# Patient Record
Sex: MALE | Race: BLACK OR AFRICAN AMERICAN | NOT HISPANIC OR LATINO | Employment: OTHER | URBAN - METROPOLITAN AREA
[De-identification: names, ages, dates, MRNs, and addresses within clinical notes are randomized per-mention and may not be internally consistent; named-entity substitution may affect disease eponyms.]

---

## 2017-01-10 ENCOUNTER — ALLSCRIPTS OFFICE VISIT (OUTPATIENT)
Dept: OTHER | Facility: OTHER | Age: 52
End: 2017-01-10

## 2017-01-24 ENCOUNTER — GENERIC CONVERSION - ENCOUNTER (OUTPATIENT)
Dept: OTHER | Facility: OTHER | Age: 52
End: 2017-01-24

## 2017-03-03 ENCOUNTER — GENERIC CONVERSION - ENCOUNTER (OUTPATIENT)
Dept: OTHER | Facility: OTHER | Age: 52
End: 2017-03-03

## 2017-06-15 ENCOUNTER — GENERIC CONVERSION - ENCOUNTER (OUTPATIENT)
Dept: OTHER | Facility: OTHER | Age: 52
End: 2017-06-15

## 2017-08-18 ENCOUNTER — APPOINTMENT (EMERGENCY)
Dept: CT IMAGING | Facility: HOSPITAL | Age: 52
End: 2017-08-18
Payer: MEDICARE

## 2017-08-18 ENCOUNTER — HOSPITAL ENCOUNTER (EMERGENCY)
Facility: HOSPITAL | Age: 52
Discharge: HOME | End: 2017-08-18
Attending: EMERGENCY MEDICINE | Admitting: EMERGENCY MEDICINE
Payer: MEDICARE

## 2017-08-18 VITALS
SYSTOLIC BLOOD PRESSURE: 174 MMHG | TEMPERATURE: 97.7 F | DIASTOLIC BLOOD PRESSURE: 83 MMHG | RESPIRATION RATE: 20 BRPM | HEART RATE: 78 BPM | OXYGEN SATURATION: 95 % | WEIGHT: 315 LBS

## 2017-08-18 DIAGNOSIS — R51.9 OCCIPITAL HEADACHE: Primary | ICD-10-CM

## 2017-08-18 PROCEDURE — 70450 CT HEAD/BRAIN W/O DYE: CPT

## 2017-08-18 PROCEDURE — 96361 HYDRATE IV INFUSION ADD-ON: CPT

## 2017-08-18 PROCEDURE — 99284 EMERGENCY DEPT VISIT MOD MDM: CPT

## 2017-08-18 PROCEDURE — 96375 TX/PRO/DX INJ NEW DRUG ADDON: CPT

## 2017-08-18 PROCEDURE — 96374 THER/PROPH/DIAG INJ IV PUSH: CPT

## 2017-08-18 RX ORDER — INSULIN GLARGINE 100 [IU]/ML
50 INJECTION, SOLUTION SUBCUTANEOUS
COMMUNITY
End: 2018-05-14 | Stop reason: ALTCHOICE

## 2017-08-18 RX ORDER — METOCLOPRAMIDE HYDROCHLORIDE 5 MG/ML
10 INJECTION INTRAMUSCULAR; INTRAVENOUS ONCE
Status: COMPLETED | OUTPATIENT
Start: 2017-08-18 | End: 2017-08-18

## 2017-08-18 RX ORDER — KETOROLAC TROMETHAMINE 30 MG/ML
15 INJECTION, SOLUTION INTRAMUSCULAR; INTRAVENOUS ONCE
Status: COMPLETED | OUTPATIENT
Start: 2017-08-18 | End: 2017-08-18

## 2017-08-18 RX ORDER — OMEPRAZOLE 20 MG/1
20 CAPSULE, DELAYED RELEASE ORAL DAILY
COMMUNITY
End: 2018-06-18 | Stop reason: SDUPTHER

## 2017-08-18 RX ORDER — LISINOPRIL 20 MG/1
20 TABLET ORAL DAILY
COMMUNITY
End: 2018-02-19 | Stop reason: SDUPTHER

## 2017-08-18 RX ORDER — DIPHENHYDRAMINE HYDROCHLORIDE 50 MG/ML
25 INJECTION INTRAMUSCULAR; INTRAVENOUS ONCE
Status: COMPLETED | OUTPATIENT
Start: 2017-08-18 | End: 2017-08-18

## 2017-08-18 RX ADMIN — SODIUM CHLORIDE 1000 ML: 0.9 INJECTION, SOLUTION INTRAVENOUS at 12:01

## 2017-08-18 RX ADMIN — DIPHENHYDRAMINE HYDROCHLORIDE 25 MG: 50 INJECTION, SOLUTION INTRAMUSCULAR; INTRAVENOUS at 12:00

## 2017-08-18 RX ADMIN — KETOROLAC TROMETHAMINE 15 MG: 30 INJECTION, SOLUTION INTRAMUSCULAR at 11:57

## 2017-08-18 RX ADMIN — METOCLOPRAMIDE 10 MG: 5 INJECTION, SOLUTION INTRAMUSCULAR; INTRAVENOUS at 11:58

## 2017-08-22 ENCOUNTER — ALLSCRIPTS OFFICE VISIT (OUTPATIENT)
Dept: OTHER | Facility: OTHER | Age: 52
End: 2017-08-22

## 2017-08-22 LAB — HBA1C MFR BLD HPLC: 8.1 %

## 2017-08-30 ENCOUNTER — ALLSCRIPTS OFFICE VISIT (OUTPATIENT)
Dept: OTHER | Facility: OTHER | Age: 52
End: 2017-08-30

## 2017-09-05 ENCOUNTER — ALLSCRIPTS OFFICE VISIT (OUTPATIENT)
Dept: OTHER | Facility: OTHER | Age: 52
End: 2017-09-05

## 2017-09-05 DIAGNOSIS — E11.65 TYPE 2 DIABETES MELLITUS WITH HYPERGLYCEMIA (HCC): ICD-10-CM

## 2017-09-07 ENCOUNTER — APPOINTMENT (OUTPATIENT)
Dept: LAB | Facility: CLINIC | Age: 52
End: 2017-09-07
Payer: MEDICARE

## 2017-09-07 ENCOUNTER — ALLSCRIPTS OFFICE VISIT (OUTPATIENT)
Dept: OTHER | Facility: OTHER | Age: 52
End: 2017-09-07

## 2017-09-07 DIAGNOSIS — E11.65 TYPE 2 DIABETES MELLITUS WITH HYPERGLYCEMIA (HCC): ICD-10-CM

## 2017-09-07 DIAGNOSIS — R80.9 PROTEINURIA: ICD-10-CM

## 2017-09-07 LAB
ALBUMIN SERPL BCP-MCNC: 3 G/DL (ref 3.5–5)
ALP SERPL-CCNC: 96 U/L (ref 46–116)
ALT SERPL W P-5'-P-CCNC: 11 U/L (ref 12–78)
ANION GAP SERPL CALCULATED.3IONS-SCNC: 4 MMOL/L (ref 4–13)
AST SERPL W P-5'-P-CCNC: 12 U/L (ref 5–45)
BACTERIA UR QL AUTO: ABNORMAL /HPF
BILIRUB SERPL-MCNC: 0.34 MG/DL (ref 0.2–1)
BILIRUB UR QL STRIP: NEGATIVE
BUN SERPL-MCNC: 16 MG/DL (ref 5–25)
CALCIUM SERPL-MCNC: 9.5 MG/DL (ref 8.3–10.1)
CHLORIDE SERPL-SCNC: 98 MMOL/L (ref 100–108)
CHOLEST SERPL-MCNC: 137 MG/DL (ref 50–200)
CLARITY UR: CLEAR
CO2 SERPL-SCNC: 32 MMOL/L (ref 21–32)
COLOR UR: YELLOW
CREAT SERPL-MCNC: 1.3 MG/DL (ref 0.6–1.3)
CREAT UR-MCNC: 118 MG/DL
GFR SERPL CREATININE-BSD FRML MDRD: 73 ML/MIN/1.73SQ M
GLUCOSE P FAST SERPL-MCNC: 205 MG/DL (ref 65–99)
GLUCOSE UR STRIP-MCNC: ABNORMAL MG/DL
HDLC SERPL-MCNC: 33 MG/DL (ref 40–60)
HGB UR QL STRIP.AUTO: ABNORMAL
HYALINE CASTS #/AREA URNS LPF: ABNORMAL /LPF
KETONES UR STRIP-MCNC: NEGATIVE MG/DL
LDLC SERPL CALC-MCNC: 73 MG/DL (ref 0–100)
LEUKOCYTE ESTERASE UR QL STRIP: NEGATIVE
MICROALBUMIN UR-MCNC: 1270 MG/L (ref 0–20)
MICROALBUMIN/CREAT 24H UR: 1076 MG/G CREATININE (ref 0–30)
NITRITE UR QL STRIP: NEGATIVE
NON-SQ EPI CELLS URNS QL MICRO: ABNORMAL /HPF
PH UR STRIP.AUTO: 6 [PH] (ref 4.5–8)
POTASSIUM SERPL-SCNC: 4 MMOL/L (ref 3.5–5.3)
PROT SERPL-MCNC: 7.6 G/DL (ref 6.4–8.2)
PROT UR STRIP-MCNC: ABNORMAL MG/DL
RBC #/AREA URNS AUTO: ABNORMAL /HPF
SODIUM SERPL-SCNC: 134 MMOL/L (ref 136–145)
SP GR UR STRIP.AUTO: 1.02 (ref 1–1.03)
TRIGL SERPL-MCNC: 154 MG/DL
UROBILINOGEN UR QL STRIP.AUTO: 0.2 E.U./DL
WBC #/AREA URNS AUTO: ABNORMAL /HPF

## 2017-09-07 PROCEDURE — 82043 UR ALBUMIN QUANTITATIVE: CPT

## 2017-09-07 PROCEDURE — 80053 COMPREHEN METABOLIC PANEL: CPT

## 2017-09-07 PROCEDURE — 81001 URINALYSIS AUTO W/SCOPE: CPT

## 2017-09-07 PROCEDURE — 80061 LIPID PANEL: CPT

## 2017-09-07 PROCEDURE — 82570 ASSAY OF URINE CREATININE: CPT

## 2017-09-07 PROCEDURE — 36415 COLL VENOUS BLD VENIPUNCTURE: CPT

## 2017-09-26 ENCOUNTER — GENERIC CONVERSION - ENCOUNTER (OUTPATIENT)
Dept: OTHER | Facility: OTHER | Age: 52
End: 2017-09-26

## 2017-09-28 ENCOUNTER — GENERIC CONVERSION - ENCOUNTER (OUTPATIENT)
Dept: OTHER | Facility: OTHER | Age: 52
End: 2017-09-28

## 2017-09-28 ENCOUNTER — ALLSCRIPTS OFFICE VISIT (OUTPATIENT)
Dept: OTHER | Facility: OTHER | Age: 52
End: 2017-09-28

## 2017-10-19 ENCOUNTER — GENERIC CONVERSION - ENCOUNTER (OUTPATIENT)
Dept: OTHER | Facility: OTHER | Age: 52
End: 2017-10-19

## 2017-10-31 ENCOUNTER — ALLSCRIPTS OFFICE VISIT (OUTPATIENT)
Dept: OTHER | Facility: OTHER | Age: 52
End: 2017-10-31

## 2017-11-06 ENCOUNTER — ALLSCRIPTS OFFICE VISIT (OUTPATIENT)
Dept: OTHER | Facility: OTHER | Age: 52
End: 2017-11-06

## 2017-11-06 DIAGNOSIS — F31.60 BIPOLAR DISORDER, CURRENT EPISODE MIXED (HCC): ICD-10-CM

## 2017-11-07 ENCOUNTER — ALLSCRIPTS OFFICE VISIT (OUTPATIENT)
Dept: OTHER | Facility: OTHER | Age: 52
End: 2017-11-07

## 2017-11-07 ENCOUNTER — APPOINTMENT (OUTPATIENT)
Dept: LAB | Facility: CLINIC | Age: 52
End: 2017-11-07
Payer: MEDICARE

## 2017-11-07 DIAGNOSIS — F31.60 MIXED BIPOLAR I DISORDER (HCC): Primary | ICD-10-CM

## 2017-11-07 DIAGNOSIS — F31.60 BIPOLAR DISORDER, CURRENT EPISODE MIXED (HCC): ICD-10-CM

## 2017-11-07 LAB
ANION GAP SERPL CALCULATED.3IONS-SCNC: 3 MMOL/L (ref 4–13)
BASOPHILS # BLD AUTO: 0.04 THOUSANDS/ΜL (ref 0–0.1)
BASOPHILS NFR BLD AUTO: 1 % (ref 0–1)
BUN SERPL-MCNC: 15 MG/DL (ref 5–25)
CALCIUM SERPL-MCNC: 9 MG/DL (ref 8.3–10.1)
CHLORIDE SERPL-SCNC: 103 MMOL/L (ref 100–108)
CO2 SERPL-SCNC: 30 MMOL/L (ref 21–32)
CREAT SERPL-MCNC: 1.25 MG/DL (ref 0.6–1.3)
EOSINOPHIL # BLD AUTO: 0.18 THOUSAND/ΜL (ref 0–0.61)
EOSINOPHIL NFR BLD AUTO: 2 % (ref 0–6)
ERYTHROCYTE [DISTWIDTH] IN BLOOD BY AUTOMATED COUNT: 14.4 % (ref 11.6–15.1)
GFR SERPL CREATININE-BSD FRML MDRD: 77 ML/MIN/1.73SQ M
GLUCOSE P FAST SERPL-MCNC: 168 MG/DL (ref 65–99)
HCT VFR BLD AUTO: 39.1 % (ref 36.5–49.3)
HGB BLD-MCNC: 13.3 G/DL (ref 12–17)
LYMPHOCYTES # BLD AUTO: 1.94 THOUSANDS/ΜL (ref 0.6–4.47)
LYMPHOCYTES NFR BLD AUTO: 26 % (ref 14–44)
MCH RBC QN AUTO: 25.5 PG (ref 26.8–34.3)
MCHC RBC AUTO-ENTMCNC: 34 G/DL (ref 31.4–37.4)
MCV RBC AUTO: 75 FL (ref 82–98)
MONOCYTES # BLD AUTO: 0.64 THOUSAND/ΜL (ref 0.17–1.22)
MONOCYTES NFR BLD AUTO: 9 % (ref 4–12)
NEUTROPHILS # BLD AUTO: 4.69 THOUSANDS/ΜL (ref 1.85–7.62)
NEUTS SEG NFR BLD AUTO: 62 % (ref 43–75)
NRBC BLD AUTO-RTO: 0 /100 WBCS
PLATELET # BLD AUTO: 199 THOUSANDS/UL (ref 149–390)
PMV BLD AUTO: 10.3 FL (ref 8.9–12.7)
POTASSIUM SERPL-SCNC: 4.1 MMOL/L (ref 3.5–5.3)
RBC # BLD AUTO: 5.21 MILLION/UL (ref 3.88–5.62)
SODIUM SERPL-SCNC: 136 MMOL/L (ref 136–145)
TSH SERPL DL<=0.05 MIU/L-ACNC: 5.39 UIU/ML (ref 0.36–3.74)
WBC # BLD AUTO: 7.53 THOUSAND/UL (ref 4.31–10.16)

## 2017-11-07 PROCEDURE — 84443 ASSAY THYROID STIM HORMONE: CPT

## 2017-11-07 PROCEDURE — 80048 BASIC METABOLIC PNL TOTAL CA: CPT

## 2017-11-07 PROCEDURE — 36415 COLL VENOUS BLD VENIPUNCTURE: CPT

## 2017-11-07 PROCEDURE — 85025 COMPLETE CBC W/AUTO DIFF WBC: CPT

## 2017-11-07 PROCEDURE — 80307 DRUG TEST PRSMV CHEM ANLYZR: CPT

## 2017-11-07 PROCEDURE — 86592 SYPHILIS TEST NON-TREP QUAL: CPT

## 2017-11-08 LAB
AMPHETAMINES UR QL SCN: NEGATIVE NG/ML
BARBITURATES UR QL SCN: NEGATIVE NG/ML
BENZODIAZ UR QL SCN: NEGATIVE NG/ML
BZE UR QL SCN: NEGATIVE NG/ML
CANNABINOIDS UR QL SCN: NEGATIVE NG/ML
METHADONE UR QL SCN: NEGATIVE NG/ML
OPIATES UR QL: NEGATIVE NG/ML
PCP UR QL: NEGATIVE NG/ML
PROPOXYPH UR QL: NEGATIVE NG/ML
RPR SER QL: NORMAL

## 2017-11-09 ENCOUNTER — GENERIC CONVERSION - ENCOUNTER (OUTPATIENT)
Dept: OTHER | Facility: OTHER | Age: 52
End: 2017-11-09

## 2017-11-09 LAB — MISCELLANEOUS LAB TEST RESULT: NORMAL

## 2017-11-10 ENCOUNTER — GENERIC CONVERSION - ENCOUNTER (OUTPATIENT)
Dept: OTHER | Facility: OTHER | Age: 52
End: 2017-11-10

## 2017-11-22 ENCOUNTER — ALLSCRIPTS OFFICE VISIT (OUTPATIENT)
Dept: OTHER | Facility: OTHER | Age: 52
End: 2017-11-22

## 2017-11-24 ENCOUNTER — GENERIC CONVERSION - ENCOUNTER (OUTPATIENT)
Dept: OTHER | Facility: OTHER | Age: 52
End: 2017-11-24

## 2017-12-18 ENCOUNTER — APPOINTMENT (OUTPATIENT)
Dept: LAB | Facility: CLINIC | Age: 52
End: 2017-12-18
Payer: MEDICARE

## 2017-12-18 ENCOUNTER — ALLSCRIPTS OFFICE VISIT (OUTPATIENT)
Dept: OTHER | Facility: OTHER | Age: 52
End: 2017-12-18

## 2017-12-18 DIAGNOSIS — E11.65 TYPE 2 DIABETES MELLITUS WITH HYPERGLYCEMIA (HCC): ICD-10-CM

## 2017-12-18 DIAGNOSIS — R79.89 OTHER SPECIFIED ABNORMAL FINDINGS OF BLOOD CHEMISTRY: ICD-10-CM

## 2017-12-18 LAB
ANION GAP SERPL CALCULATED.3IONS-SCNC: 6 MMOL/L (ref 4–13)
BUN SERPL-MCNC: 14 MG/DL (ref 5–25)
CALCIUM SERPL-MCNC: 9.4 MG/DL (ref 8.3–10.1)
CHLORIDE SERPL-SCNC: 98 MMOL/L (ref 100–108)
CO2 SERPL-SCNC: 30 MMOL/L (ref 21–32)
CREAT SERPL-MCNC: 1.36 MG/DL (ref 0.6–1.3)
GFR SERPL CREATININE-BSD FRML MDRD: 69 ML/MIN/1.73SQ M
GLUCOSE P FAST SERPL-MCNC: 204 MG/DL (ref 65–99)
HBA1C MFR BLD HPLC: 9.4 %
POTASSIUM SERPL-SCNC: 3.9 MMOL/L (ref 3.5–5.3)
SODIUM SERPL-SCNC: 134 MMOL/L (ref 136–145)

## 2017-12-18 PROCEDURE — 36415 COLL VENOUS BLD VENIPUNCTURE: CPT

## 2017-12-18 PROCEDURE — 80048 BASIC METABOLIC PNL TOTAL CA: CPT

## 2017-12-19 ENCOUNTER — ALLSCRIPTS OFFICE VISIT (OUTPATIENT)
Dept: OTHER | Facility: OTHER | Age: 52
End: 2017-12-19

## 2017-12-21 ENCOUNTER — GENERIC CONVERSION - ENCOUNTER (OUTPATIENT)
Dept: INTERNAL MEDICINE CLINIC | Facility: CLINIC | Age: 52
End: 2017-12-21

## 2017-12-26 ENCOUNTER — APPOINTMENT (OUTPATIENT)
Dept: LAB | Facility: CLINIC | Age: 52
End: 2017-12-26
Payer: MEDICARE

## 2017-12-26 DIAGNOSIS — R79.89 OTHER SPECIFIED ABNORMAL FINDINGS OF BLOOD CHEMISTRY: ICD-10-CM

## 2017-12-26 LAB
BUN SERPL-MCNC: 13 MG/DL (ref 5–25)
CREAT SERPL-MCNC: 1.31 MG/DL (ref 0.6–1.3)
GFR SERPL CREATININE-BSD FRML MDRD: 72 ML/MIN/1.73SQ M

## 2017-12-26 PROCEDURE — 82565 ASSAY OF CREATININE: CPT

## 2017-12-26 PROCEDURE — 36415 COLL VENOUS BLD VENIPUNCTURE: CPT

## 2017-12-26 PROCEDURE — 84520 ASSAY OF UREA NITROGEN: CPT

## 2018-01-10 NOTE — RESULT NOTES
Verified Results  (1) DRUG ABUSE SCREEN, URINE ROUTINE [id number] 05:01PM [employer] [employer]     Test Name Result Flag Reference   AMPHETAMINE SCREEN URINE Negative ng/mL  Matauw=7818   Amphetamine test includes Amphetamine and Methamphetamine  BARBITURATE SCREEN URINE Negative ng/mL  Dolthx=169   BENZODIAZEPINE SCREEN, URINE Negative ng/mL  Qlhdai=511   CANNABINOID SCREEN URINE Negative ng/mL  Cutoff=50   COCAINE(METAB  )SCREEN, URINE Negative ng/mL  Khxmyu=703   METHADONE SCREEN, URINE Negative ng/mL  Tjhfjg=374   OPIATE SCREEN URINE Negative ng/mL  Jbvfxg=429   Opiate test includes Codeine and Morphine only  PHENCYCLIDINE (PCP), QUAL, UR Negative ng/mL  Cutoff=25   PROPOXYPHENE, SCREEN Negative ng/mL  Gxynpa=294   Performed at:  [facility] [address]  [id number]  : Onel Mittal MD, Phone:  [phone]     (1) [place] [id number] 09:37AM Alissa Skinner Order Number: [id number]     Test Name Result Flag Reference   RPR Non-Reactive  Non-Reactive     (1) TSH [id number] 09:37AM [facility] [place]     Test Name Result Flag Reference   TSH 5 390 uIU/mL H 0 358-3 740   This is a patient instruction: This test is non-fasting  Please drink two glasses of water morning of bloodwork  Patients undergoing fluorescein dye angiography may retain small amounts of fluorescein in the body for 48-72 hours post procedure  Samples containing fluorescein can produce falsely depressed TSH values  If the patient had this procedure,a specimen should be resubmitted post fluorescein clearance  Plan  I left msg on Pt's cell/home # of record for him to call me to discuss a labwork abnormality  I would like him to discuss with his PMD as well       Signatures   Electronically signed by : MARILYN Lucero; Nov 9 2017  6:51PM EST                       (Author)

## 2018-01-11 NOTE — PSYCH
History of Present Illness    Pre-morbid level of function and History of Present Illness: [place] My medication is too strong  I need to ease my anger thru therapy  Reason for evaluation and partial hospitalization as an alternative to inpatient hospitalization: N/A   will see patient as an outpatient  Previous Psychiatric/psychological treatment/year: Dr Jori Lincoln, saw a therapist at [facility]  Current Psychiatrist/Therapist: Anel and the undersigned  Outpatient and/or Partial and Other [employer] Used (CTT, ICM, VNA): Inpatient: [facility] 2 or 3 years ago thinking of hurting himself  , Outpatient: prior attempts and Partial: n/a  Problem Assessment:   SOCIAL/VOCATION:   Family Constellation (include parents, relationship with each and pertinent Psych/Medical History):   Spouse: still  but she is living in [address]: n/a   The patient relates best to significant other he lives with  He lives with with Edison Clark  He does not live alone  Domestic Violence: No past history of domestic violence  The patient is not currently experiencing domestic violence  There is not suspected domestic violence  There is no history of child abuse  There is no history of sexual abuse  Additional Comments related to family/relationships/peer support: feels family is supportive  School or Work History (strengths/limitations/needs: strengths- caring, loving and generous  His highest grade level achieved was  [employer]   history includes n/a  Financial status includes struggling  LEISURE ASSESSMENT (Include past and present hobbies/interests and level of involvement (Ex: Group/Club Affiliations): aniyah RIZO  His primary language is  [place]  Preferred language is english  Religions affiliations and level of involvement - [employer]ology   Spirituality and noris have helped him cope with difficult situations in his life        FUNCTIONAL STATUS: There has been a recent change in the patient's ability to do the following:  He does not need [employer]  Level of Assistance Needed/By Whom?: n/a  The patient learns best by  hands on  SUBSTANCE ABUSE ASSESSMENT: no current substance abuse and no past substance abuse  No previous detox/rehab treatment  HEALTH ASSESSMENT: He has not lost 10 lbs or more in the last 6 months without trying  He has not had decreased appetite for 5 days or more  He has not gained 10 lbs or more in the last 6 months without trying  no nausea  no vomiting  diarrhea  no referral to PCP needed  no referral to nutritionist needed  not referred to PCP  Current PCP: Dr Dick Spivey  PCP not notified  LEGAL: No Mental Health Advance Directive or Power of  on file  He does not want an information packet about Mental Health Advance Directives  The following ratings are based on my interview(s) with with the patient  Risk of Harm to Self:   Demographic risk factors include Nondenominational and male  Historical Risk Factors include: a relative or close friend who  by suicide and chronic psychiatric problems  Recent Specific Risk Factors include: experienced fleeting ideation, presence of hallucinations, sense of hopelessness/helplessness, feelings of guilt or self blame, recent losses: lost cousin last week  , chronic pain or health problems and diagnosis of depression  sometimes will see images of people from his past HTN, diabetes, depression, anxiety   These risk factors presented within the last fleeting suicidal ideation  Risk of Harm to Others:   Demographic Risk Factors include: male and does not want to hurt anyone  girlfriends father is not a nice person  Recent Specific Risk Factors include: concomitant mood or thought disorder and multiple stressors  Based on the above information, the client presents the following risk of harm to self or others: low  The following interventions are recommended: consultation with Marino Campos as needed  Notes regarding this Risk Assessment: fleeting suicidal ideation but no plan or intent  Review of Systems  anxiety, depression, emotional lability, disturbing or unusual thoughts, feelings, or sensations, emotional problems/concerns, sleep disturbances and decreased functioning ability, but no euphoria, no hostility, not suidical, no compulsive behavior, no impulsive behavior, no unusual behavior, no violent behavior, no unreasonable or irrational fears, no magical thinking, not having fantasies, no interpersonal relationship problems, no personality change and no character deficiency  Constitutional: No fever or chills, feels well, no tiredness, no recent weight gain or weight loss  Eyes: glaucoma  ENT: no complaints of earache, no hearing loss, no nosebleeds, no nasal discharge, no sore throat, no hoarseness  Cardiovascular: No complaints of slow heart rate, no fast heart rate, no chest pain, no palpitations, no leg claudication, no lower extremity  Respiratory: No complaints of shortness of breath, no wheezing, no cough, no SOB on exertion, no orthopnea or PND  Gastrointestinal: No complaints of abdominal pain, no constipation, no nausea or vomiting, no diarrhea or bloody stools  Genitourinary: No complaints of dysuria, no incontinence, no hesitancy, no nocturia, no genital lesion, no testicular pain  Musculoskeletal: No complaints of arthralgia, no myalgias, no joint swelling or stiffness, no limb pain or swelling  Integumentary: No complaints of skin rash or skin lesions, no itching, no skin wound, no dry skin  Neurological: No compliants of headache, no confusion, no convulsions, no numbness or tingling, no dizziness or fainting, no limb weakness, no difficulty walking  Psychiatric: anxiety, sleep disturbances, depression and emotional problems, but not suicidal and no personality change     Endocrine: diabetic, but No complaints of proptosis, no hot flashes, no muscle weakness, no erectile dysfunction, no deepening of the voice, no feelings of weakness  Hematologic/Lymphatic: No complaints of swollen glands, no swollen glands in the neck, does not bleed easily, no easy bruising  Active Problems    1  Acid reflux (530 81) (K21 9)   2  Acute bronchitis (466 0) (J20 9)   3  Allergic rhinitis (477 9) (J30 9)   4  Benign essential hypertension (401 1) (I10)   5  Bipolar affective disorder, current episode mixed, current episode severity unspecified   (296 60) (F31 60)   6  Borderline hyperlipidemia (272 4) (E78 5)   7  Diabetic polyneuropathy (250 60,357 2) (E11 42)   8  Equinus deformity of foot (736 79) (M21 6X9)   9  Erectile dysfunction (607 84) (N52 9)   10  Gasping for breath (786 09) (R06 89)   11  Microalbuminuria (791 0) (R80 9)   12  Morbid obesity with BMI of 50 0-59 9, adult (278 01,V85 43) (E66 01,Z68 43)   13  Need for pneumococcal vaccination (V03 82) (Z23)   14  Need for prophylactic vaccination and inoculation against influenza (V04 81) (Z23)   15  Obesity (278 00) (E66 9)   16  Other anxiety states (300 09) (F41 1)   17  Panic attacks (300 01) (F41 0)   18  Pes planus of both feet (734) (M21 41,M21 42)   19  Screening for colon cancer (V76 51) (Z12 11)   20  Snoring (786 09) (R06 83)   21  Type 2 diabetes mellitus with hyperglycemia (250 00) (E11 65)   22  Xerosis of skin (706 8) (L85 3)    Past Medical History    1  History of Brittle nails (703 8) (L60 3)   2  History of Chest congestion (786 9) (R09 89)   3  History of Chewing difficulty (783 3) (R63 3)   4  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) (F06 31)   5  History of Dyspepsia (536 8) (K30)   6  History of athlete's foot (V12 09) (Z86 19)   7  History of peripheral neuropathy (V12 49) (Z86 69)   8  History of type 2 diabetes mellitus (V12 29) (Z86 39)   9  History of upper respiratory infection (V12 09) (Z87 09)   10   History of Upper respiratory infection with cough and congestion (465 9) (J06 9)    The active problems and past medical history were reviewed and updated today  Past Psychiatric History    Past Psychiatric History: i inpatient stay 2 to 3 years ago  Surgical History    The surgical history was reviewed and updated today  Family Psych History  Mother    1  Family history of diabetes mellitus (V18 0) (Z83 3)   2  Family history of hypertension (V17 49) (Z82 49)  Father    3  Family history of alcoholism (V17 0) (Z81 1)   4  Family history of diabetes mellitus (V18 0) (Z83 3)   5  Family history of hypertension (V17 49) (Z82 49)  Sister    10  Family history of alcoholism (V17 0) (Z81 1)   7  Family history of depression (V17 0) (Z81 8)  Family History    8  Family history of alcoholism (V17 0) (Z81 1)    The family history was reviewed and updated today  Substance Abuse Hx    Substance Abuse History: no addiction with patient  Social History    · Chews tobacco (305 1) (Z72 0)   · Current Smokeless Tobacco User   · Has no children   · Never a smoker   · History of Never A Smoker   · On disability   ·  from spouse (V61 03) (Z63 5)   · Tobacco use  The social history was reviewed and updated today  The social history was reviewed and is unchanged  Current Meds   1  AmLODIPine Besylate 5 MG Oral Tablet; TAKE 1 TABLET DAILY; Therapy: 17NBN9454 to (Evaluate:98Nwk9528)  Requested for: 95Bfk5789; Last   Rx:45Nkw3156 Ordered   2  Aspirin 81 MG Oral Tablet Delayed Release; TAKE 1 TABLET DAILY; Therapy: 80Qvh7644 to (Sandra Pineda)  Requested for: 70Llp9511; Last   EU:36PHB0171 Ordered   3  Atorvastatin Calcium 10 MG Oral Tablet; take 1 tablet daily at bedtime; Therapy: 39HAX2004 to (Sandra Pineda)  Requested for: 19Iib0231; Last   OF:01PQX5671 Ordered   4  BD Pen Needle Short U/F 31G X 8 MM Miscellaneous; use to inject Lantus twice daily;    Therapy: 14VZG7790 to (Evaluate:71Mek7769) Requested for: 22Cnl5709; Last   Rx:71Ada5641 Ordered   5  Benzonatate 100 MG Oral Capsule (Tessalon Perles); TAKE 1 CAPSULE 3 TIMES DAILY   AS NEEDED; Therapy: 79Xvl3790 to (Evaluate:13Sjr9553)  Requested for: 46Ojk3271; Last   Rx:86Xcs3730 Ordered   6  ClonazePAM 1 MG Oral Tablet; Take 1 tablet by mouth daily as needed for anxiety or panic   attack; Therapy: 55JTR8511 to (Evaluate:67Fqo3502); Last Rx:06Nov2017 Ordered   7  Dulcolax 5 MG Oral Tablet Delayed Release; take 4 tablets together  as per GI pt   handout/instructions; Therapy: 62Peu1016 to (Last Rx:18Sep2017)  Requested for: 76Qbv1155 Ordered   8  FreeStyle Lancets Miscellaneous; may check sugar up to 3 times daily; Therapy: 35MPG5254 to (Evaluate:66Nlj2629)  Requested for: 45FWB4041; Last   Rx:25Jan2017 Ordered   9  FreeStyle Lite Device; make check blood sugars uo to 3 times daily; Therapy: 59Wxe5944 to (Evaluate:25Jan2018)  Requested for: 25Jan2017; Last   Rx:25Jan2017 Ordered   10  FreeStyle Lite Test In Vitro Strip; may test blood sugar up to 3 times daily; Therapy: 77BMD9916 to (Evaluate:37Bgs7558)  Requested for: 02XFM9439; Last    Rx:25Jan2017 Ordered   11  Ginseng 250 MG Oral Capsule; Take 1 tab (200mg ginseng) three times daily; Therapy: 09KZW2421 to Recorded   12  HumaLOG KwikPen 100 UNIT/ML Subcutaneous Solution Pen-injector; 15 units three    time daily with meals; Therapy: 63LCA2443 to (Liz Hutton)  Requested for: 07Sep2017; Last    Rx:07Sep2017 Ordered   13  Insulin Syringe 29G X 1/2" 0 5 ML Miscellaneous; for use with humalog 40 units twice a    day with meals; Therapy: 81JLA4110 to (Evaluate:19Jun2017)  Requested for: 56Twc2945; Last    Rx:17Kui0370 Ordered   14  Lantus SoloStar 100 UNIT/ML Subcutaneous Solution Pen-injector; INJECT 60 UNIT    Bedtime; Therapy: 21SJO3681 to (Evaluate:78Nod2952)  Requested for: 67PHV2625; Last    Rx:76Hzw9619 Ordered   15   Lisinopril-Hydrochlorothiazide 20-25 MG Oral Tablet; TAKE ONE TABLET BY MOUTH    ONCE DAILY; Therapy: 32DPQ5651 to (Evaluate:27Dty0110)  Requested for: 19Sge2454; Last    Rx:16Mtu3028 Ordered   16  Omeprazole 20 MG Oral Tablet Delayed Release; Take one tablet daily; Therapy: 00DVO2528 to (Evaluate:84Eye7938)  Requested for: 79TDE7904; Last    Rx:07Nov2017 Ordered   17  Polyethylene Glycol 3350 Oral Powder; mix Miralax 238gm bottle into 64oz Gatorade -    drink the night before the colonoscopy and 1 hour after Dulcolax taken; Therapy: 65Nvv2531 to (Last Rx:40Odj0102)  Requested for: 56One3962 Ordered   18  RaNITidine HCl - 150 MG Oral Tablet; TAKE 1 TABLET EVERY 12 HOURS AS NEEDED; Therapy: 78IUJ3249 to (Evaluate:78Rke0084)  Requested for: 96Vqw4602; Last    Rx:01Otg1866 Ordered   19  Sharps Container Miscellaneous; for use with insulin needles; Therapy: 55NQQ5810 to (Last Rx:78Yew1925) Ordered   20  Sildenafil Citrate 20 MG Oral Tablet; Take 2 to 3 tablets by mouth 30 minutes prior to    sexual activity; Therapy: 03LYC0922 to ()  Requested for: 57JCE7623; Last    Rx:34Wzg0858 Ordered   21  Ventolin  (90 Base) MCG/ACT Inhalation Aerosol Solution; inhale 1 to 2 puffs    every 4 to 6 hours if needed; Therapy: 14Jdi1982 to (Last Tommy Pipe)  Requested for: 57Ebg1923 Ordered   22  Ziprasidone HCl - 40 MG Oral Capsule; TAKE 1 CAPSULE BY MOUTH DAILY WITH    DINNER; Therapy: 01DIU7051 to (Evaluate:25Izq6157)  Requested for: 75PDL6116; Last    Rx:06Nov2017 Ordered    Allergies    1  TETANUS    Physical Exam    Appearance: was calm and cooperative, adequate hygiene and grooming and good eye contact  Observed mood: mood appropriate  Observed mood: affect appropriate  Speech: a normal rate  Thought processes: coherent/organized  Hallucinations: visual hallucinations   Claims he sees at times people from his past    Thought Content: no delusions  Abnormal Thoughts: The patient has passive/fleeting thoughts of suicide  Orientation: The patient is oriented to person, place and time, oriented to person, oriented to place and oriented to time  Recent and Remote Memory: short term memory intact and long term memory intact  Attention Span And Concentration: concentration intact  Insight: Insight intact  Judgment: His judgment was intact  Muscle Strength And Tone  Muscle strength and tone were normal  gait affected by weight  Language: no difficulty naming common objects, no difficulty repeating a phrase and no difficulty writing a sentence  Fund of knowledge: Patient displays adequate knowledge of current events, adequate fund of knowledge regarding past history and adequate fund of knowledge regarding vocabulary  On a scale of 0 - 10 the pain severity is a 5  Back and right knee  DSM    Provisional Diagnosis: bipolar  panic  Assessment    1  Bipolar affective disorder, current episode mixed, current episode severity unspecified   (296 60) (F31 60)   2  Panic attacks (300 01) (F41 0)   3   Family history of alcoholism (V17 0) (Z81 1) : Sister, Family History, Father    Future Appointments    Date/Time Provider Specialty Site   12/19/2017 04:00 PM MARILYN Oneill Psychiatry [facility]   12/08/2017 08:00 AM Latrell Richter MD Gastroenterology Adult [address]   Electronically signed by : Kelsy Tellez ACSWLCSW; Nov 22 2017  2:57PM EST                       (Author)    Electronically signed by : DARA Galindo ; Nov 22 2017  3:11PM EST                       (Author)

## 2018-01-12 VITALS
DIASTOLIC BLOOD PRESSURE: 90 MMHG | WEIGHT: 315 LBS | HEIGHT: 66 IN | TEMPERATURE: 98 F | SYSTOLIC BLOOD PRESSURE: 110 MMHG | HEART RATE: 108 BPM | BODY MASS INDEX: 50.62 KG/M2

## 2018-01-12 VITALS — HEART RATE: 84 BPM | WEIGHT: 315 LBS | BODY MASS INDEX: 50.62 KG/M2 | TEMPERATURE: 98.7 F | HEIGHT: 66 IN

## 2018-01-12 NOTE — PSYCH
Behavioral Health Outpatient Intake    Referred By: Kavya Davenport, [address]   Intake Questions: there are no developmental disabilities  the patient does not have a hearing impairment  the patient does not have an ICM or CTT  patient is not taking injectable psychiatric medications  Employment: The patient is not employed  at disabled  Emergency Contact Information:   Emergency Contact: Randee Bañuelos   Relationship to Patient: [employer]LE[employer]   Phone Number: [phone]   Previous Psychiatric Treatment: He has previously been seen by a psychiatrist  Loretta Hansen, 6 MONTHS AGO  He has previously been seen by a therapist  Hernán Quintanilla   History: no  service  He has not had combat service  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 as a member of the [employer] or [employer]  Insurance Subscriber: [facility]   Primary Insurance: MEDICARE   ID number: [id number]   Secondary Insurance: [id number] [facility]   Phone number: [phone]   ID number: [id number]         Presenting Problem (in patient's words): DEPRESSION AND ANXIETY, ADHD, MED MANAGEMENT  Substance Abuse: NONE  Previous Treatment: The patient has not been seen here in the past      Accepted as Patient   Heather Celaya 11/6/17 @ 10:00 & ANTHONY DIAS 11/22/17 @ 2:00 [facility]#[id number]     Primary Care Physician: DR Diana Henriquez AT [place] [id number]   Electronically signed by : Yulia Harley, ; Sep 28 2017  1:20PM EST                       (Author)

## 2018-01-13 NOTE — MISCELLANEOUS
Message  Spoke with patient over the phone re: diabetes and Medicate D  He has not been getting his own medicine because he was not approved for his Medicare D plan  He needs to identify a new one  Has been using his wife's insulin to this point, but has not been checking glucoses  Reports that his polyuria is much better with insulin use  Talked with him that: a ) he can meet with me tomorrow and we can identify a new Medicare Part D plan for him  b ) We can get him a new glucometer through his Medicare B plan  Once he has glucometer and Medicare D, will follow his glucose control and adjust insulin  Plan to follow-up in office in 4 weeks after tomorrow's meeting        Signatures   Electronically signed by : Jolynn Cuevas; Jan 24 2017  2:31PM EST                       (Author)

## 2018-01-13 NOTE — PSYCH
Treatment Plan Tracking    #1 Treatment Plan not completed within required time limits due to: Other: There was no time during the initial assessment to complete the recovery plan            Signatures   Electronically signed by : AICHA DouglassMadelia Community HospitalJAMAAL; Nov 22 2017  3:07PM EST                       (Author)

## 2018-01-15 NOTE — PROGRESS NOTES
Assessment    1  Diabetic polyneuropathy (250 60,357 2) (E11 42)   2  Xerosis of skin (706 8) (L85 3)    Plan    · Ammonium Lactate 12 % External Cream; APPLY  AND RUB  IN A THIN FILM TO  AFFECTED AREAS TWICE DAILY  (AM AND PM)   · Follow-up visit in 4 Months Evaluation and Treatment  Follow-up  Status: Hold For -  Scheduling  Requested for: 26YAV6868    Discussion/Summary    54 yo male presents with DM for Foot risk eval  - Discussed with patient that we will not be adjusting his gabapentin dosage at this time as the worsening symptoms are likely due to his uncontrolled blood sugars  We will wait for the patient to get his blood sugars under control and if he is still experiencing increased pain in his feet we will then consider adjusting his gabapentin  - Discussed the importance of glycemic control and that his elevated blood sugars are likely the cause of his increased pain  Also educated patient on importance of daily foot checks, proper diet, and proper shoe gear  - Brittle Nails reduced x10 without incidence  - diminished pulses and sensation  - RTC 4 months  Chief Complaint  54 yo male for Diabetic Foot Risk Eval      History of Present Illness  HPI: Patient is a 53year old male, with has been a type II diabetic for more than 20 years  He is presenting for a diabetic foot evaluation  His chief complaint is increasing intensity of his night cramps  He admits that he has not been watching his diet over the past year and that his blood sugars have been increasing  His last a1c was around 10  He is insulin dependent  He states that he does check his feet daily, and that he has not been applying lotion to his feet  He had to have his girlfriend trim his own nails a few weeks ago as they had become too painfully elongated  He wears sneakers today  Hospital Based Practices Required Assessment:   Pain Assessment   the patient states they do not have pain   (on a scale of 0 to 10, the patient rates the pain at 0 )   Abuse And Domestic Violence Screen    Yes, the patient is safe at home  The patient states no one is hurting them  Depression And Suicide Screen  No, the patient has not had thoughts of hurting themself  Yes, the patient has felt depressed in the past 7 days  Prefered Language is  [place]  Primary Language is  English  Review of Systems    Constitutional: no fever and no chills  Cardiovascular: no chest pain and no palpitations  Respiratory: no shortness of breath and no cough  Gastrointestinal: no abdominal pain  Musculoskeletal: limb swelling, but no limb pain  Integumentary: dry skin  Neurological: numbness and tingling  ROS reviewed  Active Problems    1  Allergic rhinitis (477 9) (J30 9)   2  Benign essential hypertension (401 1) (I10)   3  Bipolar disease, chronic (296 80) (F31 9)   4  Brittle nails (703 8) (L60 3)   5  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)   (F06 31)   6  Diabetic polyneuropathy (250 60,357 2) (E11 42)   7  Need for prophylactic vaccination and inoculation against influenza (V04 81) (Z23)   8  Obesity (278 00) (E66 9)   9  Tinea pedis (110 4) (B35 3)   10  Type 2 diabetes mellitus (250 00) (E11 9)   11  Type 2 diabetes mellitus with hyperglycemia (250 00) (E11 65)   12  Xerosis of skin (706 8) (L85 3)    Past Medical History    · History of Chest congestion (786 9) (R09 89)   · History of Chewing Difficulty (783 3) (R63 3)   · History of Dyspepsia (536 8) (K30)   · History of Erectile dysfunction (607 84) (N52 9)   · History of peripheral neuropathy (V12 49) (Z86 69)   · History of upper respiratory infection (V12 09) (Z87 09)    The active problems and past medical history were reviewed and updated today  Surgical History    The surgical history was reviewed and updated today  Family History    The family history was reviewed and updated today         Social History    · Chews tobacco (305  1) (Z72 0)   · Current Smokeless Tobacco User   · Never a smoker   · History of Never A Smoker   · Tobacco use  The social history was reviewed and updated today  The social history was reviewed and is unchanged  Current Meds   1  Aspirin 81 MG Oral Tablet; TAKE 1 TABLET DAILY; Therapy: 02ZOJ2343 to (Evaluate:01Feb2016); Last Rx:46Jom3935 Ordered   2  Atorvastatin Calcium 10 MG Oral Tablet; take 1 tablet daily at bedtime; Therapy: 50YUQ4710 to (Evaluate:05Jan2017)  Requested for: 22EEK7137; Last   Rx:11Jan2016 Ordered   3  BD Pen Needle Short U/F 31G X 8 MM Miscellaneous; use to inject Lantus twice daily; Therapy: 82EEY9991 to (Evaluate:11Sep2016)  Requested for: 48JEQ4667; Last   Rx:64Teg2660 Ordered   4  ClonazePAM 1 MG Oral Tablet; TAKE 1 TABLET AT NIGHT AS NEEDED; Therapy: 55ACP9645 to (Evaluate:04Sep2015) Recorded   5  DULoxetine HCl - 30 MG Oral Capsule Delayed Release Particles; TAKE 1 CAPSULE   DAILY at night; Therapy: 29Jqy7122 to Recorded   6  DULoxetine HCl - 60 MG Oral Capsule Delayed Release Particles; take 1 capsule daily   in the morning; Therapy: 91Cnd0475 to Recorded   7  FreeStyle Lancets Miscellaneous; may check sugar up to 3 times daily; Therapy: 76UOC2522 to (Alex Griffith)  Requested for: 03OMM5224; Last   DQ:43UZB2755 Ordered   8  FreeStyle Lite Device; USE AS DIRECTED; Therapy: 86NEI1397 to (Ronna Flores)  Requested for: 13CPF7979; Last   Rx:08Mar2013 Ordered   9  FreeStyle Lite Test In Vitro Strip; may test blood sugar up to 3 times daily; Therapy: 28VYZ6908 to (Alex Griffith)  Requested for: 36RZQ9301; Last   IW:26VRO4644 Ordered   10  Gabapentin 300 MG Oral Capsule; take 1 capsule by mouth twice a day; Therapy: 80FZD4816 to (Evaluate:19Jan2016)  Requested for: 65Sbc7609; Last    Rx:83Tnb1076 Ordered   11   HumaLOG Mix 75/25 (75-25) 100 UNIT/ML Subcutaneous Suspension; 40 units every    morning with breakfast;    Therapy: 07Ior9276 to (Evaluate:19Mar2016)  Requested for: 40RLB2726; Last    Rx:45Qld6228 Ordered   12  Insulin Syringe 29G X 1/2" 0 5 ML Miscellaneous; for use with humalog 20 units twice a    day with meals; Therapy: 57WRG2775 to (Evaluate:18Oct2015)  Requested for: 53Nnu4121; Last    Rx:16Var8593 Ordered   13  Lantus SoloStar 100 UNIT/ML Subcutaneous Solution Pen-injector; INJECT 50 UNIT in    Morning and 50 UNIT at Bedtime; Therapy: 68JUM1869 to (Evaluate:26Jkl6847)  Requested for: 63PZU4750; Last    Rx:03Asu0910 Ordered   14  Latuda 80 MG Oral Tablet; TAKE 1 TAB HS;    Therapy: 99RGX9478 to (Evaluate:58Ktb7035) Recorded   15  Lisinopril-Hydrochlorothiazide 20-25 MG Oral Tablet; TAKE ONE TABLET BY MOUTH    ONCE DAILY; Therapy: 92DSY3780 to (Evaluate:19Mar2016)  Requested for: 59Dln6088; Last    Rx:39Sal5095 Ordered   16  MetFORMIN HCl - 1000 MG Oral Tablet; TAKE 1 TABLET DAILY AS DIRECTED; Therapy: 19VII2623 to (Evaluate:36Jmf9149)  Requested for: 64Lkt7596; Last    Rx:95Bnd0821 Ordered   17  Omeprazole 20 MG Oral Capsule Delayed Release; take 1 capsule by mouth once daily; Therapy: 56Bnm5292 to (Evaluate:15Mar2016)  Requested for: 82TID6452; Last    Rx:77Qyo1952 Ordered   18  Sharps Container Miscellaneous; for use with insulin needles; Therapy: 05SAN1454 to (Last Rx:11Nao0861) Ordered    The medication list was reviewed and updated today  Allergies    1  TETANUS    Vitals   Recorded: 40HUI4744 10:52AM   Height 5 ft 5 75 in   Weight 321 lb 13 90 oz   BMI Calculated 52 35   BSA Calculated 2 44     Physical Exam    Cardiovascular: abnormal posterior tibialis pulse and edema, but normal dorsalis pedis pulse  Orthopedic/Biomechanical: pes planovalgus foot type bl  Toenails: All of the toenails were elongated, hypertrophied, discolored and tender  Skin: no open lesions, xerosis noted diffusely bilaterally  Procedure      Procedure: toenail debridement performed     Indications for the procedure include professional performance of nail care required due to physical limitations  The procedure's risks and benefits were discussed with the patient  Procedure Note: The toenails were debrided using heavy-duty nail nippers  Post-Procedure: the patient tolerated the procedure well  Complications: there were no complications  Attending Note  Attending Note [facility]: Attending Note: I agree with the Resident management plan as it was presented to me  Level of Participation: I was present in clinic, but did not examine the patient  I agree with the Resident's note  Future Appointments    Date/Time Provider Specialty Site   02/10/2016 10:00 AM Henrique Yates  Pharmacist [address] PCP   05/03/2016 10:10 AM Specialty Clinic, Podiatry  [address]     Signatures   Electronically signed by :  Beti Young DPM; Jan 26 2016 11:42AM EST                       (Author)    Electronically signed by : Kam Jo DPM; Jan 26 2016 11:43AM EST                       (Co-author)

## 2018-01-16 NOTE — PROGRESS NOTES
History of Present Illness  HPI: Pt seen buy [facility]/JAMAAL this date to assist with info on increasing his medial coverage  Pt has received notification that he has been approved for EXTRA HELP and his co-pay for Mediation are $3 30 for generic and $ 8 25 for all other drugs  Pt is very pleased re same and reports he will now be able to get all of his medications filled  Pt is considering switching his coverage to a [employer]  Pt has confirmed that the [facility] and [facility] in sight would be in network so long as it is NOT the Community ( [employer]  Pt is also exploring this with his [address] provider  S has also discussed with pt his diabetes compliance  Pt is taking all prescribed medication but he does not follow a set meal plan  DSMT classes were again offered but pt relates that he can not attend at this time due to his many appointments  JAMAAL has also invited pt to our monthly Support Group meeting an or ongoing exercise classes and info provided on same  Pt relates he is exploring possible gym memberships with his new insurance as well  Diabetic Team to remain available to assist as indicted  Active Problems    1  Acid reflux (530 81) (K21 9)   2  Acute bronchitis (466 0) (J20 9)   3  Allergic rhinitis (477 9) (J30 9)   4  Benign essential hypertension (401 1) (I10)   5  Bipolar affective disorder, current episode mixed, current episode severity unspecified   (296 60) (F31 60)   6  Borderline hyperlipidemia (272 4) (E78 5)   7  Diabetic polyneuropathy (250 60,357 2) (E11 42)   8  Equinus deformity of foot (736 79) (M21 6X9)   9  Erectile dysfunction (607 84) (N52 9)   10  Gasping for breath (786 09) (R06 89)   11  Microalbuminuria (791 0) (R80 9)   12  Morbid obesity with BMI of 50 0-59 9, adult (278 01,V85 43) (E66 01,Z68 43)   13  Need for pneumococcal vaccination (V03 82) (Z23)   14   Need for prophylactic vaccination and inoculation against influenza (V04 81) (Z23)   15  Obesity (278 00) (E66 9)   16  Other anxiety states (300 09) (F41 1)   17  Panic attacks (300 01) (F41 0)   18  Pes planus of both feet (734) (M21 41,M21 42)   19  Screening for colon cancer (V76 51) (Z12 11)   20  Snoring (786 09) (R06 83)   21  Type 2 diabetes mellitus with hyperglycemia (250 00) (E11 65)   22  Xerosis of skin (706 8) (L85 3)    Current Meds   1  AmLODIPine Besylate 5 MG Oral Tablet; TAKE 1 TABLET DAILY; Therapy: 20NQY3283 to (Evaluate:23Mnb0049)  Requested for: 95Rgj6854; Last   Rx:43Zhe7307 Ordered   2  Aspirin 81 MG Oral Tablet Delayed Release; TAKE 1 TABLET DAILY; Therapy: 47Jit7521 to (Cristina Julia)  Requested for: 60Dfq3075; Last   RU:34WKP8063 Ordered   3  Atorvastatin Calcium 10 MG Oral Tablet; take 1 tablet daily at bedtime; Therapy: 75QDG6177 to (Cristina Julia)  Requested for: 79Oyq2865; Last   SH:95TSA9877 Ordered   4  BD Pen Needle Short U/F 31G X 8 MM Miscellaneous; use to inject Lantus twice daily; Therapy: 47WOB6300 to (Evaluate:35Ncb0586)  Requested for: 41Czb6915; Last   Rx:86Fak5717 Ordered   5  Benzonatate 100 MG Oral Capsule; TAKE 1 CAPSULE 3 TIMES DAILY AS NEEDED; Therapy: 92Fdv0712 to (Evaluate:04Pqd9894)  Requested for: 80Akq9040; Last   Rx:44Bqu2310 Ordered   6  ClonazePAM 1 MG Oral Tablet; Take 1 tablet by mouth daily as needed for anxiety or   panic attack; Therapy: 60PNB0829 to (Evaluate:32Cmh3605); Last Rx:07Tqd2917 Ordered   7  Dulcolax 5 MG Oral Tablet Delayed Release; take 4 tablets together  as per GI pt   handout/instructions; Therapy: 79Rox1099 to (Last Rx:99Ami6857)  Requested for: 92Zde2212 Ordered   8  FreeStyle Lancets Miscellaneous; may check sugar up to 3 times daily; Therapy: 71HJU4630 to (Evaluate:60Lja5217)  Requested for: 57QSH8036; Last   Rx:25Jan2017 Ordered   9  FreeStyle Lite Device; make check blood sugars uo to 3 times daily;    Therapy: 61Tmn7782 to (Evaluate:25Jan2018)  Requested for: 16GBZ5377; Last   Rx:25Jan2017 Ordered   10  FreeStyle Lite Test In Vitro Strip; may test blood sugar up to 3 times daily; Therapy: 93GKH6225 to (Evaluate:79Kmf5342)  Requested for: 02CED0750; Last    Rx:25Jan2017 Ordered   11  Ginseng 250 MG Oral Capsule; Take 1 tab (200mg ginseng) three times daily; Therapy: 96FFA7456 to Recorded   12  HumaLOG KwikPen 100 UNIT/ML Subcutaneous Solution Pen-injector; 15 units three    time daily with meals; Therapy: 55IFM3684 to (Yudyosmani Lanes)  Requested for: 74Uyu5137; Last    Rx:80Jzv4851 Ordered   13  Insulin Syringe 29G X 1/2" 0 5 ML Miscellaneous; for use with humalog 40 units twice a    day with meals; Therapy: 81ZAH2250 to (Evaluate:19Jun2017)  Requested for: 72Xfa7498; Last    Rx:39Yek8659 Ordered   14  Lantus SoloStar 100 UNIT/ML Subcutaneous Solution Pen-injector; INJECT 60 UNIT    Bedtime; Therapy: 43WTA8805 to (Evaluate:45Huj7818)  Requested for: 67DBB2285; Last    Rx:38Hrq1948 Ordered   15  Lisinopril-Hydrochlorothiazide 20-25 MG Oral Tablet; TAKE ONE TABLET BY MOUTH    ONCE DAILY; Therapy: 20ROR8235 to (Evaluate:27Mkc4398)  Requested for: 15Sgs6734; Last    Rx:01Mgp0920 Ordered   16  Polyethylene Glycol 3350 Oral Powder; mix Miralax 238gm bottle into 64oz Gatorade -    drink the night before the colonoscopy and 1 hour after Dulcolax taken; Therapy: 73Tev6805 to (Last Rx:98Jcw2810)  Requested for: 68Wlg3571 Ordered   17  RaNITidine HCl - 150 MG Oral Tablet; TAKE 1 TABLET EVERY 12 HOURS AS NEEDED; Therapy: 45IGQ7804 to (Evaluate:03Gnv8147)  Requested for: 32Lzg0527; Last    Rx:40Fft8273 Ordered   18  Sharps Container Miscellaneous; for use with insulin needles; Therapy: 12TXT3231 to (Last Rx:87Wtk1872) Ordered   19  Sildenafil Citrate 20 MG Oral Tablet; Take 2 to 3 tablets by mouth 30 minutes prior to    sexual activity; Therapy: 43VUS4399 to (73 186660)  Requested for: 03QBO0239; Last    Rx:24Knk4181 Ordered   20   Ventolin  (90 Base) MCG/ACT Inhalation Aerosol Solution; inhale 1 to 2 puffs    every 4 to 6 hours if needed; Therapy: 63Dqo0596 to (Last [facility])  Requested for: 35Cfp8648 Ordered   21  Ziprasidone HCl - 40 MG Oral Capsule; TAKE 1 CAPSULE BY MOUTH DAILY WITH    DINNER;     Therapy: 09YJH8364 to (Evaluate:72Stw7934)  Requested for: 40CDS3586; Last    Rx:86Neb3458 Ordered    Allergies    1  TETANUS    Future Appointments    Date/Time Provider Specialty Site   12/19/2017 04:00 PM Anastasiia Walker RPA-ZIGGY Psychiatry [facility]   12/08/2017 08:00 AM Aman Menchaca MD Gastroenterology Adult [address]   11/22/2017 02:00 PM MAREN Li  [facility]     Signatures   Electronically signed by : Florinda Mobley LCSW; Nov 7 2017 12:10PM EST                       (Author)    Electronically signed by : Pool Lennon, [facility]; Nov 7 2017 12:19PM EST                       (Author)    Electronically signed by : Alta Polo DO; Nov 7 2017 12:24PM EST                       (Review)

## 2018-01-16 NOTE — PROGRESS NOTES
History of Present Illness  HPI: Pt seen by [facility]/JAMAAL this date to assist with insurance questions  Pt is exploring what Medicare plans including Medicare Part D plans might be better for him  JAMAAL did assist pt with using the Medicare gov web site for info as well as call to SS to check on status of his Extra Help application  SS to call pt back directly  Pt will f/u with Pharmacy clinic as he makes his decision ad JAMAAL has also provided pt with info for the [employer] who can provided additional info on Medicare options  Pt relates he is feeling well at this time  Support provided  Diabetic Team to remain available to support and assist as indicted  Active Problems    1  Acid reflux (530 81) (K21 9)   2  Acute bronchitis (466 0) (J20 9)   3  Allergic rhinitis (477 9) (J30 9)   4  Benign essential hypertension (401 1) (I10)   5  Bipolar disease, chronic (296 80) (F31 9)   6  Borderline hyperlipidemia (272 4) (E78 5)   7  Diabetic polyneuropathy (250 60,357 2) (E11 42)   8  Equinus deformity of foot (736 79) (M21 6X9)   9  Erectile dysfunction (607 84) (N52 9)   10  Gasping for breath (786 09) (R06 89)   11  Microalbuminuria (791 0) (R80 9)   12  Morbid obesity with BMI of 50 0-59 9, adult (278 01,V85 43) (E66 01,Z68 43)   13  Need for pneumococcal vaccination (V03 82) (Z23)   14  Need for prophylactic vaccination and inoculation against influenza (V04 81) (Z23)   15  Obesity (278 00) (E66 9)   16  Pes planus of both feet (734) (M21 41,M21 42)   17  Screening for colon cancer (V76 51) (Z12 11)   18  Snoring (786 09) (R06 83)   19  Type 2 diabetes mellitus with hyperglycemia (250 00) (E11 65)   20  Xerosis of skin (706 8) (L85 3)    Current Meds   1  AmLODIPine Besylate 5 MG Oral Tablet; TAKE 1 TABLET DAILY; Therapy: 66UCE0902 to (Evaluate:78Jpv8344)  Requested for: 63Xec4333; Last   Rx:51Wck1509 Ordered   2   Aspirin 81 MG Oral Tablet Delayed Release; TAKE 1 TABLET DAILY; Therapy: 13Yud0475 to (Roberta Bueno)  Requested for: 86Uwl3050; Last   CY:92KNE7183 Ordered   3  Atorvastatin Calcium 10 MG Oral Tablet; take 1 tablet daily at bedtime; Therapy: 60UVZ0876 to (Roberta Bueno)  Requested for: 83Kjx8050; Last   CF:38FHE6209 Ordered   4  BD Pen Needle Short U/F 31G X 8 MM Miscellaneous; use to inject Lantus twice daily; Therapy: 93NKN5649 to (Evaluate:58Owl4599)  Requested for: 88Ool2420; Last   Rx:79Aes0471 Ordered   5  Benzonatate 100 MG Oral Capsule; TAKE 1 CAPSULE 3 TIMES DAILY AS NEEDED; Therapy: 54Cjg1593 to (Evaluate:01Sep2017)  Requested for: 00Dfv4602; Last   Rx:13Czd1014 Ordered   6  Dulcolax 5 MG Oral Tablet Delayed Release; take 4 tablets together  as per GI pt   handout/instructions; Therapy: 33Uez7160 to (Last Rx:92Lur1042)  Requested for: 42Poe4887 Ordered   7  FreeStyle Lancets Miscellaneous; may check sugar up to 3 times daily; Therapy: 18KMA6203 to (Evaluate:65Zuo3124)  Requested for: 20RST6355; Last   Rx:25Jan2017 Ordered   8  FreeStyle Lite Device; make check blood sugars uo to 3 times daily; Therapy: 27Jvu3073 to (Evaluate:25Jan2018)  Requested for: 25Jan2017; Last   Rx:25Jan2017 Ordered   9  FreeStyle Lite Test In Vitro Strip; may test blood sugar up to 3 times daily; Therapy: 97EAE0444 to (Evaluate:83Ota2407)  Requested for: 81ZIN5418; Last   Rx:25Jan2017 Ordered   10  Ginseng 250 MG Oral Capsule; Take 1 tab (200mg ginseng) three times daily; Therapy: 96SIR9643 to Recorded   11  HumaLOG KwikPen 100 UNIT/ML Subcutaneous Solution Pen-injector; 15 units three    time daily with meals; Therapy: 70XQT8376 to (Arjunella Galas)  Requested for: 41Qqm2992; Last    Rx:07Sep2017 Ordered   12  Insulin Syringe 29G X 1/2" 0 5 ML Miscellaneous; for use with humalog 40 units twice a    day with meals; Therapy: 55ZSN5763 to (Evaluate:19Jun2017)  Requested for: 58Obm0540; Last    Rx:85Zap2949 Ordered   13   Lantus SoloStar 100 UNIT/ML Subcutaneous Solution Pen-injector; INJECT 60 UNIT    Bedtime; Therapy: 45ADB9511 to (Evaluate:95Hcw6125)  Requested for: 16KRB8052; Last    Rx:95Jhg5121 Ordered   14  Lisinopril-Hydrochlorothiazide 20-25 MG Oral Tablet; TAKE ONE TABLET BY MOUTH    ONCE DAILY; Therapy: 35EKY2408 to (Evaluate:50Ztb0801)  Requested for: 16Bda1387; Last    Rx:63Bkr6187 Ordered   15  Polyethylene Glycol 3350 Oral Powder; mix Miralax 238gm bottle into 64oz Gatorade -    drink the night before the colonoscopy and 1 hour after Dulcolax taken; Therapy: 13Nee3636 to (Last Rx:06Wop4403)  Requested for: 58Tvj2783 Ordered   16  RaNITidine HCl - 150 MG Oral Tablet; TAKE 1 TABLET EVERY 12 HOURS AS NEEDED; Therapy: 45BFK8246 to (Evaluate:62Cmy8022)  Requested for: 82Axi3503; Last    Rx:59Aqm1258 Ordered   17  Sharps Container Miscellaneous; for use with insulin needles; Therapy: 05LXU4162 to (Last Rx:18Hup6019) Ordered   18  Sildenafil Citrate 20 MG Oral Tablet; Take 2 to 3 tablets by mouth 30 minutes prior to    sexual activity; Therapy: 98UGP4960 to (77 873 135)  Requested for: 59NJS4621; Last    Rx:09Dww4450 Ordered   19  Ventolin  (90 Base) MCG/ACT Inhalation Aerosol Solution; inhale 1 to 2 puffs    every 4 to 6 hours if needed;     Therapy: 17Mkb6532 to (Last Rx:62Ont7948)  Requested for: 09Ncp6527 Ordered    Allergies    1  TETANUS    Future Appointments    Date/Time Provider Specialty Site   11/06/2017 10:00 AM MARILYN Flood Psychiatry [facility]   10/31/2017 01:00 PM Lopez Devine, [address] PCP   12/08/2017 08:00 AM Sosa Pedraza MD Gastroenterology Adult [address]   11/22/2017 02:00 PM MAREN Reeder  [facility]     Signatures   Electronically signed by : Dinh Saunders LCSW; Oct 19 2017  1:32PM EST                       (Author)    Electronically signed by : Meme Marc, [facility]; Oct 19 2017  5:13PM EST                       (Author)    Electronically signed by : DARA Rai ; Oct 19 2017  5:21PM EST                       (Author)

## 2018-01-16 NOTE — PSYCH
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 (Evaluate:65Sxx9208)  Requested for: 35Bnc6301; Last   Rx:05Sep2017 Ordered   5  Lisinopril-Hydrochlorothiazide 20-25 MG Oral Tablet; TAKE ONE TABLET BY MOUTH   ONCE DAILY; Therapy: 34LPT0815 to (Evaluate:48Wac3973)  Requested for: 15Lae9409; Last   Rx:48Eww9501 Ordered    6  ClonazePAM 1 MG Oral Tablet; Take 1 tablet by mouth daily as needed for anxiety or panic   attack; Therapy: 29IEK8364 to (Evaluate:42Ilr8602); Last Rx:06Nov2017 Ordered   7  Ziprasidone HCl - 40 MG Oral Capsule; Take 1 cap by mouth daily with dinner meal;   Therapy: 66BAM5260 to (Evaluate:43Vwp7488)  Requested for: 34QJT9032; Last   Rx:06Nov2017 Ordered    8  Ginseng 250 MG Oral Capsule; Take 1 tab (200mg ginseng) three times daily; Therapy: 70Sur7776 to Recorded   9  Sildenafil Citrate 20 MG Oral Tablet; Take 2 to 3 tablets by mouth 30 minutes prior to   sexual activity; Therapy: 33ZDM9853 to ([phone])  Requested for: 04GXK2355; Last   Rx:42Qgw0760 Ordered    10   FreeStyle Lancets Miscellaneous; may check sugar up to 3 times daily; Therapy: 35JVH3283 to (Evaluate:29Asx8704)  Requested for: 32FNQ7370; Last    Rx:25Jan2017 Ordered   11  FreeStyle Lite Test In Vitro Strip; may test blood sugar up to 3 times daily; Therapy: 35SHV4293 to (Evaluate:40Kvc7022)  Requested for: 49UWB4433; Last    Rx:25Jan2017 Ordered   12  Insulin Syringe 29G X 1/2" 0 5 ML Miscellaneous; for use with humalog 40 units twice a    day with meals; Therapy: 49KMX2659 to (Evaluate:19Jun2017)  Requested for: 52Mxn6577; Last    Rx:09Qry0877 Ordered   13  Sharps Container Miscellaneous; for use with insulin needles; Therapy: 51YOW6855 to (Last Rx:67Zaa1727) Ordered    14  Dulcolax 5 MG Oral Tablet Delayed Release; take 4 tablets together  as per GI pt    handout/instructions; Therapy: 79Bph1317 to (Last Rx:44Cgq6764)  Requested for: 36Hlp9651 Ordered   15  Polyethylene Glycol 3350 Oral Powder; mix Miralax 238gm bottle into 64oz Gatorade -    drink the night before the colonoscopy and 1 hour after Dulcolax taken; Therapy: 94Eor7468 to (Last Rx:33Uog7557)  Requested for: 20Cyc0314 Ordered    16  Aspirin 81 MG Oral Tablet Delayed Release; TAKE 1 TABLET DAILY; Therapy: 63Beb5370 to (Leatha Nathanael)  Requested for: 55Low6755; Last    Rx:26Lqa4031 Ordered   17  Atorvastatin Calcium 10 MG Oral Tablet; take 1 tablet daily at bedtime; Therapy: 72RZJ3526 to (Leatha Nathanael)  Requested for: 73Kwz5974; Last    Rx:27Fhu9684 Ordered   18  BD Pen Needle Short U/F 31G X 8 MM Miscellaneous; use to inject Lantus twice daily; Therapy: 26CSC6823 to (Evaluate:74Uhb3798)  Requested for: 76Buz0304; Last    Rx:77Pcn0988 Ordered   19  FreeStyle Lite Device; make check blood sugars uo to 3 times daily; Therapy: 08Iyd0431 to (Evaluate:25Jan2018)  Requested for: 25Jan2017; Last    Rx:25Jan2017 Ordered   20   HumaLOG KwikPen 100 UNIT/ML Subcutaneous Solution Pen-injector; 15 units three    time daily with meals; Therapy: 74JQR6120 to (Filipe Tapia)  Requested for: 12Gmc0287; Last    Rx:82Btv6560 Ordered   21  Lantus SoloStar 100 UNIT/ML Subcutaneous Solution Pen-injector; INJECT 60 UNIT    Bedtime;     Therapy: 57KRQ8039 to ([phone])  Requested for: 63RPN5109; Last    Rx:52Acw4081 Ordered    Future Appointments    Date/Time Provider Specialty Site   12/19/2017 04:00 PM MARILYN Burns Psychiatry [facility]   12/08/2017 08:00 AM Rebeca Wright MD Gastroenterology Adult [address]   11/22/2017 02:00 PM STEPHANIE SalazarW, ACSW  [facility] THERAPISTS   11/07/2017 11:15 AM Specialty Clinic,   [address] [facility] PCP     Signatures   Electronically signed by : MARILYN Ayala; Nov 6 2017 12:13PM EST                       (Author)    Electronically signed by : DARA Bain ; Nov 6 2017  1:15PM EST                       (Author)

## 2018-01-16 NOTE — MISCELLANEOUS
Message   Recorded as Task   Date: 11/22/2017 03:21 PM, Created By: Avis Davila   Task Name: Medical Complaint Callback   Assigned To: Jessica Littlejohn   Regarding Patient: Shavon Maharaj, Status: Active   Comment:    Malika Jules - 22 Nov 2017 3:21 PM     TASK CREATED  Caller: Self; [phone] (Home)  MARILYN HAS BEEN TAKING ZIPRASIDONE HCL 40MG CAPS 1 CAP WITH DINNER  HE CAN'T GET OUT OF BED AND IS GETTING DIARREA CONSTANTLY  HE DID STOP TAKING IT   BUY HE DOES STILL NEED SOMETHING FOR SLEEP  PLEASE CALL MARILYN WHEN YOU HAVE TIME  MARILYN# [phone]   I called Connor Alcala on home phone # of record and he states he stopped the Ziprasidone a little over a week ago and has not had any further diarrhea since that time  He presently reports being "On edge" and has many kids in the house now  He presently denies SI or HI  No further Ziprasidone  I discussed options for mood control including the risks, benefits and SE of Wilhelmena Leeann, and Saphris  He could not afford [place] in the past but now has insurance  He reports having felt good on it and would like it again  I will prescribe it, but also discussed the back up plan (in order) if [place] is not covered/too expensive, will then go to [address]  Pt verbalized understanding and acceptance of all we spoke of above  He will research his insurance options as well  Return appt is 12/19/2017  I e-scribed the following to his pharmacy:  Latuda 60mg (1/2) tab po qd with dinner meal x 1 week, then (1) tab po qd with dinner # 30      Plan  Bipolar affective disorder, current episode mixed, current episode severity unspecified    · Latuda 60 MG Oral Tablet;  Take 1/2 tab by mouth daily with dinner meal x 1 week,  then take 1 tab daily with dinner meal    Signatures   Electronically signed by : [facility]; Nov 24 2017 12:59PM EST                       (Author)

## 2018-01-17 NOTE — PROGRESS NOTES
Assessment    1  Benign essential hypertension (401 1) (I10)   2  Allergic rhinitis (477 9) (J30 9)   3  Diabetic polyneuropathy (250 60,357 2) (E11 42)   4  Type 2 diabetes mellitus with hyperglycemia (250 00) (E11 65)   5  Morbid obesity with BMI of 50 0-59 9, adult (V85 43) (Z68 43)    Plan  Allergic rhinitis    · Loratadine 10 MG Oral Tablet; TAKE 1 TABLET BY MOUTH EVERY DAY  Benign essential hypertension    · Lisinopril-Hydrochlorothiazide 20-25 MG Oral Tablet; TAKE ONE TABLET BY MOUTH ONCE  DAILY   · (1) COMPREHENSIVE METABOLIC PANEL; Status:Active; Requested for:12Apr2016;   Need for pneumococcal vaccination    · Pneumovax 23 25 MCG/0 5ML Injection Injectable; INJECT 0 5  ML Intramuscular; To Be  Done: 12Apr2016  Type 2 diabetes mellitus    · HumaLOG Mix 75/25 (75-25) 100 UNIT/ML Subcutaneous Suspension; INJECT 40 UNIT  Every twelve hours   · Insulin Syringe 29G X 1/2" 0 5 ML Miscellaneous; for use with humalog 40 units twice a day  with meals   · Hemoglobin A1c- POC; Status:Resulted - Requires Verification,Retrospective By Protocol  Authorization;   Done: 52UHF5881 11:35AM  Type 2 diabetes mellitus with hyperglycemia    · (1) HEMOGLOBIN A1C; Status:Active; Requested for:46Tis7024;    · (1) LIPID PANEL FASTING W DIRECT LDL REFLEX; Status:Active; Requested for:12Apr2016;    · (1) MICROALBUMIN CREATININE RATIO, RANDOM URINE; Status:Active; Requested for:12Apr2016;     Discussion/Summary  Discussion Summary:   Continue same oral medications  continue Lantus  Go back to twice a day Humalog as this is only time your sugars were a bit better  get other labs now  get A1C in 3 months then return here  You need to be checking your sugars daily  I wish we could consider bariatric surgery for you but you agree would not be compliant with the diet requirements so just have to keep increasing your insulin to compensate  I also sent your allergy meds     Counseling Documentation With Imm: The patient was counseled regarding diagnostic results, instructions for management, risk factor reductions, prognosis, impressions, risks and benefits of treatment options  Medication SE Review and Pt Understands Tx: Possible side effects of new medications were reviewed with the patient/guardian today  The treatment plan was reviewed with the patient/guardian  The patient/guardian understands and agrees with the treatment plan      History of Present Illness  Hospital Based Practices Required Assessment:   Pain Assessment   the patient states they do not have pain  The pain is located in the PT STATE HE HAS NO PAIN TODAY  Prefered Language is  ENGLISH  Primary Language is  ENGLISH        HPI: Here for review but never went for labs in Dec  Saw pharmacy in Dec and A1C was elevated and even higher today  not taking the ASA or atorvastatin   taking all other meds  Lantus 50 twice a day and Humalog 40 units in am  continues the omeprazole daily but has not changed his diet as usual and does not exercise  States not checked his blood sugar in months  has started with some allergy symptoms  eye every 6 months  pod every year       Review of Systems  Complete-Male:   Constitutional: feeling tired  ENT: as noted in HPI  Cardiovascular: No complaints of slow heart rate, no fast heart rate, no chest pain, no palpitations, no leg claudication, no lower extremity  Respiratory: No complaints of shortness of breath, no wheezing, no cough, no SOB on exertion, no orthopnea or PND  Gastrointestinal: No complaints of abdominal pain, no constipation, no nausea or vomiting, no diarrhea or bloody stools  Genitourinary: No complaints of dysuria, no incontinence, no hesitancy, no nocturia, no genital lesion, no testicular pain  Musculoskeletal: No complaints of arthralgia, no myalgias, no joint swelling or stiffness, no limb pain or swelling     Integumentary: No complaints of skin rash or skin lesions, no itching, no skin wound, no dry skin    Neurological: tingling and bilat feet  Psychiatric: Is not suicidal, no sleep disturbances, no anxiety or depression, no change in personality, no emotional problems  Endocrine: as noted in HPI  Active Problems    1  Allergic rhinitis (477 9) (J30 9)   2  Benign essential hypertension (401 1) (I10)   3  Bipolar disease, chronic (296 80) (F31 9)   4  Brittle nails (703 8) (L60 3)   5  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)   6  Diabetic polyneuropathy (250 60,357 2) (E11 42)   7  Need for prophylactic vaccination and inoculation against influenza (V04 81) (Z23)   8  Obesity (278 00) (E66 9)   9  Tinea pedis (110 4) (B35 3)   10  Type 2 diabetes mellitus (250 00) (E11 9)   11  Type 2 diabetes mellitus with hyperglycemia (250 00) (E11 65)   12  Xerosis of skin (706 8) (L85 3)    Past Medical History    1  History of Chest congestion (786 9) (R09 89)   2  History of Chewing Difficulty (783 3) (R63 3)   3  History of Dyspepsia (536 8) (K30)   4  History of Erectile dysfunction (607 84) (N52 9)   5  History of peripheral neuropathy (V12 49) (Z86 69)   6  History of upper respiratory infection (V12 09) (Z87 09)    Social History    · Chews tobacco (305 1) (Z72 0)   · Current Smokeless Tobacco User   · Never a smoker   · History of Never A Smoker   · Tobacco use  Social History Reviewed: The social history was reviewed and updated today  The social history was reviewed and is unchanged  Current Meds   1  Ammonium Lactate 12 % External Cream; APPLY  AND RUB  IN A THIN FILM TO AFFECTED AREAS   TWICE DAILY  (AM AND PM); Therapy: 94IOI8571 to (Last Rx:26Jan2016)  Requested for: 26Jan2016 Ordered   2  Aspirin 81 MG Oral Tablet; TAKE 1 TABLET DAILY; Therapy: 80CAQ3379 to (Evaluate:75Ehc0211); Last Rx:58Wbd3582 Ordered   3  Atorvastatin Calcium 10 MG Oral Tablet; take 1 tablet daily at bedtime;    Therapy: 52JMZ9806 to (Evaluate:05Jan2017)  Requested for: 04FQE3704; Last Rx:11Jan2016   Ordered   4  BD Pen Needle Short U/F 31G X 8 MM Miscellaneous; use to inject Lantus twice daily; Therapy: 22FTS7829 to (Evaluate:11Sep2016)  Requested for: 75ZDC2112; Last Rx:32Irw3968   Ordered   5  ClonazePAM 1 MG Oral Tablet; TAKE 1 TABLET AT NIGHT AS NEEDED; Therapy: 67OGQ7653 to (Evaluate:04Sep2015) Recorded   6  DULoxetine HCl - 30 MG Oral Capsule Delayed Release Particles; TAKE 1 CAPSULE DAILY at night; Therapy: 55Xfi6979 to Recorded   7  DULoxetine HCl - 60 MG Oral Capsule Delayed Release Particles; take 1 capsule daily in the morning; Therapy: 87Cwq9727 to Recorded   8  FreeStyle Lancets Miscellaneous; may check sugar up to 3 times daily; Therapy: 28AXD4400 to (Evaluate:04Sep2016)  Requested for: 76KRP0190; Last Rx:08Mar2016   Ordered   9  FreeStyle Lite Test In Vitro Strip; may test blood sugar up to 3 times daily; Therapy: 96PCQ3893 to (Evaluate:29Aug2016)  Requested for: 98OXU7283; Last Rx:02Mar2016   Ordered   10  Gabapentin 300 MG Oral Capsule; take 1 capsule by mouth twice a day; Therapy: 32TEY5732 to (Evaluate:19Jan2016)  Requested for: 21Sep2015; Last Rx:21Sep2015    Ordered   11  HumaLOG Mix 75/25 (75-25) 100 UNIT/ML Subcutaneous Suspension; 40 units every morning with    breakfast;    Therapy: 28Hwv6987 to (Evaluate:19Mar2016)  Requested for: 01FDT9700; Last Rx:21Sep2015    Ordered   12  Insulin Syringe 29G X 1/2" 0 5 ML Miscellaneous; for use with humalog 20 units twice a day with    meals; Therapy: 35TUU8177 to (Evaluate:18Oct2015)  Requested for: 21Apr2015; Last Rx:21Apr2015    Ordered   13  Lantus SoloStar 100 UNIT/ML Subcutaneous Solution Pen-injector; INJECT 50 UNITS EVERY    MORNING AND 50 UNITS AT BEDTIME; Therapy: 10HIX3736 to (Evaluate:05Jun2016)  Requested for: 44AKO3127; Last Rx:07Mar2016    Ordered   14  Latuda 80 MG Oral Tablet; TAKE 1 TAB HS;    Therapy: 11STH6258 to (Evaluate:04Sep2015) Recorded   15  Lisinopril-Hydrochlorothiazide 20-25 MG Oral Tablet; TAKE ONE TABLET BY MOUTH ONCE DAILY; Therapy: 19JPY3133 to (Evaluate:19Mar2016)  Requested for: 26Niv4012; Last Rx:77Zkz8855    Ordered   16  MetFORMIN HCl - 1000 MG Oral Tablet; TAKE 1 TABLET DAILY AS DIRECTED; Therapy: 19ISM3220 to (Rupali Alberts)  Requested for: 49Rgk9742; Last Rx:67Nfq4738    Ordered   17  Omeprazole 20 MG Oral Capsule Delayed Release; take 1 capsule by mouth once daily; Therapy: 10Tvg7084 to (Evaluate:15Mar2016)  Requested for: 34XBP7835; Last Rx:54Ecf4402    Ordered   18  Sharps Container Miscellaneous; for use with insulin needles; Therapy: 48DIS5998 to (Last Rx:75Nqs1067) Ordered    Allergies    1  TETANUS    Vitals  Vital Signs [Data Includes: Current Encounter]    Recorded: 12Apr2016 11:15AM   Temperature 98 1 F   Heart Rate 80   Systolic 805   Diastolic 96   Height 5 ft 5 75 in   Weight 332 lb 14 27 oz   BMI Calculated 54 14   BSA Calculated 2 48     Physical Exam    Constitutional   General appearance: No acute distress, well appearing and well nourished  morbidly obese  Eyes   Conjunctiva and lids: Abnormal   mild injection, watery  Ears, Nose, Mouth, and Throat   Otoscopic examination: Abnormal   cloudy TM but pos light reflex  Pulmonary   Respiratory effort: No increased work of breathing or signs of respiratory distress  Auscultation of lungs: Clear to auscultation, equal breath sounds bilaterally, no wheezes, no rales, no rhonci  Cardiovascular   Auscultation of heart: Normal rate and rhythm, normal S1 and S2, without murmurs  Examination of extremities for edema and/or varicosities: Abnormal   bilateral ankle 2+ pitting edema, but pitting edema present  Carotid pulses: Normal     Abdomen   Abdomen: Non-tender, no masses  The abdomen was obese  Bowel sounds were normal  The abdomen was soft and nontender     Musculoskeletal   Gait and station: Normal     Inspection/palpation of joints, bones, and muscles: Normal     Skin   Skin and subcutaneous tissue: Normal without rashes or lesions  Psychiatric   Orientation to person, place and time: Normal     Mood and affect: Normal          Results/Data  Encounter Results   Hemoglobin A1c- POC 12Apr2016 11:35AM Ny Chan     Test Name Result Flag Reference   HEMOGLOBIN A1C 11 1                     Future Appointments    Date/Time Provider Specialty Site   05/03/2016 10:10 AM Specialty Clinic, [address] [facility]     Signatures   Electronically signed by : Melissa Amaro PAC;  Apr 12 2016 12:32PM EST                       (Author)

## 2018-01-17 NOTE — MISCELLANEOUS
Reason For Visit  Reason For Visit Free Text Note Form: Assistance with obtaining Medication     Case Management Documentation [facility]:   Information obtained from the patient and medical record  He is also dealing with additional issues such as lack of support, chronic/terminal disease and DM  Patient is participating in Medicare, importance of compliance with treatment and [employer] programs  Action Plan: supportive counseling/advocacy, information provided and Medicine Program/ Financial Counselor  plan reviewed  Progress Note  NOELLE has met with pt as per Provider request to assist as possible with obtaining Medications  Pt does have a Medicare D plan but can not afford the co-pays  NOELLE has referred pt to [facility] of the Medicine Program as we maybe able to assist with a few mail order options  Pt's has not paid any significant co-pay as yet this year and will not qualify for the co-pay insulin Program  Noelle has assisted pt with an application for Extra to see id we can get medication cost lowered  Pt to return with required info  Pt also referred to [facility] for [employer] assistance if possible  SW to remain available to assist as indicated,  Active Problems    1  Acute bronchitis (466 0) (J20 9)   2  Allergic rhinitis (477 9) (J30 9)   3  Benign essential hypertension (401 1) (I10)   4  Bipolar disease, chronic (296 80) (F31 9)   5  Diabetic polyneuropathy (250 60,357 2) (E11 42)   6  Equinus deformity of foot (736 79) (M21 6X9)   7  Erectile dysfunction (607 84) (N52 9)   8  Microalbuminuria (791 0) (R80 9)   9  Morbid obesity with BMI of 50 0-59 9, adult (278 01,V85 43) (E66 01,Z68 43)   10  Need for pneumococcal vaccination (V03 82) (Z23)   11  Need for prophylactic vaccination and inoculation against influenza (V04 81) (Z23)   12  Obesity (278 00) (E66 9)   13  Pes planus of both feet (734) (M21 41,M21 42)   14  Screening for colon cancer (V76 51) (Z12 11)   15   Type 2 diabetes mellitus with hyperglycemia (250 00) (E11 65)   16  Xerosis of skin (706 8) (L85 3)    Current Meds   1  Aspirin 81 MG Oral Tablet Delayed Release; TAKE 1 TABLET DAILY; Therapy: 12Oyx1358 to (Evaluate:98Qfo9396)  Requested for: 46Guj9004; Last   Rx:21Dec2016 Ordered   2  Atorvastatin Calcium 10 MG Oral Tablet; take 1 tablet daily at bedtime; Therapy: 24RVZ6894 to (Evaluate:21Yoj7844)  Requested for: 21Dec2016; Last   Rx:21Dec2016 Ordered   3  BD Pen Needle Short U/F 31G X 8 MM Miscellaneous; use to inject Lantus twice daily; Therapy: 77GUY1746 to (Evaluate:29Cvq5361)  Requested for: 21Dec2016; Last   Rx:21Dec2016 Ordered   4  Benzonatate 100 MG Oral Capsule (Tessalon Perles); TAKE 1 CAPSULE 3 TIMES DAILY   AS NEEDED; Therapy: 22Nzk0342 to (Evaluate:36Wly7840)  Requested for: 02Ajq6134; Last   Rx:22Aug2017 Ordered   5  FreeStyle Lancets Miscellaneous; may check sugar up to 3 times daily; Therapy: 66GXW5335 to (Evaluate:44Jyp7690)  Requested for: 00DMD5993; Last   Rx:25Jan2017 Ordered   6  FreeStyle Lite Device; make check blood sugars uo to 3 times daily; Therapy: 19Urp5201 to (Evaluate:25Jan2018)  Requested for: 25Jan2017; Last   Rx:25Jan2017 Ordered   7  FreeStyle Lite Test In Vitro Strip; may test blood sugar up to 3 times daily; Therapy: 67CHP4483 to (Evaluate:70Cvn2237)  Requested for: 85ENR3765; Last   Rx:25Jan2017 Ordered   8  Gabapentin 300 MG Oral Capsule; TAKE 1 CAPSULE TWICE DAILY; Therapy: 06FYK9530 to (Evaluate:21Mar2017)  Requested for: 99Ewq3919; Last   Rx:21Dec2016 Ordered   9  HumaLOG KwikPen 100 UNIT/ML Subcutaneous Solution Pen-injector; 10 units three   time daily with meals; Therapy: 82KWE1673 to (Renew:09Xdd7784)  Requested for: 22HAB9409; Last   Rx:10Jan2017 Ordered   10  Insulin Syringe 29G X 1/2" 0 5 ML Miscellaneous; for use with humalog 40 units twice a    day with meals; Therapy: 43KVJ5074 to (Evaluate:19Jun2017)  Requested for: 21Mbn4517; Last    Rx:27Plw9421 Ordered   11   Lantus SoloStar 100 UNIT/ML Subcutaneous Solution Pen-injector; inject 40 units every    morning and 40 units at bedtime; Therapy: 39XGT6669 to (Evaluate:10Apr2017)  Requested for: 26ZYY5471; Last    Rx:10Jan2017 Ordered   12  Lisinopril-Hydrochlorothiazide 20-25 MG Oral Tablet; TAKE ONE TABLET BY MOUTH    ONCE DAILY; Therapy: 77OBA8222 to (Evaluate:18Feb2018)  Requested for: 19Oxc9633; Last    Rx:09Ssh9408 Ordered   13  Sharps Container Miscellaneous; for use with insulin needles; Therapy: 29SYM0557 to (Last Rx:47Vxt7073) Ordered   14  Ventolin  (90 Base) MCG/ACT Inhalation Aerosol Solution; inhale 1 to 2 puffs    every 4 to 6 hours if needed; Therapy: 50Kge9983 to (Last Nova Flo)  Requested for: 45Aws7822 Ordered   15  Viagra 100 MG Oral Tablet; TAKE 1 TABLET DAILY 1 HOUR BEFORE NEEDED;     Therapy: 51CQQ1738 to (Evaluate:30Jan2017)  Requested for: 10KKQ9005; Last    Rx:10Jan2017 Ordered    Allergies    1  TETANUS    Signatures   Electronically signed by : Diaz Mathews LCSW; Aug 30 2017  3:38PM EST                       (Author)

## 2018-01-17 NOTE — PROGRESS NOTES
History of Present Illness  HPI: Pt and S/o seen by Chu Leblanc [facility] this date  Team has assisted pt with completing an extra Help application which has been mailed in this date  Team also assisted pt with exploring possible new Medicare D medication  Pt wanted to see if he could get on [facility] but because [place] not have [place] this [place] an option in [place]  Pt to review info from Medicare   gov and to call [phone] MEDICARE for further assistance  Pt to also f/u with Connie with required info for [employer] Assistance  In addition, at pt's request referral made to [facility] to see if her can get f/u [address] care  Supportive counseling provided BHS/SW and the Diabetic Team to remain available to support and assist as indicated  Active Problems    1  Acid reflux (530 81) (K21 9)   2  Acute bronchitis (466 0) (J20 9)   3  Allergic rhinitis (477 9) (J30 9)   4  Benign essential hypertension (401 1) (I10)   5  Bipolar disease, chronic (296 80) (F31 9)   6  Borderline hyperlipidemia (272 4) (E78 5)   7  Diabetic polyneuropathy (250 60,357 2) (E11 42)   8  Equinus deformity of foot (736 79) (M21 6X9)   9  Erectile dysfunction (607 84) (N52 9)   10  Gasping for breath (786 09) (R06 89)   11  Microalbuminuria (791 0) (R80 9)   12  Morbid obesity with BMI of 50 0-59 9, adult (278 01,V85 43) (E66 01,Z68 43)   13  Need for pneumococcal vaccination (V03 82) (Z23)   14  Need for prophylactic vaccination and inoculation against influenza (V04 81) (Z23)   15  Obesity (278 00) (E66 9)   16  Pes planus of both feet (734) (M21 41,M21 42)   17  Screening for colon cancer (V76 51) (Z12 11)   18  Snoring (786 09) (R06 83)   19  Type 2 diabetes mellitus with hyperglycemia (250 00) (E11 65)   20  Xerosis of skin (706 8) (L85 3)    Current Meds   1  AmLODIPine Besylate 5 MG Oral Tablet; TAKE 1 TABLET DAILY;    Therapy: 57CKZ4755 to (Evaluate:41Kqh4073)  Requested for: 67Tfd5558; Last Rx: 15QMZ1224 Ordered   2  Aspirin 81 MG Oral Tablet Delayed Release; TAKE 1 TABLET DAILY; Therapy: 68Bmg6789 to (Luisa Seaman)  Requested for: 88Rab9637; Last   UY:11TTO2596 Ordered   3  Atorvastatin Calcium 10 MG Oral Tablet; take 1 tablet daily at bedtime; Therapy: 73UHZ2514 to (Luisa Seaman)  Requested for: 27Jfb2635; Last   AY:80XUF3838 Ordered   4  BD Pen Needle Short U/F 31G X 8 MM Miscellaneous; use to inject Lantus twice daily; Therapy: 63WRX0563 to (Evaluate:15Xat2324)  Requested for: 62Jin8985; Last   Rx:39Yrd5422 Ordered   5  Benzonatate 100 MG Oral Capsule; TAKE 1 CAPSULE 3 TIMES DAILY AS NEEDED; Therapy: 26Ktp3442 to (Evaluate:32Bjx4080)  Requested for: 52Qlb3802; Last   Rx:62Jsk3385 Ordered   6  Dulcolax 5 MG Oral Tablet Delayed Release; take 4 tablets together  as per GI pt   handout/instructions; Therapy: 40Qqh8325 to (Last Rx:39Bpj9592)  Requested for: 99Spn0878 Ordered   7  FreeStyle Lancets Miscellaneous; may check sugar up to 3 times daily; Therapy: 32YIP0207 to (Evaluate:33Uxe4758)  Requested for: 57MLF0286; Last   Rx:25Jan2017 Ordered   8  FreeStyle Lite Device; make check blood sugars uo to 3 times daily; Therapy: 66Vfu7036 to (Evaluate:25Jan2018)  Requested for: 25Jan2017; Last   Rx:25Jan2017 Ordered   9  FreeStyle Lite Test In Vitro Strip; may test blood sugar up to 3 times daily; Therapy: 50KGN1295 to (Evaluate:08Xfu4982)  Requested for: 94ZEH7477; Last   Rx:25Jan2017 Ordered   10  Ginseng 250 MG Oral Capsule; Take 1 tab (200mg ginseng) three times daily; Therapy: 57SSZ4636 to Recorded   11  HumaLOG KwikPen 100 UNIT/ML Subcutaneous Solution Pen-injector; 15 units three    time daily with meals; Therapy: 10TBY0224 to (Radha Diggs)  Requested for: 77Pyx3747; Last    Rx:82Atk8845 Ordered   12  Insulin Syringe 29G X 1/2" 0 5 ML Miscellaneous; for use with humalog 40 units twice a    day with meals;     Therapy: 17HXW2804 to (Evaluate:19Jun2017)  Requested for: 84Xvb3389; Last    Rx:70Fpq9964 Ordered   13  Lantus SoloStar 100 UNIT/ML Subcutaneous Solution Pen-injector; Inject 50 units at    bedtime; Therapy: 09KVH1978 to (Evaluate:13Edx6760)  Requested for: 83Xaw8742; Last    Rx:39Mlx0292 Ordered   14  Lisinopril-Hydrochlorothiazide 20-25 MG Oral Tablet; TAKE ONE TABLET BY MOUTH    ONCE DAILY; Therapy: 06LES3921 to (Evaluate:93Ehz6458)  Requested for: 83Jbd0423; Last    Rx:80Woh5980 Ordered   15  Polyethylene Glycol 3350 Oral Powder; mix Miralax 238gm bottle into 64oz Gatorade -    drink the night before the colonoscopy and 1 hour after Dulcolax taken; Therapy: 23Hsa3962 to (Last Rx:06Vcj7141)  Requested for: 72Jfc2016 Ordered   16  RaNITidine HCl - 150 MG Oral Tablet; TAKE 1 TABLET EVERY 12 HOURS AS NEEDED; Therapy: 75IOT1601 to (Evaluate:84Afj5654)  Requested for: 62Xrh3906; Last    Rx:85Nvt5253 Ordered   17  Sharps Container Miscellaneous; for use with insulin needles; Therapy: 18WNG9985 to (Last Rx:15Qdj9652) Ordered   18  Ventolin  (90 Base) MCG/ACT Inhalation Aerosol Solution; inhale 1 to 2 puffs    every 4 to 6 hours if needed;     Therapy: 22Olp5748 to (Last Rx:89Jbw9619)  Requested for: 24Aus9150 Ordered    Allergies    1  TETANUS    Future Appointments    Date/Time Provider Specialty Site   09/28/2017 09:00 AM Conner Hemphill [facility] [facility] PCP   12/08/2017 08:00 AM Dilip Piper MD Gastroenterology Adult [facility]   Electronically signed by : Marcela Alberts LCSW; Sep 26 2017  5:45PM EST                       (Author)    Electronically signed by : Yunior Sanford, [facility]; Sep 26 2017  5:55PM EST                       (Author)

## 2018-01-18 NOTE — MISCELLANEOUS
Reason For Visit  Reason For Visit Free Text Note Form: BHS/JAMAAL spoke with pt via the phone this date to introduce the Diabetic Education and Support Program  Pt is a 50 y/o  male pt  Pt reports he has not been taking all of his mediations or going to his [facility] appointments because he lost some medical coverage  Pt does relate he has Medicare only  SW did suggest he try a counseling agency that accepts Medicare, [facility]  Pt to f/u with same as he relates he suffers from depression and panic attacks  No SI/HI noted at this time  SW has also encouraged him to apply for [employer] with [facility] our Financial Counselor  In addition SW offered to refer pt to our Medicine Program ( Pt has Medicare D but has difficulty with co-pays ) Pt declined  Pt is interested in attending our up coming classes  The diabetic Team to f/u with pt re same  Pt has completed the [facility] Screening Assessment this date  Active Problems    1  Allergic rhinitis (477 9) (J30 9)   2  Benign essential hypertension (401 1) (I10)   3  Bipolar disease, chronic (296 80) (F31 9)   4  Diabetic polyneuropathy (250 60,357 2) (E11 42)   5  Equinus deformity of foot (736 79) (M21 6X9)   6  Erectile dysfunction (607 84) (N52 9)   7  Microalbuminuria (791 0) (R80 9)   8  Morbid obesity with BMI of 50 0-59 9, adult (278 01,V85 43) (E66 01,Z68 43)   9  Need for pneumococcal vaccination (V03 82) (Z23)   10  Need for prophylactic vaccination and inoculation against influenza (V04 81) (Z23)   11  Obesity (278 00) (E66 9)   12  Pes planus of both feet (734) (M21 41,M21 42)   13  Type 2 diabetes mellitus with hyperglycemia (250 00) (E11 65)   14  Xerosis of skin (706 8) (L85 3)    Current Meds   1  Aspirin 81 MG Oral Tablet Delayed Release; TAKE 1 TABLET DAILY; Therapy: 80Vnt8708 to (Evaluate:35Ffq9243)  Requested for: 09Pbc0049; Last   Rx:46Hyl0704 Ordered   2  Atorvastatin Calcium 10 MG Oral Tablet; take 1 tablet daily at bedtime;    Therapy: 16LCP0828 to (Evaluate:48Tqa7784)  Requested for: 70Zsh7497; Last   Rx:21Dec2016 Ordered   3  BD Pen Needle Short U/F 31G X 8 MM Miscellaneous; use to inject Lantus twice daily; Therapy: 13WJU9950 to (Evaluate:91Jtw2803)  Requested for: 65Lgl0384; Last   Rx:21Dec2016 Ordered   4  FreeStyle Lancets Miscellaneous; may check sugar up to 3 times daily; Therapy: 28HLX5412 to (Evaluate:13Gqp1595)  Requested for: 01TCO0509; Last   Rx:25Jan2017 Ordered   5  FreeStyle Lite Device; make check blood sugars uo to 3 times daily; Therapy: 86Jti6696 to (Evaluate:25Jan2018)  Requested for: 25Jan2017; Last   Rx:25Jan2017 Ordered   6  FreeStyle Lite Test In Vitro Strip; may test blood sugar up to 3 times daily; Therapy: 73XFD6972 to (Evaluate:66Qgt9640)  Requested for: 15VAI5213; Last   Rx:25Jan2017 Ordered   7  Gabapentin 300 MG Oral Capsule; TAKE 1 CAPSULE TWICE DAILY; Therapy: 38LGM1268 to (Evaluate:21Mar2017)  Requested for: 90Kwo3585; Last   Rx:21Dec2016 Ordered   8  HumaLOG KwikPen 100 UNIT/ML Subcutaneous Solution Pen-injector; 10 units three   time daily with meals; Therapy: 54AGZ6780 to (Renew:35Mbe6725)  Requested for: 01OHP9959; Last   Rx:10Jan2017 Ordered   9  Insulin Syringe 29G X 1/2" 0 5 ML Miscellaneous; for use with humalog 40 units twice a   day with meals; Therapy: 88XNW6277 to (Evaluate:19Jun2017)  Requested for: 46Juc9573; Last   Rx:21Dec2016 Ordered   10  Lantus SoloStar 100 UNIT/ML Subcutaneous Solution Pen-injector; inject 40 units every    morning and 40 units at bedtime; Therapy: 45HFE1550 to (Evaluate:25Ozt0204)  Requested for: 62IEC0411; Last    Rx:10Jan2017 Ordered   11  Lisinopril-Hydrochlorothiazide 20-25 MG Oral Tablet; TAKE ONE TABLET BY MOUTH    ONCE DAILY; Therapy: 55YZA5579 to (Evaluate:19Jun2017)  Requested for: 21Dec2016; Last    Rx:21Dec2016 Ordered   12  MetFORMIN HCl - 1000 MG Oral Tablet; TAKE 1 TABLET DAILY AS DIRECTED;     Therapy: 91RCJ4295 to (Evaluate:19Jun2017) Requested for: 92Icz7135; Last    Rx:01Iuv1032 Ordered   13  Sharps Container Miscellaneous; for use with insulin needles; Therapy: 64RNL7359 to (Last Rx:73Unh5464) Ordered   14  Viagra 100 MG Oral Tablet; TAKE 1 TABLET DAILY 1 HOUR BEFORE NEEDED;     Therapy: 86ZQY4413 to (Evaluate:30Jan2017)  Requested for: 02TLT2880; Last    Rx:10Jan2017 Ordered    Allergies    1  TETANUS    Signatures   Electronically signed by : Volodymyr Morton LCSW; [facility] 15 2017  6:11PM EST                       (Author)

## 2018-01-18 NOTE — PROGRESS NOTES
History of Present Illness  HPI: Patient seen by [facility] - Diabetes BHS/JAMAAL this date as follow up to help with obtaining Medications  Pt still needs to bring in info for his extra help application  SW will assist pt explore his current coverage and help as possible  Pt not able to stay today but will come back next week  Active Problems    1  Acid reflux (530 81) (K21 9)   2  Acute bronchitis (466 0) (J20 9)   3  Allergic rhinitis (477 9) (J30 9)   4  Benign essential hypertension (401 1) (I10)   5  Bipolar disease, chronic (296 80) (F31 9)   6  Borderline hyperlipidemia (272 4) (E78 5)   7  Diabetic polyneuropathy (250 60,357 2) (E11 42)   8  Equinus deformity of foot (736 79) (M21 6X9)   9  Erectile dysfunction (607 84) (N52 9)   10  Gasping for breath (786 09) (R06 89)   11  Microalbuminuria (791 0) (R80 9)   12  Morbid obesity with BMI of 50 0-59 9, adult (278 01,V85 43) (E66 01,Z68 43)   13  Need for pneumococcal vaccination (V03 82) (Z23)   14  Need for prophylactic vaccination and inoculation against influenza (V04 81) (Z23)   15  Obesity (278 00) (E66 9)   16  Pes planus of both feet (734) (M21 41,M21 42)   17  Screening for colon cancer (V76 51) (Z12 11)   18  Snoring (786 09) (R06 83)   19  Type 2 diabetes mellitus with hyperglycemia (250 00) (E11 65)   20  Xerosis of skin (706 8) (L85 3)    Current Meds   1  AmLODIPine Besylate 5 MG Oral Tablet; TAKE 1 TABLET DAILY; Therapy: 09NWB3822 to (Evaluate:97Ysg6113)  Requested for: 03Tga7544; Last   Rx:68Fyy2030 Ordered   2  Aspirin 81 MG Oral Tablet Delayed Release; TAKE 1 TABLET DAILY; Therapy: 78Omq7416 to (Evaluate:58Hkd6232)  Requested for: 63Dui2133; Last   Rx:03Ikd8102 Ordered   3  Atorvastatin Calcium 10 MG Oral Tablet; take 1 tablet daily at bedtime; Therapy: 49IIN0548 to (Evaluate:29Ryq5401)  Requested for: 05Wxq3919; Last   Rx:83Ulb2860 Ordered   4   BD Pen Needle Short U/F 31G X 8 MM Miscellaneous; use to inject Lantus twice daily; Therapy: 49PDJ7815 to (Evaluate:77Pxc4563)  Requested for: 41Njp5883; Last   Rx:21Dec2016 Ordered   5  Benzonatate 100 MG Oral Capsule; TAKE 1 CAPSULE 3 TIMES DAILY AS NEEDED; Therapy: 94Gpt1724 to (Evaluate:01Sep2017)  Requested for: 91Pei9535; Last   Rx:22Aug2017 Ordered   6  FreeStyle Lancets Miscellaneous; may check sugar up to 3 times daily; Therapy: 22PMA4491 to (Evaluate:79Fhz6324)  Requested for: 85CZP6694; Last   Rx:25Jan2017 Ordered   7  FreeStyle Lite Device; make check blood sugars uo to 3 times daily; Therapy: 91Ldw8184 to (Evaluate:25Jan2018)  Requested for: 25Jan2017; Last   Rx:25Jan2017 Ordered   8  FreeStyle Lite Test In Vitro Strip; may test blood sugar up to 3 times daily; Therapy: 51QPF9190 to (Evaluate:91Gov4445)  Requested for: 94EGV8551; Last   Rx:25Jan2017 Ordered   9  Ginseng 250 MG Oral Capsule; Take 1 tab (200mg ginseng) three times daily; Therapy: 61VJX5997 to Recorded   10  HumaLOG KwikPen 100 UNIT/ML Subcutaneous Solution Pen-injector; 10 units three    time daily with meals; Therapy: 02VED4256 to (Renew:03Skb8460)  Requested for: 77OFB9106; Last    Rx:10Jan2017 Ordered   11  Insulin Syringe 29G X 1/2" 0 5 ML Miscellaneous; for use with humalog 40 units twice a    day with meals; Therapy: 60TAL4745 to (Evaluate:19Jun2017)  Requested for: 91Yrg0475; Last    Rx:21Dec2016 Ordered   12  Lantus SoloStar 100 UNIT/ML Subcutaneous Solution Pen-injector; inject 40 units every    morning and 40 units at bedtime; Therapy: 59QJY0235 to (Evaluate:38Yzj8347)  Requested for: 74WSD0105; Last    Rx:10Jan2017 Ordered   13  Lisinopril-Hydrochlorothiazide 20-25 MG Oral Tablet; TAKE ONE TABLET BY MOUTH    ONCE DAILY; Therapy: 71XAA0754 to (Evaluate:18Feb2018)  Requested for: 04Gcr8967; Last    Rx:11Qww2441 Ordered   14  RaNITidine HCl - 150 MG Oral Tablet; TAKE 1 TABLET EVERY 12 HOURS AS NEEDED;     Therapy: 14RWS9774 to (Evaluate:68Pfh4925)  Requested for: 84Qha7943; Last Rx: 27RZO5637 Ordered   15  Sharps Container Miscellaneous; for use with insulin needles; Therapy: 40PNR2661 to (Last Rx:51Cmp3471) Ordered   16  Ventolin  (90 Base) MCG/ACT Inhalation Aerosol Solution; inhale 1 to 2 puffs    every 4 to 6 hours if needed; Therapy: 45Nde6536 to (Last Rx:61Trz1469)  Requested for: 40Ukr6749 Ordered    Allergies    1  TETANUS    Vitals  Signs    Systolic: 481, LUE, Sitting  Diastolic: 78, LUE, Sitting    Assessment    1  Type 2 diabetes mellitus with hyperglycemia (250 00) (E11 65)   2  Erectile dysfunction (607 84) (N52 9)    Plan  Type 2 diabetes mellitus with hyperglycemia    · Aspirin 81 MG Oral Tablet Delayed Release; TAKE 1 TABLET DAILY   · Atorvastatin Calcium 10 MG Oral Tablet; take 1 tablet daily at bedtime   · HumaLOG KwikPen 100 UNIT/ML Subcutaneous Solution Pen-injector; 15 units  three time daily with meals   · Lantus SoloStar 100 UNIT/ML Subcutaneous Solution Pen-injector;  Inject 50  units at bedtime    Future Appointments    Date/Time Provider Specialty Site   09/28/2017 09:00 AM Greg Griffith, [address] PCP     Signatures   Electronically signed by : Leander Au LCSW; Sep  7 2017  5:01PM EST                       (Author)    Electronically signed by : Jennifer Alfredo, [facility]; Sep 11 2017  7:28AM EST                       (Author)

## 2018-01-22 VITALS
DIASTOLIC BLOOD PRESSURE: 80 MMHG | SYSTOLIC BLOOD PRESSURE: 113 MMHG | HEIGHT: 66 IN | WEIGHT: 315 LBS | HEART RATE: 83 BPM | BODY MASS INDEX: 50.62 KG/M2

## 2018-01-22 VITALS — HEIGHT: 66 IN | WEIGHT: 315 LBS | BODY MASS INDEX: 50.62 KG/M2

## 2018-01-22 VITALS — DIASTOLIC BLOOD PRESSURE: 78 MMHG | SYSTOLIC BLOOD PRESSURE: 120 MMHG

## 2018-01-23 VITALS
HEART RATE: 80 BPM | HEIGHT: 66 IN | SYSTOLIC BLOOD PRESSURE: 120 MMHG | BODY MASS INDEX: 50.62 KG/M2 | WEIGHT: 315 LBS | DIASTOLIC BLOOD PRESSURE: 84 MMHG | TEMPERATURE: 97.9 F

## 2018-01-23 NOTE — PROGRESS NOTES
History of Present Illness  HPI: Pt seen by [facility] Diabetic BHS/SW this date as f/u  Pt relates he is feeling alright  He also relates he has decided to keep the same insurance her had last year  He feels it will work better for him  Pt does relates he is getting all of his medications  The Diabetic Team will remain available to support and assist as indicated  Active Problems     1  Acid reflux (530 81) (K21 9)   2  Allergic rhinitis (477 9) (J30 9)   3  Benign essential hypertension (401 1) (I10)   4  Borderline hyperlipidemia (272 4) (E78 5)   5  Cataract, left eye (366 9) (H26 9)   6  Creatinine elevation (790 99) (R79 89)   7  Diabetic polyneuropathy (250 60,357 2) (E11 42)   8  Equinus deformity of foot (736 79) (M21 6X9)   9  Erectile dysfunction (607 84) (N52 9)   10  Microalbuminuria (791 0) (R80 9)   11  Morbid obesity with BMI of 50 0-59 9, adult (278 01,V85 43) (E66 01,Z68 43)   12  Need for pneumococcal vaccination (V03 82) (Z23)   13  Need for prophylactic vaccination and inoculation against influenza (V04 81) (Z23)   14  Obesity (278 00) (E66 9)   15  Pes planus of both feet (734) (M21 41,M21 42)   16  Screening for colon cancer (V76 51) (Z12 11)   17  Snoring (786 09) (R06 83)   18  Type 2 diabetes mellitus with hyperglycemia (250 00) (E11 65)   19  Xerosis of skin (706 8) (L85 3)    Bipolar affective disorder, current episode mixed, current episode severity unspecified (296 60) (F31 60)       Other anxiety states (300 09) (F41 1)       Panic attacks (300 01) (F41 0)          Current Meds   1  AmLODIPine Besylate 5 MG Oral Tablet; take 1 tablet by mouth daily; Therapy: 60ADE3381 to (0474 77 19 07)  Requested for: 70GFT4379; Last   Rx:54Tsi1211 Ordered   2  Aspirin 81 MG Oral Tablet Delayed Release; TAKE 1 TABLET DAILY; Therapy: 88Pwz1586 to (Sipke Melchor)  Requested for: 87Len6919; Last   SE:90NJN9808 Ordered   3   Atorvastatin Calcium 10 MG Oral Tablet; take 1 tablet daily at bedtime; Therapy: 24ZJB7854 to (El Munoz)  Requested for: 07Sep2017; Last   TI:11IGO8978 Ordered   4  BD Pen Needle Short U/F 31G X 8 MM Miscellaneous; use to inject Lantus twice daily; Therapy: 97BSH3003 to (Evaluate:42Nin8603)  Requested for: 20Jmt6247; Last   Rx:21Dec2016 Ordered   5  ClonazePAM 1 MG Oral Tablet; Take 1 tablet by mouth daily as needed for anxiety or   panic attack; Therapy: 19RYJ6084 to (Evaluate:05Jgh2906); Last Rx:06Nov2017 Ordered   6  Dulcolax 5 MG Oral Tablet Delayed Release; take 4 tablets together  as per GI pt   handout/instructions; Therapy: 75Xql9532 to (Last Rx:18Sep2017)  Requested for: 18Sep2017 Ordered   7  FreeStyle Lancets Miscellaneous; may check sugar up to 3 times daily; Therapy: 47GYU9026 to (Evaluate:52Fuz7828)  Requested for: 13LBS1013; Last   Rx:25Jan2017 Ordered   8  FreeStyle Lite Device; make check blood sugars uo to 3 times daily; Therapy: 46Aah9572 to (Evaluate:25Jan2018)  Requested for: 25Jan2017; Last   Rx:25Jan2017 Ordered   9  FreeStyle Lite Test In Vitro Strip; may test blood sugar up to 3 times daily; Therapy: 67LGP3002 to (Evaluate:59Yjg9698)  Requested for: 77KGJ0087; Last   Rx:25Jan2017 Ordered   10  Ginseng 250 MG Oral Capsule; Take 1 tab (200mg ginseng) three times daily; Therapy: 19ITO5008 to Recorded   11  HumaLOG KwikPen 100 UNIT/ML Subcutaneous Solution Pen-injector; 15 units three    time daily with meals; Therapy: 78RCH0609 to (Sandra Dopp)  Requested for: 07Sep2017; Last    Rx:07Sep2017 Ordered   12  Insulin Syringe 29G X 1/2" 0 5 ML Miscellaneous; for use with humalog 40 units twice a    day with meals; Therapy: 03DXY1687 to (Evaluate:19Jun2017)  Requested for: 58Npo9095; Last    Rx:21Dec2016 Ordered   13  Lantus SoloStar 100 UNIT/ML Subcutaneous Solution Pen-injector; INJECT 60 UNIT    Bedtime; Therapy: 22YPX3875 to (Evaluate:63Apz8422)  Requested for: 98WDW3776; Last    Rx:28Sep2017 Ordered   14  Latuda 60 MG Oral Tablet; Take 1/2 tab by mouth daily with dinner meal x 1 week, then    take 1 tab daily with dinner meal;    Therapy: 30RWE7055 to (Evaluate:14Hma8054)  Requested for: 81MKC3696; Last    Rx:24Nov2017 Ordered   15  Lisinopril-Hydrochlorothiazide 20-25 MG Oral Tablet; TAKE ONE TABLET BY MOUTH    ONCE DAILY; Therapy: 60WIN8811 to (Evaluate:72Cyt0908)  Requested for: 97Qrh4955; Last    Rx:94Avm1160 Ordered   16  Omeprazole 20 MG Oral Tablet Delayed Release; Take one tablet daily; Therapy: 36XGM8725 to (Evaluate:50Gvj4132)  Requested for: 77NTS7291; Last    Rx:07Nov2017 Ordered   17  Polyethylene Glycol 3350 Oral Powder; mix Miralax 238gm bottle into 64oz Gatorade -    drink the night before the colonoscopy and 1 hour after Dulcolax taken; Therapy: 76Nzr1923 to (Last Rx:94Eks5043)  Requested for: 75Mvz6047 Ordered   18  RaNITidine HCl - 150 MG Oral Tablet; TAKE 1 TABLET EVERY 12 HOURS AS NEEDED; Therapy: 03GUO6867 to (Evaluate:95Ahs6811)  Requested for: 98Kih0211; Last    Rx:56Gst5763 Ordered   19  Sharps Container Miscellaneous; for use with insulin needles; Therapy: 32BGJ4058 to (Last Rx:92Inm4028) Ordered   20  Sildenafil Citrate 20 MG Oral Tablet; Take 2 to 3 tablets by mouth 30 minutes prior to    sexual activity; Therapy: 21WFW4746 to ()  Requested for: 86TMC2318; Last    Rx:74Dzq9552 Ordered   21  Ventolin  (90 Base) MCG/ACT Inhalation Aerosol Solution; inhale 1 to 2 puffs    every 4 to 6 hours if needed; Therapy: 47Qpq8174 to (Last Missy Kim)  Requested for: 44Ouj9129 Ordered   22  Ziprasidone HCl - 40 MG Oral Capsule; TAKE 1 CAPSULE BY MOUTH DAILY WITH    DINNER;     Therapy: 58POO2855 to (Evaluate:63Kug3909)  Requested for: 05ATR9480; Last    Rx:06Nov2017 Ordered    Allergies    1  TETANUS    Vitals  Signs    Temperature: 97 9 F  Heart Rate: 80  Systolic: 319  Diastolic: 84  Height: 5 ft 6 in  Weight: 320 lb 1 70 oz  BMI Calculated: 51 67  BSA Calculated: 2 44    Assessment    1  Cataract, left eye (366 9) (H26 9)   2  Morbid obesity with BMI of 50 0-59 9, adult (278 01,V85 43) (E66 01,Z68 43)   3  Type 2 diabetes mellitus with hyperglycemia (250 00) (E11 65)   4  Benign essential hypertension (401 1) (I10)   5   Bipolar affective disorder, current episode mixed, current episode severity unspecified   (296 60) (F31 60)    Future Appointments    Date/Time Provider Specialty Site   02/02/2018 04:00 PM MARILYN Joyner Psychiatry [address] PS[facility]CHIATRIC ASSOC   01/18/2018 12:30 PM MAREN Li  [facility] ASSOC THERAPISTS   02/06/2018 12:00 PM Jose Valle LCSW, [facility] ASSOC THERAPISTS   03/06/2018 12:00 PM Jose Valle LCSW, [facility] ASSOC THERAPISTS   12/20/2018 12:30 PM Miquel Nova [address] ASSOC THERAPISTS     Signatures   Electronically signed by : Florinda Mobley LCSW; Dec 18 2017  7:07PM EST                       (Author)    Electronically signed by : Pool Lennon, [facility]; Dec 20 2017  4:36PM EST                       (Author)

## 2018-01-23 NOTE — CONSULTS
Chief Complaint  Pre-op for cataract surgery       History of Present Illness  Pre-Op Visit (Brief): The patient is being seen for a preoperative visit and cataracts  The procedure is a(n) Cataracts bilat scheduled for 12/27 and 1/10  The indication for surgery is cataracts  Surgical Risk Assessment:   Prior Anesthesia: He had prior anesthesia and Local for sutures only  Pertinent Past Medical History: diabetes and insulin use  Exercise Capacity: patient gets SOB due to obesity  Hospital Based Practices Required Assessment:   Pain Assessment   the patient states they do not have pain  Prefered Language is  english  Primary Language is  english  HPI: Did not get the labs completed yet, and getting A1C in office today as not completed since 8/2017  Also states did not get the EGD / colonoscopy as states never received call regarding food to eat/ not eat and has not been rescheduled  local anesthesia only no general  No adverse reaction to local anesthesia  still 1 can a day chewing tobacco  Humalog 20 units once a day evening meal as only eats once a day  Lantus 60 units daily  A1C in office today went up to 9 4% from 8 1%  Last time checked sugar was 2-3 weeks ago not know number  Does not check before humalog         Review of Systems    Constitutional: No fever or chills, feels well, no tiredness, no recent weight gain or weight loss  Eyes: eyesight problems, but as noted in HPI  Cardiovascular: No complaints of slow heart rate, no fast heart rate, no chest pain, no palpitations, no leg claudication, no lower extremity  Respiratory: No complaints of shortness of breath, no wheezing, no cough, no SOB on exertion, no orthopnea or PND  Gastrointestinal: abdominal pain, but as noted in HPI  Musculoskeletal: No complaints of arthralgia, no myalgias, no joint swelling or stiffness, no limb pain or swelling     Integumentary: No complaints of skin rash or skin lesions, no itching, no skin wound, no dry skin  Neurological: no headache  Psychiatric: anxiety, depression and followed by mental health, but as noted in HPI  Endocrine: as noted in HPI  ROS reviewed  Active Problems    1  Acid reflux (530 81) (K21 9)   2  Allergic rhinitis (477 9) (J30 9)   3  Benign essential hypertension (401 1) (I10)   4  Bipolar affective disorder, current episode mixed, current episode severity unspecified   (296 60) (F31 60)   5  Borderline hyperlipidemia (272 4) (E78 5)   6  Diabetic polyneuropathy (250 60,357 2) (E11 42)   7  Equinus deformity of foot (736 79) (M21 6X9)   8  Erectile dysfunction (607 84) (N52 9)   9  Microalbuminuria (791 0) (R80 9)   10  Morbid obesity with BMI of 50 0-59 9, adult (278 01,V85 43) (E66 01,Z68 43)   11  Need for pneumococcal vaccination (V03 82) (Z23)   12  Need for prophylactic vaccination and inoculation against influenza (V04 81) (Z23)   13  Obesity (278 00) (E66 9)   14  Other anxiety states (300 09) (F41 1)   15  Panic attacks (300 01) (F41 0)   16  Pes planus of both feet (734) (M21 41,M21 42)   17  Screening for colon cancer (V76 51) (Z12 11)   18  Snoring (786 09) (R06 83)   19  Type 2 diabetes mellitus with hyperglycemia (250 00) (E11 65)   20  Xerosis of skin (706 8) (L85 3)    Past Medical History    The active problems and past medical history were reviewed and updated today  Surgical History    · History of Repair Of Superficial Wound Face    The surgical history was reviewed and updated today  Social History    · Chews tobacco (305 1) (Z72 0)   · Current Smokeless Tobacco User   · Has no children   · Never a smoker   · History of Never A Smoker   · On disability   · On SSD since approx 2015 for psychiatric illness  ·  from spouse (V61 03) (Z63 5)   · Tobacco use   · experimented over the course of a week in preteen years then did not like it so he started      chewing tobacco  The social history was reviewed and updated today   The social history was reviewed and is unchanged  Current Meds   1  AmLODIPine Besylate 5 MG Oral Tablet; take 1 tablet by mouth daily; Therapy: 21PFH6259 to ([phone])  Requested for: 43YZO1179; Last   Rx:29Nov2017 Ordered   2  Aspirin 81 MG Oral Tablet Delayed Release; TAKE 1 TABLET DAILY; Therapy: 98Yen7935 to (Marianna Clifford)  Requested for: 74Rnq5547; Last   DU:13HDF3554 Ordered   3  Atorvastatin Calcium 10 MG Oral Tablet; take 1 tablet daily at bedtime; Therapy: 06DQI1396 to (Marianna Clifford)  Requested for: 37Til2281; Last   DV:39NES1893 Ordered   4  BD Pen Needle Short U/F 31G X 8 MM Miscellaneous; use to inject Lantus twice daily; Therapy: 61PQC9294 to (Evaluate:84Zze6093)  Requested for: 52Kuw1809; Last   Rx:21Dec2016 Ordered   5  ClonazePAM 1 MG Oral Tablet; Take 1 tablet by mouth daily as needed for anxiety or panic   attack; Therapy: 47BZO1017 to (Evaluate:54Wji1622); Last Rx:06Nov2017 Ordered   6  Dulcolax 5 MG Oral Tablet Delayed Release; take 4 tablets together  as per GI pt   handout/instructions; Therapy: 61Vjn1254 to (Last Rx:24Zsr1811)  Requested for: 04Ung7482 Ordered   7  FreeStyle Lancets Miscellaneous; may check sugar up to 3 times daily; Therapy: 87UOY0047 to (Evaluate:75Rhp1546)  Requested for: 55FOO6479; Last   Rx:25Jan2017 Ordered   8  FreeStyle Lite Device; make check blood sugars uo to 3 times daily; Therapy: 93Abu7417 to (Evaluate:25Jan2018)  Requested for: 25Jan2017; Last   Rx:25Jan2017 Ordered   9  FreeStyle Lite Test In Vitro Strip; may test blood sugar up to 3 times daily; Therapy: 83NMJ4487 to (Evaluate:72Yte9284)  Requested for: 27MCW2649; Last   Rx:25Jan2017 Ordered   10  Ginseng 250 MG Oral Capsule; Take 1 tab (200mg ginseng) three times daily; Therapy: 41BIZ0895 to Recorded   11  HumaLOG KwikPen 100 UNIT/ML Subcutaneous Solution Pen-injector; 15 units three    time daily with meals;     Therapy: 03VQF0919 to (Juan José Lambert)  Requested for: 34Abh8050; Last    EA:96JZZ7887 Ordered   12  Insulin Syringe 29G X 1/2" 0 5 ML Miscellaneous; for use with humalog 40 units twice a    day with meals; Therapy: 51RZD5165 to (Evaluate:19Jun2017)  Requested for: 39Zki3165; Last    Rx:65Jdp7061 Ordered   13  Lantus SoloStar 100 UNIT/ML Subcutaneous Solution Pen-injector; INJECT 60 UNIT    Bedtime; Therapy: 27OOA7095 to (Evaluate:85Yjy3548)  Requested for: 54FMY9341; Last    Rx:32Iiq7869 Ordered   14  Latuda 60 MG Oral Tablet; Take 1/2 tab by mouth daily with dinner meal x 1 week, then    take 1 tab daily with dinner meal;    Therapy: 59PXG5390 to (Evaluate:70Foy2322)  Requested for: 32FTF0484; Last    Rx:24Nov2017 Ordered   15  Lisinopril-Hydrochlorothiazide 20-25 MG Oral Tablet; TAKE ONE TABLET BY MOUTH    ONCE DAILY; Therapy: 29FXW8301 to (Evaluate:18Feb2018)  Requested for: 34Gsb8308; Last    Rx:95Ckz6555 Ordered   16  Omeprazole 20 MG Oral Tablet Delayed Release; Take one tablet daily; Therapy: 08HAL3774 to (Evaluate:76Krt4283)  Requested for: 59OUU6326; Last    Rx:07Nov2017 Ordered   17  Polyethylene Glycol 3350 Oral Powder; mix Miralax 238gm bottle into 64oz Gatorade -    drink the night before the colonoscopy and 1 hour after Dulcolax taken; Therapy: 52Ige1346 to (Last Rx:48Pfe5597)  Requested for: 03Ejh2766 Ordered   18  RaNITidine HCl - 150 MG Oral Tablet; TAKE 1 TABLET EVERY 12 HOURS AS NEEDED; Therapy: 80ISX0181 to (Evaluate:29Qbj3641)  Requested for: 91Ddl9161; Last    Rx:44Wrm3581 Ordered   19  Sharps Container Miscellaneous; for use with insulin needles; Therapy: 25DFR9355 to (Last Rx:80Car2338) Ordered   20  Sildenafil Citrate 20 MG Oral Tablet; Take 2 to 3 tablets by mouth 30 minutes prior to    sexual activity; Therapy: 34OBU0889 to (01 72 64 30 83)  Requested for: 43QUA6689; Last    Rx:12Osk0084 Ordered   21   Ventolin  (90 Base) MCG/ACT Inhalation Aerosol Solution; inhale 1 to 2 puffs    every 4 to 6 hours if needed; Therapy: 43Qve3489 to (Last Clarnce Porteous)  Requested for: 91Kxq3655 Ordered   22  Ziprasidone HCl - 40 MG Oral Capsule; TAKE 1 CAPSULE BY MOUTH DAILY WITH    DINNER; Therapy: 22APP7314 to (Evaluate:13Hws8723)  Requested for: 63DHR6615; Last    QX:64ZFX3393 Ordered    The medication list was reviewed and updated today  Allergies    1  TETANUS    Vitals  Signs    Temperature: 97 9 F  Heart Rate: 80  Systolic: 560  Diastolic: 84  Height: 5 ft 6 in  Weight: 320 lb 1 70 oz  BMI Calculated: 51 67  BSA Calculated: 2 44    Physical Exam    Constitutional   General appearance: No acute distress, well appearing and well nourished  appears healthy, morbidly obese and appears tired  Eyes   Pupils and irises: Abnormal   bilateral mild conjunctival injection  Ears, Nose, Mouth, and Throat   Oropharynx: Abnormal   patent but fair/ poor dentition  Neck   Neck: Abnormal   wide, short  Pulmonary   Respiratory effort: No increased work of breathing or signs of respiratory distress  Auscultation of lungs: Clear to auscultation  Cardiovascular   Auscultation of heart: Normal rate and rhythm, normal S1 and S2, no murmurs  Carotid pulses: 2+ bilaterally  Abdomen   Abdomen: Abnormal   The abdomen was obese  Bowel sounds were normal  The abdomen was soft and nontender  Lymphatic   Palpation of lymph nodes in neck: No lymphadenopathy  Musculoskeletal   Gait and station: Normal     Skin   Skin and subcutaneous tissue: Normal without rashes or lesions  Psychiatric   Judgment and insight: Normal     Orientation to person, place and time: Normal     Recent and remote memory: Intact  Mood and affect: Normal        Assessment    1  Cataract, left eye (366 9) (H26 9)   2  Morbid obesity with BMI of 50 0-59 9, adult (278 01,V85 43) (E66 01,Z68 43)   3  Type 2 diabetes mellitus with hyperglycemia (250 00) (E11 65)   4  Benign essential hypertension (401 1) (I10)   5   Bipolar affective disorder, current episode mixed, current episode severity unspecified   (296 60) (F31 60)    Plan  Type 2 diabetes mellitus with hyperglycemia    · (1) BASIC METABOLIC PROFILE; Status:Active; Requested for:24Jrv4605;    · Hemoglobin A1c- POC; Status:Active - Perform Order; Requested for:87Uvr0352;     Discussion/Summary  Surgical Clearance: Chase Tillman is not cleared from a cardiac standpoint to proceed with surgery  Comments:  Patient going for surgeon requested labs today, IF OK then will amend note to give clearance  As discussed once I get your labs from today will fax approval for cataract surgery as long as OK   Also your DM is NOT controlled and A1C went up  As has been discussed on numerous occasions eating once a day, food choices and weight are not helping and putting you at risk for heart, eye and kidney damage  I need you to check your sugar every morning and before eating dinner and taking Humalog,  sched appt with me January to review and start adjusting your insulin  Call GI back to reschedule your EGD and colonoscopy  Continue with mental health as scheduled  The patient was counseled regarding diagnostic results, instructions for management, risk factor reductions, prognosis, impressions, risks and benefits of treatment options, importance of compliance with treatment  Possible side effects of new medications were reviewed with the patient/guardian today  The treatment plan was reviewed with the patient/guardian  The patient/guardian understands and agrees with the treatment plan     Self Referrals: No      End of Encounter Meds    1  Omeprazole 20 MG Oral Tablet Delayed Release; Take one tablet daily; Therapy: 85BKX3677 to (Evaluate:28Qyk7967)  Requested for: 09NEV5791; Last   Rx:07Nov2017 Ordered   2  RaNITidine HCl - 150 MG Oral Tablet; TAKE 1 TABLET EVERY 12 HOURS AS NEEDED; Therapy: 08NCG5838 to (Evaluate:35Ixk3146)  Requested for: 33Sol2727; Last   Rx:84Jgj1828 Ordered    3  AmLODIPine Besylate 5 MG Oral Tablet; take 1 tablet by mouth daily; Therapy: 00RHH0629 to (Evaluate:77Iza3603)  Requested for: 86JPA5120; Last   Rx:29Nov2017 Ordered   4  Lisinopril-Hydrochlorothiazide 20-25 MG Oral Tablet; TAKE ONE TABLET BY MOUTH   ONCE DAILY; Therapy: 01ZNF1223 to (Evaluate:21Noi1643)  Requested for: 36Csv4191; Last   Rx:22Aug2017 Ordered    5  ClonazePAM 1 MG Oral Tablet; Take 1 tablet by mouth daily as needed for anxiety or panic   attack; Therapy: 19KXS9744 to (Evaluate:27Vsp7201); Last Rx:06Nov2017 Ordered   6  Latuda 60 MG Oral Tablet; Take 1/2 tab by mouth daily with dinner meal x 1 week, then   take 1 tab daily with dinner meal;   Therapy: 58ZNR8573 to (Evaluate:62Teb7117)  Requested for: 67JRD6471; Last   Rx:24Nov2017 Ordered   7  Ziprasidone HCl - 40 MG Oral Capsule; TAKE 1 CAPSULE BY MOUTH DAILY WITH   DINNER; Therapy: 10UCS2305 to (Evaluate:96Ijx8380)  Requested for: 65BZF9803; Last   Rx:06Nov2017 Ordered    8  Ginseng 250 MG Oral Capsule; Take 1 tab (200mg ginseng) three times daily; Therapy: 45Jcf9153 to Recorded   9  Sildenafil Citrate 20 MG Oral Tablet; Take 2 to 3 tablets by mouth 30 minutes prior to   sexual activity; Therapy: 07USZ9735 to (Mayme Peer)  Requested for: 81BYF0054; Last   Rx:28Sep2017 Ordered    10  Ventolin  (90 Base) MCG/ACT Inhalation Aerosol Solution; inhale 1 to 2 puffs    every 4 to 6 hours if needed; Therapy: 76Bvu6361 to (Last Damian Mortimer)  Requested for: 60Vfx8153 Ordered    11  FreeStyle Lancets Miscellaneous; may check sugar up to 3 times daily; Therapy: 25FOK7141 to (Evaluate:79Lji8283)  Requested for: 60XDK8507; Last    Rx:25Jan2017 Ordered   12  FreeStyle Lite Test In Vitro Strip; may test blood sugar up to 3 times daily; Therapy: 27RYQ9712 to (Evaluate:05Cfl7893)  Requested for: 74IDA7056; Last    Rx:25Jan2017 Ordered   13   Insulin Syringe 29G X 1/2" 0 5 ML Miscellaneous; for use with humalog 40 units twice a day with meals; Therapy: 40LWX4023 to (Evaluate:19Jun2017)  Requested for: 07Djh0041; Last    Rx:99Bbz9645 Ordered   14  Sharps Container Miscellaneous; for use with insulin needles; Therapy: 46JVA2920 to (Last Rx:88Nea3371) Ordered    15  Dulcolax 5 MG Oral Tablet Delayed Release; take 4 tablets together  as per GI pt    handout/instructions; Therapy: 72Out4580 to (Last Rx:18Sep2017)  Requested for: 12Zny3669 Ordered   16  Polyethylene Glycol 3350 Oral Powder; mix Miralax 238gm bottle into 64oz Gatorade -    drink the night before the colonoscopy and 1 hour after Dulcolax taken; Therapy: 86Isj6878 to (Last Rx:18Sep2017)  Requested for: 35Jbn6552 Ordered    17  Aspirin 81 MG Oral Tablet Delayed Release; TAKE 1 TABLET DAILY; Therapy: 24Zqq7551 to (Bo Escobar)  Requested for: 48Dcf9199; Last    Rx:07Sep2017 Ordered   18  Atorvastatin Calcium 10 MG Oral Tablet; take 1 tablet daily at bedtime; Therapy: 66VYQ9514 to (Bo Escobar)  Requested for: 50Pvd2522; Last    Rx:94Kiw5955 Ordered   19  BD Pen Needle Short U/F 31G X 8 MM Miscellaneous; use to inject Lantus twice daily; Therapy: 38FZF1923 to (Evaluate:17Sep2017)  Requested for: 84Qbr9922; Last    Rx:28Bpo4131 Ordered   20  FreeStyle Lite Device; make check blood sugars uo to 3 times daily; Therapy: 76Chr9518 to (Evaluate:25Jan2018)  Requested for: 61VVY0588; Last    Rx:25Jan2017 Ordered   21  HumaLOG KwikPen 100 UNIT/ML Subcutaneous Solution Pen-injector; 15 units three    time daily with meals; Therapy: 64VXA4819 to (Sandra Dopp)  Requested for: 73Gdz6814; Last    Rx:07Sep2017 Ordered   22  Lantus SoloStar 100 UNIT/ML Subcutaneous Solution Pen-injector; INJECT 60 UNIT    Bedtime;     Therapy: 99GDT6061 to (Francy Mill)  Requested for: 73WBK8666; Last    Rx:28Sep2017 Ordered    Signatures   Electronically signed by : Meeta Chatterjee [facility]; Dec 18 2017 12:42PM EST                       (Author)    Electronically signed by : John Salgado DO; Dec 18 2017 12:55PM EST                       (Review)
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
## 2018-02-06 LAB
LEFT EYE DIABETIC RETINOPATHY: NORMAL
RIGHT EYE DIABETIC RETINOPATHY: NORMAL

## 2018-02-13 NOTE — PSYCH
Message   Recorded as Task   Date: 11/10/2017 01:56 PM, Created By: Melisa Bray   Task Name: Call Back   Assigned To: Marcelina Market   Regarding Patient: [address], Status: Active   Comment:    Kezia Argueta - 10 Nov 2017 1:56 PM     TASK CREATED  Caller: Self; Results Inquiry; [phone] (Home)  Patient is returning your phone call about his blood work, says he uis worried sick and would like a call back Nimo Anne - 10 Nov 2017 4:13 PM     TASK EDITED  I spoke with Ranulfo Petty  Reviewed elevated glucose and TSH  Instructed pt to f/u with PCP  He should call our office if PCP does not have access to lab results  Pt verbalized understanding of same  Thanks   Message Free Text Note Form: I had attempted to contact Pt yesterday evening but had to leave a msg on Ranulfo Petty' voice mail  I spoke with Natasha Lyman today regarding this and she made a return call to him today to convey results  Active Problems    1  Acid reflux (530 81) (K21 9)   2  Acute bronchitis (466 0) (J20 9)   3  Allergic rhinitis (477 9) (J30 9)   4  Benign essential hypertension (401 1) (I10)   5  Bipolar affective disorder, current episode mixed, current episode severity unspecified   (296 60) (F31 60)   6  Borderline hyperlipidemia (272 4) (E78 5)   7  Diabetic polyneuropathy (250 60,357 2) (E11 42)   8  Equinus deformity of foot (736 79) (M21 6X9)   9  Erectile dysfunction (607 84) (N52 9)   10  Gasping for breath (786 09) (R06 89)   11  Microalbuminuria (791 0) (R80 9)   12  Morbid obesity with BMI of 50 0-59 9, adult (278 01,V85 43) (E66 01,Z68 43)   13  Need for pneumococcal vaccination (V03 82) (Z23)   14  Need for prophylactic vaccination and inoculation against influenza (V04 81) (Z23)   15  Obesity (278 00) (E66 9)   16  Other anxiety states (300 09) (F41 1)   17  Panic attacks (300 01) (F41 0)   18  Pes planus of both feet (734) (M21 41,M21 42)   19  Screening for colon cancer (V76 51) (Z12 11)   20   Snoring (786 09) (R06 83)   21  Type 2 diabetes mellitus with hyperglycemia (250 00) (E11 65)   22  Xerosis of skin (706 8) (L85 3)    Current Meds   1  AmLODIPine Besylate 5 MG Oral Tablet; TAKE 1 TABLET DAILY; Therapy: 90OVR9043 to (Evaluate:66Gvm0933)  Requested for: 44Bpx8517; Last   Rx:55Anb8467 Ordered   2  Aspirin 81 MG Oral Tablet Delayed Release; TAKE 1 TABLET DAILY; Therapy: 98Dxp2565 to (Cristina Julia)  Requested for: 76Vmz0700; Last   LB:60QQN1362 Ordered   3  Atorvastatin Calcium 10 MG Oral Tablet; take 1 tablet daily at bedtime; Therapy: 63WHH2197 to (Cristina Julia)  Requested for: 04Wto5394; Last   AL:51OTP6697 Ordered   4  BD Pen Needle Short U/F 31G X 8 MM Miscellaneous; use to inject Lantus twice daily; Therapy: 44TYL0531 to (Evaluate:54Qtw7310)  Requested for: 04Nmu0250; Last   Rx:89Qki2113 Ordered   5  Benzonatate 100 MG Oral Capsule (Tessalon Perles); TAKE 1 CAPSULE 3 TIMES DAILY   AS NEEDED; Therapy: 92Xsy8247 to (Evaluate:08Xlh2818)  Requested for: 04Jml5301; Last   Rx:93Fxp9316 Ordered   6  ClonazePAM 1 MG Oral Tablet; Take 1 tablet by mouth daily as needed for anxiety or panic   attack; Therapy: 54IEU8224 to (Evaluate:80Qfx1044); Last Rx:06Nov2017 Ordered   7  Dulcolax 5 MG Oral Tablet Delayed Release; take 4 tablets together  as per GI pt   handout/instructions; Therapy: 51Sry7373 to (Last Rx:70Lpr4669)  Requested for: 81Yth6380 Ordered   8  FreeStyle Lancets Miscellaneous; may check sugar up to 3 times daily; Therapy: 01ZON7029 to (Evaluate:28Awv7161)  Requested for: 19CZW6599; Last   Rx:25Jan2017 Ordered   9  FreeStyle Lite Device; make check blood sugars uo to 3 times daily; Therapy: 11Pxz7999 to (Evaluate:25Jan2018)  Requested for: 25Jan2017; Last   Rx:25Jan2017 Ordered   10  FreeStyle Lite Test In Vitro Strip; may test blood sugar up to 3 times daily; Therapy: 29NGI5337 to (Evaluate:13Lih9833)  Requested for: 27ZXX3947; Last    Rx:25Jan2017 Ordered   11   Ginseng 250 MG Oral Capsule; Take 1 tab (200mg ginseng) three times daily; Therapy: 68RXT8936 to Recorded   12  HumaLOG KwikPen 100 UNIT/ML Subcutaneous Solution Pen-injector; 15 units three    time daily with meals; Therapy: 79IZT7214 to (Liz Hutton)  Requested for: 70Wpu2110; Last    Rx:40Rhu4715 Ordered   13  Insulin Syringe 29G X 1/2" 0 5 ML Miscellaneous; for use with humalog 40 units twice a    day with meals; Therapy: 81OWM4771 to (Evaluate:19Jun2017)  Requested for: 38Txs7457; Last    Rx:11Etu1057 Ordered   14  Lantus SoloStar 100 UNIT/ML Subcutaneous Solution Pen-injector; INJECT 60 UNIT    Bedtime; Therapy: 12CXB3286 to (Evaluate:45Cop4066)  Requested for: 42PRR7051; Last    Rx:65Iqc1931 Ordered   15  Lisinopril-Hydrochlorothiazide 20-25 MG Oral Tablet; TAKE ONE TABLET BY MOUTH    ONCE DAILY; Therapy: 96EKW2393 to (Evaluate:33Ahh6952)  Requested for: 66Xtq1200; Last    Rx:90Qsp6300 Ordered   16  Omeprazole 20 MG Oral Tablet Delayed Release; Take one tablet daily; Therapy: 44JSS6317 to (Evaluate:94Zsc6230)  Requested for: 36DJP7274; Last    Rx:07Nov2017 Ordered   17  Polyethylene Glycol 3350 Oral Powder; mix Miralax 238gm bottle into 64oz Gatorade -    drink the night before the colonoscopy and 1 hour after Dulcolax taken; Therapy: 66Acm8293 to (Last Rx:11Bgs6781)  Requested for: 98Hgw0232 Ordered   18  RaNITidine HCl - 150 MG Oral Tablet; TAKE 1 TABLET EVERY 12 HOURS AS NEEDED; Therapy: 81SBR4100 to (Evaluate:70Uba6276)  Requested for: 09Iob2371; Last    Rx:18Qfs1336 Ordered   19  Sharps Container Miscellaneous; for use with insulin needles; Therapy: 80WTU8335 to (Last Rx:53Srw3705) Ordered   20  Sildenafil Citrate 20 MG Oral Tablet; Take 2 to 3 tablets by mouth 30 minutes prior to    sexual activity; Therapy: 28IBF7345 to (Davy Gaming)  Requested for: 12GLK9270; Last    Rx:58Svw3501 Ordered   21   Ventolin  (90 Base) MCG/ACT Inhalation Aerosol Solution; inhale 1 to 2 puffs    every 4 to 6 hours if needed; Therapy: 38Byc8107 to (Last Ck Bell)  Requested for: 48Jei8262 Ordered   22  Ziprasidone HCl - 40 MG Oral Capsule; TAKE 1 CAPSULE BY MOUTH DAILY WITH    DINNER;     Therapy: 26YPI4818 to (Evaluate:40Vzd5333)  Requested for: 78TIJ5164; Last    KA:71YGX1150 Ordered    Allergies    1  TETANUS    Signatures   Electronically signed by : MARILYN Mcclain; Nov 10 2017  4:37PM EST                       (Author)

## 2018-02-16 ENCOUNTER — OFFICE VISIT (OUTPATIENT)
Dept: PSYCHIATRY | Facility: CLINIC | Age: 53
End: 2018-02-16
Payer: MEDICARE

## 2018-02-16 VITALS — WEIGHT: 309 LBS | HEIGHT: 66 IN | BODY MASS INDEX: 49.66 KG/M2

## 2018-02-16 DIAGNOSIS — F41.1 ANXIETY STATE: Primary | ICD-10-CM

## 2018-02-16 DIAGNOSIS — F41.0 PANIC ATTACKS: ICD-10-CM

## 2018-02-16 DIAGNOSIS — F31.60 BIPOLAR AFFECTIVE DISORDER, CURRENT EPISODE MIXED, CURRENT EPISODE SEVERITY UNSPECIFIED (HCC): ICD-10-CM

## 2018-02-16 PROCEDURE — 99213 OFFICE O/P EST LOW 20 MIN: CPT | Performed by: PHYSICIAN ASSISTANT

## 2018-02-16 RX ORDER — CLONAZEPAM 1 MG/1
1 TABLET ORAL DAILY PRN
Qty: 30 TABLET | Refills: 1 | Status: SHIPPED | OUTPATIENT
Start: 2018-02-16 | End: 2018-04-16 | Stop reason: SDUPTHER

## 2018-02-16 RX ORDER — CLONAZEPAM 1 MG/1
TABLET ORAL
COMMUNITY
Start: 2017-11-06 | End: 2018-02-16 | Stop reason: SDUPTHER
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
## 2018-02-19 DIAGNOSIS — I10 ESSENTIAL HYPERTENSION: Primary | ICD-10-CM

## 2018-02-19 RX ORDER — LISINOPRIL 20 MG/1
20 TABLET ORAL DAILY
Qty: 90 TABLET | Refills: 1 | Status: SHIPPED | OUTPATIENT
Start: 2018-02-19 | End: 2018-05-14 | Stop reason: ALTCHOICE

## 2018-03-06 ENCOUNTER — OFFICE VISIT (OUTPATIENT)
Dept: BEHAVIORAL/MENTAL HEALTH CLINIC | Facility: CLINIC | Age: 53
End: 2018-03-06
Payer: MEDICARE

## 2018-03-06 DIAGNOSIS — F31.60 BIPOLAR AFFECTIVE DISORDER, CURRENT EPISODE MIXED, CURRENT EPISODE SEVERITY UNSPECIFIED (HCC): Primary | ICD-10-CM

## 2018-03-06 DIAGNOSIS — F41.0 PANIC ATTACKS: ICD-10-CM

## 2018-03-06 PROCEDURE — 90834 PSYTX W PT 45 MINUTES: CPT | Performed by: SOCIAL WORKER
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
## 2018-03-06 NOTE — PSYCH
Treatment Plan Tracking    # The initialTreatment Plan not completed within required time limits due to: the fact there was such a space between the assessment and today the first session he wanted to talk  he agreed to do it next session  Baylee Marino

## 2018-03-07 NOTE — PROGRESS NOTES
Chief Complaint  Chief Complaint Free Text Note Form: Patient presents today for 1 month Type 2 Diabetes Mellitus follow-up  History of Present Illness  HPI: DP is a 47 y o  male who presents today for a follow-up for his diabetes  He is currently taking his diabetes medication differently than prescribed, as he is feels he takes it in a way that is the best for him  He also does not test his blood sugars regularly and reports taking them twice in the past month  Currently DP is on Lantus 60 units HS, but he has been taking this twice daily  He is also on a regimented Humalog dose of 15 units TID, but he takes this mediation once daily with dinner  His most recent A1c was taken on 8/22/17 and was 8 1%, coming down from 11 3% taken 12/21/16  When asked how the patient was feeling he expressed that he has been having a lot of episodes of GERD, where he feels burning in his esophagus a couple of hours after he eats  He is prescribed ranitidine 150mg capsules, but states that he does not feel any relief from this medication  He has been managing his GERD by taking omeprazole daily and also reports using about 4 dayanna seltzer tabes a day  DP was informed that he should cut back on his usage of the dayanna seltzer, due to the increased risk of getting a gastric ulcer  In the last visit he expressed concern about his erectile dysfunction, so he was prescribed sildenafil 20mg  He reports that this does not work, but he has been using ArginMax says this is increasing his libido  He has stopped taking the sildenafil and is using 2 tablets of ArginMax, which is successfully working for his ED  The patients last concern was about his insurance  He would like coverage to decrease his insulin cost  He is seeing Thomas Kerns in the next two months to find a plan that works best for him  This new plan should include coverage of Humulin U500, as this may be a therapy option for Mr Newton Sever in the future  Active Problems    1   Acid reflux (530 81) (K21 9)   2  Acute bronchitis (466 0) (J20 9)   3  Allergic rhinitis (477 9) (J30 9)   4  Benign essential hypertension (401 1) (I10)   5  Bipolar disease, chronic (296 80) (F31 9)   6  Borderline hyperlipidemia (272 4) (E78 5)   7  Diabetic polyneuropathy (250 60,357 2) (E11 42)   8  Equinus deformity of foot (736 79) (M21 6X9)   9  Erectile dysfunction (607 84) (N52 9)   10  Gasping for breath (786 09) (R06 89)   11  Microalbuminuria (791 0) (R80 9)   12  Morbid obesity with BMI of 50 0-59 9, adult (278 01,V85 43) (E66 01,Z68 43)   13  Need for pneumococcal vaccination (V03 82) (Z23)   14  Need for prophylactic vaccination and inoculation against influenza (V04 81) (Z23)   15  Obesity (278 00) (E66 9)   16  Pes planus of both feet (734) (M21 41,M21 42)   17  Screening for colon cancer (V76 51) (Z12 11)   18  Snoring (786 09) (R06 83)   19  Type 2 diabetes mellitus with hyperglycemia (250 00) (E11 65)   20  Xerosis of skin (706 8) (L85 3)    Social History    · Chews tobacco (305 1) (Z72 0)   · Current Smokeless Tobacco User   · Never a smoker   · History of Never A Smoker   · Tobacco use    Past Medical History    1  History of Brittle nails (703 8) (L60 3)   2  History of Chest congestion (786 9) (R09 89)   3  History of Chewing difficulty (783 3) (R63 3)   4  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) (F06 31)   5  History of Dyspepsia (536 8) (K30)   6  History of athlete's foot (V12 09) (Z86 19)   7  History of peripheral neuropathy (V12 49) (Z86 69)   8  History of type 2 diabetes mellitus (V12 29) (Z86 39)   9  History of upper respiratory infection (V12 09) (Z87 09)   10  History of Upper respiratory infection with cough and congestion (465 9) (J06 9)    Current Meds   1  AmLODIPine Besylate 5 MG Oral Tablet; TAKE 1 TABLET DAILY; Therapy: 96QDG9947 to (Evaluate:85Fqh7619)  Requested for: 61Zix1510; Last   Rx:47Bjv8332 Ordered   2  Aspirin 81 MG Oral Tablet Delayed Release; TAKE 1 TABLET DAILY; Therapy: 51Mrd4282 to (Rashaad Sierra)  Requested for: 24Bpf6687; Last   EA:56MGP8087 Ordered   3  Atorvastatin Calcium 10 MG Oral Tablet; take 1 tablet daily at bedtime; Therapy: 88VCJ2245 to (Rashaad Sierra)  Requested for: 09Ikl5796; Last   JL:80VQH0521 Ordered   4  BD Pen Needle Short U/F 31G X 8 MM Miscellaneous; use to inject Lantus twice daily; Therapy: 64DLA0994 to (Evaluate:97Nuq9253)  Requested for: 95Fwz1615; Last   Rx:61Coe7581 Ordered   5  Benzonatate 100 MG Oral Capsule; TAKE 1 CAPSULE 3 TIMES DAILY AS NEEDED; Therapy: 44Zof0009 to (Evaluate:01Sep2017)  Requested for: 24Mya3821; Last   Rx:20Fcy9889 Ordered   6  Dulcolax 5 MG Oral Tablet Delayed Release; take 4 tablets together  as per GI pt   handout/instructions; Therapy: 41Cjg8037 to (Last Rx:88Pbw9685)  Requested for: 20Cht5497 Ordered   7  FreeStyle Lancets Miscellaneous; may check sugar up to 3 times daily; Therapy: 87SZP1782 to (Evaluate:79Udf0869)  Requested for: 60QSA5525; Last   Rx:25Jan2017 Ordered   8  FreeStyle Lite Device; make check blood sugars uo to 3 times daily; Therapy: 05Ofh8511 to (Evaluate:25Jan2018)  Requested for: 25Jan2017; Last   Rx:25Jan2017 Ordered   9  FreeStyle Lite Test In Vitro Strip; may test blood sugar up to 3 times daily; Therapy: 63VUL7248 to (Evaluate:19Jeh8873)  Requested for: 63PHR3935; Last   Rx:25Jan2017 Ordered   10  Ginseng 250 MG Oral Capsule; Take 1 tab (200mg ginseng) three times daily; Therapy: 79ISK7885 to Recorded   11  HumaLOG KwikPen 100 UNIT/ML Subcutaneous Solution Pen-injector; 15 units three    time daily with meals; Therapy: 11EMV9533 to (Marilu Fitzgerald)  Requested for: 74Bdn6250; Last    Rx:07Sep2017 Ordered   12  Insulin Syringe 29G X 1/2" 0 5 ML Miscellaneous; for use with humalog 40 units twice a    day with meals;     Therapy: 33UZY3034 to (Evaluate:19Jun2017)  Requested for: 78Okq5448; Last Rx:01Flc0711 Ordered   13  Lantus SoloStar 100 UNIT/ML Subcutaneous Solution Pen-injector; INJECT 60 UNIT    Bedtime; Therapy: 44RMG9123 to (Evaluate:17Yhe4543)  Requested for: 73MKO7148; Last    Rx:39Psm2255 Ordered   14  Lisinopril-Hydrochlorothiazide 20-25 MG Oral Tablet; TAKE ONE TABLET BY MOUTH    ONCE DAILY; Therapy: 93TXH8629 to (Evaluate:67Pop6826)  Requested for: 14Zjh7120; Last    Rx:79Feg5156 Ordered   15  Polyethylene Glycol 3350 Oral Powder; mix Miralax 238gm bottle into 64oz Gatorade -    drink the night before the colonoscopy and 1 hour after Dulcolax taken; Therapy: 17Chj1748 to (Last Rx:58Wxx6050)  Requested for: 16Tnw7297 Ordered   16  RaNITidine HCl - 150 MG Oral Tablet; TAKE 1 TABLET EVERY 12 HOURS AS NEEDED; Therapy: 70AYD1439 to (Evaluate:94Fbt2360)  Requested for: 83Hxk5583; Last    Rx:87Zxe9791 Ordered   17  Sharps Container Miscellaneous; for use with insulin needles; Therapy: 58POF3801 to (Last Rx:30Clm0352) Ordered   18  Sildenafil Citrate 20 MG Oral Tablet; Take 2 to 3 tablets by mouth 30 minutes prior to    sexual activity; Therapy: 66YQS1325 to (05 12 73 93 30)  Requested for: 03DJM7592; Last    Rx:90Cvb9496 Ordered   19  Ventolin  (90 Base) MCG/ACT Inhalation Aerosol Solution; inhale 1 to 2 puffs    every 4 to 6 hours if needed; Therapy: 86Mhn8938 to (Last Rx:54Jkj6199)  Requested for: 87Mnp4768 Ordered    Allergies    1  TETANUS    Assessment    1  Type 2 diabetes mellitus with hyperglycemia (250 00) (E11 65)   2  Acid reflux (530 81) (K21 9)   3  Erectile dysfunction (607 84) (N52 9)    1  T2DM  The patient is currently taking his insulin differently than prescribed  Since he has been doing this he has had a significant decrease in his A1c and he has also been compliant with his self created regimen  Due to his history of non-compliance and his lack of testing blood sugar readings at home the only way to gauge his therapy would be by checking his A1c  Also, it is important that he continues his regimen exactly, so we can evaluate his therapy  2 Acid reflux  Ranitidine is not working  He is taking excessive amounts of dayanna seltzer, which may cause harm  Also, he is self managing with omeprazole  On December 8th he has an EGD scheduled  3  ED  Sildenafil is not working but ArginMax is  Plan  1  T2DM  Recommend the patient continues taking his Lantus 60 units BID and his 15 units of Humalog once daily with dinner  2  Acid reflux  Recommend the patient discontinue ranitidine  Recommend the patient starts omeprazole, for the six weeks up until his EGD on 12/8/17, then re-evaluate after impression  3 ED  Recommend the patient discontinues sildenafil  Recommend the patient continues taking the 2 tablets of ArginMax for libido  Future Appointments    Date/Time Provider Specialty Site   11/06/2017 10:00 AM Jessica Littlejohn RPA-ZIGGY Psychiatry [facility]   11/07/2017 11:15 AM Specialty Clinic,   [address] PCP   11/22/2017 02:00 PM Jose Robles LCSW, ACSW  [facility]   12/08/2017 08:00 AM Ana Sullivan MD Gastroenterology Adult [facility] OUTPATIENT     Attending Note  I have met with the patient and reviewed his medical chart  He is nonadherent in that he uses his medicines as he sees fit as opposed to as prescribed  This is not new for him and he is not willing to change  He is getting closer to his A1C target without evidence of harm  Discussed that if he is not going to test his BG at home, then he must remain on a stable regimen of insulin between A1Cs so we can assess its efficacy  He agreed  Unsuccessful attempt with sildenafil, but the ginseng extract from Arginmax is showing benefit at a much lower than expected dose  However, it is having the desired effect without adverse event, for now  Will continue to follow as scheduled       Signatures   Electronically signed by : Mary Bartlett, Pharm  D ; Oct 31 2017  3:13PM EST                       (Author)    Electronically signed by : ROCHELLE Tucker; Nov 2 2017 10:34AM EST                       (Author)    Electronically signed by : Alis Roca DO; Nov 2 2017  7:14PM EST                       (Review)

## 2018-03-07 NOTE — PROGRESS NOTES
Chief Complaint  Chief Complaint Free Text Note Form: Follow up diabetes visit, last visit with pharmacy was 1/10/17      History of Present Illness  HPI: Mr Nj Tierney presents to clinic today, without his SMBG log  When asked how his sugars generally run, he states that they're fine, he never experiences symptoms of hyperglycemia, and he experienced one low in the past year, which he appropriately corrected  To reinforce how to properly treat hypoglycemia, Mr Nj Tierney was educated on the 15:15 rule  His A1c as of 12/21/19 was 11 3, but on 8/22/17 his A1c dropped to 8 1  This A1c drop is a great improvement, and the patient is within goal range  He notes that he no longer suffers from neuropathy and had D/C'd his gabapentin  His current diabetes regimen is Lantus 50 U at bedtime, and Humalog 15 U with meals  He seems to have reached his previous goal of getting his diabetes under control and his happy with where he is at  Mr Nj Tierney reports that he is still experiencing issues getting an erection, and achieving an orgasm  A prescription for Viagra was sent to the pharmacy at the last visit, although he cannot fill this prescription due to the price  He was curious if there were other alternatives, possibly over the counter options, that he could try  Active Problems    1  Acid reflux (530 81) (K21 9)   2  Acute bronchitis (466 0) (J20 9)   3  Allergic rhinitis (477 9) (J30 9)   4  Benign essential hypertension (401 1) (I10)   5  Bipolar disease, chronic (296 80) (F31 9)   6  Borderline hyperlipidemia (272 4) (E78 5)   7  Diabetic polyneuropathy (250 60,357 2) (E11 42)   8  Equinus deformity of foot (736 79) (M21 6X9)   9  Erectile dysfunction (607 84) (N52 9)   10  Gasping for breath (786 09) (R06 89)   11  Microalbuminuria (791 0) (R80 9)   12  Morbid obesity with BMI of 50 0-59 9, adult (278 01,V85 43) (E66 01,Z68 43)   13  Need for pneumococcal vaccination (V03 82) (Z23)   14   Need for prophylactic vaccination and inoculation against influenza (V04 81) (Z23)   15  Obesity (278 00) (E66 9)   16  Pes planus of both feet (734) (M21 41,M21 42)   17  Screening for colon cancer (V76 51) (Z12 11)   18  Snoring (786 09) (R06 83)   19  Type 2 diabetes mellitus with hyperglycemia (250 00) (E11 65)   20  Xerosis of skin (706 8) (L85 3)    Social History    · Chews tobacco (305 1) (Z72 0)   · Current Smokeless Tobacco User   · Never a smoker   · History of Never A Smoker   · Tobacco use    Past Medical History    1  History of Brittle nails (703 8) (L60 3)   2  History of Chest congestion (786 9) (R09 89)   3  History of Chewing difficulty (783 3) (R63 3)   4  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) (F06 31)   5  History of Dyspepsia (536 8) (K30)   6  History of athlete's foot (V12 09) (Z86 19)   7  History of peripheral neuropathy (V12 49) (Z86 69)   8  History of type 2 diabetes mellitus (V12 29) (Z86 39)   9  History of upper respiratory infection (V12 09) (Z87 09)   10  History of Upper respiratory infection with cough and congestion (465 9) (J06 9)    Current Meds   1  AmLODIPine Besylate 5 MG Oral Tablet; TAKE 1 TABLET DAILY; Therapy: 57JAM6589 to (Evaluate:28Ays7802)  Requested for: 45Hvp0621; Last   Rx:31Ile2352 Ordered   2  Aspirin 81 MG Oral Tablet Delayed Release; TAKE 1 TABLET DAILY; Therapy: 78Gsx2020 to (Evaluate:95Xjl2624)  Requested for: 54Gwt8174; Last   Rx:55Wfl5674 Ordered   3  Atorvastatin Calcium 10 MG Oral Tablet; take 1 tablet daily at bedtime; Therapy: 75TSO7234 to (Evaluate:32Yet1725)  Requested for: 41Xaq3974; Last   Rx:48Omn2584 Ordered   4  BD Pen Needle Short U/F 31G X 8 MM Miscellaneous; use to inject Lantus twice daily; Therapy: 19ZLN0170 to (Evaluate:49Llg9962)  Requested for: 89Ebk4699; Last   Rx:00Htn0021 Ordered   5  Benzonatate 100 MG Oral Capsule; TAKE 1 CAPSULE 3 TIMES DAILY AS NEEDED;    Therapy: 55Wmd6303 to Naida Maddox)  Requested for: 60Aci7278; Last   Rx:22Aug2017 Ordered   6  FreeStyle Lancets Miscellaneous; may check sugar up to 3 times daily; Therapy: 90OTS2921 to (Evaluate:24Jul2017)  Requested for: 60TSB2612; Last   Rx:25Jan2017 Ordered   7  FreeStyle Lite Device; make check blood sugars uo to 3 times daily; Therapy: 95Ggd0840 to (Evaluate:25Jan2018)  Requested for: 25Jan2017; Last   Rx:25Jan2017 Ordered   8  FreeStyle Lite Test In Vitro Strip; may test blood sugar up to 3 times daily; Therapy: 67MIW2162 to (Evaluate:24Jul2017)  Requested for: 93MHN9514; Last   Rx:25Jan2017 Ordered   9  Ginseng 250 MG Oral Capsule; Take 1 tab (200mg ginseng) three times daily; Therapy: 09PDB0392 to Recorded   10  HumaLOG KwikPen 100 UNIT/ML Subcutaneous Solution Pen-injector; 10 units three    time daily with meals; Therapy: 84CKF7342 to (Renew:09Jul2017)  Requested for: 77KSF6185; Last    Rx:10Jan2017 Ordered   11  Insulin Syringe 29G X 1/2" 0 5 ML Miscellaneous; for use with humalog 40 units twice a    day with meals; Therapy: 93AEX4560 to (Evaluate:19Jun2017)  Requested for: 93Hwu3040; Last    Rx:21Dec2016 Ordered   12  Lantus SoloStar 100 UNIT/ML Subcutaneous Solution Pen-injector; inject 40 units every    morning and 40 units at bedtime; Therapy: 95IRL8968 to (Evaluate:53Vfd5175)  Requested for: 16FAD2517; Last    Rx:10Jan2017 Ordered   13  Lisinopril-Hydrochlorothiazide 20-25 MG Oral Tablet; TAKE ONE TABLET BY MOUTH    ONCE DAILY; Therapy: 97MFJ0877 to (Evaluate:18Feb2018)  Requested for: 23Nxg9584; Last    Rx:22Aug2017 Ordered   14  RaNITidine HCl - 150 MG Oral Tablet; TAKE 1 TABLET EVERY 12 HOURS AS NEEDED; Therapy: 14CDV7898 to (Evaluate:79Nxh6411)  Requested for: 45Chp4699; Last    Rx:85Jza7168 Ordered   15  Sharps Container Miscellaneous; for use with insulin needles; Therapy: 16COI5776 to (Last Rx:73Bqx5627) Ordered   16   Ventolin  (90 Base) MCG/ACT Inhalation Aerosol Solution; inhale 1 to 2 puffs    every 4 to 6 hours if needed; Therapy: 35Lxw0019 to (Last Rx:17Bfw4737)  Requested for: 71Cwz6325 Ordered    Allergies    1  TETANUS    Vitals  Signs   Recorded: 26XKJ7139 30:94HB   Systolic: 554, LUE, Sitting  Diastolic: 78, LUE, Sitting    Assessment    1  Type 2 diabetes mellitus with hyperglycemia (250 00) (E11 65)   2  Erectile dysfunction (607 84) (N52 9)    1  Type 2 Diabetes: Controlled on current regimen  Encouraged patient to keep doing what he has been doing and taking insulin at current doses  Explained importance of preventative medications such as atorvastatin, and aspirin 81mg for cardiovascular health with diabetes and he was willing to restart these medications  2  Erectile Dysfunction: Most likely all prescriptions options will be costly, over the counter panax ginseng may be helpful  Plan    1  Aspirin 81 MG Oral Tablet Delayed Release; TAKE 1 TABLET DAILY   2  Atorvastatin Calcium 10 MG Oral Tablet; take 1 tablet daily at bedtime   3  HumaLOG KwikPen 100 UNIT/ML Subcutaneous Solution Pen-injector; 15 units   three time daily with meals   4  Lantus SoloStar 100 UNIT/ML Subcutaneous Solution Pen-injector; Inject 50   units at bedtime    1  Restart Atorvastatin 10mg 1 tab HS  2  Restart ASA 81mg 1 tab daily  3  Continue Lantus 50 U HS  4  Continue Humalog 15 U with meals  5  Start Panax Ginseng 200mg cap TID  6  F/U visit in 1 month to assess how ginseng has been working as well as diabetes control     Future Appointments    Date/Time Provider Specialty Site   09/28/2017 09:00 AM ROCHELLE Cheema Pharmacist [address] [place] PCP     Attending Note  I have met with the patient and reviewed his medical chart  His diabetes is much improved  His goal should be closer to 7 (vs  8 1%), but will wait until next A1C to make additional changes to see if 8 1% is his steady state ois he was still decreasing   As for his ED, he was not willing to pay for traditional PDE5  Discussed ginseng with him, the risks and potential benefits  Noted that ADRs tend to present themself when taking greater than 1 7 grams per day, so started him with ~50% of that dose and to monitor for an effect  If ineffective, may need to use other means such as PDE5 (sildenafil 20 mg  Anaid Bailey Revatio   take 2 to 3 tablets as needed for intercourse)       Signatures   Electronically signed by : Jordi Rojas; Sep 12 2017 10:03AM EST                       (Author)    Electronically signed by : DARA Barrow ; Sep 12 2017 10:06AM EST                       (Author)

## 2018-03-07 NOTE — PROGRESS NOTES
Chief Complaint  Chief Complaint Free Text Note Form: Follow-up re: type 2 diabetes      History of Present Illness  HPI: The patient has been without medicines for the last 3-4 months because he lost insurance coverage  His glucoses have been high and his last A1C was 11%  However, this is unchanged since his last A1C when he was using insulin  Says that his glucoses did not change because when he was without insulin, he changed his eating to eliminate pasta, rices, and sweets  He is willing to continue these diet changes  States he is committed to getting his diabetes under better control  Reports having difficulty with achieving an erection and a low sexual desire  Notes less muscle tone lack of body hair, and fatigue  He requested to have his testosterone checked  I recommended he have a conversation with his PCP before pursuing this diagnosis as his weight may be a major contributor to his symptoms and feelings  In the interim, he requested a prescription for sildenafil  Active Problems    1  Allergic rhinitis (477 9) (J30 9)   2  Benign essential hypertension (401 1) (I10)   3  Bipolar disease, chronic (296 80) (F31 9)   4  Diabetic polyneuropathy (250 60,357 2) (E11 42)   5  Equinus deformity of foot (736 79) (M21 6X9)   6  Erectile dysfunction (607 84) (N52 9)   7  Microalbuminuria (791 0) (R80 9)   8  Morbid obesity with BMI of 50 0-59 9, adult (278 01,V85 43) (E66 01,Z68 43)   9  Need for pneumococcal vaccination (V03 82) (Z23)   10  Need for prophylactic vaccination and inoculation against influenza (V04 81) (Z23)   11  Obesity (278 00) (E66 9)   12  Pes planus of both feet (734) (M21 41,M21 42)   13  Type 2 diabetes mellitus with hyperglycemia (250 00) (E11 65)   14  Xerosis of skin (706 8) (L85 3)    Social History    · Chews tobacco (305 1) (Z78 9)   · Current Smokeless Tobacco User   · Never a smoker   · History of Never A Smoker   · Tobacco use    Past Medical History    1   History of Brittle nails (703 8) (L60 3)   2  History of Chest congestion (786 9) (R09 89)   3  History of Chewing difficulty (783 3) (R63 3)   4  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) (F06 31)   5  History of Dyspepsia (536 8) (K30)   6  History of athlete's foot (V12 09) (Z86 19)   7  History of peripheral neuropathy (V12 49) (Z86 69)   8  History of type 2 diabetes mellitus (V12 29) (Z86 39)   9  History of upper respiratory infection (V12 09) (Z87 09)   10  History of Upper respiratory infection with cough and congestion (465 9) (J06 9)    Current Meds   1  Aspirin 81 MG Oral Tablet Delayed Release; TAKE 1 TABLET DAILY; Therapy: 72Vpv9769 to (Evaluate:11Yyq9600)  Requested for: 93Pgn5939; Last   Rx:05Lzs0471 Ordered   2  Atorvastatin Calcium 10 MG Oral Tablet; take 1 tablet daily at bedtime; Therapy: 06NXX6684 to (Evaluate:32Gzr7854)  Requested for: 23Cle8342; Last   Rx:54Msd7637 Ordered   3  BD Pen Needle Short U/F 31G X 8 MM Miscellaneous; use to inject Lantus twice daily; Therapy: 27MHW0054 to (Evaluate:67Pea8104)  Requested for: 62Wpr0022; Last   Rx:59Der6313 Ordered   4  FreeStyle Lancets Miscellaneous; may check sugar up to 3 times daily; Therapy: 17AUG0834 to (Evaluate:19Jun2017)  Requested for: 83Xgh9049; Last   Rx:92Yov0050 Ordered   5  FreeStyle Lite Device; make check blood sugars uo to 3 times daily; Therapy: 88Axx0894 to (Evaluate:82Uos7930)  Requested for: 66Ouz6037; Last   Rx:91Mjd4749 Ordered   6  FreeStyle Lite Test In Vitro Strip; may test blood sugar up to 3 times daily; Therapy: 18YJP7552 to (Evaluate:19Jun2017)  Requested for: 65Czn2712; Last   Rx:54Oyq4604 Ordered   7  Gabapentin 300 MG Oral Capsule; TAKE 1 CAPSULE TWICE DAILY; Therapy: 11NEX1897 to (Evaluate:21Mar2017)  Requested for: 44Bqg2309; Last   Rx:74Xgs5346 Ordered   8   HumaLOG Mix 75/25 (75-25) 100 UNIT/ML Subcutaneous Suspension; INJECT 40 UNIT   Every twelve hours; Therapy: 67Gmo0109 to (Evaluate:20Apr2017)  Requested for: 68Viv8982; Last   Rx:28Snk8644 Ordered   9  Insulin Syringe 29G X 1/2" 0 5 ML Miscellaneous; for use with humalog 40 units twice a   day with meals; Therapy: 37JZE3969 to (Evaluate:19Jun2017)  Requested for: 52Aeb4472; Last   Rx:62Sah2274 Ordered   10  Lantus SoloStar 100 UNIT/ML Subcutaneous Solution Pen-injector; INJECT 50 UNITS    EVERY MORNING AND 50 UNITS AT BEDTIME; Therapy: 26NUN7130 to (Evaluate:21Mar2017)  Requested for: 01Qmk6583; Last    Rx:65Cpp5367 Ordered   11  Lisinopril-Hydrochlorothiazide 20-25 MG Oral Tablet; TAKE ONE TABLET BY MOUTH    ONCE DAILY; Therapy: 24NYB1453 to (Evaluate:19Jun2017)  Requested for: 75Xfo2353; Last    Rx:46Twc1824 Ordered   12  MetFORMIN HCl - 1000 MG Oral Tablet; TAKE 1 TABLET DAILY AS DIRECTED; Therapy: 01MBN4125 to (Evaluate:19Jun2017)  Requested for: 61Mef2675; Last    Rx:28Zph2449 Ordered   13  Sharps Container Miscellaneous; for use with insulin needles; Therapy: 32BHD5804 to (Last Rx:90Ukx1732) Ordered    Allergies    1  TETANUS    Assessment    1  Erectile dysfunction (607 84) (N52 9)   2  Type 2 diabetes mellitus with hyperglycemia (250 00) (E11 65)    1  Type 2 diabetes: Poor control, but able to maintain glucose without insulin based on diet changes  If he maintains his diet and adds insulin back, he may be able to reach target glucose  2  Erectile dysfunction: Likely an androgen/estrogen imbalance due to obesity in combination with diabetes-related vascular insufficiency  May benefit from PDE5 inhibitor  Plan    1  Viagra 100 MG Oral Tablet; TAKE 1 TABLET DAILY 1 HOUR BEFORE NEEDED    2  HumaLOG Mix 75/25 (75-25) 100 UNIT/ML Subcutaneous Suspension   3  FreeStyle Lite Test In Vitro Strip; may test blood sugar up to 3 times daily    4  HumaLOG KwikPen 100 UNIT/ML Subcutaneous Solution Pen-injector; 10 units   three time daily with meals   5   FreeStyle Lite Device; make check blood sugars uo to 3 times daily   6  Lantus SoloStar 100 UNIT/ML Subcutaneous Solution Pen-injector; inject 40   units every morning and 40 units at bedtime    1  Restart Lantus as 40 units twice daily  2  Start Humalog 10 units three times daily with food  3  Continue metformin 1000 mg daily  4  May try sildenafil 100 mg as needed 60 minutes prior to desired intercourse  5  Educated on purpose, proper use, and potential ADRs of all above  6  Check glucose at least twice daily and bring records to next visit (new glucometer provided)  7  Will follow up with phone call in one week       Future Appointments    Date/Time Provider Specialty Site   02/10/2017 01:50 PM [facility], [address] [place] [address]     Signatures   Electronically signed by : Oxana Phillips; Christopher 10 2017  9:10PM EST                       (Author)    Electronically signed by : Gigi Carrillo DO; Jan 11 2017  3:02PM EST                       (Review)

## 2018-03-07 NOTE — PROGRESS NOTES
Chief Complaint  Chief Complaint Free Text Note Form: 1 month diabetes follow-up  History of Present Illness  HPI: 55yo male presents to clinic today without SMBG log  When asked how his sugars have been, he stated his morning was 177 before eating  He admitted this is the only time he has taken his sugar "in a year " He also claims not to have experienced hyper or hypoglycemic events in over a year  When asked about his insulin, he claimed to only use his Novolog once because he does not eat regular meals  Additionally, he uses Lantus 50 units nightly, but occasionally (1-2x/week) injects an additional 50 units in the morning if he feels his blood sugar is high  Previously, the use of Panax Ginseng 200mg TID was discussed for erectile dysfunction relief  The patient claimed he was adherent to this, but received no relief and was wanting to try a new agent  He is interested in receiving the flu shot today  Active Problems    1  Acid reflux (530 81) (K21 9)   2  Acute bronchitis (466 0) (J20 9)   3  Allergic rhinitis (477 9) (J30 9)   4  Benign essential hypertension (401 1) (I10)   5  Bipolar disease, chronic (296 80) (F31 9)   6  Borderline hyperlipidemia (272 4) (E78 5)   7  Diabetic polyneuropathy (250 60,357 2) (E11 42)   8  Equinus deformity of foot (736 79) (M21 6X9)   9  Erectile dysfunction (607 84) (N52 9)   10  Gasping for breath (786 09) (R06 89)   11  Microalbuminuria (791 0) (R80 9)   12  Morbid obesity with BMI of 50 0-59 9, adult (278 01,V85 43) (E66 01,Z68 43)   13  Need for pneumococcal vaccination (V03 82) (Z23)   14  Need for prophylactic vaccination and inoculation against influenza (V04 81) (Z23)   15  Obesity (278 00) (E66 9)   16  Pes planus of both feet (734) (M21 41,M21 42)   17  Screening for colon cancer (V76 51) (Z12 11)   18  Snoring (786 09) (R06 83)   19  Type 2 diabetes mellitus with hyperglycemia (250 00) (E11 65)   20   Xerosis of skin (706 8) (L85 3)    Social History    · Chews tobacco (305 1) (Z72 0)   · Current Smokeless Tobacco User   · Never a smoker   · History of Never A Smoker   · Tobacco use    Past Medical History    1  History of Brittle nails (703 8) (L60 3)   2  History of Chest congestion (786 9) (R09 89)   3  History of Chewing difficulty (783 3) (R63 3)   4  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) (F06 31)   5  History of Dyspepsia (536 8) (K30)   6  History of athlete's foot (V12 09) (Z86 19)   7  History of peripheral neuropathy (V12 49) (Z86 69)   8  History of type 2 diabetes mellitus (V12 29) (Z86 39)   9  History of upper respiratory infection (V12 09) (Z87 09)   10  History of Upper respiratory infection with cough and congestion (465 9) (J06 9)    Current Meds   1  AmLODIPine Besylate 5 MG Oral Tablet; TAKE 1 TABLET DAILY; Therapy: 91NCV0496 to (Evaluate:17Ywn5506)  Requested for: 30Epi8304; Last   Rx:66Qul3520 Ordered   2  Aspirin 81 MG Oral Tablet Delayed Release; TAKE 1 TABLET DAILY; Therapy: 93God4812 to (Murali Passe)  Requested for: 50Ylv1676; Last   MM:97OYC8971 Ordered   3  Atorvastatin Calcium 10 MG Oral Tablet; take 1 tablet daily at bedtime; Therapy: 46TAA5105 to (Murali Passe)  Requested for: 72Iaa6442; Last   QJ:35KXP2384 Ordered   4  BD Pen Needle Short U/F 31G X 8 MM Miscellaneous; use to inject Lantus twice daily; Therapy: 52IIN9372 to (Evaluate:62Nlo6751)  Requested for: 55Ydm1734; Last   Rx:10Phm0718 Ordered   5  Benzonatate 100 MG Oral Capsule; TAKE 1 CAPSULE 3 TIMES DAILY AS NEEDED; Therapy: 61Iyu6777 to (Evaluate:18Ebk7305)  Requested for: 51Qjs5537; Last   Rx:31Gkq0469 Ordered   6  Dulcolax 5 MG Oral Tablet Delayed Release; take 4 tablets together  as per GI pt   handout/instructions; Therapy: 77Cyf8596 to (Last Rx:08Zix5315)  Requested for: 78Nif2370 Ordered   7  FreeStyle Lancets Miscellaneous; may check sugar up to 3 times daily;    Therapy: 10EOP6152 to (Evaluate:27Snp0665)  Requested for: 25Jan2017; Last   Rx:25Jan2017 Ordered   8  FreeStyle Lite Device; make check blood sugars uo to 3 times daily; Therapy: 09Wnq3124 to (Evaluate:25Jan2018)  Requested for: 25Jan2017; Last   Rx:25Jan2017 Ordered   9  FreeStyle Lite Test In Vitro Strip; may test blood sugar up to 3 times daily; Therapy: 17MQY0309 to (Evaluate:71Zej6934)  Requested for: 64GDF4665; Last   Rx:25Jan2017 Ordered   10  Ginseng 250 MG Oral Capsule; Take 1 tab (200mg ginseng) three times daily; Therapy: 15NMO1351 to Recorded   11  HumaLOG KwikPen 100 UNIT/ML Subcutaneous Solution Pen-injector; 15 units three    time daily with meals; Therapy: 56YSC2007 to (Zulema Sheth)  Requested for: 29Ehi7284; Last    Rx:61Xka1763 Ordered   12  Insulin Syringe 29G X 1/2" 0 5 ML Miscellaneous; for use with humalog 40 units twice a    day with meals; Therapy: 83XJI0035 to (Evaluate:19Jun2017)  Requested for: 55Bye3925; Last    Rx:05Ofm9411 Ordered   13  Lantus SoloStar 100 UNIT/ML Subcutaneous Solution Pen-injector; Inject 50 units at    bedtime; Therapy: 28ZZK2349 to (Evaluate:55Kly0771)  Requested for: 72Ekr2737; Last    Rx:13Pdd6133 Ordered   14  Lisinopril-Hydrochlorothiazide 20-25 MG Oral Tablet; TAKE ONE TABLET BY MOUTH    ONCE DAILY; Therapy: 55FZS5680 to (Evaluate:55Dqd6152)  Requested for: 70Lgd8870; Last    Rx:37Nat5847 Ordered   15  Polyethylene Glycol 3350 Oral Powder; mix Miralax 238gm bottle into 64oz Gatorade -    drink the night before the colonoscopy and 1 hour after Dulcolax taken; Therapy: 21Vsc8964 to (Last Rx:38Hor5524)  Requested for: 58Uiz9647 Ordered   16  RaNITidine HCl - 150 MG Oral Tablet; TAKE 1 TABLET EVERY 12 HOURS AS NEEDED; Therapy: 05PDO9762 to (Evaluate:64Kej6082)  Requested for: 64Iro5040; Last    Rx:20Xqy3591 Ordered   17  Sharps Container Miscellaneous; for use with insulin needles; Therapy: 78DMO1329 to (Last Rx:01Rlq4834) Ordered   18  Ventolin  (90 Base) MCG/ACT Inhalation Aerosol Solution; inhale 1 to 2 puffs    every 4 to 6 hours if needed; Therapy: 52Mns2086 to (Last Rx:84Rbr7430)  Requested for: 53Oli2046 Ordered    Allergies    1  TETANUS    Assessment    1  Erectile dysfunction (767 77) (N52 9)    Type 2 Diabetes: Control is unknown at this point without SMBG log  Encouraged patient to use Lantus once daily, and discussed greater adherence for Novolog  In regular absence of Novolog use, an increase in basal insulin can be beneficial  Encouraged patient to test and record sugar once daily  Erectile Dysfunction: Patient has not received relief  Discussed potential benefit of sildenafil  Plan    1  Sildenafil Citrate 20 MG Oral Tablet; Take 2 to 3 tablets by mouth 30 minutes prior   to sexual activity    2  Influenza    1  Increase Humalog 60 units HS  2  STOP Panax ginseng 200mg TID  3  Start Sildenifil 20mg two to three tablets 30 minutes prior to intercourse  Instructed patient not to use more than 3 tablets without consulting prior  4  Continue Novolog 15 units TID with meals  5  Restart testing blood sugar daily  6  Administered flu shot  7  F/U visit in 1 month to assess sildenafil efficacy     Future Appointments    Date/Time Provider Specialty Site   10/31/2017 01:00 PM Wilfrido García, [address] PCP   11/06/2017 10:00 AM Susie Stahl RPA-C Psychiatry [address] [facility]   11/22/2017 02:00 PM STEPHANIE CalhounW, ACSW  [facility]   12/08/2017 08:00 AM Clementine Styles MD Gastroenterology [facility] OUTPATIENT     Attending Note  I have met with the patient and reviewed the medical chart  His focus for the visit was his ED  He is not checking blood glucoses and we are only able to safely increase his basal insulin based on reports of home readings   The ginseng was not effective for his ED, but he did not get to doses high enough to show effect  Willing to try sildenafil via 20 mg tablets  Provided him coupons to try and decrease price/cost      Discussed with him that his ED is secondary to poorly controlled diabetes  If he does not start better controlling DM, then his ED will never be fixed, no matter what meds he uses  He seemed to respond to this and is going to try and change his ways  Signatures   Electronically signed by : Jori Ormond, Pharm  D ; Sep 28 2017 11:54AM EST                       (Co-author)    Electronically signed by : Heri Han; Sep 28 2017  1:54PM EST                       (Author)    Electronically signed by : Nathaniel Artis DO; Sep 28 2017  4:48PM EST                       (Review)

## 2018-04-16 ENCOUNTER — OFFICE VISIT (OUTPATIENT)
Dept: PSYCHIATRY | Facility: CLINIC | Age: 53
End: 2018-04-16
Payer: MEDICARE

## 2018-04-16 VITALS
SYSTOLIC BLOOD PRESSURE: 133 MMHG | HEIGHT: 66 IN | WEIGHT: 315 LBS | HEART RATE: 84 BPM | BODY MASS INDEX: 50.62 KG/M2 | DIASTOLIC BLOOD PRESSURE: 88 MMHG

## 2018-04-16 DIAGNOSIS — F41.0 PANIC ATTACKS: ICD-10-CM

## 2018-04-16 DIAGNOSIS — F31.60 BIPOLAR AFFECTIVE DISORDER, CURRENT EPISODE MIXED, CURRENT EPISODE SEVERITY UNSPECIFIED (HCC): Primary | ICD-10-CM

## 2018-04-16 DIAGNOSIS — F41.1 ANXIETY STATE: ICD-10-CM

## 2018-04-16 PROCEDURE — 99214 OFFICE O/P EST MOD 30 MIN: CPT | Performed by: PHYSICIAN ASSISTANT

## 2018-04-16 RX ORDER — CLONAZEPAM 1 MG/1
1 TABLET ORAL DAILY PRN
Qty: 30 TABLET | Refills: 1 | Status: SHIPPED | OUTPATIENT
Start: 2018-04-16 | End: 2018-06-15 | Stop reason: SDUPTHER

## 2018-04-16 RX ORDER — BUSPIRONE HYDROCHLORIDE 5 MG/1
5 TABLET ORAL 2 TIMES DAILY
Qty: 180 TABLET | Refills: 0 | Status: SHIPPED | OUTPATIENT
Start: 2018-04-16 | End: 2018-06-15 | Stop reason: SDUPTHER
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
## 2018-05-07 ENCOUNTER — OFFICE VISIT (OUTPATIENT)
Dept: BEHAVIORAL/MENTAL HEALTH CLINIC | Facility: CLINIC | Age: 53
End: 2018-05-07
Payer: MEDICARE

## 2018-05-07 DIAGNOSIS — F41.0 PANIC ATTACKS: ICD-10-CM

## 2018-05-07 DIAGNOSIS — F31.60 BIPOLAR AFFECTIVE DISORDER, CURRENT EPISODE MIXED, CURRENT EPISODE SEVERITY UNSPECIFIED (HCC): Primary | ICD-10-CM

## 2018-05-07 DIAGNOSIS — F41.1 ANXIETY STATE: ICD-10-CM

## 2018-05-07 PROCEDURE — 90834 PSYTX W PT 45 MINUTES: CPT | Performed by: SOCIAL WORKER

## 2018-05-07 NOTE — PSYCH
Rosaura Licona  1965       Date of Initial Treatment Plan: 05/07/2018   Date of Current Treatment Plan: 05/07/18    Treatment Plan Number initial     Strengths/Personal Resources for Self Care: Kindness, passion for life, willingness to help    Diagnosis: Bipolar Affective mixed, other anxiety states, panic attacks    Area of Needs: 1- I need to have a more stable mood  2- I need to decrease the intensity and the frequency of my panic attacks,   3- I need to deal more effectively with stressful situations and with difficult people  Long Term Goal 1: I need to have and maintain a more stable mood  Target Date: 09/07/2018  Completion Date: TBD         Short Term Objectives for Goal 1: I will practice mindfulnessmeditation, deep breathing and I will be compliant with my medications  Long Term Goal 2: I need to decrease the frequency and the duration of my panic attacks    Target Date: 09/07/2018  Completion Date: TBD    Short Term Objectives for Goal 2: I will practice mindfulness         Long Term Goal # 3: I need to deal more effectively with stressful situations and difficult people  Target Date: 09/072017  Completion Date: TBD    Short Term Objectives for Goal 3: In therapy will work on stress and anger management  GOAL 1: Modality: Individual 2x per month   Completion Date TBD    GOAL 2: Modality:Individual 2 times per [id number] TBD     GOAL 3: Modality: Individual 2x per month   Completion Date TBD      Behavioral Health Treatment Plan  Luke: Diagnosis and Treatment Plan explained to Seb Hanks relates understanding diagnosis and is agreeable to Treatment Plan         Client Comments : Please share your thoughts, feelings, need and/or experiences regarding your treatment plan: __________________________________________________________________    __________________________________________________________________    __________________________________________________________________    __________________________________________________________________    _______________________________________                Patient signature, Date Time: __________________________________________             Physician cosigner signature, Date, Time: ________________________________

## 2018-05-11 PROBLEM — K21.9 ACID REFLUX: Status: ACTIVE | Noted: 2017-09-05

## 2018-05-11 RX ORDER — PREDNISOLONE ACETATE 10 MG/ML
SUSPENSION/ DROPS OPHTHALMIC
Refills: 2 | Status: ON HOLD | COMMUNITY
Start: 2018-02-13 | End: 2018-06-14

## 2018-05-11 RX ORDER — OMEPRAZOLE 20 MG/1
1 CAPSULE, DELAYED RELEASE ORAL DAILY
COMMUNITY
Start: 2017-11-07 | End: 2018-05-14 | Stop reason: SDUPTHER

## 2018-05-11 RX ORDER — AMLODIPINE BESYLATE 5 MG/1
1 TABLET ORAL DAILY
COMMUNITY
Start: 2017-09-05 | End: 2018-05-14 | Stop reason: SDUPTHER

## 2018-05-11 RX ORDER — LANCETS 28 GAUGE
EACH MISCELLANEOUS 3 TIMES DAILY
COMMUNITY
Start: 2013-03-08 | End: 2018-09-04 | Stop reason: SDUPTHER

## 2018-05-11 RX ORDER — ZIPRASIDONE HYDROCHLORIDE 40 MG/1
CAPSULE ORAL
COMMUNITY
Start: 2017-11-06 | End: 2018-06-15 | Stop reason: ALTCHOICE

## 2018-05-11 RX ORDER — LISINOPRIL AND HYDROCHLOROTHIAZIDE 25; 20 MG/1; MG/1
1 TABLET ORAL DAILY
COMMUNITY
Start: 2013-03-08 | End: 2018-05-14 | Stop reason: SDUPTHER

## 2018-05-11 RX ORDER — ATORVASTATIN CALCIUM 10 MG/1
TABLET, FILM COATED ORAL
Refills: 3 | COMMUNITY
Start: 2018-02-23 | End: 2018-05-14 | Stop reason: SDUPTHER

## 2018-05-11 RX ORDER — IBUPROFEN 200 MG
TABLET ORAL
COMMUNITY
Start: 2013-11-26

## 2018-05-11 RX ORDER — SILDENAFIL CITRATE 20 MG/1
TABLET ORAL
Status: ON HOLD | COMMUNITY
Start: 2017-09-28 | End: 2018-09-12

## 2018-05-14 ENCOUNTER — OFFICE VISIT (OUTPATIENT)
Dept: INTERNAL MEDICINE CLINIC | Facility: CLINIC | Age: 53
End: 2018-05-14
Payer: MEDICARE

## 2018-05-14 VITALS
DIASTOLIC BLOOD PRESSURE: 88 MMHG | HEIGHT: 66 IN | WEIGHT: 315 LBS | BODY MASS INDEX: 50.62 KG/M2 | SYSTOLIC BLOOD PRESSURE: 138 MMHG | HEART RATE: 100 BPM | TEMPERATURE: 98 F

## 2018-05-14 DIAGNOSIS — E66.01 MORBID OBESITY WITH BMI OF 50.0-59.9, ADULT (HCC): ICD-10-CM

## 2018-05-14 DIAGNOSIS — Z12.11 SCREENING FOR COLON CANCER: Primary | ICD-10-CM

## 2018-05-14 DIAGNOSIS — E11.65 TYPE 2 DIABETES MELLITUS WITH HYPERGLYCEMIA, UNSPECIFIED WHETHER LONG TERM INSULIN USE (HCC): ICD-10-CM

## 2018-05-14 DIAGNOSIS — Z23 NEED FOR TDAP VACCINATION: ICD-10-CM

## 2018-05-14 DIAGNOSIS — K21.9 GASTROESOPHAGEAL REFLUX DISEASE, ESOPHAGITIS PRESENCE NOT SPECIFIED: ICD-10-CM

## 2018-05-14 DIAGNOSIS — G47.9 SLEEP DISTURBANCE: ICD-10-CM

## 2018-05-14 DIAGNOSIS — I10 BENIGN ESSENTIAL HYPERTENSION: ICD-10-CM

## 2018-05-14 DIAGNOSIS — F31.60 BIPOLAR AFFECTIVE DISORDER, CURRENT EPISODE MIXED, CURRENT EPISODE SEVERITY UNSPECIFIED (HCC): ICD-10-CM

## 2018-05-14 LAB — SL AMB POCT HEMOGLOBIN AIC: 10.4

## 2018-05-14 PROCEDURE — 83036 HEMOGLOBIN GLYCOSYLATED A1C: CPT | Performed by: PHYSICIAN ASSISTANT

## 2018-05-14 PROCEDURE — 99214 OFFICE O/P EST MOD 30 MIN: CPT | Performed by: PHYSICIAN ASSISTANT

## 2018-05-14 RX ORDER — LISINOPRIL AND HYDROCHLOROTHIAZIDE 25; 20 MG/1; MG/1
1 TABLET ORAL DAILY
Qty: 90 TABLET | Refills: 1 | Status: SHIPPED | OUTPATIENT
Start: 2018-05-14 | End: 2018-11-04 | Stop reason: SDUPTHER

## 2018-05-14 RX ORDER — AMLODIPINE BESYLATE 5 MG/1
5 TABLET ORAL DAILY
Qty: 90 TABLET | Refills: 1 | Status: SHIPPED | OUTPATIENT
Start: 2018-05-14 | End: 2018-11-04 | Stop reason: SDUPTHER

## 2018-05-14 RX ORDER — INSULIN LISPRO 100 [IU]/ML
INJECTION, SOLUTION INTRAVENOUS; SUBCUTANEOUS
Qty: 54 ML | Refills: 1 | OUTPATIENT
Start: 2018-05-14

## 2018-05-14 RX ORDER — ATORVASTATIN CALCIUM 10 MG/1
10 TABLET, FILM COATED ORAL DAILY
Qty: 90 TABLET | Refills: 1 | Status: SHIPPED | OUTPATIENT
Start: 2018-05-14 | End: 2018-08-27 | Stop reason: SDUPTHER

## 2018-05-14 NOTE — PROGRESS NOTES
Assessment/Plan:    I have refilled all of your medications today  Please increase her Lantus to 40 units twice a day  You can continue your Humalog 30 units twice daily  Please do your readings with a fasting sugar in the morning and a 2nd reading 2 hours after dinner  I have provided you with the papers to fill this in  As we discussed today I need to see where your numbers are to safely adjust your Humalog  Your A1c test in the office today of 10 4% confirm significant increased from last check in December when it was 9 4%   we will get you a new appointment for the EGD for ongoing reflux symptoms  You will complete the fit test for colon cancer screening  I have also provided you with a new referral for sleep consultation for possible sleep obstruction  Please schedule an appointment here in 4-6 weeks so that we may review your glucose logs        No problem-specific Assessment & Plan notes found for this encounter  Diagnoses and all orders for this visit:    Screening for colon cancer  -     Occult Bloood,Fecal Immunochemical; Future    Type 2 diabetes mellitus with hyperglycemia, unspecified whether long term insulin use (HCC)  -     POCT hemoglobin A1c  -     Comprehensive metabolic panel  -     Lipid Panel with Direct LDL reflex  -     Microalbumin / creatinine urine ratio  -     atorvastatin (LIPITOR) 10 mg tablet; Take 1 tablet (10 mg total) by mouth daily for 180 days  -     insulin glargine (LANTUS SOLOSTAR) injection pen 100 units/mL; 40 units twice daily  -     insulin lispro (HUMALOG KWIKPEN) 100 Units/mL SOPN; Inject 30 Units under the skin 2 (two) times a day with meals for 90 days    Need for Tdap vaccination  -     Cancel: Tdap vaccine greater than or equal to 6yo IM    Benign essential hypertension  -     amLODIPine (NORVASC) 5 mg tablet; Take 1 tablet (5 mg total) by mouth daily for 180 days  -     aspirin 81 MG tablet;  Take 1 tablet (81 mg total) by mouth daily for 180 days  -     lisinopril-hydrochlorothiazide (PRINZIDE,ZESTORETIC) 20-25 MG per tablet; Take 1 tablet by mouth daily for 180 days    Bipolar affective disorder, current episode mixed, current episode severity unspecified ([facility] )    Morbid obesity with BMI of 50 0-59 9, adult ([facility])    Gastroesophageal reflux disease, esophagitis presence not specified  -     Ambulatory Referral to GI Endoscopy; Future    Sleep disturbance  -     Sleep Consult - N/P Only; Future    Other orders  -     Cancel: POCT diabetic eye exam  -     Multiple Vitamins-Minerals (MENS MULTI VITAMIN & MINERAL PO); Take by mouth          Subjective:      Patient ID: Josh Saldana is a 47 y o  male  Pt here for check up   States EGD was cancelled as he ate a tomato day before scope, did not reschedule  Reports taking the Lantus 30 units twice daily    Humalog 30 units twice daily    Continues to not check any readings    Still never sched sleep study, continues with snoring and daytime somnolence although that is multifactorial with diet/ weight and mental health    Patient reports no ER or hospital visits since last office visit in December  Patient and I have discussed his weight and how it is contributing to his poor health including diabetes cholesterol feeling tired shortness of breath  Patient in the past as stated he does not want to change his food and aware with weight loss surgery he would have to significantly change is eating and did not feel ready to make these changes  Will have patient complete the sleep evaluation and when he follows up will rediscuss this  The following portions of the patient's history were reviewed and updated as appropriate: allergies, current medications, past family history, past medical history, past social history, past surgical history and problem list     Review of Systems   Constitutional: Negative for chills and fever  HENT: Positive for congestion and rhinorrhea      Eyes: Cataract surgery R eye well L eye developed an infection but given antibiotic drops and resolved   Respiratory: Positive for shortness of breath  Negative for cough  SOB with walking but unchanged from usual   Cardiovascular: Negative  Negative for chest pain and palpitations  Gastrointestinal: Positive for abdominal pain  Patient continues to experience frequent reflux symptoms despite medications  It is also noted patient has not always been compliant with dietary changes  Endocrine: Negative for polydipsia, polyphagia and polyuria  Genitourinary: Negative for frequency, hematuria and urgency  Musculoskeletal: Positive for arthralgias, back pain and myalgias  Negative for joint swelling  Skin: Negative  Allergic/Immunologic: Positive for environmental allergies  Negative for food allergies  Neurological: Positive for numbness  Continues with tingling in feet but never in hands   Psychiatric/Behavioral:        Feels with MH and meds and therapy mood is improving but still some anger issues         Objective:      /88 (BP Location: Left arm, Patient Position: Sitting, Cuff Size: Large)   Pulse 100   Temp 98 °F (36 7 °C) (Oral)   Ht 5' 6" (1 676 m)   Wt (!) 148 kg (326 lb 11 6 oz)   BMI 52 73 kg/m²          Physical Exam   Constitutional: He is oriented to person, place, and time  He appears well-developed and well-nourished  Pleasant, Morbidly obese   HENT:   Head: Normocephalic and atraumatic  Right Ear: External ear normal    Left Ear: External ear normal    Eyes: Conjunctivae are normal  Pupils are equal, round, and reactive to light  Neck: Normal range of motion  Neck supple  Cardiovascular: Normal rate, regular rhythm and normal heart sounds  No murmur heard  Pulmonary/Chest: Effort normal and breath sounds normal  He has no wheezes  Abdominal: Soft  Bowel sounds are normal  There is no tenderness     Musculoskeletal:   Very limited ROM due to body habitus   Neurological: He is alert and oriented to person, place, and time  Skin: Skin is warm and dry  Psychiatric: He has a normal mood and affect   His behavior is normal  Judgment and thought content normal

## 2018-05-23 PROBLEM — K21.9 GASTROESOPHAGEAL REFLUX DISEASE WITHOUT ESOPHAGITIS: Status: ACTIVE | Noted: 2018-05-23

## 2018-05-25 ENCOUNTER — TELEPHONE (OUTPATIENT)
Dept: INTERNAL MEDICINE CLINIC | Facility: CLINIC | Age: 53
End: 2018-05-25

## 2018-05-25 ENCOUNTER — APPOINTMENT (OUTPATIENT)
Dept: LAB | Facility: CLINIC | Age: 53
End: 2018-05-25
Payer: MEDICARE

## 2018-05-25 LAB
ALBUMIN SERPL BCP-MCNC: 2.9 G/DL (ref 3.5–5)
ALP SERPL-CCNC: 98 U/L (ref 46–116)
ALT SERPL W P-5'-P-CCNC: 18 U/L (ref 12–78)
ANION GAP SERPL CALCULATED.3IONS-SCNC: 6 MMOL/L (ref 4–13)
AST SERPL W P-5'-P-CCNC: 11 U/L (ref 5–45)
BILIRUB SERPL-MCNC: 0.2 MG/DL (ref 0.2–1)
BUN SERPL-MCNC: 10 MG/DL (ref 5–25)
CALCIUM SERPL-MCNC: 9 MG/DL (ref 8.3–10.1)
CHLORIDE SERPL-SCNC: 103 MMOL/L (ref 100–108)
CHOLEST SERPL-MCNC: 87 MG/DL (ref 50–200)
CO2 SERPL-SCNC: 29 MMOL/L (ref 21–32)
CREAT SERPL-MCNC: 1.35 MG/DL (ref 0.6–1.3)
CREAT UR-MCNC: 128 MG/DL
GFR SERPL CREATININE-BSD FRML MDRD: 70 ML/MIN/1.73SQ M
GLUCOSE P FAST SERPL-MCNC: 148 MG/DL (ref 65–99)
HDLC SERPL-MCNC: 33 MG/DL (ref 40–60)
LDLC SERPL CALC-MCNC: 24 MG/DL (ref 0–100)
MICROALBUMIN UR-MCNC: 966 MG/L (ref 0–20)
MICROALBUMIN/CREAT 24H UR: 755 MG/G CREATININE (ref 0–30)
POTASSIUM SERPL-SCNC: 4.2 MMOL/L (ref 3.5–5.3)
PROT SERPL-MCNC: 7.2 G/DL (ref 6.4–8.2)
SODIUM SERPL-SCNC: 138 MMOL/L (ref 136–145)
TRIGL SERPL-MCNC: 152 MG/DL

## 2018-05-25 PROCEDURE — 80061 LIPID PANEL: CPT | Performed by: PHYSICIAN ASSISTANT

## 2018-05-25 PROCEDURE — 82570 ASSAY OF URINE CREATININE: CPT | Performed by: PHYSICIAN ASSISTANT

## 2018-05-25 PROCEDURE — 36415 COLL VENOUS BLD VENIPUNCTURE: CPT | Performed by: PHYSICIAN ASSISTANT

## 2018-05-25 PROCEDURE — 80053 COMPREHEN METABOLIC PANEL: CPT | Performed by: PHYSICIAN ASSISTANT

## 2018-05-25 PROCEDURE — 82043 UR ALBUMIN QUANTITATIVE: CPT | Performed by: PHYSICIAN ASSISTANT

## 2018-05-25 NOTE — TELEPHONE ENCOUNTER
This patient is scheduled on 6/14/18 for their EGD procedure  I spoke with the patient and made him aware of his appointment date, I mailed out the instructions      According to the referral order form, this patient needs to hold the following meds:     INSULIN- HOLD EVENING DOSE NIGHT BEFORE AND MORNING OF THE PROCEDURE

## 2018-06-14 ENCOUNTER — HOSPITAL ENCOUNTER (OUTPATIENT)
Facility: HOSPITAL | Age: 53
Setting detail: OUTPATIENT SURGERY
Discharge: HOME | End: 2018-06-14
Attending: INTERNAL MEDICINE | Admitting: INTERNAL MEDICINE
Payer: MEDICARE

## 2018-06-14 ENCOUNTER — ANESTHESIA EVENT (OUTPATIENT)
Dept: GASTROENTEROLOGY | Facility: HOSPITAL | Age: 53
End: 2018-06-14
Payer: MEDICARE

## 2018-06-14 ENCOUNTER — ANESTHESIA (OUTPATIENT)
Dept: GASTROENTEROLOGY | Facility: HOSPITAL | Age: 53
End: 2018-06-14
Payer: MEDICARE

## 2018-06-14 VITALS
OXYGEN SATURATION: 98 % | SYSTOLIC BLOOD PRESSURE: 135 MMHG | HEART RATE: 95 BPM | HEIGHT: 67 IN | DIASTOLIC BLOOD PRESSURE: 89 MMHG | RESPIRATION RATE: 16 BRPM | TEMPERATURE: 97.8 F | BODY MASS INDEX: 49.44 KG/M2 | WEIGHT: 315 LBS

## 2018-06-14 LAB — GLUCOSE SERPL-MCNC: 167 MG/DL (ref 65–140)

## 2018-06-14 PROCEDURE — 82948 REAGENT STRIP/BLOOD GLUCOSE: CPT

## 2018-06-14 PROCEDURE — C1726 CATH, BAL DIL, NON-VASCULAR: HCPCS | Performed by: INTERNAL MEDICINE

## 2018-06-14 PROCEDURE — 43249 ESOPH EGD DILATION <30 MM: CPT | Performed by: INTERNAL MEDICINE

## 2018-06-14 RX ORDER — LIDOCAINE HYDROCHLORIDE 10 MG/ML
INJECTION, SOLUTION EPIDURAL; INFILTRATION; INTRACAUDAL; PERINEURAL AS NEEDED
Status: DISCONTINUED | OUTPATIENT
Start: 2018-06-14 | End: 2018-06-14 | Stop reason: SURG

## 2018-06-14 RX ORDER — SODIUM CHLORIDE 9 MG/ML
125 INJECTION, SOLUTION INTRAVENOUS CONTINUOUS
Status: DISCONTINUED | OUTPATIENT
Start: 2018-06-14 | End: 2018-06-14

## 2018-06-14 RX ORDER — PROPOFOL 10 MG/ML
INJECTION, EMULSION INTRAVENOUS AS NEEDED
Status: DISCONTINUED | OUTPATIENT
Start: 2018-06-14 | End: 2018-06-14 | Stop reason: SURG

## 2018-06-14 RX ADMIN — SODIUM CHLORIDE 125 ML/HR: 900 INJECTION, SOLUTION INTRAVENOUS at 11:02

## 2018-06-14 RX ADMIN — PROPOFOL 50 MG: 10 INJECTION, EMULSION INTRAVENOUS at 11:34

## 2018-06-14 RX ADMIN — PROPOFOL 50 MG: 10 INJECTION, EMULSION INTRAVENOUS at 11:37

## 2018-06-14 RX ADMIN — PROPOFOL 50 MG: 10 INJECTION, EMULSION INTRAVENOUS at 11:36

## 2018-06-14 RX ADMIN — LIDOCAINE HYDROCHLORIDE 20 MG: 10 INJECTION, SOLUTION EPIDURAL; INFILTRATION; INTRACAUDAL; PERINEURAL at 11:34

## 2018-06-14 RX ADMIN — PROPOFOL 50 MG: 10 INJECTION, EMULSION INTRAVENOUS at 11:35

## 2018-06-14 RX ADMIN — PROPOFOL 50 MG: 10 INJECTION, EMULSION INTRAVENOUS at 11:39
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
## 2018-06-14 NOTE — OP NOTE
[facility]  [address] [facility] [address]  [facility] [address]  Phone: [phone]  Fax: [phone]                  Tremaine Elam   2017 9:10 AM   Office Visit    Description:  Male : 1981   Provider:  Lindy Acuña MD   Department:  [facility]           Reason for Visit     Cough     Sinus Problem           Diagnoses this Visit        Comments    Bronchitis    -  Primary     Ulcerative colitis with complication, unspecified location                To Do List           Goals (5 Years of Data)     None       These Medications        Disp Refills Start End    benzonatate (TESSALON) 200 MG capsule 30 capsule 1 2017    Take 1 capsule (200 mg total) by mouth 3 (three) times daily as needed for Cough. - Oral    Pharmacy: [facility] [address] [facility] [address] [facility] [address] [facility] [address] Ph #: [phone]         [facility] On Call     [facility] On Call Nurse Care Line -  Assistance  Registered nurses in the [facility] On Call Center provide clinical advisement, health education, appointment booking, and other advisory services.  Call for this free service at [phone].             Medications           Message regarding Medications     Verify the changes and/or additions to your medication regime listed below are the same as discussed with your clinician today.  If any of these changes or additions are incorrect, please notify your healthcare provider.        START taking these NEW medications        Refills    benzonatate (TESSALON) 200 MG capsule 1    Sig: Take 1 capsule (200 mg total) by mouth 3 (three) times daily as needed for Cough.    Class: Normal    Route: Oral           Verify that the below list of medications is an accurate representation of the medications you are currently taking.  If none reported, the list may be blank. If incorrect, please contact your healthcare provider. Carry this list  **** GI/ENDOSCOPY REPORT ****     PATIENT NAME: MARILYN ROSSI - VISIT ID:  Patient ID: [id number]   YOB: 1965     INTRODUCTION: Esophagogastroduodenoscopy - A 47 male patient presents for   an outpatient Esophagogastroduodenoscopy at [address] [place] [address]  INDICATIONS: GERD  CONSENT: The benefits, risks, and alternatives to the procedure were   discussed and informed consent was obtained from the patient  The   benefits, risks, and alternatives to the procedure were discussed and   informed consent was obtained from the patient  PREPARATION:  EKG, pulse, pulse oximetry and blood pressure were monitored   throughout the procedure  EKG, pulse, pulse oximetry, and blood pressure   were monitored throughout the procedure  MEDICATIONS: Anesthesia administered by anesthesiologist      PROCEDURE:  The endoscope was passed without difficulty through the mouth   under direct visualization and advanced to the 2nd portion of the   duodenum  The scope was withdrawn and the mucosa was carefully examined  The endoscope was passed without difficulty through the mouth under direct   visualization and advanced to the 2nd portion of the duodenum  The scope   was withdrawn and the mucosa was carefully examined  FINDINGS:   Esophagus: There was a Schatzki's ring in the distal third of   the esophagus  Dilatation was performed, using a balloon  The largest   dilator measured 20 mm  LA Class D esophagitis was found in the   esophagus  There was a 4 cm hiatus hernia visible in the esophagus  Stomach: The stomach appeared to be normal   Pylorus: The pylorus appeared   to be normal, symmetrical, and patent  Duodenum: The duodenum appeared to   be normal      COMPLICATIONS: There were no complications  IMPRESSIONS: Ring present in the distal third of the esophagus  Dilatation   was performed  Esophagitis seen  A hiatus hernia found  Normal stomach     Normal, "with you in case of emergency.           Current Medications     DM/PSEUDOEPHED/ACETAMINOPHEN (VICKS DAYQUIL ORAL) Take by mouth as needed.    benzonatate (TESSALON) 200 MG capsule Take 1 capsule (200 mg total) by mouth 3 (three) times daily as needed for Cough.           Clinical Reference Information           Vital Signs - Last Recorded  Most recent update: 2/2/2017  9:26 AM by Celine Chung LPN    BP Pulse Temp Resp Ht Wt    116/84 85 97.6 °F (36.4 °C) (Oral) 16 5' 11" (1.803 m) 96.8 kg (213 lb 6.5 oz)    BMI                29.76 kg/m2          Blood Pressure          Most Recent Value    BP  116/84      Allergies as of 2/2/2017     Ceclor [Cefaclor]    Codeine    Paragoric      Immunizations Administered on Date of Encounter - 2/2/2017     None      Orders Placed During Today's Visit      Normal Orders This Visit    Case request GI: COLONOSCOPY       " symmetrical, and patent pylorus  Normal duodenum  RECOMMENDATIONS: EGD recommended in 3 months  ESTIMATED BLOOD LOSS:     PATHOLOGY SPECIMENS:     PROCEDURE CODES: 78613 - EGD flexible; with balloon dilation (< 30mm   diameter)     ICD-9 Codes: 530 81 Esophageal reflux 530 3 Stricture and stenosis of   esophagus 530 10 Esophagitis, unspecified 553 3 Diaphragmatic hernia   without mention of obstruction or gangrene     ICD-10 Codes: K21 Gastro-esophageal reflux disease K22 2 Esophageal   obstruction K20 9 Esophagitis, unspecified K44 Diaphragmatic hernia     PERFORMED BY: DARA Barger  on 06/14/2018  Version 1, electronically signed by DARA Atwood  on 06/14/2018 at   12:02

## 2018-06-14 NOTE — ANESTHESIA POSTPROCEDURE EVALUATION
Post-Op Assessment Note      CV Status:  Stable    Mental Status:  Lethargic    Hydration Status:  Stable and euvolemic    PONV Controlled:  None    Airway Patency:  Patent and adequate    Post Op Vitals Reviewed: Yes          Staff: CRNA           /83 (06/14/18 1146)    Temp      Pulse 94 (06/14/18 1146)   Resp 18 (06/14/18 1146)    SpO2 98 % (06/14/18 1146)

## 2018-06-14 NOTE — ANESTHESIA PREPROCEDURE EVALUATION
Review of Systems/Medical History  Patient summary reviewed  Chart reviewed  No history of anesthetic complications     Cardiovascular  Exercise tolerance (METS): >4,  Hypertension controlled,    Pulmonary  Smoker ex-smoker  ,        GI/Hepatic    GERD ,        Negative  ROS        Endo/Other  Diabetes poorly controlled type 2 Insulin,   Obesity    GYN       Hematology  Negative hematology ROS      Musculoskeletal  Negative musculoskeletal ROS        Neurology    Diabetic neuropathy,    Psychology   Anxiety, Depression ,              Physical Exam    Airway    Mallampati score: II  TM Distance: >3 FB  Neck ROM: full     Dental   No notable dental hx     Cardiovascular  Rhythm: regular, Rate: normal,     Pulmonary  Breath sounds clear to auscultation,     Other Findings        Anesthesia Plan  ASA Score- 3     Anesthesia Type- IV sedation with anesthesia with ASA Monitors  Additional Monitors:   Airway Plan:         Plan Factors-  Patient did not smoke on day of surgery  Induction- intravenous  Postoperative Plan-     Informed Consent- Anesthetic plan and risks discussed with patient  I personally reviewed this patient with the CRNA  Discussed and agreed on the Anesthesia Plan with the CRNA  Khadra Villarreal

## 2018-06-15 ENCOUNTER — OFFICE VISIT (OUTPATIENT)
Dept: PSYCHIATRY | Facility: CLINIC | Age: 53
End: 2018-06-15
Payer: MEDICARE

## 2018-06-15 VITALS — HEIGHT: 66 IN | BODY MASS INDEX: 50.62 KG/M2 | WEIGHT: 315 LBS | HEART RATE: 89 BPM

## 2018-06-15 DIAGNOSIS — F41.0 PANIC ATTACKS: ICD-10-CM

## 2018-06-15 DIAGNOSIS — E11.65 TYPE 2 DIABETES MELLITUS WITH HYPERGLYCEMIA, WITHOUT LONG-TERM CURRENT USE OF INSULIN (HCC): ICD-10-CM

## 2018-06-15 DIAGNOSIS — G47.9 SLEEP DISTURBANCE: ICD-10-CM

## 2018-06-15 DIAGNOSIS — F31.60 BIPOLAR AFFECTIVE DISORDER, CURRENT EPISODE MIXED, CURRENT EPISODE SEVERITY UNSPECIFIED (HCC): Primary | ICD-10-CM

## 2018-06-15 DIAGNOSIS — F41.1 ANXIETY STATE: ICD-10-CM

## 2018-06-15 PROCEDURE — 99214 OFFICE O/P EST MOD 30 MIN: CPT | Performed by: PSYCHIATRY & NEUROLOGY

## 2018-06-15 RX ORDER — TRAZODONE HYDROCHLORIDE 50 MG/1
TABLET ORAL
Qty: 90 TABLET | Refills: 0 | Status: SHIPPED | OUTPATIENT
Start: 2018-06-15 | End: 2018-10-13 | Stop reason: SDUPTHER

## 2018-06-15 RX ORDER — CLONAZEPAM 1 MG/1
1 TABLET ORAL DAILY PRN
Qty: 30 TABLET | Refills: 1 | Status: SHIPPED | OUTPATIENT
Start: 2018-06-15 | End: 2018-09-20 | Stop reason: SDUPTHER

## 2018-06-15 RX ORDER — BUSPIRONE HYDROCHLORIDE 5 MG/1
5 TABLET ORAL 2 TIMES DAILY
Qty: 180 TABLET | Refills: 0 | Status: SHIPPED | OUTPATIENT
Start: 2018-06-15 | End: 2018-09-20 | Stop reason: SDUPTHER
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
## 2018-06-17 PROBLEM — K21.00 GASTROESOPHAGEAL REFLUX DISEASE WITH ESOPHAGITIS: Status: ACTIVE | Noted: 2018-05-23

## 2018-06-17 PROBLEM — K44.9 HIATAL HERNIA: Status: ACTIVE | Noted: 2018-06-17

## 2018-06-17 PROBLEM — K21.9 ACID REFLUX: Status: RESOLVED | Noted: 2017-09-05 | Resolved: 2018-06-17

## 2018-06-17 PROBLEM — K22.2 LOWER ESOPHAGEAL RING (SCHATZKI): Status: ACTIVE | Noted: 2018-06-17

## 2018-06-17 NOTE — PROGRESS NOTES
Assessment/Plan:    No problem-specific Assessment & Plan notes found for this encounter  Diagnoses and all orders for this visit:    Type 2 diabetes mellitus with hyperglycemia, without long-term current use of insulin (HCC)    Hiatal hernia  -     omeprazole (PriLOSEC) 20 mg delayed release capsule; Take 1 capsule (20 mg total) by mouth 2 (two) times a day for 90 days    Lower esophageal ring (Schatzki)  -     omeprazole (PriLOSEC) 20 mg delayed release capsule; Take 1 capsule (20 mg total) by mouth 2 (two) times a day for 90 days    Gastroesophageal reflux disease with esophagitis  -     omeprazole (PriLOSEC) 20 mg delayed release capsule; Take 1 capsule (20 mg total) by mouth 2 (two) times a day for 90 days          Subjective:      Patient ID: Bradley Brandt is a 47 y o  male  Patient coming in for lab and glucose log review  Patient seen in May and point of care A1c was 10 4%  Patient currently on Humalog 30 units twice daily however was not checking his sugars and we did not have any readings to adjust this  Labs show lipids are well controlled  Labs also show that creatinine has increased again as well as significant micro albuminuria  Patient's blood pressure was also elevated and therefore his lisinopril dose was increased  DM eye last 3/3/2017  States had his eye exam Jan or Feb this year    Patient and myself as well as with pharmacy have had long discussions with the patient about the need to get better control of his diabetes  Patient is well educated on the condition and can give you all the information however also admits he is not motivated to make dietary changes  Patient was also provided with a fit kit at last visit and this has yet to be completed  Will have colonoscopy    Patient also did have his EGD on June 14th  Patient was found to have a Schatzki ring in the lower 3rd of the esophagus as well as  hiatal hernia    Dilation was performed and patient was advised he would need a new EGD in 3 months  States the plan is to do the colonoscopy and follow up EGD at the same time  Patient states forgot to bring his readings  States this am was 152 and last night was 210 1 5 hours after eating   Has appt with sleep center on 6/22/18  Patient reports will drop off his glucose log        The following portions of the patient's history were reviewed and updated as appropriate: allergies, current medications, past family history, past medical history, past social history, past surgical history and problem list     Review of Systems   Constitutional: Positive for fatigue  Negative for unexpected weight change  Respiratory: Negative  Negative for cough and shortness of breath  Cardiovascular: Negative  Negative for chest pain, palpitations and leg swelling  Gastrointestinal: Positive for abdominal pain  Negative for blood in stool, constipation and diarrhea  Has just been started on PPI twice a day by GI 3 days ago   Endocrine: Positive for polydipsia  Negative for polyphagia and polyuria  Genitourinary: Positive for frequency  Negative for decreased urine volume and difficulty urinating  Musculoskeletal: Negative  Psychiatric/Behavioral: Negative  Objective:      /82 (BP Location: Left arm, Patient Position: Sitting, Cuff Size: Adult)   Pulse 86   Temp 98 3 °F (36 8 °C) (Oral)   Ht 5' 7" (1 702 m)   Wt (!) 147 kg (324 lb 15 3 oz)   BMI 50 90 kg/m²          Physical Exam   Constitutional: He appears well-developed and well-nourished  Cardiovascular: Normal rate, regular rhythm and normal heart sounds  Exam reveals no gallop and no friction rub  No murmur heard  Pulmonary/Chest: Effort normal and breath sounds normal  No respiratory distress  He has no wheezes  Abdominal: Soft  There is tenderness in the epigastric area  Skin: Skin is warm and dry  Psychiatric: He has a normal mood and affect   His behavior is normal

## 2018-06-18 ENCOUNTER — OFFICE VISIT (OUTPATIENT)
Dept: INTERNAL MEDICINE CLINIC | Facility: CLINIC | Age: 53
End: 2018-06-18
Payer: MEDICARE

## 2018-06-18 VITALS
BODY MASS INDEX: 49.44 KG/M2 | HEIGHT: 67 IN | HEART RATE: 86 BPM | SYSTOLIC BLOOD PRESSURE: 128 MMHG | DIASTOLIC BLOOD PRESSURE: 82 MMHG | WEIGHT: 315 LBS | TEMPERATURE: 98.3 F

## 2018-06-18 DIAGNOSIS — K22.2 LOWER ESOPHAGEAL RING (SCHATZKI): ICD-10-CM

## 2018-06-18 DIAGNOSIS — K44.9 HIATAL HERNIA: ICD-10-CM

## 2018-06-18 DIAGNOSIS — E11.65 TYPE 2 DIABETES MELLITUS WITH HYPERGLYCEMIA, WITHOUT LONG-TERM CURRENT USE OF INSULIN (HCC): Primary | ICD-10-CM

## 2018-06-18 DIAGNOSIS — K21.00 GASTROESOPHAGEAL REFLUX DISEASE WITH ESOPHAGITIS: ICD-10-CM

## 2018-06-18 PROCEDURE — 99213 OFFICE O/P EST LOW 20 MIN: CPT | Performed by: PHYSICIAN ASSISTANT

## 2018-06-18 RX ORDER — OMEPRAZOLE 20 MG/1
20 CAPSULE, DELAYED RELEASE ORAL 2 TIMES DAILY
Qty: 180 CAPSULE | Refills: 0 | Status: SHIPPED | OUTPATIENT
Start: 2018-06-18 | End: 2018-09-10 | Stop reason: SDUPTHER

## 2018-06-19 ENCOUNTER — OFFICE VISIT (OUTPATIENT)
Dept: BEHAVIORAL/MENTAL HEALTH CLINIC | Facility: CLINIC | Age: 53
End: 2018-06-19
Payer: MEDICARE

## 2018-06-19 DIAGNOSIS — G47.9 SLEEP DISTURBANCE: ICD-10-CM

## 2018-06-19 DIAGNOSIS — F41.1 GENERALIZED ANXIETY DISORDER: ICD-10-CM

## 2018-06-19 DIAGNOSIS — F31.60 BIPOLAR AFFECTIVE DISORDER, CURRENT EPISODE MIXED, CURRENT EPISODE SEVERITY UNSPECIFIED (HCC): Primary | ICD-10-CM

## 2018-06-19 PROBLEM — F41.0 PANIC ATTACKS: Status: RESOLVED | Noted: 2017-11-06 | Resolved: 2018-06-19

## 2018-06-19 PROCEDURE — 90834 PSYTX W PT 45 MINUTES: CPT | Performed by: SOCIAL WORKER

## 2018-06-19 NOTE — PSYCH
Psychotherapy Provided: Individual Psychotherapy 45 minutes     Length of time in session: 45  minutes, follow up in 2 week    Goals addressed in session: Goal 1, Goal 2 and Goal 3      Pain:      moderate to severe    1    Current suicide risk : Low     Data: The client Mathieu Calix arrived for his session  He said his depression is up and down  His girlfriend and he are getting in to more and more disagreements  We discussed how he should use I messages versus you messages  We discussed his recovery goals of how he can more effectively manage his stress and deal with difficult people  His panic attacks have stopped  He continues to have mood instability  Assessment: We discussed mindfulness, meditation and deep breathing  He is going for walks and listens to music  Plan: Continue to skill build in distress tolerance  Behavioral Health Treatment Plan [facility]: Diagnosis and Treatment Plan explained to Cynthia Feeling relates understanding diagnosis and is agreeable to Treatment Plan   Yes

## 2018-06-22 ENCOUNTER — OFFICE VISIT (OUTPATIENT)
Dept: SLEEP CENTER | Facility: CLINIC | Age: 53
End: 2018-06-22
Payer: MEDICARE

## 2018-06-22 VITALS
WEIGHT: 315 LBS | HEIGHT: 67 IN | SYSTOLIC BLOOD PRESSURE: 132 MMHG | DIASTOLIC BLOOD PRESSURE: 90 MMHG | HEART RATE: 80 BPM | BODY MASS INDEX: 49.44 KG/M2

## 2018-06-22 DIAGNOSIS — G47.9 SLEEP DISTURBANCE: ICD-10-CM

## 2018-06-22 DIAGNOSIS — G47.33 OSA (OBSTRUCTIVE SLEEP APNEA): Primary | ICD-10-CM

## 2018-06-22 PROCEDURE — 99204 OFFICE O/P NEW MOD 45 MIN: CPT | Performed by: INTERNAL MEDICINE

## 2018-06-22 NOTE — PROGRESS NOTES
Consultation - [id number] : 1965  MRN: [id number]      Assessment:  The patient's symptoms are consistent with obstructive sleep apnea  Plan:  Split study    Follow up:  Compliance check after testing    History of Present Illness:   47 y o male with a longstanding history of loud snoring, awakening with a choking sensation and excessive daytime sleepiness  He has been told that he has nighttime apneas  He also has occasional nocturia, approximately twice a night  He has a history of insomnia which has improved with trazodone 50 mg at bedtime  He also has nocturnal gastroesophageal reflux  He is currently treated for depression and anxiety      Review of Systems:        Genitourinary difficulty with erection   Cardiology none   Gastrointestinal frequent heartburn/acid reflux   Neurology awaken with headache   Constitutional none   Integumentary rash or dry skin   Psychiatry none   Musculoskeletal none   Pulmonary snoring   ENT none   Endocrine none   Hematological none             Historical Information    Past Medical History:  Diabetes mellitus, anxiety/depression, hypertension      Family History: non-contributory      Sleep Schedule: unremarkable    Snoring:    Yes      Witnessed Apnea:    Yes    Medications/Allergies:    Current Outpatient Prescriptions:     amLODIPine (NORVASC) 5 mg tablet, Take 1 tablet (5 mg total) by mouth daily for 180 days, Disp: 90 tablet, Rfl: 1    atorvastatin (LIPITOR) 10 mg tablet, Take 1 tablet (10 mg total) by mouth daily for 180 days, Disp: 90 tablet, Rfl: 1    busPIRone (BUSPAR) 5 mg tablet, Take 1 tablet (5 mg total) by mouth 2 (two) times a day for 90 days, Disp: 180 tablet, Rfl: 0    clonazePAM (KlonoPIN) 1 mg tablet, Take 1 tablet (1 mg total) by mouth daily as needed for anxiety (or panic attacks) for up to 30 days, Disp: 30 tablet, Rfl: 1    glucose blood (FREESTYLE LITE) test strip, 1 strip by In Vitro route 3 (three) times a day, Disp: , Rfl:     insulin glargine (LANTUS SOLOSTAR) injection pen 100 units/mL, 40 units twice daily, Disp: 5 pen, Rfl: 1    insulin lispro (HUMALOG KWIKPEN) 100 Units/mL SOPN, Inject 30 Units under the skin 2 (two) times a day with meals for 90 days, Disp: 5 pen, Rfl: 1    Insulin Pen Needle (B-D ULTRAFINE III SHORT PEN) 31G X 8 MM MISC, by Does not apply route, Disp: , Rfl:     INSULIN SYRINGE  5CC/29G 29G X 1/2" 0 5 ML MISC, by Does not apply route, Disp: , Rfl:     Lancets (FREESTYLE) lancets, by Does not apply route 3 (three) times a day, Disp: , Rfl:     lisinopril-hydrochlorothiazide (PRINZIDE,ZESTORETIC) 20-25 MG per tablet, Take 1 tablet by mouth daily for 180 days, Disp: 90 tablet, Rfl: 1    lurasidone (LATUDA) 20 mg tablet, Take 1 tablet (20 mg total) by mouth daily with dinner for 90 days Take with the 80mg tab, Disp: 90 tablet, Rfl: 0    lurasidone (LATUDA) 80 mg tablet, Take 1 tablet (80 mg total) by mouth daily with dinner for 90 days Take with the 20mg tab, Disp: 90 tablet, Rfl: 0    Multiple Vitamins-Minerals (MENS MULTI VITAMIN & MINERAL PO), Take by mouth, Disp: , Rfl:     omeprazole (PriLOSEC) 20 mg delayed release capsule, Take 1 capsule (20 mg total) by mouth 2 (two) times a day for 90 days, Disp: 180 capsule, Rfl: 0    sildenafil (REVATIO) 20 mg tablet, Take by mouth, Disp: , Rfl:     traZODone (DESYREL) 50 mg tablet, Take 1/2 - 1 tab by mouth nightly as needed for insomnia, Disp: 90 tablet, Rfl: 0        No notes on file                  Objective:    Vital Signs:   Vitals:    06/22/18 1000   BP: 132/90   Pulse: 80   Weight: (!) 149 kg (329 lb 3 2 oz)   Height: 5' 7" (1 702 m)     Neck Circumference: 45      Hughes Sleepiness Scale:  Total score: 4    Physical Exam:    General: Alert, appropriate, cooperative, severely overweight    Head: NC/AT, no retrognathia    Nose: No septal deviation, nares not significantly obstructed, mucosa erythematous    Throat: Airway lumen narrowed, tongue base thickened, no tonsils visualized    Neck: Normal, no thyromegaly or lymphadenopathy, no JVD    Heart: RR, normal S1 and S2, no murmurs    Chest: Clear bilaterally    Extremity: No clubbing, cyanosis, no edema    Skin: Warm, dry    Neuro: No motor abnormalities, cranial nerves appear intact      Counseling / Coordination of Care  Total clinic time spent today 25 minutes  Greater than 50% of total time was spent with the patient and / or family counseling and / or coordination of care  A description of the counseling / coordination of care: We discussed the pathophysiology of obstructive sleep apnea as well as the possible treatment options  We also discussed the rationale for positive airway pressure therapy  We also discussed the relationship between overweight and DAISY  DARA Gomez    Board Certified Sleep Specialist

## 2018-06-23 ENCOUNTER — PREP FOR PROCEDURE (OUTPATIENT)
Dept: GASTROENTEROLOGY | Facility: CLINIC | Age: 53
End: 2018-06-23

## 2018-06-23 DIAGNOSIS — Z12.11 SCREEN FOR COLON CANCER: Primary | ICD-10-CM

## 2018-06-23 DIAGNOSIS — E11.65 TYPE 2 DIABETES MELLITUS WITH HYPERGLYCEMIA, UNSPECIFIED WHETHER LONG TERM INSULIN USE (HCC): ICD-10-CM

## 2018-06-23 DIAGNOSIS — Z12.11 SCREEN FOR COLON CANCER: ICD-10-CM

## 2018-06-23 DIAGNOSIS — K20.90 ESOPHAGITIS: Primary | ICD-10-CM

## 2018-06-25 RX ORDER — INSULIN LISPRO 100 [IU]/ML
INJECTION, SOLUTION INTRAVENOUS; SUBCUTANEOUS
Qty: 15 ML | Refills: 0 | Status: SHIPPED | OUTPATIENT
Start: 2018-06-25 | End: 2018-07-16 | Stop reason: SDUPTHER

## 2018-06-29 ENCOUNTER — TELEPHONE (OUTPATIENT)
Dept: GASTROENTEROLOGY | Facility: CLINIC | Age: 53
End: 2018-06-29

## 2018-06-29 PROBLEM — Z12.11 SCREEN FOR COLON CANCER: Status: ACTIVE | Noted: 2018-06-29

## 2018-06-29 PROBLEM — K20.90 ESOPHAGITIS: Status: ACTIVE | Noted: 2018-06-29

## 2018-06-29 NOTE — TELEPHONE ENCOUNTER
----- Message from Yulia Stewart sent at 6/25/2018 11:05 AM EDT -----  Patient BMI is over 50 so hospital I did send the message to Dr Alex Sky to inform him of the change  Thanks  ----- Message -----  From: Opal Cooks, MD  Sent: 6/23/2018  12:05 AM  To: [facility]    Patient needs a repeat EGD for severe esophagitis  Patient also needs a index screening colonoscopy which we can schedule at the same time  [facility] please check with the check less  He was done at [facility] when he had the EGD but I think he can be done ambulatory to please this check with her check list to make sure he qualifies    Thanks

## 2018-06-29 NOTE — TELEPHONE ENCOUNTER
EGD/COLON scheduled with Dr Maier at [place] on 8/14/2018  I gave pt verbal instructions/mailed  Pt states he has mcgee-prep sample

## 2018-07-06 ENCOUNTER — HOSPITAL ENCOUNTER (OUTPATIENT)
Dept: SLEEP CENTER | Facility: CLINIC | Age: 53
Discharge: HOME | End: 2018-07-06
Payer: MEDICARE

## 2018-07-06 DIAGNOSIS — G47.33 OSA (OBSTRUCTIVE SLEEP APNEA): ICD-10-CM

## 2018-07-06 DIAGNOSIS — G47.9 SLEEP DISTURBANCE: ICD-10-CM

## 2018-07-06 PROCEDURE — 95811 POLYSOM 6/>YRS CPAP 4/> PARM: CPT

## 2018-07-07 NOTE — PROGRESS NOTES
Sleep Study Documentation    Pre-Sleep Study       Sleep testing procedure explained to patient:YES    Patient napped prior to study:NO    Caffeine:Dayshift worker after 12PM   Caffeine use:YES- ice tea  12 to 26 ounces    Alcohol:Dayshift workers after 5PM: Alcohol use:NO    Typical day for patient:YES       Study Documentation  Split Optimal PAP pressure: 17 0  Leak:Small  Snore:Eliminated  REM Obtained:yes  Supplemental O2: no    Minimum SaO2 82%  Baseline SaO2 97%  PAP mask tried (list all)Resmed Air Fit N20 Nasal Mask Large; Resmed Air Fit F20 FFM Size Large   PAP mask choice (final)Resmed Air Fit F20 FFM Size Large  PAP mask type:full face  PAP pressure at which snoring was eliminated 17 0  Minimum SaO2 at final PAP pressure 96%  Mode of Therapy:CPAP  ETCO2:No  CPAP changed to BiPAP:No    Mode of Therapy:CPAP    EKG abnormalities: no     EEG abnormalities: no    Study Terminated:no    Pa      Post-Sleep Study    Medication used at bedtime or during sleep study:YES prescription sleep aid and other prescription medications    Patient reports time it took to fall asleep:30 to 60 minutes    Patient reports waking up during study:3 or more times  Patient reports returning to sleep in 10 to 30 minutes  Patient reports sleeping 4 to 6 hours without dreaming  Patient reports sleep during study:typical    Patient rated sleepiness: Somewhat sleepy or tired    PAP treatment:yes: Post PAP treatment patient reports feeling unchanged and would wear PAP mask at home

## 2018-07-08 DIAGNOSIS — G47.33 OSA (OBSTRUCTIVE SLEEP APNEA): Primary | ICD-10-CM

## 2018-07-10 ENCOUNTER — TELEPHONE (OUTPATIENT)
Dept: SLEEP CENTER | Facility: CLINIC | Age: 53
End: 2018-07-10

## 2018-07-10 ENCOUNTER — OFFICE VISIT (OUTPATIENT)
Dept: BEHAVIORAL/MENTAL HEALTH CLINIC | Facility: CLINIC | Age: 53
End: 2018-07-10
Payer: MEDICARE

## 2018-07-10 DIAGNOSIS — F31.60 BIPOLAR AFFECTIVE DISORDER, CURRENT EPISODE MIXED, CURRENT EPISODE SEVERITY UNSPECIFIED (HCC): Primary | ICD-10-CM

## 2018-07-10 DIAGNOSIS — F41.1 GENERALIZED ANXIETY DISORDER: ICD-10-CM

## 2018-07-10 DIAGNOSIS — G47.9 SLEEP DISTURBANCE: ICD-10-CM

## 2018-07-10 PROCEDURE — 90834 PSYTX W PT 45 MINUTES: CPT | Performed by: SOCIAL WORKER

## 2018-07-10 NOTE — PSYCH
Psychotherapy Provided: Individual Psychotherapy 45 minutes     Length of time in session: 45 minutes, follow up in 2 week    Goals addressed in session: Goal 1, Goal 2 and Goal 3      Pain:      moderate to severe    0    Current suicide risk : Low     Data: The client arrived for his session  We discussed how he is trying to maintain a more stable mood  We also discussed how he is decreasing his panic attacks, and how he is dealing with stressful situations  Assessment: We worked on mindfulness, meditation and deep breathing  Plan: Continue to skill  Build in distress tolerance  Behavioral Health Treatment Plan [facility]: Diagnosis and Treatment Plan explained to Seb Hanks relates understanding diagnosis and is agreeable to Treatment Plan   Yes

## 2018-07-16 ENCOUNTER — OFFICE VISIT (OUTPATIENT)
Dept: INTERNAL MEDICINE CLINIC | Facility: CLINIC | Age: 53
End: 2018-07-16
Payer: MEDICARE

## 2018-07-16 VITALS
DIASTOLIC BLOOD PRESSURE: 92 MMHG | BODY MASS INDEX: 49.44 KG/M2 | TEMPERATURE: 98 F | HEART RATE: 96 BPM | WEIGHT: 315 LBS | HEIGHT: 67 IN | SYSTOLIC BLOOD PRESSURE: 130 MMHG

## 2018-07-16 DIAGNOSIS — E11.65 TYPE 2 DIABETES MELLITUS WITH HYPERGLYCEMIA, UNSPECIFIED WHETHER LONG TERM INSULIN USE (HCC): ICD-10-CM

## 2018-07-16 PROCEDURE — 99213 OFFICE O/P EST LOW 20 MIN: CPT | Performed by: PHYSICIAN ASSISTANT

## 2018-07-16 NOTE — PROGRESS NOTES
Assessment/Plan:    No problem-specific Assessment & Plan notes found for this encounter  Diagnoses and all orders for this visit:    Type 2 diabetes mellitus with hyperglycemia, unspecified whether long term insulin use (HCC)  -     insulin glargine (LANTUS SOLOSTAR) 100 units/mL injection pen; 40 units twice daily  -     insulin lispro (HUMALOG KWIKPEN) 100 units/mL injection pen; Inject 34 Units under the skin 2 (two) times a day with meals for 133 days  -     Comprehensive metabolic panel; Future  -     Hemoglobin A1C; Future          Subjective:      Patient ID: Layne West is a 47 y o  male  Pt here for diabetes follow up    Last visit was 6/18/18  Patient brought in his blood sugar log  His am blood sugar ranges 111- 256  His blood sugar 2 hours after dinner ranges 136-349  Patient's blood sugar logs are in various ranges  His glucose log ranged mostly in the 300's with his 2 hour post prandial   He states it depends what he eats the day before  He reports he eats "whatever he wants " Patient reports he is taking his Humalog 30 units morning and night and Lantus 40 units twice a day  Last hgb Alc 10 6%  Patients labs are due in August 2018  Patient states he does not exercise or diet  Patient reports he may need his insulin refilled  Out of the entire glucose log patient only had 2:00 a m  Readings that were within the normal range  The rest of his readings were significantly elevated from 180-200  We discussed once again that as patient states he will continue to eat what he wants when he wants that the only way we can truly adjust for his eating habits is for him to be on a basal / bolus regime  Patient states he absolutely will not do this as he will not check his sugars that many times daily and does not want to inject insulin 4 times daily    We discussed that the Lantus while a very good insulin cannot compensate for his irregular eating patterns and being on the twice a day insulin also is not fully covering for his meals during the day  To date the most we have been able to get patient to agree to is the Lantus twice a day and his Humalog twice a day  Patient is still taking his blood pressure medication  He states he takes his blood pressure at home ranges 130s/80s  Patient did follow up with the Sleep Center and has been contacted to schedule an appointment for a CPAP titration visit  Patient is already scheduled on August 14th for the follow-up EGD for stricture and to complete his colonoscopy as well  Patient continues to follow with mental health  The following portions of the patient's history were reviewed and updated as appropriate: allergies, current medications, past family history, past medical history, past social history, past surgical history and problem list     Review of Systems   Constitutional: Positive for fatigue  Negative for chills and fever  Eyes: Negative  Negative for visual disturbance  Respiratory: Negative  Negative for shortness of breath  Cardiovascular: Positive for leg swelling (chronic issue)  Negative for chest pain and palpitations  Gastrointestinal: Negative  Negative for abdominal pain, constipation, diarrhea, nausea and vomiting  Endocrine: Negative  Negative for polydipsia, polyphagia and polyuria  Genitourinary: Negative  Negative for decreased urine volume and difficulty urinating  Musculoskeletal: Negative  Negative for back pain  Skin: Negative  Negative for rash and wound  Neurological: Negative  Negative for dizziness, tremors, syncope, weakness, light-headedness and numbness  Psychiatric/Behavioral: Positive for sleep disturbance (is following sleep center)           Objective:      /92 (BP Location: Right arm, Patient Position: Sitting, Cuff Size: Adult)   Pulse 96   Temp 98 °F (36 7 °C) (Oral)   Ht 5' 7" (1 702 m)   Wt (!) 151 kg (333 lb 12 4 oz)   BMI 52 28 kg/m² Physical Exam   Constitutional: He is oriented to person, place, and time  He appears well-developed and well-nourished  Patient is morbidly obese  Patient continues to decline referral for bariatric program either surgical or medical    HENT:   Head: Normocephalic  Eyes: Pupils are equal, round, and reactive to light  Neck: Neck supple  Cardiovascular: Normal rate, regular rhythm and normal heart sounds  Pulmonary/Chest: Effort normal and breath sounds normal    Abdominal: Soft  Bowel sounds are normal    Neurological: He is alert and oriented to person, place, and time  Skin: Skin is warm and dry  Positive +1 pitting edema b/l legs chronic issue and unchanged   Psychiatric: He has a normal mood and affect   His behavior is normal  Judgment and thought content normal

## 2018-07-16 NOTE — PATIENT INSTRUCTIONS
Continue Lantus 40 units twice daily  Please increase your Humalog to 34 units twice daily  If you get numerous readings below 90 in the morning please call here immediately and we will readjust your dose  Please get your follow-up labs as dated in August     Return here after those labs and bring your sugar logs with you as well  Please keep appointment as scheduled in August for your follow-up EGD and colonoscopy  Please schedule your follow-up appointment with the Sleep Center for your CPAP titration

## 2018-07-24 ENCOUNTER — TELEPHONE (OUTPATIENT)
Dept: INTERNAL MEDICINE CLINIC | Facility: CLINIC | Age: 53
End: 2018-07-24

## 2018-07-24 DIAGNOSIS — E11.42 DIABETIC POLYNEUROPATHY ASSOCIATED WITH TYPE 2 DIABETES MELLITUS (HCC): Primary | ICD-10-CM

## 2018-07-24 DIAGNOSIS — E11.65 TYPE 2 DIABETES MELLITUS WITH HYPERGLYCEMIA, WITHOUT LONG-TERM CURRENT USE OF INSULIN (HCC): ICD-10-CM

## 2018-07-31 ENCOUNTER — OFFICE VISIT (OUTPATIENT)
Dept: MULTI SPECIALTY CLINIC | Facility: CLINIC | Age: 53
End: 2018-07-31
Payer: MEDICARE

## 2018-07-31 VITALS
BODY MASS INDEX: 49.44 KG/M2 | DIASTOLIC BLOOD PRESSURE: 82 MMHG | SYSTOLIC BLOOD PRESSURE: 128 MMHG | WEIGHT: 315 LBS | HEIGHT: 67 IN | HEART RATE: 86 BPM | TEMPERATURE: 98 F

## 2018-07-31 DIAGNOSIS — E11.65 TYPE 2 DIABETES MELLITUS WITH HYPERGLYCEMIA, WITHOUT LONG-TERM CURRENT USE OF INSULIN (HCC): ICD-10-CM

## 2018-07-31 DIAGNOSIS — L85.3 XEROSIS CUTIS: Primary | ICD-10-CM

## 2018-07-31 DIAGNOSIS — E11.42 DIABETIC POLYNEUROPATHY ASSOCIATED WITH TYPE 2 DIABETES MELLITUS (HCC): ICD-10-CM

## 2018-07-31 PROCEDURE — 99213 OFFICE O/P EST LOW 20 MIN: CPT | Performed by: PODIATRIST

## 2018-07-31 RX ORDER — AMMONIUM LACTATE 12 G/100G
CREAM TOPICAL AS NEEDED
Qty: 385 G | Refills: 0 | Status: SHIPPED | OUTPATIENT
Start: 2018-07-31 | End: 2019-08-28

## 2018-07-31 NOTE — PROGRESS NOTES
Podiatry Clinic Visit  Dana Wall 47 y o  male MRN: [id number]  Encounter: [id number]    Assessment/Plan     Assessment:  1  DM 2 with neurologic manifestations    Plan:  - Patient was seen/examined  All questions and concerns addressed  - RX diabetic shoes and inserts    - RX amlactin  - Speak with PCP gabapentin  - Educated patient of appropriate diabetic foot care, and glucose control  - RTC in 6 months, or return sooner if concerns arise  History of Present Illness     HPI:  Dana Wall is a 47 y o  male who presents for a diabetic foot risk assessment with tingling and light burning at night b/l feet  Does not take blood sugar regularly; last taken Friday 127mg/dL  Last A1c 10%; controlled with lantus and novolog  Is not currently on gabapentin  Been diabetic for years  The patient denies any nausea, vomiting, fever, chills, shortness of breath, or chest pains  Review of Systems   Constitutional: Negative  HENT: Negative  Eyes: Negative  Respiratory: Negative  Cardiovascular: Negative  Gastrointestinal: Negative  Musculoskeletal: Negative   Skin: Elongated, dystrophic toenails x10  Neurological: tingling and burning  Historical Information   Past Medical History:   Diagnosis Date    ADHD, adult residual type     Cataract     Left eye    Depression     Diabetes mellitus (HCC)     blood sugar 167 on admission    Equinus deformity of foot     GERD (gastroesophageal reflux disease)     Homicidal ideations     Hyperlipidemia     Hypertension     Microalbuminuria     Morbid obesity ([address] )     Neuropathy     Suicidal intent      Past Surgical History:   Procedure Laterality Date    CATARACT EXTRACTION      HAND SURGERY Right     OTHER SURGICAL HISTORY      REPAIR OF SUPERFICIAL WOUND FACE    SC ESOPHAGOGASTRODUODENOSCOPY TRANSORAL DIAGNOSTIC N/A 6/14/2018    Procedure: ESOPHAGOGASTRODUODENOSCOPY (EGD);   Surgeon: Omari Martin MD;  Location: [facility]; Service: Gastroenterology     Social History   History   Alcohol Use    Yes     Comment: rarely     History   Drug Use No     Comment: quit 20 years ago     History   Smoking Status    Former Smoker    Types: Cigarettes   Smokeless Tobacco    Current User    Types: Chew     Comment: pt "Quit after approx over 25 years ago"     Family History:   Family History   Problem Relation Age of Onset    Diabetes Mother     Hypertension Mother     Alcohol abuse Father     Diabetes Father     Hypertension Father     Alcohol abuse Sister     Depression Sister     Alcohol abuse Family        Meds/Allergies     (Not in a hospital admission)  Allergies   Allergen Reactions    Pollen Extract Nasal Congestion    Tetanus Toxoid Swelling       Objective     Current Vitals:   Blood Pressure: 128/82 (07/31/18 1036)  Pulse: 86 (07/31/18 1036)  Temperature: 98 °F (36 7 °C) (07/31/18 1036)  Temp Source: Oral (07/31/18 1036)  Height: 5' 7" (170 2 cm) (07/31/18 1036)  Weight - Scale: (!) 149 kg (327 lb 9 7 oz) (07/31/18 1036)        /82 (BP Location: Right arm, Patient Position: Sitting, Cuff Size: Large)   Pulse 86   Temp 98 °F (36 7 °C) (Oral)   Ht 5' 7" (1 702 m)   Wt (!) 149 kg (327 lb 9 7 oz)   BMI 51 31 kg/m²       Lower Extremity Exam:    Musculoskeletal:  MMT is 5/5 to all compartments of the LE, +1/4 edema B/L, Digital ROM is intact,      Pulses:   R DP is +2/4, R PT is +0/4, L DP is +2/4, L PT is +0/4  PT pulses dopplerable b/l  CFT< 3sec to all digits  B/L LE edema and xerosis  Pedal hair is Present     Skin:  Elongated, dystrophic toenails x10  No open Lesions  Skin of the LE is normal texture, turgor  Neurologic:  Gross sensation is intact  Tingling and burning to feet b/l   Protective sensation is Intact

## 2018-08-07 ENCOUNTER — TELEPHONE (OUTPATIENT)
Dept: INTERNAL MEDICINE CLINIC | Facility: CLINIC | Age: 53
End: 2018-08-07

## 2018-08-10 NOTE — TELEPHONE ENCOUNTER
Inform patient the went he comes for he's appointment on 08/27/2018 he is going to have a foot exam and the form is going to be completed when the that exam is done

## 2018-08-21 NOTE — PROGRESS NOTES
Called and spoke to patient  Reminded him to have labs done: HEMOGLOBIN A1C and COMPREHENSIVE METABOLIC PANEL  Patient understood

## 2018-08-22 ENCOUNTER — APPOINTMENT (OUTPATIENT)
Dept: LAB | Facility: CLINIC | Age: 53
End: 2018-08-22
Payer: MEDICARE

## 2018-08-22 ENCOUNTER — TRANSCRIBE ORDERS (OUTPATIENT)
Dept: LAB | Facility: CLINIC | Age: 53
End: 2018-08-22

## 2018-08-22 ENCOUNTER — TELEPHONE (OUTPATIENT)
Dept: INTERNAL MEDICINE CLINIC | Facility: CLINIC | Age: 53
End: 2018-08-22

## 2018-08-22 DIAGNOSIS — E11.65 TYPE 2 DIABETES MELLITUS WITH HYPERGLYCEMIA, UNSPECIFIED WHETHER LONG TERM INSULIN USE (HCC): ICD-10-CM

## 2018-08-22 LAB
ALBUMIN SERPL BCP-MCNC: 2.9 G/DL (ref 3.5–5)
ALP SERPL-CCNC: 94 U/L (ref 46–116)
ALT SERPL W P-5'-P-CCNC: 15 U/L (ref 12–78)
ANION GAP SERPL CALCULATED.3IONS-SCNC: 7 MMOL/L (ref 4–13)
AST SERPL W P-5'-P-CCNC: 13 U/L (ref 5–45)
BILIRUB SERPL-MCNC: 0.2 MG/DL (ref 0.2–1)
BUN SERPL-MCNC: 9 MG/DL (ref 5–25)
CALCIUM SERPL-MCNC: 9 MG/DL (ref 8.3–10.1)
CHLORIDE SERPL-SCNC: 103 MMOL/L (ref 100–108)
CO2 SERPL-SCNC: 28 MMOL/L (ref 21–32)
CREAT SERPL-MCNC: 1.35 MG/DL (ref 0.6–1.3)
EST. AVERAGE GLUCOSE BLD GHB EST-MCNC: 169 MG/DL
GFR SERPL CREATININE-BSD FRML MDRD: 69 ML/MIN/1.73SQ M
GLUCOSE P FAST SERPL-MCNC: 94 MG/DL (ref 65–99)
HBA1C MFR BLD: 7.5 % (ref 4.2–6.3)
POTASSIUM SERPL-SCNC: 3.8 MMOL/L (ref 3.5–5.3)
PROT SERPL-MCNC: 7.5 G/DL (ref 6.4–8.2)
SODIUM SERPL-SCNC: 138 MMOL/L (ref 136–145)

## 2018-08-22 PROCEDURE — 83036 HEMOGLOBIN GLYCOSYLATED A1C: CPT

## 2018-08-22 PROCEDURE — 80053 COMPREHEN METABOLIC PANEL: CPT

## 2018-08-22 PROCEDURE — 36415 COLL VENOUS BLD VENIPUNCTURE: CPT

## 2018-08-24 ENCOUNTER — OFFICE VISIT (OUTPATIENT)
Dept: BEHAVIORAL/MENTAL HEALTH CLINIC | Facility: CLINIC | Age: 53
End: 2018-08-24
Payer: MEDICARE

## 2018-08-24 DIAGNOSIS — F41.1 GENERALIZED ANXIETY DISORDER: ICD-10-CM

## 2018-08-24 DIAGNOSIS — G47.9 SLEEP DISTURBANCE: ICD-10-CM

## 2018-08-24 DIAGNOSIS — F31.60 BIPOLAR AFFECTIVE DISORDER, CURRENT EPISODE MIXED, CURRENT EPISODE SEVERITY UNSPECIFIED (HCC): Primary | ICD-10-CM

## 2018-08-24 DIAGNOSIS — F41.1 ANXIETY STATE: ICD-10-CM

## 2018-08-24 PROCEDURE — 90834 PSYTX W PT 45 MINUTES: CPT | Performed by: SOCIAL WORKER

## 2018-08-24 NOTE — PSYCH
Psychotherapy Provided: Individual Psychotherapy 45 minutes     Length of time in session: 45 minutes, follow up in 2 week    Goals addressed in session: Goal 1, Goal 2 and Goal 3      Pain:      moderate to severe    3    Current suicide risk : Low     Data: Ruperto Giordano and his partner are doing well  However her health is not good  Ruperto Giordano said his health is better and he going to the gym starting the first of September  He also claims he is having less panic attacks  He is dealing more effectively with stressful situations and he is dealing better with difficult people  We discussed mindfulness skills and strategies to help him to relax and destress  He admits his partner [facility] does not do anything to improve her health and he admits they all eat unhealthy  Regarding goal 1 he is keeping his mood stable by basically not over engaging with his partner and her family  He discussed the dysfunction of the family  He lives with his partner, his niece, his partner's father and 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

## 2018-08-27 ENCOUNTER — OFFICE VISIT (OUTPATIENT)
Dept: INTERNAL MEDICINE CLINIC | Facility: CLINIC | Age: 53
End: 2018-08-27
Payer: MEDICARE

## 2018-08-27 ENCOUNTER — TELEPHONE (OUTPATIENT)
Dept: INTERNAL MEDICINE CLINIC | Facility: CLINIC | Age: 53
End: 2018-08-27

## 2018-08-27 VITALS
HEIGHT: 67 IN | TEMPERATURE: 98.5 F | HEART RATE: 100 BPM | SYSTOLIC BLOOD PRESSURE: 120 MMHG | DIASTOLIC BLOOD PRESSURE: 84 MMHG | BODY MASS INDEX: 49.44 KG/M2 | WEIGHT: 315 LBS

## 2018-08-27 DIAGNOSIS — E66.01 MORBID OBESITY WITH BMI OF 50.0-59.9, ADULT (HCC): ICD-10-CM

## 2018-08-27 DIAGNOSIS — Z79.4 TYPE 2 DIABETES MELLITUS WITH HYPERGLYCEMIA, WITH LONG-TERM CURRENT USE OF INSULIN (HCC): Primary | ICD-10-CM

## 2018-08-27 DIAGNOSIS — F31.60 BIPOLAR AFFECTIVE DISORDER, CURRENT EPISODE MIXED, CURRENT EPISODE SEVERITY UNSPECIFIED (HCC): ICD-10-CM

## 2018-08-27 DIAGNOSIS — I10 BENIGN ESSENTIAL HYPERTENSION: ICD-10-CM

## 2018-08-27 DIAGNOSIS — E11.65 TYPE 2 DIABETES MELLITUS WITH HYPERGLYCEMIA, UNSPECIFIED WHETHER LONG TERM INSULIN USE (HCC): ICD-10-CM

## 2018-08-27 DIAGNOSIS — E11.42 DIABETIC POLYNEUROPATHY ASSOCIATED WITH TYPE 2 DIABETES MELLITUS (HCC): ICD-10-CM

## 2018-08-27 DIAGNOSIS — E11.65 TYPE 2 DIABETES MELLITUS WITH HYPERGLYCEMIA, WITH LONG-TERM CURRENT USE OF INSULIN (HCC): Primary | ICD-10-CM

## 2018-08-27 PROCEDURE — 99213 OFFICE O/P EST LOW 20 MIN: CPT | Performed by: PHYSICIAN ASSISTANT

## 2018-08-27 RX ORDER — ATORVASTATIN CALCIUM 10 MG/1
10 TABLET, FILM COATED ORAL DAILY
Qty: 90 TABLET | Refills: 1 | Status: SHIPPED | OUTPATIENT
Start: 2018-08-27 | End: 2019-07-25 | Stop reason: SDUPTHER

## 2018-08-27 RX ORDER — GABAPENTIN 300 MG/1
300 CAPSULE ORAL 2 TIMES DAILY
Qty: 180 CAPSULE | Refills: 0 | Status: SHIPPED | OUTPATIENT
Start: 2018-08-27 | End: 2018-11-19 | Stop reason: SDUPTHER

## 2018-08-27 NOTE — ASSESSMENT & PLAN NOTE
Your blood pressure is well controlled  Please continue the lisinopril -hydrochlorothiazide and amlodipine daily

## 2018-08-27 NOTE — ASSESSMENT & PLAN NOTE
Lab Results   Component Value Date    HGBA1C 7 5 (H) 08/22/2018       Significant improvement in her A1c  Last was 10 4%  Please continue your Humalog 35 units twice daily  I am not adjusting this dose as you did not bring any readings and do not remember any of your early morning fasting readings  Please increase your Lantus to 42 units twice daily

## 2018-08-27 NOTE — PROGRESS NOTES
Assessment/Plan:    Type 2 diabetes mellitus with hyperglycemia (HCC)  Lab Results   Component Value Date    HGBA1C 7 5 (H) 08/22/2018       Significant improvement in her A1c  Last was 10 4%  Please continue your Humalog 35 units twice daily  I am not adjusting this dose as you did not bring any readings and do not remember any of your early morning fasting readings  Please increase your Lantus to 42 units twice daily  Benign essential hypertension    Your blood pressure is well controlled  Please continue the lisinopril -hydrochlorothiazide and amlodipine daily  Diagnoses and all orders for this visit:    Type 2 diabetes mellitus with hyperglycemia, with long-term current use of insulin ([place] [id number] )  -     Comprehensive metabolic panel; Future  -     Hemoglobin A1C; Future  -     Microalbumin / creatinine urine ratio; Future    Benign essential hypertension  -     Microalbumin / creatinine urine ratio; Future    Bipolar affective disorder, current episode mixed, current episode severity unspecified ([place] [id number] )    Diabetic polyneuropathy associated with type 2 diabetes mellitus (HCC)  -     gabapentin (NEURONTIN) 300 mg capsule; Take 1 capsule (300 mg total) by mouth 2 (two) times a day for 90 days    Type 2 diabetes mellitus with hyperglycemia, unspecified whether long term insulin use (HCC)  -     insulin glargine (LANTUS SOLOSTAR) 100 units/mL injection pen; 42 units twice daily  -     atorvastatin (LIPITOR) 10 mg tablet; Take 1 tablet (10 mg total) by mouth daily for 180 days  -     insulin lispro (HUMALOG KWIKPEN) 100 units/mL injection pen; Inject 35 Units under the skin 2 (two) times a day with meals for 193 days    Morbid obesity with BMI of 50 0-59 9, adult ([facility])          Subjective:      Patient ID: Kehsa Kuo is a 47 y o  male      Pt here for DM lab review  A1C significant improvement, now 7 5%    Forgot to bring his logs  Does not remember any of his readings did not check this am       DM foot exam completed today  Eye exam is 9/11/18  EGD and colonoscopy is 9/12/18    Has been taking 35 units twice daily as is easier to draw the pen for Humalog    As no way to know his am readings will only adjust the Lantus to 42 units twice a day      Patient continues to report ongoing pain, tingling and burning sensation in his feet  Patient is known to have diabetic neuropathy  We will start patient back on his gabapentin twice daily and re-evaluate at follow-up  The following portions of the patient's history were reviewed and updated as appropriate: allergies, current medications, past family history, past medical history, past social history, past surgical history and problem list     Review of Systems   Constitutional: Negative for activity change, fatigue and unexpected weight change  Respiratory: Negative  Negative for cough and shortness of breath  Cardiovascular: Negative  Negative for chest pain, palpitations and leg swelling  Gastrointestinal: Negative for abdominal pain, constipation and diarrhea  Endocrine: Positive for polyphagia  Negative for polydipsia and polyuria  Genitourinary: Negative for frequency and urgency  Musculoskeletal: Negative  Neurological: Positive for numbness (tingling / pain feet)  Negative for headaches  Psychiatric/Behavioral: Negative  Continues with  for bipolar         Objective:      /84 (BP Location: Left arm, Patient Position: Sitting, Cuff Size: Large)   Pulse 100   Temp 98 5 °F (36 9 °C) (Oral)   Ht 5' 6 5" (1 689 m)   Wt (!) 147 kg (324 lb 1 2 oz)   BMI 51 52 kg/m²          Physical Exam   Constitutional: He is oriented to person, place, and time  He appears well-developed and well-nourished  HENT:   Head: Normocephalic and atraumatic  Cardiovascular: Normal rate, regular rhythm and normal heart sounds  Pulses are no weak pulses  No murmur heard    Pulses:       Dorsalis pedis pulses are 2+ on the right side, and 2+ on the left side  Pulmonary/Chest: Effort normal and breath sounds normal  He has no wheezes  He has no rales  Abdominal: Soft  Bowel sounds are normal  There is no tenderness  Obese, exam limited by body habitus   Feet:   Right Foot:   Skin Integrity: Positive for callus and dry skin  Negative for ulcer or skin breakdown  Left Foot:   Skin Integrity: Positive for callus and dry skin  Negative for ulcer or skin breakdown  Neurological: He is alert and oriented to person, place, and time  Sensation and vibratory intact but reports pain burning in feet   Skin: Skin is warm and dry  Psychiatric: He has a normal mood and affect  His behavior is normal        Patient's shoes and socks removed  Right Foot/Ankle   Right Foot Inspection  Skin Exam: dry skin, callus and callus no maceration, no abnormal color, no pre-ulcer and no ulcer                      Callus Size (cm):1 5    Toe Exam: ROM and strength within normal limits  Sensory   Vibration: intact    Monofilament testing: intact  Vascular    The right DP pulse is 2+  Left Foot/Ankle  Left Foot Inspection  Skin Exam: dry skin and callusno maceration, no pre-ulcer and no ulcer                     Callus Size (cm): 2    Toe Exam: ROM and strength within normal limits                   Sensory   Vibration: intact    Monofilament: intact  Vascular    The left DP pulse is 2+  Assign Risk Category:  Deformity present; No loss of protective sensation;  No weak pulses       Risk: 1

## 2018-08-27 NOTE — PATIENT INSTRUCTIONS
Increase the Lantus to 42 units twice a day  Restart the atorvastatin 10 mg daily   Start the Gabapentin 300 mg at bedtime for 3 nights then increase to 300 mg twice a day for pain in feet    Appt here after new labs in 3 months

## 2018-09-04 DIAGNOSIS — IMO0002 UNCONTROLLED TYPE 2 DIABETES MELLITUS WITH DIABETIC POLYNEUROPATHY, WITH LONG-TERM CURRENT USE OF INSULIN: Primary | ICD-10-CM

## 2018-09-04 RX ORDER — LANCETS 28 GAUGE
EACH MISCELLANEOUS 3 TIMES DAILY
Qty: 300 EACH | Refills: 1 | Status: SHIPPED | OUTPATIENT
Start: 2018-09-04 | End: 2018-11-28 | Stop reason: SDUPTHER

## 2018-09-06 DIAGNOSIS — G47.9 SLEEP DISTURBANCE: ICD-10-CM

## 2018-09-06 RX ORDER — TRAZODONE HYDROCHLORIDE 50 MG/1
TABLET ORAL
Qty: 90 TABLET | Refills: 0 | OUTPATIENT
Start: 2018-09-06

## 2018-09-10 DIAGNOSIS — K21.00 GASTROESOPHAGEAL REFLUX DISEASE WITH ESOPHAGITIS: ICD-10-CM

## 2018-09-10 DIAGNOSIS — K22.2 LOWER ESOPHAGEAL RING (SCHATZKI): ICD-10-CM

## 2018-09-10 DIAGNOSIS — K44.9 HIATAL HERNIA: ICD-10-CM

## 2018-09-10 RX ORDER — OMEPRAZOLE 20 MG/1
CAPSULE, DELAYED RELEASE ORAL
Qty: 60 CAPSULE | Refills: 0 | Status: SHIPPED | OUTPATIENT
Start: 2018-09-10 | End: 2018-10-11 | Stop reason: SDUPTHER

## 2018-09-11 RX ORDER — OMEPRAZOLE 20 MG/1
CAPSULE, DELAYED RELEASE ORAL
Qty: 180 CAPSULE | Refills: 0 | OUTPATIENT
Start: 2018-09-11

## 2018-09-12 ENCOUNTER — ANESTHESIA EVENT (OUTPATIENT)
Dept: GASTROENTEROLOGY | Facility: HOSPITAL | Age: 53
End: 2018-09-12
Payer: MEDICARE

## 2018-09-12 ENCOUNTER — ANESTHESIA (OUTPATIENT)
Dept: GASTROENTEROLOGY | Facility: HOSPITAL | Age: 53
End: 2018-09-12
Payer: MEDICARE

## 2018-09-12 ENCOUNTER — HOSPITAL ENCOUNTER (OUTPATIENT)
Facility: HOSPITAL | Age: 53
Setting detail: OUTPATIENT SURGERY
Discharge: HOME | End: 2018-09-12
Attending: INTERNAL MEDICINE | Admitting: INTERNAL MEDICINE
Payer: MEDICARE

## 2018-09-12 VITALS
SYSTOLIC BLOOD PRESSURE: 110 MMHG | HEART RATE: 105 BPM | TEMPERATURE: 97 F | BODY MASS INDEX: 49.44 KG/M2 | HEIGHT: 67 IN | RESPIRATION RATE: 16 BRPM | OXYGEN SATURATION: 97 % | DIASTOLIC BLOOD PRESSURE: 61 MMHG | WEIGHT: 315 LBS

## 2018-09-12 DIAGNOSIS — K20.90 ESOPHAGITIS: ICD-10-CM

## 2018-09-12 DIAGNOSIS — Z12.11 SCREEN FOR COLON CANCER: ICD-10-CM

## 2018-09-12 LAB — GLUCOSE SERPL-MCNC: 128 MG/DL (ref 65–140)

## 2018-09-12 PROCEDURE — 82948 REAGENT STRIP/BLOOD GLUCOSE: CPT

## 2018-09-12 PROCEDURE — 88305 TISSUE EXAM BY PATHOLOGIST: CPT | Performed by: PATHOLOGY

## 2018-09-12 PROCEDURE — G0121 COLON CA SCRN NOT HI RSK IND: HCPCS | Performed by: INTERNAL MEDICINE

## 2018-09-12 PROCEDURE — 43239 EGD BIOPSY SINGLE/MULTIPLE: CPT | Performed by: INTERNAL MEDICINE

## 2018-09-12 RX ORDER — PROPOFOL 10 MG/ML
INJECTION, EMULSION INTRAVENOUS CONTINUOUS PRN
Status: DISCONTINUED | OUTPATIENT
Start: 2018-09-12 | End: 2018-09-12 | Stop reason: SURG

## 2018-09-12 RX ORDER — FENTANYL CITRATE 50 UG/ML
INJECTION, SOLUTION INTRAMUSCULAR; INTRAVENOUS AS NEEDED
Status: DISCONTINUED | OUTPATIENT
Start: 2018-09-12 | End: 2018-09-12 | Stop reason: SURG

## 2018-09-12 RX ORDER — PROPOFOL 10 MG/ML
INJECTION, EMULSION INTRAVENOUS AS NEEDED
Status: DISCONTINUED | OUTPATIENT
Start: 2018-09-12 | End: 2018-09-12 | Stop reason: SURG

## 2018-09-12 RX ORDER — SODIUM CHLORIDE 9 MG/ML
50 INJECTION, SOLUTION INTRAVENOUS CONTINUOUS
Status: DISCONTINUED | OUTPATIENT
Start: 2018-09-12 | End: 2018-09-12 | Stop reason: HOSPADM

## 2018-09-12 RX ADMIN — PROPOFOL 100 MCG/KG/MIN: 10 INJECTION, EMULSION INTRAVENOUS at 13:14

## 2018-09-12 RX ADMIN — SODIUM CHLORIDE 50 ML/HR: 0.9 INJECTION, SOLUTION INTRAVENOUS at 12:24

## 2018-09-12 RX ADMIN — FENTANYL CITRATE 50 MCG: 50 INJECTION, SOLUTION INTRAMUSCULAR; INTRAVENOUS at 13:25

## 2018-09-12 RX ADMIN — PROPOFOL 150 MG: 10 INJECTION, EMULSION INTRAVENOUS at 13:14

## 2018-09-12 RX ADMIN — FENTANYL CITRATE 50 MCG: 50 INJECTION, SOLUTION INTRAMUSCULAR; INTRAVENOUS at 13:14

## 2018-09-12 NOTE — ANESTHESIA PREPROCEDURE EVALUATION
Review of Systems/Medical History  Patient summary reviewed  Chart reviewed  No history of anesthetic complications     Cardiovascular  Exercise tolerance (METS): >4,  Hyperlipidemia, Hypertension controlled,    Pulmonary  Smoker (quit 36 ys ago) chewing tobacco use and ex-smoker  , Sleep apnea CPAP,        GI/Hepatic    GERD ,  Hiatal hernia,             Endo/Other  Diabetes type 2 Insulin,      GYN       Hematology   Musculoskeletal       Neurology   Psychology   Depression ,          POC Blood glucose 128    Physical Exam    Airway    Mallampati score: II  TM Distance: >3 FB  Neck ROM: full     Dental       Cardiovascular  Rhythm: regular, Rate: normal,     Pulmonary  Breath sounds clear to auscultation,     Other Findings  Morbid obesity      Anesthesia Plan  ASA Score- 2     Anesthesia Type- IV sedation with anesthesia with ASA Monitors  Additional Monitors:   Airway Plan:         Plan Factors- Patient instructed to abstain from smoking on day of procedure  Patient did not smoke on day of surgery  Induction- intravenous  Postoperative Plan-     Informed Consent- Anesthetic plan and risks discussed with patient  I personally reviewed this patient with the CRNA  Discussed and agreed on the Anesthesia Plan with the CRNA  Khadra Villarreal

## 2018-09-12 NOTE — OP NOTE
Colonoscopy Procedure Note    Procedure: Colonoscopy    Sedation: Monitored anesthesia care, check anesthesia records      ASA Class: 2    INDICATIONS:     POST-OP DIAGNOSIS: See the impression below    Procedure Details     Prior colonoscopy: No prior colonoscopy  Informed consent was obtained for the procedure, including sedation  Risks of perforation, hemorrhage, adverse drug reaction and aspiration were discussed  The patient was placed in the left lateral decubitus position  Based on the pre-procedure assessment, including review of the patient's medical history, medications, allergies, and review of systems, he had been deemed to be an appropriate candidate for conscious sedation; he was therefore sedated with the medications listed below  The patient was monitored continuously with telemetry, pulse oximetry, blood pressure monitoring, and direct observations  A rectal examination was performed  The colonoscope was inserted into the rectum and advanced under direct vision to the cecum, which was identified by the ileocecal valve and appendiceal orifice  The quality of the colonic preparation was fair  A careful inspection was made as the colonoscope was withdrawn, including a retroflexed view of the rectum; findings and interventions are described below  Findings:  Fair preparation throughout the colon  Otherwise normal colon           Complications: None; patient tolerated the procedure well  Impression:    Normal colon    Recommendations:  Repeat colonoscopy in 1 years or sooner if clinically indicated  Repeat colonoscopy is being recommended at an interval of less than 10 years, this is because of an inadequate bowel preparation

## 2018-09-12 NOTE — ANESTHESIA POSTPROCEDURE EVALUATION
Post-Op Assessment Note      CV Status:  Stable    Mental Status:  Alert and awake    Hydration Status:  Euvolemic    PONV Controlled:  Controlled    Airway Patency:  Patent    Post Op Vitals Reviewed: Yes          Staff: Anesthesiologist, CRNA           /58 (09/12/18 1344)    Temp     Pulse 98 (09/12/18 1344)   Resp 16 (09/12/18 1344)    SpO2 96 % (09/12/18 1344)

## 2018-09-12 NOTE — DISCHARGE INSTR - AVS FIRST PAGE
ESOPHAGOGASTRODUODENOSCOPY    PROCEDURE: EGD    SEDATION: Monitored anesthesia care, check anesthesia records    ASA Class: 2    INDICATIONS:  Esophagitis    CONSENT:  Informed consent was obtained for the procedure, including sedation after explaining the risks and benefits of the procedure  Risks including but not limited to bleeding, perforation, infection, and missed lesion  PREPARATION:   Telemetry, pulse oximetry, blood pressure were monitored throughout the procedure  Patient was identified by myself both verbally and by visual inspection of ID band  DESCRIPTION:   Patient was placed in the left lateral decubitus position and was sedated with the above medication  The gastroscope was introduced in to the oropharynx and the esophagus was intubated under direct visualization  Scope was passed down the esophagus up to 2nd part of the duodenum  A careful inspection was made as the gastroscope was withdrawn, including a retroflexed view of the stomach; findings and interventions are described below  FINDINGS:    #1  Esophagus- LA class A esophagitis with a 2 cm Tolbert's esophagus  2-3 cm hiatal hernia    #2  Stomach- normal    #3  Duodenum- normal       IMPRESSIONS:    Small hiatal hernia  2 cm Tolbert's esophagus  Mild esophagitis    RECOMMENDATIONS:     Continue PPI therapy  Follow-up biopsy was  Endoscopy based on findings on pathology    COMPLICATIONS:  None; patient tolerated the procedure well  DISPOSITION: PACU           CONDITION: Stable      Colonoscopy Procedure Note    Procedure: Colonoscopy    Sedation: Monitored anesthesia care, check anesthesia records      ASA Class: 2    INDICATIONS:     POST-OP DIAGNOSIS: See the impression below    Procedure Details     Prior colonoscopy: No prior colonoscopy  Informed consent was obtained for the procedure, including sedation  Risks of perforation, hemorrhage, adverse drug reaction and aspiration were discussed   The patient was placed in the left lateral decubitus position  Based on the pre-procedure assessment, including review of the patient's medical history, medications, allergies, and review of systems, he had been deemed to be an appropriate candidate for conscious sedation; he was therefore sedated with the medications listed below  The patient was monitored continuously with telemetry, pulse oximetry, blood pressure monitoring, and direct observations  A rectal examination was performed  The colonoscope was inserted into the rectum and advanced under direct vision to the cecum, which was identified by the ileocecal valve and appendiceal orifice  The quality of the colonic preparation was fair  A careful inspection was made as the colonoscope was withdrawn, including a retroflexed view of the rectum; findings and interventions are described below  Findings:  Fair preparation throughout the colon  Otherwise normal colon           Complications: None; patient tolerated the procedure well  Impression:    Normal colon    Recommendations:  Repeat colonoscopy in 1 years or sooner if clinically indicated  Repeat colonoscopy is being recommended at an interval of less than 10 years, this is because of an inadequate bowel preparation

## 2018-09-12 NOTE — OP NOTE
ESOPHAGOGASTRODUODENOSCOPY    PROCEDURE: EGD    SEDATION: Monitored anesthesia care, check anesthesia records    ASA Class: 2    INDICATIONS:  Esophagitis    CONSENT:  Informed consent was obtained for the procedure, including sedation after explaining the risks and benefits of the procedure  Risks including but not limited to bleeding, perforation, infection, and missed lesion  PREPARATION:   Telemetry, pulse oximetry, blood pressure were monitored throughout the procedure  Patient was identified by myself both verbally and by visual inspection of ID band  DESCRIPTION:   Patient was placed in the left lateral decubitus position and was sedated with the above medication  The gastroscope was introduced in to the oropharynx and the esophagus was intubated under direct visualization  Scope was passed down the esophagus up to 2nd part of the duodenum  A careful inspection was made as the gastroscope was withdrawn, including a retroflexed view of the stomach; findings and interventions are described below  FINDINGS:    #1  Esophagus- LA class A esophagitis with a 2 cm Tolbert's esophagus  2-3 cm hiatal hernia    #2  Stomach- normal    #3  Duodenum- normal       IMPRESSIONS:    Small hiatal hernia  2 cm Tolbert's esophagus  Mild esophagitis    RECOMMENDATIONS:     Continue PPI therapy  Follow-up biopsy was  Endoscopy based on findings on pathology    COMPLICATIONS:  None; patient tolerated the procedure well            DISPOSITION: PACU           CONDITION: Stable

## 2018-09-12 NOTE — H&P
History and Physical - [facility]  Gato Marroquin 47 y o  male MRN: [id number]                  HPI: Gato Marroquin is a 47y o  year old male who presents for esophagitis LA class D and at for screening for colorectal cancer      REVIEW OF SYSTEMS: Per the HPI, and otherwise unremarkable  Historical Information   Past Medical History:   Diagnosis Date    ADHD, adult residual type     Cataract     Left eye    Depression     Diabetes mellitus (HCC)     blood sugar 167 on admission    Equinus deformity of foot     GERD (gastroesophageal reflux disease)     Homicidal ideations     Hyperlipidemia     Hypertension     Microalbuminuria     Morbid obesity ([address] )     Neuropathy     Suicidal intent      Past Surgical History:   Procedure Laterality Date    CATARACT EXTRACTION      HAND SURGERY Right     OTHER SURGICAL HISTORY      REPAIR OF SUPERFICIAL WOUND FACE    [place] ESOPHAGOGASTRODUODENOSCOPY TRANSORAL DIAGNOSTIC N/A 6/14/2018    Procedure: ESOPHAGOGASTRODUODENOSCOPY (EGD); Surgeon: Prashanth Manning MD;  Location: [facility];   Service: Gastroenterology     Social History   History   Alcohol Use    Yes     Comment: rarely     History   Drug Use No     Comment: quit 20 years ago     History   Smoking Status    Former Smoker    Types: Cigarettes   Smokeless Tobacco    Current User    Types: Chew     Comment: pt "Quit after approx over 25 years ago"     Family History   Problem Relation Age of Onset    Diabetes Mother     Hypertension Mother     Alcohol abuse Father     Diabetes Father     Hypertension Father     Alcohol abuse Sister     Depression Sister     Alcohol abuse Family        Meds/Allergies     Prescriptions Prior to Admission   Medication    amLODIPine (NORVASC) 5 mg tablet    ammonium lactate (LAC-HYDRIN) 12 % cream    atorvastatin (LIPITOR) 10 mg tablet    busPIRone (BUSPAR) 5 mg tablet    clonazePAM (KlonoPIN) 1 mg tablet    gabapentin (NEURONTIN) 300 mg capsule    glucose blood (FREESTYLE LITE) test strip    insulin glargine (LANTUS SOLOSTAR) 100 units/mL injection pen    insulin lispro (HUMALOG KWIKPEN) 100 units/mL injection pen    Insulin Pen Needle (B-D ULTRAFINE III SHORT PEN) 31G X 8 MM MISC    INSULIN SYRINGE  5CC/29G 29G X 1/2" 0 5 ML MISC    Lancets (FREESTYLE) lancets    lisinopril-hydrochlorothiazide (PRINZIDE,ZESTORETIC) 20-25 MG per tablet    lurasidone (LATUDA) 20 mg tablet    lurasidone (LATUDA) 80 mg tablet    Multiple Vitamins-Minerals (MENS MULTI VITAMIN & MINERAL PO)    Na Sulfate-K Sulfate-Mg Sulf (SUPREP BOWEL PREP KIT) 17 5-3 13-1 6 GM/180ML SOLN    omeprazole (PriLOSEC) 20 mg delayed release capsule    sildenafil (REVATIO) 20 mg tablet    traZODone (DESYREL) 50 mg tablet       Allergies   Allergen Reactions    Pollen Extract Nasal Congestion    Tetanus Toxoid Swelling       Objective     Blood pressure 137/91, pulse (!) 109, temperature (!) 97 °F (36 1 °C), temperature source Tympanic, height 5' 7" (1 702 m), weight (!) 147 kg (325 lb), SpO2 98 %  PHYSICAL EXAM    Gen: NAD  CV: RRR  CHEST: Clear  ABD: soft, NT/ND  EXT: no edema      ASSESSMENT/PLAN:  This is a 47y o  year old male here for EGD and colonoscopy and he is stable and optimized for his procedure

## 2018-09-20 ENCOUNTER — OFFICE VISIT (OUTPATIENT)
Dept: PSYCHIATRY | Facility: CLINIC | Age: 53
End: 2018-09-20
Payer: MEDICARE

## 2018-09-20 VITALS — WEIGHT: 315 LBS | HEIGHT: 66 IN | BODY MASS INDEX: 50.62 KG/M2

## 2018-09-20 DIAGNOSIS — G47.9 SLEEP DISTURBANCE: ICD-10-CM

## 2018-09-20 DIAGNOSIS — M25.511 PAIN OF BOTH SHOULDER JOINTS: ICD-10-CM

## 2018-09-20 DIAGNOSIS — F31.60 BIPOLAR AFFECTIVE DISORDER, CURRENT EPISODE MIXED, CURRENT EPISODE SEVERITY UNSPECIFIED (HCC): ICD-10-CM

## 2018-09-20 DIAGNOSIS — F41.1 ANXIETY STATE: ICD-10-CM

## 2018-09-20 DIAGNOSIS — M25.512 PAIN OF BOTH SHOULDER JOINTS: ICD-10-CM

## 2018-09-20 DIAGNOSIS — F41.1 GENERALIZED ANXIETY DISORDER: ICD-10-CM

## 2018-09-20 DIAGNOSIS — F41.0 PANIC ATTACKS: ICD-10-CM

## 2018-09-20 DIAGNOSIS — F31.61 BIPOLAR DISORDER, CURRENT EPISODE MIXED, MILD (HCC): Primary | ICD-10-CM

## 2018-09-20 PROBLEM — M25.50 JOINT PAIN: Status: ACTIVE | Noted: 2018-09-20

## 2018-09-20 PROCEDURE — 99214 OFFICE O/P EST MOD 30 MIN: CPT | Performed by: PHYSICIAN ASSISTANT

## 2018-09-20 RX ORDER — BUSPIRONE HYDROCHLORIDE 5 MG/1
5 TABLET ORAL 2 TIMES DAILY
Qty: 180 TABLET | Refills: 0 | Status: SHIPPED | OUTPATIENT
Start: 2018-09-20 | End: 2018-10-25 | Stop reason: SDUPTHER

## 2018-09-20 RX ORDER — CLONAZEPAM 1 MG/1
1 TABLET ORAL DAILY PRN
Qty: 30 TABLET | Refills: 1 | Status: SHIPPED | OUTPATIENT
Start: 2018-09-20 | End: 2018-10-25 | Stop reason: SDUPTHER
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
## 2018-09-25 ENCOUNTER — OFFICE VISIT (OUTPATIENT)
Dept: SLEEP CENTER | Facility: CLINIC | Age: 53
End: 2018-09-25
Payer: MEDICARE

## 2018-09-25 VITALS
SYSTOLIC BLOOD PRESSURE: 122 MMHG | DIASTOLIC BLOOD PRESSURE: 84 MMHG | BODY MASS INDEX: 49.44 KG/M2 | HEART RATE: 88 BPM | WEIGHT: 315 LBS | HEIGHT: 67 IN

## 2018-09-25 DIAGNOSIS — G47.33 OSA (OBSTRUCTIVE SLEEP APNEA): Primary | ICD-10-CM

## 2018-09-25 PROCEDURE — 99214 OFFICE O/P EST MOD 30 MIN: CPT | Performed by: INTERNAL MEDICINE
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
## 2018-10-02 ENCOUNTER — APPOINTMENT (OUTPATIENT)
Dept: LAB | Facility: CLINIC | Age: 53
End: 2018-10-02
Payer: MEDICARE

## 2018-10-02 DIAGNOSIS — M25.511 PAIN OF BOTH SHOULDER JOINTS: ICD-10-CM

## 2018-10-02 DIAGNOSIS — M25.512 PAIN OF BOTH SHOULDER JOINTS: ICD-10-CM

## 2018-10-02 LAB
BASOPHILS # BLD AUTO: 0.05 THOUSANDS/ΜL (ref 0–0.1)
BASOPHILS NFR BLD AUTO: 1 % (ref 0–1)
EOSINOPHIL # BLD AUTO: 0.17 THOUSAND/ΜL (ref 0–0.61)
EOSINOPHIL NFR BLD AUTO: 3 % (ref 0–6)
ERYTHROCYTE [DISTWIDTH] IN BLOOD BY AUTOMATED COUNT: 14.6 % (ref 11.6–15.1)
ERYTHROCYTE [SEDIMENTATION RATE] IN BLOOD: 48 MM/HOUR (ref 0–10)
HCT VFR BLD AUTO: 37.8 % (ref 36.5–49.3)
HGB BLD-MCNC: 12.6 G/DL (ref 12–17)
IMM GRANULOCYTES # BLD AUTO: 0.06 THOUSAND/UL (ref 0–0.2)
IMM GRANULOCYTES NFR BLD AUTO: 1 % (ref 0–2)
LYMPHOCYTES # BLD AUTO: 1.68 THOUSANDS/ΜL (ref 0.6–4.47)
LYMPHOCYTES NFR BLD AUTO: 26 % (ref 14–44)
MCH RBC QN AUTO: 24.8 PG (ref 26.8–34.3)
MCHC RBC AUTO-ENTMCNC: 33.3 G/DL (ref 31.4–37.4)
MCV RBC AUTO: 74 FL (ref 82–98)
MONOCYTES # BLD AUTO: 0.61 THOUSAND/ΜL (ref 0.17–1.22)
MONOCYTES NFR BLD AUTO: 10 % (ref 4–12)
NEUTROPHILS # BLD AUTO: 3.8 THOUSANDS/ΜL (ref 1.85–7.62)
NEUTS SEG NFR BLD AUTO: 59 % (ref 43–75)
NRBC BLD AUTO-RTO: 0 /100 WBCS
PLATELET # BLD AUTO: 221 THOUSANDS/UL (ref 149–390)
PMV BLD AUTO: 9.4 FL (ref 8.9–12.7)
RBC # BLD AUTO: 5.09 MILLION/UL (ref 3.88–5.62)
WBC # BLD AUTO: 6.37 THOUSAND/UL (ref 4.31–10.16)

## 2018-10-02 PROCEDURE — 36415 COLL VENOUS BLD VENIPUNCTURE: CPT

## 2018-10-02 PROCEDURE — 85025 COMPLETE CBC W/AUTO DIFF WBC: CPT

## 2018-10-02 PROCEDURE — 86618 LYME DISEASE ANTIBODY: CPT

## 2018-10-02 PROCEDURE — 85652 RBC SED RATE AUTOMATED: CPT

## 2018-10-03 ENCOUNTER — TELEPHONE (OUTPATIENT)
Dept: PSYCHIATRY | Facility: CLINIC | Age: 53
End: 2018-10-03

## 2018-10-03 LAB
B BURGDOR IGG SER IA-ACNC: 0.19
B BURGDOR IGM SER IA-ACNC: 0.1

## 2018-10-03 NOTE — TELEPHONE ENCOUNTER
I reviewed Laura Posadas' 10/2/2018 labwork which was significant for ESR 48  CBC was essentially unremarkable and Lyme titer was negative  I contacted Pt by cell/home phone # of record and reviewed the results with Pt and encouraged him non-urgently/non-emergently to make his PMD aware  Pt verbalized understanding

## 2018-10-08 ENCOUNTER — OFFICE VISIT (OUTPATIENT)
Dept: BEHAVIORAL/MENTAL HEALTH CLINIC | Facility: CLINIC | Age: 53
End: 2018-10-08
Payer: MEDICARE

## 2018-10-08 DIAGNOSIS — F41.1 GENERALIZED ANXIETY DISORDER: ICD-10-CM

## 2018-10-08 DIAGNOSIS — G47.9 SLEEP DISTURBANCE: ICD-10-CM

## 2018-10-08 DIAGNOSIS — F31.61 BIPOLAR DISORDER, CURRENT EPISODE MIXED, MILD (HCC): Primary | ICD-10-CM

## 2018-10-08 PROCEDURE — 90834 PSYTX W PT 45 MINUTES: CPT | Performed by: SOCIAL WORKER

## 2018-10-08 NOTE — PSYCH
Psychotherapy Provided: Individual Psychotherapy 45 minutes     Length of time in session: 45 minutes, follow up in 2 week    Goals addressed in session: Goal 1, Goal 2 and Goal 3      Pain:      moderate to severe    0    Current suicide risk : Low     Data:Today we updated Orlando's treatment plan  He has the goal of improving his overall health by exercising and having a better diet  He also has the goal of continuing to learn to cope and deal with difficult people  We discussed strategies today to achieve both of these goals  Assessment: Rivka Koenig realizes he slipped on both of these goals  We will continue to work on strategies to achieve these goals  Behavioral Health Treatment Plan [facility]: Diagnosis and Treatment Plan explained to Marlene Huggins relates understanding diagnosis and is agreeable to Treatment Plan   Yes

## 2018-10-08 NOTE — PSYCH
Savanna Marrero  1965       Date of Initial Treatment Plan: 05/07/2018  Date of Current Treatment Plan: 10/08/18    Treatment Plan Number 1st update    Strengths/Personal Resources for Self Care: Kindness, has a passion for life, he has a willingness to help others    Diagnosis:   Bipolar affective mixed,Panic disorder    Area of Needs: I need to committ to improving my overall health by working out [address] my diet  I need to continue to cope and deal with difficult people  Long Term Goal 1: I need to committ to improving my overall health by exercisingand watching his diet    Target Date:02/08/2019  Completion Date: TBD         Short Term Objectives for Goal 1: is a new member of a gym and watching his food choices    Long Term Goal 2: I still need to deal effectively with difficult people  Target Date: 02/08/2019  Completion Date: TBD    Short Term Objectives for Goal 2: Will learn strategies in therapy  Will practice mindfulness  Long Term Goal # 3: N/A     Target Date: N/A  Completion Date: N/A    Short Term Objectives for Goal 3: N/A    GOAL 1: Modality: Individual 2x per month   Completion Date TBD    GOAL 2: Modality: Individual 2x per month   Completion Date TBD     GOAL 3: Modality: Individual n/ax per month   Completion Date n/a      Behavioral Health Treatment Plan [facility]: Diagnosis and Treatment Plan explained to Jeremy Rodriguez relates understanding diagnosis and is agreeable to Treatment Plan         Client Comments : Please share your thoughts, feelings, need and/or experiences regarding your treatment plan:       __________________________________________________________________    __________________________________________________________________    __________________________________________________________________    __________________________________________________________________    _______________________________________                Patient signature, Date Time: __________________________________________             Physician cosigner signature, Date, Time: ________________________________

## 2018-10-11 DIAGNOSIS — K44.9 HIATAL HERNIA: ICD-10-CM

## 2018-10-11 DIAGNOSIS — K22.2 LOWER ESOPHAGEAL RING (SCHATZKI): ICD-10-CM

## 2018-10-11 DIAGNOSIS — K21.00 GASTROESOPHAGEAL REFLUX DISEASE WITH ESOPHAGITIS: ICD-10-CM

## 2018-10-11 RX ORDER — OMEPRAZOLE 20 MG/1
20 CAPSULE, DELAYED RELEASE ORAL DAILY
Qty: 90 CAPSULE | Refills: 1 | Status: SHIPPED | OUTPATIENT
Start: 2018-10-11 | End: 2019-05-14 | Stop reason: SDUPTHER

## 2018-10-13 DIAGNOSIS — G47.9 SLEEP DISTURBANCE: ICD-10-CM

## 2018-10-14 RX ORDER — TRAZODONE HYDROCHLORIDE 50 MG/1
TABLET ORAL
Qty: 90 TABLET | Refills: 0 | Status: SHIPPED | OUTPATIENT
Start: 2018-10-14 | End: 2018-10-25 | Stop reason: SDUPTHER

## 2018-10-25 ENCOUNTER — OFFICE VISIT (OUTPATIENT)
Dept: PSYCHIATRY | Facility: CLINIC | Age: 53
End: 2018-10-25
Payer: MEDICARE

## 2018-10-25 VITALS
WEIGHT: 315 LBS | BODY MASS INDEX: 50.62 KG/M2 | SYSTOLIC BLOOD PRESSURE: 127 MMHG | HEART RATE: 90 BPM | HEIGHT: 66 IN | DIASTOLIC BLOOD PRESSURE: 87 MMHG

## 2018-10-25 DIAGNOSIS — F31.60 BIPOLAR AFFECTIVE DISORDER, CURRENT EPISODE MIXED, CURRENT EPISODE SEVERITY UNSPECIFIED (HCC): ICD-10-CM

## 2018-10-25 DIAGNOSIS — F41.0 PANIC ATTACKS: ICD-10-CM

## 2018-10-25 DIAGNOSIS — G47.00 INSOMNIA, UNSPECIFIED TYPE: ICD-10-CM

## 2018-10-25 DIAGNOSIS — F31.62 BIPOLAR DISORDER, CURRENT EPISODE MIXED, MODERATE (HCC): Primary | ICD-10-CM

## 2018-10-25 DIAGNOSIS — G47.9 SLEEP DISTURBANCE: ICD-10-CM

## 2018-10-25 DIAGNOSIS — F41.1 GENERALIZED ANXIETY DISORDER: ICD-10-CM

## 2018-10-25 DIAGNOSIS — F41.1 ANXIETY STATE: ICD-10-CM

## 2018-10-25 PROCEDURE — 99214 OFFICE O/P EST MOD 30 MIN: CPT | Performed by: PHYSICIAN ASSISTANT

## 2018-10-25 RX ORDER — TRAZODONE HYDROCHLORIDE 50 MG/1
50 TABLET ORAL
Qty: 90 TABLET | Refills: 0 | Status: SHIPPED | OUTPATIENT
Start: 2018-10-25 | End: 2018-12-11 | Stop reason: SDUPTHER

## 2018-10-25 RX ORDER — CLONAZEPAM 1 MG/1
1 TABLET ORAL DAILY PRN
Qty: 30 TABLET | Refills: 1 | Status: SHIPPED | OUTPATIENT
Start: 2018-10-25 | End: 2018-12-11 | Stop reason: SDUPTHER

## 2018-10-25 RX ORDER — BUSPIRONE HYDROCHLORIDE 10 MG/1
10 TABLET ORAL 2 TIMES DAILY
Qty: 180 TABLET | Refills: 0 | Status: SHIPPED | OUTPATIENT
Start: 2018-10-25 | End: 2018-12-11 | Stop reason: SDUPTHER

## 2018-10-25 NOTE — PSYCH
MEDICATION MANAGEMENT NOTE        [facility]      Name and Date of Birth:  Lida Alberts 47 y o  1965    Date of Visit: October 25, 2018    HPI:    Adriana Gleason is here for medication review with primary "So-so, my father had 2 strokes "   Since he heard the news of his father 6 days ago, he has been depressed with sadness, worry, self-isolating, current girlfriend is supportive  Over the past 2 weeks he has had racing thoughts and has been irritable for 5-10 minute spells--gets agitated by people at times, and having urges to spend money--repetitive thoughts to "Buy a new car "  He presently denies aggressive outbursts, SI, HI, panic attacks, or psychotic Sxs  He denies any ETOH or illicit drug use  He continues psychotherapy with Fernando mendosa 10/8/2018 note I reviewed  Pt reports compliance to his psychiatric medications without SE  He feels his mood medicines were working before his father's stroke and that the [place] controlled his depressive and manic type Sxs  He does not always take the Trazodone but when he does, he sleeps well on it  He will be traveling to [place] to visit his father this week and is unsure how long he will stay there  Appetite Changes and Sleep: fluctuating sleep pattern, fluctuating appetite, low energy    Review Of Systems:      Constitutional feeling tired   ENT negative   Cardiovascular negative   Respiratory negative   Gastrointestinal negative   Genitourinary negative   Musculoskeletal negative   Integumentary negative   Neurological negative   Endocrine negative   Other Symptoms none       Past Psychiatric History:   As copied from my 9/20/2018 note with updates as needed:  " [ Pt grew up with biological parents, 2 older sisters and 1 younger sister  He describes his upbringing as "We had a very loving family  "      He first experienced Sxs of a psychiatric nature in 2010 triggered by being layed off from his job and lost his house and truck  He was already  from his wife at that time and that was not contributing to his mood  (He had a steady girlfriend Marie at that time and it was a yoana relationship) His Sxs were many months of daily sadness, SI (no attempts or plan at that time), worry, hopelessness, worthlessness, lack of energy and motivation, (in later years also insomnia), and anxiety  He rarely had anxiety without simultaneous, depressive Sxs but always felt edgy  His girlfriend got him to go to the gym to work out  He also reports Sxs of anger and sometimes irritability, some irresponsible spending (it gave him pleasure to eat out just about every night of the week--to be around people or buying clothes and had put himself in debt at times), racing thoughts at night (moreso due to anxiety) He would spontaneously go away for the day (though someone always knew where he was going)       He is unsure of when panic attacks started but they occurred 1-2x per week for a period of about 2-3 months then then they reduced in frequency to approx once per month or less  Sxs were severe anxiety, SOB, chest tightness, tachycardia, feeling of fear, and body shaking  He would run outside to get air      He first saw a psychotherapist in approx 2014---Radha at "[facility]" who actually was his girlfriend Marie's therapist  José Manuel was also depressed at that time  He was given his own therapist there (but cannot recall the name)  He saw that therapist (who was female) for 1 year before she left the practice)  He was formally diagnosed with depression  He was then assigned a new therapist Alexia Hirsch) at the same facility and f/u with her for 6 months       He first developed developed psychotic Sxs in 2015 (would think he saw saw people out of the corner of his eye)   He denies any h/o auditory or tactile hallucinations       Pt was first seen by a psychiatrist during his one and only psychiatric hospitalization in approx 2014 --for depression with SI with plan (but no attempt) to drive his truck into a brick wall, as well as visual hallucination (described above) and HI toward a father in law and brother in law  He was started on Lithium       The Lithium dose was too high per Pt and when he f/u with psychiatrist Dr Daniel Treviño in the outpatient setting, she stopped the Lithium and gave Cymbalta and Clonazepam, and also another medicine (the name he cannot recall)  Dr Daniel Treviño formally diagnosed bipolar disorder Per Pt--based on the mood Sxs Hx he described above      He followed Dr Daniel Treviño for approx 1 year until insurance changed, then was without medicines or any psychiatric f/u for about 1 year  He was then referred to Shanelle Rudd by a  who was helping him get financial assistance for DM medications/care  Pt admitted to the  that his mood and anxiety Sxs were worsening       Arrested once at approx 19y/o for possession of stolen property   He was in snf for 45 days      Pt denies any h/o self harm behaviors, violent behaviors toward others, ECT, or  Hx     Pt tried: Cymbalta, Lithium (SE), Clonazepam          Traumatic History:      Abuse: none  Other Traumatic Events: none                                                          ] "        Past Medical History:    Past Medical History:   Diagnosis Date    ADHD, adult residual type     Cataract     Left eye    Depression     Diabetes mellitus ([address] )     blood sugar 167 on admission    Equinus deformity of foot     GERD (gastroesophageal reflux disease)     Homicidal ideations     Hyperlipidemia     Hypertension     Microalbuminuria     Morbid obesity ([address] )     Neuropathy     Sleep apnea     CPAP at bedtime    Suicidal intent        Substance Abuse History:    History   Alcohol Use    Yes     Comment: rarely     History   Drug Use No     Comment: quit 20 years ago       Social History:    Social History     Social History    Marital status: /Civil Union     Spouse name: N/A    Number of children: 1    Years of education: N/A     Occupational History    On disability      Social History Main Topics    Smoking status: Former Smoker     Types: Cigarettes    Smokeless tobacco: Current User     Types: Chew      Comment: pt "Quit after approx over 25 years ago"    Alcohol use Yes      Comment: rarely    Drug use: No      Comment: quit 20 years ago    Sexual activity: Yes     Partners: Female     Birth control/ protection: None      Comment: steady girlfriend     Other Topics Concern    Not on file     Social History Narrative     from spouse, technically still  but living with other partner  Children: 1 stepdaughter         Education:    Pt denies any hx of learning disabilities and reached childhood milestones on time as far as he knows  He wore braces for equinovarus but states he did not suffer delay in walking    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 because "I was just lazy, didn't do the work "    No college    Took some core classes in [employer] and worked as a volunteer  from approx [id number]             Substance Abuse History:     Pt drinks ETOH approx q 3-4 months but denies any h/o ETOH abuse  Pt tried smoking Crack once in the past and has been smoking THC once q 2-3 months since 14y/o  He denies other drug use, IVDA, addictions or drug abuse  Family Psychiatric History:     Family History   Problem Relation Age of Onset    Diabetes Mother     Hypertension Mother     Alcohol abuse Father     Diabetes Father     Hypertension Father     Alcohol abuse Sister     Depression Sister     Alcohol abuse Family        History Review:  The following portions of the patient's history were reviewed and updated as appropriate: allergies, current medications, past family history, past medical history, past social history, past surgical history and problem list          OBJECTIVE:     Mental Status Evaluation:    Appearance Casually dresssed, adequate hygiene, partial eye contact    Behavior Calm, cooperative, pleasant, shaking his legs in his seat   Speech Clear, normal rate and volume, not very spontaneous but willing to engage in interview   Mood Depressed, anxious   Affect Mildly constricted   Thought Processes Organized, goal directed   Associations intact associations   Thought Content No delusions   Perceptual Disturbances: Pt denies any form of hallucinations and does not appear to be responding to internal stimuli   Abnormal Thoughts  Risk Potential Suicidal ideation - None  Homicidal ideation - None  Potential for aggression - No   Orientation oriented to person, place, time/date, situation, day of week, month of year and year   Memory short term memory grossly intact   Cosciousness alert and awake   Attention Span attention span and concentration are age appropriate   Intellect not formally assessed   Insight good   Judgement good   Muscle Strength and  Gait normal gait , normal balance   Language no difficulty naming common objects, no difficulty repeating a phrase, no difficulty writing a sentence   Fund of Knowledge adequate knowledge of current events  adequate fund of knowledge regarding past history  adequate fund of knowledge regarding vocabulary    Pain none   Pain Scale 0       Laboratory Results: I have personally reviewed all pertinent laboratory/tests results  Assessment/plan:       Diagnoses and all orders for this visit:    Bipolar disorder, current episode mixed, moderate (HCC)    Generalized anxiety disorder    Insomnia, unspecified type          PLAN:  Pt is having moderate mood and anxiety Sxs for which I will increase Latuda and Buspirone respectively  Pt accepts the plan     Increase:  Latuda to 60mg (2) tabs po qd with dinner # 90 each  Buspirone to 10mg (1) tab po bid # 180  Continue:  Clonazepam 1mg (1) tab po qd prn anxiety or panic attacks # 30 R1  Trazodone 50mg (1) tab po qhs prn insomnia # 90  Pt has f/u appt with PMD 12/4/2018 for routine care  Return 8 weeks     Risks/Benefits      Risks, Benefits And Possible Side Effects Of Medications:    Risks, benefits, and possible side effects of medications explained to Yasmeen Mancini and he verbalizes understanding and agreement for treatment  Controlled Medication Discussion:     Yasmeen Mancini has been filling controlled prescriptions on time as prescribed according to [facility]    Discussed with Yasmeen Mancini the risks of sedation, respiratory depression, impairment of ability to drive and potential for abuse and addiction related to treatment with benzodiazepine medications  He understands risk of treatment with benzodiazepine medications, agrees to not drive if feels impaired and agrees to take medications as prescribed

## 2018-10-29 ENCOUNTER — TELEPHONE (OUTPATIENT)
Dept: PSYCHIATRY | Facility: CLINIC | Age: 53
End: 2018-10-29

## 2018-10-29 DIAGNOSIS — F31.62 BIPOLAR DISORDER, CURRENT EPISODE MIXED, MODERATE (HCC): Primary | ICD-10-CM

## 2018-10-29 NOTE — TELEPHONE ENCOUNTER
[facility] pharmacy called to inform [facility] insurance will only cover 120mg  Tablet of latuda  Not the 60mg  Tablets

## 2018-10-29 NOTE — TELEPHONE ENCOUNTER
I contacted Connor Alcala on his cell/home phone # of record to inform him that his insurance will not cover the 60mg tab of Latuda but will cover the 120mg tab so I am changing the dose tab  I explained that he will be taking ONE tab of the new dose each day and he verbalized understanding    I e-scribed the following to his [facility] pharmacy:  Latuda 120mg (1) tab po qd with dinner # 90

## 2018-11-04 DIAGNOSIS — I10 BENIGN ESSENTIAL HYPERTENSION: ICD-10-CM

## 2018-11-05 ENCOUNTER — OFFICE VISIT (OUTPATIENT)
Dept: BEHAVIORAL/MENTAL HEALTH CLINIC | Facility: CLINIC | Age: 53
End: 2018-11-05
Payer: MEDICARE

## 2018-11-05 DIAGNOSIS — G47.00 INSOMNIA, UNSPECIFIED TYPE: ICD-10-CM

## 2018-11-05 DIAGNOSIS — F41.1 GENERALIZED ANXIETY DISORDER: ICD-10-CM

## 2018-11-05 DIAGNOSIS — F31.62 BIPOLAR DISORDER, CURRENT EPISODE MIXED, MODERATE (HCC): Primary | ICD-10-CM

## 2018-11-05 PROCEDURE — 90834 PSYTX W PT 45 MINUTES: CPT | Performed by: SOCIAL WORKER

## 2018-11-05 RX ORDER — LISINOPRIL AND HYDROCHLOROTHIAZIDE 25; 20 MG/1; MG/1
1 TABLET ORAL DAILY
Qty: 90 TABLET | Refills: 1 | Status: SHIPPED | OUTPATIENT
Start: 2018-11-05 | End: 2019-04-27 | Stop reason: SDUPTHER

## 2018-11-05 RX ORDER — AMLODIPINE BESYLATE 5 MG/1
5 TABLET ORAL DAILY
Qty: 90 TABLET | Refills: 1 | Status: SHIPPED | OUTPATIENT
Start: 2018-11-05 | End: 2019-04-27 | Stop reason: SDUPTHER

## 2018-11-05 NOTE — PSYCH
Psychotherapy Provided: Individual Psychotherapy 45 minutes     Length of time in session: 45 minutes, follow up in 2 week    Goals addressed in session: Goal 1, Goal 2 and Goal 3      Pain:      moderate to severe    0    Current suicide risk : Low     Data: Bernie Chávez discussed how his father had a recent stroke and how some of the family wants him to be on all kinds of life support but the patient his father wants no extreme measures  His wife realizes the kids will get everything and she wants this dragged out as much as long as possible  He is fufilling his goals of exercising and trying to eat healthy  He goes to the gym 3 times a week  Regarding goal 2 he is avoiding difficult people  Assessment: The client was advised if these were his dad's wishes they need to go to a  since his dad is alert and he is not incompetent  He is upset his 48year old girl friend is not taking care of herself  But it has motivated him to take care of himself now  He is less depressed and is happy about the progress he has made  Behavioral Health Treatment Plan [facility]: Diagnosis and Treatment Plan explained to Maira Wright relates understanding diagnosis and is agreeable to Treatment Plan   Yes

## 2018-11-08 DIAGNOSIS — E11.65 TYPE 2 DIABETES MELLITUS WITH HYPERGLYCEMIA, UNSPECIFIED WHETHER LONG TERM INSULIN USE (HCC): ICD-10-CM

## 2018-11-08 RX ORDER — INSULIN LISPRO 100 [IU]/ML
INJECTION, SOLUTION INTRAVENOUS; SUBCUTANEOUS
Qty: 15 ML | Refills: 0 | Status: SHIPPED | OUTPATIENT
Start: 2018-11-08 | End: 2018-11-24 | Stop reason: SDUPTHER

## 2018-11-19 DIAGNOSIS — E11.42 DIABETIC POLYNEUROPATHY ASSOCIATED WITH TYPE 2 DIABETES MELLITUS (HCC): ICD-10-CM

## 2018-11-19 RX ORDER — GABAPENTIN 300 MG/1
CAPSULE ORAL
Qty: 180 CAPSULE | Refills: 0 | Status: SHIPPED | OUTPATIENT
Start: 2018-11-19 | End: 2019-02-11 | Stop reason: SDUPTHER

## 2018-11-24 ENCOUNTER — APPOINTMENT (OUTPATIENT)
Dept: LAB | Facility: CLINIC | Age: 53
End: 2018-11-24
Payer: MEDICARE

## 2018-11-24 DIAGNOSIS — I10 BENIGN ESSENTIAL HYPERTENSION: ICD-10-CM

## 2018-11-24 DIAGNOSIS — E11.65 TYPE 2 DIABETES MELLITUS WITH HYPERGLYCEMIA, WITH LONG-TERM CURRENT USE OF INSULIN (HCC): ICD-10-CM

## 2018-11-24 DIAGNOSIS — E11.65 TYPE 2 DIABETES MELLITUS WITH HYPERGLYCEMIA, UNSPECIFIED WHETHER LONG TERM INSULIN USE (HCC): ICD-10-CM

## 2018-11-24 DIAGNOSIS — Z79.4 TYPE 2 DIABETES MELLITUS WITH HYPERGLYCEMIA, WITH LONG-TERM CURRENT USE OF INSULIN (HCC): ICD-10-CM

## 2018-11-24 LAB
ALBUMIN SERPL BCP-MCNC: 2.8 G/DL (ref 3.5–5)
ALP SERPL-CCNC: 96 U/L (ref 46–116)
ALT SERPL W P-5'-P-CCNC: 19 U/L (ref 12–78)
ANION GAP SERPL CALCULATED.3IONS-SCNC: 3 MMOL/L (ref 4–13)
AST SERPL W P-5'-P-CCNC: 12 U/L (ref 5–45)
BILIRUB SERPL-MCNC: 0.3 MG/DL (ref 0.2–1)
BUN SERPL-MCNC: 10 MG/DL (ref 5–25)
CALCIUM SERPL-MCNC: 8.9 MG/DL (ref 8.3–10.1)
CHLORIDE SERPL-SCNC: 105 MMOL/L (ref 100–108)
CO2 SERPL-SCNC: 30 MMOL/L (ref 21–32)
CREAT SERPL-MCNC: 1.42 MG/DL (ref 0.6–1.3)
CREAT UR-MCNC: 176 MG/DL
EST. AVERAGE GLUCOSE BLD GHB EST-MCNC: 226 MG/DL
GFR SERPL CREATININE-BSD FRML MDRD: 65 ML/MIN/1.73SQ M
GLUCOSE P FAST SERPL-MCNC: 88 MG/DL (ref 65–99)
HBA1C MFR BLD: 9.5 % (ref 4.2–6.3)
MICROALBUMIN UR-MCNC: 2000 MG/L (ref 0–20)
MICROALBUMIN/CREAT 24H UR: 1136 MG/G CREATININE (ref 0–30)
POTASSIUM SERPL-SCNC: 4 MMOL/L (ref 3.5–5.3)
PROT SERPL-MCNC: 7.2 G/DL (ref 6.4–8.2)
SODIUM SERPL-SCNC: 138 MMOL/L (ref 136–145)

## 2018-11-24 PROCEDURE — 80053 COMPREHEN METABOLIC PANEL: CPT

## 2018-11-24 PROCEDURE — 83036 HEMOGLOBIN GLYCOSYLATED A1C: CPT

## 2018-11-24 PROCEDURE — 82570 ASSAY OF URINE CREATININE: CPT

## 2018-11-24 PROCEDURE — 82043 UR ALBUMIN QUANTITATIVE: CPT

## 2018-11-24 PROCEDURE — 36415 COLL VENOUS BLD VENIPUNCTURE: CPT

## 2018-11-25 RX ORDER — INSULIN LISPRO 100 [IU]/ML
INJECTION, SOLUTION INTRAVENOUS; SUBCUTANEOUS
Qty: 15 ML | Refills: 0 | Status: SHIPPED | OUTPATIENT
Start: 2018-11-25 | End: 2018-12-10 | Stop reason: SDUPTHER

## 2018-11-28 DIAGNOSIS — IMO0002 UNCONTROLLED TYPE 2 DIABETES MELLITUS WITH DIABETIC POLYNEUROPATHY, WITH LONG-TERM CURRENT USE OF INSULIN: ICD-10-CM

## 2018-11-28 RX ORDER — LANCETS 28 GAUGE
EACH MISCELLANEOUS
Qty: 300 EACH | Refills: 0 | Status: SHIPPED | OUTPATIENT
Start: 2018-11-28 | End: 2019-11-21 | Stop reason: SDUPTHER

## 2018-12-04 ENCOUNTER — OFFICE VISIT (OUTPATIENT)
Dept: INTERNAL MEDICINE CLINIC | Facility: CLINIC | Age: 53
End: 2018-12-04
Payer: MEDICARE

## 2018-12-04 VITALS
DIASTOLIC BLOOD PRESSURE: 84 MMHG | BODY MASS INDEX: 49.44 KG/M2 | TEMPERATURE: 98.6 F | WEIGHT: 315 LBS | HEART RATE: 100 BPM | HEIGHT: 67 IN | SYSTOLIC BLOOD PRESSURE: 130 MMHG

## 2018-12-04 DIAGNOSIS — Z23 NEED FOR PNEUMOCOCCAL VACCINATION: ICD-10-CM

## 2018-12-04 DIAGNOSIS — E11.65 TYPE 2 DIABETES MELLITUS WITH HYPERGLYCEMIA, WITH LONG-TERM CURRENT USE OF INSULIN (HCC): Primary | ICD-10-CM

## 2018-12-04 DIAGNOSIS — Z79.4 TYPE 2 DIABETES MELLITUS WITH HYPERGLYCEMIA, WITH LONG-TERM CURRENT USE OF INSULIN (HCC): Primary | ICD-10-CM

## 2018-12-04 DIAGNOSIS — Z23 NEED FOR INFLUENZA VACCINATION: ICD-10-CM

## 2018-12-04 DIAGNOSIS — I10 BENIGN ESSENTIAL HYPERTENSION: ICD-10-CM

## 2018-12-04 DIAGNOSIS — E08.42 DIABETIC POLYNEUROPATHY ASSOCIATED WITH DIABETES MELLITUS DUE TO UNDERLYING CONDITION (HCC): ICD-10-CM

## 2018-12-04 DIAGNOSIS — E66.01 MORBID OBESITY WITH BMI OF 50.0-59.9, ADULT (HCC): ICD-10-CM

## 2018-12-04 DIAGNOSIS — F31.60 BIPOLAR AFFECTIVE DISORDER, CURRENT EPISODE MIXED, CURRENT EPISODE SEVERITY UNSPECIFIED (HCC): ICD-10-CM

## 2018-12-04 PROBLEM — M25.50 JOINT PAIN: Status: RESOLVED | Noted: 2018-09-20 | Resolved: 2018-12-04

## 2018-12-04 PROBLEM — K20.90 ESOPHAGITIS: Status: RESOLVED | Noted: 2018-06-29 | Resolved: 2018-12-04

## 2018-12-04 PROBLEM — G47.00 INSOMNIA: Status: RESOLVED | Noted: 2018-05-14 | Resolved: 2018-12-04

## 2018-12-04 PROBLEM — Z12.11 SCREEN FOR COLON CANCER: Status: RESOLVED | Noted: 2018-06-29 | Resolved: 2018-12-04

## 2018-12-04 PROCEDURE — G0009 ADMIN PNEUMOCOCCAL VACCINE: HCPCS | Performed by: PHYSICIAN ASSISTANT

## 2018-12-04 PROCEDURE — 90682 RIV4 VACC RECOMBINANT DNA IM: CPT | Performed by: PHYSICIAN ASSISTANT

## 2018-12-04 PROCEDURE — 99213 OFFICE O/P EST LOW 20 MIN: CPT | Performed by: PHYSICIAN ASSISTANT

## 2018-12-04 PROCEDURE — 90732 PPSV23 VACC 2 YRS+ SUBQ/IM: CPT | Performed by: PHYSICIAN ASSISTANT

## 2018-12-04 PROCEDURE — G0008 ADMIN INFLUENZA VIRUS VAC: HCPCS | Performed by: PHYSICIAN ASSISTANT

## 2018-12-04 NOTE — PROGRESS NOTES
Assessment/Plan:    Type 2 diabetes mellitus with hyperglycemia (HCC)  Lab Results   Component Value Date    HGBA1C 9 5 (H) 11/24/2018     As we discussed today please continue your Lantus 42 units daily  Please resume your Humalog and be sure to take 35 units twice a day every day with meals  Also as discussed during the holidays for a larger meal you may increase your Humalog to 36-38 units to help cover for this larger meal   You are also aware of the importance of getting her sugars back under better control to help reduce the risk of furthering kidney damage leading to dialysis  Diagnoses and all orders for this visit:    Type 2 diabetes mellitus with hyperglycemia, with long-term current use of insulin ([facility] )  -     Comprehensive metabolic panel; Future  -     Hemoglobin A1C; Future  -     Lipid Panel with Direct LDL reflex; Future  -     Microalbumin / creatinine urine ratio; Future    Diabetic polyneuropathy associated with diabetes mellitus due to underlying condition ([facility] )    Benign essential hypertension    Bipolar affective disorder, current episode mixed, current episode severity unspecified ([facility] )    Need for pneumococcal vaccination  -     PNEUMOCOCCAL POLYSACCHARIDE VACCINE 23-VALENT =>3YO SQ IM    Need for influenza vaccination  -     influenza vaccine, [id number], quadrivalent, recombinant, PF, 0 5 mL, for patients 18 yr+ ([facility])    Morbid obesity with BMI of 50 0-59 9, adult ([facility])          Subjective:      Patient ID: Tan Jones is a 47 y o  male  Pt here for lab review   A1C inc to 9 5% from 7 5% in August  3 month follow up  Confirms Lantus 42 units daily  Taking 35 units humalog sometimes twice once or not at all depending on what he eats or not if skips meals   States will skip meal if just does not feel like eating  Patient reports most of the time at most he might take the Humalog once a day  With discussion patient does admit he feels like his sugars were higher  He does not check them on a regular basis and just states he was not feeling as well  As with all previous visits we had a long discussion again about the importance of getting better control of his diabetes  At last visit in August he was actually at a 7 5% and that was the 1st time he has been below 8  Patient has remained resistant to adjusting his medication and will frequently adjust his own insulin doses on his own without advising our office  Patient has also made it very clear on all previous evaluations that he does not plan to make any dietary changes  We discussed again the importance of better control and that if he chooses to continue poor diet than that just means we have to continuously increase his insulin to compensate for this  This time however I also reiterated that his microalbumin level is extremely elevated and while his EGFR is at 65% this does not mean that his kidneys are not in any danger of permanent irreversible damage  We discussed that the course if no intervention is made is progression to dialysis as well as all the other complications including decreased, loss of vision and infections and possible amputations secondary to these infections if these are not improved  Patient continues to follow with his mental health providers  Patient denies any recent medication change and feels that his symptoms are well controlled  Patient is also morbidly obese and has declined nutrition classes as well as referral for weight management  At previous visit when patient's A1c was 7 5 he was to remain on the Humalog 35 units twice daily however his Lantus was increased to 42 units daily  I did not change these doses today as by patient's own admission he is frequently not even taking the Humalog and sometimes only once daily                The following portions of the patient's history were reviewed and updated as appropriate: allergies, current medications, past family history, past medical history, past social history, past surgical history and problem list     Review of Systems   Constitutional: Negative for chills, fatigue, fever and unexpected weight change  Respiratory: Negative  Negative for cough and shortness of breath  Cardiovascular: Positive for leg swelling  Negative for chest pain and palpitations  Gastrointestinal: Negative  Negative for abdominal pain, constipation and diarrhea  Endocrine: Positive for polydipsia and polyuria  Negative for cold intolerance, heat intolerance and polyphagia  Genitourinary: Positive for frequency  Skin: Negative  Negative for rash  Neurological: Positive for headaches  Negative for dizziness and light-headedness  Psychiatric/Behavioral: Negative  Objective:      /84 (BP Location: Right arm, Patient Position: Sitting, Cuff Size: Large)   Pulse 100   Temp 98 6 °F (37 °C) (Oral)   Ht 5' 6 5" (1 689 m)   Wt (!) 153 kg (336 lb 13 8 oz)   BMI 53 56 kg/m²          Physical Exam   Constitutional: He is oriented to person, place, and time  He appears well-developed and well-nourished  HENT:   Head: Normocephalic  Neck: No thyromegaly present  Cardiovascular: Normal rate, regular rhythm and normal heart sounds  No murmur heard  Pulmonary/Chest: Effort normal and breath sounds normal  No respiratory distress  He has no wheezes  Abdominal: Soft  Bowel sounds are normal  There is no tenderness  Exam limited by body habitus   Musculoskeletal: He exhibits edema (minimal 1+ non pitting oedema)  Neurological: He is alert and oriented to person, place, and time  Skin: Skin is warm and dry  No rash noted  Psychiatric: He has a normal mood and affect  His behavior is normal    After fact is slightly flat however appropriate  Patient was able to verbalize information and instructions

## 2018-12-04 NOTE — PATIENT INSTRUCTIONS
As discussed please resume you full insulin doses  Please take Lantus 42 units daily  Humalog 35 units twice a day every day  No skipping meals  Get labs as dated in 3 months  Appt here after to review    Vaccines updated today  As we discussed today if your sugars do not improve you are on a direct pathway for worsening of kidney function which can ultimately lead to dialysis in addition to loss of vision as well as possible infections or amputations of extremities  The your able to verbalize the importance and the goal that you wish to avoid any of these outcomes  We also discussed that through the holiday season and eating heavier meals and different foods that you may certainly increase the insulin Humalog to 36 or 38 units to help cover for the larger meals during the holidays

## 2018-12-04 NOTE — ASSESSMENT & PLAN NOTE
Lab Results   Component Value Date    HGBA1C 9 5 (H) 11/24/2018     As we discussed today please continue your Lantus 42 units daily  Please resume your Humalog and be sure to take 35 units twice a day every day with meals  Also as discussed during the holidays for a larger meal you may increase your Humalog to 36-38 units to help cover for this larger meal   You are also aware of the importance of getting her sugars back under better control to help reduce the risk of furthering kidney damage leading to dialysis

## 2018-12-10 DIAGNOSIS — E11.65 TYPE 2 DIABETES MELLITUS WITH HYPERGLYCEMIA, UNSPECIFIED WHETHER LONG TERM INSULIN USE (HCC): ICD-10-CM

## 2018-12-10 RX ORDER — INSULIN LISPRO 100 [IU]/ML
INJECTION, SOLUTION INTRAVENOUS; SUBCUTANEOUS
Qty: 15 ML | Refills: 2 | Status: SHIPPED | OUTPATIENT
Start: 2018-12-10 | End: 2019-02-05 | Stop reason: SDUPTHER

## 2018-12-10 NOTE — TELEPHONE ENCOUNTER
I called the pharmacy and was informed that it was filled for a 21 day supply and with the holidays coming they want to be a head of the game  He should need it in like 15 days

## 2018-12-11 ENCOUNTER — OFFICE VISIT (OUTPATIENT)
Dept: PSYCHIATRY | Facility: CLINIC | Age: 53
End: 2018-12-11
Payer: MEDICARE

## 2018-12-11 VITALS — BODY MASS INDEX: 50.62 KG/M2 | WEIGHT: 315 LBS | HEIGHT: 66 IN

## 2018-12-11 DIAGNOSIS — G47.00 INSOMNIA, UNSPECIFIED TYPE: ICD-10-CM

## 2018-12-11 DIAGNOSIS — F41.0 PANIC ATTACKS: ICD-10-CM

## 2018-12-11 DIAGNOSIS — F41.1 GENERALIZED ANXIETY DISORDER: ICD-10-CM

## 2018-12-11 DIAGNOSIS — F31.62 BIPOLAR DISORDER, CURRENT EPISODE MIXED, MODERATE (HCC): Primary | ICD-10-CM

## 2018-12-11 PROCEDURE — 99214 OFFICE O/P EST MOD 30 MIN: CPT | Performed by: PHYSICIAN ASSISTANT

## 2018-12-11 RX ORDER — CLONAZEPAM 1 MG/1
1 TABLET ORAL DAILY PRN
Qty: 30 TABLET | Refills: 1 | Status: SHIPPED | OUTPATIENT
Start: 2018-12-11 | End: 2019-05-10 | Stop reason: SDUPTHER

## 2018-12-11 RX ORDER — BUSPIRONE HYDROCHLORIDE 10 MG/1
10 TABLET ORAL 2 TIMES DAILY
Qty: 180 TABLET | Refills: 0 | Status: SHIPPED | OUTPATIENT
Start: 2018-12-11 | End: 2019-04-12 | Stop reason: SDUPTHER

## 2018-12-11 RX ORDER — BUPROPION HYDROCHLORIDE 100 MG/1
100 TABLET ORAL DAILY
Qty: 90 TABLET | Refills: 0 | Status: SHIPPED | OUTPATIENT
Start: 2018-12-11 | End: 2019-04-12 | Stop reason: SDUPTHER

## 2018-12-11 RX ORDER — TRAZODONE HYDROCHLORIDE 50 MG/1
50 TABLET ORAL
Qty: 90 TABLET | Refills: 0 | Status: SHIPPED | OUTPATIENT
Start: 2018-12-11 | End: 2019-04-12 | Stop reason: SDUPTHER
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
## 2018-12-14 ENCOUNTER — OFFICE VISIT (OUTPATIENT)
Dept: BEHAVIORAL/MENTAL HEALTH CLINIC | Facility: CLINIC | Age: 53
End: 2018-12-14
Payer: MEDICARE

## 2018-12-14 DIAGNOSIS — F41.1 GENERALIZED ANXIETY DISORDER: ICD-10-CM

## 2018-12-14 DIAGNOSIS — G47.00 INSOMNIA, UNSPECIFIED TYPE: ICD-10-CM

## 2018-12-14 DIAGNOSIS — F31.60 BIPOLAR AFFECTIVE DISORDER, CURRENT EPISODE MIXED, CURRENT EPISODE SEVERITY UNSPECIFIED (HCC): Primary | ICD-10-CM

## 2018-12-14 PROCEDURE — 90834 PSYTX W PT 45 MINUTES: CPT | Performed by: SOCIAL WORKER

## 2018-12-14 NOTE — PSYCH
Psychotherapy Provided: Individual Psychotherapy 45 minutes     Length of time in session: 45 minutes, follow up in 2 week    Goals addressed in session: Goal 1 and Goal 2     Pain:      moderate to severe    0    Current suicide risk : Low     Data: Denisa Tolentino arrived for his session  He discussed how his dad summoned the family and his  to tell them his final wishes  However he left the hospital for home and he is doing ok  Regarding his goals he admits regarding his first goal he is not currently doing much to improve his overall health  However he feels he is making progress on dealing with difficult people  Assessment: He is more stress free and relaxed  We continue to work on mindfulness  Plan: Continue to skill build in distress tolerance  Behavioral Health Treatment Plan [facility]: Diagnosis and Treatment Plan explained to Sharee Hinds relates understanding diagnosis and is agreeable to Treatment Plan   Yes

## 2018-12-17 DIAGNOSIS — F31.60 BIPOLAR AFFECTIVE DISORDER, CURRENT EPISODE MIXED, CURRENT EPISODE SEVERITY UNSPECIFIED (HCC): ICD-10-CM

## 2018-12-17 RX ORDER — LURASIDONE HYDROCHLORIDE 80 MG/1
TABLET, FILM COATED ORAL
Qty: 90 TABLET | Refills: 0 | OUTPATIENT
Start: 2018-12-17

## 2019-01-04 ENCOUNTER — OFFICE VISIT (OUTPATIENT)
Dept: BEHAVIORAL/MENTAL HEALTH CLINIC | Facility: CLINIC | Age: 54
End: 2019-01-04
Payer: MEDICARE

## 2019-01-04 DIAGNOSIS — F41.1 GENERALIZED ANXIETY DISORDER: ICD-10-CM

## 2019-01-04 DIAGNOSIS — G47.00 INSOMNIA, UNSPECIFIED TYPE: ICD-10-CM

## 2019-01-04 DIAGNOSIS — F31.60 BIPOLAR AFFECTIVE DISORDER, CURRENT EPISODE MIXED, CURRENT EPISODE SEVERITY UNSPECIFIED (HCC): Primary | ICD-10-CM

## 2019-01-04 PROCEDURE — 90834 PSYTX W PT 45 MINUTES: CPT | Performed by: SOCIAL WORKER

## 2019-01-04 NOTE — PSYCH
Psychotherapy Provided: Individual Psychotherapy 45 minutes     Length of time in session: 45 minutes, follow up in 2 week    Goals addressed in session: Goal 1 and Goal 2     Pain:      moderate to severe    0    Current suicide risk : Low     Data: Csah Candelaria arrived for his session  He discussed that his holidays were quite nice and he discussed the household where he resides with his girlfriend, her dad and his niece are peaceful  His current stressor is the fact he and his girlfriend both need work done on their vehicles  Regarding his goals he admits he is not making headway on his first goal of exercising and eating better  We discussed strategies to make progress in this area  Regarding goal 2 he feels things are smoother, he is less depressed and he is not letting difficult people bother him  Assessment: We discussed mindfulness and meditation strategies  We also discussed exercise and healthy eating strategies  Client is looking forward to a trip back home to [place]  Plan: Continue to skill build in distress tolerance  Behavioral Health Treatment Plan [facility]: Diagnosis and Treatment Plan explained to Jeremy Rodriguez relates understanding diagnosis and is agreeable to Treatment Plan   Yes

## 2019-01-22 ENCOUNTER — OFFICE VISIT (OUTPATIENT)
Dept: MULTI SPECIALTY CLINIC | Facility: CLINIC | Age: 54
End: 2019-01-22

## 2019-01-22 VITALS
DIASTOLIC BLOOD PRESSURE: 88 MMHG | BODY MASS INDEX: 49.44 KG/M2 | TEMPERATURE: 97.9 F | HEART RATE: 88 BPM | WEIGHT: 315 LBS | HEIGHT: 67 IN | SYSTOLIC BLOOD PRESSURE: 148 MMHG

## 2019-01-22 DIAGNOSIS — B35.1 ONYCHOMYCOSIS: ICD-10-CM

## 2019-01-22 DIAGNOSIS — E08.42 DIABETIC POLYNEUROPATHY ASSOCIATED WITH DIABETES MELLITUS DUE TO UNDERLYING CONDITION (HCC): Primary | ICD-10-CM

## 2019-01-22 PROCEDURE — 99213 OFFICE O/P EST LOW 20 MIN: CPT | Performed by: PODIATRIST

## 2019-01-22 PROCEDURE — 11719 TRIM NAIL(S) ANY NUMBER: CPT | Performed by: PODIATRIST

## 2019-01-22 NOTE — PROGRESS NOTES
Routine Foot Care- Podiatry   Federica Marvin 47 y o  male MRN: [id number]  Encounter: [id number]    Assessment/Plan     Assessment:  1  Onychomycosis  2  DM    Plan:  - Patient was seen/examined  All questions and concerns addressed  - Nails x10 were sharply trimmed to normal length and thickness with a large nail nipper without incident   -Stressed the importance of glycemic control, shoe gear, and proper diabetic foot care  - RTC in 6 months      History of Present Illness     HPI:  Federica Marvin is a 47 y o  male who presents with elongated toenails  States that their nails are painful, elongated  They have difficulty applying their socks and shoes  The pressure within their shoe gear is painful and they have been unable to cut their nails adequately  Patient admits to numbness/tingling  They state their last blood glucose level was 150mg/dL but he does not check it everyday  The patient denies any nausea, vomiting, fever, chills, shortness of breath, or chest pains  Review of Systems   Constitutional: Negative  HENT: Negative  Eyes: Negative  Respiratory: Negative  Cardiovascular: Negative  Gastrointestinal: Negative  Musculoskeletal: Negative   Skin: elongated thickened toenails  Neurological: Negative          Historical Information   Past Medical History:   Diagnosis Date    ADHD, adult residual type     Cataract     Left eye    Depression     Diabetes mellitus (HCC)     blood sugar 167 on admission    Equinus deformity of foot     GERD (gastroesophageal reflux disease)     Homicidal ideations     Hyperlipidemia     Hypertension     Microalbuminuria     Morbid obesity (HCC)     Neuropathy     Sleep apnea     CPAP at bedtime    Suicidal intent      Past Surgical History:   Procedure Laterality Date    CATARACT EXTRACTION      HAND SURGERY Right     OTHER SURGICAL HISTORY      REPAIR OF SUPERFICIAL WOUND FACE    AR ESOPHAGOGASTRODUODENOSCOPY TRANSORAL DIAGNOSTIC N/A 6/14/2018    Procedure: ESOPHAGOGASTRODUODENOSCOPY (EGD); Surgeon: Rosalie Orona MD;  Location: [facility]; Service: Gastroenterology    OR ESOPHAGOGASTRODUODENOSCOPY TRANSORAL DIAGNOSTIC N/A 9/12/2018    Procedure: EGD AND COLONOSCOPY;  Surgeon: Rosalie Orona MD;  Location:  GI LAB; Service: Gastroenterology     Social History   History   Alcohol Use    Yes     Comment: rarely     History   Drug Use No     Comment: quit 20 years ago     History   Smoking Status    Former Smoker    Types: Cigarettes   Smokeless Tobacco    Current User    Types: Chew     Comment: pt "Quit after approx over 25 years ago"     Family History:   Family History   Problem Relation Age of Onset    Diabetes Mother     Hypertension Mother     Alcohol abuse Father     Diabetes Father     Hypertension Father     Alcohol abuse Sister     Depression Sister     Alcohol abuse Family        Meds/Allergies     (Not in a hospital admission)  Allergies   Allergen Reactions    Pollen Extract Nasal Congestion    Tetanus Toxoid Swelling       Objective     Current Vitals:   Blood Pressure: 148/88 (01/22/19 0951)  Pulse: 88 (01/22/19 0951)  Temperature: 97 9 °F (36 6 °C) (01/22/19 0951)  Temp Source: Oral (01/22/19 0951)  Height: 5' 7" (170 2 cm) (01/22/19 0951)  Weight - Scale: (!) 155 kg (341 lb 11 4 oz) (01/22/19 0951)        /88 (BP Location: Right arm, Patient Position: Sitting, Cuff Size: Large)   Pulse 88   Temp 97 9 °F (36 6 °C) (Oral)   Ht 5' 7" (1 702 m)   Wt (!) 155 kg (341 lb 11 4 oz)   BMI 53 52 kg/m²       Lower Extremity Exam:    Musculoskeletal:  MMT is 5/5 to all compartments of the LE, +1/4 edema B/L, Digital ROM is intact,      Pulses:   R DP is +2/4, R PT is +1/4, L DP is +2/4, L PT is +1/4, CFT< 3sec to all digits  Pedal hair is Absent     Skin:   Nails are thickened, elongated, TTP with notable subungual debris  No open Lesions  Skin of the LE is normal texture, turgor          Neurologic:  Gross sensation is intact   Protective sensation is Diminished

## 2019-01-25 ENCOUNTER — OFFICE VISIT (OUTPATIENT)
Dept: BEHAVIORAL/MENTAL HEALTH CLINIC | Facility: CLINIC | Age: 54
End: 2019-01-25
Payer: MEDICARE

## 2019-01-25 DIAGNOSIS — F41.1 GENERALIZED ANXIETY DISORDER: ICD-10-CM

## 2019-01-25 DIAGNOSIS — G47.00 INSOMNIA, UNSPECIFIED TYPE: ICD-10-CM

## 2019-01-25 DIAGNOSIS — F31.60 BIPOLAR AFFECTIVE DISORDER, CURRENT EPISODE MIXED, CURRENT EPISODE SEVERITY UNSPECIFIED (HCC): Primary | ICD-10-CM

## 2019-01-25 PROCEDURE — 90834 PSYTX W PT 45 MINUTES: CPT | Performed by: SOCIAL WORKER

## 2019-01-25 NOTE — PSYCH
Psychotherapy Provided: Individual Psychotherapy 45 minutes     Length of time in session: 45 minutes, follow up in 2 week    Goals addressed in session: Goal 1, Goal 2 and Goal 3      Pain:      moderate to severe    0    Current suicide risk : Low     Data: Nikhil Velasco arrived for his session  He discussed his girlfriends 50th birthday party  His mood is very good he reports  However his first goal is to lose weight and to eat more nutritionally  He admits he is eating healthy  But admits he has not yet gone back to the gym  Regarding goal 2 he is dealing and he is coping with difficult people better  Assessment: We discussed mindfulness skills , meditation  Healthy nutrition and exercise to help decrease symptoms of depression and anxiety  Plan: Continue to help him progress on his goals  Behavioral Health Treatment Plan [facility]: Diagnosis and Treatment Plan explained to Malinda Byrne relates understanding diagnosis and is agreeable to Treatment Plan   Yes

## 2019-02-03 DIAGNOSIS — E11.65 TYPE 2 DIABETES MELLITUS WITH HYPERGLYCEMIA, UNSPECIFIED WHETHER LONG TERM INSULIN USE (HCC): ICD-10-CM

## 2019-02-05 DIAGNOSIS — E11.65 TYPE 2 DIABETES MELLITUS WITH HYPERGLYCEMIA, UNSPECIFIED WHETHER LONG TERM INSULIN USE (HCC): ICD-10-CM

## 2019-02-05 RX ORDER — INSULIN LISPRO 100 [IU]/ML
INJECTION, SOLUTION INTRAVENOUS; SUBCUTANEOUS
Qty: 15 ML | Refills: 0 | OUTPATIENT
Start: 2019-02-05

## 2019-02-05 RX ORDER — INSULIN LISPRO 100 [IU]/ML
INJECTION, SOLUTION INTRAVENOUS; SUBCUTANEOUS
Qty: 63 ML | Refills: 1 | Status: SHIPPED | OUTPATIENT
Start: 2019-02-05 | End: 2019-06-25

## 2019-02-11 DIAGNOSIS — E11.42 DIABETIC POLYNEUROPATHY ASSOCIATED WITH TYPE 2 DIABETES MELLITUS (HCC): ICD-10-CM

## 2019-02-11 RX ORDER — GABAPENTIN 300 MG/1
CAPSULE ORAL
Qty: 180 CAPSULE | Refills: 0 | Status: SHIPPED | OUTPATIENT
Start: 2019-02-11 | End: 2019-05-10 | Stop reason: SDUPTHER
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
## 2019-02-14 ENCOUNTER — OFFICE VISIT (OUTPATIENT)
Dept: PSYCHIATRY | Facility: CLINIC | Age: 54
End: 2019-02-14
Payer: MEDICARE

## 2019-02-14 VITALS
HEART RATE: 90 BPM | SYSTOLIC BLOOD PRESSURE: 167 MMHG | BODY MASS INDEX: 50.62 KG/M2 | HEIGHT: 66 IN | WEIGHT: 315 LBS | DIASTOLIC BLOOD PRESSURE: 112 MMHG

## 2019-02-14 DIAGNOSIS — F31.60 BIPOLAR AFFECTIVE DISORDER, CURRENT EPISODE MIXED, CURRENT EPISODE SEVERITY UNSPECIFIED (HCC): Primary | ICD-10-CM

## 2019-02-14 DIAGNOSIS — F41.1 GENERALIZED ANXIETY DISORDER: ICD-10-CM

## 2019-02-14 DIAGNOSIS — G47.00 INSOMNIA, UNSPECIFIED TYPE: ICD-10-CM

## 2019-02-14 PROCEDURE — 99213 OFFICE O/P EST LOW 20 MIN: CPT | Performed by: PHYSICIAN ASSISTANT

## 2019-03-01 ENCOUNTER — TELEPHONE (OUTPATIENT)
Dept: INTERNAL MEDICINE CLINIC | Facility: CLINIC | Age: 54
End: 2019-03-01

## 2019-03-01 NOTE — TELEPHONE ENCOUNTER
CALLED PATIENT AND HAS BEEN REMINDED THAT HE HAVE AN APPT WITH [facility] AND THERE'S BLOOD WORK TO BE COMPLETED PRIOR HIS APPT  PATIENT SAID HE'S GOING TOMORROW MORNING TO HAVE IT COMPLETED

## 2019-03-04 ENCOUNTER — OFFICE VISIT (OUTPATIENT)
Dept: INTERNAL MEDICINE CLINIC | Facility: CLINIC | Age: 54
End: 2019-03-04

## 2019-03-04 ENCOUNTER — TRANSCRIBE ORDERS (OUTPATIENT)
Dept: LAB | Facility: CLINIC | Age: 54
End: 2019-03-04

## 2019-03-04 ENCOUNTER — APPOINTMENT (OUTPATIENT)
Dept: LAB | Facility: CLINIC | Age: 54
End: 2019-03-04
Payer: MEDICARE

## 2019-03-04 VITALS
DIASTOLIC BLOOD PRESSURE: 80 MMHG | HEIGHT: 67 IN | WEIGHT: 315 LBS | HEART RATE: 100 BPM | TEMPERATURE: 98 F | SYSTOLIC BLOOD PRESSURE: 134 MMHG | BODY MASS INDEX: 49.44 KG/M2

## 2019-03-04 DIAGNOSIS — E11.65 TYPE 2 DIABETES MELLITUS WITH HYPERGLYCEMIA, WITH LONG-TERM CURRENT USE OF INSULIN (HCC): ICD-10-CM

## 2019-03-04 DIAGNOSIS — N18.30 STAGE 3 CHRONIC KIDNEY DISEASE (HCC): ICD-10-CM

## 2019-03-04 DIAGNOSIS — E11.42 DIABETIC POLYNEUROPATHY ASSOCIATED WITH TYPE 2 DIABETES MELLITUS (HCC): ICD-10-CM

## 2019-03-04 DIAGNOSIS — Z79.4 TYPE 2 DIABETES MELLITUS WITH HYPERGLYCEMIA, WITH LONG-TERM CURRENT USE OF INSULIN (HCC): Primary | ICD-10-CM

## 2019-03-04 DIAGNOSIS — E11.65 TYPE 2 DIABETES MELLITUS WITH HYPERGLYCEMIA, WITH LONG-TERM CURRENT USE OF INSULIN (HCC): Primary | ICD-10-CM

## 2019-03-04 DIAGNOSIS — Z79.4 TYPE 2 DIABETES MELLITUS WITH HYPERGLYCEMIA, WITH LONG-TERM CURRENT USE OF INSULIN (HCC): ICD-10-CM

## 2019-03-04 DIAGNOSIS — I10 BENIGN ESSENTIAL HYPERTENSION: ICD-10-CM

## 2019-03-04 DIAGNOSIS — E66.01 MORBID OBESITY WITH BMI OF 50.0-59.9, ADULT (HCC): ICD-10-CM

## 2019-03-04 PROBLEM — K22.70 BARRETT'S ESOPHAGUS WITHOUT DYSPLASIA: Status: ACTIVE | Noted: 2019-03-04

## 2019-03-04 PROBLEM — K21.00 GASTROESOPHAGEAL REFLUX DISEASE WITH ESOPHAGITIS: Status: RESOLVED | Noted: 2018-05-23 | Resolved: 2019-03-04

## 2019-03-04 LAB
ALBUMIN SERPL BCP-MCNC: 2.7 G/DL (ref 3.5–5)
ALP SERPL-CCNC: 119 U/L (ref 46–116)
ALT SERPL W P-5'-P-CCNC: 19 U/L (ref 12–78)
ANION GAP SERPL CALCULATED.3IONS-SCNC: 9 MMOL/L (ref 4–13)
AST SERPL W P-5'-P-CCNC: 14 U/L (ref 5–45)
BILIRUB SERPL-MCNC: 0.2 MG/DL (ref 0.2–1)
BUN SERPL-MCNC: 19 MG/DL (ref 5–25)
CALCIUM SERPL-MCNC: 8.9 MG/DL (ref 8.3–10.1)
CHLORIDE SERPL-SCNC: 102 MMOL/L (ref 100–108)
CHOLEST SERPL-MCNC: 100 MG/DL (ref 50–200)
CO2 SERPL-SCNC: 26 MMOL/L (ref 21–32)
CREAT SERPL-MCNC: 1.62 MG/DL (ref 0.6–1.3)
EST. AVERAGE GLUCOSE BLD GHB EST-MCNC: 214 MG/DL
GFR SERPL CREATININE-BSD FRML MDRD: 56 ML/MIN/1.73SQ M
GLUCOSE P FAST SERPL-MCNC: 239 MG/DL (ref 65–99)
HBA1C MFR BLD: 9.1 % (ref 4.2–6.3)
HDLC SERPL-MCNC: 34 MG/DL (ref 40–60)
LDLC SERPL CALC-MCNC: 28 MG/DL (ref 0–100)
POTASSIUM SERPL-SCNC: 4.3 MMOL/L (ref 3.5–5.3)
PROT SERPL-MCNC: 7.6 G/DL (ref 6.4–8.2)
SODIUM SERPL-SCNC: 137 MMOL/L (ref 136–145)
TRIGL SERPL-MCNC: 191 MG/DL

## 2019-03-04 PROCEDURE — 83036 HEMOGLOBIN GLYCOSYLATED A1C: CPT

## 2019-03-04 PROCEDURE — 80053 COMPREHEN METABOLIC PANEL: CPT

## 2019-03-04 PROCEDURE — 36415 COLL VENOUS BLD VENIPUNCTURE: CPT

## 2019-03-04 PROCEDURE — 99214 OFFICE O/P EST MOD 30 MIN: CPT | Performed by: HOSPITALIST

## 2019-03-04 PROCEDURE — 80061 LIPID PANEL: CPT

## 2019-03-04 NOTE — ASSESSMENT & PLAN NOTE
Patient is aware that his weight continues to increase on a steady basis  Patient admits he does not do any exercise  As noted in all previous visits patient declines to make any changes to his diet  Patient has declined all referrals to weight management

## 2019-03-04 NOTE — ASSESSMENT & PLAN NOTE
As reviewed again today blood pressure is not well controlled  By patient's own admission he is not taking his blood pressure medicines on a daily basis  We discussed the importance of taking every day not only for his heart but also for his progressively worsening kidney function

## 2019-03-04 NOTE — ASSESSMENT & PLAN NOTE
We discussed again that his kidney function has continued to decline on each of his follow-up blood test   We reviewed that he is now at stage III stage IV would be preparation and stage 5 his dialysis  We reviewed that patient nor I want him to progress to the stage but the only way this can improve is if he actually makes the dietary changes and takes his medication as prescribed

## 2019-03-04 NOTE — ASSESSMENT & PLAN NOTE
Lab Results   Component Value Date    HGBA1C 9 1 (H) 03/04/2019       As reviewed with patient he had gone for his labs this morning and results were pending at the time of his visit  Based on his numbers is A1c is likely to be about the same as it was previously in the 9-10 range  We discussed the importance of getting better control as patient admits he does not take his insulin every day and is not checking his sugars on a regular basis not even daily  Discussed with him that the only way we can safely give him the rapid acting insulin on a per meal basis is for him to check his sugars before every meal and he must keep a log to track these  Patient was given a a glucose log and advised to turn this in in 1 month and then I will be able to advise him on the number of units he will need per meal   Suspect dosing is going to be very different per meal as patient meets very erratically without consistency  At this time patient was advised that if his fasting sugar before eating is less than 150 he is not to take any rapid acting insulin at all  Patient will continue with his basal units

## 2019-03-04 NOTE — PATIENT INSTRUCTIONS
Obesity   AMBULATORY CARE:   Obesity  is when your body mass index (BMI) is greater than 30  Your healthcare provider will use your height and weight to measure your BMI  The risks of obesity include  many health problems, such as injuries or physical disability  You may need tests to check for the following:  · Diabetes     · High blood pressure or high cholesterol     · Heart disease     · Gallbladder or liver disease     · Cancer of the colon, breast, prostate, liver, or kidney     · Sleep apnea     · Arthritis or gout  Seek care immediately if:   · You have a severe headache, confusion, or difficulty speaking  · You have weakness on one side of your body  · You have chest pain, sweating, or shortness of breath  Contact your healthcare provider if:   · You have symptoms of gallbladder or liver disease, such as pain in your upper abdomen  · You have knee or hip pain and discomfort while walking  · You have symptoms of diabetes, such as intense hunger and thirst, and frequent urination  · You have symptoms of sleep apnea, such as snoring or daytime sleepiness  · You have questions or concerns about your condition or care  Treatment for obesity  focuses on helping you lose weight to improve your health  Even a small decrease in BMI can reduce the risk for many health problems  Your healthcare provider will help you set a weight-loss goal   · Lifestyle changes  are the first step in treating obesity  These include making healthy food choices and getting regular physical activity  Your healthcare provider may suggest a weight-loss program that involves coaching, education, and therapy  · Medicine  may help you lose weight when it is used with a healthy diet and physical activity  · Surgery  can help you lose weight if you are very obese and have other health problems  There are several types of weight-loss surgery  Ask your healthcare provider for more information    Be successful losing weight:   · Set small, realistic goals  An example of a small goal is to walk for 20 minutes 5 days a week  Anther goal is to lose 5% of your body weight  · Tell friends, family members, and coworkers about your goals  and ask for their support  Ask a friend to lose weight with you, or join a weight-loss support group  · Identify foods or triggers that may cause you to overeat , and find ways to avoid them  Remove tempting high-calorie foods from your home and workplace  Place a bowl of fresh fruit on your kitchen counter  If stress causes you to eat, then find other ways to cope with stress  · Keep a diary to track what you eat and drink  Also write down how many minutes of physical activity you do each day  Weigh yourself once a week and record it in your diary  Eating changes: You will need to eat 500 to 1,000 fewer calories each day than you currently eat to lose 1 to 2 pounds a week  The following changes will help you cut calories:  · Eat smaller portions  Use small plates, no larger than 9 inches in diameter  Fill your plate half full of fruits and vegetables  Measure your food using measuring cups until you know what a serving size looks like  · Eat 3 meals and 1 or 2 snacks each day  Plan your meals in advance  Beto Linn and eat at home most of the time  Eat slowly  · Eat fruits and vegetables at every meal   They are low in calories and high in fiber, which makes you feel full  Do not add butter, margarine, or cream sauce to vegetables  Use herbs to season steamed vegetables  · Eat less fat and fewer fried foods  Eat more baked or grilled chicken and fish  These protein sources are lower in calories and fat than red meat  Limit fast food  Dress your salads with olive oil and vinegar instead of bottled dressing  · Limit the amount of sugar you eat  Do not drink sugary beverages  Limit alcohol  Activity changes:  Physical activity is good for your body in many ways   It helps you burn calories and build strong muscles  It decreases stress and depression, and improves your mood  It can also help you sleep better  Talk to your healthcare provider before you begin an exercise program   · Exercise for at least 30 minutes 5 days a week  Start slowly  Set aside time each day for physical activity that you enjoy and that is convenient for you  It is best to do both weight training and an activity that increases your heart rate, such as walking, bicycling, or swimming  · Find ways to be more active  Do yard work and housecleaning  Walk up the stairs instead of using elevators  Spend your leisure time going to events that require walking, such as outdoor festivals or fairs  This extra physical activity can help you lose weight and keep it off  Follow up with your healthcare provider as directed: You may need to meet with a dietitian  Write down your questions so you remember to ask them during your visits  © 2017 [address] [employer] Information is for End User's use only and may not be sold, redistributed or otherwise used for commercial purposes  All illustrations and images included in CareNotes® are the copyrighted property of [employer]  or Chris Grady  The above information is an  only  It is not intended as medical advice for individual conditions or treatments  Talk to your doctor, nurse or pharmacist before following any medical regimen to see if it is safe and effective for you  Low Fat Diet   AMBULATORY CARE:   A low-fat diet  is an eating plan that is low in total fat, unhealthy fat, and cholesterol  You may need to follow a low-fat diet if you have trouble digesting or absorbing fat  You may also need to follow this diet if you have high cholesterol  You can also lower your cholesterol by increasing the amount of fiber in your diet  Soluble fiber is a type of fiber that helps to decrease cholesterol levels     Different types of fat in food:   · Limit unhealthy fats  A diet that is high in cholesterol, saturated fat, and trans fat may cause unhealthy cholesterol levels  Unhealthy cholesterol levels increase your risk of heart disease  ¨ Cholesterol:  Limit intake of cholesterol to less than 200 mg per day  Cholesterol is found in meat, eggs, and dairy  ¨ Saturated fat:  Limit saturated fat to less than 7% of your total daily calories  Ask your dietitian how many calories you need each day  Saturated fat is found in butter, cheese, ice cream, whole milk, and palm oil  Saturated fat is also found in meat, such as beef, pork, chicken skin, and processed meats  Processed meats include sausage, hot dogs, and bologna  ¨ Trans fat:  Avoid trans fat as much as possible  Trans fat is used in fried and baked foods  Foods that say trans fat free on the label may still have up to 0 5 grams of trans fat per serving  · Include healthy fats  Replace foods that are high in saturated and trans fat with foods high in healthy fats  This may help to decrease high cholesterol levels  ¨ Monounsaturated fats: These are found in avocados, nuts, and vegetable oils, such as olive, canola, and sunflower oil  ¨ Polyunsaturated fats: These can be found in vegetable oils, such as soybean or corn oil  Omega-3 fats can help to decrease the risk of heart disease  Omega-3 fats are found in fish, such as salmon, herring, trout, and tuna  Omega-3 fats can also be found in plant foods, such as walnuts, flaxseed, soybeans, and canola oil    Foods to limit or avoid:   · Grains:      ¨ Snacks that are made with partially hydrogenated oils, such as chips, regular crackers, and butter-flavored popcorn    ¨ High-fat baked goods, such as biscuits, croissants, doughnuts, pies, cookies, and pastries    · Dairy:      ¨ Whole milk, 2% milk, and yogurt and ice cream made with whole milk    ¨ Half and half creamer, heavy cream, and whipping cream    ¨ Cheese, cream cheese, and sour cream    · Meats and proteins:      ¨ High-fat cuts of meat (T-bone steak, regular hamburger, and ribs)    ¨ Fried meat, poultry (turkey and chicken), and fish    ¨ Poultry (chicken and turkey) with skin    ¨ Cold cuts (salami or bologna), hot dogs, allen, and sausage    ¨ Whole eggs and egg yolks    · Vegetables and fruits with added fat:      ¨ Fried vegetables or vegetables in butter or high-fat sauces, such as cream or cheese sauces    ¨ Fried fruit or fruit served with butter or cream    · Fats:      ¨ Butter, stick margarine, and shortening    ¨ Coconut, palm oil, and palm kernel oil  Foods to include:   · Grains:      ¨ Whole-grain breads, cereals, pasta, and brown rice    ¨ Low-fat crackers and pretzels    · Vegetables and fruits:      ¨ Fresh, frozen, or canned vegetables (no salt or low-sodium)    ¨ Fresh, frozen, dried, or canned fruit (canned in light syrup or fruit juice)    ¨ Avocado    · Low-fat dairy products:      ¨ Nonfat (skim) or 1% milk    ¨ Nonfat or low-fat cheese, yogurt, and cottage cheese    · Meats and proteins:      ¨ Chicken or turkey with no skin    ¨ Baked or broiled fish    ¨ Lean beef and pork (loin, round, extra lean hamburger)    ¨ Beans and peas, unsalted nuts, soy products    ¨ Egg whites and substitutes    ¨ Seeds and nuts    · Fats:      ¨ Unsaturated oil, such as canola, olive, peanut, soybean, or sunflower oil    ¨ Soft or liquid margarine and vegetable oil spread    ¨ Low-fat salad dressing  Other ways to decrease fat:   · Read food labels before you buy foods  Choose foods that have less than 30% of calories from fat  Choose low-fat or fat-free dairy products  Remember that fat free does not mean calorie free  These foods still contain calories, and too many calories can lead to weight gain  · Trim fat from meat and avoid fried food  Trim all visible fat from meat before you cook it  Remove the skin from poultry  Do not phelps meat, fish, or poultry  Bake, roast, boil, or broil these foods instead  Avoid fried foods  Eat a baked potato instead of [place] fries  Steam vegetables instead of sautéing them in butter  · Add less fat to foods  Use imitation allen bits on salads and baked potatoes instead of regular allen bits  Use fat-free or low-fat salad dressings instead of regular dressings  Use low-fat or nonfat butter-flavored topping instead of regular butter or margarine on popcorn and other foods  Ways to decrease fat in recipes:  Replace high-fat ingredients with low-fat or nonfat ones  This may cause baked goods to be drier than usual  You may need to use nonfat cooking spray on pans to prevent food from sticking  You also may need to change the amount of other ingredients, such as water, in the recipe  Try the following:  · Use low-fat or light margarine instead of regular margarine or shortening  · Use lean ground turkey breast or chicken, or lean ground beef (less than 5% fat) instead of hamburger  · Add 1 teaspoon of canola oil to 8 ounces of skim milk instead of using cream or half and half  · Use grated zucchini, carrots, or apples in breads instead of coconut  · Use blenderized, low-fat cottage cheese, plain tofu, or low-fat ricotta cheese instead of cream cheese  · Use 1 egg white and 1 teaspoon of canola oil, or use ¼ cup (2 ounces) of fat-free egg substitute instead of a whole egg  · Replace half of the oil that is called for in a recipe with applesauce when you bake  Use 3 tablespoons of cocoa powder and 1 tablespoon of canola oil instead of a square of baking chocolate  How to increase fiber:  Eat enough high-fiber foods to get 20 to 30 grams of fiber every day  Slowly increase your fiber intake to avoid stomach cramps, gas, and other problems  · Eat 3 ounces of whole-grain foods each day  An ounce is about 1 slice of bread  Eat whole-grain breads, such as whole-wheat bread   Whole wheat, whole-wheat flour, or other whole grains should be listed as the first ingredient on the food label  Replace white flour with whole-grain flour or use half of each in recipes  Whole-grain flour is heavier than white flour, so you may have to add more yeast or baking powder  · Eat a high-fiber cereal for breakfast   Oatmeal is a good source of soluble fiber  Look for cereals that have bran or fiber in the name  Choose whole-grain products, such as brown rice, barley, and whole-wheat pasta  · Eat more beans, peas, and lentils  For example, add beans to soups or salads  Eat at least 5 cups of fruits and vegetables each day  Eat fruits and vegetables with the peel because the peel is high in fiber  © 2017 [address] [employer] Information is for End User's use only and may not be sold, redistributed or otherwise used for commercial purposes  All illustrations and images included in CareNotes® are the copyrighted property of A D A M [employer]  or Chris Grady  The above information is an  only  It is not intended as medical advice for individual conditions or treatments  Talk to your doctor, nurse or pharmacist before following any medical regimen to see if it is safe and effective for you  Heart Healthy Diet   AMBULATORY CARE:   A heart healthy diet  is an eating plan low in total fat, unhealthy fats, and sodium (salt)  A heart healthy diet helps decrease your risk for heart disease and stroke  Limit the amount of fat you eat to 25% to 35% of your total daily calories  Limit sodium to less than 2,300 mg each day  Healthy fats:  Healthy fats can help improve cholesterol levels  The risk for heart disease is decreased when cholesterol levels are normal  Choose healthy fats, such as the following:  · Unsaturated fat  is found in foods such as soybean, canola, olive, corn, and safflower oils  It is also found in soft tub margarine that is made with liquid vegetable oil       · Omega-3 fat  is found in certain fish, such as salmon, tuna, and trout, and in walnuts and flaxseed  Unhealthy fats:  Unhealthy fats can cause unhealthy cholesterol levels in your blood and increase your risk of heart disease  Limit unhealthy fats, such as the following:  · Cholesterol  is found in animal foods, such as eggs and lobster, and in dairy products made from whole milk  Limit cholesterol to less than 300 milligrams (mg) each day  You may need to limit cholesterol to 200 mg each day if you have heart disease  · Saturated fat  is found in meats, such as allen and hamburger  It is also found in chicken or turkey skin, whole milk, and butter  Limit saturated fat to less than 7% of your total daily calories  Limit saturated fat to less than 6% if you have heart disease or are at increased risk for it  · Trans fat  is found in packaged foods, such as potato chips and cookies  It is also in hard margarine, some fried foods, and shortening  Avoid trans fats as much as possible    Heart healthy foods and drinks to include:  Ask your dietitian or healthcare provider how many servings to have from each of the following food groups:  · Grains:      ¨ Whole-wheat breads, cereals, and pastas, and brown rice    ¨ Low-fat, low-sodium crackers and chips    · Vegetables:      ¨ Broccoli, green beans, green peas, and spinach    ¨ Collards, kale, and lima beans    ¨ Carrots, sweet potatoes, tomatoes, and peppers    ¨ Canned vegetables with no salt added    · Fruits:      ¨ Bananas, peaches, pears, and pineapple    ¨ Grapes, raisins, and dates    ¨ Oranges, tangerines, grapefruit, orange juice, and grapefruit juice    ¨ Apricots, mangoes, melons, and papaya    ¨ Raspberries and strawberries    ¨ Canned fruit with no added sugar    · Low-fat dairy products:      ¨ Nonfat (skim) milk, 1% milk, and low-fat almond, cashew, or soy milks fortified with calcium    ¨ Low-fat cheese, regular or frozen yogurt, and cottage cheese    · Meats and proteins , such as lean cuts of beef and pork (loin, leg, round), skinless chicken and turkey, legumes, soy products, egg whites, and nuts  Foods and drinks to limit or avoid:  Ask your dietitian or healthcare provider about these and other foods that are high in unhealthy fat, sodium, and sugar:  · Snack or packaged foods , such as frozen dinners, cookies, macaroni and cheese, and cereals with more than 300 mg of sodium per serving    · Canned or dry mixes  for cakes, soups, sauces, or gravies    · Vegetables with added sodium , such as instant potatoes, vegetables with added sauces, or regular canned vegetables    · Other foods high in sodium , such as ketchup, barbecue sauce, salad dressing, pickles, olives, soy sauce, and miso    · High-fat dairy foods  such as whole or 2% milk, cream cheese, or sour cream, and cheeses     · High-fat protein foods  such as high-fat cuts of beef (T-bone steaks, ribs), chicken or turkey with skin, and organ meats, such as liver    · Cured or smoked meats , such as hot dogs, allen, and sausage    · Unhealthy fats and oils , such as butter, stick margarine, shortening, and cooking oils such as coconut or palm oil    · Food and drinks high in sugar , such as soft drinks (soda), sports drinks, sweetened tea, candy, cake, cookies, pies, and doughnuts  Other diet guidelines to follow:   · Eat more foods containing omega-3 fats  Eat fish high in omega-3 fats at least 2 times a week  · Limit alcohol  Too much alcohol can damage your heart and raise your blood pressure  Women should limit alcohol to 1 drink a day  Men should limit alcohol to 2 drinks a day  A drink of alcohol is 12 ounces of beer, 5 ounces of wine, or 1½ ounces of liquor  · Choose low-sodium foods  High-sodium foods can lead to high blood pressure  Add little or no salt to food you prepare  Use herbs and spices in place of salt  · Eat more fiber  to help lower cholesterol levels   Eat at least 5 servings of fruits and vegetables each day  Eat 3 ounces of whole-grain foods each day  Legumes (beans) are also a good source of fiber  Lifestyle guidelines:   · Do not smoke  Nicotine and other chemicals in cigarettes and cigars can cause lung and heart damage  Ask your healthcare provider for information if you currently smoke and need help to quit  E-cigarettes or smokeless tobacco still contain nicotine  Talk to your healthcare provider before you use these products  · Exercise regularly  to help you maintain a healthy weight and improve your blood pressure and cholesterol levels  Ask your healthcare provider about the best exercise plan for you  Do not start an exercise program without asking your healthcare provider  Follow up with your healthcare provider as directed:  Write down your questions so you remember to ask them during your visits  © 2017 [address] [facility] Information is for End User's use only and may not be sold, redistributed or otherwise used for commercial purposes  All illustrations and images included in CareNotes® are the copyrighted property of A D A M , [employer]  or Chris Miguel A  The above information is an  only  It is not intended as medical advice for individual conditions or treatments  Talk to your doctor, nurse or pharmacist before following any medical regimen to see if it is safe and effective for you  Calorie Counting Diet   WHAT YOU NEED TO KNOW:   What is a calorie counting diet? It is a meal plan based on counting calories each day to reach a healthy body weight  You will need to eat fewer calories if you are trying to lose weight  Weight loss may decrease your risk for certain health problems or improve your health if you have health problems  Some of these health problems include heart disease, high blood pressure, and diabetes  What foods should I avoid?   Your dietitian will tell you if you need to avoid certain foods based on your body weight and health condition  You may need to avoid high-fat foods if you are at risk for or have heart disease  You may need to eat fewer foods from the breads and starches food group if you have diabetes  How many calories are in foods? The following is a list of foods and drinks with the approximate number of calories in each  Check the food label to find the exact number of calories  A dietitian can tell you how many calories you should have from each food group each day    · Carbohydrate:      ¨ ½ of a 3-inch bagel, 1 slice of bread, or ½ of a hamburger bun or hot dog bun (80)    ¨ 1 (8-inch) flour tortilla or ½ cup of cooked rice (100)    ¨ 1 (6-inch) corn tortilla (80)    ¨ 1 (6-inch) pancake or 1 cup of bran flakes cereal (110)    ¨ ½ cup of cooked cereal (80)    ¨ ½ cup of cooked pasta (85)    ¨ 1 ounce of pretzels (100)    ¨ 3 cups of air-popped popcorn without butter or oil (80)    · Dairy:      ¨ 1 cup of skim or 1% milk (90)    ¨ 1 cup of 2% milk (120)    ¨ 1 cup of whole milk (160)    ¨ 1 cup of 2% chocolate milk (220)    ¨ 1 ounce of low-fat cheese with 3 grams of fat per ounce (70)    ¨ 1 ounce of cheddar cheese (114)    ¨ ½ cup of 1% fat cottage cheese (80)    ¨ 1 cup of plain or sugar-free, fat-free yogurt (90)    · Protein foods:      ¨ 3 ounces of fish (not breaded or fried) (95)    ¨ 3 ounces of breaded, fried fish (195)    ¨ ¾ cup of tuna canned in water (105)    ¨ 3 ounces of chicken breast without skin (105)    ¨ 1 fried chicken breast with skin (350)    ¨ ¼ cup of fat free egg substitute (40)    ¨ 1 large egg (75)    ¨ 3 ounces of lean beef or pork (165)    ¨ 3 ounces of fried pork chop or ham (185)    ¨ ½ cup of cooked dried beans, such as kidney, diallo, lentils, or navy (115)    ¨ 3 ounces of bologna or lunch meat (225)    ¨ 2 links of breakfast sausage (140)    · Vegetables:      ¨ ½ cup of sliced mushrooms (10)    ¨ 1 cup of salad greens, such as lettuce, spinach, or bart (15)    ¨ ½ cup of steamed asparagus (20)    ¨ ½ cup of cooked summer squash, zucchini squash, or green or wax beans (25)    ¨ 1 cup of broccoli or cauliflower florets, or 1 medium tomato (25)    ¨ 1 large raw carrot or ½ cup of cooked carrots (40)    ¨ ? of a medium cucumber or 1 stalk of celery (5)    ¨ 1 small baked potato (160)    ¨ 1 cup of breaded, fried vegetables (230)    · Fruit:      ¨ 1 (6-inch) banana (55)     ¨ ½ of a 4-inch grapefruit (55)    ¨ 15 grapes (60)    ¨ 1 medium orange or apple (70)    ¨ 1 large peach (65)    ¨ 1 cup of fresh pineapple chunks (75)    ¨ 1 cup of melon cubes (50)    ¨ 1¼ cups of whole strawberries (45)    ¨ ½ cup of fruit canned in juice (55)    ¨ ½ cup of fruit canned in heavy syrup (110)    ¨ ?  cup of raisins (130)    ¨ ½ cup of unsweetened fruit juice (60)    ¨ ½ cup of grape, cranberry, or prune juice (90)    · Fat:      ¨ 10 peanuts or 2 teaspoons of peanut butter (55)    ¨ 2 tablespoons of avocado or 1 tablespoon of regular salad dressing (45)    ¨ 2 slices of allen (90)    ¨ 1 teaspoon of oil, such as safflower, canola, corn, or olive oil (45)    ¨ 2 teaspoons of low-fat margarine, or 1 tablespoon of low-fat mayonnaise (50)    ¨ 1 teaspoon of regular margarine (40)    ¨ 1 tablespoon of regular mayonnaise (135)    ¨ 1 tablespoon of cream cheese or 2 tablespoons of low-fat cream cheese (45)    ¨ 2 tablespoons of vegetable shortening (215)    · Dessert and sweets:      ¨ 8 animal crackers or 5 vanilla wafers (80)    ¨ 1 frozen fruit juice bar (80)    ¨ ½ cup of ice milk or low-fat frozen yogurt (90)    ¨ ½ cup of sherbet or sorbet (125)    ¨ ½ cup of sugar-free pudding or custard (60)    ¨ ½ cup of ice cream (140)    ¨ ½ cup of pudding or custard (175)    ¨ 1 (2-inch) square chocolate brownie (185)    · Combination foods:      ¨ Bean burrito made with an 8-inch tortilla, without cheese (275)    ¨ Chicken breast sandwich with lettuce and tomato (325)    ¨ 1 cup of chicken noodle soup (60)    ¨ 1 beef taco (175)    ¨ Regular hamburger with lettuce and tomato (310)    ¨ Regular cheeseburger with lettuce and tomato (410)     ¨ ¼ of a 12-inch cheese pizza (280)    ¨ Fried fish sandwich with lettuce and tomato (425)    ¨ Hot dog and bun (275)    ¨ 1½ cups of macaroni and cheese (310)    ¨ Taco salad with a fried tortilla shell (870)    · Low-calorie foods:      ¨ 1 tablespoon of ketchup or 1 tablespoon of fat free sour cream (15)    ¨ 1 teaspoon of mustard (5)    ¨ ¼ cup of salsa (20)    ¨ 1 large dill pickle (15)    ¨ 1 tablespoon of fat free salad dressing (10)    ¨ 2 teaspoons of low-sugar, light jam or jelly, or 1 tablespoon of sugar-free syrup (15)    ¨ 1 sugar-free popsicle (15)    ¨ 1 cup of club soda, seltzer water, or diet soda (0)  CARE AGREEMENT:   You have the right to help plan your care  Discuss treatment options with your caregivers to decide what care you want to receive  You always have the right to refuse treatment  The above information is an  only  It is not intended as medical advice for individual conditions or treatments  Talk to your doctor, nurse or pharmacist before following any medical regimen to see if it is safe and effective for you  © 2017 [employer] Information is for End User's use only and may not be sold, redistributed or otherwise used for commercial purposes  All illustrations and images included in CareNotes® are the copyrighted property of [employer]  or Chris Grady

## 2019-03-04 NOTE — PROGRESS NOTES
Assessment/Plan:    Type 2 diabetes mellitus with hyperglycemia (HCC)  Lab Results   Component Value Date    HGBA1C 9 1 (H) 03/04/2019       As reviewed with patient he had gone for his labs this morning and results were pending at the time of his visit  Based on his numbers is A1c is likely to be about the same as it was previously in the 9-10 range  We discussed the importance of getting better control as patient admits he does not take his insulin every day and is not checking his sugars on a regular basis not even daily  Discussed with him that the only way we can safely give him the rapid acting insulin on a per meal basis is for him to check his sugars before every meal and he must keep a log to track these  Patient was given a a glucose log and advised to turn this in in 1 month and then I will be able to advise him on the number of units he will need per meal   Suspect dosing is going to be very different per meal as patient meets very erratically without consistency  At this time patient was advised that if his fasting sugar before eating is less than 150 he is not to take any rapid acting insulin at all  Patient will continue with his basal units  Benign essential hypertension  As reviewed again today blood pressure is not well controlled  By patient's own admission he is not taking his blood pressure medicines on a daily basis  We discussed the importance of taking every day not only for his heart but also for his progressively worsening kidney function  Stage 3 chronic kidney disease (HCC)  We discussed again that his kidney function has continued to decline on each of his follow-up blood test   We reviewed that he is now at stage III stage IV would be preparation and stage 5 his dialysis  We reviewed that patient nor I want him to progress to the stage but the only way this can improve is if he actually makes the dietary changes and takes his medication as prescribed      Morbid obesity with BMI of 50 0-59 9, adult [facility])  Patient is aware that his weight continues to increase on a steady basis  Patient admits he does not do any exercise  As noted in all previous visits patient declines to make any changes to his diet  Patient has declined all referrals to weight management  Diagnoses and all orders for this visit:    Type 2 diabetes mellitus with hyperglycemia, with long-term current use of insulin ([facility])  -     Hemoglobin A1C; Future  -     Comprehensive metabolic panel; Future    Benign essential hypertension    Stage 3 chronic kidney disease ([facility] [address] )  -     Ambulatory referral to Nephrology; Future    Morbid obesity with BMI of 50 0-59 9, adult (HCC)    Diabetic polyneuropathy associated with type 2 diabetes mellitus (HCC)          Subjective:      Patient ID: Sofi Albert is a 47 y o  male  Patient is here to review labs  HbgA1C and microalbuminuria are pending  Patient will complete urine after visit  Cholesterol and LDL remain within normal limits  Triglycerides continue to increase  152 in May 2018, today 191  Patient's renal function continues to decline  Cr went from 1 42 in November, to 1 62 today  Reviewed with patient importance of getting DM under control  Discussed the importance of patient with his kidney function declining with every test that has been done  We discussed that he is now at stage III and if he does not work to remember to take his medications and insulin on a daily basis this can progress and ultimately require dialysis  Discussed with patient that it is able to stabilize and even possibly improved kidney function but only if he gets his diabetes under better control and takes his medications on a daily basis  Liver function is within normal limits  Patient is taking insulin about every other day  If patient eats out he takes it when he gets home but often forgets  He will eat at [facility] every other day for clyde saavedra in morning with breakfast, 35 units with dinner of humalog  42 units of lantus at dinner  Patient will take sugar once or twice a week  His sugars "have not bee the greatest " patient reports the highest was 435, admits to carb heavy meal consisting of pancakes, syrup and chip beef grave on top  Patient states he eats fried food only when goes out  Chicken grilled or baked  [place] fries and lots of carbs  Reviewed healthy diet with patient to cut down on simple carbohydrates and less greasy foods  Patient does not wish to change his diet  Patient was then asking to increase the frequency of his rapid acting insulin  We reviewed that unless he is checking his sugars before every meal it could potentially be very unsafe creating a hypoglycemic event  We reviewed currently with his sugars being so elevated less likely to happen but if he does actually become compliant with medications and insulin and make any diet changes at all if his sugars improves then taking the rapid acting without actually checking prior to the meal could create a health risk  Patient is taking gabapentin  Helping  Patient takes amlodipine and lisiniprol HCTZ every other day  When he does take his pills he takes them all  Patient takes care of his 5year old daughter and will forgot to take them some mornings  He is taking omeprazole daily for 4-5 months  Helping a lot  Takes every night  He still does have some upper abdominal pain during the day  He is following with GI  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  Patient uses CPAP every night  Podiatrist 4 weeks ago  Cataracts  Patient follows with [facility] for sight and last diabetic eye exam showing retinopathy was February of 2018  Patient aware he needs to call and schedule an appointment  Once again patient declines to have eye exam through the iris scanner in the office        Patient does follow Mental Health for bipolar disorder  Patient states he has been stable on his current medications  Patient adamantly denies any suicidal ideation or thoughts that he would be better off dead  Patient's motivation is his wife and small daughter  Discussed that patient's behaviors are not matching his statements as he admits he does not want to make any changes to his medications and takes his blood pressure and insulin when he thinks he wants to are needs to  Diabetes   He presents for his follow-up diabetic visit  He has type 2 diabetes mellitus  His disease course has been worsening  Pertinent negatives for hypoglycemia include no confusion, dizziness, headaches, sweats or tremors  Pertinent negatives for diabetes include no blurred vision, no chest pain, no fatigue, no foot paresthesias, no polydipsia, no polyphagia, no polyuria and no visual change  There are no hypoglycemic complications  Symptoms are worsening  Diabetic complications include nephropathy and peripheral neuropathy  Risk factors for coronary artery disease include obesity, male sex, hypertension, sedentary lifestyle and diabetes mellitus  Current diabetic treatment includes insulin injections  He is compliant with treatment some of the time  His weight is stable  He is following a generally unhealthy diet  When asked about meal planning, he reported none  He never participates in exercise  An ACE inhibitor/angiotensin II receptor blocker is being taken  He sees a podiatrist       The following portions of the patient's history were reviewed and updated as appropriate: allergies, current medications, past family history, past medical history, past social history, past surgical history and problem list     Review of Systems   Constitutional: Negative  Negative for activity change, appetite change, fatigue and unexpected weight change  HENT: Negative  Eyes: Negative  Negative for blurred vision and visual disturbance  Respiratory: Negative  Negative for chest tightness and shortness of breath  Cardiovascular: Negative  Negative for chest pain, palpitations and leg swelling  Gastrointestinal: Negative  Negative for abdominal pain, diarrhea and nausea  No pain with taking omeprazole   Endocrine: Negative  Negative for polydipsia, polyphagia and polyuria  Genitourinary: Negative  Negative for frequency and urgency  Musculoskeletal: Positive for back pain  Skin: Negative for color change and rash  Neurological: Positive for numbness  Negative for dizziness, tremors, light-headedness and headaches  Psychiatric/Behavioral: Negative  Negative for confusion  Continues to follow with  for Bipolar         Objective:      /80 (BP Location: Left arm, Patient Position: Sitting, Cuff Size: Large)   Pulse 100   Temp 98 °F (36 7 °C) (Oral)   Ht 5' 6 5" (1 689 m)   Wt (!) 152 kg (335 lb 1 6 oz)   BMI 53 28 kg/m²          Physical Exam   Constitutional: He is oriented to person, place, and time  He appears well-developed and well-nourished  No distress  HENT:   Head: Normocephalic and atraumatic  Eyes: EOM are normal  Right eye exhibits no discharge  Left eye exhibits no discharge  Neck: Neck supple  Carotid bruit is not present  No thyromegaly present  Cardiovascular: Regular rhythm, normal heart sounds and normal pulses  Tachycardia present  Pulses:       Radial pulses are 2+ on the right side, and 2+ on the left side  Dorsalis pedis pulses are 2+ on the right side, and 2+ on the left side  1+ pitting edema to bilateral lower extremities  Pulmonary/Chest: Effort normal and breath sounds normal  No stridor  No respiratory distress  He has no wheezes  He has no rales  Abdominal: Soft  Bowel sounds are normal  There is no tenderness  Exam limited by body habitus  Musculoskeletal: He exhibits edema  Neurological: He is alert and oriented to person, place, and time  He displays normal reflexes  Skin: Skin is warm, dry and intact  He is not diaphoretic  Psychiatric: He has a normal mood and affect  His behavior is normal  Judgment and thought content normal    Nursing note and vitals reviewed  BMI Counseling: Body mass index is 53 28 kg/m²  Discussed the patient's BMI with him  The BMI is above average  BMI counseling and education was provided to the patient  Nutrition recommendations include reducing portion sizes, decreasing overall calorie intake, 3-5 servings of fruits/vegetables daily, reducing fast food intake, consuming healthier snacks, decreasing soda and/or juice intake, moderation in carbohydrate intake, reducing intake of saturated fat and trans fat and reducing intake of cholesterol  Exercise recommendations include exercising 3-5 times per week

## 2019-03-09 DIAGNOSIS — F31.62 BIPOLAR DISORDER, CURRENT EPISODE MIXED, MODERATE (HCC): ICD-10-CM

## 2019-03-11 RX ORDER — BUPROPION HYDROCHLORIDE 100 MG/1
TABLET ORAL
Qty: 90 TABLET | Refills: 0 | OUTPATIENT
Start: 2019-03-11

## 2019-03-15 ENCOUNTER — OFFICE VISIT (OUTPATIENT)
Dept: BEHAVIORAL/MENTAL HEALTH CLINIC | Facility: CLINIC | Age: 54
End: 2019-03-15
Payer: MEDICARE

## 2019-03-15 DIAGNOSIS — G47.00 INSOMNIA, UNSPECIFIED TYPE: ICD-10-CM

## 2019-03-15 DIAGNOSIS — F31.60 BIPOLAR AFFECTIVE DISORDER, CURRENT EPISODE MIXED, CURRENT EPISODE SEVERITY UNSPECIFIED (HCC): Primary | ICD-10-CM

## 2019-03-15 DIAGNOSIS — F41.1 GENERALIZED ANXIETY DISORDER: ICD-10-CM

## 2019-03-15 PROCEDURE — 90834 PSYTX W PT 45 MINUTES: CPT | Performed by: SOCIAL WORKER
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
## 2019-03-21 ENCOUNTER — TELEPHONE (OUTPATIENT)
Dept: INTERNAL MEDICINE CLINIC | Facility: CLINIC | Age: 54
End: 2019-03-21

## 2019-03-22 DIAGNOSIS — E11.65 TYPE 2 DIABETES MELLITUS WITH HYPERGLYCEMIA, WITH LONG-TERM CURRENT USE OF INSULIN (HCC): Primary | ICD-10-CM

## 2019-03-22 DIAGNOSIS — Z79.4 TYPE 2 DIABETES MELLITUS WITH HYPERGLYCEMIA, WITH LONG-TERM CURRENT USE OF INSULIN (HCC): Primary | ICD-10-CM

## 2019-03-22 RX ORDER — BLOOD-GLUCOSE METER
KIT MISCELLANEOUS 3 TIMES DAILY
Qty: 1 EACH | Refills: 0 | Status: SHIPPED | OUTPATIENT
Start: 2019-03-22

## 2019-03-26 ENCOUNTER — OFFICE VISIT (OUTPATIENT)
Dept: BEHAVIORAL/MENTAL HEALTH CLINIC | Facility: CLINIC | Age: 54
End: 2019-03-26
Payer: MEDICARE

## 2019-03-26 DIAGNOSIS — G47.00 INSOMNIA, UNSPECIFIED TYPE: ICD-10-CM

## 2019-03-26 DIAGNOSIS — F31.60 BIPOLAR AFFECTIVE DISORDER, CURRENT EPISODE MIXED, CURRENT EPISODE SEVERITY UNSPECIFIED (HCC): Primary | ICD-10-CM

## 2019-03-26 DIAGNOSIS — F41.1 GENERALIZED ANXIETY DISORDER: ICD-10-CM

## 2019-03-26 PROCEDURE — 90834 PSYTX W PT 45 MINUTES: CPT | Performed by: SOCIAL WORKER

## 2019-03-26 NOTE — PSYCH
Treatment Plan Tracking    # His updatedTreatment Plan not completed within required time limits due toit was due 2/8  3/15 was his first session since that date but he was too upset about the deaths of his friend and his cousin so we agreed to do it today  He wanted his goals to remain the same  Client presented with emotional/behavorial issues that required clinical intervention  on 3/15 so we updated it today  Baylee Marino

## 2019-03-26 NOTE — PSYCH
Sheryl Simms  1965       Date of Initial Treatment Plan: 05/07/2018  Date of Current Treatment Plan: 03/26/19    Treatment Plan Number 2nd update     Strengths/Personal Resources for Self Care: kind, helpful,Passion for life    Diagnosis:   Bipolar mixed, Panic disorder    Area of Needs: I still need to improve my overall health and I still need to perfect my skills in dealing with difficult people  Long Term Goal 1: I still need to improve my overall health    Target Date:07/26/2019  Completion Date:TBD          Short Term Objectives for Goal 1: healthy nutrition and exercising    Long Term Goal 2: I still need to perfect my skills in dealing with difficult people    Target Date: 07/26/2019  Completion Date: TBD    Short Term Objectives for Goal 2: mindfulness, distress tolerance, radical acceptance and anger management         Long Term Goal # 3: N/A     Target Date: N/A  Completion Date: N/A    Short Term Objectives for Goal 3: N/A    GOAL 1: Modality: Individual 2x per month   Completion Date tbd    GOAL 2: Modality: Individual 2x per month   Completion Date tbd     GOAL 3: Modality: Individual n/ax per month   Completion Date n/a      Behavioral Health Treatment Plan [facility]: Diagnosis and Treatment Plan explained to Rockdionicio Oliveirady relates understanding diagnosis and is agreeable to Treatment Plan         Client Comments : Please share your thoughts, feelings, need and/or experiences regarding your treatment plan:       __________________________________________________________________    __________________________________________________________________    __________________________________________________________________    __________________________________________________________________    _______________________________________                Patient signature, Date Time: __________________________________________             Physician cosigner signature, Date, Time: ________________________________

## 2019-03-26 NOTE — PSYCH
Psychotherapy Provided: Individual Psychotherapy 45 minutes     Length of time in session: 45 minutes, follow up in 2 week    Goals addressed in session: Goal 1 and Goal 2     Pain:      moderate to severe    0    Current suicide risk : Low     Data: Grayson Hart arrived for his session  He shared he really does not have anyone to talk to  He shared he is still dealing with the deaths of one of his best friends and of one of his cousins  He did discuss his recovery goals  Last time he was here we did not do his update because he was upset about the deaths of his friend and his cousin  He claims he is eating better and he tries to get out to do some exercise  Regarding goal 2 he is coping and dealing more effectively with difficult people  He gave examples of this  He wants his goals to remain the same  Assessment: Overall Grayson Hart is doing well with his goals but he currently is dealing with the 2 recent deaths of a friend and a cousin  He does try to use mindfulness skills we discuss in session  Plan: Continue to help him skill build in distress tolerance  Behavioral Health Treatment Plan [facility]: Diagnosis and Treatment Plan explained to Dom Galeano relates understanding diagnosis and is agreeable to Treatment Plan   Yes

## 2019-04-01 ENCOUNTER — TELEPHONE (OUTPATIENT)
Dept: INTERNAL MEDICINE CLINIC | Facility: CLINIC | Age: 54
End: 2019-04-01

## 2019-04-02 ENCOUNTER — TELEPHONE (OUTPATIENT)
Dept: INTERNAL MEDICINE CLINIC | Facility: CLINIC | Age: 54
End: 2019-04-02

## 2019-04-04 ENCOUNTER — TELEPHONE (OUTPATIENT)
Dept: INTERNAL MEDICINE CLINIC | Facility: CLINIC | Age: 54
End: 2019-04-04

## 2019-04-07 DIAGNOSIS — G47.00 INSOMNIA, UNSPECIFIED TYPE: ICD-10-CM

## 2019-04-08 RX ORDER — TRAZODONE HYDROCHLORIDE 50 MG/1
TABLET ORAL
Qty: 90 TABLET | Refills: 0 | OUTPATIENT
Start: 2019-04-08

## 2019-04-09 DIAGNOSIS — E11.65 TYPE 2 DIABETES MELLITUS WITH HYPERGLYCEMIA, UNSPECIFIED WHETHER LONG TERM INSULIN USE (HCC): ICD-10-CM

## 2019-04-12 ENCOUNTER — OFFICE VISIT (OUTPATIENT)
Dept: PSYCHIATRY | Facility: CLINIC | Age: 54
End: 2019-04-12
Payer: MEDICARE

## 2019-04-12 VITALS — BODY MASS INDEX: 50.62 KG/M2 | WEIGHT: 315 LBS | HEIGHT: 66 IN

## 2019-04-12 DIAGNOSIS — E11.42 DIABETIC POLYNEUROPATHY ASSOCIATED WITH TYPE 2 DIABETES MELLITUS (HCC): ICD-10-CM

## 2019-04-12 DIAGNOSIS — G47.00 INSOMNIA, UNSPECIFIED TYPE: ICD-10-CM

## 2019-04-12 DIAGNOSIS — F41.1 GENERALIZED ANXIETY DISORDER: Primary | ICD-10-CM

## 2019-04-12 DIAGNOSIS — E66.01 MORBID OBESITY WITH BMI OF 50.0-59.9, ADULT (HCC): ICD-10-CM

## 2019-04-12 DIAGNOSIS — F31.62 BIPOLAR DISORDER, CURRENT EPISODE MIXED, MODERATE (HCC): ICD-10-CM

## 2019-04-12 DIAGNOSIS — F31.60 BIPOLAR AFFECTIVE DISORDER, CURRENT EPISODE MIXED, CURRENT EPISODE SEVERITY UNSPECIFIED (HCC): ICD-10-CM

## 2019-04-12 PROCEDURE — 99213 OFFICE O/P EST LOW 20 MIN: CPT | Performed by: PHYSICIAN ASSISTANT

## 2019-04-12 RX ORDER — TRAZODONE HYDROCHLORIDE 50 MG/1
50 TABLET ORAL
Qty: 90 TABLET | Refills: 0 | Status: SHIPPED | OUTPATIENT
Start: 2019-04-12 | End: 2019-06-07 | Stop reason: SDUPTHER

## 2019-04-12 RX ORDER — BUPROPION HYDROCHLORIDE 100 MG/1
100 TABLET ORAL DAILY
Qty: 90 TABLET | Refills: 0 | Status: SHIPPED | OUTPATIENT
Start: 2019-04-12 | End: 2019-06-07 | Stop reason: SDUPTHER

## 2019-04-12 RX ORDER — BUSPIRONE HYDROCHLORIDE 10 MG/1
10 TABLET ORAL 2 TIMES DAILY
Qty: 180 TABLET | Refills: 0 | Status: SHIPPED | OUTPATIENT
Start: 2019-04-12 | End: 2019-06-07 | Stop reason: SDUPTHER

## 2019-04-17 ENCOUNTER — TELEPHONE (OUTPATIENT)
Dept: MULTI SPECIALTY CLINIC | Facility: CLINIC | Age: 54
End: 2019-04-17

## 2019-04-17 ENCOUNTER — CONSULT (OUTPATIENT)
Dept: NEPHROLOGY | Facility: CLINIC | Age: 54
End: 2019-04-17
Payer: MEDICARE

## 2019-04-17 VITALS
SYSTOLIC BLOOD PRESSURE: 132 MMHG | BODY MASS INDEX: 49.44 KG/M2 | DIASTOLIC BLOOD PRESSURE: 70 MMHG | WEIGHT: 315 LBS | HEIGHT: 67 IN

## 2019-04-17 DIAGNOSIS — R79.89 ELEVATED SERUM CREATININE: ICD-10-CM

## 2019-04-17 DIAGNOSIS — E11.65 TYPE 2 DIABETES MELLITUS WITH HYPERGLYCEMIA, UNSPECIFIED WHETHER LONG TERM INSULIN USE (HCC): ICD-10-CM

## 2019-04-17 DIAGNOSIS — E11.65 TYPE 2 DIABETES MELLITUS WITH HYPERGLYCEMIA, WITH LONG-TERM CURRENT USE OF INSULIN (HCC): Primary | ICD-10-CM

## 2019-04-17 DIAGNOSIS — Z79.4 TYPE 2 DIABETES MELLITUS WITH HYPERGLYCEMIA, WITH LONG-TERM CURRENT USE OF INSULIN (HCC): Primary | ICD-10-CM

## 2019-04-17 DIAGNOSIS — N18.2 CKD (CHRONIC KIDNEY DISEASE) STAGE 2, GFR 60-89 ML/MIN: Primary | ICD-10-CM

## 2019-04-17 DIAGNOSIS — I10 BENIGN ESSENTIAL HYPERTENSION: ICD-10-CM

## 2019-04-17 DIAGNOSIS — N18.30 STAGE 3 CHRONIC KIDNEY DISEASE (HCC): ICD-10-CM

## 2019-04-17 PROCEDURE — 99204 OFFICE O/P NEW MOD 45 MIN: CPT | Performed by: INTERNAL MEDICINE

## 2019-04-18 ENCOUNTER — TRANSCRIBE ORDERS (OUTPATIENT)
Dept: LAB | Facility: CLINIC | Age: 54
End: 2019-04-18

## 2019-04-18 ENCOUNTER — APPOINTMENT (OUTPATIENT)
Dept: LAB | Facility: CLINIC | Age: 54
End: 2019-04-18
Payer: MEDICARE

## 2019-04-18 DIAGNOSIS — N18.2 CKD (CHRONIC KIDNEY DISEASE) STAGE 2, GFR 60-89 ML/MIN: ICD-10-CM

## 2019-04-18 LAB
ANION GAP SERPL CALCULATED.3IONS-SCNC: 7 MMOL/L (ref 4–13)
BUN SERPL-MCNC: 15 MG/DL (ref 5–25)
CALCIUM SERPL-MCNC: 9.3 MG/DL (ref 8.3–10.1)
CHLORIDE SERPL-SCNC: 101 MMOL/L (ref 100–108)
CO2 SERPL-SCNC: 28 MMOL/L (ref 21–32)
CREAT SERPL-MCNC: 1.48 MG/DL (ref 0.6–1.3)
GFR SERPL CREATININE-BSD FRML MDRD: 62 ML/MIN/1.73SQ M
GLUCOSE P FAST SERPL-MCNC: 187 MG/DL (ref 65–99)
POTASSIUM SERPL-SCNC: 4.2 MMOL/L (ref 3.5–5.3)
SODIUM SERPL-SCNC: 136 MMOL/L (ref 136–145)

## 2019-04-18 PROCEDURE — 36415 COLL VENOUS BLD VENIPUNCTURE: CPT

## 2019-04-18 PROCEDURE — 80048 BASIC METABOLIC PNL TOTAL CA: CPT

## 2019-04-26 ENCOUNTER — HOSPITAL ENCOUNTER (OUTPATIENT)
Dept: ULTRASOUND IMAGING | Facility: HOSPITAL | Age: 54
Discharge: HOME | End: 2019-04-26
Attending: INTERNAL MEDICINE
Payer: MEDICARE

## 2019-04-26 DIAGNOSIS — N18.2 CKD (CHRONIC KIDNEY DISEASE) STAGE 2, GFR 60-89 ML/MIN: ICD-10-CM

## 2019-04-26 PROCEDURE — 76770 US EXAM ABDO BACK WALL COMP: CPT

## 2019-04-27 DIAGNOSIS — I10 BENIGN ESSENTIAL HYPERTENSION: ICD-10-CM

## 2019-04-29 RX ORDER — AMLODIPINE BESYLATE 5 MG/1
TABLET ORAL
Qty: 90 TABLET | Refills: 0 | Status: SHIPPED | OUTPATIENT
Start: 2019-04-29 | End: 2019-07-24 | Stop reason: SDUPTHER

## 2019-04-29 RX ORDER — LISINOPRIL AND HYDROCHLOROTHIAZIDE 25; 20 MG/1; MG/1
TABLET ORAL
Qty: 90 TABLET | Refills: 0 | Status: SHIPPED | OUTPATIENT
Start: 2019-04-29 | End: 2019-07-24 | Stop reason: SDUPTHER

## 2019-05-02 ENCOUNTER — OFFICE VISIT (OUTPATIENT)
Dept: BEHAVIORAL/MENTAL HEALTH CLINIC | Facility: CLINIC | Age: 54
End: 2019-05-02
Payer: MEDICARE

## 2019-05-02 DIAGNOSIS — G47.00 INSOMNIA, UNSPECIFIED TYPE: ICD-10-CM

## 2019-05-02 DIAGNOSIS — F41.1 GENERALIZED ANXIETY DISORDER: ICD-10-CM

## 2019-05-02 DIAGNOSIS — F31.60 BIPOLAR AFFECTIVE DISORDER, CURRENT EPISODE MIXED, CURRENT EPISODE SEVERITY UNSPECIFIED (HCC): Primary | ICD-10-CM

## 2019-05-02 PROCEDURE — 90834 PSYTX W PT 45 MINUTES: CPT | Performed by: SOCIAL WORKER

## 2019-05-09 LAB
LEFT EYE DIABETIC RETINOPATHY: NORMAL
RIGHT EYE DIABETIC RETINOPATHY: NORMAL

## 2019-05-10 DIAGNOSIS — F41.0 PANIC ATTACKS: ICD-10-CM

## 2019-05-10 DIAGNOSIS — E11.42 DIABETIC POLYNEUROPATHY ASSOCIATED WITH TYPE 2 DIABETES MELLITUS (HCC): ICD-10-CM

## 2019-05-10 RX ORDER — GABAPENTIN 300 MG/1
CAPSULE ORAL
Qty: 180 CAPSULE | Refills: 0 | Status: SHIPPED | OUTPATIENT
Start: 2019-05-10 | End: 2019-08-05 | Stop reason: SDUPTHER

## 2019-05-10 RX ORDER — CLONAZEPAM 1 MG/1
1 TABLET ORAL DAILY PRN
Qty: 30 TABLET | Refills: 0 | Status: SHIPPED | OUTPATIENT
Start: 2019-05-10 | End: 2019-06-07 | Stop reason: SDUPTHER

## 2019-05-14 DIAGNOSIS — K44.9 HIATAL HERNIA: ICD-10-CM

## 2019-05-14 DIAGNOSIS — K21.00 GASTROESOPHAGEAL REFLUX DISEASE WITH ESOPHAGITIS: ICD-10-CM

## 2019-05-14 DIAGNOSIS — K22.2 LOWER ESOPHAGEAL RING (SCHATZKI): ICD-10-CM

## 2019-05-14 RX ORDER — OMEPRAZOLE 20 MG/1
CAPSULE, DELAYED RELEASE ORAL
Qty: 90 CAPSULE | Refills: 0 | Status: SHIPPED | OUTPATIENT
Start: 2019-05-14 | End: 2019-08-09 | Stop reason: SDUPTHER

## 2019-05-16 NOTE — PROGRESS NOTES
OVERDUE RESULTS REMINDER  Patient is overdue for fit testing ordered by Elizabeth Lr in May 2018  Script expires in two days  Patient has upcoming appt on 6/14/19 with Elizabeth Lr, new script can be given during appt

## 2019-06-04 ENCOUNTER — OFFICE VISIT (OUTPATIENT)
Dept: BEHAVIORAL/MENTAL HEALTH CLINIC | Facility: CLINIC | Age: 54
End: 2019-06-04
Payer: MEDICARE

## 2019-06-04 DIAGNOSIS — F31.60 BIPOLAR AFFECTIVE DISORDER, CURRENT EPISODE MIXED, CURRENT EPISODE SEVERITY UNSPECIFIED (HCC): Primary | ICD-10-CM

## 2019-06-04 DIAGNOSIS — G47.00 INSOMNIA, UNSPECIFIED TYPE: ICD-10-CM

## 2019-06-04 DIAGNOSIS — F41.1 GENERALIZED ANXIETY DISORDER: ICD-10-CM

## 2019-06-04 PROCEDURE — 90834 PSYTX W PT 45 MINUTES: CPT | Performed by: SOCIAL WORKER

## 2019-06-07 ENCOUNTER — OFFICE VISIT (OUTPATIENT)
Dept: PSYCHIATRY | Facility: CLINIC | Age: 54
End: 2019-06-07
Payer: MEDICARE

## 2019-06-07 VITALS — WEIGHT: 315 LBS | HEIGHT: 66 IN | BODY MASS INDEX: 50.62 KG/M2

## 2019-06-07 DIAGNOSIS — E66.01 MORBID OBESITY WITH BMI OF 50.0-59.9, ADULT (HCC): ICD-10-CM

## 2019-06-07 DIAGNOSIS — E11.65 TYPE 2 DIABETES MELLITUS WITH HYPERGLYCEMIA, WITH LONG-TERM CURRENT USE OF INSULIN (HCC): ICD-10-CM

## 2019-06-07 DIAGNOSIS — F41.1 GENERALIZED ANXIETY DISORDER: ICD-10-CM

## 2019-06-07 DIAGNOSIS — G47.00 INSOMNIA, UNSPECIFIED TYPE: ICD-10-CM

## 2019-06-07 DIAGNOSIS — F31.62 BIPOLAR DISORDER, CURRENT EPISODE MIXED, MODERATE (HCC): Primary | ICD-10-CM

## 2019-06-07 DIAGNOSIS — Z79.4 TYPE 2 DIABETES MELLITUS WITH HYPERGLYCEMIA, WITH LONG-TERM CURRENT USE OF INSULIN (HCC): ICD-10-CM

## 2019-06-07 DIAGNOSIS — F41.0 PANIC ATTACKS: ICD-10-CM

## 2019-06-07 PROCEDURE — 99214 OFFICE O/P EST MOD 30 MIN: CPT | Performed by: PHYSICIAN ASSISTANT

## 2019-06-07 RX ORDER — CLONAZEPAM 1 MG/1
1 TABLET ORAL DAILY PRN
Qty: 30 TABLET | Refills: 1 | Status: SHIPPED | OUTPATIENT
Start: 2019-06-07 | End: 2019-09-13 | Stop reason: SDUPTHER

## 2019-06-07 RX ORDER — TRAZODONE HYDROCHLORIDE 50 MG/1
50 TABLET ORAL
Qty: 90 TABLET | Refills: 0 | Status: SHIPPED | OUTPATIENT
Start: 2019-06-07 | End: 2019-09-13 | Stop reason: SDUPTHER

## 2019-06-07 RX ORDER — BUPROPION HYDROCHLORIDE 100 MG/1
100 TABLET ORAL DAILY
Qty: 90 TABLET | Refills: 0 | Status: SHIPPED | OUTPATIENT
Start: 2019-06-07 | End: 2019-09-13 | Stop reason: SDUPTHER

## 2019-06-07 RX ORDER — BUSPIRONE HYDROCHLORIDE 10 MG/1
10 TABLET ORAL 2 TIMES DAILY
Qty: 180 TABLET | Refills: 0 | Status: SHIPPED | OUTPATIENT
Start: 2019-06-07 | End: 2019-09-13 | Stop reason: SDUPTHER

## 2019-06-12 ENCOUNTER — TELEPHONE (OUTPATIENT)
Dept: INTERNAL MEDICINE CLINIC | Facility: CLINIC | Age: 54
End: 2019-06-12

## 2019-06-22 ENCOUNTER — APPOINTMENT (OUTPATIENT)
Dept: LAB | Facility: CLINIC | Age: 54
End: 2019-06-22
Payer: MEDICARE

## 2019-06-22 DIAGNOSIS — Z79.4 TYPE 2 DIABETES MELLITUS WITH HYPERGLYCEMIA, WITH LONG-TERM CURRENT USE OF INSULIN (HCC): ICD-10-CM

## 2019-06-22 DIAGNOSIS — E11.65 TYPE 2 DIABETES MELLITUS WITH HYPERGLYCEMIA, WITH LONG-TERM CURRENT USE OF INSULIN (HCC): ICD-10-CM

## 2019-06-22 LAB
ALBUMIN SERPL BCP-MCNC: 2.8 G/DL (ref 3.5–5)
ALP SERPL-CCNC: 109 U/L (ref 46–116)
ALT SERPL W P-5'-P-CCNC: 18 U/L (ref 12–78)
ANION GAP SERPL CALCULATED.3IONS-SCNC: 5 MMOL/L (ref 4–13)
AST SERPL W P-5'-P-CCNC: 14 U/L (ref 5–45)
BILIRUB SERPL-MCNC: 0.2 MG/DL (ref 0.2–1)
BUN SERPL-MCNC: 16 MG/DL (ref 5–25)
CALCIUM SERPL-MCNC: 8.4 MG/DL (ref 8.3–10.1)
CHLORIDE SERPL-SCNC: 103 MMOL/L (ref 100–108)
CO2 SERPL-SCNC: 28 MMOL/L (ref 21–32)
CREAT SERPL-MCNC: 1.62 MG/DL (ref 0.6–1.3)
EST. AVERAGE GLUCOSE BLD GHB EST-MCNC: 237 MG/DL
GFR SERPL CREATININE-BSD FRML MDRD: 55 ML/MIN/1.73SQ M
GLUCOSE P FAST SERPL-MCNC: 262 MG/DL (ref 65–99)
HBA1C MFR BLD: 9.9 % (ref 4.2–6.3)
POTASSIUM SERPL-SCNC: 4.3 MMOL/L (ref 3.5–5.3)
PROT SERPL-MCNC: 7.2 G/DL (ref 6.4–8.2)
SODIUM SERPL-SCNC: 136 MMOL/L (ref 136–145)

## 2019-06-22 PROCEDURE — 36415 COLL VENOUS BLD VENIPUNCTURE: CPT

## 2019-06-22 PROCEDURE — 83036 HEMOGLOBIN GLYCOSYLATED A1C: CPT

## 2019-06-22 PROCEDURE — 80053 COMPREHEN METABOLIC PANEL: CPT

## 2019-06-25 ENCOUNTER — OFFICE VISIT (OUTPATIENT)
Dept: INTERNAL MEDICINE CLINIC | Facility: CLINIC | Age: 54
End: 2019-06-25

## 2019-06-25 VITALS
HEART RATE: 88 BPM | DIASTOLIC BLOOD PRESSURE: 80 MMHG | TEMPERATURE: 98 F | BODY MASS INDEX: 49.44 KG/M2 | HEIGHT: 67 IN | WEIGHT: 315 LBS | SYSTOLIC BLOOD PRESSURE: 114 MMHG

## 2019-06-25 DIAGNOSIS — Z79.4 TYPE 2 DIABETES MELLITUS WITH HYPERGLYCEMIA, WITH LONG-TERM CURRENT USE OF INSULIN (HCC): Primary | ICD-10-CM

## 2019-06-25 DIAGNOSIS — N18.2 CKD (CHRONIC KIDNEY DISEASE) STAGE 2, GFR 60-89 ML/MIN: ICD-10-CM

## 2019-06-25 DIAGNOSIS — E11.65 TYPE 2 DIABETES MELLITUS WITH HYPERGLYCEMIA, WITH LONG-TERM CURRENT USE OF INSULIN (HCC): Primary | ICD-10-CM

## 2019-06-25 DIAGNOSIS — F31.60 BIPOLAR AFFECTIVE DISORDER, CURRENT EPISODE MIXED, CURRENT EPISODE SEVERITY UNSPECIFIED (HCC): ICD-10-CM

## 2019-06-25 DIAGNOSIS — I10 BENIGN ESSENTIAL HYPERTENSION: ICD-10-CM

## 2019-06-25 DIAGNOSIS — E11.65 TYPE 2 DIABETES MELLITUS WITH HYPERGLYCEMIA, UNSPECIFIED WHETHER LONG TERM INSULIN USE (HCC): ICD-10-CM

## 2019-06-25 PROCEDURE — 99213 OFFICE O/P EST LOW 20 MIN: CPT | Performed by: PHYSICIAN ASSISTANT

## 2019-07-11 DIAGNOSIS — F31.62 BIPOLAR DISORDER, CURRENT EPISODE MIXED, MODERATE (HCC): ICD-10-CM

## 2019-07-11 RX ORDER — LURASIDONE HYDROCHLORIDE 120 MG/1
TABLET, FILM COATED ORAL
Qty: 90 TABLET | Refills: 0 | OUTPATIENT
Start: 2019-07-11

## 2019-07-18 ENCOUNTER — SOCIAL WORK (OUTPATIENT)
Dept: BEHAVIORAL/MENTAL HEALTH CLINIC | Facility: CLINIC | Age: 54
End: 2019-07-18
Payer: MEDICARE

## 2019-07-18 DIAGNOSIS — F41.1 GENERALIZED ANXIETY DISORDER: ICD-10-CM

## 2019-07-18 DIAGNOSIS — G47.00 INSOMNIA, UNSPECIFIED TYPE: ICD-10-CM

## 2019-07-18 DIAGNOSIS — F31.60 BIPOLAR AFFECTIVE DISORDER, CURRENT EPISODE MIXED, CURRENT EPISODE SEVERITY UNSPECIFIED (HCC): Primary | ICD-10-CM

## 2019-07-18 PROCEDURE — 90834 PSYTX W PT 45 MINUTES: CPT | Performed by: SOCIAL WORKER
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
## 2019-07-23 ENCOUNTER — OFFICE VISIT (OUTPATIENT)
Dept: MULTI SPECIALTY CLINIC | Facility: CLINIC | Age: 54
End: 2019-07-23

## 2019-07-23 VITALS
HEIGHT: 67 IN | BODY MASS INDEX: 49.44 KG/M2 | SYSTOLIC BLOOD PRESSURE: 120 MMHG | DIASTOLIC BLOOD PRESSURE: 84 MMHG | WEIGHT: 315 LBS | HEART RATE: 88 BPM | TEMPERATURE: 98.7 F

## 2019-07-23 DIAGNOSIS — B35.1 ONYCHOMYCOSIS: ICD-10-CM

## 2019-07-23 DIAGNOSIS — E11.65 TYPE 2 DIABETES MELLITUS WITH HYPERGLYCEMIA, WITH LONG-TERM CURRENT USE OF INSULIN (HCC): ICD-10-CM

## 2019-07-23 DIAGNOSIS — Z79.4 TYPE 2 DIABETES MELLITUS WITH HYPERGLYCEMIA, WITH LONG-TERM CURRENT USE OF INSULIN (HCC): ICD-10-CM

## 2019-07-23 PROCEDURE — 11721 DEBRIDE NAIL 6 OR MORE: CPT | Performed by: PODIATRIST

## 2019-07-23 PROCEDURE — 99212 OFFICE O/P EST SF 10 MIN: CPT | Performed by: PODIATRIST

## 2019-07-23 NOTE — PATIENT INSTRUCTIONS
Tendinitis   WHAT YOU NEED TO KNOW:   Tendinitis is painful inflammation or breakdown of your tendons  It may also be called tendinopathy  Tendinitis often occurs in the knee, shoulder, ankle, hip, or elbow  DISCHARGE INSTRUCTIONS:   Medicines:   · Pain medicines  such as acetaminophen or NSAIDs may decrease swelling and pain or fever  These medicines are available without a doctor's order  Ask which medicine to take  Ask how much to take and when to take it  Follow directions  Acetaminophen and NSAIDs can cause liver or kidney damage if not taken correctly  If you take blood thinner medicine, always ask your healthcare provider if NSAIDs are safe for you  Always read the medicine label and follow the directions on it before using these medicine  · Take your medicine as directed  Contact your healthcare provider if you think your medicine is not helping or if you have side effects  Tell him if you are allergic to any medicine  Keep a list of the medicines, vitamins, and herbs you take  Include the amounts, and when and why you take them  Bring the list or the pill bottles to follow-up visits  Carry your medicine list with you in case of an emergency  Management:   · Rest  your tendon as directed to help it heal  Ask your healthcare provider if you need to stop putting weight on your affected area  · Ice  helps decrease swelling and pain  Ice may also help prevent tissue damage  Use an ice pack, or put crushed ice in a plastic bag  Cover it with a towel and place it on the affected area for 10 to 15 minutes every hour or as directed  · Support devices  such as a cane, splint, shoe insert, or brace may help reduce your pain  · Physical therapy  may be ordered by your healthcare provider  This may be used to teach you exercises to help improve movement and strength, and to decrease pain  You may also learn how to improve your posture, and how to lift or exercise correctly    Prevention:   · Stretch and warm up  before you exercise  · Exercise regularly  to strengthen the muscles around your joint  Ease into an exercise routine for the first 3 weeks to prevent another injury  Ask your healthcare provider about the best exercise plan for you  Rest fully between activities  · Use the right equipment  for sports and exercise  Wear braces or tape around weak joints as directed  Follow up with your healthcare provider as directed:  Write down your questions so you remember to ask them during your visits  Contact your healthcare provider if:   · You have increased pain even after you take medicine  · You have questions or concerns about your condition or care  Return to the emergency department if:   · You have increased redness over the joint, or swelling in the joint  · You suddenly cannot move your joint  © 2017 [employer]  Information is for End User's use only and may not be sold, redistributed or otherwise used for commercial purposes  All illustrations and images included in CareNotes® are the copyrighted property of [employer]  or Chris Grady  The above information is an  only  It is not intended as medical advice for individual conditions or treatments  Talk to your doctor, nurse or pharmacist before following any medical regimen to see if it is safe and effective for you

## 2019-07-23 NOTE — PROGRESS NOTES
Assessment/Plan:    No problem-specific Assessment & Plan notes found for this encounter  Diagnoses and all orders for this visit:    Type 2 diabetes mellitus with hyperglycemia, with long-term current use of insulin ([facility])    - LA class 0 with low risk for amputation  - He was educated on diabetic foot care and may trim nails on his own, have a family member trim them, or see a   - He was educated on the importance of tight glucose control, and treatment by his primary care physician  - He will RTC in 6 months or sooner if necessary      Onychomycosis  - Nails debrided x10 debrided all non-viable tissue without incident  - He may continue palliation of the nails  - medications are not indicated at this time         Subjective:      Patient ID: Miguel Ovalles is a 49 y o  male  He is a diabetic who states he checks his blood sugar and it fluctuates in the high 200's  His last A1c was 9 9%  He acknowledges tingling in his feet but states his primary care physician is currently titrating his gabapentin prescription  He would like his nails to be trimmed more frequently  He denies N/V/F/C/SOB      The following portions of the patient's history were reviewed and updated as appropriate:   He  has a past medical history of ADHD, adult residual type, Cataract, Depression, Diabetes mellitus ([address] ), Equinus deformity of foot, GERD (gastroesophageal reflux disease), Homicidal ideations, Hyperlipidemia, Hypertension, Microalbuminuria, Morbid obesity ([address] ), Neuropathy, Sleep apnea, and Suicidal intent    He   Patient Active Problem List    Diagnosis Date Noted    Elevated serum creatinine 04/17/2019    CKD (chronic kidney disease) stage 2, GFR 60-89 ml/min 04/17/2019    Tolbert's esophagus without dysplasia 03/04/2019    DAISY (obstructive sleep apnea)     Generalized anxiety disorder 06/19/2018    Hiatal hernia 06/17/2018    Lower esophageal ring ([place]) 06/17/2018    Insomnia 05/14/2018    Bipolar affective disorder, current episode mixed ([place] [address] ) 11/06/2017    Flat foot 05/03/2016    Morbid obesity with BMI of 50 0-59 9, adult ([place] [address] ) 04/12/2016    Type 2 diabetes mellitus with hyperglycemia ([place] [address] ) 12/11/2014    Diabetic polyneuropathy ([place] [address] ) 12/11/2014    Benign essential hypertension 12/10/2012    Allergic rhinitis 08/31/2012     He  has a past surgical history that includes Hand surgery (Right); Other surgical history; Cataract extraction; pr esophagogastroduodenoscopy transoral diagnostic (N/A, 6/14/2018); and pr esophagogastroduodenoscopy transoral diagnostic (N/A, 9/12/2018)  His family history includes Alcohol abuse in his family, father, and sister; Depression in his sister; Diabetes in his father and mother; Hypertension in his father and mother  He  reports that he has quit smoking  His smoking use included cigarettes  His smokeless tobacco use includes chew  He reports that he drinks alcohol  He reports that he does not use drugs       Review of Systems   Constitutional: Negative for fever  Respiratory: Negative for shortness of breath  Cardiovascular: Negative for chest pain  Musculoskeletal: Negative for arthralgias  Skin: Negative for wound  Elongated and thickened nails bilaterally  Neurological: Negative for numbness  Tingling of feet bilaterally         Objective:      /84 (BP Location: Left arm, Patient Position: Sitting, Cuff Size: Large)   Pulse 88   Temp 98 7 °F (37 1 °C) (Oral)   Ht 5' 6 6" (1 692 m)   Wt (!) 162 kg (357 lb 2 3 oz)   BMI 56 61 kg/m²          Physical Exam   Cardiovascular: Intact distal pulses  Musculoskeletal:   No gross deformity noted  Equinus noted bilaterally  Neurological:   Monofilament 10/10 bilaterally  Gross sensation is intact  Protective sensation is intact   Skin:   Feet are warm to touch  No open lesions  Lower leg shows lipidema secondary to increase body habitus

## 2019-07-24 DIAGNOSIS — I10 BENIGN ESSENTIAL HYPERTENSION: ICD-10-CM

## 2019-07-24 RX ORDER — AMLODIPINE BESYLATE 5 MG/1
TABLET ORAL
Qty: 90 TABLET | Refills: 0 | Status: SHIPPED | OUTPATIENT
Start: 2019-07-24 | End: 2019-10-18 | Stop reason: SDUPTHER

## 2019-07-24 RX ORDER — LISINOPRIL AND HYDROCHLOROTHIAZIDE 25; 20 MG/1; MG/1
TABLET ORAL
Qty: 90 TABLET | Refills: 0 | Status: SHIPPED | OUTPATIENT
Start: 2019-07-24 | End: 2019-10-18 | Stop reason: SDUPTHER

## 2019-07-25 ENCOUNTER — DOCUMENTATION (OUTPATIENT)
Dept: PSYCHIATRY | Facility: CLINIC | Age: 54
End: 2019-07-25

## 2019-07-25 ENCOUNTER — OFFICE VISIT (OUTPATIENT)
Dept: INTERNAL MEDICINE CLINIC | Facility: CLINIC | Age: 54
End: 2019-07-25

## 2019-07-25 VITALS
HEIGHT: 67 IN | HEART RATE: 100 BPM | SYSTOLIC BLOOD PRESSURE: 126 MMHG | TEMPERATURE: 98 F | WEIGHT: 315 LBS | DIASTOLIC BLOOD PRESSURE: 80 MMHG | BODY MASS INDEX: 49.44 KG/M2

## 2019-07-25 DIAGNOSIS — I10 BENIGN ESSENTIAL HYPERTENSION: ICD-10-CM

## 2019-07-25 DIAGNOSIS — E11.65 TYPE 2 DIABETES MELLITUS WITH HYPERGLYCEMIA, WITH LONG-TERM CURRENT USE OF INSULIN (HCC): Primary | ICD-10-CM

## 2019-07-25 DIAGNOSIS — E11.65 TYPE 2 DIABETES MELLITUS WITH HYPERGLYCEMIA, UNSPECIFIED WHETHER LONG TERM INSULIN USE (HCC): ICD-10-CM

## 2019-07-25 DIAGNOSIS — Z79.4 TYPE 2 DIABETES MELLITUS WITH HYPERGLYCEMIA, WITH LONG-TERM CURRENT USE OF INSULIN (HCC): Primary | ICD-10-CM

## 2019-07-25 PROCEDURE — 99213 OFFICE O/P EST LOW 20 MIN: CPT | Performed by: PHYSICIAN ASSISTANT

## 2019-07-25 RX ORDER — INSULIN LISPRO 100 [IU]/ML
INJECTION, SOLUTION INTRAVENOUS; SUBCUTANEOUS
Qty: 93 ML | Refills: 5 | OUTPATIENT
Start: 2019-07-25

## 2019-07-25 RX ORDER — ATORVASTATIN CALCIUM 10 MG/1
10 TABLET, FILM COATED ORAL DAILY
Qty: 90 TABLET | Refills: 1 | Status: SHIPPED | OUTPATIENT
Start: 2019-07-25 | End: 2020-01-27

## 2019-07-25 NOTE — PATIENT INSTRUCTIONS
As discussed you will increase your Lantus to 50 units in the morning and 50 units in the evening  We are increasing your Humalog to 32 units before each meal   We also reviewed that if you get readings in the morning that are consistently below 90 to contact our office and not wait the full month to bring the logs in  We also discussed how your numbers greatly very from time of day and between each day  We reviewed that your insulin is the same amount each time therefore what changes is what you ate and your physical activity that day  Reviewed importance of working to space her meals fairly evenly and attempt to eat a similar calorie count and carbohydrate count at each meal   This will help get your sugars under better control  Continue your sugar logs and bring to the office in 1 month  Script provided for your follow-up labs as dated  I also refilled your cholesterol medication today    Please also schedule follow-up appointment with the endocrinologist

## 2019-07-25 NOTE — PROGRESS NOTES
Assessment/Plan:  As discussed you will increase your Lantus to 50 units in the morning and 50 units in the evening  We are increasing your Humalog to 32 units before each meal   We also reviewed that if you get readings in the morning that are consistently below 90 to contact our office and not wait the full month to bring the logs in  We also discussed how your numbers greatly very from time of day and between each day  We reviewed that your insulin is the same amount each time therefore what changes is what you ate and your physical activity that day  Reviewed importance of working to space her meals fairly evenly and attempt to eat a similar calorie count and carbohydrate count at each meal   This will help get your sugars under better control  Continue your sugar logs and bring to the office in 1 month  Script provided for your follow-up labs as dated  I also refilled your cholesterol medication today  Please also schedule follow-up appointment with the endocrinologist       No problem-specific Assessment & Plan notes found for this encounter  Diagnoses and all orders for this visit:    Type 2 diabetes mellitus with hyperglycemia, with long-term current use of insulin ([facility] )  -     Comprehensive metabolic panel; Future  -     Hemoglobin A1C; Future  -     Ambulatory referral to Endocrinology; Future    Benign essential hypertension    Type 2 diabetes mellitus with hyperglycemia, unspecified whether long term insulin use (HCC)  -     insulin glargine (LANTUS SOLOSTAR) 100 units/mL injection pen; Inject 50 Units under the skin every 12 (twelve) hours for 180 days  -     insulin lispro (HUMALOG KWIKPEN) 100 units/mL injection pen; Inject 32 Units under the skin 3 (three) times a day with meals for 180 days  -     atorvastatin (LIPITOR) 10 mg tablet; Take 1 tablet (10 mg total) by mouth daily for 180 days          Subjective:      Patient ID: Freda Turk is a 49 y o  male      Patient here for one-month follow-up to review his sugar logs after he was started on basal bolus  Currently patient is taking 80 units of Lantus at bedtime and Humalog 28 units before each meal   Patient is log is not totally complete but it does show initially all of his readings were quite elevated in the 150-300 range before each meal   In the past week patient has had lower readings at bedtime with 2 readings below 90  Patient states that he did feel a bit more tired with the lower readings but he did not have any nausea vomiting tremors or confusion  Patient states he is not skipping any meals but we reviewed how his number show that he is not eating basically the same amount of calories at each meal because of the wide variation in his readings  With discussion patient is agreeable to changing to 5 injections a day  This will be increasing his Lantus 200 units but splitting it to 50 units twice daily and we will increase his Humalog to 32 units before each meal breakfast lunch and dinner because he did have a few readings at bedtime that were below 90  Patient has additional logs and will continue to monitor this and to bring this paper and drop it off in the office in 1 month  The following portions of the patient's history were reviewed and updated as appropriate: allergies, current medications, past family history, past medical history, past social history, past surgical history and problem list     Review of Systems   Constitutional: Negative for chills, fever and unexpected weight change  Respiratory: Negative  Negative for cough and shortness of breath  Cardiovascular: Negative  Negative for chest pain and palpitations  Musculoskeletal: Negative  Skin: Negative for rash  Psychiatric/Behavioral: Negative            Objective:      /80 (BP Location: Right arm, Patient Position: Sitting, Cuff Size: Standard)   Pulse 100   Temp 98 °F (36 7 °C) (Oral)   Ht 5' 6 5" (1 689 m)   Wt (!) 161 kg (355 lb 13 2 oz)   BMI 56 57 kg/m²          Physical Exam   Constitutional: He is oriented to person, place, and time  He appears well-developed and well-nourished  HENT:   Head: Normocephalic and atraumatic  Cardiovascular: Normal rate, regular rhythm and normal heart sounds  No murmur heard  Pulmonary/Chest: Effort normal and breath sounds normal  No respiratory distress  Musculoskeletal: Normal range of motion  He exhibits no edema  Neurological: He is alert and oriented to person, place, and time  Skin: Skin is warm and dry  No rash noted  Psychiatric: He has a normal mood and affect   His behavior is normal

## 2019-08-01 ENCOUNTER — SOCIAL WORK (OUTPATIENT)
Dept: BEHAVIORAL/MENTAL HEALTH CLINIC | Facility: CLINIC | Age: 54
End: 2019-08-01
Payer: MEDICARE

## 2019-08-01 DIAGNOSIS — F41.1 GENERALIZED ANXIETY DISORDER: ICD-10-CM

## 2019-08-01 DIAGNOSIS — F31.60 BIPOLAR AFFECTIVE DISORDER, CURRENT EPISODE MIXED, CURRENT EPISODE SEVERITY UNSPECIFIED (HCC): Primary | ICD-10-CM

## 2019-08-01 PROCEDURE — 90834 PSYTX W PT 45 MINUTES: CPT | Performed by: SOCIAL WORKER

## 2019-08-01 NOTE — PSYCH
Treatment Plan Tracking    # jignesh's updatedTreatment Plan not completed within required time limits due to it was only due on the 26th  He wanted to talk about his progress on his current goals and he wasn't here for awhile  He also wanted to think about new goals so we will update it next session   :

## 2019-08-01 NOTE — PSYCH
Psychotherapy Provided: Individual Psychotherapy 45 minutes     Length of time in session: 45 minutes, follow up in 2 week    Goals addressed in session: Goal 1, Goal 2 and Goal 3      Pain:      moderate to severe    0    Current suicide risk : Low     Data: Wei Oliver arrived for his session  He continues to do well  Regarding the panic, the depression and the anxiety he feels the combination of therapy and the medication are helping  His relationship is going well and they are planning a trip to [place] in fall  His relationship with his girlfriends father is going well  Regarding his goals he is better at watching his diet and is doing better with his diabetes and he continues to work on perfecting his skills in dealing with difficult people  Assessment: We continue to work on mindfulness skills to help him maintain his symptoms at the lowest level possible  Plan: Continue to help him skill build in distress tolerance  Behavioral Health Treatment Plan [facility]: Diagnosis and Treatment Plan explained to Juana Evangelista relates understanding diagnosis and is agreeable to Treatment Plan   Yes

## 2019-08-02 ENCOUNTER — APPOINTMENT (OUTPATIENT)
Dept: LAB | Facility: CLINIC | Age: 54
End: 2019-08-02
Payer: MEDICARE

## 2019-08-02 DIAGNOSIS — N18.2 CKD (CHRONIC KIDNEY DISEASE) STAGE 2, GFR 60-89 ML/MIN: ICD-10-CM

## 2019-08-02 LAB
ALBUMIN SERPL BCP-MCNC: 2.8 G/DL (ref 3.5–5)
ALP SERPL-CCNC: 94 U/L (ref 46–116)
ALT SERPL W P-5'-P-CCNC: 14 U/L (ref 12–78)
ANION GAP SERPL CALCULATED.3IONS-SCNC: 11 MMOL/L (ref 4–13)
AST SERPL W P-5'-P-CCNC: 17 U/L (ref 5–45)
BACTERIA UR QL AUTO: ABNORMAL /HPF
BILIRUB SERPL-MCNC: 0.1 MG/DL (ref 0.2–1)
BILIRUB UR QL STRIP: NEGATIVE
BUN SERPL-MCNC: 14 MG/DL (ref 5–25)
CALCIUM SERPL-MCNC: 8.7 MG/DL (ref 8.3–10.1)
CHLORIDE SERPL-SCNC: 105 MMOL/L (ref 100–108)
CLARITY UR: CLEAR
CO2 SERPL-SCNC: 24 MMOL/L (ref 21–32)
COLOR UR: ABNORMAL
CREAT SERPL-MCNC: 1.66 MG/DL (ref 0.6–1.3)
CREAT UR-MCNC: 121 MG/DL
ERYTHROCYTE [DISTWIDTH] IN BLOOD BY AUTOMATED COUNT: 14.1 % (ref 11.6–15.1)
GFR SERPL CREATININE-BSD FRML MDRD: 54 ML/MIN/1.73SQ M
GLUCOSE SERPL-MCNC: 147 MG/DL (ref 65–140)
GLUCOSE UR STRIP-MCNC: NEGATIVE MG/DL
HCT VFR BLD AUTO: 34.5 % (ref 36.5–49.3)
HGB BLD-MCNC: 11.1 G/DL (ref 12–17)
HGB UR QL STRIP.AUTO: ABNORMAL
KETONES UR STRIP-MCNC: NEGATIVE MG/DL
LEUKOCYTE ESTERASE UR QL STRIP: NEGATIVE
MCH RBC QN AUTO: 25.2 PG (ref 26.8–34.3)
MCHC RBC AUTO-ENTMCNC: 32.2 G/DL (ref 31.4–37.4)
MCV RBC AUTO: 78 FL (ref 82–98)
NITRITE UR QL STRIP: NEGATIVE
NON-SQ EPI CELLS URNS QL MICRO: ABNORMAL /HPF
PH UR STRIP.AUTO: 6 [PH]
PLATELET # BLD AUTO: 211 THOUSANDS/UL (ref 149–390)
PMV BLD AUTO: 9.9 FL (ref 8.9–12.7)
POTASSIUM SERPL-SCNC: 4.3 MMOL/L (ref 3.5–5.3)
PROT SERPL-MCNC: 7.3 G/DL (ref 6.4–8.2)
PROT UR STRIP-MCNC: ABNORMAL MG/DL
PROT UR-MCNC: 77 MG/DL
PROT/CREAT UR: 0.64 MG/G{CREAT} (ref 0–0.1)
RBC # BLD AUTO: 4.41 MILLION/UL (ref 3.88–5.62)
RBC #/AREA URNS AUTO: ABNORMAL /HPF
SODIUM SERPL-SCNC: 140 MMOL/L (ref 136–145)
SP GR UR STRIP.AUTO: 1.02 (ref 1–1.03)
UROBILINOGEN UR QL STRIP.AUTO: 0.2 E.U./DL
WBC # BLD AUTO: 6.4 THOUSAND/UL (ref 4.31–10.16)
WBC #/AREA URNS AUTO: ABNORMAL /HPF

## 2019-08-02 PROCEDURE — 80053 COMPREHEN METABOLIC PANEL: CPT

## 2019-08-02 PROCEDURE — 81001 URINALYSIS AUTO W/SCOPE: CPT

## 2019-08-02 PROCEDURE — 82570 ASSAY OF URINE CREATININE: CPT

## 2019-08-02 PROCEDURE — 84156 ASSAY OF PROTEIN URINE: CPT

## 2019-08-02 PROCEDURE — 85027 COMPLETE CBC AUTOMATED: CPT

## 2019-08-02 PROCEDURE — 36415 COLL VENOUS BLD VENIPUNCTURE: CPT

## 2019-08-05 DIAGNOSIS — E11.42 DIABETIC POLYNEUROPATHY ASSOCIATED WITH TYPE 2 DIABETES MELLITUS (HCC): ICD-10-CM

## 2019-08-05 RX ORDER — GABAPENTIN 300 MG/1
300 CAPSULE ORAL 2 TIMES DAILY
Qty: 180 CAPSULE | Refills: 1 | Status: SHIPPED | OUTPATIENT
Start: 2019-08-05 | End: 2020-01-27

## 2019-08-09 DIAGNOSIS — K22.2 LOWER ESOPHAGEAL RING (SCHATZKI): ICD-10-CM

## 2019-08-09 DIAGNOSIS — K21.00 GASTROESOPHAGEAL REFLUX DISEASE WITH ESOPHAGITIS: ICD-10-CM

## 2019-08-09 DIAGNOSIS — K44.9 HIATAL HERNIA: ICD-10-CM

## 2019-08-09 RX ORDER — OMEPRAZOLE 20 MG/1
CAPSULE, DELAYED RELEASE ORAL
Qty: 90 CAPSULE | Refills: 1 | Status: SHIPPED | OUTPATIENT
Start: 2019-08-09 | End: 2020-01-31

## 2019-08-28 ENCOUNTER — OFFICE VISIT (OUTPATIENT)
Dept: NEPHROLOGY | Facility: CLINIC | Age: 54
End: 2019-08-28
Payer: MEDICARE

## 2019-08-28 VITALS
DIASTOLIC BLOOD PRESSURE: 78 MMHG | BODY MASS INDEX: 50.62 KG/M2 | SYSTOLIC BLOOD PRESSURE: 136 MMHG | HEIGHT: 66 IN | WEIGHT: 315 LBS | HEART RATE: 80 BPM

## 2019-08-28 DIAGNOSIS — N18.30 BENIGN HYPERTENSION WITH CKD (CHRONIC KIDNEY DISEASE) STAGE III (HCC): ICD-10-CM

## 2019-08-28 DIAGNOSIS — D63.1 ANEMIA DUE TO STAGE 3 CHRONIC KIDNEY DISEASE (HCC): ICD-10-CM

## 2019-08-28 DIAGNOSIS — N18.30 ANEMIA DUE TO STAGE 3 CHRONIC KIDNEY DISEASE (HCC): ICD-10-CM

## 2019-08-28 DIAGNOSIS — N18.30 STAGE 3 CHRONIC KIDNEY DISEASE (HCC): Primary | ICD-10-CM

## 2019-08-28 DIAGNOSIS — I12.9 BENIGN HYPERTENSION WITH CKD (CHRONIC KIDNEY DISEASE) STAGE III (HCC): ICD-10-CM

## 2019-08-28 PROBLEM — R80.1 PERSISTENT PROTEINURIA: Status: ACTIVE | Noted: 2019-08-28

## 2019-08-28 PROCEDURE — 99214 OFFICE O/P EST MOD 30 MIN: CPT | Performed by: PHYSICIAN ASSISTANT

## 2019-08-28 NOTE — PROGRESS NOTES
Assessment and Plan:    Zuleyma Taylor was seen today for follow-up and chronic kidney disease  Diagnoses and all orders for this visit:    Stage 3 chronic kidney disease ([address] )  -     Basic metabolic panel; Future  -     Magnesium; Future  -     Phosphorus; Future  -     Protein / creatinine ratio, urine; Future    Benign hypertension with CKD (chronic kidney disease) stage III (HCC)    Anemia due to stage 3 chronic kidney disease (HCC)  -     CBC; Future  -     Iron Saturation %; Future  -     Ferritin; Future      Chronic Kidney Disease stage III- Baseline creatinine was 1 3-1 5 but appears to have slightly progressed to 1 6  Renal ultrasound was benign  UA showed small blood (0-1 rbc) & 2+ protein  Presumed etiology of CKD is due to diabetic nephropathy and hypertensive nephrosclerosis  Kidney Smart: declined    Proteinuria- UPC ratio is 0 64 which is at goal of <1g  Continue ACEI  Hypertension- Avoid salt in your diet  Exercise as able  Avoid nonsteroidal medications including advil, aleve, ibuprofen, motrin, naproxen, mobic, indomethacin, toradol, and celebrex  Goal blood pressure is <130/80  Antihypertensive regimen includes amlodipine 5mg daily and lisinopril/hctz 20/25mg daily  Please take your blood pressure at home  Please obtain a blood pressure cuff with a large cuff  Bring list of BPs into the office next visit  Anemia of chronic disease- hgb slightly decreased from 6 months ago  Continue to monitor  Will check iron studies  Type 2 Diabetes mellitus with neuropathy- managed with insulin    Follow up with Dr Dianne Thorpe in 3 months  Please call the office with any questions or concerns  Colonoscopy: 9/12/18    Reason for Visit: Follow-up and Chronic Kidney Disease    HPI: Viet Carrillo is a 49 y o  male who is here for follow up of chronic kidney disease and hypertension  He first saw Dr Dianne Thorpe in consult in April  He denies all complaints and is feeling well    He does not follow a low sodium diet  He does not check his blood pressure at home  He does not want to attend a CKD class at this time  ROS: A complete review of systems was performed and was negative unless otherwise noted in the history of present illness      Allergies:   Pollen extract and Tetanus toxoid    Medications:     Current Outpatient Medications:     amLODIPine (NORVASC) 5 mg tablet, TAKE 1 TABLET(5 MG) BY MOUTH DAILY, Disp: 90 tablet, Rfl: 0    atorvastatin (LIPITOR) 10 mg tablet, Take 1 tablet (10 mg total) by mouth daily for 180 days, Disp: 90 tablet, Rfl: 1    buPROPion (WELLBUTRIN) 100 mg tablet, Take 1 tablet (100 mg total) by mouth daily for 90 days, Disp: 90 tablet, Rfl: 0    busPIRone (BUSPAR) 10 mg tablet, Take 1 tablet (10 mg total) by mouth 2 (two) times a day for 90 days, Disp: 180 tablet, Rfl: 0    clonazePAM (KlonoPIN) 1 mg tablet, Take 1 tablet (1 mg total) by mouth daily as needed for anxiety (or panic attacks) for up to 30 days, Disp: 30 tablet, Rfl: 1    gabapentin (NEURONTIN) 300 mg capsule, Take 1 capsule (300 mg total) by mouth 2 (two) times a day for 180 days, Disp: 180 capsule, Rfl: 1    insulin glargine (LANTUS SOLOSTAR) 100 units/mL injection pen, Inject 50 Units under the skin every 12 (twelve) hours for 180 days, Disp: 10 pen, Rfl: 5    insulin lispro (HUMALOG KWIKPEN) 100 units/mL injection pen, Inject 32 Units under the skin 3 (three) times a day with meals for 180 days, Disp: 10 pen, Rfl: 5    lisinopril-hydrochlorothiazide (PRINZIDE,ZESTORETIC) 20-25 MG per tablet, TAKE 1 TABLET BY MOUTH DAILY, Disp: 90 tablet, Rfl: 0    lurasidone (LATUDA) 120 mg tablet, Take 1 tablet (120 mg total) by mouth daily with breakfast for 90 days, Disp: 90 tablet, Rfl: 0    omeprazole (PriLOSEC) 20 mg delayed release capsule, TAKE 1 CAPSULE(20 MG) BY MOUTH DAILY  DAYS, Disp: 90 capsule, Rfl: 1    traZODone (DESYREL) 50 mg tablet, Take 1 tablet (50 mg total) by mouth daily at bedtime as needed for sleep for up to 90 days, Disp: 90 tablet, Rfl: 0    Blood Glucose Monitoring Suppl (FREESTYLE LITE) ANTONIA, by Does not apply route 3 (three) times a day, Disp: 1 each, Rfl: 0    glucose blood (FREESTYLE LITE) test strip, 1 each by Other route 3 (three) times a day, Disp: 300 each, Rfl: 5    Insulin Pen Needle (B-D ULTRAFINE III SHORT PEN) 31G X 8 MM MISC, by Does not apply route, Disp: , Rfl:     INSULIN SYRINGE  5CC/29G 29G X 1/2" 0 5 ML MISC, by Does not apply route, Disp: , Rfl:     Lancets (FREESTYLE) lancets, USE THREE TIMES DAILY AS DIRECTED, Disp: 300 each, Rfl: 0    Past Medical History:   Diagnosis Date    ADHD, adult residual type     Cataract     Left eye    Depression     Diabetes mellitus (HCC)     blood sugar 167 on admission    Equinus deformity of foot     GERD (gastroesophageal reflux disease)     Homicidal ideations     Hyperlipidemia     Hypertension     Microalbuminuria     Morbid obesity (HCC)     Neuropathy     Sleep apnea     CPAP at bedtime    Suicidal intent      Past Surgical History:   Procedure Laterality Date    CATARACT EXTRACTION      HAND SURGERY Right     OTHER SURGICAL HISTORY      REPAIR OF SUPERFICIAL WOUND FACE    MO ESOPHAGOGASTRODUODENOSCOPY TRANSORAL DIAGNOSTIC N/A 6/14/2018    Procedure: ESOPHAGOGASTRODUODENOSCOPY (EGD); Surgeon: Julieta Fischer MD;  Location: [facility]; Service: Gastroenterology    MO ESOPHAGOGASTRODUODENOSCOPY TRANSORAL DIAGNOSTIC N/A 9/12/2018    Procedure: EGD AND COLONOSCOPY;  Surgeon: Julieta Fischer MD;  [facility]: [facility]; Service: Gastroenterology     Family History   Problem Relation Age of Onset    Diabetes Mother     Hypertension Mother     Alcohol abuse Father     Diabetes Father     Hypertension Father     Alcohol abuse Sister     Depression Sister     Alcohol abuse Family       reports that he has quit smoking  His smoking use included cigarettes  His smokeless tobacco use includes chew   He reports that he drinks alcohol  He reports that he does not use drugs  Physical Exam:   Vitals:    08/28/19 1152 08/28/19 1206   BP:  136/78   BP Location:  Right arm   Patient Position:  Sitting   Cuff Size:  Large   Pulse:  80   Weight: (!) 159 kg (350 lb)    Height: 5' 6" (1 676 m)      Body mass index is 56 49 kg/m²  General: NAD  Neuro: AAO  Neck: supple  Skin: no rash  Heart: RRR  Lungs: CTAB  Abdomen: soft nt nd  Extremities: no edema    Procedure:  No results found for this or any previous visit  Labs reviewed      Lab Results   Component Value Date    GLUCOSE 315 (H) 12/15/2015    CALCIUM 8 7 08/02/2019     12/15/2015    K 4 3 08/02/2019    CO2 24 08/02/2019     08/02/2019    BUN 14 08/02/2019    CREATININE 1 66 (H) 08/02/2019

## 2019-08-28 NOTE — PATIENT INSTRUCTIONS
Chronic Kidney Disease stage III- Baseline creatinine was 1 3-1 5 but appears to have slightly progressed to 1 6  Renal ultrasound was benign  UA showed small blood (0-1 rbc) & 2+ protein  Presumed etiology of CKD is due to diabetic nephropathy and hypertensive nephrosclerosis  Kidney Smart: declined    Proteinuria- UPC ratio is 0 64 which is at goal of <1g  Continue ACEI  Hypertension- Avoid salt in your diet  Exercise as able  Avoid nonsteroidal medications including advil, aleve, ibuprofen, motrin, naproxen, mobic, indomethacin, toradol, and celebrex  Goal blood pressure is <130/80  Antihypertensive regimen includes amlodipine 5mg daily and lisinopril/hctz 20/25mg daily  Please take your blood pressure at home  Please obtain a blood pressure cuff with a large cuff  Bring list of BPs into the office next visit  Anemia of chronic disease- hgb slightly decreased from 6 months ago  Continue to monitor  Will check iron studies  Type 2 Diabetes mellitus with neuropathy- managed with insulin    Follow up with Dr John Fuchs in 3 months  Please call the office with any questions or concerns

## 2019-08-29 ENCOUNTER — TELEPHONE (OUTPATIENT)
Dept: INTERNAL MEDICINE CLINIC | Facility: CLINIC | Age: 54
End: 2019-08-29

## 2019-08-29 NOTE — TELEPHONE ENCOUNTER
Did he just drop this off? im asking because the insulin regimen at the bottom of the log is what he was on before prior to seeing Vin Junior in july, according to [address] note from July he should be using lantus 50units in AM and 50units PM and 32 units of humalog before each meal (he wrote 80 units lantus at bedtime and 28 units humalog at meals)  If it is recent sugars from past month, Can we also please confirm how he is using insulin?

## 2019-08-29 NOTE — TELEPHONE ENCOUNTER
I called and confirmed with patient  He states the logs are from his last appt with Eugenia Hernandez  Patient states he has recorded them daily  He confirms taking Lantus 50 units BID and Humalog 32 units with each meal ONLY if his BG is greater than 100

## 2019-08-29 NOTE — TELEPHONE ENCOUNTER
Just to make sure I understand: log is from last visit with old insulin doses, or log is since visit with denise and with new insulin doses?

## 2019-08-30 NOTE — TELEPHONE ENCOUNTER
The log has blood sugars since his last appt, but the insulin doses on the bottom are OLD insulin doses

## 2019-08-30 NOTE — TELEPHONE ENCOUNTER
Cheli, I am unsure what to do with this patient's insulin  He already is on 50units BID lantus and higher dose of rapid acting and he still has sugars 200's and some in 300's, but doesn't seem as much  He is getting some sugars in high 100's  Pt was not yet contacted and I didn't get a chance to talk to dr Sevilla People about this  His sugars are scanned under media  I was thinking of having him increase his meal time insulin?

## 2019-09-04 ENCOUNTER — SOCIAL WORK (OUTPATIENT)
Dept: BEHAVIORAL/MENTAL HEALTH CLINIC | Facility: CLINIC | Age: 54
End: 2019-09-04
Payer: MEDICARE

## 2019-09-04 DIAGNOSIS — F41.1 GENERALIZED ANXIETY DISORDER: ICD-10-CM

## 2019-09-04 DIAGNOSIS — F31.60 BIPOLAR AFFECTIVE DISORDER, CURRENT EPISODE MIXED, CURRENT EPISODE SEVERITY UNSPECIFIED (HCC): Primary | ICD-10-CM

## 2019-09-04 DIAGNOSIS — G47.00 INSOMNIA, UNSPECIFIED TYPE: ICD-10-CM

## 2019-09-04 PROCEDURE — 90834 PSYTX W PT 45 MINUTES: CPT | Performed by: SOCIAL WORKER

## 2019-09-04 NOTE — PSYCH
Psychotherapy Provided: Individual Psychotherapy 45 minutes     Length of time in session: 45 minutes, follow up in 2 week    Goals addressed in session: Goal 1, Goal 2 and Goal 3      Pain:      moderate to severe    0    Current suicide risk : Low     Data: Carloz Lane arrived for his session  In his house were Henok Sanz, his partner [employer], his niece Nabil Le, and Marie's father Dev Joel  Now Marie's daughter Chloe Samuels, her partner Capri Chen and their 2 children Elena Lugo their son, and their daughter Carlos Schmid  There are now 8 people in the house  Carloz Lane admits he is not happy they moved in but shared they are all getting along  Carloz Lane shared everyone is happy about this arrangement except him  This is affecting his mood in that he claims he is more agitated  He is more agitated due to the 2 small children in the house and the parents do their own thing  He is upset the grandkids sleep with them because the parents are up watching [employer]  They also offer nothing towards rent, food, utilities, food, or personal products  Carloz Lane knows they will be there longer than they say  Assessment: We updated his recovery plan and he still wants to work on a total wellness plan, he wants to learn to cope better with difficult people and he wants to continue to work on strategies to keep his depression low  Plan: Continue to help him progress on his goals  Behavioral Health Treatment Plan [facility]: Diagnosis and Treatment Plan explained to Berle Jay relates understanding diagnosis and is agreeable to Treatment Plan   Yes

## 2019-09-04 NOTE — BH TREATMENT PLAN
Sly Jaffe  1965       Date of Initial Treatment Plan: 05/07/2018   Date of Current Treatment Plan: 09/04/19    Treatment Plan Number 3rd update    Strengths/Personal Resources for Self Care: kind, helpful, passion for life    Diagnosis: Bipolar Mixed, Panic disorder    Area of Needs: I still need to focus on improving my overall health, I still need to work on skills to cope and deal with difficult people, I need to keep my depression to a minimum  Long Term Goal 1: I still need to focus on improving my overall health  Target Date:01/04/2020  Completion Date:TBD          Short Term Objectives for Goal 1: I am eating better and I am thinking about joining a gym    Long Term Goal 2: I still need to work on developing coping skills to deal with difficult people  Target Date: 01/04/2020  Completion Date: TBD    Short Term Objectives for Goal 2: I will develop and refine coping strategies discussed and reviewed in therapy  Long Term Goal # 3: I need to maintain my depression to a minimum  Target Date: 01/04/2020  Completion Date: TBD    Short Term Objectives for Goal 3: Mindfulness    GOAL 1: Modality: Individual 2x per month   Completion Date tbd    GOAL 2: Modality: Individual 2x per month   Completion Date tbd     GOAL 3: Modality: Individual n/ax per month   Completion Date n/a      Behavioral Health Treatment Plan [facility]: Diagnosis and Treatment Plan explained to Juanjose Diane relates understanding diagnosis and is agreeable to Treatment Plan         Client Comments : Please share your thoughts, feelings, need and/or experiences regarding your treatment plan: Anastacio Ramon participated in the development of the plan
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
## 2019-09-13 ENCOUNTER — OFFICE VISIT (OUTPATIENT)
Dept: PSYCHIATRY | Facility: CLINIC | Age: 54
End: 2019-09-13
Payer: MEDICARE

## 2019-09-13 VITALS
SYSTOLIC BLOOD PRESSURE: 137 MMHG | HEART RATE: 80 BPM | DIASTOLIC BLOOD PRESSURE: 85 MMHG | HEIGHT: 66 IN | BODY MASS INDEX: 56.49 KG/M2

## 2019-09-13 DIAGNOSIS — E66.01 MORBID OBESITY WITH BMI OF 50.0-59.9, ADULT (HCC): ICD-10-CM

## 2019-09-13 DIAGNOSIS — F41.0 PANIC ATTACKS: ICD-10-CM

## 2019-09-13 DIAGNOSIS — G47.00 INSOMNIA, UNSPECIFIED TYPE: ICD-10-CM

## 2019-09-13 DIAGNOSIS — F31.61 BIPOLAR DISORDER, CURRENT EPISODE MIXED, MILD (HCC): ICD-10-CM

## 2019-09-13 DIAGNOSIS — F41.1 GENERALIZED ANXIETY DISORDER: Primary | ICD-10-CM

## 2019-09-13 PROCEDURE — 99214 OFFICE O/P EST MOD 30 MIN: CPT | Performed by: PHYSICIAN ASSISTANT

## 2019-09-13 RX ORDER — BUPROPION HYDROCHLORIDE 100 MG/1
100 TABLET ORAL DAILY
Qty: 90 TABLET | Refills: 0 | Status: SHIPPED | OUTPATIENT
Start: 2019-09-13 | End: 2019-11-07 | Stop reason: SDUPTHER

## 2019-09-13 RX ORDER — TRAZODONE HYDROCHLORIDE 50 MG/1
50 TABLET ORAL
Qty: 90 TABLET | Refills: 0 | Status: SHIPPED | OUTPATIENT
Start: 2019-09-13 | End: 2019-11-07 | Stop reason: SDUPTHER

## 2019-09-13 RX ORDER — CLONAZEPAM 1 MG/1
1 TABLET ORAL DAILY PRN
Qty: 30 TABLET | Refills: 1 | Status: SHIPPED | OUTPATIENT
Start: 2019-09-13 | End: 2019-11-07 | Stop reason: SDUPTHER

## 2019-09-13 RX ORDER — BUSPIRONE HYDROCHLORIDE 10 MG/1
10 TABLET ORAL 2 TIMES DAILY
Qty: 180 TABLET | Refills: 0 | Status: SHIPPED | OUTPATIENT
Start: 2019-09-13 | End: 2019-11-07 | Stop reason: SDUPTHER

## 2019-09-18 ENCOUNTER — SOCIAL WORK (OUTPATIENT)
Dept: BEHAVIORAL/MENTAL HEALTH CLINIC | Facility: CLINIC | Age: 54
End: 2019-09-18
Payer: MEDICARE

## 2019-09-18 DIAGNOSIS — G47.00 INSOMNIA, UNSPECIFIED TYPE: ICD-10-CM

## 2019-09-18 DIAGNOSIS — F31.61 BIPOLAR DISORDER, CURRENT EPISODE MIXED, MILD (HCC): Primary | ICD-10-CM

## 2019-09-18 DIAGNOSIS — F41.1 GENERALIZED ANXIETY DISORDER: ICD-10-CM

## 2019-09-18 PROCEDURE — 90834 PSYTX W PT 45 MINUTES: CPT | Performed by: SOCIAL WORKER

## 2019-09-18 NOTE — PSYCH
Psychotherapy Provided: Individual Psychotherapy 45 minutes     Length of time in session: 45 minutes, follow up in 2 week    Goals addressed in session: Goal 1, Goal 2 and Goal 3      Pain:      moderate to severe    0    Current suicide risk : Low     Data: Phovikki Sutton arrived for his session  His partner Cristopher Kessler now has her daughter Ronak Garrison and her boyfriend  Samra Gracia live there with their two children  Samra Garcia refuses to work and per Phoebe  never lasts long and if he doesn't get [employer] he quits a job  Amos Gibson works  This is all very frustrating to Phoebe Risk  They dont pay a dime to Phoebe  or his partner  So jignesh is paying for them  Her father recently was scammed out of a lot of money  Regarding his goals he admits he is not doing anything to improve his overall health  He kendall and deals with difficult people by simply not dealing with them   He is per goal 3 doing better with depression because he says he does not let people he shared gets under his skin  Assessment: Carolyn Sutton is doing better with his depression and he is coping better with  Difficult people  He admits he needs to do more for himself especially in regards to his health  Plan: Continue to encourage him to do more for himself  Behavioral Health Treatment Plan [facility]: Diagnosis and Treatment Plan explained to Karlie Billings relates understanding diagnosis and is agreeable to Treatment Plan   Yes

## 2019-09-25 ENCOUNTER — OFFICE VISIT (OUTPATIENT)
Dept: SLEEP CENTER | Facility: CLINIC | Age: 54
End: 2019-09-25
Payer: MEDICARE

## 2019-09-25 VITALS
SYSTOLIC BLOOD PRESSURE: 116 MMHG | WEIGHT: 315 LBS | BODY MASS INDEX: 49.44 KG/M2 | DIASTOLIC BLOOD PRESSURE: 70 MMHG | HEIGHT: 67 IN

## 2019-09-25 DIAGNOSIS — G47.33 OSA (OBSTRUCTIVE SLEEP APNEA): Primary | ICD-10-CM

## 2019-09-25 PROCEDURE — 99213 OFFICE O/P EST LOW 20 MIN: CPT | Performed by: INTERNAL MEDICINE

## 2019-09-25 NOTE — PROGRESS NOTES
Progress Note - [facility] :1965 MRN: [id number]      Reason for Visit:  49 y o male here for annual follow-up    Assessment:  Doing well on current therapy of APAP 11 to 7 cm for very severe DAISY (AHI = 60 0)  Plan:  Continue same    Follow up: One year    History of Present Illness:  History of DAISY on PAP therapy  Fully compliant and deriving benefit  Review of Systems      Genitourinary none   Cardiology none   Gastrointestinal none   Neurology none   Constitutional none   Integumentary none   Psychiatry anxiety and depression   Musculoskeletal none   Pulmonary none   ENT none   Endocrine none   Hematological none         I have reviewed and updated the review of systems as necessary      Historical Information    Past Medical History:   Past Medical History:   Diagnosis Date    ADHD, adult residual type     Cataract     Left eye    Depression     Diabetes mellitus (HCC)     blood sugar 167 on admission    Equinus deformity of foot     GERD (gastroesophageal reflux disease)     Homicidal ideations     Hyperlipidemia     Hypertension     Microalbuminuria     Morbid obesity (HCC)     Neuropathy     Sleep apnea     CPAP at bedtime    Suicidal intent          Past Surgical History:   Past Surgical History:   Procedure Laterality Date    CATARACT EXTRACTION      HAND SURGERY Right     OTHER SURGICAL HISTORY      REPAIR OF SUPERFICIAL WOUND FACE    HI ESOPHAGOGASTRODUODENOSCOPY TRANSORAL DIAGNOSTIC N/A 2018    Procedure: ESOPHAGOGASTRODUODENOSCOPY (EGD); Surgeon: Eduardo Ferguson MD;  Location: [facility]; Service: Gastroenterology    HI ESOPHAGOGASTRODUODENOSCOPY TRANSORAL DIAGNOSTIC N/A 2018    Procedure: EGD AND COLONOSCOPY;  Surgeon: Eduardo Ferguson MD;  Location: [facility];   Service: Gastroenterology       Social History:   Social History     Socioeconomic History    Marital status: /Civil Union     Spouse name: None    Number of children: 1    Years of education: None    Highest education level: None   Occupational History    Occupation: On disability   Social Needs    Financial resource strain: Not hard at all   [place] insecurity:     Worry: Never true     Inability: Never true   Lionical needs:     Medical: No     Non-medical: No   Tobacco Use    Smoking status: Former Smoker     Types: Cigarettes     Last attempt to quit:      Years since quittin 7    Smokeless tobacco: Former User     Types: Chew    Tobacco comment: pt "Quit after approx over 25 years ago"   Substance and Sexual Activity    Alcohol use: Yes     Comment: rarely    Drug use: No     Comment: quit 20 years ago    Sexual activity: Yes     Partners: Female     Birth control/protection: None     Comment: steady girlfriend   Lifestyle    Physical activity:     Days per week: 0 days     Minutes per session: 0 min    Stress: None   Relationships    Social connections:     Talks on phone: None     Gets together: None     Attends Baptist service: None     Active member of club or organization: None     Attends meetings of clubs or organizations: None     Relationship status: None    Intimate partner violence:     Fear of current or ex partner: Patient refused     Emotionally abused: Patient refused     Physically abused: Patient refused     Forced sexual activity: Patient refused   Other Topics Concern    None   Social History Narrative     from spouse, technically still  but living with other partner, partner's father, and early 2019, his partner's daughter and boyfriend moved in with their two children  Children: 1 stepdaughter         Education:    Pt denies any hx of learning disabilities and reached childhood milestones on time as far as he knows    He wore braces for equinovarus but states he did not suffer delay in walking    Graduated High School --he was held back 3 times because "I was just lazy, didn't do the work "    No college    Took some core classes in [employer] and worked as a volunteer  from approx 8075-1105             Substance Abuse History:     Pt drinks ETOH approx q 3-4 months but denies any h/o ETOH abuse  Pt tried smoking Crack once in the past and has been smoking THC once q 2-3 months since 12y/o  He denies other drug use, IVDA, addictions or drug abuse             Family History:   Family History   Problem Relation Age of Onset    Diabetes Mother     Hypertension Mother     Alcohol abuse Father     Diabetes Father     Hypertension Father     Alcohol abuse Sister     Depression Sister     Alcohol abuse Family        Medications/Allergies:      Current Outpatient Medications:     amLODIPine (NORVASC) 5 mg tablet, TAKE 1 TABLET(5 MG) BY MOUTH DAILY, Disp: 90 tablet, Rfl: 0    atorvastatin (LIPITOR) 10 mg tablet, Take 1 tablet (10 mg total) by mouth daily for 180 days, Disp: 90 tablet, Rfl: 1    Blood Glucose Monitoring Suppl (FREESTYLE LITE) ANTONIA, by Does not apply route 3 (three) times a day, Disp: 1 each, Rfl: 0    buPROPion (WELLBUTRIN) 100 mg tablet, Take 1 tablet (100 mg total) by mouth daily, Disp: 90 tablet, Rfl: 0    busPIRone (BUSPAR) 10 mg tablet, Take 1 tablet (10 mg total) by mouth 2 (two) times a day, Disp: 180 tablet, Rfl: 0    clonazePAM (KlonoPIN) 1 mg tablet, Take 1 tablet (1 mg total) by mouth daily as needed for anxiety (or panic attacks), Disp: 30 tablet, Rfl: 1    gabapentin (NEURONTIN) 300 mg capsule, Take 1 capsule (300 mg total) by mouth 2 (two) times a day for 180 days, Disp: 180 capsule, Rfl: 1    glucose blood (FREESTYLE LITE) test strip, 1 each by Other route 3 (three) times a day, Disp: 300 each, Rfl: 5    insulin glargine (LANTUS SOLOSTAR) 100 units/mL injection pen, Inject 50 Units under the skin every 12 (twelve) hours for 180 days, Disp: 10 pen, Rfl: 5    insulin lispro (HUMALOG KWIKPEN) 100 units/mL injection pen, Inject 32 Units under the skin 3 (three) times a day with meals for 180 days, Disp: 10 pen, Rfl: 5    Insulin Pen Needle (B-D ULTRAFINE III SHORT PEN) 31G X 8 MM MISC, by Does not apply route, Disp: , Rfl:     INSULIN SYRINGE  5CC/29G 29G X 1/2" 0 5 ML MISC, by Does not apply route, Disp: , Rfl:     Lancets (FREESTYLE) lancets, USE THREE TIMES DAILY AS DIRECTED, Disp: 300 each, Rfl: 0    lisinopril-hydrochlorothiazide (PRINZIDE,ZESTORETIC) 20-25 MG per tablet, TAKE 1 TABLET BY MOUTH DAILY, Disp: 90 tablet, Rfl: 0    lurasidone (LATUDA) 120 mg tablet, Take 1 tablet (120 mg total) by mouth daily with breakfast, Disp: 90 tablet, Rfl: 0    omeprazole (PriLOSEC) 20 mg delayed release capsule, TAKE 1 CAPSULE(20 MG) BY MOUTH DAILY  DAYS, Disp: 90 capsule, Rfl: 1    traZODone (DESYREL) 50 mg tablet, Take 1 tablet (50 mg total) by mouth daily at bedtime as needed for sleep, Disp: 90 tablet, Rfl: 0          Objective      Vital Signs:   Vitals:    09/25/19 0958   BP: 116/70     [place] Sleepiness Scale: Total score: 4        Physical Exam:    General: Alert, appropriate, cooperative, overweight    Head: NC/AT    Skin: Warm, dry    Neuro: No motor abnormalities, cranial nerves appear intact    Extremity: No clubbing, cyanosis      DME Provider: [employer]        Counseling / Coordination of Care   I have spent 15 minutes with the patient today in which greater than 50% of this time was spent in counseling/coordination of care regarding: equipment and compliance  Board Certified Sleep Specialist    Portions of the record may have been created with voice recognition software  Occasional wrong word or "sound a like" substitutions may have occurred due to the inherent limitations of voice recognition software  Read the chart carefully and recognize, using context, where substitutions have occurred

## 2019-10-02 ENCOUNTER — SOCIAL WORK (OUTPATIENT)
Dept: BEHAVIORAL/MENTAL HEALTH CLINIC | Facility: CLINIC | Age: 54
End: 2019-10-02
Payer: MEDICARE

## 2019-10-02 DIAGNOSIS — G47.00 INSOMNIA, UNSPECIFIED TYPE: ICD-10-CM

## 2019-10-02 DIAGNOSIS — F41.1 GENERALIZED ANXIETY DISORDER: ICD-10-CM

## 2019-10-02 DIAGNOSIS — F31.61 BIPOLAR DISORDER, CURRENT EPISODE MIXED, MILD (HCC): Primary | ICD-10-CM

## 2019-10-02 PROCEDURE — 90834 PSYTX W PT 45 MINUTES: CPT | Performed by: SOCIAL WORKER

## 2019-10-02 NOTE — PSYCH
Psychotherapy Provided: Individual Psychotherapy 45 minutes     Length of time in session: 45 minutes, follow up in 2 week    Goals addressed in session: Goal 1, Goal 2 and Goal 3      Pain:      moderate to severe    0    Current suicide risk : Low     Data: Donita Martinez arrived for his session  Regarding his goals he admits he has not made much progress on goal 1 that of improving his overall health via diet and exercise  He does feel he has made progress in dealing with difficult people such as his partner's father and now her daughter and the daughters boyfriend and their children who have moved in, pay nothing and help with nothing  He is using mindfulness and he removes himself from bad situations  Regarding goal 3 he feels he is better managing his depression and reports less depression  Assessment: Donita Martinez has improved his coping abilities and his mood  He however needs and wants to take better self care thru diet and exercise  Plan: Will continue to help him progress on his goals  Behavioral Health Treatment Plan [facility]: Diagnosis and Treatment Plan explained to Dana Patricia relates understanding diagnosis and is agreeable to Treatment Plan   Yes

## 2019-10-16 ENCOUNTER — TELEPHONE (OUTPATIENT)
Dept: SLEEP CENTER | Facility: CLINIC | Age: 54
End: 2019-10-16

## 2019-10-16 DIAGNOSIS — G47.33 OSA (OBSTRUCTIVE SLEEP APNEA): Primary | ICD-10-CM

## 2019-10-18 DIAGNOSIS — I10 BENIGN ESSENTIAL HYPERTENSION: ICD-10-CM

## 2019-10-21 RX ORDER — AMLODIPINE BESYLATE 5 MG/1
TABLET ORAL
Qty: 90 TABLET | Refills: 0 | Status: SHIPPED | OUTPATIENT
Start: 2019-10-21 | End: 2020-01-27

## 2019-10-21 RX ORDER — LISINOPRIL AND HYDROCHLOROTHIAZIDE 25; 20 MG/1; MG/1
TABLET ORAL
Qty: 90 TABLET | Refills: 0 | Status: SHIPPED | OUTPATIENT
Start: 2019-10-21 | End: 2020-01-27

## 2019-10-29 ENCOUNTER — OFFICE VISIT (OUTPATIENT)
Dept: INTERNAL MEDICINE CLINIC | Facility: CLINIC | Age: 54
End: 2019-10-29

## 2019-10-29 VITALS
TEMPERATURE: 98.4 F | DIASTOLIC BLOOD PRESSURE: 66 MMHG | BODY MASS INDEX: 50.62 KG/M2 | SYSTOLIC BLOOD PRESSURE: 128 MMHG | HEIGHT: 66 IN | HEART RATE: 92 BPM | WEIGHT: 315 LBS

## 2019-10-29 DIAGNOSIS — J06.9 URTI (ACUTE UPPER RESPIRATORY INFECTION): Primary | ICD-10-CM

## 2019-10-29 PROCEDURE — 99213 OFFICE O/P EST LOW 20 MIN: CPT | Performed by: HOSPITALIST

## 2019-10-30 ENCOUNTER — APPOINTMENT (OUTPATIENT)
Dept: LAB | Facility: CLINIC | Age: 54
End: 2019-10-30
Payer: MEDICARE

## 2019-10-30 DIAGNOSIS — Z79.4 TYPE 2 DIABETES MELLITUS WITH HYPERGLYCEMIA, WITH LONG-TERM CURRENT USE OF INSULIN (HCC): ICD-10-CM

## 2019-10-30 DIAGNOSIS — N18.30 ANEMIA DUE TO STAGE 3 CHRONIC KIDNEY DISEASE (HCC): ICD-10-CM

## 2019-10-30 DIAGNOSIS — D63.1 ANEMIA DUE TO STAGE 3 CHRONIC KIDNEY DISEASE (HCC): ICD-10-CM

## 2019-10-30 DIAGNOSIS — E11.65 TYPE 2 DIABETES MELLITUS WITH HYPERGLYCEMIA, WITH LONG-TERM CURRENT USE OF INSULIN (HCC): ICD-10-CM

## 2019-10-30 LAB
ALBUMIN SERPL BCP-MCNC: 2.8 G/DL (ref 3.5–5)
ALP SERPL-CCNC: 106 U/L (ref 46–116)
ALT SERPL W P-5'-P-CCNC: 12 U/L (ref 12–78)
ANION GAP SERPL CALCULATED.3IONS-SCNC: 6 MMOL/L (ref 4–13)
AST SERPL W P-5'-P-CCNC: 8 U/L (ref 5–45)
BILIRUB SERPL-MCNC: 0.3 MG/DL (ref 0.2–1)
BUN SERPL-MCNC: 18 MG/DL (ref 5–25)
CALCIUM SERPL-MCNC: 8.8 MG/DL (ref 8.3–10.1)
CHLORIDE SERPL-SCNC: 97 MMOL/L (ref 100–108)
CO2 SERPL-SCNC: 29 MMOL/L (ref 21–32)
CREAT SERPL-MCNC: 1.68 MG/DL (ref 0.6–1.3)
CREAT UR-MCNC: 54.2 MG/DL
EST. AVERAGE GLUCOSE BLD GHB EST-MCNC: 197 MG/DL
GFR SERPL CREATININE-BSD FRML MDRD: 53 ML/MIN/1.73SQ M
GLUCOSE P FAST SERPL-MCNC: 288 MG/DL (ref 65–99)
HBA1C MFR BLD: 8.5 % (ref 4.2–6.3)
MICROALBUMIN UR-MCNC: 308 MG/L (ref 0–20)
MICROALBUMIN/CREAT 24H UR: 568 MG/G CREATININE (ref 0–30)
POTASSIUM SERPL-SCNC: 4.2 MMOL/L (ref 3.5–5.3)
PROT SERPL-MCNC: 7.5 G/DL (ref 6.4–8.2)
SODIUM SERPL-SCNC: 132 MMOL/L (ref 136–145)

## 2019-10-30 PROCEDURE — 82043 UR ALBUMIN QUANTITATIVE: CPT

## 2019-10-30 PROCEDURE — 80053 COMPREHEN METABOLIC PANEL: CPT

## 2019-10-30 PROCEDURE — 83036 HEMOGLOBIN GLYCOSYLATED A1C: CPT

## 2019-10-30 PROCEDURE — 82570 ASSAY OF URINE CREATININE: CPT

## 2019-10-30 PROCEDURE — 36415 COLL VENOUS BLD VENIPUNCTURE: CPT

## 2019-10-30 NOTE — PROGRESS NOTES
[facility]  INTERNAL MEDICINE OFFICE VISIT     PATIENT INFORMATION     Werner Barrientos   49 y o  male   MRN: [id number]    ASSESSMENT/PLAN     Diagnoses and all orders for this visit:    URTI (acute upper respiratory infection)  -Patient reports two days of non-productive cough, itchy throat, rhinorrhea  He denies fever, chills, myalgias/body aches, chest pain, sob, nausea, vomiting, abdominal pain  He reports his father in law who lives in the household, had similar symptoms last week  The patient has not received his flu shot for this year as of yet  On exam no erythema or tonsillar exudates, no sinus tenderness/pressure, no cervical lymphadenopathy, lungs are CTA bilaterally   -Patient likely has viral URTI, will recommend symptomatic management with coricidin HBP, as patient has hx of hypertension  Instructed patient to avoid anything with phenylephrine/pseudoephedrine  Instructed to call back if not improving by next week  Also instructed to call if worsening cough becoming productive with fevers, chest pain  HEALTH MAINTENANCE     Immunization History   Administered Date(s) Administered    INFLUENZA 12/11/2014, 10/09/2015, 12/21/2016, 09/28/2017    Influenza Quadrivalent, 6-35 Months IM 12/21/2016    Influenza TIV (IM) 12/11/2014, 10/09/2015, 09/28/2017    Influenza, recombinant, quadrivalent,injectable, preservative free 12/04/2018    Pneumococcal Polysaccharide PPV23 12/04/2018     ·     CHIEF COMPLAINT     Chief Complaint   Patient presents with    Cough    Cold Like Symptoms      HISTORY OF PRESENT ILLNESS     50 y/o male PMHx of hypertension, type 2 DM, bipolar disorder type 2, CKD stage 2, presents for evaluation of upper respiratory symptoms  He reports that his father in law, who lives in his household, developed non-productive cough, rhinorrhea, and general malaise last week   The patient developed similar symptoms 2 days ago, with rhinorrhea, itchy throat, non-productive cough  He denies fever, chills, nightsweats, myalgia's, chest pain, sob, nausea, vomiting  His father in law is now feeling better with only symptomatic management  No one else in his household, including his wife and kids are sick  He did not receive a flu shot yet this year  REVIEW OF SYSTEMS     Review of Systems   Constitutional: Negative for chills, diaphoresis and fatigue  HENT: Positive for congestion, postnasal drip, rhinorrhea and sore throat  Negative for ear pain, sinus pressure, sinus pain, sneezing and trouble swallowing  Eyes: Negative for visual disturbance  Respiratory: Positive for cough  Negative for chest tightness, shortness of breath and wheezing  Cardiovascular: Negative for chest pain, palpitations and leg swelling  Gastrointestinal: Negative for abdominal distention, abdominal pain, constipation, diarrhea, nausea and vomiting  Endocrine: Negative for polyphagia and polyuria  Genitourinary: Negative for dysuria, flank pain, frequency, hematuria and urgency  Musculoskeletal: Negative for arthralgias, back pain, gait problem, myalgias, neck pain and neck stiffness  Skin: Negative for color change, pallor, rash and wound  Neurological: Negative for dizziness, syncope, weakness, light-headedness, numbness and headaches  OBJECTIVE     Vitals:    10/29/19 1629   BP: 128/66   BP Location: Left arm   Patient Position: Sitting   Cuff Size: Adult   Pulse: 92   Temp: 98 4 °F (36 9 °C)   TempSrc: Oral   Weight: (!) 158 kg (347 lb 14 2 oz)   Height: 5' 6" (1 676 m)     Physical Exam   Constitutional: He is oriented to person, place, and time  He appears well-developed and well-nourished  No distress  HENT:   Head: Normocephalic and atraumatic  Mouth/Throat: Oropharynx is clear and moist  No oropharyngeal exudate  Eyes: Conjunctivae are normal  Right eye exhibits no discharge  Left eye exhibits no discharge  No scleral icterus     Neck: No JVD present  No tracheal deviation present  No thyromegaly present  Cardiovascular: Normal rate, regular rhythm, normal heart sounds and intact distal pulses  Exam reveals no gallop and no friction rub  No murmur heard  Pulmonary/Chest: Effort normal and breath sounds normal  No respiratory distress  He has no wheezes  He has no rales  He exhibits no tenderness  Abdominal: Soft  Bowel sounds are normal  He exhibits no distension and no mass  There is no tenderness  There is no rebound and no guarding  No hernia  Musculoskeletal: Normal range of motion  He exhibits no edema or deformity  Lymphadenopathy:     He has no cervical adenopathy  Neurological: He is alert and oriented to person, place, and time  Skin: Skin is warm and dry  He is not diaphoretic  No erythema  No pallor  Nursing note and vitals reviewed      CURRENT MEDICATIONS     Current Outpatient Medications:     amLODIPine (NORVASC) 5 mg tablet, TAKE 1 TABLET(5 MG) BY MOUTH DAILY, Disp: 90 tablet, Rfl: 0    atorvastatin (LIPITOR) 10 mg tablet, Take 1 tablet (10 mg total) by mouth daily for 180 days, Disp: 90 tablet, Rfl: 1    Blood Glucose Monitoring Suppl (FREESTYLE LITE) ANTONIA, by Does not apply route 3 (three) times a day, Disp: 1 each, Rfl: 0    buPROPion (WELLBUTRIN) 100 mg tablet, Take 1 tablet (100 mg total) by mouth daily, Disp: 90 tablet, Rfl: 0    busPIRone (BUSPAR) 10 mg tablet, Take 1 tablet (10 mg total) by mouth 2 (two) times a day, Disp: 180 tablet, Rfl: 0    clonazePAM (KlonoPIN) 1 mg tablet, Take 1 tablet (1 mg total) by mouth daily as needed for anxiety (or panic attacks), Disp: 30 tablet, Rfl: 1    gabapentin (NEURONTIN) 300 mg capsule, Take 1 capsule (300 mg total) by mouth 2 (two) times a day for 180 days, Disp: 180 capsule, Rfl: 1    glucose blood (FREESTYLE LITE) test strip, 1 each by Other route 3 (three) times a day, Disp: 300 each, Rfl: 5    insulin glargine (LANTUS SOLOSTAR) 100 units/mL injection pen, Inject 50 Units under the skin every 12 (twelve) hours for 180 days, Disp: 10 pen, Rfl: 5    insulin lispro (HUMALOG KWIKPEN) 100 units/mL injection pen, Inject 32 Units under the skin 3 (three) times a day with meals for 180 days, Disp: 10 pen, Rfl: 5    Insulin Pen Needle (B-D ULTRAFINE III SHORT PEN) 31G X 8 MM MISC, by Does not apply route, Disp: , Rfl:     INSULIN SYRINGE  5CC/29G 29G X 1/2" 0 5 ML MISC, by Does not apply route, Disp: , Rfl:     Lancets (FREESTYLE) lancets, USE THREE TIMES DAILY AS DIRECTED, Disp: 300 each, Rfl: 0    lisinopril-hydrochlorothiazide (PRINZIDE,ZESTORETIC) 20-25 MG per tablet, TAKE 1 TABLET BY MOUTH DAILY, Disp: 90 tablet, Rfl: 0    lurasidone (LATUDA) 120 mg tablet, Take 1 tablet (120 mg total) by mouth daily with breakfast, Disp: 90 tablet, Rfl: 0    omeprazole (PriLOSEC) 20 mg delayed release capsule, TAKE 1 CAPSULE(20 MG) BY MOUTH DAILY  DAYS, Disp: 90 capsule, Rfl: 1    traZODone (DESYREL) 50 mg tablet, Take 1 tablet (50 mg total) by mouth daily at bedtime as needed for sleep, Disp: 90 tablet, Rfl: 0    PAST MEDICAL & SURGICAL HISTORY     Past Medical History:   Diagnosis Date    ADHD, adult residual type     Cataract     Left eye    Depression     Diabetes mellitus (HCC)     blood sugar 167 on admission    Equinus deformity of foot     GERD (gastroesophageal reflux disease)     Homicidal ideations     Hyperlipidemia     Hypertension     Microalbuminuria     Morbid obesity (HCC)     Neuropathy     Sleep apnea     CPAP at bedtime    Suicidal intent      Past Surgical History:   Procedure Laterality Date    CATARACT EXTRACTION      HAND SURGERY Right     OTHER SURGICAL HISTORY      REPAIR OF SUPERFICIAL WOUND FACE    TX ESOPHAGOGASTRODUODENOSCOPY TRANSORAL DIAGNOSTIC N/A 6/14/2018    Procedure: ESOPHAGOGASTRODUODENOSCOPY (EGD); Surgeon: Anant Jones MD;  [facility]: [facility];   Service: Gastroenterology    TX ESOPHAGOGASTRODUODENOSCOPY TRANSORAL DIAGNOSTIC N/A 2018    Procedure: EGD AND COLONOSCOPY;  Surgeon: Naseem Armenta MD;  Location: [facility];   Service: Gastroenterology     SOCIAL & FAMILY HISTORY     Social History     Socioeconomic History    Marital status: /Civil Union     Spouse name: Not on file    Number of children: 1    Years of education: Not on file    Highest education level: Not on file   Occupational History    Occupation: On disability   Social Needs    Financial resource strain: Not hard at all   [place] insecurity:     Worry: Never true     Inability: Never true   [employer] needs:     Medical: No     Non-medical: No   Tobacco Use    Smoking status: Former Smoker     Types: Cigarettes     Last attempt to quit: 1985     Years since quittin 8    Smokeless tobacco: Former User     Types: Chew    Tobacco comment: pt "Quit after approx over 25 years ago"   Substance and Sexual Activity    Alcohol use: Yes     Comment: rarely    Drug use: No     Comment: quit 20 years ago    Sexual activity: Yes     Partners: Female     Birth control/protection: None     Comment: steady girlfriend   Lifestyle    Physical activity:     Days per week: 0 days     Minutes per session: 0 min    Stress: Not on file   Relationships    Social connections:     Talks on phone: Not on file     Gets together: Not on file     Attends [employer] service: Not on file     Active member of club or organization: Not on file     Attends meetings of clubs or organizations: Not on file     Relationship status: Not on file    Intimate partner violence:     Fear of current or ex partner: Patient refused     Emotionally abused: Patient refused     Physically abused: Patient refused     Forced sexual activity: Patient refused   Other Topics Concern    Not on file   Social History Narrative     from spouse, technically still  but living with other partner, partner's father, and early 2019, his partner's daughter and boyfriend moved in with their two children  Children: 1 stepdaughter         Education:    Pt denies any hx of learning disabilities and reached childhood milestones on time as far as he knows  He wore braces for equinovarus but states he did not suffer delay in walking    Graduated High School --he was held back 3 times because "I was just lazy, didn't do the work "    No college    Took some core classes in [employer] and worked as a volunteer  from approx [phone]             Substance Abuse History:     Pt drinks ETOH approx q 3-4 months but denies any h/o ETOH abuse  Pt tried smoking Crack once in the past and has been smoking THC once q 2-3 months since 14y/o  He denies other drug use, IVDA, addictions or drug abuse  Social History     Substance and Sexual Activity   Alcohol Use Yes    Comment: rarely     Social History     Substance and Sexual Activity   Drug Use No    Comment: quit 20 years ago     Social History     Tobacco Use   Smoking Status Former Smoker    Types: Cigarettes    Last attempt to quit: Noa Corrigan Years since quittin 8   Smokeless Tobacco Former User    Types: [employer] [employer]    pt "Quit after approx over 25 years ago"     Family History   Problem Relation Age of Onset    Diabetes Mother     Hypertension Mother     Alcohol abuse Father     Diabetes Father     Hypertension Father     Alcohol abuse Sister     Depression Sister     Alcohol abuse Family      ==  Natacha Milian DO  Resident, PGY-2  [facility]  Internal Medicine [facility]  [address]   [facility] , [address] [address] [facility], [address] [facility]  Office: [phone]  Fax: [phone]

## 2019-11-01 ENCOUNTER — OFFICE VISIT (OUTPATIENT)
Dept: INTERNAL MEDICINE CLINIC | Facility: CLINIC | Age: 54
End: 2019-11-01

## 2019-11-01 VITALS
TEMPERATURE: 98.3 F | SYSTOLIC BLOOD PRESSURE: 120 MMHG | WEIGHT: 315 LBS | DIASTOLIC BLOOD PRESSURE: 74 MMHG | HEART RATE: 100 BPM | BODY MASS INDEX: 50.62 KG/M2 | HEIGHT: 66 IN

## 2019-11-01 DIAGNOSIS — N18.30 BENIGN HYPERTENSION WITH CKD (CHRONIC KIDNEY DISEASE) STAGE III (HCC): ICD-10-CM

## 2019-11-01 DIAGNOSIS — Z23 NEED FOR INFLUENZA VACCINATION: ICD-10-CM

## 2019-11-01 DIAGNOSIS — Z79.4 TYPE 2 DIABETES MELLITUS WITH HYPERGLYCEMIA, WITH LONG-TERM CURRENT USE OF INSULIN (HCC): Primary | ICD-10-CM

## 2019-11-01 DIAGNOSIS — G47.33 OSA (OBSTRUCTIVE SLEEP APNEA): ICD-10-CM

## 2019-11-01 DIAGNOSIS — I12.9 BENIGN HYPERTENSION WITH CKD (CHRONIC KIDNEY DISEASE) STAGE III (HCC): ICD-10-CM

## 2019-11-01 DIAGNOSIS — L60.8 TOENAIL DEFORMITY: Chronic | ICD-10-CM

## 2019-11-01 DIAGNOSIS — J06.9 VIRAL UPPER RESPIRATORY TRACT INFECTION: ICD-10-CM

## 2019-11-01 DIAGNOSIS — E11.65 TYPE 2 DIABETES MELLITUS WITH HYPERGLYCEMIA, WITH LONG-TERM CURRENT USE OF INSULIN (HCC): Primary | ICD-10-CM

## 2019-11-01 DIAGNOSIS — E11.42 DIABETIC POLYNEUROPATHY ASSOCIATED WITH TYPE 2 DIABETES MELLITUS (HCC): ICD-10-CM

## 2019-11-01 DIAGNOSIS — F31.61 BIPOLAR DISORDER, CURRENT EPISODE MIXED, MILD (HCC): ICD-10-CM

## 2019-11-01 DIAGNOSIS — E66.01 MORBID OBESITY WITH BMI OF 50.0-59.9, ADULT (HCC): ICD-10-CM

## 2019-11-01 PROBLEM — K22.70 BARRETT'S ESOPHAGUS WITHOUT DYSPLASIA: Status: RESOLVED | Noted: 2019-03-04 | Resolved: 2019-11-01

## 2019-11-01 PROCEDURE — G0008 ADMIN INFLUENZA VIRUS VAC: HCPCS | Performed by: PHYSICIAN ASSISTANT

## 2019-11-01 PROCEDURE — 90682 RIV4 VACC RECOMBINANT DNA IM: CPT | Performed by: PHYSICIAN ASSISTANT

## 2019-11-01 PROCEDURE — 99214 OFFICE O/P EST MOD 30 MIN: CPT | Performed by: PHYSICIAN ASSISTANT

## 2019-11-01 RX ORDER — BENZONATATE 100 MG/1
100 CAPSULE ORAL 3 TIMES DAILY PRN
Qty: 20 CAPSULE | Refills: 0 | Status: SHIPPED | OUTPATIENT
Start: 2019-11-01 | End: 2020-09-03 | Stop reason: ALTCHOICE

## 2019-11-01 RX ORDER — FLASH GLUCOSE SENSOR
1 KIT MISCELLANEOUS
Qty: 8 EACH | Refills: 1 | Status: SHIPPED | OUTPATIENT
Start: 2019-11-01 | End: 2020-02-07 | Stop reason: CLARIF

## 2019-11-01 RX ORDER — FLASH GLUCOSE SCANNING READER
1 EACH MISCELLANEOUS 4 TIMES DAILY
Qty: 1 DEVICE | Refills: 0 | Status: SHIPPED | OUTPATIENT
Start: 2019-11-01 | End: 2020-02-07 | Stop reason: CLARIF

## 2019-11-01 NOTE — PROGRESS NOTES
Assessment/Plan:  I have sent a different medication to your pharmacy for your cough  This will not affect your blood pressure or your sugar levels  As reviewed we are going to move up your Podiatry appointment as you need your nails clipped  We also reviewed the importance of using lotion on your feet at least once or twice every day  The more dry that they get this increases your risk that the skin will break open leading to infections  As per your request we will try to get you approved for the freestyle Clarence to make it easier to monitor your sugar levels  At this time will continue you on your long-acting insulin 50 units twice a day  Your rapid acting Humalog if your before meal number is 150 or less take 20 units, if 150-250 take the 32 units and if greater than 250 take 34 units  As reviewed you also have an appointment with your nephrologist in December with labs due at the end of this month before that  You are scheduled to start care with Endocrinology in March  Diabetic foot exam completed today and as noted you do have decreased sensation and need to protect your feet and check them every day  Diabetic eye exam is due May of 2020  Continue follow-up and medications with your mental health providers as scheduled  Flu vaccine provided today  Script provided for labs as dated in 3 months  New blood sugar logs provided today  No problem-specific Assessment & Plan notes found for this encounter  Diagnoses and all orders for this visit:    Type 2 diabetes mellitus with hyperglycemia, with long-term current use of insulin (HCC)  -     Continuous Blood Gluc Sensor (FREESTYLE CLARENCE 14 DAY SENSOR) MISC; 1 application by Does not apply route every 14 (fourteen) days  -     Continuous Blood Gluc  (FREESTYLE CLARENCE 14 DAY READER) ANTONIA; 1 Device by Does not apply route 4 (four) times a day  -     Comprehensive metabolic panel;  Future  -     Hemoglobin A1C; Future  - Ambulatory referral to Podiatry; Future    Benign hypertension with CKD (chronic kidney disease) stage III ([facility])    Viral upper respiratory tract infection  -     benzonatate (TESSALON PERLES) 100 mg capsule; Take 1 capsule (100 mg total) by mouth 3 (three) times a day as needed for cough    DAISY (obstructive sleep apnea)    Toenail deformity  -     Ambulatory referral to Podiatry; Future    Diabetic polyneuropathy associated with type 2 diabetes mellitus ([facility] )    Morbid obesity with BMI of 50 0-59 9, adult ([facility])    Bipolar disorder, current episode mixed, mild ([facility] )    Need for influenza vaccination  -     influenza vaccine, 2018-4717, quadrivalent, recombinant, PF, 0 5 mL, for patients 18 yr+ (FLUBLOK)          Subjective:      Patient ID: London Moore is a 49 y o  male  Pt here to follow up on chronic medical conditions and lab review  Reviewed with patient that his A1c has improved this time  It was 9 9% it is now 8 5%  Patient is happy with the improvement although he is aware that he is not at goal for treatment  This has been a struggle with this patient since coming to our office for treatment  Patient initially was resistant to some of the treatment options however subsequently has been agreeable to insulin  However on many occasions patient was also seen by our former pharmacy clinic for assistance in his medication instructions and patient would frequently change the dosing as well as frequency and which insulin he was using based on what he thought he needed  For recently however patient has been more receptive to instructions  At last visit when patient sugar actually increased once again he was actually agreeable to basal bolus  Patient was reporting a few times he had numbers that were lower in the morning therefore we split his long-acting dose in half and taking it twice a day    Patient is aware that as per discussion at last visit this would mean 5 injections daily but he was fine with that  Patient did not bring his machine or his sugar logs to the visit with him today  Last logs he dropped off back in August shows that his numbers remained erratic  He had readings anywhere from 89 all the way up to 300  Patient however has confirmed today as  in the past that he is not always good about following dietary advice or meal planning  And fact patient reported this morning his a m  Fasting was 300 however he tells me that he expected it to be this high because he when out to dinner last night a different foods and also had 2 alcoholic drinks  Patient may benefit from a insulin pump or other non insulin injectables for better balance because his meal planning is not consistent  Patient is able to confirm the dosing of his insulin with Lantus 50 units twice a day and his Humalog 32 units but states only taking it 2 to 3 times a day before meals reporting if his sugar is 150 or less he does not take the insulin  Advised patient we will continue him on his Lantus 50 units twice a day  Reviewed with patient that for his pre meal checking if his sugar is less than 150 he may cut his Humalog dose to 16 but not to skip it completely  If his sugar before meal is 150-250 he may continue the 32 units however if it is greater than 250 to increase this to 34 units  Patient also instructed that he needs to contact our office if he has any low readings  Patient has been maintained on his gabapentin 300 mg twice daily for diabetic polyneuropathy  This had been decreased secondary to kidney functions  Patient is scheduled to follow with endocrinology in March of 2020    Diabetic eye exam due May of 2020      Patient's diabetic foot exam completed today and as noted his skin not just on his feet but bilateral lower extremities is significantly dry  Patient states he never uses lotion on his feet it is also noted that patient's toenails are significantly elongated    He is scheduled with Podiatry but not until January and advise we will get an earlier appointment because he does need his nails cut  Patient also had decreased sensation to vibration left greater than right  [facility]  No adjustments to his CPAP was needed  Patient reports he has been compliant with use  Patient continues to follow with Nephrology for his CKD secondary to his hypertension and diabetes  Patient is aware that he has labs due at the end of November prior to his appointment with the nephrologist in December  Patient is levels remained somewhat stable  Patient is aware that the only way to prevent worsening of his chronic kidney disease is to maintain good blood pressure as well as diabetic control  While patient's blood pressure has typically been well controlled as noted his diabetes has not ever been well controlled  Was here 10/29 for viral URI admits congestion not as bad just the cough especially as night and as noted has C-pap  Will change med as coricidin not helping the cough  Patient continues to follow with his mental health therapist and psychiatrist for his bipolar disorder  Patient reports he is on same dose medications  As noted on previous conversations patient would benefit from weight management consultation and possible surgery secondary to his chronic conditions  Patient however continues to declined this service  Patient has been much more agreeable most recently regarding his care and hopeful at follow-up visit we will rediscuss this topic  Patient was following with GI for Schatzki's ring and possible Tolbert's esophagitis, last EGD September of 2018 with biopsies confirmed patient did not have Tolbert's esophagitis               The following portions of the patient's history were reviewed and updated as appropriate: allergies, current medications, past family history, past medical history, past social history, past surgical history and problem list     Review of Systems   Constitutional: Negative  Negative for activity change, chills, fever and unexpected weight change  HENT: Positive for congestion  Negative for trouble swallowing and voice change  Respiratory: Positive for cough  Negative for chest tightness and wheezing  Cardiovascular: Positive for leg swelling  Negative for chest pain and palpitations  Gastrointestinal: Negative  Negative for abdominal pain, constipation, diarrhea, nausea and vomiting  Endocrine: Positive for polyphagia and polyuria  Negative for cold intolerance, heat intolerance and polydipsia  Genitourinary: Positive for frequency  Negative for urgency  Skin: Negative  Negative for rash and wound  Neurological: Positive for numbness (feet sometimes hand)  Negative for dizziness, tremors, syncope, weakness and headaches  Psychiatric/Behavioral: Positive for dysphoric mood and sleep disturbance  Objective:      /74 (BP Location: Right arm, Patient Position: Sitting, Cuff Size: Large)   Pulse 100   Temp 98 3 °F (36 8 °C) (Oral)   Ht 5' 6" (1 676 m)   Wt (!) 157 kg (346 lb 9 oz)   BMI 55 94 kg/m²          Physical Exam   Constitutional: He is oriented to person, place, and time  He appears well-developed and well-nourished  No distress  Pleasant no distress morbidly obese  male   HENT:   Head: Normocephalic and atraumatic  Mouth/Throat: Oropharynx is clear and moist    Eyes: Pupils are equal, round, and reactive to light  Conjunctivae and EOM are normal    Neck: Normal range of motion  Neck supple  No JVD present  Cardiovascular: Normal rate, regular rhythm and normal heart sounds  Exam reveals no friction rub  Pulses are weak pulses  No murmur heard  Pulses:       Dorsalis pedis pulses are 1+ on the right side, and 1+ on the left side  Pulmonary/Chest: Effort normal and breath sounds normal  He has no wheezes  Abdominal: Soft   Bowel sounds are normal  He exhibits no distension  There is no tenderness  There is no guarding  Musculoskeletal: He exhibits edema (1+ nonpitting edema  )  As noted on patient's diabetic foot exam his toenails are significantly elongated bilaterally  Patient will need Podiatry for cutting  Feet:   Right Foot:   Skin Integrity: Positive for callus and dry skin  Negative for skin breakdown  Left Foot:   Skin Integrity: Positive for callus and dry skin  Negative for skin breakdown  Lymphadenopathy:     He has no cervical adenopathy  Neurological: He is alert and oriented to person, place, and time  He displays normal reflexes  A sensory deficit is present  He exhibits normal muscle tone  Skin: Skin is warm and dry  No rash noted  As noted with patient's diabetic foot exam his feet and lower extremities are extremely dry  Skin however is currently intact with no fissures or wounds  Psychiatric: He has a normal mood and affect  His behavior is normal  Thought content normal        Patient's shoes and socks removed  Right Foot/Ankle   Right Foot Inspection  Skin Exam: dry skin, callus and callus skin not intact and no maceration                            Sensory   Vibration: intact    Monofilament testing: intact  Vascular    The right DP pulse is 1+  Left Foot/Ankle  Left Foot Inspection  Skin Exam: dry skin and callusskin not intact and no maceration                                         Sensory   Vibration: diminished    Monofilament: intact  Vascular    The left DP pulse is 1+  Assign Risk Category:  No deformity present;  Loss of protective sensation; Weak pulses       Risk: 2

## 2019-11-01 NOTE — PATIENT INSTRUCTIONS
I have sent a different medication to your pharmacy for your cough  This will not affect your blood pressure or your sugar levels  As reviewed we are going to move up your Podiatry appointment as you need your nails clipped  We also reviewed the importance of using lotion on your feet at least once or twice every day  The more dry that they get this increases your risk that the skin will break open leading to infections  As per your request we will try to get you approved for the freestyle Clarence to make it easier to monitor your sugar levels  At this time will continue you on your long-acting insulin 50 units twice a day  Your rapid acting Humalog if your before meal number is 150 or less take 20 units, if 150-250 take the 32 units and if greater than 250 take 34 units  As reviewed you also have an appointment with your nephrologist in December with labs due at the end of this month before that  You are scheduled to start care with Endocrinology in March  Diabetic foot exam completed today and as noted you do have decreased sensation and need to protect your feet and check them every day  Diabetic eye exam is due May of 2020  Continue follow-up and medications with your mental health providers as scheduled  Flu vaccine provided today  Script provided for labs as dated in 3 months    New blood sugar logs provided today

## 2019-11-04 ENCOUNTER — OFFICE VISIT (OUTPATIENT)
Dept: MULTI SPECIALTY CLINIC | Facility: CLINIC | Age: 54
End: 2019-11-04

## 2019-11-04 VITALS
DIASTOLIC BLOOD PRESSURE: 70 MMHG | BODY MASS INDEX: 50.62 KG/M2 | HEART RATE: 104 BPM | TEMPERATURE: 97.9 F | WEIGHT: 315 LBS | HEIGHT: 66 IN | SYSTOLIC BLOOD PRESSURE: 120 MMHG

## 2019-11-04 DIAGNOSIS — Z79.4 TYPE 2 DIABETES MELLITUS WITH HYPERGLYCEMIA, WITH LONG-TERM CURRENT USE OF INSULIN (HCC): Primary | ICD-10-CM

## 2019-11-04 DIAGNOSIS — B35.1 ONYCHOMYCOSIS: ICD-10-CM

## 2019-11-04 DIAGNOSIS — E11.65 TYPE 2 DIABETES MELLITUS WITH HYPERGLYCEMIA, WITH LONG-TERM CURRENT USE OF INSULIN (HCC): Primary | ICD-10-CM

## 2019-11-04 PROCEDURE — 99213 OFFICE O/P EST LOW 20 MIN: CPT | Performed by: PODIATRIST

## 2019-11-04 PROCEDURE — 11721 DEBRIDE NAIL 6 OR MORE: CPT | Performed by: PODIATRIST

## 2019-11-04 NOTE — PROGRESS NOTES
Podiatry Clinic Visit  Marleni Sellers 49 y o  male MRN: [id number]  Encounter: [id number]    Assessment/Plan        Diagnoses and all orders for this visit:    Type 2 diabetes mellitus with hyperglycemia, with long-term current use of insulin ([address] )    Onychomycosis           Plan:   Patient was seen/examined  All questions and concerns addressed   LA DM risk level 0   Mycotic nails were debrided in thickness and length x 10, using a nail nipper to patient's satisfaction without incident  Dr Manish Wilson was present during the procedure   Patient was re-educated on appropriate diabetic foot care   RTC in 6 months      History of Present Illness     Mr Natacha Brito is a diabetic who states he checks his blood sugar regularly  He reports his last A1c was 8 5%  He acknowledges tingling in his feet but states his primary care physician is currently titrating his gabapentin prescription  He would like his nails to be trimmed more frequently and complains they are elongated  He has no further pedal complaints at this time  Review of Systems   Constitutional: Negative  HENT: Negative  Eyes: Negative  Respiratory: Negative  Cardiovascular: Negative  Gastrointestinal: Negative  Musculoskeletal: negative  Skin:  Elongated nails times 10  Neurological: Negative          Historical Information   Past Medical History:   Diagnosis Date    ADHD, adult residual type     Cataract     Left eye    Depression     Diabetes mellitus (HCC)     blood sugar 167 on admission    Equinus deformity of foot     GERD (gastroesophageal reflux disease)     Homicidal ideations     Hyperlipidemia     Hypertension     Microalbuminuria     Morbid obesity (HCC)     Neuropathy     Sleep apnea     CPAP at bedtime    Suicidal intent      Past Surgical History:   Procedure Laterality Date    CATARACT EXTRACTION      HAND SURGERY Right     OTHER SURGICAL HISTORY      REPAIR OF SUPERFICIAL WOUND FACE    [place] ESOPHAGOGASTRODUODENOSCOPY TRANSORAL DIAGNOSTIC N/A 2018    Procedure: ESOPHAGOGASTRODUODENOSCOPY (EGD); Surgeon: Lord Domingo MD;  Location: [facility]; Service: Gastroenterology    [place] ESOPHAGOGASTRODUODENOSCOPY TRANSORAL DIAGNOSTIC N/A 2018    Procedure: EGD AND COLONOSCOPY;  Surgeon: Lord Domingo MD;  Location: [facility];   Service: Gastroenterology     Social History   Social History     Substance and Sexual Activity   Alcohol Use Yes    Frequency: Monthly or less    Drinks per session: 1 or 2    Binge frequency: Never    Comment: rarely     Social History     Substance and Sexual Activity   Drug Use Never    Comment: quit 20 years ago     Social History     Tobacco Use   Smoking Status Former Smoker    Types: Cigarettes    Last attempt to quit: Robbie Bowels Years since quittin 8   Smokeless Tobacco Former User    Types: Chew   Tobacco Comment    pt "Quit after approx over 25 years ago"     Family History:   Family History   Problem Relation Age of Onset    Diabetes Mother     Hypertension Mother     Alcohol abuse Father     Diabetes Father     Hypertension Father     Alcohol abuse Sister     Depression Sister     Alcohol abuse Family        Meds/Allergies     (Not in a hospital admission)  Allergies   Allergen Reactions    Pollen Extract Nasal Congestion    Tetanus Toxoid Swelling       Objective     Current Vitals:   Blood Pressure: 120/70 (19 1422)  Pulse: 104 (19 1422)  Temperature: 97 9 °F (36 6 °C) (19 142)  Temp Source: Oral (19 142)  Height: 5' 6" (167 6 cm) (19 142)  Weight - Scale: (!) 158 kg (348 lb 12 3 oz) (19 1422)        /70 (BP Location: Right arm, Patient Position: Sitting, Cuff Size: Large)   Pulse 104   Temp 97 9 °F (36 6 °C) (Oral)   Ht 5' 6" (1 676 m)   Wt (!) 158 kg (348 lb 12 3 oz)   BMI 56 29 kg/m²       Lower Extremity Exam:    Foot Exam    Musculoskeletal:  MMT is 5/5 to all compartments of the LE, +1/4 edema B/L, Hallux limitus is not present  Equinus is present  Vascular:   R DP is +2/4, R PT is +2/4, L DP is +2/4, L PT is +2/4, CFT< 3sec to all digits  Pedal hair is Absent  regular rate and rhythm  Skin:  No open Lesions  Skin of the LE is normal texture, temperature, turgor  Interdigital maceration is not present  Neurologic:  Gross sensation is intact  Protective sensation is Intact  Vibratory sensation is Intact  Sharp sensation is present
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
## 2019-11-07 ENCOUNTER — OFFICE VISIT (OUTPATIENT)
Dept: PSYCHIATRY | Facility: CLINIC | Age: 54
End: 2019-11-07
Payer: MEDICARE

## 2019-11-07 VITALS — DIASTOLIC BLOOD PRESSURE: 89 MMHG | SYSTOLIC BLOOD PRESSURE: 164 MMHG | HEART RATE: 87 BPM

## 2019-11-07 DIAGNOSIS — F31.61 BIPOLAR DISORDER, CURRENT EPISODE MIXED, MILD (HCC): ICD-10-CM

## 2019-11-07 DIAGNOSIS — F41.0 PANIC ATTACKS: ICD-10-CM

## 2019-11-07 DIAGNOSIS — F41.1 GENERALIZED ANXIETY DISORDER: Primary | ICD-10-CM

## 2019-11-07 DIAGNOSIS — E66.01 MORBID OBESITY WITH BMI OF 50.0-59.9, ADULT (HCC): ICD-10-CM

## 2019-11-07 DIAGNOSIS — E11.42 DIABETIC POLYNEUROPATHY ASSOCIATED WITH TYPE 2 DIABETES MELLITUS (HCC): ICD-10-CM

## 2019-11-07 DIAGNOSIS — G47.00 INSOMNIA, UNSPECIFIED TYPE: ICD-10-CM

## 2019-11-07 PROCEDURE — 96127 BRIEF EMOTIONAL/BEHAV ASSMT: CPT | Performed by: PHYSICIAN ASSISTANT

## 2019-11-07 PROCEDURE — 99214 OFFICE O/P EST MOD 30 MIN: CPT | Performed by: PHYSICIAN ASSISTANT

## 2019-11-07 RX ORDER — BUPROPION HYDROCHLORIDE 100 MG/1
100 TABLET ORAL DAILY
Qty: 90 TABLET | Refills: 0 | Status: SHIPPED | OUTPATIENT
Start: 2019-11-07 | End: 2020-01-17 | Stop reason: SDUPTHER

## 2019-11-07 RX ORDER — TRAZODONE HYDROCHLORIDE 50 MG/1
50 TABLET ORAL
Qty: 90 TABLET | Refills: 0 | Status: SHIPPED | OUTPATIENT
Start: 2019-11-07 | End: 2020-01-17 | Stop reason: SDUPTHER

## 2019-11-07 RX ORDER — CLONAZEPAM 1 MG/1
1 TABLET ORAL DAILY PRN
Qty: 30 TABLET | Refills: 1 | Status: SHIPPED | OUTPATIENT
Start: 2019-11-07 | End: 2020-01-17 | Stop reason: SDUPTHER

## 2019-11-07 RX ORDER — BUSPIRONE HYDROCHLORIDE 10 MG/1
10 TABLET ORAL 2 TIMES DAILY
Qty: 180 TABLET | Refills: 0 | Status: SHIPPED | OUTPATIENT
Start: 2019-11-07 | End: 2020-01-17 | Stop reason: SDUPTHER

## 2019-11-15 LAB
LEFT EYE DIABETIC RETINOPATHY: NORMAL
RIGHT EYE DIABETIC RETINOPATHY: NORMAL

## 2019-11-21 DIAGNOSIS — IMO0002 UNCONTROLLED TYPE 2 DIABETES MELLITUS WITH DIABETIC POLYNEUROPATHY, WITH LONG-TERM CURRENT USE OF INSULIN: ICD-10-CM

## 2019-11-21 RX ORDER — LANCETS 28 GAUGE
EACH MISCELLANEOUS
Qty: 300 EACH | Refills: 0 | Status: SHIPPED | OUTPATIENT
Start: 2019-11-21 | End: 2020-02-24

## 2019-11-21 RX ORDER — BLOOD-GLUCOSE METER
KIT MISCELLANEOUS
Qty: 300 EACH | Refills: 0 | Status: SHIPPED | OUTPATIENT
Start: 2019-11-21 | End: 2020-06-09 | Stop reason: SDUPTHER

## 2019-12-02 ENCOUNTER — SOCIAL WORK (OUTPATIENT)
Dept: BEHAVIORAL/MENTAL HEALTH CLINIC | Facility: CLINIC | Age: 54
End: 2019-12-02
Payer: MEDICARE

## 2019-12-02 DIAGNOSIS — F31.61 BIPOLAR DISORDER, CURRENT EPISODE MIXED, MILD (HCC): Primary | ICD-10-CM

## 2019-12-02 DIAGNOSIS — G47.00 INSOMNIA, UNSPECIFIED TYPE: ICD-10-CM

## 2019-12-02 DIAGNOSIS — F41.1 GENERALIZED ANXIETY DISORDER: ICD-10-CM

## 2019-12-02 DIAGNOSIS — F41.0 PANIC ATTACKS: ICD-10-CM

## 2019-12-02 PROCEDURE — 90834 PSYTX W PT 45 MINUTES: CPT | Performed by: SOCIAL WORKER

## 2019-12-02 NOTE — PSYCH
Psychotherapy Provided: Individual Psychotherapy 45 minutes     Length of time in session: 45 minutes, follow up in 2 week    Goals addressed in session: Goal 1, Goal 2 and Goal 3      Pain:      moderate to severe    0    Current suicide risk : Low     Data: The client arrived for his session  My girlfriend Marie's daughter Romy Toney and her boyfriend broke up  He moved out to his mom's house  My girlfriend's daughter will not go after him for child support  Regarding goal 1 he is eating healthier but he still is not exercising  Regarding goal 2 he is coping better with difficult people  Regarding his 3rd goal he claims he is doing better at managing his depression  He feels between his medications and therapy he is doing better  Assessment: Cyndy Diaz is glad his daughter in law has had her partner leave  However she makes no income and is not going after him for support  Cyndy iDaz however feels he is doing better in regards to his goals  Plan: Continue to skill build in distress tolerance  Behavioral Health Treatment Plan [facility]: Diagnosis and Treatment Plan explained to Natasha Noel relates understanding diagnosis and is agreeable to Treatment Plan   Yes

## 2019-12-06 ENCOUNTER — APPOINTMENT (OUTPATIENT)
Dept: LAB | Facility: CLINIC | Age: 54
End: 2019-12-06
Payer: MEDICARE

## 2019-12-06 DIAGNOSIS — D63.1 ANEMIA DUE TO STAGE 3 CHRONIC KIDNEY DISEASE (HCC): ICD-10-CM

## 2019-12-06 DIAGNOSIS — N18.30 ANEMIA DUE TO STAGE 3 CHRONIC KIDNEY DISEASE (HCC): ICD-10-CM

## 2019-12-06 DIAGNOSIS — N18.30 STAGE 3 CHRONIC KIDNEY DISEASE (HCC): ICD-10-CM

## 2019-12-06 LAB
ANION GAP SERPL CALCULATED.3IONS-SCNC: 5 MMOL/L (ref 4–13)
BUN SERPL-MCNC: 12 MG/DL (ref 5–25)
CALCIUM SERPL-MCNC: 9.1 MG/DL (ref 8.3–10.1)
CHLORIDE SERPL-SCNC: 101 MMOL/L (ref 100–108)
CO2 SERPL-SCNC: 30 MMOL/L (ref 21–32)
CREAT SERPL-MCNC: 1.83 MG/DL (ref 0.6–1.3)
CREAT UR-MCNC: 126 MG/DL
ERYTHROCYTE [DISTWIDTH] IN BLOOD BY AUTOMATED COUNT: 14.6 % (ref 11.6–15.1)
FERRITIN SERPL-MCNC: 159 NG/ML (ref 8–388)
GFR SERPL CREATININE-BSD FRML MDRD: 48 ML/MIN/1.73SQ M
GLUCOSE P FAST SERPL-MCNC: 173 MG/DL (ref 65–99)
HCT VFR BLD AUTO: 33.6 % (ref 36.5–49.3)
HGB BLD-MCNC: 11.1 G/DL (ref 12–17)
IRON SATN MFR SERPL: 19 %
IRON SERPL-MCNC: 41 UG/DL (ref 65–175)
MAGNESIUM SERPL-MCNC: 1.5 MG/DL (ref 1.6–2.6)
MCH RBC QN AUTO: 25.3 PG (ref 26.8–34.3)
MCHC RBC AUTO-ENTMCNC: 33 G/DL (ref 31.4–37.4)
MCV RBC AUTO: 77 FL (ref 82–98)
PHOSPHATE SERPL-MCNC: 3.9 MG/DL (ref 2.7–4.5)
PLATELET # BLD AUTO: 189 THOUSANDS/UL (ref 149–390)
PMV BLD AUTO: 10.3 FL (ref 8.9–12.7)
POTASSIUM SERPL-SCNC: 4.5 MMOL/L (ref 3.5–5.3)
PROT UR-MCNC: 77 MG/DL
PROT/CREAT UR: 0.61 MG/G{CREAT} (ref 0–0.1)
RBC # BLD AUTO: 4.38 MILLION/UL (ref 3.88–5.62)
SODIUM SERPL-SCNC: 136 MMOL/L (ref 136–145)
TIBC SERPL-MCNC: 211 UG/DL (ref 250–450)
WBC # BLD AUTO: 9.07 THOUSAND/UL (ref 4.31–10.16)

## 2019-12-06 PROCEDURE — 85027 COMPLETE CBC AUTOMATED: CPT

## 2019-12-06 PROCEDURE — 84156 ASSAY OF PROTEIN URINE: CPT

## 2019-12-06 PROCEDURE — 83735 ASSAY OF MAGNESIUM: CPT

## 2019-12-06 PROCEDURE — 36415 COLL VENOUS BLD VENIPUNCTURE: CPT

## 2019-12-06 PROCEDURE — 80048 BASIC METABOLIC PNL TOTAL CA: CPT

## 2019-12-06 PROCEDURE — 82728 ASSAY OF FERRITIN: CPT

## 2019-12-06 PROCEDURE — 83540 ASSAY OF IRON: CPT

## 2019-12-06 PROCEDURE — 83550 IRON BINDING TEST: CPT

## 2019-12-06 PROCEDURE — 84100 ASSAY OF PHOSPHORUS: CPT

## 2019-12-06 PROCEDURE — 82570 ASSAY OF URINE CREATININE: CPT

## 2019-12-10 ENCOUNTER — OFFICE VISIT (OUTPATIENT)
Dept: NEPHROLOGY | Facility: CLINIC | Age: 54
End: 2019-12-10
Payer: MEDICARE

## 2019-12-10 VITALS — WEIGHT: 315 LBS | HEIGHT: 66 IN | BODY MASS INDEX: 50.62 KG/M2

## 2019-12-10 DIAGNOSIS — R79.89 ELEVATED SERUM CREATININE: ICD-10-CM

## 2019-12-10 DIAGNOSIS — N18.30 BENIGN HYPERTENSION WITH CKD (CHRONIC KIDNEY DISEASE) STAGE III (HCC): Primary | ICD-10-CM

## 2019-12-10 DIAGNOSIS — R80.1 PERSISTENT PROTEINURIA: ICD-10-CM

## 2019-12-10 DIAGNOSIS — Z79.4 TYPE 2 DIABETES MELLITUS WITH HYPERGLYCEMIA, WITH LONG-TERM CURRENT USE OF INSULIN (HCC): ICD-10-CM

## 2019-12-10 DIAGNOSIS — N18.30 STAGE 3 CHRONIC KIDNEY DISEASE (HCC): ICD-10-CM

## 2019-12-10 DIAGNOSIS — N18.30 ANEMIA DUE TO STAGE 3 CHRONIC KIDNEY DISEASE (HCC): ICD-10-CM

## 2019-12-10 DIAGNOSIS — I12.9 BENIGN HYPERTENSION WITH CKD (CHRONIC KIDNEY DISEASE) STAGE III (HCC): Primary | ICD-10-CM

## 2019-12-10 DIAGNOSIS — E66.01 MORBID OBESITY WITH BMI OF 50.0-59.9, ADULT (HCC): ICD-10-CM

## 2019-12-10 DIAGNOSIS — D63.1 ANEMIA DUE TO STAGE 3 CHRONIC KIDNEY DISEASE (HCC): ICD-10-CM

## 2019-12-10 DIAGNOSIS — E11.65 TYPE 2 DIABETES MELLITUS WITH HYPERGLYCEMIA, WITH LONG-TERM CURRENT USE OF INSULIN (HCC): ICD-10-CM

## 2019-12-10 PROCEDURE — 99214 OFFICE O/P EST MOD 30 MIN: CPT | Performed by: INTERNAL MEDICINE

## 2019-12-10 RX ORDER — IRON,CARBONYL/ASCORBIC ACID 100-250 MG
1 TABLET ORAL DAILY
Qty: 90 EACH | Refills: 3 | Status: SHIPPED | OUTPATIENT
Start: 2019-12-10 | End: 2020-06-03 | Stop reason: SDUPTHER

## 2019-12-10 NOTE — PATIENT INSTRUCTIONS
1  )  Low 2 g sodium diet    2 )  Monitor weights at home    3 )  Avoid NSAIDs    4 )  Monitor blood pressure at home, call if blood pressure greater than 150/90 persistently    5 ) Start Iron/Vitamin V

## 2019-12-10 NOTE — PROGRESS NOTES
NEPHROLOGY OFFICE VISIT   Fran Joel 49 y o  male MRN: [id number]  12/10/2019    Reason for Visit:  Chronic kidney disease    ASSESSMENT and PLAN:    I had the pleasure of seeing THE [facility] today in the renal clinic for the continued management of chronic kidney disease  He was last seen by the PA back in August was creatinine was found to be 1 6  On review of his blood work over the last couple months he has had some slow progression of his most recent creatinine being 1 8  Which is not too far off from the last couple months where has been closer to 1 7  He does not salt restrict his diet, he reports he is not on any special diet he he says that he is on a "See [employer]" diet where he will eat anything  Does not diet or exercise  Does not check his blood pressure at home on a persistent basis  Reports no chest pain shortness of breath or swelling the legs  His diabetes seems to be getting better in terms of its management with the last A1c improved to 8 5 back in October  1 ) Chronic kidney disease stage III with microalbuminuria  -baseline creatinine appears to be between 1 6-1 8 mg/dL  -most recent creatinine 1 8 which is on an upward trend repeated and few weeks  -renal ultrasound shows the right kidney measuring 10 8 cm in the left kidney measuring 10 2 cm  Right kidney has an upper pole cyst measuring about 1 2 cm    -urine protein at ratio 0 6  -presumed to be secondary to diabetic nephropathy due to evidence of microalbuminuria as well as it being poorly controlled for many years, with a component of hypertension and possible obesity  No family history of kidney disease    Possible APO-L1 associated nephropathy   -blood pressure and diabetic control  -educated on diet and weight loss   -avoid NSAIDs  -continue ACE-inhibitor    2 ) Proteinuria  -- presumed to be secondary to diabetic nephropathy possible obesity related  -- 24 hour urine protein or urine protein creatinine ratio:  0 6  -- Current Blood Pressure:  132/70  -- current anti proteinuric medications:  Lisinopril/hydrochlorothiazide  -- Interventions:  Weight loss, low 2 g sodium diet, diabetic and blood pressure control aiming for blood pressure less than 130/80, continue ACE-inhibitor, avoid NSAIDs  -- General Recommendations:   RAAS Blockade with high dose ARB or ACEI for target blood pressure < 130/80 as tolerated, with spironolactone as a good adjunct for anti proteinuric effect  Management of hypervolemia for blood pressure control as needed   Avoid combination ACEI + ARB, due to high adverse effects (ONTARGET study)   Direct Renin Inhibitor + ACEI or ARB has FDA black box warning for patients with diabetes and GFR < 60 cc/min due to risk for non fatal stroke, renal complications, hyperkalemia and hypotension (ALTITUDE study)   Moderate dietary protein restriction 0 8 gm/kg   Recommend statin therapy if no contraindications   Recommend 1, 25 vitamin-D such as Paricalcitol if appropropriate (VITAL study)    3 ) Diabetes mellitus type 2  -hemoglobin A1c: 8 5 (A1C values may be falsely elevated or decreased in those with CKD, assay method should be certified by [employer])  Target A1C < 7  -current medications:  Insulin lispro, insulin glargine  -proteinuria:  0 6  -retinopathy:  Yes  -neuropathy:  Yes  -please follow with an ophthalmologist and podiatrist every year  -continued self monitoring of blood glucose at home  -Treatment Management in CKD Recommendations:    Metformin:  Avoid if creatinine clearance is less than 30 cc/min (concern for lactic acidosis)   Sodium-Glucose Cotransporter-2 (SGLT2) Inhibitors: May see an acute drop in the eGFR initially when starting the medication but then a stabilization of the renal function, with slower loss of renal function as compared to placebo    Relative risk of end-stage renal disease, doubling of serum creatinine or death from renal causes were also found to be lower as compared to placebo  ([facility])  Would recommend starting at 100 mg daily, I would avoid use in patients with eGFR < 30 cc/min    If no contraindications exist I would recommend this medication added to the diabetic regiment   Sulfonylureas:  Preferred short-acting (glipizide, glimepiride, repaglinide), relatively safe in patients with non dialysis CKD   Thiazolidinedones/Alpha-Gluosidase inhibitors/Dipeptidyl peptidase-4 inhibitors:  Generally not considered first-line agents in CKD (limited data in long-term safety and efficacy)   Insulin:  Starting dose may need to be lower than what would ordinarily be used, as there is a decrease metabolism of insulin (no dose adjustment if the GFR > 50 mL/min, dose should be reduced to 75% of baseline if GFR 10-50 mL/min, and 50% baseline if GFR < 10 mL/min)    4 ) Hypertension  -- with underlying chronic kidney disease and proteinuria  -- blood pressure at target  -- target blood pressure less than 130/80  -- continue lisinopril/hydrochlorothiazide 20-25 mg daily  -- low 2 g sodium diet  -- weight loss and exercise program    5 ) Anemia of chronic kidney disease  -- iron deficiency  -- start iron with vitamin-C daily    6 ) Morbid obesity  -- recommend a diet exercise and weight loss program    PATIENT INSTRUCTIONS:    Patient Instructions   1 )  Low 2 g sodium diet    2 )  Monitor weights at home    3 )  Avoid NSAIDs    4 )  Monitor blood pressure at home, call if blood pressure greater than 150/90 persistently    5 ) Start Iron/Vitamin V          Orders Placed This Encounter   Procedures    Basic metabolic panel     Standing Status:   Future     Standing Expiration Date:   12/10/2020    Comprehensive metabolic panel     Standing Status:   Future     Standing Expiration Date:   12/10/2020    CBC     Standing Status:   Future     Standing Expiration Date:   12/10/2020    Iron Saturation %     Standing Status:   Future     Standing Expiration Date:   12/10/2020    Ferritin     Standing Status:   Future     Standing Expiration Date:   12/10/2020    PTH, intact     Standing Status:   Future     Standing Expiration Date:   12/10/2020    Vitamin D 25 hydroxy     Standing Status:   Future     Standing Expiration Date:   12/10/2020    Protein / creatinine ratio, urine     Standing Status:   Future     Standing Expiration Date:   12/10/2020       OBJECTIVE:  Current Weight: Weight - Scale: (!) 157 kg (346 lb)  Vitals:    12/10/19 1035   Weight: (!) 157 kg (346 lb)   Height: 5' 6" (1 676 m)    Body mass index is 55 85 kg/m²  REVIEW OF SYSTEMS:    Review of Systems   Constitutional: Negative for activity change and fever  Respiratory: Negative for cough, chest tightness, shortness of breath and wheezing  Cardiovascular: Negative for chest pain and leg swelling  Gastrointestinal: Negative for abdominal pain, diarrhea, nausea and vomiting  Endocrine: Negative for polyuria  Genitourinary: Negative for difficulty urinating, dysuria, flank pain, frequency and urgency  Skin: Negative for rash  Neurological: Negative for dizziness, syncope, light-headedness and headaches  PHYSICAL EXAM:      Physical Exam   Constitutional: He is oriented to person, place, and time  He appears well-developed and well-nourished  No distress  HENT:   Head: Normocephalic and atraumatic  Eyes: Pupils are equal, round, and reactive to light  No scleral icterus  Neck: Normal range of motion  Neck supple  Cardiovascular: Normal rate, regular rhythm and normal heart sounds  Exam reveals no gallop and no friction rub  No murmur heard  Pulmonary/Chest: Effort normal and breath sounds normal  No respiratory distress  He has no wheezes  He has no rales  He exhibits no tenderness  Abdominal: Soft  Bowel sounds are normal  He exhibits no distension  There is no tenderness  There is no rebound  Musculoskeletal: Normal range of motion  He exhibits no edema  Neurological: He is alert and oriented to person, place, and time  Skin: No rash noted  He is not diaphoretic  Psychiatric: He has a normal mood and affect  Nursing note and vitals reviewed        Medications:    Current Outpatient Medications:     amLODIPine (NORVASC) 5 mg tablet, TAKE 1 TABLET(5 MG) BY MOUTH DAILY, Disp: 90 tablet, Rfl: 0    atorvastatin (LIPITOR) 10 mg tablet, Take 1 tablet (10 mg total) by mouth daily for 180 days, Disp: 90 tablet, Rfl: 1    buPROPion (WELLBUTRIN) 100 mg tablet, Take 1 tablet (100 mg total) by mouth daily, Disp: 90 tablet, Rfl: 0    busPIRone (BUSPAR) 10 mg tablet, Take 1 tablet (10 mg total) by mouth 2 (two) times a day, Disp: 180 tablet, Rfl: 0    clonazePAM (KlonoPIN) 1 mg tablet, Take 1 tablet (1 mg total) by mouth daily as needed for anxiety (or panic attacks), Disp: 30 tablet, Rfl: 1    gabapentin (NEURONTIN) 300 mg capsule, Take 1 capsule (300 mg total) by mouth 2 (two) times a day for 180 days, Disp: 180 capsule, Rfl: 1    insulin glargine (LANTUS SOLOSTAR) 100 units/mL injection pen, Inject 50 Units under the skin every 12 (twelve) hours for 180 days, Disp: 10 pen, Rfl: 5    insulin lispro (HUMALOG KWIKPEN) 100 units/mL injection pen, Inject 32 Units under the skin 3 (three) times a day with meals for 180 days, Disp: 10 pen, Rfl: 5    lisinopril-hydrochlorothiazide (PRINZIDE,ZESTORETIC) 20-25 MG per tablet, TAKE 1 TABLET BY MOUTH DAILY, Disp: 90 tablet, Rfl: 0    lurasidone (LATUDA) 120 mg tablet, Take 1 tablet (120 mg total) by mouth daily with breakfast, Disp: 90 tablet, Rfl: 0    omeprazole (PriLOSEC) 20 mg delayed release capsule, TAKE 1 CAPSULE(20 MG) BY MOUTH DAILY  DAYS, Disp: 90 capsule, Rfl: 1    traZODone (DESYREL) 50 mg tablet, Take 1 tablet (50 mg total) by mouth daily at bedtime as needed for sleep, Disp: 90 tablet, Rfl: 0    benzonatate (TESSALON PERLES) 100 mg capsule, Take 1 capsule (100 mg total) by mouth 3 (three) times a day as needed for cough (Patient not taking: Reported on 12/10/2019), Disp: 20 capsule, Rfl: 0    Blood Glucose Monitoring Suppl (FREESTYLE LITE) ANTONIA, by Does not apply route 3 (three) times a day, Disp: 1 each, Rfl: 0    Continuous Blood Gluc  (FREESTYLE CHAPARRITA 14 DAY READER) ANTONIA, 1 Device by Does not apply route 4 (four) times a day, Disp: 1 Device, Rfl: 0    Continuous Blood Gluc Sensor (FREESTYLE CHAPARIRTA 14 DAY SENSOR) MISC, 1 application by Does not apply route every 14 (fourteen) days, Disp: 8 each, Rfl: 1    FREESTYLE LITE test strip, TEST THREE TIMES DAILY, Disp: 300 each, Rfl: 0    Insulin Pen Needle (B-D ULTRAFINE III SHORT PEN) 31G X 8 MM MISC, by Does not apply route, Disp: , Rfl:     INSULIN SYRINGE  5CC/29G 29G X 1/2" 0 5 ML MISC, by Does not apply route, Disp: , Rfl:     Iron-Vitamin C 100-250 MG TABS, Take 1 tablet by mouth daily, Disp: 90 each, Rfl: 3    Lancets (FREESTYLE) lancets, USE THREE TIMES DAILY AS DIRECTED, Disp: 300 each, Rfl: 0    Laboratory Results:  Results from last 7 days   Lab Units 12/06/19  1015   WBC Thousand/uL 9 07   HEMOGLOBIN g/dL 11 1*   HEMATOCRIT % 33 6*   PLATELETS Thousands/uL 189   POTASSIUM mmol/L 4 5   CHLORIDE mmol/L 101   CO2 mmol/L 30   BUN mg/dL 12   CREATININE mg/dL 1 83*   CALCIUM mg/dL 9 1   MAGNESIUM mg/dL 1 5*   PHOSPHORUS mg/dL 3 9       Results for orders placed or performed in visit on 04/84/66   Basic metabolic panel   Result Value Ref Range    Sodium 136 136 - 145 mmol/L    Potassium 4 5 3 5 - 5 3 mmol/L    Chloride 101 100 - 108 mmol/L    CO2 30 21 - 32 mmol/L    ANION GAP 5 4 - 13 mmol/L    BUN 12 5 - 25 mg/dL    Creatinine 1 83 (H) 0 60 - 1 30 mg/dL    Glucose, Fasting 173 (H) 65 - 99 mg/dL    Calcium 9 1 8 3 - 10 1 mg/dL    eGFR 48 ml/min/1 73sq m   CBC   Result Value Ref Range    WBC 9 07 4 31 - 10 16 Thousand/uL    RBC 4 38 3 88 - 5 62 Million/uL    Hemoglobin 11 1 (L) 12 0 - 17 0 g/dL    Hematocrit 33 6 (L) 36 5 - 49 3 %    MCV 77 (L) 82 - 98 fL    MCH 25 3 (L) 26 8 - 34 3 pg    MCHC 33 0 31 4 - 37 4 g/dL    RDW 14 6 11 6 - 15 1 %    Platelets 055 240 - 480 Thousands/uL    MPV 10 3 8 9 - 12 7 fL   Magnesium   Result Value Ref Range    Magnesium 1 5 (L) 1 6 - 2 6 mg/dL   Phosphorus   Result Value Ref Range    Phosphorus 3 9 2 7 - 4 5 mg/dL   Protein / creatinine ratio, urine   Result Value Ref Range    Creatinine, Ur 126 0 mg/dL    Protein Urine Random 77 mg/dL    Prot/Creat Ratio, Ur 0 61 (H) 0 00 - 0 10   Iron Saturation %   Result Value Ref Range    Iron Saturation 19 %    TIBC 211 (L) 250 - 450 ug/dL    Iron 41 (L) 65 - 175 ug/dL

## 2019-12-11 DIAGNOSIS — G47.00 INSOMNIA, UNSPECIFIED TYPE: ICD-10-CM

## 2019-12-11 DIAGNOSIS — F31.61 BIPOLAR DISORDER, CURRENT EPISODE MIXED, MILD (HCC): ICD-10-CM

## 2019-12-11 RX ORDER — TRAZODONE HYDROCHLORIDE 50 MG/1
TABLET ORAL
Qty: 90 TABLET | Refills: 0 | OUTPATIENT
Start: 2019-12-11

## 2019-12-11 RX ORDER — BUPROPION HYDROCHLORIDE 100 MG/1
TABLET ORAL
Qty: 90 TABLET | Refills: 0 | OUTPATIENT
Start: 2019-12-11

## 2019-12-11 RX ORDER — LURASIDONE HYDROCHLORIDE 120 MG/1
TABLET, FILM COATED ORAL
Qty: 90 TABLET | Refills: 0 | OUTPATIENT
Start: 2019-12-11

## 2020-01-03 DIAGNOSIS — E11.65 TYPE 2 DIABETES MELLITUS WITH HYPERGLYCEMIA, UNSPECIFIED WHETHER LONG TERM INSULIN USE (HCC): ICD-10-CM

## 2020-01-06 RX ORDER — INSULIN GLARGINE 100 [IU]/ML
INJECTION, SOLUTION SUBCUTANEOUS
Qty: 5 PEN | Refills: 1 | Status: SHIPPED | OUTPATIENT
Start: 2020-01-06 | End: 2021-09-22

## 2020-01-16 NOTE — PSYCH
MEDICATION MANAGEMENT NOTE        [facility]      Name and Date of Birth:  Homero Wakefield 49 y o  1965    Date of Visit: January 17, 2020    HPI:    Homero Wakefield is here for medication review with primary c/o "Not too bad at all  I had a few deaths, a friend of mine just lost his baby daughter "  Also a friend of Pt passed away 4 days ago and Pt will attend the wake tonight  He has "A little depression because of the deaths" --mainly sadness related to grief, otherwise, he was able to enjoy his holidays "Quite well "   He presently denies SI, HI, anxiety, panic attacks, or manic or psychotic Sxs  He still has not gone to the gym but intends to try to make this a new year's resolution to get there  Pt reports compliance to psychiatric medications without SE  Pt continues psychotherapy with Osvaldo Magallanes whose 12/2/2019 note I reviewed  Last Tx plan done 9/4/2019  He states the finances are OK--the bills have reduced since there is one less person in the house and the utilities have now dropped more  Appetite Changes and Sleep: normal sleep, normal appetite, normal energy level    Review Of Systems:      Constitutional negative   ENT negative   Cardiovascular negative   Respiratory negative   Gastrointestinal negative   Genitourinary negative   Musculoskeletal negative   Integumentary negative   Neurological negative   Endocrine negative   Other Symptoms all other systems are negative       Past Psychiatric History:   As copied from my 11/7/2019 note with updates as needed:  " [ Pt grew up with biological parents, 2 older sisters and 1 younger sister  He describes his upbringing as "We had a very loving family  "      He first experienced Sxs of a psychiatric nature in 2010 triggered by being layed off from his job and lost his house and truck  He was already  from his wife at that time and that was not contributing to his mood   (He had a steady girlfriend Marie at that time and it was a yoana relationship) His Sxs were many months of daily sadness, SI (no attempts or plan at that time), worry, hopelessness, worthlessness, lack of energy and motivation, (in later years also insomnia), and anxiety  He rarely had anxiety without simultaneous, depressive Sxs but always felt edgy  His girlfriend got him to go to the gym to work out  He also reports Sxs of anger and sometimes irritability, some irresponsible spending (it gave him pleasure to eat out just about every night of the week--to be around people or buying clothes and had put himself in debt at times), racing thoughts at night (moreso due to anxiety) He would spontaneously go away for the day (though someone always knew where he was going)       He is unsure of when panic attacks started but they occurred 1-2x per week for a period of about 2-3 months then then they reduced in frequency to approx once per month or less  Sxs were severe anxiety, SOB, chest tightness, tachycardia, feeling of fear, and body shaking  He would run outside to get air      He first saw a psychotherapist in approx 2014---Radha at "[facility]" who actually was his girlfriend Marie's therapist  Radha Roberts was also depressed at that time  He was given his own therapist there (but cannot recall the name)  He saw that therapist (who was female) for 1 year before she left the practice)  He was formally diagnosed with depression  He was then assigned a new therapist Mel Sellers at the same facility and f/u with her for 6 months       He first developed developed psychotic Sxs in 2015 (would think he saw saw people out of the corner of his eye)   He denies any h/o auditory or tactile hallucinations       Pt was first seen by a psychiatrist during his one and only psychiatric hospitalization in approx 2014 --for depression with SI with plan (but no attempt) to drive his truck into a brick wall, as well as visual hallucination (described above) and HI toward a father in law and brother in law  He was started on Lithium       The Lithium dose was too high per Pt and when he f/u with psychiatrist Dr Osmin Muñoz in the outpatient setting, she stopped the Lithium and gave Cymbalta and Clonazepam, and also another medicine (the name he cannot recall)  Dr Osmin Muñoz formally diagnosed bipolar disorder Per Pt--based on the mood Sxs Hx he described above      He followed Dr Osmin Muñoz for approx 1 year until insurance changed, then was without medicines or any psychiatric f/u for about 1 year  He was then referred to Shanelle Rudd by a  who was helping him get financial assistance for DM medications/care  Pt admitted to the  that his mood and anxiety Sxs were worsening       Arrested once at approx 19y/o for possession of stolen property   He was in [facility] for 45 days      Pt denies any h/o self harm behaviors, violent behaviors toward others, ECT, or  Hx     Pt tried: Cymbalta, Lithium (SE), Clonazepam          Traumatic History:      Abuse: none  Other Traumatic Events: none                                                          ] "    Past Medical History:    Past Medical History:   Diagnosis Date    ADHD, adult residual type     Cataract     Left eye    Depression     Diabetes mellitus ([facility] [address] )     blood sugar 167 on admission    Equinus deformity of foot     GERD (gastroesophageal reflux disease)     Homicidal ideations     Hyperlipidemia     Hypertension     Microalbuminuria     Morbid obesity ([address] )     Neuropathy     Sleep apnea     CPAP at bedtime    Suicidal intent        Substance Abuse History:    Social History     Substance and Sexual Activity   Alcohol Use Yes    Frequency: Monthly or less    Drinks per session: 1 or 2    Binge frequency: Never    Comment: rarely     Social History     Substance and Sexual Activity   Drug Use Never    Comment: quit 20 years ago       Social History:    Social History Socioeconomic History    Marital status: /Civil Union     Spouse name: Not on file    Number of children: 1    Years of education: Not on file    Highest education level: Not on file   Occupational History    Occupation: On disability   Social Needs    Financial resource strain: Not hard at all   Audra-Edna insecurity:     Worry: Never true     Inability: Never true   Celiro needs:     Medical: No     Non-medical: No   Tobacco Use    Smoking status: Former Smoker     Types: Cigarettes     Last attempt to quit: 1985     Years since quittin 0    Smokeless tobacco: Former User     Types: Chew    Tobacco comment: pt "Quit after approx over 25 years ago"   Substance and Sexual Activity    Alcohol use: Yes     Frequency: Monthly or less     Drinks per session: 1 or 2     Binge frequency: Never     Comment: rarely    Drug use: Never     Comment: quit 20 years ago    Sexual activity: Yes     Partners: Female     Birth control/protection: None     Comment: steady girlfriend   Lifestyle    Physical activity:     Days per week: 0 days     Minutes per session: 0 min    Stress: Not at all   Relationships    Social connections:     Talks on phone: Twice a week     Gets together: Never     Attends Restorationist service: Never     Active member of club or organization: No     Attends meetings of clubs or organizations: Never     Relationship status: Living with partner    Intimate partner violence:     Fear of current or ex partner: Patient refused     Emotionally abused: Patient refused     Physically abused: Patient refused     Forced sexual activity: Patient refused   Other Topics Concern    Not on file   Social History Narrative     from spouse, technically still  but living with other partner, partner's father, and early 2019, his partner's daughter and boyfriend moved in with their two children  Then the boyfriend moved out in 2019            Children: 1 stepdaughter Education:    Pt denies any hx of learning disabilities and reached childhood milestones on time as far as he knows  He wore braces for equinovarus but states he did not suffer delay in walking    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 because "I was just lazy, didn't do the work "    No college    Took some core classes in [employer] and worked as a volunteer  from approx [id number]             Substance Abuse History:     Pt drinks ETOH approx q 3-4 months but denies any h/o ETOH abuse  Pt tried smoking Crack once in the past and has been smoking THC once q 2-3 months since 12y/o  He denies other drug use, IVDA, addictions or drug abuse  Family Psychiatric History:     Family History   Problem Relation Age of Onset    Diabetes Mother     Hypertension Mother     Alcohol abuse Father     Diabetes Father     Hypertension Father     Alcohol abuse Sister     Depression Sister     Alcohol abuse Family        History Review: The following portions of the patient's history were reviewed and updated as appropriate: allergies, current medications, past family history, past medical history, past social history, past surgical history and problem list          OBJECTIVE:     Mental Status Evaluation:    Appearance Casually dressed, good eye contact and hygiene, obese habitus   Behavior Calm, cooperative, pleasant   Speech Clear, normal rate and volume   Mood Depression--moreso grief   Affect Normal range and intensity   Thought Processes Organized, goal directed, but lax on acting on certain things       Associations intact associations   Thought Content No delusions   Perceptual Disturbances: Pt denies any form of hallucinations and does not appear to be responding to internal stimuli   Abnormal Thoughts  Risk Potential Suicidal ideation - None  Homicidal ideation - None  Potential for aggression - No   Orientation oriented to person, place, situation, day of week, date, month of year and year   Memory recent memory grossly intact   Cosciousness alert and awake   Attention Span attention span and concentration are age appropriate   Intellect appears to be of average intelligence   Insight fair   Judgement good   Muscle Strength and  Gait normal gait and normal balance   Language no difficulty naming common objects, no difficulty repeating a phrase   Fund of Knowledge adequate knowledge of current events  adequate fund of knowledge regarding past history  adequate fund of knowledge regarding vocabulary    Pain none   Pain Scale 0       Laboratory Results:   I have personally reviewed all pertinent laboratory/tests results  Most Recent Labs:   Lab Results   Component Value Date    WBC 9 07 12/06/2019    RBC 4 38 12/06/2019    HGB 11 1 (L) 12/06/2019    HCT 33 6 (L) 12/06/2019     12/06/2019    RDW 14 6 12/06/2019    NEUTROABS 3 80 10/02/2018    SODIUM 136 12/06/2019    K 4 5 12/06/2019     12/06/2019    CO2 30 12/06/2019    BUN 12 12/06/2019    CREATININE 1 83 (H) 12/06/2019    GLUC 147 (H) 08/02/2019    GLUF 173 (H) 12/06/2019    CALCIUM 9 1 12/06/2019    AST 8 10/30/2019    ALT 12 10/30/2019    ALKPHOS 106 10/30/2019    TP 7 5 10/30/2019    ALB 2 8 (L) 10/30/2019    TBILI 0 30 10/30/2019    CHOLESTEROL 100 03/04/2019    HDL 34 (L) 03/04/2019    TRIG 191 (H) 03/04/2019    LDLCALC 28 03/04/2019    VALPROICTOT <10 (L) 09/09/2014    LITHIUM <0 2 (L) 12/15/2015    TNY9LBJGKBUQ 5 390 (H) 11/07/2017    RPR Non-Reactive 11/07/2017    HGBA1C 8 5 (H) 10/30/2019     10/30/2019       Assessment/plan:       Diagnoses and all orders for this visit:    Generalized anxiety disorder  -     busPIRone (BUSPAR) 10 mg tablet; Take 1 tablet (10 mg total) by mouth 2 (two) times a day    Bipolar disorder, current episode mixed, mild (HCC)  -     lurasidone (LATUDA) 120 mg tablet; Take 1 tablet (120 mg total) by mouth daily with breakfast  -     buPROPion (WELLBUTRIN) 100 mg tablet;  Take 1 tablet (100 mg total) by mouth daily    Bereavement    Panic attacks  -     clonazePAM (KlonoPIN) 1 mg tablet; Take 1 tablet (1 mg total) by mouth daily as needed for anxiety (or panic attacks)    Insomnia, unspecified type  -     traZODone (DESYREL) 50 mg tablet; Take 1 tablet (50 mg total) by mouth daily at bedtime as needed for sleep        PLAN:  Pt is having some mild to moderate grief related sadness and depression but denies present anxiety  I encouraged healthy eating, good hydration, exercise to the extent allowable by PMD   Pt declines referral to dietitian  I will continue the present medication regimen and Pt accepts the plan  Continue:   Latuda 120mg (1) tab po qd with dinner # 90  Bupropion 100mg (1) tab po qAM # 90  Buspirone 10mg (1) tab po bid # 180  Trazodone 50mg (1) tab po qhs prn insomnia # 90  Clonazepam 1mg (1) tab po qd prn anxiety or panic attacks # 30 R1  Gabapentin 300mg bid for DM neuropathy--Per PMD  Continue psychotherapy  Return 8 weeks, call sooner prn           Risks/Benefits      Risks, Benefits And Possible Side Effects Of Medications:    Risks, benefits, and possible side effects of medications explained to Arnold and he verbalizes understanding and agreement for treatment  Controlled Medication Discussion:     Sangeeta has been filling controlled prescriptions on time as prescribed according to [facility]  Discussed with Sangeeta the risks of sedation, respiratory depression, impairment of ability to drive and potential for abuse and addiction related to treatment with benzodiazepine medications   He understands risk of treatment with benzodiazepine medications, agrees to not drive if feels impaired and agrees to take medications as prescribed

## 2020-01-17 ENCOUNTER — OFFICE VISIT (OUTPATIENT)
Dept: PSYCHIATRY | Facility: CLINIC | Age: 55
End: 2020-01-17
Payer: MEDICARE

## 2020-01-17 VITALS — WEIGHT: 315 LBS | BODY MASS INDEX: 50.62 KG/M2 | HEIGHT: 66 IN

## 2020-01-17 DIAGNOSIS — F41.0 PANIC ATTACKS: ICD-10-CM

## 2020-01-17 DIAGNOSIS — F31.61 BIPOLAR DISORDER, CURRENT EPISODE MIXED, MILD (HCC): ICD-10-CM

## 2020-01-17 DIAGNOSIS — F41.1 GENERALIZED ANXIETY DISORDER: Primary | Chronic | ICD-10-CM

## 2020-01-17 DIAGNOSIS — G47.00 INSOMNIA, UNSPECIFIED TYPE: ICD-10-CM

## 2020-01-17 DIAGNOSIS — Z63.4 BEREAVEMENT: ICD-10-CM

## 2020-01-17 PROCEDURE — 99214 OFFICE O/P EST MOD 30 MIN: CPT | Performed by: PHYSICIAN ASSISTANT

## 2020-01-17 RX ORDER — TRAZODONE HYDROCHLORIDE 50 MG/1
50 TABLET ORAL
Qty: 90 TABLET | Refills: 0 | Status: SHIPPED | OUTPATIENT
Start: 2020-01-17 | End: 2020-04-02 | Stop reason: SDUPTHER

## 2020-01-17 RX ORDER — BUSPIRONE HYDROCHLORIDE 10 MG/1
10 TABLET ORAL 2 TIMES DAILY
Qty: 180 TABLET | Refills: 0 | Status: SHIPPED | OUTPATIENT
Start: 2020-01-17 | End: 2020-04-02 | Stop reason: SDUPTHER

## 2020-01-17 RX ORDER — CLONAZEPAM 1 MG/1
1 TABLET ORAL DAILY PRN
Qty: 30 TABLET | Refills: 1 | Status: SHIPPED | OUTPATIENT
Start: 2020-01-17 | End: 2020-04-02 | Stop reason: SDUPTHER

## 2020-01-17 RX ORDER — BUPROPION HYDROCHLORIDE 100 MG/1
100 TABLET ORAL DAILY
Qty: 90 TABLET | Refills: 0 | Status: SHIPPED | OUTPATIENT
Start: 2020-01-17 | End: 2020-04-02 | Stop reason: SDUPTHER

## 2020-01-17 SDOH — SOCIAL STABILITY - SOCIAL INSECURITY: DISSAPEARANCE AND DEATH OF FAMILY MEMBER: Z63.4

## 2020-01-25 DIAGNOSIS — E11.42 DIABETIC POLYNEUROPATHY ASSOCIATED WITH TYPE 2 DIABETES MELLITUS (HCC): ICD-10-CM

## 2020-01-25 DIAGNOSIS — E11.65 TYPE 2 DIABETES MELLITUS WITH HYPERGLYCEMIA, UNSPECIFIED WHETHER LONG TERM INSULIN USE (HCC): ICD-10-CM

## 2020-01-25 DIAGNOSIS — I10 BENIGN ESSENTIAL HYPERTENSION: ICD-10-CM

## 2020-01-27 RX ORDER — AMLODIPINE BESYLATE 5 MG/1
TABLET ORAL
Qty: 90 TABLET | Refills: 1 | Status: SHIPPED | OUTPATIENT
Start: 2020-01-27 | End: 2020-07-06

## 2020-01-27 RX ORDER — LISINOPRIL AND HYDROCHLOROTHIAZIDE 25; 20 MG/1; MG/1
TABLET ORAL
Qty: 90 TABLET | Refills: 1 | Status: SHIPPED | OUTPATIENT
Start: 2020-01-27 | End: 2020-07-06

## 2020-01-27 RX ORDER — GABAPENTIN 300 MG/1
CAPSULE ORAL
Qty: 180 CAPSULE | Refills: 1 | Status: SHIPPED | OUTPATIENT
Start: 2020-01-27 | End: 2020-07-06

## 2020-01-27 RX ORDER — ATORVASTATIN CALCIUM 10 MG/1
TABLET, FILM COATED ORAL
Qty: 90 TABLET | Refills: 1 | Status: SHIPPED | OUTPATIENT
Start: 2020-01-27 | End: 2020-07-06

## 2020-01-31 DIAGNOSIS — K21.00 GASTROESOPHAGEAL REFLUX DISEASE WITH ESOPHAGITIS: ICD-10-CM

## 2020-01-31 DIAGNOSIS — K44.9 HIATAL HERNIA: ICD-10-CM

## 2020-01-31 DIAGNOSIS — K22.2 LOWER ESOPHAGEAL RING (SCHATZKI): ICD-10-CM

## 2020-01-31 RX ORDER — OMEPRAZOLE 20 MG/1
CAPSULE, DELAYED RELEASE ORAL
Qty: 90 CAPSULE | Refills: 1 | Status: SHIPPED | OUTPATIENT
Start: 2020-01-31 | End: 2020-07-06

## 2020-02-04 ENCOUNTER — OFFICE VISIT (OUTPATIENT)
Dept: MULTI SPECIALTY CLINIC | Facility: CLINIC | Age: 55
End: 2020-02-04

## 2020-02-04 VITALS
OXYGEN SATURATION: 97 % | HEART RATE: 90 BPM | HEIGHT: 66 IN | DIASTOLIC BLOOD PRESSURE: 78 MMHG | WEIGHT: 315 LBS | SYSTOLIC BLOOD PRESSURE: 114 MMHG | BODY MASS INDEX: 50.62 KG/M2 | TEMPERATURE: 97.9 F

## 2020-02-04 DIAGNOSIS — B35.1 ONYCHOMYCOSIS: ICD-10-CM

## 2020-02-04 DIAGNOSIS — E11.65 TYPE 2 DIABETES MELLITUS WITH HYPERGLYCEMIA, WITH LONG-TERM CURRENT USE OF INSULIN (HCC): ICD-10-CM

## 2020-02-04 DIAGNOSIS — Z79.4 TYPE 2 DIABETES MELLITUS WITH HYPERGLYCEMIA, WITH LONG-TERM CURRENT USE OF INSULIN (HCC): ICD-10-CM

## 2020-02-04 PROCEDURE — 1036F TOBACCO NON-USER: CPT | Performed by: PODIATRIST

## 2020-02-04 PROCEDURE — 3074F SYST BP LT 130 MM HG: CPT | Performed by: PODIATRIST

## 2020-02-04 PROCEDURE — 3008F BODY MASS INDEX DOCD: CPT | Performed by: PODIATRIST

## 2020-02-04 PROCEDURE — 11721 DEBRIDE NAIL 6 OR MORE: CPT | Performed by: PODIATRIST

## 2020-02-04 PROCEDURE — 99212 OFFICE O/P EST SF 10 MIN: CPT | Performed by: PODIATRIST

## 2020-02-04 PROCEDURE — 3078F DIAST BP <80 MM HG: CPT | Performed by: PODIATRIST

## 2020-02-04 RX ORDER — INSULIN LISPRO 100 [IU]/ML
INJECTION, SOLUTION INTRAVENOUS; SUBCUTANEOUS
COMMUNITY
Start: 2019-12-31 | End: 2020-02-11

## 2020-02-04 NOTE — PROGRESS NOTES
Assessment/Plan:       Diagnoses and all orders for this visit:    Type 2 diabetes mellitus with hyperglycemia, with long-term current use of insulin ([facility] [address] )  -     Ambulatory referral to Podiatry    Onychomycosis    - Debrided x10 toenails with large nail nipper without any incidents  - Educated patient of proper diabetic foot care, shoe gear and importance of glycemic control   - recommended to apply OTC lotion to b/l lower extremity for dry skin   - RTC in 6 months       Nail removal  Date/Time: 2/4/2020 11:17 AM  Performed by: Marybeth Jo DPM  Authorized by: Marybeth Jo DPM     Patient location:  Clinic  Nails trimmed:     Number of nails trimmed:  10        Subjective:      Patient ID: Charito Ruano is a 49 y o  male  Presents for diabetic foot rist assessment evaluation  Patient states he wants to have his nail trimmed today  He states he does not know his blood sugar level as he does not check daily  Denies any other complaints  HPI    The following portions of the patient's history were reviewed and updated as appropriate: allergies, current medications, past family history, past medical history, past social history, past surgical history and problem list     Review of Systems   Constitutional: Negative  HENT: Negative  Respiratory: Negative  Cardiovascular: Negative  Musculoskeletal: Negative  Neurological: Negative  Psychiatric/Behavioral: Negative  Objective:      /78 (BP Location: Right arm, Patient Position: Sitting, Cuff Size: Adult)   Pulse 90   Temp 97 9 °F (36 6 °C) (Oral)   Ht 5' 6" (1 676 m)   Wt (!) 155 kg (341 lb 4 4 oz)   SpO2 97%   BMI 55 08 kg/m²          Physical Exam   Cardiovascular:   Pulses:       Dorsalis pedis pulses are 1+ on the right side, and 1+ on the left side  Posterior tibial pulses are 1+ on the right side, and 1+ on the left side  Musculoskeletal:   B/l equinus deformity noted  Pes planus deformity b/l      Feet:   Right Foot:   Protective Sensation: 10 sites tested  3 sites sensed  Skin Integrity: Positive for dry skin  Left Foot:   Protective Sensation: 10 sites tested  3 sites sensed  Skin Integrity: Positive for dry skin  Skin:   Elongated, discolored and thickended toenails with subungual debris noted  Moderately xerotic feet b/l  No open lesions noted

## 2020-02-05 ENCOUNTER — APPOINTMENT (OUTPATIENT)
Dept: LAB | Facility: CLINIC | Age: 55
End: 2020-02-05
Payer: MEDICARE

## 2020-02-05 DIAGNOSIS — Z79.4 TYPE 2 DIABETES MELLITUS WITH HYPERGLYCEMIA, WITH LONG-TERM CURRENT USE OF INSULIN (HCC): ICD-10-CM

## 2020-02-05 DIAGNOSIS — E11.65 TYPE 2 DIABETES MELLITUS WITH HYPERGLYCEMIA, WITH LONG-TERM CURRENT USE OF INSULIN (HCC): ICD-10-CM

## 2020-02-05 LAB
ALBUMIN SERPL BCP-MCNC: 2.9 G/DL (ref 3.5–5)
ALP SERPL-CCNC: 101 U/L (ref 46–116)
ALT SERPL W P-5'-P-CCNC: 14 U/L (ref 12–78)
ANION GAP SERPL CALCULATED.3IONS-SCNC: 10 MMOL/L (ref 4–13)
AST SERPL W P-5'-P-CCNC: 13 U/L (ref 5–45)
BILIRUB SERPL-MCNC: 0.39 MG/DL (ref 0.2–1)
BUN SERPL-MCNC: 20 MG/DL (ref 5–25)
CALCIUM SERPL-MCNC: 9.2 MG/DL (ref 8.3–10.1)
CHLORIDE SERPL-SCNC: 104 MMOL/L (ref 100–108)
CO2 SERPL-SCNC: 27 MMOL/L (ref 21–32)
CREAT SERPL-MCNC: 1.79 MG/DL (ref 0.6–1.3)
EST. AVERAGE GLUCOSE BLD GHB EST-MCNC: 243 MG/DL
GFR SERPL CREATININE-BSD FRML MDRD: 49 ML/MIN/1.73SQ M
GLUCOSE P FAST SERPL-MCNC: 115 MG/DL (ref 65–99)
HBA1C MFR BLD: 10.1 % (ref 4.2–6.3)
POTASSIUM SERPL-SCNC: 4.3 MMOL/L (ref 3.5–5.3)
PROT SERPL-MCNC: 7.3 G/DL (ref 6.4–8.2)
SODIUM SERPL-SCNC: 141 MMOL/L (ref 136–145)

## 2020-02-05 PROCEDURE — 36415 COLL VENOUS BLD VENIPUNCTURE: CPT

## 2020-02-05 PROCEDURE — 80053 COMPREHEN METABOLIC PANEL: CPT

## 2020-02-05 PROCEDURE — 83036 HEMOGLOBIN GLYCOSYLATED A1C: CPT

## 2020-02-06 ENCOUNTER — SOCIAL WORK (OUTPATIENT)
Dept: BEHAVIORAL/MENTAL HEALTH CLINIC | Facility: CLINIC | Age: 55
End: 2020-02-06
Payer: MEDICARE

## 2020-02-06 DIAGNOSIS — G47.00 INSOMNIA, UNSPECIFIED TYPE: ICD-10-CM

## 2020-02-06 DIAGNOSIS — F41.0 PANIC ATTACKS: ICD-10-CM

## 2020-02-06 DIAGNOSIS — F41.1 GENERALIZED ANXIETY DISORDER: ICD-10-CM

## 2020-02-06 DIAGNOSIS — F31.61 BIPOLAR DISORDER, CURRENT EPISODE MIXED, MILD (HCC): Primary | ICD-10-CM

## 2020-02-06 DIAGNOSIS — Z63.4 BEREAVEMENT: ICD-10-CM

## 2020-02-06 PROCEDURE — 90834 PSYTX W PT 45 MINUTES: CPT | Performed by: SOCIAL WORKER

## 2020-02-06 SDOH — SOCIAL STABILITY - SOCIAL INSECURITY: DISSAPEARANCE AND DEATH OF FAMILY MEMBER: Z63.4

## 2020-02-06 NOTE — PSYCH
Psychotherapy Provided: Individual Psychotherapy 45 minutes     Length of time in session: 45 minutes, follow up in 2 week    Goals addressed in session: Goal 1, Goal 2 and Goal 3      Pain:      moderate to severe    0    Current suicide risk : Low     Data: Naila Hudson discussed how his great niece who lives with him is dealing with depression, anger, and aggression  She is 10 but lost her mom at age 3 and is still not dealing well  She sees Dr Nabil Harding and a therapist at [facility] [place]  Naila Hudson claims he is doing well emotionally and is doing more family things  He talked a great deal of the issues they are having with his great niece  He and his partner are doing much better  He discussed his goals of how he is trying to take better care of his health, how he believes he is coping and dealing more effectively with difficult people and how he continues to use the strategies we discuss to help him with his depression  Assessment: Naila Hudson has not been here for quite awhile and he wanted/needed to talk about the progress on his current goals  We discussed further coping strategies  Plan: We will update his goals next session  Behavioral Health Treatment Plan [facility]: Diagnosis and Treatment Plan explained to Amarilis Banks relates understanding diagnosis and is agreeable to Treatment Plan   Yes

## 2020-02-06 NOTE — PSYCH
Treatment Plan Tracking    # updatedTreatment Plan not completed within required time limits due to it was due 1/04  However Orlando's last session was 12/02 and today he wanted to talk about his great niece and her issues  He is raising her  He also wanted to talk about the progress on her current goals  We will update it next session   :

## 2020-02-07 ENCOUNTER — OFFICE VISIT (OUTPATIENT)
Dept: INTERNAL MEDICINE CLINIC | Facility: CLINIC | Age: 55
End: 2020-02-07

## 2020-02-07 VITALS
HEART RATE: 89 BPM | WEIGHT: 315 LBS | OXYGEN SATURATION: 98 % | SYSTOLIC BLOOD PRESSURE: 118 MMHG | BODY MASS INDEX: 50.62 KG/M2 | TEMPERATURE: 98 F | DIASTOLIC BLOOD PRESSURE: 80 MMHG | HEIGHT: 66 IN

## 2020-02-07 DIAGNOSIS — Z11.3 ROUTINE SCREENING FOR STI (SEXUALLY TRANSMITTED INFECTION): ICD-10-CM

## 2020-02-07 DIAGNOSIS — E11.65 TYPE 2 DIABETES MELLITUS WITH HYPERGLYCEMIA, WITH LONG-TERM CURRENT USE OF INSULIN (HCC): Primary | ICD-10-CM

## 2020-02-07 DIAGNOSIS — E66.01 MORBID OBESITY WITH BMI OF 50.0-59.9, ADULT (HCC): ICD-10-CM

## 2020-02-07 DIAGNOSIS — Z79.4 TYPE 2 DIABETES MELLITUS WITH HYPERGLYCEMIA, WITH LONG-TERM CURRENT USE OF INSULIN (HCC): Primary | ICD-10-CM

## 2020-02-07 DIAGNOSIS — I12.9 BENIGN HYPERTENSION WITH CKD (CHRONIC KIDNEY DISEASE) STAGE III (HCC): ICD-10-CM

## 2020-02-07 DIAGNOSIS — F31.61 BIPOLAR DISORDER, CURRENT EPISODE MIXED, MILD (HCC): ICD-10-CM

## 2020-02-07 DIAGNOSIS — N18.30 BENIGN HYPERTENSION WITH CKD (CHRONIC KIDNEY DISEASE) STAGE III (HCC): ICD-10-CM

## 2020-02-07 PROBLEM — J06.9 VIRAL UPPER RESPIRATORY TRACT INFECTION: Status: RESOLVED | Noted: 2019-11-01 | Resolved: 2020-02-07

## 2020-02-07 PROBLEM — R79.89 ELEVATED SERUM CREATININE: Status: RESOLVED | Noted: 2019-04-17 | Resolved: 2020-02-07

## 2020-02-07 PROCEDURE — 3008F BODY MASS INDEX DOCD: CPT | Performed by: PHYSICIAN ASSISTANT

## 2020-02-07 PROCEDURE — 3079F DIAST BP 80-89 MM HG: CPT | Performed by: PHYSICIAN ASSISTANT

## 2020-02-07 PROCEDURE — 1036F TOBACCO NON-USER: CPT | Performed by: PHYSICIAN ASSISTANT

## 2020-02-07 PROCEDURE — 3074F SYST BP LT 130 MM HG: CPT | Performed by: PHYSICIAN ASSISTANT

## 2020-02-07 PROCEDURE — 99213 OFFICE O/P EST LOW 20 MIN: CPT | Performed by: PHYSICIAN ASSISTANT

## 2020-02-07 NOTE — PATIENT INSTRUCTIONS
As per our discussion today you are aware that your sugar significantly increased once again  You admit that you will eat healthier for a little while then just get tired of it and go back to your usual eating pattern  As reviewed you have an appointment scheduled with the endocrinologist next month  As we discussed please make sure to tell them all of your eating habits including eating out frequently so they can better advise you on adjustments in medications  You have labs that are due for the nephrologist/kidney doctor 1st week of May they will then contact you to schedule the follow-up  Appointment for follow-up with the podiatrist also scheduled in May  I provided you with a script to get your labs completed for your cholesterol levels  Please get this completed prior to your appointment with the endocrinologist next month  Schedule follow-up with me in 6 months  Continue follow-up with your mental health providers and medications as scheduled

## 2020-02-07 NOTE — PROGRESS NOTES
Assessment/Plan:  As per our discussion today you are aware that your sugar significantly increased once again  You admit that you will eat healthier for a little while then just get tired of it and go back to your usual eating pattern  As reviewed you have an appointment scheduled with the endocrinologist next month  As we discussed please make sure to tell them all of your eating habits including eating out frequently so they can better advise you on adjustments in medications  You have labs that are due for the nephrologist/kidney doctor 1st week of May they will then contact you to schedule the follow-up  Appointment for follow-up with the podiatrist also scheduled in May  I provided you with a script to get your labs completed for your cholesterol levels  Please get this completed prior to your appointment with the endocrinologist next month  Schedule follow-up with me in 6 months  Continue follow-up with your mental health providers and medications as scheduled  No problem-specific Assessment & Plan notes found for this encounter  Diagnoses and all orders for this visit:    Type 2 diabetes mellitus with hyperglycemia, with long-term current use of insulin ([facility])  -     Comprehensive metabolic panel  -     Lipid Panel with Direct LDL reflex    Benign hypertension with CKD (chronic kidney disease) stage III ([facility])    Morbid obesity with BMI of 50 0-59 9, adult ([facility])    Routine screening for STI (sexually transmitted infection)  -     HIV 1/2 AG-AB combo; Future    Bipolar disorder, current episode mixed, mild ([facility])          Subjective:      Patient ID: Marleni Sellers is a 49 y o  male  Patient presents today for follow-up lab review of chronic medical conditions  Patient does have a significant number of comorbidities  Patient has been an uncontrolled diabetic for many years    Last labs showed some improvement with A1c of 8 5% however at this time has significantly increased to 10 1%    Patient also has CKD stage 3 patient saw the nephrologist in December has labs and follow-up scheduled in 6 months  Patient also has underlying bipolar disorder anxiety and depression and continues to follow with mental health providers    Have struggled with patient for significant number of years on compliance and treatment for his diabetes  Patient as always on visits reports he will continue to eat the way he wants to and aware the affects this is having on his body his health  Patient does have an upcoming appointment next month with endocrinology and I am hopeful that they will be able to convince him to get better control  Patient admits that for a little while he will be better but then just gets tired of it and will go back to his regular routine  Patient also has obstructive sleep apnea and scheduled for follow-up with Sleep Medicine in September  Lantus 50 units twice a day    Humalog is supposed to take 32 units 3X a day unless sugar below 150  But admits eats out a lot and then not have his insulin and not take with him  Patient states that his wife does not cook on a daily basis so they frequently eat out and not in healthy locations  Patient has declined referral for weight management and does not wish to pursue today  The following portions of the patient's history were reviewed and updated as appropriate: allergies, current medications, past family history, past medical history, past social history, past surgical history and problem list     Review of Systems   Constitutional: Negative  Negative for activity change, appetite change, chills and fever  Eyes: Negative  Reading glasses   Respiratory: Negative  Negative for cough and shortness of breath  Cardiovascular: Negative  Negative for chest pain and palpitations  Gastrointestinal: Negative  Negative for abdominal pain, constipation and diarrhea     Endocrine: Positive for polydipsia and polyphagia  Negative for cold intolerance, heat intolerance and polyuria  Musculoskeletal: Positive for arthralgias (knees unchanged declines eval)  Skin: Negative  Negative for rash  Neurological: Negative for dizziness, light-headedness and headaches  Psychiatric/Behavioral: Positive for dysphoric mood  Negative for decreased concentration, self-injury and suicidal ideas  The patient is not nervous/anxious  Objective:      /80 (BP Location: Left arm, Patient Position: Sitting, Cuff Size: Large)   Pulse 89   Temp 98 °F (36 7 °C) (Oral)   Ht 5' 6" (1 676 m)   Wt (!) 154 kg (339 lb 8 1 oz)   SpO2 98%   BMI 54 80 kg/m²          Physical Exam   Constitutional: He is oriented to person, place, and time  He appears well-developed and well-nourished  No distress  HENT:   Head: Atraumatic  Mouth/Throat: Oropharynx is clear and moist    Eyes: Pupils are equal, round, and reactive to light  Conjunctivae are normal    Neck: No JVD present  No thyromegaly present  Cardiovascular: Normal rate, regular rhythm and normal heart sounds  No murmur heard  Pulmonary/Chest: Effort normal and breath sounds normal  He has no wheezes  Abdominal: Soft  Bowel sounds are normal  There is no tenderness  There is no guarding  Exam limited by body habitus   Musculoskeletal: He exhibits edema (Mild 1+ nonpitting dependent edema)  Neurological: He is alert and oriented to person, place, and time  Skin: No rash noted  Psychiatric: His behavior is normal  Thought content normal    Patient does continue to follow with mental health providers for bipolar disorder      Appropriate affect but slightly flat

## 2020-02-11 ENCOUNTER — CONSULT (OUTPATIENT)
Dept: MULTI SPECIALTY CLINIC | Facility: CLINIC | Age: 55
End: 2020-02-11

## 2020-02-11 VITALS
OXYGEN SATURATION: 98 % | SYSTOLIC BLOOD PRESSURE: 138 MMHG | HEIGHT: 66 IN | DIASTOLIC BLOOD PRESSURE: 72 MMHG | BODY MASS INDEX: 50.62 KG/M2 | HEART RATE: 89 BPM | WEIGHT: 315 LBS | TEMPERATURE: 97.8 F

## 2020-02-11 DIAGNOSIS — Z79.4 TYPE 2 DIABETES MELLITUS WITH HYPERGLYCEMIA, WITH LONG-TERM CURRENT USE OF INSULIN (HCC): Primary | ICD-10-CM

## 2020-02-11 DIAGNOSIS — E78.2 MIXED HYPERLIPIDEMIA: ICD-10-CM

## 2020-02-11 DIAGNOSIS — E11.65 TYPE 2 DIABETES MELLITUS WITH HYPERGLYCEMIA, UNSPECIFIED WHETHER LONG TERM INSULIN USE (HCC): ICD-10-CM

## 2020-02-11 DIAGNOSIS — I12.9 BENIGN HYPERTENSION WITH CKD (CHRONIC KIDNEY DISEASE) STAGE III (HCC): ICD-10-CM

## 2020-02-11 DIAGNOSIS — E11.65 TYPE 2 DIABETES MELLITUS WITH HYPERGLYCEMIA, WITH LONG-TERM CURRENT USE OF INSULIN (HCC): Primary | ICD-10-CM

## 2020-02-11 DIAGNOSIS — N18.30 BENIGN HYPERTENSION WITH CKD (CHRONIC KIDNEY DISEASE) STAGE III (HCC): ICD-10-CM

## 2020-02-11 PROBLEM — E78.5 HYPERLIPIDEMIA: Status: ACTIVE | Noted: 2020-02-11

## 2020-02-11 PROCEDURE — 99214 OFFICE O/P EST MOD 30 MIN: CPT | Performed by: PHYSICIAN ASSISTANT

## 2020-02-11 RX ORDER — INSULIN LISPRO 100 [IU]/ML
28 INJECTION, SOLUTION INTRAVENOUS; SUBCUTANEOUS
Qty: 5 PEN
Start: 2020-02-11 | End: 2020-08-10

## 2020-02-11 NOTE — PROGRESS NOTES
Patient Progress Note      Chief Complaint   Patient presents with    Consult     dm2      Referring Provider  Charissa Green Pa-c  [address], [address] [facility]     History of Present Illness:   Quynh Karimi is a 49 y o  male with a history of type 2 diabetes with long term use of insulin  Has had DM for 15+ years  Diabetes course has been stable  Complications of DM: CKD, neuropathy  Denies recent illness or hospitalizations  Denies recent severe hypoglycemic or severe hyperglycemic episodes  Denies any issues with his current regimen  Home glucose monitoring: are performed sporadically, once every 2-3 days  Home blood glucose readings: 100s-HI, most reading over 200s mg/dl  BG dropped to 60s in January 2020    Current regimen:  Lantus 50 units twice a day, Humalog 32 units 3 times a day with meals  noncompliant some of the time, denies any side effects from medications  Is missing dose of Lantus 2-3 times a weeks and Humalog most days he missing  He taking Humalog 32 units once or twice a week if he feels high  He does report when he has taken his insulin as instructed without missing doses, his BG levels have dropped to 70s mg/dl  He skips doses because "I"m too lazy to take it "  Injects in: abdomen  Rotates sites: Yes  Hypoglycemic episodes: No, rare  H/o of hypoglycemia causing hospitalization or Intervention such as glucagon injection  or ambulance call :  No  Hypoglycemia symptoms: jitteriness  Treatment of hypoglycemia: sugary food/beverage     Medic alert tag: recommended: Yes    Diabetes education: No  Diet: 2 meals per day, 0-1 snacks per day  Timing of meals is predictable  Diabetic diet compliance:  noncompliant much of the time  Activity: Daily activity is predictable: Yes  No routine exercise       Further diabetic ROS: no polyuria or polydipsia, no chest pain, dyspnea    Ophthamology: eye exam UTD, November 2019  Podiatry: foot exam UTD, November 2019    Has hypertension: on ACE inhibitor/ARB, compliant most of the time  Has hyperlipidemia: on statin - tolerating well, no myalgias  compliant most of the time, denies any side effects from medications  Thyroid disorders: No  History of pancreatitis: No    Patient Active Problem List   Diagnosis    Bipolar affective disorder, current episode mixed ([facility] )    Panic attacks    Morbid obesity with BMI of 50 0-59 9, adult ([facility] )    Flat foot    Diabetic polyneuropathy ([facility] )    Benign hypertension with CKD (chronic kidney disease) stage III ([facility])    Allergic rhinitis    Insomnia    Hiatal hernia    Lower esophageal ring ([place])    Generalized anxiety disorder    DAISY (obstructive sleep apnea)    Stage 3 chronic kidney disease ([facility])    Type 2 diabetes mellitus with hyperglycemia, with long-term current use of insulin ([facility])    Onychomycosis    Persistent proteinuria    Anemia due to stage 3 chronic kidney disease ([facility])    Toenail deformity    Bereavement    Hyperlipidemia      Past Medical History:   Diagnosis Date    ADHD, adult residual type     Cataract     Left eye    Depression     Diabetes mellitus ([facility])     blood sugar 167 on admission    Equinus deformity of foot     GERD (gastroesophageal reflux disease)     Homicidal ideations     Hyperlipidemia     Hypertension     Microalbuminuria     Morbid obesity ([facility])     Neuropathy     Sleep apnea     CPAP at bedtime    Suicidal intent       Past Surgical History:   Procedure Laterality Date    CATARACT EXTRACTION      HAND SURGERY Right     OTHER SURGICAL HISTORY      REPAIR OF SUPERFICIAL WOUND FACE    SC ESOPHAGOGASTRODUODENOSCOPY TRANSORAL DIAGNOSTIC N/A 6/14/2018    Procedure: ESOPHAGOGASTRODUODENOSCOPY (EGD); Surgeon: Lord Domingo MD;  [facility]: [facility];   Service: Gastroenterology    SC ESOPHAGOGASTRODUODENOSCOPY TRANSORAL DIAGNOSTIC N/A 9/12/2018    Procedure: EGD AND COLONOSCOPY;  Surgeon: Lord Domingo MD;  [facility]: [facility]; Service: Gastroenterology      Family History   Problem Relation Age of Onset    Diabetes Mother     Hypertension Mother     Alcohol abuse Father     Diabetes Father     Hypertension Father     Alcohol abuse Sister     Depression Sister     Alcohol abuse Family      Social History     Tobacco Use    Smoking status: Former Smoker     Types: Cigarettes     Last attempt to quit: 1985     Years since quittin 1    Smokeless tobacco: Former User     Types: Chew    Tobacco comment: pt "Quit after approx over 25 years ago"   Substance Use Topics    Alcohol use: Yes     Frequency: Monthly or less     Drinks per session: 1 or 2     Binge frequency: Never     Comment: rarely     Allergies   Allergen Reactions    Pollen Extract Nasal Congestion    Tetanus Toxoid Swelling         Current Outpatient Medications:     amLODIPine (NORVASC) 5 mg tablet, TAKE 1 TABLET(5 MG) BY MOUTH DAILY, Disp: 90 tablet, Rfl: 1    atorvastatin (LIPITOR) 10 mg tablet, TAKE 1 TABLET(10 MG) BY MOUTH DAILY, Disp: 90 tablet, Rfl: 1    busPIRone (BUSPAR) 10 mg tablet, Take 1 tablet (10 mg total) by mouth 2 (two) times a day, Disp: 180 tablet, Rfl: 0    clonazePAM (KlonoPIN) 1 mg tablet, Take 1 tablet (1 mg total) by mouth daily as needed for anxiety (or panic attacks), Disp: 30 tablet, Rfl: 1    FREESTYLE LITE test strip, TEST THREE TIMES DAILY, Disp: 300 each, Rfl: 0    gabapentin (NEURONTIN) 300 mg capsule, TAKE 1 CAPSULE(300 MG) BY MOUTH TWICE DAILY, Disp: 180 capsule, Rfl: 1    Insulin Pen Needle (B-D ULTRAFINE III SHORT PEN) 31G X 8 MM MISC, by Does not apply route, Disp: , Rfl:     INSULIN SYRINGE  5CC/29G 29G X 1/2" 0 5 ML MISC, by Does not apply route, Disp: , Rfl:     Lancets (FREESTYLE) lancets, USE THREE TIMES DAILY AS DIRECTED, Disp: 300 each, Rfl: 0    LANTUS SOLOSTAR 100 units/mL injection pen, INJECT 50 UNITS UNDER THE SKIN EVERY 12 HOURS, Disp: 5 pen, Rfl: 1    lisinopril-hydrochlorothiazide (PRINZIDE,ZESTORETIC) 20-25 MG per tablet, TAKE 1 TABLET BY MOUTH DAILY, Disp: 90 tablet, Rfl: 1    lurasidone (LATUDA) 120 mg tablet, Take 1 tablet (120 mg total) by mouth daily with breakfast, Disp: 90 tablet, Rfl: 0    omeprazole (PriLOSEC) 20 mg delayed release capsule, TAKE 1 CAPSULE(20 MG) BY MOUTH DAILY  DAYS, Disp: 90 capsule, Rfl: 1    traZODone (DESYREL) 50 mg tablet, Take 1 tablet (50 mg total) by mouth daily at bedtime as needed for sleep, Disp: 90 tablet, Rfl: 0    benzonatate (TESSALON PERLES) 100 mg capsule, Take 1 capsule (100 mg total) by mouth 3 (three) times a day as needed for cough (Patient not taking: Reported on 12/10/2019), Disp: 20 capsule, Rfl: 0    Blood Glucose Monitoring Suppl (FREESTYLE LITE) ANTONIA, by Does not apply route 3 (three) times a day (Patient not taking: Reported on 2/4/2020), Disp: 1 each, Rfl: 0    buPROPion (WELLBUTRIN) 100 mg tablet, Take 1 tablet (100 mg total) by mouth daily, Disp: 90 tablet, Rfl: 0    Dulaglutide (TRULICITY) 3 41 PE/5 1CJ SOPN, Inject 0 5 mL (0 75 mg total) under the skin once a week, Disp: 4 pen, Rfl: 1    insulin lispro (HumaLOG) 100 units/mL injection pen, Inject 28 Units under the skin 3 (three) times a day with meals, Disp: 5 pen, Rfl:     Iron-Vitamin C 100-250 MG TABS, Take 1 tablet by mouth daily (Patient not taking: Reported on 2/11/2020), Disp: 90 each, Rfl: 3  Review of Systems   Constitutional: Negative for activity change, appetite change, fatigue and unexpected weight change  HENT: Negative for trouble swallowing  Eyes: Positive for visual disturbance  Respiratory: Negative for shortness of breath  Cardiovascular: Negative for chest pain and palpitations  Gastrointestinal: Positive for diarrhea  Negative for constipation  Endocrine: Negative for polydipsia and polyuria  Musculoskeletal: Negative  Skin: Negative for wound  Neurological: Positive for numbness  Psychiatric/Behavioral: Negative  Physical Exam:  Body mass index is 55 65 kg/m²  /72 (BP Location: Right arm, Patient Position: Sitting, Cuff Size: Large)   Pulse 89   Temp 97 8 °F (36 6 °C) (Temporal)   Ht 5' 6" (1 676 m)   Wt (!) 156 kg (344 lb 12 8 oz)   SpO2 98%   BMI 55 65 kg/m²    Wt Readings from Last 3 Encounters:   02/11/20 (!) 156 kg (344 lb 12 8 oz)   02/07/20 (!) 154 kg (339 lb 8 1 oz)   02/04/20 (!) 155 kg (341 lb 4 4 oz)       Physical Exam   Constitutional: He appears well-developed and well-nourished  HENT:   Head: Normocephalic  Eyes: Pupils are equal, round, and reactive to light  EOM are normal  No scleral icterus  Neck: Neck supple  No thyromegaly present  Cardiovascular: Normal rate and regular rhythm  No murmur heard  Pulses:       Radial pulses are 2+ on the right side, and 2+ on the left side  Pulmonary/Chest: Effort normal and breath sounds normal  No respiratory distress  He has no wheezes  Neurological: He is alert  Skin: Skin is warm and dry  Psychiatric: He has a normal mood and affect  Nursing note and vitals reviewed  Patient's shoes and socks were not removed        Labs:   Component      Latest Ref Rng & Units 3/4/2019 6/22/2019 10/30/2019   Sodium      136 - 145 mmol/L 137 136 132 (L)   Potassium      3 5 - 5 3 mmol/L 4 3 4 3 4 2   Chloride      100 - 108 mmol/L 102 103 97 (L)   CO2      21 - 32 mmol/L 26 28 29   Anion Gap      4 - 13 mmol/L 9 5 6   BUN      5 - 25 mg/dL 19 16 18   Creatinine      0 60 - 1 30 mg/dL 1 62 (H) 1 62 (H) 1 68 (H)   GLUCOSE FASTING      65 - 99 mg/dL 239 (H) 262 (H) 288 (H)   Calcium      8 3 - 10 1 mg/dL 8 9 8 4 8 8   AST      5 - 45 U/L 14 14 8   ALT      12 - 78 U/L 19 18 12   Alkaline Phosphatase      46 - 116 U/L 119 (H) 109 106   Total Protein      6 4 - 8 2 g/dL 7 6 7 2 7 5   Albumin      3 5 - 5 0 g/dL 2 7 (L) 2 8 (L) 2 8 (L)   TOTAL BILIRUBIN      0 20 - 1 00 mg/dL 0 20 0 20 0 30   eGFR      ml/min/1 73sq m 56 55 53   Cholesterol      50 - 200 mg/dL 100     Triglycerides      <=150 mg/dL 191 (H)     HDL      40 - 60 mg/dL 34 (L)     LDL Direct      0 - 100 mg/dL 28     EXT Creatinine Urine      mg/dL   54 2   MICROALBUM ,U,RANDOM      0 0 - 20 0 mg/L   308 0 (H)   MICROALBUMIN/CREATININE RATIO      0 - 30 mg/g creatinine   568 (H)   Hemoglobin A1C      4 2 - 6 3 % 9 1 (H) 9 9 (H) 8 5 (H)   EAG      mg/dl 214 237 197     Component      Latest Ref Rng & Units 2/5/2020   Sodium      136 - 145 mmol/L 141   Potassium      3 5 - 5 3 mmol/L 4 3   Chloride      100 - 108 mmol/L 104   CO2      21 - 32 mmol/L 27   Anion Gap      4 - 13 mmol/L 10   BUN      5 - 25 mg/dL 20   Creatinine      0 60 - 1 30 mg/dL 1 79 (H)   GLUCOSE FASTING      65 - 99 mg/dL 115 (H)   Calcium      8 3 - 10 1 mg/dL 9 2   AST      5 - 45 U/L 13   ALT      12 - 78 U/L 14   Alkaline Phosphatase      46 - 116 U/L 101   Total Protein      6 4 - 8 2 g/dL 7 3   Albumin      3 5 - 5 0 g/dL 2 9 (L)   TOTAL BILIRUBIN      0 20 - 1 00 mg/dL 0 39   eGFR      ml/min/1 73sq m 49   Cholesterol      50 - 200 mg/dL    Triglycerides      <=150 mg/dL    HDL      40 - 60 mg/dL    LDL Direct      0 - 100 mg/dL    EXT Creatinine Urine      mg/dL    MICROALBUM ,U,RANDOM      0 0 - 20 0 mg/L    MICROALBUMIN/CREATININE RATIO      0 - 30 mg/g creatinine    Hemoglobin A1C      4 2 - 6 3 % 10 1 (H)   EAG      mg/dl 243     Plan:    Catracho Brennan was seen today for consult  Diagnoses and all orders for this visit:    Type 2 diabetes mellitus with hyperglycemia, with long-term current use of insulin ([address] )  HGA1C 10 1%  Treatment regimen: patient has been non-compliant with diet and treatment; he reports he is simply "lazy " Discussed possible use of Omnipod pump with settings for large, medium, small meals, however he is not interested at this time  If he reconsiders, will set up appointment with Lakesha Melo to further discuss   Discussed the importance of adhering to treatment; discussed risks/complications associated with uncontrolled diabetes, retinopathy, neuropathy, nephropathy, MI, stroke, coma, death  Start Trulicity 8 28 mg weekly  Discussed side effects, safety/efficacy  Denies history of pancreatitis or family history of medullary thyroid cancer  Discussed it may help with weight loss  Take Lantus 50 units twice a day and Humalog 28 units 3 times a day with meals  Send log in 1 week  Discussed intensive insulin regimen does increase risk for hypoglycemia  Episodes of hypoglycemia can lead to permanent disability and death  Advised to adhere to diabetic diet, and recommended staying active/exercising routinely as tolerated  Keep carbohydrates consistent to limit blood glucose fluctuations  Advised to call if blood sugars less than 70 mg/dl or over 300 mg/dl  Check blood glucose 4 times a day  Discussed symptoms and treatment of hypoglycemia  Recommended routine follow-up with podiatry and ophthalmology  Send log in 2 weeks  Ordered blood work to complete prior to next visit  -     Hemoglobin A1C; Future  -     Basic metabolic panel; Future  -     Dulaglutide (TRULICITY) 8 30 VN/7 9KP SOPN; Inject 0 5 mL (0 75 mg total) under the skin once a week  -     insulin lispro (HumaLOG) 100 units/mL injection pen; Inject 28 Units under the skin 3 (three) times a day with meals  -     Ambulatory referral to Nutrition Services; Future    Benign hypertension with CKD (chronic kidney disease) stage III (HCC)  Blood pressure adequately controlled, continue current treatment  Creatinine 1 79, GFR 49  Managed by nephrology   -     Basic metabolic panel; Future    Mixed hyperlipidemia  LDL previously 28, at goal   Continue statin therapy  Managed by PCP       Discussed with the patient diagnosis and treatment and all questions fully answered  He will call me if any problems arise      Counseled patient on diagnostic results, prognosis, risk and benefit of treatment options, instruction for management, importance of treatment compliance, risk factor reduction and impressions      Katherine Bo PA-C      [facility] ENDOCR[facility]OLOG[facility]

## 2020-02-11 NOTE — PATIENT INSTRUCTIONS
Hypoglycemia instructions   Dejan Wei  2/11/2020  [id number]    Low Blood Sugar    Steps to treat low blood sugar  1  Test blood sugar if you have symptoms of low blood sugar:   Low Blood Sugar Symptoms:  o Sweaty  o Dizzy  o Rapid heartbeat  o Shaky  o Bad mood  o Hungry      2  Treat blood sugar less than 70 with 15 grams of fast-acting carbohydrate:   Examples of 15 grams Fast-Acting Carbohydrate:  o 4 oz juice  o 4 oz regular soda  o 3-4 glucose tablets (chew)  o 3-4 hard candies (chew)          3  Wait 15 minutes and test your blood sugar again     4   If blood sugar is less than 100, repeat steps 2-3     5  When your blood sugar is 100 or more, eat a snack if it will be longer than one hour until your next meal  The snack should be 15 grams of carbohydrate and a protein:   Examples of snacks:  o ½ sandwich  o 6 crackers with cheese  o Piece of fruit with cheese or peanut butter  o 6 crackers with peanut butter

## 2020-02-24 DIAGNOSIS — IMO0002 UNCONTROLLED TYPE 2 DIABETES MELLITUS WITH DIABETIC POLYNEUROPATHY, WITH LONG-TERM CURRENT USE OF INSULIN: ICD-10-CM

## 2020-02-24 RX ORDER — LANCETS 28 GAUGE
EACH MISCELLANEOUS
Qty: 300 EACH | Refills: 0 | Status: SHIPPED | OUTPATIENT
Start: 2020-02-24

## 2020-03-06 ENCOUNTER — SOCIAL WORK (OUTPATIENT)
Dept: BEHAVIORAL/MENTAL HEALTH CLINIC | Facility: CLINIC | Age: 55
End: 2020-03-06
Payer: MEDICARE

## 2020-03-06 DIAGNOSIS — F31.61 BIPOLAR DISORDER, CURRENT EPISODE MIXED, MILD (HCC): Primary | ICD-10-CM

## 2020-03-06 DIAGNOSIS — G47.00 INSOMNIA, UNSPECIFIED TYPE: ICD-10-CM

## 2020-03-06 DIAGNOSIS — F41.1 GENERALIZED ANXIETY DISORDER: ICD-10-CM

## 2020-03-06 DIAGNOSIS — Z63.4 BEREAVEMENT: ICD-10-CM

## 2020-03-06 DIAGNOSIS — F41.0 PANIC ATTACKS: ICD-10-CM

## 2020-03-06 PROCEDURE — 90834 PSYTX W PT 45 MINUTES: CPT | Performed by: SOCIAL WORKER

## 2020-03-06 PROCEDURE — 1036F TOBACCO NON-USER: CPT | Performed by: SOCIAL WORKER

## 2020-03-06 SDOH — SOCIAL STABILITY - SOCIAL INSECURITY: DISSAPEARANCE AND DEATH OF FAMILY MEMBER: Z63.4

## 2020-03-06 NOTE — BH TREATMENT PLAN
Charito Ruano  1965       Date of Initial Treatment Plan: 05/7/2018   Date of Current Treatment Plan: 03/06/20    Treatment Plan Number 4th update     Strengths/Personal Resources for Self Care: kind, helpful,passion for life    Diagnosis: Major depression, THERESE    Area of Needs: I still need to attend to my overall health via diet and exercise,   I still need to keep my depression and anxiety to a minimum level      Long Term Goal 1: I still need to attend to my overall level of health via diet and exercise    Target Date:07/06/2020  Completion Date: TBD         Short Term Objectives for Goal 1: I am watching my sugars and carbs and start to work out    Long Term Goal 2: I still need to manage my depression and my anxiety to a minimum level    Target Date: 07/06/2020  Completion Date: TBD    Short Term Objectives for Goal 2: mindfulness strategies         Long Term Goal # 3: N/A     Target Date: N/A  Completion Date: N/A    Short Term Objectives for Goal 3: N/A    GOAL 1: Modality: Individual 2x per month   Completion Date tbd    GOAL 2: Modality: Individual 2x per month   Completion Date tbd     GOAL 3: Modality:individual 2x per month   Completion Date TBD       Behavioral Health Treatment Plan  Luke: Diagnosis and Treatment Plan explained to [place] relates understanding diagnosis and is agreeable to Treatment Plan  Client Comments : Please share your thoughts, feelings, need and/or experiences regarding your treatment plan: These are jignesh's goals and the undersigned will help him accomplish these

## 2020-03-06 NOTE — PSYCH
Psychotherapy Provided: Individual Psychotherapy 45 minutes     Length of time in session: 45 minutes, follow up in 2 week    Goals addressed in session: Goal 1, Goal 2 and Goal 3      Pain:      moderate to severe    0    Current suicide risk : Low     Data: Ellis Saini arrived for his session  He is starting to look for his own house for him, his partner, and his niece  They will be leaving his partners father and her daughter and her grandchildren  Ellis Saini shared this will help them emotionally  He feels his depression is under control as is his anxiety  He shared he no longer has panic attacks  Assessment: We continue to work on mindfulness strategies to help him manage his depression and his anxiety  We also discussed healthy diet and exercise  Ellis Saini is excited about looking for a new home  Plan: Continue to help him skill build in distress tolerance and to help him forge ahead on his goals  Behavioral Health Treatment Plan [facility]: Diagnosis and Treatment Plan explained to Terry Eloisa relates understanding diagnosis and is agreeable to Treatment Plan   Yes

## 2020-03-27 ENCOUNTER — TELEPHONE (OUTPATIENT)
Dept: NEPHROLOGY | Facility: CLINIC | Age: 55
End: 2020-03-27

## 2020-03-27 NOTE — TELEPHONE ENCOUNTER
I called patient on 3/27/2020 to schedule May follow up with Dr Naomi Issa  Patient did not answer  I left a message for patient to call us back

## 2020-04-02 ENCOUNTER — TELEMEDICINE (OUTPATIENT)
Dept: PSYCHIATRY | Facility: CLINIC | Age: 55
End: 2020-04-02
Payer: MEDICARE

## 2020-04-02 DIAGNOSIS — F31.61 BIPOLAR DISORDER, CURRENT EPISODE MIXED, MILD (HCC): Primary | ICD-10-CM

## 2020-04-02 DIAGNOSIS — Z79.4 TYPE 2 DIABETES MELLITUS WITH HYPERGLYCEMIA, WITH LONG-TERM CURRENT USE OF INSULIN (HCC): Primary | ICD-10-CM

## 2020-04-02 DIAGNOSIS — G47.00 INSOMNIA, UNSPECIFIED TYPE: ICD-10-CM

## 2020-04-02 DIAGNOSIS — Z79.4 TYPE 2 DIABETES MELLITUS WITH HYPERGLYCEMIA, WITH LONG-TERM CURRENT USE OF INSULIN (HCC): ICD-10-CM

## 2020-04-02 DIAGNOSIS — F41.0 PANIC ATTACKS: ICD-10-CM

## 2020-04-02 DIAGNOSIS — E11.65 TYPE 2 DIABETES MELLITUS WITH HYPERGLYCEMIA, WITH LONG-TERM CURRENT USE OF INSULIN (HCC): Primary | ICD-10-CM

## 2020-04-02 DIAGNOSIS — F41.1 GENERALIZED ANXIETY DISORDER: Chronic | ICD-10-CM

## 2020-04-02 DIAGNOSIS — E11.65 TYPE 2 DIABETES MELLITUS WITH HYPERGLYCEMIA, WITH LONG-TERM CURRENT USE OF INSULIN (HCC): ICD-10-CM

## 2020-04-02 PROCEDURE — 99214 OFFICE O/P EST MOD 30 MIN: CPT | Performed by: PHYSICIAN ASSISTANT

## 2020-04-02 RX ORDER — TRAZODONE HYDROCHLORIDE 50 MG/1
50 TABLET ORAL
Qty: 90 TABLET | Refills: 0 | Status: SHIPPED | OUTPATIENT
Start: 2020-04-02 | End: 2020-06-22

## 2020-04-02 RX ORDER — CLONAZEPAM 1 MG/1
1 TABLET ORAL DAILY PRN
Qty: 30 TABLET | Refills: 2 | Status: SHIPPED | OUTPATIENT
Start: 2020-04-02 | End: 2020-06-22

## 2020-04-02 RX ORDER — BUSPIRONE HYDROCHLORIDE 10 MG/1
10 TABLET ORAL 2 TIMES DAILY
Qty: 180 TABLET | Refills: 0 | Status: SHIPPED | OUTPATIENT
Start: 2020-04-02 | End: 2020-06-22

## 2020-04-02 RX ORDER — BUPROPION HYDROCHLORIDE 100 MG/1
100 TABLET ORAL DAILY
Qty: 90 TABLET | Refills: 0 | Status: SHIPPED | OUTPATIENT
Start: 2020-04-02 | End: 2020-06-22

## 2020-04-06 ENCOUNTER — TELEMEDICINE (OUTPATIENT)
Dept: BEHAVIORAL/MENTAL HEALTH CLINIC | Facility: CLINIC | Age: 55
End: 2020-04-06
Payer: MEDICARE

## 2020-04-06 DIAGNOSIS — Z63.4 BEREAVEMENT: ICD-10-CM

## 2020-04-06 DIAGNOSIS — F31.61 BIPOLAR DISORDER, CURRENT EPISODE MIXED, MILD (HCC): Primary | ICD-10-CM

## 2020-04-06 DIAGNOSIS — G47.00 INSOMNIA, UNSPECIFIED TYPE: ICD-10-CM

## 2020-04-06 DIAGNOSIS — F41.0 PANIC ATTACKS: ICD-10-CM

## 2020-04-06 DIAGNOSIS — F41.1 GENERALIZED ANXIETY DISORDER: ICD-10-CM

## 2020-04-06 PROCEDURE — 90834 PSYTX W PT 45 MINUTES: CPT | Performed by: SOCIAL WORKER

## 2020-04-06 SDOH — SOCIAL STABILITY - SOCIAL INSECURITY: DISSAPEARANCE AND DEATH OF FAMILY MEMBER: Z63.4

## 2020-05-06 ENCOUNTER — TELEMEDICINE (OUTPATIENT)
Dept: BEHAVIORAL/MENTAL HEALTH CLINIC | Facility: CLINIC | Age: 55
End: 2020-05-06
Payer: MEDICARE

## 2020-05-06 DIAGNOSIS — Z63.4 BEREAVEMENT: ICD-10-CM

## 2020-05-06 DIAGNOSIS — F41.0 PANIC ATTACKS: ICD-10-CM

## 2020-05-06 DIAGNOSIS — G47.00 INSOMNIA, UNSPECIFIED TYPE: ICD-10-CM

## 2020-05-06 DIAGNOSIS — F31.61 BIPOLAR DISORDER, CURRENT EPISODE MIXED, MILD (HCC): Primary | ICD-10-CM

## 2020-05-06 DIAGNOSIS — F41.1 GENERALIZED ANXIETY DISORDER: ICD-10-CM

## 2020-05-06 PROCEDURE — 90834 PSYTX W PT 45 MINUTES: CPT | Performed by: SOCIAL WORKER

## 2020-05-06 SDOH — SOCIAL STABILITY - SOCIAL INSECURITY: DISSAPEARANCE AND DEATH OF FAMILY MEMBER: Z63.4

## 2020-06-03 ENCOUNTER — TELEMEDICINE (OUTPATIENT)
Dept: NEPHROLOGY | Facility: CLINIC | Age: 55
End: 2020-06-03
Payer: MEDICARE

## 2020-06-03 ENCOUNTER — LAB (OUTPATIENT)
Dept: LAB | Facility: CLINIC | Age: 55
End: 2020-06-03
Payer: MEDICARE

## 2020-06-03 VITALS — BODY MASS INDEX: 55.65 KG/M2 | HEIGHT: 66 IN

## 2020-06-03 DIAGNOSIS — G47.33 OSA (OBSTRUCTIVE SLEEP APNEA): ICD-10-CM

## 2020-06-03 DIAGNOSIS — D63.1 ANEMIA DUE TO STAGE 3 CHRONIC KIDNEY DISEASE (HCC): Primary | ICD-10-CM

## 2020-06-03 DIAGNOSIS — Z79.4 TYPE 2 DIABETES MELLITUS WITH HYPERGLYCEMIA, WITH LONG-TERM CURRENT USE OF INSULIN (HCC): ICD-10-CM

## 2020-06-03 DIAGNOSIS — N18.30 BENIGN HYPERTENSION WITH CKD (CHRONIC KIDNEY DISEASE) STAGE III (HCC): ICD-10-CM

## 2020-06-03 DIAGNOSIS — N18.30 STAGE 3 CHRONIC KIDNEY DISEASE (HCC): ICD-10-CM

## 2020-06-03 DIAGNOSIS — N18.30 ANEMIA DUE TO STAGE 3 CHRONIC KIDNEY DISEASE (HCC): Primary | ICD-10-CM

## 2020-06-03 DIAGNOSIS — E11.65 TYPE 2 DIABETES MELLITUS WITH HYPERGLYCEMIA, WITH LONG-TERM CURRENT USE OF INSULIN (HCC): ICD-10-CM

## 2020-06-03 DIAGNOSIS — R79.89 ELEVATED SERUM CREATININE: ICD-10-CM

## 2020-06-03 DIAGNOSIS — N18.30 ANEMIA DUE TO STAGE 3 CHRONIC KIDNEY DISEASE (HCC): ICD-10-CM

## 2020-06-03 DIAGNOSIS — I12.9 BENIGN HYPERTENSION WITH CKD (CHRONIC KIDNEY DISEASE) STAGE III (HCC): ICD-10-CM

## 2020-06-03 DIAGNOSIS — D63.1 ANEMIA DUE TO STAGE 3 CHRONIC KIDNEY DISEASE (HCC): ICD-10-CM

## 2020-06-03 DIAGNOSIS — R80.1 PERSISTENT PROTEINURIA: ICD-10-CM

## 2020-06-03 DIAGNOSIS — E78.2 MIXED HYPERLIPIDEMIA: ICD-10-CM

## 2020-06-03 LAB
25(OH)D3 SERPL-MCNC: 15 NG/ML (ref 30–100)
ALBUMIN SERPL BCP-MCNC: 3 G/DL (ref 3.5–5)
ALP SERPL-CCNC: 96 U/L (ref 46–116)
ALT SERPL W P-5'-P-CCNC: 15 U/L (ref 12–78)
ANION GAP SERPL CALCULATED.3IONS-SCNC: 4 MMOL/L (ref 4–13)
AST SERPL W P-5'-P-CCNC: 13 U/L (ref 5–45)
BILIRUB SERPL-MCNC: 0.31 MG/DL (ref 0.2–1)
BUN SERPL-MCNC: 17 MG/DL (ref 5–25)
CALCIUM SERPL-MCNC: 8.9 MG/DL (ref 8.3–10.1)
CHLORIDE SERPL-SCNC: 102 MMOL/L (ref 100–108)
CHOLEST SERPL-MCNC: 94 MG/DL (ref 50–200)
CO2 SERPL-SCNC: 30 MMOL/L (ref 21–32)
CREAT SERPL-MCNC: 1.98 MG/DL (ref 0.6–1.3)
CREAT UR-MCNC: 168 MG/DL
ERYTHROCYTE [DISTWIDTH] IN BLOOD BY AUTOMATED COUNT: 14.7 % (ref 11.6–15.1)
EST. AVERAGE GLUCOSE BLD GHB EST-MCNC: 252 MG/DL
FERRITIN SERPL-MCNC: 119 NG/ML (ref 8–388)
GFR SERPL CREATININE-BSD FRML MDRD: 43 ML/MIN/1.73SQ M
GLUCOSE P FAST SERPL-MCNC: 233 MG/DL (ref 65–99)
HBA1C MFR BLD: 10.4 %
HCT VFR BLD AUTO: 37.4 % (ref 36.5–49.3)
HDLC SERPL-MCNC: 33 MG/DL
HGB BLD-MCNC: 12.2 G/DL (ref 12–17)
IRON SATN MFR SERPL: 28 %
IRON SERPL-MCNC: 61 UG/DL (ref 65–175)
LDLC SERPL CALC-MCNC: 39 MG/DL (ref 0–100)
MCH RBC QN AUTO: 25.2 PG (ref 26.8–34.3)
MCHC RBC AUTO-ENTMCNC: 32.6 G/DL (ref 31.4–37.4)
MCV RBC AUTO: 77 FL (ref 82–98)
PLATELET # BLD AUTO: 203 THOUSANDS/UL (ref 149–390)
PMV BLD AUTO: 10.3 FL (ref 8.9–12.7)
POTASSIUM SERPL-SCNC: 4.7 MMOL/L (ref 3.5–5.3)
PROT SERPL-MCNC: 7.5 G/DL (ref 6.4–8.2)
PROT UR-MCNC: 71 MG/DL
PROT/CREAT UR: 0.42 MG/G{CREAT} (ref 0–0.1)
PTH-INTACT SERPL-MCNC: 66.3 PG/ML (ref 18.4–80.1)
RBC # BLD AUTO: 4.84 MILLION/UL (ref 3.88–5.62)
SODIUM SERPL-SCNC: 136 MMOL/L (ref 136–145)
TIBC SERPL-MCNC: 219 UG/DL (ref 250–450)
TRIGL SERPL-MCNC: 110 MG/DL
WBC # BLD AUTO: 7.16 THOUSAND/UL (ref 4.31–10.16)

## 2020-06-03 PROCEDURE — 80053 COMPREHEN METABOLIC PANEL: CPT | Performed by: PHYSICIAN ASSISTANT

## 2020-06-03 PROCEDURE — 84156 ASSAY OF PROTEIN URINE: CPT

## 2020-06-03 PROCEDURE — 82570 ASSAY OF URINE CREATININE: CPT

## 2020-06-03 PROCEDURE — 83540 ASSAY OF IRON: CPT

## 2020-06-03 PROCEDURE — 83550 IRON BINDING TEST: CPT

## 2020-06-03 PROCEDURE — 85027 COMPLETE CBC AUTOMATED: CPT

## 2020-06-03 PROCEDURE — 83036 HEMOGLOBIN GLYCOSYLATED A1C: CPT

## 2020-06-03 PROCEDURE — 99443 PR PHYS/QHP TELEPHONE EVALUATION 21-30 MIN: CPT | Performed by: NURSE PRACTITIONER

## 2020-06-03 PROCEDURE — 80061 LIPID PANEL: CPT | Performed by: PHYSICIAN ASSISTANT

## 2020-06-03 PROCEDURE — 83970 ASSAY OF PARATHORMONE: CPT

## 2020-06-03 PROCEDURE — 82306 VITAMIN D 25 HYDROXY: CPT

## 2020-06-03 PROCEDURE — 82728 ASSAY OF FERRITIN: CPT

## 2020-06-03 PROCEDURE — 36415 COLL VENOUS BLD VENIPUNCTURE: CPT | Performed by: PHYSICIAN ASSISTANT

## 2020-06-03 RX ORDER — IRON,CARBONYL/ASCORBIC ACID 100-250 MG
1 TABLET ORAL DAILY
Qty: 90 EACH | Refills: 3 | Status: SHIPPED | OUTPATIENT
Start: 2020-06-03 | End: 2020-09-16 | Stop reason: ALTCHOICE

## 2020-06-08 ENCOUNTER — TELEPHONE (OUTPATIENT)
Dept: INTERNAL MEDICINE CLINIC | Facility: CLINIC | Age: 55
End: 2020-06-08

## 2020-06-08 ENCOUNTER — TELEMEDICINE (OUTPATIENT)
Dept: BEHAVIORAL/MENTAL HEALTH CLINIC | Facility: CLINIC | Age: 55
End: 2020-06-08
Payer: MEDICARE

## 2020-06-08 DIAGNOSIS — F41.1 GENERALIZED ANXIETY DISORDER: ICD-10-CM

## 2020-06-08 DIAGNOSIS — F41.0 PANIC ATTACKS: ICD-10-CM

## 2020-06-08 DIAGNOSIS — G47.00 INSOMNIA, UNSPECIFIED TYPE: ICD-10-CM

## 2020-06-08 DIAGNOSIS — Z63.4 BEREAVEMENT: ICD-10-CM

## 2020-06-08 DIAGNOSIS — F31.61 BIPOLAR DISORDER, CURRENT EPISODE MIXED, MILD (HCC): Primary | ICD-10-CM

## 2020-06-08 PROCEDURE — 90834 PSYTX W PT 45 MINUTES: CPT | Performed by: SOCIAL WORKER

## 2020-06-08 SDOH — SOCIAL STABILITY - SOCIAL INSECURITY: DISSAPEARANCE AND DEATH OF FAMILY MEMBER: Z63.4

## 2020-06-09 ENCOUNTER — OFFICE VISIT (OUTPATIENT)
Dept: MULTI SPECIALTY CLINIC | Facility: CLINIC | Age: 55
End: 2020-06-09

## 2020-06-09 VITALS
HEIGHT: 66 IN | DIASTOLIC BLOOD PRESSURE: 78 MMHG | BODY MASS INDEX: 50.62 KG/M2 | SYSTOLIC BLOOD PRESSURE: 130 MMHG | WEIGHT: 315 LBS | TEMPERATURE: 98.1 F

## 2020-06-09 DIAGNOSIS — Z79.4 TYPE 2 DIABETES MELLITUS WITH HYPERGLYCEMIA, WITH LONG-TERM CURRENT USE OF INSULIN (HCC): Primary | ICD-10-CM

## 2020-06-09 DIAGNOSIS — N18.30 BENIGN HYPERTENSION WITH CKD (CHRONIC KIDNEY DISEASE) STAGE III (HCC): ICD-10-CM

## 2020-06-09 DIAGNOSIS — I12.9 BENIGN HYPERTENSION WITH CKD (CHRONIC KIDNEY DISEASE) STAGE III (HCC): ICD-10-CM

## 2020-06-09 DIAGNOSIS — E78.2 MIXED HYPERLIPIDEMIA: ICD-10-CM

## 2020-06-09 DIAGNOSIS — E11.65 TYPE 2 DIABETES MELLITUS WITH HYPERGLYCEMIA, WITH LONG-TERM CURRENT USE OF INSULIN (HCC): Primary | ICD-10-CM

## 2020-06-09 PROCEDURE — 3078F DIAST BP <80 MM HG: CPT | Performed by: PHYSICIAN ASSISTANT

## 2020-06-09 PROCEDURE — 3075F SYST BP GE 130 - 139MM HG: CPT | Performed by: PHYSICIAN ASSISTANT

## 2020-06-09 PROCEDURE — 99214 OFFICE O/P EST MOD 30 MIN: CPT | Performed by: PHYSICIAN ASSISTANT

## 2020-06-09 PROCEDURE — 1036F TOBACCO NON-USER: CPT | Performed by: PHYSICIAN ASSISTANT

## 2020-06-09 PROCEDURE — 3008F BODY MASS INDEX DOCD: CPT | Performed by: PHYSICIAN ASSISTANT

## 2020-06-22 ENCOUNTER — TELEMEDICINE (OUTPATIENT)
Dept: PSYCHIATRY | Facility: CLINIC | Age: 55
End: 2020-06-22
Payer: MEDICARE

## 2020-06-22 DIAGNOSIS — F41.1 GENERALIZED ANXIETY DISORDER: Chronic | ICD-10-CM

## 2020-06-22 DIAGNOSIS — F31.61 BIPOLAR DISORDER, CURRENT EPISODE MIXED, MILD (HCC): ICD-10-CM

## 2020-06-22 DIAGNOSIS — F41.0 PANIC ATTACKS: ICD-10-CM

## 2020-06-22 DIAGNOSIS — G47.00 INSOMNIA, UNSPECIFIED TYPE: ICD-10-CM

## 2020-06-22 PROCEDURE — 99443 PR PHYS/QHP TELEPHONE EVALUATION 21-30 MIN: CPT | Performed by: PHYSICIAN ASSISTANT

## 2020-06-22 RX ORDER — TRAZODONE HYDROCHLORIDE 50 MG/1
50 TABLET ORAL
Qty: 90 TABLET | Refills: 0 | Status: SHIPPED | OUTPATIENT
Start: 2020-06-22 | End: 2020-09-09

## 2020-06-22 RX ORDER — BUSPIRONE HYDROCHLORIDE 15 MG/1
15 TABLET ORAL 2 TIMES DAILY
Qty: 180 TABLET | Refills: 0 | Status: SHIPPED | OUTPATIENT
Start: 2020-06-22 | End: 2020-09-09

## 2020-06-22 RX ORDER — BUPROPION HYDROCHLORIDE 100 MG/1
100 TABLET ORAL DAILY
Qty: 90 TABLET | Refills: 0 | Status: SHIPPED | OUTPATIENT
Start: 2020-06-22 | End: 2020-09-09

## 2020-06-22 RX ORDER — CLONAZEPAM 1 MG/1
1 TABLET ORAL DAILY PRN
Qty: 30 TABLET | Refills: 2 | Status: SHIPPED | OUTPATIENT
Start: 2020-06-22 | End: 2020-10-01 | Stop reason: SDUPTHER

## 2020-06-23 ENCOUNTER — TELEPHONE (OUTPATIENT)
Dept: INTERNAL MEDICINE CLINIC | Facility: CLINIC | Age: 55
End: 2020-06-23

## 2020-06-23 NOTE — TELEPHONE ENCOUNTER
Form for patients GLUCOSE TEST STRIPS to be filled out and faxed to [place]  Due to patient having Medicare, A DWO form must be filled out for patient to receive his test strips  Placed in the [facility] folder

## 2020-06-29 ENCOUNTER — OFFICE VISIT (OUTPATIENT)
Dept: MULTI SPECIALTY CLINIC | Facility: CLINIC | Age: 55
End: 2020-06-29

## 2020-06-29 VITALS
HEIGHT: 66 IN | WEIGHT: 315 LBS | SYSTOLIC BLOOD PRESSURE: 118 MMHG | DIASTOLIC BLOOD PRESSURE: 72 MMHG | TEMPERATURE: 97 F | BODY MASS INDEX: 50.62 KG/M2

## 2020-06-29 DIAGNOSIS — B35.1 ONYCHOMYCOSIS: ICD-10-CM

## 2020-06-29 DIAGNOSIS — E11.65 TYPE 2 DIABETES MELLITUS WITH HYPERGLYCEMIA, WITH LONG-TERM CURRENT USE OF INSULIN (HCC): Primary | ICD-10-CM

## 2020-06-29 DIAGNOSIS — Z79.4 TYPE 2 DIABETES MELLITUS WITH HYPERGLYCEMIA, WITH LONG-TERM CURRENT USE OF INSULIN (HCC): Primary | ICD-10-CM

## 2020-06-29 PROCEDURE — 3074F SYST BP LT 130 MM HG: CPT | Performed by: PODIATRIST

## 2020-06-29 PROCEDURE — 99213 OFFICE O/P EST LOW 20 MIN: CPT | Performed by: PODIATRIST

## 2020-06-29 PROCEDURE — 11721 DEBRIDE NAIL 6 OR MORE: CPT | Performed by: PODIATRIST

## 2020-06-29 PROCEDURE — 3078F DIAST BP <80 MM HG: CPT | Performed by: PODIATRIST

## 2020-06-29 PROCEDURE — 1036F TOBACCO NON-USER: CPT | Performed by: PODIATRIST

## 2020-06-29 PROCEDURE — 3008F BODY MASS INDEX DOCD: CPT | Performed by: PODIATRIST

## 2020-07-06 ENCOUNTER — TELEMEDICINE (OUTPATIENT)
Dept: BEHAVIORAL/MENTAL HEALTH CLINIC | Facility: CLINIC | Age: 55
End: 2020-07-06
Payer: MEDICARE

## 2020-07-06 DIAGNOSIS — E11.42 DIABETIC POLYNEUROPATHY ASSOCIATED WITH TYPE 2 DIABETES MELLITUS (HCC): ICD-10-CM

## 2020-07-06 DIAGNOSIS — Z63.4 BEREAVEMENT: ICD-10-CM

## 2020-07-06 DIAGNOSIS — F31.61 BIPOLAR DISORDER, CURRENT EPISODE MIXED, MILD (HCC): Primary | ICD-10-CM

## 2020-07-06 DIAGNOSIS — K44.9 HIATAL HERNIA: ICD-10-CM

## 2020-07-06 DIAGNOSIS — K22.2 LOWER ESOPHAGEAL RING (SCHATZKI): ICD-10-CM

## 2020-07-06 DIAGNOSIS — F41.0 PANIC ATTACKS: ICD-10-CM

## 2020-07-06 DIAGNOSIS — F41.1 GENERALIZED ANXIETY DISORDER: ICD-10-CM

## 2020-07-06 DIAGNOSIS — G47.00 INSOMNIA, UNSPECIFIED TYPE: ICD-10-CM

## 2020-07-06 DIAGNOSIS — K21.00 GASTROESOPHAGEAL REFLUX DISEASE WITH ESOPHAGITIS: ICD-10-CM

## 2020-07-06 DIAGNOSIS — I10 BENIGN ESSENTIAL HYPERTENSION: ICD-10-CM

## 2020-07-06 DIAGNOSIS — E11.65 TYPE 2 DIABETES MELLITUS WITH HYPERGLYCEMIA, UNSPECIFIED WHETHER LONG TERM INSULIN USE (HCC): ICD-10-CM

## 2020-07-06 PROCEDURE — 1036F TOBACCO NON-USER: CPT | Performed by: SOCIAL WORKER

## 2020-07-06 PROCEDURE — 90834 PSYTX W PT 45 MINUTES: CPT | Performed by: SOCIAL WORKER

## 2020-07-06 RX ORDER — LISINOPRIL AND HYDROCHLOROTHIAZIDE 25; 20 MG/1; MG/1
TABLET ORAL
Qty: 90 TABLET | Refills: 1 | Status: SHIPPED | OUTPATIENT
Start: 2020-07-06 | End: 2020-12-28

## 2020-07-06 RX ORDER — AMLODIPINE BESYLATE 5 MG/1
TABLET ORAL
Qty: 90 TABLET | Refills: 1 | Status: SHIPPED | OUTPATIENT
Start: 2020-07-06 | End: 2020-12-28

## 2020-07-06 RX ORDER — OMEPRAZOLE 20 MG/1
CAPSULE, DELAYED RELEASE ORAL
Qty: 90 CAPSULE | Refills: 1 | Status: SHIPPED | OUTPATIENT
Start: 2020-07-06 | End: 2020-12-28

## 2020-07-06 RX ORDER — GABAPENTIN 300 MG/1
CAPSULE ORAL
Qty: 180 CAPSULE | Refills: 1 | Status: SHIPPED | OUTPATIENT
Start: 2020-07-06 | End: 2020-12-28

## 2020-07-06 RX ORDER — ATORVASTATIN CALCIUM 10 MG/1
TABLET, FILM COATED ORAL
Qty: 90 TABLET | Refills: 1 | Status: SHIPPED | OUTPATIENT
Start: 2020-07-06 | End: 2020-12-28

## 2020-07-06 SDOH — SOCIAL STABILITY - SOCIAL INSECURITY: DISSAPEARANCE AND DEATH OF FAMILY MEMBER: Z63.4

## 2020-07-06 NOTE — PSYCH
Psychotherapy Provided: Individual Psychotherapy 40 minutes     Length of time in session: 40 minutes, follow up in 2 week    Goals addressed in session: Goal 1, Goal 2 and Goal 3      Pain:      moderate to severe    0    Current suicide risk : Low     Data: I spoke to the client for a virtual phone call he wanted to do a video but does not have the camera to work  I tried to put a virtual note in and it would not let me  We discussed how he is managing his depression and his anxiety  He discussed how he is coping during the pandemic  Assessment: He denies suicidal ideation or homicidal ideation plan or intent  He is dealing but admits he is not eating a healthy diet or exercising  We discussed mindfulness strategies and cognitive behavioral strategies  Plan: Continue to help him progress on his goals  Behavioral Health Treatment Plan [facility]: Diagnosis and Treatment Plan explained to Kae Craft relates understanding diagnosis and is agreeable to Treatment Plan   Yes

## 2020-08-06 DIAGNOSIS — Z79.4 TYPE 2 DIABETES MELLITUS WITH HYPERGLYCEMIA, WITH LONG-TERM CURRENT USE OF INSULIN (HCC): ICD-10-CM

## 2020-08-06 DIAGNOSIS — E11.65 TYPE 2 DIABETES MELLITUS WITH HYPERGLYCEMIA, WITH LONG-TERM CURRENT USE OF INSULIN (HCC): ICD-10-CM

## 2020-08-07 ENCOUNTER — TELEPHONE (OUTPATIENT)
Dept: INTERNAL MEDICINE CLINIC | Facility: CLINIC | Age: 55
End: 2020-08-07

## 2020-08-10 RX ORDER — INSULIN LISPRO 100 [IU]/ML
INJECTION, SOLUTION INTRAVENOUS; SUBCUTANEOUS
Qty: 30 PEN | Refills: 1 | Status: SHIPPED | OUTPATIENT
Start: 2020-08-10 | End: 2021-01-07

## 2020-09-02 ENCOUNTER — TELEPHONE (OUTPATIENT)
Dept: NEPHROLOGY | Facility: CLINIC | Age: 55
End: 2020-09-02

## 2020-09-02 NOTE — TELEPHONE ENCOUNTER
A message was left requesting a call back to the office to schedule an October follow up with an AP

## 2020-09-03 ENCOUNTER — OFFICE VISIT (OUTPATIENT)
Dept: INTERNAL MEDICINE CLINIC | Facility: CLINIC | Age: 55
End: 2020-09-03

## 2020-09-03 VITALS
HEART RATE: 73 BPM | OXYGEN SATURATION: 96 % | BODY MASS INDEX: 50.62 KG/M2 | WEIGHT: 315 LBS | DIASTOLIC BLOOD PRESSURE: 80 MMHG | SYSTOLIC BLOOD PRESSURE: 108 MMHG | TEMPERATURE: 98 F | HEIGHT: 66 IN

## 2020-09-03 DIAGNOSIS — G47.33 OSA (OBSTRUCTIVE SLEEP APNEA): ICD-10-CM

## 2020-09-03 DIAGNOSIS — E78.2 MIXED HYPERLIPIDEMIA: Primary | ICD-10-CM

## 2020-09-03 DIAGNOSIS — N18.30 BENIGN HYPERTENSION WITH CKD (CHRONIC KIDNEY DISEASE) STAGE III (HCC): ICD-10-CM

## 2020-09-03 DIAGNOSIS — E66.01 MORBID OBESITY WITH BMI OF 50.0-59.9, ADULT (HCC): ICD-10-CM

## 2020-09-03 DIAGNOSIS — Z79.4 TYPE 2 DIABETES MELLITUS WITH HYPERGLYCEMIA, WITH LONG-TERM CURRENT USE OF INSULIN (HCC): ICD-10-CM

## 2020-09-03 DIAGNOSIS — E11.65 TYPE 2 DIABETES MELLITUS WITH HYPERGLYCEMIA, WITH LONG-TERM CURRENT USE OF INSULIN (HCC): ICD-10-CM

## 2020-09-03 DIAGNOSIS — E11.42 DIABETIC POLYNEUROPATHY ASSOCIATED WITH TYPE 2 DIABETES MELLITUS (HCC): ICD-10-CM

## 2020-09-03 DIAGNOSIS — N18.30 STAGE 3 CHRONIC KIDNEY DISEASE (HCC): ICD-10-CM

## 2020-09-03 DIAGNOSIS — I12.9 BENIGN HYPERTENSION WITH CKD (CHRONIC KIDNEY DISEASE) STAGE III (HCC): ICD-10-CM

## 2020-09-03 PROCEDURE — 99214 OFFICE O/P EST MOD 30 MIN: CPT | Performed by: PHYSICIAN ASSISTANT

## 2020-09-03 RX ORDER — LURASIDONE HYDROCHLORIDE 120 MG/1
TABLET, FILM COATED ORAL
COMMUNITY
Start: 2020-06-22 | End: 2020-09-16 | Stop reason: SDUPTHER

## 2020-09-03 NOTE — PROGRESS NOTES
Assessment/Plan:  As we discussed today your blood pressure and cholesterol are excellent no adjustments needed  We did review however that your kidney function continues to decline and this is a direct result of your diabetes not being well controlled  The only way to prevent progression to dialysis is to get your sugars under better control and to have them maintained there  As noted you are scheduled for labs next month and follow-up with the endocrinologist for diabetes  I encourage you to improve your eating patterns so that you will be able to use your mealtime insulin appropriately  We discussed once again today that we are all trying to help you to get your diabetes under better control  As we reviewed once again today you are aware the risk of ongoing uncontrolled diabetes will continue to worsen your kidney function which will eventually lead to dialysis as well as increased risk for early death  We did review and confirm that you received a phone call yesterday to get scheduled for your follow-up visit with the nephrologist   Please make sure to get all your labs completed prior to that visit  Diabetic foot exam completed today and you continue to experience numbness and burning  You are aware that your dose of gabapentin cannot be increased due to your kidney function levels but will speak with her psychiatrist later this month about possibly getting you trialed on Cymbalta or Lyrica for better pain relief  Confirmed you have an appointment scheduled with podiatrist in November and you will need your nails cut at that time as well  We also discussed that you agree to receive your flu vaccine this year but because you have Medicare you will need to receive this through your pharmacy in order to avoid of Bill  Confirmed you have an appointment later this month with sleep medicine to follow-up on your sleep apnea      After further discussion today you are agreeable to referral to weight management to help you with healthy meaningful weight loss which will also help improve your diabetes  No problem-specific Assessment & Plan notes found for this encounter  Diagnoses and all orders for this visit:    Mixed hyperlipidemia  -     Comprehensive metabolic panel; Future  -     Lipid Panel with Direct LDL reflex; Future    Benign hypertension with CKD (chronic kidney disease) stage III (HCC)    Stage 3 chronic kidney disease (HCC)    Type 2 diabetes mellitus with hyperglycemia, with long-term current use of insulin ([facility])  -     Comprehensive metabolic panel; Future  -     Lipid Panel with Direct LDL reflex; Future    Diabetic polyneuropathy associated with type 2 diabetes mellitus ([facility] [address] )    Morbid obesity with BMI of 50 0-59 9, adult [facility] )  -     Ambulatory referral to Weight Management; Future    DAISY (obstructive sleep apnea)    Other orders  -     Latuda 120 MG tablet          Subjective:      Patient ID: Zaheer Frank is a 48 y o  male  Patient presents today for follow-up of chronic medical conditions  As noted patient is poorly controlled diabetic  Patient now follows with [facility] endocrinology  Last visit July 2020  Patient is scheduled for labs in October an appointment after that for review  Please also see most recent endocrinology note patient continues to admit to noncompliance with medications and dietary advice  Patient is also following with nephrology for CKD stage 3 secondary to his diabetes  Last visit December of 2019  Patient was to follow-up in approximately 5 months which would have been May  Records shows no appointment has been scheduled  Likely held due to [place] pandemic but patient does need to be seen  Labs from June do show ongoing worsening of his kidney functions  Creatinine up to 1 98 and EGFR down to 43  Patient is aware on heart healthy diet avoidance of salt and NSAIDs    States they called him yesterday has to call back to schedule his appt  Patient is also aware that the only way to prevent progression of kidney disease is to get and keep his diabetes under better control  Lipids from June all normal       States taking lantus 50 twice a day    Humalog only using once a day because only eating once a day    Trulicity every Friday    States no changes to his sugars    Still doing phone visits with [facility] once a month but missed this month  Buspar was increased to 15 twice a day    Discussed with patient that he really needs to work to want to get his diabetes under better control  Patient verbalizes understanding and aware that should his kidney function continue to get worse he is going to progress to dialysis however does not seem to be all that bothered by that  Patient however after discussion was actually agreeable to referral to weight management and will remain hopeful that he will follow through on that appointment  Patient does continue to have diabetic neuropathic pain  As noted patient's gabapentin dose is limited secondary to his CKD and will discuss with his psychiatrist later this month if we can add Cymbalta or Lyrica all to try to get him better pain relief  Patient has an appointment later this month with sleep medicine for follow-up on his CPAP and sleep apnea                  The following portions of the patient's history were reviewed and updated as appropriate: allergies, current medications, past family history, past medical history, past social history, past surgical history and problem list     Review of Systems   Constitutional: Negative for chills and fever  Respiratory: Negative for cough, chest tightness and shortness of breath  Cardiovascular: Positive for leg swelling  Negative for chest pain and palpitations  Gastrointestinal: Negative for abdominal pain, diarrhea, nausea and vomiting  Musculoskeletal: Positive for arthralgias, back pain and myalgias  Skin: Negative for rash     Neurological: Positive for numbness  Negative for dizziness, light-headedness and headaches  Psychiatric/Behavioral:        Patient reports improvement in his mood stability with adjustments in his mental health medications  Objective:      /80 (BP Location: Right arm, Patient Position: Sitting, Cuff Size: Large)   Pulse 73   Temp 98 °F (36 7 °C) (Temporal)   Ht 5' 6" (1 676 m)   Wt (!) 154 kg (340 lb 6 2 oz)   SpO2 96%   BMI 54 94 kg/m²          Physical Exam  Vitals signs and nursing note reviewed  Constitutional:       General: He is not in acute distress  Appearance: He is obese  HENT:      Head: Normocephalic  Neck:      Vascular: No carotid bruit  Comments: Short thick neck  Cardiovascular:      Rate and Rhythm: Normal rate and regular rhythm  Pulses: no weak pulses     Heart sounds: Normal heart sounds  Pulmonary:      Effort: Pulmonary effort is normal  No respiratory distress  Abdominal:      Tenderness: There is no abdominal tenderness  Musculoskeletal:      Right lower leg: Edema present  Left lower leg: Edema present  Feet:    Feet:      Right foot:      Skin integrity: Callus and dry skin present  Left foot:      Skin integrity: Callus and dry skin present  Neurological:      Mental Status: He is alert  Sensory: Sensory deficit present  Psychiatric:         Mood and Affect: Mood normal          Thought Content: Thought content normal            Patient's shoes and socks removed  Right Foot/Ankle   Right Foot Inspection  Skin Exam: dry skin, callus and callus                            Sensory   Vibration: intact    Monofilament testing: diminished      Left Foot/Ankle  Left Foot Inspection  Skin Exam: dry skin and callus                                         Sensory   Vibration: intact    Monofilament: diminished    Assign Risk Category:  No deformity present; Loss of protective sensation; No weak pulses       Risk: 1      BMI Counseling:  Body mass index is 54 94 kg/m²  The BMI is above normal  Nutrition recommendations include reducing portion sizes, decreasing overall calorie intake, 3-5 servings of fruits/vegetables daily, consuming healthier snacks, decreasing soda and/or juice intake, moderation in carbohydrate intake, reducing intake of saturated fat and trans fat and reducing intake of cholesterol  Exercise recommendations include exercising 3-5 times per week

## 2020-09-03 NOTE — PATIENT INSTRUCTIONS
As we discussed today your blood pressure and cholesterol are excellent no adjustments needed  We did review however that your kidney function continues to decline and this is a direct result of your diabetes not being well controlled  The only way to prevent progression to dialysis is to get your sugars under better control and to have them maintained there  As noted you are scheduled for labs next month and follow-up with the endocrinologist for diabetes  I encourage you to improve your eating patterns so that you will be able to use your mealtime insulin appropriately  We discussed once again today that we are all trying to help you to get your diabetes under better control  As we reviewed once again today you are aware the risk of ongoing uncontrolled diabetes will continue to worsen your kidney function which will eventually lead to dialysis as well as increased risk for early death  We did review and confirm that you received a phone call yesterday to get scheduled for your follow-up visit with the nephrologist   Please make sure to get all your labs completed prior to that visit  Diabetic foot exam completed today and you continue to experience numbness and burning  You are aware that your dose of gabapentin cannot be increased due to your kidney function levels but will speak with her psychiatrist later this month about possibly getting you trialed on Cymbalta or Lyrica for better pain relief  Confirmed you have an appointment scheduled with podiatrist in November and you will need your nails cut at that time as well  We also discussed that you agree to receive your flu vaccine this year but because you have Medicare you will need to receive this through your pharmacy in order to avoid of Bill  Confirmed you have an appointment later this month with sleep medicine to follow-up on your sleep apnea      After further discussion today you are agreeable to referral to weight management to help you with healthy meaningful weight loss which will also help improve your diabetes  Weight Management   AMBULATORY CARE:   Why it is important to manage your weight:  Being overweight increases your risk of health conditions such as heart disease, high blood pressure, type 2 diabetes, and certain types of cancer  It can also increase your risk for osteoarthritis, sleep apnea, and other respiratory problems  Aim for a slow, steady weight loss  Even a small amount of weight loss can lower your risk of health problems  How to lose weight safely:  A safe and healthy way to lose weight is to eat fewer calories and get regular exercise  You can lose up about 1 pound a week by decreasing the number of calories you eat by 500 calories each day  You can decrease calories by eating smaller portion sizes or by cutting out high-calorie foods  Read labels to find out how many calories are in the foods you eat  You can also burn calories with exercise such as walking, swimming, or biking  You will be more likely to keep weight off if you make these changes part of your lifestyle  Healthy meal plan for weight management:  A healthy meal plan includes a variety of foods, contains fewer calories, and helps you stay healthy  A healthy meal plan includes the following:  · Eat whole-grain foods more often  A healthy meal plan should contain fiber  Fiber is the part of grains, fruits, and vegetables that is not broken down by your body  Whole-grain foods are healthy and provide extra fiber in your diet  Some examples of whole-grain foods are whole-wheat breads and pastas, oatmeal, brown rice, and bulgur  · Eat a variety of vegetables every day  Include dark, leafy greens such as spinach, kale, jammie greens, and mustard greens  Eat yellow and orange vegetables such as carrots, sweet potatoes, and winter squash  · Eat a variety of fruits every day    Choose fresh or canned fruit (canned in its own juice or light syrup) instead of juice  Fruit juice has very little or no fiber  · Eat low-fat dairy foods  Drink fat-free (skim) milk or 1% milk  Eat fat-free yogurt and low-fat cottage cheese  Try low-fat cheeses such as mozzarella and other reduced-fat cheeses  · Choose meat and other protein foods that are low in fat  Choose beans or other legumes such as split peas or lentils  Choose fish, skinless poultry (chicken or turkey), or lean cuts of red meat (beef or pork)  Before you cook meat or poultry, cut off any visible fat  · Use less fat and oil  Try baking foods instead of frying them  Add less fat, such as margarine, sour cream, regular salad dressing and mayonnaise to foods  Eat fewer high-fat foods  Some examples of high-fat foods include french fries, doughnuts, ice cream, and cakes  · Eat fewer sweets  Limit foods and drinks that are high in sugar  This includes candy, cookies, regular soda, and sweetened drinks  Ways to decrease calories:   · Eat smaller portions  ¨ Use a small plate with smaller servings  ¨ Do not eat second helpings  ¨ When you eat at a restaurant, ask for a box and place half of your meal in the box before you eat  ¨ Share an entrée with someone else  · Replace high-calorie snacks with healthy, low-calorie snacks  ¨ Choose fresh fruit, vegetables, fat-free rice cakes, or air-popped popcorn instead of potato chips, nuts, or chocolate  ¨ Choose water or calorie-free drinks instead of soda or sweetened drinks  · Eat regular meals  Skipping meals can lead to overeating later in the day  Eat a healthy snack in place of a meal if you do not have time to eat a regular meal      · Do not shop for groceries when you are hungry  You may be more likely to make unhealthy food choices  Take a grocery list of healthy foods and shop after you have eaten  Exercise:  Exercise at least 30 minutes per day on most days of the week   Some examples of exercise include walking, biking, dancing, and swimming  You can also fit in more physical activity by taking the stairs instead of the elevator or parking farther away from stores  Ask your healthcare provider about the best exercise plan for you  Other things to consider as you try to lose weight:   · Be aware of situations that may give you the urge to overeat, such as eating while watching television  Find ways to avoid these situations  For example, read a book, go for a walk, or do crafts  · Meet with a weight loss support group or friends who are also trying to lose weight  This may help you stay motivated to continue working on your weight loss goals  © 2017 [address]  Information is for End User's use only and may not be sold, redistributed or otherwise used for commercial purposes  All illustrations and images included in CareNotes® are the copyrighted property of [employer]  or Chris Grady  The above information is an  only  It is not intended as medical advice for individual conditions or treatments  Talk to your doctor, nurse or pharmacist before following any medical regimen to see if it is safe and effective for you  Low Fat Diet   AMBULATORY CARE:   A low-fat diet  is an eating plan that is low in total fat, unhealthy fat, and cholesterol  You may need to follow a low-fat diet if you have trouble digesting or absorbing fat  You may also need to follow this diet if you have high cholesterol  You can also lower your cholesterol by increasing the amount of fiber in your diet  Soluble fiber is a type of fiber that helps to decrease cholesterol levels  Different types of fat in food:   · Limit unhealthy fats  A diet that is high in cholesterol, saturated fat, and trans fat may cause unhealthy cholesterol levels  Unhealthy cholesterol levels increase your risk of heart disease  ¨ Cholesterol:  Limit intake of cholesterol to less than 200 mg per day   Cholesterol is found in meat, eggs, and dairy  ¨ Saturated fat:  Limit saturated fat to less than 7% of your total daily calories  Ask your dietitian how many calories you need each day  Saturated fat is found in butter, cheese, ice cream, whole milk, and palm oil  Saturated fat is also found in meat, such as beef, pork, chicken skin, and processed meats  Processed meats include sausage, hot dogs, and bologna  ¨ Trans fat:  Avoid trans fat as much as possible  Trans fat is used in fried and baked foods  Foods that say trans fat free on the label may still have up to 0 5 grams of trans fat per serving  · Include healthy fats  Replace foods that are high in saturated and trans fat with foods high in healthy fats  This may help to decrease high cholesterol levels  ¨ Monounsaturated fats: These are found in avocados, nuts, and vegetable oils, such as olive, canola, and sunflower oil  ¨ Polyunsaturated fats: These can be found in vegetable oils, such as soybean or corn oil  Omega-3 fats can help to decrease the risk of heart disease  Omega-3 fats are found in fish, such as salmon, herring, trout, and tuna  Omega-3 fats can also be found in plant foods, such as walnuts, flaxseed, soybeans, and canola oil    Foods to limit or avoid:   · Grains:      ¨ Snacks that are made with partially hydrogenated oils, such as chips, regular crackers, and butter-flavored popcorn    ¨ High-fat baked goods, such as biscuits, croissants, doughnuts, pies, cookies, and pastries    · Dairy:      ¨ Whole milk, 2% milk, and yogurt and ice cream made with whole milk    ¨ Half and half creamer, heavy cream, and whipping cream    ¨ Cheese, cream cheese, and sour cream    · Meats and proteins:      ¨ High-fat cuts of meat (T-bone steak, regular hamburger, and ribs)    ¨ Fried meat, poultry (turkey and chicken), and fish    ¨ Poultry (chicken and turkey) with skin    ¨ Cold cuts (salami or bologna), hot dogs, allen, and sausage    ¨ Whole eggs and egg yolks    · Vegetables and fruits with added fat:      ¨ Fried vegetables or vegetables in butter or high-fat sauces, such as cream or cheese sauces    ¨ Fried fruit or fruit served with butter or cream    · Fats:      ¨ Butter, stick margarine, and shortening    ¨ Coconut, palm oil, and palm kernel oil  Foods to include:   · Grains:      ¨ Whole-grain breads, cereals, pasta, and brown rice    ¨ Low-fat crackers and pretzels    · Vegetables and fruits:      ¨ Fresh, frozen, or canned vegetables (no salt or low-sodium)    ¨ Fresh, frozen, dried, or canned fruit (canned in light syrup or fruit juice)    ¨ Avocado    · Low-fat dairy products:      ¨ Nonfat (skim) or 1% milk    ¨ Nonfat or low-fat cheese, yogurt, and cottage cheese    · Meats and proteins:      ¨ Chicken or turkey with no skin    ¨ Baked or broiled fish    ¨ Lean beef and pork (loin, round, extra lean hamburger)    ¨ Beans and peas, unsalted nuts, soy products    ¨ Egg whites and substitutes    ¨ Seeds and nuts    · Fats:      ¨ Unsaturated oil, such as canola, olive, peanut, soybean, or sunflower oil    ¨ Soft or liquid margarine and vegetable oil spread    ¨ Low-fat salad dressing  Other ways to decrease fat:   · Read food labels before you buy foods  Choose foods that have less than 30% of calories from fat  Choose low-fat or fat-free dairy products  Remember that fat free does not mean calorie free  These foods still contain calories, and too many calories can lead to weight gain  · Trim fat from meat and avoid fried food  Trim all visible fat from meat before you cook it  Remove the skin from poultry  Do not phelps meat, fish, or poultry  Bake, roast, boil, or broil these foods instead  Avoid fried foods  Eat a baked potato instead of [place] fries  Steam vegetables instead of sautéing them in butter  · Add less fat to foods  Use imitation allen bits on salads and baked potatoes instead of regular allen bits   Use fat-free or low-fat salad dressings instead of regular dressings  Use low-fat or nonfat butter-flavored topping instead of regular butter or margarine on popcorn and other foods  Ways to decrease fat in recipes:  Replace high-fat ingredients with low-fat or nonfat ones  This may cause baked goods to be drier than usual  You may need to use nonfat cooking spray on pans to prevent food from sticking  You also may need to change the amount of other ingredients, such as water, in the recipe  Try the following:  · Use low-fat or light margarine instead of regular margarine or shortening  · Use lean ground turkey breast or chicken, or lean ground beef (less than 5% fat) instead of hamburger  · Add 1 teaspoon of canola oil to 8 ounces of skim milk instead of using cream or half and half  · Use grated zucchini, carrots, or apples in breads instead of coconut  · Use blenderized, low-fat cottage cheese, plain tofu, or low-fat ricotta cheese instead of cream cheese  · Use 1 egg white and 1 teaspoon of canola oil, or use ¼ cup (2 ounces) of fat-free egg substitute instead of a whole egg  · Replace half of the oil that is called for in a recipe with applesauce when you bake  Use 3 tablespoons of cocoa powder and 1 tablespoon of canola oil instead of a square of baking chocolate  How to increase fiber:  Eat enough high-fiber foods to get 20 to 30 grams of fiber every day  Slowly increase your fiber intake to avoid stomach cramps, gas, and other problems  · Eat 3 ounces of whole-grain foods each day  An ounce is about 1 slice of bread  Eat whole-grain breads, such as whole-wheat bread  Whole wheat, whole-wheat flour, or other whole grains should be listed as the first ingredient on the food label  Replace white flour with whole-grain flour or use half of each in recipes  Whole-grain flour is heavier than white flour, so you may have to add more yeast or baking powder       · Eat a high-fiber cereal for breakfast  Oatmeal is a good source of soluble fiber  Look for cereals that have bran or fiber in the name  Choose whole-grain products, such as brown rice, barley, and whole-wheat pasta  · Eat more beans, peas, and lentils  For example, add beans to soups or salads  Eat at least 5 cups of fruits and vegetables each day  Eat fruits and vegetables with the peel because the peel is high in fiber  © 2017 [employer] Information is for End User's use only and may not be sold, redistributed or otherwise used for commercial purposes  All illustrations and images included in CareNotes® are the copyrighted property of [employer]  or Chris Grady  The above information is an  only  It is not intended as medical advice for individual conditions or treatments  Talk to your doctor, nurse or pharmacist before following any medical regimen to see if it is safe and effective for you  Heart Healthy Diet   AMBULATORY CARE:   A heart healthy diet  is an eating plan low in total fat, unhealthy fats, and sodium (salt)  A heart healthy diet helps decrease your risk for heart disease and stroke  Limit the amount of fat you eat to 25% to 35% of your total daily calories  Limit sodium to less than 2,300 mg each day  Healthy fats:  Healthy fats can help improve cholesterol levels  The risk for heart disease is decreased when cholesterol levels are normal  Choose healthy fats, such as the following:  · Unsaturated fat  is found in foods such as soybean, canola, olive, corn, and safflower oils  It is also found in soft tub margarine that is made with liquid vegetable oil  · Omega-3 fat  is found in certain fish, such as salmon, tuna, and trout, and in walnuts and flaxseed  Unhealthy fats:  Unhealthy fats can cause unhealthy cholesterol levels in your blood and increase your risk of heart disease   Limit unhealthy fats, such as the following:  · Cholesterol  is found in animal foods, such as eggs and lobster, and in dairy products made from whole milk  Limit cholesterol to less than 300 milligrams (mg) each day  You may need to limit cholesterol to 200 mg each day if you have heart disease  · Saturated fat  is found in meats, such as allen and hamburger  It is also found in chicken or turkey skin, whole milk, and butter  Limit saturated fat to less than 7% of your total daily calories  Limit saturated fat to less than 6% if you have heart disease or are at increased risk for it  · Trans fat  is found in packaged foods, such as potato chips and cookies  It is also in hard margarine, some fried foods, and shortening  Avoid trans fats as much as possible    Heart healthy foods and drinks to include:  Ask your dietitian or healthcare provider how many servings to have from each of the following food groups:  · Grains:      ¨ Whole-wheat breads, cereals, and pastas, and brown rice    ¨ Low-fat, low-sodium crackers and chips    · Vegetables:      ¨ Broccoli, green beans, green peas, and spinach    ¨ Collards, kale, and lima beans    ¨ Carrots, sweet potatoes, tomatoes, and peppers    ¨ Canned vegetables with no salt added    · Fruits:      ¨ Bananas, peaches, pears, and pineapple    ¨ Grapes, raisins, and dates    ¨ Oranges, tangerines, grapefruit, orange juice, and grapefruit juice    ¨ Apricots, mangoes, melons, and papaya    ¨ Raspberries and strawberries    ¨ Canned fruit with no added sugar    · Low-fat dairy products:      ¨ Nonfat (skim) milk, 1% milk, and low-fat almond, cashew, or soy milks fortified with calcium    ¨ Low-fat cheese, regular or frozen yogurt, and cottage cheese    · Meats and proteins , such as lean cuts of beef and pork (loin, leg, round), skinless chicken and turkey, legumes, soy products, egg whites, and nuts  Foods and drinks to limit or avoid:  Ask your dietitian or healthcare provider about these and other foods that are high in unhealthy fat, sodium, and sugar:  · Snack or packaged foods , such as frozen dinners, cookies, macaroni and cheese, and cereals with more than 300 mg of sodium per serving    · Canned or dry mixes  for cakes, soups, sauces, or gravies    · Vegetables with added sodium , such as instant potatoes, vegetables with added sauces, or regular canned vegetables    · Other foods high in sodium , such as ketchup, barbecue sauce, salad dressing, pickles, olives, soy sauce, and miso    · High-fat dairy foods  such as whole or 2% milk, cream cheese, or sour cream, and cheeses     · High-fat protein foods  such as high-fat cuts of beef (T-bone steaks, ribs), chicken or turkey with skin, and organ meats, such as liver    · Cured or smoked meats , such as hot dogs, allen, and sausage    · Unhealthy fats and oils , such as butter, stick margarine, shortening, and cooking oils such as coconut or palm oil    · Food and drinks high in sugar , such as soft drinks (soda), sports drinks, sweetened tea, candy, cake, cookies, pies, and doughnuts  Other diet guidelines to follow:   · Eat more foods containing omega-3 fats  Eat fish high in omega-3 fats at least 2 times a week  · Limit alcohol  Too much alcohol can damage your heart and raise your blood pressure  Women should limit alcohol to 1 drink a day  Men should limit alcohol to 2 drinks a day  A drink of alcohol is 12 ounces of beer, 5 ounces of wine, or 1½ ounces of liquor  · Choose low-sodium foods  High-sodium foods can lead to high blood pressure  Add little or no salt to food you prepare  Use herbs and spices in place of salt  · Eat more fiber  to help lower cholesterol levels  Eat at least 5 servings of fruits and vegetables each day  Eat 3 ounces of whole-grain foods each day  Legumes (beans) are also a good source of fiber  Lifestyle guidelines:   · Do not smoke  Nicotine and other chemicals in cigarettes and cigars can cause lung and heart damage   Ask your healthcare provider for information if you currently smoke and need help to quit  E-cigarettes or smokeless tobacco still contain nicotine  Talk to your healthcare provider before you use these products  · Exercise regularly  to help you maintain a healthy weight and improve your blood pressure and cholesterol levels  Ask your healthcare provider about the best exercise plan for you  Do not start an exercise program without asking your healthcare provider  Follow up with your healthcare provider as directed:  Write down your questions so you remember to ask them during your visits  © 2017 [facility] Information is for End User's use only and may not be sold, redistributed or otherwise used for commercial purposes  All illustrations and images included in CareNotes® are the copyrighted property of [employer]  or [employer] Grady  The above information is an  only  It is not intended as medical advice for individual conditions or treatments  Talk to your doctor, nurse or pharmacist before following any medical regimen to see if it is safe and effective for you

## 2020-09-08 ENCOUNTER — SOCIAL WORK (OUTPATIENT)
Dept: BEHAVIORAL/MENTAL HEALTH CLINIC | Facility: CLINIC | Age: 55
End: 2020-09-08
Payer: MEDICARE

## 2020-09-08 DIAGNOSIS — Z63.4 BEREAVEMENT: ICD-10-CM

## 2020-09-08 DIAGNOSIS — F41.1 GENERALIZED ANXIETY DISORDER: ICD-10-CM

## 2020-09-08 DIAGNOSIS — G47.00 INSOMNIA, UNSPECIFIED TYPE: ICD-10-CM

## 2020-09-08 DIAGNOSIS — F41.0 PANIC ATTACKS: ICD-10-CM

## 2020-09-08 DIAGNOSIS — F31.61 BIPOLAR DISORDER, CURRENT EPISODE MIXED, MILD (HCC): Primary | ICD-10-CM

## 2020-09-08 PROCEDURE — 90834 PSYTX W PT 45 MINUTES: CPT | Performed by: SOCIAL WORKER

## 2020-09-08 SDOH — SOCIAL STABILITY - SOCIAL INSECURITY: DISSAPEARANCE AND DEATH OF FAMILY MEMBER: Z63.4

## 2020-09-08 NOTE — PSYCH
Treatment Plan Tracking    # [place]'s updatedTreatment Plan  not completed within required time limits due to he was so anxious about what is going on in the world that he needed to talk  : Client presented with emotional/behavorial issues that required clinical intervention  Will update next session  Sudhir Jacobson

## 2020-09-08 NOTE — PSYCH
Psychotherapy Provided: Individual Psychotherapy 45 minutes     Length of time in session: 45 minutes, follow up in 2 week    Goals addressed in session: Goal 1, Goal 2 and Goal 3      Pain:      moderate to severe    0    Current suicide risk : Low     Data: Rivka Koenig arrived for his face to face session  He discussed related to his first goal of improving his overall health he has an appointment on the 16th of this month at [facility] [employer] program  He shared he is quite excited about losing the weight  He discussed how due to world events and how due to the covid 19 and the fact his girlfriend's health has been very bad with an infected foot his overall anxiety has been quite high  We updated his plan and we are keeping the goals similar at his request  Assessment: Overall Rivka Koenig shared he is feeling better and a bump in his medications has helped  However he still wants to work on more effectively managing his anxiety  He denies suicidal and or homicidal ideation, plan or intent  We worked on mindfulness, distress tolerance and cognitive behavioral strategies  Plan: Will continue to help him progress on his goals  Behavioral Health Treatment Plan [facility]: Diagnosis and Treatment Plan explained to Marlene Huggins relates understanding diagnosis and is agreeable to Treatment Plan   Yes

## 2020-09-09 ENCOUNTER — TELEPHONE (OUTPATIENT)
Dept: PSYCHIATRY | Facility: CLINIC | Age: 55
End: 2020-09-09

## 2020-09-09 DIAGNOSIS — G47.00 INSOMNIA, UNSPECIFIED TYPE: ICD-10-CM

## 2020-09-09 DIAGNOSIS — F31.61 BIPOLAR DISORDER, CURRENT EPISODE MIXED, MILD (HCC): ICD-10-CM

## 2020-09-09 DIAGNOSIS — F41.1 GENERALIZED ANXIETY DISORDER: Chronic | ICD-10-CM

## 2020-09-09 RX ORDER — BUPROPION HYDROCHLORIDE 100 MG/1
100 TABLET ORAL DAILY
Qty: 90 TABLET | Refills: 0 | Status: SHIPPED | OUTPATIENT
Start: 2020-09-09 | End: 2020-10-01 | Stop reason: SDUPTHER

## 2020-09-09 RX ORDER — BUSPIRONE HYDROCHLORIDE 15 MG/1
15 TABLET ORAL 2 TIMES DAILY
Qty: 180 TABLET | Refills: 0 | Status: SHIPPED | OUTPATIENT
Start: 2020-09-09 | End: 2020-10-01 | Stop reason: SDUPTHER

## 2020-09-09 RX ORDER — TRAZODONE HYDROCHLORIDE 50 MG/1
50 TABLET ORAL
Qty: 90 TABLET | Refills: 0 | Status: SHIPPED | OUTPATIENT
Start: 2020-09-09 | End: 2020-10-01 | Stop reason: SDUPTHER

## 2020-09-09 NOTE — TELEPHONE ENCOUNTER
Ana Dumont returns for f/u 9/21/2020 and I e-scribed the following to his designated [facility]-- as requested by the pharmacy:  Buspirone to 15mg (1) tab po bid # 180   Bupropion 100mg (1) tab po qAM # 90  Trazodone 50mg (1) tab po qhs prn insomnia # 90

## 2020-09-09 NOTE — TELEPHONE ENCOUNTER
Peder Shallow from [address] called and LVM requesting refills of Bupropion 100 mg, Buspirone 15 mg and Trazadone 50 mg

## 2020-09-11 DIAGNOSIS — Z79.4 TYPE 2 DIABETES MELLITUS WITH HYPERGLYCEMIA, WITH LONG-TERM CURRENT USE OF INSULIN (HCC): ICD-10-CM

## 2020-09-11 DIAGNOSIS — E11.65 TYPE 2 DIABETES MELLITUS WITH HYPERGLYCEMIA, WITH LONG-TERM CURRENT USE OF INSULIN (HCC): ICD-10-CM

## 2020-09-11 RX ORDER — DULAGLUTIDE 1.5 MG/.5ML
INJECTION, SOLUTION SUBCUTANEOUS
Qty: 2 ML | Refills: 3 | Status: SHIPPED | OUTPATIENT
Start: 2020-09-11 | End: 2020-12-29 | Stop reason: SDUPTHER

## 2020-09-16 ENCOUNTER — CONSULT (OUTPATIENT)
Dept: BARIATRICS | Facility: CLINIC | Age: 55
End: 2020-09-16
Payer: MEDICARE

## 2020-09-16 VITALS
BODY MASS INDEX: 50.62 KG/M2 | TEMPERATURE: 97.1 F | WEIGHT: 315 LBS | RESPIRATION RATE: 18 BRPM | DIASTOLIC BLOOD PRESSURE: 76 MMHG | HEIGHT: 66 IN | SYSTOLIC BLOOD PRESSURE: 129 MMHG | HEART RATE: 97 BPM

## 2020-09-16 DIAGNOSIS — Z79.4 TYPE 2 DIABETES MELLITUS WITH HYPERGLYCEMIA, WITH LONG-TERM CURRENT USE OF INSULIN (HCC): ICD-10-CM

## 2020-09-16 DIAGNOSIS — K21.9 GERD (GASTROESOPHAGEAL REFLUX DISEASE): ICD-10-CM

## 2020-09-16 DIAGNOSIS — F31.60 BIPOLAR AFFECTIVE DISORDER, CURRENT EPISODE MIXED (HCC): ICD-10-CM

## 2020-09-16 DIAGNOSIS — G47.33 OSA (OBSTRUCTIVE SLEEP APNEA): ICD-10-CM

## 2020-09-16 DIAGNOSIS — E66.01 MORBID OBESITY WITH BMI OF 50.0-59.9, ADULT (HCC): ICD-10-CM

## 2020-09-16 DIAGNOSIS — E11.65 TYPE 2 DIABETES MELLITUS WITH HYPERGLYCEMIA, WITH LONG-TERM CURRENT USE OF INSULIN (HCC): ICD-10-CM

## 2020-09-16 DIAGNOSIS — I12.9 BENIGN HYPERTENSION WITH CKD (CHRONIC KIDNEY DISEASE) STAGE III (HCC): ICD-10-CM

## 2020-09-16 DIAGNOSIS — E78.5 HYPERLIPIDEMIA: Primary | ICD-10-CM

## 2020-09-16 DIAGNOSIS — N18.30 BENIGN HYPERTENSION WITH CKD (CHRONIC KIDNEY DISEASE) STAGE III (HCC): ICD-10-CM

## 2020-09-16 PROCEDURE — 99204 OFFICE O/P NEW MOD 45 MIN: CPT | Performed by: PHYSICIAN ASSISTANT

## 2020-09-16 NOTE — ASSESSMENT & PLAN NOTE
-Discussed options of [facility]-Intensive Lifestyle Intervention Program, Very Low Calorie Diet-VLCD, Conservative Program, Alex-En-Y Gastric Bypass and Vertical Sleeve Gastrectomy and the role of weight loss medications   -Initial weight loss goal of 5-10% weight loss for improved health  -Screening labs   Recommend checking lab coverage before having labs drawn   -lipids, a1c, cmp reviewed from DATE all within acceptable limits except for elevated a1c and decreased kidney function   -Patient is interested in pursuing surgery  -not a vlcd candidate due to eGFR

## 2020-09-16 NOTE — ASSESSMENT & PLAN NOTE
Taking norvasc, prinzide  -should improve with weight loss, dietary, and lifestyle changes  -continue management with prescribing provider

## 2020-09-16 NOTE — PROGRESS NOTES
Assessment/Plan: Morbid obesity with BMI of 50 0-59 9, adult ([facility] )  -Discussed options of HealthyCORE-Intensive Lifestyle Intervention Program, Very Low Calorie Diet-VLCD, Conservative Program, Alex-En-Y Gastric Bypass and Vertical Sleeve Gastrectomy and the role of weight loss medications   -Initial weight loss goal of 5-10% weight loss for improved health  -Screening labs   Recommend checking lab coverage before having labs drawn   -lipids, a1c, cmp reviewed from DATE all within acceptable limits except for elevated a1c and decreased kidney function   -Patient is interested in pursuing surgery  -not a vlcd candidate due to eGFR    Hyperlipidemia  Taking lipitor  -should improve with weight loss, dietary, and lifestyle changes  -continue management with prescribing provider      Bipolar affective disorder, current episode mixed ([place] [address] )  Taking wellbutrin, buspar, klonopin, latuda, trazodone  -continue management with prescribing provider      Benign hypertension with CKD (chronic kidney disease) stage III (HCC)  Taking norvasc, prinzide  -should improve with weight loss, dietary, and lifestyle changes  -continue management with prescribing provider      DAISY (obstructive sleep apnea)  -encouraged continued use of CPAP machine  -may improve with 20-30% weight loss      Type 2 diabetes mellitus with hyperglycemia, with long-term current use of insulin (HCC)  Taking humalog, lantus, trulicity  -should improve with weight loss, dietary, and lifestyle changes  -continue management with prescribing provider    Lab Results   Component Value Date    HGBA1C 10 4 (H) 06/03/2020       GERD (gastroesophageal reflux disease)  Taking Prilosec  -should improve with weight loss, dietary, and lifestyle changes  -continue management with prescribing provider      Diagnoses and all orders for this visit:    Hyperlipidemia    Morbid obesity with BMI of 50 0-59 9, adult ([place] [address] )  -     Ambulatory referral to Weight Management    Bipolar affective disorder, current episode mixed ([facility] )    Benign hypertension with CKD (chronic kidney disease) stage III (HCC)    DAISY (obstructive sleep apnea)    Type 2 diabetes mellitus with hyperglycemia, with long-term current use of insulin ([facility])    GERD (gastroesophageal reflux disease)          Subjective:   Chief Complaint   Patient presents with    Consult     MWM consult  has DAISY, uses a CPAP       Patient ID: Jhon Kussmaul  is a 48 y o  male with excess weight/obesity here to pursue weight management  Past Medical History:   Diagnosis Date    ADHD, adult residual type     Cataract     Left eye    Depression     Diabetes mellitus (HCC)     blood sugar 167 on admission    Equinus deformity of foot     GERD (gastroesophageal reflux disease)     Homicidal ideations     Hyperlipidemia     Hypertension     Microalbuminuria     Morbid obesity (HCC)     Neuropathy     Sleep apnea     CPAP at bedtime    Suicidal intent        HPI:  Obesity/Excess Weight:  Severity: Severe  Onset: since childhood     Modifiers: Diet and Exercise  Contributing factors: portion size   Associated symptoms: comorbid conditions, fatigue and increased shortness of breath    Goals:300 lbs   Hydration: 36 oz of water, 2 cups of iced tea, 1 cup of soda, 1 bottle power aide    Alcohol: none     Colonoscopy-Completed per patient done last year     The following portions of the patient's history were reviewed and updated as appropriate: allergies, current medications, past family history, past medical history, past social history, past surgical history and problem list     Review of Systems   HENT: Negative for sore throat  Respiratory: Negative for cough and shortness of breath  Cardiovascular: Negative for chest pain and palpitations  Gastrointestinal: Negative for abdominal pain, constipation, diarrhea, nausea and vomiting         + GERD--controlled    Endocrine: Negative for cold intolerance and heat intolerance  Genitourinary: Negative for dysuria  Musculoskeletal: Negative for arthralgias and back pain  Skin: Negative for rash  Neurological: Negative for headaches  Psychiatric/Behavioral: Negative for suicidal ideas (denies HI)  +  Depression and anxiety--controlled with meds        Objective:    /76 (BP Location: Left arm, Patient Position: Sitting, Cuff Size: Adult)   Pulse 97   Temp (!) 97 1 °F (36 2 °C)   Resp 18   Ht 5' 5 9" (1 674 m)   Wt (!) 154 kg (338 lb 9 6 oz)   BMI 54 82 kg/m²     Physical Exam  Vitals signs and nursing note reviewed  Constitutional   General appearance: Abnormal   well developed and morbidly obese  Eyes No conjunctival pallor  Ears, Nose, Mouth, and Throat Oral mucosa moist    Pulmonary   Respiratory effort: No increased work of breathing or signs of respiratory distress  Auscultation of lungs: Clear to auscultation, equal breath sounds bilaterally, no wheezes, no rales, no rhonci  Cardiovascular   Auscultation of heart: Normal rate and rhythm, normal S1 and S2, without murmurs  Examination of extremities for edema and/or varicosities: Normal   no edema  Abdomen   Abdomen: Abnormal   The abdomen was obese  Bowel sounds were normal  The abdomen was soft and nontender     Musculoskeletal   Gait and station: Normal     Psychiatric   Orientation to person, place and time: Normal     Affect: appropriate

## 2020-09-16 NOTE — ASSESSMENT & PLAN NOTE
Taking humalog, lantus, trulicity  -should improve with weight loss, dietary, and lifestyle changes  -continue management with prescribing provider    Lab Results   Component Value Date    HGBA1C 10 4 (H) 06/03/2020

## 2020-09-16 NOTE — ASSESSMENT & PLAN NOTE
Taking wellbutrin, buspar, klonopin, latuda, trazodone  -continue management with prescribing provider

## 2020-09-16 NOTE — ASSESSMENT & PLAN NOTE
Taking Prilosec  -should improve with weight loss, dietary, and lifestyle changes  -continue management with prescribing provider

## 2020-09-25 ENCOUNTER — OFFICE VISIT (OUTPATIENT)
Dept: SLEEP CENTER | Facility: CLINIC | Age: 55
End: 2020-09-25
Payer: MEDICARE

## 2020-09-25 VITALS
SYSTOLIC BLOOD PRESSURE: 114 MMHG | HEART RATE: 88 BPM | DIASTOLIC BLOOD PRESSURE: 68 MMHG | BODY MASS INDEX: 50.62 KG/M2 | HEIGHT: 66 IN | WEIGHT: 315 LBS

## 2020-09-25 DIAGNOSIS — G47.33 OSA (OBSTRUCTIVE SLEEP APNEA): Primary | ICD-10-CM

## 2020-09-25 PROCEDURE — 99213 OFFICE O/P EST LOW 20 MIN: CPT | Performed by: INTERNAL MEDICINE

## 2020-09-25 NOTE — PROGRESS NOTES
Progress Note - Sleep Center   Tatiana Littlejohn :1965 MRN: [id number]      Reason for Visit:  48 y o male here for annual follow-up    Assessment:  Doing well on current therapy of APAP 11-17 cm for very severe DAISY (AHI=60 0)  Plan:  Continue same    Follow up: One year    History of Present Illness:  History of DAISY on PAP therapy  Fully compliant and deriving benefit  Review of Systems      Genitourinary difficulty with erection and none   Cardiology none   Gastrointestinal none   Neurology none   Constitutional none   Integumentary rash or dry skin   Psychiatry anxiety and depression   Musculoskeletal none   Pulmonary snoring   ENT none   Endocrine none   Hematological none         I have reviewed and updated the review of systems as necessary      Historical Information    Past Medical History:   Past Medical History:   Diagnosis Date    ADHD, adult residual type     Anxiety     Bipolar 1 disorder (HCC)     Cataract     Left eye    Depression     Diabetes mellitus ([address] )     blood sugar 167 on admission    Equinus deformity of foot     GERD (gastroesophageal reflux disease)     Homicidal ideations     Hyperlipidemia     Hypertension     Microalbuminuria     Morbid obesity (HCC)     Neuropathy     Shortness of breath     Sleep apnea     CPAP at bedtime    Stroke ([facility] [address] )     Suicidal intent          Past Surgical History:   Past Surgical History:   Procedure Laterality Date    CATARACT EXTRACTION      HAND SURGERY Right     OTHER SURGICAL HISTORY      REPAIR OF SUPERFICIAL WOUND FACE    OK ESOPHAGOGASTRODUODENOSCOPY TRANSORAL DIAGNOSTIC N/A 2018    Procedure: ESOPHAGOGASTRODUODENOSCOPY (EGD); Surgeon: Tommie Holstein, MD;  Location: [facility]; Service: Gastroenterology    OK ESOPHAGOGASTRODUODENOSCOPY TRANSORAL DIAGNOSTIC N/A 2018    Procedure: EGD AND COLONOSCOPY;  Surgeon: Tommie Holstein, MD;  Location: [facility];   Service: Gastroenterology       Social History:   Social History     Socioeconomic History    Marital status: /Civil Union     Spouse name: None    Number of children: 1    Years of education: None    Highest education level: None   Occupational History    Occupation: On disability   Social Needs    Financial resource strain: Not hard at all   [place] insecurity     Worry: Never true     Inability: Never true    Transportation needs     Medical: No     Non-medical: No   Tobacco Use    Smoking status: Former Smoker     Types: Cigarettes     Last attempt to quit: 1985     Years since quittin 7    Smokeless tobacco: Former User     Types: Chew    Tobacco comment: pt "Quit after approx over 25 years ago"   Substance and Sexual Activity    Alcohol use: Yes     Frequency: Monthly or less     Drinks per session: 1 or 2     Binge frequency: Never     Comment: rarely    Drug use: Never     Comment: quit 20 years ago    Sexual activity: Yes     Partners: Female     Birth control/protection: None     Comment: steady girlfriend   Lifestyle    Physical activity     Days per week: 0 days     Minutes per session: 0 min    Stress: Not at all   Relationships    Social connections     Talks on phone: Twice a week     Gets together: Never     Attends Confucianist service: Never     Active member of club or organization: No     Attends meetings of clubs or organizations: Never     Relationship status: Living with partner    Intimate partner violence     Fear of current or ex partner: Patient refused     Emotionally abused: Patient refused     Physically abused: Patient refused     Forced sexual activity: Patient refused   Other Topics Concern    None   Social History Narrative     from spouse, technically still  but living with other partner, partner's father, and early 2019, his partner's daughter and boyfriend moved in with their two children  Then the boyfriend moved out in 2019            Children: 1 stepdaughter         Education:    Pt denies any hx of learning disabilities and reached childhood milestones on time as far as he knows  He wore braces for equinovarus but states he did not suffer delay in walking    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 because "I was just lazy, didn't do the work "    No college    Took some core classes in [employer] and worked as a volunteer  from approx [phone]             Substance Abuse History:     Pt drinks ETOH approx q 3-4 months but denies any h/o ETOH abuse  Pt tried smoking Crack once in the past and has been smoking THC once q 2-3 months since 14y/o  He denies other drug use, IVDA, addictions or drug abuse             Family History:   Family History   Problem Relation Age of Onset    Diabetes Mother     Alcohol abuse Father     Diabetes Father     Hypertension Father     Lung cancer Father     Stroke Father     Cancer Father     Alcohol abuse Sister     Depression Sister     Lymphoma Sister     Alcohol abuse Family     Alcohol abuse Sister     Alcohol abuse Sister     Cancer Sister     Heart disease Neg Hx     Thyroid disease Neg Hx        Medications/Allergies:      Current Outpatient Medications:     amLODIPine (NORVASC) 5 mg tablet, TAKE 1 TABLET(5 MG) BY MOUTH DAILY, Disp: 90 tablet, Rfl: 1    atorvastatin (LIPITOR) 10 mg tablet, TAKE 1 TABLET(10 MG) BY MOUTH DAILY, Disp: 90 tablet, Rfl: 1    Blood Glucose Monitoring Suppl (FREESTYLE LITE) ANTONIA, by Does not apply route 3 (three) times a day, Disp: 1 each, Rfl: 0    buPROPion (WELLBUTRIN) 100 mg tablet, Take 1 tablet (100 mg total) by mouth daily, Disp: 90 tablet, Rfl: 0    busPIRone (BUSPAR) 15 mg tablet, Take 1 tablet (15 mg total) by mouth 2 (two) times a day, Disp: 180 tablet, Rfl: 0    clonazePAM (KlonoPIN) 1 mg tablet, Take 1 tablet (1 mg total) by mouth daily as needed for anxiety (or panic attacks), Disp: 30 tablet, Rfl: 2    gabapentin (NEURONTIN) 300 mg capsule, TAKE 1 CAPSULE(300 MG) BY MOUTH TWICE DAILY, Disp: 180 capsule, Rfl: 1    glucose blood (FREESTYLE LITE) test strip, Check BG 4 times a day, Disp: 400 each, Rfl: 1    insulin lispro (HumaLOG) 100 units/mL injection pen, Take 28 units TID with meals plus scale (up to 110 units daily), Disp: 30 pen, Rfl: 1    Insulin Pen Needle (B-D ULTRAFINE III SHORT PEN) 31G X 8 MM MISC, by Does not apply route, Disp: , Rfl:     INSULIN SYRINGE  5CC/29G 29G X 1/2" 0 5 ML MISC, by Does not apply route, Disp: , Rfl:     Lancets (FREESTYLE) lancets, USE THREE TIMES DAILY AS DIRECTED, Disp: 300 each, Rfl: 0    LANTUS SOLOSTAR 100 units/mL injection pen, INJECT 50 UNITS UNDER THE SKIN EVERY 12 HOURS, Disp: 5 pen, Rfl: 1    lisinopril-hydrochlorothiazide (PRINZIDE,ZESTORETIC) 20-25 MG per tablet, TAKE 1 TABLET BY MOUTH DAILY, Disp: 90 tablet, Rfl: 1    lurasidone (Latuda) 120 mg tablet, Take 1 tablet (120 mg total) by mouth daily with breakfast, Disp: 90 tablet, Rfl: 0    omeprazole (PriLOSEC) 20 mg delayed release capsule, TAKE 1 CAPSULE(20 MG) BY MOUTH DAILY  DAYS, Disp: 90 capsule, Rfl: 1    traZODone (DESYREL) 50 mg tablet, Take 1 tablet (50 mg total) by mouth daily at bedtime as needed for sleep, Disp: 90 tablet, Rfl: 0    Trulicity 1 5 CELESTINE/0 7MG SOPN, ADMINISTER 0 5 ML(1 5 MG) UNDER THE SKIN 1 TIME A WEEK, Disp: 2 mL, Rfl: 3          Objective      Vital Signs:   Vitals:    09/25/20 0900   BP: 114/68   Pulse: 88     [place] Sleepiness Scale: Total score: 0        Physical Exam:    General: Alert, appropriate, cooperative, overweight    Head: NC/AT    Skin: Warm, dry    Neuro: No motor abnormalities, cranial nerves appear intact    Extremity: No clubbing, cyanosis      DME Provider: [employer]        Counseling / Coordination of Care   I have spent 10 minutes with the patient today in which greater than 50% of this time was spent in counseling/coordination of care regarding: equipment and compliance                Board Certified Sleep Specialist    Portions of the record may have been created with voice recognition software  Occasional wrong word or "sound a like" substitutions may have occurred due to the inherent limitations of voice recognition software  Read the chart carefully and recognize, using context, where substitutions have occurred

## 2020-09-29 ENCOUNTER — TELEPHONE (OUTPATIENT)
Dept: BARIATRICS | Facility: CLINIC | Age: 55
End: 2020-09-29

## 2020-09-29 ENCOUNTER — CLINICAL SUPPORT (OUTPATIENT)
Dept: BARIATRICS | Facility: CLINIC | Age: 55
End: 2020-09-29

## 2020-09-29 VITALS
TEMPERATURE: 97.9 F | BODY MASS INDEX: 45.1 KG/M2 | HEIGHT: 70 IN | DIASTOLIC BLOOD PRESSURE: 82 MMHG | SYSTOLIC BLOOD PRESSURE: 124 MMHG | RESPIRATION RATE: 22 BRPM | WEIGHT: 315 LBS | HEART RATE: 108 BPM

## 2020-09-29 DIAGNOSIS — E11.65 TYPE 2 DIABETES MELLITUS WITH HYPERGLYCEMIA, WITH LONG-TERM CURRENT USE OF INSULIN (HCC): ICD-10-CM

## 2020-09-29 DIAGNOSIS — E66.01 MORBID OBESITY (HCC): Primary | ICD-10-CM

## 2020-09-29 DIAGNOSIS — Z79.4 TYPE 2 DIABETES MELLITUS WITH HYPERGLYCEMIA, WITH LONG-TERM CURRENT USE OF INSULIN (HCC): ICD-10-CM

## 2020-09-29 DIAGNOSIS — E66.01 MORBID OBESITY WITH BMI OF 50.0-59.9, ADULT (HCC): Primary | ICD-10-CM

## 2020-09-29 DIAGNOSIS — G47.33 OSA (OBSTRUCTIVE SLEEP APNEA): ICD-10-CM

## 2020-09-29 PROCEDURE — RECHECK: Performed by: DIETITIAN, REGISTERED

## 2020-09-29 NOTE — TELEPHONE ENCOUNTER
I called patient left a message for patient his SW is requesting a clearance so at this time we will be cancelling his consultation with Dr Kelsi Barber and reschedule it once he has his clearance

## 2020-09-29 NOTE — PROGRESS NOTES
Bariatric Nutrition Assessment Note    Type of surgery    Preop  Surgery Date: TBD  Surgeon: Dr Subha Miranda  48 y o   male     Wt with BMI of 25: 153 7#  Pre-Op Excess Wt: 183 3#  /82 (BP Location: Right arm, Patient Position: Sitting, Cuff Size: Standard)   Pulse (!) 108   Temp 97 9 °F (36 6 °C) (Tympanic)   Resp 22   Ht 5' 9 5" (1 765 m)   Wt (!) 153 kg (337 lb)   BMI 49 05 kg/m²     [place]  [place] Equation: At sedentary activity level: For weight maintenance: 2886 kcal/day  For 1-2 lb/wk wt loss: 5934-7738 kcal/day  Protein: 0 8-1 0 g/kg IBW= 55 9-69 9g/day    Weight History   Onset of Obesity: Childhood  Family history of obesity: Yes: father's side only  Wt Loss Attempts: Exercise  Patient has tried the above for 6 months or more with insufficient weight loss or weight regain, which is why patient has requested to be evaluated for weight loss surgery today  Maximum Wt Lost: 100+ with exercise    Review of History and Medications   Past Medical History:   Diagnosis Date    ADHD, adult residual type     Anxiety     Anxiety     Bipolar 1 disorder ([address] )     Cataract     Left eye    Depression     Depression     Diabetes mellitus ([address] )     blood sugar 167 on admission    Equinus deformity of foot     GERD (gastroesophageal reflux disease)     Homicidal ideations     Hyperlipidemia     Hypertension     Microalbuminuria     Morbid obesity (HCC)     Neuropathy     Shortness of breath     Sleep apnea     CPAP at bedtime    Sleep apnea     Stroke ([address] )     Suicidal intent      Past Surgical History:   Procedure Laterality Date    CATARACT EXTRACTION      CATARACT EXTRACTION      HAND SURGERY Right     OTHER SURGICAL HISTORY      REPAIR OF SUPERFICIAL WOUND FACE    AR ESOPHAGOGASTRODUODENOSCOPY TRANSORAL DIAGNOSTIC N/A 6/14/2018    Procedure: ESOPHAGOGASTRODUODENOSCOPY (EGD);   Surgeon: Kahlil Balderas MD;  Location: [facility]; Service: Gastroenterology    AR ESOPHAGOGASTRODUODENOSCOPY TRANSORAL DIAGNOSTIC N/A 2018    Procedure: EGD AND COLONOSCOPY;  Surgeon: Annie Bautista MD;  Location: [facility];   Service: Gastroenterology     Social History     Socioeconomic History    Marital status: /Civil Union     Spouse name: None    Number of children: 1    Years of education: None    Highest education level: None   Occupational History    Occupation: On disability   Social Needs    Financial resource strain: Not hard at all   [place] insecurity     Worry: Never true     Inability: Never true    Transportation needs     Medical: No     Non-medical: No   Tobacco Use    Smoking status: Former Smoker     Types: Cigarettes     Last attempt to quit:      Years since quittin 7    Smokeless tobacco: Former User     Types: Chew    Tobacco comment: pt "Quit after approx over 25 years ago"   Substance and Sexual Activity    Alcohol use: Yes     Frequency: Monthly or less     Drinks per session: 1 or 2     Binge frequency: Never     Comment: rarely    Drug use: Not Currently     Comment: quit 20 years ago    Sexual activity: Yes     Partners: Female     Birth control/protection: None     Comment: steady girlfriend   Lifestyle    Physical activity     Days per week: 0 days     Minutes per session: 0 min    Stress: Not at all   Relationships    Social connections     Talks on phone: Twice a week     Gets together: Never     Attends [employer] service: Never     Active member of club or organization: No     Attends meetings of clubs or organizations: Never     Relationship status: Living with partner    Intimate partner violence     Fear of current or ex partner: Patient refused     Emotionally abused: Patient refused     Physically abused: Patient refused     Forced sexual activity: Patient refused   Other Topics Concern    None   Social History Narrative     from spouse, technically still  but living with other partner, partner's father, and early 9/2019, his partner's daughter and boyfriend moved in with their two children  Then the boyfriend moved out in 9/2019  Children: 1 stepdaughter         Education:    Pt denies any hx of learning disabilities and reached childhood milestones on time as far as he knows  He wore braces for equinovarus but states he did not suffer delay in walking    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 because "I was just lazy, didn't do the work "    No college    Took some core classes in [place] and worked as a volunteer  from approx [id number]             Substance Abuse History:     Pt drinks ETOH approx q 3-4 months but denies any h/o ETOH abuse  Pt tried smoking Crack once in the past and has been smoking THC once q 2-3 months since 12y/o  He denies other drug use, IVDA, addictions or drug abuse             Current Outpatient Medications:     amLODIPine (NORVASC) 5 mg tablet, TAKE 1 TABLET(5 MG) BY MOUTH DAILY, Disp: 90 tablet, Rfl: 1    atorvastatin (LIPITOR) 10 mg tablet, TAKE 1 TABLET(10 MG) BY MOUTH DAILY, Disp: 90 tablet, Rfl: 1    Blood Glucose Monitoring Suppl (FREESTYLE LITE) ANTONIA, by Does not apply route 3 (three) times a day, Disp: 1 each, Rfl: 0    buPROPion (WELLBUTRIN) 100 mg tablet, Take 1 tablet (100 mg total) by mouth daily, Disp: 90 tablet, Rfl: 0    busPIRone (BUSPAR) 15 mg tablet, Take 1 tablet (15 mg total) by mouth 2 (two) times a day, Disp: 180 tablet, Rfl: 0    clonazePAM (KlonoPIN) 1 mg tablet, Take 1 tablet (1 mg total) by mouth daily as needed for anxiety (or panic attacks), Disp: 30 tablet, Rfl: 2    gabapentin (NEURONTIN) 300 mg capsule, TAKE 1 CAPSULE(300 MG) BY MOUTH TWICE DAILY, Disp: 180 capsule, Rfl: 1    glucose blood (FREESTYLE LITE) test strip, Check BG 4 times a day, Disp: 400 each, Rfl: 1    insulin lispro (HumaLOG) 100 units/mL injection pen, Take 28 units TID with meals plus scale (up to 110 units daily), Disp: 30 pen, Rfl: 1    Insulin Pen Needle (B-D ULTRAFINE III SHORT PEN) 31G X 8 MM MISC, by Does not apply route, Disp: , Rfl:     INSULIN SYRINGE  5CC/29G 29G X 1/2" 0 5 ML MISC, by Does not apply route, Disp: , Rfl:     Lancets (FREESTYLE) lancets, USE THREE TIMES DAILY AS DIRECTED, Disp: 300 each, Rfl: 0    LANTUS SOLOSTAR 100 units/mL injection pen, INJECT 50 UNITS UNDER THE SKIN EVERY 12 HOURS, Disp: 5 pen, Rfl: 1    lisinopril-hydrochlorothiazide (PRINZIDE,ZESTORETIC) 20-25 MG per tablet, TAKE 1 TABLET BY MOUTH DAILY, Disp: 90 tablet, Rfl: 1    omeprazole (PriLOSEC) 20 mg delayed release capsule, TAKE 1 CAPSULE(20 MG) BY MOUTH DAILY  DAYS, Disp: 90 capsule, Rfl: 1    traZODone (DESYREL) 50 mg tablet, Take 1 tablet (50 mg total) by mouth daily at bedtime as needed for sleep, Disp: 90 tablet, Rfl: 0    Trulicity 1 5 RY/3 7IF SOPN, ADMINISTER 0 5 ML(1 5 MG) UNDER THE SKIN 1 TIME A WEEK, Disp: 2 mL, Rfl: 3    lurasidone (Latuda) 120 mg tablet, Take 1 tablet (120 mg total) by mouth daily with breakfast, Disp: 90 tablet, Rfl: 0  Food Intake and Lifestyle Assessment   Food Intake Assessment completed via usual diet recall  Breakfast: none  Drinks water with meds  Snack: none   Lunch: burger salvador or [employer] or [employer] or [employer]: whopper with cheese  Snack: iced tea  Dinner: cube steak with buttered noodles and apple sauce  Snack: ice cream or brownie or pack of crackers  Beverage intake: sweetened iced tea, water  Protein supplement: none  Estimated protein intake per day: 30-60g  Estimated fluid intake per day: 100+ oz total water and tea  Meals eaten away from home: every lunch meal  Typical meal pattern: 2 meals per day and 1 snacks per day  Eating Behaviors: Consumption of high calorie/ high fat foods, Consumption of high calorie beverages and Large portion sizes  Food allergies or intolerances:    Allergies   Allergen Reactions    Pollen Extract Nasal Congestion    Tetanus Toxoid Swelling     Cultural or Nondenominational considerations: none    Physical Assessment  Physical Activity  Types of exercise: None  Current physical limitations: none    Psychosocial Assessment   Support systems: significant other  Socioeconomic factors: great niece and two grandchildren at home  Significant other recnetly had bone in foot removed    $152/month food stamps for girlfriend and niece    Nutrition Diagnosis  Diagnosis: Overweight / Obesity (NC-3 3), Excessive energy intake (NI-1 5), Excessive fat intake (NI-5 6 2) and Undesirable food choices (NB-1 7)  Related to: Physical inactivity and Excessive energy intake  As Evidenced by: BMI >25, Excessive energy intake, Excessive fat / cholesterol intake and Unintentional weight gain     Nutrition Prescription: Recommend the following diet  Regular    Interventions and Teaching   Discussed pre-op and post-op nutrition guidelines  Patient educated and handouts provided    Surgical changes to stomach / GI  Capacity of post-surgery stomach  Diet progression  Adequate hydration  Sugar and fat restriction to decrease "dumping syndrome"  Fat restriction to decrease steatorrhea  Expected weight loss  Weight loss plateaus/ possibility of weight regain  Exercise  Suggestions for pre-op diet  Nutrition considerations after surgery  Protein supplements  Meal planning and preparation  Appropriate carbohydrate, protein, and fat intake, and food/fluid choices to maximize safe weight loss, nutrient intake, and tolerance   Dietary and lifestyle changes  Possible problems with poor eating habits  Techniques for self monitoring and keeping daily food journal  Potential for food intolerance after surgery, and ways to deal with them including: lactose intolerance, nausea, reflux, vomiting, diarrhea, food intolerance, appetite changes, gas  Vitamin / Mineral supplementation of Multivitamin with minerals and Vitamin D    Education provided to: patient  Barriers to learning: No barriers identified  Readiness to change: preparation  Prior research on procedure: discussed with provider  Comprehension: needs reinforcement and verbalizes understanding   Expected Compliance: good    Recommendations  Pt is an appropriate candidate for surgery   Yes    Evaluation / Monitoring  Dietitian to Monitor: Eating pattern as discussed Tolerance of nutrition prescription Body weight Lab values Physical activity Bowel pattern    Goals  Eliminate sugar sweetened beverages, Food journal, Exercise 30 minutes 5 times per week, Complete lession plans 1-6, Eat 3 meals per day and Eliminate mindless snacking    Time Spent:   1 Hour

## 2020-09-29 NOTE — PROGRESS NOTES
Bariatric Behavioral Health Evaluation    Presenting Problem:  Tatiana Littlejohn  is a 48 y o    male    :  1965   Patient presented with overall concerns of obesity  Stated that weight has impacted quality of life and concerned with lack of mobility, chronic pain, and overall health  Has attempted various weight loss plans in the past including exercise with success of 100# weight loss  Patient is Interested in exploring bariatric surgery  as an option for  weight loss goals  Is the patient seeking Bariatric Surgery Eval? Yes    Has thought about bariatric surgery for quiet some time and PCP just brought the topic up again  Patient through he would explore more  Realizes Post- Op Requirements? Yes     Stated the RD reviewed everything and it made sense  Pre-morbid level of function and history of present illness: patient has struggled with weight since childhood  Many attempts at weight loss with limited success long term  Current kidney disease,  DAISY  and limited movement, GERD, 2DM     Psychiatric/Psychological Treatment Diagnosis: Anxiety:  BiPolar  With Rx :  Medication compliant     Outpatient Counselor Yes  Counselor  Cait Theodore LCSW   for that last three years  Psychiatrist Yes  Ethan Hurley PA-C:    [facility];  [address]:   Psychiatrist Phone [phone]   Fax; [phone]    Have you had Inpatient Treatment? Yes    Approximately five years ago:    Can not recall the reason for admission:  Was self admitted  Completed treatment and was discharged  Drug and Alcohol treatment:  Denied:     Tobacco Use:  Current:   Chew       Family Constellation (include relationship with each and Psych/Med HX)    Father  obesity and history of addiction and Siblings  history of addiction, tobacco use and mental health illness     Domestic Violence No    Abuse History:  none      Additional comments/stressors related to family/relationships/peer support:   Two living sister, no relationship  One  sister  Father is living , no relationship due to step mother  jayy Salazar  (resides with patient  )  Seema Klein, great niece (resides with patient )  Cooking is shared  On disability:  Does not really like to cook  Will eat out for lunch daily  Typical day:  Doctor's appointments, sitting around the house, chores  TV eating  Stressors: Marie's father  Physical/Psychological Assessment:     Appearance: appropriate  Sociability: average  Affect: appropriate  Mood: calm  Thought Process: coherent  Speech: normal  Content: no impairment  Orientation: person  Yes , place  Yes , time  Yes , normal attention span  Yes , normal memory  Yes   and normal judgement  Yes   Insight: emotional  good    Risk Assessment:     none    Recommendations: Decision for surgery deferred:  Psychiatric evaluation requested    Risk of Harm to Self or Others:     Observation:     Interviews : this interview only     Access to weapons yes     Weapons secured by : safe     Based on the previous information, the client presents the following risk of harm to self or others: low     Note :  Patient presented for [address] [place] [address] for Bariatric surgery  Mental Health diagnosis of Depression, Anxiety, BiPolar  [facility] psychiatry and counselor  One inpatient [place] [address] admission about five years ago     Medication prescribed by psychiatry and reports being medication compliant and feels the mediation is helpful  Denied drug and alcohol history  Support system is his household:  Significant other and great niece  On disability  Tobacco use is current: chewing tobacco   Patient educated regarding the impact of nicotine and alcohol on the post surgery bariatric patient  Patient has a positive family history of tobacco and alcohol addiction   Decision for surgery deferred:  Patient will be required to have psychiatric assessment   BARIATRIC SURGERY EDUCATION CHECKLIST     I have received education related to my bariatric surgery process and understand:     Patients may be required to complete a psychiatric evaluation and receive clearance for surgery from their psychiatrist      Patients who undergo weight loss surgery are at higher risk of increased mental health concerns and suicide attempts  Patients may be required to complete a full substance abuse evaluation and then complete all treatment recommendations prior to surgery  If diagnosis of abuse/dependence results, patient may be required to remain sober for one (1) year before having bariatric surgery  Patients on psychiatric medications should check with their provider to discuss psychiatric medications and the changes in absorption  Patient should discuss all time release medications with provider and take all medications as prescribed  The recommendation is that there is no use of  any tobacco products, Hookah or  vapes for the bariatric post-operation patient  Bariatric surgery patients should not consume alcohol as a post-operative patient as it may increase risk of numerous health conditions including but not limited to alcohol abuse and ulcers  There is a possibility of weight regain if patient does not follow all program guidelines and recommendations  Bariatric surgery patients should exercise thirty (30) to sixty (60) minutes per day to maintain post-surgical weight loss  Research indicates that bariatric patients are more successful when they see a therapist for up to two (2) years post-op  Patients will follow all medical and dietary recommendations provided  Patient will keep all scheduled appointments and follow up with their physician for a minimum of five (5) years  Patient will take all vitamins as recommended  Post-operative vitamins are life-long       Patient reviewed Bariatric Surgery Education Checklist and agrees they have received education on these issues

## 2020-10-01 ENCOUNTER — OFFICE VISIT (OUTPATIENT)
Dept: PSYCHIATRY | Facility: CLINIC | Age: 55
End: 2020-10-01
Payer: MEDICARE

## 2020-10-01 DIAGNOSIS — F41.1 GENERALIZED ANXIETY DISORDER: Chronic | ICD-10-CM

## 2020-10-01 DIAGNOSIS — F41.0 PANIC ATTACKS: ICD-10-CM

## 2020-10-01 DIAGNOSIS — G47.00 INSOMNIA, UNSPECIFIED TYPE: ICD-10-CM

## 2020-10-01 DIAGNOSIS — F31.61 BIPOLAR DISORDER, CURRENT EPISODE MIXED, MILD (HCC): ICD-10-CM

## 2020-10-01 PROBLEM — Z63.4 BEREAVEMENT: Status: RESOLVED | Noted: 2020-01-17 | Resolved: 2020-10-01

## 2020-10-01 PROCEDURE — 99214 OFFICE O/P EST MOD 30 MIN: CPT | Performed by: PHYSICIAN ASSISTANT

## 2020-10-01 RX ORDER — BUSPIRONE HYDROCHLORIDE 15 MG/1
15 TABLET ORAL 2 TIMES DAILY
Qty: 180 TABLET | Refills: 0 | Status: SHIPPED | OUTPATIENT
Start: 2020-10-01 | End: 2021-01-18 | Stop reason: SDUPTHER

## 2020-10-01 RX ORDER — BUPROPION HYDROCHLORIDE 100 MG/1
100 TABLET ORAL DAILY
Qty: 90 TABLET | Refills: 0 | Status: SHIPPED | OUTPATIENT
Start: 2020-10-01 | End: 2021-01-18 | Stop reason: SDUPTHER

## 2020-10-01 RX ORDER — TRAZODONE HYDROCHLORIDE 50 MG/1
50 TABLET ORAL
Qty: 90 TABLET | Refills: 0 | Status: SHIPPED | OUTPATIENT
Start: 2020-10-01 | End: 2021-01-18 | Stop reason: SDUPTHER

## 2020-10-01 RX ORDER — CLONAZEPAM 1 MG/1
1 TABLET ORAL DAILY PRN
Qty: 30 TABLET | Refills: 2 | Status: SHIPPED | OUTPATIENT
Start: 2020-10-01 | End: 2021-01-15

## 2020-10-02 ENCOUNTER — TELEPHONE (OUTPATIENT)
Dept: BARIATRICS | Facility: CLINIC | Age: 55
End: 2020-10-02

## 2020-10-07 ENCOUNTER — TRANSCRIBE ORDERS (OUTPATIENT)
Dept: LAB | Facility: CLINIC | Age: 55
End: 2020-10-07

## 2020-10-07 ENCOUNTER — LAB (OUTPATIENT)
Dept: LAB | Facility: CLINIC | Age: 55
End: 2020-10-07
Payer: MEDICARE

## 2020-10-07 DIAGNOSIS — N18.30 BENIGN HYPERTENSION WITH CKD (CHRONIC KIDNEY DISEASE) STAGE III (HCC): ICD-10-CM

## 2020-10-07 DIAGNOSIS — D63.1 ANEMIA DUE TO STAGE 3 CHRONIC KIDNEY DISEASE (HCC): ICD-10-CM

## 2020-10-07 DIAGNOSIS — I12.9 BENIGN HYPERTENSION WITH CKD (CHRONIC KIDNEY DISEASE) STAGE III (HCC): ICD-10-CM

## 2020-10-07 DIAGNOSIS — R80.1 PERSISTENT PROTEINURIA: ICD-10-CM

## 2020-10-07 DIAGNOSIS — G47.33 OSA (OBSTRUCTIVE SLEEP APNEA): ICD-10-CM

## 2020-10-07 DIAGNOSIS — Z79.4 TYPE 2 DIABETES MELLITUS WITH HYPERGLYCEMIA, WITH LONG-TERM CURRENT USE OF INSULIN (HCC): ICD-10-CM

## 2020-10-07 DIAGNOSIS — N18.30 ANEMIA DUE TO STAGE 3 CHRONIC KIDNEY DISEASE (HCC): ICD-10-CM

## 2020-10-07 DIAGNOSIS — N18.30 STAGE 3 CHRONIC KIDNEY DISEASE (HCC): ICD-10-CM

## 2020-10-07 DIAGNOSIS — E11.65 TYPE 2 DIABETES MELLITUS WITH HYPERGLYCEMIA, WITH LONG-TERM CURRENT USE OF INSULIN (HCC): ICD-10-CM

## 2020-10-07 DIAGNOSIS — E78.2 MIXED HYPERLIPIDEMIA: ICD-10-CM

## 2020-10-07 LAB
25(OH)D3 SERPL-MCNC: 11.6 NG/ML (ref 30–100)
ALBUMIN SERPL BCP-MCNC: 2.8 G/DL (ref 3.5–5)
ANION GAP SERPL CALCULATED.3IONS-SCNC: 6 MMOL/L (ref 4–13)
BACTERIA UR QL AUTO: ABNORMAL /HPF
BILIRUB UR QL STRIP: NEGATIVE
BUN SERPL-MCNC: 15 MG/DL (ref 5–25)
CALCIUM ALBUM COR SERPL-MCNC: 9.6 MG/DL (ref 8.3–10.1)
CALCIUM SERPL-MCNC: 8.6 MG/DL (ref 8.3–10.1)
CHLORIDE SERPL-SCNC: 105 MMOL/L (ref 100–108)
CLARITY UR: CLEAR
CO2 SERPL-SCNC: 25 MMOL/L (ref 21–32)
COLOR UR: ABNORMAL
CREAT SERPL-MCNC: 1.58 MG/DL (ref 0.6–1.3)
CREAT UR-MCNC: 95 MG/DL
ERYTHROCYTE [DISTWIDTH] IN BLOOD BY AUTOMATED COUNT: 14.6 % (ref 11.6–15.1)
EST. AVERAGE GLUCOSE BLD GHB EST-MCNC: 192 MG/DL
GFR SERPL CREATININE-BSD FRML MDRD: 56 ML/MIN/1.73SQ M
GLUCOSE P FAST SERPL-MCNC: 153 MG/DL (ref 65–99)
GLUCOSE UR STRIP-MCNC: NEGATIVE MG/DL
HBA1C MFR BLD: 8.3 %
HCT VFR BLD AUTO: 31.8 % (ref 36.5–49.3)
HGB BLD-MCNC: 10.3 G/DL (ref 12–17)
HGB UR QL STRIP.AUTO: ABNORMAL
KETONES UR STRIP-MCNC: NEGATIVE MG/DL
LEUKOCYTE ESTERASE UR QL STRIP: NEGATIVE
MCH RBC QN AUTO: 24.9 PG (ref 26.8–34.3)
MCHC RBC AUTO-ENTMCNC: 32.4 G/DL (ref 31.4–37.4)
MCV RBC AUTO: 77 FL (ref 82–98)
MUCOUS THREADS UR QL AUTO: ABNORMAL
NITRITE UR QL STRIP: NEGATIVE
NON-SQ EPI CELLS URNS QL MICRO: ABNORMAL /HPF
PH UR STRIP.AUTO: 6 [PH]
PHOSPHATE SERPL-MCNC: 2.9 MG/DL (ref 2.7–4.5)
PLATELET # BLD AUTO: 219 THOUSANDS/UL (ref 149–390)
PMV BLD AUTO: 9.3 FL (ref 8.9–12.7)
POTASSIUM SERPL-SCNC: 4.1 MMOL/L (ref 3.5–5.3)
PROT UR STRIP-MCNC: ABNORMAL MG/DL
PROT UR-MCNC: 70 MG/DL
PROT/CREAT UR: 0.74 MG/G{CREAT} (ref 0–0.1)
PTH-INTACT SERPL-MCNC: 78.7 PG/ML (ref 18.4–80.1)
RBC # BLD AUTO: 4.14 MILLION/UL (ref 3.88–5.62)
RBC #/AREA URNS AUTO: ABNORMAL /HPF
SODIUM SERPL-SCNC: 136 MMOL/L (ref 136–145)
SP GR UR STRIP.AUTO: 1.01 (ref 1–1.03)
UROBILINOGEN UR QL STRIP.AUTO: 0.2 E.U./DL
WBC # BLD AUTO: 7.44 THOUSAND/UL (ref 4.31–10.16)
WBC #/AREA URNS AUTO: ABNORMAL /HPF

## 2020-10-07 PROCEDURE — 84156 ASSAY OF PROTEIN URINE: CPT

## 2020-10-07 PROCEDURE — 81001 URINALYSIS AUTO W/SCOPE: CPT

## 2020-10-07 PROCEDURE — 80069 RENAL FUNCTION PANEL: CPT

## 2020-10-07 PROCEDURE — 85027 COMPLETE CBC AUTOMATED: CPT

## 2020-10-07 PROCEDURE — 83970 ASSAY OF PARATHORMONE: CPT

## 2020-10-07 PROCEDURE — 83036 HEMOGLOBIN GLYCOSYLATED A1C: CPT

## 2020-10-07 PROCEDURE — 82570 ASSAY OF URINE CREATININE: CPT

## 2020-10-07 PROCEDURE — 82306 VITAMIN D 25 HYDROXY: CPT

## 2020-10-07 PROCEDURE — 36415 COLL VENOUS BLD VENIPUNCTURE: CPT

## 2020-10-09 ENCOUNTER — OFFICE VISIT (OUTPATIENT)
Dept: NEPHROLOGY | Facility: CLINIC | Age: 55
End: 2020-10-09
Payer: MEDICARE

## 2020-10-09 ENCOUNTER — TELEPHONE (OUTPATIENT)
Dept: NEPHROLOGY | Facility: CLINIC | Age: 55
End: 2020-10-09

## 2020-10-09 VITALS
SYSTOLIC BLOOD PRESSURE: 134 MMHG | HEIGHT: 70 IN | TEMPERATURE: 98.7 F | BODY MASS INDEX: 49.05 KG/M2 | DIASTOLIC BLOOD PRESSURE: 76 MMHG | HEART RATE: 78 BPM

## 2020-10-09 DIAGNOSIS — E66.01 MORBID OBESITY WITH BMI OF 50.0-59.9, ADULT (HCC): ICD-10-CM

## 2020-10-09 DIAGNOSIS — R80.1 PERSISTENT PROTEINURIA: Primary | ICD-10-CM

## 2020-10-09 DIAGNOSIS — D63.1 ANEMIA DUE TO STAGE 3A CHRONIC KIDNEY DISEASE (HCC): ICD-10-CM

## 2020-10-09 DIAGNOSIS — N18.30 BENIGN HYPERTENSION WITH CKD (CHRONIC KIDNEY DISEASE) STAGE III (HCC): ICD-10-CM

## 2020-10-09 DIAGNOSIS — E78.2 MIXED HYPERLIPIDEMIA: ICD-10-CM

## 2020-10-09 DIAGNOSIS — G47.33 OSA (OBSTRUCTIVE SLEEP APNEA): ICD-10-CM

## 2020-10-09 DIAGNOSIS — N18.31 ANEMIA DUE TO STAGE 3A CHRONIC KIDNEY DISEASE (HCC): ICD-10-CM

## 2020-10-09 DIAGNOSIS — K21.9 GASTROESOPHAGEAL REFLUX DISEASE, UNSPECIFIED WHETHER ESOPHAGITIS PRESENT: ICD-10-CM

## 2020-10-09 DIAGNOSIS — I12.9 BENIGN HYPERTENSION WITH CKD (CHRONIC KIDNEY DISEASE) STAGE III (HCC): ICD-10-CM

## 2020-10-09 DIAGNOSIS — N18.31 STAGE 3A CHRONIC KIDNEY DISEASE (HCC): ICD-10-CM

## 2020-10-09 PROBLEM — D64.9 ANEMIA, UNSPECIFIED: Status: ACTIVE | Noted: 2019-08-28

## 2020-10-09 PROCEDURE — 99213 OFFICE O/P EST LOW 20 MIN: CPT | Performed by: NURSE PRACTITIONER

## 2020-10-23 ENCOUNTER — CLINICAL SUPPORT (OUTPATIENT)
Dept: INTERNAL MEDICINE CLINIC | Facility: CLINIC | Age: 55
End: 2020-10-23

## 2020-10-23 DIAGNOSIS — Z23 NEED FOR INFLUENZA VACCINATION: Primary | ICD-10-CM

## 2020-10-23 PROCEDURE — 90682 RIV4 VACC RECOMBINANT DNA IM: CPT | Performed by: INTERNAL MEDICINE

## 2020-10-23 PROCEDURE — G0008 ADMIN INFLUENZA VIRUS VAC: HCPCS | Performed by: INTERNAL MEDICINE

## 2020-10-28 ENCOUNTER — TELEPHONE (OUTPATIENT)
Dept: INTERNAL MEDICINE CLINIC | Facility: CLINIC | Age: 55
End: 2020-10-28

## 2020-10-28 DIAGNOSIS — Z79.4 TYPE 2 DIABETES MELLITUS WITH HYPERGLYCEMIA, WITH LONG-TERM CURRENT USE OF INSULIN (HCC): Primary | ICD-10-CM

## 2020-10-28 DIAGNOSIS — E11.65 TYPE 2 DIABETES MELLITUS WITH HYPERGLYCEMIA, WITH LONG-TERM CURRENT USE OF INSULIN (HCC): Primary | ICD-10-CM

## 2020-11-02 ENCOUNTER — OFFICE VISIT (OUTPATIENT)
Dept: MULTI SPECIALTY CLINIC | Facility: CLINIC | Age: 55
End: 2020-11-02

## 2020-11-02 VITALS
DIASTOLIC BLOOD PRESSURE: 72 MMHG | SYSTOLIC BLOOD PRESSURE: 122 MMHG | OXYGEN SATURATION: 99 % | WEIGHT: 315 LBS | HEART RATE: 87 BPM | HEIGHT: 70 IN | TEMPERATURE: 96 F | BODY MASS INDEX: 45.1 KG/M2

## 2020-11-02 DIAGNOSIS — G63 POLYNEUROPATHY ASSOCIATED WITH UNDERLYING DISEASE (HCC): Primary | ICD-10-CM

## 2020-11-02 DIAGNOSIS — E11.65 TYPE 2 DIABETES MELLITUS WITH HYPERGLYCEMIA, WITH LONG-TERM CURRENT USE OF INSULIN (HCC): ICD-10-CM

## 2020-11-02 DIAGNOSIS — L85.3 XEROSIS CUTIS: ICD-10-CM

## 2020-11-02 DIAGNOSIS — Z79.4 TYPE 2 DIABETES MELLITUS WITH HYPERGLYCEMIA, WITH LONG-TERM CURRENT USE OF INSULIN (HCC): ICD-10-CM

## 2020-11-02 DIAGNOSIS — B35.1 ONYCHOMYCOSIS: ICD-10-CM

## 2020-11-02 PROCEDURE — 11721 DEBRIDE NAIL 6 OR MORE: CPT | Performed by: PODIATRIST

## 2020-11-02 PROCEDURE — 99213 OFFICE O/P EST LOW 20 MIN: CPT | Performed by: PODIATRIST

## 2020-11-02 RX ORDER — AMMONIUM LACTATE 12 G/100G
CREAM TOPICAL AS NEEDED
Qty: 385 G | Refills: 0 | Status: SHIPPED | OUTPATIENT
Start: 2020-11-02 | End: 2021-05-18 | Stop reason: SDUPTHER

## 2020-11-09 ENCOUNTER — TELEPHONE (OUTPATIENT)
Dept: INTERNAL MEDICINE CLINIC | Facility: CLINIC | Age: 55
End: 2020-11-09

## 2020-11-09 DIAGNOSIS — Z79.4 TYPE 2 DIABETES MELLITUS WITH HYPERGLYCEMIA, WITH LONG-TERM CURRENT USE OF INSULIN (HCC): Primary | ICD-10-CM

## 2020-11-09 DIAGNOSIS — E11.65 TYPE 2 DIABETES MELLITUS WITH HYPERGLYCEMIA, WITH LONG-TERM CURRENT USE OF INSULIN (HCC): Primary | ICD-10-CM

## 2020-11-10 ENCOUNTER — OFFICE VISIT (OUTPATIENT)
Dept: MULTI SPECIALTY CLINIC | Facility: CLINIC | Age: 55
End: 2020-11-10

## 2020-11-10 VITALS
WEIGHT: 315 LBS | BODY MASS INDEX: 45.1 KG/M2 | OXYGEN SATURATION: 99 % | SYSTOLIC BLOOD PRESSURE: 116 MMHG | DIASTOLIC BLOOD PRESSURE: 86 MMHG | HEART RATE: 94 BPM | HEIGHT: 70 IN | TEMPERATURE: 96 F

## 2020-11-10 DIAGNOSIS — E78.2 MIXED HYPERLIPIDEMIA: ICD-10-CM

## 2020-11-10 DIAGNOSIS — E11.65 TYPE 2 DIABETES MELLITUS WITH HYPERGLYCEMIA, WITH LONG-TERM CURRENT USE OF INSULIN (HCC): Primary | ICD-10-CM

## 2020-11-10 DIAGNOSIS — Z79.4 TYPE 2 DIABETES MELLITUS WITH HYPERGLYCEMIA, WITH LONG-TERM CURRENT USE OF INSULIN (HCC): Primary | ICD-10-CM

## 2020-11-10 DIAGNOSIS — I12.9 BENIGN HYPERTENSION WITH CKD (CHRONIC KIDNEY DISEASE) STAGE III (HCC): ICD-10-CM

## 2020-11-10 DIAGNOSIS — N18.30 BENIGN HYPERTENSION WITH CKD (CHRONIC KIDNEY DISEASE) STAGE III (HCC): ICD-10-CM

## 2020-11-10 PROCEDURE — 99214 OFFICE O/P EST MOD 30 MIN: CPT | Performed by: PHYSICIAN ASSISTANT

## 2020-11-11 ENCOUNTER — SOCIAL WORK (OUTPATIENT)
Dept: BEHAVIORAL/MENTAL HEALTH CLINIC | Facility: CLINIC | Age: 55
End: 2020-11-11
Payer: MEDICARE

## 2020-11-11 DIAGNOSIS — F41.0 PANIC ATTACKS: ICD-10-CM

## 2020-11-11 DIAGNOSIS — G47.00 INSOMNIA, UNSPECIFIED TYPE: ICD-10-CM

## 2020-11-11 DIAGNOSIS — F41.1 GENERALIZED ANXIETY DISORDER: ICD-10-CM

## 2020-11-11 DIAGNOSIS — F31.61 BIPOLAR DISORDER, CURRENT EPISODE MIXED, MILD (HCC): ICD-10-CM

## 2020-11-11 PROCEDURE — 90834 PSYTX W PT 45 MINUTES: CPT | Performed by: SOCIAL WORKER

## 2020-11-20 ENCOUNTER — TELEPHONE (OUTPATIENT)
Dept: BARIATRICS | Facility: CLINIC | Age: 55
End: 2020-11-20

## 2020-12-03 ENCOUNTER — SOCIAL WORK (OUTPATIENT)
Dept: BEHAVIORAL/MENTAL HEALTH CLINIC | Facility: CLINIC | Age: 55
End: 2020-12-03
Payer: MEDICARE

## 2020-12-03 DIAGNOSIS — F41.0 PANIC ATTACKS: ICD-10-CM

## 2020-12-03 DIAGNOSIS — Z79.4 TYPE 2 DIABETES MELLITUS WITH HYPERGLYCEMIA, WITH LONG-TERM CURRENT USE OF INSULIN (HCC): ICD-10-CM

## 2020-12-03 DIAGNOSIS — F41.1 GENERALIZED ANXIETY DISORDER: ICD-10-CM

## 2020-12-03 DIAGNOSIS — E11.65 TYPE 2 DIABETES MELLITUS WITH HYPERGLYCEMIA, WITH LONG-TERM CURRENT USE OF INSULIN (HCC): ICD-10-CM

## 2020-12-03 DIAGNOSIS — F31.61 BIPOLAR DISORDER, CURRENT EPISODE MIXED, MILD (HCC): ICD-10-CM

## 2020-12-03 DIAGNOSIS — G47.00 INSOMNIA, UNSPECIFIED TYPE: ICD-10-CM

## 2020-12-03 PROCEDURE — 90834 PSYTX W PT 45 MINUTES: CPT | Performed by: SOCIAL WORKER

## 2020-12-03 RX ORDER — BLOOD-GLUCOSE METER
KIT MISCELLANEOUS
Qty: 400 EACH | Refills: 1 | Status: SHIPPED | OUTPATIENT
Start: 2020-12-03 | End: 2021-06-08

## 2020-12-07 DIAGNOSIS — F41.1 GENERALIZED ANXIETY DISORDER: Chronic | ICD-10-CM

## 2020-12-07 RX ORDER — BUSPIRONE HYDROCHLORIDE 15 MG/1
TABLET ORAL
Qty: 180 TABLET | Refills: 0 | OUTPATIENT
Start: 2020-12-07

## 2020-12-28 DIAGNOSIS — F31.61 BIPOLAR DISORDER, CURRENT EPISODE MIXED, MILD (HCC): ICD-10-CM

## 2020-12-28 DIAGNOSIS — K22.2 LOWER ESOPHAGEAL RING (SCHATZKI): ICD-10-CM

## 2020-12-28 DIAGNOSIS — E11.65 TYPE 2 DIABETES MELLITUS WITH HYPERGLYCEMIA, UNSPECIFIED WHETHER LONG TERM INSULIN USE (HCC): ICD-10-CM

## 2020-12-28 DIAGNOSIS — I10 BENIGN ESSENTIAL HYPERTENSION: ICD-10-CM

## 2020-12-28 DIAGNOSIS — E11.42 DIABETIC POLYNEUROPATHY ASSOCIATED WITH TYPE 2 DIABETES MELLITUS (HCC): ICD-10-CM

## 2020-12-28 DIAGNOSIS — K44.9 HIATAL HERNIA: ICD-10-CM

## 2020-12-28 DIAGNOSIS — K21.00 GASTROESOPHAGEAL REFLUX DISEASE WITH ESOPHAGITIS: ICD-10-CM

## 2020-12-28 RX ORDER — AMLODIPINE BESYLATE 5 MG/1
TABLET ORAL
Qty: 90 TABLET | Refills: 1 | Status: SHIPPED | OUTPATIENT
Start: 2020-12-28 | End: 2021-06-14

## 2020-12-28 RX ORDER — LISINOPRIL AND HYDROCHLOROTHIAZIDE 25; 20 MG/1; MG/1
TABLET ORAL
Qty: 90 TABLET | Refills: 1 | Status: SHIPPED | OUTPATIENT
Start: 2020-12-28 | End: 2021-06-14

## 2020-12-28 RX ORDER — ATORVASTATIN CALCIUM 10 MG/1
TABLET, FILM COATED ORAL
Qty: 90 TABLET | Refills: 1 | Status: SHIPPED | OUTPATIENT
Start: 2020-12-28 | End: 2021-06-14

## 2020-12-28 RX ORDER — OMEPRAZOLE 20 MG/1
CAPSULE, DELAYED RELEASE ORAL
Qty: 90 CAPSULE | Refills: 1 | Status: SHIPPED | OUTPATIENT
Start: 2020-12-28 | End: 2021-06-14

## 2020-12-28 RX ORDER — GABAPENTIN 300 MG/1
CAPSULE ORAL
Qty: 180 CAPSULE | Refills: 1 | Status: SHIPPED | OUTPATIENT
Start: 2020-12-28 | End: 2021-06-14

## 2020-12-28 RX ORDER — LURASIDONE HYDROCHLORIDE 120 MG/1
TABLET, FILM COATED ORAL
Qty: 90 TABLET | Refills: 0 | Status: SHIPPED | OUTPATIENT
Start: 2020-12-28 | End: 2021-01-18 | Stop reason: SDUPTHER

## 2020-12-29 DIAGNOSIS — E11.65 TYPE 2 DIABETES MELLITUS WITH HYPERGLYCEMIA, WITH LONG-TERM CURRENT USE OF INSULIN (HCC): ICD-10-CM

## 2020-12-29 DIAGNOSIS — Z79.4 TYPE 2 DIABETES MELLITUS WITH HYPERGLYCEMIA, WITH LONG-TERM CURRENT USE OF INSULIN (HCC): ICD-10-CM

## 2020-12-29 RX ORDER — DULAGLUTIDE 1.5 MG/.5ML
INJECTION, SOLUTION SUBCUTANEOUS
Qty: 12 PEN | Refills: 1 | Status: SHIPPED | OUTPATIENT
Start: 2020-12-29 | End: 2021-06-14

## 2021-01-07 DIAGNOSIS — Z79.4 TYPE 2 DIABETES MELLITUS WITH HYPERGLYCEMIA, WITH LONG-TERM CURRENT USE OF INSULIN (HCC): ICD-10-CM

## 2021-01-07 DIAGNOSIS — E11.65 TYPE 2 DIABETES MELLITUS WITH HYPERGLYCEMIA, WITH LONG-TERM CURRENT USE OF INSULIN (HCC): ICD-10-CM

## 2021-01-07 RX ORDER — INSULIN LISPRO 100 [IU]/ML
INJECTION, SOLUTION INTRAVENOUS; SUBCUTANEOUS
Qty: 90 ML | Refills: 0 | Status: SHIPPED | OUTPATIENT
Start: 2021-01-07 | End: 2021-01-20 | Stop reason: SDUPTHER
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
## 2021-01-15 DIAGNOSIS — F41.0 PANIC ATTACKS: ICD-10-CM

## 2021-01-15 RX ORDER — CLONAZEPAM 1 MG/1
TABLET ORAL
Qty: 30 TABLET | Refills: 0 | Status: SHIPPED | OUTPATIENT
Start: 2021-01-15 | End: 2021-01-18 | Stop reason: SDUPTHER

## 2021-01-18 ENCOUNTER — OFFICE VISIT (OUTPATIENT)
Dept: PSYCHIATRY | Facility: CLINIC | Age: 56
End: 2021-01-18
Payer: MEDICARE

## 2021-01-18 DIAGNOSIS — G47.00 INSOMNIA, UNSPECIFIED TYPE: ICD-10-CM

## 2021-01-18 DIAGNOSIS — F41.0 PANIC ATTACKS: ICD-10-CM

## 2021-01-18 DIAGNOSIS — F31.61 BIPOLAR DISORDER, CURRENT EPISODE MIXED, MILD (HCC): ICD-10-CM

## 2021-01-18 DIAGNOSIS — F41.1 GENERALIZED ANXIETY DISORDER: Primary | Chronic | ICD-10-CM

## 2021-01-18 DIAGNOSIS — R29.818 EXTRAPYRAMIDAL SYMPTOM: ICD-10-CM

## 2021-01-18 PROCEDURE — 99214 OFFICE O/P EST MOD 30 MIN: CPT | Performed by: PHYSICIAN ASSISTANT

## 2021-01-18 RX ORDER — BUPROPION HYDROCHLORIDE 100 MG/1
100 TABLET ORAL DAILY
Qty: 90 TABLET | Refills: 0 | Status: SHIPPED | OUTPATIENT
Start: 2021-01-18 | End: 2021-04-15 | Stop reason: SDUPTHER

## 2021-01-18 RX ORDER — TRAZODONE HYDROCHLORIDE 50 MG/1
50 TABLET ORAL
Qty: 90 TABLET | Refills: 0 | Status: SHIPPED | OUTPATIENT
Start: 2021-01-18 | End: 2021-04-15 | Stop reason: SDUPTHER

## 2021-01-18 RX ORDER — BENZTROPINE MESYLATE 1 MG/1
1 TABLET ORAL DAILY PRN
Qty: 90 TABLET | Refills: 0 | Status: SHIPPED | OUTPATIENT
Start: 2021-01-18 | End: 2021-04-19

## 2021-01-18 RX ORDER — BUSPIRONE HYDROCHLORIDE 15 MG/1
15 TABLET ORAL 2 TIMES DAILY
Qty: 180 TABLET | Refills: 0 | Status: SHIPPED | OUTPATIENT
Start: 2021-01-18 | End: 2021-04-15 | Stop reason: SDUPTHER

## 2021-01-18 RX ORDER — CLONAZEPAM 1 MG/1
TABLET ORAL
Qty: 30 TABLET | Refills: 2 | Status: SHIPPED | OUTPATIENT
Start: 2021-01-18 | End: 2021-04-15 | Stop reason: SDUPTHER

## 2021-01-20 DIAGNOSIS — E11.65 TYPE 2 DIABETES MELLITUS WITH HYPERGLYCEMIA, WITH LONG-TERM CURRENT USE OF INSULIN (HCC): ICD-10-CM

## 2021-01-20 DIAGNOSIS — Z79.4 TYPE 2 DIABETES MELLITUS WITH HYPERGLYCEMIA, WITH LONG-TERM CURRENT USE OF INSULIN (HCC): ICD-10-CM

## 2021-01-20 RX ORDER — INSULIN LISPRO 100 [IU]/ML
INJECTION, SOLUTION INTRAVENOUS; SUBCUTANEOUS
Qty: 30 PEN | Refills: 0 | Status: SHIPPED | OUTPATIENT
Start: 2021-01-20 | End: 2021-12-16

## 2021-02-09 ENCOUNTER — OFFICE VISIT (OUTPATIENT)
Dept: MULTI SPECIALTY CLINIC | Facility: CLINIC | Age: 56
End: 2021-02-09

## 2021-02-09 VITALS
TEMPERATURE: 98.1 F | WEIGHT: 315 LBS | DIASTOLIC BLOOD PRESSURE: 92 MMHG | HEART RATE: 89 BPM | BODY MASS INDEX: 50.36 KG/M2 | SYSTOLIC BLOOD PRESSURE: 161 MMHG

## 2021-02-09 DIAGNOSIS — B35.1 ONYCHOMYCOSIS: Primary | ICD-10-CM

## 2021-02-09 DIAGNOSIS — E11.65 TYPE 2 DIABETES MELLITUS WITH HYPERGLYCEMIA, WITH LONG-TERM CURRENT USE OF INSULIN (HCC): ICD-10-CM

## 2021-02-09 DIAGNOSIS — L85.3 XEROSIS CUTIS: ICD-10-CM

## 2021-02-09 DIAGNOSIS — Z79.4 TYPE 2 DIABETES MELLITUS WITH HYPERGLYCEMIA, WITH LONG-TERM CURRENT USE OF INSULIN (HCC): ICD-10-CM

## 2021-02-09 PROCEDURE — 11721 DEBRIDE NAIL 6 OR MORE: CPT | Performed by: PODIATRIST

## 2021-02-09 PROCEDURE — 99212 OFFICE O/P EST SF 10 MIN: CPT | Performed by: PODIATRIST

## 2021-02-09 NOTE — PATIENT INSTRUCTIONS
Diabetes and Your Skin   WHAT YOU NEED TO KNOW:   Diabetes can affect every part of your body, including your skin  Diabetes that is not well controlled can damage blood vessels and nerves  Damage to blood vessels can make it hard for blood to flow to tissues and organs  A lack of blood flow to your skin can cause ulcers that are difficult to heal  Skin ulcers are also called sores  People with diabetes can also have more bacterial skin infections than other people  Most skin conditions can be prevented with good blood sugar control  Skin sores can be hard to heal, or become life or limb-threatening, if not treated early  DISCHARGE INSTRUCTIONS:   Call your doctor or care team provider if:   · You get a severe burn or cut  · You have a sore that is painful, warm to the touch, or has redness around it  · Your sore does not get better or seems to get worse  · You have a fever  · Your blood sugar levels continue to be higher than they should be  · You have questions or concerns about your condition or care  Prevent skin sores:   · Keep your blood sugars within target range  Your care team provider will tell you what your blood sugar levels should be  High blood sugar levels increase your risk for skin infections and poor wound healing  · Keep your skin clean  Do not take hot baths or showers  They can cause your skin to get dry  Do not take a bubble bath if you have dry skin  Use moisturizing soaps  · Keep your skin from becoming too dry  Apply moisturizing lotion after baths or showers, especially in cold, dry weather  When you scratch dry, itchy skin, you can cause your skin to be open to infection  Bathe less during cold weather and use lotion to moisturize  Do not put lotion between your toes  Moisture between your toes could lead to skin breakdown  Use a humidifier to keep air in your home from being dry  · Keep areas where skin touches skin dry    Use talcum powder in areas such as armpits and groin  You may also need it under your breasts, and between your toes  Moisture in these areas can cause a fungal infection  · Treat cuts immediately  Clean minor cuts with soap and water  Cover them with sterile gauze  Follow up with your doctor or diabetes care team providers as directed:  Write down your questions so you remember to ask them during your visits  © Copyright [address] Information is for End User's use only and may not be sold, redistributed or otherwise used for commercial purposes  All illustrations and images included in CareNotes® are the copyrighted property of [employer]  or [address] [place] [address]  The above information is an  only  It is not intended as medical advice for individual conditions or treatments  Talk to your doctor, nurse or pharmacist before following any medical regimen to see if it is safe and effective for you
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
## 2021-03-09 ENCOUNTER — OFFICE VISIT (OUTPATIENT)
Dept: INTERNAL MEDICINE CLINIC | Facility: CLINIC | Age: 56
End: 2021-03-09

## 2021-03-09 ENCOUNTER — OFFICE VISIT (OUTPATIENT)
Dept: MULTI SPECIALTY CLINIC | Facility: CLINIC | Age: 56
End: 2021-03-09

## 2021-03-09 VITALS
OXYGEN SATURATION: 98 % | HEIGHT: 66 IN | SYSTOLIC BLOOD PRESSURE: 111 MMHG | BODY MASS INDEX: 50.62 KG/M2 | TEMPERATURE: 97.8 F | HEART RATE: 98 BPM | DIASTOLIC BLOOD PRESSURE: 74 MMHG | WEIGHT: 315 LBS

## 2021-03-09 VITALS
TEMPERATURE: 97.9 F | DIASTOLIC BLOOD PRESSURE: 75 MMHG | SYSTOLIC BLOOD PRESSURE: 108 MMHG | WEIGHT: 315 LBS | HEART RATE: 101 BPM | BODY MASS INDEX: 50.36 KG/M2

## 2021-03-09 DIAGNOSIS — M79.672 FOOT PAIN, LEFT: Primary | ICD-10-CM

## 2021-03-09 DIAGNOSIS — Z79.4 TYPE 2 DIABETES MELLITUS WITH HYPERGLYCEMIA, WITH LONG-TERM CURRENT USE OF INSULIN (HCC): Primary | ICD-10-CM

## 2021-03-09 DIAGNOSIS — N18.30 BENIGN HYPERTENSION WITH CKD (CHRONIC KIDNEY DISEASE) STAGE III (HCC): ICD-10-CM

## 2021-03-09 DIAGNOSIS — N18.31 STAGE 3A CHRONIC KIDNEY DISEASE (HCC): ICD-10-CM

## 2021-03-09 DIAGNOSIS — M76.72 PERONEAL TENDINITIS OF LEFT LOWER EXTREMITY: ICD-10-CM

## 2021-03-09 DIAGNOSIS — I12.9 BENIGN HYPERTENSION WITH CKD (CHRONIC KIDNEY DISEASE) STAGE III (HCC): ICD-10-CM

## 2021-03-09 DIAGNOSIS — E66.01 MORBID OBESITY WITH BMI OF 50.0-59.9, ADULT (HCC): ICD-10-CM

## 2021-03-09 DIAGNOSIS — F31.61 BIPOLAR DISORDER, CURRENT EPISODE MIXED, MILD (HCC): ICD-10-CM

## 2021-03-09 DIAGNOSIS — E11.42 DIABETIC POLYNEUROPATHY ASSOCIATED WITH TYPE 2 DIABETES MELLITUS (HCC): ICD-10-CM

## 2021-03-09 DIAGNOSIS — E11.65 TYPE 2 DIABETES MELLITUS WITH HYPERGLYCEMIA, WITH LONG-TERM CURRENT USE OF INSULIN (HCC): Primary | ICD-10-CM

## 2021-03-09 PROBLEM — B35.1 ONYCHOMYCOSIS: Status: RESOLVED | Noted: 2019-07-23 | Resolved: 2021-03-09

## 2021-03-09 PROCEDURE — 99213 OFFICE O/P EST LOW 20 MIN: CPT | Performed by: PHYSICIAN ASSISTANT

## 2021-03-09 PROCEDURE — 99212 OFFICE O/P EST SF 10 MIN: CPT | Performed by: PODIATRIST

## 2021-03-09 NOTE — PROGRESS NOTES
Podiatry Clinic Visit  Roro Velez 48 y o  male MRN: [id number]  Encounter: [id number]    Assessment/Plan        Diagnoses and all orders for this visit:    Foot pain, left  -     XR foot 3+ vw left; Future    Peroneal tendinitis of left lower extremity  -     XR foot 3+ vw left; Future       Plan:   Patient was seen and examined with all their questions and concerns addressed   Patient was educated about proper footwear   X-ray of left foot ordered to rule out any osseous abnormalities   He was instructed to purchase Voltaren gel OTC for left lateral foot pain   He was instructed to purchase OTC compression socks for lower extremity edema   Biomechanical examination performed as described below   Patient was re-appointed for 6-weeks    - Dr Dowling Factor was available/present for entirety of patient encounter and present for all procedures  History of Present Illness     HPI: Roro Velez is a 48 y o  male who presents complaining of pain to his left lateral ankle  He states that the pain began about one month ago, however he does not recall any specific injury to his left foot  He states that the pain is worse with ambulation and better with rest  He states that he has not attempted to take any medication for pain at home  The patient has no further podiatric complaints at this time  Review of Systems   Constitutional: Negative  HENT: Negative  Eyes: Negative  Respiratory: Negative  Cardiovascular: Negative  Gastrointestinal: Negative  Musculoskeletal: foot pain  Skin: onychomycosis, xerosis  Neurological: Negative          Historical Information   Past Medical History:   Diagnosis Date    ADHD, adult residual type     Anxiety     Anxiety     Bipolar 1 disorder (HCC)     Cataract     Left eye    Depression     Depression     Diabetes mellitus (HCC)     blood sugar 167 on admission    Equinus deformity of foot     GERD (gastroesophageal reflux disease)     Homicidal ideations     Hyperlipidemia     Hypertension     Microalbuminuria     Morbid obesity (HCC)     Neuropathy     Shortness of breath     Sleep apnea     CPAP at bedtime    Sleep apnea     Stroke ([address] )     Suicidal intent      Past Surgical History:   Procedure Laterality Date    CATARACT EXTRACTION      CATARACT EXTRACTION      HAND SURGERY Right     OTHER SURGICAL HISTORY      REPAIR OF SUPERFICIAL WOUND FACE    RI ESOPHAGOGASTRODUODENOSCOPY TRANSORAL DIAGNOSTIC N/A 2018    Procedure: ESOPHAGOGASTRODUODENOSCOPY (EGD); Surgeon: Jaylen Marion MD;  Location: [facility]; Service: Gastroenterology    RI ESOPHAGOGASTRODUODENOSCOPY TRANSORAL DIAGNOSTIC N/A 2018    Procedure: EGD AND COLONOSCOPY;  Surgeon: Jaylen Marion MD;  Location: [facility];   Service: Gastroenterology     Social History   Social History     Substance and Sexual Activity   Alcohol Use Yes    Frequency: Monthly or less    Drinks per session: 1 or 2    Binge frequency: Never    Comment: rarely     Social History     Substance and Sexual Activity   Drug Use Not Currently    Comment: quit 20 years ago     Social History     Tobacco Use   Smoking Status Former Smoker    Types: Cigarettes    Quit date: 5    Years since quittin 2   Smokeless Tobacco Former User    Types: Chew   Tobacco Comment    pt "Quit after approx over 25 years ago"     Family History:   Family History   Problem Relation Age of Onset    Diabetes Mother     Alcohol abuse Father    Leanne Credit Diabetes Father     Hypertension Father     Lung cancer Father     Stroke Father     Cancer Father         lung    Alcohol abuse Sister     Depression Sister     Lymphoma Sister     Alcohol abuse Family     Alcohol abuse Sister     Alcohol abuse Sister     Cancer Sister     Heart disease Neg Hx     Thyroid disease Neg Hx        Meds/Allergies   (Not in a hospital admission)    Allergies   Allergen Reactions    Pollen Extract Nasal Congestion    Tetanus Toxoid Swelling       Objective     Current Vitals:   Blood Pressure: 108/75 (03/09/21 0831)  Pulse: 101 (03/09/21 0831)  Temperature: 97 9 °F (36 6 °C) (03/09/21 0831)  Weight - Scale: (!) 157 kg (346 lb) (03/09/21 0831)  /75 (BP Location: Right arm, Patient Position: Sitting, Cuff Size: Large)   Pulse 101   Temp 97 9 °F (36 6 °C)   Wt (!) 157 kg (346 lb)   BMI 50 36 kg/m²     Lower Extremity Exam:    Foot Exam    Musculoskeletal:  MMT is 5/5 to all compartments of the LE, +3/4 edema B/L, Hallux limitus is present  Equinus is present  Pain with plantarflexion and eversion against resistance of left foot  Pain on palpation of left 5th metatarsal base  Pain on palpation of left dorsal mid-foot at the dorsum of the navicular  Biomechanical Exam of LE:  Ankle dorsiflexion with knee extended is limited and unable to get pass vertical on right and limited and unable to get pass vertical on left  Ankle dorsiflexion with knee flexed is limited and unable to get pass vertical on right and limited and unable to get pass vertical on left  Malleolar positioning is WNL b/l  STJ ROM WNL b/l, heel inversion is approximately 16° on right and 16° on left; heel eversion is approximately 8° on right and 8° on left; neutral calcaneal stance position is 3-4° inverted  1st ray ROM WNL b/l, able to dorsiflex/plantarflex b/l  Tibial varum is 0° b/l, Resting calcaneal stance position is varus and approximately 1° on right and varus and approximately 1° on left  Gait analysis: restricted ambulation  Gross deformity noted: pes cavus with forefoot adduction present     Vascular:   DP pulses and PT pulses are present and weak bilaterally  , CFT< 3sec to all digits  Pedal hair is Absent  Pulse has regular rate and rhythm  Skin temperature warm to warm B/L  Dermatological:  No open Lesions  Skin of the LE is normal texture, temperature, turgor  Interdigital maceration is not present          Neurologic:  Gross sensation is intact  Protective sensation is absent  Vibratory sensation is Diminished  Sharp sensation is present

## 2021-03-09 NOTE — PATIENT INSTRUCTIONS
On your visit today we did review your blood pressure which remains well controlled no change to medications  We did discuss however that you did not get any of your labs completed prior to your visit today  I have reprinted the order for you to get your cholesterol levels checked  Your labs also include all the labs that were due for your nephrologist in February  Please get these completed at your earliest convenience  We will contact you with your results  Nephrology will then contact you with your results regarding your kidney functions  Continue follow-up with the endocrinologist for your diabetes as scheduled  Continue follow-up with your mental health counselor and psychiatrist as scheduled  You confirm you went to [facility] for sight last week to have your diabetic eye exam completed and will await your report  Get your imaging and follow-up with the podiatrist for your foot pain as scheduled as well  We did discuss that you canceled your follow-up appointments with weight management reporting you admit you will never be able to follow the recommended diet and do not wish to return for their services  We discussed once again as on all visits that healthy dietary changes are just as if not more important than just continuously adjusting  Medications  We reviewed that obesity is not just about your blood pressure and diabetes but it also increases risks for all causes of other chronic conditions and puts you at increased risk for complications  You confirm awareness but also confirm you will not make changes to your diet  We also discussed the importance of getting pre registered to receive your COVID vaccine  Information provided today on how to set this up to be scheduled

## 2021-03-09 NOTE — PATIENT INSTRUCTIONS
Diabetes and Your Skin   WHAT YOU NEED TO KNOW:   Diabetes can affect every part of your body, including your skin  Diabetes that is not well controlled can damage blood vessels and nerves  Damage to blood vessels can make it hard for blood to flow to tissues and organs  A lack of blood flow to your skin can cause ulcers that are difficult to heal  Skin ulcers are also called sores  People with diabetes can also have more bacterial skin infections than other people  Most skin conditions can be prevented with good blood sugar control  Skin sores can be hard to heal, or become life or limb-threatening, if not treated early  DISCHARGE INSTRUCTIONS:   Call your doctor or care team provider if:   · You get a severe burn or cut  · You have a sore that is painful, warm to the touch, or has redness around it  · Your sore does not get better or seems to get worse  · You have a fever  · Your blood sugar levels continue to be higher than they should be  · You have questions or concerns about your condition or care  Prevent skin sores:   · Keep your blood sugars within target range  Your care team provider will tell you what your blood sugar levels should be  High blood sugar levels increase your risk for skin infections and poor wound healing  · Keep your skin clean  Do not take hot baths or showers  They can cause your skin to get dry  Do not take a bubble bath if you have dry skin  Use moisturizing soaps  · Keep your skin from becoming too dry  Apply moisturizing lotion after baths or showers, especially in cold, dry weather  When you scratch dry, itchy skin, you can cause your skin to be open to infection  Bathe less during cold weather and use lotion to moisturize  Do not put lotion between your toes  Moisture between your toes could lead to skin breakdown  Use a humidifier to keep air in your home from being dry  · Keep areas where skin touches skin dry    Use talcum powder in areas such as armpits and groin  You may also need it under your breasts, and between your toes  Moisture in these areas can cause a fungal infection  · Treat cuts immediately  Clean minor cuts with soap and water  Cover them with sterile gauze  Follow up with your doctor or diabetes care team providers as directed:  Write down your questions so you remember to ask them during your visits  © Copyright [address] Information is for End User's use only and may not be sold, redistributed or otherwise used for commercial purposes  All illustrations and images included in CareNotes® are the copyrighted property of [employer]  or [address] [place] [address]  The above information is an  only  It is not intended as medical advice for individual conditions or treatments  Talk to your doctor, nurse or pharmacist before following any medical regimen to see if it is safe and effective for you

## 2021-03-09 NOTE — PROGRESS NOTES
Assessment/Plan: On your visit today we did review your blood pressure which remains well controlled no change to medications  We did discuss however that you did not get any of your labs completed prior to your visit today  I have reprinted the order for you to get your cholesterol levels checked  Your labs also include all the labs that were due for your nephrologist in February  Please get these completed at your earliest convenience  We will contact you with your results  Nephrology will then contact you with your results regarding your kidney functions  Continue follow-up with the endocrinologist for your diabetes as scheduled  Continue follow-up with your mental health counselor and psychiatrist as scheduled  You confirm you went to [facility] for sight last week to have your diabetic eye exam completed and will await your report  Get your imaging and follow-up with the podiatrist for your foot pain as scheduled as well  We did discuss that you canceled your follow-up appointments with weight management reporting you admit you will never be able to follow the recommended diet and do not wish to return for their services  We discussed once again as on all visits that healthy dietary changes are just as if not more important than just continuously adjusting  Medications  We reviewed that obesity is not just about your blood pressure and diabetes but it also increases risks for all causes of other chronic conditions and puts you at increased risk for complications  You confirm awareness but also confirm you will not make changes to your diet  We also discussed the importance of getting pre registered to receive your COVID vaccine  Information provided today on how to set this up to be scheduled  No problem-specific Assessment & Plan notes found for this encounter         Diagnoses and all orders for this visit:    Type 2 diabetes mellitus with hyperglycemia, with long-term current use of insulin (HCC)    Benign hypertension with CKD (chronic kidney disease) stage III (HCC)    Morbid obesity with BMI of 50 0-59 9, adult (HCC)    Diabetic polyneuropathy associated with type 2 diabetes mellitus (HCC)    Stage 3a chronic kidney disease    Bipolar disorder, current episode mixed, mild (HCC)          Subjective:      Patient ID: Cori Babinski is a 48 y o  male  Patient presents today for routine follow-up of chronic medical conditions  Patient follows with endocrinology for his diabetes  Has follow-up appointment scheduled May 2021  Patient also follows with nephrology for CKD stage 3  Patient is overdue for his labs for the nephrologist as these were due in February  Will need to get these completed and he will then be scheduled for his follow-up appointment  Patient saw podiatrist today prior to my visit with him  Is being sent for xray and told achilles tendon is tight    Patient also continues to follow with psychiatrist and counselor for bipolar anxiety and depression  Blood pressure remains well controlled on his lisinopril/hydrochlorothiazide  Patient is maintain on atorvastatin for cardiovascular protection  Did go to weight management and cancelled last appointments states not going back  Pt and wife report not willing to adjust diet  This is not new for patient, he has always admitted not agreeable to dietary modifications  Discussed while meds can help the changes he makes are just as if not more so beneficial     Went to [address] for sight last week for eye exam for DM    Offers no new medical concerns today            The following portions of the patient's history were reviewed and updated as appropriate: allergies, current medications, past family history, past medical history, past social history, past surgical history and problem list     Review of Systems   Constitutional: Negative  Negative for chills and fever  Respiratory: Negative  Negative for cough and shortness of breath  Cardiovascular: Negative  Negative for chest pain and palpitations  Gastrointestinal: Positive for abdominal pain (improved with PPI)  Endocrine: Negative  Negative for cold intolerance, polydipsia, polyphagia and polyuria  Genitourinary: Negative for difficulty urinating and urgency  Musculoskeletal: Positive for arthralgias and myalgias  Skin: Negative for rash  Neurological: Positive for numbness (sometimes hands / feet)  Negative for headaches  Psychiatric/Behavioral: Negative  Objective:      /74 (BP Location: Left arm, Patient Position: Sitting, Cuff Size: Large)   Pulse 98   Temp 97 8 °F (36 6 °C) (Temporal)   Ht 5' 6" (1 676 m)   Wt (!) 157 kg (346 lb)   SpO2 98%   BMI 55 85 kg/m²          Physical Exam  Vitals signs and nursing note reviewed  Constitutional:       General: He is not in acute distress  Appearance: He is obese  HENT:      Head: Normocephalic  Neck:      Musculoskeletal: Neck supple  Cardiovascular:      Rate and Rhythm: Normal rate and regular rhythm  Heart sounds: Normal heart sounds  Comments: Trace bilateral non pitting oedema  Pulmonary:      Effort: Pulmonary effort is normal       Breath sounds: Normal breath sounds  Abdominal:      General: Bowel sounds are normal       Tenderness: There is no abdominal tenderness  Musculoskeletal:      Right lower le+ Edema present  Left lower le+ Edema present  Neurological:      Mental Status: He is alert  Psychiatric:         Mood and Affect: Mood normal          Thought Content:  Thought content normal          Judgment: Judgment normal

## 2021-03-11 ENCOUNTER — APPOINTMENT (OUTPATIENT)
Dept: LAB | Facility: CLINIC | Age: 56
End: 2021-03-11
Payer: MEDICARE

## 2021-03-11 ENCOUNTER — TELEMEDICINE (OUTPATIENT)
Dept: BEHAVIORAL/MENTAL HEALTH CLINIC | Facility: CLINIC | Age: 56
End: 2021-03-11
Payer: MEDICARE

## 2021-03-11 ENCOUNTER — TRANSCRIBE ORDERS (OUTPATIENT)
Dept: LAB | Facility: CLINIC | Age: 56
End: 2021-03-11

## 2021-03-11 DIAGNOSIS — G47.00 INSOMNIA, UNSPECIFIED TYPE: ICD-10-CM

## 2021-03-11 DIAGNOSIS — Z79.4 TYPE 2 DIABETES MELLITUS WITH HYPERGLYCEMIA, WITH LONG-TERM CURRENT USE OF INSULIN (HCC): ICD-10-CM

## 2021-03-11 DIAGNOSIS — F31.61 BIPOLAR DISORDER, CURRENT EPISODE MIXED, MILD (HCC): ICD-10-CM

## 2021-03-11 DIAGNOSIS — I12.9 BENIGN HYPERTENSION WITH CKD (CHRONIC KIDNEY DISEASE) STAGE III (HCC): ICD-10-CM

## 2021-03-11 DIAGNOSIS — E11.65 TYPE 2 DIABETES MELLITUS WITH HYPERGLYCEMIA, WITH LONG-TERM CURRENT USE OF INSULIN (HCC): ICD-10-CM

## 2021-03-11 DIAGNOSIS — N18.30 BENIGN HYPERTENSION WITH CKD (CHRONIC KIDNEY DISEASE) STAGE III (HCC): ICD-10-CM

## 2021-03-11 DIAGNOSIS — F41.1 GENERALIZED ANXIETY DISORDER: ICD-10-CM

## 2021-03-11 DIAGNOSIS — F41.0 PANIC ATTACKS: ICD-10-CM

## 2021-03-11 LAB
ALBUMIN SERPL BCP-MCNC: 2.6 G/DL (ref 3.5–5)
ALP SERPL-CCNC: 89 U/L (ref 46–116)
ALT SERPL W P-5'-P-CCNC: 18 U/L (ref 12–78)
ANION GAP SERPL CALCULATED.3IONS-SCNC: 6 MMOL/L (ref 4–13)
AST SERPL W P-5'-P-CCNC: 11 U/L (ref 5–45)
BILIRUB SERPL-MCNC: 0.25 MG/DL (ref 0.2–1)
BUN SERPL-MCNC: 15 MG/DL (ref 5–25)
CALCIUM ALBUM COR SERPL-MCNC: 10.2 MG/DL (ref 8.3–10.1)
CALCIUM SERPL-MCNC: 9.1 MG/DL (ref 8.3–10.1)
CHLORIDE SERPL-SCNC: 103 MMOL/L (ref 100–108)
CHOLEST SERPL-MCNC: 87 MG/DL (ref 50–200)
CO2 SERPL-SCNC: 27 MMOL/L (ref 21–32)
CREAT SERPL-MCNC: 1.78 MG/DL (ref 0.6–1.3)
EST. AVERAGE GLUCOSE BLD GHB EST-MCNC: 200 MG/DL
GFR SERPL CREATININE-BSD FRML MDRD: 49 ML/MIN/1.73SQ M
GLUCOSE P FAST SERPL-MCNC: 85 MG/DL (ref 65–99)
HBA1C MFR BLD: 8.6 %
HDLC SERPL-MCNC: 39 MG/DL
LDLC SERPL CALC-MCNC: 37 MG/DL (ref 0–100)
POTASSIUM SERPL-SCNC: 4.1 MMOL/L (ref 3.5–5.3)
PROT SERPL-MCNC: 7 G/DL (ref 6.4–8.2)
SODIUM SERPL-SCNC: 136 MMOL/L (ref 136–145)
TRIGL SERPL-MCNC: 53 MG/DL

## 2021-03-11 PROCEDURE — 36415 COLL VENOUS BLD VENIPUNCTURE: CPT

## 2021-03-11 PROCEDURE — 80061 LIPID PANEL: CPT

## 2021-03-11 PROCEDURE — 90834 PSYTX W PT 45 MINUTES: CPT | Performed by: SOCIAL WORKER

## 2021-03-11 PROCEDURE — 80053 COMPREHEN METABOLIC PANEL: CPT

## 2021-03-11 PROCEDURE — 83036 HEMOGLOBIN GLYCOSYLATED A1C: CPT

## 2021-03-11 NOTE — BH TREATMENT PLAN
[facility]        IT is aware the sign pad is again not functioning and the client and the undersigned verbally signed off due to this issue and also due to covid 19 restrictions  1965       Date of Initial Treatment Plan: 5/07/2018  Date of Current Treatment Plan: 03/11/21    Treatment Plan Number update    Strengths/Personal Resources for Self Care: Kind, Caring, has a passion for life    Diagnosis:   1  Bipolar disorder, current episode mixed, mild ([address] )     2  Generalized anxiety disorder     3  Insomnia, unspecified type     4  Panic attacks         Area of Needs: Please see below      Long Term Goal 1: I still need to focus on improving my overall physical health in addition to my mental health    Target Date: 09/11/2021  Completion Date: TBD         Short Term Objectives for Goal 1: I need to start to get some type of physical exercise and I need to start proper nutrition by using the motivational strategies learned in my therapy sessions  Long Term Goal 2: I still need to decrease my overall level of anxiety    Target Date: 09/11/2021  Completion Date: TBD    Short Term Objectives for Goal 2: I will use mindfulness, deep breathing, meditation and CBT strategies used in therapy         Long Term Goal # 3: N/A     Target Date: N/A  Completion Date: N/A    Short Term Objectives for Goal 3: N/A    GOAL 1: Modality: Individual 2x per month   Completion Date tbd    GOAL 2: Modality: Individual 2x per month   Completion Date tbd     GOAL 3: Modality: Individual n/ax per month   Completion Date n/a      Behavioral Health Treatment Plan [facility]: Diagnosis and Treatment Plan explained to Zi Doan relates understanding diagnosis and is agreeable to Treatment Plan         Client Comments : Please share your thoughts, feelings, need and/or experiences regarding your treatment plan: Edwinna Harada and the undersigned will work as a team to help him to progress on his goals

## 2021-03-11 NOTE — PSYCH
This note was not shared with the patient due to this is a psychotherapy note  It was my intent to perform this visit via video technology but the patient was not able to do a video connection so the visit was completed via audio telephone only  Virtual Regular Visit      Assessment/Plan:    Problem List Items Addressed This Visit        Other    Bipolar affective disorder, current episode mixed ([address] )    Panic attacks    Insomnia    Generalized anxiety disorder               Reason for visit is due to ongoing symptoms and covid  Encounter provider [facility]    Provider located at [address] [place] [id number] [phone]      Recent Visits  Date Type Provider Dept   03/09/21 Office Visit Rock Daniels PA-C  [address] [facility] [address] recent visits within past 7 days and meeting all other requirements     Today's Visits  Date Type Provider Dept   03/11/21 [address] Toussaint [facility] Therapist [facility]   Showing today's visits and meeting all other requirements     Future Appointments  No visits were found meeting these conditions  Showing future appointments within next 150 days and meeting all other requirements        The patient was identified by name and date of birth  Alexey Spring was informed that this is a telemedicine visit and that the visit is being conducted through [employer] and patient was informed that this is a secure, HIPAA-compliant platform  He agrees to proceed     My office door was closed  No one else was in the room  He acknowledged consent and understanding of privacy and security of the video platform  The patient has agreed to participate and understands they can discontinue the visit at any time  Patient is aware this is a billable service  Subjective  Alexey Spring is a 48 y o  male          HPI     Past Medical History: Diagnosis Date    ADHD, adult residual type     Anxiety     Anxiety     Bipolar 1 disorder (HCC)     Cataract     Left eye    Depression     Depression     Diabetes mellitus (HCC)     blood sugar 167 on admission    Equinus deformity of foot     GERD (gastroesophageal reflux disease)     Homicidal ideations     Hyperlipidemia     Hypertension     Microalbuminuria     Morbid obesity (HCC)     Neuropathy     Shortness of breath     Sleep apnea     CPAP at bedtime    Sleep apnea     Stroke ([facility] [address]     Suicidal intent        Past Surgical History:   Procedure Laterality Date    CATARACT EXTRACTION      CATARACT EXTRACTION      HAND SURGERY Right     OTHER SURGICAL HISTORY      REPAIR OF SUPERFICIAL WOUND FACE    MD ESOPHAGOGASTRODUODENOSCOPY TRANSORAL DIAGNOSTIC N/A 6/14/2018    Procedure: ESOPHAGOGASTRODUODENOSCOPY (EGD); Surgeon: Cody Evangelista MD;  Location: [facility]; Service: Gastroenterology    MD ESOPHAGOGASTRODUODENOSCOPY TRANSORAL DIAGNOSTIC N/A 9/12/2018    Procedure: EGD AND COLONOSCOPY;  Surgeon: Cody Evangelista MD;  Location: [facility];   Service: Gastroenterology       Current Outpatient Medications   Medication Sig Dispense Refill    amLODIPine (NORVASC) 5 mg tablet TAKE 1 TABLET(5 MG) BY MOUTH DAILY 90 tablet 1    ammonium lactate (LAC-HYDRIN) 12 % cream Apply topically as needed for dry skin 385 g 0    atorvastatin (LIPITOR) 10 mg tablet TAKE 1 TABLET(10 MG) BY MOUTH DAILY 90 tablet 1    benztropine (COGENTIN) 1 mg tablet Take 1 tablet (1 mg total) by mouth daily as needed for tremors (Restlessness) 90 tablet 0    Blood Glucose Monitoring Suppl (FREESTYLE LITE) ANTONIA by Does not apply route 3 (three) times a day 1 each 0    buPROPion (WELLBUTRIN) 100 mg tablet Take 1 tablet (100 mg total) by mouth daily 90 tablet 0    busPIRone (BUSPAR) 15 mg tablet Take 1 tablet (15 mg total) by mouth 2 (two) times a day 180 tablet 0    clonazePAM (KlonoPIN) 1 mg tablet Take 1 tab po qd prn anxiety or panic attacks 30 tablet 2    Dulaglutide (Trulicity) 1 5 RR/5 7OZ SOPN Inject 1 5 mg under the skin 1 time a week 12 pen 1    gabapentin (NEURONTIN) 300 mg capsule TAKE 1 CAPSULE(300 MG) BY MOUTH TWICE DAILY 180 capsule 1    glucose blood (FREESTYLE LITE) test strip CHECK BLOOD SUGARS FOUR TIMES DAILY 400 each 1    insulin lispro (HumaLOG) 100 units/mL injection pen INJECT 28 UNITS THREE TIMES DAILY WITH MEALS PLUS SCALE(UP  UNITS DAILY) 30 pen 0    Insulin Pen Needle (B-D ULTRAFINE III SHORT PEN) 31G X 8 MM MISC by Does not apply route      INSULIN SYRINGE  5CC/29G 29G X 1/2" 0 5 ML MISC by Does not apply route      Lancets (FREESTYLE) lancets USE THREE TIMES DAILY AS DIRECTED 300 each 0    LANTUS SOLOSTAR 100 units/mL injection pen INJECT 50 UNITS UNDER THE SKIN EVERY 12 HOURS 5 pen 1    lisinopril-hydrochlorothiazide (PRINZIDE,ZESTORETIC) 20-25 MG per tablet TAKE 1 TABLET BY MOUTH DAILY 90 tablet 1    lurasidone (Latuda) 120 mg tablet Take 1 tablet (120 mg total) by mouth daily with dinner 90 tablet 0    omeprazole (PriLOSEC) 20 mg delayed release capsule TAKE 1 CAPSULE(20 MG) BY MOUTH DAILY 90 capsule 1    traZODone (DESYREL) 50 mg tablet Take 1 tablet (50 mg total) by mouth daily at bedtime as needed for sleep 90 tablet 0     No current facility-administered medications for this visit  Allergies   Allergen Reactions    Pollen Extract Nasal Congestion    Tetanus Toxoid Swelling       Review of Systems Data: Vanessa Stephens talked about his goals of iMproving his overall health and decreaSING HIS ANXIETY  He shared due to covid he cant breath in a mask so he no longer is going to the gym  He tried it and he shared he does not like it  He also admitted due to covid he is not eating the best  He shared he is getting multiple health tests and he feels terrible   Regarding goal 2 he feels overall he is controlling his anxiety and just anxious currently about his niece not doing her school work  Assessment: Brittany Yusuf denies self harm thoughts or thoughts to harm others  He is less anxious but admits he is not taking care of his physical health  He did share being able to talk helps  Plan: cONTINUE TO HELP Kassandra SKILL BUILD IN DISTRESS TOLERANCE AND  To progress on his goals  Video Exam    There were no vitals filed for this visit  Physical Exam N/A    I spent 45 minutes directly with the patient during this visit      VIRTUAL VISIT Belinda 47 acknowledges that he has consented to an online visit or consultation  He understands that the online visit is based solely on information provided by him, and that, in the absence of a face-to-face physical evaluation by the physician, the diagnosis he receives is both limited and provisional in terms of accuracy and completeness  This is not intended to replace a full medical face-to-face evaluation by the physician  Juan Savage understands and accepts these terms

## 2021-03-18 ENCOUNTER — TELEPHONE (OUTPATIENT)
Dept: ENDOCRINOLOGY | Facility: CLINIC | Age: 56
End: 2021-03-18

## 2021-03-30 ENCOUNTER — IMMUNIZATIONS (OUTPATIENT)
Dept: FAMILY MEDICINE CLINIC | Facility: HOSPITAL | Age: 56
End: 2021-03-30

## 2021-03-30 DIAGNOSIS — Z23 ENCOUNTER FOR IMMUNIZATION: Primary | ICD-10-CM

## 2021-03-30 PROCEDURE — 0001A SARS-COV-2 / COVID-19 MRNA VACCINE (PFIZER-BIONTECH) 30 MCG: CPT

## 2021-03-30 PROCEDURE — 91300 SARS-COV-2 / COVID-19 MRNA VACCINE (PFIZER-BIONTECH) 30 MCG: CPT

## 2021-04-08 NOTE — PROGRESS NOTES
NEPHROLOGY OFFICE VISIT   Sheryl Simms 48 y o  male MRN: [id number]  4/9/2021    Reason for Visit:  Follow-up    INTERVAL HISTORY and SUBJECTIVE:    I had the pleasure of seeing Laura Posadas today in the renal clinic for the continued management of CKD  He is a 72-year-old male who was followed by Dr Liana Givens and this practitioner for management of CKD and hypertension  He was last seen in the nephrology clinic October 2020  He was pursuing gastric bypass surgery but needed to lose 17 lb prior to surgery  He has since aborted the idea of gastric bypass surgery  Overall he tells me that he is feeling fine  He has no changes in his general health  No unusual edema  No unusual shortness of breath or chest pain  He wears his CPAP nightly  He does not use NSAIDs  Difficult to assess volume status due to body habitus  Last labs obtained on 03/11/2021 reviewed with patient  ASSESSMENT AND PLAN:     1  Chronic kidney disease, stage III with proteinuria:  · Current creatinine 1 78 which is within baseline, e GFR 49   Baseline creatinine 1 6-1 8 mg/dL  ? Urinalysis:  2+ protein, 1-2 RBCs  · Nephrologist:  Dr Liana Givens  · Etiology:  Presumed to be secondary to diabetic nephropathy and possibly component of hypertensive nephrosclerosis: Possible APO-L1 associated nephropathy    · Imaging:  Last renal ultrasound April 2019:  normal size kidneys, right kidney simple cyst   · Plan/recommendations  ? Renal function stable  Creatinine within baseline  No changes to medications  Continue to monitor  ? Follow-up in approximately 4-6 months with Dr Liana Givens  2  Proteinuria:    · Urine protein creatinine ratio 0 74 October 2020  · Etiology:  related likely to diabetic nephropathy but may also be obesity related  · Management- Control of blood pressure and use of ACE-inhibitor  · Recheck urine protein creatinine ratio and urinalysis prior to next appointment  3   Diabetes mellitus type 2 with retinopathy:  Poorly controlled, A1c 8 6%  On Trulicity, insulin  4  Hypertension:    · Previously On lisinopril/HCTZ 20/25 and amlodipine 5 milligrams daily  5  Anemia of chronic disease:    · No CBC obtained but on review of prior records hemoglobin low  · MCV low- start Fe supplement with vitamin-C supplement  · Will recheck iron levels before next appointment along with CBC  6  Electrolytes/acid-base:  Potassium stable, 4 1  Bicarbonate level 27  7  Hyperlipidemia:  Well controlled on Lipitor  Current cholesterol 87, LDL 37, triglycerides 53  8  MBD:   last Phosphorus at goal, current calcium slightly above normal   Corrected calcium 10 2  Vitamin-D level 11 6 October 2020  Patient has not been taking a vitamin-D supplement  Start ergo calciferol weekly times 12 weeks  9  Acid-base:  Bicarbonate acceptable, 25  10  DAISY:  On nightly CPAP      PATIENT INSTRUCTIONS:    Patient Instructions   1  Vitamin-D supplement weekly times 12 weeks this is called ergo calciferol it is vitamin D2  He can also take vitamin D3 which is an over-the-counter vitamin D 1000 units a day  2  Start iron supplement, take it with vitamin-C supplement  You may need to add Colace as a stool softener  Many people get constipated  3  No change in medications at this time  4  Monitor salt intake try to keep sodium intake less than 2000 mg a day  5  Exercise as tolerated  6  Follow-up appointment in approximately 6 months  7  Blood work and urine studies before next appointment in October 8  Please call with any questions or concerns in the meantime  9   Please call us if your blood pressure is consistently greater than 140/80           Orders Placed This Encounter   Procedures    CBC     Standing Status:   Future     Standing Expiration Date:   12/9/2021    Renal function panel     Standing Status:   Future     Standing Expiration Date:   12/9/2021    PTH, intact     Standing Status:   Future     Standing Expiration Date:   12/9/2021    Vitamin D 25 hydroxy Standing Status:   Future     Standing Expiration Date:   12/9/2021    Urinalysis with microscopic     Standing Status:   Future     Standing Expiration Date:   12/9/2021    Protein / creatinine ratio, urine     Standing Status:   Future     Standing Expiration Date:   12/9/2021    Magnesium     Standing Status:   Future     Standing Expiration Date:   12/9/2021       OBJECTIVE:  Current Weight: Weight - Scale: (!) 156 kg (345 lb)  Vitals:    04/09/21 1150 04/09/21 1218 04/09/21 1219   BP:  132/82 132/80   BP Location:  Right arm Left arm   Patient Position:  Sitting Sitting   Cuff Size:  Large Large   Pulse:   82   Weight: (!) 156 kg (345 lb)     Height: 5' 6" (1 676 m)      Body mass index is 55 68 kg/m²  REVIEW OF SYSTEMS:    Review of Systems   Constitutional: Negative for appetite change, fatigue, fever and unexpected weight change  HENT: Negative for congestion  Eyes: Negative for visual disturbance  Respiratory: Negative for cough and shortness of breath  Cardiovascular: Negative for chest pain, palpitations and leg swelling  Gastrointestinal: Negative for abdominal distention, constipation, diarrhea, nausea and vomiting  Genitourinary: Negative for difficulty urinating  Musculoskeletal: Positive for arthralgias  Skin: Negative for color change, rash and wound  Neurological: Negative for dizziness, weakness, light-headedness and headaches  PHYSICAL EXAM:      Physical Exam  Constitutional:       General: He is not in acute distress  Appearance: He is obese  He is not ill-appearing, toxic-appearing or diaphoretic  HENT:      Head: Atraumatic  Nose: No congestion  Mouth/Throat:      Comments: Examination deferred wearing a mask  Eyes:      General: No scleral icterus  Extraocular Movements: Extraocular movements intact  Conjunctiva/sclera: Conjunctivae normal    Neck:      Musculoskeletal: Neck supple  Vascular: No carotid bruit  Cardiovascular:      Rate and Rhythm: Normal rate and regular rhythm  Heart sounds: No murmur  Friction rub present  No gallop  Pulmonary:      Effort: Pulmonary effort is normal  No respiratory distress  Breath sounds: Normal breath sounds  No stridor  No wheezing, rhonchi or rales  Abdominal:      General: There is no distension  Palpations: Abdomen is soft  Tenderness: There is no abdominal tenderness  Musculoskeletal:      Right lower leg: No edema  Left lower leg: No edema  Skin:     General: Skin is warm and dry  Coloration: Skin is not jaundiced or pale  Findings: No erythema or rash  Neurological:      Mental Status: He is alert and oriented to person, place, and time  Psychiatric:         Mood and Affect: Mood normal          Behavior: Behavior normal          Thought Content:  Thought content normal          Judgment: Judgment normal          Medications:    Current Outpatient Medications:     amLODIPine (NORVASC) 5 mg tablet, TAKE 1 TABLET(5 MG) BY MOUTH DAILY, Disp: 90 tablet, Rfl: 1    ammonium lactate (LAC-HYDRIN) 12 % cream, Apply topically as needed for dry skin, Disp: 385 g, Rfl: 0    atorvastatin (LIPITOR) 10 mg tablet, TAKE 1 TABLET(10 MG) BY MOUTH DAILY, Disp: 90 tablet, Rfl: 1    benztropine (COGENTIN) 1 mg tablet, Take 1 tablet (1 mg total) by mouth daily as needed for tremors (Restlessness), Disp: 90 tablet, Rfl: 0    Blood Glucose Monitoring Suppl (FREESTYLE LITE) ANTONIA, by Does not apply route 3 (three) times a day, Disp: 1 each, Rfl: 0    buPROPion (WELLBUTRIN) 100 mg tablet, Take 1 tablet (100 mg total) by mouth daily, Disp: 90 tablet, Rfl: 0    busPIRone (BUSPAR) 15 mg tablet, Take 1 tablet (15 mg total) by mouth 2 (two) times a day, Disp: 180 tablet, Rfl: 0    clonazePAM (KlonoPIN) 1 mg tablet, Take 1 tab po qd prn anxiety or panic attacks, Disp: 30 tablet, Rfl: 2    Dulaglutide (Trulicity) 1 5 GI/1 7BK SOPN, Inject 1 5 mg under the skin 1 time a week, Disp: 12 pen, Rfl: 1    gabapentin (NEURONTIN) 300 mg capsule, TAKE 1 CAPSULE(300 MG) BY MOUTH TWICE DAILY, Disp: 180 capsule, Rfl: 1    glucose blood (FREESTYLE LITE) test strip, CHECK BLOOD SUGARS FOUR TIMES DAILY, Disp: 400 each, Rfl: 1    insulin lispro (HumaLOG) 100 units/mL injection pen, INJECT 28 UNITS THREE TIMES DAILY WITH MEALS PLUS SCALE(UP  UNITS DAILY), Disp: 30 pen, Rfl: 0    Insulin Pen Needle (B-D ULTRAFINE III SHORT PEN) 31G X 8 MM MISC, by Does not apply route, Disp: , Rfl:     INSULIN SYRINGE  5CC/29G 29G X 1/2" 0 5 ML MISC, by Does not apply route, Disp: , Rfl:     Lancets (FREESTYLE) lancets, USE THREE TIMES DAILY AS DIRECTED, Disp: 300 each, Rfl: 0    LANTUS SOLOSTAR 100 units/mL injection pen, INJECT 50 UNITS UNDER THE SKIN EVERY 12 HOURS, Disp: 5 pen, Rfl: 1    lisinopril-hydrochlorothiazide (PRINZIDE,ZESTORETIC) 20-25 MG per tablet, TAKE 1 TABLET BY MOUTH DAILY, Disp: 90 tablet, Rfl: 1    lurasidone (Latuda) 120 mg tablet, Take 1 tablet (120 mg total) by mouth daily with dinner, Disp: 90 tablet, Rfl: 0    omeprazole (PriLOSEC) 20 mg delayed release capsule, TAKE 1 CAPSULE(20 MG) BY MOUTH DAILY, Disp: 90 capsule, Rfl: 1    traZODone (DESYREL) 50 mg tablet, Take 1 tablet (50 mg total) by mouth daily at bedtime as needed for sleep, Disp: 90 tablet, Rfl: 0    ascorbic acid (VITAMIN C) 250 mg tablet, Take 1 tablet (250 mg total) by mouth daily, Disp: 90 tablet, Rfl: 3    docusate sodium (COLACE) 100 mg capsule, Take 1 capsule (100 mg total) by mouth daily, Disp: 30 capsule, Rfl: 3    ergocalciferol (VITAMIN D2) 50,000 units, Take 1 capsule (50,000 Units total) by mouth once a week, Disp: 12 capsule, Rfl: 0    ferrous sulfate (EQL Iron Supplement Therapy) 325 (65 Fe) mg tablet, Take 1 tablet (325 mg total) by mouth daily with breakfast, Disp: 90 tablet, Rfl: 2    Laboratory Results:        Invalid input(s): ALBUMIN    Results for orders placed or performed in visit on 03/11/21   Hemoglobin A1C   Result Value Ref Range    Hemoglobin A1C 8 6 (H) Normal 3 8-5 6%; PreDiabetic 5 7-6 4%;  Diabetic >=6 5%; Glycemic control for adults with diabetes <7 0% %     mg/dl

## 2021-04-09 ENCOUNTER — OFFICE VISIT (OUTPATIENT)
Dept: NEPHROLOGY | Facility: CLINIC | Age: 56
End: 2021-04-09
Payer: MEDICARE

## 2021-04-09 VITALS
DIASTOLIC BLOOD PRESSURE: 80 MMHG | SYSTOLIC BLOOD PRESSURE: 132 MMHG | HEART RATE: 82 BPM | HEIGHT: 66 IN | WEIGHT: 315 LBS | BODY MASS INDEX: 50.62 KG/M2

## 2021-04-09 DIAGNOSIS — N18.30 BENIGN HYPERTENSION WITH CKD (CHRONIC KIDNEY DISEASE) STAGE III (HCC): ICD-10-CM

## 2021-04-09 DIAGNOSIS — I12.9 BENIGN HYPERTENSION WITH CKD (CHRONIC KIDNEY DISEASE) STAGE III (HCC): ICD-10-CM

## 2021-04-09 DIAGNOSIS — E78.2 MIXED HYPERLIPIDEMIA: ICD-10-CM

## 2021-04-09 DIAGNOSIS — N18.31 ANEMIA DUE TO STAGE 3A CHRONIC KIDNEY DISEASE (HCC): ICD-10-CM

## 2021-04-09 DIAGNOSIS — D63.1 ANEMIA DUE TO STAGE 3A CHRONIC KIDNEY DISEASE (HCC): ICD-10-CM

## 2021-04-09 DIAGNOSIS — R80.1 PERSISTENT PROTEINURIA: ICD-10-CM

## 2021-04-09 DIAGNOSIS — G47.33 OSA (OBSTRUCTIVE SLEEP APNEA): ICD-10-CM

## 2021-04-09 DIAGNOSIS — K21.9 GASTROESOPHAGEAL REFLUX DISEASE, UNSPECIFIED WHETHER ESOPHAGITIS PRESENT: ICD-10-CM

## 2021-04-09 DIAGNOSIS — N18.31 STAGE 3A CHRONIC KIDNEY DISEASE (HCC): Primary | ICD-10-CM

## 2021-04-09 DIAGNOSIS — E55.9 VITAMIN D DEFICIENCY: ICD-10-CM

## 2021-04-09 PROCEDURE — 99214 OFFICE O/P EST MOD 30 MIN: CPT | Performed by: NURSE PRACTITIONER

## 2021-04-09 RX ORDER — FERROUS SULFATE 325(65) MG
325 TABLET ORAL
Qty: 90 TABLET | Refills: 2 | Status: SHIPPED | OUTPATIENT
Start: 2021-04-09 | End: 2021-09-14 | Stop reason: ALTCHOICE

## 2021-04-09 RX ORDER — MULTIVIT WITH MINERALS/LUTEIN
250 TABLET ORAL DAILY
Qty: 90 TABLET | Refills: 3 | Status: SHIPPED | OUTPATIENT
Start: 2021-04-09 | End: 2021-09-14 | Stop reason: ALTCHOICE

## 2021-04-09 RX ORDER — ERGOCALCIFEROL 1.25 MG/1
50000 CAPSULE ORAL WEEKLY
Qty: 12 CAPSULE | Refills: 0 | Status: SHIPPED | OUTPATIENT
Start: 2021-04-09 | End: 2021-09-14 | Stop reason: ALTCHOICE

## 2021-04-09 RX ORDER — DOCUSATE SODIUM 100 MG/1
100 CAPSULE, LIQUID FILLED ORAL DAILY
Qty: 30 CAPSULE | Refills: 3 | Status: SHIPPED | OUTPATIENT
Start: 2021-04-09 | End: 2021-09-14 | Stop reason: ALTCHOICE

## 2021-04-09 NOTE — PATIENT INSTRUCTIONS
1  Vitamin-D supplement weekly times 12 weeks this is called ergo calciferol it is vitamin D2  He can also take vitamin D3 which is an over-the-counter vitamin D 1000 units a day  2  Start iron supplement, take it with vitamin-C supplement  You may need to add Colace as a stool softener  Many people get constipated  3  No change in medications at this time  4  Monitor salt intake try to keep sodium intake less than 2000 mg a day  5  Exercise as tolerated  6  Follow-up appointment in approximately 6 months  7  Blood work and urine studies before next appointment in October 8  Please call with any questions or concerns in the meantime  9   Please call us if your blood pressure is consistently greater than 140/80
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
## 2021-04-15 ENCOUNTER — OFFICE VISIT (OUTPATIENT)
Dept: PSYCHIATRY | Facility: CLINIC | Age: 56
End: 2021-04-15
Payer: MEDICARE

## 2021-04-15 DIAGNOSIS — R29.818 EXTRAPYRAMIDAL SYMPTOM: ICD-10-CM

## 2021-04-15 DIAGNOSIS — G47.00 INSOMNIA, UNSPECIFIED TYPE: ICD-10-CM

## 2021-04-15 DIAGNOSIS — F41.0 PANIC ATTACKS: ICD-10-CM

## 2021-04-15 DIAGNOSIS — F41.1 GENERALIZED ANXIETY DISORDER: Primary | Chronic | ICD-10-CM

## 2021-04-15 DIAGNOSIS — F31.61 BIPOLAR DISORDER, CURRENT EPISODE MIXED, MILD (HCC): ICD-10-CM

## 2021-04-15 PROCEDURE — 99213 OFFICE O/P EST LOW 20 MIN: CPT | Performed by: PHYSICIAN ASSISTANT

## 2021-04-15 RX ORDER — BUSPIRONE HYDROCHLORIDE 15 MG/1
15 TABLET ORAL 2 TIMES DAILY
Qty: 180 TABLET | Refills: 0 | Status: SHIPPED | OUTPATIENT
Start: 2021-04-15 | End: 2021-07-01 | Stop reason: SDUPTHER

## 2021-04-15 RX ORDER — TRAZODONE HYDROCHLORIDE 50 MG/1
50 TABLET ORAL
Qty: 90 TABLET | Refills: 0 | Status: SHIPPED | OUTPATIENT
Start: 2021-04-15 | End: 2021-07-01 | Stop reason: SDUPTHER

## 2021-04-15 RX ORDER — BUPROPION HYDROCHLORIDE 100 MG/1
100 TABLET ORAL DAILY
Qty: 90 TABLET | Refills: 0 | Status: SHIPPED | OUTPATIENT
Start: 2021-04-15 | End: 2021-07-01 | Stop reason: SDUPTHER

## 2021-04-15 RX ORDER — CLONAZEPAM 1 MG/1
TABLET ORAL
Qty: 30 TABLET | Refills: 2 | Status: SHIPPED | OUTPATIENT
Start: 2021-04-15 | End: 2021-07-01 | Stop reason: SDUPTHER

## 2021-04-18 DIAGNOSIS — R29.818 EXTRAPYRAMIDAL SYMPTOM: ICD-10-CM

## 2021-04-19 RX ORDER — BENZTROPINE MESYLATE 1 MG/1
TABLET ORAL
Qty: 90 TABLET | Refills: 0 | Status: SHIPPED | OUTPATIENT
Start: 2021-04-19 | End: 2021-07-01 | Stop reason: SDUPTHER

## 2021-04-24 ENCOUNTER — IMMUNIZATIONS (OUTPATIENT)
Dept: FAMILY MEDICINE CLINIC | Facility: HOSPITAL | Age: 56
End: 2021-04-24

## 2021-04-24 DIAGNOSIS — Z23 ENCOUNTER FOR IMMUNIZATION: Primary | ICD-10-CM

## 2021-04-24 PROCEDURE — 0002A SARS-COV-2 / COVID-19 MRNA VACCINE (PFIZER-BIONTECH) 30 MCG: CPT

## 2021-04-24 PROCEDURE — 91300 SARS-COV-2 / COVID-19 MRNA VACCINE (PFIZER-BIONTECH) 30 MCG: CPT

## 2021-04-26 ENCOUNTER — TELEMEDICINE (OUTPATIENT)
Dept: INTERNAL MEDICINE CLINIC | Facility: CLINIC | Age: 56
End: 2021-04-26

## 2021-04-26 DIAGNOSIS — R05.9 COUGH: Primary | ICD-10-CM

## 2021-04-26 PROCEDURE — 99213 OFFICE O/P EST LOW 20 MIN: CPT | Performed by: INTERNAL MEDICINE

## 2021-04-26 NOTE — PROGRESS NOTES
COVID-19 Outpatient Progress Note    Assessment/Plan:    Problem List Items Addressed This Visit     None      Visit Diagnoses     Cough    -  Primary    Relevant Orders    Novel Coronavirus (Covid-19),PCR [facility] - Collected at [facility] or [facility]         Disposition:     I referred patient to one of our centralized sites for a COVID-19 swab  Continue isolation until testing results come back  Maintain social distancing  Practice proper hand hygiene  Continue to wear mask when around other individuals  Follow-up in 2 days via virtual visit  I have spent 10 minutes directly with the patient  Greater than 50% of this time was spent in counseling/coordination of care regarding: instructions for management, patient and family education and risk factor reductions  Encounter provider Donya Valero DO    Provider located at [address] [place] [address] Road  [id number] [place] [id number] [phone]    Recent Visits  No visits were found meeting these conditions  Showing recent visits within past 7 days and meeting all other requirements     Today's Visits  Date Type Provider Dept   04/26/21 [facility] today's visits and meeting all other requirements     Future Appointments  No visits were found meeting these conditions  Showing future appointments within next 150 days and meeting all other requirements      This virtual check-in was done via [facility] and patient was informed that this is a secure, HIPAA-compliant platform  He agrees to proceed  Patient agrees to participate in a virtual check in via telephone or video visit instead of presenting to the office to address urgent/immediate medical needs  Patient is aware this is a billable service  After connecting through [facility], the patient was identified by name and date of birth   Jarett Gabriela was informed that this was a telemedicine visit and that the exam was being conducted confidentially over secure lines  My office door was closed  No one else was in the room  Maxim Salas acknowledged consent and understanding of privacy and security of the telemedicine visit  I informed the patient that I have reviewed his record in Epic and presented the opportunity for him to ask any questions regarding the visit today  The patient agreed to participate  Subjective:   Maxim Salas is a 48 y o  male who is concerned about COVID-19  Patient's symptoms include nasal congestion, rhinorrhea and cough  Patient denies fever, chills, sore throat, anosmia, loss of taste, shortness of breath, abdominal pain, nausea, vomiting, diarrhea and headaches  Date of symptom onset: 4/24/2021  Date of exposure: 4/19/2021    Exposure:   Contact with a person who is under investigation (PUI) for or who is positive for COVID-19 within the last 14 days?: Yes    No results found for: Huber Ceballos  Past Medical History:   Diagnosis Date    ADHD, adult residual type     Anxiety     Anxiety     Bipolar 1 disorder ([facility] [address] )     Cataract     Left eye    Depression     Depression     Diabetes mellitus ([facility] [address] )     blood sugar 167 on admission    Equinus deformity of foot     GERD (gastroesophageal reflux disease)     Homicidal ideations     Hyperlipidemia     Hypertension     Microalbuminuria     Morbid obesity ([address] )     Neuropathy     Shortness of breath     Sleep apnea     CPAP at bedtime    Sleep apnea     Stroke ([facility] [address] )     Suicidal intent      Past Surgical History:   Procedure Laterality Date    CATARACT EXTRACTION      CATARACT EXTRACTION      HAND SURGERY Right     OTHER SURGICAL HISTORY      REPAIR OF SUPERFICIAL WOUND FACE    [place] ESOPHAGOGASTRODUODENOSCOPY TRANSORAL DIAGNOSTIC N/A 6/14/2018    Procedure: ESOPHAGOGASTRODUODENOSCOPY (EGD);   Surgeon: Huey Matamoros MD;  Location: [facility]; Service: Gastroenterology    [place] ESOPHAGOGASTRODUODENOSCOPY TRANSORAL DIAGNOSTIC N/A 9/12/2018    Procedure: EGD AND COLONOSCOPY;  Surgeon: Miguel Angel Capellan MD;  Location: [facility];   Service: Gastroenterology     Current Outpatient Medications   Medication Sig Dispense Refill    amLODIPine (NORVASC) 5 mg tablet TAKE 1 TABLET(5 MG) BY MOUTH DAILY 90 tablet 1    ammonium lactate (LAC-HYDRIN) 12 % cream Apply topically as needed for dry skin 385 g 0    ascorbic acid (VITAMIN C) 250 mg tablet Take 1 tablet (250 mg total) by mouth daily 90 tablet 3    atorvastatin (LIPITOR) 10 mg tablet TAKE 1 TABLET(10 MG) BY MOUTH DAILY 90 tablet 1    benztropine (COGENTIN) 1 mg tablet TAKE 1 TABLET(1 MG TOTAL) BY MOUTH DAILY AS NEEDED FOR TREMORS 90 tablet 0    Blood Glucose Monitoring Suppl (FREESTYLE LITE) ANTONIA by Does not apply route 3 (three) times a day 1 each 0    buPROPion (WELLBUTRIN) 100 mg tablet Take 1 tablet (100 mg total) by mouth daily 90 tablet 0    busPIRone (BUSPAR) 15 mg tablet Take 1 tablet (15 mg total) by mouth 2 (two) times a day 180 tablet 0    clonazePAM (KlonoPIN) 1 mg tablet Take 1 tab po qd prn anxiety or panic attacks 30 tablet 2    Dulaglutide (Trulicity) 1 5 QL/9 8JP SOPN Inject 1 5 mg under the skin 1 time a week 12 pen 1    ergocalciferol (VITAMIN D2) 50,000 units Take 1 capsule (50,000 Units total) by mouth once a week 12 capsule 0    gabapentin (NEURONTIN) 300 mg capsule TAKE 1 CAPSULE(300 MG) BY MOUTH TWICE DAILY 180 capsule 1    glucose blood (FREESTYLE LITE) test strip CHECK BLOOD SUGARS FOUR TIMES DAILY 400 each 1    insulin lispro (HumaLOG) 100 units/mL injection pen INJECT 28 UNITS THREE TIMES DAILY WITH MEALS PLUS SCALE(UP  UNITS DAILY) 30 pen 0    Insulin Pen Needle (B-D ULTRAFINE III SHORT PEN) 31G X 8 MM MISC by Does not apply route      INSULIN SYRINGE  5CC/29G 29G X 1/2" 0 5 ML MISC by Does not apply route      Lancets (FREESTYLE) lancets USE THREE TIMES DAILY AS DIRECTED 300 each 0    LANTUS SOLOSTAR 100 units/mL injection pen INJECT 50 UNITS UNDER THE SKIN EVERY 12 HOURS 5 pen 1    lisinopril-hydrochlorothiazide (PRINZIDE,ZESTORETIC) 20-25 MG per tablet TAKE 1 TABLET BY MOUTH DAILY 90 tablet 1    lurasidone (Latuda) 120 mg tablet Take 1 tablet (120 mg total) by mouth daily with dinner 90 tablet 0    omeprazole (PriLOSEC) 20 mg delayed release capsule TAKE 1 CAPSULE(20 MG) BY MOUTH DAILY 90 capsule 1    traZODone (DESYREL) 50 mg tablet Take 1 tablet (50 mg total) by mouth daily at bedtime as needed for sleep 90 tablet 0    docusate sodium (COLACE) 100 mg capsule Take 1 capsule (100 mg total) by mouth daily (Patient not taking: Reported on 4/26/2021) 30 capsule 3    ferrous sulfate (EQL Iron Supplement Therapy) 325 (65 Fe) mg tablet Take 1 tablet (325 mg total) by mouth daily with breakfast (Patient not taking: Reported on 4/26/2021) 90 tablet 2     No current facility-administered medications for this visit  Allergies   Allergen Reactions    Pollen Extract Nasal Congestion    Tetanus Toxoid Swelling       Review of Systems   Constitutional: Negative for chills and fever  HENT: Positive for congestion and rhinorrhea  Negative for sore throat  Eyes: Negative  Respiratory: Positive for cough  Negative for shortness of breath  Cardiovascular: Negative  Gastrointestinal: Negative for abdominal pain, diarrhea, nausea and vomiting  Genitourinary: Negative  Musculoskeletal: Negative  Neurological: Negative for light-headedness and headaches  Psychiatric/Behavioral: Negative  Objective:    Vitals:       Physical Exam  Constitutional:       General: He is not in acute distress  Appearance: Normal appearance  He is obese  He is not ill-appearing, toxic-appearing or diaphoretic  Comments: This is a virtual visit   HENT:      Head: Normocephalic and atraumatic     Pulmonary:      Effort: Pulmonary effort is normal  No respiratory distress  Neurological:      Mental Status: He is alert and oriented to person, place, and time  VIRTUAL VISIT DISCLAIMER    Jr Mccracken acknowledges that he has consented to an online visit or consultation  He understands that the online visit is based solely on information provided by him, and that, in the absence of a face-to-face physical evaluation by the physician, the diagnosis he receives is both limited and provisional in terms of accuracy and completeness  This is not intended to replace a full medical face-to-face evaluation by the physician  Jr Mccracken understands and accepts these terms      DO Angela Kellogg Internal Medicine PGY-1

## 2021-04-28 DIAGNOSIS — R05.9 COUGH: ICD-10-CM

## 2021-04-28 PROCEDURE — U0003 INFECTIOUS AGENT DETECTION BY NUCLEIC ACID (DNA OR RNA); SEVERE ACUTE RESPIRATORY SYNDROME CORONAVIRUS 2 (SARS-COV-2) (CORONAVIRUS DISEASE [COVID-19]), AMPLIFIED PROBE TECHNIQUE, MAKING USE OF HIGH THROUGHPUT TECHNOLOGIES AS DESCRIBED BY CMS-2020-01-R: HCPCS | Performed by: STUDENT IN AN ORGANIZED HEALTH CARE EDUCATION/TRAINING PROGRAM

## 2021-04-28 PROCEDURE — U0005 INFEC AGEN DETEC AMPLI PROBE: HCPCS | Performed by: STUDENT IN AN ORGANIZED HEALTH CARE EDUCATION/TRAINING PROGRAM

## 2021-04-29 LAB — SARS-COV-2 RNA RESP QL NAA+PROBE: POSITIVE

## 2021-04-30 ENCOUNTER — TELEMEDICINE (OUTPATIENT)
Dept: INTERNAL MEDICINE CLINIC | Facility: CLINIC | Age: 56
End: 2021-04-30

## 2021-04-30 DIAGNOSIS — R05.9 COUGH IN ADULT: ICD-10-CM

## 2021-04-30 DIAGNOSIS — U07.1 COVID-19 VIRUS INFECTION: Primary | ICD-10-CM

## 2021-04-30 DIAGNOSIS — R53.83 TIRED: ICD-10-CM

## 2021-04-30 PROCEDURE — 99214 OFFICE O/P EST MOD 30 MIN: CPT | Performed by: PHYSICIAN ASSISTANT

## 2021-04-30 RX ORDER — BENZONATATE 100 MG/1
100 CAPSULE ORAL 3 TIMES DAILY PRN
Qty: 20 CAPSULE | Refills: 0 | Status: SHIPPED | OUTPATIENT
Start: 2021-04-30 | End: 2021-09-14 | Stop reason: ALTCHOICE

## 2021-04-30 NOTE — PROGRESS NOTES
COVID-19 Outpatient Progress Note    Assessment/Plan:    Problem List Items Addressed This Visit     None      Visit Diagnoses     COVID-19 virus infection    -  Primary    Relevant Medications    benzonatate (TESSALON PERLES) 100 mg capsule    Cough in adult        Relevant Medications    benzonatate (TESSALON PERLES) 100 mg capsule    Tired             Disposition:     I recommended continued isolation until at least 24 hours have passed since recovery defined as resolution of fever without the use of fever-reducing medications AND improvement in COVID symptoms AND 10 days have passed since onset of symptoms (or 10 days have passed since date of first positive viral diagnostic test for asymptomatic patients)  On your virtual visit today we discussed that your COVID test has returned positive  You also confirm direct contact with your grandchildren who tested positive first   You do note improvement in your symptoms over the past few days  You do confirm you still have a persistent dry cough but it is not affecting your sleep  You deny fever, chills no nausea vomiting diarrhea  You confirm no loss of taste or smell  You report that you are drinking a lot of liquids and that you have had no change to your appetite  You also confirm that you are still taking her insulin although we did review that your sugar was significantly elevated last night but you checked it shortly after eating  As you are eating her regular meals at times no change to your insulin or diabetic medications needed at this time  For your cough I have sent a prescription for Tessalon  You do confirm you have someone who can pick it up and drop it off  You are aware that person cannot and to your home  We discussed per guidelines you will need to remain under quarantine for the full 10 days but we are going to get you scheduled for a follow-up virtual visit to monitor your symptoms      We did discuss however over the weekend should you develop any shortness of breath or difficulty breathing you are to call 911 and go to the emergency room right away  Otherwise contact our office if you have any additional questions or concerns  I have spent 16 minutes directly with the patient  Greater than 50% of this time was spent in counseling/coordination of care regarding: diagnostic results, prognosis, risks and benefits of treatment options, instructions for management, patient and family education, importance of treatment compliance and risk factor reductions  Encounter provider Deronda Boas, PA-C    Provider located at [address] [place] [id number]  [phone]    Recent Visits  Date Type Provider Dept   04/26/21 Telemedicine [facility], [address] [place] [address] recent visits within past 7 days and meeting all other requirements     Today's Visits  Date Type Provider Dept   04/30/21 Telemedicine Deronda Boas, [facility] today's visits and meeting all other requirements     Future Appointments  No visits were found meeting these conditions  Showing future appointments within next 150 days and meeting all other requirements      This virtual check-in was done via [employer] and patient was informed that this is a secure, HIPAA-compliant platform  He agrees to proceed  Patient agrees to participate in a virtual check in via telephone or video visit instead of presenting to the office to address urgent/immediate medical needs  Patient is aware this is a billable service  After connecting through [facility], the patient was identified by name and date of birth  Cosme Bai was informed that this was a telemedicine visit and that the exam was being conducted confidentially over secure lines  My office door was closed  No one else was in the room   Cosme Bai acknowledged consent and understanding of privacy and security of the telemedicine visit  I informed the patient that I have reviewed his record in Epic and presented the opportunity for him to ask any questions regarding the visit today  The patient agreed to participate  Subjective:   Erasto Ramos is a 48 y o  male who has been screened for COVID-19  Patient's symptoms include cough  Patient denies fever, chills, anosmia, loss of taste, shortness of breath, chest tightness, nausea, vomiting, diarrhea, myalgias and headaches  Griffin Carnes has been staying home and has isolated themselves in his home  He is taking care to not share personal items and is cleaning all surfaces that are touched often, like counters, tabletops, and doorknobs using household cleaning sprays or wipes  He is wearing a mask when he leaves his room  Date of symptom onset: 4/24/2021  Date of exposure: 4/19/2021  Date of positive COVID-19 PCR: 4/28/2021      This is 1st virtual follow-up visit for patient who tested positive for COVID on April 28th  Overall feeling a bit better but still has dry cough, not keeping him awake  States is drinking gingerale, tea, water  Eating well no change to appetite  Is using his insulin   Sugar last night 314 after dinner, did not check this am    Patient did not have his diabetic testing supplies near him and was outside with the grandchildren  Patient advised to make sure that he is checking his sugars at least 2-3 times daily      Grandkids were positive first and feeling well now  Wife still has cough but also improving  Patient does meet criteria for monoclonal antibody but does not wish to receive at this time as he feels his symptoms are improving well        Lab Results   Component Value Date    SARSCOV2 Positive (A) 04/28/2021     Past Medical History:   Diagnosis Date    ADHD, adult residual type     Anxiety     Anxiety     Bipolar 1 disorder (HCC)     Cataract     Left eye    Depression     Depression     Diabetes mellitus ([facility] )     blood sugar 167 on admission    Equinus deformity of foot     GERD (gastroesophageal reflux disease)     Homicidal ideations     Hyperlipidemia     Hypertension     Microalbuminuria     Morbid obesity (HCC)     Neuropathy     Shortness of breath     Sleep apnea     CPAP at bedtime    Sleep apnea     Stroke ([facility] )     Suicidal intent      Past Surgical History:   Procedure Laterality Date    CATARACT EXTRACTION      CATARACT EXTRACTION      HAND SURGERY Right     OTHER SURGICAL HISTORY      REPAIR OF SUPERFICIAL WOUND FACE    IN ESOPHAGOGASTRODUODENOSCOPY TRANSORAL DIAGNOSTIC N/A 6/14/2018    Procedure: ESOPHAGOGASTRODUODENOSCOPY (EGD); Surgeon: Joya Arguello MD;  [facility]; Service: Gastroenterology    IN ESOPHAGOGASTRODUODENOSCOPY TRANSORAL DIAGNOSTIC N/A 9/12/2018    Procedure: EGD AND COLONOSCOPY;  Surgeon: Joya Arguello MD;  [facility];   Service: Gastroenterology     Current Outpatient Medications   Medication Sig Dispense Refill    amLODIPine (NORVASC) 5 mg tablet TAKE 1 TABLET(5 MG) BY MOUTH DAILY 90 tablet 1    ammonium lactate (LAC-HYDRIN) 12 % cream Apply topically as needed for dry skin 385 g 0    ascorbic acid (VITAMIN C) 250 mg tablet Take 1 tablet (250 mg total) by mouth daily 90 tablet 3    atorvastatin (LIPITOR) 10 mg tablet TAKE 1 TABLET(10 MG) BY MOUTH DAILY 90 tablet 1    benztropine (COGENTIN) 1 mg tablet TAKE 1 TABLET(1 MG TOTAL) BY MOUTH DAILY AS NEEDED FOR TREMORS 90 tablet 0    Blood Glucose Monitoring Suppl (FREESTYLE LITE) ANTONIA by Does not apply route 3 (three) times a day 1 each 0    buPROPion (WELLBUTRIN) 100 mg tablet Take 1 tablet (100 mg total) by mouth daily 90 tablet 0    busPIRone (BUSPAR) 15 mg tablet Take 1 tablet (15 mg total) by mouth 2 (two) times a day 180 tablet 0    clonazePAM (KlonoPIN) 1 mg tablet Take 1 tab po qd prn anxiety or panic attacks 30 tablet 2    Dulaglutide (Trulicity) 1 5 MG/0 5ML SOPN Inject 1 5 mg under the skin 1 time a week 12 pen 1    ergocalciferol (VITAMIN D2) 50,000 units Take 1 capsule (50,000 Units total) by mouth once a week 12 capsule 0    ferrous sulfate (EQL Iron Supplement Therapy) 325 (65 Fe) mg tablet Take 1 tablet (325 mg total) by mouth daily with breakfast 90 tablet 2    gabapentin (NEURONTIN) 300 mg capsule TAKE 1 CAPSULE(300 MG) BY MOUTH TWICE DAILY 180 capsule 1    glucose blood (FREESTYLE LITE) test strip CHECK BLOOD SUGARS FOUR TIMES DAILY 400 each 1    insulin lispro (HumaLOG) 100 units/mL injection pen INJECT 28 UNITS THREE TIMES DAILY WITH MEALS PLUS SCALE(UP  UNITS DAILY) 30 pen 0    Insulin Pen Needle (B-D ULTRAFINE III SHORT PEN) 31G X 8 MM MISC by Does not apply route      INSULIN SYRINGE  5CC/29G 29G X 1/2" 0 5 ML MISC by Does not apply route      Lancets (FREESTYLE) lancets USE THREE TIMES DAILY AS DIRECTED 300 each 0    LANTUS SOLOSTAR 100 units/mL injection pen INJECT 50 UNITS UNDER THE SKIN EVERY 12 HOURS 5 pen 1    lisinopril-hydrochlorothiazide (PRINZIDE,ZESTORETIC) 20-25 MG per tablet TAKE 1 TABLET BY MOUTH DAILY 90 tablet 1    lurasidone (Latuda) 120 mg tablet Take 1 tablet (120 mg total) by mouth daily with dinner 90 tablet 0    omeprazole (PriLOSEC) 20 mg delayed release capsule TAKE 1 CAPSULE(20 MG) BY MOUTH DAILY 90 capsule 1    traZODone (DESYREL) 50 mg tablet Take 1 tablet (50 mg total) by mouth daily at bedtime as needed for sleep 90 tablet 0    benzonatate (TESSALON PERLES) 100 mg capsule Take 1 capsule (100 mg total) by mouth 3 (three) times a day as needed for cough 20 capsule 0    docusate sodium (COLACE) 100 mg capsule Take 1 capsule (100 mg total) by mouth daily (Patient not taking: Reported on 4/26/2021) 30 capsule 3     No current facility-administered medications for this visit        Allergies   Allergen Reactions    Pollen Extract Nasal Congestion    Tetanus Toxoid Swelling       Review of Systems Constitutional: Negative for chills and fever  Respiratory: Positive for cough  Negative for chest tightness and shortness of breath  Gastrointestinal: Negative for diarrhea, nausea and vomiting  Musculoskeletal: Negative for myalgias  Neurological: Negative for headaches  Objective:    Vitals:       Physical Exam  Nursing note reviewed  Constitutional:       General: He is not in acute distress  Appearance: He is obese  He is not ill-appearing or diaphoretic  Comments: Patient appears slightly tired but in no acute distress  HENT:      Head: Normocephalic  Eyes:      Conjunctiva/sclera: Conjunctivae normal    Neck:      Musculoskeletal: Neck supple  Pulmonary:      Effort: Pulmonary effort is normal  No respiratory distress  Comments: Patient was able to complete virtual visit without difficulty  No gasping for air  Patient was walking around in moving during his visit did not have to stop to catch his breath  Patient did have 1 episode of cough that lasted 1 or 2 seconds without distress  Neurological:      Mental Status: He is alert  Mental status is at baseline  Psychiatric:         Mood and Affect: Mood normal        VIRTUAL VISIT DISCLAIMER    Sheryl Simms acknowledges that he has consented to an online visit or consultation  He understands that the online visit is based solely on information provided by him, and that, in the absence of a face-to-face physical evaluation by the physician, the diagnosis he receives is both limited and provisional in terms of accuracy and completeness  This is not intended to replace a full medical face-to-face evaluation by the physician  Sheryl Simms understands and accepts these terms

## 2021-05-04 ENCOUNTER — TELEMEDICINE (OUTPATIENT)
Dept: INTERNAL MEDICINE CLINIC | Facility: CLINIC | Age: 56
End: 2021-05-04

## 2021-05-04 DIAGNOSIS — Z79.4 TYPE 2 DIABETES MELLITUS WITH HYPERGLYCEMIA, WITH LONG-TERM CURRENT USE OF INSULIN (HCC): ICD-10-CM

## 2021-05-04 DIAGNOSIS — E11.65 TYPE 2 DIABETES MELLITUS WITH HYPERGLYCEMIA, WITH LONG-TERM CURRENT USE OF INSULIN (HCC): ICD-10-CM

## 2021-05-04 DIAGNOSIS — U07.1 COVID-19 VIRUS INFECTION: Primary | ICD-10-CM

## 2021-05-04 PROCEDURE — 99212 OFFICE O/P EST SF 10 MIN: CPT | Performed by: PHYSICIAN ASSISTANT

## 2021-05-04 NOTE — PROGRESS NOTES
COVID-19 Outpatient Progress Note    Assessment/Plan:    Problem List Items Addressed This Visit        Endocrine    Type 2 diabetes mellitus with hyperglycemia, with long-term current use of insulin ([facility] )      Other Visit Diagnoses     COVID-19 virus infection    -  Primary         Disposition:     I recommended continued isolation until at least 24 hours have passed since recovery defined as resolution of fever without the use of fever-reducing medications AND improvement in COVID symptoms AND 10 days have passed since onset of symptoms (or 10 days have passed since date of first positive viral diagnostic test for asymptomatic patients)  On your virtual follow-up visit today for COVID-19 we discussed that your symptoms have continued to improve  You know minimal cough and not frequently  You report no other symptoms including no fever no nausea vomiting or diarrhea  You confirm that you are eating, drinking and sleeping without difficulty  We discussed today is day 10 and per CDC guidelines you will be able to end your isolation starting tomorrow  We did review that you may schedule any appointments that you need to but you will still be required to wear a mask and good hand washing  We did discuss however that if you develop any new or worsening symptoms to contact our office directly  We also reviewed that you are overdue for your appointment with the endocrinologist for your diabetes we will get that scheduled and contact you with that appointment  I have spent 13 minutes directly with the patient  Greater than 50% of this time was spent in counseling/coordination of care regarding: prognosis, risks and benefits of treatment options, instructions for management, importance of treatment compliance and risk factor reductions          Encounter provider Mary Ann Villatoro PA-C    Provider located at [address] [place] [address] Road  [address] [place] [id number] [facility] [id number] [phone]    Recent Visits  Date Type Provider Dept   04/30/21 Telemedicine Lele Alvares PA-C  [facility] recent visits within past 7 days and meeting all other requirements     Today's Visits  Date Type Provider Dept   05/04/21 Telemedicine Lele Alvares, [facility] today's visits and meeting all other requirements     Future Appointments  No visits were found meeting these conditions  Showing future appointments within next 150 days and meeting all other requirements      This virtual check-in was done via [employer] and patient was informed that this is a secure, HIPAA-compliant platform  He agrees to proceed  Patient agrees to participate in a virtual check in via telephone or video visit instead of presenting to the office to address urgent/immediate medical needs  Patient is aware this is a billable service  After connecting through [facility], the patient was identified by name and date of birth  Tori Mendoza was informed that this was a telemedicine visit and that the exam was being conducted confidentially over secure lines  My office door was closed  No one else was in the room  Tori Mendoza acknowledged consent and understanding of privacy and security of the telemedicine visit  I informed the patient that I have reviewed his record in Epic and presented the opportunity for him to ask any questions regarding the visit today  The patient agreed to participate  Subjective:   Tori Mendoza is a 48 y o  male who has been screened for COVID-19  Symptom change since last report: resolving  Patient's symptoms include cough  Patient denies fever, chills, fatigue, anosmia, loss of taste, shortness of breath, chest tightness, nausea, vomiting, diarrhea and myalgias  Xmiena Rivera has been staying home and has isolated themselves in his home   He is taking care to not share personal items and is cleaning all surfaces that are touched often, like counters, tabletops, and doorknobs using household cleaning sprays or wipes  He is wearing a mask when he leaves his room  Date of symptom onset: 4/24/2021  Date of exposure: 4/19/2021  Date of positive COVID-19 PCR: 4/28/2021      Patient contacted today for 2nd virtual follow-up for recent COVID infection  As per discussion at last visit grandchildren tested positive 1st   Patient then develop symptoms on April 24th and had a positive COVID test as of April 28    on last virtual follow-up patient was reporting improvement in symptoms other than cough, Tesderek Powers were sent to his pharmacy  States is taking and is helping a little bit  Eating and drinking well    Sleeping well    States other than a little bit of cough No other symptoms      Lab Results   Component Value Date    SARSCOV2 Positive (A) 04/28/2021     Past Medical History:   Diagnosis Date    ADHD, adult residual type     Anxiety     Anxiety     Bipolar 1 disorder (HCC)     Cataract     Left eye    Depression     Depression     Diabetes mellitus (HCC)     blood sugar 167 on admission    Equinus deformity of foot     GERD (gastroesophageal reflux disease)     Homicidal ideations     Hyperlipidemia     Hypertension     Microalbuminuria     Morbid obesity (HCC)     Neuropathy     Shortness of breath     Sleep apnea     CPAP at bedtime    Sleep apnea     Stroke ([address] )     Suicidal intent      Past Surgical History:   Procedure Laterality Date    CATARACT EXTRACTION      CATARACT EXTRACTION      HAND SURGERY Right     OTHER SURGICAL HISTORY      REPAIR OF SUPERFICIAL WOUND FACE    [place] ESOPHAGOGASTRODUODENOSCOPY TRANSORAL DIAGNOSTIC N/A 6/14/2018    Procedure: ESOPHAGOGASTRODUODENOSCOPY (EGD); Surgeon: Massiel Ralph MD;  [facility];   Service: Gastroenterology    [place] ESOPHAGOGASTRODUODENOSCOPY TRANSORAL DIAGNOSTIC N/A 9/12/2018    Procedure: EGD AND COLONOSCOPY;  Surgeon: Tommie Holstein, MD;  [facility];   Service: Gastroenterology     Current Outpatient Medications   Medication Sig Dispense Refill    amLODIPine (NORVASC) 5 mg tablet TAKE 1 TABLET(5 MG) BY MOUTH DAILY 90 tablet 1    ammonium lactate (LAC-HYDRIN) 12 % cream Apply topically as needed for dry skin 385 g 0    ascorbic acid (VITAMIN C) 250 mg tablet Take 1 tablet (250 mg total) by mouth daily 90 tablet 3    atorvastatin (LIPITOR) 10 mg tablet TAKE 1 TABLET(10 MG) BY MOUTH DAILY 90 tablet 1    benzonatate (TESSALON PERLES) 100 mg capsule Take 1 capsule (100 mg total) by mouth 3 (three) times a day as needed for cough 20 capsule 0    benztropine (COGENTIN) 1 mg tablet TAKE 1 TABLET(1 MG TOTAL) BY MOUTH DAILY AS NEEDED FOR TREMORS 90 tablet 0    Blood Glucose Monitoring Suppl (FREESTYLE LITE) ANTONIA by Does not apply route 3 (three) times a day 1 each 0    buPROPion (WELLBUTRIN) 100 mg tablet Take 1 tablet (100 mg total) by mouth daily 90 tablet 0    busPIRone (BUSPAR) 15 mg tablet Take 1 tablet (15 mg total) by mouth 2 (two) times a day 180 tablet 0    clonazePAM (KlonoPIN) 1 mg tablet Take 1 tab po qd prn anxiety or panic attacks 30 tablet 2    docusate sodium (COLACE) 100 mg capsule Take 1 capsule (100 mg total) by mouth daily 30 capsule 3    Dulaglutide (Trulicity) 1 5 UZ/0 5HL SOPN Inject 1 5 mg under the skin 1 time a week 12 pen 1    ergocalciferol (VITAMIN D2) 50,000 units Take 1 capsule (50,000 Units total) by mouth once a week 12 capsule 0    ferrous sulfate (EQL Iron Supplement Therapy) 325 (65 Fe) mg tablet Take 1 tablet (325 mg total) by mouth daily with breakfast 90 tablet 2    gabapentin (NEURONTIN) 300 mg capsule TAKE 1 CAPSULE(300 MG) BY MOUTH TWICE DAILY 180 capsule 1    glucose blood (FREESTYLE LITE) test strip CHECK BLOOD SUGARS FOUR TIMES DAILY 400 each 1    insulin lispro (HumaLOG) 100 units/mL injection pen INJECT 28 UNITS THREE TIMES DAILY WITH MEALS PLUS SCALE(UP  UNITS DAILY) 30 pen 0    Insulin Pen Needle (B-D ULTRAFINE III SHORT PEN) 31G X 8 MM MISC by Does not apply route      INSULIN SYRINGE  5CC/29G 29G X 1/2" 0 5 ML MISC by Does not apply route      Lancets (FREESTYLE) lancets USE THREE TIMES DAILY AS DIRECTED 300 each 0    LANTUS SOLOSTAR 100 units/mL injection pen INJECT 50 UNITS UNDER THE SKIN EVERY 12 HOURS 5 pen 1    lisinopril-hydrochlorothiazide (PRINZIDE,ZESTORETIC) 20-25 MG per tablet TAKE 1 TABLET BY MOUTH DAILY 90 tablet 1    lurasidone (Latuda) 120 mg tablet Take 1 tablet (120 mg total) by mouth daily with dinner 90 tablet 0    omeprazole (PriLOSEC) 20 mg delayed release capsule TAKE 1 CAPSULE(20 MG) BY MOUTH DAILY 90 capsule 1    traZODone (DESYREL) 50 mg tablet Take 1 tablet (50 mg total) by mouth daily at bedtime as needed for sleep 90 tablet 0     No current facility-administered medications for this visit  Allergies   Allergen Reactions    Pollen Extract Nasal Congestion    Tetanus Toxoid Swelling       Review of Systems   Constitutional: Negative for chills, fatigue and fever  Respiratory: Positive for cough  Negative for chest tightness and shortness of breath  Gastrointestinal: Negative for diarrhea, nausea and vomiting  Musculoskeletal: Negative for myalgias  Objective:    Vitals:       Physical Exam  Nursing note reviewed  Constitutional:       General: He is not in acute distress  Appearance: He is not ill-appearing  Eyes:      Conjunctiva/sclera: Conjunctivae normal    Pulmonary:      Effort: Pulmonary effort is normal  No respiratory distress  Comments: On today's virtual visit it is noted patient was not coughing at all during our conversation  Neurological:      Mental Status: He is alert  Mental status is at baseline  Psychiatric:         Mood and Affect: Mood normal        VIRTUAL VISIT DISCLAIMER    Ivette Arteagas acknowledges that he has consented to an online visit or consultation   He understands that the online visit is based solely on information provided by him, and that, in the absence of a face-to-face physical evaluation by the physician, the diagnosis he receives is both limited and provisional in terms of accuracy and completeness  This is not intended to replace a full medical face-to-face evaluation by the physician  Mónica Meyers understands and accepts these terms

## 2021-05-18 ENCOUNTER — OFFICE VISIT (OUTPATIENT)
Dept: MULTI SPECIALTY CLINIC | Facility: CLINIC | Age: 56
End: 2021-05-18

## 2021-05-18 VITALS
HEART RATE: 94 BPM | DIASTOLIC BLOOD PRESSURE: 86 MMHG | WEIGHT: 315 LBS | BODY MASS INDEX: 54.23 KG/M2 | SYSTOLIC BLOOD PRESSURE: 143 MMHG

## 2021-05-18 DIAGNOSIS — Z79.4 TYPE 2 DIABETES MELLITUS WITH HYPERGLYCEMIA, WITH LONG-TERM CURRENT USE OF INSULIN (HCC): Primary | ICD-10-CM

## 2021-05-18 DIAGNOSIS — B35.1 ONYCHOMYCOSIS: ICD-10-CM

## 2021-05-18 DIAGNOSIS — R60.0 BILATERAL LEG EDEMA: ICD-10-CM

## 2021-05-18 DIAGNOSIS — E11.65 TYPE 2 DIABETES MELLITUS WITH HYPERGLYCEMIA, WITH LONG-TERM CURRENT USE OF INSULIN (HCC): Primary | ICD-10-CM

## 2021-05-18 DIAGNOSIS — L85.3 XEROSIS CUTIS: ICD-10-CM

## 2021-05-18 PROCEDURE — 99212 OFFICE O/P EST SF 10 MIN: CPT | Performed by: PODIATRIST

## 2021-05-18 PROCEDURE — 11721 DEBRIDE NAIL 6 OR MORE: CPT | Performed by: PODIATRIST

## 2021-05-18 RX ORDER — AMMONIUM LACTATE 12 G/100G
CREAM TOPICAL AS NEEDED
Qty: 385 G | Refills: 0 | Status: SHIPPED | OUTPATIENT
Start: 2021-05-18 | End: 2021-09-14 | Stop reason: ALTCHOICE

## 2021-05-18 NOTE — PATIENT INSTRUCTIONS
Diabetes and Your Skin   WHAT YOU NEED TO KNOW:   Diabetes can affect every part of your body, including your skin  Diabetes that is not well controlled can damage blood vessels and nerves  Damage to blood vessels can make it hard for blood to flow to tissues and organs  A lack of blood flow to your skin can cause ulcers that are difficult to heal  Skin ulcers are also called sores  People with diabetes can also have more bacterial skin infections than other people  Most skin conditions can be prevented with good blood sugar control  Skin sores can be hard to heal, or become life or limb-threatening, if not treated early  DISCHARGE INSTRUCTIONS:   Call your doctor or care team provider if:   · You get a severe burn or cut  · You have a sore that is painful, warm to the touch, or has redness around it  · Your sore does not get better or seems to get worse  · You have a fever  · Your blood sugar levels continue to be higher than they should be  · You have questions or concerns about your condition or care  Prevent skin sores:   · Keep your blood sugars within target range  Your care team provider will tell you what your blood sugar levels should be  High blood sugar levels increase your risk for skin infections and poor wound healing  · Keep your skin clean  Do not take hot baths or showers  They can cause your skin to get dry  Do not take a bubble bath if you have dry skin  Use moisturizing soaps  · Keep your skin from becoming too dry  Apply moisturizing lotion after baths or showers, especially in cold, dry weather  When you scratch dry, itchy skin, you can cause your skin to be open to infection  Bathe less during cold weather and use lotion to moisturize  Do not put lotion between your toes  Moisture between your toes could lead to skin breakdown  Use a humidifier to keep air in your home from being dry  · Keep areas where skin touches skin dry    Use talcum powder in areas such as armpits and groin  You may also need it under your breasts, and between your toes  Moisture in these areas can cause a fungal infection  · Treat cuts immediately  Clean minor cuts with soap and water  Cover them with sterile gauze  Follow up with your doctor or diabetes care team providers as directed:  Write down your questions so you remember to ask them during your visits  © Copyright [address] Information is for End User's use only and may not be sold, redistributed or otherwise used for commercial purposes  All illustrations and images included in CareNotes® are the copyrighted property of [employer]  or [address] [place] [address]  The above information is an  only  It is not intended as medical advice for individual conditions or treatments  Talk to your doctor, nurse or pharmacist before following any medical regimen to see if it is safe and effective for you

## 2021-05-18 NOTE — PROGRESS NOTES
At [facility] [address] 48 y o  male MRN: [id number]  Encounter: [id number]      Assessment/Plan        Diagnoses and all orders for this visit:    Type 2 diabetes mellitus with hyperglycemia, with long-term current use of insulin (HCC)    Onychomycosis    Xerosis cutis  -     ammonium lactate (LAC-HYDRIN) 12 % cream; Apply topically as needed for dry skin    Bilateral leg edema       Plan:   Patient was seen/examined  All questions and concerns addressed   Nails x10 were sharply trimmed to normal length and thickness with a large nail nipper without incident   Patient's prescription for ammonium lactate was renewed for treatment of xerosis cutis   Patient was educated on venous insufficiency, stressed the importance elevation and compression to reduce edema in bilateral legs   Stressed the importance of glycemic control, shoe gear, and proper diabetic foot care   Giannaangelo risk group 0   RTC in 3 month(s)    Dr Lucas Workman was present during this entire procedure  History of Present Illness     HPI:  Savanna Marrero is a 48 y o  male who presents with elongated toenails, dry pedal skin, bilateral leg swelling and type 2 diabetes  States that their nails are painful, elongated  They have difficulty applying their socks and shoes  The pressure within their shoe gear is painful and they have been unable to cut their nails adequately  The patient states that his foot pain that was present at his last appointment has now completely resolved  He denies attempting any of the conservative treatment options discussed at his last appointment and states that he did not have time to get his x-ray  Patient states that he uses his ammonium lactate cream as prescribed and states that he is nearly out, the patient requests his prescription to be renewed  Patient admits to numbness/tingling, which is specially bad at night  They state their last blood glucose level was 150mg/dL   The patient denies any nausea, vomiting, fever, chills, shortness of breath, or chest pains  Review of Systems   Constitutional: Negative  HENT: Negative  Eyes: Negative  Respiratory: Negative  Cardiovascular: Negative  Gastrointestinal: Negative  Musculoskeletal: Negative   Skin: elongated thickened toenails, xerosis cutis  Neurological:  Peripheral neuropathy       Historical Information   Past Medical History:   Diagnosis Date    ADHD, adult residual type     Anxiety     Anxiety     Bipolar 1 disorder (HCC)     Cataract     Left eye    Depression     Depression     Diabetes mellitus (HCC)     blood sugar 167 on admission    Equinus deformity of foot     GERD (gastroesophageal reflux disease)     Homicidal ideations     Hyperlipidemia     Hypertension     Microalbuminuria     Morbid obesity (HCC)     Neuropathy     Shortness of breath     Sleep apnea     CPAP at bedtime    Sleep apnea     Stroke ([facility] [address] )     Suicidal intent      Past Surgical History:   Procedure Laterality Date    CATARACT EXTRACTION      CATARACT EXTRACTION      HAND SURGERY Right     OTHER SURGICAL HISTORY      REPAIR OF SUPERFICIAL WOUND FACE    [place] ESOPHAGOGASTRODUODENOSCOPY TRANSORAL DIAGNOSTIC N/A 6/14/2018    Procedure: ESOPHAGOGASTRODUODENOSCOPY (EGD); Surgeon: Salomón Bartholomew MD;  Location: [facility]; Service: Gastroenterology    ND ESOPHAGOGASTRODUODENOSCOPY TRANSORAL DIAGNOSTIC N/A 9/12/2018    Procedure: EGD AND COLONOSCOPY;  Surgeon: Salomón Bartholomew MD;  Location: [facility];   Service: Gastroenterology     Social History   Social History     Substance and Sexual Activity   Alcohol Use Yes    Frequency: Monthly or less    Drinks per session: 1 or 2    Binge frequency: Never    Comment: rarely     Social History     Substance and Sexual Activity   Drug Use Not Currently    Comment: quit 20 years ago     Social History     Tobacco Use   Smoking Status Former Smoker    Types: Cigarettes    Quit date: 5    Years since quittin 4   Smokeless Tobacco Former User    Types: Chew   Tobacco Comment    pt "Quit after approx over 25 years ago"     Family History:   Family History   Problem Relation Age of Onset    Diabetes Mother     Alcohol abuse Father     Diabetes Father     Hypertension Father     Lung cancer Father     Stroke Father     Cancer Father         lung    Alcohol abuse Sister     Depression Sister     Lymphoma Sister     Alcohol abuse Family     Alcohol abuse Sister     Alcohol abuse Sister     Cancer Sister     Heart disease Neg Hx     Thyroid disease Neg Hx        Meds/Allergies   (Not in a hospital admission)    Allergies   Allergen Reactions    Pollen Extract Nasal Congestion    Tetanus Toxoid Swelling       Objective     Current Vitals:   Blood Pressure: 143/86 (21 0934)  Pulse: 94 (21 0934)  Weight - Scale: (!) 152 kg (336 lb) (21)        /86 (BP Location: Right arm, Patient Position: Sitting, Cuff Size: Large)   Pulse 94   Wt (!) 152 kg (336 lb)   BMI 54 23 kg/m²       Lower Extremity Exam:    Diabetic Foot Exam    Musculoskeletal:  MMT is 5/5 to all compartments of the LE, +2/4 edema B/L, Hallux limitus is present  Equinus is present  No pain on palpation to any area of the bilateral lower extremities  No pain on palpation noted to the left ankle or along the course of the left peroneal tendons  Vascular:   R DP is +2/4, R PT is +1/4, L DP is +2/4, L PT is +1/4, CFT< 3sec to all digits  Pedal hair is Absent  regular rate and rhythm  Bilateral lower extremity pitting edema noted  Skin:  No open Lesions  Skin of the LE is normal texture, temperature, turgor  Interdigital maceration is not present  Nails elongated and thickened with subungual debris x10  Xerotic skin is noted of the plantar surface of the feet bilaterally  Neurologic:  Gross sensation is intact  Protective sensation is Intact    Proprioceptive sensation is intact  Sharp sensation is present

## 2021-06-07 DIAGNOSIS — E11.65 TYPE 2 DIABETES MELLITUS WITH HYPERGLYCEMIA, WITH LONG-TERM CURRENT USE OF INSULIN (HCC): ICD-10-CM

## 2021-06-07 DIAGNOSIS — Z79.4 TYPE 2 DIABETES MELLITUS WITH HYPERGLYCEMIA, WITH LONG-TERM CURRENT USE OF INSULIN (HCC): ICD-10-CM

## 2021-06-08 RX ORDER — BLOOD-GLUCOSE METER
KIT MISCELLANEOUS
Qty: 400 STRIP | Refills: 3 | Status: SHIPPED | OUTPATIENT
Start: 2021-06-08

## 2021-06-12 DIAGNOSIS — I10 BENIGN ESSENTIAL HYPERTENSION: ICD-10-CM

## 2021-06-12 DIAGNOSIS — K22.2 LOWER ESOPHAGEAL RING (SCHATZKI): ICD-10-CM

## 2021-06-12 DIAGNOSIS — K21.00 GASTROESOPHAGEAL REFLUX DISEASE WITH ESOPHAGITIS: ICD-10-CM

## 2021-06-12 DIAGNOSIS — E11.42 DIABETIC POLYNEUROPATHY ASSOCIATED WITH TYPE 2 DIABETES MELLITUS (HCC): ICD-10-CM

## 2021-06-12 DIAGNOSIS — E11.65 TYPE 2 DIABETES MELLITUS WITH HYPERGLYCEMIA, UNSPECIFIED WHETHER LONG TERM INSULIN USE (HCC): ICD-10-CM

## 2021-06-12 DIAGNOSIS — E11.65 TYPE 2 DIABETES MELLITUS WITH HYPERGLYCEMIA, WITH LONG-TERM CURRENT USE OF INSULIN (HCC): ICD-10-CM

## 2021-06-12 DIAGNOSIS — Z79.4 TYPE 2 DIABETES MELLITUS WITH HYPERGLYCEMIA, WITH LONG-TERM CURRENT USE OF INSULIN (HCC): ICD-10-CM

## 2021-06-12 DIAGNOSIS — K44.9 HIATAL HERNIA: ICD-10-CM

## 2021-06-13 DIAGNOSIS — F31.61 BIPOLAR DISORDER, CURRENT EPISODE MIXED, MILD (HCC): ICD-10-CM

## 2021-06-14 RX ORDER — BUPROPION HYDROCHLORIDE 100 MG/1
TABLET ORAL
Qty: 90 TABLET | Refills: 0 | OUTPATIENT
Start: 2021-06-14

## 2021-06-14 RX ORDER — AMLODIPINE BESYLATE 5 MG/1
TABLET ORAL
Qty: 90 TABLET | Refills: 1 | Status: SHIPPED | OUTPATIENT
Start: 2021-06-14 | End: 2021-11-24 | Stop reason: SDUPTHER

## 2021-06-14 RX ORDER — LISINOPRIL AND HYDROCHLOROTHIAZIDE 25; 20 MG/1; MG/1
TABLET ORAL
Qty: 90 TABLET | Refills: 1 | Status: SHIPPED | OUTPATIENT
Start: 2021-06-14 | End: 2021-11-24 | Stop reason: SDUPTHER

## 2021-06-14 RX ORDER — GABAPENTIN 300 MG/1
CAPSULE ORAL
Qty: 180 CAPSULE | Refills: 1 | Status: SHIPPED | OUTPATIENT
Start: 2021-06-14 | End: 2021-11-24 | Stop reason: SDUPTHER

## 2021-06-14 RX ORDER — OMEPRAZOLE 20 MG/1
CAPSULE, DELAYED RELEASE ORAL
Qty: 90 CAPSULE | Refills: 1 | Status: SHIPPED | OUTPATIENT
Start: 2021-06-14 | End: 2021-11-24 | Stop reason: SDUPTHER

## 2021-06-14 RX ORDER — ATORVASTATIN CALCIUM 10 MG/1
TABLET, FILM COATED ORAL
Qty: 90 TABLET | Refills: 1 | Status: SHIPPED | OUTPATIENT
Start: 2021-06-14 | End: 2021-11-24 | Stop reason: SDUPTHER

## 2021-06-14 RX ORDER — DULAGLUTIDE 1.5 MG/.5ML
INJECTION, SOLUTION SUBCUTANEOUS
Qty: 6 ML | Refills: 0 | Status: SHIPPED | OUTPATIENT
Start: 2021-06-14 | End: 2021-09-02
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
## 2021-07-01 ENCOUNTER — OFFICE VISIT (OUTPATIENT)
Dept: PSYCHIATRY | Facility: CLINIC | Age: 56
End: 2021-07-01
Payer: MEDICARE

## 2021-07-01 DIAGNOSIS — F41.1 GENERALIZED ANXIETY DISORDER: Primary | Chronic | ICD-10-CM

## 2021-07-01 DIAGNOSIS — R29.818 EXTRAPYRAMIDAL SYMPTOM: ICD-10-CM

## 2021-07-01 DIAGNOSIS — F41.0 PANIC ATTACKS: ICD-10-CM

## 2021-07-01 DIAGNOSIS — G47.00 INSOMNIA, UNSPECIFIED TYPE: ICD-10-CM

## 2021-07-01 DIAGNOSIS — F31.61 BIPOLAR DISORDER, CURRENT EPISODE MIXED, MILD (HCC): ICD-10-CM

## 2021-07-01 PROCEDURE — 99213 OFFICE O/P EST LOW 20 MIN: CPT | Performed by: PHYSICIAN ASSISTANT

## 2021-07-01 RX ORDER — TRAZODONE HYDROCHLORIDE 50 MG/1
50 TABLET ORAL
Qty: 90 TABLET | Refills: 0 | Status: SHIPPED | OUTPATIENT
Start: 2021-07-01 | End: 2021-09-23 | Stop reason: SDUPTHER

## 2021-07-01 RX ORDER — BUSPIRONE HYDROCHLORIDE 15 MG/1
15 TABLET ORAL 2 TIMES DAILY
Qty: 180 TABLET | Refills: 0 | Status: SHIPPED | OUTPATIENT
Start: 2021-07-01 | End: 2021-09-23 | Stop reason: SDUPTHER

## 2021-07-01 RX ORDER — CLONAZEPAM 1 MG/1
TABLET ORAL
Qty: 30 TABLET | Refills: 2 | Status: SHIPPED | OUTPATIENT
Start: 2021-07-01 | End: 2021-09-23 | Stop reason: SDUPTHER

## 2021-07-01 RX ORDER — BENZTROPINE MESYLATE 1 MG/1
1 TABLET ORAL DAILY PRN
Qty: 90 TABLET | Refills: 0 | Status: SHIPPED | OUTPATIENT
Start: 2021-07-01 | End: 2021-09-23 | Stop reason: SDUPTHER

## 2021-07-01 RX ORDER — BUPROPION HYDROCHLORIDE 100 MG/1
100 TABLET ORAL DAILY
Qty: 90 TABLET | Refills: 0 | Status: SHIPPED | OUTPATIENT
Start: 2021-07-01 | End: 2021-09-23 | Stop reason: SDUPTHER

## 2021-07-08 ENCOUNTER — TELEPHONE (OUTPATIENT)
Dept: PSYCHIATRY | Facility: CLINIC | Age: 56
End: 2021-07-08

## 2021-07-08 NOTE — TELEPHONE ENCOUNTER
----- Message from Janessa Sheffield sent at 7/8/2021  3:36 PM EDT -----  Regarding: Patient No Show  [facility] [[id number]] No Showed 07/08/21  for Marlo Olivera, NICK, ELKIN, FAY and did not call with proper notice to Cx appt   They are marked as a No Show for today's visit

## 2021-08-23 ENCOUNTER — OFFICE VISIT (OUTPATIENT)
Dept: MULTI SPECIALTY CLINIC | Facility: CLINIC | Age: 56
End: 2021-08-23

## 2021-08-23 VITALS
BODY MASS INDEX: 50.62 KG/M2 | TEMPERATURE: 97.5 F | HEART RATE: 91 BPM | HEIGHT: 66 IN | DIASTOLIC BLOOD PRESSURE: 76 MMHG | WEIGHT: 315 LBS | SYSTOLIC BLOOD PRESSURE: 112 MMHG

## 2021-08-23 DIAGNOSIS — E11.65 TYPE 2 DIABETES MELLITUS WITH HYPERGLYCEMIA, WITH LONG-TERM CURRENT USE OF INSULIN (HCC): Primary | ICD-10-CM

## 2021-08-23 DIAGNOSIS — Z79.4 TYPE 2 DIABETES MELLITUS WITH HYPERGLYCEMIA, WITH LONG-TERM CURRENT USE OF INSULIN (HCC): Primary | ICD-10-CM

## 2021-08-23 DIAGNOSIS — B35.1 ONYCHOMYCOSIS: ICD-10-CM

## 2021-08-23 DIAGNOSIS — R60.0 BILATERAL LEG EDEMA: ICD-10-CM

## 2021-08-23 PROCEDURE — 99213 OFFICE O/P EST LOW 20 MIN: CPT | Performed by: PODIATRIST

## 2021-08-23 PROCEDURE — 11721 DEBRIDE NAIL 6 OR MORE: CPT | Performed by: PODIATRIST

## 2021-08-23 NOTE — PROGRESS NOTES
[address] [facility] Claudia 55 y o  male MRN: [id number]  Encounter: [id number]      Assessment/Plan        Diagnoses and all orders for this visit:    Type 2 diabetes mellitus with hyperglycemia, with long-term current use of insulin ([address] )    Onychomycosis    Bilateral leg edema           Plan:   Patient was seen/examined  All questions and concerns addressed   Sharp debridement of nails x10 in thickness and length   Compression stockings  Encouraged purchase from a [employer], [employer], or [address] [facility] [address]   Left Ankle pain spontaneous recovery  Never went for xray's   Encouraged daily foot checks and educated on the importance of edema control   RTC in 3 month(s)    Dr Bala Virk was present during this entire procedure  Procedure:  Nails debrided with nippers in thickness and length with removal of subungual debris  With patient's consent without incident  History of Present Illness     HPI:  Patient states that he is doing well and that he doesn't have any prodiatric complaints other than long nails  States that he didn't go get x-rays because he feels better now  And Didn't get compression stockings because he didn't know where to buy them  Denies fever chills nausea or vomiting  Review of Systems   Constitutional: Negative  HENT: Negative  Eyes: Negative  Respiratory: Negative  Cardiovascular: Negative  Gastrointestinal: Negative  Musculoskeletal: negative  Skin: Edema bilateral, onychomycosis  Neurological: Negative         Historical Information   Past Medical History:   Diagnosis Date    ADHD, adult residual type     Anxiety     Anxiety     Bipolar 1 disorder (HCC)     Cataract     Left eye    Depression     Depression     Diabetes mellitus (HCC)     blood sugar 167 on admission    Equinus deformity of foot     GERD (gastroesophageal reflux disease)     Homicidal ideations     Hyperlipidemia     Hypertension     Microalbuminuria     Morbid obesity (HCC)     Neuropathy  Shortness of breath     Sleep apnea     CPAP at bedtime    Sleep apnea     Stroke ([address] )     Suicidal intent      Past Surgical History:   Procedure Laterality Date    CATARACT EXTRACTION      CATARACT EXTRACTION      HAND SURGERY Right     OTHER SURGICAL HISTORY      REPAIR OF SUPERFICIAL WOUND FACE    NE ESOPHAGOGASTRODUODENOSCOPY TRANSORAL DIAGNOSTIC N/A 2018    Procedure: ESOPHAGOGASTRODUODENOSCOPY (EGD); Surgeon: Adolfo Senior MD;  Location: [facility]; Service: Gastroenterology    NE ESOPHAGOGASTRODUODENOSCOPY TRANSORAL DIAGNOSTIC N/A 2018    Procedure: EGD AND COLONOSCOPY;  Surgeon: Adolfo Senior MD;  Location: [facility];   Service: Gastroenterology     Social History   Social History     Substance and Sexual Activity   Alcohol Use Yes    Comment: rarely     Social History     Substance and Sexual Activity   Drug Use Not Currently    Comment: quit 20 years ago     Social History     Tobacco Use   Smoking Status Former Smoker    Types: Cigarettes    Quit date: 5    Years since quittin 6   Smokeless Tobacco Former User    Types: Chew   Tobacco Comment    pt "Quit after approx over 25 years ago"     Family History:   Family History   Problem Relation Age of Onset    Diabetes Mother     Alcohol abuse Father    [facility] Diabetes Father     Hypertension Father     Lung cancer Father     Stroke Father     Cancer Father         lung    Alcohol abuse Sister     Depression Sister     Lymphoma Sister     Alcohol abuse Family     Alcohol abuse Sister     Alcohol abuse Sister     Cancer Sister     Heart disease Neg Hx     Thyroid disease Neg Hx        Meds/Allergies   (Not in a hospital admission)    Allergies   Allergen Reactions    Pollen Extract Nasal Congestion    Tetanus Toxoid Swelling       Objective     Current Vitals:   Blood Pressure: 112/76 (21 1327)  Pulse: 91 (21 1327)  Temperature: 97 5 °F (36 4 °C) (21 1327)  Temp Source: Temporal (21 1327)  Height: 5' 6" (167 6 cm) (08/23/21 1327)  Weight - Scale: (!) 153 kg (338 lb) (08/23/21 1327)        /76 (BP Location: Left arm, Patient Position: Sitting, Cuff Size: Large)   Pulse 91   Temp 97 5 °F (36 4 °C) (Temporal)   Ht 5' 6" (1 676 m)   Wt (!) 153 kg (338 lb)   BMI 54 55 kg/m²       Lower Extremity Exam:    Vascular: Right foot DP/PT +2                   Left foot DP/PT +2                   There is +3 lower extremity edema bilateral  From the ankle proximal     Musculoskeletal: There is 5/5 strength throughout the bilateral lower extremity  limited ankle range of motion with well maintained subtalar range of motion  There is no tenderness over the foot and ankle  There is foot deformities: pes planus    Neurological: Sensation to 5 07 [employer]-Crist[employer] nylon filament: negative bilaterally          Dermatology: Skin Condition:  decreased hair growth and mobility and turgor normal     There is not evidence of macerated tissue between toe spaces  Nail Exam: onychomycosis of the toenails       Open ulcerations: No     Calluses: No

## 2021-08-30 ENCOUNTER — TELEPHONE (OUTPATIENT)
Dept: PSYCHIATRY | Facility: CLINIC | Age: 56
End: 2021-08-30

## 2021-08-30 ENCOUNTER — SOCIAL WORK (OUTPATIENT)
Dept: BEHAVIORAL/MENTAL HEALTH CLINIC | Facility: CLINIC | Age: 56
End: 2021-08-30
Payer: MEDICARE

## 2021-08-30 DIAGNOSIS — F31.61 BIPOLAR DISORDER, CURRENT EPISODE MIXED, MILD (HCC): Primary | ICD-10-CM

## 2021-08-30 DIAGNOSIS — F41.1 GENERALIZED ANXIETY DISORDER: ICD-10-CM

## 2021-08-30 DIAGNOSIS — G47.00 INSOMNIA, UNSPECIFIED TYPE: ICD-10-CM

## 2021-08-30 DIAGNOSIS — F41.0 PANIC ATTACKS: ICD-10-CM

## 2021-08-30 PROCEDURE — 90834 PSYTX W PT 45 MINUTES: CPT | Performed by: SOCIAL WORKER

## 2021-08-30 NOTE — PSYCH
Psychotherapy Provided: Individual Psychotherapy 45 minutes     Length of time in session: 45 minutes, follow up in 2 week    Depression, anxiety    Goals addressed in session: Goal 1, Goal 2 and Goal 3      Pain:      moderate to severe    0    Current suicide risk : Low     Data: Nicolas Clark arrived for her session  He shared how his 32year old stepdaughter basically just dumped her 10year old and her 3 year on Nicolas Clark and her mother Orlando's partner  Nicolas Clark said  he is ok with it  His stepdaughter is 32 and she is with a 21year old  Nicolas Clark reports less anxiety, less depression, less panic  He feels his overall level of anxiety is better  He reports his mood is stable  Assessment: Nicolas Clark denies feelings of self harm or harm to others  He admits physically he is not taking care of himself  We continue to work on mindfulness, CBT and on solution focused strategies  Plan: Continue to skill build in distress tolerance  Behavioral Health Treatment Plan [facility]: Diagnosis and Treatment Plan explained to Gato Pac relates understanding diagnosis and is agreeable to Treatment Plan   Yes

## 2021-09-02 DIAGNOSIS — Z79.4 TYPE 2 DIABETES MELLITUS WITH HYPERGLYCEMIA, WITH LONG-TERM CURRENT USE OF INSULIN (HCC): ICD-10-CM

## 2021-09-02 DIAGNOSIS — E11.65 TYPE 2 DIABETES MELLITUS WITH HYPERGLYCEMIA, WITH LONG-TERM CURRENT USE OF INSULIN (HCC): ICD-10-CM

## 2021-09-02 RX ORDER — DULAGLUTIDE 1.5 MG/.5ML
INJECTION, SOLUTION SUBCUTANEOUS
Qty: 6 ML | Refills: 0 | Status: SHIPPED | OUTPATIENT
Start: 2021-09-02 | End: 2021-11-24 | Stop reason: SDUPTHER

## 2021-09-02 NOTE — BH TREATMENT PLAN
Mima Parvin  1965       Date of Initial Treatment Plan: 05/07/18   Date of Current Treatment Plan: 08/30/2022    Treatment Plan Number update     Kind, caring,passion for life    Diagnosis:   1  Bipolar disorder, current episode mixed, mild ([address] )     2  Panic attacks     3  Insomnia, unspecified type     4  Generalized anxiety disorder         Area of Needs: please see below      Long Term Goal 1: I still need to better manage my depression    Target Date: 02/20/2022  Completion Date: TBD         Short Term Objectives for Goal 1: Mindfulness, CBT    Long Term Goal 2: I eed to better manage y anxiety    Target Date: 02/20/2022  Completion Date: TBD    Short Term Objectives for Goal 2: Mindfulness, meditation         Long Term Goal # 3: N/A     Target Date: N/A  Completion Date: N/A    Short Term Objectives for Goal 3: N/A    GOAL 1: Modality: Individual 2x per month   Completion Date tbd    GOAL 2: Modality: Individual 2x per month   Completion Date tbd     GOAL 3: Modality: Individual n/ax per month   Completion Date n/a      Behavioral Health Treatment Plan  Luke: Diagnosis and Treatment Plan explained to Guy Murphy relates understanding diagnosis and is agreeable to Treatment Plan         Client Comments : Please share your thoughts, feelings, need and/or experiences regarding your treatment plan: Judi Devi and the undersigned will work as a team to help him progress on his goals

## 2021-09-09 ENCOUNTER — SOCIAL WORK (OUTPATIENT)
Dept: BEHAVIORAL/MENTAL HEALTH CLINIC | Facility: CLINIC | Age: 56
End: 2021-09-09
Payer: MEDICARE

## 2021-09-09 DIAGNOSIS — F41.1 GENERALIZED ANXIETY DISORDER: ICD-10-CM

## 2021-09-09 DIAGNOSIS — G47.00 INSOMNIA, UNSPECIFIED TYPE: ICD-10-CM

## 2021-09-09 DIAGNOSIS — F31.61 BIPOLAR DISORDER, CURRENT EPISODE MIXED, MILD (HCC): Primary | ICD-10-CM

## 2021-09-09 DIAGNOSIS — F41.0 PANIC ATTACKS: ICD-10-CM

## 2021-09-09 DIAGNOSIS — Z63.4 BEREAVEMENT: ICD-10-CM

## 2021-09-09 PROCEDURE — 90834 PSYTX W PT 45 MINUTES: CPT | Performed by: SOCIAL WORKER

## 2021-09-09 SDOH — SOCIAL STABILITY - SOCIAL INSECURITY: DISSAPEARANCE AND DEATH OF FAMILY MEMBER: Z63.4

## 2021-09-09 NOTE — PSYCH
Psychotherapy Provided: Individual Psychotherapy 45 minutes     Length of time in session: 45 minutes, follow up in 2 week    Depression, anxiety    Goals addressed in session: Goal 1 and Goal 2     Pain:      moderate to severe    0    Current suicide risk : Low     Data: Judi Devi arrived for his session  He is less depressed and anxious  He is raising his great niece and now his partner's two grandkids  He purchased a new vehicle  Assessment: We discussed the stress of raising children  He admits he is not eating healthy or exercising  He discussed the stress of living with his partner's father  He is not suicidal or homicidal and he feels his depression and anxiety are under control  We worked on mindfulness, CBT and solution focused therapy  Plan: Continue to skill build in distress tolerance  Behavioral Health Treatment Plan [facility]: Diagnosis and Treatment Plan explained to Guy Murphy relates understanding diagnosis and is agreeable to Treatment Plan   Yes

## 2021-09-14 ENCOUNTER — OFFICE VISIT (OUTPATIENT)
Dept: INTERNAL MEDICINE CLINIC | Facility: CLINIC | Age: 56
End: 2021-09-14

## 2021-09-14 VITALS
SYSTOLIC BLOOD PRESSURE: 105 MMHG | OXYGEN SATURATION: 97 % | DIASTOLIC BLOOD PRESSURE: 71 MMHG | HEART RATE: 90 BPM | WEIGHT: 315 LBS | HEIGHT: 66 IN | BODY MASS INDEX: 50.62 KG/M2 | TEMPERATURE: 97.3 F

## 2021-09-14 DIAGNOSIS — E78.2 MIXED HYPERLIPIDEMIA: ICD-10-CM

## 2021-09-14 DIAGNOSIS — E11.42 DIABETIC POLYNEUROPATHY ASSOCIATED WITH TYPE 2 DIABETES MELLITUS (HCC): ICD-10-CM

## 2021-09-14 DIAGNOSIS — E55.9 VITAMIN D DEFICIENCY: ICD-10-CM

## 2021-09-14 DIAGNOSIS — F31.62 BIPOLAR DISORDER, CURRENT EPISODE MIXED, MODERATE (HCC): ICD-10-CM

## 2021-09-14 DIAGNOSIS — N18.30 BENIGN HYPERTENSION WITH CKD (CHRONIC KIDNEY DISEASE) STAGE III (HCC): Primary | ICD-10-CM

## 2021-09-14 DIAGNOSIS — G63 POLYNEUROPATHY ASSOCIATED WITH UNDERLYING DISEASE (HCC): ICD-10-CM

## 2021-09-14 DIAGNOSIS — E11.65 TYPE 2 DIABETES MELLITUS WITH HYPERGLYCEMIA, WITH LONG-TERM CURRENT USE OF INSULIN (HCC): ICD-10-CM

## 2021-09-14 DIAGNOSIS — N18.31 STAGE 3A CHRONIC KIDNEY DISEASE (HCC): ICD-10-CM

## 2021-09-14 DIAGNOSIS — Z11.4 SCREENING FOR HIV (HUMAN IMMUNODEFICIENCY VIRUS): ICD-10-CM

## 2021-09-14 DIAGNOSIS — Z11.59 ENCOUNTER FOR HEPATITIS C SCREENING TEST FOR LOW RISK PATIENT: ICD-10-CM

## 2021-09-14 DIAGNOSIS — I12.9 BENIGN HYPERTENSION WITH CKD (CHRONIC KIDNEY DISEASE) STAGE III (HCC): Primary | ICD-10-CM

## 2021-09-14 DIAGNOSIS — E66.01 MORBID OBESITY WITH BMI OF 50.0-59.9, ADULT (HCC): ICD-10-CM

## 2021-09-14 DIAGNOSIS — N18.31 ANEMIA DUE TO STAGE 3A CHRONIC KIDNEY DISEASE (HCC): ICD-10-CM

## 2021-09-14 DIAGNOSIS — Z79.4 TYPE 2 DIABETES MELLITUS WITH HYPERGLYCEMIA, WITH LONG-TERM CURRENT USE OF INSULIN (HCC): ICD-10-CM

## 2021-09-14 DIAGNOSIS — D63.1 ANEMIA DUE TO STAGE 3A CHRONIC KIDNEY DISEASE (HCC): ICD-10-CM

## 2021-09-14 PROCEDURE — 99213 OFFICE O/P EST LOW 20 MIN: CPT | Performed by: PHYSICIAN ASSISTANT

## 2021-09-14 RX ORDER — FERROUS SULFATE 325(65) MG
325 TABLET ORAL
Qty: 90 TABLET | Refills: 2 | Status: SHIPPED | OUTPATIENT
Start: 2021-09-14

## 2021-09-14 RX ORDER — MULTIVIT WITH MINERALS/LUTEIN
250 TABLET ORAL DAILY
Qty: 90 TABLET | Refills: 3 | Status: SHIPPED | OUTPATIENT
Start: 2021-09-14

## 2021-09-14 NOTE — PROGRESS NOTES
Assessment/Plan: On your visit today we reviewed that you did not require any labs prior to today's visit  Blood pressure remains well controlled no adjustment to your medications  As noted previously you are on atorvastatin for cardiovascular risk protection but that you have not had elevated cholesterol levels in the past       You are currently following with endocrinology for your type 2 diabetes  You are scheduled for follow-up in November, script provided today to get your A1c completed when you go for the labs for the nephrologist 1st week in October  You are aware and do confirm that you are avoiding all nephrotoxic medications such as ibuprofen, Motrin, Advil and Aleve  You may take Tylenol if necessary for pain and you will continue your gabapentin for your diabetic neuropathy  We did discuss however that with decreased kidney function, blood pressure and cholesterol levels are good but you really need to continue to work on your weight, diet and taking diabetic medications as prescribed  You are aware that the longer your diabetes remains un controlled the more potential damage and wrist to your organs including her kidneys  Once you get your labs completed for the nephrologist their office will call you to schedule your follow-up appointment  Continue follow-up and medications with your mental health providers as scheduled  You have completed your COVID vaccine series  Appointment here after your labs for me as dated in 6 months  Please remember to get your flu vaccine this fall colonoscopy 2028    No problem-specific Assessment & Plan notes found for this encounter  Diagnoses and all orders for this visit:    Benign hypertension with CKD (chronic kidney disease) stage III (HCC)  -     ascorbic acid (VITAMIN C) 250 mg tablet; Take 1 tablet (250 mg total) by mouth daily  -     ferrous sulfate (EQL Iron Supplement Therapy) 325 (65 Fe) mg tablet;  Take 1 tablet (325 mg total) by mouth daily with breakfast    Mixed hyperlipidemia  -     ascorbic acid (VITAMIN C) 250 mg tablet; Take 1 tablet (250 mg total) by mouth daily  -     ferrous sulfate (EQL Iron Supplement Therapy) 325 (65 Fe) mg tablet; Take 1 tablet (325 mg total) by mouth daily with breakfast    Type 2 diabetes mellitus with hyperglycemia, with long-term current use of insulin ([facility])  -     Comprehensive metabolic panel; Future  -     Lipid Panel with Direct LDL reflex; Future  -     Hemoglobin A1C    Morbid obesity with BMI of 50 0-59 9, adult ([facility])    Bipolar disorder, current episode mixed, moderate ([facility])    Polyneuropathy associated with underlying disease ([facility])    Stage 3a chronic kidney disease ([facility])  -     ascorbic acid (VITAMIN C) 250 mg tablet; Take 1 tablet (250 mg total) by mouth daily  -     ferrous sulfate (EQL Iron Supplement Therapy) 325 (65 Fe) mg tablet; Take 1 tablet (325 mg total) by mouth daily with breakfast    Anemia due to stage 3a chronic kidney disease ([facility])  -     ascorbic acid (VITAMIN C) 250 mg tablet; Take 1 tablet (250 mg total) by mouth daily  -     ferrous sulfate (EQL Iron Supplement Therapy) 325 (65 Fe) mg tablet; Take 1 tablet (325 mg total) by mouth daily with breakfast    Vitamin D deficiency  -     ascorbic acid (VITAMIN C) 250 mg tablet; Take 1 tablet (250 mg total) by mouth daily  -     ferrous sulfate (EQL Iron Supplement Therapy) 325 (65 Fe) mg tablet; Take 1 tablet (325 mg total) by mouth daily with breakfast    Diabetic polyneuropathy associated with type 2 diabetes mellitus ([facility] )    Encounter for hepatitis C screening test for low risk patient  -     Hepatitis C antibody; Future    Screening for HIV (human immunodeficiency virus)  -     HIV 1/2 Antigen/Antibody (4th Generation) w Reflex [facility]; Future          Subjective:      Patient ID: Sly Jaffe is a 55 y o  male  Patient presents today for routine follow-up of chronic medical conditions      Patient followed for type 2 diabetes and patient does follow with endocrinology  As noted patient is scheduled in November  Diabetes has not been well controlled and is working with endocrinology to adjust his insulin  Blood pressure remains well controlled  no need to adjust dose / medications  Patient is on atorvastatin for cholesterol and cardiovascular protection and has been well controlled will need labs next year  Patient is also following with nephrology for CKD stage 3  Has labs ordered that are due in the beginning of October prior to his follow-up appointment  Confirms following recommendations, NO NSAID      Patient continues to follow with mental health services for counseling and psychiatric for anxiety and bipolar disorder  Patient has completed his COVID vaccine series  Trulioity Friday  Lantus 50 twice a day    Humalog 28 twice a day states does not eat lunch  As not get up until 9-10 am eats clyde      Patient's last A1c in March was 8 6%  As noted he does have follow-up with the endocrinologist in November  Did review with patient once again that his blood pressure is very good he is not taking any nephrotoxic medications such as NSAIDs and now really needs to work on his diabetes to help prevent progression of chronic kidney disease  Patient initially was going to have an A1c completed in the office but changed his mind in states he wanted to get it done when he goes for his labs for the nephrologist in 2 weeks  A1c order provided today  Script provided for remainder of his labs that are due in 6 months which is his cholesterol levels  Patient states he went to [facility] for sight for his eye exam a few weeks ago will wait on his report  Patient is agreeable to hep C and HIV screening will be ordered with his follow-up labs                    The following portions of the patient's history were reviewed and updated as appropriate: allergies, current medications, past family history, past medical history, past social history, past surgical history and problem list     Review of Systems   Constitutional: Negative  Negative for diaphoresis, fever and unexpected weight change  Eyes: Negative for visual disturbance  Respiratory: Negative  Negative for cough, chest tightness and shortness of breath  Cardiovascular: Negative  Negative for chest pain and palpitations  Gastrointestinal: Negative  Negative for abdominal pain  Endocrine: Positive for polyphagia  Negative for polydipsia and polyuria  Musculoskeletal: Positive for arthralgias (known OA knees unchanged)  Skin: Negative for rash  Neurological: Positive for numbness (DM neuropathy)  Negative for dizziness, light-headedness and headaches  Psychiatric/Behavioral: The patient is nervous/anxious  Objective:      /71 (BP Location: Left arm, Patient Position: Sitting, Cuff Size: Large)   Pulse 90   Temp (!) 97 3 °F (36 3 °C) (Temporal)   Ht 5' 6" (1 676 m)   Wt (!) 156 kg (345 lb)   SpO2 97%   BMI 55 68 kg/m²          Physical Exam  Vitals and nursing note reviewed  Constitutional:       General: He is not in acute distress  Appearance: He is obese  HENT:      Head: Normocephalic  Eyes:      Conjunctiva/sclera: Conjunctivae normal    Neck:      Vascular: No carotid bruit  Cardiovascular:      Rate and Rhythm: Normal rate and regular rhythm  Heart sounds: Normal heart sounds  Pulmonary:      Effort: Pulmonary effort is normal       Breath sounds: Normal breath sounds  Abdominal:      General: Bowel sounds are normal    Musculoskeletal:      Cervical back: Neck supple  Right lower leg: No edema  Left lower leg: No edema  Neurological:      Mental Status: He is alert  Mental status is at baseline  Psychiatric:         Mood and Affect: Mood normal          Thought Content: Thought content normal          BMI Counseling:  Body mass index is 55 68 kg/m²  The BMI is above normal  Nutrition recommendations include reducing portion sizes, decreasing overall calorie intake, 3-5 servings of fruits/vegetables daily, reducing fast food intake, consuming healthier snacks, decreasing soda and/or juice intake, moderation in carbohydrate intake, reducing intake of saturated fat and trans fat and reducing intake of cholesterol  Exercise recommendations include exercising 3-5 times per week

## 2021-09-14 NOTE — PATIENT INSTRUCTIONS
On your visit today we reviewed that you did not require any labs prior to today's visit  Blood pressure remains well controlled no adjustment to your medications  As noted previously you are on atorvastatin for cardiovascular risk protection but that you have not had elevated cholesterol levels in the past       You are currently following with endocrinology for your type 2 diabetes  You are scheduled for follow-up in November, script provided today to get your A1c completed when you go for the labs for the nephrologist 1st week in October  You are aware and do confirm that you are avoiding all nephrotoxic medications such as ibuprofen, Motrin, Advil and Aleve  You may take Tylenol if necessary for pain and you will continue your gabapentin for your diabetic neuropathy  We did discuss however that with decreased kidney function, blood pressure and cholesterol levels are good but you really need to continue to work on your weight, diet and taking diabetic medications as prescribed  You are aware that the longer your diabetes remains un controlled the more potential damage and wrist to your organs including her kidneys  Once you get your labs completed for the nephrologist their office will call you to schedule your follow-up appointment  Continue follow-up and medications with your mental health providers as scheduled  You have completed your COVID vaccine series  Appointment here after your labs for me as dated in 6 months  Please remember to get your flu vaccine this fall  colonoscopy 2028        Weight Management   AMBULATORY CARE:   Why it is important to manage your weight:  Being overweight increases your risk of health conditions such as heart disease, high blood pressure, type 2 diabetes, and certain types of cancer  It can also increase your risk for osteoarthritis, sleep apnea, and other respiratory problems  Aim for a slow, steady weight loss   Even a small amount of weight loss can lower your risk of health problems  How to lose weight safely:  A safe and healthy way to lose weight is to eat fewer calories and get regular exercise  · You can lose up about 1 pound a week by decreasing the number of calories you eat by 500 calories each day  You can decrease calories by eating smaller portion sizes or by cutting out high-calorie foods  Read labels to find out how many calories are in the foods you eat  · You can also burn calories with exercise such as walking, swimming, or biking  You will be more likely to keep weight off if you make these changes part of your lifestyle  Exercise at least 30 minutes per day on most days of the week  You can also fit in more physical activity by taking the stairs instead of the elevator or parking farther away from stores  Ask your healthcare provider about the best exercise plan for you  Healthy meal plan for weight management:  A healthy meal plan includes a variety of foods, contains fewer calories, and helps you stay healthy  A healthy meal plan includes the following:     · Eat whole-grain foods more often  A healthy meal plan should contain fiber  Fiber is the part of grains, fruits, and vegetables that is not broken down by your body  Whole-grain foods are healthy and provide extra fiber in your diet  Some examples of whole-grain foods are whole-wheat breads and pastas, oatmeal, brown rice, and bulgur  · Eat a variety of vegetables every day  Include dark, leafy greens such as spinach, kale, jammie greens, and mustard greens  Eat yellow and orange vegetables such as carrots, sweet potatoes, and winter squash  · Eat a variety of fruits every day  Choose fresh or canned fruit (canned in its own juice or light syrup) instead of juice  Fruit juice has very little or no fiber  · Eat low-fat dairy foods  Drink fat-free (skim) milk or 1% milk  Eat fat-free yogurt and low-fat cottage cheese   Try low-fat cheeses such as mozzarella and other reduced-fat cheeses  · Choose meat and other protein foods that are low in fat  Choose beans or other legumes such as split peas or lentils  Choose fish, skinless poultry (chicken or turkey), or lean cuts of red meat (beef or pork)  Before you cook meat or poultry, cut off any visible fat  · Use less fat and oil  Try baking foods instead of frying them  Add less fat, such as margarine, sour cream, regular salad dressing and mayonnaise to foods  Eat fewer high-fat foods  Some examples of high-fat foods include french fries, doughnuts, ice cream, and cakes  · Eat fewer sweets  Limit foods and drinks that are high in sugar  This includes candy, cookies, regular soda, and sweetened drinks  Ways to decrease calories:   · Eat smaller portions  ? Use a small plate with smaller servings  ? Do not eat second helpings  ? When you eat at a restaurant, ask for a box and place half of your meal in the box before you eat  ? Share an entrée with someone else  · Replace high-calorie snacks with healthy, low-calorie snacks  ? Choose fresh fruit, vegetables, fat-free rice cakes, or air-popped popcorn instead of potato chips, nuts, or chocolate  ? Choose water or calorie-free drinks instead of soda or sweetened drinks  · Do not shop for groceries when you are hungry  You may be more likely to make unhealthy food choices  Take a grocery list of healthy foods and shop after you have eaten  · Eat regular meals  Do not skip meals  Skipping meals can lead to overeating later in the day  This can make it harder for you to lose weight  Eat a healthy snack in place of a meal if you do not have time to eat a regular meal  Talk with a dietitian to help you create a meal plan and schedule that is right for you  Other things to consider as you try to lose weight:   · Be aware of situations that may give you the urge to overeat, such as eating while watching television  Find ways to avoid these situations  For example, read a book, go for a walk, or do crafts  · Meet with a weight loss support group or friends who are also trying to lose weight  This may help you stay motivated to continue working on your weight loss goals  © Copyright [address] 2021 Information is for End User's use only and may not be sold, redistributed or otherwise used for commercial purposes  All illustrations and images included in CareNotes® are the copyrighted property of [employer]  or Jacobo Willis   The above information is an  only  It is not intended as medical advice for individual conditions or treatments  Talk to your doctor, nurse or pharmacist before following any medical regimen to see if it is safe and effective for you  Low Fat Diet   AMBULATORY CARE:   A low-fat diet  is an eating plan that is low in total fat, unhealthy fat, and cholesterol  You may need to follow a low-fat diet if you have trouble digesting or absorbing fat  You may also need to follow this diet if you have high cholesterol  You can also lower your cholesterol by increasing the amount of fiber in your diet  Soluble fiber is a type of fiber that helps to decrease cholesterol levels  Different types of fat in food:   · Limit unhealthy fats  A diet that is high in cholesterol, saturated fat, and trans fat may cause unhealthy cholesterol levels  Unhealthy cholesterol levels increase your risk of heart disease  ? Cholesterol:  Limit intake of cholesterol to less than 200 mg per day  Cholesterol is found in meat, eggs, and dairy  ? Saturated fat:  Limit saturated fat to less than 7% of your total daily calories  Ask your dietitian how many calories you need each day  Saturated fat is found in butter, cheese, ice cream, whole milk, and palm oil  Saturated fat is also found in meat, such as beef, pork, chicken skin, and processed meats   Processed meats include sausage, hot dogs, and bologna  ? Trans fat:  Avoid trans fat as much as possible  Trans fat is used in fried and baked foods  Foods that say trans fat free on the label may still have up to 0 5 grams of trans fat per serving  · Include healthy fats  Replace foods that are high in saturated and trans fat with foods high in healthy fats  This may help to decrease high cholesterol levels  ? Monounsaturated fats: These are found in avocados, nuts, and vegetable oils, such as olive, canola, and sunflower oil  ? Polyunsaturated fats: These can be found in vegetable oils, such as soybean or corn oil  Omega-3 fats can help to decrease the risk of heart disease  Omega-3 fats are found in fish, such as salmon, herring, trout, and tuna  Omega-3 fats can also be found in plant foods, such as walnuts, flaxseed, soybeans, and canola oil  Foods to limit or avoid:   · Grains:      ? Snacks that are made with partially hydrogenated oils, such as chips, regular crackers, and butter-flavored popcorn    ? High-fat baked goods, such as biscuits, croissants, doughnuts, pies, cookies, and pastries    · Dairy:      ? Whole milk, 2% milk, and yogurt and ice cream made with whole milk    ? Half and half creamer, heavy cream, and whipping cream    ? Cheese, cream cheese, and sour cream    · Meats and proteins:      ? High-fat cuts of meat (T-bone steak, regular hamburger, and ribs)    ? Fried meat, poultry (turkey and chicken), and fish    ? Poultry (chicken and turkey) with skin    ? Cold cuts (salami or bologna), hot dogs, allen, and sausage    ? Whole eggs and egg yolks    · Vegetables and fruits with added fat:      ? Fried vegetables or vegetables in butter or high-fat sauces, such as cream or cheese sauces    ? Fried fruit or fruit served with butter or cream    · Fats:      ? Butter, stick margarine, and shortening    ? Coconut, palm oil, and palm kernel oil    Foods to include:   · Grains:      ?  Whole-grain breads, cereals, pasta, and brown rice    ? Low-fat crackers and pretzels    · Vegetables and fruits:      ? Fresh, frozen, or canned vegetables (no salt or low-sodium)    ? Fresh, frozen, dried, or canned fruit (canned in light syrup or fruit juice)    ? Avocado    · Low-fat dairy products:      ? Nonfat (skim) or 1% milk    ? Nonfat or low-fat cheese, yogurt, and cottage cheese    · Meats and proteins:      ? Chicken or turkey with no skin    ? Baked or broiled fish    ? Lean beef and pork (loin, round, extra lean hamburger)    ? Beans and peas, unsalted nuts, soy products    ? Egg whites and substitutes    ? Seeds and nuts    · Fats:      ? Unsaturated oil, such as canola, olive, peanut, soybean, or sunflower oil    ? Soft or liquid margarine and vegetable oil spread    ? Low-fat salad dressing    Other ways to decrease fat:   · Read food labels before you buy foods  Choose foods that have less than 30% of calories from fat  Choose low-fat or fat-free dairy products  Remember that fat free does not mean calorie free  These foods still contain calories, and too many calories can lead to weight gain  · Trim fat from meat and avoid fried food  Trim all visible fat from meat before you cook it  Remove the skin from poultry  Do not phelps meat, fish, or poultry  Bake, roast, boil, or broil these foods instead  Avoid fried foods  Eat a baked potato instead of [place] fries  Steam vegetables instead of sautéing them in butter  · Add less fat to foods  Use imitation aleln bits on salads and baked potatoes instead of regular allen bits  Use fat-free or low-fat salad dressings instead of regular dressings  Use low-fat or nonfat butter-flavored topping instead of regular butter or margarine on popcorn and other foods  Ways to decrease fat in recipes:  Replace high-fat ingredients with low-fat or nonfat ones  This may cause baked goods to be drier than usual  You may need to use nonfat cooking spray on pans to prevent food from sticking  You also may need to change the amount of other ingredients, such as water, in the recipe  Try the following:  · Use low-fat or light margarine instead of regular margarine or shortening  · Use lean ground turkey breast or chicken, or lean ground beef (less than 5% fat) instead of hamburger  · Add 1 teaspoon of canola oil to 8 ounces of skim milk instead of using cream or half and half  · Use grated zucchini, carrots, or apples in breads instead of coconut  · Use blenderized, low-fat cottage cheese, plain tofu, or low-fat ricotta cheese instead of cream cheese  · Use 1 egg white and 1 teaspoon of canola oil, or use ¼ cup (2 ounces) of fat-free egg substitute instead of a whole egg  · Replace half of the oil that is called for in a recipe with applesauce when you bake  Use 3 tablespoons of cocoa powder and 1 tablespoon of canola oil instead of a square of baking chocolate  How to increase fiber:  Eat enough high-fiber foods to get 20 to 30 grams of fiber every day  Slowly increase your fiber intake to avoid stomach cramps, gas, and other problems  · Eat 3 ounces of whole-grain foods each day  An ounce is about 1 slice of bread  Eat whole-grain breads, such as whole-wheat bread  Whole wheat, whole-wheat flour, or other whole grains should be listed as the first ingredient on the food label  Replace white flour with whole-grain flour or use half of each in recipes  Whole-grain flour is heavier than white flour, so you may have to add more yeast or baking powder  · Eat a high-fiber cereal for breakfast   Oatmeal is a good source of soluble fiber  Look for cereals that have bran or fiber in the name  Choose whole-grain products, such as brown rice, barley, and whole-wheat pasta  · Eat more beans, peas, and lentils  For example, add beans to soups or salads  Eat at least 5 cups of fruits and vegetables each day   Eat fruits and vegetables with the peel because the peel is high in fiber     © Copyright [employer] 2021 Information is for End User's use only and may not be sold, redistributed or otherwise used for commercial purposes  All illustrations and images included in CareNotes® are the copyrighted property of [employer]  or Jacobo Peterson  The above information is an  only  It is not intended as medical advice for individual conditions or treatments  Talk to your doctor, nurse or pharmacist before following any medical regimen to see if it is safe and effective for you  Heart Healthy Diet   AMBULATORY CARE:   A heart healthy diet  is an eating plan low in unhealthy fats and sodium (salt)  The plan is high in healthy fats and fiber  A heart healthy diet helps improve your cholesterol levels and lowers your risk for heart disease and stroke  A dietitian will teach you how to read and understand food labels  Heart healthy diet guidelines to follow:   · Choose foods that contain healthy fats  ? Unsaturated fats  include monounsaturated and polyunsaturated fats  Unsaturated fat is found in foods such as soybean, canola, olive, corn, and safflower oils  It is also found in soft tub margarine that is made with liquid vegetable oil  ? Omega-3 fat  is found in certain fish, such as salmon, tuna, and trout, and in walnuts and flaxseed  Eat fish high in omega-3 fats at least 2 times a week  · Get 20 to 30 grams of fiber each day  Fruits, vegetables, whole-grain foods, and legumes (cooked beans) are good sources of fiber  · Limit or do not have unhealthy fats  ? Cholesterol  is found in animal foods, such as eggs and lobster, and in dairy products made from whole milk  Limit cholesterol to less than 200 mg each day  ? Saturated fat  is found in meats, such as allen and hamburger  It is also found in chicken or turkey skin, whole milk, and butter  Limit saturated fat to less than 7% of your total daily calories      ? Trans fat  is found in packaged foods, such as potato chips and cookies  It is also in hard margarine, some fried foods, and shortening  Do not eat foods that contain trans fats  · Limit sodium as directed  You may be told to limit sodium to 2,000 to 2,300 mg each day  Choose low-sodium or no-salt-added foods  Add little or no salt to food you prepare  Use herbs and spices in place of salt  Include the following in your heart healthy plan:  Ask your dietitian or healthcare provider how many servings to have from each of the following food groups:  · Grains:      ? Whole-wheat breads, cereals, and pastas, and brown rice    ? Low-fat, low-sodium crackers and chips    · Vegetables:      ? Broccoli, green beans, green peas, and spinach    ? Collards, kale, and lima beans    ? Carrots, sweet potatoes, tomatoes, and peppers    ? Canned vegetables with no salt added    · Fruits:      ? Bananas, peaches, pears, and pineapple    ? Grapes, raisins, and dates    ? Oranges, tangerines, grapefruit, orange juice, and grapefruit juice    ? Apricots, mangoes, melons, and papaya    ? Raspberries and strawberries    ? Canned fruit with no added sugar    · Low-fat dairy:      ? Nonfat (skim) milk, 1% milk, and low-fat almond, cashew, or soy milks fortified with calcium    ? Low-fat cheese, regular or frozen yogurt, and cottage cheese    · Meats and proteins:      ? Lean cuts of beef and pork (loin, leg, round), skinless chicken and turkey    ? Legumes, soy products, egg whites, or nuts    Limit or do not include the following in your heart healthy plan:   · Unhealthy fats and oils:      ? Whole or 2% milk, cream cheese, sour cream, or cheese    ? High-fat cuts of beef (T-bone steaks, ribs), chicken or turkey with skin, and organ meats such as liver    ?  Butter, stick margarine, shortening, and cooking oils such as coconut or palm oil    · Foods and liquids high in sodium:      ? Packaged foods, such as frozen dinners, cookies, macaroni and cheese, and cereals with more than 300 mg of sodium per serving    ? Vegetables with added sodium, such as instant potatoes, vegetables with added sauces, or regular canned vegetables    ? Cured or smoked meats, such as hot dogs, allen, and sausage    ? High-sodium ketchup, barbecue sauce, salad dressing, pickles, olives, soy sauce, or miso    · Foods and liquids high in sugar:      ? Candy, cake, cookies, pies, or doughnuts    ? Soft drinks (soda), sports drinks, or sweetened tea    ? Canned or dry mixes for cakes, soups, sauces, or gravies    Other healthy heart guidelines:   · Do not smoke  Nicotine and other chemicals in cigarettes and cigars can cause lung and heart damage  Ask your healthcare provider for information if you currently smoke and need help to quit  E-cigarettes or smokeless tobacco still contain nicotine  Talk to your healthcare provider before you use these products  · Limit or do not drink alcohol as directed  Alcohol can damage your heart and raise your blood pressure  Your healthcare provider may give you specific daily and weekly limits  The general recommended limit is 1 drink a day for women 21 or older and for men 72 or older  Do not have more than 3 drinks in a day or 7 in a week  The recommended limit is 2 drinks a day for men 24to 59years of age  Do not have more than 4 drinks in a day or 14 in a week  A drink of alcohol is 12 ounces of beer, 5 ounces of wine, or 1½ ounces of liquor  · Exercise regularly  Exercise can help you maintain a healthy weight and improve your blood pressure and cholesterol levels  Regular exercise can also decrease your risk for heart problems  Ask your healthcare provider about the best exercise plan for you  Do not start an exercise program without asking your healthcare provider  Follow up with your doctor or cardiologist as directed:  Write down your questions so you remember to ask them during your visits    © [address] Information is for End User's use only and may not be sold, redistributed or otherwise used for commercial purposes  All illustrations and images included in CareNotes® are the copyrighted property of [employer]  or Jacobo Peterson  The above information is an  only  It is not intended as medical advice for individual conditions or treatments  Talk to your doctor, nurse or pharmacist before following any medical regimen to see if it is safe and effective for you

## 2021-09-17 ENCOUNTER — TELEPHONE (OUTPATIENT)
Dept: NEPHROLOGY | Facility: CLINIC | Age: 56
End: 2021-09-17
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
## 2021-09-22 DIAGNOSIS — E11.65 TYPE 2 DIABETES MELLITUS WITH HYPERGLYCEMIA, UNSPECIFIED WHETHER LONG TERM INSULIN USE (HCC): ICD-10-CM

## 2021-09-22 RX ORDER — INSULIN GLARGINE 100 [IU]/ML
INJECTION, SOLUTION SUBCUTANEOUS
Qty: 15 ML | Refills: 0 | Status: SHIPPED | OUTPATIENT
Start: 2021-09-22 | End: 2022-02-14

## 2021-09-23 ENCOUNTER — OFFICE VISIT (OUTPATIENT)
Dept: PSYCHIATRY | Facility: CLINIC | Age: 56
End: 2021-09-23
Payer: MEDICARE

## 2021-09-23 VITALS
BODY MASS INDEX: 55.68 KG/M2 | HEIGHT: 66 IN | SYSTOLIC BLOOD PRESSURE: 119 MMHG | HEART RATE: 88 BPM | DIASTOLIC BLOOD PRESSURE: 82 MMHG

## 2021-09-23 DIAGNOSIS — R29.818 EXTRAPYRAMIDAL SYMPTOM: ICD-10-CM

## 2021-09-23 DIAGNOSIS — F31.61 BIPOLAR DISORDER, CURRENT EPISODE MIXED, MILD (HCC): ICD-10-CM

## 2021-09-23 DIAGNOSIS — F41.0 PANIC ATTACKS: ICD-10-CM

## 2021-09-23 DIAGNOSIS — G47.00 INSOMNIA, UNSPECIFIED TYPE: ICD-10-CM

## 2021-09-23 DIAGNOSIS — F41.1 GENERALIZED ANXIETY DISORDER: Primary | Chronic | ICD-10-CM

## 2021-09-23 PROCEDURE — 99213 OFFICE O/P EST LOW 20 MIN: CPT | Performed by: PHYSICIAN ASSISTANT

## 2021-09-23 RX ORDER — CLONAZEPAM 1 MG/1
TABLET ORAL
Qty: 30 TABLET | Refills: 2 | Status: SHIPPED | OUTPATIENT
Start: 2021-09-23 | End: 2021-12-09 | Stop reason: SDUPTHER

## 2021-09-23 RX ORDER — BUPROPION HYDROCHLORIDE 100 MG/1
100 TABLET ORAL DAILY
Qty: 90 TABLET | Refills: 0 | Status: SHIPPED | OUTPATIENT
Start: 2021-09-23 | End: 2021-12-09 | Stop reason: SDUPTHER

## 2021-09-23 RX ORDER — BENZTROPINE MESYLATE 1 MG/1
1 TABLET ORAL DAILY PRN
Qty: 90 TABLET | Refills: 0 | Status: SHIPPED | OUTPATIENT
Start: 2021-09-23 | End: 2021-12-09 | Stop reason: SDUPTHER

## 2021-09-23 RX ORDER — BUSPIRONE HYDROCHLORIDE 15 MG/1
15 TABLET ORAL 2 TIMES DAILY
Qty: 180 TABLET | Refills: 0 | Status: SHIPPED | OUTPATIENT
Start: 2021-09-23 | End: 2021-12-09 | Stop reason: SDUPTHER

## 2021-09-23 RX ORDER — TRAZODONE HYDROCHLORIDE 50 MG/1
TABLET ORAL
Qty: 90 TABLET | Refills: 0 | Status: SHIPPED | OUTPATIENT
Start: 2021-09-23 | End: 2021-12-09 | Stop reason: SDUPTHER

## 2021-09-28 ENCOUNTER — OFFICE VISIT (OUTPATIENT)
Dept: SLEEP CENTER | Facility: CLINIC | Age: 56
End: 2021-09-28
Payer: MEDICARE

## 2021-09-28 VITALS
BODY MASS INDEX: 50.62 KG/M2 | HEIGHT: 66 IN | WEIGHT: 315 LBS | DIASTOLIC BLOOD PRESSURE: 78 MMHG | SYSTOLIC BLOOD PRESSURE: 124 MMHG | HEART RATE: 100 BPM

## 2021-09-28 DIAGNOSIS — G47.33 OSA (OBSTRUCTIVE SLEEP APNEA): Primary | ICD-10-CM

## 2021-09-28 PROCEDURE — 99213 OFFICE O/P EST LOW 20 MIN: CPT | Performed by: INTERNAL MEDICINE

## 2021-09-28 NOTE — PROGRESS NOTES
Review of Systems      Genitourinary none   Cardiology none   Gastrointestinal none   Neurology none   Constitutional none   Integumentary itching   Psychiatry none   Musculoskeletal none   Pulmonary none   ENT none   Endocrine none   Hematological none

## 2021-09-28 NOTE — PROGRESS NOTES
Progress Note - Sleep Center   Marcie Adames :1965 MRN: [id number]      Reason for Visit:  55 y  o male here for annual follow-up    Assessment:  Doing well on current therapy of APAP 11-17 cm for severe DAISY (AHI = 60 0)  Plan:  Continue same    Follow up: One year    History of Present Illness:  History of DAISY on PAP therapy  Fully compliant and deriving benefit  ROS:  No genitourinary, cardiac, gastrointestinal, neurologic, psychiatric, musculoskeletal, ENT or hematologic symptoms  I have reviewed and updated the review of systems as necessary      Historical Information    Past Medical History:   Past Medical History:   Diagnosis Date    ADHD, adult residual type     Anxiety     Anxiety     B[facility] 1 disorder ([facility]     Cataract     Left eye    Depression     Depression     Diabetes mellitus ([facility] )     blood sugar 167 on admission    Equinus deformity of foot     GERD (gastroesophageal reflux disease)     Homicidal ideations     Hyperlipidemia     Hypertension     Microalbuminuria     Morbid obesity (HCC)     Neuropathy     Shortness of breath     Sleep apnea     CPAP at bedtime    Sleep apnea     Stroke ([facility] )     Suicidal intent          Past Surgical History:   Past Surgical History:   Procedure Laterality Date    CATARACT EXTRACTION      CATARACT EXTRACTION      HAND SURGERY Right     OTHER SURGICAL HISTORY      REPAIR OF SUPERFICIAL WOUND FACE    CA ESOPHAGOGASTRODUODENOSCOPY TRANSORAL DIAGNOSTIC N/A 2018    Procedure: ESOPHAGOGASTRODUODENOSCOPY (EGD); Surgeon: Makenna Littlejohn MD;  Location: [facility]; Service: Gastroenterology    CA ESOPHAGOGASTRODUODENOSCOPY TRANSORAL DIAGNOSTIC N/A 2018    Procedure: EGD AND COLONOSCOPY;  Surgeon: Makenna Littlejohn MD;  Location: [facility];   Service: Gastroenterology       Social History:   Social History     Socioeconomic History    Marital status: /Civil Union     Spouse name: None    Number of children: 1    Years of education: None    Highest education level: None   Occupational History    Occupation: On disability   Tobacco Use    Smoking status: Former Smoker     Types: Cigarettes     Quit date:      Years since quittin 7    Smokeless tobacco: Former User     Types: Chew    Tobacco comment: pt "Quit after approx over 25 years ago"   Vaping Use    Vaping Use: Never used   Substance and Sexual Activity    Alcohol use: Yes     Comment: rarely    Drug use: Not Currently     Comment: quit 20 years ago    Sexual activity: Yes     Partners: Female     Birth control/protection: None     Comment: steady girlfriend   Other Topics Concern    None   Social History Narrative     from spouse, technically still  but living with other partner, partner's father, and early 2019, his partner's daughter and boyfriend moved in with their two children  Then the boyfriend moved out in 2019  Children: 1 stepdaughter         Education:    Pt denies any hx of learning disabilities and reached childhood milestones on time as far as he knows  He wore braces for equinovarus but states he did not suffer delay in walking    Graduated High School --he was held back 3 times because "I was just lazy, didn't do the work "    No college    Took some core classes in [employer] and worked as a volunteer  from approx [id number]             Substance Abuse History:     Pt drinks ETOH approx q 3-4 months but denies any h/o ETOH abuse  Pt tried smoking Crack once in the past and has been smoking THC once q 2-3 months since 14y/o  He denies other drug use, IVDA, addictions or drug abuse           Social Determinants of Health     Financial Resource Strain: Low Risk     Difficulty of Paying Living Expenses: Not hard at all   Food Insecurity: No Food Insecurity    Worried About Running Out of Food in the Last Year: Never true    Krissy of Food in the Last Year: Never true   Transportation Needs: No Transportation Needs    Lack of Transportation (Medical): No    Lack of Transportation (Non-Medical): No   Physical Activity: Inactive    Days of Exercise per Week: 0 days    Minutes of Exercise per Session: 0 min   Stress: No Stress Concern Present    Feeling of Stress : Not at all   Social Connections:  Moderately Isolated    Frequency of Communication with Friends and Family: More than three times a week    Frequency of Social Gatherings with Friends and Family: Once a week    Attends Hinduism Services: Never    Active Member of Clubs or Organizations: No    Attends Club or Organization Meetings: Never    Marital Status: Living with partner   Intimate Partner Violence: Not At Risk    Fear of Current or Ex-Partner: No    Emotionally Abused: No    Physically Abused: No    Sexually Abused: No       Family History:   Family History   Problem Relation Age of Onset    Diabetes Mother     Alcohol abuse Father     Diabetes Father     Hypertension Father     Lung cancer Father     Stroke Father     Cancer Father         lung    Alcohol abuse Sister     Depression Sister     Lymphoma Sister     Alcohol abuse Family     Alcohol abuse Sister     Alcohol abuse Sister     Cancer Sister     Heart disease Neg Hx     Thyroid disease Neg Hx        Medications/Allergies:      Current Outpatient Medications:     amLODIPine (NORVASC) 5 mg tablet, TAKE 1 TABLET(5 MG) BY MOUTH DAILY, Disp: 90 tablet, Rfl: 1    ascorbic acid (VITAMIN C) 250 mg tablet, Take 1 tablet (250 mg total) by mouth daily, Disp: 90 tablet, Rfl: 3    atorvastatin (LIPITOR) 10 mg tablet, TAKE 1 TABLET(10 MG) BY MOUTH DAILY, Disp: 90 tablet, Rfl: 1    benztropine (COGENTIN) 1 mg tablet, Take 1 tablet (1 mg total) by mouth daily as needed for tremors, Disp: 90 tablet, Rfl: 0    Blood Glucose Monitoring Suppl (FREESTYLE LITE) ANTONIA, by Does not apply route 3 (three) times a day, Disp: 1 each, Rfl: 0    buPROPion [facility]) 100 mg tablet, Take 1 tablet (100 mg total) by mouth daily, Disp: 90 tablet, Rfl: 0    busPIRone (BUSPAR) 15 mg tablet, Take 1 tablet (15 mg total) by mouth 2 (two) times a day, Disp: 180 tablet, Rfl: 0    clonazePAM (KlonoPIN) 1 mg tablet, Take 1 tab po qd prn anxiety or panic attacks, Disp: 30 tablet, Rfl: 2    ferrous sulfate (EQL Iron Supplement Therapy) 325 (65 Fe) mg tablet, Take 1 tablet (325 mg total) by mouth daily with breakfast, Disp: 90 tablet, Rfl: 2    FREESTYLE LITE test strip, CHECK BLOOD SUGARS FOUR TIMES DAILY, Disp: 400 strip, Rfl: 3    gabapentin (NEURONTIN) 300 mg capsule, TAKE 1 CAPSULE(300 MG) BY MOUTH TWICE DAILY, Disp: 180 capsule, Rfl: 1    insulin lispro (HumaLOG) 100 units/mL injection pen, INJECT 28 UNITS THREE TIMES DAILY WITH MEALS PLUS SCALE(UP  UNITS DAILY), Disp: 30 pen, Rfl: 0    Insulin Pen Needle (B-D ULTRAFINE III SHORT PEN) 31G X 8 MM MISC, by Does not apply route, Disp: , Rfl:     INSULIN SYRINGE  5CC/29G 29G X 1/2" 0 5 ML MISC, by Does not apply route, Disp: , Rfl:     Lancets (FREESTYLE) lancets, USE THREE TIMES DAILY AS DIRECTED, Disp: 300 each, Rfl: 0    Lantus SoloStar 100 units/mL injection pen, ADMINISTER 50 UNITS UNDER THE SKIN EVERY 12 HOURS, Disp: 15 mL, Rfl: 0    lisinopril-hydrochlorothiazide (PRINZIDE,ZESTORETIC) 20-25 MG per tablet, TAKE 1 TABLET BY MOUTH DAILY, Disp: 90 tablet, Rfl: 1    lurasidone (Latuda) 120 mg tablet, Take 1 tablet (120 mg total) by mouth daily with dinner, Disp: 90 tablet, Rfl: 0    omeprazole (PriLOSEC) 20 mg delayed release capsule, TAKE 1 CAPSULE(20 MG) BY MOUTH DAILY, Disp: 90 capsule, Rfl: 1    traZODone (DESYREL) 50 mg tablet, Take 1/2 to 1 tab po qhs prn insomnia, Disp: 90 tablet, Rfl: 0    Trulicity 1 5 OX/7 5QV SOPN, ADMINISTER 1 5 MG UNDER THE SKIN 1 TIME A WEEK, Disp: 6 mL, Rfl: 0          Objective      Vital Signs:   Vitals:    09/28/21 1258   BP: 124/78   Pulse: 100     [place] Sleepiness Scale:          Physical Exam:    General: Alert, appropriate, cooperative, overweight    Head: NC/AT    Skin: Warm, dry    Neuro: No motor abnormalities, cranial nerves appear intact    Extremity: No clubbing, cyanosis      DME Provider: [employer]        Counseling / Coordination of Care   I have spent 10 minutes with the patient today in which greater than 50% of this time was spent in counseling/coordination of care regarding: equipment and compliance  Board Certified Sleep Specialist    Portions of the record may have been created with voice recognition software  Occasional wrong word or "sound a like" substitutions may have occurred due to the inherent limitations of voice recognition software  Read the chart carefully and recognize, using context, where substitutions have occurred

## 2021-09-29 ENCOUNTER — TELEPHONE (OUTPATIENT)
Dept: SLEEP CENTER | Facility: CLINIC | Age: 56
End: 2021-09-29

## 2021-11-02 ENCOUNTER — SOCIAL WORK (OUTPATIENT)
Dept: BEHAVIORAL/MENTAL HEALTH CLINIC | Facility: CLINIC | Age: 56
End: 2021-11-02
Payer: MEDICARE

## 2021-11-02 DIAGNOSIS — F31.76 BIPOLAR I DISORDER, MOST RECENT EPISODE DEPRESSED, IN FULL REMISSION (HCC): Primary | ICD-10-CM

## 2021-11-02 PROCEDURE — 90834 PSYTX W PT 45 MINUTES: CPT | Performed by: SOCIAL WORKER

## 2021-11-09 ENCOUNTER — APPOINTMENT (OUTPATIENT)
Dept: LAB | Facility: CLINIC | Age: 56
End: 2021-11-09
Payer: MEDICARE

## 2021-11-09 DIAGNOSIS — G47.33 OSA (OBSTRUCTIVE SLEEP APNEA): ICD-10-CM

## 2021-11-09 DIAGNOSIS — D63.1 ANEMIA DUE TO STAGE 3A CHRONIC KIDNEY DISEASE (HCC): ICD-10-CM

## 2021-11-09 DIAGNOSIS — I12.9 BENIGN HYPERTENSION WITH CKD (CHRONIC KIDNEY DISEASE) STAGE III (HCC): ICD-10-CM

## 2021-11-09 DIAGNOSIS — R80.1 PERSISTENT PROTEINURIA: ICD-10-CM

## 2021-11-09 DIAGNOSIS — N18.31 ANEMIA DUE TO STAGE 3A CHRONIC KIDNEY DISEASE (HCC): ICD-10-CM

## 2021-11-09 DIAGNOSIS — E55.9 VITAMIN D DEFICIENCY: ICD-10-CM

## 2021-11-09 DIAGNOSIS — N18.30 BENIGN HYPERTENSION WITH CKD (CHRONIC KIDNEY DISEASE) STAGE III (HCC): ICD-10-CM

## 2021-11-09 DIAGNOSIS — E78.2 MIXED HYPERLIPIDEMIA: ICD-10-CM

## 2021-11-09 DIAGNOSIS — K21.9 GASTROESOPHAGEAL REFLUX DISEASE, UNSPECIFIED WHETHER ESOPHAGITIS PRESENT: ICD-10-CM

## 2021-11-09 DIAGNOSIS — N18.31 STAGE 3A CHRONIC KIDNEY DISEASE (HCC): ICD-10-CM

## 2021-11-09 LAB
25(OH)D3 SERPL-MCNC: 30.8 NG/ML (ref 30–100)
ALBUMIN SERPL BCP-MCNC: 2.9 G/DL (ref 3.5–5)
ANION GAP SERPL CALCULATED.3IONS-SCNC: 6 MMOL/L (ref 4–13)
BACTERIA UR QL AUTO: ABNORMAL /HPF
BILIRUB UR QL STRIP: NEGATIVE
BUN SERPL-MCNC: 18 MG/DL (ref 5–25)
CALCIUM ALBUM COR SERPL-MCNC: 9.7 MG/DL (ref 8.3–10.1)
CALCIUM SERPL-MCNC: 8.8 MG/DL (ref 8.3–10.1)
CHLORIDE SERPL-SCNC: 103 MMOL/L (ref 100–108)
CLARITY UR: ABNORMAL
CO2 SERPL-SCNC: 25 MMOL/L (ref 21–32)
COLOR UR: ABNORMAL
CREAT SERPL-MCNC: 1.95 MG/DL (ref 0.6–1.3)
CREAT UR-MCNC: 85 MG/DL
ERYTHROCYTE [DISTWIDTH] IN BLOOD BY AUTOMATED COUNT: 14.9 % (ref 11.6–15.1)
EST. AVERAGE GLUCOSE BLD GHB EST-MCNC: 160 MG/DL
GFR SERPL CREATININE-BSD FRML MDRD: 43 ML/MIN/1.73SQ M
GLUCOSE P FAST SERPL-MCNC: 121 MG/DL (ref 65–99)
GLUCOSE UR STRIP-MCNC: NEGATIVE MG/DL
HBA1C MFR BLD: 7.2 %
HCT VFR BLD AUTO: 35.9 % (ref 36.5–49.3)
HGB BLD-MCNC: 11.7 G/DL (ref 12–17)
HGB UR QL STRIP.AUTO: ABNORMAL
KETONES UR STRIP-MCNC: NEGATIVE MG/DL
LEUKOCYTE ESTERASE UR QL STRIP: ABNORMAL
MAGNESIUM SERPL-MCNC: 1.8 MG/DL (ref 1.6–2.6)
MCH RBC QN AUTO: 24.6 PG (ref 26.8–34.3)
MCHC RBC AUTO-ENTMCNC: 32.6 G/DL (ref 31.4–37.4)
MCV RBC AUTO: 75 FL (ref 82–98)
NITRITE UR QL STRIP: NEGATIVE
NON-SQ EPI CELLS URNS QL MICRO: ABNORMAL /HPF
PH UR STRIP.AUTO: 6 [PH]
PHOSPHATE SERPL-MCNC: 3.7 MG/DL (ref 2.7–4.5)
PLATELET # BLD AUTO: 211 THOUSANDS/UL (ref 149–390)
PMV BLD AUTO: 9.7 FL (ref 8.9–12.7)
POTASSIUM SERPL-SCNC: 4.5 MMOL/L (ref 3.5–5.3)
PROT UR STRIP-MCNC: NEGATIVE MG/DL
PROT UR-MCNC: 30 MG/DL
PROT/CREAT UR: 0.35 MG/G{CREAT} (ref 0–0.1)
PTH-INTACT SERPL-MCNC: 64.7 PG/ML (ref 18.4–80.1)
RBC # BLD AUTO: 4.76 MILLION/UL (ref 3.88–5.62)
RBC #/AREA URNS AUTO: ABNORMAL /HPF
SODIUM SERPL-SCNC: 134 MMOL/L (ref 136–145)
SP GR UR STRIP.AUTO: 1.01 (ref 1–1.03)
UROBILINOGEN UR QL STRIP.AUTO: 0.2 E.U./DL
WBC # BLD AUTO: 7.33 THOUSAND/UL (ref 4.31–10.16)
WBC #/AREA URNS AUTO: ABNORMAL /HPF

## 2021-11-09 PROCEDURE — 81001 URINALYSIS AUTO W/SCOPE: CPT

## 2021-11-09 PROCEDURE — 83970 ASSAY OF PARATHORMONE: CPT

## 2021-11-09 PROCEDURE — 82306 VITAMIN D 25 HYDROXY: CPT

## 2021-11-09 PROCEDURE — 36415 COLL VENOUS BLD VENIPUNCTURE: CPT | Performed by: PHYSICIAN ASSISTANT

## 2021-11-09 PROCEDURE — 85027 COMPLETE CBC AUTOMATED: CPT

## 2021-11-09 PROCEDURE — 82570 ASSAY OF URINE CREATININE: CPT

## 2021-11-09 PROCEDURE — 84156 ASSAY OF PROTEIN URINE: CPT

## 2021-11-09 PROCEDURE — 80069 RENAL FUNCTION PANEL: CPT

## 2021-11-09 PROCEDURE — 83735 ASSAY OF MAGNESIUM: CPT

## 2021-11-09 PROCEDURE — 83036 HEMOGLOBIN GLYCOSYLATED A1C: CPT | Performed by: PHYSICIAN ASSISTANT

## 2021-11-16 ENCOUNTER — TELEPHONE (OUTPATIENT)
Dept: INTERNAL MEDICINE CLINIC | Facility: CLINIC | Age: 56
End: 2021-11-16

## 2021-11-16 ENCOUNTER — OFFICE VISIT (OUTPATIENT)
Dept: NEPHROLOGY | Facility: CLINIC | Age: 56
End: 2021-11-16
Payer: MEDICARE

## 2021-11-16 VITALS
HEIGHT: 66 IN | SYSTOLIC BLOOD PRESSURE: 122 MMHG | WEIGHT: 315 LBS | BODY MASS INDEX: 50.62 KG/M2 | DIASTOLIC BLOOD PRESSURE: 76 MMHG

## 2021-11-16 DIAGNOSIS — E11.22 TYPE 2 DIABETES MELLITUS WITH STAGE 3 CHRONIC KIDNEY DISEASE AND HYPERTENSION (HCC): Primary | ICD-10-CM

## 2021-11-16 DIAGNOSIS — N18.30 TYPE 2 DIABETES MELLITUS WITH STAGE 3 CHRONIC KIDNEY DISEASE AND HYPERTENSION (HCC): Primary | ICD-10-CM

## 2021-11-16 DIAGNOSIS — I12.9 TYPE 2 DIABETES MELLITUS WITH STAGE 3 CHRONIC KIDNEY DISEASE AND HYPERTENSION (HCC): Primary | ICD-10-CM

## 2021-11-16 DIAGNOSIS — R80.1 PERSISTENT PROTEINURIA: ICD-10-CM

## 2021-11-16 PROCEDURE — 99213 OFFICE O/P EST LOW 20 MIN: CPT | Performed by: INTERNAL MEDICINE

## 2021-11-22 ENCOUNTER — OFFICE VISIT (OUTPATIENT)
Dept: MULTI SPECIALTY CLINIC | Facility: CLINIC | Age: 56
End: 2021-11-22

## 2021-11-22 VITALS
BODY MASS INDEX: 54.39 KG/M2 | SYSTOLIC BLOOD PRESSURE: 130 MMHG | WEIGHT: 315 LBS | DIASTOLIC BLOOD PRESSURE: 83 MMHG | TEMPERATURE: 98.2 F | HEART RATE: 99 BPM

## 2021-11-22 DIAGNOSIS — B35.1 ONYCHOMYCOSIS: ICD-10-CM

## 2021-11-22 DIAGNOSIS — Z79.4 TYPE 2 DIABETES MELLITUS WITH HYPERGLYCEMIA, WITH LONG-TERM CURRENT USE OF INSULIN (HCC): ICD-10-CM

## 2021-11-22 DIAGNOSIS — L85.3 XEROSIS OF SKIN: Primary | ICD-10-CM

## 2021-11-22 DIAGNOSIS — E11.65 TYPE 2 DIABETES MELLITUS WITH HYPERGLYCEMIA, WITH LONG-TERM CURRENT USE OF INSULIN (HCC): ICD-10-CM

## 2021-11-22 PROCEDURE — 99213 OFFICE O/P EST LOW 20 MIN: CPT | Performed by: PODIATRIST

## 2021-11-22 PROCEDURE — 11721 DEBRIDE NAIL 6 OR MORE: CPT | Performed by: PODIATRIST

## 2021-11-22 RX ORDER — AMMONIUM LACTATE 12 G/100G
CREAM TOPICAL AS NEEDED
Qty: 385 G | Refills: 0 | Status: SHIPPED | OUTPATIENT
Start: 2021-11-22

## 2021-11-24 DIAGNOSIS — K21.00 GASTROESOPHAGEAL REFLUX DISEASE WITH ESOPHAGITIS: ICD-10-CM

## 2021-11-24 DIAGNOSIS — E11.65 TYPE 2 DIABETES MELLITUS WITH HYPERGLYCEMIA, UNSPECIFIED WHETHER LONG TERM INSULIN USE (HCC): ICD-10-CM

## 2021-11-24 DIAGNOSIS — K22.2 LOWER ESOPHAGEAL RING (SCHATZKI): ICD-10-CM

## 2021-11-24 DIAGNOSIS — K44.9 HIATAL HERNIA: ICD-10-CM

## 2021-11-24 DIAGNOSIS — E11.42 DIABETIC POLYNEUROPATHY ASSOCIATED WITH TYPE 2 DIABETES MELLITUS (HCC): ICD-10-CM

## 2021-11-24 DIAGNOSIS — I10 BENIGN ESSENTIAL HYPERTENSION: ICD-10-CM

## 2021-11-24 RX ORDER — AMLODIPINE BESYLATE 5 MG/1
5 TABLET ORAL DAILY
Qty: 90 TABLET | Refills: 1 | Status: SHIPPED | OUTPATIENT
Start: 2021-11-24 | End: 2022-05-27

## 2021-11-24 RX ORDER — ATORVASTATIN CALCIUM 10 MG/1
10 TABLET, FILM COATED ORAL DAILY
Qty: 90 TABLET | Refills: 1 | Status: SHIPPED | OUTPATIENT
Start: 2021-11-24 | End: 2022-05-11

## 2021-11-24 RX ORDER — LISINOPRIL AND HYDROCHLOROTHIAZIDE 25; 20 MG/1; MG/1
1 TABLET ORAL DAILY
Qty: 90 TABLET | Refills: 1 | Status: SHIPPED | OUTPATIENT
Start: 2021-11-24 | End: 2022-05-27

## 2021-11-24 RX ORDER — OMEPRAZOLE 20 MG/1
20 CAPSULE, DELAYED RELEASE ORAL DAILY
Qty: 90 CAPSULE | Refills: 1 | Status: SHIPPED | OUTPATIENT
Start: 2021-11-24 | End: 2022-05-27

## 2021-11-24 RX ORDER — GABAPENTIN 300 MG/1
CAPSULE ORAL
Qty: 180 CAPSULE | Refills: 1 | Status: SHIPPED | OUTPATIENT
Start: 2021-11-24 | End: 2022-05-27

## 2021-12-02 ENCOUNTER — SOCIAL WORK (OUTPATIENT)
Dept: BEHAVIORAL/MENTAL HEALTH CLINIC | Facility: CLINIC | Age: 56
End: 2021-12-02
Payer: MEDICARE

## 2021-12-02 DIAGNOSIS — F41.1 GENERALIZED ANXIETY DISORDER: Primary | ICD-10-CM

## 2021-12-02 DIAGNOSIS — F31.62 BIPOLAR DISORDER, CURRENT EPISODE MIXED, MODERATE (HCC): ICD-10-CM

## 2021-12-02 PROCEDURE — 90834 PSYTX W PT 45 MINUTES: CPT | Performed by: SOCIAL WORKER

## 2021-12-09 ENCOUNTER — OFFICE VISIT (OUTPATIENT)
Dept: PSYCHIATRY | Facility: CLINIC | Age: 56
End: 2021-12-09
Payer: MEDICARE

## 2021-12-09 DIAGNOSIS — F41.0 PANIC ATTACKS: ICD-10-CM

## 2021-12-09 DIAGNOSIS — R29.818 EXTRAPYRAMIDAL SYMPTOM: ICD-10-CM

## 2021-12-09 DIAGNOSIS — G47.00 INSOMNIA, UNSPECIFIED TYPE: ICD-10-CM

## 2021-12-09 DIAGNOSIS — F41.1 GENERALIZED ANXIETY DISORDER: Primary | Chronic | ICD-10-CM

## 2021-12-09 DIAGNOSIS — F31.61 BIPOLAR DISORDER, CURRENT EPISODE MIXED, MILD (HCC): ICD-10-CM

## 2021-12-09 PROCEDURE — 99213 OFFICE O/P EST LOW 20 MIN: CPT | Performed by: PHYSICIAN ASSISTANT

## 2021-12-09 RX ORDER — BENZTROPINE MESYLATE 2 MG/1
2 TABLET ORAL
Qty: 90 TABLET | Refills: 0 | Status: SHIPPED | OUTPATIENT
Start: 2021-12-09 | End: 2022-03-18 | Stop reason: SDUPTHER

## 2021-12-09 RX ORDER — BUPROPION HYDROCHLORIDE 100 MG/1
100 TABLET ORAL DAILY
Qty: 90 TABLET | Refills: 0 | Status: SHIPPED | OUTPATIENT
Start: 2021-12-09 | End: 2022-03-18 | Stop reason: SDUPTHER

## 2021-12-09 RX ORDER — BUSPIRONE HYDROCHLORIDE 15 MG/1
15 TABLET ORAL 2 TIMES DAILY
Qty: 180 TABLET | Refills: 0 | Status: SHIPPED | OUTPATIENT
Start: 2021-12-09 | End: 2022-03-18 | Stop reason: SDUPTHER

## 2021-12-09 RX ORDER — CLONAZEPAM 1 MG/1
TABLET ORAL
Qty: 30 TABLET | Refills: 2 | Status: SHIPPED | OUTPATIENT
Start: 2021-12-09 | End: 2022-03-18 | Stop reason: SDUPTHER

## 2021-12-09 RX ORDER — TRAZODONE HYDROCHLORIDE 50 MG/1
TABLET ORAL
Qty: 90 TABLET | Refills: 0 | Status: SHIPPED | OUTPATIENT
Start: 2021-12-09 | End: 2022-03-18 | Stop reason: SDUPTHER

## 2022-01-03 ENCOUNTER — SOCIAL WORK (OUTPATIENT)
Dept: BEHAVIORAL/MENTAL HEALTH CLINIC | Facility: CLINIC | Age: 57
End: 2022-01-03
Payer: MEDICARE

## 2022-01-03 DIAGNOSIS — F31.62 BIPOLAR DISORDER, CURRENT EPISODE MIXED, MODERATE (HCC): ICD-10-CM

## 2022-01-03 DIAGNOSIS — F41.1 GENERALIZED ANXIETY DISORDER: Primary | ICD-10-CM

## 2022-01-03 PROCEDURE — 90834 PSYTX W PT 45 MINUTES: CPT | Performed by: SOCIAL WORKER

## 2022-01-03 NOTE — PSYCH
Psychotherapy Provided: Individual Psychotherapy 45 minutes     Length of time in session: 45 minutes, follow up in 2 week    Bipolar, anxiety    Goals addressed in session: Goal 1 and Goal 2     Pain:      moderate to severe    0    Current suicide risk : Low     Data:[place] arrived for his session  He discussed how things are going  He discussed how he is managing his depression and his anger  Assessment: Mitch Melgar denies suicidal/homicidal ideation, plan or intent  He claims he is very annoyed and irritated with his girlfriends father that they live with  We discussed mindfulness, and CBT  Plan: Continue to skill build in distress tolerance  Behavioral Health Treatment Plan [facility]: Diagnosis and Treatment Plan explained to Kae Craft relates understanding diagnosis and is agreeable to Treatment Plan   Yes

## 2022-01-10 ENCOUNTER — TELEMEDICINE (OUTPATIENT)
Dept: INTERNAL MEDICINE CLINIC | Facility: CLINIC | Age: 57
End: 2022-01-10

## 2022-01-10 DIAGNOSIS — J06.9 VIRAL URI WITH COUGH: Primary | ICD-10-CM

## 2022-01-10 PROCEDURE — G2025 DIS SITE TELE SVCS RHC/FQHC: HCPCS | Performed by: INTERNAL MEDICINE

## 2022-01-10 RX ORDER — FLUTICASONE PROPIONATE 50 MCG
1 SPRAY, SUSPENSION (ML) NASAL DAILY
Qty: 18.2 ML | Refills: 1 | Status: SHIPPED | OUTPATIENT
Start: 2022-01-10

## 2022-01-10 NOTE — PROGRESS NOTES
COVID-19 Outpatient Progress Note    Assessment/Plan:    Problem List Items Addressed This Visit     None      Visit Diagnoses     Viral URI with cough    -  Primary    Relevant Medications    fluticasone (FLONASE) 50 mcg/act nasal spray    sodium chloride (OCEAN) 0 65 % nasal spray         Disposition:     Patient is fully vaccinated and I recommended self quarantine for 5 days followed by strict mask use for an additional 5 days  If patient were to develop symptoms, they should immediately self isolate and call our office for further guidance  After clarifying the patient's history, my suspicion for COVID-19 infection is very low  Patient is fully vaccinated but overdue for booster against COVID-19 infection  His signs and symptoms are not concerning for COVID-19 infection at this time  He has no direct contact with individuals known positive for COVID-19 infection  I am recommending symptomatic control this time  Patient is to continue with his over-the-counter Mucinex DM as this is positively reducing his cough  I will send Flonase and nasal saline for his nasal congestion  He was asked to monitor his symptoms closely at home while on these therapies  If he develops fever or worsening shortness of breath, he is to call our office to discuss this or report to his nearest urgent care or emergency department for prompt in-person evaluation, whichever is more convenient and expedient  Patient expressed understanding and agrees to this plan  I have spent 30 minutes directly with the patient  Greater than 50% of this time was spent in counseling/coordination of care regarding: diagnostic results, prognosis, risks and benefits of treatment options, instructions for management, patient and family education, importance of treatment compliance, risk factor reductions and impressions        Encounter provider Daisy Vanessa DO    Provider located at [place] [address] [address] [id number]  [phone]    Recent Visits  No visits were found meeting these conditions  Showing recent visits within past 7 days and meeting all other requirements  Today's Visits  Date Type Provider Dept   01/10/22 Telemedicine [facility] today's visits and meeting all other requirements  Future Appointments  No visits were found meeting these conditions  Showing future appointments within next 150 days and meeting all other requirements     This virtual check-in was done via telephone and he agrees to proceed  Patient agrees to participate in a virtual check in via telephone or video visit instead of presenting to the office to address urgent/immediate medical needs  Patient is aware this is a billable service  After connecting through Telephone, the patient was identified by name and date of birth  Zaheer Frank was informed that this was a telemedicine visit and that the exam was being conducted confidentially over secure lines  My office door was closed  No one else was in the room  Zaheer Frank acknowledged consent and understanding of privacy and security of the telemedicine visit  I informed the patient that I have reviewed his record in Epic and presented the opportunity for him to ask any questions regarding the visit today  The patient agreed to participate  Verification of patient location:  Patient is located in the following state in which I hold an active license: [place]    Subjective:   Zaheer Frank is a 55 y o  male who is concerned about COVID-19  Patient's symptoms include fatigue, nasal congestion, loss of taste, cough and myalgias  Patient denies fever, chills, malaise, rhinorrhea, sore throat, anosmia, shortness of breath, chest tightness, abdominal pain, nausea, vomiting, diarrhea and headaches       - Date of symptom onset: 1/6/2022      COVID-19 vaccination status: Fully vaccinated (primary series)    Exposure:   Contact with a person who is under investigation (PUI) for or who is positive for COVID-19 within the last 14 days?: No    Hospitalized recently for fever and/or lower respiratory symptoms?: No      Currently a healthcare worker that is involved in direct patient care?: No      Works in a special setting where the risk of COVID-19 transmission may be high? (this may include long-term care, correctional and penitentiary facilities; homeless shelters; assisted-living facilities and group homes ): No      Resident in a special setting where the risk of COVID-19 transmission may be high? (this may include long-term care, correctional and penitentiary facilities; homeless shelters; assisted-living facilities and group homes ): No      Patient presents via telephone encounter for upper respiratory symptoms  He is fully vaccinated against COVID-19 but overdue for booster series  He reports nasal congestion, dry cough, muscle aches (pain in his arms), dysgeusia, and fatigue for 4 days  He states his fiancee has similar symptoms  He denies any direct sick contacts or contact with individuals known positive for the COVID-19 infection but does state his grandchildren's stepmother recently tested positive for COVID-19  He states he is taking over-the-counter zinc and Mucinex DM extended release from last few days with some improvement in his cough  He has not trialed any antipyretics are NSAIDs  He does not have a thermometer at home  His review of systems otherwise negative at this time      Lab Results   Component Value Date    SARSCOV2 Positive (A) 04/28/2021     Past Medical History:   Diagnosis Date    ADHD, adult residual type     Anxiety     Anxiety     Bipolar 1 disorder (HCC)     Cataract     Left eye    Depression     Depression     Diabetes mellitus (HCC)     blood sugar 167 on admission    Equinus deformity of foot     GERD (gastroesophageal reflux disease)     Homicidal ideations     Hyperlipidemia     Hypertension     Microalbuminuria     Morbid obesity (HCC)     Neuropathy     Shortness of breath     Sleep apnea     CPAP at bedtime    Sleep apnea     Stroke ([facility] [address] )     Suicidal intent      Past Surgical History:   Procedure Laterality Date    CATARACT EXTRACTION      CATARACT EXTRACTION      HAND SURGERY Right     OTHER SURGICAL HISTORY      REPAIR OF SUPERFICIAL WOUND FACE    [place] ESOPHAGOGASTRODUODENOSCOPY TRANSORAL DIAGNOSTIC N/A 6/14/2018    Procedure: ESOPHAGOGASTRODUODENOSCOPY (EGD); Surgeon: Joya Arguello MD;  Location: [facility]; Service: Gastroenterology    [place] ESOPHAGOGASTRODUODENOSCOPY TRANSORAL DIAGNOSTIC N/A 9/12/2018    Procedure: EGD AND COLONOSCOPY;  Surgeon: Joya Arguello MD;  Location: [facility];   Service: Gastroenterology     Current Outpatient Medications   Medication Sig Dispense Refill    amLODIPine (NORVASC) 5 mg tablet Take 1 tablet (5 mg total) by mouth daily 90 tablet 1    ammonium lactate (LAC-HYDRIN) 12 % cream Apply topically as needed for dry skin 385 g 0    ascorbic acid (VITAMIN C) 250 mg tablet Take 1 tablet (250 mg total) by mouth daily (Patient not taking: Reported on 11/16/2021 ) 90 tablet 3    atorvastatin (LIPITOR) 10 mg tablet Take 1 tablet (10 mg total) by mouth daily 90 tablet 1    benztropine (COGENTIN) 2 mg tablet Take 1 tablet (2 mg total) by mouth daily at bedtime 90 tablet 0    Blood Glucose Monitoring Suppl (FREESTYLE LITE) ANTONIA by Does not apply route 3 (three) times a day 1 each 0    buPROPion (WELLBUTRIN) 100 mg tablet Take 1 tablet (100 mg total) by mouth daily 90 tablet 0    busPIRone (BUSPAR) 15 mg tablet Take 1 tablet (15 mg total) by mouth 2 (two) times a day 180 tablet 0    clonazePAM (KlonoPIN) 1 mg tablet Take 1 tab po qd prn anxiety or panic attacks 30 tablet 2    Dulaglutide (Trulicity) 1 5 BY/5 0AX SOPN Inject once weekly 6 mL 0    ferrous sulfate (EQL Iron Supplement Therapy) 325 (65 Fe) mg tablet Take 1 tablet (325 mg total) by mouth daily with breakfast (Patient not taking: Reported on 11/16/2021 ) 90 tablet 2    fluticasone (FLONASE) 50 mcg/act nasal spray 1 spray into each nostril daily 18 2 mL 1    FREESTYLE LITE test strip CHECK BLOOD SUGARS FOUR TIMES DAILY 400 strip 3    gabapentin (NEURONTIN) 300 mg capsule Take 1 capsule (300mg) by mouth twice daily  180 capsule 1    HumaLOG KwikPen 100 units/mL injection pen INJECT 28 UNITS THREE TIMES DAILY WITH MEALS PLUS SCALE UP  UNITS DAILY 105 mL 0    Insulin Pen Needle (B-D ULTRAFINE III SHORT PEN) 31G X 8 MM MISC by Does not apply route      INSULIN SYRINGE  5CC/29G 29G X 1/2" 0 5 ML MISC by Does not apply route      Lancets (FREESTYLE) lancets USE THREE TIMES DAILY AS DIRECTED 300 each 0    Lantus SoloStar 100 units/mL injection pen ADMINISTER 50 UNITS UNDER THE SKIN EVERY 12 HOURS 15 mL 0    lisinopril-hydrochlorothiazide (PRINZIDE,ZESTORETIC) 20-25 MG per tablet Take 1 tablet by mouth daily 90 tablet 1    lurasidone (Latuda) 120 mg tablet Take 1 tablet (120 mg total) by mouth daily with dinner 90 tablet 0    omeprazole (PriLOSEC) 20 mg delayed release capsule Take 1 capsule (20 mg total) by mouth daily 90 capsule 1    sodium chloride (OCEAN) 0 65 % nasal spray 1 spray into each nostril daily at bedtime 104 mL 2    traZODone (DESYREL) 50 mg tablet Take 1/2 to 1 tab po qhs prn insomnia 90 tablet 0     No current facility-administered medications for this visit  Allergies   Allergen Reactions    Pollen Extract Nasal Congestion    Tetanus Toxoid Swelling       Review of Systems   Constitutional: Positive for fatigue  Negative for chills and fever  HENT: Positive for congestion  Negative for rhinorrhea and sore throat  Respiratory: Positive for cough  Negative for chest tightness and shortness of breath  Gastrointestinal: Negative for abdominal pain, diarrhea, nausea and vomiting     Musculoskeletal: Positive for myalgias  Neurological: Negative for headaches  Objective: There were no vitals filed for this visit  No physical exam was able to be obtained as this was a telephone only encounter  VIRTUAL VISIT DISCLAIMER    Chelo Bustamante verbally agrees to participate in [employer]  Pt is aware that [employer] could be limited without vital signs or the ability to perform a full hands-on physical exam  Tommie Taylor understands he or the provider may request at any time to terminate the video visit and request the patient to seek care or treatment in person

## 2022-01-13 ENCOUNTER — TELEPHONE (OUTPATIENT)
Dept: INTERNAL MEDICINE CLINIC | Facility: CLINIC | Age: 57
End: 2022-01-13

## 2022-01-13 DIAGNOSIS — R53.83 FATIGUE, UNSPECIFIED TYPE: ICD-10-CM

## 2022-01-13 DIAGNOSIS — R43.2 LOSS OF TASTE: Primary | ICD-10-CM

## 2022-01-13 PROCEDURE — U0005 INFEC AGEN DETEC AMPLI PROBE: HCPCS | Performed by: INTERNAL MEDICINE

## 2022-01-13 PROCEDURE — U0003 INFECTIOUS AGENT DETECTION BY NUCLEIC ACID (DNA OR RNA); SEVERE ACUTE RESPIRATORY SYNDROME CORONAVIRUS 2 (SARS-COV-2) (CORONAVIRUS DISEASE [COVID-19]), AMPLIFIED PROBE TECHNIQUE, MAKING USE OF HIGH THROUGHPUT TECHNOLOGIES AS DESCRIBED BY CMS-2020-01-R: HCPCS | Performed by: INTERNAL MEDICINE

## 2022-01-13 NOTE — TELEPHONE ENCOUNTER
Patient's symptoms include fatigue, nasal congestion, loss of taste, cough and myalgias  Symptoms started 1/6/22  Patient was seen on 1/10/22  Patient's girlfriend Jenaro Spring stated their grandson tested covid positive recently and would like patient to get tested for COVID  Clemente Machuca has an order to get tested and would like to be able to take him with her  Marie also stated his medications never went through  Upon review scripts read as PROHIBITED   Marie indicated aside from nasal spray they were suppose to also be sent a script for cough medicine

## 2022-01-13 NOTE — PROGRESS NOTES
Enoch Lang 3 minutes ago (12:18 PM)     [place]       Patient's symptoms include fatigue, nasal congestion, loss of taste, cough and myalgias  Symptoms started 1/6/22  Patient was seen on 1/10/22  Patient's girlfriend Pricila Vizcaino stated their grandson tested covid positive recently and would like patient to get tested for COVID  Magalie Moore has an order to get tested and would like to be able to take him with her  Marie also stated his medications never went through  Upon review scripts read as PROHIBITED   Marie indicated aside from nasal spray they were suppose to also be sent a script for cough medicine        Lab order placed

## 2022-01-13 NOTE — TELEPHONE ENCOUNTER
Clinical -Patient made aware of order placed for covid test  Patient would like to know if the medication will be resent?&  Will the medication for cough included?

## 2022-01-14 DIAGNOSIS — J06.9 VIRAL URI WITH COUGH: Primary | ICD-10-CM

## 2022-01-14 RX ORDER — GUAIFENESIN/DEXTROMETHORPHAN 100-10MG/5
5 SYRUP ORAL 4 TIMES DAILY PRN
Qty: 473 ML | Refills: 1 | Status: SHIPPED | OUTPATIENT
Start: 2022-01-14

## 2022-01-14 NOTE — TELEPHONE ENCOUNTER
Dr Yo Adams- I see that medication was sent to the pharmacy for the patient on 1/10  Are you able to send something for his cough as well  Please advise

## 2022-01-18 ENCOUNTER — TELEPHONE (OUTPATIENT)
Dept: INTERNAL MEDICINE CLINIC | Facility: CLINIC | Age: 57
End: 2022-01-18

## 2022-01-18 DIAGNOSIS — E11.42 DIABETIC POLYNEUROPATHY ASSOCIATED WITH TYPE 2 DIABETES MELLITUS (HCC): Primary | ICD-10-CM

## 2022-01-18 RX ORDER — BLOOD-GLUCOSE METER
1 KIT MISCELLANEOUS 4 TIMES DAILY
Qty: 1 EACH | Refills: 0 | Status: SHIPPED | OUTPATIENT
Start: 2022-01-18

## 2022-01-18 NOTE — TELEPHONE ENCOUNTER
Patient called in stating his glucose meter just broke  Patient is requesting a new meter to be sent to [facility] pharmacy  Patient would like a Freestyle glucose meter to match his testing supplies

## 2022-01-24 ENCOUNTER — CLINICAL SUPPORT (OUTPATIENT)
Dept: INTERNAL MEDICINE CLINIC | Facility: CLINIC | Age: 57
End: 2022-01-24

## 2022-01-24 DIAGNOSIS — Z23 NEED FOR INFLUENZA VACCINATION: Primary | ICD-10-CM

## 2022-01-24 PROCEDURE — 90682 RIV4 VACC RECOMBINANT DNA IM: CPT | Performed by: HOSPITALIST

## 2022-01-24 PROCEDURE — G0008 ADMIN INFLUENZA VIRUS VAC: HCPCS | Performed by: HOSPITALIST

## 2022-02-02 ENCOUNTER — SOCIAL WORK (OUTPATIENT)
Dept: BEHAVIORAL/MENTAL HEALTH CLINIC | Facility: CLINIC | Age: 57
End: 2022-02-02
Payer: MEDICARE

## 2022-02-02 DIAGNOSIS — F41.1 GENERALIZED ANXIETY DISORDER: Primary | ICD-10-CM

## 2022-02-02 DIAGNOSIS — F31.62 BIPOLAR DISORDER, CURRENT EPISODE MIXED, MODERATE (HCC): ICD-10-CM

## 2022-02-02 PROCEDURE — 90834 PSYTX W PT 45 MINUTES: CPT | Performed by: SOCIAL WORKER

## 2022-02-02 NOTE — PSYCH
Psychotherapy Provided: Individual Psychotherapy 45 minutes     Length of time in session: 45 minutes, follow up in 2 week    Depression, anxiety  Goals addressed in session: Goal 1 and Goal 2     Pain:      moderate to severe    0    Current suicide risk : Low     Data: Ion Kapoor shared he is not depressed  However he admits to anxiety  About a month ago his 15year old grand niece is starting to steal money, sodas and other things  She then lies about it  They have her in therapy for years at [facility] and they are aware of this  Ion Kapoor feels she never dealt with the death of her mom and now she is starting to show attitude  Ion Kapoor discussed the natural logical consequences they put on her  Assessment: Ion Kapoor overall is doing good  However he has anxiety over what his niece is doing concerning stealing  She has ADHD  She also has some depression  I provided mindfulness, CBT and solution focused therapy strategies  Ion Kapoor also discussed how his father in law lost several thousand dollars thru an elderly scam  Plan: Continue to help jignesh manage his anxiety and skill build in distress tolerance  Behavioral Health Treatment Plan [facility]: Diagnosis and Treatment Plan explained to Braden Gregorio relates understanding diagnosis and is agreeable to Treatment Plan   Yes

## 2022-02-02 NOTE — BH TREATMENT PLAN
Alicia Diaz  1965       Date of Initial Treatment Plan: 05/07/2018   Date of Current Treatment Plan: 02/02/22    Treatment Plan Number update    Strengths/Personal Resources for Self Care: kind, caring, passionate for life    Diagnosis:   1  Generalized anxiety disorder     2  Bipolar disorder, current episode mixed, moderate ([facility] [address] )         Area of Needs: please see below      Long Term Goal 1: I need to better manage my depression    Target Date: 07/28/2022  Completion Date: TBD         Short Term Objectives for Goal 1: Mindfulness, CBT and Solution focused therapy    Long Term Goal 2: I need to manage my anxiety    Target Date: 07/28/2022  Completion Date: TBD    Short Term Objectives for Goal 2: stress management and meditation         Long Term Goal # 3: I need to learn to cope with my niece and a difficult father in law     Target Date: 07/28/2022  Completion Date: TBD    Short Term Objectives for Goal 3: talk about coping strategies in therapy  GOAL 1: Modality: Individual 2x per month   Completion Date tbd    GOAL 2: Modality: Individual 2x per month   Completion Date tbd     GOAL 3: Modality: Individual 2x per month   Completion Date tbd      Behavioral Health Treatment Plan [facility]: Diagnosis and Treatment Plan explained to Héctor Carpenter relates understanding diagnosis and is agreeable to Treatment Plan  Client Comments : Please share your thoughts, feelings, need and/or experiences regarding your treatment plan: Howard Milian and the undersigned will work as a team to help him progress on his goals

## 2022-02-04 ENCOUNTER — TELEPHONE (OUTPATIENT)
Dept: INTERNAL MEDICINE CLINIC | Facility: CLINIC | Age: 57
End: 2022-02-04

## 2022-02-04 NOTE — TELEPHONE ENCOUNTER
Pt is scheduled to see Scotty Madera on 28/22 for a  Follow Up) appt  Can you please put in an order?     Dx- diabetes

## 2022-02-08 ENCOUNTER — OFFICE VISIT (OUTPATIENT)
Dept: MULTI SPECIALTY CLINIC | Facility: CLINIC | Age: 57
End: 2022-02-08

## 2022-02-08 VITALS
DIASTOLIC BLOOD PRESSURE: 81 MMHG | SYSTOLIC BLOOD PRESSURE: 140 MMHG | BODY MASS INDEX: 52.62 KG/M2 | HEART RATE: 93 BPM | WEIGHT: 315 LBS

## 2022-02-08 DIAGNOSIS — I12.9 BENIGN HYPERTENSION WITH CKD (CHRONIC KIDNEY DISEASE) STAGE III (HCC): ICD-10-CM

## 2022-02-08 DIAGNOSIS — E11.65 TYPE 2 DIABETES MELLITUS WITH HYPERGLYCEMIA, WITH LONG-TERM CURRENT USE OF INSULIN (HCC): Primary | ICD-10-CM

## 2022-02-08 DIAGNOSIS — N18.30 BENIGN HYPERTENSION WITH CKD (CHRONIC KIDNEY DISEASE) STAGE III (HCC): ICD-10-CM

## 2022-02-08 DIAGNOSIS — E78.2 MIXED HYPERLIPIDEMIA: ICD-10-CM

## 2022-02-08 DIAGNOSIS — Z79.4 TYPE 2 DIABETES MELLITUS WITH HYPERGLYCEMIA, WITH LONG-TERM CURRENT USE OF INSULIN (HCC): Primary | ICD-10-CM

## 2022-02-08 PROCEDURE — 99214 OFFICE O/P EST MOD 30 MIN: CPT | Performed by: PHYSICIAN ASSISTANT

## 2022-02-08 RX ORDER — BLOOD-GLUCOSE METER
KIT MISCELLANEOUS
Qty: 1 KIT | Refills: 0 | Status: SHIPPED | OUTPATIENT
Start: 2022-02-08

## 2022-02-08 RX ORDER — FLASH GLUCOSE SCANNING READER
EACH MISCELLANEOUS
Qty: 1 EACH | Refills: 0 | Status: SHIPPED | OUTPATIENT
Start: 2022-02-08

## 2022-02-08 RX ORDER — FLASH GLUCOSE SENSOR
KIT MISCELLANEOUS
Qty: 2 EACH | Refills: 5 | Status: SHIPPED | OUTPATIENT
Start: 2022-02-08

## 2022-02-08 NOTE — PROGRESS NOTES
Patient Progress Note      CC: DM      Referring Provider  Reza Muhammad Md  [facility],  [address] [place] [address]     History of Present Illness:   Jere Landa is a 55 y o  male with a history of type 2 diabetes with long term use of insulin  Diabetes course has been stable  Complications of DM: CKD, neuropathy  Denies recent illness or hospitalizations  Denies any issues with his current regimen  Home glucose monitoring: are performed once a day     No log or meter today   He reports BG levels range from 150-200 mg/dl  Highest reading recently was 275 mg/dl  Current regimen: Lantus 50 units BID, Humalog 28 units TID with meals, Trulicity 1 5 mg weekly  compliant most of the time, denies any side effects from medications  Injects in: abdomen  Rotates sites: Yes  Hypoglycemic episodes: No, rare  H/o of hypoglycemia causing hospitalization or Intervention such as glucagon injection or ambulance call : Yes  Hypoglycemia symptoms: jitteriness  Treatment of hypoglycemia: orange juice     Medic alert tag: recommended: Yes     Diabetes education: None   Diet: 2 meals per day, 0-1 snacks per day  Timing of meals is predictable  Diabetic diet compliance: noncompliant much of the time  He does still continue to drink sweet tea  Activity: Daily activity is predictable: Yes  No routine exercise  Ophthamology: eye exam UTD, August 2021  Podiatry: foot exam UTD, August 2021     Has hypertension: on ACE inhibitor/ARB, compliant most of the time  Has hyperlipidemia: on statin - tolerating well, no myalgias  compliant most of the time, denies any side effects from medications    Thyroid disorders: No  History of pancreatitis: No    Patient Active Problem List   Diagnosis    Bipolar affective disorder, current episode mixed ([facility]    Panic attacks    Morbid obesity with BMI of 50 0-59 9, adult ([facility] )    Flat foot    [facility]    Benign hypertension with CKD (chronic kidney disease) stage III ([facility] [address] )    Allergic rhinitis    GERD (gastroesophageal reflux disease)    Insomnia    Hiatal hernia    Lower esophageal ring ([place])    Generalized anxiety disorder    DAISY (obstructive sleep apnea)    Stage 3 chronic kidney disease ([facility]    Type 2 diabetes mellitus with hyperglycemia, with long-term current use of insulin ([facility])    Persistent proteinuria    Anemia, unspecified    Toenail deformity    Hyperlipidemia    Extrapyramidal symptom    Vitamin D deficiency      Past Medical History:   Diagnosis Date    ADHD, adult residual type     Anxiety     Anxiety     Bipolar 1 disorder ([facility])     Cataract     Left eye    Depression     Depression     Diabetes mellitus ([facility])     blood sugar 167 on admission    Equinus deformity of foot     GERD (gastroesophageal reflux disease)     Homicidal ideations     Hyperlipidemia     Hypertension     Microalbuminuria     Morbid obesity ([facility])     Neuropathy     Shortness of breath     Sleep apnea     CPAP at bedtime    Sleep apnea     [facility]     Suicidal intent       Past Surgical History:   Procedure Laterality Date    CATARACT EXTRACTION      CATARACT EXTRACTION      HAND SURGERY Right     OTHER SURGICAL HISTORY      REPAIR OF SUPERFICIAL WOUND FACE    MT ESOPHAGOGASTRODUODENOSCOPY TRANSORAL DIAGNOSTIC N/A 6/14/2018    Procedure: ESOPHAGOGASTRODUODENOSCOPY (EGD); Surgeon: Britt Benedict MD;  [facility]; Service: Gastroenterology    MT ESOPHAGOGASTRODUODENOSCOPY TRANSORAL DIAGNOSTIC N/A 9/12/2018    Procedure: EGD AND COLONOSCOPY;  Surgeon: Britt Benedict MD;  [facility];   Service: Gastroenterology      Family History   Problem Relation Age of Onset    Diabetes Mother     Alcohol abuse Father     Diabetes Father     Hypertension Father     Lung cancer Father     Stroke Father     Cancer Father         lung    Alcohol abuse Sister     Depression Sister     Lymphoma Sister     Alcohol abuse Family     Alcohol abuse Sister     Alcohol abuse Sister     Cancer Sister     Heart disease Neg Hx     Thyroid disease Neg Hx      Social History     Tobacco Use    Smoking status: Former Smoker     Types: Cigarettes     Quit date:      Years since quittin 1    Smokeless tobacco: Former User     Types: Chew    Tobacco comment: pt "Quit after approx over 25 years ago"   Substance Use Topics    Alcohol use: Yes     Comment: rarely     Allergies   Allergen Reactions    Pollen Extract Nasal Congestion    Tetanus Toxoid Swelling         Current Outpatient Medications:     amLODIPine (NORVASC) 5 mg tablet, Take 1 tablet (5 mg total) by mouth daily, Disp: 90 tablet, Rfl: 1    ammonium lactate (LAC-HYDRIN) 12 % cream, Apply topically as needed for dry skin, Disp: 385 g, Rfl: 0    ascorbic acid (VITAMIN C) 250 mg tablet, Take 1 tablet (250 mg total) by mouth daily (Patient not taking: Reported on 2021 ), Disp: 90 tablet, Rfl: 3    atorvastatin (LIPITOR) 10 mg tablet, Take 1 tablet (10 mg total) by mouth daily, Disp: 90 tablet, Rfl: 1    benztropine (COGENTIN) 2 mg tablet, Take 1 tablet (2 mg total) by mouth daily at bedtime, Disp: 90 tablet, Rfl: 0    Blood Glucose Monitoring Suppl (FREESTYLE LITE) ANTONIA, by Does not apply route 3 (three) times a day, Disp: 1 each, Rfl: 0    buPROPion (WELLBUTRIN) 100 mg tablet, Take 1 tablet (100 mg total) by mouth daily, Disp: 90 tablet, Rfl: 0    busPIRone (BUSPAR) 15 mg tablet, Take 1 tablet (15 mg total) by mouth 2 (two) times a day, Disp: 180 tablet, Rfl: 0    clonazePAM (KlonoPIN) 1 mg tablet, Take 1 tab po qd prn anxiety or panic attacks, Disp: 30 tablet, Rfl: 2    dextromethorphan-guaiFENesin (ROBITUSSIN DM)  mg/5 mL syrup, Take 5 mL by mouth 4 (four) times a day as needed for cough or congestion, Disp: 473 mL, Rfl: 1    Dulaglutide (Trulicity) 1 5 NQ/5 3MZ SOPN, Inject once weekly, Disp: 6 mL, Rfl: 0    ferrous sulfate (EQL Iron Supplement Therapy) 325 (65 Fe) mg tablet, Take 1 tablet (325 mg total) by mouth daily with breakfast (Patient not taking: Reported on 11/16/2021 ), Disp: 90 tablet, Rfl: 2    fluticasone (FLONASE) 50 mcg/act nasal spray, 1 spray into each nostril daily, Disp: 18 2 mL, Rfl: 1    FREESTYLE LITE test strip, CHECK BLOOD SUGARS FOUR TIMES DAILY, Disp: 400 strip, Rfl: 3    gabapentin (NEURONTIN) 300 mg capsule, Take 1 capsule (300mg) by mouth twice daily  , Disp: 180 capsule, Rfl: 1    glucose monitoring kit (FREESTYLE) monitoring kit, Use 1 each 4 (four) times a day Fine to substitute other brand if not covered by insurance, Disp: 1 each, Rfl: 0    HumaLOG KwikPen 100 units/mL injection pen, INJECT 28 UNITS THREE TIMES DAILY WITH MEALS PLUS SCALE UP  UNITS DAILY, Disp: 105 mL, Rfl: 0    Insulin Pen Needle (B-D ULTRAFINE III SHORT PEN) 31G X 8 MM MISC, by Does not apply route, Disp: , Rfl:     INSULIN SYRINGE  5CC/29G 29G X 1/2" 0 5 ML MISC, by Does not apply route, Disp: , Rfl:     Lancets (FREESTYLE) lancets, USE THREE TIMES DAILY AS DIRECTED, Disp: 300 each, Rfl: 0    Lantus SoloStar 100 units/mL injection pen, ADMINISTER 50 UNITS UNDER THE SKIN EVERY 12 HOURS, Disp: 15 mL, Rfl: 0    lisinopril-hydrochlorothiazide (PRINZIDE,ZESTORETIC) 20-25 MG per tablet, Take 1 tablet by mouth daily, Disp: 90 tablet, Rfl: 1    lurasidone (Latuda) 120 mg tablet, Take 1 tablet (120 mg total) by mouth daily with dinner, Disp: 90 tablet, Rfl: 0    omeprazole (PriLOSEC) 20 mg delayed release capsule, Take 1 capsule (20 mg total) by mouth daily, Disp: 90 capsule, Rfl: 1    sodium chloride (OCEAN) 0 65 % nasal spray, 1 spray into each nostril daily at bedtime, Disp: 104 mL, Rfl: 2    traZODone (DESYREL) 50 mg tablet, Take 1/2 to 1 tab po qhs prn insomnia, Disp: 90 tablet, Rfl: 0  Review of Systems   Constitutional: Negative for activity change, appetite change, fatigue and unexpected weight change  HENT: Negative for trouble swallowing  Eyes: Positive for visual disturbance  Respiratory: Negative for shortness of breath  Cardiovascular: Negative for chest pain and palpitations  Gastrointestinal: Positive for constipation  Negative for diarrhea  Endocrine: Negative for polydipsia and polyuria  Musculoskeletal: Negative  Skin: Negative for wound  Neurological: Positive for numbness  Psychiatric/Behavioral: Negative  Physical Exam:  Body mass index is 52 62 kg/m²  /81 (BP Location: Left arm, Patient Position: Sitting, Cuff Size: Large)   Pulse 93   Wt (!) 148 kg (326 lb)   BMI 52 62 kg/m²    Wt Readings from Last 3 Encounters:   02/08/22 (!) 148 kg (326 lb)   11/22/21 (!) 153 kg (337 lb)   11/16/21 (!) 152 kg (335 lb)       Physical Exam  Vitals and nursing note reviewed  Constitutional:       Appearance: He is well-developed  HENT:      Head: Normocephalic  Eyes:      General: No scleral icterus  Pupils: Pupils are equal, round, and reactive to light  Neck:      Thyroid: No thyromegaly  Cardiovascular:      Rate and Rhythm: Normal rate and regular rhythm  Pulses:           Radial pulses are 2+ on the right side and 2+ on the left side  Heart sounds: No murmur heard  Pulmonary:      Effort: Pulmonary effort is normal  No respiratory distress  Breath sounds: Normal breath sounds  No wheezing  Musculoskeletal:      Cervical back: Neck supple  Skin:     General: Skin is warm and dry  Neurological:      Mental Status: He is alert  Patient's shoes and socks were not removed            Labs:   Component      Latest Ref Rng & Units 3/11/2021 11/9/2021   Sodium      136 - 145 mmol/L 136 134 (L)   Potassium      3 5 - 5 3 mmol/L 4 1 4 5   Chloride      100 - 108 mmol/L 103 103   CO2      21 - 32 mmol/L 27 25   Anion Gap      4 - 13 mmol/L 6 6   BUN      5 - 25 mg/dL 15 18   Creatinine      0 60 - 1 30 mg/dL 1 78 (H) 1 95 (H)   GLUCOSE FASTING      65 - 99 mg/dL 85 121 (H) Calcium      8 3 - 10 1 mg/dL 9 1 8 8   CORRECTED CALCIUM      8 3 - 10 1 mg/dL 10 2 (H) 9 7   AST      5 - 45 U/L 11    ALT      12 - 78 U/L 18    Alkaline Phosphatase      46 - 116 U/L 89    Total Protein      6 4 - 8 2 g/dL 7 0    Albumin      3 5 - 5 0 g/dL 2 6 (L) 2 9 (L)   TOTAL BILIRUBIN      0 20 - 1 00 mg/dL 0 25    eGFR      ml/min/1 73sq m 49 43   Phosphorus      2 7 - 4 5 mg/dL  3 7   Cholesterol      50 - 200 mg/dL 87    Triglycerides      <=150 mg/dL 53    HDL      >=40 mg/dL 39 (L)    LDL Calculated      0 - 100 mg/dL 37    Hemoglobin A1C      Normal 3 8-5 6%; PreDiabetic 5 7-6 4%; Diabetic >=6 5%; Glycemic control for adults with diabetes <7 0% % 8 6 (H) 7 2 (H)   eAG, EST AVG Glucose      mg/dl 200 160   Vit D, 25-Hydroxy      30 0 - 100 0 ng/mL  30 8     Plan:    Yanely Frazier was seen today for follow-up  Diagnoses and all orders for this visit:    Type 2 diabetes mellitus with hyperglycemia, with long-term current use of insulin ([address] )  HGA1C 7 2%  Improved  Due now  Treatment regimen: unable to make changes as no recent A1C and patient did not bring log  Advised to complete labs and send log  For now continue current treatment  Discussed intensive insulin regimen does increase risk for hypoglycemia  Episodes of hypoglycemia can lead to permanent disability and death  Discussed risks/complications associated with uncontrolled diabetes  Advised to adhere to diabetic diet, and recommended staying active/exercising routinely as tolerated  Keep carbohydrates consistent to limit blood glucose fluctuations  Advised to call if blood sugars less than 70 mg/dl or over 300 mg/dl  Check blood glucose 3+ times a day  Discussed symptoms and treatment of hypoglycemia  Discussed use of CGM to collect additional blood glucose data to reveal trends and patterns that can be used to optimize treatment plan  Referred to diabetes/nutrition education     Recommended routine follow-up with podiatry and ophthalmology  Send log in 1-2 weeks  Ordered blood work to complete prior to next visit  -     Ambulatory Referral to Endocrinology  -     Hemoglobin A1C; Future  -     Basic metabolic panel; Future  -     Hemoglobin A1C; Future  -     Basic metabolic panel; Future    Benign hypertension with CKD (chronic kidney disease) stage III (HCC)  Blood pressure adequately controlled, continue current treatment  -     Basic metabolic panel; Future  -     Basic metabolic panel; Future    Mixed hyperlipidemia  LDL previously 37  Continue statin therapy         Discussed with the patient diagnosis and treatment and all questions fully answered  He will call me if any problems arise  Counseled patient on diagnostic results, prognosis, risk and benefit of treatment options, instruction for management, importance of treatment compliance, risk factor reduction and impressions      Katherine Bo PA-C      [facility] ENDOCRINOLOGY

## 2022-02-08 NOTE — PATIENT INSTRUCTIONS
Hypoglycemia instructions   Sheryl Demi  2/8/2022  [id number]    Low Blood Sugar    Steps to treat low blood sugar  1  Test blood sugar if you have symptoms of low blood sugar:   Low Blood Sugar Symptoms:  o Sweaty  o Dizzy  o Rapid heartbeat  o Shaky  o Bad mood  o Hungry      2  Treat blood sugar less than 70 with 15 grams of fast-acting carbohydrate:   Examples of 15 grams Fast-Acting Carbohydrate:  o 4 oz juice  o 4 oz regular soda  o 3-4 glucose tablets (chew)  o 3-4 hard candies (chew)          3  Wait 15 minutes and test your blood sugar again     4   If blood sugar is less than 100, repeat steps 2-3     5  When your blood sugar is 100 or more, eat a snack if it will be longer than one hour until your next meal  The snack should be 15 grams of carbohydrate and a protein:   Examples of snacks:  o ½ sandwich  o 6 crackers with cheese  o Piece of fruit with cheese or peanut butter  o 6 crackers with peanut butter

## 2022-02-12 DIAGNOSIS — E11.65 TYPE 2 DIABETES MELLITUS WITH HYPERGLYCEMIA, UNSPECIFIED WHETHER LONG TERM INSULIN USE (HCC): ICD-10-CM

## 2022-02-14 RX ORDER — INSULIN GLARGINE 100 [IU]/ML
INJECTION, SOLUTION SUBCUTANEOUS
Qty: 15 ML | Refills: 3 | Status: SHIPPED | OUTPATIENT
Start: 2022-02-14 | End: 2022-05-11 | Stop reason: SDUPTHER

## 2022-02-21 ENCOUNTER — OFFICE VISIT (OUTPATIENT)
Dept: MULTI SPECIALTY CLINIC | Facility: CLINIC | Age: 57
End: 2022-02-21

## 2022-02-21 VITALS
WEIGHT: 315 LBS | HEART RATE: 102 BPM | HEIGHT: 66 IN | DIASTOLIC BLOOD PRESSURE: 79 MMHG | TEMPERATURE: 97 F | SYSTOLIC BLOOD PRESSURE: 114 MMHG | BODY MASS INDEX: 50.62 KG/M2

## 2022-02-21 DIAGNOSIS — I12.9 TYPE 2 DIABETES MELLITUS WITH STAGE 3 CHRONIC KIDNEY DISEASE AND HYPERTENSION (HCC): ICD-10-CM

## 2022-02-21 DIAGNOSIS — B35.1 ONYCHOMYCOSIS: Primary | ICD-10-CM

## 2022-02-21 DIAGNOSIS — N18.30 TYPE 2 DIABETES MELLITUS WITH STAGE 3 CHRONIC KIDNEY DISEASE AND HYPERTENSION (HCC): ICD-10-CM

## 2022-02-21 DIAGNOSIS — E11.22 TYPE 2 DIABETES MELLITUS WITH STAGE 3 CHRONIC KIDNEY DISEASE AND HYPERTENSION (HCC): ICD-10-CM

## 2022-02-21 PROCEDURE — 11721 DEBRIDE NAIL 6 OR MORE: CPT | Performed by: PODIATRIST

## 2022-02-21 PROCEDURE — 99213 OFFICE O/P EST LOW 20 MIN: CPT | Performed by: PODIATRIST

## 2022-02-21 NOTE — PROGRESS NOTES
At [facility] [address] 55 y o  male MRN: [id number]  Encounter: [id number]      Assessment/Plan        Diagnoses and all orders for this visit:    Onychomycosis    Type 2 diabetes mellitus with stage 3 chronic kidney disease and hypertension ([facility] [address] )  -     Ambulatory referral to Podiatry           Plan:   Patient was seen/examined  All questions and concerns addressed   Nails x10 were sharply trimmed to normal length and thickness with a large nail nipper without incident (CPT 81878)   Educated patient on proper diabetic foot care; checking feet every day, wearing white socks, always wearing diabetic shoes even in house, tight glycemic control, proper low-sugar diet and exercise   RTC in 3 months     Dr Chasidy Anton was present during this entire procedure  History of Present Illness     HPI:  Aleida Garcia is a 55 y o  male who presents with elongated toenails and routine diabetic foot exam  States that their nails are painful, elongated  They have difficulty applying their socks and shoes  The pressure within their shoe gear is painful and they have been unable to cut their nails adequately  Patient admits to numbness/tingling  They state their last HbA1c was 7 2%  The patient denies any nausea, vomiting, fever, chills, shortness of breath, or chest pains  Review of Systems   Constitutional: Negative  HENT: Negative  Eyes: Negative  Respiratory: Negative  Cardiovascular: Negative  Gastrointestinal: Negative  Musculoskeletal: Negative   Skin: elongated thickened toenails  Neurological: Negative          Historical Information   Past Medical History:   Diagnosis Date    ADHD, adult residual type     Anxiety     Anxiety     Bipolar 1 disorder (HCC)     Cataract     Left eye    Depression     Depression     Diabetes mellitus (HCC)     blood sugar 167 on admission    Equinus deformity of foot     GERD (gastroesophageal reflux disease)     Homicidal ideations     Hyperlipidemia     Hypertension     Microalbuminuria     Morbid obesity (HCC)     Neuropathy     Shortness of breath     Sleep apnea     CPAP at bedtime    Sleep apnea     Stroke ([facility] [address] [facility]     Suicidal intent      Past Surgical History:   Procedure Laterality Date    CATARACT EXTRACTION      CATARACT EXTRACTION      HAND SURGERY Right     OTHER SURGICAL HISTORY      REPAIR OF SUPERFICIAL WOUND FACE    OR ESOPHAGOGASTRODUODENOSCOPY TRANSORAL DIAGNOSTIC N/A 2018    Procedure: ESOPHAGOGASTRODUODENOSCOPY (EGD); Surgeon: Patricia Ying MD;  [facility]; Service: Gastroenterology    OR ESOPHAGOGASTRODUODENOSCOPY TRANSORAL DIAGNOSTIC N/A 2018    Procedure: EGD AND COLONOSCOPY;  Surgeon: Patricia Ying MD;  [facility];   Service: Gastroenterology     Social History   Social History     Substance and Sexual Activity   Alcohol Use Yes    Comment: rarely     Social History     Substance and Sexual Activity   Drug Use Not Currently    Comment: quit 20 years ago     Social History     Tobacco Use   Smoking Status Former Smoker    Types: Cigarettes    Quit date: 5    Years since quittin 1   Smokeless Tobacco Former User    Types: Chew   Tobacco Comment    pt "Quit after approx over 25 years ago"     Family History:   Family History   Problem Relation Age of Onset    Diabetes Mother     Alcohol abuse Father    Tomie Coop Diabetes Father     Hypertension Father     Lung cancer Father     Stroke Father     Cancer Father         lung    Alcohol abuse Sister     Depression Sister     Lymphoma Sister     Alcohol abuse Family     Alcohol abuse Sister     Alcohol abuse Sister     Cancer Sister     Heart disease Neg Hx     Thyroid disease Neg Hx        Meds/Allergies   (Not in a hospital admission)    Allergies   Allergen Reactions    Pollen Extract Nasal Congestion    Tetanus Toxoid Swelling       Objective     Current Vitals:   Blood Pressure: 114/79 (22 1252)  Pulse: 102 (02/21/22 1252)  Temperature: (!) 97 °F (36 1 °C) (02/21/22 1252)  Temp Source: Temporal (02/21/22 1252)  Height: 5' 6" (167 6 cm) (02/21/22 1252)  Weight - Scale: (!) 146 kg (322 lb) (02/21/22 1252)        /79 (BP Location: Right arm, Patient Position: Sitting, Cuff Size: Large)   Pulse 102   Temp (!) 97 °F (36 1 °C) (Temporal)   Ht 5' 6" (1 676 m)   Wt (!) 146 kg (322 lb)   BMI 51 97 kg/m²       Lower Extremity Exam:    Vascular: Right foot DP/PT +2                   Left foot DP/PT +2                   There is +2 lower extremity edema bilateral ankles and lower legs  Musculoskeletal: There is 5/5 strength throughout the bilateral lower extremity  limited ankle range of motion with well maintained subtalar range of motion  There is no tenderness over the foot and ankle  There is foot deformities: bilateral pes planus    Neurological: Sensation to 5 [address] [place] nylon filament: diminished to sensation  bilaterally      Vibratory sense to distal Foot  intact bilaterally      Sharp/Dull sense is intact bilaterally      Dermatology: Skin Condition:  normal     There is not evidence of macerated tissue between toe spaces  Nail Exam: onychomycosis of the toenails       Open ulcerations: No     Calluses: No    Risk Category: 1 = Loss of protective sensation    Biomechanical Exam of LE:   Ankle dorsiflexion with knee extended is limited and unable to get pass vertical on right and limited and unable to get pass vertical on left  Ankle dorsiflexion with knee flexed is limited and unable to get pass vertical on right and limited and unable to get pass vertical on left  Malleolar positioning is WNL b/l  STJ ROM WNL b/l, heel inversion is approximately 20° on right and 20° on left; heel eversion is approximately 10° on right and 10° on left; neutral calcaneal stance position is 0°   1st ray ROM WNL b/l, able to dorsiflex/plantarflex b/l  Tibial varum is 0° b/l, Resting calcaneal stance position is valgus and approximately -1° on right and valgus and approximately -1° on left  Gait analysis: pronated  Gross deformity noted: bilateral pes planus

## 2022-02-22 ENCOUNTER — TELEPHONE (OUTPATIENT)
Dept: ENDOCRINOLOGY | Facility: CLINIC | Age: 57
End: 2022-02-22

## 2022-02-28 ENCOUNTER — TELEPHONE (OUTPATIENT)
Dept: ENDOCRINOLOGY | Facility: CLINIC | Age: 57
End: 2022-02-28

## 2022-03-02 ENCOUNTER — SOCIAL WORK (OUTPATIENT)
Dept: BEHAVIORAL/MENTAL HEALTH CLINIC | Facility: CLINIC | Age: 57
End: 2022-03-02
Payer: MEDICARE

## 2022-03-02 ENCOUNTER — TELEPHONE (OUTPATIENT)
Dept: PSYCHIATRY | Facility: CLINIC | Age: 57
End: 2022-03-02

## 2022-03-02 DIAGNOSIS — F31.62 BIPOLAR DISORDER, CURRENT EPISODE MIXED, MODERATE (HCC): ICD-10-CM

## 2022-03-02 DIAGNOSIS — F41.1 GENERALIZED ANXIETY DISORDER: Primary | ICD-10-CM

## 2022-03-02 PROCEDURE — 90834 PSYTX W PT 45 MINUTES: CPT | Performed by: SOCIAL WORKER

## 2022-03-02 NOTE — TELEPHONE ENCOUNTER
Pt was coming in for an apt with another provider and stated that he will need to cancel his appt on Friday with corrente, no reason given

## 2022-03-02 NOTE — PSYCH
Psychotherapy Provided: Individual Psychotherapy 45 minutes     Length of time in session: 45 minutes, follow up in 2 week    Bipolar mixed moderate,THERESE    Goals addressed in session: Goal 1, Goal 2 and Goal 3      Pain:      moderate to severe    0    Current suicide risk : Low     Data: Sangeeta arrived for his appointment  He depression and anxiety are elevated over how his father in law acts  He continues to deal with this person and a great niece who can be difficult  Assessment: Bassam Elam denies suicidal/homicidal ideation, plan or intent  However currently his depression and anxiety are elevated due to the conflict in the house  We worked on mindfulness and distress tolerance  Plan: Continue to skill build in distress tolerance  Behavioral Health Treatment Plan [facility]: Diagnosis and Treatment Plan explained to Cecily Brown relates understanding diagnosis and is agreeable to Treatment Plan   Yes

## 2022-03-14 ENCOUNTER — TELEPHONE (OUTPATIENT)
Dept: PSYCHIATRY | Facility: CLINIC | Age: 57
End: 2022-03-14

## 2022-03-14 NOTE — TELEPHONE ENCOUNTER
I contacted Connor Alcala on cell phone # of record and he reports he has enough of all of his medicines to last at least until his next visit on 3/18/2022
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
## 2022-03-18 ENCOUNTER — OFFICE VISIT (OUTPATIENT)
Dept: PSYCHIATRY | Facility: CLINIC | Age: 57
End: 2022-03-18
Payer: MEDICARE

## 2022-03-18 VITALS — SYSTOLIC BLOOD PRESSURE: 124 MMHG | HEART RATE: 98 BPM | DIASTOLIC BLOOD PRESSURE: 77 MMHG

## 2022-03-18 DIAGNOSIS — R29.818 EXTRAPYRAMIDAL SYMPTOM: ICD-10-CM

## 2022-03-18 DIAGNOSIS — F41.1 GENERALIZED ANXIETY DISORDER: Primary | Chronic | ICD-10-CM

## 2022-03-18 DIAGNOSIS — F41.0 PANIC ATTACKS: ICD-10-CM

## 2022-03-18 DIAGNOSIS — G47.00 INSOMNIA, UNSPECIFIED TYPE: ICD-10-CM

## 2022-03-18 DIAGNOSIS — F31.61 BIPOLAR DISORDER, CURRENT EPISODE MIXED, MILD (HCC): ICD-10-CM

## 2022-03-18 PROCEDURE — 99213 OFFICE O/P EST LOW 20 MIN: CPT | Performed by: PHYSICIAN ASSISTANT

## 2022-03-18 RX ORDER — BUSPIRONE HYDROCHLORIDE 15 MG/1
15 TABLET ORAL 2 TIMES DAILY
Qty: 180 TABLET | Refills: 0 | Status: SHIPPED | OUTPATIENT
Start: 2022-03-18 | End: 2022-06-10 | Stop reason: SDUPTHER

## 2022-03-18 RX ORDER — TRAZODONE HYDROCHLORIDE 50 MG/1
TABLET ORAL
Qty: 90 TABLET | Refills: 0 | Status: SHIPPED | OUTPATIENT
Start: 2022-03-18 | End: 2022-06-10 | Stop reason: SDUPTHER

## 2022-03-18 RX ORDER — BUPROPION HYDROCHLORIDE 100 MG/1
100 TABLET ORAL DAILY
Qty: 90 TABLET | Refills: 0 | Status: SHIPPED | OUTPATIENT
Start: 2022-03-18 | End: 2022-06-10 | Stop reason: SDUPTHER

## 2022-03-18 RX ORDER — BENZTROPINE MESYLATE 2 MG/1
2 TABLET ORAL
Qty: 180 TABLET | Refills: 0 | Status: SHIPPED | OUTPATIENT
Start: 2022-03-18 | End: 2022-03-25 | Stop reason: SDUPTHER

## 2022-03-18 RX ORDER — CLONAZEPAM 1 MG/1
TABLET ORAL
Qty: 30 TABLET | Refills: 2 | Status: SHIPPED | OUTPATIENT
Start: 2022-03-18 | End: 2022-06-10 | Stop reason: SDUPTHER

## 2022-03-21 ENCOUNTER — TELEPHONE (OUTPATIENT)
Dept: PSYCHIATRY | Facility: CLINIC | Age: 57
End: 2022-03-21

## 2022-03-21 DIAGNOSIS — R29.818 EXTRAPYRAMIDAL SYMPTOM: ICD-10-CM

## 2022-03-21 NOTE — TELEPHONE ENCOUNTER
[facility] called regarding medication instruction for medication Gabapentin   Requesting for roderick Santillan to give a called back regarding medication dosage

## 2022-03-25 RX ORDER — BENZTROPINE MESYLATE 2 MG/1
TABLET ORAL
Qty: 180 TABLET | Refills: 0 | Status: SHIPPED | OUTPATIENT
Start: 2022-03-25 | End: 2022-03-25 | Stop reason: SDUPTHER

## 2022-03-25 RX ORDER — BENZTROPINE MESYLATE 2 MG/1
TABLET ORAL
Qty: 180 TABLET | Refills: 0 | Status: SHIPPED | OUTPATIENT
Start: 2022-03-25 | End: 2022-06-10 | Stop reason: SDUPTHER

## 2022-03-25 NOTE — TELEPHONE ENCOUNTER
[address] called office again looking for clarification on medication (Congentin) as the instructions list 2 different sets  Please call pharmacy to clarify   Pharmacy has stated they need to finalize order as soon as possible

## 2022-04-05 ENCOUNTER — TELEPHONE (OUTPATIENT)
Dept: PSYCHIATRY | Facility: CLINIC | Age: 57
End: 2022-04-05

## 2022-04-06 ENCOUNTER — APPOINTMENT (OUTPATIENT)
Dept: LAB | Facility: CLINIC | Age: 57
End: 2022-04-06
Payer: MEDICARE

## 2022-04-06 DIAGNOSIS — N18.30 TYPE 2 DIABETES MELLITUS WITH STAGE 3 CHRONIC KIDNEY DISEASE AND HYPERTENSION (HCC): ICD-10-CM

## 2022-04-06 DIAGNOSIS — R80.1 PERSISTENT PROTEINURIA: ICD-10-CM

## 2022-04-06 DIAGNOSIS — E11.22 TYPE 2 DIABETES MELLITUS WITH STAGE 3 CHRONIC KIDNEY DISEASE AND HYPERTENSION (HCC): ICD-10-CM

## 2022-04-06 DIAGNOSIS — Z79.4 TYPE 2 DIABETES MELLITUS WITH HYPERGLYCEMIA, WITH LONG-TERM CURRENT USE OF INSULIN (HCC): ICD-10-CM

## 2022-04-06 DIAGNOSIS — I12.9 BENIGN HYPERTENSION WITH CKD (CHRONIC KIDNEY DISEASE) STAGE III (HCC): ICD-10-CM

## 2022-04-06 DIAGNOSIS — Z11.59 ENCOUNTER FOR HEPATITIS C SCREENING TEST FOR LOW RISK PATIENT: ICD-10-CM

## 2022-04-06 DIAGNOSIS — E11.65 TYPE 2 DIABETES MELLITUS WITH HYPERGLYCEMIA, WITH LONG-TERM CURRENT USE OF INSULIN (HCC): ICD-10-CM

## 2022-04-06 DIAGNOSIS — N18.30 BENIGN HYPERTENSION WITH CKD (CHRONIC KIDNEY DISEASE) STAGE III (HCC): ICD-10-CM

## 2022-04-06 DIAGNOSIS — I12.9 TYPE 2 DIABETES MELLITUS WITH STAGE 3 CHRONIC KIDNEY DISEASE AND HYPERTENSION (HCC): ICD-10-CM

## 2022-04-06 DIAGNOSIS — Z11.4 SCREENING FOR HIV (HUMAN IMMUNODEFICIENCY VIRUS): ICD-10-CM

## 2022-04-06 LAB
25(OH)D3 SERPL-MCNC: 27.1 NG/ML (ref 30–100)
ALBUMIN SERPL BCP-MCNC: 2.9 G/DL (ref 3.5–5)
ALP SERPL-CCNC: 95 U/L (ref 46–116)
ALT SERPL W P-5'-P-CCNC: 15 U/L (ref 12–78)
ANION GAP SERPL CALCULATED.3IONS-SCNC: 7 MMOL/L (ref 4–13)
AST SERPL W P-5'-P-CCNC: 14 U/L (ref 5–45)
BILIRUB SERPL-MCNC: 0.2 MG/DL (ref 0.2–1)
BUN SERPL-MCNC: 19 MG/DL (ref 5–25)
CALCIUM ALBUM COR SERPL-MCNC: 9.6 MG/DL (ref 8.3–10.1)
CALCIUM SERPL-MCNC: 8.7 MG/DL (ref 8.3–10.1)
CHLORIDE SERPL-SCNC: 101 MMOL/L (ref 100–108)
CHOLEST SERPL-MCNC: 79 MG/DL
CO2 SERPL-SCNC: 28 MMOL/L (ref 21–32)
CREAT SERPL-MCNC: 2.1 MG/DL (ref 0.6–1.3)
CREAT UR-MCNC: 46.8 MG/DL
ERYTHROCYTE [DISTWIDTH] IN BLOOD BY AUTOMATED COUNT: 15.1 % (ref 11.6–15.1)
GFR SERPL CREATININE-BSD FRML MDRD: 34 ML/MIN/1.73SQ M
GLUCOSE P FAST SERPL-MCNC: 139 MG/DL (ref 65–99)
HCT VFR BLD AUTO: 36.5 % (ref 36.5–49.3)
HCV AB SER QL: NORMAL
HDLC SERPL-MCNC: 37 MG/DL
HGB BLD-MCNC: 12 G/DL (ref 12–17)
LDLC SERPL CALC-MCNC: 31 MG/DL (ref 0–100)
MCH RBC QN AUTO: 25.3 PG (ref 26.8–34.3)
MCHC RBC AUTO-ENTMCNC: 32.9 G/DL (ref 31.4–37.4)
MCV RBC AUTO: 77 FL (ref 82–98)
MICROALBUMIN UR-MCNC: 115 MG/L (ref 0–20)
MICROALBUMIN/CREAT 24H UR: 246 MG/G CREATININE (ref 0–30)
PHOSPHATE SERPL-MCNC: 2.7 MG/DL (ref 2.7–4.5)
PLATELET # BLD AUTO: 242 THOUSANDS/UL (ref 149–390)
PMV BLD AUTO: 10.2 FL (ref 8.9–12.7)
POTASSIUM SERPL-SCNC: 4.3 MMOL/L (ref 3.5–5.3)
PROT SERPL-MCNC: 7.3 G/DL (ref 6.4–8.2)
PTH-INTACT SERPL-MCNC: 86.1 PG/ML (ref 18.4–80.1)
RBC # BLD AUTO: 4.75 MILLION/UL (ref 3.88–5.62)
SODIUM SERPL-SCNC: 136 MMOL/L (ref 136–145)
TRIGL SERPL-MCNC: 57 MG/DL
WBC # BLD AUTO: 6.69 THOUSAND/UL (ref 4.31–10.16)

## 2022-04-06 PROCEDURE — 85027 COMPLETE CBC AUTOMATED: CPT

## 2022-04-06 PROCEDURE — 87389 HIV-1 AG W/HIV-1&-2 AB AG IA: CPT

## 2022-04-06 PROCEDURE — 82306 VITAMIN D 25 HYDROXY: CPT

## 2022-04-06 PROCEDURE — 36415 COLL VENOUS BLD VENIPUNCTURE: CPT

## 2022-04-06 PROCEDURE — 86803 HEPATITIS C AB TEST: CPT

## 2022-04-06 PROCEDURE — 82043 UR ALBUMIN QUANTITATIVE: CPT

## 2022-04-06 PROCEDURE — 83036 HEMOGLOBIN GLYCOSYLATED A1C: CPT

## 2022-04-06 PROCEDURE — 84100 ASSAY OF PHOSPHORUS: CPT

## 2022-04-06 PROCEDURE — 80053 COMPREHEN METABOLIC PANEL: CPT

## 2022-04-06 PROCEDURE — 83970 ASSAY OF PARATHORMONE: CPT

## 2022-04-06 PROCEDURE — 82570 ASSAY OF URINE CREATININE: CPT

## 2022-04-06 PROCEDURE — 80061 LIPID PANEL: CPT

## 2022-04-07 LAB
EST. AVERAGE GLUCOSE BLD GHB EST-MCNC: 157 MG/DL
HBA1C MFR BLD: 7.1 %
HIV 1+2 AB+HIV1 P24 AG SERPL QL IA: NORMAL

## 2022-04-14 ENCOUNTER — TELEPHONE (OUTPATIENT)
Dept: NEPHROLOGY | Facility: CLINIC | Age: 57
End: 2022-04-14

## 2022-05-04 ENCOUNTER — SOCIAL WORK (OUTPATIENT)
Dept: BEHAVIORAL/MENTAL HEALTH CLINIC | Facility: CLINIC | Age: 57
End: 2022-05-04
Payer: MEDICARE

## 2022-05-04 DIAGNOSIS — F41.1 GENERALIZED ANXIETY DISORDER: Primary | ICD-10-CM

## 2022-05-04 DIAGNOSIS — F31.62 BIPOLAR DISORDER, CURRENT EPISODE MIXED, MODERATE (HCC): ICD-10-CM

## 2022-05-04 PROCEDURE — 90834 PSYTX W PT 45 MINUTES: CPT | Performed by: SOCIAL WORKER
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
## 2022-05-11 ENCOUNTER — OFFICE VISIT (OUTPATIENT)
Dept: INTERNAL MEDICINE CLINIC | Facility: CLINIC | Age: 57
End: 2022-05-11

## 2022-05-11 VITALS
BODY MASS INDEX: 51 KG/M2 | WEIGHT: 315 LBS | DIASTOLIC BLOOD PRESSURE: 69 MMHG | HEART RATE: 97 BPM | SYSTOLIC BLOOD PRESSURE: 104 MMHG | TEMPERATURE: 97.2 F | OXYGEN SATURATION: 98 %

## 2022-05-11 DIAGNOSIS — D64.9 ANEMIA, UNSPECIFIED TYPE: Primary | ICD-10-CM

## 2022-05-11 DIAGNOSIS — E11.65 TYPE 2 DIABETES MELLITUS WITH HYPERGLYCEMIA, UNSPECIFIED WHETHER LONG TERM INSULIN USE (HCC): ICD-10-CM

## 2022-05-11 DIAGNOSIS — E55.9 VITAMIN D DEFICIENCY: ICD-10-CM

## 2022-05-11 PROCEDURE — 99214 OFFICE O/P EST MOD 30 MIN: CPT | Performed by: INTERNAL MEDICINE

## 2022-05-11 RX ORDER — INSULIN GLARGINE 100 [IU]/ML
INJECTION, SOLUTION SUBCUTANEOUS
Qty: 15 ML | Refills: 3 | Status: SHIPPED | OUTPATIENT
Start: 2022-05-11

## 2022-05-11 RX ORDER — MELATONIN
1000 DAILY
Qty: 30 TABLET | Refills: 0 | Status: SHIPPED | OUTPATIENT
Start: 2022-05-11

## 2022-05-11 RX ORDER — ATORVASTATIN CALCIUM 20 MG/1
20 TABLET, FILM COATED ORAL DAILY
Qty: 90 TABLET | Refills: 1 | Status: SHIPPED | OUTPATIENT
Start: 2022-05-11 | End: 2022-11-07

## 2022-05-11 NOTE — PROGRESS NOTES
Karan Franco 45  [address] [place] [address]    NAME: Cj Luevano  AGE: 55 y o  SEX: male    DATE OF ENCOUNTER: 5/11/2022    Assessment and Plan     1  Type 2 diabetes mellitus with hyperglycemia, unspecified whether long term insulin use (HCC)    -Last A1C 7 1  -Compliant with lantus 50units BID and lispro 28 TID w/ meals and Trulicity  -Diet and exercise  -No hypoglycemic episodes  Sugars most low 100s    -Continue current regimen  - Insulin Pen Needle 31G X 8 MM MISC; Check sugars three times a day  Dispense: 100 each; Refill: 1  - insulin glargine (Lantus SoloStar) 100 units/mL injection pen; Inject 50u twice a day  Dispense: 15 mL; Refill: 3  - atorvastatin (LIPITOR) 20 mg tablet; Take 1 tablet (20 mg total) by mouth in the morning  Dispense: 90 tablet; Refill: 1    2  Anemia, unspecified type    -Hb 12, MCV 77   -No melena, hemoptysis, hemetemesis  -Will repeat iron panel  Had c-scope in 2018  Unsure when to repeat  If iron deficient will repeat c-scope    - Iron Panel (Includes Ferritin, Iron Sat%, Iron, and TIBC); Future    3  Vitamin D deficiency    - cholecalciferol (VITAMIN D3) 1,000 units tablet; Take 1 tablet (1,000 Units total) by mouth in the morning  Dispense: 30 tablet; Refill: 0    No orders of the defined types were placed in this encounter  Chief Complaint     Chief Complaint   Patient presents with    Follow-up       History of Present Illness     HPI     Pt is a 56 y/o M w/ PMHx of HTN, insulin dependent controlled DM, CKD III who is here for a follow up  Pt is doing well overall  Compliant with his medications  Follow diet and exercise  Denies any hypoglycemic events  Sugars mostly runs low 100s  Denies any headaches, visual disturbances,chest pain, SOB,abdominal pain, nausea, vomiting, fever or chills  Constipations, hemoptysis, hematemesis, melena  Denies any smoking hx  Occasional alcohol use  Pharmacy requesting 90 day supply of medication of Gabapentin 300 mg   Pt's LOV 07/02/2020  Pt's NOV 08/12/2020  Medication pending   The following portions of the patient's history were reviewed and updated as appropriate: allergies, current medications, past family history, past medical history, past social history, past surgical history and problem list     Review of Systems     Review of Systems   Constitutional: Negative for chills, diaphoresis and unexpected weight change  HENT: Negative for hearing loss  Eyes: Negative for visual disturbance  Respiratory: Negative for cough, shortness of breath and wheezing  Gastrointestinal: Negative for nausea and vomiting  Endocrine: Negative for polydipsia and polyuria  Genitourinary: Negative for decreased urine volume and dysuria  Neurological: Negative for weakness, numbness and headaches  Active Problem List     Patient Active Problem List   Diagnosis    Bipolar affective disorder, current episode mixed ([facility] )    Panic attacks    Morbid obesity with BMI of 50 0-59 9, adult ([place] [address] )    Flat foot    Diabetic polyneuropathy ([facility] )    Benign hypertension with CKD (chronic kidney disease) stage III ([facility]    Allergic rhinitis    GERD (gastroesophageal reflux disease)    Insomnia    Hiatal hernia    Lower esophageal ring ([place])    Generalized anxiety disorder    DAISY (obstructive sleep apnea)    Stage 3 chronic kidney disease ([facility])    Type 2 diabetes mellitus with hyperglycemia, with long-term current use of insulin ([facility])    Persistent proteinuria    Anemia, unspecified    Toenail deformity    Hyperlipidemia    Extrapyramidal symptom    Vitamin D deficiency       Objective     /69 (BP Location: Right arm, Patient Position: Sitting, Cuff Size: Large)   Pulse 97   Temp (!) 97 2 °F (36 2 °C) (Temporal)   Wt (!) 143 kg (316 lb)   SpO2 98%   BMI 51 00 kg/m²     Physical Exam  Constitutional:       General: He is not in acute distress  Appearance: He is not ill-appearing, toxic-appearing or diaphoretic  HENT:      Head: Normocephalic and atraumatic  Cardiovascular:      Rate and Rhythm: Normal rate and regular rhythm  Pulses: Normal pulses  Heart sounds: Normal heart sounds  No murmur heard  No friction rub  No gallop  Pulmonary:      Effort: Pulmonary effort is normal       Breath sounds: Normal breath sounds  No stridor  No wheezing, rhonchi or rales  Chest:      Chest wall: No tenderness  Abdominal:      General: Bowel sounds are normal       Palpations: Abdomen is soft  Tenderness: There is no right CVA tenderness or left CVA tenderness  Musculoskeletal:      Right lower leg: No edema  Left lower leg: No edema  Neurological:      General: No focal deficit present     Psychiatric:         Mood and Affect: Mood normal          Behavior: Behavior normal          Pertinent Laboratory/Diagnostic Studies:  CBC:   Lab Results   Component Value Date/Time    WBC 6 69 04/06/2022 12:37 PM    WBC 6 44 12/15/2015 09:12 PM    RBC 4 75 04/06/2022 12:37 PM    RBC 4 93 12/15/2015 09:12 PM    HGB 12 0 04/06/2022 12:37 PM    HGB 12 1 12/15/2015 09:12 PM    HCT 36 5 04/06/2022 12:37 PM    HCT 35 6 (L) 12/15/2015 09:12 PM    MCV 77 (L) 04/06/2022 12:37 PM    MCV 72 (L) 12/15/2015 09:12 PM    MCH 25 3 (L) 04/06/2022 12:37 PM    MCH 24 5 (L) 12/15/2015 09:12 PM    MCHC 32 9 04/06/2022 12:37 PM    MCHC 34 0 12/15/2015 09:12 PM    RDW 15 1 04/06/2022 12:37 PM    RDW 14 4 12/15/2015 09:12 PM    MPV 10 2 04/06/2022 12:37 PM    MPV 9 9 12/15/2015 09:12 PM     04/06/2022 12:37 PM     12/15/2015 09:12 PM    NRBC 0 10/02/2018 11:16 AM    NEUTOPHILPCT 59 10/02/2018 11:16 AM    NEUTOPHILPCT 64 12/15/2015 09:12 PM    LYMPHOPCT 26 10/02/2018 11:16 AM    LYMPHOPCT 23 12/15/2015 09:12 PM    MONOPCT 10 10/02/2018 11:16 AM    MONOPCT 9 12/15/2015 09:12 PM    EOSPCT 3 10/02/2018 11:16 AM    EOSPCT 3 12/15/2015 09:12 PM    BASOPCT 1 10/02/2018 11:16 AM    BASOPCT 1 12/15/2015 09:12 PM    NEUTROABS 3 80 10/02/2018 11:16 AM    NEUTROABS 4 12 12/15/2015 09:12 PM    LYMPHSABS 1 68 10/02/2018 11:16 AM    LYMPHSABS 1 48 12/15/2015 09:12 PM    MONOSABS 0 61 10/02/2018 11:16 AM    MONOSABS 0 58 12/15/2015 09:12 PM    EOSABS 0 17 10/02/2018 11:16 AM    EOSABS 0 19 12/15/2015 09:12 PM     Chemistry Profile:   Lab Results   Component Value Date/Time     12/15/2015 09:12 PM    K 4 3 04/06/2022 12:37 PM    K Unable to analyze due to hemolysis 12/15/2015 09:12 PM     04/06/2022 12:37 PM     12/15/2015 09:12 PM    CO2 28 04/06/2022 12:37 PM    CO2 26 5 12/15/2015 09:12 PM    ANIONGAP 7 12/15/2015 09:12 PM    BUN 19 04/06/2022 12:37 PM    BUN 6 12/15/2015 09:12 PM    CREATININE 2 10 (H) 04/06/2022 12:37 PM    CREATININE 1 16 12/15/2015 09:12 PM    GLUC 147 (H) 08/02/2019 12:55 PM    GLUF 139 (H) 04/06/2022 12:37 PM    GLUCOSE 315 (H) 12/15/2015 09:12 PM    CALCIUM 8 7 04/06/2022 12:37 PM    CALCIUM 7 8 (L) 12/15/2015 09:12 PM    CORRECTEDCA 9 6 04/06/2022 12:37 PM    MG 1 8 11/09/2021 10:32 AM    PHOS 2 7 04/06/2022 12:37 PM    AST 14 04/06/2022 12:37 PM    AST Unable to analyze due to hemolysis 12/15/2015 09:12 PM    ALT 15 04/06/2022 12:37 PM    ALT 14 12/15/2015 09:12 PM    ALKPHOS 95 04/06/2022 12:37 PM    ALKPHOS 103 12/15/2015 09:12 PM    PROT 6 5 12/15/2015 09:12 PM    BILITOT 0 28 12/15/2015 09:12 PM    EGFR 34 04/06/2022 12:37 PM     CBC:   Results from Last 12 Months   Lab Units 04/06/22  1237   WBC Thousand/uL 6 69   RBC Million/uL 4 75   HEMOGLOBIN g/dL 12 0   HEMATOCRIT % 36 5   MCV fL 77*   MCH pg 25 3*   MCHC g/dL 32 9   RDW % 15 1   MPV fL 10 2   PLATELETS Thousands/uL 242     Chemistry Profile:   Results from Last 12 Months   Lab Units 04/06/22  1237 11/09/21  1032   POTASSIUM mmol/L 4 3 4 5   CHLORIDE mmol/L 101 103   CO2 mmol/L 28 25   BUN mg/dL 19 18   CREATININE mg/dL 2 10* 1 95*   GLUCOSE FASTING mg/dL 139* 121*   CALCIUM mg/dL 8 7 8 8   CORRECTED CALCIUM mg/dL 9 6 9 7   MAGNESIUM mg/dL  --  1 8   PHOSPHORUS mg/dL 2 7 3 7   AST U/L 14  -- ALT U/L 15  --    ALK PHOS U/L 95  --    EGFR ml/min/1 73sq m 34 43       Current Medications     Current Outpatient Medications:     amLODIPine (NORVASC) 5 mg tablet, Take 1 tablet (5 mg total) by mouth daily, Disp: 90 tablet, Rfl: 1    ammonium lactate (LAC-HYDRIN) 12 % cream, Apply topically as needed for dry skin, Disp: 385 g, Rfl: 0    atorvastatin (LIPITOR) 10 mg tablet, Take 1 tablet (10 mg total) by mouth daily, Disp: 90 tablet, Rfl: 1    benztropine (COGENTIN) 2 mg tablet, Take 1 tab po bid, Disp: 180 tablet, Rfl: 0    Blood Glucose Monitoring Suppl (FreeStyle Lite) w/Device KIT, Use as instructed 4 times a day, Disp: 1 kit, Rfl: 0    buPROPion (WELLBUTRIN) 100 mg tablet, Take 1 tablet (100 mg total) by mouth daily, Disp: 90 tablet, Rfl: 0    busPIRone (BUSPAR) 15 mg tablet, Take 1 tablet (15 mg total) by mouth 2 (two) times a day, Disp: 180 tablet, Rfl: 0    clonazePAM (KlonoPIN) 1 mg tablet, Take 1 tab po qd prn anxiety or panic attacks, Disp: 30 tablet, Rfl: 2    dextromethorphan-guaiFENesin (ROBITUSSIN DM)  mg/5 mL syrup, Take 5 mL by mouth 4 (four) times a day as needed for cough or congestion, Disp: 473 mL, Rfl: 1    FREESTYLE LITE test strip, CHECK BLOOD SUGARS FOUR TIMES DAILY, Disp: 400 strip, Rfl: 3    gabapentin (NEURONTIN) 300 mg capsule, Take 1 capsule (300mg) by mouth twice daily  , Disp: 180 capsule, Rfl: 1    glucose monitoring kit (FREESTYLE) monitoring kit, Use 1 each 4 (four) times a day Fine to substitute other brand if not covered by insurance, Disp: 1 each, Rfl: 0    HumaLOG KwikPen 100 units/mL injection pen, INJECT 28 UNITS THREE TIMES DAILY WITH MEALS PLUS SCALE UP  UNITS DAILY, Disp: 105 mL, Rfl: 0    Insulin Pen Needle 31G X 8 MM MISC, by Does not apply route, Disp: , Rfl:     INSULIN SYRINGE  5CC/29G 29G X 1/2" 0 5 ML MISC, by Does not apply route, Disp: , Rfl:     Lancets (FREESTYLE) lancets, USE THREE TIMES DAILY AS DIRECTED, Disp: 300 each, Rfl: 0    Lantus SoloStar 100 units/mL injection pen, ADMINISTER 50 UNITS UNDER THE SKIN EVERY 12 HOURS, Disp: 15 mL, Rfl: 3    lisinopril-hydrochlorothiazide (PRINZIDE,ZESTORETIC) 20-25 MG per tablet, Take 1 tablet by mouth daily, Disp: 90 tablet, Rfl: 1    lurasidone (Latuda) 120 mg tablet, Take 1 tablet (120 mg total) by mouth daily with dinner, Disp: 90 tablet, Rfl: 0    omeprazole (PriLOSEC) 20 mg delayed release capsule, Take 1 capsule (20 mg total) by mouth daily, Disp: 90 capsule, Rfl: 1    traZODone (DESYREL) 50 mg tablet, Take 1/2 to 1 tab po qhs prn insomnia, Disp: 90 tablet, Rfl: 0    Trulicity 1 5 TZ/4 2NA SOPN, INJECT ONCE WEEKLY, Disp: 6 mL, Rfl: 1    ascorbic acid (VITAMIN C) 250 mg tablet, Take 1 tablet (250 mg total) by mouth daily (Patient not taking: No sig reported), Disp: 90 tablet, Rfl: 3    Blood Glucose Monitoring Suppl (FREESTYLE LITE) ANTONIA, by Does not apply route 3 (three) times a day (Patient not taking: Reported on 5/11/2022), Disp: 1 each, Rfl: 0    Continuous Blood Gluc  (FreeStyle [place] 14 Day [place]) ANTONIA, Use  to check BG at least 4 times a day (Patient not taking: No sig reported), Disp: 1 each, Rfl: 0    Continuous Blood Gluc Sensor (FreeStyle [place] 14 Day Sensor) [facility], Apply sensor every 14 days to check BG at least 4 times a day (Patient not taking: No sig reported), Disp: 2 each, Rfl: 5    ferrous sulfate (EQL Iron Supplement Therapy) 325 (65 Fe) mg tablet, Take 1 tablet (325 mg total) by mouth daily with breakfast (Patient not taking: No sig reported), Disp: 90 tablet, Rfl: 2    fluticasone (FLONASE) 50 mcg/act nasal spray, 1 spray into each nostril daily (Patient not taking: No sig reported), Disp: 18 2 mL, Rfl: 1    sodium chloride (OCEAN) 0 65 % nasal spray, 1 spray into each nostril daily at bedtime (Patient not taking: No sig reported), Disp: 104 mL, Rfl: 2    Health Maintenance     Health Maintenance   Topic Date Due   Coleen Albrecht Medicare Annual Wellness Visit (AWV)  Never done    Pneumococcal Vaccine: Pediatrics (0 to 5 Years) and At-Risk Patients (6 to 59 Years) (2 - PCV) 12/04/2019    Diabetic Foot Exam  09/03/2021    COVID-19 Vaccine (3 - Booster for Pfizer series) 09/24/2021    HEMOGLOBIN A1C  07/06/2022    BMI: Followup Plan  09/14/2022    DTaP,Tdap,and Td Vaccines (1 - Tdap) 03/04/2024 (Originally 6/20/1986)    DM Eye Exam  08/26/2022    BMI: Adult  02/21/2023    Colorectal Cancer Screening  09/12/2028    HIV Screening  Completed    Hepatitis C Screening  Completed    Influenza Vaccine  Completed    HIB Vaccine  Aged Out    Hepatitis B Vaccine  Aged Out    IPV Vaccine  Aged Out    Hepatitis A Vaccine  Aged Out    Meningococcal ACWY Vaccine  Aged Out    HPV Vaccine  Aged Out     Immunization History   Administered Date(s) Administered    COVID-19 PFIZER VACCINE 0 3 ML IM 03/30/2021, 04/24/2021    INFLUENZA 12/11/2014, 10/09/2015, 12/21/2016, 09/28/2017    Influenza Quadrivalent, 6-35 Months IM 12/21/2016    Influenza, recombinant, quadrivalent,injectable, preservative free 12/04/2018, 11/01/2019, 10/23/2020, 01/24/2022    Influenza, seasonal, injectable 12/11/2014, 10/09/2015, 09/28/2017    Pneumococcal Polysaccharide PPV23 12/04/2018       Royal Primrose, MD  PGY 2

## 2022-05-23 ENCOUNTER — OFFICE VISIT (OUTPATIENT)
Dept: MULTI SPECIALTY CLINIC | Facility: CLINIC | Age: 57
End: 2022-05-23

## 2022-05-23 VITALS
OXYGEN SATURATION: 98 % | WEIGHT: 315 LBS | BODY MASS INDEX: 51.07 KG/M2 | SYSTOLIC BLOOD PRESSURE: 101 MMHG | DIASTOLIC BLOOD PRESSURE: 71 MMHG | HEART RATE: 91 BPM | TEMPERATURE: 97.6 F

## 2022-05-23 DIAGNOSIS — N18.30 TYPE 2 DIABETES MELLITUS WITH STAGE 3 CHRONIC KIDNEY DISEASE AND HYPERTENSION (HCC): ICD-10-CM

## 2022-05-23 DIAGNOSIS — E11.22 TYPE 2 DIABETES MELLITUS WITH STAGE 3 CHRONIC KIDNEY DISEASE AND HYPERTENSION (HCC): ICD-10-CM

## 2022-05-23 DIAGNOSIS — B35.1 ONYCHOMYCOSIS: Primary | ICD-10-CM

## 2022-05-23 DIAGNOSIS — R60.0 BILATERAL LEG EDEMA: ICD-10-CM

## 2022-05-23 DIAGNOSIS — I12.9 TYPE 2 DIABETES MELLITUS WITH STAGE 3 CHRONIC KIDNEY DISEASE AND HYPERTENSION (HCC): ICD-10-CM

## 2022-05-23 PROCEDURE — 99213 OFFICE O/P EST LOW 20 MIN: CPT | Performed by: PODIATRIST

## 2022-05-23 PROCEDURE — 11721 DEBRIDE NAIL 6 OR MORE: CPT | Performed by: PODIATRIST

## 2022-05-23 NOTE — PROGRESS NOTES
[address] 55 y o  male MRN: [id number]  Encounter: [id number]    ASSESSMENT:       Diagnoses and all orders for this visit:    Onychomycosis    Type 2 diabetes mellitus with stage 3 chronic kidney disease and hypertension (HCC)    Bilateral leg edema  -     Compression Stocking       PLAN:     Nails x10 debrided in thickeness and length with removal of subungual debris  [address]   Encouraged compressive stalking for bilateral edema  And follow up with PCP   Encouraged tight glycemic control, proper diet, and adequate exercise   Continue use of supportive shoe gear with wide toebox   Encouraged daily at home foot checks   Last HbA1c was 7 1% on 4/6/22   RTC in 3months      - Dr Samara Almendarez was present for entirety of patient encounter  Nail debridement     Date/Time 5/23/2022 1:49 PM     Performed by  Melissa Marley DPM     Authorized by Melissa Marley DPM      Universal Protocol   Consent: Verbal consent obtained  Consent given by: patient  Patient understanding: patient states understanding of the procedure being performed  Patient identity confirmed: verbally with patient        Local anesthesia used: no     Anesthesia   Local anesthesia used: no     Sedation   Patient sedated: no        Specimen: no    Culture: no   Procedure Details   Procedure Notes: Debrided nails in thickness and in length with large nail nippers x10  Patient tolerance: patient tolerated the procedure well with no immediate complications                 SUBJECTIVE:    History of Present Illness     Kyle Castanon is a 55 y o  male who presents with patient coming in for nail debridement  Doesn't have any other foot or ankle issues            Review of Systems   Constitutional: Negative  HENT: Negative  Eyes: Negative  Respiratory: Negative  Cardiovascular: Negative  Gastrointestinal: Negative      Musculoskeletal: negative  Skin: onychomycosis  Neurological: negative      Historical Information   Past Medical History:   Diagnosis Date    ADHD, adult residual type     Anxiety     Anxiety     Bipolar 1 disorder (HCC)     Cataract     Left eye    Depression     Depression     Diabetes mellitus (HCC)     blood sugar 167 on admission    Equinus deformity of foot     GERD (gastroesophageal reflux disease)     Homicidal ideations     Hyperlipidemia     Hypertension     Microalbuminuria     Morbid obesity (HCC)     Neuropathy     Shortness of breath     Sleep apnea     CPAP at bedtime    Sleep apnea     Stroke ([address] )     Suicidal intent      Past Surgical History:   Procedure Laterality Date    CATARACT EXTRACTION      CATARACT EXTRACTION      HAND SURGERY Right     OTHER SURGICAL HISTORY      REPAIR OF SUPERFICIAL WOUND FACE    NM ESOPHAGOGASTRODUODENOSCOPY TRANSORAL DIAGNOSTIC N/A 2018    Procedure: ESOPHAGOGASTRODUODENOSCOPY (EGD); Surgeon: Mary Park MD;  Location: [facility]; Service: Gastroenterology    NM ESOPHAGOGASTRODUODENOSCOPY TRANSORAL DIAGNOSTIC N/A 2018    Procedure: EGD AND COLONOSCOPY;  Surgeon: Mary Park MD;  Location: [facility];   Service: Gastroenterology     Social History   Social History     Substance and Sexual Activity   Alcohol Use Yes    Comment: rarely     Social History     Substance and Sexual Activity   Drug Use Not Currently    Comment: quit 20 years ago     Social History     Tobacco Use   Smoking Status Former Smoker    Types: Cigarettes    Quit date: 5    Years since quittin 4   Smokeless Tobacco Former User    Types: Chew   Tobacco Comment    pt "Quit after approx over 25 years ago"     Family History:   Family History   Problem Relation Age of Onset    Diabetes Mother     Alcohol abuse Father     Diabetes Father     Hypertension Father     Lung cancer Father     Stroke Father     Cancer Father         lung    Alcohol abuse Sister     Depression Sister     Lymphoma Sister     Alcohol abuse Family     Alcohol abuse Sister     Alcohol abuse Sister     Cancer Sister     Heart disease Neg Hx     Thyroid disease Neg Hx        Meds/Allergies   (Not in a hospital admission)    Allergies   Allergen Reactions    Pollen Extract Nasal Congestion    Tetanus Toxoid Swelling         OBJECTIVE:    Current Vitals:   Blood Pressure: 101/71 (05/23/22 1335)  Pulse: 91 (05/23/22 1335)  Temperature: 97 6 °F (36 4 °C) (05/23/22 1335)  Temp Source: Temporal (05/23/22 1335)  Weight - Scale: (!) 144 kg (316 lb 6 4 oz) (05/23/22 1335)  SpO2: 98 % (05/23/22 1335)        /71 (BP Location: Right arm, Patient Position: Sitting, Cuff Size: Standard)   Pulse 91   Temp 97 6 °F (36 4 °C) (Temporal)   Wt (!) 144 kg (316 lb 6 4 oz)   SpO2 98%   BMI 51 07 kg/m²       Lower Extremity Exam:    Physical Exam:    Vascular:   DP/PT palpable bilateral  Cap refill <3 seconds  Feet are warm with 2+ pitting edema    Musculoskeletal:  5/5 muscular strength  Limited ankle range of motion with maintained STJ  Pes planus    Neurological:  Epicritic intact  Protective sensation diminished with SWMF  Sharp dull discrimination is intact bilateral      Dermatological:  Skin is normal with good turgor and texture  Nails are thick, discolored flaky on exam  No calluses, ulcers or lesions are present                                Imaging: I have personally reviewed pertinent films in PACS    US retroperitoneal complete    Result Date: 4/30/2019  Impression: Right renal cyst Workstation performed: [id number]     EKG, Pathology, and Other Studies: I have personally reviewed pertinent reports  ** Please Note: Portions of the record may have been created with voice recognition software  Occasional wrong word or "sound a like" substitutions may have occurred due to the inherent limitations of voice recognition software   Read the chart carefully and recognize, using context, where substitutions have occurred   **

## 2022-05-25 ENCOUNTER — OFFICE VISIT (OUTPATIENT)
Dept: NEPHROLOGY | Facility: CLINIC | Age: 57
End: 2022-05-25
Payer: MEDICARE

## 2022-05-25 VITALS
BODY MASS INDEX: 49.98 KG/M2 | SYSTOLIC BLOOD PRESSURE: 122 MMHG | WEIGHT: 311 LBS | HEIGHT: 66 IN | DIASTOLIC BLOOD PRESSURE: 78 MMHG

## 2022-05-25 DIAGNOSIS — E55.9 VITAMIN D DEFICIENCY: ICD-10-CM

## 2022-05-25 DIAGNOSIS — N18.32 STAGE 3B CHRONIC KIDNEY DISEASE (HCC): ICD-10-CM

## 2022-05-25 DIAGNOSIS — R79.89 ELEVATED SERUM CREATININE: Primary | ICD-10-CM

## 2022-05-25 DIAGNOSIS — N18.30 TYPE 2 DIABETES MELLITUS WITH STAGE 3 CHRONIC KIDNEY DISEASE AND HYPERTENSION (HCC): ICD-10-CM

## 2022-05-25 DIAGNOSIS — E11.22 TYPE 2 DIABETES MELLITUS WITH STAGE 3 CHRONIC KIDNEY DISEASE AND HYPERTENSION (HCC): ICD-10-CM

## 2022-05-25 DIAGNOSIS — I12.9 TYPE 2 DIABETES MELLITUS WITH STAGE 3 CHRONIC KIDNEY DISEASE AND HYPERTENSION (HCC): ICD-10-CM

## 2022-05-25 DIAGNOSIS — E66.01 MORBID OBESITY WITH BMI OF 50.0-59.9, ADULT (HCC): ICD-10-CM

## 2022-05-25 PROCEDURE — 99214 OFFICE O/P EST MOD 30 MIN: CPT | Performed by: INTERNAL MEDICINE

## 2022-05-25 NOTE — PROGRESS NOTES
NEPHROLOGY OFFICE VISIT   London Moore 55 y o  male MRN: [id number]  5/25/2022    Reason for Visit:  Chronic kidney disease    History of Present Illness (HPI):    I had the pleasure of seeing Hue Saleh today in the renal clinic for the continued management of chronic kidney disease  Since our last visit, there has been no ER visits or hospitilizations  He currently has no complaints at this time and is feeling well  Patient denies any chest pain, shortness of breath and swelling  The last blood work was done on April, which we have reviewed together  His most recent blood work was done fasting  Does not check his blood pressure at home  Takes his blood pressure medications usually at lunchtime  Has not been exercising    ASSESSMENT and PLAN:    1 ) Chronic kidney disease stage IIIB with microalbuminuria  -baseline creatinine appears to be between 1 5-2 mg/dL (high 1's usually)  -most recent creatinine stable at 2 1 mg/dL, slight elevation could be reflection fasting, will check a repeat BMP nonfasting  -renal ultrasound shows the right kidney measuring 10 8 cm in the left kidney measuring 10 2 cm   Right kidney has an upper pole cyst measuring about 1 2 cm    -microalbumin/creatinine ratio 0 2  -presumed to be secondary to diabetic nephropathy due to evidence of microalbuminuria as well as it being poorly controlled for many years, with a component of hypertension and possible obesity   No family history of kidney disease   Possible APO-L1 associated nephropathy   -blood pressure and diabetic control  -educated on diet and weight loss   -avoid NSAIDs  -continue ACE-inhibitor     2  ) Proteinuria  -- presumed to be secondary to diabetic nephropathy possible obesity related  -- 24 hour urine protein or urine protein creatinine ratio:  Microalbumin/creatinine ratio 0 2  -- Current Blood Pressure:  122/78  -- current anti proteinuric medications:  Lisinopril/hydrochlorothiazide  -- Interventions:  Weight loss, low 2 g sodium diet, diabetic and blood pressure control aiming for blood pressure less than 130/80, continue ACE-inhibitor, avoid NSAIDs  -- General Recommendations:  · RAAS Blockade with high dose ARB or ACEI for target blood pressure < 130/80 as tolerated, with spironolactone as a good adjunct for anti proteinuric effect   Management of hypervolemia for blood pressure control as needed  · Avoid combination ACEI + ARB, due to high adverse effects (ONTARGET study)  · Direct Renin Inhibitor + ACEI or ARB has FDA black box warning for patients with diabetes and GFR < 60 cc/min due to risk for non fatal stroke, renal complications, hyperkalemia and hypotension (ALTITUDE study)  · Moderate dietary protein restriction 0 8 gm/kg  · Recommend statin therapy if no contraindications  · Recommend 1, 25 vitamin-D such as Paricalcitol if appropropriate (VITAL study)     3 ) Diabetes mellitus type 2  -hemoglobin A1c: 7 1 (A1C values may be falsely elevated or decreased in those with CKD, assay method should be certified by [employer])  Target A1C < 7  -current medications:  Trulicity  -proteinuria:  Mild  -retinopathy:  Yes  -neuropathy:  Yes  -please follow with an ophthalmologist and podiatrist every year  -continued self monitoring of blood glucose at home  -Treatment Management in CKD Recommendations:   · Metformin:  Avoid if creatinine clearance is less than 30 cc/min (concern for lactic acidosis)  · Sodium-Glucose Cotransporter-2 (SGLT2) Inhibitors:  May see an acute drop in the eGFR initially when starting the medication but then a stabilization of the renal function, with slower loss of renal function as compared to placebo   Relative risk of end-stage renal disease, doubling of serum creatinine or death from renal causes were also found to be lower as compared to placebo  ([facility])    Would recommend starting at 100 mg daily, I would avoid use in patients with eGFR < 30 cc/min   If no contraindications exist I would recommend this medication added to the diabetic regiment  · Sulfonylureas:  Preferred short-acting (glipizide, glimepiride, repaglinide), relatively safe in patients with non dialysis CKD  · Thiazolidinedones/Alpha-Gluosidase inhibitors/Dipeptidyl peptidase-4 inhibitors:  Generally not considered first-line agents in CKD (limited data in long-term safety and efficacy)  · Insulin:  Starting dose may need to be lower than what would ordinarily be used, as there is a decrease metabolism of insulin (no dose adjustment if the GFR > 50 mL/min, dose should be reduced to 75% of baseline if GFR 10-50 mL/min, and 50% baseline if GFR < 10 mL/min)     4  ) Hypertension  -- with underlying chronic kidney disease and proteinuria  -- blood pressure at target  -- target blood pressure less than 130/80  -- continue lisinopril/hydrochlorothiazide 20-25 mg daily  -- low 2 g sodium diet  -- weight loss and exercise program     5  ) Anemia of chronic kidney disease  --stable     6 ) Morbid obesity  -- recommend a diet exercise and weight loss program     7 ) Obstructive sleep apnea  --continued use of CPAP      PATIENT INSTRUCTIONS:    Patient Instructions   1 )  Low 2 g sodium diet    2 )  Monitor weights at home    3 )  Avoid NSAIDs (ibuprofen, Motrin, Advil, Aleve, naproxen)    4 )  Monitor blood pressure at home, call if blood pressure greater than 150/90 persistently    5 ) I will plan to discuss all results including blood work, and/or imaging at our next visit, unless there is an urgent indication, in which case I will call you earlier  If you have any questions or concerns about your results, please feel free to call our office      6 ) Start OTC Vitamin D, try for 1000 units daily    7 ) Check repeat blood work in next few weeks, DON'T FAST          Orders Placed This Encounter   Procedures    Basic metabolic panel     This is a patient instruction: Patient fasting for 8 hours or longer recommended  Standing Status:   Future     Standing Expiration Date:   5/25/2023    Comprehensive metabolic panel     This is a patient instruction: Patient fasting for 8 hours or longer recommended  Standing Status:   Future     Standing Expiration Date:   5/25/2023    PTH, intact     Standing Status:   Future     Standing Expiration Date:   5/25/2023    Microalbumin / creatinine urine ratio     Standing Status:   Future     Standing Expiration Date:   5/25/2023    CBC     Standing Status:   Future     Standing Expiration Date:   5/25/2023    Cystatin C With eGFR     Standing Status:   Future     Standing Expiration Date:   5/25/2023       OBJECTIVE:  Current Weight: Weight - Scale: (!) 141 kg (311 lb)  Vitals:    05/25/22 1257 05/25/22 1315   BP:  122/78   Weight: (!) 141 kg (311 lb)    Height: 5' 6" (1 676 m)     Body mass index is 50 2 kg/m²  REVIEW OF SYSTEMS:    Review of Systems   Constitutional: Negative for activity change and fever  Respiratory: Negative for cough, chest tightness, shortness of breath and wheezing  Cardiovascular: Negative for chest pain and leg swelling  Gastrointestinal: Negative for abdominal pain, diarrhea, nausea and vomiting  Endocrine: Negative for polyuria  Genitourinary: Negative for difficulty urinating, dysuria, flank pain, frequency and urgency  Skin: Negative for rash  Neurological: Negative for dizziness, syncope, light-headedness and headaches  PHYSICAL EXAM:      Physical Exam  Vitals and nursing note reviewed  Constitutional:       General: He is not in acute distress  Appearance: He is well-developed  HENT:      Head: Normocephalic and atraumatic  Eyes:      General: No scleral icterus  Conjunctiva/sclera: Conjunctivae normal       Pupils: Pupils are equal, round, and reactive to light  Cardiovascular:      Rate and Rhythm: Normal rate and regular rhythm        Heart sounds: S1 normal and S2 normal  No murmur heard     No friction rub  No gallop  Pulmonary:      Effort: Pulmonary effort is normal  No respiratory distress  Breath sounds: Normal breath sounds  No wheezing or rales  Abdominal:      General: Bowel sounds are normal       Palpations: Abdomen is soft  Tenderness: There is no abdominal tenderness  There is no rebound  Musculoskeletal:         General: Normal range of motion  Cervical back: Normal range of motion and neck supple  Skin:     Findings: No rash  Neurological:      Mental Status: He is alert and oriented to person, place, and time  Psychiatric:         Behavior: Behavior normal          Medications:    Current Outpatient Medications:     amLODIPine (NORVASC) 5 mg tablet, Take 1 tablet (5 mg total) by mouth daily, Disp: 90 tablet, Rfl: 1    ammonium lactate (LAC-HYDRIN) 12 % cream, Apply topically as needed for dry skin, Disp: 385 g, Rfl: 0    atorvastatin (LIPITOR) 20 mg tablet, Take 1 tablet (20 mg total) by mouth in the morning , Disp: 90 tablet, Rfl: 1    benztropine (COGENTIN) 2 mg tablet, Take 1 tab po bid, Disp: 180 tablet, Rfl: 0    Blood Glucose Monitoring Suppl (FREESTYLE LITE) ANTONIA, by Does not apply route 3 (three) times a day, Disp: 1 each, Rfl: 0    Blood Glucose Monitoring Suppl (FreeStyle Lite) w/Device KIT, Use as instructed 4 times a day, Disp: 1 kit, Rfl: 0    buPROPion (WELLBUTRIN) 100 mg tablet, Take 1 tablet (100 mg total) by mouth daily, Disp: 90 tablet, Rfl: 0    busPIRone (BUSPAR) 15 mg tablet, Take 1 tablet (15 mg total) by mouth 2 (two) times a day, Disp: 180 tablet, Rfl: 0    clonazePAM (KlonoPIN) 1 mg tablet, Take 1 tab po qd prn anxiety or panic attacks, Disp: 30 tablet, Rfl: 2    FREESTYLE LITE test strip, CHECK BLOOD SUGARS FOUR TIMES DAILY, Disp: 400 strip, Rfl: 3    gabapentin (NEURONTIN) 300 mg capsule, Take 1 capsule (300mg) by mouth twice daily  , Disp: 180 capsule, Rfl: 1    glucose monitoring kit (FREESTYLE) monitoring kit, Use 1 each 4 (four) times a day Fine to substitute other brand if not covered by insurance, Disp: 1 each, Rfl: 0    HumaLOG KwikPen 100 units/mL injection pen, INJECT 28 UNITS THREE TIMES DAILY WITH MEALS PLUS SCALE UP  UNITS DAILY, Disp: 105 mL, Rfl: 0    insulin glargine (Lantus SoloStar) 100 units/mL injection pen, Inject 50u twice a day , Disp: 15 mL, Rfl: 3    Insulin Pen Needle 31G X 8 MM MISC, Check sugars three times a day, Disp: 100 each, Rfl: 1    INSULIN SYRINGE  5CC/29G 29G X 1/2" 0 5 ML MISC, by Does not apply route, Disp: , Rfl:     Lancets (FREESTYLE) lancets, USE THREE TIMES DAILY AS DIRECTED, Disp: 300 each, Rfl: 0    lisinopril-hydrochlorothiazide (PRINZIDE,ZESTORETIC) 20-25 MG per tablet, Take 1 tablet by mouth daily, Disp: 90 tablet, Rfl: 1    lurasidone (Latuda) 120 mg tablet, Take 1 tablet (120 mg total) by mouth daily with dinner, Disp: 90 tablet, Rfl: 0    omeprazole (PriLOSEC) 20 mg delayed release capsule, Take 1 capsule (20 mg total) by mouth daily, Disp: 90 capsule, Rfl: 1    traZODone (DESYREL) 50 mg tablet, Take 1/2 to 1 tab po qhs prn insomnia, Disp: 90 tablet, Rfl: 0    Trulicity 1 5 GN/1 9LQ SOPN, INJECT ONCE WEEKLY, Disp: 6 mL, Rfl: 1    ascorbic acid (VITAMIN C) 250 mg tablet, Take 1 tablet (250 mg total) by mouth daily (Patient not taking: No sig reported), Disp: 90 tablet, Rfl: 3    cholecalciferol (VITAMIN D3) 1,000 units tablet, Take 1 tablet (1,000 Units total) by mouth in the morning   (Patient not taking: No sig reported), Disp: 30 tablet, Rfl: 0    Continuous Blood Gluc  (FreeStyle [place] 14 Day [place]) ANTONIA, Use  to check BG at least 4 times a day (Patient not taking: No sig reported), Disp: 1 each, Rfl: 0    Continuous Blood Gluc Sensor (FreeStyle [place] 14 Day Sensor) MISC, Apply sensor every 14 days to check BG at least 4 times a day (Patient not taking: No sig reported), Disp: 2 each, Rfl: 5    dextromethorphan-guaiFENesin (ROBITUSSIN DM)  mg/5 mL syrup, Take 5 mL by mouth 4 (four) times a day as needed for cough or congestion (Patient not taking: No sig reported), Disp: 473 mL, Rfl: 1    ferrous sulfate (EQL Iron Supplement Therapy) 325 (65 Fe) mg tablet, Take 1 tablet (325 mg total) by mouth daily with breakfast (Patient not taking: No sig reported), Disp: 90 tablet, Rfl: 2    fluticasone (FLONASE) 50 mcg/act nasal spray, 1 spray into each nostril daily (Patient not taking: No sig reported), Disp: 18 2 mL, Rfl: 1    sodium chloride (OCEAN) 0 65 % nasal spray, 1 spray into each nostril daily at bedtime (Patient not taking: No sig reported), Disp: 104 mL, Rfl: 2    Laboratory Results:        Invalid input(s): ALBUMIN    Results for orders placed or performed in visit on 04/06/22   Comprehensive metabolic panel   Result Value Ref Range    Sodium 136 136 - 145 mmol/L    Potassium 4 3 3 5 - 5 3 mmol/L    Chloride 101 100 - 108 mmol/L    CO2 28 21 - 32 mmol/L    ANION GAP 7 4 - 13 mmol/L    BUN 19 5 - 25 mg/dL    Creatinine 2 10 (H) 0 60 - 1 30 mg/dL    Glucose, Fasting 139 (H) 65 - 99 mg/dL    Calcium 8 7 8 3 - 10 1 mg/dL    Corrected Calcium 9 6 8 3 - 10 1 mg/dL    AST 14 5 - 45 U/L    ALT 15 12 - 78 U/L    Alkaline Phosphatase 95 46 - 116 U/L    Total Protein 7 3 6 4 - 8 2 g/dL    Albumin 2 9 (L) 3 5 - 5 0 g/dL    Total Bilirubin 0 20 0 20 - 1 00 mg/dL    eGFR 34 ml/min/1 73sq m   Lipid Panel with Direct LDL reflex   Result Value Ref Range    Cholesterol 79 See Comment mg/dL    Triglycerides 57 See Comment mg/dL    HDL, Direct 37 (L) >=40 mg/dL    LDL Calculated 31 0 - 100 mg/dL   Hepatitis C antibody   Result Value Ref Range    Hepatitis C Ab Non-reactive Non-reactive   HIV 1/2 Antigen/Antibody (4th Generation) w Reflex [facility]   Result Value Ref Range    HIV-1/HIV-2 Ab Non-Reactive Non-Reactive   PTH, intact   Result Value Ref Range    PTH 86 1 (H) 18 4 - 80 1 pg/mL   Phosphorus   Result Value Ref Range    Phosphorus 2 7 2 7 - 4 5 mg/dL Microalbumin / creatinine urine ratio   Result Value Ref Range    Creatinine, Ur 46 8 mg/dL    Microalbum  ,U,Random 115 0 (H) 0 0 - 20 0 mg/L    Microalb Creat Ratio 246 (H) 0 - 30 mg/g creatinine   CBC   Result Value Ref Range    WBC 6 69 4 31 - 10 16 Thousand/uL    RBC 4 75 3 88 - 5 62 Million/uL    Hemoglobin 12 0 12 0 - 17 0 g/dL    Hematocrit 36 5 36 5 - 49 3 %    MCV 77 (L) 82 - 98 fL    MCH 25 3 (L) 26 8 - 34 3 pg    MCHC 32 9 31 4 - 37 4 g/dL    RDW 15 1 11 6 - 15 1 %    Platelets 081 326 - 632 Thousands/uL    MPV 10 2 8 9 - 12 7 fL   Vitamin D 25 hydroxy   Result Value Ref Range    Vit D, 25-Hydroxy 27 1 (L) 30 0 - 100 0 ng/mL   Hemoglobin A1C   Result Value Ref Range    Hemoglobin A1C 7 1 (H) Normal 3 8-5 6%; PreDiabetic 5 7-6 4%;  Diabetic >=6 5%; Glycemic control for adults with diabetes <7 0% %     mg/dl

## 2022-05-25 NOTE — PATIENT INSTRUCTIONS
1  )  Low 2 g sodium diet    2 )  Monitor weights at home    3 )  Avoid NSAIDs (ibuprofen, Motrin, Advil, Aleve, naproxen)    4 )  Monitor blood pressure at home, call if blood pressure greater than 150/90 persistently    5 ) I will plan to discuss all results including blood work, and/or imaging at our next visit, unless there is an urgent indication, in which case I will call you earlier  If you have any questions or concerns about your results, please feel free to call our office      6 ) Start OTC Vitamin D, try for 1000 units daily    7 ) Check repeat blood work in next few weeks, DON'T FAST
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
## 2022-06-08 ENCOUNTER — SOCIAL WORK (OUTPATIENT)
Dept: BEHAVIORAL/MENTAL HEALTH CLINIC | Facility: CLINIC | Age: 57
End: 2022-06-08
Payer: MEDICARE

## 2022-06-08 DIAGNOSIS — G47.00 INSOMNIA, UNSPECIFIED TYPE: ICD-10-CM

## 2022-06-08 DIAGNOSIS — F31.61 BIPOLAR DISORDER, CURRENT EPISODE MIXED, MILD (HCC): ICD-10-CM

## 2022-06-08 DIAGNOSIS — F41.1 GENERALIZED ANXIETY DISORDER: Primary | ICD-10-CM

## 2022-06-08 PROCEDURE — 90834 PSYTX W PT 45 MINUTES: CPT | Performed by: SOCIAL WORKER
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
## 2022-06-10 ENCOUNTER — OFFICE VISIT (OUTPATIENT)
Dept: PSYCHIATRY | Facility: CLINIC | Age: 57
End: 2022-06-10
Payer: MEDICARE

## 2022-06-10 ENCOUNTER — LAB (OUTPATIENT)
Dept: LAB | Facility: CLINIC | Age: 57
End: 2022-06-10
Payer: MEDICARE

## 2022-06-10 DIAGNOSIS — D64.9 ANEMIA, UNSPECIFIED TYPE: ICD-10-CM

## 2022-06-10 DIAGNOSIS — E11.65 TYPE 2 DIABETES MELLITUS WITH HYPERGLYCEMIA, WITH LONG-TERM CURRENT USE OF INSULIN (HCC): ICD-10-CM

## 2022-06-10 DIAGNOSIS — F31.61 BIPOLAR DISORDER, CURRENT EPISODE MIXED, MILD (HCC): ICD-10-CM

## 2022-06-10 DIAGNOSIS — I12.9 BENIGN HYPERTENSION WITH CKD (CHRONIC KIDNEY DISEASE) STAGE III (HCC): ICD-10-CM

## 2022-06-10 DIAGNOSIS — G47.00 INSOMNIA, UNSPECIFIED TYPE: ICD-10-CM

## 2022-06-10 DIAGNOSIS — F41.1 GENERALIZED ANXIETY DISORDER: Primary | Chronic | ICD-10-CM

## 2022-06-10 DIAGNOSIS — N18.30 BENIGN HYPERTENSION WITH CKD (CHRONIC KIDNEY DISEASE) STAGE III (HCC): ICD-10-CM

## 2022-06-10 DIAGNOSIS — F41.0 PANIC ATTACKS: ICD-10-CM

## 2022-06-10 DIAGNOSIS — R79.89 ELEVATED SERUM CREATININE: ICD-10-CM

## 2022-06-10 DIAGNOSIS — Z79.4 TYPE 2 DIABETES MELLITUS WITH HYPERGLYCEMIA, WITH LONG-TERM CURRENT USE OF INSULIN (HCC): ICD-10-CM

## 2022-06-10 DIAGNOSIS — R29.818 EXTRAPYRAMIDAL SYMPTOM: ICD-10-CM

## 2022-06-10 LAB
ANION GAP SERPL CALCULATED.3IONS-SCNC: 6 MMOL/L (ref 4–13)
BUN SERPL-MCNC: 15 MG/DL (ref 5–25)
CALCIUM SERPL-MCNC: 8.5 MG/DL (ref 8.4–10.2)
CHLORIDE SERPL-SCNC: 105 MMOL/L (ref 96–108)
CO2 SERPL-SCNC: 25 MMOL/L (ref 21–32)
CREAT SERPL-MCNC: 1.88 MG/DL (ref 0.6–1.3)
EST. AVERAGE GLUCOSE BLD GHB EST-MCNC: 148 MG/DL
FERRITIN SERPL-MCNC: 87 NG/ML (ref 8–388)
GFR SERPL CREATININE-BSD FRML MDRD: 39 ML/MIN/1.73SQ M
GLUCOSE P FAST SERPL-MCNC: 109 MG/DL (ref 65–99)
HBA1C MFR BLD: 6.8 %
IRON SATN MFR SERPL: 21 % (ref 20–50)
IRON SERPL-MCNC: 38 UG/DL (ref 65–175)
POTASSIUM SERPL-SCNC: 4.4 MMOL/L (ref 3.5–5.3)
SODIUM SERPL-SCNC: 136 MMOL/L (ref 135–147)
TIBC SERPL-MCNC: 183 UG/DL (ref 250–450)

## 2022-06-10 PROCEDURE — 83550 IRON BINDING TEST: CPT

## 2022-06-10 PROCEDURE — 99213 OFFICE O/P EST LOW 20 MIN: CPT | Performed by: PHYSICIAN ASSISTANT

## 2022-06-10 PROCEDURE — 83036 HEMOGLOBIN GLYCOSYLATED A1C: CPT

## 2022-06-10 PROCEDURE — 82728 ASSAY OF FERRITIN: CPT

## 2022-06-10 PROCEDURE — 36415 COLL VENOUS BLD VENIPUNCTURE: CPT

## 2022-06-10 PROCEDURE — 80048 BASIC METABOLIC PNL TOTAL CA: CPT

## 2022-06-10 PROCEDURE — 83540 ASSAY OF IRON: CPT

## 2022-06-10 RX ORDER — BENZTROPINE MESYLATE 2 MG/1
TABLET ORAL
Qty: 180 TABLET | Refills: 0 | Status: SHIPPED | OUTPATIENT
Start: 2022-06-10

## 2022-06-10 RX ORDER — BUSPIRONE HYDROCHLORIDE 15 MG/1
15 TABLET ORAL 2 TIMES DAILY
Qty: 180 TABLET | Refills: 0 | Status: SHIPPED | OUTPATIENT
Start: 2022-06-10 | End: 2022-09-08

## 2022-06-10 RX ORDER — CLONAZEPAM 1 MG/1
TABLET ORAL
Qty: 30 TABLET | Refills: 2 | Status: SHIPPED | OUTPATIENT
Start: 2022-06-10

## 2022-06-10 RX ORDER — TRAZODONE HYDROCHLORIDE 50 MG/1
TABLET ORAL
Qty: 90 TABLET | Refills: 0 | Status: SHIPPED | OUTPATIENT
Start: 2022-06-10

## 2022-06-10 RX ORDER — BUPROPION HYDROCHLORIDE 100 MG/1
100 TABLET ORAL DAILY
Qty: 90 TABLET | Refills: 0 | Status: SHIPPED | OUTPATIENT
Start: 2022-06-10 | End: 2022-09-08

## 2022-06-14 ENCOUNTER — OFFICE VISIT (OUTPATIENT)
Dept: MULTI SPECIALTY CLINIC | Facility: CLINIC | Age: 57
End: 2022-06-14

## 2022-06-14 VITALS
HEIGHT: 66 IN | BODY MASS INDEX: 50.62 KG/M2 | TEMPERATURE: 98.1 F | HEART RATE: 99 BPM | SYSTOLIC BLOOD PRESSURE: 132 MMHG | DIASTOLIC BLOOD PRESSURE: 85 MMHG | WEIGHT: 315 LBS

## 2022-06-14 DIAGNOSIS — I12.9 BENIGN HYPERTENSION WITH CKD (CHRONIC KIDNEY DISEASE) STAGE III (HCC): ICD-10-CM

## 2022-06-14 DIAGNOSIS — N18.30 TYPE 2 DIABETES MELLITUS WITH STAGE 3 CHRONIC KIDNEY DISEASE AND HYPERTENSION (HCC): Primary | ICD-10-CM

## 2022-06-14 DIAGNOSIS — I12.9 TYPE 2 DIABETES MELLITUS WITH STAGE 3 CHRONIC KIDNEY DISEASE AND HYPERTENSION (HCC): Primary | ICD-10-CM

## 2022-06-14 DIAGNOSIS — E11.22 TYPE 2 DIABETES MELLITUS WITH STAGE 3 CHRONIC KIDNEY DISEASE AND HYPERTENSION (HCC): Primary | ICD-10-CM

## 2022-06-14 DIAGNOSIS — N18.30 BENIGN HYPERTENSION WITH CKD (CHRONIC KIDNEY DISEASE) STAGE III (HCC): ICD-10-CM

## 2022-06-14 DIAGNOSIS — E78.2 MIXED HYPERLIPIDEMIA: ICD-10-CM

## 2022-06-14 PROCEDURE — 99214 OFFICE O/P EST MOD 30 MIN: CPT | Performed by: PHYSICIAN ASSISTANT

## 2022-06-14 NOTE — PROGRESS NOTES
Patient Progress Note      Chief Complaint   Patient presents with    Follow-up     Dm2        Referring Provider  Edwin Daniel Md  [facility],  [address] [place] [address]     History of Present Illness:   Alexey Spring is a 55 y o  male with a history of type 2 diabetes with long term use of insulin  Diabetes course has been stable  Complications of DM: CKD, neuropathy  Denies recent illness or hospitalizations  Denies any issues with his current regimen  Home glucose monitoring: are performed once a day, usually in the evenings a couple a hours after dinner     No log or meter today   Readings a few hours after dinner are in the high 100s, > 170 mg/dl  Current regimen: Lantus 50 units BID, Humalog 28 units TID with meals (usually only taking it with dinner because he eats 1 meal), Trulicity 1 5 mg weekly  compliant most of the time, denies any side effects from medications  Injects in: abdomen  Rotates sites: Yes  Hypoglycemic episodes: No, rare  H/o of hypoglycemia causing hospitalization or Intervention such as glucagon injection or ambulance call : Yes  Hypoglycemia symptoms: jitteriness, lightheadedness  Treatment of hypoglycemia: orange juice     Medic alert tag: recommended: Yes     Diabetes education: None   Diet: 1 meal per day, 1-2 snacks per day  Timing of meals is predictable  Diabetic diet compliance: noncompliant much of the time  He drinks regular iced tea, Koolaid, soda  Activity: Daily activity is predictable: Yes  No routine exercise  Ophthamology: eye exam UTD, August 2021  Podiatry: foot exam UTD, August 2021     Has hypertension: on ACE inhibitor/ARB, compliant most of the time  Has hyperlipidemia: on statin - tolerating well, no myalgias  compliant most of the time, denies any side effects from medications    Thyroid disorders: No  History of pancreatitis: No    Patient Active Problem List   Diagnosis    Bipolar affective disorder, current episode mixed ([facility] [address] )    Panic attacks    Morbid obesity with BMI of 50 0-59 9, adult (HCC)    Flat foot    Diabetic polyneuropathy (HCC)    Benign hypertension with CKD (chronic kidney disease) stage III (HCC)    Allergic rhinitis    GERD (gastroesophageal reflux disease)    Insomnia    Hiatal hernia    Lower esophageal ring ([place])    Generalized anxiety disorder    DAISY (obstructive sleep apnea)    Stage 3 chronic kidney disease (HCC)    Type 2 diabetes mellitus with stage 3 chronic kidney disease and hypertension (HCC)    Persistent proteinuria    Anemia, unspecified    Toenail deformity    Hyperlipidemia    Extrapyramidal symptom    Vitamin D deficiency      Past Medical History:   Diagnosis Date    ADHD, adult residual type     Anxiety     Anxiety     Bipolar 1 disorder (HCC)     Cataract     Left eye    Depression     Depression     Diabetes mellitus ([facility])     blood sugar 167 on admission    Equinus deformity of foot     GERD (gastroesophageal reflux disease)     Homicidal ideations     Hyperlipidemia     Hypertension     Microalbuminuria     Morbid obesity (HCC)     Neuropathy     Shortness of breath     Sleep apnea     CPAP at bedtime    Sleep apnea     Stroke ([facility] [address] )     Suicidal intent       Past Surgical History:   Procedure Laterality Date    CATARACT EXTRACTION      CATARACT EXTRACTION      HAND SURGERY Right     OTHER SURGICAL HISTORY      REPAIR OF SUPERFICIAL WOUND FACE    NH ESOPHAGOGASTRODUODENOSCOPY TRANSORAL DIAGNOSTIC N/A 6/14/2018    Procedure: ESOPHAGOGASTRODUODENOSCOPY (EGD); Surgeon: Rafal Duke MD;  Location: [facility]; Service: Gastroenterology    NH ESOPHAGOGASTRODUODENOSCOPY TRANSORAL DIAGNOSTIC N/A 9/12/2018    Procedure: EGD AND COLONOSCOPY;  Surgeon: Rafal Duke MD;  Location: [facility];   Service: Gastroenterology      Family History   Problem Relation Age of Onset    Diabetes Mother     Alcohol abuse Father     Diabetes Father     Hypertension Father    [address] Lung cancer Father     Stroke Father     Cancer Father         lung    Alcohol abuse Sister     Depression Sister     Lymphoma Sister     Alcohol abuse Family     Alcohol abuse Sister     Alcohol abuse Sister     Cancer Sister     Heart disease Neg Hx     Thyroid disease Neg Hx      Social History     Tobacco Use    Smoking status: Former Smoker     Types: Cigarettes     Quit date:      Years since quittin 4    Smokeless tobacco: Former User     Types: Chew    Tobacco comment: pt "Quit after approx over 25 years ago"   Substance Use Topics    Alcohol use: Yes     Comment: rarely     Allergies   Allergen Reactions    Pollen Extract Nasal Congestion    Tetanus Toxoid Swelling         Current Outpatient Medications:     amLODIPine (NORVASC) 5 mg tablet, TAKE 1 TABLET(5 MG) BY MOUTH DAILY, Disp: 90 tablet, Rfl: 3    ammonium lactate (LAC-HYDRIN) 12 % cream, Apply topically as needed for dry skin, Disp: 385 g, Rfl: 0    atorvastatin (LIPITOR) 20 mg tablet, Take 1 tablet (20 mg total) by mouth in the morning , Disp: 90 tablet, Rfl: 1    benztropine (COGENTIN) 2 mg tablet, Take 1 tab po bid, Disp: 180 tablet, Rfl: 0    Blood Glucose Monitoring Suppl (FREESTYLE LITE) ANTONIA, by Does not apply route 3 (three) times a day, Disp: 1 each, Rfl: 0    Blood Glucose Monitoring Suppl (FreeStyle Lite) w/Device KIT, Use as instructed 4 times a day, Disp: 1 kit, Rfl: 0    buPROPion (WELLBUTRIN) 100 mg tablet, Take 1 tablet (100 mg total) by mouth daily, Disp: 90 tablet, Rfl: 0    busPIRone (BUSPAR) 15 mg tablet, Take 1 tablet (15 mg total) by mouth 2 (two) times a day, Disp: 180 tablet, Rfl: 0    clonazePAM (KlonoPIN) 1 mg tablet, Take 1 tab po qd prn anxiety or panic attacks, Disp: 30 tablet, Rfl: 2    FREESTYLE LITE test strip, CHECK BLOOD SUGARS FOUR TIMES DAILY, Disp: 400 strip, Rfl: 3    gabapentin (NEURONTIN) 300 mg capsule, TAKE 1 CAPSULE(300 MG) BY MOUTH TWICE DAILY, Disp: 180 capsule, Rfl: 3    glucose monitoring kit (FREESTYLE) monitoring kit, Use 1 each 4 (four) times a day Fine to substitute other brand if not covered by insurance, Disp: 1 each, Rfl: 0    HumaLOG KwikPen 100 units/mL injection pen, INJECT 28 UNITS THREE TIMES DAILY WITH MEALS PLUS SCALE UP  UNITS DAILY, Disp: 105 mL, Rfl: 0    insulin glargine (Lantus SoloStar) 100 units/mL injection pen, Inject 50u twice a day , Disp: 15 mL, Rfl: 3    Insulin Pen Needle 31G X 8 MM MISC, Check sugars three times a day, Disp: 100 each, Rfl: 1    INSULIN SYRINGE  5CC/29G 29G X 1/2" 0 5 ML MISC, by Does not apply route, Disp: , Rfl:     Lancets (FREESTYLE) lancets, USE THREE TIMES DAILY AS DIRECTED, Disp: 300 each, Rfl: 0    lisinopril-hydrochlorothiazide (PRINZIDE,ZESTORETIC) 20-25 MG per tablet, TAKE 1 TABLET BY MOUTH DAILY, Disp: 90 tablet, Rfl: 1    lurasidone (Latuda) 120 mg tablet, Take 1 tablet (120 mg total) by mouth daily with dinner, Disp: 90 tablet, Rfl: 0    omeprazole (PriLOSEC) 20 mg delayed release capsule, TAKE 1 CAPSULE(20 MG) BY MOUTH DAILY, Disp: 90 capsule, Rfl: 3    traZODone (DESYREL) 50 mg tablet, Take 1/2 to 1 tab po qhs prn insomnia, Disp: 90 tablet, Rfl: 0    Trulicity 1 5 GL/4 4UT SOPN, INJECT ONCE WEEKLY, Disp: 6 mL, Rfl: 1    ascorbic acid (VITAMIN C) 250 mg tablet, Take 1 tablet (250 mg total) by mouth daily (Patient not taking: No sig reported), Disp: 90 tablet, Rfl: 3    atorvastatin (LIPITOR) 10 mg tablet, TAKE 1 TABLET(10 MG) BY MOUTH DAILY (Patient not taking: Reported on 6/14/2022), Disp: 90 tablet, Rfl: 3    cholecalciferol (VITAMIN D3) 1,000 units tablet, Take 1 tablet (1,000 Units total) by mouth in the morning   (Patient not taking: No sig reported), Disp: 30 tablet, Rfl: 0    Continuous Blood Gluc  (FreeStyle [place]rence 14 Day [place]) ANTONIA, Use  to check BG at least 4 times a day (Patient not taking: No sig reported), Disp: 1 each, Rfl: 0    Continuous Blood Gluc Sensor (FreeStyle [place]rence 14 Day Sensor) [facility], Apply sensor every 14 days to check BG at least 4 times a day (Patient not taking: No sig reported), Disp: 2 each, Rfl: 5    dextromethorphan-guaiFENesin (ROBITUSSIN DM)  mg/5 mL syrup, Take 5 mL by mouth 4 (four) times a day as needed for cough or congestion (Patient not taking: No sig reported), Disp: 473 mL, Rfl: 1    ferrous sulfate (EQL Iron Supplement Therapy) 325 (65 Fe) mg tablet, Take 1 tablet (325 mg total) by mouth daily with breakfast (Patient not taking: No sig reported), Disp: 90 tablet, Rfl: 2    fluticasone (FLONASE) 50 mcg/act nasal spray, 1 spray into each nostril daily (Patient not taking: No sig reported), Disp: 18 2 mL, Rfl: 1    sodium chloride (OCEAN) 0 65 % nasal spray, 1 spray into each nostril daily at bedtime (Patient not taking: Reported on 6/14/2022), Disp: 104 mL, Rfl: 2  Review of Systems   Constitutional: Negative for activity change, appetite change, fatigue and unexpected weight change  HENT: Positive for trouble swallowing  Eyes: Positive for visual disturbance  Respiratory: Negative for shortness of breath  Cardiovascular: Negative for chest pain and palpitations  Gastrointestinal: Negative for constipation and diarrhea  Endocrine: Negative for polydipsia and polyuria  Musculoskeletal: Negative  Skin: Negative for wound  Neurological: Negative for numbness  Psychiatric/Behavioral: Negative  Physical Exam:  Body mass index is 51 kg/m²  /85 (BP Location: Right arm, Patient Position: Sitting, Cuff Size: Large)   Pulse 99   Temp 98 1 °F (36 7 °C) (Temporal)   Ht 5' 6" (1 676 m)   Wt (!) 143 kg (316 lb)   BMI 51 00 kg/m²    Wt Readings from Last 3 Encounters:   06/14/22 (!) 143 kg (316 lb)   05/25/22 (!) 141 kg (311 lb)   05/23/22 (!) 144 kg (316 lb 6 4 oz)       Physical Exam  Vitals and nursing note reviewed  Constitutional:       Appearance: He is well-developed     HENT:      Head: Normocephalic  Eyes:      General: No scleral icterus  Pupils: Pupils are equal, round, and reactive to light  Neck:      Thyroid: No thyromegaly  Cardiovascular:      Rate and Rhythm: Normal rate and regular rhythm  Pulses:           Radial pulses are 2+ on the right side and 2+ on the left side  Heart sounds: No murmur heard  Pulmonary:      Effort: Pulmonary effort is normal  No respiratory distress  Breath sounds: Normal breath sounds  No wheezing  Musculoskeletal:      Cervical back: Neck supple  Skin:     General: Skin is warm and dry  Neurological:      Mental Status: He is alert  Patient's shoes and socks were not removed  Labs:   Component      Latest Ref Rng & Units 4/6/2022 6/10/2022   Sodium      135 - 147 mmol/L 136 136   Potassium      3 5 - 5 3 mmol/L 4 3 4 4   Chloride      96 - 108 mmol/L 101 105   CO2      21 - 32 mmol/L 28 25   Anion Gap      4 - 13 mmol/L 7 6   BUN      5 - 25 mg/dL 19 15   Creatinine      0 60 - 1 30 mg/dL 2 10 (H) 1 88 (H)   GLUCOSE FASTING      65 - 99 mg/dL 139 (H) 109 (H)   Calcium      8 4 - 10 2 mg/dL 8 7 8 5   CORRECTED CALCIUM      8 3 - 10 1 mg/dL 9 6    AST      5 - 45 U/L 14    ALT      12 - 78 U/L 15    Alkaline Phosphatase      46 - 116 U/L 95    Total Protein      6 4 - 8 2 g/dL 7 3    Albumin      3 5 - 5 0 g/dL 2 9 (L)    TOTAL BILIRUBIN      0 20 - 1 00 mg/dL 0 20    eGFR      ml/min/1 73sq m 34 39   Cholesterol      See Comment mg/dL 79    Triglycerides      See Comment mg/dL 57    HDL      >=40 mg/dL 37 (L)    LDL Calculated      0 - 100 mg/dL 31    EXT Creatinine Urine      mg/dL 46 8    MICROALBUM ,U,RANDOM      0 0 - 20 0 mg/L 115 0 (H)    MICROALBUMIN/CREATININE RATIO      0 - 30 mg/g creatinine 246 (H)    Hemoglobin A1C      Normal 3 8-5 6%; PreDiabetic 5 7-6 4%;  Diabetic >=6 5%; Glycemic control for adults with diabetes <7 0% % 7 1 (H) 6 8 (H)   eAG, EST AVG Glucose      mg/dl 157 148     Plan:    Georgena Monday was seen today for follow-up  Diagnoses and all orders for this visit:    Type 2 diabetes mellitus with stage 3 chronic kidney disease and hypertension ([facility] [address] )  HGA1C 6 8%  Improved  Treatment regimen: its possible A1c is unreliable due to CKD  Patients diet consists of sugary beverages  He has been advised to drink more water and use diet drinks  Discussed if he eliminates sugary beverages his insulin requirement may decrease  Fasting glucose on labs was controlled at 109 mg/dl  Advised to complete log and send in 1-2 weeks  For now continue current treatment  Will check fructosamine level as A1C may be unreliable  Discussed intensive insulin regimen does increase risk for hypoglycemia  Episodes of hypoglycemia can lead to permanent disability and death  Discussed risks/complications associated with uncontrolled diabetes  Advised to adhere to diabetic diet, and recommended staying active/exercising routinely as tolerated  Keep carbohydrates consistent to limit blood glucose fluctuations  Advised to call if blood sugars less than 70 mg/dl or over 300 mg/dl  Check blood glucose 3+ times a day  Discussed symptoms and treatment of hypoglycemia  Recommended routine follow-up with podiatry and ophthalmology  Send log in 1-2 weeks  Ordered blood work to complete prior to next visit  -     Hemoglobin A1C; Future  -     Basic metabolic panel; Future  -     Fructosamine; Future    Mixed hyperlipidemia  LDL 31  Continue statin therapy    Benign hypertension with CKD (chronic kidney disease) stage III (HCC)  GFR 39  Managed by nephrology  Blood pressure adequately controlled, continue current treatment  -     Basic metabolic panel; Future               Discussed with the patient diagnosis and treatment and all questions fully answered  He will call me if any problems arise      Counseled patient on diagnostic results, prognosis, risk and benefit of treatment options, instruction for management, importance of treatment compliance, risk factor reduction and impressions      Katherine Bo PA-C      [facility], [place] ENDOCRINOLOGY

## 2022-06-14 NOTE — PATIENT INSTRUCTIONS
Hypoglycemia instructions   Toi Rise  6/14/2022  [id number]    Low Blood Sugar    Steps to treat low blood sugar  1  Test blood sugar if you have symptoms of low blood sugar:   Low Blood Sugar Symptoms:  o Sweaty  o Dizzy  o Rapid heartbeat  o Shaky  o Bad mood  o Hungry      2  Treat blood sugar less than 70 with 15 grams of fast-acting carbohydrate:   Examples of 15 grams Fast-Acting Carbohydrate:  o 4 oz juice  o 4 oz regular soda  o 3-4 glucose tablets (chew)  o 3-4 hard candies (chew)          3  Wait 15 minutes and test your blood sugar again     4   If blood sugar is less than 100, repeat steps 2-3     5  When your blood sugar is 100 or more, eat a snack if it will be longer than one hour until your next meal  The snack should be 15 grams of carbohydrate and a protein:   Examples of snacks:  o ½ sandwich  o 6 crackers with cheese  o Piece of fruit with cheese or peanut butter  o 6 crackers with peanut butter

## 2022-06-17 DIAGNOSIS — Z12.11 ENCOUNTER FOR SCREENING COLONOSCOPY: Primary | ICD-10-CM

## 2022-07-11 ENCOUNTER — TELEPHONE (OUTPATIENT)
Dept: PSYCHIATRY | Facility: CLINIC | Age: 57
End: 2022-07-11

## 2022-07-11 NOTE — TELEPHONE ENCOUNTER
Writer left a message to cx todays appt on 7/11/22 with Letty Flower due to provider not in office today follow -up 8/1/22   Thank you

## 2022-08-01 ENCOUNTER — SOCIAL WORK (OUTPATIENT)
Dept: BEHAVIORAL/MENTAL HEALTH CLINIC | Facility: CLINIC | Age: 57
End: 2022-08-01
Payer: MEDICARE

## 2022-08-01 DIAGNOSIS — F31.62 BIPOLAR DISORDER, CURRENT EPISODE MIXED, MODERATE (HCC): Primary | ICD-10-CM

## 2022-08-01 DIAGNOSIS — F41.0 PANIC ATTACKS: ICD-10-CM

## 2022-08-01 DIAGNOSIS — F41.1 GENERALIZED ANXIETY DISORDER: ICD-10-CM

## 2022-08-01 DIAGNOSIS — G47.00 INSOMNIA, UNSPECIFIED TYPE: ICD-10-CM

## 2022-08-01 PROCEDURE — 90834 PSYTX W PT 45 MINUTES: CPT | Performed by: SOCIAL WORKER

## 2022-08-01 NOTE — PSYCH
Psychotherapy Provided: Individual Psychotherapy 45 minutes     Length of time in session: 45 minutes, follow up in 2 week    Bipolar mixed moderate,THERESE    Goals addressed in session: Goal 1, Goal 2 and Goal 3      Pain:      moderate to severe    0    Current suicide risk : Low     Data: Cash Candelaria shared they bought a pool  His depression is low but he is anxious  Cash Candelaria is upset with the fact that they now have to eat a certain way due to his girlfriends father's special diet  Assessment: He denies suicidal /homicidal ideation, plan or intent  He is not depressed but is anxious in dealing with whom he refers to as his father in law  I provide support and CBT strategies  Plan: Continue to skill build in distress tolerance  Behavioral Health Treatment Plan [facility]: Diagnosis and Treatment Plan explained to Jeremy Rodriguez relates understanding diagnosis and is agreeable to Treatment Plan   Yes

## 2022-08-02 NOTE — BH TREATMENT PLAN
Jhon Kussmaul                 Treatment Plan Tracking: Please note  The plan update was due 7/28  However Orlando's last appointment was due 06/08/2022  Today 8/1 was the first time he was back so we did it today  1965       Date of Initial Treatment Plan: 05/07/2018   Date of Current Treatment Plan: 08/01/2022    Treatment Plan Number : Update    Strengths/Personal Resources for Self Care: Kind, Caring, passionate for life    Diagnosis:   1  Bipolar disorder, current episode mixed, moderate ([address] )     2  Generalized anxiety disorder     3  Insomnia, unspecified type     4  Panic attacks         Area of Needs: Please see below      Long Term Goal 1: I still need to continue to manage my depression and my anxiety    Target Date: 01/25/2023  Completion Date: TBD         Short Term Objectives for Goal 1: Mindfulness, CBT and solution focused therapy strategies  Long Term Goal 2: I need to cope with my problematic great niece I am raising and a difficult father in law that stresses me out  Target Date: 01/25/2023  Completion Date: TBD    Short Term Objectives for Goal 2: parenting skills, dealing with difficult elders, boundary setting and keeping my expectations in check  Long Term Goal # 3: N/A     Target Date: N/A  Completion Date: N/A    Short Term Objectives for Goal 3: N/A    GOAL 1: Modality: Individual 2x per month   Completion Date tbd    GOAL 2: Modality: Individual 2x per month   Completion Date tbd     GOAL 3: Modality: Individual n/ax per month   Completion Date n/a      Behavioral Health Treatment Plan [facility]: Diagnosis and Treatment Plan explained to Logan Navjot relates understanding diagnosis and is agreeable to Treatment Plan  Client Comments : Please share your thoughts, feelings, need and/or experiences regarding your treatment plan: My therapist and I will work as a team to help me progress on my goals

## 2022-08-13 DIAGNOSIS — Z79.4 TYPE 2 DIABETES MELLITUS WITH HYPERGLYCEMIA, WITH LONG-TERM CURRENT USE OF INSULIN (HCC): ICD-10-CM

## 2022-08-13 DIAGNOSIS — E11.65 TYPE 2 DIABETES MELLITUS WITH HYPERGLYCEMIA, WITH LONG-TERM CURRENT USE OF INSULIN (HCC): ICD-10-CM

## 2022-08-15 RX ORDER — DULAGLUTIDE 1.5 MG/.5ML
INJECTION, SOLUTION SUBCUTANEOUS
Qty: 6 ML | Refills: 1 | Status: SHIPPED | OUTPATIENT
Start: 2022-08-15

## 2022-08-22 ENCOUNTER — OFFICE VISIT (OUTPATIENT)
Dept: MULTI SPECIALTY CLINIC | Facility: CLINIC | Age: 57
End: 2022-08-22

## 2022-08-22 VITALS
TEMPERATURE: 98.1 F | DIASTOLIC BLOOD PRESSURE: 70 MMHG | WEIGHT: 313 LBS | HEART RATE: 108 BPM | BODY MASS INDEX: 50.3 KG/M2 | SYSTOLIC BLOOD PRESSURE: 103 MMHG | HEIGHT: 66 IN

## 2022-08-22 DIAGNOSIS — G63 POLYNEUROPATHY ASSOCIATED WITH UNDERLYING DISEASE (HCC): ICD-10-CM

## 2022-08-22 DIAGNOSIS — N18.30 TYPE 2 DIABETES MELLITUS WITH STAGE 3 CHRONIC KIDNEY DISEASE AND HYPERTENSION (HCC): Primary | ICD-10-CM

## 2022-08-22 DIAGNOSIS — I12.9 TYPE 2 DIABETES MELLITUS WITH STAGE 3 CHRONIC KIDNEY DISEASE AND HYPERTENSION (HCC): Primary | ICD-10-CM

## 2022-08-22 DIAGNOSIS — R60.0 BILATERAL LEG EDEMA: ICD-10-CM

## 2022-08-22 DIAGNOSIS — E11.22 TYPE 2 DIABETES MELLITUS WITH STAGE 3 CHRONIC KIDNEY DISEASE AND HYPERTENSION (HCC): Primary | ICD-10-CM

## 2022-08-22 DIAGNOSIS — B35.1 ONYCHOMYCOSIS: ICD-10-CM

## 2022-08-22 PROCEDURE — 99213 OFFICE O/P EST LOW 20 MIN: CPT | Performed by: PODIATRIST

## 2022-08-22 NOTE — PROGRESS NOTES
[facility]  Sedrick Mortensen 65 y o  male MRN: [id number]  Encounter: [id number]      Assessment/Plan        Diagnoses and all orders for this visit:    Type 2 diabetes mellitus with stage 3 chronic kidney disease and hypertension (HCC)    Bilateral leg edema    Polyneuropathy associated with underlying disease ([address] )    Onychomycosis       Plan:   Patient was seen/examined  All questions and concerns addressed   Mycotic nails x10 debrided to normal length and thickness using large nail nipper, without incident and tolerated well by patient   Encouraged the patient to use compression dressings consisting of either ACE bandage or compression stockings in an effort to reduce lower extremity edema   Educated the patient on importance of strict blood glucose control, daily foot exams, and wearing proper shoe gear at all times in an effort to prevent diabetic foot complications   Last A1c Reviewed: 6 8 (6/10/2022)   Yearly diabetic foot exam completed today, patient remains low risk   RTC in 3 month(s)          Nail debridement     Date/Time 8/22/2022 2:04 PM     Performed by  Stanly Dubin, DPM     Authorized by Stanly Dubin, DPM      Universal Protocol   Consent: Verbal consent obtained  Procedure Details   Procedure Notes: Mycotic nails x10 debrided to normal length and thickness utilizing large nail nipper  No immediate complications observed  Tolerated well by the patient           Dr Jordan Murcia was present during this entire procedure  History of Present Illness     HPI:  Julien Moser is a 64 y/o diabetic male presenting for diabetic foot care and nail debridement  He states that his nails are elongated and thick, and make shoegear painful  He states that his blood sugar typically runs less than 200 when he checks it daily  He admits to occasional numbness and tingling in bilateral lower extremities  He denies any open wounds or lesions   No other pedal complaints at this time       Review of Systems   Constitutional: Negative  HENT: Negative  Eyes: Negative  Respiratory: Negative  Cardiovascular: Negative  Gastrointestinal: Negative  Musculoskeletal: negative  Skin: long and thick toe nails  Neurological: Negative  Historical Information   Past Medical History:   Diagnosis Date    ADHD, adult residual type     Anxiety     Anxiety     Bipolar 1 disorder (HCC)     Cataract     Left eye    Depression     Depression     Diabetes mellitus (HCC)     blood sugar 167 on admission    Equinus deformity of foot     GERD (gastroesophageal reflux disease)     Homicidal ideations     Hyperlipidemia     Hypertension     Microalbuminuria     [facility]bid [facility])     Neuropathy     Shortness of breath     Sleep apnea     CPAP at bedtime    Sleep apnea     Stroke ([facility] )     Suicidal intent      Past Surgical History:   Procedure Laterality Date    CATARACT EXTRACTION      CATARACT EXTRACTION      HAND SURGERY Right     OTHER SURGICAL HISTORY      REPAIR OF SUPERFICIAL WOUND FACE    [place] ESOPHAGOGASTRODUODENOSCOPY TRANSORAL DIAGNOSTIC N/A 2018    Procedure: ESOPHAGOGASTRODUODENOSCOPY (EGD); Surgeon: Ana Baez MD;  Location: [facility]; Service: Gastroenterology    [place] ESOPHAGOGASTRODUODENOSCOPY TRANSORAL DIAGNOSTIC N/A 2018    Procedure: EGD AND COLONOSCOPY;  Surgeon: Ana Baez MD;  Location: [facility];   Service: Gastroenterology     Social History   Social History     Substance and Sexual Activity   Alcohol Use Yes    Comment: rarely     Social History     Substance and Sexual Activity   Drug Use Not Currently    Comment: quit 20 years ago     Social History     Tobacco Use   Smoking Status Former Smoker    Types: Cigarettes    Quit date: 5    Years since quittin 6   Smokeless Tobacco Former User    Types: Chew   Tobacco Comment    pt "Quit after approx over 25 years ago"     Family History:   Family History   Problem Relation Age of Onset    Diabetes Mother     Alcohol abuse Father     Diabetes Father     Hypertension Father     Lung cancer Father     Stroke Father     Cancer Father         lung    Alcohol abuse Sister     Depression Sister     Lymphoma Sister     Alcohol abuse Family     Alcohol abuse Sister     Alcohol abuse Sister     Cancer Sister     Heart disease Neg Hx     Thyroid disease Neg Hx        Meds/Allergies   (Not in a hospital admission)    Allergies   Allergen Reactions    Pollen Extract Nasal Congestion    Tetanus Toxoid Swelling       Objective     Current Vitals:   Blood Pressure: 103/70 (08/22/22 1321)  Pulse: (!) 108 (08/22/22 1321)  Temperature: 98 1 °F (36 7 °C) (08/22/22 1321)  Temp Source: Temporal (08/22/22 1321)  Height: 5' 6" (167 6 cm) (08/22/22 1321)  Weight - Scale: (!) 142 kg (313 lb) (08/22/22 1321)        /70 (BP Location: Right arm, Patient Position: Sitting, Cuff Size: Large)   Pulse (!) 108   Temp 98 1 °F (36 7 °C) (Temporal)   Ht 5' 6" (1 676 m)   Wt (!) 142 kg (313 lb)   BMI 50 52 kg/m²       Lower Extremity Exam:    Vascular: Right foot DP/PT +1                   Left foot DP/PT +1                   There is +2 lower extremity edema in bilateral lower extremities  Capillary refill < 3 seconds  Musculoskeletal: There is 5/5 strength throughout the bilateral lower extremity  limited ankle range of motion with well maintained subtalar range of motion  There is no tenderness over the bilateral foot and ankle  Adductovarus deformity of bilateral 5th digit           B/L, Hallux limitus is present  Equinus is present      Neurological: Sensation to 5 [address] [place] nylon filament: intact bilaterally      Vibratory sense to distal Foot  intact bilaterally      Sharp/Dull sense is intact bilaterally    Dermatology: Skin Condition:  mobility and turgor normal, no abnormal pigmentation, no eczematous changes, no evidence of bleeding or bruising and no lesions noted     There is not evidence of macerated tissue between toe spaces  Nail Exam: onychomycosis of the toenails  Nails are elongated and thickened with yellow discoloration and subungual debris  Open ulcerations: No     Calluses: No      Patient's shoes and socks removed  Right Foot/Ankle   Right Foot Inspection  Skin Exam: skin normal and skin intact  No dry skin, no warmth, no callus, no erythema, no maceration, no abnormal color, no pre-ulcer, no ulcer and no callus  Toe Exam: ROM and strength within normal limits  Sensory   Vibration: intact  Proprioception: intact  Monofilament testing: diminished    Vascular  Capillary refills: < 3 seconds  The right DP pulse is 1+  The right PT pulse is 1+  Left Foot/Ankle  Left Foot Inspection  Skin Exam: skin normal and skin intact  No dry skin, no warmth, no erythema, no maceration, normal color, no pre-ulcer, no ulcer and no callus  Toe Exam: ROM and strength within normal limits  Sensory   Vibration: intact  Proprioception: intact  Monofilament testing: diminished    Vascular  Capillary refills: < 3 seconds  The left DP pulse is 1+  The left PT pulse is 1+       Assign Risk Category  No loss of protective sensation  No weak pulses  Risk: 0
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
## 2022-08-29 ENCOUNTER — OFFICE VISIT (OUTPATIENT)
Dept: PSYCHIATRY | Facility: CLINIC | Age: 57
End: 2022-08-29
Payer: MEDICARE

## 2022-08-29 VITALS — WEIGHT: 303 LBS | HEIGHT: 66 IN | BODY MASS INDEX: 48.7 KG/M2

## 2022-08-29 DIAGNOSIS — R29.818 EXTRAPYRAMIDAL SYMPTOM: ICD-10-CM

## 2022-08-29 DIAGNOSIS — F31.61 BIPOLAR DISORDER, CURRENT EPISODE MIXED, MILD (HCC): ICD-10-CM

## 2022-08-29 DIAGNOSIS — F41.0 PANIC ATTACKS: ICD-10-CM

## 2022-08-29 DIAGNOSIS — G47.00 INSOMNIA, UNSPECIFIED TYPE: ICD-10-CM

## 2022-08-29 DIAGNOSIS — F41.1 GENERALIZED ANXIETY DISORDER: Primary | Chronic | ICD-10-CM

## 2022-08-29 PROCEDURE — 99213 OFFICE O/P EST LOW 20 MIN: CPT | Performed by: PHYSICIAN ASSISTANT

## 2022-08-29 RX ORDER — TRAZODONE HYDROCHLORIDE 50 MG/1
TABLET ORAL
Qty: 90 TABLET | Refills: 0 | Status: SHIPPED | OUTPATIENT
Start: 2022-08-29

## 2022-08-29 RX ORDER — BENZTROPINE MESYLATE 2 MG/1
TABLET ORAL
Qty: 180 TABLET | Refills: 0 | Status: SHIPPED | OUTPATIENT
Start: 2022-08-29

## 2022-08-29 RX ORDER — BUPROPION HYDROCHLORIDE 100 MG/1
100 TABLET ORAL DAILY
Qty: 90 TABLET | Refills: 0 | Status: SHIPPED | OUTPATIENT
Start: 2022-08-29 | End: 2022-11-27

## 2022-08-29 RX ORDER — CLONAZEPAM 1 MG/1
TABLET ORAL
Qty: 30 TABLET | Refills: 2 | Status: SHIPPED | OUTPATIENT
Start: 2022-08-29

## 2022-08-29 RX ORDER — BUSPIRONE HYDROCHLORIDE 15 MG/1
15 TABLET ORAL 2 TIMES DAILY
Qty: 180 TABLET | Refills: 0 | Status: SHIPPED | OUTPATIENT
Start: 2022-08-29 | End: 2022-11-27

## 2022-09-06 ENCOUNTER — SOCIAL WORK (OUTPATIENT)
Dept: BEHAVIORAL/MENTAL HEALTH CLINIC | Facility: CLINIC | Age: 57
End: 2022-09-06
Payer: MEDICARE

## 2022-09-06 DIAGNOSIS — F31.62 BIPOLAR DISORDER, CURRENT EPISODE MIXED, MODERATE (HCC): Primary | ICD-10-CM

## 2022-09-06 DIAGNOSIS — G47.00 INSOMNIA, UNSPECIFIED TYPE: ICD-10-CM

## 2022-09-06 DIAGNOSIS — F41.1 GENERALIZED ANXIETY DISORDER: ICD-10-CM

## 2022-09-06 DIAGNOSIS — F41.0 PANIC ATTACKS: ICD-10-CM

## 2022-09-06 PROCEDURE — 90834 PSYTX W PT 45 MINUTES: CPT | Performed by: SOCIAL WORKER

## 2022-09-06 NOTE — PSYCH
Psychotherapy Provided: Individual Psychotherapy 45 minutes     Length of time in session: 45 minutes, follow up in 2 week    Bipolar mixed moderate, THERESE, Panic  Goals addressed in session: Goal 1, Goal 2 and Goal 3      Pain:      moderate to severe    0    Current suicide risk : Low     Data: Patrick Davalos arrived for his session  He brought his grandson  He discussed how his father in law is starting to get dementia  His neighbor alton called the police on his 3and 9year old grandchildren and 15year old niece for so called making noise  They are children and the neighbors told him that there is nothing we can do  Assessment: Patrick Davalos denies suicidal/homicidal ideation, plan or intent  He denies depression but claims his anxiety is elevated  His father in law is starting to upset Patrick Davalos and his wife  He claims his 15year old niece who is now in 8th grade is doing better  We continue to work on mindfulness, CBT and solution focused therapy strategies  Plan: Continue to skill build in distress tolerance  Behavioral Health Treatment Plan [facility]: Diagnosis and Treatment Plan explained to Monisha Hale relates understanding diagnosis and is agreeable to Treatment Plan   Yes

## 2022-09-13 ENCOUNTER — APPOINTMENT (OUTPATIENT)
Dept: LAB | Facility: CLINIC | Age: 57
End: 2022-09-13
Payer: MEDICARE

## 2022-09-13 DIAGNOSIS — N18.30 TYPE 2 DIABETES MELLITUS WITH STAGE 3 CHRONIC KIDNEY DISEASE AND HYPERTENSION (HCC): ICD-10-CM

## 2022-09-13 DIAGNOSIS — E11.22 TYPE 2 DIABETES MELLITUS WITH STAGE 3 CHRONIC KIDNEY DISEASE AND HYPERTENSION (HCC): ICD-10-CM

## 2022-09-13 DIAGNOSIS — I12.9 TYPE 2 DIABETES MELLITUS WITH STAGE 3 CHRONIC KIDNEY DISEASE AND HYPERTENSION (HCC): ICD-10-CM

## 2022-09-13 LAB
ALBUMIN SERPL BCP-MCNC: 3.6 G/DL (ref 3.5–5)
ALP SERPL-CCNC: 83 U/L (ref 34–104)
ALT SERPL W P-5'-P-CCNC: 9 U/L (ref 7–52)
ANION GAP SERPL CALCULATED.3IONS-SCNC: 4 MMOL/L (ref 4–13)
AST SERPL W P-5'-P-CCNC: 11 U/L (ref 13–39)
BILIRUB SERPL-MCNC: 0.4 MG/DL (ref 0.2–1)
BUN SERPL-MCNC: 15 MG/DL (ref 5–25)
CALCIUM SERPL-MCNC: 8.9 MG/DL (ref 8.4–10.2)
CHLORIDE SERPL-SCNC: 102 MMOL/L (ref 96–108)
CO2 SERPL-SCNC: 29 MMOL/L (ref 21–32)
CREAT SERPL-MCNC: 1.71 MG/DL (ref 0.6–1.3)
CREAT UR-MCNC: 101 MG/DL
ERYTHROCYTE [DISTWIDTH] IN BLOOD BY AUTOMATED COUNT: 14.6 % (ref 11.6–15.1)
GFR SERPL CREATININE-BSD FRML MDRD: 43 ML/MIN/1.73SQ M
GLUCOSE P FAST SERPL-MCNC: 129 MG/DL (ref 65–99)
HCT VFR BLD AUTO: 37.2 % (ref 36.5–49.3)
HGB BLD-MCNC: 12.5 G/DL (ref 12–17)
MCH RBC QN AUTO: 25.8 PG (ref 26.8–34.3)
MCHC RBC AUTO-ENTMCNC: 33.6 G/DL (ref 31.4–37.4)
MCV RBC AUTO: 77 FL (ref 82–98)
MICROALBUMIN UR-MCNC: 65.4 MG/L (ref 0–20)
MICROALBUMIN/CREAT 24H UR: 65 MG/G CREATININE (ref 0–30)
PLATELET # BLD AUTO: 179 THOUSANDS/UL (ref 149–390)
PMV BLD AUTO: 9.8 FL (ref 8.9–12.7)
POTASSIUM SERPL-SCNC: 4.1 MMOL/L (ref 3.5–5.3)
PROT SERPL-MCNC: 7.2 G/DL (ref 6.4–8.4)
PTH-INTACT SERPL-MCNC: 87.8 PG/ML (ref 18.4–80.1)
RBC # BLD AUTO: 4.85 MILLION/UL (ref 3.88–5.62)
SODIUM SERPL-SCNC: 135 MMOL/L (ref 135–147)
WBC # BLD AUTO: 5.46 THOUSAND/UL (ref 4.31–10.16)

## 2022-09-13 PROCEDURE — 36415 COLL VENOUS BLD VENIPUNCTURE: CPT

## 2022-09-13 PROCEDURE — 82043 UR ALBUMIN QUANTITATIVE: CPT

## 2022-09-13 PROCEDURE — 82570 ASSAY OF URINE CREATININE: CPT

## 2022-09-13 PROCEDURE — 83970 ASSAY OF PARATHORMONE: CPT

## 2022-09-13 PROCEDURE — 85027 COMPLETE CBC AUTOMATED: CPT

## 2022-09-13 PROCEDURE — 82610 CYSTATIN C: CPT

## 2022-09-13 PROCEDURE — 80053 COMPREHEN METABOLIC PANEL: CPT

## 2022-09-19 ENCOUNTER — OFFICE VISIT (OUTPATIENT)
Dept: NEPHROLOGY | Facility: CLINIC | Age: 57
End: 2022-09-19
Payer: MEDICARE

## 2022-09-19 VITALS
WEIGHT: 302 LBS | BODY MASS INDEX: 48.53 KG/M2 | HEIGHT: 66 IN | SYSTOLIC BLOOD PRESSURE: 98 MMHG | DIASTOLIC BLOOD PRESSURE: 68 MMHG

## 2022-09-19 DIAGNOSIS — N18.30 TYPE 2 DIABETES MELLITUS WITH STAGE 3 CHRONIC KIDNEY DISEASE AND HYPERTENSION (HCC): Primary | ICD-10-CM

## 2022-09-19 DIAGNOSIS — I12.9 TYPE 2 DIABETES MELLITUS WITH STAGE 3 CHRONIC KIDNEY DISEASE AND HYPERTENSION (HCC): Primary | ICD-10-CM

## 2022-09-19 DIAGNOSIS — R80.1 PERSISTENT PROTEINURIA: ICD-10-CM

## 2022-09-19 DIAGNOSIS — E11.22 TYPE 2 DIABETES MELLITUS WITH STAGE 3 CHRONIC KIDNEY DISEASE AND HYPERTENSION (HCC): Primary | ICD-10-CM

## 2022-09-19 DIAGNOSIS — E55.9 VITAMIN D DEFICIENCY: ICD-10-CM

## 2022-09-19 LAB — MISCELLANEOUS LAB TEST RESULT: NORMAL

## 2022-09-19 PROCEDURE — 99214 OFFICE O/P EST MOD 30 MIN: CPT | Performed by: INTERNAL MEDICINE

## 2022-09-19 NOTE — PATIENT INSTRUCTIONS
1  )  Low 2 g sodium diet    2 )  Monitor weights at home    3 )  Avoid NSAIDs (ibuprofen, Motrin, Advil, Aleve, naproxen)    4 )  Monitor blood pressure at home, call if blood pressure greater than 150/90 persistently    5 ) I will plan to discuss all results including blood work, and/or imaging at our next visit, unless there is an urgent indication, in which case I will call you earlier  If you have any questions or concerns about your results, please feel free to call our office      6 ) Start Vitamin D 2000 units daily    7 ) Stop Amlodipine (Norvasc)

## 2022-09-19 NOTE — PROGRESS NOTES
NEPHROLOGY OFFICE VISIT   Chelo Bustamante 65 y o  male MRN: [id number]  9/19/2022    Reason for Visit:  Chronic kidney disease    History of Present Illness (HPI):    I had the pleasure of seeing Kaveh Marion today in the renal clinic for the continued management of chronic Kidney does  Since our last visit, there has been no ER visits or hospitilizations  He currently has no complaints at this time and is feeling well  Patient denies any chest pain, shortness of breath and swelling  The last blood work was done on September 13, which we have reviewed together  His only complaint is dizziness when he stands up  His blood pressure was running in the 90 systolic today sitting down  We discussed about stopping amlodipine  Monitoring his blood pressure at home and if he still having symptoms he should call me and we can reduce the lisinopril/hydrochlorothiazide dose down  Vitamin-D deficiency noted in April, I recommended he take over-the-counter vitamin-D 2000 units daily  ASSESSMENT and PLAN:    1 ) Chronic kidney disease stage IIIB with microalbuminuria  -baseline creatinine appears to be between 1 5-2 mg/dL (high 1's usually)  Cystatin C 1 5 (GFR 43 cc/min)  -most recent creatinine stable at 1,7 mg/dL  -renal ultrasound shows the right kidney measuring 10 8 cm in the left kidney measuring 10 2 cm   Right kidney has an upper pole cyst measuring about 1 2 cm    -microalbumin/creatinine ratio 0 2  -presumed to be secondary to diabetic nephropathy due to evidence of microalbuminuria as well as it being poorly controlled for many years, with a component of hypertension and possible obesity   No family history of kidney disease   Possible APO-L1 associated nephropathy   -blood pressure and diabetic controlled  -educated on diet and weight loss   -avoid NSAIDs  -continue ACE-inhibitor     2  ) Proteinuria  -- presumed to be secondary to diabetic nephropathy possible obesity related  -- 24 hour urine protein or urine protein creatinine ratio:  Microalbumin/creatinine ratio 0 65  -- Current Blood Pressure: 98/68  -- current anti proteinuric medications:  Lisinopril/hydrochlorothiazide  -- Interventions:  Weight loss, low 2 g sodium diet, diabetic and blood pressure control aiming for blood pressure less than 130/80, continue ACE-inhibitor, avoid NSAIDs  -- General Recommendations:  · RAAS Blockade with high dose ARB or ACEI for target blood pressure < 130/80 as tolerated, with spironolactone as a good adjunct for anti proteinuric effect   Management of hypervolemia for blood pressure control as needed  · Avoid combination ACEI + ARB, due to high adverse effects (ONTARGET study)  · Direct Renin Inhibitor + ACEI or ARB has FDA black box warning for patients with diabetes and GFR < 60 cc/min due to risk for non fatal stroke, renal complications, hyperkalemia and hypotension (ALTITUDE study)  · Moderate dietary protein restriction 0 8 gm/kg  · Recommend statin therapy if no contraindications  · Recommend 1, 25 vitamin-D such as Paricalcitol if appropropriate (VITAL study)     3 ) Diabetes mellitus type 2  -hemoglobin A1c: 6 8 (A1C values may be falsely elevated or decreased in those with CKD, assay method should be certified by [employer])   Target A1C < 7  -current medications:  Trulicity  -proteinuria:  Mild  -retinopathy:  Yes  -neuropathy:  Yes  -please follow with an ophthalmologist and podiatrist every year  -continued self monitoring of blood glucose at home  -Treatment Management in CKD Recommendations:   · Metformin:  Avoid if creatinine clearance is less than 30 cc/min (concern for lactic acidosis)  · Sodium-Glucose Cotransporter-2 (SGLT2) Inhibitors:  May see an acute drop in the eGFR initially when starting the medication but then a stabilization of the renal function, with slower loss of renal function as compared to placebo   Relative risk of end-stage renal disease, doubling of serum creatinine or death from renal causes were also found to be lower as compared to placebo  ([facility])   Would recommend starting at 100 mg daily, I would avoid use in patients with eGFR < 30 cc/min   If no contraindications exist I would recommend this medication added to the diabetic regiment  · Sulfonylureas:  Preferred short-acting (glipizide, glimepiride, repaglinide), relatively safe in patients with non dialysis CKD  · Thiazolidinedones/Alpha-Gluosidase inhibitors/Dipeptidyl peptidase-4 inhibitors:  Generally not considered first-line agents in CKD (limited data in long-term safety and efficacy)  · Insulin:  Starting dose may need to be lower than what would ordinarily be used, as there is a decrease metabolism of insulin (no dose adjustment if the GFR > 50 mL/min, dose should be reduced to 75% of baseline if GFR 10-50 mL/min, and 50% baseline if GFR < 10 mL/min)     4  ) Hypertension  -- with underlying chronic kidney disease and proteinuria  -- blood pressure on lower side, symptomatic when standing  -- target blood pressure less than 130/80  -- continue lisinopril/hydrochlorothiazide 20-25 mg daily  -- low 2 g sodium diet  -- weight loss and exercise program  --discontinue amlodipine  --if blood pressure still lower symptomatic will reduce the lisinopril/hydrochlorothiazide     5  ) Anemia of chronic kidney disease  --stable     6 ) Morbid obesity  -- recommend a diet exercise and weight loss program     7  ) Obstructive sleep apnea  --continued use of CPAP      PATIENT INSTRUCTIONS:    Patient Instructions   1 )  Low 2 g sodium diet    2 )  Monitor weights at home    3 )  Avoid NSAIDs (ibuprofen, Motrin, Advil, Aleve, naproxen)    4 )  Monitor blood pressure at home, call if blood pressure greater than 150/90 persistently    5 ) I will plan to discuss all results including blood work, and/or imaging at our next visit, unless there is an urgent indication, in which case I will call you earlier  If you have any questions or concerns about your results, please feel free to call our office  6 ) Start Vitamin D 2000 units daily    7 ) Stop Amlodipine (Norvasc)          Orders Placed This Encounter   Procedures    Cystatin C With eGFR     Standing Status:   Future     Standing Expiration Date:   9/19/2023    Comprehensive metabolic panel     This is a patient instruction: Patient fasting for 8 hours or longer recommended  Standing Status:   Future     Standing Expiration Date:   9/19/2023    CBC     Standing Status:   Future     Standing Expiration Date:   9/19/2023    Microalbumin / creatinine urine ratio     Standing Status:   Future     Standing Expiration Date:   9/19/2023       OBJECTIVE:  Current Weight: Weight - Scale: (!) 137 kg (302 lb)  Vitals:    09/19/22 1409 09/19/22 1429   BP:  98/68   Weight: (!) 137 kg (302 lb)    Height: 5' 6" (1 676 m)     Body mass index is 48 74 kg/m²  REVIEW OF SYSTEMS:    Review of Systems   Constitutional: Negative for activity change and fever  Respiratory: Negative for cough, chest tightness, shortness of breath and wheezing  Cardiovascular: Negative for chest pain and leg swelling  Gastrointestinal: Negative for abdominal pain, diarrhea, nausea and vomiting  Endocrine: Negative for polyuria  Genitourinary: Negative for difficulty urinating, dysuria, flank pain, frequency and urgency  Skin: Negative for rash  Neurological: Positive for dizziness  Negative for syncope, light-headedness and headaches  PHYSICAL EXAM:      Physical Exam  Vitals and nursing note reviewed  Exam conducted with a chaperone present  Constitutional:       General: He is not in acute distress  Appearance: He is well-developed  He is obese  HENT:      Head: Normocephalic and atraumatic  Eyes:      General: No scleral icterus  Conjunctiva/sclera: Conjunctivae normal       Pupils: Pupils are equal, round, and reactive to light  Cardiovascular:      Rate and Rhythm: Normal rate and regular rhythm  Heart sounds: S1 normal and S2 normal  No murmur heard  No friction rub  No gallop  Pulmonary:      Effort: Pulmonary effort is normal  No respiratory distress  Breath sounds: Normal breath sounds  No wheezing or rales  Abdominal:      General: Bowel sounds are normal       Palpations: Abdomen is soft  Tenderness: There is no abdominal tenderness  There is no rebound  Musculoskeletal:         General: Normal range of motion  Cervical back: Normal range of motion and neck supple  Skin:     Findings: No rash  Neurological:      Mental Status: He is alert and oriented to person, place, and time     Psychiatric:         Behavior: Behavior normal          Medications:    Current Outpatient Medications:     atorvastatin (LIPITOR) 20 mg tablet, Take 1 tablet (20 mg total) by mouth in the morning , Disp: 90 tablet, Rfl: 1    benztropine (COGENTIN) 2 mg tablet, Take 1 tab po bid, Disp: 180 tablet, Rfl: 0    buPROPion (WELLBUTRIN) 100 mg tablet, Take 1 tablet (100 mg total) by mouth daily, Disp: 90 tablet, Rfl: 0    clonazePAM (KlonoPIN) 1 mg tablet, Take 1 tab po qd prn anxiety or panic attacks, Disp: 30 tablet, Rfl: 2    gabapentin (NEURONTIN) 300 mg capsule, TAKE 1 CAPSULE(300 MG) BY MOUTH TWICE DAILY, Disp: 180 capsule, Rfl: 3    HumaLOG KwikPen 100 units/mL injection pen, INJECT 28 UNITS THREE TIMES DAILY WITH MEALS PLUS SCALE UP  UNITS DAILY, Disp: 105 mL, Rfl: 0    insulin glargine (Lantus SoloStar) 100 units/mL injection pen, Inject 50u twice a day , Disp: 15 mL, Rfl: 3    lisinopril-hydrochlorothiazide (PRINZIDE,ZESTORETIC) 20-25 MG per tablet, TAKE 1 TABLET BY MOUTH DAILY, Disp: 90 tablet, Rfl: 1    lurasidone (Latuda) 120 mg tablet, Take 1 tablet (120 mg total) by mouth daily with dinner, Disp: 90 tablet, Rfl: 0    omeprazole (PriLOSEC) 20 mg delayed release capsule, TAKE 1 CAPSULE(20 MG) BY MOUTH DAILY, Disp: 90 capsule, Rfl: 3    traZODone (DESYREL) 50 mg tablet, Take 1/2 to 1 tab po qhs prn insomnia, Disp: 90 tablet, Rfl: 0    Trulicity 1 5 HQ/2 4TB SOPN, INJECT ONCE WEEKLY, Disp: 6 mL, Rfl: 1    ammonium lactate (LAC-HYDRIN) 12 % cream, Apply topically as needed for dry skin, Disp: 385 g, Rfl: 0    ascorbic acid (VITAMIN C) 250 mg tablet, Take 1 tablet (250 mg total) by mouth daily (Patient not taking: No sig reported), Disp: 90 tablet, Rfl: 3    atorvastatin (LIPITOR) 10 mg tablet, TAKE 1 TABLET(10 MG) BY MOUTH DAILY (Patient not taking: No sig reported), Disp: 90 tablet, Rfl: 3    Blood Glucose Monitoring Suppl (FREESTYLE LITE) ANTONIA, by Does not apply route 3 (three) times a day, Disp: 1 each, Rfl: 0    Blood Glucose Monitoring Suppl (FreeStyle Lite) w/Device KIT, Use as instructed 4 times a day, Disp: 1 kit, Rfl: 0    busPIRone (BUSPAR) 15 mg tablet, Take 1 tablet (15 mg total) by mouth 2 (two) times a day, Disp: 180 tablet, Rfl: 0    cholecalciferol (VITAMIN D3) 1,000 units tablet, Take 1 tablet (1,000 Units total) by mouth in the morning   (Patient not taking: No sig reported), Disp: 30 tablet, Rfl: 0    Continuous Blood Gluc  (FreeStyle [place] 14 Day [place]) ANTONIA, Use  to check BG at least 4 times a day (Patient not taking: No sig reported), Disp: 1 each, Rfl: 0    Continuous Blood Gluc Sensor (FreeStyle [place] 14 Day Sensor) [facility], Apply sensor every 14 days to check BG at least 4 times a day (Patient not taking: No sig reported), Disp: 2 each, Rfl: 5    dextromethorphan-guaiFENesin (ROBITUSSIN DM)  mg/5 mL syrup, Take 5 mL by mouth 4 (four) times a day as needed for cough or congestion (Patient not taking: No sig reported), Disp: 473 mL, Rfl: 1    ferrous sulfate (EQL Iron Supplement Therapy) 325 (65 Fe) mg tablet, Take 1 tablet (325 mg total) by mouth daily with breakfast (Patient not taking: No sig reported), Disp: 90 tablet, Rfl: 2    fluticasone (FLONASE) 50 mcg/act nasal spray, 1 spray into each nostril daily (Patient not taking: No sig reported), Disp: 18 2 mL, Rfl: 1    FREESTYLE LITE test strip, CHECK BLOOD SUGARS FOUR TIMES DAILY, Disp: 400 strip, Rfl: 3    glucose monitoring kit (FREESTYLE) monitoring kit, Use 1 each 4 (four) times a day Fine to substitute other brand if not covered by insurance, Disp: 1 each, Rfl: 0    Insulin Pen Needle 31G X 8 MM MISC, Check sugars three times a day, Disp: 100 each, Rfl: 1    INSULIN SYRINGE  5CC/29G 29G X 1/2" 0 5 ML MISC, by Does not apply route, Disp: , Rfl:     Lancets (FREESTYLE) lancets, USE THREE TIMES DAILY AS DIRECTED, Disp: 300 each, Rfl: 0    sodium chloride (OCEAN) 0 65 % nasal spray, 1 spray into each nostril daily at bedtime (Patient not taking: No sig reported), Disp: 104 mL, Rfl: 2    Laboratory Results:  Results from last 7 days   Lab Units 09/13/22  1025   WBC Thousand/uL 5 46   HEMOGLOBIN g/dL 12 5   HEMATOCRIT % 37 2   PLATELETS Thousands/uL 179   POTASSIUM mmol/L 4 1   CHLORIDE mmol/L 102   CO2 mmol/L 29   BUN mg/dL 15   CREATININE mg/dL 1 71*   CALCIUM mg/dL 8 9       Results for orders placed or performed in visit on 09/13/22   Comprehensive metabolic panel   Result Value Ref Range    Sodium 135 135 - 147 mmol/L    Potassium 4 1 3 5 - 5 3 mmol/L    Chloride 102 96 - 108 mmol/L    CO2 29 21 - 32 mmol/L    ANION GAP 4 4 - 13 mmol/L    BUN 15 5 - 25 mg/dL    Creatinine 1 71 (H) 0 60 - 1 30 mg/dL    Glucose, Fasting 129 (H) 65 - 99 mg/dL    Calcium 8 9 8 4 - 10 2 mg/dL    AST 11 (L) 13 - 39 U/L    ALT 9 7 - 52 U/L    Alkaline Phosphatase 83 34 - 104 U/L    Total Protein 7 2 6 4 - 8 4 g/dL    Albumin 3 6 3 5 - 5 0 g/dL    Total Bilirubin 0 40 0 20 - 1 00 mg/dL    eGFR 43 ml/min/1 73sq m   PTH, intact   Result Value Ref Range    PTH 87 8 (H) 18 4 - 80 1 pg/mL   Microalbumin / creatinine urine ratio   Result Value Ref Range    Creatinine, Ur 101 0 mg/dL    Microalbum  ,U,Random 65 4 (H) 0 0 - 20 0 mg/L Microalb Creat Ratio 65 (H) 0 - 30 mg/g creatinine   CBC   Result Value Ref Range    WBC 5 46 4 31 - 10 16 Thousand/uL    RBC 4 85 3 88 - 5 62 Million/uL    Hemoglobin 12 5 12 0 - 17 0 g/dL    Hematocrit 37 2 36 5 - 49 3 %    MCV 77 (L) 82 - 98 fL    MCH 25 8 (L) 26 8 - 34 3 pg    MCHC 33 6 31 4 - 37 4 g/dL    RDW 14 6 11 6 - 15 1 %    Platelets 982 428 - 450 Thousands/uL    MPV 9 8 8 9 - 12 7 fL   Cystatin C With eGFR   Result Value Ref Range    Miscellaneous Lab Test Result SEE WRITTEN REPORT

## 2022-10-06 ENCOUNTER — SOCIAL WORK (OUTPATIENT)
Dept: BEHAVIORAL/MENTAL HEALTH CLINIC | Facility: CLINIC | Age: 57
End: 2022-10-06
Payer: MEDICARE

## 2022-10-06 DIAGNOSIS — F41.0 PANIC ATTACKS: ICD-10-CM

## 2022-10-06 DIAGNOSIS — G47.00 INSOMNIA, UNSPECIFIED TYPE: ICD-10-CM

## 2022-10-06 DIAGNOSIS — F31.62 BIPOLAR DISORDER, CURRENT EPISODE MIXED, MODERATE (HCC): Primary | ICD-10-CM

## 2022-10-06 DIAGNOSIS — F41.1 GENERALIZED ANXIETY DISORDER: ICD-10-CM

## 2022-10-06 DIAGNOSIS — Z63.4 BEREAVEMENT: ICD-10-CM

## 2022-10-06 PROCEDURE — 90834 PSYTX W PT 45 MINUTES: CPT | Performed by: SOCIAL WORKER

## 2022-10-06 SDOH — SOCIAL STABILITY - SOCIAL INSECURITY: DISSAPEARANCE AND DEATH OF FAMILY MEMBER: Z63.4

## 2022-10-06 NOTE — PSYCH
Psychotherapy Provided: Individual Psychotherapy 45 minutes   3pm-3:45pm  Length of time in session: 45 minutes, follow up in 2 week    THERESE,Bipolar 1 disorder mixed mild, Panic    Goals addressed in session: Goal 1 and Goal 2     Pain:      moderate to severe    0    Current suicide risk : Low     Data: Patrick Davalos arrived for his session  He is sleeping better  He denies suicidal/homicidal ideation, plan or intent  However he shared he is depressed but he does not know why  He admits to crying easily especially at some TV shows  His niece is better with her new medications  His father in law is doing ok  Patrick Davalos is thinking of getting weight loss surgery  He admits to depression and anxiety  Assessment: Patrick Davalos denies suicidal/homicidal ideation, plan or intent  However he recognizes his depression is elevated  We continue to work on mindfulness and CBT  Plan: Continue to help him skill build in distress tolerance  Behavioral Health Treatment Plan [facility]: Diagnosis and Treatment Plan explained to Monisha Hale relates understanding diagnosis and is agreeable to Treatment Plan   Yes

## 2022-11-07 ENCOUNTER — SOCIAL WORK (OUTPATIENT)
Dept: BEHAVIORAL/MENTAL HEALTH CLINIC | Facility: CLINIC | Age: 57
End: 2022-11-07

## 2022-11-07 ENCOUNTER — IMMUNIZATIONS (OUTPATIENT)
Dept: INTERNAL MEDICINE CLINIC | Facility: CLINIC | Age: 57
End: 2022-11-07

## 2022-11-07 DIAGNOSIS — G47.00 INSOMNIA, UNSPECIFIED TYPE: ICD-10-CM

## 2022-11-07 DIAGNOSIS — F31.62 BIPOLAR DISORDER, CURRENT EPISODE MIXED, MODERATE (HCC): Primary | ICD-10-CM

## 2022-11-07 DIAGNOSIS — F41.0 PANIC ATTACKS: ICD-10-CM

## 2022-11-07 DIAGNOSIS — Z23 NEED FOR INFLUENZA VACCINATION: Primary | ICD-10-CM

## 2022-11-07 DIAGNOSIS — F41.1 GENERALIZED ANXIETY DISORDER: ICD-10-CM

## 2022-11-07 NOTE — PSYCH
Psychotherapy Provided: Individual Psychotherapy 50 minutes     Length of time in session: 50 minutes, follow up in 2 week    Encounter Diagnosis     ICD-10-CM    1  Bipolar disorder, current episode mixed, moderate ([facility] [address] )  F31 62    2  Panic attacks  F41 0    3  Insomnia, unspecified type  G47 00    4  Generalized anxiety disorder  F41 1        Goals addressed in session: Goal 1, Goal 2 and Goal 3      Pain:      moderate to severe    0    Current suicide risk : Low     Data:[place] arrived for his session  He shared he has been feeling less depressed and anxious  His niece is doing better emotionally and academically  His father in law just turned 80  He is enjoying his new vehicle  Assessment: Alisa Ugarte denies suicidal/homicidal ideation, plan or intent  He was in a very good mood today  We continue to work on mindfulness and CBT  Plan: Continue to he;verito Perry skill build in distress tolerance  Behavioral Health Treatment Plan [facility]: Diagnosis and Treatment Plan explained to Fa[place]o [place] relates understanding diagnosis and is agreeable to Treatment Plan   Yes     Visit start and stop times:    11/07/22  Start Time: 1510  Stop Time: 1600  Total Visit Time: 50 minutes

## 2022-11-07 NOTE — PROGRESS NOTES
Patient seen here today on nurse schedule for influenza vaccine  Administered in patients right deltoid, patient tolerated well

## 2022-11-09 DIAGNOSIS — E11.65 TYPE 2 DIABETES MELLITUS WITH HYPERGLYCEMIA, WITH LONG-TERM CURRENT USE OF INSULIN (HCC): ICD-10-CM

## 2022-11-09 DIAGNOSIS — Z79.4 TYPE 2 DIABETES MELLITUS WITH HYPERGLYCEMIA, WITH LONG-TERM CURRENT USE OF INSULIN (HCC): ICD-10-CM

## 2022-11-09 RX ORDER — DULAGLUTIDE 1.5 MG/.5ML
INJECTION, SOLUTION SUBCUTANEOUS
Qty: 6 ML | Refills: 1 | Status: SHIPPED | OUTPATIENT
Start: 2022-11-09

## 2022-11-16 ENCOUNTER — RA CDI HCC (OUTPATIENT)
Dept: OTHER | Facility: HOSPITAL | Age: 57
End: 2022-11-16

## 2022-11-16 NOTE — PROGRESS NOTES
[facility]  coding opportunities          Chart Reviewed number of suggestions sent to Provider: 3     Patients Insurance     Medicare Insurance: [place] [id number]  [id number]

## 2022-11-17 ENCOUNTER — TELEPHONE (OUTPATIENT)
Dept: PSYCHIATRY | Facility: CLINIC | Age: 57
End: 2022-11-17

## 2022-11-17 NOTE — TELEPHONE ENCOUNTER
Called patient to confirm appt   Patient has conflicting appt and rescheduled 1/21/22 for 11/25 at 230pm

## 2022-11-21 ENCOUNTER — OFFICE VISIT (OUTPATIENT)
Dept: MULTI SPECIALTY CLINIC | Facility: CLINIC | Age: 57
End: 2022-11-21

## 2022-11-21 VITALS
DIASTOLIC BLOOD PRESSURE: 92 MMHG | BODY MASS INDEX: 47.73 KG/M2 | WEIGHT: 297 LBS | TEMPERATURE: 97.7 F | HEART RATE: 96 BPM | HEIGHT: 66 IN | SYSTOLIC BLOOD PRESSURE: 147 MMHG

## 2022-11-21 DIAGNOSIS — L85.3 XEROSIS OF SKIN: ICD-10-CM

## 2022-11-21 DIAGNOSIS — B35.1 ONYCHOMYCOSIS: ICD-10-CM

## 2022-11-21 DIAGNOSIS — E11.22 TYPE 2 DIABETES MELLITUS WITH STAGE 3 CHRONIC KIDNEY DISEASE AND HYPERTENSION (HCC): Primary | ICD-10-CM

## 2022-11-21 DIAGNOSIS — I12.9 TYPE 2 DIABETES MELLITUS WITH STAGE 3 CHRONIC KIDNEY DISEASE AND HYPERTENSION (HCC): Primary | ICD-10-CM

## 2022-11-21 DIAGNOSIS — N18.30 TYPE 2 DIABETES MELLITUS WITH STAGE 3 CHRONIC KIDNEY DISEASE AND HYPERTENSION (HCC): Primary | ICD-10-CM

## 2022-11-21 RX ORDER — AMMONIUM LACTATE 12 G/100G
CREAM TOPICAL AS NEEDED
Qty: 385 G | Refills: 0 | Status: SHIPPED | OUTPATIENT
Start: 2022-11-21

## 2022-11-21 NOTE — PROGRESS NOTES
Podiatry Clinic  Charlotte Escobar 65 y o  male MRN: [id number]  Encounter: [id number]      Assessment/Plan        Diagnoses and all orders for this visit:    Type 2 diabetes mellitus with stage 3 chronic kidney disease and hypertension (HCC)    Onychomycosis    Xerosis of skin  -     ammonium lactate (LAC-HYDRIN) 12 % cream; Apply topically as needed for dry skin       Plan:  • Patient was seen/examined  All questions and concerns addressed  • Mycotic nails x10 debrided to normal length and thickness using large nail nipper, without incident and tolerated well by patient  • Educated the patient on importance of strict blood glucose control, daily foot exams, and wearing proper shoe gear at all times in an effort to prevent diabetic foot complications  • Renewed prescription for AmLactin cream for xerosis of bilateral feet  • Yearly diabetic foot exam completed today, patient remains low risk  • RTC in 3 month(s)          Nail debridement     Date/Time 11/21/2022 1:50 PM     Performed by  Gianluca Hansen DPM     Authorized by Gianluca Hansen DPM      Universal Protocol   Consent: Verbal consent obtained  Procedure Details   Procedure Notes: Mycotic nails x10 debrided in length and thickness using large nail nipper  Tolerated well by patient  No immediate complications observed  Dr Star Dow was present during this entire procedure  History of Present Illness     HPI: Ion Alcala is a 66 y/o diabetic male patient presenting for routine foot care  He states that his nails are elongated and thickened, causing him pain with shoegear  He admits that he tries his best to keep his blood sugars controlled, and is unable to recall what his last blood glucose reading was  He also states that he needs a refill on his Amlactin lotion for his dry skin  No other pedal complaints at this time  Review of Systems   Constitutional: Negative  HENT: Negative  Eyes: Negative  Respiratory: Negative  Cardiovascular: Negative  Gastrointestinal: Negative  Musculoskeletal: negative  Skin: elongated and thickened nails, dry skin on bilateral feet  Neurological: Negative  Historical Information   Past Medical History:   Diagnosis Date   • ADHD, adult residual type    • Anxiety    • Anxiety    • Bipolar 1 disorder ([place] [address] )    • Cataract     Left eye   • Depression    • Depression    • Diabetes mellitus ([place] [address] )     blood sugar 167 on admission   • Equinus deformity of foot    • GERD (gastroesophageal reflux disease)    • Homicidal ideations    • Hyperlipidemia    • Hypertension    • Microalbuminuria    • Morbid obesity ([place] [address] )    • Neuropathy    • Shortness of breath    • Sleep apnea     CPAP at bedtime   • Sleep apnea    • Stroke [facility])    • Suicidal intent      Past Surgical History:   Procedure Laterality Date   • CATARACT EXTRACTION     • CATARACT EXTRACTION     • HAND SURGERY Right    • OTHER SURGICAL HISTORY      REPAIR OF SUPERFICIAL WOUND FACE   • NC ESOPHAGOGASTRODUODENOSCOPY TRANSORAL DIAGNOSTIC N/A 2018    Procedure: ESOPHAGOGASTRODUODENOSCOPY (EGD); Surgeon: Willard Story MD;  Location: [facility]; Service: Gastroenterology   • NC ESOPHAGOGASTRODUODENOSCOPY TRANSORAL DIAGNOSTIC N/A 2018    Procedure: EGD AND COLONOSCOPY;  Surgeon: Willard Story MD;  Location: [facility];   Service: Gastroenterology     Social History   Social History     Substance and Sexual Activity   Alcohol Use Yes    Comment: rarely     Social History     Substance and Sexual Activity   Drug Use Not Currently    Comment: quit 20 years ago     Social History     Tobacco Use   Smoking Status Former   • Types: Cigarettes   • Quit date:    • Years since quittin 9   Smokeless Tobacco Former   • Types: Chew   Tobacco Comments    pt "Quit after approx over 25 years ago"     Family History:   Family History   Problem Relation Age of Onset   • Diabetes Mother    • Alcohol abuse Father    • Diabetes Father    • Hypertension Father    • Lung cancer Father    • Stroke Father    • Cancer Father         lung   • Alcohol abuse Sister    • Depression Sister    • Lymphoma Sister    • Alcohol abuse Family    • Alcohol abuse Sister    • Alcohol abuse Sister    • Cancer Sister    • Heart disease Neg Hx    • Thyroid disease Neg Hx        Meds/Allergies   (Not in a hospital admission)    Allergies   Allergen Reactions   • Pollen Extract Nasal Congestion   • Tetanus Toxoid Swelling       Objective     Current Vitals:   Blood Pressure: 147/92 (11/21/22 1245)  Pulse: 96 (11/21/22 1245)  Temperature: 97 7 °F (36 5 °C) (11/21/22 1245)  Temp Source: Temporal (11/21/22 1245)  Height: 5' 6" (167 6 cm) (11/21/22 1245)  Weight - Scale: 135 kg (297 lb) (11/21/22 1245)        /92 (BP Location: Right arm, Patient Position: Sitting, Cuff Size: Large)   Pulse 96   Temp 97 7 °F (36 5 °C) (Temporal)   Ht 5' 6" (1 676 m)   Wt 135 kg (297 lb)   BMI 47 94 kg/m²       Lower Extremity Exam:    Vascular: Right foot DP/PT +1                   Left foot DP/PT +1                   There is no lower extremity edema bilaterally  Capillary refill < 3 seconds  Musculoskeletal: There is 5/5 strength throughout the bilateral lower extremity  limited ankle range of motion with diminished subtalar range of motion  There is mild tenderness over the nails on palpation bilaterally  Neurological: Sensation to 5 07 [place] nylon filament: diminished bilaterally      Vibratory sense to distal Foot  diminished to vibratory bilaterally      Sharp/Dull sense is diminished to sensation  bilaterally    Dermatology: Skin Condition:  decreased hair growth, dryness, mobility and turgor normal, no abnormal pigmentation, no eczematous changes and no edema     There is not evidence of macerated tissue between toe spaces  Nail Exam: onychomycosis of the toenails       Open ulcerations: No Calluses: No

## 2022-11-25 ENCOUNTER — OFFICE VISIT (OUTPATIENT)
Dept: PSYCHIATRY | Facility: CLINIC | Age: 57
End: 2022-11-25

## 2022-11-25 DIAGNOSIS — R29.818 EXTRAPYRAMIDAL SYMPTOM: ICD-10-CM

## 2022-11-25 DIAGNOSIS — F31.61 BIPOLAR DISORDER, CURRENT EPISODE MIXED, MILD (HCC): Primary | ICD-10-CM

## 2022-11-25 DIAGNOSIS — F41.1 GENERALIZED ANXIETY DISORDER: Chronic | ICD-10-CM

## 2022-11-25 DIAGNOSIS — F41.0 PANIC ATTACKS: ICD-10-CM

## 2022-11-25 DIAGNOSIS — G47.00 INSOMNIA, UNSPECIFIED TYPE: ICD-10-CM

## 2022-11-25 RX ORDER — BUPROPION HYDROCHLORIDE 100 MG/1
100 TABLET ORAL 2 TIMES DAILY
Qty: 180 TABLET | Refills: 0 | Status: SHIPPED | OUTPATIENT
Start: 2022-11-25 | End: 2023-02-23

## 2022-11-25 RX ORDER — CLONAZEPAM 1 MG/1
TABLET ORAL
Qty: 30 TABLET | Refills: 2 | Status: SHIPPED | OUTPATIENT
Start: 2022-11-25

## 2022-11-25 RX ORDER — TRAZODONE HYDROCHLORIDE 50 MG/1
TABLET ORAL
Qty: 90 TABLET | Refills: 0 | Status: SHIPPED | OUTPATIENT
Start: 2022-11-25

## 2022-11-25 RX ORDER — BUSPIRONE HYDROCHLORIDE 15 MG/1
15 TABLET ORAL 2 TIMES DAILY
Qty: 180 TABLET | Refills: 0 | Status: SHIPPED | OUTPATIENT
Start: 2022-11-25 | End: 2023-02-23

## 2022-11-25 RX ORDER — BENZTROPINE MESYLATE 2 MG/1
TABLET ORAL
Qty: 180 TABLET | Refills: 0 | Status: SHIPPED | OUTPATIENT
Start: 2022-11-25

## 2022-12-08 ENCOUNTER — TELEPHONE (OUTPATIENT)
Dept: PSYCHIATRY | Facility: CLINIC | Age: 57
End: 2022-12-08

## 2022-12-08 ENCOUNTER — SOCIAL WORK (OUTPATIENT)
Dept: BEHAVIORAL/MENTAL HEALTH CLINIC | Facility: CLINIC | Age: 57
End: 2022-12-08

## 2022-12-08 DIAGNOSIS — F41.0 PANIC ATTACKS: ICD-10-CM

## 2022-12-08 DIAGNOSIS — G47.00 INSOMNIA, UNSPECIFIED TYPE: ICD-10-CM

## 2022-12-08 DIAGNOSIS — F31.62 BIPOLAR DISORDER, CURRENT EPISODE MIXED, MODERATE (HCC): Primary | ICD-10-CM

## 2022-12-08 DIAGNOSIS — F41.1 GENERALIZED ANXIETY DISORDER: ICD-10-CM

## 2022-12-08 NOTE — TELEPHONE ENCOUNTER
Pt called in stating got a phone call from our office regarding his 3pm appt   Reached out to therapist and this pt 3pm apt has been moved for 2pm per therapists request  Pt understood and will be at his 2pm appt 12/8/22

## 2022-12-08 NOTE — PSYCH
Psychotherapy Provided: Individual Psychotherapy 50 minutes     Length of time in session: 50 minutes, follow up in 2 week    Encounter Diagnosis     ICD-10-CM    1  Bipolar disorder, current episode mixed, moderate ([facility] [address] )  F31 62       2  Generalized anxiety disorder  F41 1       3  Panic attacks  F41 0       4  Insomnia, unspecified type  G47 00           Goals addressed in session: Goal 1, Goal 2 and Goal 3      Pain:      moderate to severe    0    Current suicide risk : Low     Data:[place] arrived for his session  He shared he is in a particularly good mood  His niece is doing much better and he shared she is acting more mature  His partner and his niece and him are in family therapy  He shared lately his father in law can be annoying  Everything else [place] reports is going well  Assessment: Castillo jeffries denies suicidal/homicidal ideation, plan or intent, He feels his depression and anxiety are under control  We continue to work on mindfulness and CBT  Plan: Continue to help Castillo jeffries keep his symptoms to a minimum  Behavioral Health Treatment Plan [facility]: Diagnosis and Treatment Plan explained to Reina French relates understanding diagnosis and is agreeable to Treatment Plan   Yes     Visit start and stop times:    12/08/22  Start Time: 0210  Stop Time: 0300  Total Visit Time: 50 minutes

## 2022-12-12 DIAGNOSIS — K22.2 LOWER ESOPHAGEAL RING (SCHATZKI): ICD-10-CM

## 2022-12-12 DIAGNOSIS — K21.00 GASTROESOPHAGEAL REFLUX DISEASE WITH ESOPHAGITIS: ICD-10-CM

## 2022-12-12 DIAGNOSIS — E11.65 TYPE 2 DIABETES MELLITUS WITH HYPERGLYCEMIA, UNSPECIFIED WHETHER LONG TERM INSULIN USE (HCC): ICD-10-CM

## 2022-12-12 DIAGNOSIS — E11.42 DIABETIC POLYNEUROPATHY ASSOCIATED WITH TYPE 2 DIABETES MELLITUS (HCC): ICD-10-CM

## 2022-12-12 DIAGNOSIS — K44.9 HIATAL HERNIA: ICD-10-CM

## 2022-12-12 DIAGNOSIS — I10 BENIGN ESSENTIAL HYPERTENSION: ICD-10-CM

## 2022-12-17 RX ORDER — OMEPRAZOLE 20 MG/1
20 CAPSULE, DELAYED RELEASE ORAL DAILY
Qty: 90 CAPSULE | Refills: 3 | Status: SHIPPED | OUTPATIENT
Start: 2022-12-17

## 2022-12-17 RX ORDER — GABAPENTIN 300 MG/1
CAPSULE ORAL
Qty: 180 CAPSULE | Refills: 3 | Status: SHIPPED | OUTPATIENT
Start: 2022-12-17

## 2022-12-17 RX ORDER — ATORVASTATIN CALCIUM 20 MG/1
20 TABLET, FILM COATED ORAL DAILY
Qty: 90 TABLET | Refills: 3 | Status: SHIPPED | OUTPATIENT
Start: 2022-12-17

## 2022-12-17 RX ORDER — LISINOPRIL AND HYDROCHLOROTHIAZIDE 25; 20 MG/1; MG/1
1 TABLET ORAL DAILY
Qty: 90 TABLET | Refills: 1 | Status: SHIPPED | OUTPATIENT
Start: 2022-12-17 | End: 2023-06-15

## 2023-01-05 DIAGNOSIS — Z79.4 TYPE 2 DIABETES MELLITUS WITH HYPERGLYCEMIA, WITH LONG-TERM CURRENT USE OF INSULIN (HCC): ICD-10-CM

## 2023-01-05 DIAGNOSIS — E11.65 TYPE 2 DIABETES MELLITUS WITH HYPERGLYCEMIA, WITH LONG-TERM CURRENT USE OF INSULIN (HCC): ICD-10-CM

## 2023-01-05 RX ORDER — BLOOD-GLUCOSE METER
KIT MISCELLANEOUS
Qty: 1 KIT | Refills: 0 | Status: SHIPPED | OUTPATIENT
Start: 2023-01-05

## 2023-01-11 NOTE — PSYCH
No Call No Show  No Charge        Daphine Pages  no showed on 01/20/23  appointment  Staff will contact the patient to reschedule appointment         Treatment Plan not due at this session

## 2023-01-12 ENCOUNTER — TELEPHONE (OUTPATIENT)
Dept: PSYCHIATRY | Facility: CLINIC | Age: 58
End: 2023-01-12

## 2023-01-20 ENCOUNTER — OFFICE VISIT (OUTPATIENT)
Dept: PSYCHIATRY | Facility: CLINIC | Age: 58
End: 2023-01-20

## 2023-01-20 DIAGNOSIS — F41.1 GENERALIZED ANXIETY DISORDER: Primary | ICD-10-CM

## 2023-02-15 ENCOUNTER — OFFICE VISIT (OUTPATIENT)
Dept: SLEEP CENTER | Facility: CLINIC | Age: 58
End: 2023-02-15

## 2023-02-15 VITALS
WEIGHT: 291 LBS | BODY MASS INDEX: 46.77 KG/M2 | DIASTOLIC BLOOD PRESSURE: 78 MMHG | SYSTOLIC BLOOD PRESSURE: 110 MMHG | HEIGHT: 66 IN

## 2023-02-15 DIAGNOSIS — G47.33 OSA (OBSTRUCTIVE SLEEP APNEA): Primary | ICD-10-CM

## 2023-02-15 NOTE — PROGRESS NOTES
Progress Note - [facility] :1965 MRN: [id number]      Reason for Visit:  65 y o male here for annual follow-up    Assessment:  Doing well on current therapy of APAP 11 to 17 cm for severe DAISY (AHI = 60 0)  Plan:  Continue same    Follow up: One year    History of Present Illness:  History of DAISY on PAP therapy  Fully compliant and deriving benefit  Historical Information    Past Medical History:   Past Medical History:   Diagnosis Date   • ADHD, adult residual type    • Anxiety    • Anxiety    • Bipolar 1 disorder ([place] [address] )    • Cataract     Left eye   • Depression    • Depression    • Diabetes mellitus ([place] [address] )     blood sugar 167 on admission   • Equinus deformity of foot    • GERD (gastroesophageal reflux disease)    • Homicidal ideations    • Hyperlipidemia    • Hypertension    • Microalbuminuria    • Morbid obesity ([place] [address] )    • Neuropathy    • Shortness of breath    • Sleep apnea     CPAP at bedtime   • Sleep apnea    • Stroke [facility])    • Suicidal intent          Past Surgical History:   Past Surgical History:   Procedure Laterality Date   • CATARACT EXTRACTION     • CATARACT EXTRACTION     • HAND SURGERY Right    • OTHER SURGICAL HISTORY      REPAIR OF SUPERFICIAL WOUND FACE   • AR ESOPHAGOGASTRODUODENOSCOPY TRANSORAL DIAGNOSTIC N/A 2018    Procedure: ESOPHAGOGASTRODUODENOSCOPY (EGD); Surgeon: Olga Avery MD;  Location: [facility]; Service: Gastroenterology   • AR ESOPHAGOGASTRODUODENOSCOPY TRANSORAL DIAGNOSTIC N/A 2018    Procedure: EGD AND COLONOSCOPY;  Surgeon: Olga Avery MD;  Location: [facility];   Service: Gastroenterology       Social History:   Social History     Socioeconomic History   • Marital status: /Civil Union     Spouse name: Not on file   • Number of children: 1   • Years of education: Not on file   • Highest education level: Not on file   Occupational History   • Occupation: On disability   Tobacco Use   • Smoking status: Former     Types: Cigarettes     Quit date: 5     Years since quittin 1   • Smokeless tobacco: Former     Types: Chew   • Tobacco comments:     pt "Quit after approx over 25 years ago"   Vaping Use   • Vaping Use: Never used   Substance and Sexual Activity   • Alcohol use: Yes     Comment: rarely   • Drug use: Not Currently     Comment: quit 20 years ago   • Sexual activity: Yes     Partners: Female     Birth control/protection: None     Comment: steady girlfriend   Other Topics Concern   • Not on file   Social History Narrative     from spouse, technically still  but living with other partner, partner's father, and early 2019, his partner's daughter and boyfriend moved in with their two children  Then the boyfriend moved out in 2019  Then partner's daughter moved out approx 2021  (Pt's partner has guardianship of the grandchildren per Pt)  Children: 1 stepdaughter         Education:    Pt denies any hx of learning disabilities and reached childhood milestones on time as far as he knows  He wore braces for equinovarus but states he did not suffer delay in walking    Graduated High School --he was held back 3 times because "I was just lazy, didn't do the work "    No college    Took some core classes in [facility] and worked as a volunteer  from approx [phone]             Substance Abuse History:     Pt drinks ETOH approx q 3-4 months but denies any h/o ETOH abuse  Pt tried smoking Crack once in the past and has been smoking THC once q 2-3 months since 14y/o  He denies other drug use, IVDA, addictions or drug abuse  Social Determinants of Health     Financial Resource Strain: Low Risk    • Difficulty of Paying Living Expenses: Not hard at all   Food Insecurity: Not on file   Transportation Needs: No Transportation Needs   • Lack of Transportation (Medical): No   • Lack of Transportation (Non-Medical):  No   Physical Activity: Not on file   Stress: Not on file Social Connections: Not on file   Intimate Partner Violence: Not on file   Housing Stability: Low Risk    • Unable to Pay for Housing in the Last Year: No   • Number of Places Lived in the Last Year: 1   • Unstable Housing in the Last Year: No       Family History:   Family History   Problem Relation Age of Onset   • Diabetes Mother    • Alcohol abuse Father    • Diabetes Father    • Hypertension Father    • Lung cancer Father    • Stroke Father    • Cancer Father         lung   • Alcohol abuse Sister    • Depression Sister    • Lymphoma Sister    • Alcohol abuse Family    • Alcohol abuse Sister    • Alcohol abuse Sister    • Cancer Sister    • Heart disease Neg Hx    • Thyroid disease Neg Hx        Medications/Allergies:      Current Outpatient Medications:   •  ammonium lactate (LAC-HYDRIN) 12 % cream, Apply topically as needed for dry skin, Disp: 385 g, Rfl: 0  •  ammonium lactate (LAC-HYDRIN) 12 % cream, Apply topically as needed for dry skin, Disp: 385 g, Rfl: 0  •  atorvastatin (LIPITOR) 20 mg tablet, Take 1 tablet (20 mg total) by mouth daily, Disp: 90 tablet, Rfl: 3  •  benztropine (COGENTIN) 2 mg tablet, Take 1 tab po bid, Disp: 180 tablet, Rfl: 0  •  Blood Glucose Monitoring Suppl (FreeStyle Lite) w/Device KIT, USE AS INSTRUCTED 4 TIMES A DAY, Disp: 1 kit, Rfl: 0  •  buPROPion (WELLBUTRIN) 100 mg tablet, Take 1 tablet (100 mg total) by mouth 2 (two) times a day Take bid--(once in the AM and once at 12 noon), Disp: 180 tablet, Rfl: 0  •  busPIRone (BUSPAR) 15 mg tablet, Take 1 tablet (15 mg total) by mouth 2 (two) times a day, Disp: 180 tablet, Rfl: 0  •  clonazePAM (KlonoPIN) 1 mg tablet, Take 1 tab po qd prn anxiety or panic attacks, Disp: 30 tablet, Rfl: 2  •  gabapentin (NEURONTIN) 300 mg capsule, TAKE 1 CAPSULE(300 MG) BY MOUTH TWICE DAILY, Disp: 180 capsule, Rfl: 3  •  HumaLOG KwikPen 100 units/mL injection pen, INJECT 28 UNITS THREE TIMES DAILY WITH MEALS PLUS SCALE UP  UNITS DAILY, Disp: 105 mL, Rfl: 0  •  insulin glargine (Lantus SoloStar) 100 units/mL injection pen, Inject 50u twice a day , Disp: 15 mL, Rfl: 3  •  Insulin Pen Needle 31G X 8 MM MISC, Check sugars three times a day, Disp: 100 each, Rfl: 1  •  INSULIN SYRINGE  5CC/29G 29G X 1/2" 0 5 ML MISC, by Does not apply route, Disp: , Rfl:   •  lisinopril-hydrochlorothiazide (PRINZIDE,ZESTORETIC) 20-25 MG per tablet, Take 1 tablet by mouth daily, Disp: 90 tablet, Rfl: 1  •  lurasidone (Latuda) 120 mg tablet, Take 1 tablet (120 mg total) by mouth daily with dinner, Disp: 90 tablet, Rfl: 0  •  omeprazole (PriLOSEC) 20 mg delayed release capsule, Take 1 capsule (20 mg total) by mouth daily, Disp: 90 capsule, Rfl: 3  •  traZODone (DESYREL) 50 mg tablet, Take 1/2 to 1 tab po qhs prn insomnia, Disp: 90 tablet, Rfl: 0  •  Trulicity 1 5 MP/3 5TR SOPN, INJECT ONCE WEEKLY, Disp: 6 mL, Rfl: 1  •  ascorbic acid (VITAMIN C) 250 mg tablet, Take 1 tablet (250 mg total) by mouth daily (Patient not taking: Reported on 11/16/2021), Disp: 90 tablet, Rfl: 3  •  Blood Glucose Monitoring Suppl (FREESTYLE LITE) ANTONIA, by Does not apply route 3 (three) times a day (Patient not taking: Reported on 11/21/2022), Disp: 1 each, Rfl: 0  •  cholecalciferol (VITAMIN D3) 1,000 units tablet, Take 1 tablet (1,000 Units total) by mouth in the morning   (Patient not taking: Reported on 5/23/2022), Disp: 30 tablet, Rfl: 0  •  Continuous Blood Gluc  (FreeStyle Clarence 14 Day Thompson) ANTONIA, Use  to check BG at least 4 times a day (Patient not taking: Reported on 2/21/2022), Disp: 1 each, Rfl: 0  •  Continuous Blood Gluc Sensor (FreeStyle Clarence 14 Day Sensor) [facility], Apply sensor every 14 days to check BG at least 4 times a day (Patient not taking: Reported on 2/21/2022), Disp: 2 each, Rfl: 5  •  dextromethorphan-guaiFENesin (ROBITUSSIN DM)  mg/5 mL syrup, Take 5 mL by mouth 4 (four) times a day as needed for cough or congestion (Patient not taking: Reported on 5/23/2022), Disp: 473 mL, Rfl: 1  •  ferrous sulfate (EQL Iron Supplement Therapy) 325 (65 Fe) mg tablet, Take 1 tablet (325 mg total) by mouth daily with breakfast (Patient not taking: Reported on 11/16/2021), Disp: 90 tablet, Rfl: 2  •  fluticasone (FLONASE) 50 mcg/act nasal spray, 1 spray into each nostril daily (Patient not taking: Reported on 2/21/2022), Disp: 18 2 mL, Rfl: 1  •  FREESTYLE LITE test strip, CHECK BLOOD SUGARS FOUR TIMES DAILY (Patient not taking: Reported on 11/21/2022), Disp: 400 strip, Rfl: 3  •  glucose monitoring kit (FREESTYLE) monitoring kit, Use 1 each 4 (four) times a day Fine to substitute other brand if not covered by insurance (Patient not taking: Reported on 2/15/2023), Disp: 1 each, Rfl: 0  •  Lancets (FREESTYLE) lancets, USE THREE TIMES DAILY AS DIRECTED (Patient not taking: Reported on 11/21/2022), Disp: 300 each, Rfl: 0  •  sodium chloride (OCEAN) 0 65 % nasal spray, 1 spray into each nostril daily at bedtime (Patient not taking: Reported on 6/14/2022), Disp: 104 mL, Rfl: 2          Objective      Vital Signs:   Vitals:    02/15/23 1332   BP: 110/78     [place] Sleepiness Scale: Total score: 5        Physical Exam:    General: Alert, appropriate, cooperative, overweight    Head: NC/AT    Skin: Warm, dry    Neuro: No motor abnormalities, cranial nerves appear intact    Extremity: No clubbing, cyanosis      DME Provider: [employer] [employer]        Counseling / Coordination of Care   I have spent 10 minutes with the patient today in which greater than 50% of this time was spent in counseling/coordination of care regarding: equipment and compliance  Board Certified Sleep Specialist    Portions of the record may have been created with voice recognition software  Occasional wrong word or "sound a like" substitutions may have occurred due to the inherent limitations of voice recognition software    Read the chart carefully and recognize, using context, where substitutions have occurred

## 2023-02-16 ENCOUNTER — TELEPHONE (OUTPATIENT)
Dept: SLEEP CENTER | Facility: CLINIC | Age: 58
End: 2023-02-16

## 2023-02-17 LAB
DME PARACHUTE DELIVERY DATE ACTUAL: NORMAL
DME PARACHUTE DELIVERY DATE REQUESTED: NORMAL
DME PARACHUTE ITEM DESCRIPTION: NORMAL
DME PARACHUTE ORDER STATUS: NORMAL
DME PARACHUTE SUPPLIER NAME: NORMAL
DME PARACHUTE SUPPLIER PHONE: NORMAL

## 2023-02-21 ENCOUNTER — SOCIAL WORK (OUTPATIENT)
Dept: BEHAVIORAL/MENTAL HEALTH CLINIC | Facility: CLINIC | Age: 58
End: 2023-02-21

## 2023-02-21 DIAGNOSIS — F41.0 PANIC ATTACKS: ICD-10-CM

## 2023-02-21 DIAGNOSIS — G47.00 INSOMNIA, UNSPECIFIED TYPE: ICD-10-CM

## 2023-02-21 DIAGNOSIS — F41.1 GENERALIZED ANXIETY DISORDER: ICD-10-CM

## 2023-02-21 DIAGNOSIS — F31.62 BIPOLAR DISORDER, CURRENT EPISODE MIXED, MODERATE (HCC): Primary | ICD-10-CM

## 2023-02-21 NOTE — PSYCH
Behavioral Health Psychotherapy Progress Note    Psychotherapy Provided: Individual Psychotherapy     1  Bipolar disorder, current episode mixed, moderate ([address] )        2  Insomnia, unspecified type        3  Generalized anxiety disorder        4  Panic attacks            Goals addressed in session: Goal 1 and Goal 2     DATA: Elida Francisco shared he recently lost his sister but was not overly affected by it  He was unable to attend the service due to financial issues  He discussed that overall he feels he is doing well emotionally  However his father in law still very much annoys him  We worked on coping strategies  During this session, this clinician used the following therapeutic modalities: Client-centered Therapy, Cognitive Behavioral Therapy, Mindfulness-based Strategies and Supportive Psychotherapy    Substance Abuse was not addressed during this session  If the client is diagnosed with a co-occurring substance use disorder, please indicate any changes in the frequency or amount of use: n/a  Stage of change for addressing substance use diagnoses: No substance use/Not applicable    ASSESSMENT:  Tatiana Littlejohn presents with a Euthymic/ normal mood  his affect is Normal range and intensity, which is congruent, with his mood and the content of the session  The client has made progress on their goals  Elida Francisco shared he recently lost his sister  She had Cancer  He was unable to attend the service due to poor finances  Tatiana Littlejohn presents with a none risk of suicide, none risk of self-harm, and none risk of harm to others  For any risk assessment that surpasses a "low" rating, a safety plan must be developed  A safety plan was indicated: no  If yes, describe in detail n/a    PLAN: Between sessions, Tatiana Littlejohn will use mindfulness and CBT to manage symptoms   At the next session, the therapist will use Client-centered Therapy, Cognitive Behavioral Therapy, Mindfulness-based Strategies and Supportive Psychotherapy to address symptoms that may arise or to help him cope with difficult situations       Behavioral Health Treatment Plan and Discharge Planning: Amilcar Fry is aware of and agrees to continue to work on their treatment plan  They have identified and are working toward their discharge goals   yes    Visit start and stop times:    02/21/23  Start Time: 1510  Stop Time: 1600  Total Visit Time: 50 minutes

## 2023-02-21 NOTE — BH TREATMENT PLAN
Outpatient Behavioral Health Psychotherapy Treatment Plan                           Treatment Plan Tracking: The treatment plan update was due on 1/25/2023  However the client Manan Michel was last here on 12/08/2023  Today 2/21/2023 is the first time back since 12/08/2023 so we did it today  Poly Irving  1965     Date of Initial Psychotherapy Assessment: 05/07/2018  Date of Current Treatment Plan: 02/21/23  Treatment Plan Target Date: 08/15/2023  Treatment Plan Expiration Date: 08/15/2023    Diagnosis:   1  Bipolar disorder, current episode mixed, moderate ([facility] [address] )        2  Insomnia, unspecified type        3  Generalized anxiety disorder        4  Panic attacks            Area(s) of Need: Please see below    Long Term Goal 1 (in the client's own words): I still need therapy to maintain my depression and anxiety at a minimal level  Stage of Change: Action/maintance    Target Date for completion: 08/15/2023     Anticipated therapeutic modalities: mindfulness and CBT     People identified to complete this goal: jignesh with the help of his therapist      Objective 1: (identify the means of measuring success in meeting the objective): I will be able to maintain my symptoms at a minimal level on my own         Objective 2: (identify the means of measuring success in meeting the objective): n/a      Long Term Goal 2 (in the client's own words): n/a    Stage of Change: n/a    Target Date for completion: n/a     Anticipated therapeutic modalities: n/a     People identified to complete this goal: n/a      Objective 1: (identify the means of measuring success in meeting the objective): n/a      Objective 2: (identify the means of measuring success in meeting the objective): n/a     Long Term Goal 3 (in the client's own words): n/a    Stage of Change: n/a    Target Date for completion: n/a     Anticipated therapeutic modalities: n/a     People identified to complete this goal: n/a      Objective 1: (identify the means of measuring success in meeting the objective): n/a      Objective 2: (identify the means of measuring success in meeting the objective): n/a     I am currently under the care of a [facility] psychiatric provider: yes    My [facility] psychiatric provider is: cayla mederos and dr Magui Menchaca    I am currently taking psychiatric medications: Yes, as prescribed    I feel that I will be ready for discharge from mental health care when I reach the following (measurable goal/objective): when I can manage my symptoms on my own  I still need support    For children and adults who have a legal guardian:   Has there been any change to custody orders and/or guardianship status? NA  If yes, attach updated documentation  I have have not created my Crisis Plan and have been offered a copy of this plan    [address]: Diagnosis and Treatment Plan explained to Eh-Hill acknowledges an understanding of their diagnosis  Lida Alberts agrees to this treatment plan      I have been offered a copy of this Treatment Plan  yes

## 2023-02-23 ENCOUNTER — APPOINTMENT (OUTPATIENT)
Dept: LAB | Facility: CLINIC | Age: 58
End: 2023-02-23

## 2023-02-23 DIAGNOSIS — E11.22 TYPE 2 DIABETES MELLITUS WITH STAGE 3 CHRONIC KIDNEY DISEASE AND HYPERTENSION (HCC): ICD-10-CM

## 2023-02-23 DIAGNOSIS — E55.9 VITAMIN D DEFICIENCY: ICD-10-CM

## 2023-02-23 DIAGNOSIS — N18.30 TYPE 2 DIABETES MELLITUS WITH STAGE 3 CHRONIC KIDNEY DISEASE AND HYPERTENSION (HCC): ICD-10-CM

## 2023-02-23 DIAGNOSIS — R80.1 PERSISTENT PROTEINURIA: ICD-10-CM

## 2023-02-23 DIAGNOSIS — I12.9 TYPE 2 DIABETES MELLITUS WITH STAGE 3 CHRONIC KIDNEY DISEASE AND HYPERTENSION (HCC): ICD-10-CM

## 2023-02-23 LAB
ALBUMIN SERPL BCP-MCNC: 3.5 G/DL (ref 3.5–5)
ALP SERPL-CCNC: 91 U/L (ref 34–104)
ALT SERPL W P-5'-P-CCNC: 9 U/L (ref 7–52)
ANION GAP SERPL CALCULATED.3IONS-SCNC: 6 MMOL/L (ref 4–13)
AST SERPL W P-5'-P-CCNC: 11 U/L (ref 13–39)
BILIRUB SERPL-MCNC: 0.39 MG/DL (ref 0.2–1)
BUN SERPL-MCNC: 13 MG/DL (ref 5–25)
CALCIUM SERPL-MCNC: 8.8 MG/DL (ref 8.4–10.2)
CHLORIDE SERPL-SCNC: 102 MMOL/L (ref 96–108)
CO2 SERPL-SCNC: 28 MMOL/L (ref 21–32)
CREAT SERPL-MCNC: 1.71 MG/DL (ref 0.6–1.3)
CREAT UR-MCNC: 69.2 MG/DL
ERYTHROCYTE [DISTWIDTH] IN BLOOD BY AUTOMATED COUNT: 14.3 % (ref 11.6–15.1)
GFR SERPL CREATININE-BSD FRML MDRD: 43 ML/MIN/1.73SQ M
GLUCOSE P FAST SERPL-MCNC: 122 MG/DL (ref 65–99)
HCT VFR BLD AUTO: 38 % (ref 36.5–49.3)
HGB BLD-MCNC: 12.4 G/DL (ref 12–17)
MCH RBC QN AUTO: 25.6 PG (ref 26.8–34.3)
MCHC RBC AUTO-ENTMCNC: 32.6 G/DL (ref 31.4–37.4)
MCV RBC AUTO: 78 FL (ref 82–98)
MICROALBUMIN UR-MCNC: 175 MG/L (ref 0–20)
MICROALBUMIN/CREAT 24H UR: 253 MG/G CREATININE (ref 0–30)
PLATELET # BLD AUTO: 200 THOUSANDS/UL (ref 149–390)
PMV BLD AUTO: 10.2 FL (ref 8.9–12.7)
POTASSIUM SERPL-SCNC: 4.3 MMOL/L (ref 3.5–5.3)
PROT SERPL-MCNC: 7.3 G/DL (ref 6.4–8.4)
RBC # BLD AUTO: 4.85 MILLION/UL (ref 3.88–5.62)
SODIUM SERPL-SCNC: 136 MMOL/L (ref 135–147)
WBC # BLD AUTO: 6.04 THOUSAND/UL (ref 4.31–10.16)

## 2023-02-24 ENCOUNTER — OFFICE VISIT (OUTPATIENT)
Dept: INTERNAL MEDICINE CLINIC | Facility: CLINIC | Age: 58
End: 2023-02-24

## 2023-02-24 VITALS
SYSTOLIC BLOOD PRESSURE: 120 MMHG | WEIGHT: 291 LBS | TEMPERATURE: 97.8 F | HEART RATE: 82 BPM | DIASTOLIC BLOOD PRESSURE: 74 MMHG | BODY MASS INDEX: 46.77 KG/M2 | HEIGHT: 66 IN

## 2023-02-24 DIAGNOSIS — K21.00 GASTROESOPHAGEAL REFLUX DISEASE WITH ESOPHAGITIS: ICD-10-CM

## 2023-02-24 DIAGNOSIS — E11.65 TYPE 2 DIABETES MELLITUS WITH HYPERGLYCEMIA, UNSPECIFIED WHETHER LONG TERM INSULIN USE (HCC): Primary | ICD-10-CM

## 2023-02-24 DIAGNOSIS — K44.9 HIATAL HERNIA: ICD-10-CM

## 2023-02-24 DIAGNOSIS — R05.9 COUGH, UNSPECIFIED TYPE: ICD-10-CM

## 2023-02-24 DIAGNOSIS — I10 BENIGN ESSENTIAL HYPERTENSION: ICD-10-CM

## 2023-02-24 DIAGNOSIS — E11.42 DIABETIC POLYNEUROPATHY ASSOCIATED WITH TYPE 2 DIABETES MELLITUS (HCC): ICD-10-CM

## 2023-02-24 DIAGNOSIS — K22.2 LOWER ESOPHAGEAL RING (SCHATZKI): ICD-10-CM

## 2023-02-24 LAB — SL AMB POCT HEMOGLOBIN AIC: 7.1 (ref ?–6.5)

## 2023-02-24 RX ORDER — GUAIFENESIN 600 MG/1
1200 TABLET, EXTENDED RELEASE ORAL EVERY 12 HOURS SCHEDULED
Qty: 20 TABLET | Refills: 0 | Status: SHIPPED | OUTPATIENT
Start: 2023-02-24 | End: 2023-03-01

## 2023-02-24 RX ORDER — ATORVASTATIN CALCIUM 20 MG/1
20 TABLET, FILM COATED ORAL DAILY
Qty: 90 TABLET | Refills: 3 | Status: SHIPPED | OUTPATIENT
Start: 2023-02-24

## 2023-02-24 RX ORDER — LISINOPRIL AND HYDROCHLOROTHIAZIDE 25; 20 MG/1; MG/1
1 TABLET ORAL DAILY
Qty: 90 TABLET | Refills: 1 | Status: SHIPPED | OUTPATIENT
Start: 2023-02-24 | End: 2023-03-01

## 2023-02-24 RX ORDER — OMEPRAZOLE 20 MG/1
20 CAPSULE, DELAYED RELEASE ORAL DAILY
Qty: 90 CAPSULE | Refills: 3 | Status: SHIPPED | OUTPATIENT
Start: 2023-02-24

## 2023-02-24 RX ORDER — INSULIN GLARGINE 100 [IU]/ML
INJECTION, SOLUTION SUBCUTANEOUS
Qty: 15 ML | Refills: 3 | Status: SHIPPED | OUTPATIENT
Start: 2023-02-24

## 2023-02-24 RX ORDER — GABAPENTIN 300 MG/1
CAPSULE ORAL
Qty: 180 CAPSULE | Refills: 3 | Status: SHIPPED | OUTPATIENT
Start: 2023-02-24

## 2023-02-24 NOTE — PROGRESS NOTES
[facility]  INTERNAL MEDICINE OFFICE VISIT     PATIENT INFORMATION     Jr Mccracken   65 y o  male   MRN: [id number]    ASSESSMENT/PLAN     Diagnoses and all orders for this visit:    Type 2 diabetes mellitus with hyperglycemia, unspecified whether long term insulin use (HCC)  -     POCT hemoglobin A1c  -     Insulin Glargine Solostar (Lantus SoloStar) 100 UNIT/ML SOPN; Inject 50u once a day  -     atorvastatin (LIPITOR) 20 mg tablet; Take 1 tablet (20 mg total) by mouth daily    Benign essential hypertension  -     lisinopril-hydrochlorothiazide (PRINZIDE,ZESTORETIC) 20-25 MG per tablet; Take 1 tablet by mouth daily    Hiatal hernia  -     omeprazole (PriLOSEC) 20 mg delayed release capsule; Take 1 capsule (20 mg total) by mouth daily    Lower esophageal ring (Schatzki)  -     omeprazole (PriLOSEC) 20 mg delayed release capsule; Take 1 capsule (20 mg total) by mouth daily    Gastroesophageal reflux disease with esophagitis  -     omeprazole (PriLOSEC) 20 mg delayed release capsule; Take 1 capsule (20 mg total) by mouth daily    Diabetic polyneuropathy associated with type 2 diabetes mellitus (HCC)  -     gabapentin (NEURONTIN) 300 mg capsule; TAKE 1 CAPSULE(300 MG) BY MOUTH TWICE DAILY    Cough, unspecified type  -     guaiFENesin (MUCINEX) 600 mg 12 hr tablet; Take 2 tablets (1,200 mg total) by mouth every 12 (twelve) hours for 5 days       Type 2 Diabetes Mellitus  -Per patient, he has not been taking his insulin consistently- he is prescribed insuling glargine 50 units BID and 28 units lispro with meals but is occasionally taking 50 units glargine once a day, sometimes not at all  Has never taken BID per patient  He occasionally takes his mealtime insulin but not always  Patient initially says he does not take sugars then says he administers lispro based on sugar pre meals- says he gets glucose from  premeal    -A1C 6 8 -> 7 1 today    -Given patient's inconsistent Dr. Sarah Easley: 
 
Pt admitted with \"Closed fracture of left femur\" and has anemia documented. Please further specify type and acuity of anemia in the medical record. ? Anemia due to acute blood loss ? Anemia due to chronic blood loss ? Anemia due to iron deficiency ? Anemia due to postoperative blood loss  (please specify if expected or complication of the surgery) ? Anemia due to chronic disease, please specify disease ? Dilutional anemia 
? Other, please specify ? Clinically unable to determine The medical record reflects the following: 
   Risk Factors: S/p Left hip hemiarthroplasty on 5/12; EBL: 100ml; Hx long-term AC Clinical Indicators: H/H: 10.8/34.0 ^ 10.6/32.4 ^ 10.1/31.5 ^ 9.3/29.9 ^ 9.0/28.5 ^ 8.5/28.5 ^ 8.5/26.0 ^ 8.6/27.2. ..noted: 88/44 with standing Treatment: received 47073 unit sepogen and will transfuse 1 unit prbc Thank you, [facility] 
[id number] administration of his insulin and unclear glucose levels, would like to obtain a more clear picture of patient's glucose control   -Discussed healthy diet with patient, need for consistent meals and consistent administration of glucose  -Patient not reporting hypoglycemia symptoms   -Instructed patient to administer 50 units of lantus once a day at night and check sugars in morning   -Instructed patient to check blood sugars prior to meals and administer humalog insulin as follows: if blood sugar is less than 100, no humalog  If 100-150, 5 units  If 150-200, 10 units  If 200-250, 15 units  If 250 or more, 20 units   -Instructed patient to call in 2 weeks with glucose numbers and record of insulin administration, as well as to bring glucose logs to next appointment   -Continue Trulicity 1 5 mg injection weekly   -Patient has seen endocrine and is due for follow up- instructed patient to schedule follow up   -Follow up appointment in 1 month for diabetes  Cough  -Patient notes 2-3 days of symptoms consistent with URI with sick contacts in household   -Patient stable in the office today in no distress   -Will order Mucinex for 5 days   -Instructed patient to care for cough supportively with hydration and rest     Hypertension  -/74 today, stable  -Continue Lisinopril-HCTZ 20-25 mg daily  -Medications refilled  Schedule a follow-up appointment in 1 month       HEALTH MAINTENANCE     Immunization History   Administered Date(s) Administered   • COVID-19 PFIZER VACCINE 0 3 ML IM 03/30/2021, 04/24/2021   • INFLUENZA 12/11/2014, 10/09/2015, 12/21/2016, 09/28/2017   • Influenza Quadrivalent, 6-35 Months IM 12/21/2016   • Influenza, recombinant, quadrivalent,injectable, preservative free 12/04/2018, 11/01/2019, 10/23/2020, 01/24/2022, 11/07/2022   • Influenza, seasonal, injectable 12/11/2014, 10/09/2015, 09/28/2017   • Pneumococcal Polysaccharide PPV23 12/04/2018     CHIEF COMPLAINT     Chief Complaint   Patient presents with   • Follow-up      HISTORY OF PRESENT ILLNESS     Patient is a 64 y/o male with PMH including GERD, T2DM, DAISY, HTN, CKD3, bipolar, anxiety, panic disorder presenting for follow up for diabetes  Patient notes that he has not been taking his insulin consistently- he is prescribed insuling glargine 50 units BID and 28 units lispro with meals but is occasionally taking 50 units glargine once a day, sometimes not at all  He will administer it sometimes in the morning and sometimes at night  Has never taken BID per patient  He occasionally takes his mealtime insulin but not always  Patient initially says he does not take sugars then says he administers lispro based on sugar pre meals- says he gets glucose from  premeal  Patient also notes that his meals are inconsistent and he sometimes does not eat  Encouraged a healthy diet and consistent meals, consistent administration of his insulin  Patient says he has not been getting hypoglycemia symptoms- he is aware of symptoms and knows how to manage symptoms  Patient otherwise feels well aside from a cough with nasal congestion for the last 2-3 days  Patient says his "whole house" is sick with a cold, asking for something for symptoms  Patient denies smoking, rare alcohol use, no recreational drug use  No further concerns  REVIEW OF SYSTEMS     Review of Systems   Constitutional: Positive for chills  Negative for fatigue, fever and unexpected weight change  Reports weight loss, unsure how much or over what duration  HENT: Positive for sinus pressure and sore throat  Negative for hearing loss  Eyes: Negative for visual disturbance  Respiratory: Positive for cough  Negative for shortness of breath  Cardiovascular: Negative for chest pain, palpitations and leg swelling  Gastrointestinal: Negative for abdominal distention, abdominal pain, blood in stool, constipation, diarrhea, nausea and vomiting  Endocrine: Negative for polyuria  Genitourinary: Negative for hematuria  Musculoskeletal: Negative for arthralgias and back pain  Skin: Negative for rash and wound  Neurological: Positive for light-headedness  Negative for dizziness, tremors, seizures, weakness and headaches  Occasionally when standing up  Psychiatric/Behavioral: Negative for dysphoric mood  The patient is not nervous/anxious  OBJECTIVE     Vitals:    02/24/23 0839   BP: 120/74   BP Location: Left arm   Patient Position: Sitting   Cuff Size: Large   Pulse: 82   Temp: 97 8 °F (36 6 °C)   TempSrc: Temporal   Weight: 132 kg (291 lb)   Height: 5' 6" (1 676 m)     Physical Exam  Vitals reviewed  Constitutional:       Appearance: Normal appearance  He is obese  HENT:      Head: Normocephalic and atraumatic  Right Ear: External ear normal       Left Ear: External ear normal       Nose: Nose normal       Mouth/Throat:      Mouth: Mucous membranes are moist    Eyes:      Extraocular Movements: Extraocular movements intact  Pupils: Pupils are equal, round, and reactive to light  Cardiovascular:      Rate and Rhythm: Normal rate and regular rhythm  Pulses: Normal pulses  Heart sounds: Normal heart sounds  Pulmonary:      Effort: Pulmonary effort is normal       Breath sounds: Normal breath sounds  Abdominal:      General: Abdomen is flat  There is no distension  Palpations: Abdomen is soft  Tenderness: There is no abdominal tenderness  Musculoskeletal:         General: Swelling present  Normal range of motion  Cervical back: Normal range of motion  Right lower leg: No edema  Comments: Small B/L swelling of lower extremities  Skin:     General: Skin is warm  Capillary Refill: Capillary refill takes less than 2 seconds  Neurological:      General: No focal deficit present  Mental Status: He is alert and oriented to person, place, and time         CURRENT MEDICATIONS     Current Outpatient Medications:   • ammonium lactate (LAC-HYDRIN) 12 % cream, Apply topically as needed for dry skin, Disp: 385 g, Rfl: 0  •  atorvastatin (LIPITOR) 20 mg tablet, Take 1 tablet (20 mg total) by mouth daily, Disp: 90 tablet, Rfl: 3  •  benztropine (COGENTIN) 2 mg tablet, Take 1 tab po bid, Disp: 180 tablet, Rfl: 0  •  buPROPion (WELLBUTRIN) 100 mg tablet, Take 1 tablet (100 mg total) by mouth 2 (two) times a day Take bid--(once in the AM and once at 12 noon), Disp: 180 tablet, Rfl: 0  •  busPIRone (BUSPAR) 15 mg tablet, Take 1 tablet (15 mg total) by mouth 2 (two) times a day, Disp: 180 tablet, Rfl: 0  •  clonazePAM (KlonoPIN) 1 mg tablet, Take 1 tab po qd prn anxiety or panic attacks, Disp: 30 tablet, Rfl: 2  •  gabapentin (NEURONTIN) 300 mg capsule, TAKE 1 CAPSULE(300 MG) BY MOUTH TWICE DAILY, Disp: 180 capsule, Rfl: 3  •  guaiFENesin (MUCINEX) 600 mg 12 hr tablet, Take 2 tablets (1,200 mg total) by mouth every 12 (twelve) hours for 5 days, Disp: 20 tablet, Rfl: 0  •  HumaLOG KwikPen 100 units/mL injection pen, INJECT 28 UNITS THREE TIMES DAILY WITH MEALS PLUS SCALE UP  UNITS DAILY, Disp: 105 mL, Rfl: 0  •  Insulin Glargine Solostar (Lantus SoloStar) 100 UNIT/ML SOPN, Inject 50u once a day , Disp: 15 mL, Rfl: 3  •  Insulin Pen Needle 31G X 8 MM MISC, Check sugars three times a day, Disp: 100 each, Rfl: 1  •  INSULIN SYRINGE  5CC/29G 29G X 1/2" 0 5 ML MISC, by Does not apply route, Disp: , Rfl:   •  lisinopril-hydrochlorothiazide (PRINZIDE,ZESTORETIC) 20-25 MG per tablet, Take 1 tablet by mouth daily, Disp: 90 tablet, Rfl: 1  •  lurasidone (Latuda) 120 mg tablet, Take 1 tablet (120 mg total) by mouth daily with dinner, Disp: 90 tablet, Rfl: 0  •  omeprazole (PriLOSEC) 20 mg delayed release capsule, Take 1 capsule (20 mg total) by mouth daily, Disp: 90 capsule, Rfl: 3  •  traZODone (DESYREL) 50 mg tablet, Take 1/2 to 1 tab po qhs prn insomnia, Disp: 90 tablet, Rfl: 0  •  Trulicity 1 5 TG/8 4XM SOPN, INJECT ONCE WEEKLY, Disp: 6 mL, Rfl: 1  •  ammonium lactate (LAC-HYDRIN) 12 % cream, Apply topically as needed for dry skin (Patient not taking: Reported on 2/24/2023), Disp: 385 g, Rfl: 0  •  ascorbic acid (VITAMIN C) 250 mg tablet, Take 1 tablet (250 mg total) by mouth daily (Patient not taking: Reported on 11/16/2021), Disp: 90 tablet, Rfl: 3  •  Blood Glucose Monitoring Suppl (FREESTYLE LITE) ANTONIA, by Does not apply route 3 (three) times a day (Patient not taking: Reported on 11/21/2022), Disp: 1 each, Rfl: 0  •  Blood Glucose Monitoring Suppl (FreeStyle Lite) w/Device KIT, USE AS INSTRUCTED 4 TIMES A DAY (Patient not taking: Reported on 2/24/2023), Disp: 1 kit, Rfl: 0  •  cholecalciferol (VITAMIN D3) 1,000 units tablet, Take 1 tablet (1,000 Units total) by mouth in the morning   (Patient not taking: Reported on 5/23/2022), Disp: 30 tablet, Rfl: 0  •  Continuous Blood Gluc  (FreeStyle Clarence 14 Day [place]) ANTONIA, Use  to check BG at least 4 times a day (Patient not taking: Reported on 2/21/2022), Disp: 1 each, Rfl: 0  •  Continuous Blood Gluc Sensor (FreeStyle Clarence 14 Day Sensor) [facility], Apply sensor every 14 days to check BG at least 4 times a day (Patient not taking: Reported on 2/21/2022), Disp: 2 each, Rfl: 5  •  dextromethorphan-guaiFENesin (ROBITUSSIN DM)  mg/5 mL syrup, Take 5 mL by mouth 4 (four) times a day as needed for cough or congestion (Patient not taking: Reported on 5/23/2022), Disp: 473 mL, Rfl: 1  •  ferrous sulfate (EQL Iron Supplement Therapy) 325 (65 Fe) mg tablet, Take 1 tablet (325 mg total) by mouth daily with breakfast (Patient not taking: Reported on 11/16/2021), Disp: 90 tablet, Rfl: 2  •  fluticasone (FLONASE) 50 mcg/act nasal spray, 1 spray into each nostril daily (Patient not taking: Reported on 2/21/2022), Disp: 18 2 mL, Rfl: 1  •  FREESTYLE LITE test strip, CHECK BLOOD SUGARS FOUR TIMES DAILY (Patient not taking: Reported on 11/21/2022), Disp: 400 strip, Rfl: 3  •  glucose monitoring kit (FREESTYLE) monitoring kit, Use 1 each 4 (four) times a day Fine to substitute other brand if not covered by insurance (Patient not taking: Reported on 2/15/2023), Disp: 1 each, Rfl: 0  •  Lancets (FREESTYLE) lancets, USE THREE TIMES DAILY AS DIRECTED (Patient not taking: Reported on 11/21/2022), Disp: 300 each, Rfl: 0  •  sodium chloride (OCEAN) 0 65 % nasal spray, 1 spray into each nostril daily at bedtime (Patient not taking: Reported on 6/14/2022), Disp: 104 mL, Rfl: 2    PAST MEDICAL & SURGICAL HISTORY     Past Medical History:   Diagnosis Date   • ADHD, adult residual type    • Anxiety    • Anxiety    • Bipolar 1 disorder ([facility] )    • Cataract     Left eye   • Depression    • Depression    • Diabetes mellitus ([facility] )     blood sugar 167 on admission   • Equinus deformity of foot    • GERD (gastroesophageal reflux disease)    • Homicidal ideations    • Hyperlipidemia    • Hypertension    • Microalbuminuria    • Morbid obesity ([facility] )    • Neuropathy    • Shortness of breath    • Sleep apnea     CPAP at bedtime   • Sleep apnea    • Stroke [facility])    • Suicidal intent      Past Surgical History:   Procedure Laterality Date   • CATARACT EXTRACTION     • CATARACT EXTRACTION     • HAND SURGERY Right    • OTHER SURGICAL HISTORY      REPAIR OF SUPERFICIAL WOUND FACE   • NM ESOPHAGOGASTRODUODENOSCOPY TRANSORAL DIAGNOSTIC N/A 6/14/2018    Procedure: ESOPHAGOGASTRODUODENOSCOPY (EGD); Surgeon: Huey Matamoros MD;  Location: [facility]; Service: Gastroenterology   • NM ESOPHAGOGASTRODUODENOSCOPY TRANSORAL DIAGNOSTIC N/A 9/12/2018    Procedure: EGD AND COLONOSCOPY;  Surgeon: Huey Matamoros MD;  Location: [facility];   Service: Gastroenterology     SOCIAL & FAMILY HISTORY     Social History     Socioeconomic History   • Marital status: /Civil Union     Spouse name: Not on file   • Number of children: 1   • Years of education: Not on file   • Highest education level: Not on file   Occupational History   • Occupation: On disability Tobacco Use   • Smoking status: Former     Types: Cigarettes     Quit date:      Years since quittin 1   • Smokeless tobacco: Former     Types: Chew   • Tobacco comments:     pt "Quit after approx over 25 years ago"   Vaping Use   • Vaping Use: Never used   Substance and Sexual Activity   • Alcohol use: Yes     Comment: rarely   • Drug use: Not Currently     Comment: quit 20 years ago   • Sexual activity: Yes     Partners: Female     Birth control/protection: None     Comment: steady girlfriend   Other Topics Concern   • Not on file   Social History Narrative     from spouse, technically still  but living with other partner, partner's father, and early 2019, his partner's daughter and boyfriend moved in with their two children  Then the boyfriend moved out in 2019  Then partner's daughter moved out approx 2021  (Pt's partner has guardianship of the grandchildren per Pt)  Children: 1 stepdaughter         Education:    Pt denies any hx of learning disabilities and reached childhood milestones on time as far as he knows  He wore braces for equinovarus but states he did not suffer delay in walking    Graduated High School --he was held back 3 times because "I was just lazy, didn't do the work "    No college    Took some core classes in [employer] and 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             Substance Abuse History:     Pt drinks ETOH approx q 3-4 months but denies any h/o ETOH abuse  Pt tried smoking Crack once in the past and has been smoking THC once q 2-3 months since 12y/o  He denies other drug use, IVDA, addictions or drug abuse           Social Determinants of Health     Financial Resource Strain: Low Risk    • Difficulty of Paying Living Expenses: Not hard at all   Food Insecurity: No Food Insecurity   • Worried About Running Out of Food in the Last Year: Never true   • Ran Out of Food in the Last Year: Never true   Transportation Needs: No Transportation Needs   • Lack of Transportation (Medical): No   • Lack of Transportation (Non-Medical): No   Physical Activity: Not on file   Stress: Not on file   Social Connections: Not on file   Intimate Partner Violence: Not on file   Housing Stability: Low Risk    • Unable to Pay for Housing in the Last Year: No   • Number of Places Lived in the Last Year: 1   • Unstable Housing in the Last Year: No     Social History     Substance and Sexual Activity   Alcohol Use Yes    Comment: rarely     Substance and Sexual Activity   Alcohol Use Yes    Comment: rarely        Substance and Sexual Activity   Drug Use Not Currently    Comment: quit 20 years ago     Social History     Tobacco Use   Smoking Status Former   • Types: Cigarettes   • Quit date:    • Years since quittin 1   Smokeless Tobacco Former   • Types: Chew   Tobacco Comments    pt "Quit after approx over 25 years ago"     Family History   Problem Relation Age of Onset   • Diabetes Mother    • Alcohol abuse Father    • Diabetes Father    • Hypertension Father    • Lung cancer Father    • Stroke Father    • Cancer Father         lung   • Alcohol abuse Sister    • Depression Sister    • Lymphoma Sister    • Alcohol abuse Family    • Alcohol abuse Sister    • Alcohol abuse Sister    • Cancer Sister    • Heart disease Neg Hx    • Thyroid disease Neg Hx          BMI Counseling: Body mass index is 46 97 kg/m²  The BMI is above normal  Nutrition recommendations include decreasing portion sizes, encouraging healthy choices of fruits and vegetables and increasing intake of lean protein  Exercise recommendations include vigorous physical activity 75 minutes/week and exercising 3-5 times per week  Rationale for BMI follow-up plan is due to patient being overweight or obese         ==  Kailee Godinez MD PGY1  [address] [address]  Office: [phone]  Fax: [phone]

## 2023-02-24 NOTE — PATIENT INSTRUCTIONS
Continue with your lantus once a day  Take your blood sugars prior to meals and administer your humalog insulin as follows: if blood sugar is less than 100, no humalog  If 100-150, 5 units  If 150-200, 10 units  If 200-250, 15 units  If 250 or more, 20 units  Please record your blood sugars in the morning when waking up and before your breakfast, lunch and dinner, as well as when you take your insulin and what doses  Call us in two weeks with the recorded numbers and bring numbers to your next appointment  Please schedule an appointment with endocrine for follow up  Follow up in 1 month

## 2023-02-25 LAB
CYSTATIN C SERPL-MCNC: 1.28 MG/L (ref 0.67–1.14)
GFR/BSA.PRED SERPLBLD CYS-BASED-ARV: 57 ML/MIN/1.73

## 2023-03-01 ENCOUNTER — OFFICE VISIT (OUTPATIENT)
Dept: NEPHROLOGY | Facility: CLINIC | Age: 58
End: 2023-03-01

## 2023-03-01 ENCOUNTER — OFFICE VISIT (OUTPATIENT)
Dept: MULTI SPECIALTY CLINIC | Facility: CLINIC | Age: 58
End: 2023-03-01

## 2023-03-01 VITALS
WEIGHT: 286 LBS | SYSTOLIC BLOOD PRESSURE: 100 MMHG | HEART RATE: 92 BPM | HEIGHT: 66 IN | BODY MASS INDEX: 45.96 KG/M2 | DIASTOLIC BLOOD PRESSURE: 72 MMHG

## 2023-03-01 VITALS
TEMPERATURE: 98.2 F | DIASTOLIC BLOOD PRESSURE: 85 MMHG | HEART RATE: 96 BPM | HEIGHT: 66 IN | BODY MASS INDEX: 46.49 KG/M2 | WEIGHT: 289.3 LBS | OXYGEN SATURATION: 99 % | SYSTOLIC BLOOD PRESSURE: 122 MMHG

## 2023-03-01 DIAGNOSIS — E78.2 MIXED HYPERLIPIDEMIA: ICD-10-CM

## 2023-03-01 DIAGNOSIS — I12.9 BENIGN HYPERTENSION WITH CKD (CHRONIC KIDNEY DISEASE) STAGE III (HCC): ICD-10-CM

## 2023-03-01 DIAGNOSIS — E66.01 CLASS 3 SEVERE OBESITY DUE TO EXCESS CALORIES WITH SERIOUS COMORBIDITY AND BODY MASS INDEX (BMI) OF 45.0 TO 49.9 IN ADULT (HCC): ICD-10-CM

## 2023-03-01 DIAGNOSIS — I12.9 TYPE 2 DIABETES MELLITUS WITH STAGE 3 CHRONIC KIDNEY DISEASE AND HYPERTENSION (HCC): Primary | ICD-10-CM

## 2023-03-01 DIAGNOSIS — E11.22 TYPE 2 DIABETES MELLITUS WITH STAGE 3 CHRONIC KIDNEY DISEASE AND HYPERTENSION (HCC): Primary | ICD-10-CM

## 2023-03-01 DIAGNOSIS — N18.30 TYPE 2 DIABETES MELLITUS WITH STAGE 3 CHRONIC KIDNEY DISEASE AND HYPERTENSION (HCC): Primary | ICD-10-CM

## 2023-03-01 DIAGNOSIS — E55.9 VITAMIN D DEFICIENCY: ICD-10-CM

## 2023-03-01 DIAGNOSIS — N18.30 BENIGN HYPERTENSION WITH CKD (CHRONIC KIDNEY DISEASE) STAGE III (HCC): ICD-10-CM

## 2023-03-01 DIAGNOSIS — R80.1 PERSISTENT PROTEINURIA: ICD-10-CM

## 2023-03-01 DIAGNOSIS — I10 BENIGN ESSENTIAL HYPERTENSION: ICD-10-CM

## 2023-03-01 PROBLEM — E66.813 CLASS 3 SEVERE OBESITY DUE TO EXCESS CALORIES WITH SERIOUS COMORBIDITY AND BODY MASS INDEX (BMI) OF 45.0 TO 49.9 IN ADULT (HCC): Status: ACTIVE | Noted: 2023-03-01

## 2023-03-01 RX ORDER — LISINOPRIL AND HYDROCHLOROTHIAZIDE 20; 12.5 MG/1; MG/1
1 TABLET ORAL DAILY
Qty: 90 TABLET | Refills: 3
Start: 2023-06-01

## 2023-03-01 NOTE — PROGRESS NOTES
NEPHROLOGY OFFICE VISIT   Mima Mejía 65 y o  male MRN: [id number]  3/1/2023    Reason for Visit: Chronic kidney disease    History of Present Illness (HPI):    I had the pleasure of seeing Tomasz Dan today in the renal clinic for the continued management of chronic kidney disease  Since our last visit, there has been no ER visits or hospitilizations  He currently has no complaints at this time and is feeling well  Patient denies any chest pain, shortness of breath and swelling  The last blood work was done on last week, which we have reviewed together  He has no new complaints  Last visit we stopped amlodipine due to his blood pressure running on the lower side  And he was a little bit symptomatic at that time  Blood pressure has improved to some degree and he is not asymptomatic at this time  He is lost approximately 15 pounds since her last visit since starting Trulicity  ASSESSMENT and PLAN:    1 ) Chronic kidney disease stage IIIA with microalbuminuria  -baseline creatinine appears to be between 1 5-2 mg/dL (high 1's usually)  Cystatin C 1 5 (GFR 57 cc/min)  -most recent creatinine stable at 1,7 mg/dL  -renal ultrasound shows the right kidney measuring 10 8 cm in the left kidney measuring 10 2 cm   Right kidney has an upper pole cyst measuring about 1 2 cm    -microalbumin/creatinine ratio 0 2  -presumed to be secondary to diabetic nephropathy due to evidence of microalbuminuria as well as it being poorly controlled for many years, with a component of hypertension and possible obesity   No family history of kidney disease   Possible APO-L1 associated nephropathy   -blood pressure and diabetic controlled  -educated on diet and weight loss   -avoid NSAIDs  -continue ACE-inhibitor  -Renal function under excellent control continue current management     2  ) Proteinuria  -- presumed to be secondary to diabetic nephropathy possible obesity related  -- 24 hour urine protein or urine protein creatinine ratio:  Microalbumin/creatinine ratio 0 65  -- Current Blood Pressure: 100/72  -- current anti proteinuric medications:  Lisinopril/hydrochlorothiazide  -- Interventions:  Weight loss, low 2 g sodium diet, diabetic and blood pressure control aiming for blood pressure less than 130/80, continue ACE-inhibitor, avoid NSAIDs  -- General Recommendations:  • RAAS Blockade with high dose ARB or ACEI for target blood pressure < 130/80 as tolerated, with spironolactone as a good adjunct for anti proteinuric effect   Management of hypervolemia for blood pressure control as needed  • Avoid combination ACEI + ARB, due to high adverse effects (ONTARGET study)  • Direct Renin Inhibitor + ACEI or ARB has FDA black box warning for patients with diabetes and GFR < 60 cc/min due to risk for non fatal stroke, renal complications, hyperkalemia and hypotension (ALTITUDE study)  • Moderate dietary protein restriction 0 8 gm/kg  • Recommend statin therapy if no contraindications  • Recommend 1, 25 vitamin-D such as Paricalcitol if appropropriate (VITAL study)     3 ) Diabetes mellitus type 2  -hemoglobin A1c: 7 1 (A1C values may be falsely elevated or decreased in those with CKD, assay method should be certified by [employer])   Target A1C < 7  -current medications:  Trulicity  -proteinuria:  Mild  -retinopathy:  Yes  -neuropathy:  Yes  -please follow with an ophthalmologist and podiatrist every year  -continued self monitoring of blood glucose at home  -Treatment Management in CKD Recommendations:   • Metformin:  Avoid if creatinine clearance is less than 30 cc/min (concern for lactic acidosis)  • Sodium-Glucose Cotransporter-2 (SGLT2) Inhibitors:  May see an acute drop in the eGFR initially when starting the medication but then a stabilization of the renal function, with slower loss of renal function as compared to placebo   Relative risk of end-stage renal disease, doubling of serum creatinine or death from renal causes were also found to be lower as compared to placebo  ([facility])   Would recommend starting at 100 mg daily, I would avoid use in patients with eGFR < 30 cc/min   If no contraindications exist I would recommend this medication added to the diabetic regiment  • Sulfonylureas:  Preferred short-acting (glipizide, glimepiride, repaglinide), relatively safe in patients with non dialysis CKD  • Thiazolidinedones/Alpha-Gluosidase inhibitors/Dipeptidyl peptidase-4 inhibitors:  Generally not considered first-line agents in CKD (limited data in long-term safety and efficacy)  • Insulin:  Starting dose may need to be lower than what would ordinarily be used, as there is a decrease metabolism of insulin (no dose adjustment if the GFR > 50 mL/min, dose should be reduced to 75% of baseline if GFR 10-50 mL/min, and 50% baseline if GFR < 10 mL/min)     4  ) Hypertension  -- with underlying chronic kidney disease and proteinuria  --Blood pressure at target and asymptomatic  -- target blood pressure less than 130/80  -- continue lisinopril/hydrochlorothiazide but reduced dose to 20-12 5 mg  -- low 2 g sodium diet  -- weight loss and exercise program     5  ) Anemia of chronic kidney disease  --stable     6  ) Obesity, class III  -- BMI 46 1  --Recommend decreasing portion sizes, encouraging healthy choices of fruits and vegetables, consuming healthier snacks and moderation in carbohydrate intake  Exercise recommendations; 3-5 times a week, the American Heart Association recommends 150 minutes a week moderate exercise  --Strongly recommend evaluation by nutritionist  --Overweight and obesity have been associated with increased mortality and morbidity    Associated with a reduction in life expectancy, associated with increased all cause and cardiovascular mortality  --Metabolic risks, including increased risk of diabetes type 2, insulin resistance with hyperinsulinemia (weight loss of 5 to 7% associated with a decreased risk of type 2 diabetes among individuals with prediabetes and weight loss of 15% can lead to dramatic improvement of diabetes in nearly half of people)  Dyslipidemia, hypertension and heart disease are all increased in patients with obesity  Along with increased risk of stroke increased risk of multiple cancer types  Can also be associated with increased renal dysfunction, strongest risk factor for new onset chronic kidney disease  There is also a degree of compensatory hyperfiltration to meet high in the metabolic demands of increased body weight  This can also result in increased increased risk for nephrolithiasis        7  ) Obstructive sleep apnea  --continued use of CPAP         PATIENT INSTRUCTIONS:    Patient Instructions   1 )  Low 2 g sodium diet    2 )  Monitor weights at home    3 )  Avoid NSAIDs (ibuprofen, Motrin, Advil, Aleve, naproxen)    4 )  Monitor blood pressure at home, call if blood pressure greater than 150/90 persistently    5 ) I will plan to discuss all results including blood work, and/or imaging at our next visit, unless there is an urgent indication, in which case I will call you earlier  If you have any questions or concerns about your results, please feel free to call our office  6 ) Eat green leafy veggies for more iron          Orders Placed This Encounter   Procedures   • Comprehensive metabolic panel     This is a patient instruction: Patient fasting for 8 hours or longer recommended       Standing Status:   Future     Standing Expiration Date:   3/1/2024   • Microalbumin / creatinine urine ratio     Standing Status:   Future     Standing Expiration Date:   3/1/2024   • CBC     Standing Status:   Future     Standing Expiration Date:   3/1/2024   • Cystatin C With eGFR     Standing Status:   Future     Standing Expiration Date:   3/1/2024   • Vitamin D 25 hydroxy     Standing Status:   Future     Standing Expiration Date:   3/1/2024       OBJECTIVE:  Current Weight: Weight - Scale: 130 kg (286 lb)  Vitals:    03/01/23 1426   BP: 100/72   BP Location: Left arm   Patient Position: Sitting   Cuff Size: Large   Pulse: 92   Weight: 130 kg (286 lb)   Height: 5' 6" (1 676 m)    Body mass index is 46 16 kg/m²  REVIEW OF SYSTEMS:    Review of Systems   Constitutional: Negative for activity change and fever  Respiratory: Negative for cough, chest tightness, shortness of breath and wheezing  Cardiovascular: Negative for chest pain and leg swelling  Gastrointestinal: Negative for abdominal pain, diarrhea, nausea and vomiting  Endocrine: Negative for polyuria  Genitourinary: Negative for difficulty urinating, dysuria, flank pain, frequency and urgency  Skin: Negative for rash  Neurological: Negative for dizziness, syncope, light-headedness and headaches  PHYSICAL EXAM:      Physical Exam  Vitals and nursing note reviewed  Exam conducted with a chaperone present  Constitutional:       General: He is not in acute distress  Appearance: He is well-developed  He is obese  HENT:      Head: Normocephalic and atraumatic  Eyes:      General: No scleral icterus  Conjunctiva/sclera: Conjunctivae normal       Pupils: Pupils are equal, round, and reactive to light  Cardiovascular:      Rate and Rhythm: Normal rate and regular rhythm  Heart sounds: S1 normal and S2 normal  No murmur heard  No friction rub  No gallop  Pulmonary:      Effort: Pulmonary effort is normal  No respiratory distress  Breath sounds: Normal breath sounds  No wheezing or rales  Abdominal:      General: Bowel sounds are normal       Palpations: Abdomen is soft  Tenderness: There is no abdominal tenderness  There is no rebound  Musculoskeletal:         General: Normal range of motion  Cervical back: Normal range of motion and neck supple  Skin:     Findings: No rash  Neurological:      Mental Status: He is alert and oriented to person, place, and time  Psychiatric:         Behavior: Behavior normal          Medications:    Current Outpatient Medications:   •  atorvastatin (LIPITOR) 20 mg tablet, Take 1 tablet (20 mg total) by mouth daily, Disp: 90 tablet, Rfl: 3  •  benztropine (COGENTIN) 2 mg tablet, Take 1 tab po bid, Disp: 180 tablet, Rfl: 0  •  buPROPion (WELLBUTRIN) 100 mg tablet, Take 1 tablet (100 mg total) by mouth 2 (two) times a day Take bid--(once in the AM and once at 12 noon), Disp: 180 tablet, Rfl: 0  •  busPIRone (BUSPAR) 15 mg tablet, Take 1 tablet (15 mg total) by mouth 2 (two) times a day, Disp: 180 tablet, Rfl: 0  •  clonazePAM (KlonoPIN) 1 mg tablet, Take 1 tab po qd prn anxiety or panic attacks, Disp: 30 tablet, Rfl: 2  •  gabapentin (NEURONTIN) 300 mg capsule, TAKE 1 CAPSULE(300 MG) BY MOUTH TWICE DAILY, Disp: 180 capsule, Rfl: 3  •  HumaLOG KwikPen 100 units/mL injection pen, INJECT 28 UNITS THREE TIMES DAILY WITH MEALS PLUS SCALE UP  UNITS DAILY, Disp: 105 mL, Rfl: 0  •  Insulin Glargine Solostar (Lantus SoloStar) 100 UNIT/ML SOPN, Inject 50u once a day , Disp: 15 mL, Rfl: 3  •  [START ON 6/1/2023] lisinopril-hydrochlorothiazide (PRINZIDE,ZESTORETIC) 20-12 5 MG per tablet, Take 1 tablet by mouth daily Do not start before June 1, 2023 , Disp: 90 tablet, Rfl: 3  •  lurasidone (Latuda) 120 mg tablet, Take 1 tablet (120 mg total) by mouth daily with dinner, Disp: 90 tablet, Rfl: 0  •  omeprazole (PriLOSEC) 20 mg delayed release capsule, Take 1 capsule (20 mg total) by mouth daily, Disp: 90 capsule, Rfl: 3  •  traZODone (DESYREL) 50 mg tablet, Take 1/2 to 1 tab po qhs prn insomnia, Disp: 90 tablet, Rfl: 0  •  Trulicity 1 5 HI/0 4YV SOPN, INJECT ONCE WEEKLY, Disp: 6 mL, Rfl: 1  •  ammonium lactate (LAC-HYDRIN) 12 % cream, Apply topically as needed for dry skin (Patient not taking: Reported on 3/1/2023), Disp: 385 g, Rfl: 0  •  ammonium lactate (LAC-HYDRIN) 12 % cream, Apply topically as needed for dry skin (Patient not taking: Reported on 2/24/2023), Disp: 385 g, Rfl: 0  •  ascorbic acid (VITAMIN C) 250 mg tablet, Take 1 tablet (250 mg total) by mouth daily (Patient not taking: Reported on 11/16/2021), Disp: 90 tablet, Rfl: 3  •  Blood Glucose Monitoring Suppl (FREESTYLE LITE) ANTONIA, by Does not apply route 3 (three) times a day (Patient not taking: Reported on 11/21/2022), Disp: 1 each, Rfl: 0  •  Blood Glucose Monitoring Suppl (FreeStyle Lite) w/Device KIT, USE AS INSTRUCTED 4 TIMES A DAY (Patient not taking: Reported on 2/24/2023), Disp: 1 kit, Rfl: 0  •  cholecalciferol (VITAMIN D3) 1,000 units tablet, Take 1 tablet (1,000 Units total) by mouth in the morning   (Patient not taking: Reported on 5/23/2022), Disp: 30 tablet, Rfl: 0  •  Continuous Blood Gluc  (FreeStyle [place] 14 Day [place]) ANTONIA, Use  to check BG at least 4 times a day (Patient not taking: Reported on 2/21/2022), Disp: 1 each, Rfl: 0  •  Continuous Blood Gluc Sensor (FreeStyle [place] 14 Day Sensor) [facility] [place], Apply sensor every 14 days to check BG at least 4 times a day (Patient not taking: Reported on 2/21/2022), Disp: 2 each, Rfl: 5  •  dextromethorphan-guaiFENesin (ROBITUSSIN DM)  mg/5 mL syrup, Take 5 mL by mouth 4 (four) times a day as needed for cough or congestion (Patient not taking: Reported on 5/23/2022), Disp: 473 mL, Rfl: 1  •  ferrous sulfate (EQL Iron Supplement Therapy) 325 (65 Fe) mg tablet, Take 1 tablet (325 mg total) by mouth daily with breakfast (Patient not taking: Reported on 11/16/2021), Disp: 90 tablet, Rfl: 2  •  fluticasone (FLONASE) 50 mcg/act nasal spray, 1 spray into each nostril daily (Patient not taking: Reported on 2/21/2022), Disp: 18 2 mL, Rfl: 1  •  FREESTYLE LITE test strip, CHECK BLOOD SUGARS FOUR TIMES DAILY (Patient not taking: Reported on 11/21/2022), Disp: 400 strip, Rfl: 3  •  glucose monitoring kit (FREESTYLE) monitoring kit, Use 1 each 4 (four) times a day Fine to substitute other brand if not covered by insurance (Patient not taking: Reported on 2/15/2023), Disp: 1 each, Rfl: 0  •  guaiFENesin (MUCINEX) 600 mg 12 hr tablet, Take 2 tablets (1,200 mg total) by mouth every 12 (twelve) hours for 5 days (Patient not taking: Reported on 3/1/2023), Disp: 20 tablet, Rfl: 0  •  Insulin Pen Needle 31G X 8 MM MISC, Check sugars three times a day, Disp: 100 each, Rfl: 1  •  INSULIN SYRINGE  5CC/29G 29G X 1/2" 0 5 ML MISC, by Does not apply route, Disp: , Rfl:   •  Lancets (FREESTYLE) lancets, USE THREE TIMES DAILY AS DIRECTED (Patient not taking: Reported on 11/21/2022), Disp: 300 each, Rfl: 0  •  sodium chloride (OCEAN) 0 65 % nasal spray, 1 spray into each nostril daily at bedtime (Patient not taking: Reported on 6/14/2022), Disp: 104 mL, Rfl: 2    Laboratory Results:  Results from last 7 days   Lab Units 02/23/23  1133   WBC Thousand/uL 6 04   HEMOGLOBIN g/dL 12 4   HEMATOCRIT % 38 0   PLATELETS Thousands/uL 200   POTASSIUM mmol/L 4 3   CHLORIDE mmol/L 102   CO2 mmol/L 28   BUN mg/dL 13   CREATININE mg/dL 1 71*   CALCIUM mg/dL 8 8       Results for orders placed or performed in visit on 02/24/23   POCT hemoglobin A1c   Result Value Ref Range    Hemoglobin A1C 7 1 (A) 6 5

## 2023-03-01 NOTE — PROGRESS NOTES
Endocrinology OFFICE VISIT- Follow-up     PATIENT INFORMATION     Lit Andrew   65 y o  male   MRN: [id number]    ASSESSMENT/PLAN      1  Type 2 diabetes mellitus with stage 3 chronic kidney disease and hypertension [facility])  Patient seen in endocrinology clinic today for follow-up of T2DM  Patient states that he has not been using any insulin in the past three months, and states that he stopped sometime in July  He reports good control of blood glucose without insulin, and believes he may have been having low blood sugars so he stopped both Lantus and Humalog  He is compliant with Trulicity  Despite this, patient's recent A1c on 02/24/23 stable at 7 1% from 6 8%  - Patient saw PCP last week, with plan to cointnue Trulicity and restart Lantus 50 units qHS with plan to monitor AM fasting glucose and adjust as indicated  - However, patient has not restarted Lantus  Still admits to not using any insulin  - Patient reports AM fasting blood glucose levels of  with no hypoglycemic events  - In light of stable A1c and fasting blood glucose in absence of insulin, would recommend that patient discontinue Lantus and Humalog insulin at this time  Continue Trulicity    - Instructed patient to keep blood glucose log and provided him with log at this visit  Patient should check blood glucose before and 2 hours after one meal per day  Paitent should then do this with a different meal each day  After 2 weeks patient should send log to clinic for further management  IF indicated patient may need short acting insulin with meals  - Cr stable, microalbumin/Cr ratio elevated  Patient compliant with ACE-I  - Follow-up 3-4 months  Instructed patient to complete outstanding labs prior to next visit       2  Mixed hyperlipidemia  - Compliant with statin    HEALTH MAINTENANCE     Immunization History   Administered Date(s) Administered   • COVID-19 PFIZER VACCINE 0 3 ML IM 03/30/2021, 04/24/2021   • INFLUENZA 12/11/2014, 10/09/2015, 12/21/2016, 09/28/2017   • Influenza Quadrivalent, 6-35 Months IM 12/21/2016   • Influenza, recombinant, quadrivalent,injectable, preservative free 12/04/2018, 11/01/2019, 10/23/2020, 01/24/2022, 11/07/2022   • Influenza, seasonal, injectable 12/11/2014, 10/09/2015, 09/28/2017   • Pneumococcal Polysaccharide PPV23 12/04/2018         CHIEF COMPLAINT     Chief Complaint   Patient presents with   • Follow-up     ENDO      HISTORY OF PRESENT ILLNESS     Mr Pham Ladd is a 42-year-old male with past medical history of type 2 diabetes mellitus with long-term use of insulin  Patient presents in office for follow-up of this condition  Home glucose monitoring is typically performed once daily in the morning  Patient was last seen by endocrinologist on 06/14/2022  At that time A1c was 6 8%, though in context of CKD  Insulin regimen at that time was recorded as Lantus 50 units twice daily, Humalog 28 units 3 times daily although patient was usually only taking Humalog once daily with dinner as it was his only meal   Patient was also taking Trulicity 1 5 mg weekly  Patient was injecting into abdomen and rotating sites  Patient saw PCP last week and A1c on 02/24/2023 was 7 1% from that 6 8% previously  He also had microalbumin to creatinine ratio performed which was also at 253  He is compliant with his ACE inhibitor and atorvastatin  Fructosamine was ordered at last visit but not performed  At PCP visit, patient reported that he has not been taking his Lantus since July  Patient has also not been taking lispro and is only been compliant with his Trulicity every week  This was confirmed at today's appointment as patient states he has not had any insulin use in the last 3 months  Patient states his fasting blood sugars in the a m  are typically   He did not bring any insulin logs or his glucometer to visit today  He denies any hypoglycemic episodes or symptoms    He also states that the highest he has seen in the past few months has been 175  Plan with PCP was to restart Lantus 50 units nightly and monitor a m  blood sugar and adjust as indicated  However, patient's A1c is stable and fasting blood sugars has been stable without any insulin use  Discussed with patient today and recommended that patient stop insulin use and continue Trulicity  Also reviewed with patient that we would request that he check his insulin before and 2 hours after 1 meal each day  He should switch which we will he does this with a daily  For 1 to 2 weeks patient was instructed to send this log back to clinic  Patient was provided with blood sugar log at this appointment  Based on patient's blood glucose readings, can further manage insulin as patient may only need short acting insulin with meals  Patient's continue Trulicity and to follow-up in 3 to 4 months  Patient understood and agreed with this plan  REVIEW OF SYSTEMS     Review of Systems   Constitutional: Negative for activity change, chills, fatigue and fever  HENT: Negative for congestion and rhinorrhea  Eyes: Negative for pain and redness  Respiratory: Negative for cough, shortness of breath and wheezing  Gastrointestinal: Negative for abdominal distention, abdominal pain, constipation, diarrhea, nausea and vomiting  Endocrine: Negative for polydipsia and polyuria  Genitourinary: Negative for difficulty urinating and dysuria  Musculoskeletal: Negative for arthralgias and back pain  Skin: Negative for rash and wound  Neurological: Negative for dizziness, syncope, weakness, light-headedness and headaches  Psychiatric/Behavioral: Negative for agitation, behavioral problems and confusion       OBJECTIVE     Vitals:    03/01/23 0947   BP: 122/85   BP Location: Right arm   Patient Position: Sitting   Cuff Size: Standard   Pulse: 96   Temp: 98 2 °F (36 8 °C)   TempSrc: Temporal   SpO2: 99%   Weight: 131 kg (289 lb 4 8 oz)   Height: 5' 6" (1 676 m)     Physical Exam  Constitutional:       General: He is not in acute distress  Appearance: Normal appearance  He is obese  He is not ill-appearing  Cardiovascular:      Rate and Rhythm: Normal rate and regular rhythm  Pulses: Normal pulses  Heart sounds: Normal heart sounds  No murmur heard  Pulmonary:      Effort: Pulmonary effort is normal  No respiratory distress  Breath sounds: Normal breath sounds  No wheezing, rhonchi or rales  Abdominal:      General: Abdomen is flat  Bowel sounds are normal  There is no distension  Palpations: Abdomen is soft  Tenderness: There is no abdominal tenderness  Musculoskeletal:      Right lower leg: No edema  Left lower leg: No edema  Neurological:      Mental Status: He is alert and oriented to person, place, and time  Psychiatric:         Mood and Affect: Mood normal          Behavior: Behavior normal          Thought Content:  Thought content normal        CURRENT MEDICATIONS     Current Outpatient Medications:   •  ammonium lactate (LAC-HYDRIN) 12 % cream, Apply topically as needed for dry skin, Disp: 385 g, Rfl: 0  •  atorvastatin (LIPITOR) 20 mg tablet, Take 1 tablet (20 mg total) by mouth daily, Disp: 90 tablet, Rfl: 3  •  benztropine (COGENTIN) 2 mg tablet, Take 1 tab po bid, Disp: 180 tablet, Rfl: 0  •  buPROPion (WELLBUTRIN) 100 mg tablet, Take 1 tablet (100 mg total) by mouth 2 (two) times a day Take bid--(once in the AM and once at 12 noon), Disp: 180 tablet, Rfl: 0  •  busPIRone (BUSPAR) 15 mg tablet, Take 1 tablet (15 mg total) by mouth 2 (two) times a day, Disp: 180 tablet, Rfl: 0  •  clonazePAM (KlonoPIN) 1 mg tablet, Take 1 tab po qd prn anxiety or panic attacks, Disp: 30 tablet, Rfl: 2  •  gabapentin (NEURONTIN) 300 mg capsule, TAKE 1 CAPSULE(300 MG) BY MOUTH TWICE DAILY, Disp: 180 capsule, Rfl: 3  •  guaiFENesin (MUCINEX) 600 mg 12 hr tablet, Take 2 tablets (1,200 mg total) by mouth every 12 (twelve) hours for 5 days, Disp: 20 tablet, Rfl: 0  •  HumaLOG KwikPen 100 units/mL injection pen, INJECT 28 UNITS THREE TIMES DAILY WITH MEALS PLUS SCALE UP  UNITS DAILY, Disp: 105 mL, Rfl: 0  •  Insulin Glargine Solostar (Lantus SoloStar) 100 UNIT/ML SOPN, Inject 50u once a day , Disp: 15 mL, Rfl: 3  •  Insulin Pen Needle 31G X 8 MM MISC, Check sugars three times a day, Disp: 100 each, Rfl: 1  •  INSULIN SYRINGE  5CC/29G 29G X 1/2" 0 5 ML MISC, by Does not apply route, Disp: , Rfl:   •  lisinopril-hydrochlorothiazide (PRINZIDE,ZESTORETIC) 20-25 MG per tablet, Take 1 tablet by mouth daily, Disp: 90 tablet, Rfl: 1  •  lurasidone (Latuda) 120 mg tablet, Take 1 tablet (120 mg total) by mouth daily with dinner, Disp: 90 tablet, Rfl: 0  •  omeprazole (PriLOSEC) 20 mg delayed release capsule, Take 1 capsule (20 mg total) by mouth daily, Disp: 90 capsule, Rfl: 3  •  traZODone (DESYREL) 50 mg tablet, Take 1/2 to 1 tab po qhs prn insomnia, Disp: 90 tablet, Rfl: 0  •  Trulicity 1 5 DL/9 1ZH SOPN, INJECT ONCE WEEKLY, Disp: 6 mL, Rfl: 1  •  ammonium lactate (LAC-HYDRIN) 12 % cream, Apply topically as needed for dry skin (Patient not taking: Reported on 2/24/2023), Disp: 385 g, Rfl: 0  •  ascorbic acid (VITAMIN C) 250 mg tablet, Take 1 tablet (250 mg total) by mouth daily (Patient not taking: Reported on 11/16/2021), Disp: 90 tablet, Rfl: 3  •  Blood Glucose Monitoring Suppl (FREESTYLE LITE) ANTONIA, by Does not apply route 3 (three) times a day (Patient not taking: Reported on 11/21/2022), Disp: 1 each, Rfl: 0  •  Blood Glucose Monitoring Suppl (FreeStyle Lite) w/Device KIT, USE AS INSTRUCTED 4 TIMES A DAY (Patient not taking: Reported on 2/24/2023), Disp: 1 kit, Rfl: 0  •  cholecalciferol (VITAMIN D3) 1,000 units tablet, Take 1 tablet (1,000 Units total) by mouth in the morning   (Patient not taking: Reported on 5/23/2022), Disp: 30 tablet, Rfl: 0  •  Continuous Blood Gluc  (Clean Platese 14 Day [place]) ANTONIA, Use  to check BG at least 4 times a day (Patient not taking: Reported on 2/21/2022), Disp: 1 each, Rfl: 0  •  Continuous Blood Gluc Sensor (FreeStyle [place] 14 Day Sensor) [facility], Apply sensor every 14 days to check BG at least 4 times a day (Patient not taking: Reported on 2/21/2022), Disp: 2 each, Rfl: 5  •  dextromethorphan-guaiFENesin (ROBITUSSIN DM)  mg/5 mL syrup, Take 5 mL by mouth 4 (four) times a day as needed for cough or congestion (Patient not taking: Reported on 5/23/2022), Disp: 473 mL, Rfl: 1  •  ferrous sulfate (EQL Iron Supplement Therapy) 325 (65 Fe) mg tablet, Take 1 tablet (325 mg total) by mouth daily with breakfast (Patient not taking: Reported on 11/16/2021), Disp: 90 tablet, Rfl: 2  •  fluticasone (FLONASE) 50 mcg/act nasal spray, 1 spray into each nostril daily (Patient not taking: Reported on 2/21/2022), Disp: 18 2 mL, Rfl: 1  •  FREESTYLE LITE test strip, CHECK BLOOD SUGARS FOUR TIMES DAILY (Patient not taking: Reported on 11/21/2022), Disp: 400 strip, Rfl: 3  •  glucose monitoring kit (FREESTYLE) monitoring kit, Use 1 each 4 (four) times a day Fine to substitute other brand if not covered by insurance (Patient not taking: Reported on 2/15/2023), Disp: 1 each, Rfl: 0  •  Lancets (FREESTYLE) lancets, USE THREE TIMES DAILY AS DIRECTED (Patient not taking: Reported on 11/21/2022), Disp: 300 each, Rfl: 0  •  sodium chloride (OCEAN) 0 65 % nasal spray, 1 spray into each nostril daily at bedtime (Patient not taking: Reported on 6/14/2022), Disp: 104 mL, Rfl: 2    PAST MEDICAL & SURGICAL HISTORY     Past Medical History:   Diagnosis Date   • ADHD, adult residual type    • Anxiety    • Anxiety    • Bipolar 1 disorder (HCC)    • Cataract     Left eye   • Depression    • Depression    • Diabetes mellitus (HCC)     blood sugar 167 on admission   • Equinus deformity of foot    • GERD (gastroesophageal reflux disease)    • Homicidal ideations    • Hyperlipidemia    • Hypertension    • Microalbuminuria    • Morbid obesity ([facility] [address] )    • Neuropathy    • Shortness of breath    • Sleep apnea     CPAP at bedtime   • Sleep apnea    • Stroke [facility])    • Suicidal intent      Past Surgical History:   Procedure Laterality Date   • CATARACT EXTRACTION     • CATARACT EXTRACTION     • HAND SURGERY Right    • OTHER SURGICAL HISTORY      REPAIR OF SUPERFICIAL WOUND FACE   • NE ESOPHAGOGASTRODUODENOSCOPY TRANSORAL DIAGNOSTIC N/A 2018    Procedure: ESOPHAGOGASTRODUODENOSCOPY (EGD); Surgeon: Sosa Sotelo MD;  Location: [facility]; Service: Gastroenterology   • NE ESOPHAGOGASTRODUODENOSCOPY TRANSORAL DIAGNOSTIC N/A 2018    Procedure: EGD AND COLONOSCOPY;  Surgeon: Sosa Sotelo MD;  Location[facility]; Service: Gastroenterology     SOCIAL & FAMILY HISTORY     Social History     Socioeconomic History   • Marital status: /Civil Union     Spouse name: Not on file   • Number of children: 1   • Years of education: Not on file   • Highest education level: Not on file   Occupational History   • Occupation: On disability   Tobacco Use   • Smoking status: Former     Types: Cigarettes     Quit date:      Years since quittin 1   • Smokeless tobacco: Former     Types: Chew   • Tobacco comments:     pt "Quit after approx over 25 years ago"   Vaping Use   • Vaping Use: Never used   Substance and Sexual Activity   • Alcohol use: Yes     Comment: rarely   • Drug use: Not Currently     Comment: quit 20 years ago   • Sexual activity: Yes     Partners: Female     Birth control/protection: None     Comment: steady girlfriend   Other Topics Concern   • Not on file   Social History Narrative     from spouse, technically still  but living with other partner, partner's father, and early 2019, his partner's daughter and boyfriend moved in with their two children  Then the boyfriend moved out in 2019  Then partner's daughter moved out approx 2021  (Pt's partner has guardianship of the grandchildren per Pt)  Children: 1 stepdaughter         Education:    Pt denies any hx of learning disabilities and reached childhood milestones on time as far as he knows  He wore braces for equinovarus but states he did not suffer delay in walking    Graduated High School --he was held back 3 times because "I was just lazy, didn't do the work "    No college    Took some core classes in [employer] and worked as a volunteer  from approx [phone]             Substance Abuse History:     Pt drinks ETOH approx q 3-4 months but denies any h/o ETOH abuse  Pt tried smoking Crack once in the past and has been smoking THC once q 2-3 months since 12y/o  He denies other drug use, IVDA, addictions or drug abuse  Social Determinants of Health     Financial Resource Strain: Low Risk    • Difficulty of Paying Living Expenses: Not hard at all   Food Insecurity: No Food Insecurity   • Worried About Running Out of Food in the Last Year: Never true   • Ran Out of Food in the Last Year: Never true   Transportation Needs: No Transportation Needs   • Lack of Transportation (Medical): No   • Lack of Transportation (Non-Medical):  No   Physical Activity: Not on file   Stress: Not on file   Social Connections: Not on file   Intimate Partner Violence: Not on file   Housing Stability: Low Risk    • Unable to Pay for Housing in the Last Year: No   • Number of Places Lived in the Last Year: 1   • Unstable Housing in the Last Year: No     Social History     Substance and Sexual Activity   Alcohol Use Yes    Comment: rarely       Social History     Substance and Sexual Activity   Drug Use Not Currently    Comment: quit 20 years ago     Social History     Tobacco Use   Smoking Status Former   • Types: Cigarettes   • Quit date:    • Years since quittin 1   Smokeless Tobacco Former   • Types: Chew   Tobacco Comments    pt "Quit after approx over 25 years ago"     Family History   Problem Relation Age of Onset   • Diabetes Mother • Alcohol abuse Father    • Diabetes Father    • Hypertension Father    • Lung cancer Father    • Stroke Father    • Cancer Father         lung   • Alcohol abuse Sister    • Depression Sister    • Lymphoma Sister    • Alcohol abuse Family    • Alcohol abuse Sister    • Alcohol abuse Sister    • Cancer Sister    • Heart disease Neg Hx    • Thyroid disease Neg Hx      ==  Claudette Pepper, DO  Internal Medicine Residency, PGY-2  [address]   [facility] , [address] [address] [facility] [address], [address] [facility]  Office: [phone]  Fax: [phone]

## 2023-03-01 NOTE — PATIENT INSTRUCTIONS
1  )  Low 2 g sodium diet    2 )  Monitor weights at home    3 )  Avoid NSAIDs (ibuprofen, Motrin, Advil, Aleve, naproxen)    4 )  Monitor blood pressure at home, call if blood pressure greater than 150/90 persistently    5 ) I will plan to discuss all results including blood work, and/or imaging at our next visit, unless there is an urgent indication, in which case I will call you earlier  If you have any questions or concerns about your results, please feel free to call our office      6 ) Eat green leafy veggies for more iron

## 2023-03-02 ENCOUNTER — TELEPHONE (OUTPATIENT)
Dept: INTERNAL MEDICINE CLINIC | Facility: CLINIC | Age: 58
End: 2023-03-02

## 2023-03-02 NOTE — TELEPHONE ENCOUNTER
Patient would like to know why his Lisinopril has been discontinued  Please contact patient with plan of care  Thank you

## 2023-03-02 NOTE — TELEPHONE ENCOUNTER
LMOM to c/b, lisinopril-hydrochlorothiazide (PRINZIDE,ZESTORETIC) 20-12 5 MG per tablet was sent to the pharmacy from his Nephrologist Dr Tasha Small  Unclear if he is referring to another medication

## 2023-03-03 DIAGNOSIS — R29.818 EXTRAPYRAMIDAL SYMPTOM: ICD-10-CM

## 2023-03-05 RX ORDER — BENZTROPINE MESYLATE 2 MG/1
TABLET ORAL
Qty: 180 TABLET | Refills: 0 | Status: SHIPPED | OUTPATIENT
Start: 2023-03-05

## 2023-03-06 NOTE — TELEPHONE ENCOUNTER
Spoke to patient and advised him that his nephrologists sent medication but doesn't want him to start until June 1st  Patient was unsure why, advised him to call their office for clarification  Patient understood and had no further questions or concerns

## 2023-03-20 ENCOUNTER — SOCIAL WORK (OUTPATIENT)
Dept: BEHAVIORAL/MENTAL HEALTH CLINIC | Facility: CLINIC | Age: 58
End: 2023-03-20

## 2023-03-20 DIAGNOSIS — F41.0 PANIC ATTACKS: ICD-10-CM

## 2023-03-20 DIAGNOSIS — G47.00 INSOMNIA, UNSPECIFIED TYPE: ICD-10-CM

## 2023-03-20 DIAGNOSIS — F31.62 BIPOLAR DISORDER, CURRENT EPISODE MIXED, MODERATE (HCC): Primary | ICD-10-CM

## 2023-03-20 DIAGNOSIS — F41.1 GENERALIZED ANXIETY DISORDER: ICD-10-CM

## 2023-03-20 NOTE — BH CRISIS PLAN
Client Name: Eveline Mccarty       Client YOB: 1965  : 1965    Treatment Team (include name and contact information):     Psychotherapist: Melissa Abdi LCSW    Psychiatrist: Kevin Álvarez   Release of information completed: n/a  " n/a   Release of information completed: no    Other (Specify Role): n/a    Release of information completed: no    Other (Specify Role): n/a   Release of information completed: no    Healthcare Provider  Raza Yi MD  [place] [address]  [phone]    Type of Plan   * Child plans (children 15 yo and younger) must be completed and signed by the child's legal guardian   * Plans for all individuals 15 yo and above must be signed by the client  Plan Type: adolescent/adult (15 and over) Initial      My Personal Strengths are (in the client's own words):  Kind, gentle, very loving    The stressors and triggers that may put me at risk are:  people (describe - names, etc) my father in law Bethany Maria can be very difficult  Coping skills I can use to keep myself calm and safe: Other (describe) talking to you my therapist     Coping skills/supports I can use to maintain abstinence from substance use:   n/a    The people that provide me with help and support: (Include name, contact, and how they can help)   Support person #1: Jessica Harding my partner    * Phone number: [phone]    * How can they help me? Advice, guidance , support   Support person #2:Vahe Chaparro    * Phone number: [phone]    * How can they help me?  Support, strategies     Support person #3: n/a    * Phone number: n/a    * How can they help me? n/a    In the past, the following has helped me in times of crisis:    Being in a quiet space, Talking to a professional on the telephone, Listening to music and Watching television or a movie      If it is an emergency and you need immediate help, call     If there is a possibility of danger to yourself or others, call the following crisis hotline resources:     Adult Crisis Numbers  Suicide Prevention Hotline - Dial 9-8-8  [facility]: [address]: [address]: [phone]  [address] ([facility]): [phone]  [facility]: [address] [place] [address] Avenue: [address] [place] [address] St: [phone] (daytime)  [phone] (after hours, weekends, holidays)     Child/Adolescent Crisis Numbers   formerly [facility]: [address]: [phone]   Vincenzo Thapa: [phone]   [address] [facility] ([facility]): [phone]    Please note: Some [place] do not have a separate number for Child/Adolescent specific crisis  If your county is not listed under Child/Adolescent, please call the adult number for your county     National Talk to Text Line   All Ages - [phone]    In the event your feelings become unmanageable, and you cannot reach your support system, you will call 911 immediately or go to the nearest hospital emergency room

## 2023-03-20 NOTE — PSYCH
Behavioral Health Psychotherapy Progress Note    Psychotherapy Provided: Individual Psychotherapy     1  Bipolar disorder, current episode mixed, moderate ([address] )        2  Panic attacks        3  Insomnia, unspecified type        4  Generalized anxiety disorder            Goals addressed in session: Goal 1 and Goal 2     DATA: Manisha Sellers discussed how difficult his father in law can be  Manisha Sellers admits he has depression but denies self harm   He shared life can depress him and he feels it is at a 7  His anxiety is a 5  We worked on strategies to deal with this  During this session, this clinician used the following therapeutic modalities: Client-centered Therapy, Cognitive Behavioral Therapy, Mindfulness-based Strategies and Supportive Psychotherapy    Substance Abuse was not addressed during this session  If the client is diagnosed with a co-occurring substance use disorder, please indicate any changes in the frequency or amount of use: n/a  Stage of change for addressing substance use diagnoses: No substance use/Not applicable    ASSESSMENT:  Aleida Garcia presents with a Euthymic/ normal mood  his affect is Normal range and intensity, which is congruent, with his mood and the content of the session  The client has made progress on their goals  Manisha Sellers continues to have issues with his father in law Aleida Garcia presents with a none risk of suicide, none risk of self-harm, and none risk of harm to others  For any risk assessment that surpasses a "low" rating, a safety plan must be developed  A safety plan was indicated: no  If yes, describe in detail n/a    PLAN: Between sessions, Aleida Garcia will use mindfulness and CBT  At the next session, the therapist will use Client-centered Therapy, Cognitive Behavioral Therapy, Mindfulness-based Strategies and Supportive Psychotherapy to address issues and symptoms as they may arise       [address] and Discharge Planning: Love Jose is aware of and agrees to continue to work on their treatment plan  They have identified and are working toward their discharge goals   yes    Visit start and stop times:    03/20/23  Start Time: 1510  Stop Time: 1600  Total Visit Time: 50 minutes

## 2023-03-29 DIAGNOSIS — F41.0 PANIC ATTACKS: ICD-10-CM

## 2023-03-29 RX ORDER — CLONAZEPAM 1 MG/1
TABLET ORAL
Qty: 30 TABLET | Refills: 0 | Status: SHIPPED | OUTPATIENT
Start: 2023-03-29

## 2023-04-04 ENCOUNTER — TELEPHONE (OUTPATIENT)
Dept: INTERNAL MEDICINE CLINIC | Facility: CLINIC | Age: 58
End: 2023-04-04

## 2023-04-05 DIAGNOSIS — E11.65 TYPE 2 DIABETES MELLITUS WITH HYPERGLYCEMIA, UNSPECIFIED WHETHER LONG TERM INSULIN USE (HCC): Primary | ICD-10-CM

## 2023-04-05 NOTE — TELEPHONE ENCOUNTER
Done
Please place an updated referral to Podiatry  Patient has a follow up scheduled on 04/07/23  Thank you 
Please place referral
denies pain/discomfort

## 2023-04-06 ENCOUNTER — OFFICE VISIT (OUTPATIENT)
Dept: PODIATRY | Facility: CLINIC | Age: 58
End: 2023-04-06

## 2023-04-06 VITALS
BODY MASS INDEX: 45.64 KG/M2 | WEIGHT: 284 LBS | HEIGHT: 66 IN | DIASTOLIC BLOOD PRESSURE: 75 MMHG | HEART RATE: 94 BPM | SYSTOLIC BLOOD PRESSURE: 106 MMHG

## 2023-04-06 DIAGNOSIS — E11.65 TYPE 2 DIABETES MELLITUS WITH HYPERGLYCEMIA, UNSPECIFIED WHETHER LONG TERM INSULIN USE (HCC): ICD-10-CM

## 2023-04-06 DIAGNOSIS — E11.42 DIABETIC POLYNEUROPATHY ASSOCIATED WITH TYPE 2 DIABETES MELLITUS (HCC): Primary | ICD-10-CM

## 2023-04-06 NOTE — PROGRESS NOTES
"Assessment/Plan:    Discussed principles of diabetic foot care  Vascular status is within normal limits and sensorium is intact  All toenails are elongated  No evidence of nail fungus  Treatment consisted of nail trimming  Patient will be reassessed in 3 months  No problem-specific Assessment & Plan notes found for this encounter  Diagnoses and all orders for this visit:    Diabetic polyneuropathy associated with type 2 diabetes mellitus ([facility] )    Type 2 diabetes mellitus with hyperglycemia, unspecified whether long term insulin use ([facility] )  -     Ambulatory Referral to Podiatry          Subjective:      Patient ID: Oniel Jean-Baptiste is a 65 y o  male  HPI     Patient, a 55-year-old type II diabetic with known history of neuropathy presents for evaluation and toenail care  Patient relates burning and tingling in his feet  He is taking gabapentin 300 mg twice daily but does not find it helpful  He denies numbness in his feet  All toenails are extremely long  I personally reviewed an A1c dated 2/24/2023  It was 7 1  I personally reviewed an A1c dated 6/10/2022  It was 6 8  The following portions of the patient's history were reviewed and updated as appropriate: allergies, current medications, past family history, past medical history, past social history, past surgical history and problem list     Review of Systems   Genitourinary:        Stage III renal disease   Neurological:        Paresthesia   Psychiatric/Behavioral:        Bipolar              Objective:      /75   Pulse 94   Ht 5' 6\" (1 676 m)   Wt 129 kg (284 lb)   BMI 45 84 kg/m²          Physical Exam  Cardiovascular:      Pulses: no weak pulses          Dorsalis pedis pulses are 2+ on the right side and 2+ on the left side  Posterior tibial pulses are 2+ on the right side and 2+ on the left side  Feet:      Right foot:      Skin integrity: No ulcer, skin breakdown, erythema, warmth, callus or dry skin        " Left foot:      Skin integrity: No ulcer, skin breakdown, erythema, warmth, callus or dry skin  Diabetic Foot Exam    Patient's shoes and socks removed  Right Foot/Ankle   Right Foot Inspection  Skin Exam: skin normal and skin intact  No dry skin, no warmth, no callus, no erythema, no maceration, no abnormal color, no pre-ulcer, no ulcer and no callus  Toe Exam: ROM and strength within normal limits  Sensory   Vibration: intact  Proprioception: intact  Monofilament testing: intact    Vascular  Capillary refills: < 3 seconds  The right DP pulse is 2+  The right PT pulse is 2+  Right Toe  - Comprehensive Exam  Ecchymosis: none  Arch: normal  Hammertoes: absent  Claw Toes: absent  Swelling: none   Tenderness: none         Left Foot/Ankle  Left Foot Inspection  Skin Exam: skin normal and skin intact  No dry skin, no warmth, no erythema, no maceration, normal color, no pre-ulcer, no ulcer and no callus  Toe Exam: ROM and strength within normal limits  Sensory   Vibration: intact  Proprioception: intact  Monofilament testing: intact    Vascular  Capillary refills: < 3 seconds  The left DP pulse is 2+  The left PT pulse is 2+       Left Toe  - Comprehensive Exam  Ecchymosis: none  Arch: normal  Hammertoes: absent  Claw toes: absent  Swelling: none   Tenderness: none           Assign Risk Category  No deformity present  No loss of protective sensation  No weak pulses  Risk: 0

## 2023-05-13 DIAGNOSIS — Z79.4 TYPE 2 DIABETES MELLITUS WITH HYPERGLYCEMIA, WITH LONG-TERM CURRENT USE OF INSULIN (HCC): ICD-10-CM

## 2023-05-13 DIAGNOSIS — E11.65 TYPE 2 DIABETES MELLITUS WITH HYPERGLYCEMIA, WITH LONG-TERM CURRENT USE OF INSULIN (HCC): ICD-10-CM

## 2023-05-15 RX ORDER — DULAGLUTIDE 1.5 MG/.5ML
INJECTION, SOLUTION SUBCUTANEOUS
Qty: 6 ML | Refills: 1 | Status: SHIPPED | OUTPATIENT
Start: 2023-05-15

## 2023-05-23 ENCOUNTER — TELEPHONE (OUTPATIENT)
Dept: PSYCHIATRY | Facility: CLINIC | Age: 58
End: 2023-05-23

## 2023-05-23 NOTE — TELEPHONE ENCOUNTER
Patient contacted the office seeking to verify his follow up appointment   Writer verified patient follow up appointment is schedule on 5/24/23 at 3:00p

## 2023-05-24 ENCOUNTER — SOCIAL WORK (OUTPATIENT)
Dept: BEHAVIORAL/MENTAL HEALTH CLINIC | Facility: CLINIC | Age: 58
End: 2023-05-24

## 2023-05-24 DIAGNOSIS — G47.00 INSOMNIA, UNSPECIFIED TYPE: ICD-10-CM

## 2023-05-24 DIAGNOSIS — F31.76 BIPOLAR I DISORDER, MOST RECENT EPISODE DEPRESSED, IN FULL REMISSION (HCC): Primary | ICD-10-CM

## 2023-05-24 DIAGNOSIS — F41.1 GENERALIZED ANXIETY DISORDER: ICD-10-CM

## 2023-05-24 DIAGNOSIS — F41.0 PANIC ATTACKS: ICD-10-CM
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
## 2023-06-07 ENCOUNTER — OFFICE VISIT (OUTPATIENT)
Dept: PSYCHIATRY | Facility: CLINIC | Age: 58
End: 2023-06-07

## 2023-06-07 DIAGNOSIS — F41.1 GENERALIZED ANXIETY DISORDER: Primary | Chronic | ICD-10-CM

## 2023-06-07 DIAGNOSIS — G47.00 INSOMNIA, UNSPECIFIED TYPE: ICD-10-CM

## 2023-06-07 DIAGNOSIS — F31.61 BIPOLAR DISORDER, CURRENT EPISODE MIXED, MILD (HCC): ICD-10-CM

## 2023-06-07 DIAGNOSIS — F41.0 PANIC ATTACKS: ICD-10-CM

## 2023-06-07 DIAGNOSIS — R29.818 EXTRAPYRAMIDAL SYMPTOM: ICD-10-CM

## 2023-06-07 RX ORDER — LURASIDONE HYDROCHLORIDE 120 MG/1
120 TABLET, FILM COATED ORAL
Qty: 90 TABLET | Refills: 1 | Status: SHIPPED | OUTPATIENT
Start: 2023-06-07

## 2023-06-07 RX ORDER — TRAZODONE HYDROCHLORIDE 50 MG/1
TABLET ORAL
Qty: 90 TABLET | Refills: 1 | Status: SHIPPED | OUTPATIENT
Start: 2023-06-07

## 2023-06-07 RX ORDER — CLONAZEPAM 1 MG/1
TABLET ORAL
Qty: 30 TABLET | Refills: 3 | Status: SHIPPED | OUTPATIENT
Start: 2023-06-07

## 2023-06-07 RX ORDER — BUSPIRONE HYDROCHLORIDE 30 MG/1
30 TABLET ORAL 2 TIMES DAILY
Qty: 180 TABLET | Refills: 1 | Status: SHIPPED | OUTPATIENT
Start: 2023-06-07 | End: 2023-09-05

## 2023-06-07 RX ORDER — BUPROPION HYDROCHLORIDE 100 MG/1
100 TABLET ORAL 2 TIMES DAILY
Qty: 180 TABLET | Refills: 1 | Status: SHIPPED | OUTPATIENT
Start: 2023-06-07 | End: 2023-09-05

## 2023-06-07 RX ORDER — BENZTROPINE MESYLATE 2 MG/1
TABLET ORAL
Qty: 180 TABLET | Refills: 1 | Status: SHIPPED | OUTPATIENT
Start: 2023-06-07

## 2023-06-21 ENCOUNTER — SOCIAL WORK (OUTPATIENT)
Dept: BEHAVIORAL/MENTAL HEALTH CLINIC | Facility: CLINIC | Age: 58
End: 2023-06-21
Payer: MEDICARE

## 2023-06-21 DIAGNOSIS — F31.76 BIPOLAR I DISORDER, MOST RECENT EPISODE DEPRESSED, IN FULL REMISSION (HCC): Primary | ICD-10-CM

## 2023-06-21 DIAGNOSIS — G47.00 INSOMNIA, UNSPECIFIED TYPE: ICD-10-CM

## 2023-06-21 DIAGNOSIS — F41.0 PANIC ATTACKS: ICD-10-CM

## 2023-06-21 DIAGNOSIS — F41.1 GENERALIZED ANXIETY DISORDER: ICD-10-CM

## 2023-06-21 PROCEDURE — 90834 PSYTX W PT 45 MINUTES: CPT | Performed by: SOCIAL WORKER

## 2023-06-21 NOTE — PSYCH
"Behavioral Health Psychotherapy Progress Note    Psychotherapy Provided: Individual Psychotherapy     1  Bipolar I disorder, most recent episode depressed, in full remission ([facility] [address] )        2  Panic attacks        3  Insomnia, unspecified type        4  Generalized anxiety disorder            Goals addressed in session: Goal 1     DATA: Raghavendra Arthur said even though things are going well he still has some depression and anxiety  Raghavendra Arthur shared his female partner finally told her dad to back off of Raghavendra Arthur  Things are more peaceful in the house  He had a birthday yesterday and discussed how it was spent  Palencia Jesse shared his great niece has improved with her mental health issues  I provided support and strategies to cope  During this session, this clinician used the following therapeutic modalities: Client-centered Therapy, Cognitive Behavioral Therapy, Mindfulness-based Strategies and Supportive Psychotherapy    Substance Abuse was not addressed during this session  If the client is diagnosed with a co-occurring substance use disorder, please indicate any changes in the frequency or amount of use: n/a  Stage of change for addressing substance use diagnoses: No substance use/Not applicable    ASSESSMENT:  Leatha Perez presents with a Anxious mood  his affect is anxious, which is congruent, with his mood and the content of the session  The client has made progress on their goals  Leatha Perez presents with a none risk of suicide, none risk of self-harm, and none risk of harm to others  For any risk assessment that surpasses a \"low\" rating, a safety plan must be developed  A safety plan was indicated: no  If yes, describe in detail n/a    PLAN: Between sessions, Leatha Perez will use mindfulness and CBT  At the next session, the therapist will use Client-centered Therapy, Cognitive Behavioral Therapy, Mindfulness-based Strategies and Supportive Psychotherapy to address issues as they may arise   "      Behavioral Health Treatment Plan and Discharge Planning: Angela Turner is aware of and agrees to continue to work on their treatment plan  They have identified and are working toward their discharge goals   yes    Visit start and stop times:    06/21/23  Start Time: 1410  Stop Time: 1500  Total Visit Time: 50 minutes

## 2023-06-27 ENCOUNTER — TELEPHONE (OUTPATIENT)
Dept: INTERNAL MEDICINE CLINIC | Facility: CLINIC | Age: 58
End: 2023-06-27

## 2023-06-30 DIAGNOSIS — E11.9 TYPE 2 DIABETES MELLITUS (HCC): Primary | ICD-10-CM

## 2023-07-05 ENCOUNTER — OFFICE VISIT (OUTPATIENT)
Dept: MULTI SPECIALTY CLINIC | Facility: CLINIC | Age: 58
End: 2023-07-05

## 2023-07-05 VITALS
DIASTOLIC BLOOD PRESSURE: 84 MMHG | OXYGEN SATURATION: 98 % | WEIGHT: 271 LBS | BODY MASS INDEX: 43.74 KG/M2 | HEART RATE: 78 BPM | SYSTOLIC BLOOD PRESSURE: 137 MMHG | TEMPERATURE: 98.6 F

## 2023-07-05 DIAGNOSIS — I12.9 TYPE 2 DIABETES MELLITUS WITH STAGE 3 CHRONIC KIDNEY DISEASE AND HYPERTENSION (HCC): Primary | ICD-10-CM

## 2023-07-05 DIAGNOSIS — E11.9 TYPE 2 DIABETES MELLITUS (HCC): ICD-10-CM

## 2023-07-05 DIAGNOSIS — E11.42 DIABETIC POLYNEUROPATHY ASSOCIATED WITH TYPE 2 DIABETES MELLITUS (HCC): ICD-10-CM

## 2023-07-05 DIAGNOSIS — N18.30 TYPE 2 DIABETES MELLITUS WITH STAGE 3 CHRONIC KIDNEY DISEASE AND HYPERTENSION (HCC): Primary | ICD-10-CM

## 2023-07-05 DIAGNOSIS — E78.2 MIXED HYPERLIPIDEMIA: ICD-10-CM

## 2023-07-05 DIAGNOSIS — E66.01 CLASS 3 SEVERE OBESITY DUE TO EXCESS CALORIES WITH SERIOUS COMORBIDITY AND BODY MASS INDEX (BMI) OF 45.0 TO 49.9 IN ADULT (HCC): ICD-10-CM

## 2023-07-05 DIAGNOSIS — E11.22 TYPE 2 DIABETES MELLITUS WITH STAGE 3 CHRONIC KIDNEY DISEASE AND HYPERTENSION (HCC): Primary | ICD-10-CM

## 2023-07-05 DIAGNOSIS — E55.9 VITAMIN D DEFICIENCY: ICD-10-CM

## 2023-07-05 DIAGNOSIS — N18.32 STAGE 3B CHRONIC KIDNEY DISEASE (HCC): ICD-10-CM

## 2023-07-05 LAB — SL AMB POCT HEMOGLOBIN AIC: 5.7 (ref ?–6.5)

## 2023-07-05 PROCEDURE — 99214 OFFICE O/P EST MOD 30 MIN: CPT | Performed by: STUDENT IN AN ORGANIZED HEALTH CARE EDUCATION/TRAINING PROGRAM

## 2023-07-05 PROCEDURE — 83036 HEMOGLOBIN GLYCOSYLATED A1C: CPT | Performed by: STUDENT IN AN ORGANIZED HEALTH CARE EDUCATION/TRAINING PROGRAM

## 2023-07-05 NOTE — PROGRESS NOTES
Patient is a 63yM with G2PH on Trulicity only with microalbuminuria  without any macrovascular complications with other PMhx of hypertension, hyperlipidemia and obesity, bipolar Dx who presents for follow up today. Subjective- patient reports has been having issues with food not tasting good and hence does not eat much. Reports limited food intake and with this has been losing more weight- 15lbs since last visit. Does not check BG anymore. Denies any polyuria, polydipsia, neuropathy, retinopathy issues. Does have cataract and thus limited vision. Currently on Trulicity 5.1CE weekly only, self d/c insulin in past.     ROS: see above and Resident note  Medications and allergies reviewed   Vital signs reviewed     Physical Exam:  General: No acute distress. Alert and awake. HEENT: Normocephalic, atraumatic. Cardiac: No lower extremity edema. Pulmonary: No respiratory distress. Neuro: Moves all extremities spontaneously. Data: reviewed pertinent data  POC Glucose (mg/dl)   Date Value   09/12/2018 128   06/14/2018 167 (H)      Latest Reference Range & Units 02/24/23 11:00 07/05/23 12:16   Hemoglobin A1C 6.5  7.1 ! 5.7   !: Data is abnormal     Plan     Patient is a 63yM with T2DM previously on insulin, self d/c since being on Trulicity and having a lot of weight loss since. Hba1C in office today at 5.7% which is close to prediabetic range. Most likely controlled diabetes d/t weight loss. Discussed that would still check BG once a week and PRN to make sure if diabetes getting worse can recognize early. No change in regime for now. Repeat labs in 3 months. Please follow up with retinopathy screening- overdue. Microalbuminuria- Diabetes under well control, patient in ACE inhibitor. BP goal <130/80. Cw follow up with his nephrologist. Can also consider SGLT-2 antagonist in future for CKD prevention    Hyperlipidemia- LDL <100 04/22, repeat will order.  c/w Lipitor 20mg daily     RTC in 3 months

## 2023-07-05 NOTE — PROGRESS NOTES
[address] [place] [address]  ENDOCRINOLOGY OFFICE VISIT     PATIENT INFORMATION     Bhupinder Garcia   63 y.o. male   MRN: [id number]    ASSESSMENT/PLAN     Diagnoses and all orders for this visit:    Type 2 diabetes mellitus with stage 3 chronic kidney disease and hypertension (HCC)  POCT A1c down to 5.7% while not on taking insulin. Will continue to hold off insulin therapy at this time and continue with Trulicity. Continues to have weight loss but also has been eating less. Recommend checking sugars once a week and calling clinic if symptoms of hypoglycemia or if sugars start to climb. Repeat A1c in 3 months.  -     POCT hemoglobin A1c  -     HEMOGLOBIN A1C W/ EAG ESTIMATION; Future    Stage 3b chronic kidney disease (HCC)    Mixed hyperlipidemia    Class 3 severe obesity due to excess calories with serious comorbidity and body mass index (BMI) of 45.0 to 49.9 in [facility])  Continue with Trulicity. Continues to lose weight, doing well overall. Vitamin D deficiency    Schedule a follow-up appointment in 3 months. HEALTH MAINTENANCE     Immunization History   Administered Date(s) Administered   • COVID-19 PFIZER VACCINE 0.3 ML IM 03/30/2021, 04/24/2021   • INFLUENZA 12/11/2014, 10/09/2015, 12/21/2016, 09/28/2017   • Influenza Quadrivalent, 6-35 Months IM 12/21/2016   • Influenza, recombinant, quadrivalent,injectable, preservative free 12/04/2018, 11/01/2019, 10/23/2020, 01/24/2022, 11/07/2022   • Influenza, seasonal, injectable 12/11/2014, 10/09/2015, 09/28/2017   • Pneumococcal Polysaccharide PPV23 12/04/2018     CHIEF COMPLAINT     Chief Complaint   Patient presents with   • Follow-up     ENDO      HISTORY OF PRESENT ILLNESS     Bhupinder Garcia is a 63 y.o. patient with PMH of T2DM previously on insulin, HTN, HLD who presents to the clinic for diabetes follow up. Patient has stopped taking insulin but has continued with Trulicity. Patient continues to lose weight.  Patient does note that food does not taste as good which has lead to a decrease in overall food intake. Denies feeling of early fullness. Has not checked his blood sugars anymore. Denies any lightheadedness, dizziness, chest pain, palpitations, polyuria, polydipsia, neuropathy, blurry vision. REVIEW OF SYSTEMS     Review of Systems   Constitutional: Negative for chills, fatigue and fever. Respiratory: Negative for cough, chest tightness, shortness of breath and wheezing. Cardiovascular: Negative for chest pain, palpitations and leg swelling. Gastrointestinal: Negative for abdominal pain, constipation, diarrhea, nausea and vomiting. Neurological: Negative for dizziness, weakness, light-headedness, numbness and headaches. Psychiatric/Behavioral: Negative. All other systems reviewed and are negative. OBJECTIVE     Vitals:    07/05/23 1202   BP: 137/84   BP Location: Right arm   Patient Position: Sitting   Cuff Size: Standard   Pulse: 78   Temp: 98.6 °F (37 °C)   TempSrc: Temporal   SpO2: 98%   Weight: 123 kg (271 lb)     Physical Exam  Constitutional:       Appearance: Normal appearance. He is obese. HENT:      Head: Normocephalic and atraumatic. Cardiovascular:      Rate and Rhythm: Normal rate and regular rhythm. Pulses: Normal pulses. Pulmonary:      Effort: Pulmonary effort is normal. No respiratory distress. Musculoskeletal:      Right lower leg: No edema. Left lower leg: No edema. Skin:     General: Skin is warm. Neurological:      Mental Status: He is alert.        CURRENT MEDICATIONS     Current Outpatient Medications:   •  ammonium lactate (LAC-HYDRIN) 12 % cream, Apply topically as needed for dry skin (Patient not taking: Reported on 3/1/2023), Disp: 385 g, Rfl: 0  •  ammonium lactate (LAC-HYDRIN) 12 % cream, Apply topically as needed for dry skin (Patient not taking: Reported on 2/24/2023), Disp: 385 g, Rfl: 0  •  ascorbic acid (VITAMIN C) 250 mg tablet, Take 1 tablet (250 mg total) by mouth daily (Patient not taking: Reported on 11/16/2021), Disp: 90 tablet, Rfl: 3  •  atorvastatin (LIPITOR) 20 mg tablet, Take 1 tablet (20 mg total) by mouth daily, Disp: 90 tablet, Rfl: 3  •  benztropine (COGENTIN) 2 mg tablet, TAKE 1 TABLET BY MOUTH TWICE DAILY, Disp: 180 tablet, Rfl: 1  •  Blood Glucose Monitoring Suppl (FREESTYLE LITE) ANTONIA, by Does not apply route 3 (three) times a day (Patient not taking: Reported on 11/21/2022), Disp: 1 each, Rfl: 0  •  Blood Glucose Monitoring Suppl (FreeStyle Lite) w/Device KIT, USE AS INSTRUCTED 4 TIMES A DAY (Patient not taking: Reported on 2/24/2023), Disp: 1 kit, Rfl: 0  •  buPROPion (WELLBUTRIN) 100 mg tablet, Take 1 tablet (100 mg total) by mouth 2 (two) times a day Take bid--(once in the AM and once at 12 noon), Disp: 180 tablet, Rfl: 1  •  busPIRone (BUSPAR) 30 MG tablet, Take 1 tablet (30 mg total) by mouth 2 (two) times a day, Disp: 180 tablet, Rfl: 1  •  cholecalciferol (VITAMIN D3) 1,000 units tablet, Take 1 tablet (1,000 Units total) by mouth in the morning.  (Patient not taking: Reported on 5/23/2022), Disp: 30 tablet, Rfl: 0  •  clonazePAM (KlonoPIN) 1 mg tablet, TAKE 1 TABLET BY MOUTH EVERY DAY AS NEEDED FOR ANXIETY OR PANIC ATTACKS, Disp: 30 tablet, Rfl: 3  •  Continuous Blood Gluc  (FreeStyle Clarence 14 Day [place]ifton) ANTONIA, Use  to check BG at least 4 times a day (Patient not taking: Reported on 2/21/2022), Disp: 1 each, Rfl: 0  •  Continuous Blood Gluc Sensor (FreeStyle Clarence 14 Day Sensor) [facility], Apply sensor every 14 days to check BG at least 4 times a day (Patient not taking: Reported on 2/21/2022), Disp: 2 each, Rfl: 5  •  dextromethorphan-guaiFENesin (ROBITUSSIN DM)  mg/5 mL syrup, Take 5 mL by mouth 4 (four) times a day as needed for cough or congestion (Patient not taking: Reported on 5/23/2022), Disp: 473 mL, Rfl: 1  •  ferrous sulfate (EQL Iron Supplement Therapy) 325 (65 Fe) mg tablet, Take 1 tablet (325 mg total) by mouth daily with breakfast (Patient not taking: Reported on 11/16/2021), Disp: 90 tablet, Rfl: 2  •  fluticasone (FLONASE) 50 mcg/act nasal spray, 1 spray into each nostril daily (Patient not taking: Reported on 2/21/2022), Disp: 18.2 mL, Rfl: 1  •  FREESTYLE LITE test strip, CHECK BLOOD SUGARS FOUR TIMES DAILY (Patient not taking: Reported on 11/21/2022), Disp: 400 strip, Rfl: 3  •  gabapentin (NEURONTIN) 300 mg capsule, TAKE 1 CAPSULE(300 MG) BY MOUTH TWICE DAILY, Disp: 180 capsule, Rfl: 3  •  glucose monitoring kit (FREESTYLE) monitoring kit, Use 1 each 4 (four) times a day Fine to substitute other brand if not covered by insurance (Patient not taking: Reported on 2/15/2023), Disp: 1 each, Rfl: 0  •  Insulin Pen Needle 31G X 8 MM MISC, Check sugars three times a day, Disp: 100 each, Rfl: 1  •  INSULIN SYRINGE .5CC/29G 29G X 1/2" 0.5 ML MISC, by Does not apply route, Disp: , Rfl:   •  Lancets (FREESTYLE) lancets, USE THREE TIMES DAILY AS DIRECTED (Patient not taking: Reported on 11/21/2022), Disp: 300 each, Rfl: 0  •  lisinopril-hydrochlorothiazide (PRINZIDE,ZESTORETIC) 20-12.5 MG per tablet, Take 1 tablet by mouth daily Do not start before June 1, 2023.  (Patient not taking: Reported on 7/5/2023), Disp: 90 tablet, Rfl: 3  •  lurasidone (Latuda) 120 mg tablet, Take 1 tablet (120 mg total) by mouth daily with dinner, Disp: 90 tablet, Rfl: 1  •  omeprazole (PriLOSEC) 20 mg delayed release capsule, Take 1 capsule (20 mg total) by mouth daily, Disp: 90 capsule, Rfl: 3  •  sodium chloride (OCEAN) 0.65 % nasal spray, 1 spray into each nostril daily at bedtime (Patient not taking: Reported on 6/14/2022), Disp: 104 mL, Rfl: 2  •  traZODone (DESYREL) 50 mg tablet, Take 1/2 to 1 tab po qhs prn insomnia, Disp: 90 tablet, Rfl: 1  •  Trulicity 1.5 XA/4.4ZP injection, INJECT ONCE WEEKLY, Disp: 6 mL, Rfl: 1    PAST MEDICAL & SURGICAL HISTORY     Past Medical History:   Diagnosis Date   • ADHD, adult residual type    • Anxiety    • Anxiety    • Bipolar 1 disorder ([address])    • Cataract     Left eye   • Depression    • Depression    • Diabetes mellitus ([address])     blood sugar 167 on admission   • Equinus deformity of foot    • GERD (gastroesophageal reflux disease)    • Homicidal ideations    • Hyperlipidemia    • Hypertension    • Microalbuminuria    • Morbid obesity ([address])    • Neuropathy    • Shortness of breath    • Sleep apnea     CPAP at bedtime   • Sleep apnea    • Stroke [facility])    • Suicidal intent      Past Surgical History:   Procedure Laterality Date   • CATARACT EXTRACTION     • CATARACT EXTRACTION     • HAND SURGERY Right    • OTHER SURGICAL HISTORY      REPAIR OF SUPERFICIAL WOUND FACE   • MS ESOPHAGOGASTRODUODENOSCOPY TRANSORAL DIAGNOSTIC N/A 2018    Procedure: ESOPHAGOGASTRODUODENOSCOPY (EGD); Surgeon: Pedro Palacios MD;  Location: [facility]; Service: Gastroenterology   • MS ESOPHAGOGASTRODUODENOSCOPY TRANSORAL DIAGNOSTIC N/A 2018    Procedure: EGD AND COLONOSCOPY;  Surgeon: Pedro Palacios MD;  Location: [facility];   Service: Gastroenterology     SOCIAL & FAMILY HISTORY     Social History     Socioeconomic History   • Marital status: /Civil Union     Spouse name: Not on file   • Number of children: 1   • Years of education: Not on file   • Highest education level: Not on file   Occupational History   • Occupation: On disability   Tobacco Use   • Smoking status: Former     Types: Cigarettes     Quit date: 1985     Years since quittin.5   • Smokeless tobacco: Former     Types: Chew   • Tobacco comments:     pt "Quit after approx over 25 years ago"   Vaping Use   • Vaping Use: Never used   Substance and Sexual Activity   • Alcohol use: Yes     Comment: rarely   • Drug use: Not Currently     Comment: quit 20 years ago   • Sexual activity: Yes     Partners: Female     Birth control/protection: None     Comment: steady girlfriend   Other Topics Concern   • Not on file   Social History Narrative     from spouse, technically still  but living with other partner, partner's father, and early 9/2019, his partner's daughter and boyfriend moved in with their two children. Then the boyfriend moved out in 9/2019. Then partner's daughter moved out approx 7/2021. (Pt's partner has guardianship of the grandchildren per Pt). Children: 1 stepdaughter         Education:    Pt denies any hx of learning disabilities and reached childhood milestones on time as far as he knows. He wore braces for equinovarus but states he did not suffer delay in walking    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 because "I was just lazy, didn't do the work."    No college    Took some core classes in [employer] and worked as a volunteer  from approx [phone]             Substance Abuse History:     Pt drinks ETOH approx q 3-4 months but denies any h/o ETOH abuse. Pt tried smoking Crack once in the past and has been smoking THC once q 2-3 months since 12y/o. He denies other drug use, IVDA, addictions or drug abuse. Social Determinants of Health     Financial Resource Strain: Low Risk  (2/24/2023)    Overall Financial Resource Strain (CARDIA)    • Difficulty of Paying Living Expenses: Not hard at all   Food Insecurity: No Food Insecurity (2/24/2023)    Hunger Vital Sign    • Worried About Running Out of Food in the Last Year: Never true    • Ran Out of Food in the Last Year: Never true   Transportation Needs: No Transportation Needs (2/24/2023)    PRAPARE - Transportation    • Lack of Transportation (Medical): No    • Lack of Transportation (Non-Medical): No   Physical Activity: Inactive (9/14/2021)    Exercise Vital Sign    • Days of Exercise per Week: 0 days    • Minutes of Exercise per Session: 0 min   Stress: No Stress Concern Present (9/14/2021)    [facility]    • Feeling of Stress : Not at all   Social Connections:  Moderately Isolated (2021)    Social Connection and Isolation Panel [NHANES]    • Frequency of Communication with Friends and Family: More than three times a week    • Frequency of Social Gatherings with Friends and Family: Once a week    • Attends Scientologist Services: Never    • Active Member of Clubs or Organizations: No    • Attends Club or Organization Meetings: Never    • Marital Status: Living with partner   Intimate Partner Violence: Not At Risk (2021)    Humiliation, Afraid, Rape, and Kick questionnaire    • Fear of Current or Ex-Partner: No    • Emotionally Abused: No    • Physically Abused: No    • Sexually Abused: No   Housing Stability: Low Risk  (2022)    Housing Stability Vital Sign    • Unable to Pay for Housing in the Last Year: No    • Number of [address] in the Last Year: 1    • Unstable Housing in the Last Year: No     Social History     Substance and Sexual Activity   Alcohol Use Yes    Comment: rarely       Social History     Substance and Sexual Activity   Drug Use Not Currently    Comment: quit 20 years ago     Social History     Tobacco Use   Smoking Status Former   • Types: Cigarettes   • Quit date:    • Years since quittin.5   Smokeless Tobacco Former   • Types: Chew   Tobacco Comments    pt "Quit after approx over 25 years ago"     Family History   Problem Relation Age of Onset   • Diabetes Mother    • Alcohol abuse Father    • Diabetes Father    • Hypertension Father    • Lung cancer Father    • Stroke Father    • Cancer Father         lung   • Alcohol abuse Sister    • Depression Sister    • Lymphoma Sister    • Alcohol abuse Family    • Alcohol abuse Sister    • Alcohol abuse Sister    • Cancer Sister    • Heart disease Neg Hx    • Thyroid disease Neg Hx          ==  DO Augusto Vaca [facility] Internal Medicine Residency, [address] [place] [address] Dr JULIAN [address], [address] [place] [address] Road  Office: [phone]  Fax: [phone] ====================================  PLEASE NOTE:  This encounter was completed utilizing the [employer] One Voice Recognition Software. Grammatical errors, random word insertions, pronoun errors and incomplete sentences are occasional consequences of the system due to software limitations, ambient noise and hardware issues. These may be missed by proof reading prior to affixing electronic signature. Any questions or concerns about the content, text or information contained within the body of this dictation should be directly addressed to the physician for clarification. Please do not hesitate to call me directly if you have any any questions or concerns.

## 2023-07-17 ENCOUNTER — SOCIAL WORK (OUTPATIENT)
Dept: BEHAVIORAL/MENTAL HEALTH CLINIC | Facility: CLINIC | Age: 58
End: 2023-07-17
Payer: MEDICARE

## 2023-07-17 ENCOUNTER — TELEPHONE (OUTPATIENT)
Dept: BEHAVIORAL/MENTAL HEALTH CLINIC | Facility: CLINIC | Age: 58
End: 2023-07-17

## 2023-07-17 DIAGNOSIS — G47.00 INSOMNIA, UNSPECIFIED TYPE: ICD-10-CM

## 2023-07-17 DIAGNOSIS — F41.0 PANIC ATTACKS: ICD-10-CM

## 2023-07-17 DIAGNOSIS — F31.76 BIPOLAR I DISORDER, MOST RECENT EPISODE DEPRESSED, IN FULL REMISSION (HCC): Primary | ICD-10-CM

## 2023-07-17 DIAGNOSIS — F41.1 GENERALIZED ANXIETY DISORDER: ICD-10-CM

## 2023-07-17 PROCEDURE — 90834 PSYTX W PT 45 MINUTES: CPT | Performed by: SOCIAL WORKER

## 2023-07-17 NOTE — PSYCH
Behavioral Health Psychotherapy Progress Note    Psychotherapy Provided: Individual Psychotherapy     1. Bipolar I disorder, most recent episode depressed, in full remission ([address])        2. Generalized anxiety disorder        3. Panic attacks        4. Insomnia, unspecified type            Goals addressed in session: Goal 1     DATA: Jefferson Shankar arrived for his session. He admits that his depression and anxiety are currently under control. However he has serious body movements. I suggested he may want to consult with a neurologist. We discussed how things are going at his house and he feels most things are well. He continues to be bothered by a problematic, alcoholic father in law. We worked on coping strategies. During this session, this clinician used the following therapeutic modalities: Client-centered Therapy, Cognitive Behavioral Therapy, Mindfulness-based Strategies and Supportive Psychotherapy    Substance Abuse was not addressed during this session. If the client is diagnosed with a co-occurring substance use disorder, please indicate any changes in the frequency or amount of use: n/a. Stage of change for addressing substance use diagnoses: No substance use/Not applicable    ASSESSMENT:  Monalisa Castillo presents with a Euthymic/ normal mood. his affect is Normal range and intensity, which is congruent, with his mood and the content of the session. The client has made progress on their goals. Monalisa Castillo presents with a none risk of suicide, none risk of self-harm, and none risk of harm to others. For any risk assessment that surpasses a "low" rating, a safety plan must be developed. A safety plan was indicated: no  If yes, describe in detail n/a    PLAN: Between sessions, Monalisa Castillo will use mindfulness and CBT.  At the next session, the therapist will use Client-centered Therapy, Cognitive Behavioral Therapy, Mindfulness-based Strategies and Supportive Psychotherapy to address issues and symptoms as they may arise. .    Behavioral Health Treatment Plan and Discharge Planning: Alberto Farazlaurie is aware of and agrees to continue to work on their treatment plan. They have identified and are working toward their discharge goals.  yes    Visit start and stop times:    07/17/23  Start Time: 1310  Stop Time: 1400  Total Visit Time: 50 minutes

## 2023-07-25 ENCOUNTER — OFFICE VISIT (OUTPATIENT)
Dept: PODIATRY | Facility: CLINIC | Age: 58
End: 2023-07-25
Payer: MEDICARE

## 2023-07-25 VITALS
DIASTOLIC BLOOD PRESSURE: 84 MMHG | HEIGHT: 66 IN | RESPIRATION RATE: 18 BRPM | WEIGHT: 268 LBS | BODY MASS INDEX: 43.07 KG/M2 | HEART RATE: 84 BPM | SYSTOLIC BLOOD PRESSURE: 131 MMHG

## 2023-07-25 DIAGNOSIS — E11.42 DIABETIC POLYNEUROPATHY ASSOCIATED WITH TYPE 2 DIABETES MELLITUS (HCC): Primary | ICD-10-CM

## 2023-07-25 PROCEDURE — G0127 TRIM NAIL(S): HCPCS | Performed by: PODIATRIST

## 2023-07-25 NOTE — PROGRESS NOTES
Established patient with class findings presents for nail care. Vascular exam:  DP  2/4 bilateral; PT  0 4 bilateral   Dermatological exam:  Each toenail is thick and  Dystrophic. Neurologic: Burning and tingling both feet  Diagnosis:  Diabetes mellitus with neuropathy  Treatment: Trimmed multiple dystrophic toenails.     Nail removal    Date/Time: 7/25/2023 5:15 PM    Performed by: Dylan Villafana DPM  Authorized by: Dylan Villafana DPM    Nails trimmed:     Number of nails trimmed:  10 N/A

## 2023-07-31 ENCOUNTER — HOSPITAL ENCOUNTER (EMERGENCY)
Facility: HOSPITAL | Age: 58
Discharge: HOME | End: 2023-07-31
Attending: EMERGENCY MEDICINE
Payer: MEDICARE

## 2023-07-31 VITALS
BODY MASS INDEX: 43.13 KG/M2 | OXYGEN SATURATION: 97 % | TEMPERATURE: 97.8 F | DIASTOLIC BLOOD PRESSURE: 102 MMHG | SYSTOLIC BLOOD PRESSURE: 201 MMHG | WEIGHT: 267.2 LBS | HEART RATE: 74 BPM | RESPIRATION RATE: 18 BRPM

## 2023-07-31 DIAGNOSIS — R44.3 HALLUCINATIONS: Primary | ICD-10-CM

## 2023-07-31 DIAGNOSIS — R03.0 ELEVATED BLOOD PRESSURE READING: ICD-10-CM

## 2023-07-31 LAB
ALBUMIN SERPL BCP-MCNC: 3.5 G/DL (ref 3.5–5)
ALP SERPL-CCNC: 100 U/L (ref 34–104)
ALT SERPL W P-5'-P-CCNC: 6 U/L (ref 7–52)
ANION GAP SERPL CALCULATED.3IONS-SCNC: 5 MMOL/L
AST SERPL W P-5'-P-CCNC: 12 U/L (ref 13–39)
ATRIAL RATE: 87 BPM
BASOPHILS # BLD AUTO: 0.04 THOUSANDS/ÂΜL (ref 0–0.1)
BASOPHILS NFR BLD AUTO: 1 % (ref 0–1)
BILIRUB SERPL-MCNC: 0.4 MG/DL (ref 0.2–1)
BUN SERPL-MCNC: 7 MG/DL (ref 5–25)
CALCIUM SERPL-MCNC: 8.8 MG/DL (ref 8.4–10.2)
CHLORIDE SERPL-SCNC: 103 MMOL/L (ref 96–108)
CO2 SERPL-SCNC: 29 MMOL/L (ref 21–32)
CREAT SERPL-MCNC: 1.43 MG/DL (ref 0.6–1.3)
EOSINOPHIL # BLD AUTO: 0.27 THOUSAND/ÂΜL (ref 0–0.61)
EOSINOPHIL NFR BLD AUTO: 4 % (ref 0–6)
ERYTHROCYTE [DISTWIDTH] IN BLOOD BY AUTOMATED COUNT: 14.6 % (ref 11.6–15.1)
GFR SERPL CREATININE-BSD FRML MDRD: 53 ML/MIN/1.73SQ M
GLUCOSE SERPL-MCNC: 80 MG/DL (ref 65–140)
HCT VFR BLD AUTO: 40.8 % (ref 36.5–49.3)
HGB BLD-MCNC: 13.3 G/DL (ref 12–17)
IMM GRANULOCYTES # BLD AUTO: 0 THOUSAND/UL (ref 0–0.2)
IMM GRANULOCYTES NFR BLD AUTO: 0 % (ref 0–2)
LYMPHOCYTES # BLD AUTO: 1.98 THOUSANDS/ÂΜL (ref 0.6–4.47)
LYMPHOCYTES NFR BLD AUTO: 33 % (ref 14–44)
MCH RBC QN AUTO: 25.5 PG (ref 26.8–34.3)
MCHC RBC AUTO-ENTMCNC: 32.6 G/DL (ref 31.4–37.4)
MCV RBC AUTO: 78 FL (ref 82–98)
MONOCYTES # BLD AUTO: 0.62 THOUSAND/ÂΜL (ref 0.17–1.22)
MONOCYTES NFR BLD AUTO: 10 % (ref 4–12)
NEUTROPHILS # BLD AUTO: 3.17 THOUSANDS/ÂΜL (ref 1.85–7.62)
NEUTS SEG NFR BLD AUTO: 52 % (ref 43–75)
NRBC BLD AUTO-RTO: 0 /100 WBCS
P AXIS: 47 DEGREES
PLATELET # BLD AUTO: 192 THOUSANDS/UL (ref 149–390)
PMV BLD AUTO: 9.5 FL (ref 8.9–12.7)
POTASSIUM SERPL-SCNC: 3.7 MMOL/L (ref 3.5–5.3)
PR INTERVAL: 186 MS
PROT SERPL-MCNC: 6.7 G/DL (ref 6.4–8.4)
QRS AXIS: 18 DEGREES
QRSD INTERVAL: 82 MS
QT INTERVAL: 362 MS
QTC INTERVAL: 435 MS
RBC # BLD AUTO: 5.21 MILLION/UL (ref 3.88–5.62)
SODIUM SERPL-SCNC: 137 MMOL/L (ref 135–147)
T WAVE AXIS: 41 DEGREES
TSH SERPL DL<=0.05 MIU/L-ACNC: 1.92 UIU/ML (ref 0.45–4.5)
VENTRICULAR RATE: 87 BPM
WBC # BLD AUTO: 6.08 THOUSAND/UL (ref 4.31–10.16)

## 2023-07-31 PROCEDURE — 80053 COMPREHEN METABOLIC PANEL: CPT | Performed by: EMERGENCY MEDICINE

## 2023-07-31 PROCEDURE — 36415 COLL VENOUS BLD VENIPUNCTURE: CPT | Performed by: EMERGENCY MEDICINE

## 2023-07-31 PROCEDURE — 93005 ELECTROCARDIOGRAM TRACING: CPT

## 2023-07-31 PROCEDURE — 99283 EMERGENCY DEPT VISIT LOW MDM: CPT

## 2023-07-31 PROCEDURE — 99284 EMERGENCY DEPT VISIT MOD MDM: CPT | Performed by: EMERGENCY MEDICINE

## 2023-07-31 PROCEDURE — 85025 COMPLETE CBC W/AUTO DIFF WBC: CPT | Performed by: EMERGENCY MEDICINE

## 2023-07-31 PROCEDURE — 84443 ASSAY THYROID STIM HORMONE: CPT | Performed by: EMERGENCY MEDICINE

## 2023-08-01 NOTE — ED PROCEDURE NOTE
PROCEDURE  ECG 12 Lead Documentation Only    Date/Time: 7/31/2023 9:31 PM    Performed by: Juliet Devi MD  Authorized by: Juliet Devi MD    Indications / Diagnosis:  Forgetfulness   ECG reviewed by me, the ED Provider: yes    Patient location:  ED and bedside  Previous ECG:     Previous ECG:  Unavailable    Comparison to cardiac monitor: Yes    Interpretation:     Interpretation: non-specific    Rate:     ECG rate:  87    ECG rate assessment: normal    Rhythm:     Rhythm: sinus rhythm    Ectopy:     Ectopy: none    QRS:     QRS axis:  Normal    QRS intervals:  Normal  Conduction:     Conduction: normal    ST segments:     ST segments:  Normal  T waves:     T waves: flattening      Flattening:  AVL, III and V1  Q waves:     Q waves:  V1  Comments:      No ecg signs of ischemia/ injury / r heart strain/ jerilyn/pericarditis          Juliet Devi MD  07/31/23 5061

## 2023-08-01 NOTE — DISCHARGE INSTRUCTIONS
Diagnosis: hallucinations/  elevated blood pressure in the er 200/100    - please take all of your medications as before    - please call your mental health provider tomorrow about  the hallucinations     - your blood pressure was elevated in the er- the er is not the best place to check your blood pressure- - if you check it at home when you are calm and relaxed- it should be persistently  under 140/90    - please call your primary doctor tomorrow to schedule an appointment for a recheck within 1 week - for a blood pressure recheck    - please return to  the er for any worsening hallucinations or any new/ worsening/concerning symptoms to you

## 2023-08-02 ENCOUNTER — TELEPHONE (OUTPATIENT)
Dept: PSYCHIATRY | Facility: CLINIC | Age: 58
End: 2023-08-02

## 2023-08-02 ENCOUNTER — OFFICE VISIT (OUTPATIENT)
Dept: INTERNAL MEDICINE CLINIC | Facility: CLINIC | Age: 58
End: 2023-08-02

## 2023-08-02 VITALS
DIASTOLIC BLOOD PRESSURE: 87 MMHG | WEIGHT: 264 LBS | SYSTOLIC BLOOD PRESSURE: 126 MMHG | BODY MASS INDEX: 42.61 KG/M2 | HEART RATE: 101 BPM | TEMPERATURE: 98.2 F

## 2023-08-02 DIAGNOSIS — Z79.899 OTHER LONG TERM (CURRENT) DRUG THERAPY: ICD-10-CM

## 2023-08-02 DIAGNOSIS — R79.89 OTHER SPECIFIED ABNORMAL FINDINGS OF BLOOD CHEMISTRY: ICD-10-CM

## 2023-08-02 DIAGNOSIS — R29.818 EXTRAPYRAMIDAL SYMPTOM: ICD-10-CM

## 2023-08-02 DIAGNOSIS — R42 LIGHTHEADED: Primary | ICD-10-CM

## 2023-08-02 DIAGNOSIS — R93.89 ABNORMAL FINDINGS ON DIAGNOSTIC IMAGING OF OTHER SPECIFIED BODY STRUCTURES: ICD-10-CM

## 2023-08-02 DIAGNOSIS — R63.4 UNINTENDED WEIGHT LOSS: ICD-10-CM

## 2023-08-02 DIAGNOSIS — R63.0 LOSS OF APPETITE: ICD-10-CM

## 2023-08-02 DIAGNOSIS — F31.62 BIPOLAR DISORDER, CURRENT EPISODE MIXED, MODERATE (HCC): ICD-10-CM

## 2023-08-02 PROCEDURE — 99214 OFFICE O/P EST MOD 30 MIN: CPT | Performed by: PHYSICIAN ASSISTANT

## 2023-08-02 NOTE — PROGRESS NOTES
Name: Lizbeth Wells      : 1965      MRN: [id number]  Encounter Provider: Allyson Lane PA-C  Encounter Date: 2023   Encounter department: [address]     1. Lightheaded  Assessment & Plan:  Discussed possible secondary Parkinson's due to current psychiatric regimen. He will call his psychiatrist to discuss potential side effects of medications. Will order labs to evaluate further. Will also consider brain MRI and carotid duplex. Encouraged adequate hydration. Avoid irritants such as alcohol and caffeine. ER precautions given. Stable at discharge. Orders:  -     CBC and differential; Future  -     Iron Panel (Includes Ferritin, Iron Sat%, Iron, and TIBC); Future  -     Vitamin B12; Future  -     TSH, 3rd generation; Future  -     T4, free; Future  -     RPR-Syphilis Screening (Total Syphilis IGG/IGM); Future    2. Loss of appetite  Assessment & Plan:  Loss of appetite and lack of oral intake likely cause of unintentional weight loss. He did have a colonoscopy 5 years ago that was unremarkable. I did recommend a follow up with GI to ensure no contributing factors. He reports his mother had to have her stomach removed, but does not recall why. Orders:  -     Ambulatory Referral to Gastroenterology; Future    3. Unintended weight loss  Assessment & Plan:  Lost approximately 25 lbs in 6 months. See plan for loss of appetite. Orders:  -     Ambulatory Referral to Gastroenterology; Future    4. Bipolar disorder, current episode mixed, moderate ([address])  Assessment & Plan:  Patient could not verify medication list. Current regimen appears to be: BuSpar 30 mg BID, Latuda 120 mg QD, Wellbutrin 100 mg BID, Klonopin 1 mg prn, gabapentin 300 mg BID, trazodone 50 mg qHS, and Cogentin 2 mg BID. He will schedule a follow up with his psychiatrist.      5. Extrapyramidal symptom    6.  Other specified abnormal findings of blood chemistry  -     Iron Panel (Includes Ferritin, Iron Sat%, Iron, and TIBC); Future    7. Other long term (current) drug therapy  -     Vitamin B12; Future    8. Abnormal findings on diagnostic imaging of other specified body structures  -     TSH, 3rd generation; Future  -     T4, free; Future         Subjective      Patient is a 63year old male presenting for an ER follow up. He went to the ER on 7/31 as he was seeing 3 women from the 1800s. Patient had period of confusion as well where he forgot where he was. Patient denies any auditory hallucinations. He has not had any visual hallucinations since then. Patient denies SI. I do not think he has been adherent to his medications. When reviewing his medications, he could not verify what he is taking. In the ER he told them he was taken off of various medications, here today he states he is taking everything, but can not verify the list.   He does note his short term memory is not as good as it used to be. He can not give any examples. He also states he has the constant urge to rock and fidget with his hands. He not sure why, or when it started. His girlfriend mentions that she thinks his gait has changed as well. She states it looks like he shuffles. He occasionally feels lightheaded, like he is off balance. Denies room spinning sensation. Denies headaches and visual changes. Only when he walks. No injury or trauma. No other associated symptoms. He also notes loss of appetite for awhile now. He eats once or twice a day. He is not sure why. States he is not hungry or the food doesn't taste right. Denies abdominal pain, nausea, and vomiting. Denies diarrhea or constipation. Denies hematochezia and melena. Denies tobacco or alcohol use. Denies illicit drug use. He sleeps well at night, at least 8 hours. He does drink a pitcher or more of ice tea daily. No other caffeine intake.      Review of Systems   Constitutional: Positive for appetite change (decreased for about 6 months) and unexpected weight change (not eating, losing weight). Negative for chills, diaphoresis, fatigue and fever. HENT: Negative for congestion, hearing loss, sore throat, tinnitus and trouble swallowing. Eyes: Negative for visual disturbance. Respiratory: Negative for cough, chest tightness, shortness of breath and wheezing. Cardiovascular: Negative for chest pain, palpitations and leg swelling. Gastrointestinal: Negative for abdominal pain, blood in stool, constipation, diarrhea, nausea and vomiting. Endocrine: Negative for cold intolerance, heat intolerance, polydipsia, polyphagia and polyuria. Musculoskeletal: Negative for arthralgias and myalgias. Skin: Negative for rash. Neurological: Positive for light-headedness (feels lightheaded and off balance sometimes). Negative for dizziness, tremors, weakness, numbness and headaches. Hematological: Negative for adenopathy. Psychiatric/Behavioral: Positive for confusion and hallucinations. Negative for agitation, behavioral problems, decreased concentration, dysphoric mood, self-injury, sleep disturbance and suicidal ideas. The patient is not nervous/anxious and is not hyperactive.         Current Outpatient Medications on File Prior to Visit   Medication Sig   • ammonium lactate (LAC-HYDRIN) 12 % cream Apply topically as needed for dry skin (Patient not taking: Reported on 3/1/2023)   • ammonium lactate (LAC-HYDRIN) 12 % cream Apply topically as needed for dry skin (Patient not taking: Reported on 2/24/2023)   • ascorbic acid (VITAMIN C) 250 mg tablet Take 1 tablet (250 mg total) by mouth daily (Patient not taking: Reported on 11/16/2021)   • atorvastatin (LIPITOR) 20 mg tablet Take 1 tablet (20 mg total) by mouth daily   • benztropine (COGENTIN) 2 mg tablet TAKE 1 TABLET BY MOUTH TWICE DAILY   • Blood Glucose Monitoring Suppl (FREESTYLE LITE) ANTONIA by Does not apply route 3 (three) times a day (Patient not taking: Reported on 11/21/2022)   • Blood Glucose Monitoring Suppl (FreeStyle Lite) w/Device KIT USE AS INSTRUCTED 4 TIMES A DAY (Patient not taking: Reported on 2/24/2023)   • buPROPion (WELLBUTRIN) 100 mg tablet Take 1 tablet (100 mg total) by mouth 2 (two) times a day Take bid--(once in the AM and once at 12 noon)   • busPIRone (BUSPAR) 30 MG tablet Take 1 tablet (30 mg total) by mouth 2 (two) times a day (Patient taking differently: Take 15 mg by mouth 2 (two) times a day)   • cholecalciferol (VITAMIN D3) 1,000 units tablet Take 1 tablet (1,000 Units total) by mouth in the morning.  (Patient not taking: Reported on 5/23/2022)   • clonazePAM (KlonoPIN) 1 mg tablet TAKE 1 TABLET BY MOUTH EVERY DAY AS NEEDED FOR ANXIETY OR PANIC ATTACKS   • Continuous Blood Gluc  (FreeStyle [place] 14 Day [place]) ANTONIA Use  to check BG at least 4 times a day (Patient not taking: Reported on 2/21/2022)   • Continuous Blood Gluc Sensor (FreeStyle [place] 14 Day Sensor) MISC Apply sensor every 14 days to check BG at least 4 times a day (Patient not taking: Reported on 2/21/2022)   • ferrous sulfate (EQL Iron Supplement Therapy) 325 (65 Fe) mg tablet Take 1 tablet (325 mg total) by mouth daily with breakfast (Patient not taking: Reported on 11/16/2021)   • fluticasone (FLONASE) 50 mcg/act nasal spray 1 spray into each nostril daily (Patient not taking: Reported on 2/21/2022)   • FREESTYLE LITE test strip CHECK BLOOD SUGARS FOUR TIMES DAILY (Patient not taking: Reported on 11/21/2022)   • gabapentin (NEURONTIN) 300 mg capsule TAKE 1 CAPSULE(300 MG) BY MOUTH TWICE DAILY (Patient not taking: Reported on 7/31/2023)   • glucose monitoring kit (FREESTYLE) monitoring kit Use 1 each 4 (four) times a day Fine to substitute other brand if not covered by insurance (Patient not taking: Reported on 2/15/2023)   • Insulin Pen Needle 31G X 8 MM MISC Check sugars three times a day   • INSULIN SYRINGE .5CC/29G 29G X 1/2" 0.5 ML MISC by Does not apply route   • Lancets (FREESTYLE) lancets USE THREE TIMES DAILY AS DIRECTED (Patient not taking: Reported on 11/21/2022)   • lisinopril-hydrochlorothiazide (PRINZIDE,ZESTORETIC) 20-12.5 MG per tablet Take 1 tablet by mouth daily Do not start before June 1, 2023. (Patient not taking: Reported on 7/5/2023)   • lurasidone (Latuda) 120 mg tablet Take 1 tablet (120 mg total) by mouth daily with dinner   • omeprazole (PriLOSEC) 20 mg delayed release capsule Take 1 capsule (20 mg total) by mouth daily   • sodium chloride (OCEAN) 0.65 % nasal spray 1 spray into each nostril daily at bedtime (Patient not taking: Reported on 6/14/2022)   • traZODone (DESYREL) 50 mg tablet Take 1/2 to 1 tab po qhs prn insomnia   • Trulicity 1.5 [place]/9.8WO injection INJECT ONCE WEEKLY   • [DISCONTINUED] dextromethorphan-guaiFENesin (ROBITUSSIN DM)  mg/5 mL syrup Take 5 mL by mouth 4 (four) times a day as needed for cough or congestion (Patient not taking: Reported on 5/23/2022)       Objective     /87 (BP Location: Right arm, Patient Position: Standing, Cuff Size: Large)   Pulse 101   Temp 98.2 °F (36.8 °C) (Temporal)   Wt 120 kg (264 lb)   BMI 42.61 kg/m²     Physical Exam  Vitals and nursing note reviewed. Constitutional:       General: He is awake. He is not in acute distress. Appearance: Normal appearance. He is well-developed and well-groomed. He is obese. He is not ill-appearing. HENT:      Head: Normocephalic and atraumatic. Right Ear: Hearing, tympanic membrane, ear canal and external ear normal.      Left Ear: Hearing, tympanic membrane, ear canal and external ear normal.      Nose: Nose normal.      Mouth/Throat:      Lips: Pink. Mouth: Mucous membranes are moist.      Pharynx: Oropharynx is clear. Uvula midline. No oropharyngeal exudate or posterior oropharyngeal erythema. Eyes:      General: No scleral icterus. Conjunctiva/sclera: Conjunctivae normal.   Neck:      Vascular: No carotid bruit.    Cardiovascular:      Rate and Rhythm: Normal rate and regular rhythm. Pulses: Normal pulses. Heart sounds: Normal heart sounds. No murmur heard. Pulmonary:      Effort: Pulmonary effort is normal. No respiratory distress. Breath sounds: Normal breath sounds and air entry. No decreased air movement. No decreased breath sounds, wheezing, rhonchi or rales. Abdominal:      General: Abdomen is flat. Bowel sounds are normal. There is no distension. Palpations: Abdomen is soft. There is no mass. Tenderness: There is no abdominal tenderness. There is no right CVA tenderness, left CVA tenderness, guarding or rebound. Hernia: No hernia is present. Musculoskeletal:         General: Normal range of motion. Cervical back: Neck supple. Right lower leg: No edema. Left lower leg: No edema. Lymphadenopathy:      Cervical: No cervical adenopathy. Skin:     General: Skin is warm. Coloration: Skin is not jaundiced. Findings: No rash. Neurological:      General: No focal deficit present. Mental Status: He is alert and oriented to person, place, and time. Mental status is at baseline. Motor: Motor function is intact. Coordination: Coordination is intact. Gait: Gait abnormal (slow shuffling gait). Psychiatric:         Attention and Perception: Attention normal.         Mood and Affect: Mood and affect normal.         Speech: Speech normal.         Behavior: Behavior normal. Behavior is cooperative.          Cognition and Memory: Cognition normal.       Anthony Calderón PA-C

## 2023-08-02 NOTE — TELEPHONE ENCOUNTER
Patient contacted the office to schedule a follow up visit with provider. Patient is now scheduled for 8/3/2023  at 1pm in office.

## 2023-08-02 NOTE — PATIENT INSTRUCTIONS
Please schedule a follow up with your psychiatrist to discuss your symptoms as these could be side effects of your medications.

## 2023-08-02 NOTE — ED PROVIDER NOTES
History  Chief Complaint   Patient presents with   • Psychiatric Evaluation     Patient reports he is hallucinating, seeing 3 women from the 56s. Patient had period of confusion yesterday where he forgot where he was. Patient denies any auditory hallucinations. Patient denies SI. Has passive HI of killing father in law. 63 yr male with hx of bipolar  d/o - no meds/ complaint - as per girlfriend recently with episodes of confusion and visual hallucinations -- pt currently deneis any si/plant/ intent- feels that he does not need to talk to cis - hx of htn but recently talking off meds-- no fevers/ head injury -- no other comps      History provided by:  Patient and friend   used: No    Psychiatric Evaluation  Presenting symptoms: hallucinations    Presenting symptoms: no agitation, no self-mutilation and no suicidal thoughts    Associated symptoms: no anxiety        Prior to Admission Medications   Prescriptions Last Dose Informant Patient Reported? Taking?    Blood Glucose Monitoring Suppl (FREESTYLE LITE) ANTONIA Not Taking Self No No   Sig: by Does not apply route 3 (three) times a day   Patient not taking: Reported on 11/21/2022   Blood Glucose Monitoring Suppl (FreeStyle Lite) w/Device KIT Not Taking Self No No   Sig: USE AS INSTRUCTED 4 TIMES A DAY   Patient not taking: Reported on 2/24/2023   Continuous Blood Gluc  (FreeStyle [place] 14 Day [place]) ANTONIA Not Taking Self No No   Sig: Use  to check BG at least 4 times a day   Patient not taking: Reported on 2/21/2022   Continuous Blood Gluc Sensor (FreeStyle [place] 14 Day Sensor) MISC Not Taking Self No No   Sig: Apply sensor every 14 days to check BG at least 4 times a day   Patient not taking: Reported on 2/21/2022   FREESTYLE LITE test strip Not Taking Self No No   Sig: CHECK BLOOD SUGARS FOUR TIMES DAILY   Patient not taking: Reported on 11/21/2022   INSULIN SYRINGE .5CC/29G 29G X 1/2" 0.5 ML MISC 7/31/2023 Self Yes Yes Sig: by Does not apply route   Insulin Pen Needle 31G X 8 MM MISC 7/31/2023 Self No Yes   Sig: Check sugars three times a day   Lancets (FREESTYLE) lancets Not Taking Self No No   Sig: USE THREE TIMES DAILY AS DIRECTED   Patient not taking: Reported on 28/43/4639   Trulicity 1.5 MV/8.0GH injection 7/31/2023 Self No Yes   Sig: INJECT ONCE WEEKLY   ammonium lactate (LAC-HYDRIN) 12 % cream Not Taking Self No No   Sig: Apply topically as needed for dry skin   Patient not taking: Reported on 3/1/2023   ammonium lactate (LAC-HYDRIN) 12 % cream Not Taking Self No No   Sig: Apply topically as needed for dry skin   Patient not taking: Reported on 2/24/2023   ascorbic acid (VITAMIN C) 250 mg tablet Not Taking Self No No   Sig: Take 1 tablet (250 mg total) by mouth daily   Patient not taking: Reported on 11/16/2021   atorvastatin (LIPITOR) 20 mg tablet 7/31/2023 Self No Yes   Sig: Take 1 tablet (20 mg total) by mouth daily   benztropine (COGENTIN) 2 mg tablet 7/31/2023 Self No Yes   Sig: TAKE 1 TABLET BY MOUTH TWICE DAILY   buPROPion (WELLBUTRIN) 100 mg tablet 7/31/2023 Self No Yes   Sig: Take 1 tablet (100 mg total) by mouth 2 (two) times a day Take bid--(once in the AM and once at 12 noon)   busPIRone (BUSPAR) 30 MG tablet 7/31/2023 Self No Yes   Sig: Take 1 tablet (30 mg total) by mouth 2 (two) times a day   Patient taking differently: Take 15 mg by mouth 2 (two) times a day   cholecalciferol (VITAMIN D3) 1,000 units tablet Not Taking Self No No   Sig: Take 1 tablet (1,000 Units total) by mouth in the morning.    Patient not taking: Reported on 5/23/2022   clonazePAM (KlonoPIN) 1 mg tablet 7/31/2023 Self No Yes   Sig: TAKE 1 TABLET BY MOUTH EVERY DAY AS NEEDED FOR ANXIETY OR PANIC ATTACKS   dextromethorphan-guaiFENesin (ROBITUSSIN DM)  mg/5 mL syrup Not Taking Self No No   Sig: Take 5 mL by mouth 4 (four) times a day as needed for cough or congestion   Patient not taking: Reported on 5/23/2022   ferrous sulfate (EQL Iron Supplement Therapy) 325 (65 Fe) mg tablet Not Taking Self No No   Sig: Take 1 tablet (325 mg total) by mouth daily with breakfast   Patient not taking: Reported on 2021   fluticasone (FLONASE) 50 mcg/act nasal spray Not Taking Self No No   Si spray into each nostril daily   Patient not taking: Reported on 2022   gabapentin (NEURONTIN) 300 mg capsule Not Taking Self No No   Sig: TAKE 1 CAPSULE(300 MG) BY MOUTH TWICE DAILY   Patient not taking: Reported on 2023   glucose monitoring kit (FREESTYLE) monitoring kit  Self No No   Sig: Use 1 each 4 (four) times a day Fine to substitute other brand if not covered by insurance   Patient not taking: Reported on 2/15/2023   lisinopril-hydrochlorothiazide (PRINZIDE,ZESTORETIC) 20-12.5 MG per tablet Not Taking Self No No   Sig: Take 1 tablet by mouth daily Do not start before 2023.    Patient not taking: Reported on 2023   lurasidone (Latuda) 120 mg tablet 2023 Self No Yes   Sig: Take 1 tablet (120 mg total) by mouth daily with dinner   omeprazole (PriLOSEC) 20 mg delayed release capsule 2023 Self No Yes   Sig: Take 1 capsule (20 mg total) by mouth daily   sodium chloride (OCEAN) 0.65 % nasal spray  Self No No   Si spray into each nostril daily at bedtime   Patient not taking: Reported on 2022   traZODone (DESYREL) 50 mg tablet 2023 Self No Yes   Sig: Take 1/2 to 1 tab po qhs prn insomnia      Facility-Administered Medications: None       Past Medical History:   Diagnosis Date   • ADHD, adult residual type    • Anxiety    • Anxiety    • Bipolar 1 disorder (HCC)    • Cataract     Left eye   • Depression    • Depression    • Diabetes mellitus (HCC)     blood sugar 167 on admission   • Equinus deformity of foot    • GERD (gastroesophageal reflux disease)    • Homicidal ideations    • Hyperlipidemia    • Hypertension    • Microalbuminuria    • Morbid obesity ([address])    • Neuropathy    • Shortness of breath    • Sleep apnea CPAP at bedtime   • Sleep apnea    • Stroke [facility])    • Suicidal intent        Past Surgical History:   Procedure Laterality Date   • CATARACT EXTRACTION     • CATARACT EXTRACTION     • HAND SURGERY Right    • OTHER SURGICAL HISTORY      REPAIR OF SUPERFICIAL WOUND FACE   • AR ESOPHAGOGASTRODUODENOSCOPY TRANSORAL DIAGNOSTIC N/A 2018    Procedure: ESOPHAGOGASTRODUODENOSCOPY (EGD); Surgeon: Daniel Etienne MD;  Location: [facility]; Service: Gastroenterology   • AR ESOPHAGOGASTRODUODENOSCOPY TRANSORAL DIAGNOSTIC N/A 2018    Procedure: EGD AND COLONOSCOPY;  Surgeon: Daniel Etienne MD;  Location: [facility]; Service: Gastroenterology       Family History   Problem Relation Age of Onset   • Diabetes Mother    • Alcohol abuse Father    • Diabetes Father    • Hypertension Father    • Lung cancer Father    • Stroke Father    • Cancer Father         lung   • Alcohol abuse Sister    • Depression Sister    • Lymphoma Sister    • Alcohol abuse Family    • Alcohol abuse Sister    • Alcohol abuse Sister    • Cancer Sister    • Heart disease Neg Hx    • Thyroid disease Neg Hx      I have reviewed and agree with the history as documented. E-Cigarette/Vaping   • E-Cigarette Use Never User      E-Cigarette/Vaping Substances   • Nicotine No    • THC No    • CBD No    • Flavoring No    • Other No    • Unknown No      Social History     Tobacco Use   • Smoking status: Former     Types: Cigarettes     Quit date:      Years since quittin.6   • Smokeless tobacco: Former     Types: Chew   • Tobacco comments:     pt "Quit after approx over 25 years ago"   Vaping Use   • Vaping Use: Never used   Substance Use Topics   • Alcohol use: Yes     Comment: rarely   • Drug use: Not Currently     Comment: quit 20 years ago       Review of Systems   Constitutional: Negative. HENT: Negative. Eyes: Negative. Respiratory: Negative. Cardiovascular: Negative. Gastrointestinal: Negative. Endocrine: Negative. Genitourinary: Negative. Musculoskeletal: Negative. Skin: Negative. Allergic/Immunologic: Negative. Neurological: Negative. Hematological: Negative. Psychiatric/Behavioral: Positive for confusion and hallucinations. Negative for agitation, behavioral problems, decreased concentration, dysphoric mood, self-injury, sleep disturbance and suicidal ideas. The patient is not nervous/anxious and is not hyperactive. Physical Exam  Physical Exam  Vitals and nursing note reviewed. Constitutional:       General: He is not in acute distress. Appearance: Normal appearance. He is not ill-appearing, toxic-appearing or diaphoretic. Comments: avss-  htnsive- pulse ox 97 % on ra- interpretation is normal- no intervention    HENT:      Head: Normocephalic and atraumatic. Right Ear: Tympanic membrane, ear canal and external ear normal. There is no impacted cerumen. Left Ear: Tympanic membrane, ear canal and external ear normal. There is no impacted cerumen. Nose: Nose normal. No congestion or rhinorrhea. Mouth/Throat:      Mouth: Mucous membranes are moist.      Pharynx: Oropharynx is clear. No oropharyngeal exudate or posterior oropharyngeal erythema. Eyes:      General: No scleral icterus. Right eye: No discharge. Left eye: No discharge. Extraocular Movements: Extraocular movements intact. Conjunctiva/sclera: Conjunctivae normal.      Pupils: Pupils are equal, round, and reactive to light. Comments: Mm pink   Neck:      Vascular: No carotid bruit. Comments: No pmt c/t/l/s spine- no meningeal signs   Cardiovascular:      Rate and Rhythm: Normal rate and regular rhythm. Pulses: Normal pulses. Heart sounds: Normal heart sounds. No murmur heard. No friction rub. No gallop. Pulmonary:      Effort: Pulmonary effort is normal. No respiratory distress. Breath sounds: Normal breath sounds. No stridor.  No wheezing, rhonchi or rales.   Chest:      Chest wall: No tenderness. Abdominal:      General: Bowel sounds are normal. There is no distension. Palpations: Abdomen is soft. There is no mass. Tenderness: There is no abdominal tenderness. There is no right CVA tenderness, left CVA tenderness, guarding or rebound. Hernia: No hernia is present. Comments: Soft nt/nd- no hsm - no cva tenderness- no ascites- no peritoneal signs    Musculoskeletal:         General: No swelling, tenderness, deformity or signs of injury. Normal range of motion. Cervical back: Normal range of motion and neck supple. No rigidity or tenderness. Right lower leg: No edema. Left lower leg: No edema. Comments: Equal bilateral radial/dp pulses- no ble edema/calf tenderness/asym/ erythema    Lymphadenopathy:      Cervical: No cervical adenopathy. Skin:     General: Skin is warm. Capillary Refill: Capillary refill takes less than 2 seconds. Coloration: Skin is not jaundiced or pale. Findings: No bruising, erythema, lesion or rash. Neurological:      General: No focal deficit present. Mental Status: He is alert and oriented to person, place, and time. Mental status is at baseline. Cranial Nerves: No cranial nerve deficit. Sensory: No sensory deficit. Motor: No weakness.       Coordination: Coordination normal.      Gait: Gait normal.      Comments: Normal non focal neuro exam - no nystagmus- neg test of sckew- normal finger to nose- heel to shin all bilaterally- no truncal ataxia normal gait in er with er md    Psychiatric:         Behavior: Behavior normal.      Comments: mildy anxious appearing          Vital Signs  ED Triage Vitals   Temperature Pulse Respirations Blood Pressure SpO2   07/31/23 1958 07/31/23 1958 07/31/23 1958 07/31/23 1958 07/31/23 1958   97.8 °F (36.6 °C) 93 16 155/84 98 %      Temp Source Heart Rate Source Patient Position - Orthostatic VS BP Location FiO2 (%)   07/31/23 1958 07/31/23 1958 07/31/23 1958 07/31/23 1958 --   Oral Monitor Sitting Right arm       Pain Score       07/31/23 2111       No Pain           Vitals:    07/31/23 1958 07/31/23 2111 07/31/23 2156   BP: 155/84 (!) 194/102 (!) 201/102   Pulse: 93 81 74   Patient Position - Orthostatic VS: Sitting Lying Sitting         Visual Acuity      ED Medications  Medications - No data to display    Diagnostic Studies  Results Reviewed     Procedure Component Value Units Date/Time    TSH [[id number]]  (Normal) Collected: 07/31/23 2104    Lab Status: Final result Specimen: Blood from Arm, Left Updated: 07/31/23 2146     TSH 3RD GENERATON 1.922 uIU/mL     Comprehensive metabolic panel [[id number]]  (Abnormal) Collected: 07/31/23 2104    Lab Status: Final result Specimen: Blood from Arm, Left Updated: 07/31/23 2128     Sodium 137 mmol/L      Potassium 3.7 mmol/L      Chloride 103 mmol/L      CO2 29 mmol/L      ANION GAP 5 mmol/L      BUN 7 mg/dL      Creatinine 1.43 mg/dL      Glucose 80 mg/dL      Calcium 8.8 mg/dL      AST 12 U/L      ALT 6 U/L      Alkaline Phosphatase 100 U/L      Total Protein 6.7 g/dL      Albumin 3.5 g/dL      Total Bilirubin 0.40 mg/dL      eGFR 53 ml/min/1.73sq m     Narrative:      [facility] guidelines for Chronic Kidney Disease (CKD):   •  Stage 1 with normal or high GFR (GFR > 90 mL/min/1.73 square meters)  •  Stage 2 Mild CKD (GFR = 60-89 mL/min/1.73 square meters)  •  Stage 3A Moderate CKD (GFR = 45-59 mL/min/1.73 square meters)  •  Stage 3B Moderate CKD (GFR = 30-44 mL/min/1.73 square meters)  •  Stage 4 Severe CKD (GFR = 15-29 mL/min/1.73 square meters)  •  Stage 5 End Stage CKD (GFR <15 mL/min/1.73 square meters)  Note: GFR calculation is accurate only with a steady state creatinine    CBC and differential [[id number]]  (Abnormal) Collected: 07/31/23 2104    Lab Status: Final result Specimen: Blood from Arm, Left Updated: 07/31/23 2112     WBC 6.08 Thousand/uL      RBC 5.21 Million/uL      Hemoglobin 13.3 g/dL      Hematocrit 40.8 %      MCV 78 fL      MCH 25.5 pg      MCHC 32.6 g/dL      RDW 14.6 %      MPV 9.5 fL      Platelets 884 Thousands/uL      nRBC 0 /100 WBCs      Neutrophils Relative 52 %      Immat GRANS % 0 %      Lymphocytes Relative 33 %      Monocytes Relative 10 %      Eosinophils Relative 4 %      Basophils Relative 1 %      Neutrophils Absolute 3.17 Thousands/µL      Immature Grans Absolute 0.00 Thousand/uL      Lymphocytes Absolute 1.98 Thousands/µL      Monocytes Absolute 0.62 Thousand/µL      Eosinophils Absolute 0.27 Thousand/µL      Basophils Absolute 0.04 Thousands/µL                  No orders to display              Procedures  Procedures         ED Course  ED Course as of 08/02/23 1101   Mon Jul 31, 2023   2217 Er md note- current labs reviewed and compared to previous by er md                               SBIRT 22yo+    Flowsheet Row Most Recent Value   Initial Alcohol Screen: US AUDIT-C     1. How often do you have a drink containing alcohol? 1 Filed at: 07/31/2023 2036   2. How many drinks containing alcohol do you have on a typical day you are drinking? 0 Filed at: 07/31/2023 2036   3a. Male UNDER 65: How often do you have five or more drinks on one occasion? 0 Filed at: 07/31/2023 2036   3b. FEMALE Any Age, or MALE 65+: How often do you have 4 or more drinks on one occassion? 0 Filed at: 07/31/2023 2036   Audit-C Score 1 Filed at: 07/31/2023 2036   [employer]: How many times in the past year have you. .. Used an illegal drug or used a prescription medication for non-medical reasons?  Never Filed at: 07/31/2023 2036                    Medical Decision Making  Pt with  recently intermitent confusion- and visual halluications -- but currently feels stable psych and has psych followup -compliant with meds- - currently in no psych emergency     Elevated blood pressure reading: acute illness or injury     Details: pt with normal exam / asymptomagi- no signs of any end organ damage- pt recenlty taken off htnsive meds- instucted pt and girlfriend to have rece)hecked within 1 week- they verbalized understanding that er is not best palce to check bp  Hallucinations: acute illness or injury     Details: see above   Amount and/or Complexity of Data Reviewed  Independent Historian: friend  External Data Reviewed: labs and notes. Details: previous testing reviewed   Labs: ordered. Decision-making details documented in ED Course. Details: all reviewed and compared   ECG/medicine tests: ordered and independent interpretation performed. Decision-making details documented in ED Course. Details: all reviewed and comapred   Discussion of management or test interpretation with external provider(s): Moderate amount of er md thought complexity and workup     Risk  Decision regarding hospitalization. Disposition  Final diagnoses:   Hallucinations   Elevated blood pressure reading     Time reflects when diagnosis was documented in both MDM as applicable and the Disposition within this note     Time User Action Codes Description Comment    7/31/2023 10:12 PM Cheikh Abdi [R44.3] Hallucinations     7/31/2023 10:13 PM Cheikh Abdi [R03.0] Elevated blood pressure reading       ED Disposition     ED Disposition   Discharge    Condition   Stable    Date/Time   Mon Jul 31, 2023 2212    [address] [facility], [place] [address] discharge to home/self care.                Follow-up Information    None         Discharge Medication List as of 7/31/2023 10:17 PM      CONTINUE these medications which have NOT CHANGED    Details   atorvastatin (LIPITOR) 20 mg tablet Take 1 tablet (20 mg total) by mouth daily, Starting Fri 2/24/2023, Normal      benztropine (COGENTIN) 2 mg tablet TAKE 1 TABLET BY MOUTH TWICE DAILY, Normal      buPROPion (WELLBUTRIN) 100 mg tablet Take 1 tablet (100 mg total) by mouth 2 (two) times a day Take bid--(once in the AM and once at 12 noon), Starting Wed 6/7/2023, Until Tue 9/5/2023, Normal      busPIRone (BUSPAR) 30 MG tablet Take 1 tablet (30 mg total) by mouth 2 (two) times a day, Starting Wed 6/7/2023, Until Tue 9/5/2023, Normal      clonazePAM (KlonoPIN) 1 mg tablet TAKE 1 TABLET BY MOUTH EVERY DAY AS NEEDED FOR ANXIETY OR PANIC ATTACKS, Normal      Insulin Pen Needle 31G X 8 MM MISC Check sugars three times a day, Normal      INSULIN SYRINGE .5CC/29G 29G X 1/2" 0.5 ML MISC by Does not apply route, Starting Tue 11/26/2013, Historical Med      lurasidone (Latuda) 120 mg tablet Take 1 tablet (120 mg total) by mouth daily with dinner, Starting Wed 6/7/2023, Normal      omeprazole (PriLOSEC) 20 mg delayed release capsule Take 1 capsule (20 mg total) by mouth daily, Starting Fri 2/24/2023, Normal      traZODone (DESYREL) 50 mg tablet Take 1/2 to 1 tab po qhs prn insomnia, Normal      Trulicity 1.5 BM/6.2FE injection INJECT ONCE WEEKLY, Normal      !! ammonium lactate (LAC-HYDRIN) 12 % cream Apply topically as needed for dry skin, Starting Mon 11/22/2021, Normal      !! ammonium lactate (LAC-HYDRIN) 12 % cream Apply topically as needed for dry skin, Starting Mon 11/21/2022, Normal      ascorbic acid (VITAMIN C) 250 mg tablet Take 1 tablet (250 mg total) by mouth daily, Starting Tue 9/14/2021, Normal      Blood Glucose Monitoring Suppl (FREESTYLE LITE) ANTONIA by Does not apply route 3 (three) times a day, Starting Fri 3/22/2019, Normal      Blood Glucose Monitoring Suppl (FreeStyle Lite) w/Device KIT USE AS INSTRUCTED 4 TIMES A DAY, Normal      cholecalciferol (VITAMIN D3) 1,000 units tablet Take 1 tablet (1,000 Units total) by mouth in the morning., Starting Wed 5/11/2022, Normal      Continuous Blood Gluc  (FreeStyle Clarence 14 Day [place]) ANTONIA Use  to check BG at least 4 times a day, Normal      Continuous Blood Gluc Sensor (FreeStyle [place] 14 Day Sensor) MISC Apply sensor every 14 days to check BG at least 4 times a day, Normal dextromethorphan-guaiFENesin (ROBITUSSIN DM)  mg/5 mL syrup Take 5 mL by mouth 4 (four) times a day as needed for cough or congestion, Starting Fri 1/14/2022, Normal      ferrous sulfate (EQL Iron Supplement Therapy) 325 (65 Fe) mg tablet Take 1 tablet (325 mg total) by mouth daily with breakfast, Starting Tue 9/14/2021, Normal      fluticasone (FLONASE) 50 mcg/act nasal spray 1 spray into each nostril daily, Starting Mon 1/10/2022, Normal      FREESTYLE LITE test strip CHECK BLOOD SUGARS FOUR TIMES DAILY, Normal      gabapentin (NEURONTIN) 300 mg capsule TAKE 1 CAPSULE(300 MG) BY MOUTH TWICE DAILY, Normal      glucose monitoring kit (FREESTYLE) monitoring kit Use 1 each 4 (four) times a day Fine to substitute other brand if not covered by insurance, Starting Tue 1/18/2022, Normal      Lancets (FREESTYLE) lancets USE THREE TIMES DAILY AS DIRECTED, Normal      lisinopril-hydrochlorothiazide (PRINZIDE,ZESTORETIC) 20-12.5 MG per tablet Take 1 tablet by mouth daily Do not start before June 1, 2023., Starting u 6/1/2023, No Print      sodium chloride (OCEAN) 0.65 % nasal spray 1 spray into each nostril daily at bedtime, Starting Mon 1/10/2022, Normal       !! - Potential duplicate medications found. Please discuss with provider. No discharge procedures on file.     PDMP Review       Value Time User    PDMP Reviewed  Yes 6/7/2023  3:19 PM Luis Damian PA-C          ED Provider  Electronically Signed by           Jose Maria Davies MD  08/02/23 2840

## 2023-08-02 NOTE — ASSESSMENT & PLAN NOTE
Discussed possible secondary Parkinson's due to current psychiatric regimen. He will call his psychiatrist to discuss potential side effects of medications. Will order labs to evaluate further. Will also consider brain MRI and carotid duplex. Encouraged adequate hydration. Avoid irritants such as alcohol and caffeine. ER precautions given. Stable at discharge.

## 2023-08-02 NOTE — ASSESSMENT & PLAN NOTE
Loss of appetite and lack of oral intake likely cause of unintentional weight loss. He did have a colonoscopy 5 years ago that was unremarkable. I did recommend a follow up with GI to ensure no contributing factors. He reports his mother had to have her stomach removed, but does not recall why.

## 2023-08-02 NOTE — ASSESSMENT & PLAN NOTE
Patient could not verify medication list. Current regimen appears to be: BuSpar 30 mg BID, Latuda 120 mg QD, Wellbutrin 100 mg BID, Klonopin 1 mg prn, gabapentin 300 mg BID, trazodone 50 mg qHS, and Cogentin 2 mg BID.  He will schedule a follow up with his psychiatrist.

## 2023-08-03 ENCOUNTER — OFFICE VISIT (OUTPATIENT)
Dept: PSYCHIATRY | Facility: CLINIC | Age: 58
End: 2023-08-03

## 2023-08-03 VITALS — WEIGHT: 263.8 LBS | HEIGHT: 66 IN | BODY MASS INDEX: 42.4 KG/M2

## 2023-08-03 DIAGNOSIS — F31.5 BIPOLAR DISORDER, CURRENT EPISODE DEPRESSED, SEVERE, WITH PSYCHOTIC FEATURES (HCC): Primary | ICD-10-CM

## 2023-08-03 DIAGNOSIS — G47.00 INSOMNIA, UNSPECIFIED TYPE: ICD-10-CM

## 2023-08-03 DIAGNOSIS — F41.0 PANIC ATTACKS: ICD-10-CM

## 2023-08-03 DIAGNOSIS — R29.818 EXTRAPYRAMIDAL SYMPTOM: ICD-10-CM

## 2023-08-03 DIAGNOSIS — F41.1 GENERALIZED ANXIETY DISORDER: Chronic | ICD-10-CM

## 2023-08-03 DIAGNOSIS — F31.61 BIPOLAR DISORDER, CURRENT EPISODE MIXED, MILD (HCC): ICD-10-CM

## 2023-08-03 DIAGNOSIS — G24.01 TARDIVE DYSKINESIA: ICD-10-CM

## 2023-08-03 RX ORDER — BENZTROPINE MESYLATE 2 MG/1
TABLET ORAL
Qty: 1 TABLET | Refills: 0
Start: 2023-08-03

## 2023-08-03 RX ORDER — LURASIDONE HYDROCHLORIDE 40 MG/1
TABLET, FILM COATED ORAL
Qty: 32 TABLET | Refills: 0 | Status: SHIPPED | OUTPATIENT
Start: 2023-08-03

## 2023-08-03 RX ORDER — OLANZAPINE 2.5 MG/1
TABLET ORAL
Qty: 30 TABLET | Refills: 0 | Status: SHIPPED | OUTPATIENT
Start: 2023-08-03
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
## 2023-08-04 ENCOUNTER — TELEPHONE (OUTPATIENT)
Dept: PSYCHIATRY | Facility: CLINIC | Age: 58
End: 2023-08-04

## 2023-08-04 NOTE — TELEPHONE ENCOUNTER
Writer left message to schedule follow up. Office number was left and patient was asked to call back.

## 2023-08-07 ENCOUNTER — APPOINTMENT (OUTPATIENT)
Dept: LAB | Facility: CLINIC | Age: 58
End: 2023-08-07
Payer: MEDICARE

## 2023-08-07 DIAGNOSIS — Z79.899 OTHER LONG TERM (CURRENT) DRUG THERAPY: ICD-10-CM

## 2023-08-07 DIAGNOSIS — R93.89 ABNORMAL FINDINGS ON DIAGNOSTIC IMAGING OF OTHER SPECIFIED BODY STRUCTURES: ICD-10-CM

## 2023-08-07 DIAGNOSIS — R79.89 OTHER SPECIFIED ABNORMAL FINDINGS OF BLOOD CHEMISTRY: ICD-10-CM

## 2023-08-07 DIAGNOSIS — R42 LIGHTHEADED: ICD-10-CM

## 2023-08-07 LAB
ATRIAL RATE: 87 BPM
BASOPHILS # BLD AUTO: 0.04 THOUSANDS/ÂΜL (ref 0–0.1)
BASOPHILS NFR BLD AUTO: 1 % (ref 0–1)
EOSINOPHIL # BLD AUTO: 0.21 THOUSAND/ÂΜL (ref 0–0.61)
EOSINOPHIL NFR BLD AUTO: 5 % (ref 0–6)
ERYTHROCYTE [DISTWIDTH] IN BLOOD BY AUTOMATED COUNT: 14.6 % (ref 11.6–15.1)
FERRITIN SERPL-MCNC: 28 NG/ML (ref 24–336)
HCT VFR BLD AUTO: 39.5 % (ref 36.5–49.3)
HGB BLD-MCNC: 13.1 G/DL (ref 12–17)
IMM GRANULOCYTES # BLD AUTO: 0.01 THOUSAND/UL (ref 0–0.2)
IMM GRANULOCYTES NFR BLD AUTO: 0 % (ref 0–2)
IRON SATN MFR SERPL: 16 % (ref 20–50)
IRON SERPL-MCNC: 39 UG/DL (ref 65–175)
LYMPHOCYTES # BLD AUTO: 1.31 THOUSANDS/ÂΜL (ref 0.6–4.47)
LYMPHOCYTES NFR BLD AUTO: 30 % (ref 14–44)
MCH RBC QN AUTO: 25.6 PG (ref 26.8–34.3)
MCHC RBC AUTO-ENTMCNC: 33.2 G/DL (ref 31.4–37.4)
MCV RBC AUTO: 77 FL (ref 82–98)
MONOCYTES # BLD AUTO: 0.5 THOUSAND/ÂΜL (ref 0.17–1.22)
MONOCYTES NFR BLD AUTO: 11 % (ref 4–12)
NEUTROPHILS # BLD AUTO: 2.36 THOUSANDS/ÂΜL (ref 1.85–7.62)
NEUTS SEG NFR BLD AUTO: 53 % (ref 43–75)
NRBC BLD AUTO-RTO: 0 /100 WBCS
P AXIS: 47 DEGREES
PLATELET # BLD AUTO: 185 THOUSANDS/UL (ref 149–390)
PMV BLD AUTO: 9.5 FL (ref 8.9–12.7)
PR INTERVAL: 186 MS
QRS AXIS: 18 DEGREES
QRSD INTERVAL: 82 MS
QT INTERVAL: 362 MS
QTC INTERVAL: 435 MS
RBC # BLD AUTO: 5.11 MILLION/UL (ref 3.88–5.62)
T WAVE AXIS: 41 DEGREES
T4 FREE SERPL-MCNC: 0.59 NG/DL (ref 0.61–1.12)
TIBC SERPL-MCNC: 239 UG/DL (ref 250–450)
TREPONEMA PALLIDUM IGG+IGM AB [PRESENCE] IN SERUM OR PLASMA BY IMMUNOASSAY: NORMAL
TSH SERPL DL<=0.05 MIU/L-ACNC: 2.14 UIU/ML (ref 0.45–4.5)
VENTRICULAR RATE: 87 BPM
VIT B12 SERPL-MCNC: 212 PG/ML (ref 180–914)
WBC # BLD AUTO: 4.43 THOUSAND/UL (ref 4.31–10.16)

## 2023-08-07 PROCEDURE — 83550 IRON BINDING TEST: CPT

## 2023-08-07 PROCEDURE — 93010 ELECTROCARDIOGRAM REPORT: CPT | Performed by: INTERNAL MEDICINE

## 2023-08-07 PROCEDURE — 84439 ASSAY OF FREE THYROXINE: CPT

## 2023-08-07 PROCEDURE — 85025 COMPLETE CBC W/AUTO DIFF WBC: CPT

## 2023-08-07 PROCEDURE — 82607 VITAMIN B-12: CPT

## 2023-08-07 PROCEDURE — 86780 TREPONEMA PALLIDUM: CPT

## 2023-08-07 PROCEDURE — 83540 ASSAY OF IRON: CPT

## 2023-08-07 PROCEDURE — 84443 ASSAY THYROID STIM HORMONE: CPT

## 2023-08-07 PROCEDURE — 82728 ASSAY OF FERRITIN: CPT

## 2023-08-07 PROCEDURE — 36415 COLL VENOUS BLD VENIPUNCTURE: CPT

## 2023-08-08 DIAGNOSIS — E53.8 LOW SERUM VITAMIN B12: ICD-10-CM

## 2023-08-08 DIAGNOSIS — R94.6 THYROID FUNCTION TEST ABNORMAL: ICD-10-CM

## 2023-08-08 DIAGNOSIS — E61.1 LOW SERUM IRON: Primary | ICD-10-CM

## 2023-08-08 RX ORDER — CYANOCOBALAMIN (VITAMIN B-12) 500 MCG
500 TABLET ORAL DAILY
Qty: 90 TABLET | Refills: 2 | Status: SHIPPED | OUTPATIENT
Start: 2023-08-08 | End: 2023-08-24 | Stop reason: ALTCHOICE

## 2023-08-08 RX ORDER — FERROUS SULFATE TAB EC 324 MG (65 MG FE EQUIVALENT) 324 (65 FE) MG
324 TABLET DELAYED RESPONSE ORAL EVERY OTHER DAY
Qty: 45 TABLET | Refills: 2 | Status: SHIPPED | OUTPATIENT
Start: 2023-08-08 | End: 2023-08-24 | Stop reason: ALTCHOICE
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
## 2023-08-14 ENCOUNTER — PATIENT OUTREACH (OUTPATIENT)
Dept: INTERNAL MEDICINE CLINIC | Facility: CLINIC | Age: 58
End: 2023-08-14

## 2023-08-14 ENCOUNTER — CONSULT (OUTPATIENT)
Dept: GASTROENTEROLOGY | Facility: AMBULARY SURGERY CENTER | Age: 58
End: 2023-08-14
Payer: MEDICARE

## 2023-08-14 VITALS
BODY MASS INDEX: 42.52 KG/M2 | HEIGHT: 66 IN | OXYGEN SATURATION: 98 % | WEIGHT: 264.6 LBS | DIASTOLIC BLOOD PRESSURE: 90 MMHG | SYSTOLIC BLOOD PRESSURE: 158 MMHG | HEART RATE: 83 BPM

## 2023-08-14 DIAGNOSIS — K21.9 GASTROESOPHAGEAL REFLUX DISEASE, UNSPECIFIED WHETHER ESOPHAGITIS PRESENT: ICD-10-CM

## 2023-08-14 DIAGNOSIS — N18.31 ANEMIA DUE TO STAGE 3A CHRONIC KIDNEY DISEASE (HCC): ICD-10-CM

## 2023-08-14 DIAGNOSIS — R63.4 UNINTENDED WEIGHT LOSS: Primary | ICD-10-CM

## 2023-08-14 DIAGNOSIS — D63.1 ANEMIA DUE TO STAGE 3A CHRONIC KIDNEY DISEASE (HCC): ICD-10-CM

## 2023-08-14 DIAGNOSIS — R63.0 LOSS OF APPETITE: ICD-10-CM

## 2023-08-14 PROCEDURE — 99203 OFFICE O/P NEW LOW 30 MIN: CPT | Performed by: PHYSICIAN ASSISTANT

## 2023-08-14 NOTE — PROGRESS NOTES
SW received a call from pt who is asking for help to find a new eye doctor. He shares his eyes are blurry and he needs an evalution and new glasses. He had been active but missed 2 appointments at the [facility] and was dismissed. [facility].  She request that JAMAAL place an [place] request and they can f/u with pt. His # is [phone]. JAMAAL has sent the [place] [facility] referral this date.

## 2023-08-14 NOTE — PATIENT INSTRUCTIONS
I&D tray at bedside. Scheduled date of EGD/colonoscopy (as of today): 9/14/23  Physician performing EGD/colonoscopy: Dr. Osman Arnett  Location of EGD/colonoscopy:  An GI lab  Desired bowel prep reviewed with patient: golytely  Instructions reviewed with patient by: Junior Fabry  Clearances: diabetic

## 2023-08-14 NOTE — LETTER
August 14, 2023     Shaye Cruz, [address] [facility] [address] [facility]    Patient: Council Rubinstein   YOB: 1965   Date of Visit: 8/14/2023       Dear Dr. Shaq Jones: Thank you for referring Mikayla Green to me for evaluation. Below are my notes for this consultation. If you have questions, please do not hesitate to call me. I look forward to following your patient along with you. Sincerely,        Brenden Ferrer PA-C        CC: No Recipients    Brenden Ferrer PA-C  8/14/2023 11:06 AM  Sign when Signing Visit  Assessment and Plan    #1. Decreased Appetite  - TSH 8/7/23 normal    #2. Unintentional Weight Loss  - get O&P x 3  - get CXR and CT Ab/Pel with IV and PO contrast    #3 GERD  - Continue omeprazole 20mg daily  - plan EGD    #4 Iron Deficiency Anemia  - continue oral iron supplementation  - PCP to follow labs and provide iron supplementation.  --------------------------------------------------------------------------------------------------------------------    Chief Complaint:  poor appetite, unintentional weight loss and GERD      HPI: Council Rubinstein is a 63 y.o. male with extensive PMH including DONALD on oral iron (recent CBC 8/7/23 with normal Hg, MCV 77, MCH 25.6, Iron 37, % sat 16, TIBC 239, Ferritin 37)), GERD (with history of hiatal hernia and schatzkis ring), DM(2), CKD (III)who presents today with poor appetite, unintentional weight loss and GERD, Bipolar, THERESE, panic attacks    Seen recently in the ER 7/31/23 for hallucinations. PCP follow up 8/2/23 with lightheadedness (MRI brain ordered) and Weight loss (25 pounds in past 6 months). Decreased appetite over the past month. Bowels constipated at times. No reports of vomiting blood, urinating blood, black or bloody stools.     Years of GERD - 20  Current medications - Omeprazole 20mg daily (some break through at times)  Past medications - TUMS, alkaseltzer  Pain or difficulty swallowing - No  Weight loss - Yes 25 lbs in 6 months  Last esophageal imaging - none recent  Last EGD - does not remember having one      Patient denies any nausea, vomiting, abdominal pain, dysphagia, diarrhea, blood in stool, or black tarry stools. Patient's last colonoscopy was around 2018 and unrevealing per patient. Does not remember if previous EGD.     Review of Systems:   General: negative for fatigue, fever, night sweats or unexpected weight loss  Psychological: negative for anxiety or depression  Ophthalmic: negative for blurry vision or scleral icterus  ENT: negative for headaches, oral lesions, sore throat, vocal changes or dysphagia  Hematological and Lymphatic: negative for pallor or swollen lymph nodes  Respiratory: negative for cough, shortness of breath or wheezing  Cardiovascular: negative for chest pain, edema or murmur  Gastrointestinal: as mentioned in HPI  Genito-Urinary: negative for dysuria or incontinence  Musculoskeletal: negative for joint pain, joint stiffness or joint swelling  Dermatological: negative for pruritus, rash, or jaundice    Current Medications  Current Outpatient Medications   Medication Sig Dispense Refill   • atorvastatin (LIPITOR) 20 mg tablet Take 1 tablet (20 mg total) by mouth daily 90 tablet 3   • clonazePAM (KlonoPIN) 1 mg tablet TAKE 1 TABLET BY MOUTH EVERY DAY AS NEEDED FOR ANXIETY OR PANIC ATTACKS 30 tablet 3   • lurasidone (Latuda) 40 mg tablet Take 2 tabs po qd with dinner x 1 week, then 1 1/2 tabs po qd x 1 week, then 1 tab po qd x 1 week, then stop 32 tablet 0   • omeprazole (PriLOSEC) 20 mg delayed release capsule Take 1 capsule (20 mg total) by mouth daily 90 capsule 3   • polyethylene glycol (GOLYTELY) 4000 mL solution Take 4,000 mL by mouth once for 1 dose 0100 mL 0   • Trulicity 1.5 VA/5.6KV injection INJECT ONCE WEEKLY 6 mL 1   • ammonium lactate (LAC-HYDRIN) 12 % cream Apply topically as needed for dry skin (Patient not taking: Reported on 3/1/2023) 385 g 0   • ammonium lactate (LAC-HYDRIN) 12 % cream Apply topically as needed for dry skin (Patient not taking: Reported on 2/24/2023) 385 g 0   • ascorbic acid (VITAMIN C) 250 mg tablet Take 1 tablet (250 mg total) by mouth daily (Patient not taking: Reported on 11/16/2021) 90 tablet 3   • benztropine (COGENTIN) 2 mg tablet TAKE 1/2 TABLET BY MOUTH TWICE DAILY X 1 WEEK, THEN REDUCE TO 1 TAB DAILY 1 tablet 0   • Blood Glucose Monitoring Suppl (FREESTYLE LITE) ANTONIA by Does not apply route 3 (three) times a day (Patient not taking: Reported on 11/21/2022) 1 each 0   • Blood Glucose Monitoring Suppl (FreeStyle Lite) w/Device KIT USE AS INSTRUCTED 4 TIMES A DAY (Patient not taking: Reported on 2/24/2023) 1 kit 0   • buPROPion (WELLBUTRIN) 100 mg tablet Take 1 tablet (100 mg total) by mouth 2 (two) times a day Take bid--(once in the AM and once at 12 noon) 180 tablet 1   • busPIRone (BUSPAR) 30 MG tablet Take 1 tablet (30 mg total) by mouth 2 (two) times a day (Patient not taking: Reported on 8/14/2023) 180 tablet 1   • cholecalciferol (VITAMIN D3) 1,000 units tablet Take 1 tablet (1,000 Units total) by mouth in the morning.  (Patient not taking: Reported on 5/23/2022) 30 tablet 0   • Continuous Blood Gluc  (FreeStyle [place] 14 Day [place]) ANTONIA Use  to check BG at least 4 times a day (Patient not taking: Reported on 2/21/2022) 1 each 0   • Continuous Blood Gluc Sensor (FreeStyle [place] 14 Day Sensor) MISC Apply sensor every 14 days to check BG at least 4 times a day (Patient not taking: Reported on 2/21/2022) 2 each 5   • ferrous sulfate 324 (65 Fe) mg Take 1 tablet (324 mg total) by mouth every other day (Patient not taking: Reported on 8/14/2023) 45 tablet 2   • fluticasone (FLONASE) 50 mcg/act nasal spray 1 spray into each nostril daily (Patient not taking: Reported on 2/21/2022) 18.2 mL 1   • FREESTYLE LITE test strip CHECK BLOOD SUGARS FOUR TIMES DAILY (Patient not taking: Reported on 11/21/2022) 400 strip 3   • gabapentin (NEURONTIN) 300 mg capsule TAKE 1 CAPSULE(300 MG) BY MOUTH TWICE DAILY (Patient not taking: Reported on 7/31/2023) 180 capsule 3   • glucose monitoring kit (FREESTYLE) monitoring kit Use 1 each 4 (four) times a day Fine to substitute other brand if not covered by insurance (Patient not taking: Reported on 2/15/2023) 1 each 0   • Insulin Pen Needle 31G X 8 MM MISC Check sugars three times a day (Patient not taking: Reported on 8/14/2023) 100 each 1   • INSULIN SYRINGE .5CC/29G 29G X 1/2" 0.5 ML MISC by Does not apply route (Patient not taking: Reported on 8/14/2023)     • Lancets (FREESTYLE) lancets USE THREE TIMES DAILY AS DIRECTED (Patient not taking: Reported on 11/21/2022) 300 each 0   • lisinopril-hydrochlorothiazide (PRINZIDE,ZESTORETIC) 20-12.5 MG per tablet Take 1 tablet by mouth daily Do not start before June 1, 2023. (Patient not taking: Reported on 7/5/2023) 90 tablet 3   • OLANZapine (ZyPREXA) 2.5 mg tablet To start in 1 week: take 1 tab po qhs 30 tablet 0   • sodium chloride (OCEAN) 0.65 % nasal spray 1 spray into each nostril daily at bedtime (Patient not taking: Reported on 6/14/2022) 104 mL 2   • traZODone (DESYREL) 50 mg tablet Take 1/2 to 1 tab po qhs prn insomnia (Patient not taking: Reported on 8/14/2023) 90 tablet 1   • vitamin B-12 (CYANOCOBALAMIN) 500 MCG TABS Take 1 tablet (500 mcg total) by mouth daily (Patient not taking: Reported on 8/14/2023) 90 tablet 2     No current facility-administered medications for this visit.        Past Medical History  Past Medical History:   Diagnosis Date   • ADHD, adult residual type    • Anxiety    • Anxiety    • Bipolar 1 disorder ([address])    • Cataract     Left eye   • Depression    • Depression    • Diabetes mellitus (HCC)     blood sugar 167 on admission   • Equinus deformity of foot    • GERD (gastroesophageal reflux disease)    • Homicidal ideations    • Hyperlipidemia    • Hypertension    • Microalbuminuria    • Morbid obesity ([address])    • Neuropathy    • Shortness of breath    • Sleep apnea     CPAP at bedtime   • Sleep apnea    • Stroke [facility])    • Suicidal intent        Past Surgical History  Past Surgical History:   Procedure Laterality Date   • CATARACT EXTRACTION     • CATARACT EXTRACTION     • HAND SURGERY Right    • OTHER SURGICAL HISTORY      REPAIR OF SUPERFICIAL WOUND FACE   • [place] ESOPHAGOGASTRODUODENOSCOPY TRANSORAL DIAGNOSTIC N/A 2018    Procedure: ESOPHAGOGASTRODUODENOSCOPY (EGD); Surgeon: Thomas Strange MD;  Location: [facility]; Service: Gastroenterology   • [place] ESOPHAGOGASTRODUODENOSCOPY TRANSORAL DIAGNOSTIC N/A 2018    Procedure: EGD AND COLONOSCOPY;  Surgeon: Thomas Strange MD;  Location: [facility]; Service: Gastroenterology       Past Social History   Social History     Socioeconomic History   • Marital status: /Civil Union     Spouse name: None   • Number of children: 1   • Years of education: None   • Highest education level: None   Occupational History   • Occupation: On disability   Tobacco Use   • Smoking status: Former     Types: Cigarettes     Quit date:      Years since quittin.6   • Smokeless tobacco: Former     Types: Chew   • Tobacco comments:     pt "Quit after approx over 25 years ago"   Vaping Use   • Vaping Use: Never used   Substance and Sexual Activity   • Alcohol use: Yes     Comment: rarely   • Drug use: Not Currently     Comment: quit 20 years ago   • Sexual activity: Yes     Partners: Female     Birth control/protection: None     Comment: steady girlfriend   Other Topics Concern   • None   Social History Narrative     from spouse, technically still  but living with other partner, partner's father, and early 2019, his partner's daughter and boyfriend moved in with their two children. Then the boyfriend moved out in 2019. Then partner's daughter moved out approx 2021. (Pt's partner has guardianship of the grandchildren per Pt).         Children: 1 stepdaughter         Education: Pt denies any hx of learning disabilities and reached childhood milestones on time as far as he knows. He wore braces for equinovarus but states he did not suffer delay in walking    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 because "I was just lazy, didn't do the work."    No college    Took some core classes in [employer] and worked as a volunteer  from approx [phone]             Substance Abuse History:     Pt drinks ETOH approx q 3-4 months but denies any h/o ETOH abuse. Pt tried smoking Crack once in the past and has been smoking THC once q 2-3 months since 12y/o. He denies other drug use, IVDA, addictions or drug abuse. Social Determinants of Health     Financial Resource Strain: Low Risk  (2/24/2023)    Overall Financial Resource Strain (CARDIA)    • Difficulty of Paying Living Expenses: Not hard at all   Food Insecurity: No Food Insecurity (2/24/2023)    Hunger Vital Sign    • Worried About Running Out of Food in the Last Year: Never true    • Ran Out of Food in the Last Year: Never true   Transportation Needs: No Transportation Needs (2/24/2023)    PRAPARE - Transportation    • Lack of Transportation (Medical): No    • Lack of Transportation (Non-Medical): No   Physical Activity: Inactive (9/14/2021)    Exercise Vital Sign    • Days of Exercise per Week: 0 days    • Minutes of Exercise per Session: 0 min   Stress: No Stress Concern Present (9/14/2021)    [facility]    • Feeling of Stress : Not at all   Social Connections:  Moderately Isolated (9/14/2021)    Social Connection and Isolation Panel [NHANES]    • Frequency of Communication with Friends and Family: More than three times a week    • Frequency of Social Gatherings with Friends and Family: Once a week    • Attends Shinto Services: Never    • Active Member of Clubs or Organizations: No    • Attends Club or Organization Meetings: Never    • Marital Status: Living with partner   Intimate Partner Violence: Not At Risk (9/14/2021)    Humiliation, Afraid, Rape, and Kick questionnaire    • Fear of Current or Ex-Partner: No    • Emotionally Abused: No    • Physically Abused: No    • Sexually Abused: No   Housing Stability: Low Risk  (5/11/2022)    Housing Stability Vital Sign    • Unable to Pay for Housing in the Last Year: No    • Number of [address] in the Last Year: 1    • Unstable Housing in the Last Year: No         Vital Signs  Vitals:    08/14/23 1012   BP: 158/90   BP Location: Left arm   Patient Position: Sitting   Cuff Size: Standard   Pulse: 83   SpO2: 98%   Weight: 120 kg (264 lb 9.6 oz)   Height: 5' 6" (1.676 m)       Physical Exam:  General appearance: alert, cooperative, no distress  HEENT: normocephalic, anicteric, no eye erythema or discharge, no oropharyngeal thrush  Neck: supple, trachea midline, no adenopathy  Lungs: CTA b/l, no rales, rhonchi, or wheezing, unlabored respirations  Heart: RRR, no murmur, rubs, or gallops  Abdomen: soft, non-tender, non-distended, normal bowel sounds, no masses or organomegaly  Rectal: deferred  Extremities: 1+ edema B/L LE, no cyanosis, clubbing  Musculoskeletal: normal gait  Skin: color and texture normal, no jaundice, no rashes or lesions  Psychiatric: alert and oriented, difficult time sitting still. Keith Powers PA-C

## 2023-08-14 NOTE — LETTER
August 14, 2023     Elise Alas, [address] [facility] [address] [facility]    Patient: Justin Maki   YOB: 1965   Date of Visit: 8/14/2023       Dear Dr. Jacquelyn Carlos: Thank you for referring Sathya Early to me for evaluation. Below are my notes for this consultation. If you have questions, please do not hesitate to call me. I look forward to following your patient along with you. Sincerely,        Parveen Anaya PA-C        CC: No Recipients    Tia Hathaway  8/14/2023 10:44 AM  Incomplete  Assessment and Plan    #1. Decreased Appetite  - TSH 8/7/23 normal    #2. Unintentional Weight Loss  - get O&P x 3  - get CXR and CT Ab/Pel with IV and PO contrast    #3 GERD  - Continue omeprazole 20mg daily  - plan EGD    #4 Iron Deficiency Anemia  - continue oral iron supplementation  - PCP to follow labs and provide iron supplementation.  --------------------------------------------------------------------------------------------------------------------    Chief Complaint:  poor appetite, unintentional weight loss and GERD      HPI: Justin Maki is a 63 y.o. male with extensive PMH including DONALD on oral iron (recent CBC 8/7/23 with normal Hg, MCV 77, MCH 25.6, Iron 37, % sat 16, TIBC 239, Ferritin 37)), GERD (with history of hiatal hernia and schatzkis ring), DM(2), CKD (III)who presents today with poor appetite, unintentional weight loss and GERD, Bipolar, THERESE, panic attacks    Seen recently in the ER 7/31/23 for hallucinations. PCP follow up 8/2/23 with lightheadedness (MRI brain ordered) and Weight loss (25 pounds in past 6 months). Decreased appetite over the past month. Bowels constipated at times. No reports of vomiting blood, urinating blood, black or bloody stools.     Years of GERD - 20  Current medications - Omeprazole 20mg daily (some break through at times)  Past medications - TUMS, alkaseltzer  Pain or difficulty swallowing - No  Weight loss - Yes 25 lbs in 6 months  Last esophageal imaging - none recent  Last EGD - does not remember having one      Patient denies any nausea, vomiting, abdominal pain, dysphagia, diarrhea, blood in stool, or black tarry stools. Patient's last colonoscopy was around 2018 and unrevealing per patient. Does not remember if previous EGD.     Review of Systems:   General: negative for fatigue, fever, night sweats or unexpected weight loss  Psychological: negative for anxiety or depression  Ophthalmic: negative for blurry vision or scleral icterus  ENT: negative for headaches, oral lesions, sore throat, vocal changes or dysphagia  Hematological and Lymphatic: negative for pallor or swollen lymph nodes  Respiratory: negative for cough, shortness of breath or wheezing  Cardiovascular: negative for chest pain, edema or murmur  Gastrointestinal: as mentioned in HPI  Genito-Urinary: negative for dysuria or incontinence  Musculoskeletal: negative for joint pain, joint stiffness or joint swelling  Dermatological: negative for pruritus, rash, or jaundice    Current Medications  Current Outpatient Medications   Medication Sig Dispense Refill   • ammonium lactate (LAC-HYDRIN) 12 % cream Apply topically as needed for dry skin (Patient not taking: Reported on 3/1/2023) 385 g 0   • ammonium lactate (LAC-HYDRIN) 12 % cream Apply topically as needed for dry skin (Patient not taking: Reported on 2/24/2023) 385 g 0   • ascorbic acid (VITAMIN C) 250 mg tablet Take 1 tablet (250 mg total) by mouth daily (Patient not taking: Reported on 11/16/2021) 90 tablet 3   • atorvastatin (LIPITOR) 20 mg tablet Take 1 tablet (20 mg total) by mouth daily 90 tablet 3   • benztropine (COGENTIN) 2 mg tablet TAKE 1/2 TABLET BY MOUTH TWICE DAILY X 1 WEEK, THEN REDUCE TO 1 TAB DAILY 1 tablet 0   • Blood Glucose Monitoring Suppl (FREESTYLE LITE) ANTONIA by Does not apply route 3 (three) times a day (Patient not taking: Reported on 11/21/2022) 1 each 0   • Blood Glucose Monitoring Suppl (FreeStyle Lite) w/Device KIT USE AS INSTRUCTED 4 TIMES A DAY (Patient not taking: Reported on 2/24/2023) 1 kit 0   • buPROPion (WELLBUTRIN) 100 mg tablet Take 1 tablet (100 mg total) by mouth 2 (two) times a day Take bid--(once in the AM and once at 12 noon) 180 tablet 1   • busPIRone (BUSPAR) 30 MG tablet Take 1 tablet (30 mg total) by mouth 2 (two) times a day (Patient taking differently: Take 15 mg by mouth 2 (two) times a day) 180 tablet 1   • cholecalciferol (VITAMIN D3) 1,000 units tablet Take 1 tablet (1,000 Units total) by mouth in the morning.  (Patient not taking: Reported on 5/23/2022) 30 tablet 0   • clonazePAM (KlonoPIN) 1 mg tablet TAKE 1 TABLET BY MOUTH EVERY DAY AS NEEDED FOR ANXIETY OR PANIC ATTACKS 30 tablet 3   • Continuous Blood Gluc  (FreeStyle Clarence 14 Day [place]) ANTONIA Use  to check BG at least 4 times a day (Patient not taking: Reported on 2/21/2022) 1 each 0   • Continuous Blood Gluc Sensor ([place] mobileStyle Clarence 14 Day Sensor) MISC Apply sensor every 14 days to check BG at least 4 times a day (Patient not taking: Reported on 2/21/2022) 2 each 5   • ferrous sulfate 324 (65 Fe) mg Take 1 tablet (324 mg total) by mouth every other day 45 tablet 2   • fluticasone (FLONASE) 50 mcg/act nasal spray 1 spray into each nostril daily (Patient not taking: Reported on 2/21/2022) 18.2 mL 1   • FREESTYLE LITE test strip CHECK BLOOD SUGARS FOUR TIMES DAILY (Patient not taking: Reported on 11/21/2022) 400 strip 3   • gabapentin (NEURONTIN) 300 mg capsule TAKE 1 CAPSULE(300 MG) BY MOUTH TWICE DAILY (Patient not taking: Reported on 7/31/2023) 180 capsule 3   • glucose monitoring kit (FREESTYLE) monitoring kit Use 1 each 4 (four) times a day Fine to substitute other brand if not covered by insurance (Patient not taking: Reported on 2/15/2023) 1 each 0   • Insulin Pen Needle 31G X 8 MM MISC Check sugars three times a day 100 each 1   • INSULIN SYRINGE .5CC/29G 29G X 1/2" 0.5 ML MISC by Does not apply route     • Lancets (FREESTYLE) lancets USE THREE TIMES DAILY AS DIRECTED (Patient not taking: Reported on 11/21/2022) 300 each 0   • lisinopril-hydrochlorothiazide (PRINZIDE,ZESTORETIC) 20-12.5 MG per tablet Take 1 tablet by mouth daily Do not start before June 1, 2023. (Patient not taking: Reported on 7/5/2023) 90 tablet 3   • lurasidone (Latuda) 40 mg tablet Take 2 tabs po qd with dinner x 1 week, then 1 1/2 tabs po qd x 1 week, then 1 tab po qd x 1 week, then stop 32 tablet 0   • OLANZapine (ZyPREXA) 2.5 mg tablet To start in 1 week: take 1 tab po qhs 30 tablet 0   • omeprazole (PriLOSEC) 20 mg delayed release capsule Take 1 capsule (20 mg total) by mouth daily 90 capsule 3   • sodium chloride (OCEAN) 0.65 % nasal spray 1 spray into each nostril daily at bedtime (Patient not taking: Reported on 6/14/2022) 104 mL 2   • traZODone (DESYREL) 50 mg tablet Take 1/2 to 1 tab po qhs prn insomnia 90 tablet 1   • Trulicity 1.5 NW/4.3MK injection INJECT ONCE WEEKLY 6 mL 1   • vitamin B-12 (CYANOCOBALAMIN) 500 MCG TABS Take 1 tablet (500 mcg total) by mouth daily 90 tablet 2     No current facility-administered medications for this visit.        Past Medical History  Past Medical History:   Diagnosis Date   • ADHD, adult residual type    • Anxiety    • Anxiety    • Bipolar 1 disorder ([address])    • Cataract     Left eye   • Depression    • Depression    • Diabetes mellitus (HCC)     blood sugar 167 on admission   • Equinus deformity of foot    • GERD (gastroesophageal reflux disease)    • Homicidal ideations    • Hyperlipidemia    • Hypertension    • Microalbuminuria    • Morbid obesity ([address])    • Neuropathy    • Shortness of breath    • Sleep apnea     CPAP at bedtime   • Sleep apnea    • Stroke [facility])    • Suicidal intent        Past Surgical History  Past Surgical History:   Procedure Laterality Date   • CATARACT EXTRACTION     • CATARACT EXTRACTION     • HAND SURGERY Right    • OTHER SURGICAL HISTORY      REPAIR OF SUPERFICIAL WOUND FACE   • [place] ESOPHAGOGASTRODUODENOSCOPY TRANSORAL DIAGNOSTIC N/A 2018    Procedure: ESOPHAGOGASTRODUODENOSCOPY (EGD); Surgeon: Aaliyah Shah MD;  [facility]; Service: Gastroenterology   • [place] ESOPHAGOGASTRODUODENOSCOPY TRANSORAL DIAGNOSTIC N/A 2018    Procedure: EGD AND COLONOSCOPY;  Surgeon: Aaliyah Shah MD;  [facility]; Service: Gastroenterology       Past Social History   Social History     Socioeconomic History   • Marital status: /Civil Union     Spouse name: Not on file   • Number of children: 1   • Years of education: Not on file   • Highest education level: Not on file   Occupational History   • Occupation: On disability   Tobacco Use   • Smoking status: Former     Types: Cigarettes     Quit date:      Years since quittin.6   • Smokeless tobacco: Former     Types: Chew   • Tobacco comments:     pt "Quit after approx over 25 years ago"   Vaping Use   • Vaping Use: Never used   Substance and Sexual Activity   • Alcohol use: Yes     Comment: rarely   • Drug use: Not Currently     Comment: quit 20 years ago   • Sexual activity: Yes     Partners: Female     Birth control/protection: None     Comment: steady girlfriend   Other Topics Concern   • Not on file   Social History Narrative     from spouse, technically still  but living with other partner, partner's father, and early 2019, his partner's daughter and boyfriend moved in with their two children. Then the boyfriend moved out in 2019. Then partner's daughter moved out approx 2021. (Pt's partner has guardianship of the grandchildren per Pt). Children: 1 stepdaughter         Education:    Pt denies any hx of learning disabilities and reached childhood milestones on time as far as he knows.   He wore braces for equinovarus but states he did not suffer delay in walking    Graduated High School --he was held back 3 times because "I was just lazy, didn't do the work."    No college    Took some core classes in [employer] and worked as a volunteer  from approx [phone]             Substance Abuse History:     Pt drinks ETOH approx q 3-4 months but denies any h/o ETOH abuse. Pt tried smoking Crack once in the past and has been smoking THC once q 2-3 months since 12y/o. He denies other drug use, IVDA, addictions or drug abuse. Social Determinants of Health     Financial Resource Strain: Low Risk  (2/24/2023)    Overall Financial Resource Strain (CARDIA)    • Difficulty of Paying Living Expenses: Not hard at all   Food Insecurity: No Food Insecurity (2/24/2023)    Hunger Vital Sign    • Worried About Running Out of Food in the Last Year: Never true    • Ran Out of Food in the Last Year: Never true   Transportation Needs: No Transportation Needs (2/24/2023)    PRAPARE - Transportation    • Lack of Transportation (Medical): No    • Lack of Transportation (Non-Medical): No   Physical Activity: Inactive (9/14/2021)    Exercise Vital Sign    • Days of Exercise per Week: 0 days    • Minutes of Exercise per Session: 0 min   Stress: No Stress Concern Present (9/14/2021)    [address] [facility]    • Feeling of Stress : Not at all   Social Connections:  Moderately Isolated (9/14/2021)    Social Connection and Isolation Panel [NHANES]    • Frequency of Communication with Friends and Family: More than three times a week    • Frequency of Social Gatherings with Friends and Family: Once a week    • Attends Congregational Services: Never    • Active Member of Clubs or Organizations: No    • Attends Club or Organization Meetings: Never    • Marital Status: Living with partner   Intimate Partner Violence: Not At Risk (9/14/2021)    Humiliation, Afraid, Rape, and Kick questionnaire    • Fear of Current or Ex-Partner: No    • Emotionally Abused: No    • Physically Abused: No    • Sexually Abused: No   Housing Stability: Low Risk  (5/11/2022)    Housing Stability Vital Sign    • Unable to Pay for Housing in the Last Year: No    • Number of Places Lived in the Last Year: 1    • Unstable Housing in the Last Year: No       {Common ambulatory SmartLinks:37064}    Vital Signs  There were no vitals filed for this visit. Physical Exam:  General appearance: alert, cooperative, no distress  HEENT: normocephalic, anicteric, no eye erythema or discharge, no oropharyngeal thrush  Neck: supple, trachea midline, no adenopathy  Lungs: CTA b/l, no rales, rhonchi, or wheezing, unlabored respirations  Heart: RRR, no murmur, rubs, or gallops  Abdomen: soft, non-tender, non-distended, normal bowel sounds, no masses or organomegaly  Rectal: deferred  Extremities: no cyanosis, clubbing, or edema  Musculoskeletal: normal gait  Skin: color and texture normal, no jaundice, no rashes or lesions  Psychiatric: alert and oriented, normal affect and behavior                                                                    Alyson BARRIENTOS [place], [place]  8/14/2023 10:35 AM  Sign when Signing Visit  Assessment and Plan    #1. Decreased Appetite  - TSH 8/7/23 normal    #2.  Unintentional Weight Loss  - get O&P x 3  - get CXR and CT Ab/Pel with IV and PO contrast    #3 GERD  - Continue omeprazole 20mg daily  - plan EGD    #4 Iron Deficiency Anemia  - continue oral iron supplementation  --------------------------------------------------------------------------------------------------------------------    Chief Complaint:  poor appetite, unintentional weight loss and GERD      HPI: Daniel Odom is a 63 y.o. male with extensive PMH including DONALD on oral iron (recent CBC 8/7/23 with normal Hg, MCV 77, MCH 25.6, Iron 37, % sat 16, TIBC 239, Ferritin 37)), GERD (with history of hiatal hernia and schatzkis ring), DM(2), CKD (III)who presents today with poor appetite, unintentional weight loss and GERD, Bipolar, THERESE, panic attacks    Seen recently in the ER 7/31/23 for hallucinations. PCP follow up 8/2/23 with lightheadedness (MRI brain ordered) and Weight loss (25 pounds in past 6 months). Decreased appetite over the past month. Bowels constipated at times. Years of GERD - 20  Current medications - Omeprazole 20mg daily  Past medications - TUMS, alkaseltzer  Pain or difficulty swallowing - No  Weight loss - Yes 25 lbs in 6 months  Last esophageal imaging - none recent  Last EGD - does not remember having one      Patient denies any nausea, vomiting, abdominal pain, dysphagia, diarrhea, constipation, blood in stool, or black tarry stools. Patient's last colonoscopy was around 2018 and unrevealing per patient. Does not remember if previous EGD.     Review of Systems:   General: negative for fatigue, fever, night sweats or unexpected weight loss  Psychological: negative for anxiety or depression  Ophthalmic: negative for blurry vision or scleral icterus  ENT: negative for headaches, oral lesions, sore throat, vocal changes or dysphagia  Hematological and Lymphatic: negative for pallor or swollen lymph nodes  Respiratory: negative for cough, shortness of breath or wheezing  Cardiovascular: negative for chest pain, edema or murmur  Gastrointestinal: as mentioned in HPI  Genito-Urinary: negative for dysuria or incontinence  Musculoskeletal: negative for joint pain, joint stiffness or joint swelling  Dermatological: negative for pruritus, rash, or jaundice    Current Medications  Current Outpatient Medications   Medication Sig Dispense Refill   • ammonium lactate (LAC-HYDRIN) 12 % cream Apply topically as needed for dry skin (Patient not taking: Reported on 3/1/2023) 385 g 0   • ammonium lactate (LAC-HYDRIN) 12 % cream Apply topically as needed for dry skin (Patient not taking: Reported on 2/24/2023) 385 g 0   • ascorbic acid (VITAMIN C) 250 mg tablet Take 1 tablet (250 mg total) by mouth daily (Patient not taking: Reported on 11/16/2021) 90 tablet 3   • atorvastatin (LIPITOR) 20 mg tablet Take 1 tablet (20 mg total) by mouth daily 90 tablet 3   • benztropine (COGENTIN) 2 mg tablet TAKE 1/2 TABLET BY MOUTH TWICE DAILY X 1 WEEK, THEN REDUCE TO 1 TAB DAILY 1 tablet 0   • Blood Glucose Monitoring Suppl (FREESTYLE LITE) ANTONIA by Does not apply route 3 (three) times a day (Patient not taking: Reported on 11/21/2022) 1 each 0   • Blood Glucose Monitoring Suppl (FreeStyle Lite) w/Device KIT USE AS INSTRUCTED 4 TIMES A DAY (Patient not taking: Reported on 2/24/2023) 1 kit 0   • buPROPion (WELLBUTRIN) 100 mg tablet Take 1 tablet (100 mg total) by mouth 2 (two) times a day Take bid--(once in the AM and once at 12 noon) 180 tablet 1   • busPIRone (BUSPAR) 30 MG tablet Take 1 tablet (30 mg total) by mouth 2 (two) times a day (Patient taking differently: Take 15 mg by mouth 2 (two) times a day) 180 tablet 1   • cholecalciferol (VITAMIN D3) 1,000 units tablet Take 1 tablet (1,000 Units total) by mouth in the morning.  (Patient not taking: Reported on 5/23/2022) 30 tablet 0   • clonazePAM (KlonoPIN) 1 mg tablet TAKE 1 TABLET BY MOUTH EVERY DAY AS NEEDED FOR ANXIETY OR PANIC ATTACKS 30 tablet 3   • Continuous Blood Gluc  (FreeStyle Clarence 14 Day [place]) ANTONIA Use  to check BG at least 4 times a day (Patient not taking: Reported on 2/21/2022) 1 each 0   • Continuous Blood Gluc Sensor (FreeStyle Clarence 14 Day Sensor) MISC Apply sensor every 14 days to check BG at least 4 times a day (Patient not taking: Reported on 2/21/2022) 2 each 5   • ferrous sulfate 324 (65 Fe) mg Take 1 tablet (324 mg total) by mouth every other day 45 tablet 2   • fluticasone (FLONASE) 50 mcg/act nasal spray 1 spray into each nostril daily (Patient not taking: Reported on 2/21/2022) 18.2 mL 1   • FREESTYLE LITE test strip CHECK BLOOD SUGARS FOUR TIMES DAILY (Patient not taking: Reported on 11/21/2022) 400 strip 3   • gabapentin (NEURONTIN) 300 mg capsule TAKE 1 CAPSULE(300 MG) BY MOUTH TWICE DAILY (Patient not taking: Reported on 7/31/2023) 180 capsule 3   • glucose monitoring kit (FREESTYLE) monitoring kit Use 1 each 4 (four) times a day Fine to substitute other brand if not covered by insurance (Patient not taking: Reported on 2/15/2023) 1 each 0   • Insulin Pen Needle 31G X 8 MM MISC Check sugars three times a day 100 each 1   • INSULIN SYRINGE .5CC/29G 29G X 1/2" 0.5 ML MISC by Does not apply route     • Lancets (FREESTYLE) lancets USE THREE TIMES DAILY AS DIRECTED (Patient not taking: Reported on 11/21/2022) 300 each 0   • lisinopril-hydrochlorothiazide (PRINZIDE,ZESTORETIC) 20-12.5 MG per tablet Take 1 tablet by mouth daily Do not start before June 1, 2023. (Patient not taking: Reported on 7/5/2023) 90 tablet 3   • lurasidone (Latuda) 40 mg tablet Take 2 tabs po qd with dinner x 1 week, then 1 1/2 tabs po qd x 1 week, then 1 tab po qd x 1 week, then stop 32 tablet 0   • OLANZapine (ZyPREXA) 2.5 mg tablet To start in 1 week: take 1 tab po qhs 30 tablet 0   • omeprazole (PriLOSEC) 20 mg delayed release capsule Take 1 capsule (20 mg total) by mouth daily 90 capsule 3   • sodium chloride (OCEAN) 0.65 % nasal spray 1 spray into each nostril daily at bedtime (Patient not taking: Reported on 6/14/2022) 104 mL 2   • traZODone (DESYREL) 50 mg tablet Take 1/2 to 1 tab po qhs prn insomnia 90 tablet 1   • Trulicity 1.5 BT/3.4TR injection INJECT ONCE WEEKLY 6 mL 1   • vitamin B-12 (CYANOCOBALAMIN) 500 MCG TABS Take 1 tablet (500 mcg total) by mouth daily 90 tablet 2     No current facility-administered medications for this visit.        Past Medical History  Past Medical History:   Diagnosis Date   • ADHD, adult residual type    • Anxiety    • Anxiety    • Bipolar 1 disorder ([address])    • Cataract     Left eye   • Depression    • Depression    • Diabetes mellitus (HCC)     blood sugar 167 on admission   • Equinus deformity of foot    • GERD (gastroesophageal reflux disease)    • Homicidal ideations    • Hyperlipidemia • Hypertension    • Microalbuminuria    • Morbid obesity ([address])    • Neuropathy    • Shortness of breath    • Sleep apnea     CPAP at bedtime   • Sleep apnea    • Stroke [facility])    • Suicidal intent        Past Surgical History  Past Surgical History:   Procedure Laterality Date   • CATARACT EXTRACTION     • CATARACT EXTRACTION     • HAND SURGERY Right    • OTHER SURGICAL HISTORY      REPAIR OF SUPERFICIAL WOUND FACE   • [place] ESOPHAGOGASTRODUOD[place]OSCOPY TRANSORAL DIAGNOSTIC N/A 2018    Procedure: ESOPHAGOGASTRODUODENOSCOPY (EGD); Surgeon: Nathaniel Cm MD;  [facility]; Service: Gastroenterology   • [place] ESOPHAGOGASTRODUODENOSCOPY TRANSORAL DIAGNOSTIC N/A 2018    Procedure: EGD AND COLONOSCOPY;  Surgeon: Nathaniel Cm MD;  [facility]; Service: Gastroenterology       Past Social History   Social History     Socioeconomic History   • Marital status: /Civil Union     Spouse name: Not on file   • Number of children: 1   • Years of education: Not on file   • Highest education level: Not on file   Occupational History   • Occupation: On disability   Tobacco Use   • Smoking status: Former     Types: Cigarettes     Quit date:      Years since quittin.6   • Smokeless tobacco: Former     Types: Chew   • Tobacco comments:     pt "Quit after approx over 25 years ago"   Vaping Use   • Vaping Use: Never used   Substance and Sexual Activity   • Alcohol use: Yes     Comment: rarely   • Drug use: Not Currently     Comment: quit 20 years ago   • Sexual activity: Yes     Partners: Female     Birth control/protection: None     Comment: steady girlfriend   Other Topics Concern   • Not on file   Social History Narrative     from spouse, technically still  but living with other partner, partner's father, and early 2019, his partner's daughter and boyfriend moved in with their two children. Then the boyfriend moved out in 2019. Then partner's daughter moved out approx 2021. (Pt's partner has guardianship of the grandchildren per Pt). Children: 1 stepdaughter         Education:    Pt denies any hx of learning disabilities and reached childhood milestones on time as far as he knows. He wore braces for equinovarus but states he did not suffer delay in walking    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 because "I was just lazy, didn't do the work."    No college    Took some core classes in [employer] and worked as a volunteer  from approx [id number]             Substance Abuse History:     Pt drinks ETOH approx q 3-4 months but denies any h/o ETOH abuse. Pt tried smoking Crack once in the past and has been smoking THC once q 2-3 months since 14y/o. He denies other drug use, IVDA, addictions or drug abuse. Social Determinants of Health     Financial Resource Strain: Low Risk  (2/24/2023)    Overall Financial Resource Strain (CARDIA)    • Difficulty of Paying Living Expenses: Not hard at all   Food Insecurity: No Food Insecurity (2/24/2023)    Hunger Vital Sign    • Worried About Running Out of Food in the Last Year: Never true    • Ran Out of Food in the Last Year: Never true   Transportation Needs: No Transportation Needs (2/24/2023)    PRAPARE - Transportation    • Lack of Transportation (Medical): No    • Lack of Transportation (Non-Medical): No   Physical Activity: Inactive (9/14/2021)    Exercise Vital Sign    • Days of Exercise per Week: 0 days    • Minutes of Exercise per Session: 0 min   Stress: No Stress Concern Present (9/14/2021)    [facility]    • Feeling of Stress : Not at all   Social Connections:  Moderately Isolated (9/14/2021)    Social Connection and Isolation Panel [NHANES]    • Frequency of Communication with Friends and Family: More than three times a week    • Frequency of Social Gatherings with Friends and Family: Once a week    • Attends [employer] Services: Never    • Active Member of Clubs or Organizations: No    • Attends Club or Organization Meetings: Never    • Marital Status: Living with partner   Intimate Partner Violence: Not At Risk (9/14/2021)    Humiliation, Afraid, Rape, and Kick questionnaire    • Fear of Current or Ex-Partner: No    • Emotionally Abused: No    • Physically Abused: No    • Sexually Abused: No   Housing Stability: Low Risk  (5/11/2022)    Housing Stability Vital Sign    • Unable to Pay for Housing in the Last Year: No    • Number of [address] in the Last Year: 1    • Unstable Housing in the Last Year: No       {Common ambulatory SmartLinks:82180}    Vital Signs  There were no vitals filed for this visit. Physical Exam:  General appearance: alert, cooperative, no distress  HEENT: normocephalic, anicteric, no eye erythema or discharge, no oropharyngeal thrush  Neck: supple, trachea midline, no adenopathy  Lungs: CTA b/l, no rales, rhonchi, or wheezing, unlabored respirations  Heart: RRR, no murmur, rubs, or gallops  Abdomen: soft, non-tender, non-distended, normal bowel sounds, no masses or organomegaly  Rectal: deferred  Extremities: no cyanosis, clubbing, or edema  Musculoskeletal: normal gait  Skin: color and texture normal, no jaundice, no rashes or lesions  Psychiatric: alert and oriented, normal affect and behavior                                                                    Robert Colvin PA-C

## 2023-08-14 NOTE — PROGRESS NOTES
Assessment and Plan    #1. Decreased Appetite  - TSH 8/7/23 normal    #2. Unintentional Weight Loss  - get O&P x 3  - get CXR and CT Ab/Pel with IV and PO contrast    #3 GERD  - Continue omeprazole 20mg daily  - plan EGD    #4 Iron Deficiency Anemia  - continue oral iron supplementation  - PCP to follow labs and provide iron supplementation.  --------------------------------------------------------------------------------------------------------------------    Chief Complaint:  poor appetite, unintentional weight loss and GERD      HPI: Amaya Devlin is a 63 y.o. male with extensive PMH including DONALD on oral iron (recent CBC 8/7/23 with normal Hg, MCV 77, MCH 25.6, Iron 37, % sat 16, TIBC 239, Ferritin 37)), GERD (with history of hiatal hernia and schatzkis ring), DM(2), CKD (III)who presents today with poor appetite, unintentional weight loss and GERD, Bipolar, THERESE, panic attacks    Seen recently in the ER 7/31/23 for hallucinations. PCP follow up 8/2/23 with lightheadedness (MRI brain ordered) and Weight loss (25 pounds in past 6 months). Decreased appetite over the past month. Bowels constipated at times. No reports of vomiting blood, urinating blood, black or bloody stools. Years of GERD - 20  Current medications - Omeprazole 20mg daily (some break through at times)  Past medications - TUMS, alkaseltzer  Pain or difficulty swallowing - No  Weight loss - Yes 25 lbs in 6 months  Last esophageal imaging - none recent  Last EGD - does not remember having one      Patient denies any nausea, vomiting, abdominal pain, dysphagia, diarrhea, blood in stool, or black tarry stools. Patient's last colonoscopy was around 2018 and unrevealing per patient. Does not remember if previous EGD.     Review of Systems:   General: negative for fatigue, fever, night sweats or unexpected weight loss  Psychological: negative for anxiety or depression  Ophthalmic: negative for blurry vision or scleral icterus  ENT: negative for headaches, oral lesions, sore throat, vocal changes or dysphagia  Hematological and Lymphatic: negative for pallor or swollen lymph nodes  Respiratory: negative for cough, shortness of breath or wheezing  Cardiovascular: negative for chest pain, edema or murmur  Gastrointestinal: as mentioned in HPI  Genito-Urinary: negative for dysuria or incontinence  Musculoskeletal: negative for joint pain, joint stiffness or joint swelling  Dermatological: negative for pruritus, rash, or jaundice    Current Medications  Current Outpatient Medications   Medication Sig Dispense Refill   • atorvastatin (LIPITOR) 20 mg tablet Take 1 tablet (20 mg total) by mouth daily 90 tablet 3   • clonazePAM (KlonoPIN) 1 mg tablet TAKE 1 TABLET BY MOUTH EVERY DAY AS NEEDED FOR ANXIETY OR PANIC ATTACKS 30 tablet 3   • lurasidone (Latuda) 40 mg tablet Take 2 tabs po qd with dinner x 1 week, then 1 1/2 tabs po qd x 1 week, then 1 tab po qd x 1 week, then stop 32 tablet 0   • omeprazole (PriLOSEC) 20 mg delayed release capsule Take 1 capsule (20 mg total) by mouth daily 90 capsule 3   • polyethylene glycol (GOLYTELY) 4000 mL solution Take 4,000 mL by mouth once for 1 dose 1083 mL 0   • Trulicity 1.5 WM/6.6UO injection INJECT ONCE WEEKLY 6 mL 1   • ammonium lactate (LAC-HYDRIN) 12 % cream Apply topically as needed for dry skin (Patient not taking: Reported on 3/1/2023) 385 g 0   • ammonium lactate (LAC-HYDRIN) 12 % cream Apply topically as needed for dry skin (Patient not taking: Reported on 2/24/2023) 385 g 0   • ascorbic acid (VITAMIN C) 250 mg tablet Take 1 tablet (250 mg total) by mouth daily (Patient not taking: Reported on 11/16/2021) 90 tablet 3   • benztropine (COGENTIN) 2 mg tablet TAKE 1/2 TABLET BY MOUTH TWICE DAILY X 1 WEEK, THEN REDUCE TO 1 TAB DAILY 1 tablet 0   • Blood Glucose Monitoring Suppl (FREESTYLE LITE) ANTONIA by Does not apply route 3 (three) times a day (Patient not taking: Reported on 11/21/2022) 1 each 0   • Blood Glucose Monitoring Suppl (FreeStyle Lite) w/Device KIT USE AS INSTRUCTED 4 TIMES A DAY (Patient not taking: Reported on 2/24/2023) 1 kit 0   • buPROPion (WELLBUTRIN) 100 mg tablet Take 1 tablet (100 mg total) by mouth 2 (two) times a day Take bid--(once in the AM and once at 12 noon) 180 tablet 1   • busPIRone (BUSPAR) 30 MG tablet Take 1 tablet (30 mg total) by mouth 2 (two) times a day (Patient not taking: Reported on 8/14/2023) 180 tablet 1   • cholecalciferol (VITAMIN D3) 1,000 units tablet Take 1 tablet (1,000 Units total) by mouth in the morning.  (Patient not taking: Reported on 5/23/2022) 30 tablet 0   • Continuous Blood Gluc  (FreeStyle Clarence 14 Day [place]) ANTONIA Use  to check BG at least 4 times a day (Patient not taking: Reported on 2/21/2022) 1 each 0   • Continuous Blood Gluc Sensor (AlyotechStyle Clarence 14 Day Sensor) MISC Apply sensor every 14 days to check BG at least 4 times a day (Patient not taking: Reported on 2/21/2022) 2 each 5   • ferrous sulfate 324 (65 Fe) mg Take 1 tablet (324 mg total) by mouth every other day (Patient not taking: Reported on 8/14/2023) 45 tablet 2   • fluticasone (FLONASE) 50 mcg/act nasal spray 1 spray into each nostril daily (Patient not taking: Reported on 2/21/2022) 18.2 mL 1   • FREESTYLE LITE test strip CHECK BLOOD SUGARS FOUR TIMES DAILY (Patient not taking: Reported on 11/21/2022) 400 strip 3   • gabapentin (NEURONTIN) 300 mg capsule TAKE 1 CAPSULE(300 MG) BY MOUTH TWICE DAILY (Patient not taking: Reported on 7/31/2023) 180 capsule 3   • glucose monitoring kit (FREESTYLE) monitoring kit Use 1 each 4 (four) times a day Fine to substitute other brand if not covered by insurance (Patient not taking: Reported on 2/15/2023) 1 each 0   • Insulin Pen Needle 31G X 8 MM MISC Check sugars three times a day (Patient not taking: Reported on 8/14/2023) 100 each 1   • INSULIN SYRINGE .5CC/29G 29G X 1/2" 0.5 ML MISC by Does not apply route (Patient not taking: Reported on 8/14/2023)     • Lancets (FREESTYLE) lancets USE THREE TIMES DAILY AS DIRECTED (Patient not taking: Reported on 11/21/2022) 300 each 0   • lisinopril-hydrochlorothiazide (PRINZIDE,ZESTORETIC) 20-12.5 MG per tablet Take 1 tablet by mouth daily Do not start before June 1, 2023. (Patient not taking: Reported on 7/5/2023) 90 tablet 3   • OLANZapine (ZyPREXA) 2.5 mg tablet To start in 1 week: take 1 tab po qhs 30 tablet 0   • sodium chloride (OCEAN) 0.65 % nasal spray 1 spray into each nostril daily at bedtime (Patient not taking: Reported on 6/14/2022) 104 mL 2   • traZODone (DESYREL) 50 mg tablet Take 1/2 to 1 tab po qhs prn insomnia (Patient not taking: Reported on 8/14/2023) 90 tablet 1   • vitamin B-12 (CYANOCOBALAMIN) 500 MCG TABS Take 1 tablet (500 mcg total) by mouth daily (Patient not taking: Reported on 8/14/2023) 90 tablet 2     No current facility-administered medications for this visit. Past Medical History  Past Medical History:   Diagnosis Date   • ADHD, adult residual type    • Anxiety    • Anxiety    • Bipolar 1 disorder ([address])    • Cataract     Left eye   • Depression    • Depression    • Diabetes mellitus (HCC)     blood sugar 167 on admission   • Equinus deformity of foot    • GERD (gastroesophageal reflux disease)    • Homicidal ideations    • Hyperlipidemia    • Hypertension    • Microalbuminuria    • Morbid obesity ([address])    • Neuropathy    • Shortness of breath    • Sleep apnea     CPAP at bedtime   • Sleep apnea    • Stroke [facility])    • Suicidal intent        Past Surgical History  Past Surgical History:   Procedure Laterality Date   • CATARACT EXTRACTION     • CATARACT EXTRACTION     • HAND SURGERY Right    • OTHER SURGICAL HISTORY      REPAIR OF SUPERFICIAL WOUND FACE   • ME ESOPHAGOGASTRODUODENOSCOPY TRANSORAL DIAGNOSTIC N/A 6/14/2018    Procedure: ESOPHAGOGASTRODUODENOSCOPY (EGD); Surgeon: Salvador Jonas MD;  Location: [facility];   Service: Gastroenterology   • ME ESOPHAGOGASTRODUODENOSCOPY TRANSORAL DIAGNOSTIC N/A 2018    Procedure: EGD AND COLONOSCOPY;  Surgeon: Shari Flores MD;  Location: [facility]; Service: Gastroenterology       Past Social History   Social History     Socioeconomic History   • Marital status: /Civil Union     Spouse name: None   • Number of children: 1   • Years of education: None   • Highest education level: None   Occupational History   • Occupation: On disability   Tobacco Use   • Smoking status: Former     Types: Cigarettes     Quit date:      Years since quittin.6   • Smokeless tobacco: Former     Types: Chew   • Tobacco comments:     pt "Quit after approx over 25 years ago"   Vaping Use   • Vaping Use: Never used   Substance and Sexual Activity   • Alcohol use: Yes     Comment: rarely   • Drug use: Not Currently     Comment: quit 20 years ago   • Sexual activity: Yes     Partners: Female     Birth control/protection: None     Comment: steady girlfriend   Other Topics Concern   • None   Social History Narrative     from spouse, technically still  but living with other partner, partner's father, and early 2019, his partner's daughter and boyfriend moved in with their two children. Then the boyfriend moved out in 2019. Then partner's daughter moved out approx 2021. (Pt's partner has guardianship of the grandchildren per Pt). Children: 1 stepdaughter         Education:    Pt denies any hx of learning disabilities and reached childhood milestones on time as far as he knows. He wore braces for equinovarus but states he did not suffer delay in walking    Graduated High School --he was held back 3 times because "I was just lazy, didn't do the work."    No college    Took some core classes in [employer] and worked as a volunteer  from approx [phone]             Substance Abuse History:     Pt drinks ETOH approx q 3-4 months but denies any h/o ETOH abuse.     Pt tried smoking Crack once in the past and has been smoking THC once q 2-3 months since 14y/o. He denies other drug use, IVDA, addictions or drug abuse. Social Determinants of Health     Financial Resource Strain: Low Risk  (2/24/2023)    Overall Financial Resource Strain (CARDIA)    • Difficulty of Paying Living Expenses: Not hard at all   Food Insecurity: No Food Insecurity (2/24/2023)    Hunger Vital Sign    • Worried About Running Out of Food in the Last Year: Never true    • Ran Out of Food in the Last Year: Never true   Transportation Needs: No Transportation Needs (2/24/2023)    PRAPARE - Transportation    • Lack of Transportation (Medical): No    • Lack of Transportation (Non-Medical): No   Physical Activity: Inactive (9/14/2021)    Exercise Vital Sign    • Days of Exercise per Week: 0 days    • Minutes of Exercise per Session: 0 min   Stress: No Stress Concern Present (9/14/2021)    [facility]    • Feeling of Stress : Not at all   Social Connections:  Moderately Isolated (9/14/2021)    Social Connection and Isolation Panel [NHANES]    • Frequency of Communication with Friends and Family: More than three times a week    • Frequency of Social Gatherings with Friends and Family: Once a week    • Attends Baptism Services: Never    • Active Member of Clubs or Organizations: No    • Attends Club or Organization Meetings: Never    • Marital Status: Living with partner   Intimate Partner Violence: Not At Risk (9/14/2021)    Humiliation, Afraid, Rape, and Kick questionnaire    • Fear of Current or Ex-Partner: No    • Emotionally Abused: No    • Physically Abused: No    • Sexually Abused: No   Housing Stability: Low Risk  (5/11/2022)    Housing Stability Vital Sign    • Unable to Pay for Housing in the Last Year: No    • Number of [address] in the Last Year: 1    • Unstable Housing in the Last Year: No         Vital Signs  Vitals:    08/14/23 1012   BP: 158/90 BP Location: Left arm   Patient Position: Sitting   Cuff Size: Standard   Pulse: 83   SpO2: 98%   Weight: 120 kg (264 lb 9.6 oz)   Height: 5' 6" (1.676 m)       Physical Exam:  General appearance: alert, cooperative, no distress  HEENT: normocephalic, anicteric, no eye erythema or discharge, no oropharyngeal thrush  Neck: supple, trachea midline, no adenopathy  Lungs: CTA b/l, no rales, rhonchi, or wheezing, unlabored respirations  Heart: RRR, no murmur, rubs, or gallops  Abdomen: soft, non-tender, non-distended, normal bowel sounds, no masses or organomegaly  Rectal: deferred  Extremities: 1+ edema B/L LE, no cyanosis, clubbing  Musculoskeletal: normal gait  Skin: color and texture normal, no jaundice, no rashes or lesions  Psychiatric: alert and oriented, difficult time sitting still. Miller Matute PA-C

## 2023-08-16 ENCOUNTER — OFFICE VISIT (OUTPATIENT)
Dept: PSYCHIATRY | Facility: CLINIC | Age: 58
End: 2023-08-16
Payer: MEDICARE

## 2023-08-16 DIAGNOSIS — F31.5 BIPOLAR DISORDER, CURRENT EPISODE DEPRESSED, SEVERE, WITH PSYCHOTIC FEATURES (HCC): Primary | ICD-10-CM

## 2023-08-16 DIAGNOSIS — G47.00 INSOMNIA, UNSPECIFIED TYPE: ICD-10-CM

## 2023-08-16 DIAGNOSIS — G24.01 TARDIVE DYSKINESIA: ICD-10-CM

## 2023-08-16 DIAGNOSIS — F41.0 PANIC ATTACKS: ICD-10-CM

## 2023-08-16 DIAGNOSIS — F41.1 GENERALIZED ANXIETY DISORDER: Chronic | ICD-10-CM

## 2023-08-16 PROCEDURE — 99214 OFFICE O/P EST MOD 30 MIN: CPT | Performed by: PHYSICIAN ASSISTANT

## 2023-08-16 RX ORDER — OLANZAPINE 5 MG/1
5 TABLET ORAL
Qty: 30 TABLET | Refills: 0 | Status: SHIPPED | OUTPATIENT
Start: 2023-08-16

## 2023-08-16 RX ORDER — VALBENAZINE 40 MG/1
40 CAPSULE ORAL
Qty: 30 CAPSULE | Refills: 0 | Status: SHIPPED | OUTPATIENT
Start: 2023-08-16

## 2023-08-16 RX ORDER — LURASIDONE HYDROCHLORIDE 80 MG/1
TABLET, FILM COATED ORAL
Qty: 11 TABLET | Refills: 0 | Status: SHIPPED | OUTPATIENT
Start: 2023-08-16 | End: 2023-08-24 | Stop reason: ALTCHOICE

## 2023-08-16 NOTE — BH TREATMENT PLAN
TREATMENT PLAN (Medication Management Only)        [facility]    Name/Date of Birth/MRN:  Council Rubinstein 63 y.o. 1965 MRN: [id number]  Date of Treatment Plan: August 16, 2023  Diagnosis/Diagnoses:   1. Bipolar disorder, current episode depressed, severe, with psychotic features ([address])    2. Generalized anxiety disorder    3. Tardive dyskinesia    4. Panic attacks    5. Insomnia, unspecified type      Strengths/Personal Resources for Self-Care: "Friendly; Generous". Area/Areas of need (in own words): "Father in law". 1. Long Term Goal:   Maintain mood stability and control of anxiety and psychotic Sxs  Target Date: 3-6 months  Person/Persons responsible for completion of goal: Irais Parker and Ward BROWNW (counselor)  2. Short Term Objective (s) - How will we reach this goal?:   A. Provider new recommended medication/dosage changes and/or continue medication(s): Pt is voicing no mood, psychotic, or anxiety complaints but tardive dyskinetic movements continue. Tx options discussed and he accepts to start Angelina Ramos for the SE. I will continue cross titration of his antipsychotics -- weaning off of Latuda and increasing Olanzapine. No change in other medicines    Treatment plan done and Pt accepts the plan.    Start Ingrezza 40mg (1) cap po qhs # 30  Increase Olanzapine to 5mg (1) tab po qhs # 30   Wean off of the Latuda 80mg (1) tab po qd with a meal x 1 week, then 1/2 tab po qd x 1 week then stop    Continue:   Clonazepam 1mg (1) tab po qd prn anxiety -- Pt give Rx with 3 refills on 6/7/2023  Continue the following of which Pt has enough from last Rxs:  Bupropion 100mg (1) tab po qAM and (1) tab q 12 noon   Buspirone 15mg (1/2-1) tab po bid   Trazodone 50mg (1/2-1) tab po qhs prn insomnia    Melatonin 1mg prn insomnia-- Pt gets OTC  Gabapentin 300mg (1) tab po bid for DM neuropathy--per PCP    Vit D and Fe with Vit C--per Nephrology    Get CBC with diff  Pt to have labs (CMP, CBC, Iron panel, Cystatin C with eGFR, Vit D, Urine Microalbumin/Creatinin ratio --per Nephrologist   Pt continues counseling with Keith Lester LCSW  Pt to f/u with Endocrinology 10/10/2023 as sched  Return 8/30/2023, then again 9/13/2023 as sched, AND make an appt for 9/27/2023, call sooner prn       Target Date: 3-6 months  Person/Persons Responsible for Completion of Goal: Anjum Tolentino and Nannette Mcguire   Progress Towards Goals: stable, continuing treatment  Treatment Modality: Medication mgt and counseling  Review due 180 days from date of this plan: 6 months - 2/16/2024  Expected length of service: ongoing treatment unless revised  My Physician/PA/NP and I have developed this plan together and I agree to work on the goals and objectives. I understand the treatment goals that were developed for my treatment.   Electronic Signatures: on file (unless signed below)    Antoine Fuentes PA-C 08/16/23

## 2023-08-18 ENCOUNTER — SOCIAL WORK (OUTPATIENT)
Dept: BEHAVIORAL/MENTAL HEALTH CLINIC | Facility: CLINIC | Age: 58
End: 2023-08-18
Payer: MEDICARE

## 2023-08-18 DIAGNOSIS — F31.76 BIPOLAR I DISORDER, MOST RECENT EPISODE DEPRESSED, IN FULL REMISSION (HCC): Primary | ICD-10-CM

## 2023-08-18 DIAGNOSIS — F41.0 PANIC ATTACKS: ICD-10-CM

## 2023-08-18 DIAGNOSIS — F41.1 GENERALIZED ANXIETY DISORDER: ICD-10-CM

## 2023-08-18 DIAGNOSIS — G47.00 INSOMNIA, UNSPECIFIED TYPE: ICD-10-CM

## 2023-08-18 PROCEDURE — 90834 PSYTX W PT 45 MINUTES: CPT | Performed by: SOCIAL WORKER

## 2023-08-18 NOTE — BH TREATMENT PLAN
Outpatient Behavioral Health Psychotherapy Treatment Plan                  Treatment Plan Tracking: The plan update was due on the 08/15. However he was last here 7/17/23. Today the 18th of august was the first time back since 7/17 so we did it today. Salome Mac  1965     Date of Initial Psychotherapy Assessment: 05/07/2018   Date of Current Treatment Plan: 08/18/23  Treatment Plan Target Date: 02/12/2023  Treatment Plan Expiration Date: 02/12/2023    Diagnosis:   1. Bipolar I disorder, most recent episode depressed, in full remission ([address])        2. Insomnia, unspecified type        3. Generalized anxiety disorder        4. Panic attacks            Area(s) of Need: Please see below    Long Term Goal 1 (in the client's own words): I still need to manage my depression and my anxiety. Stage of Change: Action    Target Date for completion: 02/12/2024     Anticipated therapeutic modalities: mindfulness and CBT     People identified to complete this goal: Myself with the help of my therapist.       Objective 1: (identify the means of measuring success in meeting the objective): My depression and anxiety will be at a minimum level.       Objective 2: (identify the means of measuring success in meeting the objective): n/a      Long Term Goal 2 (in the client's own words): n/a    Stage of Change: n/a  Target Date for completion: n/a     Anticipated therapeutic modalities: n/a     People identified to complete this goal: n/a      Objective 1: (identify the means of measuring success in meeting the objective): n/a      Objective 2: (identify the means of measuring success in meeting the objective): n/a     Long Term Goal 3 (in the client's own words): n/a    Stage of Change: n/a  Target Date for completion: n/a     Anticipated therapeutic modalities: n/a     People identified to complete this goal: n/a      Objective 1: (identify the means of measuring success in meeting the objective): n/a      Objective 2: (identify the means of measuring success in meeting the objective): n/a     I am currently under the care of a [facility] psychiatric provider: yes    My [facility] psychiatric provider is: Oj No    I am currently taking psychiatric medications: Yes, as prescribed    I feel that I will be ready for discharge from mental health care when I reach the following (measurable goal/objective): when my symptoms are at a minimum. For children and adults who have a legal guardian:   Has there been any change to custody orders and/or guardianship status? NA. If yes, attach updated documentation. I have created my Crisis Plan and have been offered a copy of this plan    [address]: Diagnosis and Treatment Plan explained to Eh\[facility] [place] [facility]\"" acknowledges an understanding of their diagnosis. Lizbeth Wells agrees to this treatment plan.     I have been offered a copy of this Treatment Plan. yes

## 2023-08-18 NOTE — PSYCH
Behavioral Health Psychotherapy Progress Note    Psychotherapy Provided: Individual Psychotherapy     1. Bipolar I disorder, most recent episode depressed, in full remission ([address])        2. Insomnia, unspecified type        3. Generalized anxiety disorder        4. Panic attacks            Goals addressed in session: Goal 1     DATA: He spoke about his partners health issues. He shared his niece finally met her 1/2 sisters and she is doing well living with Rhonda Rodriguez and his partner. Then 2 to 3 weeks ago he started hallucinating and could not ascertain where they were when he clearly was in an area in [place] he grew up in. When he returned they called here and he got in with [facility] and he is having his medications changed. He has not hallucinated since and he denies using any illicit drugs. He shared he is more emotional lately. However he claims he is getting along better with his elderly and difficult father in law. He shared he sold one of his rifles so it was not in the house. He denies however suicidal/homicidal ideation, plan or intent. I provided support and strategies to cope. During this session, this clinician used the following therapeutic modalities: Client-centered Therapy, Cognitive Behavioral Therapy, Mindfulness-based Strategies and Solution-Focused Therapy    Substance Abuse was not addressed during this session. If the client is diagnosed with a co-occurring substance use disorder, please indicate any changes in the frequency or amount of use: n/a. Stage of change for addressing substance use diagnoses: No substance use/Not applicable    ASSESSMENT:  Ghassan Philippe presents with a Anxious mood. his affect is anxious, which is congruent, with his mood and the content of the session. The client has made progress on their goals. Ghassan Philippe presents with a none risk of suicide, none risk of self-harm, and none risk of harm to others.     For any risk assessment that surpasses a "low" rating, a safety plan must be developed. A safety plan was indicated: no  If yes, describe in detail n/a    PLAN: Between sessions, Masha Cerda will use mindfulness and CBT. At the next session, the therapist will use Client-centered Therapy, Cognitive Behavioral Therapy, Mindfulness-based Strategies and Supportive Psychotherapy to address issues and symptoms as they may arise. .    Behavioral Health Treatment Plan and Discharge Planning: Masha Cerda is aware of and agrees to continue to work on their treatment plan. They have identified and are working toward their discharge goals.  yes    Visit start and stop times:    08/18/23  Start Time: 1310  Stop Time: 1400  Total Visit Time: 50 minutes

## 2023-08-22 ENCOUNTER — TELEPHONE (OUTPATIENT)
Dept: GASTROENTEROLOGY | Facility: CLINIC | Age: 58
End: 2023-08-22

## 2023-08-22 NOTE — TELEPHONE ENCOUNTER
Scheduled date of EGD/colonoscopy (as of today): 8/24/23  Physician performing EGD/colonoscopy: Dr. Jak Mariee  [facility] of EGD/colonoscopy[facility]   Desired bowel prep reviewed with patient: Golytely   Instructions reviewed with patient by: ls reviewed with pt  Clearances:  N/a  Diabetic    The Schedule opened sooner for Dr. Jak Mariee on 8/24/23. I called and spoke to pt and moved procedure date up. Reviewed instructions and informed pt he would be called tomorrow with his arrival time.

## 2023-08-23 ENCOUNTER — HOSPITAL ENCOUNTER (OUTPATIENT)
Dept: CT IMAGING | Facility: HOSPITAL | Age: 58
Discharge: HOME | End: 2023-08-23
Payer: MEDICARE

## 2023-08-23 DIAGNOSIS — R63.4 UNINTENDED WEIGHT LOSS: ICD-10-CM

## 2023-08-23 PROCEDURE — 74177 CT ABD & PELVIS W/CONTRAST: CPT

## 2023-08-23 PROCEDURE — G1004 CDSM NDSC: HCPCS

## 2023-08-23 RX ADMIN — IOHEXOL 100 ML: 350 INJECTION, SOLUTION INTRAVENOUS at 16:49

## 2023-08-24 ENCOUNTER — HOSPITAL ENCOUNTER (OUTPATIENT)
Dept: GASTROENTEROLOGY | Facility: HOSPITAL | Age: 58
Setting detail: OUTPATIENT SURGERY
Discharge: HOME | End: 2023-08-24
Payer: MEDICARE

## 2023-08-24 ENCOUNTER — ANESTHESIA EVENT (OUTPATIENT)
Dept: GASTROENTEROLOGY | Facility: HOSPITAL | Age: 58
End: 2023-08-24

## 2023-08-24 ENCOUNTER — ANESTHESIA (OUTPATIENT)
Dept: GASTROENTEROLOGY | Facility: HOSPITAL | Age: 58
End: 2023-08-24

## 2023-08-24 VITALS
HEIGHT: 67 IN | TEMPERATURE: 96.9 F | HEART RATE: 90 BPM | BODY MASS INDEX: 40.97 KG/M2 | DIASTOLIC BLOOD PRESSURE: 91 MMHG | RESPIRATION RATE: 16 BRPM | SYSTOLIC BLOOD PRESSURE: 190 MMHG | WEIGHT: 261 LBS | OXYGEN SATURATION: 100 %

## 2023-08-24 DIAGNOSIS — R63.4 UNINTENDED WEIGHT LOSS: ICD-10-CM

## 2023-08-24 LAB — GLUCOSE SERPL-MCNC: 124 MG/DL (ref 65–140)

## 2023-08-24 PROCEDURE — 82948 REAGENT STRIP/BLOOD GLUCOSE: CPT

## 2023-08-24 RX ORDER — PROPOFOL 10 MG/ML
INJECTION, EMULSION INTRAVENOUS CONTINUOUS PRN
Status: DISCONTINUED | OUTPATIENT
Start: 2023-08-24 | End: 2023-08-24

## 2023-08-24 RX ORDER — LIDOCAINE HCL/PF 100 MG/5ML
SYRINGE (ML) INJECTION AS NEEDED
Status: DISCONTINUED | OUTPATIENT
Start: 2023-08-24 | End: 2023-08-24

## 2023-08-24 RX ORDER — SODIUM CHLORIDE, SODIUM LACTATE, POTASSIUM CHLORIDE, CALCIUM CHLORIDE 600; 310; 30; 20 MG/100ML; MG/100ML; MG/100ML; MG/100ML
INJECTION, SOLUTION INTRAVENOUS CONTINUOUS PRN
Status: DISCONTINUED | OUTPATIENT
Start: 2023-08-24 | End: 2023-08-24

## 2023-08-24 RX ORDER — PROPOFOL 10 MG/ML
INJECTION, EMULSION INTRAVENOUS AS NEEDED
Status: DISCONTINUED | OUTPATIENT
Start: 2023-08-24 | End: 2023-08-24

## 2023-08-24 RX ADMIN — LIDOCAINE HYDROCHLORIDE 50 MG: 20 INJECTION, SOLUTION INTRAVENOUS at 07:32

## 2023-08-24 RX ADMIN — SODIUM CHLORIDE, SODIUM LACTATE, POTASSIUM CHLORIDE, AND CALCIUM CHLORIDE: .6; .31; .03; .02 INJECTION, SOLUTION INTRAVENOUS at 07:31

## 2023-08-24 RX ADMIN — PROPOFOL 110 MCG/KG/MIN: 10 INJECTION, EMULSION INTRAVENOUS at 07:32

## 2023-08-24 RX ADMIN — PROPOFOL 100 MG: 10 INJECTION, EMULSION INTRAVENOUS at 07:32

## 2023-08-24 NOTE — ANESTHESIA PREPROCEDURE EVALUATION
Procedure:  COLONOSCOPY  EGD    Relevant Problems   CARDIO   (+) Hyperlipidemia      ENDO   (+) Type 2 diabetes mellitus with stage 3 chronic kidney disease and hypertension (HCC)      GI/HEPATIC   (+) GERD (gastroesophageal reflux disease)   (+) Hiatal hernia      /RENAL   (+) Benign hypertension with CKD (chronic kidney disease) stage III (HCC)   (+) Stage 3 chronic kidney disease (HCC)      HEMATOLOGY   (+) Anemia, unspecified      MUSCULOSKELETAL   (+) Hiatal hernia      NEURO/PSYCH   (+) Diabetic polyneuropathy (HCC)   (+) Generalized anxiety disorder   (+) Panic attacks      PULMONARY   (+) DAISY (obstructive sleep apnea)        Physical Exam    Airway    Mallampati score: III  TM Distance: >3 FB  Neck ROM: full     Dental       Cardiovascular  Cardiovascular exam normal    Pulmonary  Pulmonary exam normal     Other Findings        #1. Decreased Appetite  - TSH 8/7/23 normal     #2. Unintentional Weight Loss  - get O&P x 3  - get CXR and CT Ab/Pel with IV and PO contrast     #3 GERD  - Continue omeprazole 20mg daily  - plan EGD     #4 Iron Deficiency Anemia  - continue oral iron supplementation  - PCP to follow labs and provide iron supplementation.  --------------------------------------------------------------------------------------------------------------------     Chief Complaint:  poor appetite, unintentional weight loss and GERD        HPI: Leander Jiang is a 63 y.o. male with extensive PMH including DONALD on oral iron (recent CBC 8/7/23 with normal Hg, MCV 77, MCH 25.6, Iron 37, % sat 16, TIBC 239, Ferritin 37)), GERD (with history of hiatal hernia and schatzkis ring), DM(2), CKD (III)who presents today with poor appetite, unintentional weight loss and GERD, Bipolar, THERESE, panic attacks     Seen recently in the ER 7/31/23 for hallucinations. PCP follow up 8/2/23 with lightheadedness (MRI brain ordered) and Weight loss (25 pounds in past 6 months). Decreased appetite over the past month.   Bowels constipated at times.     No reports of vomiting blood, urinating blood, black or bloody stools.     Years of GERD - 20  Current medications - Omeprazole 20mg daily (some break through at times)  Past medications - TUMS, alkaseltzer  Pain or difficulty swallowing - No  Weight loss - Yes 25 lbs in 6 months  Last esophageal imaging - none recent  Last EGD - does not remember having one    Anesthesia Plan  ASA Score- 3     Anesthesia Type- IV sedation with anesthesia with ASA Monitors. Additional Monitors:   Airway Plan:           Plan Factors-    Chart reviewed. EKG reviewed. Imaging results reviewed. Existing labs reviewed. Patient summary reviewed. Induction- intravenous. Postoperative Plan-     Informed Consent- Anesthetic plan and risks discussed with patient. I personally reviewed this patient with the CRNA. Discussed and agreed on the Anesthesia Plan with the CRNA. Glory Maradiaga

## 2023-08-24 NOTE — H&P
History and Physical - [facility]  Carley Garcia 63 y.o. male MRN: [id number]    HPI: Carley Garcia is a 63y.o. year old male who presents for evaluation of iron deficiency anemia, weight loss. Review of Systems    Historical Information   Past Medical History:   Diagnosis Date   • ADHD, adult residual type    • Anxiety    • Anxiety    • Bipolar 1 disorder ([address])    • Cataract     Left eye   • CPAP (continuous positive airway pressure) dependence    • Depression    • Depression    • Diabetes mellitus (HCC)     blood sugar 167 on admission   • Equinus deformity of foot    • GERD (gastroesophageal reflux disease)    • Homicidal ideations    • Hyperlipidemia    • Hypertension    • Microalbuminuria    • Morbid obesity ([address])    • Neuropathy    • Shortness of breath    • Sleep apnea     CPAP at bedtime   • Sleep apnea    • Stroke [facility])    • Suicidal intent      Past Surgical History:   Procedure Laterality Date   • CATARACT EXTRACTION     • CATARACT EXTRACTION     • COLONOSCOPY     • HAND SURGERY Right    • OTHER SURGICAL HISTORY      REPAIR OF SUPERFICIAL WOUND FACE   • [place] ESOPHAGOGASTRODUODENOSCOPY TRANSORAL DIAGNOSTIC N/A 2018    Procedure: ESOPHAGOGASTRODUODENOSCOPY (EGD); Surgeon: Ryan Barlow MD;  Location: [facility]; Service: Gastroenterology   • [place] ESOPHAGOGASTRODUODENOSCOPY TRANSORAL DIAGNOSTIC N/A 2018    Procedure: EGD AND COLONOSCOPY;  Surgeon: Ryan Barlow MD;  Location: [facility];   Service: Gastroenterology     Social History   Social History     Substance and Sexual Activity   Alcohol Use Yes    Comment: rarely     Social History     Substance and Sexual Activity   Drug Use Not Currently    Comment: quit 20 years ago     Social History     Tobacco Use   Smoking Status Former   • Types: Cigarettes   • Quit date:    • Years since quittin.6   Smokeless Tobacco Former   • Types: Chew   Tobacco Comments    pt "Quit after approx over 25 years ago" Family History   Problem Relation Age of Onset   • Diabetes Mother    • Alcohol abuse Father    • Diabetes Father    • Hypertension Father    • Lung cancer Father    • Stroke Father    • Cancer Father         lung   • Alcohol abuse Sister    • Depression Sister    • Lymphoma Sister    • Alcohol abuse Family    • Alcohol abuse Sister    • Alcohol abuse Sister    • Cancer Sister    • Heart disease Neg Hx    • Thyroid disease Neg Hx        Meds/Allergies     (Not in a hospital admission)      Allergies   Allergen Reactions   • Pollen Extract Nasal Congestion   • Tetanus Toxoid Swelling       Objective     /83   Pulse 94   Temp 97.9 °F (36.6 °C) (Temporal)   Resp 18   Ht 5' 7" (1.702 m)   Wt 118 kg (261 lb)   SpO2 99%   BMI 40.88 kg/m²       PHYSICAL EXAM    Gen: NAD  CV: RRR  CHEST: Clear  ABD: soft, NT/ND  EXT: no edema  Neuro: AAO      ASSESSMENT/PLAN:  This is a 63y.o. year old male here for evaluation of iron deficiency anemia. PLAN:   Procedure: EGD and colonoscopy.

## 2023-08-24 NOTE — ANESTHESIA POSTPROCEDURE EVALUATION
Post-Op Assessment Note    CV Status:  Stable  Pain Score: 0    Pain management: adequate     Mental Status:  Alert and awake   Hydration Status:  Euvolemic   PONV Controlled:  Controlled   Airway Patency:  Patent      Post Op Vitals Reviewed: Yes      Staff: CRNA         No notable events documented.     BP     Temp     Pulse     Resp      SpO2
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
## 2023-08-30 ENCOUNTER — OFFICE VISIT (OUTPATIENT)
Dept: PSYCHIATRY | Facility: CLINIC | Age: 58
End: 2023-08-30
Payer: MEDICARE

## 2023-08-30 DIAGNOSIS — G24.01 TARDIVE DYSKINESIA: ICD-10-CM

## 2023-08-30 DIAGNOSIS — F41.1 GENERALIZED ANXIETY DISORDER: ICD-10-CM

## 2023-08-30 DIAGNOSIS — F31.5 BIPOLAR DISORDER, CURRENT EPISODE DEPRESSED, SEVERE, WITH PSYCHOTIC FEATURES (HCC): Primary | ICD-10-CM

## 2023-08-30 DIAGNOSIS — G47.00 INSOMNIA, UNSPECIFIED TYPE: ICD-10-CM

## 2023-08-30 DIAGNOSIS — F41.0 PANIC ATTACKS: ICD-10-CM

## 2023-08-30 PROCEDURE — 99214 OFFICE O/P EST MOD 30 MIN: CPT | Performed by: PHYSICIAN ASSISTANT

## 2023-08-30 RX ORDER — OLANZAPINE 10 MG/1
10 TABLET ORAL
Qty: 30 TABLET | Refills: 0 | Status: SHIPPED | OUTPATIENT
Start: 2023-08-30

## 2023-09-01 ENCOUNTER — TELEPHONE (OUTPATIENT)
Dept: PSYCHIATRY | Facility: CLINIC | Age: 58
End: 2023-09-01

## 2023-09-01 DIAGNOSIS — G24.01 TARDIVE DYSKINESIA: ICD-10-CM

## 2023-09-01 RX ORDER — VALBENAZINE 40 MG/1
CAPSULE ORAL
Qty: 30 CAPSULE | Refills: 0 | Status: SHIPPED | OUTPATIENT
Start: 2023-09-01

## 2023-09-01 NOTE — TELEPHONE ENCOUNTER
Renewal of Valbendazole request for 40mg (1) cap po qhs # 30 -- to [place] at [address] [place] [address], [phone] instead

## 2023-09-05 NOTE — TELEPHONE ENCOUNTER
[employer] and [employer]. Completed PA for Ingrezza 40 MG CAPS via [employer] [facility] and uploaded office notes. Info sent to [employer] for review.

## 2023-09-08 NOTE — TELEPHONE ENCOUNTER
Received fax from [employer] denying Ingrezza 40 mg. Denial reasons: Ingrezza capsule is denied because it is not on your plan's Drug List (formulary). Medication authorization requires the following: (1) You need to try this covered drug: Austedo*; OR (2) your doctor needs to give us specific medical reasons why the covered drug is not appropriate for you. Please see denial letter scanned into media.
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
## 2023-09-13 ENCOUNTER — OFFICE VISIT (OUTPATIENT)
Dept: PSYCHIATRY | Facility: CLINIC | Age: 58
End: 2023-09-13
Payer: MEDICARE

## 2023-09-13 VITALS — WEIGHT: 276.3 LBS | HEIGHT: 67 IN | BODY MASS INDEX: 43.37 KG/M2

## 2023-09-13 DIAGNOSIS — G24.01 TARDIVE DYSKINESIA: ICD-10-CM

## 2023-09-13 DIAGNOSIS — F41.1 GENERALIZED ANXIETY DISORDER: ICD-10-CM

## 2023-09-13 DIAGNOSIS — F31.5 BIPOLAR I DISORDER, SEVERE, CURRENT OR MOST RECENT EPISODE DEPRESSED, WITH PSYCHOTIC FEATURES (HCC): Primary | ICD-10-CM

## 2023-09-13 DIAGNOSIS — G47.00 INSOMNIA, UNSPECIFIED TYPE: ICD-10-CM

## 2023-09-13 DIAGNOSIS — F41.0 PANIC ATTACKS: ICD-10-CM

## 2023-09-13 PROCEDURE — 99214 OFFICE O/P EST MOD 30 MIN: CPT | Performed by: PHYSICIAN ASSISTANT

## 2023-09-13 RX ORDER — VALBENAZINE 40 MG/1
CAPSULE ORAL
Qty: 90 CAPSULE | Refills: 0 | Status: SHIPPED | OUTPATIENT
Start: 2023-09-13

## 2023-09-18 ENCOUNTER — SOCIAL WORK (OUTPATIENT)
Dept: BEHAVIORAL/MENTAL HEALTH CLINIC | Facility: CLINIC | Age: 58
End: 2023-09-18
Payer: MEDICARE

## 2023-09-18 DIAGNOSIS — F31.76 BIPOLAR I DISORDER, MOST RECENT EPISODE DEPRESSED, IN FULL REMISSION (HCC): Primary | ICD-10-CM

## 2023-09-18 DIAGNOSIS — F41.1 GENERALIZED ANXIETY DISORDER: ICD-10-CM

## 2023-09-18 DIAGNOSIS — F41.0 PANIC ATTACKS: ICD-10-CM

## 2023-09-18 DIAGNOSIS — G47.00 INSOMNIA, UNSPECIFIED TYPE: ICD-10-CM

## 2023-09-18 PROCEDURE — 90834 PSYTX W PT 45 MINUTES: CPT | Performed by: SOCIAL WORKER

## 2023-09-18 NOTE — PSYCH
Behavioral Health Psychotherapy Progress Note    Psychotherapy Provided: Individual Psychotherapy     1. Bipolar I disorder, most recent episode depressed, in full remission ([address])        2. Panic attacks        3. Insomnia, unspecified type        4. Generalized anxiety disorder            Goals addressed in session: Goal 1     DATA: Rehana Evans shared he is doing better with the recent changes in his medications. He discussed how they are trying to save to move out of his partner's father's house. Rehana Evans feels his depression and his anxiety are better controlled. He discussed some of the issues they are encountering with his as he calls him his father in law. They have received complaints from the social club the elderly man is a member of and they are realizing he has the beginning of dementia. I provided support and strategies to cope. During this session, this clinician used the following therapeutic modalities: Client-centered Therapy, Cognitive Behavioral Therapy, Mindfulness-based Strategies and Supportive Psychotherapy    Substance Abuse was not addressed during this session. If the client is diagnosed with a co-occurring substance use disorder, please indicate any changes in the frequency or amount of use: n/a. Stage of change for addressing substance use diagnoses: No substance use/Not applicable    ASSESSMENT:  Kelly Block presents with a Euthymic/ normal mood. his affect is Normal range and intensity, which is congruent, with his mood and the content of the session. The client has made progress on their goals. Kelly Block presents with a none risk of suicide, none risk of self-harm, and none risk of harm to others. For any risk assessment that surpasses a "low" rating, a safety plan must be developed. A safety plan was indicated: no  If yes, describe in detail n/a    PLAN: Between sessions, Kelly Block will use mindfulness and CBT.  At the next session, the therapist will use Client-centered Therapy, Cognitive Behavioral Therapy, Mindfulness-based Strategies and Supportive Psychotherapy to address issues . Behavioral Health Treatment Plan and Discharge Planning: Crystal Contreras is aware of and agrees to continue to work on their treatment plan. They have identified and are working toward their discharge goals.  yes    Visit start and stop times:    09/18/23  Start Time: 1510  Stop Time: 1600  Total Visit Time: 50 minutes
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
## 2023-09-27 ENCOUNTER — OFFICE VISIT (OUTPATIENT)
Dept: PSYCHIATRY | Facility: CLINIC | Age: 58
End: 2023-09-27
Payer: MEDICARE

## 2023-09-27 DIAGNOSIS — G24.01 TARDIVE DYSKINESIA: ICD-10-CM

## 2023-09-27 DIAGNOSIS — F41.1 GENERALIZED ANXIETY DISORDER: ICD-10-CM

## 2023-09-27 DIAGNOSIS — G47.00 INSOMNIA, UNSPECIFIED TYPE: ICD-10-CM

## 2023-09-27 DIAGNOSIS — F31.5 BIPOLAR DISORDER, CURRENT EPISODE DEPRESSED, SEVERE, WITH PSYCHOTIC FEATURES (HCC): Primary | ICD-10-CM

## 2023-09-27 DIAGNOSIS — F41.0 PANIC ATTACKS: ICD-10-CM

## 2023-09-27 PROCEDURE — 99214 OFFICE O/P EST MOD 30 MIN: CPT | Performed by: PHYSICIAN ASSISTANT

## 2023-09-27 RX ORDER — VALBENAZINE 40 MG/1
40 CAPSULE ORAL 2 TIMES DAILY
Qty: 180 CAPSULE | Refills: 0 | Status: SHIPPED | OUTPATIENT
Start: 2023-09-27

## 2023-09-27 RX ORDER — OLANZAPINE 10 MG/1
10 TABLET ORAL
Qty: 90 TABLET | Refills: 0 | Status: SHIPPED | OUTPATIENT
Start: 2023-09-27

## 2023-09-29 ENCOUNTER — TELEPHONE (OUTPATIENT)
Dept: PSYCHIATRY | Facility: CLINIC | Age: 58
End: 2023-09-29

## 2023-09-29 NOTE — TELEPHONE ENCOUNTER
Writer rec a call from [employer] @ [phone] regarding needing a prior auth for [email or web address] (Ingrezza) 40 MG CAPS [

## 2023-10-04 ENCOUNTER — TELEPHONE (OUTPATIENT)
Dept: PSYCHIATRY | Facility: CLINIC | Age: 58
End: 2023-10-04

## 2023-10-04 NOTE — TELEPHONE ENCOUNTER
Spoke with the pharmacist at [facility] [place]. A prior authorization is needed for BID dosing of Ingezza 40 mg (prescribed 9/27/23). They will start the PA if clinical notes are sent to them.      Insurance information per pharmacist:    ID [id number]  [address] [place] [id number]  [address] [id number]  [id number]  Phone [phone]

## 2023-10-04 NOTE — TELEPHONE ENCOUNTER
[employer] called and LM stating they will need Bc Mast' office note faxed to them for the [address].  Their fax number is [phone]        [place] [address]

## 2023-10-04 NOTE — TELEPHONE ENCOUNTER
I contacted Siobhan Cruz via his cell phone # of record and informed him that he should come in to sign an BECKA so we can forward notes to the [employer] for his Juliana Muster for the 40mg bid dosing Rx. Pt stated he will come in tomorrow to do so.

## 2023-10-05 NOTE — TELEPHONE ENCOUNTER
Patient was in office today and signed the BECKA for provider notes that required for the prior auth. BECKA is scan in the chart.

## 2023-10-05 NOTE — TELEPHONE ENCOUNTER
Called [facility], requested fax number so office note can be faxed to them.     Tech advised to fax to: [phone] Attention: Brandon De Paz     FAXED

## 2023-10-06 NOTE — TELEPHONE ENCOUNTER
Received fax from [address] [place] [address], [id number],  In order for them to submit PA for Ingrezza they require a signature form provider.   Form placed in provider mail box

## 2023-10-09 ENCOUNTER — APPOINTMENT (OUTPATIENT)
Dept: LAB | Facility: CLINIC | Age: 58
End: 2023-10-09
Payer: MEDICARE

## 2023-10-09 DIAGNOSIS — N18.30 TYPE 2 DIABETES MELLITUS WITH STAGE 3 CHRONIC KIDNEY DISEASE AND HYPERTENSION (HCC): ICD-10-CM

## 2023-10-09 DIAGNOSIS — E11.22 TYPE 2 DIABETES MELLITUS WITH STAGE 3 CHRONIC KIDNEY DISEASE AND HYPERTENSION (HCC): ICD-10-CM

## 2023-10-09 DIAGNOSIS — R94.6 THYROID FUNCTION TEST ABNORMAL: ICD-10-CM

## 2023-10-09 DIAGNOSIS — I12.9 TYPE 2 DIABETES MELLITUS WITH STAGE 3 CHRONIC KIDNEY DISEASE AND HYPERTENSION (HCC): ICD-10-CM

## 2023-10-09 LAB
T4 FREE SERPL-MCNC: 0.77 NG/DL (ref 0.61–1.12)
TSH SERPL DL<=0.05 MIU/L-ACNC: 3.9 UIU/ML (ref 0.45–4.5)

## 2023-10-09 PROCEDURE — 84439 ASSAY OF FREE THYROXINE: CPT

## 2023-10-09 PROCEDURE — 83036 HEMOGLOBIN GLYCOSYLATED A1C: CPT

## 2023-10-09 PROCEDURE — 84443 ASSAY THYROID STIM HORMONE: CPT

## 2023-10-09 NOTE — TELEPHONE ENCOUNTER
Received fax from [address] [place] [address] Drive, [address], Ingrezza 40 mg capsule has been approved until 12/31/24. Approval letter scanned  Into media.

## 2023-10-10 ENCOUNTER — OFFICE VISIT (OUTPATIENT)
Dept: MULTI SPECIALTY CLINIC | Facility: CLINIC | Age: 58
End: 2023-10-10

## 2023-10-10 VITALS
DIASTOLIC BLOOD PRESSURE: 84 MMHG | HEART RATE: 108 BPM | TEMPERATURE: 98.1 F | SYSTOLIC BLOOD PRESSURE: 137 MMHG | HEIGHT: 66 IN | BODY MASS INDEX: 42.59 KG/M2 | WEIGHT: 265 LBS

## 2023-10-10 DIAGNOSIS — N18.30 TYPE 2 DIABETES MELLITUS WITH STAGE 3 CHRONIC KIDNEY DISEASE AND HYPERTENSION (HCC): Primary | ICD-10-CM

## 2023-10-10 DIAGNOSIS — K59.00 CONSTIPATION, UNSPECIFIED CONSTIPATION TYPE: ICD-10-CM

## 2023-10-10 DIAGNOSIS — I12.9 TYPE 2 DIABETES MELLITUS WITH STAGE 3 CHRONIC KIDNEY DISEASE AND HYPERTENSION (HCC): Primary | ICD-10-CM

## 2023-10-10 DIAGNOSIS — E11.22 TYPE 2 DIABETES MELLITUS WITH STAGE 3 CHRONIC KIDNEY DISEASE AND HYPERTENSION (HCC): Primary | ICD-10-CM

## 2023-10-10 LAB
EST. AVERAGE GLUCOSE BLD GHB EST-MCNC: 140 MG/DL
HBA1C MFR BLD: 6.5 %

## 2023-10-10 PROCEDURE — 99214 OFFICE O/P EST MOD 30 MIN: CPT | Performed by: PHYSICIAN ASSISTANT

## 2023-10-10 RX ORDER — DOCUSATE SODIUM 250 MG
250 CAPSULE ORAL DAILY
Qty: 30 CAPSULE | Refills: 5 | Status: SHIPPED | OUTPATIENT
Start: 2023-10-10

## 2023-10-10 NOTE — PATIENT INSTRUCTIONS
Continue with Trulicity 1.5 mg weekly. Monitor diet and maintain physical activity. Check blood sugar daily. Contact the office with any concerns or questions. Follow up in 6 months with lab work prior to visit.

## 2023-10-10 NOTE — PROGRESS NOTES
10/10/2023    Assessment/Plan      Diagnoses and all orders for this visit:    Type 2 diabetes mellitus with stage 3 chronic kidney disease and hypertension ([address])  -     Hemoglobin A1C; Future  -     Comprehensive metabolic panel; Future  -     Lipid panel; Future    Constipation, unspecified constipation type  -     docusate sodium (COLACE) 250 MG capsule; Take 1 capsule (250 mg total) by mouth daily        Assessment/Plan:  1. Type 2 diabetes: Had lab work completed yesterday, but A1c has yet to be resulted. Previous A1c was 5.7. Glucose logs present at today's office visit. At this time he will continue with Trulicity 1.5 mg weekly. If A1c does increase, we could increase the Trulicity. Has been discussed at previous office visit due to his microalbuminuria, SGLT2 inhibitor may also be beneficial in the future if needed. At this time no adjustments will be made. Strongly encouraged him to monitor glucose levels on a daily basis. Continue with lifestyle modifications to help improve glucose. Contact the office with any concerns or questions. Follow-up in 6 months with lab work completed prior to visit. 2.  Microalbuminuria: Currently following up with nephrology. As noted above SGLT2 inhibitor may be beneficial in the future. 3.  Hyperlipidemia: Previous lipid panel was excellent. However this was completed over a year ago. Continue with atorvastatin at this time. Repeat lipid panel prior to next office visit. CC: Diabetes Consult    History of Present Illness     HPI: Florinda Garcia is a 63y.o. year old male with type 2 diabetes for many years. He is on oral agents at home and takes Trulicity 1.5 mg weekly. Does have some issues with constipation, but is unclear if this is due to the Trulicity. Was previously on insulin, but made significant lifestyle modifications and has been doing well on Trulicity. Does have an upcoming appointment with gastroenterology.   He denies any polyuria, polydipsia, nocturia. Does have blurry vision, and is currently following up with ophthalmology. States that his eyesight has improved recently. He denies neuropathy, heart attack, stroke, and claudication but does admit to nephropathy and retinopathy. Overall doing well today without any concerns or questions. Continues to make lifestyle modifications to help improve glucose levels. Hypoglycemic episodes: No.     The patient's last eye exam was completed within the last year. The patient's last foot exam was in April 2023. Blood Sugar/Glucometer/Pump/CGM review: Since A1c has been doing extremely well, has not been checking glucose levels at all. Review of Systems   Constitutional:  Negative for activity change, appetite change, fatigue and unexpected weight change. HENT:  Negative for trouble swallowing. Eyes:  Positive for visual disturbance. Respiratory:  Negative for chest tightness and shortness of breath. Cardiovascular:  Negative for chest pain, palpitations and leg swelling. Gastrointestinal:  Positive for constipation. Negative for abdominal pain, diarrhea, nausea and vomiting. Endocrine: Negative for cold intolerance, heat intolerance, polydipsia, polyphagia and polyuria. Genitourinary:  Negative for frequency. Skin:  Negative for rash and wound. Neurological:  Negative for dizziness, weakness, light-headedness, numbness and headaches. Psychiatric/Behavioral:  Negative for dysphoric mood and sleep disturbance. The patient is not nervous/anxious.         Historical Information   Past Medical History:   Diagnosis Date   • ADHD, adult residual type    • Anxiety    • Anxiety    • Bipolar 1 disorder ([address])    • Cataract     Left eye   • CPAP (continuous positive airway pressure) dependence    • Depression    • Depression    • Diabetes mellitus (HCC)     blood sugar 167 on admission   • Equinus deformity of foot    • GERD (gastroesophageal reflux disease)    • Homicidal ideations    • Hyperlipidemia    • Hypertension    • Microalbuminuria    • Morbid obesity ([address])    • Neuropathy    • Shortness of breath    • Sleep apnea     CPAP at bedtime   • Sleep apnea    • Stroke [facility])    • Suicidal intent      Past Surgical History:   Procedure Laterality Date   • CATARACT EXTRACTION     • CATARACT EXTRACTION     • COLONOSCOPY     • HAND SURGERY Right    • OTHER SURGICAL HISTORY      REPAIR OF SUPERFICIAL WOUND FACE   • MA ESOPHAGOGASTRODUODENOSCOPY TRANSORAL DIAGNOSTIC N/A 2018    Procedure: ESOPHAGOGASTRODUODENOSCOPY (EGD); Surgeon: Opal Harris MD;  Location: [facility]; Service: Gastroenterology   • MA ESOPHAGOGASTRODUODENOSCOPY TRANSORAL DIAGNOSTIC N/A 2018    Procedure: EGD AND COLONOSCOPY;  Surgeon: Opal Harris MD;  Location: [facility];   Service: Gastroenterology     Social History   Social History     Substance and Sexual Activity   Alcohol Use Yes    Comment: rarely     Social History     Substance and Sexual Activity   Drug Use Not Currently    Comment: quit 20 years ago     Social History     Tobacco Use   Smoking Status Former   • Types: Cigarettes   • Quit date:    • Years since quittin.7   Smokeless Tobacco Former   • Types: Chew   Tobacco Comments    pt "Quit after approx over 25 years ago"     Family History:   Family History   Problem Relation Age of Onset   • Diabetes Mother    • Alcohol abuse Father    • Diabetes Father    • Hypertension Father    • Lung cancer Father    • Stroke Father    • Cancer Father         lung   • Alcohol abuse Sister    • Depression Sister    • Lymphoma Sister    • Alcohol abuse Family    • Alcohol abuse Sister    • Alcohol abuse Sister    • Cancer Sister    • Heart disease Neg Hx    • Thyroid disease Neg Hx        Meds/Allergies   Current Outpatient Medications   Medication Sig Dispense Refill   • ammonium lactate (LAC-HYDRIN) 12 % cream Apply topically as needed for dry skin 385 g 0   • buPROPion (WELLBUTRIN) 100 mg tablet Take 1 tablet (100 mg total) by mouth 2 (two) times a day Take bid--(once in the AM and once at 12 noon) 180 tablet 1   • docusate sodium (COLACE) 250 MG capsule Take 1 capsule (250 mg total) by mouth daily 30 capsule 5   • omeprazole (PriLOSEC) 20 mg delayed release capsule Take 1 capsule (20 mg total) by mouth daily 90 capsule 3   • traZODone (DESYREL) 50 mg tablet Take 1/2 to 1 tab po qhs prn insomnia 90 tablet 1   • Trulicity 1.5 QA/9.1NV injection INJECT ONCE WEEKLY 6 mL 1   • Valbenazine Tosylate (Ingrezza) 40 MG CAPS Take 40 mg by mouth 2 (two) times a day Take 1 cap po qhs 180 capsule 0   • ammonium lactate (LAC-HYDRIN) 12 % cream Apply topically as needed for dry skin (Patient not taking: Reported on 2/24/2023) 385 g 0   • atorvastatin (LIPITOR) 20 mg tablet Take 1 tablet (20 mg total) by mouth daily 90 tablet 3   • Blood Glucose Monitoring Suppl (FREESTYLE LITE) ANTONIA by Does not apply route 3 (three) times a day (Patient not taking: Reported on 11/21/2022) 1 each 0   • Blood Glucose Monitoring Suppl (FreeStyle Lite) w/Device KIT USE AS INSTRUCTED 4 TIMES A DAY (Patient not taking: Reported on 2/24/2023) 1 kit 0   • clonazePAM (KlonoPIN) 1 mg tablet TAKE 1 TABLET BY MOUTH EVERY DAY AS NEEDED FOR ANXIETY OR PANIC ATTACKS (Patient not taking: Reported on 10/10/2023) 30 tablet 3   • Continuous Blood Gluc  (FreeStyle [place]rence 14 Day [place]) ANTONIA Use  to check BG at least 4 times a day (Patient not taking: Reported on 2/21/2022) 1 each 0   • Continuous Blood Gluc Sensor (FreeStyle Clarence 14 Day Sensor) MISC Apply sensor every 14 days to check BG at least 4 times a day (Patient not taking: Reported on 2/21/2022) 2 each 5   • FREESTYLE LITE test strip CHECK BLOOD SUGARS FOUR TIMES DAILY (Patient not taking: Reported on 11/21/2022) 400 strip 3   • glucose monitoring kit (FREESTYLE) monitoring kit Use 1 each 4 (four) times a day Fine to substitute other brand if not covered by insurance (Patient not taking: Reported on 2/15/2023) 1 each 0   • Insulin Pen Needle 31G X 8 MM MISC Check sugars three times a day (Patient not taking: Reported on 8/14/2023) 100 each 1   • INSULIN SYRINGE .5CC/29G 29G X 1/2" 0.5 ML MISC by Does not apply route (Patient not taking: Reported on 8/14/2023)     • Lancets (FREESTYLE) lancets USE THREE TIMES DAILY AS DIRECTED (Patient not taking: Reported on 11/21/2022) 300 each 0   • OLANZapine (ZyPREXA) 10 mg tablet Take 1 tablet (10 mg total) by mouth daily at bedtime 90 tablet 0   • polyethylene glycol (GOLYTELY) 4000 mL solution Take 4,000 mL by mouth once for 1 dose 4000 mL 0     No current facility-administered medications for this visit. Allergies   Allergen Reactions   • Pollen Extract Nasal Congestion   • Tetanus Toxoid Swelling       Objective   Vitals: Blood pressure 137/84, pulse (!) 108, temperature 98.1 °F (36.7 °C), temperature source Temporal, height 5' 6" (1.676 m), weight 120 kg (265 lb). Invasive Devices     None                 Physical Exam  Vitals and nursing note reviewed. Constitutional:       General: He is not in acute distress. Appearance: Normal appearance. He is not diaphoretic. HENT:      Head: Normocephalic and atraumatic. Eyes:      General: No scleral icterus. Extraocular Movements: Extraocular movements intact. Conjunctiva/sclera: Conjunctivae normal.      Pupils: Pupils are equal, round, and reactive to light. Cardiovascular:      Rate and Rhythm: Normal rate and regular rhythm. Heart sounds: No murmur heard. Pulmonary:      Effort: Pulmonary effort is normal. No respiratory distress. Breath sounds: Normal breath sounds. No wheezing. Musculoskeletal:      Cervical back: Normal range of motion. Right lower leg: No edema. Left lower leg: No edema. Lymphadenopathy:      Cervical: No cervical adenopathy. Skin:     General: Skin is warm and dry.    Neurological:      Mental Status: He is alert and oriented to person, place, and time. Mental status is at baseline. Sensory: No sensory deficit. Gait: Gait normal.   Psychiatric:         Mood and Affect: Mood normal.         Behavior: Behavior normal.         Thought Content: Thought content normal.         The history was obtained from the review of the chart and from the patient. Lab Results:    Most recent Alc is  Lab Results   Component Value Date    HGBA1C 5.7 07/05/2023           No components found for: "HA1C"  No components found for: "GLU"    Lab Results   Component Value Date    CREATININE 1.43 (H) 07/31/2023    CREATININE 1.71 (H) 02/23/2023    CREATININE 1.71 (H) 09/13/2022    BUN 7 07/31/2023     12/15/2015    K 3.7 07/31/2023     07/31/2023    CO2 29 07/31/2023     eGFR   Date Value Ref Range Status   07/31/2023 53 ml/min/1.73sq m Final     No components found for: "MALBCRER"    Lab Results   Component Value Date    CHOL 104 09/09/2014    HDL 37 (L) 04/06/2022    TRIG 57 04/06/2022       Lab Results   Component Value Date    ALT 6 (L) 07/31/2023    AST 12 (L) 07/31/2023    ALKPHOS 100 07/31/2023    BILITOT 0.28 12/15/2015       Lab Results   Component Value Date    FREET4 0.77 10/09/2023             Portions of the record may have been created with voice recognition software. Occasional wrong word or "sound a like" substitutions may have occurred due to the inherent limitations of voice recognition software. Read the chart carefully and recognize, using context, where substitutions have occurred.

## 2023-10-11 ENCOUNTER — OFFICE VISIT (OUTPATIENT)
Dept: PSYCHIATRY | Facility: CLINIC | Age: 58
End: 2023-10-11
Payer: MEDICARE

## 2023-10-11 VITALS — BODY MASS INDEX: 42.62 KG/M2 | HEIGHT: 66 IN | WEIGHT: 265.2 LBS

## 2023-10-11 DIAGNOSIS — F41.0 PANIC ATTACKS: ICD-10-CM

## 2023-10-11 PROCEDURE — 99214 OFFICE O/P EST MOD 30 MIN: CPT | Performed by: PHYSICIAN ASSISTANT

## 2023-10-11 RX ORDER — CLONAZEPAM 1 MG/1
TABLET ORAL
Qty: 30 TABLET | Refills: 5 | Status: SHIPPED | OUTPATIENT
Start: 2023-10-11
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
## 2023-10-12 ENCOUNTER — TELEPHONE (OUTPATIENT)
Dept: GASTROENTEROLOGY | Facility: AMBULARY SURGERY CENTER | Age: 58
End: 2023-10-12

## 2023-10-12 ENCOUNTER — OFFICE VISIT (OUTPATIENT)
Dept: GASTROENTEROLOGY | Facility: AMBULARY SURGERY CENTER | Age: 58
End: 2023-10-12
Payer: MEDICARE

## 2023-10-12 VITALS
DIASTOLIC BLOOD PRESSURE: 70 MMHG | OXYGEN SATURATION: 98 % | WEIGHT: 264.4 LBS | SYSTOLIC BLOOD PRESSURE: 134 MMHG | BODY MASS INDEX: 41.5 KG/M2 | HEART RATE: 98 BPM | HEIGHT: 67 IN

## 2023-10-12 DIAGNOSIS — K21.9 GASTROESOPHAGEAL REFLUX DISEASE, UNSPECIFIED WHETHER ESOPHAGITIS PRESENT: Primary | ICD-10-CM

## 2023-10-12 DIAGNOSIS — R63.4 UNINTENDED WEIGHT LOSS: ICD-10-CM

## 2023-10-12 PROCEDURE — 99213 OFFICE O/P EST LOW 20 MIN: CPT | Performed by: PHYSICIAN ASSISTANT

## 2023-10-12 RX ORDER — BENZTROPINE MESYLATE 2 MG/1
2 TABLET ORAL 2 TIMES DAILY
COMMUNITY
Start: 2023-09-07

## 2023-10-12 NOTE — PATIENT INSTRUCTIONS
Scheduled date of EGD/colonoscopy (as of today): 12/22/23  Physician performing EGD/colonoscopy: Dr. Worth Klinefelter  Location of EGD/colonoscopy:  [address] [place] [address]  Desired bowel prep reviewed with patient: Miralax/Dulcolax  Instructions reviewed with patient by: Adalid Neri  Clearances:  N/A

## 2023-10-12 NOTE — PROGRESS NOTES
Assessment and Plan    #1. Decreased Appetite  - TSH 8/7/23 normal    #2. Unintentional Weight Loss  - get O&P x 3  - get CXR  -CT August 23, 2023 unrevealing    #3 GERD  - Continue omeprazole 20mg daily  -EGD on August 24 aborted due to food bolus in stomach likely second to patient's Trulicity. Will reattempt EGD with patient off Trulicity for 7 days. #4 Iron Deficiency Anemia  - continue oral iron supplementation  - PCP to follow labs and provide iron supplementation.  -Plan repeat colonoscopy once patient is off Trulicity for 7 days  --------------------------------------------------------------------------------------------------------------------    Chief Complaint:  poor appetite, unintentional weight loss and GERD      HPI: Betina Ames is a 63 y.o. male with extensive PMH including DONALD on oral iron (recent CBC 8/7/23 with normal Hg, MCV 77, MCH 25.6, Iron 37, % sat 16, TIBC 239, Ferritin 37)), GERD (with history of hiatal hernia and schatzkis ring), DM(2), CKD (III)who presents today with poor appetite, unintentional weight loss and GERD, Bipolar, THERESE, panic attacks    Seen recently in the ER 7/31/23 for hallucinations. PCP follow up 8/2/23 with lightheadedness (MRI brain ordered) and Weight loss (25 pounds in past 6 months). Decreased appetite over the past month. Bowels constipated at times. No reports of vomiting blood, urinating blood, black or bloody stools. Years of GERD - 20  Current medications - Omeprazole 20mg daily (some break through at times)  Past medications - TUMS, alkaseltzer  Pain or difficulty swallowing - No  Weight loss - Yes 25 lbs in 6 months  Last esophageal imaging - none recent  Last EGD - does not remember having one      Patient denies any nausea, vomiting, abdominal pain, dysphagia, diarrhea, blood in stool, or black tarry stools.      Interval history: Patient had CAT scan done on August 23, 2023 which was unremarkable other than noting some fatty liver and a very small adrenal nodule. Nothing to suggest patient's weight loss. Chest x-ray was not done. Stool O&P x3 not done. EGD was attempted on August 24, 2023 however was abandoned due to large food bolus in the stomach. This is likely due to the patient taking Trulicity. Colonoscopy was not attempted due to patient having large amount of food in his stomach and concern for aspiration. Weight is currently stable 264 to 265 pounds. Patient denies any abdominal pain, nausea/vomiting, pain or difficulty swallowing, change in bowels, black or bloody stools      Endoscopic History  EGD attempted 8/24/23 but large food bolus noted in stomach. Likely secondary to patient being on Trulicity leading to gastroparesis. Patient's last colonoscopy was around 2018 and unrevealing per patient. Does not remember if previous EGD.     Review of Systems:   General: negative for fatigue, fever, night sweats or unexpected weight loss  Psychological: negative for anxiety or depression  Ophthalmic: negative for blurry vision or scleral icterus  ENT: negative for headaches, oral lesions, sore throat, vocal changes or dysphagia  Hematological and Lymphatic: negative for pallor or swollen lymph nodes  Respiratory: negative for cough, shortness of breath or wheezing  Cardiovascular: negative for chest pain, edema or murmur  Gastrointestinal: as mentioned in HPI  Genito-Urinary: negative for dysuria or incontinence  Musculoskeletal: negative for joint pain, joint stiffness or joint swelling  Dermatological: negative for pruritus, rash, or jaundice    Current Medications  Current Outpatient Medications   Medication Sig Dispense Refill   • ammonium lactate (LAC-HYDRIN) 12 % cream Apply topically as needed for dry skin 385 g 0   • atorvastatin (LIPITOR) 20 mg tablet Take 1 tablet (20 mg total) by mouth daily 90 tablet 3   • benztropine (COGENTIN) 2 mg tablet Take 2 mg by mouth 2 (two) times a day     • Blood Glucose Monitoring Suppl (FREESTYLE LITE) ANTONIA by Does not apply route 3 (three) times a day 1 each 0   • Blood Glucose Monitoring Suppl (FreeStyle Lite) w/Device KIT USE AS INSTRUCTED 4 TIMES A DAY 1 kit 0   • buPROPion (WELLBUTRIN) 100 mg tablet Take 1 tablet (100 mg total) by mouth 2 (two) times a day Take bid--(once in the AM and once at 12 noon) 180 tablet 1   • clonazePAM (KlonoPIN) 1 mg tablet TAKE 1 TABLET BY MOUTH EVERY DAY AS NEEDED FOR ANXIETY OR PANIC ATTACKS 30 tablet 5   • Continuous Blood Gluc  (FreeStyle [place]rence 14 Day [place]) ANTONIA Use  to check BG at least 4 times a day 1 each 0   • Continuous Blood Gluc Sensor (FreeStyle [place]rence 14 Day Sensor) MISC Apply sensor every 14 days to check BG at least 4 times a day 2 each 5   • docusate sodium (COLACE) 250 MG capsule Take 1 capsule (250 mg total) by mouth daily 30 capsule 5   • FREESTYLE LITE test strip CHECK BLOOD SUGARS FOUR TIMES DAILY 400 strip 3   • glucose monitoring kit (FREESTYLE) monitoring kit Use 1 each 4 (four) times a day Fine to substitute other brand if not covered by insurance 1 each 0   • Insulin Pen Needle 31G X 8 MM MISC Check sugars three times a day 100 each 1   • INSULIN SYRINGE .5CC/29G 29G X 1/2" 0.5 ML MISC Use     • Lancets (FREESTYLE) lancets USE THREE TIMES DAILY AS DIRECTED 300 each 0   • OLANZapine (ZyPREXA) 10 mg tablet Take 1 tablet (10 mg total) by mouth daily at bedtime 90 tablet 0   • omeprazole (PriLOSEC) 20 mg delayed release capsule Take 1 capsule (20 mg total) by mouth daily 90 capsule 3   • traZODone (DESYREL) 50 mg tablet Take 1/2 to 1 tab po qhs prn insomnia 90 tablet 1   • Trulicity 1.5 ZR/1.6PD injection INJECT ONCE WEEKLY 6 mL 1   • Valbenazine Tosylate (Ingrezza) 40 MG CAPS Take 40 mg by mouth 2 (two) times a day Take 1 cap po qhs 180 capsule 0   • ammonium lactate (LAC-HYDRIN) 12 % cream Apply topically as needed for dry skin (Patient not taking: Reported on 2/24/2023) 385 g 0   • polyethylene glycol (GOLYTELY) 4000 mL solution Take 4,000 mL by mouth once for 1 dose 4000 mL 0     No current facility-administered medications for this visit. Past Medical History  Past Medical History:   Diagnosis Date   • ADHD, adult residual type    • Anxiety    • Anxiety    • Bipolar 1 disorder ([address])    • Cataract     Left eye   • CPAP (continuous positive airway pressure) dependence    • Depression    • Depression    • Diabetes mellitus (HCC)     blood sugar 167 on admission   • Equinus deformity of foot    • GERD (gastroesophageal reflux disease)    • Homicidal ideations    • Hyperlipidemia    • Hypertension    • Microalbuminuria    • Morbid obesity ([address])    • Neuropathy    • Shortness of breath    • Sleep apnea     CPAP at bedtime   • Sleep apnea    • Stroke [facility])    • Suicidal intent        Past Surgical History  Past Surgical History:   Procedure Laterality Date   • CATARACT EXTRACTION     • CATARACT EXTRACTION     • COLONOSCOPY     • HAND SURGERY Right    • OTHER SURGICAL HISTORY      REPAIR OF SUPERFICIAL WOUND FACE   • [place] ESOPHAGOGASTRODUODENOSCOPY TRANSORAL DIAGNOSTIC N/A 2018    Procedure: ESOPHAGOGASTRODUODENOSCOPY (EGD); Surgeon: Suyapa Cavanaugh MD;  Location: [facility]; Service: Gastroenterology   • [place] ESOPHAGOGASTRODUODENOSCOPY TRANSORAL DIAGNOSTIC N/A 2018    Procedure: EGD AND COLONOSCOPY;  Surgeon: Suyapa Cavanaugh MD;  Location: [facility];   Service: Gastroenterology       Past Social History   Social History     Socioeconomic History   • Marital status: /Civil Union     Spouse name: None   • Number of children: 1   • Years of education: None   • Highest education level: None   Occupational History   • Occupation: On disability   Tobacco Use   • Smoking status: Former     Types: Cigarettes     Quit date:      Years since quittin.8   • Smokeless tobacco: Former     Types: Chew   • Tobacco comments:     pt "Quit after approx over 25 years ago"   Vaping Use   • Vaping Use: Never used   Substance and Sexual Activity   • Alcohol use: Yes     Comment: rarely   • Drug use: Not Currently     Comment: quit 20 years ago   • Sexual activity: Yes     Partners: Female     Birth control/protection: None     Comment: steady girlfriend   Other Topics Concern   • None   Social History Narrative     from spouse, technically still  but living with other partner, partner's father, and early 9/2019, his partner's daughter and boyfriend moved in with their two children. Then the boyfriend moved out in 9/2019. Then partner's daughter moved out approx 7/2021. (Pt's partner has guardianship of the grandchildren per Pt). Children: 1 stepdaughter         Education:    Pt denies any hx of learning disabilities and reached childhood milestones on time as far as he knows. He wore braces for equinovarus but states he did not suffer delay in walking    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 because "I was just lazy, didn't do the work."    No college    Took some core classes in [employer] and worked as a volunteer  from approx [id number]             Substance Abuse History:     Pt drinks ETOH approx q 3-4 months but denies any h/o ETOH abuse. Pt tried smoking Crack once in the past and has been smoking THC once q 2-3 months since 14y/o. He denies other drug use, IVDA, addictions or drug abuse. Social Determinants of Health     Financial Resource Strain: Low Risk  (2/24/2023)    Overall Financial Resource Strain (CARDIA)    • Difficulty of Paying Living Expenses: Not hard at all   Food Insecurity: No Food Insecurity (2/24/2023)    Hunger Vital Sign    • Worried About Running Out of Food in the Last Year: Never true    • Ran Out of Food in the Last Year: Never true   Transportation Needs: No Transportation Needs (2/24/2023)    PRAPARE - Transportation    • Lack of Transportation (Medical): No    • Lack of Transportation (Non-Medical):  No   Physical Activity: Inactive (9/14/2021)    Exercise Vital Sign    • Days of Exercise per Week: 0 days    • Minutes of Exercise per Session: 0 min   Stress: No Stress Concern Present (9/14/2021)    [address] [facility]    • Feeling of Stress : Not at all   Social Connections:  Moderately Isolated (9/14/2021)    Social Connection and Isolation Panel [NHANES]    • Frequency of Communication with Friends and Family: More than three times a week    • Frequency of Social Gatherings with Friends and Family: Once a week    • Attends [employer] Services: Never    • Active Member of Clubs or Organizations: No    • Attends Club or Organization Meetings: Never    • Marital Status: Living with partner   Intimate Partner Violence: Not At Risk (9/14/2021)    Humiliation, Afraid, Rape, and Kick questionnaire    • Fear of Current or Ex-Partner: No    • Emotionally Abused: No    • Physically Abused: No    • Sexually Abused: No   Housing Stability: Low Risk  (5/11/2022)    Housing Stability Vital Sign    • Unable to Pay for Housing in the Last Year: No    • Number of [address] in the Last Year: 1    • Unstable Housing in the Last Year: No         Vital Signs  Vitals:    10/12/23 1336   BP: 134/70   BP Location: Right arm   Patient Position: Sitting   Cuff Size: Standard   Pulse: 98   SpO2: 98%   Weight: 120 kg (264 lb 6.4 oz)   Height: 5' 7" (1.702 m)         Physical Exam:  General appearance: alert, cooperative, no distress  HEENT: normocephalic, anicteric, no eye erythema or discharge, no oropharyngeal thrush  Neck: supple, trachea midline, no adenopathy  Lungs: CTA b/l, no rales, rhonchi, or wheezing, unlabored respirations  Heart: RRR, no murmur, rubs, or gallops  Abdomen: soft, non-tender, non-distended, normal bowel sounds, no masses or organomegaly  Rectal: deferred  Extremities: 1+ edema B/L LE, no cyanosis, clubbing  Musculoskeletal: normal gait  Skin: color and texture normal, no jaundice, no rashes or lesions  Psychiatric: alert and oriented, difficult time sitting still. Jose Alberto Lee PA-C

## 2023-10-12 NOTE — LETTER
October 12, 2023     Blake Beckham, [address] [place] [address] [facility]    Patient: Carley Garcia   YOB: 1965   Date of Visit: 10/12/2023       Dear Dr. Sara Rider: Thank you for referring Carlos Moore to me for evaluation. Below are my notes for this consultation. If you have questions, please do not hesitate to call me. I look forward to following your patient along with you. Sincerely,        Agnes Black PA-C        CC: No Recipients    Agnes Black PA-C  10/12/2023  1:47 PM  Incomplete  Assessment and Plan    #1. Decreased Appetite  - TSH 8/7/23 normal    #2. Unintentional Weight Loss  - get O&P x 3  - get CXR  -CT August 23, 2023 unrevealing    #3 GERD  - Continue omeprazole 20mg daily  -EGD on August 24 aborted due to food bolus in stomach likely second to patient's Trulicity. Will reattempt EGD with patient off Trulicity for 7 days. #4 Iron Deficiency Anemia  - continue oral iron supplementation  - PCP to follow labs and provide iron supplementation.  -Plan repeat colonoscopy once patient is off Trulicity for 7 days  --------------------------------------------------------------------------------------------------------------------    Chief Complaint:  poor appetite, unintentional weight loss and GERD      HPI: Carley Garcia is a 63 y.o. male with extensive PMH including DONALD on oral iron (recent CBC 8/7/23 with normal Hg, MCV 77, MCH 25.6, Iron 37, % sat 16, TIBC 239, Ferritin 37)), GERD (with history of hiatal hernia and schatzkis ring), DM(2), CKD (III)who presents today with poor appetite, unintentional weight loss and GERD, Bipolar, THERESE, panic attacks    Seen recently in the ER 7/31/23 for hallucinations. PCP follow up 8/2/23 with lightheadedness (MRI brain ordered) and Weight loss (25 pounds in past 6 months). Decreased appetite over the past month. Bowels constipated at times.     No reports of vomiting blood, urinating blood, black or bloody stools. Years of GERD - 20  Current medications - Omeprazole 20mg daily (some break through at times)  Past medications - TUMS, alkaseltzer  Pain or difficulty swallowing - No  Weight loss - Yes 25 lbs in 6 months  Last esophageal imaging - none recent  Last EGD - does not remember having one      Patient denies any nausea, vomiting, abdominal pain, dysphagia, diarrhea, blood in stool, or black tarry stools. Interval history: Patient had CAT scan done on August 23, 2023 which was unremarkable other than noting some fatty liver and a very small adrenal nodule. Nothing to suggest patient's weight loss. Chest x-ray was not done. Stool O&P x3 not done. EGD was attempted on August 24, 2023 however was abandoned due to large food bolus in the stomach. This is likely due to the patient taking Trulicity. Colonoscopy was not attempted due to patient having large amount of food in his stomach and concern for aspiration. Weight is currently stable 264 to 265 pounds. Patient denies any abdominal pain, nausea/vomiting, pain or difficulty swallowing, change in bowels, black or bloody stools      Endoscopic History  EGD attempted 8/24/23 but large food bolus noted in stomach. Likely secondary to patient being on Trulicity leading to gastroparesis. Patient's last colonoscopy was around 2018 and unrevealing per patient. Does not remember if previous EGD.     Review of Systems:   General: negative for fatigue, fever, night sweats or unexpected weight loss  Psychological: negative for anxiety or depression  Ophthalmic: negative for blurry vision or scleral icterus  ENT: negative for headaches, oral lesions, sore throat, vocal changes or dysphagia  Hematological and Lymphatic: negative for pallor or swollen lymph nodes  Respiratory: negative for cough, shortness of breath or wheezing  Cardiovascular: negative for chest pain, edema or murmur  Gastrointestinal: as mentioned in HPI  Genito-Urinary: negative for dysuria or incontinence  Musculoskeletal: negative for joint pain, joint stiffness or joint swelling  Dermatological: negative for pruritus, rash, or jaundice    Current Medications  Current Outpatient Medications   Medication Sig Dispense Refill   • ammonium lactate (LAC-HYDRIN) 12 % cream Apply topically as needed for dry skin 385 g 0   • atorvastatin (LIPITOR) 20 mg tablet Take 1 tablet (20 mg total) by mouth daily 90 tablet 3   • benztropine (COGENTIN) 2 mg tablet Take 2 mg by mouth 2 (two) times a day     • Blood Glucose Monitoring Suppl (FREESTYLE LITE) ANTONIA by Does not apply route 3 (three) times a day 1 each 0   • Blood Glucose Monitoring Suppl (FreeStyle Lite) w/Device KIT USE AS INSTRUCTED 4 TIMES A DAY 1 kit 0   • buPROPion (WELLBUTRIN) 100 mg tablet Take 1 tablet (100 mg total) by mouth 2 (two) times a day Take bid--(once in the AM and once at 12 noon) 180 tablet 1   • clonazePAM (KlonoPIN) 1 mg tablet TAKE 1 TABLET BY MOUTH EVERY DAY AS NEEDED FOR ANXIETY OR PANIC ATTACKS 30 tablet 5   • Continuous Blood Gluc  (FreeStyle Clarence 14 Day Cannon) ANTONIA Use  to check BG at least 4 times a day 1 each 0   • Continuous Blood Gluc Sensor (FreeStyle Clarence 14 Day Sensor) MISC Apply sensor every 14 days to check BG at least 4 times a day 2 each 5   • docusate sodium (COLACE) 250 MG capsule Take 1 capsule (250 mg total) by mouth daily 30 capsule 5   • FREESTYLE LITE test strip CHECK BLOOD SUGARS FOUR TIMES DAILY 400 strip 3   • glucose monitoring kit (FREESTYLE) monitoring kit Use 1 each 4 (four) times a day Fine to substitute other brand if not covered by insurance 1 each 0   • Insulin Pen Needle 31G X 8 MM MISC Check sugars three times a day 100 each 1   • INSULIN SYRINGE .5CC/29G 29G X 1/2" 0.5 ML MISC Use     • Lancets (FREESTYLE) lancets USE THREE TIMES DAILY AS DIRECTED 300 each 0   • OLANZapine (ZyPREXA) 10 mg tablet Take 1 tablet (10 mg total) by mouth daily at bedtime 90 tablet 0   • omeprazole (PriLOSEC) 20 mg delayed release capsule Take 1 capsule (20 mg total) by mouth daily 90 capsule 3   • traZODone (DESYREL) 50 mg tablet Take 1/2 to 1 tab po qhs prn insomnia 90 tablet 1   • Trulicity 1.5 WI/7.5HK injection INJECT ONCE WEEKLY 6 mL 1   • Valbenazine Tosylate (Ingrezza) 40 MG CAPS Take 40 mg by mouth 2 (two) times a day Take 1 cap po qhs 180 capsule 0   • ammonium lactate (LAC-HYDRIN) 12 % cream Apply topically as needed for dry skin (Patient not taking: Reported on 2/24/2023) 385 g 0   • polyethylene glycol (GOLYTELY) 4000 mL solution Take 4,000 mL by mouth once for 1 dose 4000 mL 0     No current facility-administered medications for this visit. Past Medical History  Past Medical History:   Diagnosis Date   • ADHD, adult residual type    • Anxiety    • Anxiety    • Bipolar 1 disorder ([address])    • Cataract     Left eye   • CPAP (continuous positive airway pressure) dependence    • Depression    • Depression    • Diabetes mellitus (HCC)     blood sugar 167 on admission   • Equinus deformity of foot    • GERD (gastroesophageal reflux disease)    • Homicidal ideations    • Hyperlipidemia    • Hypertension    • Microalbuminuria    • Morbid obesity ([address])    • Neuropathy    • Shortness of breath    • Sleep apnea     CPAP at bedtime   • Sleep apnea    • Stroke [facility])    • Suicidal intent        Past Surgical History  Past Surgical History:   Procedure Laterality Date   • CATARACT EXTRACTION     • CATARACT EXTRACTION     • COLONOSCOPY     • HAND SURGERY Right    • OTHER SURGICAL HISTORY      REPAIR OF SUPERFICIAL WOUND FACE   • MI ESOPHAGOGASTRODUODENOSCOPY TRANSORAL DIAGNOSTIC N/A 06/14/2018    Procedure: ESOPHAGOGASTRODUODENOSCOPY (EGD); Surgeon: Suyapa Cavanaugh MD;  [facility]; Service: Gastroenterology   • MI ESOPHAGOGASTRODUODENOSCOPY TRANSORAL DIAGNOSTIC N/A 09/12/2018    Procedure: EGD AND COLONOSCOPY;  Surgeon: Suyapa Cavanaugh MD;  [facility];   Service: Gastroenterology       Past Social History   Social History     Socioeconomic History   • Marital status: /Civil Union     Spouse name: None   • Number of children: 1   • Years of education: None   • Highest education level: None   Occupational History   • Occupation: On disability   Tobacco Use   • Smoking status: Former     Types: Cigarettes     Quit date:      Years since quittin.8   • Smokeless tobacco: Former     Types: Chew   • Tobacco comments:     pt "Quit after approx over 25 years ago"   Vaping Use   • Vaping Use: Never used   Substance and Sexual Activity   • Alcohol use: Yes     Comment: rarely   • Drug use: Not Currently     Comment: quit 20 years ago   • Sexual activity: Yes     Partners: Female     Birth control/protection: None     Comment: steady girlfriend   Other Topics Concern   • None   Social History Narrative     from spouse, technically still  but living with other partner, partner's father, and early 2019, his partner's daughter and boyfriend moved in with their two children. Then the boyfriend moved out in 2019. Then partner's daughter moved out approx 2021. (Pt's partner has guardianship of the grandchildren per Pt). Children: 1 stepdaughter         Education:    Pt denies any hx of learning disabilities and reached childhood milestones on time as far as he knows. He wore braces for equinovarus but states he did not suffer delay in walking    Graduated High School --he was held back 3 times because "I was just lazy, didn't do the work."    No college    Took some core classes in [employer] and worked as a volunteer  from approx [phone]             Substance Abuse History:     Pt drinks ETOH approx q 3-4 months but denies any h/o ETOH abuse. Pt tried smoking Crack once in the past and has been smoking THC once q 2-3 months since 14y/o. He denies other drug use, IVDA, addictions or drug abuse.          Social Determinants of Health     Financial Resource Strain: Low Risk  (2/24/2023)    Overall Financial Resource Strain (CARDIA)    • Difficulty of Paying Living Expenses: Not hard at all   Food Insecurity: No Food Insecurity (2/24/2023)    Hunger Vital Sign    • Worried About Running Out of Food in the Last Year: Never true    • Ran Out of Food in the Last Year: Never true   Transportation Needs: No Transportation Needs (2/24/2023)    PRAPARE - Transportation    • Lack of Transportation (Medical): No    • Lack of Transportation (Non-Medical): No   Physical Activity: Inactive (9/14/2021)    Exercise Vital Sign    • Days of Exercise per Week: 0 days    • Minutes of Exercise per Session: 0 min   Stress: No Stress Concern Present (9/14/2021)    [facility]    • Feeling of Stress : Not at all   Social Connections:  Moderately Isolated (9/14/2021)    Social Connection and Isolation Panel [NHANES]    • Frequency of Communication with Friends and Family: More than three times a week    • Frequency of Social Gatherings with Friends and Family: Once a week    • Attends Mormon Services: Never    • Active Member of Clubs or Organizations: No    • Attends Club or Organization Meetings: Never    • Marital Status: Living with partner   Intimate Partner Violence: Not At Risk (9/14/2021)    Humiliation, Afraid, Rape, and Kick questionnaire    • Fear of Current or Ex-Partner: No    • Emotionally Abused: No    • Physically Abused: No    • Sexually Abused: No   Housing Stability: [address]  (5/11/2022)    Housing Stability Vital Sign    • Unable to Pay for Housing in the Last Year: No    • Number of [address] in the Last Year: 1    • Unstable Housing in the Last Year: No         Vital Signs  Vitals:    10/12/23 1336   BP: 134/70   BP Location: Right arm   Patient Position: Sitting   Cuff Size: Standard   Pulse: 98   SpO2: 98%   Weight: 120 kg (264 lb 6.4 oz)   Height: 5' 7" (1.702 m)         Physical Exam:  General appearance: alert, cooperative, no distress  HEENT: normocephalic, anicteric, no eye erythema or discharge, no oropharyngeal thrush  Neck: supple, trachea midline, no adenopathy  Lungs: CTA b/l, no rales, rhonchi, or wheezing, unlabored respirations  Heart: RRR, no murmur, rubs, or gallops  Abdomen: soft, non-tender, non-distended, normal bowel sounds, no masses or organomegaly  Rectal: deferred  Extremities: 1+ edema B/L LE, no cyanosis, clubbing  Musculoskeletal: normal gait  Skin: color and texture normal, no jaundice, no rashes or lesions  Psychiatric: alert and oriented, difficult time sitting still. Marly Guzman PA-C  10/12/2023  1:42 PM  Sign when Signing Visit  Assessment and Plan    #1. Decreased Appetite  - TSH 8/7/23 normal    #2. Unintentional Weight Loss  - get O&P x 3  - get CXR  -CT August 23, 2023 unrevealing    #3 GERD  - Continue omeprazole 20mg daily  -EGD on August 24 aborted due to food bolus in stomach likely second to patient's Trulicity. Will reattempt EGD with patient off Trulicity for 7 days. #4 Iron Deficiency Anemia  - continue oral iron supplementation  - PCP to follow labs and provide iron supplementation.  -Plan repeat colonoscopy once patient is off Trulicity for 7 days  --------------------------------------------------------------------------------------------------------------------    Chief Complaint:  poor appetite, unintentional weight loss and GERD      HPI: Kelly Block is a 63 y.o. male with extensive PMH including DONALD on oral iron (recent CBC 8/7/23 with normal Hg, MCV 77, MCH 25.6, Iron 37, % sat 16, TIBC 239, Ferritin 37)), GERD (with history of hiatal hernia and schatzkis ring), DM(2), CKD (III)who presents today with poor appetite, unintentional weight loss and GERD, Bipolar, THERESE, panic attacks    Seen recently in the ER 7/31/23 for hallucinations.    PCP follow up 8/2/23 with lightheadedness (MRI brain ordered) and Weight loss (25 pounds in past 6 months). Decreased appetite over the past month. Bowels constipated at times. No reports of vomiting blood, urinating blood, black or bloody stools. Years of GERD - 20  Current medications - Omeprazole 20mg daily (some break through at times)  Past medications - TUMS, alkaseltzer  Pain or difficulty swallowing - No  Weight loss - Yes 25 lbs in 6 months  Last esophageal imaging - none recent  Last EGD - does not remember having one      Patient denies any nausea, vomiting, abdominal pain, dysphagia, diarrhea, blood in stool, or black tarry stools. Interval history: Patient had CAT scan done on August 23, 2023 which was unremarkable other than noting some fatty liver and a very small adrenal nodule. Nothing to suggest patient's weight loss. Chest x-ray was not done. Stool O&P x3 not done. EGD was attempted on August 24, 2023 however was abandoned due to large food bolus in the stomach. This is likely due to the patient taking Trulicity. Colonoscopy was not attempted due to patient having large amount of food in his stomach and concern for aspiration. Endoscopic History  EGD attempted 8/24/23 but large food bolus noted in stomach. Likely secondary to patient being on Trulicity leading to gastroparesis. Patient's last colonoscopy was around 2018 and unrevealing per patient. Does not remember if previous EGD.     Review of Systems:   General: negative for fatigue, fever, night sweats or unexpected weight loss  Psychological: negative for anxiety or depression  Ophthalmic: negative for blurry vision or scleral icterus  ENT: negative for headaches, oral lesions, sore throat, vocal changes or dysphagia  Hematological and Lymphatic: negative for pallor or swollen lymph nodes  Respiratory: negative for cough, shortness of breath or wheezing  Cardiovascular: negative for chest pain, edema or murmur  Gastrointestinal: as mentioned in HPI  Genito-Urinary: negative for dysuria or incontinence  Musculoskeletal: negative for joint pain, joint stiffness or joint swelling  Dermatological: negative for pruritus, rash, or jaundice    Current Medications  Current Outpatient Medications   Medication Sig Dispense Refill   • ammonium lactate (LAC-HYDRIN) 12 % cream Apply topically as needed for dry skin 385 g 0   • atorvastatin (LIPITOR) 20 mg tablet Take 1 tablet (20 mg total) by mouth daily 90 tablet 3   • benztropine (COGENTIN) 2 mg tablet Take 2 mg by mouth 2 (two) times a day     • Blood Glucose Monitoring Suppl (FREESTYLE LITE) ANTONIA by Does not apply route 3 (three) times a day 1 each 0   • Blood Glucose Monitoring Suppl (FreeStyle Lite) w/Device KIT USE AS INSTRUCTED 4 TIMES A DAY 1 kit 0   • buPROPion (WELLBUTRIN) 100 mg tablet Take 1 tablet (100 mg total) by mouth 2 (two) times a day Take bid--(once in the AM and once at 12 noon) 180 tablet 1   • clonazePAM (KlonoPIN) 1 mg tablet TAKE 1 TABLET BY MOUTH EVERY DAY AS NEEDED FOR ANXIETY OR PANIC ATTACKS 30 tablet 5   • Continuous Blood Gluc  (FreeStyle Clarence 14 Day [place]illo) ANTONIA Use  to check BG at least 4 times a day 1 each 0   • Continuous Blood Gluc Sensor (FreeStyle Clarence 14 Day Sensor) MISC Apply sensor every 14 days to check BG at least 4 times a day 2 each 5   • docusate sodium (COLACE) 250 MG capsule Take 1 capsule (250 mg total) by mouth daily 30 capsule 5   • FREESTYLE LITE test strip CHECK BLOOD SUGARS FOUR TIMES DAILY 400 strip 3   • glucose monitoring kit (FREESTYLE) monitoring kit Use 1 each 4 (four) times a day Fine to substitute other brand if not covered by insurance 1 each 0   • Insulin Pen Needle 31G X 8 MM MISC Check sugars three times a day 100 each 1   • INSULIN SYRINGE .5CC/29G 29G X 1/2" 0.5 ML MISC Use     • Lancets (FREESTYLE) lancets USE THREE TIMES DAILY AS DIRECTED 300 each 0   • OLANZapine (ZyPREXA) 10 mg tablet Take 1 tablet (10 mg total) by mouth daily at bedtime 90 tablet 0   • omeprazole (PriLOSEC) 20 mg delayed release capsule Take 1 capsule (20 mg total) by mouth daily 90 capsule 3   • traZODone (DESYREL) 50 mg tablet Take 1/2 to 1 tab po qhs prn insomnia 90 tablet 1   • Trulicity 1.5 AK/7.9GM injection INJECT ONCE WEEKLY 6 mL 1   • Valbenazine Tosylate (Ingrezza) 40 MG CAPS Take 40 mg by mouth 2 (two) times a day Take 1 cap po qhs 180 capsule 0   • ammonium lactate (LAC-HYDRIN) 12 % cream Apply topically as needed for dry skin (Patient not taking: Reported on 2/24/2023) 385 g 0   • polyethylene glycol (GOLYTELY) 4000 mL solution Take 4,000 mL by mouth once for 1 dose 4000 mL 0     No current facility-administered medications for this visit. Past Medical History  Past Medical History:   Diagnosis Date   • ADHD, adult residual type    • Anxiety    • Anxiety    • Bipolar 1 disorder ([address])    • Cataract     Left eye   • CPAP (continuous positive airway pressure) dependence    • Depression    • Depression    • Diabetes mellitus (HCC)     blood sugar 167 on admission   • Equinus deformity of foot    • GERD (gastroesophageal reflux disease)    • Homicidal ideations    • Hyperlipidemia    • Hypertension    • Microalbuminuria    • Morbid obesity ([address])    • Neuropathy    • Shortness of breath    • Sleep apnea     CPAP at bedtime   • Sleep apnea    • Stroke [facility])    • Suicidal intent        Past Surgical History  Past Surgical History:   Procedure Laterality Date   • CATARACT EXTRACTION     • CATARACT EXTRACTION     • COLONOSCOPY     • HAND SURGERY Right    • OTHER SURGICAL HISTORY      REPAIR OF SUPERFICIAL WOUND FACE   • IL ESOPHAGOGASTRODUODENOSCOPY TRANSORAL DIAGNOSTIC N/A 06/14/2018    Procedure: ESOPHAGOGASTRODUODENOSCOPY (EGD); Surgeon: Kg Patrick MD;  [facility];   Service: Gastroenterology   • IL ESOPHAGOGASTRODUODENOSCOPY TRANSORAL DIAGNOSTIC N/A 09/12/2018    Procedure: EGD AND COLONOSCOPY;  Surgeon: Lee Ann Johns MD;  [facility]; Service: Gastroenterology       Past Social History   Social History     Socioeconomic History   • Marital status: /Civil Union     Spouse name: None   • Number of children: 1   • Years of education: None   • Highest education level: None   Occupational History   • Occupation: On disability   Tobacco Use   • Smoking status: Former     Types: Cigarettes     Quit date:      Years since quittin.8   • Smokeless tobacco: Former     Types: Chew   • Tobacco comments:     pt "Quit after approx over 25 years ago"   Vaping Use   • Vaping Use: Never used   Substance and Sexual Activity   • Alcohol use: Yes     Comment: rarely   • Drug use: Not Currently     Comment: quit 20 years ago   • Sexual activity: Yes     Partners: Female     Birth control/protection: None     Comment: steady girlfriend   Other Topics Concern   • None   Social History Narrative     from spouse, technically still  but living with other partner, partner's father, and early 2019, his partner's daughter and boyfriend moved in with their two children. Then the boyfriend moved out in 2019. Then partner's daughter moved out approx 2021. (Pt's partner has guardianship of the grandchildren per Pt). Children: 1 stepdaughter         Education:    Pt denies any hx of learning disabilities and reached childhood milestones on time as far as he knows. He wore braces for equinovarus but states he did not suffer delay in walking    Graduated High School --he was held back 3 times because "I was just lazy, didn't do the work."    No college    Took some core classes in [employer] [facility] and worked as a volunteer  from approx [phone]             Substance Abuse History:     Pt drinks ETOH approx q 3-4 months but denies any h/o ETOH abuse. Pt tried smoking Crack once in the past and has been smoking THC once q 2-3 months since 14y/o.      He denies other drug use, IVDA, addictions or drug abuse. Social Determinants of Health     Financial Resource Strain: Low Risk  (2/24/2023)    Overall Financial Resource Strain (CARDIA)    • Difficulty of Paying Living Expenses: Not hard at all   Food Insecurity: No Food Insecurity (2/24/2023)    Hunger Vital Sign    • Worried About Running Out of Food in the Last Year: Never true    • Ran Out of Food in the Last Year: Never true   Transportation Needs: No Transportation Needs (2/24/2023)    PRAPARE - Transportation    • Lack of Transportation (Medical): No    • Lack of Transportation (Non-Medical): No   Physical Activity: Inactive (9/14/2021)    Exercise Vital Sign    • Days of Exercise per Week: 0 days    • Minutes of Exercise per Session: 0 min   Stress: No Stress Concern Present (9/14/2021)    [facility]    • Feeling of Stress : Not at all   Social Connections:  Moderately Isolated (9/14/2021)    Social Connection and Isolation Panel [NHANES]    • Frequency of Communication with Friends and Family: More than three times a week    • Frequency of Social Gatherings with Friends and Family: Once a week    • Attends Quaker Services: Never    • Active Member of Clubs or Organizations: No    • Attends Club or Organization Meetings: Never    • Marital Status: Living with partner   Intimate Partner Violence: Not At Risk (9/14/2021)    Humiliation, Afraid, Rape, and Kick questionnaire    • Fear of Current or Ex-Partner: No    • Emotionally Abused: No    • Physically Abused: No    • Sexually Abused: No   Housing Stability: [address]  (5/11/2022)    Housing Stability Vital Sign    • Unable to Pay for Housing in the Last Year: No    • Number of [address] in the Last Year: 1    • Unstable Housing in the Last Year: No         Vital Signs  Vitals:    10/12/23 1336   BP: 134/70   BP Location: Right arm   Patient Position: Sitting   Cuff Size: Standard   Pulse: 98   SpO2: 98% Weight: 120 kg (264 lb 6.4 oz)   Height: 5' 7" (1.702 m)         Physical Exam:  General appearance: alert, cooperative, no distress  HEENT: normocephalic, anicteric, no eye erythema or discharge, no oropharyngeal thrush  Neck: supple, trachea midline, no adenopathy  Lungs: CTA b/l, no rales, rhonchi, or wheezing, unlabored respirations  Heart: RRR, no murmur, rubs, or gallops  Abdomen: soft, non-tender, non-distended, normal bowel sounds, no masses or organomegaly  Rectal: deferred  Extremities: 1+ edema B/L LE, no cyanosis, clubbing  Musculoskeletal: normal gait  Skin: color and texture normal, no jaundice, no rashes or lesions  Psychiatric: alert and oriented, difficult time sitting still. Arjun Rivera PA-C

## 2023-10-12 NOTE — TELEPHONE ENCOUNTER
Our mutual patient is scheduled for procedure: colonoscopy and EGD    On: December 22 , 2023     With: Dr. Bhumi Hammond.  Natali Ferrari MD    He/She is taking the following blood thinner:  Trulicity    Can this be stopped  7 days prior to the procedure    Physician Approving clearance: Dr. Jack Martinez

## 2023-10-17 ENCOUNTER — SOCIAL WORK (OUTPATIENT)
Dept: BEHAVIORAL/MENTAL HEALTH CLINIC | Facility: CLINIC | Age: 58
End: 2023-10-17
Payer: MEDICARE

## 2023-10-17 ENCOUNTER — TELEPHONE (OUTPATIENT)
Dept: PSYCHIATRY | Facility: CLINIC | Age: 58
End: 2023-10-17

## 2023-10-17 DIAGNOSIS — F31.76 BIPOLAR I DISORDER, MOST RECENT EPISODE DEPRESSED, IN FULL REMISSION (HCC): Primary | ICD-10-CM

## 2023-10-17 DIAGNOSIS — F41.0 PANIC ATTACKS: ICD-10-CM

## 2023-10-17 DIAGNOSIS — G47.00 INSOMNIA, UNSPECIFIED TYPE: ICD-10-CM

## 2023-10-17 DIAGNOSIS — F41.1 GENERALIZED ANXIETY DISORDER: ICD-10-CM

## 2023-10-17 PROCEDURE — 90834 PSYTX W PT 45 MINUTES: CPT | Performed by: SOCIAL WORKER

## 2023-10-17 NOTE — PSYCH
Behavioral Health Psychotherapy Progress Note    Psychotherapy Provided: Individual Psychotherapy     1. Bipolar I disorder, most recent episode depressed, in full remission ([address])        2. Generalized anxiety disorder        3. Panic attacks        4. Insomnia, unspecified type            Goals addressed in session: Goal 1     DATA: Braxton Taylor shared that the new psych medication he started on Thursday has caused dizziness, stumbling, tripping and he has shortness of breath. He also later shared it, in his words ,zapped his energy. He stopped it today. He could not remember the name of the medication. I notified Gali Rojas and Lemuel Moses. He shared his father in law recently has become nice. He shared his depression is at a 5 and his anxiety is minimal. Nursing called him while the undersigned was seeing him. She wants him to stay on it but to be on 40mg versus 80 mg. He has the number for nursing. I provided support, encouragement and strategies to cope. During this session, this clinician used the following therapeutic modalities: Client-centered Therapy, Cognitive Behavioral Therapy, Mindfulness-based Strategies, and Supportive Psychotherapy    Substance Abuse was not addressed during this session. If the client is diagnosed with a co-occurring substance use disorder, please indicate any changes in the frequency or amount of use: n/a. Stage of change for addressing substance use diagnoses: No substance use/Not applicable    ASSESSMENT:  Arturo Nova presents with a  upset   mood concerning his medication. his affect is upset which is congruent, with his mood and the content of the session. The client has made progress on their goals. However he was upset about having shortness of breath. Arturo Nova presents with a none risk of suicide, none risk of self-harm, and none risk of harm to others. For any risk assessment that surpasses a "low" rating, a safety plan must be developed.     A safety plan was indicated: no  If yes, describe in detail n/a    PLAN: Between sessions, Kelly Block will use mindfulness and CBT. At the next session, the therapist will use Client-centered Therapy, Cognitive Behavioral Therapy, Mindfulness-based Strategies, and Supportive Psychotherapy to address issues and symptoms as they may arise. .    Behavioral Health Treatment Plan and Discharge Planning: Kelly Block is aware of and agrees to continue to work on their treatment plan. They have identified and are working toward their discharge goals.  yes    Visit start and stop times:    10/17/23  Start Time: 1310  Stop Time: 1400  Total Visit Time: 50 minutes

## 2023-10-17 NOTE — TELEPHONE ENCOUNTER
Patient is coming to an appointment and wanted to send provider routed in this message. He said that the medicine that Provider prescribed is making him feel dizzy. He doesn't like it and needs it to be addressed. Thank you. He is seeing Speedy Marie at 6 today. Thank you.

## 2023-10-17 NOTE — TELEPHONE ENCOUNTER
Received a message from Juana Breaux regarding side effects of a new medication. Spoke with Tammi Rangel and he confirmed the message. He started taking the medication on Thursday. He had some trouble with the name, but said it was the one that had to get approved - Juaquin Stapleton. He started the increased dose on Thursday and the dizziness started on Saturday. Instructed him to only take 1 dose of 40 mg until he hears from [facility] or nursing. He was also given the direct number for nursing to call with questions/concerns.

## 2023-10-17 NOTE — TELEPHONE ENCOUNTER
Pt saw his therapist Padmini Loya for an appt earlier today and Marco Tong sent me a msg regarding SE that Trav Lal was having since the increase in [place]. He also notified the nurse Anderson Pedroza who instructed him to at least reduce the dose back to qd until speaking with me. I called Verónica Wilner via his cell phone # of record and he reports he had no SE on the 40mg qd dose of [place] but a few days after increasing the dose to 40mg bid he developed shakiness, SOB, dizziness, speech was slurred earlier today and he has been stumbling over his feet. Pt's wife Seun Johnson was talking in the background and I asked Pt if he would be open to putting the call on speaker phone and he did. Marie and Pt reported that he took his last Ingrezza capsule last night. Now the SOB is only when he walks and Per Marie the slurring is still there, but it is a little bit better. His balance is now "Half-way decent." He was a bit confused on time per Seun Johnson. Pt states his balance is now "Much better" than it was. Since he is improving but still has concerning Sxs, I instructed him to discontinue the Ingrezza at this point to allow a washout. I advised that if any Sxs begin to worsen, he must go to the ER immediately. If nothing worsens but he continues to have the SE he is experiencing now, but without improvement tomorrow, I advised him to go to the ER for evaluation. In any case, I asked that he contact the office to update me on how he is doing tomorrow.

## 2023-10-26 ENCOUNTER — RA CDI HCC (OUTPATIENT)
Dept: OTHER | Facility: HOSPITAL | Age: 58
End: 2023-10-26

## 2023-10-26 NOTE — PROGRESS NOTES
[address] [facility] coding opportunities          Chart Reviewed number of suggestions sent to Provider: 2  E11.22  E11.29     Patients Insurance     Medicare Insurance: [employer]

## 2023-10-31 ENCOUNTER — OFFICE VISIT (OUTPATIENT)
Dept: PODIATRY | Facility: CLINIC | Age: 58
End: 2023-10-31
Payer: MEDICARE

## 2023-10-31 VITALS
DIASTOLIC BLOOD PRESSURE: 93 MMHG | RESPIRATION RATE: 18 BRPM | BODY MASS INDEX: 41.41 KG/M2 | HEART RATE: 87 BPM | HEIGHT: 67 IN | SYSTOLIC BLOOD PRESSURE: 151 MMHG

## 2023-10-31 DIAGNOSIS — E11.42 DIABETIC POLYNEUROPATHY ASSOCIATED WITH TYPE 2 DIABETES MELLITUS (HCC): Primary | ICD-10-CM

## 2023-10-31 PROCEDURE — G0127 TRIM NAIL(S): HCPCS | Performed by: PODIATRIST

## 2023-10-31 NOTE — PROGRESS NOTES
Established patient with class findings presents for nail care. Vascular exam:  DP  0/4 bilateral; PT  0 4 bilateral   Dermatological exam:  Each toenail is thick and  dystrophic. Diagnosis:  Diabetes mellitus  Treatment: Trimmed multiple dystrophic toenails.      Nail removal    Date/Time: 10/31/2023 2:30 PM    Performed by: Alysha Craig DPM  Authorized by: Alysha Craig DPM    Nails trimmed:     Number of nails trimmed:  10

## 2023-11-02 ENCOUNTER — TELEPHONE (OUTPATIENT)
Dept: INTERNAL MEDICINE CLINIC | Facility: CLINIC | Age: 58
End: 2023-11-02

## 2023-11-06 ENCOUNTER — IMMUNIZATIONS (OUTPATIENT)
Dept: INTERNAL MEDICINE CLINIC | Facility: CLINIC | Age: 58
End: 2023-11-06

## 2023-11-06 DIAGNOSIS — Z23 ENCOUNTER FOR IMMUNIZATION: Primary | ICD-10-CM

## 2023-11-06 PROCEDURE — 90686 IIV4 VACC NO PRSV 0.5 ML IM: CPT

## 2023-11-06 PROCEDURE — G0008 ADMIN INFLUENZA VIRUS VAC: HCPCS

## 2023-11-06 NOTE — PROGRESS NOTES
Patient seen today on nurse schedule for influenza vaccine. Administered in patients right deltoid, patient tolerated well.

## 2023-11-14 ENCOUNTER — SOCIAL WORK (OUTPATIENT)
Dept: BEHAVIORAL/MENTAL HEALTH CLINIC | Facility: CLINIC | Age: 58
End: 2023-11-14
Payer: MEDICARE

## 2023-11-14 DIAGNOSIS — K44.9 HIATAL HERNIA: ICD-10-CM

## 2023-11-14 DIAGNOSIS — K22.2 LOWER ESOPHAGEAL RING (SCHATZKI): ICD-10-CM

## 2023-11-14 DIAGNOSIS — F41.0 PANIC ATTACKS: ICD-10-CM

## 2023-11-14 DIAGNOSIS — F41.1 GENERALIZED ANXIETY DISORDER: ICD-10-CM

## 2023-11-14 DIAGNOSIS — F31.76 BIPOLAR I DISORDER, MOST RECENT EPISODE DEPRESSED, IN FULL REMISSION (HCC): Primary | ICD-10-CM

## 2023-11-14 DIAGNOSIS — K21.00 GASTROESOPHAGEAL REFLUX DISEASE WITH ESOPHAGITIS: ICD-10-CM

## 2023-11-14 PROCEDURE — 90834 PSYTX W PT 45 MINUTES: CPT | Performed by: SOCIAL WORKER

## 2023-11-14 RX ORDER — OMEPRAZOLE 20 MG/1
20 CAPSULE, DELAYED RELEASE ORAL DAILY
Qty: 90 CAPSULE | Refills: 3 | Status: SHIPPED | OUTPATIENT
Start: 2023-11-14

## 2023-11-14 NOTE — PSYCH
Behavioral Health Psychotherapy Progress Note    Psychotherapy Provided: Individual Psychotherapy     1. Bipolar I disorder, most recent episode depressed, in full remission ([address])        2. Generalized anxiety disorder        3. Panic attacks            Goals addressed in session: Goal 1     DATA: His stepdaughter just lost her 47year old father from cancer cirroshis and kidney disease. This was also Orlando's friend and the  is Thursday. He discussed how he and his partner Kasie Briceno is still raising the grandchildren of the man who . Daron Lee is getting along with his father in law. The father in law is drinking less. I provided support and strategies to cope. He is doing better regarding his medication regimen. During this session, this clinician used the following therapeutic modalities: Client-centered Therapy, Cognitive Behavioral Therapy, Mindfulness-based Strategies, and Supportive Psychotherapy    Substance Abuse was not addressed during this session. If the client is diagnosed with a co-occurring substance use disorder, please indicate any changes in the frequency or amount of use: n/a. Stage of change for addressing substance use diagnoses: No substance use/Not applicable    ASSESSMENT:  Evelia Lang presents with a Depressed mood. his affect is depressed which is congruent, with his mood and the content of the session. The client has made progress on their goals. Evelia Lang presents with a none risk of suicide, none risk of self-harm, and none risk of harm to others. For any risk assessment that surpasses a "low" rating, a safety plan must be developed. A safety plan was indicated: no  If yes, describe in detail n/a    PLAN: Between sessions, Evelia Lang will use mindfulness and CBT.  At the next session, the therapist will use Client-centered Therapy, Cognitive Behavioral Therapy, Mindfulness-based Strategies, and Supportive Psychotherapy to address issues and symptoms as they may arise. .    Behavioral Health Treatment Plan and Discharge Planning: Melissa Mccoy is aware of and agrees to continue to work on their treatment plan. They have identified and are working toward their discharge goals.  yes    Visit start and stop times:    11/14/23  Start Time: 1510  Stop Time: 1600  Total Visit Time: 50 minutes

## 2023-11-20 ENCOUNTER — TELEPHONE (OUTPATIENT)
Dept: PSYCHIATRY | Facility: CLINIC | Age: 58
End: 2023-11-20

## 2023-11-20 NOTE — TELEPHONE ENCOUNTER
Patient is calling regarding cancelling an appointment.     Date/Time: 11/22/2023  at 1:30 pm    Reason: can't make it    Patient was rescheduled: YES [x] NO []  If yes, when was Patient reschedule for: 11/29/2023  at 10 am    Patient requesting call back to reschedule: YES [] NO [x]
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
## 2023-11-27 ENCOUNTER — OFFICE VISIT (OUTPATIENT)
Dept: INTERNAL MEDICINE CLINIC | Facility: CLINIC | Age: 58
End: 2023-11-27

## 2023-11-27 VITALS
HEIGHT: 67 IN | BODY MASS INDEX: 44.26 KG/M2 | TEMPERATURE: 98.1 F | SYSTOLIC BLOOD PRESSURE: 171 MMHG | DIASTOLIC BLOOD PRESSURE: 100 MMHG | WEIGHT: 282 LBS | HEART RATE: 99 BPM

## 2023-11-27 DIAGNOSIS — K21.9 GASTROESOPHAGEAL REFLUX DISEASE, UNSPECIFIED WHETHER ESOPHAGITIS PRESENT: ICD-10-CM

## 2023-11-27 DIAGNOSIS — I10 HYPERTENSION, UNSPECIFIED TYPE: Primary | ICD-10-CM

## 2023-11-27 DIAGNOSIS — E11.65 TYPE 2 DIABETES MELLITUS WITH HYPERGLYCEMIA, UNSPECIFIED WHETHER LONG TERM INSULIN USE (HCC): ICD-10-CM

## 2023-11-27 DIAGNOSIS — E78.2 MIXED HYPERLIPIDEMIA: ICD-10-CM

## 2023-11-27 DIAGNOSIS — F31.5 BIPOLAR I DISORDER, SEVERE, CURRENT OR MOST RECENT EPISODE DEPRESSED, WITH PSYCHOTIC FEATURES (HCC): ICD-10-CM

## 2023-11-27 PROCEDURE — G0438 PPPS, INITIAL VISIT: HCPCS | Performed by: INTERNAL MEDICINE

## 2023-11-27 RX ORDER — ATORVASTATIN CALCIUM 20 MG/1
20 TABLET, FILM COATED ORAL DAILY
Qty: 90 TABLET | Refills: 3 | Status: SHIPPED | OUTPATIENT
Start: 2023-11-27

## 2023-11-27 RX ORDER — AMLODIPINE BESYLATE AND BENAZEPRIL HYDROCHLORIDE 5; 10 MG/1; MG/1
1 CAPSULE ORAL DAILY
Qty: 90 CAPSULE | Refills: 1 | Status: SHIPPED | OUTPATIENT
Start: 2023-11-27 | End: 2023-12-01

## 2023-11-27 NOTE — ASSESSMENT & PLAN NOTE
Lab Results   Component Value Date    HGBA1C 6.5 (H) 10/09/2023     Well controlled, most recent D0A 6.5  Meds: Trulicity 4.7IA weekly    Plan:  -cont above medication  -will routinely monitor A1C

## 2023-11-27 NOTE — ASSESSMENT & PLAN NOTE
Hx of HTN  Was previously on lisinopril-HCTZ 20-25. Unclear why medication was stopped.    Given elevated office pressure to 171/100 and episodes of HA and dizziness, will restart blood pressure medication    Plan:  -start amlodipine-benazepril 5-10 once daily  -pt instructed to do a blood pressure log   -will come in for BP check with nurse in a few weeks  -follow up in 3 months

## 2023-11-27 NOTE — ASSESSMENT & PLAN NOTE
Pt endorses acid reflux well controlled on omeprazole. Pt has appointment for endoscopy with GI for 12/22/23 regarding appetite changes.      Plan:  -cont above medication  -cont following with GI

## 2023-11-27 NOTE — PATIENT INSTRUCTIONS
Medicare Preventive Visit Patient Instructions  Thank you for completing your Welcome to Medicare Visit or Medicare Annual Wellness Visit today. Your next wellness visit will be due in one year (11/27/2024). The screening/preventive services that you may require over the next 5-10 years are detailed below. Some tests may not apply to you based off risk factors and/or age. Screening tests ordered at today's visit but not completed yet may show as past due. Also, please note that scanned in results may not display below. Preventive Screenings:  Service Recommendations Previous Testing/Comments   Colorectal Cancer Screening  Colonoscopy    Fecal Occult Blood Test (FOBT)/Fecal Immunochemical Test (FIT)  Fecal DNA/Cologuard Test  Flexible Sigmoidoscopy Age: 43-73 years old   Colonoscopy: every 10 years (May be performed more frequently if at higher risk)  OR  FOBT/FIT: every 1 year  OR  Cologuard: every 3 years  OR  Sigmoidoscopy: every 5 years  Screening may be recommended earlier than age 39 if at higher risk for colorectal cancer. Also, an individualized decision between you and your healthcare provider will decide whether screening between the ages of 77-80 would be appropriate. Colonoscopy: 08/24/2023  FOBT/FIT: Not on file  Cologuard: Not on file  Sigmoidoscopy: Not on file          Prostate Cancer Screening Individualized decision between patient and health care provider in men between ages of 53-66   Medicare will cover every 12 months beginning on the day after your 50th birthday PSA: No results in last 5 years           Hepatitis C Screening Once for adults born between 1945 and 1965  More frequently in patients at high risk for Hepatitis C Hep C Antibody: 04/06/2022        Diabetes Screening 1-2 times per year if you're at risk for diabetes or have pre-diabetes Fasting glucose: 122 mg/dL (2/23/2023)  A1C: 6.5 % (10/9/2023)      Cholesterol Screening Once every 5 years if you don't have a lipid disorder.  May order more often based on risk factors. Lipid panel: 04/06/2022         Other Preventive Screenings Covered by Medicare:  Abdominal Aortic Aneurysm (AAA) Screening: covered once if your at risk. You're considered to be at risk if you have a family history of AAA or a male between the age of 70-76 who smoking at least 100 cigarettes in your lifetime. Lung Cancer Screening: covers low dose CT scan once per year if you meet all of the following conditions: (1) Age 48-67; (2) No signs or symptoms of lung cancer; (3) Current smoker or have quit smoking within the last 15 years; (4) You have a tobacco smoking history of at least 20 pack years (packs per day x number of years you smoked); (5) You get a written order from a healthcare provider. Glaucoma Screening: covered annually if you're considered high risk: (1) You have diabetes OR (2) Family history of glaucoma OR (3)  aged 48 and older OR (3)  American aged 72 and older  Osteoporosis Screening: covered every 2 years if you meet one of the following conditions: (1) Have a vertebral abnormality; (2) On glucocorticoid therapy for more than 3 months; (3) Have primary hyperparathyroidism; (4) On osteoporosis medications and need to assess response to drug therapy. HIV Screening: covered annually if you're between the age of 14-79. Also covered annually if you are younger than 13 and older than 72 with risk factors for HIV infection. For pregnant patients, it is covered up to 3 times per pregnancy.     Immunizations:  Immunization Recommendations   Influenza Vaccine Annual influenza vaccination during flu season is recommended for all persons aged >= 6 months who do not have contraindications   Pneumococcal Vaccine   * Pneumococcal conjugate vaccine = PCV13 (Prevnar 13), PCV15 (Vaxneuvance), PCV20 (Prevnar 20)  * Pneumococcal polysaccharide vaccine = PPSV23 (Pneumovax) Adults 39-02 yo with certain risk factors or if 69+ yo  If never received any pneumonia vaccine: recommend Prevnar 21 (PCV20)  Give PCV20 if previously received 1 dose of PCV13 or PPSV23   Hepatitis B Vaccine 3 dose series if at intermediate or high risk (ex: diabetes, end stage renal disease, liver disease)   Respiratory syncytial virus (RSV) Vaccine - COVERED BY MEDICARE PART D  * RSVPreF3 (Arexvy) CDC recommends that adults 61years of age and older may receive a single dose of RSV vaccine using shared clinical decision-making (SCDM)   Tetanus (Td) Vaccine - COST NOT COVERED BY MEDICARE PART B Following completion of primary series, a booster dose should be given every 10 years to maintain immunity against tetanus. Td may also be given as tetanus wound prophylaxis. Tdap Vaccine - COST NOT COVERED BY MEDICARE PART B Recommended at least once for all adults. For pregnant patients, recommended with each pregnancy. Shingles Vaccine (Shingrix) - COST NOT COVERED BY MEDICARE PART B  2 shot series recommended in those 19 years and older who have or will have weakened immune systems or those 50 years and older     Health Maintenance Due:      Topic Date Due    Colorectal Cancer Screening  09/12/2028    HIV Screening  Completed    Hepatitis C Screening  Completed     Immunizations Due:      Topic Date Due    Pneumococcal Vaccine: Pediatrics (0 to 5 Years) and At-Risk Patients (6 to 59 Years) (2 - PCV) 12/04/2019    COVID-19 Vaccine (3 - Pfizer series) 06/19/2021     Advance Directives   What are advance directives? Advance directives are legal documents that state your wishes and plans for medical care. These plans are made ahead of time in case you lose your ability to make decisions for yourself. Advance directives can apply to any medical decision, such as the treatments you want, and if you want to donate organs. What are the types of advance directives? There are many types of advance directives, and each state has rules about how to use them.  You may choose a combination of any of the following:  Living will: This is a written record of the treatment you want. You can also choose which treatments you do not want, which to limit, and which to stop at a certain time. This includes surgery, medicine, IV fluid, and tube feedings. Durable power of  for healthcare [facility]): This is a written record that states who you want to make healthcare choices for you when you are unable to make them for yourself. This person, called a proxy, is usually a family member or a friend. You may choose more than 1 proxy. Do not resuscitate (DNR) order:  A DNR order is used in case your heart stops beating or you stop breathing. It is a request not to have certain forms of treatment, such as CPR. A DNR order may be included in other types of advance directives. Medical directive: This covers the care that you want if you are in a coma, near death, or unable to make decisions for yourself. You can list the treatments you want for each condition. Treatment may include pain medicine, surgery, blood transfusions, dialysis, IV or tube feedings, and a ventilator (breathing machine). Values history: This document has questions about your views, beliefs, and how you feel and think about life. This information can help others choose the care that you would choose. Why are advance directives important? An advance directive helps you control your care. Although spoken wishes may be used, it is better to have your wishes written down. Spoken wishes can be misunderstood, or not followed. Treatments may be given even if you do not want them. An advance directive may make it easier for your family to make difficult choices about your care. Weight Management   Why it is important to manage your weight:  Being overweight increases your risk of health conditions such as heart disease, high blood pressure, type 2 diabetes, and certain types of cancer.  It can also increase your risk for osteoarthritis, sleep apnea, and other respiratory problems. Aim for a slow, steady weight loss. Even a small amount of weight loss can lower your risk of health problems. How to lose weight safely:  A safe and healthy way to lose weight is to eat fewer calories and get regular exercise. You can lose up about 1 pound a week by decreasing the number of calories you eat by 500 calories each day. Healthy meal plan for weight management:  A healthy meal plan includes a variety of foods, contains fewer calories, and helps you stay healthy. A healthy meal plan includes the following:  Eat whole-grain foods more often. A healthy meal plan should contain fiber. Fiber is the part of grains, fruits, and vegetables that is not broken down by your body. Whole-grain foods are healthy and provide extra fiber in your diet. Some examples of whole-grain foods are whole-wheat breads and pastas, oatmeal, brown rice, and bulgur. Eat a variety of vegetables every day. Include dark, leafy greens such as spinach, kale, jammie greens, and mustard greens. Eat yellow and orange vegetables such as carrots, sweet potatoes, and winter squash. Eat a variety of fruits every day. Choose fresh or canned fruit (canned in its own juice or light syrup) instead of juice. Fruit juice has very little or no fiber. Eat low-fat dairy foods. Drink fat-free (skim) milk or 1% milk. Eat fat-free yogurt and low-fat cottage cheese. Try low-fat cheeses such as mozzarella and other reduced-fat cheeses. Choose meat and other protein foods that are low in fat. Choose beans or other legumes such as split peas or lentils. Choose fish, skinless poultry (chicken or turkey), or lean cuts of red meat (beef or pork). Before you cook meat or poultry, cut off any visible fat. Use less fat and oil. Try baking foods instead of frying them. Add less fat, such as margarine, sour cream, regular salad dressing and mayonnaise to foods. Eat fewer high-fat foods.  Some examples of high-fat foods include french fries, doughnuts, ice cream, and cakes. Eat fewer sweets. Limit foods and drinks that are high in sugar. This includes candy, cookies, regular soda, and sweetened drinks. Exercise:  Exercise at least 30 minutes per day on most days of the week. Some examples of exercise include walking, biking, dancing, and swimming. You can also fit in more physical activity by taking the stairs instead of the elevator or parking farther away from stores. Ask your healthcare provider about the best exercise plan for you. © Copyright [employer] 2018 Information is for End User's use only and may not be sold, redistributed or otherwise used for commercial purposes. All illustrations and images included in CareNotes® are the copyrighted property of [employer] or [address] [place] [address]      Take Miralax once daily as needed for constipation. Start new blood pressure medication. Take it once daily.      Take your blood pressure once a day at any time and write down the numbers    Come in for a blood pressure check in a few weeks

## 2023-11-27 NOTE — ASSESSMENT & PLAN NOTE
Hx of bipolar disorder. Follows with psychiatrist who manages his medications. Feels well today. Does not endorse any mood changes.      Plan:  -cont mediations per psychiatry

## 2023-11-27 NOTE — ASSESSMENT & PLAN NOTE
Hx of HLD   Last lipid panel 4/22: total cholesterol 79, LDL 31  Medication: atorvastatin 20mg    Plan:  -cont above medication  -can repeat lipid panel 3 years from last

## 2023-11-27 NOTE — PROGRESS NOTES
Assessment and Plan:     Problem List Items Addressed This Visit       Bipolar I disorder, severe, current or most recent episode depressed, with psychotic features ([address])     Hx of bipolar disorder. Follows with psychiatrist who manages his medications. Feels well today. Does not endorse any mood changes. Plan:  -cont mediations per psychiatry          Type 2 diabetes mellitus with hyperglycemia ([address])       Lab Results   Component Value Date    HGBA1C 6.5 (H) 10/09/2023   Well controlled, most recent A1E 6.5  Meds: Trulicity 2.5RA weekly    Plan:  -cont above medication  -will routinely monitor A1C          Relevant Medications    atorvastatin (LIPITOR) 20 mg tablet    GERD (gastroesophageal reflux disease)     Pt endorses acid reflux well controlled on omeprazole. Pt has appointment for endoscopy with GI for 12/22/23 regarding appetite changes. Plan:  -cont above medication  -cont following with GI          Hyperlipidemia     Hx of HLD   Last lipid panel 4/22: total cholesterol 79, LDL 31  Medication: atorvastatin 20mg    Plan:  -cont above medication  -can repeat lipid panel 3 years from last         Relevant Medications    atorvastatin (LIPITOR) 20 mg tablet    Hypertension - Primary     Hx of HTN  Was previously on lisinopril-HCTZ 20-25. Unclear why medication was stopped. Given elevated office pressure to 171/100 and episodes of HA and dizziness, will restart blood pressure medication    Plan:  -start amlodipine-benazepril 5-10 once daily  -pt instructed to do a blood pressure log   -will come in for BP check with nurse in a few weeks  -follow up in 3 months          Relevant Medications    amLODIPine-benazepril (LOTREL 5-10) 5-10 MG per capsule        Preventive health issues were discussed with patient, and age appropriate screening tests were ordered as noted in patient's After Visit Summary.   Personalized health advice and appropriate referrals for health education or preventive services given if needed, as noted in patient's After Visit Summary. History of Present Illness:     Patient presents for a Medicare Wellness Visit. Pt does not endorse any major complaints. Has tension headaches daily in the morning when he wakes up. Sleeps well, about 8-10 hrs/ day. Drinks a lot of sweet tea, but not much water. Patient Care Team:  Brunilda Yung MD as PCP - General (Internal Medicine)  Rose Laureano, Chandan Ortiz MD (Nephrology)  Herminia Sullivan MD as Endoscopist     Review of Systems:     Review of Systems   Constitutional:  Negative for chills and fever. Respiratory:  Negative for cough and shortness of breath. Cardiovascular:  Negative for chest pain and palpitations. Gastrointestinal:  Negative for nausea and vomiting. Genitourinary:  Negative for dysuria and hematuria. Musculoskeletal:  Negative for back pain and neck pain. Skin:  Negative for rash. Neurological:  Positive for dizziness and headaches. Negative for light-headedness. Psychiatric/Behavioral:  Negative for dysphoric mood. The patient is not nervous/anxious. Diabetic Foot Exam    Patient's shoes and socks removed. Right Foot/Ankle   Right Foot Inspection  Skin Exam: skin intact and dry skin. Toe Exam: ROM and strength within normal limits. No swelling, no tenderness and erythema    Sensory   Vibration: intact  Proprioception: intact  Monofilament testing: intact    Vascular  The right DP pulse is 2+. The right PT pulse is 2+. Left Foot/Ankle  Left Foot Inspection  Skin Exam: skin intact and dry skin. Toe Exam: No swelling, no tenderness and no erythema. Sensory   Vibration: intact  Proprioception: intact  Monofilament testing: intact    Vascular  The left DP pulse is 2+. The left PT pulse is 2+.      Assign Risk Category  No deformity present  Risk: 0       Problem List:     Patient Active Problem List   Diagnosis    Bipolar I disorder, severe, current or most recent episode depressed, with psychotic features ([address])    Panic attacks    Flat foot    Diabetic polyneuropathy (HCC)    Benign hypertension with CKD (chronic kidney disease) stage III ([facility])    Allergic rhinitis    GERD (gastroesophageal reflux disease)    Insomnia    Hiatal hernia    Lower esophageal ring ([place])    Generalized anxiety disorder    DAISY (obstructive sleep apnea)    Stage 3 chronic kidney disease (HCC)    Type 2 diabetes mellitus with stage 3 chronic kidney disease and hypertension (HCC)    Persistent proteinuria    Anemia, unspecified    Toenail deformity    Hyperlipidemia    Vitamin D deficiency    Class 3 severe obesity due to excess calories with serious comorbidity and body mass index (BMI) of 45.0 to 49.9 in adult (HCC)    Lightheaded    Loss of appetite    Unintended weight loss    Tardive dyskinesia      Past Medical and Surgical History:     Past Medical History:   Diagnosis Date    ADHD, adult residual type     Anxiety     Anxiety     Bipolar 1 disorder (HCC)     Cataract     Left eye    CPAP (continuous positive airway pressure) dependence     Depression     Depression     Diabetes mellitus (HCC)     blood sugar 167 on admission    Equinus deformity of foot     GERD (gastroesophageal reflux disease)     Homicidal ideations     Hyperlipidemia     Hypertension     Microalbuminuria     Morbid obesity ([facility])     Neuropathy     Shortness of breath     Sleep apnea     CPAP at bedtime    Sleep apnea     Stroke ([address])     Suicidal intent      Past Surgical History:   Procedure Laterality Date    CATARACT EXTRACTION      CATARACT EXTRACTION      COLONOSCOPY      HAND SURGERY Right     OTHER SURGICAL HISTORY      REPAIR OF SUPERFICIAL WOUND FACE    CO ESOPHAGOGASTRODUODENOSCOPY TRANSORAL DIAGNOSTIC N/A 06/14/2018    Procedure: ESOPHAGOGASTRODUODENOSCOPY (EGD); Surgeon: Angélica Iverson MD;  Location: [facility];   Service: Gastroenterology    CO ESOPHAGOGASTRODUODENOSCOPY TRANSORAL DIAGNOSTIC N/A 09/12/2018 Procedure: EGD AND COLONOSCOPY;  Surgeon: Reji Cassidy MD;  Location: [facility]; Service: Gastroenterology      Family History:     Family History   Problem Relation Age of Onset    Diabetes Mother     Alcohol abuse Father     Diabetes Father     Hypertension Father     Lung cancer Father     Stroke Father     Cancer Father         lung    Alcohol abuse Sister     Depression Sister     Lymphoma Sister     Alcohol abuse Family     Alcohol abuse Sister     Alcohol abuse Sister     Cancer Sister     Heart disease Neg Hx     Thyroid disease Neg Hx       Social History:     Social History     Socioeconomic History    Marital status: /Civil Union     Spouse name: None    Number of children: 1    Years of education: None    Highest education level: None   Occupational History    Occupation: On disability   Tobacco Use    Smoking status: Former     Types: Cigarettes     Quit date:      Years since quittin.9    Smokeless tobacco: Former     Types: Chew    Tobacco comments:     pt "Quit after approx over 25 years ago"   Vaping Use    Vaping Use: Never used   Substance and Sexual Activity    Alcohol use: Yes     Comment: rarely    Drug use: Not Currently     Comment: quit 20 years ago    Sexual activity: Yes     Partners: Female     Birth control/protection: None     Comment: steady girlfriend   Other Topics Concern    None   Social History Narrative     from spouse, technically still  but living with other partner, partner's father, and early 2019, his partner's daughter and boyfriend moved in with their two children. Then the boyfriend moved out in 2019. Then partner's daughter moved out approx 2021. (Pt's partner has guardianship of the grandchildren per Pt). Children: 1 stepdaughter         Education:    Pt denies any hx of learning disabilities and reached childhood milestones on time as far as he knows.   He wore braces for equinovarus but states he did not suffer delay in walking    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 because "I was just lazy, didn't do the work."    No college    Took some core classes in [employer] and worked as a volunteer  from approx [id number]             Substance Abuse History:     Pt drinks ETOH approx q 3-4 months but denies any h/o ETOH abuse. Pt tried smoking Crack once in the past and has been smoking THC once q 2-3 months since 14y/o. He denies other drug use, IVDA, addictions or drug abuse. Social Determinants of Health     Financial Resource Strain: Low Risk  (11/27/2023)    Overall Financial Resource Strain (CARDIA)     Difficulty of Paying Living Expenses: Not hard at all   Food Insecurity: No Food Insecurity (11/27/2023)    Hunger Vital Sign     Worried About Running Out of Food in the Last Year: Never true     Ran Out of Food in the Last Year: Never true   Transportation Needs: No Transportation Needs (11/27/2023)    PRAPARE - Transportation     Lack of Transportation (Medical): No     Lack of Transportation (Non-Medical): No   Physical Activity: Inactive (9/14/2021)    Exercise Vital Sign     Days of Exercise per Week: 0 days     Minutes of Exercise per Session: 0 min   Stress: No Stress Concern Present (9/14/2021)    [address]     Feeling of Stress : Not at all   Social Connections:  Moderately Isolated (9/14/2021)    Social Connection and Isolation Panel [NHANES]     Frequency of Communication with Friends and Family: More than three times a week     Frequency of Social Gatherings with Friends and Family: Once a week     Attends Tenriism Services: Never     Active Member of Clubs or Organizations: No     Attends Club or Organization Meetings: Never     Marital Status: Living with partner   Intimate Partner Violence: Not At Risk (9/14/2021)    Humiliation, Afraid, Rape, and Kick questionnaire     Fear of Current or Ex-Partner: No Emotionally Abused: No     Physically Abused: No     Sexually Abused: No   Housing Stability: Low Risk  (5/11/2022)    Housing Stability Vital Sign     Unable to Pay for Housing in the Last Year: No     Number of Places Lived in the Last Year: 1     Unstable Housing in the Last Year: No      Medications and Allergies:     Current Outpatient Medications   Medication Sig Dispense Refill    ammonium lactate (LAC-HYDRIN) 12 % cream Apply topically as needed for dry skin 385 g 0    ammonium lactate (LAC-HYDRIN) 12 % cream Apply topically as needed for dry skin (Patient not taking: Reported on 2/24/2023) 385 g 0    atorvastatin (LIPITOR) 20 mg tablet Take 1 tablet (20 mg total) by mouth daily 90 tablet 3    benztropine (COGENTIN) 2 mg tablet Take 2 mg by mouth 2 (two) times a day      Blood Glucose Monitoring Suppl (FREESTYLE LITE) ANTONIA by Does not apply route 3 (three) times a day 1 each 0    Blood Glucose Monitoring Suppl (FreeStyle Lite) w/Device KIT USE AS INSTRUCTED 4 TIMES A DAY 1 kit 0    buPROPion (WELLBUTRIN) 100 mg tablet Take 1 tablet (100 mg total) by mouth 2 (two) times a day Take bid--(once in the AM and once at 12 noon) 180 tablet 1    clonazePAM (KlonoPIN) 1 mg tablet TAKE 1 TABLET BY MOUTH EVERY DAY AS NEEDED FOR ANXIETY OR PANIC ATTACKS 30 tablet 5    Continuous Blood Gluc  (FreeStyle [place] 14 Day [place]) ANTONIA Use  to check BG at least 4 times a day 1 each 0    Continuous Blood Gluc Sensor (FreeStyle [place] 14 Day Sensor) MISC Apply sensor every 14 days to check BG at least 4 times a day 2 each 5    docusate sodium (COLACE) 250 MG capsule Take 1 capsule (250 mg total) by mouth daily 30 capsule 5    FREESTYLE LITE test strip CHECK BLOOD SUGARS FOUR TIMES DAILY 400 strip 3    glucose monitoring kit (FREESTYLE) monitoring kit Use 1 each 4 (four) times a day Fine to substitute other brand if not covered by insurance 1 each 0    Insulin Pen Needle 31G X 8 MM MISC Check sugars three times a day 100 each 1    INSULIN SYRINGE .5CC/29G 29G X 1/2" 0.5 ML MISC Use      Lancets (FREESTYLE) lancets USE THREE TIMES DAILY AS DIRECTED 300 each 0    OLANZapine (ZyPREXA) 10 mg tablet Take 1 tablet (10 mg total) by mouth daily at bedtime 90 tablet 0    omeprazole (PriLOSEC) 20 mg delayed release capsule TAKE 1 CAPSULE(20 MG) BY MOUTH DAILY 90 capsule 3    polyethylene glycol (GOLYTELY) 4000 mL solution Take 4,000 mL by mouth once for 1 dose 4000 mL 0    traZODone (DESYREL) 50 mg tablet Take 1/2 to 1 tab po qhs prn insomnia 90 tablet 1    Trulicity 1.5 KK/5.2OG injection INJECT ONCE WEEKLY 6 mL 1    Valbenazine Tosylate (Ingrezza) 40 MG CAPS Take 40 mg by mouth 2 (two) times a day Take 1 cap po qhs 180 capsule 0     No current facility-administered medications for this visit. Allergies   Allergen Reactions    Pollen Extract Nasal Congestion    Tetanus Toxoid Swelling      Immunizations:     Immunization History   Administered Date(s) Administered    COVID-19 PFIZER VACCINE 0.3 ML IM 03/30/2021, 04/24/2021    INFLUENZA 12/11/2014, 10/09/2015, 12/21/2016, 09/28/2017    Influenza Quadrivalent, 6-35 Months IM 12/21/2016    Influenza, injectable, quadrivalent, preservative free 0.5 mL 11/06/2023    Influenza, recombinant, quadrivalent,injectable, preservative free 12/04/2018, 11/01/2019, 10/23/2020, 01/24/2022, 11/07/2022    Influenza, seasonal, injectable 12/11/2014, 10/09/2015, 09/28/2017    Pneumococcal Polysaccharide PPV23 12/04/2018      Health Maintenance:         Topic Date Due    Colorectal Cancer Screening  09/12/2028    HIV Screening  Completed    Hepatitis C Screening  Completed         Topic Date Due    Pneumococcal Vaccine: Pediatrics (0 to 5 Years) and At-Risk Patients (6 to 59 Years) (2 - PCV) 12/04/2019    COVID-19 Vaccine (3 - Pfizer series) 06/19/2021      Medicare Screening Tests and Risk Assessments:         Health Risk Assessment:   Patient rates overall health as fair.  Patient feels that their physical health rating is slightly better. Patient is very satisfied with their life. Eyesight was rated as slightly worse. Hearing was rated as same. Patient feels that their emotional and mental health rating is slightly better. Patients states they are never, rarely angry. Patient states they are sometimes unusually tired/fatigued. Pain experienced in the last 7 days has been some. Patient's pain rating has been 5/10. Patient states that he has experienced weight loss or gain in last 6 months. Depression Screening:   PHQ-2 Score: 0      Fall Risk Screening: In the past year, patient has experienced: history of falling in past year    Number of falls: 2 or more  Injured during fall?: No    Feels unsteady when standing or walking?: Yes    Worried about falling?: No      Home Safety:  Patient does not have trouble with stairs inside or outside of their home. Patient has working smoke alarms and has working carbon monoxide detector. Home safety hazards include: none. Nutrition:   Current diet is Regular. Medications:   Patient is not currently taking any over-the-counter supplements. Patient is able to manage medications. Activities of Daily Living (ADLs)/Instrumental Activities of Daily Living (IADLs):   Walk and transfer into and out of bed and chair?: Yes  Dress and groom yourself?: Yes    Bathe or shower yourself?: Yes    Feed yourself?  Yes  Do your laundry/housekeeping?: Yes  Manage your money, pay your bills and track your expenses?: No  Make your own meals?: Yes    Do your own shopping?: Yes    Previous Hospitalizations:   Any hospitalizations or ED visits within the last 12 months?: No      PREVENTIVE SCREENINGS      Cardiovascular Screening:    General: Screening Not Indicated and History Lipid Disorder      Diabetes Screening:     General: Screening Not Indicated and History Diabetes      Colorectal Cancer Screening:     General: Screening Current      Abdominal Aortic Aneurysm (AAA) Screening:    Risk factors include: tobacco use        Lung Cancer Screening:     General: Screening Not Indicated      Hepatitis C Screening:    General: Screening Current    Screening, Brief Intervention, and Referral to Treatment (SBIRT)    Screening  Typical number of drinks in a day: 0  Typical number of drinks in a week: 0  Interpretation: Low risk drinking behavior. Single Item Drug Screening:  How often have you used an illegal drug (including marijuana) or a prescription medication for non-medical reasons in the past year? never    Single Item Drug Screen Score: 0  Interpretation: Negative screen for possible drug use disorder    No results found. Physical Exam:     BP (!) 171/100 (BP Location: Right arm, Patient Position: Sitting, Cuff Size: Large)   Pulse 99   Temp 98.1 °F (36.7 °C) (Temporal)   Ht 5' 7" (1.702 m)   Wt 128 kg (282 lb)   BMI 44.17 kg/m²     Physical Exam  Constitutional:       Appearance: Normal appearance. HENT:      Head: Normocephalic and atraumatic. Eyes:      Extraocular Movements: Extraocular movements intact. Cardiovascular:      Rate and Rhythm: Normal rate and regular rhythm. Pulses: Normal pulses. Dorsalis pedis pulses are 2+ on the right side and 2+ on the left side. Posterior tibial pulses are 2+ on the right side and 2+ on the left side. Heart sounds: No murmur heard. Pulmonary:      Effort: Pulmonary effort is normal. No respiratory distress. Breath sounds: Normal breath sounds. No wheezing. Abdominal:      General: Abdomen is flat. There is no distension. Palpations: Abdomen is soft. Tenderness: There is no abdominal tenderness. Musculoskeletal:         General: No swelling or tenderness. Cervical back: Normal range of motion and neck supple. Feet:      Right foot:      Skin integrity: Dry skin present. Left foot:      Skin integrity: Dry skin present. Skin:     General: Skin is warm and dry. Neurological:      General: No focal deficit present. Mental Status: He is alert and oriented to person, place, and time.    Psychiatric:         Mood and Affect: Mood normal.         Behavior: Behavior normal.          Chris Brewer DO  PGY-1  Internal [address] [facility]

## 2023-11-29 ENCOUNTER — OFFICE VISIT (OUTPATIENT)
Dept: PSYCHIATRY | Facility: CLINIC | Age: 58
End: 2023-11-29
Payer: MEDICARE

## 2023-11-29 DIAGNOSIS — F41.1 GENERALIZED ANXIETY DISORDER: Primary | ICD-10-CM

## 2023-11-29 DIAGNOSIS — F41.0 PANIC ATTACKS: ICD-10-CM

## 2023-11-29 DIAGNOSIS — G47.00 INSOMNIA, UNSPECIFIED TYPE: ICD-10-CM

## 2023-11-29 DIAGNOSIS — G24.01 TARDIVE DYSKINESIA: ICD-10-CM

## 2023-11-29 DIAGNOSIS — Z63.4 BEREAVEMENT: ICD-10-CM

## 2023-11-29 DIAGNOSIS — F31.5 BIPOLAR DISORDER, CURRENT EPISODE DEPRESSED, SEVERE, WITH PSYCHOTIC FEATURES (HCC): ICD-10-CM

## 2023-11-29 PROCEDURE — 99213 OFFICE O/P EST LOW 20 MIN: CPT | Performed by: PHYSICIAN ASSISTANT

## 2023-11-29 RX ORDER — OLANZAPINE 10 MG/1
10 TABLET ORAL
Qty: 90 TABLET | Refills: 0 | Status: SHIPPED | OUTPATIENT
Start: 2023-11-29

## 2023-11-29 RX ORDER — TRAZODONE HYDROCHLORIDE 50 MG/1
TABLET ORAL
Qty: 90 TABLET | Refills: 0 | Status: SHIPPED | OUTPATIENT
Start: 2023-11-29

## 2023-11-29 RX ORDER — BUPROPION HYDROCHLORIDE 100 MG/1
100 TABLET ORAL 2 TIMES DAILY
Qty: 180 TABLET | Refills: 0 | Status: SHIPPED | OUTPATIENT
Start: 2023-11-29

## 2023-11-29 RX ORDER — BUSPIRONE HYDROCHLORIDE 15 MG/1
TABLET ORAL
Qty: 180 TABLET | Refills: 0 | Status: SHIPPED | OUTPATIENT
Start: 2023-11-29

## 2023-11-29 SDOH — SOCIAL STABILITY - SOCIAL INSECURITY: DISSAPEARANCE AND DEATH OF FAMILY MEMBER: Z63.4

## 2023-12-01 ENCOUNTER — TELEPHONE (OUTPATIENT)
Dept: INTERNAL MEDICINE CLINIC | Facility: CLINIC | Age: 58
End: 2023-12-01

## 2023-12-01 ENCOUNTER — CLINICAL SUPPORT (OUTPATIENT)
Dept: INTERNAL MEDICINE CLINIC | Facility: CLINIC | Age: 58
End: 2023-12-01

## 2023-12-01 VITALS — HEART RATE: 98 BPM | DIASTOLIC BLOOD PRESSURE: 102 MMHG | SYSTOLIC BLOOD PRESSURE: 178 MMHG

## 2023-12-01 DIAGNOSIS — I10 HYPERTENSION, UNSPECIFIED TYPE: ICD-10-CM

## 2023-12-01 DIAGNOSIS — I10 HYPERTENSION, UNSPECIFIED TYPE: Primary | ICD-10-CM

## 2023-12-01 RX ORDER — AMLODIPINE BESYLATE AND BENAZEPRIL HYDROCHLORIDE 10; 20 MG/1; MG/1
1 CAPSULE ORAL DAILY
Qty: 30 CAPSULE | Refills: 0 | Status: SHIPPED | OUTPATIENT
Start: 2023-12-01 | End: 2023-12-04

## 2023-12-01 NOTE — TELEPHONE ENCOUNTER
Called pt twice, did not . Left voicemail stating I increased his BP medication and sent the new one to the pharmacy. Can someone please call him to remind him to  this medication and stop the one we gave him on Monday and to schedule a nursing visit for BP check for next week? Thank you so much.

## 2023-12-01 NOTE — PROGRESS NOTES
Patient seen on nurse schedule today for b/p check after starting on amlodipine-benazepril 5-10. Per patient he takes his b/p medication in the evening and has been compliant on medication. Patient denies any symptoms at this time. No chest, pain, no SOB, no blurred vision. Informed patient I will be informing provider and we will call back with any further recommendations or changes. B/p today after sitting for 5 minutes read as 178/102 with a pulse of 98. Please review and advise.

## 2023-12-04 ENCOUNTER — APPOINTMENT (OUTPATIENT)
Dept: LAB | Facility: CLINIC | Age: 58
End: 2023-12-04
Payer: MEDICARE

## 2023-12-04 DIAGNOSIS — D64.9 ANEMIA, UNSPECIFIED TYPE: ICD-10-CM

## 2023-12-04 DIAGNOSIS — N18.32 CHRONIC KIDNEY DISEASE (CKD) STAGE G3B/A3, MODERATELY DECREASED GLOMERULAR FILTRATION RATE (GFR) BETWEEN 30-44 ML/MIN/1.73 SQUARE METER AND ALBUMINURIA CREATININE RATIO GREATER THAN 300 MG/G (HCC): Primary | ICD-10-CM

## 2023-12-04 DIAGNOSIS — N18.32 CHRONIC KIDNEY DISEASE (CKD) STAGE G3B/A3, MODERATELY DECREASED GLOMERULAR FILTRATION RATE (GFR) BETWEEN 30-44 ML/MIN/1.73 SQUARE METER AND ALBUMINURIA CREATININE RATIO GREATER THAN 300 MG/G (HCC): ICD-10-CM

## 2023-12-04 LAB
PHOSPHATE SERPL-MCNC: 2.9 MG/DL (ref 2.7–4.5)
PTH-INTACT SERPL-MCNC: 77.3 PG/ML (ref 12–88)

## 2023-12-04 PROCEDURE — 84100 ASSAY OF PHOSPHORUS: CPT

## 2023-12-04 PROCEDURE — 83970 ASSAY OF PARATHORMONE: CPT

## 2023-12-04 RX ORDER — AMLODIPINE BESYLATE AND BENAZEPRIL HYDROCHLORIDE 10; 20 MG/1; MG/1
1 CAPSULE ORAL DAILY
Qty: 90 CAPSULE | Refills: 3 | Status: SHIPPED | OUTPATIENT
Start: 2023-12-04

## 2023-12-04 NOTE — TELEPHONE ENCOUNTER
Called patient to see if he received the message from Dr. Vj Mar, patient did not answer left a VM to call back.

## 2023-12-05 ENCOUNTER — OFFICE VISIT (OUTPATIENT)
Dept: NEPHROLOGY | Facility: CLINIC | Age: 58
End: 2023-12-05
Payer: MEDICARE

## 2023-12-05 DIAGNOSIS — N18.30 TYPE 2 DIABETES MELLITUS WITH STAGE 3 CHRONIC KIDNEY DISEASE AND HYPERTENSION (HCC): Primary | ICD-10-CM

## 2023-12-05 DIAGNOSIS — E66.01 CLASS 3 SEVERE OBESITY DUE TO EXCESS CALORIES WITH SERIOUS COMORBIDITY AND BODY MASS INDEX (BMI) OF 45.0 TO 49.9 IN ADULT (HCC): ICD-10-CM

## 2023-12-05 DIAGNOSIS — R80.1 PERSISTENT PROTEINURIA: ICD-10-CM

## 2023-12-05 DIAGNOSIS — E55.9 VITAMIN D DEFICIENCY: ICD-10-CM

## 2023-12-05 DIAGNOSIS — E11.22 TYPE 2 DIABETES MELLITUS WITH STAGE 3 CHRONIC KIDNEY DISEASE AND HYPERTENSION (HCC): Primary | ICD-10-CM

## 2023-12-05 DIAGNOSIS — I12.9 TYPE 2 DIABETES MELLITUS WITH STAGE 3 CHRONIC KIDNEY DISEASE AND HYPERTENSION (HCC): Primary | ICD-10-CM

## 2023-12-05 PROCEDURE — 99214 OFFICE O/P EST MOD 30 MIN: CPT | Performed by: INTERNAL MEDICINE

## 2023-12-05 RX ORDER — ERGOCALCIFEROL 1.25 MG/1
50000 CAPSULE ORAL WEEKLY
Qty: 12 CAPSULE | Refills: 0 | Status: CANCELLED | OUTPATIENT
Start: 2023-12-05

## 2023-12-05 RX ORDER — BENZTROPINE MESYLATE 2 MG/1
2 TABLET ORAL 2 TIMES DAILY
COMMUNITY

## 2023-12-05 NOTE — PATIENT INSTRUCTIONS
1. )  Low 2 g sodium diet    2.)  Monitor weights at home    3.)  Avoid NSAIDs (ibuprofen, Motrin, Advil, Aleve, naproxen)    4.)  Monitor blood pressure at home, call if blood pressure greater than 150/90 persistently    5.) I will plan to discuss all results including blood work, and/or imaging at our next visit, unless there is an urgent indication, in which case I will call you earlier. If you have any questions or concerns about your results, please feel free to call our office.     6.) Start Vitamin D 5,000 units daily    7.) let me know what new BP medication your on

## 2023-12-05 NOTE — PROGRESS NOTES
NEPHROLOGY OFFICE VISIT   Betina Ames 63 y.o. male MRN: [id number]  12/5/2023    Reason for Visit: Chronic kidney disease    History of Present Illness (HPI):    I had the pleasure of seeing Violeta Castellanos today in the renal clinic for the continued management of chronic kidney disease. Since our last visit, there has been no ER visits or hospitilizations. He currently has no complaints at this time and is feeling well. Patient denies any chest pain, shortness of breath and swelling. The last blood work was done on yesterday, which we have reviewed together. There are some confusion in regards to his blood pressure medication. In the past he was on lisinopril/hydrochlorothiazide at her last visit back in March his blood pressure was well-controlled and his proteinuria was controlled. At some point he was off lisinopril hydrochlorothiazide for some reason and his proteinuria has now worsened based on the blood work from yesterday and his blood pressure has been high. He was started on amlodipine-benazepril by the medicine team 5-10 mg and supposedly picking up a new blood pressure medication today but does not know the name. I asked him to call me with the name of it. Ideally I would like him to go back on lisinopril-hydrochlorothiazide. But I will see what medication he started on and then have a better idea of what he is taking overall. He restarted the ACE inhibitor component the past 1 week    ASSESSMENT and PLAN:    1.) Chronic kidney disease stage IIIA with microalbuminuria  -baseline creatinine appears to be between 1.5-2 mg/dL (high 1's usually). Cystatin C 1.5 (GFR 57 cc/min)  -Renal function remained stable creatinine at 1.40 mg/dL  -renal ultrasound shows the right kidney measuring 10.8 cm in the left kidney measuring 10.2 cm.   Right kidney has an upper pole cyst measuring about 1.2 cm.   -microalbumin/creatinine ratio has worsened to 3.9 g/g  -presumed to be secondary to diabetic nephropathy due to evidence of microalbuminuria as well as it being poorly controlled for many years, with a component of hypertension and possible obesity. No family history of kidney disease. Possible APO-L1 associated nephropathy.  -blood pressure and diabetic controlled  -educated on diet and weight loss.  -Avoid NSAIDs  -Agree with restarting ACE inhibitor     2.) Proteinuria  -- presumed to be secondary to diabetic nephropathy possible obesity related  -- 24 hour urine protein or urine protein creatinine ratio:  Microalbumin/creatinine ratio has worsened to 3.9 g/g, after being less than 500 mg consistently while on an ACE inhibitor. This subsequently increased likely related to poor blood pressure control along with being off an ACE inhibitor  -- Current Blood Pressure: 140/78  -- current anti proteinuric medications:  Lisinopril/hydrochlorothiazide  -- Interventions:  Weight loss, low 2 g sodium diet, diabetic and blood pressure control aiming for blood pressure less than 130/80, continue ACE-inhibitor, avoid NSAIDs  -- General Recommendations:  RAAS Blockade with high dose ARB or ACEI for target blood pressure < 130/80 as tolerated, with spironolactone as a good adjunct for anti proteinuric effect. Management of hypervolemia for blood pressure control as needed. Avoid combination ACEI + ARB, due to high adverse effects (ONTARGET study)  Direct Renin Inhibitor + ACEI or ARB has FDA black box warning for patients with diabetes and GFR < 60 cc/min due to risk for non fatal stroke, renal complications, hyperkalemia and hypotension (ALTITUDE study)  Moderate dietary protein restriction 0.8 gm/kg  Recommend statin therapy if no contraindications.    Recommend 1, 25 vitamin-D such as Paricalcitol if appropropriate (VITAL study)     3.) Diabetes mellitus type 2  -hemoglobin A1c: 6.5 (A1C values may be falsely elevated or decreased in those with CKD, assay method should be certified by National glycohemoglobin standardization Program). Target A1C < 7  -current medications:  Trulicity  -proteinuria:  Mild  -retinopathy:  Yes  -neuropathy:  Yes  -please follow with an ophthalmologist and podiatrist every year  -continued self monitoring of blood glucose at home  -Treatment Management in CKD Recommendations:   Metformin:  Avoid if creatinine clearance is less than 30 cc/min (concern for lactic acidosis)  Sodium-Glucose Cotransporter-2 (SGLT2) Inhibitors: May see an acute drop in the eGFR initially when starting the medication but then a stabilization of the renal function, with slower loss of renal function as compared to placebo. Relative risk of end-stage renal disease, doubling of serum creatinine or death from renal causes were also found to be lower as compared to placebo. (CREDENCE). Would recommend starting at 100 mg daily, I would avoid use in patients with eGFR < 30 cc/min. If no contraindications exist I would recommend this medication added to the diabetic regiment  Sulfonylureas:  Preferred short-acting (glipizide, glimepiride, repaglinide), relatively safe in patients with non dialysis CKD  Thiazolidinedones/Alpha-Gluosidase inhibitors/Dipeptidyl peptidase-4 inhibitors:  Generally not considered first-line agents in CKD (limited data in long-term safety and efficacy)  Insulin:  Starting dose may need to be lower than what would ordinarily be used, as there is a decrease metabolism of insulin (no dose adjustment if the GFR > 50 mL/min, dose should be reduced to 75% of baseline if GFR 10-50 mL/min, and 50% baseline if GFR < 10 mL/min)     4.) Hypertension  -- with underlying chronic kidney disease and proteinuria  --Blood pressure at target and asymptomatic  -- target blood pressure less than 130/80  --Pressure above target but improving  -- low 2 g sodium diet  -- weight loss and exercise program  --Has not been on lisinopril hydrochlorothiazide for some time not sure why it got stopped.   It was started on amlodipine-benazepril instead  --Ideally I would have liked him to go back to lisinopril/hydrochlorothiazide  --Planning to  a new blood pressure medication we will see what this is and have a better sense of what his regiment now looks like     5.) Anemia of chronic kidney disease--resolved  --Hemoglobin is currently normal     6.) Obesity, class III  -- BMI 44.17, this is trending down  --Recommend decreasing portion sizes, encouraging healthy choices of fruits and vegetables, consuming healthier snacks and moderation in carbohydrate intake. Exercise recommendations; 3-5 times a week, the American Heart Association recommends 150 minutes a week moderate exercise  --Strongly recommend evaluation by nutritionist  --Overweight and obesity have been associated with increased mortality and morbidity. Associated with a reduction in life expectancy, associated with increased all cause and cardiovascular mortality  --Metabolic risks, including increased risk of diabetes type 2, insulin resistance with hyperinsulinemia (weight loss of 5 to 7% associated with a decreased risk of type 2 diabetes among individuals with prediabetes and weight loss of 15% can lead to dramatic improvement of diabetes in nearly half of people). Dyslipidemia, hypertension and heart disease are all increased in patients with obesity. Along with increased risk of stroke increased risk of multiple cancer types. Can also be associated with increased renal dysfunction, strongest risk factor for new onset chronic kidney disease. There is also a degree of compensatory hyperfiltration to meet high in the metabolic demands of increased body weight. This can also result in increased increased risk for nephrolithiasis. 7.) Obstructive sleep apnea  --continued use of CPAP  -- Weight loss exercise and diet    8.)  Vitamin D deficiency  -- Vitamin D 25-hydroxy level less than 10.   Ideally I would like him to be on ergocalciferol 50,000 units weekly but he needs to take over-the-counter vitamin D based on his insurance. Gave him recommendations to take vitamin D 5000 units daily can titrate up further      PATIENT INSTRUCTIONS:    Patient Instructions   1.)  Low 2 g sodium diet    2.)  Monitor weights at home    3.)  Avoid NSAIDs (ibuprofen, Motrin, Advil, Aleve, naproxen)    4.)  Monitor blood pressure at home, call if blood pressure greater than 150/90 persistently    5.) I will plan to discuss all results including blood work, and/or imaging at our next visit, unless there is an urgent indication, in which case I will call you earlier. If you have any questions or concerns about your results, please feel free to call our office. 6.) Start Vitamin D 5,000 units daily    7.) let me know what new BP medication your on        Orders Placed This Encounter   Procedures    Albumin / creatinine urine ratio     Standing Status:   Future     Standing Expiration Date:   12/5/2024    Albumin / creatinine urine ratio     Standing Status:   Future     Standing Expiration Date:   12/5/2024    Comprehensive metabolic panel     This is a patient instruction: Patient fasting for 8 hours or longer recommended. Standing Status:   Future     Standing Expiration Date:   12/5/2024    Hemoglobin A1C     Standing Status:   Future     Standing Expiration Date:   12/5/2024    CBC     Standing Status:   Future     Standing Expiration Date:   12/5/2024       OBJECTIVE:  Current Weight:    There were no vitals filed for this visit. There is no height or weight on file to calculate BMI. REVIEW OF SYSTEMS:    Review of Systems   Constitutional:  Negative for activity change and fever. Respiratory:  Negative for cough, chest tightness, shortness of breath and wheezing. Cardiovascular:  Negative for chest pain and leg swelling. Gastrointestinal:  Negative for abdominal pain, diarrhea, nausea and vomiting. Endocrine: Negative for polyuria.    Genitourinary: Negative for difficulty urinating, dysuria, flank pain, frequency and urgency. Skin:  Negative for rash. Neurological:  Negative for dizziness, syncope, light-headedness and headaches. All other systems reviewed and are negative. PHYSICAL EXAM:      Physical Exam  Vitals and nursing note reviewed. Constitutional:       General: He is not in acute distress. Appearance: He is well-developed. HENT:      Head: Normocephalic and atraumatic. Eyes:      General: No scleral icterus. Conjunctiva/sclera: Conjunctivae normal.      Pupils: Pupils are equal, round, and reactive to light. Cardiovascular:      Rate and Rhythm: Normal rate and regular rhythm. Heart sounds: S1 normal and S2 normal. No murmur heard. No friction rub. No gallop. Pulmonary:      Effort: Pulmonary effort is normal. No respiratory distress. Breath sounds: Normal breath sounds. No wheezing or rales. Abdominal:      General: Bowel sounds are normal.      Palpations: Abdomen is soft. Tenderness: There is no abdominal tenderness. There is no rebound. Musculoskeletal:         General: Normal range of motion. Cervical back: Normal range of motion and neck supple. Skin:     Findings: No rash. Neurological:      Mental Status: He is alert and oriented to person, place, and time.    Psychiatric:         Behavior: Behavior normal.         Medications:    Current Outpatient Medications:     amLODIPine-benazepril (LOTREL) 10-20 MG per capsule, TAKE 1 CAPSULE BY MOUTH DAILY (Patient taking differently: Take 1 capsule by mouth daily 5/10 daily), Disp: 90 capsule, Rfl: 3    ammonium lactate (LAC-HYDRIN) 12 % cream, Apply topically as needed for dry skin, Disp: 385 g, Rfl: 0    atorvastatin (LIPITOR) 20 mg tablet, Take 1 tablet (20 mg total) by mouth daily, Disp: 90 tablet, Rfl: 3    benztropine (COGENTIN) 2 mg tablet, Take 2 mg by mouth 2 (two) times a day, Disp: , Rfl:     Blood Glucose Monitoring Suppl (FREESTYLE LITE) ANTONIA, by Does not apply route 3 (three) times a day, Disp: 1 each, Rfl: 0    Blood Glucose Monitoring Suppl (FreeStyle Lite) w/Device KIT, USE AS INSTRUCTED 4 TIMES A DAY, Disp: 1 kit, Rfl: 0    clonazePAM (KlonoPIN) 1 mg tablet, TAKE 1 TABLET BY MOUTH EVERY DAY AS NEEDED FOR ANXIETY OR PANIC ATTACKS, Disp: 30 tablet, Rfl: 5    Continuous Blood Gluc  (FreeStyle Clarence 14 Day Lena) ANTONIA, Use  to check BG at least 4 times a day, Disp: 1 each, Rfl: 0    Continuous Blood Gluc Sensor (FreeStyle Clarence 14 Day Sensor) MISC, Apply sensor every 14 days to check BG at least 4 times a day, Disp: 2 each, Rfl: 5    FREESTYLE LITE test strip, CHECK BLOOD SUGARS FOUR TIMES DAILY, Disp: 400 strip, Rfl: 3    glucose monitoring kit (FREESTYLE) monitoring kit, Use 1 each 4 (four) times a day Fine to substitute other brand if not covered by insurance, Disp: 1 each, Rfl: 0    Insulin Pen Needle 31G X 8 MM MISC, Check sugars three times a day, Disp: 100 each, Rfl: 1    INSULIN SYRINGE .5CC/29G 29G X 1/2" 0.5 ML MISC, Use, Disp: , Rfl:     Lancets (FREESTYLE) lancets, USE THREE TIMES DAILY AS DIRECTED, Disp: 300 each, Rfl: 0    omeprazole (PriLOSEC) 20 mg delayed release capsule, TAKE 1 CAPSULE(20 MG) BY MOUTH DAILY, Disp: 90 capsule, Rfl: 3    traZODone (DESYREL) 50 mg tablet, Take 1/2 to 1 tab po qhs prn insomnia, Disp: 90 tablet, Rfl: 0    Trulicity 1.5 LF/7.9HA injection, INJECT ONCE WEEKLY, Disp: 6 mL, Rfl: 1    ammonium lactate (LAC-HYDRIN) 12 % cream, Apply topically as needed for dry skin (Patient not taking: Reported on 2/24/2023), Disp: 385 g, Rfl: 0    buPROPion (WELLBUTRIN) 100 mg tablet, Take 1 tablet (100 mg total) by mouth 2 (two) times a day Take bid--(once in the AM and once at 12 noon) (Patient not taking: Reported on 12/5/2023), Disp: 180 tablet, Rfl: 0    busPIRone (BUSPAR) 15 mg tablet, Take 1/2 - 1 tab po qd-bid (Patient not taking: Reported on 12/5/2023), Disp: 180 tablet, Rfl: 0 OLANZapine (ZyPREXA) 10 mg tablet, Take 1 tablet (10 mg total) by mouth daily at bedtime (Patient not taking: Reported on 12/5/2023), Disp: 90 tablet, Rfl: 0    polyethylene glycol (GOLYTELY) 4000 mL solution, Take 4,000 mL by mouth once for 1 dose (Patient not taking: Reported on 12/5/2023), Disp: 4000 mL, Rfl: 0    Laboratory Results:  Results from last 7 days   Lab Units 12/04/23  1000   WBC Thousand/uL 4.64   HEMOGLOBIN g/dL 14.1   HEMATOCRIT % 42.7   PLATELETS Thousands/uL 185   POTASSIUM mmol/L 3.7   CHLORIDE mmol/L 104   CO2 mmol/L 32   BUN mg/dL 8   CREATININE mg/dL 1.48*   CALCIUM mg/dL 8.4   PHOSPHORUS mg/dL 2.9       Results for orders placed or performed in visit on 12/04/23   PTH, intact   Result Value Ref Range    PTH 77.3 12.0 - 88.0 pg/mL   Phosphorus   Result Value Ref Range    Phosphorus 2.9 2.7 - 4.5 mg/dL

## 2023-12-07 NOTE — TELEPHONE ENCOUNTER
Called and spoke to patient, patient made aware as per note. Patient understood and had no questions at this time. Patient states his wife is on dialysis at the moment and is not sure what their schedule looks like at the moment due to dialysis. I advise patient as soon as he verifies the dates and time he can do to give our office a call back and we can help accommodate his needs.

## 2023-12-08 ENCOUNTER — ANESTHESIA EVENT (OUTPATIENT)
Dept: ANESTHESIOLOGY | Facility: HOSPITAL | Age: 58
End: 2023-12-08

## 2023-12-08 ENCOUNTER — ANESTHESIA (OUTPATIENT)
Dept: ANESTHESIOLOGY | Facility: HOSPITAL | Age: 58
End: 2023-12-08

## 2023-12-14 ENCOUNTER — TELEPHONE (OUTPATIENT)
Dept: PSYCHIATRY | Facility: CLINIC | Age: 58
End: 2023-12-14

## 2023-12-14 NOTE — TELEPHONE ENCOUNTER
Patient is calling regarding cancelling an appointment.     Date/Time: 12/14 @ 2pm    Reason: patient conflict    Patient was rescheduled: YES [] NO [x]  If yes, when was Patient reschedule for:     Patient requesting call back to reschedule: YES [x] NO []    Added to cx wait list for another appt this month after 10 am

## 2023-12-20 ENCOUNTER — TELEPHONE (OUTPATIENT)
Dept: PSYCHIATRY | Facility: CLINIC | Age: 58
End: 2023-12-20

## 2023-12-20 ENCOUNTER — CLINICAL SUPPORT (OUTPATIENT)
Dept: INTERNAL MEDICINE CLINIC | Facility: CLINIC | Age: 58
End: 2023-12-20

## 2023-12-20 VITALS — SYSTOLIC BLOOD PRESSURE: 153 MMHG | HEART RATE: 88 BPM | DIASTOLIC BLOOD PRESSURE: 76 MMHG

## 2023-12-20 DIAGNOSIS — I10 HYPERTENSION, UNSPECIFIED TYPE: Primary | ICD-10-CM

## 2023-12-20 NOTE — TELEPHONE ENCOUNTER
Writer contacted patient to offer 12/21 appointment however patient was unavailable to schedule.Patient will remain on wait list.

## 2023-12-20 NOTE — PROGRESS NOTES
Patient came in today 12/20/23 as NV for blood pressure check.  Patient's blood pressure 153/76 pulse 88. Patient state he does have a headache that is new and denies chest pain and palpitations. Patient states he take his blood pressure medication Amlodipine - benazepril at night time but is not sure of the dosage he is taking.  I verbalize to Dr. Suero, per Dr. Suero patient can take a Tylenol for his headache and won't make any changes to his medication until patient can confirm his dosage he currently is taking.

## 2023-12-29 ENCOUNTER — TELEPHONE (OUTPATIENT)
Dept: PSYCHIATRY | Facility: CLINIC | Age: 58
End: 2023-12-29

## 2023-12-29 NOTE — TELEPHONE ENCOUNTER
Writer contacted patient offering 1/02 appointment however patient is unavailable to schedule. Next apt is 1/15.

## 2024-01-03 ENCOUNTER — TELEPHONE (OUTPATIENT)
Dept: PSYCHIATRY | Facility: CLINIC | Age: 59
End: 2024-01-03

## 2024-01-03 NOTE — TELEPHONE ENCOUNTER
Writer SHIRA offering available appointment times for today, 1/03 at either 10 am or 2 pm and 1/04@ 9 am. Please assist with scheduling upon return call.TY

## 2024-01-04 ENCOUNTER — TELEPHONE (OUTPATIENT)
Dept: PSYCHIATRY | Facility: CLINIC | Age: 59
End: 2024-01-04

## 2024-01-04 NOTE — TELEPHONE ENCOUNTER
Writer contacted patient to offer him same day appointment at 4 pm however patient has a prior commitment. Patient will remain on cancel list.

## 2024-01-09 NOTE — PSYCH
No Call No Show  No Charge        Tommie Taylor  no showed on 01/11/24  appointment. Staff will contact the patient to reschedule appointment.       Treatment Plan not due at this session

## 2024-01-11 ENCOUNTER — OFFICE VISIT (OUTPATIENT)
Dept: PSYCHIATRY | Facility: CLINIC | Age: 59
End: 2024-01-11

## 2024-01-11 DIAGNOSIS — Z91.199 NO-SHOW FOR APPOINTMENT: Primary | ICD-10-CM

## 2024-01-16 ENCOUNTER — TELEMEDICINE (OUTPATIENT)
Dept: BEHAVIORAL/MENTAL HEALTH CLINIC | Facility: CLINIC | Age: 59
End: 2024-01-16
Payer: MEDICARE

## 2024-01-16 ENCOUNTER — TELEPHONE (OUTPATIENT)
Dept: PSYCHIATRY | Facility: CLINIC | Age: 59
End: 2024-01-16

## 2024-01-16 DIAGNOSIS — F41.0 PANIC ATTACKS: ICD-10-CM

## 2024-01-16 DIAGNOSIS — G47.00 INSOMNIA, UNSPECIFIED TYPE: ICD-10-CM

## 2024-01-16 DIAGNOSIS — F31.76 BIPOLAR I DISORDER, MOST RECENT EPISODE DEPRESSED, IN FULL REMISSION (HCC): Primary | ICD-10-CM

## 2024-01-16 DIAGNOSIS — F41.1 GENERALIZED ANXIETY DISORDER: ICD-10-CM

## 2024-01-16 PROCEDURE — 90834 PSYTX W PT 45 MINUTES: CPT | Performed by: SOCIAL WORKER

## 2024-01-16 NOTE — PSYCH
"Behavioral Health Psychotherapy Progress Note    Psychotherapy Provided: Individual Psychotherapy     1. Bipolar I disorder, most recent episode depressed, in full remission (HCC)        2. Generalized anxiety disorder        3. Panic attacks        4. Insomnia, unspecified type            Goals addressed in session: Goal 1     DATA: Orlando barba he still has depression and anxiety at times but he felt it was under control. However recently they found out his jayy needs dialysis due to kidney failure and his chanell's father is causing problems again. We discussed him talking about his feelings and his fears and we discussed the boundaries he can draw with his chanell's father. We worked on conflict resolution and on communication strategies along with drawing boundaries.   During this session, this clinician used the following therapeutic modalities: Client-centered Therapy, Cognitive Behavioral Therapy, Mindfulness-based Strategies, and Supportive Psychotherapy    Substance Abuse was not addressed during this session. If the client is diagnosed with a co-occurring substance use disorder, please indicate any changes in the frequency or amount of use: n/a. Stage of change for addressing substance use diagnoses: No substance use/Not applicable    ASSESSMENT:  Tommie Taylor presents with a Anxious and Depressed mood.     his affect is anxious and depressed which is congruent, with his mood and the content of the session. The client has made progress on their goals.     Tommie Taylor presents with a none risk of suicide, none risk of self-harm, and none risk of harm to others.    For any risk assessment that surpasses a \"low\" rating, a safety plan must be developed.    A safety plan was indicated: no  If yes, describe in detail n/a    PLAN: Between sessions, Tommie Taylor will use mindfulness and CBT. At the next session, the therapist will use Client-centered Therapy, Cognitive Behavioral Therapy, " Mindfulness-based Strategies, and Supportive Psychotherapy to address issues and symptoms as they may arise. .    Behavioral Health Treatment Plan and Discharge Planning: Tommie Taylor is aware of and agrees to continue to work on their treatment plan. They have identified and are working toward their discharge goals. yes    Visit start and stop times:    01/16/24  Start Time: 1510  Stop Time: 1600  Total Visit Time: 50 minutes    Virtual Regular Visit    Verification of patient location:    Patient is located at Home in the following state in which I hold an active license [place]      Assessment/Plan:    Problem List Items Addressed This Visit          Other    Panic attacks    Insomnia    Generalized anxiety disorder     Other Visit Diagnoses       Bipolar I disorder, most recent episode depressed, in full remission (HCC)    -  Primary            Goals addressed in session: Goal 1          Reason for visit is   Chief Complaint   Patient presents with    Virtual Regular Visit          Encounter provider Too Lockett    Provider located at [facility] [place]  [facility] [place]  [address]  [place] [id number] [phone]      Recent Visits  No visits were found meeting these conditions.  Showing recent visits within past 7 days and meeting all other requirements  Today's Visits  Date Type Provider Dept   01/16/24 Telemedicine [facility] [place]   Showing today's visits and meeting all other requirements  Future Appointments  No visits were found meeting these conditions.  Showing future appointments within next 150 days and meeting all other requirements       The patient was identified by name and date of birth. Tommie Taylor was informed that this is a telemedicine visit and that the visit is being conducted throughthe Epic Embedded platform. He agrees to proceed..  My office door was closed. No one else was  in the room.  He acknowledged consent and understanding of privacy and security of the video platform. The patient has agreed to participate and understands they can discontinue the visit at any time.    Patient is aware this is a billable service.     Subjective  Tommie Taylor is a 58 y.o. male  .      HPI     Past Medical History:   Diagnosis Date    ADHD, adult residual type     Anxiety     Anxiety     Bipolar 1 disorder (HCC)     Cataract     Left eye    CPAP (continuous positive airway pressure) dependence     Depression     Depression     Diabetes mellitus (HCC)     blood sugar 167 on admission    Equinus deformity of foot     GERD (gastroesophageal reflux disease)     Homicidal ideations     Hyperlipidemia     Hypertension     Microalbuminuria     Morbid obesity (HCC)     Neuropathy     Shortness of breath     Sleep apnea     CPAP at bedtime    Sleep apnea     Stroke (HCC)     Suicidal intent        Past Surgical History:   Procedure Laterality Date    CATARACT EXTRACTION      CATARACT EXTRACTION      COLONOSCOPY      HAND SURGERY Right     OTHER SURGICAL HISTORY      REPAIR OF SUPERFICIAL WOUND FACE    [place] ESOPHAGOGASTRODUODENOSCOPY TRANSORAL DIAGNOSTIC N/A 06/14/2018    Procedure: ESOPHAGOGASTRODUODENOSCOPY (EGD);  Surgeon: Yinka Maier MD;  Location: [facility];  Service: Gastroenterology    [place] ESOPHAGOGASTRODUODENOSCOPY TRANSORAL DIAGNOSTIC N/A 09/12/2018    Procedure: EGD AND COLONOSCOPY;  Surgeon: Yinka Maier MD;  Location: [facility];  Service: Gastroenterology       Current Outpatient Medications   Medication Sig Dispense Refill    amLODIPine-benazepril (LOTREL) 10-20 MG per capsule TAKE 1 CAPSULE BY MOUTH DAILY (Patient taking differently: Take 1 capsule by mouth daily 5/10 daily) 90 capsule 3    ammonium lactate (LAC-HYDRIN) 12 % cream Apply topically as needed for dry skin 385 g 0    ammonium lactate (LAC-HYDRIN) 12 % cream Apply topically as needed for dry skin (Patient not taking: Reported on  "2/24/2023) 385 g 0    atorvastatin (LIPITOR) 20 mg tablet Take 1 tablet (20 mg total) by mouth daily 90 tablet 3    benztropine (COGENTIN) 2 mg tablet Take 2 mg by mouth 2 (two) times a day      Blood Glucose Monitoring Suppl (FREESTYLE LITE) ANTONIA by Does not apply route 3 (three) times a day 1 each 0    Blood Glucose Monitoring Suppl (FreeStyle Lite) w/Device KIT USE AS INSTRUCTED 4 TIMES A DAY 1 kit 0    buPROPion (WELLBUTRIN) 100 mg tablet Take 1 tablet (100 mg total) by mouth 2 (two) times a day Take bid--(once in the AM and once at 12 noon) (Patient not taking: Reported on 12/5/2023) 180 tablet 0    busPIRone (BUSPAR) 15 mg tablet Take 1/2 - 1 tab po qd-bid (Patient not taking: Reported on 12/5/2023) 180 tablet 0    clonazePAM (KlonoPIN) 1 mg tablet TAKE 1 TABLET BY MOUTH EVERY DAY AS NEEDED FOR ANXIETY OR PANIC ATTACKS 30 tablet 5    Continuous Blood Gluc  (FreeStyle [place] 14 Day [place]) ANTONIA Use  to check BG at least 4 times a day 1 each 0    Continuous Blood Gluc Sensor (FreeStyle [place] 14 Day Sensor) MISC Apply sensor every 14 days to check BG at least 4 times a day 2 each 5    FREESTYLE LITE test strip CHECK BLOOD SUGARS FOUR TIMES DAILY 400 strip 3    glucose monitoring kit (FREESTYLE) monitoring kit Use 1 each 4 (four) times a day Fine to substitute other brand if not covered by insurance 1 each 0    Insulin Pen Needle 31G X 8 MM MISC Check sugars three times a day 100 each 1    INSULIN SYRINGE .5CC/29G 29G X 1/2\" 0.5 ML MISC Use      Lancets (FREESTYLE) lancets USE THREE TIMES DAILY AS DIRECTED 300 each 0    OLANZapine (ZyPREXA) 10 mg tablet Take 1 tablet (10 mg total) by mouth daily at bedtime (Patient not taking: Reported on 12/5/2023) 90 tablet 0    omeprazole (PriLOSEC) 20 mg delayed release capsule TAKE 1 CAPSULE(20 MG) BY MOUTH DAILY 90 capsule 3    polyethylene glycol (GOLYTELY) 4000 mL solution Take 4,000 mL by mouth once for 1 dose (Patient not taking: Reported on 12/5/2023) " 4000 mL 0    traZODone (DESYREL) 50 mg tablet Take 1/2 to 1 tab po qhs prn insomnia 90 tablet 0    Trulicity 1.5 MG/0.5ML injection INJECT ONCE WEEKLY 6 mL 1     No current facility-administered medications for this visit.        Allergies   Allergen Reactions    Pollen Extract Nasal Congestion    Tetanus Toxoid Swelling       Review of Systems    Video Exam    There were no vitals filed for this visit.    Physical Exam n/a

## 2024-02-01 DIAGNOSIS — Z79.4 TYPE 2 DIABETES MELLITUS WITH HYPERGLYCEMIA, WITH LONG-TERM CURRENT USE OF INSULIN (HCC): ICD-10-CM

## 2024-02-01 DIAGNOSIS — E11.65 TYPE 2 DIABETES MELLITUS WITH HYPERGLYCEMIA, WITH LONG-TERM CURRENT USE OF INSULIN (HCC): ICD-10-CM

## 2024-02-02 RX ORDER — DULAGLUTIDE 1.5 MG/.5ML
INJECTION, SOLUTION SUBCUTANEOUS
Qty: 6 ML | Refills: 1 | Status: SHIPPED | OUTPATIENT
Start: 2024-02-02

## 2024-02-15 ENCOUNTER — TELEPHONE (OUTPATIENT)
Dept: PSYCHIATRY | Facility: CLINIC | Age: 59
End: 2024-02-15

## 2024-02-15 NOTE — TELEPHONE ENCOUNTER
Patient contacted the office to schedule a follow up visit with provider. Patient is now scheduled for 2/21/24  at 2 pm in office.

## 2024-02-15 NOTE — TELEPHONE ENCOUNTER
Patient is calling regarding cancelling an appointment.    Date/Time: 3/14/24 at 1 pm    Reason: Conflicting Schedule     Patient was rescheduled: YES [x] NO []  If yes, when was Patient reschedule for:   3/27/24 at 2 pm, 4/22/24 at 3 pm, 5/22/24 at 1 pm, 6/19/24 at 1 pm    Patient requesting call back to reschedule: YES [] NO [x]
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
## 2024-02-21 ENCOUNTER — OFFICE VISIT (OUTPATIENT)
Dept: PSYCHIATRY | Facility: CLINIC | Age: 59
End: 2024-02-21
Payer: MEDICARE

## 2024-02-21 DIAGNOSIS — G47.00 INSOMNIA, UNSPECIFIED TYPE: ICD-10-CM

## 2024-02-21 DIAGNOSIS — G24.01 TARDIVE DYSKINESIA: ICD-10-CM

## 2024-02-21 DIAGNOSIS — F41.1 GENERALIZED ANXIETY DISORDER: ICD-10-CM

## 2024-02-21 DIAGNOSIS — F41.0 PANIC ATTACKS: ICD-10-CM

## 2024-02-21 DIAGNOSIS — F31.5 BIPOLAR DISORDER, CURRENT EPISODE DEPRESSED, SEVERE, WITH PSYCHOTIC FEATURES (HCC): Primary | ICD-10-CM

## 2024-02-21 PROCEDURE — 99214 OFFICE O/P EST MOD 30 MIN: CPT | Performed by: PHYSICIAN ASSISTANT

## 2024-02-21 RX ORDER — VALBENAZINE 40 MG/1
40 CAPSULE ORAL
Qty: 90 CAPSULE | Refills: 0 | Status: SHIPPED | OUTPATIENT
Start: 2024-02-21

## 2024-02-21 RX ORDER — BUPROPION HYDROCHLORIDE 100 MG/1
100 TABLET ORAL 2 TIMES DAILY
Qty: 180 TABLET | Refills: 0 | Status: SHIPPED | OUTPATIENT
Start: 2024-02-21

## 2024-02-21 RX ORDER — TRAZODONE HYDROCHLORIDE 50 MG/1
TABLET ORAL
Qty: 90 TABLET | Refills: 0 | Status: SHIPPED | OUTPATIENT
Start: 2024-02-21

## 2024-02-21 RX ORDER — CLONAZEPAM 1 MG/1
TABLET ORAL
Qty: 30 TABLET | Refills: 5 | Status: SHIPPED | OUTPATIENT
Start: 2024-02-21

## 2024-02-21 RX ORDER — OLANZAPINE 10 MG/1
10 TABLET ORAL
Qty: 90 TABLET | Refills: 0 | Status: SHIPPED | OUTPATIENT
Start: 2024-02-21

## 2024-02-21 NOTE — BH TREATMENT PLAN
"TREATMENT PLAN (Medication Management Only)        [facility] - [facility]CHIATR[facility]    Name/Date of Birth/MRN:  Tommie Taylor 58 y.o. 1965 MRN: [id number]  Date of Treatment Plan: February 21, 2024  Diagnosis/Diagnoses:   1. Bipolar disorder, current episode depressed, severe, with psychotic features (HCC)    2. Generalized anxiety disorder    3. Tardive dyskinesia    4. Panic attacks    5. Insomnia, unspecified type      Strengths/Personal Resources for Self-Care: \"Family\".  Area/Areas of need (in own words): \"Paper Safety Plan given for Pt to fill out and return.  \".  1. Long Term Goal:   Maintain mood stability and control of anxiety   Target Date:  3-6 months  Person/Persons responsible for completion of goal: Too Hanna LCSW (therapist)  2.  Short Term Objective (s) - How will we reach this goal?:   A.  Provider new recommended medication/dosage changes and/or continue medication(s):  Pt is having increased depression but no manic Sxs, anxiety is reduced and he has had no recent panic attacks.  He is having a resurgence of TD type Sxs and requests to restart Ingrezza. Tx options discussed and I will restart the Ingrezza at 40mg (which was at least partially helpful and had not caused SE for him in the past.)   His current mood is triggered due to wife's health circumstances and he feels stable on the present medications.  He appears so and since he is on many medicines and about to start Ingrezza again, the risks of increasing a mood medicine may outweigh the benefits at this time, hence I will not change his medicines.  Labwork reviewed and I advised he take a Vit D supplement and inform his Nephrologist and PCP.  Treatment plan done and Pt accepts the plan.   Restart Ingrezza 40mg (1) tab po qhs # 90 -- to [facility] in [place]  Continue:   Olanzapine 10mg (1) tab po qhs # 90  Clonazepam 1mg (1) tab po qd prn anxiety # 30 R5 "   Bupropion 100mg (1) tab po qAM and (1) tab q 12 noon # 180  Buspirone 15mg (1/2-1) tab po bid # 180  Trazodone 50mg (1/2-1) tab po qhs prn insomnia # 90  Melatonin 1mg prn insomnia-- Pt gets OTC    Gabapentin 300mg (1) tab po bid for DM neuropathy--per PCP     Vit D and Fe with Vit C--per Nephrology.  Pt states his insurance will not pay for Rx Vit D and he must get it OTC  Pt to get CMP, CBC, HgbA1C, and urine Alb/Cr ratio-- per Nephrology.   Pt to get CMP, Lipids, HgbA1C -- per PCP  Pt to also get O&P -- per GI    Pt continues counseling with Too BROWNW    Pt to f/u with Endocrinology    Return Wed 4/3/2024 at 4:30PM  Pt can only come on Mon, Wed, Fri henceforth due to accompanying his wife to dialysis on Tues and Thurs.  Pt to call sooner prn        Target Date:  3-6 months  Person/Persons Responsible for Completion of Goal: Tommie and Too Tam   Progress Towards Goals: stable, continuing treatment  Treatment Modality:  Medication mgt and psychotherapy  Review due 180 days from date of this plan: 6 months - 8/21/2024  Expected length of service: ongoing treatment unless revised  My Physician/PA/NP and I have developed this plan together and I agree to work on the goals and objectives. I understand the treatment goals that were developed for my treatment.  Electronic Signatures: on file (unless signed below)    Anel Dowd PA-C 02/21/24

## 2024-02-22 ENCOUNTER — DOCUMENTATION (OUTPATIENT)
Dept: NEPHROLOGY | Facility: CLINIC | Age: 59
End: 2024-02-22

## 2024-02-22 ENCOUNTER — TELEPHONE (OUTPATIENT)
Dept: NEPHROLOGY | Facility: CLINIC | Age: 59
End: 2024-02-22

## 2024-02-22 NOTE — TELEPHONE ENCOUNTER
Patient came into office because he was told by his psychiatrist that his vitamin D was low and she wanted nephrology to recommend how much he should take.  Patient had level done on 12/4.  Please advise.

## 2024-02-22 NOTE — PROGRESS NOTES
Pt stopped by the office asking to be prescribed Vitamin D (per his psychiatrist).  Explained to patient that since his insurance would not cover Ergo, Dr. No had advised back in December for him to take OTC  5,000 Units daily.

## 2024-02-27 ENCOUNTER — TELEPHONE (OUTPATIENT)
Dept: PSYCHIATRY | Facility: CLINIC | Age: 59
End: 2024-02-27

## 2024-02-27 NOTE — TELEPHONE ENCOUNTER
Writer contacted patient to offer 2/28 appointment but patient has conflict with another appointment.

## 2024-02-29 ENCOUNTER — TELEPHONE (OUTPATIENT)
Dept: OTHER | Facility: OTHER | Age: 59
End: 2024-02-29

## 2024-02-29 NOTE — TELEPHONE ENCOUNTER
Ena would like to speak to a clinician regarding adverse effects the pharmacy advised pt experienced while taking Ingrezza.

## 2024-03-04 ENCOUNTER — TELEPHONE (OUTPATIENT)
Dept: INTERNAL MEDICINE CLINIC | Facility: CLINIC | Age: 59
End: 2024-03-04

## 2024-03-04 NOTE — TELEPHONE ENCOUNTER
Patient called office that Trulicity is on hold per pharmacy.     Contacted [facility] pharmacy and Trulicity 1.5 mg is on hold/back order. Will not be available until end of March/Early April.     Pharmacist stated they have been recommending 0.75 mg and doubling dose to equal the 1.5 mg dose.     Please review and advice.     Contacted patient at . Introduced self and role. Patient updated nurse contacted pharmacy directly and confirmed they do not have Trulicity 1.5 mg in stock. Date TBD. Patient does not have any medication at home. Patient updated reaching out to physician for alternative option.

## 2024-03-05 ENCOUNTER — TELEPHONE (OUTPATIENT)
Dept: PSYCHIATRY | Facility: CLINIC | Age: 59
End: 2024-03-05

## 2024-03-05 DIAGNOSIS — Z79.4 TYPE 2 DIABETES MELLITUS WITH HYPERGLYCEMIA, WITH LONG-TERM CURRENT USE OF INSULIN (HCC): ICD-10-CM

## 2024-03-05 DIAGNOSIS — E11.65 TYPE 2 DIABETES MELLITUS WITH HYPERGLYCEMIA, WITH LONG-TERM CURRENT USE OF INSULIN (HCC): ICD-10-CM

## 2024-03-05 RX ORDER — DULAGLUTIDE 1.5 MG/.5ML
0.75 INJECTION, SOLUTION SUBCUTANEOUS
Qty: 6 ML | Refills: 0 | Status: SHIPPED | OUTPATIENT
Start: 2024-03-05 | End: 2024-06-03

## 2024-03-05 NOTE — TELEPHONE ENCOUNTER
Left voice message with patient that his Trulicity was sent to pharmacy, 0.75 mg and to double the dose to make 1.5 mg. Encourage to call office with any other questions/concerns.

## 2024-03-05 NOTE — TELEPHONE ENCOUNTER
I have reordered Trulicity to pharmacy as 0.75 mg. Please instruct the patient to double the dose as recommended by pharmacy until the medication is back in stock.

## 2024-03-05 NOTE — TELEPHONE ENCOUNTER
Received message from [employer]   [phone], she asked for return call.  Please use reference #: [id number].    Did not say what the call was in reference to.

## 2024-03-08 NOTE — TELEPHONE ENCOUNTER
LM for Radha. Included reference number. Returning her call. Provided direct number for call back.

## 2024-03-11 ENCOUNTER — TELEPHONE (OUTPATIENT)
Dept: PSYCHIATRY | Facility: CLINIC | Age: 59
End: 2024-03-11

## 2024-03-11 NOTE — TELEPHONE ENCOUNTER
Received message from [employer]   [phone], she asked for return call.  Please use reference #: [id number].         She apologized for the phone tag. She is looking to give a report they had received from the specialty pharmacy. Reported that Tommie reported shortness of breath and heart palpitations.     [employer] [phone]  Case# [id number]

## 2024-03-11 NOTE — TELEPHONE ENCOUNTER
Called Tommie and left a VM:  got a message regarding the medication her receives from the specialty pharmacy that he may be having side effects; gave the direct number for nursing and requested he return the call.
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
## 2024-03-13 NOTE — TELEPHONE ENCOUNTER
Received a call from Theo at [facility] regarding possible side effects from Ingrezza. She was looking to have 2 questions answered.  1) Looking for causality, were heart palpitations and SOB related to Ingrezza   2)Would like treatment details for palpitations/SOB.  Information can be called to [phone].  Ref number [id number].    Follow up call made and spoke with Tommie. He reports feeling good and not having problems since Ingrezza was restarted and is at the dose of 40 mg.    He related the palpitations and SOB were 2 1/2 - 3 months ago and started about 2 weeks after Ingrezza had been increased to 80 mg. He said he had been coming to the office for an appointment with Vahe Lockett. He said Vahe had notified the nurse and then had gotten direction form Anel Dowd to stop the medication completely. Tommie said he did not go to an ED or PCP and after 2-3 days, he felt better and was back to himself.     Please review. Nursing can follow up with [facility] with Tommie' account and any additional information

## 2024-03-18 NOTE — TELEPHONE ENCOUNTER
Called to review feedback. Was not able to speak with staff, did not leave information after the recording.

## 2024-03-22 NOTE — TELEPHONE ENCOUNTER
Left message [facility] at # [phone], call REF # [id number].   Awaiting return call.

## 2024-03-27 ENCOUNTER — SOCIAL WORK (OUTPATIENT)
Dept: BEHAVIORAL/MENTAL HEALTH CLINIC | Facility: CLINIC | Age: 59
End: 2024-03-27

## 2024-03-27 DIAGNOSIS — F41.1 GENERALIZED ANXIETY DISORDER: ICD-10-CM

## 2024-03-27 DIAGNOSIS — F31.76 BIPOLAR I DISORDER, MOST RECENT EPISODE DEPRESSED, IN FULL REMISSION (HCC): Primary | ICD-10-CM

## 2024-03-27 DIAGNOSIS — G47.00 INSOMNIA, UNSPECIFIED TYPE: ICD-10-CM

## 2024-03-27 DIAGNOSIS — F41.0 PANIC ATTACKS: ICD-10-CM

## 2024-03-27 NOTE — PSYCH
Behavioral Health Psychotherapy Progress Note    Psychotherapy Provided: Individual Psychotherapy     1. Bipolar I disorder, most recent episode depressed, in full remission (HCC)        2. Generalized anxiety disorder        3. Panic attacks        4. Insomnia, unspecified type            Goals addressed in session: Goal 1     DATA: Orlando shared that his partner's dad who is a difficult man went to [place] to see one of his sons. Orlando shared he and Marie have enjoyed the time he has not been present and they have 1 more week where he will still be there. He discussed Marie his partner really doesn't talk to her two bothers and that she does talk with her two sisters but they really dont do anything together. Orlando who has been doing well admitted as of late he has been depressed and shared he actually became friends with his partners ex male partner and the man  about two months ago and Orlando shared although it may seem unusual he felt bad about the man's death. He also shared he feels guilt that he went to this man's  but not his own sister's . When asked why he didn't he shared he felt it would be a waste of time. He would not elaborate more than that. I provided support and strategies to cope.   During this session, this clinician used the following therapeutic modalities: Client-centered Therapy, Cognitive Behavioral Therapy, Mindfulness-based Strategies, and Supportive Psychotherapy    Substance Abuse was not addressed during this session. If the client is diagnosed with a co-occurring substance use disorder, please indicate any changes in the frequency or amount of use: n/a. Stage of change for addressing substance use diagnoses: No substance use/Not applicable    ASSESSMENT:  Tommie Taylor presents with a Anxious and Depressed mood.     his affect is Normal range and intensity and Tearful, which is congruent, with his mood and the content of the session. The client has made progress on their  "goals.     Tommie Taylor presents with a none risk of suicide, none risk of self-harm, and none risk of harm to others.    For any risk assessment that surpasses a \"low\" rating, a safety plan must be developed.    A safety plan was indicated: no  If yes, describe in detail n/a    PLAN: Between sessions, Tommie Taylor will use mindfulness and CBT. At the next session, the therapist will use Client-centered Therapy, Cognitive Behavioral Therapy, Mindfulness-based Strategies, and Supportive Psychotherapy to address issues and symptoms as they may arise. .    Behavioral Health Treatment Plan and Discharge Planning: Tommie Taylor is aware of and agrees to continue to work on their treatment plan. They have identified and are working toward their discharge goals. yes    Visit start and stop times:    03/27/24  Start Time: 1400  Stop Time: 1500  Total Visit Time: 60 minutes  "

## 2024-03-27 NOTE — BH TREATMENT PLAN
Outpatient Behavioral Health Psychotherapy Treatment Plan        Treatment Plan Tracking: The update was due 2/12/2024. However he was last here 1/16 and today was the first time back since 1/16 so we did it today.    Tommie Taylor  1965     Date of Initial Psychotherapy Assessment: 05/07/2018  Date of Current Treatment Plan: 03/27/24  Treatment Plan Target Date: 09/20/2024  Treatment Plan Expiration Date: 09/20/2024    Diagnosis:   1. Bipolar I disorder, most recent episode depressed, in full remission (HCC)        2. Generalized anxiety disorder        3. Panic attacks        4. Insomnia, unspecified type            Area(s) of Need: Please see below    Long Term Goal 1 (in the client's own words): I still need help managing my depression and my anxiety.     Stage of Change: Action    Target Date for completion: 09/20/2024     Anticipated therapeutic modalities: Mindfulness and CBT     People identified to complete this goal: myself with the help of my therapist      Objective 1: (identify the means of measuring success in meeting the objective): When and if my symptoms arise I will be able to manage them at a minimum level.       Objective 2: (identify the means of measuring success in meeting the objective):       Long Term Goal 2 (in the client's own words):     Stage of Change:     Target Date for completion:      Anticipated therapeutic modalities:      People identified to complete this goal:       Objective 1: (identify the means of measuring success in meeting the objective):       Objective 2: (identify the means of measuring success in meeting the objective):      Long Term Goal 3 (in the client's own words):     Stage of Change:     Target Date for completion:      Anticipated therapeutic modalities:      People identified to complete this goal:       Objective 1: (identify the means of measuring success in meeting the objective):       Objective 2: (identify the means of measuring success in  meeting the objective):      I am currently under the care of a [facility] psychiatric provider: yes    My [facility] psychiatric provider is: ANUEL Dowd    I am currently taking psychiatric medications: Yes, as prescribed    I feel that I will be ready for discharge from mental health care when I reach the following (measurable goal/objective): When I make the progress on my goals that I am happy with.     For children and adults who have a legal guardian:   Has there been any change to custody orders and/or guardianship status? NA. If yes, attach updated documentation.    I have created my Crisis Plan and have been offered a copy of this plan    Behavioral Health Treatment Plan [facility]: Diagnosis and Treatment Plan explained to Tommie Taylor acknowledges an understanding of their diagnosis. Tommie Taylor agrees to this treatment plan.    I have been offered a copy of this Treatment Plan. yes

## 2024-03-28 ENCOUNTER — TELEPHONE (OUTPATIENT)
Dept: NEPHROLOGY | Facility: CLINIC | Age: 59
End: 2024-03-28

## 2024-03-28 NOTE — TELEPHONE ENCOUNTER
Left message to schedule June follow up appointment with Dr. No in [place]. This is the 1st attempt.    If no appt available with Dr. No in June, patient is okay to see advanced practitioner, with appointment placed on wait list as well if outside of recall time frame.

## 2024-04-04 ENCOUNTER — TELEPHONE (OUTPATIENT)
Dept: PSYCHIATRY | Facility: CLINIC | Age: 59
End: 2024-04-04

## 2024-04-05 ENCOUNTER — TELEPHONE (OUTPATIENT)
Dept: PSYCHIATRY | Facility: CLINIC | Age: 59
End: 2024-04-05

## 2024-04-05 NOTE — TELEPHONE ENCOUNTER
Patient contacted the office to schedule a follow up visit with provider. Patient is now scheduled for 4/16/2024  at 6pm in office.

## 2024-04-08 ENCOUNTER — RA CDI HCC (OUTPATIENT)
Dept: OTHER | Facility: HOSPITAL | Age: 59
End: 2024-04-08

## 2024-04-08 ENCOUNTER — OFFICE VISIT (OUTPATIENT)
Dept: PODIATRY | Facility: CLINIC | Age: 59
End: 2024-04-08
Payer: MEDICARE

## 2024-04-08 VITALS
HEIGHT: 67 IN | BODY MASS INDEX: 43.95 KG/M2 | WEIGHT: 280 LBS | SYSTOLIC BLOOD PRESSURE: 150 MMHG | HEART RATE: 80 BPM | RESPIRATION RATE: 18 BRPM | DIASTOLIC BLOOD PRESSURE: 75 MMHG

## 2024-04-08 DIAGNOSIS — E11.65 TYPE 2 DIABETES MELLITUS WITH HYPERGLYCEMIA, UNSPECIFIED WHETHER LONG TERM INSULIN USE (HCC): Primary | ICD-10-CM

## 2024-04-08 PROCEDURE — 99213 OFFICE O/P EST LOW 20 MIN: CPT | Performed by: PODIATRIST

## 2024-04-08 NOTE — PROGRESS NOTES
HCC coding opportunities          Chart Reviewed number of suggestions sent to Provider: 2     Patients Insurance     Medicare Insurance: Medicare        E11.3213  E11.36

## 2024-04-08 NOTE — PROGRESS NOTES
"Assessment/Plan:    Discussed principles of diabetic footcare.  Vascular status is within normal limits and sensorium is intact.  Patient urged to refrain from walking barefoot.  All elongated toenails were trimmed.    No problem-specific Assessment & Plan notes found for this encounter.       Diagnoses and all orders for this visit:    Type 2 diabetes mellitus with hyperglycemia, unspecified whether long term insulin use (HCC)          Subjective:      Patient ID: Tommie Taylor is a 58 y.o. male.    HPI    Patient, a 58-year-old type II diabetic male presents for yearly diabetic foot exam.  He denies discomfort in his feet.  He denies numbness or tingling.  Chief complaint involves elongated toenails that he cannot trim.    I personally reviewed an A1c dated 10/9/2023.  It was 6.5.    I personally reviewed an A1c dated 7/5/2023.  It was 5.7.    The following portions of the patient's history were reviewed and updated as appropriate: allergies, current medications, past family history, past medical history, past social history, past surgical history, and problem list.    Review of Systems   Gastrointestinal:         GERD   Genitourinary:         Stage III renal disease   Neurological:  Negative for numbness.   Psychiatric/Behavioral:          Bipolar           Objective:      /75   Pulse 80   Resp 18   Ht 5' 7\" (1.702 m)   Wt 127 kg (280 lb)   BMI 43.85 kg/m²          Physical Exam  Cardiovascular:      Pulses: no weak pulses.           Dorsalis pedis pulses are 2+ on the right side and 2+ on the left side.        Posterior tibial pulses are 2+ on the right side and 2+ on the left side.   Feet:      Right foot:      Skin integrity: No ulcer, skin breakdown, erythema, warmth, callus or dry skin.      Left foot:      Skin integrity: No ulcer, skin breakdown, erythema, warmth, callus or dry skin.           Diabetic Foot Exam    Patient's shoes and socks removed.    Right Foot/Ankle   Right Foot " Inspection  Skin Exam: skin normal and skin intact. No dry skin, no warmth, no callus, no erythema, no maceration, no abnormal color, no pre-ulcer, no ulcer and no callus.     Toe Exam: ROM and strength within normal limits.     Sensory   Vibration: intact  Proprioception: intact  Monofilament testing: intact    Vascular  Capillary refills: < 3 seconds  The right DP pulse is 2+. The right PT pulse is 2+.     Right Toe  - Comprehensive Exam  Ecchymosis: none  Arch: normal  Hammertoes: absent  Claw Toes: absent  Swelling: none   Tenderness: none       Left Foot/Ankle  Left Foot Inspection  Skin Exam: skin normal and skin intact. No dry skin, no warmth, no erythema, no maceration, normal color, no pre-ulcer, no ulcer and no callus.     Toe Exam: ROM and strength within normal limits.     Sensory   Vibration: intact  Proprioception: intact  Monofilament testing: intact    Vascular  Capillary refills: < 3 seconds  The left DP pulse is 2+. The left PT pulse is 2+.     Left Toe  - Comprehensive Exam  Ecchymosis: none  Arch: normal  Hammertoes: absent  Claw toes: absent  Swelling: none   Tenderness: none       Assign Risk Category  No deformity present  No loss of protective sensation  No weak pulses  Risk: 0

## 2024-04-09 ENCOUNTER — TELEPHONE (OUTPATIENT)
Dept: PSYCHIATRY | Facility: CLINIC | Age: 59
End: 2024-04-09

## 2024-04-09 ENCOUNTER — TELEPHONE (OUTPATIENT)
Dept: MULTI SPECIALTY CLINIC | Facility: CLINIC | Age: 59
End: 2024-04-09

## 2024-04-09 NOTE — TELEPHONE ENCOUNTER
Patient called to verify appt. Writer confirmed appt for 4/16/24 at 6pm with provider. Patient understood.
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
## 2024-04-15 ENCOUNTER — OFFICE VISIT (OUTPATIENT)
Dept: INTERNAL MEDICINE CLINIC | Facility: CLINIC | Age: 59
End: 2024-04-15

## 2024-04-15 VITALS
WEIGHT: 315 LBS | TEMPERATURE: 97.2 F | DIASTOLIC BLOOD PRESSURE: 101 MMHG | HEART RATE: 93 BPM | BODY MASS INDEX: 49.81 KG/M2 | SYSTOLIC BLOOD PRESSURE: 180 MMHG

## 2024-04-15 DIAGNOSIS — I10 HYPERTENSION, UNSPECIFIED TYPE: ICD-10-CM

## 2024-04-15 DIAGNOSIS — E11.22 TYPE 2 DIABETES MELLITUS WITH STAGE 3 CHRONIC KIDNEY DISEASE AND HYPERTENSION (HCC): Primary | ICD-10-CM

## 2024-04-15 DIAGNOSIS — R41.3 MEMORY DEFICIT: ICD-10-CM

## 2024-04-15 DIAGNOSIS — E53.8 B12 DEFICIENCY: ICD-10-CM

## 2024-04-15 DIAGNOSIS — N18.30 TYPE 2 DIABETES MELLITUS WITH STAGE 3 CHRONIC KIDNEY DISEASE AND HYPERTENSION (HCC): Primary | ICD-10-CM

## 2024-04-15 DIAGNOSIS — R25.1 TREMOR: ICD-10-CM

## 2024-04-15 DIAGNOSIS — I12.9 TYPE 2 DIABETES MELLITUS WITH STAGE 3 CHRONIC KIDNEY DISEASE AND HYPERTENSION (HCC): Primary | ICD-10-CM

## 2024-04-15 DIAGNOSIS — G47.33 OSA (OBSTRUCTIVE SLEEP APNEA): ICD-10-CM

## 2024-04-15 DIAGNOSIS — E66.01 CLASS 3 SEVERE OBESITY DUE TO EXCESS CALORIES WITH SERIOUS COMORBIDITY AND BODY MASS INDEX (BMI) OF 45.0 TO 49.9 IN ADULT (HCC): ICD-10-CM

## 2024-04-15 DIAGNOSIS — N18.32 STAGE 3B CHRONIC KIDNEY DISEASE (HCC): ICD-10-CM

## 2024-04-15 DIAGNOSIS — E55.9 VITAMIN D DEFICIENCY: ICD-10-CM

## 2024-04-15 LAB — SL AMB POCT HEMOGLOBIN AIC: 6.8 (ref ?–6.5)

## 2024-04-15 PROCEDURE — 83036 HEMOGLOBIN GLYCOSYLATED A1C: CPT | Performed by: INTERNAL MEDICINE

## 2024-04-15 PROCEDURE — 99213 OFFICE O/P EST LOW 20 MIN: CPT | Performed by: INTERNAL MEDICINE

## 2024-04-15 RX ORDER — ERGOCALCIFEROL 1.25 MG/1
50000 CAPSULE ORAL WEEKLY
Qty: 6 CAPSULE | Refills: 0 | Status: SHIPPED | OUTPATIENT
Start: 2024-04-15 | End: 2024-05-21

## 2024-04-15 RX ORDER — CYANOCOBALAMIN (VITAMIN B-12) 500 MCG
500 TABLET ORAL DAILY
Qty: 30 TABLET | Refills: 2 | Status: SHIPPED | OUTPATIENT
Start: 2024-04-15 | End: 2024-07-14

## 2024-04-15 RX ORDER — AMLODIPINE AND BENAZEPRIL HYDROCHLORIDE 10; 40 MG/1; MG/1
1 CAPSULE ORAL DAILY
Qty: 90 CAPSULE | Refills: 0 | Status: SHIPPED | OUTPATIENT
Start: 2024-04-15 | End: 2024-07-14

## 2024-04-15 NOTE — PATIENT INSTRUCTIONS
Stop trulicity. Instead you will start a medication called Mounjaro. You will inject 2.5mg once per week for 4 weeks. Then inject 5mg once per week until we see you again.   We have increased your Lotrel (blood pressure medication) dosage. Keep a log of your blood pressures at home.   Stop taking your daily vitamin D for now. Instead take the one I have prescribed once per week for 6 weeks. Once you finish this resume taking your usual daily vitamin D.   Also start taking vitamin B12 1000mcg per day.   Please get the blood work done for the thyroid when you are able to.

## 2024-04-15 NOTE — ASSESSMENT & PLAN NOTE
Pt previously on trulicity but unable to get it for last 2 months due to inavailablity at pharmacy. Was previously on metformin but unable to tolerate due to side effects. Will switch to mounjaro today at 2.5 weekly for 4 weeks then increase to 5 weekly until next follow up. Placed referral to weight management although pt expresses that he does not want to go. He is not interested in changing his diet at this time although he understands that this greatly increases his risk of worsening of chronic diseases.  Will follow up in 6 weeks to see how he is tolerating Mounjaro.

## 2024-04-15 NOTE — ASSESSMENT & PLAN NOTE
Tremor at rest in bilateral hands and feet. First noticed about 1 year ago, getting worse (more often and more dramatic). No change with caffeine. No changes in sleep. Per chart review does have h/o tardive dyskinesia. Has cogentin listed in meds list but not sure if he is taking it currently. Encouraged him to discuss with psychiatry who he has an appt with tomorrow and to check if he is on cogentin currently. Based on this could consider further workup for tremor if worsening despite cogentin. Also started vitamin D and B12 supplementation as described in other problems.

## 2024-04-15 NOTE — ASSESSMENT & PLAN NOTE
Uncontrolled. BP today 180/101.   Current meds: amlodipine-benazepril 10-20mg daily.   Will increase combo Lotrel to 10-40mg daily. Encouraged to keep home BP log as well.

## 2024-04-15 NOTE — ASSESSMENT & PLAN NOTE
Occasionally disoriented and forgets what he is doing or where he is going. Misplaces objects, forgets if he has eaten or has taken his meds. MOCA performed in office today with pt scoring 18/30, indicating significant cognitive deficits. Specifically pt had difficulty with visuospatial/executive, memory, delayed recall, and abstraction. Memory issues are likely iso psychiatric disease on multiple psychiatric medications. Will continue to monitor. Started B12 and vitamin D supplementation today. Will also check TSH today as pt reports cold intolerance, has mildly dry skin on exam, and has h/o borderline hypothyroidism per chart review.

## 2024-04-15 NOTE — ASSESSMENT & PLAN NOTE
Level checked in December was <7. Pt states that he takes 2000U daily. Will start on 50,000U weekly for 6 weeks and then resume prior dosing.

## 2024-04-15 NOTE — PROGRESS NOTES
INTERNAL MEDICINE FOLLOW-UP OFFICE VISIT  [facility]  [address]  [place] [address]    NAME: Tommie Taylor  AGE: 58 y.o. SEX: male    DATE OF ENCOUNTER: 4/15/2024    Assessment and Plan     1. Type 2 diabetes mellitus with stage 3 chronic kidney disease and hypertension (HCC)  Assessment & Plan:    Lab Results   Component Value Date    HGBA1C 6.8 (A) 04/15/2024   Previously on trulicity, but has not been able to get it from the pharmacy in the last 2 months or so.   Will switch to mounjaro today in hopes that this will be more available. This is to aid mainly in weight loss as A1c is controlled.       Orders:  -     POCT hemoglobin A1c  -     tirzepatide (Mounjaro) 2.5 MG/0.5ML; Inject 0.5 mL (2.5 mg total) under the skin every 7 days for 28 days, THEN 1 mL (5 mg total) every 7 days.  -     Ambulatory Referral to Weight Management; Future    2. Hypertension, unspecified type  Assessment & Plan:  Uncontrolled. BP today 180/101.   Current meds: amlodipine-benazepril 10-20mg daily.   Will increase combo Lotrel to 10-40mg daily. Encouraged to keep home BP log as well.     Orders:  -     amLODIPine-benazepril (LOTREL) 10-40 MG per capsule; Take 1 capsule by mouth daily    3. Stage 3b chronic kidney disease (HCC)  Assessment & Plan:  Lab Results   Component Value Date    EGFR 51 12/04/2023    EGFR 52 (L) 12/04/2023    EGFR 53 07/31/2023    CREATININE 1.48 (H) 12/04/2023    CREATININE 1.43 (H) 07/31/2023    CREATININE 1.71 (H) 02/23/2023   Follows with nephro. See plans in HTN and DM for management.       4. Memory deficit  Assessment & Plan:  Occasionally disoriented and forgets what he is doing or where he is going. Misplaces objects, forgets if he has eaten or has taken his meds. MOCA performed in office today with pt scoring 18/30, indicating significant cognitive deficits. Specifically pt had difficulty with visuospatial/executive, memory, delayed recall,  and abstraction. Memory issues are likely iso psychiatric disease on multiple psychiatric medications. Will continue to monitor. Started B12 and vitamin D supplementation today. Will also check TSH today as pt reports cold intolerance, has mildly dry skin on exam, and has h/o borderline hypothyroidism per chart review.     Orders:  -     TSH, 3rd generation with Free T4 reflex; Future  -     ergocalciferol (VITAMIN D2) 50,000 units; Take 1 capsule (50,000 Units total) by mouth once a week for 6 doses  -     vitamin B-12 (CYANOCOBALAMIN) 500 MCG TABS; Take 1 tablet (500 mcg total) by mouth daily    5. Tremor  Assessment & Plan:  Tremor at rest in bilateral hands and feet. First noticed about 1 year ago, getting worse (more often and more dramatic). No change with caffeine. No changes in sleep. Per chart review does have h/o tardive dyskinesia. Has cogentin listed in meds list but not sure if he is taking it currently. Encouraged him to discuss with psychiatry who he has an appt with tomorrow and to check if he is on cogentin currently. Based on this could consider further workup for tremor if worsening despite cogentin. Also started vitamin D and B12 supplementation as described in other problems.     Orders:  -     TSH, 3rd generation with Free T4 reflex; Future  -     vitamin B-12 (CYANOCOBALAMIN) 500 MCG TABS; Take 1 tablet (500 mcg total) by mouth daily    6. DAISY (obstructive sleep apnea)  Assessment & Plan:  CPAP broken for last 3 days but is otherwise compliant. States that he will reach out to the appropriate office to get a new one.       7. Class 3 severe obesity due to excess calories with serious comorbidity and body mass index (BMI) of 45.0 to 49.9 in adult (HCC)  Assessment & Plan:  Pt previously on trulicity but unable to get it for last 2 months due to inavailablity at pharmacy. Was previously on metformin but unable to tolerate due to side effects. Will switch to mounjaro today at 2.5 weekly for 4  weeks then increase to 5 weekly until next follow up. Placed referral to weight management although pt expresses that he does not want to go. He is not interested in changing his diet at this time although he understands that this greatly increases his risk of worsening of chronic diseases.  Will follow up in 6 weeks to see how he is tolerating Mounjaro.     Orders:  -     tirzepatide (Mounjaro) 2.5 MG/0.5ML; Inject 0.5 mL (2.5 mg total) under the skin every 7 days for 28 days, THEN 1 mL (5 mg total) every 7 days.  -     Ambulatory Referral to Weight Management; Future    8. Vitamin D deficiency  Assessment & Plan:  Level checked in December was <7. Pt states that he takes 2000U daily. Will start on 50,000U weekly for 6 weeks and then resume prior dosing.     Orders:  -     ergocalciferol (VITAMIN D2) 50,000 units; Take 1 capsule (50,000 Units total) by mouth once a week for 6 doses    9. B12 deficiency  Assessment & Plan:  B12 level last year was 212. Will start supplementation today with 1000mcg daily.            Orders Placed This Encounter   Procedures    TSH, 3rd generation with Free T4 reflex    Ambulatory Referral to Weight Management    POCT hemoglobin A1c       - Counseling Documentation: patient was counseled regarding: instructions for management, risk factor reductions, prognosis, patient and family education, impressions, risks and benefits of treatment options, and importance of compliance with treatment  - Counseling Time: counseling time more than 50% of visit: 1 hour         BMI Counseling: Body mass index is 49.81 kg/m². The BMI is above normal. Nutrition recommendations include reducing portion sizes, decreasing overall calorie intake, 3-5 servings of fruits/vegetables daily, reducing fast food intake, consuming healthier snacks, and decreasing soda and/or juice intake. Exercise recommendations include exercising 3-5 times per week. Pharmacotherapy was ordered for patient to aid in weight loss.  Patient referred to weight management due to patient being morbidly obese.     Chief Complaint     Chief Complaint   Patient presents with    Diabetes     Diabetes follow up       History of Present Illness     HPI  59 yo male with PMH T2DM, HTN, DAISY, CKD with proteinuria, bipolar d/o, HLD. He presents to clinic today for follow up of chronic conditions. He overall feels well. Today c/o tremors and memory issues. Has noticed tremor in bilateral hands and feet over the last year progressively worsening. He also has noticed intermittent disorientation such as forgetting where he is at times and forgetting recent events such as if he ate or if he took his medication already.     The following portions of the patient's history were reviewed and updated as appropriate: allergies, current medications, past family history, past medical history, past social history, past surgical history and problem list.    Review of Systems     Review of Systems   Constitutional:  Negative for chills and fever.   Respiratory:  Negative for shortness of breath.    Cardiovascular:  Negative for chest pain.   Gastrointestinal:  Negative for abdominal pain.   Psychiatric/Behavioral:  Negative for behavioral problems and sleep disturbance.    All other systems reviewed and are negative.      All ROS negative unless otherwise stated in the HPI    Active Problem List     Patient Active Problem List   Diagnosis    Bipolar I disorder, severe, current or most recent episode depressed, with psychotic features (HCC)    Panic attacks    Type 2 diabetes mellitus with hyperglycemia (HCC)    Flat foot    Diabetic polyneuropathy (HCC)    Allergic rhinitis    GERD (gastroesophageal reflux disease)    Insomnia    Hiatal hernia    Lower esophageal ring ([place])    Generalized anxiety disorder    DAISY (obstructive sleep apnea)    Stage 3 chronic kidney disease (HCC)    Type 2 diabetes mellitus with stage 3 chronic kidney disease and hypertension (HCC)     Persistent proteinuria    Anemia, unspecified    Toenail deformity    Bereavement    Hyperlipidemia    Vitamin D deficiency    Class 3 severe obesity due to excess calories with serious comorbidity and body mass index (BMI) of 45.0 to 49.9 in adult (HCC)    Lightheaded    Loss of appetite    Unintended weight loss    Tardive dyskinesia    Hypertension    Memory deficit    Tremor    B12 deficiency       Objective     BP (!) 180/101 (BP Location: Left arm, Patient Position: Sitting, Cuff Size: Large)   Pulse 93   Temp (!) 97.2 °F (36.2 °C) (Temporal)   Wt (!) 144 kg (318 lb)   BMI 49.81 kg/m²     Physical Exam  Constitutional:       General: He is not in acute distress.     Appearance: He is obese. He is not ill-appearing.   HENT:      Head: Normocephalic and atraumatic.      Right Ear: External ear normal.      Left Ear: External ear normal.      Nose: Nose normal.      Mouth/Throat:      Pharynx: Oropharynx is clear.   Eyes:      Extraocular Movements: Extraocular movements intact.      Conjunctiva/sclera: Conjunctivae normal.   Cardiovascular:      Rate and Rhythm: Normal rate and regular rhythm.      Heart sounds: Normal heart sounds.   Pulmonary:      Effort: Pulmonary effort is normal. No respiratory distress.      Breath sounds: Normal breath sounds.   Abdominal:      Palpations: Abdomen is soft.   Musculoskeletal:         General: No swelling or deformity.      Right lower leg: No edema.      Left lower leg: No edema.   Skin:     General: Skin is warm and dry.      Findings: No rash.   Neurological:      Mental Status: He is alert and oriented to person, place, and time.   Psychiatric:         Mood and Affect: Mood normal.         Behavior: Behavior normal.           Current Medications     Current Outpatient Medications:     amLODIPine-benazepril (LOTREL) 10-40 MG per capsule, Take 1 capsule by mouth daily, Disp: 90 capsule, Rfl: 0    ergocalciferol (VITAMIN D2) 50,000 units, Take 1 capsule (50,000 Units  total) by mouth once a week for 6 doses, Disp: 6 capsule, Rfl: 0    tirzepatide (Mounjaro) 2.5 MG/0.5ML, Inject 0.5 mL (2.5 mg total) under the skin every 7 days for 28 days, THEN 1 mL (5 mg total) every 7 days., Disp: 2 mL, Rfl: 0    vitamin B-12 (CYANOCOBALAMIN) 500 MCG TABS, Take 1 tablet (500 mcg total) by mouth daily, Disp: 30 tablet, Rfl: 2    ammonium lactate (LAC-HYDRIN) 12 % cream, Apply topically as needed for dry skin, Disp: 385 g, Rfl: 0    ammonium lactate (LAC-HYDRIN) 12 % cream, Apply topically as needed for dry skin (Patient not taking: Reported on 2/24/2023), Disp: 385 g, Rfl: 0    atorvastatin (LIPITOR) 20 mg tablet, Take 1 tablet (20 mg total) by mouth daily, Disp: 90 tablet, Rfl: 3    benztropine (COGENTIN) 2 mg tablet, Take 2 mg by mouth 2 (two) times a day, Disp: , Rfl:     Blood Glucose Monitoring Suppl (FREESTYLE LITE) ANTONIA, by Does not apply route 3 (three) times a day, Disp: 1 each, Rfl: 0    Blood Glucose Monitoring Suppl (FreeStyle Lite) w/Device KIT, USE AS INSTRUCTED 4 TIMES A DAY, Disp: 1 kit, Rfl: 0    buPROPion (WELLBUTRIN) 100 mg tablet, Take 1 tablet (100 mg total) by mouth 2 (two) times a day Take bid--(once in the AM and once at 12 noon), Disp: 180 tablet, Rfl: 0    busPIRone (BUSPAR) 15 mg tablet, Take 1/2 - 1 tab po qd-bid (Patient not taking: Reported on 12/5/2023), Disp: 180 tablet, Rfl: 0    clonazePAM (KlonoPIN) 1 mg tablet, TAKE 1 TABLET BY MOUTH EVERY DAY AS NEEDED FOR ANXIETY OR PANIC ATTACKS, Disp: 30 tablet, Rfl: 5    Continuous Blood Gluc  (FreeStyle [place] 14 Day [place]) ANTONIA, Use  to check BG at least 4 times a day, Disp: 1 each, Rfl: 0    Continuous Blood Gluc Sensor (FreeStyle [place] 14 Day Sensor) MISC, Apply sensor every 14 days to check BG at least 4 times a day, Disp: 2 each, Rfl: 5    FREESTYLE LITE test strip, CHECK BLOOD SUGARS FOUR TIMES DAILY, Disp: 400 strip, Rfl: 3    glucose monitoring kit (FREESTYLE) monitoring kit, Use 1 each 4 (four)  "times a day Fine to substitute other brand if not covered by insurance, Disp: 1 each, Rfl: 0    Insulin Pen Needle 31G X 8 MM MISC, Check sugars three times a day, Disp: 100 each, Rfl: 1    INSULIN SYRINGE .5CC/29G 29G X 1/2\" 0.5 ML MISC, Use, Disp: , Rfl:     Lancets (FREESTYLE) lancets, USE THREE TIMES DAILY AS DIRECTED, Disp: 300 each, Rfl: 0    OLANZapine (ZyPREXA) 10 mg tablet, Take 1 tablet (10 mg total) by mouth daily at bedtime, Disp: 90 tablet, Rfl: 0    omeprazole (PriLOSEC) 20 mg delayed release capsule, TAKE 1 CAPSULE(20 MG) BY MOUTH DAILY, Disp: 90 capsule, Rfl: 3    polyethylene glycol (GOLYTELY) 4000 mL solution, Take 4,000 mL by mouth once for 1 dose (Patient not taking: Reported on 12/5/2023), Disp: 4000 mL, Rfl: 0    traZODone (DESYREL) 50 mg tablet, Take 1/2 to 1 tab po qhs prn insomnia, Disp: 90 tablet, Rfl: 0    Valbenazine Tosylate (Ingrezza) 40 MG CAPS, Take 40 mg by mouth daily at bedtime, Disp: 90 capsule, Rfl: 0    Health Maintenance     Health Maintenance   Topic Date Due    Zoster Vaccine (1 of 2) Never done    Pneumococcal Vaccine: Pediatrics (0 to 5 Years) and At-Risk Patients (6 to 64 Years) (2 of 2 - PCV) 12/04/2019    DM Eye Exam  08/26/2022    COVID-19 Vaccine (3 - 2023-24 season) 09/01/2023    HEMOGLOBIN A1C  07/15/2024    Medicare Annual Wellness Visit (AWV)  11/27/2024    Kidney Health Evaluation: GFR  12/04/2024    Kidney Health Evaluation: Albumin/Creatinine Ratio  12/04/2024    BMI: Adult  04/08/2025    Diabetic Foot Exam  04/08/2025    BMI: Followup Plan  04/15/2025    Colorectal Cancer Screening  09/12/2028    HIV Screening  Completed    Hepatitis C Screening  Completed    Influenza Vaccine  Completed    HIB Vaccine  Aged Out    IPV Vaccine  Aged Out    Hepatitis A Vaccine  Aged Out    Meningococcal ACWY Vaccine  Aged Out    HPV Vaccine  Aged Out     Immunization History   Administered Date(s) Administered    COVID-19 PFIZER VACCINE 0.3 ML IM 03/30/2021, 04/24/2021    " INFLUENZA 12/11/2014, 10/09/2015, 12/21/2016, 09/28/2017    Influenza Quadrivalent, 6-35 Months IM 12/21/2016    Influenza, injectable, quadrivalent, preservative free 0.5 mL 11/06/2023    Influenza, recombinant, quadrivalent,injectable, preservative free 12/04/2018, 11/01/2019, 10/23/2020, 01/24/2022, 11/07/2022    Influenza, seasonal, injectable 12/11/2014, 10/09/2015, 09/28/2017    Pneumococcal Polysaccharide PPV23 12/04/2018         ----------------------------------------------------  Jessenia Alba MD, PGY-1  Internal Medicine Residency   [facility] [place], [place]

## 2024-04-15 NOTE — ASSESSMENT & PLAN NOTE
Lab Results   Component Value Date    HGBA1C 6.8 (A) 04/15/2024   Previously on trulicity, but has not been able to get it from the pharmacy in the last 2 months or so.   Will switch to mounjaro today in hopes that this will be more available. This is to aid mainly in weight loss as A1c is controlled.

## 2024-04-15 NOTE — ASSESSMENT & PLAN NOTE
CPAP broken for last 3 days but is otherwise compliant. States that he will reach out to the appropriate office to get a new one.

## 2024-04-15 NOTE — ASSESSMENT & PLAN NOTE
Lab Results   Component Value Date    EGFR 51 12/04/2023    EGFR 52 (L) 12/04/2023    EGFR 53 07/31/2023    CREATININE 1.48 (H) 12/04/2023    CREATININE 1.43 (H) 07/31/2023    CREATININE 1.71 (H) 02/23/2023   Follows with nephro. See plans in HTN and DM for management.

## 2024-04-16 ENCOUNTER — OFFICE VISIT (OUTPATIENT)
Dept: PSYCHIATRY | Facility: CLINIC | Age: 59
End: 2024-04-16

## 2024-04-16 DIAGNOSIS — G47.00 INSOMNIA, UNSPECIFIED TYPE: ICD-10-CM

## 2024-04-16 DIAGNOSIS — G24.01 TARDIVE DYSKINESIA: ICD-10-CM

## 2024-04-16 DIAGNOSIS — F41.0 PANIC ATTACKS: ICD-10-CM

## 2024-04-16 DIAGNOSIS — F31.5 BIPOLAR DISORDER, CURRENT EPISODE DEPRESSED, SEVERE, WITH PSYCHOTIC FEATURES (HCC): Primary | ICD-10-CM

## 2024-04-16 DIAGNOSIS — F41.1 GENERALIZED ANXIETY DISORDER: ICD-10-CM

## 2024-04-16 RX ORDER — VALBENAZINE 40 MG/1
40 CAPSULE ORAL
Qty: 90 CAPSULE | Refills: 0 | Status: SHIPPED | OUTPATIENT
Start: 2024-04-16

## 2024-04-16 RX ORDER — BUSPIRONE HYDROCHLORIDE 15 MG/1
TABLET ORAL
Qty: 180 TABLET | Refills: 0 | Status: SHIPPED | OUTPATIENT
Start: 2024-04-16

## 2024-04-16 RX ORDER — OLANZAPINE 10 MG/1
10 TABLET ORAL
Qty: 90 TABLET | Refills: 0 | Status: SHIPPED | OUTPATIENT
Start: 2024-04-16

## 2024-04-16 RX ORDER — BUPROPION HYDROCHLORIDE 100 MG/1
100 TABLET ORAL 2 TIMES DAILY
Qty: 180 TABLET | Refills: 0 | Status: SHIPPED | OUTPATIENT
Start: 2024-04-16

## 2024-04-16 RX ORDER — TRAZODONE HYDROCHLORIDE 50 MG/1
TABLET ORAL
Qty: 90 TABLET | Refills: 0 | Status: SHIPPED | OUTPATIENT
Start: 2024-04-16

## 2024-04-19 ENCOUNTER — APPOINTMENT (OUTPATIENT)
Dept: LAB | Facility: CLINIC | Age: 59
End: 2024-04-19
Payer: MEDICARE

## 2024-04-19 DIAGNOSIS — E55.9 VITAMIN D DEFICIENCY: ICD-10-CM

## 2024-04-19 DIAGNOSIS — E66.01 CLASS 3 SEVERE OBESITY DUE TO EXCESS CALORIES WITH SERIOUS COMORBIDITY AND BODY MASS INDEX (BMI) OF 45.0 TO 49.9 IN ADULT (HCC): ICD-10-CM

## 2024-04-19 DIAGNOSIS — R25.1 TREMOR: ICD-10-CM

## 2024-04-19 DIAGNOSIS — N18.30 TYPE 2 DIABETES MELLITUS WITH STAGE 3 CHRONIC KIDNEY DISEASE AND HYPERTENSION (HCC): ICD-10-CM

## 2024-04-19 DIAGNOSIS — I12.9 TYPE 2 DIABETES MELLITUS WITH STAGE 3 CHRONIC KIDNEY DISEASE AND HYPERTENSION (HCC): ICD-10-CM

## 2024-04-19 DIAGNOSIS — R80.1 PERSISTENT PROTEINURIA: ICD-10-CM

## 2024-04-19 DIAGNOSIS — E11.22 TYPE 2 DIABETES MELLITUS WITH STAGE 3 CHRONIC KIDNEY DISEASE AND HYPERTENSION (HCC): ICD-10-CM

## 2024-04-19 DIAGNOSIS — R41.3 MEMORY DEFICIT: ICD-10-CM

## 2024-04-19 LAB
ALBUMIN SERPL BCP-MCNC: 2.5 G/DL (ref 3.5–5)
ALP SERPL-CCNC: 90 U/L (ref 34–104)
ALT SERPL W P-5'-P-CCNC: 8 U/L (ref 7–52)
ANION GAP SERPL CALCULATED.3IONS-SCNC: 2 MMOL/L (ref 4–13)
AST SERPL W P-5'-P-CCNC: 14 U/L (ref 13–39)
BILIRUB SERPL-MCNC: 0.28 MG/DL (ref 0.2–1)
BUN SERPL-MCNC: 13 MG/DL (ref 5–25)
CALCIUM ALBUM COR SERPL-MCNC: 9.4 MG/DL (ref 8.3–10.1)
CALCIUM SERPL-MCNC: 8.2 MG/DL (ref 8.4–10.2)
CHLORIDE SERPL-SCNC: 108 MMOL/L (ref 96–108)
CHOLEST SERPL-MCNC: 168 MG/DL
CO2 SERPL-SCNC: 32 MMOL/L (ref 21–32)
CREAT SERPL-MCNC: 2.5 MG/DL (ref 0.6–1.3)
CREAT UR-MCNC: 34.8 MG/DL
GFR SERPL CREATININE-BSD FRML MDRD: 27 ML/MIN/1.73SQ M
GLUCOSE P FAST SERPL-MCNC: 89 MG/DL (ref 65–99)
HDLC SERPL-MCNC: 69 MG/DL
LDLC SERPL CALC-MCNC: 84 MG/DL (ref 0–100)
MICROALBUMIN UR-MCNC: 2257.9 MG/L
MICROALBUMIN/CREAT 24H UR: 6488 MG/G CREATININE (ref 0–30)
NONHDLC SERPL-MCNC: 99 MG/DL
POTASSIUM SERPL-SCNC: 3.9 MMOL/L (ref 3.5–5.3)
PROT SERPL-MCNC: 5.5 G/DL (ref 6.4–8.4)
SODIUM SERPL-SCNC: 142 MMOL/L (ref 135–147)
T4 FREE SERPL-MCNC: 0.67 NG/DL (ref 0.61–1.12)
TRIGL SERPL-MCNC: 75 MG/DL
TSH SERPL DL<=0.05 MIU/L-ACNC: 6.99 UIU/ML (ref 0.45–4.5)

## 2024-04-19 PROCEDURE — 82570 ASSAY OF URINE CREATININE: CPT

## 2024-04-19 PROCEDURE — 80053 COMPREHEN METABOLIC PANEL: CPT

## 2024-04-19 PROCEDURE — 83036 HEMOGLOBIN GLYCOSYLATED A1C: CPT

## 2024-04-19 PROCEDURE — 84439 ASSAY OF FREE THYROXINE: CPT

## 2024-04-19 PROCEDURE — 82043 UR ALBUMIN QUANTITATIVE: CPT

## 2024-04-19 PROCEDURE — 36415 COLL VENOUS BLD VENIPUNCTURE: CPT

## 2024-04-19 PROCEDURE — 84443 ASSAY THYROID STIM HORMONE: CPT

## 2024-04-19 PROCEDURE — 80061 LIPID PANEL: CPT

## 2024-04-20 LAB
EST. AVERAGE GLUCOSE BLD GHB EST-MCNC: 157 MG/DL
HBA1C MFR BLD: 7.1 %

## 2024-04-22 ENCOUNTER — SOCIAL WORK (OUTPATIENT)
Dept: BEHAVIORAL/MENTAL HEALTH CLINIC | Facility: CLINIC | Age: 59
End: 2024-04-22
Payer: MEDICARE

## 2024-04-22 ENCOUNTER — TELEPHONE (OUTPATIENT)
Dept: INTERNAL MEDICINE CLINIC | Facility: CLINIC | Age: 59
End: 2024-04-22

## 2024-04-22 ENCOUNTER — TELEPHONE (OUTPATIENT)
Dept: PSYCHIATRY | Facility: CLINIC | Age: 59
End: 2024-04-22

## 2024-04-22 DIAGNOSIS — F41.1 GENERALIZED ANXIETY DISORDER: ICD-10-CM

## 2024-04-22 DIAGNOSIS — E66.01 CLASS 3 SEVERE OBESITY DUE TO EXCESS CALORIES WITH SERIOUS COMORBIDITY AND BODY MASS INDEX (BMI) OF 45.0 TO 49.9 IN ADULT (HCC): ICD-10-CM

## 2024-04-22 DIAGNOSIS — E11.22 TYPE 2 DIABETES MELLITUS WITH STAGE 3 CHRONIC KIDNEY DISEASE AND HYPERTENSION (HCC): Primary | ICD-10-CM

## 2024-04-22 DIAGNOSIS — I12.9 TYPE 2 DIABETES MELLITUS WITH STAGE 3 CHRONIC KIDNEY DISEASE AND HYPERTENSION (HCC): Primary | ICD-10-CM

## 2024-04-22 DIAGNOSIS — G47.00 INSOMNIA, UNSPECIFIED TYPE: ICD-10-CM

## 2024-04-22 DIAGNOSIS — N18.30 TYPE 2 DIABETES MELLITUS WITH STAGE 3 CHRONIC KIDNEY DISEASE AND HYPERTENSION (HCC): Primary | ICD-10-CM

## 2024-04-22 DIAGNOSIS — F31.76 BIPOLAR I DISORDER, MOST RECENT EPISODE DEPRESSED, IN FULL REMISSION (HCC): Primary | ICD-10-CM

## 2024-04-22 DIAGNOSIS — F41.0 PANIC ATTACKS: ICD-10-CM

## 2024-04-22 PROCEDURE — 90837 PSYTX W PT 60 MINUTES: CPT | Performed by: SOCIAL WORKER

## 2024-04-22 NOTE — PSYCH
"Behavioral Health Psychotherapy Progress Note    Psychotherapy Provided: Individual Psychotherapy     1. Bipolar I disorder, most recent episode depressed, in full remission (HCC)        2. Generalized anxiety disorder        3. Panic attacks        4. Insomnia, unspecified type            Goals addressed in session: Goal 1     DATA: My depression is bad. I can't figure out why. However he denies suicidal ideation, plan or intent. He and his partner were given the house they have been living in by his partner's family. He is getting along better with his father in law. I provided support, encouragement and strategies to cope.   During this session, this clinician used the following therapeutic modalities: Client-centered Therapy, Cognitive Behavioral Therapy, Mindfulness-based Strategies, and Supportive Psychotherapy    Substance Abuse was not addressed during this session. If the client is diagnosed with a co-occurring substance use disorder, please indicate any changes in the frequency or amount of use: n/a. Stage of change for addressing substance use diagnoses: No substance use/Not applicable    ASSESSMENT:  Tommie Taylor presents with a Depressed mood but he shared he can't pinpoint why.      his affect is depressed which is congruent, with his mood and the content of the session. The client has made progress on their goals however he can't explain why he has depression.      Tommie Taylor presents with a none risk of suicide, none risk of self-harm, and none risk of harm to others.    For any risk assessment that surpasses a \"low\" rating, a safety plan must be developed.    A safety plan was indicated: no  If yes, describe in detail n/a    PLAN: Between sessions, Tommie Taylor will use mindfulness and CBT. At the next session, the therapist will use Client-centered Therapy, Cognitive Behavioral Therapy, Mindfulness-based Strategies, and Supportive Psychotherapy to address issues and symptoms as they " may arise. .    Behavioral Health Treatment Plan and Discharge Planning: Tommie Cortezton is aware of and agrees to continue to work on their treatment plan. They have identified and are working toward their discharge goals. yes    Visit start and stop times:    04/22/24  Start Time: 1500  Stop Time: 1600  Total Visit Time: 60 minutes

## 2024-04-22 NOTE — TELEPHONE ENCOUNTER
The patient contacted the office, seeking the time of his appointment scheduled for today. The writer informed the patient that he has an appointment at 3 pm. The patient verbalized his understanding.

## 2024-04-23 ENCOUNTER — TELEPHONE (OUTPATIENT)
Dept: INTERNAL MEDICINE CLINIC | Facility: CLINIC | Age: 59
End: 2024-04-23

## 2024-04-23 NOTE — TELEPHONE ENCOUNTER
Contacted patient's pharmacy at . Introduced self and role. Patient's script for Ozempic requires a prior authorization.     Contacted [facility] at . Spoke with Margarita. Patient's prior authorization pending for Ozempic. Review can take 72 hrs. Reference #[id number].

## 2024-04-24 ENCOUNTER — CONSULT (OUTPATIENT)
Dept: BARIATRICS | Facility: CLINIC | Age: 59
End: 2024-04-24
Payer: MEDICARE

## 2024-04-24 VITALS
DIASTOLIC BLOOD PRESSURE: 102 MMHG | OXYGEN SATURATION: 98 % | TEMPERATURE: 97.2 F | SYSTOLIC BLOOD PRESSURE: 199 MMHG | BODY MASS INDEX: 50.46 KG/M2 | WEIGHT: 314 LBS | HEART RATE: 79 BPM | HEIGHT: 66 IN

## 2024-04-24 DIAGNOSIS — G47.33 OSA (OBSTRUCTIVE SLEEP APNEA): Primary | ICD-10-CM

## 2024-04-24 DIAGNOSIS — E11.22 TYPE 2 DIABETES MELLITUS WITH STAGE 3 CHRONIC KIDNEY DISEASE AND HYPERTENSION (HCC): ICD-10-CM

## 2024-04-24 DIAGNOSIS — N18.30 TYPE 2 DIABETES MELLITUS WITH STAGE 3 CHRONIC KIDNEY DISEASE AND HYPERTENSION (HCC): ICD-10-CM

## 2024-04-24 DIAGNOSIS — E66.01 CLASS 3 SEVERE OBESITY DUE TO EXCESS CALORIES WITH SERIOUS COMORBIDITY AND BODY MASS INDEX (BMI) OF 45.0 TO 49.9 IN ADULT (HCC): ICD-10-CM

## 2024-04-24 DIAGNOSIS — N18.32 STAGE 3B CHRONIC KIDNEY DISEASE (HCC): ICD-10-CM

## 2024-04-24 DIAGNOSIS — F31.5 BIPOLAR I DISORDER, SEVERE, CURRENT OR MOST RECENT EPISODE DEPRESSED, WITH PSYCHOTIC FEATURES (HCC): ICD-10-CM

## 2024-04-24 DIAGNOSIS — K21.9 GASTROESOPHAGEAL REFLUX DISEASE, UNSPECIFIED WHETHER ESOPHAGITIS PRESENT: ICD-10-CM

## 2024-04-24 DIAGNOSIS — I12.9 TYPE 2 DIABETES MELLITUS WITH STAGE 3 CHRONIC KIDNEY DISEASE AND HYPERTENSION (HCC): ICD-10-CM

## 2024-04-24 DIAGNOSIS — E78.2 MIXED HYPERLIPIDEMIA: ICD-10-CM

## 2024-04-24 DIAGNOSIS — I10 HYPERTENSION, UNSPECIFIED TYPE: ICD-10-CM

## 2024-04-24 PROCEDURE — 99203 OFFICE O/P NEW LOW 30 MIN: CPT | Performed by: SURGERY

## 2024-04-24 NOTE — PROGRESS NOTES
BARIATRIC INITIAL CONSULT - BARIATRIC SURGERY    Tommie Taylor 58 y.o. male MRN: [id number]  Unit/Bed#:  Encounter: [id number]      HPI:  Tommie Taylor is a very pleasant 58 y.o. male who presents with a longstanding history of morbid obesity and inability to sustain a meaningful weight loss.    Here today to discuss bariatric options. He is a former   Body mass index is 50.83 kg/m².    ++Suffers from HTN, HLD, DM2 (25yrs, insulin), BPD1, GERD (diet induced), DAISY  S/p L hand sx    Visit type: consultation     Symptoms: excess weight, weight increase, inability to loss weight, dysnea, and fatigue    Associated Symptoms: depressed mood and anxiety    Associated Conditions: glucose intolerance, hyperlipidemia, sleep apnea, abdominal obesity, and hypergycemia  Disease Complications: diabetes, hypertension, and sleep apnea  Weight Loss Interest: high  Previous Diet Trials: low carb    Exercise Frequency:infrequency  Types of Exercise: walking    Review of Systems   Constitutional: Negative.    Respiratory: Negative.     Cardiovascular: Negative.    Gastrointestinal: Negative.    Musculoskeletal: Negative.    Neurological: Negative.    All other systems reviewed and are negative.      Historical Information   Past Medical History:   Diagnosis Date    ADHD, adult residual type     Anxiety     Anxiety     Bipolar 1 disorder (HCC)     Cataract     Left eye    CPAP (continuous positive airway pressure) dependence     Depression     Depression     Diabetes mellitus (HCC)     blood sugar 167 on admission    Equinus deformity of foot     GERD (gastroesophageal reflux disease)     Homicidal ideations     Hyperlipidemia     Hypertension     Microalbuminuria     Morbid obesity (HCC)     Neuropathy     Shortness of breath     Sleep apnea     CPAP at bedtime    Sleep apnea     Stroke (HCC)     Suicidal intent      Past Surgical History:   Procedure Laterality Date    CATARACT EXTRACTION      CATARACT EXTRACTION    "   COLONOSCOPY      HAND SURGERY Right     OTHER SURGICAL HISTORY      REPAIR OF SUPERFICIAL WOUND FACE    TX ESOPHAGOGASTRODUODENOSCOPY TRANSORAL DIAGNOSTIC N/A 2018    Procedure: ESOPHAGOGASTRODUODENOSCOPY (EGD);  Surgeon: Yinka Maier MD;  Location: [facility];  Service: Gastroenterology    TX ESOPHAGOGASTRODUODENOSCOPY TRANSORAL DIAGNOSTIC N/A 2018    Procedure: EGD AND COLONOSCOPY;  Surgeon: Yinka Maier MD;  Location: [facility];  Service: Gastroenterology     Social History   Social History     Substance and Sexual Activity   Alcohol Use Yes    Comment: rarely     Social History     Substance and Sexual Activity   Drug Use Not Currently    Comment: quit 20 years ago     Social History     Tobacco Use   Smoking Status Former    Current packs/day: 0.00    Types: Cigarettes    Quit date:     Years since quittin.3   Smokeless Tobacco Former    Types: Chew   Tobacco Comments    pt \"Quit after approx over 25 years ago\"     Family History: non-contributory    Meds/Allergies   all medications and allergies reviewed  Allergies   Allergen Reactions    Pollen Extract Nasal Congestion    Tetanus Toxoid Swelling       Objective     Current Vitals:   BP (!) 199/102 (BP Location: Left arm, Patient Position: Sitting, Cuff Size: Large)   Pulse 79   Temp (!) 97.2 °F (36.2 °C) (Tympanic)   Ht 5' 5.9\" (1.674 m)   Wt (!) 142 kg (314 lb)   SpO2 98%   BMI 50.83 kg/m²       Physical Exam  Vitals reviewed.   Constitutional:       Appearance: He is well-developed.   HENT:      Head: Normocephalic.   Eyes:      Extraocular Movements: Extraocular movements intact.   Cardiovascular:      Rate and Rhythm: Normal rate.   Pulmonary:      Effort: Pulmonary effort is normal.   Abdominal:      General: There is no distension.   Musculoskeletal:         General: Normal range of motion.      Cervical back: Normal range of motion.   Neurological:      Mental Status: He is alert and oriented to person, place, and time. "   Psychiatric:         Mood and Affect: Mood normal.         Behavior: Behavior normal.         Thought Content: Thought content normal.         Judgment: Judgment normal.       Lab Results: I have personally reviewed pertinent lab results.    Imaging: I have personally reviewed pertinent reports.    EKG, Pathology, and Other Studies: I have personally reviewed pertinent reports.        Assessment/PLAN:    58 y.o. yo male with a long standing h/o of obesity and inability to sustain any meaningful weight loss on his own despite several attempts.    He is interested in the Laparoscopic sleeve gastrectomy.    I have discussed with the patient that a percentage of patient's may experience new or worsened GERD and 18% risk of Tolbert's esophagitis requiring surveillance EGDs after a sleeve gastrectomy. The patient understands this fully and accepts the risks to undergo a sleeve gastrectomy.     ++Suffers from HTN, HLD, DM2 (25yrs, insulin), BPD1, GERD (diet induced), DAISY  S/p L hand sx    I have explained our Enhanced Recovery After Bariatric Surgery (ERABS) protocol and benefits including preoperative, intraoperative and postoperative elements.     As a part of his pre op process, he will undergo evaluation appointments with out dietician, licensed care , and . After the evaluation, he may be referred to a cardiologist    He needs to obtain an EGD and colonoscopy with GI    I have spent over 45 minutes with him face to face in the office today discussing his options and details of the surgery. We have seen an animation of the surgery on the computer that illustrates how the operation is done and how the anatomy will be altered with the procedure. Over 50% of this was coordinating care.    I have discussed and educated the patient with regards to the components of our multidisciplinary program and the importance of compliance and follow up in the post operative period.    He was given  the opportunity to ask questions and I have answered all of them.    The patient was also instructed with regards to the importance of behavior modification, nutritional counseling, support meeting attendance and lifestyle changes that are important to ensure success.    Although there is a great statistical chance of improvement or even resolution of most of his associated comorbidities, the results vary from patient to patient and they largely depend on his commitment and compliance.       DMITRIY Kaur MD, FACS, [facility]  4/24/2024  11:30 AM

## 2024-04-24 NOTE — TELEPHONE ENCOUNTER
Prior authorization approved for [employer]. Reference # [id number].     Spoke with patient's pharmacy at . Pharmacy updated with prior authorization approval. Covered with zero copay and pharmacy has in stock. Pharmacy will contact patient directly for .

## 2024-05-06 ENCOUNTER — TELEPHONE (OUTPATIENT)
Dept: BARIATRICS | Facility: CLINIC | Age: 59
End: 2024-05-06

## 2024-05-06 ENCOUNTER — CLINICAL SUPPORT (OUTPATIENT)
Dept: BARIATRICS | Facility: CLINIC | Age: 59
End: 2024-05-06

## 2024-05-06 DIAGNOSIS — E66.01 MORBID OBESITY (HCC): Primary | ICD-10-CM

## 2024-05-06 PROCEDURE — RECHECK

## 2024-05-06 NOTE — TELEPHONE ENCOUNTER
Spoke to patient on the phone:    Patient is interested in the bariatric surgical process. Patient qualifies for surgery with current comorbidities and BMI. Discussed the entire surgical workup process and all requirements of [facility] program and patient's insurance. Answered all questions at the time of the phone call. All SW/RD questions redirected to the next appointment, the 2-hour evaluation.      Patient verbalized understanding of the surgical process at [facility] and had no further questions at this time.

## 2024-05-13 DIAGNOSIS — K22.2 LOWER ESOPHAGEAL RING (SCHATZKI): ICD-10-CM

## 2024-05-13 DIAGNOSIS — K44.9 HIATAL HERNIA: ICD-10-CM

## 2024-05-13 DIAGNOSIS — K21.00 GASTROESOPHAGEAL REFLUX DISEASE WITH ESOPHAGITIS: ICD-10-CM

## 2024-05-13 RX ORDER — OMEPRAZOLE 20 MG/1
20 CAPSULE, DELAYED RELEASE ORAL DAILY
Qty: 90 CAPSULE | Refills: 3 | Status: SHIPPED | OUTPATIENT
Start: 2024-05-13

## 2024-05-16 ENCOUNTER — TELEPHONE (OUTPATIENT)
Dept: INTERNAL MEDICINE CLINIC | Facility: CLINIC | Age: 59
End: 2024-05-16

## 2024-05-16 NOTE — TELEPHONE ENCOUNTER
This patient has an appointment on May 29- please call him and tell him to bring all of his meds in a bag that day to the visit- thank you- Dr. Donaldson

## 2024-05-18 ENCOUNTER — HOSPITAL ENCOUNTER (EMERGENCY)
Facility: HOSPITAL | Age: 59
Discharge: HOME | End: 2024-05-18
Attending: EMERGENCY MEDICINE
Payer: MEDICARE

## 2024-05-18 VITALS
HEART RATE: 99 BPM | DIASTOLIC BLOOD PRESSURE: 117 MMHG | RESPIRATION RATE: 20 BRPM | SYSTOLIC BLOOD PRESSURE: 184 MMHG | TEMPERATURE: 97.6 F | OXYGEN SATURATION: 99 %

## 2024-05-18 DIAGNOSIS — J06.9 VIRAL URI WITH COUGH: Primary | ICD-10-CM

## 2024-05-18 LAB
FLUAV RNA RESP QL NAA+PROBE: NEGATIVE
FLUBV RNA RESP QL NAA+PROBE: NEGATIVE
RSV RNA RESP QL NAA+PROBE: NEGATIVE
SARS-COV-2 RNA RESP QL NAA+PROBE: NEGATIVE

## 2024-05-18 PROCEDURE — 99284 EMERGENCY DEPT VISIT MOD MDM: CPT | Performed by: EMERGENCY MEDICINE

## 2024-05-18 PROCEDURE — 99284 EMERGENCY DEPT VISIT MOD MDM: CPT

## 2024-05-18 PROCEDURE — 0241U HB NFCT DS VIR RESP RNA 4 TRGT: CPT

## 2024-05-18 NOTE — ED PROVIDER NOTES
"History  Chief Complaint   Patient presents with    Cough     Cough, congestion onset today, fatigue     HPI    59 yo male presents for evaluation of cough. He has a hx of bipolar disorder, HTN, T2DM, and reports 1 day of cough and fatigue. Cough is productive of white sputum, associated with fatigue and intermittent SOB. He reports multiple members of his household also developed a cough at the same time. He denies fever, chills, bloody sputum, chest pain, sore throat, myalgias.     Prior to Admission Medications   Prescriptions Last Dose Informant Patient Reported? Taking?   Blood Glucose Monitoring Suppl (FREESTYLE LITE) ANTONIA  Self No No   Sig: by Does not apply route 3 (three) times a day   Patient not taking: Reported on 4/24/2024   Blood Glucose Monitoring Suppl (FreeStyle Lite) w/Device KIT  Self No No   Sig: USE AS INSTRUCTED 4 TIMES A DAY   Continuous Blood Gluc  (FreeStyle [place] 14 Day [place]) ANTONIA  Self No No   Sig: Use  to check BG at least 4 times a day   Continuous Blood Gluc Sensor (FreeStyle [place] 14 Day Sensor) MISC  Self No No   Sig: Apply sensor every 14 days to check BG at least 4 times a day   Patient not taking: Reported on 4/24/2024   FREESTYLE LITE test strip  Self No No   Sig: CHECK BLOOD SUGARS FOUR TIMES DAILY   INSULIN SYRINGE .5CC/29G 29G X 1/2\" 0.5 ML MISC  Self Yes No   Sig: Use   Insulin Pen Needle 31G X 8 MM MISC  Self No No   Sig: Check sugars three times a day   Lancets (FREESTYLE) lancets  Self No No   Sig: USE THREE TIMES DAILY AS DIRECTED   OLANZapine (ZyPREXA) 10 mg tablet   No No   Sig: Take 1 tablet (10 mg total) by mouth daily at bedtime   Valbenazine Tosylate (Ingrezza) 40 MG CAPS   No No   Sig: Take 40 mg by mouth daily at bedtime   amLODIPine-benazepril (LOTREL) 10-40 MG per capsule   No No   Sig: Take 1 capsule by mouth daily   ammonium lactate (LAC-HYDRIN) 12 % cream  Self No No   Sig: Apply topically as needed for dry skin   Patient not taking: Reported " on 2024   ammonium lactate (LAC-HYDRIN) 12 % cream  Self No No   Sig: Apply topically as needed for dry skin   atorvastatin (LIPITOR) 20 mg tablet  Self No No   Sig: Take 1 tablet (20 mg total) by mouth daily   buPROPion (WELLBUTRIN) 100 mg tablet   No No   Sig: Take 1 tablet (100 mg total) by mouth 2 (two) times a day Take bid--(once in the AM and once at 12 noon)   busPIRone (BUSPAR) 15 mg tablet   No No   Sig: Take 1/2 - 1 tab po qd-bid   clonazePAM (KlonoPIN) 1 mg tablet  Self No No   Sig: TAKE 1 TABLET BY MOUTH EVERY DAY AS NEEDED FOR ANXIETY OR PANIC ATTACKS   ergocalciferol (VITAMIN D2) 50,000 units   No No   Sig: Take 1 capsule (50,000 Units total) by mouth once a week for 6 doses   glucose monitoring kit (FREESTYLE) monitoring kit  Self No No   Sig: Use 1 each 4 (four) times a day Fine to substitute other brand if not covered by insurance   omeprazole (PriLOSEC) 20 mg delayed release capsule   No No   Sig: Take 1 capsule (20 mg total) by mouth daily   polyethylene glycol (GOLYTELY) 4000 mL solution   No No   Sig: Take 4,000 mL by mouth once for 1 dose   Patient not taking: Reported on 2023   semaglutide, 0.25 or 0.5 mg/dose, (Ozempic, 0.25 or 0.5 MG/DOSE,) 2 mg/3 mL injection pen   No No   Si.25 mg under the skin every 7 days for 4 doses (28 days), THEN 0.5 mg under the skin every 7 days   Patient not taking: Reported on 2024   traZODone (DESYREL) 50 mg tablet   No No   Sig: Take 1/2 to 1 tab po qhs prn insomnia   vitamin B-12 (CYANOCOBALAMIN) 500 MCG TABS   No No   Sig: Take 1 tablet (500 mcg total) by mouth daily   Patient not taking: Reported on 2024      Facility-Administered Medications: None       Past Medical History:   Diagnosis Date    ADHD, adult residual type     Anxiety     Anxiety     Bipolar 1 disorder (HCC)     Cataract     Left eye    CPAP (continuous positive airway pressure) dependence     Depression     Depression     Diabetes mellitus (HCC)     blood sugar 167 on  "admission    Equinus deformity of foot     GERD (gastroesophageal reflux disease)     Homicidal ideations     Hyperlipidemia     Hypertension     Microalbuminuria     Morbid obesity (HCC)     Neuropathy     Shortness of breath     Sleep apnea     CPAP at bedtime    Sleep apnea     Stroke (HCC)     Suicidal intent        Past Surgical History:   Procedure Laterality Date    CATARACT EXTRACTION      CATARACT EXTRACTION      COLONOSCOPY      HAND SURGERY Right     OTHER SURGICAL HISTORY      REPAIR OF SUPERFICIAL WOUND FACE    IL ESOPHAGOGASTRODUODENOSCOPY TRANSORAL DIAGNOSTIC N/A 2018    Procedure: ESOPHAGOGASTRODUODENOSCOPY (EGD);  Surgeon: Yinka Maier MD;  Location: [facility];  Service: Gastroenterology    IL ESOPHAGOGASTRODUODENOSCOPY TRANSORAL DIAGNOSTIC N/A 2018    Procedure: EGD AND COLONOSCOPY;  Surgeon: Yinka Maier MD;  [facility];  Service: Gastroenterology       Family History   Problem Relation Age of Onset    Diabetes Mother     Alcohol abuse Father     Diabetes Father     Hypertension Father     Lung cancer Father     Stroke Father     Cancer Father         lung    Alcohol abuse Sister     Depression Sister     Lymphoma Sister     Alcohol abuse Family     Alcohol abuse Sister     Alcohol abuse Sister     Cancer Sister     Heart disease Neg Hx     Thyroid disease Neg Hx      I have reviewed and agree with the history as documented.    E-Cigarette/Vaping    E-Cigarette Use Never User      E-Cigarette/Vaping Substances    Nicotine No     THC No     CBD No     Flavoring No     Other No     Unknown No      Social History     Tobacco Use    Smoking status: Former     Current packs/day: 0.00     Types: Cigarettes     Quit date: 1985     Years since quittin.4    Smokeless tobacco: Current     Types: Chew    Tobacco comments:     pt \"Quit after approx over 25 years ago\"   Vaping Use    Vaping status: Never Used   Substance Use Topics    Alcohol use: Not Currently     Comment: rarely "    Drug use: Not Currently     Comment: quit 20 years ago        Review of Systems   Constitutional:  Positive for fatigue. Negative for chills and fever.   HENT:  Negative for congestion, rhinorrhea and sore throat.    Respiratory:  Positive for cough and shortness of breath (intermittent).    Cardiovascular:  Negative for chest pain.   Gastrointestinal:  Negative for abdominal pain, nausea and vomiting.   Skin:  Negative for color change.   Neurological:  Negative for dizziness and headaches.       Physical Exam  ED Triage Vitals   Temperature Pulse Respirations Blood Pressure SpO2   05/18/24 1513 05/18/24 1513 05/18/24 1513 05/18/24 1516 05/18/24 1513   97.6 °F (36.4 °C) 99 20 (!) 184/117 99 %      Temp Source Heart Rate Source Patient Position - Orthostatic VS BP Location FiO2 (%)   05/18/24 1513 05/18/24 1513 -- -- --   Oral Monitor         Pain Score       --                    Orthostatic Vital Signs  Vitals:    05/18/24 1513 05/18/24 1516   BP:  (!) 184/117   Pulse: 99        Physical Exam  Vitals and nursing note reviewed.   Constitutional:       General: He is not in acute distress.     Appearance: He is well-developed.   HENT:      Head: Normocephalic and atraumatic.   Eyes:      Conjunctiva/sclera: Conjunctivae normal.   Cardiovascular:      Rate and Rhythm: Normal rate and regular rhythm.      Heart sounds: No murmur heard.  Pulmonary:      Effort: Pulmonary effort is normal. No respiratory distress.      Breath sounds: Normal breath sounds.   Abdominal:      Palpations: Abdomen is soft.      Tenderness: There is no abdominal tenderness.   Musculoskeletal:         General: No swelling.      Cervical back: Neck supple.   Skin:     General: Skin is warm and dry.      Capillary Refill: Capillary refill takes less than 2 seconds.   Neurological:      Mental Status: He is alert.   Psychiatric:         Mood and Affect: Mood normal.         ED Medications  Medications - No data to display    Diagnostic  Studies  Results Reviewed       Procedure Component Value Units Date/Time    FLU/RSV/COVID - if FLU/RSV clinically relevant [[id number]]  (Normal) Collected: 05/18/24 1537    Lab Status: Final result Specimen: Nares from Nose Updated: 05/18/24 1643     SARS-CoV-2 Negative     INFLUENZA A PCR Negative     INFLUENZA B PCR Negative     RSV PCR Negative    Narrative:      FOR PEDIATRIC PATIENTS - copy/paste COVID Guidelines URL to browser: [email or web address]    SARS-CoV-2 assay is a Nucleic Acid Amplification assay intended for the  qualitative detection of nucleic acid from SARS-CoV-2 in nasopharyngeal  swabs. Results are for the presumptive identification of SARS-CoV-2 RNA.    Positive results are indicative of infection with SARS-CoV-2, the virus  causing COVID-19, but do not rule out bacterial infection or co-infection  with other viruses. Laboratories within the [place] and its  territories are required to report all positive results to the appropriate  public health authorities. Negative results do not preclude SARS-CoV-2  infection and should not be used as the sole basis for treatment or other  patient management decisions. Negative results must be combined with  clinical observations, patient history, and epidemiological information.  This test has not been FDA cleared or approved.    This test has been authorized by FDA under an Emergency Use Authorization  (EUA). This test is only authorized for the duration of time the  declaration that circumstances exist justifying the authorization of the  emergency use of an in vitro diagnostic tests for detection of SARS-CoV-2  virus and/or diagnosis of COVID-19 infection under section 564(b)(1) of  the Act, 21 U.S.C. 360bbb-3(b)(1), unless the authorization is terminated  or revoked sooner. The test has been validated but independent review by FDA  and CLIA is pending.    Test performed using [employer]  GeneXpert: This RT-PCR assay targets N2,  a region unique to SARS-CoV-2. A conserved region in the E-gene was chosen  for pan-Sarbecovirus detection which includes SARS-CoV-2.    According to CMS-2020-01-R, this platform meets the definition of high-throughput technology.                   No orders to display         Procedures  Procedures      ED Course                                       Medical Decision Making  59 yo male presents for evaluation of cough.     On initial evaluation, pt in no acute distress. BP elevated, but vital signs otherwise stable. Lungs clear on examination, oropharynx clear and physical exam otherwise unremarkable. Differentials include COVID/flu/RSV, pneumonia, bronchitis. Given multiple people in his home with similar symptoms, and lack of other symptoms, viral syndrome most likely diagnosis. Viral swab negative, and pt discharged with return precautions and instructions to follow up with PCP if symptoms persist.           Disposition  Final diagnoses:   Viral URI with cough     Time reflects when diagnosis was documented in both MDM as applicable and the Disposition within this note       Time User Action Codes Description Comment    5/18/2024  4:40 PM Sujatha Scales Add [J06.9] Viral URI with cough           ED Disposition       ED Disposition   Discharge    Condition   Stable    Date/Time   Sat May 18, 2024  4:40 PM    Comment   Tommie Taylor discharge to home/self care.                   Follow-up Information       Follow up With Specialties Details Why Contact Info Additional Information    [facility] Internal Medicine  As needed. [address] [place] [id number]  [phone] [facility], [address] [place] [address], [id number]   [phone]    [facility] Emergency Department Emergency Medicine  As we discussed, return to the Emergency Department for  difficulty breathing or chest pain. [address] [facility] [address]  [phone] [facility] Emergency Department, [address] [place], [place], [address]            Discharge Medication List as of 5/18/2024  4:57 PM        CONTINUE these medications which have NOT CHANGED    Details   omeprazole (PriLOSEC) 20 mg delayed release capsule Take 1 capsule (20 mg total) by mouth daily, Starting Mon 5/13/2024, Normal      amLODIPine-benazepril (LOTREL) 10-40 MG per capsule Take 1 capsule by mouth daily, Starting Mon 4/15/2024, Until Sun 7/14/2024, Normal      !! ammonium lactate (LAC-HYDRIN) 12 % cream Apply topically as needed for dry skin, Starting Mon 11/22/2021, Normal      !! ammonium lactate (LAC-HYDRIN) 12 % cream Apply topically as needed for dry skin, Starting Mon 11/21/2022, Normal      atorvastatin (LIPITOR) 20 mg tablet Take 1 tablet (20 mg total) by mouth daily, Starting Mon 11/27/2023, Normal      Blood Glucose Monitoring Suppl (FREESTYLE LITE) ANTONIA by Does not apply route 3 (three) times a day, Starting Fri 3/22/2019, Normal      Blood Glucose Monitoring Suppl (FreeStyle Lite) w/Device KIT USE AS INSTRUCTED 4 TIMES A DAY, Normal      buPROPion (WELLBUTRIN) 100 mg tablet Take 1 tablet (100 mg total) by mouth 2 (two) times a day Take bid--(once in the AM and once at 12 noon), Starting Tue 4/16/2024, Normal      busPIRone (BUSPAR) 15 mg tablet Take 1/2 - 1 tab po qd-bid, Normal      clonazePAM (KlonoPIN) 1 mg tablet TAKE 1 TABLET BY MOUTH EVERY DAY AS NEEDED FOR ANXIETY OR PANIC ATTACKS, Normal      Continuous Blood Gluc  (FreeStyle Clarence 14 Day [place]) ANTONIA Use  to check BG at least 4 times a day, Normal      Continuous Blood Gluc Sensor (FreeStyle [place] 14 Day Sensor) MISC Apply sensor every 14 days to check BG at least 4 times a day, Normal      ergocalciferol (VITAMIN D2) 50,000 units Take 1 capsule (50,000 Units total) by mouth once a  "week for 6 doses, Starting Mon 4/15/2024, Until Tue 5/21/2024, Normal      FREESTYLE LITE test strip CHECK BLOOD SUGARS FOUR TIMES DAILY, Normal      glucose monitoring kit (FREESTYLE) monitoring kit Use 1 each 4 (four) times a day Fine to substitute other brand if not covered by insurance, Starting Tue 1/18/2022, Normal      Insulin Pen Needle 31G X 8 MM MISC Check sugars three times a day, Normal      INSULIN SYRINGE .5CC/29G 29G X 1/2\" 0.5 ML MISC Use, Starting Tue 11/26/2013, Historical Med      Lancets (FREESTYLE) lancets USE THREE TIMES DAILY AS DIRECTED, Normal      OLANZapine (ZyPREXA) 10 mg tablet Take 1 tablet (10 mg total) by mouth daily at bedtime, Starting Tue 4/16/2024, Normal      polyethylene glycol (GOLYTELY) 4000 mL solution Take 4,000 mL by mouth once for 1 dose, Starting Mon 8/14/2023, Normal      semaglutide, 0.25 or 0.5 mg/dose, (Ozempic, 0.25 or 0.5 MG/DOSE,) 2 mg/3 mL injection pen 0.25 mg under the skin every 7 days for 4 doses (28 days), THEN 0.5 mg under the skin every 7 days, Normal      traZODone (DESYREL) 50 mg tablet Take 1/2 to 1 tab po qhs prn insomnia, Normal      Valbenazine Tosylate (Ingrezza) 40 MG CAPS Take 40 mg by mouth daily at bedtime, Starting Tue 4/16/2024, Normal      vitamin B-12 (CYANOCOBALAMIN) 500 MCG TABS Take 1 tablet (500 mcg total) by mouth daily, Starting Mon 4/15/2024, Until Sun 7/14/2024, Normal       !! - Potential duplicate medications found. Please discuss with provider.        No discharge procedures on file.    PDMP Review         Value Time User    PDMP Reviewed  Yes 10/11/2023  4:57 PM Anel Dowd PA-C             ED Provider  Attending physically available and evaluated Tommie Taylor. I managed the patient along with the ED Attending.    Electronically Signed by           Sujatha Scales MD  05/19/24 0124    "

## 2024-05-18 NOTE — ED ATTENDING ATTESTATION
5/18/2024  I, Bettina Samano MD, saw and evaluated the patient. I have discussed the patient with the resident/non-physician practitioner and agree with the resident's/non-physician practitioner's findings, Plan of Care, and MDM as documented in the resident's/non-physician practitioner's note, except where noted. All available labs and Radiology studies were reviewed.  I was present for key portions of any procedure(s) performed by the resident/non-physician practitioner and I was immediately available to provide assistance.       At this point I agree with the current assessment done in the Emergency Department.  I have conducted an independent evaluation of this patient a history and physical is as follows:    58-year-old male with history of DM2, CKD stage III, hypertension.  Patient presents for evaluation of a cough productive of white sputum that started yesterday.  Denies fevers or chills.  No shortness of breath.  Also feels fatigued.  Denies sore throat, body aches, nausea, vomiting, chest pain, abdominal pain, or headache.  Of note, he has multiple family members who currently have similar symptoms.    Physical Exam  Vitals and nursing note reviewed.   Constitutional:       General: He is not in acute distress.     Appearance: Normal appearance. He is well-developed. He is not ill-appearing, toxic-appearing or diaphoretic.   HENT:      Head: Normocephalic and atraumatic.      Right Ear: External ear normal.      Left Ear: External ear normal.      Nose: Nose normal.   Eyes:      Conjunctiva/sclera: Conjunctivae normal.   Cardiovascular:      Rate and Rhythm: Normal rate and regular rhythm.      Pulses: Normal pulses.      Heart sounds: Normal heart sounds. No murmur heard.     No friction rub. No gallop.   Pulmonary:      Effort: Pulmonary effort is normal. No respiratory distress.      Breath sounds: Normal breath sounds. No wheezing or rales.   Abdominal:      General: Bowel sounds are normal. There  is no distension.      Palpations: Abdomen is soft.      Tenderness: There is no abdominal tenderness. There is no guarding.   Musculoskeletal:         General: No deformity. Normal range of motion.      Cervical back: Normal range of motion and neck supple.      Comments: Trace edema to the bilateral lower extremities below the knees without calf tenderness, patient states is chronic and at baseline   Skin:     General: Skin is warm and dry.   Neurological:      Mental Status: He is alert and oriented to person, place, and time.      Motor: No abnormal muscle tone.         ED Course     Patient is well-appearing.  Nontoxic.  Afebrile and hemodynamically stable.  Satting 99% on room air without increased work of breathing.  Lungs are clear to auscultation bilaterally, doubt pneumonia.  Patient has no other symptoms on exam.  COVID, flu, and RSV negative.  Suspect upper respiratory infection.  Discussed over-the-counter medications that can be trialed for relief.  Reasons to return to the emergency department discussed.    Critical Care Time  Procedures

## 2024-05-23 ENCOUNTER — TELEPHONE (OUTPATIENT)
Dept: PSYCHIATRY | Facility: CLINIC | Age: 59
End: 2024-05-23

## 2024-05-29 ENCOUNTER — OFFICE VISIT (OUTPATIENT)
Dept: INTERNAL MEDICINE CLINIC | Facility: CLINIC | Age: 59
End: 2024-05-29

## 2024-05-29 ENCOUNTER — TELEPHONE (OUTPATIENT)
Dept: PSYCHIATRY | Facility: CLINIC | Age: 59
End: 2024-05-29

## 2024-05-29 ENCOUNTER — APPOINTMENT (OUTPATIENT)
Dept: LAB | Facility: CLINIC | Age: 59
End: 2024-05-29
Payer: MEDICARE

## 2024-05-29 VITALS
BODY MASS INDEX: 47.15 KG/M2 | DIASTOLIC BLOOD PRESSURE: 97 MMHG | HEIGHT: 65 IN | SYSTOLIC BLOOD PRESSURE: 165 MMHG | HEART RATE: 87 BPM | TEMPERATURE: 98.1 F | WEIGHT: 283 LBS

## 2024-05-29 DIAGNOSIS — E11.22 TYPE 2 DIABETES MELLITUS WITH STAGE 3 CHRONIC KIDNEY DISEASE AND HYPERTENSION (HCC): ICD-10-CM

## 2024-05-29 DIAGNOSIS — E55.9 VITAMIN D DEFICIENCY: ICD-10-CM

## 2024-05-29 DIAGNOSIS — N18.32 STAGE 3B CHRONIC KIDNEY DISEASE (HCC): ICD-10-CM

## 2024-05-29 DIAGNOSIS — I10 HYPERTENSION, UNSPECIFIED TYPE: Primary | ICD-10-CM

## 2024-05-29 DIAGNOSIS — N18.30 TYPE 2 DIABETES MELLITUS WITH STAGE 3 CHRONIC KIDNEY DISEASE AND HYPERTENSION (HCC): ICD-10-CM

## 2024-05-29 DIAGNOSIS — I12.9 TYPE 2 DIABETES MELLITUS WITH STAGE 3 CHRONIC KIDNEY DISEASE AND HYPERTENSION (HCC): ICD-10-CM

## 2024-05-29 DIAGNOSIS — E66.01 CLASS 3 SEVERE OBESITY DUE TO EXCESS CALORIES WITH SERIOUS COMORBIDITY AND BODY MASS INDEX (BMI) OF 45.0 TO 49.9 IN ADULT (HCC): ICD-10-CM

## 2024-05-29 DIAGNOSIS — R80.1 PERSISTENT PROTEINURIA: ICD-10-CM

## 2024-05-29 DIAGNOSIS — R41.3 MEMORY DEFICIT: ICD-10-CM

## 2024-05-29 LAB
ALBUMIN SERPL BCP-MCNC: 2.8 G/DL (ref 3.5–5)
ALP SERPL-CCNC: 95 U/L (ref 34–104)
ALT SERPL W P-5'-P-CCNC: 10 U/L (ref 7–52)
ANION GAP SERPL CALCULATED.3IONS-SCNC: 3 MMOL/L (ref 4–13)
AST SERPL W P-5'-P-CCNC: 13 U/L (ref 13–39)
BACTERIA UR QL AUTO: ABNORMAL /HPF
BILIRUB SERPL-MCNC: 0.25 MG/DL (ref 0.2–1)
BILIRUB UR QL STRIP: NEGATIVE
BUN SERPL-MCNC: 27 MG/DL (ref 5–25)
CALCIUM ALBUM COR SERPL-MCNC: 9.7 MG/DL (ref 8.3–10.1)
CALCIUM SERPL-MCNC: 8.7 MG/DL (ref 8.4–10.2)
CHLORIDE SERPL-SCNC: 107 MMOL/L (ref 96–108)
CLARITY UR: CLEAR
CO2 SERPL-SCNC: 28 MMOL/L (ref 21–32)
COLOR UR: COLORLESS
CREAT SERPL-MCNC: 2.8 MG/DL (ref 0.6–1.3)
CREAT UR-MCNC: 55.4 MG/DL
ERYTHROCYTE [DISTWIDTH] IN BLOOD BY AUTOMATED COUNT: 14.3 % (ref 11.6–15.1)
GFR SERPL CREATININE-BSD FRML MDRD: 23 ML/MIN/1.73SQ M
GLUCOSE P FAST SERPL-MCNC: 152 MG/DL (ref 65–99)
GLUCOSE UR STRIP-MCNC: ABNORMAL MG/DL
HCT VFR BLD AUTO: 39.1 % (ref 36.5–49.3)
HGB BLD-MCNC: 13.2 G/DL (ref 12–17)
HGB UR QL STRIP.AUTO: ABNORMAL
KETONES UR STRIP-MCNC: NEGATIVE MG/DL
LEUKOCYTE ESTERASE UR QL STRIP: NEGATIVE
MCH RBC QN AUTO: 26.2 PG (ref 26.8–34.3)
MCHC RBC AUTO-ENTMCNC: 33.8 G/DL (ref 31.4–37.4)
MCV RBC AUTO: 78 FL (ref 82–98)
MICROALBUMIN UR-MCNC: 2594.2 MG/L
MICROALBUMIN/CREAT 24H UR: 4683 MG/G CREATININE (ref 0–30)
NITRITE UR QL STRIP: NEGATIVE
NON-SQ EPI CELLS URNS QL MICRO: ABNORMAL /HPF
PH UR STRIP.AUTO: 6.5 [PH]
PLATELET # BLD AUTO: 207 THOUSANDS/UL (ref 149–390)
PMV BLD AUTO: 10.2 FL (ref 8.9–12.7)
POTASSIUM SERPL-SCNC: 5.4 MMOL/L (ref 3.5–5.3)
PROT SERPL-MCNC: 5.9 G/DL (ref 6.4–8.4)
PROT UR STRIP-MCNC: ABNORMAL MG/DL
RBC # BLD AUTO: 5.04 MILLION/UL (ref 3.88–5.62)
RBC #/AREA URNS AUTO: ABNORMAL /HPF
SODIUM SERPL-SCNC: 138 MMOL/L (ref 135–147)
SP GR UR STRIP.AUTO: 1.01 (ref 1–1.03)
UROBILINOGEN UR STRIP-ACNC: <2 MG/DL
WBC # BLD AUTO: 7.28 THOUSAND/UL (ref 4.31–10.16)
WBC #/AREA URNS AUTO: ABNORMAL /HPF

## 2024-05-29 PROCEDURE — 83036 HEMOGLOBIN GLYCOSYLATED A1C: CPT

## 2024-05-29 PROCEDURE — 36415 COLL VENOUS BLD VENIPUNCTURE: CPT

## 2024-05-29 PROCEDURE — 82043 UR ALBUMIN QUANTITATIVE: CPT

## 2024-05-29 PROCEDURE — G2211 COMPLEX E/M VISIT ADD ON: HCPCS | Performed by: INTERNAL MEDICINE

## 2024-05-29 PROCEDURE — 80053 COMPREHEN METABOLIC PANEL: CPT

## 2024-05-29 PROCEDURE — 85027 COMPLETE CBC AUTOMATED: CPT

## 2024-05-29 PROCEDURE — 82570 ASSAY OF URINE CREATININE: CPT

## 2024-05-29 PROCEDURE — 81001 URINALYSIS AUTO W/SCOPE: CPT

## 2024-05-29 PROCEDURE — 99215 OFFICE O/P EST HI 40 MIN: CPT | Performed by: INTERNAL MEDICINE

## 2024-05-29 RX ORDER — AMLODIPINE BESYLATE 5 MG/1
10 TABLET ORAL DAILY
Qty: 30 TABLET | Refills: 2 | Status: SHIPPED | OUTPATIENT
Start: 2024-05-29

## 2024-05-29 RX ORDER — ERGOCALCIFEROL 1.25 MG/1
50000 CAPSULE ORAL WEEKLY
Qty: 6 CAPSULE | Refills: 0 | Status: SHIPPED | OUTPATIENT
Start: 2024-05-29 | End: 2024-07-04

## 2024-05-29 NOTE — PROGRESS NOTES
[facility]  INTERNAL MEDICINE OFFICE VISIT     PATIENT INFORMATION     Tommie Taylor   58 y.o. male   MRN: [id number]    ASSESSMENT/PLAN     Diagnoses and all orders for this visit:    Hypertension, unspecified type  BP remains poorly controlled. Previously well controlled on lisinopril/HCTZ in the past but at some point was discontinued. Has been undergoing uptitration of amlodipine-benazepril. Cr elevated from baseline in April which was 2 days after uptitration of the ACEi component which raises concern for KATHERINE. Will hold ACE component for now and have patient follow up in one week.   -     amLODIPine (NORVASC) 5 mg tablet; Take 2 tablets (10 mg total) by mouth daily    Vitamin D deficiency  -     ergocalciferol (VITAMIN D2) 50,000 units; Take 1 capsule (50,000 Units total) by mouth once a week for 6 doses    Stage 3b chronic kidney disease (HCC)  CKD3b likely 2/2 HTN and DM. Has proteinuria which has significantly worsened in the past 12 months. Cr increased as above in setting of ACEi increase concerning for KATHERINE. Will re-check BMP and UA. Renal U/S with doppler to rule out renal lesion and KATHERINE.   -     Basic metabolic panel; Future  -     Urinalysis with microscopic; Future  -     US kidney and bladder; Future  -     VAS renal artery complete; Future    Type 2 Diabetes Mellitus   Patient seems to be not-compliant with GLP-1 therapy. He is unsure of his medication regimen as a whole. Previously not tolerant to metformin. Previously on insulin as well but no longer. Last A1c was 6.8 in April. Given Cr elevation will hold off on additional medications at this time. Pt strongly advised to bring all of his medications with him at his next visit.       Schedule a follow-up appointment in 1 week.    HEALTH MAINTENANCE     Immunization History   Administered Date(s) Administered    COVID-19 PFIZER VACCINE 0.3 ML IM 03/30/2021, 04/24/2021    INFLUENZA 12/11/2014, 10/09/2015,  12/21/2016, 09/28/2017    Influenza Quadrivalent, 6-35 Months IM 12/21/2016    Influenza, injectable, quadrivalent, preservative free 0.5 mL 11/06/2023    Influenza, recombinant, quadrivalent,injectable, preservative free 12/04/2018, 11/01/2019, 10/23/2020, 01/24/2022, 11/07/2022    Influenza, seasonal, injectable 12/11/2014, 10/09/2015, 09/28/2017    Pneumococcal Polysaccharide PPV23 12/04/2018     CHIEF COMPLAINT     Chief Complaint   Patient presents with    Follow-up      HISTORY OF PRESENT ILLNESS     58-year-old male with history of type 2 diabetes, DAISY, bipolar disorder type I, anxiety, panic disorder, hypertension, memory impairment, CKD 3B who presents for follow-up of chronic conditions.     For diabetes, patient's most recent A1c was 6.8 on April 15.  Appears to have been intolerant to metformin in the past.  Was on Trulicity and switched to Mounjaro at the last visit due to supply issues of Trulicity.  Patient is unsure about his diabetic medications.  States that he believes he has 1 of these medicines in the fridge but is not sure which.  He is unsure if he is using these weekly injections or not.     For his hypertension, he has been started on amlodipine Benazepril combination which was increased at the last visit.  Appears to have been on lisinopril hydrochlorothiazide in the past and was well-controlled but at some point was discontinued.  Blood pressure today is 165/97 and repeat was 150/85.  Patient reports that he wants to go back on lisinopril hydrochlorothiazide combination.    For his DAISY, patient was previously compliant with CPAP but his machine had a recall and he has not obtained a new one.     For his history of CKD 3B, patient is following with nephrology.  He has significant proteinuria with elevated urine albumin creatinine ratio.  Urine albumin was 2.6 grams and UACR was 6.5 g/g which are significantly increased from 2022. Baseline Cr appears to be 1.5-2 with most recent lab work in  "April demonstrating elevated creatinine to 2.5 which was 2 days after the increase in ACEi. Has not noted any new symptoms during this time including changes in urination frequency/color, flank pain, hematuria, LE swelling, SOB, chest pain. He has LE edema but states this is chronic and stable.     REVIEW OF SYSTEMS     Review of Systems   Constitutional:  Negative for chills and fever.   HENT:  Negative for ear pain and sore throat.    Eyes:  Negative for pain and visual disturbance.   Respiratory:  Negative for cough and shortness of breath.    Cardiovascular:  Negative for chest pain and palpitations.   Gastrointestinal:  Negative for abdominal pain and vomiting.   Genitourinary:  Negative for dysuria and hematuria.   Musculoskeletal:  Negative for arthralgias and back pain.   Skin:  Negative for color change and rash.   Neurological:  Negative for seizures and syncope.   All other systems reviewed and are negative.    OBJECTIVE     Vitals:    05/29/24 1108   BP: 165/97   BP Location: Left arm   Patient Position: Sitting   Cuff Size: Large   Pulse: 87   Temp: 98.1 °F (36.7 °C)   TempSrc: Temporal   Weight: 128 kg (283 lb)   Height: 5' 5\" (1.651 m)     Physical Exam  Vitals and nursing note reviewed.   Constitutional:       General: He is not in acute distress.     Appearance: He is well-developed. He is obese.   HENT:      Head: Normocephalic and atraumatic.   Eyes:      General: No scleral icterus.     Conjunctiva/sclera: Conjunctivae normal.   Cardiovascular:      Rate and Rhythm: Normal rate and regular rhythm.      Heart sounds: No murmur heard.  Pulmonary:      Effort: Pulmonary effort is normal. No respiratory distress.      Breath sounds: Normal breath sounds.   Abdominal:      Palpations: Abdomen is soft.      Tenderness: There is no abdominal tenderness.      Comments: No bruits heard   Musculoskeletal:         General: No swelling.      Cervical back: Neck supple.      Right lower leg: Edema present.    "   Left lower leg: Edema present.   Skin:     General: Skin is warm and dry.      Capillary Refill: Capillary refill takes less than 2 seconds.   Neurological:      Mental Status: He is alert and oriented to person, place, and time.   Psychiatric:         Mood and Affect: Mood normal.       CURRENT MEDICATIONS     Current Outpatient Medications:     amLODIPine (NORVASC) 5 mg tablet, Take 2 tablets (10 mg total) by mouth daily, Disp: 30 tablet, Rfl: 2    ergocalciferol (VITAMIN D2) 50,000 units, Take 1 capsule (50,000 Units total) by mouth once a week for 6 doses, Disp: 6 capsule, Rfl: 0    omeprazole (PriLOSEC) 20 mg delayed release capsule, Take 1 capsule (20 mg total) by mouth daily, Disp: 90 capsule, Rfl: 3    ammonium lactate (LAC-HYDRIN) 12 % cream, Apply topically as needed for dry skin, Disp: 385 g, Rfl: 0    atorvastatin (LIPITOR) 20 mg tablet, Take 1 tablet (20 mg total) by mouth daily, Disp: 90 tablet, Rfl: 3    Blood Glucose Monitoring Suppl (FREESTYLE LITE) ANTONIA, by Does not apply route 3 (three) times a day (Patient not taking: Reported on 4/24/2024), Disp: 1 each, Rfl: 0    Blood Glucose Monitoring Suppl (FreeStyle Lite) w/Device KIT, USE AS INSTRUCTED 4 TIMES A DAY, Disp: 1 kit, Rfl: 0    buPROPion (WELLBUTRIN) 100 mg tablet, Take 1 tablet (100 mg total) by mouth 2 (two) times a day Take bid--(once in the AM and once at 12 noon), Disp: 180 tablet, Rfl: 0    busPIRone (BUSPAR) 15 mg tablet, Take 1/2 - 1 tab po qd-bid, Disp: 180 tablet, Rfl: 0    clonazePAM (KlonoPIN) 1 mg tablet, TAKE 1 TABLET BY MOUTH EVERY DAY AS NEEDED FOR ANXIETY OR PANIC ATTACKS, Disp: 30 tablet, Rfl: 5    Continuous Blood Gluc  (FreeStyle [place] 14 Day [place]) ANTONIA, Use  to check BG at least 4 times a day, Disp: 1 each, Rfl: 0    Continuous Blood Gluc Sensor (FreeStyle [place] 14 Day Sensor) MISC, Apply sensor every 14 days to check BG at least 4 times a day (Patient not taking: Reported on 4/24/2024), Disp: 2 each,  "Rfl: 5    FREESTYLE LITE test strip, CHECK BLOOD SUGARS FOUR TIMES DAILY, Disp: 400 strip, Rfl: 3    glucose monitoring kit (FREESTYLE) monitoring kit, Use 1 each 4 (four) times a day Fine to substitute other brand if not covered by insurance, Disp: 1 each, Rfl: 0    Insulin Pen Needle 31G X 8 MM MISC, Check sugars three times a day, Disp: 100 each, Rfl: 1    INSULIN SYRINGE .5CC/29G 29G X 1/2\" 0.5 ML MISC, Use, Disp: , Rfl:     Lancets (FREESTYLE) lancets, USE THREE TIMES DAILY AS DIRECTED, Disp: 300 each, Rfl: 0    OLANZapine (ZyPREXA) 10 mg tablet, Take 1 tablet (10 mg total) by mouth daily at bedtime, Disp: 90 tablet, Rfl: 0    polyethylene glycol (GOLYTELY) 4000 mL solution, Take 4,000 mL by mouth once for 1 dose (Patient not taking: Reported on 12/5/2023), Disp: 4000 mL, Rfl: 0    traZODone (DESYREL) 50 mg tablet, Take 1/2 to 1 tab po qhs prn insomnia, Disp: 90 tablet, Rfl: 0    Valbenazine Tosylate (Ingrezza) 40 MG CAPS, Take 40 mg by mouth daily at bedtime, Disp: 90 capsule, Rfl: 0    vitamin B-12 (CYANOCOBALAMIN) 500 MCG TABS, Take 1 tablet (500 mcg total) by mouth daily (Patient not taking: Reported on 4/24/2024), Disp: 30 tablet, Rfl: 2    PAST MEDICAL & SURGICAL HISTORY     Past Medical History:   Diagnosis Date    ADHD, adult residual type     Anxiety     Anxiety     Bipolar 1 disorder (HCC)     Cataract     Left eye    CPAP (continuous positive airway pressure) dependence     Depression     Depression     Diabetes mellitus (HCC)     blood sugar 167 on admission    Equinus deformity of foot     GERD (gastroesophageal reflux disease)     Homicidal ideations     Hyperlipidemia     Hypertension     Microalbuminuria     Morbid obesity (HCC)     Neuropathy     Shortness of breath     Sleep apnea     CPAP at bedtime    Sleep apnea     Stroke (HCC)     Suicidal intent      Past Surgical History:   Procedure Laterality Date    CATARACT EXTRACTION      CATARACT EXTRACTION      COLONOSCOPY      HAND SURGERY Right " "    OTHER SURGICAL HISTORY      REPAIR OF SUPERFICIAL WOUND FACE    MO ESOPHAGOGASTRODUODENOSCOPY TRANSORAL DIAGNOSTIC N/A 2018    Procedure: ESOPHAGOGASTRODUODENOSCOPY (EGD);  Surgeon: Yinka Maier MD;  Location: [facility];  Service: Gastroenterology    MO ESOPHAGOGASTRODUODENOSCOPY TRANSORAL DIAGNOSTIC N/A 2018    Procedure: EGD AND COLONOSCOPY;  Surgeon: Yinka Maier MD;  Location: [facility];  Service: Gastroenterology     SOCIAL & FAMILY HISTORY     Social History     Socioeconomic History    Marital status: /Civil Union     Spouse name: Not on file    Number of children: 1    Years of education: Not on file    Highest education level: Not on file   Occupational History    Occupation: On disability   Tobacco Use    Smoking status: Former     Current packs/day: 0.00     Types: Cigarettes     Quit date:      Years since quittin.4    Smokeless tobacco: Current     Types: Chew    Tobacco comments:     pt \"Quit after approx over 25 years ago\"   Vaping Use    Vaping status: Never Used   Substance and Sexual Activity    Alcohol use: Not Currently     Comment: rarely    Drug use: Not Currently     Comment: quit 20 years ago    Sexual activity: Yes     Partners: Female     Birth control/protection: None     Comment: steady girlfriend   Other Topics Concern    Not on file   Social History Narrative     from spouse, technically still  but living with other partner, partner's father, and early 2019, his partner's daughter and boyfriend moved in with their two children.  Then the boyfriend moved out in 2019.   Then partner's daughter moved out approx 2021.  (Pt's partner has guardianship of the grandchildren per Pt).        Children: 1 stepdaughter         Education:    Pt denies any hx of learning disabilities and reached childhood milestones on time as far as he knows.  He wore braces for equinovarus but states he did not suffer delay in walking    Graduated High School " "1987--he was held back 3 times because \"I was just lazy, didn't do the work.\"    No college    Took some core classes in [employer] and worked as a volunteer  from approx [phone]             Substance Abuse History:     Pt drinks ETOH approx q 3-4 months but denies any h/o ETOH abuse.    Pt tried smoking Crack once in the past and has been smoking THC once q 2-3 months since 14y/o.     He denies other drug use, IVDA, addictions or drug abuse.         Social Determinants of Health     Financial Resource Strain: Low Risk  (4/15/2024)    Overall Financial Resource Strain (CARDIA)     Difficulty of Paying Living Expenses: Not hard at all   Food Insecurity: No Food Insecurity (5/29/2024)    Hunger Vital Sign     Worried About Running Out of Food in the Last Year: Never true     Ran Out of Food in the Last Year: Never true   Transportation Needs: No Transportation Needs (4/15/2024)    PRAPARE - Transportation     Lack of Transportation (Medical): No     Lack of Transportation (Non-Medical): No   Physical Activity: Inactive (9/14/2021)    Exercise Vital Sign     Days of Exercise per Week: 0 days     Minutes of Exercise per Session: 0 min   Stress: No Stress Concern Present (9/14/2021)    [place] [employer] - Occupational Stress Questionnaire     Feeling of Stress : Not at all   Social Connections: Moderately Isolated (9/14/2021)    Social Connection and Isolation Panel [NHANES]     Frequency of Communication with Friends and Family: More than three times a week     Frequency of Social Gatherings with Friends and Family: Once a week     Attends [employer] Services: Never     Active Member of Clubs or Organizations: No     Attends Club or Organization Meetings: Never     Marital Status: Living with partner   Intimate Partner Violence: Not At Risk (9/14/2021)    Humiliation, Afraid, Rape, and Kick questionnaire     Fear of Current or Ex-Partner: No     Emotionally Abused: No     Physically " "Abused: No     Sexually Abused: No   Housing Stability: Low Risk  (4/15/2024)    Housing Stability Vital Sign     Unable to Pay for Housing in the Last Year: No     Number of Places Lived in the Last Year: 1     Unstable Housing in the Last Year: No     Social History     Substance and Sexual Activity   Alcohol Use Not Currently    Comment: rarely       Social History     Substance and Sexual Activity   Drug Use Not Currently    Comment: quit 20 years ago     Social History     Tobacco Use   Smoking Status Former    Current packs/day: 0.00    Types: Cigarettes    Quit date:     Years since quittin.4   Smokeless Tobacco Current    Types: Chew   Tobacco Comments    pt \"Quit after approx over 25 years ago\"     Family History   Problem Relation Age of Onset    Diabetes Mother     Alcohol abuse Father     Diabetes Father     Hypertension Father     Lung cancer Father     Stroke Father     Cancer Father         lung    Alcohol abuse Sister     Depression Sister     Lymphoma Sister     Alcohol abuse Family     Alcohol abuse Sister     Alcohol abuse Sister     Cancer Sister     Heart disease Neg Hx     Thyroid disease Neg Hx              ==  Dave Au MD  [facility]    [address], [address]  [place] [address]  Office: [phone]  Fax: [phone]    " Rhombic Flap Text: The defect edges were debeveled with a #15 scalpel blade.  Given the location of the defect and the proximity to free margins a rhombic flap was deemed most appropriate.  Using a sterile surgical marker, an appropriate rhombic flap was drawn incorporating the defect.    The area thus outlined was incised deep to adipose tissue with a #15 scalpel blade.  The skin margins were undermined to an appropriate distance in all directions utilizing iris scissors.

## 2024-05-29 NOTE — TELEPHONE ENCOUNTER
Writer SHIRA offering 6/6 appointment slot at 3 pm. Please assist with scheduling upon return call.TY

## 2024-05-30 ENCOUNTER — TELEPHONE (OUTPATIENT)
Age: 59
End: 2024-05-30

## 2024-05-30 ENCOUNTER — TELEPHONE (OUTPATIENT)
Dept: NEPHROLOGY | Facility: CLINIC | Age: 59
End: 2024-05-30

## 2024-05-30 ENCOUNTER — HOSPITAL ENCOUNTER (OUTPATIENT)
Dept: ULTRASOUND IMAGING | Facility: HOSPITAL | Age: 59
Discharge: HOME | End: 2024-05-30
Payer: MEDICARE

## 2024-05-30 DIAGNOSIS — N18.32 STAGE 3B CHRONIC KIDNEY DISEASE (HCC): ICD-10-CM

## 2024-05-30 DIAGNOSIS — N18.32 STAGE 3B CHRONIC KIDNEY DISEASE (HCC): Primary | ICD-10-CM

## 2024-05-30 LAB
EST. AVERAGE GLUCOSE BLD GHB EST-MCNC: 151 MG/DL
HBA1C MFR BLD: 6.9 %

## 2024-05-30 PROCEDURE — 76775 US EXAM ABDO BACK WALL LIM: CPT

## 2024-05-30 NOTE — TELEPHONE ENCOUNTER
Talked with Dr. Au.  Concerned over worsening kidney function.  K 5.4.  Pt asymptomatic.  Will have Dr. No review and advise Dr. Au accordingly.

## 2024-05-30 NOTE — TELEPHONE ENCOUNTER
Dr. Au called bc he would like patient to either be seen by the office asap or he is going to refer the patient to the ER bc of his CKD. I called the [facility]isha office and Anahi answered but call when disconnected when she tried to transfer me.     Please call him back at [phone]. He couldn't wait for me to try to reach the office again. He said it is his personal cell and it's ok to leave a message bc he is rounding on patients.

## 2024-05-30 NOTE — TELEPHONE ENCOUNTER
Message left on patient's VM that K is on t he high side.  Per Dr. No, cut back on potassium in diet and repeat BMP in one week.  Order placed.      ----- Message from Steven No MD sent at 5/30/2024  8:14 AM EDT -----  His potassium is slightly elevated.  Can you have him be on a low potassium diet and have him repeat another BMP in 1 week please nonfasting  ----- Message -----  From: Lab, Background User  Sent: 5/29/2024   4:18 PM EDT  To: Steven No MD

## 2024-05-31 ENCOUNTER — HOSPITAL ENCOUNTER (OUTPATIENT)
Dept: NON INVASIVE DIAGNOSTICS | Facility: CLINIC | Age: 59
Discharge: HOME | End: 2024-05-31
Payer: MEDICARE

## 2024-05-31 DIAGNOSIS — N18.32 STAGE 3B CHRONIC KIDNEY DISEASE (HCC): ICD-10-CM

## 2024-05-31 PROCEDURE — 93975 VASCULAR STUDY: CPT | Performed by: INTERNAL MEDICINE

## 2024-05-31 PROCEDURE — 93975 VASCULAR STUDY: CPT

## 2024-06-03 ENCOUNTER — APPOINTMENT (OUTPATIENT)
Dept: LAB | Facility: CLINIC | Age: 59
End: 2024-06-03
Payer: MEDICARE

## 2024-06-03 DIAGNOSIS — N18.32 STAGE 3B CHRONIC KIDNEY DISEASE (HCC): ICD-10-CM

## 2024-06-03 LAB
ANION GAP SERPL CALCULATED.3IONS-SCNC: 5 MMOL/L (ref 4–13)
BUN SERPL-MCNC: 16 MG/DL (ref 5–25)
CALCIUM SERPL-MCNC: 8.2 MG/DL (ref 8.4–10.2)
CHLORIDE SERPL-SCNC: 107 MMOL/L (ref 96–108)
CO2 SERPL-SCNC: 25 MMOL/L (ref 21–32)
CREAT SERPL-MCNC: 2.78 MG/DL (ref 0.6–1.3)
GFR SERPL CREATININE-BSD FRML MDRD: 24 ML/MIN/1.73SQ M
GLUCOSE P FAST SERPL-MCNC: 210 MG/DL (ref 65–99)
POTASSIUM SERPL-SCNC: 4.6 MMOL/L (ref 3.5–5.3)
SODIUM SERPL-SCNC: 137 MMOL/L (ref 135–147)

## 2024-06-03 PROCEDURE — 36415 COLL VENOUS BLD VENIPUNCTURE: CPT

## 2024-06-03 PROCEDURE — 80048 BASIC METABOLIC PNL TOTAL CA: CPT

## 2024-06-04 ENCOUNTER — HOSPITAL ENCOUNTER (EMERGENCY)
Facility: HOSPITAL | Age: 59
Discharge: HOME | End: 2024-06-04
Attending: EMERGENCY MEDICINE | Admitting: EMERGENCY MEDICINE
Payer: MEDICARE

## 2024-06-04 ENCOUNTER — APPOINTMENT (EMERGENCY)
Dept: CT IMAGING | Facility: HOSPITAL | Age: 59
End: 2024-06-04
Payer: MEDICARE

## 2024-06-04 VITALS
DIASTOLIC BLOOD PRESSURE: 76 MMHG | SYSTOLIC BLOOD PRESSURE: 157 MMHG | HEART RATE: 90 BPM | OXYGEN SATURATION: 99 % | TEMPERATURE: 98.3 F | RESPIRATION RATE: 18 BRPM

## 2024-06-04 DIAGNOSIS — R51.9 ACUTE NONINTRACTABLE HEADACHE, UNSPECIFIED HEADACHE TYPE: Primary | ICD-10-CM

## 2024-06-04 DIAGNOSIS — R03.0 ELEVATED BLOOD PRESSURE READING: ICD-10-CM

## 2024-06-04 PROBLEM — N18.4 STAGE 4 CHRONIC KIDNEY DISEASE (HCC): Status: ACTIVE | Noted: 2019-04-17

## 2024-06-04 LAB
ALBUMIN SERPL BCP-MCNC: 2.6 G/DL (ref 3.5–5)
ALP SERPL-CCNC: 88 U/L (ref 34–104)
ALT SERPL W P-5'-P-CCNC: 7 U/L (ref 7–52)
ANION GAP SERPL CALCULATED.3IONS-SCNC: 7 MMOL/L (ref 4–13)
AST SERPL W P-5'-P-CCNC: 13 U/L (ref 13–39)
BASOPHILS # BLD AUTO: 0.04 THOUSANDS/ÂΜL (ref 0–0.1)
BASOPHILS NFR BLD AUTO: 1 % (ref 0–1)
BILIRUB SERPL-MCNC: 0.22 MG/DL (ref 0.2–1)
BUN SERPL-MCNC: 15 MG/DL (ref 5–25)
CALCIUM ALBUM COR SERPL-MCNC: 9.2 MG/DL (ref 8.3–10.1)
CALCIUM SERPL-MCNC: 8.1 MG/DL (ref 8.4–10.2)
CHLORIDE SERPL-SCNC: 105 MMOL/L (ref 96–108)
CO2 SERPL-SCNC: 23 MMOL/L (ref 21–32)
CREAT SERPL-MCNC: 2.8 MG/DL (ref 0.6–1.3)
EOSINOPHIL # BLD AUTO: 0.06 THOUSAND/ÂΜL (ref 0–0.61)
EOSINOPHIL NFR BLD AUTO: 1 % (ref 0–6)
ERYTHROCYTE [DISTWIDTH] IN BLOOD BY AUTOMATED COUNT: 14.6 % (ref 11.6–15.1)
GFR SERPL CREATININE-BSD FRML MDRD: 23 ML/MIN/1.73SQ M
GLUCOSE SERPL-MCNC: 243 MG/DL (ref 65–140)
HCT VFR BLD AUTO: 37.4 % (ref 36.5–49.3)
HGB BLD-MCNC: 12.4 G/DL (ref 12–17)
IMM GRANULOCYTES # BLD AUTO: 0.02 THOUSAND/UL (ref 0–0.2)
IMM GRANULOCYTES NFR BLD AUTO: 0 % (ref 0–2)
LYMPHOCYTES # BLD AUTO: 1.48 THOUSANDS/ÂΜL (ref 0.6–4.47)
LYMPHOCYTES NFR BLD AUTO: 22 % (ref 14–44)
MCH RBC QN AUTO: 25.7 PG (ref 26.8–34.3)
MCHC RBC AUTO-ENTMCNC: 33.2 G/DL (ref 31.4–37.4)
MCV RBC AUTO: 78 FL (ref 82–98)
MONOCYTES # BLD AUTO: 0.47 THOUSAND/ÂΜL (ref 0.17–1.22)
MONOCYTES NFR BLD AUTO: 7 % (ref 4–12)
NEUTROPHILS # BLD AUTO: 4.63 THOUSANDS/ÂΜL (ref 1.85–7.62)
NEUTS SEG NFR BLD AUTO: 69 % (ref 43–75)
NRBC BLD AUTO-RTO: 0 /100 WBCS
PLATELET # BLD AUTO: 180 THOUSANDS/UL (ref 149–390)
PMV BLD AUTO: 9.9 FL (ref 8.9–12.7)
POTASSIUM SERPL-SCNC: 4.1 MMOL/L (ref 3.5–5.3)
PROT SERPL-MCNC: 5.6 G/DL (ref 6.4–8.4)
RBC # BLD AUTO: 4.82 MILLION/UL (ref 3.88–5.62)
SODIUM SERPL-SCNC: 135 MMOL/L (ref 135–147)
WBC # BLD AUTO: 6.7 THOUSAND/UL (ref 4.31–10.16)

## 2024-06-04 PROCEDURE — 96374 THER/PROPH/DIAG INJ IV PUSH: CPT

## 2024-06-04 PROCEDURE — 80053 COMPREHEN METABOLIC PANEL: CPT

## 2024-06-04 PROCEDURE — 36415 COLL VENOUS BLD VENIPUNCTURE: CPT

## 2024-06-04 PROCEDURE — 99284 EMERGENCY DEPT VISIT MOD MDM: CPT | Performed by: EMERGENCY MEDICINE

## 2024-06-04 PROCEDURE — 85025 COMPLETE CBC W/AUTO DIFF WBC: CPT

## 2024-06-04 PROCEDURE — 99284 EMERGENCY DEPT VISIT MOD MDM: CPT

## 2024-06-04 PROCEDURE — 70450 CT HEAD/BRAIN W/O DYE: CPT

## 2024-06-04 RX ORDER — ONDANSETRON 2 MG/ML
4 INJECTION INTRAMUSCULAR; INTRAVENOUS ONCE
Status: COMPLETED | OUTPATIENT
Start: 2024-06-04 | End: 2024-06-04

## 2024-06-04 RX ORDER — ACETAMINOPHEN 325 MG/1
975 TABLET ORAL ONCE
Status: COMPLETED | OUTPATIENT
Start: 2024-06-04 | End: 2024-06-04

## 2024-06-04 RX ORDER — AMLODIPINE BESYLATE 5 MG/1
10 TABLET ORAL ONCE
Status: COMPLETED | OUTPATIENT
Start: 2024-06-04 | End: 2024-06-04

## 2024-06-04 RX ADMIN — AMLODIPINE BESYLATE 10 MG: 5 TABLET ORAL at 20:09

## 2024-06-04 RX ADMIN — ACETAMINOPHEN 975 MG: 325 TABLET, FILM COATED ORAL at 20:09

## 2024-06-04 RX ADMIN — ONDANSETRON 4 MG: 2 INJECTION INTRAMUSCULAR; INTRAVENOUS at 20:34

## 2024-06-04 NOTE — ASSESSMENT & PLAN NOTE
Pt unable to obtain GLP1 as it has been on back order at pharmacies. Had visit with weight management recently. Interested in bariatric surgery. Continue to follow with bariatrics.
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
## 2024-06-04 NOTE — ASSESSMENT & PLAN NOTE
Lab Results   Component Value Date    EGFR 24 06/03/2024    EGFR 23 05/29/2024    EGFR 27 04/19/2024    CREATININE 2.78 (H) 06/03/2024    CREATININE 2.80 (H) 05/29/2024    CREATININE 2.50 (H) 04/19/2024   Pt with worsening kidney function, now CKD stage 4. With proteinuria as evidenced by recent albumin/Cr 4600 and UA with 3+ protein. Recent mild hyperkalemia on labs with K 5.4, now improved with cutting back potassium supplement and low K diet.   Follows with nephrology, next appt scheduled on 7/9.   CKD is likely largely driven by uncontrolled HTN, however given quick sharp rise in Cr with significant proteinuria, would be concerned for other etiology as well, such as GN (though less likely as no RBCs on UA) or MM. Pt was also taking dayanna-seltzer for GI upset up to 6 times per day until last office visit when he was advised to stop, so large amounts of NSAIDs could also have contributed.   Will order SPEP, UPEP, and SHELL today. Remainder of workup per nephro.
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
## 2024-06-05 ENCOUNTER — OFFICE VISIT (OUTPATIENT)
Dept: INTERNAL MEDICINE CLINIC | Facility: CLINIC | Age: 59
End: 2024-06-05

## 2024-06-05 VITALS
BODY MASS INDEX: 46.82 KG/M2 | HEART RATE: 111 BPM | SYSTOLIC BLOOD PRESSURE: 168 MMHG | HEIGHT: 65 IN | WEIGHT: 281 LBS | TEMPERATURE: 98.1 F | DIASTOLIC BLOOD PRESSURE: 102 MMHG

## 2024-06-05 DIAGNOSIS — R00.0 TACHYCARDIA: ICD-10-CM

## 2024-06-05 DIAGNOSIS — N18.4 STAGE 4 CHRONIC KIDNEY DISEASE (HCC): Primary | ICD-10-CM

## 2024-06-05 DIAGNOSIS — E66.01 CLASS 3 SEVERE OBESITY DUE TO EXCESS CALORIES WITH SERIOUS COMORBIDITY AND BODY MASS INDEX (BMI) OF 45.0 TO 49.9 IN ADULT (HCC): ICD-10-CM

## 2024-06-05 DIAGNOSIS — G24.01 TARDIVE DYSKINESIA: ICD-10-CM

## 2024-06-05 DIAGNOSIS — E11.65 TYPE 2 DIABETES MELLITUS WITH HYPERGLYCEMIA, WITH LONG-TERM CURRENT USE OF INSULIN (HCC): ICD-10-CM

## 2024-06-05 DIAGNOSIS — H54.7 VISION DECREASED: ICD-10-CM

## 2024-06-05 DIAGNOSIS — Z79.4 TYPE 2 DIABETES MELLITUS WITH HYPERGLYCEMIA, WITH LONG-TERM CURRENT USE OF INSULIN (HCC): ICD-10-CM

## 2024-06-05 DIAGNOSIS — I10 HYPERTENSION, UNSPECIFIED TYPE: ICD-10-CM

## 2024-06-05 PROCEDURE — 99213 OFFICE O/P EST LOW 20 MIN: CPT | Performed by: INTERNAL MEDICINE

## 2024-06-05 RX ORDER — BENZTROPINE MESYLATE 2 MG/1
2 TABLET ORAL 2 TIMES DAILY
COMMUNITY

## 2024-06-05 RX ORDER — LABETALOL 100 MG/1
100 TABLET, FILM COATED ORAL 2 TIMES DAILY
Qty: 60 TABLET | Refills: 2 | Status: SHIPPED | OUTPATIENT
Start: 2024-06-05 | End: 2024-09-03

## 2024-06-05 RX ORDER — VALBENAZINE 40 MG/1
40 CAPSULE ORAL 2 TIMES DAILY
Qty: 90 CAPSULE | Refills: 0 | Status: SHIPPED | OUTPATIENT
Start: 2024-06-05
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
## 2024-06-05 NOTE — ASSESSMENT & PLAN NOTE
Lab Results   Component Value Date    HGBA1C 6.9 (H) 05/29/2024   Controlled. Previously on insulin but currently not on any medications for quite some time.

## 2024-06-05 NOTE — DISCHARGE INSTRUCTIONS
Return to the Emergency Department sooner if increased pain, fever, vomiting, lethargy, blurry vision, dizziness, weakness, difficulty walking or breathing, rash.     Based on your evaluation today, please continue to utilize your home prescription of antihypertensive medications to help with control of your symptoms.  Please follow-up with your primary care provider soon as possible.  If you do not have a primary care provider, you have been provided with contact information today in the emergency department to establish care with our family medicine practice.

## 2024-06-05 NOTE — ED NOTES
PT BP noted to be elevated again pt reports Pain from his HA starting to increase again at 5/10. Dr. Trent notified. Awaiting further orders.      Tamika Young RN  06/04/24 [id number]

## 2024-06-05 NOTE — ED NOTES
Pt C/O nausea and vomited X 1. Pt reports HA to be relieved after vomiting and reports feeling much better. BP noted to be lower. Md notified. Pt medicated with antiemetic as ordered. Pt resting comfortably on stretcher. Awaiting CT results.      Tamika Young RN  06/04/24 [id number]
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
## 2024-06-05 NOTE — ED NOTES
Pt moved arm during IV placement causing IV to blow. Unable to obtain IV at this time. Will continue to monitor.      Tamika Young RN  06/04/24 2015

## 2024-06-06 ENCOUNTER — TELEPHONE (OUTPATIENT)
Dept: BARIATRICS | Facility: CLINIC | Age: 59
End: 2024-06-06

## 2024-06-06 NOTE — TELEPHONE ENCOUNTER
ROSSANAM regarding Dr. Lilly departure. Let pt know he is to choose a new surgeon by next appt. A 15 min consultation will be given. Email with links of surgeons sent.
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
## 2024-06-12 ENCOUNTER — OFFICE VISIT (OUTPATIENT)
Dept: PSYCHIATRY | Facility: CLINIC | Age: 59
End: 2024-06-12
Payer: MEDICARE

## 2024-06-12 DIAGNOSIS — G47.00 INSOMNIA, UNSPECIFIED TYPE: ICD-10-CM

## 2024-06-12 DIAGNOSIS — F41.1 GENERALIZED ANXIETY DISORDER: Primary | ICD-10-CM

## 2024-06-12 DIAGNOSIS — F41.0 PANIC ATTACKS: ICD-10-CM

## 2024-06-12 DIAGNOSIS — G24.01 TARDIVE DYSKINESIA: ICD-10-CM

## 2024-06-12 DIAGNOSIS — F31.5 BIPOLAR DISORDER, CURRENT EPISODE DEPRESSED, SEVERE, WITH PSYCHOTIC FEATURES (HCC): ICD-10-CM

## 2024-06-12 DIAGNOSIS — R41.3 MEMORY DEFICIT: ICD-10-CM

## 2024-06-12 DIAGNOSIS — E55.9 VITAMIN D DEFICIENCY: ICD-10-CM

## 2024-06-12 PROBLEM — Z63.4 BEREAVEMENT: Status: RESOLVED | Noted: 2020-01-17 | Resolved: 2024-06-12

## 2024-06-12 PROCEDURE — 99214 OFFICE O/P EST MOD 30 MIN: CPT | Performed by: PHYSICIAN ASSISTANT

## 2024-06-12 RX ORDER — BUSPIRONE HYDROCHLORIDE 15 MG/1
TABLET ORAL
Qty: 180 TABLET | Refills: 0 | Status: SHIPPED | OUTPATIENT
Start: 2024-06-12

## 2024-06-12 RX ORDER — CLONAZEPAM 1 MG/1
TABLET ORAL
Qty: 30 TABLET | Refills: 5 | Status: SHIPPED | OUTPATIENT
Start: 2024-06-12

## 2024-06-12 RX ORDER — TRAZODONE HYDROCHLORIDE 50 MG/1
TABLET ORAL
Qty: 90 TABLET | Refills: 0 | Status: SHIPPED | OUTPATIENT
Start: 2024-06-12

## 2024-06-12 RX ORDER — BUPROPION HYDROCHLORIDE 100 MG/1
100 TABLET ORAL 2 TIMES DAILY
Qty: 180 TABLET | Refills: 0 | Status: SHIPPED | OUTPATIENT
Start: 2024-06-12

## 2024-06-12 RX ORDER — OLANZAPINE 10 MG/1
10 TABLET ORAL
Qty: 90 TABLET | Refills: 0 | Status: SHIPPED | OUTPATIENT
Start: 2024-06-12

## 2024-06-19 ENCOUNTER — SOCIAL WORK (OUTPATIENT)
Dept: BEHAVIORAL/MENTAL HEALTH CLINIC | Facility: CLINIC | Age: 59
End: 2024-06-19
Payer: MEDICARE

## 2024-06-19 ENCOUNTER — TELEPHONE (OUTPATIENT)
Dept: INTERNAL MEDICINE CLINIC | Facility: CLINIC | Age: 59
End: 2024-06-19

## 2024-06-19 DIAGNOSIS — F31.76 BIPOLAR I DISORDER, MOST RECENT EPISODE DEPRESSED, IN FULL REMISSION (HCC): Primary | ICD-10-CM

## 2024-06-19 DIAGNOSIS — F41.0 PANIC ATTACKS: ICD-10-CM

## 2024-06-19 DIAGNOSIS — G47.00 INSOMNIA, UNSPECIFIED TYPE: ICD-10-CM

## 2024-06-19 DIAGNOSIS — F41.1 GENERALIZED ANXIETY DISORDER: ICD-10-CM

## 2024-06-19 PROCEDURE — 90837 PSYTX W PT 60 MINUTES: CPT | Performed by: SOCIAL WORKER
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
## 2024-06-26 ENCOUNTER — TELEPHONE (OUTPATIENT)
Dept: NEPHROLOGY | Facility: CLINIC | Age: 59
End: 2024-06-26

## 2024-06-26 ENCOUNTER — TELEPHONE (OUTPATIENT)
Age: 59
End: 2024-06-26

## 2024-06-26 ENCOUNTER — OFFICE VISIT (OUTPATIENT)
Dept: INTERNAL MEDICINE CLINIC | Facility: CLINIC | Age: 59
End: 2024-06-26

## 2024-06-26 VITALS
WEIGHT: 289 LBS | SYSTOLIC BLOOD PRESSURE: 137 MMHG | BODY MASS INDEX: 48.09 KG/M2 | HEART RATE: 80 BPM | OXYGEN SATURATION: 98 % | TEMPERATURE: 97.9 F | DIASTOLIC BLOOD PRESSURE: 80 MMHG

## 2024-06-26 DIAGNOSIS — I10 HYPERTENSION, UNSPECIFIED TYPE: ICD-10-CM

## 2024-06-26 DIAGNOSIS — K22.2 LOWER ESOPHAGEAL RING (SCHATZKI): ICD-10-CM

## 2024-06-26 DIAGNOSIS — K21.9 GASTROESOPHAGEAL REFLUX DISEASE, UNSPECIFIED WHETHER ESOPHAGITIS PRESENT: ICD-10-CM

## 2024-06-26 DIAGNOSIS — N18.4 STAGE 4 CHRONIC KIDNEY DISEASE (HCC): Primary | ICD-10-CM

## 2024-06-26 DIAGNOSIS — Z12.11 SCREENING FOR COLON CANCER: Primary | ICD-10-CM

## 2024-06-26 DIAGNOSIS — G47.33 OSA (OBSTRUCTIVE SLEEP APNEA): ICD-10-CM

## 2024-06-26 DIAGNOSIS — G24.01 TARDIVE DYSKINESIA: ICD-10-CM

## 2024-06-26 DIAGNOSIS — F31.5 BIPOLAR I DISORDER, SEVERE, CURRENT OR MOST RECENT EPISODE DEPRESSED, WITH PSYCHOTIC FEATURES (HCC): ICD-10-CM

## 2024-06-26 DIAGNOSIS — E66.01 CLASS 3 SEVERE OBESITY DUE TO EXCESS CALORIES WITH SERIOUS COMORBIDITY AND BODY MASS INDEX (BMI) OF 45.0 TO 49.9 IN ADULT (HCC): ICD-10-CM

## 2024-06-26 DIAGNOSIS — E11.65 TYPE 2 DIABETES MELLITUS WITH HYPERGLYCEMIA, WITHOUT LONG-TERM CURRENT USE OF INSULIN (HCC): ICD-10-CM

## 2024-06-26 PROCEDURE — 99215 OFFICE O/P EST HI 40 MIN: CPT | Performed by: INTERNAL MEDICINE

## 2024-06-26 PROCEDURE — G2211 COMPLEX E/M VISIT ADD ON: HCPCS | Performed by: INTERNAL MEDICINE

## 2024-06-26 NOTE — TELEPHONE ENCOUNTER
Scheduled date of colonoscopy (as of today): 10/14/2024  Physician performing colonoscopy: DR JACKSON  Location of colonoscopy:[facility]  Bowel prep reviewed with patient: TBA  Instructions reviewed with patient by:TBA  Clearances: N/A    Patient no longer on trulicity

## 2024-06-26 NOTE — TELEPHONE ENCOUNTER
06/26/24  Screened by: Marisol Hanks MA    Referring Provider 6 MONTH REPEAT DUE TO POOR PREP, PAST DUE    Pre- Screening:     There is no height or weight on file to calculate BMI.  Has patient been referred for a routine screening Colonoscopy? yes  Is the patient between 45-75 years old? yes      Previous Colonoscopy yes   If yes:    Date: 8/24/2024    Facility:     Reason: REFLUX, WEIGHTLOSS        Does the patient want to see a Gastroenterologist prior to their procedure OR are they having any GI symptoms? no    Has the patient been hospitalized or had abdominal surgery in the past 6 months? no    Does the patient use supplemental oxygen? no    Does the patient take Coumadin, Lovenox, Plavix, Elliquis, Xarelto, or other blood thinning medication? no    Has the patient had a stroke, cardiac event, or stent placed in the past year? no        If patient is between 45yrs - 49yrs, please advise patient that we will have to confirm benefits & coverage with their insurance company for a routine screening colonoscopy.

## 2024-06-26 NOTE — TELEPHONE ENCOUNTER
Patients GI provider:  BRADY MARTÍNEZ    Number to return call: [phone]    Reason for call: Pts fiance calling to schedule repeat colon/egd. Please contact patient with prep as previous procedure was terminated due to poor prep.  Patient will need procedure directions.    Scheduled procedure/appointment date if applicable: Apt/procedure

## 2024-06-27 NOTE — PROGRESS NOTES
Ambulatory Visit  Name: Tommie Taylor      : 1965      MRN: [id number]  Encounter Provider: Jay Mays MD  Encounter Date: 2024   Encounter department: [facility]    Assessment & Plan   1. Stage 4 chronic kidney disease (HCC)  Patient with history of CKD with baseline creatinine of 1.5-2 with recent worsening to 2.8  Patient was taking a lot of Jolie- Tererro at that time which contains a lot of aspirin    Ordered SHELL, Spep, Upep previously but has not done it yet    Plan  Follow up with nephrology on   Encouraged to obtain labs before nephrology appointment  Advised to avoid any NSAIDs     2. DAISY (obstructive sleep apnea)  DAISY with CPAP compliance  Currently the CPAP is broken    Plan  Follow up with sleep medicine next month and arrange a new CPAP machine    3. Tardive dyskinesia  4. Bipolar I disorder, severe, current or most recent episode depressed, with psychotic features ([facility])  Patient with long term history of bipolar with pyschotic history   Follows with psychiatry  Stable on current regimen of trazodone, olazapine, clonazepam, bupropion and buspirone    Started on valbenazine for tremors and tardive dyskinesia with significant improvement    Plan  Continue above regimen and psychiatry follow up     5. Class 3 severe obesity due to excess calories with serious comorbidity and body mass index (BMI) of 45.0 to 49.9 in adult ([facility])  Follow up with bariatrics     6. Type 2 diabetes mellitus with hyperglycemia, without long-term current use of insulin ([facility])  Prior history of DM on multiple medications  Most recent A1c 6.9 off medications    Plan  Continue to monitor A1c and endocrinology follow up on 07/3    7. Gastroesophageal reflux disease, unspecified whether esophagitis present  8. Lower esophageal ring ([place])  Patient went for EGD and colon last year but had food in stomach and hence both were aborted  Plan was to repeat both once off GLP-1  for 2 weeks  Now off GLP-1 for quite a while    Plan  Advised to follow up with GI for EGD, colonoscopy    9. Hypertension, unspecified type  Current regimen  : amlodipine 10 mg OD; Labetalol 100 mg BID  BP today : 137/80    Plan  Continue current regimen and continue to monitor    Health Maintenance  DM eye exam pending - to address once other issues stabilized  Due for pneumococcal vaccine, shingrix, hep a vaccine - discuss next visit  Colonoscopy ordered  Uptodate on other age appropriate screening and immunization recommendations    Follow up in 3 months for chronic conditions    History of Present Illness     Tommie Taylor is a 59/M with PMH of hypertension, DM2, GERD, CKD, morbid obesity, bipolar disorder who presents for follow up of chronic conditions as outlined above      Review of Systems   Constitutional:  Negative for chills and fever.   HENT:  Negative for ear pain and sore throat.    Eyes:  Negative for pain and visual disturbance.   Respiratory:  Negative for cough and shortness of breath.    Cardiovascular:  Negative for chest pain and palpitations.   Gastrointestinal:  Negative for abdominal pain and vomiting.   Genitourinary:  Negative for dysuria and hematuria.   Musculoskeletal:  Negative for arthralgias and back pain.   Skin:  Negative for color change and rash.   Neurological:  Negative for seizures and syncope.   All other systems reviewed and are negative.    Past Medical History:   Diagnosis Date    ADHD, adult residual type     Anxiety     Anxiety     Bipolar 1 disorder (HCC)     Cataract     Left eye    CPAP (continuous positive airway pressure) dependence     Depression     Depression     Diabetes mellitus (HCC)     blood sugar 167 on admission    Equinus deformity of foot     GERD (gastroesophageal reflux disease)     Homicidal ideations     Hyperlipidemia     Hypertension     Microalbuminuria     Morbid obesity (HCC)     Neuropathy     Shortness of breath     Sleep apnea     CPAP at  "bedtime    Sleep apnea     Stroke (HCC)     Suicidal intent      Past Surgical History:   Procedure Laterality Date    CATARACT EXTRACTION      CATARACT EXTRACTION      COLONOSCOPY      HAND SURGERY Right     OTHER SURGICAL HISTORY      REPAIR OF SUPERFICIAL WOUND FACE    AK ESOPHAGOGASTRODUODENOSCOPY TRANSORAL DIAGNOSTIC N/A 2018    Procedure: ESOPHAGOGASTRODUODENOSCOPY (EGD);  Surgeon: Yinka Maier MD;  Location: [facility];  Service: Gastroenterology    AK ESOPHAGOGASTRODUODENOSCOPY TRANSORAL DIAGNOSTIC N/A 2018    Procedure: EGD AND COLONOSCOPY;  Surgeon: Yinka Maier MD;  Location: [facility];  Service: Gastroenterology     Family History   Problem Relation Age of Onset    Diabetes Mother     Alcohol abuse Father     Diabetes Father     Hypertension Father     Lung cancer Father     Stroke Father     Cancer Father         lung    Alcohol abuse Sister     Depression Sister     Lymphoma Sister     Alcohol abuse Family     Alcohol abuse Sister     Alcohol abuse Sister     Cancer Sister     Heart disease Neg Hx     Thyroid disease Neg Hx      Social History     Tobacco Use    Smoking status: Former     Current packs/day: 0.00     Types: Cigarettes     Quit date:      Years since quittin.5    Smokeless tobacco: Current     Types: Chew    Tobacco comments:     pt \"Quit after approx over 25 years ago\"   Vaping Use    Vaping status: Never Used   Substance and Sexual Activity    Alcohol use: Not Currently     Comment: rarely    Drug use: Not Currently     Comment: quit 20 years ago    Sexual activity: Yes     Partners: Female     Birth control/protection: None     Comment: steady girlfriend     Current Outpatient Medications on File Prior to Visit   Medication Sig    omeprazole (PriLOSEC) 20 mg delayed release capsule Take 1 capsule (20 mg total) by mouth daily    amLODIPine (NORVASC) 5 mg tablet Take 2 tablets (10 mg total) by mouth daily    ammonium lactate (LAC-HYDRIN) 12 % cream Apply topically " "as needed for dry skin    atorvastatin (LIPITOR) 20 mg tablet Take 1 tablet (20 mg total) by mouth daily    Blood Glucose Monitoring Suppl (FREESTYLE LITE) ANTONIA by Does not apply route 3 (three) times a day (Patient not taking: Reported on 4/24/2024)    Blood Glucose Monitoring Suppl (FreeStyle Lite) w/Device KIT USE AS INSTRUCTED 4 TIMES A DAY    buPROPion (WELLBUTRIN) 100 mg tablet Take 1 tablet (100 mg total) by mouth 2 (two) times a day Take bid--(once in the AM and once at 12 noon)    busPIRone (BUSPAR) 15 mg tablet Take 1/2 - 1 tab po qd-bid    clonazePAM (KlonoPIN) 1 mg tablet TAKE 1 TABLET BY MOUTH EVERY DAY AS NEEDED FOR ANXIETY OR PANIC ATTACKS    Continuous Blood Gluc  (FreeStyle [place] 14 Day [place]) ANTONIA Use  to check BG at least 4 times a day    Continuous Blood Gluc Sensor (FreeStyle [place] 14 Day Sensor) MISC Apply sensor every 14 days to check BG at least 4 times a day (Patient not taking: Reported on 4/24/2024)    ergocalciferol (VITAMIN D2) 50,000 units Take 1 capsule (50,000 Units total) by mouth once a week for 6 doses    FREESTYLE LITE test strip CHECK BLOOD SUGARS FOUR TIMES DAILY    glucose monitoring kit (FREESTYLE) monitoring kit Use 1 each 4 (four) times a day Fine to substitute other brand if not covered by insurance    Insulin Pen Needle 31G X 8 MM MISC Check sugars three times a day    INSULIN SYRINGE .5CC/29G 29G X 1/2\" 0.5 ML MISC Use    labetalol (NORMODYNE) 100 mg tablet Take 1 tablet (100 mg total) by mouth 2 (two) times a day    Lancets (FREESTYLE) lancets USE THREE TIMES DAILY AS DIRECTED    OLANZapine (ZyPREXA) 10 mg tablet Take 1 tablet (10 mg total) by mouth daily at bedtime    traZODone (DESYREL) 50 mg tablet Take 1/2 to 1 tab po qhs prn insomnia    Valbenazine Tosylate (Ingrezza) 40 MG CAPS Take 40 mg by mouth 2 (two) times a day    vitamin B-12 (CYANOCOBALAMIN) 500 MCG TABS Take 1 tablet (500 mcg total) by mouth daily (Patient not taking: Reported on " 4/24/2024)     Allergies   Allergen Reactions    Pollen Extract Nasal Congestion    Tetanus Toxoid Swelling     Immunization History   Administered Date(s) Administered    COVID-19 PFIZER VACCINE 0.3 ML IM 03/30/2021, 04/24/2021    INFLUENZA 12/11/2014, 10/09/2015, 12/21/2016, 09/28/2017    Influenza Quadrivalent, 6-35 Months IM 12/21/2016    Influenza, injectable, quadrivalent, preservative free 0.5 mL 11/06/2023    Influenza, recombinant, quadrivalent,injectable, preservative free 12/04/2018, 11/01/2019, 10/23/2020, 01/24/2022, 11/07/2022    Influenza, seasonal, injectable 12/11/2014, 10/09/2015, 09/28/2017    Pneumococcal Polysaccharide PPV23 12/04/2018     Objective     /80 (BP Location: Left arm, Patient Position: Sitting, Cuff Size: Large)   Pulse 80   Temp 97.9 °F (36.6 °C) (Temporal)   Wt 131 kg (289 lb)   SpO2 98%   BMI 48.09 kg/m²     Physical Exam  Constitutional:       General: He is not in acute distress.     Appearance: Normal appearance. He is obese. He is not diaphoretic.   HENT:      Head: Normocephalic and atraumatic.      Mouth/Throat:      Mouth: Mucous membranes are moist.   Eyes:      Pupils: Pupils are equal, round, and reactive to light.   Cardiovascular:      Rate and Rhythm: Normal rate and regular rhythm.      Pulses: Normal pulses.      Heart sounds: Normal heart sounds.   Pulmonary:      Effort: Pulmonary effort is normal.      Breath sounds: Normal breath sounds.   Abdominal:      General: Bowel sounds are normal.      Palpations: Abdomen is soft.   Musculoskeletal:      Right lower leg: No edema.      Left lower leg: No edema.   Skin:     General: Skin is warm.      Capillary Refill: Capillary refill takes less than 2 seconds.      Coloration: Skin is not jaundiced or pale.      Findings: No rash.   Neurological:      General: No focal deficit present.      Mental Status: He is alert and oriented to person, place, and time.   Psychiatric:         Mood and Affect: Mood  normal.         Behavior: Behavior normal.       Administrative Statements   I have spent a total time of 55 minutes on 06/27/24 In caring for this patient including Diagnostic results, Prognosis, Risks and benefits of tx options, Instructions for management, Patient and family education, Importance of tx compliance, Risk factor reductions, Impressions, Counseling / Coordination of care, Documenting in the medical record, Reviewing / ordering tests, medicine, procedures  , Obtaining or reviewing history  , and Communicating with other healthcare professionals .        Jay Mays MD  PGY-2, Internal Medicine  [facility]

## 2024-06-28 ENCOUNTER — TELEPHONE (OUTPATIENT)
Dept: INTERNAL MEDICINE CLINIC | Facility: CLINIC | Age: 59
End: 2024-06-28

## 2024-06-28 ENCOUNTER — APPOINTMENT (OUTPATIENT)
Dept: LAB | Facility: CLINIC | Age: 59
End: 2024-06-28
Payer: MEDICARE

## 2024-06-28 DIAGNOSIS — N18.4 STAGE 4 CHRONIC KIDNEY DISEASE (HCC): ICD-10-CM

## 2024-06-28 LAB — ANA SER QL IA: NEGATIVE

## 2024-06-28 PROCEDURE — 86038 ANTINUCLEAR ANTIBODIES: CPT

## 2024-06-28 PROCEDURE — 36415 COLL VENOUS BLD VENIPUNCTURE: CPT

## 2024-06-28 PROCEDURE — 86335 IMMUNFIX E-PHORSIS/URINE/CSF: CPT

## 2024-06-28 PROCEDURE — 84165 PROTEIN E-PHORESIS SERUM: CPT

## 2024-06-28 PROCEDURE — 84166 PROTEIN E-PHORESIS/URINE/CSF: CPT

## 2024-06-28 PROCEDURE — 86334 IMMUNOFIX E-PHORESIS SERUM: CPT

## 2024-06-28 NOTE — TELEPHONE ENCOUNTER
Patients endo referral expires 6/30/24. Please order new referral. Patient have an appointment 7/3.

## 2024-07-01 ENCOUNTER — CLINICAL SUPPORT (OUTPATIENT)
Dept: BARIATRICS | Facility: CLINIC | Age: 59
End: 2024-07-01

## 2024-07-01 VITALS — HEIGHT: 66 IN | BODY MASS INDEX: 46.27 KG/M2 | WEIGHT: 287.9 LBS

## 2024-07-01 DIAGNOSIS — Z01.818 PRE-OPERATIVE CLEARANCE: ICD-10-CM

## 2024-07-01 DIAGNOSIS — I12.9 TYPE 2 DIABETES MELLITUS WITH STAGE 3 CHRONIC KIDNEY DISEASE AND HYPERTENSION (HCC): ICD-10-CM

## 2024-07-01 DIAGNOSIS — R79.89 HIGH SERUM THYROID STIMULATING HORMONE (TSH): ICD-10-CM

## 2024-07-01 DIAGNOSIS — I10 HYPERTENSION, UNSPECIFIED TYPE: ICD-10-CM

## 2024-07-01 DIAGNOSIS — G47.33 OSA (OBSTRUCTIVE SLEEP APNEA): ICD-10-CM

## 2024-07-01 DIAGNOSIS — E78.2 MIXED HYPERLIPIDEMIA: ICD-10-CM

## 2024-07-01 DIAGNOSIS — N18.30 TYPE 2 DIABETES MELLITUS WITH STAGE 3 CHRONIC KIDNEY DISEASE AND HYPERTENSION (HCC): ICD-10-CM

## 2024-07-01 DIAGNOSIS — E66.01 CLASS 3 SEVERE OBESITY DUE TO EXCESS CALORIES WITH SERIOUS COMORBIDITY AND BODY MASS INDEX (BMI) OF 45.0 TO 49.9 IN ADULT (HCC): Primary | ICD-10-CM

## 2024-07-01 DIAGNOSIS — E11.65 TYPE 2 DIABETES MELLITUS WITH HYPERGLYCEMIA, WITHOUT LONG-TERM CURRENT USE OF INSULIN (HCC): Primary | ICD-10-CM

## 2024-07-01 DIAGNOSIS — K21.9 GASTROESOPHAGEAL REFLUX DISEASE, UNSPECIFIED WHETHER ESOPHAGITIS PRESENT: ICD-10-CM

## 2024-07-01 DIAGNOSIS — N18.32 STAGE 3B CHRONIC KIDNEY DISEASE (HCC): ICD-10-CM

## 2024-07-01 DIAGNOSIS — F31.5 BIPOLAR I DISORDER, SEVERE, CURRENT OR MOST RECENT EPISODE DEPRESSED, WITH PSYCHOTIC FEATURES (HCC): ICD-10-CM

## 2024-07-01 DIAGNOSIS — E11.22 TYPE 2 DIABETES MELLITUS WITH STAGE 3 CHRONIC KIDNEY DISEASE AND HYPERTENSION (HCC): ICD-10-CM

## 2024-07-01 DIAGNOSIS — F17.221 CHEWING TOBACCO NICOTINE DEPENDENCE IN REMISSION: ICD-10-CM

## 2024-07-01 PROCEDURE — RECHECK

## 2024-07-01 PROCEDURE — RECHECK: Performed by: DIETITIAN, REGISTERED

## 2024-07-01 NOTE — PROGRESS NOTES
Bariatric Behavioral Health Evaluation         Presenting Problem:  Tommie Taylor  is a 55 y.o.   male    :  1965   Patient presented with overall concerns of obesity.  Stated that weight has impacted quality of life and concerned with lack of mobility, chronic pain, and overall health.  Has attempted various weight loss plans in the past including exercise with success of 100# weight loss.   Patient is Interested in exploring bariatric surgery.as an option for  weight loss goals.         Is the patient seeking Bariatric Surgery Eval? Yes    Has thought about bariatric surgery for quiet some time and PCP just brought the topic up again.  Patient through he would explore more.   Would like to obtain benefits along with weight loss      Realizes Post- Op Requirements?   Yes-    Stated the RD reviewed everything and it made sense.      Pre-morbid level of function and history of present illness: patient has struggled with weight since childhood.  Many attempts at weight loss with limited success long term.  Current kidney disease,  DAISY  (untreated - CPAP is broke and pending another one.) and limited movement, GERD, 2DM      Psychiatric/Psychological Treatment Diagnosis: Anxiety:  BiPolar  With Rx :  Medication compliant.     Outpatient Counselor : Yes - Counselor  Too Lockett LCSW   In person, once a month     Psychiatrist:  Yes -  BRAD[place] Higgins-C:    [facility];  [address], [place] [address]:   Psychiatrist Phone [phone]   Fax; [phone]  Every three months for medication management      Have you had Inpatient Treatment? Yes    Approximately five years ago:    Can not recall the reason for admission:  Was self admitted.  Completed treatment and was discharged.     ED 2023 with hallucinations -      Drug and Alcohol treatment:  Denied:      Tobacco Use:   History of chewing tobacco.   Will have a nicotine test.     Family Constellation (include  relationship with each and Psych/Med HX)     Father  obesity and history of addiction and Siblings  history of addiction, tobacco use and mental health illness      Domestic Violence No     Abuse History:  none     Additional comments/stressors related to family/relationships/peer support:   Two living sister, no relationship.  One  sister.   Father is living , no relationship due to step mother.     Marie, S/O  (resides with patient.).   Otilia, great niece (resides with patient.)  Two grandchildren ( 6 & 9)   Marie's father in the home.     Cooking is shared.    On disability:  Does not really like to cook- but he cooks for the entire house.  Will eat out for lunch daily.       Typical day:  Doctor's appointments, sitting around the house, chores.  TV eating.    Stressors: Marie's father.       Physical/Psychological Assessment:      Appearance: appropriate  Sociability: average  Affect: appropriate  Mood: calm  Thought Process: coherent  Speech: normal  Content: no impairment  Orientation: person  Yes , place  Yes , time  Yes , normal attention span  Yes , normal memory  Yes   and normal judgement  Yes   Insight: emotional  good     Risk Assessment:      none          Risk of Harm to Self or Others:      Observation:      Interviews : this interview only      Access to weapons yes      Weapons secured by : safe      Based on the previous information, the client presents the following risk of harm to self or others: low             BARIATRIC SURGERY EDUCATION CHECKLIST     I have received education related to my bariatric surgery process and understand:     Patients may be required to complete a psychiatric evaluation and receive clearance for surgery from their psychiatrist.     Patients who undergo weight loss surgery are at higher risk of increased mental health concerns and suicide attempts.     Patients may be required to complete a full substance abuse evaluation and then complete all treatment  recommendations prior to surgery.     If diagnosis of abuse/dependence results, patient may be required to remain sober for one (1) year before having bariatric surgery.     Patient’s on psychiatric medications should check with their provider to discuss psychiatric medications and the changes in absorption.  Patient should discuss all time release medications with provider and take all medications as prescribed.     The recommendation is that there is no use of  any tobacco products, Hookah or  vapes for the bariatric post-operation patient.     Bariatric surgery patients should not consume alcohol as a post-operative patient as it may increase risk of numerous health conditions including but not limited to alcohol abuse and ulcers.     There is a possibility of weight regain if patient does not follow all program guidelines and recommendations.     Bariatric surgery patients should exercise thirty (30) to sixty (60) minutes per day to maintain post-surgical weight loss.     Research indicates that bariatric patients are more successful when they see a therapist for up to two (2) years post-op.     Patients will follow all medical and dietary recommendations provided.     Patient will keep all scheduled appointments and follow up with their physician for a minimum of five (5) years.     Patient will take all vitamins as recommended.  Post-operative vitamins are life-long.     Patient reviewed Bariatric Surgery Education Checklist and agrees they have received education on these issues.      Recommendations:   Decision for surgery deferred:  Psychiatric assessment requested     Note :    Patient presented for Behavioral Health Assessment for Bariatric surgery.  Mental Health diagnosis of Depression, Anxiety, BiPolar.   Follows psychiatry and counselor. One inpatient  admission about five years ago.  Medication prescribed by psychiatry and reports being medication compliant and feels the mediation is  helpful.   Denied drug and alcohol history.   Support system is his household:  Significant other and great niece.  On disability.  Tobacco use is current: chewing tobacco.  Patient educated regarding the impact of nicotine and alcohol on the post surgery bariatric patient. Patient has a positive family history of tobacco and alcohol addiction. Decision for surgery deferred:      Patient will be required to have psychiatric assessment  and a negative nicotine test.    However, patient can not schedule initial consult with the bariatric surgeon until there is a negative nicotine test in patient's chart.   Negative Nicotine test is due 90 days from today ( on 7/24/2024 ).  After the negative nicotine test is recorded, patient can proceed to schedule with the bariatric surgeon.      Ron Freitas LCSW

## 2024-07-01 NOTE — PROGRESS NOTES
"Bariatric Nutrition Assessment Note    Type of surgery    Preop  Surgery Date: TBD- pt does not have program requirement   Surgeon: Dr. Roge Alston    Per nephrology note:  Moderate dietary protein restriction 0.8 gm/kg    low 2 g sodium diet     Nutrition Assessment   Tommie Taylor  59 y.o.  male     Wt with BMI of 25: 155.6  Pre-Op Excess Wt: 158.4 lbs - starting weight 314 lbs   Ht 5' 6\" (1.676 m)   Wt 131 kg (287 lb 14.4 oz)   BMI 46.47 kg/m²      Wt Readings from Last 3 Encounters:   07/01/24 131 kg (287 lb 14.4 oz)   06/26/24 131 kg (289 lb)   06/05/24 127 kg (281 lb)        Keven Nichols Equation:    [id number]=[id number]  Weight Maintenance 2482  Estimated calories for weight loss 3647-1257 ( 1-2# per wk wt loss - sedentary )  Estimated protein needs 70-85 grams  grams (1.0-1.2 gms/kg IBW ) CKD 3b  OR estimated protein needs = 78.5 grams -105 grams/ day ( 0.6-0.8 grams/ kg actual body weight ) CKD3b  Estimated fluid needs 70 oz (30  ml/kg IBW )      NAFLD Fibrosis Score is: 3.752    NAFLD Score Correlated Fibrosis Severity   <-1.455 F0-F2   -1.455-0.676 Indeterminate Score   >0.676 F3-F4   **Fibrosis Severity Scale: F0 = no fibrosis; F1= mild fibrosis; F2 = moderate fibrosis; F3 = severe fibrosis; F4 = cirrhosis    NAFLD Score Component Values:  Component Value Date   Age: 59 y.o.     BMI: 46.47 kg/m²    IFG or DM: Yes    AST: 13 U/L 6/4/2024   ALT: 7 U/L 6/4/2024   Platelet: 180 Thousands/uL 6/4/2024   Albumin: 2.6 g/dL 6/4/2024        Weight History   Onset of Obesity: Childhood  Family history of obesity: No  Wt Loss Attempts: Exercise  Self Created Diets (Portion Control, Healthy Food Choices, etc.)  Patient has tried the above for 6 months or more with insufficient weight loss or weight regain, which is why patient has requested to be evaluated for weight loss surgery today  Maximum Wt Lost: 25 pounds with pool walking and decreasing portions   Walks 2-3 hours per day in community pool       Review of " "History and Medications   Past Medical History:   Diagnosis Date    ADHD, adult residual type     Anxiety     Anxiety     Bipolar 1 disorder (HCC)     Cataract     Left eye    CPAP (continuous positive airway pressure) dependence     Depression     Depression     Diabetes mellitus (HCC)     blood sugar 167 on admission    Equinus deformity of foot     GERD (gastroesophageal reflux disease)     Homicidal ideations     Hyperlipidemia     Hypertension     Microalbuminuria     Morbid obesity (HCC)     Neuropathy     Shortness of breath     Sleep apnea     CPAP at bedtime    Sleep apnea     Stroke (HCC)     Suicidal intent      Past Surgical History:   Procedure Laterality Date    CATARACT EXTRACTION      CATARACT EXTRACTION      COLONOSCOPY      HAND SURGERY Right     OTHER SURGICAL HISTORY      REPAIR OF SUPERFICIAL WOUND FACE    VT ESOPHAGOGASTRODUODENOSCOPY TRANSORAL DIAGNOSTIC N/A 2018    Procedure: ESOPHAGOGASTRODUODENOSCOPY (EGD);  Surgeon: Yinka Maier MD;  Location: [facility];  Service: Gastroenterology    VT ESOPHAGOGASTRODUODENOSCOPY TRANSORAL DIAGNOSTIC N/A 2018    Procedure: EGD AND COLONOSCOPY;  Surgeon: Yinka Maier MD;  Location: [facility];  Service: Gastroenterology     Social History     Socioeconomic History    Marital status: /Civil Union     Spouse name: Not on file    Number of children: 1    Years of education: Not on file    Highest education level: Not on file   Occupational History    Occupation: On disability   Tobacco Use    Smoking status: Former     Current packs/day: 0.00     Types: Cigarettes     Quit date:      Years since quittin.5    Smokeless tobacco: Current     Types: Chew    Tobacco comments:     pt \"Quit after approx over 25 years ago\"   Vaping Use    Vaping status: Never Used   Substance and Sexual Activity    Alcohol use: Not Currently     Comment: rarely    Drug use: Not Currently     Comment: quit 20 years ago    Sexual activity: Yes     Partners: " "Female     Birth control/protection: None     Comment: steady girlfriend   Other Topics Concern    Not on file   Social History Narrative     from spouse, technically still  but living with other partner, partner's father, and early 9/2019, his partner's daughter and boyfriend moved in with their two children.  Then the boyfriend moved out in 9/2019.   Then partner's daughter moved out approx 7/2021.  (Pt's partner has guardianship of the grandchildren per Pt).        Children: 1 stepdaughter         Education:    Pt denies any hx of learning disabilities and reached childhood milestones on time as far as he knows.  He wore braces for equinovarus but states he did not suffer delay in walking    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 because \"I was just lazy, didn't do the work.\"    No college    Took some core classes in [employer] and worked as a volunteer  from approx [id number]             Substance Abuse History:     Pt drinks ETOH approx q 3-4 months but denies any h/o ETOH abuse.    Pt tried smoking Crack once in the past and has been smoking THC once q 2-3 months since 12y/o.     He denies other drug use, IVDA, addictions or drug abuse.         Social Determinants of Health     Financial Resource Strain: Low Risk  (4/15/2024)    Overall Financial Resource Strain (CARDIA)     Difficulty of Paying Living Expenses: Not hard at all   Food Insecurity: No Food Insecurity (5/29/2024)    Hunger Vital Sign     Worried About Running Out of Food in the Last Year: Never true     Ran Out of Food in the Last Year: Never true   Transportation Needs: No Transportation Needs (4/15/2024)    PRAPARE - Transportation     Lack of Transportation (Medical): No     Lack of Transportation (Non-Medical): No   Physical Activity: Inactive (9/14/2021)    Exercise Vital Sign     Days of Exercise per Week: 0 days     Minutes of Exercise per Session: 0 min   Stress: No Stress Concern Present (9/14/2021) "    [place] [employer] - Occupational Stress Questionnaire     Feeling of Stress : Not at all   Social Connections: Moderately Isolated (9/14/2021)    Social Connection and Isolation Panel [NHANES]     Frequency of Communication with Friends and Family: More than three times a week     Frequency of Social Gatherings with Friends and Family: Once a week     Attends Tenriism Services: Never     Active Member of Clubs or Organizations: No     Attends Club or Organization Meetings: Never     Marital Status: Living with partner   Intimate Partner Violence: Not At Risk (9/14/2021)    Humiliation, Afraid, Rape, and Kick questionnaire     Fear of Current or Ex-Partner: No     Emotionally Abused: No     Physically Abused: No     Sexually Abused: No   Housing Stability: Low Risk  (4/15/2024)    Housing Stability Vital Sign     Unable to Pay for Housing in the Last Year: No     Number of Times Moved in the Last Year: 1     Homeless in the Last Year: No       Current Outpatient Medications:     omeprazole (PriLOSEC) 20 mg delayed release capsule, Take 1 capsule (20 mg total) by mouth daily, Disp: 90 capsule, Rfl: 3    amLODIPine (NORVASC) 5 mg tablet, Take 2 tablets (10 mg total) by mouth daily, Disp: 30 tablet, Rfl: 2    ammonium lactate (LAC-HYDRIN) 12 % cream, Apply topically as needed for dry skin, Disp: 385 g, Rfl: 0    atorvastatin (LIPITOR) 20 mg tablet, Take 1 tablet (20 mg total) by mouth daily, Disp: 90 tablet, Rfl: 3    Blood Glucose Monitoring Suppl (FREESTYLE LITE) ANTONIA, by Does not apply route 3 (three) times a day (Patient not taking: Reported on 4/24/2024), Disp: 1 each, Rfl: 0    Blood Glucose Monitoring Suppl (FreeStyle Lite) w/Device KIT, USE AS INSTRUCTED 4 TIMES A DAY, Disp: 1 kit, Rfl: 0    buPROPion (WELLBUTRIN) 100 mg tablet, Take 1 tablet (100 mg total) by mouth 2 (two) times a day Take bid--(once in the AM and once at 12 noon), Disp: 180 tablet, Rfl: 0    busPIRone (BUSPAR) 15 mg  "tablet, Take 1/2 - 1 tab po qd-bid, Disp: 180 tablet, Rfl: 0    clonazePAM (KlonoPIN) 1 mg tablet, TAKE 1 TABLET BY MOUTH EVERY DAY AS NEEDED FOR ANXIETY OR PANIC ATTACKS, Disp: 30 tablet, Rfl: 5    Continuous Blood Gluc  (FreeStyle Clarence 14 Day [place]) ANTONIA, Use  to check BG at least 4 times a day, Disp: 1 each, Rfl: 0    Continuous Blood Gluc Sensor (FreeStyle [place] 14 Day Sensor) MISC, Apply sensor every 14 days to check BG at least 4 times a day (Patient not taking: Reported on 4/24/2024), Disp: 2 each, Rfl: 5    ergocalciferol (VITAMIN D2) 50,000 units, Take 1 capsule (50,000 Units total) by mouth once a week for 6 doses, Disp: 6 capsule, Rfl: 0    FREESTYLE LITE test strip, CHECK BLOOD SUGARS FOUR TIMES DAILY, Disp: 400 strip, Rfl: 3    glucose monitoring kit (FREESTYLE) monitoring kit, Use 1 each 4 (four) times a day Fine to substitute other brand if not covered by insurance, Disp: 1 each, Rfl: 0    Insulin Pen Needle 31G X 8 MM MISC, Check sugars three times a day, Disp: 100 each, Rfl: 1    INSULIN SYRINGE .5CC/29G 29G X 1/2\" 0.5 ML MISC, Use, Disp: , Rfl:     labetalol (NORMODYNE) 100 mg tablet, Take 1 tablet (100 mg total) by mouth 2 (two) times a day, Disp: 60 tablet, Rfl: 2    Lancets (FREESTYLE) lancets, USE THREE TIMES DAILY AS DIRECTED, Disp: 300 each, Rfl: 0    OLANZapine (ZyPREXA) 10 mg tablet, Take 1 tablet (10 mg total) by mouth daily at bedtime, Disp: 90 tablet, Rfl: 0    polyethylene glycol (GOLYTELY) 4000 mL solution, Take 4,000 mL by mouth once for 1 dose, Disp: 4000 mL, Rfl: 0    traZODone (DESYREL) 50 mg tablet, Take 1/2 to 1 tab po qhs prn insomnia, Disp: 90 tablet, Rfl: 0    Valbenazine Tosylate (Ingrezza) 40 MG CAPS, Take 40 mg by mouth 2 (two) times a day, Disp: 90 capsule, Rfl: 0    vitamin B-12 (CYANOCOBALAMIN) 500 MCG TABS, Take 1 tablet (500 mcg total) by mouth daily (Patient not taking: Reported on 4/24/2024), Disp: 30 tablet, Rfl: 2    Food Intake and Lifestyle " Assessment   Food Intake Assessment completed via usual diet recall  Tu Th & Sat   Breakfast: 9:15 am - dialysis for his wife  2 donuts   M W F Zavala  - skips breakfast   Snack: 0   Lunch: 1 pm - hot dog  or hamburger or chicken or McDonalds or chips and soda pop   Fast Food several days per week for grandchildren and great niece ( regular soda )   Goes to pool and pool walking ( 3 to 4 hours )   Snack: sometimes snack - hot pretzel - shared with grandchildren and wife   Or ice pop shared antonio Roa   Dinner: cooks and preps together or her father will cook   Roast with carrots and mini potatoes   Snack: bag of pop corn   Beverage intake: water, sweetened beverages, and sweet tea sometime gatorade   Protein supplement: no  Estimated protein intake per day: 60-70 grams per day   Estimated fluid intake per day: 4-5 bottles per day or more   Meals eaten away from home: several days per week - donuts on wife dialysis day, 3 times per week FF for grandkids   Typical meal pattern: 3 meals per day and 1-2 snacks per day  Eating Behaviors: Consumption of high calorie/ high fat foods, Consumption of high calorie beverages, and Large portion sizes  Food allergies or intolerances:     Allergies   Allergen Reactions    Pollen Extract Nasal Congestion    Tetanus Toxoid Swelling     Cultural or Denominational considerations: N/A    Physical Assessment  Physical Activity  Types of exercise: Swimming  Water walks daily 3-4  hours   Current physical limitations: hip pain , joint pain     Psychosocial Assessment   Support systems: wife   Socioeconomic factors: disability   Lives with grandchildren, grand niece, wife and father in law     Nutrition Diagnosis  Diagnosis: Overweight / Obesity (NC-3.3), Excessive energy intake (NI-1.5), Excessive fat intake (NI-5.6.2), Inappropriate intake of carbohydrates (NI-5.8.3), and Undesirable food choices (NB-1.7)  Related to: Food and nutrition-related knowledge deficit, Physical  "inactivity, and Excessive energy intake  As Evidenced by: BMI >25 and Excessive energy intake     Nutrition Prescription: Recommend the following diet  2 gram sodium /moderate protein restriction per  nephrology     Interventions and Teaching   Discussed pre-op and post-op nutrition guidelines.       Patient educated and handouts provided.  Surgical changes to stomach / GI  Capacity of post-surgery stomach  Diet progression  Adequate hydration  Sugar and fat restriction to decrease \"dumping syndrome\"  Fat restriction to decrease steatorrhea  Expected weight loss  Weight loss plateaus/ possibility of weight regain  Exercise  Suggestions for pre-op diet  Nutrition considerations after surgery  Protein supplements  Meal planning and preparation  Appropriate carbohydrate, protein, and fat intake, and food/fluid choices to maximize safe weight loss, nutrient intake, and tolerance   Dietary and lifestyle changes  Possible problems with poor eating habits  Intuitive eating  Techniques for self monitoring and keeping daily food journal  Potential for food intolerance after surgery, and ways to deal with them including: lactose intolerance, nausea, reflux, vomiting, diarrhea, food intolerance, appetite changes, gas  Vitamin / Mineral supplementation of Multivitamin with minerals and Vitamin D( 5000 IU per day by nephrology )     Patient is not currently pregnant and doesn't desire to become pregnant a minimum of one year post-op    Education provided to: patient and and spouse ( wife )     Barriers to learning: Desire/Motivation and some memory issues     Readiness to change: contemplation    Prior research on procedure: discussed with provider    Comprehension: needs reinforcement and verbalizes understanding     Expected Compliance: fair  Recommendations  Pt is an appropriate candidate for surgery. Yes  Pt should make the following changes and then be reassessed for weight loss surgery:   Pt needs to be nicotine free prior " to  proceeding to surgery     Evaluation / Monitoring  Dietitian to Monitor: Eating pattern as discussed Tolerance of nutrition prescription Body weight Lab values Physical activity Bowel pattern    Goals  Eliminate sugar sweetened beverages, Exercise 30 minutes 5 times per week, Complete lession plans 1-6, Eat 3 meals per day, and Eliminate mindless snacking  Continue water walking on daily basis at community pool   Recommend an one per day daily multivitamin and mineral   Continue with prescription vitamin D, once finished 5000 IU per day per nephrology   Switch to water or calories free beverages from sweetened beverages  Discontinue 2 donuts on wife's dialysis days and choose a breakfast sandwich on english muffin  2 grams sodium diet       Time Spent:   1 Hour

## 2024-07-02 LAB
ALBUMIN SERPL ELPH-MCNC: 2.36 G/DL (ref 3.2–5.1)
ALBUMIN SERPL ELPH-MCNC: 47.1 % (ref 48–70)
ALBUMIN UR ELPH-MCNC: 71 %
ALPHA1 GLOB MFR UR ELPH: 5.1 %
ALPHA1 GLOB SERPL ELPH-MCNC: 0.28 G/DL (ref 0.15–0.47)
ALPHA1 GLOB SERPL ELPH-MCNC: 5.6 % (ref 1.8–7)
ALPHA2 GLOB MFR UR ELPH: 6.1 %
ALPHA2 GLOB SERPL ELPH-MCNC: 0.72 G/DL (ref 0.42–1.04)
ALPHA2 GLOB SERPL ELPH-MCNC: 14.3 % (ref 5.9–14.9)
B-GLOBULIN MFR UR ELPH: 7.4 %
BETA GLOB ABNORMAL SERPL ELPH-MCNC: 0.34 G/DL (ref 0.31–0.57)
BETA1 GLOB SERPL ELPH-MCNC: 6.8 % (ref 4.7–7.7)
BETA2 GLOB SERPL ELPH-MCNC: 6.6 % (ref 3.1–7.9)
BETA2+GAMMA GLOB SERPL ELPH-MCNC: 0.33 G/DL (ref 0.2–0.58)
GAMMA GLOB ABNORMAL SERPL ELPH-MCNC: 0.98 G/DL (ref 0.4–1.66)
GAMMA GLOB MFR UR ELPH: 10.4 %
GAMMA GLOB SERPL ELPH-MCNC: 19.6 % (ref 6.9–22.3)
IGG/ALB SER: 0.89 {RATIO} (ref 1.1–1.8)
INTERPRETATION UR IFE-IMP: NORMAL
INTERPRETATION UR IFE-IMP: NORMAL
PROT PATTERN SERPL ELPH-IMP: ABNORMAL
PROT PATTERN UR ELPH-IMP: NORMAL
PROT SERPL-MCNC: 5 G/DL (ref 6.4–8.2)
PROT UR-MCNC: 308.5 MG/DL

## 2024-07-02 PROCEDURE — 84165 PROTEIN E-PHORESIS SERUM: CPT | Performed by: STUDENT IN AN ORGANIZED HEALTH CARE EDUCATION/TRAINING PROGRAM

## 2024-07-02 PROCEDURE — 84166 PROTEIN E-PHORESIS/URINE/CSF: CPT | Performed by: STUDENT IN AN ORGANIZED HEALTH CARE EDUCATION/TRAINING PROGRAM

## 2024-07-02 PROCEDURE — 86335 IMMUNFIX E-PHORSIS/URINE/CSF: CPT | Performed by: STUDENT IN AN ORGANIZED HEALTH CARE EDUCATION/TRAINING PROGRAM

## 2024-07-02 PROCEDURE — 86334 IMMUNOFIX E-PHORESIS SERUM: CPT | Performed by: STUDENT IN AN ORGANIZED HEALTH CARE EDUCATION/TRAINING PROGRAM

## 2024-07-03 ENCOUNTER — OFFICE VISIT (OUTPATIENT)
Dept: MULTI SPECIALTY CLINIC | Facility: CLINIC | Age: 59
End: 2024-07-03

## 2024-07-03 VITALS
DIASTOLIC BLOOD PRESSURE: 79 MMHG | TEMPERATURE: 98 F | HEIGHT: 66 IN | BODY MASS INDEX: 46.67 KG/M2 | OXYGEN SATURATION: 98 % | HEART RATE: 80 BPM | SYSTOLIC BLOOD PRESSURE: 133 MMHG | WEIGHT: 290.38 LBS

## 2024-07-03 DIAGNOSIS — E11.65 TYPE 2 DIABETES MELLITUS WITH HYPERGLYCEMIA, WITHOUT LONG-TERM CURRENT USE OF INSULIN (HCC): ICD-10-CM

## 2024-07-03 DIAGNOSIS — I10 PRIMARY HYPERTENSION: Primary | ICD-10-CM

## 2024-07-03 DIAGNOSIS — E78.2 MIXED HYPERLIPIDEMIA: ICD-10-CM

## 2024-07-03 PROCEDURE — 99214 OFFICE O/P EST MOD 30 MIN: CPT | Performed by: STUDENT IN AN ORGANIZED HEALTH CARE EDUCATION/TRAINING PROGRAM

## 2024-07-03 NOTE — PROGRESS NOTES
Ambulatory Visit  Name: Tommie Taylor      : 1965      MRN: [id number]  Encounter Provider: [facility] ENDOCRINOLOG[facility]  Encounter Date: 7/3/2024   Encounter department: [facility] [place]    Assessment & Plan   1. Primary hypertension  Assessment & Plan:  Blood pressure goal is less than 130/80, blood pressure at today visit is 130/79, not on ACE or ARB's, he is following with nephrology team for CKD as well as proteinuria.    2. Type 2 diabetes mellitus with hyperglycemia, without long-term current use of insulin ([facility])  Assessment & Plan:    Lab Results   Component Value Date    HGBA1C 6.9 (H) 2024     Diabetes is controlled with A1c of 6.9%, currently not on antihyperglycemic medications, Trulicity was discontinued due to national shortage, we reviewed different options, he is agreeable to start taking Ozempic, 0.25 for 4 weeks and then 0.5 mg weekly.  SGLT2 inhibitors are an option as well given CKD and persistent proteinuria if nephrology team are agreeable.  He was not instructed to continue checking his blood sugar once a day in different times and bring his sugar log to next visit.  Podiatry and ophthalmology follow-up.  Return back in 6 months.  Labs prior to next week.  Orders:  -     Ambulatory Referral to Endocrinology  -     semaglutide, 0.25 or 0.5 mg/dose, (Ozempic, 0.25 or 0.5 MG/DOSE,) 2 mg/3 mL injection pen; Inject 0.375 mL (0.25 mg total) under the skin every 7 days for 30 days, THEN 0.75 mL (0.5 mg total) every 7 days.  3. Mixed hyperlipidemia  Assessment & Plan:  LDL goal less than 70.  Currently on Lipitor 20 mg daily which will be continued.      History of Present Illness     Tommie Taylor is a 59 y.o. male who presents for follow up for type 2 diabetes,  He is currently on no antihyperglycemic medications, could not get Trulucity due to back order,  Does perform home glucose monitoring daily and most recent A1c of 6.9%.  For hypertension he is  on amlodipine 10 mg daily.  For hyperlipidemia he is on Lipitor 20 mg daily.      Review of Systems   Constitutional:  Negative for appetite change, fatigue and unexpected weight change.   HENT:  Negative for trouble swallowing.    Eyes:  Negative for visual disturbance.   Cardiovascular:  Negative for chest pain and palpitations.   Gastrointestinal:  Negative for abdominal pain, constipation, diarrhea, nausea and vomiting.   Endocrine: Negative for polydipsia, polyphagia and polyuria.   Musculoskeletal:  Positive for arthralgias.     Medical History Reviewed by provider this encounter:       Current Outpatient Medications on File Prior to Visit   Medication Sig Dispense Refill    omeprazole (PriLOSEC) 20 mg delayed release capsule Take 1 capsule (20 mg total) by mouth daily 90 capsule 3    amLODIPine (NORVASC) 5 mg tablet Take 2 tablets (10 mg total) by mouth daily 30 tablet 2    ammonium lactate (LAC-HYDRIN) 12 % cream Apply topically as needed for dry skin 385 g 0    atorvastatin (LIPITOR) 20 mg tablet Take 1 tablet (20 mg total) by mouth daily 90 tablet 3    Blood Glucose Monitoring Suppl (FREESTYLE LITE) ANTONIA by Does not apply route 3 (three) times a day (Patient not taking: Reported on 4/24/2024) 1 each 0    Blood Glucose Monitoring Suppl (FreeStyle Lite) w/Device KIT USE AS INSTRUCTED 4 TIMES A DAY 1 kit 0    buPROPion (WELLBUTRIN) 100 mg tablet Take 1 tablet (100 mg total) by mouth 2 (two) times a day Take bid--(once in the AM and once at 12 noon) 180 tablet 0    busPIRone (BUSPAR) 15 mg tablet Take 1/2 - 1 tab po qd-bid 180 tablet 0    clonazePAM (KlonoPIN) 1 mg tablet TAKE 1 TABLET BY MOUTH EVERY DAY AS NEEDED FOR ANXIETY OR PANIC ATTACKS 30 tablet 5    Continuous Blood Gluc  (FreeStyle Clarence 14 Day [place]) ANTONIA Use  to check BG at least 4 times a day 1 each 0    Continuous Blood Gluc Sensor (FreeStyle [place] 14 Day Sensor) MISC Apply sensor every 14 days to check BG at least 4 times a day  "(Patient not taking: Reported on 4/24/2024) 2 each 5    ergocalciferol (VITAMIN D2) 50,000 units Take 1 capsule (50,000 Units total) by mouth once a week for 6 doses 6 capsule 0    FREESTYLE LITE test strip CHECK BLOOD SUGARS FOUR TIMES DAILY 400 strip 3    glucose monitoring kit (FREESTYLE) monitoring kit Use 1 each 4 (four) times a day Fine to substitute other brand if not covered by insurance 1 each 0    Insulin Pen Needle 31G X 8 MM MISC Check sugars three times a day 100 each 1    INSULIN SYRINGE .5CC/29G 29G X 1/2\" 0.5 ML MISC Use      labetalol (NORMODYNE) 100 mg tablet Take 1 tablet (100 mg total) by mouth 2 (two) times a day 60 tablet 2    Lancets (FREESTYLE) lancets USE THREE TIMES DAILY AS DIRECTED 300 each 0    OLANZapine (ZyPREXA) 10 mg tablet Take 1 tablet (10 mg total) by mouth daily at bedtime 90 tablet 0    polyethylene glycol (GOLYTELY) 4000 mL solution Take 4,000 mL by mouth once for 1 dose 4000 mL 0    traZODone (DESYREL) 50 mg tablet Take 1/2 to 1 tab po qhs prn insomnia 90 tablet 0    Valbenazine Tosylate (Ingrezza) 40 MG CAPS Take 40 mg by mouth 2 (two) times a day 90 capsule 0    vitamin B-12 (CYANOCOBALAMIN) 500 MCG TABS Take 1 tablet (500 mcg total) by mouth daily (Patient not taking: Reported on 4/24/2024) 30 tablet 2     No current facility-administered medications on file prior to visit.      Objective     /79 (BP Location: Left arm, Patient Position: Sitting, Cuff Size: Large)   Pulse 80   Temp 98 °F (36.7 °C) (Temporal)   Ht 5' 6\" (1.676 m)   Wt 132 kg (290 lb 6 oz)   BMI 46.87 kg/m²     Physical Exam  Vitals and nursing note reviewed.   Constitutional:       General: He is not in acute distress.     Appearance: He is well-developed.   HENT:      Head: Normocephalic and atraumatic.   Eyes:      Conjunctiva/sclera: Conjunctivae normal.   Cardiovascular:      Rate and Rhythm: Normal rate and regular rhythm.      Heart sounds: No murmur heard.  Pulmonary:      Effort: Pulmonary " effort is normal. No respiratory distress.      Breath sounds: Normal breath sounds.   Abdominal:      Palpations: Abdomen is soft.      Tenderness: There is no abdominal tenderness.   Musculoskeletal:         General: No swelling.      Cervical back: Neck supple.   Skin:     General: Skin is warm and dry.      Capillary Refill: Capillary refill takes less than 2 seconds.   Neurological:      Mental Status: He is alert.   Psychiatric:         Mood and Affect: Mood normal.       Component      Latest Ref Rng 5/29/2024   Sodium      135 - 147 mmol/L 138    Potassium      3.5 - 5.3 mmol/L 5.4 (H)    Chloride      96 - 108 mmol/L 107    Carbon Dioxide      21 - 32 mmol/L 28    ANION GAP      4 - 13 mmol/L 3 (L)    BUN      5 - 25 mg/dL 27 (H)    Creatinine      0.60 - 1.30 mg/dL 2.80 (H)    GLUCOSE, FASTING      65 - 99 mg/dL 152 (H)    Calcium      8.4 - 10.2 mg/dL 8.7    CORRECTED CALCIUM      8.3 - 10.1 mg/dL 9.7    AST      13 - 39 U/L 13    ALT      7 - 52 U/L 10    ALK PHOS      34 - 104 U/L 95    Total Protein      6.4 - 8.4 g/dL 5.9 (L)    Albumin      3.5 - 5.0 g/dL 2.8 (L)    Total Bilirubin      0.20 - 1.00 mg/dL 0.25    GFR, Calculated      ml/min/1.73sq m 23    EXT Creatinine Urine      Reference range not established. mg/dL 55.4    Albumin,U,Random      <20.0 mg/L 2,594.2 (H)    Albumin Creat Ratio      0 - 30 mg/g creatinine 4,683 (H)    Hemoglobin A1C      Normal 4.0-5.6%; PreDiabetic 5.7-6.4%; Diabetic >=6.5%; Glycemic control for adults with diabetes <7.0% % 6.9 (H)    eAG, EST AVG Glucose      mg/dl 151

## 2024-07-03 NOTE — ASSESSMENT & PLAN NOTE
Lab Results   Component Value Date    HGBA1C 6.9 (H) 05/29/2024     Diabetes is controlled with A1c of 6.9%, currently not on antihyperglycemic medications, Trulicity was discontinued due to national shortage, we reviewed different options, he is agreeable to start taking Ozempic, 0.25 for 4 weeks and then 0.5 mg weekly.  SGLT2 inhibitors are an option as well given CKD and persistent proteinuria if nephrology team are agreeable.  He was not instructed to continue checking his blood sugar once a day in different times and bring his sugar log to next visit.  Podiatry and ophthalmology follow-up.  Return back in 6 months.  Labs prior to next week.

## 2024-07-03 NOTE — ASSESSMENT & PLAN NOTE
Blood pressure goal is less than 130/80, blood pressure at today visit is 130/79, not on ACE or ARB's, he is following with nephrology team for CKD as well as proteinuria.

## 2024-07-09 ENCOUNTER — OFFICE VISIT (OUTPATIENT)
Dept: NEPHROLOGY | Facility: CLINIC | Age: 59
End: 2024-07-09
Payer: MEDICARE

## 2024-07-09 VITALS
HEART RATE: 80 BPM | HEIGHT: 66 IN | WEIGHT: 290 LBS | DIASTOLIC BLOOD PRESSURE: 70 MMHG | BODY MASS INDEX: 46.61 KG/M2 | SYSTOLIC BLOOD PRESSURE: 130 MMHG

## 2024-07-09 DIAGNOSIS — N18.30 TYPE 2 DIABETES MELLITUS WITH STAGE 3 CHRONIC KIDNEY DISEASE AND HYPERTENSION (HCC): ICD-10-CM

## 2024-07-09 DIAGNOSIS — I12.9 TYPE 2 DIABETES MELLITUS WITH STAGE 3 CHRONIC KIDNEY DISEASE AND HYPERTENSION (HCC): ICD-10-CM

## 2024-07-09 DIAGNOSIS — E55.9 VITAMIN D DEFICIENCY: ICD-10-CM

## 2024-07-09 DIAGNOSIS — K21.9 GASTROESOPHAGEAL REFLUX DISEASE, UNSPECIFIED WHETHER ESOPHAGITIS PRESENT: ICD-10-CM

## 2024-07-09 DIAGNOSIS — N18.32 STAGE 3B CHRONIC KIDNEY DISEASE (HCC): ICD-10-CM

## 2024-07-09 DIAGNOSIS — E66.01 CLASS 3 SEVERE OBESITY DUE TO EXCESS CALORIES WITH SERIOUS COMORBIDITY AND BODY MASS INDEX (BMI) OF 45.0 TO 49.9 IN ADULT (HCC): ICD-10-CM

## 2024-07-09 DIAGNOSIS — I10 HYPERTENSION, UNSPECIFIED TYPE: ICD-10-CM

## 2024-07-09 DIAGNOSIS — R79.89 ELEVATED SERUM CREATININE: ICD-10-CM

## 2024-07-09 DIAGNOSIS — F31.5 BIPOLAR I DISORDER, SEVERE, CURRENT OR MOST RECENT EPISODE DEPRESSED, WITH PSYCHOTIC FEATURES (HCC): ICD-10-CM

## 2024-07-09 DIAGNOSIS — E11.22 TYPE 2 DIABETES MELLITUS WITH STAGE 3 CHRONIC KIDNEY DISEASE AND HYPERTENSION (HCC): ICD-10-CM

## 2024-07-09 DIAGNOSIS — Z01.818 PRE-OPERATIVE CLEARANCE: ICD-10-CM

## 2024-07-09 DIAGNOSIS — F17.221 CHEWING TOBACCO NICOTINE DEPENDENCE IN REMISSION: ICD-10-CM

## 2024-07-09 DIAGNOSIS — N18.4 STAGE 4 CHRONIC KIDNEY DISEASE (HCC): Primary | ICD-10-CM

## 2024-07-09 DIAGNOSIS — G47.33 OSA (OBSTRUCTIVE SLEEP APNEA): ICD-10-CM

## 2024-07-09 DIAGNOSIS — E78.2 MIXED HYPERLIPIDEMIA: ICD-10-CM

## 2024-07-09 DIAGNOSIS — R80.1 PERSISTENT PROTEINURIA: ICD-10-CM

## 2024-07-09 PROCEDURE — 99214 OFFICE O/P EST MOD 30 MIN: CPT | Performed by: PHYSICIAN ASSISTANT

## 2024-07-09 NOTE — PATIENT INSTRUCTIONS
Chronic Kidney Disease stage 3/4- Baseline creatinine is 1.5-2.  Recheck Cystatin C.  Suspected etiology is due to diabetic nephropathy due to evidence of microalbuminuria as well as it being poorly controlled for many years, with a component of hypertension and possible obesity.  Unfortunately, creatinine appears to have progressed and is up to 2.8 most recently ranging from 2.5-2.8 since April 2024.    [facility]: referred    Elevated Creatinine- Repeat BMP and cystatin C.  Likely progression of disease.     Proteinuria- Remains above goal.  Continue to monitor.   Serologies: serum immunofixation negative for monoclonal gammopathy, UPEP negative.     Hypertension- Antihypertensive regimen includes amlodipine 10mg daily, and labetalol 100mg twice a day.  Avoid salt.  Avoid NSAIDs.  Stay active and exercise.  BP acceptable.     Vitamin D Deficiency- Recheck vitamin D level.  Significantly low in December 2023 and he was supposed to be taking cholecalciferol 5000 units daily.      Leg Swelling- please call the office if swelling worsens, if you have shortness of breath, or if your urine output decreases.   Consider diuretic once in steady state.     Diabetes mellitus type 2- follow up with PCP/endocrinology for management    Follow up with Dr. No in 3 months.  Please call the office with any questions or concerns.

## 2024-07-09 NOTE — PROGRESS NOTES
Assessment and Plan:    Tommie was seen today for follow-up.    Diagnoses and all orders for this visit:    Stage 4 chronic kidney disease (HCC)  -     Basic metabolic panel; Future  -     CBC; Future  -     Magnesium; Future  -     Phosphorus; Future  -     PTH, intact; Future  -     Protein / creatinine ratio, urine; Future  -     Vitamin D 25 hydroxy; Future  -     Cystatin C With eGFR; Future  -     Ambulatory Referral to CKD Education Program; Future  -     Basic metabolic panel; Future  -     Cystatin C With eGFR; Future    Vitamin D deficiency    Persistent proteinuria    Elevated serum creatinine      Chronic Kidney Disease stage 3/4- Baseline creatinine is 1.5-2.  Recheck Cystatin C.  Suspected etiology is due to diabetic nephropathy due to evidence of microalbuminuria as well as it being poorly controlled for many years, with a component of hypertension and possible obesity.  Unfortunately, creatinine appears to have progressed and is up to 2.8 most recently ranging from 2.5-2.8 since April 2024.    [facility]: referred  Comprehensive Kidney [facility]: consider at next visit    Elevated Creatinine- Repeat BMP and cystatin C.  Likely progression of disease.     Proteinuria- Remains above goal.  Continue to monitor.   Serologies: serum immunofixation negative for monoclonal gammopathy, UPEP negative.     Hypertension- Antihypertensive regimen includes amlodipine 10mg daily, and labetalol 100mg twice a day.  Avoid salt.  Avoid NSAIDs.  Stay active and exercise.  BP acceptable.     Vitamin D Deficiency- Recheck vitamin D level.  Significantly low in December 2023 and he was supposed to be taking cholecalciferol 5000 units daily.      Leg Swelling- please call the office if swelling worsens, if you have shortness of breath, or if your urine output decreases.   Consider diuretic once in steady state.     Diabetes mellitus type 2- follow up with PCP/endocrinology for management    Follow up with Dr. No in 3  months.  Please call the office with any questions or concerns.      Reason for Visit: Follow-up (CKD3)    HPI: Tommie Taylor is a 59 y.o. male who is here for follow up of chronic kidney disease.  Patient was last seen in December 2023 by Dr. No.  He has a poor appetite.  Weight appears stable recently.  States he does not watch his sugars.  He denies acute complaints.  He states he cannot afford OTC vitamin D so he didn't start it yet.  He denies SOB.    Knows what dialysis is as his girlfriend Marie is on dialysis at [facility].  He is willing to undergo dialysis when needed.     ROS: A complete review of systems was performed and was negative unless otherwise noted in the history of present illness.    Allergies:   Pollen extract and Tetanus toxoid    Medications:     Current Outpatient Medications:     amLODIPine (NORVASC) 5 mg tablet, Take 2 tablets (10 mg total) by mouth daily, Disp: 30 tablet, Rfl: 2    ammonium lactate (LAC-HYDRIN) 12 % cream, Apply topically as needed for dry skin, Disp: 385 g, Rfl: 0    atorvastatin (LIPITOR) 20 mg tablet, Take 1 tablet (20 mg total) by mouth daily, Disp: 90 tablet, Rfl: 3    Blood Glucose Monitoring Suppl (FreeStyle Lite) w/Device KIT, USE AS INSTRUCTED 4 TIMES A DAY, Disp: 1 kit, Rfl: 0    buPROPion (WELLBUTRIN) 100 mg tablet, Take 1 tablet (100 mg total) by mouth 2 (two) times a day Take bid--(once in the AM and once at 12 noon), Disp: 180 tablet, Rfl: 0    busPIRone (BUSPAR) 15 mg tablet, Take 1/2 - 1 tab po qd-bid, Disp: 180 tablet, Rfl: 0    clonazePAM (KlonoPIN) 1 mg tablet, TAKE 1 TABLET BY MOUTH EVERY DAY AS NEEDED FOR ANXIETY OR PANIC ATTACKS, Disp: 30 tablet, Rfl: 5    Continuous Blood Gluc  (FreeStyle Clarence 14 Day [place]) ANTONIA, Use  to check BG at least 4 times a day, Disp: 1 each, Rfl: 0    FREESTYLE LITE test strip, CHECK BLOOD SUGARS FOUR TIMES DAILY, Disp: 400 strip, Rfl: 3    glucose monitoring kit (FREESTYLE) monitoring kit, Use 1  "each 4 (four) times a day Fine to substitute other brand if not covered by insurance, Disp: 1 each, Rfl: 0    Insulin Pen Needle 31G X 8 MM MISC, Check sugars three times a day, Disp: 100 each, Rfl: 1    INSULIN SYRINGE .5CC/29G 29G X 1/2\" 0.5 ML MISC, Use, Disp: , Rfl:     labetalol (NORMODYNE) 100 mg tablet, Take 1 tablet (100 mg total) by mouth 2 (two) times a day, Disp: 60 tablet, Rfl: 2    Lancets (FREESTYLE) lancets, USE THREE TIMES DAILY AS DIRECTED, Disp: 300 each, Rfl: 0    OLANZapine (ZyPREXA) 10 mg tablet, Take 1 tablet (10 mg total) by mouth daily at bedtime, Disp: 90 tablet, Rfl: 0    omeprazole (PriLOSEC) 20 mg delayed release capsule, Take 1 capsule (20 mg total) by mouth daily, Disp: 90 capsule, Rfl: 3    semaglutide, 0.25 or 0.5 mg/dose, (Ozempic, 0.25 or 0.5 MG/DOSE,) 2 mg/3 mL injection pen, Inject 0.375 mL (0.25 mg total) under the skin every 7 days for 30 days, THEN 0.75 mL (0.5 mg total) every 7 days., Disp: 9 mL, Rfl: 0    traZODone (DESYREL) 50 mg tablet, Take 1/2 to 1 tab po qhs prn insomnia, Disp: 90 tablet, Rfl: 0    Valbenazine Tosylate (Ingrezza) 40 MG CAPS, Take 40 mg by mouth 2 (two) times a day, Disp: 90 capsule, Rfl: 0    Blood Glucose Monitoring Suppl (FREESTYLE LITE) ANTONIA, by Does not apply route 3 (three) times a day (Patient not taking: Reported on 4/24/2024), Disp: 1 each, Rfl: 0    Continuous Blood Gluc Sensor (FreeStyle Clarence 14 Day Sensor) MISC, Apply sensor every 14 days to check BG at least 4 times a day (Patient not taking: Reported on 4/24/2024), Disp: 2 each, Rfl: 5    polyethylene glycol (GOLYTELY) 4000 mL solution, Take 4,000 mL by mouth once for 1 dose, Disp: 4000 mL, Rfl: 0    vitamin B-12 (CYANOCOBALAMIN) 500 MCG TABS, Take 1 tablet (500 mcg total) by mouth daily (Patient not taking: Reported on 4/24/2024), Disp: 30 tablet, Rfl: 2    Past Medical History:   Diagnosis Date    ADHD, adult residual type     Anxiety     Anxiety     Bipolar 1 disorder (HCC)     Cataract  " "   Left eye    CPAP (continuous positive airway pressure) dependence     Depression     Depression     Diabetes mellitus (HCC)     blood sugar 167 on admission    Equinus deformity of foot     GERD (gastroesophageal reflux disease)     Homicidal ideations     Hyperlipidemia     Hypertension     Microalbuminuria     Morbid obesity (HCC)     Neuropathy     Shortness of breath     Sleep apnea     CPAP at bedtime    Sleep apnea     Stroke (HCC)     Suicidal intent      Past Surgical History:   Procedure Laterality Date    CATARACT EXTRACTION      CATARACT EXTRACTION      COLONOSCOPY      HAND SURGERY Right     OTHER SURGICAL HISTORY      REPAIR OF SUPERFICIAL WOUND FACE    [place] ESOPHAGOGASTRODUODENOSCOPY TRANSORAL DIAGNOSTIC N/A 06/14/2018    Procedure: ESOPHAGOGASTRODUODENOSCOPY (EGD);  Surgeon: Yinka Maier MD;  Location: [facility];  Service: Gastroenterology    [place] ESOPHAGOGASTRODUODENOSCOPY TRANSORAL DIAGNOSTIC N/A 09/12/2018    Procedure: EGD AND COLONOSCOPY;  Surgeon: Yinka Maier MD;  Location: [facility];  Service: Gastroenterology     Family History   Problem Relation Age of Onset    Diabetes Mother     Alcohol abuse Father     Diabetes Father     Hypertension Father     Lung cancer Father     Stroke Father     Cancer Father         lung    Alcohol abuse Sister     Depression Sister     Lymphoma Sister     Alcohol abuse Family     Alcohol abuse Sister     Alcohol abuse Sister     Cancer Sister     Heart disease Neg Hx     Thyroid disease Neg Hx       reports that he quit smoking about 39 years ago. His smoking use included cigarettes. His smokeless tobacco use includes chew. He reports that he does not currently use alcohol. He reports that he does not currently use drugs.    Physical Exam:   Vitals:    07/09/24 1304 07/09/24 1323   BP:  130/70   BP Location:  Right arm   Patient Position:  Sitting   Cuff Size:  Large   Pulse:  80   Weight: 132 kg (290 lb)    Height: 5' 6\" (1.676 m)      Body mass index is 46.81 " kg/m².    General: NAD  Neuro: AAO  Skin: no rash  Eyes: anicteric  ENMT: mm moist  Neck: no masses  Respiratory: CTAB  Cardiovascular: RRR  Extremities: + bilateral LE edema  Gastrointestinal: soft obese nt nd    Procedure:  No results found for this or any previous visit.    Lab Results   Component Value Date    GLUCOSE 315 (H) 12/15/2015    CALCIUM 8.1 (L) 06/04/2024     12/15/2015    K 4.1 06/04/2024    CO2 23 06/04/2024     06/04/2024    BUN 15 06/04/2024    CREATININE 2.80 (H) 06/04/2024     I have personally reviewed the blood work as stated above and in my note.  I have personally reviewed last renal note.

## 2024-07-10 ENCOUNTER — TELEPHONE (OUTPATIENT)
Dept: PSYCHIATRY | Facility: CLINIC | Age: 59
End: 2024-07-10

## 2024-07-10 NOTE — TELEPHONE ENCOUNTER
Psychiatric Assessment was received from [facility] to be fill out and return via fax. Form was placed in Anel Dowd's mailbox.

## 2024-07-10 NOTE — TELEPHONE ENCOUNTER
Psychiatric Assessment Form was received from [facility] to be fill out and return via fax. Form was placed in Too Lockett's mailbox.

## 2024-07-12 NOTE — TELEPHONE ENCOUNTER
I received the clearance form for bariatric surgery and attempted to reach Tommie to discuss it.  I had to leave a msg on his cell phone voice mail to call the office back.

## 2024-07-19 ENCOUNTER — APPOINTMENT (OUTPATIENT)
Dept: LAB | Facility: CLINIC | Age: 59
End: 2024-07-19
Payer: MEDICARE

## 2024-07-19 ENCOUNTER — OFFICE VISIT (OUTPATIENT)
Dept: BARIATRICS | Facility: CLINIC | Age: 59
End: 2024-07-19
Payer: MEDICARE

## 2024-07-19 VITALS
HEART RATE: 73 BPM | BODY MASS INDEX: 45.16 KG/M2 | TEMPERATURE: 97.7 F | SYSTOLIC BLOOD PRESSURE: 140 MMHG | WEIGHT: 281 LBS | HEIGHT: 66 IN | DIASTOLIC BLOOD PRESSURE: 90 MMHG

## 2024-07-19 DIAGNOSIS — G47.33 OSA (OBSTRUCTIVE SLEEP APNEA): ICD-10-CM

## 2024-07-19 DIAGNOSIS — N18.4 STAGE 4 CHRONIC KIDNEY DISEASE (HCC): ICD-10-CM

## 2024-07-19 DIAGNOSIS — K21.9 GASTROESOPHAGEAL REFLUX DISEASE, UNSPECIFIED WHETHER ESOPHAGITIS PRESENT: ICD-10-CM

## 2024-07-19 DIAGNOSIS — I10 PRIMARY HYPERTENSION: ICD-10-CM

## 2024-07-19 DIAGNOSIS — E66.01 CLASS 3 SEVERE OBESITY DUE TO EXCESS CALORIES WITH SERIOUS COMORBIDITY AND BODY MASS INDEX (BMI) OF 45.0 TO 49.9 IN ADULT (HCC): ICD-10-CM

## 2024-07-19 DIAGNOSIS — N18.32 STAGE 3B CHRONIC KIDNEY DISEASE (HCC): ICD-10-CM

## 2024-07-19 DIAGNOSIS — N18.30 TYPE 2 DIABETES MELLITUS WITH STAGE 3 CHRONIC KIDNEY DISEASE AND HYPERTENSION (HCC): ICD-10-CM

## 2024-07-19 DIAGNOSIS — R79.89 HIGH SERUM THYROID STIMULATING HORMONE (TSH): ICD-10-CM

## 2024-07-19 DIAGNOSIS — E78.2 MIXED HYPERLIPIDEMIA: ICD-10-CM

## 2024-07-19 DIAGNOSIS — I12.9 TYPE 2 DIABETES MELLITUS WITH STAGE 3 CHRONIC KIDNEY DISEASE AND HYPERTENSION (HCC): ICD-10-CM

## 2024-07-19 DIAGNOSIS — E11.22 TYPE 2 DIABETES MELLITUS WITH STAGE 3 CHRONIC KIDNEY DISEASE AND HYPERTENSION (HCC): ICD-10-CM

## 2024-07-19 DIAGNOSIS — F31.5 BIPOLAR I DISORDER, SEVERE, CURRENT OR MOST RECENT EPISODE DEPRESSED, WITH PSYCHOTIC FEATURES (HCC): ICD-10-CM

## 2024-07-19 DIAGNOSIS — I10 HYPERTENSION, UNSPECIFIED TYPE: ICD-10-CM

## 2024-07-19 DIAGNOSIS — E11.65 TYPE 2 DIABETES MELLITUS WITH HYPERGLYCEMIA, WITHOUT LONG-TERM CURRENT USE OF INSULIN (HCC): ICD-10-CM

## 2024-07-19 DIAGNOSIS — E66.01 CLASS 3 SEVERE OBESITY DUE TO EXCESS CALORIES WITH SERIOUS COMORBIDITY AND BODY MASS INDEX (BMI) OF 45.0 TO 49.9 IN ADULT (HCC): Primary | ICD-10-CM

## 2024-07-19 DIAGNOSIS — F17.221 CHEWING TOBACCO NICOTINE DEPENDENCE IN REMISSION: ICD-10-CM

## 2024-07-19 DIAGNOSIS — Z01.818 PRE-OPERATIVE CLEARANCE: ICD-10-CM

## 2024-07-19 LAB
ANION GAP SERPL CALCULATED.3IONS-SCNC: 4 MMOL/L (ref 4–13)
BUN SERPL-MCNC: 21 MG/DL (ref 5–25)
CALCIUM SERPL-MCNC: 8.5 MG/DL (ref 8.4–10.2)
CHLORIDE SERPL-SCNC: 104 MMOL/L (ref 96–108)
CO2 SERPL-SCNC: 27 MMOL/L (ref 21–32)
CREAT SERPL-MCNC: 2.89 MG/DL (ref 0.6–1.3)
GFR SERPL CREATININE-BSD FRML MDRD: 22 ML/MIN/1.73SQ M
GLUCOSE P FAST SERPL-MCNC: 351 MG/DL (ref 65–99)
POTASSIUM SERPL-SCNC: 4.6 MMOL/L (ref 3.5–5.3)
SODIUM SERPL-SCNC: 135 MMOL/L (ref 135–147)
T4 FREE SERPL-MCNC: 0.64 NG/DL (ref 0.61–1.12)
TSH SERPL DL<=0.05 MIU/L-ACNC: 7.41 UIU/ML (ref 0.45–4.5)

## 2024-07-19 PROCEDURE — 80048 BASIC METABOLIC PNL TOTAL CA: CPT

## 2024-07-19 PROCEDURE — 82610 CYSTATIN C: CPT

## 2024-07-19 PROCEDURE — 84439 ASSAY OF FREE THYROXINE: CPT

## 2024-07-19 PROCEDURE — 84443 ASSAY THYROID STIM HORMONE: CPT

## 2024-07-19 PROCEDURE — 36415 COLL VENOUS BLD VENIPUNCTURE: CPT

## 2024-07-19 PROCEDURE — 80323 ALKALOIDS NOS: CPT

## 2024-07-19 PROCEDURE — 99214 OFFICE O/P EST MOD 30 MIN: CPT | Performed by: SURGERY

## 2024-07-19 NOTE — PROGRESS NOTES
- Height/Weight:   66 , 281 lb  - BMI:  45.4  - Medical conditions related to obesity: yes  Diabetes, Hypertension  on meds  - Does the patient smoke/vape (nicotine, marijuana, hookah, etc): no  - StopBANG:    Sleep apnea W ( CPAP )  - Does the patient currently take a weight loss medication: no

## 2024-07-19 NOTE — PROGRESS NOTES
BARIATRIC INITIAL CONSULT - BARIATRIC SURGERY    Tommie Taylor 59 y.o. male MRN: [id number]  Unit/Bed#:  Encounter: [id number]      HPI:  Tommie Taylor is a 59 y.o. male who presents with a longstanding history of morbid obesity and inability to sustain a meaningful weight loss.    Here today to discuss bariatric options.    Visit type: initial visit    Symptoms: excess weight and inability to loss weight    Associated Symptoms: depressed mood and anxiety    Associated Conditions: hyperlipidemia, sleep apnea, abdominal obesity, and hypergycemia  Disease Complications: diabetes, hypertension, and sleep apnea  Weight Loss Interest: high  Previous Diet Trials: low calorie     Exercise Frequency:infrequency  Types of Exercise: walking    Review of Systems   All other systems reviewed and are negative.      Historical Information   Past Medical History:   Diagnosis Date    ADHD, adult residual type     Anxiety     Anxiety     Bipolar 1 disorder (HCC)     Cataract     Left eye    CPAP (continuous positive airway pressure) dependence     Depression     Depression     Diabetes mellitus (HCC)     blood sugar 167 on admission    Equinus deformity of foot     GERD (gastroesophageal reflux disease)     Homicidal ideations     Hyperlipidemia     Hypertension     Microalbuminuria     Morbid obesity (HCC)     Neuropathy     Shortness of breath     Sleep apnea     CPAP at bedtime    Sleep apnea     Stroke (HCC)     Suicidal intent      Past Surgical History:   Procedure Laterality Date    CATARACT EXTRACTION      CATARACT EXTRACTION      COLONOSCOPY      HAND SURGERY Right     OTHER SURGICAL HISTORY      REPAIR OF SUPERFICIAL WOUND FACE    [place] ESOPHAGOGASTRODUODENOSCOPY TRANSORAL DIAGNOSTIC N/A 06/14/2018    Procedure: ESOPHAGOGASTRODUODENOSCOPY (EGD);  Surgeon: Yinka Maier MD;  Location: [facility];  Service: Gastroenterology    [place] ESOPHAGOGASTRODUODENOSCOPY TRANSORAL DIAGNOSTIC N/A 09/12/2018    Procedure: EGD AND  "COLONOSCOPY;  Surgeon: Yinka Maier MD;  Location: [facility];  Service: Gastroenterology     Social History   Social History     Substance and Sexual Activity   Alcohol Use Not Currently    Comment: rarely     Social History     Substance and Sexual Activity   Drug Use Not Currently    Comment: quit 20 years ago     Social History     Tobacco Use   Smoking Status Former    Current packs/day: 0.00    Types: Cigarettes    Quit date:     Years since quittin.5   Smokeless Tobacco Current    Types: Chew   Tobacco Comments    pt \"Quit after approx over 25 years ago\"     Family History: non-contributory    Meds/Allergies   all medications and allergies reviewed  Allergies   Allergen Reactions    Pollen Extract Nasal Congestion    Tetanus Toxoid Swelling       Objective     Current Vitals:   Blood Pressure: 140/90 (24 1332)  Pulse: 73 (24 1332)  Temperature: 97.7 °F (36.5 °C) (24 133)  Temp Source: Tympanic (24 133)  Height: 5' 6\" (167.6 cm) (24 133)  Weight - Scale: 127 kg (281 lb) (24)      Invasive Devices       None                   Physical Exam  Constitutional:       General: He is not in acute distress.     Appearance: He is well-developed.   Neurological:      Mental Status: He is alert and oriented to person, place, and time.   Psychiatric:         Behavior: Behavior normal.         Thought Content: Thought content normal.         Judgment: Judgment normal.         Lab Results: I have personally reviewed pertinent lab results.    Imaging: I have personally reviewed pertinent reports.    EKG, Pathology, and Other Studies: I have personally reviewed pertinent reports.        Assessment/PLAN:    59 y.o. male with a long standing h/o of obesity and inability to sustain any meaningful weight loss on his own despite several attempts.    He is interested in the Laparoscopic Sleeve gastrectomy.  I have discussed both option with him today.  He started the process with " Dr Kaur and now he is transitioning to me.    As a part of his pre op evaluation, he will be referred to a cardiologist for risk stratification and for a sleep evaluation and consult to rule out obstructive sleep apnea if deemed necessary based on his score.    He needs an EGD to evaluate the anatomy of his GI tract as the last one was aborted due to retained food.    I have spent over 30 minutes with him face to face in the office today discussing his options and details of the surgery. We have seen an animation of the surgery on the computer that illustrates how the operation is done and how the anatomy will be altered with the procedure. Over 50% of this was coordinating care.    [facility]/benefit calculator and we have discussed the results as part of the preop education.  I have discussed and educated the patient with regards to the components of our multidisciplinary program and the importance of compliance and follow up in the post operative period.    He was given the opportunity to ask questions and I have answered all of them.    The patient was also instructed with regards to the importance of behavior modification, nutritional counseling, support meeting attendance and lifestyle changes that are important to ensure success.    Although there is a great statistical chance of improvement or even resolution of most of his associated comorbidities, the results vary from patient to patient and they largely depend on his commitment and compliance.     I have reviewed instructions for stopping or tapering anti-obesity medications prior to surgery.      I have encouraged him to lose 12 lbs prior to the operation.      Roge Alston MD  7/19/2024  1:34 PM

## 2024-07-20 NOTE — TELEPHONE ENCOUNTER
Second attempt to reach Tommie regarding bariatric clearance.  I left msg on his cell phone voice mail to call me back to discuss this -- particularly my concern is about Ingrezza.  I stated that he is returning to see me 8/21/2024 and he can call me sooner, or choose to wait until next appt to talk about it.

## 2024-07-22 ENCOUNTER — SOCIAL WORK (OUTPATIENT)
Dept: BEHAVIORAL/MENTAL HEALTH CLINIC | Facility: CLINIC | Age: 59
End: 2024-07-22
Payer: MEDICARE

## 2024-07-22 DIAGNOSIS — Z63.4 BEREAVEMENT: ICD-10-CM

## 2024-07-22 DIAGNOSIS — F41.1 GENERALIZED ANXIETY DISORDER: ICD-10-CM

## 2024-07-22 DIAGNOSIS — F31.76 BIPOLAR I DISORDER, MOST RECENT EPISODE DEPRESSED, IN FULL REMISSION (HCC): Primary | ICD-10-CM

## 2024-07-22 DIAGNOSIS — F41.0 PANIC ATTACKS: ICD-10-CM

## 2024-07-22 DIAGNOSIS — G47.00 INSOMNIA, UNSPECIFIED TYPE: ICD-10-CM

## 2024-07-22 LAB
CYSTATIN C SERPL-MCNC: 2.19 MG/L (ref 0.67–1.14)
GFR/BSA.PRED SERPLBLD CYS-BASED-ARV: 28 ML/MIN/1.73

## 2024-07-22 PROCEDURE — 90837 PSYTX W PT 60 MINUTES: CPT | Performed by: SOCIAL WORKER

## 2024-07-22 SDOH — SOCIAL STABILITY - SOCIAL INSECURITY: DISSAPEARANCE AND DEATH OF FAMILY MEMBER: Z63.4

## 2024-07-22 NOTE — PSYCH
"Behavioral Health Psychotherapy Progress Note    Psychotherapy Provided: Individual Psychotherapy     1. Bipolar I disorder, most recent episode depressed, in full remission (HCC)        2. Generalized anxiety disorder        3. Panic attacks        4. Insomnia, unspecified type        5. Bereavement            Goals addressed in session: Goal 1     DATA: Orlando was in a very upbeat mood today. We discussed that I sent in the paperwork to the bariatric center to help qualify him for the surgery. He spent the entire session discussing how he is looking forward to the surgery. I provided support, encouragement and strategies to cope.   During this session, this clinician used the following therapeutic modalities: Client-centered Therapy, Cognitive Behavioral Therapy, Mindfulness-based Strategies, and Supportive Psychotherapy    Substance Abuse was not addressed during this session. If the client is diagnosed with a co-occurring substance use disorder, please indicate any changes in the frequency or amount of use: n/a. Stage of change for addressing substance use diagnoses: No substance use/Not applicable    ASSESSMENT:  Tommie Taylor presents with a Euthymic/ normal mood.     his affect is Normal range and intensity, which is congruent, with his mood and the content of the session. The client has made progress on their goals.     Tommie Taylor presents with a none risk of suicide, none risk of self-harm, and none risk of harm to others.    For any risk assessment that surpasses a \"low\" rating, a safety plan must be developed.    A safety plan was indicated: no  If yes, describe in detail n/a    PLAN: Between sessions, Tommie Taylor will use mindfulness and CBT. At the next session, the therapist will use Client-centered Therapy, Cognitive Behavioral Therapy, Mindfulness-based Strategies, and Supportive Psychotherapy to address issues and symptoms as they may arise. .    Behavioral Health Treatment Plan and " Discharge Planning: Tommie GIACOMO Claudia is aware of and agrees to continue to work on their treatment plan. They have identified and are working toward their discharge goals. yes    Visit start and stop times:    07/22/24  Start Time: 1400  Stop Time: 1500  Total Visit Time: 60 minutes

## 2024-07-23 ENCOUNTER — TELEPHONE (OUTPATIENT)
Dept: NEPHROLOGY | Facility: CLINIC | Age: 59
End: 2024-07-23

## 2024-07-23 NOTE — TELEPHONE ENCOUNTER
Left message on patient's SO voicemail that CR and lytes remain elevated but stable.  Per Elida, no changes.      ----- Message from Elida Nielsen PA-C sent at 7/23/2024  4:20 PM EDT -----  Creatinine and electrolytes are stable but remain elevated.  Likely progression of disease.  No acute changes needed at this time.

## 2024-07-24 ENCOUNTER — OFFICE VISIT (OUTPATIENT)
Dept: SLEEP CENTER | Facility: CLINIC | Age: 59
End: 2024-07-24
Payer: MEDICARE

## 2024-07-24 VITALS
SYSTOLIC BLOOD PRESSURE: 130 MMHG | BODY MASS INDEX: 45 KG/M2 | RESPIRATION RATE: 18 BRPM | HEART RATE: 74 BPM | DIASTOLIC BLOOD PRESSURE: 80 MMHG | WEIGHT: 280 LBS | OXYGEN SATURATION: 98 % | HEIGHT: 66 IN

## 2024-07-24 DIAGNOSIS — E66.01 CLASS 3 SEVERE OBESITY DUE TO EXCESS CALORIES WITH SERIOUS COMORBIDITY AND BODY MASS INDEX (BMI) OF 45.0 TO 49.9 IN ADULT (HCC): ICD-10-CM

## 2024-07-24 DIAGNOSIS — G47.33 OSA (OBSTRUCTIVE SLEEP APNEA): Primary | ICD-10-CM

## 2024-07-24 DIAGNOSIS — I10 PRIMARY HYPERTENSION: ICD-10-CM

## 2024-07-24 DIAGNOSIS — F31.5 BIPOLAR I DISORDER, SEVERE, CURRENT OR MOST RECENT EPISODE DEPRESSED, WITH PSYCHOTIC FEATURES (HCC): ICD-10-CM

## 2024-07-24 DIAGNOSIS — E11.65 TYPE 2 DIABETES MELLITUS WITH HYPERGLYCEMIA, WITHOUT LONG-TERM CURRENT USE OF INSULIN (HCC): ICD-10-CM

## 2024-07-24 PROCEDURE — 99213 OFFICE O/P EST LOW 20 MIN: CPT | Performed by: STUDENT IN AN ORGANIZED HEALTH CARE EDUCATION/TRAINING PROGRAM

## 2024-07-24 PROCEDURE — G2211 COMPLEX E/M VISIT ADD ON: HCPCS | Performed by: STUDENT IN AN ORGANIZED HEALTH CARE EDUCATION/TRAINING PROGRAM

## 2024-07-24 NOTE — PATIENT INSTRUCTIONS
PAP Therapy Initiation  I will place a prescription for AutoPAP 11-17 cmH2O with a fullface mask.  You will ultimately receive your machine and regular supplies from a DME (durable medical equipment) company.   After your visit today, at the  you will discuss the DME options and select your preferred supplier. My prescription will then be sent to the DME of your choice, and they should be reaching out to you within a few weeks to schedule initial device set-up.  If not done after your visit today, please call our [place] office ([phone] option 1, then option 3) to do so.  You should be eligible for new supplies approximately every 3-6 months, depending on your insurance coverage.  If your mask doesn't fit well, call our office to schedule a formal mask fitting.  Insurance requires regular usage and periodic office follow ups for PAP therapy, to continue to cover supplies.  Insurance requires a follow-up in the office within 90 days of starting your new PAP device.  This should also be scheduled when you call one of the office numbers listed above.      CMS/Insurance Requirements    Your insurance requires a face-to-face follow up visit within a 31-90 day period after starting CPAP.  Your insurance requires compliance with CPAP, which is at least 4 hours per night for 70% of the time. This must be done over a 30 day period and must occur within the initial 31-90 day period after starting CPAP.  Your insurance also requires at least yearly follow ups to continue to pay for CPAP supplies.    PAP Supply Guidelines    Below are the guidelines for reordering your supplies. You will be responsible for your deductible, co payments, and out of pocket expenses.    Item Frequency   Nasal Mask (no headgear) 1 every 3 months   Nasal Mask Cushion 1 every 2 weeks   Full Face Mask (no headgear) 1 every 3 months   Full Face Mask Cushion 1 every month   Nasal Pillows 1 every 2 weeks   Headgear 1 every 6 months   hin  Strap 1 every 6 months   sakina 1 every 3 months   Filters: Reusable 1 every 6 months   Filters: Disposable 1 every 2 weeks   Humidifier Chamber(disposable) 1 every 6 months       About Your PAP Therapy    Continuous Positive Airway Pressure/Bilevel Therapy  You have been prescribed Positive Airway Pressure (PAP) therapy to treat sleep apnea. The most common type of sleep apnea is obstructive sleep apnea (DAISY), a condition in which the upper airway collapses during sleep. This obstruction keeps air from getting into your lungs. The prescribed PAP machine (CPAP or Bilevel or ASV) will smoothly blow air into your airway to prevent it from closing. The machine will use a mask to deliver the air through your nose and/or mouth. These devices are available in various sizes and styles.    It will take some time for you to get used to the new equipment and it may take a while for you to begin to feel the benefits of PAP therapy. Please be patient. If you are having problems adjusting to your machine, please contact us for help.    Beginning your PAP Therapy  Assembly:  Place your PAP machine on a level surface near your bed.  To prevent injury, do not place the machine higher than your head.  Keep the machine at least 12 inches away from anything that may block the vents.  Plug the machine into a properly grounded electrical outlet. It is better to avoid using an extension cord. If necessary, use a heavy-duty one.  If you are NOT using a humidifier with you PAP equipment:  Attach one end of your 6-foot tubing to the PAP unit outlet and the other end to your mask. (If your mask does not have a built-in exhalation port, a special exhalation valve should be used between the mask and the 6-foot tubing.)  Attach the headgear to your mask.  If you are using a humidifier with your PAP equipment:  Fill the humidifier with DISTILLED water to the maximum fill line.  Attach the humidifier to the PAP unit's outlet as instructed by the  "respiratory therapist with your home care company. Attach one end of your 6-foot tubing to the humidifier outlet and the other end to your mask. (If your mask does not have a built-in exhalation port, a special exhalation valve should be used between the mask and the 6-foot tubing.)  If you have been prescribed oxygen to be used with the PAP machine, you will be instructed on how to attach your oxygen tubing. Always turn off the oxygen tank before turning off your PAP machine.    Getting Started:  Wash your face and apply the mask and headgear as instructed by the sleep technologist or respiratory therapist. The mask should fit snugly to prevent air leaks, but not so tight as to cause skin irritation or discomfort.  Turn the PAP power switch to the ON position. You should feel air blowing through the mask. Breathe normally and adjust the mask gently if you feel an air leak.  If there are no leaks, activate the \"ramp\" feature on your PAP machine. The ramp allows a lower pressure to be delivered for a designated period of time (usually 5 to 45 minutes) to allow you to relax and  fall asleep more easily. The pressure will then gradually increase to the prescribed pressure that controls your apnea.  Remember to turn OFF your PAP unit when it is not in use.    Care and Maintenance    Headgear should be washed as needed. Daily inspection and weekly washings are recommended. Do not disassemble the straps. Machine wash in warm water, making sure to attach Velcro hooks and tabs before washing. Line dry or machine dry on a low setting.  Masks should be washed every day. Daily inspection is recommended. Leave the mask and tubing attached. Gently wash the mask with a soft cloth using warm water and mild detergent, concentrating on the mask cushion flaps. DO NOT use alcohol or bleach. Rinse thoroughly and air dry.  Tubing and headgear should be washed weekly. Daily inspection is recommended. Wash in warm water and mild detergent " and rinse thoroughly. Hook the tubing to the machine and blow until dry.  Humidifier should be washed daily and filled with DISTILLED water before use. Wash with warm water and mild detergent. Disinfect weekly by soaking with a solution of 1 part white vinegar and 3 parts water for 30 minutes. Rinse thoroughly and air dry.  Disposable filters should be replaced once a month. Wash reusable foam filters with warm water and mild detergent at least once a month. Rinse thoroughly and dry with paper towels.  Avoid  that contain fragrance or conditioners, as these will leave a residue.  NEVER iron any soft goods.    Troubleshooting    If the machine fails to turn on:  Make sure that the PAP machine is plugged into a grounded outlet.  Make sure that the ON/OFF switch is in the ON position.  Make sure that the wall outlet has power.    If there is no pressure coming from your machine:  Make sure that the inlet filter is clean and unblocked.  Make sure that the cooling fan is unblocked and that air is flowing freely.  Make sure that all tubing is securely connected.    If your PAP machine still fails to operate, please call your DME for assistance.    What You Should Know About Insurance    There are many different insurance policies and coverage for PAP equipment will vary. Find out what your coverage provides and what your responsibilities are as a consumer. PAP therapy equipment is considered Durable Medical Equipment (DME). Here are a few questions to ask your insurance provider:  What benefits do I have for DME? Do I have a deductible and/or co pay for DME?  Is there a repair or replacement plan for durable medical equipment?  How often can I receive a replacement mask, tubing, headgear and filters?  Do I have to demonstrate that I am using my PAP device?  o How do I do that?    Important Information    It is very important to keep your PAP equipment and supplies clean. The life of the supplies depends on the  care they receive. Under normal circumstances, expect the mask and headgear to last 9-12 months. Refer to the owner's manual for more information.    Important Safety Reminders    Keep equipment free from obstruction.  Use your PAP equipment as directed.  DO NOT try to adjust your pressure setting.  DO NOT block the exhalation port or valve.  Keep filters clean to prevent overheating.  If a humidifier is being used, place it at a level lower than your head.  If using a heated humidifier, allow unit to cool before cleaning or refilling.  Follow safety guidelines regarding oxygen equipment.DO NOT operate multiple electrical devices from one outlet.  If you have a medical emergency, contact the Emergency Medical Services.

## 2024-07-24 NOTE — PROGRESS NOTES
[facility]  Sleep Medicine Follow up/ Established Patient Visit      Assessment/Plan:  1. DAISY (obstructive sleep apnea)  Cpap DME      2. Bipolar I disorder, severe, current or most recent episode depressed, with psychotic features ([facility])  Cpap DME      3. Type 2 diabetes mellitus with hyperglycemia, without long-term current use of insulin ([facility])  Cpap DME      4. Primary hypertension  Cpap DME      5. Class 3 severe obesity due to excess calories with serious comorbidity and body mass index (BMI) of 45.0 to 49.9 in adult ([facility])  Cpap DME          Tommie is a pleasant 59-year-old gentleman with a PMHx of HTN, GERD, Schatzki's ring, T2DM with neuropathy, stage IV CKD, THERESE, bipolar 1 disorder, vitamin D deficiency, HLD, obesity, vitamin B12 deficiency who presents in follow up for DAISY (AHI 60, O2 jeanine 82% 7/2018).  He is quite adamant that he had excellent benefit from the device up until it broke ~1 month ago, and unfortunately has been unable to use it since.  However, he is very strongly motivated to resume use, and indeed, he is eligible for a new device regardless.      Discussed with patient the pathophysiology of OSAS and medical conditions associated with OSAS such as DM, HTN, CAD, Depression, Stroke, Headache.  Reviewed sleep study results at length with the patient today  Treatment options including surgery, Dental appliances, positional therapy, and CPAP were discussed.  Will start AutoPAP 11-17 cmH2O with a fullface mask.  He should not require a new sleep study as no significant changes have occurred since his original study in 2018 and he has consistently utilized his PAP device up until he was unable to do so due to circumstances outside of his control (machine broke) ~1 month ago.  Advised the patient that he should be receiving a call to schedule with their DME in ~2 weeks to receive the device.  Reviewed CMS/insurance criteria and resupply guidelines  Advised patient to  "avoid activities that could harm self or others when tired/sleepy, including driving.  Encouraged maintaining normal weight  Discussed importance of good sleep hygiene.  He will Return in about 2 months (around 9/24/2024) for Compliance Visit.      ________________________________________________________________________________________________    Per Last Visit Note (Date: 2/15/2023):  Reason for Visit:  57 y.o.male here for annual follow-up     Assessment:  Doing well on current therapy of APAP 11 to 17 cm for severe DAISY (AHI = 60.0).     Plan:  Continue same     Follow up:  One year      Sleep Studies:  -Split-night polysomnogram 7/6/2018: BMI 51.6.  Diagnostic: TST 61 minutes, sleep efficiency 44.7%.  Sleep latency 54 minutes, REM sleep latency 0 minutes.  AHI 60, supine AHI 62.4, REM AHI 0.  O2 jeanine 82%, 1.6% of time below 90%.  Treatment: PAP initiated at 4 cm H2O, titrated up to 17 cm H2O.  Best pressure noted to be 17 cm H2O.  Recommendation was for AutoPap 11-17 cm H2O.  PLM index 0.     ________________________________________________________________________________________________      Interval History: Tommie Taylor is a 59 y.o. male with a PMHx of HTN, GERD, Schatzki's ring, T2DM with neuropathy, stage IV CKD, THERESE, bipolar 1 disorder, vitamin D deficiency, HLD, obesity, vitamin B12 deficiency who presents in follow up for DAISY (AHI 60, O2 jeanine 82% 7/2018).    SDB:  -Current experience with PAP Therapy: Beneficial, has not been able to use since it broke! Symptoms have returned; he says \"I miss it.\"  -Mask type: FFM  -Difficulties with mask: Denies  -Device: [employer]; original device received 7/25/2018.  -Difficulties with device: Stopped working ~1 month ago. Has tried since, and nothing happens. Was using consistently before then.  -Compliance:              SLEEP HYGIENE QUESTIONS:  Bedtime: 2230/2300   Time it takes to fall sleep: \"Minutes\"  Wake up Time: 0830/0900   Number of times " "patient wakes up per night: 2  Reason (s) why patient wakes up during the night: Restroom   Estimated total sleep time ( in a 24 hour period of time): ~8 hours   Naps: 0    Changes to PMH, PSH, SH: None     SLEEP RELATED ROS  Review of Systems  Allergies   Allergen Reactions    Pollen Extract Nasal Congestion    Tetanus Toxoid Swelling       CURRENT MEDICATIONS:  Current Outpatient Medications   Medication Instructions    amLODIPine (NORVASC) 10 mg, Oral, Daily    ammonium lactate (LAC-HYDRIN) 12 % cream Topical, As needed    atorvastatin (LIPITOR) 20 mg, Oral, Daily    Blood Glucose Monitoring Suppl (FREESTYLE LITE) ANTONIA Does not apply, 3 times daily    Blood Glucose Monitoring Suppl (FreeStyle Lite) w/Device KIT USE AS INSTRUCTED 4 TIMES A DAY    buPROPion (WELLBUTRIN) 100 mg, Oral, 2 times daily, Take bid--(once in the AM and once at 12 noon)    busPIRone (BUSPAR) 15 mg tablet Take 1/2 - 1 tab po qd-bid    clonazePAM (KlonoPIN) 1 mg tablet TAKE 1 TABLET BY MOUTH EVERY DAY AS NEEDED FOR ANXIETY OR PANIC ATTACKS    Continuous Blood Gluc  (FreeStyle [place] 14 Day [place]) ANTONIA Use  to check BG at least 4 times a day    Continuous Blood Gluc Sensor (FreeStyle [place] 14 Day Sensor) MISC Apply sensor every 14 days to check BG at least 4 times a day    FREESTYLE LITE test strip CHECK BLOOD SUGARS FOUR TIMES DAILY    glucose monitoring kit (FREESTYLE) monitoring kit 1 each, Does not apply, 4 times daily, Fine to substitute other brand if not covered by insurance    Ingrezza 40 mg, Oral, 2 times daily    Insulin Pen Needle 31G X 8 MM MISC Check sugars three times a day    INSULIN SYRINGE .5CC/29G 29G X 1/2\" 0.5 ML MISC Use    labetalol (NORMODYNE) 100 mg, Oral, 2 times daily    Lancets (FREESTYLE) lancets USE THREE TIMES DAILY AS DIRECTED    OLANZapine (ZYPREXA) 10 mg, Oral, Daily at bedtime    omeprazole (PRILOSEC) 20 mg, Oral, Daily    polyethylene glycol (GOLYTELY) 4000 mL solution 4,000 mL, Oral, Once    " "semaglutide, 0.25 or 0.5 mg/dose, (Ozempic, 0.25 or 0.5 MG/DOSE,) 2 mg/3 mL injection pen Inject 0.375 mL (0.25 mg total) under the skin every 7 days for 30 days, THEN 0.75 mL (0.5 mg total) every 7 days.    traZODone (DESYREL) 50 mg tablet Take 1/2 to 1 tab po qhs prn insomnia    vitamin B-12 (CYANOCOBALAMIN) 500 mcg, Oral, Daily           PHYSICAL EXAMINATION:  Vital Signs: /80   Pulse 74   Resp 18   Ht 5' 6\" (1.676 m)   Wt 127 kg (280 lb)   SpO2 98%   BMI 45.19 kg/m²     Constitutional: NAD, well appearing   Mental Status: AAOx3  Skin: Warm, dry, no rashes noted   Eyes: PERRL, normal conjunctiva  ENT: Nasal congestion absent  Posterior Airspace:   Edwards Tongue Position: 4  Retrognathia: absent  Overbite: absent  High Arched Palate: present  Tongue Scalloping/Ridging: present  Uvula: normal  Chest: No evidence of respiratory distress, no accessory muscle use; no evidence of peripheral cyanosis  Abdomen: Soft, NT/ND  Extremities: No digital clubbing or pedal edema  Neuro: Strength 5/5 throughout, sensation grossly intact      I have spent a total time of 20-30 minutes on 07/24/24 in caring for this patient including Diagnostic results, Prognosis, Risks and benefits of tx options, Instructions for management, Patient and family education, Importance of tx compliance, Risk factor reductions, Documenting in the medical record, Reviewing / ordering tests, medicine, procedures  , and Obtaining or reviewing history  .        Electronically signed by:    Gurpreet Miranda DO  Board-Certified Neurology and Sleep Medicine  [facility]  07/24/24      "

## 2024-07-25 LAB
COTININE SERPL-MCNC: <1 NG/ML
NICOTINE SERPL-MCNC: <1 NG/ML

## 2024-07-26 ENCOUNTER — TELEPHONE (OUTPATIENT)
Dept: SLEEP CENTER | Facility: CLINIC | Age: 59
End: 2024-07-26

## 2024-07-26 LAB
DME PARACHUTE DELIVERY DATE REQUESTED: NORMAL
DME PARACHUTE ITEM DESCRIPTION: NORMAL
DME PARACHUTE ORDER STATUS: NORMAL
DME PARACHUTE SUPPLIER NAME: NORMAL
DME PARACHUTE SUPPLIER PHONE: NORMAL

## 2024-08-12 ENCOUNTER — SOCIAL WORK (OUTPATIENT)
Dept: BEHAVIORAL/MENTAL HEALTH CLINIC | Facility: CLINIC | Age: 59
End: 2024-08-12
Payer: MEDICARE

## 2024-08-12 DIAGNOSIS — F31.76 BIPOLAR I DISORDER, MOST RECENT EPISODE DEPRESSED, IN FULL REMISSION (HCC): Primary | ICD-10-CM

## 2024-08-12 DIAGNOSIS — F41.1 GENERALIZED ANXIETY DISORDER: ICD-10-CM

## 2024-08-12 DIAGNOSIS — G47.00 INSOMNIA, UNSPECIFIED TYPE: ICD-10-CM

## 2024-08-12 DIAGNOSIS — F41.0 PANIC ATTACKS: ICD-10-CM

## 2024-08-12 PROCEDURE — 90837 PSYTX W PT 60 MINUTES: CPT | Performed by: SOCIAL WORKER

## 2024-08-12 NOTE — PROGRESS NOTES
Behavioral Health Follow Up Note:      2 / 4  Weight Check:  Surgeon: Dr. Roge Alston  (sleeve)     Starting weight 314  #  Today's weight 272.6  #      Surgery month:  Aug/ Sept  Surgery deadline: December    Mental health and wellness - Patient is appropriately making changes based off the information contained in the bariatric manual.  Patient is modifying behaviors and demonstrating adapting to change.     Has an apt with Psych on 8/21/24 - he will ask for the completed paperwork.     Eating behaviors - only eat some crackers this morning.    Skipped dinner last night.   BENRABE cooks and it is reported to be blan.  -  Tommie will decide not to eat.     Activity -  walking around in the pool.   Every other day. 60 - 80 minutes at a time.   Going with family.    Progress toward program requirements    Workflow:  Psych and/or D+A additional documentation  PENDING  PCP Letter: 4/15/2024  Support Group: RECOMMENDED  Surgeon Appt.: 7/19/2024  EGD : HOLD:  psych   Cardiac Risk Assessment: pending  Sleep Studies: pending  Bloodwork: pending       Goals:  advocate for psych paperwork

## 2024-08-12 NOTE — PSYCH
"Behavioral Health Psychotherapy Progress Note    Psychotherapy Provided: Individual Psychotherapy     1. Bipolar I disorder, most recent episode depressed, in full remission ([facility])        2. Generalized anxiety disorder        3. Panic attacks        4. Insomnia, unspecified type            Goals addressed in session: Goal 1     DATA: Orlando feels his depression and his anxiety are for the most under control. He is awaiting the date for his weight loss surgery. He shared all is going well at home. We will be going to every 6 weeks. I provided support, encouragement and strategies to cope.   During this session, this clinician used the following therapeutic modalities: Client-centered Therapy, Cognitive Behavioral Therapy, Mindfulness-based Strategies, and Supportive Psychotherapy    Substance Abuse was not addressed during this session. If the client is diagnosed with a co-occurring substance use disorder, please indicate any changes in the frequency or amount of use: n/a. Stage of change for addressing substance use diagnoses: No substance use/Not applicable    ASSESSMENT:  Tommie Taylor presents with a Euthymic/ normal mood.     his affect is Normal range and intensity, which is congruent, with his mood and the content of the session. The client has made progress on their goals.     Tommie Taylor presents with a none risk of suicide, none risk of self-harm, and none risk of harm to others.    For any risk assessment that surpasses a \"low\" rating, a safety plan must be developed.    A safety plan was indicated: no  If yes, describe in detail n/a    PLAN: Between sessions, Tommie Taylor will use mindfulness and CBT. At the next session, the therapist will use Client-centered Therapy, Cognitive Behavioral Therapy, Mindfulness-based Strategies, and Supportive Psychotherapy to address issues and symptoms as they may arise. .    Behavioral Health Treatment Plan and Discharge Planning: Tommie Taylor is aware of " and agrees to continue to work on their treatment plan. They have identified and are working toward their discharge goals. yes    Visit start and stop times:    08/12/24  Start Time: 1300  Stop Time: 1400  Total Visit Time: 60 minutes

## 2024-08-14 LAB

## 2024-08-16 ENCOUNTER — CLINICAL SUPPORT (OUTPATIENT)
Dept: BARIATRICS | Facility: CLINIC | Age: 59
End: 2024-08-16

## 2024-08-16 VITALS — WEIGHT: 272.6 LBS | BODY MASS INDEX: 44 KG/M2

## 2024-08-16 DIAGNOSIS — E66.01 OBESITY, CLASS III, BMI 40-49.9 (MORBID OBESITY) (HCC): Primary | ICD-10-CM

## 2024-08-16 PROCEDURE — RECHECK

## 2024-08-20 DIAGNOSIS — E78.2 MIXED HYPERLIPIDEMIA: ICD-10-CM

## 2024-08-20 DIAGNOSIS — E03.8 SUBCLINICAL HYPOTHYROIDISM: ICD-10-CM

## 2024-08-20 DIAGNOSIS — E66.01 OBESITY, CLASS III, BMI 40-49.9 (MORBID OBESITY) (HCC): ICD-10-CM

## 2024-08-20 DIAGNOSIS — Z01.818 PRE-OPERATIVE CLEARANCE: Primary | ICD-10-CM

## 2024-08-20 DIAGNOSIS — G47.33 OSA (OBSTRUCTIVE SLEEP APNEA): ICD-10-CM

## 2024-08-20 DIAGNOSIS — I10 PRIMARY HYPERTENSION: ICD-10-CM

## 2024-08-20 DIAGNOSIS — E11.65 TYPE 2 DIABETES MELLITUS WITH HYPERGLYCEMIA, WITHOUT LONG-TERM CURRENT USE OF INSULIN (HCC): ICD-10-CM
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
## 2024-08-21 ENCOUNTER — OFFICE VISIT (OUTPATIENT)
Dept: PSYCHIATRY | Facility: CLINIC | Age: 59
End: 2024-08-21
Payer: MEDICARE

## 2024-08-21 VITALS — WEIGHT: 277.4 LBS | BODY MASS INDEX: 44.58 KG/M2 | HEIGHT: 66 IN

## 2024-08-21 DIAGNOSIS — F41.1 GENERALIZED ANXIETY DISORDER: Primary | ICD-10-CM

## 2024-08-21 DIAGNOSIS — G47.00 INSOMNIA, UNSPECIFIED TYPE: ICD-10-CM

## 2024-08-21 DIAGNOSIS — F31.5 BIPOLAR DISORDER, CURRENT EPISODE DEPRESSED, SEVERE, WITH PSYCHOTIC FEATURES (HCC): ICD-10-CM

## 2024-08-21 DIAGNOSIS — F41.0 PANIC ATTACKS: ICD-10-CM

## 2024-08-21 DIAGNOSIS — E55.9 VITAMIN D DEFICIENCY: ICD-10-CM

## 2024-08-21 DIAGNOSIS — R41.3 MEMORY DEFICIT: ICD-10-CM

## 2024-08-21 DIAGNOSIS — G24.01 TARDIVE DYSKINESIA: ICD-10-CM

## 2024-08-21 DIAGNOSIS — E53.8 B12 DEFICIENCY: ICD-10-CM

## 2024-08-21 PROCEDURE — 99214 OFFICE O/P EST MOD 30 MIN: CPT | Performed by: PHYSICIAN ASSISTANT

## 2024-08-21 RX ORDER — VALBENAZINE 40 MG/1
40 CAPSULE ORAL
Qty: 90 CAPSULE | Refills: 1 | Status: SHIPPED | OUTPATIENT
Start: 2024-08-21

## 2024-08-21 RX ORDER — BUPROPION HYDROCHLORIDE 100 MG/1
100 TABLET ORAL 2 TIMES DAILY
Qty: 180 TABLET | Refills: 1 | Status: SHIPPED | OUTPATIENT
Start: 2024-08-21

## 2024-08-21 RX ORDER — TRAZODONE HYDROCHLORIDE 50 MG/1
TABLET, FILM COATED ORAL
Qty: 90 TABLET | Refills: 1 | Status: SHIPPED | OUTPATIENT
Start: 2024-08-21

## 2024-08-21 RX ORDER — OLANZAPINE 10 MG/1
10 TABLET ORAL
Qty: 90 TABLET | Refills: 1 | Status: SHIPPED | OUTPATIENT
Start: 2024-08-21

## 2024-08-21 RX ORDER — BUSPIRONE HYDROCHLORIDE 15 MG/1
TABLET ORAL
Qty: 180 TABLET | Refills: 1 | Status: SHIPPED | OUTPATIENT
Start: 2024-08-21

## 2024-08-26 ENCOUNTER — SOCIAL WORK (OUTPATIENT)
Dept: BEHAVIORAL/MENTAL HEALTH CLINIC | Facility: CLINIC | Age: 59
End: 2024-08-26
Payer: MEDICARE

## 2024-08-26 DIAGNOSIS — F41.1 GENERALIZED ANXIETY DISORDER: ICD-10-CM

## 2024-08-26 DIAGNOSIS — F31.76 BIPOLAR I DISORDER, MOST RECENT EPISODE DEPRESSED, IN FULL REMISSION (HCC): Primary | ICD-10-CM

## 2024-08-26 DIAGNOSIS — G47.00 INSOMNIA, UNSPECIFIED TYPE: ICD-10-CM

## 2024-08-26 DIAGNOSIS — F41.0 PANIC ATTACKS: ICD-10-CM

## 2024-08-26 PROCEDURE — 90837 PSYTX W PT 60 MINUTES: CPT | Performed by: SOCIAL WORKER

## 2024-08-26 NOTE — PSYCH
"Behavioral Health Psychotherapy Progress Note    Psychotherapy Provided: Individual Psychotherapy     1. Bipolar I disorder, most recent episode depressed, in full remission (HCC)        2. Generalized anxiety disorder        3. Panic attacks        4. Insomnia, unspecified type            Goals addressed in session: Goal 1     DATA: Orlando shared he is qualified for his weight loss surgery. He discussed how his niece and grandkids went back to school today. He and his family are doing ok. I provided support and strategies to cope.   During this session, this clinician used the following therapeutic modalities: Client-centered Therapy, Cognitive Behavioral Therapy, Mindfulness-based Strategies, and Supportive Psychotherapy    Substance Abuse was not addressed during this session. If the client is diagnosed with a co-occurring substance use disorder, please indicate any changes in the frequency or amount of use: n/a. Stage of change for addressing substance use diagnoses: No substance use/Not applicable    ASSESSMENT:  Tommie Taylor presents with a Euthymic/ normal mood.     his affect is Normal range and intensity, which is congruent, with his mood and the content of the session. The client has made progress on their goals.     Tommie Taylor presents with a  none risk of suicide, none risk of self-harm, and none risk of harm to others.    For any risk assessment that surpasses a \"low\" rating, a safety plan must be developed.    A safety plan was indicated: no  If yes, describe in detail n/a    PLAN: Between sessions, Tommie Taylor will use mindfulness and CBT. At the next session, the therapist will use Client-centered Therapy, Cognitive Behavioral Therapy, Mindfulness-based Strategies, and Supportive Psychotherapy to address issues and symptoms as they may arise. .    Behavioral Health Treatment Plan and Discharge Planning: Tommie Taylor is aware of and agrees to continue to work on their treatment plan. " They have identified and are working toward their discharge goals. yes    Visit start and stop times:    08/26/24  Start Time: 1500  Stop Time: 1600  Total Visit Time: 60 minutes

## 2024-08-28 ENCOUNTER — TELEPHONE (OUTPATIENT)
Dept: INTERNAL MEDICINE CLINIC | Facility: CLINIC | Age: 59
End: 2024-08-28

## 2024-08-28 NOTE — TELEPHONE ENCOUNTER
Folder Color: red     Name of Form: DMP      Form to be filled out by: Dr Flores     Form to be Faxed: [phone]     Patient made aware of 10 business day policy.

## 2024-09-06 ENCOUNTER — TELEPHONE (OUTPATIENT)
Age: 59
End: 2024-09-06

## 2024-09-06 NOTE — TELEPHONE ENCOUNTER
[facility] Support Program, Jessica, called to check if fax was received that she sent on 9/3 for approval of the Ingrezza shot for patient.   Writer did not see any fax confirmation in chart.   Writer provided [facility] office fax number and attention will go to provider at that office.   Jessica will call back Monday to confirm receipt.     Jessica phone # [phone]

## 2024-09-07 NOTE — TELEPHONE ENCOUNTER
[facility] form for patient assistance with the cost of Ingrezza was faxed.  I filled out the form and signed my portion.  However, Tommie needs to put some financial information on the form.  I called him via his home phone/partner Marie's cell phone # of record and obtained as much information as I could but he did not have his financial info available at the time of my call.  I instructed for him to come in to fill out his portion when he gets his paperwork together.  He also must sign a BECKA for [employer].  I placed the form in the 's mailbox.

## 2024-09-09 ENCOUNTER — TELEPHONE (OUTPATIENT)
Dept: PSYCHIATRY | Facility: CLINIC | Age: 59
End: 2024-09-09

## 2024-09-09 NOTE — TELEPHONE ENCOUNTER
Spoke with PT in regards to the [facility] support program completed by provider. PT will need to sign prefilled BECKA (will be in office 9/11/2024) and total monthly income in section 3. Once completed fax to [phone] and scan all documents into chart.

## 2024-09-11 ENCOUNTER — SOCIAL WORK (OUTPATIENT)
Dept: BEHAVIORAL/MENTAL HEALTH CLINIC | Facility: CLINIC | Age: 59
End: 2024-09-11
Payer: MEDICARE

## 2024-09-11 DIAGNOSIS — F41.1 GENERALIZED ANXIETY DISORDER: ICD-10-CM

## 2024-09-11 DIAGNOSIS — F31.76 BIPOLAR I DISORDER, MOST RECENT EPISODE DEPRESSED, IN FULL REMISSION (HCC): Primary | ICD-10-CM

## 2024-09-11 DIAGNOSIS — G47.00 INSOMNIA, UNSPECIFIED TYPE: ICD-10-CM

## 2024-09-11 DIAGNOSIS — F41.0 PANIC ATTACKS: ICD-10-CM

## 2024-09-11 PROCEDURE — 90837 PSYTX W PT 60 MINUTES: CPT | Performed by: SOCIAL WORKER

## 2024-09-11 NOTE — BH TREATMENT PLAN
Outpatient Behavioral Health Psychotherapy Treatment Plan    Tommie Taylor  1965     Date of Initial Psychotherapy Assessment: 05/07/2018  Date of Current Treatment Plan: 09/11/24  Treatment Plan Target Date: 03/06/2025  Treatment Plan Expiration Date: 03/06/2025    Diagnosis:   1. Bipolar I disorder, most recent episode depressed, in full remission (HCC)        2. Generalized anxiety disorder        3. Panic attacks        4. Insomnia, unspecified type            Area(s) of Need: please see below    Long Term Goal 1 (in the client's own words): I continue to need help in managing my depression and my anxiety    Stage of Change: Action    Target Date for completion: 03/06/2025     Anticipated therapeutic modalities: mindfulness and CBT     People identified to complete this goal: myself with the help of my therapist.       Objective 1: (identify the means of measuring success in meeting the objective): When my symptoms arise I will be able to keep them at a minimum      Objective 2: (identify the means of measuring success in meeting the objective):       Long Term Goal 2 (in the client's own words):     Stage of Change:     Target Date for completion:      Anticipated therapeutic modalities:      People identified to complete this goal:       Objective 1: (identify the means of measuring success in meeting the objective):       Objective 2: (identify the means of measuring success in meeting the objective):      Long Term Goal 3 (in the client's own words):     Stage of Change:     Target Date for completion:      Anticipated therapeutic modalities:      People identified to complete this goal:       Objective 1: (identify the means of measuring success in meeting the objective):       Objective 2: (identify the means of measuring success in meeting the objective):      I am currently under the care of a [facility] psychiatric provider: yes    My . [facility] psychiatric provider is: ANUEL Dowd    I am  currently taking psychiatric medications: Yes, as prescribed    I feel that I will be ready for discharge from mental health care when I reach the following (measurable goal/objective): When I make the progress I am comfortable with.     For children and adults who have a legal guardian:   Has there been any change to custody orders and/or guardianship status? NA. If yes, attach updated documentation.    I have created my Crisis Plan and have been offered a copy of this plan    Behavioral Health Treatment Plan  Luke: Diagnosis and Treatment Plan explained to Tommie Taylor acknowledges an understanding of their diagnosis. Tommie Taylor agrees to this treatment plan.    I have been offered a copy of this Treatment Plan. yes

## 2024-09-11 NOTE — TELEPHONE ENCOUNTER
Pt came to [facility] to sign BECKA and finish section 3. BECKA has been scanned and papers are faxed over.

## 2024-09-11 NOTE — PSYCH
"Behavioral Health Psychotherapy Progress Note    Psychotherapy Provided: Individual Psychotherapy     1. Bipolar I disorder, most recent episode depressed, in full remission (HCC)        2. Generalized anxiety disorder        3. Panic attacks        4. Insomnia, unspecified type            Goals addressed in session: Goal 1     DATA: Orlando arrived for his session. He has an appointment on Friday to determine the date of his upcoming weight loss surgery. Things are going well at home. Orlando denies depression and his anxiety has lowered. I provided support, encouragement and strategies to cope.   During this session, this clinician used the following therapeutic modalities: Client-centered Therapy, Cognitive Behavioral Therapy, Mindfulness-based Strategies, and Supportive Psychotherapy    Substance Abuse was not addressed during this session. If the client is diagnosed with a co-occurring substance use disorder, please indicate any changes in the frequency or amount of use: n/a. Stage of change for addressing substance use diagnoses: No substance use/Not applicable    ASSESSMENT:  Tommie Taylor presents with a Euthymic/ normal mood.     his affect is Normal range and intensity, which is congruent, with his mood and the content of the session. The client has made progress on their goals.     Tommie Taylor presents with a none risk of suicide, none risk of self-harm, and none risk of harm to others.    For any risk assessment that surpasses a \"low\" rating, a safety plan must be developed.    A safety plan was indicated: no  If yes, describe in detail n/a    PLAN: Between sessions, Tommie Taylor will use mindfulness and CBT. At the next session, the therapist will use Client-centered Therapy, Cognitive Behavioral Therapy, Mindfulness-based Strategies, and Supportive Psychotherapy to address issues and symptoms as they may arise. .    Behavioral Health Treatment Plan and Discharge Planning: Tommie Taylor is " aware of and agrees to continue to work on their treatment plan. They have identified and are working toward their discharge goals. yes    Visit start and stop times:    09/11/24  Start Time: 1400  Stop Time: 1500  Total Visit Time: 60 minutes

## 2024-09-13 ENCOUNTER — CLINICAL SUPPORT (OUTPATIENT)
Dept: BARIATRICS | Facility: CLINIC | Age: 59
End: 2024-09-13

## 2024-09-13 VITALS — BODY MASS INDEX: 44.18 KG/M2 | WEIGHT: 273.7 LBS

## 2024-09-13 DIAGNOSIS — E66.01 CLASS 3 SEVERE OBESITY DUE TO EXCESS CALORIES WITH SERIOUS COMORBIDITY AND BODY MASS INDEX (BMI) OF 45.0 TO 49.9 IN ADULT (HCC): Primary | ICD-10-CM

## 2024-09-13 PROCEDURE — RECHECK: Performed by: DIETITIAN, REGISTERED

## 2024-09-13 NOTE — PROGRESS NOTES
Bariatric Nutrition Assessment Note    Type of surgery    Preop-no monthly requirement  Surgery Date: TBD-Tentative August-September 2024  Deadline December 204  Surgeon: Dr. Roge Alston  Pt unsure what procedure he wants: RNY vs VSG    Nutrition Assessment   Tommie Taylor  59 y.o.  male   4/24/2024 Initial Surgeon Consult: 314lbs  Wt with BMI of 25: 155.6  Pre-Op Excess Wt: 158.4 lbs  Wt 124 kg (273 lb 11.2 oz)   BMI 44.18 kg/m²    Minimum Qualifying BMI of 40= 247.85lbs     [facility] Equation:      ECO=3620  Weight Maintenance 2482  Estimated calories for weight loss 3156-6809 ( 1-2# per wk wt loss - sedentary )  Estimated protein needs 70-85 grams  grams (1.0-1.2 gms/kg IBW ) CKD 3b  OR estimated protein needs = 78.5 grams -105 grams/ day ( 0.6-0.8 grams/ kg actual body weight ) CKD3b  Estimated fluid needs 70 oz (30  ml/kg IBW )      NAFLD Fibrosis Score is: 3.536    NAFLD Score Correlated Fibrosis Severity   <-1.455 F0-F2   -1.455-0.676 Indeterminate Score   >0.676 F3-F4   **Fibrosis Severity Scale: F0 = no fibrosis; F1= mild fibrosis; F2 = moderate fibrosis; F3 = severe fibrosis; F4 = cirrhosis    NAFLD Score Component Values:  Component Value Date   Age: 59 y.o.     BMI: 44.18 kg/m²    IFG or DM: Yes    AST: 13 U/L 6/4/2024   ALT: 7 U/L 6/4/2024   Platelet: 180 Thousands/uL 6/4/2024   Albumin: 2.6 g/dL 6/4/2024      Weight History   Onset of Obesity: Childhood  Family history of obesity: No  Wt Loss Attempts: Exercise  Self Created Diets (Portion Control, Healthy Food Choices, etc.)  Patient has tried the above for 6 months or more with insufficient weight loss or weight regain, which is why patient has requested to be evaluated for weight loss surgery today  Maximum Wt Lost: 25 pounds with pool walking and decreasing portions   Walks 2-3 hours per day in community pool     Review of History and Medications   Past Medical History:   Diagnosis Date    ADHD, adult residual type     Anxiety      "Anxiety     Bipolar 1 disorder (HCC)     Cataract     Left eye    CPAP (continuous positive airway pressure) dependence     Depression     Depression     Diabetes mellitus (HCC)     blood sugar 167 on admission    Equinus deformity of foot     GERD (gastroesophageal reflux disease)     Homicidal ideations     Hyperlipidemia     Hypertension     Microalbuminuria     Morbid obesity (HCC)     Neuropathy     Shortness of breath     Sleep apnea     CPAP at bedtime    Sleep apnea     Stroke (HCC)     Suicidal intent      Past Surgical History:   Procedure Laterality Date    CATARACT EXTRACTION      CATARACT EXTRACTION      COLONOSCOPY      HAND SURGERY Right     OTHER SURGICAL HISTORY      REPAIR OF SUPERFICIAL WOUND FACE    CT ESOPHAGOGASTRODUODENOSCOPY TRANSORAL DIAGNOSTIC N/A 2018    Procedure: ESOPHAGOGASTRODUODENOSCOPY (EGD);  Surgeon: Yinka Maier MD;  Location: [facility];  Service: Gastroenterology    CT ESOPHAGOGASTRODUODENOSCOPY TRANSORAL DIAGNOSTIC N/A 2018    Procedure: EGD AND COLONOSCOPY;  Surgeon: Yinka Maier MD;  Location: [facility];  Service: Gastroenterology     Social History     Socioeconomic History    Marital status: /Civil Union     Spouse name: Not on file    Number of children: 1    Years of education: Not on file    Highest education level: Not on file   Occupational History    Occupation: On disability   Tobacco Use    Smoking status: Former     Current packs/day: 0.00     Types: Cigarettes     Quit date:      Years since quittin.7    Smokeless tobacco: Current     Types: Chew    Tobacco comments:     pt \"Quit after approx over 25 years ago\"   Vaping Use    Vaping status: Never Used   Substance and Sexual Activity    Alcohol use: Not Currently     Comment: rarely    Drug use: Not Currently     Comment: quit 20 years ago    Sexual activity: Yes     Partners: Female     Birth control/protection: None     Comment: steady girlfriend   Other Topics Concern    Not on file " "  Social History Narrative     from spouse, technically still  but living with other partner, partner's father, and early 9/2019, his partner's daughter and boyfriend moved in with their two children.  Then the boyfriend moved out in 9/2019.   Then partner's daughter moved out approx 7/2021.  (Pt's partner has guardianship of the grandchildren per Pt).        Children: 1 stepdaughter         Education:    Pt denies any hx of learning disabilities and reached childhood milestones on time as far as he knows.  He wore braces for equinovarus but states he did not suffer delay in walking    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 because \"I was just lazy, didn't do the work.\"    No college    Took some core classes in [employer] and worked as a volunteer  from approx [phone]             Substance Abuse History:     Pt drinks ETOH approx q 3-4 months but denies any h/o ETOH abuse.    Pt tried smoking Crack once in the past and has been smoking THC once q 2-3 months since 12y/o.     He denies other drug use, IVDA, addictions or drug abuse.         Social Determinants of Health     Financial Resource Strain: Low Risk  (4/15/2024)    Overall Financial Resource Strain (CARDIA)     Difficulty of Paying Living Expenses: Not hard at all   Food Insecurity: No Food Insecurity (5/29/2024)    Hunger Vital Sign     Worried About Running Out of Food in the Last Year: Never true     Ran Out of Food in the Last Year: Never true   Transportation Needs: No Transportation Needs (4/15/2024)    PRAPARE - Transportation     Lack of Transportation (Medical): No     Lack of Transportation (Non-Medical): No   Physical Activity: Inactive (9/14/2021)    Exercise Vital Sign     Days of Exercise per Week: 0 days     Minutes of Exercise per Session: 0 min   Stress: No Stress Concern Present (9/14/2021)    [employer] [employer] - Occupational Stress Questionnaire     Feeling of Stress : Not at " all   Social Connections: Moderately Isolated (9/14/2021)    Social Connection and Isolation Panel [NHANES]     Frequency of Communication with Friends and Family: More than three times a week     Frequency of Social Gatherings with Friends and Family: Once a week     Attends Sikhism Services: Never     Active Member of Clubs or Organizations: No     Attends Club or Organization Meetings: Never     Marital Status: Living with partner   Intimate Partner Violence: Not At Risk (9/14/2021)    Humiliation, Afraid, Rape, and Kick questionnaire     Fear of Current or Ex-Partner: No     Emotionally Abused: No     Physically Abused: No     Sexually Abused: No   Housing Stability: Low Risk  (4/15/2024)    Housing Stability Vital Sign     Unable to Pay for Housing in the Last Year: No     Number of Times Moved in the Last Year: 1     Homeless in the Last Year: No       Current Outpatient Medications:     amLODIPine (NORVASC) 5 mg tablet, Take 2 tablets (10 mg total) by mouth daily, Disp: 30 tablet, Rfl: 2    ammonium lactate (LAC-HYDRIN) 12 % cream, Apply topically as needed for dry skin, Disp: 385 g, Rfl: 0    atorvastatin (LIPITOR) 20 mg tablet, Take 1 tablet (20 mg total) by mouth daily, Disp: 90 tablet, Rfl: 3    Blood Glucose Monitoring Suppl (FREESTYLE LITE) ANTONIA, by Does not apply route 3 (three) times a day, Disp: 1 each, Rfl: 0    Blood Glucose Monitoring Suppl (FreeStyle Lite) w/Device KIT, USE AS INSTRUCTED 4 TIMES A DAY, Disp: 1 kit, Rfl: 0    buPROPion (WELLBUTRIN) 100 mg tablet, Take 1 tablet (100 mg total) by mouth 2 (two) times a day Take bid--(once in the AM and once at 12 noon), Disp: 180 tablet, Rfl: 1    busPIRone (BUSPAR) 15 mg tablet, Take 1/2 - 1 tab po qd-bid, Disp: 180 tablet, Rfl: 1    clonazePAM (KlonoPIN) 1 mg tablet, TAKE 1 TABLET BY MOUTH EVERY DAY AS NEEDED FOR ANXIETY OR PANIC ATTACKS, Disp: 30 tablet, Rfl: 5    Continuous Blood Gluc  (FreeStyle [place] 14 Day [place]) ANTONIA, Use   "to check BG at least 4 times a day, Disp: 1 each, Rfl: 0    Continuous Blood Gluc Sensor (FreeStyle Clarence 14 Day Sensor) MISC, Apply sensor every 14 days to check BG at least 4 times a day (Patient not taking: Reported on 4/24/2024), Disp: 2 each, Rfl: 5    FREESTYLE LITE test strip, CHECK BLOOD SUGARS FOUR TIMES DAILY, Disp: 400 strip, Rfl: 3    glucose monitoring kit (FREESTYLE) monitoring kit, Use 1 each 4 (four) times a day Fine to substitute other brand if not covered by insurance (Patient not taking: Reported on 7/19/2024), Disp: 1 each, Rfl: 0    Insulin Pen Needle 31G X 8 MM MISC, Check sugars three times a day (Patient not taking: Reported on 7/19/2024), Disp: 100 each, Rfl: 1    INSULIN SYRINGE .5CC/29G 29G X 1/2\" 0.5 ML MISC, Use (Patient not taking: Reported on 7/19/2024), Disp: , Rfl:     labetalol (NORMODYNE) 100 mg tablet, Take 1 tablet (100 mg total) by mouth 2 (two) times a day, Disp: 60 tablet, Rfl: 2    Lancets (FREESTYLE) lancets, USE THREE TIMES DAILY AS DIRECTED (Patient not taking: Reported on 7/19/2024), Disp: 300 each, Rfl: 0    OLANZapine (ZyPREXA) 10 mg tablet, Take 1 tablet (10 mg total) by mouth daily at bedtime, Disp: 90 tablet, Rfl: 1    omeprazole (PriLOSEC) 20 mg delayed release capsule, Take 1 capsule (20 mg total) by mouth daily, Disp: 90 capsule, Rfl: 3    polyethylene glycol (GOLYTELY) 4000 mL solution, Take 4,000 mL by mouth once for 1 dose, Disp: 4000 mL, Rfl: 0    semaglutide, 0.25 or 0.5 mg/dose, (Ozempic, 0.25 or 0.5 MG/DOSE,) 2 mg/3 mL injection pen, Inject 0.375 mL (0.25 mg total) under the skin every 7 days for 30 days, THEN 0.75 mL (0.5 mg total) every 7 days. (Patient not taking: Reported on 7/19/2024), Disp: 9 mL, Rfl: 0    traZODone (DESYREL) 50 mg tablet, Take 1/2 to 1 tab po qhs prn insomnia, Disp: 90 tablet, Rfl: 1    Valbenazine Tosylate (Ingrezza) 40 MG CAPS, Take 40 mg by mouth daily at bedtime, Disp: 90 capsule, Rfl: 1    vitamin B-12 (CYANOCOBALAMIN) 500 MCG " TABS, Take 1 tablet (500 mcg total) by mouth daily, Disp: 30 tablet, Rfl: 2    Food Intake and Lifestyle Assessment   Food Intake Assessment completed via usual diet recall  Tu Th & Sat   Breakfast: 9:15 am - dialysis for his wife  2 laxmi SHAW Zavala  - skips breakfast   Snack: 0   Lunch: 1 pm - hot dog  or hamburger or chicken or McDonalds or chips and soda pop   Fast Food several days per week for grandchildren and great niece ( regular soda )   Goes to pool and pool walking ( 3 to 4 hours )   Snack: sometimes snack - hot pretzel - shared with grandchildren and wife   Or ice pop shared antonio Roa   Dinner: cooks and preps together or her father will cook   Roast with carrots and mini potatoes   Snack: bag of pop corn   Beverage intake: water, sweetened beverages, and sweet tea sometime gatorade   Protein supplement: no  Estimated protein intake per day: 60-70 grams per day   Estimated fluid intake per day: 4-5 bottles per day or more   Meals eaten away from home: several days per week - donuts on wife dialysis day, 3 times per week FF for grandkids   Typical meal pattern: 3 meals per day and 1-2 snacks per day  Eating Behaviors: Consumption of high calorie/ high fat foods, Consumption of high calorie beverages, and Large portion sizes  Food allergies or intolerances:   Allergies   Allergen Reactions    Pollen Extract Nasal Congestion    Tetanus Toxoid Swelling   Cultural or Anabaptism considerations: N/A    Physical Assessment  Physical Activity  Types of exercise: Swimming, Water walks daily 3-4  hours   Current physical limitations: hip pain , joint pain     Psychosocial Assessment   Support systems: wife   Socioeconomic factors: disability   Lives with grandchildren, grand niece, wife and father in law     Nutrition Diagnosis-continued  Diagnosis: Overweight / Obesity (NC-3.3), Excessive energy intake (NI-1.5), Excessive fat intake (NI-5.6.2), Inappropriate intake of carbohydrates (NI-5.8.3), and  "Undesirable food choices (NB-1.7)  Related to: Food and nutrition-related knowledge deficit, Physical inactivity, and Excessive energy intake  As Evidenced by: BMI >25 and Excessive energy intake     Nutrition Prescription: Recommend the following diet  2 gram sodium /moderate protein restriction per  nephrology   Per nephrology note:  Moderate dietary protein restriction 0.8 gm/kg   low 2 g sodium diet     Interventions and Teaching   Discussed pre-op and post-op nutrition guidelines.       Patient educated and handouts provided.  Surgical changes to stomach / GI  Capacity of post-surgery stomach  Diet progression  Adequate hydration  Sugar and fat restriction to decrease \"dumping syndrome\"  Fat restriction to decrease steatorrhea  Expected weight loss  Weight loss plateaus/ possibility of weight regain  Exercise  Suggestions for pre-op diet  Nutrition considerations after surgery  Protein supplements  Meal planning and preparation  Appropriate carbohydrate, protein, and fat intake, and food/fluid choices to maximize safe weight loss, nutrient intake, and tolerance   Dietary and lifestyle changes  Possible problems with poor eating habits  Intuitive eating  Techniques for self monitoring and keeping daily food journal  Potential for food intolerance after surgery, and ways to deal with them including: lactose intolerance, nausea, reflux, vomiting, diarrhea, food intolerance, appetite changes, gas  Vitamin / Mineral supplementation of Multivitamin with minerals and Vitamin D( 5000 IU per day by nephrology )     Today pt reports that he has not yet opened his bariatric book despite being in the program since April.  When asked why pt reports he is unsure if he really wants surgery or if he wants to try to continue losing weight without surgery.  Reviewed differences between RNY and VSG using pictures in bariatric book in detail with pt.  Discussed dumping syndrome.  Dicussed with pt importance of practicing lifestyle " changes in preparation for surgery.  Instructed pt to specifically focus on chapters three through six prior to next session to help him decide if surgery is right for him.    Education provided to: patient  Barriers to learning: Desire/Motivation and some memory issues   Readiness to change: contemplation  Prior research on procedure: discussed with provider  Comprehension: needs reinforcement and verbalizes understanding   Expected Compliance: fair    Recommendations  Pt is an appropriate candidate for surgery. Yes  Pt should make the following changes and then be reassessed for weight loss surgery:   Pt needs to be nicotine free prior to  proceeding to surgery  Pt reports he quit chewing tobacco cold turkey about three weeks prior to taking his nicotine test on 7/19/2024 and has not had any since.    Evaluation / Monitoring  Dietitian to Monitor: Eating pattern as discussed Tolerance of nutrition prescription Body weight Lab values Physical activity Bowel pattern    Goals  Eliminate sugar sweetened beverages, Exercise 30 minutes 5 times per week, Complete lession plans 1-6, Eat 3 meals per day, and Eliminate mindless snacking  Previous RD Session Goals:  Continue water walking on daily basis at community pool   Recommend an one per day daily multivitamin and mineral   Continue with prescription vitamin D, once finished 5000 IU per day per nephrology   Switch to water or calories free beverages from sweetened beverages  Discontinue 2 donuts on wife's dialysis days and choose a breakfast sandwich on english muffin  2 grams sodium diet   Today:  Read Chapters 3 through 6 in bariatric book prior to next appointment.  Follow up on psych clearance paperwork.  Talk to PCP and/or endocrinologist about elevated TSH levels.    Workflow: (Incomplete in Bold):  Psych and/or D+A Clearance: pending  Blood Work:  CBC-done 6/4/24- good until 12/3/24  CMP- done 6/4/24- good until 12/3/24  HgbA1c- done 5/9/24- 6.9%.  good until  11/28/24  Lipid panel-done 4/19/24- good until 4/18/25  TSH- done 7/19/24- needs to address and recheck.  Ordered 8/20/24  PCP letter: done 4/15/24  Surgeon Appt: done 7/19/24  EGD: EGD+Colonoscopy scheduled 10/14/24 with GI  Cardiac Risk Assessment with ECG: not scheduled yet  Sleep Studies: pt reports his old CPAP broke and is no longer using and is waiting to receive his new CPAP.  Rx for CPAP sent by sleep medicine on 7/26/24  Nicotine test: first negative test done 7/19/24.  Will need second test within 30 days of surgery  Pre-Operative Program: N/A  NAFLD Score Calculated: NAFLD Fibrosis Score is: 3.536  Hepatology consult: N/A    Time Spent:   30 minutes

## 2024-09-18 ENCOUNTER — RA CDI HCC (OUTPATIENT)
Dept: OTHER | Facility: HOSPITAL | Age: 59
End: 2024-09-18

## 2024-09-18 NOTE — PROGRESS NOTES
HCC coding opportunities          Chart Reviewed number of suggestions sent to Provider: 4     Patients Insurance     Medicare Insurance: Medicare        E11.2046  E11.36  E11.42  E11.22

## 2024-09-25 ENCOUNTER — OFFICE VISIT (OUTPATIENT)
Dept: INTERNAL MEDICINE CLINIC | Facility: CLINIC | Age: 59
End: 2024-09-25

## 2024-09-25 VITALS
TEMPERATURE: 97.5 F | WEIGHT: 278 LBS | BODY MASS INDEX: 44.68 KG/M2 | HEART RATE: 103 BPM | HEIGHT: 66 IN | DIASTOLIC BLOOD PRESSURE: 95 MMHG | SYSTOLIC BLOOD PRESSURE: 177 MMHG

## 2024-09-25 DIAGNOSIS — E11.65 TYPE 2 DIABETES MELLITUS WITH HYPERGLYCEMIA, WITHOUT LONG-TERM CURRENT USE OF INSULIN (HCC): Primary | ICD-10-CM

## 2024-09-25 DIAGNOSIS — I10 HYPERTENSION, UNSPECIFIED TYPE: ICD-10-CM

## 2024-09-25 DIAGNOSIS — F41.1 GENERALIZED ANXIETY DISORDER: ICD-10-CM

## 2024-09-25 DIAGNOSIS — L85.3 XEROSIS OF SKIN: ICD-10-CM

## 2024-09-25 DIAGNOSIS — E11.65 TYPE 2 DIABETES MELLITUS WITH HYPERGLYCEMIA, UNSPECIFIED WHETHER LONG TERM INSULIN USE (HCC): ICD-10-CM

## 2024-09-25 DIAGNOSIS — K21.00 GASTROESOPHAGEAL REFLUX DISEASE WITH ESOPHAGITIS: ICD-10-CM

## 2024-09-25 DIAGNOSIS — R00.0 TACHYCARDIA: ICD-10-CM

## 2024-09-25 DIAGNOSIS — K22.2 LOWER ESOPHAGEAL RING (SCHATZKI): ICD-10-CM

## 2024-09-25 DIAGNOSIS — K44.9 HIATAL HERNIA: ICD-10-CM

## 2024-09-25 LAB — SL AMB POCT HEMOGLOBIN AIC: 8.2 (ref ?–6.5)

## 2024-09-25 PROCEDURE — 83036 HEMOGLOBIN GLYCOSYLATED A1C: CPT | Performed by: STUDENT IN AN ORGANIZED HEALTH CARE EDUCATION/TRAINING PROGRAM

## 2024-09-25 PROCEDURE — 99213 OFFICE O/P EST LOW 20 MIN: CPT | Performed by: STUDENT IN AN ORGANIZED HEALTH CARE EDUCATION/TRAINING PROGRAM

## 2024-09-25 RX ORDER — LABETALOL 100 MG/1
100 TABLET, FILM COATED ORAL 2 TIMES DAILY
Qty: 60 TABLET | Refills: 2 | Status: SHIPPED | OUTPATIENT
Start: 2024-09-25 | End: 2024-12-24

## 2024-09-25 RX ORDER — AMMONIUM LACTATE 12 G/100G
CREAM TOPICAL AS NEEDED
Qty: 385 G | Refills: 0 | Status: SHIPPED | OUTPATIENT
Start: 2024-09-25

## 2024-09-25 RX ORDER — ATORVASTATIN CALCIUM 20 MG/1
20 TABLET, FILM COATED ORAL DAILY
Qty: 90 TABLET | Refills: 3 | Status: SHIPPED | OUTPATIENT
Start: 2024-09-25

## 2024-09-25 RX ORDER — AMLODIPINE BESYLATE 5 MG/1
10 TABLET ORAL DAILY
Qty: 30 TABLET | Refills: 2 | Status: SHIPPED | OUTPATIENT
Start: 2024-09-25

## 2024-09-25 NOTE — PATIENT INSTRUCTIONS
I have refilled your medications.  Please restart taking Amlodipine 5 mg daily and Labetalol 100 mg twice daily.  Please restart taking Ozempic, 0.5 mg weekly.   Follow up in 2 weeks.

## 2024-09-25 NOTE — PROGRESS NOTES
[facility]  INTERNAL MEDICINE OFFICE VISIT     PATIENT INFORMATION     Tommie Taylor   59 y.o. male   MRN: [id number]    ASSESSMENT/PLAN     Diagnoses and all orders for this visit:    Type 2 diabetes mellitus with hyperglycemia, without long-term current use of insulin (HCC)  -     POCT hemoglobin A1c  -     IRIS Diabetic eye exam  -     semaglutide, 0.25 or 0.5 mg/dose, (Ozempic, 0.25 or 0.5 MG/DOSE,) 2 mg/3 mL injection pen; Inject 0.75 mL (0.5 mg total) under the skin every 7 days    Xerosis of skin  -     ammonium lactate (LAC-HYDRIN) 12 % cream; Apply topically as needed for dry skin    Type 2 diabetes mellitus with hyperglycemia, unspecified whether long term insulin use (HCC)  -     atorvastatin (LIPITOR) 20 mg tablet; Take 1 tablet (20 mg total) by mouth daily    Hypertension, unspecified type  -     amLODIPine (NORVASC) 5 mg tablet; Take 2 tablets (10 mg total) by mouth daily  -     labetalol (NORMODYNE) 100 mg tablet; Take 1 tablet (100 mg total) by mouth 2 (two) times a day    Hiatal hernia  -     omeprazole (PriLOSEC) 20 mg delayed release capsule; Take 1 capsule (20 mg total) by mouth daily    Lower esophageal ring (Schatzki)  -     omeprazole (PriLOSEC) 20 mg delayed release capsule; Take 1 capsule (20 mg total) by mouth daily    Gastroesophageal reflux disease with esophagitis  -     omeprazole (PriLOSEC) 20 mg delayed release capsule; Take 1 capsule (20 mg total) by mouth daily    Generalized anxiety disorder    Tachycardia  -     labetalol (NORMODYNE) 100 mg tablet; Take 1 tablet (100 mg total) by mouth 2 (two) times a day      Type 2 Diabetes  -Patient with a history of diabetes, was previously only on Ozempic but ran out per patient.  -A1C 8.2 from 6.9 today.  -Patient notes he is not currently exercising.  -Previously was on TrBernice namro.    Plan:  -At this time, plan to re-initiate patient on medication - will refill Ozempic, continue 0.5 mg  weekly.  -Follow up with endocrinology.  -Medication compliance may be a large barrier for patient - after discussing barriers to getting meds and services such as [facility], patient primarily notes he has not tried to fill his medications.  -Metformin contraindicated given stage 4 CKD.  -Follow up for A1C in 3 months. Would make sure patient received his Ozempic at follow up in 2 weeks.    Hypertension  -Patient's blood pressure 177/95 today from 137/80 in June.   -Patient notes that he is not taking any of his blood pressure medicines as he ran out.  -Previously was prescribed Labetalol 100 mg BID and Amlodipine 10 mg daily.     Plan:  -Will resume previous regimen of Labetalol 100 mg BID and Amlodipine 10 mg daily.   -Medication compliance may be a large barrier for patient - after discussing barriers to getting meds and services such as [facility], patient primarily notes he has not tried to fill his medications.  -Follow up in 2 weeks for blood pressure after re-initiation of medication.    Schedule a follow-up appointment in 3 months.    HEALTH MAINTENANCE     Immunization History   Administered Date(s) Administered    COVID-19 PFIZER VACCINE 0.3 ML IM 03/30/2021, 04/24/2021    INFLUENZA 12/11/2014, 10/09/2015, 12/21/2016, 09/28/2017    Influenza Quadrivalent, 6-35 Months IM 12/21/2016    Influenza, injectable, quadrivalent, preservative free 0.5 mL 11/06/2023    Influenza, recombinant, quadrivalent,injectable, preservative free 12/04/2018, 11/01/2019, 10/23/2020, 01/24/2022, 11/07/2022    Influenza, seasonal, injectable 12/11/2014, 10/09/2015, 09/28/2017    Pneumococcal Polysaccharide PPV23 12/04/2018     CHIEF COMPLAINT     Chief Complaint   Patient presents with    Follow-up     dm      HISTORY OF PRESENT ILLNESS     Patient is a 58 y/o male with PMH including DAISY, HTN, GERD, T2DM, bipolar who presents today for follow up of chronic conditions. Patient has no acute complaints or concerns at this  "visit. Patient notes that he is not currently taking any medications as he has been out for 6+ months. He was previously taking amlodipine and labetalol for blood pressure. Not currently exercising. Discussed further regarding his difficulties with refilling medications - patient notes primarily he has not tried to get any refills.    REVIEW OF SYSTEMS     Review of Systems   Constitutional:  Negative for chills, fatigue and fever.   HENT:  Negative for hearing loss and sore throat.    Eyes:  Negative for visual disturbance.   Respiratory:  Negative for cough and shortness of breath.    Cardiovascular:  Negative for palpitations and leg swelling.   Gastrointestinal:  Negative for abdominal distention, abdominal pain, constipation, diarrhea, nausea and vomiting.   Endocrine: Negative for polyuria.   Genitourinary:  Negative for hematuria.   Musculoskeletal:  Negative for arthralgias and back pain.   Skin:  Negative for rash and wound.   Neurological:  Negative for headaches.   Psychiatric/Behavioral:  The patient is not nervous/anxious.      OBJECTIVE     Vitals:    09/25/24 1039   BP: (!) 177/95   BP Location: Right arm   Patient Position: Sitting   Cuff Size: Large   Pulse: 103   Temp: 97.5 °F (36.4 °C)   TempSrc: Temporal   Weight: 126 kg (278 lb)   Height: 5' 6\" (1.676 m)     Physical Exam  Vitals reviewed.   Constitutional:       Appearance: Normal appearance. He is obese.   HENT:      Head: Normocephalic and atraumatic.      Right Ear: External ear normal.      Left Ear: External ear normal.      Nose: Nose normal.      Mouth/Throat:      Mouth: Mucous membranes are moist.   Eyes:      Extraocular Movements: Extraocular movements intact.      Pupils: Pupils are equal, round, and reactive to light.   Cardiovascular:      Rate and Rhythm: Normal rate and regular rhythm.      Pulses: Normal pulses.      Heart sounds: Normal heart sounds.   Pulmonary:      Effort: Pulmonary effort is normal.      Breath sounds: " Normal breath sounds.   Abdominal:      General: Abdomen is flat. Bowel sounds are normal.      Palpations: Abdomen is soft.   Musculoskeletal:      Cervical back: No rigidity or tenderness.      Left lower leg: No edema.   Skin:     General: Skin is warm.      Capillary Refill: Capillary refill takes less than 2 seconds.   Neurological:      General: No focal deficit present.      Mental Status: He is alert and oriented to person, place, and time.       CURRENT MEDICATIONS     Current Outpatient Medications:     amLODIPine (NORVASC) 5 mg tablet, Take 2 tablets (10 mg total) by mouth daily, Disp: 30 tablet, Rfl: 2    ammonium lactate (LAC-HYDRIN) 12 % cream, Apply topically as needed for dry skin, Disp: 385 g, Rfl: 0    atorvastatin (LIPITOR) 20 mg tablet, Take 1 tablet (20 mg total) by mouth daily, Disp: 90 tablet, Rfl: 3    labetalol (NORMODYNE) 100 mg tablet, Take 1 tablet (100 mg total) by mouth 2 (two) times a day, Disp: 60 tablet, Rfl: 2    omeprazole (PriLOSEC) 20 mg delayed release capsule, Take 1 capsule (20 mg total) by mouth daily, Disp: 90 capsule, Rfl: 3    semaglutide, 0.25 or 0.5 mg/dose, (Ozempic, 0.25 or 0.5 MG/DOSE,) 2 mg/3 mL injection pen, Inject 0.75 mL (0.5 mg total) under the skin every 7 days, Disp: 6 mL, Rfl: 0    Blood Glucose Monitoring Suppl (FREESTYLE LITE) ANTONIA, by Does not apply route 3 (three) times a day, Disp: 1 each, Rfl: 0    Blood Glucose Monitoring Suppl (FreeStyle Lite) w/Device KIT, USE AS INSTRUCTED 4 TIMES A DAY, Disp: 1 kit, Rfl: 0    buPROPion (WELLBUTRIN) 100 mg tablet, Take 1 tablet (100 mg total) by mouth 2 (two) times a day Take bid--(once in the AM and once at 12 noon), Disp: 180 tablet, Rfl: 1    busPIRone (BUSPAR) 15 mg tablet, Take 1/2 - 1 tab po qd-bid, Disp: 180 tablet, Rfl: 1    clonazePAM (KlonoPIN) 1 mg tablet, TAKE 1 TABLET BY MOUTH EVERY DAY AS NEEDED FOR ANXIETY OR PANIC ATTACKS, Disp: 30 tablet, Rfl: 5    Continuous Blood Gluc  (FreeStyle Clarence 14  "[place]) ANTONIA, Use  to check BG at least 4 times a day, Disp: 1 each, Rfl: 0    Continuous Blood Gluc Sensor (FreeStyle Clarence 14 Day Sensor) MISC, Apply sensor every 14 days to check BG at least 4 times a day (Patient not taking: Reported on 4/24/2024), Disp: 2 each, Rfl: 5    FREESTYLE LITE test strip, CHECK BLOOD SUGARS FOUR TIMES DAILY, Disp: 400 strip, Rfl: 3    glucose monitoring kit (FREESTYLE) monitoring kit, Use 1 each 4 (four) times a day Fine to substitute other brand if not covered by insurance (Patient not taking: Reported on 7/19/2024), Disp: 1 each, Rfl: 0    Insulin Pen Needle 31G X 8 MM MISC, Check sugars three times a day (Patient not taking: Reported on 7/19/2024), Disp: 100 each, Rfl: 1    INSULIN SYRINGE .5CC/29G 29G X 1/2\" 0.5 ML MISC, Use (Patient not taking: Reported on 7/19/2024), Disp: , Rfl:     Lancets (FREESTYLE) lancets, USE THREE TIMES DAILY AS DIRECTED (Patient not taking: Reported on 7/19/2024), Disp: 300 each, Rfl: 0    OLANZapine (ZyPREXA) 10 mg tablet, Take 1 tablet (10 mg total) by mouth daily at bedtime, Disp: 90 tablet, Rfl: 1    polyethylene glycol (GOLYTELY) 4000 mL solution, Take 4,000 mL by mouth once for 1 dose, Disp: 4000 mL, Rfl: 0    traZODone (DESYREL) 50 mg tablet, Take 1/2 to 1 tab po qhs prn insomnia, Disp: 90 tablet, Rfl: 1    Valbenazine Tosylate (Ingrezza) 40 MG CAPS, Take 40 mg by mouth daily at bedtime, Disp: 90 capsule, Rfl: 1    vitamin B-12 (CYANOCOBALAMIN) 500 MCG TABS, Take 1 tablet (500 mcg total) by mouth daily, Disp: 30 tablet, Rfl: 2    PAST MEDICAL & SURGICAL HISTORY     Past Medical History:   Diagnosis Date    ADHD, adult residual type     Anxiety     Anxiety     Bipolar 1 disorder (HCC)     Cataract     Left eye    CPAP (continuous positive airway pressure) dependence     Depression     Depression     Diabetes mellitus (HCC)     blood sugar 167 on admission    Equinus deformity of foot     GERD (gastroesophageal reflux disease)     " "Homicidal ideations     Hyperlipidemia     Hypertension     Microalbuminuria     Morbid obesity (HCC)     Neuropathy     Shortness of breath     Sleep apnea     CPAP at bedtime    Sleep apnea     Stroke (HCC)     Suicidal intent      Past Surgical History:   Procedure Laterality Date    CATARACT EXTRACTION      CATARACT EXTRACTION      COLONOSCOPY      HAND SURGERY Right     OTHER SURGICAL HISTORY      REPAIR OF SUPERFICIAL WOUND FACE    [place] ESOPHAGOGASTRODUODENOSCOPY TRANSORAL DIAGNOSTIC N/A 2018    Procedure: ESOPHAGOGASTRODUODENOSCOPY (EGD);  Surgeon: Yinka Maier MD;  Location: [facility];  Service: Gastroenterology    [place] ESOPHAGOGASTRODUODENOSCOPY TRANSORAL DIAGNOSTIC N/A 2018    Procedure: EGD AND COLONOSCOPY;  Surgeon: Yinka Maier MD;  Location: [facility];  Service: Gastroenterology     SOCIAL & FAMILY HISTORY     Social History     Socioeconomic History    Marital status: /Civil Union     Spouse name: Not on file    Number of children: 1    Years of education: Not on file    Highest education level: Not on file   Occupational History    Occupation: On disability   Tobacco Use    Smoking status: Former     Current packs/day: 0.00     Types: Cigarettes     Quit date:      Years since quittin.7    Smokeless tobacco: Current     Types: Chew    Tobacco comments:     pt \"Quit after approx over 25 years ago\"   Vaping Use    Vaping status: Never Used   Substance and Sexual Activity    Alcohol use: Not Currently     Comment: rarely    Drug use: Not Currently     Comment: quit 20 years ago    Sexual activity: Yes     Partners: Female     Birth control/protection: None     Comment: steady girlfriend   Other Topics Concern    Not on file   Social History Narrative     from spouse, technically still  but living with other partner, partner's father, and early 2019, his partner's daughter and boyfriend moved in with their two children.  Then the boyfriend moved out in 2019.  " " Then partner's daughter moved out approx 7/2021.  (Pt's partner has guardianship of the grandchildren per Pt).        Children: 1 stepdaughter         Education:    Pt denies any hx of learning disabilities and reached childhood milestones on time as far as he knows.  He wore braces for equinovarus but states he did not suffer delay in walking    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 because \"I was just lazy, didn't do the work.\"    No college    Took some core classes in [employer] and worked as a volunteer  from approx [id number]             Substance Abuse History:     Pt drinks ETOH approx q 3-4 months but denies any h/o ETOH abuse.    Pt tried smoking Crack once in the past and has been smoking THC once q 2-3 months since 12y/o.     He denies other drug use, IVDA, addictions or drug abuse.         Social Determinants of Health     Financial Resource Strain: Low Risk  (4/15/2024)    Overall Financial Resource Strain (CARDIA)     Difficulty of Paying Living Expenses: Not hard at all   Food Insecurity: No Food Insecurity (5/29/2024)    Hunger Vital Sign     Worried About Running Out of Food in the Last Year: Never true     Ran Out of Food in the Last Year: Never true   Transportation Needs: No Transportation Needs (4/15/2024)    PRAPARE - Transportation     Lack of Transportation (Medical): No     Lack of Transportation (Non-Medical): No   Physical Activity: Inactive (9/14/2021)    Exercise Vital Sign     Days of Exercise per Week: 0 days     Minutes of Exercise per Session: 0 min   Stress: No Stress Concern Present (9/14/2021)    [employer] [place] [employer] - Occupational Stress Questionnaire     Feeling of Stress : Not at all   Social Connections: Moderately Isolated (9/14/2021)    Social Connection and Isolation Panel [NHANES]     Frequency of Communication with Friends and Family: More than three times a week     Frequency of Social Gatherings with Friends and Family: Once " "a week     Attends Synagogue Services: Never     Active Member of Clubs or Organizations: No     Attends Club or Organization Meetings: Never     Marital Status: Living with partner   Intimate Partner Violence: Not At Risk (2021)    Humiliation, Afraid, Rape, and Kick questionnaire     Fear of Current or Ex-Partner: No     Emotionally Abused: No     Physically Abused: No     Sexually Abused: No   Housing Stability: Low Risk  (4/15/2024)    Housing Stability Vital Sign     Unable to Pay for Housing in the Last Year: No     Number of Times Moved in the Last Year: 1     Homeless in the Last Year: No     Social History     Substance and Sexual Activity   Alcohol Use Not Currently    Comment: rarely       Social History     Substance and Sexual Activity   Drug Use Not Currently    Comment: quit 20 years ago     Social History     Tobacco Use   Smoking Status Former    Current packs/day: 0.00    Types: Cigarettes    Quit date:     Years since quittin.7   Smokeless Tobacco Current    Types: Chew   Tobacco Comments    pt \"Quit after approx over 25 years ago\"     Family History   Problem Relation Age of Onset    Diabetes Mother     Alcohol abuse Father     Diabetes Father     Hypertension Father     Lung cancer Father     Stroke Father     Cancer Father         lung    Alcohol abuse Sister     Depression Sister     Lymphoma Sister     Alcohol abuse Family     Alcohol abuse Sister     Alcohol abuse Sister     Cancer Sister     Heart disease Neg Hx     Thyroid disease Neg Hx              ==  Layton Flores MD PGY3  [facility]    [address], [address]  [place] [address]  Office: [phone]  Fax: [phone]   "

## 2024-09-26 LAB
LEFT EYE DIABETIC RETINOPATHY: ABNORMAL
LEFT EYE IMAGE QUALITY: ABNORMAL
LEFT EYE MACULAR EDEMA: ABNORMAL
LEFT EYE OTHER RETINOPATHY: ABNORMAL
RIGHT EYE DIABETIC RETINOPATHY: ABNORMAL
RIGHT EYE IMAGE QUALITY: ABNORMAL
RIGHT EYE MACULAR EDEMA: ABNORMAL
RIGHT EYE OTHER RETINOPATHY: ABNORMAL
SEVERITY (EYE EXAM): ABNORMAL

## 2024-09-30 ENCOUNTER — TELEPHONE (OUTPATIENT)
Age: 59
End: 2024-09-30

## 2024-09-30 NOTE — TELEPHONE ENCOUNTER
Patient is calling regarding cancelling an appointment.    Date/Time: 10/2/2024 at 2 pm    Reason: unable to make it    Patient was rescheduled: YES [x] NO []  If yes, when was Patient reschedule for: 10/31/2024 at 5 pm    Patient requesting call back to reschedule: YES [] NO [x]

## 2024-10-07 ENCOUNTER — APPOINTMENT (OUTPATIENT)
Dept: LAB | Facility: CLINIC | Age: 59
End: 2024-10-07
Payer: MEDICARE

## 2024-10-07 ENCOUNTER — TELEPHONE (OUTPATIENT)
Age: 59
End: 2024-10-07

## 2024-10-07 DIAGNOSIS — E11.65 TYPE 2 DIABETES MELLITUS WITH HYPERGLYCEMIA, WITHOUT LONG-TERM CURRENT USE OF INSULIN (HCC): ICD-10-CM

## 2024-10-07 DIAGNOSIS — E78.2 MIXED HYPERLIPIDEMIA: ICD-10-CM

## 2024-10-07 DIAGNOSIS — I10 PRIMARY HYPERTENSION: ICD-10-CM

## 2024-10-07 DIAGNOSIS — G47.33 OSA (OBSTRUCTIVE SLEEP APNEA): ICD-10-CM

## 2024-10-07 DIAGNOSIS — N18.4 STAGE 4 CHRONIC KIDNEY DISEASE (HCC): ICD-10-CM

## 2024-10-07 DIAGNOSIS — Z01.818 PRE-OPERATIVE CLEARANCE: ICD-10-CM

## 2024-10-07 DIAGNOSIS — E66.01 OBESITY, CLASS III, BMI 40-49.9 (MORBID OBESITY) (HCC): ICD-10-CM

## 2024-10-07 DIAGNOSIS — E03.8 SUBCLINICAL HYPOTHYROIDISM: ICD-10-CM

## 2024-10-07 LAB
25(OH)D3 SERPL-MCNC: <7 NG/ML (ref 30–100)
ANION GAP SERPL CALCULATED.3IONS-SCNC: 3 MMOL/L (ref 4–13)
BUN SERPL-MCNC: 23 MG/DL (ref 5–25)
CALCIUM SERPL-MCNC: 8.4 MG/DL (ref 8.4–10.2)
CHLORIDE SERPL-SCNC: 105 MMOL/L (ref 96–108)
CO2 SERPL-SCNC: 25 MMOL/L (ref 21–32)
CREAT SERPL-MCNC: 3.07 MG/DL (ref 0.6–1.3)
CREAT UR-MCNC: 52.8 MG/DL
ERYTHROCYTE [DISTWIDTH] IN BLOOD BY AUTOMATED COUNT: 14.4 % (ref 11.6–15.1)
GFR SERPL CREATININE-BSD FRML MDRD: 21 ML/MIN/1.73SQ M
GLUCOSE P FAST SERPL-MCNC: 247 MG/DL (ref 65–99)
HCT VFR BLD AUTO: 34.9 % (ref 36.5–49.3)
HGB BLD-MCNC: 11.4 G/DL (ref 12–17)
MAGNESIUM SERPL-MCNC: 1.6 MG/DL (ref 1.9–2.7)
MCH RBC QN AUTO: 25.9 PG (ref 26.8–34.3)
MCHC RBC AUTO-ENTMCNC: 32.7 G/DL (ref 31.4–37.4)
MCV RBC AUTO: 79 FL (ref 82–98)
PHOSPHATE SERPL-MCNC: 3.2 MG/DL (ref 2.7–4.5)
PLATELET # BLD AUTO: 201 THOUSANDS/UL (ref 149–390)
PMV BLD AUTO: 10.3 FL (ref 8.9–12.7)
POTASSIUM SERPL-SCNC: 4.9 MMOL/L (ref 3.5–5.3)
PROT UR-MCNC: 441.5 MG/DL
PROT/CREAT UR: 8.4 MG/G{CREAT} (ref 0–0.1)
PTH-INTACT SERPL-MCNC: 187.1 PG/ML (ref 12–88)
RBC # BLD AUTO: 4.41 MILLION/UL (ref 3.88–5.62)
SODIUM SERPL-SCNC: 133 MMOL/L (ref 135–147)
T4 FREE SERPL-MCNC: 0.68 NG/DL (ref 0.61–1.12)
TSH SERPL DL<=0.05 MIU/L-ACNC: 4.85 UIU/ML (ref 0.45–4.5)
WBC # BLD AUTO: 6.75 THOUSAND/UL (ref 4.31–10.16)

## 2024-10-07 PROCEDURE — 84156 ASSAY OF PROTEIN URINE: CPT

## 2024-10-07 PROCEDURE — 83735 ASSAY OF MAGNESIUM: CPT

## 2024-10-07 PROCEDURE — 84443 ASSAY THYROID STIM HORMONE: CPT

## 2024-10-07 PROCEDURE — 82570 ASSAY OF URINE CREATININE: CPT

## 2024-10-07 PROCEDURE — 85027 COMPLETE CBC AUTOMATED: CPT

## 2024-10-07 PROCEDURE — 82306 VITAMIN D 25 HYDROXY: CPT

## 2024-10-07 PROCEDURE — 84100 ASSAY OF PHOSPHORUS: CPT

## 2024-10-07 PROCEDURE — 36415 COLL VENOUS BLD VENIPUNCTURE: CPT

## 2024-10-07 PROCEDURE — 82610 CYSTATIN C: CPT

## 2024-10-07 PROCEDURE — 84439 ASSAY OF FREE THYROXINE: CPT

## 2024-10-07 PROCEDURE — 80048 BASIC METABOLIC PNL TOTAL CA: CPT

## 2024-10-07 PROCEDURE — 83970 ASSAY OF PARATHORMONE: CPT

## 2024-10-07 NOTE — TELEPHONE ENCOUNTER
Patient called in regard to appt on 10/9.   Writer checked chart and no appt for this day. Writer confirmed patient's upcoming MM and TT appts  10/18/24 at 2:30pm  10/31/24 at 5pm

## 2024-10-08 ENCOUNTER — OFFICE VISIT (OUTPATIENT)
Dept: NEPHROLOGY | Facility: CLINIC | Age: 59
End: 2024-10-08
Payer: MEDICARE

## 2024-10-08 VITALS
WEIGHT: 287 LBS | DIASTOLIC BLOOD PRESSURE: 90 MMHG | BODY MASS INDEX: 46.12 KG/M2 | HEART RATE: 98 BPM | HEIGHT: 66 IN | SYSTOLIC BLOOD PRESSURE: 158 MMHG

## 2024-10-08 DIAGNOSIS — N18.4 CHRONIC KIDNEY DISEASE-MINERAL BONE DISORDER (CKD-MBD) WITH STAGE 4 CHRONIC KIDNEY DISEASE (HCC): ICD-10-CM

## 2024-10-08 DIAGNOSIS — R80.1 PERSISTENT PROTEINURIA: ICD-10-CM

## 2024-10-08 DIAGNOSIS — I12.9 TYPE 2 DIABETES MELLITUS WITH STAGE 4 CHRONIC KIDNEY DISEASE AND HYPERTENSION (HCC): Primary | ICD-10-CM

## 2024-10-08 DIAGNOSIS — E11.65 TYPE 2 DIABETES MELLITUS WITH HYPERGLYCEMIA, UNSPECIFIED WHETHER LONG TERM INSULIN USE (HCC): ICD-10-CM

## 2024-10-08 DIAGNOSIS — I10 HYPERTENSION, UNSPECIFIED TYPE: ICD-10-CM

## 2024-10-08 DIAGNOSIS — R00.0 TACHYCARDIA: ICD-10-CM

## 2024-10-08 DIAGNOSIS — I10 PRIMARY HYPERTENSION: ICD-10-CM

## 2024-10-08 DIAGNOSIS — M89.9 CHRONIC KIDNEY DISEASE-MINERAL BONE DISORDER (CKD-MBD) WITH STAGE 4 CHRONIC KIDNEY DISEASE (HCC): ICD-10-CM

## 2024-10-08 DIAGNOSIS — E66.813 CLASS 3 SEVERE OBESITY DUE TO EXCESS CALORIES WITH SERIOUS COMORBIDITY AND BODY MASS INDEX (BMI) OF 45.0 TO 49.9 IN ADULT (HCC): ICD-10-CM

## 2024-10-08 DIAGNOSIS — E83.9 CHRONIC KIDNEY DISEASE-MINERAL BONE DISORDER (CKD-MBD) WITH STAGE 4 CHRONIC KIDNEY DISEASE (HCC): ICD-10-CM

## 2024-10-08 DIAGNOSIS — N18.4 TYPE 2 DIABETES MELLITUS WITH STAGE 4 CHRONIC KIDNEY DISEASE AND HYPERTENSION (HCC): Primary | ICD-10-CM

## 2024-10-08 DIAGNOSIS — R79.89 ELEVATED SERUM CREATININE: ICD-10-CM

## 2024-10-08 DIAGNOSIS — E66.01 CLASS 3 SEVERE OBESITY DUE TO EXCESS CALORIES WITH SERIOUS COMORBIDITY AND BODY MASS INDEX (BMI) OF 45.0 TO 49.9 IN ADULT (HCC): ICD-10-CM

## 2024-10-08 DIAGNOSIS — E11.22 TYPE 2 DIABETES MELLITUS WITH STAGE 4 CHRONIC KIDNEY DISEASE AND HYPERTENSION (HCC): Primary | ICD-10-CM

## 2024-10-08 DIAGNOSIS — E55.9 VITAMIN D DEFICIENCY: ICD-10-CM

## 2024-10-08 PROCEDURE — G2211 COMPLEX E/M VISIT ADD ON: HCPCS | Performed by: INTERNAL MEDICINE

## 2024-10-08 PROCEDURE — 99214 OFFICE O/P EST MOD 30 MIN: CPT | Performed by: INTERNAL MEDICINE

## 2024-10-08 RX ORDER — AMLODIPINE BESYLATE 5 MG/1
10 TABLET ORAL DAILY
Qty: 90 TABLET | Refills: 2 | Status: ON HOLD | OUTPATIENT
Start: 2024-10-08

## 2024-10-08 RX ORDER — LABETALOL 100 MG/1
100 TABLET, FILM COATED ORAL 2 TIMES DAILY
Qty: 90 TABLET | Refills: 2 | Status: ON HOLD | OUTPATIENT
Start: 2024-10-08 | End: 2025-01-06

## 2024-10-08 RX ORDER — ATORVASTATIN CALCIUM 20 MG/1
20 TABLET, FILM COATED ORAL DAILY
Qty: 90 TABLET | Refills: 3 | Status: ON HOLD | OUTPATIENT
Start: 2024-10-08

## 2024-10-08 RX ORDER — ERGOCALCIFEROL 1.25 MG/1
50000 CAPSULE ORAL WEEKLY
Qty: 12 CAPSULE | Refills: 0 | Status: ON HOLD | OUTPATIENT
Start: 2024-10-08

## 2024-10-08 NOTE — PATIENT INSTRUCTIONS
1.)  Low 2 g sodium diet    2.)  Monitor weights at home    3.)  Avoid NSAIDs (ibuprofen, Motrin, Advil, Aleve, naproxen)    4.)  Monitor blood pressure at home, call if blood pressure greater than 150/90 persistently    5.) I will plan to discuss all results including blood work, and/or imaging at our next visit, unless there is an urgent indication, in which case I will call you earlier. If you have any questions or concerns about your results, please feel free to call our office.    6.) Start Vit D Ergocalciferol 50,000 units once weekly     7.) Refilled Norvasc and Lipitor    8.) Call Endocrinology for refills on Insulin    9.) Attend ACP & Kidney Education class

## 2024-10-08 NOTE — PROGRESS NOTES
Ambulatory Visit  Name: Tommie Taylor      : 1965      MRN: [id number]  Encounter Provider: Steven No MD  Encounter Date: 10/8/2024   Encounter department: [facility] [place]    Assessment & Plan  Type 2 diabetes mellitus with stage 4 chronic kidney disease and hypertension (HCC)    Lab Results   Component Value Date    HGBA1C 8.2 (A) 2024   -- Has had underlying progression of his underlying chronic kidney disease  -- Creatinine up to 3.09 mg/dL his last creatinine was 2.9 mg/dL  -- Baseline creatinine now appears to fluctuate high 2's, possibly low 3's  -- Nephrotic range proteinuria, likely secondary to diabetic nephropathy, not on RAAS blockade due to progression of underlying kidney disease  -- Better control of his diabetes and hypertension  -- His preferred modality of choice is in center hemodialysis.  -- Will refer him to CKD education class for modality review.  Also refer to nephrology advanced care meeting  -- Avoid NSAIDs.    Diabetes mellitus type 2  -- Poorly controlled  -- Yearly ophthalmologic and podiatry checks  -- Following with endocrinology  -- Currently on insulin  -- Hemoglobin A1c 8.2  -- Needs better diabetic control  -- Nephrotic range proteinuria suspected diabetic nephropathy    Hypertension  -- BMI 46.3  -- Has not taken his blood pressure medications in a few days as he ran out  -- I have refilled his amlodipine 10 mg daily  -- I have refilled his labetalol 100 mg twice a day  -- Low 2 g sodium diet  -- If evidence of volume overload we will plan to start torsemide    Orders:    ergocalciferol (ERGOCALCIFEROL) 1.25 MG (44346 UT) capsule; Take 1 capsule (50,000 Units total) by mouth once a week    Ambulatory Referral to CKD Education Program; Future    Ambulatory Referral to Advanced Care Planning; Future    Primary hypertension  -- Not at target  --Morbidly obese with underlying obstructive sleep apnea  --BMI 46.3  --Ran out of his blood  pressure medications.  I have refilled his labetalol and amlodipine  Orders:    ergocalciferol (ERGOCALCIFEROL) 1.25 MG (06321 UT) capsule; Take 1 capsule (50,000 Units total) by mouth once a week    Ambulatory Referral to CKD Education Program; Future    Ambulatory Referral to Advanced Care Planning; Future    Class 3 severe obesity due to excess calories with serious comorbidity and body mass index (BMI) of 45.0 to 49.9 in adult ([facility])  -- BMI 46.3  Orders:    ergocalciferol (ERGOCALCIFEROL) 1.25 MG (36069 UT) capsule; Take 1 capsule (50,000 Units total) by mouth once a week    Ambulatory Referral to CKD Education Program; Future    Ambulatory Referral to Advanced Care Planning; Future    Elevated serum creatinine  -- Suspected underlying progression of disease due to nephrotic range proteinuria with poorly controlled diabetes and hypertension  Orders:    ergocalciferol (ERGOCALCIFEROL) 1.25 MG (51889 UT) capsule; Take 1 capsule (50,000 Units total) by mouth once a week    Ambulatory Referral to CKD Education Program; Future    Ambulatory Referral to Advanced Care Planning; Future    Persistent proteinuria  -- Not on RAAS blockade due to progression of underlying CKD  Orders:    ergocalciferol (ERGOCALCIFEROL) 1.25 MG (90366 UT) capsule; Take 1 capsule (50,000 Units total) by mouth once a week    Ambulatory Referral to CKD Education Program; Future    Ambulatory Referral to Advanced Care Planning; Future    Vitamin D deficiency  --Vitamin D 25-hydroxy level less than 7  --Start ergocalciferol 50,000 units weekly x 12 weeks  Orders:    ergocalciferol (ERGOCALCIFEROL) 1.25 MG (41445 UT) capsule; Take 1 capsule (50,000 Units total) by mouth once a week    Ambulatory Referral to CKD Education Program; Future    Ambulatory Referral to Advanced Care Planning; Future    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 (HCC)  Lab Results   Component Value Date    EGFR 21 10/07/2024     EGFR 22 07/19/2024    EGFR 28 (L) 07/19/2024    CREATININE 3.07 (H) 10/07/2024    CREATININE 2.89 (H) 07/19/2024    CREATININE 2.80 (H) 06/04/2024   -- Phosphorus is normal  -- Calcium is normal  -- Magnesium slightly low at 1.6  -- Vitamin D 25-hydroxy level less than 7  -- Worsening PTH at 187.1  -- Replenish vitamin D stores by starting ergocalciferol 50,000 units weekly    Orders:    ergocalciferol (ERGOCALCIFEROL) 1.25 MG (14223 UT) capsule; Take 1 capsule (50,000 Units total) by mouth once a week    Ambulatory Referral to CKD Education Program; Future    Ambulatory Referral to Advanced Care Planning; Future    Type 2 diabetes mellitus with hyperglycemia, unspecified whether long term insulin use (HCC)  --  Lab Results   Component Value Date    HGBA1C 8.2 (A) 09/25/2024   --Management as per endocrinology  -- Poorly controlled  -- Sodium 133 when corrected for the hyperglycemia close to normal.    Orders:    ergocalciferol (ERGOCALCIFEROL) 1.25 MG (73402 UT) capsule; Take 1 capsule (50,000 Units total) by mouth once a week    Ambulatory Referral to CKD Education Program; Future    Ambulatory Referral to Advanced Care Planning; Future    atorvastatin (LIPITOR) 20 mg tablet; Take 1 tablet (20 mg total) by mouth daily    Hypertension, unspecified type  --see above  Orders:    ergocalciferol (ERGOCALCIFEROL) 1.25 MG (19313 UT) capsule; Take 1 capsule (50,000 Units total) by mouth once a week    Ambulatory Referral to CKD Education Program; Future    Ambulatory Referral to Advanced Care Planning; Future    amLODIPine (NORVASC) 5 mg tablet; Take 2 tablets (10 mg total) by mouth daily    labetalol (NORMODYNE) 100 mg tablet; Take 1 tablet (100 mg total) by mouth 2 (two) times a day      History of Present Illness     Tommie Taylor is a 59 y.o. male who presents presents for follow-up of chronic kidney disease stage IV with underlying progression of disease.  Since her last visit no ER visits or hospitalizations.   His margarita who is a dialysis patient was on the speaker phone present during the encounter.  All questions were answered.  He had blood work done yesterday which included CMP phosphorus PTH vitamin D CBC.  Hemoglobin stable at 11.9.  PTH worsened to 187.1.  Phosphorus and calcium are normal.  Vitamin D is severely low at less than 7.  His creatinine is at 3.0 mg/dL slight upward trend in gradual progression of underlying disease in the setting of poorly controlled diabetes and hypertension.    History obtained from : patient and patient's Significant Other  Review of Systems   Constitutional:  Negative for activity change and fever.   Respiratory:  Negative for cough, chest tightness, shortness of breath and wheezing.    Cardiovascular:  Negative for chest pain and leg swelling.   Gastrointestinal:  Negative for abdominal pain, diarrhea, nausea and vomiting.   Endocrine: Negative for polyuria.   Genitourinary:  Negative for difficulty urinating, dysuria, flank pain, frequency and urgency.   Skin:  Negative for rash.   Neurological:  Negative for dizziness, syncope, light-headedness and headaches.     Current Outpatient Medications on File Prior to Visit   Medication Sig Dispense Refill    ammonium lactate (LAC-HYDRIN) 12 % cream Apply topically as needed for dry skin 385 g 0    Blood Glucose Monitoring Suppl (FREESTYLE LITE) ANTONIA by Does not apply route 3 (three) times a day 1 each 0    Blood Glucose Monitoring Suppl (FreeStyle Lite) w/Device KIT USE AS INSTRUCTED 4 TIMES A DAY 1 kit 0    buPROPion (WELLBUTRIN) 100 mg tablet Take 1 tablet (100 mg total) by mouth 2 (two) times a day Take bid--(once in the AM and once at 12 noon) 180 tablet 1    busPIRone (BUSPAR) 15 mg tablet Take 1/2 - 1 tab po qd-bid 180 tablet 1    clonazePAM (KlonoPIN) 1 mg tablet TAKE 1 TABLET BY MOUTH EVERY DAY AS NEEDED FOR ANXIETY OR PANIC ATTACKS 30 tablet 5    Continuous Blood Gluc  ([place] [employer] [place] 14 Day [place]) ANTONIA Use   "to check BG at least 4 times a day 1 each 0    FREESTYLE LITE test strip CHECK BLOOD SUGARS FOUR TIMES DAILY 400 strip 3    OLANZapine (ZyPREXA) 10 mg tablet Take 1 tablet (10 mg total) by mouth daily at bedtime 90 tablet 1    omeprazole (PriLOSEC) 20 mg delayed release capsule Take 1 capsule (20 mg total) by mouth daily 90 capsule 3    semaglutide, 0.25 or 0.5 mg/dose, (Ozempic, 0.25 or 0.5 MG/DOSE,) 2 mg/3 mL injection pen Inject 0.75 mL (0.5 mg total) under the skin every 7 days 6 mL 0    traZODone (DESYREL) 50 mg tablet Take 1/2 to 1 tab po qhs prn insomnia 90 tablet 1    Valbenazine Tosylate (Ingrezza) 40 MG CAPS Take 40 mg by mouth daily at bedtime 90 capsule 1    vitamin B-12 (CYANOCOBALAMIN) 500 MCG TABS Take 1 tablet (500 mcg total) by mouth daily 30 tablet 2    [DISCONTINUED] amLODIPine (NORVASC) 5 mg tablet Take 2 tablets (10 mg total) by mouth daily 30 tablet 2    [DISCONTINUED] atorvastatin (LIPITOR) 20 mg tablet Take 1 tablet (20 mg total) by mouth daily 90 tablet 3    [DISCONTINUED] labetalol (NORMODYNE) 100 mg tablet Take 1 tablet (100 mg total) by mouth 2 (two) times a day 60 tablet 2    Continuous Blood Gluc Sensor (FreeStyle Clarence 14 Day Sensor) MISC Apply sensor every 14 days to check BG at least 4 times a day (Patient not taking: Reported on 4/24/2024) 2 each 5    glucose monitoring kit (FREESTYLE) monitoring kit Use 1 each 4 (four) times a day Fine to substitute other brand if not covered by insurance (Patient not taking: Reported on 7/19/2024) 1 each 0    Insulin Pen Needle 31G X 8 MM MISC Check sugars three times a day (Patient not taking: Reported on 7/19/2024) 100 each 1    INSULIN SYRINGE .5CC/29G 29G X 1/2\" 0.5 ML MISC Use (Patient not taking: Reported on 7/19/2024)      Lancets (FREESTYLE) lancets USE THREE TIMES DAILY AS DIRECTED (Patient not taking: Reported on 7/19/2024) 300 each 0    polyethylene glycol (GOLYTELY) 4000 mL solution Take 4,000 mL by mouth once for 1 dose 4000 mL 0 " "    No current facility-administered medications on file prior to visit.          Objective     /90   Pulse 98   Ht 5' 6\" (1.676 m)   Wt 130 kg (287 lb)   BMI 46.32 kg/m²     Physical Exam  Vitals and nursing note reviewed.   Constitutional:       General: He is not in acute distress.     Appearance: He is well-developed. He is obese. He is not diaphoretic.   HENT:      Head: Normocephalic and atraumatic.   Eyes:      General: No scleral icterus.     Pupils: Pupils are equal, round, and reactive to light.   Cardiovascular:      Rate and Rhythm: Normal rate and regular rhythm.      Heart sounds: Normal heart sounds. No murmur heard.     No friction rub. No gallop.   Pulmonary:      Effort: Pulmonary effort is normal. No respiratory distress.      Breath sounds: Normal breath sounds. No wheezing or rales.   Chest:      Chest wall: No tenderness.   Abdominal:      General: Bowel sounds are normal. There is no distension.      Palpations: Abdomen is soft.      Tenderness: There is no abdominal tenderness. There is no rebound.   Musculoskeletal:         General: Normal range of motion.      Cervical back: Normal range of motion and neck supple.   Skin:     Findings: No rash.   Neurological:      Mental Status: He is alert and oriented to person, place, and time.       Administrative Statements   I have spent a total time of 35 minutes in caring for this patient on the day of the visit/encounter including Instructions for management, Patient and family education, Importance of tx compliance, Risk factor reductions, Impressions, Counseling / Coordination of care, Documenting in the medical record, Reviewing / ordering tests, medicine, procedures  , and Obtaining or reviewing history  .  "

## 2024-10-08 NOTE — ASSESSMENT & PLAN NOTE
-- BMI 46.3  Orders:    ergocalciferol (ERGOCALCIFEROL) 1.25 MG (66621 UT) capsule; Take 1 capsule (50,000 Units total) by mouth once a week    Ambulatory Referral to CKD Education Program; Future    Ambulatory Referral to Advanced Care Planning; Future

## 2024-10-08 NOTE — ASSESSMENT & PLAN NOTE
-- Not on RAAS blockade due to progression of underlying CKD  Orders:    ergocalciferol (ERGOCALCIFEROL) 1.25 MG (49699 UT) capsule; Take 1 capsule (50,000 Units total) by mouth once a week    Ambulatory Referral to CKD Education Program; Future    Ambulatory Referral to Advanced Care Planning; Future

## 2024-10-08 NOTE — ASSESSMENT & PLAN NOTE
-- Not at target  --Morbidly obese with underlying obstructive sleep apnea  --BMI 46.3  --Ran out of his blood pressure medications.  I have refilled his labetalol and amlodipine  Orders:    ergocalciferol (ERGOCALCIFEROL) 1.25 MG (46390 UT) capsule; Take 1 capsule (50,000 Units total) by mouth once a week    Ambulatory Referral to CKD Education Program; Future    Ambulatory Referral to Advanced Care Planning; Future

## 2024-10-08 NOTE — ASSESSMENT & PLAN NOTE
--Vitamin D 25-hydroxy level less than 7  --Start ergocalciferol 50,000 units weekly x 12 weeks  Orders:    ergocalciferol (ERGOCALCIFEROL) 1.25 MG (72829 UT) capsule; Take 1 capsule (50,000 Units total) by mouth once a week    Ambulatory Referral to CKD Education Program; Future    Ambulatory Referral to Advanced Care Planning; Future

## 2024-10-08 NOTE — ASSESSMENT & PLAN NOTE
Lab Results   Component Value Date    HGBA1C 8.2 (A) 09/25/2024   -- Has had underlying progression of his underlying chronic kidney disease  -- Creatinine up to 3.09 mg/dL his last creatinine was 2.9 mg/dL  -- Baseline creatinine now appears to fluctuate high 2's, possibly low 3's  -- Nephrotic range proteinuria, likely secondary to diabetic nephropathy, not on RAAS blockade due to progression of underlying kidney disease  -- Better control of his diabetes and hypertension  -- His preferred modality of choice is in center hemodialysis.  -- Will refer him to CKD education class for modality review.  Also refer to nephrology advanced care meeting  -- Avoid NSAIDs.    Diabetes mellitus type 2  -- Poorly controlled  -- Yearly ophthalmologic and podiatry checks  -- Following with endocrinology  -- Currently on insulin  -- Hemoglobin A1c 8.2  -- Needs better diabetic control  -- Nephrotic range proteinuria suspected diabetic nephropathy    Hypertension  -- BMI 46.3  -- Has not taken his blood pressure medications in a few days as he ran out  -- I have refilled his amlodipine 10 mg daily  -- I have refilled his labetalol 100 mg twice a day  -- Low 2 g sodium diet  -- If evidence of volume overload we will plan to start torsemide    Orders:    ergocalciferol (ERGOCALCIFEROL) 1.25 MG (45625 UT) capsule; Take 1 capsule (50,000 Units total) by mouth once a week    Ambulatory Referral to CKD Education Program; Future    Ambulatory Referral to Advanced Care Planning; Future

## 2024-10-08 NOTE — ASSESSMENT & PLAN NOTE
Lab Results   Component Value Date    EGFR 21 10/07/2024    EGFR 22 07/19/2024    EGFR 28 (L) 07/19/2024    CREATININE 3.07 (H) 10/07/2024    CREATININE 2.89 (H) 07/19/2024    CREATININE 2.80 (H) 06/04/2024   -- Phosphorus is normal  -- Calcium is normal  -- Magnesium slightly low at 1.6  -- Vitamin D 25-hydroxy level less than 7  -- Worsening PTH at 187.1  -- Replenish vitamin D stores by starting ergocalciferol 50,000 units weekly    Orders:    ergocalciferol (ERGOCALCIFEROL) 1.25 MG (45662 UT) capsule; Take 1 capsule (50,000 Units total) by mouth once a week    Ambulatory Referral to CKD Education Program; Future    Ambulatory Referral to Advanced Care Planning; Future

## 2024-10-08 NOTE — ASSESSMENT & PLAN NOTE
-- Suspected underlying progression of disease due to nephrotic range proteinuria with poorly controlled diabetes and hypertension  Orders:    ergocalciferol (ERGOCALCIFEROL) 1.25 MG (06575 UT) capsule; Take 1 capsule (50,000 Units total) by mouth once a week    Ambulatory Referral to CKD Education Program; Future    Ambulatory Referral to Advanced Care Planning; Future

## 2024-10-09 LAB
CYSTATIN C SERPL-MCNC: 2.76 MG/L (ref 0.67–1.14)
GFR/BSA.PRED SERPLBLD CYS-BASED-ARV: 20 ML/MIN/1.73

## 2024-10-13 RX ORDER — SODIUM CHLORIDE, SODIUM LACTATE, POTASSIUM CHLORIDE, CALCIUM CHLORIDE 600; 310; 30; 20 MG/100ML; MG/100ML; MG/100ML; MG/100ML
125 INJECTION, SOLUTION INTRAVENOUS CONTINUOUS
Status: CANCELLED | OUTPATIENT
Start: 2024-10-13

## 2024-10-14 ENCOUNTER — APPOINTMENT (EMERGENCY)
Dept: CT IMAGING | Facility: HOSPITAL | Age: 59
DRG: 061 | End: 2024-10-14
Payer: MEDICARE

## 2024-10-14 ENCOUNTER — HOSPITAL ENCOUNTER (INPATIENT)
Facility: HOSPITAL | Age: 59
LOS: 11 days | DRG: 061 | End: 2024-10-26
Attending: EMERGENCY MEDICINE | Admitting: INTERNAL MEDICINE
Payer: MEDICARE

## 2024-10-14 ENCOUNTER — ANESTHESIA EVENT (OUTPATIENT)
Dept: GASTROENTEROLOGY | Facility: HOSPITAL | Age: 59
End: 2024-10-14
Payer: MEDICARE

## 2024-10-14 ENCOUNTER — ANESTHESIA (OUTPATIENT)
Dept: GASTROENTEROLOGY | Facility: HOSPITAL | Age: 59
End: 2024-10-14
Payer: MEDICARE

## 2024-10-14 ENCOUNTER — HOSPITAL ENCOUNTER (OUTPATIENT)
Dept: GASTROENTEROLOGY | Facility: HOSPITAL | Age: 59
Setting detail: OUTPATIENT SURGERY
Discharge: HOME | End: 2024-10-14
Attending: INTERNAL MEDICINE
Payer: MEDICARE

## 2024-10-14 VITALS
SYSTOLIC BLOOD PRESSURE: 151 MMHG | TEMPERATURE: 97.4 F | HEART RATE: 97 BPM | HEIGHT: 65 IN | RESPIRATION RATE: 16 BRPM | DIASTOLIC BLOOD PRESSURE: 87 MMHG | BODY MASS INDEX: 46.82 KG/M2 | WEIGHT: 281 LBS | OXYGEN SATURATION: 100 %

## 2024-10-14 DIAGNOSIS — R52 PAIN: ICD-10-CM

## 2024-10-14 DIAGNOSIS — R53.1 RIGHT SIDED WEAKNESS: ICD-10-CM

## 2024-10-14 DIAGNOSIS — I10 HYPERTENSION: ICD-10-CM

## 2024-10-14 DIAGNOSIS — I12.9 TYPE 2 DIABETES MELLITUS WITH STAGE 4 CHRONIC KIDNEY DISEASE AND HYPERTENSION (HCC): ICD-10-CM

## 2024-10-14 DIAGNOSIS — R63.4 UNINTENDED WEIGHT LOSS: ICD-10-CM

## 2024-10-14 DIAGNOSIS — K21.9 GASTROESOPHAGEAL REFLUX DISEASE, UNSPECIFIED WHETHER ESOPHAGITIS PRESENT: ICD-10-CM

## 2024-10-14 DIAGNOSIS — R50.9 FEVER: ICD-10-CM

## 2024-10-14 DIAGNOSIS — K59.00 CONSTIPATION: ICD-10-CM

## 2024-10-14 DIAGNOSIS — E11.22 TYPE 2 DIABETES MELLITUS WITH STAGE 4 CHRONIC KIDNEY DISEASE AND HYPERTENSION (HCC): ICD-10-CM

## 2024-10-14 DIAGNOSIS — R29.90 STROKE-LIKE SYMPTOMS: Primary | ICD-10-CM

## 2024-10-14 DIAGNOSIS — G24.01 TARDIVE DYSKINESIA: ICD-10-CM

## 2024-10-14 DIAGNOSIS — Z79.899 ON DEEP VEIN THROMBOSIS (DVT) PROPHYLAXIS: ICD-10-CM

## 2024-10-14 DIAGNOSIS — I63.9 CVA (CEREBRAL VASCULAR ACCIDENT) (HCC): ICD-10-CM

## 2024-10-14 DIAGNOSIS — N18.4 TYPE 2 DIABETES MELLITUS WITH STAGE 4 CHRONIC KIDNEY DISEASE AND HYPERTENSION (HCC): ICD-10-CM

## 2024-10-14 DIAGNOSIS — G93.40 ENCEPHALOPATHY: ICD-10-CM

## 2024-10-14 DIAGNOSIS — R12 HEARTBURN: ICD-10-CM

## 2024-10-14 DIAGNOSIS — F31.5 BIPOLAR I DISORDER, SEVERE, CURRENT OR MOST RECENT EPISODE DEPRESSED, WITH PSYCHOTIC FEATURES (HCC): ICD-10-CM

## 2024-10-14 LAB
ALBUMIN SERPL BCG-MCNC: 2.9 G/DL (ref 3.5–5)
ALP SERPL-CCNC: 77 U/L (ref 34–104)
ALT SERPL W P-5'-P-CCNC: 10 U/L (ref 7–52)
ANION GAP SERPL CALCULATED.3IONS-SCNC: 8 MMOL/L (ref 4–13)
APTT PPP: 26 SECONDS (ref 23–34)
AST SERPL W P-5'-P-CCNC: 13 U/L (ref 13–39)
BILIRUB DIRECT SERPL-MCNC: 0.04 MG/DL (ref 0–0.2)
BILIRUB SERPL-MCNC: 0.26 MG/DL (ref 0.2–1)
BUN SERPL-MCNC: 29 MG/DL (ref 5–25)
CALCIUM SERPL-MCNC: 8.2 MG/DL (ref 8.4–10.2)
CARDIAC TROPONIN I PNL SERPL HS: 16 NG/L
CHLORIDE SERPL-SCNC: 104 MMOL/L (ref 96–108)
CO2 SERPL-SCNC: 22 MMOL/L (ref 21–32)
CREAT SERPL-MCNC: 3.24 MG/DL (ref 0.6–1.3)
ERYTHROCYTE [DISTWIDTH] IN BLOOD BY AUTOMATED COUNT: 14.6 % (ref 11.6–15.1)
GFR SERPL CREATININE-BSD FRML MDRD: 19 ML/MIN/1.73SQ M
GLUCOSE SERPL-MCNC: 160 MG/DL (ref 65–140)
GLUCOSE SERPL-MCNC: 162 MG/DL (ref 65–140)
GLUCOSE SERPL-MCNC: 171 MG/DL (ref 65–140)
HCT VFR BLD AUTO: 37.1 % (ref 36.5–49.3)
HGB BLD-MCNC: 12.1 G/DL (ref 12–17)
INR PPP: 1 (ref 0.85–1.19)
MCH RBC QN AUTO: 26.1 PG (ref 26.8–34.3)
MCHC RBC AUTO-ENTMCNC: 32.6 G/DL (ref 31.4–37.4)
MCV RBC AUTO: 80 FL (ref 82–98)
PLATELET # BLD AUTO: 212 THOUSANDS/UL (ref 149–390)
PMV BLD AUTO: 9.9 FL (ref 8.9–12.7)
POTASSIUM SERPL-SCNC: 4.2 MMOL/L (ref 3.5–5.3)
PROT SERPL-MCNC: 5.9 G/DL (ref 6.4–8.4)
PROTHROMBIN TIME: 13.9 SECONDS (ref 12.3–15)
RBC # BLD AUTO: 4.63 MILLION/UL (ref 3.88–5.62)
SODIUM SERPL-SCNC: 134 MMOL/L (ref 135–147)
WBC # BLD AUTO: 8.76 THOUSAND/UL (ref 4.31–10.16)

## 2024-10-14 PROCEDURE — 85730 THROMBOPLASTIN TIME PARTIAL: CPT

## 2024-10-14 PROCEDURE — 36415 COLL VENOUS BLD VENIPUNCTURE: CPT

## 2024-10-14 PROCEDURE — 43239 EGD BIOPSY SINGLE/MULTIPLE: CPT | Performed by: INTERNAL MEDICINE

## 2024-10-14 PROCEDURE — 82948 REAGENT STRIP/BLOOD GLUCOSE: CPT

## 2024-10-14 PROCEDURE — 93005 ELECTROCARDIOGRAM TRACING: CPT

## 2024-10-14 PROCEDURE — 80076 HEPATIC FUNCTION PANEL: CPT

## 2024-10-14 PROCEDURE — 70498 CT ANGIOGRAPHY NECK: CPT

## 2024-10-14 PROCEDURE — 84484 ASSAY OF TROPONIN QUANT: CPT

## 2024-10-14 PROCEDURE — 3E03317 INTRODUCTION OF OTHER THROMBOLYTIC INTO PERIPHERAL VEIN, PERCUTANEOUS APPROACH: ICD-10-PCS | Performed by: EMERGENCY MEDICINE

## 2024-10-14 PROCEDURE — 0DB78ZX EXCISION OF STOMACH, PYLORUS, VIA NATURAL OR ARTIFICIAL OPENING ENDOSCOPIC, DIAGNOSTIC: ICD-10-PCS | Performed by: INTERNAL MEDICINE

## 2024-10-14 PROCEDURE — 70496 CT ANGIOGRAPHY HEAD: CPT

## 2024-10-14 PROCEDURE — 85027 COMPLETE CBC AUTOMATED: CPT

## 2024-10-14 PROCEDURE — 0DB98ZX EXCISION OF DUODENUM, VIA NATURAL OR ARTIFICIAL OPENING ENDOSCOPIC, DIAGNOSTIC: ICD-10-PCS | Performed by: INTERNAL MEDICINE

## 2024-10-14 PROCEDURE — 96365 THER/PROPH/DIAG IV INF INIT: CPT

## 2024-10-14 PROCEDURE — 96375 TX/PRO/DX INJ NEW DRUG ADDON: CPT

## 2024-10-14 PROCEDURE — 85610 PROTHROMBIN TIME: CPT

## 2024-10-14 PROCEDURE — 80048 BASIC METABOLIC PNL TOTAL CA: CPT

## 2024-10-14 PROCEDURE — 99291 CRITICAL CARE FIRST HOUR: CPT

## 2024-10-14 PROCEDURE — 0DJD8ZZ INSPECTION OF LOWER INTESTINAL TRACT, VIA NATURAL OR ARTIFICIAL OPENING ENDOSCOPIC: ICD-10-PCS | Performed by: INTERNAL MEDICINE

## 2024-10-14 PROCEDURE — 45330 DIAGNOSTIC SIGMOIDOSCOPY: CPT | Performed by: INTERNAL MEDICINE

## 2024-10-14 PROCEDURE — 88305 TISSUE EXAM BY PATHOLOGIST: CPT | Performed by: PATHOLOGY

## 2024-10-14 RX ORDER — PROPOFOL 10 MG/ML
INJECTION, EMULSION INTRAVENOUS CONTINUOUS PRN
Status: DISCONTINUED | OUTPATIENT
Start: 2024-10-14 | End: 2024-10-14

## 2024-10-14 RX ORDER — LIDOCAINE HYDROCHLORIDE 20 MG/ML
INJECTION, SOLUTION EPIDURAL; INFILTRATION; INTRACAUDAL; PERINEURAL AS NEEDED
Status: DISCONTINUED | OUTPATIENT
Start: 2024-10-14 | End: 2024-10-14

## 2024-10-14 RX ORDER — OMEPRAZOLE 40 MG/1
40 CAPSULE, DELAYED RELEASE ORAL DAILY
Qty: 90 CAPSULE | Refills: 2 | Status: SHIPPED | OUTPATIENT
Start: 2024-10-14 | End: 2024-10-26

## 2024-10-14 RX ORDER — LABETALOL HYDROCHLORIDE 5 MG/ML
20 INJECTION, SOLUTION INTRAVENOUS ONCE
Status: COMPLETED | OUTPATIENT
Start: 2024-10-14 | End: 2024-10-14

## 2024-10-14 RX ORDER — PROPOFOL 10 MG/ML
INJECTION, EMULSION INTRAVENOUS AS NEEDED
Status: DISCONTINUED | OUTPATIENT
Start: 2024-10-14 | End: 2024-10-14

## 2024-10-14 RX ORDER — FENTANYL CITRATE 50 UG/ML
INJECTION, SOLUTION INTRAMUSCULAR; INTRAVENOUS AS NEEDED
Status: DISCONTINUED | OUTPATIENT
Start: 2024-10-14 | End: 2024-10-14

## 2024-10-14 RX ORDER — SODIUM CHLORIDE, SODIUM LACTATE, POTASSIUM CHLORIDE, CALCIUM CHLORIDE 600; 310; 30; 20 MG/100ML; MG/100ML; MG/100ML; MG/100ML
INJECTION, SOLUTION INTRAVENOUS CONTINUOUS PRN
Status: DISCONTINUED | OUTPATIENT
Start: 2024-10-14 | End: 2024-10-14

## 2024-10-14 RX ADMIN — PROPOFOL 50 MG: 10 INJECTION, EMULSION INTRAVENOUS at 08:37

## 2024-10-14 RX ADMIN — LABETALOL HYDROCHLORIDE 20 MG: 5 INJECTION, SOLUTION INTRAVENOUS at 23:28

## 2024-10-14 RX ADMIN — PROPOFOL 20 MG: 10 INJECTION, EMULSION INTRAVENOUS at 08:34

## 2024-10-14 RX ADMIN — Medication 25 MG: at 23:40

## 2024-10-14 RX ADMIN — SODIUM CHLORIDE, SODIUM LACTATE, POTASSIUM CHLORIDE, AND CALCIUM CHLORIDE: .6; .31; .03; .02 INJECTION, SOLUTION INTRAVENOUS at 08:27

## 2024-10-14 RX ADMIN — PROPOFOL 30 MG: 10 INJECTION, EMULSION INTRAVENOUS at 08:33

## 2024-10-14 RX ADMIN — PROPOFOL 100 MG: 10 INJECTION, EMULSION INTRAVENOUS at 08:32

## 2024-10-14 RX ADMIN — PROPOFOL 100 MCG/KG/MIN: 10 INJECTION, EMULSION INTRAVENOUS at 08:39

## 2024-10-14 RX ADMIN — NICARDIPINE HYDROCHLORIDE 5 MG/HR: 2.5 INJECTION, SOLUTION INTRAVENOUS at 23:40

## 2024-10-14 RX ADMIN — FENTANYL CITRATE 50 MCG: 50 INJECTION INTRAMUSCULAR; INTRAVENOUS at 08:30

## 2024-10-14 RX ADMIN — LIDOCAINE HYDROCHLORIDE 5 ML: 20 INJECTION, SOLUTION EPIDURAL; INFILTRATION; INTRACAUDAL at 08:32

## 2024-10-14 NOTE — ANESTHESIA POSTPROCEDURE EVALUATION
Post-Op Assessment Note    CV Status:  Stable  Pain Score: 0    Pain management: adequate       Mental Status:  Alert and awake   Hydration Status:  Stable and euvolemic   PONV Controlled:  None   Airway Patency:  Patent     Post Op Vitals Reviewed: Yes    No anethesia notable event occurred.    Staff: CRNA           Last Filed PACU Vitals:  Vitals Value Taken Time   Temp 97.4 °F (36.3 °C) 10/14/24 0851   Pulse 107 10/14/24 0851   /95 10/14/24 0851   Resp     SpO2 23 % 10/14/24 0851       Modified Dario:  Activity: 2 (10/14/2024  8:51 AM)  Respiration: 2 (10/14/2024  8:51 AM)  Circulation: 2 (10/14/2024  8:51 AM)  Consciousness: 1 (10/14/2024  8:51 AM)  Oxygen Saturation: 2 (10/14/2024  8:51 AM)  Modified Dario Score: 9 (10/14/2024  8:51 AM)

## 2024-10-14 NOTE — ANESTHESIA PREPROCEDURE EVALUATION
"Procedure:  COLONOSCOPY  EGD    Relevant Problems   CARDIO   (+) Hyperlipidemia   (+) Primary hypertension      ENDO   (+) Type 2 diabetes mellitus with hyperglycemia, without long-term current use of insulin (HCC)   (+) Type 2 diabetes mellitus with stage 4 chronic kidney disease and hypertension (HCC)      GI/HEPATIC   (+) GERD (gastroesophageal reflux disease)   (+) Hiatal hernia      /RENAL   (+) 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 (HCC)      HEMATOLOGY   (+) Anemia, unspecified      MUSCULOSKELETAL   (+) Hiatal hernia      NEURO/PSYCH   (+) Diabetic polyneuropathy (HCC)   (+) Generalized anxiety disorder   (+) Panic attacks      PULMONARY   (+) DAISY (obstructive sleep apnea)     Not currently using CPAP  Stage 4 CKD   Not currently on GLP-1 agonist       Lab Results   Component Value Date    WBC 6.75 10/07/2024    HGB 11.4 (L) 10/07/2024    HCT 34.9 (L) 10/07/2024    MCV 79 (L) 10/07/2024     10/07/2024     Lab Results   Component Value Date    SODIUM 133 (L) 10/07/2024    K 4.9 10/07/2024     10/07/2024    CO2 25 10/07/2024    BUN 23 10/07/2024    CREATININE 3.07 (H) 10/07/2024    GLUC 243 (H) 06/04/2024    CALCIUM 8.4 10/07/2024     No results found for: \"INR\", \"PROTIME\"  Lab Results   Component Value Date    HGBA1C 8.2 (A) 09/25/2024             Physical Exam    Airway    Mallampati score: II  TM Distance: >3 FB  Neck ROM: full     Dental   No notable dental hx     Cardiovascular      Pulmonary      Other Findings        Anesthesia Plan  ASA Score- 3     Anesthesia Type- IV sedation with anesthesia with ASA Monitors.         Additional Monitors:     Airway Plan:            Plan Factors-    Chart reviewed.   Existing labs reviewed. Patient summary reviewed.    Patient is not a current smoker.      Obstructive sleep apnea risk education given perioperatively.        Induction- intravenous.    Postoperative Plan-     Perioperative " Resuscitation Plan - Level 1 - Full Code.       Informed Consent- Anesthetic plan and risks discussed with patient.  I personally reviewed this patient with the CRNA. Discussed and agreed on the Anesthesia Plan with the CRNA..

## 2024-10-14 NOTE — ANESTHESIA POSTPROCEDURE EVALUATION
Post-Op Assessment Note    CV Status:  Stable    Pain management: adequate       Mental Status:  Alert and awake   Hydration Status:  Stable   PONV Controlled:  None   Airway Patency:  Patent     Post Op Vitals Reviewed: Yes    No anethesia notable event occurred.    Staff: Anesthesiologist           Last Filed PACU Vitals:  Vitals Value Taken Time   Temp 97.4 °F (36.3 °C) 10/14/24 0851   Pulse 97 10/14/24 0911   /87 10/14/24 0911   Resp 16 10/14/24 0911   SpO2 100 % 10/14/24 0911       Modified Dario:  Activity: 2 (10/14/2024  9:11 AM)  Respiration: 2 (10/14/2024  9:11 AM)  Circulation: 1 (10/14/2024  9:11 AM)  Consciousness: 2 (10/14/2024  9:11 AM)  Oxygen Saturation: 2 (10/14/2024  9:11 AM)  Modified Dario Score: 9 (10/14/2024  9:11 AM)

## 2024-10-14 NOTE — H&P
History and Physical - [facility]  Tommie Taylor 59 y.o. male MRN: [id number]    HPI: Tommie Taylor is a 59 y.o. year old male who presents for evaluation of weight loss, GERD and iron deficiency anemia.      Review of Systems    Historical Information   Past Medical History:   Diagnosis Date    ADHD, adult residual type     Anxiety     Anxiety     Bipolar 1 disorder (HCC)     Cataract     Left eye    Chronic kidney disease     Colon polyp     CPAP (continuous positive airway pressure) dependence     Depression     Depression     Diabetes mellitus (HCC)     blood sugar 167 on admission    Equinus deformity of foot     GERD (gastroesophageal reflux disease)     Homicidal ideations     Hyperlipidemia     Hypertension     Microalbuminuria     Morbid obesity (HCC)     Neuropathy     Shortness of breath     Sleep apnea     CPAP at bedtime    Sleep apnea     Stroke (HCC)     Suicidal intent      Past Surgical History:   Procedure Laterality Date    CATARACT EXTRACTION      CATARACT EXTRACTION      COLONOSCOPY      HAND SURGERY Right     OTHER SURGICAL HISTORY      REPAIR OF SUPERFICIAL WOUND FACE    MN ESOPHAGOGASTRODUODENOSCOPY TRANSORAL DIAGNOSTIC N/A 2018    Procedure: ESOPHAGOGASTRODUODENOSCOPY (EGD);  Surgeon: Yinka Maier MD;  Location: [facility];  Service: Gastroenterology    MN ESOPHAGOGASTRODUODENOSCOPY TRANSORAL DIAGNOSTIC N/A 2018    Procedure: EGD AND COLONOSCOPY;  Surgeon: Yinka Maier MD;  Location: [facility];  Service: Gastroenterology     Social History   Social History     Substance and Sexual Activity   Alcohol Use Not Currently    Comment: rarely     Social History     Substance and Sexual Activity   Drug Use Not Currently    Comment: quit 20 years ago     Social History     Tobacco Use   Smoking Status Former    Current packs/day: 0.00    Types: Cigarettes    Quit date:     Years since quittin.8   Smokeless Tobacco Current    Types: Chew   Tobacco  "Comments    pt \"Quit after approx over 25 years ago\"     Family History   Problem Relation Age of Onset    Diabetes Mother     Alcohol abuse Father     Diabetes Father     Hypertension Father     Lung cancer Father     Stroke Father     Cancer Father         lung    Alcohol abuse Sister     Depression Sister     Lymphoma Sister     Alcohol abuse Family     Alcohol abuse Sister     Alcohol abuse Sister     Cancer Sister     Heart disease Neg Hx     Thyroid disease Neg Hx        Meds/Allergies     Not in a hospital admission.    Allergies   Allergen Reactions    Pollen Extract Nasal Congestion    Tetanus Toxoid Swelling       Objective     BP (!) 199/97   Pulse 93   Temp (!) 96.8 °F (36 °C) (Temporal)   Resp 22   Ht 5' 5\" (1.651 m)   Wt 127 kg (281 lb)   SpO2 99%   BMI 46.76 kg/m²       PHYSICAL EXAM    Gen: NAD  CV: RRR  CHEST: Clear  ABD: soft, NT/ND  EXT: no edema  Neuro: AAO      ASSESSMENT/PLAN:  This is a 59 y.o. year old male here for weight loss, iron deficiency anemia and GERD.    PLAN:   Procedure: EGD and colonoscopy.      "

## 2024-10-15 ENCOUNTER — TELEPHONE (OUTPATIENT)
Age: 59
End: 2024-10-15

## 2024-10-15 ENCOUNTER — APPOINTMENT (INPATIENT)
Dept: NON INVASIVE DIAGNOSTICS | Facility: HOSPITAL | Age: 59
DRG: 061 | End: 2024-10-15
Payer: MEDICARE

## 2024-10-15 ENCOUNTER — APPOINTMENT (INPATIENT)
Dept: CT IMAGING | Facility: HOSPITAL | Age: 59
DRG: 061 | End: 2024-10-15
Payer: MEDICARE

## 2024-10-15 ENCOUNTER — APPOINTMENT (EMERGENCY)
Dept: CT IMAGING | Facility: HOSPITAL | Age: 59
DRG: 061 | End: 2024-10-15
Payer: MEDICARE

## 2024-10-15 PROBLEM — I16.1 HYPERTENSIVE EMERGENCY: Status: ACTIVE | Noted: 2024-07-24

## 2024-10-15 PROBLEM — I63.9 CVA (CEREBRAL VASCULAR ACCIDENT) (HCC): Status: ACTIVE | Noted: 2024-10-15

## 2024-10-15 LAB
2HR DELTA HS TROPONIN: 1 NG/L
4HR DELTA HS TROPONIN: 4 NG/L
AMPHETAMINES SERPL QL SCN: NEGATIVE
ANION GAP SERPL CALCULATED.3IONS-SCNC: 6 MMOL/L (ref 4–13)
AORTIC ROOT: 3.1 CM
APICAL FOUR CHAMBER EJECTION FRACTION: 60 %
ASCENDING AORTA: 3.1 CM
ATRIAL RATE: 91 BPM
BARBITURATES UR QL: NEGATIVE
BASOPHILS # BLD AUTO: 0.04 THOUSANDS/ΜL (ref 0–0.1)
BASOPHILS NFR BLD AUTO: 1 % (ref 0–1)
BENZODIAZ UR QL: NEGATIVE
BSA FOR ECHO PROCEDURE: 2.35 M2
BUN SERPL-MCNC: 32 MG/DL (ref 5–25)
CALCIUM SERPL-MCNC: 8.2 MG/DL (ref 8.4–10.2)
CARDIAC TROPONIN I PNL SERPL HS: 17 NG/L
CARDIAC TROPONIN I PNL SERPL HS: 20 NG/L
CHLORIDE SERPL-SCNC: 106 MMOL/L (ref 96–108)
CHOLEST SERPL-MCNC: 149 MG/DL
CO2 SERPL-SCNC: 22 MMOL/L (ref 21–32)
COCAINE UR QL: NEGATIVE
CREAT SERPL-MCNC: 3.08 MG/DL (ref 0.6–1.3)
DME PARACHUTE DELIVERY DATE EXPECTED: NORMAL
DME PARACHUTE DELIVERY DATE REQUESTED: NORMAL
DME PARACHUTE ITEM DESCRIPTION: NORMAL
DME PARACHUTE ORDER STATUS: NORMAL
DME PARACHUTE SUPPLIER NAME: NORMAL
DME PARACHUTE SUPPLIER PHONE: NORMAL
E WAVE DECELERATION TIME: 188 MS
E/A RATIO: 1.18
EOSINOPHIL # BLD AUTO: 0.27 THOUSAND/ΜL (ref 0–0.61)
EOSINOPHIL NFR BLD AUTO: 3 % (ref 0–6)
ERYTHROCYTE [DISTWIDTH] IN BLOOD BY AUTOMATED COUNT: 14.6 % (ref 11.6–15.1)
FENTANYL UR QL SCN: NEGATIVE
FERRITIN SERPL-MCNC: 111 NG/ML (ref 24–336)
FRACTIONAL SHORTENING: 26 (ref 28–44)
GFR SERPL CREATININE-BSD FRML MDRD: 21 ML/MIN/1.73SQ M
GLUCOSE SERPL-MCNC: 106 MG/DL (ref 65–140)
GLUCOSE SERPL-MCNC: 198 MG/DL (ref 65–140)
GLUCOSE SERPL-MCNC: 250 MG/DL (ref 65–140)
HCT VFR BLD AUTO: 32.1 % (ref 36.5–49.3)
HDLC SERPL-MCNC: 42 MG/DL
HGB BLD-MCNC: 10.9 G/DL (ref 12–17)
HYDROCODONE UR QL SCN: NEGATIVE
IMM GRANULOCYTES # BLD AUTO: 0.05 THOUSAND/UL (ref 0–0.2)
IMM GRANULOCYTES NFR BLD AUTO: 1 % (ref 0–2)
INTERVENTRICULAR SEPTUM IN DIASTOLE (PARASTERNAL SHORT AXIS VIEW): 2 CM
INTERVENTRICULAR SEPTUM: 2 CM (ref 0.6–1.1)
IRON SATN MFR SERPL: 38 % (ref 15–50)
IRON SERPL-MCNC: 77 UG/DL (ref 50–212)
LAAS-AP2: 22 CM2
LAAS-AP4: 22.7 CM2
LDLC SERPL CALC-MCNC: 77 MG/DL (ref 0–100)
LEFT ATRIUM SIZE: 4.6 CM
LEFT ATRIUM VOLUME (MOD BIPLANE): 74 ML
LEFT ATRIUM VOLUME INDEX (MOD BIPLANE): 31.5 ML/M2
LEFT INTERNAL DIMENSION IN SYSTOLE: 2.9 CM (ref 2.1–4)
LEFT VENTRICULAR INTERNAL DIMENSION IN DIASTOLE: 3.9 CM (ref 3.5–6)
LEFT VENTRICULAR POSTERIOR WALL IN END DIASTOLE: 1.6 CM
LEFT VENTRICULAR STROKE VOLUME: 33 ML
LVSV (TEICH): 33 ML
LYMPHOCYTES # BLD AUTO: 1.69 THOUSANDS/ΜL (ref 0.6–4.47)
LYMPHOCYTES NFR BLD AUTO: 21 % (ref 14–44)
MAGNESIUM SERPL-MCNC: 1.6 MG/DL (ref 1.9–2.7)
MCH RBC QN AUTO: 26.2 PG (ref 26.8–34.3)
MCHC RBC AUTO-ENTMCNC: 34 G/DL (ref 31.4–37.4)
MCV RBC AUTO: 77 FL (ref 82–98)
METHADONE UR QL: NEGATIVE
MONOCYTES # BLD AUTO: 0.71 THOUSAND/ΜL (ref 0.17–1.22)
MONOCYTES NFR BLD AUTO: 9 % (ref 4–12)
MV E'TISSUE VEL-SEP: 8 CM/S
MV PEAK A VEL: 0.77 M/S
MV PEAK E VEL: 91 CM/S
MV STENOSIS PRESSURE HALF TIME: 54 MS
MV VALVE AREA P 1/2 METHOD: 4.07
NEUTROPHILS # BLD AUTO: 5.3 THOUSANDS/ΜL (ref 1.85–7.62)
NEUTS SEG NFR BLD AUTO: 65 % (ref 43–75)
NRBC BLD AUTO-RTO: 0 /100 WBCS
OPIATES UR QL SCN: NEGATIVE
OXYCODONE+OXYMORPHONE UR QL SCN: NEGATIVE
P AXIS: 68 DEGREES
PCP UR QL: NEGATIVE
PHOSPHATE SERPL-MCNC: 4.2 MG/DL (ref 2.7–4.5)
PLATELET # BLD AUTO: 168 THOUSANDS/UL (ref 149–390)
PMV BLD AUTO: 9.2 FL (ref 8.9–12.7)
POTASSIUM SERPL-SCNC: 4.1 MMOL/L (ref 3.5–5.3)
PR INTERVAL: 176 MS
QRS AXIS: 54 DEGREES
QRSD INTERVAL: 80 MS
QT INTERVAL: 362 MS
QTC INTERVAL: 445 MS
RBC # BLD AUTO: 4.16 MILLION/UL (ref 3.88–5.62)
RIGHT ATRIAL 2D VOLUME: 35 ML
RIGHT ATRIUM AREA SYSTOLE A4C: 14.2 CM2
RIGHT VENTRICLE ID DIMENSION: 4.2 CM
SL CV ECHO LV DYNAMIC OBSTRUCTION PEAK GRADIENT (VALSAL: 36 MMHG
SL CV LEFT ATRIUM LENGTH A2C: 5.3 CM
SL CV LV EF: 70
SL CV PED ECHO LEFT VENTRICLE DIASTOLIC VOLUME (MOD BIPLANE) 2D: 65 ML
SL CV PED ECHO LEFT VENTRICLE SYSTOLIC VOLUME (MOD BIPLANE) 2D: 32 ML
SODIUM SERPL-SCNC: 134 MMOL/L (ref 135–147)
T WAVE AXIS: 43 DEGREES
THC UR QL: NEGATIVE
TIBC SERPL-MCNC: 204 UG/DL (ref 250–450)
TRICUSPID ANNULAR PLANE SYSTOLIC EXCURSION: 2.2 CM
TRIGL SERPL-MCNC: 149 MG/DL
UIBC SERPL-MCNC: 127 UG/DL (ref 155–355)
VENTRICULAR RATE: 91 BPM
WBC # BLD AUTO: 8.06 THOUSAND/UL (ref 4.31–10.16)

## 2024-10-15 PROCEDURE — 96374 THER/PROPH/DIAG INJ IV PUSH: CPT

## 2024-10-15 PROCEDURE — 84484 ASSAY OF TROPONIN QUANT: CPT

## 2024-10-15 PROCEDURE — 93010 ELECTROCARDIOGRAM REPORT: CPT | Performed by: INTERNAL MEDICINE

## 2024-10-15 PROCEDURE — 82948 REAGENT STRIP/BLOOD GLUCOSE: CPT

## 2024-10-15 PROCEDURE — 83540 ASSAY OF IRON: CPT

## 2024-10-15 PROCEDURE — 97116 GAIT TRAINING THERAPY: CPT

## 2024-10-15 PROCEDURE — 82728 ASSAY OF FERRITIN: CPT

## 2024-10-15 PROCEDURE — 93306 TTE W/DOPPLER COMPLETE: CPT

## 2024-10-15 PROCEDURE — 99223 1ST HOSP IP/OBS HIGH 75: CPT | Performed by: PSYCHIATRY & NEUROLOGY

## 2024-10-15 PROCEDURE — 83735 ASSAY OF MAGNESIUM: CPT

## 2024-10-15 PROCEDURE — 80307 DRUG TEST PRSMV CHEM ANLYZR: CPT

## 2024-10-15 PROCEDURE — 84100 ASSAY OF PHOSPHORUS: CPT

## 2024-10-15 PROCEDURE — 80048 BASIC METABOLIC PNL TOTAL CA: CPT

## 2024-10-15 PROCEDURE — 92610 EVALUATE SWALLOWING FUNCTION: CPT

## 2024-10-15 PROCEDURE — 70450 CT HEAD/BRAIN W/O DYE: CPT

## 2024-10-15 PROCEDURE — 85025 COMPLETE CBC W/AUTO DIFF WBC: CPT

## 2024-10-15 PROCEDURE — 97163 PT EVAL HIGH COMPLEX 45 MIN: CPT

## 2024-10-15 PROCEDURE — 80061 LIPID PANEL: CPT

## 2024-10-15 PROCEDURE — 97167 OT EVAL HIGH COMPLEX 60 MIN: CPT

## 2024-10-15 PROCEDURE — 83550 IRON BINDING TEST: CPT

## 2024-10-15 PROCEDURE — 99223 1ST HOSP IP/OBS HIGH 75: CPT | Performed by: INTERNAL MEDICINE

## 2024-10-15 PROCEDURE — 83036 HEMOGLOBIN GLYCOSYLATED A1C: CPT

## 2024-10-15 PROCEDURE — 36415 COLL VENOUS BLD VENIPUNCTURE: CPT

## 2024-10-15 PROCEDURE — 93306 TTE W/DOPPLER COMPLETE: CPT | Performed by: INTERNAL MEDICINE

## 2024-10-15 PROCEDURE — 96375 TX/PRO/DX INJ NEW DRUG ADDON: CPT

## 2024-10-15 RX ORDER — AMLODIPINE BESYLATE 10 MG/1
10 TABLET ORAL DAILY
Status: DISCONTINUED | OUTPATIENT
Start: 2024-10-15 | End: 2024-10-17

## 2024-10-15 RX ORDER — CHLORHEXIDINE GLUCONATE ORAL RINSE 1.2 MG/ML
15 SOLUTION DENTAL EVERY 12 HOURS SCHEDULED
Status: DISCONTINUED | OUTPATIENT
Start: 2024-10-15 | End: 2024-10-26 | Stop reason: HOSPADM

## 2024-10-15 RX ORDER — AMLODIPINE BESYLATE 5 MG/1
10 TABLET ORAL DAILY
Status: DISCONTINUED | OUTPATIENT
Start: 2024-10-15 | End: 2024-10-15

## 2024-10-15 RX ORDER — ATORVASTATIN CALCIUM 40 MG/1
40 TABLET, FILM COATED ORAL EVERY EVENING
Status: DISCONTINUED | OUTPATIENT
Start: 2024-10-15 | End: 2024-10-26 | Stop reason: HOSPADM

## 2024-10-15 RX ORDER — DIPHENHYDRAMINE HYDROCHLORIDE 50 MG/ML
25 INJECTION INTRAMUSCULAR; INTRAVENOUS ONCE
Status: COMPLETED | OUTPATIENT
Start: 2024-10-15 | End: 2024-10-15

## 2024-10-15 RX ORDER — FAMOTIDINE 10 MG/ML
20 INJECTION, SOLUTION INTRAVENOUS ONCE
Status: COMPLETED | OUTPATIENT
Start: 2024-10-15 | End: 2024-10-15

## 2024-10-15 RX ORDER — MAGNESIUM SULFATE HEPTAHYDRATE 40 MG/ML
2 INJECTION, SOLUTION INTRAVENOUS ONCE
Status: COMPLETED | OUTPATIENT
Start: 2024-10-15 | End: 2024-10-15

## 2024-10-15 RX ORDER — INSULIN LISPRO 100 [IU]/ML
2-12 INJECTION, SOLUTION INTRAVENOUS; SUBCUTANEOUS EVERY 6 HOURS SCHEDULED
Status: DISCONTINUED | OUTPATIENT
Start: 2024-10-15 | End: 2024-10-15

## 2024-10-15 RX ORDER — PANTOPRAZOLE SODIUM 40 MG/1
40 TABLET, DELAYED RELEASE ORAL
Status: DISCONTINUED | OUTPATIENT
Start: 2024-10-15 | End: 2024-10-26 | Stop reason: HOSPADM

## 2024-10-15 RX ORDER — INSULIN LISPRO 100 [IU]/ML
2-12 INJECTION, SOLUTION INTRAVENOUS; SUBCUTANEOUS
Status: DISCONTINUED | OUTPATIENT
Start: 2024-10-15 | End: 2024-10-26 | Stop reason: HOSPADM

## 2024-10-15 RX ORDER — ONDANSETRON 2 MG/ML
4 INJECTION INTRAMUSCULAR; INTRAVENOUS ONCE
Status: COMPLETED | OUTPATIENT
Start: 2024-10-15 | End: 2024-10-15

## 2024-10-15 RX ORDER — OLANZAPINE 10 MG/1
10 TABLET ORAL
Status: DISCONTINUED | OUTPATIENT
Start: 2024-10-15 | End: 2024-10-26 | Stop reason: HOSPADM

## 2024-10-15 RX ORDER — POLYETHYLENE GLYCOL 3350 17 G/17G
17 POWDER, FOR SOLUTION ORAL DAILY PRN
Status: DISCONTINUED | OUTPATIENT
Start: 2024-10-15 | End: 2024-10-26 | Stop reason: HOSPADM

## 2024-10-15 RX ORDER — TRAZODONE HYDROCHLORIDE 50 MG/1
50 TABLET, FILM COATED ORAL
Status: DISCONTINUED | OUTPATIENT
Start: 2024-10-15 | End: 2024-10-26 | Stop reason: HOSPADM

## 2024-10-15 RX ORDER — LABETALOL 100 MG/1
100 TABLET, FILM COATED ORAL 2 TIMES DAILY
Status: DISCONTINUED | OUTPATIENT
Start: 2024-10-15 | End: 2024-10-18

## 2024-10-15 RX ORDER — ATORVASTATIN CALCIUM 20 MG/1
20 TABLET, FILM COATED ORAL DAILY
Status: DISCONTINUED | OUTPATIENT
Start: 2024-10-15 | End: 2024-10-15

## 2024-10-15 RX ORDER — BUPROPION HYDROCHLORIDE 100 MG/1
100 TABLET ORAL 2 TIMES DAILY
Status: DISCONTINUED | OUTPATIENT
Start: 2024-10-15 | End: 2024-10-26 | Stop reason: HOSPADM

## 2024-10-15 RX ADMIN — PANTOPRAZOLE SODIUM 40 MG: 40 TABLET, DELAYED RELEASE ORAL at 05:53

## 2024-10-15 RX ADMIN — AMLODIPINE BESYLATE 10 MG: 10 TABLET ORAL at 11:16

## 2024-10-15 RX ADMIN — INSULIN LISPRO 6 UNITS: 100 INJECTION, SOLUTION INTRAVENOUS; SUBCUTANEOUS at 13:00

## 2024-10-15 RX ADMIN — MAGNESIUM SULFATE HEPTAHYDRATE 2 G: 40 INJECTION, SOLUTION INTRAVENOUS at 08:26

## 2024-10-15 RX ADMIN — FAMOTIDINE 20 MG: 10 INJECTION INTRAVENOUS at 00:51

## 2024-10-15 RX ADMIN — ATORVASTATIN CALCIUM 40 MG: 40 TABLET, FILM COATED ORAL at 18:06

## 2024-10-15 RX ADMIN — ONDANSETRON 4 MG: 2 INJECTION INTRAMUSCULAR; INTRAVENOUS at 00:46

## 2024-10-15 RX ADMIN — BUPROPION HYDROCHLORIDE TABLETS 100 MG: 100 TABLET, FILM COATED ORAL at 18:06

## 2024-10-15 RX ADMIN — LABETALOL HYDROCHLORIDE 100 MG: 100 TABLET, FILM COATED ORAL at 08:25

## 2024-10-15 RX ADMIN — DIPHENHYDRAMINE HYDROCHLORIDE 25 MG: 50 INJECTION, SOLUTION INTRAMUSCULAR; INTRAVENOUS at 00:50

## 2024-10-15 RX ADMIN — BUPROPION HYDROCHLORIDE TABLETS 100 MG: 100 TABLET, FILM COATED ORAL at 08:25

## 2024-10-15 RX ADMIN — OLANZAPINE 10 MG: 10 TABLET, FILM COATED ORAL at 22:06

## 2024-10-15 RX ADMIN — CHLORHEXIDINE GLUCONATE 15 ML: 1.2 RINSE ORAL at 08:28

## 2024-10-15 RX ADMIN — CHLORHEXIDINE GLUCONATE 15 ML: 1.2 RINSE ORAL at 22:06

## 2024-10-15 RX ADMIN — LABETALOL HYDROCHLORIDE 100 MG: 100 TABLET, FILM COATED ORAL at 18:06

## 2024-10-15 NOTE — OCCUPATIONAL THERAPY NOTE
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 ([facility])    Past Medical History  Past Medical History:   Diagnosis Date    ADHD, adult residual type     Anxiety     Anxiety     Bipolar 1 disorder (HCC)     Cataract     Left eye    Chronic kidney disease     Colon polyp     CPAP (continuous positive airway pressure) dependence     Depression     Depression     Diabetes mellitus ([facility])     blood sugar 167 on admission    Equinus deformity of foot     GERD (gastroesophageal reflux disease)     Homicidal ideations     Hyperlipidemia     Hypertension     Microalbuminuria     Morbid obesity ([facility])     Neuropathy     Shortness of breath     Sleep apnea     CPAP at bedtime    Sleep apnea     Stroke ([facility])     Suicidal intent      Past Surgical History  Past Surgical History:   Procedure Laterality Date    CATARACT EXTRACTION      CATARACT EXTRACTION      COLONOSCOPY      HAND SURGERY Right     OTHER SURGICAL HISTORY      REPAIR OF SUPERFICIAL WOUND FACE    AZ ESOPHAGOGASTRODUODENOSCOPY TRANSORAL DIAGNOSTIC N/A 06/14/2018    Procedure: ESOPHAGOGASTRODUODENOSCOPY (EGD);  Surgeon: Yinka Maier MD;  Location: [facility];  Service: Gastroenterology    AZ ESOPHAGOGASTRODUODENOSCOPY TRANSORAL DIAGNOSTIC N/A 09/12/2018    Procedure: EGD AND COLONOSCOPY;  Surgeon: Yinka Maier MD;  Location: [facility];  Service: Gastroenterology         10/15/24 1532   OT Last Visit   OT Visit Date 10/15/24   Note Type  "  Note type Evaluation   Pain Assessment   Pain Assessment Tool 0-10   Pain Score No Pain   Restrictions/Precautions   Weight Bearing Precautions Per Order No   Other Precautions Impulsive;Chair Alarm;Bed Alarm;Aspiration;Multiple lines;Telemetry;Fall Risk  (right sided estefania-paresis, right facial droop)   Home Living   Type of Home House   Home Layout Two level;Stairs to enter with rails;Ramped entrance  (1+1 ASHOK the home through front door, vs ramp on side of house; full flight of steps to 2nd floor bedroom; full bathroom on first floor; reports ability to maintain FFSU but would be sleeping on the couch)   Bathroom Shower/Tub Walk-in shower   Bathroom Toilet Standard   Bathroom Equipment Grab bars in shower;Shower chair   Bathroom Accessibility Accessible   Home Equipment Grab bars   Prior Function   Level of [place] Independent with ADLs;Independent with functional mobility;Needs assistance with IADLS   Lives With Spouse;Family   Receives Help From Family   IADLs Independent with meal prep;Independent with medication management;Family/Friend/Other provides transportation  (pt has not been driving recently, spouse has been providing transportation)   Falls in the last 6 months 1 to 4  (family reports <4 falls but more than 1 in the past 6 months. Pt just states \"yes\" when asked about fall hx.)   Vocational Retired  (worked in [employer])   Comments Information above obtained from patient report, EMR and case managment note as patient presents w/ significant dysarthria making conversation difficult. At baseline pt is fully independen with all aspects of self-care and mobility.   Lifestyle   Autonomy Pt lives w/ family in a 2SH. Independent with ADLs, most IADLs and functional mobility w/o use of AD. (+) falls and (-) driving   Reciprocal Relationships Supportive family   Service to Others Retired working in [employer]   Intrinsic Gratification \"Busy working around the house\"   General   Additional Pertinent " "History Pt admit with R sided weakness, fall, slurred speech, word finding difficulty, headache, hypertension; (h/o same symptoms 1 month prior but did not seek medical attention d/t resolution). +TnK, per CC and RN pt appropriate to work with <24hrs from TnK administration. CT/CTA (-) acute abnormality. Echo and MRI pending.   Family/Caregiver Present No   Additional General Comments PMHx: baseline tardive dyskinesia, 2DM, hypertension, CKD, GERD, Bipoloar I disorder, falls, polyneuropathy, flat foot/   Subjective   Subjective \"I was trying to use the urinal\"   ADL   Eating Assistance 4  Minimal Assistance   Eating Deficit Setup;Beverage management  (retrival from bedside table, use of L hand)   Grooming Assistance 3  Moderate Assistance   UB Bathing Assistance 3  Moderate Assistance   LB Bathing Assistance 2  Maximal Assistance   UB Dressing Assistance 3  Moderate Assistance   LB Dressing Assistance 2  Maximal Assistance   LB Dressing Deficit Don/doff R sock;Don/doff L sock  (limited by decreased fnxl reach, trunk instability and R estefania-paresis)   Toileting Assistance  2  Maximal Assistance   Additional Comments The above levels of assistance are anticipated based on functional performance deficits with use of clinical judgement.   Bed Mobility   Additional items   (Pt received OOB in recliner chair w/ urinal + urine all of the floor infront of him. Reports dropping the urinal when attempting to use it.)   Sit to Supine 3  Moderate assistance   Additional items Assist x 1;Increased time required;Verbal cues;LE management  (trunk managment)   Additional Comments Dependent A x 2 to reposition towards the HOB for optimal positioning   Transfers   Sit to Stand 3  Moderate assistance   Additional items Assist x 1;Increased time required;Verbal cues;Armrests;Impulsive  (use of L arm on armrests, R knee blocking, no use of AD.)   Stand to Sit 3  Moderate assistance   Additional items Assist x 1;Increased time " "required;Verbal cues  (VC for surface proximity and hand placement)   Stand pivot 2  Maximal assistance   Additional items Assist x 2;Increased time required;Verbal cues  (R knee blocking, bilateral HHA. SPT from recliner > EOB.)   Additional Comments Pt is impulsive to initiate transitional movements. VC for optimal hand placement and body mechanics for safe transfers.   Balance   Static Sitting Fair -   Dynamic Sitting Poor   Static Standing Poor   Dynamic Standing Poor -   Activity Tolerance   Activity Tolerance Patient limited by fatigue;Treatment limited secondary to medical complications (Comment);Other (Comment)  (right estefania-paresis)   Medical Staff Made Aware Spoke with PT Shruti, Coordinated w/ PCA   Nurse Made Aware Spoke with RN pre/post   RUE Assessment   RUE Assessment X   RUE Strength   RUE Overall Strength Deficits  (grasp 0/5)   R Shoulder Flexion 2-/5   R Elbow Flexion 0/5   R Elbow Extension 0/5   R Forearm Pronation 0/5   R Forearm Supination 0/5   R Wrist Flexion 0/5   R Wrist Extension 0/5   LUE Assessment   LUE Assessment WFL  (MMT 4+/5 grossly)   Hand Function   Hand Function Comments Pt is R hand dominant   Sensation   Light Touch   (denies light touch deficits when compairing RUE/LUE and RLE/LLE. Polyneuropathy at baseline)   Vision-Basic Assessment   Current Vision Wears glasses only for reading  (\"suppose to wear them\" but pt declines that he does)   Patient Visual Report Other (Comment)  (denies acute visual changes)   Vision - Complex Assessment   Acuity Able to read clock/calendar on wall without difficulty;Able to read employee name badge without difficulty  (closes L eye to do so, reports that his L eye is worse than is right. Has received injections in the past.)   Cognition   Overall Cognitive Status WFL   Arousal/Participation Alert;Responsive;Cooperative   Attention Within functional limits   Orientation Level Oriented X4   Memory Within functional limits   Following Commands " Follows multistep commands with increased time or repetition   Comments Pt ID via wristband, name and . Impulsive w/ decreased safety awareness. Overall pleasent and cooperative.   Assessment   Limitation Decreased ADL status;Decreased UE ROM;Decreased UE strength;Decreased Safe judgement during ADL;Decreased fine motor control;Decreased self-care trans;Decreased high-level ADLs  (R estefania-paresis, motor planning, safety awareness, balance, activity tolerance)   Prognosis Fair   Assessment Patient is a 59 y.o. male seen for OT evaluation following admission on 10/14/2024  s/p CVA (cerebral vascular accident) (HCC). Please see above for comprehensive list of comorbidities and significant PMHx impacting functional performance. Upon initial evaluation, pt appears to be performing below baseline functional status. Pt requires MODA for UB ADLs, MAXA for LB ADLs, MODA for bed mobility, MODA for transfers and MAXA x 2 for stand pivot transfer from recliner chair to the EOB.The AM-PAC & Barthel Index outcome tools were used to assist in determining pt safety w/self care /mobility and appropriate d/c recommendations, see above for score. Occupational performance is affected by the following deficits:  decreased muscular strength , acute change in mobility status , decreased range of motion , motor planning, decreased motor control , decreased balance , decreased standing tolerance for self care tasks , decreased dynamic balance impacting functional reach, decreased functional use of BUE's , decreased functional reach , decreased trunk control , decreased activity tolerance , impaired judgement and problem solving , impaired safety awareness , impulsive behavior, and decreased use of R UE impacting bilateral manipulation . Personal/Environmental factors impacting D/C include: (+) Hx of falls , steps to enter/navigate the home, FOS to second floor, Assistance needed for ADL/IADLs, Assistance needed for ADLs and functional  mobility, High fall risk , and decreased recall of precautions . Supporting factors include: able to maintain FFSU, support system available, and attitude towards recovery . Patient would benefit from OT services within the acute care setting to maximize level of functional independence in the following areas fall prevention , self-care transfers, bed mobility , functional mobility, formal cognitive evaluation, and ADLs.  From OT standpoint, recommendation at time of D/C would be Level I (Maximum Resource Intensity) .   Goals   Patient Goals to regain independence   LTG Time Frame 10-14   Long Term Goal See below   Plan   Treatment Interventions ADL retraining;Functional transfer training;UE strengthening/ROM;Endurance training;Cognitive reorientation;Patient/family training;Equipment evaluation/education;Neuromuscular reeducation;Fine motor coordination activities;Compensatory technique education;Continued evaluation;Energy conservation;Activityengagement   Goal Expiration Date 10/25/24   OT Treatment Day 0   OT Frequency 3-5x/wk   Discharge Recommendation   Rehab Resource Intensity Level, OT I (Maximum Resource Intensity)   Equipment Recommended Bedside commode   Commode Type Wide   AM-PAC Daily Activity Inpatient   Lower Body Dressing 2   Bathing 2   Toileting 2   Upper Body Dressing 2   Grooming 2   Eating 3   Daily Activity Raw Score 13   Daily Activity Standardized Score (Calc for Raw Score >=11) 32.03   AM-PAC Applied Cognition Inpatient   Following a Speech/Presentation 4   Understanding Ordinary Conversation 4   Taking Medications 4   Remembering Where Things Are Placed or Put Away 4   Remembering List of 4-5 Errands 4   Taking Care of Complicated Tasks 3   Applied Cognition Raw Score 23   Applied Cognition Standardized Score 53.08   Barthel Index   Feeding 5   Bathing 0   Grooming Score 0   Dressing Score 5   Bladder Score 10   Bowels Score 10   Toilet Use Score 5   Transfers (Bed/Chair) Score 5   Mobility  (Level Surface) Score 0   Stairs Score 0   Barthel Index Score 40   Modified Russell Scale   Modified Russell Scale 4   End of Consult   Education Provided Yes   Patient Position at End of Consult Supine;Bed/Chair alarm activated;All needs within reach   Nurse Communication Nurse aware of consult     GOALS:      -Patient will perform grooming tasks sitting at sink vs EOB with overall ASHLEY in order to increase overall independence     -Patient will be ASHLEY with UB dressing using AE and AD as needed in order to increase (I) with ADLs     -Patient will be ASHLEY with UB bathing using AE and AD as needed in order to increase (I) with ADLs     -Patient will be MODA with LB dressing with use of AE and AD as needed in order to increase (I) with ADLs     -Patient will be MODA with LB bathing with use of AE and AD as needed in order to increase (I) with ADLs     -Patient will complete toileting w/ MODA w/ G hygiene/thoroughness in order to reduce caregiver burden     -Patient will demonstrate ASHLEY x 1 with bed mobility for ability to manage own comfort and initiate OOB tasks.      -Patient will perform functional transfers with ASHLEY x 1 to/from all surfaces using DME as needed in order to increase (I) with functional tasks     -Patient will be MODA x 1 with functional mobility to/from bathroom for increased independence with toileting tasks     -Patient will tolerate therapeutic activities for greater than 30 min, in order to increase tolerance for functional activities.      -Patient will engage in ongoing cognitive assessment in order to assist with safe discharge planning/recommendations.     -Patient will independently integrate one pacing strategy into morning ADL's.     -Patient will demonstrate standing for 3 min in order to increase active participation in functional activities      The patient's raw score on the -PAC Daily Activity Inpatient Short Form is 13. A raw score of less than 19 suggests the patient may  benefit from discharge to post-acute rehabilitation services. Please refer to the recommendation of the Occupational Therapist for safe discharge planning.    Jessica Cisneros MS OTR/L   [place] Licensure# [id number]

## 2024-10-15 NOTE — ASSESSMENT & PLAN NOTE
Patient presented with right upper and right lower extremity weakness with associated right facial droop, highly suggestive of CVA  Initial NIH 10, LKW 2200  CT/CTA head unremarkable for acute process  TnK administered following correction of hypertension with nicardipine drip  Following thrombolytic administration patient developed worsening slurred speech and headache, had repeat CT head which was unremarkable  Neurology consult  q30min neuro checks for 6 hours, then q1h for 16 hours  STAT CT Head if any neuro changes  Maintain SBP<180, DBP<105, currently on nicardipine drip  Plan for echo, MRI

## 2024-10-15 NOTE — SPEECH THERAPY NOTE
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      Recommendations:   Diet: puree/level 1 diet and nectar thick liquids   Meds: whole with liquid and whole with puree   Frequent Oral care: 2x/day  Aspiration precautions- watch for pocketing on R, cue for lingual sweeps.   Other Recommendations/ considerations: will cont to follow for diet tolerance, strategy training and assess for diet upgrade.        Current Medical Status  Pt is a 59 y.o. male who presented to [facility]  with CVA.   Patient stated that he was in his usual state of health until 10/14 at 2200 when he suddenly noticed right upper and lower extremity weakness while at rest at home. He stated that this was associated with mild blurry vision. He did not notice any other symptoms at that time.  He endorsed headache.  He denies any fevers, chills, or other systemic symptoms.  Denied chest pain, shortness of breath, or palpitations.  Denied syncope.  While en route to ED, prehospital stroke alert was called and patient went to CT/CTA head and neck which was grossly unremarkable for acute process.  Initial NIH score was 10.  He was noted to have right  upper and right lower extremity weakness as well as right facial droop.  The patient's only contraindication to thrombolytic therapy at this time was hypertension and this was controlled with nicardipine infusion.  Following this, patient was consented and TNK thrombolytic therapy was administered.  Shortly after thrombolytic administration, the patient developed some slurred speech as well as worsening of his headache and went for repeat CT head which was unremarkable for acute process.     Past medical history:   Please see H&P for details    Special Studies:  MRI: pending  CT-head: 10/15/24 No acute intracranial abnormality.     Social/Education/Vocational Hx:  Pt lives  w/ sig other     Swallow Information   Prior speech/swallowing tx: none   Current Risks for Dysphagia & Aspiration: CVA and failed nsg screen, facial droop and slurred speech   Current Symptoms/Concerns:  facial droop and slurred speech   Current Diet: NPO   Baseline Diet: regular diet and thin liquids  Takes pills- whole w/ water     Baseline Assessment   Behavior/Cognition: alert  Speech/Language Status: able to participate in conversation and able to follow commands; dysarthric   Patient Positioning: upright in bed     Swallow Mechanism Exam   Facial: right facial droop  Labial: decreased ROM right side  Lingual: right sided tongue deviation and decreased ROM  Velum: unable to visualize  Mandible: adequate ROM  Dentition: adequate  Vocal quality:clear/adequate   Volitional Cough: strong/productive   Respiratory: RA    Consistencies Assessed and Performance   Consistencies Administered: thin liquids, nectar thick, puree, and soft solids    Oral Stage: pt took large bites of toast, slow mastication, holding/pocketing on R. Cued for lingual sweeps to clear pocketing of soft/solids. Pt took large sips of NTL and thin liquids by cup, NTL also by straw. Suspected decreased oral control w/ thin liquids > NTL.     Pharyngeal Stage: swallow initiation  appeared timely with complete laryngeal excursion palpated. Audible swallows noted but no coughing, throat clearing noted. Pt denied food dysphagia symptoms.       Esophageal Concerns: none reported      Results Reviewed with: patient and RN   Dysphagia Goals: pt will tolerate dysphagia 1 puree with nectar thick liquids without s/s of aspiration x2-3 sessions   Discharge recommendation:speech and swallowing tx needed post discharge     Speech Therapy Prognosis   Prognosis: good and fair    Prognosis Considerations: age, medical status, prior medical history, cognitive status, and therapeutic potential      Mya Watson MA CCC-SLP  Speech Pathologist  [place] license # [id number]  [place] license # [id number]  Available via Secure Chat

## 2024-10-15 NOTE — ED ATTENDING ATTESTATION
10/14/2024  I, Frandy Junior MD, saw and evaluated the patient. I have discussed the patient with the resident/non-physician practitioner and agree with the resident's/non-physician practitioner's findings, Plan of Care, and MDM as documented in the resident's/non-physician practitioner's note, except where noted. All available labs and Radiology studies were reviewed.  I was present for key portions of any procedure(s) performed by the resident/non-physician practitioner and I was immediately available to provide assistance.       At this point I agree with the current assessment done in the Emergency Department.  I have conducted an independent evaluation of this patient a history and physical is as follows:    This is a 59-year-old male patient with a relevant past medical history of diabetes, presenting to the ED today for complaint of strokelike symptoms.  His symptoms started approximately 1 hour prior to arrival.  He states that he has had right upper as well as right lower extremity flaccid weakness.  Patient denies any slurred speech, facial droop, sensory deficits, or other motor deficits.  His NIH stroke scale is 8.  Patient otherwise denies any significantly related symptoms.  His differential diagnosis includes: Ischemic stroke versus hemorrhagic stroke versus diabetic neuropathy versus other.  Patient was significantly hypertensive, received labetalol here in the ED, was started on nicardipine drip.  Due to his deficits, and the onset of his symptoms, after CT showed no evidence of acute bleed patient was offered TNK, and he did not have any contraindications aside from his blood pressure.  Once nicardipine was started, patient's blood pressure improved, and he was administered TNK.  Patient hospitalized onto the critical care service.    ED Course         Critical Care Time  CriticalCare Time    Date/Time: 10/14/2024 11:18 PM    Performed by: Frandy Junior MD  Authorized by: Frandy Kim  MD [place]    Critical care provider statement:     Critical care time (minutes):  60    Critical care time was exclusive of:  Separately billable procedures and treating other patients and teaching time    Critical care was necessary to treat or prevent imminent or life-threatening deterioration of the following conditions:  CNS failure or compromise    Critical care was time spent personally by me on the following activities:  Ordering and performing treatments and interventions, ordering and review of laboratory studies, ordering and review of radiographic studies, re-evaluation of patient's condition, review of old charts, development of treatment plan with patient or surrogate, discussions with consultants and discussions with primary provider

## 2024-10-15 NOTE — ED PROVIDER NOTES
Time reflects when diagnosis was documented in both MDM as applicable and the Disposition within this note       Time User Action Codes Description Comment    10/14/2024 10:40 PM Renay Barkermed PRAJAPATI Add [R29.90] Stroke-like symptoms     10/14/2024 11:17 PM Vernon Frandy Add [I10] Hypertension     10/14/2024 11:35 PM Vernon, Amr Add [R53.1] Right sided weakness     10/15/2024  1:09 AM Glenn Santos Add [I63.9] CVA (cerebral vascular accident) (HCC)     10/15/2024  6:45 AM Tamara Perez Add [E11.22,  I12.9,  N18.4] Type 2 diabetes mellitus with stage 4 chronic kidney disease and hypertension (HCC)     10/18/2024  7:58 AM Peyton Saunders Add [R50.9] Fever     10/22/2024 11:37 AM Martine Lay Add [G93.40] Encephalopathy     10/22/2024 11:37 AM Martine Lay Add [G24.01] Tardive dyskinesia     10/22/2024 11:37 AM Martine Lay Add [F31.5] Bipolar I disorder, severe, current or most recent episode depressed, with psychotic features (HCC)     10/26/2024  1:24 PM Laura, Alpiniano B Add [R52] Pain     10/26/2024  1:24 PM Laura, Alpiniano B Add [R12] Heartburn     10/26/2024  1:24 PM Laura, Alpiniano B Add [Z79.899] On deep vein thrombosis (DVT) prophylaxis     10/26/2024  1:24 PM Laura, Alpiniano B Add [K59.00] Constipation           ED Disposition       ED Disposition   Admit    Condition   Stable    Date/Time   Mon Oct 14, 2024 11:35 PM    Comment   --             Assessment & Plan       Medical Decision Making  EMS called prehospital stroke alert.  Last known normal 2200 hrs. when patient was noted to fall with noted right-sided weakness.  Previous history of the same within the month that resolved on its own so did not seek evaluation.  Stroke alert called.  Discussed case with Neurology Dr. Renae Albert, NIH 8, if no contraindications candidate for TNK.  No acute bleed on scan.  No acute large vessel occlusion.  Elevated blood pressures while in the ED.  Given labetalol and started on Cardene  drip.  Once blood pressure was acceptable patient was given TNK after discussion of risk and benefits and was ultimately accepting of medication.  Discussed case with critical care team with plan to admit under critical care.  While in the ED patient had episode of headache and slurred speech as in ED course.  Repeat CT head.  Patient admitted to critical care.    Amount and/or Complexity of Data Reviewed  Labs: ordered.  Radiology: ordered.    Risk  Prescription drug management.  Decision regarding hospitalization.        ED Course as of 10/29/24 1518   Mon Oct 14, 2024   2330 I had thorough discussion of risk and benefits of TNK vs Medical management (ASA, plavix, PT/OT, risk modification)  w/ pt for stroke like sx. I advised that there was a risk of intractable bleeding if given TNK which could lead to worsening sx, abla, permanent disability, and death but had potential to reverse ischemia to effected penumbra resulting in reversal of some sx and possible improved recovery. I also advised a small percentage of people may experience allergic reaction to the medication w/ chance of tongue swelling and anaphylaxis which could result in asphyxiation, permanent disability, and death. After discussion of risk and benefits patient opted for TNK vs medical management.    Tue Oct 15, 2024   0037 Patient had change in status per nursing staff.  On reevaluation patient states that he is having trouble finding words, slurred speech, increase in headache.  No change in deficits or NIH.  Will proceed to CT head to evaluate for bleed.  Cardene drip stopped due to relatedly low pressures as possible contributing source of strokelike symptoms.  Started on Benadryl, Pepcid, Zofran for possible allergic swelling of tongue causing slurred speech.  No cardiorespiratory complaints and lungs clear to auscultation bilaterally.  No rash.  No GI symptoms.  Will continue to evaluate.       Medications   labetalol (NORMODYNE) injection 20  mg (20 mg Intravenous Given 10/14/24 2328)   tenecteplase (TNKase) injection 25 mg (25 mg Intravenous Given 10/14/24 2340)   Famotidine (PF) (PEPCID) injection 20 mg (20 mg Intravenous Given 10/15/24 0051)   ondansetron (ZOFRAN) injection 4 mg (4 mg Intravenous Given 10/15/24 0046)   diphenhydrAMINE (BENADRYL) injection 25 mg (25 mg Intravenous Given 10/15/24 0050)       ED Risk Strat Scores       Stroke Assessment       Row Name 10/14/24 2041             NIH Stroke Scale    Interval Baseline      Level of Consciousness (1a.) 0      LOC Questions (1b.) 0      LOC Commands (1c.) 0      Best Gaze (2.) 0      Visual (3.) 0      Facial Palsy (4.) 1      Motor Arm, Left (5a.) 0      Motor Arm, Right (5b.) 3      Motor Leg, Left (6a.) 0      Motor Leg, Right (6b.) 3      Limb Ataxia (7.) 0      Sensory (8.) 0      Best Language (9.) 0      Dysarthria (10.) 0      Extinction and Inattention (11.) (Formerly Neglect) 1      Total 8                    Flowsheet Row Most Recent Value   Thrombolytic Decision Options    Thrombolytic Decision After a discussion of risks, benefits, alternatives and side effects (including best medical therapy excluding thrombolysis) reviewing inclusion and exclusion criteria the decision was made to proceed with thrombolytic therapy.   Consent obtained from the patient.   Consent obtained by provider: Dr. aBrker                                                History of Present Illness       Chief Complaint   Patient presents with    STROKE Alert       Past Medical History:   Diagnosis Date    ADHD, adult residual type     Anxiety     Anxiety     Bipolar 1 disorder (HCC)     Cataract     Left eye    Chronic kidney disease     Colon polyp     CPAP (continuous positive airway pressure) dependence     Depression     Depression     Diabetes mellitus (HCC)     blood sugar 167 on admission    Equinus deformity of foot     GERD (gastroesophageal reflux disease)     Homicidal ideations     Hyperlipidemia      "Hypertension     Microalbuminuria     Morbid obesity (HCC)     Neuropathy     Shortness of breath     Sleep apnea     CPAP at bedtime    Sleep apnea     Stroke (HCC)     Suicidal intent       Past Surgical History:   Procedure Laterality Date    CATARACT EXTRACTION      CATARACT EXTRACTION      COLONOSCOPY      HAND SURGERY Right     OTHER SURGICAL HISTORY      REPAIR OF SUPERFICIAL WOUND FACE    HI ESOPHAGOGASTRODUODENOSCOPY TRANSORAL DIAGNOSTIC N/A 2018    Procedure: ESOPHAGOGASTRODUODENOSCOPY (EGD);  Surgeon: Yinka Maier MD;  Location: [facility];  Service: Gastroenterology    HI ESOPHAGOGASTRODUODENOSCOPY TRANSORAL DIAGNOSTIC N/A 2018    Procedure: EGD AND COLONOSCOPY;  Surgeon: Yinka Maier MD;  Location: [facility];  Service: Gastroenterology      Family History   Problem Relation Age of Onset    Diabetes Mother     Alcohol abuse Father     Diabetes Father     Hypertension Father     Lung cancer Father     Stroke Father     Cancer Father         lung    Alcohol abuse Sister     Depression Sister     Lymphoma Sister     Alcohol abuse Family     Alcohol abuse Sister     Alcohol abuse Sister     Cancer Sister     Heart disease Neg Hx     Thyroid disease Neg Hx       Social History     Tobacco Use    Smoking status: Former     Current packs/day: 0.00     Types: Cigarettes     Quit date:      Years since quittin.8    Smokeless tobacco: Current     Types: Chew    Tobacco comments:     pt \"Quit after approx over 25 years ago\"   Vaping Use    Vaping status: Never Used   Substance Use Topics    Alcohol use: Not Currently     Comment: rarely    Drug use: Not Currently     Comment: quit 20 years ago      E-Cigarette/Vaping    E-Cigarette Use Never User       E-Cigarette/Vaping Substances    Nicotine No     THC No     CBD No     Flavoring No     Other No     Unknown No       I have reviewed and agree with the history as documented.     59-year-old male presents with strokelike symptoms.  Per EMS was " walking in his home and felt dizzy and fell to the ground and had sudden onset of right-sided weakness of the upper and lower extremity.  Had similar episode 1 month ago that resolved on its own I did not seek out evaluation.  Unsure of his medical history but denies recent surgeries, head surgery, recent stroke, GI bleed, blood thinner use, head injury.  Denies headache, chest pains, shortness of breath, nausea, vomiting, abdominal pains, vision changes.      History limited by:  Acuity of condition  STROKE Alert      Review of Systems   All other systems reviewed and are negative.          Objective       ED Triage Vitals   Temperature Pulse Blood Pressure Respirations SpO2 Patient Position - Orthostatic VS   10/14/24 2340 10/14/24 2310 10/14/24 2246 10/14/24 2340 10/14/24 2246 10/14/24 2345   97.9 °F (36.6 °C) 92 (!) 197/104 22 99 % Sitting      Temp Source Heart Rate Source BP Location FiO2 (%) Pain Score    10/14/24 2340 10/20/24 1500 10/14/24 2345 -- 10/15/24 0145    Oral Monitor Left arm  No Pain      Vitals      Date and Time Temp Pulse SpO2 Resp BP Pain Score FACES Pain Rating User   10/26/24 1351 -- 62 99 % -- 129/73 -- -- DII   10/26/24 1100 -- -- -- -- -- No Pain -- MOMO   10/26/24 0800 -- -- -- -- -- No Pain -- KMS   10/26/24 0725 97.9 °F (36.6 °C) -- -- 18 -- -- -- MQ   10/26/24 0725 -- 68 100 % -- 148/84 -- -- DII   10/26/24 0612 -- 77 98 % -- 155/90 -- -- DII   10/26/24 0612 -- -- -- -- 150/90 -- -- MA   10/25/24 2137 -- 63 99 % -- 126/68 -- -- DII   10/25/24 2000 -- -- -- -- -- No Pain -- MA   10/25/24 1943 98.4 °F (36.9 °C) 66 99 % -- 123/66 -- -- DII   10/25/24 1455 98.7 °F (37.1 °C) 68 99 % -- 132/71 -- -- DII   10/25/24 1455 -- -- -- -- 132/71 -- -- JH   10/25/24 0900 -- -- -- -- -- No Pain -- JH   10/25/24 0836 -- -- -- -- -- No Pain -- MOMO   10/25/24 0725 -- 64 99 % -- 152/81 -- -- DII   10/25/24 0559 -- -- -- -- -- No Pain --    10/24/24 2157 -- -- 99 % -- -- -- -- JK   10/24/24 2051 98.4 °F  (36.9 °C) 67 99 % 16 131/74 -- -- DII   10/24/24 2000 -- -- -- -- -- No Pain -- GW   10/24/24 1749 -- 74 -- -- 153/69 -- -- GW   10/24/24 1554 98.2 °F (36.8 °C) 67 99 % -- 124/72 -- -- DII   10/24/24 0831 -- -- -- -- -- No Pain -- JM   10/24/24 0800 -- -- -- -- -- No Pain -- JH   10/24/24 0739 97.4 °F (36.3 °C) 65 96 % -- 114/68 -- -- DII   10/24/24 0736 -- 72 96 % -- 114/68 -- -- DII   10/24/24 0729 -- -- -- -- -- No Pain -- RR   10/24/24 0440 -- 71 99 % -- 137/87 -- -- DII   10/23/24 2300 -- -- 96 % -- -- -- -- MO   10/23/24 2059 98.1 °F (36.7 °C) 61 97 % -- 124/77 -- -- DII   10/23/24 1945 -- -- -- -- -- No Pain -- NA   10/23/24 1530 -- -- -- 18 -- -- -- SC   10/23/24 1530 98.2 °F (36.8 °C) 71 98 % -- 131/78 -- -- DII   10/23/24 1530 98.2 °F (36.8 °C) 78 97 % -- 131/78 -- -- DII   10/23/24 0800 -- -- -- -- -- No Pain -- JH   10/23/24 0743 98.1 °F (36.7 °C) 78 97 % -- 143/78 -- -- DII   10/23/24 0249 -- 69 100 % -- 155/73 -- -- DII   10/22/24 2329 98.3 °F (36.8 °C) 70 97 % -- 179/93 -- -- DII   10/22/24 2211 98.5 °F (36.9 °C) 70 95 % 17 120/75 -- -- DII   10/22/24 2211 98.5 °F (36.9 °C) 68 82 % 18 120/75 -- -- DII   10/22/24 0800 -- -- -- -- -- No Pain -- CH   10/22/24 0716 98.1 °F (36.7 °C) 85 98 % -- 173/99 -- -- DII   10/22/24 0714 98.1 °F (36.7 °C) 78 99 % -- 188/98 -- -- DII   10/21/24 2108 98.2 °F (36.8 °C) 71 96 % -- 150/88 -- -- DII   10/21/24 2108 -- 71 97 % -- 150/88 -- -- DII   10/21/24 2100 -- -- -- -- -- No Pain -- CC   10/21/24 1523 97.4 °F (36.3 °C) 69 97 % 18 150/94 -- -- DII   10/21/24 1058 -- -- -- -- -- No Pain -- RR   10/21/24 0840 -- -- -- -- -- No Pain -- JM   10/21/24 0800 -- -- -- -- -- No Pain -- CH   10/21/24 0719 98.2 °F (36.8 °C) 64 98 % -- 158/88 -- -- DII   10/20/24 2116 98.4 °F (36.9 °C) 73 98 % 19 113/68 -- -- DII   10/20/24 2100 -- -- -- -- -- No Pain -- CC   10/20/24 1500 97.9 °F (36.6 °C) 63 97 % 20 138/81 -- -- SS   10/20/24 0800 -- -- -- -- -- No Pain -- SS   10/20/24 0746 98.3  °F (36.8 °C) 84 98 % -- 146/80 -- -- DII   10/19/24 2124 -- 77 98 % 20 132/79 -- -- DII   10/19/24 1930 -- -- -- -- -- No Pain -- NA   10/19/24 1534 -- 71 99 % 18 138/84 -- -- DII   10/19/24 1500 -- -- -- -- -- No Pain -- SH   10/19/24 0800 -- -- -- -- -- No Pain -- SS   10/19/24 0732 98.9 °F (37.2 °C) 84 96 % -- 136/66 -- -- DII   10/18/24 2101 -- -- -- 18 -- -- -- SC   10/18/24 2101 -- 85 98 % -- 112/65 -- -- DII   10/18/24 2000 -- -- -- -- -- No Pain -- NA   10/18/24 1945 -- -- -- -- -- No Pain -- NA   10/18/24 1520 -- -- -- 18 -- -- -- SC   10/18/24 1520 98.6 °F (37 °C) 89 96 % -- 118/72 -- -- DII   10/18/24 0800 -- -- -- -- -- No Pain -- JA   10/18/24 0752 99.2 °F (37.3 °C) 89 97 % 20 191/95 -- -- DII   10/17/24 2300 -- -- 97 % -- -- -- -- BD   10/17/24 2046 99.1 °F (37.3 °C) 100 98 % -- 151/82 -- -- DII   10/17/24 1657 99.3 °F (37.4 °C) 99 98 % -- 156/86 -- -- DII   10/17/24 1600 -- -- -- -- -- No Pain -- AV   10/17/24 1551 100.3 °F (37.9 °C) 103 97 % -- 159/84 -- -- DII   10/17/24 1430 -- -- -- -- 200/100 -- -- MM   10/17/24 1422 -- -- -- -- -- Med Not Given for Pain - for MAR use only -- MM   10/17/24 1418 101.3 °F (38.5 °C) 103 99 % -- 175/102 -- -- DII   10/17/24 0927 99.3 °F (37.4 °C) 105 97 % -- 166/95 -- -- DII   10/17/24 0843 -- -- -- -- -- No Pain -- MOMO   10/17/24 0835 -- -- -- -- -- No Pain -- JM   10/17/24 0827 -- 114 97 % -- 150/94 -- -- DII   10/17/24 0816 -- 123 96 % -- 152/108 -- -- DII   10/17/24 0804 -- -- -- -- -- Med Not Given for Pain - for MAR use only -- MM   10/17/24 0756 -- -- -- -- 218/110 -- -- CD   10/17/24 0745 100.3 °F (37.9 °C) -- -- 22 -- -- -- CD   10/17/24 0745 -- 108 96 % -- 188/102 -- -- DII   10/17/24 0400 -- -- 99 % -- -- -- -- ND   10/16/24 2351 -- -- 100 % -- -- -- -- ND   10/16/24 2039 98.4 °F (36.9 °C) 82 98 % -- 146/86 -- -- DII   10/16/24 2000 -- -- -- -- -- No Pain -- AV   10/16/24 1947 -- 84 97 % -- 142/86 -- -- DII   10/16/24 1850 -- -- -- -- 180/110 -- --     10/16/24 1841 98.4 °F (36.9 °C) 89 98 % -- 181/108 -- -- DII   10/16/24 1839 98.4 °F (36.9 °C) 90 98 % 20 182/108 -- -- DII   10/16/24 1724 -- 83 -- 33 188/92 -- -- KF   10/16/24 1500 98 °F (36.7 °C) 96 100 % 48 172/92 -- -- AC   10/16/24 1200 -- 71 100 % 12 158/94 -- -- KF   10/16/24 1100 98 °F (36.7 °C) 75 100 % 20 166/90 -- -- AC   10/16/24 1000 -- 91 99 % -- 157/96 -- -- KF   10/16/24 0923 -- 106 100 % -- 147/98 -- -- KF   10/16/24 0900 -- 89 100 % -- 188/91 -- -- KF   10/16/24 0854 -- 89 -- -- 188/91 -- -- KF   10/16/24 0800 -- -- -- -- -- No Pain -- KF   10/16/24 0700 97.5 °F (36.4 °C) -- -- -- -- -- --    10/16/24 0630 -- 100 98 % 44 185/85 -- --    10/16/24 0612 -- 91 98 % 13 162/85 -- --    10/16/24 0500 -- 88 99 % 17 184/97 -- --    10/16/24 0435 98 °F (36.7 °C) 90 98 % 20 169/90 -- --    10/16/24 0400 -- 82 97 % 17 155/77 No Pain --    10/16/24 0330 -- 83 99 % 15 163/85 -- --    10/16/24 0300 -- 85 99 % 23 149/82 -- --    10/16/24 0200 -- 98 99 % -- 158/80 -- --    10/16/24 0109 -- 85 96 % 12 132/63 -- --    10/16/24 0044 -- 85 97 % 13 171/76 -- -- CJ   10/16/24 0000 -- -- -- -- -- No Pain -- CJ   10/15/24 2321 97.9 °F (36.6 °C) 85 99 % 18 135/82 -- -- CJ   10/15/24 2300 -- 84 98 % 15 142/67 -- --    10/15/24 2000 -- -- 98 % -- -- No Pain -- CJ   10/15/24 1845 -- 80 98 % 12 164/94 -- -- LR   10/15/24 1745 -- 84 100 % 21 176/86 -- -- LR   10/15/24 1645 -- 82 98 % 19 180/94 -- -- LR   10/15/24 1545 -- 80 100 % 17 175/89 -- -- LR   10/15/24 1532 -- -- -- -- -- No Pain -- AS   10/15/24 1530 -- 79 99 % 32 167/94 -- -- LR   10/15/24 1524 98.2 °F (36.8 °C) -- -- -- 167/94 -- -- ALDO   10/15/24 1500 -- 82 99 % 18 180/100 -- -- LR   10/15/24 1430 -- 82 99 % 14 169/90 -- -- LR   10/15/24 1406 -- -- -- -- -- No Pain -- KR   10/15/24 1400 -- 82 99 % 19 158/79 -- -- LR   10/15/24 1330 -- 78 99 % 14 176/96 -- -- LR   10/15/24 1300 -- 83 99 % 27 175/84 -- -- LR   10/15/24 1230 -- 80 99 % -- 155/87  -- -- LR   10/15/24 1200 -- 79 -- 12 162/83 -- -- LR   10/15/24 1145 -- 83 100 % 16 138/69 -- -- LR   10/15/24 1130 -- 83 98 % 19 160/76 -- -- LR   10/15/24 1115 -- 86 99 % 24 162/76 -- -- LR   10/15/24 1100 -- 82 99 % 13 -- -- -- LR   10/15/24 1100 97.9 °F (36.6 °C) -- -- -- 160/76 -- -- MM   10/15/24 1045 -- 84 98 % 18 144/82 -- -- LR   10/15/24 1030 -- 87 98 % 21 143/84 -- -- LR   10/15/24 1015 -- 86 98 % 15 141/72 -- -- LR   10/15/24 1000 -- 87 97 % 13 157/78 -- -- LR   10/15/24 0930 -- 91 96 % 18 165/78 -- -- LR   10/15/24 0900 -- 94 97 % 14 167/77 -- -- LR   10/15/24 0830 -- 96 99 % 18 170/81 -- -- LR   10/15/24 0800 -- 101 98 % 20 166/78 -- -- LR   10/15/24 0730 -- 98 99 % 16 166/79 -- -- LR   10/15/24 0707 97.9 °F (36.6 °C) -- -- -- -- -- -- CD   10/15/24 0700 -- 93 -- 10 176/87 -- -- LR   10/15/24 0630 -- 83 97 % 12 195/92 -- -- SC   10/15/24 0600 -- 88 97 % 13 172/90 -- -- SC   10/15/24 0530 -- 87 98 % 13 175/84 -- -- SC   10/15/24 0500 -- 90 98 % 15 184/92 -- -- SC   10/15/24 0430 -- 85 99 % 15 191/93 -- -- SC   10/15/24 0400 -- 82 99 % 25 189/88 -- -- SC   10/15/24 0335 -- 83 99 % 22 194/100 -- -- SC   10/15/24 0300 -- 84 99 % 17 183/88 -- -- SC   10/15/24 0230 -- 83 99 % 17 167/90 -- -- SC   10/15/24 0215 -- 90 100 % 26 176/86 -- -- SC   10/15/24 0145 -- -- -- -- 179/86 No Pain -- SC   10/15/24 0130 -- 88 100 % 22 166/80 -- -- MD   10/15/24 0115 -- 86 98 % 22 181/86 -- -- MD   10/15/24 0101 -- 92 100 % 22 167/83 -- -- MD   10/15/24 0045 -- 87 98 % -- 155/67 -- -- MD   10/15/24 0040 -- 90 98 % -- 151/73 -- -- MD   10/15/24 0035 -- 92 92 % -- 147/76 -- -- MD   10/15/24 0030 -- 92 97 % 22 143/71 -- -- MD   10/15/24 0015 -- 91 99 % 22 147/71 -- -- MD   10/14/24 2345 -- 86 100 % 22 172/84 -- -- MD   10/14/24 2340 97.9 °F (36.6 °C) 90 100 % 22 179/85 -- -- MD   10/14/24 2315 -- 90 100 % -- 197/92 -- -- MD   10/14/24 2310 -- 92 100 % -- 231/105 -- -- MD   10/14/24 2246 -- -- 99 % -- 197/104 -- -- MD             Physical Exam  Vitals and nursing note reviewed.   Constitutional:       General: He is in acute distress.      Appearance: Normal appearance. He is obese. He is ill-appearing. He is not toxic-appearing or diaphoretic.   HENT:      Head: Normocephalic and atraumatic.      Right Ear: External ear normal.      Left Ear: External ear normal.      Nose: Nose normal.      Mouth/Throat:      Mouth: Mucous membranes are moist.      Pharynx: Oropharynx is clear.   Eyes:      General: No scleral icterus.        Right eye: No discharge.         Left eye: No discharge.      Extraocular Movements: Extraocular movements intact.      Conjunctiva/sclera: Conjunctivae normal.      Pupils: Pupils are equal, round, and reactive to light.   Cardiovascular:      Rate and Rhythm: Normal rate and regular rhythm.      Pulses: Normal pulses.      Heart sounds: Normal heart sounds. No murmur heard.  Pulmonary:      Effort: Pulmonary effort is normal. No respiratory distress.      Breath sounds: Normal breath sounds. No stridor. No wheezing, rhonchi or rales.   Chest:      Chest wall: No tenderness.   Abdominal:      General: There is no distension.      Palpations: Abdomen is soft. There is no mass.      Tenderness: There is no abdominal tenderness. There is no guarding or rebound.      Hernia: No hernia is present.   Musculoskeletal:         General: No swelling, tenderness, deformity or signs of injury. Normal range of motion.      Cervical back: Normal range of motion and neck supple. No rigidity or tenderness.      Right lower leg: Edema present.      Left lower leg: Edema present.   Skin:     General: Skin is warm and dry.      Capillary Refill: Capillary refill takes less than 2 seconds.      Coloration: Skin is not cyanotic, jaundiced or pale.      Findings: No bruising, erythema, lesion, petechiae or rash.   Neurological:      General: No focal deficit present.      Mental Status: He is alert and oriented to person, place, and  time. Mental status is at baseline.      Comments: -Face asymmetrical w/ right facial droop.  -Tongue midline. Normal speech.  -Cranial nerves II-XII intact except noted facial droop  -Pronator drift on right side  - strength weak on the right side  -Elbow flexor/extensor weak on right side  -Sensation to light touch intact over distal arm bilaterally  -Finger to Nose testing normal bilaterally on the left and unable to test on the right due to profound weakness.  -Hip flexor strength weak on the right side  -Knee flexor/extensor strength weak on the right side  -Heel flexor/extensor strength weak on the right side  -Sensation to light touch intact over lower extremities bilaterally  -Heel to shin testing normal on the left side and unable to perform on the right due to profound weakness             Results Reviewed       Procedure Component Value Units Date/Time    HS Troponin I 4hr [[id number]]  (Normal) Collected: 10/15/24 0335    Lab Status: Final result Specimen: Blood from Arm, Right Updated: 10/15/24 0409     hs TnI 4hr 20 ng/L      Delta 4hr hsTnI 4 ng/L     HS Troponin I 2hr [[id number]]  (Normal) Collected: 10/15/24 0111    Lab Status: Final result Specimen: Blood from Arm, Right Updated: 10/15/24 0148     hs TnI 2hr 17 ng/L      Delta 2hr hsTnI 1 ng/L     HS Troponin 0hr (reflex protocol) [[id number]]  (Normal) Collected: 10/14/24 2311    Lab Status: Final result Specimen: Blood from Arm, Left Updated: 10/14/24 2357     hs TnI 0hr 16 ng/L     Protime-INR [[id number]]  (Normal) Collected: 10/14/24 2311    Lab Status: Final result Specimen: Blood from Arm, Left Updated: 10/14/24 2357     Protime 13.9 seconds      INR 1.00    Narrative:      INR Therapeutic Range    Indication                                             INR Range      Atrial Fibrillation                                               2.0-3.0  Hypercoagulable State                                    2.0.2.3  Left Ventricular Asist Device                             2.0-3.0  Mechanical Heart Valve                                  -    Aortic(with afib, MI, embolism, HF, LA enlargement,    and/or coagulopathy)                                     2.0-3.0 (2.5-3.5)     Mitral                                                             2.5-3.5  Prosthetic/Bioprosthetic Heart Valve               2.0-3.0  Venous thromboembolism (VTE: VT, PE        2.0-3.0    APTT [[id number]]  (Normal) Collected: 10/14/24 2311    Lab Status: Final result Specimen: Blood from Arm, Left Updated: 10/14/24 2357     PTT 26 seconds     Basic metabolic panel [[id number]]  (Abnormal) Collected: 10/14/24 2312    Lab Status: Final result Specimen: Blood from Arm, Left Updated: 10/14/24 2351     Sodium 134 mmol/L      Potassium 4.2 mmol/L      Chloride 104 mmol/L      CO2 22 mmol/L      ANION GAP 8 mmol/L      BUN 29 mg/dL      Creatinine 3.24 mg/dL      Glucose 171 mg/dL      Calcium 8.2 mg/dL      eGFR 19 ml/min/1.73sq m     Narrative:      [employer] guidelines for Chronic Kidney Disease (CKD):     Stage 1 with normal or high GFR (GFR > 90 mL/min/1.73 square meters)    Stage 2 Mild CKD (GFR = 60-89 mL/min/1.73 square meters)    Stage 3A Moderate CKD (GFR = 45-59 mL/min/1.73 square meters)    Stage 3B Moderate CKD (GFR = 30-44 mL/min/1.73 square meters)    Stage 4 Severe CKD (GFR = 15-29 mL/min/1.73 square meters)    Stage 5 End Stage CKD (GFR <15 mL/min/1.73 square meters)  Note: GFR calculation is accurate only with a steady state creatinine    Hepatic function panel [[id number]]  (Abnormal) Collected: 10/14/24 2312    Lab Status: Final result Specimen: Blood from Arm, Left Updated: 10/14/24 2351     Total Bilirubin 0.26 mg/dL      Bilirubin, Direct 0.04 mg/dL      Alkaline Phosphatase 77 U/L      AST 13 U/L      ALT 10 U/L      Total Protein 5.9 g/dL      Albumin 2.9 g/dL     CBC and Platelet [[id number]]  (Abnormal) Collected: 10/14/24 2311    Lab Status: Final  result Specimen: Blood from Arm, Left Updated: 10/14/24 2333     WBC 8.76 Thousand/uL      RBC 4.63 Million/uL      Hemoglobin 12.1 g/dL      Hematocrit 37.1 %      MCV 80 fL      MCH 26.1 pg      MCHC 32.6 g/dL      RDW 14.6 %      Platelets 212 Thousands/uL      MPV 9.9 fL     Fingerstick Glucose (POCT) [[id number]]  (Abnormal) Collected: 10/14/24 2244    Lab Status: Final result Specimen: Blood Updated: 10/14/24 2245     POC Glucose 160 mg/dl             MRI brain wo contrast   Final Interpretation by Saqib Jenkins MD (10/24 1119)      Grossly stable recent infarct demonstrated involving the left frontal periventricular/corona radiata white matter with associated cytotoxic edema but no associated hemorrhage. Mild developing wallerian degeneration involving the left corticospinal tract.      No new acute/recent infarct identified. Stable additional mild chronic white matter microangiopathic changes involving both cerebral hemispheres.      The study was marked in EPIC for immediate notification.      Workstation performed: [id number]         CT head wo contrast   Final Interpretation by Too Dempsey MD (10/24 0527)      No acute intracranial hemorrhage. Periventricular moderate microangiopathic change. Sequelae of old infarcts are again seen. If clinical concern for acute ischemia, consider more sensitive MRI brain for better evaluation.                  Workstation performed: [id number]         CT head wo contrast   Final Interpretation by Igor Real MD (10/22 6959)      No significant change from recent CT head and MRI brain.                  Workstation performed: [id number]         CT chest abdomen pelvis wo contrast   Final Interpretation by Bradley Landon Kocher, MD (10/20 5936)      No acute findings in the chest, abdomen, or pelvis to explain the patient's fevers.      Increased density within the gallbladder likely vicarious excretion of contrast in this patient with recent previous  contrast administration.         Resident: MARTI SANCHEZ I, the attending radiologist, have reviewed the images and agree with the final report above.      Workstation performed: [id number]         US kidney and bladder   Final Interpretation by Don Zavaleta MD (10/19 0945)      No hydronephrosis.      Mild diffuse bladder wall thickening should be correlated with urinalysis.            Workstation performed: [id number]         XR chest portable   Final Interpretation by Manuel Norton MD (10/18 2756)      No acute cardiopulmonary disease.            Workstation performed: [id number]         CT head wo contrast   Final Interpretation by Donnell Gomes MD (10/17 0957)      No acute intracranial abnormality.  Chronic microangiopathic changes.   Left corona radiata infarction as previously characterized.               Workstation performed: [id number]         MRI brain wo contrast   Final Interpretation by Kelsey Mosley MD (10/16 0823)      Acute left corona radiata infarct.      Other nonemergent findings above.         Workstation performed: [id number]         CT head wo contrast   Final Interpretation by Leslie Smalls MD (10/16 0523)      No acute intracranial hemorrhage. No significant interval change. Consider MRI with diffusion weighted imaging for further evaluation, if there are no contraindications.                  Workstation performed: [id number]         CT head without contrast   Final Interpretation by Tommy Gary MD (10/15 0121)      No acute intracranial abnormality.                  Workstation performed: [id number]         CT stroke alert brain   Final Interpretation by Emir Marley DO (10/14 0439)      No acute intracranial abnormality.  Stable chronic microangiopathic changes within the brain.         Findings were directly discussed with Renae Albert at 11:18 p.m on 10/14/2024.      Workstation performed: [id number]         CTA stroke alert (head/neck)   Final  "Interpretation by Emir Marley DO (10/14 9661)      Unremarkable CTA neck and brain.               Findings were directly discussed with Renae Albert at 11:18 p.m on 10/14/2024.      Workstation performed: [id number]             Procedures    ED Medication and Procedure Management   Prior to Admission Medications   Prescriptions Last Dose Informant Patient Reported? Taking?   Blood Glucose Monitoring Suppl (FREESTYLE LITE) ANTONIA  Self No No   Sig: by Does not apply route 3 (three) times a day   Blood Glucose Monitoring Suppl (FreeStyle Lite) w/Device KIT  Self No No   Sig: USE AS INSTRUCTED 4 TIMES A DAY   Continuous Blood Gluc  (FreeStyle Clarence 14 Day [place]) ANTONIA  Self No No   Sig: Use  to check BG at least 4 times a day   Continuous Blood Gluc Sensor (FreeStyle Clarence 14 Day Sensor) MISC  Self No No   Sig: Apply sensor every 14 days to check BG at least 4 times a day   Patient not taking: Reported on 4/24/2024   FREESTYLE LITE test strip  Self No No   Sig: CHECK BLOOD SUGARS FOUR TIMES DAILY   INSULIN SYRINGE .5CC/29G 29G X 1/2\" 0.5 ML MISC  Self Yes No   Sig: Use   Patient not taking: Reported on 7/19/2024   Insulin Pen Needle 31G X 8 MM MISC  Self No No   Sig: Check sugars three times a day   Lancets (FREESTYLE) lancets  Self No No   Sig: USE THREE TIMES DAILY AS DIRECTED   OLANZapine (ZyPREXA) 10 mg tablet  Self No No   Sig: Take 1 tablet (10 mg total) by mouth daily at bedtime   Valbenazine Tosylate (Ingrezza) 40 MG CAPS  Self No No   Sig: Take 40 mg by mouth daily at bedtime   amLODIPine (NORVASC) 5 mg tablet   No No   Sig: Take 2 tablets (10 mg total) by mouth daily   ammonium lactate (LAC-HYDRIN) 12 % cream  Self No No   Sig: Apply topically as needed for dry skin   atorvastatin (LIPITOR) 20 mg tablet   No No   Sig: Take 1 tablet (20 mg total) by mouth daily   buPROPion (WELLBUTRIN) 100 mg tablet  Self No No   Sig: Take 1 tablet (100 mg total) by mouth 2 (two) times a day Take bid--(once " in the AM and once at 12 noon)   busPIRone (BUSPAR) 15 mg tablet  Self No No   Sig: Take 1/2 - 1 tab po qd-bid   clonazePAM (KlonoPIN) 1 mg tablet  Self No No   Sig: TAKE 1 TABLET BY MOUTH EVERY DAY AS NEEDED FOR ANXIETY OR PANIC ATTACKS   ergocalciferol (ERGOCALCIFEROL) 1.25 MG (71962 UT) capsule   No No   Sig: Take 1 capsule (50,000 Units total) by mouth once a week   glucose monitoring kit (FREESTYLE) monitoring kit  Self No No   Sig: Use 1 each 4 (four) times a day Fine to substitute other brand if not covered by insurance   Patient not taking: Reported on 7/19/2024   labetalol (NORMODYNE) 100 mg tablet   No No   Sig: Take 1 tablet (100 mg total) by mouth 2 (two) times a day   omeprazole (PriLOSEC) 20 mg delayed release capsule  Self No No   Sig: Take 1 capsule (20 mg total) by mouth daily   omeprazole (PriLOSEC) 40 MG capsule   No No   Sig: Take 1 capsule (40 mg total) by mouth daily   pantoprazole (PROTONIX) 40 mg tablet   No No   Sig: Take 1 tablet (40 mg total) by mouth daily   semaglutide, 0.25 or 0.5 mg/dose, (Ozempic, 0.25 or 0.5 MG/DOSE,) 2 mg/3 mL injection pen  Self No No   Sig: Inject 0.75 mL (0.5 mg total) under the skin every 7 days   traZODone (DESYREL) 50 mg tablet  Self No No   Sig: Take 1/2 to 1 tab po qhs prn insomnia   vitamin B-12 (CYANOCOBALAMIN) 500 MCG TABS  Self No No   Sig: Take 1 tablet (500 mcg total) by mouth daily      Facility-Administered Medications: None     Discharge Medication List as of 10/26/2024  4:10 PM        START taking these medications    Details   acetaminophen (TYLENOL) 325 mg tablet Take 2 tablets (650 mg total) by mouth every 6 (six) hours as needed for mild pain, fever or headaches, Starting Sat 10/26/2024, No Print      aspirin (ECOTRIN LOW STRENGTH) 81 mg EC tablet Take 1 tablet (81 mg total) by mouth daily, Starting Sun 10/27/2024, No Print      calcium carbonate (TUMS) 500 mg chewable tablet Chew 2 tablets (1,000 mg total) 2 (two) times a day as needed for  indigestion or heartburn, Starting Sat 10/26/2024, No Print      clopidogrel (PLAVIX) 75 mg tablet Take 1 tablet (75 mg total) by mouth daily for 10 doses, Starting Sun 10/27/2024, Until Wed 11/6/2024, No Print      Heparin Sodium, Porcine, (heparin, porcine,) 5,000 units/mL Inject 1.5 mL (7,500 Units total) under the skin every 8 (eight) hours (May continue on DVT prophylaxis if patient not not mobile yet in the rehab facility; consider to discontinue if improving mobility)., Starting Sat 10/26/2024, No Print      hydrALAZINE (APRESOLINE) 25 mg tablet Take 1 tablet (25 mg total) by mouth every 8 (eight) hours, Starting Sat 10/26/2024, No Print      insulin lispro (HumALOG/ADMELOG) 100 units/mL injection Inject 2-12 Units under the skin 3 (three) times a day before meals, Starting Sat 10/26/2024, No Print      NIFEdipine (PROCARDIA XL) 30 mg 24 hr tablet Take 3 tablets (90 mg total) by mouth daily, Starting Sun 10/27/2024, No Print      polyethylene glycol (MIRALAX) 17 g packet Take 17 g by mouth daily as needed (Constipation), Starting Sat 10/26/2024, No Print           CONTINUE these medications which have NOT CHANGED    Details   ammonium lactate (LAC-HYDRIN) 12 % cream Apply topically as needed for dry skin, Starting Wed 9/25/2024, Normal      atorvastatin (LIPITOR) 20 mg tablet Take 1 tablet (20 mg total) by mouth daily, Starting Tue 10/8/2024, Normal      Blood Glucose Monitoring Suppl (FREESTYLE LITE) ANTONIA by Does not apply route 3 (three) times a day, Starting Fri 3/22/2019, Normal      Blood Glucose Monitoring Suppl (FreeStyle Lite) w/Device KIT USE AS INSTRUCTED 4 TIMES A DAY, Normal      buPROPion (WELLBUTRIN) 100 mg tablet Take 1 tablet (100 mg total) by mouth 2 (two) times a day Take bid--(once in the AM and once at 12 noon), Starting Wed 8/21/2024, Normal      Continuous Blood Gluc  (FreeStyle Clarence 14 Day [place]) ANTONIA Use  to check BG at least 4 times a day, Normal      Continuous  "Blood Gluc Sensor (FreeStyle Clarence 14 Day Sensor) MISC Apply sensor every 14 days to check BG at least 4 times a day, Normal      ergocalciferol (ERGOCALCIFEROL) 1.25 MG (10313 UT) capsule Take 1 capsule (50,000 Units total) by mouth once a week, Starting Tue 10/8/2024, Normal      FREESTYLE LITE test strip CHECK BLOOD SUGARS FOUR TIMES DAILY, Normal      glucose monitoring kit (FREESTYLE) monitoring kit Use 1 each 4 (four) times a day Fine to substitute other brand if not covered by insurance, Starting Tue 1/18/2022, Normal      Insulin Pen Needle 31G X 8 MM MISC Check sugars three times a day, Normal      INSULIN SYRINGE .5CC/29G 29G X 1/2\" 0.5 ML MISC Use, Starting Tue 11/26/2013, Historical Med      labetalol (NORMODYNE) 100 mg tablet Take 1 tablet (100 mg total) by mouth 2 (two) times a day, Starting Tue 10/8/2024, Until Mon 1/6/2025, Normal      Lancets (FREESTYLE) lancets USE THREE TIMES DAILY AS DIRECTED, Normal      OLANZapine (ZyPREXA) 10 mg tablet Take 1 tablet (10 mg total) by mouth daily at bedtime, Starting Wed 8/21/2024, Normal      pantoprazole (PROTONIX) 40 mg tablet Take 1 tablet (40 mg total) by mouth daily, Starting Mon 10/21/2024, Normal      traZODone (DESYREL) 50 mg tablet Take 1/2 to 1 tab po qhs prn insomnia, Normal      Valbenazine Tosylate (Ingrezza) 40 MG CAPS Take 40 mg by mouth daily at bedtime, Starting Wed 8/21/2024, Normal      vitamin B-12 (CYANOCOBALAMIN) 500 MCG TABS Take 1 tablet (500 mcg total) by mouth daily, Starting Mon 4/15/2024, Until Tue 10/8/2024, Normal           STOP taking these medications       amLODIPine (NORVASC) 5 mg tablet Comments:   Reason for Stopping:         busPIRone (BUSPAR) 15 mg tablet Comments:   Reason for Stopping:         clonazePAM (KlonoPIN) 1 mg tablet Comments:   Reason for Stopping:         omeprazole (PriLOSEC) 20 mg delayed release capsule Comments:   Reason for Stopping:         omeprazole (PriLOSEC) 40 MG capsule Comments:   Reason for " Stopping:         semaglutide, 0.25 or 0.5 mg/dose, (Ozempic, 0.25 or 0.5 MG/DOSE,) 2 mg/3 mL injection pen Comments:   Reason for Stopping:             Outpatient Discharge Orders   Discharge Diet     Discharge Condtion:  Stabilized     Patient Aware of Diagnosis: Yes     Activity:  As Tolerated     Activity:  Per Rehab Recommendations     ED SEPSIS DOCUMENTATION   Time reflects when diagnosis was documented in both MDM as applicable and the Disposition within this note       Time User Action Codes Description Comment    10/14/2024 10:40 PM Erin Barker Add [R29.90] Stroke-like symptoms     10/14/2024 11:17 PM Frandy Junior Add [I10] Hypertension     10/14/2024 11:35 PM Vernon Amr Add [R53.1] Right sided weakness     10/15/2024  1:09 AM Glenn Santos Add [I63.9] CVA (cerebral vascular accident) ([facility])     10/15/2024  6:45 AM Tamara Perez Add [E11.22,  I12.9,  N18.4] Type 2 diabetes mellitus with stage 4 chronic kidney disease and hypertension ([facility])     10/18/2024  7:58 AM Peyton Saunders Add [R50.9] Fever     10/22/2024 11:37 AM Martine Lay Add [G93.40] Encephalopathy     10/22/2024 11:37 AM Martine Lay Add [G24.01] Tardive dyskinesia     10/22/2024 11:37 AM Martine Lay Add [F31.5] Bipolar I disorder, severe, current or most recent episode depressed, with psychotic features ([facility])     10/26/2024  1:24 PM Laura, Alpiniano B Add [R52] Pain     10/26/2024  1:24 PM Laura, Alpiniano B Add [R12] Heartburn     10/26/2024  1:24 PM Laura, Alpiniano B Add [Z79.899] On deep vein thrombosis (DVT) prophylaxis     10/26/2024  1:24 PM Laura, Alpiniano B Add [K59.00] Constipation                  Erin Barker MD  10/15/24 0110       Erin Barker MD  10/29/24 1515

## 2024-10-15 NOTE — TELEPHONE ENCOUNTER
Patient's spouse called in to cancel his 10/18 RD/SW appt with Ron due to being hospitalized.  Please call him to reschedule.

## 2024-10-15 NOTE — ASSESSMENT & PLAN NOTE
Lab Results   Component Value Date    HGBA1C 8.2 (A) 09/25/2024       Recent Labs     10/14/24  0753 10/14/24  2244   POCGLU 162* 160*       Blood Sugar Average: Last 72 hrs:  (P) 160    -Currently maintained on semaglutide monotherapy  -Average blood sugar last 72 hours 160, no intervention at this time

## 2024-10-15 NOTE — PLAN OF CARE
Rec dysphagia 1 puree w/ nectar thick liquids  Watch for pocketing on R  Aspiration precautions  Meds in applesauce or w/ NTL

## 2024-10-15 NOTE — TELEPHONE ENCOUNTER
Patient hospitalized at this time, appointment with SW has been cancelled.    SW will be reaching out to patient to discuss surgical program.

## 2024-10-15 NOTE — PLAN OF CARE
Problem: OCCUPATIONAL THERAPY ADULT  Goal: Performs self-care activities at highest level of function for planned discharge setting.  See evaluation for individualized goals.  Description: Treatment Interventions: ADL retraining, Functional transfer training, UE strengthening/ROM, Endurance training, Cognitive reorientation, Patient/family training, Equipment evaluation/education, Neuromuscular reeducation, Fine motor coordination activities, Compensatory technique education, Continued evaluation, Energy conservation, Activityengagement  Equipment Recommended: Bedside commode       See flowsheet documentation for full assessment, interventions and recommendations.   Note: Limitation: Decreased ADL status, Decreased UE ROM, Decreased UE strength, Decreased Safe judgement during ADL, Decreased fine motor control, Decreased self-care trans, Decreased high-level ADLs (R estefania-paresis, motor planning, safety awareness, balance, activity tolerance)  Prognosis: Fair  Assessment: Patient is a 59 y.o. male seen for OT evaluation following admission on 10/14/2024  s/p CVA (cerebral vascular accident) (HCC). Please see above for comprehensive list of comorbidities and significant PMHx impacting functional performance. Upon initial evaluation, pt appears to be performing below baseline functional status. Pt requires MODA for UB ADLs, MAXA for LB ADLs, MODA for bed mobility, MODA for transfers and MAXA x 2 for stand pivot transfer from recliner chair to the EOB.The AM-PAC & Barthel Index outcome tools were used to assist in determining pt safety w/self care /mobility and appropriate d/c recommendations, see above for score. Occupational performance is affected by the following deficits:  decreased muscular strength , acute change in mobility status , decreased range of motion , motor planning, decreased motor control , decreased balance , decreased standing tolerance for self care tasks , decreased dynamic balance impacting  functional reach, decreased functional use of BUE's , decreased functional reach , decreased trunk control , decreased activity tolerance , impaired judgement and problem solving , impaired safety awareness , impulsive behavior, and decreased use of R UE impacting bilateral manipulation . Personal/Environmental factors impacting D/C include: (+) Hx of falls , steps to enter/navigate the home, FOS to second floor, Assistance needed for ADL/IADLs, Assistance needed for ADLs and functional mobility, High fall risk , and decreased recall of precautions . Supporting factors include: able to maintain FFSU, support system available, and attitude towards recovery . Patient would benefit from OT services within the acute care setting to maximize level of functional independence in the following areas fall prevention , self-care transfers, bed mobility , functional mobility, formal cognitive evaluation, and ADLs.  From OT standpoint, recommendation at time of D/C would be Level I (Maximum Resource Intensity) .     Rehab Resource Intensity Level, OT: I (Maximum Resource Intensity)     Jessica Cisneros MS OTCARYN/L   [place] Licensure# [id number]

## 2024-10-15 NOTE — PLAN OF CARE
Problem: Neurological Deficit  Goal: Neurological status is stable or improving  Description: Interventions:  - Monitor and assess patient's level of consciousness, motor function, sensory function, and level of assistance needed for ADLs.   - Monitor and report changes from baseline. Collaborate with interdisciplinary team to initiate plan and implement interventions as ordered.   - Provide and maintain a safe environment.  - Consider seizure precautions.  - Consider fall precautions.  - Consider aspiration precautions.  - Consider bleeding precautions.  Outcome: Progressing     Problem: Activity Intolerance/Impaired Mobility  Goal: Mobility/activity is maintained at optimum level for patient  Description: Interventions:  - Assess and monitor patient  barriers to mobility and need for assistive/adaptive devices.  - Assess patient's emotional response to limitations.  - Collaborate with interdisciplinary team and initiate plans and interventions as ordered.  - Encourage independent activity per ability.  - Maintain proper body alignment.  - Perform active/passive rom as tolerated/ordered.  - Plan activities to conserve energy.  - Turn patient as appropriate  Outcome: Progressing     Problem: Communication Impairment  Goal: Ability to express needs and understand communication  Description: Assess patient's communication skills and ability to understand information.  Patient will demonstrate use of effective communication techniques, alternative methods of communication and understanding even if not able to speak.     - Encourage communication and provide alternate methods of communication as needed.  - Collaborate with case management/ for discharge needs.  - Include patient/family/caregiver in decisions related to communication.  Outcome: Progressing       Problem: Nutrition  Goal: Nutrition/Hydration status is improving  Description: Monitor and assess patient's nutrition/hydration status for  malnutrition (ex- brittle hair, bruises, dry skin, pale skin and conjunctiva, muscle wasting, smooth red tongue, and disorientation). Collaborate with interdisciplinary team and initiate plan and interventions as ordered.  Monitor patient's weight and dietary intake as ordered or per policy. Utilize nutrition screening tool and intervene per policy. Determine patient's food preferences and provide high-protein, high-caloric foods as appropriate.     - Assist patient with eating.  - Allow adequate time for meals.  - Encourage patient to take dietary supplement as ordered.  - Collaborate with clinical nutritionist.  - Include patient/family/caregiver in decisions related to nutrition.  Outcome: Progressing

## 2024-10-15 NOTE — STROKE DOCUMENTATION
Family at beside. Family updated by provider.   Please call patient back. Pt wants to schedule appt with Dr. Arreaga for the recheck. Before scheduling she wants to know what code will be used for appt. Wants to make sure it will be covered by insurance.

## 2024-10-15 NOTE — ASSESSMENT & PLAN NOTE
Lab Results   Component Value Date    EGFR 19 10/14/2024    EGFR 21 10/07/2024    EGFR 20 (L) 10/07/2024    CREATININE 3.24 (H) 10/14/2024    CREATININE 3.07 (H) 10/07/2024    CREATININE 2.89 (H) 07/19/2024     -Slight increase in Cr since last admission, continue monitoring

## 2024-10-15 NOTE — ASSESSMENT & PLAN NOTE
Lab Results   Component Value Date    EGFR 21 10/15/2024    EGFR 19 10/14/2024    EGFR 21 10/07/2024    EGFR 20 (L) 10/07/2024    CREATININE 3.08 (H) 10/15/2024    CREATININE 3.24 (H) 10/14/2024    CREATININE 3.07 (H) 10/07/2024

## 2024-10-15 NOTE — CONSULTS
NEUROLOGY RESIDENCY CONSULT NOTE     Name: Tommie Taylor   Age & Sex: 59 y.o. male   MRN: [id number]  Unit/Bed#: ICU 01   Encounter: [id number]  Length of Stay: 0    Will need outpatient follow up with neurovascular attending in 6-8 weeks (60 min visit). No further outpatient neurodiagnostic testing needed at this time.    Pending for discharge: MRI brain, TTE, repeat CTH 24h post-TNK    ASSESSMENT & PLAN     Assessment & Plan  CVA (cerebral vascular accident) (HCC)  Tommie is a pleasant 60YO M with history of HTN, T2DM, HLD who presented as a stroke alert on 10/14 at 2242. LKN 2200. He was in his normal state of health going to the bathroom when he suddenly experienced dizziness and collapsed. Also reported mild blurry vision and headache. While trying to get up, he noted being unable to move his RUE/RLE. NIHSS 8 for RUE/RLE weakness, R facial weakness, and extinction. Initial . CTH/CTA H/N negative for acute intracranial abnormalities, LVO, or severe vascular stenosis. TNK administration delayed to tx HTN. TNK given on 10/14 at 2340.    Of note, following TNK administration, pt noted to have worsening dysarthria and headache. Repeat CTH showed no acute intracranial pathology including no acute hemorrhage.    Not on AP/AC at baseline. On Lipitor 20 at baseline.    Vascular risk factors: HTN, T2DM (A1c 8.2%), HLD (last LDL 84), former smoker (quit in 1985), BMI 47    Lab Results   Component Value Date    CHOLESTEROL 149 10/15/2024    TRIG 149 10/15/2024    HDL 42 10/15/2024    LDLCALC 77 10/15/2024     Lab Results   Component Value Date    HGBA1C 8.2 (A) 09/25/2024     Lab Results   Component Value Date    PSF8HYAHULKG 4.847 (H) 10/07/2024    FREET4 0.68 10/07/2024     Impression: Suspect L-sided CVA, possibly MCA territory. No significant atherosclerosis on CTA H/N - possibly small vessel disease vs cardioembolic stroke.    Plan:  Post-TNK and stroke protocol  Repeat CTH on 10/15 around 2300  Pending  MRI brain  No AP/AC including DVT chem ppx for first 24h of TNK  Continue Atorvastatin 40mg daily  BP goal <180/105 for first 24h after TNK administration - currently on Cardene gtt.  Normoglycemia (glucose <180), normothermia  Check LDL (goal <70)  A1c goal <7%  Pending TTE  Pending UDS  Monitor on telemetry for cardiac arrhythmia  PT/OT/ST/PMR/CM/Nutrition eval appreciated  Rest of care per primary team appreciated  Will need outpatient follow up with neurovascular attending in 6-8 weeks (60 min visit). No further outpatient neurodiagnostic testing needed at this time.  Will need outpatient cardiac monitoring (Zio patch or holter monitor) - will need cardiology referral     SUBJECTIVE     Reason for Consult / Principal Problem: R-sided weakness, dysarthria  Hx and PE limited by: none    HPI: Tommie Taylor is a 58YO R-handed M with history of bipolar disorder, HTN, T2DM, HLD who presented as a stroke alert on 10/14 at 2242. LKN 2200. He states he was in his normal state of health walking around at home when he experienced sudden dizziness and collapsed. Also reported mild blurry vision and headache. While trying to get up, he was unable to move his RUE/RLE. NIHSS 8 for RUE/RLE weakness, R facial weakness, and extinction. Initial .    CTH showed no acute intracranial abnormalities. CTA H/N showed no evidence of LVO or severe vascular stenosis. TNK administration delayed by treatment of HTN. TNK given on 10/14 at 2340. Admitted on stroke pathway, post-TNK protocol. Following TNK administration, pt was noted to have worsening slurred speech and headache; repeat CTH personally reviewed - no acute intracranial abnormality including no hemorrhage. Currently on Cardene gtt @ 5.    Not on AP/AC at baseline. On Lipitor 20 at baseline.    Vascular risk factors: HTN, T2DM (A1c 8.2%), HLD (last LDL 84), former smoker (quit in 1985), BMI 47    Lab Results   Component Value Date    CHOLESTEROL 168 04/19/2024    TRIG 75  04/19/2024    HDL 69 04/19/2024    LDLCALC 84 04/19/2024     Lab Results   Component Value Date    HGBA1C 8.2 (A) 09/25/2024     Lab Results   Component Value Date    CZW6DYFFXMMP 4.847 (H) 10/07/2024    FREET4 0.68 10/07/2024     As of this morning, pt reports improvement of RLE strength and resolution of blurry vision and headache. Dysarthria and RUE weakness persistent. No prior known hx of strokes.     Inpatient consult to Neurology  Consult performed by: Kaykay Ch DO  Consult ordered by: Frandy Junior MD        Historical Information   Past Medical History:   Diagnosis Date    ADHD, adult residual type     Anxiety     Anxiety     Bipolar 1 disorder (HCC)     Cataract     Left eye    Chronic kidney disease     Colon polyp     CPAP (continuous positive airway pressure) dependence     Depression     Depression     Diabetes mellitus (HCC)     blood sugar 167 on admission    Equinus deformity of foot     GERD (gastroesophageal reflux disease)     Homicidal ideations     Hyperlipidemia     Hypertension     Microalbuminuria     Morbid obesity (HCC)     Neuropathy     Shortness of breath     Sleep apnea     CPAP at bedtime    Sleep apnea     Stroke (HCC)     Suicidal intent      Past Surgical History:   Procedure Laterality Date    CATARACT EXTRACTION      CATARACT EXTRACTION      COLONOSCOPY      HAND SURGERY Right     OTHER SURGICAL HISTORY      REPAIR OF SUPERFICIAL WOUND FACE    OH ESOPHAGOGASTRODUODENOSCOPY TRANSORAL DIAGNOSTIC N/A 06/14/2018    Procedure: ESOPHAGOGASTRODUODENOSCOPY (EGD);  Surgeon: Yinka Maier MD;  Location: [facility];  Service: Gastroenterology    OH ESOPHAGOGASTRODUODENOSCOPY TRANSORAL DIAGNOSTIC N/A 09/12/2018    Procedure: EGD AND COLONOSCOPY;  Surgeon: Yinka Maier MD;  Location: [facility];  Service: Gastroenterology     Social History   Social History     Substance and Sexual Activity   Alcohol Use Not Currently    Comment: rarely     Social History     Substance and Sexual  "Activity   Drug Use Not Currently    Comment: quit 20 years ago     E-Cigarette/Vaping    E-Cigarette Use Never User      E-Cigarette/Vaping Substances    Nicotine No     THC No     CBD No     Flavoring No     Other No     Unknown No      Social History     Tobacco Use   Smoking Status Former    Current packs/day: 0.00    Types: Cigarettes    Quit date:     Years since quittin.8   Smokeless Tobacco Current    Types: Chew   Tobacco Comments    pt \"Quit after approx over 25 years ago\"     Family History:   Family History   Problem Relation Age of Onset    Diabetes Mother     Alcohol abuse Father     Diabetes Father     Hypertension Father     Lung cancer Father     Stroke Father     Cancer Father         lung    Alcohol abuse Sister     Depression Sister     Lymphoma Sister     Alcohol abuse Family     Alcohol abuse Sister     Alcohol abuse Sister     Cancer Sister     Heart disease Neg Hx     Thyroid disease Neg Hx      Meds/Allergies   all current active meds have been reviewed, current meds:   Current Facility-Administered Medications:     amLODIPine (NORVASC) tablet 10 mg, Daily    atorvastatin (LIPITOR) tablet 40 mg, QPM    buPROPion (WELLBUTRIN) tablet 100 mg, BID    chlorhexidine (PERIDEX) 0.12 % oral rinse 15 mL, Q12H MIGEL    insulin lispro (HumALOG/ADMELOG) 100 units/mL subcutaneous injection 2-12 Units, Q6H MIGEL **AND** Fingerstick Glucose (POCT), Q6H    labetalol (NORMODYNE) tablet 100 mg, BID    niCARdipine (CARDENE) 25 mg (STANDARD CONCENTRATION) in sodium chloride 0.9% 250 mL, Titrated, Last Rate: Stopped (10/15/24 1145)    OLANZapine (ZyPREXA) tablet 10 mg, HS    pantoprazole (PROTONIX) EC tablet 40 mg, Early Morning    polyethylene glycol (MIRALAX) packet 17 g, Daily PRN    traZODone (DESYREL) tablet 50 mg, HS PRN    Valbenazine Tosylate CAPS 40 mg, HS, and PTA meds:   Prior to Admission Medications   Prescriptions Last Dose Informant Patient Reported? Taking?   Blood Glucose Monitoring Suppl " "(FREESTYLE LITE) ANTONIA  Self No No   Sig: by Does not apply route 3 (three) times a day   Blood Glucose Monitoring Suppl (FreeStyle Lite) w/Device KIT  Self No No   Sig: USE AS INSTRUCTED 4 TIMES A DAY   Continuous Blood Gluc  (FreeStyle Clarence 14 Day [place]) ANTONIA  Self No No   Sig: Use  to check BG at least 4 times a day   Continuous Blood Gluc Sensor (FreeStyle [place] 14 Day Sensor) MISC  Self No No   Sig: Apply sensor every 14 days to check BG at least 4 times a day   Patient not taking: Reported on 4/24/2024   FREESTYLE LITE test strip  Self No No   Sig: CHECK BLOOD SUGARS FOUR TIMES DAILY   INSULIN SYRINGE .5CC/29G 29G X 1/2\" 0.5 ML MISC  Self Yes No   Sig: Use   Patient not taking: Reported on 7/19/2024   Insulin Pen Needle 31G X 8 MM MISC  Self No No   Sig: Check sugars three times a day   Patient not taking: Reported on 7/19/2024   Lancets (FREESTYLE) lancets  Self No No   Sig: USE THREE TIMES DAILY AS DIRECTED   Patient not taking: Reported on 7/19/2024   OLANZapine (ZyPREXA) 10 mg tablet  Self No No   Sig: Take 1 tablet (10 mg total) by mouth daily at bedtime   Valbenazine Tosylate (Ingrezza) 40 MG CAPS  Self No No   Sig: Take 40 mg by mouth daily at bedtime   amLODIPine (NORVASC) 5 mg tablet   No No   Sig: Take 2 tablets (10 mg total) by mouth daily   ammonium lactate (LAC-HYDRIN) 12 % cream  Self No No   Sig: Apply topically as needed for dry skin   atorvastatin (LIPITOR) 20 mg tablet   No No   Sig: Take 1 tablet (20 mg total) by mouth daily   buPROPion (WELLBUTRIN) 100 mg tablet  Self No No   Sig: Take 1 tablet (100 mg total) by mouth 2 (two) times a day Take bid--(once in the AM and once at 12 noon)   busPIRone (BUSPAR) 15 mg tablet  Self No No   Sig: Take 1/2 - 1 tab po qd-bid   clonazePAM (KlonoPIN) 1 mg tablet  Self No No   Sig: TAKE 1 TABLET BY MOUTH EVERY DAY AS NEEDED FOR ANXIETY OR PANIC ATTACKS   ergocalciferol (ERGOCALCIFEROL) 1.25 MG (96322 UT) capsule   No No   Sig: Take 1 " capsule (50,000 Units total) by mouth once a week   glucose monitoring kit (FREESTYLE) monitoring kit  Self No No   Sig: Use 1 each 4 (four) times a day Fine to substitute other brand if not covered by insurance   Patient not taking: Reported on 7/19/2024   labetalol (NORMODYNE) 100 mg tablet   No No   Sig: Take 1 tablet (100 mg total) by mouth 2 (two) times a day   omeprazole (PriLOSEC) 20 mg delayed release capsule  Self No No   Sig: Take 1 capsule (20 mg total) by mouth daily   omeprazole (PriLOSEC) 40 MG capsule   No No   Sig: Take 1 capsule (40 mg total) by mouth daily   semaglutide, 0.25 or 0.5 mg/dose, (Ozempic, 0.25 or 0.5 MG/DOSE,) 2 mg/3 mL injection pen  Self No No   Sig: Inject 0.75 mL (0.5 mg total) under the skin every 7 days   traZODone (DESYREL) 50 mg tablet  Self No No   Sig: Take 1/2 to 1 tab po qhs prn insomnia   vitamin B-12 (CYANOCOBALAMIN) 500 MCG TABS  Self No No   Sig: Take 1 tablet (500 mcg total) by mouth daily      Facility-Administered Medications: None     Allergies   Allergen Reactions    Pollen Extract Nasal Congestion    Tetanus Toxoid Swelling       Review of previous medical records was completed.       Review of Systems   Constitutional:  Negative for appetite change, chills, diaphoresis and fever.   Eyes:  Negative for photophobia and visual disturbance.   Respiratory:  Negative for shortness of breath.    Cardiovascular:  Negative for chest pain.   Gastrointestinal:  Negative for abdominal pain.   Neurological:  Positive for facial asymmetry, speech difficulty and weakness. Negative for dizziness, light-headedness, numbness and headaches.   Psychiatric/Behavioral:  Negative for confusion.    All other systems reviewed and are negative.      OBJECTIVE     Vitals:   Vitals:    10/15/24 0530 10/15/24 0600 10/15/24 0630 10/15/24 0707   BP: (!) 175/84 (!) 172/90 (!) 195/92    BP Location:       Pulse: 87 88 83    Resp: 13 13 12    Temp:    97.9 °F (36.6 °C)   TempSrc:    Oral   SpO2:  98% 97% 97%    Weight:       Height:          Body mass index is 47.18 kg/m².     Intake/Output Summary (Last 24 hours) at 10/15/2024 0733  Last data filed at 10/15/2024 0707  Gross per 24 hour   Intake 120.83 ml   Output 750 ml   Net -629.17 ml       Temperature:   Temp (24hrs), Av.5 °F (36.4 °C), Min:96.8 °F (36 °C), Max:97.9 °F (36.6 °C)    Temperature: 97.9 °F (36.6 °C)    Invasive Devices:   Invasive Devices       Peripheral Intravenous Line  Duration             Peripheral IV 10/14/24 Left Antecubital <1 day    Peripheral IV 10/14/24 Right Antecubital <1 day                    Physical Exam  Vitals and nursing note reviewed.   Constitutional:       General: He is not in acute distress.     Appearance: He is well-developed. He is not ill-appearing or toxic-appearing.   HENT:      Head: Normocephalic and atraumatic.      Mouth/Throat:      Mouth: Mucous membranes are moist.   Eyes:      General:         Right eye: No discharge.         Left eye: No discharge.      Extraocular Movements: Extraocular movements intact.      Conjunctiva/sclera: Conjunctivae normal.      Pupils: Pupils are equal, round, and reactive to light.   Cardiovascular:      Rate and Rhythm: Normal rate and regular rhythm.      Comments: Telemetry: NSR, no cardiac arrhythmia  Pulmonary:      Effort: Pulmonary effort is normal. No respiratory distress.   Musculoskeletal:         General: No swelling.      Cervical back: Normal range of motion.      Right lower leg: No edema.      Left lower leg: No edema.   Skin:     General: Skin is warm and dry.   Neurological:      Mental Status: He is alert.      Comments: AAOx4. Pleasant, cooperative, appropriately interactive.   No aphasia noted. Mod/severe dysarthria.  Speech fluent, spontaneous.  PERRLA/EOMI, pupils 1mm equal symmetric reactive. No pathologic nystagmus on primary or endgaze. VF intact in all 4 temporal quadrants. CN2-12 intact, with exception of significant R lower facial weakness  at rest and with activation.  Normal muscle tone.  LUE/LLE 5/5 strength proximally and distally - deltoids, biceps, triceps,  strength, hip flex, knee flex/ext, ankle dorsi/plantarflexion.  RUE 0/5 strength proximally and distally.  RLE 3-/5 hip flexor, 5/5 elsewhere - hip ext, knee flex/ext, ankle dorsi/plantarflexion.  Sensation intact and symmetric in all 4 ext to fine touch.  Reflexes absent in R biceps, R brachioradialis, B/L patellars. 2+ reflexes in L biceps and brachioradialis.  L FNF and L HTS intact without ataxia/dysmetria.  No estefania-attention.  Toes downgoing  No clonus.      Psychiatric:         Mood and Affect: Mood normal.         Behavior: Behavior normal.         Thought Content: Thought content normal.         Judgment: Judgment normal.        LABORATORY DATA     Labs:   Results from last 7 days   Lab Units 10/15/24  0553 10/14/24  2311   WBC Thousand/uL 8.06 8.76   HEMOGLOBIN g/dL 10.9* 12.1   HEMATOCRIT % 32.1* 37.1   PLATELETS Thousands/uL 168 212   SEGS PCT % 65  --    MONO PCT % 9  --    EOS PCT % 3  --       Results from last 7 days   Lab Units 10/15/24  0553 10/14/24  2312   POTASSIUM mmol/L 4.1 4.2   CHLORIDE mmol/L 106 104   CO2 mmol/L 22 22   BUN mg/dL 32* 29*   CREATININE mg/dL 3.08* 3.24*   CALCIUM mg/dL 8.2* 8.2*   ALK PHOS U/L  --  77   ALT U/L  --  10   AST U/L  --  13     Results from last 7 days   Lab Units 10/15/24  0553   MAGNESIUM mg/dL 1.6*     Results from last 7 days   Lab Units 10/15/24  0553   PHOSPHORUS mg/dL 4.2      Results from last 7 days   Lab Units 10/14/24  2311   INR  1.00   PTT seconds 26               IMAGING & DIAGNOSTIC TESTING     Radiology Results: Results Review Statement: I personally reviewed the following image studies in PACS and associated radiology reports: CT head and CT A H/N. My interpretation of the radiology images/reports is: agree with radiologist.  CT head without contrast   Final Result by Tommy Gary MD (10/15 0121)      No acute  intracranial abnormality.                  Workstation performed: [id number]         CT stroke alert brain   Final Result by Emir Marley DO (10/14 2324)      No acute intracranial abnormality.  Stable chronic microangiopathic changes within the brain.         Findings were directly discussed with Renae Albert at 11:18 p.m on 10/14/2024.      Workstation performed: [id number]         CTA stroke alert (head/neck)   Final Result by Emir Marley DO (10/14 2321)      Unremarkable CTA neck and brain.               Findings were directly discussed with Renae Albert at 11:18 p.m on 10/14/2024.      Workstation performed: [id number]         CT head wo contrast    (Results Pending)   MRI inpatient order    (Results Pending)       Other Diagnostic Testing: Telemetry - NSR    ACTIVE MEDICATIONS       Current Facility-Administered Medications:     [Held by provider] amLODIPine (NORVASC) tablet 10 mg, Daily    atorvastatin (LIPITOR) tablet 40 mg, QPM    buPROPion (WELLBUTRIN) tablet 100 mg, BID    chlorhexidine (PERIDEX) 0.12 % oral rinse 15 mL, Q12H MIGEL    insulin lispro (HumALOG/ADMELOG) 100 units/mL subcutaneous injection 2-12 Units, Q6H MIGEL **AND** Fingerstick Glucose (POCT), Q6H    [Held by provider] labetalol (NORMODYNE) tablet 100 mg, BID    magnesium sulfate 2 g/50 mL IVPB (premix) 2 g, Once    niCARdipine (CARDENE) 25 mg (STANDARD CONCENTRATION) in sodium chloride 0.9% 250 mL, Titrated, Last Rate: 5 mg/hr (10/15/24 2323)    OLANZapine (ZyPREXA) tablet 10 mg, HS    pantoprazole (PROTONIX) EC tablet 40 mg, Early Morning    polyethylene glycol (MIRALAX) packet 17 g, Daily PRN    traZODone (DESYREL) tablet 50 mg, HS PRN    Valbenazine Tosylate CAPS 40 mg, HS    Prior to Admission medications    Medication Sig Start Date End Date Taking? Authorizing Provider   amLODIPine (NORVASC) 5 mg tablet Take 2 tablets (10 mg total) by mouth daily 10/8/24   Steven No MD   ammonium lactate (LAC-HYDRIN) 12 % cream Apply topically  "as needed for dry skin 9/25/24   Layton Flores MD   atorvastatin (LIPITOR) 20 mg tablet Take 1 tablet (20 mg total) by mouth daily 10/8/24   Steven No MD   Blood Glucose Monitoring Suppl (FREESTYLE LITE) ANTONIA by Does not apply route 3 (three) times a day 3/22/19   Perla Fraire PA-C   Blood Glucose Monitoring Suppl (FreeStyle Lite) w/Device KIT USE AS INSTRUCTED 4 TIMES A DAY 1/5/23   Katherine Bo PA-C   buPROPion (WELLBUTRIN) 100 mg tablet Take 1 tablet (100 mg total) by mouth 2 (two) times a day Take bid--(once in the AM and once at 12 noon) 8/21/24   Anel Dowd PA-C   busPIRone (BUSPAR) 15 mg tablet Take 1/2 - 1 tab po qd-bid 8/21/24   Anel Dowd PA-C   clonazePAM (KlonoPIN) 1 mg tablet TAKE 1 TABLET BY MOUTH EVERY DAY AS NEEDED FOR ANXIETY OR PANIC ATTACKS 6/12/24   Anel Dowd PA-C   Continuous Blood Gluc  (FreeStyle [place] 14 Day [place]) ANTONIA Use  to check BG at least 4 times a day 2/8/22   Katherine Bo PA-C   Continuous Blood Gluc Sensor (FreeStyle [place] 14 Day Sensor) MISC Apply sensor every 14 days to check BG at least 4 times a day  Patient not taking: Reported on 4/24/2024 2/8/22   Katherine Bo PA-C   ergocalciferol (ERGOCALCIFEROL) 1.25 MG (02838 UT) capsule Take 1 capsule (50,000 Units total) by mouth once a week 10/8/24   Steven No MD   FREESTYLE LITE test strip CHECK BLOOD SUGARS FOUR TIMES DAILY 6/8/21   Katherine Bo PA-C   glucose monitoring kit (FREESTYLE) monitoring kit Use 1 each 4 (four) times a day Fine to substitute other brand if not covered by insurance  Patient not taking: Reported on 7/19/2024 1/18/22   Fracisco Barraza MD   Insulin Pen Needle 31G X 8 MM MISC Check sugars three times a day  Patient not taking: Reported on 7/19/2024 5/11/22   Jun Santizo MD   INSULIN SYRINGE .5CC/29G 29G X 1/2\" 0.5 ML MISC Use  Patient not taking: Reported on 7/19/2024 11/26/13   Historical Provider, MD   labetalol (NORMODYNE) 100 mg tablet Take " 1 tablet (100 mg total) by mouth 2 (two) times a day 10/8/24 1/6/25  Steven No MD   Lancets (FREESTYLE) lancets USE THREE TIMES DAILY AS DIRECTED  Patient not taking: Reported on 7/19/2024 2/24/20   Perla Fraire PA-C   OLANZapine (ZyPREXA) 10 mg tablet Take 1 tablet (10 mg total) by mouth daily at bedtime 8/21/24   Anel Dowd PA-C   omeprazole (PriLOSEC) 20 mg delayed release capsule Take 1 capsule (20 mg total) by mouth daily 9/25/24   Layton Flores MD   omeprazole (PriLOSEC) 40 MG capsule Take 1 capsule (40 mg total) by mouth daily 10/14/24 1/12/25  Ailyn Steen MD   semaglutide, 0.25 or 0.5 mg/dose, (Ozempic, 0.25 or 0.5 MG/DOSE,) 2 mg/3 mL injection pen Inject 0.75 mL (0.5 mg total) under the skin every 7 days 9/25/24 11/24/24  Layton Flores MD   traZODone (DESYREL) 50 mg tablet Take 1/2 to 1 tab po qhs prn insomnia 8/21/24   Anel Dowd PA-C   Valbenazine Tosylate (Ingrezza) 40 MG CAPS Take 40 mg by mouth daily at bedtime 8/21/24   Anel Dowd PA-C   vitamin B-12 (CYANOCOBALAMIN) 500 MCG TABS Take 1 tablet (500 mcg total) by mouth daily 4/15/24 10/8/24  Jessenia Alba MD       CODE STATUS & ADVANCED DIRECTIVES     Code Status: Level 1 - Full Code  Advance Directive and Living Will:      Power of :    POLST:      VTE Pharmacologic Prophylaxis: No DVT chem ppx for first 24 hours post-TNK  VTE Mechanical Prophylaxis: sequential compression device    ======    I have discussed the patient's history, physical exam findings, assessment, and plan in detail with attending, Dr. Cabral    Thank you for allowing me to participate in the care of your patient, Tommie Taylor.    Kaykay Ch, DO  [facility] Neurology Residency, PGY-3

## 2024-10-15 NOTE — ASSESSMENT & PLAN NOTE
Lab Results   Component Value Date    HGBA1C 8.2 (A) 09/25/2024       Recent Labs     10/14/24  0753 10/14/24  2244   POCGLU 162* 160*       Blood Sugar Average: Last 72 hrs:  (P) 160

## 2024-10-15 NOTE — PLAN OF CARE
Problem: PHYSICAL THERAPY ADULT  Goal: Performs mobility at highest level of function for planned discharge setting.  See evaluation for individualized goals.  Description: Treatment/Interventions: Functional transfer training, LE strengthening/ROM, Elevations, Therapeutic exercise, Endurance training, Patient/family training, Equipment eval/education, Bed mobility, Gait training, Compensatory technique education, Spoke to nursing, Spoke to case management    Equipment Recommended: Other (Comment) (TBD pending progress)     See flowsheet documentation for full assessment, interventions and recommendations.  Outcome: Progressing  Note: Prognosis: Good  Problem List: Decreased strength, Decreased range of motion, Decreased endurance, Impaired balance, Decreased mobility, Decreased coordination, Obesity  Assessment: Pt seen for PT evaluation for mobility assessment & discharge needs. Activity orders: OOB to chair. Pt admitted 10/14/2024 w/ dizziness and fall, R sided weakness, dysarthria, headache, hypertension, dx CVA (cerebral vascular accident) (HCC). Comorbidities affecting pt's fnxl performance include: HTN, Bipolar disorder, tardive dyskinesia, DM, CKD. During PT IE, pt requires MODA for trunk management during supine>short sit EOB, variable close S to MODA to maintain upright trunk stability (R sided lean), and MODA for STS transfer from EOB, MODA progressing to ASHLEY to maintain standing. Pt displays above outlined functional impairments & limitations, and presents below his baseline level of functional mobility. The AM-PAC & Barthel Index outcome tools were used to assist in determining pt safety w/ mobility/self care & appropriate d/c recommendations, see above for scores. Pt is at risk of falls d/t multiple comorbidities, h/o falls, impaired balance, acuity of medical illness, ongoing medical treatment of primary dx, and polypharmacy. Pt's clinical presentation is currently unstable/unpredictable as seen in  pt's presentation of varying levels of cognitive performance, increased fall risk, new onset of impairment of functional mobility, decreased endurance, and new onset of weakness. Pt will benefit from continued PT services in order to address impairments, decrease risk of falls, maximize independence w/ fnxl mobility, & ensure safety w/ mobility for transition to next level of care. Based on pt presentation & impairments, pt would most appropriately benefit from Level 1 (maximal PT intensity) resources upon d/c.    Barriers to Discharge: Inaccessible home environment     Rehab Resource Intensity Level, PT: I (Maximum Resource Intensity)    See flowsheet documentation for full assessment.

## 2024-10-15 NOTE — QUICK NOTE
Notified at 10:42 PM. Response immediate.     Pt is a 58 yo M PMHx bipolar disorder, HTN, T2DM, HLD who presented as a stroke alert. LKW 22:00. Pt stated that he was in his normal state of health walking around at home when he experienced sudden dizziness and collapsed. When he was trying to get up, he was unable to move his RUE/RLE. NIHSS upon arrival 8 for RUE/RLE weakness, R facial droop, and extinction. BP elevated on arrival, .     CTH showed no acute intracranial abnormalities. CTA showed no evidence of LVO. As he presented within the window without any obvious contraindications, risks/benefits/alternatives to TNK were discussed and he consented to treatment. TNK administration was delayed by treatment for hypertension. Recommend admission stroke pathway for further workup, post-TNK care goal with BP <180/105 after administration.

## 2024-10-15 NOTE — ASSESSMENT & PLAN NOTE
Tommie is a pleasant 60YO M with history of HTN, T2DM, HLD who presented as a stroke alert on 10/14 at 2242. LKN 2200. He was in his normal state of health going to the bathroom when he suddenly experienced dizziness and collapsed. Also reported mild blurry vision and headache. While trying to get up, he noted being unable to move his RUE/RLE. NIHSS 8 for RUE/RLE weakness, R facial weakness, and extinction. Initial . CTH/CTA H/N negative for acute intracranial abnormalities, LVO, or severe vascular stenosis. TNK administration delayed to tx HTN. TNK given on 10/14 at 2340.    Of note, following TNK administration, pt noted to have worsening dysarthria and headache. Repeat CTH showed no acute intracranial pathology including no acute hemorrhage.    Not on AP/AC at baseline. On Lipitor 20 at baseline.    Vascular risk factors: HTN, T2DM (A1c 8.2%), HLD (last LDL 84), former smoker (quit in 1985), BMI 47    Lab Results   Component Value Date    CHOLESTEROL 149 10/15/2024    TRIG 149 10/15/2024    HDL 42 10/15/2024    LDLCALC 77 10/15/2024     Lab Results   Component Value Date    HGBA1C 8.2 (A) 09/25/2024     Lab Results   Component Value Date    KQQ9NGTGWVHW 4.847 (H) 10/07/2024    FREET4 0.68 10/07/2024     Impression: Suspect L-sided CVA, possibly MCA territory. No significant atherosclerosis on CTA H/N - possibly small vessel disease vs cardioembolic stroke.    Plan:  Post-TNK and stroke protocol  Repeat CTH on 10/15 around 2300  Pending MRI brain  No AP/AC including DVT chem ppx for first 24h of TNK  Continue Atorvastatin 40mg daily  BP goal <180/105 for first 24h after TNK administration - currently on Cardene gtt.  Normoglycemia (glucose <180), normothermia  Check LDL (goal <70)  A1c goal <7%  Pending TTE  Pending UDS  Monitor on telemetry for cardiac arrhythmia  PT/OT/ST/PMR/CM/Nutrition eval appreciated  Rest of care per primary team appreciated  Will need outpatient follow up with neurovascular  attending in 6-8 weeks (60 min visit). No further outpatient neurodiagnostic testing needed at this time.  Will need outpatient cardiac monitoring (Zio patch or holter monitor) - will need cardiology referral

## 2024-10-15 NOTE — CASE MANAGEMENT
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 ([facility]) 10/08/2024     Hypertensive emergency 2024     Vision decreased 2024     Memory deficit 04/15/2024     Tremor 04/15/2024     B12 deficiency 04/15/2024     Tardive dyskinesia 2023     Lightheaded 2023     Loss of appetite 2023     Unintended weight loss 2023     Class 3 severe obesity due to excess calories with serious comorbidity and body mass index (BMI) of 45.0 to 49.9 in adult ([facility]) 2023     Vitamin D deficiency 2021     Hyperlipidemia 2020     Toenail deformity 2019     Persistent proteinuria 2019     Anemia, unspecified 2019     Elevated serum creatinine 2019     DAISY (obstructive sleep apnea)      Generalized anxiety disorder 2018     Hiatal hernia 2018     Lower esophageal ring ([place]) 2018     Insomnia 2018     Bipolar I disorder, severe, current or most recent episode depressed, with psychotic features ([facility]) 2017     Panic attacks 2017     GERD (gastroesophageal reflux disease) 2017     Flat foot 2016     Diabetic polyneuropathy ([facility]) 2014     Type 2 diabetes mellitus with hyperglycemia, without long-term current use of insulin ([facility]) 2014     Allergic rhinitis 2012       LOS (days): 0  Geometric Mean LOS (GMLOS) (days): 3.1  Days to GMLOS:2.6     OBJECTIVE:    Risk of Unplanned  Readmission Score: 24.72         Current admission status: Inpatient       Preferred Pharmacy:   [id number] - [place], [place] - [address]  [address] [address]  Phone: [phone] Fax: [phone]    [id number] - [place], [place] - [address]  [address] [address]  Phone: [phone] Fax: [phone]    [facility] ([facility] #[id number] - [place], [place] - [address]  [address] [address]  Phone: [phone] Fax: [phone]    Primary Care Provider: No primary care provider on file.    Primary Insurance: MEDICARE  Secondary Insurance: [facility]    ASSESSMENT:  Active Health Care Proxies    There are no active Health Care Proxies on file.                 Readmission Root Cause  30 Day Readmission: No    Patient Information  Admitted from:: Home  Mental Status: Other (Comment) (spoke w/ Pts spouse- Marie)  During Assessment patient was accompanied by: Spouse  Assessment information provided by:: Spouse  Primary Caregiver: Self  Support Systems: Self, Spouse/significant other  County of Residence: [place]  What city do you live in?: [place]  Home entry access options. Select all that apply.: Stairs  Number of steps to enter home.: 1  Do the steps have railings?: No  Type of Current Residence: 2 story home  Upon entering residence, is there a bedroom on the main floor (no further steps)?: No  A bedroom is located on the following floor levels of residence (select all that apply):: 2nd Floor  Upon entering residence, is there a bathroom on the main floor (no further steps)?: No  Indicate which floors of current residence have a bathroom (select all the apply):: 2nd Floor  Living Arrangements: Lives w/ Spouse/significant other    Activities of Daily Living Prior to Admission  Functional Status: Independent  Completes ADLs independently?: Yes  Ambulates independently?: Yes  Does  patient use assisted devices?: Yes  Assisted Devices (DME) used: CPAP  Does patient currently own DME?: Yes  What DME does the patient currently own?: CPAP  Does patient have a history of Outpatient Therapy (PT/OT)?: No  Does the patient have a history of Short-Term Rehab?: No  Does patient have a history of HHC?: No  Does patient currently have HHC?: No         Patient Information Continued  Income Source: Unemployed  Does patient have prescription coverage?: Yes  Does patient receive dialysis treatments?: No  Does patient have a history of substance abuse?: No  Does patient have a history of Mental Health Diagnosis?: Yes (Bipolar)  Is patient receiving treatment for mental health?: Yes  Has patient received inpatient treatment related to mental health in the last 2 years?: No         Means of Transportation  Means of Transport to [facility]:: Family transport      Social Determinants of Health (SDOH)      Flowsheet Row Most Recent Value   Housing Stability    In the last 12 months, was there a time when you were not able to pay the mortgage or rent on time? N   In the past 12 months, how many times have you moved where you were living? 0   At any time in the past 12 months, were you homeless or living in a shelter (including now)? N   Transportation Needs    In the past 12 months, has lack of transportation kept you from medical appointments or from getting medications? no   In the past 12 months, has lack of transportation kept you from meetings, work, or from getting things needed for daily living? No   Food Insecurity    Within the past 12 months, you worried that your food would run out before you got the money to buy more. Never true   Within the past 12 months, the food you bought just didn't last and you didn't have money to get more. Never true   Utilities    In the past 12 months has the electric, gas, oil, or water company threatened to shut off services in your home? No            DISCHARGE  DETAILS:    Discharge planning discussed with:: Patient's spouse, Marie via phone  Freedom of Choice: Yes     CM contacted family/caregiver?: Yes (spouse-Marie)    Contacts  Reason/Outcome: Discharge Planning      CM spoke with Pts spouse, Marie. Pt resides with his spouse in a 2 , [address]. PTA, Pt was independent w/ adls.Pt owns/uses a cpap. Pt has not been driving recently, his s/o provides transportation. Pt does not have HHC or STR . Pt is receiving MH services.     CM will follow for any dispo needs.

## 2024-10-15 NOTE — ASSESSMENT & PLAN NOTE
-Currently on nicardipine infusion  -Goal SBP<180, DBP<105 first 24 hours following thromboylytic administration  -Holding home antihypertensives for more precise monitoring and control of hypertension following TnK administration with nicardipine

## 2024-10-15 NOTE — ASSESSMENT & PLAN NOTE
-Hypertensive emergency in the setting of likely CVA  -evidenced by bp 197/104->231/105 POA  -Patient off Cardizem drip as of 10/15  Restarted patient's at home blood pressure medication Norvasc 10 mg and labetalol 100 mg twice daily.  Blood pressures are continuing to be elevated with systolic BP occasionally over 180.  Will consider recommending adjustment and at home blood pressure medications for better hypertensive control

## 2024-10-15 NOTE — H&P
H&P - Critical Care/ICU   Name: Tommie Taylor 59 y.o. male I MRN: [id number]  Unit/Bed#: ICU 01 I Date of Admission: 10/14/2024   Date of Service: 10/15/2024 I Hospital Day: 0       Assessment & Plan  CVA (cerebral vascular accident) ([facility])  Patient presented with right upper and right lower extremity weakness with associated right facial droop, highly suggestive of CVA  Initial NIH 10, LKW 2200  CT/CTA head unremarkable for acute process  TnK administered following correction of hypertension with nicardipine drip  Following thrombolytic administration patient developed worsening slurred speech and headache, had repeat CT head which was unremarkable  Neurology consult  q30min neuro checks for 6 hours, then q1h for 16 hours  STAT CT Head if any neuro changes  Maintain SBP<180, DBP<105, currently on nicardipine drip  Plan for echo, MRI  Primary hypertension  -Currently on nicardipine infusion  -Goal SBP<180, DBP<105 first 24 hours following thromboylytic administration  -Holding home antihypertensives for more precise monitoring and control of hypertension following TnK administration with nicardipine  Bipolar I disorder, severe, current or most recent episode depressed, with psychotic features ([facility])  -History of Bipolar disorder, follow with psych  -Continue Zyprexa, buproprion  GERD (gastroesophageal reflux disease)  -Continue home dose PPI  Hyperlipidemia  -Continue home statin therapy  Tardive dyskinesia  -Continue valbenazine  Type 2 diabetes mellitus with hyperglycemia, without long-term current use of insulin ([facility])  Lab Results   Component Value Date    HGBA1C 8.2 (A) 09/25/2024       Recent Labs     10/14/24  0753 10/14/24  2244   POCGLU 162* 160*       Blood Sugar Average: Last 72 hrs:  (P) 160    -Currently maintained on semaglutide monotherapy  -Average blood sugar last 72 hours 160, no intervention at this time  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 male with hx of Bipolar disorder, T2DM, HTN, HLD who presented to ED for right upper and lower extremity weakness. Patient stated that he was in his usual state of health until 10/14 at 2200 when he suddenly noticed right upper and lower extremity weakness while at rest at home. He stated that this was associated with mild blurry vision. He did not notice any other symptoms at that time.  He endorsed headache.  He denies any fevers, chills, or other systemic symptoms.  Denied chest pain, shortness of breath, or palpitations.  Denied syncope.  While en route to ED, prehospital stroke alert was called and patient went to CT/CTA head and neck which was grossly unremarkable for acute process.  Initial NIH score was 10.  He was noted to have right upper and right lower extremity weakness as well as right facial droop.  The patient's only contraindication to thrombolytic therapy at this time was hypertension and this was controlled with nicardipine infusion.  Following this, patient was consented and TNK thrombolytic therapy was administered.  Shortly after thrombolytic administration, the patient developed some slurred speech as well as worsening of his headache and went for repeat CT head which was unremarkable for acute process. At this time patient was admitted to ICU. He remained hemodynamically stable throughout.  History obtained from chart review and the patient.  Review of Systems: See HPI for Review of Systems    Historical Information   Past Medical History:  No date: ADHD, adult residual type  No date: Anxiety  No date: Anxiety  No date:  Bipolar 1 disorder ([facility])  No date: Cataract      Comment:  Left eye  No date: Chronic kidney disease  No date: Colon polyp  No date: CPAP (continuous positive airway pressure) dependence  No date: Depression  No date: Depression  No date: Diabetes mellitus ([facility])      Comment:  blood sugar 167 on admission  No date: Equinus deformity of foot  No date: GERD (gastroesophageal reflux disease)  No date: Homicidal ideations  No date: Hyperlipidemia  No date: Hypertension  No date: Microalbuminuria  No date: Morbid obesity ([facility])  No date: Neuropathy  No date: Shortness of breath  No date: Sleep apnea      Comment:  CPAP at bedtime  No date: Sleep apnea  No date: Stroke ([facility])  No date: Suicidal intent Past Surgical History:  No date: CATARACT EXTRACTION  No date: CATARACT EXTRACTION  No date: COLONOSCOPY  No date: HAND SURGERY; Right  No date: OTHER SURGICAL HISTORY      Comment:  REPAIR OF SUPERFICIAL WOUND FACE  06/14/2018: [place] ESOPHAGOGASTRODUODENOSCOPY TRANSORAL DIAGNOSTIC; N/A      Comment:  Procedure: ESOPHAGOGASTRODUODENOSCOPY (EGD);  Surgeon:                Yinka Maier MD;  Location: [facility];  Service:                Gastroenterology  09/12/2018: [place] ESOPHAGOGASTRODUODENOSCOPY TRANSORAL DIAGNOSTIC; N/A      Comment:  Procedure: EGD AND COLONOSCOPY;  Surgeon: Yinka Maier MD;  Location: [facility];  Service: Gastroenterology   Current Outpatient Medications   Medication Instructions    amLODIPine (NORVASC) 10 mg, Oral, Daily    ammonium lactate (LAC-HYDRIN) 12 % cream Topical, As needed    atorvastatin (LIPITOR) 20 mg, Oral, Daily    Blood Glucose Monitoring Suppl (FREESTYLE LITE) ANTONIA Does not apply, 3 times daily    Blood Glucose Monitoring Suppl (FreeStyle Lite) w/Device KIT USE AS INSTRUCTED 4 TIMES A DAY    buPROPion (WELLBUTRIN) 100 mg, Oral, 2 times daily, Take bid--(once in the AM and once at 12 noon)    busPIRone (BUSPAR) 15 mg tablet Take 1/2 - 1 tab po qd-bid    clonazePAM (KlonoPIN) 1 mg  "tablet TAKE 1 TABLET BY MOUTH EVERY DAY AS NEEDED FOR ANXIETY OR PANIC ATTACKS    Continuous Blood Gluc  (FreeStyle Clarence 14 Day Golden) ANTONIA Use  to check BG at least 4 times a day    Continuous Blood Gluc Sensor (FreeStyle Clarence 14 Day Sensor) MISC Apply sensor every 14 days to check BG at least 4 times a day    ergocalciferol (ERGOCALCIFEROL) 50,000 Units, Oral, Weekly    FREESTYLE LITE test strip CHECK BLOOD SUGARS FOUR TIMES DAILY    glucose monitoring kit (FREESTYLE) monitoring kit 1 each, Does not apply, 4 times daily, Fine to substitute other brand if not covered by insurance    Ingrezza 40 mg, Oral, Daily at bedtime    Insulin Pen Needle 31G X 8 MM MISC Check sugars three times a day    INSULIN SYRINGE .5CC/29G 29G X 1/2\" 0.5 ML MISC Use    labetalol (NORMODYNE) 100 mg, Oral, 2 times daily    Lancets (FREESTYLE) lancets USE THREE TIMES DAILY AS DIRECTED    OLANZapine (ZYPREXA) 10 mg, Oral, Daily at bedtime    omeprazole (PRILOSEC) 20 mg, Oral, Daily    omeprazole (PRILOSEC) 40 mg, Oral, Daily    semaglutide (0.25 or 0.5 mg/dose) (OZEMPIC (0.25 OR 0.5 MG/DOSE)) 0.5 mg, Subcutaneous, Every 7 days    traZODone (DESYREL) 50 mg tablet Take 1/2 to 1 tab po qhs prn insomnia    vitamin B-12 (CYANOCOBALAMIN) 500 mcg, Oral, Daily    Allergies   Allergen Reactions    Pollen Extract Nasal Congestion    Tetanus Toxoid Swelling      Social History     Tobacco Use    Smoking status: Former     Current packs/day: 0.00     Types: Cigarettes     Quit date:      Years since quittin.8    Smokeless tobacco: Current     Types: Chew    Tobacco comments:     pt \"Quit after approx over 25 years ago\"   Vaping Use    Vaping status: Never Used   Substance Use Topics    Alcohol use: Not Currently     Comment: rarely    Drug use: Not Currently     Comment: quit 20 years ago    Family History   Problem Relation Age of Onset    Diabetes Mother     Alcohol abuse Father     Diabetes Father     Hypertension Father  "    Lung cancer Father     Stroke Father     Cancer Father         lung    Alcohol abuse Sister     Depression Sister     Lymphoma Sister     Alcohol abuse Family     Alcohol abuse Sister     Alcohol abuse Sister     Cancer Sister     Heart disease Neg Hx     Thyroid disease Neg Hx           Objective :                   Vitals I/O      Most Recent Min/Max in 24hrs   Temp 97.9 °F (36.6 °C) Temp  Min: 96.8 °F (36 °C)  Max: 97.9 °F (36.6 °C)   Pulse 83 Pulse  Min: 83  Max: 107   Resp 22 Resp  Min: 16  Max: 26   BP (!) 194/100 BP  Min: 143/71  Max: 231/105   O2 Sat 99 % SpO2  Min: 92 %  Max: 100 %    No intake or output data in the 24 hours ending 10/15/24 0429    Diet NPO; Sips with meds    Invasive Monitoring           Physical Exam   Physical Exam  Vitals and nursing note reviewed.   Eyes:      General: Vision grossly intact.      Extraocular Movements: Extraocular movements intact.      Conjunctiva/sclera: Conjunctivae normal.      Pupils: Pupils are equal, round, and reactive to light.   Skin:     General: Skin is warm and dry.      Capillary Refill: Capillary refill takes less than 2 seconds.   HENT:      Head: Normocephalic and atraumatic.      Right Ear: Hearing and tympanic membrane normal.      Left Ear: Hearing and tympanic membrane normal.      Mouth/Throat:      Mouth: Mucous membranes are moist.   Cardiovascular:      Rate and Rhythm: Normal rate and regular rhythm.      Pulses: Normal pulses.   Abdominal: General: Bowel sounds are normal.      Palpations: Abdomen is soft.      Tenderness: There is no abdominal tenderness.   Constitutional:       Appearance: He is well-developed and well-nourished.   Pulmonary:      Effort: Pulmonary effort is normal.      Breath sounds: Normal breath sounds.   Neurological:      Mental Status: He is alert.      Comments: Fully alert and oriented. Right facial droop, mild. Tongue midline. Normal speech. Cranial nerves II-XII intact except noted facial droop. Pronator drift  on right side.  strength weak on the right side. Elbow flexor/extensor weak on right side. Sensation to light touch intact over distal arm bilaterally. Finger to Nose testing normal bilaterally on the left and unable to test on the right due to profound weakness. Hip flexor strength weak on the right side. Knee flexor/extensor strength weak on the right side. Heel flexor/extensor strength weak on the right side. Sensation to light touch intact over lower extremities bilaterally. Heel to shin testing normal on the left side and unable to perform on the right due to profound weakness            Diagnostic Studies        Lab Results: I have reviewed the following results:     Medications:  Scheduled PRN   [Held by provider] amLODIPine, 10 mg, Daily  atorvastatin, 20 mg, Daily  buPROPion, 100 mg, BID  chlorhexidine, 15 mL, Q12H MIGEL  [Held by provider] labetalol, 100 mg, BID  OLANZapine, 10 mg, HS  pantoprazole, 40 mg, Early Morning  Valbenazine Tosylate, 40 mg, HS      traZODone, 50 mg, HS PRN       Continuous    niCARdipine, 5-15 mg/hr, Last Rate: 5 mg/hr (10/15/24 7683)         Labs:   CBC    Recent Labs     10/14/24  2311   WBC 8.76   HGB 12.1   HCT 37.1        BMP    Recent Labs     10/14/24  2312   SODIUM 134*   K 4.2      CO2 22   AGAP 8   BUN 29*   CREATININE 3.24*   CALCIUM 8.2*       Coags    Recent Labs     10/14/24  2311   INR 1.00   PTT 26        Additional Electrolytes  No recent results       Blood Gas    No recent results  No recent results LFTs  Recent Labs     10/14/24  2312   ALT 10   AST 13   ALKPHOS 77   ALB 2.9*   TBILI 0.26       Infectious  No recent results  Glucose  Recent Labs     10/14/24  2312   GLUC 171*

## 2024-10-15 NOTE — PHYSICAL THERAPY NOTE
PHYSICAL THERAPY EVALUATION & TREATMENT  DATE: 10/15/24  TIME: 8796-0701    NAME:  Tommie Taylor  AGE:   59 y.o.  Mrn:   [id number]  Length Of Stay: 0    ADMIT DX:  Hypertension [I10]  CVA (cerebral vascular accident) (HCC) [I63.9]  Stroke (cerebrum) (HCC) [I63.9]  Right sided weakness [R53.1]  Stroke-like symptoms [R29.90]    Past Medical History:   Diagnosis Date    ADHD, adult residual type     Anxiety     Anxiety     Bipolar 1 disorder (HCC)     Cataract     Left eye    Chronic kidney disease     Colon polyp     CPAP (continuous positive airway pressure) dependence     Depression     Depression     Diabetes mellitus (HCC)     blood sugar 167 on admission    Equinus deformity of foot     GERD (gastroesophageal reflux disease)     Homicidal ideations     Hyperlipidemia     Hypertension     Microalbuminuria     Morbid obesity (HCC)     Neuropathy     Shortness of breath     Sleep apnea     CPAP at bedtime    Sleep apnea     Stroke (HCC)     Suicidal intent      Past Surgical History:   Procedure Laterality Date    CATARACT EXTRACTION      CATARACT EXTRACTION      COLONOSCOPY      HAND SURGERY Right     OTHER SURGICAL HISTORY      REPAIR OF SUPERFICIAL WOUND FACE    RI ESOPHAGOGASTRODUODENOSCOPY TRANSORAL DIAGNOSTIC N/A 06/14/2018    Procedure: ESOPHAGOGASTRODUODENOSCOPY (EGD);  Surgeon: Yinka Maier MD;  Location: [facility];  Service: Gastroenterology    RI ESOPHAGOGASTRODUODENOSCOPY TRANSORAL DIAGNOSTIC N/A 09/12/2018    Procedure: EGD AND COLONOSCOPY;  Surgeon: Yinka Maier MD;  Location: [facility];  Service: Gastroenterology       Performed at least 2 patient identifiers during session: Name, Birthday, ID bracelet, and Epic photo     10/15/24 1406   PT Last Visit   PT Visit Date 10/15/24   Note Type   Note type Evaluation  (& treatment)   Pain Assessment   Pain Assessment Tool 0-10   Pain Score No Pain   Restrictions/Precautions   Weight Bearing Precautions Per Order No   Other Precautions Chair  Alarm;Bed Alarm;Impulsive;Aspiration;Multiple lines;Telemetry;Fall Risk  (+R sided estefania-paresis)   Home Living   Type of Home House   Home Layout Two level;Stairs to enter with rails;Ramped entrance;Bed/bath upstairs  (1+1 ASHOK the home through front door, vs ramp on side of house; full flight of steps to 2nd floor bedroom; full bathroom on first floor; reports ability to maintain FFSU but would be sleeping on the couch)   Bathroom Accessibility Accessible   Home Equipment Other (Comment)  (none for pt (reports multiple pieces of DME for spouse))   Prior Function   Level of [place] Independent with ADLs;Independent with functional mobility;Needs assistance with IADLS   Lives With Spouse;Family   Receives Help From Family   IADLs Independent with meal prep;Independent with medication management;Family/Friend/Other provides transportation  (pt does not drives, s/o completes all driving)   Falls in the last 6 months 1 to 4  (family reports <4 falls but more than 1)   Vocational Unemployed   Comments Pt with significant dysarthria making conversation difficult. Family provides assistance to fill in gaps for PLOF information. Pt reports that at baseline he is fully independent with all aspects of self care and functional mobility of household and community distances with no AD.   General   Additional Pertinent History Pt is a 59 yr old male admitted on stroke pathway 10/15/24 with R sided weakness, fall, slurred speech, word finding difficulty, headache, hypertension; (h/o same symptoms 1 month prior but did not seek medical attention d/t resolution). +TnK, per CC and RN pt appropriate to work with <24hrs from TnK administration. CT/CTA (-) acute abnormality. Echo and MRI pending.   Family/Caregiver Present Yes   Cognition   Overall Cognitive Status WFL   Arousal/Participation Cooperative   Orientation Level Oriented X4   Memory Within functional limits   Following Commands Follows multistep commands with increased  "time or repetition   Subjective   Subjective \"As long as you don't make me walk today. I'm afraid of falling.\"   RUE Assessment   RUE Assessment X   RUE Strength   RUE Overall Strength Deficits  (grossly 1/5 to 2-/5 MMT at shoulder, elbow, wrist, and grasp = 0/5 MMT)   LUE Assessment   LUE Assessment WFL   RLE Assessment   RLE Assessment X  (moderate edema)   Strength RLE   R Hip Flexion 2-/5   R Knee Flexion 2+/5   R Knee Extension 2+/5   R Ankle Dorsiflexion 2+/5   Tone RLE   RLE Tone WFL   LLE Assessment   LLE Assessment X  (moderate edema)   Strength LLE   L Hip Flexion 3+/5   L Knee Flexion 4-/5   L Knee Extension 4/5   L Ankle Dorsiflexion 4/5   Tone LLE   LLE Tone WFL   Vision-Basic Assessment   Current Vision Does not wear glasses   Patient Visual Report Other (Comment)  (pt denies any acute changes in vision during current medical episode)   Coordination   Movements are Fluid and Coordinated 0   Coordination and Movement Description Pt with baseline tardive dyskinesia, limited coordionation d/t R UE weakness and trunk weakness/instability.   Sensation WFL  (pt denies any difference in sensation between B UEs and B LEs)   Light Touch   RLE Light Touch Grossly intact   LLE Light Touch Grossly intact   Proprioception   RLE Proprioception Grossly intact   LLE Proprioception Grossly Intact   Bed Mobility   Supine to Sit 3  Moderate assistance   Additional items Assist x 1;HOB elevated;Bedrails;Increased time required;Verbal cues  (trunk management, increased time and effort by pt)   Sit to Supine   (NT as pt was left seated OOB in recliner chair at end of session with alarm engaged)   Additional Comments Pt able to maintain upright balance/posture with variable MODA progressing to close S. Intermittent R sided trunk lean, pt able to recognize and correct with ASHLEY. Pt denies lightheadeness or dizziness with changes in positioning.   Transfers   Sit to Stand 3  Moderate assistance   Additional items Assist x " 1;Bedrails;Impulsive;Increased time required;Verbal cues  (pt impulsive to initiate transfer, but increased time and effort needed to complete; L UE grasp on bedrail, no AD)   Stand to Sit 3  Moderate assistance   Additional items Assist x 1;Bedrails;Impulsive;Verbal cues  (no AD)   Stand pivot Unable to assess   Additional Comments Pt needing MODA progressing to ASHLEY to maintain supported standing balance with L UE on bedrail, R knee initially blocked by therapist however not necessary once pt gained stability. Pt with significant difficulty with weight shifting for attempt at pre gait activity.   Ambulation/Elevation   Gait pattern Poor UE support;Improper Weight shift;R Hemiparesis;R Foot drag;Narrow JENNA;Decreased foot clearance;Short stride   Gait Assistance 2  Maximal assist   Additional items Assist x 1;Verbal cues;Tactile cues   Assistive Device None   Distance 0ft - attempted weight shifting, static stepping, ant/posterior stepping for pre gait activity, however unsuccessful despite MAXA from therapist.  (see below for additional (pre) gait/gait trials for progression of ambulation to OOB positioning in chair)   Stair Management Assistance Not tested  (pt has 1+1 ASHOK the home and 12-13 steps to 2nd floor bedroom)   Balance   Static Sitting Fair -   Dynamic Sitting Poor   Static Standing Poor   Dynamic Standing Poor -   Ambulatory Poor -   Endurance Deficit   Endurance Deficit Yes   Activity Tolerance   Activity Tolerance Patient limited by fatigue;Other (Comment)  (R estefania-paresis)   Medical Staff Made Aware Spoke with DESHAWN Stark   Assessment   Prognosis Good   Problem List Decreased strength;Decreased range of motion;Decreased endurance;Impaired balance;Decreased mobility;Decreased coordination;Obesity   Assessment Pt seen for PT evaluation for mobility assessment & discharge needs. Activity orders: OOB to chair. Pt admitted 10/14/2024 w/ dizziness and fall, R sided weakness, dysarthria, headache,  hypertension, dx CVA (cerebral vascular accident) (HCC). Comorbidities affecting pt's fnxl performance include: HTN, Bipolar disorder, tardive dyskinesia, DM, CKD. During PT IE, pt requires MODA for trunk management during supine>short sit EOB, variable close S to MODA to maintain upright trunk stability (R sided lean), and MODA for STS transfer from EOB, MODA progressing to ASHLEY to maintain standing. Pt displays above outlined functional impairments & limitations, and presents below his baseline level of functional mobility. The AM-PAC & Barthel Index outcome tools were used to assist in determining pt safety w/ mobility/self care & appropriate d/c recommendations, see above for scores. Pt is at risk of falls d/t multiple comorbidities, h/o falls, impaired balance, acuity of medical illness, ongoing medical treatment of primary dx, and polypharmacy. Pt's clinical presentation is currently unstable/unpredictable as seen in pt's presentation of varying levels of cognitive performance, increased fall risk, new onset of impairment of functional mobility, decreased endurance, and new onset of weakness. Pt will benefit from continued PT services in order to address impairments, decrease risk of falls, maximize independence w/ fnxl mobility, & ensure safety w/ mobility for transition to next level of care. Based on pt presentation & impairments, pt would most appropriately benefit from Level 1 (maximal PT intensity) resources upon d/c.   Barriers to Discharge Inaccessible home environment   Goals   Patient Goals to maximize independence with function   STG Expiration Date 10/29/24   Short Term Goal #1 Patient PT goals established in order to maximize functional independence and safety. Pt will: complete all bed mobility independently in flat bed in order to promote increased OOB functional mobility and simulate home environment; complete all transfers with LRAD and S in order to increase safety with functional mobility;  ambulate >80ft with LRAD and ASHLEY in order to increase safety with household and in facility distance functional mobility; negotiate 2-3 stairs with LRAD and ASHLEY in order to facilitate safe access to his home; improve R LE strength to >/= 3+/5 MMT t/o in order to increase safety with functional mobility and decrease risk of falls; demonstrate understanding and independence with LE strengthening HEP; improve ambulatory balance to >/= fair grade in order to promote safety and increased independence with mobility; tolerate >3hrs OOB in upright position, in order to improve muscular endurance and respiratory status; improve AM-PAC score to >/= 16/24 in order to increase independence with mobility and decrease burden of care; improve Barthel Index score to >/= 50/100 in order to increase independence and decrease risk of falls.   PT Treatment Day 1   Plan   Treatment/Interventions Functional transfer training;LE strengthening/ROM;Elevations;Therapeutic exercise;Endurance training;Patient/family training;Equipment eval/education;Bed mobility;Gait training;Compensatory technique education;Spoke to nursing;Spoke to case management   PT Frequency 4-6x/wk   Discharge Recommendation   Rehab Resource Intensity Level, PT I (Maximum Resource Intensity)   Equipment Recommended Other (Comment)  (TBD pending progress)   AM-PAC Basic Mobility Inpatient   Turning in Flat Bed Without Bedrails 2   Lying on Back to Sitting on Edge of Flat Bed Without Bedrails 2   Moving Bed to Chair 2   Standing Up From Chair Using Arms 2   Walk in Room 2   Climb 3-5 Stairs With Railing 1   Basic Mobility Inpatient Raw Score 11   Basic Mobility Standardized Score 30.25   [facility] Highest Level Of Mobility   [facility] Goal 4: Move to chair/commode   -[facility] Achieved 5: Stand (1 or more minutes)   Modified Jerry Scale   Modified [place] Scale 4   Barthel Index   Feeding 5   Bathing 0   Grooming Score 0   Dressing Score 5   Bladder Score 10   Bowels Score  10   Toilet Use Score 5   Transfers (Bed/Chair) Score 5   Mobility (Level Surface) Score 0   Stairs Score 0   Barthel Index Score 40   Additional Treatment Session   Start Time 1350   End Time 1406   Treatment Assessment Pt is agreeable to participate in additional mobility/gait training post IE. Pt continues to require MODA for sit>stand transfer, increased time to gain stability, and is able to maintain supported and unsupported standing with ASHLEY. Pt then requires MAXA for weight shifting and advancing of RLE for ambulation of 5ft with no AD. Pt with significant difficulty with weight shifting and advancing R LE, no buckling noted with WB to R LE. Pt with quick onset fatigue, and requires MAXA for safe and controlled descent to target surface. At end of session, pt was left seated OOB in recliner chair with alarm engaged and needs in reach, care returned to RN. Regarding functional mobility, pt remains significantly below his baseline level of functional mobility and requires extensive additional skilled PT services. Continue to recommend Level 1 (maximal PT intensity) resources once medically cleared for d/c from the acute care setting. Will continue skilled PT POC as able and appropriate to address functional impairments and progress towards therapy goals. Next session, plan for intervention of additional transfer and gait training with use of L sided estefania-walker as able.   Additional Treatment Day 1   End of Consult   Patient Position at End of Consult Bedside chair;Bed/Chair alarm activated;All needs within reach  (family in room, care returned to RN)     Based on patient's [facility] Highest Level of Mobility scores today, patient currently has a goal of [facility] Levels: 5: STAND (1 OR MORE MINUTES), to be completed with RN staffing each shift, in order to improve overall activity tolerance and mobility, combat hospital related deconditioning, and maximize outcomes for d/c from the acute care setting.      The patient's AM-PAC Basic Mobility Inpatient Short Form Raw Score is 11. A Raw score of less than or equal to 16 suggests the patient may benefit from discharge to post-acute rehabilitation services. Please also refer to the recommendation of the Physical Therapist for safe discharge planning.      Jania Lloyd PT, DPT   Available via [employer]  NPI # [id number]  [place] License - [id number]  10/15/2024

## 2024-10-16 ENCOUNTER — APPOINTMENT (INPATIENT)
Dept: MRI IMAGING | Facility: HOSPITAL | Age: 59
DRG: 061 | End: 2024-10-16
Payer: MEDICARE

## 2024-10-16 LAB
ANION GAP SERPL CALCULATED.3IONS-SCNC: 5 MMOL/L (ref 4–13)
BASE EX.OXY STD BLDV CALC-SCNC: 75.6 % (ref 60–80)
BASE EXCESS BLDV CALC-SCNC: -4.8 MMOL/L
BASOPHILS # BLD AUTO: 0.05 THOUSANDS/ΜL (ref 0–0.1)
BASOPHILS NFR BLD AUTO: 1 % (ref 0–1)
BUN SERPL-MCNC: 27 MG/DL (ref 5–25)
CALCIUM SERPL-MCNC: 8.5 MG/DL (ref 8.4–10.2)
CHLORIDE SERPL-SCNC: 110 MMOL/L (ref 96–108)
CHOLEST SERPL-MCNC: 154 MG/DL
CO2 SERPL-SCNC: 22 MMOL/L (ref 21–32)
CREAT SERPL-MCNC: 3.27 MG/DL (ref 0.6–1.3)
EOSINOPHIL # BLD AUTO: 0.26 THOUSAND/ΜL (ref 0–0.61)
EOSINOPHIL NFR BLD AUTO: 4 % (ref 0–6)
ERYTHROCYTE [DISTWIDTH] IN BLOOD BY AUTOMATED COUNT: 14.6 % (ref 11.6–15.1)
EST. AVERAGE GLUCOSE BLD GHB EST-MCNC: 186 MG/DL
EST. AVERAGE GLUCOSE BLD GHB EST-MCNC: 200 MG/DL
GFR SERPL CREATININE-BSD FRML MDRD: 19 ML/MIN/1.73SQ M
GLUCOSE SERPL-MCNC: 118 MG/DL (ref 65–140)
GLUCOSE SERPL-MCNC: 138 MG/DL (ref 65–140)
GLUCOSE SERPL-MCNC: 148 MG/DL (ref 65–140)
GLUCOSE SERPL-MCNC: 232 MG/DL (ref 65–140)
HBA1C MFR BLD: 8.1 %
HBA1C MFR BLD: 8.6 %
HCO3 BLDV-SCNC: 20.8 MMOL/L (ref 24–30)
HCT VFR BLD AUTO: 32.8 % (ref 36.5–49.3)
HDLC SERPL-MCNC: 40 MG/DL
HGB BLD-MCNC: 10.8 G/DL (ref 12–17)
IMM GRANULOCYTES # BLD AUTO: 0.03 THOUSAND/UL (ref 0–0.2)
IMM GRANULOCYTES NFR BLD AUTO: 0 % (ref 0–2)
LDLC SERPL CALC-MCNC: 90 MG/DL (ref 0–100)
LYMPHOCYTES # BLD AUTO: 1.69 THOUSANDS/ΜL (ref 0.6–4.47)
LYMPHOCYTES NFR BLD AUTO: 23 % (ref 14–44)
MAGNESIUM SERPL-MCNC: 2 MG/DL (ref 1.9–2.7)
MCH RBC QN AUTO: 25.7 PG (ref 26.8–34.3)
MCHC RBC AUTO-ENTMCNC: 32.9 G/DL (ref 31.4–37.4)
MCV RBC AUTO: 78 FL (ref 82–98)
MONOCYTES # BLD AUTO: 0.63 THOUSAND/ΜL (ref 0.17–1.22)
MONOCYTES NFR BLD AUTO: 9 % (ref 4–12)
NEUTROPHILS # BLD AUTO: 4.61 THOUSANDS/ΜL (ref 1.85–7.62)
NEUTS SEG NFR BLD AUTO: 63 % (ref 43–75)
NRBC BLD AUTO-RTO: 0 /100 WBCS
O2 CT BLDV-SCNC: 11.7 ML/DL
PCO2 BLDV: 40.2 MM HG (ref 42–50)
PH BLDV: 7.33 [PH] (ref 7.3–7.4)
PHOSPHATE SERPL-MCNC: 4 MG/DL (ref 2.7–4.5)
PLATELET # BLD AUTO: 177 THOUSANDS/UL (ref 149–390)
PMV BLD AUTO: 9.5 FL (ref 8.9–12.7)
PO2 BLDV: 42.9 MM HG (ref 35–45)
POTASSIUM SERPL-SCNC: 4.1 MMOL/L (ref 3.5–5.3)
RBC # BLD AUTO: 4.21 MILLION/UL (ref 3.88–5.62)
SODIUM SERPL-SCNC: 137 MMOL/L (ref 135–147)
TRIGL SERPL-MCNC: 120 MG/DL
WBC # BLD AUTO: 7.27 THOUSAND/UL (ref 4.31–10.16)

## 2024-10-16 PROCEDURE — 99222 1ST HOSP IP/OBS MODERATE 55: CPT

## 2024-10-16 PROCEDURE — 82948 REAGENT STRIP/BLOOD GLUCOSE: CPT

## 2024-10-16 PROCEDURE — 83036 HEMOGLOBIN GLYCOSYLATED A1C: CPT

## 2024-10-16 PROCEDURE — 83735 ASSAY OF MAGNESIUM: CPT

## 2024-10-16 PROCEDURE — NC001 PR NO CHARGE: Performed by: INTERNAL MEDICINE

## 2024-10-16 PROCEDURE — 80061 LIPID PANEL: CPT

## 2024-10-16 PROCEDURE — 82805 BLOOD GASES W/O2 SATURATION: CPT

## 2024-10-16 PROCEDURE — 85025 COMPLETE CBC W/AUTO DIFF WBC: CPT

## 2024-10-16 PROCEDURE — 92526 ORAL FUNCTION THERAPY: CPT

## 2024-10-16 PROCEDURE — 80048 BASIC METABOLIC PNL TOTAL CA: CPT

## 2024-10-16 PROCEDURE — 70551 MRI BRAIN STEM W/O DYE: CPT

## 2024-10-16 PROCEDURE — 94660 CPAP INITIATION&MGMT: CPT

## 2024-10-16 PROCEDURE — 99232 SBSQ HOSP IP/OBS MODERATE 35: CPT | Performed by: INTERNAL MEDICINE

## 2024-10-16 PROCEDURE — 94760 N-INVAS EAR/PLS OXIMETRY 1: CPT

## 2024-10-16 PROCEDURE — 84100 ASSAY OF PHOSPHORUS: CPT

## 2024-10-16 RX ORDER — HEPARIN SODIUM 5000 [USP'U]/ML
7500 INJECTION, SOLUTION INTRAVENOUS; SUBCUTANEOUS EVERY 8 HOURS SCHEDULED
Status: DISCONTINUED | OUTPATIENT
Start: 2024-10-16 | End: 2024-10-26 | Stop reason: HOSPADM

## 2024-10-16 RX ORDER — HEPARIN SODIUM 5000 [USP'U]/ML
5000 INJECTION, SOLUTION INTRAVENOUS; SUBCUTANEOUS EVERY 8 HOURS SCHEDULED
Status: DISCONTINUED | OUTPATIENT
Start: 2024-10-16 | End: 2024-10-16

## 2024-10-16 RX ORDER — CLOPIDOGREL BISULFATE 75 MG/1
75 TABLET ORAL DAILY
Status: DISCONTINUED | OUTPATIENT
Start: 2024-10-16 | End: 2024-10-26 | Stop reason: HOSPADM

## 2024-10-16 RX ORDER — PANTOPRAZOLE SODIUM 40 MG/10ML
40 INJECTION, POWDER, LYOPHILIZED, FOR SOLUTION INTRAVENOUS
Status: DISCONTINUED | OUTPATIENT
Start: 2024-10-16 | End: 2024-10-16

## 2024-10-16 RX ORDER — HYDRALAZINE HYDROCHLORIDE 20 MG/ML
10 INJECTION INTRAMUSCULAR; INTRAVENOUS EVERY 6 HOURS PRN
Status: DISCONTINUED | OUTPATIENT
Start: 2024-10-16 | End: 2024-10-26 | Stop reason: HOSPADM

## 2024-10-16 RX ADMIN — ATORVASTATIN CALCIUM 40 MG: 40 TABLET, FILM COATED ORAL at 17:23

## 2024-10-16 RX ADMIN — CHLORHEXIDINE GLUCONATE 15 ML: 1.2 RINSE ORAL at 21:49

## 2024-10-16 RX ADMIN — LABETALOL HYDROCHLORIDE 100 MG: 100 TABLET, FILM COATED ORAL at 08:54

## 2024-10-16 RX ADMIN — PANTOPRAZOLE SODIUM 40 MG: 40 INJECTION, POWDER, FOR SOLUTION INTRAVENOUS at 06:14

## 2024-10-16 RX ADMIN — VALBENAZINE 40 MG: 40 CAPSULE ORAL at 21:53

## 2024-10-16 RX ADMIN — LABETALOL HYDROCHLORIDE 100 MG: 100 TABLET, FILM COATED ORAL at 17:24

## 2024-10-16 RX ADMIN — HYDRALAZINE HYDROCHLORIDE 10 MG: 20 INJECTION, SOLUTION INTRAMUSCULAR; INTRAVENOUS at 19:05

## 2024-10-16 RX ADMIN — CHLORHEXIDINE GLUCONATE 15 ML: 1.2 RINSE ORAL at 08:54

## 2024-10-16 RX ADMIN — BUPROPION HYDROCHLORIDE TABLETS 100 MG: 100 TABLET, FILM COATED ORAL at 17:24

## 2024-10-16 RX ADMIN — CLOPIDOGREL BISULFATE 75 MG: 75 TABLET ORAL at 10:31

## 2024-10-16 RX ADMIN — BUPROPION HYDROCHLORIDE TABLETS 100 MG: 100 TABLET, FILM COATED ORAL at 08:54

## 2024-10-16 RX ADMIN — AMLODIPINE BESYLATE 10 MG: 10 TABLET ORAL at 08:54

## 2024-10-16 RX ADMIN — INSULIN LISPRO 4 UNITS: 100 INJECTION, SOLUTION INTRAVENOUS; SUBCUTANEOUS at 11:39

## 2024-10-16 RX ADMIN — HEPARIN SODIUM 7500 UNITS: 5000 INJECTION INTRAVENOUS; SUBCUTANEOUS at 10:31

## 2024-10-16 RX ADMIN — HEPARIN SODIUM 7500 UNITS: 5000 INJECTION INTRAVENOUS; SUBCUTANEOUS at 18:12

## 2024-10-16 RX ADMIN — ASPIRIN 81 MG: 81 TABLET, COATED ORAL at 10:31

## 2024-10-16 RX ADMIN — OLANZAPINE 10 MG: 10 TABLET, FILM COATED ORAL at 21:50

## 2024-10-16 NOTE — PLAN OF CARE
Problem: PAIN - ADULT  Goal: Verbalizes/displays adequate comfort level or baseline comfort level  Description: Interventions:  - Encourage patient to monitor pain and request assistance  - Assess pain using appropriate pain scale  - Administer analgesics based on type and severity of pain and evaluate response  - Implement non-pharmacological measures as appropriate and evaluate response  - Consider cultural and social influences on pain and pain management  - Notify physician/advanced practitioner if interventions unsuccessful or patient reports new pain  Outcome: Progressing     Problem: INFECTION - ADULT  Goal: Absence or prevention of progression during hospitalization  Description: INTERVENTIONS:  - Assess and monitor for signs and symptoms of infection  - Monitor lab/diagnostic results  - Monitor all insertion sites, i.e. indwelling lines, tubes, and drains  - Monitor endotracheal if appropriate and nasal secretions for changes in amount and color  - [place] appropriate cooling/warming therapies per order  - Administer medications as ordered  - Instruct and encourage patient and family to use good hand hygiene technique  - Identify and instruct in appropriate isolation precautions for identified infection/condition  Outcome: Progressing  Goal: Absence of fever/infection during neutropenic period  Description: INTERVENTIONS:  - Monitor WBC    Outcome: Progressing     Problem: SAFETY ADULT  Goal: Patient will remain free of falls  Description: INTERVENTIONS:  - Educate patient/family on patient safety including physical limitations  - Instruct patient to call for assistance with activity   - Consult OT/PT to assist with strengthening/mobility   - Keep Call bell within reach  - Keep bed low and locked with side rails adjusted as appropriate  - Keep care items and personal belongings within reach  - Initiate and maintain comfort rounds  - Make Fall Risk Sign visible to staff  - Apply yellow socks and bracelet  for high fall risk patients  - Consider moving patient to room near nurses station  Outcome: Progressing  Goal: Maintain or return to baseline ADL function  Description: INTERVENTIONS:  -  Assess patient's ability to carry out ADLs; assess patient's baseline for ADL function and identify physical deficits which impact ability to perform ADLs (bathing, care of mouth/teeth, toileting, grooming, dressing, etc.)  - Assess/evaluate cause of self-care deficits   - Assess range of motion  - Assess patient's mobility; develop plan if impaired  - Assess patient's need for assistive devices and provide as appropriate  - Encourage maximum independence but intervene and supervise when necessary  - Involve family in performance of ADLs  - Assess for home care needs following discharge   - Consider OT consult to assist with ADL evaluation and planning for discharge  - Provide patient education as appropriate  Outcome: Progressing  Goal: Maintains/Returns to pre admission functional level  Description: INTERVENTIONS:  - Perform AM-PAC 6 Click Basic Mobility/ Daily Activity assessment daily.  - Set and communicate daily mobility goal to care team and patient/family/caregiver.   - Collaborate with rehabilitation services on mobility goals if consulted  - Out of bed for toileting  - Record patient progress and toleration of activity level   Outcome: Progressing     Problem: DISCHARGE PLANNING  Goal: Discharge to home or other facility with appropriate resources  Description: INTERVENTIONS:  - Identify barriers to discharge w/patient and caregiver  - Arrange for needed discharge resources and transportation as appropriate  - Identify discharge learning needs (meds, wound care, etc.)  - Arrange for interpretive services to assist at discharge as needed  - Refer to Case Management Department for coordinating discharge planning if the patient needs post-hospital services based on physician/advanced practitioner order or complex needs  related to functional status, cognitive ability, or social support system  Outcome: Progressing     Problem: Knowledge Deficit  Goal: Patient/family/caregiver demonstrates understanding of disease process, treatment plan, medications, and discharge instructions  Description: Complete learning assessment and assess knowledge base.  Interventions:  - Provide teaching at level of understanding  - Provide teaching via preferred learning methods  Outcome: Progressing     Problem: Neurological Deficit  Goal: Neurological status is stable or improving  Description: Interventions:  - Monitor and assess patient's level of consciousness, motor function, sensory function, and level of assistance needed for ADLs.   - Monitor and report changes from baseline. Collaborate with interdisciplinary team to initiate plan and implement interventions as ordered.   - Provide and maintain a safe environment.  - Consider seizure precautions.  - Consider fall precautions.  - Consider aspiration precautions.  - Consider bleeding precautions.  Outcome: Progressing     Problem: Activity Intolerance/Impaired Mobility  Goal: Mobility/activity is maintained at optimum level for patient  Description: Interventions:  - Assess and monitor patient  barriers to mobility and need for assistive/adaptive devices.  - Assess patient's emotional response to limitations.  - Collaborate with interdisciplinary team and initiate plans and interventions as ordered.  - Encourage independent activity per ability.  - Maintain proper body alignment.  - Perform active/passive rom as tolerated/ordered.  - Plan activities to conserve energy.  - Turn patient as appropriate  Outcome: Progressing     Problem: Communication Impairment  Goal: Ability to express needs and understand communication  Description: Assess patient's communication skills and ability to understand information.  Patient will demonstrate use of effective communication techniques, alternative methods  of communication and understanding even if not able to speak.     - Encourage communication and provide alternate methods of communication as needed.  - Collaborate with case management/ for discharge needs.  - Include patient/family/caregiver in decisions related to communication.  Outcome: Progressing     Problem: Potential for Aspiration  Goal: Non-ventilated patient's risk of aspiration is minimized  Description: Assess and monitor vital signs, respiratory status, and labs (WBC).  Monitor for signs of aspiration (tachypnea, cough, rales, wheezing, cyanosis, fever).    - Assess and monitor patient's ability to swallow.  - Place patient up in chair to eat if possible.  - HOB up at 90 degrees to eat if unable to get patient up into chair.  - Supervise patient during oral intake.   - Instruct patient/ family to take small bites.  - Instruct patient/ family to take small single sips when taking liquids.  - Follow patient-specific strategies generated by speech pathologist.  Outcome: Progressing  Goal: Ventilated patient's risk of aspiration is minimized  Description: Assess and monitor vital signs, respiratory status, airway cuff pressure, and labs (WBC).  Monitor for signs of aspiration (tachypnea, cough, rales, wheezing, cyanosis, fever).    - Elevate head of bed 30 degrees if patient has tube feeding.  - Monitor tube feeding.  Outcome: Progressing     Problem: Nutrition  Goal: Nutrition/Hydration status is improving  Description: Monitor and assess patient's nutrition/hydration status for malnutrition (ex- brittle hair, bruises, dry skin, pale skin and conjunctiva, muscle wasting, smooth red tongue, and disorientation). Collaborate with interdisciplinary team and initiate plan and interventions as ordered.  Monitor patient's weight and dietary intake as ordered or per policy. Utilize nutrition screening tool and intervene per policy. Determine patient's food preferences and provide high-protein,  high-caloric foods as appropriate.     - Assist patient with eating.  - Allow adequate time for meals.  - Encourage patient to take dietary supplement as ordered.  - Collaborate with clinical nutritionist.  - Include patient/family/caregiver in decisions related to nutrition.  Outcome: Progressing     Problem: Prexisting or High Potential for Compromised Skin Integrity  Goal: Skin integrity is maintained or improved  Description: INTERVENTIONS:  - Identify patients at risk for skin breakdown  - Assess and monitor skin integrity  - Assess and monitor nutrition and hydration status  - Monitor labs   - Assess for incontinence   - Turn and reposition patient  - Assist with mobility/ambulation  - Relieve pressure over bony prominences  - Avoid friction and shearing  - Provide appropriate hygiene as needed including keeping skin clean and dry  - Evaluate need for skin moisturizer/barrier cream  - Collaborate with interdisciplinary team   - Patient/family teaching  - Consider wound care consult   Outcome: Progressing

## 2024-10-16 NOTE — PROGRESS NOTES
Progress Note - Critical Care/ICU   Name: Tommie Taylor 59 y.o. male I MRN: [id number]  Unit/Bed#: ICU 01 I Date of Admission: 10/14/2024   Date of Service: 10/16/2024 I Hospital Day: 1      Critical Care Interval Transfer Note:    Brief Hospital Summary: Patient presented to [facility] ED 10/14 with a chief complaint of right-sided weakness, right facial droop, and dysarthria.  CT head and CTA were negative for acute infarct and LVO respectively.  NIH on admission was 8 per ED note.  Last known well was 2200 the night of 10/14.  The decision was made to give TN K at 2340.  However TNK was mildly delayed due to patient's persistent hypertension with systolic blood pressure greater than 200.  Patient was placed on a Cardizem drip to get goal blood pressure less than 180/110.  Patient was taken to the ICU for post TNK monitoring.  Shortly after getting TNK staff reports that patient's dysarthria worsened and patient got a stat head CT which was negative for any signs of bleeding.  24-hour head CT post TNK was negative for any bleeding.  MRI was significant for left corona radiata infarct.  Patient was seen and evaluated by speech therapy who recommended a dysphagia level 1 diet.  Patient is still pending evaluation by PT/OT.  Patient was seen and evaluated by neurology who recommended DAPT for 21 days and then aspirin monotherapy indefinitely.  Echo was not completed with bubble study but did show some grade 2 diastolic dysfunction with a normal ejection fraction.  No cardiac events were noted on telemetry.  LDL was 90.  Patient was previously on atorvastatin 20 mg but was switched to atorvastatin 40 mg per stroke protocol.  Patient currently takes Norvasc 10 mg and labetalol 100 mg twice daily for his antihypertensive blood pressure medication.    Barriers to discharge:   PT/OT evaluation  Uncontrolled hypertension on patient's current blood pressure medication of Norvasc 10 mg and  labetalol 100 mg twice daily --CVA likely secondary to uncontrolled hypertension     Consults: IP CONSULT TO NEUROLOGY  IP CONSULT TO CASE MANAGEMENT  IP CONSULT TO PHYSICAL MEDICINE REHAB  IP CONSULT TO NUTRITION SERVICES    Recommended to review admission imaging for incidental findings and document in discharge navigator: Chart reviewed, no known incidental findings noted at this time.      Discharge Plan: Anticipate discharge in 24-48 hrs to rehab facility.       Patient seen and evaluated by Critical Care today and deemed to be appropriate for transfer to [facility] with Telemetry. Spoke to Dr. Miner from [facility] to accept transfer. Critical care can be contacted via SecureChat with any questions or concerns.

## 2024-10-16 NOTE — ASSESSMENT & PLAN NOTE
Lab Results   Component Value Date    EGFR 19 10/16/2024    EGFR 21 10/15/2024    EGFR 19 10/14/2024    CREATININE 3.27 (H) 10/16/2024    CREATININE 3.08 (H) 10/15/2024    CREATININE 3.24 (H) 10/14/2024     -Slight increase in Cr since last admission, continue monitoring

## 2024-10-16 NOTE — ARC ADMISSION
[facility] community Liaison called  family- introduced self, explained role, reviewed ARC program, services offered, acute rehab criteria, review of referral by ARC Medical Director, MC days, the three ARC locations and anticipated rehab length of stay.; all questions answered. family first choice is [facility] and second is  [facility]. family made aware ARC reviewer will communicate with the  who will keep patient updated on referral status.

## 2024-10-16 NOTE — ARC ADMISSION
Referral received for consideration of patient for inpatient acute rehab.    Reviewed patient's case - patient is preapproved for [facility] pending medical stability, functional progress/tolerance and bed availability. Noted patient is working towards goal normotension. CM has been updated. Will continue to follow at this time.

## 2024-10-16 NOTE — ASSESSMENT & PLAN NOTE
Lab Results   Component Value Date    HGBA1C 8.1 (H) 10/15/2024       Recent Labs     10/14/24  2244 10/15/24  1217 10/15/24  1722 10/16/24  0027   POCGLU 160* 250* 106 148*       Blood Sugar Average: Last 72 hrs:  (P) 166    -Currently maintained on semaglutide monotherapy  -Average blood sugar last 72 hours 160, no intervention at this time  -Will continue to monitor blood sugar as patient has been having blood glucose greater than 200.  Consider increasing to algorithm 4

## 2024-10-16 NOTE — SPEECH THERAPY NOTE
Speech Language/Pathology    Speech/Language Pathology Progress Note    Patient Name: Tommie Taylor  Today's Date: 10/16/2024         Subjective:  Pt seen for dysphagia tx follow up at breakfast. Pt sitting in chair, able to self feed with L hand. Noted pocketing of oatmeal in R sulcus. Still dysarthric     Objective:  Pt self feed spoonfuls of oatmeal and took sips of NTL by cup and straw. Larger sips by cup, able to suck from straw w/ placement on Left. Slow bolus manipulation noted. Anterior spill noted w/ oatmeal and large cup sips of NTL. Better oral control w/ NTL by straw. Slow transfer. Pt reminded to use lingual sweeps to clear R sulcus. No overt s/s aspiration.     Assessment:  Pt cont w/ oral dysphagia symptoms, pocketing on R w/ oatmeal and decreased oral processing/ control w/ NTL by cup, but no overt s/s aspiration w/ current diet.     Plan/Recommendations:  Cont puree w/ NTL  Meds as tolerated  Cue for lingual sweeps on R to clear pocketing  Will cont to follow.       Mya Watson MA CCC-SLP  Speech Pathologist  Available via [employer]

## 2024-10-16 NOTE — ASSESSMENT & PLAN NOTE
Patient presented with right upper and right lower extremity weakness with associated right facial droop, highly suggestive of CVA  Initial NIH 10, LKW 2200  CT/CTA head unremarkable for acute process  TnK administered following correction of hypertension with nicardipine drip  Following thrombolytic administration patient developed worsening slurred speech and headache, had repeat CT head which was unremarkable  Neurology consult  Repeat head CT at 24 hours post TNK negative for signs of bleed  MRI significant for left corona radiata stroke.  Will placed on DAPT therapy for 21 days and then continue aspirin monotherapy indefinitely per neurology.  Patient started on Lipitor 40 mg per stroke protocol  Recommend Zio patch outpatient to evaluate for any possible arrhythmia

## 2024-10-16 NOTE — PLAN OF CARE
Pt appears to be tolerating puree w/ NTL   Watch for pocketing on R, cue for lingual sweeps  NTL better w/ straw than cup  Aspiration precautions  Meds as tolerated

## 2024-10-16 NOTE — CASE MANAGEMENT
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 ([facility]) 10/08/2024     Hypertensive emergency 2024     Vision decreased 2024     Memory deficit 04/15/2024     Tremor 04/15/2024     B12 deficiency 04/15/2024     Tardive dyskinesia 2023     Lightheaded 2023     Loss of appetite 2023     Unintended weight loss 2023     Class 3 severe obesity due to excess calories with serious comorbidity and body mass index (BMI) of 45.0 to 49.9 in adult ([facility]) 2023     Vitamin D deficiency 2021     Hyperlipidemia 2020     Toenail deformity 2019     Persistent proteinuria 2019     Anemia, unspecified 2019     Elevated serum creatinine 2019     DAISY (obstructive sleep apnea)      Generalized anxiety disorder 2018     Hiatal hernia 2018     Lower esophageal ring ([place]) 2018     Insomnia 2018     Bipolar I disorder, severe, current or most recent episode depressed, with psychotic features ([facility]) 2017     Panic attacks 2017     GERD (gastroesophageal reflux disease) 2017     Flat foot 2016     Diabetic polyneuropathy ([facility]) 2014     Type 2 diabetes mellitus with hyperglycemia, without long-term current use of insulin ([facility]) 2014     Allergic rhinitis 2012       LOS (days): 1  Geometric Mean LOS (GMLOS) (days): 3.1  Days to GMLOS:1.7     OBJECTIVE:  Risk of Unplanned Readmission Score:  20.95         Current admission status: Inpatient   Preferred Pharmacy:   [id number] - [place], [place] - [address]  [address]  [place] [facility] [address]  Phone: [phone] Fax: [phone]    [id number] - [place], [place] - [address]  [address] [facility] [address]  Phone: [phone] Fax: [phone]    [id number] - [place], [place] - [address]  [address] [facility] [address]  Phone: [phone] Fax: [phone]    Primary Care Provider: No primary care provider on file.    Primary Insurance: MEDICARE  Secondary Insurance: [facility]    DISCHARGE DETAILS:    Discharge planning discussed with:: Pt's Marie slaughter  Freedom of Choice: Yes  Comments - Freedom of Choice: [facility] is preference    Contacts  Patient Contacts: Marie Slaughter  Relationship to Patient:: Family  Contact Method: Phone  Reason/Outcome: Continuity of Care, Emergency Contact, Referral, Discharge Planning    Other Referral/Resources/Interventions Provided:  Interventions: Acute Rehab  Referral Comments: CM spoke with pt's wifeMarie. Reviewed PT/OT recs for ARC level of rehab. She is agreeable, but wants to stay close to home so preference is [facility]. Aware CM will place referral and keep her posted if pt is approved. CM also reviewed that if pt were to improve with therapy while here at the hospital and cleared for home that HHC could be arranged. Referral to [facility].    Treatment Team Recommendation: Acute Rehab  Discharge Destination Plan:: Acute Rehab

## 2024-10-16 NOTE — DISCHARGE INSTR - DIET
Regular diet w/ thin liquids  Meds as tolerated-in applesauce  Watch for pocketing on R  Aspiration precautions

## 2024-10-16 NOTE — QUICK NOTE
Evaluated at bedside. RUE 2/5 throughout. RLE 4+/5 hip flexion, 5/5 knee flex/ext and ankle dorsi/plantarflex. Persistent R lower facial weakness on rest and with activation. Mod to severe dysarthria persistent.    TTE, 10/15: EF 70%, vigorous systolic fxn, G2DD, LA normal size, no major valvular dysfunction, normal IVC  CTH 24h post TNK, 10/15: No acute intracranial pathology including no hemorrhage.  MRI brain w/o contrast, 10/16: Personally reviewed - Acute L corona radiata infarct.    Plan for ASA and Plavix x 21 days, no load. Then ASA monotherapy.  Continue Lipitor upon discharge.  OK to move gradually towards normotension.  Continue to monitor on telemetry while inpatient. Will need outpatient Zio patch/holter monitor upon discharge - recommend cardiology referral.  Will need follow up with neurovascular attending/AP/resident in 6-8 weeks (60 min visit). No additional neurodiagnostics outpatient indicated at this time.     No further inpatient neurology recommendations at this time. Thank you for allowing us to be a part of his care. Please let us know if there are any acute questions or concerns.

## 2024-10-16 NOTE — PROGRESS NOTES
Progress Note - Critical Care/ICU   Name: Tommie Taylor 59 y.o. male I MRN: [id number]  Unit/Bed#: ICU 01 I Date of Admission: 10/14/2024   Date of Service: 10/16/2024 I Hospital Day: 1      Assessment & Plan  CVA (cerebral vascular accident) (HCC)  Patient presented with right upper and right lower extremity weakness with associated right facial droop, highly suggestive of CVA  Initial NIH 10, LKW 2200  CT/CTA head unremarkable for acute process  TnK administered following correction of hypertension with nicardipine drip  Following thrombolytic administration patient developed worsening slurred speech and headache, had repeat CT head which was unremarkable  Neurology consult  Repeat head CT at 24 hours post TNK negative for signs of bleed  MRI significant for left corona radiata stroke.  Will placed on DAPT therapy for 21 days and then continue aspirin monotherapy indefinitely per neurology.  Patient started on Lipitor 40 mg per stroke protocol  Recommend Zio patch outpatient to evaluate for any possible arrhythmia  Hypertensive emergency  -Hypertensive emergency in the setting of likely CVA  -evidenced by bp 197/104->231/105 POA  -Patient off Cardizem drip as of 10/15  Restarted patient's at home blood pressure medication Norvasc 10 mg and labetalol 100 mg twice daily.  Blood pressures are continuing to be elevated with systolic BP occasionally over 180.  Will consider recommending adjustment and at home blood pressure medications for better hypertensive control    Bipolar I disorder, severe, current or most recent episode depressed, with psychotic features (HCC)  -History of Bipolar disorder, follow with psych  -Continue Zyprexa, buproprion  GERD (gastroesophageal reflux disease)  -Continue home dose PPI  Hyperlipidemia  -Continue home statin therapy  Tardive dyskinesia  -Continue valbenazine  Type 2 diabetes mellitus with hyperglycemia, without long-term current use of insulin (HCC)  Lab Results   Component  Value Date    HGBA1C 8.1 (H) 10/15/2024       Recent Labs     10/14/24  2244 10/15/24  1217 10/15/24  1722 10/16/24  0027   POCGLU 160* 250* 106 148*       Blood Sugar Average: Last 72 hrs:  (P) 166    -Currently maintained on semaglutide monotherapy  -Average blood sugar last 72 hours 160, no intervention at this time  -Will continue to monitor blood sugar as patient has been having blood glucose greater than 200.  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 Yes  Need for changes to pain regimen? No   B: Both SAT/SAT  N/A   C: Choice of Sedation RASS Goal: 0 Alert and Calm  Need for changes to sedation or analgesia regimen? No   D: Delirium CAM-ICU: Negative   E: Early Mobility  Plan for early mobility? Yes   F: Family Engagement Plan for family engagement today? Yes         Prophylaxis:  VTE Contraindicated secondary to: will restart today   Stress Ulcer  covered byomeprazole (PriLOSEC) 20 mg delayed release capsule [[id number]] (Long-Term Med), omeprazole (PriLOSEC) 40 MG capsule [[id number]] (Long-Term Med), pantoprazole (PROTONIX) EC tablet 40 mg [[id number]], pantoprazole (PROTONIX) injection 40 mg [[id number]]         24 Hour Events : Overnight team reported that patient was AO x 1 which is unclear if patient has fluctuating mentation at baseline.  Patient continues to have right upper extremity and right lower extremity weakness as well as right-sided facial droop and dysarthria.  MRI significant for left coronary radiata infarct.  Will start on  DAPT therapy for 21 days and continue stroke workup.  Telemetry did not show any cardiac arrhythmias over the past 24 hours.  Subjective   Review of Systems: See HPI for Review of Systems    Objective :                   Vitals I/O      Most Recent Min/Max in 24hrs   Temp 97.5 °F (36.4 °C) Temp  Min: 97.5 °F (36.4 °C)  Max: 98.2 °F (36.8 °C)   Pulse 100 Pulse  Min: 78  Max: 100   Resp (!) 44 Resp  Min: 12  Max: 44   BP (!) 185/85 BP  Min: 132/63  Max: 185/85   O2 Sat 98 % SpO2  Min: 96 %  Max: 100 %      Intake/Output Summary (Last 24 hours) at 10/16/2024 0838  Last data filed at 10/16/2024 0754  Gross per 24 hour   Intake 780 ml   Output 1775 ml   Net -995 ml       Diet Dysphagia/Modified Consistency; Dysphagia 1-Pureed; Nectar Thick Liquid; Consistent Carbohydrate Diet Level 2 (5 carb servings/75 grams CHO/meal)    Invasive Monitoring           Physical Exam   Physical Exam  Eyes:      Extraocular Movements: Extraocular movements intact.      Pupils: Pupils are equal, round, and reactive to light.   Skin:     General: Skin is warm and dry.      Coloration: Skin is not jaundiced.   HENT:      Head: Normocephalic.      Right Ear: Hearing normal.      Left Ear: Hearing normal.      Nose: No congestion.      Mouth/Throat:      Pharynx: No oropharyngeal exudate.   Neck:   no midline tenderness Cardiovascular:      Rate and Rhythm: Normal rate.      Pulses: Normal pulses.   Musculoskeletal:         General: No swelling.      Right lower leg: Trace Edema present.      Left lower leg: Trace Edema present.   Abdominal:      Tenderness: There is no abdominal tenderness.   Constitutional:       General: He is not in acute distress.  Pulmonary:      Effort: Pulmonary effort is normal.   Neurological:      Mental Status: He is alert and oriented to person, place and time.      Cranial Nerves: Dysarthria and facial asymmetry present.      Motor: Motor deficit.      Comments: Right upper extremity 0 out of 5  Right lower  extremity 3 out of 5  Continues to have right-sided facial droop  Mild dysarthria          Diagnostic Studies        Lab Results: I have reviewed the following results:     Medications:  Scheduled PRN   amLODIPine, 10 mg, Daily  atorvastatin, 40 mg, QPM  buPROPion, 100 mg, BID  chlorhexidine, 15 mL, Q12H MIGEL  insulin lispro, 2-12 Units, TID AC  labetalol, 100 mg, BID  OLANZapine, 10 mg, HS  [Held by provider] pantoprazole, 40 mg, Early Morning  pantoprazole, 40 mg, Q24H MIGEL  Valbenazine Tosylate, 40 mg, HS      polyethylene glycol, 17 g, Daily PRN  traZODone, 50 mg, HS PRN       Continuous          Labs:   CBC    Recent Labs     10/15/24  0553 10/16/24  0504   WBC 8.06 7.27   HGB 10.9* 10.8*   HCT 32.1* 32.8*    177     BMP    Recent Labs     10/15/24  0553 10/16/24  0504   SODIUM 134* 137   K 4.1 4.1    110*   CO2 22 22   AGAP 6 5   BUN 32* 27*   CREATININE 3.08* 3.27*   CALCIUM 8.2* 8.5       Coags    Recent Labs     10/14/24  2311   INR 1.00   PTT 26        Additional Electrolytes  Recent Labs     10/15/24  0553 10/16/24  0504   MG 1.6* 2.0   PHOS 4.2 4.0          Blood Gas    No recent results  No recent results LFTs  Recent Labs     10/14/24  2312   ALT 10   AST 13   ALKPHOS 77   ALB 2.9*   TBILI 0.26       Infectious  No recent results  Glucose  Recent Labs     10/14/24  2312 10/15/24  0553 10/16/24  0504   GLUC 171* 198* 138

## 2024-10-16 NOTE — CONSULTS
PHYSICAL MEDICINE AND REHABILITATION CONSULT NOTE  Tommie Taylor 59 y.o. male MRN: [id number]  Unit/Bed#: ICU 01 Encounter: [id number]    Requested by:   Tamara Vallejo MD   Reason for Consultation: Stroke  Primary insurance listed on facesheet:  Medicare     Assessment and Recommendations:  59-year-old male with a past medical history of morbid obesity BMI almost 45, diabetes mellitus 2, diabetic neuropathy, chronic kidney disease stage IV, hypertension, hyperlipidemia, anemia, GERD, grade B esophagitis, gastritis, hiatal hernia anxiety, bipolar 1 disorder, tardive dyskinesia who presented to the hospital with right sided weakness, right facial droop, and dysarthria.  CT head did not show acute findings, CTA of head and neck was unremarkable.  Patient was evaluated by neurology and recommended TNK which was delayed some until blood pressure was better controlled.  Patient did have some worsening dysarthria after TNK with follow-up CT head without signs of bleeding.  MRI brain showed acute left corona radiata infarct with microangiopathic changes.  Echocardiogram showed grade 2 diastolic dysfunction.  Patient recommended dual antiplatelet therapy for 21 days and then aspirin monotherapy indefinitely.  Patient evaluated by speech therapy and found to have oral dysphagia with recommendation for puréed nectar thick liquid diet.      Tommie Taylor was evaluated by PT, OT, ST and now by PMR physician and found to have new and significant deficits in ADLs, mobility, and swallowing.    Prior Level of Function and Social history:    Patient lives with spouse and family (2 grandchildren, niece, older family member as well); wife has family in area as well; in two-level home with 1+1 step to enter the front door and a ramp on the side of the house with a full bath on the first floor and a full flight of steps to the second floor bathroom.  Independent with ADLs  Independent with ambulation    Current Level  of Function:    Transfers moderate assist, ambulation essentially dependent  Lower body dressing, lower body bathing, toileting  Moderate upper body bathing upper body dressing     The patient with following culmination of conditions is expected to significantly benefit from direct rehabilitation physician oversight to optimally monitor and manage in post-acute rehab setting.  Optimal functional outcomes expected using coordinated interdisciplinary team approach (PMR MD, skilled nursing, PT, OT, ST) while providing intensive inpatient rehabilitation.  The patient is expected to adequately participate in (tolerate) and benefit significantly from intensive level of therapies offered in acute rehabilitation setting.  The patient is also expected to benefit from increased nursing oversight and medical management not available in subacute/SNF setting.    Acute corona radiata infarct  Right hemiparesis  Imbalance, incoordination   Dysphagia after stroke  Dysarthria  Obesity  Obstructive sleep apnea  Diabetes mellitus 2  Diabetic peripheral neuropathy  CKD stage IV  Anemia  Hypertension  Mood disorder, tardive dyskinesia  GERD, esophagitis, gastritis, hiatal hernia    Disposition recommendation:  ARC/IRF  Patient is good candidate for transfer to [facility]IRF pending:    - Medical clearance/stability  - Facility acceptance, insurance authorization, and bed availability    Acute corona radiata infarct  - Med/surgical management:   Aspirin and Plavix for 21 days followed by aspirin monotherapy   Statin optimal blood pressure and diabetes control   Outpatient Zio patch/Holter monitor  - Neuropsych Med review:    Wellbutrin 100 mg twice daily, Zyprexa 10 mg at bedtime, valbenazine 40 mg at bedtime trazodone 50 mg at bedtime as needed  - Status and residual impairments:   Right hemiparesis, Imbalance, incoordination, dysphagia after stroke, dysarthria, potential for cognitive impairments    - Continue PT, OT in acute setting to  improve ADLs, mobility, strength, conditioning, and decrease risks of immobility as well as to assist with dispo recommendations   - Decrease risks of complications related to decreased mobility status and monitor for them during course  - MSK - atrophy, contractures, bone demineralization, CV - DVT/PE, orthostasis, decreased CO, Resp - atelectasis, PNA, GI - constipation, reduced appetite,  - retention, incontinence, Skin - pressure injuries  - Continue SLP for dysphagia (see dysphagia mgmt below)  - Monitor for post-stroke pain both central and peripheral   - Monitor for post-stroke shoulder pain/subluxation   - Initiate shoulder subluxation precautions   - OT/PT gentle ROM, strengthening, NMES, consider K-tape  - Consider sling during transfers and mobilizing unless with skilled therapies  - Hand hygiene/towel rolls/splinting (ie resting hand splint) if needed  - If available in acute setting, recommend cognitive therapy with SLP and OT along with SLP/OT in acute rehab setting  - Optimal bowel/bladder mgmt/hygiene - monitor for retention, incontinence, infection     - Turn patient Q2H, skin checks minimum Qshift  - ROM of major joints 2-3 times per day (if unable to do it on own)  - Supportive counseling and updates to family (as appropriate)   - Fall precautions - if needed increase rounding or consider virtual sitter or in-person sitter  - Overall mgmt per primary team currently, recommend optimal mgmt during rehab process as well  - Remains at risk for increased confusion/delirium, restlessness, agitation, and fall - continue to monitor for concerns/changes  - Monitor neuro-exam, wakefulness, mood, cognition, insight into deficits and safety awareness   - Monitor and ensure optimal management electrolytes, nutrition, and hydration  - Monitor for signs or symptoms of infection, medication intolerances, other systemic etiologies  - Additional labs, imaging, specialist follow-up as needed per primary team  currently   - Overstimulation precautions, frequent re-orientation, re-direction, re-assurance  - Optimal mood, pain, and sleep management  - If impaired sleep or behavior recommend sleep log and agitation monitoring    - Limit sedating medications when possible  - For routine restlessness, anxiety, irritability focus on non-pharmacologic management    - Hold benzo's with increased risk paradoxical reaction and possibility of limiting cognitive recovery  - Recommend acute comprehensive interdisciplinary inpatient rehabilitation to include intensive skilled therapies (PT, OT, ST) with oversight and management by rehabilitation physician.  Inpatient rehab level nursing, case management and weekly interdisciplinary team meetings.     - Can be done in ARC setting:  - Obtain MOCA when cog status stabilized and if needed ACLS during ARC course   - Assess iADLs - ability to manage medications, meal prep, finances, obtaining appropriate transport from community, managing emergencies, and overall safety in home environment.  - Provide therapy, compensatory strategies, and if available and necessary provide caregiver training.     Dysphagia after stroke  - Current diet: Modified - D1, NTL  - Monitor strict oral intake given likelihood of dehydration related to thickened liquids  - SLP evaluate and treat decline in swallowing.  - Aspiration precautions; HOB completely upright when eating, 100% supervision, monitor for pocketing  - Hold oral intake if patient too confused or lethargic and notify MD   - Oral care with meals and bedtime  - Monitor for appropriate fluid and nutrition intake and ensure adequate hydration/nutrition  - Recommend Nutrition consult to assist with management     Dysarthria  - SLP eval and treat  - Staff to encourage clear appropriate speech  - Supportive counseling    Obesity, DAISY  -  on appropriate nutrition and activity  - Adjustments accordingly during skilled therapy   - Monitor skin closely  for breakdown, wounds, rashes; keep clean and dry, turning Q2H   - Follow-up with PCP after d/c  - CPAP or noct ox and PRN supp O2    Diabetes mellitus 2  - Current management by internal medicine  - Monitor for signs and symptoms of hypoglycemia   - Current meds: per IM   - Consistent carb diabetic diet  - Recommend close monitoring and optimal management during recovery/rehab phase as well     Diabetic peripheral neuropathy  - Optimal DM mgmt  - Monitor skin for increased infection/breakdown/wounds which patient is at higher risk for  - Skilled PT/OT   - Fall precautions    CKD stage IV  - Management per nephrology currently   - Recommend optimal mgmt during rehab process as well   - Monitor BUN/Cr intermittently as well as electrolytes   - Limit nephrotoxic agents when possible  - Optimal BP mgmt     Hypertension  - Current management at discretion of primary team   - Ensure optimal management during rehab process  - Monitor vitals with and without activity; monitor for orthostasis  - Monitor hemoglobin, electrolytes, kidney function, hydration status   - Current meds: per other providers    Mood disorder, tardive dyskinesia  - Wellbutrin 100 mg twice daily, Zyprexa 10 mg at bedtime, valbenazine 40 mg at bedtime trazodone 50 mg at bedtime as needed  - Monitor mood, sleep   - Supportive counseling  - Counseled on and continue to encourage deep breathing/relaxation/behavioral management techniques    GERD, esophagitis, gastritis, hiatal hernia  - PPI     Anemia  - Mgmt per primary team and recommend optimal mgmt during rehab process  - Monitor H/H, vitals, signs/symptoms of acute bleeding     VTE prophylaxis   As outlined by primary team      Other medical issues:     Per primary service (and relevant consultants if applicable)      ==================================================================    Chief Complaint: Right-sided weakness    History of Present Illness:   59-year-old male with a past medical history  of morbid obesity BMI almost 45, diabetes mellitus 2, diabetic neuropathy, chronic kidney disease stage IV, hypertension, hyperlipidemia, anemia, GERD, grade B esophagitis, gastritis, hiatal hernia anxiety, bipolar 1 disorder, tardive dyskinesia who presented to the hospital with right sided weakness, right facial droop, and dysarthria.  CT head did not show acute findings, CTA of head and neck was unremarkable.  Patient was evaluated by neurology and recommended TNK which was delayed some until blood pressure was better controlled.  Patient did have some worsening dysarthria after TNK with follow-up CT head without signs of bleeding.  MRI brain showed acute left corona radiata infarct with microangiopathic changes.  Echocardiogram showed grade 2 diastolic dysfunction.  Patient recommended dual antiplatelet therapy for 21 days and then aspirin monotherapy indefinitely.  Patient evaluated by speech therapy and found to have oral dysphagia with recommendation for puréed nectar thick liquid diet.      On evaluation, patient reports ongoing right arm greater than right leg weakness without sensory changes.  He denies pain, lightheadedness, shortness of breath, cough, uncontrolled mood or other new complaints.    Review of Systems:     Complete review of systems obtained.    Please see HPI for details with other significant symptoms or history listed here:    Otherwise, 14 point review of systems completed and was otherwise unremarkable.    Allergies   Allergen Reactions    Pollen Extract Nasal Congestion    Tetanus Toxoid Swelling       Current Facility-Administered Medications:     amLODIPine (NORVASC) tablet 10 mg, 10 mg, Oral, Daily, Tamara Vallejo MD, 10 mg at 10/16/24 0854    aspirin (ECOTRIN LOW STRENGTH) EC tablet 81 mg, 81 mg, Oral, Daily, Tamara Vallejo MD, 81 mg at 10/16/24 1031    atorvastatin (LIPITOR) tablet 40 mg, 40 mg, Oral, QPM, Tamara Vallejo MD, 40 mg at 10/15/24  1806    buPROPion (WELLBUTRIN) tablet 100 mg, 100 mg, Oral, BID, Glenn Satnos MD, 100 mg at 10/16/24 0854    chlorhexidine (PERIDEX) 0.12 % oral rinse 15 mL, 15 mL, Mouth/Throat, Q12H MIGEL, Glenn Santos MD, 15 mL at 10/16/24 0854    clopidogrel (PLAVIX) tablet 75 mg, 75 mg, Oral, Daily, Tamara Vallejo MD, 75 mg at 10/16/24 1031    heparin (porcine) subcutaneous injection 7,500 Units, 7,500 Units, Subcutaneous, Q8H MIGEL, Ewa Tillman DO, 7,500 Units at 10/16/24 1031    hydrALAZINE (APRESOLINE) injection 10 mg, 10 mg, Intravenous, Q6H PRN, Tamara Vallejo MD    insulin lispro (HumALOG/ADMELOG) 100 units/mL subcutaneous injection 2-12 Units, 2-12 Units, Subcutaneous, TID AC, 4 Units at 10/16/24 1139 **AND** Fingerstick Glucose (POCT), , , TID AC, Tamara Vallejo MD    labetalol (NORMODYNE) tablet 100 mg, 100 mg, Oral, BID, Ewa Tillman DO, 100 mg at 10/16/24 0854    OLANZapine (ZyPREXA) tablet 10 mg, 10 mg, Oral, HS, Glenn Santos MD, 10 mg at 10/15/24 2206    pantoprazole (PROTONIX) EC tablet 40 mg, 40 mg, Oral, Early Morning, Tamara Vallejo MD, 40 mg at 10/15/24 0553    polyethylene glycol (MIRALAX) packet 17 g, 17 g, Oral, Daily PRN, Ewa Tillman DO    traZODone (DESYREL) tablet 50 mg, 50 mg, Oral, HS PRN, Glenn Santos MD    Valbenazine Tosylate CAPS 40 mg, 40 mg, Oral, HS, Glenn Santos MD    Past Medical History:   Diagnosis Date    ADHD, adult residual type     Anxiety     Anxiety     Bipolar 1 disorder (HCC)     Cataract     Left eye    Chronic kidney disease     Colon polyp     CPAP (continuous positive airway pressure) dependence     Depression     Depression     Diabetes mellitus (HCC)     blood sugar 167 on admission    Equinus deformity of foot     GERD (gastroesophageal reflux disease)     Homicidal ideations     Hyperlipidemia     Hypertension     Microalbuminuria     Morbid obesity (HCC)     Neuropathy     Shortness of breath     Sleep  "apnea     CPAP at bedtime    Sleep apnea     Stroke (HCC)     Suicidal intent        Past Surgical History:   Procedure Laterality Date    CATARACT EXTRACTION      CATARACT EXTRACTION      COLONOSCOPY      HAND SURGERY Right     OTHER SURGICAL HISTORY      REPAIR OF SUPERFICIAL WOUND FACE    AR ESOPHAGOGASTRODUODENOSCOPY TRANSORAL DIAGNOSTIC N/A 2018    Procedure: ESOPHAGOGASTRODUODENOSCOPY (EGD);  Surgeon: Yinka Maier MD;  Location: [facility];  Service: Gastroenterology    AR ESOPHAGOGASTRODUODENOSCOPY TRANSORAL DIAGNOSTIC N/A 2018    Procedure: EGD AND COLONOSCOPY;  Surgeon: Yinka Maier MD;  Location: [facility];  Service: Gastroenterology      Family History   Problem Relation Age of Onset    Diabetes Mother     Alcohol abuse Father     Diabetes Father     Hypertension Father     Lung cancer Father     Stroke Father     Cancer Father         lung    Alcohol abuse Sister     Depression Sister     Lymphoma Sister     Alcohol abuse Family     Alcohol abuse Sister     Alcohol abuse Sister     Cancer Sister     Heart disease Neg Hx     Thyroid disease Neg Hx        Social History available currently in EMR:  (See additional SH as outlined above)   Social History     Socioeconomic History    Marital status: /Civil Union     Spouse name: None    Number of children: 1    Years of education: None    Highest education level: None   Occupational History    Occupation: On disability   Tobacco Use    Smoking status: Former     Current packs/day: 0.00     Types: Cigarettes     Quit date:      Years since quittin.8    Smokeless tobacco: Current     Types: Chew    Tobacco comments:     pt \"Quit after approx over 25 years ago\"   Vaping Use    Vaping status: Never Used   Substance and Sexual Activity    Alcohol use: Not Currently     Comment: rarely    Drug use: Not Currently     Comment: quit 20 years ago    Sexual activity: Yes     Partners: Female     Birth control/protection: None     Comment: " "steady girlfriend   Other Topics Concern    None   Social History Narrative     from spouse, technically still  but living with other partner, partner's father, and early 9/2019, his partner's daughter and boyfriend moved in with their two children.  Then the boyfriend moved out in 9/2019.   Then partner's daughter moved out approx 7/2021.  (Pt's partner has guardianship of the grandchildren per Pt).        Children: 1 stepdaughter         Education:    Pt denies any hx of learning disabilities and reached childhood milestones on time as far as he knows.  He wore braces for equinovarus but states he did not suffer delay in walking    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 because \"I was just lazy, didn't do the work.\"    No college    Took some core classes in [employer] and worked as a volunteer  from approx [phone]             Substance Abuse History:     Pt drinks ETOH approx q 3-4 months but denies any h/o ETOH abuse.    Pt tried smoking Crack once in the past and has been smoking THC once q 2-3 months since 12y/o.     He denies other drug use, IVDA, addictions or drug abuse.         Social Determinants of Health     Financial Resource Strain: Low Risk  (4/15/2024)    Overall Financial Resource Strain (CARDIA)     Difficulty of Paying Living Expenses: Not hard at all   Food Insecurity: No Food Insecurity (10/15/2024)    Hunger Vital Sign     Worried About Running Out of Food in the Last Year: Never true     Ran Out of Food in the Last Year: Never true   Transportation Needs: No Transportation Needs (10/15/2024)    PRAPARE - Transportation     Lack of Transportation (Medical): No     Lack of Transportation (Non-Medical): No   Physical Activity: Inactive (9/14/2021)    Exercise Vital Sign     Days of Exercise per Week: 0 days     Minutes of Exercise per Session: 0 min   Stress: No Stress Concern Present (9/14/2021)    [place] [employer] - Occupational " Stress Questionnaire     Feeling of Stress : Not at all   Social Connections: Moderately Isolated (9/14/2021)    Social Connection and Isolation Panel [NHANES]     Frequency of Communication with Friends and Family: More than three times a week     Frequency of Social Gatherings with Friends and Family: Once a week     Attends Anglican Services: Never     Active Member of Clubs or Organizations: No     Attends Club or Organization Meetings: Never     Marital Status: Living with partner   Intimate Partner Violence: Not At Risk (9/14/2021)    Humiliation, Afraid, Rape, and Kick questionnaire     Fear of Current or Ex-Partner: No     Emotionally Abused: No     Physically Abused: No     Sexually Abused: No   Housing Stability: Low Risk  (10/15/2024)    Housing Stability Vital Sign     Unable to Pay for Housing in the Last Year: No     Number of Times Moved in the Last Year: 0     Homeless in the Last Year: No       Physical Examination:   Temp:  [97.5 °F (36.4 °C)-98.2 °F (36.8 °C)] 98 °F (36.7 °C)  HR:  [] 75  BP: (132-188)/() 166/90  Resp:  [12-44] 20  SpO2:  [96 %-100 %] 100 %  O2 Device: None (Room air)    General: Awake, alert in NAD  HENT: NCAT, MMM  Respiratory: Unlabored breathing  Cardiovascular: Regular rate   Gastrointestinal: Soft, obese, somewhat distended nontender  Genitourinary: No amezquita  SkiN/MSK/Extremities:  Obese   Neurologic/Psych:   MENTAL STATUS: awake, oriented to person, place, time, and situation  Affect: Euthymic   CN II-XII: R facial weakness  Strength/MMT:  RUE mute except trace EF and shoulder abd, RLE prox 2/5 distal 4/5, L side 5/5     Data:  Lab Results   Component Value Date    HGB 10.8 (L) 10/16/2024    HGB 12.1 12/15/2015    HCT 32.8 (L) 10/16/2024    HCT 35.6 (L) 12/15/2015    WBC 7.27 10/16/2024    WBC 6.44 12/15/2015     12/15/2015    K 4.1 10/16/2024    K Unable to analyze due to hemolysis 12/15/2015     (H) 10/16/2024     12/15/2015    GLUCOSE 315  (H) 12/15/2015    CREATININE 3.27 (H) 10/16/2024    CREATININE 1.16 12/15/2015    BUN 27 (H) 10/16/2024    BUN 6 12/15/2015         MRI brain wo contrast    Result Date: 10/16/2024  Impression: Acute left corona radiata infarct. Other nonemergent findings above. Workstation performed: [id number]     CT head wo contrast    Result Date: 10/16/2024  Impression: No acute intracranial hemorrhage. No significant interval change. Consider MRI with diffusion weighted imaging for further evaluation, if there are no contraindications. Workstation performed: [id number]     CT head without contrast    Result Date: 10/15/2024  Impression: No acute intracranial abnormality. Workstation performed: [id number]     CT stroke alert brain    Result Date: 10/14/2024  Impression: No acute intracranial abnormality.  Stable chronic microangiopathic changes within the brain. Findings were directly discussed with Renae Albert at 11:18 p.m on 10/14/2024. Workstation performed: [id number]     CTA stroke alert (head/neck)    Result Date: 10/14/2024  Impression: Unremarkable CTA neck and brain. Findings were directly discussed with Renae Albert at 11:18 p.m on 10/14/2024. Workstation performed: [id number]     Colonoscopy    Result Date: 10/14/2024  Impression: Incomplete procedure due to inadequate bowel prep.  Solid balls of stool mixed with thick brown liquid noted in the rectum therefore the exam was terminated. RECOMMENDATION: Repeat colonoscopy in 3 months, due: 1/12/2025 Inadequate bowel preparation Incomplete procedure  Recommend 2-day bowel prep for future procedure. Trulicity will need to be held prior to the procedure. Will need to avoid fibrous materials 5 days prior to repeat colonoscopy.  Ailyn Steen MD     EGD    Result Date: 10/14/2024  Impression: Grade B esophagitis in the lower third of the esophagus Irregular Z-line; performed cold forceps biopsy The duodenal bulb, 1st part of the duodenum and 2nd part of the duodenum  appeared normal. Performed random biopsy to rule out celiac disease. Mild erythematous mucosa in the prepyloric region; performed cold forceps biopsy 2 cm type I hiatal hernia RECOMMENDATION: Await pathology results Follow biopsy results in 2 weeks. Recommend starting pantoprazole 40 mg on daily basis and continue this indefinitely if biopsies confirm Tolbert's esophagus. Avoid NSAIDs. Follow GERD diet.   Ailyn Steen MD       Pertinent labs and imaging reviewed.      Patient seen on date of service listed.    Thank you for allowing the PM&R service to participate in the care of this patient. We will continue to follow Tommie Taylor's progress with you. Please do not hesitate to call with questions or concerns.    Luis Nielson MD, MS  [facility]  Physical Medicine and Rehabilitation  Brain Injury Medicine

## 2024-10-16 NOTE — MALNUTRITION/BMI
BMI Findings:  Adult BMI Classifications: Morbid Obesity 40-44.9        Body mass index is 44.59 kg/m².     See Nutrition note dated 10/16/2024  for additional details.  Completed nutrition assessment is viewable in the nutrition documentation.

## 2024-10-16 NOTE — QUICK NOTE
Attempted to call and verify patient's at-home medication list but was unable to confirm list with patient's significant other. Patient states he is unsure which medications he takes at home.

## 2024-10-16 NOTE — PLAN OF CARE
Problem: PAIN - ADULT  Goal: Verbalizes/displays adequate comfort level or baseline comfort level  Description: Interventions:  - Encourage patient to monitor pain and request assistance  - Assess pain using appropriate pain scale  - Administer analgesics based on type and severity of pain and evaluate response  - Implement non-pharmacological measures as appropriate and evaluate response  - Consider cultural and social influences on pain and pain management  - Notify physician/advanced practitioner if interventions unsuccessful or patient reports new pain  Outcome: Progressing     Problem: INFECTION - ADULT  Goal: Absence or prevention of progression during hospitalization  Description: INTERVENTIONS:  - Assess and monitor for signs and symptoms of infection  - Monitor lab/diagnostic results  - Monitor all insertion sites, i.e. indwelling lines, tubes, and drains  - Monitor endotracheal if appropriate and nasal secretions for changes in amount and color  - [place] appropriate cooling/warming therapies per order  - Administer medications as ordered  - Instruct and encourage patient and family to use good hand hygiene technique  - Identify and instruct in appropriate isolation precautions for identified infection/condition  Outcome: Progressing  Goal: Absence of fever/infection during neutropenic period  Description: INTERVENTIONS:  - Monitor WBC    Outcome: Progressing     Problem: SAFETY ADULT  Goal: Patient will remain free of falls  Description: INTERVENTIONS:  - Educate patient/family on patient safety including physical limitations  - Instruct patient to call for assistance with activity   - Consult OT/PT to assist with strengthening/mobility   - Keep Call bell within reach  - Keep bed low and locked with side rails adjusted as appropriate  - Keep care items and personal belongings within reach  - Initiate and maintain comfort rounds  - Make Fall Risk Sign visible to staff  - Offer Toileting every 2 Hours,  in advance of need  - Initiate/Maintain bed alarm  - Obtain necessary fall risk management equipment  - Apply yellow socks and bracelet for high fall risk patients  - Consider moving patient to room near nurses station  Outcome: Progressing  Goal: Maintain or return to baseline ADL function  Description: INTERVENTIONS:  -  Assess patient's ability to carry out ADLs; assess patient's baseline for ADL function and identify physical deficits which impact ability to perform ADLs (bathing, care of mouth/teeth, toileting, grooming, dressing, etc.)  - Assess/evaluate cause of self-care deficits   - Assess range of motion  - Assess patient's mobility; develop plan if impaired  - Assess patient's need for assistive devices and provide as appropriate  - Encourage maximum independence but intervene and supervise when necessary  - Involve family in performance of ADLs  - Assess for home care needs following discharge   - Consider OT consult to assist with ADL evaluation and planning for discharge  - Provide patient education as appropriate  Outcome: Progressing  Goal: Maintains/Returns to pre admission functional level  Description: INTERVENTIONS:  - Perform AM-PAC 6 Click Basic Mobility/ Daily Activity assessment daily.  - Set and communicate daily mobility goal to care team and patient/family/caregiver.   - Collaborate with rehabilitation services on mobility goals if consulted  - Perform Range of Motion 3 times a day.  - Reposition patient every 2 hours.  - Dangle patient 3 times a day  - Stand patient 3 times a day  - Ambulate patient 3 times a day  - Out of bed to chair 3 times a day   - Out of bed for meals 3 times a day  - Out of bed for toileting  - Record patient progress and toleration of activity level   Outcome: Progressing     Problem: DISCHARGE PLANNING  Goal: Discharge to home or other facility with appropriate resources  Description: INTERVENTIONS:  - Identify barriers to discharge w/patient and caregiver  -  Arrange for needed discharge resources and transportation as appropriate  - Identify discharge learning needs (meds, wound care, etc.)  - Arrange for interpretive services to assist at discharge as needed  - Refer to Case Management Department for coordinating discharge planning if the patient needs post-hospital services based on physician/advanced practitioner order or complex needs related to functional status, cognitive ability, or social support system  Outcome: Progressing     Problem: Knowledge Deficit  Goal: Patient/family/caregiver demonstrates understanding of disease process, treatment plan, medications, and discharge instructions  Description: Complete learning assessment and assess knowledge base.  Interventions:  - Provide teaching at level of understanding  - Provide teaching via preferred learning methods  Outcome: Progressing     Problem: Neurological Deficit  Goal: Neurological status is stable or improving  Description: Interventions:  - Monitor and assess patient's level of consciousness, motor function, sensory function, and level of assistance needed for ADLs.   - Monitor and report changes from baseline. Collaborate with interdisciplinary team to initiate plan and implement interventions as ordered.   - Provide and maintain a safe environment.  - Consider seizure precautions.  - Consider fall precautions.  - Consider aspiration precautions.  - Consider bleeding precautions.  Outcome: Progressing     Problem: Communication Impairment  Goal: Ability to express needs and understand communication  Description: Assess patient's communication skills and ability to understand information.  Patient will demonstrate use of effective communication techniques, alternative methods of communication and understanding even if not able to speak.     - Encourage communication and provide alternate methods of communication as needed.  - Collaborate with case management/ for discharge needs.  -  Include patient/family/caregiver in decisions related to communication.  Outcome: Progressing     Problem: Activity Intolerance/Impaired Mobility  Goal: Mobility/activity is maintained at optimum level for patient  Description: Interventions:  - Assess and monitor patient  barriers to mobility and need for assistive/adaptive devices.  - Assess patient's emotional response to limitations.  - Collaborate with interdisciplinary team and initiate plans and interventions as ordered.  - Encourage independent activity per ability.  - Maintain proper body alignment.  - Perform active/passive rom as tolerated/ordered.  - Plan activities to conserve energy.  - Turn patient as appropriate  Outcome: Progressing     Problem: Potential for Aspiration  Goal: Non-ventilated patient's risk of aspiration is minimized  Description: Assess and monitor vital signs, respiratory status, and labs (WBC).  Monitor for signs of aspiration (tachypnea, cough, rales, wheezing, cyanosis, fever).    - Assess and monitor patient's ability to swallow.  - Place patient up in chair to eat if possible.  - HOB up at 90 degrees to eat if unable to get patient up into chair.  - Supervise patient during oral intake.   - Instruct patient/ family to take small bites.  - Instruct patient/ family to take small single sips when taking liquids.  - Follow patient-specific strategies generated by speech pathologist.  Outcome: Progressing  Goal: Ventilated patient's risk of aspiration is minimized  Description: Assess and monitor vital signs, respiratory status, airway cuff pressure, and labs (WBC).  Monitor for signs of aspiration (tachypnea, cough, rales, wheezing, cyanosis, fever).    - Elevate head of bed 30 degrees if patient has tube feeding.  - Monitor tube feeding.  Outcome: Progressing     Problem: Nutrition  Goal: Nutrition/Hydration status is improving  Description: Monitor and assess patient's nutrition/hydration status for malnutrition (ex- brittle  hair, bruises, dry skin, pale skin and conjunctiva, muscle wasting, smooth red tongue, and disorientation). Collaborate with interdisciplinary team and initiate plan and interventions as ordered.  Monitor patient's weight and dietary intake as ordered or per policy. Utilize nutrition screening tool and intervene per policy. Determine patient's food preferences and provide high-protein, high-caloric foods as appropriate.     - Assist patient with eating.  - Allow adequate time for meals.  - Encourage patient to take dietary supplement as ordered.  - Collaborate with clinical nutritionist.  - Include patient/family/caregiver in decisions related to nutrition.  Outcome: Progressing

## 2024-10-17 ENCOUNTER — APPOINTMENT (INPATIENT)
Dept: RADIOLOGY | Facility: HOSPITAL | Age: 59
DRG: 061 | End: 2024-10-17
Payer: MEDICARE

## 2024-10-17 ENCOUNTER — APPOINTMENT (INPATIENT)
Dept: CT IMAGING | Facility: HOSPITAL | Age: 59
DRG: 061 | End: 2024-10-17
Payer: MEDICARE

## 2024-10-17 PROBLEM — E87.3 RESPIRATORY ALKALOSIS: Status: ACTIVE | Noted: 2024-10-17

## 2024-10-17 PROBLEM — E87.70 VOLUME OVERLOAD: Status: ACTIVE | Noted: 2024-10-17

## 2024-10-17 PROBLEM — N17.9 AKI (ACUTE KIDNEY INJURY) (HCC): Status: ACTIVE | Noted: 2024-10-17

## 2024-10-17 PROBLEM — G93.40 ENCEPHALOPATHY: Status: ACTIVE | Noted: 2024-10-17

## 2024-10-17 PROBLEM — E87.4 ACID-BASE DISORDER, MIXED: Status: ACTIVE | Noted: 2024-10-17

## 2024-10-17 LAB
ALBUMIN SERPL BCG-MCNC: 3 G/DL (ref 3.5–5)
ALP SERPL-CCNC: 69 U/L (ref 34–104)
ALT SERPL W P-5'-P-CCNC: 9 U/L (ref 7–52)
AMMONIA PLAS-SCNC: 17 UMOL/L (ref 18–72)
ANION GAP SERPL CALCULATED.3IONS-SCNC: 8 MMOL/L (ref 4–13)
APTT PPP: 29 SECONDS (ref 23–34)
AST SERPL W P-5'-P-CCNC: 21 U/L (ref 13–39)
BACTERIA UR QL AUTO: ABNORMAL /HPF
BASE EX.OXY STD BLDV CALC-SCNC: 93.5 % (ref 60–80)
BASE EXCESS BLDV CALC-SCNC: -2.9 MMOL/L
BILIRUB SERPL-MCNC: 0.43 MG/DL (ref 0.2–1)
BILIRUB UR QL STRIP: NEGATIVE
BUN SERPL-MCNC: 26 MG/DL (ref 5–25)
CA-I BLD-SCNC: 1.11 MMOL/L (ref 1.12–1.32)
CA-I BLD-SCNC: 1.15 MMOL/L (ref 1.12–1.32)
CALCIUM ALBUM COR SERPL-MCNC: 9.5 MG/DL (ref 8.3–10.1)
CALCIUM SERPL-MCNC: 8.7 MG/DL (ref 8.4–10.2)
CHLORIDE SERPL-SCNC: 112 MMOL/L (ref 96–108)
CLARITY UR: CLEAR
CO2 SERPL-SCNC: 18 MMOL/L (ref 21–32)
COLOR UR: ABNORMAL
CREAT SERPL-MCNC: 3.52 MG/DL (ref 0.6–1.3)
ERYTHROCYTE [DISTWIDTH] IN BLOOD BY AUTOMATED COUNT: 14.6 % (ref 11.6–15.1)
GFR SERPL CREATININE-BSD FRML MDRD: 18 ML/MIN/1.73SQ M
GLUCOSE SERPL-MCNC: 140 MG/DL (ref 65–140)
GLUCOSE SERPL-MCNC: 141 MG/DL (ref 65–140)
GLUCOSE SERPL-MCNC: 149 MG/DL (ref 65–140)
GLUCOSE SERPL-MCNC: 152 MG/DL (ref 65–140)
GLUCOSE SERPL-MCNC: 174 MG/DL (ref 65–140)
GLUCOSE UR STRIP-MCNC: ABNORMAL MG/DL
HCO3 BLDV-SCNC: 20.1 MMOL/L (ref 24–30)
HCT VFR BLD AUTO: 32.7 % (ref 36.5–49.3)
HGB BLD-MCNC: 10.9 G/DL (ref 12–17)
HGB UR QL STRIP.AUTO: ABNORMAL
INR PPP: 1.13 (ref 0.85–1.19)
KETONES UR STRIP-MCNC: NEGATIVE MG/DL
LACTATE SERPL-SCNC: 0.8 MMOL/L (ref 0.5–2)
LEUKOCYTE ESTERASE UR QL STRIP: NEGATIVE
MAGNESIUM SERPL-MCNC: 2.1 MG/DL (ref 1.9–2.7)
MCH RBC QN AUTO: 26.1 PG (ref 26.8–34.3)
MCHC RBC AUTO-ENTMCNC: 33.3 G/DL (ref 31.4–37.4)
MCV RBC AUTO: 78 FL (ref 82–98)
NITRITE UR QL STRIP: NEGATIVE
NON-SQ EPI CELLS URNS QL MICRO: ABNORMAL /HPF
O2 CT BLDV-SCNC: 15.2 ML/DL
PCO2 BLDV: 29.2 MM HG (ref 42–50)
PH BLDV: 7.46 [PH] (ref 7.3–7.4)
PH UR STRIP.AUTO: 6.5 [PH]
PHOSPHATE SERPL-MCNC: 3.8 MG/DL (ref 2.7–4.5)
PLATELET # BLD AUTO: 194 THOUSANDS/UL (ref 149–390)
PMV BLD AUTO: 9.1 FL (ref 8.9–12.7)
PO2 BLDV: 78 MM HG (ref 35–45)
POTASSIUM SERPL-SCNC: 5.1 MMOL/L (ref 3.5–5.3)
PROCALCITONIN SERPL-MCNC: 0.2 NG/ML
PROT SERPL-MCNC: 6 G/DL (ref 6.4–8.4)
PROT UR STRIP-MCNC: ABNORMAL MG/DL
PROTHROMBIN TIME: 15.2 SECONDS (ref 12.3–15)
RBC # BLD AUTO: 4.17 MILLION/UL (ref 3.88–5.62)
RBC #/AREA URNS AUTO: ABNORMAL /HPF
SODIUM SERPL-SCNC: 138 MMOL/L (ref 135–147)
SP GR UR STRIP.AUTO: 1.01 (ref 1–1.03)
UROBILINOGEN UR STRIP-ACNC: <2 MG/DL
WBC # BLD AUTO: 8.35 THOUSAND/UL (ref 4.31–10.16)
WBC #/AREA URNS AUTO: ABNORMAL /HPF

## 2024-10-17 PROCEDURE — 84100 ASSAY OF PHOSPHORUS: CPT | Performed by: STUDENT IN AN ORGANIZED HEALTH CARE EDUCATION/TRAINING PROGRAM

## 2024-10-17 PROCEDURE — 82330 ASSAY OF CALCIUM: CPT | Performed by: STUDENT IN AN ORGANIZED HEALTH CARE EDUCATION/TRAINING PROGRAM

## 2024-10-17 PROCEDURE — 81001 URINALYSIS AUTO W/SCOPE: CPT

## 2024-10-17 PROCEDURE — 83735 ASSAY OF MAGNESIUM: CPT | Performed by: STUDENT IN AN ORGANIZED HEALTH CARE EDUCATION/TRAINING PROGRAM

## 2024-10-17 PROCEDURE — 85610 PROTHROMBIN TIME: CPT | Performed by: STUDENT IN AN ORGANIZED HEALTH CARE EDUCATION/TRAINING PROGRAM

## 2024-10-17 PROCEDURE — 97535 SELF CARE MNGMENT TRAINING: CPT

## 2024-10-17 PROCEDURE — 85730 THROMBOPLASTIN TIME PARTIAL: CPT | Performed by: STUDENT IN AN ORGANIZED HEALTH CARE EDUCATION/TRAINING PROGRAM

## 2024-10-17 PROCEDURE — 80053 COMPREHEN METABOLIC PANEL: CPT | Performed by: STUDENT IN AN ORGANIZED HEALTH CARE EDUCATION/TRAINING PROGRAM

## 2024-10-17 PROCEDURE — 71045 X-RAY EXAM CHEST 1 VIEW: CPT

## 2024-10-17 PROCEDURE — 97530 THERAPEUTIC ACTIVITIES: CPT

## 2024-10-17 PROCEDURE — 88305 TISSUE EXAM BY PATHOLOGIST: CPT | Performed by: PATHOLOGY

## 2024-10-17 PROCEDURE — 97110 THERAPEUTIC EXERCISES: CPT

## 2024-10-17 PROCEDURE — 82140 ASSAY OF AMMONIA: CPT

## 2024-10-17 PROCEDURE — 82805 BLOOD GASES W/O2 SATURATION: CPT

## 2024-10-17 PROCEDURE — 85027 COMPLETE CBC AUTOMATED: CPT | Performed by: STUDENT IN AN ORGANIZED HEALTH CARE EDUCATION/TRAINING PROGRAM

## 2024-10-17 PROCEDURE — 87077 CULTURE AEROBIC IDENTIFY: CPT

## 2024-10-17 PROCEDURE — 82948 REAGENT STRIP/BLOOD GLUCOSE: CPT

## 2024-10-17 PROCEDURE — 99223 1ST HOSP IP/OBS HIGH 75: CPT | Performed by: STUDENT IN AN ORGANIZED HEALTH CARE EDUCATION/TRAINING PROGRAM

## 2024-10-17 PROCEDURE — 84145 PROCALCITONIN (PCT): CPT

## 2024-10-17 PROCEDURE — 83605 ASSAY OF LACTIC ACID: CPT

## 2024-10-17 PROCEDURE — 87040 BLOOD CULTURE FOR BACTERIA: CPT

## 2024-10-17 PROCEDURE — 70450 CT HEAD/BRAIN W/O DYE: CPT

## 2024-10-17 PROCEDURE — 87154 CUL TYP ID BLD PTHGN 6+ TRGT: CPT

## 2024-10-17 PROCEDURE — 99232 SBSQ HOSP IP/OBS MODERATE 35: CPT

## 2024-10-17 PROCEDURE — 94760 N-INVAS EAR/PLS OXIMETRY 1: CPT

## 2024-10-17 RX ORDER — ACETAMINOPHEN 325 MG/1
650 TABLET ORAL EVERY 6 HOURS PRN
Status: DISCONTINUED | OUTPATIENT
Start: 2024-10-17 | End: 2024-10-17

## 2024-10-17 RX ORDER — NIFEDIPINE 30 MG/1
60 TABLET, EXTENDED RELEASE ORAL DAILY
Status: DISCONTINUED | OUTPATIENT
Start: 2024-10-18 | End: 2024-10-18

## 2024-10-17 RX ORDER — TORSEMIDE 10 MG/1
10 TABLET ORAL DAILY
Status: DISCONTINUED | OUTPATIENT
Start: 2024-10-17 | End: 2024-10-18

## 2024-10-17 RX ORDER — ACETAMINOPHEN 325 MG/1
650 TABLET ORAL EVERY 6 HOURS PRN
Status: DISCONTINUED | OUTPATIENT
Start: 2024-10-17 | End: 2024-10-26 | Stop reason: HOSPADM

## 2024-10-17 RX ADMIN — VALBENAZINE 40 MG: 40 CAPSULE ORAL at 21:24

## 2024-10-17 RX ADMIN — CHLORHEXIDINE GLUCONATE 15 ML: 1.2 RINSE ORAL at 21:24

## 2024-10-17 RX ADMIN — ACETAMINOPHEN 650 MG: 325 TABLET ORAL at 08:04

## 2024-10-17 RX ADMIN — HEPARIN SODIUM 7500 UNITS: 5000 INJECTION INTRAVENOUS; SUBCUTANEOUS at 05:01

## 2024-10-17 RX ADMIN — ACETAMINOPHEN 650 MG: 325 TABLET ORAL at 14:22

## 2024-10-17 RX ADMIN — HYDRALAZINE HYDROCHLORIDE 10 MG: 20 INJECTION, SOLUTION INTRAMUSCULAR; INTRAVENOUS at 07:59

## 2024-10-17 RX ADMIN — OLANZAPINE 10 MG: 10 TABLET, FILM COATED ORAL at 21:24

## 2024-10-17 RX ADMIN — AMLODIPINE BESYLATE 10 MG: 10 TABLET ORAL at 08:00

## 2024-10-17 RX ADMIN — CLOPIDOGREL BISULFATE 75 MG: 75 TABLET ORAL at 08:00

## 2024-10-17 RX ADMIN — ATORVASTATIN CALCIUM 40 MG: 40 TABLET, FILM COATED ORAL at 16:57

## 2024-10-17 RX ADMIN — BUPROPION HYDROCHLORIDE TABLETS 100 MG: 100 TABLET, FILM COATED ORAL at 16:59

## 2024-10-17 RX ADMIN — HEPARIN SODIUM 7500 UNITS: 5000 INJECTION INTRAVENOUS; SUBCUTANEOUS at 21:22

## 2024-10-17 RX ADMIN — TORSEMIDE 10 MG: 10 TABLET ORAL at 13:21

## 2024-10-17 RX ADMIN — LABETALOL HYDROCHLORIDE 100 MG: 100 TABLET, FILM COATED ORAL at 08:01

## 2024-10-17 RX ADMIN — ASPIRIN 81 MG: 81 TABLET, COATED ORAL at 08:01

## 2024-10-17 RX ADMIN — HEPARIN SODIUM 7500 UNITS: 5000 INJECTION INTRAVENOUS; SUBCUTANEOUS at 13:18

## 2024-10-17 RX ADMIN — LABETALOL HYDROCHLORIDE 100 MG: 100 TABLET, FILM COATED ORAL at 16:59

## 2024-10-17 RX ADMIN — CHLORHEXIDINE GLUCONATE 15 ML: 1.2 RINSE ORAL at 08:00

## 2024-10-17 RX ADMIN — BUPROPION HYDROCHLORIDE TABLETS 100 MG: 100 TABLET, FILM COATED ORAL at 08:00

## 2024-10-17 RX ADMIN — HYDRALAZINE HYDROCHLORIDE 10 MG: 20 INJECTION, SOLUTION INTRAMUSCULAR; INTRAVENOUS at 14:30

## 2024-10-17 NOTE — ASSESSMENT & PLAN NOTE
Lab Results   Component Value Date    HGBA1C 8.6 (H) 10/16/2024       Recent Labs     10/16/24  1122 10/16/24  1729 10/17/24  0745 10/17/24  1045   POCGLU 232* 118 141* 140       Blood Sugar Average: Last 72 hrs:  (P) 161.875    Currently maintained on semaglutide monotherapy - hold on admission   Continue sliding scale   Continue to monitor and adjust regimen as needed

## 2024-10-17 NOTE — SPEECH THERAPY NOTE
Speech Language/Pathology    Speech/Language Pathology Cancel Note    Patient Name: Tommie Taylor  Today's Date: 10/17/2024       Per OT/PT, pt w/ worsening neuro symptoms, attempted to see for dysphagia tx follow up at breakfast. Spoke w/ RN and AP, stat CT ordered.  Recommending holding po at this time  due to increased lethargy.   Will  follow up as able, as appropriate.       Mya Watson MA CCC-SLP  Speech Pathologist  Available via [employer]

## 2024-10-17 NOTE — PLAN OF CARE
Problem: OCCUPATIONAL THERAPY ADULT  Goal: Performs self-care activities at highest level of function for planned discharge setting.  See evaluation for individualized goals.  Description: Treatment Interventions: ADL retraining, Functional transfer training, UE strengthening/ROM, Endurance training, Cognitive reorientation, Patient/family training, Equipment evaluation/education, Neuromuscular reeducation, Fine motor coordination activities, Compensatory technique education, Continued evaluation, Energy conservation, Activityengagement  Equipment Recommended: Bedside commode       See flowsheet documentation for full assessment, interventions and recommendations.   Outcome: Progressing  Note: Limitation: Decreased ADL status, Decreased UE ROM, Decreased UE strength, Decreased Safe judgement during ADL, Decreased fine motor control, Decreased self-care trans, Decreased high-level ADLs (R estefania-paresis, motor planning, safety awareness, balance, activity tolerance)  Prognosis: Fair  Assessment: Pt seen on this date for skilled OT treatment session. At start of session pt supine in bed, pt noted with increased lethargy and requiring increased (A) with bed mobility. Able to sit EOB - continues to have fluctuating sitting balance due to R lateral leaning - requiring increased (A) for correction. Attempting x2 R lateral forearm weightbearing - requiring max A x1 for recovery. Poor attention during completion of ADL tasks requiring repetitive cuing and intermittent hand over hand assist with LUE to initiate bathing/dressing tasks. Pt with reports of dizziness sitting EOB limiting participation. Elevated HR and BP noted - RN made aware. Attempting x2 sit >< stand for repositioning towards HOB - requiring increased (A) and limited ability to advance RLE at this time - requiring angled sit to return to bed and dep repositioning of feet for 2nd trial to achieve positioning towards HOB. Pt returned to supine and BP noted to  improve. Edu provided on RUE PROM exercises and safe positioning in bed/sitting. Pt noted with increased alertness in bed and improved cognition and reporting understanding. Pt would continue to benefit from skilled OT treatment sessions in order to address remaining deficits     Rehab Resource Intensity Level, OT: I (Maximum Resource Intensity)

## 2024-10-17 NOTE — ASSESSMENT & PLAN NOTE
POA BP in 200s  Started on nicardipine drip on admission to allow BP and wean to get TNK  Home regimen: Amlodipine 10 mg daily and labetalol 100 mg twice daily  Continue current regimen, plan to transition to nifedipine tomorrow and can increase labetalol as mentioned above in [facility]

## 2024-10-17 NOTE — ASSESSMENT & PLAN NOTE
Mixed respiratory alkalosis and metabolic acidosis  VBG: pH 7.45, pCO2 29.2, serum HCO3 18  No active medical intervention needed

## 2024-10-17 NOTE — ASSESSMENT & PLAN NOTE
Patient presented with right upper and right lower extremity weakness with associated right facial droop, highly suggestive of CVA  CT/CTA head unremarkable for acute process  TnK administered following correction of hypertension with nicardipine drip in the ICU  Following thrombolytic administration patient developed worsening slurred speech and headache, had repeat CT head which was unremarkable  Repeat head CT at 24 hours post TNK negative for signs of bleed  Neurology consult  MRI significant for left corona radiata stroke.  Placed on DAPT therapy for 21 days and then continue aspirin monotherapy indefinitely per neurology.  Started on Lipitor 40 mg per stroke protocol  Recommend Zio patch outpatient to evaluate for any possible arrhythmia  Will need f/u with neurovascular team in 6-8 weeks   10/17 patient with worsening lethargy, alert and oriented x 2.  STAT CT head unremarkable - see encephalopathy for further plan   PT/OT recommending level 1

## 2024-10-17 NOTE — ASSESSMENT & PLAN NOTE
Examination showed bilateral lower extremity pitting edema  Echo on 10/15 showed normal cavity size, moderately increased wall thickness, moderate concentric hypertrophy, EF of 70%, G2 DD  Added torsemide 10 mg daily

## 2024-10-17 NOTE — ASSESSMENT & PLAN NOTE
Continue home dose PPI   Is Phototherapy Contraindicated?: No Pregnancy And Lactation Warning Text: This medication is Pregnancy Category B and is considered safe during pregnancy. It is unknown if this medication is excreted in breast milk. Detail Level: Zone Humira Monitoring Guidelines: A yearly test for tuberculosis is required while taking Humira. Diagnosis (Required): Psoriasis Humira Dosing: 80 mg SC day 0, 40 mg SC day 7, then 40 mg SC every other week

## 2024-10-17 NOTE — ASSESSMENT & PLAN NOTE
Lab Results   Component Value Date    EGFR 18 10/17/2024    EGFR 19 10/16/2024    EGFR 21 10/15/2024    CREATININE 3.52 (H) 10/17/2024    CREATININE 3.27 (H) 10/16/2024    CREATININE 3.08 (H) 10/15/2024   Baseline CR: From high 2s to low 3s  OP Nephrology: Dr Bal

## 2024-10-17 NOTE — ASSESSMENT & PLAN NOTE
Patient presented with right upper and right lower extremity weakness with associated right facial droop, highly suggestive of CVA.  CT/CTA head unremarkable for acute intracranial abnormality, s/p TNK following correction of hypertension with nicardipine drip  MRI significant for left corona radiator stroke, per neurology placed on DAPT therapy for 21 days then aspirin monotherapy indefinitely, on Lipitor 40 mg per stroke protocol  Neurology recommended Zio-patch as OP

## 2024-10-17 NOTE — OCCUPATIONAL THERAPY NOTE
"  Occupational Therapy Progress Note     Patient Name: Tommie Taylor  Today's Date: 10/17/2024  Problem List  Principal Problem:    CVA (cerebral vascular accident) ([facility])  Active Problems:    Bipolar I disorder, severe, current or most recent episode depressed, with psychotic features ([facility])    GERD (gastroesophageal reflux disease)    Hyperlipidemia    Tardive dyskinesia    Hypertensive emergency    Type 2 diabetes mellitus with hyperglycemia, without long-term current use of insulin (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)            10/17/24 0816 10/17/24 0827   Vital Signs   Pulse (!) 123 (!) 114   Blood Pressure (!) 152/108 150/94   MAP (mmHg) 123 113   BP Location Left arm Left arm   BP Method Automatic Automatic   Patient Position - Orthostatic VS Sitting Lying        10/17/24 0835   OT Last Visit   OT Visit Date 10/17/24   Note Type   Note Type Treatment   Pain Assessment   Pain Assessment Tool 0-10   Pain Score No Pain   Restrictions/Precautions   Weight Bearing Precautions Per Order No   Other Precautions Impulsive;Cognitive;Chair Alarm;Bed Alarm;Multiple lines  (+R sided hemiparesis)   Lifestyle   Autonomy Pt lives w/ family in a 2SH. Independent with ADLs, most IADLs and functional mobility w/o use of AD. (+) falls and (-) driving   Reciprocal Relationships Supportive family   Service to Others Retired working in [employer]   Intrinsic Gratification \"Busy working around the house\"   ADL   Grooming Assistance 4  Minimal Assistance   Grooming Deficit Increased time to complete;Supervision/safety;Verbal cueing;Wash/dry face   Grooming Comments cuing for washing face - use of LUE only   UB Bathing Assistance 3  Moderate Assistance   UB Bathing Deficit Increased time to complete;Supervision/safety;Verbal cueing;Chest;Right arm;Left arm;Steadying   UB Bathing Comments MAXIMAL cuing for initiating task and follow through of task- dropping wash " cloth with LUE despite cuing for washing. Crossing midline appropriately to return washcloth to basin   LB Bathing Assistance 2  Maximal Assistance   LB Bathing Deficit Increased time to complete;Perineal area;Buttocks;Right upper leg;Left upper leg;Right lower leg including foot;Left lower leg including foot   LB Bathing Comments Attempting to wash L thigh - A for thoroughness. A for remainder of LB dressing due to increased fatigue and poor attention to task   UB Dressing Assistance 2  Maximal Assistance   UB Dressing Deficit Increased time to complete;Supervision/safety;Verbal cueing;Thread RUE;Thread LUE;Pull around back   UB Dressing Comments cuing for removing B sleeves and hand placement to complete. Attempted edu on one handed techniques with poor attention to task   Bed Mobility   Supine to Sit 2  Maximal assistance   Additional items Assist x 1;Increased time required;Verbal cues;LE management   Sit to Supine 2  Maximal assistance   Additional items Assist x 2;Increased time required;Verbal cues;LE management   Additional Comments Sitting EOB with R lateral leaning sitting balance with min-mod A x1 - requiring cuing for correction  (+ reports of dizziness sitting EOB see BPs above)   Transfers   Sit to Stand 2  Maximal assistance   Additional items Assist x 2;Increased time required;Verbal cues   Stand to Sit 2  Maximal assistance   Additional items Assist x 2;Increased time required;Verbal cues   Additional Comments B HHA with B knee block - noted buckling at R knee   Functional Mobility   Additional Comments unable to advance - R foot not advancing despite encouragement + tactile stimulation. Pt does attempt to move L foot - leading to imbalance and pt requiring to return to sitting on EOB   Neuromuscular Education   Weight Bearing Technique Yes   RUE Weight Bearing Forearm seated   Response to Weight Bearing Technique requiring max A for recovery from weight bearing positioning   Functional Movement  "Patterns edu on LUE assisted PROM of RUE- shoulder elevation, upwards reaches, \"rocking the baby\"   Subjective   Subjective \"I don't feel too good\"   Cognition   Overall Cognitive Status Impaired   Arousal/Participation Cooperative;Lethargic   Attention Attends with cues to redirect   Orientation Level Oriented to person;Oriented to place;Disoriented to time;Disoriented to situation   Memory Decreased short term memory;Decreased recall of recent events;Decreased recall of precautions   Following Commands Follows one step commands with increased time or repetition   Comments POOR attention requiring extensive cuing for attention to tasks, limited problem solving. Pt becoming easily frustrated regarding current situation - benefitting from therapeutic listening and encouragement. Pt continues to be highly motivated to participate in therapies   Activity Tolerance   Activity Tolerance Patient tolerated treatment well;Treatment limited secondary to medical complications (Comment)  (limited by elevated BP + HR: see above. RN made aware)   Medical Staff Made Aware JERSON Moore - partial co-treatment with JERSON Mcmahon due to increased need for (A) during functional transfers   Assessment   Assessment Pt seen on this date for skilled OT treatment session. At start of session pt supine in bed, pt noted with increased lethargy and requiring increased (A) with bed mobility. Able to sit EOB - continues to have fluctuating sitting balance due to R lateral leaning - requiring increased (A) for correction. Attempting x2 R lateral forearm weightbearing - requiring max A x1 for recovery. Poor attention during completion of ADL tasks requiring repetitive cuing and intermittent hand over hand assist with LUE to initiate bathing/dressing tasks. Pt with reports of dizziness sitting EOB limiting participation. Elevated HR and BP noted - RN made aware. Attempting x2 sit >< stand for repositioning towards HOB - requiring increased (A) " and limited ability to advance RLE at this time - requiring angled sit to return to bed and dep repositioning of feet for 2nd trial to achieve positioning towards HOB. Pt returned to supine and BP noted to improve. Edu provided on RUE PROM exercises and safe positioning in bed/sitting. Pt noted with increased alertness in bed and improved cognition and reporting understanding. Pt would continue to benefit from skilled OT treatment sessions in order to address remaining deficits   Plan   Goal Expiration Date 10/25/24   OT Treatment Day 1   OT Frequency 3-5x/wk   Discharge Recommendation   Rehab Resource Intensity Level, OT I (Maximum Resource Intensity)   Equipment Recommended Bedside commode   Commode Type Wide   AM-PAC Daily Activity Inpatient   Lower Body Dressing 2   Bathing 2   Toileting 2   Upper Body Dressing 2   Grooming 3   Eating 3   Daily Activity Raw Score 14   Daily Activity Standardized Score (Calc for Raw Score >=11) 33.39   AM-PAC Applied Cognition Inpatient   Following a Speech/Presentation 3   Understanding Ordinary Conversation 3   Taking Medications 2   Remembering Where Things Are Placed or Put Away 2   Remembering List of 4-5 Errands 2   Taking Care of Complicated Tasks 2   Applied Cognition Raw Score 14   Applied Cognition Standardized Score 32.02   End of Consult   Patient Position at End of Consult Supine;Bed/Chair alarm activated;All needs within reach     GOALS:      -Patient will perform grooming tasks sitting at sink vs EOB with overall ASHLEY in order to increase overall independence PROGRESSING     -Patient will be ASHLEY with UB dressing using AE and AD as needed in order to increase (I) with ADLs PROGRESSING     -Patient will be ASHLEY with UB bathing using AE and AD as needed in order to increase (I) with ADLs PROGRESSING     -Patient will be MODA with LB dressing with use of AE and AD as needed in order to increase (I) with ADLs      -Patient will be MODA with LB bathing with use of AE  and AD as needed in order to increase (I) with ADLs PROGRESSING     -Patient will complete toileting w/ MODA w/ G hygiene/thoroughness in order to reduce caregiver burden PROGRESSING     -Patient will demonstrate ASHLEY x 1 with bed mobility for ability to manage own comfort and initiate OOB tasks. MINIMAL PROGRESS     -Patient will perform functional transfers with ASHLEY x 1 to/from all surfaces using DME as needed in order to increase (I) with functional tasks MINIMAL PROGRESS     -Patient will be MODA x 1 with functional mobility to/from bathroom for increased independence with toileting tasks     -Patient will tolerate therapeutic activities for greater than 30 min, in order to increase tolerance for functional activities. PROGRESSING     -Patient will engage in ongoing cognitive assessment in order to assist with safe discharge planning/recommendations.     -Patient will independently integrate one pacing strategy into morning ADL's.     -Patient will demonstrate standing for 3 min in order to increase active participation in functional activities      The patient's raw score on the -PAC Daily Activity Inpatient Short Form is 14. A raw score of less than 19 suggests the patient may benefit from discharge to post-acute rehabilitation services. HOWEVER please refer to the recommendation of the Occupational Therapist for safe discharge planning.    This session, pt required and most appropriately benefited from skilled OT/PT co-treat due to extensive physical assistance of SKILLED therapists, significant regression from functional baseline, and decreased activity tolerance. OT and PT goals were addressed separately as seen in documentation.     Iram Mccurdy MS, OTR/L

## 2024-10-17 NOTE — PROGRESS NOTES
Progress Note - Hospitalist   Name: Tommie Taylor 59 y.o. male I MRN: [id number]  Unit/Bed#: S -01 I Date of Admission: 10/14/2024   Date of Service: 10/17/2024 I Hospital Day: 2    Assessment & Plan  CVA (cerebral vascular accident) (HCC)  Patient presented with right upper and right lower extremity weakness with associated right facial droop, highly suggestive of CVA  CT/CTA head unremarkable for acute process  TnK administered following correction of hypertension with nicardipine drip in the ICU  Following thrombolytic administration patient developed worsening slurred speech and headache, had repeat CT head which was unremarkable  Repeat head CT at 24 hours post TNK negative for signs of bleed  Neurology consult  MRI significant for left corona radiata stroke.  Placed on DAPT therapy for 21 days and then continue aspirin monotherapy indefinitely per neurology.  Started on Lipitor 40 mg per stroke protocol  Recommend Zio patch outpatient to evaluate for any possible arrhythmia  Will need f/u with neurovascular team in 6-8 weeks   10/17 patient with worsening lethargy, alert and oriented x 2.  STAT CT head unremarkable - see encephalopathy for further plan   PT/OT recommending level 1  Bipolar I disorder, severe, current or most recent episode depressed, with psychotic features (HCC)  History of Bipolar disorder, follow with psych  Continue Zyprexa, buproprion  GERD (gastroesophageal reflux disease)  Continue home dose PPI  Hyperlipidemia  Continue home statin therapy  Tardive dyskinesia  Continue valbenazine  Hypertensive emergency  Hypertensive emergency on admission in the setting of likely CVA  Evidenced by bp 197/104->231/105 POA  S/p Cardizem drip 10/15  Restarted patient's at home blood pressure medication Norvasc 10 mg and labetalol 100 mg twice daily. Blood pressure continues to be elevated   Appreciate nephro assistance in BP management   D/c'd amlodipine, add Nifedipine 60mg daily and torsemide  10mg daily   Type 2 diabetes mellitus with hyperglycemia, without long-term current use of insulin (HCC)  Lab Results   Component Value Date    HGBA1C 8.6 (H) 10/16/2024       Recent Labs     10/16/24  1122 10/16/24  1729 10/17/24  0745 10/17/24  1045   POCGLU 232* 118 141* 140       Blood Sugar Average: Last 72 hrs:  (P) 161.875    Currently maintained on semaglutide monotherapy - hold on admission   Continue sliding scale   Continue to monitor and adjust regimen as needed   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 (HCC)  Lab Results   Component Value Date    EGFR 18 10/17/2024    EGFR 19 10/16/2024    EGFR 21 10/15/2024    CREATININE 3.52 (H) 10/17/2024    CREATININE 3.27 (H) 10/16/2024    CREATININE 3.08 (H) 10/15/2024     Baseline Cr 2.9  Nephro following, appreciate recs   Encephalopathy  Noted to be more lethargic on exam today  He is alert and oriented x2 (person and place)  Arousable and answering questions appropriately however falling in and out of sleep   VBG without retention, ammonia WNL   Tmax 100.3 this am - will start infectious workup with blood cultures, procal, UA, lactic, and CXR   Continue to monitor for improvement   Tylenol PRN fevers  ROSINA (acute kidney injury) (HCC)  Evidenced by Cr 3.52 on am labs from 3.08 48 hours prior   Nephrology following, appreciate recs     VTE Pharmacologic Prophylaxis:   Moderate Risk (Score 3-4) - Pharmacological DVT Prophylaxis Ordered: heparin.    Mobility:   Basic Mobility Inpatient Raw Score: 6  JH-HLM Goal: 2: Bed activities/Dependent transfer  JH-HLM Achieved: 3: Sit at edge of bed  JH-HLM Goal achieved. Continue to encourage appropriate mobility.    Patient Centered Rounds: I performed bedside rounds with nursing staff today.   Discussions with Specialists or Other Care Team Provider: None    Education and Discussions with Family / Patient: Updated  (wife) via phone.    Current Length of Stay:  "2 day(s)  Current Patient Status: Inpatient   Certification Statement: The patient will continue to require additional inpatient hospital stay due to Continued medical management of acute CVA, encephalopathy  Discharge Plan: Anticipate discharge in 24-48 hrs to discharge location to be determined pending rehab evaluations.    Code Status: Level 1 - Full Code    Subjective   Seen and examined.  No acute events overnight.  Patient alert and oriented to person, and place.  Denies any chest pain, shortness of breath, nausea, vomiting, diarrhea, abdominal pain, cough. States he feels \"groggy\" today. Offers no additional complaints    Objective :  Temp:  [98 °F (36.7 °C)-100.3 °F (37.9 °C)] 99.3 °F (37.4 °C)  HR:  [] 105  BP: (142-218)/() 166/95  Resp:  [20-48] 22  SpO2:  [96 %-100 %] 97 %  O2 Device: None (Room air)    Body mass index is 40.21 kg/m².     Input and Output Summary (last 24 hours):     Intake/Output Summary (Last 24 hours) at 10/17/2024 1319  Last data filed at 10/17/2024 1129  Gross per 24 hour   Intake 260 ml   Output 600 ml   Net -340 ml       Physical Exam  Constitutional:       General: He is not in acute distress.     Appearance: He is ill-appearing.   HENT:      Mouth/Throat:      Mouth: Mucous membranes are moist.   Eyes:      Conjunctiva/sclera: Conjunctivae normal.   Cardiovascular:      Heart sounds: No murmur heard.  Abdominal:      Palpations: Abdomen is soft.      Tenderness: There is no abdominal tenderness.   Musculoskeletal:      Right lower leg: Edema present.      Left lower leg: Edema present.   Skin:     General: Skin is warm.   Neurological:      Motor: Weakness (R sided weakness) present.       Lines/Drains:    Telemetry:  Telemetry Orders (From admission, onward)               24 Hour Telemetry Monitoring  Continuous x 24 Hours (Telem)        Expiring   Question:  Reason for 24 Hour Telemetry  Answer:  TIA/Suspected CVA/ Confirmed CVA                     Telemetry " Reviewed: Sinus Tachycardia  Indication for Continued Telemetry Use: Acute CVA               Lab Results: I have reviewed the following results:   Results from last 7 days   Lab Units 10/17/24  0557 10/16/24  0504   WBC Thousand/uL 8.35 7.27   HEMOGLOBIN g/dL 10.9* 10.8*   HEMATOCRIT % 32.7* 32.8*   PLATELETS Thousands/uL 194 177   SEGS PCT %  --  63   LYMPHO PCT %  --  23   MONO PCT %  --  9   EOS PCT %  --  4     Results from last 7 days   Lab Units 10/17/24  0459   SODIUM mmol/L 138   POTASSIUM mmol/L 5.1   CHLORIDE mmol/L 112*   CO2 mmol/L 18*   BUN mg/dL 26*   CREATININE mg/dL 3.52*   ANION GAP mmol/L 8   CALCIUM mg/dL 8.7   ALBUMIN g/dL 3.0*   TOTAL BILIRUBIN mg/dL 0.43   ALK PHOS U/L 69   ALT U/L 9   AST U/L 21   GLUCOSE RANDOM mg/dL 152*     Results from last 7 days   Lab Units 10/17/24  0459   INR  1.13     Results from last 7 days   Lab Units 10/17/24  1045 10/17/24  0745 10/16/24  1729 10/16/24  1122 10/16/24  0027 10/15/24  1722 10/15/24  1217 10/14/24  2244 10/14/24  0753   POC GLUCOSE mg/dl 140 141* 118 232* 148* 106 250* 160* 162*     Results from last 7 days   Lab Units 10/16/24  0504 10/15/24  0553   HEMOGLOBIN A1C % 8.6* 8.1*           Recent Cultures (last 7 days):         Imaging Results Review: I reviewed radiology reports from this admission including: CT head and MRI brain.  Other Study Results Review: No additional pertinent studies reviewed.    Last 24 Hours Medication List:     Current Facility-Administered Medications:     acetaminophen (TYLENOL) tablet 650 mg, Q6H PRN    aspirin (ECOTRIN LOW STRENGTH) EC tablet 81 mg, Daily    atorvastatin (LIPITOR) tablet 40 mg, QPM    buPROPion (WELLBUTRIN) tablet 100 mg, BID    chlorhexidine (PERIDEX) 0.12 % oral rinse 15 mL, Q12H MIGEL    clopidogrel (PLAVIX) tablet 75 mg, Daily    heparin (porcine) subcutaneous injection 7,500 Units, Q8H MIGEL    hydrALAZINE (APRESOLINE) injection 10 mg, Q6H PRN    insulin lispro (HumALOG/ADMELOG) 100 units/mL  subcutaneous injection 2-12 Units, TID AC **AND** Fingerstick Glucose (POCT), TID AC    labetalol (NORMODYNE) tablet 100 mg, BID    [START ON 10/18/2024] NIFEdipine (PROCARDIA XL) 24 hr tablet 60 mg, Daily    OLANZapine (ZyPREXA) tablet 10 mg, HS    pantoprazole (PROTONIX) EC tablet 40 mg, Early Morning    polyethylene glycol (MIRALAX) packet 17 g, Daily PRN    torsemide (DEMADEX) tablet 10 mg, Daily    traZODone (DESYREL) tablet 50 mg, HS PRN    Valbenazine Tosylate CAPS 40 mg, HS    Administrative Statements   Today, Patient Was Seen By: Macey Almeida PA-C    **Please Note: This note may have been constructed using a voice recognition system.**

## 2024-10-17 NOTE — ASSESSMENT & PLAN NOTE
Lab Results   Component Value Date    HGBA1C 8.6 (H) 10/16/2024       Recent Labs     10/16/24  1122 10/16/24  1729 10/17/24  0745 10/17/24  1045   POCGLU 232* 118 141* 140       Blood Sugar Average: Last 72 hrs:  (P) 161.875

## 2024-10-17 NOTE — PHYSICAL THERAPY NOTE
PHYSICAL THERAPY NOTE          Patient Name: Tommie Taylor  Today's Date: 10/17/2024           10/17/24 0843   PT Last Visit   PT Visit Date 10/17/24   Note Type   Note Type Treatment   Pain Assessment   Pain Assessment Tool 0-10   Pain Score No Pain   Restrictions/Precautions   Weight Bearing Precautions Per Order No   Other Precautions Impulsive;Cognitive;Chair Alarm;Bed Alarm  (+R side hemiparesis)   General   Chart Reviewed Yes   Additional Pertinent History pt with BP of 152/108 while seated EOB, RN made aware   Response to Previous Treatment Patient with no complaints from previous session.   Family/Caregiver Present No   Cognition   Overall Cognitive Status Impaired   Arousal/Participation Cooperative;Lethargic   Attention Attends with cues to redirect   Orientation Level Oriented to person;Oriented to place;Disoriented to time;Disoriented to situation   Memory Decreased short term memory;Decreased recall of precautions;Decreased recall of recent events   Following Commands Follows one step commands with increased time or repetition   Comments pt was motivated to participate in PT intervention but was frustrated throughout PT intervention due to current situation   Subjective   Subjective pt stated no pain   Bed Mobility   Additional Comments Upon arrival to pt room pt seated EOB w/ OT Iram and post tx session pt was supine in the bed with all needs met   Transfers   Sit to Stand 2  Maximal assistance   Additional items Assist x 2;Increased time required;Verbal cues   Stand to Sit 2  Maximal assistance   Additional items Assist x 2;Increased time required;Verbal cues   Additional Comments pt required max Ax2 for STS from EOB with bilateral HHA. pt had difficulty clearing buttocks from EOB and was unable to obtain upright posture   Ambulation/Elevation   Gait pattern (S)  Not tested;Not appropriate  (with w/ high BP  152/108. Spoke to RN who stated to return pt to supine position)   Balance   Static Sitting Fair -   Dynamic Sitting Poor   Static Standing Poor   Dynamic Standing Poor -   Ambulatory Zero   Endurance Deficit   Endurance Deficit Yes   Endurance Deficit Description limited bed mobility, sitting balance, static standing   Activity Tolerance   Activity Tolerance Other (Comment);Patient limited by fatigue;Treatment limited secondary to medical complications (Comment)  (+R sided hemiparesis)   Nurse Made Aware Spoke to RN   Exercises   Hip Flexion Supine;15 reps;PROM;Right   Hip Abduction Supine;20 reps;PROM;Right   Hip Adduction Supine;20 reps;PROM;Right   Knee AROM Short Arc Quad Supine;20 reps;PROM;Right   Knee PROM Flexion Supine;10 reps;PROM;Right   Knee PROM Extension Supine;10 reps;PROM;Right   Ankle Pumps Supine;20 reps;AROM;Bilateral   Assessment   Prognosis Good   Problem List Decreased strength;Decreased range of motion;Decreased endurance;Impaired balance;Decreased mobility;Decreased coordination;Obesity   Assessment pt began tx session seated EOB w/ FREDI Walden as pt was agreeable to participate in PT intervention. Care coordination with FREDI Walden due to pt anticipated level of assistance required for functional transfers and bed mobility. While seated EOB pt required between min Ax1 to /s as pt had slight LOB anteriorly that required assistance. pt continues to require max ax2 for all STS in todays tx session as pt was unable to obtain upright posture as pt required max ax2 for clearing buttocks off EOB. pt was also max Ax2 for lateral scooting towards HOB. While in supine position pt required PROM for all TE of RLE/RUE except for bilateral ankle pumps.pt was also max ax2 for repositioning towards HOB. Post tx session pt in bed with call bell on L side and all pt needs met. pt continues to be functioning below his base line level and aould benefit from continued skilled PT intervention. Continue to  recommend DC w/ level 1 maximal  rehab resource intensity when medically cleared   Goals   Patient Goals none stated   STG Expiration Date 10/29/24   PT Treatment Day 2   Plan   Treatment/Interventions Functional transfer training;LE strengthening/ROM;Elevations;Therapeutic exercise;Endurance training;Patient/family training;Equipment eval/education;Bed mobility;Gait training;Spoke to nursing;Compensatory technique education   Progress No functional improvements   PT Frequency 4-6x/wk   Discharge Recommendation   Rehab Resource Intensity Level, PT I (Maximum Resource Intensity)   AM-PAC Basic Mobility Inpatient   Turning in Flat Bed Without Bedrails 1   Lying on Back to Sitting on Edge of Flat Bed Without Bedrails 1   Moving Bed to Chair 1   Standing Up From Chair Using Arms 1   Walk in Room 1   Climb 3-5 Stairs With Railing 1   Basic Mobility Inpatient Raw Score 6   [facility] Highest Level Of Mobility   -HL Goal 2: Bed activities/Dependent transfer   -HLM Achieved 3: Sit at edge of bed   Education   Education Provided Other  (TE activities, bed mobily, functional transfers)   Patient Reinforcement needed   End of Consult   Patient Position at End of Consult Supine;Bed/Chair alarm activated;All needs within reach   The patient's AM-PAC Basic Mobility Inpatient Short Form Raw Score is 6. A Raw score of less than or equal to 16 suggests the patient may benefit from discharge to post-acute rehabilitation services. Please also refer to the recommendation of the Physical Therapist for safe discharge planning.    Lisandro Bell      Spoke to JASPER in reference to CATALINO cuevas

## 2024-10-17 NOTE — ASSESSMENT & PLAN NOTE
POA CR: 3.24(Trend: 3.24>3.08)  CR on consultation: 3.52  Current antihypertensive regimen: Amlodipine 10 mg daily and labetalol 100 mg twice daily  Etiology: Likely secondary to hypertensive emergency/possibly contrast associated nephropathy/could be a component of hypoperfusion from dropping blood pressure since presentation    Plan:  Tight blood pressure control, currently patient is out of the window of permissive hypertension, continue current regimen for now, will change to nifedipine 60 mg daily tomorrow, can increase labetalol since we have some room with heart rate  Consider chest x-ray to look for signs of volume overload  Keep MAP greater than 65, avoid hypois awaiting for transfer perfusion  Avoid nephrotoxic agents not limited to NSAIDs, avoid contrast if possible  Monitor BMP daily

## 2024-10-17 NOTE — ASSESSMENT & PLAN NOTE
Lab Results   Component Value Date    EGFR 18 10/17/2024    EGFR 19 10/16/2024    EGFR 21 10/15/2024    CREATININE 3.52 (H) 10/17/2024    CREATININE 3.27 (H) 10/16/2024    CREATININE 3.08 (H) 10/15/2024     Baseline Cr 2.9  Nephro following, lynne childers

## 2024-10-17 NOTE — PLAN OF CARE
Problem: PHYSICAL THERAPY ADULT  Goal: Performs mobility at highest level of function for planned discharge setting.  See evaluation for individualized goals.  Description: Treatment/Interventions: Functional transfer training, LE strengthening/ROM, Elevations, Therapeutic exercise, Endurance training, Patient/family training, Equipment eval/education, Bed mobility, Gait training, Compensatory technique education, Spoke to nursing, Spoke to case management    Equipment Recommended: Other (Comment) (TBD pending progress)     See flowsheet documentation for full assessment, interventions and recommendations.  Outcome: Not Progressing  Note: Prognosis: Good  Problem List: Decreased strength, Decreased range of motion, Decreased endurance, Impaired balance, Decreased mobility, Decreased coordination, Obesity  Assessment: pt began tx session seated EOB w/ OT Iram as pt was agreeable to participate in PT intervention. Care coordination with FREDI Walden due to pt anticipated level of assistance required for functional transfers and bed mobility. While seated EOB pt required between min Ax1 to /s as pt had slight LOB anteriorly that required assistance. pt continues to require max ax2 for all STS in todays tx session as pt was unable to obtain upright posture as pt required max ax2 for clearing buttocks off EOB. pt was also max Ax2 for lateral scooting towards HOB. While in supine position pt required PROM for all TE of RLE/RUE except for bilateral ankle pumps.pt was also max ax2 for repositioning towards HOB. Post tx session pt in bed with call bell on L side and all pt needs met. pt continues to be functioning below his base line level and aould benefit from continued skilled PT intervention. Continue to recommend DC w/ level 1 maximal  rehab resource intensity when medically cleared  Barriers to Discharge: Inaccessible home environment     Rehab Resource Intensity Level, PT: I (Maximum Resource Intensity)    See  flowsheet documentation for full assessment.

## 2024-10-17 NOTE — ASSESSMENT & PLAN NOTE
Hypertensive emergency on admission in the setting of likely CVA  Evidenced by bp 197/104->231/105 POA  S/p Cardizem drip 10/15  Restarted patient's at home blood pressure medication Norvasc 10 mg and labetalol 100 mg twice daily. Blood pressure continues to be elevated   Appreciate nephro assistance in BP management   D/c'd amlodipine, add Nifedipine 60mg daily and torsemide 10mg daily

## 2024-10-17 NOTE — ASSESSMENT & PLAN NOTE
Noted to be more lethargic on exam today  He is alert and oriented x2 (person and place)  Arousable and answering questions appropriately however falling in and out of sleep   VBG without retention, ammonia WNL   Tmax 100.3 this am - will start infectious workup with blood cultures, procal, UA, lactic, and CXR   Continue to monitor for improvement   Tylenol PRN fevers

## 2024-10-18 ENCOUNTER — TELEPHONE (OUTPATIENT)
Age: 59
End: 2024-10-18

## 2024-10-18 ENCOUNTER — APPOINTMENT (INPATIENT)
Dept: ULTRASOUND IMAGING | Facility: HOSPITAL | Age: 59
DRG: 061 | End: 2024-10-18
Payer: MEDICARE

## 2024-10-18 DIAGNOSIS — K29.70 GASTRITIS WITHOUT BLEEDING, UNSPECIFIED CHRONICITY, UNSPECIFIED GASTRITIS TYPE: Primary | ICD-10-CM

## 2024-10-18 PROBLEM — R65.10 SIRS (SYSTEMIC INFLAMMATORY RESPONSE SYNDROME) (HCC): Status: ACTIVE | Noted: 2024-10-18

## 2024-10-18 LAB
ANION GAP SERPL CALCULATED.3IONS-SCNC: 8 MMOL/L (ref 4–13)
BUN SERPL-MCNC: 33 MG/DL (ref 5–25)
C3 SERPL-MCNC: 144 MG/DL (ref 87–200)
C4 SERPL-MCNC: 48 MG/DL (ref 19–52)
CALCIUM SERPL-MCNC: 8.8 MG/DL (ref 8.4–10.2)
CHLORIDE SERPL-SCNC: 112 MMOL/L (ref 96–108)
CO2 SERPL-SCNC: 22 MMOL/L (ref 21–32)
CREAT SERPL-MCNC: 4.19 MG/DL (ref 0.6–1.3)
ERYTHROCYTE [DISTWIDTH] IN BLOOD BY AUTOMATED COUNT: 14.8 % (ref 11.6–15.1)
GFR SERPL CREATININE-BSD FRML MDRD: 14 ML/MIN/1.73SQ M
GLUCOSE SERPL-MCNC: 137 MG/DL (ref 65–140)
GLUCOSE SERPL-MCNC: 147 MG/DL (ref 65–140)
GLUCOSE SERPL-MCNC: 166 MG/DL (ref 65–140)
GLUCOSE SERPL-MCNC: 176 MG/DL (ref 65–140)
HCT VFR BLD AUTO: 31.5 % (ref 36.5–49.3)
HGB BLD-MCNC: 10.3 G/DL (ref 12–17)
MCH RBC QN AUTO: 26 PG (ref 26.8–34.3)
MCHC RBC AUTO-ENTMCNC: 32.7 G/DL (ref 31.4–37.4)
MCV RBC AUTO: 80 FL (ref 82–98)
PLATELET # BLD AUTO: 212 THOUSANDS/UL (ref 149–390)
PMV BLD AUTO: 9.3 FL (ref 8.9–12.7)
POTASSIUM SERPL-SCNC: 4.6 MMOL/L (ref 3.5–5.3)
PROCALCITONIN SERPL-MCNC: 0.29 NG/ML
RBC # BLD AUTO: 3.96 MILLION/UL (ref 3.88–5.62)
SODIUM SERPL-SCNC: 142 MMOL/L (ref 135–147)
WBC # BLD AUTO: 8.28 THOUSAND/UL (ref 4.31–10.16)

## 2024-10-18 PROCEDURE — 99232 SBSQ HOSP IP/OBS MODERATE 35: CPT | Performed by: INTERNAL MEDICINE

## 2024-10-18 PROCEDURE — 92526 ORAL FUNCTION THERAPY: CPT

## 2024-10-18 PROCEDURE — 99232 SBSQ HOSP IP/OBS MODERATE 35: CPT | Performed by: STUDENT IN AN ORGANIZED HEALTH CARE EDUCATION/TRAINING PROGRAM

## 2024-10-18 PROCEDURE — 85027 COMPLETE CBC AUTOMATED: CPT

## 2024-10-18 PROCEDURE — 80048 BASIC METABOLIC PNL TOTAL CA: CPT

## 2024-10-18 PROCEDURE — 82948 REAGENT STRIP/BLOOD GLUCOSE: CPT

## 2024-10-18 PROCEDURE — 84145 PROCALCITONIN (PCT): CPT

## 2024-10-18 PROCEDURE — 84165 PROTEIN E-PHORESIS SERUM: CPT | Performed by: STUDENT IN AN ORGANIZED HEALTH CARE EDUCATION/TRAINING PROGRAM

## 2024-10-18 PROCEDURE — 86160 COMPLEMENT ANTIGEN: CPT | Performed by: STUDENT IN AN ORGANIZED HEALTH CARE EDUCATION/TRAINING PROGRAM

## 2024-10-18 PROCEDURE — 76775 US EXAM ABDO BACK WALL LIM: CPT

## 2024-10-18 PROCEDURE — 83516 IMMUNOASSAY NONANTIBODY: CPT | Performed by: STUDENT IN AN ORGANIZED HEALTH CARE EDUCATION/TRAINING PROGRAM

## 2024-10-18 RX ORDER — LABETALOL 100 MG/1
200 TABLET, FILM COATED ORAL 2 TIMES DAILY
Status: DISCONTINUED | OUTPATIENT
Start: 2024-10-18 | End: 2024-10-22

## 2024-10-18 RX ORDER — NIFEDIPINE 30 MG/1
90 TABLET, EXTENDED RELEASE ORAL DAILY
Status: DISCONTINUED | OUTPATIENT
Start: 2024-10-18 | End: 2024-10-26 | Stop reason: HOSPADM

## 2024-10-18 RX ORDER — TORSEMIDE 20 MG/1
20 TABLET ORAL DAILY
Status: DISCONTINUED | OUTPATIENT
Start: 2024-10-19 | End: 2024-10-25

## 2024-10-18 RX ADMIN — CHLORHEXIDINE GLUCONATE 15 ML: 1.2 RINSE ORAL at 21:31

## 2024-10-18 RX ADMIN — LABETALOL HYDROCHLORIDE 200 MG: 100 TABLET, FILM COATED ORAL at 17:19

## 2024-10-18 RX ADMIN — CLOPIDOGREL BISULFATE 75 MG: 75 TABLET ORAL at 09:29

## 2024-10-18 RX ADMIN — BUPROPION HYDROCHLORIDE TABLETS 100 MG: 100 TABLET, FILM COATED ORAL at 17:19

## 2024-10-18 RX ADMIN — HEPARIN SODIUM 7500 UNITS: 5000 INJECTION INTRAVENOUS; SUBCUTANEOUS at 13:29

## 2024-10-18 RX ADMIN — TORSEMIDE 10 MG: 10 TABLET ORAL at 09:29

## 2024-10-18 RX ADMIN — ATORVASTATIN CALCIUM 40 MG: 40 TABLET, FILM COATED ORAL at 17:19

## 2024-10-18 RX ADMIN — HEPARIN SODIUM 7500 UNITS: 5000 INJECTION INTRAVENOUS; SUBCUTANEOUS at 21:31

## 2024-10-18 RX ADMIN — PANTOPRAZOLE SODIUM 40 MG: 40 TABLET, DELAYED RELEASE ORAL at 06:09

## 2024-10-18 RX ADMIN — BUPROPION HYDROCHLORIDE TABLETS 100 MG: 100 TABLET, FILM COATED ORAL at 09:29

## 2024-10-18 RX ADMIN — CEFEPIME 2000 MG: 2 INJECTION, POWDER, FOR SOLUTION INTRAVENOUS at 09:34

## 2024-10-18 RX ADMIN — VALBENAZINE 40 MG: 40 CAPSULE ORAL at 21:52

## 2024-10-18 RX ADMIN — HEPARIN SODIUM 7500 UNITS: 5000 INJECTION INTRAVENOUS; SUBCUTANEOUS at 06:09

## 2024-10-18 RX ADMIN — LABETALOL HYDROCHLORIDE 200 MG: 100 TABLET, FILM COATED ORAL at 09:29

## 2024-10-18 RX ADMIN — ASPIRIN 81 MG: 81 TABLET, COATED ORAL at 09:29

## 2024-10-18 RX ADMIN — NIFEDIPINE 90 MG: 30 TABLET, FILM COATED, EXTENDED RELEASE ORAL at 09:29

## 2024-10-18 RX ADMIN — VANCOMYCIN HYDROCHLORIDE 2000 MG: 10 INJECTION, POWDER, LYOPHILIZED, FOR SOLUTION INTRAVENOUS at 09:46

## 2024-10-18 RX ADMIN — OLANZAPINE 10 MG: 10 TABLET, FILM COATED ORAL at 21:31

## 2024-10-18 NOTE — ASSESSMENT & PLAN NOTE
Etiology: likely secondary to ATN vs TMA in the settings of uncontrolled  complicated with contrast associated nephropathy  Baseline creatinine: Around 2.9  Current creatinine: 4.19mg/dL trending up  Peak creatinine: Trending  UA: Microhematuria, proteinuria  Renal imaging: Ultrasound ordered   Treatment:  No indication of dialysis today.  I anticipate the patient will require dialysis in the next 48 to 72 hours if no improvement of kidney function  Maintain MAP:  Over 65 mmHg if possible/avoid hypoperfusion:  Hold parameters on blood pressure medications  Avoid nephrotoxic agents such as NSAIDs, and IV contrast if possible. Avoid opioids   Adjust medications to GFR

## 2024-10-18 NOTE — ASSESSMENT & PLAN NOTE
Lab Results   Component Value Date    HGBA1C 8.6 (H) 10/16/2024       Recent Labs     10/17/24  1552 10/17/24  2048 10/18/24  0753 10/18/24  1132   POCGLU 149* 174* 137 166*       Blood Sugar Average: Last 72 hrs:  (P) [id number]    Currently maintained on semaglutide monotherapy - hold on admission   SSI with accu checks  Hypoglycemia protocol  Diabetic diet

## 2024-10-18 NOTE — ASSESSMENT & PLAN NOTE
Lab Results   Component Value Date    EGFR 14 10/18/2024    EGFR 18 10/17/2024    EGFR 19 10/16/2024    CREATININE 4.19 (H) 10/18/2024    CREATININE 3.52 (H) 10/17/2024    CREATININE 3.27 (H) 10/16/2024     Baseline creatinine around 2.9  See plan under ROSINA

## 2024-10-18 NOTE — PROGRESS NOTES
Tommei Taylor is a 59 y.o. male who is currently ordered Vancomycin IV with management by the Pharmacy Consult service.  Relevant clinical data and objective / subjective history reviewed.  Vancomycin Assessment:  Indication and Goal AUC/Trough: Other, FUO, -600, trough >10  Clinical Status:  new  Micro:   2/2 blood cultures in progress  Renal Function:  SCr: 4.19 mg/dL  CrCl: 22 mL/min  Renal replacement: Not on dialysis  Days of Therapy: 1  Current Dose: 2000 mg IV loading dose given  Vancomycin Plan:  New Dosing: pulse dosing 15 mg/kg (1250 mg) IV as needed for random level < or = 15  Next Level: 10/19 @ 0600  Renal Function Monitoring: Daily BMP and UOP  Pharmacy will continue to follow closely for s/sx of nephrotoxicity, infusion reactions and appropriateness of therapy.  BMP and CBC will be ordered per protocol. We will continue to follow the patient’s culture results and clinical progress daily.    Maura Patterson, Pharmacist

## 2024-10-18 NOTE — ASSESSMENT & PLAN NOTE
Etiology suggested to be secondary to ATN versus TMA in the setting of uncontrolled blood pressure and complicated by contrast associated nephropathy   Baseline creatinine around 2.9  Current creatinine 4.19, trending up  UA with microhematuria, proteinuria  No indication of dialysis at this time per nephrology.  However, if renal function does not start to improve in the next 48 to 72 hours may consider starting on dialysis.  Ultrasound of kidney and bladder ordered  Avoid nephrotoxic agents  Monitor I&Os  Daily BMP  Nephrology following, input is appreciated

## 2024-10-18 NOTE — ASSESSMENT & PLAN NOTE
Noted to be more lethargic on exam 10/17   VBG without retention, ammonia WNL   New onset fevers Tmax 100.3. Infectious work up ordered. Temperatures improving today 99.1F  Patient appears more awake today. AAO x3 on exam  Possibly in the setting of underlying infection, continue antibiotics as below  Neuro checks

## 2024-10-18 NOTE — ASSESSMENT & PLAN NOTE
Lab Results   Component Value Date    EGFR 14 10/18/2024    EGFR 18 10/17/2024    EGFR 19 10/16/2024    CREATININE 4.19 (H) 10/18/2024    CREATININE 3.52 (H) 10/17/2024    CREATININE 3.27 (H) 10/16/2024   Calcium 9.5 mg/dL  Phosphorus 3.8mg/dL, goal Less 5.5  No indication of binders

## 2024-10-18 NOTE — ASSESSMENT & PLAN NOTE
Volume: Fluid overload  Blood pressure: Hypertensive, /95mmhg   Recommend:  Low-sodium diet  Increase torsemide to 20 mg   Increase nifedipine to 90 mg   increase labetalol to 200 twice daily   Diuretics 10mg

## 2024-10-18 NOTE — TELEPHONE ENCOUNTER
STILL ADMITTED:10/14/2024 - present (4 days)  [facility]    1ST ATTEMPT,     VIA [employer]     Thank you,     Brooklyn MACKEY/ SUNIL TRNET/ SETH    DC-     ----- Message from Kaykay Ch DO sent at 10/16/2024 10:57 AM EDT -----  Will need follow up with neurovascular attending/AP/resident in 6-8 weeks (60 min visit) for acute lacunar infarct. No additional neurodiagnostics outpatient indicated at this time.

## 2024-10-18 NOTE — ASSESSMENT & PLAN NOTE
Hypertensive emergency on admission in the setting of likely CVA  Evidenced by bp 197/104->231/105 POA  S/p Cardizem drip 10/15  PTA medications: Norvasc 10 mg and labetalol 100 mg twice daily.   Blood pressure continues to be elevated   Appreciate nephro assistance in BP management   Discontinue Norvasc  Increased nifedipine 90mg daily  Increased labetolol 200mg daily

## 2024-10-18 NOTE — ASSESSMENT & PLAN NOTE
Noted on 10/17/24 for tachycardia and fever  UA negative for infection  CXR (wet read) without acute infectious process  Lactic acid WNL  Procalcitonin 0.29, continue to trend  Started on IV cefepime and vancomycin for now  Follow up BC x2  Trend WBC and fever curve

## 2024-10-18 NOTE — PROGRESS NOTES
Progress Note - Hospitalist   Name: Tommie Taylor 59 y.o. male I MRN: [id number]  Unit/Bed#: S -01 I Date of Admission: 10/14/2024   Date of Service: 10/18/2024 I Hospital Day: 3    Assessment & Plan  CVA (cerebral vascular accident) (HCC)  Patient presented with right upper and right lower extremity weakness with associated right facial droop, highly suggestive of CVA  CT/CTA head unremarkable for acute process  TnK administered following correction of hypertension with nicardipine drip in the ICU  Following thrombolytic administration patient developed worsening slurred speech and headache, had repeat CT head which was unremarkable  Repeat head CT at 24 hours post TNK negative for signs of bleed  Neurology consult  MRI significant for left corona radiata stroke.  Placed on DAPT therapy for 21 days and then continue aspirin monotherapy indefinitely per neurology.  Started on Lipitor 40 mg per stroke protocol  Recommend Zio patch outpatient to evaluate for any possible arrhythmia  Will need f/u with neurovascular team in 6-8 weeks   10/17 patient with worsening lethargy, alert and oriented x 2.  STAT CT head unremarkable - see encephalopathy for further plan   PT/OT recommending level 1  Bipolar I disorder, severe, current or most recent episode depressed, with psychotic features (HCC)  History of Bipolar disorder, follow with psych  Continue Zyprexa, buproprion  GERD (gastroesophageal reflux disease)  Continue home dose PPI  Hyperlipidemia  Continue home statin therapy  Tardive dyskinesia  Continue valbenazine  Hypertensive emergency  Hypertensive emergency on admission in the setting of likely CVA  Evidenced by bp 197/104->231/105 POA  S/p Cardizem drip 10/15  PTA medications: Norvasc 10 mg and labetalol 100 mg twice daily.   Blood pressure continues to be elevated   Appreciate nephro assistance in BP management   Discontinue Norvasc  Increased nifedipine 90mg daily  Increased labetolol 200mg daily  Type  2 diabetes mellitus with hyperglycemia, without long-term current use of insulin ([facility])  Lab Results   Component Value Date    HGBA1C 8.6 (H) 10/16/2024       Recent Labs     10/17/24  1552 10/17/24  2048 10/18/24  0753 10/18/24  1132   POCGLU 149* 174* 137 166*       Blood Sugar Average: Last 72 hrs:  (P) 160.2280411746873488    Currently maintained on semaglutide monotherapy - hold on admission   SSI with accu checks  Hypoglycemia protocol  Diabetic diet  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 ([facility])  Lab Results   Component Value Date    EGFR 14 10/18/2024    EGFR 18 10/17/2024    EGFR 19 10/16/2024    CREATININE 4.19 (H) 10/18/2024    CREATININE 3.52 (H) 10/17/2024    CREATININE 3.27 (H) 10/16/2024     Baseline creatinine around 2.9  See plan under ROSINA  Encephalopathy  Noted to be more lethargic on exam 10/17   VBG without retention, ammonia WNL   New onset fevers Tmax 100.3. Infectious work up ordered. Temperatures improving today 99.1F  Patient appears more awake today. AAO x3 on exam  Possibly in the setting of underlying infection, continue antibiotics as below  Neuro checks  ROSINA (acute kidney injury) ([facility])  Etiology suggested to be secondary to ATN versus TMA in the setting of uncontrolled blood pressure and complicated by contrast associated nephropathy   Baseline creatinine around 2.9  Current creatinine 4.19, trending up  UA with microhematuria, proteinuria  No indication of dialysis at this time per nephrology.  However, if renal function does not start to improve in the next 48 to 72 hours may consider starting on dialysis.  Ultrasound of kidney and bladder ordered  Avoid nephrotoxic agents  Monitor I&Os  Daily BMP  Nephrology following, input is appreciated  SIRS (systemic inflammatory response syndrome) (HCC)  Noted on 10/17/24 for tachycardia and fever  UA negative for infection  CXR (wet read) without acute infectious process  Lactic acid  WNL  Procalcitonin 0.29, continue to trend  Started on IV cefepime and vancomycin for now  Follow up BC x2  Trend WBC and fever curve    VTE Pharmacologic Prophylaxis:   High Risk (Score >/= 5) - Pharmacological DVT Prophylaxis Ordered: heparin. Sequential Compression Devices Ordered.    Mobility:   Basic Mobility Inpatient Raw Score: 7  JH-HLM Goal: 2: Bed activities/Dependent transfer  JH-HLM Achieved: 2: Bed activities/Dependent transfer  JH-HLM Goal NOT achieved. Continue with multidisciplinary rounding and encourage appropriate mobility to improve upon JH-HLM goals.    Patient Centered Rounds: I performed bedside rounds with nursing staff today.   Discussions with Specialists or Other Care Team Provider: Nursing, Nephrology    Education and Discussions with Family / Patient: Updated  (wife) via phone.    Current Length of Stay: 3 day(s)  Current Patient Status: Inpatient   Certification Statement: The patient will continue to require additional inpatient hospital stay due to ROSINA, HTN, SIRS  Discharge Plan: Anticipate discharge in >72 hrs to rehab facility.    Code Status: Level 1 - Full Code    Subjective   Patient lying in bed under no acute distress. He is awake and able to answer all orientation questions. Denies feeling feverish, chest pain, SOB, abdominal pain at this time. Ate most of his breakfast.     Objective :  Temp:  [99.1 °F (37.3 °C)-101.3 °F (38.5 °C)] 99.2 °F (37.3 °C)  HR:  [] 89  BP: (151-200)/() 191/95  Resp:  [20] 20  SpO2:  [97 %-99 %] 97 %  O2 Device: None (Room air)    Body mass index is 38.96 kg/m².     Input and Output Summary (last 24 hours):     Intake/Output Summary (Last 24 hours) at 10/18/2024 1359  Last data filed at 10/18/2024 0900  Gross per 24 hour   Intake 260 ml   Output 525 ml   Net -265 ml       Physical Exam  Vitals and nursing note reviewed.   Constitutional:       General: He is not in acute distress.     Appearance: He is well-developed. He is  obese. He is ill-appearing.   HENT:      Head: Normocephalic and atraumatic.   Eyes:      Conjunctiva/sclera: Conjunctivae normal.   Cardiovascular:      Rate and Rhythm: Normal rate and regular rhythm.      Heart sounds: No murmur heard.  Pulmonary:      Effort: Pulmonary effort is normal. No respiratory distress.      Breath sounds: Normal breath sounds.   Abdominal:      Palpations: Abdomen is soft.      Tenderness: There is no abdominal tenderness.   Musculoskeletal:         General: No swelling.      Cervical back: Neck supple.   Skin:     General: Skin is warm and dry.      Capillary Refill: Capillary refill takes less than 2 seconds.   Neurological:      Mental Status: He is alert and oriented to person, place, and time.      Comments: RUE and RLE weakness    Psychiatric:         Mood and Affect: Mood normal.           Lines/Drains:        Telemetry:  Telemetry Orders (From admission, onward)               24 Hour Telemetry Monitoring  Continuous x 24 Hours (Telem)           Question:  Reason for 24 Hour Telemetry  Answer:  TIA/Suspected CVA/ Confirmed CVA                     Telemetry Reviewed: Sinus Tachycardia  Indication for Continued Telemetry Use: No indication for continued use. Will discontinue.                Lab Results: I have reviewed the following results:   Results from last 7 days   Lab Units 10/18/24  0644 10/17/24  0557 10/16/24  0504   WBC Thousand/uL 8.28   < > 7.27   HEMOGLOBIN g/dL 10.3*   < > 10.8*   HEMATOCRIT % 31.5*   < > 32.8*   PLATELETS Thousands/uL 212   < > 177   SEGS PCT %  --   --  63   LYMPHO PCT %  --   --  23   MONO PCT %  --   --  9   EOS PCT %  --   --  4    < > = values in this interval not displayed.     Results from last 7 days   Lab Units 10/18/24  0644 10/17/24  0459   SODIUM mmol/L 142 138   POTASSIUM mmol/L 4.6 5.1   CHLORIDE mmol/L 112* 112*   CO2 mmol/L 22 18*   BUN mg/dL 33* 26*   CREATININE mg/dL 4.19* 3.52*   ANION GAP mmol/L 8 8   CALCIUM mg/dL 8.8 8.7    ALBUMIN g/dL  --  3.0*   TOTAL BILIRUBIN mg/dL  --  0.43   ALK PHOS U/L  --  69   ALT U/L  --  9   AST U/L  --  21   GLUCOSE RANDOM mg/dL 147* 152*     Results from last 7 days   Lab Units 10/17/24  0459   INR  1.13     Results from last 7 days   Lab Units 10/18/24  1132 10/18/24  0753 10/17/24  2048 10/17/24  1552 10/17/24  1045 10/17/24  0745 10/16/24  1729 10/16/24  1122 10/16/24  0027 10/15/24  1722 10/15/24  1217 10/14/24  2244   POC GLUCOSE mg/dl 166* 137 174* 149* 140 141* 118 232* 148* 106 250* 160*     Results from last 7 days   Lab Units 10/16/24  0504 10/15/24  0553   HEMOGLOBIN A1C % 8.6* 8.1*     Results from last 7 days   Lab Units 10/18/24  0644 10/17/24  1522 10/17/24  0459   LACTIC ACID mmol/L  --  0.8  --    PROCALCITONIN ng/ml 0.29*  --  0.20       Recent Cultures (last 7 days):   Results from last 7 days   Lab Units 10/17/24  1522 10/17/24  1027   BLOOD CULTURE  Received in Microbiology Lab. Culture in Progress. Received in Microbiology Lab. Culture in Progress.       Imaging Results Review: No pertinent imaging studies reviewed.  Other Study Results Review: No additional pertinent studies reviewed.    Last 24 Hours Medication List:     Current Facility-Administered Medications:     acetaminophen (TYLENOL) tablet 650 mg, Q6H PRN    aspirin (ECOTRIN LOW STRENGTH) EC tablet 81 mg, Daily    atorvastatin (LIPITOR) tablet 40 mg, QPM    buPROPion (WELLBUTRIN) tablet 100 mg, BID    ceFEPime (MAXIPIME) 2,000 mg in dextrose 5 % 50 mL IVPB, Q24H, Last Rate: 2,000 mg (10/18/24 0934)    chlorhexidine (PERIDEX) 0.12 % oral rinse 15 mL, Q12H MIGEL    clopidogrel (PLAVIX) tablet 75 mg, Daily    heparin (porcine) subcutaneous injection 7,500 Units, Q8H MIGEL    hydrALAZINE (APRESOLINE) injection 10 mg, Q6H PRN    insulin lispro (HumALOG/ADMELOG) 100 units/mL subcutaneous injection 2-12 Units, TID AC **AND** Fingerstick Glucose (POCT), TID AC    labetalol (NORMODYNE) tablet 200 mg, BID    NIFEdipine (PROCARDIA XL)  24 hr tablet 90 mg, Daily    OLANZapine (ZyPREXA) tablet 10 mg, HS    pantoprazole (PROTONIX) EC tablet 40 mg, Early Morning    polyethylene glycol (MIRALAX) packet 17 g, Daily PRN    [START ON 10/19/2024] torsemide (DEMADEX) tablet 20 mg, Daily    traZODone (DESYREL) tablet 50 mg, HS PRN    Valbenazine Tosylate CAPS 40 mg, HS    vancomycin (VANCOCIN) 1,250 mg in sodium chloride 0.9 % 250 mL IVPB, Once PRN    Administrative Statements   Today, Patient Was Seen By: Peyton Saunders PA-C  I have spent a total time of 30 minutes in caring for this patient on the day of the visit/encounter including Diagnostic results, Risks and benefits of tx options, Instructions for management, Patient and family education, Importance of tx compliance, Counseling / Coordination of care, Documenting in the medical record, Reviewing / ordering tests, medicine, procedures  , Obtaining or reviewing history  , and Communicating with other healthcare professionals .    **Please Note: This note may have been constructed using a voice recognition system.**

## 2024-10-18 NOTE — PROGRESS NOTES
Progress Note - Nephrology   Name: Tommie Taylor 59 y.o. male I MRN: [id number]  Unit/Bed#: S -01 I Date of Admission: 10/14/2024   Date of Service: 10/18/2024 I Hospital Day: 3     Assessment & Plan  RSOINA (acute kidney injury) (HCC)  Etiology: likely secondary to ATN vs TMA in the settings of uncontrolled  complicated with contrast associated nephropathy  Baseline creatinine: Around 2.9  Current creatinine: 4.19mg/dL trending up  Peak creatinine: Trending  UA: Microhematuria, proteinuria  Renal imaging: Ultrasound ordered   Treatment:  No indication of dialysis today.  I anticipate the patient will require dialysis in the next 48 to 72 hours if no improvement of kidney function  Maintain MAP:  Over 65 mmHg if possible/avoid hypoperfusion:  Hold parameters on blood pressure medications  Avoid nephrotoxic agents such as NSAIDs, and IV contrast if possible. Avoid opioids   Adjust medications to GFR  Acid-base disorder, mixed  serum HCO3 22  Metabolic acidosis improving  Hypertensive emergency  Volume: Fluid overload  Blood pressure: Hypertensive, /95mmhg   Recommend:  Low-sodium diet  Increase torsemide to 20 mg   Increase nifedipine to 90 mg   increase labetalol to 200 twice daily   Diuretics 10mg   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 (HCC)  Lab Results   Component Value Date    EGFR 14 10/18/2024    EGFR 18 10/17/2024    EGFR 19 10/16/2024    CREATININE 4.19 (H) 10/18/2024    CREATININE 3.52 (H) 10/17/2024    CREATININE 3.27 (H) 10/16/2024   Calcium 9.5 mg/dL  Phosphorus 3.8mg/dL, goal Less 5.5  No indication of binders   CVA (cerebral vascular accident) (HCC)  In the settings of uncontrolled hypertension  Neurology following    I have reviewed the nephrology recommendations including increasing labetalol and nifedipine , with primary team, and we are in agreement with renal plan including the information outlined above. I have discussed the above  management plan in detail with the primary service.     Subjective   Brief History of Admission - 60 yo man with PMH of obesity, CKD G3 B, hypertension p/w right-sided weakness.  Nephrology is consulted for management of ORSINA     Patient feels slightly better, no shortness of breath, no chest pain.  Remains hypertensive    Objective :  Temp:  [99.1 °F (37.3 °C)-101.3 °F (38.5 °C)] 99.2 °F (37.3 °C)  HR:  [] 89  BP: (151-200)/() 191/95  Resp:  [20] 20  SpO2:  [97 %-99 %] 97 %  O2 Device: None (Room air)    Current Weight: Weight - Scale: 110 kg (241 lb 6.5 oz)  First Weight: Weight - Scale: 133 kg (292 lb 5.3 oz)  I/O         10/16 0701  10/17 0700 10/17 0701  10/18 0700 10/18 0701  10/19 0700    P.O. 360 200 200    I.V. (mL/kg)       IV Piggyback       Total Intake(mL/kg) 360 (3.2) 200 (1.8) 200 (1.8)    Urine (mL/kg/hr) 600 (0.2) 850 (0.3) 275 (0.6)    Stool  0     Total Output 600 850 275    Net -240 -650 -75           Unmeasured Urine Occurrence 1 x  1 x    Unmeasured Stool Occurrence  0 x           Physical Exam  General:  no acute distress at this time  Skin:  No acute rash  Eyes:  No scleral icterus and noninjected  ENT:  mucous membranes moist  Neck:  no carotid bruits  Chest:  Clear to auscultation percussion, good respiratory effort, no use of accessory respiratory muscles  CVS:  Regular rate and rhythm without rub   Abdomen:  soft and nontender   Extremities: lower extremity edema  Neuro:  No gross focality  Psych:  Alert , cooperative     Medications:    Current Facility-Administered Medications:     acetaminophen (TYLENOL) tablet 650 mg, 650 mg, Oral, Q6H PRN, Macey Almeida PA-C, 650 mg at 10/17/24 1422    aspirin (ECOTRIN LOW STRENGTH) EC tablet 81 mg, 81 mg, Oral, Daily, Rose Miner MD, 81 mg at 10/18/24 0929    atorvastatin (LIPITOR) tablet 40 mg, 40 mg, Oral, QPM, Rose Miner MD, 40 mg at 10/17/24 1657    buPROPion (WELLBUTRIN) tablet 100 mg, 100 mg, Oral, BID, Rose Miner MD,  100 mg at 10/18/24 0929    ceFEPime (MAXIPIME) 2,000 mg in dextrose 5 % 50 mL IVPB, 2,000 mg, Intravenous, Q24H, Peyton Saunders PA-C, Last Rate: 100 mL/hr at 10/18/24 0934, 2,000 mg at 10/18/24 0934    chlorhexidine (PERIDEX) 0.12 % oral rinse 15 mL, 15 mL, Mouth/Throat, Q12H MIGEL, Rose Miner MD, 15 mL at 10/17/24 2124    clopidogrel (PLAVIX) tablet 75 mg, 75 mg, Oral, Daily, Rose Miner MD, 75 mg at 10/18/24 0929    heparin (porcine) subcutaneous injection 7,500 Units, 7,500 Units, Subcutaneous, Q8H MIGEL, Rose Miner MD, 7,500 Units at 10/18/24 0609    hydrALAZINE (APRESOLINE) injection 10 mg, 10 mg, Intravenous, Q6H PRN, Rose Miner MD, 10 mg at 10/17/24 1430    insulin lispro (HumALOG/ADMELOG) 100 units/mL subcutaneous injection 2-12 Units, 2-12 Units, Subcutaneous, TID AC, 4 Units at 10/16/24 1139 **AND** Fingerstick Glucose (POCT), , , TID AC, Rose Miner MD    labetalol (NORMODYNE) tablet 200 mg, 200 mg, Oral, BID, Joselyn Reyes Bahamonde, MD, 200 mg at 10/18/24 0929    NIFEdipine (PROCARDIA XL) 24 hr tablet 90 mg, 90 mg, Oral, Daily, Joselyn Reyes Bahamonde, MD, 90 mg at 10/18/24 0929    OLANZapine (ZyPREXA) tablet 10 mg, 10 mg, Oral, HS, Rose Miner MD, 10 mg at 10/17/24 2124    pantoprazole (PROTONIX) EC tablet 40 mg, 40 mg, Oral, Early Morning, Rose Miner MD, 40 mg at 10/18/24 0609    polyethylene glycol (MIRALAX) packet 17 g, 17 g, Oral, Daily PRN, Rose Miner MD    torsemide (DEMADEX) tablet 10 mg, 10 mg, Oral, Daily, Joselyn Reyes Bahamonde, MD, 10 mg at 10/18/24 0929    traZODone (DESYREL) tablet 50 mg, 50 mg, Oral, HS PRN, Rose Miner MD    Valbenazine Tosylate CAPS 40 mg, 40 mg, Oral, HS, Rose Miner MD, 40 mg at 10/17/24 2124    vancomycin (VANCOCIN) 1,250 mg in sodium chloride 0.9 % 250 mL IVPB, 15 mg/kg (Adjusted), Intravenous, Once PRN, Rose Miner MD    vancomycin (VANCOCIN) 2,000 mg in sodium chloride 0.9 % 500 mL IVPB, 25 mg/kg (Adjusted), Intravenous, Once, Peyton Saunders,  "PA-C, Last Rate: 250 mL/hr at 10/18/24 0946, 2,000 mg at 10/18/24 0946      Lab Results: I have reviewed the following results:  Results from last 7 days   Lab Units 10/18/24  0644 10/17/24  0557 10/17/24  0459 10/16/24  0504 10/15/24  0553 10/14/24  2312 10/14/24  2311   WBC Thousand/uL 8.28 8.35  --  7.27 8.06  --  8.76   HEMOGLOBIN g/dL 10.3* 10.9*  --  10.8* 10.9*  --  12.1   HEMATOCRIT % 31.5* 32.7*  --  32.8* 32.1*  --  37.1   PLATELETS Thousands/uL 212 194  --  177 168  --  212   POTASSIUM mmol/L 4.6  --  5.1 4.1 4.1 4.2  --    CHLORIDE mmol/L 112*  --  112* 110* 106 104  --    CO2 mmol/L 22  --  18* 22 22 22  --    BUN mg/dL 33*  --  26* 27* 32* 29*  --    CREATININE mg/dL 4.19*  --  3.52* 3.27* 3.08* 3.24*  --    CALCIUM mg/dL 8.8  --  8.7 8.5 8.2* 8.2*  --    MAGNESIUM mg/dL  --   --  2.1 2.0 1.6*  --   --    PHOSPHORUS mg/dL  --   --  3.8 4.0 4.2  --   --    ALBUMIN g/dL  --   --  3.0*  --   --  2.9*  --        Administrative Statements     Portions of the record may have been created with voice recognition software. Occasional wrong word or \"sound a like\" substitutions may have occurred due to the inherent limitations of voice recognition software. Read the chart carefully and recognize, using context, where substitutions have occurred.If you have any questions, please contact the dictating provider.  "

## 2024-10-18 NOTE — PLAN OF CARE
Problem: PAIN - ADULT  Goal: Verbalizes/displays adequate comfort level or baseline comfort level  Description: Interventions:  - Encourage patient to monitor pain and request assistance  - Assess pain using appropriate pain scale  - Administer analgesics based on type and severity of pain and evaluate response  - Implement non-pharmacological measures as appropriate and evaluate response  - Consider cultural and social influences on pain and pain management  - Notify physician/advanced practitioner if interventions unsuccessful or patient reports new pain  Outcome: Progressing     Problem: INFECTION - ADULT  Goal: Absence or prevention of progression during hospitalization  Description: INTERVENTIONS:  - Assess and monitor for signs and symptoms of infection  - Monitor lab/diagnostic results  - Monitor all insertion sites, i.e. indwelling lines, tubes, and drains  - Monitor endotracheal if appropriate and nasal secretions for changes in amount and color  - [place] appropriate cooling/warming therapies per order  - Administer medications as ordered  - Instruct and encourage patient and family to use good hand hygiene technique  - Identify and instruct in appropriate isolation precautions for identified infection/condition  Outcome: Progressing  Goal: Absence of fever/infection during neutropenic period  Description: INTERVENTIONS:  - Monitor WBC    Outcome: Progressing     Problem: SAFETY ADULT  Goal: Patient will remain free of falls  Description: INTERVENTIONS:  - Educate patient/family on patient safety including physical limitations  - Instruct patient to call for assistance with activity   - Consult OT/PT to assist with strengthening/mobility   - Keep Call bell within reach  - Keep bed low and locked with side rails adjusted as appropriate  - Keep care items and personal belongings within reach  - Initiate and maintain comfort rounds  - Make Fall Risk Sign visible to staff  - Offer Toileting every 2 Hours,  in advance of need  - Initiate/Maintain bed alarm  - Obtain necessary fall risk management equipment  - Apply yellow socks and bracelet for high fall risk patients  - Consider moving patient to room near nurses station  Outcome: Progressing  Goal: Maintain or return to baseline ADL function  Description: INTERVENTIONS:  -  Assess patient's ability to carry out ADLs; assess patient's baseline for ADL function and identify physical deficits which impact ability to perform ADLs (bathing, care of mouth/teeth, toileting, grooming, dressing, etc.)  - Assess/evaluate cause of self-care deficits   - Assess range of motion  - Assess patient's mobility; develop plan if impaired  - Assess patient's need for assistive devices and provide as appropriate  - Encourage maximum independence but intervene and supervise when necessary  - Involve family in performance of ADLs  - Assess for home care needs following discharge   - Consider OT consult to assist with ADL evaluation and planning for discharge  - Provide patient education as appropriate  Outcome: Progressing  Goal: Maintains/Returns to pre admission functional level  Description: INTERVENTIONS:  - Perform AM-PAC 6 Click Basic Mobility/ Daily Activity assessment daily.  - Set and communicate daily mobility goal to care team and patient/family/caregiver.   - Collaborate with rehabilitation services on mobility goals if consulted  - Reposition patient every 2 hours.  - Record patient progress and toleration of activity level   Outcome: Progressing     Problem: DISCHARGE PLANNING  Goal: Discharge to home or other facility with appropriate resources  Description: INTERVENTIONS:  - Identify barriers to discharge w/patient and caregiver  - Arrange for needed discharge resources and transportation as appropriate  - Identify discharge learning needs (meds, wound care, etc.)  - Arrange for interpretive services to assist at discharge as needed  - Refer to Case Management Department  for coordinating discharge planning if the patient needs post-hospital services based on physician/advanced practitioner order or complex needs related to functional status, cognitive ability, or social support system  Outcome: Progressing     Problem: Knowledge Deficit  Goal: Patient/family/caregiver demonstrates understanding of disease process, treatment plan, medications, and discharge instructions  Description: Complete learning assessment and assess knowledge base.  Interventions:  - Provide teaching at level of understanding  - Provide teaching via preferred learning methods  Outcome: Progressing     Problem: Neurological Deficit  Goal: Neurological status is stable or improving  Description: Interventions:  - Monitor and assess patient's level of consciousness, motor function, sensory function, and level of assistance needed for ADLs.   - Monitor and report changes from baseline. Collaborate with interdisciplinary team to initiate plan and implement interventions as ordered.   - Provide and maintain a safe environment.  - Consider seizure precautions.  - Consider fall precautions.  - Consider aspiration precautions.  - Consider bleeding precautions.  Outcome: Progressing     Problem: Activity Intolerance/Impaired Mobility  Goal: Mobility/activity is maintained at optimum level for patient  Description: Interventions:  - Assess and monitor patient  barriers to mobility and need for assistive/adaptive devices.  - Assess patient's emotional response to limitations.  - Collaborate with interdisciplinary team and initiate plans and interventions as ordered.  - Encourage independent activity per ability.  - Maintain proper body alignment.  - Perform active/passive rom as tolerated/ordered.  - Plan activities to conserve energy.  - Turn patient as appropriate  Outcome: Progressing     Problem: Communication Impairment  Goal: Ability to express needs and understand communication  Description: Assess patient's  communication skills and ability to understand information.  Patient will demonstrate use of effective communication techniques, alternative methods of communication and understanding even if not able to speak.     - Encourage communication and provide alternate methods of communication as needed.  - Collaborate with case management/ for discharge needs.  - Include patient/family/caregiver in decisions related to communication.  Outcome: Progressing     Problem: Potential for Aspiration  Goal: Non-ventilated patient's risk of aspiration is minimized  Description: Assess and monitor vital signs, respiratory status, and labs (WBC).  Monitor for signs of aspiration (tachypnea, cough, rales, wheezing, cyanosis, fever).    - Assess and monitor patient's ability to swallow.  - Place patient up in chair to eat if possible.  - HOB up at 90 degrees to eat if unable to get patient up into chair.  - Supervise patient during oral intake.   - Instruct patient/ family to take small bites.  - Instruct patient/ family to take small single sips when taking liquids.  - Follow patient-specific strategies generated by speech pathologist.  Outcome: Progressing  Goal: Ventilated patient's risk of aspiration is minimized  Description: Assess and monitor vital signs, respiratory status, airway cuff pressure, and labs (WBC).  Monitor for signs of aspiration (tachypnea, cough, rales, wheezing, cyanosis, fever).    - Elevate head of bed 30 degrees if patient has tube feeding.  - Monitor tube feeding.  Outcome: Progressing     Problem: Nutrition  Goal: Nutrition/Hydration status is improving  Description: Monitor and assess patient's nutrition/hydration status for malnutrition (ex- brittle hair, bruises, dry skin, pale skin and conjunctiva, muscle wasting, smooth red tongue, and disorientation). Collaborate with interdisciplinary team and initiate plan and interventions as ordered.  Monitor patient's weight and dietary intake as  ordered or per policy. Utilize nutrition screening tool and intervene per policy. Determine patient's food preferences and provide high-protein, high-caloric foods as appropriate.     - Assist patient with eating.  - Allow adequate time for meals.  - Encourage patient to take dietary supplement as ordered.  - Collaborate with clinical nutritionist.  - Include patient/family/caregiver in decisions related to nutrition.  Outcome: Progressing     Problem: Prexisting or High Potential for Compromised Skin Integrity  Goal: Skin integrity is maintained or improved  Description: INTERVENTIONS:  - Identify patients at risk for skin breakdown  - Assess and monitor skin integrity  - Assess and monitor nutrition and hydration status  - Monitor labs   - Assess for incontinence   - Turn and reposition patient  - Assist with mobility/ambulation  - Relieve pressure over bony prominences  - Avoid friction and shearing  - Provide appropriate hygiene as needed including keeping skin clean and dry  - Evaluate need for skin moisturizer/barrier cream  - Collaborate with interdisciplinary team   - Patient/family teaching  - Consider wound care consult   Outcome: Progressing

## 2024-10-18 NOTE — SPEECH THERAPY NOTE
Speech Language/Pathology    Speech/Language Pathology Progress Note    Patient Name: Tommie Taylor  Today's Date: 10/18/2024     Problem List  Principal Problem:    CVA (cerebral vascular accident) ([facility])  Active Problems:    Bipolar I disorder, severe, current or most recent episode depressed, with psychotic features ([facility])    GERD (gastroesophageal reflux disease)    Hyperlipidemia    Tardive dyskinesia    Hypertensive emergency    Type 2 diabetes mellitus with hyperglycemia, without long-term current use of insulin ([facility])    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 ([facility])    ROSINA (acute kidney injury) ([facility])    Encephalopathy    Volume overload    Acid-base disorder, mixed    SIRS (systemic inflammatory response syndrome) ([facility])       Past Medical History  Past Medical History:   Diagnosis Date    ADHD, adult residual type     Anxiety     Anxiety     Bipolar 1 disorder ([facility])     Cataract     Left eye    Chronic kidney disease     Colon polyp     CPAP (continuous positive airway pressure) dependence     Depression     Depression     Diabetes mellitus ([facility])     blood sugar 167 on admission    Equinus deformity of foot     GERD (gastroesophageal reflux disease)     Homicidal ideations     Hyperlipidemia     Hypertension     Microalbuminuria     Morbid obesity ([facility])     Neuropathy     Shortness of breath     Sleep apnea     CPAP at bedtime    Sleep apnea     Stroke ([facility])     Suicidal intent         Past Surgical History  Past Surgical History:   Procedure Laterality Date    CATARACT EXTRACTION      CATARACT EXTRACTION      COLONOSCOPY      HAND SURGERY Right     OTHER SURGICAL HISTORY      REPAIR OF SUPERFICIAL WOUND FACE    MT ESOPHAGOGASTRODUODENOSCOPY TRANSORAL DIAGNOSTIC N/A 06/14/2018    Procedure: ESOPHAGOGASTRODUODENOSCOPY (EGD);  Surgeon: Yinka Maier MD;  Location: [facility];  Service: Gastroenterology    MT ESOPHAGOGASTRODUODENOSCOPY TRANSORAL DIAGNOSTIC N/A  09/12/2018    Procedure: EGD AND COLONOSCOPY;  Surgeon: Yinka Maier MD;  Location: [facility];  Service: Gastroenterology         Subjective:  Pt lethargic upright in bed.    Objective:  Pt seen for dysphagia tx at bedside. IE recommending puree/NTL. Pt stating current dislike of dysphagia diet. Trials of soft solid and thin liquids initiated. Prolonged mastication and mastication fatigue noted with soft solids. Mod lingual residue after pt stating he swallowed with all soft solid trials. Cued liquid wash for elimination of residue. Immediate coughing x3 noted with trials of thin liquids. No overt s/s of aspiration with NTL.     Assessment:  Reduced oral sensory response/poor mastication. S/s of aspiration with thin liquids. Tolerating puree/NTL diet without difficulty or s/s of aspiration. Education provided to pt on current diet recommendations and aspiration precautions- pt in agreeance.     Plan/Recommendations:  Continue with recommended diet of puree/NTL. Aspiration precautions. Lingual sweep on right side to reduce residue as well as liquid washes as needed.

## 2024-10-19 ENCOUNTER — APPOINTMENT (INPATIENT)
Dept: CT IMAGING | Facility: HOSPITAL | Age: 59
DRG: 061 | End: 2024-10-19
Payer: MEDICARE

## 2024-10-19 LAB
ANION GAP SERPL CALCULATED.3IONS-SCNC: 5 MMOL/L (ref 4–13)
BUN SERPL-MCNC: 36 MG/DL (ref 5–25)
CALCIUM SERPL-MCNC: 8.5 MG/DL (ref 8.4–10.2)
CHLORIDE SERPL-SCNC: 113 MMOL/L (ref 96–108)
CO2 SERPL-SCNC: 23 MMOL/L (ref 21–32)
CREAT SERPL-MCNC: 4.38 MG/DL (ref 0.6–1.3)
ERYTHROCYTE [DISTWIDTH] IN BLOOD BY AUTOMATED COUNT: 14.8 % (ref 11.6–15.1)
GFR SERPL CREATININE-BSD FRML MDRD: 13 ML/MIN/1.73SQ M
GLUCOSE SERPL-MCNC: 138 MG/DL (ref 65–140)
GLUCOSE SERPL-MCNC: 154 MG/DL (ref 65–140)
GLUCOSE SERPL-MCNC: 157 MG/DL (ref 65–140)
GLUCOSE SERPL-MCNC: 158 MG/DL (ref 65–140)
GLUCOSE SERPL-MCNC: 181 MG/DL (ref 65–140)
HCT VFR BLD AUTO: 30.8 % (ref 36.5–49.3)
HGB BLD-MCNC: 10.1 G/DL (ref 12–17)
MCH RBC QN AUTO: 26 PG (ref 26.8–34.3)
MCHC RBC AUTO-ENTMCNC: 32.8 G/DL (ref 31.4–37.4)
MCV RBC AUTO: 79 FL (ref 82–98)
PLATELET # BLD AUTO: 206 THOUSANDS/UL (ref 149–390)
PMV BLD AUTO: 9.8 FL (ref 8.9–12.7)
POTASSIUM SERPL-SCNC: 4.7 MMOL/L (ref 3.5–5.3)
PROCALCITONIN SERPL-MCNC: 0.29 NG/ML
RBC # BLD AUTO: 3.88 MILLION/UL (ref 3.88–5.62)
SODIUM SERPL-SCNC: 141 MMOL/L (ref 135–147)
VANCOMYCIN SERPL-MCNC: 17.8 UG/ML (ref 10–20)
WBC # BLD AUTO: 7.54 THOUSAND/UL (ref 4.31–10.16)

## 2024-10-19 PROCEDURE — 74176 CT ABD & PELVIS W/O CONTRAST: CPT

## 2024-10-19 PROCEDURE — 80202 ASSAY OF VANCOMYCIN: CPT | Performed by: STUDENT IN AN ORGANIZED HEALTH CARE EDUCATION/TRAINING PROGRAM

## 2024-10-19 PROCEDURE — 82948 REAGENT STRIP/BLOOD GLUCOSE: CPT

## 2024-10-19 PROCEDURE — 71250 CT THORAX DX C-: CPT

## 2024-10-19 PROCEDURE — 80048 BASIC METABOLIC PNL TOTAL CA: CPT | Performed by: INTERNAL MEDICINE

## 2024-10-19 PROCEDURE — 99232 SBSQ HOSP IP/OBS MODERATE 35: CPT | Performed by: INTERNAL MEDICINE

## 2024-10-19 PROCEDURE — 99232 SBSQ HOSP IP/OBS MODERATE 35: CPT | Performed by: STUDENT IN AN ORGANIZED HEALTH CARE EDUCATION/TRAINING PROGRAM

## 2024-10-19 PROCEDURE — 84145 PROCALCITONIN (PCT): CPT | Performed by: INTERNAL MEDICINE

## 2024-10-19 PROCEDURE — 85027 COMPLETE CBC AUTOMATED: CPT | Performed by: INTERNAL MEDICINE

## 2024-10-19 RX ADMIN — ATORVASTATIN CALCIUM 40 MG: 40 TABLET, FILM COATED ORAL at 18:05

## 2024-10-19 RX ADMIN — LABETALOL HYDROCHLORIDE 200 MG: 100 TABLET, FILM COATED ORAL at 18:05

## 2024-10-19 RX ADMIN — TORSEMIDE 20 MG: 20 TABLET ORAL at 08:51

## 2024-10-19 RX ADMIN — ASPIRIN 81 MG: 81 TABLET, COATED ORAL at 08:51

## 2024-10-19 RX ADMIN — INSULIN LISPRO 2 UNITS: 100 INJECTION, SOLUTION INTRAVENOUS; SUBCUTANEOUS at 12:38

## 2024-10-19 RX ADMIN — PANTOPRAZOLE SODIUM 40 MG: 40 TABLET, DELAYED RELEASE ORAL at 04:38

## 2024-10-19 RX ADMIN — HEPARIN SODIUM 7500 UNITS: 5000 INJECTION INTRAVENOUS; SUBCUTANEOUS at 04:37

## 2024-10-19 RX ADMIN — CHLORHEXIDINE GLUCONATE 15 ML: 1.2 RINSE ORAL at 21:37

## 2024-10-19 RX ADMIN — CEFEPIME 2000 MG: 2 INJECTION, POWDER, FOR SOLUTION INTRAVENOUS at 08:50

## 2024-10-19 RX ADMIN — HEPARIN SODIUM 7500 UNITS: 5000 INJECTION INTRAVENOUS; SUBCUTANEOUS at 21:37

## 2024-10-19 RX ADMIN — VALBENAZINE 40 MG: 40 CAPSULE ORAL at 21:39

## 2024-10-19 RX ADMIN — OLANZAPINE 10 MG: 10 TABLET, FILM COATED ORAL at 21:37

## 2024-10-19 RX ADMIN — INSULIN LISPRO 2 UNITS: 100 INJECTION, SOLUTION INTRAVENOUS; SUBCUTANEOUS at 18:08

## 2024-10-19 RX ADMIN — BUPROPION HYDROCHLORIDE TABLETS 100 MG: 100 TABLET, FILM COATED ORAL at 08:50

## 2024-10-19 RX ADMIN — HEPARIN SODIUM 7500 UNITS: 5000 INJECTION INTRAVENOUS; SUBCUTANEOUS at 14:11

## 2024-10-19 RX ADMIN — CHLORHEXIDINE GLUCONATE 15 ML: 1.2 RINSE ORAL at 08:51

## 2024-10-19 RX ADMIN — NIFEDIPINE 90 MG: 30 TABLET, FILM COATED, EXTENDED RELEASE ORAL at 08:51

## 2024-10-19 RX ADMIN — LABETALOL HYDROCHLORIDE 200 MG: 100 TABLET, FILM COATED ORAL at 08:51

## 2024-10-19 RX ADMIN — CLOPIDOGREL BISULFATE 75 MG: 75 TABLET ORAL at 08:51

## 2024-10-19 RX ADMIN — BUPROPION HYDROCHLORIDE TABLETS 100 MG: 100 TABLET, FILM COATED ORAL at 18:05

## 2024-10-19 NOTE — ASSESSMENT & PLAN NOTE
Etiology suggested to be secondary to ATN versus TMA in the setting of uncontrolled blood pressure and complicated by contrast associated nephropathy   Baseline creatinine around 2.9  Current creatinine 4.38, trending up but starting to plateau   UA with microhematuria, proteinuria  US kidney and bladder pending   No indication of dialysis at this time per nephrology  Avoid nephrotoxic agents  Monitor I&Os  Daily BMP  Nephrology following, input is appreciated

## 2024-10-19 NOTE — ASSESSMENT & PLAN NOTE
Volume: hypervolemia improving  Blood pressure: Normotensive, /66mmhg, better controlled  Recommend:  Continue low-sodium diet   Continue labetalol 200 mg twice daily  Continue nifedipine 90 mg daily  Torsemide 20 mg daily

## 2024-10-19 NOTE — ASSESSMENT & PLAN NOTE
Noted on 10/17/24 for tachycardia and fever  UA negative for infection  CXR: No acute cardiopulmonary disease  Lactic acid WNL  Procalcitonin 0.29-> 0.29  BC 1/2 positive for gram + rods. Identification panel inconclusive. Follow up culture results  Unclear source of infection. Will obtain CT C/A/P wo contrast given ROSINA  Continue IV cefepime and vancomycin (2)   Trend WBC and fever curve

## 2024-10-19 NOTE — ASSESSMENT & PLAN NOTE
Lab Results   Component Value Date    EGFR 13 10/19/2024    EGFR 14 10/18/2024    EGFR 18 10/17/2024    CREATININE 4.38 (H) 10/19/2024    CREATININE 4.19 (H) 10/18/2024    CREATININE 3.52 (H) 10/17/2024   Calcium 8.5 mg/dL  Phosphorus 3.8mg/dL, goal Less 5.5  No indication of binders

## 2024-10-19 NOTE — ASSESSMENT & PLAN NOTE
Lab Results   Component Value Date    EGFR 13 10/19/2024    EGFR 14 10/18/2024    EGFR 18 10/17/2024    CREATININE 4.38 (H) 10/19/2024    CREATININE 4.19 (H) 10/18/2024    CREATININE 3.52 (H) 10/17/2024     Baseline creatinine around 2.9  See plan under ROSINA

## 2024-10-19 NOTE — PROGRESS NOTES
Progress Note - Nephrology   Name: Tommie Taylor 59 y.o. male I MRN: [id number]  Unit/Bed#: S -01 I Date of Admission: 10/14/2024   Date of Service: 10/19/2024 I Hospital Day: 4     Assessment & Plan  ROSINA (acute kidney injury) (HCC)  Etiology: likely secondary to ATN vs TMA in the settings of uncontrolled  complicated with contrast associated nephropathy  Baseline creatinine: Around 2.9  Current creatinine: 4.38 mg/dL, seems to be plateauing   Peak creatinine: Trending  UA: Microhematuria, proteinuria  Renal imaging: Ultrasound ordered   Treatment:  No urgent indication of dialysis at this time.  We will continue to monitor kidney function closely   Maintain MAP:  Over 65 mmHg if possible/avoid hypoperfusion:  Hold parameters on blood pressure medications  Avoid nephrotoxic agents such as NSAIDs, and IV contrast if possible. 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 I have discussed the above management plan in detail  with the primary service.     Subjective   Brief History of Admission - 60 yo man with PMH of obesity, CKD G3 B, hypertension p/w right-sided weakness. Nephrology is consulted for management of ROSINA     Patient denies shortness of breath, no chest pain.  BP better controlled    Objective :  Temp:  [98.6 °F (37 °C)-99.2 °F (37.3 °C)] 98.6 °F (37 °C)  HR:  [85-89] 85  BP: (112-191)/(65-95) 112/65  Resp:  [18-20] 18  SpO2:  [96 %-98 %] 98 %  O2 Device: None (Room air)    Current Weight: Weight - Scale: 107 kg (235 lb 14.3 oz)  First Weight: Weight - Scale: 133 kg (292 lb 5.3 oz)  I/O         10/17 0701  10/18 0700 10/18 0701  10/19 0700    P.O. 200 440    Total Intake(mL/kg) 200 (1.8) 440 (4.1)    Urine (mL/kg/hr) 850 (0.3) 1450 (0.6)    Stool 0     Total Output 850 1450    Net -650 -1010          Unmeasured Urine Occurrence  1 x    Unmeasured Stool Occurrence 0 x           Physical Exam  General:  no acute distress at this time  Skin:  No acute rash  Eyes:  No scleral icterus and noninjected  ENT:  mucous membranes moist  Neck:  no carotid bruits  Chest:  Clear to auscultation percussion, good respiratory effort, no use of accessory respiratory muscles  CVS:  Regular rate and rhythm without rub   Abdomen:  soft and nontender   Extremities: lower extremity edema  Neuro:  No gross focality  Psych:  Alert , cooperative     Medications:    Current Facility-Administered Medications:     acetaminophen (TYLENOL) tablet 650 mg, 650 mg, Oral, Q6H PRN, Macey Almeida PA-C, 650 mg at 10/17/24 1422    aspirin (ECOTRIN LOW STRENGTH) EC tablet 81 mg, 81 mg, Oral, Daily, Rose Miner MD, 81 mg at 10/18/24 0929    atorvastatin (LIPITOR) tablet 40 mg, 40 mg, Oral, QPM, Rose Miner MD, 40 mg at 10/18/24 1719    buPROPion (WELLBUTRIN) tablet 100 mg, 100 mg, Oral, BID, Rose Miner MD, 100 mg at 10/18/24 1719    ceFEPime (MAXIPIME) 2,000 mg in dextrose 5 % 50 mL IVPB, 2,000 mg, Intravenous, Q24H, Peyton Saunders PA-C, Last Rate: 100  mL/hr at 10/18/24 0934, 2,000 mg at 10/18/24 0934    chlorhexidine (PERIDEX) 0.12 % oral rinse 15 mL, 15 mL, Mouth/Throat, Q12H MIGEL, Rose Miner MD, 15 mL at 10/18/24 2131    clopidogrel (PLAVIX) tablet 75 mg, 75 mg, Oral, Daily, Rose Miner MD, 75 mg at 10/18/24 0929    heparin (porcine) subcutaneous injection 7,500 Units, 7,500 Units, Subcutaneous, Q8H MIGEL, Rose Miner MD, 7,500 Units at 10/19/24 0437    hydrALAZINE (APRESOLINE) injection 10 mg, 10 mg, Intravenous, Q6H PRN, Rose Miner MD, 10 mg at 10/17/24 1430    insulin lispro (HumALOG/ADMELOG) 100 units/mL subcutaneous injection 2-12 Units, 2-12 Units, Subcutaneous, TID AC, 4 Units at 10/16/24 1139 **AND** Fingerstick Glucose (POCT), , , TID AC, Rose Miner MD    labetalol (NORMODYNE) tablet 200 mg, 200 mg, Oral, BID, Joselyn Reyes Bahamonde, MD, 200 mg at 10/18/24 1719    NIFEdipine (PROCARDIA XL) 24 hr tablet 90 mg, 90 mg, Oral, Daily, Joselyn Reyes Bahamonde, MD, 90 mg at 10/18/24 0929    OLANZapine (ZyPREXA) tablet 10 mg, 10 mg, Oral, HS, Rose Miner MD, 10 mg at 10/18/24 2131    pantoprazole (PROTONIX) EC tablet 40 mg, 40 mg, Oral, Early Morning, Rose Miner MD, 40 mg at 10/19/24 0438    polyethylene glycol (MIRALAX) packet 17 g, 17 g, Oral, Daily PRN, Rose Miner MD    torsemide (DEMADEX) tablet 20 mg, 20 mg, Oral, Daily, Joselyn Reyes Bahamonde, MD    traZODone (DESYREL) tablet 50 mg, 50 mg, Oral, HS PRN, Rose Miner MD    Valbenazine Tosylate CAPS 40 mg, 40 mg, Oral, HS, Rose Miner MD, 40 mg at 10/18/24 2152    vancomycin (VANCOCIN) 1,250 mg in sodium chloride 0.9 % 250 mL IVPB, 15 mg/kg (Adjusted), Intravenous, Once PRN, Rose Miner MD      Lab Results: I have reviewed the following results:  Results from last 7 days   Lab Units 10/19/24  0447 10/18/24  0644 10/17/24  0557 10/17/24  0459 10/16/24  0504 10/15/24  0553 10/14/24  2312 10/14/24  2311   WBC Thousand/uL 7.54 8.28 8.35  --  7.27 8.06  --  8.76   HEMOGLOBIN g/dL 10.1*  "10.3* 10.9*  --  10.8* 10.9*  --  12.1   HEMATOCRIT % 30.8* 31.5* 32.7*  --  32.8* 32.1*  --  37.1   PLATELETS Thousands/uL 206 212 194  --  177 168  --  212   POTASSIUM mmol/L 4.7 4.6  --  5.1 4.1 4.1 4.2  --    CHLORIDE mmol/L 113* 112*  --  112* 110* 106 104  --    CO2 mmol/L 23 22  --  18* 22 22 22  --    BUN mg/dL 36* 33*  --  26* 27* 32* 29*  --    CREATININE mg/dL 4.38* 4.19*  --  3.52* 3.27* 3.08* 3.24*  --    CALCIUM mg/dL 8.5 8.8  --  8.7 8.5 8.2* 8.2*  --    MAGNESIUM mg/dL  --   --   --  2.1 2.0 1.6*  --   --    PHOSPHORUS mg/dL  --   --   --  3.8 4.0 4.2  --   --    ALBUMIN g/dL  --   --   --  3.0*  --   --  2.9*  --        Administrative Statements     Portions of the record may have been created with voice recognition software. Occasional wrong word or \"sound a like\" substitutions may have occurred due to the inherent limitations of voice recognition software. Read the chart carefully and recognize, using context, where substitutions have occurred.If you have any questions, please contact the dictating provider.  "

## 2024-10-19 NOTE — ASSESSMENT & PLAN NOTE
Hypertensive emergency on admission in the setting of likely CVA  Evidenced by bp 197/104->231/105 POA  S/p Cardizem drip 10/15  PTA medications: Norvasc 10 mg and labetalol 100 mg twice daily.   Appreciate nephro assistance in BP management   Discontinue Norvasc  Increased nifedipine 90mg daily  Increased labetolol 200mg daily  Bps noted to be improving today. Continue regimen as above

## 2024-10-19 NOTE — PLAN OF CARE
Problem: PAIN - ADULT  Goal: Verbalizes/displays adequate comfort level or baseline comfort level  Description: Interventions:  - Encourage patient to monitor pain and request assistance  - Assess pain using appropriate pain scale  - Administer analgesics based on type and severity of pain and evaluate response  - Implement non-pharmacological measures as appropriate and evaluate response  - Consider cultural and social influences on pain and pain management  - Notify physician/advanced practitioner if interventions unsuccessful or patient reports new pain  Outcome: Progressing     Problem: INFECTION - ADULT  Goal: Absence or prevention of progression during hospitalization  Description: INTERVENTIONS:  - Assess and monitor for signs and symptoms of infection  - Monitor lab/diagnostic results  - Monitor all insertion sites, i.e. indwelling lines, tubes, and drains  - Monitor endotracheal if appropriate and nasal secretions for changes in amount and color  - Sharon appropriate cooling/warming therapies per order  - Administer medications as ordered  - Instruct and encourage patient and family to use good hand hygiene technique  - Identify and instruct in appropriate isolation precautions for identified infection/condition  Outcome: Progressing  Goal: Absence of fever/infection during neutropenic period  Description: INTERVENTIONS:  - Monitor WBC    Outcome: Progressing     Problem: SAFETY ADULT  Goal: Patient will remain free of falls  Description: INTERVENTIONS:  - Educate patient/family on patient safety including physical limitations  - Instruct patient to call for assistance with activity   - Consult OT/PT to assist with strengthening/mobility   - Keep Call bell within reach  - Keep bed low and locked with side rails adjusted as appropriate  - Keep care items and personal belongings within reach  - Initiate and maintain comfort rounds  - Make Fall Risk Sign visible to staff  - Offer Toileting every 2 Hours,  in advance of need  - Initiate/Maintain bed alarm  - Obtain necessary fall risk management equipment: bed alarm  - Apply yellow socks and bracelet for high fall risk patients  - Consider moving patient to room near nurses station  Outcome: Progressing  Goal: Maintain or return to baseline ADL function  Description: INTERVENTIONS:  -  Assess patient's ability to carry out ADLs; assess patient's baseline for ADL function and identify physical deficits which impact ability to perform ADLs (bathing, care of mouth/teeth, toileting, grooming, dressing, etc.)  - Assess/evaluate cause of self-care deficits   - Assess range of motion  - Assess patient's mobility; develop plan if impaired  - Assess patient's need for assistive devices and provide as appropriate  - Encourage maximum independence but intervene and supervise when necessary  - Involve family in performance of ADLs  - Assess for home care needs following discharge   - Consider OT consult to assist with ADL evaluation and planning for discharge  - Provide patient education as appropriate  Outcome: Progressing  Goal: Maintains/Returns to pre admission functional level  Description: INTERVENTIONS:  - Perform AM-PAC 6 Click Basic Mobility/ Daily Activity assessment daily.  - Set and communicate daily mobility goal to care team and patient/family/caregiver.   - Collaborate with rehabilitation services on mobility goals if consulted  - Perform Range of Motion 3 times a day.  - Reposition patient every 2 hours.  - Dangle patient 3 times a day  - Stand patient 3 times a day  - Ambulate patient 3 times a day  - Out of bed to chair 3 times a day   - Out of bed for meals 3 times a day  - Out of bed for toileting  - Record patient progress and toleration of activity level   Outcome: Progressing     Problem: DISCHARGE PLANNING  Goal: Discharge to home or other facility with appropriate resources  Description: INTERVENTIONS:  - Identify barriers to discharge w/patient and  caregiver  - Arrange for needed discharge resources and transportation as appropriate  - Identify discharge learning needs (meds, wound care, etc.)  - Arrange for interpretive services to assist at discharge as needed  - Refer to Case Management Department for coordinating discharge planning if the patient needs post-hospital services based on physician/advanced practitioner order or complex needs related to functional status, cognitive ability, or social support system  Outcome: Progressing     Problem: Knowledge Deficit  Goal: Patient/family/caregiver demonstrates understanding of disease process, treatment plan, medications, and discharge instructions  Description: Complete learning assessment and assess knowledge base.  Interventions:  - Provide teaching at level of understanding  - Provide teaching via preferred learning methods  Outcome: Progressing     Problem: Neurological Deficit  Goal: Neurological status is stable or improving  Description: Interventions:  - Monitor and assess patient's level of consciousness, motor function, sensory function, and level of assistance needed for ADLs.   - Monitor and report changes from baseline. Collaborate with interdisciplinary team to initiate plan and implement interventions as ordered.   - Provide and maintain a safe environment.  - Consider seizure precautions.  - Consider fall precautions.  - Consider aspiration precautions.  - Consider bleeding precautions.  Outcome: Progressing     Problem: Activity Intolerance/Impaired Mobility  Goal: Mobility/activity is maintained at optimum level for patient  Description: Interventions:  - Assess and monitor patient  barriers to mobility and need for assistive/adaptive devices.  - Assess patient's emotional response to limitations.  - Collaborate with interdisciplinary team and initiate plans and interventions as ordered.  - Encourage independent activity per ability.  - Maintain proper body alignment.  - Perform  active/passive rom as tolerated/ordered.  - Plan activities to conserve energy.  - Turn patient as appropriate  Outcome: Progressing     Problem: Communication Impairment  Goal: Ability to express needs and understand communication  Description: Assess patient's communication skills and ability to understand information.  Patient will demonstrate use of effective communication techniques, alternative methods of communication and understanding even if not able to speak.     - Encourage communication and provide alternate methods of communication as needed.  - Collaborate with case management/ for discharge needs.  - Include patient/family/caregiver in decisions related to communication.  Outcome: Progressing     Problem: Potential for Aspiration  Goal: Non-ventilated patient's risk of aspiration is minimized  Description: Assess and monitor vital signs, respiratory status, and labs (WBC).  Monitor for signs of aspiration (tachypnea, cough, rales, wheezing, cyanosis, fever).    - Assess and monitor patient's ability to swallow.  - Place patient up in chair to eat if possible.  - HOB up at 90 degrees to eat if unable to get patient up into chair.  - Supervise patient during oral intake.   - Instruct patient/ family to take small bites.  - Instruct patient/ family to take small single sips when taking liquids.  - Follow patient-specific strategies generated by speech pathologist.  Outcome: Progressing  Goal: Ventilated patient's risk of aspiration is minimized  Description: Assess and monitor vital signs, respiratory status, airway cuff pressure, and labs (WBC).  Monitor for signs of aspiration (tachypnea, cough, rales, wheezing, cyanosis, fever).    - Elevate head of bed 30 degrees if patient has tube feeding.  - Monitor tube feeding.  Outcome: Progressing     Problem: Nutrition  Goal: Nutrition/Hydration status is improving  Description: Monitor and assess patient's nutrition/hydration status for  malnutrition (ex- brittle hair, bruises, dry skin, pale skin and conjunctiva, muscle wasting, smooth red tongue, and disorientation). Collaborate with interdisciplinary team and initiate plan and interventions as ordered.  Monitor patient's weight and dietary intake as ordered or per policy. Utilize nutrition screening tool and intervene per policy. Determine patient's food preferences and provide high-protein, high-caloric foods as appropriate.     - Assist patient with eating.  - Allow adequate time for meals.  - Encourage patient to take dietary supplement as ordered.  - Collaborate with clinical nutritionist.  - Include patient/family/caregiver in decisions related to nutrition.  Outcome: Progressing

## 2024-10-19 NOTE — ASSESSMENT & PLAN NOTE
Noted to be more lethargic on exam 10/17   VBG without retention, ammonia WNL   New onset fevers Tmax 100.3. Infectious work up ordered. Temperatures improving today 99.1F  Possibly in the setting of underlying infection, continue antibiotics as below  Neuro checks  Patient appears more awake today. AAO x3 on exam

## 2024-10-19 NOTE — PROGRESS NOTES
Progress Note - Hospitalist   Name: Tommie Taylor 59 y.o. male I MRN: [id number]  Unit/Bed#: S -01 I Date of Admission: 10/14/2024   Date of Service: 10/19/2024 I Hospital Day: 4    Assessment & Plan  CVA (cerebral vascular accident) (HCC)  Patient presented with right upper and right lower extremity weakness with associated right facial droop, highly suggestive of CVA  CT/CTA head unremarkable for acute process  TnK administered following correction of hypertension with nicardipine drip in the ICU  Following thrombolytic administration patient developed worsening slurred speech and headache, had repeat CT head which was unremarkable  Repeat head CT at 24 hours post TNK negative for signs of bleed  Neurology consult  MRI significant for left corona radiata stroke.  Placed on DAPT therapy for 21 days and then continue aspirin monotherapy indefinitely per neurology.  Started on Lipitor 40 mg per stroke protocol  Recommend Zio patch outpatient to evaluate for any possible arrhythmia  Will need f/u with neurovascular team in 6-8 weeks   10/17 patient with worsening lethargy, alert and oriented x 2.  STAT CT head unremarkable - see encephalopathy for further plan   PT/OT recommending level 1  SIRS (systemic inflammatory response syndrome) (HCC)  Noted on 10/17/24 for tachycardia and fever  UA negative for infection  CXR: No acute cardiopulmonary disease  Lactic acid WNL  Procalcitonin 0.29-> 0.29  BC 1/2 positive for gram + rods. Identification panel inconclusive. Follow up culture results  Unclear source of infection. Will obtain CT C/A/P wo contrast given ROSINA  Continue IV cefepime and vancomycin (2)   Trend WBC and fever curve  Bipolar I disorder, severe, current or most recent episode depressed, with psychotic features (HCC)  History of Bipolar disorder, follow with psych  Continue Zyprexa, buproprion  GERD (gastroesophageal reflux disease)  Continue home dose PPI  Hyperlipidemia  Continue home statin  therapy  Tardive dyskinesia  Continue valbenazine  Hypertensive emergency  Hypertensive emergency on admission in the setting of likely CVA  Evidenced by bp 197/104->231/105 POA  S/p Cardizem drip 10/15  PTA medications: Norvasc 10 mg and labetalol 100 mg twice daily.   Appreciate nephro assistance in BP management   Discontinue Norvasc  Increased nifedipine 90mg daily  Increased labetolol 200mg daily  Bps noted to be improving today. 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 Infectious work up ordered. Temperatures improving today 99.1F  Possibly in the setting of underlying infection, continue antibiotics as below  Neuro checks  Patient appears more awake today. AAO x3 on exam  ROSINA (acute kidney injury) (HCC)  Etiology suggested to be secondary to ATN versus TMA in the setting of uncontrolled blood pressure and complicated by contrast associated nephropathy   Baseline creatinine around 2.9  Current creatinine 4.38, trending up but starting to plateau   UA with microhematuria, proteinuria  US kidney  and bladder pending   No indication of dialysis at this time per nephrology  Avoid nephrotoxic agents  Monitor I&Os  Daily BMP  Nephrology following, input is appreciated    VTE Pharmacologic Prophylaxis:   High Risk (Score >/= 5) - Pharmacological DVT Prophylaxis Ordered: heparin. Sequential Compression Devices Ordered.    Mobility:   Basic Mobility Inpatient Raw Score: 7  JH-HLM Goal: 2: Bed activities/Dependent transfer  JH-HLM Achieved: 2: Bed activities/Dependent transfer  JH-HLM Goal NOT achieved. Continue with multidisciplinary rounding and encourage appropriate mobility to improve upon JH-HLM goals.    Patient Centered Rounds: I performed bedside rounds with nursing staff today.   Discussions with Specialists or Other Care Team Provider: Nursing, Nephrology    Education and Discussions with Family / Patient: Updated  (wife) via phone.    Current Length of Stay: 4 day(s)  Current Patient Status: Inpatient   Certification Statement: The patient will continue to require additional inpatient hospital stay due to ROSINA, SIRS  Discharge Plan: Anticipate discharge in 48-72 hrs to rehab facility.    Code Status: Level 1 - Full Code    Subjective   Patient lying in bed under no acute distress. Answering all questions appropriately. Denies any pain or other acute complaints. Appetite is not great.     Objective :  Temp:  [98.6 °F (37 °C)-98.9 °F (37.2 °C)] 98.9 °F (37.2 °C)  HR:  [84-89] 84  BP: (112-136)/(65-72) 136/66  Resp:  [18] 18  SpO2:  [96 %-98 %] 96 %  O2 Device: None (Room air)    Body mass index is 38.07 kg/m².     Input and Output Summary (last 24 hours):     Intake/Output Summary (Last 24 hours) at 10/19/2024 0932  Last data filed at 10/19/2024 0900  Gross per 24 hour   Intake 300 ml   Output 1175 ml   Net -875 ml       Physical Exam  Vitals and nursing note reviewed.   Constitutional:       General: He is not in acute distress.     Appearance: He is well-developed. He is obese.   HENT:       Head: Normocephalic and atraumatic.   Eyes:      Conjunctiva/sclera: Conjunctivae normal.   Cardiovascular:      Rate and Rhythm: Normal rate and regular rhythm.      Heart sounds: No murmur heard.  Pulmonary:      Effort: Pulmonary effort is normal. No respiratory distress.      Breath sounds: Normal breath sounds.   Abdominal:      Palpations: Abdomen is soft.      Tenderness: There is no abdominal tenderness.   Musculoskeletal:         General: No swelling.      Cervical back: Neck supple.   Skin:     General: Skin is warm and dry.      Capillary Refill: Capillary refill takes less than 2 seconds.   Neurological:      Mental Status: He is alert and oriented to person, place, and time.      Comments: Residual aphasia, RUE and RLE weakness post CVA   Psychiatric:         Mood and Affect: Mood normal.           Lines/Drains:              Lab Results: I have reviewed the following results:   Results from last 7 days   Lab Units 10/19/24  0447 10/17/24  0557 10/16/24  0504   WBC Thousand/uL 7.54   < > 7.27   HEMOGLOBIN g/dL 10.1*   < > 10.8*   HEMATOCRIT % 30.8*   < > 32.8*   PLATELETS Thousands/uL 206   < > 177   SEGS PCT %  --   --  63   LYMPHO PCT %  --   --  23   MONO PCT %  --   --  9   EOS PCT %  --   --  4    < > = values in this interval not displayed.     Results from last 7 days   Lab Units 10/19/24  0447 10/18/24  0644 10/17/24  0459   SODIUM mmol/L 141   < > 138   POTASSIUM mmol/L 4.7   < > 5.1   CHLORIDE mmol/L 113*   < > 112*   CO2 mmol/L 23   < > 18*   BUN mg/dL 36*   < > 26*   CREATININE mg/dL 4.38*   < > 3.52*   ANION GAP mmol/L 5   < > 8   CALCIUM mg/dL 8.5   < > 8.7   ALBUMIN g/dL  --   --  3.0*   TOTAL BILIRUBIN mg/dL  --   --  0.43   ALK PHOS U/L  --   --  69   ALT U/L  --   --  9   AST U/L  --   --  21   GLUCOSE RANDOM mg/dL 158*   < > 152*    < > = values in this interval not displayed.     Results from last 7 days   Lab Units 10/17/24  0459   INR  1.13     Results from last 7 days   Lab Units  10/19/24  0735 10/18/24  2103 10/18/24  1132 10/18/24  0753 10/17/24  2048 10/17/24  1552 10/17/24  1045 10/17/24  0745 10/16/24  1729 10/16/24  1122 10/16/24  0027 10/15/24  1722   POC GLUCOSE mg/dl 138 176* 166* 137 174* 149* 140 141* 118 232* 148* 106     Results from last 7 days   Lab Units 10/16/24  0504 10/15/24  0553   HEMOGLOBIN A1C % 8.6* 8.1*     Results from last 7 days   Lab Units 10/19/24  0447 10/18/24  0644 10/17/24  1522 10/17/24  0459   LACTIC ACID mmol/L  --   --  0.8  --    PROCALCITONIN ng/ml 0.29* 0.29*  --  0.20       Recent Cultures (last 7 days):   Results from last 7 days   Lab Units 10/17/24  1522 10/17/24  1027   BLOOD CULTURE  No Growth at 24 hrs.  --    GRAM STAIN RESULT   --  Gram positive rods*       Imaging Results Review: I reviewed radiology reports from this admission including: CT chest, CT abdomen/pelvis, and Ultrasound(s).  Other Study Results Review: No additional pertinent studies reviewed.    Last 24 Hours Medication List:     Current Facility-Administered Medications:     acetaminophen (TYLENOL) tablet 650 mg, Q6H PRN    aspirin (ECOTRIN LOW STRENGTH) EC tablet 81 mg, Daily    atorvastatin (LIPITOR) tablet 40 mg, QPM    buPROPion (WELLBUTRIN) tablet 100 mg, BID    ceFEPime (MAXIPIME) 2,000 mg in dextrose 5 % 50 mL IVPB, Q24H, Last Rate: 2,000 mg (10/19/24 0850)    chlorhexidine (PERIDEX) 0.12 % oral rinse 15 mL, Q12H MIGEL    clopidogrel (PLAVIX) tablet 75 mg, Daily    heparin (porcine) subcutaneous injection 7,500 Units, Q8H MIGEL    hydrALAZINE (APRESOLINE) injection 10 mg, Q6H PRN    insulin lispro (HumALOG/ADMELOG) 100 units/mL subcutaneous injection 2-12 Units, TID AC **AND** Fingerstick Glucose (POCT), TID AC    labetalol (NORMODYNE) tablet 200 mg, BID    NIFEdipine (PROCARDIA XL) 24 hr tablet 90 mg, Daily    OLANZapine (ZyPREXA) tablet 10 mg, HS    pantoprazole (PROTONIX) EC tablet 40 mg, Early Morning    polyethylene glycol (MIRALAX) packet 17 g, Daily PRN    torsemide  (DEMADEX) tablet 20 mg, Daily    traZODone (DESYREL) tablet 50 mg, HS PRN    Valbenazine Tosylate CAPS 40 mg, HS    vancomycin (VANCOCIN) 1,250 mg in sodium chloride 0.9 % 250 mL IVPB, Once PRN    Administrative Statements   Today, Patient Was Seen By: Peyton Saunders PA-C  I have spent a total time of 30 minutes in caring for this patient on the day of the visit/encounter including Diagnostic results, Risks and benefits of tx options, Instructions for management, Patient and family education, Counseling / Coordination of care, Documenting in the medical record, Reviewing / ordering tests, medicine, procedures  , Obtaining or reviewing history  , and Communicating with other healthcare professionals .    **Please Note: This note may have been constructed using a voice recognition system.**

## 2024-10-19 NOTE — PROGRESS NOTES
Tommie Taylor is a 59 y.o. male who is currently ordered Vancomycin IV with management by the Pharmacy Consult service.  Relevant clinical data and objective / subjective history reviewed.  Vancomycin Assessment:  Indication and Goal AUC/Trough: Other, FUO, -600, trough >10  Clinical Status: stable  Micro:     Renal Function:  SCr: 4.38 mg/dL  CrCl: 19.5 mL/min  Renal replacement: Not on dialysis  Days of Therapy: 2  Current Dose:  1250mg IV x 1 dose PRN vanco level < 15  Vancomycin Plan:  New Dosing: Continue current dose  Estimated AUC: 400-600 mcg*hr/mL  Estimated Trough: >10 mcg/mL  Next Level: 10/20 @ 0600  Renal Function Monitoring: Daily BMP and UOP  Pharmacy will continue to follow closely for s/sx of nephrotoxicity, infusion reactions and appropriateness of therapy.  BMP and CBC will be ordered per protocol. We will continue to follow the patient’s culture results and clinical progress daily.    Ewa Kaur, Pharmacist

## 2024-10-19 NOTE — ASSESSMENT & PLAN NOTE
Lab Results   Component Value Date    HGBA1C 8.6 (H) 10/16/2024       Recent Labs     10/18/24  0753 10/18/24  1132 10/18/24  2103 10/19/24  0735   POCGLU 137 166* 176* 138       Blood Sugar Average: Last 72 hrs:  (P) [id number]    Currently maintained on semaglutide monotherapy - hold on admission   SSI with accu checks  Hypoglycemia protocol  Diabetic diet

## 2024-10-19 NOTE — ASSESSMENT & PLAN NOTE
Etiology: likely secondary to ATN vs TMA in the settings of uncontrolled  complicated with contrast associated nephropathy  Baseline creatinine: Around 2.9  Current creatinine: 4.38 mg/dL, seems to be plateauing   Peak creatinine: Trending  UA: Microhematuria, proteinuria  Renal imaging: Ultrasound ordered   Treatment:  No urgent indication of dialysis at this time.  We will continue to monitor kidney function closely   Maintain MAP:  Over 65 mmHg if possible/avoid hypoperfusion:  Hold parameters on blood pressure medications  Avoid nephrotoxic agents such as NSAIDs, and IV contrast if possible. Avoid opioids   Adjust medications to GFR

## 2024-10-20 LAB
ALL TARGETS: NOT DETECTED
ANION GAP SERPL CALCULATED.3IONS-SCNC: 6 MMOL/L (ref 4–13)
BACTERIA BLD CULT: ABNORMAL
BUN SERPL-MCNC: 36 MG/DL (ref 5–25)
CALCIUM SERPL-MCNC: 8.6 MG/DL (ref 8.4–10.2)
CHLORIDE SERPL-SCNC: 112 MMOL/L (ref 96–108)
CO2 SERPL-SCNC: 23 MMOL/L (ref 21–32)
CREAT SERPL-MCNC: 4.43 MG/DL (ref 0.6–1.3)
ERYTHROCYTE [DISTWIDTH] IN BLOOD BY AUTOMATED COUNT: 14.6 % (ref 11.6–15.1)
GFR SERPL CREATININE-BSD FRML MDRD: 13 ML/MIN/1.73SQ M
GLUCOSE SERPL-MCNC: 114 MG/DL (ref 65–140)
GLUCOSE SERPL-MCNC: 116 MG/DL (ref 65–140)
GLUCOSE SERPL-MCNC: 129 MG/DL (ref 65–140)
GLUCOSE SERPL-MCNC: 135 MG/DL (ref 65–140)
GLUCOSE SERPL-MCNC: 137 MG/DL (ref 65–140)
GRAM STN SPEC: ABNORMAL
HCT VFR BLD AUTO: 28.7 % (ref 36.5–49.3)
HGB BLD-MCNC: 9.3 G/DL (ref 12–17)
MCH RBC QN AUTO: 25.9 PG (ref 26.8–34.3)
MCHC RBC AUTO-ENTMCNC: 32.4 G/DL (ref 31.4–37.4)
MCV RBC AUTO: 80 FL (ref 82–98)
PLATELET # BLD AUTO: 178 THOUSANDS/UL (ref 149–390)
PMV BLD AUTO: 10.1 FL (ref 8.9–12.7)
POTASSIUM SERPL-SCNC: 4.4 MMOL/L (ref 3.5–5.3)
PROCALCITONIN SERPL-MCNC: 0.26 NG/ML
RBC # BLD AUTO: 3.59 MILLION/UL (ref 3.88–5.62)
SODIUM SERPL-SCNC: 141 MMOL/L (ref 135–147)
VANCOMYCIN SERPL-MCNC: 12 UG/ML (ref 10–20)
WBC # BLD AUTO: 6.86 THOUSAND/UL (ref 4.31–10.16)

## 2024-10-20 PROCEDURE — 80202 ASSAY OF VANCOMYCIN: CPT | Performed by: STUDENT IN AN ORGANIZED HEALTH CARE EDUCATION/TRAINING PROGRAM

## 2024-10-20 PROCEDURE — 80048 BASIC METABOLIC PNL TOTAL CA: CPT | Performed by: STUDENT IN AN ORGANIZED HEALTH CARE EDUCATION/TRAINING PROGRAM

## 2024-10-20 PROCEDURE — 82948 REAGENT STRIP/BLOOD GLUCOSE: CPT

## 2024-10-20 PROCEDURE — 85027 COMPLETE CBC AUTOMATED: CPT | Performed by: INTERNAL MEDICINE

## 2024-10-20 PROCEDURE — 84145 PROCALCITONIN (PCT): CPT | Performed by: INTERNAL MEDICINE

## 2024-10-20 PROCEDURE — 99232 SBSQ HOSP IP/OBS MODERATE 35: CPT | Performed by: INTERNAL MEDICINE

## 2024-10-20 PROCEDURE — 99232 SBSQ HOSP IP/OBS MODERATE 35: CPT | Performed by: STUDENT IN AN ORGANIZED HEALTH CARE EDUCATION/TRAINING PROGRAM

## 2024-10-20 RX ADMIN — CEFEPIME 2000 MG: 2 INJECTION, POWDER, FOR SOLUTION INTRAVENOUS at 08:44

## 2024-10-20 RX ADMIN — BUPROPION HYDROCHLORIDE TABLETS 100 MG: 100 TABLET, FILM COATED ORAL at 17:22

## 2024-10-20 RX ADMIN — LABETALOL HYDROCHLORIDE 200 MG: 100 TABLET, FILM COATED ORAL at 17:22

## 2024-10-20 RX ADMIN — VALBENAZINE 40 MG: 40 CAPSULE ORAL at 22:13

## 2024-10-20 RX ADMIN — CHLORHEXIDINE GLUCONATE 15 ML: 1.2 RINSE ORAL at 08:44

## 2024-10-20 RX ADMIN — ASPIRIN 81 MG: 81 TABLET, COATED ORAL at 08:44

## 2024-10-20 RX ADMIN — VANCOMYCIN HYDROCHLORIDE 1250 MG: 5 INJECTION, POWDER, LYOPHILIZED, FOR SOLUTION INTRAVENOUS at 09:22

## 2024-10-20 RX ADMIN — TORSEMIDE 20 MG: 20 TABLET ORAL at 08:44

## 2024-10-20 RX ADMIN — NIFEDIPINE 90 MG: 30 TABLET, FILM COATED, EXTENDED RELEASE ORAL at 08:44

## 2024-10-20 RX ADMIN — CLOPIDOGREL BISULFATE 75 MG: 75 TABLET ORAL at 08:44

## 2024-10-20 RX ADMIN — BUPROPION HYDROCHLORIDE TABLETS 100 MG: 100 TABLET, FILM COATED ORAL at 08:44

## 2024-10-20 RX ADMIN — LABETALOL HYDROCHLORIDE 200 MG: 100 TABLET, FILM COATED ORAL at 08:44

## 2024-10-20 RX ADMIN — HEPARIN SODIUM 7500 UNITS: 5000 INJECTION INTRAVENOUS; SUBCUTANEOUS at 22:08

## 2024-10-20 RX ADMIN — ATORVASTATIN CALCIUM 40 MG: 40 TABLET, FILM COATED ORAL at 17:22

## 2024-10-20 RX ADMIN — HEPARIN SODIUM 7500 UNITS: 5000 INJECTION INTRAVENOUS; SUBCUTANEOUS at 04:42

## 2024-10-20 RX ADMIN — HEPARIN SODIUM 7500 UNITS: 5000 INJECTION INTRAVENOUS; SUBCUTANEOUS at 14:50

## 2024-10-20 RX ADMIN — PANTOPRAZOLE SODIUM 40 MG: 40 TABLET, DELAYED RELEASE ORAL at 04:42

## 2024-10-20 RX ADMIN — OLANZAPINE 10 MG: 10 TABLET, FILM COATED ORAL at 22:08

## 2024-10-20 NOTE — ASSESSMENT & PLAN NOTE
Noted on 10/17/24 for tachycardia and fever  UA negative for infection  CXR: No acute cardiopulmonary disease  Lactic acid WNL  Procalcitonin 0.29-> 0.29->0.26  BC 1/2 positive for gram + rods. Identification panel inconclusive. Follow up culture results. Likely contaminant   Unclear source of infection  Follow up CT C/A/P wo contrast given ROSINA. No evidence of acute infectious process  Given afebrile 24 hours and no clear source of infection. Will discontinue IV cefepime and vancomycin today 10/20/24. And monitor off for 24 hours.  Trend WBC and fever curve

## 2024-10-20 NOTE — PLAN OF CARE
Problem: PAIN - ADULT  Goal: Verbalizes/displays adequate comfort level or baseline comfort level  Description: Interventions:  - Encourage patient to monitor pain and request assistance  - Assess pain using appropriate pain scale  - Administer analgesics based on type and severity of pain and evaluate response  - Implement non-pharmacological measures as appropriate and evaluate response  - Consider cultural and social influences on pain and pain management  - Notify physician/advanced practitioner if interventions unsuccessful or patient reports new pain  Outcome: Progressing     Problem: INFECTION - ADULT  Goal: Absence or prevention of progression during hospitalization  Description: INTERVENTIONS:  - Assess and monitor for signs and symptoms of infection  - Monitor lab/diagnostic results  - Monitor all insertion sites, i.e. indwelling lines, tubes, and drains  - Monitor endotracheal if appropriate and nasal secretions for changes in amount and color  - [place] appropriate cooling/warming therapies per order  - Administer medications as ordered  - Instruct and encourage patient and family to use good hand hygiene technique  - Identify and instruct in appropriate isolation precautions for identified infection/condition  Outcome: Progressing  Goal: Absence of fever/infection during neutropenic period  Description: INTERVENTIONS:  - Monitor WBC    Outcome: Progressing     Problem: SAFETY ADULT  Goal: Patient will remain free of falls  Description: INTERVENTIONS:  - Educate patient/family on patient safety including physical limitations  - Instruct patient to call for assistance with activity   - Consult OT/PT to assist with strengthening/mobility   - Keep Call bell within reach  - Keep bed low and locked with side rails adjusted as appropriate  - Keep care items and personal belongings within reach  - Initiate and maintain comfort rounds  - Make Fall Risk Sign visible to staff  - Offer Toileting every 2 Hours,  in advance of need  - Initiate/Maintain bed alarm  - Obtain necessary fall risk management equipment: bed alarm  - Apply yellow socks and bracelet for high fall risk patients  - Consider moving patient to room near nurses station  Outcome: Progressing  Goal: Maintain or return to baseline ADL function  Description: INTERVENTIONS:  -  Assess patient's ability to carry out ADLs; assess patient's baseline for ADL function and identify physical deficits which impact ability to perform ADLs (bathing, care of mouth/teeth, toileting, grooming, dressing, etc.)  - Assess/evaluate cause of self-care deficits   - Assess range of motion  - Assess patient's mobility; develop plan if impaired  - Assess patient's need for assistive devices and provide as appropriate  - Encourage maximum independence but intervene and supervise when necessary  - Involve family in performance of ADLs  - Assess for home care needs following discharge   - Consider OT consult to assist with ADL evaluation and planning for discharge  - Provide patient education as appropriate  Outcome: Progressing  Goal: Maintains/Returns to pre admission functional level  Description: INTERVENTIONS:  - Perform AM-PAC 6 Click Basic Mobility/ Daily Activity assessment daily.  - Set and communicate daily mobility goal to care team and patient/family/caregiver.   - Collaborate with rehabilitation services on mobility goals if consulted  - Perform Range of Motion 3 times a day.  - Reposition patient every 2 hours.  - Dangle patient 3 times a day  - Stand patient 3 times a day  - Ambulate patient 3 times a day  - Out of bed to chair 3 times a day   - Out of bed for meals 3 times a day  - Out of bed for toileting  - Record patient progress and toleration of activity level   Outcome: Progressing     Problem: DISCHARGE PLANNING  Goal: Discharge to home or other facility with appropriate resources  Description: INTERVENTIONS:  - Identify barriers to discharge w/patient and  caregiver  - Arrange for needed discharge resources and transportation as appropriate  - Identify discharge learning needs (meds, wound care, etc.)  - Arrange for interpretive services to assist at discharge as needed  - Refer to Case Management Department for coordinating discharge planning if the patient needs post-hospital services based on physician/advanced practitioner order or complex needs related to functional status, cognitive ability, or social support system  Outcome: Progressing     Problem: Knowledge Deficit  Goal: Patient/family/caregiver demonstrates understanding of disease process, treatment plan, medications, and discharge instructions  Description: Complete learning assessment and assess knowledge base.  Interventions:  - Provide teaching at level of understanding  - Provide teaching via preferred learning methods  Outcome: Progressing     Problem: Activity Intolerance/Impaired Mobility  Goal: Mobility/activity is maintained at optimum level for patient  Description: Interventions:  - Assess and monitor patient  barriers to mobility and need for assistive/adaptive devices.  - Assess patient's emotional response to limitations.  - Collaborate with interdisciplinary team and initiate plans and interventions as ordered.  - Encourage independent activity per ability.  - Maintain proper body alignment.  - Perform active/passive rom as tolerated/ordered.  - Plan activities to conserve energy.  - Turn patient as appropriate  Outcome: Progressing     Problem: Communication Impairment  Goal: Ability to express needs and understand communication  Description: Assess patient's communication skills and ability to understand information.  Patient will demonstrate use of effective communication techniques, alternative methods of communication and understanding even if not able to speak.     - Encourage communication and provide alternate methods of communication as needed.  - Collaborate with case  management/ for discharge needs.  - Include patient/family/caregiver in decisions related to communication.  Outcome: Progressing     Problem: Potential for Aspiration  Goal: Non-ventilated patient's risk of aspiration is minimized  Description: Assess and monitor vital signs, respiratory status, and labs (WBC).  Monitor for signs of aspiration (tachypnea, cough, rales, wheezing, cyanosis, fever).    - Assess and monitor patient's ability to swallow.  - Place patient up in chair to eat if possible.  - HOB up at 90 degrees to eat if unable to get patient up into chair.  - Supervise patient during oral intake.   - Instruct patient/ family to take small bites.  - Instruct patient/ family to take small single sips when taking liquids.  - Follow patient-specific strategies generated by speech pathologist.  Outcome: Progressing  Goal: Ventilated patient's risk of aspiration is minimized  Description: Assess and monitor vital signs, respiratory status, airway cuff pressure, and labs (WBC).  Monitor for signs of aspiration (tachypnea, cough, rales, wheezing, cyanosis, fever).    - Elevate head of bed 30 degrees if patient has tube feeding.  - Monitor tube feeding.  Outcome: Progressing     Problem: Nutrition  Goal: Nutrition/Hydration status is improving  Description: Monitor and assess patient's nutrition/hydration status for malnutrition (ex- brittle hair, bruises, dry skin, pale skin and conjunctiva, muscle wasting, smooth red tongue, and disorientation). Collaborate with interdisciplinary team and initiate plan and interventions as ordered.  Monitor patient's weight and dietary intake as ordered or per policy. Utilize nutrition screening tool and intervene per policy. Determine patient's food preferences and provide high-protein, high-caloric foods as appropriate.     - Assist patient with eating.  - Allow adequate time for meals.  - Encourage patient to take dietary supplement as ordered.  - Collaborate  with clinical nutritionist.  - Include patient/family/caregiver in decisions related to nutrition.  Outcome: Progressing

## 2024-10-20 NOTE — ASSESSMENT & PLAN NOTE
Etiology suggested to be secondary to ATN versus TMA in the setting of uncontrolled blood pressure and complicated by contrast associated nephropathy   Baseline creatinine around 2.9  Current creatinine 4.43, starting to plateau   UA with microhematuria, proteinuria  US kidney and bladder pending   No indication of dialysis at this time per nephrology  Avoid nephrotoxic agents  Monitor I&Os  Daily BMP  Nephrology following, input is appreciated. Stable from nephrology standpoint. Follow up after discharge

## 2024-10-20 NOTE — ASSESSMENT & PLAN NOTE
Lab Results   Component Value Date    EGFR 13 10/20/2024    EGFR 13 10/19/2024    EGFR 14 10/18/2024    CREATININE 4.43 (H) 10/20/2024    CREATININE 4.38 (H) 10/19/2024    CREATININE 4.19 (H) 10/18/2024     Baseline creatinine around 2.9  See plan under ROSINA

## 2024-10-20 NOTE — ASSESSMENT & PLAN NOTE
Nonoliguric ROSINA on CKD G4 with no evidence of kidney recovery  etiology: Likely secondary to ATN versus TMA in the settings of uncontrolled hypertensioncomplicated with contrast associated nephropathy   Baseline creatinine: Around 2.9  Current creatinine: Plateauing at 4.4 mg/dL  Peak creatinine: Trending  UA: Microhematuria, proteinuria  Renal imaging: Ultrasound ordered   Treatment:  No urgent indication of dialysis at this time   Patient will require follow-up with nephrology on discharge    Maintain MAP:  Over 65 mmHg if possible/avoid hypoperfusion:  Hold parameters on blood pressure medications  Avoid nephrotoxic agents such as NSAIDs, and IV contrast if possible. Avoid opioids   Adjust medications to GFR

## 2024-10-20 NOTE — PROGRESS NOTES
Progress Note - Hospitalist   Name: Tommie Taylor 59 y.o. male I MRN: [id number]  Unit/Bed#: S -01 I Date of Admission: 10/14/2024   Date of Service: 10/20/2024 I Hospital Day: 5    Assessment & Plan  CVA (cerebral vascular accident) (HCC)  Patient presented with right upper and right lower extremity weakness with associated right facial droop, highly suggestive of CVA  CT/CTA head unremarkable for acute process  TnK administered following correction of hypertension with nicardipine drip in the ICU  Following thrombolytic administration patient developed worsening slurred speech and headache, had repeat CT head which was unremarkable  Repeat head CT at 24 hours post TNK negative for signs of bleed  Neurology consult  MRI significant for left corona radiata stroke.  Placed on DAPT therapy for 21 days and then continue aspirin monotherapy indefinitely per neurology.  Started on Lipitor 40 mg per stroke protocol  Recommend Zio patch outpatient to evaluate for any possible arrhythmia  Will need f/u with neurovascular team in 6-8 weeks   10/17 patient with worsening lethargy, alert and oriented x 2.  STAT CT head unremarkable - see encephalopathy for further plan   PT/OT recommending level 1  SIRS (systemic inflammatory response syndrome) ([facility])  Noted on 10/17/24 for tachycardia and fever  UA negative for infection  CXR: No acute cardiopulmonary disease  Lactic acid WNL  Procalcitonin 0.29-> 0.29->0.26  BC 1/2 positive for gram + rods. Identification panel inconclusive. Follow up culture results. Likely contaminant   Unclear source of infection  Follow up CT C/A/P wo contrast given ROSINA. No evidence of acute infectious process  Given afebrile 24 hours and no clear source of infection. Will discontinue IV cefepime and vancomycin today 10/20/24. And monitor off for 24 hours.  Trend WBC and fever curve  Bipolar I disorder, severe, current or most recent episode depressed, with psychotic features (HCC)  History of  Bipolar disorder, follow with psych  Continue Zyprexa, buproprion  GERD (gastroesophageal reflux disease)  Continue home dose PPI  Hyperlipidemia  Continue home statin therapy  Tardive dyskinesia  Continue valbenazine  Hypertensive emergency  Hypertensive emergency on admission in the setting of likely CVA  Evidenced by bp 197/104->231/105 POA  S/p Cardizem drip 10/15  PTA medications: Norvasc 10 mg and labetalol 100 mg twice daily.   Appreciate nephro assistance in BP management   Discontinue Norvasc  Increased nifedipine 90mg daily  Increased labetolol 200mg daily  Bps noted to be improving today. 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 Infectious work up ordered.  Possibly in the setting of underlying infection  Neuro checks  Improving  ROSINA (acute kidney injury) (HCC)  Etiology suggested to be secondary to ATN versus TMA in the setting of uncontrolled blood pressure and complicated by contrast associated nephropathy   Baseline creatinine around 2.9  Current creatinine 4.43, starting to plateau   UA with  microhematuria, proteinuria  US kidney and bladder pending   No indication of dialysis at this time per nephrology  Avoid nephrotoxic agents  Monitor I&Os  Daily BMP  Nephrology following, input is appreciated. Stable from nephrology standpoint. Follow up after discharge  Stage 4 chronic kidney disease (HCC)  Lab Results   Component Value Date    EGFR 13 10/20/2024    EGFR 13 10/19/2024    EGFR 14 10/18/2024    CREATININE 4.43 (H) 10/20/2024    CREATININE 4.38 (H) 10/19/2024    CREATININE 4.19 (H) 10/18/2024       VTE Pharmacologic Prophylaxis:   High Risk (Score >/= 5) - Pharmacological DVT Prophylaxis Ordered: heparin. Sequential Compression Devices Ordered.    Mobility:   Basic Mobility Inpatient Raw Score: 7  JH-HLM Goal: 2: Bed activities/Dependent transfer  JH-HLM Achieved: 2: Bed activities/Dependent transfer  JH-HLM Goal NOT achieved. Continue with multidisciplinary rounding and encourage appropriate mobility to improve upon JH-HLM goals.    Patient Centered Rounds: I performed bedside rounds with nursing staff today.   Discussions with Specialists or Other Care Team Provider: Nursing, Nephrology    Education and Discussions with Family / Patient: Attempted to update  (wife) via phone. Left voicemail.     Current Length of Stay: 5 day(s)  Current Patient Status: Inpatient   Certification Statement: The patient will continue to require additional inpatient hospital stay due to SIRS  Discharge Plan: Anticipate discharge in 24-48 hrs to rehab facility.    Code Status: Level 1 - Full Code    Subjective   Patient lying in bed. He is alert and answering questions appropriately. Denies any new or worsening complaints.     Objective :  Temp:  [98.3 °F (36.8 °C)] 98.3 °F (36.8 °C)  HR:  [71-84] 84  BP: (132-146)/(79-84) 146/80  Resp:  [18-20] 20  SpO2:  [98 %-99 %] 98 %  O2 Device: None (Room air)    Body mass index is 38.25 kg/m².     Input and Output Summary (last 24 hours):     Intake/Output Summary  (Last 24 hours) at 10/20/2024 1049  Last data filed at 10/20/2024 0301  Gross per 24 hour   Intake 900 ml   Output 1900 ml   Net -1000 ml       Physical Exam  Vitals and nursing note reviewed.   Constitutional:       General: He is not in acute distress.     Appearance: He is well-developed. He is obese. He is ill-appearing.   HENT:      Head: Normocephalic and atraumatic.   Eyes:      Conjunctiva/sclera: Conjunctivae normal.   Cardiovascular:      Rate and Rhythm: Normal rate and regular rhythm.      Heart sounds: No murmur heard.  Pulmonary:      Effort: Pulmonary effort is normal. No respiratory distress.      Breath sounds: Normal breath sounds.   Abdominal:      Palpations: Abdomen is soft.      Tenderness: There is no abdominal tenderness.   Musculoskeletal:         General: No swelling.      Cervical back: Neck supple.   Skin:     General: Skin is warm and dry.      Capillary Refill: Capillary refill takes less than 2 seconds.   Neurological:      Mental Status: He is alert and oriented to person, place, and time.      Comments: Residual aphasia and RUE/RLE weakness. No new acute neuro deficits   Psychiatric:         Mood and Affect: Mood normal.           Lines/Drains:              Lab Results: I have reviewed the following results:   Results from last 7 days   Lab Units 10/20/24  0440 10/17/24  0557 10/16/24  0504   WBC Thousand/uL 6.86   < > 7.27   HEMOGLOBIN g/dL 9.3*   < > 10.8*   HEMATOCRIT % 28.7*   < > 32.8*   PLATELETS Thousands/uL 178   < > 177   SEGS PCT %  --   --  63   LYMPHO PCT %  --   --  23   MONO PCT %  --   --  9   EOS PCT %  --   --  4    < > = values in this interval not displayed.     Results from last 7 days   Lab Units 10/20/24  0809 10/18/24  0644 10/17/24  0459   SODIUM mmol/L 141   < > 138   POTASSIUM mmol/L 4.4   < > 5.1   CHLORIDE mmol/L 112*   < > 112*   CO2 mmol/L 23   < > 18*   BUN mg/dL 36*   < > 26*   CREATININE mg/dL 4.43*   < > 3.52*   ANION GAP mmol/L 6   < > 8   CALCIUM  mg/dL 8.6   < > 8.7   ALBUMIN g/dL  --   --  3.0*   TOTAL BILIRUBIN mg/dL  --   --  0.43   ALK PHOS U/L  --   --  69   ALT U/L  --   --  9   AST U/L  --   --  21   GLUCOSE RANDOM mg/dL 116   < > 152*    < > = values in this interval not displayed.     Results from last 7 days   Lab Units 10/17/24  0459   INR  1.13     Results from last 7 days   Lab Units 10/20/24  0745 10/19/24  2125 10/19/24  1535 10/19/24  1110 10/19/24  0735 10/18/24  2103 10/18/24  1132 10/18/24  0753 10/17/24  2048 10/17/24  1552 10/17/24  1045 10/17/24  0745   POC GLUCOSE mg/dl 114 157* 154* 181* 138 176* 166* 137 174* 149* 140 141*     Results from last 7 days   Lab Units 10/16/24  0504 10/15/24  0553   HEMOGLOBIN A1C % 8.6* 8.1*     Results from last 7 days   Lab Units 10/20/24  0440 10/19/24  0447 10/18/24  0644 10/17/24  1522 10/17/24  0459   LACTIC ACID mmol/L  --   --   --  0.8  --    PROCALCITONIN ng/ml 0.26* 0.29* 0.29*  --  0.20       Recent Cultures (last 7 days):   Results from last 7 days   Lab Units 10/17/24  1522 10/17/24  1027   BLOOD CULTURE  No Growth at 48 hrs.  --    GRAM STAIN RESULT   --  Gram positive rods*       Imaging Results Review: I reviewed radiology reports from this admission including: CT chest and CT abdomen/pelvis.  Other Study Results Review: No additional pertinent studies reviewed.    Last 24 Hours Medication List:     Current Facility-Administered Medications:     acetaminophen (TYLENOL) tablet 650 mg, Q6H PRN    aspirin (ECOTRIN LOW STRENGTH) EC tablet 81 mg, Daily    atorvastatin (LIPITOR) tablet 40 mg, QPM    buPROPion (WELLBUTRIN) tablet 100 mg, BID    ceFEPime (MAXIPIME) 2,000 mg in dextrose 5 % 50 mL IVPB, Q24H, Last Rate: 2,000 mg (10/20/24 5916)    chlorhexidine (PERIDEX) 0.12 % oral rinse 15 mL, Q12H MIGEL    clopidogrel (PLAVIX) tablet 75 mg, Daily    heparin (porcine) subcutaneous injection 7,500 Units, Q8H MIGEL    hydrALAZINE (APRESOLINE) injection 10 mg, Q6H PRN    insulin lispro  (HumALOG/ADMELOG) 100 units/mL subcutaneous injection 2-12 Units, TID AC **AND** Fingerstick Glucose (POCT), TID AC    labetalol (NORMODYNE) tablet 200 mg, BID    NIFEdipine (PROCARDIA XL) 24 hr tablet 90 mg, Daily    OLANZapine (ZyPREXA) tablet 10 mg, HS    pantoprazole (PROTONIX) EC tablet 40 mg, Early Morning    polyethylene glycol (MIRALAX) packet 17 g, Daily PRN    torsemide (DEMADEX) tablet 20 mg, Daily    traZODone (DESYREL) tablet 50 mg, HS PRN    Valbenazine Tosylate CAPS 40 mg, HS    vancomycin (VANCOCIN) 1,250 mg in sodium chloride 0.9 % 250 mL IVPB, Once PRN    vancomycin (VANCOCIN) 1,250 mg in sodium chloride 0.9 % 250 mL IVPB, Once, Last Rate: 1,250 mg (10/20/24 0922)    Administrative Statements   Today, Patient Was Seen By: Peyton Saunders PA-C  I have spent a total time of 30 minutes in caring for this patient on the day of the visit/encounter including Diagnostic results, Risks and benefits of tx options, Instructions for management, Patient and family education, Importance of tx compliance, Counseling / Coordination of care, Documenting in the medical record, Reviewing / ordering tests, medicine, procedures  , Obtaining or reviewing history  , and Communicating with other healthcare professionals .    **Please Note: This note may have been constructed using a voice recognition system.**

## 2024-10-20 NOTE — ASSESSMENT & PLAN NOTE
Noted to be more lethargic on exam 10/17   VBG without retention, ammonia WNL   New onset fevers Tmax 100.3. Infectious work up ordered.  Possibly in the setting of underlying infection  Neuro checks  Improving

## 2024-10-20 NOTE — ASSESSMENT & PLAN NOTE
Lab Results   Component Value Date    HGBA1C 8.6 (H) 10/16/2024       Recent Labs     10/19/24  1110 10/19/24  1535 10/19/24  2125 10/20/24  0745   POCGLU 181* 154* 157* 114       Blood Sugar Average: Last 72 hrs:  (P) 152.25    Currently maintained on semaglutide monotherapy - hold on admission   SSI with accu checks  Hypoglycemia protocol  Diabetic diet

## 2024-10-20 NOTE — ASSESSMENT & PLAN NOTE
Lab Results   Component Value Date    EGFR 13 10/20/2024    EGFR 13 10/19/2024    EGFR 14 10/18/2024    CREATININE 4.43 (H) 10/20/2024    CREATININE 4.38 (H) 10/19/2024    CREATININE 4.19 (H) 10/18/2024

## 2024-10-20 NOTE — ASSESSMENT & PLAN NOTE
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

## 2024-10-20 NOTE — ASSESSMENT & PLAN NOTE
Lab Results   Component Value Date    EGFR 13 10/19/2024    EGFR 14 10/18/2024    EGFR 18 10/17/2024    CREATININE 4.38 (H) 10/19/2024    CREATININE 4.19 (H) 10/18/2024    CREATININE 3.52 (H) 10/17/2024   Calcium 8.6mg/dL  Phosphorus 3.8mg/dL, goal Less 5.5  No indication of binders

## 2024-10-20 NOTE — ASSESSMENT & PLAN NOTE
Volume: Euvolemic on exam  Blood pressure: Normotensive, /79  Recommend:  Continue with low-sodium diet    Continue with blood pressure regimen   Labetalol 200 mg twice daily   Nifedipine 90 mg   Torsemide 20 mg

## 2024-10-21 ENCOUNTER — TELEPHONE (OUTPATIENT)
Dept: PSYCHIATRY | Facility: CLINIC | Age: 59
End: 2024-10-21

## 2024-10-21 DIAGNOSIS — K21.00 GASTROESOPHAGEAL REFLUX DISEASE WITH ESOPHAGITIS WITHOUT HEMORRHAGE: Primary | ICD-10-CM

## 2024-10-21 PROBLEM — N18.4 ACUTE KIDNEY INJURY SUPERIMPOSED ON STAGE 4 CHRONIC KIDNEY DISEASE (HCC): Status: ACTIVE | Noted: 2024-10-17

## 2024-10-21 PROBLEM — G93.40 ENCEPHALOPATHY: Status: RESOLVED | Noted: 2024-10-17 | Resolved: 2024-10-21

## 2024-10-21 LAB
ANION GAP SERPL CALCULATED.3IONS-SCNC: 8 MMOL/L (ref 4–13)
BUN SERPL-MCNC: 39 MG/DL (ref 5–25)
CALCIUM SERPL-MCNC: 8.5 MG/DL (ref 8.4–10.2)
CHLORIDE SERPL-SCNC: 110 MMOL/L (ref 96–108)
CO2 SERPL-SCNC: 22 MMOL/L (ref 21–32)
CREAT SERPL-MCNC: 4.57 MG/DL (ref 0.6–1.3)
ERYTHROCYTE [DISTWIDTH] IN BLOOD BY AUTOMATED COUNT: 14.3 % (ref 11.6–15.1)
GFR SERPL CREATININE-BSD FRML MDRD: 13 ML/MIN/1.73SQ M
GLUCOSE SERPL-MCNC: 103 MG/DL (ref 65–140)
GLUCOSE SERPL-MCNC: 134 MG/DL (ref 65–140)
GLUCOSE SERPL-MCNC: 135 MG/DL (ref 65–140)
GLUCOSE SERPL-MCNC: 136 MG/DL (ref 65–140)
GLUCOSE SERPL-MCNC: 91 MG/DL (ref 65–140)
HCT VFR BLD AUTO: 31.9 % (ref 36.5–49.3)
HGB BLD-MCNC: 10.4 G/DL (ref 12–17)
MCH RBC QN AUTO: 26.1 PG (ref 26.8–34.3)
MCHC RBC AUTO-ENTMCNC: 32.6 G/DL (ref 31.4–37.4)
MCV RBC AUTO: 80 FL (ref 82–98)
PLA2R IGG SER IA-ACNC: <1.8 RU/ML (ref 0–19.9)
PLATELET # BLD AUTO: 195 THOUSANDS/UL (ref 149–390)
PMV BLD AUTO: 9.5 FL (ref 8.9–12.7)
POTASSIUM SERPL-SCNC: 4.2 MMOL/L (ref 3.5–5.3)
RBC # BLD AUTO: 3.99 MILLION/UL (ref 3.88–5.62)
SODIUM SERPL-SCNC: 140 MMOL/L (ref 135–147)
WBC # BLD AUTO: 7.65 THOUSAND/UL (ref 4.31–10.16)

## 2024-10-21 PROCEDURE — 99232 SBSQ HOSP IP/OBS MODERATE 35: CPT | Performed by: PHYSICIAN ASSISTANT

## 2024-10-21 PROCEDURE — 82948 REAGENT STRIP/BLOOD GLUCOSE: CPT

## 2024-10-21 PROCEDURE — 85027 COMPLETE CBC AUTOMATED: CPT | Performed by: INTERNAL MEDICINE

## 2024-10-21 PROCEDURE — 80048 BASIC METABOLIC PNL TOTAL CA: CPT | Performed by: INTERNAL MEDICINE

## 2024-10-21 PROCEDURE — 97535 SELF CARE MNGMENT TRAINING: CPT

## 2024-10-21 PROCEDURE — 99232 SBSQ HOSP IP/OBS MODERATE 35: CPT | Performed by: INTERNAL MEDICINE

## 2024-10-21 PROCEDURE — 97116 GAIT TRAINING THERAPY: CPT

## 2024-10-21 RX ORDER — PANTOPRAZOLE SODIUM 40 MG/1
40 TABLET, DELAYED RELEASE ORAL DAILY
Qty: 90 TABLET | Refills: 3 | Status: ON HOLD | OUTPATIENT
Start: 2024-10-21

## 2024-10-21 RX ADMIN — OLANZAPINE 10 MG: 10 TABLET, FILM COATED ORAL at 21:09

## 2024-10-21 RX ADMIN — ASPIRIN 81 MG: 81 TABLET, COATED ORAL at 08:48

## 2024-10-21 RX ADMIN — TORSEMIDE 20 MG: 20 TABLET ORAL at 08:48

## 2024-10-21 RX ADMIN — HEPARIN SODIUM 7500 UNITS: 5000 INJECTION INTRAVENOUS; SUBCUTANEOUS at 21:09

## 2024-10-21 RX ADMIN — BUPROPION HYDROCHLORIDE TABLETS 100 MG: 100 TABLET, FILM COATED ORAL at 17:55

## 2024-10-21 RX ADMIN — HEPARIN SODIUM 7500 UNITS: 5000 INJECTION INTRAVENOUS; SUBCUTANEOUS at 14:09

## 2024-10-21 RX ADMIN — CHLORHEXIDINE GLUCONATE 15 ML: 1.2 RINSE ORAL at 08:48

## 2024-10-21 RX ADMIN — LABETALOL HYDROCHLORIDE 200 MG: 100 TABLET, FILM COATED ORAL at 08:48

## 2024-10-21 RX ADMIN — CLOPIDOGREL BISULFATE 75 MG: 75 TABLET ORAL at 08:48

## 2024-10-21 RX ADMIN — HEPARIN SODIUM 7500 UNITS: 5000 INJECTION INTRAVENOUS; SUBCUTANEOUS at 04:17

## 2024-10-21 RX ADMIN — VALBENAZINE 40 MG: 40 CAPSULE ORAL at 21:10

## 2024-10-21 RX ADMIN — LABETALOL HYDROCHLORIDE 200 MG: 100 TABLET, FILM COATED ORAL at 17:55

## 2024-10-21 RX ADMIN — NIFEDIPINE 90 MG: 30 TABLET, FILM COATED, EXTENDED RELEASE ORAL at 08:48

## 2024-10-21 RX ADMIN — PANTOPRAZOLE SODIUM 40 MG: 40 TABLET, DELAYED RELEASE ORAL at 04:17

## 2024-10-21 RX ADMIN — BUPROPION HYDROCHLORIDE TABLETS 100 MG: 100 TABLET, FILM COATED ORAL at 08:48

## 2024-10-21 RX ADMIN — TRAZODONE HYDROCHLORIDE 50 MG: 50 TABLET ORAL at 21:09

## 2024-10-21 RX ADMIN — ATORVASTATIN CALCIUM 40 MG: 40 TABLET, FILM COATED ORAL at 17:55

## 2024-10-21 NOTE — ASSESSMENT & PLAN NOTE
UA negative for infection  Chest x-ray: No acute cardiopulmonary disease  Lactic acid within normal limits  CT chest/abdomen/pelvis: No evidence of infectious process  Currently being observed off antibiotics

## 2024-10-21 NOTE — ASSESSMENT & PLAN NOTE
Etiology: ATN from contrast associated nephropathy (10/14) versus TMA in setting of uncontrolled hypertension  Baseline creatinine around 2.9  Creatinine on admission 3.24 on 10/14  Creatinine today 4.57 (continues to rise but appears to be plateauing)  UA: Microhematuria (RBC 30-50), proteinuria  UPCR: 8.4 g prior to admission  Kidney ultrasound: No hydronephrosis  No hypocomplementemia  SPEP, PLA2R pending  Continue to monitor for renal recovery  No indication for renal replacement therapy  Trend BMP daily  Monitor postvoid residual, rule out urinary retention

## 2024-10-21 NOTE — OCCUPATIONAL THERAPY NOTE
"  Occupational Therapy Progress Note     Patient Name: Tommie Taylor  Today's Date: 10/21/2024  Problem List  Principal Problem:    CVA (cerebral vascular accident) ([facility])  Active Problems:    Bipolar I disorder, severe, current or most recent episode depressed, with psychotic features ([facility])    Tardive dyskinesia    Hypertensive emergency    Type 2 diabetes mellitus with hyperglycemia, without long-term current use of insulin ([facility])    Acute kidney injury superimposed on stage 4 chronic kidney disease ([facility])    SIRS (systemic inflammatory response syndrome) ([facility])            10/21/24 0840   OT Last Visit   OT Visit Date 10/21/24   Note Type   Note Type Treatment for insurance authorization   Pain Assessment   Pain Assessment Tool 0-10   Pain Score No Pain   Restrictions/Precautions   Weight Bearing Precautions Per Order No   Other Precautions Cognitive;Impulsive;Chair Alarm;Bed Alarm;Multiple lines;Fall Risk  (+R side hemiparesis)   Lifestyle   Autonomy Pt lives w/ family in a 2SH. Independent with ADLs, most IADLs and functional mobility w/o use of AD. (+) falls and (-) driving   Reciprocal Relationships Supportive family   Service to Others Retired working in [employer]   Intrinsic Gratification \"Busy working around the house\"   ADL   Grooming Assistance 4  Minimal Assistance   Grooming Deficit Increased time to complete;Supervision/safety;Verbal cueing;Wash/dry hands;Wash/dry face   Grooming Comments use of LUE - encouragement for involvement of RUE   UB Dressing Assistance 3  Moderate Assistance   UB Dressing Deficit Increased time to complete;Supervision/safety;Verbal cueing   UB Dressing Comments doning gown. Cuing for one handed techniques   LB Dressing Assistance 3  Moderate Assistance   LB Dressing Deficit Increased time to complete;Supervision/safety;Verbal cueing;Don/doff R sock;Don/doff L sock   LB Dressing Comments doffing L sock with stepping on toes of sock - able to don with increased time and one " "handed techniques. No LOB with forward bending. A with R sock   Bed Mobility   Supine to Sit 4  Minimal assistance   Additional items Assist x 1;Increased time required;Verbal cues;LE management;Bedrails   Additional Comments sitting EOB with (S), no lateral lean noted   Transfers   Sit to Stand 3  Moderate assistance   Additional items Assist x 1;Increased time required;Verbal cues   Stand to Sit 3  Moderate assistance   Additional items Assist x 1;Increased time required;Verbal cues   Stand pivot 3  Moderate assistance   Additional items Assist x 1;Increased time required;Verbal cues  (HHA for SPT to recliner chair towards L side)   Functional Mobility   Additional Comments Able to take x1 forward step during pivot transfer. Declining further functional mobility at this time- stating that he would like to try with PT later   Therapeutic Exercise - ROM   UE-ROM Yes   ROM- Right Upper Extremities   R Shoulder PROM;AAROM;Flexion;Extension;Horizontal ABduction;ABduction   R Elbow PROM;AAROM;Elbow flexion;Elbow extension   R Wrist PROM;AAROM;Wrist flexion;Wrist extension   RUE ROM Comment Edu on exercises - hand over hand to complete with good understanding - able to replicate therapist edu   Neuromuscular Education   Functional Movement Patterns provided with washcloth on table to reduce friction - able to complete table slides with increased time and encouragement for forward movements. Intermittent hand over hand to assist with motion   Subjective   Subjective \"I feel better today!\"   Cognition   Overall Cognitive Status Impaired   Arousal/Participation Alert;Cooperative   Attention Attends with cues to redirect   Orientation Level Oriented X4   Memory Decreased short term memory;Decreased recall of recent events   Following Commands Follows one step commands with increased time or repetition   Comments pleasant and cooperative, poor recall of previous session. Limited insights at times. Appropriately motivated and " "encouraged to participate   Activity Tolerance   Activity Tolerance Patient tolerated treatment well   Medical Staff Made Aware RN Bobby, CM Janell   Assessment   Assessment Pt seen on this date for skilled OT treatment session. At start of session pt supine in bed. Pt agreeable and motivated to participate in session stating \"I've been wanting to get up\". Pt demonstrating improved performance with bed mobility, sitting tolerance, alertness, activity tolerance, direction following. Demonstrating improved performance with UB and LB ADLs. Improved sit >< stand transfers and SPT. Demonstrating improved coordination of RUE. Edu provided on ther-ex for RUE with good understanding in order to improved RUE strength and coordination for participation in ADL and functional tasks. Would benefit from continued education for carryover and continued progress. Pt would continue to benefit from skilled OT treatment sessions in order to address remaining deficits   Plan   Goal Expiration Date 10/31/24   OT Treatment Day 2   OT Frequency 3-5x/wk   Discharge Recommendation   Rehab Resource Intensity Level, OT I (Maximum Resource Intensity)   Equipment Recommended Bedside commode   Commode Type Wide   AM-PAC Daily Activity Inpatient   Lower Body Dressing 2   Bathing 2   Toileting 2   Upper Body Dressing 2   Grooming 3   Eating 3   Daily Activity Raw Score 14   Daily Activity Standardized Score (Calc for Raw Score >=11) 33.39   AM-PAC Applied Cognition Inpatient   Following a Speech/Presentation 3   Understanding Ordinary Conversation 3   Taking Medications 3   Remembering Where Things Are Placed or Put Away 3   Remembering List of 4-5 Errands 2   Taking Care of Complicated Tasks 2   Applied Cognition Raw Score 16   Applied Cognition Standardized Score 35.03   End of Consult   Patient Position at End of Consult Bedside chair;Bed/Chair alarm activated;All needs within reach         GOALS:      -Patient will perform grooming tasks sitting " at sink vs EOB with overall ASHLEY in order to increase overall independence MET: upgrade to (S)     -Patient will be ASHLEY with UB dressing using AE and AD as needed in order to increase (I) with ADLs PROGRESSING     -Patient will be ASHLEY with UB bathing using AE and AD as needed in order to increase (I) with ADLs      -Patient will be MODA with LB dressing with use of AE and AD as needed in order to increase (I) with ADLs MET: upgrade to min A     -Patient will be MODA with LB bathing with use of AE and AD as needed in order to increase (I) with ADLs      -Patient will complete toileting w/ MODA w/ G hygiene/thoroughness in order to reduce caregiver burden      -Patient will demonstrate ASHLEY x 1 with bed mobility for ability to manage own comfort and initiate OOB tasks. MET: upgrade to (S)     -Patient will perform functional transfers with ASHLEY x 1 to/from all surfaces using DME as needed in order to increase (I) with functional tasks PROGRESSING     -Patient will be MODA x 1 with functional mobility to/from bathroom for increased independence with toileting tasks PROGRESSING     -Patient will tolerate therapeutic activities for greater than 30 min, in order to increase tolerance for functional activities. PROGRESSING     -Patient will engage in ongoing cognitive assessment in order to assist with safe discharge planning/recommendations.     -Patient will independently integrate one pacing strategy into morning ADL's.     -Patient will demonstrate standing for 3 min in order to increase active participation in functional activities      The patient's raw score on the AM-PAC Daily Activity Inpatient Short Form is 14. A raw score of less than 19 suggests the patient may benefit from discharge to post-acute rehabilitation services. HOWEVER please refer to the recommendation of the Occupational Therapist for safe discharge planning.      Iram Mccurdy MS, OTR/L

## 2024-10-21 NOTE — ASSESSMENT & PLAN NOTE
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  This is likely contaminant and does not require further antibiotic treatment  Unclear source of infection, consider possibility of aspiration given new dysphagia from stroke  CT C/A/P wo contrast given ROSINA. No evidence of acute infectious process  After patient was noted to be afebrile 24 hours and no clear source of infection,  IV cefepime and vancomycin were discontinued 10/20/24.  Continue to monitor.

## 2024-10-21 NOTE — ASSESSMENT & PLAN NOTE
Hypertensive emergency on admission in the setting of likely CVA  Evidenced by bp 197/104->231/105 POA  S/p Cardizem drip 10/15  PTA medications: Norvasc 10 mg and labetalol 100 mg twice daily.   Appreciate nephro assistance in BP management   Discontinue Norvasc  Increased nifedipine 90mg daily  Increased labetolol 200mg daily  Demadex 20 mg daily  Bps noted to be improving. Continue regimen as above

## 2024-10-21 NOTE — LETTER
10/21/24     Tommie Taylor   : 1965   [address] [place] [address]       It is the policy of [facility] to monitor and manage appointments that have been no-showed or cancelled with less than 48-hour notice. This is necessary to ensure that we are able to provide timely access for all patients to our providers. Undue numbers of unutilized appointments delays necessary medical care for all patients.      Dear Tommie Taylor       We are sorry that you missed your appointment with Anel Dowd PA-C on 10/18/2024. Your health and follow-up care are important to us. We want to make you aware that you do not have another appointment with Anel Dowd PA-C scheduled. If you have already rescheduled this appointment, please disregard.    Please be aware that our office policy states that if you 'no show' two or more Medication Management  appointments without prior notice of cancellation within in a calendar year, you may be discharged from Medication Management  treatment.  We want to bring this to your attention now to prevent an interruption of your care.  If you have any questions about this policy, please call us at the number above.     If we do not hear from you within 10 business days to make a follow up appointment, we will assume you are no longer interested in care here.    Thank you in advance for your cooperation and assistance.       Sincerely,      [facility] Support Staff

## 2024-10-21 NOTE — PLAN OF CARE
Problem: PAIN - ADULT  Goal: Verbalizes/displays adequate comfort level or baseline comfort level  Description: Interventions:  - Encourage patient to monitor pain and request assistance  - Assess pain using appropriate pain scale  - Administer analgesics based on type and severity of pain and evaluate response  - Implement non-pharmacological measures as appropriate and evaluate response  - Consider cultural and social influences on pain and pain management  - Notify physician/advanced practitioner if interventions unsuccessful or patient reports new pain  Outcome: Progressing     Problem: INFECTION - ADULT  Goal: Absence or prevention of progression during hospitalization  Description: INTERVENTIONS:  - Assess and monitor for signs and symptoms of infection  - Monitor lab/diagnostic results  - Monitor all insertion sites, i.e. indwelling lines, tubes, and drains  - Monitor endotracheal if appropriate and nasal secretions for changes in amount and color  - Hubbell appropriate cooling/warming therapies per order  - Administer medications as ordered  - Instruct and encourage patient and family to use good hand hygiene technique  - Identify and instruct in appropriate isolation precautions for identified infection/condition  Outcome: Progressing  Goal: Absence of fever/infection during neutropenic period  Description: INTERVENTIONS:  - Monitor WBC    Outcome: Progressing     Problem: SAFETY ADULT  Goal: Patient will remain free of falls  Description: INTERVENTIONS:  - Educate patient/family on patient safety including physical limitations  - Instruct patient to call for assistance with activity   - Consult OT/PT to assist with strengthening/mobility   - Keep Call bell within reach  - Keep bed low and locked with side rails adjusted as appropriate  - Keep care items and personal belongings within reach  - Initiate and maintain comfort rounds  - Make Fall Risk Sign visible to staff  - Offer Toileting every 2 Hours,  in advance of need  - Initiate/Maintain bed alarm  - Apply yellow socks and bracelet for high fall risk patients  - Consider moving patient to room near nurses station  Outcome: Progressing  Goal: Maintain or return to baseline ADL function  Description: INTERVENTIONS:  -  Assess patient's ability to carry out ADLs; assess patient's baseline for ADL function and identify physical deficits which impact ability to perform ADLs (bathing, care of mouth/teeth, toileting, grooming, dressing, etc.)  - Assess/evaluate cause of self-care deficits   - Assess range of motion  - Assess patient's mobility; develop plan if impaired  - Assess patient's need for assistive devices and provide as appropriate  - Encourage maximum independence but intervene and supervise when necessary  - Involve family in performance of ADLs  - Assess for home care needs following discharge   - Consider OT consult to assist with ADL evaluation and planning for discharge  - Provide patient education as appropriate  Outcome: Progressing  Goal: Maintains/Returns to pre admission functional level  Description: INTERVENTIONS:  - Perform AM-PAC 6 Click Basic Mobility/ Daily Activity assessment daily.  - Set and communicate daily mobility goal to care team and patient/family/caregiver.   - Collaborate with rehabilitation services on mobility goals if consulted  - Perform Range of Motion 3 times a day.  - Reposition patient every 2 hours.  - Dangle patient 3 times a day  - Stand patient 3 times a day  - Ambulate patient 3 times a day  - Out of bed to chair 3 times a day   - Out of bed for meals 3 times a day  - Out of bed for toileting  - Record patient progress and toleration of activity level   Outcome: Progressing     Problem: DISCHARGE PLANNING  Goal: Discharge to home or other facility with appropriate resources  Description: INTERVENTIONS:  - Identify barriers to discharge w/patient and caregiver  - Arrange for needed discharge resources and  transportation as appropriate  - Identify discharge learning needs (meds, wound care, etc.)  - Arrange for interpretive services to assist at discharge as needed  - Refer to Case Management Department for coordinating discharge planning if the patient needs post-hospital services based on physician/advanced practitioner order or complex needs related to functional status, cognitive ability, or social support system  Outcome: Progressing     Problem: Knowledge Deficit  Goal: Patient/family/caregiver demonstrates understanding of disease process, treatment plan, medications, and discharge instructions  Description: Complete learning assessment and assess knowledge base.  Interventions:  - Provide teaching at level of understanding  - Provide teaching via preferred learning methods  Outcome: Progressing     Problem: Neurological Deficit  Goal: Neurological status is stable or improving  Description: Interventions:  - Monitor and assess patient's level of consciousness, motor function, sensory function, and level of assistance needed for ADLs.   - Monitor and report changes from baseline. Collaborate with interdisciplinary team to initiate plan and implement interventions as ordered.   - Provide and maintain a safe environment.  - Consider seizure precautions.  - Consider fall precautions.  - Consider aspiration precautions.  - Consider bleeding precautions.  Outcome: Progressing     Problem: Activity Intolerance/Impaired Mobility  Goal: Mobility/activity is maintained at optimum level for patient  Description: Interventions:  - Assess and monitor patient  barriers to mobility and need for assistive/adaptive devices.  - Assess patient's emotional response to limitations.  - Collaborate with interdisciplinary team and initiate plans and interventions as ordered.  - Encourage independent activity per ability.  - Maintain proper body alignment.  - Perform active/passive rom as tolerated/ordered.  - Plan activities to  conserve energy.  - Turn patient as appropriate  Outcome: Progressing     Problem: Communication Impairment  Goal: Ability to express needs and understand communication  Description: Assess patient's communication skills and ability to understand information.  Patient will demonstrate use of effective communication techniques, alternative methods of communication and understanding even if not able to speak.     - Encourage communication and provide alternate methods of communication as needed.  - Collaborate with case management/ for discharge needs.  - Include patient/family/caregiver in decisions related to communication.  Outcome: Progressing     Problem: Potential for Aspiration  Goal: Non-ventilated patient's risk of aspiration is minimized  Description: Assess and monitor vital signs, respiratory status, and labs (WBC).  Monitor for signs of aspiration (tachypnea, cough, rales, wheezing, cyanosis, fever).    - Assess and monitor patient's ability to swallow.  - Place patient up in chair to eat if possible.  - HOB up at 90 degrees to eat if unable to get patient up into chair.  - Supervise patient during oral intake.   - Instruct patient/ family to take small bites.  - Instruct patient/ family to take small single sips when taking liquids.  - Follow patient-specific strategies generated by speech pathologist.  Outcome: Progressing  Goal: Ventilated patient's risk of aspiration is minimized  Description: Assess and monitor vital signs, respiratory status, airway cuff pressure, and labs (WBC).  Monitor for signs of aspiration (tachypnea, cough, rales, wheezing, cyanosis, fever).    - Elevate head of bed 30 degrees if patient has tube feeding.  - Monitor tube feeding.  Outcome: Progressing     Problem: Nutrition  Goal: Nutrition/Hydration status is improving  Description: Monitor and assess patient's nutrition/hydration status for malnutrition (ex- brittle hair, bruises, dry skin, pale skin and  conjunctiva, muscle wasting, smooth red tongue, and disorientation). Collaborate with interdisciplinary team and initiate plan and interventions as ordered.  Monitor patient's weight and dietary intake as ordered or per policy. Utilize nutrition screening tool and intervene per policy. Determine patient's food preferences and provide high-protein, high-caloric foods as appropriate.     - Assist patient with eating.  - Allow adequate time for meals.  - Encourage patient to take dietary supplement as ordered.  - Collaborate with clinical nutritionist.  - Include patient/family/caregiver in decisions related to nutrition.  Outcome: Progressing     Problem: Prexisting or High Potential for Compromised Skin Integrity  Goal: Skin integrity is maintained or improved  Description: INTERVENTIONS:  - Identify patients at risk for skin breakdown  - Assess and monitor skin integrity  - Assess and monitor nutrition and hydration status  - Monitor labs   - Assess for incontinence   - Turn and reposition patient  - Assist with mobility/ambulation  - Relieve pressure over bony prominences  - Avoid friction and shearing  - Provide appropriate hygiene as needed including keeping skin clean and dry  - Evaluate need for skin moisturizer/barrier cream  - Collaborate with interdisciplinary team   - Patient/family teaching  - Consider wound care consult   Outcome: Progressing

## 2024-10-21 NOTE — ASSESSMENT & PLAN NOTE
Lab Results   Component Value Date    HGBA1C 8.6 (H) 10/16/2024       Recent Labs     10/20/24  1149 10/20/24  1547 10/20/24  2119 10/21/24  0719   POCGLU 135 137 129 103     Blood Sugar Average: Last 72 hrs:  (P) [id number]  A1c reflects poor control but blood sugars have been stable here on sliding scale alone  Currently maintained on semaglutide monotherapy outpatient- hold on admission   SSI with accu checks  Hypoglycemia protocol  Diabetic diet

## 2024-10-21 NOTE — LETTER
10/21/24     Tommie Taylor   : 1965   [address] [place] [address]       It is the policy of [facility] to monitor and manage appointments that have been no-showed or cancelled with less than 48-hour notice. This is necessary to ensure that we are able to provide timely access for all patients to our providers. Undue numbers of unutilized appointments delays necessary medical care for all patients.      Dear Tommie Taylor       We are sorry that you missed your appointment with Anel Dowd PA-C on 10/18/2024. It has been 2 months or more since your last appointment. Your health and follow-up care are important to us. We want to make you aware that you do not have another appointment with Anel Dowd PA-C scheduled. If you have already rescheduled this appointment, please disregard.    Please be aware that our office policy states that if you 'no show' two or more Medication Management  appointments without prior notice of cancellation within in a calendar year, you may be discharged from Medication Management  treatment.  We want to bring this to your attention now to prevent an interruption of your care.  If you have any questions about this policy, please call us at the number above.     If we do not hear from you within 10 business days to make a follow up appointment, we will assume you are no longer interested in care here.    Thank you in advance for your cooperation and assistance.       Sincerely,      [facility] Support Staff

## 2024-10-21 NOTE — ASSESSMENT & PLAN NOTE
Examination showed bilateral lower extremity pitting edema  Echo on 10/15 showed normal cavity size, moderately increased wall thickness, moderate concentric hypertrophy, EF of 70%, G2 DD  Current Rx: Torsemide 20 mg daily  Changes: Continue torsemide 20 mg daily

## 2024-10-21 NOTE — ASSESSMENT & PLAN NOTE
Etiology suggested to be secondary to ATN versus TMA in the setting of uncontrolled blood pressure and complicated by contrast associated nephropathy   Baseline creatinine around 2.9  Current creatinine 4.57 , still rising but slowly therefore suggesting it is starting to plateau   UA with microhematuria, proteinuria  US kidney and bladder --bladder wall thickening but no hydronephrosis  Requested bladder scan to ensure no retention  No indication of dialysis at this time per nephrology  Avoid nephrotoxic agents  Monitor I&Os  Daily BMP  Nephrology following, input is appreciated.

## 2024-10-21 NOTE — PLAN OF CARE
Problem: PHYSICAL THERAPY ADULT  Goal: Performs mobility at highest level of function for planned discharge setting.  See evaluation for individualized goals.  Description: Treatment/Interventions: Functional transfer training, LE strengthening/ROM, Elevations, Therapeutic exercise, Endurance training, Patient/family training, Equipment eval/education, Bed mobility, Gait training, Compensatory technique education, Spoke to nursing, Spoke to case management    Equipment Recommended: Other (Comment) (TBD pending progress)     See flowsheet documentation for full assessment, interventions and recommendations.  Outcome: Progressing  Note: Prognosis: Good  Problem List: Decreased strength, Decreased range of motion, Decreased endurance, Impaired balance, Decreased mobility, Decreased coordination, Obesity, Decreased safety awareness  Assessment: pt shows improvement from previous session w/ increased activity tolerance. pt was able to initiate ambulation. assist continues to be required for all phases of mobility. input was needed for technique and task focus/safety. pt had inconsistent carryover of input received during the session. pt remains at high risk for falls due to physical and safety awareness deficits. continued inpatient PT is needed to reduce fall risk factors and maximize levels of mobility and independence.  Barriers to Discharge: Inaccessible home environment     Rehab Resource Intensity Level, PT: I (Maximum Resource Intensity)    See flowsheet documentation for full assessment.

## 2024-10-21 NOTE — ASSESSMENT & PLAN NOTE
Etiology: Multifactorial likely secondary to diabetic glomerulopathy with proteinuria and nephrosclerosis in setting of uncontrolled hypertension  Outpatient nephrologist: Dr. No

## 2024-10-21 NOTE — PROGRESS NOTES
NEPHROLOGY HOSPITAL PROGRESS NOTE   Tommie Taylor 59 y.o. male MRN: [id number]  Unit/Bed#: S -01 Encounter: [id number]  Reason for Consult: ROSINA on CKD    Assessment & Plan  ROSINA (acute kidney injury) (Formerly [facility])  Etiology: ATN from contrast associated nephropathy (10/14) versus TMA in setting of uncontrolled hypertension  Baseline creatinine around 2.9  Creatinine on admission 3.24 on 10/14  Creatinine today 4.57 (continues to rise but appears to be plateauing)  UA: Microhematuria (RBC 30-50), proteinuria  UPCR: 8.4 g prior to admission  Kidney ultrasound: No hydronephrosis  No hypocomplementemia  SPEP, PLA2R pending  Continue to monitor for renal recovery  No indication for renal replacement therapy  Trend BMP daily  Monitor postvoid residual, rule out urinary retention  Hypertensive emergency  Current Rx: Labetalol 200 mg twice daily, nifedipine 90 mg daily, torsemide 20 mg daily  Changes: No changes today as blood pressure is better controlled    CVA (cerebral vascular accident) (Formerly [facility])  Management per primary team and neurology  Stage 4 chronic kidney disease (Formerly [facility])  Etiology: Multifactorial likely secondary to diabetic glomerulopathy with proteinuria and nephrosclerosis in setting of uncontrolled hypertension  Outpatient nephrologist: Dr. No  Bipolar I disorder, severe, current or most recent episode depressed, with psychotic features (Formerly [facility])  Management per primary team  Volume overload  Examination showed bilateral lower extremity pitting edema  Echo on 10/15 showed normal cavity size, moderately increased wall thickness, moderate concentric hypertrophy, EF of 70%, G2 DD  Current Rx: Torsemide 20 mg daily  Changes: Continue torsemide 20 mg daily  SIRS (systemic inflammatory response syndrome) (Formerly [facility])  UA negative for infection  Chest x-ray: No acute cardiopulmonary disease  Lactic acid within normal limits  CT chest/abdomen/pelvis: No evidence of infectious process  Currently being observed off  antibiotics    Discussed with internal medicine team.  After discussion, we agreed that rate of rise in creatinine has decreased and to continue current medications and that there is no indication for renal replacement therapy.    SUBJECTIVE / 24H INTERVAL HISTORY:  Urine output recorded as 2300 cc.  Denies dyspnea.  Lying flat without dyspnea.    OBJECTIVE:  Current Weight: Weight - Scale: 121 kg (267 lb 13.7 oz)  Vitals:    10/20/24 0746 10/20/24 1500 10/20/24 2116 10/21/24 0535   BP: 146/80 138/81 113/68    BP Location:  Left arm     Pulse: 84 63 73    Resp:  20 19    Temp: 98.3 °F (36.8 °C) 97.9 °F (36.6 °C) 98.4 °F (36.9 °C)    TempSrc:  Oral     SpO2: 98% 97% 98%    Weight:    121 kg (267 lb 13.7 oz)   Height:           Intake/Output Summary (Last 24 hours) at 10/21/2024 0608  Last data filed at 10/21/2024 0415  Gross per 24 hour   Intake 600 ml   Output 2300 ml   Net -1700 ml     Review of Systems   Constitutional:  Negative for chills and fever.   HENT:  Negative for ear pain and sore throat.    Eyes:  Negative for pain and visual disturbance.   Respiratory:  Negative for cough and shortness of breath.    Cardiovascular:  Negative for chest pain and palpitations.   Gastrointestinal:  Negative for abdominal pain and vomiting.   Genitourinary:  Negative for dysuria and hematuria.   Musculoskeletal:  Negative for arthralgias and back pain.   Skin:  Negative for color change and rash.   Neurological:  Negative for seizures and syncope.   All other systems reviewed and are negative.    Physical Exam  Vitals and nursing note reviewed.   Constitutional:       General: He is not in acute distress.     Appearance: He is well-developed.   HENT:      Head: Normocephalic and atraumatic.   Eyes:      Conjunctiva/sclera: Conjunctivae normal.   Cardiovascular:      Rate and Rhythm: Normal rate and regular rhythm.      Heart sounds: No murmur heard.  Pulmonary:      Effort: Pulmonary effort is normal. No respiratory  distress.      Breath sounds: Normal breath sounds.   Abdominal:      Palpations: Abdomen is soft.      Tenderness: There is no abdominal tenderness.   Musculoskeletal:         General: No swelling.      Cervical back: Neck supple.   Skin:     General: Skin is warm and dry.      Capillary Refill: Capillary refill takes less than 2 seconds.   Neurological:      Mental Status: He is alert.   Psychiatric:         Mood and Affect: Mood normal.           Medications:    Current Facility-Administered Medications:     acetaminophen (TYLENOL) tablet 650 mg, 650 mg, Oral, Q6H PRN, Macey Almeida PA-C, 650 mg at 10/17/24 1422    aspirin (ECOTRIN LOW STRENGTH) EC tablet 81 mg, 81 mg, Oral, Daily, Rose Miner MD, 81 mg at 10/20/24 0844    atorvastatin (LIPITOR) tablet 40 mg, 40 mg, Oral, QPM, Rose Miner MD, 40 mg at 10/20/24 1722    buPROPion (WELLBUTRIN) tablet 100 mg, 100 mg, Oral, BID, Rose Miner MD, 100 mg at 10/20/24 1722    chlorhexidine (PERIDEX) 0.12 % oral rinse 15 mL, 15 mL, Mouth/Throat, Q12H MIGEL, Rose Miner MD, 15 mL at 10/20/24 0844    clopidogrel (PLAVIX) tablet 75 mg, 75 mg, Oral, Daily, Rose Miner MD, 75 mg at 10/20/24 0844    heparin (porcine) subcutaneous injection 7,500 Units, 7,500 Units, Subcutaneous, Q8H MIGEL, Rose Miner MD, 7,500 Units at 10/21/24 0417    hydrALAZINE (APRESOLINE) injection 10 mg, 10 mg, Intravenous, Q6H PRN, Rose Miner MD, 10 mg at 10/17/24 1430    insulin lispro (HumALOG/ADMELOG) 100 units/mL subcutaneous injection 2-12 Units, 2-12 Units, Subcutaneous, TID AC, 2 Units at 10/19/24 1808 **AND** Fingerstick Glucose (POCT), , , TID AC, Rose Miner MD    labetalol (NORMODYNE) tablet 200 mg, 200 mg, Oral, BID, Joselyn Reyes Bahamonde, MD, 200 mg at 10/20/24 1722    NIFEdipine (PROCARDIA XL) 24 hr tablet 90 mg, 90 mg, Oral, Daily, Joselyn Reyes Bahamonde, MD, 90 mg at 10/20/24 0844    OLANZapine (ZyPREXA) tablet 10 mg, 10 mg, Oral, HS, Rose Miner MD, 10 mg at  "10/20/24 2208    pantoprazole (PROTONIX) EC tablet 40 mg, 40 mg, Oral, Early Morning, Rose Miner MD, 40 mg at 10/21/24 0417    polyethylene glycol (MIRALAX) packet 17 g, 17 g, Oral, Daily PRN, Rose Miner MD    torsemide (DEMADEX) tablet 20 mg, 20 mg, Oral, Daily, Joselyn Reyes Bahamonde, MD, 20 mg at 10/20/24 0844    traZODone (DESYREL) tablet 50 mg, 50 mg, Oral, HS PRN, Rose Miner MD    Valbenazine Tosylate CAPS 40 mg, 40 mg, Oral, HS, Rose Miner MD, 40 mg at 10/20/24 2213    Laboratory Results:  Results from last 7 days   Lab Units 10/21/24  0431 10/20/24  0809 10/20/24  0440 10/19/24  0447 10/18/24  0644 10/17/24  0557 10/17/24  0459 10/16/24  0504 10/15/24  0553   WBC Thousand/uL 7.65  --  6.86 7.54 8.28 8.35  --  7.27 8.06   HEMOGLOBIN g/dL 10.4*  --  9.3* 10.1* 10.3* 10.9*  --  10.8* 10.9*   HEMATOCRIT % 31.9*  --  28.7* 30.8* 31.5* 32.7*  --  32.8* 32.1*   PLATELETS Thousands/uL 195  --  178 206 212 194  --  177 168   POTASSIUM mmol/L 4.2 4.4  --  4.7 4.6  --  5.1 4.1 4.1   CHLORIDE mmol/L 110* 112*  --  113* 112*  --  112* 110* 106   CO2 mmol/L 22 23  --  23 22  --  18* 22 22   BUN mg/dL 39* 36*  --  36* 33*  --  26* 27* 32*   CREATININE mg/dL 4.57* 4.43*  --  4.38* 4.19*  --  3.52* 3.27* 3.08*   CALCIUM mg/dL 8.5 8.6  --  8.5 8.8  --  8.7 8.5 8.2*   MAGNESIUM mg/dL  --   --   --   --   --   --  2.1 2.0 1.6*   PHOSPHORUS mg/dL  --   --   --   --   --   --  3.8 4.0 4.2       Portions of the record may have been created with voice recognition software. Occasional wrong word or \"sound a like\" substitutions may have occurred due to the inherent limitations of voice recognition software. Read the chart carefully and recognize, using context, where substitutions have occurred.If you have any questions, please contact the dictating provider.    "

## 2024-10-21 NOTE — PLAN OF CARE
Problem: PAIN - ADULT  Goal: Verbalizes/displays adequate comfort level or baseline comfort level  Description: Interventions:  - Encourage patient to monitor pain and request assistance  - Assess pain using appropriate pain scale  - Administer analgesics based on type and severity of pain and evaluate response  - Implement non-pharmacological measures as appropriate and evaluate response  - Consider cultural and social influences on pain and pain management  - Notify physician/advanced practitioner if interventions unsuccessful or patient reports new pain  Outcome: Progressing     Problem: INFECTION - ADULT  Goal: Absence or prevention of progression during hospitalization  Description: INTERVENTIONS:  - Assess and monitor for signs and symptoms of infection  - Monitor lab/diagnostic results  - Monitor all insertion sites, i.e. indwelling lines, tubes, and drains  - Monitor endotracheal if appropriate and nasal secretions for changes in amount and color  - [place] appropriate cooling/warming therapies per order  - Administer medications as ordered  - Instruct and encourage patient and family to use good hand hygiene technique  - Identify and instruct in appropriate isolation precautions for identified infection/condition  Outcome: Progressing  Goal: Absence of fever/infection during neutropenic period  Description: INTERVENTIONS:  - Monitor WBC    Outcome: Progressing     Problem: SAFETY ADULT  Goal: Patient will remain free of falls  Description: INTERVENTIONS:  - Educate patient/family on patient safety including physical limitations  - Instruct patient to call for assistance with activity   - Consult OT/PT to assist with strengthening/mobility   - Keep Call bell within reach  - Keep bed low and locked with side rails adjusted as appropriate  - Keep care items and personal belongings within reach  - Initiate and maintain comfort rounds  - Make Fall Risk Sign visible to staff  - Offer Toileting every 2 Hours,  in advance of need  - Initiate/Maintain bed alarm  - Obtain necessary fall risk management equipment: alarm  - Apply yellow socks and bracelet for high fall risk patients  - Consider moving patient to room near nurses station  Outcome: Progressing  Goal: Maintain or return to baseline ADL function  Description: INTERVENTIONS:  -  Assess patient's ability to carry out ADLs; assess patient's baseline for ADL function and identify physical deficits which impact ability to perform ADLs (bathing, care of mouth/teeth, toileting, grooming, dressing, etc.)  - Assess/evaluate cause of self-care deficits   - Assess range of motion  - Assess patient's mobility; develop plan if impaired  - Assess patient's need for assistive devices and provide as appropriate  - Encourage maximum independence but intervene and supervise when necessary  - Involve family in performance of ADLs  - Assess for home care needs following discharge   - Consider OT consult to assist with ADL evaluation and planning for discharge  - Provide patient education as appropriate  Outcome: Progressing  Goal: Maintains/Returns to pre admission functional level  Description: INTERVENTIONS:  - Perform AM-PAC 6 Click Basic Mobility/ Daily Activity assessment daily.  - Set and communicate daily mobility goal to care team and patient/family/caregiver.   - Collaborate with rehabilitation services on mobility goals if consulted  - Perform Range of Motion 2 times a day.  - Reposition patient every 2 hours.  - Dangle patient 2 times a day  - Stand patient 2 times a day  - Ambulate patient 2 times a day  - Out of bed to chair 2 times a day   - Out of bed for meals 2 times a day  - Out of bed for toileting  - Record patient progress and toleration of activity level   Outcome: Progressing     Problem: DISCHARGE PLANNING  Goal: Discharge to home or other facility with appropriate resources  Description: INTERVENTIONS:  - Identify barriers to discharge w/patient and  caregiver  - Arrange for needed discharge resources and transportation as appropriate  - Identify discharge learning needs (meds, wound care, etc.)  - Arrange for interpretive services to assist at discharge as needed  - Refer to Case Management Department for coordinating discharge planning if the patient needs post-hospital services based on physician/advanced practitioner order or complex needs related to functional status, cognitive ability, or social support system  Outcome: Progressing     Problem: Knowledge Deficit  Goal: Patient/family/caregiver demonstrates understanding of disease process, treatment plan, medications, and discharge instructions  Description: Complete learning assessment and assess knowledge base.  Interventions:  - Provide teaching at level of understanding  - Provide teaching via preferred learning methods  Outcome: Progressing     Problem: Neurological Deficit  Goal: Neurological status is stable or improving  Description: Interventions:  - Monitor and assess patient's level of consciousness, motor function, sensory function, and level of assistance needed for ADLs.   - Monitor and report changes from baseline. Collaborate with interdisciplinary team to initiate plan and implement interventions as ordered.   - Provide and maintain a safe environment.  - Consider seizure precautions.  - Consider fall precautions.  - Consider aspiration precautions.  - Consider bleeding precautions.  Outcome: Progressing     Problem: Activity Intolerance/Impaired Mobility  Goal: Mobility/activity is maintained at optimum level for patient  Description: Interventions:  - Assess and monitor patient  barriers to mobility and need for assistive/adaptive devices.  - Assess patient's emotional response to limitations.  - Collaborate with interdisciplinary team and initiate plans and interventions as ordered.  - Encourage independent activity per ability.  - Maintain proper body alignment.  - Perform  active/passive rom as tolerated/ordered.  - Plan activities to conserve energy.  - Turn patient as appropriate  Outcome: Progressing     Problem: Communication Impairment  Goal: Ability to express needs and understand communication  Description: Assess patient's communication skills and ability to understand information.  Patient will demonstrate use of effective communication techniques, alternative methods of communication and understanding even if not able to speak.     - Encourage communication and provide alternate methods of communication as needed.  - Collaborate with case management/ for discharge needs.  - Include patient/family/caregiver in decisions related to communication.  Outcome: Progressing     Problem: Potential for Aspiration  Goal: Non-ventilated patient's risk of aspiration is minimized  Description: Assess and monitor vital signs, respiratory status, and labs (WBC).  Monitor for signs of aspiration (tachypnea, cough, rales, wheezing, cyanosis, fever).    - Assess and monitor patient's ability to swallow.  - Place patient up in chair to eat if possible.  - HOB up at 90 degrees to eat if unable to get patient up into chair.  - Supervise patient during oral intake.   - Instruct patient/ family to take small bites.  - Instruct patient/ family to take small single sips when taking liquids.  - Follow patient-specific strategies generated by speech pathologist.  Outcome: Progressing  Goal: Ventilated patient's risk of aspiration is minimized  Description: Assess and monitor vital signs, respiratory status, airway cuff pressure, and labs (WBC).  Monitor for signs of aspiration (tachypnea, cough, rales, wheezing, cyanosis, fever).    - Elevate head of bed 30 degrees if patient has tube feeding.  - Monitor tube feeding.  Outcome: Progressing     Problem: Nutrition  Goal: Nutrition/Hydration status is improving  Description: Monitor and assess patient's nutrition/hydration status for  malnutrition (ex- brittle hair, bruises, dry skin, pale skin and conjunctiva, muscle wasting, smooth red tongue, and disorientation). Collaborate with interdisciplinary team and initiate plan and interventions as ordered.  Monitor patient's weight and dietary intake as ordered or per policy. Utilize nutrition screening tool and intervene per policy. Determine patient's food preferences and provide high-protein, high-caloric foods as appropriate.     - Assist patient with eating.  - Allow adequate time for meals.  - Encourage patient to take dietary supplement as ordered.  - Collaborate with clinical nutritionist.  - Include patient/family/caregiver in decisions related to nutrition.  Outcome: Progressing     Problem: Prexisting or High Potential for Compromised Skin Integrity  Goal: Skin integrity is maintained or improved  Description: INTERVENTIONS:  - Identify patients at risk for skin breakdown  - Assess and monitor skin integrity  - Assess and monitor nutrition and hydration status  - Monitor labs   - Assess for incontinence   - Turn and reposition patient  - Assist with mobility/ambulation  - Relieve pressure over bony prominences  - Avoid friction and shearing  - Provide appropriate hygiene as needed including keeping skin clean and dry  - Evaluate need for skin moisturizer/barrier cream  - Collaborate with interdisciplinary team   - Patient/family teaching  - Consider wound care consult   Outcome: Progressing

## 2024-10-21 NOTE — PROGRESS NOTES
Progress Note - Hospitalist   Name: Tommie Taylor 59 y.o. male I MRN: [id number]  Unit/Bed#: S -01 I Date of Admission: 10/14/2024   Date of Service: 10/21/2024 I Hospital Day: 6    Assessment & Plan  CVA (cerebral vascular accident) (HCC)  Patient presented with right upper and right lower extremity weakness with associated right facial droop, highly suggestive of CVA  CT/CTA head unremarkable for acute process  TnK administered following correction of hypertension with nicardipine drip in the ICU  Following thrombolytic administration patient developed worsening slurred speech and headache, had repeat CT head which was unremarkable  Repeat head CT at 24 hours post TNK negative for signs of bleed  Neurology consult  MRI significant for left corona radiata stroke.  Placed on DAPT therapy for 21 days and then continue aspirin monotherapy indefinitely per neurology.  Started on Lipitor 40 mg per stroke protocol  Recommend Zio patch outpatient to evaluate for any possible arrhythmia  Will need f/u with neurovascular team in 6-8 weeks   10/17 patient with worsening lethargy, alert and oriented x 2.  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  This is likely contaminant and does not require further antibiotic treatment  Unclear source of infection, consider possibility of aspiration given new dysphagia from stroke  CT C/A/P wo contrast given ROSINA. No evidence of acute infectious process  After patient was noted to be afebrile 24 hours and no clear source of infection,  IV cefepime and vancomycin were discontinued 10/20/24.  Continue to monitor.    Bipolar I  disorder, severe, current or most recent episode depressed, with psychotic features (HCC)  History of Bipolar disorder, follow with psych  Continue Zyprexa, buproprion  Tardive dyskinesia  Continue valbenazine  Hypertensive emergency  Hypertensive emergency on admission in the setting of likely CVA  Evidenced by bp 197/104->231/105 POA  S/p Cardizem drip 10/15  PTA medications: Norvasc 10 mg and labetalol 100 mg twice daily.   Appreciate nephro assistance in BP management   Discontinue Norvasc  Increased nifedipine 90mg daily  Increased labetolol 200mg daily  Demadex 20 mg daily  Bps noted to be improving. Continue regimen as above  Type 2 diabetes mellitus with hyperglycemia, without long-term current use of insulin ([facility])  Lab Results   Component Value Date    HGBA1C 8.6 (H) 10/16/2024       Recent Labs     10/20/24  1149 10/20/24  1547 10/20/24  2119 10/21/24  0719   POCGLU 135 137 129 103     Blood Sugar Average: Last 72 hrs:  (P) [id number]  A1c reflects poor control but blood sugars have been stable here on sliding scale alone  Currently maintained on semaglutide monotherapy outpatient- hold on admission   SSI with accu checks  Hypoglycemia protocol  Diabetic diet  Acute kidney injury superimposed on stage 4 chronic kidney disease (HCC)  Etiology suggested to be secondary to ATN versus TMA in the setting of uncontrolled blood pressure and complicated by contrast associated nephropathy   Baseline creatinine around 2.9  Current creatinine 4.57 , still rising but slowly therefore suggesting it is starting to plateau   UA with microhematuria, proteinuria  US kidney and bladder --bladder wall thickening but no hydronephrosis  Requested bladder scan to ensure no retention  No indication of dialysis at this time per nephrology  Avoid nephrotoxic agents  Monitor I&Os  Daily BMP  Nephrology following, input is appreciated.   Encephalopathy (Resolved: 10/21/2024)  Noted to be more lethargic on exam 10/17   Head  CT negative  VBG without retention, ammonia WNL   New onset fevers Tmax 100.3. Infectious work up as noted  resolved    VTE Pharmacologic Prophylaxis:   Subcu heparin    Mobility:   Basic Mobility Inpatient Raw Score: 7  -HL Goal: 2: Bed activities/Dependent transfer  JH-HLM Achieved: 4: Move to chair/commode  JH-HLM Goal achieved. Continue to encourage appropriate mobility.    Patient Centered Rounds: I performed bedside rounds with nursing staff today.   Discussions with Specialists or Other Care Team Provider: renal, infectious disease    Education and Discussions with Family / Patient: Attempted to update  (wife) via phone. Left voicemail.     Current Length of Stay: 6 day(s)  Current Patient Status: Inpatient   Certification Statement: The patient will continue to require additional inpatient hospital stay due to ongoing rising creatinine  Discharge Plan: Anticipate discharge in 24-48 hrs to rehab facility.    Code Status: Level 1 - Full Code    Subjective   Patient tells me he feels tired.  Otherwise without any complaints of headache, shortness of breath or difficulty passing urine.  Reports no dental pain/infections.  No fever chills or other signs of infection.  No tremor    Objective :  Temp:  [97.9 °F (36.6 °C)-98.4 °F (36.9 °C)] 98.2 °F (36.8 °C)  HR:  [63-73] 64  BP: (113-158)/(68-88) 158/88  Resp:  [19-20] 19  SpO2:  [97 %-98 %] 98 %  O2 Device: None (Room air)    Body mass index is 43.23 kg/m².     Input and Output Summary (last 24 hours):     Intake/Output Summary (Last 24 hours) at 10/21/2024 0849  Last data filed at 10/21/2024 0415  Gross per 24 hour   Intake 600 ml   Output 2300 ml   Net -1700 ml       Physical Exam  Vitals reviewed.   Constitutional:       General: He is not in acute distress.     Appearance: He is obese. He is not ill-appearing, toxic-appearing or diaphoretic.   HENT:      Mouth/Throat:      Mouth: Mucous membranes are moist.      Pharynx: Oropharynx is clear.  No oropharyngeal exudate or posterior oropharyngeal erythema.   Eyes:      General: No scleral icterus.        Right eye: No discharge.         Left eye: No discharge.   Cardiovascular:      Rate and Rhythm: Normal rate and regular rhythm.      Heart sounds: No murmur heard.  Pulmonary:      Effort: No respiratory distress.      Breath sounds: Normal breath sounds. No stridor. No wheezing, rhonchi or rales.      Comments: No cough, wheezing, hypoxia noted  Abdominal:      General: There is no distension.      Tenderness: There is no abdominal tenderness. There is no guarding.   Musculoskeletal:      Right lower leg: No edema.      Left lower leg: No edema.   Skin:     General: Skin is warm and dry.      Coloration: Skin is not jaundiced or pale.      Findings: No bruising, erythema, lesion or rash.   Neurological:      Mental Status: He is alert.      Comments: Awake and interactive.  Dysarthria noted   Psychiatric:         Mood and Affect: Mood normal.         Thought Content: Thought content normal.           Lines/Drains:          Lab Results: I have reviewed the following results:   Results from last 7 days   Lab Units 10/21/24  0431 10/17/24  0557 10/16/24  0504   WBC Thousand/uL 7.65   < > 7.27   HEMOGLOBIN g/dL 10.4*   < > 10.8*   HEMATOCRIT % 31.9*   < > 32.8*   PLATELETS Thousands/uL 195   < > 177   SEGS PCT %  --   --  63   LYMPHO PCT %  --   --  23   MONO PCT %  --   --  9   EOS PCT %  --   --  4    < > = values in this interval not displayed.     Results from last 7 days   Lab Units 10/21/24  0431 10/18/24  0644 10/17/24  0459   SODIUM mmol/L 140   < > 138   POTASSIUM mmol/L 4.2   < > 5.1   CHLORIDE mmol/L 110*   < > 112*   CO2 mmol/L 22   < > 18*   BUN mg/dL 39*   < > 26*   CREATININE mg/dL 4.57*   < > 3.52*   ANION GAP mmol/L 8   < > 8   CALCIUM mg/dL 8.5   < > 8.7   ALBUMIN g/dL  --   --  3.0*   TOTAL BILIRUBIN mg/dL  --   --  0.43   ALK PHOS U/L  --   --  69   ALT U/L  --   --  9   AST U/L  --   --   21   GLUCOSE RANDOM mg/dL 91   < > 152*    < > = values in this interval not displayed.     Results from last 7 days   Lab Units 10/17/24  0459   INR  1.13     Results from last 7 days   Lab Units 10/21/24  0719 10/20/24  2119 10/20/24  1547 10/20/24  1149 10/20/24  0745 10/19/24  2125 10/19/24  1535 10/19/24  1110 10/19/24  0735 10/18/24  2103 10/18/24  1132 10/18/24  0753   POC GLUCOSE mg/dl 103 129 137 135 114 157* 154* 181* 138 176* 166* 137     Results from last 7 days   Lab Units 10/16/24  0504 10/15/24  0553   HEMOGLOBIN A1C % 8.6* 8.1*     Results from last 7 days   Lab Units 10/20/24  0440 10/19/24  0447 10/18/24  0644 10/17/24  1522 10/17/24  0459   LACTIC ACID mmol/L  --   --   --  0.8  --    PROCALCITONIN ng/ml 0.26* 0.29* 0.29*  --  0.20       Recent Cultures (last 7 days):   Results from last 7 days   Lab Units 10/17/24  1522 10/17/24  1027   BLOOD CULTURE  No Growth at 72 hrs. Actinomyces odontolyticus*   GRAM STAIN RESULT   --  Gram positive rods*       CT C/A/P    Last 24 Hours Medication List:     Current Facility-Administered Medications:     acetaminophen (TYLENOL) tablet 650 mg, Q6H PRN    aspirin (ECOTRIN LOW STRENGTH) EC tablet 81 mg, Daily    atorvastatin (LIPITOR) tablet 40 mg, QPM    buPROPion (WELLBUTRIN) tablet 100 mg, BID    chlorhexidine (PERIDEX) 0.12 % oral rinse 15 mL, Q12H MIGEL    clopidogrel (PLAVIX) tablet 75 mg, Daily    heparin (porcine) subcutaneous injection 7,500 Units, Q8H MIGEL    hydrALAZINE (APRESOLINE) injection 10 mg, Q6H PRN    insulin lispro (HumALOG/ADMELOG) 100 units/mL subcutaneous injection 2-12 Units, TID AC **AND** Fingerstick Glucose (POCT), TID AC    labetalol (NORMODYNE) tablet 200 mg, BID    NIFEdipine (PROCARDIA XL) 24 hr tablet 90 mg, Daily    OLANZapine (ZyPREXA) tablet 10 mg, HS    pantoprazole (PROTONIX) EC tablet 40 mg, Early Morning    polyethylene glycol (MIRALAX) packet 17 g, Daily PRN    torsemide (DEMADEX) tablet 20 mg, Daily    traZODone (DESYREL)  tablet 50 mg, HS PRN    Valbenazine Tosylate CAPS 40 mg, HS    Administrative Statements   Today, Patient Was Seen By: Martine Lay PA-C      **Please Note: This note may have been constructed using a voice recognition system.**

## 2024-10-21 NOTE — ASSESSMENT & PLAN NOTE
Current Rx: Labetalol 200 mg twice daily, nifedipine 90 mg daily, torsemide 20 mg daily  Changes: No changes today as blood pressure is better controlled

## 2024-10-21 NOTE — TELEPHONE ENCOUNTER
NO-SHOW LETTER MAILED TO Tommie Taylor.  ADDRESS: [address] [place] [id number]  SENT VIA [place]

## 2024-10-21 NOTE — PLAN OF CARE
"  Problem: OCCUPATIONAL THERAPY ADULT  Goal: Performs self-care activities at highest level of function for planned discharge setting.  See evaluation for individualized goals.  Description: Treatment Interventions: ADL retraining, Functional transfer training, UE strengthening/ROM, Endurance training, Cognitive reorientation, Patient/family training, Equipment evaluation/education, Neuromuscular reeducation, Fine motor coordination activities, Compensatory technique education, Continued evaluation, Energy conservation, Activityengagement  Equipment Recommended: Bedside commode       See flowsheet documentation for full assessment, interventions and recommendations.   Outcome: Progressing  Note: Limitation: Decreased ADL status, Decreased UE ROM, Decreased UE strength, Decreased Safe judgement during ADL, Decreased fine motor control, Decreased self-care trans, Decreased high-level ADLs (R estefania-paresis, motor planning, safety awareness, balance, activity tolerance)  Prognosis: Fair  Assessment: Pt seen on this date for skilled OT treatment session. At start of session pt supine in bed. Pt agreeable and motivated to participate in session stating \"I've been wanting to get up\". Pt demonstrating improved performance with bed mobility, sitting tolerance, alertness, activity tolerance, direction following. Demonstrating improved performance with UB and LB ADLs. Improved sit >< stand transfers and SPT. Demonstrating improved coordination of RUE. Edu provided on ther-ex for RUE with good understanding in order to improved RUE strength and coordination for participation in ADL and functional tasks. Would benefit from continued education for carryover and continued progress. Pt would continue to benefit from skilled OT treatment sessions in order to address remaining deficits     Rehab Resource Intensity Level, OT: I (Maximum Resource Intensity)          "

## 2024-10-21 NOTE — ASSESSMENT & PLAN NOTE
Noted to be more lethargic on exam 10/17   Head CT negative  VBG without retention, ammonia WNL   New onset fevers Tmax 100.3. Infectious work up as noted  resolved

## 2024-10-22 ENCOUNTER — APPOINTMENT (INPATIENT)
Dept: NEUROLOGY | Facility: HOSPITAL | Age: 59
DRG: 061 | End: 2024-10-22
Payer: MEDICARE

## 2024-10-22 ENCOUNTER — APPOINTMENT (INPATIENT)
Dept: CT IMAGING | Facility: HOSPITAL | Age: 59
DRG: 061 | End: 2024-10-22
Payer: MEDICARE

## 2024-10-22 PROBLEM — R29.90 STROKE-LIKE SYMPTOMS: Status: ACTIVE | Noted: 2024-10-22

## 2024-10-22 LAB
ALBUMIN SERPL ELPH-MCNC: 2.59 G/DL (ref 3.2–5.1)
ALBUMIN SERPL ELPH-MCNC: 46.2 % (ref 48–70)
ALPHA1 GLOB SERPL ELPH-MCNC: 0.36 G/DL (ref 0.15–0.47)
ALPHA1 GLOB SERPL ELPH-MCNC: 6.4 % (ref 1.8–7)
ALPHA2 GLOB SERPL ELPH-MCNC: 0.82 G/DL (ref 0.42–1.04)
ALPHA2 GLOB SERPL ELPH-MCNC: 14.7 % (ref 5.9–14.9)
ANION GAP SERPL CALCULATED.3IONS-SCNC: 7 MMOL/L (ref 4–13)
BACTERIA BLD CULT: NORMAL
BETA GLOB ABNORMAL SERPL ELPH-MCNC: 0.41 G/DL (ref 0.31–0.57)
BETA1 GLOB SERPL ELPH-MCNC: 7.4 % (ref 4.7–7.7)
BETA2 GLOB SERPL ELPH-MCNC: 6.3 % (ref 3.1–7.9)
BETA2+GAMMA GLOB SERPL ELPH-MCNC: 0.35 G/DL (ref 0.2–0.58)
BUN SERPL-MCNC: 41 MG/DL (ref 5–25)
CALCIUM SERPL-MCNC: 8.7 MG/DL (ref 8.4–10.2)
CHLORIDE SERPL-SCNC: 111 MMOL/L (ref 96–108)
CO2 SERPL-SCNC: 24 MMOL/L (ref 21–32)
CREAT SERPL-MCNC: 4.32 MG/DL (ref 0.6–1.3)
GAMMA GLOB ABNORMAL SERPL ELPH-MCNC: 1.06 G/DL (ref 0.4–1.66)
GAMMA GLOB SERPL ELPH-MCNC: 19 % (ref 6.9–22.3)
GFR SERPL CREATININE-BSD FRML MDRD: 14 ML/MIN/1.73SQ M
GLUCOSE SERPL-MCNC: 114 MG/DL (ref 65–140)
GLUCOSE SERPL-MCNC: 129 MG/DL (ref 65–140)
GLUCOSE SERPL-MCNC: 82 MG/DL (ref 65–140)
GLUCOSE SERPL-MCNC: 93 MG/DL (ref 65–140)
IGG/ALB SER: 0.86 {RATIO} (ref 1.1–1.8)
POTASSIUM SERPL-SCNC: 4.3 MMOL/L (ref 3.5–5.3)
PROT PATTERN SERPL ELPH-IMP: ABNORMAL
PROT SERPL-MCNC: 5.6 G/DL (ref 6.4–8.2)
SODIUM SERPL-SCNC: 142 MMOL/L (ref 135–147)
VIT B12 SERPL-MCNC: 446 PG/ML (ref 180–914)

## 2024-10-22 PROCEDURE — 99222 1ST HOSP IP/OBS MODERATE 55: CPT | Performed by: PSYCHIATRY & NEUROLOGY

## 2024-10-22 PROCEDURE — 99232 SBSQ HOSP IP/OBS MODERATE 35: CPT | Performed by: PHYSICIAN ASSISTANT

## 2024-10-22 PROCEDURE — 82948 REAGENT STRIP/BLOOD GLUCOSE: CPT

## 2024-10-22 PROCEDURE — 82607 VITAMIN B-12: CPT | Performed by: PHYSICIAN ASSISTANT

## 2024-10-22 PROCEDURE — 84165 PROTEIN E-PHORESIS SERUM: CPT | Performed by: PATHOLOGY

## 2024-10-22 PROCEDURE — 70450 CT HEAD/BRAIN W/O DYE: CPT

## 2024-10-22 PROCEDURE — 99233 SBSQ HOSP IP/OBS HIGH 50: CPT | Performed by: PSYCHIATRY & NEUROLOGY

## 2024-10-22 PROCEDURE — 99232 SBSQ HOSP IP/OBS MODERATE 35: CPT | Performed by: INTERNAL MEDICINE

## 2024-10-22 PROCEDURE — 95816 EEG AWAKE AND DROWSY: CPT

## 2024-10-22 PROCEDURE — 80048 BASIC METABOLIC PNL TOTAL CA: CPT | Performed by: PHYSICIAN ASSISTANT

## 2024-10-22 RX ORDER — CALCIUM CARBONATE 500 MG/1
1000 TABLET, CHEWABLE ORAL 2 TIMES DAILY PRN
Status: DISCONTINUED | OUTPATIENT
Start: 2024-10-22 | End: 2024-10-26 | Stop reason: HOSPADM

## 2024-10-22 RX ORDER — LABETALOL 100 MG/1
300 TABLET, FILM COATED ORAL 2 TIMES DAILY
Status: DISCONTINUED | OUTPATIENT
Start: 2024-10-22 | End: 2024-10-26 | Stop reason: HOSPADM

## 2024-10-22 RX ADMIN — TORSEMIDE 20 MG: 20 TABLET ORAL at 10:11

## 2024-10-22 RX ADMIN — CHLORHEXIDINE GLUCONATE 15 ML: 1.2 RINSE ORAL at 10:11

## 2024-10-22 RX ADMIN — OLANZAPINE 10 MG: 10 TABLET, FILM COATED ORAL at 22:26

## 2024-10-22 RX ADMIN — ASPIRIN 81 MG: 81 TABLET, COATED ORAL at 10:10

## 2024-10-22 RX ADMIN — CLOPIDOGREL BISULFATE 75 MG: 75 TABLET ORAL at 10:10

## 2024-10-22 RX ADMIN — BUPROPION HYDROCHLORIDE TABLETS 100 MG: 100 TABLET, FILM COATED ORAL at 10:10

## 2024-10-22 RX ADMIN — CHLORHEXIDINE GLUCONATE 15 ML: 1.2 RINSE ORAL at 22:26

## 2024-10-22 RX ADMIN — HEPARIN SODIUM 7500 UNITS: 5000 INJECTION INTRAVENOUS; SUBCUTANEOUS at 22:26

## 2024-10-22 RX ADMIN — BUPROPION HYDROCHLORIDE TABLETS 100 MG: 100 TABLET, FILM COATED ORAL at 18:17

## 2024-10-22 RX ADMIN — HEPARIN SODIUM 7500 UNITS: 5000 INJECTION INTRAVENOUS; SUBCUTANEOUS at 14:00

## 2024-10-22 RX ADMIN — ATORVASTATIN CALCIUM 40 MG: 40 TABLET, FILM COATED ORAL at 18:17

## 2024-10-22 RX ADMIN — VALBENAZINE 40 MG: 40 CAPSULE ORAL at 22:29

## 2024-10-22 RX ADMIN — NIFEDIPINE 90 MG: 30 TABLET, FILM COATED, EXTENDED RELEASE ORAL at 10:10

## 2024-10-22 RX ADMIN — CALCIUM CARBONATE 1000 MG: 500 TABLET, CHEWABLE ORAL at 22:26

## 2024-10-22 RX ADMIN — HEPARIN SODIUM 7500 UNITS: 5000 INJECTION INTRAVENOUS; SUBCUTANEOUS at 06:19

## 2024-10-22 RX ADMIN — LABETALOL HYDROCHLORIDE 300 MG: 100 TABLET, FILM COATED ORAL at 18:17

## 2024-10-22 NOTE — PROGRESS NOTES
NEPHROLOGY HOSPITAL PROGRESS NOTE   Tommie Taylor 59 y.o. male MRN: [id number]  Unit/Bed#: S -01 Encounter: [id number]  Reason for Consult: ROSINA on CKD    Assessment & Plan  Acute kidney injury superimposed on stage 4 chronic kidney disease (HCC)  Etiology: ATN from contrast associated nephropathy (10/14) versus TMA in setting of uncontrolled hypertension  Baseline creatinine around 2.9  Creatinine on admission 3.24 on 10/14  Creatinine yesterday 4.57  Creatinine today slightly better at 4.32  UA: Microhematuria (RBC 30-50), proteinuria  UPCR: 8.4 g prior to admission  Kidney ultrasound: No hydronephrosis  No hypocomplementemia  PLA2R negative  SPEP showing abnormal distribution in gamma region, previous immunofixation showed no monoclonal immunoglobulin  Continue to monitor for renal recovery  No indication for renal replacement therapy  Trend BMP daily  Hypertensive emergency  Blood pressure currently above goal  Current Rx: Labetalol 200 mg twice daily, nifedipine 90 mg daily, torsemide 20 mg daily  Changes: Increase labetalol to 300 mg twice daily    CVA (cerebral vascular accident) (HCC)  Status post TNK administration  Currently on dual antiplatelet therapy with aspirin and Plavix and atorvastatin  Management per primary team and neurology  Bipolar I disorder, severe, current or most recent episode depressed, with psychotic features (HCC)  Management per primary team  SIRS (systemic inflammatory response syndrome) ([facility])  UA negative for infection  Chest x-ray: No acute cardiopulmonary disease  Lactic acid within normal limits  CT chest/abdomen/pelvis: No evidence of infectious process  Currently being monitored/observed off antibiotic    Discussed with internal medicine team.  After discussion, we agreed that kidney function has slightly improved today and to monitor kidney function and to increase labetalol to 300 mg twice daily.    SUBJECTIVE / 24H INTERVAL HISTORY:  Urine output recorded as 1150 cc.   He was awake and alert for me and answered questions appropriately.    OBJECTIVE:  Current Weight: Weight - Scale: 122 kg (269 lb 2.9 oz)  Vitals:    10/21/24 2108 10/22/24 0600 10/22/24 0714 10/22/24 0716   BP: 150/88  (!) 188/98 (!) 173/99   Pulse: 71  78 85   Resp:       Temp: 98.2 °F (36.8 °C)  98.1 °F (36.7 °C) 98.1 °F (36.7 °C)   TempSrc:       SpO2: 96%  99% 98%   Weight:  122 kg (269 lb 2.9 oz)     Height:           Intake/Output Summary (Last 24 hours) at 10/22/2024 1205  Last data filed at 10/22/2024 0900  Gross per 24 hour   Intake 500 ml   Output 1481 ml   Net -981 ml     Review of Systems   Constitutional:  Negative for chills and fever.   HENT:  Negative for ear pain and sore throat.    Eyes:  Negative for pain and visual disturbance.   Respiratory:  Negative for cough and shortness of breath.    Cardiovascular:  Negative for chest pain and palpitations.   Gastrointestinal:  Negative for abdominal pain and vomiting.   Genitourinary:  Negative for dysuria and hematuria.   Musculoskeletal:  Negative for arthralgias and back pain.   Skin:  Negative for color change and rash.   Neurological:  Negative for seizures and syncope.   All other systems reviewed and are negative.    Physical Exam  Vitals and nursing note reviewed.   Constitutional:       General: He is not in acute distress.     Appearance: He is well-developed.   HENT:      Head: Normocephalic and atraumatic.   Eyes:      Conjunctiva/sclera: Conjunctivae normal.   Cardiovascular:      Rate and Rhythm: Normal rate and regular rhythm.      Pulses: Normal pulses.      Heart sounds: Normal heart sounds. No murmur heard.  Pulmonary:      Effort: Pulmonary effort is normal. No respiratory distress.      Breath sounds: Normal breath sounds.   Abdominal:      Palpations: Abdomen is soft.      Tenderness: There is no abdominal tenderness.   Musculoskeletal:         General: No swelling.      Cervical back: Neck supple.      Right lower leg: No edema.       Left lower leg: No edema.   Skin:     General: Skin is warm and dry.      Capillary Refill: Capillary refill takes less than 2 seconds.   Neurological:      Mental Status: He is alert.   Psychiatric:         Mood and Affect: Mood normal.       Medications:    Current Facility-Administered Medications:     acetaminophen (TYLENOL) tablet 650 mg, 650 mg, Oral, Q6H PRN, Macey Almeida PA-C, 650 mg at 10/17/24 1422    aspirin (ECOTRIN LOW STRENGTH) EC tablet 81 mg, 81 mg, Oral, Daily, Rose Miner MD, 81 mg at 10/22/24 1010    atorvastatin (LIPITOR) tablet 40 mg, 40 mg, Oral, QPM, Rose Miner MD, 40 mg at 10/21/24 1755    buPROPion (WELLBUTRIN) tablet 100 mg, 100 mg, Oral, BID, Rose Miner MD, 100 mg at 10/22/24 1010    chlorhexidine (PERIDEX) 0.12 % oral rinse 15 mL, 15 mL, Mouth/Throat, Q12H MIGEL, Rose Miner MD, 15 mL at 10/22/24 1011    clopidogrel (PLAVIX) tablet 75 mg, 75 mg, Oral, Daily, Rose Miner MD, 75 mg at 10/22/24 1010    heparin (porcine) subcutaneous injection 7,500 Units, 7,500 Units, Subcutaneous, Q8H MIGEL, Rose Miner MD, 7,500 Units at 10/22/24 0619    hydrALAZINE (APRESOLINE) injection 10 mg, 10 mg, Intravenous, Q6H PRN, Rose Miner MD, 10 mg at 10/17/24 1430    insulin lispro (HumALOG/ADMELOG) 100 units/mL subcutaneous injection 2-12 Units, 2-12 Units, Subcutaneous, TID AC, 2 Units at 10/19/24 1808 **AND** Fingerstick Glucose (POCT), , , TID AC, Rose Miner MD    labetalol (NORMODYNE) tablet 300 mg, 300 mg, Oral, BID, Johny Lombardo MD    NIFEdipine (PROCARDIA XL) 24 hr tablet 90 mg, 90 mg, Oral, Daily, Joselyn Reyes Bahamonde, MD, 90 mg at 10/22/24 1010    OLANZapine (ZyPREXA) tablet 10 mg, 10 mg, Oral, HS, Rose Miner MD, 10 mg at 10/21/24 2109    pantoprazole (PROTONIX) EC tablet 40 mg, 40 mg, Oral, Early Morning, Rose Miner MD, 40 mg at 10/21/24 0417    polyethylene glycol (MIRALAX) packet 17 g, 17 g, Oral, Daily PRN, Rose Miner MD    torsemide (DEMADEX) tablet 20  "mg, 20 mg, Oral, Daily, Joselyn Reyes Bahamonde, MD, 20 mg at 10/22/24 1011    traZODone (DESYREL) tablet 50 mg, 50 mg, Oral, HS PRN, Rose Miner MD, 50 mg at 10/21/24 2109    Valbenazine Tosylate CAPS 40 mg, 40 mg, Oral, HS, Rose Miner MD, 40 mg at 10/21/24 2110    Laboratory Results:  Results from last 7 days   Lab Units 10/22/24  1020 10/21/24  0431 10/20/24  0809 10/20/24  0440 10/19/24  0447 10/18/24  0644 10/17/24  0557 10/17/24  0459 10/16/24  0504   WBC Thousand/uL  --  7.65  --  6.86 7.54 8.28 8.35  --  7.27   HEMOGLOBIN g/dL  --  10.4*  --  9.3* 10.1* 10.3* 10.9*  --  10.8*   HEMATOCRIT %  --  31.9*  --  28.7* 30.8* 31.5* 32.7*  --  32.8*   PLATELETS Thousands/uL  --  195  --  178 206 212 194  --  177   POTASSIUM mmol/L 4.3 4.2 4.4  --  4.7 4.6  --  5.1 4.1   CHLORIDE mmol/L 111* 110* 112*  --  113* 112*  --  112* 110*   CO2 mmol/L 24 22 23  --  23 22  --  18* 22   BUN mg/dL 41* 39* 36*  --  36* 33*  --  26* 27*   CREATININE mg/dL 4.32* 4.57* 4.43*  --  4.38* 4.19*  --  3.52* 3.27*   CALCIUM mg/dL 8.7 8.5 8.6  --  8.5 8.8  --  8.7 8.5   MAGNESIUM mg/dL  --   --   --   --   --   --   --  2.1 2.0   PHOSPHORUS mg/dL  --   --   --   --   --   --   --  3.8 4.0       Portions of the record may have been created with voice recognition software. Occasional wrong word or \"sound a like\" substitutions may have occurred due to the inherent limitations of voice recognition software. Read the chart carefully and recognize, using context, where substitutions have occurred.If you have any questions, please contact the dictating provider.    "

## 2024-10-22 NOTE — CONSULTS
Consultation - Behavioral Health   Tommie Taylor 59 y.o. male MRN: [id number]  Unit/Bed#: S -01 Encounter: [id number]    Assessment & Plan   Assessment:  Tommie Taylor is a 59 y.o. male, domiciled in a home with his girlfriend and niece, on disability, with past medical history of stage 4 CKD, HTN & T2DM, and past psychiatric history of bipolar I disorder, THERESE with panic attacks, and tardive dyskinesia, currently admitted to the medical floor following a left corona radiata stroke. Psychiatry was consulted for encephalopathy and medication review. During interview, Tommie is alert and oriented to person, place, time, and situation. It was reported that he was found confused and naked in his bed earlier this morning. His current psychiatric med regimen is Zyprexa 10 mg QHS, Ingrezza 40 mg daily, Wellbutrin 100 mg BID, and trazodone 50 mg HS PRN. There is no need to make any psychiatric medication changes at this time as it is unlikely this regimen is contributing to his acutely worsening confusion.   Assessment & Plan  Bipolar I disorder, severe, current or most recent episode depressed, with psychotic features (HCC)  Plan:   Medical management per primary team.  Medication recommendations:  Continue current psychiatric medication regimen, including Wellbutrin 100 mg 2 times daily, Zyprexa 10 mg daily at bedtime, and valbenazine 40 mg daily at bedtime.  Continue as needed trazodone 50 mg at bedtime for insomnia.  If patient appears to be in an activated/agitated state, can hold Wellbutrin.  Consultation appreciated. Psychiatry to sign off. Please do not hesitate to call/contact our service with any additional comments/concerns. Please call/contact on-call Psychiatry via [employer] including on-call psychiatric service via [employer] ([phone]) with any comments/concerns if after hours/Fri/Sat/Sunday.Thank you!   Tardive dyskinesia    Encephalopathy         History of Present Illness   Physician Requesting  "Consult: Ale Saba*  Reason for Consult / Principal Problem: Do any of patient's psych meds need to be changed in the setting of new stroke and encephalopathy?  Dx 1.  Encephalopathy Dx 2.  Tardive dyskinesia Dx 3.  Bipolar 1 disorder    Chief Complaint: \"I am here because of stroke\"    Tommie Taylor is a 59 y.o. male, domiciled in a home with his girlfriend and niece, on disability, with past medical history of stage 4 CKD, HTN & T2DM, and past psychiatric history of bipolar I disorder, THERESE with panic attacks, and tardive dyskinesia, currently admitted to the medical floor following a left corona radiata stroke.  Neurology was consulted and recommended DAPT therapy for 21 days, Lipitor, and outpatient Zio patch.  The patient is also experiencing hypertensive emergency with blood pressure this morning of 188/98.  Nephrology is consulted for ROSINA superimposed on stage IV CKD with current creatinine 4.32.  Psychiatry was consulted to assess if his current psychiatric medication may be contributing to his recent encephalopathy/confusion.    At the time of interview, Tommie is alert, oriented to person, place, date, and situation.  He is linear and organized in thought process, though does appear confused at times with some difficulty in memory.  His significant other is on speaker on his phone during evaluation.  He understands he is in the hospital because of a stroke.  He admits to some confusion that and includes feeling as though he is somewhere else.  He has been struggling with mild confusion and memory difficulties for the last few years per collateral from his significant other.  Tommie feels this confusion has gotten slightly worse since his stroke.  He follows with an outpatient psychiatrist at [facility], and has had some changes in his medications recently including an increase in his Ingrezza.  He is unsure which medications he is on, though his significant other ensures he takes them " daily.  Through chart review, it appears he has been taking his current dose of Zyprexa and Wellbutrin for a few months now.  He endorses some depression and anxiety, but denies SI, HI, or AVH.  He has been admitted to the inpatient psychiatric unit once many years ago, and attempted suicide once many years ago.    Psychiatric Review Of Systems:  sleep: Alternates between insomnia and normal  appetite changes: Reports a long term decreased appetite  weight changes: no  energy/anergy: yes, decreased  interest/pleasure/anhedonia: no  somatic symptoms: no  anxiety/panic: yes  efrem: yes  guilty/hopeless: no  self injurious behavior/risky behavior: no    Historical Information   Past Psychiatric History:   IP: 1 prior hospitalization many years ago  OP: Currently follows with [facility] outpatient psychiatry  Past Psychiatric medication trials: Zyprexa, Wellbutrin, Ingrezza, BuSpar, Klonopin, trazodone  Past Suicide attempts: 1 many years ago  Self harming behaviors: Denies  Past Violent behavior: Unknown    Substance Abuse History:  Tobacco: Denies  Alcohol: 1 drink every few months  Marijuana: Smokes once every few months   Denies other substances including LSD, PCP, K2, opioids including heroin, meth, cocaine, and recreational benzodiazepines.     I have assessed this patient for substance use within the past 12 months      Family Psychiatric History:   Psychiatric diagnoses: 2 sisters with bipolar disorder  Substance use disorders: None  Attempted/committed suicides: 1 sister attempted suicide    Social History:  Education: high school diploma/GED  Marital history: co-habitating  Living arrangement:  Lives in a home with his significant other and niece.  Occupational History: On disability  Functioning Relationships: good support system.  Access to weapons: Has a gun in the home that is locked.  Patient does not have access per significant other.   history: Denies  Legal history: Arrested when he was a  teenager    Traumatic History:   Abuse:  Denies  Other Traumatic Events:  Death of multiple family members    Medical History:  History of seizures: Denies  History of head injuries: Denies    Past Medical History:   Diagnosis Date    ADHD, adult residual type     Anxiety     Anxiety     Bipolar 1 disorder (HCC)     Cataract     Left eye    Chronic kidney disease     Colon polyp     CPAP (continuous positive airway pressure) dependence     Depression     Depression     Diabetes mellitus (HCC)     blood sugar 167 on admission    Equinus deformity of foot     GERD (gastroesophageal reflux disease)     Homicidal ideations     Hyperlipidemia     Hypertension     Microalbuminuria     Morbid obesity (HCC)     Neuropathy     Shortness of breath     Sleep apnea     CPAP at bedtime    Sleep apnea     Stroke (HCC)     Suicidal intent        Medical Review Of Systems:  Review of Systems - Negative except as noted above    Meds/Allergies   all current active meds have been reviewed and current meds:   Current Facility-Administered Medications:     acetaminophen (TYLENOL) tablet 650 mg, Q6H PRN    aspirin (ECOTRIN LOW STRENGTH) EC tablet 81 mg, Daily    atorvastatin (LIPITOR) tablet 40 mg, QPM    buPROPion (WELLBUTRIN) tablet 100 mg, BID    chlorhexidine (PERIDEX) 0.12 % oral rinse 15 mL, Q12H MIGEL    clopidogrel (PLAVIX) tablet 75 mg, Daily    heparin (porcine) subcutaneous injection 7,500 Units, Q8H MIGEL    hydrALAZINE (APRESOLINE) injection 10 mg, Q6H PRN    insulin lispro (HumALOG/ADMELOG) 100 units/mL subcutaneous injection 2-12 Units, TID AC **AND** Fingerstick Glucose (POCT), TID AC    labetalol (NORMODYNE) tablet 300 mg, BID    NIFEdipine (PROCARDIA XL) 24 hr tablet 90 mg, Daily    OLANZapine (ZyPREXA) tablet 10 mg, HS    pantoprazole (PROTONIX) EC tablet 40 mg, Early Morning    polyethylene glycol (MIRALAX) packet 17 g, Daily PRN    torsemide (DEMADEX) tablet 20 mg, Daily    traZODone (DESYREL) tablet 50 mg, HS PRN     "Valbenazine Tosylate CAPS 40 mg, HS  Allergies   Allergen Reactions    Pollen Extract Nasal Congestion    Tetanus Toxoid Swelling       Objective   Vital signs in last 24 hours:  Temp:  [97.4 °F (36.3 °C)-98.2 °F (36.8 °C)] 98.1 °F (36.7 °C)  HR:  [69-85] 85  BP: (150-188)/(88-99) 173/99  Resp:  [18] 18  SpO2:  [96 %-99 %] 98 %      Intake/Output Summary (Last 24 hours) at 10/22/2024 1306  Last data filed at 10/22/2024 1201  Gross per 24 hour   Intake 440 ml   Output 1506 ml   Net -1066 ml       Mental Status Evaluation:  Appearance:  Appears stated age, appropriate grooming and hygiene, wearing hospital attire, facial hair, intermittent eye contact   Behavior:  calm, cooperative, and laying comfortably in bed, restless at times   Speech:  normal rate, soft, and dysarthric   Mood:  \"Okay\"   Affect:  Constricted but reactive   Language: Within normal limits   Thought Process:  organized and linear   Associations: intact associations   Thought Content:  No verbalized delusions or overt paranoia   Perceptual Disturbances: Denies auditory and visual hallucinations, does not appear to be internally preoccupied or distracted   Risk Potential: Suicidal Ideations none, Homicidal Ideations none, and Potential for Aggression No   Sensorium:  person, place, time/date, situation, month of year, and year   Memory:  recent and remote memory grossly intact   Cognition:  Appears confused at times with difficulty remembering certain names   Consciousness:  alert and awake    Attention: attention span appeared shorter than expected for age   Intellect: not examined   Fund of Knowledge: Within normal limits   Insight:  fair   Judgment: fair   Muscle Strength and Tone: Not assessed   Gait/Station: not assessed, patient in bed   Motor Activity: no abnormal movements     Suicide/Homicide Risk Assessment:    Risk of Harm to Self:   The following ratings are based on assessment at the time of the interview and review of records  Nursing " Suicide Risk Assessment Last 24 hours:    Demographic risk factors include: male, age: over 50 or older  Historical Risk Factors include: chronic anxiety symptoms, chronic mood disorder, history of psychosis, history of suicide attempt  Current Specific Risk Factors include: diagnosis of mood disorder, health problems  Protective Factors: no current suicidal plan or intent, compliant with medications, compliant with mental health treatment, safe and stable living environment, supportive family  Weapons/Firearms:  gun in home that is locked. . The following steps have been taken to ensure weapons are properly secured:  locked, patient does not have access  Based on today's assessment, Tommie presents the following risk of harm to self: minimal    Risk of Harm to Others:  The following ratings are based on assessment at the time of the interview and review of records  Nursing Homicide Risk Assessment:    Demographic Risk Factors include: male.  Historical Risk Factors include: history of substance use.  Current Specific Risk Factors include: diagnosis of mood disorder, multiple stressors  Protective Factors: no current homicidal ideation, no current psychotic symptoms, compliant with medications, compliant with treatment, outpatient psychiatric follow up established, stable living environment, good support system, supportive family, safe and stable living environment, access to mental health treatment  Based on today's assessment, Tommie presents the following risk of harm to others: minimal    -----------------------------------    Lab Results:  I have personally reviewed all pertinent laboratory/tests results.  Most Recent Labs:   Lab Results   Component Value Date    WBC 7.65 10/21/2024    RBC 3.99 10/21/2024    HGB 10.4 (L) 10/21/2024    HCT 31.9 (L) 10/21/2024     10/21/2024    RDW 14.3 10/21/2024    NEUTROABS 4.61 10/16/2024    SODIUM 142 10/22/2024    K 4.3 10/22/2024     (H) 10/22/2024    CO2 24  10/22/2024    BUN 41 (H) 10/22/2024    CREATININE 4.32 (H) 10/22/2024    GLUC 129 10/22/2024    GLUF 247 (H) 10/07/2024    CALCIUM 8.7 10/22/2024    AST 21 10/17/2024    ALT 9 10/17/2024    ALKPHOS 69 10/17/2024    TP 5.6 (L) 10/18/2024    ALB 3.0 (L) 10/17/2024    TBILI 0.43 10/17/2024    CHOLESTEROL 154 10/16/2024    HDL 40 10/16/2024    TRIG 120 10/16/2024    LDLCALC 90 10/16/2024    NONHDLC 99 04/19/2024    VALPROICTOT <10 (L) 09/09/2014    LITHIUM <0.2 (L) 12/15/2015    AMMONIA 17 (L) 10/17/2024    [id number] 4.847 (H) 10/07/2024    FREET4 0.68 10/07/2024    RPR Non-Reactive 11/07/2017    HGBA1C 8.6 (H) 10/16/2024     10/16/2024       Code Status: )Level 1 - Full Code    Taina Saul MD  PGY-2

## 2024-10-22 NOTE — PLAN OF CARE
Problem: PAIN - ADULT  Goal: Verbalizes/displays adequate comfort level or baseline comfort level  Description: Interventions:  - Encourage patient to monitor pain and request assistance  - Assess pain using appropriate pain scale  - Administer analgesics based on type and severity of pain and evaluate response  - Implement non-pharmacological measures as appropriate and evaluate response  - Consider cultural and social influences on pain and pain management  - Notify physician/advanced practitioner if interventions unsuccessful or patient reports new pain  Outcome: Progressing     Problem: INFECTION - ADULT  Goal: Absence or prevention of progression during hospitalization  Description: INTERVENTIONS:  - Assess and monitor for signs and symptoms of infection  - Monitor lab/diagnostic results  - Monitor all insertion sites, i.e. indwelling lines, tubes, and drains  - Monitor endotracheal if appropriate and nasal secretions for changes in amount and color  - [place] appropriate cooling/warming therapies per order  - Administer medications as ordered  - Instruct and encourage patient and family to use good hand hygiene technique  - Identify and instruct in appropriate isolation precautions for identified infection/condition  Outcome: Progressing  Goal: Absence of fever/infection during neutropenic period  Description: INTERVENTIONS:  - Monitor WBC    Outcome: Progressing     Problem: SAFETY ADULT  Goal: Patient will remain free of falls  Description: INTERVENTIONS:  - Educate patient/family on patient safety including physical limitations  - Instruct patient to call for assistance with activity   - Consult OT/PT to assist with strengthening/mobility   - Keep Call bell within reach  - Keep bed low and locked with side rails adjusted as appropriate  - Keep care items and personal belongings within reach  - Initiate and maintain comfort rounds  - Make Fall Risk Sign visible to staff  - Offer Toileting every 2 Hours,  in advance of need  - Initiate/Maintain bed alarm  - Obtain necessary fall risk management equipment: bed alarm  - Apply yellow socks and bracelet for high fall risk patients  - Consider moving patient to room near nurses station  Outcome: Progressing  Goal: Maintain or return to baseline ADL function  Description: INTERVENTIONS:  -  Assess patient's ability to carry out ADLs; assess patient's baseline for ADL function and identify physical deficits which impact ability to perform ADLs (bathing, care of mouth/teeth, toileting, grooming, dressing, etc.)  - Assess/evaluate cause of self-care deficits   - Assess range of motion  - Assess patient's mobility; develop plan if impaired  - Assess patient's need for assistive devices and provide as appropriate  - Encourage maximum independence but intervene and supervise when necessary  - Involve family in performance of ADLs  - Assess for home care needs following discharge   - Consider OT consult to assist with ADL evaluation and planning for discharge  - Provide patient education as appropriate  Outcome: Progressing       Problem: DISCHARGE PLANNING  Goal: Discharge to home or other facility with appropriate resources  Description: INTERVENTIONS:  - Identify barriers to discharge w/patient and caregiver  - Arrange for needed discharge resources and transportation as appropriate  - Identify discharge learning needs (meds, wound care, etc.)  - Arrange for interpretive services to assist at discharge as needed  - Refer to Case Management Department for coordinating discharge planning if the patient needs post-hospital services based on physician/advanced practitioner order or complex needs related to functional status, cognitive ability, or social support system  Outcome: Progressing     Problem: Knowledge Deficit  Goal: Patient/family/caregiver demonstrates understanding of disease process, treatment plan, medications, and discharge instructions  Description: Complete  learning assessment and assess knowledge base.  Interventions:  - Provide teaching at level of understanding  - Provide teaching via preferred learning methods  Outcome: Progressing     Problem: Neurological Deficit  Goal: Neurological status is stable or improving  Description: Interventions:  - Monitor and assess patient's level of consciousness, motor function, sensory function, and level of assistance needed for ADLs.   - Monitor and report changes from baseline. Collaborate with interdisciplinary team to initiate plan and implement interventions as ordered.   - Provide and maintain a safe environment.  - Consider seizure precautions.  - Consider fall precautions.  - Consider aspiration precautions.  - Consider bleeding precautions.  Outcome: Progressing     Problem: Activity Intolerance/Impaired Mobility  Goal: Mobility/activity is maintained at optimum level for patient  Description: Interventions:  - Assess and monitor patient  barriers to mobility and need for assistive/adaptive devices.  - Assess patient's emotional response to limitations.  - Collaborate with interdisciplinary team and initiate plans and interventions as ordered.  - Encourage independent activity per ability.  - Maintain proper body alignment.  - Perform active/passive rom as tolerated/ordered.  - Plan activities to conserve energy.  - Turn patient as appropriate  Outcome: Progressing     Problem: Communication Impairment  Goal: Ability to express needs and understand communication  Description: Assess patient's communication skills and ability to understand information.  Patient will demonstrate use of effective communication techniques, alternative methods of communication and understanding even if not able to speak.     - Encourage communication and provide alternate methods of communication as needed.  - Collaborate with case management/ for discharge needs.  - Include patient/family/caregiver in decisions related to  communication.  Outcome: Progressing     Problem: Potential for Aspiration  Goal: Non-ventilated patient's risk of aspiration is minimized  Description: Assess and monitor vital signs, respiratory status, and labs (WBC).  Monitor for signs of aspiration (tachypnea, cough, rales, wheezing, cyanosis, fever).    - Assess and monitor patient's ability to swallow.  - Place patient up in chair to eat if possible.  - HOB up at 90 degrees to eat if unable to get patient up into chair.  - Supervise patient during oral intake.   - Instruct patient/ family to take small bites.  - Instruct patient/ family to take small single sips when taking liquids.  - Follow patient-specific strategies generated by speech pathologist.  Outcome: Progressing  Goal: Ventilated patient's risk of aspiration is minimized  Description: Assess and monitor vital signs, respiratory status, airway cuff pressure, and labs (WBC).  Monitor for signs of aspiration (tachypnea, cough, rales, wheezing, cyanosis, fever).    - Elevate head of bed 30 degrees if patient has tube feeding.  - Monitor tube feeding.  Outcome: Progressing     Problem: Nutrition  Goal: Nutrition/Hydration status is improving  Description: Monitor and assess patient's nutrition/hydration status for malnutrition (ex- brittle hair, bruises, dry skin, pale skin and conjunctiva, muscle wasting, smooth red tongue, and disorientation). Collaborate with interdisciplinary team and initiate plan and interventions as ordered.  Monitor patient's weight and dietary intake as ordered or per policy. Utilize nutrition screening tool and intervene per policy. Determine patient's food preferences and provide high-protein, high-caloric foods as appropriate.     - Assist patient with eating.  - Allow adequate time for meals.  - Encourage patient to take dietary supplement as ordered.  - Collaborate with clinical nutritionist.  - Include patient/family/caregiver in decisions related to nutrition.  Outcome:  Progressing     Problem: Prexisting or High Potential for Compromised Skin Integrity  Goal: Skin integrity is maintained or improved  Description: INTERVENTIONS:  - Identify patients at risk for skin breakdown  - Assess and monitor skin integrity  - Assess and monitor nutrition and hydration status  - Monitor labs   - Assess for incontinence   - Turn and reposition patient  - Assist with mobility/ambulation  - Relieve pressure over bony prominences  - Avoid friction and shearing  - Provide appropriate hygiene as needed including keeping skin clean and dry  - Evaluate need for skin moisturizer/barrier cream  - Collaborate with interdisciplinary team   - Patient/family teaching  - Consider wound care consult   Outcome: Progressing

## 2024-10-22 NOTE — ASSESSMENT & PLAN NOTE
Etiology: ATN from contrast associated nephropathy (10/14) versus TMA in setting of uncontrolled hypertension  Baseline creatinine around 2.9  Creatinine on admission 3.24 on 10/14  Creatinine yesterday 4.57  Creatinine today slightly better at 4.32  UA: Microhematuria (RBC 30-50), proteinuria  UPCR: 8.4 g prior to admission  Kidney ultrasound: No hydronephrosis  No hypocomplementemia  PLA2R negative  SPEP showing abnormal distribution in gamma region, previous immunofixation showed no monoclonal immunoglobulin  Continue to monitor for renal recovery  No indication for renal replacement therapy  Trend BMP daily

## 2024-10-22 NOTE — ASSESSMENT & PLAN NOTE
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  This is likely contaminant and does not require further antibiotic treatment  Unclear source of infection, consider possibility of aspiration given new dysphagia from stroke  CT C/A/P wo contrast given ROSINA. No evidence of acute infectious process  After patient was noted to be afebrile 24 hours and no clear source of infection,  IV cefepime and vancomycin were discontinued 10/20/24.  Continue to monitor.  resolved

## 2024-10-22 NOTE — ASSESSMENT & PLAN NOTE
Neurology asked to reevaluate the patient on 10/22/2024 for confusion, reportedly found naked in bed this AM.      Of note, patient was noted to be febrile on 10/17 with a Tmax of 101.3 °F and was also tachycardic.  UA and CXR unremarkable.  Blood cultures 1/2 positive for gram-positive rods with identification panel inconclusive, concerning for contaminant.  CT CAP 10/19 unremarkable.  Appears to have received 3 days of IV cefepime (10/18-10/20), antibiotics discontinued as patient had been afebrile for 24 hours with no clear source of infection.    Repeat CT head this morning 10/22, unremarkable for acute intracranial abnormalities.     Etiology of encephalopathy unclear. Cannot rule out seizures in the setting of acute infarct, however will obtain routine EEG given reported acuity of encephalopathy. Patient found to have documented cognitive impairment at baseline from April 2024.  At that time patient was noted to be occasionally disoriented, misplacing objects, and forgetting if he had eaten or taken his meds.  MoCA was performed in the office setting, patient scoring 18/30 indicating significant cognitive deficits.  Other considerations include delirium in the inpatient hospital setting. Will recommend continuing toxic metabolic workup as well per primary team.     Plan:  Hold off on any further neuroimaging at this time  Routine EEG pending   Would not recommend initiating AEDs at this time  Delirium precautions  Correction of infectious/metabolic derangements per primary team  Medical management supportive care per primary team, notify with changes

## 2024-10-22 NOTE — ASSESSMENT & PLAN NOTE
Blood pressure currently above goal  Current Rx: Labetalol 200 mg twice daily, nifedipine 90 mg daily, torsemide 20 mg daily  Changes: Increase labetalol to 300 mg twice daily

## 2024-10-22 NOTE — ASSESSMENT & PLAN NOTE
Lab Results   Component Value Date    HGBA1C 8.6 (H) 10/16/2024       Recent Labs     10/21/24  1650 10/21/24  2205 10/22/24  0827 10/22/24  1119   POCGLU 136 135 82 114     Blood Sugar Average: Last 72 hrs:  (P) [id number]  A1c reflects poor control but blood sugars have been stable here on sliding scale alone  Currently maintained on semaglutide monotherapy outpatient- hold on admission   SSI with accu checks  Hypoglycemia protocol  Diabetic diet

## 2024-10-22 NOTE — CASE MANAGEMENT
Case Management Progress Note    Patient name Tommie Taylor  Location S /S -01 MRN [id number]  : 1965 Date 10/22/2024       LOS (days): 7  Geometric Mean LOS (GMLOS) (days): 4.8  Days to GMLOS:-2.8        OBJECTIVE:        Current admission status: Inpatient  Preferred Pharmacy:   [id number] [address]  [address] [address]  Phone: [phone] Fax: [phone]    [id number] [address]  [address] [facility] [address]  Phone: [phone] Fax: [phone]    [facility] ([facility]) #[id number] [place], [place] - [address]  [address] [address]  Phone: [phone] Fax: [phone]    Primary Care Provider: No primary care provider on file.    Primary Insurance: MEDICARE  Secondary Insurance: [facility]    PROGRESS NOTE:    Per SLIM - patient not yet ready for d/c. CM provided d/c update to  [facility] via aidin of same.     CM to f/u with [facility] tomorrow pending pt medical stability.

## 2024-10-22 NOTE — ASSESSMENT & PLAN NOTE
Noted to be more lethargic on exam 10/17--then improved  Today on 10/22/2024 patient confused, found naked in bed.  Repeat head CT was stable  Check B12 (low in past)  Consult psych to review his medications and determine if any adjustments need to be made  Requested neuro reassessment

## 2024-10-22 NOTE — PROGRESS NOTES
Progress Note - Hospitalist   Name: Tommie Taylor 59 y.o. male I MRN: [id number]  Unit/Bed#: S -01 I Date of Admission: 10/14/2024   Date of Service: 10/22/2024 I Hospital Day: 7    Assessment & Plan  CVA (cerebral vascular accident) (HCC)  Patient with history of morbid obesity, hypertension and uncontrolled diabetes who presented with right upper and right lower extremity weakness with associated right facial droop, highly suggestive of CVA  CT/CTA head unremarkable for acute process  TnK administered following correction of hypertension with nicardipine drip in the ICU  Following thrombolytic administration patient developed worsening slurred speech and headache, had repeat CT head which was unremarkable  Repeat head CT at 24 hours post TNK negative for signs of bleed  Neurology consult  MRI significant for left corona radiata stroke.  Placed on DAPT therapy for 21 days and then continue aspirin monotherapy indefinitely per neurology.  Started on Lipitor 40 mg per stroke protocol  Recommend Zio patch outpatient to evaluate for any possible arrhythmia  Will need f/u with neurovascular team in 6-8 weeks   PT/OT recommending level 1--pending rehab  SIRS (systemic inflammatory response syndrome) (HCC)  Noted on 10/17/24 for tachycardia and fever 101.3, no additional recurrences since that time  UA negative for infection  CXR: No acute cardiopulmonary disease  Lactic acid WNL  Procalcitonin 0.29-> 0.29->0.26  BC 1/2 positive for acitinomyces odontolyticus--appreciate [facility] infectious disease input.  This is likely contaminant and does not require further antibiotic treatment  Unclear source of infection, consider possibility of aspiration given new dysphagia from stroke  CT C/A/P wo contrast given ROSINA. No evidence of acute infectious process  After patient was noted to be afebrile 24 hours and no clear source of infection,  IV cefepime and vancomycin were discontinued 10/20/24.  Continue to  monitor.  resolved    Hypertensive emergency  Hypertensive emergency on admission in the setting of likely CVA  Evidenced by bp 197/104->231/105 POA  S/p Cardizem drip 10/15  PTA medications: Norvasc 10 mg and labetalol 100 mg twice daily.   Appreciate nephro assistance in BP management   Discontinued Norvasc  Increased nifedipine 90mg daily  10/22 Increased labetolol to 300mg BID  Demadex 20 mg daily  BPs were initially improving but noted to be elevated this morning.  Bipolar I disorder, severe, current or most recent episode depressed, with psychotic features ([facility])  History of Bipolar disorder, follows with psych--will request inpatient evaluation and assessment of medications given increased confusion in the past 24 hours  Continue Zyprexa, buproprion, trazodone  Tardive dyskinesia  Continue valbenazine  Type 2 diabetes mellitus with hyperglycemia, without long-term current use of insulin ([facility])  Lab Results   Component Value Date    HGBA1C 8.6 (H) 10/16/2024       Recent Labs     10/21/24  1650 10/21/24  2205 10/22/24  0827 10/22/24  1119   POCGLU 136 135 82 114     Blood Sugar Average: Last 72 hrs:  (P) [id number]  A1c reflects poor control but blood sugars have been stable here on sliding scale alone  Currently maintained on semaglutide monotherapy outpatient- hold on admission   SSI with accu checks  Hypoglycemia protocol  Diabetic diet  Acute kidney injury superimposed on stage 4 chronic kidney disease (HCC)  Etiology suggested to be secondary to ATN versus TMA in the setting of uncontrolled blood pressure and complicated by contrast associated nephropathy   Baseline creatinine around 2.9  Current creatinine 4.32.  Seems like it is starting to plateau and has remained in the mid 4 range for several days  UA with microhematuria, proteinuria  US kidney and bladder --bladder wall thickening but no hydronephrosis  Continue to monitor intermittent bladder scans to ensure no retention  No indication of  dialysis at this time per nephrology  Avoid nephrotoxic agents  Monitor I&Os  Daily BMP  Nephrology following, input is appreciated.   Encephalopathy  Noted to be more lethargic on exam 10/17--then improved  Today on 10/22/2024 patient confused, found naked in bed.  Repeat head CT was stable  Check B12 (low in past)  Consult psych to review his medications and determine if any adjustments need to be made  Requested neuro reassessment    VTE Pharmacologic Prophylaxis:    sc heparin    Mobility:   Basic Mobility Inpatient Raw Score: 12  JH-HLM Goal: 4: Move to chair/commode  JH-HLM Achieved: 2: Bed activities/Dependent transfer  JH-HLM Goal NOT achieved. Continue with multidisciplinary rounding and encourage appropriate mobility to improve upon JH-HLM goals.    Patient Centered Rounds:  spoke with RN    Discussions with Specialists or Other Care Team Provider: case mgmt, neuro    Education and Discussions with Family / Patient: Updated  (significant other) via phone.    Current Length of Stay: 7 day(s)  Current Patient Status: Inpatient   Certification Statement: The patient will continue to require additional inpatient hospital stay due to encephalopathy  Discharge Plan: Anticipate discharge in 24-48 hrs to rehab facility.  Pending accepting facility and medical stability    Code Status: Level 1 - Full Code    Subjective   Per report from nursing patient was confused, restless and trying to get out of bed overnight.  He did not sleep well.  During my evaluation patient could not appropriately provide any history    Objective :  Temp:  [97.4 °F (36.3 °C)-98.2 °F (36.8 °C)] 98.1 °F (36.7 °C)  HR:  [69-85] 85  BP: (150-188)/(88-99) 173/99  Resp:  [18] 18  SpO2:  [96 %-99 %] 98 %    Body mass index is 43.45 kg/m².     Input and Output Summary (last 24 hours):     Intake/Output Summary (Last 24 hours) at 10/22/2024 1146  Last data filed at 10/22/2024 0900  Gross per 24 hour   Intake 500 ml   Output 1481 ml    Net -981 ml       Physical Exam  Vitals reviewed.   Constitutional:       General: He is not in acute distress.     Appearance: He is obese. He is not ill-appearing, toxic-appearing or diaphoretic.      Comments: Morbidly obese gentleman seen laying on his side in bed, naked and malpositioned   Eyes:      General: No scleral icterus.        Right eye: No discharge.         Left eye: No discharge.      Conjunctiva/sclera: Conjunctivae normal.   Cardiovascular:      Rate and Rhythm: Normal rate and regular rhythm.      Heart sounds: No murmur heard.  Pulmonary:      Effort: No respiratory distress.      Breath sounds: No stridor. No wheezing or rhonchi.   Abdominal:      Palpations: Abdomen is soft.      Tenderness: There is no guarding.   Musculoskeletal:      Right lower leg: No edema.      Left lower leg: No edema.   Skin:     General: Skin is warm and dry.      Coloration: Skin is not jaundiced or pale.      Findings: No bruising, erythema, lesion or rash.   Neurological:      Mental Status: He is alert.      Comments: Awake alert, significantly dysarthric   Psychiatric:      Comments: Calm cooperative no agitation noted           Lines/Drains:        Lab Results: I have reviewed the following results:   Results from last 7 days   Lab Units 10/21/24  0431 10/17/24  0557 10/16/24  0504   WBC Thousand/uL 7.65   < > 7.27   HEMOGLOBIN g/dL 10.4*   < > 10.8*   HEMATOCRIT % 31.9*   < > 32.8*   PLATELETS Thousands/uL 195   < > 177   SEGS PCT %  --   --  63   LYMPHO PCT %  --   --  23   MONO PCT %  --   --  9   EOS PCT %  --   --  4    < > = values in this interval not displayed.     Results from last 7 days   Lab Units 10/22/24  1020 10/18/24  0644 10/17/24  0459   SODIUM mmol/L 142   < > 138   POTASSIUM mmol/L 4.3   < > 5.1   CHLORIDE mmol/L 111*   < > 112*   CO2 mmol/L 24   < > 18*   BUN mg/dL 41*   < > 26*   CREATININE mg/dL 4.32*   < > 3.52*   ANION GAP mmol/L 7   < > 8   CALCIUM mg/dL 8.7   < > 8.7   ALBUMIN g/dL   --   --  3.0*   TOTAL BILIRUBIN mg/dL  --   --  0.43   ALK PHOS U/L  --   --  69   ALT U/L  --   --  9   AST U/L  --   --  21   GLUCOSE RANDOM mg/dL 129   < > 152*    < > = values in this interval not displayed.     Results from last 7 days   Lab Units 10/17/24  0459   INR  1.13     Results from last 7 days   Lab Units 10/22/24  1119 10/22/24  0827 10/21/24  2205 10/21/24  1650 10/21/24  1119 10/21/24  0719 10/20/24  2119 10/20/24  1547 10/20/24  1149 10/20/24  0745 10/19/24  2125 10/19/24  1535   POC GLUCOSE mg/dl 114 82 135 136 134 103 129 137 135 114 157* 154*     Results from last 7 days   Lab Units 10/16/24  0504   HEMOGLOBIN A1C % 8.6*     Results from last 7 days   Lab Units 10/20/24  0440 10/19/24  0447 10/18/24  0644 10/17/24  1522 10/17/24  0459   LACTIC ACID mmol/L  --   --   --  0.8  --    PROCALCITONIN ng/ml 0.26* 0.29* 0.29*  --  0.20       Recent Cultures (last 7 days):   Results from last 7 days   Lab Units 10/17/24  1522 10/17/24  1027   BLOOD CULTURE  No Growth After 4 Days. Actinomyces odontolyticus*   GRAM STAIN RESULT   --  Gram positive rods*       Head CT      Last 24 Hours Medication List:     Current Facility-Administered Medications:     acetaminophen (TYLENOL) tablet 650 mg, Q6H PRN    aspirin (ECOTRIN LOW STRENGTH) EC tablet 81 mg, Daily    atorvastatin (LIPITOR) tablet 40 mg, QPM    buPROPion (WELLBUTRIN) tablet 100 mg, BID    chlorhexidine (PERIDEX) 0.12 % oral rinse 15 mL, Q12H MIGEL    clopidogrel (PLAVIX) tablet 75 mg, Daily    heparin (porcine) subcutaneous injection 7,500 Units, Q8H MIGEL    hydrALAZINE (APRESOLINE) injection 10 mg, Q6H PRN    insulin lispro (HumALOG/ADMELOG) 100 units/mL subcutaneous injection 2-12 Units, TID AC **AND** Fingerstick Glucose (POCT), TID AC    labetalol (NORMODYNE) tablet 300 mg, BID    NIFEdipine (PROCARDIA XL) 24 hr tablet 90 mg, Daily    OLANZapine (ZyPREXA) tablet 10 mg, HS    pantoprazole (PROTONIX) EC tablet 40 mg, Early Morning    polyethylene  glycol (MIRALAX) packet 17 g, Daily PRN    torsemide (DEMADEX) tablet 20 mg, Daily    traZODone (DESYREL) tablet 50 mg, HS PRN    Valbenazine Tosylate CAPS 40 mg, HS    Administrative Statements   Today, Patient Was Seen By: Martine Lay PA-C      **Please Note: This note may have been constructed using a voice recognition system.**

## 2024-10-22 NOTE — ASSESSMENT & PLAN NOTE
UA negative for infection  Chest x-ray: No acute cardiopulmonary disease  Lactic acid within normal limits  CT chest/abdomen/pelvis: No evidence of infectious process  Currently being monitored/observed off antibiotic

## 2024-10-22 NOTE — PROGRESS NOTES
Progress Note - Neurology   Name: Tommie Taylor 59 y.o. male I MRN: [id number]  Unit/Bed#: S -01 I Date of Admission: 10/14/2024   Date of Service: 10/22/2024 I Hospital Day: 7     Assessment & Plan  CVA (cerebral vascular accident) (HCC)  Tommie Taylor is a 59 y.o. male with history of HTN, HLD, DM type II who presented to [facility] on 10/14/2024 as a stroke alert at 2242. LKN 2200. He was in his normal state of health going to the bathroom when he suddenly experienced dizziness and collapsed. Also reported mild blurry vision and headache. While trying to get up, he noted being unable to move his RUE/RLE. NIHSS 8 for RUE/RLE weakness, R facial weakness, and extinction. Initial . CTH/CTA H/N negative for acute intracranial abnormalities, LVO, or severe vascular stenosis. TNK administration delayed to tx HTN. TNK given on 10/14 at 2340.    Following TNK administration, pt noted to have worsening dysarthria and headache. Repeat CTH showed no acute intracranial pathology including no acute hemorrhage.  CTH 24 H s/p IV TNK unremarkable for acute pathology.  MRI brain demonstrated acute Left corona radiata infarct.  Recommendations were made to continue ASA 81 mg daily and Plavix 75 mg daily x 21 days, then transition to ASA monotherapy as patient was not on AP/AC therapy at baseline.    Workup:  - CT head: Unremarkable for acute intracranial abnormalities  - CTA head and neck: Unremarkable for large vessel occlusion or critical stenosis  - Repeat CT head for headache/slurred speech following IV TNK: Unremarkable for acute pathology  - Repeat CT head 24H s/p IV TNK: Unremarkable for acute pathology  - MRI brain 10/16: Acute left corona radiata infarct noted  - Repeat CT head for lethargy/AMS 10/17: Unremarkable for acute intracranial abnormalities  - Repeat CT head for confusion 10/22: Unremarkable for acute intracranial abnormalities    - Echo: EF 70%, wall motion normal; bilateral atrium size  WNL  - Lipid panel: Total cholesterol 154, triglycerides 120, HDL 40, LDL 90  - Hemoglobin A1c: Elevated 8.6    Neurology asked to reevaluate the patient today 10/22/2024 for confusion.  Repeat CT head this AM unremarkable for acute pathology.  See plan under encephalopathy problem list.     Plan:  Will need outpatient cardiac monitoring (Zio patch or holter monitor) - will need cardiology referral  Continue ASA 81 mg daily and Plavix 75 mg daily x 21 days, then transition to ASA monotherapy  Continue Atorvastatin 40mg daily  Goal normotension, euglycemia, normothermia  Monitor on telemetry for cardiac arrhythmia  PT/OT/Speech  Rest of care per primary team appreciated  Will need outpatient follow up with neurovascular attending in 6-8 weeks (60 min visit). No further outpatient neurodiagnostic testing needed at this time.  Encephalopathy  Neurology asked to reevaluate the patient on 10/22/2024 for confusion, reportedly found naked in bed this AM.      Of note, patient was noted to be febrile on 10/17 with a Tmax of 101.3 °F and was also tachycardic.  UA and CXR unremarkable.  Blood cultures 1/2 positive for gram-positive rods with identification panel inconclusive, concerning for contaminant.  CT CAP 10/19 unremarkable.  Appears to have received 3 days of IV cefepime (10/18-10/20), antibiotics discontinued as patient had been afebrile for 24 hours with no clear source of infection.    Repeat CT head this morning 10/22, unremarkable for acute intracranial abnormalities.     Etiology of encephalopathy unclear. Cannot rule out seizures in the setting of acute infarct, however will obtain routine EEG given reported acuity of encephalopathy. Patient found to have documented cognitive impairment at baseline from April 2024.  At that time patient was noted to be occasionally disoriented, misplacing objects, and forgetting if he had eaten or taken his meds.  MoCA was performed in the office setting, patient scoring 18/30  indicating significant cognitive deficits.  Other considerations include delirium in the inpatient hospital setting. Will recommend continuing toxic metabolic workup as well per primary team.     Plan:  Hold off on any further neuroimaging at this time  Routine EEG pending   Would not recommend initiating AEDs at this time  Delirium precautions  Correction of infectious/metabolic derangements per primary team  Medical management supportive care per primary team, notify with changes    Neurology Follow Up:  To be determined     Subjective   Patient states he did feel confused this morning, unable to recall the events. He admits to feeling tired and sleepy today. He continues to experience right hand weakness from his recent stroke. He states he needs help standing and walking due to the stroke deficits. Patient denies any uncontrollable shaking activity that he's noticed in his extremities.      Objective :  Temp:  [97.4 °F (36.3 °C)-98.2 °F (36.8 °C)] 98.1 °F (36.7 °C)  HR:  [69-85] 85  BP: (150-188)/(88-99) 173/99  Resp:  [18] 18  SpO2:  [96 %-99 %] 98 %    Physical Exam  Constitutional:       General: He is not in acute distress.     Appearance: Normal appearance. He is obese. He is not ill-appearing, toxic-appearing or diaphoretic.   HENT:      Head: Normocephalic and atraumatic.   Eyes:      General: No scleral icterus.        Right eye: No discharge.         Left eye: No discharge.      Conjunctiva/sclera: Conjunctivae normal.   Skin:     General: Skin is warm and dry.   Neurological:      Mental Status: He is alert.   Psychiatric:         Mood and Affect: Mood normal.         Behavior: Behavior normal.     Neurological Exam  Mental Status  Alert.  Patient is alert, sitting up in bed   Able to state his first and last name  Unable to state where he lives  States he is at [facility]   Able to state the part of the day  Able to read the clock on the wall   Able to state the current month and year    Moderate to severe dysarthria at times  Able to follow cental and appendicular commands   Difficulty with following multistep commands  Answers questions appropriately. .    Cranial Nerves  Primary gaze midline, conjugate gaze noted  EOMs intact, no evidence of nystagmus   No visual field cut noted   Right facial weakness  Tongue deviates to the right   Mild macroglossia noted.    Motor    Flaccid RUE weakness  LUE strength full     Bilateral hip flexion 5/5  Bilateral dorsiflexion and plantar flexion 5/5.    Sensory  Light touch intact throughout     No evidence of extinction with bilateral simultaneous stimulation .    Reflexes  No involuntary movements or rhythmic seizure-like activity noted throughout exam.    Coordination    Unable to assess coordination in RUE due to weakness .    Gait    Gait testing deferred due to fall risk .      Lab Results: I have personally reviewed pertinent reports.  Recent Results (from the past 24 hour(s))   Fingerstick Glucose (POCT)    Collection Time: 10/21/24 11:19 AM   Result Value Ref Range    POC Glucose 134 65 - 140 mg/dl   Fingerstick Glucose (POCT)    Collection Time: 10/21/24  4:50 PM   Result Value Ref Range    POC Glucose 136 65 - 140 mg/dl   Fingerstick Glucose (POCT)    Collection Time: 10/21/24 10:05 PM   Result Value Ref Range    POC Glucose 135 65 - 140 mg/dl   Fingerstick Glucose (POCT)    Collection Time: 10/22/24  8:27 AM   Result Value Ref Range    POC Glucose 82 65 - 140 mg/dl     Imaging Studies: I have personally reviewed pertinent reports and I have personally reviewed pertinent films in PACS.    EKG, Pathology, and Other Studies: I have personally reviewed pertinent reports.    VTE Pharmacologic Prophylaxis: Heparin    Dictation voice to text software has been used in the creation of this document.  Please consider this in light of any contextual or grammatical errors.

## 2024-10-22 NOTE — PLAN OF CARE
Problem: NEUROSENSORY - ADULT  Goal: Achieves stable or improved neurological status  Description: INTERVENTIONS  - Monitor and report changes in neurological status  - Monitor vital signs such as temperature, blood pressure, glucose, and any other labs ordered   - Initiate measures to prevent increased intracranial pressure  - Monitor for seizure activity and implement precautions if appropriate      Outcome: Progressing  Goal: Remains free of injury related to seizures activity  Description: INTERVENTIONS  - Maintain airway, patient safety  and administer oxygen as ordered  - Monitor patient for seizure activity, document and report duration and description of seizure to physician/advanced practitioner  - If seizure occurs,  ensure patient safety during seizure  - Reorient patient post seizure  - Seizure pads on all 4 side rails  - Instruct patient/family to notify RN of any seizure activity including if an aura is experienced  - Instruct patient/family to call for assistance with activity based on nursing assessment  - Administer anti-seizure medications if ordered    Outcome: Progressing  Goal: Achieves maximal functionality and self care  Description: INTERVENTIONS  - Monitor swallowing and airway patency with patient fatigue and changes in neurological status  - Encourage and assist patient to increase activity and self care.   - Encourage visually impaired, hearing impaired and aphasic patients to use assistive/communication devices  Outcome: Progressing     Problem: RESPIRATORY - ADULT  Goal: Achieves optimal ventilation and oxygenation  Description: INTERVENTIONS:  - Assess for changes in respiratory status  - Assess for changes in mentation and behavior  - Position to facilitate oxygenation and minimize respiratory effort  - Oxygen administered by appropriate delivery if ordered  - Initiate smoking cessation education as indicated  - Encourage broncho-pulmonary hygiene including cough, deep breathe,  Incentive Spirometry  - Assess the need for suctioning and aspirate as needed  - Assess and instruct to report SOB or any respiratory difficulty  - Respiratory Therapy support as indicated  Outcome: Progressing     Problem: MUSCULOSKELETAL - ADULT  Goal: Maintain or return mobility to safest level of function  Description: INTERVENTIONS:  - Assess patient's ability to carry out ADLs; assess patient's baseline for ADL function and identify physical deficits which impact ability to perform ADLs (bathing, care of mouth/teeth, toileting, grooming, dressing, etc.)  - Assess/evaluate cause of self-care deficits   - Assess range of motion  - Assess patient's mobility  - Assess patient's need for assistive devices and provide as appropriate  - Encourage maximum independence but intervene and supervise when necessary  - Involve family in performance of ADLs  - Assess for home care needs following discharge   - Consider OT consult to assist with ADL evaluation and planning for discharge  - Provide patient education as appropriate  Outcome: Progressing  Goal: Maintain proper alignment of affected body part  Description: INTERVENTIONS:  - Support, maintain and protect limb and body alignment  - Provide patient/ family with appropriate education  Outcome: Progressing     Problem: Neurological Deficit  Goal: Neurological status is stable or improving  Description: Interventions:  - Monitor and assess patient's level of consciousness, motor function, sensory function, and level of assistance needed for ADLs.   - Monitor and report changes from baseline. Collaborate with interdisciplinary team to initiate plan and implement interventions as ordered.   - Provide and maintain a safe environment.  - Consider seizure precautions.  - Consider fall precautions.  - Consider aspiration precautions.  - Consider bleeding precautions.  Outcome: Progressing     Problem: Activity Intolerance/Impaired Mobility  Goal: Mobility/activity is  maintained at optimum level for patient  Description: Interventions:  - Assess and monitor patient  barriers to mobility and need for assistive/adaptive devices.  - Assess patient's emotional response to limitations.  - Collaborate with interdisciplinary team and initiate plans and interventions as ordered.  - Encourage independent activity per ability.  - Maintain proper body alignment.  - Perform active/passive rom as tolerated/ordered.  - Plan activities to conserve energy.  - Turn patient as appropriate  Outcome: Progressing     Problem: Communication Impairment  Goal: Ability to express needs and understand communication  Description: Assess patient's communication skills and ability to understand information.  Patient will demonstrate use of effective communication techniques, alternative methods of communication and understanding even if not able to speak.     - Encourage communication and provide alternate methods of communication as needed.  - Collaborate with case management/ for discharge needs.  - Include patient/family/caregiver in decisions related to communication.  Outcome: Progressing     Problem: Potential for Aspiration  Goal: Non-ventilated patient's risk of aspiration is minimized  Description: Assess and monitor vital signs, respiratory status, and labs (WBC).  Monitor for signs of aspiration (tachypnea, cough, rales, wheezing, cyanosis, fever).    - Assess and monitor patient's ability to swallow.  - Place patient up in chair to eat if possible.  - HOB up at 90 degrees to eat if unable to get patient up into chair.  - Supervise patient during oral intake.   - Instruct patient/ family to take small bites.  - Instruct patient/ family to take small single sips when taking liquids.  - Follow patient-specific strategies generated by speech pathologist.  Outcome: Progressing  Goal: Ventilated patient's risk of aspiration is minimized  Description: Assess and monitor vital signs,  respiratory status, airway cuff pressure, and labs (WBC).  Monitor for signs of aspiration (tachypnea, cough, rales, wheezing, cyanosis, fever).    - Elevate head of bed 30 degrees if patient has tube feeding.  - Monitor tube feeding.  Outcome: Progressing     Problem: Nutrition  Goal: Nutrition/Hydration status is improving  Description: Monitor and assess patient's nutrition/hydration status for malnutrition (ex- brittle hair, bruises, dry skin, pale skin and conjunctiva, muscle wasting, smooth red tongue, and disorientation). Collaborate with interdisciplinary team and initiate plan and interventions as ordered.  Monitor patient's weight and dietary intake as ordered or per policy. Utilize nutrition screening tool and intervene per policy. Determine patient's food preferences and provide high-protein, high-caloric foods as appropriate.     - Assist patient with eating.  - Allow adequate time for meals.  - Encourage patient to take dietary supplement as ordered.  - Collaborate with clinical nutritionist.  - Include patient/family/caregiver in decisions related to nutrition.  Outcome: Progressing

## 2024-10-22 NOTE — ASSESSMENT & PLAN NOTE
Tommie Taylor is a 59 y.o. male with history of HTN, HLD, DM type II who presented to [facility] ED on 10/14/2024 as a stroke alert at 2242. LKN 2200. He was in his normal state of health going to the bathroom when he suddenly experienced dizziness and collapsed. Also reported mild blurry vision and headache. While trying to get up, he noted being unable to move his RUE/RLE. NIHSS 8 for RUE/RLE weakness, R facial weakness, and extinction. Initial . CTH/CTA H/N negative for acute intracranial abnormalities, LVO, or severe vascular stenosis. TNK administration delayed to tx HTN. TNK given on 10/14 at 2340.    Following TNK administration, pt noted to have worsening dysarthria and headache. Repeat CTH showed no acute intracranial pathology including no acute hemorrhage.  CTH 24 H s/p IV TNK unremarkable for acute pathology.  MRI brain demonstrated acute Left corona radiata infarct.  Recommendations were made to continue ASA 81 mg daily and Plavix 75 mg daily x 21 days, then transition to ASA monotherapy as patient was not on AP/AC therapy at baseline.    Workup:  - CT head: Unremarkable for acute intracranial abnormalities  - CTA head and neck: Unremarkable for large vessel occlusion or critical stenosis  - Repeat CT head for headache/slurred speech following IV TNK: Unremarkable for acute pathology  - Repeat CT head 24H s/p IV TNK: Unremarkable for acute pathology  - MRI brain 10/16: Acute left corona radiata infarct noted  - Repeat CT head for lethargy/AMS 10/17: Unremarkable for acute intracranial abnormalities  - Repeat CT head for confusion 10/22: Unremarkable for acute intracranial abnormalities    - Echo: EF 70%, wall motion normal; bilateral atrium size WNL  - Lipid panel: Total cholesterol 154, triglycerides 120, HDL 40, LDL 90  - Hemoglobin A1c: Elevated 8.6    Neurology asked to reevaluate the patient today 10/22/2024 for confusion.  Repeat CT head this AM unremarkable for acute pathology.  See plan  under encephalopathy problem list.     Plan:  Will need outpatient cardiac monitoring (Zio patch or holter monitor) - will need cardiology referral  Continue ASA 81 mg daily and Plavix 75 mg daily x 21 days, then transition to ASA monotherapy  Continue Atorvastatin 40mg daily  Goal normotension, euglycemia, normothermia  Monitor on telemetry for cardiac arrhythmia  PT/OT/Speech  Rest of care per primary team appreciated  Will need outpatient follow up with neurovascular attending in 6-8 weeks (60 min visit). No further outpatient neurodiagnostic testing needed at this time.

## 2024-10-22 NOTE — ASSESSMENT & PLAN NOTE
Etiology suggested to be secondary to ATN versus TMA in the setting of uncontrolled blood pressure and complicated by contrast associated nephropathy   Baseline creatinine around 2.9  Current creatinine 4.32.  Seems like it is starting to plateau and has remained in the mid 4 range for several days  UA with microhematuria, proteinuria  US kidney and bladder --bladder wall thickening but no hydronephrosis  Continue to monitor intermittent bladder scans to ensure no retention  No indication of dialysis at this time per nephrology  Avoid nephrotoxic agents  Monitor I&Os  Daily BMP  Nephrology following, input is appreciated.

## 2024-10-22 NOTE — ASSESSMENT & PLAN NOTE
Patient with history of morbid obesity, hypertension and uncontrolled diabetes who presented with right upper and right lower extremity weakness with associated right facial droop, highly suggestive of CVA  CT/CTA head unremarkable for acute process  TnK administered following correction of hypertension with nicardipine drip in the ICU  Following thrombolytic administration patient developed worsening slurred speech and headache, had repeat CT head which was unremarkable  Repeat head CT at 24 hours post TNK negative for signs of bleed  Neurology consult  MRI significant for left corona radiata stroke.  Placed on DAPT therapy for 21 days and then continue aspirin monotherapy indefinitely per neurology.  Started on Lipitor 40 mg per stroke protocol  Recommend Zio patch outpatient to evaluate for any possible arrhythmia  Will need f/u with neurovascular team in 6-8 weeks   PT/OT recommending level 1--pending rehab

## 2024-10-22 NOTE — ASSESSMENT & PLAN NOTE
Hypertensive emergency on admission in the setting of likely CVA  Evidenced by bp 197/104->231/105 POA  S/p Cardizem drip 10/15  PTA medications: Norvasc 10 mg and labetalol 100 mg twice daily.   Appreciate nephro assistance in BP management   Discontinued Norvasc  Increased nifedipine 90mg daily  10/22 Increased labetolol to 300mg BID  Demadex 20 mg daily  BPs were initially improving but noted to be elevated this morning.

## 2024-10-22 NOTE — ASSESSMENT & PLAN NOTE
Plan:   Medical management per primary team.  Medication recommendations:  Continue current psychiatric medication regimen, including Wellbutrin 100 mg 2 times daily, Zyprexa 10 mg daily at bedtime, and valbenazine 40 mg daily at bedtime.  Continue as needed trazodone 50 mg at bedtime for insomnia.  If patient appears to be in an activated/agitated state, can hold Wellbutrin.  Consultation appreciated. Psychiatry to sign off. Please do not hesitate to call/contact our service with any additional comments/concerns. Please call/contact on-call Psychiatry via [employer] including on-call psychiatric service via [employer] ([phone]) with any comments/concerns if after hours/Fri/Sat/Sunday.Thank you!

## 2024-10-22 NOTE — ASSESSMENT & PLAN NOTE
History of Bipolar disorder, follows with psych--will request inpatient evaluation and assessment of medications given increased confusion in the past 24 hours  Continue Zyprexa, buproprion, trazodone

## 2024-10-23 LAB
ANION GAP SERPL CALCULATED.3IONS-SCNC: 9 MMOL/L (ref 4–13)
BUN SERPL-MCNC: 43 MG/DL (ref 5–25)
CALCIUM SERPL-MCNC: 9.1 MG/DL (ref 8.4–10.2)
CHLORIDE SERPL-SCNC: 112 MMOL/L (ref 96–108)
CO2 SERPL-SCNC: 23 MMOL/L (ref 21–32)
CREAT SERPL-MCNC: 4.49 MG/DL (ref 0.6–1.3)
GFR SERPL CREATININE-BSD FRML MDRD: 13 ML/MIN/1.73SQ M
GLUCOSE SERPL-MCNC: 134 MG/DL (ref 65–140)
GLUCOSE SERPL-MCNC: 219 MG/DL (ref 65–140)
GLUCOSE SERPL-MCNC: 76 MG/DL (ref 65–140)
GLUCOSE SERPL-MCNC: 82 MG/DL (ref 65–140)
GLUCOSE SERPL-MCNC: 96 MG/DL (ref 65–140)
POTASSIUM SERPL-SCNC: 4.4 MMOL/L (ref 3.5–5.3)
SODIUM SERPL-SCNC: 144 MMOL/L (ref 135–147)

## 2024-10-23 PROCEDURE — 99233 SBSQ HOSP IP/OBS HIGH 50: CPT | Performed by: PSYCHIATRY & NEUROLOGY

## 2024-10-23 PROCEDURE — 92526 ORAL FUNCTION THERAPY: CPT

## 2024-10-23 PROCEDURE — 80048 BASIC METABOLIC PNL TOTAL CA: CPT | Performed by: PHYSICIAN ASSISTANT

## 2024-10-23 PROCEDURE — 94760 N-INVAS EAR/PLS OXIMETRY 1: CPT

## 2024-10-23 PROCEDURE — 95816 EEG AWAKE AND DROWSY: CPT | Performed by: STUDENT IN AN ORGANIZED HEALTH CARE EDUCATION/TRAINING PROGRAM

## 2024-10-23 PROCEDURE — 82948 REAGENT STRIP/BLOOD GLUCOSE: CPT

## 2024-10-23 PROCEDURE — 99232 SBSQ HOSP IP/OBS MODERATE 35: CPT | Performed by: INTERNAL MEDICINE

## 2024-10-23 PROCEDURE — 99232 SBSQ HOSP IP/OBS MODERATE 35: CPT | Performed by: PHYSICIAN ASSISTANT

## 2024-10-23 RX ORDER — SODIUM CHLORIDE, SODIUM GLUCONATE, SODIUM ACETATE, POTASSIUM CHLORIDE, MAGNESIUM CHLORIDE, SODIUM PHOSPHATE, DIBASIC, AND POTASSIUM PHOSPHATE .53; .5; .37; .037; .03; .012; .00082 G/100ML; G/100ML; G/100ML; G/100ML; G/100ML; G/100ML; G/100ML
75 INJECTION, SOLUTION INTRAVENOUS CONTINUOUS
Status: DISPENSED | OUTPATIENT
Start: 2024-10-23 | End: 2024-10-23

## 2024-10-23 RX ADMIN — HEPARIN SODIUM 7500 UNITS: 5000 INJECTION INTRAVENOUS; SUBCUTANEOUS at 04:59

## 2024-10-23 RX ADMIN — CHLORHEXIDINE GLUCONATE 15 ML: 1.2 RINSE ORAL at 21:21

## 2024-10-23 RX ADMIN — NIFEDIPINE 90 MG: 30 TABLET, FILM COATED, EXTENDED RELEASE ORAL at 10:04

## 2024-10-23 RX ADMIN — VALBENAZINE 40 MG: 40 CAPSULE ORAL at 21:21

## 2024-10-23 RX ADMIN — CLOPIDOGREL BISULFATE 75 MG: 75 TABLET ORAL at 10:04

## 2024-10-23 RX ADMIN — ATORVASTATIN CALCIUM 40 MG: 40 TABLET, FILM COATED ORAL at 17:27

## 2024-10-23 RX ADMIN — HEPARIN SODIUM 7500 UNITS: 5000 INJECTION INTRAVENOUS; SUBCUTANEOUS at 17:27

## 2024-10-23 RX ADMIN — SODIUM CHLORIDE, SODIUM GLUCONATE, SODIUM ACETATE, POTASSIUM CHLORIDE, MAGNESIUM CHLORIDE, SODIUM PHOSPHATE, DIBASIC, AND POTASSIUM PHOSPHATE 75 ML/HR: .53; .5; .37; .037; .03; .012; .00082 INJECTION, SOLUTION INTRAVENOUS at 13:17

## 2024-10-23 RX ADMIN — BUPROPION HYDROCHLORIDE TABLETS 100 MG: 100 TABLET, FILM COATED ORAL at 10:04

## 2024-10-23 RX ADMIN — PANTOPRAZOLE SODIUM 40 MG: 40 TABLET, DELAYED RELEASE ORAL at 05:00

## 2024-10-23 RX ADMIN — BUPROPION HYDROCHLORIDE TABLETS 100 MG: 100 TABLET, FILM COATED ORAL at 17:27

## 2024-10-23 RX ADMIN — LABETALOL HYDROCHLORIDE 300 MG: 100 TABLET, FILM COATED ORAL at 10:04

## 2024-10-23 RX ADMIN — CHLORHEXIDINE GLUCONATE 15 ML: 1.2 RINSE ORAL at 10:03

## 2024-10-23 RX ADMIN — INSULIN LISPRO 4 UNITS: 100 INJECTION, SOLUTION INTRAVENOUS; SUBCUTANEOUS at 17:28

## 2024-10-23 RX ADMIN — ASPIRIN 81 MG: 81 TABLET, COATED ORAL at 10:04

## 2024-10-23 RX ADMIN — LABETALOL HYDROCHLORIDE 300 MG: 100 TABLET, FILM COATED ORAL at 17:27

## 2024-10-23 NOTE — ASSESSMENT & PLAN NOTE
Neurology asked to reevaluate the patient on 10/22/2024 for confusion, reportedly found naked in bed this AM.      Of note, patient was noted to be febrile on 10/17 with a Tmax of 101.3 °F and was also tachycardic.  UA and CXR unremarkable.  Blood cultures 1/2 positive for gram-positive rods with identification panel inconclusive, concerning for contaminant.  CT CAP 10/19 unremarkable.  Appears to have received 3 days of IV cefepime (10/18-10/20), antibiotics discontinued as patient had been afebrile for 24 hours with no clear source of infection.    Repeat CT head on 10/22, unremarkable for acute intracranial abnormalities. Routine EEG on 10/22 was unremarkable for electrographic seizures, interictal epileptiform discharges, or focal slowing clinical events captured    Etiology of encephalopathy unclear. Repeat CTH and Routine EEG unremarkable. Patient found to have documented cognitive impairment at baseline from April 2024.  At that time patient was noted to be occasionally disoriented, misplacing objects, and forgetting if he had eaten or taken his meds.  MoCA was performed in the office setting, patient scoring 18/30 indicating significant cognitive deficits.  Other considerations include delirium in the inpatient hospital setting. Will also recommend continuing toxic metabolic workup as well per primary team.     Plan:  Hold off on any further neuroimaging at this time  Would not recommend initiating AEDs at this time  Delirium precautions  Correction of infectious/metabolic derangements per primary team  Medical management supportive care per primary team, notify with changes

## 2024-10-23 NOTE — ARC ADMISSION
Reviewed updates with [facility] physician - patient remains acute rehab appropriate pending medical stability, functional progress/tolerance and bed availability. However, [facility] physician requesting to continue to monitor patient for additional 24 hours given new confusion yesterday morning. CM has been updated. Will continue to follow patient's case at this time.

## 2024-10-23 NOTE — ASSESSMENT & PLAN NOTE
Etiology: ATN from contrast associated nephropathy (10/14) versus TMA in setting of uncontrolled hypertension  Baseline creatinine around 2.9  Creatinine on admission 3.24 on 10/14  Peak creatinine 4.57 10/21  Creatinine yesterday 4.32  Creatinine today 4.49  UA: Microhematuria (RBC 30-50), proteinuria  UPCR: 8.4 g prior to admission  Kidney ultrasound: No hydronephrosis  No hypocomplementemia  PLA2R negative  SPEP showing abnormal distribution in gamma region, previous immunofixation showed no monoclonal immunoglobulin  Continue to monitor for renal recovery  Hold torsemide today  Give Isolyte 75 cc/h for 10 hours today  Trend BMP daily  No indication for renal replacement therapy

## 2024-10-23 NOTE — ASSESSMENT & PLAN NOTE
Lab Results   Component Value Date    HGBA1C 8.6 (H) 10/16/2024     Recent Labs     10/22/24  0827 10/22/24  1119 10/22/24  2209 10/23/24  0746   POCGLU 82 114 93 76   Blood Sugar Average: Last 72 hrs:  (P) [id number]  A1c reflects poor control but blood sugars have been stable here on sliding scale alone  Currently maintained on semaglutide monotherapy outpatient- hold on admission   SSI with accu checks  Hypoglycemia protocol  Diabetic diet

## 2024-10-23 NOTE — PHYSICAL THERAPY NOTE
Physical Therapy Cancellation Note       10/23/24 1008   Note Type   Note Type Cancelled Session   Cancel Reasons Other   Assessment   Assessment attempted to see pt for PT intervention. Makayla DARÍO was present at bedside and stated pt has been very lethargic this morning. attempted to converse w/ pt but he is unable to maintain conversation and active participation. PT session cancelled due to pt's current status. will follow and continue PT as medically appropriate and schedule allows.     Brannon Amaral, PT

## 2024-10-23 NOTE — ASSESSMENT & PLAN NOTE
Noted to be more lethargic on exam 10/17--then improved  Am of 10/22/2024 patient confused, found naked in bed.  Appreciate neurology reassessment--appeared stable later in the day.  Of note he does have a history of cognitive impairment with prior MoCA 18 out of 30  Repeat head CT was stable  Rechecked B12 (low in past)--now normal  Appreciate psych input, no medication adjustment was required  EEG done --pending results

## 2024-10-23 NOTE — ASSESSMENT & PLAN NOTE
Status post TNK administration  Currently on dual antiplatelet therapy with aspirin and Plavix and atorvastatin  Management per primary team and neurology

## 2024-10-23 NOTE — ASSESSMENT & PLAN NOTE
Tommie Taylor is a 59 y.o. male with history of HTN, HLD, DM type II who presented to [facility] ED on 10/14/2024 as a stroke alert at 2242. LKN 2200. He was in his normal state of health going to the bathroom when he suddenly experienced dizziness and collapsed. Also reported mild blurry vision and headache. While trying to get up, he noted being unable to move his RUE/RLE. NIHSS 8 for RUE/RLE weakness, R facial weakness, and extinction. Initial . CTH/CTA H/N negative for acute intracranial abnormalities, LVO, or severe vascular stenosis. TNK administration delayed to tx HTN. TNK given on 10/14 at 2340.    Following TNK administration, pt noted to have worsening dysarthria and headache. Repeat CTH showed no acute intracranial pathology including no acute hemorrhage.  CTH 24 H s/p IV TNK unremarkable for acute pathology.  MRI brain demonstrated acute Left corona radiata infarct.  Recommendations were made to continue ASA 81 mg daily and Plavix 75 mg daily x 21 days, then transition to ASA monotherapy as patient was not on AP/AC therapy at baseline.    Workup:  - CT head: Unremarkable for acute intracranial abnormalities  - CTA head and neck: Unremarkable for large vessel occlusion or critical stenosis  - Repeat CT head for headache/slurred speech following IV TNK: Unremarkable for acute pathology  - Repeat CT head 24H s/p IV TNK: Unremarkable for acute pathology  - MRI brain 10/16: Acute left corona radiata infarct noted  - Repeat CT head for lethargy/AMS 10/17: Unremarkable for acute intracranial abnormalities  - Repeat CT head for confusion 10/22: Unremarkable for acute intracranial abnormalities    - Echo: EF 70%, wall motion normal; bilateral atrium size WNL  - Lipid panel: Total cholesterol 154, triglycerides 120, HDL 40, LDL 90  - Hemoglobin A1c: Elevated 8.6    Neurology asked to reevaluate the patient on 10/22/2024 for confusion.  Repeat CT head on 10/22 unremarkable for acute pathology.  See plan  under encephalopathy problem list.     Plan:  Will need outpatient cardiac monitoring (Zio patch or holter monitor) - will need cardiology referral  Continue ASA 81 mg daily and Plavix 75 mg daily x 21 days, then transition to ASA monotherapy  Continue Atorvastatin 40mg daily  Goal normotension, euglycemia, normothermia  Monitor on telemetry for cardiac arrhythmia  PT/OT/Speech  Rest of care per primary team appreciated  Will need outpatient follow up with neurovascular attending in 6-8 weeks (60 min visit). No further outpatient neurodiagnostic testing needed at this time.

## 2024-10-23 NOTE — TELEPHONE ENCOUNTER
STILL ADMITTED:10/14/2024 - present (9 days)  [facility]      HFU/ SUNIL TRENT/ PEDRITOA    Marialuisa Haro PA-C  [facility]  Tommie Taylor will need follow-up in  4-6 weeks  with neurovascular team for Other in 60 minute appointment. They will require a extended cardiac monitoring within 4 weeks.

## 2024-10-23 NOTE — ASSESSMENT & PLAN NOTE
Blood pressure currently better controlled  Current Rx: Labetalol 300 mg twice daily, nifedipine 90 mg daily, torsemide 20 mg daily  Changes: Hold torsemide today and continue labetalol and nifedipine at current doses

## 2024-10-23 NOTE — PROGRESS NOTES
NEPHROLOGY HOSPITAL PROGRESS NOTE   Tommie Taylor 59 y.o. male MRN: [id number]  Unit/Bed#: S -01 Encounter: [id number]  Reason for Consult: ROSINA on CKD    Assessment & Plan  Acute kidney injury superimposed on stage 4 chronic kidney disease (HCC)  Etiology: ATN from contrast associated nephropathy (10/14) versus TMA in setting of uncontrolled hypertension  Baseline creatinine around 2.9  Creatinine on admission 3.24 on 10/14  Peak creatinine 4.57 10/21  Creatinine yesterday 4.32  Creatinine today 4.49  UA: Microhematuria (RBC 30-50), proteinuria  UPCR: 8.4 g prior to admission  Kidney ultrasound: No hydronephrosis  No hypocomplementemia  PLA2R negative  SPEP showing abnormal distribution in gamma region, previous immunofixation showed no monoclonal immunoglobulin  Continue to monitor for renal recovery  Hold torsemide today  Give Isolyte 75 cc/h for 10 hours today  Trend BMP daily  No indication for renal replacement therapy  Hypertensive emergency  Blood pressure currently better controlled  Current Rx: Labetalol 300 mg twice daily, nifedipine 90 mg daily, torsemide 20 mg daily  Changes: Hold torsemide today and continue labetalol and nifedipine at current doses  CVA (cerebral vascular accident) (HCC)  Status post TNK administration  Currently on dual antiplatelet therapy with aspirin and Plavix and atorvastatin  Management per primary team and neurology  Bipolar I disorder, severe, current or most recent episode depressed, with psychotic features (HCC)  Management per primary team  SIRS (systemic inflammatory response syndrome) (HCC)  UA negative for infection  Chest x-ray: No acute cardiopulmonary disease  Lactic acid within normal limits  CT chest/abdomen/pelvis: No evidence of infectious process  Currently being monitored/observed off antibiotic      Discussed with internal medicine team.  After discussion, we agreed that patient is not stable for discharge today and to hold diuretic and to provide  limited dose of IV fluids today.    SUBJECTIVE / 24H INTERVAL HISTORY:  Urine output recorded as 1100 cc.  Denies dyspnea.  Denies leg swelling.    OBJECTIVE:  Current Weight: Weight - Scale: 119 kg (262 lb 5.6 oz)  Vitals:    10/22/24 2329 10/23/24 0249 10/23/24 0600 10/23/24 0743   BP: (!) 179/93 155/73  143/78   Pulse: 70 69  78   Resp:       Temp: 98.3 °F (36.8 °C)   98.1 °F (36.7 °C)   TempSrc:       SpO2: 97% 100%  97%   Weight:   119 kg (262 lb 5.6 oz)    Height:           Intake/Output Summary (Last 24 hours) at 10/23/2024 1136  Last data filed at 10/23/2024 0600  Gross per 24 hour   Intake 720 ml   Output 775 ml   Net -55 ml     Review of Systems   Constitutional:  Negative for chills and fever.   HENT:  Negative for ear pain and sore throat.    Eyes:  Negative for pain and visual disturbance.   Respiratory:  Negative for cough and shortness of breath.    Cardiovascular:  Negative for chest pain and palpitations.   Gastrointestinal:  Negative for abdominal pain and vomiting.   Genitourinary:  Negative for dysuria and hematuria.   Musculoskeletal:  Negative for arthralgias and back pain.   Skin:  Negative for color change and rash.   Neurological:  Negative for seizures and syncope.   All other systems reviewed and are negative.    Physical Exam  Vitals and nursing note reviewed.   Constitutional:       General: He is not in acute distress.     Appearance: He is well-developed.   HENT:      Head: Normocephalic and atraumatic.   Eyes:      Conjunctiva/sclera: Conjunctivae normal.   Cardiovascular:      Rate and Rhythm: Normal rate and regular rhythm.      Pulses: Normal pulses.      Heart sounds: Normal heart sounds. No murmur heard.  Pulmonary:      Effort: Pulmonary effort is normal. No respiratory distress.      Breath sounds: Normal breath sounds.   Abdominal:      Palpations: Abdomen is soft.      Tenderness: There is no abdominal tenderness.   Musculoskeletal:         General: No swelling.      Cervical  back: Neck supple.      Right lower leg: No edema.      Left lower leg: No edema.   Skin:     General: Skin is warm and dry.      Capillary Refill: Capillary refill takes less than 2 seconds.   Neurological:      Mental Status: He is alert.   Psychiatric:         Mood and Affect: Mood normal.       Medications:    Current Facility-Administered Medications:     acetaminophen (TYLENOL) tablet 650 mg, 650 mg, Oral, Q6H PRN, Macey Almeida PA-C, 650 mg at 10/17/24 1422    aspirin (ECOTRIN LOW STRENGTH) EC tablet 81 mg, 81 mg, Oral, Daily, Rose Miner MD, 81 mg at 10/23/24 1004    atorvastatin (LIPITOR) tablet 40 mg, 40 mg, Oral, QPM, Rose Miner MD, 40 mg at 10/22/24 1817    buPROPion (WELLBUTRIN) tablet 100 mg, 100 mg, Oral, BID, Rose Miner MD, 100 mg at 10/23/24 1004    calcium carbonate (TUMS) chewable tablet 1,000 mg, 1,000 mg, Oral, BID PRN, Leslie S Pretty, CRNP, 1,000 mg at 10/22/24 2226    chlorhexidine (PERIDEX) 0.12 % oral rinse 15 mL, 15 mL, Mouth/Throat, Q12H MIGEL, Rose Miner MD, 15 mL at 10/23/24 1003    clopidogrel (PLAVIX) tablet 75 mg, 75 mg, Oral, Daily, Rose Miner MD, 75 mg at 10/23/24 1004    heparin (porcine) subcutaneous injection 7,500 Units, 7,500 Units, Subcutaneous, Q8H MIGEL, Rose Miner MD, 7,500 Units at 10/23/24 0459    hydrALAZINE (APRESOLINE) injection 10 mg, 10 mg, Intravenous, Q6H PRN, Rose Miner MD, 10 mg at 10/17/24 1430    insulin lispro (HumALOG/ADMELOG) 100 units/mL subcutaneous injection 2-12 Units, 2-12 Units, Subcutaneous, TID AC, 2 Units at 10/19/24 1808 **AND** Fingerstick Glucose (POCT), , , TID AC, Rose Miner MD    labetalol (NORMODYNE) tablet 300 mg, 300 mg, Oral, BID, Johny Lombardo MD, 300 mg at 10/23/24 1004    multi-electrolyte (PLASMALYTE-A/ISOLYTE-S PH 7.4) IV solution, 75 mL/hr, Intravenous, Continuous, Johny Lombardo MD    NIFEdipine (PROCARDIA XL) 24 hr tablet 90 mg, 90 mg, Oral, Daily, Joselyn Reyes Bahamonde, MD, 90 mg at 10/23/24 1004     "OLANZapine (ZyPREXA) tablet 10 mg, 10 mg, Oral, HS, Rose Miner MD, 10 mg at 10/22/24 2226    pantoprazole (PROTONIX) EC tablet 40 mg, 40 mg, Oral, Early Morning, Rose Miner MD, 40 mg at 10/23/24 0500    polyethylene glycol (MIRALAX) packet 17 g, 17 g, Oral, Daily PRN, Rose Miner MD    [Held by provider] torsemide (DEMADEX) tablet 20 mg, 20 mg, Oral, Daily, Joselyn Reyes Bahamonde, MD, 20 mg at 10/22/24 1011    traZODone (DESYREL) tablet 50 mg, 50 mg, Oral, HS PRN, Rose Miner MD, 50 mg at 10/21/24 2109    Valbenazine Tosylate CAPS 40 mg, 40 mg, Oral, HS, Rose Miner MD, 40 mg at 10/22/24 2229    Laboratory Results:  Results from last 7 days   Lab Units 10/23/24  0807 10/22/24  1020 10/21/24  0431 10/20/24  0809 10/20/24  0440 10/19/24  0447 10/18/24  0644 10/17/24  0557 10/17/24  0459   WBC Thousand/uL  --   --  7.65  --  6.86 7.54 8.28 8.35  --    HEMOGLOBIN g/dL  --   --  10.4*  --  9.3* 10.1* 10.3* 10.9*  --    HEMATOCRIT %  --   --  31.9*  --  28.7* 30.8* 31.5* 32.7*  --    PLATELETS Thousands/uL  --   --  195  --  178 206 212 194  --    POTASSIUM mmol/L 4.4 4.3 4.2 4.4  --  4.7 4.6  --  5.1   CHLORIDE mmol/L 112* 111* 110* 112*  --  113* 112*  --  112*   CO2 mmol/L 23 24 22 23  --  23 22  --  18*   BUN mg/dL 43* 41* 39* 36*  --  36* 33*  --  26*   CREATININE mg/dL 4.49* 4.32* 4.57* 4.43*  --  4.38* 4.19*  --  3.52*   CALCIUM mg/dL 9.1 8.7 8.5 8.6  --  8.5 8.8  --  8.7   MAGNESIUM mg/dL  --   --   --   --   --   --   --   --  2.1   PHOSPHORUS mg/dL  --   --   --   --   --   --   --   --  3.8       Portions of the record may have been created with voice recognition software. Occasional wrong word or \"sound a like\" substitutions may have occurred due to the inherent limitations of voice recognition software. Read the chart carefully and recognize, using context, where substitutions have occurred.If you have any questions, please contact the dictating provider.    "

## 2024-10-23 NOTE — ASSESSMENT & PLAN NOTE
Hypertensive emergency on admission in the setting of likely CVA  Evidenced by bp 197/104->231/105 POA  S/p Cardizem drip 10/15  PTA medications: Norvasc 10 mg and labetalol 100 mg twice daily.   Appreciate nephro assistance in BP management   Discontinued Norvasc  Increased nifedipine 90mg daily  10/22 Increased labetolol to 300mg BID  Demadex 20 mg daily--now on hold per nephrology  BPs are fluctuating.  Goal is normotension now per neurology

## 2024-10-23 NOTE — PLAN OF CARE
Problem: NEUROSENSORY - ADULT  Goal: Achieves stable or improved neurological status  Description: INTERVENTIONS  - Monitor and report changes in neurological status  - Monitor vital signs such as temperature, blood pressure, glucose, and any other labs ordered   - Initiate measures to prevent increased intracranial pressure  - Monitor for seizure activity and implement precautions if appropriate      Outcome: Progressing  Goal: Remains free of injury related to seizures activity  Description: INTERVENTIONS  - Maintain airway, patient safety  and administer oxygen as ordered  - Monitor patient for seizure activity, document and report duration and description of seizure to physician/advanced practitioner  - If seizure occurs,  ensure patient safety during seizure  - Reorient patient post seizure  - Seizure pads on all 4 side rails  - Instruct patient/family to notify RN of any seizure activity including if an aura is experienced  - Instruct patient/family to call for assistance with activity based on nursing assessment  - Administer anti-seizure medications if ordered    Outcome: Progressing  Goal: Achieves maximal functionality and self care  Description: INTERVENTIONS  - Monitor swallowing and airway patency with patient fatigue and changes in neurological status  - Encourage and assist patient to increase activity and self care.   - Encourage visually impaired, hearing impaired and aphasic patients to use assistive/communication devices  Outcome: Progressing     Problem: RESPIRATORY - ADULT  Goal: Achieves optimal ventilation and oxygenation  Description: INTERVENTIONS:  - Assess for changes in respiratory status  - Assess for changes in mentation and behavior  - Position to facilitate oxygenation and minimize respiratory effort  - Oxygen administered by appropriate delivery if ordered  - Initiate smoking cessation education as indicated  - Encourage broncho-pulmonary hygiene including cough, deep breathe,  Incentive Spirometry  - Assess the need for suctioning and aspirate as needed  - Assess and instruct to report SOB or any respiratory difficulty  - Respiratory Therapy support as indicated  Outcome: Progressing     Problem: MUSCULOSKELETAL - ADULT  Goal: Maintain or return mobility to safest level of function  Description: INTERVENTIONS:  - Assess patient's ability to carry out ADLs; assess patient's baseline for ADL function and identify physical deficits which impact ability to perform ADLs (bathing, care of mouth/teeth, toileting, grooming, dressing, etc.)  - Assess/evaluate cause of self-care deficits   - Assess range of motion  - Assess patient's mobility  - Assess patient's need for assistive devices and provide as appropriate  - Encourage maximum independence but intervene and supervise when necessary  - Involve family in performance of ADLs  - Assess for home care needs following discharge   - Consider OT consult to assist with ADL evaluation and planning for discharge  - Provide patient education as appropriate  Outcome: Progressing  Goal: Maintain proper alignment of affected body part  Description: INTERVENTIONS:  - Support, maintain and protect limb and body alignment  - Provide patient/ family with appropriate education  Outcome: Progressing     Problem: Neurological Deficit  Goal: Neurological status is stable or improving  Description: Interventions:  - Monitor and assess patient's level of consciousness, motor function, sensory function, and level of assistance needed for ADLs.   - Monitor and report changes from baseline. Collaborate with interdisciplinary team to initiate plan and implement interventions as ordered.   - Provide and maintain a safe environment.  - Consider seizure precautions.  - Consider fall precautions.  - Consider aspiration precautions.  - Consider bleeding precautions.  Outcome: Progressing     Problem: Activity Intolerance/Impaired Mobility  Goal: Mobility/activity is  maintained at optimum level for patient  Description: Interventions:  - Assess and monitor patient  barriers to mobility and need for assistive/adaptive devices.  - Assess patient's emotional response to limitations.  - Collaborate with interdisciplinary team and initiate plans and interventions as ordered.  - Encourage independent activity per ability.  - Maintain proper body alignment.  - Perform active/passive rom as tolerated/ordered.  - Plan activities to conserve energy.  - Turn patient as appropriate  Outcome: Progressing     Problem: Communication Impairment  Goal: Ability to express needs and understand communication  Description: Assess patient's communication skills and ability to understand information.  Patient will demonstrate use of effective communication techniques, alternative methods of communication and understanding even if not able to speak.     - Encourage communication and provide alternate methods of communication as needed.  - Collaborate with case management/ for discharge needs.  - Include patient/family/caregiver in decisions related to communication.  Outcome: Progressing     Problem: Potential for Aspiration  Goal: Non-ventilated patient's risk of aspiration is minimized  Description: Assess and monitor vital signs, respiratory status, and labs (WBC).  Monitor for signs of aspiration (tachypnea, cough, rales, wheezing, cyanosis, fever).    - Assess and monitor patient's ability to swallow.  - Place patient up in chair to eat if possible.  - HOB up at 90 degrees to eat if unable to get patient up into chair.  - Supervise patient during oral intake.   - Instruct patient/ family to take small bites.  - Instruct patient/ family to take small single sips when taking liquids.  - Follow patient-specific strategies generated by speech pathologist.  Outcome: Progressing  Goal: Ventilated patient's risk of aspiration is minimized  Description: Assess and monitor vital signs,  respiratory status, airway cuff pressure, and labs (WBC).  Monitor for signs of aspiration (tachypnea, cough, rales, wheezing, cyanosis, fever).    - Elevate head of bed 30 degrees if patient has tube feeding.  - Monitor tube feeding.  Outcome: Progressing     Problem: Nutrition  Goal: Nutrition/Hydration status is improving  Description: Monitor and assess patient's nutrition/hydration status for malnutrition (ex- brittle hair, bruises, dry skin, pale skin and conjunctiva, muscle wasting, smooth red tongue, and disorientation). Collaborate with interdisciplinary team and initiate plan and interventions as ordered.  Monitor patient's weight and dietary intake as ordered or per policy. Utilize nutrition screening tool and intervene per policy. Determine patient's food preferences and provide high-protein, high-caloric foods as appropriate.     - Assist patient with eating.  - Allow adequate time for meals.  - Encourage patient to take dietary supplement as ordered.  - Collaborate with clinical nutritionist.  - Include patient/family/caregiver in decisions related to nutrition.  Outcome: Progressing     Problem: Potential for Falls  Goal: Patient will remain free of falls  Description: INTERVENTIONS:  - Educate patient/family on patient safety including physical limitations  - Instruct patient to call for assistance with activity   - Consult OT/PT to assist with strengthening/mobility   - Keep Call bell within reach  - Keep bed low and locked with side rails adjusted as appropriate  - Keep care items and personal belongings within reach  - Initiate and maintain comfort rounds  - Make Fall Risk Sign visible to staff  - Offer Toileting every 2 Hours, in advance of need  - Initiate/Maintain alarm  - Obtain necessary fall risk management equipment:   - Apply yellow socks and bracelet for high fall risk patients  - Consider moving patient to room near nurses station  Outcome: Progressing     Problem: Knowledge  Deficit  Goal: Patient/family/caregiver demonstrates understanding of disease process, treatment plan, medications, and discharge instructions  Description: Complete learning assessment and assess knowledge base.  Interventions:  - Provide teaching at level of understanding  - Provide teaching via preferred learning methods  Outcome: Progressing

## 2024-10-23 NOTE — PROGRESS NOTES
Progress Note - Neurology   Name: Tommie Taylor 59 y.o. male I MRN: [id number]  Unit/Bed#: S -01 I Date of Admission: 10/14/2024   Date of Service: 10/23/2024 I Hospital Day: 8     Assessment & Plan  CVA (cerebral vascular accident) (HCC)  Tommie Taylor is a 59 y.o. male with history of HTN, HLD, DM type II who presented to [place] ED on 10/14/2024 as a stroke alert at 2242. LKN 2200. He was in his normal state of health going to the bathroom when he suddenly experienced dizziness and collapsed. Also reported mild blurry vision and headache. While trying to get up, he noted being unable to move his RUE/RLE. NIHSS 8 for RUE/RLE weakness, R facial weakness, and extinction. Initial . CTH/CTA H/N negative for acute intracranial abnormalities, LVO, or severe vascular stenosis. TNK administration delayed to tx HTN. TNK given on 10/14 at 2340.    Following TNK administration, pt noted to have worsening dysarthria and headache. Repeat CTH showed no acute intracranial pathology including no acute hemorrhage.  CTH 24 H s/p IV TNK unremarkable for acute pathology.  MRI brain demonstrated acute Left corona radiata infarct.  Recommendations were made to continue ASA 81 mg daily and Plavix 75 mg daily x 21 days, then transition to ASA monotherapy as patient was not on AP/AC therapy at baseline.    Workup:  - CT head: Unremarkable for acute intracranial abnormalities  - CTA head and neck: Unremarkable for large vessel occlusion or critical stenosis  - Repeat CT head for headache/slurred speech following IV TNK: Unremarkable for acute pathology  - Repeat CT head 24H s/p IV TNK: Unremarkable for acute pathology  - MRI brain 10/16: Acute left corona radiata infarct noted  - Repeat CT head for lethargy/AMS 10/17: Unremarkable for acute intracranial abnormalities  - Repeat CT head for confusion 10/22: Unremarkable for acute intracranial abnormalities    - Echo: EF 70%, wall motion normal; bilateral atrium size  WNL  - Lipid panel: Total cholesterol 154, triglycerides 120, HDL 40, LDL 90  - Hemoglobin A1c: Elevated 8.6    Neurology asked to reevaluate the patient on 10/22/2024 for confusion.  Repeat CT head on 10/22 unremarkable for acute pathology.  See plan under encephalopathy problem list.     Plan:  Will need outpatient cardiac monitoring (Zio patch or holter monitor) - will need cardiology referral  Continue ASA 81 mg daily and Plavix 75 mg daily x 21 days, then transition to ASA monotherapy  Continue Atorvastatin 40mg daily  Goal normotension, euglycemia, normothermia  Monitor on telemetry for cardiac arrhythmia  PT/OT/Speech  Rest of care per primary team appreciated  Will need outpatient follow up with neurovascular attending in 6-8 weeks (60 min visit). No further outpatient neurodiagnostic testing needed at this time.  Encephalopathy  Neurology asked to reevaluate the patient on 10/22/2024 for confusion, reportedly found naked in bed this AM.      Of note, patient was noted to be febrile on 10/17 with a Tmax of 101.3 °F and was also tachycardic.  UA and CXR unremarkable.  Blood cultures 1/2 positive for gram-positive rods with identification panel inconclusive, concerning for contaminant.  CT CAP 10/19 unremarkable.  Appears to have received 3 days of IV cefepime (10/18-10/20), antibiotics discontinued as patient had been afebrile for 24 hours with no clear source of infection.    Repeat CT head on 10/22, unremarkable for acute intracranial abnormalities. Routine EEG on 10/22 was unremarkable for electrographic seizures, interictal epileptiform discharges, or focal slowing clinical events captured    Etiology of encephalopathy unclear. Repeat CTH and Routine EEG unremarkable. Patient found to have documented cognitive impairment at baseline from April 2024.  At that time patient was noted to be occasionally disoriented, misplacing objects, and forgetting if he had eaten or taken his meds.  MoCA was performed in  the office setting, patient scoring 18/30 indicating significant cognitive deficits.  Other considerations include delirium in the inpatient hospital setting. Will also recommend continuing toxic metabolic workup as well per primary team.     Plan:  Hold off on any further neuroimaging at this time  Would not recommend initiating AEDs at this time  Delirium precautions  Correction of infectious/metabolic derangements per primary team  Medical management supportive care per primary team, notify with changes    Neurology Follow Up:  Tommie Taylor will need follow-up in  4-6 weeks  with neurovascular team for Other in 60 minute appointment. They will require a extended cardiac monitoring within 4 weeks.     Subjective   Today patient reports feeling tired and reporting he does not want to eat his breakfast.  Patient continues to have right-sided weakness, unable to state if there is any improvement.    Objective :  Temp:  [98.1 °F (36.7 °C)-98.5 °F (36.9 °C)] 98.1 °F (36.7 °C)  HR:  [68-78] 78  BP: (120-179)/(73-93) 143/78  Resp:  [17-18] 17  SpO2:  [82 %-100 %] 97 %    Physical Exam  Vitals and nursing note reviewed.   Constitutional:       General: He is not in acute distress.     Appearance: He is obese. He is not ill-appearing, toxic-appearing or diaphoretic.      Comments: Slightly somnolent throughout exam as patient was woken up from sleep   Eyes:      General: No scleral icterus.        Right eye: No discharge.         Left eye: No discharge.      Conjunctiva/sclera: Conjunctivae normal.   Skin:     General: Skin is warm and dry.   Psychiatric:         Mood and Affect: Mood normal.         Behavior: Behavior normal.       Neurological Exam  Mental Status    Patient is asleep upon entering the room, easily awakens to verbal stimuli  Oriented to person and place  Correctly states the month is October  Incorrectly states the year is 23  Able to name the president  Names all objects provided  Moderate dysarthria  noted  No evidence of aphasia  Follow central and appendicular commands throughout exam.    Cranial Nerves  Primary gaze midline, conjugate gaze noted  Horizontal EOMs intact, no evidence of nystagmus  Limited upward and downward gaze  No visual deficits noted  Facial sensation intact to light touch  Right facial weakness noted  Tongue deviates to the right.    Motor      LUE strength 5/5 throughout    No movement in right hand on command, unable to  examiner's fingers or extend fingers  Able to minimally flex and extend at right elbow, right biceps and triceps 3 -/5  Right deltoid 2/5    Left hip flexion 5/5, Right hip flexion 4/5  Bilateral dorsiflexion ROM limited, strength 4/5  Bilateral plantarflexion strength 5/5.    Sensory  Light touch is normal in upper and lower extremities.   No evidence of extinction with bilateral simultaneous stimulation.    Reflexes  No involuntary movements or rhythmic seizure-like activity noted throughout exam.    Coordination    Unable to assess coordination of RUE due to weakness.      Lab Results: I have personally reviewed pertinent reports.  Recent Results (from the past 24 hour(s))   Fingerstick Glucose (POCT)    Collection Time: 10/22/24 10:09 PM   Result Value Ref Range    POC Glucose 93 65 - 140 mg/dl   Fingerstick Glucose (POCT)    Collection Time: 10/23/24  7:46 AM   Result Value Ref Range    POC Glucose 76 65 - 140 mg/dl   Basic metabolic panel    Collection Time: 10/23/24  8:07 AM   Result Value Ref Range    Sodium 144 135 - 147 mmol/L    Potassium 4.4 3.5 - 5.3 mmol/L    Chloride 112 (H) 96 - 108 mmol/L    CO2 23 21 - 32 mmol/L    ANION GAP 9 4 - 13 mmol/L    BUN 43 (H) 5 - 25 mg/dL    Creatinine 4.49 (H) 0.60 - 1.30 mg/dL    Glucose 82 65 - 140 mg/dL    Calcium 9.1 8.4 - 10.2 mg/dL    eGFR 13 ml/min/1.73sq m   Fingerstick Glucose (POCT)    Collection Time: 10/23/24 11:28 AM   Result Value Ref Range    POC Glucose 96 65 - 140 mg/dl     Imaging Studies: I have  personally reviewed pertinent reports and I have personally reviewed pertinent films in PACS.    EKG, Pathology, and Other Studies: I have personally reviewed pertinent reports.    VTE Pharmacologic Prophylaxis: Heparin    Dictation voice to text software has been used in the creation of this document.  Please consider this in light of any contextual or grammatical errors.

## 2024-10-23 NOTE — ASSESSMENT & PLAN NOTE
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  This is likely contaminant and does not require further antibiotic treatment  Unclear source of infection, consider possibility of aspiration given new dysphagia from stroke  CT C/A/P wo contrast given ROSINA. No evidence of acute infectious process  After patient was noted to be afebrile 24 hours and no clear source of infection,  IV cefepime and vancomycin were discontinued 10/20/24.  Continue to monitor.  resolved

## 2024-10-23 NOTE — SPEECH THERAPY NOTE
Speech Language/Pathology    Speech/Language Pathology Progress Note    Patient Name: Tommie Taylor  Today's Date: 10/23/2024       Subjective:  Pt seen for dysphagia tx. Pt lethargic but arousable. Needed intermittent stim during session to maintain alertness.     Objective:  Pt assisted w/ lunch, pt spit out puree, trialed w/ PBJ sand, took sips of NTL by straw and trialed w/ thin liquids by cup and straw. Pt able to bite sand, prolonged but effective mastication/manipulation. Pocketing noted on R, pt cued for lingual sweeps to clear R sulcus, was able to follow through, mild residual noted which cleared w/ liquid wash.   Pt trialed w/ sips of thin water by cup and straw. Appeared w/ adequate oral control/transfer. Swallows timely, no overt s/s aspiration noted with thin liquids w/ po bolus/residue in oral cavity.     Assessment:  Pt appears appropriate for diet upgrade, continues w/ mild pocketing, but able to clear w/ cues.   No overt s/s aspiration noted.     Plan/Recommendations:  Rec advance to dysphagia 3 w/ nectar thick liquids for now  Ok for thin water in between meals when awake and alert  Meds as tolerated w/ NTL or applesauce  Aspiration precautions   Will cont to follow      Mya Watson MA CCC-SLP  Speech Pathologist  Available via SecureChat

## 2024-10-23 NOTE — PLAN OF CARE
Problem: Knowledge Deficit  Goal: Patient/family/caregiver demonstrates understanding of disease process, treatment plan, medications, and discharge instructions  Description: Complete learning assessment and assess knowledge base.  Interventions:  - Provide teaching at level of understanding  - Provide teaching via preferred learning methods  Outcome: Progressing     Problem: Neurological Deficit  Goal: Neurological status is stable or improving  Description: Interventions:  - Monitor and assess patient's level of consciousness, motor function, sensory function, and level of assistance needed for ADLs.   - Monitor and report changes from baseline. Collaborate with interdisciplinary team to initiate plan and implement interventions as ordered.   - Provide and maintain a safe environment.  - Consider seizure precautions.  - Consider fall precautions.  - Consider aspiration precautions.  - Consider bleeding precautions.  Outcome: Progressing     Problem: Activity Intolerance/Impaired Mobility  Goal: Mobility/activity is maintained at optimum level for patient  Description: Interventions:  - Assess and monitor patient  barriers to mobility and need for assistive/adaptive devices.  - Assess patient's emotional response to limitations.  - Collaborate with interdisciplinary team and initiate plans and interventions as ordered.  - Encourage independent activity per ability.  - Maintain proper body alignment.  - Perform active/passive rom as tolerated/ordered.  - Plan activities to conserve energy.  - Turn patient as appropriate  Outcome: Progressing     Problem: Communication Impairment  Goal: Ability to express needs and understand communication  Description: Assess patient's communication skills and ability to understand information.  Patient will demonstrate use of effective communication techniques, alternative methods of communication and understanding even if not able to speak.     - Encourage communication and  provide alternate methods of communication as needed.  - Collaborate with case management/ for discharge needs.  - Include patient/family/caregiver in decisions related to communication.  Outcome: Progressing     Problem: Nutrition  Goal: Nutrition/Hydration status is improving  Description: Monitor and assess patient's nutrition/hydration status for malnutrition (ex- brittle hair, bruises, dry skin, pale skin and conjunctiva, muscle wasting, smooth red tongue, and disorientation). Collaborate with interdisciplinary team and initiate plan and interventions as ordered.  Monitor patient's weight and dietary intake as ordered or per policy. Utilize nutrition screening tool and intervene per policy. Determine patient's food preferences and provide high-protein, high-caloric foods as appropriate.     - Assist patient with eating.  - Allow adequate time for meals.  - Encourage patient to take dietary supplement as ordered.  - Collaborate with clinical nutritionist.  - Include patient/family/caregiver in decisions related to nutrition.  Outcome: Progressing     Problem: NEUROSENSORY - ADULT  Goal: Achieves stable or improved neurological status  Description: INTERVENTIONS  - Monitor and report changes in neurological status  - Monitor vital signs such as temperature, blood pressure, glucose, and any other labs ordered   - Initiate measures to prevent increased intracranial pressure  - Monitor for seizure activity and implement precautions if appropriate      Outcome: Progressing  Goal: Remains free of injury related to seizures activity  Description: INTERVENTIONS  - Maintain airway, patient safety  and administer oxygen as ordered  - Monitor patient for seizure activity, document and report duration and description of seizure to physician/advanced practitioner  - If seizure occurs,  ensure patient safety during seizure  - Reorient patient post seizure  - Seizure pads on all 4 side rails  - Instruct  patient/family to notify RN of any seizure activity including if an aura is experienced  - Instruct patient/family to call for assistance with activity based on nursing assessment  - Administer anti-seizure medications if ordered    Outcome: Progressing  Goal: Achieves maximal functionality and self care  Description: INTERVENTIONS  - Monitor swallowing and airway patency with patient fatigue and changes in neurological status  - Encourage and assist patient to increase activity and self care.   - Encourage visually impaired, hearing impaired and aphasic patients to use assistive/communication devices  Outcome: Progressing     Problem: MUSCULOSKELETAL - ADULT  Goal: Maintain or return mobility to safest level of function  Description: INTERVENTIONS:  - Assess patient's ability to carry out ADLs; assess patient's baseline for ADL function and identify physical deficits which impact ability to perform ADLs (bathing, care of mouth/teeth, toileting, grooming, dressing, etc.)  - Assess/evaluate cause of self-care deficits   - Assess range of motion  - Assess patient's mobility  - Assess patient's need for assistive devices and provide as appropriate  - Encourage maximum independence but intervene and supervise when necessary  - Involve family in performance of ADLs  - Assess for home care needs following discharge   - Consider OT consult to assist with ADL evaluation and planning for discharge  - Provide patient education as appropriate  Outcome: Progressing  Goal: Maintain proper alignment of affected body part  Description: INTERVENTIONS:  - Support, maintain and protect limb and body alignment  - Provide patient/ family with appropriate education  Outcome: Progressing     Problem: Potential for Falls  Goal: Patient will remain free of falls  Description: INTERVENTIONS:  - Educate patient/family on patient safety including physical limitations  - Instruct patient to call for assistance with activity   - Consult OT/PT  to assist with strengthening/mobility   - Keep Call bell within reach  - Keep bed low and locked with side rails adjusted as appropriate  - Keep care items and personal belongings within reach  - Initiate and maintain comfort rounds  - Make Fall Risk Sign visible to staff  - Offer Toileting every Hours, in advance of need  - Initiate/Maintain alarm  - Obtain necessary fall risk management equipment:   - Apply yellow socks and bracelet for high fall risk patients  - Consider moving patient to room near nurses station  Outcome: Progressing

## 2024-10-23 NOTE — PLAN OF CARE
Advance diet to dysphagia 3 w/ nectar thick liquids  Ok for thin water between meals when awake and alert  Meds as tolerated w/ NTL or in applesauce  Aspiration precautions

## 2024-10-23 NOTE — PROGRESS NOTES
Progress Note - Hospitalist   Name: Tommie Taylor 59 y.o. male I MRN: [id number]  Unit/Bed#: S -01 I Date of Admission: 10/14/2024   Date of Service: 10/23/2024 I Hospital Day: 8    Assessment & Plan  CVA (cerebral vascular accident) (HCC)  Patient with history of morbid obesity, hypertension and uncontrolled diabetes who presented with right upper and right lower extremity weakness with associated right facial droop  CT/CTA head unremarkable for acute process  TnK administered following correction of hypertension with nicardipine drip in the ICU  Following thrombolytic administration patient developed worsening slurred speech and headache, had repeat CT head which was unremarkable  Repeat head CT at 24 hours post TNK negative for signs of bleed  Neurology consult  MRI significant for left corona radiata stroke.  Placed on DAPT therapy for 21 days and then continue aspirin monotherapy indefinitely per neurology.  Started on Lipitor 40 mg per stroke protocol  Recommend Zio patch outpatient to evaluate for any possible arrhythmia  Will need f/u with neurovascular team in 6-8 weeks   PT/OT recommending level 1--pending rehab  Acute kidney injury superimposed on stage 4 chronic kidney disease (HCC)  Etiology suggested to be secondary to ATN versus TMA in the setting of uncontrolled blood pressure and complicated by contrast associated nephropathy   Baseline creatinine around 2.9  Current creatinine 4.49--has been fluctuating in the mid 4 range without improvement over the course of multiple days  UA with microhematuria, proteinuria  US kidney and bladder --bladder wall thickening but no hydronephrosis  Bladder scan with no retention  No indication of dialysis at this time per nephrology  Avoid nephrotoxic agents  Monitor I&Os  Daily BMP  Nephrology following, input is appreciated.   Encephalopathy  Noted to be more lethargic on exam 10/17--then improved  Am of 10/22/2024 patient confused, found naked in bed.   Appreciate neurology reassessment--appeared stable later in the day.  Of note he does have a history of cognitive impairment with prior MoCA 18 out of 30  Repeat head CT was stable  Rechecked B12 (low in past)--now normal  Appreciate psych input, no medication adjustment was required  EEG done --pending results    Hypertensive emergency  Hypertensive emergency on admission in the setting of likely CVA  Evidenced by bp 197/104->231/105 POA  S/p Cardizem drip 10/15  PTA medications: Norvasc 10 mg and labetalol 100 mg twice daily.   Appreciate nephro assistance in BP management   Discontinued Norvasc  Increased nifedipine 90mg daily  10/22 Increased labetolol to 300mg BID  Demadex 20 mg daily--now on hold per nephrology  BPs are fluctuating.  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  This is likely contaminant and does not require further antibiotic treatment  Unclear source of infection, consider possibility of aspiration given new dysphagia from stroke  CT C/A/P wo contrast given ROSINA. No evidence of acute infectious process  After patient was noted to be afebrile 24 hours and no clear source of infection,  IV cefepime and vancomycin were discontinued 10/20/24.  Continue to monitor.  resolved    Bipolar I disorder, severe, current or most recent episode depressed, with psychotic features (HCC)  History of Bipolar disorder, follows with psychiatry.  Greatly appreciate their consultation.  Okay to maintain current medication regimen  Continue Zyprexa, buproprion, trazodone  Also on Ingrezza for tardive dyskinesia  DM2 (diabetes mellitus, type 2) ([facility])  Lab Results    Component Value Date    HGBA1C 8.6 (H) 10/16/2024     Recent Labs     10/22/24  0827 10/22/24  1119 10/22/24  2209 10/23/24  0746   POCGLU 82 114 93 76   Blood Sugar Average: Last 72 hrs:  (P) [id number]  A1c reflects poor control but blood sugars have been stable here on sliding scale alone  Currently maintained on semaglutide monotherapy outpatient- hold on admission   SSI with accu checks  Hypoglycemia protocol  Diabetic diet    VTE Pharmacologic Prophylaxis:   sc heparin    Mobility:   Basic Mobility Inpatient Raw Score: 12  JH-HLM Goal: 4: Move to chair/commode  JH-HLM Achieved: 1: Laying in bed  JH-HLM Goal NOT achieved. Continue with multidisciplinary rounding and encourage appropriate mobility to improve upon JH-HLM goals.    Patient Centered Rounds:  spoke with RN    Discussions with Specialists or Other Care Team Provider: Rounded with neurology advanced practitioner and spoke with nephrology attending and case management    Education and Discussions with Family / Patient: Updated  (significant other) via phone.    Current Length of Stay: 8 day(s)  Current Patient Status: Inpatient   Certification Statement: The patient will continue to require additional inpatient hospital stay due to persistent acute kidney injury  Discharge Plan: Anticipate discharge in 24-48 hrs to acute rehab when medically stable per nephrology    Code Status: Level 1 - Full Code    Subjective   No events or concerns reported overnight by nursing.  No complaints of urinary retention noted.    Objective :  Temp:  [98.1 °F (36.7 °C)-98.5 °F (36.9 °C)] 98.1 °F (36.7 °C)  HR:  [68-78] 78  BP: (120-179)/(73-93) 143/78  Resp:  [17-18] 17  SpO2:  [82 %-100 %] 97 %    Body mass index is 42.34 kg/m².     Input and Output Summary (last 24 hours):     Intake/Output Summary (Last 24 hours) at 10/23/2024 0945  Last data filed at 10/23/2024 0600  Gross per 24 hour   Intake 720 ml   Output 775 ml   Net -55 ml        Physical Exam  Vitals reviewed.   Constitutional:       General: He is not in acute distress.     Appearance: He is obese. He is not ill-appearing, toxic-appearing or diaphoretic.   Eyes:      General: No scleral icterus.        Right eye: No discharge.         Left eye: No discharge.      Conjunctiva/sclera: Conjunctivae normal.   Cardiovascular:      Rate and Rhythm: Normal rate and regular rhythm.      Heart sounds: No murmur heard.  Pulmonary:      Effort: No respiratory distress.      Breath sounds: No stridor. No wheezing, rhonchi or rales.   Abdominal:      General: There is no distension.      Palpations: Abdomen is soft.      Tenderness: There is no guarding.   Musculoskeletal:      Right lower leg: No edema.      Left lower leg: No edema.   Skin:     General: Skin is warm and dry.      Coloration: Skin is not jaundiced or pale.      Findings: No bruising, erythema, lesion or rash.   Neurological:      Mental Status: He is alert.      Comments: Awake alert interactive.  Pleasant and cooperative with conversation but still with severe dysarthria and right hemiparesis   Psychiatric:         Mood and Affect: Mood normal.         Lines/Drains:        Lab Results: I have reviewed the following results:   Results from last 7 days   Lab Units 10/21/24  0431   WBC Thousand/uL 7.65   HEMOGLOBIN g/dL 10.4*   HEMATOCRIT % 31.9*   PLATELETS Thousands/uL 195     Results from last 7 days   Lab Units 10/23/24  0807 10/18/24  0644 10/17/24  0459   SODIUM mmol/L 144   < > 138   POTASSIUM mmol/L 4.4   < > 5.1   CHLORIDE mmol/L 112*   < > 112*   CO2 mmol/L 23   < > 18*   BUN mg/dL 43*   < > 26*   CREATININE mg/dL 4.49*   < > 3.52*   ANION GAP mmol/L 9   < > 8   CALCIUM mg/dL 9.1   < > 8.7   ALBUMIN g/dL  --   --  3.0*   TOTAL BILIRUBIN mg/dL  --   --  0.43   ALK PHOS U/L  --   --  69   ALT U/L  --   --  9   AST U/L  --   --  21   GLUCOSE RANDOM mg/dL 82   < > 152*    < > = values in this interval not displayed.      Results from last 7 days   Lab Units 10/17/24  0459   INR  1.13     Results from last 7 days   Lab Units 10/23/24  0746 10/22/24  2209 10/22/24  1119 10/22/24  0827 10/21/24  2205 10/21/24  1650 10/21/24  1119 10/21/24  0719 10/20/24  2119 10/20/24  1547 10/20/24  1149 10/20/24  0745   POC GLUCOSE mg/dl 76 93 114 82 135 136 134 103 129 137 135 114         Results from last 7 days   Lab Units 10/20/24  0440 10/19/24  0447 10/18/24  0644 10/17/24  1522 10/17/24  0459   LACTIC ACID mmol/L  --   --   --  0.8  --    PROCALCITONIN ng/ml 0.26* 0.29* 0.29*  --  0.20       Recent Cultures (last 7 days):   Results from last 7 days   Lab Units 10/17/24  1522 10/17/24  1027   BLOOD CULTURE  No Growth After 5 Days. Actinomyces odontolyticus*   GRAM STAIN RESULT   --  Gram positive rods*         Last 24 Hours Medication List:     Current Facility-Administered Medications:     acetaminophen (TYLENOL) tablet 650 mg, Q6H PRN    aspirin (ECOTRIN LOW STRENGTH) EC tablet 81 mg, Daily    atorvastatin (LIPITOR) tablet 40 mg, QPM    buPROPion (WELLBUTRIN) tablet 100 mg, BID    calcium carbonate (TUMS) chewable tablet 1,000 mg, BID PRN    chlorhexidine (PERIDEX) 0.12 % oral rinse 15 mL, Q12H MIGEL    clopidogrel (PLAVIX) tablet 75 mg, Daily    heparin (porcine) subcutaneous injection 7,500 Units, Q8H MIGEL    hydrALAZINE (APRESOLINE) injection 10 mg, Q6H PRN    insulin lispro (HumALOG/ADMELOG) 100 units/mL subcutaneous injection 2-12 Units, TID AC **AND** Fingerstick Glucose (POCT), TID AC    labetalol (NORMODYNE) tablet 300 mg, BID    NIFEdipine (PROCARDIA XL) 24 hr tablet 90 mg, Daily    OLANZapine (ZyPREXA) tablet 10 mg, HS    pantoprazole (PROTONIX) EC tablet 40 mg, Early Morning    polyethylene glycol (MIRALAX) packet 17 g, Daily PRN    [Held by provider] torsemide (DEMADEX) tablet 20 mg, Daily    traZODone (DESYREL) tablet 50 mg, HS PRN    Valbenazine Tosylate CAPS 40 mg, HS    Administrative Statements   Today, Patient Was Seen By:  Martine Lay PA-C      **Please Note: This note may have been constructed using a voice recognition system.**

## 2024-10-23 NOTE — ASSESSMENT & PLAN NOTE
Etiology suggested to be secondary to ATN versus TMA in the setting of uncontrolled blood pressure and complicated by contrast associated nephropathy   Baseline creatinine around 2.9  Current creatinine 4.49--has been fluctuating in the mid 4 range without improvement over the course of multiple days  UA with microhematuria, proteinuria  US kidney and bladder --bladder wall thickening but no hydronephrosis  Bladder scan with no retention  No indication of dialysis at this time per nephrology  Avoid nephrotoxic agents  Monitor I&Os  Daily BMP  Nephrology following, input is appreciated.

## 2024-10-23 NOTE — ASSESSMENT & PLAN NOTE
Patient with history of morbid obesity, hypertension and uncontrolled diabetes who presented with right upper and right lower extremity weakness with associated right facial droop  CT/CTA head unremarkable for acute process  TnK administered following correction of hypertension with nicardipine drip in the ICU  Following thrombolytic administration patient developed worsening slurred speech and headache, had repeat CT head which was unremarkable  Repeat head CT at 24 hours post TNK negative for signs of bleed  Neurology consult  MRI significant for left corona radiata stroke.  Placed on DAPT therapy for 21 days and then continue aspirin monotherapy indefinitely per neurology.  Started on Lipitor 40 mg per stroke protocol  Recommend Zio patch outpatient to evaluate for any possible arrhythmia  Will need f/u with neurovascular team in 6-8 weeks   PT/OT recommending level 1--pending rehab

## 2024-10-23 NOTE — ASSESSMENT & PLAN NOTE
History of Bipolar disorder, follows with psychiatry.  Greatly appreciate their consultation.  Okay to maintain current medication regimen  Continue Zyprexa, buproprion, trazodone  Also on Ingrezza for tardive dyskinesia

## 2024-10-24 ENCOUNTER — APPOINTMENT (INPATIENT)
Dept: MRI IMAGING | Facility: HOSPITAL | Age: 59
DRG: 061 | End: 2024-10-24
Payer: MEDICARE

## 2024-10-24 ENCOUNTER — APPOINTMENT (INPATIENT)
Dept: CT IMAGING | Facility: HOSPITAL | Age: 59
DRG: 061 | End: 2024-10-24
Payer: MEDICARE

## 2024-10-24 LAB
ANION GAP SERPL CALCULATED.3IONS-SCNC: 7 MMOL/L (ref 4–13)
BASE EX.OXY STD BLDV CALC-SCNC: 95.5 % (ref 60–80)
BASE EXCESS BLDV CALC-SCNC: -2.2 MMOL/L
BUN SERPL-MCNC: 43 MG/DL (ref 5–25)
CALCIUM SERPL-MCNC: 8.6 MG/DL (ref 8.4–10.2)
CHLORIDE SERPL-SCNC: 111 MMOL/L (ref 96–108)
CO2 SERPL-SCNC: 23 MMOL/L (ref 21–32)
CREAT SERPL-MCNC: 4.31 MG/DL (ref 0.6–1.3)
GFR SERPL CREATININE-BSD FRML MDRD: 14 ML/MIN/1.73SQ M
GLUCOSE SERPL-MCNC: 108 MG/DL (ref 65–140)
GLUCOSE SERPL-MCNC: 116 MG/DL (ref 65–140)
GLUCOSE SERPL-MCNC: 77 MG/DL (ref 65–140)
GLUCOSE SERPL-MCNC: 96 MG/DL (ref 65–140)
GLUCOSE SERPL-MCNC: 97 MG/DL (ref 65–140)
HCO3 BLDV-SCNC: 20.8 MMOL/L (ref 24–30)
O2 CT BLDV-SCNC: 15.7 ML/DL
PCO2 BLDV: 30.1 MM HG (ref 42–50)
PH BLDV: 7.46 [PH] (ref 7.3–7.4)
PO2 BLDV: 141.2 MM HG (ref 35–45)
POTASSIUM SERPL-SCNC: 4.1 MMOL/L (ref 3.5–5.3)
SODIUM SERPL-SCNC: 141 MMOL/L (ref 135–147)

## 2024-10-24 PROCEDURE — 70450 CT HEAD/BRAIN W/O DYE: CPT

## 2024-10-24 PROCEDURE — 97535 SELF CARE MNGMENT TRAINING: CPT

## 2024-10-24 PROCEDURE — 99233 SBSQ HOSP IP/OBS HIGH 50: CPT | Performed by: PSYCHIATRY & NEUROLOGY

## 2024-10-24 PROCEDURE — 82948 REAGENT STRIP/BLOOD GLUCOSE: CPT

## 2024-10-24 PROCEDURE — 94760 N-INVAS EAR/PLS OXIMETRY 1: CPT

## 2024-10-24 PROCEDURE — 70551 MRI BRAIN STEM W/O DYE: CPT

## 2024-10-24 PROCEDURE — 97530 THERAPEUTIC ACTIVITIES: CPT

## 2024-10-24 PROCEDURE — 99232 SBSQ HOSP IP/OBS MODERATE 35: CPT | Performed by: INTERNAL MEDICINE

## 2024-10-24 PROCEDURE — 80048 BASIC METABOLIC PNL TOTAL CA: CPT | Performed by: PHYSICIAN ASSISTANT

## 2024-10-24 PROCEDURE — 99232 SBSQ HOSP IP/OBS MODERATE 35: CPT | Performed by: PHYSICIAN ASSISTANT

## 2024-10-24 PROCEDURE — 82805 BLOOD GASES W/O2 SATURATION: CPT | Performed by: NURSE PRACTITIONER

## 2024-10-24 RX ORDER — SODIUM CHLORIDE, SODIUM GLUCONATE, SODIUM ACETATE, POTASSIUM CHLORIDE, MAGNESIUM CHLORIDE, SODIUM PHOSPHATE, DIBASIC, AND POTASSIUM PHOSPHATE .53; .5; .37; .037; .03; .012; .00082 G/100ML; G/100ML; G/100ML; G/100ML; G/100ML; G/100ML; G/100ML
75 INJECTION, SOLUTION INTRAVENOUS CONTINUOUS
Status: DISCONTINUED | OUTPATIENT
Start: 2024-10-24 | End: 2024-10-25

## 2024-10-24 RX ADMIN — ASPIRIN 81 MG: 81 TABLET, COATED ORAL at 09:15

## 2024-10-24 RX ADMIN — HEPARIN SODIUM 7500 UNITS: 5000 INJECTION INTRAVENOUS; SUBCUTANEOUS at 17:53

## 2024-10-24 RX ADMIN — SODIUM CHLORIDE, SODIUM GLUCONATE, SODIUM ACETATE, POTASSIUM CHLORIDE, MAGNESIUM CHLORIDE, SODIUM PHOSPHATE, DIBASIC, AND POTASSIUM PHOSPHATE 75 ML/HR: .53; .5; .37; .037; .03; .012; .00082 INJECTION, SOLUTION INTRAVENOUS at 12:49

## 2024-10-24 RX ADMIN — CHLORHEXIDINE GLUCONATE 15 ML: 1.2 RINSE ORAL at 09:15

## 2024-10-24 RX ADMIN — OLANZAPINE 10 MG: 10 TABLET, FILM COATED ORAL at 21:23

## 2024-10-24 RX ADMIN — VALBENAZINE 40 MG: 40 CAPSULE ORAL at 21:24

## 2024-10-24 RX ADMIN — HEPARIN SODIUM 7500 UNITS: 5000 INJECTION INTRAVENOUS; SUBCUTANEOUS at 02:00

## 2024-10-24 RX ADMIN — LABETALOL HYDROCHLORIDE 300 MG: 100 TABLET, FILM COATED ORAL at 17:49

## 2024-10-24 RX ADMIN — CLOPIDOGREL BISULFATE 75 MG: 75 TABLET ORAL at 09:15

## 2024-10-24 RX ADMIN — LABETALOL HYDROCHLORIDE 300 MG: 100 TABLET, FILM COATED ORAL at 09:15

## 2024-10-24 RX ADMIN — CHLORHEXIDINE GLUCONATE 15 ML: 1.2 RINSE ORAL at 21:23

## 2024-10-24 RX ADMIN — ATORVASTATIN CALCIUM 40 MG: 40 TABLET, FILM COATED ORAL at 17:49

## 2024-10-24 RX ADMIN — NIFEDIPINE 90 MG: 30 TABLET, FILM COATED, EXTENDED RELEASE ORAL at 09:15

## 2024-10-24 RX ADMIN — BUPROPION HYDROCHLORIDE TABLETS 100 MG: 100 TABLET, FILM COATED ORAL at 17:49

## 2024-10-24 RX ADMIN — BUPROPION HYDROCHLORIDE TABLETS 100 MG: 100 TABLET, FILM COATED ORAL at 09:15

## 2024-10-24 RX ADMIN — PANTOPRAZOLE SODIUM 40 MG: 40 TABLET, DELAYED RELEASE ORAL at 05:37

## 2024-10-24 RX ADMIN — HEPARIN SODIUM 7500 UNITS: 5000 INJECTION INTRAVENOUS; SUBCUTANEOUS at 09:15

## 2024-10-24 NOTE — OCCUPATIONAL THERAPY NOTE
"  Occupational Therapy Progress Note     Patient Name: Tommie Taylor  Today's Date: 10/24/2024  Problem List  Principal Problem:    CVA (cerebral vascular accident) ([facility])  Active Problems:    Bipolar I disorder, severe, current or most recent episode depressed, with psychotic features ([facility])    Hypertensive emergency    DM2 (diabetes mellitus, type 2) ([facility])    Acute kidney injury superimposed on stage 4 chronic kidney disease (HCC)    Encephalopathy    SIRS (systemic inflammatory response syndrome) ([facility])              10/24/24 0831   OT Last Visit   OT Visit Date 10/24/24   Note Type   Note Type Treatment for insurance authorization   Pain Assessment   Pain Assessment Tool 0-10   Pain Score No Pain   Restrictions/Precautions   Weight Bearing Precautions Per Order No   Other Precautions Cognitive;Chair Alarm;Bed Alarm;Multiple lines;Fall Risk;Pain   Lifestyle   Autonomy Pt lives w/ family in a 2SH. Independent with ADLs, most IADLs and functional mobility w/o use of AD. (+) falls and (-) driving   Reciprocal Relationships Supportive family   Service to Others Retired working in [employer]   Intrinsic Gratification \"Busy working around the house\"   ADL   Eating Assistance 5  Supervision/Setup   Eating Deficit Setup;Increased time to complete   Eating Comments A with cutting and opening containers   Grooming Assistance 5  Supervision/Setup   Grooming Deficit Increased time to complete;Supervision/safety;Verbal cueing;Wash/dry hands;Wash/dry face   Grooming Comments use of LUE only   UB Bathing Comments declining UB bathing at this time   LB Bathing Assistance 2  Maximal Assistance   LB Bathing Deficit Increased time to complete;Supervision/safety;Verbal cueing;Buttocks;Right lower leg including foot;Left lower leg including foot;Left upper leg;Right upper leg   LB Bathing Comments Able to wash B thighs, A with B feet + buttocks + thoroughness with periarea   UB Dressing Assistance 3  Moderate Assistance   UB Dressing " "Deficit Increased time to complete;Verbal cueing;Supervision/safety;Thread RUE;Thread LUE;Pull around back   UB Dressing Comments donning robe   LB Dressing Assistance 3  Moderate Assistance   LB Dressing Deficit Increased time to complete;Verbal cueing;Supervision/safety;Don/doff R sock;Don/doff L sock   LB Dressing Comments Able to doff A socks with increased time - poor recall of one handed techniques from previous session. A with donning   Toileting Comments declining need to void this session   Bed Mobility   Additional Comments OOB and in chair at start and end of session   Transfers   Sit to Stand 2  Maximal assistance   Additional items Assist x 1;Increased time required;Verbal cues   Stand to Sit 2  Maximal assistance   Additional items Assist x 1;Increased time required;Verbal cues   Additional Comments HHA + knee block for sit >< stand - washing buttocks in standing   Subjective   Subjective \"I have the hiccups again\"   Cognition   Overall Cognitive Status Impaired   Arousal/Participation Alert;Cooperative   Attention Attends with cues to redirect   Orientation Level Oriented to person;Oriented to place;Disoriented to time;Disoriented to situation  (oriented grossly to situation - poor recall of details)   Memory Decreased short term memory;Decreased recall of recent events;Decreased recall of precautions   Following Commands Follows one step commands with increased time or repetition   Comments Pleasant and cooperative, repetition of VC throughout, poor insight into deficits   Activity Tolerance   Activity Tolerance Patient tolerated treatment well   Medical Staff Made Aware DESHAWN Benavides   Assessment   Assessment Pt seen on this date for skilled OT treatment session. At start of session pt sitting in recliner chair. Pt lethargic upon arrival - noted improved alertness with participation in ADL tasks. Requiring cuing for initiation of tasks, consistent performance with ADL tasks from previous session. Limited " involvement  of LUE. Poor recall of exercises from previous session. Continues to be limited by awareness of LUE and overall insight. Pt would continue to benefit from skilled OT treatment sessions in order to address remaining deficits   Plan   Goal Expiration Date 10/31/24   OT Treatment Day 3   OT Frequency 3-5x/wk   Discharge Recommendation   Rehab Resource Intensity Level, OT I (Maximum Resource Intensity)   Equipment Recommended Bedside commode   Commode Type Wide   AM-PAC Daily Activity Inpatient   Lower Body Dressing 2   Bathing 2   Toileting 2   Upper Body Dressing 2   Grooming 3   Eating 3   Daily Activity Raw Score 14   Daily Activity Standardized Score (Calc for Raw Score >=11) 33.39   AM-PAC Applied Cognition Inpatient   Following a Speech/Presentation 3   Understanding Ordinary Conversation 3   Taking Medications 2   Remembering Where Things Are Placed or Put Away 1   Remembering List of 4-5 Errands 1   Taking Care of Complicated Tasks 1   Applied Cognition Raw Score 11   Applied Cognition Standardized Score 27.03   End of Consult   Patient Position at End of Consult Bedside chair;Bed/Chair alarm activated;All needs within reach       GOALS:      -Patient will perform grooming tasks sitting at sink vs EOB with overall (S) in order to increase overall independence PROGRESSING     -Patient will be ASHLEY with UB dressing using AE and AD as needed in order to increase (I) with ADLs PROGRESSING     -Patient will be ASHLEY with UB bathing using AE and AD as needed in order to increase (I) with ADLs      -Patient will be ASHLEY with LB dressing with use of AE and AD as needed in order to increase (I) with ADLs PROGRESSING     -Patient will be MODA with LB bathing with use of AE and AD as needed in order to increase (I) with ADLs PROGRESSING     -Patient will complete toileting w/ MODA w/ G hygiene/thoroughness in order to reduce caregiver burden      -Patient will demonstrate (S) with bed mobility for ability to  manage own comfort and initiate OOB tasks.      -Patient will perform functional transfers with ASHLEY x 1 to/from all surfaces using DME as needed in order to increase (I) with functional tasks PROGRESSING     -Patient will be MODA x 1 with functional mobility to/from bathroom for increased independence with toileting tasks   -Patient will tolerate therapeutic activities for greater than 30 min, in order to increase tolerance for functional activities. PROGRESSING     -Patient will engage in ongoing cognitive assessment in order to assist with safe discharge planning/recommendations.     -Patient will independently integrate one pacing strategy into morning ADL's.     -Patient will demonstrate standing for 3 min in order to increase active participation in functional activities PROGRESSING        The patient's raw score on the AM-PAC Daily Activity Inpatient Short Form is 14. A raw score of less than 19 suggests the patient may benefit from discharge to post-acute rehabilitation services. HOWEVER please refer to the recommendation of the Occupational Therapist for safe discharge planning.      Iram Mccurdy MS, OTR/L

## 2024-10-24 NOTE — PROGRESS NOTES
NEPHROLOGY HOSPITAL PROGRESS NOTE   Tommie Taylor 59 y.o. male MRN: [id number]  Unit/Bed#: S -01 Encounter: [id number]  Reason for Consult: ROSINA  Assessment & Plan  Acute kidney injury superimposed on stage 4 chronic kidney disease (HCC)  Etiology: ATN from contrast associated nephropathy (10/14) versus TMA in setting of uncontrolled hypertension  Baseline creatinine around 2.9  Creatinine on admission 3.24 on 10/14  Peak creatinine 4.57 10/21  Creatinine today 4.31 improved with hydration  UA: Microhematuria (RBC 30-50), proteinuria  UPCR: 8.4 g prior to admission  Kidney ultrasound: No hydronephrosis  No hypocomplementemia  PLA2R negative  SPEP showing abnormal distribution in gamma region, previous immunofixation showed no monoclonal immunoglobulin  Continue to monitor for renal recovery  Keep holding torsemide  Repeat Isolyte 75 cc/h for 12 hours today  Trend BMP daily  No indication for renal replacement therapy  Hypertensive emergency  Blood pressure currently better controlled  Current Rx: Labetalol 300 mg twice daily, nifedipine 90 mg daily  Changes: Torsemide has been on hold since 10/23, keep torsemide on hold  CVA (cerebral vascular accident) (HCC)  Status post TNK administration  Currently on dual antiplatelet therapy with aspirin and Plavix and atorvastatin  Management per primary team and neurology  Bipolar I disorder, severe, current or most recent episode depressed, with psychotic features (HCC)  Management per primary team  SIRS (systemic inflammatory response syndrome) (HCC)  UA negative for infection  Chest x-ray: No acute cardiopulmonary disease  Lactic acid within normal limits  CT chest/abdomen/pelvis: No evidence of infectious process  Currently being monitored/observed off antibiotic    Discussed with internal medicine team.  After discussion, we agreed that in light of decreased oral intake and improvement in creatinine with IV fluids to repeat IV fluids again today and to keep  holding diuretic.    SUBJECTIVE / 24H INTERVAL HISTORY:  Urine output recorded as 700 cc.  He feels sleepy.  He denies dyspnea.  He denies leg swelling.  He tells me that his appetite is poor and not eating much.    OBJECTIVE:  Current Weight: Weight - Scale: 119 kg (262 lb 5.6 oz)  Vitals:    10/24/24 0440 10/24/24 0600 10/24/24 0736 10/24/24 0739   BP: 137/87  114/68 114/68   BP Location:       Pulse: 71  72 65   Resp:       Temp:    (!) 97.4 °F (36.3 °C)   TempSrc:       SpO2: 99%  96% 96%   Weight:  119 kg (262 lb 5.6 oz)     Height:           Intake/Output Summary (Last 24 hours) at 10/24/2024 1025  Last data filed at 10/24/2024 0217  Gross per 24 hour   Intake 1000 ml   Output 700 ml   Net 300 ml     Review of Systems   Constitutional:  Positive for appetite change. Negative for chills and fever.   HENT:  Negative for ear pain and sore throat.    Eyes:  Negative for pain and visual disturbance.   Respiratory:  Negative for cough and shortness of breath.    Cardiovascular:  Negative for chest pain and palpitations.   Gastrointestinal:  Negative for abdominal pain and vomiting.   Genitourinary:  Negative for dysuria and hematuria.   Musculoskeletal:  Negative for arthralgias and back pain.   Skin:  Negative for color change and rash.   Neurological:  Negative for seizures and syncope.   All other systems reviewed and are negative.    Physical Exam  Vitals and nursing note reviewed.   Constitutional:       General: He is not in acute distress.     Appearance: He is well-developed.   HENT:      Head: Normocephalic and atraumatic.   Eyes:      Conjunctiva/sclera: Conjunctivae normal.   Cardiovascular:      Rate and Rhythm: Normal rate and regular rhythm.      Pulses: Normal pulses.      Heart sounds: Normal heart sounds. No murmur heard.  Pulmonary:      Effort: Pulmonary effort is normal. No respiratory distress.      Breath sounds: Normal breath sounds.   Abdominal:      Palpations: Abdomen is soft.       Tenderness: There is no abdominal tenderness.   Musculoskeletal:         General: No swelling.      Cervical back: Neck supple.      Right lower leg: No edema.      Left lower leg: No edema.   Skin:     General: Skin is warm and dry.      Capillary Refill: Capillary refill takes less than 2 seconds.   Neurological:      Mental Status: He is alert.   Psychiatric:         Mood and Affect: Mood normal.           Medications:    Current Facility-Administered Medications:     acetaminophen (TYLENOL) tablet 650 mg, 650 mg, Oral, Q6H PRN, Macey Almeida PA-C, 650 mg at 10/17/24 1422    aspirin (ECOTRIN LOW STRENGTH) EC tablet 81 mg, 81 mg, Oral, Daily, Rose Miner MD, 81 mg at 10/24/24 0915    atorvastatin (LIPITOR) tablet 40 mg, 40 mg, Oral, QPM, Rose Miner MD, 40 mg at 10/23/24 1727    buPROPion (WELLBUTRIN) tablet 100 mg, 100 mg, Oral, BID, Rose Miner MD, 100 mg at 10/24/24 0915    calcium carbonate (TUMS) chewable tablet 1,000 mg, 1,000 mg, Oral, BID PRN, Leslie S Pretty, CRNP, 1,000 mg at 10/22/24 2226    chlorhexidine (PERIDEX) 0.12 % oral rinse 15 mL, 15 mL, Mouth/Throat, Q12H MIGEL, Rose Miner MD, 15 mL at 10/24/24 0915    clopidogrel (PLAVIX) tablet 75 mg, 75 mg, Oral, Daily, Rose Miner MD, 75 mg at 10/24/24 0915    heparin (porcine) subcutaneous injection 7,500 Units, 7,500 Units, Subcutaneous, Q8H MIGEL, Rose Miner MD, 7,500 Units at 10/24/24 0915    hydrALAZINE (APRESOLINE) injection 10 mg, 10 mg, Intravenous, Q6H PRN, Rose Miner MD, 10 mg at 10/17/24 1430    insulin lispro (HumALOG/ADMELOG) 100 units/mL subcutaneous injection 2-12 Units, 2-12 Units, Subcutaneous, TID AC, 4 Units at 10/23/24 1728 **AND** Fingerstick Glucose (POCT), , , TID AC, Rose Miner MD    labetalol (NORMODYNE) tablet 300 mg, 300 mg, Oral, BID, Johny Lombardo MD, 300 mg at 10/24/24 0915    NIFEdipine (PROCARDIA XL) 24 hr tablet 90 mg, 90 mg, Oral, Daily, Joselyn Reyes Bahamonde, MD, 90 mg at 10/24/24 0915     "OLANZapine (ZyPREXA) tablet 10 mg, 10 mg, Oral, HS, Rose Miner MD, 10 mg at 10/22/24 2226    pantoprazole (PROTONIX) EC tablet 40 mg, 40 mg, Oral, Early Morning, Rose Miner MD, 40 mg at 10/24/24 0537    polyethylene glycol (MIRALAX) packet 17 g, 17 g, Oral, Daily PRN, Rose Miner MD    [Held by provider] torsemide (DEMADEX) tablet 20 mg, 20 mg, Oral, Daily, Joselyn Reyes Bahamonde, MD, 20 mg at 10/22/24 1011    traZODone (DESYREL) tablet 50 mg, 50 mg, Oral, HS PRN, Rose Miner MD, 50 mg at 10/21/24 2109    Valbenazine Tosylate CAPS 40 mg, 40 mg, Oral, HS, Roes Miner MD, 40 mg at 10/23/24 2121    Laboratory Results:  Results from last 7 days   Lab Units 10/24/24  0526 10/23/24  0807 10/22/24  1020 10/21/24  0431 10/20/24  0809 10/20/24  0440 10/19/24  0447 10/18/24  0644   WBC Thousand/uL  --   --   --  7.65  --  6.86 7.54 8.28   HEMOGLOBIN g/dL  --   --   --  10.4*  --  9.3* 10.1* 10.3*   HEMATOCRIT %  --   --   --  31.9*  --  28.7* 30.8* 31.5*   PLATELETS Thousands/uL  --   --   --  195  --  178 206 212   POTASSIUM mmol/L 4.1 4.4 4.3 4.2 4.4  --  4.7 4.6   CHLORIDE mmol/L 111* 112* 111* 110* 112*  --  113* 112*   CO2 mmol/L 23 23 24 22 23  --  23 22   BUN mg/dL 43* 43* 41* 39* 36*  --  36* 33*   CREATININE mg/dL 4.31* 4.49* 4.32* 4.57* 4.43*  --  4.38* 4.19*   CALCIUM mg/dL 8.6 9.1 8.7 8.5 8.6  --  8.5 8.8       Portions of the record may have been created with voice recognition software. Occasional wrong word or \"sound a like\" substitutions may have occurred due to the inherent limitations of voice recognition software. Read the chart carefully and recognize, using context, where substitutions have occurred.If you have any questions, please contact the dictating provider.    "

## 2024-10-24 NOTE — PHYSICAL THERAPY NOTE
PHYSICAL THERAPY TREATMENT NOTE    Patient Name: Tommie Taylor  Today's Date: 10/24/2024     10/24/24 0729   PT Last Visit   PT Visit Date 10/24/24   Pain Assessment   Pain Assessment Tool 0-10   Pain Score No Pain   Restrictions/Precautions   Other Precautions Chair Alarm;Bed Alarm;Fall Risk;Aspiration  (Masimo)   General   Chart Reviewed Yes   Additional Pertinent History room air resting pulse ox 95% and 78 BPM. blood pressure supine 114/68.   Family/Caregiver Present No   Cognition   Arousal/Participation Lethargic   Attention Attends with cues to redirect   Orientation Level Oriented to person;Other (Comment)  (pt was identified w/ full name, birth date)   Following Commands Follows one step commands with increased time or repetition   Subjective   Subjective pt seen supine in bed. pt's speech is garbled and difficult to understand. redirection was needed for task focus. pt denied pain or dizziness. agreed to participate in PT session.   Bed Mobility   Supine to Sit 3  Moderate assistance   Additional items Assist x 1;HOB elevated;Bedrails;Increased time required;Verbal cues;LE management  (for bedrail use, trunk/LE positioning, R UE awareness management)   Transfers   Sit to Stand 2  Maximal assistance   Additional items Assist x 1;Increased time required;Verbal cues  (for LE positioning, hand placement)   Stand to Sit 2  Maximal assistance  (poorly controlled descent to sitting surface)   Additional items Assist x 1;Impulsive;Verbal cues  (for body positioning, task focus/safety)   Stand pivot 2  Maximal assistance  (completed to pt's left side)   Additional items Assist x 1;Increased time required;Impulsive;Verbal cues  (for LE positioning, task focus/safety)   Additional Comments pt stood 20 seconds w/ SBQC and maxx1 (w/ guarding of R UE to prevent shoulder subluxation). additional standing not possible due to fatigue. pt  was unable to shift weight onto R LE due to buckling when attempted. seated rest break. pt stood 45 seconds w/ SBQC w/ maxx1. seated rest break. pt completed stand pivot transfer to left as noted above.   Ambulation/Elevation   Gait pattern Not appropriate   Assistive Device Small base quad cane   Balance   Static Sitting Fair -   Static Standing Poor -  (w/ SBQC)   Ambulatory Zero   Activity Tolerance   Activity Tolerance Patient limited by fatigue   Nurse Made Aware spoke to Janell Kaur CM   Assessment   Problem List Decreased strength;Decreased range of motion;Decreased endurance;Impaired balance;Decreased mobility;Decreased coordination;Obesity;Decreased safety awareness   Assessment pt shows significant decline in mobility status from previous session w/ decreased level of active participation/communication. also decline noted in increased level of assistance required to mobilize and inability to ambulate. uncertain of etiology of functional and mobility decline. pt is at high risk for falls due to physical and safety awareness deficits. continued inpatient PT intervention would benefit pt to address mobility deficits and increase levels of mobility and independence.   Goals   Patient Goals I want to be able to walk   STG Expiration Date 10/29/24   Short Term Goal #1 Patient PT goals established in order to maximize functional independence and safety. Pt will: complete all bed mobility independently in flat bed in order to promote increased OOB functional mobility and simulate home environment; complete all transfers with LRAD and S in order to increase safety with functional mobility; ambulate >80ft with LRAD and ASHLEY in order to increase safety with household and in facility distance functional mobility; negotiate 2-3 stairs with LRAD and ASHLEY in order to facilitate safe access to his home; improve R LE strength to >/= 3+/5 MMT t/o in order to increase safety with functional mobility and decrease risk of  falls; demonstrate understanding and independence with LE strengthening HEP; improve ambulatory balance to >/= fair grade in order to promote safety and increased independence with mobility; tolerate >3hrs OOB in upright position, in order to improve muscular endurance and respiratory status; improve AM-PAC score to >/= 16/24 in order to increase independence with mobility and decrease burden of care; improve Barthel Index score to >/= 50/100 in order to increase independence and decrease risk of falls.   PT Treatment Day 4   Plan   Treatment/Interventions Functional transfer training;LE strengthening/ROM;Elevations;Therapeutic exercise;Endurance training;Patient/family training;Equipment eval/education;Bed mobility;Gait training;Compensatory technique education   Progress Slow progress, cognitive deficits   PT Frequency 4-6x/wk   Discharge Recommendation   Rehab Resource Intensity Level, PT I (Maximum Resource Intensity)   AM-PAC Basic Mobility Inpatient   Turning in Flat Bed Without Bedrails 2   Lying on Back to Sitting on Edge of Flat Bed Without Bedrails 2   Moving Bed to Chair 2   Standing Up From Chair Using Arms 2   Walk in Room 1   Climb 3-5 Stairs With Railing 1   Basic Mobility Inpatient Raw Score 10   Turning Head Towards Sound 3   Follow Simple Instructions 3   Low Function Basic Mobility Raw Score  16   Low Function Basic Mobility Standardized Score  25.72   [facility] Highest Level Of Mobility   -[facility] Goal 4: Move to chair/commode   -[facility] Achieved 5: Stand (1 or more minutes)   End of Consult   Patient Position at End of Consult Bedside chair;Bed/Chair alarm activated;All needs within reach;Other (comment)  (pillow positioned under pt's R UE)     The patient's AM-PAC Basic Mobility Inpatient Short Form Raw Score is 10. A Raw score of less than or equal to 16 suggests the patient may benefit from discharge to post-acute rehabilitation services. Please also refer to the recommendation of the Physical  Therapist for safe discharge planning.    Skilled inpatient PT recommended while in hospital to progress pt toward treatment goals.    Brannon Amaral, PT

## 2024-10-24 NOTE — PROGRESS NOTES
Progress Note - Hospitalist   Name: Tommie Taylor 59 y.o. male I MRN: [id number]  Unit/Bed#: S -01 I Date of Admission: 10/14/2024   Date of Service: 10/24/2024 I Hospital Day: 9    Assessment & Plan  CVA (cerebral vascular accident) (HCC)  Patient with history of morbid obesity, hypertension and uncontrolled diabetes who presented with right upper and right lower extremity weakness with associated right facial droop  CT/CTA head unremarkable for acute process  TnK administered following correction of hypertension with nicardipine drip in the ICU  Following thrombolytic administration patient developed worsening slurred speech and headache, had repeat CT head which was unremarkable  Repeat head CT at 24 hours post TNK negative for signs of bleed  Neurology consult  MRI significant for left corona radiata stroke.  Placed on DAPT therapy for 21 days and then continue aspirin monotherapy indefinitely per neurology.  Started on Lipitor 40 mg per stroke protocol  Recommend Zio patch outpatient to evaluate for any possible arrhythmia  Will need f/u with neurovascular team in 6-8 weeks   PT/OT recommending level 1--pending rehab  Acute kidney injury superimposed on stage 4 chronic kidney disease (HCC)  Etiology suggested to be secondary to ATN versus TMA in the setting of uncontrolled blood pressure and complicated by contrast associated nephropathy   Baseline creatinine around 2.9  Current creatinine 4.31--has been fluctuating in the mid 4 range without improvement over the course of multiple days  UA with microhematuria, proteinuria  US kidney and bladder --bladder wall thickening but no hydronephrosis  Bladder scan with no retention  No indication of dialysis at this time per nephrology  Avoid nephrotoxic agents  Monitor I&Os  Daily BMP  Nephrology following, input is appreciated.  Demadex being held and IV fluids were given transiently  Encephalopathy  Noted to be more lethargic on exam 10/17--then  improved  Am of 10/22/2024 patient confused, found naked in bed.  Appreciate neurology reassessment--appeared stable later in the day.  Of note he does have a history of cognitive impairment with prior MoCA 18 out of 30  Repeat head CT was stable  Rechecked B12 (low in past)--now normal  Appreciate psych input, no medication adjustment was required  EEG showed moderate nonspecific cerebral dysfunction with no epileptic changes  Another head CT was performed on October 23 and was stable.  Repeat MRI performed this morning, await results    Hypertensive emergency  Hypertensive emergency on admission in the setting of likely CVA  Evidenced by bp 197/104->231/105 POA  S/p Cardizem drip 10/15  PTA medications: Norvasc 10 mg and labetalol 100 mg twice daily.   Appreciate nephro assistance in BP management   Discontinued Norvasc  Increased nifedipine 90mg daily  10/22 Increased labetolol to 300mg BID  Demadex 20 mg daily--now on hold per nephrology  BPs more recently are stable.  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  This is likely contaminant and does not require further antibiotic treatment  Unclear source of infection, consider possibility of aspiration given new dysphagia from stroke  CT C/A/P wo contrast given ROSINA. No evidence of acute infectious process  After patient was noted to be afebrile 24 hours and no clear source of infection,  IV cefepime and vancomycin were discontinued 10/20/24.  Continue to monitor.  resolved    Bipolar I disorder, severe, current or most recent episode depressed, with psychotic features (HCC)  History of Bipolar disorder, follows  with psychiatry.  Greatly appreciate their consultation.  Okay to maintain current medication regimen  Continue Zyprexa, buproprion, trazodone  Also on Ingrezza for tardive dyskinesia  DM2 (diabetes mellitus, type 2) ([facility])  Lab Results   Component Value Date    HGBA1C 8.6 (H) 10/16/2024     Recent Labs     10/23/24  1128 10/23/24  1535 10/23/24  2104 10/24/24  0740   POCGLU 96 219* 134 96   Blood Sugar Average: Last 72 hrs:  (P) [id number]  A1c reflects poor control but blood sugars have been stable here on sliding scale alone  Currently maintained on semaglutide monotherapy outpatient- hold on admission   SSI with accu checks  Hypoglycemia protocol  Diabetic diet    VTE Pharmacologic Prophylaxis:       Mobility:   Basic Mobility Inpatient Raw Score: 10  -HLM Goal: 4: Move to chair/commode  JH-HLM Achieved: 5: Stand (1 or more minutes)  JH-HLM Goal achieved. Continue to encourage appropriate mobility.    Patient Centered Rounds: I performed bedside rounds with nursing staff today.   Discussions with Specialists or Other Care Team Provider: case mgmt, neurology    Education and Discussions with Family / Patient: Attempted to update  (significant other) via phone. Left voicemail.     Current Length of Stay: 9 day(s)  Current Patient Status: Inpatient   Certification Statement: The patient will continue to require additional inpatient hospital stay due to pending MRI results and rehab availability  Discharge Plan: Anticipate discharge later today or tomorrow to acute rehab    Code Status: Level 1 - Full Code    Subjective   Patient offered minimal history and was noted to be fatigued    Objective :  Temp:  [97.4 °F (36.3 °C)-98.2 °F (36.8 °C)] 97.4 °F (36.3 °C)  HR:  [61-78] 65  BP: (114-137)/(68-87) 114/68  Resp:  [18] 18  SpO2:  [96 %-99 %] 96 %  O2 Device: None (Room air)    Body mass index is 42.34 kg/m².     Input and Output Summary (last 24 hours):     Intake/Output Summary (Last 24  hours) at 10/24/2024 1108  Last data filed at 10/24/2024 0900  Gross per 24 hour   Intake 1240 ml   Output 700 ml   Net 540 ml       Physical Exam  Vitals reviewed.   Constitutional:       General: He is not in acute distress.     Appearance: He is obese. He is not ill-appearing, toxic-appearing or diaphoretic.   HENT:      Nose: No congestion or rhinorrhea.   Eyes:      General: No scleral icterus.        Right eye: No discharge.         Left eye: No discharge.   Cardiovascular:      Rate and Rhythm: Normal rate and regular rhythm.      Heart sounds: No murmur heard.  Pulmonary:      Effort: No respiratory distress.      Breath sounds: No stridor. No wheezing or rhonchi.   Abdominal:      General: There is no distension.      Tenderness: There is no abdominal tenderness.   Musculoskeletal:      Right lower leg: No edema.      Left lower leg: No edema.   Skin:     General: Skin is warm and dry.      Coloration: Skin is not jaundiced or pale.      Findings: No bruising, erythema, lesion or rash.   Neurological:      Comments: Drowsy but able to awake enough to converse briefly.  Oriented with cueing.  Still with significant dysarthria           Lines/Drains:          Lab Results: I have reviewed the following results:   Results from last 7 days   Lab Units 10/21/24  0431   WBC Thousand/uL 7.65   HEMOGLOBIN g/dL 10.4*   HEMATOCRIT % 31.9*   PLATELETS Thousands/uL 195     Results from last 7 days   Lab Units 10/24/24  0526   SODIUM mmol/L 141   POTASSIUM mmol/L 4.1   CHLORIDE mmol/L 111*   CO2 mmol/L 23   BUN mg/dL 43*   CREATININE mg/dL 4.31*   ANION GAP mmol/L 7   CALCIUM mg/dL 8.6   GLUCOSE RANDOM mg/dL 77         Results from last 7 days   Lab Units 10/24/24  0740 10/23/24  2104 10/23/24  1535 10/23/24  1128 10/23/24  0746 10/22/24  2209 10/22/24  1119 10/22/24  0827 10/21/24  2205 10/21/24  1650 10/21/24  1119 10/21/24  0719   POC GLUCOSE mg/dl 96 134 219* 96 76 93 114 82 135 136 134 103         Results from last  7 days   Lab Units 10/20/24  0440 10/19/24  0447 10/18/24  0644 10/17/24  1522   LACTIC ACID mmol/L  --   --   --  0.8   PROCALCITONIN ng/ml 0.26* 0.29* 0.29*  --        Recent Cultures (last 7 days):   Results from last 7 days   Lab Units 10/17/24  1522   BLOOD CULTURE  No Growth After 5 Days.       Reviewed CT head and MRI brain    Last 24 Hours Medication List:     Current Facility-Administered Medications:     acetaminophen (TYLENOL) tablet 650 mg, Q6H PRN    aspirin (ECOTRIN LOW STRENGTH) EC tablet 81 mg, Daily    atorvastatin (LIPITOR) tablet 40 mg, QPM    buPROPion (WELLBUTRIN) tablet 100 mg, BID    calcium carbonate (TUMS) chewable tablet 1,000 mg, BID PRN    chlorhexidine (PERIDEX) 0.12 % oral rinse 15 mL, Q12H MIGEL    clopidogrel (PLAVIX) tablet 75 mg, Daily    heparin (porcine) subcutaneous injection 7,500 Units, Q8H MIGEL    hydrALAZINE (APRESOLINE) injection 10 mg, Q6H PRN    insulin lispro (HumALOG/ADMELOG) 100 units/mL subcutaneous injection 2-12 Units, TID AC **AND** Fingerstick Glucose (POCT), TID AC    labetalol (NORMODYNE) tablet 300 mg, BID    NIFEdipine (PROCARDIA XL) 24 hr tablet 90 mg, Daily    OLANZapine (ZyPREXA) tablet 10 mg, HS    pantoprazole (PROTONIX) EC tablet 40 mg, Early Morning    polyethylene glycol (MIRALAX) packet 17 g, Daily PRN    [Held by provider] torsemide (DEMADEX) tablet 20 mg, Daily    traZODone (DESYREL) tablet 50 mg, HS PRN    Valbenazine Tosylate CAPS 40 mg, HS    Administrative Statements   Today, Patient Was Seen By: Martine Lay PA-C      **Please Note: This note may have been constructed using a voice recognition system.**

## 2024-10-24 NOTE — ASSESSMENT & PLAN NOTE
Tommie Taylor is a 59 y.o. male with history of HTN, HLD, DM type II who presented to [facility] ED on 10/14/2024 as a stroke alert at 2242. LKN 2200. He was in his normal state of health going to the bathroom when he suddenly experienced dizziness and collapsed. Also reported mild blurry vision and headache. While trying to get up, he noted being unable to move his RUE/RLE. NIHSS 8 for RUE/RLE weakness, R facial weakness, and extinction. Initial . CTH/CTA H/N negative for acute intracranial abnormalities, LVO, or severe vascular stenosis. TNK administration delayed to tx HTN. TNK given on 10/14 at 2340.    Following TNK administration, pt noted to have worsening dysarthria and headache. Repeat CTH showed no acute intracranial pathology including no acute hemorrhage.  CTH 24 H s/p IV TNK unremarkable for acute pathology.  MRI brain demonstrated acute Left corona radiata infarct.  Recommendations were made to continue ASA 81 mg daily and Plavix 75 mg daily x 21 days, then transition to ASA monotherapy as patient was not on AP/AC therapy at baseline.    Workup:  - CT head: Unremarkable for acute intracranial abnormalities  - CTA head and neck: Unremarkable for large vessel occlusion or critical stenosis  - Repeat CT head for headache/slurred speech following IV TNK: Unremarkable for acute pathology  - Repeat CT head 24H s/p IV TNK: Unremarkable for acute pathology  - MRI brain 10/16: Acute left corona radiata infarct noted  - Repeat CT head for lethargy/AMS 10/17: Unremarkable for acute intracranial abnormalities  - Repeat CT head for confusion 10/22: Unremarkable for acute intracranial abnormalities    - Echo: EF 70%, wall motion normal; bilateral atrium size WNL  - Lipid panel: Total cholesterol 154, triglycerides 120, HDL 40, LDL 90  - Hemoglobin A1c: Elevated 8.6    Neurology asked to reevaluate the patient on 10/22/2024 for confusion. Repeat CT head on 10/22 unremarkable for acute pathology.  Repeat CT  head obtained again overnight on 10/24 for confusion.  Given persistent confusion and evidence of bihemispheric involvement on recent MRI brain, repeat MRI brain obtained rule out another embolic event.  Repeat MRI brain unremarkable for evidence of new infarcts.  Concern confusion/lethargy likely related to untreated sleep apnea in the setting of cognitive impairment at baseline.    Plan:  Will need outpatient cardiac monitoring (Zio patch or holter monitor) - will need cardiology referral  Continue ASA 81 mg daily and Plavix 75 mg daily x 21 days, then transition to ASA monotherapy  Continue Atorvastatin 40mg daily  Goal normotension, euglycemia, normothermia  Monitor on telemetry for cardiac arrhythmia  PT/OT/Speech  Rest of care per primary team appreciated  Will need outpatient follow up with neurovascular attending in 6-8 weeks (60 min visit). No further outpatient neurodiagnostic testing needed at this time.

## 2024-10-24 NOTE — ASSESSMENT & PLAN NOTE
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  This is likely contaminant and does not require further antibiotic treatment  Unclear source of infection, consider possibility of aspiration given new dysphagia from stroke  CT C/A/P wo contrast given ROSINA. No evidence of acute infectious process  After patient was noted to be afebrile 24 hours and no clear source of infection,  IV cefepime and vancomycin were discontinued 10/20/24.  Continue to monitor.  resolved

## 2024-10-24 NOTE — PLAN OF CARE
Problem: OCCUPATIONAL THERAPY ADULT  Goal: Performs self-care activities at highest level of function for planned discharge setting.  See evaluation for individualized goals.  Description: Treatment Interventions: ADL retraining, Functional transfer training, UE strengthening/ROM, Endurance training, Cognitive reorientation, Patient/family training, Equipment evaluation/education, Neuromuscular reeducation, Fine motor coordination activities, Compensatory technique education, Continued evaluation, Energy conservation, Activityengagement  Equipment Recommended: Bedside commode       See flowsheet documentation for full assessment, interventions and recommendations.   Outcome: Progressing  Note: Limitation: Decreased ADL status, Decreased UE ROM, Decreased UE strength, Decreased Safe judgement during ADL, Decreased fine motor control, Decreased self-care trans, Decreased high-level ADLs (R estefania-paresis, motor planning, safety awareness, balance, activity tolerance)  Prognosis: Fair  Assessment: Pt seen on this date for skilled OT treatment session. At start of session pt sitting in recliner chair. Pt lethargic upon arrival - noted improved alertness with participation in ADL tasks. Requiring cuing for initiation of tasks, consistent performance with ADL tasks from previous session. Limited involvement  of LUE. Poor recall of exercises from previous session. Continues to be limited by awareness of LUE and overall insight. Pt would continue to benefit from skilled OT treatment sessions in order to address remaining deficits     Rehab Resource Intensity Level, OT: I (Maximum Resource Intensity)

## 2024-10-24 NOTE — ASSESSMENT & PLAN NOTE
Hypertensive emergency on admission in the setting of likely CVA  Evidenced by bp 197/104->231/105 POA  S/p Cardizem drip 10/15  PTA medications: Norvasc 10 mg and labetalol 100 mg twice daily.   Appreciate nephro assistance in BP management   Discontinued Norvasc  Increased nifedipine 90mg daily  10/22 Increased labetolol to 300mg BID  Demadex 20 mg daily--now on hold per nephrology  BPs more recently are stable.  Goal is normotension now per neurology

## 2024-10-24 NOTE — ASSESSMENT & PLAN NOTE
Patient with history of morbid obesity, hypertension and uncontrolled diabetes who presented with right upper and right lower extremity weakness with associated right facial droop  CT/CTA head unremarkable for acute process  TnK administered following correction of hypertension with nicardipine drip in the ICU  Following thrombolytic administration patient developed worsening slurred speech and headache, had repeat CT head which was unremarkable  Repeat head CT at 24 hours post TNK negative for signs of bleed  Neurology consult  MRI significant for left corona radiata stroke.  Placed on DAPT therapy for 21 days and then continue aspirin monotherapy indefinitely per neurology.  Started on Lipitor 40 mg per stroke protocol  Recommend Zio patch outpatient to evaluate for any possible arrhythmia  Will need f/u with neurovascular team in 6-8 weeks   PT/OT recommending level 1--pending rehab   Double M-Plasty Complex Repair Preamble Text (Leave Blank If You Do Not Want): Extensive wide undermining was performed.

## 2024-10-24 NOTE — PLAN OF CARE
Problem: PHYSICAL THERAPY ADULT  Goal: Performs mobility at highest level of function for planned discharge setting.  See evaluation for individualized goals.  Description: Treatment/Interventions: Functional transfer training, LE strengthening/ROM, Elevations, Therapeutic exercise, Endurance training, Patient/family training, Equipment eval/education, Bed mobility, Gait training, Compensatory technique education, Spoke to nursing, Spoke to case management    Equipment Recommended: Other (Comment) (TBD pending progress)     See flowsheet documentation for full assessment, interventions and recommendations.  Outcome: Not Progressing  Note: Prognosis: Good  Problem List: Decreased strength, Decreased range of motion, Decreased endurance, Impaired balance, Decreased mobility, Decreased coordination, Obesity, Decreased safety awareness  Assessment: pt shows significant decline in mobility status from previous session w/ decreased level of active participation/communication. also decline noted in increased level of assistance required to mobilize and inability to ambulate. uncertain of etiology of functional and mobility decline. pt is at high risk for falls due to physical and safety awareness deficits. continued inpatient PT intervention would benefit pt to address mobility deficits and increase levels of mobility and independence.  Barriers to Discharge: Inaccessible home environment     Rehab Resource Intensity Level, PT: I (Maximum Resource Intensity)    See flowsheet documentation for full assessment.

## 2024-10-24 NOTE — ASSESSMENT & PLAN NOTE
Etiology suggested to be secondary to ATN versus TMA in the setting of uncontrolled blood pressure and complicated by contrast associated nephropathy   Baseline creatinine around 2.9  Current creatinine 4.31--has been fluctuating in the mid 4 range without improvement over the course of multiple days  UA with microhematuria, proteinuria  US kidney and bladder --bladder wall thickening but no hydronephrosis  Bladder scan with no retention  No indication of dialysis at this time per nephrology  Avoid nephrotoxic agents  Monitor I&Os  Daily BMP  Nephrology following, input is appreciated.  Demadex being held and IV fluids were given transiently

## 2024-10-24 NOTE — ASSESSMENT & PLAN NOTE
Neurology asked to reevaluate the patient on 10/22/2024 for confusion, reportedly found naked in bed this AM.      Of note, patient was noted to be febrile on 10/17 with a Tmax of 101.3 °F and was also tachycardic.  UA and CXR unremarkable.  Blood cultures 1/2 positive for gram-positive rods with identification panel inconclusive, concerning for contaminant.  CT CAP 10/19 unremarkable.  Appears to have received 3 days of IV cefepime (10/18-10/20), antibiotics discontinued as patient had been afebrile for 24 hours with no clear source of infection.    Repeat CT head on 10/22, unremarkable for acute intracranial abnormalities. Routine EEG on 10/22 was unremarkable for electrographic seizures, interictal epileptiform discharges, or focal slowing clinical events captured    Etiology of encephalopathy unclear. Repeat CTH and Routine EEG unremarkable.  Additional repeat CT head completed overnight 10/24 for confusion.  Given persistent confusion, repeat MRI brain obtained and unremarkable for evidence of new acute infarct. Of note, patient found to have documented cognitive impairment at baseline from April 2024.  At that time patient was noted to be occasionally disoriented, misplacing objects, and forgetting if he had eaten or taken his meds.  MoCA was performed in the office setting, patient scoring 18/30 indicating significant cognitive deficits.      Suspect confusion and lethargy likely related to untreated sleep apnea and delirium in the inpatient hospital setting, in the setting of baseline cognitive impairment.    Plan:  No further inpatient recommendations at this time  Would not recommend initiating AEDs at this time  Delirium precautions  Correction of infectious/metabolic derangements per primary team  Medical management supportive care per primary team, notify with changes

## 2024-10-24 NOTE — ASSESSMENT & PLAN NOTE
Blood pressure currently better controlled  Current Rx: Labetalol 300 mg twice daily, nifedipine 90 mg daily  Changes: Torsemide has been on hold since 10/23, keep torsemide on hold

## 2024-10-24 NOTE — ASSESSMENT & PLAN NOTE
Etiology: ATN from contrast associated nephropathy (10/14) versus TMA in setting of uncontrolled hypertension  Baseline creatinine around 2.9  Creatinine on admission 3.24 on 10/14  Peak creatinine 4.57 10/21  Creatinine today 4.31 improved with hydration  UA: Microhematuria (RBC 30-50), proteinuria  UPCR: 8.4 g prior to admission  Kidney ultrasound: No hydronephrosis  No hypocomplementemia  PLA2R negative  SPEP showing abnormal distribution in gamma region, previous immunofixation showed no monoclonal immunoglobulin  Continue to monitor for renal recovery  Keep holding torsemide  Repeat Isolyte 75 cc/h for 12 hours today  Trend BMP daily  No indication for renal replacement therapy

## 2024-10-24 NOTE — PROGRESS NOTES
Progress Note - Neurology   Name: Tommie Taylor 59 y.o. male I MRN: [id number]  Unit/Bed#: S -01 I Date of Admission: 10/14/2024   Date of Service: 10/24/2024 I Hospital Day: 9     Assessment & Plan  CVA (cerebral vascular accident) (HCC)  Tommie Taylor is a 59 y.o. male with history of HTN, HLD, DM type II who presented to [place] ED on 10/14/2024 as a stroke alert at 2242. LKN 2200. He was in his normal state of health going to the bathroom when he suddenly experienced dizziness and collapsed. Also reported mild blurry vision and headache. While trying to get up, he noted being unable to move his RUE/RLE. NIHSS 8 for RUE/RLE weakness, R facial weakness, and extinction. Initial . CTH/CTA H/N negative for acute intracranial abnormalities, LVO, or severe vascular stenosis. TNK administration delayed to tx HTN. TNK given on 10/14 at 2340.    Following TNK administration, pt noted to have worsening dysarthria and headache. Repeat CTH showed no acute intracranial pathology including no acute hemorrhage.  CTH 24 H s/p IV TNK unremarkable for acute pathology.  MRI brain demonstrated acute Left corona radiata infarct.  Recommendations were made to continue ASA 81 mg daily and Plavix 75 mg daily x 21 days, then transition to ASA monotherapy as patient was not on AP/AC therapy at baseline.    Workup:  - CT head: Unremarkable for acute intracranial abnormalities  - CTA head and neck: Unremarkable for large vessel occlusion or critical stenosis  - Repeat CT head for headache/slurred speech following IV TNK: Unremarkable for acute pathology  - Repeat CT head 24H s/p IV TNK: Unremarkable for acute pathology  - MRI brain 10/16: Acute left corona radiata infarct noted  - Repeat CT head for lethargy/AMS 10/17: Unremarkable for acute intracranial abnormalities  - Repeat CT head for confusion 10/22: Unremarkable for acute intracranial abnormalities    - Echo: EF 70%, wall motion normal; bilateral atrium size  WNL  - Lipid panel: Total cholesterol 154, triglycerides 120, HDL 40, LDL 90  - Hemoglobin A1c: Elevated 8.6    Neurology asked to reevaluate the patient on 10/22/2024 for confusion. Repeat CT head on 10/22 unremarkable for acute pathology.  Repeat CT head obtained again overnight on 10/24 for confusion.  Given persistent confusion and evidence of bihemispheric involvement on recent MRI brain, repeat MRI brain obtained rule out another embolic event.  Repeat MRI brain unremarkable for evidence of new infarcts.  Concern confusion/lethargy likely related to untreated sleep apnea in the setting of cognitive impairment at baseline.    Plan:  Will need outpatient cardiac monitoring (Zio patch or holter monitor) - will need cardiology referral  Continue ASA 81 mg daily and Plavix 75 mg daily x 21 days, then transition to ASA monotherapy  Continue Atorvastatin 40mg daily  Goal normotension, euglycemia, normothermia  Monitor on telemetry for cardiac arrhythmia  PT/OT/Speech  Rest of care per primary team appreciated  Will need outpatient follow up with neurovascular attending in 6-8 weeks (60 min visit). No further outpatient neurodiagnostic testing needed at this time.  Encephalopathy  Neurology asked to reevaluate the patient on 10/22/2024 for confusion, reportedly found naked in bed this AM.      Of note, patient was noted to be febrile on 10/17 with a Tmax of 101.3 °F and was also tachycardic.  UA and CXR unremarkable.  Blood cultures 1/2 positive for gram-positive rods with identification panel inconclusive, concerning for contaminant.  CT CAP 10/19 unremarkable.  Appears to have received 3 days of IV cefepime (10/18-10/20), antibiotics discontinued as patient had been afebrile for 24 hours with no clear source of infection.    Repeat CT head on 10/22, unremarkable for acute intracranial abnormalities. Routine EEG on 10/22 was unremarkable for electrographic seizures, interictal epileptiform discharges, or focal  slowing clinical events captured    Etiology of encephalopathy unclear. Repeat CTH and Routine EEG unremarkable.  Additional repeat CT head completed overnight 10/24 for confusion.  Given persistent confusion, repeat MRI brain obtained and unremarkable for evidence of new acute infarct. Of note, patient found to have documented cognitive impairment at baseline from April 2024.  At that time patient was noted to be occasionally disoriented, misplacing objects, and forgetting if he had eaten or taken his meds.  MoCA was performed in the office setting, patient scoring 18/30 indicating significant cognitive deficits.      Suspect confusion and lethargy likely related to untreated sleep apnea and delirium in the inpatient hospital setting, in the setting of baseline cognitive impairment.    Plan:  No further inpatient recommendations at this time  Would not recommend initiating AEDs at this time  Delirium precautions  Correction of infectious/metabolic derangements per primary team  Medical management supportive care per primary team, notify with changes    Neurology follow-up:  Tommie Taylor will need follow-up in  4-6 weeks  with neurovascular team for Other in 60 minute appointment. They will require a extended cardiac monitoring within 4 weeks.     Subjective   Today patient states he feels okay.  States he continues to experience the right-sided weakness which he reports he has not had any improvement.    Objective :  Temp:  [97.4 °F (36.3 °C)-98.2 °F (36.8 °C)] 97.4 °F (36.3 °C)  HR:  [61-78] 65  BP: (114-137)/(68-87) 114/68  Resp:  [18] 18  SpO2:  [96 %-99 %] 96 %  O2 Device: None (Room air)    Physical Exam  Vitals and nursing note reviewed.   Constitutional:       General: He is not in acute distress.     Appearance: He is obese. He is not ill-appearing, toxic-appearing or diaphoretic.   HENT:      Head: Normocephalic and atraumatic.   Eyes:      General: No scleral icterus.        Right eye: No discharge.          Left eye: No discharge.      Conjunctiva/sclera: Conjunctivae normal.   Skin:     General: Skin is warm and dry.   Neurological:      Mental Status: He is alert.   Psychiatric:         Mood and Affect: Mood normal.         Behavior: Behavior normal.       Neurological Exam  Mental Status  Alert.  Patient is alert, sitting up in chair, eating breakfast  Appears to be more awake today   Oriented to person, place, month, year  Able to name objects provided  Follows central and appendicular commands   Answers questions appropriately   Mild dysarthria noted .    Cranial Nerves  Does not maintain eye contact with provider  Primary gaze midline, conjugate gaze noted  Able to reach end gaze when looking to the left  Unable to reach end gaze when looking to the right  No evidence of nystagmus  Limited upward and downward gaze  No visual deficits noted  Right lower facial weakness noted  Tongue deviates to the right  Mild macroglossia noted.    Motor    No movement in right hand or wrist, 0/5  Right biceps 3 -/5  Right triceps 2/5  Right deltoid 2/5    Left  5/5  Left biceps, triceps, deltoid 5/5    BLE assessment patient is sitting up in chair, limiting movement and hips  Right hip flexion 4 -/5  Left hip flexion 4/5  Right dorsiflexion 4+/5, left 5/5  Bilateral plantarflexion 5/5.    Sensory  Light touch is normal in upper and lower extremities.     Reflexes  No involuntary movements or rhythmic seizure-like activity noted throughout exam.    Gait    Gait testing deferred due to fall risk.      Lab Results: I have personally reviewed pertinent reports.  Recent Results (from the past 24 hour(s))   Fingerstick Glucose (POCT)    Collection Time: 10/23/24  3:35 PM   Result Value Ref Range    POC Glucose 219 (H) 65 - 140 mg/dl   Fingerstick Glucose (POCT)    Collection Time: 10/23/24  9:04 PM   Result Value Ref Range    POC Glucose 134 65 - 140 mg/dl   Blood gas, venous    Collection Time: 10/24/24  4:58 AM   Result  Value Ref Range    pH, Donnell 7.457 (H) 7.300 - 7.400    pCO2, Donnell 30.1 (L) 42.0 - 50.0 mm Hg    pO2, Donnell 141.2 (H) 35.0 - 45.0 mm Hg    HCO3, Donnell 20.8 (L) 24 - 30 mmol/L    Base Excess, Donnell -2.2 mmol/L    O2 Content, Donnell 15.7 ml/dL    O2 HGB, VENOUS 95.5 (H) 60.0 - 80.0 %   Basic metabolic panel    Collection Time: 10/24/24  5:26 AM   Result Value Ref Range    Sodium 141 135 - 147 mmol/L    Potassium 4.1 3.5 - 5.3 mmol/L    Chloride 111 (H) 96 - 108 mmol/L    CO2 23 21 - 32 mmol/L    ANION GAP 7 4 - 13 mmol/L    BUN 43 (H) 5 - 25 mg/dL    Creatinine 4.31 (H) 0.60 - 1.30 mg/dL    Glucose 77 65 - 140 mg/dL    Calcium 8.6 8.4 - 10.2 mg/dL    eGFR 14 ml/min/1.73sq m   Fingerstick Glucose (POCT)    Collection Time: 10/24/24  7:40 AM   Result Value Ref Range    POC Glucose 96 65 - 140 mg/dl   Fingerstick Glucose (POCT)    Collection Time: 10/24/24 11:19 AM   Result Value Ref Range    POC Glucose 116 65 - 140 mg/dl     Imaging Studies: I have personally reviewed pertinent reports and I have personally reviewed pertinent films in PACS.    EKG, Pathology, and Other Studies: I have personally reviewed pertinent reports.    VTE Pharmacologic Prophylaxis: Heparin    Dictation voice to text software has been used in the creation of this document.  Please consider this in light of any contextual or grammatical errors.

## 2024-10-24 NOTE — ASSESSMENT & PLAN NOTE
Noted to be more lethargic on exam 10/17--then improved  Am of 10/22/2024 patient confused, found naked in bed.  Appreciate neurology reassessment--appeared stable later in the day.  Of note he does have a history of cognitive impairment with prior MoCA 18 out of 30  Repeat head CT was stable  Rechecked B12 (low in past)--now normal  Appreciate psych input, no medication adjustment was required  EEG showed moderate nonspecific cerebral dysfunction with no epileptic changes  Another head CT was performed on October 23 and was stable.  Repeat MRI performed this morning, await results

## 2024-10-24 NOTE — ASSESSMENT & PLAN NOTE
Lab Results   Component Value Date    HGBA1C 8.6 (H) 10/16/2024     Recent Labs     10/23/24  1128 10/23/24  1535 10/23/24  2104 10/24/24  0740   POCGLU 96 219* 134 96   Blood Sugar Average: Last 72 hrs:  (P) [id number]  A1c reflects poor control but blood sugars have been stable here on sliding scale alone  Currently maintained on semaglutide monotherapy outpatient- hold on admission   SSI with accu checks  Hypoglycemia protocol  Diabetic diet

## 2024-10-25 ENCOUNTER — TELEPHONE (OUTPATIENT)
Dept: GASTROENTEROLOGY | Facility: AMBULARY SURGERY CENTER | Age: 59
End: 2024-10-25

## 2024-10-25 LAB
ANION GAP SERPL CALCULATED.3IONS-SCNC: 5 MMOL/L (ref 4–13)
BUN SERPL-MCNC: 41 MG/DL (ref 5–25)
CALCIUM SERPL-MCNC: 8.7 MG/DL (ref 8.4–10.2)
CHLORIDE SERPL-SCNC: 112 MMOL/L (ref 96–108)
CO2 SERPL-SCNC: 24 MMOL/L (ref 21–32)
CREAT SERPL-MCNC: 4.21 MG/DL (ref 0.6–1.3)
GFR SERPL CREATININE-BSD FRML MDRD: 14 ML/MIN/1.73SQ M
GLUCOSE SERPL-MCNC: 119 MG/DL (ref 65–140)
GLUCOSE SERPL-MCNC: 123 MG/DL (ref 65–140)
GLUCOSE SERPL-MCNC: 70 MG/DL (ref 65–140)
GLUCOSE SERPL-MCNC: 71 MG/DL (ref 65–140)
GLUCOSE SERPL-MCNC: 94 MG/DL (ref 65–140)
POTASSIUM SERPL-SCNC: 4.1 MMOL/L (ref 3.5–5.3)
SODIUM SERPL-SCNC: 141 MMOL/L (ref 135–147)

## 2024-10-25 PROCEDURE — 97530 THERAPEUTIC ACTIVITIES: CPT

## 2024-10-25 PROCEDURE — 80048 BASIC METABOLIC PNL TOTAL CA: CPT | Performed by: PHYSICIAN ASSISTANT

## 2024-10-25 PROCEDURE — 99232 SBSQ HOSP IP/OBS MODERATE 35: CPT | Performed by: PHYSICIAN ASSISTANT

## 2024-10-25 PROCEDURE — 99232 SBSQ HOSP IP/OBS MODERATE 35: CPT | Performed by: INTERNAL MEDICINE

## 2024-10-25 PROCEDURE — 92526 ORAL FUNCTION THERAPY: CPT

## 2024-10-25 PROCEDURE — 97110 THERAPEUTIC EXERCISES: CPT

## 2024-10-25 PROCEDURE — 82948 REAGENT STRIP/BLOOD GLUCOSE: CPT

## 2024-10-25 RX ORDER — HYDRALAZINE HYDROCHLORIDE 25 MG/1
25 TABLET, FILM COATED ORAL EVERY 8 HOURS SCHEDULED
Status: DISCONTINUED | OUTPATIENT
Start: 2024-10-25 | End: 2024-10-26 | Stop reason: HOSPADM

## 2024-10-25 RX ADMIN — NIFEDIPINE 90 MG: 30 TABLET, FILM COATED, EXTENDED RELEASE ORAL at 08:03

## 2024-10-25 RX ADMIN — ATORVASTATIN CALCIUM 40 MG: 40 TABLET, FILM COATED ORAL at 17:07

## 2024-10-25 RX ADMIN — BUPROPION HYDROCHLORIDE TABLETS 100 MG: 100 TABLET, FILM COATED ORAL at 17:07

## 2024-10-25 RX ADMIN — ASPIRIN 81 MG: 81 TABLET, COATED ORAL at 08:03

## 2024-10-25 RX ADMIN — HEPARIN SODIUM 7500 UNITS: 5000 INJECTION INTRAVENOUS; SUBCUTANEOUS at 11:31

## 2024-10-25 RX ADMIN — HEPARIN SODIUM 7500 UNITS: 5000 INJECTION INTRAVENOUS; SUBCUTANEOUS at 02:39

## 2024-10-25 RX ADMIN — HYDRALAZINE HYDROCHLORIDE 25 MG: 25 TABLET ORAL at 21:41

## 2024-10-25 RX ADMIN — CHLORHEXIDINE GLUCONATE 15 ML: 1.2 RINSE ORAL at 21:39

## 2024-10-25 RX ADMIN — BUPROPION HYDROCHLORIDE TABLETS 100 MG: 100 TABLET, FILM COATED ORAL at 08:04

## 2024-10-25 RX ADMIN — LABETALOL HYDROCHLORIDE 300 MG: 100 TABLET, FILM COATED ORAL at 08:04

## 2024-10-25 RX ADMIN — PANTOPRAZOLE SODIUM 40 MG: 40 TABLET, DELAYED RELEASE ORAL at 05:54

## 2024-10-25 RX ADMIN — CHLORHEXIDINE GLUCONATE 15 ML: 1.2 RINSE ORAL at 08:03

## 2024-10-25 RX ADMIN — OLANZAPINE 10 MG: 10 TABLET, FILM COATED ORAL at 21:38

## 2024-10-25 RX ADMIN — LABETALOL HYDROCHLORIDE 300 MG: 100 TABLET, FILM COATED ORAL at 17:06

## 2024-10-25 RX ADMIN — HYDRALAZINE HYDROCHLORIDE 25 MG: 25 TABLET ORAL at 14:55

## 2024-10-25 RX ADMIN — VALBENAZINE 40 MG: 40 CAPSULE ORAL at 21:41

## 2024-10-25 RX ADMIN — HEPARIN SODIUM 7500 UNITS: 5000 INJECTION INTRAVENOUS; SUBCUTANEOUS at 17:05

## 2024-10-25 RX ADMIN — CLOPIDOGREL BISULFATE 75 MG: 75 TABLET ORAL at 08:04

## 2024-10-25 NOTE — PLAN OF CARE
Problem: Neurological Deficit  Goal: Neurological status is stable or improving  Description: Interventions:  - Monitor and assess patient's level of consciousness, motor function, sensory function, and level of assistance needed for ADLs.   - Monitor and report changes from baseline. Collaborate with interdisciplinary team to initiate plan and implement interventions as ordered.   - Provide and maintain a safe environment.  - Consider seizure precautions.  - Consider fall precautions.  - Consider aspiration precautions.  - Consider bleeding precautions.  Outcome: Progressing     Problem: Activity Intolerance/Impaired Mobility  Goal: Mobility/activity is maintained at optimum level for patient  Description: Interventions:  - Assess and monitor patient  barriers to mobility and need for assistive/adaptive devices.  - Assess patient's emotional response to limitations.  - Collaborate with interdisciplinary team and initiate plans and interventions as ordered.  - Encourage independent activity per ability.  - Maintain proper body alignment.  - Perform active/passive rom as tolerated/ordered.  - Plan activities to conserve energy.  - Turn patient as appropriate  Outcome: Progressing     Problem: Communication Impairment  Goal: Ability to express needs and understand communication  Description: Assess patient's communication skills and ability to understand information.  Patient will demonstrate use of effective communication techniques, alternative methods of communication and understanding even if not able to speak.     - Encourage communication and provide alternate methods of communication as needed.  - Collaborate with case management/ for discharge needs.  - Include patient/family/caregiver in decisions related to communication.  Outcome: Progressing     Problem: Potential for Aspiration  Goal: Non-ventilated patient's risk of aspiration is minimized  Description: Assess and monitor vital signs,  respiratory status, and labs (WBC).  Monitor for signs of aspiration (tachypnea, cough, rales, wheezing, cyanosis, fever).    - Assess and monitor patient's ability to swallow.  - Place patient up in chair to eat if possible.  - HOB up at 90 degrees to eat if unable to get patient up into chair.  - Supervise patient during oral intake.   - Instruct patient/ family to take small bites.  - Instruct patient/ family to take small single sips when taking liquids.  - Follow patient-specific strategies generated by speech pathologist.  Outcome: Progressing  Goal: Ventilated patient's risk of aspiration is minimized  Description: Assess and monitor vital signs, respiratory status, airway cuff pressure, and labs (WBC).  Monitor for signs of aspiration (tachypnea, cough, rales, wheezing, cyanosis, fever).    - Elevate head of bed 30 degrees if patient has tube feeding.  - Monitor tube feeding.  Outcome: Progressing

## 2024-10-25 NOTE — ARC ADMISSION
Reviewed updates with ARC physician - concerns regarding confusion/lethargy past few days, and requesting to monitor for continued improvement. Additionally, patient needs to demonstrate improved oral intake (consuming at least 50% of each meal consistently) prior to ARC consideration. CM has been updated. Will continue to follow patient's case at this time.

## 2024-10-25 NOTE — ASSESSMENT & PLAN NOTE
History of Bipolar disorder, follows with psychiatry.  Greatly appreciate their consultation.  Mood is stable.  Okay to maintain current medication regimen  Continue Zyprexa, buproprion, trazodone  Also on Ingrezza for tardive dyskinesia

## 2024-10-25 NOTE — ASSESSMENT & PLAN NOTE
Patient with history of morbid obesity, hypertension and uncontrolled diabetes who presented with right upper and right lower extremity weakness with associated right facial droop  CT/CTA head unremarkable for acute process  TnK administered following correction of hypertension with nicardipine drip in the ICU  Following thrombolytic administration patient developed worsening slurred speech and headache, had repeat CT head which was unremarkable  Repeat head CT at 24 hours post TNK negative for signs of bleed  Neurology consult  MRI significant for left corona radiata stroke.  Placed on DAPT therapy for 21 days and then continue aspirin monotherapy indefinitely per neurology.  Serial repeat imaging during this hospitalization has remained stable  Started on Lipitor 40 mg per stroke protocol  Recommend Zio patch outpatient to evaluate for any possible arrhythmia  Will need f/u with neurovascular team in 6-8 weeks   PT/OT recommending level 1--pending acute rehab

## 2024-10-25 NOTE — PROGRESS NOTES
NEPHROLOGY HOSPITAL PROGRESS NOTE   Tommie Taylor 59 y.o. male MRN: [id number]  Unit/Bed#: S -01 Encounter: [id number]  Reason for Consult: ROSINA  Assessment & Plan  Acute kidney injury superimposed on stage 4 chronic kidney disease (HCC)  Etiology: ATN from contrast associated nephropathy (10/14) versus TMA in setting of uncontrolled hypertension  Baseline creatinine around 2.9  Creatinine on admission 3.24 on 10/14  Peak creatinine 4.57 10/21  Creatinine today 4.21 improved with hydration  UA: Microhematuria (RBC 30-50), proteinuria  UPCR: 8.4 g prior to admission  Kidney ultrasound: No hydronephrosis  No hypocomplementemia  PLA2R negative  SPEP showing abnormal distribution in gamma region, previous immunofixation showed no monoclonal immunoglobulin  Continue to monitor for renal recovery  Discontinue torsemide completely  No need for IV fluids, encourage p.o. fluid intake (discussed with nursing)  No indication for renal replacement therapy  Okay to discharge from nephrology perspective  BMP in 1 week as outpatient  Hypertensive emergency  Blood pressure currently above goal  Current Rx: Labetalol 300 mg twice daily, nifedipine 90 mg daily  Changes: Torsemide has been on hold since 10/23, keep torsemide on hold and add hydralazine 25 mg 3 times daily  CVA (cerebral vascular accident) (HCC)  Status post TNK administration  Currently on dual antiplatelet therapy with aspirin and Plavix and atorvastatin  Management per primary team and neurology  Bipolar I disorder, severe, current or most recent episode depressed, with psychotic features (HCC)  Management per primary team  SIRS (systemic inflammatory response syndrome) (HCC)  UA negative for infection  Chest x-ray: No acute cardiopulmonary disease  Lactic acid within normal limits  CT chest/abdomen/pelvis: No evidence of infectious process  Currently being monitored/observed off antibiotic    Discussed with internal medicine team.  After discussion, we  agreed that kidney function looks overall better and to stop further diuretics and use hydralazine for more blood pressure control.    SUBJECTIVE / 24H INTERVAL HISTORY:  Urine output recorded as 520 cc.  600 cc recorded from this morning.  Appetite remains low.  Denies dyspnea.  Denies leg swelling.    OBJECTIVE:  Current Weight: Weight - Scale: 119 kg (262 lb 12.6 oz)  Vitals:    10/24/24 2051 10/24/24 2157 10/25/24 0600 10/25/24 0725   BP: 131/74   152/81   BP Location: Left arm      Pulse: 67   64   Resp: 16      Temp: 98.4 °F (36.9 °C)      TempSrc: Oral      SpO2: 99% 99%  99%   Weight:   119 kg (262 lb 12.6 oz)    Height:           Intake/Output Summary (Last 24 hours) at 10/25/2024 1247  Last data filed at 10/25/2024 0814  Gross per 24 hour   Intake 981.25 ml   Output 1120 ml   Net -138.75 ml     Review of Systems   Constitutional:  Positive for appetite change. Negative for chills and fever.   HENT:  Negative for ear pain and sore throat.    Eyes:  Negative for pain and visual disturbance.   Respiratory:  Negative for cough and shortness of breath.    Cardiovascular:  Negative for chest pain and palpitations.   Gastrointestinal:  Negative for abdominal pain and vomiting.   Genitourinary:  Negative for dysuria and hematuria.   Musculoskeletal:  Negative for arthralgias and back pain.   Skin:  Negative for color change and rash.   Neurological:  Negative for seizures and syncope.   All other systems reviewed and are negative.    Physical Exam  Vitals and nursing note reviewed.   Constitutional:       General: He is not in acute distress.     Appearance: He is well-developed.   HENT:      Head: Normocephalic and atraumatic.   Eyes:      Conjunctiva/sclera: Conjunctivae normal.   Cardiovascular:      Rate and Rhythm: Normal rate and regular rhythm.      Pulses: Normal pulses.      Heart sounds: Normal heart sounds. No murmur heard.  Pulmonary:      Effort: Pulmonary effort is normal. No respiratory distress.       Breath sounds: Normal breath sounds.   Abdominal:      Palpations: Abdomen is soft.      Tenderness: There is no abdominal tenderness.   Musculoskeletal:         General: No swelling.      Cervical back: Neck supple.      Right lower leg: No edema.      Left lower leg: No edema.   Skin:     General: Skin is warm and dry.      Capillary Refill: Capillary refill takes less than 2 seconds.   Neurological:      Mental Status: He is alert. Mental status is at baseline.   Psychiatric:         Mood and Affect: Mood normal.       Medications:    Current Facility-Administered Medications:     acetaminophen (TYLENOL) tablet 650 mg, 650 mg, Oral, Q6H PRN, Macey Almeida PA-C, 650 mg at 10/17/24 1422    aspirin (ECOTRIN LOW STRENGTH) EC tablet 81 mg, 81 mg, Oral, Daily, Rose Miner MD, 81 mg at 10/25/24 0803    atorvastatin (LIPITOR) tablet 40 mg, 40 mg, Oral, QPM, Rose Miner MD, 40 mg at 10/24/24 1749    buPROPion (WELLBUTRIN) tablet 100 mg, 100 mg, Oral, BID, Rose Miner MD, 100 mg at 10/25/24 0804    calcium carbonate (TUMS) chewable tablet 1,000 mg, 1,000 mg, Oral, BID PRN, Leslie Chaparrox, CRNP, 1,000 mg at 10/22/24 2226    chlorhexidine (PERIDEX) 0.12 % oral rinse 15 mL, 15 mL, Mouth/Throat, Q12H MIGEL, Rose Miner MD, 15 mL at 10/25/24 0803    clopidogrel (PLAVIX) tablet 75 mg, 75 mg, Oral, Daily, Rose Miner MD, 75 mg at 10/25/24 0804    heparin (porcine) subcutaneous injection 7,500 Units, 7,500 Units, Subcutaneous, Q8H MIGEL, Rose Miner MD, 7,500 Units at 10/25/24 1131    hydrALAZINE (APRESOLINE) injection 10 mg, 10 mg, Intravenous, Q6H PRN, Rose Miner MD, 10 mg at 10/17/24 1430    hydrALAZINE (APRESOLINE) tablet 25 mg, 25 mg, Oral, Q8H [facility], Johny Lombardo MD    insulin lispro (HumALOG/ADMELOG) 100 units/mL subcutaneous injection 2-12 Units, 2-12 Units, Subcutaneous, TID AC, 4 Units at 10/23/24 1728 **AND** Fingerstick Glucose (POCT), , , TID AC, Rose Miner MD    labetalol (NORMODYNE) tablet 300  "mg, 300 mg, Oral, BID, Johny Lombardo MD, 300 mg at 10/25/24 0804    NIFEdipine (PROCARDIA XL) 24 hr tablet 90 mg, 90 mg, Oral, Daily, Joselyn Reyes Bahamonde, MD, 90 mg at 10/25/24 0803    OLANZapine (ZyPREXA) tablet 10 mg, 10 mg, Oral, HS, Rose Miner MD, 10 mg at 10/24/24 2123    pantoprazole (PROTONIX) EC tablet 40 mg, 40 mg, Oral, Early Morning, Rose Miner MD, 40 mg at 10/25/24 0554    polyethylene glycol (MIRALAX) packet 17 g, 17 g, Oral, Daily PRN, Rose Miner MD    traZODone (DESYREL) tablet 50 mg, 50 mg, Oral, HS PRN, Rose Miner MD, 50 mg at 10/21/24 2109    Valbenazine Tosylate CAPS 40 mg, 40 mg, Oral, HS, Rose Miner MD, 40 mg at 10/24/24 2124    Laboratory Results:  Results from last 7 days   Lab Units 10/25/24  0755 10/24/24  0526 10/23/24  0807 10/22/24  1020 10/21/24  0431 10/20/24  0809 10/20/24  0440 10/19/24  0447   WBC Thousand/uL  --   --   --   --  7.65  --  6.86 7.54   HEMOGLOBIN g/dL  --   --   --   --  10.4*  --  9.3* 10.1*   HEMATOCRIT %  --   --   --   --  31.9*  --  28.7* 30.8*   PLATELETS Thousands/uL  --   --   --   --  195  --  178 206   POTASSIUM mmol/L 4.1 4.1 4.4 4.3 4.2 4.4  --  4.7   CHLORIDE mmol/L 112* 111* 112* 111* 110* 112*  --  113*   CO2 mmol/L 24 23 23 24 22 23  --  23   BUN mg/dL 41* 43* 43* 41* 39* 36*  --  36*   CREATININE mg/dL 4.21* 4.31* 4.49* 4.32* 4.57* 4.43*  --  4.38*   CALCIUM mg/dL 8.7 8.6 9.1 8.7 8.5 8.6  --  8.5       Portions of the record may have been created with voice recognition software. Occasional wrong word or \"sound a like\" substitutions may have occurred due to the inherent limitations of voice recognition software. Read the chart carefully and recognize, using context, where substitutions have occurred.If you have any questions, please contact the dictating provider.    "

## 2024-10-25 NOTE — ASSESSMENT & PLAN NOTE
Hypertensive emergency on admission in the setting of likely CVA  Evidenced by bp 197/104->231/105 POA  S/p Cardizem drip 10/15  PTA medications: Norvasc 10 mg and labetalol 100 mg twice daily.   Appreciate nephro assistance in BP management   Discontinued Norvasc  Increased nifedipine to 90 mg daily  10/22 Increased labetolol to 300mg BID  Demadex 20 mg daily--now on hold per nephrology based on creatinine  BPs more recently are stable.  Goal is normotension now per neurology

## 2024-10-25 NOTE — ASSESSMENT & PLAN NOTE
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  This is likely contaminant and does not require further antibiotic treatment  No clear source of infection, consider possibility of aspiration given new dysphagia from stroke  CT C/A/P wo contrast given ROSINA. No evidence of acute infectious process  After patient was noted to be afebrile 24 hours and no clear source of infection,  IV cefepime and vancomycin were discontinued 10/20/24.  Continue to monitor.  resolved

## 2024-10-25 NOTE — ASSESSMENT & PLAN NOTE
Blood pressure currently above goal  Current Rx: Labetalol 300 mg twice daily, nifedipine 90 mg daily  Changes: Torsemide has been on hold since 10/23, keep torsemide on hold and add hydralazine 25 mg 3 times daily

## 2024-10-25 NOTE — PROGRESS NOTES
Progress Note - Hospitalist   Name: Tommie Taylor 59 y.o. male I MRN: [id number]  Unit/Bed#: S -01 I Date of Admission: 10/14/2024   Date of Service: 10/25/2024 I Hospital Day: 10    Assessment & Plan  CVA (cerebral vascular accident) (HCC)  Patient with history of morbid obesity, hypertension and uncontrolled diabetes who presented with right upper and right lower extremity weakness with associated right facial droop  CT/CTA head unremarkable for acute process  TnK administered following correction of hypertension with nicardipine drip in the ICU  Following thrombolytic administration patient developed worsening slurred speech and headache, had repeat CT head which was unremarkable  Repeat head CT at 24 hours post TNK negative for signs of bleed  Neurology consult  MRI significant for left corona radiata stroke.  Placed on DAPT therapy for 21 days and then continue aspirin monotherapy indefinitely per neurology.  Serial repeat imaging during this hospitalization has remained stable  Started on Lipitor 40 mg per stroke protocol  Recommend Zio patch outpatient to evaluate for any possible arrhythmia  Will need f/u with neurovascular team in 6-8 weeks   PT/OT recommending level 1--pending acute rehab  Acute kidney injury superimposed on stage 4 chronic kidney disease (HCC)  Etiology suggested to be secondary to ATN versus TMA in the setting of uncontrolled blood pressure and complicated by contrast associated nephropathy   Baseline creatinine around 2.9  Current creatinine 4.21--has been fluctuating in the mid 4 range without improvement over the course of several days  UA with microhematuria, proteinuria  US kidney and bladder --bladder wall thickening but no hydronephrosis  Bladder scan with no retention  No indication of dialysis at this time per nephrology  Avoid nephrotoxic agents  Monitor I&Os  Nephrology following, input is appreciated.  Demadex being held and IV fluids were given transiently  Will  need continued follow-up BMP as an outpatient  Encephalopathy  Noted to be more lethargic on exam 10/17--then improved  Am of 10/22/2024 patient confused, found naked in bed.  Appreciate neurology reassessment--appeared stable later in the day.  Of note he does have a history of cognitive impairment with prior MoCA 18 out of 30  Repeat head CT was stable  Rechecked B12 (low in past)--now normal  Appreciate psych input, no medication adjustment was required  EEG showed moderate nonspecific cerebral dysfunction with no epileptic changes  Serial imaging unchanged    Hypertensive emergency  Hypertensive emergency on admission in the setting of likely CVA  Evidenced by bp 197/104->231/105 POA  S/p Cardizem drip 10/15  PTA medications: Norvasc 10 mg and labetalol 100 mg twice daily.   Appreciate nephro assistance in BP management   Discontinued Norvasc  Increased nifedipine to 90 mg daily  10/22 Increased labetolol to 300mg BID  Demadex 20 mg daily--now on hold per nephrology based on creatinine  BPs more recently are stable.  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  This is likely contaminant and does not require further antibiotic treatment  No clear source of infection, consider possibility of aspiration given new dysphagia from stroke  CT C/A/P wo contrast given ROSINA. No evidence of acute infectious process  After patient was noted to be afebrile 24 hours and no clear source of infection,  IV cefepime and vancomycin were discontinued 10/20/24.  Continue to monitor.  resolved    Bipolar I disorder, severe, current or most recent episode depressed, with psychotic  features ([facility])  History of Bipolar disorder, follows with psychiatry.  Greatly appreciate their consultation.  Mood is stable.  Okay to maintain current medication regimen  Continue Zyprexa, buproprion, trazodone  Also on Ingrezza for tardive dyskinesia  DM2 (diabetes mellitus, type 2) ([facility])  Lab Results   Component Value Date    HGBA1C 8.6 (H) 10/16/2024     Recent Labs     10/24/24  1119 10/24/24  1638 10/24/24  2056 10/25/24  0726   POCGLU 116 108 97 71   Blood Sugar Average: Last 72 hrs:  (P) 108.5  A1c reflects poor control but blood sugars have been stable here on sliding scale alone  Currently maintained on semaglutide monotherapy outpatient- holding  SSI with accu checks  Hypoglycemia protocol  Diabetic diet    VTE Pharmacologic Prophylaxis:   lovenox    Mobility:   Basic Mobility Inpatient Raw Score: 11  JH-HLM Goal: 4: Move to chair/commode  JH-HLM Achieved: 4: Move to chair/commode  JH-HLM Goal achieved. Continue to encourage appropriate mobility.    Patient Centered Rounds: I performed bedside rounds with nursing staff today.   Discussions with Specialists or Other Care Team Provider: case mgmt, nephrology    Education and Discussions with Family / Patient: 2:45 pm updated wife on plan    Current Length of Stay: 10 day(s)  Current Patient Status: Inpatient   Certification Statement: The patient will continue to require additional inpatient hospital stay due to pending acceptance to rehab  Discharge Plan:  Patient is stable for discharge to rehab.  Awaiting acceptance .  Addendum 2:30 pm spoke with ARC team, case mgmt and my attending.  Plan is for discharge tomorrow afternoon to [facility] acute [facility]      Code Status: Level 1 - Full Code    Subjective   No events or concerns.  Observed patient working with PT this morning and noted that he was overall doing better cognitively    Objective :  Temp:  [98.2 °F (36.8 °C)-98.4 °F (36.9 °C)] 98.4 °F (36.9 °C)  HR:  [64-74] 64  BP: (124-153)/(69-81)  152/81  Resp:  [16] 16  SpO2:  [99 %] 99 %    Body mass index is 42.42 kg/m².     Input and Output Summary (last 24 hours):     Intake/Output Summary (Last 24 hours) at 10/25/2024 0934  Last data filed at 10/25/2024 0814  Gross per 24 hour   Intake 981.25 ml   Output 1120 ml   Net -138.75 ml       Physical Exam  Vitals reviewed.   Constitutional:       General: He is not in acute distress.     Appearance: He is obese. He is not ill-appearing, toxic-appearing or diaphoretic.      Comments: Patient seen transferring from bed to chair with walker and PT assistance.   Eyes:      General: No scleral icterus.        Right eye: No discharge.         Left eye: No discharge.      Conjunctiva/sclera: Conjunctivae normal.   Cardiovascular:      Rate and Rhythm: Normal rate and regular rhythm.      Heart sounds: No murmur heard.  Pulmonary:      Effort: No respiratory distress.      Breath sounds: No stridor. No wheezing, rhonchi or rales.   Abdominal:      General: There is no distension.      Tenderness: There is no guarding.   Musculoskeletal:      Right lower leg: No edema.      Left lower leg: No edema.   Skin:     General: Skin is warm and dry.      Coloration: Skin is not jaundiced or pale.      Findings: No bruising, erythema, lesion or rash.   Neurological:      Mental Status: He is alert.      Comments: Much more awake alert and interactive today.  Still with dysarthria   Psychiatric:         Mood and Affect: Mood normal.         Lines/Drains:        Lab Results: I have reviewed the following results:   Results from last 7 days   Lab Units 10/21/24  0431   WBC Thousand/uL 7.65   HEMOGLOBIN g/dL 10.4*   HEMATOCRIT % 31.9*   PLATELETS Thousands/uL 195     Results from last 7 days   Lab Units 10/25/24  0755   SODIUM mmol/L 141   POTASSIUM mmol/L 4.1   CHLORIDE mmol/L 112*   CO2 mmol/L 24   BUN mg/dL 41*   CREATININE mg/dL 4.21*   ANION GAP mmol/L 5   CALCIUM mg/dL 8.7   GLUCOSE RANDOM mg/dL 70         Results from last 7  days   Lab Units 10/25/24  0726 10/24/24  2056 10/24/24  1638 10/24/24  1119 10/24/24  0740 10/23/24  2104 10/23/24  1535 10/23/24  1128 10/23/24  0746 10/22/24  2209 10/22/24  1119 10/22/24  0827   POC GLUCOSE mg/dl 71 97 108 116 96 134 219* 96 76 93 114 82         Results from last 7 days   Lab Units 10/20/24  0440 10/19/24  0447   PROCALCITONIN ng/ml 0.26* 0.29*       Recent Cultures (last 7 days):               Last 24 Hours Medication List:     Current Facility-Administered Medications:     acetaminophen (TYLENOL) tablet 650 mg, Q6H PRN    aspirin (ECOTRIN LOW STRENGTH) EC tablet 81 mg, Daily    atorvastatin (LIPITOR) tablet 40 mg, QPM    buPROPion (WELLBUTRIN) tablet 100 mg, BID    calcium carbonate (TUMS) chewable tablet 1,000 mg, BID PRN    chlorhexidine (PERIDEX) 0.12 % oral rinse 15 mL, Q12H MIGEL    clopidogrel (PLAVIX) tablet 75 mg, Daily    heparin (porcine) subcutaneous injection 7,500 Units, Q8H MIGEL    hydrALAZINE (APRESOLINE) injection 10 mg, Q6H PRN    insulin lispro (HumALOG/ADMELOG) 100 units/mL subcutaneous injection 2-12 Units, TID AC **AND** Fingerstick Glucose (POCT), TID AC    labetalol (NORMODYNE) tablet 300 mg, BID    NIFEdipine (PROCARDIA XL) 24 hr tablet 90 mg, Daily    OLANZapine (ZyPREXA) tablet 10 mg, HS    pantoprazole (PROTONIX) EC tablet 40 mg, Early Morning    polyethylene glycol (MIRALAX) packet 17 g, Daily PRN    [Held by provider] torsemide (DEMADEX) tablet 20 mg, Daily    traZODone (DESYREL) tablet 50 mg, HS PRN    Valbenazine Tosylate CAPS 40 mg, HS    Administrative Statements   Today, Patient Was Seen By: Martine Lay PA-C      **Please Note: This note may have been constructed using a voice recognition system.**

## 2024-10-25 NOTE — PHYSICAL THERAPY NOTE
PHYSICAL THERAPY NOTE          Patient Name: Tommie Taylor  Today's Date: 10/25/2024           10/25/24 0836   PT Last Visit   PT Visit Date 10/25/24   Note Type   Note Type Treatment   Pain Assessment   Pain Assessment Tool 0-10   Pain Score No Pain   Restrictions/Precautions   Weight Bearing Precautions Per Order No   Other Precautions Cognitive;Chair Alarm;Bed Alarm;Fall Risk;Pain   General   Chart Reviewed Yes   Response to Previous Treatment Patient with no complaints from previous session.   Family/Caregiver Present No   Cognition   Overall Cognitive Status Impaired   Arousal/Participation Alert;Responsive;Cooperative   Attention Attends with cues to redirect   Orientation Level Oriented X4   Memory Decreased short term memory;Decreased recall of recent events;Decreased recall of precautions   Following Commands Follows one step commands with increased time or repetition   Comments pt was pleasant and cooperative throughout PT intervention   Subjective   Subjective pt was agreeable to participate in PT intervention and stated no pain pre/post tx session   Bed Mobility   Supine to Sit 4  Minimal assistance   Additional items Assist x 1;HOB elevated;Bedrails;Increased time required;Verbal cues;Other  (HHA, pt was able to advance bilateral LE's off EOB and only require HHA to pull up into a seated EOB position)   Sit to Supine Unable to assess   Additional Comments pt seated OOB in the recliner post tx session with call bell, chair alarm activated, set up for breakfast and all pt needs met   Transfers   Sit to Stand 2  Maximal assistance   Additional items Assist x 1;Increased time required;Verbal cues   Stand to Sit 2  Maximal assistance   Additional items Assist x 1;Armrests;Increased time required;Verbal cues   Stand pivot 2  Maximal assistance   Additional items Assist x 1;Increased time required;Verbal cues   Additional  Comments pt continues to require max Ax1 for all functional transfers towards his L side while utilizing SBQC in L UE   Ambulation/Elevation   Gait pattern Not appropriate   Balance   Static Sitting Fair -   Dynamic Sitting Poor +  (in the recliner)   Static Standing Poor -   Dynamic Standing Poor -   Ambulatory Zero   Endurance Deficit   Endurance Deficit Yes   Endurance Deficit Description limited functional mobility, functional transfers and activity tolerance   Activity Tolerance   Activity Tolerance Patient limited by fatigue   Nurse Made Aware Spoke to RN Makayla Sheikh   Hip Flexion Sitting;15 reps;AAROM;PROM;Right   Hip Abduction Sitting;15 reps;AROM;Bilateral   Hip Adduction Sitting;15 reps;AROM;Bilateral  (pillow squeezes)   Knee AROM Long Arc Quad Sitting;10 reps;AROM;Right   Ankle Pumps Sitting;20 reps;AROM;Bilateral   Assessment   Prognosis Good   Problem List Decreased strength;Decreased range of motion;Decreased endurance;Impaired balance;Decreased mobility;Decreased coordination;Decreased safety awareness;Obesity   Assessment pt began tx session lying supine in the bed as pt was motivated and agreeable to participate in PT intervention. PT to focus on bed mobility, transfer training, TE activities and increasing pt activity tolerance. In comparison to previous tx session pt demonstrated progress with bed mobility as pt was able to advance bilateral LE's off EOB and only required min Ax1 HHA in order to pull up to a seated EOB position. pt was able to sit EOB 5 minutes w/o LOB while being educated on functional transfers with SBQC towards his L side. pt continues to remain consistant for requiring max ax1 for all functional transfers in todays tx session. While seated in the recliner pt was able to participate in TE activities of RLE with AROM except for seated marches which required AAROM/PROM due to fatigue and weakness. Post tx pt continues to be limited and not performing at his baseline level.  pt remains at an increased risk for falls and injuries and would benefit from continued skilled PT intervention. Continue to recommend DC w/ level 1 maximal rehab resource intensity when medically cleared   Goals   Patient Goals to get moving more   STG Expiration Date 10/29/24   PT Treatment Day 5   Plan   Treatment/Interventions Functional transfer training;LE strengthening/ROM;Therapeutic exercise;Endurance training;Patient/family training;Equipment eval/education;Bed mobility;Gait training;Compensatory technique education;Spoke to nursing   Progress Slow progress, cognitive deficits   PT Frequency 4-6x/wk   Discharge Recommendation   Rehab Resource Intensity Level, PT I (Maximum Resource Intensity)   AM-PAC Basic Mobility Inpatient   Turning in Flat Bed Without Bedrails 2   Lying on Back to Sitting on Edge of Flat Bed Without Bedrails 3   Moving Bed to Chair 2   Standing Up From Chair Using Arms 2   Walk in Room 1   Climb 3-5 Stairs With Railing 1   Basic Mobility Inpatient Raw Score 11   Basic Mobility Standardized Score 30.25   [facility] Highest Level Of Mobility   -[facility] Goal 4: Move to chair/commode   -[facility] Achieved 4: Move to chair/commode   Education   Education Provided Other  (bed mobility, transfer training, TE activities)   Patient Reinforcement needed   End of Consult   Patient Position at End of Consult Bedside chair;Bed/Chair alarm activated;All needs within reach   The patient's AM-PAC Basic Mobility Inpatient Short Form Raw Score is 11. A Raw score of less than or equal to 16 suggests the patient may benefit from discharge to post-acute rehabilitation services. Please also refer to the recommendation of the Physical Therapist for safe discharge planning.    Lisandro Bell

## 2024-10-25 NOTE — ASSESSMENT & PLAN NOTE
Noted to be more lethargic on exam 10/17--then improved  Am of 10/22/2024 patient confused, found naked in bed.  Appreciate neurology reassessment--appeared stable later in the day.  Of note he does have a history of cognitive impairment with prior MoCA 18 out of 30  Repeat head CT was stable  Rechecked B12 (low in past)--now normal  Appreciate psych input, no medication adjustment was required  EEG showed moderate nonspecific cerebral dysfunction with no epileptic changes  Serial imaging unchanged

## 2024-10-25 NOTE — PROGRESS NOTES
PHYSICAL MEDICINE AND REHABILITATION   PREADMISSION ASSESSMENT     Projected IGC and Rehabilitation Diagnoses:  Impairment of mobility, safety, Activities of Daily Living (ADLs), and cognitive/communication skills due to Stroke:  01.2  Right Body Involvement (Left Brain)  Etiologic Dx: Left Corona Radiata Infarct  Date of Onset: 10/14/2024   Date of surgery: N/A    PATIENT INFORMATION  Name: Tommie Taylor Phone #: [phone] (home)   [place] [facility] [place] [address]  YOB: 1965 Age: 59 y.o. #   Marital Status: /Civil Union  Ethnicity:   Employment Status:  unemployed  Extended Emergency Contact Information  Primary Emergency Contact: Marie Sevilla  Address: [facility] [address] [address] [facility]  Home Phone: [phone]  Mobile Phone: [phone]  Relation: Spouse  Advance Directive: Level 1 Full Code - unknown advanced directive    INSURANCE/COVERAGE:     Primary Payor: MEDICARE / Plan: MEDICARE A AND B / Product Type: Medicare A & B Fee for Service /   Secondary Payer: [facility]   Payer Contact:  Payer Contact:   Contact Phone:  Contact Phone:     MEDICARE #: [id number]   Medicare Days: 60/30/60  Medical Record #: [id number]    REFERRAL SOURCE:   Referring provider: Ale Saba*  Referring facility: [facility]  Room: S /S -01  PCP: No primary care provider on file. PCP phone number: None    MEDICAL INFORMATION  HPI: Patient is a 59 year old male that presented to [facility] on 10/14/2024 via EMS as prehospital stroke alert d/t fall following episode of dizziness and sudden onset right sided weakness. NIHSS of 8, and was administered TNK after BP controlled s/p IV Labetalol and Cardene gtt. CT brain was negative. CTA H/N was unremarkable. Neurology was consulted, and patient was admitted to stroke pathway. Following TNK,  patient developed worsening dysarthria and headache. Repeat CTH was unremarkable. Statin dose was increased. Patient underwent SLP evaluation, which showed mild-moderate oropharyngeal dysphagia, and he was recommended for Dysphagia 1 pureed diet with Nectar thick liquids, aspiration precautions. 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 ECHO showed EF 70%, moderate concentric hypertrophy, grade 2 diastolic dysfunction, mild AVR. Per Neurology, plan for DAPT (ASA and Plavix) for 21 days, then ASA monotherapy. Recommended for outpatient Ziopatch/Holter monitor at D/C. CVA likely d/t uncontrolled HTN, working towards goal normotension. Nephrology was consulted re: ROSINA, and patient was initiated on PO diuretics. Renal US and CT C/A/P were both negative for acute findings. Patient's creatinine levels remain increased, however appears to be plateauing per Nephrology. On 10/22, patient with confusion in AM. Repeat CTH was stable, and EEG was negative for seizure activity. Psychiatry was consulted re: medications, given history of ADHD, bipolar disorder and tardive dyskinesis, with no changes made at this time. On 10/23, Torsemide was held, and patient was administered IV fluids d/t increase in creat level. Patient again with noted lethargy and unable to maintain conversation. Repeat MRI brain was stable. Per Neurology, concern confusion/lethargy likely r/t untreated sleep apnea and delirium in setting of cog impairment at baseline, with no further recommendations/workup at this time. Patient was initiated on CPAP at . *** Patient is overall hemodynamically stable and medically cleared for discharge to [facility]. PT/OT/ST therapies and PM&R were consulted, and they are recommending patient for inpatient Acute Rehab. He has demonstrated that he can tolerate and participate in 3 hours of therapy per day.    Received both Pfizer vaccines.    Past Medical History:   Past Surgical History:   Allergies:     Past Medical  History:   Diagnosis Date    ADHD, adult residual type     Anxiety     Anxiety     Bipolar 1 disorder (HCC)     Cataract     Left eye    Chronic kidney disease     Colon polyp     CPAP (continuous positive airway pressure) dependence     Depression     Depression     Diabetes mellitus (HCC)     blood sugar 167 on admission    Equinus deformity of foot     GERD (gastroesophageal reflux disease)     Homicidal ideations     Hyperlipidemia     Hypertension     Microalbuminuria     Morbid obesity (HCC)     Neuropathy     Shortness of breath     Sleep apnea     CPAP at bedtime    Sleep apnea     Stroke (HCC)     Suicidal intent     Past Surgical History:   Procedure Laterality Date    CATARACT EXTRACTION      CATARACT EXTRACTION      COLONOSCOPY      HAND SURGERY Right     OTHER SURGICAL HISTORY      REPAIR OF SUPERFICIAL WOUND FACE    VA ESOPHAGOGASTRODUODENOSCOPY TRANSORAL DIAGNOSTIC N/A 06/14/2018    Procedure: ESOPHAGOGASTRODUODENOSCOPY (EGD);  Surgeon: Yinka Maier MD;  Location: [facility];  Service: Gastroenterology    VA ESOPHAGOGASTRODUODENOSCOPY TRANSORAL DIAGNOSTIC N/A 09/12/2018    Procedure: EGD AND COLONOSCOPY;  Surgeon: Yinka Maier MD;  Location: [facility];  Service: Gastroenterology     Allergies   Allergen Reactions    Pollen Extract Nasal Congestion    Tetanus Toxoid Swelling         Medical/functional conditions requiring inpatient rehabilitation: left corona radiata infarct, vertigo, right sided weakness, right facial droop, dysarthria, dysphagia, encephalopathy, HTN, anemia, DM, sleep apnea with CPAP, impaired mobility and self care, impaired cognition, decreased strength/endurance, impaired balance, decreased safety awareness     Risk for medical/clinical complications: risk for falls, risk for skin breakdown, risk for aspiration, risk for infection, risk for DVT/PE, risk for respiratory distress, risk for additional neurologic event, risk for hypo/hypertensive episodes, risk for hypo/hyperglycemia  episodes    Comorbidities/Surgeries in the last 100 days:  ADHD, anxiety, bipolar disorder, CKD4, CVA, tardive dyskinesia, depression, DM, GERD, HLD, HTN, neuropathy, sleep apnea with CPAP    CURRENT VITAL SIGNS:   Temp:  [98.2 °F (36.8 °C)-98.7 °F (37.1 °C)] 98.7 °F (37.1 °C)  HR:  [64-74] 68  BP: (124-153)/(69-81) 132/71  Resp:  [16] 16  SpO2:  [99 %] 99 %   Intake/Output Summary (Last 24 hours) at 10/25/2024 1513  Last data filed at 10/25/2024 1300  Gross per 24 hour   Intake 981.25 ml   Output 1170 ml   Net -188.75 ml        LABORATORY RESULTS:      Lab Results   Component Value Date    HGB 10.4 (L) 10/21/2024    HGB 12.1 12/15/2015    HCT 31.9 (L) 10/21/2024    HCT 35.6 (L) 12/15/2015    WBC 7.65 10/21/2024    WBC 6.44 12/15/2015     Lab Results   Component Value Date    BUN 41 (H) 10/25/2024    BUN 6 12/15/2015     12/15/2015    K 4.1 10/25/2024    K Unable to analyze due to hemolysis 12/15/2015     (H) 10/25/2024     12/15/2015    GLUCOSE 315 (H) 12/15/2015    CREATININE 4.21 (H) 10/25/2024    CREATININE 1.16 12/15/2015     Lab Results   Component Value Date    PROTIME 15.2 (H) 10/17/2024    INR 1.13 10/17/2024        DIAGNOSTIC STUDIES:  CT chest abdomen pelvis wo contrast    Result Date: 10/20/2024  Impression: No acute findings in the chest, abdomen, or pelvis to explain the patient's fevers. Increased density within the gallbladder likely vicarious excretion of contrast in this patient with recent previous contrast administration. Resident: MARTI SANCHEZ I, the attending radiologist, have reviewed the images and agree with the final report above. Workstation performed: [id number]     US kidney and bladder    Result Date: 10/19/2024  Impression: No hydronephrosis. Mild diffuse bladder wall thickening should be correlated with urinalysis. Workstation performed: [id number]     XR chest portable    Result Date: 10/18/2024  Impression: No acute cardiopulmonary disease. Workstation performed:  [id number]     CT head wo contrast    Result Date: 10/17/2024  Impression: No acute intracranial abnormality.  Chronic microangiopathic changes. Left corona radiata infarction as previously characterized. Workstation performed: [id number]     MRI brain wo contrast    Result Date: 10/16/2024  Impression: Acute left corona radiata infarct. Other nonemergent findings above. Workstation performed: [id number]     CT head wo contrast    Result Date: 10/16/2024  Impression: No acute intracranial hemorrhage. No significant interval change. Consider MRI with diffusion weighted imaging for further evaluation, if there are no contraindications. Workstation performed: [id number]     CT head without contrast    Result Date: 10/15/2024  Impression: No acute intracranial abnormality. Workstation performed: [id number]     CT stroke alert brain    Result Date: 10/14/2024  Impression: No acute intracranial abnormality.  Stable chronic microangiopathic changes within the brain. Findings were directly discussed with Renae Albert at 11:18 p.m on 10/14/2024. Workstation performed: [id number]     CTA stroke alert (head/neck)    Result Date: 10/14/2024  Impression: Unremarkable CTA neck and brain. Findings were directly discussed with Renae Albert at 11:18 p.m on 10/14/2024. Workstation performed: [id number]     Colonoscopy    Result Date: 10/14/2024  Impression: Incomplete procedure due to inadequate bowel prep.  Solid balls of stool mixed with thick brown liquid noted in the rectum therefore the exam was terminated. RECOMMENDATION: Repeat colonoscopy in 3 months, due: 1/12/2025 Inadequate bowel preparation Incomplete procedure  Recommend 2-day bowel prep for future procedure. Trulicity will need to be held prior to the procedure. Will need to avoid fibrous materials 5 days prior to repeat colonoscopy.  Ailyn Steen MD     EGD    Result Date: 10/14/2024  Impression: Grade B esophagitis in the lower third of the esophagus Irregular  "Z-line; performed cold forceps biopsy The duodenal bulb, 1st part of the duodenum and 2nd part of the duodenum appeared normal. Performed random biopsy to rule out celiac disease. Mild erythematous mucosa in the prepyloric region; performed cold forceps biopsy 2 cm type I hiatal hernia RECOMMENDATION: Await pathology results Follow biopsy results in 2 weeks. Recommend starting pantoprazole 40 mg on daily basis and continue this indefinitely if biopsies confirm Tolbert's esophagus. Avoid NSAIDs. Follow GERD diet.   Ailyn Steen MD       PRECAUTIONS/SPECIAL NEEDS:  Tobacco:   Social History     Tobacco Use   Smoking Status Former    Current packs/day: 0.00    Types: Cigarettes    Quit date:     Years since quittin.8   Smokeless Tobacco Current    Types: Chew   Tobacco Comments    pt \"Quit after approx over 25 years ago\"   , Alcohol:    Social History     Substance and Sexual Activity   Alcohol Use Not Currently    Comment: rarely   , Anticoagulation:  aspirin 81 mg orally every day, clopidogrel 75 mg orally every day, and heparin, Blood Sugar Management: as per MD recommendations, Edema Management, Safety Concerns, Pain Management, Aspiration Risk/Precautions, Dietary Restrictions: Diabetic diet with thin liquids, CPAP @ HS, Language Preference: English, and Fall Precautions.    MEDICATIONS:     Current Facility-Administered Medications:     acetaminophen (TYLENOL) tablet 650 mg, 650 mg, Oral, Q6H PRN, Macey Almeida PA-C, 650 mg at 10/17/24 1422    aspirin (ECOTRIN LOW STRENGTH) EC tablet 81 mg, 81 mg, Oral, Daily, Rose Miner MD, 81 mg at 10/25/24 0803    atorvastatin (LIPITOR) tablet 40 mg, 40 mg, Oral, QPM, Rose Miner MD, 40 mg at 10/24/24 1749    buPROPion (WELLBUTRIN) tablet 100 mg, 100 mg, Oral, BID, Rose Miner MD, 100 mg at 10/25/24 0804    calcium carbonate (TUMS) chewable tablet 1,000 mg, 1,000 mg, Oral, BID PRN, SAMANTHA Gomez, 1,000 mg at 10/22/24 2226    chlorhexidine " (PERIDEX) 0.12 % oral rinse 15 mL, 15 mL, Mouth/Throat, Q12H MIGEL, Rose Miner MD, 15 mL at 10/25/24 0803    clopidogrel (PLAVIX) tablet 75 mg, 75 mg, Oral, Daily, Rose Miner MD, 75 mg at 10/25/24 0804    heparin (porcine) subcutaneous injection 7,500 Units, 7,500 Units, Subcutaneous, Q8H MIGEL, Rose Miner MD, 7,500 Units at 10/25/24 1131    hydrALAZINE (APRESOLINE) injection 10 mg, 10 mg, Intravenous, Q6H PRN, Rose Miner MD, 10 mg at 10/17/24 1430    hydrALAZINE (APRESOLINE) tablet 25 mg, 25 mg, Oral, Q8H MIGEL, Johny Lombardo MD, 25 mg at 10/25/24 1455    insulin lispro (HumALOG/ADMELOG) 100 units/mL subcutaneous injection 2-12 Units, 2-12 Units, Subcutaneous, TID AC, 4 Units at 10/23/24 1728 **AND** Fingerstick Glucose (POCT), , , TID AC, Rose Miner MD    labetalol (NORMODYNE) tablet 300 mg, 300 mg, Oral, BID, Johny Lombardo MD, 300 mg at 10/25/24 0804    NIFEdipine (PROCARDIA XL) 24 hr tablet 90 mg, 90 mg, Oral, Daily, Joselyn Reyes Bahamonde, MD, 90 mg at 10/25/24 0803    OLANZapine (ZyPREXA) tablet 10 mg, 10 mg, Oral, HS, Rose Miner MD, 10 mg at 10/24/24 2123    pantoprazole (PROTONIX) EC tablet 40 mg, 40 mg, Oral, Early Morning, Rose Miner MD, 40 mg at 10/25/24 0554    polyethylene glycol (MIRALAX) packet 17 g, 17 g, Oral, Daily PRN, Rose Miner MD    traZODone (DESYREL) tablet 50 mg, 50 mg, Oral, HS PRN, Rose Miner MD, 50 mg at 10/21/24 2109    Valbenazine Tosylate CAPS 40 mg, 40 mg, Oral, HS, Rose Miner MD, 40 mg at 10/24/24 2124    SKIN INTEGRITY:   no rashes, no erythema, no peripheral edema    PRIOR LEVEL OF FUNCTION:  He lives in a(n) single family home  Tommie Taylor is  and lives with their family (spouse and 15 year old niece).  Self Care: Independent, Indoor Mobility: Independent, Stairs (in/outdoor): Independent, and Cognition: Independent  Prior to patient's admission, patient was fully Independent with ADLs and received assistance with IADLs. Spouse provides  transportation. He was Independent without use of AD for mobility.    FALLS IN THE LAST 6 MONTHS: 1 to 4    HOME ENVIRONMENT:  The living area: can live on one level with 1st floor bathroom and couch.  There are 2 steps vs ramp to enter the home, with full flight of stairs to 2nd floor of home.    The patient will not have 24 hour supervision/physical assistance available upon discharge.  Wife is primary support, but is limited in how much physical A she can provide. There is a 15 Y.O. niece who is also present, however, no one else can regularly assist.     PREVIOUS DME:  Equipment in home (previous DME): Shower Chair and Grab Bars    FUNCTIONAL STATUS:  Physical Therapy Occupational Therapy Speech Therapy   *** *** 10/25/2024, per SLP    Subjective:  Pt seen for dysphagia tx follow up lunch. Pt more awake and alert. Pt on dysphagia 3 diet w/ NTL.      Objective:  Pt did not like lunch entree- stuffed shells and broccoli. Pt offered PB&J sand and given thin water by cup and straw. Pt took bites, exhibited good mastication/manipulation. Pocketing noted on R, needed reminders to clear. Pt used lingual sweeps to clear pocketing. Pt took sips of thin liquids w/ good oral control, including with food in his mouth. Swallows appeared timely, with no overt s/s aspiration.      Assessment:  Pt w/ improved swallowing skills, cont w/ pocketing on R, but able to clear w/ min cues.   No overt s/s aspiration with thin liquids by cup and straw.      Plan/Recommendations:  Rec advance to regular diet w/ thin liquids  Cue pt for pocketing  as needed- pt stated he knows he needs to make sure his mouth clear at end of meal.   Meds as tolerated   Will cont to follow     CARE SCORES:  Self Care:  Eating: {ARC Pre Admission Screenin}  Oral hygiene: {ARC Pre Admission Screenin}  Toilet hygiene: {ARC Pre Admission Screenin}  Shower/bathing self: {ARC Pre Admission Screenin}  Upper body dressing: {ARC Pre  Admission Screenin}  Lower body dressing: {ARC Pre Admission Screenin}  Putting on/taking off footwear: {ARC Pre Admission Screenin}  Transfers:  Roll left and right: {ARC Pre Admission Screenin}  Sit to lying: {ARC Pre Admission Screenin}  Lying to sitting on side of bed: {ARC Pre Admission Screenin}  Sit to stand: {ARC Pre Admission Screenin}  Chair/bed to chair transfer: {ARC Pre Admission Screenin}  Toilet transfer: {ARC Pre Admission Screenin}  Mobility:  Walk 10 ft: {ARC Pre Admission Screenin}  Walk 50 ft with two turns: {ARC Pre Admission Screenin}  Walk 150ft: {ARC Pre Admission Screenin}    CURRENT GAP IN FUNCTION  Prior to Admission: Functional Status: Patient was independent with mobility/ambulation, transfers, ADL's, IADL's.    Expected functional outcomes: It is expected that with skilled acute rehabilitation services the patient will progress to Supervision for self care and Supervision for mobility     Estimated length of stay: 2 - 3 weeks    Anticipated Post-Discharge Disposition/Treatment  Disposition: Return to previous home/apartment.  Outpatient Services: Physical Therapy (PT), Occupational Therapy (OT), and Speech Therapy    BARRIERS TO DISCHARGE  Weakness, Pain, Diminished cognition/Mentation change, Balance Difficulty, Fatigue, Home Accessibility, Caregiver Accessibility, Financial Resources, Equipment Needs, and Resource Availability    INTERVENTIONS FOR DISCHARGE  Adaptive equipment, Patient/Family/Caregiver Education, Community Resources, Support Group, Financial Assistance, Arrange DME needs, Medication Changes as per MD recommendations, Therapy exercises, Center of balance support , and Energy conservation education     REQUIRED THERAPY:  Patient will require PT, OT and ST 60 minutes each per day, five days per week to achieve rehab goals.     REQUIRED FUNCTIONAL AND MEDICAL MANAGEMENT FOR INPATIENT  REHABILITATION:  Skin:  There are no pressure sores currently, Pain Management: Overall pain is well controlled, Deep Vein Thrombosis (DVT) Prophylaxis:  heparin and ASA and Plavix, Diabetes Management: continue sliding scale insulin, patient to do finger sticks as ordered, SLIM to continue to manage diabetes, and further IM management of additional medical conditions while on ARC, he needs PT/OT/ST intervention, patient/family education and training, possible Neuropsych and Neurology consults with any other needed consults prn, nursing medication review and management of bowel/bladder function. Patient/family can participate in unit based stroke education while on ARC.    RECOMMENDED LEVEL OF CARE:   Patient is a 59 year old male that presented [facility] on [facility]14[facility]2024 via EMS as prehospital stroke alert d/t fall following episode of dizziness and sudden onset right sided weakness. NIHSS of 8, and was administered TNK after BP controlled s/p IV Labetalol and Cardene gtt. CT brain was negative. CTA H/N was unremarkable. Neurology was consulted, and patient was admitted to stroke pathway. Following TNK, patient developed worsening dysarthria and headache. Repeat CTH was unremarkable. Statin dose was increased. Patient underwent SLP evaluation, which showed mild-moderate oropharyngeal dysphagia, and he was recommended for Dysphagia 1 pureed diet with Nectar thick liquids, aspiration precautions. 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 ECHO showed EF 70%, moderate concentric hypertrophy, grade 2 diastolic dysfunction, mild AVR. Per Neurology, plan for DAPT (ASA and Plavix) for 21 days, then ASA monotherapy. Recommended for outpatient Ziopatch/Holter monitor at D/C. CVA likely d/t uncontrolled HTN, working towards goal normotension. Nephrology was consulted re: ROSINA, and patient was initiated on PO diuretics. Renal US and CT C/A/P were both negative for acute findings. Patient's creatinine  levels remain increased, however appears to be plateauing per Nephrology. On 10/22, patient with confusion in AM. Repeat CTH was stable, and EEG was negative for seizure activity. Psychiatry was consulted re: medications, given history of ADHD, bipolar disorder and tardive dyskinesis, with no changes made at this time. On 10/23, Torsemide was held, and patient was administered IV fluids d/t increase in creat level. Patient again with noted lethargy and unable to maintain conversation. Repeat MRI brain was stable. Per Neurology, concern confusion/lethargy likely r/t untreated sleep apnea and delirium in setting of cog impairment at baseline, with no further recommendations/workup at this time. Patient was initiated on CPAP at . *** Prior to patient's admission, patient was fully Independent with ADLs and received assistance with IADLs. Spouse provides transportation. He was Independent without use of AD for mobility. Currently, patient is ***. Close medical management and PM&R management is recommended at this time while patient is on the ARC. Inpatient acute rehab is recommended for patient to maximize overall strength and mobility upon discharge to home with support of family.

## 2024-10-25 NOTE — PLAN OF CARE
Problem: PHYSICAL THERAPY ADULT  Goal: Performs mobility at highest level of function for planned discharge setting.  See evaluation for individualized goals.  Description: Treatment/Interventions: Functional transfer training, LE strengthening/ROM, Elevations, Therapeutic exercise, Endurance training, Patient/family training, Equipment eval/education, Bed mobility, Gait training, Compensatory technique education, Spoke to nursing, Spoke to case management    Equipment Recommended: Other (Comment) (TBD pending progress)     See flowsheet documentation for full assessment, interventions and recommendations.  Outcome: Progressing  Note: Prognosis: Good  Problem List: Decreased strength, Decreased range of motion, Decreased endurance, Impaired balance, Decreased mobility, Decreased coordination, Decreased safety awareness, Obesity  Assessment: pt began tx session lying supine in the bed as pt was motivated and agreeable to participate in PT intervention. PT to focus on bed mobility, transfer training, TE activities and increasing pt activity tolerance. In comparison to previous tx session pt demonstrated progress with bed mobility as pt was able to advance bilateral LE's off EOB and only required min Ax1 HHA in order to pull up to a seated EOB position. pt was able to sit EOB 5 minutes w/o LOB while being educated on functional transfers with SBQC towards his L side. pt continues to remain consistant for requiring max ax1 for all functional transfers in todays tx session. While seated in the recliner pt was able to participate in TE activities of RLE with AROM except for seated marches which required AAROM/PROM due to fatigue and weakness. Post tx pt continues to be limited and not performing at his baseline level. pt remains at an increased risk for falls and injuries and would benefit from continued skilled PT intervention. Continue to recommend DC w/ level 1 maximal rehab resource intensity when medically  cleared  Barriers to Discharge: Inaccessible home environment     Rehab Resource Intensity Level, PT: I (Maximum Resource Intensity)    See flowsheet documentation for full assessment.

## 2024-10-25 NOTE — SPEECH THERAPY NOTE
Speech Language/Pathology    Speech/Language Pathology Progress Note    Patient Name: Tommie Taylor  Today's Date: 10/25/2024       Subjective:  Pt seen for dysphagia tx follow up lunch. Pt more awake and alert. Pt on dysphagia 3 diet w/ NTL.     Objective:  Pt did not like lunch entree- stuffed shells and broccoli. Pt offered PB&J sand and given thin water by cup and straw. Pt took bites, exhibited good mastication/manipulation. Pocketing noted on R, needed reminders to clear. Pt used lingual sweeps to clear pocketing. Pt took sips of thin liquids w/ good oral control, including with food in his mouth. Swallows appeared timely, with no overt s/s aspiration.     Assessment:  Pt w/ improved swallowing skills, cont w/ pocketing on R, but able to clear w/ min cues.   No overt s/s aspiration with thin liquids by cup and straw.     Plan/Recommendations:  Rec advance to regular diet w/ thin liquids  Cue pt for pocketing  as needed- pt stated he knows he needs to make sure his mouth clear at end of meal.   Meds as tolerated   Will cont to follow      Mya Watson MA CCC-SLP  Speech Pathologist  Available via [employer]

## 2024-10-25 NOTE — ASSESSMENT & PLAN NOTE
Etiology suggested to be secondary to ATN versus TMA in the setting of uncontrolled blood pressure and complicated by contrast associated nephropathy   Baseline creatinine around 2.9  Current creatinine 4.21--has been fluctuating in the mid 4 range without improvement over the course of several days  UA with microhematuria, proteinuria  US kidney and bladder --bladder wall thickening but no hydronephrosis  Bladder scan with no retention  No indication of dialysis at this time per nephrology  Avoid nephrotoxic agents  Monitor I&Os  Nephrology following, input is appreciated.  Demadex being held and IV fluids were given transiently  Will need continued follow-up BMP as an outpatient

## 2024-10-25 NOTE — CASE MANAGEMENT
Case Management Discharge Planning Note    Patient name Tommie Taylor  Location S /S -01 MRN [id number]  : 1965 Date 10/25/2024       Current Admission Date: 10/14/2024  Current Admission Diagnosis:CVA (cerebral vascular accident) ([facility])   Patient Active Problem List    Diagnosis Date Noted Date Diagnosed    Stroke-like symptoms 10/22/2024     SIRS (systemic inflammatory response syndrome) ([facility]) 10/18/2024     Acute kidney injury superimposed on stage 4 chronic kidney disease ([facility]) 10/17/2024     Encephalopathy 10/17/2024     Volume overload 10/17/2024     Acid-base disorder, mixed 10/17/2024     CVA (cerebral vascular accident) ([facility]) 10/15/2024     Type 2 diabetes mellitus with stage 4 chronic kidney disease and hypertension ([facility]) 10/08/2024     Hypertensive emergency 2024     Vision decreased 2024     Memory deficit 04/15/2024     Tremor 04/15/2024     B12 deficiency 04/15/2024     Tardive dyskinesia 2023     Lightheaded 2023     Loss of appetite 2023     Unintended weight loss 2023     Class 3 severe obesity due to excess calories with serious comorbidity and body mass index (BMI) of 45.0 to 49.9 in adult ([facility]) 2023     Vitamin D deficiency 2021     Hyperlipidemia 2020     Toenail deformity 2019     Persistent proteinuria 2019     Anemia, unspecified 2019     Elevated serum creatinine 2019     Stage 4 chronic kidney disease ([facility]) 2019     DAISY (obstructive sleep apnea)      Generalized anxiety disorder 2018     Hiatal hernia 2018     Lower esophageal ring ([facility]) 2018     Insomnia 2018     Bipolar I disorder, severe, current or most recent episode depressed, with psychotic features ([facility]) 2017     Panic attacks 2017     GERD (gastroesophageal reflux disease) 2017     Flat foot 2016     Diabetic polyneuropathy ([facility]) 2014     DM2 (diabetes mellitus, type  2) (HCC) 12/11/2014     Allergic rhinitis 08/31/2012       LOS (days): 10  Geometric Mean LOS (GMLOS) (days): 4.8  Days to GMLOS:-5.8     OBJECTIVE:  Risk of Unplanned Readmission Score: 25.35         Current admission status: Inpatient   Preferred Pharmacy:   [id number] [place], [place] - [address]  [address] [facility] [address]  Phone: [phone] Fax: [phone]    [facility] #[id number] [place], [place] - [address]  [address] [facility] [address]  Phone: [phone] Fax: [phone]    [facility] #[id number] [place], [place] - [address]  [address] [facility] [address]  Phone: [phone] Fax: [phone]    Primary Care Provider: No primary care provider on file.    Primary Insurance: MEDICARE  Secondary Insurance: [facility]    DISCHARGE DETAILS:    Discharge planning discussed with:: Patient, spouse  Freedom of Choice: Yes  Comments - [place] of Choice: [facility]  CM contacted family/caregiver?: Yes (Spouse via phone)  Were Treatment Team discharge recommendations reviewed with patient/caregiver?: Yes  Did patient/caregiver verbalize understanding of patient care needs?: N/A- going to facility  Were patient/caregiver advised of the risks associated with not following Treatment Team discharge recommendations?: Yes    Contacts  Patient Contacts: Patient, spouse  Contact Method: In Person, Phone  Phone Number: [phone]  Reason/Outcome: Continuity of Care, Emergency Contact, Referral, Discharge Planning    Requested Home Health Care         Is the patient interested in HHC at discharge?: No    DME Referral Provided  Referral made for DME?: No    Other Referral/Resources/Interventions Provided:  Interventions: Acute Rehab, Transportation  Referral Comments: CM spoke with pt at bedside to f/u on dcp. Pt aware CM will tentatively arrange a 4pm transport for pt to dc to [facility]  location. Pt agreeable to same. CM reviewed IMM, provided pt with copy at bedside and placed original in scna bin on unit to be uploaded to pt chart. CM placed call to pt spouse to notify her of same. Spouse reiterated preference for [facility] [place] location. Spouse aware that as per [facility] liaison, [place] does not have bed availability. Spouse aware if bed becomes available tomorrow and pt is medically stable, location and transport can be switched. Spouse aware if there is not a bed at [place] on day of dc, pt will need to dc to [place] location. Spouse expressed understanding of same. CM requested a 4pm BLS transport via [facility] for tomorrow. CM placed medical necessity in chart on unit for transport. CM notified provider and [facility] of same.         Treatment Team Recommendation: Acute Rehab  Discharge Destination Plan:: Acute Rehab  Transport at Discharge : [facility]     Number/Name of Dispatcher: [facility] [phone]  Transported by (Company and Unit #): [id number]  ETA of Transport (Date): 10/26/24       IMM Given (Date):: 10/25/24  IMM Given to:: Patient  Family notified:: Marie Sevilla  IMM reviewed with patient, patient agrees with discharge determination.

## 2024-10-25 NOTE — ASSESSMENT & PLAN NOTE
Etiology: ATN from contrast associated nephropathy (10/14) versus TMA in setting of uncontrolled hypertension  Baseline creatinine around 2.9  Creatinine on admission 3.24 on 10/14  Peak creatinine 4.57 10/21  Creatinine today 4.21 improved with hydration  UA: Microhematuria (RBC 30-50), proteinuria  UPCR: 8.4 g prior to admission  Kidney ultrasound: No hydronephrosis  No hypocomplementemia  PLA2R negative  SPEP showing abnormal distribution in gamma region, previous immunofixation showed no monoclonal immunoglobulin  Continue to monitor for renal recovery  Discontinue torsemide completely  No need for IV fluids, encourage p.o. fluid intake (discussed with nursing)  No indication for renal replacement therapy  Okay to discharge from nephrology perspective  [facility] in 1 week as outpatient

## 2024-10-25 NOTE — ASSESSMENT & PLAN NOTE
Lab Results   Component Value Date    HGBA1C 8.6 (H) 10/16/2024     Recent Labs     10/24/24  1119 10/24/24  1638 10/24/24  2056 10/25/24  0726   POCGLU 116 108 97 71   Blood Sugar Average: Last 72 hrs:  (P) 108.5  A1c reflects poor control but blood sugars have been stable here on sliding scale alone  Currently maintained on semaglutide monotherapy outpatient- holding  SSI with accu checks  Hypoglycemia protocol  Diabetic diet

## 2024-10-26 ENCOUNTER — HOSPITAL ENCOUNTER (INPATIENT)
Facility: HOSPITAL | Age: 59
LOS: 23 days | Discharge: HOME HEALTH | DRG: 056 | End: 2024-11-18
Attending: STUDENT IN AN ORGANIZED HEALTH CARE EDUCATION/TRAINING PROGRAM | Admitting: STUDENT IN AN ORGANIZED HEALTH CARE EDUCATION/TRAINING PROGRAM
Payer: MEDICARE

## 2024-10-26 VITALS
WEIGHT: 264.55 LBS | HEART RATE: 62 BPM | BODY MASS INDEX: 42.52 KG/M2 | TEMPERATURE: 97.9 F | HEIGHT: 66 IN | SYSTOLIC BLOOD PRESSURE: 129 MMHG | RESPIRATION RATE: 18 BRPM | OXYGEN SATURATION: 99 % | DIASTOLIC BLOOD PRESSURE: 73 MMHG

## 2024-10-26 DIAGNOSIS — M89.9 CHRONIC KIDNEY DISEASE-MINERAL AND BONE DISORDER (CKD-MBD): ICD-10-CM

## 2024-10-26 DIAGNOSIS — G47.33 OSA (OBSTRUCTIVE SLEEP APNEA): ICD-10-CM

## 2024-10-26 DIAGNOSIS — G47.00 INSOMNIA, UNSPECIFIED TYPE: ICD-10-CM

## 2024-10-26 DIAGNOSIS — I63.9 CEREBROVASCULAR ACCIDENT (CVA), UNSPECIFIED MECHANISM (HCC): ICD-10-CM

## 2024-10-26 DIAGNOSIS — E11.42 DIABETIC POLYNEUROPATHY ASSOCIATED WITH TYPE 2 DIABETES MELLITUS (HCC): ICD-10-CM

## 2024-10-26 DIAGNOSIS — F31.5 BIPOLAR I DISORDER, SEVERE, CURRENT OR MOST RECENT EPISODE DEPRESSED, WITH PSYCHOTIC FEATURES (HCC): ICD-10-CM

## 2024-10-26 DIAGNOSIS — N18.4 STAGE 4 CHRONIC KIDNEY DISEASE (HCC): Primary | ICD-10-CM

## 2024-10-26 DIAGNOSIS — N18.9 CHRONIC KIDNEY DISEASE: ICD-10-CM

## 2024-10-26 DIAGNOSIS — E11.9 DM2 (DIABETES MELLITUS, TYPE 2) (HCC): ICD-10-CM

## 2024-10-26 DIAGNOSIS — R41.3 MEMORY DEFICIT: ICD-10-CM

## 2024-10-26 DIAGNOSIS — I12.9 TYPE 2 DIABETES MELLITUS WITH STAGE 4 CHRONIC KIDNEY DISEASE AND HYPERTENSION (HCC): ICD-10-CM

## 2024-10-26 DIAGNOSIS — E55.9 VITAMIN D DEFICIENCY: ICD-10-CM

## 2024-10-26 DIAGNOSIS — N18.4 TYPE 2 DIABETES MELLITUS WITH STAGE 4 CHRONIC KIDNEY DISEASE AND HYPERTENSION (HCC): ICD-10-CM

## 2024-10-26 DIAGNOSIS — E83.42 HYPOMAGNESEMIA: ICD-10-CM

## 2024-10-26 DIAGNOSIS — N17.9 AKI (ACUTE KIDNEY INJURY) (HCC): ICD-10-CM

## 2024-10-26 DIAGNOSIS — F31.5 BIPOLAR DISORDER, CURRENT EPISODE DEPRESSED, SEVERE, WITH PSYCHOTIC FEATURES (HCC): ICD-10-CM

## 2024-10-26 DIAGNOSIS — R25.1 TREMOR: ICD-10-CM

## 2024-10-26 DIAGNOSIS — E83.9 CHRONIC KIDNEY DISEASE-MINERAL AND BONE DISORDER (CKD-MBD): ICD-10-CM

## 2024-10-26 DIAGNOSIS — N18.9 CHRONIC KIDNEY DISEASE-MINERAL AND BONE DISORDER (CKD-MBD): ICD-10-CM

## 2024-10-26 DIAGNOSIS — K21.9 GASTROESOPHAGEAL REFLUX DISEASE, UNSPECIFIED WHETHER ESOPHAGITIS PRESENT: ICD-10-CM

## 2024-10-26 DIAGNOSIS — E11.22 TYPE 2 DIABETES MELLITUS WITH STAGE 4 CHRONIC KIDNEY DISEASE AND HYPERTENSION (HCC): ICD-10-CM

## 2024-10-26 DIAGNOSIS — I10 PRIMARY HYPERTENSION: ICD-10-CM

## 2024-10-26 PROBLEM — Z74.09 IMPAIRED MOBILITY AND ACTIVITIES OF DAILY LIVING: Status: ACTIVE | Noted: 2024-10-26

## 2024-10-26 PROBLEM — R65.10 SIRS (SYSTEMIC INFLAMMATORY RESPONSE SYNDROME) (HCC): Status: RESOLVED | Noted: 2024-10-18 | Resolved: 2024-10-26

## 2024-10-26 PROBLEM — I16.1 HYPERTENSIVE EMERGENCY: Status: RESOLVED | Noted: 2024-07-24 | Resolved: 2024-10-26

## 2024-10-26 PROBLEM — Z78.9 IMPAIRED MOBILITY AND ACTIVITIES OF DAILY LIVING: Status: ACTIVE | Noted: 2024-10-26

## 2024-10-26 PROBLEM — G93.40 ENCEPHALOPATHY: Status: RESOLVED | Noted: 2024-10-17 | Resolved: 2024-10-26

## 2024-10-26 LAB
ANION GAP SERPL CALCULATED.3IONS-SCNC: 5 MMOL/L (ref 4–13)
BUN SERPL-MCNC: 36 MG/DL (ref 5–25)
CALCIUM SERPL-MCNC: 8.5 MG/DL (ref 8.4–10.2)
CHLORIDE SERPL-SCNC: 111 MMOL/L (ref 96–108)
CO2 SERPL-SCNC: 24 MMOL/L (ref 21–32)
CREAT SERPL-MCNC: 4.04 MG/DL (ref 0.6–1.3)
GFR SERPL CREATININE-BSD FRML MDRD: 15 ML/MIN/1.73SQ M
GLUCOSE SERPL-MCNC: 118 MG/DL (ref 65–140)
GLUCOSE SERPL-MCNC: 134 MG/DL (ref 65–140)
GLUCOSE SERPL-MCNC: 145 MG/DL (ref 65–140)
GLUCOSE SERPL-MCNC: 77 MG/DL (ref 65–140)
GLUCOSE SERPL-MCNC: 81 MG/DL (ref 65–140)
POTASSIUM SERPL-SCNC: 3.8 MMOL/L (ref 3.5–5.3)
SODIUM SERPL-SCNC: 140 MMOL/L (ref 135–147)

## 2024-10-26 PROCEDURE — 97110 THERAPEUTIC EXERCISES: CPT

## 2024-10-26 PROCEDURE — NC001 PR NO CHARGE: Performed by: STUDENT IN AN ORGANIZED HEALTH CARE EDUCATION/TRAINING PROGRAM

## 2024-10-26 PROCEDURE — 82948 REAGENT STRIP/BLOOD GLUCOSE: CPT

## 2024-10-26 PROCEDURE — 99239 HOSP IP/OBS DSCHRG MGMT >30: CPT | Performed by: INTERNAL MEDICINE

## 2024-10-26 PROCEDURE — 94660 CPAP INITIATION&MGMT: CPT

## 2024-10-26 PROCEDURE — 80048 BASIC METABOLIC PNL TOTAL CA: CPT | Performed by: PHYSICIAN ASSISTANT

## 2024-10-26 PROCEDURE — 97530 THERAPEUTIC ACTIVITIES: CPT

## 2024-10-26 RX ORDER — INSULIN LISPRO 100 [IU]/ML
1-5 INJECTION, SOLUTION INTRAVENOUS; SUBCUTANEOUS
Status: DISCONTINUED | OUTPATIENT
Start: 2024-10-26 | End: 2024-11-06

## 2024-10-26 RX ORDER — CLOPIDOGREL BISULFATE 75 MG/1
75 TABLET ORAL DAILY
Status: COMPLETED | OUTPATIENT
Start: 2024-10-27 | End: 2024-11-05

## 2024-10-26 RX ORDER — CALCIUM CARBONATE 500 MG/1
1000 TABLET, CHEWABLE ORAL 2 TIMES DAILY PRN
Status: DISCONTINUED | OUTPATIENT
Start: 2024-10-26 | End: 2024-11-18 | Stop reason: HOSPADM

## 2024-10-26 RX ORDER — OLANZAPINE 2.5 MG/1
10 TABLET, FILM COATED ORAL
Status: DISCONTINUED | OUTPATIENT
Start: 2024-10-26 | End: 2024-11-18 | Stop reason: HOSPADM

## 2024-10-26 RX ORDER — ACETAMINOPHEN 325 MG/1
650 TABLET ORAL EVERY 6 HOURS PRN
Status: ON HOLD
Start: 2024-10-26

## 2024-10-26 RX ORDER — INSULIN LISPRO 100 [IU]/ML
2-12 INJECTION, SOLUTION INTRAVENOUS; SUBCUTANEOUS
Status: ON HOLD
Start: 2024-10-26

## 2024-10-26 RX ORDER — ASPIRIN 81 MG/1
81 TABLET ORAL DAILY
Status: DISCONTINUED | OUTPATIENT
Start: 2024-10-27 | End: 2024-11-18 | Stop reason: HOSPADM

## 2024-10-26 RX ORDER — HYDRALAZINE HYDROCHLORIDE 25 MG/1
25 TABLET, FILM COATED ORAL EVERY 8 HOURS SCHEDULED
Status: DISCONTINUED | OUTPATIENT
Start: 2024-10-26 | End: 2024-11-05

## 2024-10-26 RX ORDER — INSULIN LISPRO 100 [IU]/ML
1-6 INJECTION, SOLUTION INTRAVENOUS; SUBCUTANEOUS
Status: DISCONTINUED | OUTPATIENT
Start: 2024-10-26 | End: 2024-11-06

## 2024-10-26 RX ORDER — LABETALOL 100 MG/1
300 TABLET, FILM COATED ORAL 2 TIMES DAILY
Status: DISCONTINUED | OUTPATIENT
Start: 2024-10-26 | End: 2024-11-18 | Stop reason: HOSPADM

## 2024-10-26 RX ORDER — NIFEDIPINE 30 MG/1
90 TABLET, EXTENDED RELEASE ORAL DAILY
Status: DISCONTINUED | OUTPATIENT
Start: 2024-10-27 | End: 2024-11-11

## 2024-10-26 RX ORDER — ACETAMINOPHEN 325 MG/1
650 TABLET ORAL EVERY 6 HOURS PRN
Status: DISCONTINUED | OUTPATIENT
Start: 2024-10-26 | End: 2024-11-18 | Stop reason: HOSPADM

## 2024-10-26 RX ORDER — NIFEDIPINE 30 MG/1
90 TABLET, EXTENDED RELEASE ORAL DAILY
Status: ON HOLD
Start: 2024-10-27

## 2024-10-26 RX ORDER — TRAZODONE HYDROCHLORIDE 50 MG/1
50 TABLET, FILM COATED ORAL
Status: DISCONTINUED | OUTPATIENT
Start: 2024-10-26 | End: 2024-11-18 | Stop reason: HOSPADM

## 2024-10-26 RX ORDER — PANTOPRAZOLE SODIUM 40 MG/1
40 TABLET, DELAYED RELEASE ORAL
Status: DISCONTINUED | OUTPATIENT
Start: 2024-10-27 | End: 2024-11-18 | Stop reason: HOSPADM

## 2024-10-26 RX ORDER — CLOPIDOGREL BISULFATE 75 MG/1
75 TABLET ORAL DAILY
Status: ON HOLD
Start: 2024-10-27 | End: 2024-11-06

## 2024-10-26 RX ORDER — HEPARIN SODIUM 5000 [USP'U]/ML
7500 INJECTION, SOLUTION INTRAVENOUS; SUBCUTANEOUS EVERY 8 HOURS SCHEDULED
Status: DISCONTINUED | OUTPATIENT
Start: 2024-10-26 | End: 2024-11-18 | Stop reason: HOSPADM

## 2024-10-26 RX ORDER — POLYETHYLENE GLYCOL 3350 17 G/17G
17 POWDER, FOR SOLUTION ORAL DAILY PRN
Status: ON HOLD
Start: 2024-10-26

## 2024-10-26 RX ORDER — HEPARIN SODIUM 5000 [USP'U]/ML
7500 INJECTION, SOLUTION INTRAVENOUS; SUBCUTANEOUS EVERY 8 HOURS SCHEDULED
Status: ON HOLD
Start: 2024-10-26

## 2024-10-26 RX ORDER — ATORVASTATIN CALCIUM 40 MG/1
40 TABLET, FILM COATED ORAL EVERY EVENING
Status: DISCONTINUED | OUTPATIENT
Start: 2024-10-26 | End: 2024-11-18 | Stop reason: HOSPADM

## 2024-10-26 RX ORDER — CALCIUM CARBONATE 500 MG/1
1000 TABLET, CHEWABLE ORAL 2 TIMES DAILY PRN
Status: ON HOLD
Start: 2024-10-26

## 2024-10-26 RX ORDER — POLYETHYLENE GLYCOL 3350 17 G/17G
17 POWDER, FOR SOLUTION ORAL DAILY PRN
Status: DISCONTINUED | OUTPATIENT
Start: 2024-10-26 | End: 2024-10-28

## 2024-10-26 RX ORDER — BUPROPION HYDROCHLORIDE 100 MG/1
100 TABLET ORAL 2 TIMES DAILY
Status: DISCONTINUED | OUTPATIENT
Start: 2024-10-26 | End: 2024-11-18 | Stop reason: HOSPADM

## 2024-10-26 RX ORDER — HYDRALAZINE HYDROCHLORIDE 25 MG/1
25 TABLET, FILM COATED ORAL EVERY 8 HOURS SCHEDULED
Status: ON HOLD
Start: 2024-10-26

## 2024-10-26 RX ADMIN — VALBENAZINE 40 MG: 40 CAPSULE ORAL at 21:32

## 2024-10-26 RX ADMIN — ACETAMINOPHEN 650 MG: 325 TABLET ORAL at 18:42

## 2024-10-26 RX ADMIN — LABETALOL HYDROCHLORIDE 300 MG: 100 TABLET, FILM COATED ORAL at 20:00

## 2024-10-26 RX ADMIN — HYDRALAZINE HYDROCHLORIDE 25 MG: 25 TABLET ORAL at 06:12

## 2024-10-26 RX ADMIN — PANTOPRAZOLE SODIUM 40 MG: 40 TABLET, DELAYED RELEASE ORAL at 06:12

## 2024-10-26 RX ADMIN — ASPIRIN 81 MG: 81 TABLET, COATED ORAL at 09:19

## 2024-10-26 RX ADMIN — BUPROPION HYDROCHLORIDE 100 MG: 100 TABLET, FILM COATED ORAL at 18:42

## 2024-10-26 RX ADMIN — CHLORHEXIDINE GLUCONATE 15 ML: 1.2 RINSE ORAL at 09:19

## 2024-10-26 RX ADMIN — HYDRALAZINE HYDROCHLORIDE 25 MG: 25 TABLET ORAL at 14:02

## 2024-10-26 RX ADMIN — OLANZAPINE 10 MG: 2.5 TABLET, FILM COATED ORAL at 21:32

## 2024-10-26 RX ADMIN — ATORVASTATIN CALCIUM 40 MG: 40 TABLET, FILM COATED ORAL at 18:42

## 2024-10-26 RX ADMIN — CLOPIDOGREL BISULFATE 75 MG: 75 TABLET ORAL at 09:19

## 2024-10-26 RX ADMIN — HEPARIN SODIUM 7500 UNITS: 5000 INJECTION INTRAVENOUS; SUBCUTANEOUS at 01:59

## 2024-10-26 RX ADMIN — HYDRALAZINE HYDROCHLORIDE 25 MG: 25 TABLET ORAL at 21:32

## 2024-10-26 RX ADMIN — BUPROPION HYDROCHLORIDE TABLETS 100 MG: 100 TABLET, FILM COATED ORAL at 09:19

## 2024-10-26 RX ADMIN — NIFEDIPINE 90 MG: 30 TABLET, FILM COATED, EXTENDED RELEASE ORAL at 09:19

## 2024-10-26 RX ADMIN — HEPARIN SODIUM 7500 UNITS: 5000 INJECTION INTRAVENOUS; SUBCUTANEOUS at 09:20

## 2024-10-26 RX ADMIN — LABETALOL HYDROCHLORIDE 300 MG: 100 TABLET, FILM COATED ORAL at 09:19

## 2024-10-26 NOTE — CASE MANAGEMENT
Case Management Discharge Planning Note    Patient name Tommie Taylor  Location S /S -01 MRN [id number]  : 1965 Date 10/26/2024       Current Admission Date: 10/14/2024  Current Admission Diagnosis:CVA (cerebral vascular accident) ([facility])   Patient Active Problem List    Diagnosis Date Noted Date Diagnosed    Stroke-like symptoms 10/22/2024     SIRS (systemic inflammatory response syndrome) ([facility]) 10/18/2024     Acute kidney injury superimposed on stage 4 chronic kidney disease ([facility]) 10/17/2024     Encephalopathy 10/17/2024     Volume overload 10/17/2024     Acid-base disorder, mixed 10/17/2024     CVA (cerebral vascular accident) ([facility]) 10/15/2024     Type 2 diabetes mellitus with stage 4 chronic kidney disease and hypertension ([facility]) 10/08/2024     Hypertensive emergency 2024     Vision decreased 2024     Memory deficit 04/15/2024     Tremor 04/15/2024     B12 deficiency 04/15/2024     Tardive dyskinesia 2023     Lightheaded 2023     Loss of appetite 2023     Unintended weight loss 2023     Class 3 severe obesity due to excess calories with serious comorbidity and body mass index (BMI) of 45.0 to 49.9 in adult ([facility]) 2023     Vitamin D deficiency 2021     Hyperlipidemia 2020     Toenail deformity 2019     Persistent proteinuria 2019     Anemia, unspecified 2019     Elevated serum creatinine 2019     Stage 4 chronic kidney disease ([facility]) 2019     DAISY (obstructive sleep apnea)      Generalized anxiety disorder 2018     Hiatal hernia 2018     Lower esophageal ring ([facility]) 2018     Insomnia 2018     Bipolar I disorder, severe, current or most recent episode depressed, with psychotic features ([facility]) 2017     Panic attacks 2017     GERD (gastroesophageal reflux disease) 2017     Flat foot 2016     Diabetic polyneuropathy ([facility]) 2014     DM2 (diabetes mellitus, type  2) ([facility]) 12/11/2014     Allergic rhinitis 08/31/2012       LOS (days): 11  Geometric Mean LOS (GMLOS) (days): 4.8  Days to GMLOS:-6.7     OBJECTIVE:  Risk of Unplanned Readmission Score: 25.44         Current admission status: Inpatient   Preferred Pharmacy:   [facility] #[id number] [place], [place] - [address]  [address] [facility] [address]  Phone: [phone] Fax: [phone]    [facility] #[id number] [place], [place] - [address]  [address] [facility] [address]  Phone: [phone] Fax: [phone]    [email or web address] ([facility] #[id number] [place], [place] - [address]  [address] [facility] [address]  Phone: [phone] Fax: [phone]    Primary Care Provider: No primary care provider on file.    Primary Insurance: MEDICARE  Secondary Insurance: [facility]    DISCHARGE DETAILS:  Per VIOLETTE, patient is medically stable for discharge today.     [facility] updated patient ate 100% of his dinner last night and 50% breakfast this morning. Per nursing, patient alert and oriented.     [facility] is able to accept today at [place] location. Room # 267. Report number is . Patient's transport set up for 1600 via [facility].     Spoke with patient's spouse Marie at . Introduced self and role. Spouse updated per SLIM, patient is medically stable for discharge to [facility]. Spouse updated [facility] is able to accept today at their [place] location off [facility] in [place]. Spouse provided room number and contact number for facility. Spouse aware that transport is set up today at 1600. All questions/concerns answered at this time.     CM spoke with patient at the bedside. Introduced self and role. Patient updated with plan of care and that his spouse was updated. No questions/concerns noted.     SLIM and nursing updated.

## 2024-10-26 NOTE — ASSESSMENT & PLAN NOTE
History of chronic bipolar 1 disorder and follows with outpatient psychiatry and also was seen inpatient  Mood has been stable and is maintained on Zyprexa, bupropion and trazodone as well as Ingrezza for tardive dyskinesia  Follow-up with psychiatry as an outpatient and consider virtual consultation if indicated for any changes while on [facility]

## 2024-10-26 NOTE — DISCHARGE SUMMARY
Discharge Summary - Hospitalist   Name: Tommie Taylor 59 y.o. male I MRN: [id number]  Unit/Bed#: S -01 I Date of Admission: 10/14/2024   Date of Service: 10/26/2024 I Hospital Day: 11     Assessment & Plan  CVA (cerebral vascular accident) (HCC)  Patient with history of morbid obesity, hypertension and uncontrolled diabetes who presented with right upper and right lower extremity weakness with associated right facial droop  CT/CTA head unremarkable for acute process  TnK administered following correction of hypertension with nicardipine drip in the ICU  Following thrombolytic administration patient developed worsening slurred speech and headache, had repeat CT head which was unremarkable  Repeat head CT at 24 hours post TNK negative for signs of bleed  Neurology consult  MRI significant for left corona radiata stroke.  Placed on DAPT therapy for 21 days and then continue aspirin monotherapy indefinitely per neurology.  Serial repeat imaging during this hospitalization has remained stable  Started on Lipitor 40 mg per stroke protocol  Recommend Zio patch outpatient to evaluate for any possible arrhythmia  Will need f/u with neurovascular team in 6-8 weeks   PT/OT recommending level 1-- for acute rehab placement today.  Outpatient follow-up with neurologist and primary care physician.  Acute kidney injury superimposed on stage 4 chronic kidney disease (HCC)  Etiology suggested to be secondary to ATN versus TMA in the setting of uncontrolled blood pressure and complicated by contrast associated nephropathy   Baseline creatinine around 2.9  Current creatinine 4.21--has been fluctuating in the mid 4 range without improvement over the course of several days, now with improvement to 4.04.  UA with microhematuria, proteinuria  US kidney and bladder --bladder wall thickening but no hydronephrosis  Bladder scan with no retention  No indication of dialysis at this time per nephrology  Avoid nephrotoxic agents  Monitor  I&Os  Nephrology following, input is appreciated.  Demadex being held and IV fluids were given transiently  Will need continued follow-up BMP as an outpatient/at the rehabilitation facility.  Outpatient follow-up with nephrologist.  Encephalopathy (Resolved: 10/26/2024)  Resolved.  Noted to be more lethargic on exam 10/17--then improved  Am of 10/22/2024 patient confused, found naked in bed.  Appreciate neurology reassessment--appeared stable later in the day.  Of note he does have a history of cognitive impairment with prior MoCA 18 out of 30  Repeat head CT was stable  Rechecked B12 (low in past)--now normal  Appreciate psych input, no medication adjustment was required  EEG showed moderate nonspecific cerebral dysfunction with no epileptic changes  Serial imaging unchanged  Outpatient follow-up with primary care physician and neurologist.    Hypertensive emergency (Resolved: 10/26/2024)  Resolved.  Hypertensive emergency on admission in the setting of likely CVA  Evidenced by bp 197/104->231/105 POA  S/p Cardizem drip 10/15  PTA medications: Norvasc 10 mg and labetalol 100 mg twice daily.   Appreciate nephro assistance in BP management   Discontinued Norvasc  Increased nifedipine to 90 mg daily  10/22 Increased labetolol to 300mg BID  Demadex 20 mg daily--now on hold per nephrology based on creatinine  BPs more recently are stable.  Goal is normotension now per neurology  Outpatient follow-up with nephrologist and primary care physician.  SIRS (systemic inflammatory response syndrome) (HCC) (Resolved: 10/26/2024)  Noted on 10/17/24 for tachycardia and fever 101.3, no additional recurrences since that time  UA negative for infection  CXR: No acute cardiopulmonary disease  Lactic acid WNL  Procalcitonin 0.29-> 0.29->0.26  BC 1/2 positive for acitinomyces odontolyticus--appreciate curbside infectious disease input.  This is likely contaminant and does not require further antibiotic treatment  No clear source of  infection, consider possibility of aspiration given new dysphagia from stroke  CT C/A/P wo contrast given ROSINA. No evidence of acute infectious process  After patient was noted to be afebrile 24 hours and no clear source of infection,  IV cefepime and vancomycin were discontinued 10/20/24.  Continue to monitor.  Resolved  Outpatient follow-up with PCP.    Bipolar I disorder, severe, current or most recent episode depressed, with psychotic features ([facility])  History of Bipolar disorder, follows with psychiatry.  Greatly appreciate their consultation.  Mood is stable.  Okay to maintain current medication regimen  Continue Zyprexa, buproprion, trazodone  Also on Ingrezza for tardive dyskinesia  Outpatient follow-up with psychiatry.  DM2 (diabetes mellitus, type 2) ([facility])  Lab Results   Component Value Date    HGBA1C 8.6 (H) 10/16/2024     Recent Labs     10/25/24  2005 10/26/24  0726 10/26/24  1146 10/26/24  1743   POCGLU 119 77 134 145*   Blood Sugar Average: Last 72 hrs:  (P) 111.2461907729027609  A1c reflects poor control but blood sugars have been stable here on sliding scale alone  Currently maintained on semaglutide monotherapy outpatient- holding due to ROSINA.  SSI with accu checks  Hypoglycemia protocol  Diabetic diet  Outpatient follow-up with PCP.     Medical Problems       Resolved Problems  Date Reviewed: 10/26/2024            Resolved    Hypertensive emergency 10/26/2024     Resolved by  Ale Sanchez MD    Encephalopathy 10/26/2024     Resolved by  Ale Sanchez MD    SIRS (systemic inflammatory response syndrome) ([facility]) 10/26/2024     Resolved by  Ale Sanchez MD        Discharging Physician / Practitioner: Ale Sanchez MD  PCP: No primary care provider on file.  Admission Date:   Admission Orders (From admission, onward)       Ordered        10/15/24 0109  Inpatient Admission  Once                          Discharge Date: 10/26/24    Consultations During  Hospital Stay:  Nephrologist.  Neurologist.  Psychiatrist.  PM&R doctor.    Procedures Performed:   Please see notes above.    Significant Findings / Test Results:   Please see notes above.    Incidental Findings:   None.    Test Results Pending at Discharge (will require follow up):   None.     Outpatient Tests Requested:  None.  However I am recommending rechecking/monitoring patient's CBC and BMP in the rehab facility and in the outpatient. Doctor in the rehabilitation facility and/or outpatient doctor may facilitate these.    Complications:   None.    Reason for Admission: Right upper and lower extremity weakness.    Hospital Course:   Tommie Taylor is a 59 y.o. male patient who originally presented to the hospital on 10/14/2024 due to right upper and lower extremity weakness.  On this admission, patient was found to have acute CVA.  MRI revealed left corona radiata stroke.  Neurologist was on board.  TNK was administered in the ICU.  With hypertensive emergency, patient was also noted nicardipine drip.  Patient eventually was placed on dual antiplatelet therapy for total of 21 days, then continue aspirin monotherapy indefinitely as per neurology.  Continue atorvastatin.  Recommended Zio patch in the outpatient to evaluate for any possible arrhythmia, for which PCP may facilitate this.  As per neurologist, patient will follow-up with them in the office in 6 to 8 weeks.  Patient's hospital stay was complicated with him developing acute kidney injury likely secondary to ATN versus TMA in the setting of uncontrolled blood pressure and complicated with contrast associated nephropathy.  Nephrologist was on board.  This past few days, patient's serum creatinine improving slowly.  Nephrologist was okay with the discharge and the patient today.  Patient's BMP should be monitored closely  In the rehab facility and in the outpatient.  Patient's condition improved, but still had significant right sided weakness/paresis,  "and agreed with acute rehabilitation facility placement and discharge today.    Please see above list of diagnoses and related plan for additional information.       Condition at Discharge: stable    Discharge Day Visit / Exam:   Subjective:    Vitals: Blood Pressure: 129/73 (10/26/24 1351)  Pulse: 62 (10/26/24 1351)  Temperature: 97.9 °F (36.6 °C) (10/26/24 0725)  Temp Source: Oral (10/26/24 0725)  Respirations: 18 (10/26/24 0725)  Height: 5' 6\" (167.6 cm) (10/15/24 1130)  Weight - Scale: 120 kg (264 lb 8.8 oz) (10/26/24 0600)  SpO2: 99 % (10/26/24 1351)  Physical Exam  Vitals and nursing note reviewed.   Constitutional:       General: He is not in acute distress.     Appearance: He is not ill-appearing, toxic-appearing or diaphoretic.   Cardiovascular:      Rate and Rhythm: Normal rate and regular rhythm.      Heart sounds: Normal heart sounds.   Pulmonary:      Effort: Pulmonary effort is normal. No respiratory distress.      Breath sounds: Normal breath sounds.   Abdominal:      General: Bowel sounds are normal. There is no distension.      Palpations: Abdomen is soft.      Tenderness: There is no abdominal tenderness.   Musculoskeletal:      Right lower leg: No edema.      Left lower leg: No edema.   Skin:     General: Skin is warm.      Coloration: Skin is not pale.      Findings: No erythema or rash.   Neurological:      Mental Status: He is alert and oriented to person, place, and time. Mental status is at baseline.      Cranial Nerves: Cranial nerve deficit present.      Sensory: No sensory deficit.      Motor: Weakness present.      Comments: Patient still has right facial droop and right upper and lower extremity weakness.   Psychiatric:         Mood and Affect: Mood normal.         Behavior: Behavior normal.         Thought Content: Thought content normal.              Discussion with Family: Patient declined call to .     Discharge instructions/Information to patient and family:   See " after visit summary for information provided to patient and family.      Provisions for Follow-Up Care:  See after visit summary for information related to follow-up care and any pertinent home health orders.      Mobility at time of Discharge:   Basic Mobility Inpatient Raw Score: 11  JH-HLM Goal: 4: Move to chair/commode  JH-HLM Achieved: 4: Move to chair/commode  HLM Goal NOT achieved. Continue to encourage mobility in post discharge setting.     Disposition:   Acute Rehab at [place] acute rehab unit    Planned Readmission: None.    Discharge Medications:  See after visit summary for reconciled discharge medications provided to patient and/or family.      Administrative Statements   Discharge Statement:  I have spent a total time of 40 minutes in caring for this patient on the day of the visit/encounter. >30 minutes of time was spent on: Diagnostic results, Prognosis, Risks and benefits of tx options, Instructions for management, Patient and family education, Importance of tx compliance, Risk factor reductions, Impressions, Counseling / Coordination of care, Documenting in the medical record, Reviewing / ordering tests, medicine, procedures  , and Communicating with other healthcare professionals .    **Please Note: This note may have been constructed using a voice recognition system**

## 2024-10-26 NOTE — ASSESSMENT & PLAN NOTE
Noted on 10/17/24 for tachycardia and fever 101.3, no additional recurrences since that time  UA negative for infection  CXR: No acute cardiopulmonary disease  Lactic acid WNL  Procalcitonin 0.29-> 0.29->0.26  BC 1/2 positive for acitinomyces odontolyticus--appreciate [facility] infectious disease input.  This is likely contaminant and does not require further antibiotic treatment  No clear source of infection, consider possibility of aspiration given new dysphagia from stroke  CT C/A/P wo contrast given ROSINA. No evidence of acute infectious process  After patient was noted to be afebrile 24 hours and no clear source of infection,  IV cefepime and vancomycin were discontinued 10/20/24.  Continue to monitor.  Resolved  Outpatient follow-up with PCP.

## 2024-10-26 NOTE — ASSESSMENT & PLAN NOTE
Resolved.  Noted to be more lethargic on exam 10/17--then improved  Am of 10/22/2024 patient confused, found naked in bed.  Appreciate neurology reassessment--appeared stable later in the day.  Of note he does have a history of cognitive impairment with prior MoCA 18 out of 30  Repeat head CT was stable  Rechecked B12 (low in past)--now normal  Appreciate psych input, no medication adjustment was required  EEG showed moderate nonspecific cerebral dysfunction with no epileptic changes  Serial imaging unchanged  Outpatient follow-up with primary care physician and neurologist.

## 2024-10-26 NOTE — ASSESSMENT & PLAN NOTE
Resolved.  Hypertensive emergency on admission in the setting of likely CVA  Evidenced by bp 197/104->231/105 POA  S/p Cardizem drip 10/15  PTA medications: Norvasc 10 mg and labetalol 100 mg twice daily.   Appreciate nephro assistance in BP management   Discontinued Norvasc  Increased nifedipine to 90 mg daily  10/22 Increased labetolol to 300mg BID  Demadex 20 mg daily--now on hold per nephrology based on creatinine  BPs more recently are stable.  Goal is normotension now per neurology  Outpatient follow-up with nephrologist and primary care physician.

## 2024-10-26 NOTE — ASSESSMENT & PLAN NOTE
History of Bipolar disorder, follows with psychiatry.  Greatly appreciate their consultation.  Mood is stable.  Okay to maintain current medication regimen  Continue Zyprexa, buproprion, trazodone  Also on Ingrezza for tardive dyskinesia  Outpatient follow-up with psychiatry.   Refill request: Advair Diskus 250-50 inhaler daily  Last refill: 6/30/22 #3, R-3  Dx: Asthma    Last office visit/physical:  4/27/22  Appointment scheduled (FP)?  Yes - date: 8/24/22    Refilled per protocol

## 2024-10-26 NOTE — PLAN OF CARE
Problem: PHYSICAL THERAPY ADULT  Goal: Performs mobility at highest level of function for planned discharge setting.  See evaluation for individualized goals.  Description: Treatment/Interventions: Functional transfer training, LE strengthening/ROM, Elevations, Therapeutic exercise, Endurance training, Patient/family training, Equipment eval/education, Bed mobility, Gait training, Compensatory technique education, Spoke to nursing, Spoke to case management    Equipment Recommended: Other (Comment) (TBD pending progress)     See flowsheet documentation for full assessment, interventions and recommendations.  Outcome: Not Progressing  Note: Prognosis: Good  Problem List: Decreased strength, Decreased range of motion, Impaired balance, Decreased endurance, Decreased mobility, Decreased coordination, Decreased safety awareness, Obesity  Assessment: pt began tx session lying supine in the bed as pt was agreeable to participate in PT intervention. PT to focus on bed mobility, transfer training, TE activities and increasing pt activity tolerance/endurance. In comparison to previous tx session pt required an increase in level of assistance required for completing a supine<>sit EOB transfer as pt required mod ax1 w/ trunk management. pt was able to sit EOB w/o LOB while being educated on functional transfers w/ SBQC use in LUE. pt continues to require max ax1 for all functional transfers towards his L side with SBQC. pt required constant Vc's for lateral stepping and tactile cues for RLE. While seated in the recliner pt was able to tolerate and participate in TE activities >8 minutes. Post tx pt in the recliner with legs elevated, chair alarm activated and all pt needs met. pt continues to be at an increased risk for falls and injuries with all OOB activities at thius time and would benefit from continued skilled PT intervention oin order to increase pt safety w/ all OOB activities. Continue to recommend Dc w/ level 1  maximal rehab resource intensity when medically cleared  Barriers to Discharge: Inaccessible home environment     Rehab Resource Intensity Level, PT: I (Maximum Resource Intensity)    See flowsheet documentation for full assessment.

## 2024-10-26 NOTE — ARC ADMISSION
Patient is medically cleared for discharge and is approved for admission to [facility] today.  Patient will admit to [facility] room 267 and has a 1600 transport time.  Report can be called to [phone].  CM has been updated with same in [place].

## 2024-10-26 NOTE — H&P
PHYSICAL MEDICINE AND REHABILITATION H&P/ADMISSION NOTE  Tommie Taylor 59 y.o. male MRN: [id number]  Unit/Bed#: [facility] 267-01 Encounter: [id number]     Rehab Diagnosis: Impairment of mobility, safety, Activities of Daily Living (ADLs), and cognitive/communication skills due to Stroke:  01.2  Right Body Involvement (Left Brain)    Etiologic Dx: Left Corona Radiata Infarct  Date of Onset: 10/14/2024   Date of surgery: N/A    History of Present Illness:   Tommie Taylor is a 59 y.o. male with history of bipolar disorder, CKD stage IV, type 2 diabetes, hypertension, tardive dyskinesia, sleep apnea, GERD who presented to the [facility] on 10/14/2024 for right-sided weakness and facial droop.  MRI revealed a left corona radiata infarct and neurology was consulted and was administered TNK.  He initially required a Cardene drip for his hypertensive emergency and was eventually placed on dual antiplatelet therapy with plan to continue aspirin and Plavix until that date and continue with aspirin as monotherapy as well as atorvastatin for secondary stroke prophylaxis.  He was recommended for Zio patch placement as an outpatient.  His hospital course was complicated by acute kidney injury on top of his CKD thought to be related to ATN in the setting of uncontrolled blood pressure as well as contrast associated nephropathy.  Nephrology did follow during his course and had some levels of improvement however has not returned back to his baseline level of creatinine.  Additionally there was some levels of encephalopathy which are stable at this time. The patient was evaluated by the Rehabilitation team and deemed an appropriate candidate for comprehensive inpatient rehabilitation and admitted to the [facility] on 10/26/2024  5:13 PM      Plan:     Rehabilitation  Functional deficits: impaired mobility, self care  Begin PT/OT/SLP.  Rehabilitation goals are to achieve a modified independent-supervision level with  mobility and self care.  Prognosis is fair-good.  ELOS is 10-14 days.  Estimated discharge is home.     DVT prophylaxis  Heparin    Pain  Acetaminophen as needed    Bladder plan  Continent    Bowel plan  Continent    Code Status  Level 1 full code        * CVA (cerebral vascular accident) (Formerly [facility])  Assessment & Plan  Patient with uncontrolled hypertension and diabetes presents with right upper and right lower extremity weakness as well as facial droop  A CT of the head as well as a CTA of the head and neck were completed with negative for acute abnormalities for an acute process  TNK had been administered following the correction of his hypertension after being placed on a Cardene drip  After ministration of the TNK developed worsening dysarthria as well as headache and a repeat CT was still negative.  The 24 post TNK CT was also negative  Neurology followed the patient and MRI was completed and was significant for left corona radiata stroke  Placed on dual antiplatelet therapy for 3 weeks with plans for monotherapy with aspirin and statin indefinitely for secondary stroke prophylaxis  Recommendation for a Zio patch as an outpatient  Follow-up with neurovascular attending in 6 to 8 weeks  Physical, occupational and speech therapy while on the acute rehabilitation unit  Initial examination shows significant right hemiparesis with some strength in the right lower extremity mostly distal.  Additionally there is dysarthria and facial droop as well as what seems to be spasticity in the right upper extremity although is difficult to range and is primarily at the end stages of the range of motion and unclear if potentially contracture.  Will hold off consideration for baclofen therapy due to the patient's somnolence    Impaired mobility and activities of daily living  Assessment & Plan  Patient was evaluated by the rehabilitation team MD and an appropriate candidate for acute inpatient rehabilitation program at this  time.  The patient will tolerate 3 hours/day 5 to 7 days/week of intensive physical, occupational and speech therapy in order to obtain goals for community discharge  Due to the patient's functional Compared to their baseline level of function in addition to their ongoing medical needs, the patient would benefit from daily supervision from a rehabilitation physician as well as rehabilitation nursing to implement and adjust the medical as well as functional plan of care in order to meet the patient's goals.      Encephalopathy  Assessment & Plan  Had lethargy on exam back on 10/17 in acute care with improvements however more recently had issues with confusion and decreased consciousness in the mornings that improves throughout the day with unclear etiology  Prior history of cognitive impairments with last MoCA score of 18  CT of the head was stable added B12 level most normal as well  Was evaluated by psychiatry with no changes in medications recommended  EEG showed no epileptiform activity just moderate nonspecific dysfunction  Will need to monitor especially with change in environment now on the ARC for any changes    Acute kidney injury superimposed on stage 4 chronic kidney disease (HCC)  Assessment & Plan  Lab Results   Component Value Date    EGFR 15 10/26/2024    EGFR 14 10/25/2024    EGFR 14 10/24/2024    CREATININE 4.04 (H) 10/26/2024    CREATININE 4.21 (H) 10/25/2024    CREATININE 4.31 (H) 10/24/2024     Recent creatinine of 4.04 as of 10/26  Etiology felt to be related potentially to ATN in the setting of uncontrolled hypertension and complicated by contrast associated nephropathy  Prior baseline around 2.9-3.0  Nephrology was consulted and followed and a UA showed microhematuria and proteinuria.  An ultrasound of the kidney/bladder showed wall thickening but no hydronephrosis  Bladder scans negative for retention  Monitor BMP biweekly or sooner if clinically indicated  Demadex has been on hold and had  periodically required IV fluids  Follow with nephrology as an outpatient or sooner on the ARC if any acute changes    DM2 (diabetes mellitus, type 2) ([facility])  Assessment & Plan  Lab Results   Component Value Date    HGBA1C 8.6 (H) 10/16/2024       Recent Labs     10/25/24  2005 10/26/24  0726 10/26/24  1146 10/26/24  1743   POCGLU 119 77 134 145*     Most recent hemoglobin A1c of 8.6 and technically uncontrolled although blood sugars in the hospital have been well-controlled  Currently on semaglutide as an outpatient but was on hold due to ROSINA in the hospital  Continue sliding scale and 4 times daily Accu-Cheks and as well as a diabetic diet      Hypertension  Assessment & Plan  Presented to the hospital significantly elevated blood pressure with systolic ranging from 197-231  Was placed on a Cardizem drip initially for hypertensive emergency  Was previously on Norvasc 10 mg daily as well as labetalol 100 mg twice daily  Neurology was consulted for BP management and Norvasc was discontinued and started nifedipine and increased as well as a increase to labetalol to 300 mg twice daily  Had been on Demadex 20 mg daily but was on hold due to the increasing creatinine  Goals for normotension  Follow-up with nephrology as an outpatient    Tardive dyskinesia  Assessment & Plan  Continue Ingrezza 40 mg at bedtime    Class 3 severe obesity due to excess calories with serious comorbidity and body mass index (BMI) of 40.0 to 44.9 in adult ([facility])  Assessment & Plan  Continue with exercise and promote lifestyle modification, weight loss counseling and management of medical conditions to optimize status    Hyperlipidemia  Assessment & Plan  Continue Lipitor 40 mg every evening    Anemia, unspecified  Assessment & Plan  Hemoglobin most recently of 10.4 which is up from 9.3 but has been hovering in the 10-11 range for most of admission  Continue to monitor with biweekly CBC or sooner if clinically indicated    DAISY (obstructive sleep  apnea)  Assessment & Plan  Continue CPAP with home settings  Ordered and compliant per records    Insomnia  Assessment & Plan  Continue trazodone and consider sleep logs    GERD (gastroesophageal reflux disease)  Assessment & Plan  Continue Protonix as well as as needed Tums    Bipolar I disorder, severe, current or most recent episode depressed, with psychotic features (HCC)  Assessment & Plan  History of chronic bipolar 1 disorder and follows with outpatient psychiatry and also was seen inpatient  Mood has been stable and is maintained on Zyprexa, bupropion and trazodone as well as Ingrezza for tar dive dyskinesia  Follow-up with psychiatry as an outpatient and consider virtual consultation if indicated for any changes while on ARC        Subjective/Interval Events:       Review of Systems   Constitutional:  Negative for chills and fever.   HENT:  Negative for hearing loss and trouble swallowing.    Eyes:  Negative for photophobia and visual disturbance.   Respiratory:  Negative for cough and shortness of breath.    Cardiovascular:  Negative for chest pain and leg swelling.   Gastrointestinal:  Negative for constipation, diarrhea, nausea and vomiting.   Endocrine: Negative for cold intolerance and heat intolerance.   Genitourinary:  Negative for dysuria and hematuria.   Musculoskeletal:  Positive for gait problem. Negative for back pain and neck pain.   Skin:  Negative for rash and wound.   Allergic/Immunologic: Negative for environmental allergies and food allergies.   Neurological:  Positive for weakness. Negative for dizziness, light-headedness and headaches.   Hematological:  Negative for adenopathy. Does not bruise/bleed easily.   Psychiatric/Behavioral:  Negative for dysphoric mood and sleep disturbance.          Function:  PRIOR LEVEL OF FUNCTION:  He lives in a(n) single family home  Tommie Taylor is  and lives with their family (spouse and 15 year old niece).  Self Care: Independent, Indoor  "Mobility: Independent, Stairs (in/outdoor): Independent, and Cognition: Independent  Prior to patient's admission, patient was fully Independent with ADLs and received assistance with IADLs. Spouse provides transportation. He was Independent without use of AD for mobility.     HOME ENVIRONMENT:  The living area: can live on one level with 1st floor bathroom and couch.  There are 2 steps vs ramp to enter the home, with full flight of stairs to 2nd floor of home.    The patient will not have 24 hour supervision/physical assistance available upon discharge.  Wife is primary support, but is limited in how much physical A she can provide. There is a 15 Y.O. niece who is also present, however, no one else can regularly assist.     Current level of function:  Physical Therapy: Max assist for transfers with no ambulation attempted  Occupational Therapy: Moderate assist for upper body ADLs and lower body ADLs  Speech Therapy: Regular and thin diet    Physical Exam:  /86 (BP Location: Left arm)   Pulse 65   Temp 97.6 °F (36.4 °C) (Tympanic)   Resp 20   Ht 5' 6\" (1.676 m)   Wt 120 kg (264 lb 12.8 oz)   SpO2 100%   BMI 42.74 kg/m²        Intake/Output Summary (Last 24 hours) at 10/27/2024 1014  Last data filed at 10/27/2024 0421  Gross per 24 hour   Intake 240 ml   Output 300 ml   Net -60 ml       Body mass index is 42.74 kg/m².      Physical Exam  Vitals reviewed.   Constitutional:       General: He is not in acute distress.     Appearance: He is obese.      Comments: Alert at times, somnolent without verbal stimuli   HENT:      Head:      Comments: Facial droop     Right Ear: External ear normal.      Left Ear: External ear normal.      Nose: Nose normal. No rhinorrhea.      Mouth/Throat:      Mouth: Mucous membranes are moist.      Pharynx: Oropharynx is clear.   Eyes:      General: No scleral icterus.  Cardiovascular:      Rate and Rhythm: Normal rate.      Pulses: Normal pulses.   Pulmonary:      Effort: " Pulmonary effort is normal. No respiratory distress.   Abdominal:      General: There is no distension.      Palpations: Abdomen is soft.   Musculoskeletal:      Right lower leg: No edema.      Left lower leg: No edema.   Skin:     General: Skin is warm and dry.   Neurological:      Mental Status: He is oriented to person, place, and time.      Comments: Significant dysarthria as well as right-sided hemiparesis.  In the right upper extremity the arm is 0/5 strength throughout and some level of spasticity primarily in the elbow flexor and internal rotators of the arm and MAS of 3.  The right lower extremity also weak without spasticity noted however there is 4/5 strength in the ankle dorsiflexor and plantar flexor, 2/5 strength in the knee extensor and/5 strength in the hip flexor on the right   Psychiatric:         Mood and Affect: Mood normal.         Behavior: Behavior normal.            Labs, medications, and imaging personally reviewed.    Laboratory:    Lab Results   Component Value Date    SODIUM 140 10/26/2024    K 3.8 10/26/2024     (H) 10/26/2024    CO2 24 10/26/2024    BUN 36 (H) 10/26/2024    CREATININE 4.04 (H) 10/26/2024    GLUC 81 10/26/2024    CALCIUM 8.5 10/26/2024     Lab Results   Component Value Date    WBC 7.65 10/21/2024    HGB 10.4 (L) 10/21/2024    HCT 31.9 (L) 10/21/2024    MCV 80 (L) 10/21/2024     10/21/2024     Lab Results   Component Value Date    INR 1.13 10/17/2024    INR 1.00 10/14/2024    PROTIME 15.2 (H) 10/17/2024    PROTIME 13.9 10/14/2024         Current Facility-Administered Medications:     acetaminophen (TYLENOL) tablet 650 mg, 650 mg, Oral, Q6H PRN, Alex George, DO, 650 mg at 10/26/24 1842    aspirin (ECOTRIN LOW STRENGTH) EC tablet 81 mg, 81 mg, Oral, Daily, Alex George, DO, 81 mg at 10/27/24 0824    atorvastatin (LIPITOR) tablet 40 mg, 40 mg, Oral, QPM, Alex George, DO, 40 mg at 10/26/24 1842    buPROPion (WELLBUTRIN) tablet 100 mg, 100 mg, Oral, BID, Alex  LUIS George, DO, 100 mg at 10/27/24 0824    calcium carbonate (TUMS) chewable tablet 1,000 mg, 1,000 mg, Oral, BID PRN, Alex George DO    clopidogrel (PLAVIX) tablet 75 mg, 75 mg, Oral, Daily, Alex Geogre DO, 75 mg at 10/27/24 0824    heparin (porcine) subcutaneous injection 7,500 Units, 7,500 Units, Subcutaneous, Q8H MIGEL, Alex George DO, 7,500 Units at 10/27/24 0300    hydrALAZINE (APRESOLINE) tablet 25 mg, 25 mg, Oral, Q8H MIGEL, Alex George DO, 25 mg at 10/27/24 0517    insulin lispro (HumALOG/ADMELOG) 100 units/mL subcutaneous injection 1-5 Units, 1-5 Units, Subcutaneous, HS, Alex George DO    insulin lispro (HumALOG/ADMELOG) 100 units/mL subcutaneous injection 1-6 Units, 1-6 Units, Subcutaneous, TID AC **AND** Fingerstick Glucose (POCT), , , 4x Daily AC and at bedtime, Alex George DO    labetalol (NORMODYNE) tablet 300 mg, 300 mg, Oral, BID, Alex George DO, 300 mg at 10/27/24 0825    NIFEdipine (PROCARDIA XL) 24 hr tablet 90 mg, 90 mg, Oral, Daily, Alex George DO, 90 mg at 10/27/24 0824    OLANZapine (ZyPREXA) tablet 10 mg, 10 mg, Oral, HS, Alex George DO, 10 mg at 10/26/24 2132    pantoprazole (PROTONIX) EC tablet 40 mg, 40 mg, Oral, Early Morning, Alex George DO, 40 mg at 10/27/24 0517    polyethylene glycol (MIRALAX) packet 17 g, 17 g, Oral, Daily PRN, Alex George DO    traZODone (DESYREL) tablet 50 mg, 50 mg, Oral, HS PRN, Alex George DO    Valbenazine Tosylate CAPS 40 mg, 40 mg, Oral, HS, Alex George DO, 40 mg at 10/26/24 2132  Allergies   Allergen Reactions    Pollen Extract Nasal Congestion    Tetanus Toxoid Swelling      Patient Active Problem List    Diagnosis Date Noted    CVA (cerebral vascular accident) ([facility]) 10/15/2024    Impaired mobility and activities of daily living 10/26/2024    Stroke-like symptoms 10/22/2024    Acute kidney injury superimposed on stage 4 chronic kidney disease (HCC) 10/17/2024    Encephalopathy 10/17/2024    Volume overload 10/17/2024     Acid-base disorder, mixed 10/17/2024    Type 2 diabetes mellitus with stage 4 chronic kidney disease and hypertension ([facility]) 10/08/2024    Vision decreased 06/05/2024    Memory deficit 04/15/2024    Tremor 04/15/2024    B12 deficiency 04/15/2024    Hypertension 11/27/2023    Tardive dyskinesia 08/16/2023    Lightheaded 08/02/2023    Loss of appetite 08/02/2023    Unintended weight loss 08/02/2023    Class 3 severe obesity due to excess calories with serious comorbidity and body mass index (BMI) of 40.0 to 44.9 in adult ([facility]) 03/01/2023    Vitamin D deficiency 04/09/2021    Hyperlipidemia 02/11/2020    Toenail deformity 11/01/2019    Persistent proteinuria 08/28/2019    Anemia, unspecified 08/28/2019    Elevated serum creatinine 04/17/2019    Stage 4 chronic kidney disease ([facility]) 04/17/2019    DAISY (obstructive sleep apnea)     Generalized anxiety disorder 06/19/2018    Hiatal hernia 06/17/2018    Lower esophageal ring ([place]) 06/17/2018    Insomnia 05/14/2018    Bipolar I disorder, severe, current or most recent episode depressed, with psychotic features ([facility]) 11/06/2017    Panic attacks 11/06/2017    GERD (gastroesophageal reflux disease) 09/05/2017    Flat foot 05/03/2016    Diabetic polyneuropathy ([facility]) 12/11/2014    DM2 (diabetes mellitus, type 2) ([facility]) 12/11/2014    Allergic rhinitis 08/31/2012     Past Medical History:   Diagnosis Date    ADHD, adult residual type     Anxiety     Anxiety     Bipolar 1 disorder ([facility])     Cataract     Left eye    Chronic kidney disease     Colon polyp     CPAP (continuous positive airway pressure) dependence     Depression     Depression     Diabetes mellitus ([facility])     blood sugar 167 on admission    Equinus deformity of foot     GERD (gastroesophageal reflux disease)     Homicidal ideations     Hyperlipidemia     Hypertension     Microalbuminuria     Morbid obesity ([facility])     Neuropathy     Shortness of breath     Sleep apnea     CPAP at bedtime    Sleep apnea     Stroke ([facility])   "   Suicidal intent      Past Surgical History:   Procedure Laterality Date    CATARACT EXTRACTION      CATARACT EXTRACTION      COLONOSCOPY      HAND SURGERY Right     OTHER SURGICAL HISTORY      REPAIR OF SUPERFICIAL WOUND FACE    MT ESOPHAGOGASTRODUODENOSCOPY TRANSORAL DIAGNOSTIC N/A 2018    Procedure: ESOPHAGOGASTRODUODENOSCOPY (EGD);  Surgeon: Yinka Maier MD;  Location: [facility];  Service: Gastroenterology    MT ESOPHAGOGASTRODUODENOSCOPY TRANSORAL DIAGNOSTIC N/A 2018    Procedure: EGD AND COLONOSCOPY;  Surgeon: Yinka Maier MD;  Location: [facility];  Service: Gastroenterology     Social History     Socioeconomic History    Marital status: /Civil Union     Spouse name: Not on file    Number of children: 1    Years of education: Not on file    Highest education level: Not on file   Occupational History    Occupation: On disability   Tobacco Use    Smoking status: Former     Current packs/day: 0.00     Types: Cigarettes     Quit date:      Years since quittin.8    Smokeless tobacco: Current     Types: Chew    Tobacco comments:     pt \"Quit after approx over 25 years ago\"   Vaping Use    Vaping status: Never Used   Substance and Sexual Activity    Alcohol use: Not Currently     Comment: rarely    Drug use: Not Currently     Comment: quit 20 years ago    Sexual activity: Yes     Partners: Female     Birth control/protection: None     Comment: steady girlfriend   Other Topics Concern    Not on file   Social History Narrative     from spouse, technically still  but living with other partner, partner's father, and early 2019, his partner's daughter and boyfriend moved in with their two children.  Then the boyfriend moved out in 2019.   Then partner's daughter moved out approx 2021.  (Pt's partner has guardianship of the grandchildren per Pt).        Children: 1 stepdaughter         Education:    Pt denies any hx of learning disabilities and reached childhood " "milestones on time as far as he knows.  He wore braces for equinovarus but states he did not suffer delay in walking    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 because \"I was just lazy, didn't do the work.\"    No college    Took some core classes in [employer] and worked as a volunteer  from approx [phone]             Substance Abuse History:     Pt drinks ETOH approx q 3-4 months but denies any h/o ETOH abuse.    Pt tried smoking Crack once in the past and has been smoking THC once q 2-3 months since 12y/o.     He denies other drug use, IVDA, addictions or drug abuse.         Social Determinants of Health     Financial Resource Strain: Low Risk  (4/15/2024)    Overall Financial Resource Strain (CARDIA)     Difficulty of Paying Living Expenses: Not hard at all   Food Insecurity: No Food Insecurity (10/26/2024)    Nursing - Inadequate Food Risk Classification     Worried About Running Out of Food in the Last Year: Never true     Ran Out of Food in the Last Year: Never true     Ran Out of Food in the Last Year: 1   Transportation Needs: No Transportation Needs (10/26/2024)    Nursing - Transportation Risk Classification     Lack of Transportation: Not on file     Lack of Transportation: 2   Physical Activity: Inactive (9/14/2021)    Exercise Vital Sign     Days of Exercise per Week: 0 days     Minutes of Exercise per Session: 0 min   Stress: No Stress Concern Present (9/14/2021)    [employer] [place] [employer] - Occupational Stress Questionnaire     Feeling of Stress : Not at all   Social Connections: Moderately Isolated (9/14/2021)    Social Connection and Isolation Panel [NHANES]     Frequency of Communication with Friends and Family: More than three times a week     Frequency of Social Gatherings with Friends and Family: Once a week     Attends Confucianism Services: Never     Active Member of Clubs or Organizations: No     Attends Club or Organization Meetings: Never     Marital " "Status: Living with partner   Intimate Partner Violence: Unknown (10/26/2024)    Nursing IPS     Feels Physically and Emotionally Safe: Not on file     Physically Hurt by Someone: Not on file     Humiliated or Emotionally Abused by Someone: Not on file     Physically Hurt by Someone: 2     Hurt or Threatened by Someone: 2   Housing Stability: Unknown (10/26/2024)    Nursing: Inadequate Housing Risk Classification     Has Housing: Not on file     Worried About Losing Housing: Not on file     Unable to Get Utilities: Not on file     Unable to Pay for Housing in the Last Year: 2     Has Housin     Social History     Tobacco Use   Smoking Status Former    Current packs/day: 0.00    Types: Cigarettes    Quit date:     Years since quittin.8   Smokeless Tobacco Current    Types: Chew   Tobacco Comments    pt \"Quit after approx over 25 years ago\"     Social History     Substance and Sexual Activity   Alcohol Use Not Currently    Comment: rarely     Family History   Problem Relation Age of Onset    Diabetes Mother     Alcohol abuse Father     Diabetes Father     Hypertension Father     Lung cancer Father     Stroke Father     Cancer Father         lung    Alcohol abuse Sister     Depression Sister     Lymphoma Sister     Alcohol abuse Family     Alcohol abuse Sister     Alcohol abuse Sister     Cancer Sister     Heart disease Neg Hx     Thyroid disease Neg Hx          Medical Necessity Criteria for [facility] Admission: Acute Kidney Injury, Chronic Kidney Disease, Anemia, with the following plan: monitor H/H, Hypertension, Bowel/Bladder Management, Diabetes requiring close blood glucose monitoring, and Delirium/Agitation/Encephalopathy. In addition, the preadmission screen, post-admission physical evaluation, overall plan of care and admissions order demonstrate a reasonable expectation that the following criteria were met at the time of admission to the [facility].  The patient requires active and ongoing therapeutic " intervention of multiple therapy disciplines (physical therapy, occupational therapy, speech-language pathology, or prosthetics/orthotics), one of which is physical or occupational therapy.    Patient requires an intensive rehabilitation therapy program, as defined in Chapter 1, section 110.2.2 of the CMS Medicare Policy Manual. This intensive rehabilitation therapy program will consist of at least 3 hours of therapy per day at least 5 days per week or at least 15 hours of intensive rehabilitation therapy within a 7 consecutive day period, beginning with the date of admission to the [facility].    The patient is reasonably expected to actively participate in, and benefit significantly from, the intensive rehabilitation therapy program as defined in Chapter 1, section 110.2.2 of the CMS Medicare Policy Manual at this time of admission to the [facility]. He can reasonably be expected to make measurable improvement (that will be of practical value to improve the patient’s functional capacity or adaptation to impairments) as a result of the rehabilitation treatment, as defined in section 110.3, and such improvement can be expected to be made within the prescribed period of time. As noted in the CMS Medicare Policy Manual, the patient need not be expected to achieve complete independence in the domain of self-care nor be expected to return to his or her prior level of functioning in order to meet this standard.  The patient must require physician supervision by a rehabilitation physician. As such, a rehabilitation physician will conduct face-to-face visits with the patient at least 3 days per week throughout the patient’s stay in the [facility] to assess the patient both medically and functionally, as well as to modify the course of treatment as needed to maximize the patient’s capacity to benefit from the rehabilitation process.  The patient requires an intensive and coordinated interdisciplinary approach to providing rehabilitation, as  defined in Chapter 1, section 110.2.5 of the CMS Medicare Policy Manual. This will be achieved through periodic team conferences, conducted at least once in a 7-day period, and comprising of an interdisciplinary team of medical professionals consisting of: a rehabilitation physician, registered nurse,  and/or , and a licensed/certified therapist from each therapy discipline involved in treating the patient.     Changes Since Pre-admission Assessment: None -This patient's participation in rehab continues to be reasonable, necessary and appropriate.    CMS Required Post-Admission Physician Evaluation Elements  History and Physical, including medical history, functional history and active comorbidities as in above text.     Post-Admission Physician Evaluation:  The patient has the potential to make improvement and is in need of physical, occupational, and/or therapy services. The patient may also need nutritional services. Given the patient's complex medical condition and risk of further medical complications, rehabilitative services cannot be safely provided at a lower level of care, such as a skilled nursing facility. I have reviewed the patient's functional and medical status at the time of the preadmission screening and they are the same as on the day of this admission. I acknowledge that I have personally performed a full physical examination on this patient within 24 hours of admission. The patient and/or family demonstrated understanding the rehabilitation program and the discharge process after we discussed them.     Agree in entirety: yes  Minor adaptions: none    Major changes: none    Alex George, DO  Physical Medicine and Rehabilitation  [facility]    I have spent a total time of 85 minutes in caring for this patient on the day of the visit/encounter including Instructions for management, Importance of tx compliance, Risk factor reductions, Impressions,  Counseling / Coordination of care, Documenting in the medical record, Reviewing / ordering tests, medicine, procedures  , Obtaining or reviewing history  , and Communicating with other healthcare professionals .      ** Please Note: Fluency Direct voice to text software may have been used in the creation of this document. **

## 2024-10-26 NOTE — ASSESSMENT & PLAN NOTE
Had lethargy on exam back on 10/17 in acute care with improvements however more recently had issues with confusion and decreased consciousness in the mornings that improves throughout the day with unclear etiology  Prior history of cognitive impairments with last MoCA score of 18  CT of the head was stable, B12 level (446) wnl  Was evaluated by psychiatry with no changes in medications recommended  EEG showed no epileptiform activity just moderate nonspecific dysfunction  Will need to monitor especially with change in environment now on the ARC for any changes

## 2024-10-26 NOTE — ASSESSMENT & PLAN NOTE
Lab Results   Component Value Date    HGBA1C 8.6 (H) 10/16/2024     Recent Labs     10/25/24  2005 10/26/24  0726 10/26/24  1146 10/26/24  1743   POCGLU 119 77 134 145*   Blood Sugar Average: Last 72 hrs:  (P) 111.7210449161261626  A1c reflects poor control but blood sugars have been stable here on sliding scale alone  Currently maintained on semaglutide monotherapy outpatient- holding due to ROSINA.  SSI with accu checks  Hypoglycemia protocol  Diabetic diet  Outpatient follow-up with PCP.

## 2024-10-26 NOTE — ASSESSMENT & PLAN NOTE
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  This is likely contaminant and does not require further antibiotic treatment  No clear source of infection, consider possibility of aspiration given new dysphagia from stroke  CT C/A/P wo contrast given ROSINA. No evidence of acute infectious process  After patient was noted to be afebrile 24 hours and no clear source of infection,  IV cefepime and vancomycin were discontinued 10/20/24.  Continue to monitor.  resolved

## 2024-10-26 NOTE — ASSESSMENT & PLAN NOTE
Patient with history of morbid obesity, hypertension and uncontrolled diabetes who presented with right upper and right lower extremity weakness with associated right facial droop  CT/CTA head unremarkable for acute process  TnK administered following correction of hypertension with nicardipine drip in the ICU  Following thrombolytic administration patient developed worsening slurred speech and headache, had repeat CT head which was unremarkable  Repeat head CT at 24 hours post TNK negative for signs of bleed  Neurology consult  MRI significant for left corona radiata stroke.  Placed on DAPT therapy for 21 days and then continue aspirin monotherapy indefinitely per neurology.  Serial repeat imaging during this hospitalization has remained stable  Started on Lipitor 40 mg per stroke protocol  Recommend Zio patch outpatient to evaluate for any possible arrhythmia  Will need f/u with neurovascular team in 6-8 weeks   PT/OT recommending level 1-- for acute rehab placement today.  Outpatient follow-up with neurologist and primary care physician.

## 2024-10-26 NOTE — ASSESSMENT & PLAN NOTE
Hemoglobin most recently of 10.4 which is up from 9.3 but has been hovering in the 10-11 range for most of admission  Continue to monitor with biweekly CBC or sooner if clinically indicated

## 2024-10-26 NOTE — TREATMENT PLAN
Renal on call note    Please call renal team if questions or issus arise today. See full renal recs from Dr Lombardo's note yesterday. No labs today noted. Pt was being planned for discharge today per review. Vitals reviewed and stable.

## 2024-10-26 NOTE — ASSESSMENT & PLAN NOTE
Lab Results   Component Value Date    HGBA1C 8.6 (H) 10/16/2024     Recent Labs     10/25/24  1132 10/25/24  1615 10/25/24  2005 10/26/24  0726   POCGLU 94 123 119 77   Blood Sugar Average: Last 72 hrs:  (P) 109.1590328032702694  A1c reflects poor control but blood sugars have been stable here on sliding scale alone  Currently maintained on semaglutide monotherapy outpatient- holding  SSI with accu checks  Hypoglycemia protocol  Diabetic diet

## 2024-10-26 NOTE — PHYSICAL THERAPY NOTE
PHYSICAL THERAPY NOTE          Patient Name: Tommie Taylor  Today's Date: 10/26/2024             10/26/24 1100   PT Last Visit   PT Visit Date 10/26/24   Note Type   Note Type Treatment   Pain Assessment   Pain Assessment Tool 0-10   Pain Score No Pain   Restrictions/Precautions   Weight Bearing Precautions Per Order No   Other Precautions Cognitive;Chair Alarm;Bed Alarm;Fall Risk;Pain   General   Chart Reviewed Yes   Response to Previous Treatment Patient with no complaints from previous session.   Family/Caregiver Present No   Cognition   Overall Cognitive Status Impaired   Arousal/Participation Alert;Responsive;Cooperative   Attention Attends with cues to redirect   Orientation Level Oriented X4   Memory Decreased recall of recent events;Decreased recall of precautions;Decreased short term memory   Following Commands Follows one step commands with increased time or repetition   Comments pt continues to be cooperative throughout PT intervention   Subjective   Subjective pt stated no pain in todays tx session and was agreeable to participate in PT intervention   Bed Mobility   Supine to Sit 3  Moderate assistance   Additional items Assist x 1;HOB elevated;Bedrails;Increased time required;Verbal cues;Other  (trunk management)   Sit to Supine Unable to assess   Additional Comments pt seated OOB in the recliner post tx session with call bell, chair alarm activated, legs elevated and all pt needs met   Transfers   Sit to Stand 2  Maximal assistance   Additional items Assist x 1;Increased time required;Verbal cues   Stand to Sit 2  Maximal assistance   Additional items Assist x 1;Armrests;Increased time required;Verbal cues   Stand pivot 2  Maximal assistance   Additional items Assist x 1;Armrests;Increased time required;Verbal cues   Additional Comments pt continues to require max ax1 for all functional transfers with SBQC towards pt L  side   Ambulation/Elevation   Gait pattern Not appropriate;Not tested   Balance   Static Sitting Fair -   Dynamic Sitting Poor +   Static Standing Poor -   Dynamic Standing Poor -   Ambulatory Zero   Endurance Deficit   Endurance Deficit Yes   Endurance Deficit Description limited bed mobility, functional mobility, standing tolerance and ambulation   Activity Tolerance   Activity Tolerance Patient limited by fatigue;Other (Comment)  (generalized weakness)   Nurse Made Aware Spoke to RN   Exercises   Hip Flexion Sitting;AAROM;PROM;Right;15 reps   Hip Abduction Sitting;15 reps;AROM;Bilateral   Hip Adduction Sitting;15 reps;AROM;Bilateral  (pillow squeezes)   Knee AROM Short Arc Quad Supine;15 reps;PROM;Right   Knee AROM Long Arc Quad Sitting;10 reps;AROM;Right   Ankle Pumps Sitting;20 reps;AROM;Bilateral   Assessment   Prognosis Good   Problem List Decreased strength;Decreased range of motion;Impaired balance;Decreased endurance;Decreased mobility;Decreased coordination;Decreased safety awareness;Obesity   Assessment pt began tx session lying supine in the bed as pt was agreeable to participate in PT intervention. PT to focus on bed mobility, transfer training, TE activities and increasing pt activity tolerance/endurance. In comparison to previous tx session pt required an increase in level of assistance required for completing a supine<>sit EOB transfer as pt required mod ax1 w/ trunk management. pt was able to sit EOB w/o LOB while being educated on functional transfers w/ SBQC use in LUE. pt continues to require max ax1 for all functional transfers towards his L side with SBQC. pt required constant Vc's for lateral stepping and tactile cues for RLE. While seated in the recliner pt was able to tolerate and participate in TE activities >8 minutes. Post tx pt in the recliner with legs elevated, chair alarm activated and all pt needs met. pt continues to be at an increased risk for falls and injuries with all OOB  activities at thius time and would benefit from continued skilled PT intervention oin order to increase pt safety w/ all OOB activities. Continue to recommend Dc w/ level 1 maximal rehab resource intensity when medically cleared   Goals   Patient Goals none stated   STG Expiration Date 10/29/24   PT Treatment Day 6   Plan   Treatment/Interventions Functional transfer training;LE strengthening/ROM;Therapeutic exercise;Endurance training;Patient/family training;Equipment eval/education;Bed mobility;Spoke to nursing;Compensatory technique education;Gait training   Progress No functional improvements   PT Frequency 4-6x/wk   Discharge Recommendation   Rehab Resource Intensity Level, PT I (Maximum Resource Intensity)   AM-PAC Basic Mobility Inpatient   Turning in Flat Bed Without Bedrails 3   Lying on Back to Sitting on Edge of Flat Bed Without Bedrails 2   Moving Bed to Chair 2   Standing Up From Chair Using Arms 2   Walk in Room 1   Climb 3-5 Stairs With Railing 1   Basic Mobility Inpatient Raw Score 11   Basic Mobility Standardized Score 30.25   [facility] Highest Level Of Mobility   -[facility] Goal 4: Move to chair/commode   [facility] Achieved 4: Move to chair/commode   Education   Education Provided Other  (bed mobility, functional transfers)   Patient Reinforcement needed   End of Consult   Patient Position at End of Consult Bedside chair;Bed/Chair alarm activated;All needs within reach   The patient's AM-PAC Basic Mobility Inpatient Short Form Raw Score is 11. A Raw score of less than or equal to 16 suggests the patient may benefit from discharge to post-acute rehabilitation services. Please also refer to the recommendation of the Physical Therapist for safe discharge planning.    Lisandro Bell

## 2024-10-26 NOTE — ASSESSMENT & PLAN NOTE
Patient with uncontrolled hypertension and diabetes presents with right upper and right lower extremity weakness as well as facial droop  A CT of the head as well as a CTA of the head and neck were completed with negative for acute abnormalities for an acute process  TNK had been administered following the correction of his hypertension after being placed on a Cardene drip  After ministration of the TNK developed worsening dysarthria as well as headache and a repeat CT was still negative.  The 24 post TNK CT was also negative  Neurology followed the patient and MRI was completed and was significant for left corona radiata stroke  Placed on dual antiplatelet therapy for 3 weeks with plans for monotherapy with aspirin and statin indefinitely for secondary stroke prophylaxis  Recommendation for a Zio patch as an outpatient  Follow-up with neurovascular attending in 6 to 8 weeks  Physical, occupational and speech therapy while on the acute rehabilitation unit

## 2024-10-26 NOTE — ASSESSMENT & PLAN NOTE
Presented to the hospital significantly elevated blood pressure with systolic ranging from 197-231  Was placed on a Cardizem drip initially for hypertensive emergency  Was previously on Norvasc 10 mg daily as well as labetalol 100 mg twice daily  Neurology was consulted for BP management and Norvasc was discontinued and started nifedipine and increased as well as a increase to labetalol to 300 mg twice daily  Had been on Demadex 20 mg daily but was on hold due to the increasing creatinine  Goals for normotension  Mgmt per IM  Follow-up with nephrology as an outpatient

## 2024-10-26 NOTE — ASSESSMENT & PLAN NOTE
Continue with exercise and promote lifestyle modification, weight loss counseling and management of medical conditions to optimize status

## 2024-10-26 NOTE — PROGRESS NOTES
PHYSICAL MEDICINE AND REHABILITATION   PREADMISSION ASSESSMENT     Projected IGC and Rehabilitation Diagnoses:  Impairment of mobility, safety, Activities of Daily Living (ADLs), and cognitive/communication skills due to Stroke:  01.2  Right Body Involvement (Left Brain)  Etiologic Dx: Left Corona Radiata Infarct  Date of Onset: 10/14/2024   Date of surgery: N/A    PATIENT INFORMATION  Name: Tommie Taylor Phone #: [phone] (home)   [place] [facility] [address] [place] [address]  YOB: 1965 Age: 59 y.o. #   Marital Status: /Civil Union  Ethnicity:   Employment Status:  unemployed  Extended Emergency Contact Information  Primary Emergency Contact: Marie Sevilla  Address: [facility] [address] [address] [facility]  Home Phone: [phone]  Mobile Phone: [phone]  Relation: Spouse  Advance Directive: Level 1 Full Code - unknown advanced directive    INSURANCE/COVERAGE:     Primary Payor: MEDICARE / Plan: MEDICARE A AND B / Product Type: Medicare A & B Fee for Service /   Secondary Payer: [facility]   Payer Contact:  Payer Contact:   Contact Phone:  Contact Phone:     MEDICARE #: [id number]   Medicare Days: 60/30/60  Medical Record #: [id number]    REFERRAL SOURCE:   Referring provider: Ale Saba*  Referring facility: [facility]  Room: S /S -01  PCP: No primary care provider on file. PCP phone number: None    MEDICAL INFORMATION  HPI: Patient is a 59 year old male that presented to [facility] on 10/14/2024 via EMS as prehospital stroke alert d/t fall following episode of dizziness and sudden onset right sided weakness. NIHSS of 8, and was administered TNK after BP controlled s/p IV Labetalol and Cardene gtt. CT brain was negative. CTA H/N was unremarkable. Neurology was consulted, and patient was admitted to stroke pathway. Following TNK,  patient developed worsening dysarthria and headache. Repeat CTH was unremarkable. Statin dose was increased. Patient underwent SLP evaluation, which showed mild-moderate oropharyngeal dysphagia, and he was recommended for Dysphagia 1 pureed diet with Nectar thick liquids, aspiration precautions. 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 ECHO showed EF 70%, moderate concentric hypertrophy, grade 2 diastolic dysfunction, mild AVR. Per Neurology, plan for DAPT (ASA and Plavix) for 21 days, then ASA monotherapy. Recommended for outpatient Ziopatch/Holter monitor at D/C. CVA likely d/t uncontrolled HTN, working towards goal normotension. Nephrology was consulted re: ROSINA, and patient was initiated on PO diuretics. Renal US and CT C/A/P were both negative for acute findings. Patient's creatinine levels remain increased, however appears to be plateauing per Nephrology. On 10/22, patient with confusion in AM. Repeat CTH was stable, and EEG was negative for seizure activity. Psychiatry was consulted re: medications, given history of ADHD, bipolar disorder and tardive dyskinesis, with no changes made at this time. On 10/23, Torsemide was held, and patient was administered IV fluids d/t increase in creat level. Patient again with noted lethargy and unable to maintain conversation. Repeat MRI brain was stable. Per Neurology, concern confusion/lethargy likely r/t untreated sleep apnea and delirium in setting of cog impairment at baseline, with no further recommendations/workup at this time. Patient was initiated on CPAP at .  As of 10/25, patient much more awake, alert and interactive.  Still with dysarthria.  Patient is overall hemodynamically stable and medically cleared for discharge to [facility]. PT/OT/ST therapies and PM&R were consulted, and they are recommending patient for inpatient Acute Rehab. He has demonstrated that he can tolerate and participate in 3 hours of therapy per day.    Received both Pfizer  vaccines.    Past Medical History:   Past Surgical History:   Allergies:     Past Medical History:   Diagnosis Date    ADHD, adult residual type     Anxiety     Anxiety     Bipolar 1 disorder (HCC)     Cataract     Left eye    Chronic kidney disease     Colon polyp     CPAP (continuous positive airway pressure) dependence     Depression     Depression     Diabetes mellitus (HCC)     blood sugar 167 on admission    Equinus deformity of foot     GERD (gastroesophageal reflux disease)     Homicidal ideations     Hyperlipidemia     Hypertension     Microalbuminuria     Morbid obesity (HCC)     Neuropathy     Shortness of breath     Sleep apnea     CPAP at bedtime    Sleep apnea     Stroke (HCC)     Suicidal intent     Past Surgical History:   Procedure Laterality Date    CATARACT EXTRACTION      CATARACT EXTRACTION      COLONOSCOPY      HAND SURGERY Right     OTHER SURGICAL HISTORY      REPAIR OF SUPERFICIAL WOUND FACE    DC ESOPHAGOGASTRODUODENOSCOPY TRANSORAL DIAGNOSTIC N/A 06/14/2018    Procedure: ESOPHAGOGASTRODUODENOSCOPY (EGD);  Surgeon: Yinka Maier MD;  Location: [facility];  Service: Gastroenterology    DC ESOPHAGOGASTRODUODENOSCOPY TRANSORAL DIAGNOSTIC N/A 09/12/2018    Procedure: EGD AND COLONOSCOPY;  Surgeon: Yinka Maier MD;  Location: [facility];  Service: Gastroenterology     Allergies   Allergen Reactions    Pollen Extract Nasal Congestion    Tetanus Toxoid Swelling         Medical/functional conditions requiring inpatient rehabilitation: left corona radiata infarct, vertigo, right sided weakness, right facial droop, dysarthria, dysphagia, encephalopathy, HTN, anemia, DM, sleep apnea with CPAP, impaired mobility and self care, impaired cognition, decreased strength/endurance, impaired balance, decreased safety awareness     Risk for medical/clinical complications: risk for falls, risk for skin breakdown, risk for aspiration, risk for infection, risk for DVT/PE, risk for respiratory distress, risk for  additional neurologic event, risk for hypo/hypertensive episodes, risk for hypo/hyperglycemia episodes    Comorbidities/Surgeries in the last 100 days:  ADHD, anxiety, bipolar disorder, CKD4, CVA, tardive dyskinesia, depression, DM, GERD, HLD, HTN, neuropathy, sleep apnea with CPAP    CURRENT VITAL SIGNS:   Temp:  [97.9 °F (36.6 °C)-98.7 °F (37.1 °C)] 97.9 °F (36.6 °C)  HR:  [63-77] 68  BP: (123-155)/(66-90) 148/84  Resp:  [18] 18  SpO2:  [98 %-100 %] 100 %  O2 Device: None (Room air)   Intake/Output Summary (Last 24 hours) at 10/26/2024 1156  Last data filed at 10/26/2024 0621  Gross per 24 hour   Intake 960 ml   Output 1271 ml   Net -311 ml        LABORATORY RESULTS:      Lab Results   Component Value Date    HGB 10.4 (L) 10/21/2024    HGB 12.1 12/15/2015    HCT 31.9 (L) 10/21/2024    HCT 35.6 (L) 12/15/2015    WBC 7.65 10/21/2024    WBC 6.44 12/15/2015     Lab Results   Component Value Date    BUN 36 (H) 10/26/2024    BUN 6 12/15/2015     12/15/2015    K 3.8 10/26/2024    K Unable to analyze due to hemolysis 12/15/2015     (H) 10/26/2024     12/15/2015    GLUCOSE 315 (H) 12/15/2015    CREATININE 4.04 (H) 10/26/2024    CREATININE 1.16 12/15/2015     Lab Results   Component Value Date    PROTIME 15.2 (H) 10/17/2024    INR 1.13 10/17/2024        DIAGNOSTIC STUDIES:  CT chest abdomen pelvis wo contrast    Result Date: 10/20/2024  Impression: No acute findings in the chest, abdomen, or pelvis to explain the patient's fevers. Increased density within the gallbladder likely vicarious excretion of contrast in this patient with recent previous contrast administration. Resident: MARTI SANCHEZ I, the attending radiologist, have reviewed the images and agree with the final report above. Workstation performed: [id number]     US kidney and bladder    Result Date: 10/19/2024  Impression: No hydronephrosis. Mild diffuse bladder wall thickening should be correlated with urinalysis. Workstation performed:  [id number]     XR chest portable    Result Date: 10/18/2024  Impression: No acute cardiopulmonary disease. Workstation performed: [id number]     CT head wo contrast    Result Date: 10/17/2024  Impression: No acute intracranial abnormality.  Chronic microangiopathic changes. Left corona radiata infarction as previously characterized. Workstation performed: [id number]     MRI brain wo contrast    Result Date: 10/16/2024  Impression: Acute left corona radiata infarct. Other nonemergent findings above. Workstation performed: [id number]     CT head wo contrast    Result Date: 10/16/2024  Impression: No acute intracranial hemorrhage. No significant interval change. Consider MRI with diffusion weighted imaging for further evaluation, if there are no contraindications. Workstation performed: [id number]     CT head without contrast    Result Date: 10/15/2024  Impression: No acute intracranial abnormality. Workstation performed: [id number]     CT stroke alert brain    Result Date: 10/14/2024  Impression: No acute intracranial abnormality.  Stable chronic microangiopathic changes within the brain. Findings were directly discussed with Renae Albert at 11:18 p.m on 10/14/2024. Workstation performed: [id number]     CTA stroke alert (head/neck)    Result Date: 10/14/2024  Impression: Unremarkable CTA neck and brain. Findings were directly discussed with Renae Albert at 11:18 p.m on 10/14/2024. Workstation performed: [id number]     Colonoscopy    Result Date: 10/14/2024  Impression: Incomplete procedure due to inadequate bowel prep.  Solid balls of stool mixed with thick brown liquid noted in the rectum therefore the exam was terminated. RECOMMENDATION: Repeat colonoscopy in 3 months, due: 1/12/2025 Inadequate bowel preparation Incomplete procedure  Recommend 2-day bowel prep for future procedure. Trulicity will need to be held prior to the procedure. Will need to avoid fibrous materials 5 days prior to repeat colonoscopy.   "Ailyn Steen MD     EGD    Result Date: 10/14/2024  Impression: Grade B esophagitis in the lower third of the esophagus Irregular Z-line; performed cold forceps biopsy The duodenal bulb, 1st part of the duodenum and 2nd part of the duodenum appeared normal. Performed random biopsy to rule out celiac disease. Mild erythematous mucosa in the prepyloric region; performed cold forceps biopsy 2 cm type I hiatal hernia RECOMMENDATION: Await pathology results Follow biopsy results in 2 weeks. Recommend starting pantoprazole 40 mg on daily basis and continue this indefinitely if biopsies confirm Tolbert's esophagus. Avoid NSAIDs. Follow GERD diet.   Ailyn Steen MD       PRECAUTIONS/SPECIAL NEEDS:  Tobacco:   Social History     Tobacco Use   Smoking Status Former    Current packs/day: 0.00    Types: Cigarettes    Quit date:     Years since quittin.8   Smokeless Tobacco Current    Types: Chew   Tobacco Comments    pt \"Quit after approx over 25 years ago\"   , Alcohol:    Social History     Substance and Sexual Activity   Alcohol Use Not Currently    Comment: rarely   , Anticoagulation:  aspirin 81 mg orally every day, clopidogrel 75 mg orally every day, and heparin, Blood Sugar Management: as per MD recommendations, Edema Management, Safety Concerns, Pain Management, Aspiration Risk/Precautions, Dietary Restrictions: Diabetic diet with thin liquids, CPAP @ HS, Language Preference: English, and Fall Precautions.    MEDICATIONS:     Current Facility-Administered Medications:     acetaminophen (TYLENOL) tablet 650 mg, 650 mg, Oral, Q6H PRN, Macey Almeida PA-C, 650 mg at 10/17/24 1422    aspirin (ECOTRIN LOW STRENGTH) EC tablet 81 mg, 81 mg, Oral, Daily, Rose Miner MD, 81 mg at 10/26/24 0919    atorvastatin (LIPITOR) tablet 40 mg, 40 mg, Oral, QPM, Rose Miner MD, 40 mg at 10/25/24 1707    buPROPion (WELLBUTRIN) tablet 100 mg, 100 mg, Oral, BID, Rose Miner MD, 100 mg at 10/26/24 0919    calcium " carbonate (TUMS) chewable tablet 1,000 mg, 1,000 mg, Oral, BID PRN, Leslie S Pretty, CRNP, 1,000 mg at 10/22/24 2226    chlorhexidine (PERIDEX) 0.12 % oral rinse 15 mL, 15 mL, Mouth/Throat, Q12H MIGEL, Rose Miner MD, 15 mL at 10/26/24 0919    clopidogrel (PLAVIX) tablet 75 mg, 75 mg, Oral, Daily, Rose Miner MD, 75 mg at 10/26/24 0919    heparin (porcine) subcutaneous injection 7,500 Units, 7,500 Units, Subcutaneous, Q8H MIGEL, Rose Miner MD, 7,500 Units at 10/26/24 0920    hydrALAZINE (APRESOLINE) injection 10 mg, 10 mg, Intravenous, Q6H PRN, Rose Miner MD, 10 mg at 10/17/24 1430    hydrALAZINE (APRESOLINE) tablet 25 mg, 25 mg, Oral, Q8H MIGEL, Johny Lombardo MD, 25 mg at 10/26/24 0612    insulin lispro (HumALOG/ADMELOG) 100 units/mL subcutaneous injection 2-12 Units, 2-12 Units, Subcutaneous, TID AC, 4 Units at 10/23/24 1728 **AND** Fingerstick Glucose (POCT), , , TID AC, Rose Miner MD    labetalol (NORMODYNE) tablet 300 mg, 300 mg, Oral, BID, Johny Lombardo MD, 300 mg at 10/26/24 0919    NIFEdipine (PROCARDIA XL) 24 hr tablet 90 mg, 90 mg, Oral, Daily, Joselyn Reyes Bahamonde, MD, 90 mg at 10/26/24 0919    OLANZapine (ZyPREXA) tablet 10 mg, 10 mg, Oral, HS, Rose Miner MD, 10 mg at 10/25/24 2138    pantoprazole (PROTONIX) EC tablet 40 mg, 40 mg, Oral, Early Morning, Rose Miner MD, 40 mg at 10/26/24 0612    polyethylene glycol (MIRALAX) packet 17 g, 17 g, Oral, Daily PRN, Rose Miner MD    traZODone (DESYREL) tablet 50 mg, 50 mg, Oral, HS PRN, Rose Miner MD, 50 mg at 10/21/24 2109    Valbenazine Tosylate CAPS 40 mg, 40 mg, Oral, HS, Rose Miner MD, 40 mg at 10/25/24 2141    SKIN INTEGRITY:   no rashes, no erythema, no peripheral edema    PRIOR LEVEL OF FUNCTION:  He lives in a(n) single family home  Tommie Taylor is  and lives with their family (spouse and 15 year old niece).  Self Care: Independent, Indoor Mobility: Independent, Stairs (in/outdoor): Independent, and Cognition:  Independent  Prior to patient's admission, patient was fully Independent with ADLs and received assistance with IADLs. Spouse provides transportation. He was Independent without use of AD for mobility.    FALLS IN THE LAST 6 MONTHS: 1 to 4    HOME ENVIRONMENT:  The living area: can live on one level with 1st floor bathroom and couch.  There are 2 steps vs ramp to enter the home, with full flight of stairs to 2nd floor of home.    The patient will not have 24 hour supervision/physical assistance available upon discharge.  Wife is primary support, but is limited in how much physical A she can provide. There is a 15 Y.O. niece who is also present, however, no one else can regularly assist.     PREVIOUS DME:  Equipment in home (previous DME): Shower Chair and Grab Bars    FUNCTIONAL STATUS:  Physical Therapy Occupational Therapy Speech Therapy   10/25/24 0836    PT Last Visit   PT Visit Date 10/25/24   Note Type   Note Type Treatment   Pain Assessment   Pain Assessment Tool 0-10   Pain Score No Pain   Restrictions/Precautions   Weight Bearing Precautions Per Order No   Other Precautions Cognitive;Chair Alarm;Bed Alarm;Fall Risk;Pain   General   Chart Reviewed Yes   Response to Previous Treatment Patient with no complaints from previous session.   Family/Caregiver Present No   Cognition   Overall Cognitive Status Impaired   Arousal/Participation Alert;Responsive;Cooperative   Attention Attends with cues to redirect   Orientation Level Oriented X4   Memory Decreased short term memory;Decreased recall of recent events;Decreased recall of precautions   Following Commands Follows one step commands with increased time or repetition   Comments pt was pleasant and cooperative throughout PT intervention   Subjective   Subjective pt was agreeable to participate in PT intervention and stated no pain pre/post tx session   Bed Mobility   Supine to Sit 4  Minimal assistance   Additional items Assist x 1;HOB  elevated;Bedrails;Increased time required;Verbal cues;Other  (HHA, pt was able to advance bilateral LE's off EOB and only require HHA to pull up into a seated EOB position)   Sit to Supine Unable to assess   Additional Comments pt seated OOB in the recliner post tx session with call bell, chair alarm activated, set up for breakfast and all pt needs met   Transfers   Sit to Stand 2  Maximal assistance   Additional items Assist x 1;Increased time required;Verbal cues   Stand to Sit 2  Maximal assistance   Additional items Assist x 1;Armrests;Increased time required;Verbal cues   Stand pivot 2  Maximal assistance   Additional items Assist x 1;Increased time required;Verbal cues   Additional Comments pt continues to require max Ax1 for all functional transfers towards his L side while utilizing SBQC in L UE   Ambulation/Elevation   Gait pattern Not appropriate   Balance   Static Sitting Fair -   Dynamic Sitting Poor +  (in the recliner)   Static Standing Poor -   Dynamic Standing Poor -   Ambulatory Zero   Endurance Deficit   Endurance Deficit Yes   Endurance Deficit Description limited functional mobility, functional transfers and activity tolerance   Activity Tolerance   Activity Tolerance Patient limited by fatigue   Nurse Made Aware Spoke to RN Makayla   Exercises   Hip Flexion Sitting;15 reps;AAROM;PROM;Right   Hip Abduction Sitting;15 reps;AROM;Bilateral   Hip Adduction Sitting;15 reps;AROM;Bilateral  (pillow squeezes)   Knee AROM Long Arc Quad Sitting;10 reps;AROM;Right   Ankle Pumps Sitting;20 reps;AROM;Bilateral   Assessment   Prognosis Good   Problem List Decreased strength;Decreased range of motion;Decreased endurance;Impaired balance;Decreased mobility;Decreased coordination;Decreased safety awareness;Obesity   Assessment pt began tx session lying supine in the bed as pt was motivated and agreeable to participate in PT intervention. PT to focus on bed mobility, transfer training, TE activities and increasing  pt activity tolerance. In comparison to previous tx session pt demonstrated progress with bed mobility as pt was able to advance bilateral LE's off EOB and only required min Ax1 HHA in order to pull up to a seated EOB position. pt was able to sit EOB 5 minutes w/o LOB while being educated on functional transfers with SBQC towards his L side. pt continues to remain consistant for requiring max ax1 for all functional transfers in todays tx session. While seated in the recliner pt was able to participate in TE activities of RLE with AROM except for seated marches which required AAROM/PROM due to fatigue and weakness. Post tx pt continues to be limited and not performing at his baseline level. pt remains at an increased risk for falls and injuries and would benefit from continued skilled PT intervention. Continue to recommend DC w/ level 1 maximal rehab resource intensity when medically cleared    10/26/24 1100    PT Last Visit   PT Visit Date 10/26/24   Note Type   Note Type Treatment   Pain Assessment   Pain Assessment Tool 0-10   Pain Score No Pain   Restrictions/Precautions   Weight Bearing Precautions Per Order No   Other Precautions Cognitive;Chair Alarm;Bed Alarm;Fall Risk;Pain   General   Chart Reviewed Yes   Response to Previous Treatment Patient with no complaints from previous session.   Family/Caregiver Present No   Cognition   Overall Cognitive Status Impaired   Arousal/Participation Alert;Responsive;Cooperative   Attention Attends with cues to redirect   Orientation Level Oriented X4   Memory Decreased recall of recent events;Decreased recall of precautions;Decreased short term memory   Following Commands Follows one step commands with increased time or repetition   Comments pt continues to be cooperative throughout PT intervention   Subjective   Subjective pt stated no pain in [facility] tx session and was agreeable to participate in PT intervention   Bed Mobility   Supine to Sit 3  Moderate assistance    Additional items Assist x 1;HOB elevated;Bedrails;Increased time required;Verbal cues;Other  (trunk management)   Sit to Supine Unable to assess   Additional Comments pt seated OOB in the recliner post tx session with call bell, chair alarm activated, legs elevated and all pt needs met   Transfers   Sit to Stand 2  Maximal assistance   Additional items Assist x 1;Increased time required;Verbal cues   Stand to Sit 2  Maximal assistance   Additional items Assist x 1;Armrests;Increased time required;Verbal cues   Stand pivot 2  Maximal assistance   Additional items Assist x 1;Armrests;Increased time required;Verbal cues   Additional Comments pt continues to require max ax1 for all functional transfers with SBQC towards pt L side   Ambulation/Elevation   Gait pattern Not appropriate;Not tested   Balance   Static Sitting Fair -   Dynamic Sitting Poor +   Static Standing Poor -   Dynamic Standing Poor -   Ambulatory Zero   Endurance Deficit   Endurance Deficit Yes   Endurance Deficit Description limited bed mobility, functional mobility, standing tolerance and ambulation   Activity Tolerance   Activity Tolerance Patient limited by fatigue;Other (Comment)  (generalized weakness)   Nurse Made Aware Spoke to RN   Exercises   Hip Flexion Sitting;AAROM;PROM;Right;15 reps   Hip Abduction Sitting;15 reps;AROM;Bilateral   Hip Adduction Sitting;15 reps;AROM;Bilateral  (pillow squeezes)   Knee AROM Short Arc Quad Supine;15 reps;PROM;Right   Knee AROM Long Arc Quad Sitting;10 reps;AROM;Right   Ankle Pumps Sitting;20 reps;AROM;Bilateral   Assessment   Prognosis Good   Problem List Decreased strength;Decreased range of motion;Impaired balance;Decreased endurance;Decreased mobility;Decreased coordination;Decreased safety awareness;Obesity   Assessment pt began tx session lying supine in the bed as pt was agreeable to participate in PT intervention. PT to focus on bed mobility, transfer training, TE activities and increasing pt  "activity tolerance/endurance. In comparison to previous tx session pt required an increase in level of assistance required for completing a supine<>sit EOB transfer as pt required mod ax1 w/ trunk management. pt was able to sit EOB w/o LOB while being educated on functional transfers w/ SBQC use in LUE. pt continues to require max ax1 for all functional transfers towards his L side with SBQC. pt required constant Vc's for lateral stepping and tactile cues for RLE. While seated in the recliner pt was able to tolerate and participate in TE activities >8 minutes. Post tx pt in the recliner with legs elevated, chair alarm activated and all pt needs met. pt continues to be at an increased risk for falls and injuries with all OOB activities at thius time and would benefit from continued skilled PT intervention oin order to increase pt safety w/ all OOB activities. Continue to recommend Dc w/ level 1 maximal rehab resource intensity when medically cleared       10/24/24 0831    OT Last Visit   OT Visit Date 10/24/24   Note Type   Note Type Treatment for insurance authorization   Pain Assessment   Pain Assessment Tool 0-10   Pain Score No Pain   Restrictions/Precautions   Weight Bearing Precautions Per Order No   Other Precautions Cognitive;Chair Alarm;Bed Alarm;Multiple lines;Fall Risk;Pain   Lifestyle   Autonomy Pt lives w/ family in a 2SH. Independent with ADLs, most IADLs and functional mobility w/o use of AD. (+) falls and (-) driving   Reciprocal Relationships Supportive family   Service to Others Retired working in [employer]   Intrinsic Gratification \"Busy working around the house\"   ADL   Eating Assistance 5  Supervision/Setup   Eating Deficit Setup;Increased time to complete   Eating Comments A with cutting and opening containers   Grooming Assistance 5  Supervision/Setup   Grooming Deficit Increased time to complete;Supervision/safety;Verbal cueing;Wash/dry hands;Wash/dry face   Grooming Comments use of LUE only " "  UB Bathing Comments declining UB bathing at this time   LB Bathing Assistance 2  Maximal Assistance   LB Bathing Deficit Increased time to complete;Supervision/safety;Verbal cueing;Buttocks;Right lower leg including foot;Left lower leg including foot;Left upper leg;Right upper leg   LB Bathing Comments Able to wash B thighs, A with B feet + buttocks + thoroughness with periarea   UB Dressing Assistance 3  Moderate Assistance   UB Dressing Deficit Increased time to complete;Verbal cueing;Supervision/safety;Thread RUE;Thread LUE;Pull around back   UB Dressing Comments donning robe   LB Dressing Assistance 3  Moderate Assistance   LB Dressing Deficit Increased time to complete;Verbal cueing;Supervision/safety;Don/doff R sock;Don/doff L sock   LB Dressing Comments Able to doff A socks with increased time - poor recall of one handed techniques from previous session. A with donning   Toileting Comments declining need to void this session   Bed Mobility   Additional Comments OOB and in chair at start and end of session   Transfers   Sit to Stand 2  Maximal assistance   Additional items Assist x 1;Increased time required;Verbal cues   Stand to Sit 2  Maximal assistance   Additional items Assist x 1;Increased time required;Verbal cues   Additional Comments HHA + knee block for sit >< stand - washing buttocks in standing   Subjective   Subjective \"I have the hiccups again\"   Cognition   Overall Cognitive Status Impaired   Arousal/Participation Alert;Cooperative   Attention Attends with cues to redirect   Orientation Level Oriented to person;Oriented to place;Disoriented to time;Disoriented to situation  (oriented grossly to situation - poor recall of details)   Memory Decreased short term memory;Decreased recall of recent events;Decreased recall of precautions   Following Commands Follows one step commands with increased time or repetition   Comments Pleasant and cooperative, repetition of VC throughout, poor insight into " deficits   Activity Tolerance   Activity Tolerance Patient tolerated treatment well   Medical Staff Made Aware DESHAWN Benavides   Assessment   Assessment Pt seen on this date for skilled OT treatment session. At start of session pt sitting in recliner chair. Pt lethargic upon arrival - noted improved alertness with participation in ADL tasks. Requiring cuing for initiation of tasks, consistent performance with ADL tasks from previous session. Limited involvement  of LUE. Poor recall of exercises from previous session. Continues to be limited by awareness of LUE and overall insight. Pt would continue to benefit from skilled OT treatment sessions in order to address remaining deficits    10/25/2024, per SLP    Subjective:  Pt seen for dysphagia tx follow up lunch. Pt more awake and alert. Pt on dysphagia 3 diet w/ NTL.      Objective:  Pt did not like lunch entree- stuffed shells and broccoli. Pt offered PB&J sand and given thin water by cup and straw. Pt took bites, exhibited good mastication/manipulation. Pocketing noted on R, needed reminders to clear. Pt used lingual sweeps to clear pocketing. Pt took sips of thin liquids w/ good oral control, including with food in his mouth. Swallows appeared timely, with no overt s/s aspiration.      Assessment:  Pt w/ improved swallowing skills, cont w/ pocketing on R, but able to clear w/ min cues.   No overt s/s aspiration with thin liquids by cup and straw.      Plan/Recommendations:  Rec advance to regular diet w/ thin liquids  Cue pt for pocketing  as needed- pt stated he knows he needs to make sure his mouth clear at end of meal.   Meds as tolerated   Will cont to follow     CARE SCORES:  Self Care:  Eatin: Supervision or touching  assistance  Oral hygiene: 04: Supervision or touching  assistance  Toilet hygiene: 09: Not applicable  Shower/bathing self: 02: Substantial/maximal assistance  Upper body dressin: Partial/moderate assistance  Lower body dressin:  Partial/moderate assistance  Putting on/taking off footwear: 03: Partial/moderate assistance  Transfers:  Roll left and right: 09: Not applicable  Sit to lyin: Not applicable  Lying to sitting on side of bed: 03: Partial/moderate assistance  Sit to stand: 02: Substantial/maximal assistance  Chair/bed to chair transfer: 02: Substantial/maximal assistance  Toilet transfer: 09: Not applicable  Mobility:  Walk 10 ft: 88: Not attempted due to medical conditions or safety concerns  Walk 50 ft with two turns: 88: Not attempted due to medical conditions or safety concerns  Walk 150ft: 88: Not attempted due to medical conditions or safety concerns    CURRENT GAP IN FUNCTION  Prior to Admission: Functional Status: Patient was independent with mobility/ambulation, transfers, ADL's, IADL's.    Expected functional outcomes: It is expected that with skilled acute rehabilitation services the patient will progress to Supervision for self care and Supervision for mobility     Estimated length of stay: 2 - 3 weeks    Anticipated Post-Discharge Disposition/Treatment  Disposition: Return to previous home/apartment.  Outpatient Services: Physical Therapy (PT), Occupational Therapy (OT), and Speech Therapy    BARRIERS TO DISCHARGE  Weakness, Pain, Diminished cognition/Mentation change, Balance Difficulty, Fatigue, Home Accessibility, Caregiver Accessibility, Financial Resources, Equipment Needs, and Resource Availability    INTERVENTIONS FOR DISCHARGE  Adaptive equipment, Patient/Family/Caregiver Education, Community Resources, Support Group, Financial Assistance, Arrange DME needs, Medication Changes as per MD recommendations, Therapy exercises, Center of balance support , and Energy conservation education     REQUIRED THERAPY:  Patient will require PT, OT and ST 60 minutes each per day, five days per week to achieve rehab goals.     REQUIRED FUNCTIONAL AND MEDICAL MANAGEMENT FOR INPATIENT REHABILITATION:  Skin:  There are no  pressure sores currently, Pain Management: Overall pain is well controlled, Deep Vein Thrombosis (DVT) Prophylaxis:  heparin and ASA and Plavix, Diabetes Management: continue sliding scale insulin, patient to do finger sticks as ordered, SLIM to continue to manage diabetes, and further IM management of additional medical conditions while on ARC, he needs PT/OT/ST intervention, patient/family education and training, possible Neuropsych and Neurology consults with any other needed consults prn, nursing medication review and management of bowel/bladder function. Patient/family can participate in unit based stroke education while on ARC.    RECOMMENDED LEVEL OF CARE:   Patient is a 59 year old male that presented to [facility] on 10/14/2024 via EMS as prehospital stroke alert d/t fall following episode of dizziness and sudden onset right sided weakness. NIHSS of 8, and was administered TNK after BP controlled s/p IV Labetalol and Cardene gtt. CT brain was negative. CTA H/N was unremarkable. Neurology was consulted, and patient was admitted to stroke pathway. Following TNK, patient developed worsening dysarthria and headache. Repeat CTH was unremarkable. Statin dose was increased. Patient underwent SLP evaluation, which showed mild-moderate oropharyngeal dysphagia, and he was recommended for Dysphagia 1 pureed diet with Nectar thick liquids, aspiration precautions. 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 ECHO showed EF 70%, moderate concentric hypertrophy, grade 2 diastolic dysfunction, mild AVR. Per Neurology, plan for DAPT (ASA and Plavix) for 21 days, then ASA monotherapy. Recommended for outpatient Ziopatch/Holter monitor at D/C. CVA likely d/t uncontrolled HTN, working towards goal normotension. Nephrology was consulted re: ROSINA, and patient was initiated on PO diuretics. Renal US and CT C/A/P were both negative for acute findings. Patient's creatinine levels remain increased, however  appears to be plateauing per Nephrology. On 10/22, patient with confusion in AM. Repeat CTH was stable, and EEG was negative for seizure activity. Psychiatry was consulted re: medications, given history of ADHD, bipolar disorder and tardive dyskinesis, with no changes made at this time. On 10/23, Torsemide was held, and patient was administered IV fluids d/t increase in creat level. Patient again with noted lethargy and unable to maintain conversation. Repeat MRI brain was stable. Per Neurology, concern confusion/lethargy likely r/t untreated sleep apnea and delirium in setting of cog impairment at baseline, with no further recommendations/workup at this time. Patient was initiated on CPAP at .  As of 10/25, patient much more awake, alert and interactive.  Still with dysarthria.  Prior to patient's admission, patient was fully Independent with ADLs and received assistance with IADLs. Spouse provides transportation. He was Independent without use of AD for mobility. Currently, patient is currently requiring mod x 1 assistance for bed mobility and max x 1 assistance for his transfers.  Gait not appropriate.  He is requiring supervision to moderate assistance to complete his ADLs.  Close medical management and PM&R management is recommended at this time while patient is on the ARC. Inpatient acute rehab is recommended for patient to maximize overall strength and mobility upon discharge to home with support of family.

## 2024-10-26 NOTE — ASSESSMENT & PLAN NOTE
Lab Results   Component Value Date    HGBA1C 8.6 (H) 10/16/2024       Recent Labs     10/27/24  1126 10/27/24  1623 10/27/24  2107 10/28/24  0626   POCGLU 146* 108 107 90     Most recent hemoglobin A1c of 8.6 and technically uncontrolled although blood sugars in the hospital have been well-controlled  Currently on semaglutide as an outpatient but was on hold due to ROSINA in the hospital  Continue sliding scale and 4 times daily Accu-Cheks and as well as a diabetic diet

## 2024-10-26 NOTE — ASSESSMENT & PLAN NOTE
Etiology suggested to be secondary to ATN versus TMA in the setting of uncontrolled blood pressure and complicated by contrast associated nephropathy   Baseline creatinine around 2.9  Current creatinine 4.21--has been fluctuating in the mid 4 range without improvement over the course of several days, now with improvement to 4.04.  UA with microhematuria, proteinuria  US kidney and bladder --bladder wall thickening but no hydronephrosis  Bladder scan with no retention  No indication of dialysis at this time per nephrology  Avoid nephrotoxic agents  Monitor I&Os  Nephrology following, input is appreciated.  Demadex being held and IV fluids were given transiently  Will need continued follow-up BMP as an outpatient/at the rehabilitation facility.  Outpatient follow-up with nephrologist.

## 2024-10-26 NOTE — ASSESSMENT & PLAN NOTE
Lab Results   Component Value Date    EGFR 17 10/28/2024    EGFR 15 10/26/2024    EGFR 14 10/25/2024    CREATININE 3.55 (H) 10/28/2024    CREATININE 4.04 (H) 10/26/2024    CREATININE 4.21 (H) 10/25/2024     Recent creatinine of 3.55 10/28 Prior baseline around 2.9-3.0  Etiology felt to be related potentially to ATN in the setting of uncontrolled hypertension and complicated by contrast associated nephropathy  Nephrology was consulted and followed and a UA showed microhematuria and proteinuria.  An ultrasound of the kidney/bladder showed wall thickening but no hydronephrosis  Bladder scans negative for retention  Monitor BMP biweekly or sooner if clinically indicated  Demadex has been on hold and had periodically required IV fluids  Follow with nephrology as an outpatient or sooner on the ARC if any acute changes

## 2024-10-27 LAB
GLUCOSE SERPL-MCNC: 107 MG/DL (ref 65–140)
GLUCOSE SERPL-MCNC: 108 MG/DL (ref 65–140)
GLUCOSE SERPL-MCNC: 146 MG/DL (ref 65–140)
GLUCOSE SERPL-MCNC: 99 MG/DL (ref 65–140)

## 2024-10-27 PROCEDURE — 82948 REAGENT STRIP/BLOOD GLUCOSE: CPT

## 2024-10-27 PROCEDURE — 97110 THERAPEUTIC EXERCISES: CPT

## 2024-10-27 PROCEDURE — 97530 THERAPEUTIC ACTIVITIES: CPT

## 2024-10-27 PROCEDURE — 97163 PT EVAL HIGH COMPLEX 45 MIN: CPT

## 2024-10-27 PROCEDURE — 97116 GAIT TRAINING THERAPY: CPT

## 2024-10-27 PROCEDURE — 99223 1ST HOSP IP/OBS HIGH 75: CPT | Performed by: STUDENT IN AN ORGANIZED HEALTH CARE EDUCATION/TRAINING PROGRAM

## 2024-10-27 PROCEDURE — 92523 SPEECH SOUND LANG COMPREHEN: CPT

## 2024-10-27 PROCEDURE — 94660 CPAP INITIATION&MGMT: CPT

## 2024-10-27 PROCEDURE — 97167 OT EVAL HIGH COMPLEX 60 MIN: CPT

## 2024-10-27 RX ADMIN — HEPARIN SODIUM 7500 UNITS: 5000 INJECTION INTRAVENOUS; SUBCUTANEOUS at 22:39

## 2024-10-27 RX ADMIN — HEPARIN SODIUM 7500 UNITS: 5000 INJECTION INTRAVENOUS; SUBCUTANEOUS at 03:00

## 2024-10-27 RX ADMIN — ASPIRIN 81 MG: 81 TABLET, COATED ORAL at 08:24

## 2024-10-27 RX ADMIN — CLOPIDOGREL BISULFATE 75 MG: 75 TABLET ORAL at 08:24

## 2024-10-27 RX ADMIN — VALBENAZINE 40 MG: 40 CAPSULE ORAL at 22:39

## 2024-10-27 RX ADMIN — OLANZAPINE 10 MG: 2.5 TABLET, FILM COATED ORAL at 22:39

## 2024-10-27 RX ADMIN — ATORVASTATIN CALCIUM 40 MG: 40 TABLET, FILM COATED ORAL at 17:25

## 2024-10-27 RX ADMIN — BUPROPION HYDROCHLORIDE 100 MG: 100 TABLET, FILM COATED ORAL at 17:25

## 2024-10-27 RX ADMIN — HYDRALAZINE HYDROCHLORIDE 25 MG: 25 TABLET ORAL at 05:17

## 2024-10-27 RX ADMIN — PANTOPRAZOLE SODIUM 40 MG: 40 TABLET, DELAYED RELEASE ORAL at 05:17

## 2024-10-27 RX ADMIN — HYDRALAZINE HYDROCHLORIDE 25 MG: 25 TABLET ORAL at 14:34

## 2024-10-27 RX ADMIN — HEPARIN SODIUM 7500 UNITS: 5000 INJECTION INTRAVENOUS; SUBCUTANEOUS at 14:34

## 2024-10-27 RX ADMIN — LABETALOL HYDROCHLORIDE 300 MG: 100 TABLET, FILM COATED ORAL at 22:39

## 2024-10-27 RX ADMIN — LABETALOL HYDROCHLORIDE 300 MG: 100 TABLET, FILM COATED ORAL at 08:25

## 2024-10-27 RX ADMIN — NIFEDIPINE 90 MG: 30 TABLET, FILM COATED, EXTENDED RELEASE ORAL at 08:24

## 2024-10-27 RX ADMIN — HYDRALAZINE HYDROCHLORIDE 25 MG: 25 TABLET ORAL at 22:39

## 2024-10-27 RX ADMIN — BUPROPION HYDROCHLORIDE 100 MG: 100 TABLET, FILM COATED ORAL at 08:24

## 2024-10-27 NOTE — ASSESSMENT & PLAN NOTE
Lab Results   Component Value Date    HGBA1C 8.6 (H) 10/16/2024       Recent Labs     10/27/24  1126 10/27/24  1623 10/27/24  2107 10/28/24  0626   POCGLU 146* 108 107 90       Blood Sugar Average: Last 72 hrs:  (P) [id number]  Continue on insulin  most recent A1c 8.6% as of October 2024  Advised of tight glycemic control

## 2024-10-27 NOTE — PROGRESS NOTES
SLP TAA     10/27/24 1000   Patient Data   Rehab Impairment Impairment of mobility, safety, Activities of Daily Living (ADLs), and cognitive/communication skills due to Stroke:  01.2  Right Body Involvement (Left Brain)   Etiologic Diagnosis Left Corona Radiata Infarct   Date of Onset 10/14/24   Restrictions/Precautions   Precautions Bed/chair alarms;Cognitive;Fall Risk;Supervision on toilet/commode   Pain Assessment   Pain Assessment Tool 0-10   Pain Score No Pain   Comprehension   Assist Devices Glasses  (Pt reports he uses glassess but has baseline visual deficits and reports he is in the process of getting stronger glasses.)   Auditory Basic;Complex   Visual Basic   Findings Please see SLP tx note for further details.   QI: Comprehension 3. Usually Understands: Understands most conversations, but misses some part/intent of message. Requires cues at times to understand.   Comprehension (FIM) 4 - Understands basic info/conversation 75-90% of time   Expression   Verbal Basic   Non-Verbal Basic;Complex   Intelligibility Phrase   Findings Please see SLP tx note for further details.   QI: Expression 3. Exhibits some difficulty with expressing needs and ideas (e.g., some words or finishing thoughts) or speech is not clear   Expression (FIM) 3 - Expresses basic info/needs 50-74% of time   Social Interaction   Cooperation with staff   Participation Individual   Behaviors observed Restless   Findings Please see SLP tx note for further details.   Social Interaction (FIM) 4 - Interacts 75-89% of time   Problem Solving   Complex Manages discharge planning   Routine Manages call bell;Manges precautions   Findings Please see SLP tx note for further details.   Problem solving (FIM) 3 - Solves basic problmes 50-74% of time   Memory   Remember Routine Yes   Initiates Tasks No   Short-Term Impaired   Long Term Impaired   Findings Please see SLP tx note for further details.   Memory (FIM) 3 - Recognizes, recalls/performs 50-74%    Discharge Information   Vocational Plan Retired/not working   Patient's Discharge Plan Home with family support   Patient's Rehab Expectations 'I want to go home'   Barriers to Discharge Home Decreased Cognitive Function;Decreased Endurance;Decreased Strength;Pain;Safety Considerations   Impressions Pt is a 59 year old male that presented to [facility] on 10/14/2024 via EMS as prehospital stroke alert d/t fall following episode of dizziness and sudden onset right sided weakness. NIHSS of 8, and was administered TNK after BP controlled s/p IV Labetalol and Cardene gtt. CT brain was negative. Following TNK, patient developed worsening dysarthria and headache. Repeat CTH was unremarkable. Patient underwent SLP evaluation, which showed mild-moderate oropharyngeal dysphagia, and he was recommended for Dysphagia 1 pureed diet with Nectar thick liquids, aspiration precautions. Per Neurology, concern confusion/lethargy likely r/t untreated sleep apnea and delirium in setting of cog impairment at baseline, with no further recommendations/workup at this time. Patient was initiated on CPAP at .  As of 10/25, patient much more awake, alert and interactive.  Still with dysarthria. Orders were received when pt admitted into [facility] for cognitive evaluation with skilled ST. Chart review completed and formalized evaluation with partial formalized testing completed. Will attempt to complete formalized testing next session as well as motor speech evaluation due to previous dysarthria dx.     Pt is a good rehab candidate to achieve min assist upon anticipated discharge home w/ family support/supervision. Current barriers include: STM, LTM, sequencing, attention, problem solving, reasoning, word finding and visual deficits and decrease independence overall ADLs which will impact pt's safety awareness and functional mobility. Pt estimated length of stay ~2-3 weeks in [facility]. Pt will benefit from skilled SLP services to  maximize overall speech/language cognitive/swallow abilities at this time to decrease burden of care for family at time of discharge.    SLP Therapy Minutes   SLP Time In 1000   SLP Time Out 1035   SLP Total Time (minutes) 35   SLP Mode of treatment - Individual (minutes) 35   SLP Mode of treatment - Concurrent (minutes) 0   SLP Mode of treatment - Group (minutes) 0   SLP Mode of treatment - Co-treat (minutes) 0   SLP Mode of Treatment - Total time(minutes) 35 minutes   SLP Cumulative Minutes 35   Cumulative Minutes   Cumulative therapy minutes 125

## 2024-10-27 NOTE — PROGRESS NOTES
SLP COGNITIVE EVALUATION      10/27/24 1000   Pain Assessment   Pain Assessment Tool 0-10   Pain Score No Pain   Restrictions/Precautions   Precautions Bed/chair alarms;Cognitive;Fall Risk;Supervision on toilet/commode   Cognitive Linguisitic Assessments   Cognitive Linquistic Quick Test (CLQT) Pt partially completed the CLQT+ on initial evaluation. Due to fatigue and lethargy, pt refused to continue participating in formalized tx. Some sub tests were completed. Pt was noted to score at or above criterion cut for 1/6 sub tests attempted (confrontation naming) but noted to have difficulty with recalling personal facts, symbol cancellation, clock drawing, story retelling and generative naming. Will attempt completing the rest of formalized testing next session as pt was noted to do better in other therapies outside of room.                 Cognitive Domain: Score:  Severity Rating:   Attention   -- --    Memory -- --        Executive Functions -- --       Language 22 moderately impaired       Visuospatial Skills  -- --       Clock Drawing Screen    -- --    Overall Composite Severity Rating Score -- out of 4.0 --        Comprehension   Assist Devices Glasses  (Pt reports he uses glassess but has baseline visual deficits and reports he is in the process of getting stronger glasses.)   Auditory Basic;Complex   Visual Basic   Findings Please see SLP tx note for further details.   QI: Comprehension 3. Usually Understands: Understands most conversations, but misses some part/intent of message. Requires cues at times to understand.   Comprehension (FIM) 4 - Understands basic info/conversation 75-90% of time   Expression   Verbal Basic   Non-Verbal Basic;Complex   Intelligibility Phrase   Findings Please see SLP tx note for further details.   QI: Expression 3. Exhibits some difficulty with expressing needs and ideas (e.g., some words or finishing thoughts) or speech is not clear   Expression (FIM) 3 - Expresses basic  info/needs 50-74% of time   Social Interaction   Cooperation with staff   Participation Individual   Behaviors observed Restless   Findings Please see SLP tx note for further details.   Social Interaction (FIM) 4 - Interacts 75-89% of time   Problem Solving   Complex Manages discharge planning   Routine Manages call bell;Manges precautions   Findings Please see SLP tx note for further details.   Problem solving (FIM) 3 - Solves basic problmes 50-74% of time   Memory   Remember Routine Yes   Initiates Tasks No   Short-Term Impaired   Long Term Impaired   Findings Please see SLP tx note for further details.   Memory (FIM) 3 - Recognizes, recalls/performs 50-74%   Speech/Language/Cognition Assessment   Treatment Assessment Pt was awake and alert upon arrival of SLP for skilled ST evaluation assessing current cognitive linguistic skills. Pt had just completed OT session and noted to be fatigued but was initially agreeable to ST evaluation in room. Pt demonstrated oriented x3 to month, year and reason for hospitalization. Pt was unable to recall current location but once given verbal FO2 options, pt was able to recall. During initial rapport building, pt was noted to recall that he lives with his wife Marie and also recalled he lives with his 15 y/o great grand niece and 2 grandchildren who are 6 and 8 y/o. 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 When attempted to express the name of the company, pt noted to have decrease intelligibility and SLP noting to ask twice for pt to repeat and pt began to get fatigue and frustrated with production. Pt was also able to report that his wife completed medication management and bill management and he completed cooking and cleaning PTH. When asked about changes in cognitive skills PTH vs now, pt noted insight into deficits with memory and agreed when SLP described feeling 'foggy brain.' SLP also discussed about possible dysarthria to which pt voiced he feels  he is understood ~15-20% of the time. SLP noted next session targeting assessing expressive skills.     SLP attempting to complete CLQT+ with pt however, due to increasing fatigue and lethargy and frustration with visual tasks during evaluation, pt noted to shut down and voice 'this is ridiculous, you need to leave now because I need to sleep.' SLP attempted to redirect pt to conversational and spontaneous expressive production to assess verbal expression instead but pt refused. During formalized testing, SLP further assessed pt's orientation in which pt was able to recall , where he was born and current address. However, pt was noted to have difficulty recalling current age stating '55.' During testing, SLP targeted visual scanning and pt noted to get increasingly frustrated. Testing discontinued at that time. SLP attempted to return later but pt noted to be asleep and did not awake to SLP voice. Recommend next session to finish completing formalized cognitive testing and attempt motor speech evaluation as well. Based on partial completion of cognitive testing, pt was noted to have decrease STM, LTM, sequencing, attention, problem solving, reasoning, word finding and visual deficits.  At this time, pt continues to benefit from ongoing skilled SLP services to maximize overall cognitive linguistic skills in attempts to decrease caregiver burden over time.   SLP Therapy Minutes   SLP Time In 1000   SLP Time Out 1035   SLP Total Time (minutes) 35   SLP Mode of treatment - Individual (minutes) 35   SLP Mode of treatment - Concurrent (minutes) 0   SLP Mode of treatment - Group (minutes) 0   SLP Mode of treatment - Co-treat (minutes) 0   SLP Mode of Treatment - Total time(minutes) 35 minutes   SLP Cumulative Minutes 35   Therapy Time missed   Time missed? No

## 2024-10-27 NOTE — QUICK NOTE
Please secure chat message renal for any questions or issues today.  Nephrology will formally see again tomorrow.  Order placed for BMP in AM.  Vitals reviewed and stable at this time.    Thank you.

## 2024-10-27 NOTE — TREATMENT PLAN
Individualized Plan of Care - [facility]  Tommie Taylor 59 y.o. male MRN: [id number]  Unit/Bed#: [facility] 267-01 Encounter: [id number]     PATIENT INFORMATION  ADMISSION DATE: 10/26/2024  5:13 PM ROBERT CATEGORY:Stroke:  01.2  Right Body Involvement (Left Brain)   ADMISSION DIAGNOSIS: CVA (cerebral vascular accident) (HCC) [I63.9]  EXPECTED LOS: 2 weeks     MEDICAL/FUNCTIONAL PROGNOSIS  Based on my assessment of the patient's medical conditions and current functional status, the prognosis for attaining medical and functional goals or the IRF stay is:  Fair    Medical Goals: Patient will be medically stable for discharge to [facility] upon completion of rehab program and Patient will be able to manage medical conditions and comorbid conditions with medications and follow up upon completion of rehab program    Cardiopulmonary function/Anemia: Ensure cardiopulmonary stability and optimize cardiopulmonary function not only at rest but with activity as patient's activity level significantly increases in acute rehab compared with prior to transfer in preparation for safe discharge from [facility].  Must closely and frequently monitor blood pressure, HR, anemia to ensure adequate cardiac output during ADLs and ambulation as patient is at increased risk for orthostatic hypotension/syncope and potential injury if not monitored for and managed adequately.     Blood pressure management:    Frequent monitoring of blood pressure with appropriate adjustments in blood pressure medication management to optimize blood pressure control and prevent/limit renal complications.   Monitoring impact of blood pressure and side-effects of blood pressure medications at rest and with activity.  Hypoxia prevention: Ensure appropriate level of oxygenation at rest and with activity to avoid symptomatic hypoxia, maximize functional performance, and decrease risk of atelectasis/pneumonia through close and frequent  monitoring, providing appropriate respiratory treatments (such as incentive spirometry), and when necessary provide/adjust respiratory medications.    DM: Patient will improve/maintain blood sugar control to ensure optimal healing and decrease risks of complications associated with DM through medication monitoring, adjustments as indicated, and optimal dietary intake and education.  Pain management:  Pain will improve with frequent evaluation of pain, careful adjustments in medications, frequent re-evaluation of patient's pain and medical/neurologic status to ensure optimal pain control, avoidance of potential serious and even life-threatening side-effects and drug interactions, as well as weaning pain medications as soon as possible to decrease risk of short and long-term use.     Stroke with hemiparesis:  Intensive skilled therapies with physical therapy and occupational therapy with close oversight and management by rehab specialized physician in acute rehabilitation setting to most expeditiously and effectively improve functional mobility, transfers, upper and lower body strengthening, conditioning, balance, and gait training with appropriate assistive device.  Patient will have optimal blood pressure management and blood sugar control.  Prevent/decrease risk of VTE, atelectasis, pneumonia, skin ulceration, fall, other injury (and when applicable shoulder subluxation, chronic shoulder pain, plantarflexion contracture)  Dysphagia: improve swallowing through SLP evaluation with intensive treatments, optimal nutrition, and fluid intake.  Adjust diet and swallowing precautions as indicated to decrease risk of aspiration pneumonia and respiratory distress.   Close oversight and management by rehab specialized physician.    Inpatient rehabilitation education/teaching:  To be provided to patient and typically family/caregiver (if able to be identified) by all skilled therapists, rehab nursing, case management, and  rehab specialized physician to ensure optimal recovery and decrease risks of complications in both acute rehabilitation setting as well as after discharge.   Anxiety (and/or Depression): Patient's mood and it's impact on therapy participation and functional recovery will improve during course with supportive counseling, relaxation/breathing techniques and if necessary medication management.  Requires frequent re-assessment and close management to ensure anxiety/depression management during acute rehab course with planning for appropriate outpatient management to ensure optimal mental health and functional recovery.    Acute kidney failure management and blood pressure management: Frequent measurements and evaluation of urinary intake, urinary output, and labs which include BUN/Cr and electrolytes with appropriate adjustments in medication and when necessary order additional testing.  Frequent monitoring of blood pressure with appropriate adjustments in blood pressure medication management to optimize blood pressure control and prevent/limit renal complications.    Chronic kidney failure management and blood pressure management: Frequent measurements and evaluation of urinary intake, urinary output, and labs which include BUN/Cr and electrolytes with appropriate adjustments in medication and when necessary order additional testing.  Frequent monitoring of blood pressure with appropriate adjustments in blood pressure medication management to optimize blood pressure control and prevent/limit renal complications.    Bladder dysfunction:  Appropriate bladder management with appropriate toileting program from rehab nursing and staff with oversight management by rehabilitation physicians which include appropriate monitoring and possible adjustments in medications to with goals to optimize bladder function and decrease risk of bladder retention, incontinence, and urinary tract infection.   Bowel dysfunction: Appropriate  bowel management with appropriate toileting program from rehab nursing and staff with oversight management by rehabilitation physicians which include adjustments in medications to optimize appropriate bowel function and prevent/decrease risk of constipation and bowel obstruction.    Obesity:  Close monitoring of nutrition status, nutrition specialist with adjustments in diet.   on appropriate short and long term nutrition and activity.  Obesity increases complexity of patient's overall condition and causes unique challenges during this part of patient's recovery process.  Supervise and if necessary make adjustments in rehab nursing care and skilled therapy care to ensure appropriate toileting, bed mobility, other ADLs, and ambulation to decrease risk of falls/injuries, VTE, skin breakdown/ulceration and optimize functional recovery.        ANTICIPATED DISCHARGE DISPOSITION AND SERVICES  COMMUNITY SETTING: Home - Assistance      ANTICIPATED FOLLOW-UP SERVICE:   Home Health Services: PT, OT, and SLP    DISCIPLINE SPECIFIC PLANS:  Required Disciplines & Services: Rehabillitation Nursing, Case Management, Dietay/Nutrition, Diabetes Education, and Psychology    REQUIRED THERAPY:  Therapy Hours per Day Days per Week   Physical Therapy 1-2 5-6   Occupational Therapy 1-2 5-6   Speech/Language Therapy 1-2 3-5   NOTE: Additional therapy time(s) or changes to allocation of therapies as appropriate to meet patient needs and to achieve functional goals.    Patient will participate in above therapy regimen consisting of PT, OT, and SLP due to the following medical procedure/condition:Stroke:  01.2  Right Body Involvement (Left Brain)    ANTICIPATED FUNCTIONAL OUTCOMES:  ADL:  Modified independent to set up for upper body ADLs and min assist to set up for lower body ADLs   Bladder/Bowel:  Modified independent to set up   Transfers:  Supervision to modified independent   Locomotion:  Modified independent for short household  distance ambulation   Cognitive:  Supervision to independent/set up     DISCHARGE PLANNING NEEDS  Equipment needs: Discharge needs to be reviewed with team      REHAB ANTICIPATED PARTICIPATION RESTRICTIONS:  Amubulate Short Distances Only, Assist with Bathing in Tub, Assist with Mobility, Decreaed Safety Awareness, Difficulty Expressing Basic Needs, Difficulty with Language Tasks, Inability to Drive, Rquires Assist with ADLS, Requires Assit with Homemaking, and Requires Assit with Steps    Alex George,   Physical Medicine and Rehabilitation  [facility]

## 2024-10-27 NOTE — ASSESSMENT & PLAN NOTE
Lab Results   Component Value Date    EGFR 17 10/28/2024    EGFR 15 10/26/2024    EGFR 14 10/25/2024    CREATININE 3.55 (H) 10/28/2024    CREATININE 4.04 (H) 10/26/2024    CREATININE 4.21 (H) 10/25/2024   Review of record shows patient has a baseline creatinine around 2.5 to 2.9 mg/dL  Creatinine on admission at [facility] was at 3.24 mg/dL on 10/14/2024  Creatinine on admission at [facility] at 4.04 mg/dL on 10/26/2024  Peak creatinine was thought to be 4.57 mg/dL on 10/21/2024  ROSINA thought to be secondary to IAN 10/14/2024 + ischemic injury secondary to severe hemodynamic perturbations plus some component of prerenal azotemia with subsequent ATN.  Creatinine today is at 3.55 milligrams per deciliter stable and improving towards baseline  Discussed with patient would like to wait a period of 3 months to see where new baseline creatinine will be  Workup revealed urine protein creatinine ratio 8.4 g prior to admission  Renal ultrasound no hydronephrosis  As an outpatient follows up with Dr. No for nephrology  Likely his underlying CKD secondary to age-related nephron loss plus hypertensive nephrosclerosis plus prior episode of acute kidney injury nondialysis requiring.  Stable for discharge from renal stable medically cleared will need outpatient follow-up upon discharge  While in rehab recommend labs Monday Wednesday Friday

## 2024-10-27 NOTE — PROGRESS NOTES
Physical Therapy Initial Evaluation       10/27/24 1300   Patient Data   Rehab Impairment Impairment of mobility, safety, Activities of Daily Living (ADLs), and cognitive/communication skills due to Stroke:  01.2  Right Body Involvement (Left Brain)   Etiologic Diagnosis Left Corona Radiata Infarct   Date of Onset 10/14/24   Support System   Name Marie   Relationship Spouse   Able to provide 24 hour supervision Yes   Able to provide physical help? Yes   Home Setup   Type of Home Multi Level   Method of Entry Stairs  (ramp vs 2 ASHOK without HR and a HW can fit in these 2 steps per patient.)   Number of Stairs 2  (without HR and per patient HW can fit in these 2 steps)   Number of Stairs in Home 14  (14 steps to the 2nd floor, 1st 7 steps no HR and next 7 steps with BHR. But acording to patient, he can stay on 1st floor)   First Floor Bathroom Shower   First Floor Bathroom Accessibility Grab bars by toilet;Grab bars in tub/shower   First Floor Setup Available Yes   Available Equipment Wheelchair;Roller Walker;Single Point Cane;Shower Chair   Baseline Information   Vocation Other  (retired)   Transportation Family/friends drive   Prior Level of Function   Self-Care 3. Independent - Patient completed the activities by him/herself, with or without an assistive device, with no assistance from a helper.   Indoor-Mobility (Ambulation) 3. Independent - Patient completed the activities by him/herself, with or without an assistive device, with no assistance from a helper.   Stairs 3. Independent - Patient completed the activities by him/herself, with or without an assistive device, with no assistance from a helper.   Functional Cognition 3. Independent - Patient completed the activities by him/herself, with or without an assistive device, with no assistance from a helper.   Prior Device Used Z. None of the above   Falls in the Last Year   Number of falls in the past 12 months 3   Type of Injury Associated with Fall No injury    Restrictions/Precautions   Precautions Bed/chair alarms;Cognitive;Fall Risk;Supervision on toilet/commode   Weight Bearing Restrictions No   ROM Restrictions No   Pain Assessment   Pain Assessment Tool 0-10   Pain Score No Pain   Transfer Bed/Chair/Wheelchair   Type of Assistance Needed Physical assistance   Physical Assistance Level 51%-75%   Comment Mod Assist using HW   Chair/Bed-to-Chair Transfer CARE Score 2   Roll Left and Right   Type of Assistance Needed Physical assistance   Physical Assistance Level 26%-50%   Roll Left and Right CARE Score 3   Sit to Lying   Type of Assistance Needed Physical assistance   Physical Assistance Level 26%-50%   Comment Needs assistance with RLE   Sit to Lying CARE Score 3   Lying to Sitting on Side of Bed   Type of Assistance Needed Physical assistance   Physical Assistance Level 26%-50%   Comment Needs assisatnce with RLE   Lying to Sitting on Side of Bed CARE Score 3   Sit to Stand   Type of Assistance Needed Physical assistance   Physical Assistance Level 51%-75%   Comment Mod Assist from wheelchair using HW   Sit to Stand CARE Score 2   Picking Up Object   Reason if not Attempted Safety concerns   Picking Up Object CARE Score 88   Car Transfer   Reason if not Attempted Safety concerns   Car Transfer CARE Score 88   Ambulation   Primary Mode of Locomotion Prior to Admission Walk   Walk 10 Feet   Type of Assistance Needed Physical assistance   Physical Assistance Level Total assistance   Comment He ambulated 7 feet and 18 feet x 2 with Mod Assist using HW and a wheelchair follow of a 2nd person.   Walk 10 Feet CARE Score 1   Walk 50 Feet with Two Turns   Reason if not Attempted Safety concerns   Walk 50 Feet with Two Turns CARE Score 88   Walk 150 Feet   Reason if not Attempted Safety concerns   Walk 150 Feet CARE Score 88   Walking 10 Feet on Uneven Surfaces   Reason if not Attempted Safety concerns   Walking 10 Feet on Uneven Surfaces CARE Score 88   Wheel 50 Feet with  Two Turns   Reason if not Attempted Activity not applicable   Wheel 50 Feet with Two Turns CARE Score 9   Wheel 150 Feet   Reason if not Attempted Activity not applicable   Wheel 150 Feet CARE Score 9   Curb or Single Stair   Style negotiated Single stair   Type of Assistance Needed Physical assistance   Physical Assistance Level Total assistance   Comment He was able to go up and down 1 steps x 2 with BHR and descending backwards with Min Assist of 2.   1 Step (Curb) CARE Score 1   4 Steps   Reason if not Attempted Safety concerns   4 Steps CARE Score 88   12 Steps   Reason if not Attempted Safety concerns   12 Steps CARE Score 88   Comprehension   Comprehension (FIM) 4 - Understands basic info/conversation 75-90% of time   Expression   Expression (FIM) 3 - Expresses basic info/needs 50-74% of time   RLE Assessment   RLE Assessment X   Strength RLE   RLE Overall Strength 3-/5   Tone RLE   RLE Tone WFL   LLE Assessment   LLE Assessment X   Strength LLE   LLE Overall Strength 4-/5   Tone LLE   LLE Tone WFL   Cognition   Overall Cognitive Status Impaired   Arousal/Participation Alert;Cooperative   Attention Attends with cues to redirect   Orientation Level Oriented X4   Memory Decreased recall of recent events;Decreased recall of precautions   Following Commands Follows one step commands with increased time or repetition   Therapeutic Exercise   Therapeutic Exercise/Activity Seated there x on BLEs with AROM for LAQ, hip flexion, hip abduction/aduction with knees extended, ankle DF/PF, hip adduction with ball squeezes for 3 x 10 reps each. Rest breaks given in between.   Discharge Information   Vocational Plan Retired/not working   Patient's Discharge Plan Home with family support   Patient's Rehab Expectations To get better and be able to go back home.   Barriers to Discharge Home Decreased Cognitive Function;Decreased Strength;Decreased Endurance;Safety Considerations   Impressions 60 y/o male pt who [facility] on 10/14/2024 via EMS as prehospital stroke alert d/t fall following episode of dizziness and sudden onset of right sided weakness. He was administered TNK after BP was controlled s/p IV Labetalol and Cardene gtt. CT of brain (-). CTA Head and Neck was unremarkable. Following TNK, patient developed worsening dysarthria and headache. Repeat CTH was unremarkable. Statin dose was increased. Patient underwent SLP evaluation, which showed mild-moderate oropharyngeal dysphagia, and he was recommended for Dysphagia 1 pureed diet with Nectar thick liquids, aspiration precautions. MRI of brain showed acute left corona radiata infarct. ECHO showed EF 70%, moderate concentric hypertrophy, grade 2 diastolic dysfunction, mild AVR. Per Neurology, plan for DAPT (ASA and Plavix) for 21 days, then ASA monotherapy. Recommended for outpatient Ziopatch/Holter monitor at D/C. CVA likely d/t uncontrolled HTN, working towards goal normotension. Nephrology was consulted for ROSINA and initiated on PO diuretics. Renal US and CT C/A/P (-). Patient's creatinine levels remain increased, however appears to be plateauing per Nephrology. On 10/22, patient with confusion in AM. Repeat CTH was stable, and EEG was negative for seizure activity. Psychiatry was consulted given history of ADHD, bipolar disorder and tardive dyskinesis and no changes made w/ his medications. On 10/23, Torsemide was held, and patient was administered IV fluids d/t increase in creatinine level. Patient again noted with lethargy and unable to maintain conversation. Repeat MRI brain was stable. Per Neurology, concern confusion/lethargy likely r/t untreated sleep apnea and delirium in setting of cog impairment at baseline, with no further recommendations/workup at this time. Patient was initiated on CPAP at . Today during Physical Therapy Initil Evaluation here at [facility], he presents w/ weakness on BLE's with RLE weaker than LLE, with impaired  stnding balance, w/ decreased endurance w/ amb, needed increased assistance from therapist during transfers & ambulation & he's unable to return home at this time. He's below his baseline & he needed Min Assist w/ bed mobility, Mod Assist w/ sit to stand transfers & SPT using HW & he was able to amb 7 feet & 18 feet x 2 w/ Mod Assist using HW & a CF of 2nd person.He will benefit from 3 weeks ELOS of skilled PT services to maximize level of function,to be able to return to his PLOF & assist with safe d/c planning.   PT Therapy Minutes   PT Time In 1300   PT Time Out 1400   PT Total Time (minutes) 60   PT Mode of treatment - Individual (minutes) 60   PT Mode of treatment - Concurrent (minutes) 0   PT Mode of treatment - Group (minutes) 0   PT Mode of treatment - Co-treat (minutes) 0   PT Mode of Treatment - Total time(minutes) 60 minutes   PT Cumulative Minutes 60   Cumulative Minutes   Cumulative therapy minutes 185

## 2024-10-27 NOTE — PLAN OF CARE
Problem: NEUROSENSORY - ADULT  Goal: Achieves stable or improved neurological status  Description: INTERVENTIONS  - Monitor and report changes in neurological status  - Monitor vital signs such as temperature, blood pressure, glucose, and any other labs ordered   - Initiate measures to prevent increased intracranial pressure  - Monitor for seizure activity and implement precautions if appropriate      Outcome: Progressing     Problem: SAFETY ADULT  Goal: Patient will remain free of falls  Description: INTERVENTIONS:  - Educate patient/family on patient safety including physical limitations  - Instruct patient to call for assistance with activity   - Consult OT/PT to assist with strengthening/mobility   - Keep Call bell within reach  - Keep bed low and locked with side rails adjusted as appropriate  - Keep care items and personal belongings within reach  - Initiate and maintain comfort rounds  - Make Fall Risk Sign visible to staff  - Offer Toileting   - Initiate/Maintain bed and chair alarm  - Apply yellow socks and bracelet for high fall risk patients  - Consider moving patient to room near nurses station  Outcome: Progressing     Problem: PAIN - ADULT  Goal: Verbalizes/displays adequate comfort level or baseline comfort level  Description: Interventions:  - Encourage patient to monitor pain and request assistance  - Assess pain using appropriate pain scale  - Administer analgesics based on type and severity of pain and evaluate response  - Implement non-pharmacological measures as appropriate and evaluate response  - Consider cultural and social influences on pain and pain management  - Notify physician/advanced practitioner if interventions unsuccessful or patient reports new pain  Outcome: Progressing

## 2024-10-27 NOTE — PROGRESS NOTES
Progress Note - Nephrology   Name: Tommie Taylor 59 y.o. male I MRN: [id number]  Unit/Bed#: [facility] 267-01 I Date of Admission: 10/26/2024   Date of Service: 10/28/2024 I Hospital Day: 2    59-year-old male with multiple comorbidities including CKD stage IIIb and hypertension presented initially to outside facility with right-sided weakness nephrology was managing for acute kidney injury subsequently transferred over to [facility] rehab.  Assessment & Plan  Acute kidney injury superimposed on stage 4 chronic kidney disease (HCC)  Lab Results   Component Value Date    EGFR 17 10/28/2024    EGFR 15 10/26/2024    EGFR 14 10/25/2024    CREATININE 3.55 (H) 10/28/2024    CREATININE 4.04 (H) 10/26/2024    CREATININE 4.21 (H) 10/25/2024   Review of record shows patient has a baseline creatinine around 2.5 to 2.9 mg/dL  Creatinine on admission at [facility] was at 3.24 mg/dL on 10/14/2024  Creatinine on admission at [facility] at 4.04 mg/dL on 10/26/2024  Peak creatinine was thought to be 4.57 mg/dL on 10/21/2024  ROSINA thought to be secondary to IAN 10/14/2024 + ischemic injury secondary to severe hemodynamic perturbations plus some component of prerenal azotemia with subsequent ATN.  Creatinine today is at 3.55 milligrams per deciliter stable and improving towards baseline  Discussed with patient would like to wait a period of 3 months to see where new baseline creatinine will be  Workup revealed urine protein creatinine ratio 8.4 g prior to admission  Renal ultrasound no hydronephrosis  As an outpatient follows up with Dr. No for nephrology  Likely his underlying CKD secondary to age-related nephron loss plus hypertensive nephrosclerosis plus prior episode of acute kidney injury nondialysis requiring.  Stable for discharge from renal stable medically cleared will need outpatient follow-up upon discharge  While in rehab recommend labs Monday Wednesday Friday  CVA (cerebral vascular accident) (HCC)  Follow-up with  neurology  Status post TNK administration  Bipolar I disorder, severe, current or most recent episode depressed, with psychotic features ([facility])  Follow-up with psych  Currently on Zyprexa and Wellbutrin  Anemia, unspecified  Most recent hemoglobin stable at 10.4 g/dL  Maintain hemoglobin greater than 8 g/dL  Hyperlipidemia  Continue on atorvastatin  Hypertension  Initially with hypertensive emergency  Currently on labetalol 300 mg p.o. twice daily, nifedipine 90 mg p.o. daily, hydralazine 25 mg p.o. every 8  Torsemide on hold since 10/23 secondary to concerns for overdiuresis  Hold off on torsemide for now as clinically appears euvolemic  Maintain MAP over 65  DM2 (diabetes mellitus, type 2) ([facility])  Lab Results   Component Value Date    HGBA1C 8.6 (H) 10/16/2024       Recent Labs     10/27/24  1126 10/27/24  1623 10/27/24  2107 10/28/24  0626   POCGLU 146* 108 107 90       Blood Sugar Average: Last 72 hrs:  (P) [id number]  Continue on insulin  most recent A1c 8.6% as of October 2024  Advised of tight glycemic control  Impaired mobility and activities of daily living  Currently with wheelchair  Follow-up with [facility] rehab    I have reviewed the nephrology recommendations including blood work Monday Wednesday Friday while in [facility] rehab, with Dr. Woodall, and we are in agreement with renal plan including the information outlined above.     Subjective   Brief History of Admission -     Patient seen and examined in his room no overnight events blood pressure stable afebrile urine output 1 L plus no overt complaints at this time.    Objective :  Temp:  [96.9 °F (36.1 °C)-97.9 °F (36.6 °C)] 96.9 °F (36.1 °C)  HR:  [64-66] 65  BP: (151-164)/(79-91) 164/91  Resp:  [20] 20  SpO2:  [98 %-100 %] 100 %  O2 Device: CPAP    Current Weight: Weight - Scale: 120 kg (264 lb 12.8 oz)  First Weight: Weight - Scale: 120 kg (264 lb 12.8 oz)  I/O         10/25 0701  10/26 0700 10/26 0701  10/27 0700 10/27 0701  10/28 0700    P.O.  240      Total Intake(mL/kg)  240 (2)     Urine (mL/kg/hr)  300     Total Output  300     Net  -60                  Physical Exam  Vitals and nursing note reviewed.   Constitutional:       General: He is not in acute distress.     Appearance: Normal appearance. He is obese. He is not ill-appearing, toxic-appearing or diaphoretic.   HENT:      Head: Normocephalic and atraumatic.      Mouth/Throat:      Pharynx: No oropharyngeal exudate.   Eyes:      General: No scleral icterus.  Cardiovascular:      Rate and Rhythm: Normal rate.      Heart sounds: No murmur heard.  Pulmonary:      Effort: No respiratory distress.      Breath sounds: Normal breath sounds.   Abdominal:      Palpations: There is no mass.      Tenderness: There is no abdominal tenderness.   Musculoskeletal:         General: No swelling.      Cervical back: No rigidity.   Skin:     Coloration: Skin is not jaundiced.   Neurological:      General: No focal deficit present.      Mental Status: He is alert.   Psychiatric:         Mood and Affect: Mood normal.         Behavior: Behavior normal.       ROS:  Constitutional:  No chills, no fever.   HENT:  No sore throat  Respiratory:  No cough, no hemoptysis, no wheezing.    Cardiovascular:  no leg swelling.   Gastrointestinal:  No constipation, no diarrhea.   Genitourinary:  No dysuria and hematuria. No decreased urine output.  Musculoskeletal:  No back pain.   Neurological:  no dizziness, No headaches.   Psychiatric/Behavioral:  No agitation, no confusion.    Wounds: positive, negative  All other systems reviewed and are negative.        Medications:    Current Facility-Administered Medications:     acetaminophen (TYLENOL) tablet 650 mg, 650 mg, Oral, Q6H PRN, Alex George, DO, 650 mg at 10/26/24 1842    aspirin (ECOTRIN LOW STRENGTH) EC tablet 81 mg, 81 mg, Oral, Daily, Alex George, DO, 81 mg at 10/27/24 0824    atorvastatin (LIPITOR) tablet 40 mg, 40 mg, Oral, QPM, Alex George DO, 40 mg at 10/27/24 1728     buPROPion (WELLBUTRIN) tablet 100 mg, 100 mg, Oral, BID, Alex George DO, 100 mg at 10/27/24 1725    calcium carbonate (TUMS) chewable tablet 1,000 mg, 1,000 mg, Oral, BID PRN, Alex George DO    clopidogrel (PLAVIX) tablet 75 mg, 75 mg, Oral, Daily, Alex George DO, 75 mg at 10/27/24 0824    heparin (porcine) subcutaneous injection 7,500 Units, 7,500 Units, Subcutaneous, Q8H MIGEL, Alex George DO, 7,500 Units at 10/28/24 0548    hydrALAZINE (APRESOLINE) tablet 25 mg, 25 mg, Oral, Q8H MIGEL, Alex George DO, 25 mg at 10/28/24 0548    insulin lispro (HumALOG/ADMELOG) 100 units/mL subcutaneous injection 1-5 Units, 1-5 Units, Subcutaneous, HS, Alex George DO    insulin lispro (HumALOG/ADMELOG) 100 units/mL subcutaneous injection 1-6 Units, 1-6 Units, Subcutaneous, TID AC **AND** Fingerstick Glucose (POCT), , , 4x Daily AC and at bedtime, Alex George DO    labetalol (NORMODYNE) tablet 300 mg, 300 mg, Oral, BID, Alex George DO, 300 mg at 10/27/24 2239    NIFEdipine (PROCARDIA XL) 24 hr tablet 90 mg, 90 mg, Oral, Daily, Alex George DO, 90 mg at 10/27/24 0824    OLANZapine (ZyPREXA) tablet 10 mg, 10 mg, Oral, HS, Alex George DO, 10 mg at 10/27/24 2239    pantoprazole (PROTONIX) EC tablet 40 mg, 40 mg, Oral, Early Morning, Alex George DO, 40 mg at 10/28/24 0548    polyethylene glycol (MIRALAX) packet 17 g, 17 g, Oral, Daily PRN, Alex George DO    traZODone (DESYREL) tablet 50 mg, 50 mg, Oral, HS PRN, Alex George DO    Valbenazine Tosylate CAPS 40 mg, 40 mg, Oral, HS, Alex George, DO, 40 mg at 10/27/24 4255      Lab Results: I have reviewed the following results:  Results from last 7 days   Lab Units 10/28/24  0501 10/26/24  0842 10/25/24  0755 10/24/24  0526 10/23/24  0807 10/22/24  1020   POTASSIUM mmol/L 3.8 3.8 4.1 4.1 4.4 4.3   CHLORIDE mmol/L 108 111* 112* 111* 112* 111*   CO2 mmol/L 24 24 24 23 23 24   BUN mg/dL 32* 36* 41* 43* 43* 41*   CREATININE mg/dL 3.55* 4.04* 4.21* 4.31* 4.49*  "4.32*   CALCIUM mg/dL 8.4 8.5 8.7 8.6 9.1 8.7   PHOSPHORUS mg/dL 3.6  --   --   --   --   --        Administrative Statements     Portions of the record may have been created with voice recognition software. Occasional wrong word or \"sound a like\" substitutions may have occurred due to the inherent limitations of voice recognition software. Read the chart carefully and recognize, using context, where substitutions have occurred.If you have any questions, please contact the dictating provider.  "

## 2024-10-27 NOTE — PROGRESS NOTES
OT LTG       10/27/24 0830   Rehab Team Goals   ADL Team Goal Patient will require supervision with ADLs with least restrictive device upon completion of rehab program   Rehab Team Interventions   OT Interventions Self Care;Home Management;Therapeutic Exercise;Cognitive Retraining;Energy Conservation;Patient/Family Education   Eating Goal   Eating Goal 05. Setup or clean-up assistance - [place] SETS UP or CLEANS UP, patient completes activity. [place] assists only prior to or following the activity.   Status Ongoing;Target goal - two weeks;Target goal - three weeks   Grooming Goal   Oral Hygiene Goal 05. Setup or clean-up assistance - [place] SETS UP or CLEANS UP, patient completes activity. [place] assists only prior to or following the activity.   Status Ongoing;Target goal - two weeks;Target goal - three weeks   Intervention Assistive Device;Balance Work;Neuromuscular Education;Therapeutic Exercise;Tolerance Work   Tub/Shower Transfer Goal   Method Shower Stall   Assist Device Seat with Back   Status Ongoing;Target goal - two weeks;Target goal - three weeks   Interventions ADL Training;Assistive Device;Neuromuscular Education   Bathing Goal   Shower/bathe self Goal 03. Partial/moderate assistance - [place] does less than half the effort. [place] lifts or holds trunk or limbs and provides more than half the effort.   Status Ongoing;Target goal - two weeks;Target goal - three weeks   Intervention ADL Training;Assistive Device;Therapeutic Exercise;Neuromuscular Education   Upper Body Dressing Goal   Upper body dressing Goal 05. Setup or clean-up assistance - [place] SETS UP or CLEANS UP, patient completes activity. [place] assists only prior to or following the activity.   Status Ongoing;Target goal - two weeks;Target goal - three weeks   Intervention Assistive Device;Balance Work;Neuromuscular Education;Therapeutic Exercise;Tolerance Work   Lower Body Dressing Goal   Lower body dressing Goal 04. Supervision or touching  assistance- [place] provides VERBAL CUES or supervision throughout activity.   Putting on/taking off footwear Goal 03. Partial/moderate assistance - [place] does less than half the effort. [place] lifts or holds trunk or limbs and provides more than half the effort.   Status Ongoing;Target goal - two weeks;Target goal - three weeks   Intervention Assistive Device;Balance Work;Neuromuscular Education;Therapeutic Exercise;Tolerance Work   Toileting Transfer Goal   Toilet transfer Goal 04. Supervision or touching assistance- [place] provides VERBAL CUES or supervision throughout activity.   Status Ongoing;Target goal - two weeks;Target goal - three weeks   Intervention ADL Training;Balance Work;Assistive Device   Toileting Goal   Toileting hygiene Goal 04. Supervision or touching assistance- [place] provides VERBAL CUES or supervision throughout activity.   Status Ongoing;Target goal - two weeks;Target goal - three weeks   Intervention ADL Training;Assistive Device;Balance Work   Medication Management   Assist Level Supervision   Status Ongoing;Target goal - two weeks;Target goal - three weeks

## 2024-10-27 NOTE — PROGRESS NOTES
OT EVALUATION       10/27/24 4370   Patient Data   Rehab Impairment Impairment of mobility, safety, Activities of Daily Living (ADLs), and cognitive/communication skills due to Stroke:  01.2  Right Body Involvement (Left Brain)   Etiologic Diagnosis Left Corona Radiata Infarct   Date of Onset 10/14/24   Support System   Name Marie   Relationship Spouse   Able to provide 24 hour supervision Yes  (Pt reports wife and niece's father are home and available to help)   Able to provide physical help? Yes   Home Setup   Type of Home Multi Level   Method of Entry Stairs  (Ramp +1 step)   Number of Stairs 1   Number of Stairs in Home   (FF to 2nd floor)   First Floor Bathroom Shower   First Floor Bathroom Accessibility Grab bars in tub/shower;Grab bars by toilet   First Floor Setup Available Yes   Available Equipment Roller Walker;Wheelchair;Single Point Cane;Shower Chair   Baseline Information   Vocation Other  (retired)   Transportation Family/friends drive   Prior IADL Participation   Meal Preparation Full Participation   Laundry Full Participation   Home Cleaning Partial Participation  (shared with spouse and niece's father)   Prior Level of Function   Self-Care 3. Independent - Patient completed the activities by him/herself, with or without an assistive device, with no assistance from a helper.   Indoor-Mobility (Ambulation) 3. Independent - Patient completed the activities by him/herself, with or without an assistive device, with no assistance from a helper.   Stairs 3. Independent - Patient completed the activities by him/herself, with or without an assistive device, with no assistance from a helper.   Functional Cognition 3. Independent - Patient completed the activities by him/herself, with or without an assistive device, with no assistance from a helper.   Prior Device Used Z. None of the above   Falls in the Last Year   Number of falls in the past 12 months 3   Type of Injury Associated with Fall No injury    Patient Preference   Bhavana (Patient Preference) Tommie   Psychosocial   Psychosocial (WDL) WDL   Patient Behaviors/Mood Calm;Cooperative   Restrictions/Precautions   Precautions Bed/chair alarms;Cognitive;Fall Risk;Supervision on toilet/commode   Weight Bearing Restrictions No   ROM Restrictions No   Pain Assessment   Pain Assessment Tool 0-10   Pain Score No Pain   Eating Assessment   Type of Assistance Needed Set-up / clean-up   Physical Assistance Level No physical assistance   Comment setup for container management   Eating CARE Score 5   Oral Hygiene   Type of Assistance Needed Physical assistance   Physical Assistance Level 51%-75%   Comment To perform at PLOF in stance at sink. Pt completed oral care seated in WC at sink with incidental A for setup, pt demonstrated good use of compensatory techniques to complete.   Oral Hygiene CARE Score 2   Grooming   Able To Wash/Dry Face;Brush/Clean Teeth;Initiate Tasks   Limitation Noted In Strength;Safety   Findings to complete oral care and face washing seated in WC at sink   Tub/Shower Transfer   Reason Not Assessed Sponge Bath   Findings To be assessed when shower order present   Shower/Bathe Self   Type of Assistance Needed Physical assistance   Physical Assistance Level 51%-75%   Comment To complete at PLOF. Pt completed sponge bath seated at EOB requiring A to thoroughly wash feet. TA for rear in stance with Mod A x1 for standing balance.   Shower/Bathe Self CARE Score 2   Bathing   Assessed Bath Style Sponge Bath   Dressing/Undressing Clothing   Type of Assistance Needed Physical assistance   Physical Assistance Level 26%-50%   Comment To don t-shirt seated at EOB, A for threading RUE and down over trunk   Upper Body Dressing CARE Score 3   Type of Assistance Needed Physical assistance   Physical Assistance Level 51%-75%   Comment To complete at PLOF. Pt thread BLE seated at EOB requiring incidental A to complete threading. Mod A to complete acceleration  over hips in stance, Mod Ax1 for standing balance   Lower Body Dressing CARE Score 2   Putting On/Taking Off Footwear   Type of Assistance Needed Physical assistance   Physical Assistance Level 26%-50%   Comment Pt able to doff B socks via functional reach at EOB. TA to don B socks   Putting On/Taking Off Footwear CARE Score 3   Toileting Hygiene   Type of Assistance Needed Physical assistance   Physical Assistance Level 76% or more   Comment Mod A for CM in stance, TA for hygiene   Toileting Hygiene CARE Score 2   Toilet Transfer   Type of Assistance Needed Physical assistance   Physical Assistance Level Total assistance   Comment To perform at PLOF. Stand pivot transfer with Mod Ax1   Toilet Transfer CARE Score 1   Transfer Bed/Chair/Wheelchair   Type of Assistance Needed Physical assistance   Physical Assistance Level Total assistance   Comment To perform at PLOF. Stand pivot transfer with Mod Ax1   Chair/Bed-to-Chair Transfer CARE Score 1   Roll Left and Right   Type of Assistance Needed Physical assistance   Physical Assistance Level 51%-75%   Comment CGA to R, TA to L with use of bedrails   Roll Left and Right CARE Score 2   Sit to Lying   Type of Assistance Needed Physical assistance   Physical Assistance Level 26%-50%   Comment A for RLE and trunk   Sit to Lying CARE Score 3   Lying to Sitting on Side of Bed   Type of Assistance Needed Physical assistance   Physical Assistance Level 26%-50%   Comment A for RLE and trunk   Lying to Sitting on Side of Bed CARE Score 3   Sit to Stand   Type of Assistance Needed Physical assistance   Physical Assistance Level 51%-75%   Comment To perform at PLOF. Mod A x1   Sit to Stand CARE Score 2   Comprehension   QI: Comprehension 3. Usually Understands: Understands most conversations, but misses some part/intent of message. Requires cues at times to understand.   Expression   QI: Expression 3. Exhibits some difficulty with expressing needs and ideas (e.g., some words or  "finishing thoughts) or speech is not clear   Social Interaction   Behaviors observed Restless   RUE Assessment   RUE Assessment X   Strength - RUE   R Shoulder Flexion 1/5   R Shoulder ABduction 1/5   R Elbow Flexion 2-/5   R Wrist Extension 1/5   R Digit Flexion 1/5   R Digit Extension 1/5   LUE Assessment   LUE Assessment WFL   Sensation   Additional Comments Denies change in sensation in RUE, unable to formally assess   Cognition   Overall Cognitive Status Impaired   Arousal/Participation Responsive;Alert;Cooperative  (Pt alert and responsive throughout session, towards end of session pt appeared to become lethargic and poorly responsive. BP assessed and WNL, pt reported \"I feel restless again\". Pt transferred into recliner with BLE elevated and reported feeling better)   Attention Attends with cues to redirect   Orientation Level Oriented X4   Memory Decreased recall of recent events;Decreased recall of precautions   Following Commands Follows one step commands with increased time or repetition   Vision   Vision Comments Pt reporting \"My vision is bad, I used to wear glasses but I don't anymore. I mean I can see but my vision is bad\" Pt unable to provide further information   Discharge Information   Vocational Plan Retired/not working   Patient's Discharge Plan Home with support   Patient's Rehab Expectations \"I want to use my arm again\"   Barriers to Discharge Home Decreased Strength;Decreased Endurance;Safety Considerations;Decreased Cognitive Function;Unsafe Home Setup;Limited Family Support   Impressions Pt is a 59 y.o male admitted with chief complaint of dizziness and sudden onset right sided weakness.MRI brain showed acute left corona radiata infarct.  Pt has a past medical history significant for: ADHD, anxiety, bipolar disorder, CKD4, CVA, tardive dyskinesia, depression, DM, GERD, HLD, HTN, neuropathy, sleep apnea with CPAP. PRECAUTIONS: fall risk. Pt presents with the following impairments: balance, " strength, endurance, cognition, impaired tone, motor control. Pt would benefit from skilled occupational therapy services with focus on ADL retraining, strength, balance, functional transfers, NMR and cognition to ensure safe discharge and participation in functional activities to increase independence. Patient and caregiver education to be completed as appropriate. Anticipate  17 to 21 day ELOS to meet supervision level goals.   OT Therapy Minutes   OT Time In 0830   OT Time Out 1000   OT Total Time (minutes) 90   OT Mode of treatment - Individual (minutes) 90   OT Mode of treatment - Concurrent (minutes) 0   OT Mode of treatment - Group (minutes) 0   OT Mode of treatment - Co-treat (minutes) 0   OT Mode of Treatment - Total time(minutes) 90 minutes   OT Cumulative Minutes 90   Cumulative Minutes   Cumulative therapy minutes 90

## 2024-10-27 NOTE — ASSESSMENT & PLAN NOTE
Initially with hypertensive emergency  Currently on labetalol 300 mg p.o. twice daily, nifedipine 90 mg p.o. daily, hydralazine 25 mg p.o. every 8  Torsemide on hold since 10/23 secondary to concerns for overdiuresis  Hold off on torsemide for now as clinically appears euvolemic  Maintain MAP over 65

## 2024-10-28 PROBLEM — L60.2 OVERGROWN TOENAILS: Status: ACTIVE | Noted: 2024-10-28

## 2024-10-28 PROBLEM — Z91.89 AT RISK FOR ALTERATION IN BOWEL FUNCTION: Status: ACTIVE | Noted: 2024-10-28

## 2024-10-28 LAB
ANION GAP SERPL CALCULATED.3IONS-SCNC: 7 MMOL/L (ref 4–13)
BASOPHILS # BLD AUTO: 0.05 THOUSANDS/ΜL (ref 0–0.1)
BASOPHILS NFR BLD AUTO: 1 % (ref 0–1)
BUN SERPL-MCNC: 32 MG/DL (ref 5–25)
CALCIUM SERPL-MCNC: 8.4 MG/DL (ref 8.4–10.2)
CHLORIDE SERPL-SCNC: 108 MMOL/L (ref 96–108)
CO2 SERPL-SCNC: 24 MMOL/L (ref 21–32)
CREAT SERPL-MCNC: 3.55 MG/DL (ref 0.6–1.3)
EOSINOPHIL # BLD AUTO: 0.18 THOUSAND/ΜL (ref 0–0.61)
EOSINOPHIL NFR BLD AUTO: 3 % (ref 0–6)
ERYTHROCYTE [DISTWIDTH] IN BLOOD BY AUTOMATED COUNT: 14.5 % (ref 11.6–15.1)
GFR SERPL CREATININE-BSD FRML MDRD: 17 ML/MIN/1.73SQ M
GLUCOSE SERPL-MCNC: 121 MG/DL (ref 65–140)
GLUCOSE SERPL-MCNC: 132 MG/DL (ref 65–140)
GLUCOSE SERPL-MCNC: 142 MG/DL (ref 65–140)
GLUCOSE SERPL-MCNC: 168 MG/DL (ref 65–140)
GLUCOSE SERPL-MCNC: 90 MG/DL (ref 65–140)
GLUCOSE SERPL-MCNC: 97 MG/DL (ref 65–140)
HCT VFR BLD AUTO: 30 % (ref 36.5–49.3)
HGB BLD-MCNC: 9.8 G/DL (ref 12–17)
IMM GRANULOCYTES # BLD AUTO: 0.05 THOUSAND/UL (ref 0–0.2)
IMM GRANULOCYTES NFR BLD AUTO: 1 % (ref 0–2)
LYMPHOCYTES # BLD AUTO: 1.52 THOUSANDS/ΜL (ref 0.6–4.47)
LYMPHOCYTES NFR BLD AUTO: 24 % (ref 14–44)
MCH RBC QN AUTO: 26.8 PG (ref 26.8–34.3)
MCHC RBC AUTO-ENTMCNC: 32.7 G/DL (ref 31.4–37.4)
MCV RBC AUTO: 82 FL (ref 82–98)
MONOCYTES # BLD AUTO: 0.71 THOUSAND/ΜL (ref 0.17–1.22)
MONOCYTES NFR BLD AUTO: 11 % (ref 4–12)
NEUTROPHILS # BLD AUTO: 3.94 THOUSANDS/ΜL (ref 1.85–7.62)
NEUTS SEG NFR BLD AUTO: 60 % (ref 43–75)
NRBC BLD AUTO-RTO: 0 /100 WBCS
PHOSPHATE SERPL-MCNC: 3.6 MG/DL (ref 2.7–4.5)
PLATELET # BLD AUTO: 190 THOUSANDS/UL (ref 149–390)
PMV BLD AUTO: 10.4 FL (ref 8.9–12.7)
POTASSIUM SERPL-SCNC: 3.8 MMOL/L (ref 3.5–5.3)
RBC # BLD AUTO: 3.66 MILLION/UL (ref 3.88–5.62)
SODIUM SERPL-SCNC: 139 MMOL/L (ref 135–147)
WBC # BLD AUTO: 6.45 THOUSAND/UL (ref 4.31–10.16)

## 2024-10-28 PROCEDURE — 92507 TX SP LANG VOICE COMM INDIV: CPT

## 2024-10-28 PROCEDURE — 85025 COMPLETE CBC W/AUTO DIFF WBC: CPT | Performed by: NURSE PRACTITIONER

## 2024-10-28 PROCEDURE — 80048 BASIC METABOLIC PNL TOTAL CA: CPT | Performed by: NURSE PRACTITIONER

## 2024-10-28 PROCEDURE — 99232 SBSQ HOSP IP/OBS MODERATE 35: CPT | Performed by: INTERNAL MEDICINE

## 2024-10-28 PROCEDURE — 97129 THER IVNTJ 1ST 15 MIN: CPT

## 2024-10-28 PROCEDURE — 97530 THERAPEUTIC ACTIVITIES: CPT

## 2024-10-28 PROCEDURE — 82948 REAGENT STRIP/BLOOD GLUCOSE: CPT

## 2024-10-28 PROCEDURE — 94660 CPAP INITIATION&MGMT: CPT

## 2024-10-28 PROCEDURE — 97535 SELF CARE MNGMENT TRAINING: CPT

## 2024-10-28 PROCEDURE — 99222 1ST HOSP IP/OBS MODERATE 55: CPT | Performed by: INTERNAL MEDICINE

## 2024-10-28 PROCEDURE — 94003 VENT MGMT INPAT SUBQ DAY: CPT

## 2024-10-28 PROCEDURE — 84100 ASSAY OF PHOSPHORUS: CPT | Performed by: INTERNAL MEDICINE

## 2024-10-28 PROCEDURE — 97130 THER IVNTJ EA ADDL 15 MIN: CPT

## 2024-10-28 PROCEDURE — 97112 NEUROMUSCULAR REEDUCATION: CPT

## 2024-10-28 PROCEDURE — 94760 N-INVAS EAR/PLS OXIMETRY 1: CPT

## 2024-10-28 RX ORDER — LORATADINE 10 MG/1
5 TABLET ORAL DAILY
Status: DISCONTINUED | OUTPATIENT
Start: 2024-10-28 | End: 2024-11-18 | Stop reason: HOSPADM

## 2024-10-28 RX ORDER — POLYETHYLENE GLYCOL 3350 17 G/17G
17 POWDER, FOR SOLUTION ORAL DAILY
Status: DISCONTINUED | OUTPATIENT
Start: 2024-10-29 | End: 2024-11-18 | Stop reason: HOSPADM

## 2024-10-28 RX ORDER — FLUTICASONE PROPIONATE 50 MCG
1 SPRAY, SUSPENSION (ML) NASAL DAILY
Status: DISCONTINUED | OUTPATIENT
Start: 2024-10-28 | End: 2024-11-18 | Stop reason: HOSPADM

## 2024-10-28 RX ORDER — DOCUSATE SODIUM 100 MG/1
100 CAPSULE, LIQUID FILLED ORAL 2 TIMES DAILY
Status: DISCONTINUED | OUTPATIENT
Start: 2024-10-28 | End: 2024-11-18 | Stop reason: HOSPADM

## 2024-10-28 RX ORDER — SENNOSIDES 8.6 MG
2 TABLET ORAL
Status: DISCONTINUED | OUTPATIENT
Start: 2024-10-28 | End: 2024-11-18 | Stop reason: HOSPADM

## 2024-10-28 RX ORDER — ONDANSETRON 4 MG/1
4 TABLET, ORALLY DISINTEGRATING ORAL EVERY 6 HOURS PRN
Status: DISCONTINUED | OUTPATIENT
Start: 2024-10-28 | End: 2024-11-18 | Stop reason: HOSPADM

## 2024-10-28 RX ADMIN — BUPROPION HYDROCHLORIDE 100 MG: 100 TABLET, FILM COATED ORAL at 08:42

## 2024-10-28 RX ADMIN — CLOPIDOGREL BISULFATE 75 MG: 75 TABLET ORAL at 08:42

## 2024-10-28 RX ADMIN — NIFEDIPINE 90 MG: 30 TABLET, FILM COATED, EXTENDED RELEASE ORAL at 08:42

## 2024-10-28 RX ADMIN — DOCUSATE SODIUM 100 MG: 100 CAPSULE, LIQUID FILLED ORAL at 17:13

## 2024-10-28 RX ADMIN — FLUTICASONE PROPIONATE 1 SPRAY: 50 SPRAY, METERED NASAL at 17:13

## 2024-10-28 RX ADMIN — VALBENAZINE 40 MG: 40 CAPSULE ORAL at 21:10

## 2024-10-28 RX ADMIN — LABETALOL HYDROCHLORIDE 300 MG: 100 TABLET, FILM COATED ORAL at 21:11

## 2024-10-28 RX ADMIN — LABETALOL HYDROCHLORIDE 300 MG: 100 TABLET, FILM COATED ORAL at 08:42

## 2024-10-28 RX ADMIN — LORATADINE 5 MG: 10 TABLET ORAL at 17:13

## 2024-10-28 RX ADMIN — POLYETHYLENE GLYCOL 3350 17 G: 17 POWDER, FOR SOLUTION ORAL at 08:48

## 2024-10-28 RX ADMIN — HYDRALAZINE HYDROCHLORIDE 25 MG: 25 TABLET ORAL at 14:11

## 2024-10-28 RX ADMIN — OLANZAPINE 10 MG: 2.5 TABLET, FILM COATED ORAL at 21:11

## 2024-10-28 RX ADMIN — HYDRALAZINE HYDROCHLORIDE 25 MG: 25 TABLET ORAL at 21:11

## 2024-10-28 RX ADMIN — SENNOSIDES 17.2 MG: 8.6 TABLET, FILM COATED ORAL at 21:11

## 2024-10-28 RX ADMIN — HEPARIN SODIUM 7500 UNITS: 5000 INJECTION INTRAVENOUS; SUBCUTANEOUS at 05:48

## 2024-10-28 RX ADMIN — ATORVASTATIN CALCIUM 40 MG: 40 TABLET, FILM COATED ORAL at 17:13

## 2024-10-28 RX ADMIN — HEPARIN SODIUM 7500 UNITS: 5000 INJECTION INTRAVENOUS; SUBCUTANEOUS at 14:10

## 2024-10-28 RX ADMIN — BUPROPION HYDROCHLORIDE 100 MG: 100 TABLET, FILM COATED ORAL at 17:13

## 2024-10-28 RX ADMIN — PANTOPRAZOLE SODIUM 40 MG: 40 TABLET, DELAYED RELEASE ORAL at 05:48

## 2024-10-28 RX ADMIN — HEPARIN SODIUM 7500 UNITS: 5000 INJECTION INTRAVENOUS; SUBCUTANEOUS at 21:08

## 2024-10-28 RX ADMIN — HYDRALAZINE HYDROCHLORIDE 25 MG: 25 TABLET ORAL at 05:48

## 2024-10-28 RX ADMIN — DOCUSATE SODIUM 100 MG: 100 CAPSULE, LIQUID FILLED ORAL at 09:18

## 2024-10-28 RX ADMIN — ASPIRIN 81 MG: 81 TABLET, COATED ORAL at 08:42

## 2024-10-28 RX ADMIN — INSULIN LISPRO 1 UNITS: 100 INJECTION, SOLUTION INTRAVENOUS; SUBCUTANEOUS at 11:59

## 2024-10-28 NOTE — CONSULTS
Consultation - Internal Medicine   Name: Tommie Taylor 59 y.o. male I MRN: [id number]  Unit/Bed#: -01 I Date of Admission: 10/26/2024   Date of Service: 10/28/2024 I Hospital Day: 2   Inpatient consult to Internal Medicine  Consult performed by: SAMANTHA Kern  Consult ordered by: Alex George DO        Physician Requesting Evaluation: Cathy Miranda MD     Assessment & Plan  CVA (cerebral vascular accident) (HCC)  Initial ED presentation 10/14/2024 with RUE and RLE extremity weakness and R facial droop. CTH and CTA were initially negative for acute process, LKW was 1 hour prior to presentation.   , cardene drip initiated to stabilized BP, TNK administration after stabilization of HTN. Symptoms worsened s/p TNK but f/u CT again demonstrated no acute process.   24 hr post-TNK CT negative.   MRI completed, demonstrated indicated left corona radiata stroke.   DAPT initiated for 3 weeks with plan for transition to asa monotherapy.   Continue ASA 81mg and Plavix 75mg daily until 11/6 then transition to ASA 81mg daily monotherapy.  Cardiology recommends Zio patch after discharge.  Follow up with neurovascular after discharge.     PT/OT/ST per primary team.   Bipolar I disorder, severe, current or most recent episode depressed, with psychotic features ([facility])  Currently on bupropion 100mg BID and olanzapine 10mg at HS with valbenazine tosylate 40mg for tardive dyskinesia per home regimen. Follow up outpatient with psychiatry.   GERD (gastroesophageal reflux disease)  EGD with GI 10/14, directed to continue with Protonix. Tums available PRN for dyspepsia.   Insomnia  Educate and encourage on sleep hygiene. Continue Trazodone at HS for sleep.   DAISY (obstructive sleep apnea)  Continue use of CPAP with home settings.  Does not use CPAP at home - currently broken.  Recommend overnight oximetry prior to discharge.   Anemia, unspecified  Hemoglobin at admission 12.1.   10/21 HGB 10.4  10/28 HGB  9.8  Most recent iron panel on 10/15: Iron Sat 38%, TIBC 204, Iron 77, and Ferritin 111.  No need for iron supplementation at this time.  Obtain FOBT.  Continue to trend routine CBC.  Hyperlipidemia  Most recent lipid panel 10/16/24  Total Cholesterol 154  Trig 120  HDL 40  LDL 90   Continue statin therapy atorvastatin 40mg QPM increase from home 20mg daily.  Class 3 severe obesity due to excess calories with serious comorbidity and body mass index (BMI) of 40.0 to 44.9 in adult ([facility])  BMI 42.74 on admission. Encourage lifestyle modifications and provide support for nutritional teaching. Consult placed to nutrition services during acute course.   Tardive dyskinesia  Continue on home treatment Valbenazine Tosylate 40mg HS.   Hypertension  Presented with hypertensive emergency on initial hospitalization.   Concerns about medication non-compliance.  Home regimen: labetalol 100mg BID and amlodipine 10mg daily.  Target SBP 140s, currently on labetalol 300mg BID and Nifedipine 90mg daily with hydralazine 25mg Q8 hours. Torsemide D/C r/t ROSINA on 10/23.   Nephrology following.   DM2 (diabetes mellitus, type 2) ([facility])  Lab Results   Component Value Date    HGBA1C 8.6 (H) 10/16/2024       Recent Labs     10/27/24  1623 10/27/24  2107 10/28/24  0626 10/28/24  1124   POCGLU 108 107 90 168*       Blood Sugar Average: Last 72 hrs:  (P) 122.625    Continue insulin management with sliding scale.  Pt had been using ozempic for home glucose management but ran out and did not have any other glycemic control agents.     Acute kidney injury superimposed on stage 4 chronic kidney disease ([facility])  Lab Results   Component Value Date    EGFR 17 10/28/2024    EGFR 15 10/26/2024    EGFR 14 10/25/2024    CREATININE 3.55 (H) 10/28/2024    CREATININE 4.04 (H) 10/26/2024    CREATININE 4.21 (H) 10/25/2024     Baseline creatinine 2.5-2.9.   10/28/24 Creatinine 3.55 from 4.04, improving.   Avoid nephrotoxins.  Encourage hydration.  Continue to trend  CMPs M/W/F.  Renal ultrasound demonstrates no hydro but mild bladder wall thickening.   Nephrology following.   Follows with Dr. No (Nephrology) as outpatient.  Encephalopathy  Lethargy and encephalopathic symptoms noted during acute course.  B12 and head CT WNL. No subsequent episodes noted.   EEG on 10/22 consistent with moderate nonspecific cerebral dysfunction, no seizure activity.  Continue to monitor behavior and symptoms for signs of changes.   Most recent MoCA was 18/30 in 04/2024.  Per Psych - if continues to have delirium consider discontinuing Wellbutrin.  Concerns DAISY could also be contributing to encephalopathy per acute care.  Impaired mobility and activities of daily living  Continue PT/OT/ST treatment.   Overgrown toenails  Podiatry consult.   Vitamin D deficiency  Vitamin D level <7 on 10/7.  Continue home vitamin D 50,000u weekly.  Continue to follow-up with Nephrology as outpatient.    History of Present Illness:    Tommie Taylor is a 59 y.o. male with a PMH of T2DM, CKD4, HLD, Bipolar I, GERD, and DAISY. Pt presented to [facility] 10/14 with RUE and RLE weakness as well as right facial droop with onset 1 hour prior to presentation. SBP 230s, initial CT and CTA negative for acute process but TNK elected after HTN stabilization per Neurology. Pt experienced worsening of symptoms after administration of TNK but repeat CT continued to demonstrate no acute process. Follow up MRI demonstrated left corona radiata stroke, and pt placed on DAPT for 21 days with plan for ASA monotherapy ongoing afterward. Echo 10/19 demonstrated no arrhythmia and EF 70%, Zio patch recommended for outpatient follow up. Pt developed ROSINA on top of CKD4 in the acute course, nephrology consulted and following and creatinine slowly improving with management. Renal ultrasound demonstrates no hydronephrosis but mild bladder wall thickening. During admission, patient had multiple episode of encephalopathy.  EEG was performed and  showed consistent with moderate nonspecific cerebral dysfunction and no seizure activity.  Psychiatry was consulted and felt that Wellbutrin could be discontinued if continues to have further delirium.  Suspected that DAISY could be contributing to encephalopathy.  Pt screened by PT/OT/ST and recommended for acute rehab due to continuing RUE and RLE weakness.    Admitted to [facility] on 10/26/2024; we are consulted for medical clearance.        Patient received in recliner s/p PT session and tired. Patient alert, oriented, participating in assessment. Pt states he slept well last night, is using CPAP, and feels like he is getting stronger. Pt states his pharmacy has not had his ozempic for him to access it, discussed more sustainable/available solution for d/c for glucose management would be planned for home. Pt stated Hx poor vision r/t cataract surgery that was not effective. Pt sees better with glasses but needs large print. Patient states his wife manages his meds for him lately due to his vision, patient states he isn't familiar with his home medications. Patient reports no bowel movement since he has been in the hospital, pt does not report any abdominal pain, tenderness, or nausea.     Review of Systems:    Review of Systems   Constitutional:  Positive for fatigue (After therapy session). Negative for appetite change, chills and fever.   HENT:  Positive for rhinorrhea. Negative for congestion, ear pain, sinus pressure, sinus pain and sore throat.    Eyes:  Negative for pain and visual disturbance.        Pt stated Hx poor vision r/t cataract surgery that was not effective. Pt sees better with glasses but needs large print.    Respiratory:  Positive for cough (Began saturday with rhinorrhea, hx seasonal allergies). Negative for chest tightness, shortness of breath and wheezing.    Cardiovascular:  Negative for chest pain and palpitations.   Gastrointestinal:  Positive for constipation (Pt reports  no bm since hospitalization). Negative for abdominal pain, diarrhea, nausea and vomiting.   Genitourinary:  Negative for difficulty urinating, dysuria, frequency, hematuria and urgency.   Musculoskeletal:  Negative for arthralgias, back pain, myalgias and neck pain.   Skin:  Negative for color change, rash and wound.   Allergic/Immunologic: Positive for environmental allergies. Negative for food allergies.   Neurological:  Positive for facial asymmetry (R Facial droop) and weakness (RUE, RLE). Negative for dizziness, seizures, syncope, light-headedness and headaches.   Psychiatric/Behavioral:  Negative for confusion. The patient is not nervous/anxious.    All other systems reviewed and are negative.      Past Medical and Surgical History:     Past Medical History:   Diagnosis Date    ADHD, adult residual type     Anxiety     Anxiety     Bipolar 1 disorder (HCC)     Cataract     Left eye    Chronic kidney disease     Colon polyp     CPAP (continuous positive airway pressure) dependence     Depression     Depression     Diabetes mellitus (HCC)     blood sugar 167 on admission    Equinus deformity of foot     GERD (gastroesophageal reflux disease)     Homicidal ideations     Hyperlipidemia     Hypertension     Microalbuminuria     Morbid obesity (HCC)     Neuropathy     Shortness of breath     Sleep apnea     CPAP at bedtime    Sleep apnea     Stroke (HCC)     Suicidal intent        Past Surgical History:   Procedure Laterality Date    CATARACT EXTRACTION      CATARACT EXTRACTION      COLONOSCOPY      HAND SURGERY Right     OTHER SURGICAL HISTORY      REPAIR OF SUPERFICIAL WOUND FACE    WI ESOPHAGOGASTRODUODENOSCOPY TRANSORAL DIAGNOSTIC N/A 06/14/2018    Procedure: ESOPHAGOGASTRODUODENOSCOPY (EGD);  Surgeon: Yinka Maier MD;  Location: [facility];  Service: Gastroenterology    WI ESOPHAGOGASTRODUODENOSCOPY TRANSORAL DIAGNOSTIC N/A 09/12/2018    Procedure: EGD AND COLONOSCOPY;  Surgeon: Yinka Maier MD;  Location: [facility];  Service: Gastroenterology       Meds/Allergies:    all medications and allergies reviewed    Allergies:   Allergies   Allergen Reactions    Pollen Extract Nasal Congestion    Tetanus Toxoid Swelling       Social History:     Marital Status: /Civil Union    Substance Use History:   Social History     Substance and Sexual Activity   Alcohol Use Not Currently    Comment: rarely     Social History     Tobacco Use   Smoking Status Former    Current packs/day: 0.00    Types: Cigarettes    Quit date:     Years since quittin.8   Smokeless Tobacco Current    Types: Chew   Tobacco Comments    pt \"Quit after approx over 25 years ago\"     Social History     Substance and Sexual Activity   Drug Use Not Currently    Comment: quit 20 years ago       Family History:  Reviewed    Physical Exam:     Vitals:   Blood Pressure: 118/68 (10/28/24 1411)  Pulse: 60 (10/28/24 1411)  Temperature: (!) 96.9 °F (36.1 °C) (10/28/24 0543)  Temp Source: Tympanic (10/28/24 0543)  Respirations: 20 (10/28/24 0543)  Height: 5' 6\" (167.6 cm) (10/26/24 1700)  Weight - Scale: 120 kg (264 lb 12.8 oz) (10/26/24 1700)  SpO2: 100 % (10/28/24 0543)    Physical Exam  Vitals and nursing note reviewed.   Constitutional:       Appearance: He is obese. He is not ill-appearing or diaphoretic.   HENT:      Head: Normocephalic and atraumatic.      Nose: Rhinorrhea present. No congestion.      Mouth/Throat:      Mouth: Mucous membranes are moist.   Cardiovascular:      Rate and Rhythm: Normal rate and regular rhythm.      Pulses: Normal pulses.      Heart sounds: Normal heart sounds.   Pulmonary:      Effort: Pulmonary effort is normal.      Breath sounds: Examination of the right-lower field reveals decreased breath sounds. Examination of the left-lower field reveals decreased breath sounds. Decreased breath sounds present. No wheezing or rales.   Abdominal:      General: Bowel sounds are normal.      Palpations: Abdomen is soft.      " Tenderness: There is no abdominal tenderness. There is no guarding.   Musculoskeletal:         General: No tenderness.      Right lower le+ Edema present.      Left lower le+ Edema present.   Skin:     General: Skin is warm and dry.   Neurological:      Mental Status: He is alert.      Motor: Weakness (RUE, RLE) present.   Psychiatric:         Mood and Affect: Mood normal.         Behavior: Behavior normal.         Additional Data:     Labs and imaging reviewed.    EKG Reviewed - last EKG on 10/14 showed NSR with QTc of 445.    [employer] software was used to dictate this note.  It may contain errors with dictating incorrect words or incorrect spelling. Please contact the provider directly with any questions.

## 2024-10-28 NOTE — ASSESSMENT & PLAN NOTE
Lab Results   Component Value Date    HGBA1C 8.6 (H) 10/16/2024       Recent Labs     10/29/24  1118 10/29/24  1554 10/29/24  2053 10/30/24  0600   POCGLU 127 144* 124 96       Blood Sugar Average: Last 72 hrs:  (P) 120.5  Continue on insulin  most recent A1c 8.6% as of October 2024  Advised of tight glycemic control

## 2024-10-28 NOTE — ASSESSMENT & PLAN NOTE
Initially with hypertensive emergency  Currently on labetalol 300 mg p.o. twice daily, nifedipine 90 mg p.o. daily, hydralazine 25 mg p.o. every 8  Torsemide on hold since 10/23 secondary to concerns for overdiuresis  Hold off on torsemide for now as clinically appears euvolemic  Maintain MAP over 65  Clinically euvolemic hold off on diuretics for now

## 2024-10-28 NOTE — ASSESSMENT & PLAN NOTE
Presented with hypertensive emergency on initial hospitalization.   Concerns about medication non-compliance.  Home regimen: labetalol 100mg BID and amlodipine 10mg daily.  Target SBP 140s, currently on labetalol 300mg BID and Nifedipine 90mg daily with hydralazine 25mg Q8 hours. Torsemide D/C r/t ROSINA on 10/23.   Nephrology following.

## 2024-10-28 NOTE — ASSESSMENT & PLAN NOTE
Currently on bupropion 100mg BID and olanzapine 10mg at HS with valbenazine tosylate 40mg for tardive dyskinesia per home regimen. Follow up outpatient with psychiatry.

## 2024-10-28 NOTE — ASSESSMENT & PLAN NOTE
Most recent lipid panel 10/16/24  Total Cholesterol 154  Trig 120  HDL 40  LDL 90   Continue statin therapy atorvastatin 40mg QPM increase from home 20mg daily.

## 2024-10-28 NOTE — ASSESSMENT & PLAN NOTE
Lab Results   Component Value Date    HGBA1C 8.6 (H) 10/16/2024       Recent Labs     10/27/24  1623 10/27/24  2107 10/28/24  0626 10/28/24  1124   POCGLU 108 107 90 168*       Blood Sugar Average: Last 72 hrs:  (P) 122.625    Continue insulin management with sliding scale.  Pt had been using ozempic for home glucose management but ran out and did not have any other glycemic control agents.

## 2024-10-28 NOTE — ASSESSMENT & PLAN NOTE
Vitamin D level <7 on 10/7.  Continue home vitamin D 50,000u weekly.  Continue to follow-up with Nephrology as outpatient.

## 2024-10-28 NOTE — ASSESSMENT & PLAN NOTE
Lab Results   Component Value Date    EGFR 17 10/28/2024    EGFR 15 10/26/2024    EGFR 14 10/25/2024    CREATININE 3.55 (H) 10/28/2024    CREATININE 4.04 (H) 10/26/2024    CREATININE 4.21 (H) 10/25/2024     Baseline creatinine 2.5-2.9.   10/28/24 Creatinine 3.55 from 4.04, improving.   Avoid nephrotoxins.  Encourage hydration.  Continue to trend CMPs M/W/F.  Renal ultrasound demonstrates no hydro but mild bladder wall thickening.   Nephrology following.   Follows with Dr. No (Nephrology) as outpatient.

## 2024-10-28 NOTE — CASE MANAGEMENT
CM met with patient, explained rehab routine and CM role with introduction. Patient confirming he lives with his wife in [address] with 1STE. There is a full bath with walk in shower on the first floor. BEdrooms and additional bath on the second floor with a flight of stairs to second floot. Patient independent PTA, has a CPAP for DME. Patient has no  prior experience with outpt therapy, STR or HHC. Patient states he has prescription coverage and gets medications from [facility] on [facility] in [place]. CM confirmed patient's address, spouse emergency contact and insurance coverage with patient. CM explained team meeting process with medicare's guidelines for length of stay. CM will follow for discharge needs.

## 2024-10-28 NOTE — PROGRESS NOTES
Progress Note - PMR   Name: Tommie Taylor 59 y.o. male I MRN: [id number]  Unit/Bed#: [facility] 267-01 I Date of Admission: 10/26/2024   Date of Service: 10/28/2024 I Hospital Day: 2     Assessment & Plan  CVA (cerebral vascular accident) (HCC)  Patient with uncontrolled hypertension and diabetes presents with right upper and right lower extremity weakness as well as facial droop  A CT of the head as well as a CTA of the head and neck were completed with negative for acute abnormalities for an acute process  TNK had been administered following the correction of his hypertension after being placed on a Cardene drip  After ministration of the TNK developed worsening dysarthria as well as headache and a repeat CT was still negative.  The 24 post TNK CT was also negative  Neurology followed the patient and MRI was completed and was significant for left corona radiata stroke  Placed on dual antiplatelet therapy for 3 weeks with plans for monotherapy with aspirin and statin indefinitely for secondary stroke prophylaxis  Recommendation for a Zio patch as an outpatient  Follow-up with neurovascular attending in 6 to 8 weeks  Physical, occupational and speech therapy while on the acute rehabilitation unit  Bipolar I disorder, severe, current or most recent episode depressed, with psychotic features ([facility])  History of chronic bipolar 1 disorder and follows with outpatient psychiatry and also was seen inpatient  Mood has been stable and is maintained on Zyprexa, bupropion and trazodone as well as Ingrezza for tardive dyskinesia  Follow-up with psychiatry as an outpatient and consider virtual consultation if indicated for any changes while on ARC  GERD (gastroesophageal reflux disease)  Continue Protonix as well as as needed Tums  Insomnia  Continue trazodone and consider sleep logs  DAISY (obstructive sleep apnea)  Continue CPAP with home settings  Ordered and compliant per records  Anemia, unspecified  Hemoglobin most recently of  10.4 which is up from 9.3 but has been hovering in the 10-11 range for most of admission  Continue to monitor with biweekly CBC or sooner if clinically indicated  Hyperlipidemia  Continue Lipitor 40 mg every evening  Class 3 severe obesity due to excess calories with serious comorbidity and body mass index (BMI) of 40.0 to 44.9 in adult ([facility])  Continue with exercise and promote lifestyle modification, weight loss counseling and management of medical conditions to optimize status  Tardive dyskinesia  Continue Ingrezza 40 mg at bedtime  Hypertension  Presented to the hospital significantly elevated blood pressure with systolic ranging from 197-231  Was placed on a Cardizem drip initially for hypertensive emergency  Was previously on Norvasc 10 mg daily as well as labetalol 100 mg twice daily  Neurology was consulted for BP management and Norvasc was discontinued and started nifedipine and increased as well as a increase to labetalol to 300 mg twice daily  Had been on Demadex 20 mg daily but was on hold due to the increasing creatinine  Goals for normotension  Mgmt per IM  Follow-up with nephrology as an outpatient  DM2 (diabetes mellitus, type 2) ([facility])  Lab Results   Component Value Date    HGBA1C 8.6 (H) 10/16/2024       Recent Labs     10/27/24  1126 10/27/24  1623 10/27/24  2107 10/28/24  0626   POCGLU 146* 108 107 90     Most recent hemoglobin A1c of 8.6 and technically uncontrolled although blood sugars in the hospital have been well-controlled  Currently on semaglutide as an outpatient but was on hold due to ROSINA in the hospital  Continue sliding scale and 4 times daily Accu-Cheks and as well as a diabetic diet    Acute kidney injury superimposed on stage 4 chronic kidney disease ([facility])  Lab Results   Component Value Date    EGFR 17 10/28/2024    EGFR 15 10/26/2024    EGFR 14 10/25/2024    CREATININE 3.55 (H) 10/28/2024    CREATININE 4.04 (H) 10/26/2024    CREATININE 4.21 (H) 10/25/2024     Recent creatinine of  3.55 10/28 Prior baseline around 2.9-3.0  Etiology felt to be related potentially to ATN in the setting of uncontrolled hypertension and complicated by contrast associated nephropathy  Nephrology was consulted and followed and a UA showed microhematuria and proteinuria.  An ultrasound of the kidney/bladder showed wall thickening but no hydronephrosis  Bladder scans negative for retention  Monitor BMP biweekly or sooner if clinically indicated  Demadex has been on hold and had periodically required IV fluids  Follow with nephrology as an outpatient or sooner on the ARC if any acute changes  Encephalopathy  Had lethargy on exam back on 10/17 in acute care with improvements however more recently had issues with confusion and decreased consciousness in the mornings that improves throughout the day with unclear etiology  Prior history of cognitive impairments with last MoCA score of 18  CT of the head was stable, B12 level (446) wnl  Was evaluated by psychiatry with no changes in medications recommended  EEG showed no epileptiform activity just moderate nonspecific dysfunction  Will need to monitor especially with change in environment now on the ARC for any changes  Impaired mobility and activities of daily living  Patient was evaluated by the rehabilitation team MD and an appropriate candidate for acute inpatient rehabilitation program at this time.  The patient will tolerate 3 hours/day 5 to 7 days/week of intensive physical, occupational and speech therapy in order to obtain goals for community discharge  Due to the patient's functional Compared to their baseline level of function in addition to their ongoing medical needs, the patient would benefit from daily supervision from a rehabilitation physician as well as rehabilitation nursing to implement and adjust the medical as well as functional plan of care in order to meet the patient's goals.    At risk for alteration in bowel function  Constipated no bm for several  days   Start colase, senna daily and miralax prn  Titrate as needed   Overgrown toenails  Cs podiatry     History of Present Illness   Tommie Taylor is a 59 y.o. male with history of bipolar disorder, CKD stage IV, type 2 diabetes, hypertension, tardive dyskinesia, sleep apnea, GERD who presented to the [facility] on 10/14/2024 for right-sided weakness and facial droop.  MRI revealed a left corona radiata infarct and neurology was consulted and was administered TNK.  He initially required a Cardene drip for his hypertensive emergency and was eventually placed on dual antiplatelet therapy with plan to continue aspirin and Plavix until that date and continue with aspirin as monotherapy as well as atorvastatin for secondary stroke prophylaxis.  He was recommended for Zio patch placement as an outpatient.  His hospital course was complicated by acute kidney injury on top of his CKD thought to be related to ATN in the setting of uncontrolled blood pressure as well as contrast associated nephropathy.  Nephrology did follow during his course and had some levels of improvement however has not returned back to his baseline level of creatinine.  Additionally there was some levels of encephalopathy which are stable at this time. The patient was evaluated by the Rehabilitation team and deemed an appropriate candidate for comprehensive inpatient rehabilitation and admitted to the [facility] on 10/26/2024  5:13 PM     Rehab Diagnosis: Impairment of mobility, safety, Activities of Daily Living (ADLs), and cognitive/communication skills due to Stroke:  01.2  Right Body Involvement (Left Brain)  Etiologic Dx: Left Corona Radiata Infarct  Date of Onset: 10/14/2024   Date of surgery: N/A  Chief Complaint: f/u stroke    Interval: Patient seen and examined in room getting ready to eat lunch. No events overnight.  Reports overall feeling well. Does not have any documented BM. Sleeping well at night. Denies any  f/c/n/v, CP, SOB, abdominal pain, constipation, or diarrhea.       Objective   Functional Update:  PT/OT/SLP: TBD     Temp:  [96.9 °F (36.1 °C)-97.9 °F (36.6 °C)] 96.9 °F (36.1 °C)  HR:  [64-72] 72  Resp:  [20] 20  BP: (128-164)/(67-91) 128/67  SpO2:  [98 %-100 %] 100 %    Physical Exam    Gen: No acute distress, obese  HEENT: Moist mucus membranes, Normocephalic/Atraumatic  Cardiovascular: Regular rate, rhythm,  Heme/Extr: bilateral LE edema w/ overgrown toenails   Pulmonary: Non-labored breathing. Lungs CTAB  : No amezquita  GI:  non-distended. BS+  MSK: ROM is WFL in all extremities.   Integumentary: Skin is warm, dry. .  Neuro: dysarthria right sided facial droop, RUE weakness 2/5 shoulder abd, 1/5 elbow flexion and 1/5 extension    Psych: flat  affect.       Scheduled Meds:  Current Facility-Administered Medications   Medication Dose Route Frequency Provider Last Rate    acetaminophen  650 mg Oral Q6H PRN Alex S George, DO      aspirin  81 mg Oral Daily Alex S George, DO      atorvastatin  40 mg Oral QPM Alex S George, DO      buPROPion  100 mg Oral BID Alex S George, DO      calcium carbonate  1,000 mg Oral BID PRN Alex S George, DO      clopidogrel  75 mg Oral Daily Alex S George, DO      heparin (porcine)  7,500 Units Subcutaneous Q8H [facility] Alex S George, DO      hydrALAZINE  25 mg Oral Q8H [facility] Alex S George, DO      insulin lispro  1-5 Units Subcutaneous HS Alex S George, DO      insulin lispro  1-6 Units Subcutaneous TID AC Alex S George, DO      labetalol  300 mg Oral BID Alex S George, DO      NIFEdipine  90 mg Oral Daily Alex S George, DO      OLANZapine  10 mg Oral HS Alex S George, DO      pantoprazole  40 mg Oral Early Morning Alex S George, DO      polyethylene glycol  17 g Oral Daily PRN Alex S George, DO      traZODone  50 mg Oral HS PRN Alex S George, DO      Valbenazine Tosylate  40 mg Oral HS Alex George, DO           Lab Results: I have reviewed the following results:      Results from last 7  days   Lab Units 10/28/24  0501 10/26/24  0842 10/25/24  0755   BUN mg/dL 32* 36* 41*   SODIUM mmol/L 139 140 141   POTASSIUM mmol/L 3.8 3.8 4.1   CHLORIDE mmol/L 108 111* 112*   CREATININE mg/dL 3.55* 4.04* 4.21*              Cathy Miranda MD   Physical Medicine and Rehabilitation   10/28/24    I have spent a total time of 37 minutes in caring for this patient on the day of the visit/encounter including Counseling / Coordination of care, Documenting in the medical record, Reviewing / ordering tests, medicine, procedures  , and Communicating with other healthcare professionals .

## 2024-10-28 NOTE — ASSESSMENT & PLAN NOTE
Hemoglobin at admission 12.1.   10/21 HGB 10.4  10/28 HGB 9.8  Most recent iron panel on 10/15: Iron Sat 38%, TIBC 204, Iron 77, and Ferritin 111.  No need for iron supplementation at this time.  Obtain FOBT.  Continue to trend routine CBC.

## 2024-10-28 NOTE — PROGRESS NOTES
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   Shower/Bathe Self CARE Score 3   Upper Body Dressing   Type of Assistance Needed Physical assistance   Physical Assistance Level 26%-50%   Comment estefania dressing technique provided   Upper Body Dressing CARE Score 3   Lower Body Dressing   Type of Assistance Needed Physical assistance   Physical Assistance Level 26%-50%   Comment threading sweat pants, Pt declined wearing underwear, Mod A in stance for balance and to bring pants over right hip   Lower Body Dressing CARE Score 3   Putting On/Taking Off Footwear   Type of Assistance Needed Physical assistance   Physical Assistance Level 26%-50%   Comment Pt able to doff slipper socks but not don. Pt did not want to put on shoes   Putting On/Taking Off Footwear CARE Score 3   Picking Up Object   Reason if not Attempted Safety concerns   Picking Up Object CARE Score 88   Sit to Lying   Type of Assistance Needed Physical assistance   Physical Assistance Level 26%-50%   Comment assist to " bring legs onto bed   Sit to Lying CARE Score 3   Lying to Sitting on Side of Bed   Type of Assistance Needed Physical assistance   Physical Assistance Level 25% or less   Comment min A for safety, Pt impulsive   Lying to Sitting on Side of Bed CARE Score 3   Sit to Stand   Type of Assistance Needed Physical assistance   Physical Assistance Level 26%-50%   Comment mod A   Sit to Stand CARE Score 3   Bed-Chair Transfer   Type of Assistance Needed Physical assistance   Physical Assistance Level 51%-75%   Comment Mod A sit pivot and stand pivot without AD   Chair/Bed-to-Chair Transfer CARE Score 2   Toileting Hygiene   Type of Assistance Needed Physical assistance   Physical Assistance Level 51%-75%   Comment Pt able to use the urinal at EOB when not yet clothed   Toileting Hygiene CARE Score 2   Functional Standing Tolerance   Time 2m13s   Activity peg board with left hand unsupported   ROM- Right Upper Extremities   R Shoulder PROM   R Elbow PROM   R Wrist PROM   RUE ROM Comment increased flexor tone in shoulder and elbow, reduced with PROM, education on positioning of RUE to reduce tone and maximize elbow extension.   Neuromuscular Education   Comments use of estim to posterior shoulder, tricep, forearm to facilitate extension and reduction of flexor tone.   Cognition   Overall Cognitive Status Impaired   Arousal/Participation Alert;Cooperative   Attention Attends with cues to redirect   Orientation Level Oriented X4   Memory Decreased recall of recent events;Decreased recall of precautions   Following Commands Follows one step commands with increased time or repetition   Activity Tolerance   Activity Tolerance Patient tolerated treatment well   Assessment   Treatment Assessment Pt engaged in 90min OT session focused on selfcare performance with training on hemidressing techniques and safety risks. Pt with increased RUE flexor tone, responding well to estim posterior UE and PROM techniques. Pt needinf frequent cues  for inattention to right U/LE throughout funcitonal tasks. Pt continues to benefit from skilled OT intervention to improve balance, selfcare, NMR of RUE to maximzie functional independence under current POT.   Prognosis Good   Problem List Decreased strength;Decreased range of motion;Impaired balance;Decreased endurance;Decreased mobility;Decreased coordination;Decreased safety awareness;Obesity   Barriers to Discharge Inaccessible home environment;Decreased caregiver support   Plan   Treatment/Interventions ADL retraining;Functional transfer training;Endurance training;Therapeutic exercise;Patient/family training;Compensatory technique education   OT Therapy Minutes   OT Time In 0700   OT Time Out 0830   OT Total Time (minutes) 90   OT Mode of treatment - Individual (minutes) 90   OT Mode of treatment - Concurrent (minutes) 0   OT Mode of treatment - Group (minutes) 0   OT Mode of treatment - Co-treat (minutes) 0   OT Mode of Treatment - Total time(minutes) 90 minutes   OT Cumulative Minutes 180

## 2024-10-28 NOTE — ASSESSMENT & PLAN NOTE
Constipated no bm for several days   Start colase, senna daily and miralax prn  Titrate as needed

## 2024-10-28 NOTE — ASSESSMENT & PLAN NOTE
Continue use of CPAP with home settings.  Does not use CPAP at home - currently broken.  Recommend overnight oximetry prior to discharge.

## 2024-10-28 NOTE — PROGRESS NOTES
10/28/24 1400   Pain Assessment   Pain Assessment Tool 0-10   Pain Score No Pain   Restrictions/Precautions   Precautions Bed/chair alarms;Aspiration;Cognitive;Fall Risk;Supervision on toilet/commode   Subjective   Subjective Reports being tired, no pain, no c/o dizziness or lightheded feeling with any mobility   Sit to Stand   Type of Assistance Needed Physical assistance;Verbal cues   Physical Assistance Level 26%-50%   Comment Improved to CAG with cueing to maintain fwd wt shift & hip flexion   Sit to Stand CARE Score 3   Bed-Chair Transfer   Type of Assistance Needed Physical assistance;Adaptive equipment;Verbal cues   Physical Assistance Level 26%-50%   Comment Stand pivot with use of HW, cueing for positioning of device.   Chair/Bed-to-Chair Transfer CARE Score 3   Transfer Bed/Chair/Wheelchair   Findings (S)  Will need ongoing prcatice for bed mobility and proper positioning of R UE. PLEASE trial Right sidelying for TONE.   Car Transfer   Type of Assistance Needed Physical assistance   Physical Assistance Level 51%-75%   Car Transfer CARE Score 2   Walk 10 Feet   Type of Assistance Needed Physical assistance   Physical Assistance Level Total assistance   Comment Left HHA mod A with chair follow   Walk 10 Feet CARE Score 1   Walk 50 Feet with Two Turns   Reason if not Attempted Safety concerns   Walk 50 Feet with Two Turns CARE Score 88   Walk 150 Feet   Reason if not Attempted Safety concerns   Walk 150 Feet CARE Score 88   Walking 10 Feet on Uneven Surfaces   Reason if not Attempted Safety concerns   Walking 10 Feet on Uneven Surfaces CARE Score 88   Ambulation   Primary Mode of Locomotion Prior to Admission Walk   Distance Walked (feet) 30 ft   Assist Device Hand Hold   Gait Pattern Trendelenburg;R hemiparesis;Decreased foot clearance;Improper weight shift;Step to;Step through;R foot drag   Limitations Noted In Swing;Strength;Speed;Heel Strike;Endurance;Balance   Provided Assistance with: Weight  Shift;Balance;Trunk Support   Walk Assist Level Moderate Assist;Chair Follow   Findings (S)  Attempted HHA on left, safer at this time to work on Long HR or use BWSS.   Does the patient walk? 2. Yes   Wheel 50 Feet with Two Turns   Type of Assistance Needed Physical assistance   Physical Assistance Level 51%-75%   Wheel 50 Feet with Two Turns CARE Score 2   Wheel 150 Feet   Type of Assistance Needed Physical assistance   Physical Assistance Level 76% or more   Wheel 150 Feet CARE Score 2   Wheelchair mobility   Does the patient use a wheelchair? 1. Yes   Type of Wheelchair Used 1. Manual   Method Right lower extremity;Left lower extremity   Assistance Provided For Locking Brakes;Remove Leg Rest;Replace Leg Rest;Remove armrests;Replace armrests   Distance Level Surface (feet) 150 ft   Curb or Single Stair   Style negotiated Single stair   Type of Assistance Needed Physical assistance   Physical Assistance Level Total assistance   Comment L HR Max A with 2nd person guarding as well for safety   1 Step (Curb) CARE Score 1   4 Steps   Reason if not Attempted Safety concerns   4 Steps CARE Score 88   12 Steps   Reason if not Attempted Safety concerns   12 Steps CARE Score 88   Therapeutic Interventions   Flexibility Gentle passive stretch of R UE, with 2.5# wt then placed on wrist for prolonged distarction while seated and walking due to hypertoncity R Pectorals and Biceps, Shoulder stable   Neuromuscular Re-Education Repeated walking fwd/bkwd along L HR 30ft x 4 bouts, fatigued easily with seated rest breaks btwn; Working at  steps on wt shift to the left and R foot on/off step 4 x 5 reps   Other Prcaticed Stand pivot transfers left/right chair to chair with HW   Assessment   Treatment Assessment Pt participating well in 60 min therapy session focused on transfers with HW, walking. Educated pt on R UE positioning with pillows used for support when up in recliner chair. Noted Right hip weakness affceting stability  in stance and advancement of limb with walking. Assistance also need for proper wt shift.   PT Therapy Minutes   PT Time In 1400   PT Time Out 1500   PT Total Time (minutes) 60   PT Mode of treatment - Individual (minutes) 60   PT Mode of treatment - Concurrent (minutes) 0   PT Mode of treatment - Group (minutes) 0   PT Mode of treatment - Co-treat (minutes) 0   PT Mode of Treatment - Total time(minutes) 60 minutes   PT Cumulative Minutes 120   Therapy Time missed   Time missed? No

## 2024-10-28 NOTE — PLAN OF CARE
Problem: PAIN - ADULT  Goal: Verbalizes/displays adequate comfort level or baseline comfort level  Description: Interventions:  - Encourage patient to monitor pain and request assistance  - Assess pain using appropriate pain scale  - Administer analgesics based on type and severity of pain and evaluate response  - Implement non-pharmacological measures as appropriate and evaluate response  - Consider cultural and social influences on pain and pain management  - Notify physician/advanced practitioner if interventions unsuccessful or patient reports new pain  Outcome: Progressing     Problem: SAFETY ADULT  Goal: Patient will remain free of falls  Description: INTERVENTIONS:  - Educate patient/family on patient safety including physical limitations  - Instruct patient to call for assistance with activity   - Consult OT/PT to assist with strengthening/mobility   - Keep Call bell within reach  - Keep bed low and locked with side rails adjusted as appropriate  - Keep care items and personal belongings within reach  - Initiate and maintain comfort rounds  - Make Fall Risk Sign visible to staff  - Offer Toileting every  Hours, in advance of need  - Initiate/Maintain alarm  - Obtain necessary fall risk management equipment: - Apply yellow socks and bracelet for high fall risk patients  - Consider moving patient to room near nurses station  Outcome: Progressing

## 2024-10-28 NOTE — PROGRESS NOTES
Progress Note - Nephrology   Name: Tommie Taylor 59 y.o. male I MRN: [id number]  Unit/Bed#: [facility] 267-01 I Date of Admission: 10/26/2024   Date of Service: 10/30/2024 I Hospital Day: 4      59-year-old male with multiple comorbidities including CKD stage IIIb and hypertension presented initially to outside facility with right-sided weakness nephrology was managing for acute kidney injury subsequently transferred over to [facility] rehab.   Assessment & Plan  Acute kidney injury superimposed on stage 4 chronic kidney disease (HCC)  Lab Results   Component Value Date    EGFR 17 10/30/2024    EGFR 17 10/28/2024    EGFR 15 10/26/2024    CREATININE 3.63 (H) 10/30/2024    CREATININE 3.55 (H) 10/28/2024    CREATININE 4.04 (H) 10/26/2024   Review of record shows patient has a baseline creatinine around 2.5 to 2.9 mg/dL  Creatinine on admission at [facility] was at 3.24 mg/dL on 10/14/2024  Creatinine on admission at [place] at 4.04 mg/dL on 10/26/2024  Peak creatinine was thought to be 4.57 mg/dL on 10/21/2024  ROSINA thought to be secondary to IAN 10/14/2024 + ischemic injury secondary to severe hemodynamic perturbations plus some component of prerenal azotemia with subsequent ATN.  Creatinine today is at 3.63 milligrams per deciliter appears to be plateauing  We will need to see where his new baseline creatinine will be  Continue to hold diuretics for now as appears euvolemic  Discussed with patient would like to wait a period of 3 months to see where new baseline creatinine will be  Workup revealed urine protein creatinine ratio 8.4 g prior to admission  Renal ultrasound no hydronephrosis  As an outpatient follows up with Dr. No for nephrology  Likely his underlying CKD secondary to age-related nephron loss plus hypertensive nephrosclerosis plus prior episode of acute kidney injury nondialysis requiring.  Stable for discharge from renal stable medically cleared will need outpatient follow-up upon discharge  While in  rehab recommend labs Monday Wednesday Friday  CVA (cerebral vascular accident) ([facility])  Follow-up with neurology  Status post TNK administration  Bipolar I disorder, severe, current or most recent episode depressed, with psychotic features ([facility])  Follow-up with psych  Currently on Zyprexa and Wellbutrin  Anemia, unspecified  Most recent hemoglobin stable at 9.4 g/dL  Maintain hemoglobin greater than 8 g/dL  Continue to monitor hemoglobin for now  Hyperlipidemia  Continue on atorvastatin  Hypertension  Initially with hypertensive emergency  Currently on labetalol 300 mg p.o. twice daily, nifedipine 90 mg p.o. daily, hydralazine 25 mg p.o. every 8  Torsemide on hold since 10/23 secondary to concerns for overdiuresis  Hold off on torsemide for now as clinically appears euvolemic  Maintain MAP over 65  Clinically euvolemic hold off on diuretics for now  DM2 (diabetes mellitus, type 2) ([facility])  Lab Results   Component Value Date    HGBA1C 8.6 (H) 10/16/2024       Recent Labs     10/29/24  1118 10/29/24  1554 10/29/24  2053 10/30/24  0600   POCGLU 127 144* 124 96       Blood Sugar Average: Last 72 hrs:  (P) 120.5  Continue on insulin  most recent A1c 8.6% as of October 2024  Advised of tight glycemic control  Impaired mobility and activities of daily living  Currently with wheelchair  Follow-up with [facility] rehab    I have reviewed the nephrology recommendations including continue to hold diuretics check blood work Monday Wednesday Friday, with medicine team, and we are in agreement with renal plan including the information outlined above.     Subjective   Brief History of Admission -patient seen and examined in his room urine output 1.3 L plus/24 hours feels well has no complaints afebrile, reports he may be going home potentially Friday but is unsure  Objective :  Temp:  [96.1 °F (35.6 °C)-98.4 °F (36.9 °C)] 96.1 °F (35.6 °C)  HR:  [64-80] 80  BP: (130-135)/(66-75) 130/67  Resp:  [18] 18  SpO2:  [97 %-100 %] 98 %  O2 Device:  CPAP    Current Weight: Weight - Scale: 120 kg (264 lb 12.8 oz)  First Weight: Weight - Scale: 120 kg (264 lb 12.8 oz)  I/O         10/26 0701  10/27 0700 10/27 0701  10/28 0700 10/28 0701  10/29 0700    P.O. 240 840     Total Intake(mL/kg) 240 (2) 840 (7)     Urine (mL/kg/hr) 300 1020 (0.4)     Total Output 300 1020     Net -60 -180            Unmeasured Urine Occurrence  1 x           Physical Exam  Vitals and nursing note reviewed.   Constitutional:       General: He is not in acute distress.     Appearance: Normal appearance. He is normal weight. He is not ill-appearing, toxic-appearing or diaphoretic.   HENT:      Head: Normocephalic and atraumatic.      Mouth/Throat:      Mouth: Mucous membranes are moist.      Pharynx: No oropharyngeal exudate.   Eyes:      General: No scleral icterus.  Cardiovascular:      Rate and Rhythm: Normal rate.   Pulmonary:      Effort: No respiratory distress.      Breath sounds: Normal breath sounds. No wheezing.   Abdominal:      General: There is no distension.      Palpations: Abdomen is soft.      Tenderness: There is no abdominal tenderness.   Musculoskeletal:         General: No swelling.      Cervical back: No rigidity.   Skin:     Coloration: Skin is not jaundiced.   Neurological:      General: No focal deficit present.      Mental Status: He is alert. Mental status is at baseline.   Psychiatric:         Mood and Affect: Mood normal.       ROS:  Constitutional:  No chills, no fever.   HENT:  No sore throat  Respiratory:  No cough, no hemoptysis, no wheezing.    Cardiovascular:  no leg swelling.   Gastrointestinal:  No constipation, no diarrhea.   Musculoskeletal:  No back pain.   Neurological:  no dizziness, No headaches.   Psychiatric/Behavioral:  No agitation,   All other systems reviewed and are negative.        Medications:    Current Facility-Administered Medications:     acetaminophen (TYLENOL) tablet 650 mg, 650 mg, Oral, Q6H PRN, Alex George DO, 650 mg at  10/26/24 1842    aspirin (ECOTRIN LOW STRENGTH) EC tablet 81 mg, 81 mg, Oral, Daily, Alex George DO, 81 mg at 10/29/24 0853    atorvastatin (LIPITOR) tablet 40 mg, 40 mg, Oral, QPM, Alex George DO, 40 mg at 10/29/24 1715    buPROPion (WELLBUTRIN) tablet 100 mg, 100 mg, Oral, BID, Alex George DO, 100 mg at 10/29/24 1715    calcium carbonate (TUMS) chewable tablet 1,000 mg, 1,000 mg, Oral, BID PRN, Alex George DO    clopidogrel (PLAVIX) tablet 75 mg, 75 mg, Oral, Daily, Alex George DO, 75 mg at 10/29/24 0853    docusate sodium (COLACE) capsule 100 mg, 100 mg, Oral, BID, Cathy Miranda MD, 100 mg at 10/29/24 1715    fluticasone (FLONASE) 50 mcg/act nasal spray 1 spray, 1 spray, Each Nare, Daily, SAMANTHA Kern, 1 spray at 10/29/24 0852    heparin (porcine) subcutaneous injection 7,500 Units, 7,500 Units, Subcutaneous, Q8H MIGEL, Alex George DO, 7,500 Units at 10/30/24 0607    hydrALAZINE (APRESOLINE) tablet 25 mg, 25 mg, Oral, Q8H MIGEL, Alex George DO, 25 mg at 10/30/24 0607    insulin lispro (HumALOG/ADMELOG) 100 units/mL subcutaneous injection 1-5 Units, 1-5 Units, Subcutaneous, HS, Alex George DO    insulin lispro (HumALOG/ADMELOG) 100 units/mL subcutaneous injection 1-6 Units, 1-6 Units, Subcutaneous, TID AC, 1 Units at 10/28/24 1159 **AND** Fingerstick Glucose (POCT), , , 4x Daily AC and at bedtime, Alex George DO    labetalol (NORMODYNE) tablet 300 mg, 300 mg, Oral, BID, Alex Georeg DO, 300 mg at 10/29/24 2129    lactulose (CHRONULAC) oral solution 20 g, 20 g, Oral, TID PRN, Cathy Miranda MD, 20 g at 10/29/24 1016    loratadine (CLARITIN) tablet 5 mg, 5 mg, Oral, Daily, SAMANTHA Kern, 5 mg at 10/29/24 0853    NIFEdipine (PROCARDIA XL) 24 hr tablet 90 mg, 90 mg, Oral, Daily, Alex George DO, 90 mg at 10/29/24 0853    OLANZapine (ZyPREXA) tablet 10 mg, 10 mg, Oral, HS, Alex George DO, 10 mg at 10/29/24 2129    ondansetron (ZOFRAN-ODT) dispersible tablet 4  "mg, 4 mg, Oral, Q6H PRN, SAMANTHA Kern    pantoprazole (PROTONIX) EC tablet 40 mg, 40 mg, Oral, Early Morning, Alex George DO, 40 mg at 10/30/24 0607    polyethylene glycol (MIRALAX) packet 17 g, 17 g, Oral, Daily, Cathy Miranda MD, 17 g at 10/29/24 0851    senna (SENOKOT) tablet 17.2 mg, 2 tablet, Oral, HS, Cathy Miranda MD, 17.2 mg at 10/29/24 2128    traZODone (DESYREL) tablet 50 mg, 50 mg, Oral, HS PRN, Alex George DO    Valbenazine Tosylate CAPS 40 mg, 40 mg, Oral, HS, Alex George DO, 40 mg at 10/29/24 2130      Lab Results: I have reviewed the following results:  Results from last 7 days   Lab Units 10/30/24  0601 10/28/24  1022 10/28/24  0501 10/26/24  0842 10/25/24  0755 10/24/24  0526   WBC Thousand/uL 7.66 6.45  --   --   --   --    HEMOGLOBIN g/dL 9.4* 9.8*  --   --   --   --    HEMATOCRIT % 27.5* 30.0*  --   --   --   --    PLATELETS Thousands/uL 192 190  --   --   --   --    POTASSIUM mmol/L 4.6  --  3.8 3.8 4.1 4.1   CHLORIDE mmol/L 109*  --  108 111* 112* 111*   CO2 mmol/L 23 --  24 24 24 23   BUN mg/dL 32*  --  32* 36* 41* 43*   CREATININE mg/dL 3.63*  --  3.55* 4.04* 4.21* 4.31*   CALCIUM mg/dL 8.6  --  8.4 8.5 8.7 8.6   PHOSPHORUS mg/dL 3.8  --  3.6  --   --   --        Administrative Statements     Portions of the record may have been created with voice recognition software. Occasional wrong word or \"sound a like\" substitutions may have occurred due to the inherent limitations of voice recognition software. Read the chart carefully and recognize, using context, where substitutions have occurred.If you have any questions, please contact the dictating provider.  "

## 2024-10-28 NOTE — ASSESSMENT & PLAN NOTE
Lab Results   Component Value Date    EGFR 17 10/30/2024    EGFR 17 10/28/2024    EGFR 15 10/26/2024    CREATININE 3.63 (H) 10/30/2024    CREATININE 3.55 (H) 10/28/2024    CREATININE 4.04 (H) 10/26/2024   Review of record shows patient has a baseline creatinine around 2.5 to 2.9 mg/dL  Creatinine on admission at [facility] was at 3.24 mg/dL on 10/14/2024  Creatinine on admission at [place] at 4.04 mg/dL on 10/26/2024  Peak creatinine was thought to be 4.57 mg/dL on 10/21/2024  ROSINA thought to be secondary to IAN 10/14/2024 + ischemic injury secondary to severe hemodynamic perturbations plus some component of prerenal azotemia with subsequent ATN.  Creatinine today is at 3.63 milligrams per deciliter appears to be plateauing  We will need to see where his new baseline creatinine will be  Continue to hold diuretics for now as appears euvolemic  Discussed with patient would like to wait a period of 3 months to see where new baseline creatinine will be  Workup revealed urine protein creatinine ratio 8.4 g prior to admission  Renal ultrasound no hydronephrosis  As an outpatient follows up with Dr. No for nephrology  Likely his underlying CKD secondary to age-related nephron loss plus hypertensive nephrosclerosis plus prior episode of acute kidney injury nondialysis requiring.  Stable for discharge from renal stable medically cleared will need outpatient follow-up upon discharge  While in rehab recommend labs Monday Wednesday Friday

## 2024-10-28 NOTE — ASSESSMENT & PLAN NOTE
BMI 42.74 on admission. Encourage lifestyle modifications and provide support for nutritional teaching. Consult placed to nutrition services during acute course.

## 2024-10-28 NOTE — ASSESSMENT & PLAN NOTE
Initial ED presentation 10/14/2024 with RUE and RLE extremity weakness and R facial droop. CTH and CTA were initially negative for acute process, LKW was 1 hour prior to presentation.   , cardene drip initiated to stabilized BP, TNK administration after stabilization of HTN. Symptoms worsened s/p TNK but f/u CT again demonstrated no acute process.   24 hr post-TNK CT negative.   MRI completed, demonstrated indicated left corona radiata stroke.   DAPT initiated for 3 weeks with plan for transition to asa monotherapy.   Continue ASA 81mg and Plavix 75mg daily until 11/6 then transition to ASA 81mg daily monotherapy.  Cardiology recommends Zio patch after discharge.  Follow up with neurovascular after discharge.     PT/OT/ST per primary team.

## 2024-10-28 NOTE — PCC NURSING
Tommie Taylor is a 59 y.o. male with history of bipolar disorder, CKD stage IV, type 2 diabetes, hypertension, tardive dyskinesia, sleep apnea, GERD who presented to the [facility] on 10/14/2024 for right-sided weakness and facial droop.  MRI revealed a left corona radiata infarct and neurology was consulted and was administered TNK.  He initially required a Cardene drip for his hypertensive emergency and was eventually placed on dual antiplatelet therapy with plan to continue aspirin and Plavix until that date and continue with aspirin as monotherapy as well as atorvastatin for secondary stroke prophylaxis.  He was recommended for Zio patch placement as an outpatient.  His hospital course was complicated by acute kidney injury on top of his CKD thought to be related to ATN in the setting of uncontrolled blood pressure as well as contrast associated nephropathy.  Nephrology did follow during his course and had some levels of improvement however has not returned back to his baseline level of creatinine.  Additionally there was some levels of encephalopathy which are stable at this time. The patient was evaluated by the Rehabilitation team and deemed an appropriate candidate for comprehensive inpatient rehabilitation and admitted to the [facility] on 10/26/2024  5:13 PM      Pain managed with tylenol 650mg PRN Q6h. DVT prophylaxis heparin 7,500 Q8h. CVA managed with ASA 81 mg daily, lipitor 40 mg daily, plavix 75 mg daily, heparin. Recommendation for a Zio patch as an outpatient. DM managed with Accu-Checks BID, as well as a diabetic diet. HTN managed with hydralazine 10 mg Q8h, labetalol 300 mg BID, and nifedipine 60 mg daily. Tardive dyskinesia managed with Valbenazine Tosylate 40 mg daily at bedtime. HLD managed with lipitor 40 mg every evening. DAISY managed with CPAP. Insomnia managed with trazadone 50 mg PRN at bedtime and Melatonin 3 mg. GERD managed with protonix 40 mg daily and PRN TUMS.  Bipolar I disorder managed with Zyprexa 10 mg daily at bedtime and bupropion 100 mg BID. PRN Miralax 17 g packet for constipation and lactulose oral solution 20 g 3 times daily prn.     This week we will monitor vital signs and lab values. We will keep patient safe from falls by continuing with hourly rounding, maintaining bed/chair alarms, and keeping call bell within reach. We will educate patient on importance of turning and repositioning to off load pressure to prevent skin break down. We will manage pain so that patient may perform optimally in therapy sessions. We will teach patient energy conservation and promote independence of ADLs.

## 2024-10-28 NOTE — ASSESSMENT & PLAN NOTE
Most recent hemoglobin stable at 9.4 g/dL  Maintain hemoglobin greater than 8 g/dL  Continue to monitor hemoglobin for now

## 2024-10-28 NOTE — PROGRESS NOTES
10/28/24 1030   Pain Assessment   Pain Assessment Tool 0-10   Pain Score No Pain   Restrictions/Precautions   Precautions Bed/chair alarms;Cognitive;Fall Risk;Supervision on toilet/commode;Other (comment)  (Dysarthria)   Comprehension   Comprehension (FIM) 4 - Understands basic info/conversation 75-90% of time   Expression   Expression (FIM) 3 - Expresses basic info/needs 50-74% of time   Social Interaction   Social Interaction (FIM) 5 - Interacts appropriately with others 90% of time   Problem Solving   Problem solving (FIM) 3 - Solves basic problmes 50-74% of time   Memory   Memory (FIM) 3 - Recognizes, recalls/performs 50-74%   Motor Speech Evaluation   Formalized Motor Speech Evaluation  Patient Name: Tommie Taylor    Today's Date: 10/28/2024     Problem List  Principal Problem:    CVA (cerebral vascular accident) ([facility])  Active Problems:    Bipolar I disorder, severe, current or most recent episode depressed, with psychotic features ([facility])    GERD (gastroesophageal reflux disease)    Insomnia    DAISY (obstructive sleep apnea)    Anemia, unspecified    Hyperlipidemia    Class 3 severe obesity due to excess calories with serious comorbidity and body mass index (BMI) of 40.0 to 44.9 in adult ([facility])    Tardive dyskinesia    Hypertension    DM2 (diabetes mellitus, type 2) ([facility])    Acute kidney injury superimposed on stage 4 chronic kidney disease ([facility])    Encephalopathy    Impaired mobility and activities of daily living    At risk for alteration in bowel function    Overgrown toenails      Past Medical History  Past Medical History:   Diagnosis Date    ADHD, adult residual type     Anxiety     Anxiety     Bipolar 1 disorder ([facility])     Cataract     Left eye    Chronic kidney disease     Colon polyp     CPAP (continuous positive airway pressure) dependence     Depression     Depression     Diabetes mellitus ([facility])     blood sugar 167 on admission    Equinus deformity of foot     GERD (gastroesophageal reflux disease)      Homicidal ideations     Hyperlipidemia     Hypertension     Microalbuminuria     Morbid obesity (HCC)     Neuropathy     Shortness of breath     Sleep apnea     CPAP at bedtime    Sleep apnea     Stroke (HCC)     Suicidal intent        Past Surgical History  Past Surgical History:   Procedure Laterality Date    CATARACT EXTRACTION      CATARACT EXTRACTION      COLONOSCOPY      HAND SURGERY Right     OTHER SURGICAL HISTORY      REPAIR OF SUPERFICIAL WOUND FACE    SC ESOPHAGOGASTRODUODENOSCOPY TRANSORAL DIAGNOSTIC N/A 06/14/2018    Procedure: ESOPHAGOGASTRODUODENOSCOPY (EGD);  Surgeon: Yinka Maier MD;  Location: [facility];  Service: Gastroenterology    SC ESOPHAGOGASTRODUODENOSCOPY TRANSORAL DIAGNOSTIC N/A 09/12/2018    Procedure: EGD AND COLONOSCOPY;  Surgeon: Yinka Maier MD;  Location: [facility];  Service: Gastroenterology         Impressions:  At this time pt is demonstrating speech intelligibility to be moderately impaired at the sentence and conversational level. Pt noting to elicit the following errors imprecise articulation, distorted speech sounds, decreased breath support for speech and fast rate of speech. Pt noted to have decrease production and sentence and conversation level over time in evaluation with pt getting more fatigued. Pt reports insight with decrease intelligibility. During conversational and sentence level productions and when increasing fatigue, pt would often omit 2-3 words in sentences with significant distorted speech sounds and words. Pt will benefit from SLP services at this time to improve overall speech intelligibility while on the acute rehab center.      HISTORY AND PHYSICAL:         Speech and Swallowing Mechanism Exam   Facial: symmetrical  Labial:  decrease protrusion   Lingual:  decreased elevation and depression  Velum: unable to visualize  Mandible: adequate ROM  Dentition: adequate  Respiratory Support: on RA    Respiration and Phonation  Maximum Phonation Time   (Normal 15-20 seconds for healthy older adult with standard deviation of 5.5-6):  11 seconds    Able to sustain phonation counting from 1 to 20 with frequent breaks    Vocal Quality:  breathy     Articulation:  Single Syllable Words: precise articulation 15/16 (deficits with 'mom' producing /nom/  Multisyllabic Words: 7/10 (deficits with 'television', 'tornado' and '[place]' with pt noting to not produce phonemes or distort in medial and initial position)  Phrase Level: impaired  Sentence Level: impaired; noted difficulty with longer 7-8+ word sentences with pt producing first 4-5 words then distorting and omitting the rest of the sentence)  Conversational Speech: impaired; distorted with longer, rapid speech    Diadochokinesis:   Puh puh puh (normal= 3.0 to 5.5 repetitions/second): 13 reps in 9 seconds  tuh tuh tuh (normal should exceed 25 reps in 10 seconds): 29 reps in 10 seconds  kuh kuh kuh (normal should exceed 25 reps in 10 seconds): 38 reps in 9 seconds  puh tuh kuh (pattycake or buttercup)- (normal should exceed 8 reps in 10 seconds): 7 reps in 9 seconds    Resonation: Normal nasality    S/S Oral apraxia:  None    S/S Verbal Apraxia:  None      INTELLIGIBILITY at the WORD LEVEL:  ~80% accuracy      INTELLIGIBILITY at the PHRASE LEVEL:  ~75% accuracy    INTELLIGIBILITY at the SENTENCE LEVEL:  ~50% accuracy    INTELLIGIBILITY in CONVERSATIONAL SPEECH:  ~25% accuracy      Conversation:  Decreased breath support for speech  Decreased coordination of respiration with speech attempts  Imprecise articulation  Rapid rate   Respiration/Phonation X   Max sustained phonation Inconsistently   Vocal Quality X   Dysarthria Yes   Moderate Dysarthria Impaired resp support and coordination with speech;Impaired phonation;Impaired articulation;Impaired rate control;Impaired rate and rhythm   Intelligibility Intelligibility reduced   Intelligibility Rating Sentence level intelligibility;Conversation level intelligibility    Speech/Language/Cognition Assessment   Treatment Assessment Pt was seen in therapy gym for skilled ST session targeting completion of cognitive testing as well as motor speech testing (See above). Pt easily transferred from recliner to  in room and brought down to therapy gym. Pt attended well to items but noted to have fleeting attention while in gym requiring frequent verbal cues for redirection.     Pt completed the CLQT+ on initial evaluation with a Composite Severity Rating score of 1.2 out of 4.0, correlating to overall severely impaired  cognitive linguistic impairments at time of evaluation and in comparison to age matched peers ranging from 18-70 y/o. Pt scored at or above criterion cut score for 1 out of 10 tasks completed. Pt's Cognitive Domain Scores are as follows:      Cognitive Domain: Score:  Severity Rating:   Attention   34 severely impaired    Memory 94 severely impaired        Executive Functions 8 severely impaired       Language 22 moderately impaired       Visuospatial Skills  22 severely impaired       Clock Drawing Screen    0 severely impaired    Overall Composite Severity Rating Score 1.2 out of 4.0 severely impaired       Pt was educated on results of assessment with review of overall results and performance on each cognitive domain, as well as specific focus on weaker areas, which will primarily be targeted during therapy sessions. Based on completion of formalized testing, SLP noted continued deficits with visual scanning due to baseline visual deficits impacting cognition as well. Pt was observed to noted upon eval to have the following deficits impacting overall functional tasks: STM, LTM, problem solving, working memory, reasoning, sequencing, attention and visuospatial skills. Pt was receptive to all information provided, along with recommendations for skilled SLP services during acute rehab stay to maximize overall cognitive linguistic skills.      SLP Therapy Minutes   SLP  Time In 1030   SLP Time Out 1130   SLP Total Time (minutes) 60   SLP Mode of treatment - Individual (minutes) 60   SLP Mode of treatment - Concurrent (minutes) 0   SLP Mode of treatment - Group (minutes) 0   SLP Mode of treatment - Co-treat (minutes) 0   SLP Mode of Treatment - Total time(minutes) 60 minutes   SLP Cumulative Minutes 95   Therapy Time missed   Time missed? No

## 2024-10-28 NOTE — ASSESSMENT & PLAN NOTE
Lethargy and encephalopathic symptoms noted during acute course.  B12 and head CT WNL. No subsequent episodes noted.   EEG on 10/22 consistent with moderate nonspecific cerebral dysfunction, no seizure activity.  Continue to monitor behavior and symptoms for signs of changes.   Most recent MoCA was 18/30 in 04/2024.  Per Psych - if continues to have delirium consider discontinuing Wellbutrin.  Concerns DAISY could also be contributing to encephalopathy per acute care.

## 2024-10-29 LAB
GLUCOSE SERPL-MCNC: 124 MG/DL (ref 65–140)
GLUCOSE SERPL-MCNC: 127 MG/DL (ref 65–140)
GLUCOSE SERPL-MCNC: 144 MG/DL (ref 65–140)
GLUCOSE SERPL-MCNC: 83 MG/DL (ref 65–140)
HEMOCCULT STL QL: NEGATIVE
HEMOCCULT STL QL: NORMAL
HEMOCCULT STL QL: NORMAL

## 2024-10-29 PROCEDURE — 97535 SELF CARE MNGMENT TRAINING: CPT

## 2024-10-29 PROCEDURE — 92507 TX SP LANG VOICE COMM INDIV: CPT

## 2024-10-29 PROCEDURE — 82948 REAGENT STRIP/BLOOD GLUCOSE: CPT

## 2024-10-29 PROCEDURE — 97110 THERAPEUTIC EXERCISES: CPT

## 2024-10-29 PROCEDURE — 94003 VENT MGMT INPAT SUBQ DAY: CPT

## 2024-10-29 PROCEDURE — 94660 CPAP INITIATION&MGMT: CPT

## 2024-10-29 PROCEDURE — 97112 NEUROMUSCULAR REEDUCATION: CPT

## 2024-10-29 PROCEDURE — 99232 SBSQ HOSP IP/OBS MODERATE 35: CPT | Performed by: INTERNAL MEDICINE

## 2024-10-29 PROCEDURE — 82272 OCCULT BLD FECES 1-3 TESTS: CPT | Performed by: NURSE PRACTITIONER

## 2024-10-29 PROCEDURE — 0HBRXZZ EXCISION OF TOE NAIL, EXTERNAL APPROACH: ICD-10-PCS | Performed by: STUDENT IN AN ORGANIZED HEALTH CARE EDUCATION/TRAINING PROGRAM

## 2024-10-29 PROCEDURE — 94760 N-INVAS EAR/PLS OXIMETRY 1: CPT

## 2024-10-29 PROCEDURE — 97129 THER IVNTJ 1ST 15 MIN: CPT

## 2024-10-29 PROCEDURE — 97530 THERAPEUTIC ACTIVITIES: CPT

## 2024-10-29 RX ORDER — LACTULOSE 10 G/15ML
20 SOLUTION ORAL 3 TIMES DAILY PRN
Status: DISCONTINUED | OUTPATIENT
Start: 2024-10-29 | End: 2024-11-18 | Stop reason: HOSPADM

## 2024-10-29 RX ADMIN — DOCUSATE SODIUM 100 MG: 100 CAPSULE, LIQUID FILLED ORAL at 17:15

## 2024-10-29 RX ADMIN — CLOPIDOGREL BISULFATE 75 MG: 75 TABLET ORAL at 08:53

## 2024-10-29 RX ADMIN — BUPROPION HYDROCHLORIDE 100 MG: 100 TABLET, FILM COATED ORAL at 08:53

## 2024-10-29 RX ADMIN — SENNOSIDES 17.2 MG: 8.6 TABLET, FILM COATED ORAL at 21:28

## 2024-10-29 RX ADMIN — HEPARIN SODIUM 7500 UNITS: 5000 INJECTION INTRAVENOUS; SUBCUTANEOUS at 05:49

## 2024-10-29 RX ADMIN — VALBENAZINE 40 MG: 40 CAPSULE ORAL at 21:30

## 2024-10-29 RX ADMIN — LORATADINE 5 MG: 10 TABLET ORAL at 08:53

## 2024-10-29 RX ADMIN — POLYETHYLENE GLYCOL 3350 17 G: 17 POWDER, FOR SOLUTION ORAL at 08:51

## 2024-10-29 RX ADMIN — ATORVASTATIN CALCIUM 40 MG: 40 TABLET, FILM COATED ORAL at 17:15

## 2024-10-29 RX ADMIN — HYDRALAZINE HYDROCHLORIDE 25 MG: 25 TABLET ORAL at 21:30

## 2024-10-29 RX ADMIN — FLUTICASONE PROPIONATE 1 SPRAY: 50 SPRAY, METERED NASAL at 08:52

## 2024-10-29 RX ADMIN — LABETALOL HYDROCHLORIDE 300 MG: 100 TABLET, FILM COATED ORAL at 21:29

## 2024-10-29 RX ADMIN — HYDRALAZINE HYDROCHLORIDE 25 MG: 25 TABLET ORAL at 14:16

## 2024-10-29 RX ADMIN — OLANZAPINE 10 MG: 2.5 TABLET, FILM COATED ORAL at 21:29

## 2024-10-29 RX ADMIN — ASPIRIN 81 MG: 81 TABLET, COATED ORAL at 08:53

## 2024-10-29 RX ADMIN — BUPROPION HYDROCHLORIDE 100 MG: 100 TABLET, FILM COATED ORAL at 17:15

## 2024-10-29 RX ADMIN — HYDRALAZINE HYDROCHLORIDE 25 MG: 25 TABLET ORAL at 05:49

## 2024-10-29 RX ADMIN — HEPARIN SODIUM 7500 UNITS: 5000 INJECTION INTRAVENOUS; SUBCUTANEOUS at 21:30

## 2024-10-29 RX ADMIN — LACTULOSE 20 G: 20 SOLUTION ORAL at 10:16

## 2024-10-29 RX ADMIN — HEPARIN SODIUM 7500 UNITS: 5000 INJECTION INTRAVENOUS; SUBCUTANEOUS at 14:16

## 2024-10-29 RX ADMIN — PANTOPRAZOLE SODIUM 40 MG: 40 TABLET, DELAYED RELEASE ORAL at 05:49

## 2024-10-29 RX ADMIN — NIFEDIPINE 90 MG: 30 TABLET, FILM COATED, EXTENDED RELEASE ORAL at 08:53

## 2024-10-29 RX ADMIN — DOCUSATE SODIUM 100 MG: 100 CAPSULE, LIQUID FILLED ORAL at 08:53

## 2024-10-29 RX ADMIN — LABETALOL HYDROCHLORIDE 300 MG: 100 TABLET, FILM COATED ORAL at 08:53

## 2024-10-29 NOTE — PCC OCCUPATIONAL THERAPY
10/29/24  59 y.o male admitted with chief complaint of dizziness and sudden onset right sided weakness.MRI brain showed acute left corona radiata infarct. Pt has a past medical history significant for: ADHD, anxiety, bipolar disorder, CKD4, CVA, tardive dyskinesia, depression, DM, GERD, HLD, HTN, neuropathy, sleep apnea with CPAP. PRECAUTIONS: fall risk, impulsive. Pt presents with the following impairments: balance, strength, endurance, cognition, impaired tone, motor control. Pt would benefit from skilled occupational therapy services with focus on ADL retraining, strength, balance, functional transfers, NMR and cognition to ensure safe discharge and participation in functional activities to increase independence. Unclear if family will be equipped to assist pt at home safely. Pt overall requiring mod-maxA for basic ADL, not yet ambulatory w/ ADL routine. Anticipate 17 to 21 day ELOS to meet supervision level goals.     11/5/24  Pt continues to demonstrate progress toward functional goals through training in compensatory strategies and one-handed estefania techniques, use of estim and NMR techniques to improve active muscle recruitment in LUE, ability to utilize the limb as a stabilizing assist during functional tasks, graded balance tasks in seated and standing positions. OT plans to focus on education with family in the next week regarding Pt's CLOF and potential need for assistance in the home following his stay at [facility] to determine if they are capable of providing that level of assistance or if Pt will require subacute rehab prior to returning home. Pt's CLOF is min A overall for BADLs and mobility, except for toileting which is mod A due to intermittent incontinence episodes.

## 2024-10-29 NOTE — PCC PHYSICAL THERAPY
58 y/o male pt who presented to [facility] on 10/14/2024 via EMS as prehospital stroke alert d/t fall following episode of dizziness and sudden onset of right sided weakness. He was administered TNK after BP was controlled. Following TNK, patient developed worsening dysarthria and headache. Repeat CTH was unremarkable. MRI of brain showed acute left corona radiata infarct. ECHO showed EF 70%, moderate concentric hypertrophy, grade 2 diastolic dysfunction, mild AVR. At baseline pt was I without AD lives with disabled spouse uses power chair and HD, 2SH with 1+1 ASHOK front door. Can have first floor setup if needed. Questionable support at DC.     10/29/24  Pt with hypertonic R UE, R hemiparesis limiting strength, balance, and ambulation. Slow progress with amb limited by Ftg and R LE weakness. Pt to greatly benefit from continued skilled PT intervention to maximize post stroke recovery, questionable family support at DC, will need to establish what support pt has to unsure safe DC home when physically appropriate. Reteam.     11/5/24  Pt showing progress with shorter distance ambulation requires CG level but still Min/ Mod assist with longer distances because blood pressure is dropping and pt drags R leg more.  Pt still needs Min A on steps which he does have steps at home.  Need to contact family to assess if they will be able to provide S at all times for pts safety and fall reduction.  Pt will need to continue stroke rehab to reach full supervision level.  Pts having orthostatic hypotension and much fatigue which has limited past 2 days of therapy with any progression.  Cont gait training/ steps/ ramp/ and transfers to reach supervision level.  Pt also needs to work on balance and reaction to LOB as pt is very high fall risk.  Pt also showing decrease memory which is impacting carryover.  Questionable family support and family expectations of pt at dc, can have first floor setup. Suggest reteam to  optimize post stroke functional outcomes.     11/12/24:  Tommie is progressing well towards goals but does continue to fatigue easily and requires frequent rest breaks for energy recovery between activities. He requires CGA for short distance gait, with gait deficits worsening as he fatigues; hence recommending household distances at this time for reduced risk for falls. He does demo intermittent signficiant posterior LOB during unsupported dynamic activities with limited/absent balance reactions noted and requiring max A to return to chair. He will continue to benefit from skilled PT interventions to address deficits, reduce risk for falls, and maximize functional independence. Plan is for family training to be completed on Friday and ME home Monday 11/18/24 with home health services.

## 2024-10-29 NOTE — ASSESSMENT & PLAN NOTE
Lab Results   Component Value Date    HGBA1C 8.6 (H) 10/16/2024       Recent Labs     10/28/24  1616 10/28/24  1636 10/28/24  1949 10/29/24  0530   POCGLU 142* 132 121 83       Blood Sugar Average: Last 72 hrs:  (P) [id number]    Continue insulin management with sliding scale.  Pt had been using ozempic for home glucose management but ran out and did not have any other glycemic control agents.

## 2024-10-29 NOTE — PROGRESS NOTES
Progress Note - Internal Medicine   Name: Tommie Taylor 59 y.o. male I MRN: [id number]  Unit/Bed#: -01 I Date of Admission: 10/26/2024   Date of Service: 10/29/2024 I Hospital Day: 3    Assessment & Plan  CVA (cerebral vascular accident) (HCC)  Initial ED presentation 10/14/2024 with RUE and RLE extremity weakness and R facial droop. CTH and CTA were initially negative for acute process, LKW was 1 hour prior to presentation.   , cardene drip initiated to stabilized BP, TNK administration after stabilization of HTN. Symptoms worsened s/p TNK but f/u CT again demonstrated no acute process.   24 hr post-TNK CT negative.   MRI completed, demonstrated indicated left corona radiata stroke.   DAPT initiated for 3 weeks with plan for transition to asa monotherapy.   Continue ASA 81mg and Plavix 75mg daily until 11/6 then transition to ASA 81mg daily monotherapy.  Cardiology recommends Zio patch after discharge.  Follow up with neurovascular after discharge.     PT/OT/ST per primary team.   Bipolar I disorder, severe, current or most recent episode depressed, with psychotic features ([facility])  Currently on bupropion 100mg BID and olanzapine 10mg at HS with valbenazine tosylate 40mg for tardive dyskinesia per home regimen. Follow up outpatient with psychiatry.   GERD (gastroesophageal reflux disease)  EGD with GI 10/14, directed to continue with Protonix. Tums available PRN for dyspepsia.   Insomnia  Educate and encourage on sleep hygiene. Continue Trazodone at HS for sleep.   DAISY (obstructive sleep apnea)  Continue use of CPAP with home settings.  Does not use CPAP at home - currently broken.  Recommend overnight oximetry prior to discharge.   Anemia, unspecified  Hemoglobin at admission 12.1.   10/21 HGB 10.4  10/28 HGB 9.8  Most recent iron panel on 10/15: Iron Sat 38%, TIBC 204, Iron 77, and Ferritin 111.  No need for iron supplementation at this time.  FOBT collected today and pending.   Continue to trend  routine CBC.  Hyperlipidemia  Most recent lipid panel 10/16/24  Total Cholesterol 154  Trig 120  HDL 40  LDL 90   Continue statin therapy atorvastatin 40mg QPM increase from home 20mg daily.  Class 3 severe obesity due to excess calories with serious comorbidity and body mass index (BMI) of 40.0 to 44.9 in adult ([facility])  BMI 42.74 on admission. Encourage lifestyle modifications and provide support for nutritional teaching. Consult placed to nutrition services during acute course.   Tardive dyskinesia  Continue on home treatment Valbenazine Tosylate 40mg HS.   Hypertension  Presented with hypertensive emergency on initial hospitalization.   Concerns about medication non-compliance.  Home regimen: labetalol 100mg BID and amlodipine 10mg daily.  Target SBP 140s, currently on labetalol 300mg BID and Nifedipine 90mg daily with hydralazine 25mg Q8 hours. Torsemide D/C r/t ROSINA on 10/23.   Nephrology following.   DM2 (diabetes mellitus, type 2) ([facility])  Lab Results   Component Value Date    HGBA1C 8.6 (H) 10/16/2024       Recent Labs     10/28/24  1616 10/28/24  1636 10/28/24  1949 10/29/24  0530   POCGLU 142* 132 121 83       Blood Sugar Average: Last 72 hrs:  (P) 121.7971935399601639    Continue insulin management with sliding scale.  Pt had been using ozempic for home glucose management but ran out and did not have any other glycemic control agents.     Acute kidney injury superimposed on stage 4 chronic kidney disease ([facility])  Lab Results   Component Value Date    EGFR 17 10/28/2024    EGFR 15 10/26/2024    EGFR 14 10/25/2024    CREATININE 3.55 (H) 10/28/2024    CREATININE 4.04 (H) 10/26/2024    CREATININE 4.21 (H) 10/25/2024     Baseline creatinine 2.5-2.9.   10/28/24 Creatinine 3.55 from 4.04, improving.   Avoid nephrotoxins.  Encourage hydration.  Continue to trend CMPs M/W/F.  Renal ultrasound demonstrates no hydro but mild bladder wall thickening.   Nephrology following.   Follows with Dr. No (Nephrology) as  outpatient.  Encephalopathy  Lethargy and encephalopathic symptoms noted during acute course.  B12 and head CT WNL. No subsequent episodes noted.   EEG on 10/22 consistent with moderate nonspecific cerebral dysfunction, no seizure activity.  Continue to monitor behavior and symptoms for signs of changes.   Most recent MoCA was 1830 in 2024.  Per Psych - if continues to have delirium consider discontinuing Wellbutrin.  Concerns DAISY could also be contributing to encephalopathy per acute care.  Impaired mobility and activities of daily living  Continue PT/OT/ST treatment.   Overgrown toenails  Podiatry consult.   Vitamin D deficiency  Vitamin D level <7 on 10/7.  Continue home vitamin D 50,000u weekly.  Continue to follow-up with Nephrology as outpatient.    VTE Pharmacologic Prophylaxis:   Pharmacologic: Heparin  Mechanical VTE Prophylaxis in Place: Yes SCDs in bed     Current Length of Stay: 3 day(s)    Current Patient Status: Inpatient Rehab     Discharge Plan: As per treatment team     Code Status: Level 1 - Full Code    Subjective:   Pt received after lunch in recliner. Pt reports large BM just prior to visit, states feeling better but probably has more to go. Pt states no pain today, allergy symptoms reported yesterday have improved with Claritin and flonase today. Pt states he slept well and is tired this afternoon and will take a nap. Pt fell asleep during auscultation of heart and lungs with audible snoring. Easily arousable, but pt returned to sleep with provision of blanket and door closed slightly.     Objective:     Vitals:   Temp (24hrs), Av.4 °F (35.8 °C), Min:95.9 °F (35.5 °C), Max:97.2 °F (36.2 °C)    Temp:  [95.9 °F (35.5 °C)-97.2 °F (36.2 °C)] 96.2 °F (35.7 °C)  HR:  [59-71] 71  Resp:  [18] 18  BP: (118-134)/(65-72) 134/72  SpO2:  [98 %-99 %] 98 %  Body mass index is 42.74 kg/m².     Review of Systems   Constitutional:  Positive for fatigue. Negative for chills and fever.   HENT:  Negative  for congestion, ear pain, rhinorrhea and sore throat.    Eyes:  Negative for pain and visual disturbance.   Respiratory:  Negative for cough, chest tightness, shortness of breath and wheezing.    Cardiovascular:  Negative for chest pain, palpitations and leg swelling.   Gastrointestinal:  Negative for abdominal pain, constipation, diarrhea, nausea and vomiting.        Large BM prior to exam   Genitourinary:  Negative for dysuria, frequency, hematuria and urgency.   Musculoskeletal:  Negative for arthralgias, back pain, myalgias and neck pain.   Skin:  Negative for color change, pallor, rash and wound.   Allergic/Immunologic: Positive for environmental allergies.   Neurological:  Positive for facial asymmetry (Mild R Facial Droop) and weakness (RUE, RLE). Negative for dizziness, seizures, syncope and headaches.   Psychiatric/Behavioral:  Negative for confusion and hallucinations. The patient is not nervous/anxious.    All other systems reviewed and are negative.       Input and Output Summary (last 24 hours):       Intake/Output Summary (Last 24 hours) at 10/29/2024 0901  Last data filed at 10/28/2024 2000  Gross per 24 hour   Intake 660 ml   Output --   Net 660 ml       Physical Exam:     Physical Exam  Constitutional:       Appearance: He is obese. He is not ill-appearing or diaphoretic.   HENT:      Head: Normocephalic and atraumatic.      Nose: Nose normal. No rhinorrhea.      Mouth/Throat:      Mouth: Mucous membranes are moist.   Cardiovascular:      Rate and Rhythm: Normal rate and regular rhythm.      Pulses: Normal pulses.      Heart sounds: Normal heart sounds.   Pulmonary:      Effort: Pulmonary effort is normal.      Breath sounds: Normal breath sounds and air entry. No wheezing or rhonchi.      Comments: Occasional cough while eating.   Abdominal:      General: Bowel sounds are normal.      Palpations: Abdomen is soft.      Tenderness: There is no abdominal tenderness. There is no guarding.    Musculoskeletal:      Cervical back: Normal range of motion.      Right lower le+ Edema present.      Left lower le+ Edema present.      Comments: Right upper arm weakness, tightness to muscles   Skin:     General: Skin is warm and dry.      Capillary Refill: Capillary refill takes less than 2 seconds.      Coloration: Skin is not pale.      Findings: No erythema.   Neurological:      Mental Status: He is alert. Mental status is at baseline.      Motor: Weakness (RUE, RLE, mild R facial droop) present.      Coordination: Coordination normal.   Psychiatric:         Mood and Affect: Mood normal.         Behavior: Behavior normal.         Additional Data:     Labs:    Results from last 7 days   Lab Units 10/28/24  1022   WBC Thousand/uL 6.45   HEMOGLOBIN g/dL 9.8*   HEMATOCRIT % 30.0*   PLATELETS Thousands/uL 190   SEGS PCT % 60   LYMPHO PCT % 24   MONO PCT % 11   EOS PCT % 3     Results from last 7 days   Lab Units 10/28/24  0501   SODIUM mmol/L 139   POTASSIUM mmol/L 3.8   CHLORIDE mmol/L 108   CO2 mmol/L 24   BUN mg/dL 32*   CREATININE mg/dL 3.55*   ANION GAP mmol/L 7   CALCIUM mg/dL 8.4   GLUCOSE RANDOM mg/dL 97         Results from last 7 days   Lab Units 10/29/24  0530 10/28/24  1949 10/28/24  1636 10/28/24  1616 10/28/24  1124 10/28/24  0626 10/27/24  2107 10/27/24  1623 10/27/24  1126 10/27/24  0614 10/26/24  2117 10/26/24  1743   POC GLUCOSE mg/dl 83 121 132 142* 168* 90 107 108 146* 99 118 145*               Labs reviewed    Imaging:    Imaging reviewed    Recent Cultures (last 7 days):           Last 24 Hours Medication List:   Current Facility-Administered Medications   Medication Dose Route Frequency Provider Last Rate    acetaminophen  650 mg Oral Q6H PRN Alex S George, DO      aspirin  81 mg Oral Daily Alex S Ariel, DO      atorvastatin  40 mg Oral QPM Alex S Ariel, DO      buPROPion  100 mg Oral BID Alex S Ariel, DO      calcium carbonate  1,000 mg Oral BID PRN Alex S George, DO       clopidogrel  75 mg Oral Daily Alex S George, DO      docusate sodium  100 mg Oral BID Cathy Miranda MD      fluticasone  1 spray Each Nare Daily SAMANTHA Kern      heparin (porcine)  7,500 Units Subcutaneous Q8H MIGEL Alex S George, DO      hydrALAZINE  25 mg Oral Q8H MIGEL Alex S George, DO      insulin lispro  1-5 Units Subcutaneous HS Alex S George, DO      insulin lispro  1-6 Units Subcutaneous TID AC Alex S George, DO      labetalol  300 mg Oral BID Alex S George, DO      loratadine  5 mg Oral Daily SAMANTHA Kern      NIFEdipine  90 mg Oral Daily Alex S George, DO      OLANZapine  10 mg Oral HS Alex S George, DO      ondansetron  4 mg Oral Q6H PRN SAMANTHA Kern      pantoprazole  40 mg Oral Early Morning Alex S George, DO      polyethylene glycol  17 g Oral Daily Cathy Miranda MD      senna  2 tablet Oral HS Cathy Miranda MD      traZODone  50 mg Oral HS PRN Alex S George, DO      Valbenazine Tosylate  40 mg Oral HS Alex S George, DO          M*Modal software was used to dictate this note.  It may contain errors with dictating incorrect words or incorrect spelling. Please contact the provider directly with any questions.

## 2024-10-29 NOTE — ASSESSMENT & PLAN NOTE
Lab Results   Component Value Date    HGBA1C 8.6 (H) 10/16/2024       Recent Labs     10/28/24  1636 10/28/24  1949 10/29/24  0530 10/29/24  1118   POCGLU 132 121 83 127     Most recent hemoglobin A1c of 8.6 and technically uncontrolled although blood sugars in the hospital have been well-controlled  Currently on semaglutide as an outpatient but was on hold due to ROSINA in the hospital  Continue sliding scale and 4 times daily Accu-Cheks and as well as a diabetic diet

## 2024-10-29 NOTE — ASSESSMENT & PLAN NOTE
Constipated no bm for several days; lactulose PRN x3 or until BM   Start colase, senna daily and miralax prn  Titrate as needed

## 2024-10-29 NOTE — PROGRESS NOTES
10/29/24 1100   Pain Assessment   Pain Assessment Tool 0-10   Pain Score No Pain   Restrictions/Precautions   Precautions Bed/chair alarms;Cognitive;Fall Risk;Supervision on toilet/commode   Comprehension   Comprehension (FIM) 4 - Understands basic info/conversation 75-90% of time   Expression   Expression (FIM) 2 - Uses only simple expressions or gestures (waves, hello)   Social Interaction   Social Interaction (FIM) 4 - Interacts 75-89% of time   Problem Solving   Problem solving (FIM) 3 - Solves basic problmes 50-74% of time   Memory   Memory (FIM) 3 - Recognizes, recalls/performs 50-74%   Motor Speech Evaluation   Intelligibility Intelligibility reduced   Intelligibility Rating Conversation level intelligibility;1%-24%;25%-49%;50%-74%   Speech/Language/Cognition Assessmetn   Treatment Assessment Pt was asleep upon arrival to room and was difficult initially to arouse from sleep. It was noted that pt repositioned self in recliner, sitting more upright to where this did improve FELIPE and engagement in session today, but pt still exhibiting moments of fatigue. SLP using this overall fatigue to initiate basic stroke education w/ pt, but it was suspected that decreased comprehension occurring given education due to ongoing fatigue as SLP was discussing. Switched to engaging in rapport building w/ pt targeting LTM biographical recall. Pt was fairly accurate in his ability to provide the information which was addressed/assessed in initial cognitive assessment. Pt recalling full address, , age, in addition to s/o, Marie. Pt also reported completing I ADL tasks such as cooking, cleaning, laundry but s/o completes finances. Attempted to have pt discuss medications, but due to fluctuating FELIPE, speech intelligibility becomes highly impacted to express self. Pt did confirm being from [place] originally. Pt was able to recall reasoning for hospitalization, current city, month and year.     In regard to speech  intelligibility, it was SIGNIFICANTLY reduced when fatigued to where pt was nearly unintelligible. However, as pt was more awake and engaged, pt's speech clarity DOES improve BUT still is noted to be moderately dysarthric. Pt was observed to have a R facial/labial droop and suspect decreased ROM/strength w/ tongue. Minimal movements were observed when expressing self given articulators in connected speech. In conversational speech today, pt's speech intelligibly ranged from 20-50% today. SLP reviewed Speech Strategies w/ pt today and visual reminder left at bedside. Also SLP provided pt w/ OME's targeting face, lips, tongue which will be reviewed at length in next sessions. Lastly, SLP providing pt w/ incentive spirometer, to where pt did require mild verbal prompting w/ use to where pt average ~750mL. Target was placed for pt at 1000mL. At this time, pt will continue to benefit from ongoing skilled SLP services targeting functional independence of cognitive linguistic skills in hopes for decreasing burden of care over time.   SLP Therapy Minutes   SLP Time In 1100   SLP Time Out 1130   SLP Total Time (minutes) 30   SLP Mode of treatment - Individual (minutes) 30   SLP Mode of treatment - Concurrent (minutes) 0   SLP Mode of treatment - Group (minutes) 0   SLP Mode of treatment - Co-treat (minutes) 0   SLP Mode of Treatment - Total time(minutes) 30 minutes   SLP Cumulative Minutes 125   Therapy Time missed   Time missed? No

## 2024-10-29 NOTE — QUICK NOTE
Family visited patient in room during OT session. Patients wife. Stepdaughter with her baby, and Wife's father present. Stepdaughter notes that she will be primary care giver for patient once he goes home. She works nights but is available during the day. She however lives 20mins away from the patient. His wife will not be physically able to aid the patient, nor will her dad be able to do so. Patients wife also noted that she has not been to dialysis since the patient has been hospitalized. She reports that she has been unable to go because she is unable to lift her scooter to and from the car. The patient usually goes with her and helps with the scooter. She states that the dialysis center does not have wheelchairs. I encouraged wife to contact dialysis center, see if family can help with transportation, or go to the emergency room. She was agreeable to call the dialysis center tomorrow to see what assistance they can provide.We discussed the risks associated with missing dialysis and patient is agreeable to going to the ED should she have any symptoms.

## 2024-10-29 NOTE — CONSULTS
Consult Note- Podiatry   [facility]  Tommie Taylor 59 y.o. male MRN: [id number]  Unit/Bed#: [facility] 267-01 Encounter: [id number]    Assessment & Plan     Assessment:  1. Onychomycosis x 10  2. DM  3. PVD  4. Pain toes b/l     Plan:  - -pt eval and managed    - Number and complexity of problems addressed:  1 undiagnosed new problem with uncertain prognosis   as shown    - Amount/complexity of data reviewed and analyzed:     Category 1: prior patient notes were analyzed today before evaluating and managing patient. All PMH were discussed with pt today.     - Risk of complications: moderate risk of morbidity from additional testing or treatment involved with this patient, which includes but not limited to:    - discussed anatomy, condition, treatment plan and options. They were instructed on proper foot care. The patient was seen today for greater than total of  45-59 minutes   . This is total time spent today involving both face-to-face time and non face-to-face time. This time spent includes  reviewing their past medical history  , performing a medically appropriate examination and evaluation of the patient, counseling and educating the patient,  documenting all findings in EMR, and independently interpreting results and communicating results with  patient and discussing their condition and treatment options, risks, and potential complications. I have discussed the findings of this examination with the patient. The discussion included a complete verbal explanation of the examination results, diagnosis and planned treatment(s). A schedule for future care needs was explained. The patient has verbalized the understanding of these instructions at this time. If any questions should arise after returning home I have encouraged the patient to feel free to call the office.    - d/w pt that discomfort is secondary to toe nail thickening  - Hallucal mycotic nails x2 debrided decreasing thickness by 1 mm.  Mycotic toe nails 2-5 b/l were trimmed, decreasing length, without incidence utilizing a sharp nail nipper.    - All questions and concerns addressed.    - Podiatry signing off, thank you for the consult.     History of Present Illness     HPI:  Tommie Taylor is a 59 y.o. male who presents with painful, elongated toenails. They have difficulty applying their socks and shoes due to the elongation of the nails. The pressure within their shoe gear is painful and they have been unable to cut their nails adequately. Patient states pain is 1/10 in shoe gear. Pain with pressure. Requires at risk foot care.     Inpatient consult to Podiatry  Consult performed by: Na Boggs DPM  Consult ordered by: Cathy Miranda MD        Review of Systems   Constitutional: Negative.    HENT: Negative.    Eyes: Negative.    Respiratory: Negative.    Cardiovascular: Negative.    Gastrointestinal: Negative.    Musculoskeletal: Negative   Skin: elongated thickened toenails,   Neurological: Negative.        Historical Information   Past Medical History:   Diagnosis Date    ADHD, adult residual type     Anxiety     Anxiety     Bipolar 1 disorder (HCC)     Cataract     Left eye    Chronic kidney disease     Colon polyp     CPAP (continuous positive airway pressure) dependence     Depression     Depression     Diabetes mellitus (HCC)     blood sugar 167 on admission    Equinus deformity of foot     GERD (gastroesophageal reflux disease)     Homicidal ideations     Hyperlipidemia     Hypertension     Microalbuminuria     Morbid obesity (HCC)     Neuropathy     Shortness of breath     Sleep apnea     CPAP at bedtime    Sleep apnea     Stroke (HCC)     Suicidal intent      Past Surgical History:   Procedure Laterality Date    CATARACT EXTRACTION      CATARACT EXTRACTION      COLONOSCOPY      HAND SURGERY Right     OTHER SURGICAL HISTORY      REPAIR OF SUPERFICIAL WOUND FACE    NE ESOPHAGOGASTRODUODENOSCOPY TRANSORAL DIAGNOSTIC  "N/A 2018    Procedure: ESOPHAGOGASTRODUODENOSCOPY (EGD);  Surgeon: Yinka Maier MD;  Location: [facility];  Service: Gastroenterology    [place] ESOPHAGOGASTRODUODENOSCOPY TRANSORAL DIAGNOSTIC N/A 2018    Procedure: EGD AND COLONOSCOPY;  Surgeon: Yinka Maier MD;  Location: [facility];  Service: Gastroenterology     Social History   Social History     Substance and Sexual Activity   Alcohol Use Not Currently    Comment: rarely     Social History     Substance and Sexual Activity   Drug Use Not Currently    Comment: quit 20 years ago     Social History     Tobacco Use   Smoking Status Former    Current packs/day: 0.00    Types: Cigarettes    Quit date:     Years since quittin.8   Smokeless Tobacco Current    Types: Chew   Tobacco Comments    pt \"Quit after approx over 25 years ago\"     Family History:   Family History   Problem Relation Age of Onset    Diabetes Mother     Alcohol abuse Father     Diabetes Father     Hypertension Father     Lung cancer Father     Stroke Father     Cancer Father         lung    Alcohol abuse Sister     Depression Sister     Lymphoma Sister     Alcohol abuse Family     Alcohol abuse Sister     Alcohol abuse Sister     Cancer Sister     Heart disease Neg Hx     Thyroid disease Neg Hx        Meds/Allergies     Medications Prior to Admission:     acetaminophen (TYLENOL) 325 mg tablet    ammonium lactate (LAC-HYDRIN) 12 % cream    aspirin (ECOTRIN LOW STRENGTH) 81 mg EC tablet    atorvastatin (LIPITOR) 20 mg tablet    clopidogrel (PLAVIX) 75 mg tablet    ergocalciferol (ERGOCALCIFEROL) 1.25 MG (40824 UT) capsule    Heparin Sodium, Porcine, (heparin, porcine,) 5,000 units/mL    hydrALAZINE (APRESOLINE) 25 mg tablet    insulin lispro (HumALOG/ADMELOG) 100 units/mL injection    Insulin Pen Needle 31G X 8 MM MISC    labetalol (NORMODYNE) 100 mg tablet    Lancets (FREESTYLE) lancets    NIFEdipine (PROCARDIA XL) 30 mg 24 hr tablet    OLANZapine (ZyPREXA) 10 mg tablet    " "pantoprazole (PROTONIX) 40 mg tablet    traZODone (DESYREL) 50 mg tablet    Valbenazine Tosylate (Ingrezza) 40 MG CAPS    Blood Glucose Monitoring Suppl (FREESTYLE LITE) ANTONIA    Blood Glucose Monitoring Suppl (FreeStyle Lite) w/Device KIT    buPROPion (WELLBUTRIN) 100 mg tablet    calcium carbonate (TUMS) 500 mg chewable tablet    Continuous Blood Gluc  (FreeStyle [place] 14 Day [place]) ANTONIA    Continuous Blood Gluc Sensor (FreeStyle Clarence 14 Day Sensor) MISC    FREESTYLE LITE test strip    glucose monitoring kit (FREESTYLE) monitoring kit    INSULIN SYRINGE .5CC/29G 29G X 1/2\" 0.5 ML MISC    polyethylene glycol (MIRALAX) 17 g packet    vitamin B-12 (CYANOCOBALAMIN) 500 MCG TABS  Allergies   Allergen Reactions    Pollen Extract Nasal Congestion    Tetanus Toxoid Swelling       Objective   First Vitals:   Blood Pressure: 130/74 (10/26/24 1700)  Pulse: 62 (10/26/24 1700)  Temperature: 98.1 °F (36.7 °C) (10/26/24 1700)  Temp Source: Tympanic (10/26/24 1700)  Respirations: 20 (10/26/24 1700)  Height: 5' 6\" (167.6 cm) (10/26/24 1700)  Weight - Scale: 120 kg (264 lb 12.8 oz) (10/26/24 1700)  SpO2: 98 % (10/26/24 1700)    Current Vitals:   Blood Pressure: 134/72 (10/29/24 0800)  Pulse: 71 (10/29/24 0800)  Temperature: (!) 96.2 °F (35.7 °C) (10/29/24 0548)  Temp Source: Tympanic (10/29/24 0548)  Respirations: 18 (10/29/24 0800)  Height: 5' 6\" (167.6 cm) (10/26/24 1700)  Weight - Scale: 120 kg (264 lb 12.8 oz) (10/26/24 1700)  SpO2: 98 % (10/29/24 0548)    /72 (BP Location: Left arm)   Pulse 71   Temp (!) 96.2 °F (35.7 °C) (Tympanic)   Resp 18   Ht 5' 6\" (1.676 m)   Wt 120 kg (264 lb 12.8 oz)   SpO2 98%   BMI 42.74 kg/m²     General Appearance:    Alert, cooperative, no distress            Extremities:   MMT is 5/5 to all compartments of the LE, +1/4 edema B/L, Digital ROM is intact,    Pulses:   R DP is +1/4, R PT is +1/4, L DP is +1/4, L PT is +1/4, CFT< 3sec to all digits. Thin/shiny skin noted to the " B/L LE, pigmentary changes to B/L LE. Absent digital hair growth b/l.    Skin:   Nail thickening b/l. Nails are yellow, discolored, thickened, elongated, with notable subungual debris and > 2 mm thickness noted to toenails 1-5 B/L. No open Lesions.        Neurologic:   Normal strength, sensation and reflexes       Throughout. Gross sensation is intact. Protective sensation is diminished                Lab Results:   Admission on 10/26/2024   Component Date Value    POC Glucose 10/26/2024 145 (H)     POC Glucose 10/26/2024 118     POC Glucose 10/27/2024 99     POC Glucose 10/27/2024 146 (H)     POC Glucose 10/27/2024 108     POC Glucose 10/27/2024 107     Sodium 10/28/2024 139     Potassium 10/28/2024 3.8     Chloride 10/28/2024 108     CO2 10/28/2024 24     ANION GAP 10/28/2024 7     BUN 10/28/2024 32 (H)     Creatinine 10/28/2024 3.55 (H)     Glucose 10/28/2024 97     Calcium 10/28/2024 8.4     eGFR 10/28/2024 17     Phosphorus 10/28/2024 3.6     POC Glucose 10/28/2024 90     WBC 10/28/2024 6.45     RBC 10/28/2024 3.66 (L)     Hemoglobin 10/28/2024 9.8 (L)     Hematocrit 10/28/2024 30.0 (L)     MCV 10/28/2024 82     MCH 10/28/2024 26.8     MCHC 10/28/2024 32.7     RDW 10/28/2024 14.5     MPV 10/28/2024 10.4     Platelets 10/28/2024 190     nRBC 10/28/2024 0     Segmented % 10/28/2024 60     Immature Grans % 10/28/2024 1     Lymphocytes % 10/28/2024 24     Monocytes % 10/28/2024 11     Eosinophils Relative 10/28/2024 3     Basophils Relative 10/28/2024 1     Absolute Neutrophils 10/28/2024 3.94     Absolute Immature Grans 10/28/2024 0.05     Absolute Lymphocytes 10/28/2024 1.52     Absolute Monocytes 10/28/2024 0.71     Eosinophils Absolute 10/28/2024 0.18     Basophils Absolute 10/28/2024 0.05     POC Glucose 10/28/2024 168 (H)     POC Glucose 10/28/2024 142 (H)     POC Glucose 10/28/2024 132     POC Glucose 10/28/2024 121     POC Glucose 10/29/2024 83     POC Glucose 10/29/2024 127      Imaging: I have personally  reviewed pertinent films in PACS  EKG, Pathology, and Other Studies: I have personally reviewed pertinent reports.      Code Status: Level 1 - Full Code  Advance Directive and Living Will:      Power of :

## 2024-10-29 NOTE — PROGRESS NOTES
Progress Note - PMR   Name: Tommie Taylor 59 y.o. male I MRN: [id number]  Unit/Bed#: [facility] 267-01 I Date of Admission: 10/26/2024   Date of Service: 10/29/2024 I Hospital Day: 3     Assessment & Plan  CVA (cerebral vascular accident) (HCC)  Patient with uncontrolled hypertension and diabetes presents with right upper and right lower extremity weakness as well as facial droop  A CT of the head as well as a CTA of the head and neck were completed with negative for acute abnormalities for an acute process  TNK had been administered following the correction of his hypertension after being placed on a Cardene drip  After ministration of the TNK developed worsening dysarthria as well as headache and a repeat CT was still negative.  The 24 post TNK CT was also negative  Neurology followed the patient and MRI was completed and was significant for left corona radiata stroke  Placed on dual antiplatelet therapy for 3 weeks with plans for monotherapy with aspirin and statin indefinitely for secondary stroke prophylaxis  Recommendation for a Zio patch as an outpatient  Follow-up with neurovascular attending in 6 to 8 weeks  Physical, occupational and speech therapy while on the acute rehabilitation unit  Bipolar I disorder, severe, current or most recent episode depressed, with psychotic features ([facility])  History of chronic bipolar 1 disorder and follows with outpatient psychiatry and also was seen inpatient  Mood has been stable and is maintained on Zyprexa, bupropion and trazodone as well as Ingrezza for tardive dyskinesia  Follow-up with psychiatry as an outpatient and consider virtual consultation if indicated for any changes while on ARC  GERD (gastroesophageal reflux disease)  Continue Protonix as well as as needed Tums  Insomnia  Continue trazodone and consider sleep logs  DAISY (obstructive sleep apnea)  Continue CPAP with home settings  Ordered and compliant per records  Anemia, unspecified  Hemoglobin most recently of  10.4 which is up from 9.3 but has been hovering in the 10-11 range for most of admission  Continue to monitor with biweekly CBC or sooner if clinically indicated  Hyperlipidemia  Continue Lipitor 40 mg every evening  Class 3 severe obesity due to excess calories with serious comorbidity and body mass index (BMI) of 40.0 to 44.9 in adult ([facility])  Continue with exercise and promote lifestyle modification, weight loss counseling and management of medical conditions to optimize status  Tardive dyskinesia  Continue Ingrezza 40 mg at bedtime  Hypertension  Presented to the hospital significantly elevated blood pressure with systolic ranging from 197-231  Was placed on a Cardizem drip initially for hypertensive emergency  Was previously on Norvasc 10 mg daily as well as labetalol 100 mg twice daily  Neurology was consulted for BP management and Norvasc was discontinued and started nifedipine and increased as well as a increase to labetalol to 300 mg twice daily  Had been on Demadex 20 mg daily but was on hold due to the increasing creatinine  Goals for normotension  Mgmt per IM  Follow-up with nephrology as an outpatient  DM2 (diabetes mellitus, type 2) ([facility])  Lab Results   Component Value Date    HGBA1C 8.6 (H) 10/16/2024       Recent Labs     10/28/24  1636 10/28/24  1949 10/29/24  0530 10/29/24  1118   POCGLU 132 121 83 127     Most recent hemoglobin A1c of 8.6 and technically uncontrolled although blood sugars in the hospital have been well-controlled  Currently on semaglutide as an outpatient but was on hold due to ROSINA in the hospital  Continue sliding scale and 4 times daily Accu-Cheks and as well as a diabetic diet    Acute kidney injury superimposed on stage 4 chronic kidney disease ([facility])  Lab Results   Component Value Date    EGFR 17 10/28/2024    EGFR 15 10/26/2024    EGFR 14 10/25/2024    CREATININE 3.55 (H) 10/28/2024    CREATININE 4.04 (H) 10/26/2024    CREATININE 4.21 (H) 10/25/2024     Recent creatinine of  3.55 10/28 Prior baseline around 2.9-3.0  Etiology felt to be related potentially to ATN in the setting of uncontrolled hypertension and complicated by contrast associated nephropathy  Nephrology was consulted and followed and a UA showed microhematuria and proteinuria.  An ultrasound of the kidney/bladder showed wall thickening but no hydronephrosis  Bladder scans negative for retention  Monitor BMP biweekly or sooner if clinically indicated  Demadex has been on hold and had periodically required IV fluids  Follow with nephrology as an outpatient or sooner on the ARC if any acute changes  Encephalopathy  Had lethargy on exam back on 10/17 in acute care with improvements however more recently had issues with confusion and decreased consciousness in the mornings that improves throughout the day with unclear etiology  Prior history of cognitive impairments with last MoCA score of 18  CT of the head was stable, B12 level (446) wnl  Was evaluated by psychiatry with no changes in medications recommended  EEG showed no epileptiform activity just moderate nonspecific dysfunction  Will need to monitor especially with change in environment now on the ARC for any changes  Impaired mobility and activities of daily living  Patient was evaluated by the rehabilitation team MD and an appropriate candidate for acute inpatient rehabilitation program at this time.  The patient will tolerate 3 hours/day 5 to 7 days/week of intensive physical, occupational and speech therapy in order to obtain goals for community discharge  Due to the patient's functional Compared to their baseline level of function in addition to their ongoing medical needs, the patient would benefit from daily supervision from a rehabilitation physician as well as rehabilitation nursing to implement and adjust the medical as well as functional plan of care in order to meet the patient's goals.    At risk for alteration in bowel function  Constipated no bm for several  days; lactulose PRN x3 or until BM   Start colase, senna daily and miralax prn  Titrate as needed   Overgrown toenails  Cs podiatry   Vitamin D deficiency  <7 10/7  Mgmt per IM/nephrology     History of Present Illness   Tommie Taylor is a 59 y.o. male with history of bipolar disorder, CKD stage IV, type 2 diabetes, hypertension, tardive dyskinesia, sleep apnea, GERD who presented to the [facility] on 10/14/2024 for right-sided weakness and facial droop.  MRI revealed a left corona radiata infarct and neurology was consulted and was administered TNK.  He initially required a Cardene drip for his hypertensive emergency and was eventually placed on dual antiplatelet therapy with plan to continue aspirin and Plavix until that date and continue with aspirin as monotherapy as well as atorvastatin for secondary stroke prophylaxis.  He was recommended for Zio patch placement as an outpatient.  His hospital course was complicated by acute kidney injury on top of his CKD thought to be related to ATN in the setting of uncontrolled blood pressure as well as contrast associated nephropathy.  Nephrology did follow during his course and had some levels of improvement however has not returned back to his baseline level of creatinine.  Additionally there was some levels of encephalopathy which are stable at this time. The patient was evaluated by the Rehabilitation team and deemed an appropriate candidate for comprehensive inpatient rehabilitation and admitted to the [facility] on 10/26/2024  5:13 PM     Rehab Diagnosis: Impairment of mobility, safety, Activities of Daily Living (ADLs), and cognitive/communication skills due to Stroke:  01.2  Right Body Involvement (Left Brain)  Etiologic Dx: Left Corona Radiata Infarct  Date of Onset: 10/14/2024   Date of surgery: N/A  Chief Complaint: f/u stroke    Interval: Patient seen and examined in room. No events overnight.  Reports overall feeling very constipated and  would really like something stronger to help him go. Last BM prior to admission per patient. Sleeping well at night. Denies any f/c/n/v, CP, SOB, abdominal pain.    Objective   Functional Update:  ADL: mod   Transfer: min-mod  Gailt: mod HW     Temp:  [95.9 °F (35.5 °C)-97.2 °F (36.2 °C)] 96.2 °F (35.7 °C)  HR:  [59-71] 71  Resp:  [18] 18  BP: (118-134)/(65-72) 134/72  SpO2:  [98 %-99 %] 98 %    Physical Exam    Gen: No acute distress, obese   HEENT: Moist mucus membranes, Normocephalic/Atraumatic  Cardiovascular: Regular rate, rhythm,  Heme/Extr: bilateral LE edema  Pulmonary: Non-labored breathing. Lungs CTAB  : No amezquita  GI:  distended, BS+ non tender   MSK: ROM is WFL in all extremities.   Integumentary: Skin is warm, dry. .  Neuro: RUE weakness, RLE weakness, R facial droop, dysarthric   Psych: Normal mood and affect.       Scheduled Meds:  Current Facility-Administered Medications   Medication Dose Route Frequency Provider Last Rate    acetaminophen  650 mg Oral Q6H PRN Alex S George, DO      aspirin  81 mg Oral Daily Alex S George, DO      atorvastatin  40 mg Oral QPM Alex S George, DO      buPROPion  100 mg Oral BID Alex S George, DO      calcium carbonate  1,000 mg Oral BID PRN Alex S George, DO      clopidogrel  75 mg Oral Daily Alex S George, DO      docusate sodium  100 mg Oral BID Cathy Miranda MD      fluticasone  1 spray Each Nare Daily SAMANTHA Kern      heparin (porcine)  7,500 Units Subcutaneous Q8H MIGEL Alex S George, DO      hydrALAZINE  25 mg Oral Q8H MIGEL Alex S George, DO      insulin lispro  1-5 Units Subcutaneous HS Alex S George, DO      insulin lispro  1-6 Units Subcutaneous TID AC Alex S George, DO      labetalol  300 mg Oral BID Alex S George, DO      lactulose  20 g Oral TID PRN Cathy Miranda MD      loratadine  5 mg Oral Daily SAMANTHA Kern      NIFEdipine  90 mg Oral Daily Alex George,       OLANZapine  10 mg Oral HS Alex George, DO      ondansetron   4 mg Oral Q6H PRN SAMANTHA Kern      pantoprazole  40 mg Oral Early Morning Alex George, DO      polyethylene glycol  17 g Oral Daily Cathy Miranda MD      senna  2 tablet Oral HS Cathy Miranda MD      traZODone  50 mg Oral HS PRN Alex George, DO      Valbenazine Tosylate  40 mg Oral HS Alex George, DO           Lab Results: I have reviewed the following results:  Results from last 7 days   Lab Units 10/28/24  1022   HEMOGLOBIN g/dL 9.8*   HEMATOCRIT % 30.0*   WBC Thousand/uL 6.45   PLATELETS Thousands/uL 190     Results from last 7 days   Lab Units 10/28/24  0501 10/26/24  0842 10/25/24  0755   BUN mg/dL 32* 36* 41*   SODIUM mmol/L 139 140 141   POTASSIUM mmol/L 3.8 3.8 4.1   CHLORIDE mmol/L 108 111* 112*   CREATININE mg/dL 3.55* 4.04* 4.21*              Cathy Miranda MD   Physical Medicine and Rehabilitation   10/29/24    I have spent a total time of 36 minutes in caring for this patient on the day of the visit/encounter including Counseling / Coordination of care, Documenting in the medical record, and Communicating with other healthcare professionals .

## 2024-10-29 NOTE — ASSESSMENT & PLAN NOTE
Hemoglobin at admission 12.1.   10/21 HGB 10.4  10/28 HGB 9.8  Most recent iron panel on 10/15: Iron Sat 38%, TIBC 204, Iron 77, and Ferritin 111.  No need for iron supplementation at this time.  FOBT collected today and pending.   Continue to trend routine CBC.

## 2024-10-29 NOTE — PROGRESS NOTES
"   10/29/24 1230   Pain Assessment   Pain Assessment Tool 0-10   Pain Score No Pain   Restrictions/Precautions   Precautions Bed/chair alarms;Cognitive;Fall Risk;Supervision on toilet/commode  (R hemiweakness)   Lifestyle   Autonomy \"I'm not sure why I'm this tired\"   Eating   Type of Assistance Needed Set-up / clean-up   Comment Pts wife gave pt 4-6 pieces of halloween candy t/o their visit   Eating CARE Score 5   Sit to Stand   Type of Assistance Needed Physical assistance   Physical Assistance Level 25% or less   Comment to HW   Sit to Stand CARE Score 3   Toileting Hygiene   Type of Assistance Needed Physical assistance   Physical Assistance Level 76% or more   Comment pt able to participate partially w/ CM up down, TA for hygiene following XL  BM. reported BM occurence to RN Adela   Toileting Hygiene CARE Score 2   Toilet Transfer   Type of Assistance Needed Physical assistance   Physical Assistance Level 26%-50%   Comment modA SPT recliner<>platform BSC w/ HW in LUE   Toilet Transfer CARE Score 3   Neuromuscular Education   Comments Brooke estim to scapular retractor, tricep. Each for 20min tolerated up to lvl 40. Completed on 10/10 on/off cycle, Assist w/ PROM/AAROM when possible as method for tone reduction (pt developing flexor tone) and to faciliate extension.   Cognition   Overall Cognitive Status Impaired   Arousal/Participation Cooperative;Lethargic   Attention Difficulty attending to directions   Following Commands Follows one step commands with increased time or repetition   Comments Initially pt lethargic for session, unable to maintain arrousal for more than 30s, frequently falling asleep and snoring. /70 and HR 65. When pts family arrived he was able to maintain alertness.   Activity Tolerance   Activity Tolerance Patient limited by fatigue   Assessment   Treatment Assessment OT session focusing on providing immediate family w/ fxnl update, NMR/Estim to estefania RUE which is developing flxor " "tone but shows improvment w/ estim and good tolerance for PROM/AAROM. See below for details of fxnl update to family and observed family dynamics. Pt requires ongoing IPOT to address remaining fxnl deficits. Until his family arrived this afternoon, pt was lethargic and not participaiting well, he was consistently alert once his family arrived. Vitals WFL. OT to continue POC. Will need to consider subacute rehab if pt does not make neccessary fxnl gains to return home w/ limited support.   Assessment of family training See below   Plan   Treatment/Interventions ADL retraining;Functional transfer training;Therapeutic exercise;Endurance training;Cognitive reorientation;Patient/family training;Equipment eval/education;Compensatory technique education;Continued evaluation;Spoke to MD;Spoke to nursing   Progress Progressing toward goals   OT Therapy Minutes   OT Time In 1230   OT Time Out 1400   OT Total Time (minutes) 90   OT Mode of treatment - Individual (minutes) 90   OT Mode of treatment - Concurrent (minutes) 0   OT Mode of treatment - Group (minutes) 0   OT Mode of treatment - Co-treat (minutes) 0   OT Mode of Treatment - Total time(minutes) 90 minutes   OT Cumulative Minutes 270   Therapy Time missed   Time missed? No       Pts wife, Marie, her father, and her dtr (pts step dtr) and step dtrs young son arrived for visit during OT session. Marie relies on others to push her in a manual chair or her electric scooter for community mobility, she presents in a WC pushed by her dtr. The step dtrs young son was very active within the room, often distracting the adults from conversation w/ OT and MD Miranda, when she was present. The young child is still being breast fed from the step dtr, and appears to consume much of her attention. When asked who could be the primary \"helper\" to provide physical assist to the pt if needed upon DC, the step dtr recognizes that she is the only one available, as d/t the health conditions of " "pts wife and pts BERNABE, they will only be able to provide sup at best, and potentially some assist w/ IADLs. Through conversation, the wife states that upon pts return to home she won't be able to provide 24/7 sup to pt as she will, \"return to dialysis.\" It was then brought to light that the pts wife has not been going to dialysis because she does not have anyone to unload her scooter from their van, and then load it back in when she is done at HD. At baseline, the wife reports she drives to and from HD (pt does not drive) and the pt assists w/ loading and unloading the scooter, and with the pt in the hospital she does not have anyone to help her complete this. The wife had a separate conversation w/ MD Miranda regarding this which MD has signed a note regarding this communication. OT provided edu to wife that d/t pts deficits from CVA the pt will be unable to physically assist her, especially w/ loading and unloading her scooter for HD, etc. At baseline, the pt would wait in the car while his wife was completing HD. It is unclear to this OT how else the pt was physically assisting is wife at DC, but his wife as dec insight regarding the necessary changes required for pts safety at DC. In regards to the pt's BERNABE, he did not provide much input to the conversation regarding pts current status and suspected LOF at DC, but he did make several inappropriate remarks to pt and OT. In fact, the pts wife also made an inappropriate remark to her , the pt, regarding infidelity, noting that the OT and the MD present in the room were both female. The pts step dtr, with the young child, reports she works overnight and then sleeps in the morning. She stated that she lives 20min away from the pt and they can call her if they need help. If plan is for pt to return home and rely on assist from these family members, the  step dtr and wife will require FT and edu. If the family is unable to provide the necessary sup and assist at " DC, will need to consider subacute placement.

## 2024-10-29 NOTE — PROGRESS NOTES
10/29/24 0830   Pain Assessment   Pain Assessment Tool 0-10   Pain Score No Pain   Restrictions/Precautions   Precautions Bed/chair alarms;Aspiration;Cognitive;Fall Risk;Supervision on toilet/commode   Cognition   Overall Cognitive Status Impaired   Arousal/Participation Alert;Cooperative   Attention Attends with cues to redirect   Orientation Level Oriented X4   Memory Decreased recall of recent events;Decreased recall of precautions   Following Commands Follows one step commands with increased time or repetition   Subjective   Subjective reports having an ok night, claims to mainly sleep on his stomach at baseline.   Roll Left and Right   Type of Assistance Needed Physical assistance   Physical Assistance Level 25% or less   Comment min A for R UE when rolling to L.   Roll Left and Right CARE Score 3   Sit to Lying   Type of Assistance Needed Physical assistance   Physical Assistance Level 26%-50%   Comment A for LE positioning and trunk repositioning.   Sit to Lying CARE Score 3   Lying to Sitting on Side of Bed   Type of Assistance Needed Physical assistance   Physical Assistance Level 25% or less   Comment min A R sidelying to sit.   Lying to Sitting on Side of Bed CARE Score 3   Sit to Stand   Type of Assistance Needed Physical assistance   Physical Assistance Level 26%-50%   Comment mod A for balance - x1 incident of needing to sit back down and try again.   Sit to Stand CARE Score 3   Bed-Chair Transfer   Type of Assistance Needed Physical assistance   Physical Assistance Level 26%-50%   Comment mod A without AD stand pivot cues to pace self and reach back to sit.   Chair/Bed-to-Chair Transfer CARE Score 3   Walk 10 Feet   Type of Assistance Needed Physical assistance   Physical Assistance Level 26%-50%   Comment HW   Walk 10 Feet CARE Score 3   Ambulation   Primary Mode of Locomotion Prior to Admission Walk   Distance Walked (feet) 10 ft  (3x30 along L wall railing.)   Assist Device Alberto Walker   Gait  Pattern Trendelenburg;R hemiparesis;Decreased foot clearance;Improper weight shift;Step to;Step through;R foot drag   Limitations Noted In Swing;Strength;Speed;Heel Strike;Endurance;Balance   Provided Assistance with: Weight Shift;Balance;Trunk Support   Walk Assist Level Minimum Assist;Moderate Assist   Findings cues for sequence with HW; cues for R swing when using wall railing, limited by ftg. (2.5# wt to R UE for tone reduction.   Does the patient walk? 2. Yes   Therapeutic Interventions   Strengthening supine bridges, AA hip/knee flex/ext; sidelying clam shells x20 each.   Flexibility R UE stretching for tone reduction seated with approximation and ext x5min - seated B LE DF/knee ext 5x15 sec.   Neuromuscular Re-Education wall railing walking 3x30ft, x2 trial amb backwards   Assessment   Treatment Assessment Pt seen for 60 min skilled PT intervention in efforts to faciliate neuro recovery via strengthening, stretching, and use of L wall railing for amb. pt limited by Ftg. Edcuation to pt and staff to attend to R UE progressive tone - pt at risk for contracture. Gait limited by ftg and weakness. continue strength and gait for reciprocation and recovery. Repeat steps and trial Nustep, need to talk with family regarding support at DC, per staff spouse use of power chair and goes to HD. Cotninue bed chair alarms due to questionable insight to deficits, pt to only amb with therapies at this time.   Plan   Progress Progressing toward goals   PT Therapy Minutes   PT Time In 0830   PT Time Out 0930   PT Total Time (minutes) 60   PT Mode of treatment - Individual (minutes) 60   PT Mode of treatment - Concurrent (minutes) 0   PT Mode of treatment - Group (minutes) 0   PT Mode of treatment - Co-treat (minutes) 0   PT Mode of Treatment - Total time(minutes) 60 minutes   PT Cumulative Minutes 180

## 2024-10-29 NOTE — PCC SPEECH THERAPY
Pt currently being followed for cognitive and speech tx sessions. In regard to cognitive skills, pt was able to complete formalized cognitive assessment, CLQT+ on initial evaluation with a Composite Severity Rating score of 1.2 out of 4.0, correlating to overall severely impaired cognitive linguistic impairments at time of evaluation and in comparison to age matched peers ranging from 18-70 y/o. Cognitive barriers which present include: decreased attention, ST memory recall , problem solving, reasoning, sequencing, organization of thoughts, judgement, slower processing, insight, and as well as fatigue, which still impacts pt's overall safety, functional cognitive communication skills as well as functional mobility. The following interventions are used to target these barriers, including verbal problem solving task, verbal working memory tasks, visual memory recall tasks, drawing conclusions activities, written sequencing tasks, verbal sequencing tasks, categorization tasks , picture problem solving activities, verbal reasoning tasks, verbal review of current medications,  written health management tasks, verbal health management tasks, functional reading tasks , and family education/training.     Additionally, pt completed Motor Speech Evaluation, in which pt is demonstrating speech intelligibility to be moderate-severely impaired at the sentence and conversational level. Pt elicited the following errors imprecise articulation, distorted speech sounds, decreased breath support for speech and fast rate of speech. Pt noted to have decrease production and sentence and conversation level over time in evaluation with pt getting more fatigued. Pt reports insight with decrease intelligibility. Speech barriers which present include: decreased intelligibility decreased at word, sentence and conversational level, imprecise articulation , and faster rate of speech which still impacts pt's overall safety, functional cognitive  communication skills as well as functional mobility.  The following interventions are used to target these barriers, including review of speech strategies for carryover in conversation, OME review and speech drills.     Current level of functioning:   Comprehension: min A  Expression: mod-max A  Social Interaction: min A  Executive functions: mod A   Memory: mod A      This week, focus for continued services to target review of OME's, speech strategies, use of speech drills to maximize speech intelligibility. Also will initiate targeting cognitive skills targeting ST recall, problem solving, organization of thoughts, sequencing, as well as maximizing more insight to stroke education and prevention. Family training/education has not been initiated with pt's family but will be needed throughout pt's stay and in preparation for discharge. Recommendations at time of discharge are for continued skilled SLP services but pending progress to determine home vs OP.    At this time, pt will continue to benefit from skilled cognitive linguistic tx and speech/apraxia tx session to maximize overall functional independence given overall cognitive linguistic skills and speech and intelligibility in attempts to decreased caregiver burden over time.           Update from week 11/5/2024: Pt is being followed for cognitive linguistic and speech sessions to where pt is making slower and steady progress this week.     Continued cognitive barriers which present include: decreased attention, ST memory recall , problem solving, reasoning, sequencing, organization of thoughts, judgement, slower processing, insight, and as well as fatigue, which impact pt's overall safety, functional cognitive communication skills and functional mobility. The following interventions are used to target these barriers, including verbal problem solving task, verbal working memory tasks, visual memory recall tasks, drawing conclusions activities, written  sequencing tasks, verbal sequencing tasks, categorization tasks , picture problem solving activities, verbal reasoning tasks, written financial management tasks, verbal review of current medications, written review of medications, written calendar tasks, tangible scheduling tasks ,  written health management tasks, verbal health management tasks, visual attention tasks, functional reading tasks , and family education/training.     Continued speech barriers which present include: imprecise articulation, distorted speech sounds, decreased breath support for speech and fast rate of speech, which impact pt's overall functional communication skills as his verbal expression continues to be limited by at least moderate dysarthria, noted at the phrase and sentence level with more severe deficits in speech intelligibility at the conversational level. The following interventions are used to target these barriers, including including review of speech strategies for carryover in conversation, OME review and speech drills.     Current level of functioning:   Comprehension: min A  Expression: min A-supervision  Social Interaction: min A-supervision  Executive functions: mod A  Memory: mod A    Family training/education has not yet been initiated, however, plan to begin with pt's wife this week. Current recommendations at time of discharge are for pt to have assistance with all IADLs due to cognitive linguistic deficits. This week, focus for continued services to target initiating higher level IADL skills and assessment of tasks such as finance management, medication management, and planning/managing appts, along with continued focus on memory, safety, problem solving and speech intelligibility. At this time, pt is recommended for and will continue to benefit form further skilled SLP services focusing on overall cognitive linguistic skills and speech intelligibility skills in order to maximize independence on discharge.        Update from week: 11/13/2024. Pt is being followed for cognitive linguistic tx and speech tx sessions to where pt is making slower and steady progress this week.     Continued cognitive barriers which present include: decreased attention, visual attention, ST memory recall , problem solving, reasoning, sequencing, organization of thoughts, judgement, slower processing, and insight, which still impacts pt's overall safety, functional cognitive communication skills as well as functional mobility. The following interventions are used to target these barriers, including verbal problem solving task, drawing conclusions activities, verbal sequencing tasks, categorization tasks , picture problem solving activities, verbal reasoning tasks, verbal review of current medications, written review of medications, visual retraining activities.,  written health management tasks, verbal health management tasks, visual attention tasks, functional reading tasks , and family education/training.     Continued speech barriers which present include: decreased intelligibility decreased at conversational level, imprecise articulation , and faster rate of speech which still impacts pt's overall safety, functional cognitive communication skills as well as functional mobility.  The following interventions are used to target these barriers, including OME review, speech strategies, speech drills, engagement in spontaneous speech activities.     Current level of functioning:   Comprehension: min A   Expression: supervision-min A  Social Interaction: supervision  Executive functions: mod A  Memory: mod A    Family training/education had been initiated with pt's spouse, but will need to touch base given recommendations for spouse to complete I ADL tasks for improvement in compliance in medication management, appointment scheduling, finances, etc. Pt has been exhibiting difficulty in visual perceptual tasks which will also impact his ability  to complete reading comprehension given information on pill bottles, bills, etc. While overall speech intelligibility has improved, overall consistent carryover of speech strategies is decreased. Recommendations at time of discharge are for continued skilled ST services (home vs OP). This week, focus for continued services to target ongoing review of speech strategies to maximize overall intelligibility, review of medications for improving comprehension of reasoning for taking medications, ongoing functional problem solving, reasoning skills and ST recall strategies. At this time, pt will continue to benefit from skilled cognitive linguistic tx and speech tx session to maximize overall functional independence given overall cognitive linguistic skills and speech intelligibility in attempts to decreased caregiver burden over time.

## 2024-10-30 LAB
ANION GAP SERPL CALCULATED.3IONS-SCNC: 6 MMOL/L (ref 4–13)
BASOPHILS # BLD AUTO: 0.06 THOUSANDS/ΜL (ref 0–0.1)
BASOPHILS NFR BLD AUTO: 1 % (ref 0–1)
BUN SERPL-MCNC: 32 MG/DL (ref 5–25)
CALCIUM SERPL-MCNC: 8.6 MG/DL (ref 8.4–10.2)
CHLORIDE SERPL-SCNC: 109 MMOL/L (ref 96–108)
CO2 SERPL-SCNC: 23 MMOL/L (ref 21–32)
CREAT SERPL-MCNC: 3.63 MG/DL (ref 0.6–1.3)
EOSINOPHIL # BLD AUTO: 0.24 THOUSAND/ΜL (ref 0–0.61)
EOSINOPHIL NFR BLD AUTO: 3 % (ref 0–6)
ERYTHROCYTE [DISTWIDTH] IN BLOOD BY AUTOMATED COUNT: 14.8 % (ref 11.6–15.1)
GFR SERPL CREATININE-BSD FRML MDRD: 17 ML/MIN/1.73SQ M
GLUCOSE P FAST SERPL-MCNC: 94 MG/DL (ref 65–99)
GLUCOSE SERPL-MCNC: 113 MG/DL (ref 65–140)
GLUCOSE SERPL-MCNC: 123 MG/DL (ref 65–140)
GLUCOSE SERPL-MCNC: 131 MG/DL (ref 65–140)
GLUCOSE SERPL-MCNC: 94 MG/DL (ref 65–140)
GLUCOSE SERPL-MCNC: 96 MG/DL (ref 65–140)
HCT VFR BLD AUTO: 27.5 % (ref 36.5–49.3)
HGB BLD-MCNC: 9.4 G/DL (ref 12–17)
IMM GRANULOCYTES # BLD AUTO: 0.08 THOUSAND/UL (ref 0–0.2)
IMM GRANULOCYTES NFR BLD AUTO: 1 % (ref 0–2)
LYMPHOCYTES # BLD AUTO: 2.28 THOUSANDS/ΜL (ref 0.6–4.47)
LYMPHOCYTES NFR BLD AUTO: 30 % (ref 14–44)
MCH RBC QN AUTO: 26.7 PG (ref 26.8–34.3)
MCHC RBC AUTO-ENTMCNC: 34.2 G/DL (ref 31.4–37.4)
MCV RBC AUTO: 78 FL (ref 82–98)
MONOCYTES # BLD AUTO: 0.9 THOUSAND/ΜL (ref 0.17–1.22)
MONOCYTES NFR BLD AUTO: 12 % (ref 4–12)
NEUTROPHILS # BLD AUTO: 4.1 THOUSANDS/ΜL (ref 1.85–7.62)
NEUTS SEG NFR BLD AUTO: 53 % (ref 43–75)
NRBC BLD AUTO-RTO: 0 /100 WBCS
PHOSPHATE SERPL-MCNC: 3.8 MG/DL (ref 2.7–4.5)
PLATELET # BLD AUTO: 192 THOUSANDS/UL (ref 149–390)
PMV BLD AUTO: 10.9 FL (ref 8.9–12.7)
POTASSIUM SERPL-SCNC: 4.6 MMOL/L (ref 3.5–5.3)
RBC # BLD AUTO: 3.52 MILLION/UL (ref 3.88–5.62)
SODIUM SERPL-SCNC: 138 MMOL/L (ref 135–147)
WBC # BLD AUTO: 7.66 THOUSAND/UL (ref 4.31–10.16)

## 2024-10-30 PROCEDURE — 97112 NEUROMUSCULAR REEDUCATION: CPT

## 2024-10-30 PROCEDURE — 99232 SBSQ HOSP IP/OBS MODERATE 35: CPT | Performed by: INTERNAL MEDICINE

## 2024-10-30 PROCEDURE — 97110 THERAPEUTIC EXERCISES: CPT

## 2024-10-30 PROCEDURE — 97535 SELF CARE MNGMENT TRAINING: CPT

## 2024-10-30 PROCEDURE — 97129 THER IVNTJ 1ST 15 MIN: CPT

## 2024-10-30 PROCEDURE — 99233 SBSQ HOSP IP/OBS HIGH 50: CPT | Performed by: INTERNAL MEDICINE

## 2024-10-30 PROCEDURE — 85025 COMPLETE CBC W/AUTO DIFF WBC: CPT | Performed by: NURSE PRACTITIONER

## 2024-10-30 PROCEDURE — 84100 ASSAY OF PHOSPHORUS: CPT | Performed by: NURSE PRACTITIONER

## 2024-10-30 PROCEDURE — 94003 VENT MGMT INPAT SUBQ DAY: CPT

## 2024-10-30 PROCEDURE — 80048 BASIC METABOLIC PNL TOTAL CA: CPT | Performed by: NURSE PRACTITIONER

## 2024-10-30 PROCEDURE — 94660 CPAP INITIATION&MGMT: CPT

## 2024-10-30 PROCEDURE — 82948 REAGENT STRIP/BLOOD GLUCOSE: CPT

## 2024-10-30 PROCEDURE — 92610 EVALUATE SWALLOWING FUNCTION: CPT

## 2024-10-30 PROCEDURE — 92507 TX SP LANG VOICE COMM INDIV: CPT

## 2024-10-30 RX ADMIN — ATORVASTATIN CALCIUM 40 MG: 40 TABLET, FILM COATED ORAL at 17:21

## 2024-10-30 RX ADMIN — HYDRALAZINE HYDROCHLORIDE 25 MG: 25 TABLET ORAL at 06:07

## 2024-10-30 RX ADMIN — VALBENAZINE 40 MG: 40 CAPSULE ORAL at 22:57

## 2024-10-30 RX ADMIN — DOCUSATE SODIUM 100 MG: 100 CAPSULE, LIQUID FILLED ORAL at 09:19

## 2024-10-30 RX ADMIN — HYDRALAZINE HYDROCHLORIDE 25 MG: 25 TABLET ORAL at 13:24

## 2024-10-30 RX ADMIN — ASPIRIN 81 MG: 81 TABLET, COATED ORAL at 09:19

## 2024-10-30 RX ADMIN — BUPROPION HYDROCHLORIDE 100 MG: 100 TABLET, FILM COATED ORAL at 09:19

## 2024-10-30 RX ADMIN — PANTOPRAZOLE SODIUM 40 MG: 40 TABLET, DELAYED RELEASE ORAL at 06:07

## 2024-10-30 RX ADMIN — SENNOSIDES 17.2 MG: 8.6 TABLET, FILM COATED ORAL at 22:48

## 2024-10-30 RX ADMIN — FLUTICASONE PROPIONATE 1 SPRAY: 50 SPRAY, METERED NASAL at 09:17

## 2024-10-30 RX ADMIN — HYDRALAZINE HYDROCHLORIDE 25 MG: 25 TABLET ORAL at 22:47

## 2024-10-30 RX ADMIN — HEPARIN SODIUM 7500 UNITS: 5000 INJECTION INTRAVENOUS; SUBCUTANEOUS at 22:46

## 2024-10-30 RX ADMIN — DOCUSATE SODIUM 100 MG: 100 CAPSULE, LIQUID FILLED ORAL at 17:20

## 2024-10-30 RX ADMIN — CLOPIDOGREL BISULFATE 75 MG: 75 TABLET ORAL at 09:18

## 2024-10-30 RX ADMIN — NIFEDIPINE 90 MG: 30 TABLET, FILM COATED, EXTENDED RELEASE ORAL at 09:18

## 2024-10-30 RX ADMIN — BUPROPION HYDROCHLORIDE 100 MG: 100 TABLET, FILM COATED ORAL at 17:20

## 2024-10-30 RX ADMIN — OLANZAPINE 10 MG: 2.5 TABLET, FILM COATED ORAL at 22:47

## 2024-10-30 RX ADMIN — LORATADINE 5 MG: 10 TABLET ORAL at 09:19

## 2024-10-30 RX ADMIN — HEPARIN SODIUM 7500 UNITS: 5000 INJECTION INTRAVENOUS; SUBCUTANEOUS at 13:25

## 2024-10-30 RX ADMIN — LABETALOL HYDROCHLORIDE 300 MG: 100 TABLET, FILM COATED ORAL at 09:19

## 2024-10-30 RX ADMIN — HEPARIN SODIUM 7500 UNITS: 5000 INJECTION INTRAVENOUS; SUBCUTANEOUS at 06:07

## 2024-10-30 RX ADMIN — LABETALOL HYDROCHLORIDE 300 MG: 100 TABLET, FILM COATED ORAL at 22:57

## 2024-10-30 NOTE — PROGRESS NOTES
"   10/30/24 2838   Pain Assessment   Pain Assessment Tool 0-10   Pain Score No Pain   Patient's Stated Pain Goal No pain   Restrictions/Precautions   Precautions Bed/chair alarms;Cognitive;Fall Risk;Supervision on toilet/commode   Weight Bearing Restrictions No   ROM Restrictions No   Lifestyle   Autonomy \"I'm ready to go.\"   Reciprocal Relationships family   Service to Others Retired working in [employer]   Intrinsic Gratification TV   Upper Body Dressing   Type of Assistance Needed Physical assistance   Physical Assistance Level 26%-50%   Comment able to doff his sweat shirt with supervision, but mod A to follow hemidressing techniques to thread the right UE, then min A for bringing the shirt down on the right side   Upper Body Dressing CARE Score 3   Sit to Stand   Type of Assistance Needed Physical assistance   Physical Assistance Level 25% or less   Comment min A to HW   Sit to Stand CARE Score 3   Bed-Chair Transfer   Type of Assistance Needed Physical assistance   Physical Assistance Level 25% or less   Comment min A with HW VC's for direction of pivot   Chair/Bed-to-Chair Transfer CARE Score 3   Functional Standing Tolerance   Time 3min   ROM- Right Upper Extremities   R Shoulder PROM;AAROM   R Elbow PROM;AAROM   R Wrist PROM;AAROM   RUE ROM Comment education provided to Pt and nurse regarding use of double pillows between trunk and RUE to facilitate shoulder ABD elbow extension and digital extension. AA/PROM provided to RUE to reduce tone, improve available ROM and facilitate active muscle engagement. Pt able to stand with left UE support on large table and forward flex to perform pendulum exercises x 30 reps rotation in each direction, OT blocking right knee for safety.   Therapeutic Excerise-Strength   UE Strength Yes  (left UE 4# DB for shoulder flex, ABD, bicep curls with simultaneous P/AAROM of the RUE by OT to mirror the motion.)   Neuromuscular Education   RUE Weight Bearing Extended arm standing  (Pt " performed 2 sets of 10 modified push-ups standing at large table, also, RUE for UB support during forward reaching with left hand outside JENNA)   Response to Weight Bearing Technique increased awareness of RUE position during reaching tasks, increased encorporation of UE to return to upright stance, reduced flexor tone.   Comments biphasic estim to posterior shoulder and proximal tricep x 20min, distal tricep and posterior forearm  x 20 min, during ROM, bilateral UE exercise (left with 4# weight while right AAROM) and WB techniques.   Cognition   Overall Cognitive Status Impaired   Arousal/Participation Cooperative;Alert   Attention Attends with cues to redirect   Orientation Level Oriented X4   Memory Decreased recall of recent events;Decreased recall of precautions   Following Commands Follows one step commands with increased time or repetition   Activity Tolerance   Activity Tolerance Patient tolerated treatment well   Medical Staff Made Aware (S)  RUE positioning recommendations. Discussion with Dr Miranda regarding tone reduction methods. At this time flexor tone is not causing pain, difficulty with dressing, skin integrity decline, therefore she does not want to trial more invasive methods such as botox or baclofen due to his current variable arousal levels. OT to contiue estim, NMR and positioning techniques for tone reduction.   Assessment   Treatment Assessment Pt participated in 90 min OT session primarily focused on NMR techniques for RUE tone reduction, improved active muscle facilitation, use as a stabilizing assist, appropriate positioning in his room. Pt tolerating estim well with good tone reduction following treatment, improved movement and ability to use as a stabilizing assist. Improved ability to doff clothes, need for consistent training with estefania dressing techniques through task segmentation and repetition. Continue skilled OT intervention under current POC to maximize functional independence and  safety with daily selfcare and related mobility.   Prognosis Good   Problem List Decreased strength;Decreased range of motion;Impaired balance;Decreased endurance;Decreased mobility;Decreased coordination;Decreased safety awareness;Obesity   Barriers to Discharge Inaccessible home environment;Decreased caregiver support   Plan   Treatment/Interventions ADL retraining;Functional transfer training;Therapeutic exercise;Endurance training;Cognitive reorientation;Patient/family training;Equipment eval/education;Compensatory technique education;Continued evaluation;Spoke to MD;Spoke to nursing   Progress Progressing toward goals   OT Therapy Minutes   OT Time In 0830   OT Time Out 1000   OT Total Time (minutes) 90   OT Mode of treatment - Individual (minutes) 90   OT Mode of treatment - Concurrent (minutes) 0   OT Mode of treatment - Group (minutes) 0   OT Mode of treatment - Co-treat (minutes) 0   OT Mode of Treatment - Total time(minutes) 90 minutes   OT Cumulative Minutes 360   Therapy Time missed   Time missed? No

## 2024-10-30 NOTE — PROGRESS NOTES
Progress Note - Internal Medicine   Name: Tommie Taylor 59 y.o. male I MRN: [id number]  Unit/Bed#: -01 I Date of Admission: 10/26/2024   Date of Service: 10/30/2024 I Hospital Day: 4    Assessment & Plan  CVA (cerebral vascular accident) (HCC)  Initial ED presentation 10/14/2024 with RUE and RLE extremity weakness and R facial droop. CTH and CTA were initially negative for acute process, LKW was 1 hour prior to presentation.   , cardene drip initiated to stabilized BP, TNK administration after stabilization of HTN. Symptoms worsened s/p TNK but f/u CT again demonstrated no acute process.   24 hr post-TNK CT negative.   MRI completed, demonstrated indicated left corona radiata stroke.   DAPT initiated for 3 weeks with plan for transition to asa monotherapy.   Continue ASA 81mg and Plavix 75mg daily until 11/6 then transition to ASA 81mg daily monotherapy.  Cardiology recommends Zio patch after discharge.  Follow up with neurovascular after discharge.     PT/OT/ST per primary team.   Bipolar I disorder, severe, current or most recent episode depressed, with psychotic features ([facility])  Currently on bupropion 100mg BID and olanzapine 10mg at HS with valbenazine tosylate 40mg for tardive dyskinesia per home regimen. Follow up outpatient with psychiatry.   GERD (gastroesophageal reflux disease)  EGD with GI 10/14, directed to continue with Protonix. Tums available PRN for dyspepsia.   Insomnia  Educate and encourage on sleep hygiene. Continue Trazodone at HS for sleep.   DAISY (obstructive sleep apnea)  Continue use of CPAP with home settings.  Does not use CPAP at home - currently broken.  Recommend overnight oximetry prior to discharge.   Anemia, unspecified  Hemoglobin at admission 12.1.   10/21 HGB 10.4  10/28 HGB 9.8  Most recent iron panel on 10/15: Iron Sat 38%, TIBC 204, Iron 77, and Ferritin 111.  No need for iron supplementation at this time.  FOBT collected yesterday and negative  Continue to trend  routine CBC.  Hyperlipidemia  Most recent lipid panel 10/16/24  Total Cholesterol 154  Trig 120  HDL 40  LDL 90   Continue statin therapy atorvastatin 40mg QPM increase from home 20mg daily.  Class 3 severe obesity due to excess calories with serious comorbidity and body mass index (BMI) of 40.0 to 44.9 in adult ([facility])  BMI 42.74 on admission. Encourage lifestyle modifications and provide support for nutritional teaching. Consult placed to nutrition services during acute course.   Tardive dyskinesia  Continue on home treatment Valbenazine Tosylate 40mg HS.   Hypertension  Presented with hypertensive emergency on initial hospitalization.   Concerns about medication non-compliance.  Home regimen: labetalol 100mg BID and amlodipine 10mg daily.  Target SBP 140s, currently on labetalol 300mg BID and Nifedipine 90mg daily with hydralazine 25mg Q8 hours. Torsemide D/C r/t ROSINA on 10/23.   Nephrology following.   DM2 (diabetes mellitus, type 2) ([facility])  Lab Results   Component Value Date    HGBA1C 8.6 (H) 10/16/2024       Recent Labs     10/29/24  1554 10/29/24  2053 10/30/24  0600 10/30/24  1117   POCGLU 144* 124 96 123       Blood Sugar Average: Last 72 hrs:  (P) [id number]    Continue insulin management with sliding scale.  Pt had been using ozempic for home glucose management but ran out and did not have any other glycemic control agents.     Acute kidney injury superimposed on stage 4 chronic kidney disease ([facility])  Lab Results   Component Value Date    EGFR 17 10/30/2024    EGFR 17 10/28/2024    EGFR 15 10/26/2024    CREATININE 3.63 (H) 10/30/2024    CREATININE 3.55 (H) 10/28/2024    CREATININE 4.04 (H) 10/26/2024     Baseline creatinine 2.5-2.9.   10/28/24 Creatinine 3.55 from 4.04, improving.   Avoid nephrotoxins.  Encourage hydration.  Continue to trend CMPs M/W/F.  Renal ultrasound demonstrates no hydro but mild bladder wall thickening.   Nephrology following.   Follows with Dr. No (Nephrology) as  outpatient.  Encephalopathy  Lethargy and encephalopathic symptoms noted during acute course.  B12 and head CT WNL. No subsequent episodes noted.   EEG on 10/22 consistent with moderate nonspecific cerebral dysfunction, no seizure activity.  Continue to monitor behavior and symptoms for signs of changes.   Most recent MoCA was  in 2024.  Per Psych - if continues to have delirium consider discontinuing Wellbutrin.  Concerns DAISY could also be contributing to encephalopathy per acute care.  Impaired mobility and activities of daily living  Continue PT/OT/ST treatment.   Overgrown toenails  Podiatry consult.   Vitamin D deficiency  Vitamin D level <7 on 10/7.  Continue home vitamin D 50,000u weekly.  Continue to follow-up with Nephrology as outpatient.  At risk for alteration in bowel function      VTE Pharmacologic Prophylaxis:   Pharmacologic: Heparin  Mechanical VTE Prophylaxis in Place: Yes SCDs in bed     Current Length of Stay: 4 day(s)    Current Patient Status: Inpatient Rehab     Discharge Plan: As per treatment team     Code Status: Level 1 - Full Code    Subjective:   Examined patient while seated in recliner.  He reports that he continues to feel fatigued, he did use his CPAP machine last evening.  He did have constipation, had a dose of lactulose yesterday with 3 bowel movements, he feels that he emptied out and no longer feels constipated.  He denies dysuria.  He denies pain.  Objective:     Vitals:   Temp (24hrs), Av.4 °F (36.3 °C), Min:96.1 °F (35.6 °C), Max:98.4 °F (36.9 °C)    Temp:  [96.1 °F (35.6 °C)-98.4 °F (36.9 °C)] 96.1 °F (35.6 °C)  HR:  [64-80] 74  Resp:  [18] 18  BP: (130-152)/(66-75) 152/71  SpO2:  [97 %-100 %] 98 %  Body mass index is 42.74 kg/m².     Review of Systems   Constitutional:  Positive for fatigue. Negative for activity change, appetite change, chills, diaphoresis and fever.   HENT:  Negative for congestion, ear discharge, ear pain, postnasal drip, rhinorrhea, sinus  pressure, sinus pain and sore throat.    Eyes:  Negative for pain, discharge, itching and visual disturbance.   Respiratory:  Negative for cough, chest tightness, shortness of breath and wheezing.    Cardiovascular:  Negative for chest pain, palpitations and leg swelling.   Gastrointestinal:  Negative for abdominal pain, constipation, diarrhea, nausea and vomiting.   Endocrine: Negative for polydipsia, polyphagia and polyuria.   Genitourinary:  Negative for difficulty urinating, dysuria and urgency.   Musculoskeletal:  Negative for arthralgias, back pain and neck pain.   Skin:  Negative for rash and wound.   Neurological:  Positive for facial asymmetry (Ride side) and weakness (RUE and RLE). Negative for dizziness, numbness and headaches.        Input and Output Summary (last 24 hours):       Intake/Output Summary (Last 24 hours) at 10/30/2024 1246  Last data filed at 10/30/2024 1105  Gross per 24 hour   Intake 1140 ml   Output 1275 ml   Net -135 ml       Physical Exam:     Physical Exam  Vitals reviewed.   Constitutional:       General: He is not in acute distress.     Appearance: He is not diaphoretic.   HENT:      Head: Normocephalic and atraumatic.   Cardiovascular:      Rate and Rhythm: Normal rate and regular rhythm.   Pulmonary:      Effort: Pulmonary effort is normal. No respiratory distress.      Breath sounds: Normal breath sounds. No wheezing, rhonchi or rales.   Abdominal:      General: Bowel sounds are normal. There is no distension.      Palpations: Abdomen is soft.      Tenderness: There is no abdominal tenderness.   Musculoskeletal:      Right lower leg: Edema present.      Left lower leg: Edema present.   Skin:     General: Skin is warm and dry.   Neurological:      Mental Status: He is alert. Mental status is at baseline.      Motor: Weakness (right sided) present.   Psychiatric:         Mood and Affect: Mood normal.         Behavior: Behavior normal.         Additional Data:     Labs:    Results  from last 7 days   Lab Units 10/30/24  0601   WBC Thousand/uL 7.66   HEMOGLOBIN g/dL 9.4*   HEMATOCRIT % 27.5*   PLATELETS Thousands/uL 192   SEGS PCT % 53   LYMPHO PCT % 30   MONO PCT % 12   EOS PCT % 3     Results from last 7 days   Lab Units 10/30/24  0601   SODIUM mmol/L 138   POTASSIUM mmol/L 4.6   CHLORIDE mmol/L 109*   CO2 mmol/L 23   BUN mg/dL 32*   CREATININE mg/dL 3.63*   ANION GAP mmol/L 6   CALCIUM mg/dL 8.6   GLUCOSE RANDOM mg/dL 94         Results from last 7 days   Lab Units 10/30/24  1117 10/30/24  0600 10/29/24  2053 10/29/24  1554 10/29/24  1118 10/29/24  0530 10/28/24  1949 10/28/24  1636 10/28/24  1616 10/28/24  1124 10/28/24  0626 10/27/24  2107   POC GLUCOSE mg/dl 123 96 124 144* 127 83 121 132 142* 168* 90 107               Labs reviewed    Imaging:    Imaging reviewed    Recent Cultures (last 7 days):           Last 24 Hours Medication List:   Current Facility-Administered Medications   Medication Dose Route Frequency Provider Last Rate    acetaminophen  650 mg Oral Q6H PRN Alex S George, DO      aspirin  81 mg Oral Daily Alex S George, DO      atorvastatin  40 mg Oral QPM Alex S George, DO      buPROPion  100 mg Oral BID Alex S George, DO      calcium carbonate  1,000 mg Oral BID PRN Alex S George, DO      clopidogrel  75 mg Oral Daily Alex S George, DO      docusate sodium  100 mg Oral BID Cathy Miranda MD      fluticasone  1 spray Each Nare Daily SAMANTHA Kern      heparin (porcine)  7,500 Units Subcutaneous Q8H MIGEL Alex S George, DO      hydrALAZINE  25 mg Oral Q8H MIGEL Alex S George, DO      insulin lispro  1-5 Units Subcutaneous HS Alex S George, DO      insulin lispro  1-6 Units Subcutaneous TID AC Alex S George, DO      labetalol  300 mg Oral BID Alex S George, DO      lactulose  20 g Oral TID PRN Cathy Miranda MD      loratadine  5 mg Oral Daily SAMANTHA Kern      NIFEdipine  90 mg Oral Daily Alex George,       OLANZapine  10 mg Oral HS Alex George,  DO      ondansetron  4 mg Oral Q6H PRN SAMANTHA Kern      pantoprazole  40 mg Oral Early Morning Alex George, DO      polyethylene glycol  17 g Oral Daily Cathy Miranda MD      senna  2 tablet Oral HS Cathy Miranda MD      traZODone  50 mg Oral HS PRN Alex George, DO      Valbenazine Tosylate  40 mg Oral HS Alex George, DO          M*Modal software was used to dictate this note.  It may contain errors with dictating incorrect words or incorrect spelling. Please contact the provider directly with any questions.

## 2024-10-30 NOTE — ASSESSMENT & PLAN NOTE
Constipated no bm for several days; lactulose PRN x3 or until BM : had Bmx3 on 10/29  Start colase, senna daily and miralax prn  Titrate as needed

## 2024-10-30 NOTE — ASSESSMENT & PLAN NOTE
Lab Results   Component Value Date    EGFR 17 10/30/2024    EGFR 17 10/28/2024    EGFR 15 10/26/2024    CREATININE 3.63 (H) 10/30/2024    CREATININE 3.55 (H) 10/28/2024    CREATININE 4.04 (H) 10/26/2024     Baseline creatinine 2.5-2.9.   10/28/24 Creatinine 3.55 from 4.04, improving.   Avoid nephrotoxins.  Encourage hydration.  Continue to trend CMPs M/W/F.  Renal ultrasound demonstrates no hydro but mild bladder wall thickening.   Nephrology following.   Follows with Dr. No (Nephrology) as outpatient.

## 2024-10-30 NOTE — NURSING NOTE
Part of the patient's left thumbnail detatched while repositioning in bed. The nail on the nail bed is intact. The patient had a long nail. Patient reported the nail was already coming off. Minimal bleeding noted at the tip of the thumb. Area cleansed with 3M No Sting, and a Band-Aid was applied around the thumb. Patient is resting in bed. Call bell within reach and bed alarm activated.

## 2024-10-30 NOTE — ASSESSMENT & PLAN NOTE
Lab Results   Component Value Date    EGFR 17 10/30/2024    EGFR 17 10/28/2024    EGFR 15 10/26/2024    CREATININE 3.63 (H) 10/30/2024    CREATININE 3.55 (H) 10/28/2024    CREATININE 4.04 (H) 10/26/2024     Recent creatinine of 3.63 10/30 Prior baseline around 2.9-3.0  Etiology felt to be related potentially to ATN in the setting of uncontrolled hypertension and complicated by contrast associated nephropathy  Nephrology was consulted and followed and a UA showed microhematuria and proteinuria.  An ultrasound of the kidney/bladder showed wall thickening but no hydronephrosis  Bladder scans negative for retention  Monitor BMP triweekly or sooner if clinically indicated  Demadex has been on hold and had periodically required IV fluids  Nephrology following  Follow with nephrology as an outpatient or sooner on the ARC if any acute changes

## 2024-10-30 NOTE — ASSESSMENT & PLAN NOTE
Patient was evaluated by the rehabilitation team MD and an appropriate candidate for acute inpatient rehabilitation program at this time.  The patient will tolerate 3 hours/day 5 to 7 days/week of intensive physical, occupational and speech therapy in order to obtain goals for community discharge  Due to the patient's functional Compared to their baseline level of function in addition to their ongoing medical needs, the patient would benefit from daily supervision from a rehabilitation physician as well as rehabilitation nursing to implement and adjust the medical as well as functional plan of care in order to meet the patient's goals.  This patient was discussed by the Interdisciplinary Team in weekly case conference today. The care of the patient was extensively discussed with all care providers and an appropriate rehabilitation plan was formulated unique for this patient. Barriers were identified preventing progression of therapy and appropriate interventions were discussed with each discipline. Please see the team note for input from all disciplines regarding barriers, intervention, and discharge planning.  DC: ELLE starks

## 2024-10-30 NOTE — PROGRESS NOTES
10/30/24 1230   Pain Assessment   Pain Assessment Tool 0-10   Pain Score No Pain   Restrictions/Precautions   Precautions Bed/chair alarms;Cognitive;Fall Risk;Supervision on toilet/commode   Comprehension   Comprehension (FIM) 4 - Understands basic info/conversation 75-90% of time   Expression   Expression (FIM) 4 - Expresses basic info/needs 75-90% of time   Social Interaction   Social Interaction (FIM) 5 - Interacts appropriately with others 90% of time   Problem Solving   Problem solving (FIM) 4 - Solves basic problems 75-89% of time   Memory   Memory (FIM) 3 - Recognizes, recalls/performs 50-74%   Speech/Language/Cognition Assessmetn   Treatment Assessment In addition to completing dysphagia assessment, SLP completed full review of Oral Motor Exercises (OME's). SLP reviewing all items targeting pt's lips, tongue and face. SLP did have to provide modeling to complete these OME's but pt was able to execute ALL exercises w/o difficulty except for 1 facial one which requires isolation of air in L to the R cheek. Otherwise, pt was able to complete exercises 10x/each. SLP providing pt w/ education in regard to how each exercise to be completed 10x each and 2 times throughout the day in hopes for maximizing overall articulators in improved functioning.     As pt's overall FELIPE was SIGNIFICANTLY improved today, pt's speech intelligibility was noted to range between 60-80% accurate today, still demonstrating decreased articulation skills, faster rate of speech, decreased breath support to sustain speech across conversation. SLP providing education in regard to how as fatigued as pt was yesterday, this HIGHLY impacts clarity of speech. Pt verbalizing understanding given education.     Lastly, targeted functional cognitive skills, in which SLP provided pt w/ a verbal problem and then 4 possible solutions to each problem. Pt was to identify the FIRST step towards a solution. Pt was noted to be 14/16 accurate increasing to  15/16 upon review for a better first step towards solution. Otherwise, reviewed orientation information, to where pt needed cues for recalling city, month/date. Currently, pt will continue to benefit from ongoing skilled SLP services targeting both cognitive linguistic skills as well as speech clarity in conversation in attempts to decrease the need for caregiver burden over time.    Eating   Type of Assistance Needed Set-up / clean-up   Physical Assistance Level No physical assistance   Eating CARE Score 5   Swallow Assessment   Swallow Treatment Assessment Bedside Dysphagia Evaluation      Patient Name: Tommie Taylor    Today's Date: 10/30/2024     Problem List  Principal Problem:    CVA (cerebral vascular accident) ([facility])  Active Problems:    Bipolar I disorder, severe, current or most recent episode depressed, with psychotic features ([facility])    GERD (gastroesophageal reflux disease)    Insomnia    DAISY (obstructive sleep apnea)    Anemia, unspecified    Hyperlipidemia    Vitamin D deficiency    Class 3 severe obesity due to excess calories with serious comorbidity and body mass index (BMI) of 40.0 to 44.9 in adult ([facility])    Tardive dyskinesia    Hypertension    DM2 (diabetes mellitus, type 2) ([facility])    Acute kidney injury superimposed on stage 4 chronic kidney disease ([facility])    Encephalopathy    Impaired mobility and activities of daily living    At risk for alteration in bowel function    Overgrown toenails      Past Medical History  Past Medical History:   Diagnosis Date    ADHD, adult residual type     Anxiety     Anxiety     Bipolar 1 disorder ([facility])     Cataract     Left eye    Chronic kidney disease     Colon polyp     CPAP (continuous positive airway pressure) dependence     Depression     Depression     Diabetes mellitus ([facility])     blood sugar 167 on admission    Equinus deformity of foot     GERD (gastroesophageal reflux disease)     Homicidal ideations     Hyperlipidemia     Hypertension     Microalbuminuria      Morbid obesity ([facility])     Neuropathy     Shortness of breath     Sleep apnea     CPAP at bedtime    Sleep apnea     Stroke ([facility])     Suicidal intent        Past Surgical History  Past Surgical History:   Procedure Laterality Date    CATARACT EXTRACTION      CATARACT EXTRACTION      COLONOSCOPY      HAND SURGERY Right     OTHER SURGICAL HISTORY      REPAIR OF SUPERFICIAL WOUND FACE    SD ESOPHAGOGASTRODUODENOSCOPY TRANSORAL DIAGNOSTIC N/A 06/14/2018    Procedure: ESOPHAGOGASTRODUODENOSCOPY (EGD);  Surgeon: Yinka Maier MD;  Location: [facility];  Service: Gastroenterology    SD ESOPHAGOGASTRODUODENOSCOPY TRANSORAL DIAGNOSTIC N/A 09/12/2018    Procedure: EGD AND COLONOSCOPY;  Surgeon: Yinka Maier MD;  Location: [facility];  Service: Gastroenterology         Summary   Pt presented with minimal-functional appearing oral and pharyngeal stage swallowing skills with materials administered today. Symptoms or concerns included minimally slower mastication of solids, slightly decreased bolus formation, minimal delayed oral intiation, suspected decreased control of thins when taking larger sips for liquid wash, oral residue with solids but residue effectively cleared with increased time and lingual sweeps as well as strong cough x1 after larger sip of thins used as liquid wash and pt talking w/ meal.      Risk/s for Aspiration: can be present if not attending to task of meal        Recommendations:  Recommended Diet: regular diet and thin liquids   Recommended Form of Meds: as best tolerates   Swallow strategies: upright posture, only feed when fully alert, slow rate of feeding, small bites/sips, quiet environment (tv off, limit talking, door closed, etc.), and alternating bites and sips, lingual sweeps to R side to clear residual  Other Recommendations: Continue frequent oral care    DISTANT supervision w/ meals-->setup tray for pt.  Consider consult with:  nutrition  Results reviewed with:  carroll and DORY Suero    Aspiration precautions posted.    F/u ST tx/Plan: No further SLP services warranted for dysphagia due to suspecting that pt was distracted during meal w/ engaging in conversation w SLP which lead to cough x1. After this event, provided education and no further aspiration sxs observed. Pt is suspected to be tolerating least restrictive diet w/o increased oropharyngeal or aspiration sxs.          Current Medical Status  Pt is a 59 y.o. male with history of bipolar disorder, CKD stage IV, type 2 diabetes, hypertension, tardive dyskinesia, sleep apnea, GERD who [facility] to the [facility]24 for right-sided weakness and facial droop.  MRI revealed a left corona radiata infarct and neurology was consulted and was administered TNK.  He initially required a Cardene drip for his hypertensive emergency and was eventually placed on dual antiplatelet therapy with plan to continue aspirin and Plavix until that date and continue with aspirin as monotherapy as well as atorvastatin for secondary stroke prophylaxis.  He was recommended for Zio patch placement as an outpatient.  His hospital course was complicated by acute kidney injury on top of his CKD thought to be related to ATN in the setting of uncontrolled blood pressure as well as contrast associated nephropathy.  Nephrology did follow during his course and had some levels of improvement however has not returned back to his baseline level of creatinine.  Additionally there was some levels of encephalopathy which are stable at this time. The patient was evaluated by the Rehabilitation team and deemed an appropriate candidate for comprehensive inpatient rehabilitation and admitted to the [facility]     Allergies:  No known food allergies    Past medical history:  Please see H&P for details    Special Studies:  N/A    Social/Education/Vocational Hx:  Pt lives with girlfriend, Marie    Swallow Information   Current Risks for Dysphagia & Aspiration:  Dysarthria; General Debilitation; New Neuro Event; Brain Injury; Cognitive Deficit; Reduced Alertness; Positioning Issues  Current Symptoms/Concerns: Coughs w/ food and liquid; Difficulty chewing; Pockets food  Current Diet: regular diet and thin liquids   Baseline Diet: regular diet and thin liquids      Baseline Assessment   Behavior/Cognition: alert  Speech/Language Status: able to participate in conversation and able to follow commands  Patient Positioning: upright in recliner  Pain Status/Interventions/Response to Interventions: No report of or nonverbal indications of pain.       Swallow Mechanism Exam  Facial: right facial droop  Labial: decreased ROM right side  Lingual: right sided tongue deviation, decreased ROM, and decreased coordination  Velum: unable to visualize  Mandible: adequate ROM  Dentition: adequate  Vocal quality:clear/adequate   Volitional Cough: strong/productive   Respiratory Status: on RA       Consistencies Assessed and Total Amount Consumed:  Consistencies Administered: thin liquids and hard solids  Materials administered included: thicker turkey, cheese sandwich w/ lettuce and tomato, thins by straw      Oral Stage: minimal to functional  Lip seal around finger foods and straw was functional to where bite-pull/bolus retrieval was functional to where no anterior loss was observed. Mastication was slower w/ solids but overall adequate with the materials administered today.  Bolus formation and transfer were mostly functional to where pt did have some R sided residual but able to clear w/ increased time, use of lingual sweeps. No significant oral pocketing noted.  One occurrence of reduced oral control noted w/ liquids as a wash w/ solids but pt also talking during meal leading to decreased control.    Pharyngeal Stage: minimal to functional  Swallow initiation appeared prompt.  Laryngeal rise was palpated and judged to be within functional limits.  Pt w/ 1 episode of stronger cough due to  liquid wash w/ thins after solids and talking during meal. After this occurrence, pt did not present w/ further coughing, throat clearing, change in vocal quality or respiratory status noted today.     Esophageal Concerns: none reported    Strategies and Efficacy: OOB for good posturing w/ meal, no distractions during meals--> no talking, smaller bites/sips, alternating solids/liquids, clearing R side residual w/ lingual sweeps    Summary and Recommendations (see above)   Swallow Assessment Prognosis   Prognosis Good   Prognosis Considerations Age;Co-morbidities;Medical diagnosis;Medical prognosis;Severity of impairments;New learning ability   SLP Therapy Minutes   SLP Time In 1230   SLP Time Out 1330   SLP Total Time (minutes) 60   SLP Mode of treatment - Individual (minutes) 60   SLP Mode of treatment - Concurrent (minutes) 0   SLP Mode of treatment - Group (minutes) 0   SLP Mode of treatment - Co-treat (minutes) 0   SLP Mode of Treatment - Total time(minutes) 60 minutes   SLP Cumulative Minutes 185   Therapy Time missed   Time missed? No

## 2024-10-30 NOTE — PROGRESS NOTES
10/30/24 1430   Pain Assessment   Pain Assessment Tool 0-10   Pain Score No Pain   Restrictions/Precautions   Precautions Bed/chair alarms;Cognitive;Fall Risk;Supervision on toilet/commode   Cognition   Overall Cognitive Status Impaired   Arousal/Participation Cooperative;Alert   Attention Attends with cues to redirect   Orientation Level Oriented X4   Memory Decreased recall of recent events;Decreased recall of precautions   Following Commands Follows one step commands with increased time or repetition   Subjective   Subjective OOB in chair - awake and ready - denies pain.   Sit to Stand   Type of Assistance Needed Physical assistance   Physical Assistance Level 25% or less   Comment min A   Sit to Stand CARE Score 3   Bed-Chair Transfer   Type of Assistance Needed Physical assistance   Physical Assistance Level 25% or less   Comment min/mod A stand pivot no AD   Chair/Bed-to-Chair Transfer CARE Score 3   Walk 10 Feet   Type of Assistance Needed Physical assistance   Physical Assistance Level 25% or less   Comment HW   Walk 10 Feet CARE Score 3   Walk 50 Feet with Two Turns   Type of Assistance Needed Physical assistance   Physical Assistance Level Total assistance   Comment x2 A with +CF   Walk 50 Feet with Two Turns CARE Score 1   Walk 150 Feet   Type of Assistance Needed Physical assistance   Physical Assistance Level Total assistance   Comment x2 A with +CF   Walk 150 Feet CARE Score 1   Ambulation   Primary Mode of Locomotion Prior to Admission Walk   Distance Walked (feet) 30 ft  (x4 (fwd and back L HR), x100 ft HHA, x150ft HHA, 115ft hands on hips, +gait belt. x15 ft with HW)   Assist Device Hand Hold;Other;Alberto Walker  (hands on hips)   Gait Pattern Trendelenburg;R hemiparesis;Decreased foot clearance;Improper weight shift;Step to;Step through;R foot drag   Limitations Noted In Swing;Strength;Speed;Heel Strike;Endurance;Balance   Provided Assistance with: Weight Shift;Balance;Trunk Support   Walk Assist  Level Minimum Assist;Moderate Assist   Findings Pt did well with HW short distance, does not like to use it. Repeated trials along L HR fwd and back post priming on steps. Pt demonstrated inc R foot clearance and swing, trialed HHA with +CF, pt again with good balance and R swing. Last trial back to room with Hands on hips to A with wt shift. Mainly limited by FTg. however improved tolerance and lara compared to previous days.   Does the patient walk? 2. Yes   Wheel 50 Feet with Two Turns   Type of Assistance Needed Physical assistance   Physical Assistance Level 26%-50%   Comment A for alignment - use of WC for LE reciprocation and endurance.   Wheel 50 Feet with Two Turns CARE Score 3   Wheelchair mobility   Does the patient use a wheelchair? 1. Yes   Type of Wheelchair Used 1. Manual   Method Right lower extremity;Left lower extremity;Left upper extremity   Assistance Provided For Locking Brakes;Remove Leg Rest;Replace Leg Rest;Remove armrests;Replace armrests   Distance Level Surface (feet) 115 ft   Curb or Single Stair   Style negotiated Single stair   Type of Assistance Needed Physical assistance   Physical Assistance Level Total assistance   Comment L HR, x2 A present for safety, generally completed min A x1 with cues for sequence.   1 Step (Curb) CARE Score 1   4 Steps   Type of Assistance Needed Physical assistance   Physical Assistance Level Total assistance   Comment L HR x2 A present for safety - repeated step up/downs at bottom step.   4 Steps CARE Score 1   Therapeutic Interventions   Flexibility seated manual passive B LE DF/PF, R UE stretching for tone reduction - LLLD with 2.5# wrist wt t/o session and with all mobility.   Neuromuscular Re-Education L HR walking repeated trials fwd, back 4x30 ft; on bottom step - L step up R up to 2nd then to floor 2x10; alt taps 2x10.   Assessment   Treatment Assessment Pt seen for 55 min skilled PT intervention with focus on  hip strength and LE coordination to  faciliate reciprocal lara which was highly successful. Pt demonstrated inc swing and amb tolerance with HHA and hands on hip. Continues to be limited by ftg however did more today than in previous sessions. Highly recommend continued efforts to work towards HIGT, and AD selection, pt not a fan of the HW which does revert him to step to lara. Can trial Nustep if time alots, Pt also has 2 curb style steps to enter home, no HR.   Barriers to Discharge Inaccessible home environment;Decreased caregiver support   Barriers to Discharge Comments quesitonable family support at [place].   Plan   Progress Progressing toward goals   PT Therapy Minutes   PT Time In 1430   PT Time Out 1525   PT Total Time (minutes) 55   PT Mode of treatment - Individual (minutes) 55   PT Mode of treatment - Concurrent (minutes) 0   PT Mode of treatment - Group (minutes) 0   PT Mode of treatment - Co-treat (minutes) 0   PT Mode of Treatment - Total time(minutes) 55 minutes   PT Cumulative Minutes 235   Therapy Time missed   Time missed? No

## 2024-10-30 NOTE — PROGRESS NOTES
Progress Note - PMR   Name: Tommie Taylor 59 y.o. male I MRN: [id number]  Unit/Bed#: [facility] 267-01 I Date of Admission: 10/26/2024   Date of Service: 10/30/2024 I Hospital Day: 4     Assessment & Plan  CVA (cerebral vascular accident) (HCC)  Patient with uncontrolled hypertension and diabetes presents with right upper and right lower extremity weakness as well as facial droop  A CT of the head as well as a CTA of the head and neck were completed with negative for acute abnormalities for an acute process  TNK had been administered following the correction of his hypertension after being placed on a Cardene drip  After ministration of the TNK developed worsening dysarthria as well as headache and a repeat CT was still negative.  The 24 post TNK CT was also negative  Neurology followed the patient and MRI was completed and was significant for left corona radiata stroke  Placed on dual antiplatelet therapy for 3 weeks with plans for monotherapy with aspirin and statin indefinitely for secondary stroke prophylaxis  Recommendation for a Zio patch as an outpatient  Follow-up with neurovascular attending in 6 to 8 weeks  Physical, occupational and speech therapy while on the acute rehabilitation unit  Bipolar I disorder, severe, current or most recent episode depressed, with psychotic features ([facility])  History of chronic bipolar 1 disorder and follows with outpatient psychiatry and also was seen inpatient  Mood has been stable and is maintained on Zyprexa, bupropion and trazodone as well as Ingrezza for tardive dyskinesia  Follow-up with psychiatry as an outpatient and consider virtual consultation if indicated for any changes while on ARC  GERD (gastroesophageal reflux disease)  Continue Protonix as well as as needed Tums  Insomnia  Continue trazodone and consider sleep logs  DAISY (obstructive sleep apnea)  Continue CPAP with home settings  Ordered and compliant per records  Anemia, unspecified  Hemoglobin most recently of  10.4 which is up from 9.3 but has been hovering in the 10-11 range for most of admission  Continue to monitor with biweekly CBC or sooner if clinically indicated  Hyperlipidemia  Continue Lipitor 40 mg every evening  Class 3 severe obesity due to excess calories with serious comorbidity and body mass index (BMI) of 40.0 to 44.9 in adult ([facility])  Continue with exercise and promote lifestyle modification, weight loss counseling and management of medical conditions to optimize status  Tardive dyskinesia  Continue Ingrezza 40 mg at bedtime  Hypertension  Presented to the hospital significantly elevated blood pressure with systolic ranging from 197-231  Was placed on a Cardizem drip initially for hypertensive emergency  Was previously on Norvasc 10 mg daily as well as labetalol 100 mg twice daily  Neurology was consulted for BP management and Norvasc was discontinued and started nifedipine and increased as well as a increase to labetalol to 300 mg twice daily  Had been on Demadex 20 mg daily but was on hold due to the increasing creatinine  Goals for normotension  Mgmt per IM  Follow-up with nephrology as an outpatient  DM2 (diabetes mellitus, type 2) ([facility])  Lab Results   Component Value Date    HGBA1C 8.6 (H) 10/16/2024       Recent Labs     10/29/24  1118 10/29/24  1554 10/29/24  2053 10/30/24  0600   POCGLU 127 144* 124 96     Most recent hemoglobin A1c of 8.6 and technically uncontrolled although blood sugars in the hospital have been well-controlled  Currently on semaglutide as an outpatient but was on hold due to ROSINA in the hospital  Continue sliding scale and 4 times daily Accu-Cheks and as well as a diabetic diet    Acute kidney injury superimposed on stage 4 chronic kidney disease ([facility])  Lab Results   Component Value Date    EGFR 17 10/30/2024    EGFR 17 10/28/2024    EGFR 15 10/26/2024    CREATININE 3.63 (H) 10/30/2024    CREATININE 3.55 (H) 10/28/2024    CREATININE 4.04 (H) 10/26/2024     Recent creatinine of  3.63 10/30 Prior baseline around 2.9-3.0  Etiology felt to be related potentially to ATN in the setting of uncontrolled hypertension and complicated by contrast associated nephropathy  Nephrology was consulted and followed and a UA showed microhematuria and proteinuria.  An ultrasound of the kidney/bladder showed wall thickening but no hydronephrosis  Bladder scans negative for retention  Monitor BMP triweekly or sooner if clinically indicated  Demadex has been on hold and had periodically required IV fluids  Nephrology following  Follow with nephrology as an outpatient or sooner on the ARC if any acute changes  Encephalopathy  Had lethargy on exam back on 10/17 in acute care with improvements however more recently had issues with confusion and decreased consciousness in the mornings that improves throughout the day with unclear etiology  Prior history of cognitive impairments with last MoCA score of 18  CT of the head was stable, B12 level (446) wnl  Was evaluated by psychiatry with no changes in medications recommended  EEG showed no epileptiform activity just moderate nonspecific dysfunction  Will need to monitor especially with change in environment now on the ARC for any changes  Impaired mobility and activities of daily living  Patient was evaluated by the rehabilitation team MD and an appropriate candidate for acute inpatient rehabilitation program at this time.  The patient will tolerate 3 hours/day 5 to 7 days/week of intensive physical, occupational and speech therapy in order to obtain goals for community discharge  Due to the patient's functional Compared to their baseline level of function in addition to their ongoing medical needs, the patient would benefit from daily supervision from a rehabilitation physician as well as rehabilitation nursing to implement and adjust the medical as well as functional plan of care in order to meet the patient's goals.  This patient was discussed by the  Interdisciplinary Team in weekly case conference today. The care of the patient was extensively discussed with all care providers and an appropriate rehabilitation plan was formulated unique for this patient. Barriers were identified preventing progression of therapy and appropriate interventions were discussed with each discipline. Please see the team note for input from all disciplines regarding barriers, intervention, and discharge planning.  DC: TBD reteam         At risk for alteration in bowel function  Constipated no bm for several days; lactulose PRN x3 or until BM : had Bmx3 on 10/29  Start colase, senna daily and miralax prn  Titrate as needed   Overgrown toenails  Cs podiatry   Vitamin D deficiency  <7 10/7  Mgmt per IM/nephrology     History of Present Illness   Tommie Taylor is a 59 y.o. male with history of bipolar disorder, CKD stage IV, type 2 diabetes, hypertension, tardive dyskinesia, sleep apnea, GERD who presented to the [facility] on 10/14/2024 for right-sided weakness and facial droop.  MRI revealed a left corona radiata infarct and neurology was consulted and was administered TNK.  He initially required a Cardene drip for his hypertensive emergency and was eventually placed on dual antiplatelet therapy with plan to continue aspirin and Plavix until that date and continue with aspirin as monotherapy as well as atorvastatin for secondary stroke prophylaxis.  He was recommended for Zio patch placement as an outpatient.  His hospital course was complicated by acute kidney injury on top of his CKD thought to be related to ATN in the setting of uncontrolled blood pressure as well as contrast associated nephropathy.  Nephrology did follow during his course and had some levels of improvement however has not returned back to his baseline level of creatinine.  Additionally there was some levels of encephalopathy which are stable at this time. The patient was evaluated by the  Rehabilitation team and deemed an appropriate candidate for comprehensive inpatient rehabilitation and admitted to the [facility] on 10/26/2024  5:13 PM     Rehab Diagnosis: Impairment of mobility, safety, Activities of Daily Living (ADLs), and cognitive/communication skills due to Stroke:  01.2  Right Body Involvement (Left Brain)  Etiologic Dx: Left Corona Radiata Infarct  Date of Onset: 10/14/2024   Date of surgery: N/A  Chief Complaint: f/u stroke    Interval: Patient seen and examined in room. No events overnight.  Reports overall feeling well. Last BM 10/29x3. Sleeping well at night.Denies any pain. Denies any f/c/n/v, CP, SOB, abdominal pain, constipation, or diarrhea.       Objective   Functional Update:  Physical Therapy Occupational Therapy Speech Therapy   Weight Bearing Status: Full Weight Bearing  Transfers: Moderate Assistance  Bed Mobility: Moderate Assistance  Amulation Distance (ft): 10 feet  Ambulation: Moderate Assistance  Assistive Device for Ambulation: Alberto Walker  Wheelchair Mobility Distance: 150 ft  Wheelchair Mobility: Maximum Assistance  Assistive Device for Stairs: Lehft Hand Rail  Stair Assistance: Moderate Assistance  Discharge Recommendations:  (TBD pending support)   Eating: Supervision (KELLY, relying on compensatory tech)  Grooming: Supervision (KELLY, relying on compensatory tech)  Bathing: Moderate Assistance  Bathing: Moderate Assistance  Upper Body Dressing: Moderate Assistance  Lower Body Dressing: Moderate Assistance, Maximum Assistance  Toileting: Maximum Assistance  Toilet Transfer: Moderate Assistance  Cognition: Exceptions to WNL  Cognition: Decreased Memory, Decreased Executive Functions, Decreased Attention, Decreased Comprehension, Decreased Safety, Behavioral Considerations, Impulsive  Orientation: Person, Place   Mode of Communication: Verbal  Speech/Language: Dysarthia  Cognition: Exceptions to WNL  Cognition: Decreased Memory, Decreased Executive Functions, Decreased Attention,  Decreased Comprehension  Orientation: Person, Time, Situation  Discharge Recommendations:  (pending pt progress)         Temp:  [96.1 °F (35.6 °C)-98.4 °F (36.9 °C)] 96.1 °F (35.6 °C)  HR:  [64-80] 74  Resp:  [18] 18  BP: (130-152)/(66-75) 152/71  SpO2:  [97 %-100 %] 98 %    Physical Exam    Gen: No acute distress, obese  HEENT: Moist mucus membranes, Normocephalic/Atraumatic  Cardiovascular: Regular rate, rhythm,  Heme/Extr:bilateral LE edema   Pulmonary: Non-labored breathing.   : No amezquita  GI:  non-distended. BS+  MSK: ROM is WFL in all extremities.   Integumentary: Skin is warm, dry. .  Neuro:R facial droop, dysarthria, R sided weakness, increased tone at elbow. Voluntary 2/5 flexion and 1/5 extension. 1/5 finger flexion   Psych: Normal mood and affect.       Scheduled Meds:  Current Facility-Administered Medications   Medication Dose Route Frequency Provider Last Rate    acetaminophen  650 mg Oral Q6H PRN Alex S George, DO      aspirin  81 mg Oral Daily Alex S George, DO      atorvastatin  40 mg Oral QPM Alex S George, DO      buPROPion  100 mg Oral BID Alex S George, DO      calcium carbonate  1,000 mg Oral BID PRN Alex S George, DO      clopidogrel  75 mg Oral Daily Alex S George, DO      docusate sodium  100 mg Oral BID Cathy Miranda MD      fluticasone  1 spray Each Nare Daily SAMANTHA Kern      heparin (porcine)  7,500 Units Subcutaneous Q8H MIGEL Alex S George, DO      hydrALAZINE  25 mg Oral Q8H MIGEL Alex S George, DO      insulin lispro  1-5 Units Subcutaneous HS Alex S George, DO      insulin lispro  1-6 Units Subcutaneous TID AC Alex S George, DO      labetalol  300 mg Oral BID Alex S George, DO      lactulose  20 g Oral TID PRN Cathy Miranda MD      loratadine  5 mg Oral Daily SAMANTHA Kern      NIFEdipine  90 mg Oral Daily Alex S George, DO      OLANZapine  10 mg Oral HS Alex S George, DO      ondansetron  4 mg Oral Q6H PRN SAMANTHA Kern      pantoprazole  40  mg Oral Early Morning Alex George DO      polyethylene glycol  17 g Oral Daily Cathy Miranda MD      senna  2 tablet Oral HS Cathy Miranda MD      traZODone  50 mg Oral HS PRN Alex George DO      Valbenazine Tosylate  40 mg Oral HS Alex George DO           Lab Results: I have reviewed the following results:  Results from last 7 days   Lab Units 10/30/24  0601 10/28/24  1022   HEMOGLOBIN g/dL 9.4* 9.8*   HEMATOCRIT % 27.5* 30.0*   WBC Thousand/uL 7.66 6.45   PLATELETS Thousands/uL 192 190     Results from last 7 days   Lab Units 10/30/24  0601 10/28/24  0501 10/26/24  0842   BUN mg/dL 32* 32* 36*   SODIUM mmol/L 138 139 140   POTASSIUM mmol/L 4.6 3.8 3.8   CHLORIDE mmol/L 109* 108 111*   CREATININE mg/dL 3.63* 3.55* 4.04*              Cathy Miranda MD   Physical Medicine and Rehabilitation   10/30/24    I have spent a total time of 48 minutes in caring for this patient on the day of the visit/encounter including Patient and family education, Counseling / Coordination of care, Documenting in the medical record, and Communicating with other healthcare professionals .  This patient was discussed by the Interdisciplinary Team in weekly case conference today. The care of the patient was extensively discussed with all care providers and an appropriate rehabilitation plan was formulated unique for this patient. Barriers were identified preventing progression of therapy and appropriate interventions were discussed with each discipline. Please see the team note for input from all disciplines regarding barriers, intervention, and discharge planning.

## 2024-10-30 NOTE — TEAM CONFERENCE
Acute RehabilitationTeam Conference Note  Date: 10/30/2024   Time: 10:32 AM       Patient Name:  Tommie Taylor       Medical Record Number: [id number]   YOB: 1965  Sex: Male          Room/Bed:  Banner 267/Banner 267-01  Payor Info:  Payor: MEDICARE / Plan: MEDICARE A AND B / Product Type: Medicare A & B Fee for Service /      Admitting Diagnosis: CVA (cerebral vascular accident) ([facility]) [I63.9]   Admit Date/Time:  10/26/2024  5:13 PM  Admission Comments: No comment available     Primary Diagnosis:  CVA (cerebral vascular accident) ([facility])  Principal Problem: CVA (cerebral vascular accident) ([facility])    Patient Active Problem List    Diagnosis Date Noted    At risk for alteration in bowel function 10/28/2024    Overgrown toenails 10/28/2024    Impaired mobility and activities of daily living 10/26/2024    Stroke-like symptoms 10/22/2024    Acute kidney injury superimposed on stage 4 chronic kidney disease ([facility]) 10/17/2024    Encephalopathy 10/17/2024    Volume overload 10/17/2024    Acid-base disorder, mixed 10/17/2024    CVA (cerebral vascular accident) ([facility]) 10/15/2024    Type 2 diabetes mellitus with stage 4 chronic kidney disease and hypertension ([facility]) 10/08/2024    Vision decreased 06/05/2024    Memory deficit 04/15/2024    Tremor 04/15/2024    B12 deficiency 04/15/2024    Hypertension 11/27/2023    Tardive dyskinesia 08/16/2023    Lightheaded 08/02/2023    Loss of appetite 08/02/2023    Unintended weight loss 08/02/2023    Class 3 severe obesity due to excess calories with serious comorbidity and body mass index (BMI) of 40.0 to 44.9 in adult ([facility]) 03/01/2023    Vitamin D deficiency 04/09/2021    Hyperlipidemia 02/11/2020    Toenail deformity 11/01/2019    Persistent proteinuria 08/28/2019    Anemia, unspecified 08/28/2019    Elevated serum creatinine 04/17/2019    Stage 4 chronic kidney disease (HCC) 04/17/2019    DAISY (obstructive sleep apnea)     Generalized anxiety disorder 06/19/2018    Hiatal hernia  06/17/2018    Lower esophageal ring ([facility]) 06/17/2018    Insomnia 05/14/2018    Bipolar I disorder, severe, current or most recent episode depressed, with psychotic features ([facility]) 11/06/2017    Panic attacks 11/06/2017    GERD (gastroesophageal reflux disease) 09/05/2017    Flat foot 05/03/2016    Diabetic polyneuropathy ([facility]) 12/11/2014    DM2 (diabetes mellitus, type 2) ([facility]) 12/11/2014    Allergic rhinitis 08/31/2012       Physical Therapy:    Weight Bearing Status: Full Weight Bearing  Transfers: Moderate Assistance  Bed Mobility: Moderate Assistance  Amulation Distance (ft): 10 feet  Ambulation: Moderate Assistance  Assistive Device for Ambulation: Alberto Walker  Wheelchair Mobility Distance: 150 ft  Wheelchair Mobility: Maximum Assistance  Assistive Device for Stairs: Lehft Hand Rail  Stair Assistance: Moderate Assistance  Discharge Recommendations:  (TBD pending support)    60 y/o male pt who presented [facility] on 10/14/2024 via EMS as prehospital stroke alert d/t fall following episode of dizziness and sudden onset of right sided weakness. He was administered TNK after BP was controlled. Following TNK, patient developed worsening dysarthria and headache. Repeat CTH was unremarkable. MRI of brain showed acute left corona radiata infarct. ECHO showed EF 70%, moderate concentric hypertrophy, grade 2 diastolic dysfunction, mild AVR. At baseline pt was I without AD lives with disabled spouse uses power chair and HD, 2SH with 1+1 ASHOK front door. Can have first floor setup if needed. Questionable support at DC.     10/29/24  Pt with hypertonic R UE, R hemiparesis limiting strength, balance, and ambulation. Slow progress with amb limited by Ftg and R LE weakness. Pt to greatly benefit from continued skilled PT intervention to maximize post stroke recovery, questionable family support at DC, will need to establish what support pt has to unsure safe DC home when physically appropriate. Reteam.      Occupational Therapy:  Eating: Supervision (KELLY, relying on compensatory tech)  Grooming: Supervision (KELLY, relying on compensatory tech)  Bathing: Moderate Assistance  Bathing: Moderate Assistance  Upper Body Dressing: Moderate Assistance  Lower Body Dressing: Moderate Assistance, Maximum Assistance  Toileting: Maximum Assistance  Toilet Transfer: Moderate Assistance  Cognition: Exceptions to WNL  Cognition: Decreased Memory, Decreased Executive Functions, Decreased Attention, Decreased Comprehension, Decreased Safety, Behavioral Considerations, Impulsive  Orientation: Person, Place  Discharge Recommendations: Home with:  DC Home with:: 24 Hour Supervision, 24 Hour Assistance, Family Support, First Floor Setup, Outpatient Occupational Therapy (pending family able to assist if needed, may need to consider subacute)       10/29/24  59 y.o male admitted with chief complaint of dizziness and sudden onset right sided weakness.MRI brain showed acute left corona radiata infarct. Pt has a past medical history significant for: ADHD, anxiety, bipolar disorder, CKD4, CVA, tardive dyskinesia, depression, DM, GERD, HLD, HTN, neuropathy, sleep apnea with CPAP. PRECAUTIONS: fall risk, impulsive. Pt presents with the following impairments: balance, strength, endurance, cognition, impaired tone, motor control. Pt would benefit from skilled occupational therapy services with focus on ADL retraining, strength, balance, functional transfers, NMR and cognition to ensure safe discharge and participation in functional activities to increase independence. Unclear if family will be equipped to assist pt at home safely. Pt overall requiring mod-maxA for basic ADL, not yet ambulatory w/ ADL routine. Anticipate 17 to 21 day ELOS to meet supervision level goals.     Speech Therapy:  Mode of Communication: Verbal  Speech/Language: Dysarthia  Cognition: Exceptions to WNL  Cognition: Decreased Memory, Decreased Executive Functions, Decreased  Attention, Decreased Comprehension  Orientation: Person, Time, Situation  Discharge Recommendations:  (pending pt progress)  Pt currently being followed for cognitive and speech tx sessions. In regard to cognitive skills, pt was able to complete formalized cognitive assessment, CLQT+ on initial evaluation with a Composite Severity Rating score of 1.2 out of 4.0, correlating to overall severely impaired cognitive linguistic impairments at time of evaluation and in comparison to age matched peers ranging from 18-68 y/o. Cognitive barriers which present include: decreased attention, ST memory recall , problem solving, reasoning, sequencing, organization of thoughts, judgement, slower processing, insight, and as well as fatigue, which still impacts pt's overall safety, functional cognitive communication skills as well as functional mobility. The following interventions are used to target these barriers, including verbal problem solving task, verbal working memory tasks, visual memory recall tasks, drawing conclusions activities, written sequencing tasks, verbal sequencing tasks, categorization tasks , picture problem solving activities, verbal reasoning tasks, verbal review of current medications,  written health management tasks, verbal health management tasks, functional reading tasks , and family education/training.     Additionally, pt completed Motor Speech Evaluation, in which pt is demonstrating speech intelligibility to be moderate-severely impaired at the sentence and conversational level. Pt elicited the following errors imprecise articulation, distorted speech sounds, decreased breath support for speech and fast rate of speech. Pt noted to have decrease production and sentence and conversation level over time in evaluation with pt getting more fatigued. Pt reports insight with decrease intelligibility. Speech barriers which present include: decreased intelligibility decreased at word, sentence and  conversational level, imprecise articulation , and faster rate of speech which still impacts pt's overall safety, functional cognitive communication skills as well as functional mobility.  The following interventions are used to target these barriers, including review of speech strategies for carryover in conversation, OME review and speech drills.     Current level of functioning:   Comprehension: min A  Expression: mod-max A  Social Interaction: min A  Executive functions: mod A   Memory: mod A      This week, focus for continued services to target review of OME's, speech strategies, use of speech drills to maximize speech intelligibility. Also will initiate targeting cognitive skills targeting ST recall, problem solving, organization of thoughts, sequencing, as well as maximizing more insight to stroke education and prevention. Family training/education has not been initiated with pt's family but will be needed throughout pt's stay and in preparation for discharge. Recommendations at time of discharge are for continued skilled SLP services but pending progress to determine home vs OP.    At this time, pt will continue to benefit from skilled cognitive linguistic tx and speech/apraxia tx session to maximize overall functional independence given overall cognitive linguistic skills and speech and intelligibility in attempts to decreased caregiver burden over time.         Nursing Notes:  Appetite: Good  Diet Type: Diabetic, Thin Liquids                                                                     Pain Location/Orientation: Location: Head  Pain Score: 0                                  Tommie Taylor is a 59 y.o. male with history of bipolar disorder, CKD stage IV, type 2 diabetes, hypertension, tardive dyskinesia, sleep apnea, GERD who presented to the [facility] on 10/14/2024 for right-sided weakness and facial droop.  MRI revealed a left corona radiata infarct and neurology was  consulted and was administered TNK.  He initially required a Cardene drip for his hypertensive emergency and was eventually placed on dual antiplatelet therapy with plan to continue aspirin and Plavix until that date and continue with aspirin as monotherapy as well as atorvastatin for secondary stroke prophylaxis.  He was recommended for Zio patch placement as an outpatient.  His hospital course was complicated by acute kidney injury on top of his CKD thought to be related to ATN in the setting of uncontrolled blood pressure as well as contrast associated nephropathy.  Nephrology did follow during his course and had some levels of improvement however has not returned back to his baseline level of creatinine.  Additionally there was some levels of encephalopathy which are stable at this time. The patient was evaluated by the Rehabilitation team and deemed an appropriate candidate for comprehensive inpatient rehabilitation and admitted to the [facility] on 10/26/2024  5:13 PM      Pain managed with tylenol 650mg PRN Q6h. DVT prophylaxis heparin 7,500 Q8h. CVA managed with ASA, 81 mg daily, lipitor 40 mg daily, plavix 75 mg daily, heparin. Recommendation for a Zio patch as an outpatient. DM managed with sliding scale and 4 times daily Accu-Cheks and as well as a diabetic diet. HTN managed with hydralazine 25 mg Q8h, labetalol 300 mg BID, and nifedipine 90 mg daily. Tardive dyskinesia managed with Valbenazine Tosylate 40 mg daily at bedtime. HLD managed with lipitor 40 mg every evening. DAISY managed with CPAP. Insomnia managed with trazadone 50 mg PRN at bedtime. GERD managed with protonix 40 mg daily and PRN TUMS. Bipolar I disorder managed with Zyprexa 10 mg daily at bedtime and bupropion 100 mg BID. PRN Mirlax 17 g packet for constipation.      This week we will monitor vital signs and lab values. We will keep patient safe from falls by continuing with hourly rounding, maintaining bed/chair alarms, and keeping call bell  within reach. We will educate patient on importance of turning and repositioning to off load pressure to prevent skin break down. We will manage pain so that patient may perform optimally in therapy sessions. We will teach patient energy conservation and promote independence of ADLs.     Case Management:     Discharge Planning  Living Arrangements: Lives w/ Spouse/significant other, Lives w/ Family members  Support Systems: Self, Spouse/significant other, Family members  Assistance Needed: to be determined  Type of Current Residence: Private residence  Current Home Care Services: No  Patient admitted to [facility] on 10/26/24 after inpatient hospital stay for stroke. Patient lives with wife in 2SH with 1STE full flight of stairs to second floor. Full bath with walk in shower on the first floor. Patient uses CPAP at HS    Is the patient actively participating in therapies? yes  List any modifications to the treatment plan:     Barriers Interventions   HTN, DAISY, CVA HTN emergency C Pap, medication management, nephrology consult   Right hemiparesis, increased on RUE>RLE, right hip weakness  Steps to enter home   Therapeutic exercises,strengthening exercises, stair training, E Stim, positioning, estefania dressing trechniques   ?Family support Wife with HD, family with poor health literacy. Family training   Cognitive deficits, dysarthria ,lethargy Stroke education, speech strategies, oral motor exercises,    Balance, decreased motivation Device training, graded functional tasks, encourage movement.         Is the patient making expected progress toward goals? yes    Medical Goals: Patient will be stable for discharge to [facility] [place] upon completion of rehab program and Patient will be able to manage medical conditions and comorbid conditions with medications and follow up upon completion of rehab program    Weekly Team Goals:   PT:S  mobility, I wheelchair goals  OT:S medication, partial bathing and dressing(  still unsure of physical family support)  SLP: min A cognition and speech    Rehab Team Goals  ADL Team Goal: Patient will require supervision with ADLs with least restrictive device upon completion of rehab program  Transfer Team Goal: Patient will require supervision with transfers with least restrictive device upon completion of rehab program  Locomotion Team Goal: Patient will require supervision with locomotion with least restrictive device upon completion of rehab program  Cognitive Team Goal: Patient will be independent for basic  tasks and require supervision for complex tasks upon completion of rehab program    Discussion: PT: Mod A transfers amb 10 feet, stand pivot, mod/max self propulsion in wheelchair,min A bed mobility  OT:Mod A bathing and dressing, min A toileting  SLP:Mod/max speech and cognition    Anticipated Discharge Date:  reteam  [facility] vs snf at this time    [facility] Team Members Present:The following team members are supervising care for this patient and were present during this Weekly Team Conference.    Physician: Dr. Ruben MD  : Sol Kumari RN  Registered Nurse: Tereza Artis RN  Physical Therapist: Ciara Antony PT  Occupational Therapist: Luanne Sidhu MS, OTR/L  Speech Therapist: Bettina Unger MA, CCC-SLP

## 2024-10-30 NOTE — ASSESSMENT & PLAN NOTE
Lab Results   Component Value Date    HGBA1C 8.6 (H) 10/16/2024       Recent Labs     10/29/24  1554 10/29/24  2053 10/30/24  0600 10/30/24  1117   POCGLU 144* 124 96 123       Blood Sugar Average: Last 72 hrs:  (P) [id number]    Continue insulin management with sliding scale.  Pt had been using ozempic for home glucose management but ran out and did not have any other glycemic control agents.

## 2024-10-30 NOTE — ASSESSMENT & PLAN NOTE
Lab Results   Component Value Date    HGBA1C 8.6 (H) 10/16/2024       Recent Labs     10/29/24  1118 10/29/24  1554 10/29/24  2053 10/30/24  0600   POCGLU 127 144* 124 96     Most recent hemoglobin A1c of 8.6 and technically uncontrolled although blood sugars in the hospital have been well-controlled  Currently on semaglutide as an outpatient but was on hold due to ROSINA in the hospital  Continue sliding scale and 4 times daily Accu-Cheks and as well as a diabetic diet

## 2024-10-30 NOTE — ASSESSMENT & PLAN NOTE
Hemoglobin at admission 12.1.   10/21 HGB 10.4  10/28 HGB 9.8  Most recent iron panel on 10/15: Iron Sat 38%, TIBC 204, Iron 77, and Ferritin 111.  No need for iron supplementation at this time.  FOBT collected yesterday and negative  Continue to trend routine CBC.

## 2024-10-30 NOTE — PCC CARE MANAGEMENT
Patient admitted to [facility] on 10/26/24 after inpatient hospital stay for stroke. Patient lives with wife in 2SH with 1STE full flight of stairs to second floor. Full bath with walk in shower on the first floor. Patient uses CPAP at HS.  Patient restless at times. [facility] vs SNF    11/12- Pt to dc Monday 11/18 with home PT OT RN RAZ, added to DCI. Transfer bench ordered for shower, michelle stark judy also provided.

## 2024-10-30 NOTE — PLAN OF CARE
Problem: PAIN - ADULT  Goal: Verbalizes/displays adequate comfort level or baseline comfort level  Description: Interventions:  - Encourage patient to monitor pain and request assistance  - Assess pain using appropriate pain scale  - Administer analgesics based on type and severity of pain and evaluate response  - Implement non-pharmacological measures as appropriate and evaluate response  - Consider cultural and social influences on pain and pain management  - Notify physician/advanced practitioner if interventions unsuccessful or patient reports new pain  Outcome: Progressing     Problem: NEUROSENSORY - ADULT  Goal: Achieves stable or improved neurological status  Description: INTERVENTIONS  - Monitor and report changes in neurological status  - Monitor vital signs such as temperature, blood pressure, glucose, and any other labs ordered   - Initiate measures to prevent increased intracranial pressure  - Monitor for seizure activity and implement precautions if appropriate      Outcome: Progressing
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
## 2024-10-31 LAB
GLUCOSE SERPL-MCNC: 107 MG/DL (ref 65–140)
GLUCOSE SERPL-MCNC: 111 MG/DL (ref 65–140)
GLUCOSE SERPL-MCNC: 154 MG/DL (ref 65–140)
GLUCOSE SERPL-MCNC: 91 MG/DL (ref 65–140)

## 2024-10-31 PROCEDURE — 97116 GAIT TRAINING THERAPY: CPT

## 2024-10-31 PROCEDURE — 92507 TX SP LANG VOICE COMM INDIV: CPT

## 2024-10-31 PROCEDURE — 97112 NEUROMUSCULAR REEDUCATION: CPT

## 2024-10-31 PROCEDURE — 99232 SBSQ HOSP IP/OBS MODERATE 35: CPT | Performed by: INTERNAL MEDICINE

## 2024-10-31 PROCEDURE — 82948 REAGENT STRIP/BLOOD GLUCOSE: CPT

## 2024-10-31 PROCEDURE — 97535 SELF CARE MNGMENT TRAINING: CPT

## 2024-10-31 RX ADMIN — ATORVASTATIN CALCIUM 40 MG: 40 TABLET, FILM COATED ORAL at 18:07

## 2024-10-31 RX ADMIN — HYDRALAZINE HYDROCHLORIDE 25 MG: 25 TABLET ORAL at 13:24

## 2024-10-31 RX ADMIN — FLUTICASONE PROPIONATE 1 SPRAY: 50 SPRAY, METERED NASAL at 09:42

## 2024-10-31 RX ADMIN — LORATADINE 5 MG: 10 TABLET ORAL at 09:40

## 2024-10-31 RX ADMIN — DOCUSATE SODIUM 100 MG: 100 CAPSULE, LIQUID FILLED ORAL at 09:40

## 2024-10-31 RX ADMIN — HYDRALAZINE HYDROCHLORIDE 25 MG: 25 TABLET ORAL at 21:51

## 2024-10-31 RX ADMIN — VALBENAZINE 40 MG: 40 CAPSULE ORAL at 21:56

## 2024-10-31 RX ADMIN — INSULIN LISPRO 1 UNITS: 100 INJECTION, SOLUTION INTRAVENOUS; SUBCUTANEOUS at 09:42

## 2024-10-31 RX ADMIN — SENNOSIDES 17.2 MG: 8.6 TABLET, FILM COATED ORAL at 21:50

## 2024-10-31 RX ADMIN — POLYETHYLENE GLYCOL 3350 17 G: 17 POWDER, FOR SOLUTION ORAL at 09:40

## 2024-10-31 RX ADMIN — LABETALOL HYDROCHLORIDE 300 MG: 100 TABLET, FILM COATED ORAL at 09:40

## 2024-10-31 RX ADMIN — HEPARIN SODIUM 7500 UNITS: 5000 INJECTION INTRAVENOUS; SUBCUTANEOUS at 06:21

## 2024-10-31 RX ADMIN — HEPARIN SODIUM 7500 UNITS: 5000 INJECTION INTRAVENOUS; SUBCUTANEOUS at 13:24

## 2024-10-31 RX ADMIN — PANTOPRAZOLE SODIUM 40 MG: 40 TABLET, DELAYED RELEASE ORAL at 06:21

## 2024-10-31 RX ADMIN — CLOPIDOGREL BISULFATE 75 MG: 75 TABLET ORAL at 09:40

## 2024-10-31 RX ADMIN — BUPROPION HYDROCHLORIDE 100 MG: 100 TABLET, FILM COATED ORAL at 18:07

## 2024-10-31 RX ADMIN — OLANZAPINE 10 MG: 2.5 TABLET, FILM COATED ORAL at 21:50

## 2024-10-31 RX ADMIN — NIFEDIPINE 90 MG: 30 TABLET, FILM COATED, EXTENDED RELEASE ORAL at 09:40

## 2024-10-31 RX ADMIN — DOCUSATE SODIUM 100 MG: 100 CAPSULE, LIQUID FILLED ORAL at 18:07

## 2024-10-31 RX ADMIN — HYDRALAZINE HYDROCHLORIDE 25 MG: 25 TABLET ORAL at 06:22

## 2024-10-31 RX ADMIN — ASPIRIN 81 MG: 81 TABLET, COATED ORAL at 09:40

## 2024-10-31 RX ADMIN — HEPARIN SODIUM 7500 UNITS: 5000 INJECTION INTRAVENOUS; SUBCUTANEOUS at 21:51

## 2024-10-31 RX ADMIN — LABETALOL HYDROCHLORIDE 300 MG: 100 TABLET, FILM COATED ORAL at 21:50

## 2024-10-31 RX ADMIN — BUPROPION HYDROCHLORIDE 100 MG: 100 TABLET, FILM COATED ORAL at 09:40

## 2024-10-31 NOTE — PROGRESS NOTES
10/31/24 1030   Pain Assessment   Pain Assessment Tool 0-10   Pain Score No Pain   Restrictions/Precautions   Precautions Bed/chair alarms;Fall Risk;Impulsive;Supervision on toilet/commode;Visual deficit   Eating   Type of Assistance Needed Set-up / clean-up   Physical Assistance Level No physical assistance   Comment assist to open packages   Eating CARE Score 5   Oral Hygiene   Type of Assistance Needed Physical assistance   Physical Assistance Level 25% or less   Comment pt preformed standing with min A to maintain balance. Pt with poor awareness of balance while dual tasking.   Oral Hygiene CARE Score 3   Shower/Bathe Self   Type of Assistance Needed Physical assistance   Physical Assistance Level 26%-50%   Comment Pt completed sponge bathe at sink today. Pt washed 7/10 body parts. Pt required assist to wash LUE and assist to wash mc/buttock in stance. Therapist assisted in washing under stomach fold and thoroughly drying. Pt maintained balance at min A level. Recommend attempt to tub/bench to assess patients safety and potentially try shower upon next session.   Shower/Bathe Self CARE Score 3   Bathing   Assessed Bath Style Sponge Bath   Tub/Shower Transfer   Reason Not Assessed Sponge Bath   Findings please assess next session for dry shower transfer to assess sitting balance on tub bench. Then trial shower upon next ADL pending safety.   Upper Body Dressing   Type of Assistance Needed Physical assistance   Physical Assistance Level 26%-50%   Comment Pt with poor problem solving for dressing without extnernal cues from therapist. Pt required assist to thread RUE into shirt. Pt then attempting to thread LUE however unable to get over head. With cueing from therapist, patient modified technique and threaded head through shift prior to threading LUE. Pt required assist to pull shirt down on right side.   Upper Body Dressing CARE Score 3   Lower Body Dressing   Type of Assistance Needed Physical assistance    Physical Assistance Level 26%-50%   Comment Pt required assist to thread RLE into sweat pants. Pt able to thread LLE into pants but required mod A for balance support to pull pants up. Pt reuqired therapist assist to get pants over right hip.   Lower Body Dressing CARE Score 3   Putting On/Taking Off Footwear   Type of Assistance Needed Physical assistance   Physical Assistance Level 51%-75%   Comment Pt reuqired assist to doff R sock. Pt able to doff L sock without assistance. Pt fatigued and unable to don b/l socks   Putting On/Taking Off Footwear CARE Score 2   Roll Left and Right   Type of Assistance Needed Physical assistance   Physical Assistance Level No physical assistance   Comment CGA for safety   Roll Left and Right CARE Score -   Sit to Stand   Type of Assistance Needed Physical assistance   Physical Assistance Level 25% or less   Comment min A with hemiwalker.   Sit to Stand CARE Score 3   Bed-Chair Transfer   Type of Assistance Needed Physical assistance   Physical Assistance Level 25% or less   Comment min A stand pivot with hemiwalker   Chair/Bed-to-Chair Transfer CARE Score 3   Neuromuscular Education   Comments Biphasiac estim to tricep and and supraspinatus and deltoid to promote shoulder abduction. Pt tolerated biphasic setting at 25 mga FOR 20 minutes. During exercise focused on tablepush ups to promote shoulder stability and tricep activation..   Cognition   Overall Cognitive Status Impaired   Arousal/Participation Alert;Cooperative   Attention Attends with cues to redirect   Orientation Level Oriented to person;Oriented to situation;Oriented to time   Memory Decreased recall of biographical information;Decreased short term memory   Following Commands Follows one step commands with increased time or repetition   Vision   Vision Comments (S)  pt may benefit from visual screen   Activity Tolerance   Activity Tolerance Patient tolerated treatment well   Assessment   Treatment Assessment Pt  engaged in 90 minute OT session with focus on ADL routine and annie re-ed of RUE. Pt continues to be impulsive with quick movements to reach for items in both foward and lateral reach. Pt had fall last night he slid out of recliner. Trialed a different recliner pt with feet on ground but he still leans laterally and is constantly shifting in chair keeping him at high risk for falls. May benefit from moving closer to nurses station to allow more visabilitlty. Pt continues with hypertonicity in pectoralis muscle of RUE which does improve post estim and PROM. Continue iwht POC with focus on dynamic sitting/standing balance and nuero-anita of RUE.   Problem List Decreased strength;Decreased range of motion;Decreased endurance;Impaired balance;Decreased mobility;Impaired vision;Impaired tone   OT Therapy Minutes   OT Time In 1030   OT Time Out 1200   OT Total Time (minutes) 90   OT Mode of treatment - Individual (minutes) 90   OT Mode of treatment - Concurrent (minutes) 0   OT Mode of treatment - Group (minutes) 0   OT Mode of treatment - Co-treat (minutes) 0   OT Mode of Treatment - Total time(minutes) 90 minutes   OT Cumulative Minutes 450   Therapy Time missed   Time missed? No

## 2024-10-31 NOTE — PROGRESS NOTES
10/31/24 0900   Pain Assessment   Pain Assessment Tool 0-10   Pain Score No Pain   Comprehension   Comprehension (FIM) 3 - Needs parts of sentences repeated   Expression   Expression (FIM) 3 - Needs to repeat parts of sentences   Social Interaction   Social Interaction (FIM) 5 - Interacts appropriately with others 90% of time   Problem Solving   Problem solving (FIM) 2 - Needs direction more than ½ time to initiate, plan or complete simple tasks   Memory   Memory (FIM) 2 - Recognizes, recalls/performs 25-49%   Speech/Language/Cognition Assessmetn   Treatment Assessment Pt lying in bed upon SLP entering, agreeable to shifting up in bed and being positioned into chair mode for ST session. While building rapport, pt with poor speech intelligibility, thus initially SLP reviewing clear speech strategies. SLP first prompted pt to recall any to assess his memory and for carryover purposes. He could not recall, therefore SLP provided pt with his glasses and the list of four strategies that were provided to him in a prior ST session. He read each aloud, while SLP demonstrated use v. When not used and the contrasts between them. SLP discussed reasons for using after reviewing all, encouraging pt to apply during conversations with medical team, visitors, or on the phone to reduce requests for repetition. Pt expressing agreement however suspect would benefit from additional review. SLP then assessing pt's completion of OMEs that were reviewed in yesterday's ST session to assess his ability to complete accurately and offer assistance/modeling when needed. He required modA via modeling across lip, face/cheek, and tongue exercises. This did not appear to be due to muscle weakness, rather comprehension of the written and verbal instruction. SLP offered reasoning for each set of exercises, again relating to speech clarity. Patient completed each exercise x10 and was directed to complete an additional round of each exercise this  afternoon or evening, and then 2x each day following.Time did not allow for speech drills to be initiated, but suspect pt is ready to initiate in subsequent sessions once additional review of strategies and exercises takes place and he is demonstrating competency and carryover. Patient is showing continued need for ST services while at the [facility] and is showing benefit to the skilled modeling and modA cues provided to increase his speech intelligibility.   SLP Therapy Minutes   SLP Time In 0900   SLP Time Out 0930   SLP Total Time (minutes) 30   SLP Mode of treatment - Individual (minutes) 30   SLP Mode of treatment - Concurrent (minutes) 0   SLP Mode of treatment - Group (minutes) 0   SLP Mode of treatment - Co-treat (minutes) 0   SLP Mode of Treatment - Total time(minutes) 30 minutes   SLP Cumulative Minutes 30   Therapy Time missed   Time missed? No

## 2024-10-31 NOTE — ASSESSMENT & PLAN NOTE
Patient was evaluated by the rehabilitation team MD and an appropriate candidate for acute inpatient rehabilitation program at this time.  The patient will tolerate 3 hours/day 5 to 7 days/week of intensive physical, occupational and speech therapy in order to obtain goals for community discharge  Due to the patient's functional Compared to their baseline level of function in addition to their ongoing medical needs, the patient would benefit from daily supervision from a rehabilitation physician as well as rehabilitation nursing to implement and adjust the medical as well as functional plan of care in order to meet the patient's goals.  DC: ELLE reteam

## 2024-10-31 NOTE — ASSESSMENT & PLAN NOTE
Lab Results   Component Value Date    EGFR 17 10/30/2024    EGFR 17 10/28/2024    EGFR 15 10/26/2024    CREATININE 3.63 (H) 10/30/2024    CREATININE 3.55 (H) 10/28/2024    CREATININE 4.04 (H) 10/26/2024     Creatinine currently 3.63.  Baseline creatinine 2.5-2.9.    Avoid nephrotoxins.  Home diuretics currently on hold.  Encourage hydration.  Renal ultrasound demonstrates no hydro but mild bladder wall thickening.   Nephrology following.  Labs MWF per Nephro.  Follows with Dr. No (Nephrology) as outpatient.

## 2024-10-31 NOTE — ASSESSMENT & PLAN NOTE
Most recent hemoglobin stable at 9.4 g/dL  Maintain hemoglobin greater than 8 g/dL  Continue to monitor hemoglobin for now  Check CBC on Monday

## 2024-10-31 NOTE — PROGRESS NOTES
10/31/24 1430   Pain Assessment   Pain Assessment Tool 0-10   Pain Score No Pain   Restrictions/Precautions   Precautions Bed/chair alarms;Fall Risk;Supervision on toilet/commode   Sit to Stand   Type of Assistance Needed Physical assistance   Physical Assistance Level 26%-50%   Comment Mod A on initial entry and progressed to more CG/ Min A - no device   Sit to Stand CARE Score 3   Bed-Chair Transfer   Type of Assistance Needed Physical assistance   Physical Assistance Level 26%-50%   Comment Min/ Mod A no device-  more difficulty going to right   Chair/Bed-to-Chair Transfer CARE Score 3   Walk 10 Feet   Type of Assistance Needed Physical assistance   Physical Assistance Level 26%-50%   Comment Mod A to start and progressed to Min - no device   Walk 10 Feet CARE Score 3   Walk 50 Feet with Two Turns   Type of Assistance Needed Physical assistance   Physical Assistance Level 25% or less   Comment Mod A to start and progressed to Min - no device   Walk 50 Feet with Two Turns CARE Score 3   Walk 150 Feet   Type of Assistance Needed Physical assistance   Physical Assistance Level 25% or less   Comment Mod A to start and progressed to Min - no device   Walk 150 Feet CARE Score 3   Ambulation   Primary Mode of Locomotion Prior to Admission Walk   Distance Walked (feet) 150 ft  (x4)   Gait Pattern Slow Lesley;Decreased foot clearance;R hemiparesis;Trendelenburg;Improper weight shift   Provided Assistance with: Balance;Direction   Walk Assist Level Minimum Assist   Findings Min A for balance - excessive lateral lean and started very very slow-  showed improved neuro planning with repetition and cues for faster walking for NPP-  much improved pace by end of session   Does the patient walk? 2. Yes   Curb or Single Stair   Style negotiated Single stair   Type of Assistance Needed Physical assistance   Physical Assistance Level 26%-50%   Comment Single HR- stepped up with either leg (has the power ) but heavy on arm use,  also poor foot clearacne on R   1 Step (Curb) CARE Score 3   4 Steps   Type of Assistance Needed Physical assistance   Physical Assistance Level 26%-50%   Comment single rail, used step to pattern - Mod A for balance and R foot catch going up/ and down   4 Steps CARE Score 3   12 Steps   Reason if not Attempted Safety concerns   12 Steps CARE Score 88   Stairs   Type Stairs   # of Steps 6   Assist Devices Single Rail   Therapeutic Interventions   Neuromuscular Re-Education 2 sets to fatigue of step throughs with foot floor to bottom step (needs to work on wt shift to left and R foot clearance,  also 2 sets to fatigue for step up and throughs both used single HR encouraged lighter touch to focus on balance   Other Set pt up in tilt in space to have lean back in safer way because pt had a fall and tends to slide in recliner - alarm placed   Equipment Use   NuStep Performed for Neuro prime  6mins  SPM 65  used Hand assist,  pt reported 5/10 cathi   Other Comments   Comments BP stayed around 140/80s  t/o session   Assessment   Treatment Assessment 60 min skilled PT session performed stroke rehab using NPP.  Ed pt on stroke rehab and principles t/o session, unsure of what pt is retaining.  [employer] reps of walking no device and encouraged faster pace which pt showed much improvement.  Worked hard at  steps on foot clearance and wt shift.  Pt showing good neuro return so far from past few days.  Cont skilled PT toward LTGs   Problem List Decreased strength;Decreased range of motion;Impaired balance;Decreased endurance;Decreased mobility;Decreased coordination;Decreased safety awareness;Obesity   Barriers to Discharge Inaccessible home environment;Decreased caregiver support   Plan   Progress Progressing toward goals   PT Therapy Minutes   PT Time In 1430   PT Time Out 1530   PT Total Time (minutes) 60   PT Mode of treatment - Individual (minutes) 60   PT Mode of treatment - Concurrent (minutes) 0   PT Mode of  treatment - Group (minutes) 0   PT Mode of treatment - Co-treat (minutes) 0   PT Mode of Treatment - Total time(minutes) 60 minutes   PT Cumulative Minutes 295   Therapy Time missed   Time missed? No

## 2024-10-31 NOTE — ASSESSMENT & PLAN NOTE
Patient with fall to knees at shift change 10/31   Denied any knee pain  Get larger bariatric chair   Fall precautions.

## 2024-10-31 NOTE — ASSESSMENT & PLAN NOTE
Initially with hypertensive emergency  Currently on labetalol 300 mg p.o. twice daily, nifedipine 90 mg p.o. daily, hydralazine 25 mg p.o. every 8  Torsemide on hold since 10/23 secondary to concerns for overdiuresis  Hold off on torsemide for now as clinically appears euvolemic  Maintain MAP over 65  Clinically euvolemic hold off on diuretics for now,.  Still to be in diuretic phase of ATN  Blood pressure stable no medication changes

## 2024-10-31 NOTE — PROGRESS NOTES
Progress Note - Internal Medicine   Name: Tommie Taylor 59 y.o. male I MRN: [id number]  Unit/Bed#: -01 I Date of Admission: 10/26/2024   Date of Service: 10/31/2024 I Hospital Day: 5    Assessment & Plan  CVA (cerebral vascular accident) (HCC)  Initial ED presentation 10/14/2024 with RUE and RLE extremity weakness and R facial droop. CTH and CTA were initially negative for acute process, LKW was 1 hour prior to presentation.   , cardene drip initiated to stabilized BP, TNK administration after stabilization of HTN. Symptoms worsened s/p TNK but f/u CT again demonstrated no acute process.   24 hr post-TNK CT negative.   MRI completed, demonstrated indicated left corona radiata stroke.   DAPT initiated for 3 weeks with plan for transition to asa monotherapy.   Continue ASA 81mg and Plavix 75mg daily until 11/6 then transition to ASA 81mg daily monotherapy.  Cardiology recommends Zio patch after discharge.  Follow up with Neurovascular after discharge.     PT/OT/ST per primary team.   Bipolar I disorder, severe, current or most recent episode depressed, with psychotic features ([facility])  Currently on bupropion 100mg BID and olanzapine 10mg at HS with valbenazine tosylate 40mg for tardive dyskinesia per home regimen. Follow up outpatient with psychiatry.   GERD (gastroesophageal reflux disease)  EGD with GI 10/14, directed to continue with Protonix. Tums available PRN for dyspepsia.   Insomnia  Educate and encourage on sleep hygiene. Continue Trazodone at HS for sleep.   DAISY (obstructive sleep apnea)  Continue use of CPAP with home settings.  Does not use CPAP at home - currently broken.  Recommend overnight oximetry prior to discharge.   Anemia, unspecified  Hgb currently 9.4.  Hemoglobin 12.1 on admission.   Most recent iron panel on 10/15: Iron Sat 38%, TIBC 204, Iron 77, and Ferritin 111.  No need for iron supplementation at this time.  FOBT collected yesterday and negative  Continue to trend routine  CBC.  Hyperlipidemia  Most recent lipid panel 10/16/24: Cholesterol 154, triglycerides 120, HDL 40, LDL 90.  Continue Lipitor 40mg daily, increased from home 20mg daily.  Class 3 severe obesity due to excess calories with serious comorbidity and body mass index (BMI) of 40.0 to 44.9 in adult ([facility])  BMI 42.74 on admission. Encourage lifestyle modifications and provide support for nutritional teaching. Consult placed to nutrition services during acute course.   Tardive dyskinesia  Continue on home treatment Valbenazine Tosylate 40mg HS.   Hypertension  Presented with hypertensive emergency on initial hospitalization.   Concerns about medication non-compliance.  Home regimen: labetalol 100mg BID and amlodipine 10mg daily.  Target SBP 140s, currently on labetalol 300mg BID and Nifedipine 90mg daily with hydralazine 25mg Q8 hours.   Nephrology following.   DM2 (diabetes mellitus, type 2) ([facility])  Lab Results   Component Value Date    HGBA1C 8.6 (H) 10/16/2024       Recent Labs     10/30/24  1117 10/30/24  1631 10/30/24  2134 10/31/24  0613   POCGLU 123 131 113 154*       Blood Sugar Average: Last 72 hrs:  (P) [id number]    Continue insulin management with sliding scale.  Pt had been using ozempic for home glucose management but ran out and did not have any other glycemic control agents.     Acute kidney injury superimposed on stage 4 chronic kidney disease ([facility])  Lab Results   Component Value Date    EGFR 17 10/30/2024    EGFR 17 10/28/2024    EGFR 15 10/26/2024    CREATININE 3.63 (H) 10/30/2024    CREATININE 3.55 (H) 10/28/2024    CREATININE 4.04 (H) 10/26/2024     Creatinine currently 3.63.  Baseline creatinine 2.5-2.9.    Avoid nephrotoxins.  Home diuretics currently on hold.  Encourage hydration.  Renal ultrasound demonstrates no hydro but mild bladder wall thickening.   Nephrology following.  Labs MWF per Nephro.  Follows with Dr. No (Nephrology) as outpatient.  Encephalopathy  Lethargy and encephalopathic  symptoms noted during acute course.  B12 and head CT WNL. No subsequent episodes noted.   EEG on 10/22 consistent with moderate nonspecific cerebral dysfunction, no seizure activity.  Continue to monitor behavior and symptoms for signs of changes.   Most recent MoCA was  in 2024.  Per Psych - if continues to have delirium consider discontinuing Wellbutrin.  Concerns DAISY could also be contributing to encephalopathy per acute care.  Vitamin D deficiency  Vitamin D level <7 on 10/7.  Continue home vitamin D 50,000u weekly.  Continue to follow-up with Nephrology as outpatient.    VTE Pharmacologic Prophylaxis:   Pharmacologic: Heparin  Mechanical VTE Prophylaxis in Place: Yes - sequential compression devices.    Current Length of Stay: 5 day(s)    Current Patient Status: Inpatient Rehab     Discharge Plan: As per primary team.    Code Status: Level 1 - Full Code    Subjective:   Pt examined while pt sitting in recliner in pt room.  Fell out of his chair last night and landed on his knees while trying to reposition himself.  Denies hitting any other parts of his body.  Denies any pain.  Slept well last night.  Denies any headaches, lightheadedness, dizziness, SOB, palpitations, CP, or abdominal pain.  LBM was on 10/29.  Denies any pain to the RUE but does admit that the arm feels heavy and tight.  Currently has no other concerns or complaints at this time.    Objective:     Vitals:   Temp (24hrs), Av °F (36.1 °C), Min:96.1 °F (35.6 °C), Max:97.7 °F (36.5 °C)    Temp:  [96.1 °F (35.6 °C)-97.7 °F (36.5 °C)] 97.7 °F (36.5 °C)  HR:  [66-76] 74  Resp:  [18-20] 18  BP: (142-178)/() 151/74  SpO2:  [96 %-99 %] 96 %  Body mass index is 42.74 kg/m².     Review of Systems   Constitutional:  Negative for appetite change, chills, fatigue and fever.   HENT:  Negative for trouble swallowing.    Eyes:  Negative for visual disturbance.   Respiratory:  Negative for cough, shortness of breath, wheezing and stridor.     Cardiovascular:  Negative for chest pain, palpitations and leg swelling.   Gastrointestinal:  Negative for abdominal distention, abdominal pain, constipation, diarrhea, nausea and vomiting.        LBM 10/29   Genitourinary:  Negative for difficulty urinating.   Musculoskeletal:  Negative for arthralgias, back pain and gait problem.   Neurological:  Positive for weakness. Negative for dizziness, light-headedness, numbness and headaches.        RUE feels tight/heavy   Psychiatric/Behavioral:  Negative for dysphoric mood and sleep disturbance. The patient is not nervous/anxious.    All other systems reviewed and are negative.       Input and Output Summary (last 24 hours):       Intake/Output Summary (Last 24 hours) at 10/31/2024 0924  Last data filed at 10/31/2024 0627  Gross per 24 hour   Intake 960 ml   Output 1450 ml   Net -490 ml       Physical Exam:     Physical Exam  Vitals and nursing note reviewed.   Constitutional:       General: He is not in acute distress.     Appearance: Normal appearance. He is obese. He is not ill-appearing.   HENT:      Head: Normocephalic and atraumatic.   Cardiovascular:      Rate and Rhythm: Normal rate and regular rhythm.      Pulses: Normal pulses.      Heart sounds: Normal heart sounds. No murmur heard.     No friction rub.   Pulmonary:      Effort: Pulmonary effort is normal. No respiratory distress.      Breath sounds: Normal breath sounds. No wheezing or rhonchi.   Abdominal:      General: Abdomen is flat. Bowel sounds are normal. There is no distension.      Palpations: Abdomen is soft. There is no mass.      Tenderness: There is no abdominal tenderness. There is no guarding or rebound.      Hernia: No hernia is present.   Musculoskeletal:      Cervical back: Normal range of motion and neck supple. No tenderness.      Right lower leg: Edema (Moderate non-pitting edema) present.      Left lower leg: Edema (Moderate non-pitting edema) present.   Skin:     General: Skin is warm  and dry.   Neurological:      Mental Status: He is alert and oriented to person, place, and time.      Motor: Weakness (RUE strength 3/5, hand  absent) present.   Psychiatric:         Mood and Affect: Mood normal.         Behavior: Behavior normal.         Additional Data:     Labs:    Results from last 7 days   Lab Units 10/30/24  0601   WBC Thousand/uL 7.66   HEMOGLOBIN g/dL 9.4*   HEMATOCRIT % 27.5*   PLATELETS Thousands/uL 192   SEGS PCT % 53   LYMPHO PCT % 30   MONO PCT % 12   EOS PCT % 3     Results from last 7 days   Lab Units 10/30/24  0601   SODIUM mmol/L 138   POTASSIUM mmol/L 4.6   CHLORIDE mmol/L 109*   CO2 mmol/L 23   BUN mg/dL 32*   CREATININE mg/dL 3.63*   ANION GAP mmol/L 6   CALCIUM mg/dL 8.6   GLUCOSE RANDOM mg/dL 94         Results from last 7 days   Lab Units 10/31/24  0613 10/30/24  2134 10/30/24  1631 10/30/24  1117 10/30/24  0600 10/29/24  2053 10/29/24  1554 10/29/24  1118 10/29/24  0530 10/28/24  1949 10/28/24  1636 10/28/24  1616   POC GLUCOSE mg/dl 154* 113 131 123 96 124 144* 127 83 121 132 142*               Labs reviewed    Imaging:    Imaging reviewed    Recent Cultures (last 7 days):           Last 24 Hours Medication List:   Current Facility-Administered Medications   Medication Dose Route Frequency Provider Last Rate    acetaminophen  650 mg Oral Q6H PRN Alex S George, DO      aspirin  81 mg Oral Daily Alex S George, DO      atorvastatin  40 mg Oral QPM Alex S George, DO      buPROPion  100 mg Oral BID Alex S George, DO      calcium carbonate  1,000 mg Oral BID PRN Alex S George, DO      clopidogrel  75 mg Oral Daily Alex S George, DO      docusate sodium  100 mg Oral BID Cathy Miranda MD      fluticasone  1 spray Each Nare Daily SAMANTHA Kern      heparin (porcine)  7,500 Units Subcutaneous Q8H MIGEL Alex S George, DO      hydrALAZINE  25 mg Oral Q8H MIGEL Alex S George, DO      insulin lispro  1-5 Units Subcutaneous HS Alex S George, DO      insulin lispro  1-6 Units  Subcutaneous TID AC Alex George, DO      labetalol  300 mg Oral BID Alex George, DO      lactulose  20 g Oral TID PRN Cathy Miranda MD      loratadine  5 mg Oral Daily SAMANTHA Kern      NIFEdipine  90 mg Oral Daily Alexrobert George, DO      OLANZapine  10 mg Oral HS Alex George, DO      ondansetron  4 mg Oral Q6H PRN SAMANTHA Kern      pantoprazole  40 mg Oral Early Morning Alexrobert George, DO      polyethylene glycol  17 g Oral Daily Cathy Miranda MD      senna  2 tablet Oral HS Cathy Miranda MD      traZODone  50 mg Oral HS PRN Alex George, DO      Valbenazine Tosylate  40 mg Oral HS Alex George, DO          M*Modal software was used to dictate this note.  It may contain errors with dictating incorrect words or incorrect spelling. Please contact the provider directly with any questions.

## 2024-10-31 NOTE — ASSESSMENT & PLAN NOTE
Presented with hypertensive emergency on initial hospitalization.   Concerns about medication non-compliance.  Home regimen: labetalol 100mg BID and amlodipine 10mg daily.  Target SBP 140s, currently on labetalol 300mg BID and Nifedipine 90mg daily with hydralazine 25mg Q8 hours.   Nephrology following.

## 2024-10-31 NOTE — PROGRESS NOTES
Progress Note - PMR   Name: Tommie Taylor 59 y.o. male I MRN: [id number]  Unit/Bed#: [facility] (formerly [facility]) 267-01 I Date of Admission: 10/26/2024   Date of Service: 10/31/2024 I Hospital Day: 5     Assessment & Plan  CVA (cerebral vascular accident) (HCC)  Patient with uncontrolled hypertension and diabetes presents with right upper and right lower extremity weakness as well as facial droop  A CT of the head as well as a CTA of the head and neck were completed with negative for acute abnormalities for an acute process  TNK had been administered following the correction of his hypertension after being placed on a Cardene drip  After ministration of the TNK developed worsening dysarthria as well as headache and a repeat CT was still negative.  The 24 post TNK CT was also negative  Neurology followed the patient and MRI was completed and was significant for left corona radiata stroke  Placed on dual antiplatelet therapy for 3 weeks with plans for monotherapy with aspirin and statin indefinitely for secondary stroke prophylaxis  Recommendation for a Zio patch as an outpatient  Follow-up with neurovascular attending in 6 to 8 weeks  Physical, occupational and speech therapy while on the acute rehabilitation unit  Bipolar I disorder, severe, current or most recent episode depressed, with psychotic features (Formerly [facility])  History of chronic bipolar 1 disorder and follows with outpatient psychiatry and also was seen inpatient  Mood has been stable and is maintained on Zyprexa, bupropion and trazodone as well as Ingrezza for tardive dyskinesia  Follow-up with psychiatry as an outpatient and consider virtual consultation if indicated for any changes while on ARC  GERD (gastroesophageal reflux disease)  Continue Protonix as well as as needed Tums  Insomnia  Continue trazodone and consider sleep logs  DAISY (obstructive sleep apnea)  Continue CPAP with home settings  Ordered and compliant per records  Anemia, unspecified  Hemoglobin most recently of  10.4 which is up from 9.3 but has been hovering in the 10-11 range for most of admission  Continue to monitor with biweekly CBC or sooner if clinically indicated  Hyperlipidemia  Continue Lipitor 40 mg every evening  Class 3 severe obesity due to excess calories with serious comorbidity and body mass index (BMI) of 40.0 to 44.9 in adult (Formerly [facility])  Continue with exercise and promote lifestyle modification, weight loss counseling and management of medical conditions to optimize status  Tardive dyskinesia  Continue Ingrezza 40 mg at bedtime  Hypertension  Presented to the hospital significantly elevated blood pressure with systolic ranging from 197-231  Was placed on a Cardizem drip initially for hypertensive emergency  Was previously on Norvasc 10 mg daily as well as labetalol 100 mg twice daily  Neurology was consulted for BP management and Norvasc was discontinued and started nifedipine and increased as well as a increase to labetalol to 300 mg twice daily  Had been on Demadex 20 mg daily but was on hold due to the increasing creatinine  Goals for normotension  Mgmt per IM  Follow-up with nephrology as an outpatient  DM2 (diabetes mellitus, type 2) (Formerly [facility])  Lab Results   Component Value Date    HGBA1C 8.6 (H) 10/16/2024       Recent Labs     10/30/24  1631 10/30/24  2134 10/31/24  0613 10/31/24  1131   POCGLU 131 113 154* 107     Most recent hemoglobin A1c of 8.6 and technically uncontrolled although blood sugars in the hospital have been well-controlled  Currently on semaglutide as an outpatient but was on hold due to ROSINA in the hospital  Continue sliding scale and 4 times daily Accu-Cheks and as well as a diabetic diet    Acute kidney injury superimposed on stage 4 chronic kidney disease (Formerly [facility])  Lab Results   Component Value Date    EGFR 17 10/30/2024    EGFR 17 10/28/2024    EGFR 15 10/26/2024    CREATININE 3.63 (H) 10/30/2024    CREATININE 3.55 (H) 10/28/2024    CREATININE 4.04 (H) 10/26/2024     Recent creatinine of  3.63 10/30 Prior baseline around 2.9-3.0  Etiology felt to be related potentially to ATN in the setting of uncontrolled hypertension and complicated by contrast associated nephropathy  Nephrology was consulted and followed and a UA showed microhematuria and proteinuria.  An ultrasound of the kidney/bladder showed wall thickening but no hydronephrosis  Bladder scans negative for retention  Monitor BMP triweekly or sooner if clinically indicated  Demadex has been on hold and had periodically required IV fluids  Nephrology following  Follow with nephrology as an outpatient or sooner on the ARC if any acute changes  Encephalopathy  Had lethargy on exam back on 10/17 in acute care with improvements however more recently had issues with confusion and decreased consciousness in the mornings that improves throughout the day with unclear etiology  Prior history of cognitive impairments with last MoCA score of 18  CT of the head was stable, B12 level (446) wnl  Was evaluated by psychiatry with no changes in medications recommended  EEG showed no epileptiform activity just moderate nonspecific dysfunction  Will need to monitor especially with change in environment now on the ARC for any changes  Impaired mobility and activities of daily living  Patient was evaluated by the rehabilitation team MD and an appropriate candidate for acute inpatient rehabilitation program at this time.  The patient will tolerate 3 hours/day 5 to 7 days/week of intensive physical, occupational and speech therapy in order to obtain goals for community discharge  Due to the patient's functional Compared to their baseline level of function in addition to their ongoing medical needs, the patient would benefit from daily supervision from a rehabilitation physician as well as rehabilitation nursing to implement and adjust the medical as well as functional plan of care in order to meet the patient's goals.  DC: TBD reteam         At risk for alteration in  bowel function  Constipated no bm for several days; lactulose PRN x3 or until BM : had Bmx3 on 10/29  Start colase, senna daily and miralax prn  Titrate as needed   Overgrown toenails  Cs podiatry   Vitamin D deficiency  <7 10/7  Mgmt per IM/nephrology   Fall during current hospitalization  Patient with fall to knees at shift change 10/31   Denied any knee pain  Get larger bariatric chair   Fall precautions.     History of Present Illness   Tommie Taylor is a 59 y.o. male with history of bipolar disorder, CKD stage IV, type 2 diabetes, hypertension, tardive dyskinesia, sleep apnea, GERD who presented to the [facility] on 10/14/2024 for right-sided weakness and facial droop.  MRI revealed a left corona radiata infarct and neurology was consulted and was administered TNK.  He initially required a Cardene drip for his hypertensive emergency and was eventually placed on dual antiplatelet therapy with plan to continue aspirin and Plavix until that date and continue with aspirin as monotherapy as well as atorvastatin for secondary stroke prophylaxis.  He was recommended for Zio patch placement as an outpatient.  His hospital course was complicated by acute kidney injury on top of his CKD thought to be related to ATN in the setting of uncontrolled blood pressure as well as contrast associated nephropathy.  Nephrology did follow during his course and had some levels of improvement however has not returned back to his baseline level of creatinine.  Additionally there was some levels of encephalopathy which are stable at this time. The patient was evaluated by the Rehabilitation team and deemed an appropriate candidate for comprehensive inpatient rehabilitation and admitted to the [facility] [place] on 10/26/2024  5:13 PM     Rehab Diagnosis: Impairment of mobility, safety, Activities of Daily Living (ADLs), and cognitive/communication skills due to Stroke:  01.2  Right Body Involvement (Left Brain)  Etiologic  Dx: Left Corona Radiata Infarct  Date of Onset: 10/14/2024   Date of surgery: N/A  Chief Complaint: f/u stroke    Interval: Patient seen and examined in bed. Patient with a fall to his knees unwitnessed overnight.  Reports overall feeling well. Denied any knee pain. Last BM 10/29. Sleeping well at night. Denies any f/c/n/v, CP, SOB, abdominal pain, constipation, or diarrhea.       Objective   Functional Update:  Physical Therapy Occupational Therapy Speech Therapy   Weight Bearing Status: Full Weight Bearing  Transfers: Moderate Assistance  Bed Mobility: Moderate Assistance  Amulation Distance (ft): 10 feet  Ambulation: Moderate Assistance  Assistive Device for Ambulation: Alberto Walker  Wheelchair Mobility Distance: 150 ft  Wheelchair Mobility: Maximum Assistance  Assistive Device for Stairs: Lehft Hand Rail  Stair Assistance: Moderate Assistance  Discharge Recommendations:  (TBD pending support)   Eating: Supervision (KELLY, relying on compensatory tech)  Grooming: Supervision (KELLY, relying on compensatory tech)  Bathing: Moderate Assistance  Bathing: Moderate Assistance  Upper Body Dressing: Moderate Assistance  Lower Body Dressing: Moderate Assistance, Maximum Assistance  Toileting: Maximum Assistance  Toilet Transfer: Moderate Assistance  Cognition: Exceptions to WNL  Cognition: Decreased Memory, Decreased Executive Functions, Decreased Attention, Decreased Comprehension, Decreased Safety, Behavioral Considerations, Impulsive  Orientation: Person, Place   Mode of Communication: Verbal  Speech/Language: Dysarthia  Cognition: Exceptions to WNL  Cognition: Decreased Memory, Decreased Executive Functions, Decreased Attention, Decreased Comprehension  Orientation: Person, Time, Situation  Discharge Recommendations:  (pending pt progress)         Temp:  [96.1 °F (35.6 °C)-97.7 °F (36.5 °C)] 97.7 °F (36.5 °C)  HR:  [66-76] 73  Resp:  [18-20] 18  BP: (142-178)/() 151/76  SpO2:  [96 %-99 %] 96 %    Physical Exam    Gen:  No acute distress, obese   HEENT: Moist mucus membranes, Normocephalic/Atraumatic  Cardiovascular: Regular rate, rhythm,  Heme/Extr: bilateral LE edema   Pulmonary: Non-labored breathing.   : No amezquita  GI:  non-distended. BS+  MSK: ROM is WFL in all extremities.   Integumentary: Skin is warm, dry. .  Neuro:dysarthric, right sided facial droop, RUE weakness, RLE weakness   Psych: Normal mood and affect.       Scheduled Meds:  Current Facility-Administered Medications   Medication Dose Route Frequency Provider Last Rate    acetaminophen  650 mg Oral Q6H PRN Alex S George, DO      aspirin  81 mg Oral Daily Alex S George, DO      atorvastatin  40 mg Oral QPM Alex S George, DO      buPROPion  100 mg Oral BID Alex S George, DO      calcium carbonate  1,000 mg Oral BID PRN Alex S George, DO      clopidogrel  75 mg Oral Daily Alex S George, DO      docusate sodium  100 mg Oral BID Cathy Miranda MD      fluticasone  1 spray Each Nare Daily SAMANTHA Kern      heparin (porcine)  7,500 Units Subcutaneous Q8H MIGEL Alex S George, DO      hydrALAZINE  25 mg Oral Q8H MIGEL Alex S George, DO      insulin lispro  1-5 Units Subcutaneous HS Alex S George, DO      insulin lispro  1-6 Units Subcutaneous TID AC Alex S George, DO      labetalol  300 mg Oral BID Alex S George, DO      lactulose  20 g Oral TID PRN Cathy Miranda MD      loratadine  5 mg Oral Daily SAMANTHA Kern      NIFEdipine  90 mg Oral Daily Alex S George, DO      OLANZapine  10 mg Oral HS Alex S George, DO      ondansetron  4 mg Oral Q6H PRN SAMANTHA Kern      pantoprazole  40 mg Oral Early Morning Alex S George, DO      polyethylene glycol  17 g Oral Daily Cathy Miranda MD      senna  2 tablet Oral HS Cathy Miranda MD      traZODone  50 mg Oral HS PRN Alex S George, DO      Valbenazine Tosylate  40 mg Oral HS Alex S George, DO           Lab Results: I have reviewed the following results:  Results from last 7 days   Lab Units  10/30/24  0601 10/28/24  1022   HEMOGLOBIN g/dL 9.4* 9.8*   HEMATOCRIT % 27.5* 30.0*   WBC Thousand/uL 7.66 6.45   PLATELETS Thousands/uL 192 190     Results from last 7 days   Lab Units 10/30/24  0601 10/28/24  0501 10/26/24  0842   BUN mg/dL 32* 32* 36*   SODIUM mmol/L 138 139 140   POTASSIUM mmol/L 4.6 3.8 3.8   CHLORIDE mmol/L 109* 108 111*   CREATININE mg/dL 3.63* 3.55* 4.04*              Cathy Miranda MD   Physical Medicine and Rehabilitation   10/31/24    I have spent a total time of 38 minutes in caring for this patient on the day of the visit/encounter including Counseling / Coordination of care, Documenting in the medical record, and Communicating with other healthcare professionals .

## 2024-10-31 NOTE — NURSING NOTE
"Approximately 1940 PCA went to answer patients call bell, patient was found on his knees on the floor. PCA called out for assistance. Patient was on his knees bending over with his upper body resting on the bed. Patient stated he \"leaned too far over in the recliner and slipped out onto his knees\". Patient was assisted by staff into the wheelchair and placed into bed. Patient states no injuries sustained, denies head strike, denies pain in bilateral knees, no open areas to skin, vital signs WNL. Significant other, Marie notified. On call provider Notified, no new orders given. Will continue to monitor patient.   "

## 2024-10-31 NOTE — QUICK NOTE
PMR On Call    Contacted by nursing, patient was found on the floor on his knees - he had leaned too far forward in his chair and scooted on his knees to the bed, and was leaning over the bed. No head strike, no knee pain, no swelling. No other new complaints and vitals were stable. Patient was transferred into bed without incident and bed/chair alarms are in place. Plan is getting him a different chair/bariatric chair in the AM.     Continue current plan of care.    Ashley de Padua, MD  Physical Medicine and Rehabilitation

## 2024-10-31 NOTE — ASSESSMENT & PLAN NOTE
Lab Results   Component Value Date    HGBA1C 8.6 (H) 10/16/2024       Recent Labs     10/31/24  1131 10/31/24  1612 10/31/24  2116 11/01/24  0551   POCGLU 107 111 91 91       Blood Sugar Average: Last 72 hrs:  (P) 115  Continue on insulin  most recent A1c 8.6% as of October 2024  Advised of tight glycemic control

## 2024-10-31 NOTE — ASSESSMENT & PLAN NOTE
Hgb currently 9.4.  Hemoglobin 12.1 on admission.   Most recent iron panel on 10/15: Iron Sat 38%, TIBC 204, Iron 77, and Ferritin 111.  No need for iron supplementation at this time.  FOBT collected yesterday and negative  Continue to trend routine CBC.

## 2024-10-31 NOTE — ASSESSMENT & PLAN NOTE
Most recent lipid panel 10/16/24: Cholesterol 154, triglycerides 120, HDL 40, LDL 90.  Continue Lipitor 40mg daily, increased from home 20mg daily.

## 2024-10-31 NOTE — PROGRESS NOTES
Progress Note - Nephrology   Name: Tommie Taylor 59 y.o. male I MRN: [id number]  Unit/Bed#: [facility] 267-01 I Date of Admission: 10/26/2024   Date of Service: 11/1/2024 I Hospital Day: 6      59-year-old male with multiple comorbidities including CKD stage IIIb and hypertension presented initially to outside facility with right-sided weakness nephrology was managing for acute kidney injury subsequently transferred over to [facility] rehab.   Assessment & Plan  Acute kidney injury superimposed on stage 4 chronic kidney disease (HCC)  Lab Results   Component Value Date    EGFR 17 11/01/2024    EGFR 17 10/30/2024    EGFR 17 10/28/2024    CREATININE 3.53 (H) 11/01/2024    CREATININE 3.63 (H) 10/30/2024    CREATININE 3.55 (H) 10/28/2024   Review of record shows patient has a baseline creatinine around 2.5 to 2.9 mg/dL  Creatinine on admission at [facility] was at 3.24 mg/dL on 10/14/2024  Creatinine on admission at [facility] at 4.04 mg/dL on 10/26/2024  Peak creatinine was thought to be 4.57 mg/dL on 10/21/2024  ROSINA thought to be secondary to IAN 10/14/2024 + ischemic injury secondary to severe hemodynamic perturbations plus some component of prerenal azotemia with subsequent ATN.  Creatinine today is at 3.53 milligrams per deciliter appears to be plateauing at this time  We will need to see where his new baseline creatinine will be  Continue to hold diuretics for now as appears euvolemic  Discussed with patient would like to wait a period of 3 months to see where new baseline creatinine will be  Workup revealed urine protein creatinine ratio 8.4 g prior to admission  Renal ultrasound no hydronephrosis  As an outpatient follows up with Dr. No for nephrology  Likely his underlying CKD secondary to age-related nephron loss plus hypertensive nephrosclerosis plus prior episode of acute kidney injury nondialysis requiring.  Stable for discharge from renal stable medically cleared will need outpatient follow-up upon  discharge  While in rehab recommend labs Monday Wednesday Friday  Check BMP, phosphorus on Monday  Nephrology will see back on Monday please call with any questions or concerns in the interim  CVA (cerebral vascular accident) ([facility])  Follow-up with neurology  Status post TNK administration  Bipolar I disorder, severe, current or most recent episode depressed, with psychotic features ([facility])  Follow-up with psych  Currently on Zyprexa and Wellbutrin  Anemia, unspecified  Most recent hemoglobin stable at 9.4 g/dL  Maintain hemoglobin greater than 8 g/dL  Continue to monitor hemoglobin for now  Check CBC on Monday  Hyperlipidemia  Continue on atorvastatin  Goal LDL less than 70  Hypertension  Initially with hypertensive emergency  Currently on labetalol 300 mg p.o. twice daily, nifedipine 90 mg p.o. daily, hydralazine 25 mg p.o. every 8  Torsemide on hold since 10/23 secondary to concerns for overdiuresis  Hold off on torsemide for now as clinically appears euvolemic  Maintain MAP over 65  Clinically euvolemic hold off on diuretics for now,.  Still to be in diuretic phase of ATN  Blood pressure stable no medication changes  DM2 (diabetes mellitus, type 2) ([facility])  Lab Results   Component Value Date    HGBA1C 8.6 (H) 10/16/2024       Recent Labs     10/31/24  1131 10/31/24  1612 10/31/24  2116 11/01/24  0551   POCGLU 107 111 91 91       Blood Sugar Average: Last 72 hrs:  (P) 115  Continue on insulin  most recent A1c 8.6% as of October 2024  Advised of tight glycemic control  Impaired mobility and activities of daily living  Currently with wheelchair  Follow-up with [facility] rehab  Fall during current hospitalization      I have reviewed the nephrology recommendations including no medication changes avoid diuretics for now recheck blood work on Monday nephrology will see back on Monday, with medicine team, and we are in agreement with renal plan including the information outlined above.     Subjective   Brief History of Admission  -   Patient seen and examined in his room hemodynamically stable remains afebrile urine output close to 2 L / 24 hours afebrile happy renal parameters at least stable for now.    Objective :  Temp:  [95.8 °F (35.4 °C)-98.4 °F (36.9 °C)] 98.4 °F (36.9 °C)  HR:  [66-75] 74  BP: (130-151)/(63-77) 130/63  Resp:  [20] 20  SpO2:  [96 %-97 %] 97 %  O2 Device: None (Room air)    Current Weight: Weight - Scale: 120 kg (264 lb 12.8 oz)  First Weight: Weight - Scale: 120 kg (264 lb 12.8 oz)  I/O         10/29 0701  10/30 0700 10/30 0701  10/31 0700 10/31 0701 11/01 0700    P.O. 960 1380     Total Intake(mL/kg) 960 (8) 1380 (11.5)     Urine (mL/kg/hr) 1350 (0.5) 1875 (0.7) 500 (0.9)    Total Output 1350 1875 500    Net -390 -495 -500           Unmeasured Urine Occurrence 1 x 2 x           Physical Exam  Vitals and nursing note reviewed.   Constitutional:       General: He is not in acute distress.     Appearance: Normal appearance. He is obese. He is not ill-appearing, toxic-appearing or diaphoretic.   HENT:      Head: Normocephalic and atraumatic.      Mouth/Throat:      Mouth: Mucous membranes are moist.      Pharynx: No oropharyngeal exudate.   Eyes:      General: No scleral icterus.  Cardiovascular:      Rate and Rhythm: Normal rate.   Pulmonary:      Effort: No respiratory distress.      Breath sounds: No stridor. No wheezing.   Abdominal:      General: There is no distension.      Palpations: Abdomen is soft.      Tenderness: There is no abdominal tenderness.   Musculoskeletal:         General: No swelling.      Cervical back: Normal range of motion. No rigidity.   Skin:     Coloration: Skin is not jaundiced.   Neurological:      General: No focal deficit present.      Mental Status: He is alert and oriented to person, place, and time.   Psychiatric:         Mood and Affect: Mood normal.         Behavior: Behavior normal.       ROS:  Constitutional:  No chills, no fever.   HENT:  No sore throat  Respiratory:  No cough,  no hemoptysis, no wheezing.    Cardiovascular:  no leg swelling.   Gastrointestinal:  No constipation, no diarrhea.   Genitourinary:  No dysuria and hematuria. No decreased urine output.  Musculoskeletal:  No back pain.   Neurological:  no dizziness, No headaches.   Psychiatric/Behavioral:  No agitation, no confusion.    All other systems reviewed and are negative.        Medications:    Current Facility-Administered Medications:     acetaminophen (TYLENOL) tablet 650 mg, 650 mg, Oral, Q6H PRN, Alex George DO, 650 mg at 10/26/24 1842    aspirin (ECOTRIN LOW STRENGTH) EC tablet 81 mg, 81 mg, Oral, Daily, Alex George, DO, 81 mg at 10/31/24 0940    atorvastatin (LIPITOR) tablet 40 mg, 40 mg, Oral, QPM, Alex George DO, 40 mg at 10/31/24 1807    buPROPion (WELLBUTRIN) tablet 100 mg, 100 mg, Oral, BID, Alex George, , 100 mg at 10/31/24 1807    calcium carbonate (TUMS) chewable tablet 1,000 mg, 1,000 mg, Oral, BID PRN, Alex George DO    clopidogrel (PLAVIX) tablet 75 mg, 75 mg, Oral, Daily, Alex George DO, 75 mg at 10/31/24 0940    docusate sodium (COLACE) capsule 100 mg, 100 mg, Oral, BID, Cathy Miranda MD, 100 mg at 10/31/24 1807    fluticasone (FLONASE) 50 mcg/act nasal spray 1 spray, 1 spray, Each Nare, Daily, SAMANTHA Kern, 1 spray at 10/31/24 0942    heparin (porcine) subcutaneous injection 7,500 Units, 7,500 Units, Subcutaneous, Q8H MIGEL, Alex George DO, 7,500 Units at 11/01/24 0544    hydrALAZINE (APRESOLINE) tablet 25 mg, 25 mg, Oral, Q8H MIGEL, Alex George DO, 25 mg at 11/01/24 0544    insulin lispro (HumALOG/ADMELOG) 100 units/mL subcutaneous injection 1-5 Units, 1-5 Units, Subcutaneous, HS, Alex George DO    insulin lispro (HumALOG/ADMELOG) 100 units/mL subcutaneous injection 1-6 Units, 1-6 Units, Subcutaneous, TID AC, 1 Units at 10/31/24 0942 **AND** Fingerstick Glucose (POCT), , , 4x Daily AC and at bedtime, Alex George DO    labetalol (NORMODYNE) tablet 300 mg, 300  "mg, Oral, BID, Alex George DO, 300 mg at 10/31/24 2150    lactulose (CHRONULAC) oral solution 20 g, 20 g, Oral, TID PRN, Cathy Miranda MD, 20 g at 10/29/24 1016    loratadine (CLARITIN) tablet 5 mg, 5 mg, Oral, Daily, SAMANTHA Kern, 5 mg at 10/31/24 0940    NIFEdipine (PROCARDIA XL) 24 hr tablet 90 mg, 90 mg, Oral, Daily, Alex George DO, 90 mg at 10/31/24 0940    OLANZapine (ZyPREXA) tablet 10 mg, 10 mg, Oral, HS, Alex George DO, 10 mg at 10/31/24 2150    ondansetron (ZOFRAN-ODT) dispersible tablet 4 mg, 4 mg, Oral, Q6H PRN, SAMANTHA Kern    pantoprazole (PROTONIX) EC tablet 40 mg, 40 mg, Oral, Early Morning, Alex George DO, 40 mg at 11/01/24 0544    polyethylene glycol (MIRALAX) packet 17 g, 17 g, Oral, Daily, Cathy Miranda MD, 17 g at 10/31/24 0940    senna (SENOKOT) tablet 17.2 mg, 2 tablet, Oral, HS, Cathy Miranda MD, 17.2 mg at 10/31/24 2150    traZODone (DESYREL) tablet 50 mg, 50 mg, Oral, HS PRN, Alex George DO    Valbenazine Tosylate CAPS 40 mg, 40 mg, Oral, HS, Alex George DO, 40 mg at 10/31/24 2156      Lab Results: I have reviewed the following results:  Results from last 7 days   Lab Units 11/01/24  0546 10/30/24  0601 10/28/24  1022 10/28/24  0501 10/26/24  0842   WBC Thousand/uL  --  7.66 6.45  --   --    HEMOGLOBIN g/dL  --  9.4* 9.8*  --   --    HEMATOCRIT %  --  27.5* 30.0*  --   --    PLATELETS Thousands/uL  --  192 190  --   --    POTASSIUM mmol/L 4.7 4.6  --  3.8 3.8   CHLORIDE mmol/L 109* 109*  --  108 111*   CO2 mmol/L 24 23  --  24 24   BUN mg/dL 27* 32*  --  32* 36*   CREATININE mg/dL 3.53* 3.63*  --  3.55* 4.04*   CALCIUM mg/dL 8.6 8.6  --  8.4 8.5   PHOSPHORUS mg/dL 4.3 3.8  --  3.6  --        Administrative Statements     Portions of the record may have been created with voice recognition software. Occasional wrong word or \"sound a like\" substitutions may have occurred due to the inherent limitations of voice recognition software. Read the " chart carefully and recognize, using context, where substitutions have occurred.If you have any questions, please contact the dictating provider.

## 2024-10-31 NOTE — ASSESSMENT & PLAN NOTE
Lab Results   Component Value Date    HGBA1C 8.6 (H) 10/16/2024       Recent Labs     10/30/24  1631 10/30/24  2134 10/31/24  0613 10/31/24  1131   POCGLU 131 113 154* 107     Most recent hemoglobin A1c of 8.6 and technically uncontrolled although blood sugars in the hospital have been well-controlled  Currently on semaglutide as an outpatient but was on hold due to ROSINA in the hospital  Continue sliding scale and 4 times daily Accu-Cheks and as well as a diabetic diet

## 2024-10-31 NOTE — ASSESSMENT & PLAN NOTE
Lab Results   Component Value Date    HGBA1C 8.6 (H) 10/16/2024       Recent Labs     10/30/24  1117 10/30/24  1631 10/30/24  2134 10/31/24  0613   POCGLU 123 131 113 154*       Blood Sugar Average: Last 72 hrs:  (P) [id number]    Continue insulin management with sliding scale.  Pt had been using ozempic for home glucose management but ran out and did not have any other glycemic control agents.

## 2024-10-31 NOTE — ASSESSMENT & PLAN NOTE
Lab Results   Component Value Date    EGFR 17 11/01/2024    EGFR 17 10/30/2024    EGFR 17 10/28/2024    CREATININE 3.53 (H) 11/01/2024    CREATININE 3.63 (H) 10/30/2024    CREATININE 3.55 (H) 10/28/2024   Review of record shows patient has a baseline creatinine around 2.5 to 2.9 mg/dL  Creatinine on admission at [facility] was at 3.24 mg/dL on 10/14/2024  Creatinine on admission at [facility] at 4.04 mg/dL on 10/26/2024  Peak creatinine was thought to be 4.57 mg/dL on 10/21/2024  ROSINA thought to be secondary to IAN 10/14/2024 + ischemic injury secondary to severe hemodynamic perturbations plus some component of prerenal azotemia with subsequent ATN.  Creatinine today is at 3.53 milligrams per deciliter appears to be plateauing at this time  We will need to see where his new baseline creatinine will be  Continue to hold diuretics for now as appears euvolemic  Discussed with patient would like to wait a period of 3 months to see where new baseline creatinine will be  Workup revealed urine protein creatinine ratio 8.4 g prior to admission  Renal ultrasound no hydronephrosis  As an outpatient follows up with Dr. No for nephrology  Likely his underlying CKD secondary to age-related nephron loss plus hypertensive nephrosclerosis plus prior episode of acute kidney injury nondialysis requiring.  Stable for discharge from renal stable medically cleared will need outpatient follow-up upon discharge  While in rehab recommend labs Monday Wednesday Friday  Check BMP, phosphorus on Monday  Nephrology will see back on Monday please call with any questions or concerns in the interim

## 2024-11-01 LAB
ANION GAP SERPL CALCULATED.3IONS-SCNC: 5 MMOL/L (ref 4–13)
BUN SERPL-MCNC: 27 MG/DL (ref 5–25)
CALCIUM SERPL-MCNC: 8.6 MG/DL (ref 8.4–10.2)
CHLORIDE SERPL-SCNC: 109 MMOL/L (ref 96–108)
CO2 SERPL-SCNC: 24 MMOL/L (ref 21–32)
CREAT SERPL-MCNC: 3.53 MG/DL (ref 0.6–1.3)
GFR SERPL CREATININE-BSD FRML MDRD: 17 ML/MIN/1.73SQ M
GLUCOSE SERPL-MCNC: 112 MG/DL (ref 65–140)
GLUCOSE SERPL-MCNC: 127 MG/DL (ref 65–140)
GLUCOSE SERPL-MCNC: 150 MG/DL (ref 65–140)
GLUCOSE SERPL-MCNC: 86 MG/DL (ref 65–140)
GLUCOSE SERPL-MCNC: 91 MG/DL (ref 65–140)
HEMOCCULT STL QL: NEGATIVE
HEMOCCULT STL QL: NORMAL
HEMOCCULT STL QL: NORMAL
PHOSPHATE SERPL-MCNC: 4.3 MG/DL (ref 2.7–4.5)
POTASSIUM SERPL-SCNC: 4.7 MMOL/L (ref 3.5–5.3)
SODIUM SERPL-SCNC: 138 MMOL/L (ref 135–147)

## 2024-11-01 PROCEDURE — 97112 NEUROMUSCULAR REEDUCATION: CPT

## 2024-11-01 PROCEDURE — 97530 THERAPEUTIC ACTIVITIES: CPT

## 2024-11-01 PROCEDURE — 94660 CPAP INITIATION&MGMT: CPT

## 2024-11-01 PROCEDURE — 97110 THERAPEUTIC EXERCISES: CPT

## 2024-11-01 PROCEDURE — 82948 REAGENT STRIP/BLOOD GLUCOSE: CPT

## 2024-11-01 PROCEDURE — 99232 SBSQ HOSP IP/OBS MODERATE 35: CPT | Performed by: INTERNAL MEDICINE

## 2024-11-01 PROCEDURE — 80048 BASIC METABOLIC PNL TOTAL CA: CPT | Performed by: NURSE PRACTITIONER

## 2024-11-01 PROCEDURE — 82272 OCCULT BLD FECES 1-3 TESTS: CPT | Performed by: NURSE PRACTITIONER

## 2024-11-01 PROCEDURE — 97116 GAIT TRAINING THERAPY: CPT

## 2024-11-01 PROCEDURE — 84100 ASSAY OF PHOSPHORUS: CPT | Performed by: NURSE PRACTITIONER

## 2024-11-01 RX ADMIN — INSULIN LISPRO 1 UNITS: 100 INJECTION, SOLUTION INTRAVENOUS; SUBCUTANEOUS at 16:55

## 2024-11-01 RX ADMIN — HYDRALAZINE HYDROCHLORIDE 25 MG: 25 TABLET ORAL at 14:00

## 2024-11-01 RX ADMIN — LABETALOL HYDROCHLORIDE 300 MG: 100 TABLET, FILM COATED ORAL at 09:51

## 2024-11-01 RX ADMIN — HEPARIN SODIUM 7500 UNITS: 5000 INJECTION INTRAVENOUS; SUBCUTANEOUS at 13:57

## 2024-11-01 RX ADMIN — CLOPIDOGREL BISULFATE 75 MG: 75 TABLET ORAL at 09:51

## 2024-11-01 RX ADMIN — SENNOSIDES 17.2 MG: 8.6 TABLET, FILM COATED ORAL at 21:48

## 2024-11-01 RX ADMIN — OLANZAPINE 10 MG: 2.5 TABLET, FILM COATED ORAL at 21:47

## 2024-11-01 RX ADMIN — HYDRALAZINE HYDROCHLORIDE 25 MG: 25 TABLET ORAL at 21:47

## 2024-11-01 RX ADMIN — POLYETHYLENE GLYCOL 3350 17 G: 17 POWDER, FOR SOLUTION ORAL at 09:51

## 2024-11-01 RX ADMIN — HYDRALAZINE HYDROCHLORIDE 25 MG: 25 TABLET ORAL at 05:44

## 2024-11-01 RX ADMIN — ATORVASTATIN CALCIUM 40 MG: 40 TABLET, FILM COATED ORAL at 17:00

## 2024-11-01 RX ADMIN — BUPROPION HYDROCHLORIDE 100 MG: 100 TABLET, FILM COATED ORAL at 17:01

## 2024-11-01 RX ADMIN — PANTOPRAZOLE SODIUM 40 MG: 40 TABLET, DELAYED RELEASE ORAL at 05:44

## 2024-11-01 RX ADMIN — DOCUSATE SODIUM 100 MG: 100 CAPSULE, LIQUID FILLED ORAL at 17:00

## 2024-11-01 RX ADMIN — NIFEDIPINE 90 MG: 30 TABLET, FILM COATED, EXTENDED RELEASE ORAL at 09:50

## 2024-11-01 RX ADMIN — HEPARIN SODIUM 7500 UNITS: 5000 INJECTION INTRAVENOUS; SUBCUTANEOUS at 05:44

## 2024-11-01 RX ADMIN — TRAZODONE HYDROCHLORIDE 50 MG: 50 TABLET ORAL at 21:47

## 2024-11-01 RX ADMIN — FLUTICASONE PROPIONATE 1 SPRAY: 50 SPRAY, METERED NASAL at 09:51

## 2024-11-01 RX ADMIN — BUPROPION HYDROCHLORIDE 100 MG: 100 TABLET, FILM COATED ORAL at 09:50

## 2024-11-01 RX ADMIN — LABETALOL HYDROCHLORIDE 300 MG: 100 TABLET, FILM COATED ORAL at 21:48

## 2024-11-01 RX ADMIN — VALBENAZINE 40 MG: 40 CAPSULE ORAL at 22:08

## 2024-11-01 RX ADMIN — DOCUSATE SODIUM 100 MG: 100 CAPSULE, LIQUID FILLED ORAL at 09:51

## 2024-11-01 RX ADMIN — ASPIRIN 81 MG: 81 TABLET, COATED ORAL at 09:51

## 2024-11-01 RX ADMIN — LORATADINE 5 MG: 10 TABLET ORAL at 09:50

## 2024-11-01 RX ADMIN — HEPARIN SODIUM 7500 UNITS: 5000 INJECTION INTRAVENOUS; SUBCUTANEOUS at 21:46

## 2024-11-01 NOTE — PROGRESS NOTES
11/01/24 0930   Pain Assessment   Pain Assessment Tool 0-10   Pain Score No Pain   Restrictions/Precautions   Precautions Bed/chair alarms;Fall Risk;Supervision on toilet/commode;Cognitive   Bed-Chair Transfer   Type of Assistance Needed Physical assistance   Physical Assistance Level 25% or less   Comment Min A no deivce   Chair/Bed-to-Chair Transfer CARE Score 3   Therapeutic Interventions   Neuromuscular Re-Education in hallway performed reciprocal stepping over canes / quad canes and on foam pad used L HR and progressed to unsupported but needed Min/ Mod A for balance,  Performed multiple reps,  Std with HR soccer ball kick to challenge wt shift and RLE reaction,   Retro walking (needed Mod A at 1 point)   Assessment   Treatment Assessment 30 min skilled PT session focused on step overs / uneven terrain on foam mat/ retro walking/ and ball kicking.  Pt showing improvement with R stance  and wt shift but delayed reaction to LOB.  Pt needed light touch on Left but was able to perform some of these tasks unsupported ..   Barriers to Discharge Inaccessible home environment;Decreased caregiver support   PT Barriers   Physical Impairment Decreased strength;Decreased endurance;Impaired balance;Decreased mobility;Decreased coordination;Decreased cognition;Obesity   Plan   Progress Progressing toward goals   PT Therapy Minutes   PT Time In 0930   PT Time Out 1000   PT Total Time (minutes) 30   PT Mode of treatment - Individual (minutes) 30   PT Mode of treatment - Concurrent (minutes) 0   PT Mode of treatment - Group (minutes) 0   PT Mode of treatment - Co-treat (minutes) 0   PT Mode of Treatment - Total time(minutes) 30 minutes   PT Cumulative Minutes 325   Therapy Time missed   Time missed? No

## 2024-11-01 NOTE — ASSESSMENT & PLAN NOTE
Lab Results   Component Value Date    EGFR 17 11/01/2024    EGFR 17 10/30/2024    EGFR 17 10/28/2024    CREATININE 3.53 (H) 11/01/2024    CREATININE 3.63 (H) 10/30/2024    CREATININE 3.55 (H) 10/28/2024     Creatinine currently 3.53.  Baseline creatinine 2.5-2.9.    Avoid nephrotoxins.  Home diuretics currently on hold.  Encourage hydration.  Renal ultrasound demonstrates no hydro but mild bladder wall thickening.   Nephrology following.  Labs MWF per Nephro.  Follows with Dr. No (Nephrology) as outpatient.

## 2024-11-01 NOTE — ASSESSMENT & PLAN NOTE
Presented to the hospital significantly elevated blood pressure with systolic ranging from 197-231  Was placed on a Cardizem drip initially for hypertensive emergency  Had been on Demadex 20 mg daily but was on hold due to the increasing creatinine  Cw labetalol 300 mg BID , nifedipine 90 mg daily and hydralazine 25mg Q8h  Goals for normotension  Mgmt per IM  Follow-up with nephrology as an outpatient

## 2024-11-01 NOTE — ASSESSMENT & PLAN NOTE
Hgb currently 9.4.  Hemoglobin 12.1 on admission.   Most recent iron panel on 10/15: Iron Sat 38%, TIBC 204, Iron 77, and Ferritin 111.  No need for iron supplementation at this time.  FOBT negative on 10/29.  Continue to trend routine CBC.

## 2024-11-01 NOTE — ASSESSMENT & PLAN NOTE
Lab Results   Component Value Date    EGFR 17 11/01/2024    EGFR 17 10/30/2024    EGFR 17 10/28/2024    CREATININE 3.53 (H) 11/01/2024    CREATININE 3.63 (H) 10/30/2024    CREATININE 3.55 (H) 10/28/2024     Recent creatinine of 3.63 10/30 Prior baseline around 2.9-3.0  Etiology felt to be related potentially to ATN in the setting of uncontrolled hypertension and complicated by contrast associated nephropathy  Nephrology was consulted and followed and a UA showed microhematuria and proteinuria.  An ultrasound of the kidney/bladder showed wall thickening but no hydronephrosis  Bladder scans negative for retention  Monitor BMP triweekly or sooner if clinically indicated  Demadex has been on hold and had periodically required IV fluids  Nephrology following  Follow with nephrology as an outpatient or sooner on the [facility] if any acute changes

## 2024-11-01 NOTE — ASSESSMENT & PLAN NOTE
<7 10/7  Mgmt per IM/nephrology    Pt called stating they are delivering his motorized will chair today     Pt stated he will need a order for [employer] to come out and do PT

## 2024-11-01 NOTE — PROGRESS NOTES
Progress Note - PMR   Name: Tommie Taylor 59 y.o. male I MRN: [id number]  Unit/Bed#: [facility] 267-01 I Date of Admission: 10/26/2024   Date of Service: 11/1/2024 I Hospital Day: 6     Assessment & Plan  CVA (cerebral vascular accident) (HCC)  Patient with uncontrolled hypertension and diabetes presents with right upper and right lower extremity weakness as well as facial droop  A CT of the head as well as a CTA of the head and neck were completed with negative for acute abnormalities for an acute process  TNK had been administered following the correction of his hypertension after being placed on a Cardene drip  After ministration of the TNK developed worsening dysarthria as well as headache and a repeat CT was still negative.  The 24 post TNK CT was also negative  Neurology followed the patient and MRI was completed and was significant for left corona radiata stroke  Placed on dual antiplatelet therapy for 3 weeks with plans for monotherapy with aspirin and statin indefinitely for secondary stroke prophylaxis  Recommendation for a Zio patch as an outpatient  Follow-up with neurovascular attending in 6 to 8 weeks  Physical, occupational and speech therapy while on the acute rehabilitation unit  Bipolar I disorder, severe, current or most recent episode depressed, with psychotic features ([facility])  History of chronic bipolar 1 disorder and follows with outpatient psychiatry and also was seen inpatient  Mood has been stable and is maintained on Zyprexa, bupropion and trazodone as well as Ingrezza for tardive dyskinesia  Follow-up with psychiatry as an outpatient and consider virtual consultation if indicated for any changes while on ARC  GERD (gastroesophageal reflux disease)  Continue Protonix as well as as needed Tums  Insomnia  Continue trazodone and consider sleep logs  DAISY (obstructive sleep apnea)  Continue CPAP with home settings  Ordered and compliant per records  Anemia, unspecified  Hemoglobin most recently of  10.4 which is up from 9.3 but has been hovering in the 10-11 range for most of admission  Continue to monitor with biweekly CBC or sooner if clinically indicated  Hyperlipidemia  Continue Lipitor 40 mg every evening  Class 3 severe obesity due to excess calories with serious comorbidity and body mass index (BMI) of 40.0 to 44.9 in adult ([facility])  Continue with exercise and promote lifestyle modification, weight loss counseling and management of medical conditions to optimize status  Tardive dyskinesia  Continue Ingrezza 40 mg at bedtime  Hypertension  Presented to the hospital significantly elevated blood pressure with systolic ranging from 197-231  Was placed on a Cardizem drip initially for hypertensive emergency  Had been on Demadex 20 mg daily but was on hold due to the increasing creatinine  Cw labetalol 300 mg BID , nifedipine 90 mg daily and hydralazine 25mg Q8h  Goals for normotension  Mgmt per IM  Follow-up with nephrology as an outpatient  DM2 (diabetes mellitus, type 2) ([facility])  Lab Results   Component Value Date    HGBA1C 8.6 (H) 10/16/2024       Recent Labs     10/31/24  1612 10/31/24  2116 11/01/24  0551 11/01/24  1117   POCGLU 111 91 91 112     Most recent hemoglobin A1c of 8.6 and technically uncontrolled although blood sugars in the hospital have been well-controlled  Currently on semaglutide as an outpatient but was on hold due to ROSINA in the hospital  Continue sliding scale and 4 times daily Accu-Cheks and as well as a diabetic diet    Acute kidney injury superimposed on stage 4 chronic kidney disease ([facility])  Lab Results   Component Value Date    EGFR 17 11/01/2024    EGFR 17 10/30/2024    EGFR 17 10/28/2024    CREATININE 3.53 (H) 11/01/2024    CREATININE 3.63 (H) 10/30/2024    CREATININE 3.55 (H) 10/28/2024     Recent creatinine of 3.63 10/30 Prior baseline around 2.9-3.0  Etiology felt to be related potentially to ATN in the setting of uncontrolled hypertension and complicated by contrast associated  nephropathy  Nephrology was consulted and followed and a UA showed microhematuria and proteinuria.  An ultrasound of the kidney/bladder showed wall thickening but no hydronephrosis  Bladder scans negative for retention  Monitor BMP triweekly or sooner if clinically indicated  Demadex has been on hold and had periodically required IV fluids  Nephrology following  Follow with nephrology as an outpatient or sooner on the ARC if any acute changes  Encephalopathy  Had lethargy on exam back on 10/17 in acute care with improvements however more recently had issues with confusion and decreased consciousness in the mornings that improves throughout the day with unclear etiology  Prior history of cognitive impairments with last MoCA score of 18  CT of the head was stable, B12 level (446) wnl  Was evaluated by psychiatry with no changes in medications recommended  EEG showed no epileptiform activity just moderate nonspecific dysfunction  Will need to monitor especially with change in environment now on the ARC for any changes  Impaired mobility and activities of daily living  Patient was evaluated by the rehabilitation team MD and an appropriate candidate for acute inpatient rehabilitation program at this time.  The patient will tolerate 3 hours/day 5 to 7 days/week of intensive physical, occupational and speech therapy in order to obtain goals for community discharge  Due to the patient's functional Compared to their baseline level of function in addition to their ongoing medical needs, the patient would benefit from daily supervision from a rehabilitation physician as well as rehabilitation nursing to implement and adjust the medical as well as functional plan of care in order to meet the patient's goals.  DC: TBD reteam   At risk for alteration in bowel function  Constipated no bm for several days; lactulose PRN x3 or until BM : had Bmx3 on 10/29  Start colase, senna daily and miralax prn  Titrate as needed   Overgrown  toenails  Cs podiatry   Vitamin D deficiency  <7 10/7  Mgmt per IM/nephrology   Fall during current hospitalization  Patient with fall to knees at shift change 10/31   Denied any knee pain  Get larger bariatric chair   Fall precautions.     History of Present Illness   Tommie Taylor is a 59 y.o. male with history of bipolar disorder, CKD stage IV, type 2 diabetes, hypertension, tardive dyskinesia, sleep apnea, GERD who presented to the [facility] on 10/14/2024 for right-sided weakness and facial droop.  MRI revealed a left corona radiata infarct and neurology was consulted and was administered TNK.  He initially required a Cardene drip for his hypertensive emergency and was eventually placed on dual antiplatelet therapy with plan to continue aspirin and Plavix until that date and continue with aspirin as monotherapy as well as atorvastatin for secondary stroke prophylaxis.  He was recommended for Zio patch placement as an outpatient.  His hospital course was complicated by acute kidney injury on top of his CKD thought to be related to ATN in the setting of uncontrolled blood pressure as well as contrast associated nephropathy.  Nephrology did follow during his course and had some levels of improvement however has not returned back to his baseline level of creatinine.  Additionally there was some levels of encephalopathy which are stable at this time. The patient was evaluated by the Rehabilitation team and deemed an appropriate candidate for comprehensive inpatient rehabilitation and admitted to the [facility] on 10/26/2024  5:13 PM     Rehab Diagnosis: Impairment of mobility, safety, Activities of Daily Living (ADLs), and cognitive/communication skills due to Stroke:  01.2  Right Body Involvement (Left Brain)  Etiologic Dx: Left Corona Radiata Infarct  Date of Onset: 10/14/2024   Date of surgery: N/A  Chief Complaint: f/u stroke    Interval: Patient seen and examined laying in recliner. No  events overnight.  Reports overall feeling well. Last BM 10/29. Sleeping well at night. Denies any f/c/n/v, CP, SOB, abdominal pain, constipation, or diarrhea.       Objective   Functional Update:  Physical Therapy Occupational Therapy Speech Therapy   Weight Bearing Status: Full Weight Bearing  Transfers: Moderate Assistance  Bed Mobility: Moderate Assistance  Amulation Distance (ft): 10 feet  Ambulation: Moderate Assistance  Assistive Device for Ambulation: Alberto Walker  Wheelchair Mobility Distance: 150 ft  Wheelchair Mobility: Maximum Assistance  Assistive Device for Stairs: Lehft Hand Rail  Stair Assistance: Moderate Assistance  Discharge Recommendations:  (TBD pending support)   Eating: Supervision (KELLY, relying on compensatory tech)  Grooming: Supervision (KELLY, relying on compensatory tech)  Bathing: Moderate Assistance  Bathing: Moderate Assistance  Upper Body Dressing: Moderate Assistance  Lower Body Dressing: Moderate Assistance, Maximum Assistance  Toileting: Maximum Assistance  Toilet Transfer: Moderate Assistance  Cognition: Exceptions to WNL  Cognition: Decreased Memory, Decreased Executive Functions, Decreased Attention, Decreased Comprehension, Decreased Safety, Behavioral Considerations, Impulsive  Orientation: Person, Place   Mode of Communication: Verbal  Speech/Language: Dysarthia  Cognition: Exceptions to WNL  Cognition: Decreased Memory, Decreased Executive Functions, Decreased Attention, Decreased Comprehension  Orientation: Person, Time, Situation  Discharge Recommendations:  (pending pt progress)         Temp:  [95.8 °F (35.4 °C)-98.4 °F (36.9 °C)] 98.4 °F (36.9 °C)  HR:  [66-75] 74  Resp:  [20] 20  BP: (130-142)/(63-80) 132/80  SpO2:  [96 %-97 %] 97 %    Physical Exam    Gen: No acute distress, obese   HEENT: Moist mucus membranes, Normocephalic/Atraumatic  Cardiovascular: Regular rate, rhythm,  Heme/Extr: bilateral LE edema   Pulmonary: Non-labored breathing. Lungs CTAB  : No amezquita  GI:   non-distended. BS+  MSK: ROM is WFL in all extremities.   Integumentary: Skin is warm, dry. .  Neuro: dysarthric R facial droop, RUE weakness > RLE weakness   Psych: Normal mood and affect.       Scheduled Meds:  Current Facility-Administered Medications   Medication Dose Route Frequency Provider Last Rate    acetaminophen  650 mg Oral Q6H PRN Alex S George, DO      aspirin  81 mg Oral Daily Alex S George, DO      atorvastatin  40 mg Oral QPM Alex S George, DO      buPROPion  100 mg Oral BID Alex S George, DO      calcium carbonate  1,000 mg Oral BID PRN Alex S George, DO      clopidogrel  75 mg Oral Daily Alex S George, DO      docusate sodium  100 mg Oral BID Cathy Miranda MD      fluticasone  1 spray Each Nare Daily SAMANTHA Kern      heparin (porcine)  7,500 Units Subcutaneous Q8H MIGEL Alex S George, DO      hydrALAZINE  25 mg Oral Q8H MIGEL Alex S George, DO      insulin lispro  1-5 Units Subcutaneous HS Alex S George, DO      insulin lispro  1-6 Units Subcutaneous TID AC Alex S George, DO      labetalol  300 mg Oral BID Alex S George, DO      lactulose  20 g Oral TID PRN Cathy Miranda MD      loratadine  5 mg Oral Daily SAMANTHA Kern      NIFEdipine  90 mg Oral Daily Alex S George, DO      OLANZapine  10 mg Oral HS Alex S George, DO      ondansetron  4 mg Oral Q6H PRN SAMANTHA Kern      pantoprazole  40 mg Oral Early Morning Alex S George, DO      polyethylene glycol  17 g Oral Daily Cathy Miranda MD      senna  2 tablet Oral HS Cathy Miranda MD      traZODone  50 mg Oral HS PRN Alex S George, DO      Valbenazine Tosylate  40 mg Oral HS Alex S George, DO           Lab Results: I have reviewed the following results:  Results from last 7 days   Lab Units 10/30/24  0601 10/28/24  1022   HEMOGLOBIN g/dL 9.4* 9.8*   HEMATOCRIT % 27.5* 30.0*   WBC Thousand/uL 7.66 6.45   PLATELETS Thousands/uL 192 190     Results from last 7 days   Lab Units 11/01/24  0546 10/30/24  0601  10/28/24  0501   BUN mg/dL 27* 32* 32*   SODIUM mmol/L 138 138 139   POTASSIUM mmol/L 4.7 4.6 3.8   CHLORIDE mmol/L 109* 109* 108   CREATININE mg/dL 3.53* 3.63* 3.55*              Cathy Miranda MD   Physical Medicine and Rehabilitation   11/01/24    I have spent a total time of 36 minutes in caring for this patient on the day of the visit/encounter including Counseling / Coordination of care, Documenting in the medical record, and Communicating with other healthcare professionals .

## 2024-11-01 NOTE — PLAN OF CARE
Problem: SAFETY ADULT  Goal: Patient will remain free of falls  Description: INTERVENTIONS:  - Educate patient/family on patient safety including physical limitations  - Instruct patient to call for assistance with activity   - Consult OT/PT to assist with strengthening/mobility   - Keep Call bell within reach  - Keep bed low and locked with side rails adjusted as appropriate  - Keep care items and personal belongings within reach  - Initiate and maintain comfort rounds  - Make Fall Risk Sign visible to staff  - Offer Toileting every 2 Hours, in advance of need  - Initiate/Maintain bed and chair alarm  - Obtain necessary fall risk management equipment: call bell within reach and side rails up and runctionig  - Apply yellow socks and bracelet for high fall risk patients  - Consider moving patient to room near nurses station  Outcome: Progressing     Problem: NEUROSENSORY - ADULT  Goal: Achieves stable or improved neurological status  Description: INTERVENTIONS  - Monitor and report changes in neurological status  - Monitor vital signs such as temperature, blood pressure, glucose, and any other labs ordered   - Initiate measures to prevent increased intracranial pressure  - Monitor for seizure activity and implement precautions if appropriate      Outcome: Progressing     Problem: RESPIRATORY - ADULT  Goal: Achieves optimal ventilation and oxygenation  Description: INTERVENTIONS:  - Assess for changes in respiratory status  - Assess for changes in mentation and behavior  - Position to facilitate oxygenation and minimize respiratory effort  - Oxygen administered by appropriate delivery if ordered  - Initiate smoking cessation education as indicated  - Encourage broncho-pulmonary hygiene including cough, deep breathe, Incentive Spirometry  - Assess the need for suctioning and aspirate as needed  - Assess and instruct to report SOB or any respiratory difficulty  - Respiratory Therapy support as indicated  Outcome:  Progressing

## 2024-11-01 NOTE — PROGRESS NOTES
11/01/24 1230   Pain Assessment   Pain Assessment Tool 0-10   Pain Score 5   Pain Location/Orientation Orientation: Mid;Location: Back   Restrictions/Precautions   Precautions Bed/chair alarms;Cognitive;Fall Risk;Supervision on toilet/commode;Pain   Weight Bearing Restrictions No   ROM Restrictions No   Sit to Stand   Type of Assistance Needed Physical assistance   Physical Assistance Level 26%-50%   Comment Min / Mod this session due to inc fatigue   Sit to Stand CARE Score 3   Bed-Chair Transfer   Type of Assistance Needed Physical assistance   Physical Assistance Level 26%-50%   Comment Min/ Mod A no device  pt more fatigue this session   Chair/Bed-to-Chair Transfer CARE Score 3   Walk 10 Feet   Type of Assistance Needed Physical assistance   Physical Assistance Level 26%-50%   Comment Min/ Mod  no device   Walk 10 Feet CARE Score 3   Walk 50 Feet with Two Turns   Type of Assistance Needed Physical assistance   Physical Assistance Level 26%-50%   Comment Mod A no device, hands on gt belt   Walk 50 Feet with Two Turns CARE Score 3   Walk 150 Feet   Type of Assistance Needed Physical assistance   Physical Assistance Level Total assistance   Comment Mod A and then needed to call someone for WC to rest- did not make the 150 distance  however earlier in session pt did ambulate 250 feet   Walk 150 Feet CARE Score 1   Ambulation   Primary Mode of Locomotion Prior to Admission Walk   Distance Walked (feet) 250 ft  (125)   Assist Device   (none)   Provided Assistance with: Balance   Walk Assist Level Moderate Assist   Findings Pt did not use device,  pt more fatigue this session so noted more lateral leaning and more R foot scuffing-  at end of session was going to walk back to room but needed to call for WC because pt getting more unsteady and wasnt going to make the distance   Does the patient walk? 2. Yes   Wheel 50 Feet with Two Turns   Reason if not Attempted Activity not applicable   Wheel 50 Feet with Two Turns  CARE Score 9   Wheel 150 Feet   Reason if not Attempted Activity not applicable   Wheel 150 Feet CARE Score 9   4 Steps   Type of Assistance Needed Physical assistance   Physical Assistance Level 26%-50%   Comment Mod A with single HR-  reciprocal pattern but pt needed Mod A for LOB and less control on R with descend   4 Steps CARE Score 3   12 Steps   Comment Pt fatigued at 8 steps   Therapeutic Interventions   Strengthening Seated R LAQ 3#,  Hip flex march AAROM 10x4   Neuromuscular Re-Education step up and throughs at  steps with single rail- attempted unsupported but pt very unsteady 1 set to fatigue,  step throughs with back foot to bottom step 2 sets to fatigue (performed with HHA and cued pt to push down into left hand into therapist for better wt shift to left   Other For walking at end of session trialed therapy shoes (shoes slightly tight so would only use for short periods and only for walking in therapy-  instructed pt to tell fiance to bring shoes in when visiting   Other Comments   Comments /84 mid session   Assessment   Treatment Assessment 60 min skilled PT focused on gait and step throughs and steps,  Pts gait more unsteady this afternoon and at 1 point needed a chair brought up.  Pt is showing large flucuations in performance depending on neuro faitgue- pt had long day of therapy.  This session fatigued quickly on steps and step throughs,finished session with seated TE.   Cont skilled PT on HIGT within tolerance , focus on unsupported gait and balacne and wt shift- pt tends to under wt shift to left , and also delayed wt shift and step response when having LOB.  Fatigues quickly t/o session but striving to participate.  Sits in tilt in space when in room due to fall out of recliner earlier in week.   Problem List Decreased strength;Decreased range of motion;Impaired balance;Decreased endurance;Decreased mobility;Decreased coordination;Decreased safety awareness;Obesity   Barriers to  Discharge Inaccessible home environment;Decreased caregiver support   Plan   Progress Progressing toward goals   PT Therapy Minutes   PT Time In 1230   PT Time Out 1330   PT Total Time (minutes) 60   PT Mode of treatment - Individual (minutes) 60   PT Mode of treatment - Concurrent (minutes) 0   PT Mode of treatment - Group (minutes) 0   PT Mode of treatment - Co-treat (minutes) 0   PT Mode of Treatment - Total time(minutes) 60 minutes   PT Cumulative Minutes 415   Therapy Time missed   Time missed? No

## 2024-11-01 NOTE — PROGRESS NOTES
11/01/24 1330   Pain Assessment   Pain Assessment Tool 0-10   Pain Score 7   Pain Location/Orientation Orientation: Right;Location: Shoulder  (and R lateral pec during horiz ABD PROM to RUE)   Pain Onset/Description Onset: Ongoing;Frequency: Intermittent   Hospital Pain Intervention(s) Repositioned;Rest   Restrictions/Precautions   Precautions Bed/chair alarms;Cognitive;Fall Risk;Impulsive;Supervision on toilet/commode;Visual deficit   Weight Bearing Restrictions No   ROM Restrictions No   Sit to Lying   Type of Assistance Needed Physical assistance   Physical Assistance Level 26%-50%   Comment A for RLE mgmt and trunk positioning   Sit to Lying CARE Score 3   Sit to Stand   Type of Assistance Needed Physical assistance;Verbal cues   Physical Assistance Level 26%-50%   Comment Min-ModA w/fatigue, vc's for hand placement   Sit to Stand CARE Score 3   Bed-Chair Transfer   Type of Assistance Needed Physical assistance;Verbal cues   Physical Assistance Level 26%-50%   Comment SPT w/c>EOB, no AD, with LOB to L side requiring ModA to correct, pt stated it occurred due to max fatigue after PT session   Chair/Bed-to-Chair Transfer CARE Score 3   Therapeutic Exercise - ROM   UE-ROM Yes   ROM- Right Upper Extremities   R Shoulder PROM;Prolonged stretch;Flexion;ABduction;Extension;Horizontal ABduction;External rotation;Internal rotation   R Elbow PROM;Prolonged stretch;Elbow flexion;Elbow extension   R Wrist PROM;Wrist flexion;Wrist extension;Radial deviation;Ulnar deviation   R Hand PROM;Thumb;Index finger;Long finger;Little finger;Ring finger   R Position Supine   R Weight/Reps/Sets 2x10   RUE ROM Comment Supine, PROM provided to RUE proximal to distal, [id number] all planes, for tone reduction, maintaining joint ROM, and neuro re-ed. Pt tolerated but did report 7/10 pain in shoulder and lateral pec during RUE horiz ABD stretch. Pt with significantly increased tone in R shoulder and biceps but improved with repetitive  PROM/prolonged stretching.   Exercise Tools   Exercise Tools Yes   Other Exercise Tool 1 Seated w/Sup, [id number] each of LUE only elbow flexion, shoulder flexion, chest press, rowing, and wrist flexion ext using 4# DB for increased strength during fxl STS /xfers. Pt tolerated well with min vc's for technique.   Cognition   Overall Cognitive Status Impaired   Arousal/Participation Alert;Cooperative   Attention Attends with cues to redirect   Orientation Level Oriented X4   Memory Decreased recall of precautions;Decreased short term memory   Following Commands Follows one step commands with increased time or repetition   Activity Tolerance   Activity Tolerance Patient limited by fatigue   Assessment   Treatment Assessment Pt seen for 60min skilled OT session focused on fxl transfer training, bed mobility, LUE strengthening, RUE PROM/tone reduction, and positioning in bed for increased independence w/ADLs and decreased caregiver burden. See detailed descriptions of fxl performance above. Pt tolerate session but with LOB once during SPT, requiring ModA to correct. Pt very limited by fatigue this session. Pt would benefit from continued skilled OT focused on dynamic sitting/standing balance and RUE neuro re-ed   Prognosis Good   Problem List Decreased strength;Decreased range of motion;Decreased endurance;Impaired balance;Decreased mobility;Decreased coordination;Decreased cognition;Impaired judgement;Decreased safety awareness;Pain;Obesity;Impaired tone   Plan   Treatment/Interventions ADL retraining;Functional transfer training;Therapeutic exercise;Endurance training;Patient/family training;Equipment eval/education;Bed mobility;Compensatory technique education   Progress Progressing toward goals   Discharge Recommendation   Rehab Resource Intensity Level, OT   (pending)   OT Therapy Minutes   OT Time In 1330   OT Time Out 1430   OT Total Time (minutes) 60   OT Mode of treatment - Individual (minutes) 60   OT Mode of  treatment - Concurrent (minutes) 0   OT Mode of treatment - Group (minutes) 0   OT Mode of treatment - Co-treat (minutes) 0   OT Mode of Treatment - Total time(minutes) 60 minutes   OT Cumulative Minutes 510   Therapy Time missed   Time missed? No

## 2024-11-01 NOTE — PROGRESS NOTES
11/01/24 0900   Pain Assessment   Pain Assessment Tool 0-10   Pain Score No Pain   Restrictions/Precautions   Precautions Bed/chair alarms;Supervision on toilet/commode;Cognitive;Fall Risk;Impulsive   Roll Left and Right   Type of Assistance Needed Verbal cues;Incidental touching   Roll Left and Right CARE Score 4   Sit to Lying   Type of Assistance Needed Incidental touching   Sit to Lying CARE Score 4   Lying to Sitting on Side of Bed   Type of Assistance Needed Incidental touching   Lying to Sitting on Side of Bed CARE Score 4   Sit to Stand   Type of Assistance Needed Incidental touching;Verbal cues   Sit to Stand CARE Score 4   Bed-Chair Transfer   Type of Assistance Needed Physical assistance   Physical Assistance Level 25% or less   Comment stand pivot no device   Chair/Bed-to-Chair Transfer CARE Score 3   Walk 10 Feet   Type of Assistance Needed Physical assistance   Physical Assistance Level 25% or less   Walk 10 Feet CARE Score 3   Walk 50 Feet with Two Turns   Type of Assistance Needed Physical assistance   Physical Assistance Level 25% or less   Walk 50 Feet with Two Turns CARE Score 3   Walk 150 Feet   Type of Assistance Needed Physical assistance   Physical Assistance Level 25% or less   Walk 150 Feet CARE Score 3   4 Steps   Type of Assistance Needed Physical assistance   Physical Assistance Level 26%-50%   Comment Left HR   4 Steps CARE Score 3   Therapeutic Interventions   Neuromuscular Re-Education Focused on HIGT no device guarding at the hips, 165ft x 2, 275 x 1 with 1 short standing rest break of 40 sec. Improving with activity tolerance, however requires rest break due to fatigue with each walk.   Other Reviewed right sidelying in bed with Right UE extended vs being tucked under his body   Assessment   Treatment Assessment Pt seen for 30 min skilled therapy session focusing on continued walking for Neuro re-ed. Balance with gait improving, yet continue sto fatigue easily and then will rush  when approaching chair and can have decreased safety requiring cueing and closer guarding.   Plan   Progress Progressing toward goals   PT Therapy Minutes   PT Time In 0900   PT Time Out 0930   PT Total Time (minutes) 30   PT Mode of treatment - Individual (minutes) 30   PT Mode of treatment - Concurrent (minutes) 0   PT Mode of treatment - Group (minutes) 0   PT Mode of treatment - Co-treat (minutes) 0   PT Mode of Treatment - Total time(minutes) 30 minutes   PT Cumulative Minutes 325   Therapy Time missed   Time missed? No

## 2024-11-01 NOTE — ASSESSMENT & PLAN NOTE
Infant seen for developmental intervention. BUE and BLE PROM. Infant required containment and monitored rest breaks. Infant appeared more fussy today. Some beginning feeding readiness with NNS on pacifier. Assessment: some difficulty with handling today. Plan:continue with plan of care.   Lab Results   Component Value Date    HGBA1C 8.6 (H) 10/16/2024       Recent Labs     10/31/24  1612 10/31/24  2116 11/01/24  0551 11/01/24  1117   POCGLU 111 91 91 112     Most recent hemoglobin A1c of 8.6 and technically uncontrolled although blood sugars in the hospital have been well-controlled  Currently on semaglutide as an outpatient but was on hold due to ROSINA in the hospital  Continue sliding scale and 4 times daily Accu-Cheks and as well as a diabetic diet

## 2024-11-01 NOTE — PROGRESS NOTES
Progress Note - Internal Medicine   Name: Tommie Taylor 59 y.o. male I MRN: [id number]  Unit/Bed#: -01 I Date of Admission: 10/26/2024   Date of Service: 11/1/2024 I Hospital Day: 6    Assessment & Plan  CVA (cerebral vascular accident) (HCC)  Initial ED presentation 10/14/2024 with RUE and RLE extremity weakness and R facial droop. CTH and CTA were initially negative for acute process, LKW was 1 hour prior to presentation.   , cardene drip initiated to stabilized BP, TNK administration after stabilization of HTN. Symptoms worsened s/p TNK but f/u CT again demonstrated no acute process.   24 hr post-TNK CT negative.   MRI completed, demonstrated indicated left corona radiata stroke.   DAPT initiated for 3 weeks with plan for transition to asa monotherapy.   Continue ASA 81mg and Plavix 75mg daily until 11/6 then transition to ASA 81mg daily monotherapy.  Cardiology recommends Zio patch after discharge.  Follow up with Neurovascular after discharge.     PT/OT/ST per primary team.   Bipolar I disorder, severe, current or most recent episode depressed, with psychotic features ([facility])  Currently on bupropion 100mg BID and olanzapine 10mg at HS with valbenazine tosylate 40mg for tardive dyskinesia per home regimen. Follow up outpatient with psychiatry.   GERD (gastroesophageal reflux disease)  EGD with GI 10/14, directed to continue with Protonix. Tums available PRN for dyspepsia.   Insomnia  Educate and encourage on sleep hygiene. Continue Trazodone at HS for sleep.   DAISY (obstructive sleep apnea)  Continue use of CPAP with home settings.  Does not use CPAP at home - currently broken.  Recommend overnight oximetry prior to discharge.   Anemia, unspecified  Hgb currently 9.4.  Hemoglobin 12.1 on admission.   Most recent iron panel on 10/15: Iron Sat 38%, TIBC 204, Iron 77, and Ferritin 111.  No need for iron supplementation at this time.  FOBT negative on 10/29.  Continue to trend routine  CBC.  Hyperlipidemia  Most recent lipid panel 10/16/24: Cholesterol 154, triglycerides 120, HDL 40, LDL 90.  Continue Lipitor 40mg daily, increased from home 20mg daily.  Class 3 severe obesity due to excess calories with serious comorbidity and body mass index (BMI) of 40.0 to 44.9 in adult ([facility])  BMI 42.74 on admission. Encourage lifestyle modifications and provide support for nutritional teaching. Consult placed to nutrition services during acute course.   Tardive dyskinesia  Continue on home treatment Valbenazine Tosylate 40mg HS.   Hypertension  Presented with hypertensive emergency on initial hospitalization.   Concerns about medication non-compliance.  Home regimen: labetalol 100mg BID and amlodipine 10mg daily.  Target SBP 140s, currently on labetalol 300mg BID and Nifedipine 90mg daily with hydralazine 25mg Q8 hours.   Nephrology following.   DM2 (diabetes mellitus, type 2) ([facility])  Lab Results   Component Value Date    HGBA1C 8.6 (H) 10/16/2024       Recent Labs     10/31/24  1131 10/31/24  1612 10/31/24  2116 11/01/24  0551   POCGLU 107 111 91 91       Blood Sugar Average: Last 72 hrs:  (P) 115    Continue insulin management with sliding scale.  Pt had been using ozempic for home glucose management but ran out and did not have any other glycemic control agents.     Acute kidney injury superimposed on stage 4 chronic kidney disease ([facility])  Lab Results   Component Value Date    EGFR 17 11/01/2024    EGFR 17 10/30/2024    EGFR 17 10/28/2024    CREATININE 3.53 (H) 11/01/2024    CREATININE 3.63 (H) 10/30/2024    CREATININE 3.55 (H) 10/28/2024     Creatinine currently 3.53.  Baseline creatinine 2.5-2.9.    Avoid nephrotoxins.  Home diuretics currently on hold.  Encourage hydration.  Renal ultrasound demonstrates no hydro but mild bladder wall thickening.   Nephrology following.  Labs MWF per Nephro.  Follows with Dr. oN (Nephrology) as outpatient.  Encephalopathy  Lethargy and encephalopathic symptoms noted  during acute course.  B12 and head CT WNL. No subsequent episodes noted.   EEG on 10/22 consistent with moderate nonspecific cerebral dysfunction, no seizure activity.  Continue to monitor behavior and symptoms for signs of changes.   Most recent MoCA was  in 2024.  Per Psych - if continues to have delirium consider discontinuing Wellbutrin.  Concerns DAISY could also be contributing to encephalopathy per acute care.  Vitamin D deficiency  Vitamin D level <7 on 10/7.  Continue home vitamin D 50,000u weekly.  Continue to follow-up with Nephrology as outpatient.    VTE Pharmacologic Prophylaxis:   Pharmacologic: Heparin  Mechanical VTE Prophylaxis in Place: Yes - sequential compression devices.    Current Length of Stay: 6 day(s)    Current Patient Status: Inpatient Rehab     Discharge Plan: As per primary team.    Code Status: Level 1 - Full Code    Subjective:   Pt examined while pt sitting in recliner in pt room.  Currently fatigued.  Just completed therapy and states that he did very well.  Slept well last night.  Feels that his RUE is looser today and not as tight as yesterday.  Denies any pain or spasms to the RUE.  Denies any headaches, lightheadedness, dizziness, SOB, palpitations, or CP.  Had a BM this morning without any issues.    Objective:     Vitals:   Temp (24hrs), Av °F (36.1 °C), Min:95.8 °F (35.4 °C), Max:98.4 °F (36.9 °C)    Temp:  [95.8 °F (35.4 °C)-98.4 °F (36.9 °C)] 98.4 °F (36.9 °C)  HR:  [66-75] 74  Resp:  [20] 20  BP: (130-151)/(63-77) 130/63  SpO2:  [96 %-97 %] 97 %  Body mass index is 42.74 kg/m².     Review of Systems   Constitutional:  Positive for fatigue. Negative for appetite change, chills and fever.   HENT:  Negative for trouble swallowing.    Eyes:  Negative for visual disturbance.   Respiratory:  Negative for cough, shortness of breath, wheezing and stridor.    Cardiovascular:  Negative for chest pain, palpitations and leg swelling.   Gastrointestinal:  Negative for  abdominal distention, abdominal pain, constipation, diarrhea, nausea and vomiting.        LBM 11/1   Genitourinary:  Negative for difficulty urinating.   Musculoskeletal:  Negative for arthralgias, back pain, gait problem and joint swelling.   Neurological:  Positive for weakness. Negative for dizziness, light-headedness, numbness and headaches.   Psychiatric/Behavioral:  Negative for dysphoric mood and sleep disturbance. The patient is not nervous/anxious.    All other systems reviewed and are negative.       Input and Output Summary (last 24 hours):       Intake/Output Summary (Last 24 hours) at 11/1/2024 0918  Last data filed at 11/1/2024 0552  Gross per 24 hour   Intake 480 ml   Output 2050 ml   Net -1570 ml       Physical Exam:     Physical Exam  Vitals and nursing note reviewed.   Constitutional:       General: He is not in acute distress.     Appearance: Normal appearance. He is obese. He is not ill-appearing.   HENT:      Head: Normocephalic and atraumatic.   Cardiovascular:      Rate and Rhythm: Normal rate and regular rhythm.      Pulses: Normal pulses.      Heart sounds: Normal heart sounds. No murmur heard.     No friction rub.   Pulmonary:      Effort: Pulmonary effort is normal. No respiratory distress.      Breath sounds: Normal breath sounds. No wheezing or rhonchi.   Abdominal:      General: Abdomen is flat. Bowel sounds are normal. There is no distension.      Palpations: Abdomen is soft. There is no mass.      Tenderness: There is no abdominal tenderness. There is no guarding or rebound.      Hernia: No hernia is present.   Musculoskeletal:      Cervical back: Normal range of motion and neck supple. No tenderness.      Right lower leg: Edema (Minimal non-pitting edema) present.      Left lower leg: Edema (Minimal non-pitting edema) present.   Skin:     General: Skin is warm and dry.      Capillary Refill: Capillary refill takes less than 2 seconds.   Neurological:      Mental Status: He is alert  and oriented to person, place, and time.      Motor: Weakness (RUE strength 3/5) present.   Psychiatric:         Mood and Affect: Mood normal.         Behavior: Behavior normal.         Additional Data:     Labs:    Results from last 7 days   Lab Units 10/30/24  0601   WBC Thousand/uL 7.66   HEMOGLOBIN g/dL 9.4*   HEMATOCRIT % 27.5*   PLATELETS Thousands/uL 192   SEGS PCT % 53   LYMPHO PCT % 30   MONO PCT % 12   EOS PCT % 3     Results from last 7 days   Lab Units 11/01/24  0546   SODIUM mmol/L 138   POTASSIUM mmol/L 4.7   CHLORIDE mmol/L 109*   CO2 mmol/L 24   BUN mg/dL 27*   CREATININE mg/dL 3.53*   ANION GAP mmol/L 5   CALCIUM mg/dL 8.6   GLUCOSE RANDOM mg/dL 86         Results from last 7 days   Lab Units 11/01/24  0551 10/31/24  2116 10/31/24  1612 10/31/24  1131 10/31/24  0613 10/30/24  2134 10/30/24  1631 10/30/24  1117 10/30/24  0600 10/29/24  2053 10/29/24  1554 10/29/24  1118   POC GLUCOSE mg/dl 91 91 111 107 154* 113 131 123 96 124 144* 127               Labs reviewed    Imaging:    Imaging reviewed    Recent Cultures (last 7 days):           Last 24 Hours Medication List:   Current Facility-Administered Medications   Medication Dose Route Frequency Provider Last Rate    acetaminophen  650 mg Oral Q6H PRN Alex S George, DO      aspirin  81 mg Oral Daily Alex S George, DO      atorvastatin  40 mg Oral QPM Alex S George, DO      buPROPion  100 mg Oral BID Alex S George, DO      calcium carbonate  1,000 mg Oral BID PRN Alex S George, DO      clopidogrel  75 mg Oral Daily Alex S George, DO      docusate sodium  100 mg Oral BID Cathy Miranda MD      fluticasone  1 spray Each Nare Daily SAMANTHA Kern      heparin (porcine)  7,500 Units Subcutaneous Q8H MIGEL Alex S George, DO      hydrALAZINE  25 mg Oral Q8H MIGEL Alex S George, DO      insulin lispro  1-5 Units Subcutaneous HS Alex S George, DO      insulin lispro  1-6 Units Subcutaneous TID AC Alex S George, DO      labetalol  300 mg Oral BID Alex S  Ariel, DO      lactulose  20 g Oral TID PRN Cathy Miranda MD      loratadine  5 mg Oral Daily SAMANTHA Kern      NIFEdipine  90 mg Oral Daily Alex George, DO      OLANZapine  10 mg Oral HS Alex George, DO      ondansetron  4 mg Oral Q6H PRN SAAMNTHA Kern      pantoprazole  40 mg Oral Early Morning Alex George, DO      polyethylene glycol  17 g Oral Daily Cathy Miranda MD      senna  2 tablet Oral HS Cathy Miranda MD      traZODone  50 mg Oral HS PRN Alex George, DO      Valbenazine Tosylate  40 mg Oral HS Alex George, DO          M*Modal software was used to dictate this note.  It may contain errors with dictating incorrect words or incorrect spelling. Please contact the provider directly with any questions.

## 2024-11-01 NOTE — PLAN OF CARE
Problem: NEUROSENSORY - ADULT  Goal: Achieves stable or improved neurological status  Description: INTERVENTIONS  - Monitor and report changes in neurological status  - Monitor vital signs such as temperature, blood pressure, glucose, and any other labs ordered   - Initiate measures to prevent increased intracranial pressure  - Monitor for seizure activity and implement precautions if appropriate      Outcome: Progressing     Problem: CARDIOVASCULAR - ADULT  Goal: Maintains optimal cardiac output and hemodynamic stability  Description: INTERVENTIONS:  - Monitor I/O, vital signs and rhythm  - Monitor for S/S and trends of decreased cardiac output  - Administer and titrate ordered vasoactive medications to optimize hemodynamic stability  - Assess quality of pulses, skin color and temperature  - Assess for signs of decreased coronary artery perfusion  - Instruct patient to report change in severity of symptoms  Outcome: Progressing

## 2024-11-01 NOTE — ASSESSMENT & PLAN NOTE
Lab Results   Component Value Date    HGBA1C 8.6 (H) 10/16/2024       Recent Labs     10/31/24  1131 10/31/24  1612 10/31/24  2116 11/01/24  0551   POCGLU 107 111 91 91       Blood Sugar Average: Last 72 hrs:  (P) 115    Continue insulin management with sliding scale.  Pt had been using ozempic for home glucose management but ran out and did not have any other glycemic control agents.

## 2024-11-02 LAB
GLUCOSE SERPL-MCNC: 104 MG/DL (ref 65–140)
GLUCOSE SERPL-MCNC: 120 MG/DL (ref 65–140)
GLUCOSE SERPL-MCNC: 143 MG/DL (ref 65–140)
GLUCOSE SERPL-MCNC: 95 MG/DL (ref 65–140)

## 2024-11-02 PROCEDURE — 94003 VENT MGMT INPAT SUBQ DAY: CPT

## 2024-11-02 PROCEDURE — 82948 REAGENT STRIP/BLOOD GLUCOSE: CPT

## 2024-11-02 RX ADMIN — BUPROPION HYDROCHLORIDE 100 MG: 100 TABLET, FILM COATED ORAL at 08:44

## 2024-11-02 RX ADMIN — HYDRALAZINE HYDROCHLORIDE 25 MG: 25 TABLET ORAL at 14:12

## 2024-11-02 RX ADMIN — HYDRALAZINE HYDROCHLORIDE 25 MG: 25 TABLET ORAL at 05:25

## 2024-11-02 RX ADMIN — ATORVASTATIN CALCIUM 40 MG: 40 TABLET, FILM COATED ORAL at 17:49

## 2024-11-02 RX ADMIN — HEPARIN SODIUM 7500 UNITS: 5000 INJECTION INTRAVENOUS; SUBCUTANEOUS at 05:26

## 2024-11-02 RX ADMIN — FLUTICASONE PROPIONATE 1 SPRAY: 50 SPRAY, METERED NASAL at 08:43

## 2024-11-02 RX ADMIN — HEPARIN SODIUM 7500 UNITS: 5000 INJECTION INTRAVENOUS; SUBCUTANEOUS at 21:08

## 2024-11-02 RX ADMIN — VALBENAZINE 40 MG: 40 CAPSULE ORAL at 21:09

## 2024-11-02 RX ADMIN — HYDRALAZINE HYDROCHLORIDE 25 MG: 25 TABLET ORAL at 21:08

## 2024-11-02 RX ADMIN — NIFEDIPINE 90 MG: 30 TABLET, FILM COATED, EXTENDED RELEASE ORAL at 08:44

## 2024-11-02 RX ADMIN — HEPARIN SODIUM 7500 UNITS: 5000 INJECTION INTRAVENOUS; SUBCUTANEOUS at 14:12

## 2024-11-02 RX ADMIN — LABETALOL HYDROCHLORIDE 300 MG: 100 TABLET, FILM COATED ORAL at 08:44

## 2024-11-02 RX ADMIN — ASPIRIN 81 MG: 81 TABLET, COATED ORAL at 08:44

## 2024-11-02 RX ADMIN — CLOPIDOGREL BISULFATE 75 MG: 75 TABLET ORAL at 08:44

## 2024-11-02 RX ADMIN — DOCUSATE SODIUM 100 MG: 100 CAPSULE, LIQUID FILLED ORAL at 17:49

## 2024-11-02 RX ADMIN — BUPROPION HYDROCHLORIDE 100 MG: 100 TABLET, FILM COATED ORAL at 17:49

## 2024-11-02 RX ADMIN — OLANZAPINE 10 MG: 2.5 TABLET, FILM COATED ORAL at 21:08

## 2024-11-02 RX ADMIN — LABETALOL HYDROCHLORIDE 300 MG: 100 TABLET, FILM COATED ORAL at 21:07

## 2024-11-02 RX ADMIN — SENNOSIDES 17.2 MG: 8.6 TABLET, FILM COATED ORAL at 21:08

## 2024-11-02 RX ADMIN — PANTOPRAZOLE SODIUM 40 MG: 40 TABLET, DELAYED RELEASE ORAL at 05:26

## 2024-11-02 RX ADMIN — LORATADINE 5 MG: 10 TABLET ORAL at 08:44

## 2024-11-03 LAB
GLUCOSE SERPL-MCNC: 103 MG/DL (ref 65–140)
GLUCOSE SERPL-MCNC: 137 MG/DL (ref 65–140)
GLUCOSE SERPL-MCNC: 143 MG/DL (ref 65–140)
GLUCOSE SERPL-MCNC: 158 MG/DL (ref 65–140)

## 2024-11-03 PROCEDURE — 97110 THERAPEUTIC EXERCISES: CPT

## 2024-11-03 PROCEDURE — 97535 SELF CARE MNGMENT TRAINING: CPT

## 2024-11-03 PROCEDURE — 97116 GAIT TRAINING THERAPY: CPT

## 2024-11-03 PROCEDURE — 82948 REAGENT STRIP/BLOOD GLUCOSE: CPT

## 2024-11-03 PROCEDURE — 94660 CPAP INITIATION&MGMT: CPT

## 2024-11-03 PROCEDURE — 97112 NEUROMUSCULAR REEDUCATION: CPT

## 2024-11-03 PROCEDURE — 97530 THERAPEUTIC ACTIVITIES: CPT

## 2024-11-03 RX ADMIN — VALBENAZINE 40 MG: 40 CAPSULE ORAL at 21:24

## 2024-11-03 RX ADMIN — ATORVASTATIN CALCIUM 40 MG: 40 TABLET, FILM COATED ORAL at 17:19

## 2024-11-03 RX ADMIN — HYDRALAZINE HYDROCHLORIDE 25 MG: 25 TABLET ORAL at 14:19

## 2024-11-03 RX ADMIN — HYDRALAZINE HYDROCHLORIDE 25 MG: 25 TABLET ORAL at 21:22

## 2024-11-03 RX ADMIN — LABETALOL HYDROCHLORIDE 300 MG: 100 TABLET, FILM COATED ORAL at 08:18

## 2024-11-03 RX ADMIN — OLANZAPINE 10 MG: 2.5 TABLET, FILM COATED ORAL at 21:22

## 2024-11-03 RX ADMIN — PANTOPRAZOLE SODIUM 40 MG: 40 TABLET, DELAYED RELEASE ORAL at 05:34

## 2024-11-03 RX ADMIN — LABETALOL HYDROCHLORIDE 300 MG: 100 TABLET, FILM COATED ORAL at 21:22

## 2024-11-03 RX ADMIN — SENNOSIDES 17.2 MG: 8.6 TABLET, FILM COATED ORAL at 21:22

## 2024-11-03 RX ADMIN — BUPROPION HYDROCHLORIDE 100 MG: 100 TABLET, FILM COATED ORAL at 08:17

## 2024-11-03 RX ADMIN — HEPARIN SODIUM 7500 UNITS: 5000 INJECTION INTRAVENOUS; SUBCUTANEOUS at 14:19

## 2024-11-03 RX ADMIN — FLUTICASONE PROPIONATE 1 SPRAY: 50 SPRAY, METERED NASAL at 08:17

## 2024-11-03 RX ADMIN — LORATADINE 5 MG: 10 TABLET ORAL at 08:17

## 2024-11-03 RX ADMIN — NIFEDIPINE 90 MG: 30 TABLET, FILM COATED, EXTENDED RELEASE ORAL at 08:18

## 2024-11-03 RX ADMIN — HEPARIN SODIUM 7500 UNITS: 5000 INJECTION INTRAVENOUS; SUBCUTANEOUS at 05:34

## 2024-11-03 RX ADMIN — HEPARIN SODIUM 7500 UNITS: 5000 INJECTION INTRAVENOUS; SUBCUTANEOUS at 21:20

## 2024-11-03 RX ADMIN — ASPIRIN 81 MG: 81 TABLET, COATED ORAL at 08:17

## 2024-11-03 RX ADMIN — CLOPIDOGREL BISULFATE 75 MG: 75 TABLET ORAL at 08:17

## 2024-11-03 RX ADMIN — BUPROPION HYDROCHLORIDE 100 MG: 100 TABLET, FILM COATED ORAL at 17:19

## 2024-11-03 RX ADMIN — HYDRALAZINE HYDROCHLORIDE 25 MG: 25 TABLET ORAL at 05:34

## 2024-11-03 NOTE — PROGRESS NOTES
11/03/24 0830   Pain Assessment   Pain Assessment Tool 0-10   Pain Score No Pain   Restrictions/Precautions   Precautions Bed/chair alarms;Cognitive;Fall Risk;Supervision on toilet/commode   Weight Bearing Restrictions No   ROM Restrictions No   Cognition   Arousal/Participation Cooperative   Subjective   Subjective Pt reporting not sleeping well but that his family has brought his sneakers to try in PT.   Sit to Lying   Type of Assistance Needed Incidental touching   Sit to Lying CARE Score 4   Lying to Sitting on Side of Bed   Type of Assistance Needed Incidental touching   Lying to Sitting on Side of Bed CARE Score 4   Sit to Stand   Type of Assistance Needed Physical assistance   Physical Assistance Level 25% or less   Sit to Stand CARE Score 3   Bed-Chair Transfer   Type of Assistance Needed Physical assistance   Physical Assistance Level 26%-50%   Comment SPT with min-modA without AD from bed <> WC   Chair/Bed-to-Chair Transfer CARE Score 3   Walk 10 Feet   Type of Assistance Needed Physical assistance   Physical Assistance Level 26%-50%   Walk 10 Feet CARE Score 3   Walk 50 Feet with Two Turns   Type of Assistance Needed Physical assistance   Physical Assistance Level 26%-50%   Walk 50 Feet with Two Turns CARE Score 3   Walk 150 Feet   Reason if not Attempted Safety concerns   Walk 150 Feet CARE Score 88   Walking 10 Feet on Uneven Surfaces   Type of Assistance Needed Physical assistance   Physical Assistance Level 26%-50%   Comment no AD on uneven mat surface with modA   Walking 10 Feet on Uneven Surfaces CARE Score 3   Ambulation   Primary Mode of Locomotion Prior to Admission Walk   Distance Walked (feet) 50 ft  (30-50ftx4)   Assist Device   (none)   Gait Pattern Decreased foot clearance;R foot drag;R hemiparesis;Lateral deviation;Step through;Decreased R stance;Improper weight shift   Limitations Noted In Balance;Endurance;Heel Strike;Safety;Sequencing;Strength;Speed   Provided Assistance with:  "Balance;Weight Shift   Walk Assist Level Minimum Assist;Moderate Assist   Findings Trialed shoes today. Pt requesting to initially ambulate with L HR 30ft x3 with Patria before having the confidence to ambulate 30-50ftx4 without AD with modA for balance and weightshifting. Fwd/bwd at L rail 30ftx2 in each direction.   Does the patient walk? 2. Yes   Curb or Single Stair   Style negotiated Single stair   Type of Assistance Needed Physical assistance   Physical Assistance Level 26%-50%   1 Step (Curb) CARE Score 3   4 Steps   Type of Assistance Needed Physical assistance   Physical Assistance Level 26%-50%   4 Steps CARE Score 3   12 Steps   Reason if not Attempted Safety concerns   12 Steps CARE Score 88   Stairs   Type Stairs   # of Steps 4  (x2)   Assist Devices Single Rail   Findings Negotiated 4-6\"  steps x2 with L UE on L HR with nonreciprocal gait with min-modAx1   Therapeutic Interventions   Strengthening Seated R hip flx AAROM and R LAQ with 3# 10x4   Neuromuscular Re-Education Ambulation at L HR 30ftx2 in each direction fwd/bwd and laterally with Patria. Stepping over 3 canes x 3 with L HR. Lewis steps step throughs with R LE on 6\" step with L HR 5x2. R step taps to 6\" step x10   Assessment   Treatment Assessment Pt tolerated 90 minute PT session focusing on mobility and safety. He was able to perform supine <> sit with CGA for safety. He performed STS and SPT without AD with min-modAx1 to safely pivot to WC; requires cues on full turning and eccentric control when pivoting and sitting. Therapist donned b/l shoes to trial. Pt preferred trialing ambulation at L HR initially but progressed to ambulation up to 50ft x4 without AD with min-modA for balance and weight shifting. He fatigued quickly and required repeated short rest breaks. Multiple short bouts of fwd/bwd and lateral ambulation supported at L HR. Pt requires assistance for R LE clearance when stepping over objects (e.g. cane) or when descending " steps due to weakness. Pt is progressing towards goals to promote safe d/c home.   Problem List Decreased strength;Decreased endurance;Impaired balance;Decreased mobility;Decreased cognition;Impaired judgement;Decreased safety awareness   Barriers to Discharge Inaccessible home environment   PT Barriers   Physical Impairment Decreased strength;Decreased endurance;Impaired balance;Decreased mobility;Decreased cognition;Decreased safety awareness   Functional Limitation Car transfers;Stair negotiation;Standing;Transfers;Walking   Plan   Treatment/Interventions Functional transfer training;LE strengthening/ROM;Elevations;Endurance training;Therapeutic exercise;Patient/family training;Bed mobility;Gait training   Progress Progressing toward goals   PT Therapy Minutes   PT Time In 0830   PT Time Out 1000   PT Total Time (minutes) 90   PT Mode of treatment - Individual (minutes) 90   PT Mode of treatment - Concurrent (minutes) 0   PT Mode of treatment - Group (minutes) 0   PT Mode of treatment - Co-treat (minutes) 0   PT Mode of Treatment - Total time(minutes) 90 minutes   PT Cumulative Minutes 505   Therapy Time missed   Time missed? No

## 2024-11-03 NOTE — PROGRESS NOTES
11/03/24 1230   Pain Assessment   Pain Assessment Tool 0-10   Pain Score No Pain   Restrictions/Precautions   Precautions Bed/chair alarms;Cognitive;Fall Risk;Supervision on toilet/commode   Weight Bearing Restrictions No   ROM Restrictions No   Lifestyle   Autonomy Pt asleep in bed upon OT arrival.   Tub/Shower Transfer   Limitations Noted In Balance;Coordination;Safety;LE Strength   Adaptive Equipment Grab Bars;Transfer Bench   Assessed Shower   Findings (S)  Patria SPT with use of grab bar to transfer bench; Pt is interested in showering for next ADL - will need a shower order.   Sit to Lying   Type of Assistance Needed Supervision   Physical Assistance Level No physical assistance   Comment CS with cues for positioning   Sit to Lying CARE Score 4   Lying to Sitting on Side of Bed   Type of Assistance Needed Incidental touching   Physical Assistance Level No physical assistance   Comment HOB flat   Lying to Sitting on Side of Bed CARE Score 4   Sit to Stand   Type of Assistance Needed Physical assistance   Physical Assistance Level 25% or less   Comment Patria STS no AD   Sit to Stand CARE Score 3   Bed-Chair Transfer   Type of Assistance Needed Physical assistance   Physical Assistance Level 25% or less   Comment Patria SPT no AD - Patria no AD mobility bed <> bathroom; Use of gaitbelt   Chair/Bed-to-Chair Transfer CARE Score 3   Cognition   Overall Cognitive Status Impaired   Arousal/Participation Responsive   Attention Attends with cues to redirect   Orientation Level Oriented X4   Memory Decreased short term memory;Decreased recall of precautions;Decreased recall of recent events   Following Commands Follows one step commands with increased time or repetition   Activity Tolerance   Activity Tolerance Patient limited by fatigue   Medical Staff Made Aware Fatigue following PT session - agreeable to assess shower transfer and complete mobility to bathroom.   Assessment   Treatment Assessment Pt seen for brief 15 min  skilled OT Tx session with focus on fxnl mobility to bathroom and trialing dry shower transfer. See above for further Tx details. Upon entering pt's room for OT Tx session, pt asleep in bed stating fatigue from PT session prior to lunch. Agreeable to complete mobility to/from bathroom at Patria level, use of gait belt. Trialed dry shower transfer to assess safety with transfer and sitting balance. Pt completed SPT into dry shower transfer at Patria with use of grab bar. Sitting balance varies depending upon level of fatigue. Pt expressed interest in showering, will need a shower order, if appropriate. Continue POC.   Prognosis Good   Problem List Decreased strength;Decreased range of motion;Decreased endurance;Impaired balance;Decreased mobility;Decreased coordination;Decreased cognition;Impaired judgement;Decreased safety awareness   Barriers to Discharge Inaccessible home environment   Plan   Treatment/Interventions ADL retraining;Functional transfer training;Therapeutic exercise;Endurance training;Patient/family training;Bed mobility   Progress Progressing toward goals   Discharge Recommendation   Rehab Resource Intensity Level, OT   (Pending)   OT Therapy Minutes   OT Time In 1230   OT Time Out 1245   OT Total Time (minutes) 15   OT Mode of treatment - Individual (minutes) 15   OT Mode of treatment - Concurrent (minutes) 0   OT Mode of treatment - Group (minutes) 0   OT Mode of treatment - Co-treat (minutes) 0   OT Mode of Treatment - Total time(minutes) 15 minutes   OT Cumulative Minutes 525   Therapy Time missed   Time missed? Yes, 45 mins - pt is on for best practice today, did not affect his therapy minutes.

## 2024-11-03 NOTE — NURSING NOTE
Patient was eating chinese food before his blood sugar was done which turned out at 158 did not cover it due to it was not before eating

## 2024-11-03 NOTE — PLAN OF CARE
Problem: SAFETY ADULT  Goal: Patient will remain free of falls  Description: INTERVENTIONS:  - Educate patient/family on patient safety including physical limitations  - Instruct patient to call for assistance with activity   - Consult OT/PT to assist with strengthening/mobility   - Keep Call bell within reach  - Keep bed low and locked with side rails adjusted as appropriate  - Keep care items and personal belongings within reach  - Initiate and maintain comfort rounds  - Make Fall Risk Sign visible to staff  - Offer Toileting every 2-4 Hours, in advance of need  - Initiate/Maintain bed/chair alarms.   - Apply yellow socks and bracelet for high fall risk patients  - Consider moving patient to room near nurses station  Outcome: Progressing

## 2024-11-03 NOTE — PLAN OF CARE
Problem: SAFETY ADULT  Goal: Patient will remain free of falls  Description: INTERVENTIONS:  - Educate patient/family on patient safety including physical limitations  - Instruct patient to call for assistance with activity   - Consult OT/PT to assist with strengthening/mobility   - Keep Call bell within reach  - Keep bed low and locked with side rails adjusted as appropriate  - Keep care items and personal belongings within reach  - Initiate and maintain comfort rounds  - Make Fall Risk Sign visible to staff  - Offer Toileting every 4 Hours, in advance of need  - Initiate/Maintain  alarm  - Obtain necessary fall risk management equipment: wc estefania walker  - Apply yellow socks and bracelet for high fall risk patients  - Consider moving patient to room near nurses station  Outcome: Progressing

## 2024-11-04 ENCOUNTER — APPOINTMENT (INPATIENT)
Dept: CT IMAGING | Facility: HOSPITAL | Age: 59
DRG: 056 | End: 2024-11-04
Payer: MEDICARE

## 2024-11-04 ENCOUNTER — TELEPHONE (OUTPATIENT)
Age: 59
End: 2024-11-04

## 2024-11-04 LAB
ANION GAP SERPL CALCULATED.3IONS-SCNC: 6 MMOL/L (ref 4–13)
BASOPHILS # BLD AUTO: 0.06 THOUSANDS/ΜL (ref 0–0.1)
BASOPHILS NFR BLD AUTO: 1 % (ref 0–1)
BUN SERPL-MCNC: 29 MG/DL (ref 5–25)
CALCIUM SERPL-MCNC: 8.4 MG/DL (ref 8.4–10.2)
CHLORIDE SERPL-SCNC: 108 MMOL/L (ref 96–108)
CO2 SERPL-SCNC: 24 MMOL/L (ref 21–32)
CREAT SERPL-MCNC: 3.66 MG/DL (ref 0.6–1.3)
EOSINOPHIL # BLD AUTO: 0.22 THOUSAND/ΜL (ref 0–0.61)
EOSINOPHIL NFR BLD AUTO: 3 % (ref 0–6)
ERYTHROCYTE [DISTWIDTH] IN BLOOD BY AUTOMATED COUNT: 14.6 % (ref 11.6–15.1)
GFR SERPL CREATININE-BSD FRML MDRD: 17 ML/MIN/1.73SQ M
GLUCOSE P FAST SERPL-MCNC: 115 MG/DL (ref 65–99)
GLUCOSE SERPL-MCNC: 104 MG/DL (ref 65–140)
GLUCOSE SERPL-MCNC: 106 MG/DL (ref 65–140)
GLUCOSE SERPL-MCNC: 115 MG/DL (ref 65–140)
GLUCOSE SERPL-MCNC: 120 MG/DL (ref 65–140)
GLUCOSE SERPL-MCNC: 169 MG/DL (ref 65–140)
HCT VFR BLD AUTO: 28.3 % (ref 36.5–49.3)
HEMOCCULT STL QL: NEGATIVE
HEMOCCULT STL QL: NORMAL
HEMOCCULT STL QL: NORMAL
HGB BLD-MCNC: 9.1 G/DL (ref 12–17)
IMM GRANULOCYTES # BLD AUTO: 0.03 THOUSAND/UL (ref 0–0.2)
IMM GRANULOCYTES NFR BLD AUTO: 1 % (ref 0–2)
LYMPHOCYTES # BLD AUTO: 1.81 THOUSANDS/ΜL (ref 0.6–4.47)
LYMPHOCYTES NFR BLD AUTO: 28 % (ref 14–44)
MAGNESIUM SERPL-MCNC: 1.5 MG/DL (ref 1.9–2.7)
MCH RBC QN AUTO: 26.5 PG (ref 26.8–34.3)
MCHC RBC AUTO-ENTMCNC: 32.2 G/DL (ref 31.4–37.4)
MCV RBC AUTO: 83 FL (ref 82–98)
MONOCYTES # BLD AUTO: 0.73 THOUSAND/ΜL (ref 0.17–1.22)
MONOCYTES NFR BLD AUTO: 11 % (ref 4–12)
NEUTROPHILS # BLD AUTO: 3.56 THOUSANDS/ΜL (ref 1.85–7.62)
NEUTS SEG NFR BLD AUTO: 56 % (ref 43–75)
NRBC BLD AUTO-RTO: 0 /100 WBCS
PHOSPHATE SERPL-MCNC: 4 MG/DL (ref 2.7–4.5)
PLATELET # BLD AUTO: 167 THOUSANDS/UL (ref 149–390)
PMV BLD AUTO: 10.2 FL (ref 8.9–12.7)
POTASSIUM SERPL-SCNC: 4.8 MMOL/L (ref 3.5–5.3)
RBC # BLD AUTO: 3.43 MILLION/UL (ref 3.88–5.62)
SODIUM SERPL-SCNC: 138 MMOL/L (ref 135–147)
WBC # BLD AUTO: 6.41 THOUSAND/UL (ref 4.31–10.16)

## 2024-11-04 PROCEDURE — 85025 COMPLETE CBC W/AUTO DIFF WBC: CPT | Performed by: NURSE PRACTITIONER

## 2024-11-04 PROCEDURE — 97530 THERAPEUTIC ACTIVITIES: CPT

## 2024-11-04 PROCEDURE — 97116 GAIT TRAINING THERAPY: CPT

## 2024-11-04 PROCEDURE — 99232 SBSQ HOSP IP/OBS MODERATE 35: CPT | Performed by: INTERNAL MEDICINE

## 2024-11-04 PROCEDURE — 82272 OCCULT BLD FECES 1-3 TESTS: CPT | Performed by: INTERNAL MEDICINE

## 2024-11-04 PROCEDURE — 83735 ASSAY OF MAGNESIUM: CPT | Performed by: NURSE PRACTITIONER

## 2024-11-04 PROCEDURE — 97110 THERAPEUTIC EXERCISES: CPT

## 2024-11-04 PROCEDURE — 84100 ASSAY OF PHOSPHORUS: CPT | Performed by: NURSE PRACTITIONER

## 2024-11-04 PROCEDURE — 97130 THER IVNTJ EA ADDL 15 MIN: CPT

## 2024-11-04 PROCEDURE — 94660 CPAP INITIATION&MGMT: CPT

## 2024-11-04 PROCEDURE — 97112 NEUROMUSCULAR REEDUCATION: CPT

## 2024-11-04 PROCEDURE — 70450 CT HEAD/BRAIN W/O DYE: CPT

## 2024-11-04 PROCEDURE — 82948 REAGENT STRIP/BLOOD GLUCOSE: CPT

## 2024-11-04 PROCEDURE — 94003 VENT MGMT INPAT SUBQ DAY: CPT

## 2024-11-04 PROCEDURE — 97535 SELF CARE MNGMENT TRAINING: CPT

## 2024-11-04 PROCEDURE — 92507 TX SP LANG VOICE COMM INDIV: CPT

## 2024-11-04 PROCEDURE — 97129 THER IVNTJ 1ST 15 MIN: CPT

## 2024-11-04 PROCEDURE — 80048 BASIC METABOLIC PNL TOTAL CA: CPT | Performed by: NURSE PRACTITIONER

## 2024-11-04 PROCEDURE — 94760 N-INVAS EAR/PLS OXIMETRY 1: CPT

## 2024-11-04 RX ADMIN — DOCUSATE SODIUM 100 MG: 100 CAPSULE, LIQUID FILLED ORAL at 17:00

## 2024-11-04 RX ADMIN — TRAZODONE HYDROCHLORIDE 50 MG: 50 TABLET ORAL at 00:19

## 2024-11-04 RX ADMIN — FLUTICASONE PROPIONATE 1 SPRAY: 50 SPRAY, METERED NASAL at 08:47

## 2024-11-04 RX ADMIN — PANTOPRAZOLE SODIUM 40 MG: 40 TABLET, DELAYED RELEASE ORAL at 05:02

## 2024-11-04 RX ADMIN — SENNOSIDES 17.2 MG: 8.6 TABLET, FILM COATED ORAL at 22:13

## 2024-11-04 RX ADMIN — BUPROPION HYDROCHLORIDE 100 MG: 100 TABLET, FILM COATED ORAL at 08:46

## 2024-11-04 RX ADMIN — OLANZAPINE 10 MG: 2.5 TABLET, FILM COATED ORAL at 22:12

## 2024-11-04 RX ADMIN — HYDRALAZINE HYDROCHLORIDE 25 MG: 25 TABLET ORAL at 14:03

## 2024-11-04 RX ADMIN — HEPARIN SODIUM 7500 UNITS: 5000 INJECTION INTRAVENOUS; SUBCUTANEOUS at 14:03

## 2024-11-04 RX ADMIN — LABETALOL HYDROCHLORIDE 300 MG: 100 TABLET, FILM COATED ORAL at 22:12

## 2024-11-04 RX ADMIN — LORATADINE 5 MG: 10 TABLET ORAL at 08:46

## 2024-11-04 RX ADMIN — VALBENAZINE 40 MG: 40 CAPSULE ORAL at 22:13

## 2024-11-04 RX ADMIN — CLOPIDOGREL BISULFATE 75 MG: 75 TABLET ORAL at 08:46

## 2024-11-04 RX ADMIN — INSULIN LISPRO 1 UNITS: 100 INJECTION, SOLUTION INTRAVENOUS; SUBCUTANEOUS at 11:43

## 2024-11-04 RX ADMIN — HEPARIN SODIUM 7500 UNITS: 5000 INJECTION INTRAVENOUS; SUBCUTANEOUS at 05:02

## 2024-11-04 RX ADMIN — ASPIRIN 81 MG: 81 TABLET, COATED ORAL at 08:46

## 2024-11-04 RX ADMIN — HEPARIN SODIUM 7500 UNITS: 5000 INJECTION INTRAVENOUS; SUBCUTANEOUS at 22:13

## 2024-11-04 RX ADMIN — HYDRALAZINE HYDROCHLORIDE 25 MG: 25 TABLET ORAL at 05:03

## 2024-11-04 RX ADMIN — LABETALOL HYDROCHLORIDE 300 MG: 100 TABLET, FILM COATED ORAL at 08:47

## 2024-11-04 RX ADMIN — HYDRALAZINE HYDROCHLORIDE 25 MG: 25 TABLET ORAL at 22:13

## 2024-11-04 RX ADMIN — ATORVASTATIN CALCIUM 40 MG: 40 TABLET, FILM COATED ORAL at 17:00

## 2024-11-04 RX ADMIN — NIFEDIPINE 90 MG: 30 TABLET, FILM COATED, EXTENDED RELEASE ORAL at 08:46

## 2024-11-04 RX ADMIN — LACTULOSE 20 G: 20 SOLUTION ORAL at 11:46

## 2024-11-04 RX ADMIN — BUPROPION HYDROCHLORIDE 100 MG: 100 TABLET, FILM COATED ORAL at 17:00

## 2024-11-04 RX ADMIN — DOCUSATE SODIUM 100 MG: 100 CAPSULE, LIQUID FILLED ORAL at 08:46

## 2024-11-04 NOTE — PROGRESS NOTES
Progress Note - PMR   Name: Tommie Taylor 59 y.o. male I MRN: [id number]  Unit/Bed#: [facility] 262-01 I Date of Admission: 10/26/2024   Date of Service: 11/4/2024 I Hospital Day: 9     Assessment & Plan  CVA (cerebral vascular accident) (HCC)  Patient with uncontrolled hypertension and diabetes presents with right upper and right lower extremity weakness as well as facial droop  A CT of the head as well as a CTA of the head and neck were completed with negative for acute abnormalities for an acute process  TNK had been administered following the correction of his hypertension after being placed on a Cardene drip  After ministration of the TNK developed worsening dysarthria as well as headache and a repeat CT was still negative.  The 24 post TNK CT was also negative  Neurology followed the patient and MRI was completed and was significant for left corona radiata stroke  Placed on dual antiplatelet therapy for 3 weeks with plans for monotherapy with aspirin and statin indefinitely for secondary stroke prophylaxis  Recommendation for a Zio patch as an outpatient  Follow-up with neurovascular attending in 6 to 8 weeks  Physical, occupational and speech therapy while on the acute rehabilitation unit  Bipolar I disorder, severe, current or most recent episode depressed, with psychotic features ([facility])  History of chronic bipolar 1 disorder and follows with outpatient psychiatry and also was seen inpatient  Mood has been stable and is maintained on Zyprexa, bupropion and trazodone as well as Ingrezza for tardive dyskinesia  Follow-up with psychiatry as an outpatient and consider virtual consultation if indicated for any changes while on ARC  GERD (gastroesophageal reflux disease)  Continue Protonix as well as as needed Tums  Insomnia  Continue trazodone and consider sleep logs  DAISY (obstructive sleep apnea)  Continue CPAP with home settings  Ordered and compliant per records  Anemia, unspecified  Hemoglobin most recently of  10.4 which is up from 9.3 but has been hovering in the 10-11 range for most of admission  Continue to monitor with biweekly CBC or sooner if clinically indicated  Hyperlipidemia  Continue Lipitor 40 mg every evening  Class 3 severe obesity due to excess calories with serious comorbidity and body mass index (BMI) of 40.0 to 44.9 in adult ([facility])  Continue with exercise and promote lifestyle modification, weight loss counseling and management of medical conditions to optimize status  Tardive dyskinesia  Continue Ingrezza 40 mg at bedtime  Hypertension  Presented to the hospital significantly elevated blood pressure with systolic ranging from 197-231  Was placed on a Cardizem drip initially for hypertensive emergency  Had been on Demadex 20 mg daily but was on hold due to the increasing creatinine  Cw labetalol 300 mg BID , nifedipine 90 mg daily and hydralazine 25mg Q8h  Goals for normotension  Mgmt per IM  Follow-up with nephrology as an outpatient  DM2 (diabetes mellitus, type 2) ([facility])  Lab Results   Component Value Date    HGBA1C 8.6 (H) 10/16/2024       Recent Labs     11/03/24  1145 11/03/24  1604 11/03/24 2034 11/04/24  0557   POCGLU 137 158* 143* 120     Most recent hemoglobin A1c of 8.6 and technically uncontrolled although blood sugars in the hospital have been well-controlled  Currently on semaglutide as an outpatient but was on hold due to ROSINA in the hospital  Continue sliding scale and 4 times daily Accu-Cheks and as well as a diabetic diet    Acute kidney injury superimposed on stage 4 chronic kidney disease ([facility])  Lab Results   Component Value Date    EGFR 17 11/04/2024    EGFR 17 11/01/2024    EGFR 17 10/30/2024    CREATININE 3.66 (H) 11/04/2024    CREATININE 3.53 (H) 11/01/2024    CREATININE 3.63 (H) 10/30/2024     Recent creatinine of 3.63 10/30 Prior baseline around 2.9-3.0  Etiology felt to be related potentially to ATN in the setting of uncontrolled hypertension and complicated by contrast  associated nephropathy  Nephrology was consulted and followed and a UA showed microhematuria and proteinuria.  An ultrasound of the kidney/bladder showed wall thickening but no hydronephrosis  Bladder scans negative for retention  Monitor BMP triweekly or sooner if clinically indicated  Demadex has been on hold and had periodically required IV fluids  Nephrology following  Follow with nephrology as an outpatient or sooner on the ARC if any acute changes  Encephalopathy  Had lethargy on exam back on 10/17 in acute care with improvements however more recently had issues with confusion and decreased consciousness in the mornings that improves throughout the day with unclear etiology  Prior history of cognitive impairments with last MoCA score of 18  CT of the head was stable, B12 level (446) wnl  Was evaluated by psychiatry with no changes in medications recommended  EEG showed no epileptiform activity just moderate nonspecific dysfunction  Will need to monitor especially with change in environment now on the ARC for any changes  Impaired mobility and activities of daily living  Patient was evaluated by the rehabilitation team MD and an appropriate candidate for acute inpatient rehabilitation program at this time.  The patient will tolerate 3 hours/day 5 to 7 days/week of intensive physical, occupational and speech therapy in order to obtain goals for community discharge  Due to the patient's functional Compared to their baseline level of function in addition to their ongoing medical needs, the patient would benefit from daily supervision from a rehabilitation physician as well as rehabilitation nursing to implement and adjust the medical as well as functional plan of care in order to meet the patient's goals.  DC: TBD reteam   At risk for alteration in bowel function  Constipated no bm for several days; lactulose PRN  Start colase, senna daily and miralax prn  Titrate as needed   Overgrown toenails  Cs podiatry    Vitamin D deficiency  <7 10/7  Mgmt per IM/nephrology   Fall during current hospitalization  Patient with fall to knees at shift change 10/31   Denied any knee pain  Get larger bariatric chair   Fall precautions.     History of Present Illness   Tommie Taylor is a 59 y.o. male with history of bipolar disorder, CKD stage IV, type 2 diabetes, hypertension, tardive dyskinesia, sleep apnea, GERD who presented to the [facility] on 10/14/2024 for right-sided weakness and facial droop.  MRI revealed a left corona radiata infarct and neurology was consulted and was administered TNK.  He initially required a Cardene drip for his hypertensive emergency and was eventually placed on dual antiplatelet therapy with plan to continue aspirin and Plavix until that date and continue with aspirin as monotherapy as well as atorvastatin for secondary stroke prophylaxis.  He was recommended for Zio patch placement as an outpatient.  His hospital course was complicated by acute kidney injury on top of his CKD thought to be related to ATN in the setting of uncontrolled blood pressure as well as contrast associated nephropathy.  Nephrology did follow during his course and had some levels of improvement however has not returned back to his baseline level of creatinine.  Additionally there was some levels of encephalopathy which are stable at this time. The patient was evaluated by the Rehabilitation team and deemed an appropriate candidate for comprehensive inpatient rehabilitation and admitted to the [facility] ([place] on 10/26/2024  5:13 PM     Rehab Diagnosis: Impairment of mobility, safety, Activities of Daily Living (ADLs), and cognitive/communication skills due to Stroke:  01.2  Right Body Involvement (Left Brain)  Etiologic Dx: Left Corona Radiata Infarct  Date of Onset: 10/14/2024   Date of surgery: N/A  Chief Complaint: f/u stroke    Interval: Patient seen and examined in room sitting in recliner eating breakfast. No  events overnight.  Reports overall feeling well. Family visited over the weekend. Did not remember what he did with therapies. Last BM 11/1. Consoled on taking his bowel medicine. Sleeping well at night. Denies any f/c/n/v, CP, SOB, abdominal pain,or diarrhea.       Objective   Functional Update:  Physical Therapy Occupational Therapy Speech Therapy   Weight Bearing Status: Full Weight Bearing  Transfers: Moderate Assistance  Bed Mobility: Moderate Assistance  Amulation Distance (ft): 10 feet  Ambulation: Moderate Assistance  Assistive Device for Ambulation: Alberto Walker  Wheelchair Mobility Distance: 150 ft  Wheelchair Mobility: Maximum Assistance  Assistive Device for Stairs: Lehft Hand Rail  Stair Assistance: Moderate Assistance  Discharge Recommendations:  (TBD pending support)   Eating: Supervision (KELLY, relying on compensatory tech)  Grooming: Supervision (KELLY, relying on compensatory tech)  Bathing: Moderate Assistance  Bathing: Moderate Assistance  Upper Body Dressing: Moderate Assistance  Lower Body Dressing: Moderate Assistance, Maximum Assistance  Toileting: Maximum Assistance  Toilet Transfer: Moderate Assistance  Cognition: Exceptions to WNL  Cognition: Decreased Memory, Decreased Executive Functions, Decreased Attention, Decreased Comprehension, Decreased Safety, Behavioral Considerations, Impulsive  Orientation: Person, Place   Mode of Communication: Verbal  Speech/Language: Dysarthia  Cognition: Exceptions to WNL  Cognition: Decreased Memory, Decreased Executive Functions, Decreased Attention, Decreased Comprehension  Orientation: Person, Time, Situation  Discharge Recommendations:  (pending pt progress)         Temp:  [97.4 °F (36.3 °C)-98.8 °F (37.1 °C)] 97.9 °F (36.6 °C)  HR:  [70-86] 70  Resp:  [18] 18  BP: (118-151)/(74-87) 139/87  SpO2:  [97 %-99 %] 98 %    Physical Exam    Gen: No acute distress, obese  HEENT: Moist mucus membranes, Normocephalic/Atraumatic  Cardiovascular: Regular rate,  rhythm,  Heme/Extr:mild bilateral edema   Pulmonary: Non-labored breathing. Lungs CTAB  : No amezquita  GI:  non-distended. BS+  MSK: ROM is WFL in all extremities.   Integumentary: Skin is warm, dry. .  Neuro:  dysarthric, right facial droop, R UE/LE weakness   Psych: Normal mood and affect.       Scheduled Meds:  Current Facility-Administered Medications   Medication Dose Route Frequency Provider Last Rate    acetaminophen  650 mg Oral Q6H PRN Alex S George, DO      aspirin  81 mg Oral Daily Alex S George, DO      atorvastatin  40 mg Oral QPM Alex S George, DO      buPROPion  100 mg Oral BID Alex S George, DO      calcium carbonate  1,000 mg Oral BID PRN Alex S George, DO      clopidogrel  75 mg Oral Daily Alex S George, DO      docusate sodium  100 mg Oral BID Cathy Miranda MD      fluticasone  1 spray Each Nare Daily SAMANTHA Kern      heparin (porcine)  7,500 Units Subcutaneous Q8H MIGEL Alex S George, DO      hydrALAZINE  25 mg Oral Q8H MIGEL Alex S George, DO      insulin lispro  1-5 Units Subcutaneous HS Alex S George, DO      insulin lispro  1-6 Units Subcutaneous TID AC Alex S George, DO      labetalol  300 mg Oral BID Alex S George, DO      lactulose  20 g Oral TID PRN Cathy Miranda MD      loratadine  5 mg Oral Daily SAMANTHA Kern      NIFEdipine  90 mg Oral Daily Alex S George, DO      OLANZapine  10 mg Oral HS Alex S George, DO      ondansetron  4 mg Oral Q6H PRN SAMANTHA Kern      pantoprazole  40 mg Oral Early Morning Alex S George, DO      polyethylene glycol  17 g Oral Daily Cathy Miranda MD      senna  2 tablet Oral HS Cathy Miranda MD      traZODone  50 mg Oral HS PRN Alex S George, DO      Valbenazine Tosylate  40 mg Oral HS Alex S George, DO           Lab Results: I have reviewed the following results:  Results from last 7 days   Lab Units 11/04/24  0504 10/30/24  0601   HEMOGLOBIN g/dL 9.1* 9.4*   HEMATOCRIT % 28.3* 27.5*   WBC Thousand/uL 6.41 7.66    PLATELETS Thousands/uL 167 192     Results from last 7 days   Lab Units 11/04/24  0504 11/01/24  0546 10/30/24  0601   BUN mg/dL 29* 27* 32*   SODIUM mmol/L 138 138 138   POTASSIUM mmol/L 4.8 4.7 4.6   CHLORIDE mmol/L 108 109* 109*   CREATININE mg/dL 3.66* 3.53* 3.63*              Cathy Miranda MD   Physical Medicine and Rehabilitation   11/04/24    I have spent a total time of 37 minutes in caring for this patient on the day of the visit/encounter including Patient and family education, Counseling / Coordination of care, Documenting in the medical record, and Communicating with other healthcare professionals .

## 2024-11-04 NOTE — PROGRESS NOTES
Progress Note - Internal Medicine   Name: Tommie Taylor 59 y.o. male I MRN: [id number]  Unit/Bed#: -01 I Date of Admission: 10/26/2024   Date of Service: 11/4/2024 I Hospital Day: 9    Assessment & Plan  CVA (cerebral vascular accident) (HCC)  Initial ED presentation 10/14/2024 with RUE and RLE extremity weakness and R facial droop. CTH and CTA were initially negative for acute process, LKW was 1 hour prior to presentation.   , cardene drip initiated to stabilized BP, TNK administration after stabilization of HTN. Symptoms worsened s/p TNK but f/u CT again demonstrated no acute process.   24 hr post-TNK CT negative.   MRI completed, demonstrated indicated left corona radiata stroke.   DAPT initiated for 3 weeks with plan for transition to asa monotherapy.   Continue ASA 81mg and Plavix 75mg daily until 11/6 then transition to ASA 81mg daily monotherapy.  Cardiology recommends Zio patch after discharge.  Follow up with Neurovascular after discharge.     PT/OT/ST per primary team.     Complaints of worsening RLE weakness today after therapies - obtain CTH.  Bipolar I disorder, severe, current or most recent episode depressed, with psychotic features (HCC)  Currently on bupropion 100mg BID and olanzapine 10mg at HS with valbenazine tosylate 40mg for tardive dyskinesia per home regimen. Follow up outpatient with psychiatry.   GERD (gastroesophageal reflux disease)  EGD with GI 10/14, directed to continue with Protonix. Tums available PRN for dyspepsia.   Insomnia  Educate and encourage on sleep hygiene. Continue Trazodone at HS for sleep.   DAISY (obstructive sleep apnea)  Continue use of CPAP with home settings.  Does not use CPAP at home - currently broken.  Recommend overnight oximetry prior to discharge.   Anemia, unspecified  Hgb currently 9.1.  Hemoglobin 12.1 on admission.   Most recent iron panel on 10/15: Iron Sat 38%, TIBC 204, Iron 77, and Ferritin 111.  No need for iron supplementation at this  time.  FOBT negative on 10/29. Repeat FOBT 11/2 negative.  Nephrology recommends venofer x3 doses.   Continue to trend routine CBC.  Hyperlipidemia  Most recent lipid panel 10/16/24: Cholesterol 154, triglycerides 120, HDL 40, LDL 90.  Continue Lipitor 40mg daily, increased from home 20mg daily.  Class 3 severe obesity due to excess calories with serious comorbidity and body mass index (BMI) of 40.0 to 44.9 in adult ([facility])  BMI 42.74 on admission. Encourage lifestyle modifications and provide support for nutritional teaching. Consult placed to nutrition services during acute course.   Tardive dyskinesia  Continue on home treatment Valbenazine Tosylate 40mg HS.   Hypertension  Presented with hypertensive emergency on initial hospitalization.   Concerns about medication non-compliance.  Home regimen: labetalol 100mg BID and amlodipine 10mg daily.  Target SBP 140s, currently on labetalol 300mg BID and Nifedipine 90mg daily with hydralazine 25mg Q8 hours.   Nephrology following.   DM2 (diabetes mellitus, type 2) ([facility])  Lab Results   Component Value Date    HGBA1C 8.6 (H) 10/16/2024       Recent Labs     11/03/24  1145 11/03/24  1604 11/03/24  2034 11/04/24  0557   POCGLU 137 158* 143* 120       Blood Sugar Average: Last 72 hrs:  (P) [id number]    Continue insulin management with sliding scale.  Pt had been using ozempic for home glucose management but ran out and did not have any other glycemic control agents.     Acute kidney injury superimposed on stage 4 chronic kidney disease ([facility])  Lab Results   Component Value Date    EGFR 17 11/04/2024    EGFR 17 11/01/2024    EGFR 17 10/30/2024    CREATININE 3.66 (H) 11/04/2024    CREATININE 3.53 (H) 11/01/2024    CREATININE 3.63 (H) 10/30/2024     Creatinine currently 3.66.  Baseline creatinine 2.5-2.9.    Avoid nephrotoxins.  Home diuretics currently on hold.  Encourage hydration.  Renal ultrasound demonstrates no hydro but mild bladder wall thickening.   Nephrology  following.  Labs MWF per Nephro.  Follows with Dr. No (Nephrology) as outpatient.  Encephalopathy  Lethargy and encephalopathic symptoms noted during acute course.  B12 and head CT WNL. No subsequent episodes noted.   EEG on 10/22 consistent with moderate nonspecific cerebral dysfunction, no seizure activity.  Continue to monitor behavior and symptoms for signs of changes.   Most recent MoCA was 18/30 in 2024.  Per Psych - if continues to have delirium consider discontinuing Wellbutrin.  Concerns DAISY could also be contributing to encephalopathy per acute care.  Vitamin D deficiency  Vitamin D level <7 on 10/7.  Continue home vitamin D 50,000u weekly.  Continue to follow-up with Nephrology as outpatient.  Impaired mobility and activities of daily living  Continue PT/OT/ST treatment.   Overgrown toenails  Podiatry consult.   VTE Pharmacologic Prophylaxis:   Pharmacologic: Heparin  Mechanical VTE Prophylaxis in Place: Yes    Current Length of Stay: 9 day(s)    Current Patient Status: Inpatient Rehab     Discharge Plan: As per treatment team     Code Status: Level 1 - Full Code    Subjective:   Pt received in wheelchair, sitting up after therapy session. Pt reports fatigue continues, sleeping alright at night but still very tired during day. Pt participating in therapies and feels he's improving. Pt continues to endorse constipation but reluctant to take bowel supplementation.     Objective:     Vitals:   Temp (24hrs), Av °F (36.7 °C), Min:97.4 °F (36.3 °C), Max:98.8 °F (37.1 °C)    Temp:  [97.4 °F (36.3 °C)-98.8 °F (37.1 °C)] 97.9 °F (36.6 °C)  HR:  [71-86] 73  Resp:  [18] 18  BP: (118-151)/(74-81) 139/81  SpO2:  [97 %-99 %] 98 %  Body mass index is 42.74 kg/m².     Review of Systems   Constitutional:  Positive for fatigue. Negative for chills and fever.   HENT:  Positive for drooling (R Facial Droop). Negative for congestion, ear pain, rhinorrhea and sore throat.    Eyes:  Negative for pain and visual  disturbance.   Respiratory:  Negative for apnea, cough, chest tightness and shortness of breath.    Cardiovascular:  Negative for chest pain and palpitations.   Gastrointestinal:  Positive for constipation. Negative for abdominal pain, diarrhea, nausea and vomiting.   Genitourinary:  Negative for dysuria and hematuria.   Musculoskeletal:  Negative for arthralgias, back pain and myalgias.   Skin:  Negative for color change and rash.   Allergic/Immunologic: Positive for environmental allergies.   Neurological:  Positive for facial asymmetry (R Facial Droop) and weakness (RUE, RLE). Negative for dizziness, seizures, syncope and light-headedness.   Psychiatric/Behavioral:  Negative for confusion.    All other systems reviewed and are negative.       Input and Output Summary (last 24 hours):       Intake/Output Summary (Last 24 hours) at 11/4/2024 0842  Last data filed at 11/4/2024 0206  Gross per 24 hour   Intake 780 ml   Output 500 ml   Net 280 ml       Physical Exam:     Physical Exam  Vitals and nursing note reviewed.   Constitutional:       Appearance: Normal appearance. He is obese. He is not ill-appearing or diaphoretic.   HENT:      Head: Normocephalic and atraumatic.      Nose: Nose normal.      Mouth/Throat:      Mouth: Mucous membranes are moist.   Cardiovascular:      Rate and Rhythm: Normal rate and regular rhythm.      Pulses: Normal pulses.      Heart sounds: Normal heart sounds.   Pulmonary:      Effort: Pulmonary effort is normal.      Breath sounds: Examination of the right-middle field reveals decreased breath sounds. Examination of the left-middle field reveals decreased breath sounds. Examination of the right-lower field reveals decreased breath sounds. Examination of the left-lower field reveals decreased breath sounds. Decreased breath sounds present. No wheezing or rhonchi.   Abdominal:      General: Abdomen is protuberant. Bowel sounds are decreased.      Palpations: Abdomen is soft.       Tenderness: There is no abdominal tenderness. There is no guarding or rebound.   Musculoskeletal:         General: No swelling.      Cervical back: Normal range of motion.      Right lower le+ Edema present.      Left lower le+ Edema present.   Skin:     General: Skin is warm and dry.   Neurological:      Mental Status: He is alert. Mental status is at baseline.   Psychiatric:         Mood and Affect: Mood normal.         Behavior: Behavior normal.         Additional Data:     Labs:    Results from last 7 days   Lab Units 24  0504   WBC Thousand/uL 6.41   HEMOGLOBIN g/dL 9.1*   HEMATOCRIT % 28.3*   PLATELETS Thousands/uL 167   SEGS PCT % 56   LYMPHO PCT % 28   MONO PCT % 11   EOS PCT % 3     Results from last 7 days   Lab Units 24  0504   SODIUM mmol/L 138   POTASSIUM mmol/L 4.8   CHLORIDE mmol/L 108   CO2 mmol/L 24   BUN mg/dL 29*   CREATININE mg/dL 3.66*   ANION GAP mmol/L 6   CALCIUM mg/dL 8.4   GLUCOSE RANDOM mg/dL 115         Results from last 7 days   Lab Units 24  0557 24  2034 24  1604 24  1145 24  0553 24  2014 24  1607 24  1044 24  0618 24  2040 24  1612 24  1117   POC GLUCOSE mg/dl 120 143* 158* 137 103 143* 104 120 95 127 150* 112               Labs reviewed    Imaging:    Imaging reviewed    Recent Cultures (last 7 days):           Last 24 Hours Medication List:   Current Facility-Administered Medications   Medication Dose Route Frequency Provider Last Rate    acetaminophen  650 mg Oral Q6H PRN Alex S George, DO      aspirin  81 mg Oral Daily Alex S George, DO      atorvastatin  40 mg Oral QPM Alex S George, DO      buPROPion  100 mg Oral BID Alex S George, DO      calcium carbonate  1,000 mg Oral BID PRN Alex S George, DO      clopidogrel  75 mg Oral Daily Alex S George, DO      docusate sodium  100 mg Oral BID Cathy Miranda MD      fluticasone  1 spray Each Nare Daily SAMANTHA Kern      heparin  (porcine)  7,500 Units Subcutaneous Q8H MIGEL Alex S George, DO      hydrALAZINE  25 mg Oral Q8H MIGEL Alex S George, DO      insulin lispro  1-5 Units Subcutaneous HS Alex S George, DO      insulin lispro  1-6 Units Subcutaneous TID AC Alex S George, DO      labetalol  300 mg Oral BID Alex S Ariel, DO      lactulose  20 g Oral TID PRN Cathy Miranda MD      loratadine  5 mg Oral Daily SAMANTHA Kern      NIFEdipine  90 mg Oral Daily Alex S George, DO      OLANZapine  10 mg Oral HS Alex S George, DO      ondansetron  4 mg Oral Q6H PRN SAMANTHA Kern      pantoprazole  40 mg Oral Early Morning Alex S Ariel, DO      polyethylene glycol  17 g Oral Daily Cathy Miranda MD      senna  2 tablet Oral HS Cathy Miranda MD      traZODone  50 mg Oral HS PRN Alex S George, DO      Valbenazine Tosylate  40 mg Oral HS Alex S George, DO          M*Modal software was used to dictate this note.  It may contain errors with dictating incorrect words or incorrect spelling. Please contact the provider directly with any questions.

## 2024-11-04 NOTE — PROGRESS NOTES
11/04/24 1445   Pain Assessment   Pain Assessment Tool 0-10   Pain Score No Pain   Restrictions/Precautions   Precautions Bed/chair alarms;Cognitive;Fall Risk;Supervision on toilet/commode   Sit to Stand   Type of Assistance Needed Incidental touching   Comment CG no deivce   Sit to Stand CARE Score 4   Bed-Chair Transfer   Type of Assistance Needed Physical assistance   Physical Assistance Level 25% or less   Comment Min A no device   Chair/Bed-to-Chair Transfer CARE Score 3   Walk 10 Feet   Type of Assistance Needed Physical assistance   Physical Assistance Level 25% or less   Comment Min A no device   Walk 10 Feet CARE Score 3   Therapeutic Interventions   Strengthening Supine- R leg off edge of mat to put hip flexors on stretch with quick lift for knee to chest flexion bias,  Bridges with left foot out to focus more on R leg,  RLE straight leg to knee to chest CARI OLSON 4# all 10x3,   Sitting edge of mat RUE ext stretch to break flexion tone,  then performed press ups from wedge to activate triceps,  performed high reps of this,  noted improved extension range after working on arm   Assessment   Treatment Assessment 45 min skilled PT session pt stated he is willing to perform and leg feels better but just a little tired.  Opted to perform mat program to work on R leg focus on glutes/ quads/ and hip flexors.  Also worked on RUE for tone and activate triceps, and used arm for press ups which would help with bed mobility.  Pt did short walk and leg looked stronger than earlier session as documented.  Cont skilled PT toward POC.   Problem List Decreased strength;Decreased range of motion;Decreased endurance;Impaired balance;Decreased mobility;Decreased coordination;Decreased cognition;Impaired judgement;Decreased safety awareness   Barriers to Discharge Inaccessible home environment;Decreased caregiver support   Plan   Progress Progressing toward goals   PT Therapy Minutes   PT Time In 1445   PT Time Out 3660    PT Total Time (minutes) 45   PT Mode of treatment - Individual (minutes) 45   PT Mode of treatment - Concurrent (minutes) 0   PT Mode of treatment - Group (minutes) 0   PT Mode of treatment - Co-treat (minutes) 0   PT Mode of Treatment - Total time(minutes) 45 minutes   PT Cumulative Minutes 625   Therapy Time missed   Time missed? No

## 2024-11-04 NOTE — ASSESSMENT & PLAN NOTE
Lab Results   Component Value Date    EGFR 17 11/04/2024    EGFR 17 11/01/2024    EGFR 17 10/30/2024    CREATININE 3.66 (H) 11/04/2024    CREATININE 3.53 (H) 11/01/2024    CREATININE 3.63 (H) 10/30/2024     Recent creatinine of 3.63 10/30 Prior baseline around 2.9-3.0  Etiology felt to be related potentially to ATN in the setting of uncontrolled hypertension and complicated by contrast associated nephropathy  Nephrology was consulted and followed and a UA showed microhematuria and proteinuria.  An ultrasound of the kidney/bladder showed wall thickening but no hydronephrosis  Bladder scans negative for retention  Monitor BMP triweekly or sooner if clinically indicated  Demadex has been on hold and had periodically required IV fluids  Nephrology following  Follow with nephrology as an outpatient or sooner on the [facility] if any acute changes

## 2024-11-04 NOTE — ASSESSMENT & PLAN NOTE
Lab Results   Component Value Date    EGFR 17 11/04/2024    EGFR 17 11/01/2024    EGFR 17 10/30/2024    CREATININE 3.66 (H) 11/04/2024    CREATININE 3.53 (H) 11/01/2024    CREATININE 3.63 (H) 10/30/2024     Creatinine currently 3.66.  Baseline creatinine 2.5-2.9.    Avoid nephrotoxins.  Home diuretics currently on hold.  Encourage hydration.  Renal ultrasound demonstrates no hydro but mild bladder wall thickening.   Nephrology following.  Labs MWF per Nephro.  Follows with Dr. No (Nephrology) as outpatient.

## 2024-11-04 NOTE — ASSESSMENT & PLAN NOTE
Initially with hypertensive emergency  Currently on labetalol 300 mg p.o. twice daily, nifedipine 90 mg p.o. daily, hydralazine 25 mg p.o. every 8  Continue to hold maintenance loop diuretic therapy.

## 2024-11-04 NOTE — TELEPHONE ENCOUNTER
Patient is calling regarding cancelling an appointment.    Date/Time: 11/18/2024 at 2 pm    Reason: pt in rehab due to stroke    Patient was rescheduled: YES [] NO [x]  If yes, when was Patient reschedule for: n/a    Patient requesting call back to reschedule: YES [] NO [x]

## 2024-11-04 NOTE — ASSESSMENT & PLAN NOTE
Lab Results   Component Value Date    HGBA1C 8.6 (H) 10/16/2024       Recent Labs     11/03/24  1145 11/03/24  1604 11/03/24 2034 11/04/24  0557   POCGLU 137 158* 143* 120       Blood Sugar Average: Last 72 hrs:  (P) [id number]    Continue insulin management with sliding scale.  Pt had been using ozempic for home glucose management but ran out and did not have any other glycemic control agents.

## 2024-11-04 NOTE — ASSESSMENT & PLAN NOTE
Initial ED presentation 10/14/2024 with RUE and RLE extremity weakness and R facial droop. CTH and CTA were initially negative for acute process, LKW was 1 hour prior to presentation.   , cardene drip initiated to stabilized BP, TNK administration after stabilization of HTN. Symptoms worsened s/p TNK but f/u CT again demonstrated no acute process.   24 hr post-TNK CT negative.   MRI completed, demonstrated indicated left corona radiata stroke.   DAPT initiated for 3 weeks with plan for transition to asa monotherapy.   Continue ASA 81mg and Plavix 75mg daily until 11/6 then transition to ASA 81mg daily monotherapy.  Cardiology recommends Zio patch after discharge.  Follow up with Neurovascular after discharge.     PT/OT/ST per primary team.     Complaints of worsening RLE weakness today after therapies - obtain CTH.

## 2024-11-04 NOTE — ASSESSMENT & PLAN NOTE
Recent hemoglobin slightly worsened 9.1.  Stool for occult blood negative  SPEP negative for monoclonal gammopathy  Iron saturation 38% with a ferritin of 111.  Could consider empiric Venofer.  KOBI relatively contraindicated given recent CVA

## 2024-11-04 NOTE — PROGRESS NOTES
11/04/24 1000   Pain Assessment   Pain Assessment Tool 0-10   Pain Score No Pain   Comprehension   Comprehension (FIM) 4 - Understands basic info/conversation 75-90% of time   Expression   Expression (FIM) 4 - Expresses basic info/needs 75-90% of time   Social Interaction   Social Interaction (FIM) 4 - Needs redirecting for appropriate language or to initiate interaction   Problem Solving   Problem solving (FIM) 3 - Solves basic problmes 50-74% of time   Memory   Memory (FIM) 3 - Recognizes, recalls/performs 50-74%   Speech/Language/Cognition Assessment   Treatment Assessment Patient in wheelchair in room at onset of ST session, agreeable to going into OT gym for ST session. Patient initiating by unlocking one side of wheelchair, and requesting assistance to unlock other side, showing insight into current physical limitations. When in the OT gym, pt first asked to share his clear speech strategies which have been discussed with him several times in previous ST sessions. He was able to recall 2/4, increasing to 4/4 with verbal cues. SLP then inquired if pt has been completing his oral motor exercises, to which he reports he has not been. SLP inquired why, to which he said he didn't feel like it. SLP discussed reasons for completing 2x day, explaining scenarios post discharge that may pose an issue if he continues with his current speech intelligibility levels. Pt nodding but suspect would benefit from additional encouragement to complete consistently. Pt then provided with mirror for biofeedback and instructed to complete a couple exercises each for each articulator/oral musculature, x10 reps. As he completed, pt showing improvement since last time SLP completed OM exercises with him, suspect given the biofeedback. SLP encouraged to continue using forward facing mirror while practicing/exercising during down time. Patient in agreement. Patient then given cognitive task of category matrixes. He was given a  "category, then several letters to which he had to provide a word that fit both requirements. He completed 16/20 i'ly, increasing to 20/20 when given min semantic cues. During this time, patient's alertness levels varied, closing eyes several times and requiring many verbal cues to stay awake and open eyes. Going back to oral motor/speech therapy, patient was then provided with visual and verbal model for single syllable bilabial words and non-words. He was instructed to repeat, then complete the same words by reading independently. He demonstrated 90% intelligibility when repeating SLP, and when reading independently, he dropped to about 60%, showing reduced loudness and articulation, requiring several verbal cues to be louder and over-articulate. He then completed the same with labio-dentals, and performed similarly when repeating SLP, but improved to 70% when reading aloud independently. Patient then again transitioning to cognitive task where he was provided with a item and then asked what \"else\" he could do with the item besides it's typical use/purpose, demonstrating reasoning abilities. He was able to think creatively on 10/15, increasing to 12/15 when given verbal cues and rewording. Patient finally asked again to recall clear speech strategies, to which he again only recalled 2/4, the other two he did not recall at beginning of session, and increasing to 4/4 with visual/gesture cues. Patient is continuing to show benefit to skilled SLP services while at the [facility] and would further benefit from additional services necessary for reducing burden of care at time of discharge and for optimizing cognitive-linguistic skills.   SLP Therapy Minutes   SLP Time In 1000   SLP Time Out 1100   SLP Total Time (minutes) 60   SLP Mode of treatment - Individual (minutes) 60   SLP Mode of treatment - Concurrent (minutes) 0   SLP Mode of treatment - Group (minutes) 0   SLP Mode of treatment - Co-treat (minutes) 0   SLP Mode of " Treatment - Total time(minutes) 60 minutes   SLP Cumulative Minutes 245   Therapy Time missed   Time missed? No

## 2024-11-04 NOTE — PROGRESS NOTES
NEPHROLOGY HOSPITAL PROGRESS NOTE   Tommie Taylor 59 y.o. male MRN: [id number]  Unit/Bed#: -01 Encounter: [id number]  Reason for Consult: ROSINA / CKD    Assessment & Plan  Acute kidney injury superimposed on stage 4 chronic kidney disease (HCC)    ROSINA thought to be secondary to IAN 10/14/2024 + ischemic injury secondary to severe hemodynamic perturbations plus some component of prerenal azotemia with subsequent ATN.  Creatinine appears to have plateaued at approximately 3.5-3.6.  Current EGFR of 17.  Likely represents new baseline given previous advanced CKD with previous baseline creatinine near 3.0.    Noted nephrotic range proteinuria underlying disease most likely secondary to diabetic kidney disease.  CVA (cerebral vascular accident) ([facility])    Status post TNK administration  Management as per primary service and neurology.  Bipolar I disorder, severe, current or most recent episode depressed, with psychotic features ([facility])  Follow-up with psych  Currently on Zyprexa and Wellbutrin  Anemia, unspecified  Recent hemoglobin slightly worsened 9.1.  Stool for occult blood negative  SPEP negative for monoclonal gammopathy  Iron saturation 38% with a ferritin of 111.  Could consider empiric Venofer.  KOBI relatively contraindicated given recent CVA  Hypertension  Initially with hypertensive emergency  Currently on labetalol 300 mg p.o. twice daily, nifedipine 90 mg p.o. daily, hydralazine 25 mg p.o. every 8  Continue to hold maintenance loop diuretic therapy.    Summary:  Overall renal function remains fairly stable with a creatinine of 3.66, likely represents new baseline given previous advanced CKD and nephrotic range proteinuria  Hemoglobin 9.1.  Could consider starting Venofer 300 mg IV x 3 doses  Unfortunately KOBI therapy relatively contraindicated given recent CVA  Blood pressure acceptable  Continue to hold maintenance loop diuretic therapy, use as needed.    SUBJECTIVE / 24H INTERVAL HISTORY:  Seen and  examined.  Patient awake and alert.  Complains of mild fatigue.  Denies any chest pain shortness of breath.  No abdominal pain.  Denies any lower extremity swelling.  His appetite is stable.    OBJECTIVE:  Current Weight: Weight - Scale: 120 kg (264 lb 12.8 oz)  Vitals:    11/03/24 1505 11/03/24 1932 11/04/24 0458 11/04/24 0847   BP: 151/74 118/74 139/81 139/87   BP Location: Left arm Left arm Left arm Left arm   Pulse: 71 86 73 70   Resp: 18 18 18    Temp: (!) 97.4 °F (36.3 °C) 98.8 °F (37.1 °C) 97.9 °F (36.6 °C)    TempSrc: Tympanic Tympanic Tympanic    SpO2: 97% 99% 98%    Weight:       Height:           Intake/Output Summary (Last 24 hours) at 11/4/2024 1323  Last data filed at 11/4/2024 0900  Gross per 24 hour   Intake 180 ml   Output 500 ml   Net -320 ml       Physical Exam  Constitutional:       Appearance: He is not ill-appearing.   Eyes:      General: No scleral icterus.  Cardiovascular:      Rate and Rhythm: Normal rate and regular rhythm.   Pulmonary:      Effort: Pulmonary effort is normal.      Breath sounds: Normal breath sounds.   Abdominal:      General: There is no distension.      Palpations: Abdomen is soft.      Tenderness: There is no abdominal tenderness.   Musculoskeletal:      Right lower leg: No edema.      Left lower leg: No edema.   Skin:     General: Skin is warm and dry.      Findings: No rash.   Neurological:      Mental Status: He is alert. Mental status is at baseline.         Medications:    Current Facility-Administered Medications:     acetaminophen (TYLENOL) tablet 650 mg, 650 mg, Oral, Q6H PRN, Alex George, DO, 650 mg at 10/26/24 1842    aspirin (ECOTRIN LOW STRENGTH) EC tablet 81 mg, 81 mg, Oral, Daily, Alex George DO, 81 mg at 11/04/24 0846    atorvastatin (LIPITOR) tablet 40 mg, 40 mg, Oral, QPM, Alex George DO, 40 mg at 11/03/24 1719    buPROPion (WELLBUTRIN) tablet 100 mg, 100 mg, Oral, BID, Alex George DO, 100 mg at 11/04/24 0846    calcium carbonate (TUMS)  chewable tablet 1,000 mg, 1,000 mg, Oral, BID PRN, Alex George DO    clopidogrel (PLAVIX) tablet 75 mg, 75 mg, Oral, Daily, Alex George DO, 75 mg at 11/04/24 0846    docusate sodium (COLACE) capsule 100 mg, 100 mg, Oral, BID, Cathy Miranda MD, 100 mg at 11/04/24 0846    fluticasone (FLONASE) 50 mcg/act nasal spray 1 spray, 1 spray, Each Nare, Daily, SAMANTHA Kern, 1 spray at 11/04/24 0847    heparin (porcine) subcutaneous injection 7,500 Units, 7,500 Units, Subcutaneous, Q8H MIGEL, Alex George DO, 7,500 Units at 11/04/24 0502    hydrALAZINE (APRESOLINE) tablet 25 mg, 25 mg, Oral, Q8H MIGEL, Alex George DO, 25 mg at 11/04/24 0503    insulin lispro (HumALOG/ADMELOG) 100 units/mL subcutaneous injection 1-5 Units, 1-5 Units, Subcutaneous, HS, Alex George DO    insulin lispro (HumALOG/ADMELOG) 100 units/mL subcutaneous injection 1-6 Units, 1-6 Units, Subcutaneous, TID AC, 1 Units at 11/04/24 1143 **AND** Fingerstick Glucose (POCT), , , 4x Daily AC and at bedtime, Alex George DO    labetalol (NORMODYNE) tablet 300 mg, 300 mg, Oral, BID, Alex Georeg DO, 300 mg at 11/04/24 0847    lactulose (CHRONULAC) oral solution 20 g, 20 g, Oral, TID PRN, Cathy Miranda MD, 20 g at 11/04/24 1146    loratadine (CLARITIN) tablet 5 mg, 5 mg, Oral, Daily, SAMANTHA Kern, 5 mg at 11/04/24 0846    NIFEdipine (PROCARDIA XL) 24 hr tablet 90 mg, 90 mg, Oral, Daily, Alex George DO, 90 mg at 11/04/24 0846    OLANZapine (ZyPREXA) tablet 10 mg, 10 mg, Oral, HS, Alex George DO, 10 mg at 11/03/24 2122    ondansetron (ZOFRAN-ODT) dispersible tablet 4 mg, 4 mg, Oral, Q6H PRN, SAMANTHA Kern    pantoprazole (PROTONIX) EC tablet 40 mg, 40 mg, Oral, Early Morning, Alex George DO, 40 mg at 11/04/24 0502    polyethylene glycol (MIRALAX) packet 17 g, 17 g, Oral, Daily, Cathy Miranda MD, 17 g at 11/01/24 0951    senna (SENOKOT) tablet 17.2 mg, 2 tablet, Oral, HS, Cathy Miranda MD, 17.2 mg at  "11/03/24 2122    traZODone (DESYREL) tablet 50 mg, 50 mg, Oral, HS PRN, Alex George, DO, 50 mg at 11/04/24 0019    Valbenazine Tosylate CAPS 40 mg, 40 mg, Oral, HS, Alex George, DO, 40 mg at 11/03/24 2124    Laboratory Results:  Results from last 7 days   Lab Units 11/04/24  0504 11/01/24  0546 10/30/24  0601   WBC Thousand/uL 6.41  --  7.66   HEMOGLOBIN g/dL 9.1*  --  9.4*   HEMATOCRIT % 28.3*  --  27.5*   PLATELETS Thousands/uL 167  --  192   POTASSIUM mmol/L 4.8 4.7 4.6   CHLORIDE mmol/L 108 109* 109*   CO2 mmol/L 24 24 23   BUN mg/dL 29* 27* 32*   CREATININE mg/dL 3.66* 3.53* 3.63*   CALCIUM mg/dL 8.4 8.6 8.6   PHOSPHORUS mg/dL 4.0 4.3 3.8       Portions of the record may have been created with voice recognition software. Occasional wrong word or \"sound a like\" substitutions may have occurred due to the inherent limitations of voice recognition software. Read the chart carefully and recognize, using context, where substitutions have occurred.If you have any questions, please contact the dictating provider.  "

## 2024-11-04 NOTE — ASSESSMENT & PLAN NOTE
ROSINA thought to be secondary to IAN 10/14/2024 + ischemic injury secondary to severe hemodynamic perturbations plus some component of prerenal azotemia with subsequent ATN.  Creatinine appears to have plateaued at approximately 3.5-3.6.  Current EGFR of 17.  Likely represents new baseline given previous advanced CKD with previous baseline creatinine near 3.0.    Noted nephrotic range proteinuria underlying disease most likely secondary to diabetic kidney disease.

## 2024-11-04 NOTE — PROGRESS NOTES
11/04/24 1230   Pain Assessment   Pain Assessment Tool 0-10   Pain Score No Pain   Restrictions/Precautions   Precautions Bed/chair alarms;Cognitive;Fall Risk;Supervision on toilet/commode   Sit to Lying   Type of Assistance Needed Supervision   Sit to Lying CARE Score 4   Lying to Sitting on Side of Bed   Type of Assistance Needed Physical assistance   Physical Assistance Level 25% or less   Comment getting up from right side   Lying to Sitting on Side of Bed CARE Score 3   Sit to Stand   Type of Assistance Needed Incidental touching   Comment CG no device   Sit to Stand CARE Score 4   Bed-Chair Transfer   Type of Assistance Needed Physical assistance   Physical Assistance Level 25% or less   Comment Min A no device   Chair/Bed-to-Chair Transfer CARE Score 3   Walk 10 Feet   Type of Assistance Needed Physical assistance   Physical Assistance Level 26%-50%   Comment Min/ Mod A no device   Walk 10 Feet CARE Score 3   Walk 50 Feet with Two Turns   Type of Assistance Needed Physical assistance   Physical Assistance Level 26%-50%   Comment Mod A no device   Walk 50 Feet with Two Turns CARE Score 3   Walk 150 Feet   Type of Assistance Needed Physical assistance   Physical Assistance Level 26%-50%   Comment Mod A no device   Walk 150 Feet CARE Score 3   Ambulation   Primary Mode of Locomotion Prior to Admission Walk   Distance Walked (feet) 150 ft  (x2  75x2)   Assist Device   (none)   Gait Pattern Slow Lesley;Decreased foot clearance;R foot drag;R hemiparesis;Trendelenburg;Improper weight shift   Provided Assistance with: Balance   Walk Assist Level Moderate Assist   Findings Started session CS / CG level but as pt fatigued t/o session got to Mod A , and 1 time needed to stop and grab rail for support due to instability,  he was able to amb and make it back to chair   Does the patient walk? 2. Yes   4 Steps   Type of Assistance Needed Physical assistance   Physical Assistance Level 26%-50%   Comment Mod A with single  rail on L-  trialed comming down sideways (poor footing and balance) , trialed comming down retro  pt more stable more Min A ,  R foot catch with heel on descent   4 Steps CARE Score 3   12 Steps   Type of Assistance Needed Physical assistance   Physical Assistance Level 26%-50%   Comment Mod A due to balance and R foot catch-   12 Steps CARE Score 3   Stairs   Type Stairs   # of Steps 12   Assist Devices Single Rail   Findings 1st set of 4 performed up with L HR and down with L HR tried side step approach but pt couldnt get foot spacing right and was getting unstable,  then trialed 4 steps with retro down which pt more min A and more stable,  then performed 4 steps reciprocal pattern up and down,   Pt only has L rail at home so will need to perform for NPP but also home set up   Therapeutic Interventions   Neuromuscular Re-Education unsupported reciprocal stepping over canes on floor (needed Min / mod A  with 2nd on stand by)  R heel catch a lot could correct with VCs , near scissor step when fatigued,  Std ball kick ( pt very fatigued with R leg and needed to abruptly sit),  Amb 125' kicking balloon down park (mod A)   Equipment Use   NuStep 10 min for neuro prime L 3 with SPM 60  used hand assist on R   Pt reports 5/10 GABRIELLE   Assessment   Treatment Assessment 75 min skilled PT session focused on unsupported gait , pt continues to fatigue fairly quickly and inc R leg scuff with distance.  Pt starts off at CG level but increases to Min/ Mod A.  This session noted above during ball kick pt stated R leg got so weak it was going to give out and he needed to abruptly sit,  BP was 140/80, denied inc numbness , denied headache or lightheaded.  Notified NSG and CRNP and she ordered new cat scan.  Overall pt did more this session with inc level on Nustep, inc number of steps and dual tasking balance but this was of concern so cut session short and treated again later in day.  Cont PT as able , MD stated he can still  perform therapy later.   Problem List Decreased strength;Decreased range of motion;Decreased endurance;Impaired balance;Decreased mobility;Decreased coordination;Decreased cognition;Impaired judgement;Decreased safety awareness   Barriers to Discharge Inaccessible home environment;Decreased caregiver support   Plan   Progress Progressing toward goals   PT Therapy Minutes   PT Time In 1230   PT Time Out 1345   PT Total Time (minutes) 75   PT Mode of treatment - Individual (minutes) 75   PT Mode of treatment - Concurrent (minutes) 0   PT Mode of treatment - Group (minutes) 0   PT Mode of treatment - Co-treat (minutes) 0   PT Mode of Treatment - Total time(minutes) 75 minutes   PT Cumulative Minutes 580   Therapy Time missed   Time missed? No

## 2024-11-04 NOTE — PLAN OF CARE
Problem: NEUROSENSORY - ADULT  Goal: Achieves stable or improved neurological status  Description: INTERVENTIONS  - Monitor and report changes in neurological status  - Monitor vital signs such as temperature, blood pressure, glucose, and any other labs ordered   - Monitor for seizure activity and implement precautions if appropriate      Outcome: Progressing

## 2024-11-04 NOTE — PROGRESS NOTES
"   11/04/24 1539   Pain Assessment   Pain Assessment Tool 0-10   Pain Score No Pain   Patient's Stated Pain Goal No pain   Restrictions/Precautions   Precautions Bed/chair alarms;Cognitive;Fall Risk;Supervision on toilet/commode   Weight Bearing Restrictions No   ROM Restrictions No   Lifestyle   Autonomy Pt excited to be able to take a shower.   Reciprocal Relationships family   Service to Others Retired working in [employer]   Intrinsic Gratification TV   Oral Hygiene   Type of Assistance Needed Set-up / clean-up   Physical Assistance Level No physical assistance   Comment standing at the sink. Able to open toothpaste using his teeth, retraining on use of RUE on the brush to stabilize it during paste placement, unable to open the mouthwash without assist.   Oral Hygiene CARE Score 5   Shower/Bathe Self   Type of Assistance Needed Physical assistance   Physical Assistance Level 26%-50%   Comment showering seated on shower seat with HHSH, grab bar. Assistance required for right axilla, upper left UE, and buttocks in stance. Pt able to bathe face, chest, abdomen, periareas and Legs through functional reach with the left hand. Pt reports his wife Marie will wash his back and buttocks for him at discharge. Pt with increased right UE movement today to reach left chest and almost into the axilla.   Shower/Bathe Self CARE Score 3   Tub/Shower Transfer   Limitations Noted In Balance;UE Strength;LE Strength   Assessed Shower   Findings CG/Min A ambulation into BR and onto shower seat, Pt has 3\"lip to get into shower. and vertical GB, then horizontal GBaround the other walls.   Upper Body Dressing   Type of Assistance Needed Physical assistance   Physical Assistance Level 25% or less   Comment assist to set-up the shirt for right hand placement. Pt able to recall the techniques but struggled to scrunch the shirt to the arm sleeve and then place RUE into the shirt. He was attempting to thread the sleeve to soon and then losing " the right hand within the shirt. Able to doff sweatshirt with supervision.   Upper Body Dressing CARE Score 3   Lower Body Dressing   Type of Assistance Needed Physical assistance   Physical Assistance Level 25% or less   Comment Pt able to thread pants seated EOB, Min A to mntn balance in stance during hike as well as last pull up to waist on the right side.   Lower Body Dressing CARE Score 3   Putting On/Taking Off Footwear   Type of Assistance Needed Physical assistance   Physical Assistance Level 26%-50%   Comment attempted various sitting positions to improve functional reach to toes with success on left foot to don sock himself with CGA having the leg propped up onto side of bed, however unable to assume this posiiton with the right LE. Once started to midfoot, Pt able to don the rest of the way. Able to don left sneaker, mod A right sneaker and dependent to tie laces.   Putting On/Taking Off Footwear CARE Score 3   Sit to Stand   Type of Assistance Needed Incidental touching   Physical Assistance Level No physical assistance   Comment CGA from various surfaces throughout ADL   Sit to Stand CARE Score 4   Bed-Chair Transfer   Type of Assistance Needed Physical assistance   Physical Assistance Level 25% or less   Comment CG/Min A SPT and ambulation to/from BR with gait belt no AD   Chair/Bed-to-Chair Transfer CARE Score 3   Functional Standing Tolerance   Time 6min   Activity grooming at the sink   Cognition   Overall Cognitive Status Impaired   Arousal/Participation Alert;Cooperative   Attention Attends with cues to redirect   Orientation Level Oriented X4   Memory Decreased short term memory;Decreased recall of precautions;Decreased recall of recent events   Following Commands Follows one step commands with increased time or repetition   Activity Tolerance   Activity Tolerance Patient tolerated treatment well   Assessment   Treatment Assessment (S)  Pt engaged in 60 min OT session focused on selfcare  performance with initiation of shower for bathing rather than sponge bath. See above for details of performance. Overall, Pt is progressing with use of estefania techniques for bathing and dressing, increased standing tolerance and static stand balance during selfcare tasks, improved RUE resting position with decreased flexor tone. Pt continues to benefit from skilled OT intervention to maximize safety and independence with daily selfcare performance and NMR for the RUE. Next session, complete visual assessment task, introduce sock aide and elastic laces (Pt states his family will assist, however attempting to reduce caregiver bruden based on family's questionable ability to assist), practice toileting to determine need for PF BSC at discharge.   Prognosis Good   Problem List Decreased strength;Decreased range of motion;Decreased endurance;Impaired balance;Decreased mobility;Decreased coordination;Decreased cognition;Impaired judgement;Decreased safety awareness   Barriers to Discharge Inaccessible home environment;Decreased caregiver support   Barriers to Discharge Comments questionable family support at discharge.   Plan   Treatment/Interventions ADL retraining;Functional transfer training;Therapeutic exercise;Endurance training;Patient/family training;Equipment eval/education;Compensatory technique education   Progress Progressing toward goals   OT Therapy Minutes   OT Time In 0830   OT Time Out 0930   OT Total Time (minutes) 60   OT Mode of treatment - Individual (minutes) 60   OT Mode of treatment - Concurrent (minutes) 0   OT Mode of treatment - Group (minutes) 0   OT Mode of treatment - Co-treat (minutes) 0   OT Mode of Treatment - Total time(minutes) 60 minutes   OT Cumulative Minutes 585   Therapy Time missed   Time missed? No

## 2024-11-04 NOTE — ASSESSMENT & PLAN NOTE
Hgb currently 9.1.  Hemoglobin 12.1 on admission.   Most recent iron panel on 10/15: Iron Sat 38%, TIBC 204, Iron 77, and Ferritin 111.  No need for iron supplementation at this time.  FOBT negative on 10/29. Repeat FOBT 11/2 negative.  Nephrology recommends venofer x3 doses.   Continue to trend routine CBC.

## 2024-11-04 NOTE — ASSESSMENT & PLAN NOTE
Lab Results   Component Value Date    HGBA1C 8.6 (H) 10/16/2024       Recent Labs     11/03/24  1145 11/03/24  1604 11/03/24 2034 11/04/24  0557   POCGLU 137 158* 143* 120     Most recent hemoglobin A1c of 8.6 and technically uncontrolled although blood sugars in the hospital have been well-controlled  Currently on semaglutide as an outpatient but was on hold due to ROSINA in the hospital  Continue sliding scale and 4 times daily Accu-Cheks and as well as a diabetic diet

## 2024-11-05 PROBLEM — I95.1 ORTHOSTATIC HYPOTENSION: Status: ACTIVE | Noted: 2024-11-05

## 2024-11-05 LAB
GLUCOSE SERPL-MCNC: 100 MG/DL (ref 65–140)
GLUCOSE SERPL-MCNC: 111 MG/DL (ref 65–140)
GLUCOSE SERPL-MCNC: 119 MG/DL (ref 65–140)
GLUCOSE SERPL-MCNC: 88 MG/DL (ref 65–140)

## 2024-11-05 PROCEDURE — 99232 SBSQ HOSP IP/OBS MODERATE 35: CPT | Performed by: INTERNAL MEDICINE

## 2024-11-05 PROCEDURE — 94660 CPAP INITIATION&MGMT: CPT

## 2024-11-05 PROCEDURE — 94003 VENT MGMT INPAT SUBQ DAY: CPT

## 2024-11-05 PROCEDURE — 82948 REAGENT STRIP/BLOOD GLUCOSE: CPT

## 2024-11-05 PROCEDURE — 97542 WHEELCHAIR MNGMENT TRAINING: CPT

## 2024-11-05 PROCEDURE — 97112 NEUROMUSCULAR REEDUCATION: CPT

## 2024-11-05 PROCEDURE — 97116 GAIT TRAINING THERAPY: CPT

## 2024-11-05 PROCEDURE — 97530 THERAPEUTIC ACTIVITIES: CPT

## 2024-11-05 PROCEDURE — 97110 THERAPEUTIC EXERCISES: CPT

## 2024-11-05 PROCEDURE — 97130 THER IVNTJ EA ADDL 15 MIN: CPT

## 2024-11-05 PROCEDURE — 97129 THER IVNTJ 1ST 15 MIN: CPT

## 2024-11-05 RX ORDER — LACTULOSE 10 G/15ML
20 SOLUTION ORAL 3 TIMES DAILY
Status: DISCONTINUED | OUTPATIENT
Start: 2024-11-05 | End: 2024-11-06

## 2024-11-05 RX ORDER — HYDRALAZINE HYDROCHLORIDE 10 MG/1
10 TABLET, FILM COATED ORAL EVERY 8 HOURS SCHEDULED
Status: DISCONTINUED | OUTPATIENT
Start: 2024-11-05 | End: 2024-11-18 | Stop reason: HOSPADM

## 2024-11-05 RX ADMIN — NIFEDIPINE 90 MG: 30 TABLET, FILM COATED, EXTENDED RELEASE ORAL at 08:25

## 2024-11-05 RX ADMIN — HEPARIN SODIUM 7500 UNITS: 5000 INJECTION INTRAVENOUS; SUBCUTANEOUS at 05:26

## 2024-11-05 RX ADMIN — IRON SUCROSE 200 MG: 20 INJECTION, SOLUTION INTRAVENOUS at 15:09

## 2024-11-05 RX ADMIN — PANTOPRAZOLE SODIUM 40 MG: 40 TABLET, DELAYED RELEASE ORAL at 05:26

## 2024-11-05 RX ADMIN — HEPARIN SODIUM 7500 UNITS: 5000 INJECTION INTRAVENOUS; SUBCUTANEOUS at 21:08

## 2024-11-05 RX ADMIN — DOCUSATE SODIUM 100 MG: 100 CAPSULE, LIQUID FILLED ORAL at 08:24

## 2024-11-05 RX ADMIN — VALBENAZINE 40 MG: 40 CAPSULE ORAL at 21:10

## 2024-11-05 RX ADMIN — ATORVASTATIN CALCIUM 40 MG: 40 TABLET, FILM COATED ORAL at 17:13

## 2024-11-05 RX ADMIN — LACTULOSE 20 G: 20 SOLUTION ORAL at 17:13

## 2024-11-05 RX ADMIN — LABETALOL HYDROCHLORIDE 300 MG: 100 TABLET, FILM COATED ORAL at 20:48

## 2024-11-05 RX ADMIN — ASPIRIN 81 MG: 81 TABLET, COATED ORAL at 08:25

## 2024-11-05 RX ADMIN — SENNOSIDES 17.2 MG: 8.6 TABLET, FILM COATED ORAL at 21:10

## 2024-11-05 RX ADMIN — DOCUSATE SODIUM 100 MG: 100 CAPSULE, LIQUID FILLED ORAL at 17:13

## 2024-11-05 RX ADMIN — OLANZAPINE 10 MG: 2.5 TABLET, FILM COATED ORAL at 21:09

## 2024-11-05 RX ADMIN — FLUTICASONE PROPIONATE 1 SPRAY: 50 SPRAY, METERED NASAL at 08:25

## 2024-11-05 RX ADMIN — LABETALOL HYDROCHLORIDE 300 MG: 100 TABLET, FILM COATED ORAL at 08:24

## 2024-11-05 RX ADMIN — CLOPIDOGREL BISULFATE 75 MG: 75 TABLET ORAL at 08:25

## 2024-11-05 RX ADMIN — HYDRALAZINE HYDROCHLORIDE 10 MG: 10 TABLET ORAL at 15:40

## 2024-11-05 RX ADMIN — BUPROPION HYDROCHLORIDE 100 MG: 100 TABLET, FILM COATED ORAL at 08:24

## 2024-11-05 RX ADMIN — LORATADINE 5 MG: 10 TABLET ORAL at 08:24

## 2024-11-05 RX ADMIN — BUPROPION HYDROCHLORIDE 100 MG: 100 TABLET, FILM COATED ORAL at 17:12

## 2024-11-05 RX ADMIN — HEPARIN SODIUM 7500 UNITS: 5000 INJECTION INTRAVENOUS; SUBCUTANEOUS at 14:33

## 2024-11-05 RX ADMIN — HYDRALAZINE HYDROCHLORIDE 25 MG: 25 TABLET ORAL at 05:26

## 2024-11-05 RX ADMIN — HYDRALAZINE HYDROCHLORIDE 10 MG: 10 TABLET ORAL at 21:09

## 2024-11-05 RX ADMIN — LACTULOSE 20 G: 20 SOLUTION ORAL at 20:48

## 2024-11-05 NOTE — PROGRESS NOTES
Progress Note - PMR   Name: Tommie Taylor 59 y.o. male I MRN: [id number]  Unit/Bed#: [facility] [place] 262-01 I Date of Admission: 10/26/2024   Date of Service: 11/5/2024 I Hospital Day: 10     Assessment & Plan  CVA (cerebral vascular accident) ([facility])  Patient with uncontrolled hypertension and diabetes presents with right upper and right lower extremity weakness as well as facial droop  A CT of the head as well as a CTA of the head and neck were completed with negative for acute abnormalities for an acute process  TNK had been administered following the correction of his hypertension after being placed on a Cardene drip  After ministration of the TNK developed worsening dysarthria as well as headache and a repeat CT was still negative.  The 24 post TNK CT was also negative  Neurology followed the patient and MRI was completed and was significant for left corona radiata stroke  Placed on dual antiplatelet therapy for 3 weeks with plans for monotherapy with aspirin and statin indefinitely for secondary stroke prophylaxis  Recommendation for a Zio patch as an outpatient  Follow-up with neurovascular attending in 6 to 8 weeks  Physical, occupational and speech therapy while on the acute rehabilitation unit  Episode of worsening RLE weakness, CTH 11/4 no acute changes.   Bipolar I disorder, severe, current or most recent episode depressed, with psychotic features ([facility])  History of chronic bipolar 1 disorder and follows with outpatient psychiatry and also was seen inpatient  Mood has been stable and is maintained on Zyprexa, bupropion and trazodone as well as Ingrezza for tardive dyskinesia  Follow-up with psychiatry as an outpatient and consider virtual consultation if indicated for any changes while on ARC  GERD (gastroesophageal reflux disease)  Continue Protonix as well as as needed Tums  Insomnia  Continue trazodone and consider sleep logs  DAISY (obstructive sleep apnea)  Continue CPAP with home settings  Ordered and compliant  per records  Anemia, unspecified  Hemoglobin most recently of 10.4 which is up from 9.3 but has been hovering in the 10-11 range for most of admission  Continue to monitor with biweekly CBC or sooner if clinically indicated  Hyperlipidemia  Continue Lipitor 40 mg every evening  Class 3 severe obesity due to excess calories with serious comorbidity and body mass index (BMI) of 40.0 to 44.9 in adult ([facility])  Continue with exercise and promote lifestyle modification, weight loss counseling and management of medical conditions to optimize status  Tardive dyskinesia  Continue Ingrezza 40 mg at bedtime  Hypertension  Presented to the hospital significantly elevated blood pressure with systolic ranging from 197-231  Was placed on a Cardizem drip initially for hypertensive emergency  Had been on Demadex 20 mg daily but was on hold due to the increasing creatinine  Cw labetalol 300 mg BID , nifedipine 90 mg daily and hydralazine 25mg Q8h  Goals for normotension  Mgmt per IM  Follow-up with nephrology as an outpatient  DM2 (diabetes mellitus, type 2) ([facility])  Lab Results   Component Value Date    HGBA1C 8.6 (H) 10/16/2024       Recent Labs     11/04/24  1615 11/04/24  2100 11/05/24  0552 11/05/24  1113   POCGLU 104 106 88 111     Most recent hemoglobin A1c of 8.6 and technically uncontrolled although blood sugars in the hospital have been well-controlled  Currently on semaglutide as an outpatient but was on hold due to ROSINA in the hospital  Continue sliding scale and 4 times daily Accu-Cheks and as well as a diabetic diet    Acute kidney injury superimposed on stage 4 chronic kidney disease ([facility])  Lab Results   Component Value Date    EGFR 17 11/04/2024    EGFR 17 11/01/2024    EGFR 17 10/30/2024    CREATININE 3.66 (H) 11/04/2024    CREATININE 3.53 (H) 11/01/2024    CREATININE 3.63 (H) 10/30/2024     Recent creatinine of 3.63 10/30 Prior baseline around 2.9-3.0  Etiology felt to be related potentially to ATN in the setting of  uncontrolled hypertension and complicated by contrast associated nephropathy  Nephrology was consulted and followed and a UA showed microhematuria and proteinuria.  An ultrasound of the kidney/bladder showed wall thickening but no hydronephrosis  Bladder scans negative for retention  Monitor BMP triweekly or sooner if clinically indicated  Demadex has been on hold and had periodically required IV fluids  Nephrology following  Follow with nephrology as an outpatient or sooner on the ARC if any acute changes  Encephalopathy  Had lethargy on exam back on 10/17 in acute care with improvements however more recently had issues with confusion and decreased consciousness in the mornings that improves throughout the day with unclear etiology  Prior history of cognitive impairments with last MoCA score of 18  CT of the head was stable, B12 level (446) wnl  Was evaluated by psychiatry with no changes in medications recommended  EEG showed no epileptiform activity just moderate nonspecific dysfunction  Will need to monitor especially with change in environment now on the ARC for any changes  Impaired mobility and activities of daily living  Patient was evaluated by the rehabilitation team MD and an appropriate candidate for acute inpatient rehabilitation program at this time.  The patient will tolerate 3 hours/day 5 to 7 days/week of intensive physical, occupational and speech therapy in order to obtain goals for community discharge  Due to the patient's functional Compared to their baseline level of function in addition to their ongoing medical needs, the patient would benefit from daily supervision from a rehabilitation physician as well as rehabilitation nursing to implement and adjust the medical as well as functional plan of care in order to meet the patient's goals.  DC: TBD reteam   At risk for alteration in bowel function  Constipated no bm for several days; lactulose PRN  Start colase, senna daily and miralax  prn  Titrate as needed   Overgrown toenails  Cs podiatry   Vitamin D deficiency  <7 10/7  Mgmt per IM/nephrology   Fall during current hospitalization  Patient with fall to knees at shift change 10/31   Denied any knee pain  Get larger bariatric chair   Fall precautions.     History of Present Illness   Tommie Taylor is a 59 y.o. male with history of bipolar disorder, CKD stage IV, type 2 diabetes, hypertension, tardive dyskinesia, sleep apnea, GERD who presented to the [facility] on 10/14/2024 for right-sided weakness and facial droop.  MRI revealed a left corona radiata infarct and neurology was consulted and was administered TNK.  He initially required a Cardene drip for his hypertensive emergency and was eventually placed on dual antiplatelet therapy with plan to continue aspirin and Plavix until that date and continue with aspirin as monotherapy as well as atorvastatin for secondary stroke prophylaxis.  He was recommended for Zio patch placement as an outpatient.  His hospital course was complicated by acute kidney injury on top of his CKD thought to be related to ATN in the setting of uncontrolled blood pressure as well as contrast associated nephropathy.  Nephrology did follow during his course and had some levels of improvement however has not returned back to his baseline level of creatinine.  Additionally there was some levels of encephalopathy which are stable at this time. The patient was evaluated by the Rehabilitation team and deemed an appropriate candidate for comprehensive inpatient rehabilitation and admitted to the [facility] on 10/26/2024  5:13 PM     Rehab Diagnosis: Impairment of mobility, safety, Activities of Daily Living (ADLs), and cognitive/communication skills due to Stroke:  01.2  Right Body Involvement (Left Brain)  Etiologic Dx: Left Corona Radiata Infarct  Date of Onset: 10/14/2024   Date of surgery: N/A  Chief Complaint: f/u stroke    Interval: Patient seen and  examined in wheelchair. No events overnight.  Reports overall feeling well. Last BM 11/4. Notes poor sleep overnight due to episode of urinary incontinence and had to be changed. Encourage to use bowel medications. Denies any f/c/n/v, CP, SOB, abdominal pain, constipation, or diarrhea.       Objective   Functional Update:  Physical Therapy Occupational Therapy Speech Therapy   Weight Bearing Status: Full Weight Bearing  Transfers: Moderate Assistance  Bed Mobility: Moderate Assistance  Amulation Distance (ft): 10 feet  Ambulation: Moderate Assistance  Assistive Device for Ambulation: Alberto Walker  Wheelchair Mobility Distance: 150 ft  Wheelchair Mobility: Maximum Assistance  Assistive Device for Stairs: Lehft Hand Rail  Stair Assistance: Moderate Assistance  Discharge Recommendations:  (TBD pending support)   Eating: Supervision (KELLY, relying on compensatory tech)  Grooming: Supervision (KELLY, relying on compensatory tech)  Bathing: Moderate Assistance  Bathing: Moderate Assistance  Upper Body Dressing: Moderate Assistance  Lower Body Dressing: Moderate Assistance, Maximum Assistance  Toileting: Maximum Assistance  Toilet Transfer: Moderate Assistance  Cognition: Exceptions to WNL  Cognition: Decreased Memory, Decreased Executive Functions, Decreased Attention, Decreased Comprehension, Decreased Safety, Behavioral Considerations, Impulsive  Orientation: Person, Place   Mode of Communication: Verbal  Speech/Language: Dysarthia  Cognition: Exceptions to WNL  Cognition: Decreased Memory, Decreased Executive Functions, Decreased Attention, Decreased Comprehension  Orientation: Person, Time, Situation  Discharge Recommendations:  (pending pt progress)         Temp:  [96.4 °F (35.8 °C)-97.6 °F (36.4 °C)] 97.2 °F (36.2 °C)  HR:  [67-80] 80  Resp:  [18] 18  BP: (113-144)/(65-73) 113/68  SpO2:  [98 %-99 %] 99 %    Physical Exam    Gen: No acute distress, obese  HEENT: Moist mucus membranes,  Normocephalic/Atraumatic  Cardiovascular: Regular rate, rhythm,  Heme/Extr: mild bilateral LE edema   Pulmonary: Non-labored breathing.   : No amezquita  GI:  non-distended. BS+  MSK: RUE RLE weakness, R hip flexion and DF/PF at least antigravity, 2/5 R elbow flexion   Integumentary: Skin is warm, dry.   Neuro: Speech is intact. Appropriate to questioning.  Psych: Normal mood and affect.       Scheduled Meds:  Current Facility-Administered Medications   Medication Dose Route Frequency Provider Last Rate    acetaminophen  650 mg Oral Q6H PRN Alex S George, DO      aspirin  81 mg Oral Daily Alex S George, DO      atorvastatin  40 mg Oral QPM Alex S George, DO      buPROPion  100 mg Oral BID Alex S George, DO      calcium carbonate  1,000 mg Oral BID PRN Alex S George, DO      docusate sodium  100 mg Oral BID Cathy Miranda MD      fluticasone  1 spray Each Nare Daily SAMANTHA Kern      heparin (porcine)  7,500 Units Subcutaneous Q8H MIGEL Alex S George, DO      hydrALAZINE  10 mg Oral Q8H MIGEL SAMANTHA Kern      insulin lispro  1-5 Units Subcutaneous HS Alex S George, DO      insulin lispro  1-6 Units Subcutaneous TID AC Alex S George, DO      labetalol  300 mg Oral BID Alex S George, DO      lactulose  20 g Oral TID PRN Cathy Miranda MD      loratadine  5 mg Oral Daily SAMANTHA Kern      NIFEdipine  90 mg Oral Daily Alex S George, DO      OLANZapine  10 mg Oral HS Alex S George, DO      ondansetron  4 mg Oral Q6H PRN SAMANTHA Kern      pantoprazole  40 mg Oral Early Morning Alex S George, DO      polyethylene glycol  17 g Oral Daily Cathy Miranda MD      senna  2 tablet Oral HS Cathy Miranda MD      traZODone  50 mg Oral HS PRN Alex S George, DO      Valbenazine Tosylate  40 mg Oral HS Alex S George, DO           Lab Results: I have reviewed the following results:  Results from last 7 days   Lab Units 11/04/24  0504 10/30/24  0601   HEMOGLOBIN g/dL 9.1* 9.4*    HEMATOCRIT % 28.3* 27.5*   WBC Thousand/uL 6.41 7.66   PLATELETS Thousands/uL 167 192     Results from last 7 days   Lab Units 11/04/24  0504 11/01/24  0546 10/30/24  0601   BUN mg/dL 29* 27* 32*   SODIUM mmol/L 138 138 138   POTASSIUM mmol/L 4.8 4.7 4.6   CHLORIDE mmol/L 108 109* 109*   CREATININE mg/dL 3.66* 3.53* 3.63*              Cathy Miranda MD   Physical Medicine and Rehabilitation   11/05/24    I have spent a total time of 37 minutes in caring for this patient on the day of the visit/encounter including Instructions for management, Counseling / Coordination of care, Documenting in the medical record, and Communicating with other healthcare professionals .

## 2024-11-05 NOTE — ASSESSMENT & PLAN NOTE
Hgb currently 9.1.  Hemoglobin 12.1 on admission.   Most recent iron panel on 10/15: Iron Sat 38%, TIBC 204, Iron 77, and Ferritin 111.  No need for iron supplementation at this time.  FOBT negative on 10/29. Repeat FOBT 11/2 negative.  Nephrology recommends venofer x3 doses.   11/5 Venofer initiated per nephrology.   Continue to trend routine CBC.

## 2024-11-05 NOTE — ASSESSMENT & PLAN NOTE
Patient with uncontrolled hypertension and diabetes presents with right upper and right lower extremity weakness as well as facial droop  A CT of the head as well as a CTA of the head and neck were completed with negative for acute abnormalities for an acute process  TNK had been administered following the correction of his hypertension after being placed on a Cardene drip  After ministration of the TNK developed worsening dysarthria as well as headache and a repeat CT was still negative.  The 24 post TNK CT was also negative  Neurology followed the patient and MRI was completed and was significant for left corona radiata stroke  Placed on dual antiplatelet therapy for 3 weeks with plans for monotherapy with aspirin and statin indefinitely for secondary stroke prophylaxis  Recommendation for a Zio patch as an outpatient  Follow-up with neurovascular attending in 6 to 8 weeks  Physical, occupational and speech therapy while on the acute rehabilitation unit  Episode of worsening RLE weakness, CTH 11/4 no acute changes.

## 2024-11-05 NOTE — ASSESSMENT & PLAN NOTE
Presented with hypertensive emergency on initial hospitalization.   Concerns about medication non-compliance.  Home regimen: labetalol 100mg BID and amlodipine 10mg daily.  Target SBP 140s, currently on labetalol 300mg BID and Nifedipine 90mg daily with hydralazine 25mg Q8 hours.   Decrease hydralazine to 10mg Q8 today.  Nephrology following.

## 2024-11-05 NOTE — PLAN OF CARE
Problem: SAFETY ADULT  Goal: Patient will remain free of falls  Description: INTERVENTIONS:  - Educate patient/family on patient safety including physical limitations  - Instruct patient to call for assistance with activity   - Consult OT/PT to assist with strengthening/mobility   - Keep Call bell within reach  - Keep bed low and locked with side rails adjusted as appropriate  - Keep care items and personal belongings within reach  - Initiate and maintain comfort rounds  - Make Fall Risk Sign visible to staff  - Apply yellow socks and bracelet for high fall risk patients  - Consider moving patient to room near nurses station  Outcome: Progressing     Problem: Prexisting or High Potential for Compromised Skin Integrity  Goal: Skin integrity is maintained or improved  Description: INTERVENTIONS:  - Identify patients at risk for skin breakdown  - Assess and monitor skin integrity  - Assess and monitor nutrition and hydration status  - Monitor labs   - Assess for incontinence   - Turn and reposition patient  - Assist with mobility/ambulation  - Relieve pressure over bony prominences  - Avoid friction and shearing  - Provide appropriate hygiene as needed including keeping skin clean and dry  - Evaluate need for skin moisturizer/barrier cream  - Collaborate with interdisciplinary team   - Patient/family teaching  - Consider wound care consult   Outcome: Progressing

## 2024-11-05 NOTE — PROGRESS NOTES
Progress Note - Internal Medicine   Name: Tommie Taylor 59 y.o. male I MRN: [id number]  Unit/Bed#: -01 I Date of Admission: 10/26/2024   Date of Service: 11/5/2024 I Hospital Day: 10    Assessment & Plan  CVA (cerebral vascular accident) (HCC)  Initial ED presentation 10/14/2024 with RUE and RLE extremity weakness and R facial droop. CTH and CTA were initially negative for acute process, LKW was 1 hour prior to presentation.   , cardene drip initiated to stabilized BP, TNK administration after stabilization of HTN. Symptoms worsened s/p TNK but f/u CT again demonstrated no acute process.   24 hr post-TNK CT negative.   MRI completed, demonstrated indicated left corona radiata stroke.   DAPT initiated for 3 weeks with plan for transition to asa monotherapy.   Continue ASA 81mg and Plavix 75mg daily until 11/6 then transition to ASA 81mg daily monotherapy.  Cardiology recommends Zio patch after discharge.  Follow up with Neurovascular after discharge.     PT/OT/ST per primary team.     Complaints of worsening RLE weakness on 11/4 after therapies - obtain CTH.  11/5 CTH shows no new process, stable from last.   Bipolar I disorder, severe, current or most recent episode depressed, with psychotic features ([facility], located within [facility])  Currently on bupropion 100mg BID and olanzapine 10mg at HS with valbenazine tosylate 40mg for tardive dyskinesia per home regimen. Follow up outpatient with psychiatry.   GERD (gastroesophageal reflux disease)  EGD with GI 10/14, directed to continue with Protonix. Tums available PRN for dyspepsia.   Insomnia  Educate and encourage on sleep hygiene. Continue Trazodone at HS for sleep.   DAISY (obstructive sleep apnea)  Continue use of CPAP with home settings.  Does not use CPAP at home - currently broken.  Recommend overnight oximetry prior to discharge.   Anemia, unspecified  Hgb currently 9.1.  Hemoglobin 12.1 on admission.   Most recent iron panel on 10/15: Iron Sat 38%, TIBC 204, Iron 77, and  Ferritin 111.  No need for iron supplementation at this time.  FOBT negative on 10/29. Repeat FOBT 11/2 negative.  Nephrology recommends venofer x3 doses.   11/5 Venofer initiated per nephrology.   Continue to trend routine CBC.  Hyperlipidemia  Most recent lipid panel 10/16/24: Cholesterol 154, triglycerides 120, HDL 40, LDL 90.  Continue Lipitor 40mg daily, increased from home 20mg daily.  Class 3 severe obesity due to excess calories with serious comorbidity and body mass index (BMI) of 40.0 to 44.9 in adult ([facility])  BMI 42.74 on admission. Encourage lifestyle modifications and provide support for nutritional teaching. Consult placed to nutrition services during acute course.   Tardive dyskinesia  Continue on home treatment Valbenazine Tosylate 40mg HS.   Hypertension  Presented with hypertensive emergency on initial hospitalization.   Concerns about medication non-compliance.  Home regimen: labetalol 100mg BID and amlodipine 10mg daily.  Target SBP 140s, currently on labetalol 300mg BID and Nifedipine 90mg daily with hydralazine 25mg Q8 hours.   Decrease hydralazine to 10mg Q8 today.  Nephrology following.   DM2 (diabetes mellitus, type 2) ([facility])  Lab Results   Component Value Date    HGBA1C 8.6 (H) 10/16/2024       Recent Labs     11/04/24  1107 11/04/24  1615 11/04/24  2100 11/05/24  0552   POCGLU 169* 104 106 88       Blood Sugar Average: Last 72 hrs:  (P) [id number]    Continue insulin management with sliding scale.  Pt had been using ozempic for home glucose management but ran out and did not have any other glycemic control agents.     Acute kidney injury superimposed on stage 4 chronic kidney disease ([facility])  Lab Results   Component Value Date    EGFR 17 11/04/2024    EGFR 17 11/01/2024    EGFR 17 10/30/2024    CREATININE 3.66 (H) 11/04/2024    CREATININE 3.53 (H) 11/01/2024    CREATININE 3.63 (H) 10/30/2024     Creatinine currently 3.66.  Baseline creatinine 2.5-2.9.    Avoid nephrotoxins.  Home  diuretics currently on hold.  Encourage hydration.  Renal ultrasound demonstrates no hydro but mild bladder wall thickening.   Nephrology following.  Labs MWF per Nephro.  Follows with Dr. No (Nephrology) as outpatient.  Encephalopathy  Lethargy and encephalopathic symptoms noted during acute course.  B12 and head CT WNL. No subsequent episodes noted.   EEG on 10/22 consistent with moderate nonspecific cerebral dysfunction, no seizure activity.  Continue to monitor behavior and symptoms for signs of changes.   Most recent MoCA was 18/30 in 04/2024.  Per Psych - if continues to have delirium consider discontinuing Wellbutrin.  Concerns DAISY could also be contributing to encephalopathy per acute care.  Vitamin D deficiency  Vitamin D level <7 on 10/7.  Continue home vitamin D 50,000u weekly.  Continue to follow-up with Nephrology as outpatient.  Impaired mobility and activities of daily living  Continue PT/OT/ST treatment.   Overgrown toenails  Podiatry consult.   Orthostatic hypotension  Orthostatic hypotension noted during new episodes of patient becoming fatigued during therapies since 11/4.   New interventions:   Change hydralazine to 10mg Q8 scheduled from 25mg   Apply abdominal binder and guillermo hose for therapies  Lactulose ordered to induce BM to check FOBT   Venofer initiated for anemia x3 doses   Nephrology consulted and following.     VTE Pharmacologic Prophylaxis:   Pharmacologic: Heparin  Mechanical VTE Prophylaxis in Place: Yes    Current Length of Stay: 10 day(s)    Current Patient Status: Inpatient Rehab     Discharge Plan: As per treatment team     Code Status: Level 1 - Full Code    Subjective:   Pt received up in bed. Reports increasing fatigue and difficulty participating in therapy with episodes of difficulty moving, reports his blood pressure has been low in therapy. Reports no BM since the weekend. Denies SOB, chest pain, chills, or fever. Endorses fatigue, waxing/waning weakness. Pt agreeable to  plan of care adjustments for orthostatic hypotension and initiation of venofer for his anemia.     Objective:     Vitals:   Temp (24hrs), Av.1 °F (36.2 °C), Min:96.4 °F (35.8 °C), Max:97.6 °F (36.4 °C)    Temp:  [96.4 °F (35.8 °C)-97.6 °F (36.4 °C)] 97.2 °F (36.2 °C)  HR:  [67-80] 80  Resp:  [18] 18  BP: (113-144)/(65-73) 113/68  SpO2:  [98 %-99 %] 99 %  Body mass index is 42.74 kg/m².     Review of Systems   Constitutional:  Positive for fatigue. Negative for chills, diaphoresis and fever.   HENT:  Positive for drooling (R facial droop) and rhinorrhea. Negative for ear pain and sore throat.    Eyes:  Negative for pain and visual disturbance.   Respiratory:  Negative for cough, chest tightness, shortness of breath and wheezing.    Cardiovascular:  Negative for chest pain, palpitations and leg swelling.   Gastrointestinal:  Positive for constipation. Negative for abdominal pain, diarrhea, nausea and vomiting.   Genitourinary:  Negative for dysuria and hematuria.   Musculoskeletal:  Negative for arthralgias, back pain and myalgias.   Skin:  Negative for color change and rash.   Neurological:  Positive for dizziness (Orthostasis) and weakness (RUE, RLE). Negative for seizures, syncope and headaches.   Psychiatric/Behavioral:  Negative for confusion. The patient is not nervous/anxious.    All other systems reviewed and are negative.       Input and Output Summary (last 24 hours):       Intake/Output Summary (Last 24 hours) at 2024 1026  Last data filed at 2024 0900  Gross per 24 hour   Intake 720 ml   Output 1151 ml   Net -431 ml       Physical Exam:     Physical Exam  Vitals and nursing note reviewed.   Constitutional:       Appearance: Normal appearance. He is obese. He is not ill-appearing.   HENT:      Head: Normocephalic and atraumatic.      Nose: Nose normal.      Mouth/Throat:      Mouth: Mucous membranes are moist.   Cardiovascular:      Rate and Rhythm: Normal rate and regular rhythm.      Pulses:  Normal pulses.      Heart sounds: Normal heart sounds. No murmur heard.     No friction rub.   Pulmonary:      Effort: Pulmonary effort is normal.      Breath sounds: Normal breath sounds. No wheezing or rhonchi.   Abdominal:      General: Bowel sounds are normal.      Palpations: Abdomen is soft.      Tenderness: There is no abdominal tenderness. There is no guarding.   Musculoskeletal:      Cervical back: Normal range of motion.      Right lower le+ Edema present.      Left lower le+ Edema present.   Skin:     General: Skin is warm and dry.      Capillary Refill: Capillary refill takes less than 2 seconds.      Findings: No bruising or erythema.   Neurological:      Mental Status: He is alert and oriented to person, place, and time. Mental status is at baseline.   Psychiatric:         Mood and Affect: Mood normal.         Behavior: Behavior normal.         Additional Data:     Labs:    Results from last 7 days   Lab Units 24  0504   WBC Thousand/uL 6.41   HEMOGLOBIN g/dL 9.1*   HEMATOCRIT % 28.3*   PLATELETS Thousands/uL 167   SEGS PCT % 56   LYMPHO PCT % 28   MONO PCT % 11   EOS PCT % 3     Results from last 7 days   Lab Units 24  0504   SODIUM mmol/L 138   POTASSIUM mmol/L 4.8   CHLORIDE mmol/L 108   CO2 mmol/L 24   BUN mg/dL 29*   CREATININE mg/dL 3.66*   ANION GAP mmol/L 6   CALCIUM mg/dL 8.4   GLUCOSE RANDOM mg/dL 115         Results from last 7 days   Lab Units 24  0552 24  2100 24  1615 24  1107 24  0557 24  2034 24  1604 24  1145 24  0553 24  2014 24  1607 24  1044   POC GLUCOSE mg/dl 88 106 104 169* 120 143* 158* 137 103 143* 104 120               Labs reviewed    Imaging:    Imaging reviewed    Recent Cultures (last 7 days):           Last 24 Hours Medication List:   Current Facility-Administered Medications   Medication Dose Route Frequency Provider Last Rate    acetaminophen  650 mg Oral Q6H PRN Alex George  DO      aspirin  81 mg Oral Daily Alex S George, DO      atorvastatin  40 mg Oral QPM Alex S George, DO      buPROPion  100 mg Oral BID Alex S George, DO      calcium carbonate  1,000 mg Oral BID PRN Alex S George, DO      docusate sodium  100 mg Oral BID Cathy Miranda MD      fluticasone  1 spray Each Nare Daily SAMANTHA Kern      heparin (porcine)  7,500 Units Subcutaneous Q8H MIGEL Alex S George, DO      hydrALAZINE  25 mg Oral Q8H MIGEL Alex S George, DO      insulin lispro  1-5 Units Subcutaneous HS Alex S George, DO      insulin lispro  1-6 Units Subcutaneous TID AC Alex S George, DO      labetalol  300 mg Oral BID Alex S George, DO      lactulose  20 g Oral TID PRN Cathy Miranda MD      loratadine  5 mg Oral Daily SAMANTHA Kern      NIFEdipine  90 mg Oral Daily Alex S George, DO      OLANZapine  10 mg Oral HS Alex S George, DO      ondansetron  4 mg Oral Q6H PRN SAMANTHA Kern      pantoprazole  40 mg Oral Early Morning Alex S George, DO      polyethylene glycol  17 g Oral Daily Cathy Miranda MD      senna  2 tablet Oral HS Cathy Miranda MD      traZODone  50 mg Oral HS PRN Alex S George, DO      Valbenazine Tosylate  40 mg Oral HS Alex S George, DO          M*Modal software was used to dictate this note.  It may contain errors with dictating incorrect words or incorrect spelling. Please contact the provider directly with any questions.

## 2024-11-05 NOTE — ASSESSMENT & PLAN NOTE
Constipated no bm for several days; lactulose PRN  Start colase, senna daily and miralax prn  Titrate as needed

## 2024-11-05 NOTE — ASSESSMENT & PLAN NOTE
Lab Results   Component Value Date    HGBA1C 8.6 (H) 10/16/2024       Recent Labs     11/04/24  1615 11/04/24  2100 11/05/24  0552 11/05/24  1113   POCGLU 104 106 88 111     Most recent hemoglobin A1c of 8.6 and technically uncontrolled although blood sugars in the hospital have been well-controlled  Currently on semaglutide as an outpatient but was on hold due to ROSINA in the hospital  Continue sliding scale and 4 times daily Accu-Cheks and as well as a diabetic diet

## 2024-11-05 NOTE — ASSESSMENT & PLAN NOTE
Lab Results   Component Value Date    HGBA1C 8.6 (H) 10/16/2024       Recent Labs     11/04/24  1107 11/04/24  1615 11/04/24  2100 11/05/24  0552   POCGLU 169* 104 106 88       Blood Sugar Average: Last 72 hrs:  (P) [id number]    Continue insulin management with sliding scale.  Pt had been using ozempic for home glucose management but ran out and did not have any other glycemic control agents.

## 2024-11-05 NOTE — ASSESSMENT & PLAN NOTE
Initial ED presentation 10/14/2024 with RUE and RLE extremity weakness and R facial droop. CTH and CTA were initially negative for acute process, LKW was 1 hour prior to presentation.   , cardene drip initiated to stabilized BP, TNK administration after stabilization of HTN. Symptoms worsened s/p TNK but f/u CT again demonstrated no acute process.   24 hr post-TNK CT negative.   MRI completed, demonstrated indicated left corona radiata stroke.   DAPT initiated for 3 weeks with plan for transition to asa monotherapy.   Continue ASA 81mg and Plavix 75mg daily until 11/6 then transition to ASA 81mg daily monotherapy.  Cardiology recommends Zio patch after discharge.  Follow up with Neurovascular after discharge.     PT/OT/ST per primary team.     Complaints of worsening RLE weakness on 11/4 after therapies - obtain CTH.  11/5 CTH shows no new process, stable from last.

## 2024-11-05 NOTE — PROGRESS NOTES
11/05/24 0930   Pain Assessment   Pain Assessment Tool 0-10   Pain Score No Pain   Restrictions/Precautions   Precautions Bed/chair alarms;Cognitive;Fall Risk;Supervision on toilet/commode   Weight Bearing Restrictions No   ROM Restrictions No   General   Change In Medical/Functional Status Noted as yesterday increased neuro symptoms with longer distances and higher challenges-  Took standing BP which noted to be dropping which notified CRNP and PT   Sit to Stand   Type of Assistance Needed Physical assistance   Physical Assistance Level 25% or less   Comment CS but when BP dropped pt needed Min A   Sit to Stand CARE Score 3   Bed-Chair Transfer   Type of Assistance Needed Physical assistance   Physical Assistance Level 25% or less   Comment CG to Min A std pivot no device   Chair/Bed-to-Chair Transfer CARE Score 3   Walk 10 Feet   Type of Assistance Needed Incidental touching   Comment CG no device   Walk 10 Feet CARE Score 4   Walk 50 Feet with Two Turns   Type of Assistance Needed Incidental touching   Comment CG no device   Walk 50 Feet with Two Turns CARE Score 4   Walk 150 Feet   Type of Assistance Needed Physical assistance   Physical Assistance Level 26%-50%   Comment Mod A by end of distance  - no deivce   Walk 150 Feet CARE Score 3   Walking 10 Feet on Uneven Surfaces   Comment (S)  should try ramp or floor mat soon   Ambulation   Primary Mode of Locomotion Prior to Admission Walk   Distance Walked (feet) 150 ft  (60x5)   Gait Pattern Inconsistant Lesley;Slow Lesley;Decreased foot clearance;R foot drag;R hemiparesis;Improper weight shift   Provided Assistance with: Balance   Walk Assist Level Moderate Assist   Findings Started session no device and focused on higher reps of house hold distances of 60 feet,  pt was CG/ even CS level but with fatigue and longer distances pt scuffing R leg more and poor clearance which turns into Mod A- this most likely due to dropping in BP   Does the patient walk? 2.  Yes   Wheel 50 Feet with Two Turns   Type of Assistance Needed Physical assistance   Physical Assistance Level 25% or less   Comment Min A at doorway   Wheel 50 Feet with Two Turns CARE Score 3   Wheelchair mobility   Does the patient use a wheelchair? 1. Yes   Type of Wheelchair Used 1. Manual   Distance Level Surface (feet) 50 ft   Findings Toward end of session when pt was very fatigued and BP lower , had performed WC mobility using BLE and LUE but RLE lag and poor coordination so used just estefania style   4 Steps   Type of Assistance Needed Physical assistance   Physical Assistance Level 25% or less   Comment Min A with reciprocal pattern with single rail   4 Steps CARE Score 3   12 Steps   Type of Assistance Needed Physical assistance   Physical Assistance Level 25% or less   Comment Min A with reciprocal pattern single rail- R heel catch on descent   12 Steps CARE Score 3   Therapeutic Interventions   Neuromuscular Re-Education wt shift and reaction kicking ball  performed unsupported 2 sets to fatigue (kicking is delayed and also re setting balance delayed),  Amb 30x2 with 2.5# on RLE   Other sitting BP manual 120/78  and standing 100/60,   Other Comments   Comments Trialed SPC - pt made it 30 feet and then needed to sit down so didnt get good assessment   Assessment   Treatment Assessment 90 min skilled PT session focused on house hold distance amb bouts which pt was showing great progress with mostly at CG level no device,  after steps and longer distance amb pt stated about R leg very fatigued again and checked BP sitting vs standing which noted 20 point drop.  Also noted pt just very sleepy t/o session,  notified CRNP and PT.  This session got WC out and educated and practiced WC mobility,  pt very slow, and some poor motor planning,  at end of session got pt in bed which he fell asleep very quickly.  Cont neuro program as able next session, watch standing BPs.   Problem List Decreased strength;Decreased  range of motion;Decreased endurance;Impaired balance;Decreased mobility;Decreased coordination;Decreased cognition;Impaired judgement;Decreased safety awareness   Barriers to Discharge Inaccessible home environment;Decreased caregiver support   PT Barriers   Functional Limitation Car transfers;Stair negotiation;Standing;Transfers;Walking   Plan   Progress Slow progress, decreased activity tolerance   PT Therapy Minutes   PT Time In 0930   PT Time Out 1100   PT Total Time (minutes) 90   PT Mode of treatment - Individual (minutes) 90   PT Mode of treatment - Concurrent (minutes) 0   PT Mode of treatment - Group (minutes) 0   PT Mode of treatment - Co-treat (minutes) 0   PT Mode of Treatment - Total time(minutes) 90 minutes   PT Cumulative Minutes 715   Therapy Time missed   Time missed? No

## 2024-11-05 NOTE — ASSESSMENT & PLAN NOTE
Orthostatic hypotension noted during new episodes of patient becoming fatigued during therapies since 11/4.   New interventions:   Change hydralazine to 10mg Q8 scheduled from 25mg   Apply abdominal binder and guillermo hose for therapies  Lactulose ordered to induce BM to check FOBT   Venofer initiated for anemia x3 doses   Nephrology consulted and following.

## 2024-11-05 NOTE — PROGRESS NOTES
"   24 0900   Pain Assessment   Pain Assessment Tool 0-10   Pain Score No Pain   Restrictions/Precautions   Precautions Bed/chair alarms;Cognitive;Fall Risk;Supervision on toilet/commode   Comprehension   Comprehension (FIM) 4 - Understands basic info/conversation 75-90% of time   Expression   Expression (FIM) 3 - Needs to repeat parts of sentences   Social Interaction   Social Interaction (FIM) 4 - Needs redirecting for appropriate language or to initiate interaction   Problem Solving   Problem solving (FIM) 3 - Solves basic problmes 50-74% of time   Memory   Memory (FIM) 3 - Recognizes, recalls/performs 50-74%   Memory Skills   Orientation Level Oriented X4   Speech/Language/Cognition Assessment   Treatment Assessment Pt participated in skilled SLP session focusing on cognitive linguistic and speech intelligibility skills. Engaged in brief rapport building targeting LTM and STM recall. Pt accurately recalled LTM biographical info such as his address,  and family information. Pt also provided info into his PLOF, stating that he was independent for medication and finance management prior to admission. He reported feeling \"off\" for both his cognition and speech clarity. Plan to target IADLs in tomorrow's session. All speech was intelligible when pt provided single word and shorter phrase responses, noting the need for repetition of info to improve his speech clarity when providing longer responses such as at the sentence or longer phrase levels.    Targeting memory, pt was educated on different types of memory, along with strategies to improve retention and recall to include repetition, verbal rehearsal, writing, making associations and visualization. SLP provided examples of how to incorporate these strategies into daily routines/tasks. Focusing on visual memory, presented with a picture scene for ~2 minutes, pt then recalled 4/11 details independently, most commonly requiring mod to max verbal and semantic " "cuing with occasional total assist. Engaged in a word list retention task to target auditory and working memory, pt was verbally presented with Fo4 words, pt accurately recalled words by attribute inclusion with 8/12 accuracy, improving to 11/12 when provided with a single repetition of stimuli. Throughout session, pt was noted to be fatigued, frequently falling asleep and requiring verbal stimuli to remain awake. Pt reported having a decent night of sleep and not receiving any new medications which may make him sleepy but did state \"I'm always tired now\" in regards to since having his stroke. SLP notified DESHAWN Jalloh of increased fatigue/sleepiness. Additionally, pt presented with functional speech intelligibility at the word and very short phrase levels with known context.    At this time, pt is recommended for and will continue to benefit form further skilled SLP services focusing on overall cognitive linguistic skills and speech intelligibility skills in order to maximize independence on discharge.   SLP Therapy Minutes   SLP Time In 0900   SLP Time Out 0930   SLP Total Time (minutes) 30   SLP Mode of treatment - Individual (minutes) 30   SLP Mode of treatment - Concurrent (minutes) 0   SLP Mode of treatment - Group (minutes) 0   SLP Mode of treatment - Co-treat (minutes) 0   SLP Mode of Treatment - Total time(minutes) 30 minutes   SLP Cumulative Minutes 275   Therapy Time missed   Time missed? No       "

## 2024-11-05 NOTE — PROGRESS NOTES
11/05/24 1330   Pain Assessment   Pain Assessment Tool 0-10   Pain Score No Pain   Subjective   Subjective pt asleep upon PT arrival but agreeable to participate in tx   Sit to Lying   Type of Assistance Needed Physical assistance;Verbal cues   Physical Assistance Level 26%-50%   Comment for BLE management   Sit to Lying CARE Score 3   Lying to Sitting on Side of Bed   Type of Assistance Needed Physical assistance;Verbal cues;Adaptive equipment   Physical Assistance Level 26%-50%   Comment assist for BLE management and trunk control. practiced in hospital bed and mat table. cues for safety as he nears sliding off edge of bed upon sitting   Lying to Sitting on Side of Bed CARE Score 3   Sit to Stand   Type of Assistance Needed Physical assistance;Verbal cues   Physical Assistance Level 25% or less   Comment min A without AD from various surfaces   Sit to Stand CARE Score 3   Walk 10 Feet   Type of Assistance Needed Incidental touching;Verbal cues   Comment CGA no AD   Walk 10 Feet CARE Score 4   Walk 50 Feet with Two Turns   Type of Assistance Needed Physical assistance;Verbal cues   Physical Assistance Level 25% or less   Comment min A with cane and no AD   Walk 50 Feet with Two Turns CARE Score 3   Walking 10 Feet on Uneven Surfaces   Comment (S)  trial ramp next session as able   Ambulation   Primary Mode of Locomotion Prior to Admission Walk   Distance Walked (feet) 75 ft  (x4, shorter distances in gym)   Assist Device Cane  (vs none)   Gait Pattern Inconsistant Lesley;Decreased foot clearance;R foot drag;R hemiparesis;Lateral deviation;Narrow JENNA;Improper weight shift   Limitations Noted In Balance;Endurance;Coordination;Heel Strike;Safety;Speed;Strength;Swing   Provided Assistance with: Balance;Weight Shift   Walk Assist Level Minimum Assist   Findings CGA to min A without AD with manual cues for weight shifting; Patria  with SPC with reduced RLE clearance noted and increased episodes of lateral sway. cues  for increased step height and improved heel strike RLE especially as he fatigues   Does the patient walk? 2. Yes   Therapeutic Interventions   Strengthening supine: straight leg raises, bridges, hip abd with yellow theraband, alternating marches with yellow theraband. all 3 sets of 10 with rest breaks provided PRN.   Balance Standing with one UE support on parallel bar: alternating marches 3 sets of 10. fwd stepping over canes (various heights) 6 laps, side stepping over canes 6 laps.   Other manual /82mmHg start of session   Assessment   Treatment Assessment patient participated in 60 minute PT tx session focusing on household distance gait with and without AD, standing balance activities promoting RLE step height/clearance, and supine strengthening program. He actually demos more stability when NOT using cane, requiring consistent Min A with SPC and CGA without AD. Gait deficits exacerbate as he fatigues with almost constant cueing required at end of session for inc R step height and heel strike. He will continue to benefit from skilled PT interventions to address deficits, reduce risk for falls, and maximize functional independence,   Problem List Decreased strength;Decreased endurance;Impaired balance;Decreased mobility;Decreased coordination;Impaired judgement;Decreased safety awareness;Obesity   Barriers to Discharge Inaccessible home environment;Decreased caregiver support   Plan   Treatment/Interventions Functional transfer training;LE strengthening/ROM;Elevations;Therapeutic exercise;Endurance training;Bed mobility;Gait training;Patient/family training;Equipment eval/education   Progress Progressing toward goals   PT Therapy Minutes   PT Time In 1330   PT Time Out 1430   PT Total Time (minutes) 60   PT Mode of treatment - Individual (minutes) 60   PT Mode of treatment - Concurrent (minutes) 0   PT Mode of treatment - Group (minutes) 0   PT Mode of treatment - Co-treat (minutes) 0   PT Mode of  Treatment - Total time(minutes) 60 minutes   PT Cumulative Minutes 775   Therapy Time missed   Time missed? No

## 2024-11-06 LAB
ANION GAP SERPL CALCULATED.3IONS-SCNC: 9 MMOL/L (ref 4–13)
BASOPHILS # BLD AUTO: 0.04 THOUSANDS/ΜL (ref 0–0.1)
BASOPHILS NFR BLD AUTO: 1 % (ref 0–1)
BUN SERPL-MCNC: 31 MG/DL (ref 5–25)
CALCIUM SERPL-MCNC: 8.5 MG/DL (ref 8.4–10.2)
CHLORIDE SERPL-SCNC: 108 MMOL/L (ref 96–108)
CO2 SERPL-SCNC: 22 MMOL/L (ref 21–32)
CREAT SERPL-MCNC: 3.65 MG/DL (ref 0.6–1.3)
EOSINOPHIL # BLD AUTO: 0.24 THOUSAND/ΜL (ref 0–0.61)
EOSINOPHIL NFR BLD AUTO: 5 % (ref 0–6)
ERYTHROCYTE [DISTWIDTH] IN BLOOD BY AUTOMATED COUNT: 14.9 % (ref 11.6–15.1)
GFR SERPL CREATININE-BSD FRML MDRD: 17 ML/MIN/1.73SQ M
GLUCOSE SERPL-MCNC: 106 MG/DL (ref 65–140)
GLUCOSE SERPL-MCNC: 110 MG/DL (ref 65–140)
GLUCOSE SERPL-MCNC: 117 MG/DL (ref 65–140)
HCT VFR BLD AUTO: 27.1 % (ref 36.5–49.3)
HGB BLD-MCNC: 9.3 G/DL (ref 12–17)
IMM GRANULOCYTES # BLD AUTO: 0.01 THOUSAND/UL (ref 0–0.2)
IMM GRANULOCYTES NFR BLD AUTO: 0 % (ref 0–2)
LYMPHOCYTES # BLD AUTO: 1.56 THOUSANDS/ΜL (ref 0.6–4.47)
LYMPHOCYTES NFR BLD AUTO: 30 % (ref 14–44)
MCH RBC QN AUTO: 27 PG (ref 26.8–34.3)
MCHC RBC AUTO-ENTMCNC: 34.3 G/DL (ref 31.4–37.4)
MCV RBC AUTO: 79 FL (ref 82–98)
MONOCYTES # BLD AUTO: 0.66 THOUSAND/ΜL (ref 0.17–1.22)
MONOCYTES NFR BLD AUTO: 13 % (ref 4–12)
NEUTROPHILS # BLD AUTO: 2.65 THOUSANDS/ΜL (ref 1.85–7.62)
NEUTS SEG NFR BLD AUTO: 51 % (ref 43–75)
NRBC BLD AUTO-RTO: 0 /100 WBCS
PHOSPHATE SERPL-MCNC: 4.9 MG/DL (ref 2.7–4.5)
PLATELET # BLD AUTO: 170 THOUSANDS/UL (ref 149–390)
PMV BLD AUTO: 11.4 FL (ref 8.9–12.7)
POTASSIUM SERPL-SCNC: 4.5 MMOL/L (ref 3.5–5.3)
RBC # BLD AUTO: 3.44 MILLION/UL (ref 3.88–5.62)
SODIUM SERPL-SCNC: 139 MMOL/L (ref 135–147)
WBC # BLD AUTO: 5.16 THOUSAND/UL (ref 4.31–10.16)

## 2024-11-06 PROCEDURE — 97535 SELF CARE MNGMENT TRAINING: CPT

## 2024-11-06 PROCEDURE — 97112 NEUROMUSCULAR REEDUCATION: CPT

## 2024-11-06 PROCEDURE — 97129 THER IVNTJ 1ST 15 MIN: CPT

## 2024-11-06 PROCEDURE — 94660 CPAP INITIATION&MGMT: CPT

## 2024-11-06 PROCEDURE — 97116 GAIT TRAINING THERAPY: CPT

## 2024-11-06 PROCEDURE — 97130 THER IVNTJ EA ADDL 15 MIN: CPT

## 2024-11-06 PROCEDURE — 80048 BASIC METABOLIC PNL TOTAL CA: CPT | Performed by: NURSE PRACTITIONER

## 2024-11-06 PROCEDURE — 97110 THERAPEUTIC EXERCISES: CPT

## 2024-11-06 PROCEDURE — 85025 COMPLETE CBC W/AUTO DIFF WBC: CPT | Performed by: NURSE PRACTITIONER

## 2024-11-06 PROCEDURE — 82948 REAGENT STRIP/BLOOD GLUCOSE: CPT

## 2024-11-06 PROCEDURE — 84100 ASSAY OF PHOSPHORUS: CPT | Performed by: NURSE PRACTITIONER

## 2024-11-06 PROCEDURE — 94003 VENT MGMT INPAT SUBQ DAY: CPT

## 2024-11-06 PROCEDURE — 99232 SBSQ HOSP IP/OBS MODERATE 35: CPT | Performed by: INTERNAL MEDICINE

## 2024-11-06 PROCEDURE — 99233 SBSQ HOSP IP/OBS HIGH 50: CPT | Performed by: INTERNAL MEDICINE

## 2024-11-06 PROCEDURE — 97530 THERAPEUTIC ACTIVITIES: CPT

## 2024-11-06 RX ADMIN — OLANZAPINE 10 MG: 2.5 TABLET, FILM COATED ORAL at 21:25

## 2024-11-06 RX ADMIN — LORATADINE 5 MG: 10 TABLET ORAL at 08:40

## 2024-11-06 RX ADMIN — ATORVASTATIN CALCIUM 40 MG: 40 TABLET, FILM COATED ORAL at 17:11

## 2024-11-06 RX ADMIN — HEPARIN SODIUM 7500 UNITS: 5000 INJECTION INTRAVENOUS; SUBCUTANEOUS at 21:25

## 2024-11-06 RX ADMIN — HYDRALAZINE HYDROCHLORIDE 10 MG: 10 TABLET ORAL at 06:08

## 2024-11-06 RX ADMIN — BUPROPION HYDROCHLORIDE 100 MG: 100 TABLET, FILM COATED ORAL at 17:11

## 2024-11-06 RX ADMIN — LABETALOL HYDROCHLORIDE 300 MG: 100 TABLET, FILM COATED ORAL at 21:25

## 2024-11-06 RX ADMIN — VALBENAZINE 40 MG: 40 CAPSULE ORAL at 21:25

## 2024-11-06 RX ADMIN — NIFEDIPINE 90 MG: 30 TABLET, FILM COATED, EXTENDED RELEASE ORAL at 08:39

## 2024-11-06 RX ADMIN — HEPARIN SODIUM 7500 UNITS: 5000 INJECTION INTRAVENOUS; SUBCUTANEOUS at 06:08

## 2024-11-06 RX ADMIN — IRON SUCROSE 200 MG: 20 INJECTION, SOLUTION INTRAVENOUS at 10:18

## 2024-11-06 RX ADMIN — BUPROPION HYDROCHLORIDE 100 MG: 100 TABLET, FILM COATED ORAL at 08:39

## 2024-11-06 RX ADMIN — HEPARIN SODIUM 7500 UNITS: 5000 INJECTION INTRAVENOUS; SUBCUTANEOUS at 14:14

## 2024-11-06 RX ADMIN — FLUTICASONE PROPIONATE 1 SPRAY: 50 SPRAY, METERED NASAL at 08:44

## 2024-11-06 RX ADMIN — PANTOPRAZOLE SODIUM 40 MG: 40 TABLET, DELAYED RELEASE ORAL at 06:08

## 2024-11-06 RX ADMIN — LABETALOL HYDROCHLORIDE 300 MG: 100 TABLET, FILM COATED ORAL at 08:39

## 2024-11-06 RX ADMIN — ASPIRIN 81 MG: 81 TABLET, COATED ORAL at 08:40

## 2024-11-06 RX ADMIN — HYDRALAZINE HYDROCHLORIDE 10 MG: 10 TABLET ORAL at 21:25

## 2024-11-06 RX ADMIN — HYDRALAZINE HYDROCHLORIDE 10 MG: 10 TABLET ORAL at 14:14

## 2024-11-06 NOTE — ASSESSMENT & PLAN NOTE
Patient was evaluated by the rehabilitation team MD and an appropriate candidate for acute inpatient rehabilitation program at this time.  The patient will tolerate 3 hours/day 5 to 7 days/week of intensive physical, occupational and speech therapy in order to obtain goals for community discharge  Due to the patient's functional Compared to their baseline level of function in addition to their ongoing medical needs, the patient would benefit from daily supervision from a rehabilitation physician as well as rehabilitation nursing to implement and adjust the medical as well as functional plan of care in order to meet the patient's goals.  This patient was discussed by the Interdisciplinary Team in weekly case conference today. The care of the patient was extensively discussed with all care providers and an appropriate rehabilitation plan was formulated unique for this patient. Barriers were identified preventing progression of therapy and appropriate interventions were discussed with each discipline. Please see the team note for input from all disciplines regarding barriers, intervention, and discharge planning.  DC: 11/18 homecare PT/OT/SLP/RN/JOYCE

## 2024-11-06 NOTE — ASSESSMENT & PLAN NOTE
Initial ED presentation 10/14/2024 with RUE and RLE extremity weakness and R facial droop. CTH and CTA were initially negative for acute process, LKW was 1 hour prior to presentation.   , cardene drip initiated to stabilized BP, TNK administration after stabilization of HTN. Symptoms worsened s/p TNK but f/u CT again demonstrated no acute process.   24 hr post-TNK CT negative.   MRI completed, demonstrated indicated left corona radiata stroke.   DAPT initiated for 3 weeks with plan for transition to asa monotherapy.   Continue ASA 81mg and Plavix 75mg daily until 11/6 then transition to ASA 81mg daily monotherapy.  Transitioned to monotherapy today.  Cardiology recommends Zio patch after discharge.  Follow up with Neurovascular after discharge.     PT/OT/ST per primary team.     Complaints of worsening RLE weakness on 11/4 after therapies - CTH stable.  Noted to have orthostasis - recommend obtaining higher BP to prevent symptoms.

## 2024-11-06 NOTE — ASSESSMENT & PLAN NOTE
Noted to have orthostatic hypotension during therapy   Apply TEDs/Binder  Fall precautions  Monitor anemia as above   BP medication titration per IM

## 2024-11-06 NOTE — ASSESSMENT & PLAN NOTE
Hemoglobin most recently of 10.4 which is up from 9.3 but has been hovering in the 10-11 range for most of admission  FOBT: neg x3 10/29,11/1,11/4  Started on venofer per nephrology x3  Continue to monitor with biweekly CBC or sooner if clinically indicated

## 2024-11-06 NOTE — ASSESSMENT & PLAN NOTE
Lab Results   Component Value Date    EGFR 17 11/06/2024    EGFR 17 11/04/2024    EGFR 17 11/01/2024    CREATININE 3.65 (H) 11/06/2024    CREATININE 3.66 (H) 11/04/2024    CREATININE 3.53 (H) 11/01/2024     Recent creatinine of 3.63 10/30 Prior baseline around 2.9-3.0  Etiology felt to be related potentially to ATN in the setting of uncontrolled hypertension and complicated by contrast associated nephropathy  Nephrology was consulted and followed and a UA showed microhematuria and proteinuria.  An ultrasound of the kidney/bladder showed wall thickening but no hydronephrosis  Bladder scans negative for retention  Monitor BMP triweekly or sooner if clinically indicated  Demadex has been on hold and had periodically required IV fluids  Nephrology following  Follow with nephrology as an outpatient or sooner on the ARC if any acute changes

## 2024-11-06 NOTE — ASSESSMENT & PLAN NOTE
Patient with uncontrolled hypertension and diabetes presents with right upper and right lower extremity weakness as well as facial droop  A CT of the head as well as a CTA of the head and neck were completed with negative for acute abnormalities for an acute process  TNK had been administered following the correction of his hypertension after being placed on a Cardene drip  After ministration of the TNK developed worsening dysarthria as well as headache and a repeat CT was still negative.  The 24 post TNK CT was also negative  Neurology followed the patient and MRI was completed and was significant for left corona radiata stroke  Placed on dual antiplatelet therapy for 3 weeks with plans for monotherapy with aspirin and statin indefinitely for secondary stroke prophylaxis (10/15-11/5) started on ASA and statin monotherapy   Recommendation for a Zio patch as an outpatient  Follow-up with neurovascular attending in 6 to 8 weeks  Physical, occupational and speech therapy while on the acute rehabilitation unit  Episode of worsening RLE weakness,   CTH 11/4 no acute changes.

## 2024-11-06 NOTE — PROGRESS NOTES
Progress Note - PMR   Name: Tommie Taylor 59 y.o. male I MRN: [id number]  Unit/Bed#: [facility] 261-01 I Date of Admission: 10/26/2024   Date of Service: 11/6/2024 I Hospital Day: 11     Assessment & Plan  CVA (cerebral vascular accident) ([facility])  Patient with uncontrolled hypertension and diabetes presents with right upper and right lower extremity weakness as well as facial droop  A CT of the head as well as a CTA of the head and neck were completed with negative for acute abnormalities for an acute process  TNK had been administered following the correction of his hypertension after being placed on a Cardene drip  After ministration of the TNK developed worsening dysarthria as well as headache and a repeat CT was still negative.  The 24 post TNK CT was also negative  Neurology followed the patient and MRI was completed and was significant for left corona radiata stroke  Placed on dual antiplatelet therapy for 3 weeks with plans for monotherapy with aspirin and statin indefinitely for secondary stroke prophylaxis (10/15-11/5) started on ASA and statin monotherapy   Recommendation for a Zio patch as an outpatient  Follow-up with neurovascular attending in 6 to 8 weeks  Physical, occupational and speech therapy while on the acute rehabilitation unit  Episode of worsening RLE weakness,   CT 11/4 no acute changes.   Bipolar I disorder, severe, current or most recent episode depressed, with psychotic features ([facility])  History of chronic bipolar 1 disorder and follows with outpatient psychiatry and was seen inpatient  Mood has been stable and is maintained on Zyprexa, bupropion and trazodone as well as Ingrezza for tardive dyskinesia  Follow-up with psychiatry as an outpatient and consider virtual consultation if indicated for any changes while on ARC  GERD (gastroesophageal reflux disease)  Continue Protonix as well as as needed Tums  Insomnia  Continue trazodone and consider sleep logs  Encourage use of CPAP   DAISY  (obstructive sleep apnea)  Continue CPAP with home settings  Ordered and compliant per records  Anemia, unspecified  Hemoglobin most recently of 10.4 which is up from 9.3 but has been hovering in the 10-11 range for most of admission  FOBT: neg x3 10/29,11/1,11/4  Started on venofer per nephrology x3  Continue to monitor with biweekly CBC or sooner if clinically indicated  Hyperlipidemia  Continue Lipitor 40 mg every evening  Class 3 severe obesity due to excess calories with serious comorbidity and body mass index (BMI) of 40.0 to 44.9 in adult ([facility])  Continue with exercise and promote lifestyle modification, weight loss counseling and management of medical conditions to optimize status  Tardive dyskinesia  Continue Ingrezza 40 mg at bedtime  Hypertension  Presented to the hospital significantly elevated blood pressure with systolic ranging from 197-231  Was placed on a Cardizem drip initially for hypertensive emergency  Had been on Demadex 20 mg daily but was on hold due to the increasing creatinine  Cw labetalol 300 mg BID , nifedipine 90 mg daily and hydralazine 25mg Q8h- hydral dec to 10 mg Q8h,   Goals for normotension  Mgmt per IM  Follow-up with nephrology as an outpatient  DM2 (diabetes mellitus, type 2) ([facility])  Lab Results   Component Value Date    HGBA1C 8.6 (H) 10/16/2024       Recent Labs     11/05/24  1113 11/05/24  1600 11/05/24  2042 11/06/24  0556   POCGLU 111 100 119 106     Most recent hemoglobin A1c of 8.6 and technically uncontrolled although blood sugars in the hospital have been well-controlled  Currently on semaglutide as an outpatient but was on hold due to ROSINA in the hospital  Continue sliding scale and 4 times daily Accu-Cheks and as well as a diabetic diet    Acute kidney injury superimposed on stage 4 chronic kidney disease ([facility])  Lab Results   Component Value Date    EGFR 17 11/06/2024    EGFR 17 11/04/2024    EGFR 17 11/01/2024    CREATININE 3.65 (H) 11/06/2024    CREATININE 3.66 (H)  11/04/2024    CREATININE 3.53 (H) 11/01/2024     Recent creatinine of 3.63 10/30 Prior baseline around 2.9-3.0  Etiology felt to be related potentially to ATN in the setting of uncontrolled hypertension and complicated by contrast associated nephropathy  Nephrology was consulted and followed and a UA showed microhematuria and proteinuria.  An ultrasound of the kidney/bladder showed wall thickening but no hydronephrosis  Bladder scans negative for retention  Monitor BMP triweekly or sooner if clinically indicated  Demadex has been on hold and had periodically required IV fluids  Nephrology following  Follow with nephrology as an outpatient or sooner on the ARC if any acute changes  Encephalopathy  Had lethargy on exam back on 10/17 in acute care with improvements however more recently had issues with confusion and decreased consciousness in the mornings that improves throughout the day with unclear etiology  Prior history of cognitive impairments with last MoCA score of 18  CT of the head was stable, B12 level (446) wnl  Was evaluated by psychiatry with no changes in medications recommended  EEG showed no epileptiform activity just moderate nonspecific dysfunction  Will need to monitor especially with change in environment now on the ARC for any changes  Impaired mobility and activities of daily living  Patient was evaluated by the rehabilitation team MD and an appropriate candidate for acute inpatient rehabilitation program at this time.  The patient will tolerate 3 hours/day 5 to 7 days/week of intensive physical, occupational and speech therapy in order to obtain goals for community discharge  Due to the patient's functional Compared to their baseline level of function in addition to their ongoing medical needs, the patient would benefit from daily supervision from a rehabilitation physician as well as rehabilitation nursing to implement and adjust the medical as well as functional plan of care in order to meet  the patient's goals.  This patient was discussed by the Interdisciplinary Team in weekly case conference today. The care of the patient was extensively discussed with all care providers and an appropriate rehabilitation plan was formulated unique for this patient. Barriers were identified preventing progression of therapy and appropriate interventions were discussed with each discipline. Please see the team note for input from all disciplines regarding barriers, intervention, and discharge planning.  DC: 11/18 homecare PT/OT/SLP/RN/HHA  At risk for alteration in bowel function  Constipated no bm for several days; lactulose PRN  Start colase, senna daily and miralax prn  Titrate as needed   Overgrown toenails  Cs podiatry   Vitamin D deficiency  <7 10/7  Mgmt per IM/nephrology   Fall during current hospitalization  Patient with fall to knees at shift change 10/31   Denied any knee pain  Get larger bariatric chair   Fall precautions.   Orthostatic hypotension  Noted to have orthostatic hypotension during therapy   Apply TEDs/Binder  Fall precautions  Monitor anemia as above   BP medication titration per IM     History of Present Illness   Tommie Taylor is a 59 y.o. male with history of bipolar disorder, CKD stage IV, type 2 diabetes, hypertension, tardive dyskinesia, sleep apnea, GERD who presented to the [facility] on 10/14/2024 for right-sided weakness and facial droop.  MRI revealed a left corona radiata infarct and neurology was consulted and was administered TNK.  He initially required a Cardene drip for his hypertensive emergency and was eventually placed on dual antiplatelet therapy with plan to continue aspirin and Plavix until that date and continue with aspirin as monotherapy as well as atorvastatin for secondary stroke prophylaxis.  He was recommended for Zio patch placement as an outpatient.  His hospital course was complicated by acute kidney injury on top of his CKD thought  to be related to ATN in the setting of uncontrolled blood pressure as well as contrast associated nephropathy.  Nephrology did follow during his course and had some levels of improvement however has not returned back to his baseline level of creatinine.  Additionally there was some levels of encephalopathy which are stable at this time. The patient was evaluated by the Rehabilitation team and deemed an appropriate candidate for comprehensive inpatient rehabilitation and admitted to the [facility] on 10/26/2024  5:13 PM     Rehab Diagnosis: Impairment of mobility, safety, Activities of Daily Living (ADLs), and cognitive/communication skills due to Stroke:  01.2  Right Body Involvement (Left Brain)  Etiologic Dx: Left Corona Radiata Infarct  Date of Onset: 10/14/2024   Date of surgery: N/A  Chief Complaint: f/u stroke    Interval: Patient seen and examined in chair. No events overnight.  Reports overall feeling well. Last BM 11/6. Sleeping well at night. Denies any f/c/n/v, CP, SOB, abdominal pain, constipation, or diarrhea.       Objective   Functional Update:  Physical Therapy Occupational Therapy Speech Therapy   Weight Bearing Status: Full Weight Bearing  Transfers: Minimal Assistance  Bed Mobility: Incidental Touching  Amulation Distance (ft): 150 feet  Ambulation: Moderate Assistance, Incidental Touching, Minimal Assistance (Pt progressing but fluctuates, also BP has been dropping)  Assistive Device for Ambulation:  (no device)  Wheelchair Mobility Distance: 50 ft  Wheelchair Mobility: Minimal Assistance  Number of Stairs: 12  Assistive Device for Stairs: Lehft Hand Rail  Stair Assistance: Minimal Assistance  Ramp: Minimal Assistance  Discharge Recommendations: Home with:  DC Home with:: Family Support, 24 Hour Supervision   Eating: Supervision (set-up)  Grooming: Supervision  Bathing: Moderate Assistance  Bathing: Moderate Assistance  Upper Body Dressing: Minimal Assistance  Lower Body Dressing: Minimal Assistance  (footwear mod A)  Toileting: Moderate Assistance (intermittent incontinence of urine, having BM infrequently whihc he reports is baseline)  Tub/Shower Transfer: Minimal Assistance  Toilet Transfer: Minimal Assistance  Cognition: Exceptions to WNL  Cognition: Decreased Memory, Decreased Executive Functions, Decreased Attention, Decreased Safety, Decreased Comprehension  Orientation: Person, Place, Time, Situation   Mode of Communication: Verbal  Speech/Language: Dysarthia  Cognition: Exceptions to WNL  Cognition: Decreased Memory, Decreased Executive Functions, Decreased Attention, Decreased Comprehension, Decreased Safety  Orientation: Person, Place, Time, Situation  Discharge Recommendations: Home with:  DC Home with:: 24 Hour Supervision, 24 Hour Assisteance, Family Support, Home Speech Therapy, Outpatient Speech Therapy         Temp:  [96.9 °F (36.1 °C)-98.2 °F (36.8 °C)] 97.4 °F (36.3 °C)  HR:  [70-79] 79  Resp:  [16-18] 16  BP: ()/(51-90) 139/74  SpO2:  [97 %-100 %] 97 %    Physical Exam    Gen: No acute distress, obese  HEENT: Moist mucus membranes, Normocephalic/Atraumatic  Cardiovascular: Regular rate, rhythm,  Heme/Extr: bilateral LE edema   Pulmonary: Non-labored breathing. Lungs CTAB  : No amezquita  GI:  non-distended. BS+  MSK: ROM is WFL in all extremities.   Integumentary: Skin is warm, dry. .  Neuro: dysarthric, RUE/RLE weakness, inc tone in elbow flexion   Psych: Normal mood and affect.       Scheduled Meds:  Current Facility-Administered Medications   Medication Dose Route Frequency Provider Last Rate    acetaminophen  650 mg Oral Q6H PRN Alex S Ariel, DO      aspirin  81 mg Oral Daily Alex S Ariel, DO      atorvastatin  40 mg Oral QPM Alex S Ariel, DO      buPROPion  100 mg Oral BID Alex S Ariel, DO      calcium carbonate  1,000 mg Oral BID PRN Alex S Ariel, DO      docusate sodium  100 mg Oral BID Cathy Miranda MD      fluticasone  1 spray Each Nare Daily SAMANTHA Kern       heparin (porcine)  7,500 Units Subcutaneous Q8H MIGEL Alex S Ariel, DO      hydrALAZINE  10 mg Oral Q8H MIGEL SAMANTHA Kern      iron sucrose  200 mg Intravenous Daily SAMANTHA Kern Stopped (11/05/24 1715)    labetalol  300 mg Oral BID Alex S Ariel, DO      lactulose  20 g Oral TID PRN Cathy Miranda MD      loratadine  5 mg Oral Daily SAMANTHA Kern      NIFEdipine  90 mg Oral Daily Alex S Ariel, DO      OLANZapine  10 mg Oral HS Alex S George, DO      ondansetron  4 mg Oral Q6H PRN SAMANTHA Kern      pantoprazole  40 mg Oral Early Morning Alex S Ariel, DO      polyethylene glycol  17 g Oral Daily Cathy Miranda MD      senna  2 tablet Oral HS Cathy Miranda MD      traZODone  50 mg Oral HS PRN Alex S George, DO      Valbenazine Tosylate  40 mg Oral HS Alex S Ariel, DO           Lab Results: I have reviewed the following results:  Results from last 7 days   Lab Units 11/06/24  0447 11/04/24  0504   HEMOGLOBIN g/dL 9.3* 9.1*   HEMATOCRIT % 27.1* 28.3*   WBC Thousand/uL 5.16 6.41   PLATELETS Thousands/uL 170 167     Results from last 7 days   Lab Units 11/06/24  0447 11/04/24  0504 11/01/24  0546   BUN mg/dL 31* 29* 27*   SODIUM mmol/L 139 138 138   POTASSIUM mmol/L 4.5 4.8 4.7   CHLORIDE mmol/L 108 108 109*   CREATININE mg/dL 3.65* 3.66* 3.53*              Cathy Miranda MD   Physical Medicine and Rehabilitation   11/06/24    I have spent a total time of 46 minutes in caring for this patient on the day of the visit/encounter including Counseling / Coordination of care, Documenting in the medical record, and Communicating with other healthcare professionals .

## 2024-11-06 NOTE — ASSESSMENT & PLAN NOTE
Presented with hypertensive emergency on initial hospitalization.   Concerns about medication non-compliance.  Home regimen: labetalol 100mg BID and amlodipine 10mg daily.  Currently on labetalol 300mg BID, Nifedipine 90mg daily, and hydralazine 10mg Q8 hours.  Hydralazine decreased yesterday due to orthostasis.   Nephrology following.

## 2024-11-06 NOTE — PROGRESS NOTES
NEPHROLOGY HOSPITAL PROGRESS NOTE   Tommie Taylor 59 y.o. male MRN: [id number]  Unit/Bed#: -01 Encounter: [id number]  Reason for Consult: ROSINA    Assessment & Plan  Acute kidney injury superimposed on stage 4 chronic kidney disease (HCC)    ROSINA thought to be secondary to IAN 10/14/2024 + ischemic injury secondary to severe hemodynamic perturbations plus some component of prerenal azotemia with subsequent ATN.  Creatinine appears to have plateaued at approximately 3.5-3.6.  Current EGFR of 17.  Likely represents new baseline given previous advanced CKD with previous baseline creatinine near 3.0.    Noted nephrotic range proteinuria underlying disease most likely secondary to diabetic kidney disease.  CVA (cerebral vascular accident) ([facility])    Status post TNK administration  Management as per primary service and neurology.  Bipolar I disorder, severe, current or most recent episode depressed, with psychotic features ([facility])  Follow-up with psych  Currently on Zyprexa and Wellbutrin  Anemia, unspecified  Recent hemoglobin 9.3.  Stool for occult blood negative  SPEP negative for monoclonal gammopathy  Iron saturation 38% with a ferritin of 111.  Continue with Venofer.  KOBI relatively contraindicated given recent CVA.    Hypertension  Initially with hypertensive emergency  Currently on labetalol 300 mg p.o. twice daily, nifedipine 90 mg p.o. daily, hydralazine 25 mg p.o. every 8  Continue to hold maintenance loop diuretic therapy.  Hyperlipidemia  Continue on atorvastatin  Goal LDL less than 70  Orthostatic hypotension  Mild orthostasis noted.  No changes in his current regimen.    Summary:  Renal function overall stable with a creatinine 3.65 again representing new baseline  Hemoglobin stable at 9.3, continue with IV iron  Blood pressure acceptable no changes in his current antihypertensive regimen  Volume status appears stable, holding maintenance loop diuretic therapy    SUBJECTIVE / 24H INTERVAL HISTORY:  Seen  and examined.  Patient offers no new complaints.  Resting comfortably.  Does not appear short of breath.  No active chest pain or pressure.    OBJECTIVE:  Current Weight: Weight - Scale: 120 kg (264 lb 12.8 oz)  Vitals:    11/05/24 1950 11/05/24 2115 11/06/24 0440 11/06/24 0837   BP: 153/90  143/77 139/74   BP Location: Left arm  Left arm Left arm   Pulse: 74  73 79   Resp: 18  18 16   Temp: 98.2 °F (36.8 °C)  (!) 97.4 °F (36.3 °C)    TempSrc: Tympanic  Tympanic    SpO2: 99% 99% 100% 97%   Weight:       Height:           Intake/Output Summary (Last 24 hours) at 11/6/2024 1147  Last data filed at 11/6/2024 0801  Gross per 24 hour   Intake 1000 ml   Output 1350 ml   Net -350 ml       Physical Exam  Constitutional:       Appearance: He is obese. He is not ill-appearing.   Eyes:      General: No scleral icterus.  Cardiovascular:      Rate and Rhythm: Normal rate and regular rhythm.   Pulmonary:      Effort: Pulmonary effort is normal.      Breath sounds: Normal breath sounds.   Abdominal:      General: There is no distension.      Palpations: Abdomen is soft.      Tenderness: There is no abdominal tenderness.   Musculoskeletal:      Right lower leg: No edema.      Left lower leg: No edema.   Skin:     General: Skin is warm and dry.      Findings: No rash.   Neurological:      Mental Status: He is alert and oriented to person, place, and time.         Medications:    Current Facility-Administered Medications:     acetaminophen (TYLENOL) tablet 650 mg, 650 mg, Oral, Q6H PRN, Alex S Ariel, DO, 650 mg at 10/26/24 1842    aspirin (ECOTRIN LOW STRENGTH) EC tablet 81 mg, 81 mg, Oral, Daily, Alex S George, DO, 81 mg at 11/06/24 0840    atorvastatin (LIPITOR) tablet 40 mg, 40 mg, Oral, QPM, Alex S Ariel, DO, 40 mg at 11/05/24 1713    buPROPion (WELLBUTRIN) tablet 100 mg, 100 mg, Oral, BID, Alex S Ariel, DO, 100 mg at 11/06/24 0839    calcium carbonate (TUMS) chewable tablet 1,000 mg, 1,000 mg, Oral, BID PRN, Alex George,  DO    docusate sodium (COLACE) capsule 100 mg, 100 mg, Oral, BID, Cathy Miranda MD, 100 mg at 11/05/24 1713    fluticasone (FLONASE) 50 mcg/act nasal spray 1 spray, 1 spray, Each Nare, Daily, SAMANTHA Kern, 1 spray at 11/06/24 0844    heparin (porcine) subcutaneous injection 7,500 Units, 7,500 Units, Subcutaneous, Q8H MIGEL, Alex George DO, 7,500 Units at 11/06/24 0608    hydrALAZINE (APRESOLINE) tablet 10 mg, 10 mg, Oral, Q8H MIGEL, SAMANTHA Kern, 10 mg at 11/06/24 0608    iron sucrose (VENOFER) 200 mg in sodium chloride 0.9 % 100 mL IVPB, 200 mg, Intravenous, Daily, SAMANTHA Kern, Last Rate: 100 mL/hr at 11/06/24 1018, 200 mg at 11/06/24 1018    labetalol (NORMODYNE) tablet 300 mg, 300 mg, Oral, BID, Alex George DO, 300 mg at 11/06/24 0839    lactulose (CHRONULAC) oral solution 20 g, 20 g, Oral, TID PRN, Cathy Miranda MD, 20 g at 11/04/24 1146    loratadine (CLARITIN) tablet 5 mg, 5 mg, Oral, Daily, SAMANTHA Kern, 5 mg at 11/06/24 0840    NIFEdipine (PROCARDIA XL) 24 hr tablet 90 mg, 90 mg, Oral, Daily, Alex George DO, 90 mg at 11/06/24 0839    OLANZapine (ZyPREXA) tablet 10 mg, 10 mg, Oral, HS, Alex George DO, 10 mg at 11/05/24 2109    ondansetron (ZOFRAN-ODT) dispersible tablet 4 mg, 4 mg, Oral, Q6H PRN, SAMANTHA Kern    pantoprazole (PROTONIX) EC tablet 40 mg, 40 mg, Oral, Early Morning, Alex George DO, 40 mg at 11/06/24 0608    polyethylene glycol (MIRALAX) packet 17 g, 17 g, Oral, Daily, Cathy Miranda MD, 17 g at 11/01/24 0951    senna (SENOKOT) tablet 17.2 mg, 2 tablet, Oral, HS, Cathy Miranda MD, 17.2 mg at 11/05/24 2110    traZODone (DESYREL) tablet 50 mg, 50 mg, Oral, HS PRN, Alex George DO, 50 mg at 11/04/24 0019    Valbenazine Tosylate CAPS 40 mg, 40 mg, Oral, HS, Alex George DO, 40 mg at 11/05/24 2110    Laboratory Results:  Results from last 7 days   Lab Units 11/06/24  0447 11/04/24  0504 11/01/24  0546   WBC  "Thousand/uL 5.16 6.41  --    HEMOGLOBIN g/dL 9.3* 9.1*  --    HEMATOCRIT % 27.1* 28.3*  --    PLATELETS Thousands/uL 170 167  --    POTASSIUM mmol/L 4.5 4.8 4.7   CHLORIDE mmol/L 108 108 109*   CO2 mmol/L 22 24 24   BUN mg/dL 31* 29* 27*   CREATININE mg/dL 3.65* 3.66* 3.53*   CALCIUM mg/dL 8.5 8.4 8.6   MAGNESIUM mg/dL  --  1.5*  --    PHOSPHORUS mg/dL 4.9* 4.0 4.3       Portions of the record may have been created with voice recognition software. Occasional wrong word or \"sound a like\" substitutions may have occurred due to the inherent limitations of voice recognition software. Read the chart carefully and recognize, using context, where substitutions have occurred.If you have any questions, please contact the dictating provider.  "

## 2024-11-06 NOTE — ASSESSMENT & PLAN NOTE
Lab Results   Component Value Date    HGBA1C 8.6 (H) 10/16/2024       Recent Labs     11/05/24  1113 11/05/24  1600 11/05/24 2042 11/06/24  0556   POCGLU 111 100 119 106       Blood Sugar Average: Last 72 hrs:  (P) [id number]    Had not been taking anything at home.  Ran out of Ozempic a few months ago.  BG has been well controlled.  Stop SSI and switch to BID accuchecks.  Continue cons. carb diet.

## 2024-11-06 NOTE — PROGRESS NOTES
"   11/06/24 0830   Pain Assessment   Pain Assessment Tool 0-10   Pain Score No Pain   Patient's Stated Pain Goal No pain   Restrictions/Precautions   Precautions Bed/chair alarms;Cognitive;Fall Risk;Supervision on toilet/commode   Weight Bearing Restrictions No   ROM Restrictions No   Braces or Orthoses (S)  Other (Comment)  (Aleksandr stockings BLEs)   Lifestyle   Autonomy \"They keep giving me stuff to go, and then they couldn't get to me fast enough and I just exploded.\" regarding bowel incotninence this morning.   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   Intrinsic Gratification Double Encore   Oral Hygiene   Type of Assistance Needed Set-up / clean-up   Physical Assistance Level No physical assistance   Comment seated due to fatigue after shower and decline in BP in stance   Oral Hygiene CARE Score 5   Shower/Bathe Self   Type of Assistance Needed Physical assistance   Physical Assistance Level 26%-50%   Comment Assist for buttocks, left upper arm   Shower/Bathe Self CARE Score 3   Bathing   Assessed Bath Style Shower   Anticipated D/C Bath Style Shower   Able to Gather/Transport No   Able to Adjust Water Temperature Yes   Able to Wash/Rinse/Dry (body part) Right Arm;L Upper Leg;R Upper Leg;L Lower Leg/Foot;R Lower Leg/Foot;Chest;Abdomen;Perineal Area   Limitations Noted in Endurance;Balance;Strength;ROM   Positioning Seated;Standing   Upper Body Dressing   Type of Assistance Needed Physical assistance   Physical Assistance Level 25% or less   Comment assist to set-up shirt and place right hand   Upper Body Dressing CARE Score 3   Lower Body Dressing   Type of Assistance Needed Physical assistance   Physical Assistance Level 25% or less   Comment CG/min A introduction of reacher for increased ease of threading   Lower Body Dressing CARE Score 3   Putting On/Taking Off Footwear   Type of Assistance Needed Physical assistance   Physical Assistance Level 76% or more   Comment attempted " reddy today with training and problem solving, however not successful. Medical team has also initiated JIM stockings today which were TA to don.   Putting On/Taking Off Footwear CARE Score 2   Sit to Stand   Type of Assistance Needed Incidental touching   Physical Assistance Level No physical assistance   Comment CGA without AD   Sit to Stand CARE Score 4   Bed-Chair Transfer   Type of Assistance Needed Physical assistance   Physical Assistance Level 25% or less   Comment CG/Min A with gait belt to ambulate to shower chair   Chair/Bed-to-Chair Transfer CARE Score 3   Toileting Hygiene   Type of Assistance Needed Physical assistance   Physical Assistance Level 76% or more   Comment Pt wearing a disposable brief due to incontinence which he is dependent for donning, pants CM is CGA, posterior hygiene is max A   Toileting Hygiene CARE Score 2   Toilet Transfer   Type of Assistance Needed Physical assistance   Physical Assistance Level 25% or less   Comment CG/Min A with gait belt   Toilet Transfer CARE Score 3   Cognition   Overall Cognitive Status Impaired   Arousal/Participation Alert;Cooperative;Lethargic  (end of session Pt having a lot of difficulty mntning arousal, falling asleep during conversation., needing tactile stim to arouse)   Attention Attends with cues to redirect   Orientation Level Oriented X4   Memory Decreased short term memory;Decreased recall of precautions;Decreased recall of recent events   Following Commands Follows one step commands with increased time or repetition   Additional Activities   Additional Activities Comments OT and Pt called Pt's girlfriend to discuss CLOF, expectations for Pt's return home, assistance available, and scheduling FT. see below for details.   Activity Tolerance   Activity Tolerance Patient limited by fatigue   Medical Staff Made Aware BPs during session: seated 134/74, seated after showering 155/78, standing static 119/64, seated after dressing 159/89. Estella  "applied for LE edema and orthostatic BPs.   Assessment   Treatment Assessment (S)  Pt engaged in 100min OT session focused on selfcare performance from varying surfaces to determine most appropriate set-up locations to faciltiate optimal independence and safety. From commode Pt not able to lean forward to thread pants without potentila of tipping the commode, from tilt in space WC Pt too high off the floor to successfully use the sockaide. Optimal position is seated at EOB, with side prop for foot wear. Pt able to bathe in the shower except left upper arm and buttocks. Following ADL Pt and OT called his wife (common-law) to discuss expectations for performance at discharge. The plan if for Pt to have a bed in the living room with BSC like his wife currently has her set-up. The niece will be in charge of emptying the commode. His wife Marie reports being able to assist him in the shower for left UE and buttocks. It is a walk-in shower with 4\"lip and grab bars available, however shower seat will not accomodate his weight per Marie. Marie will also assist Pt with don/doffing shoes and socks. The plan is to purchase easy velcro sneakers once Pt is home and able to try them on. During the day while Marie is at dialysis, Pt would be home alone with Marie's father who can not physically assist Pt, but could bring him food/drink etc. Niece lives in the home but is at school during the day. Pt used to go with Marie to dialysis at wait in the car, however now would need assistance to get into the building to utilize a BR as needed during that time, therefore recommending he stay at home. OT explained that he will not longer be able to assist with getting the power scooter in/out of the car for Marie. Marie was agreeable to come to the [facility] on Friday 11/8 from 11-2 for family training and further discussion of discharge planning.   Prognosis Good   Problem List Decreased strength;Decreased endurance;Impaired balance;Decreased " mobility;Decreased coordination;Impaired judgement;Decreased safety awareness;Obesity   Barriers to Discharge Inaccessible home environment;Decreased caregiver support   Plan   Treatment/Interventions ADL retraining;Functional transfer training;Therapeutic exercise;Endurance training;Patient/family training;Compensatory technique education  (estim, NMR)   OT Therapy Minutes   OT Time In 0830   OT Time Out 1010   OT Total Time (minutes) 100   OT Mode of treatment - Individual (minutes) 100   OT Mode of treatment - Concurrent (minutes) 0   OT Mode of treatment - Group (minutes) 0   OT Mode of treatment - Co-treat (minutes) 0   OT Mode of Treatment - Total time(minutes) 100 minutes   OT Cumulative Minutes 685   Therapy Time missed   Time missed? No

## 2024-11-06 NOTE — ASSESSMENT & PLAN NOTE
Recent hemoglobin 9.3.  Stool for occult blood negative  SPEP negative for monoclonal gammopathy  Iron saturation 38% with a ferritin of 111.  Continue with Venofer.  KOBI relatively contraindicated given recent CVA.

## 2024-11-06 NOTE — PROGRESS NOTES
Progress Note - Internal Medicine   Name: Tommie Taylor 59 y.o. male I MRN: [id number]  Unit/Bed#: -01 I Date of Admission: 10/26/2024   Date of Service: 11/6/2024 I Hospital Day: 11    Assessment & Plan  CVA (cerebral vascular accident) ([facility])  Initial ED presentation 10/14/2024 with RUE and RLE extremity weakness and R facial droop. CTH and CTA were initially negative for acute process, LKW was 1 hour prior to presentation.   , cardene drip initiated to stabilized BP, TNK administration after stabilization of HTN. Symptoms worsened s/p TNK but f/u CT again demonstrated no acute process.   24 hr post-TNK CT negative.   MRI completed, demonstrated indicated left corona radiata stroke.   DAPT initiated for 3 weeks with plan for transition to asa monotherapy.   Continue ASA 81mg and Plavix 75mg daily until 11/6 then transition to ASA 81mg daily monotherapy.  Transitioned to monotherapy today.  Cardiology recommends Zio patch after discharge.  Follow up with Neurovascular after discharge.     PT/OT/ST per primary team.     Complaints of worsening RLE weakness on 11/4 after therapies - CTH stable.  Noted to have orthostasis - recommend obtaining higher BP to prevent symptoms.  Bipolar I disorder, severe, current or most recent episode depressed, with psychotic features ([facility])  Currently on bupropion 100mg BID and olanzapine 10mg at HS with valbenazine tosylate 40mg for tardive dyskinesia per home regimen. Follow up outpatient with psychiatry.   GERD (gastroesophageal reflux disease)  EGD with GI 10/14, directed to continue with Protonix. Tums available PRN for dyspepsia.   Insomnia  Educate and encourage on sleep hygiene. Continue Trazodone at HS for sleep.   DAISY (obstructive sleep apnea)  Continue use of CPAP with home settings.  Does not use CPAP at home - currently broken.  Recommend overnight oximetry prior to discharge.   Anemia, unspecified  Hgb currently 9.3.  Hemoglobin 12.1 on admission.   Most  recent iron panel on 10/15: Iron Sat 38%, TIBC 204, Iron 77, and Ferritin 111.  No need for iron supplementation at this time.  FOBT 2 out of 3 negative.  3rd to be obtained.  Started on IV venofer x3 days per Nephrology recs.  Continue to trend routine CBC.  Hyperlipidemia  Most recent lipid panel 10/16/24: Cholesterol 154, triglycerides 120, HDL 40, LDL 90.  Continue Lipitor 40mg daily, increased from home 20mg daily.  Class 3 severe obesity due to excess calories with serious comorbidity and body mass index (BMI) of 40.0 to 44.9 in adult ([facility])  BMI 42.74 on admission. Encourage lifestyle modifications and provide support for nutritional teaching. Consult placed to nutrition services during acute course.   Tardive dyskinesia  Continue on home treatment Valbenazine Tosylate 40mg HS.   Hypertension  Presented with hypertensive emergency on initial hospitalization.   Concerns about medication non-compliance.  Home regimen: labetalol 100mg BID and amlodipine 10mg daily.  Currently on labetalol 300mg BID, Nifedipine 90mg daily, and hydralazine 10mg Q8 hours.  Hydralazine decreased yesterday due to orthostasis.   Nephrology following.   DM2 (diabetes mellitus, type 2) ([facility])  Lab Results   Component Value Date    HGBA1C 8.6 (H) 10/16/2024       Recent Labs     11/05/24  1113 11/05/24  1600 11/05/24  2042 11/06/24  0556   POCGLU 111 100 119 106       Blood Sugar Average: Last 72 hrs:  (P) 120.5498576836162985    Had not been taking anything at home.  Ran out of Ozempic a few months ago.  BG has been well controlled.  Stop SSI and switch to BID accuchecks.  Continue cons. carb diet.    Acute kidney injury superimposed on stage 4 chronic kidney disease ([facility])  Lab Results   Component Value Date    EGFR 17 11/06/2024    EGFR 17 11/04/2024    EGFR 17 11/01/2024    CREATININE 3.65 (H) 11/06/2024    CREATININE 3.66 (H) 11/04/2024    CREATININE 3.53 (H) 11/01/2024     Creatinine currently 3.65.  Baseline creatinine 2.5-2.9.     Avoid nephrotoxins.  Home diuretics currently on hold.  Encourage hydration.  Renal ultrasound demonstrates no hydro but mild bladder wall thickening.   Nephrology following.  Labs MWF per Nephro.  Follows with Dr. No (Nephrology) as outpatient.  Encephalopathy  Lethargy and encephalopathic symptoms noted during acute course.  B12 and head CT WNL. No subsequent episodes noted.   EEG on 10/22 consistent with moderate nonspecific cerebral dysfunction, no seizure activity.  Continue to monitor behavior and symptoms for signs of changes.   Most recent MoCA was 30 in 2024.  Per Psych - if continues to have delirium consider discontinuing Wellbutrin.  Concerns DAISY could also be contributing to encephalopathy per acute care.  Vitamin D deficiency  Vitamin D level <7 on 10/7.  Continue home vitamin D 50,000u weekly.  Continue to follow-up with Nephrology as outpatient.  Orthostatic hypotension  Orthostatic + on .  Hydralazine decreased.  Started on IV Venofer.  Continue to monitor orthostatics.  Apply abdominal binder/TEDs as needed.  Encourage PO hydration.    VTE Pharmacologic Prophylaxis:   Pharmacologic: Heparin  Mechanical VTE Prophylaxis in Place: Yes - sequential compression devices.    Current Length of Stay: 11 day(s)    Current Patient Status: Inpatient Rehab     Discharge Plan: As per primary team.    Code Status: Level 1 - Full Code    Subjective:   Pt examined while pt sitting in WC in pt room.  Currently complaining of having too many BMs.  Denies any abdominal cramping or nausea.  Received lactulose yesterday and this morning.  Will discontinue order at this time.  Denies any headaches, lightheadedness, dizziness, SOB, palpitations, or CP.  Has no other concerns or complaints at this time.    Objective:     Vitals:   Temp (24hrs), Av.5 °F (36.4 °C), Min:96.9 °F (36.1 °C), Max:98.2 °F (36.8 °C)    Temp:  [96.9 °F (36.1 °C)-98.2 °F (36.8 °C)] 97.4 °F (36.3 °C)  HR:  [70-79] 79  Resp:  [16-18]  16  BP: ()/(51-90) 139/74  SpO2:  [97 %-100 %] 97 %  Body mass index is 42.74 kg/m².     Review of Systems   Constitutional:  Negative for appetite change, chills, fatigue and fever.   HENT:  Negative for trouble swallowing.    Eyes:  Negative for visual disturbance.   Respiratory:  Negative for cough, shortness of breath, wheezing and stridor.    Cardiovascular:  Negative for chest pain, palpitations and leg swelling.   Gastrointestinal:  Positive for diarrhea. Negative for abdominal distention, abdominal pain, constipation, nausea and vomiting.        LBM 11/6   Genitourinary:  Negative for difficulty urinating.   Musculoskeletal:  Positive for gait problem. Negative for arthralgias and back pain.   Neurological:  Positive for weakness (R sided weakness since stroke). Negative for dizziness, light-headedness, numbness and headaches.   Psychiatric/Behavioral:  Negative for dysphoric mood and sleep disturbance. The patient is not nervous/anxious.    All other systems reviewed and are negative.       Input and Output Summary (last 24 hours):       Intake/Output Summary (Last 24 hours) at 11/6/2024 0908  Last data filed at 11/6/2024 0801  Gross per 24 hour   Intake 1000 ml   Output 1350 ml   Net -350 ml       Physical Exam:     Physical Exam  Vitals and nursing note reviewed.   Constitutional:       General: He is not in acute distress.     Appearance: Normal appearance. He is obese. He is not ill-appearing.   HENT:      Head: Normocephalic and atraumatic.   Cardiovascular:      Rate and Rhythm: Normal rate and regular rhythm.      Pulses: Normal pulses.      Heart sounds: Normal heart sounds. No murmur heard.     No friction rub.   Pulmonary:      Effort: Pulmonary effort is normal. No respiratory distress.      Breath sounds: Normal breath sounds. No wheezing or rhonchi.   Abdominal:      General: Abdomen is flat. Bowel sounds are normal. There is no distension.      Palpations: Abdomen is soft. There is no  mass.      Tenderness: There is no abdominal tenderness. There is no guarding or rebound.      Hernia: No hernia is present.   Musculoskeletal:      Cervical back: Normal range of motion and neck supple. No tenderness.      Right lower leg: Edema (Minimal non-pitting edema) present.      Left lower leg: Edema (Minimal non-pitting edema) present.   Skin:     General: Skin is warm and dry.      Capillary Refill: Capillary refill takes less than 2 seconds.   Neurological:      Mental Status: He is alert and oriented to person, place, and time.   Psychiatric:         Mood and Affect: Mood normal.         Behavior: Behavior normal.         Additional Data:     Labs:    Results from last 7 days   Lab Units 11/06/24  0447   WBC Thousand/uL 5.16   HEMOGLOBIN g/dL 9.3*   HEMATOCRIT % 27.1*   PLATELETS Thousands/uL 170   SEGS PCT % 51   LYMPHO PCT % 30   MONO PCT % 13*   EOS PCT % 5     Results from last 7 days   Lab Units 11/06/24  0447   SODIUM mmol/L 139   POTASSIUM mmol/L 4.5   CHLORIDE mmol/L 108   CO2 mmol/L 22   BUN mg/dL 31*   CREATININE mg/dL 3.65*   ANION GAP mmol/L 9   CALCIUM mg/dL 8.5   GLUCOSE RANDOM mg/dL 110         Results from last 7 days   Lab Units 11/06/24  0556 11/05/24  2042 11/05/24  1600 11/05/24  1113 11/05/24  0552 11/04/24  2100 11/04/24  1615 11/04/24  1107 11/04/24  0557 11/03/24  2034 11/03/24  1604 11/03/24  1145   POC GLUCOSE mg/dl 106 119 100 111 88 106 104 169* 120 143* 158* 137               Labs reviewed    Imaging:    Imaging reviewed    Recent Cultures (last 7 days):           Last 24 Hours Medication List:   Current Facility-Administered Medications   Medication Dose Route Frequency Provider Last Rate    acetaminophen  650 mg Oral Q6H PRN Alex S George, DO      aspirin  81 mg Oral Daily Alex S George, DO      atorvastatin  40 mg Oral QPM Alex S George, DO      buPROPion  100 mg Oral BID Alex S George, DO      calcium carbonate  1,000 mg Oral BID PRN Alex S George, DO      docusate  sodium  100 mg Oral BID Cathy Miranda MD      fluticasone  1 spray Each Nare Daily SAMANTHA Kern      heparin (porcine)  7,500 Units Subcutaneous Q8H MIGEL Alex George, DO      hydrALAZINE  10 mg Oral Q8H [facility] SAMANTHA Kern      iron sucrose  200 mg Intravenous Daily SAMANTHA Kern Stopped (11/05/24 1715)    labetalol  300 mg Oral BID Alex George, DO      lactulose  20 g Oral TID PRN Cathy Miranda MD      loratadine  5 mg Oral Daily SAMANTHA Kern      NIFEdipine  90 mg Oral Daily Alex George, DO      OLANZapine  10 mg Oral HS Alex George, DO      ondansetron  4 mg Oral Q6H PRN SAMANTHA Kern      pantoprazole  40 mg Oral Early Morning Alexrobert George, DO      polyethylene glycol  17 g Oral Daily Cathy Miranda MD      senna  2 tablet Oral HS Cathy Miranda MD      traZODone  50 mg Oral HS PRN Alex George, DO      Valbenazine Tosylate  40 mg Oral HS Alexrobert George, DO          M*Modal software was used to dictate this note.  It may contain errors with dictating incorrect words or incorrect spelling. Please contact the provider directly with any questions.

## 2024-11-06 NOTE — ASSESSMENT & PLAN NOTE
Orthostatic + on 11/5.  Hydralazine decreased.  Started on IV Venofer.  Continue to monitor orthostatics.  Apply abdominal binder/TEDs as needed.  Encourage PO hydration.

## 2024-11-06 NOTE — PLAN OF CARE
Problem: PAIN - ADULT  Goal: Verbalizes/displays adequate comfort level or baseline comfort level  Description: Interventions:  - Encourage patient to monitor pain and request assistance  - Assess pain using appropriate pain scale  - Administer analgesics based on type and severity of pain and evaluate response  - Implement non-pharmacological measures as appropriate and evaluate response  - Consider cultural and social influences on pain and pain management  - Notify physician/advanced practitioner if interventions unsuccessful or patient reports new pain  Outcome: Progressing     Problem: INFECTION - ADULT  Goal: Absence or prevention of progression during hospitalization  Description: INTERVENTIONS:  - Assess and monitor for signs and symptoms of infection  - Monitor lab/diagnostic results  - Monitor all insertion sites, i.e. indwelling lines, tubes, and drains  - Monitor endotracheal if appropriate and nasal secretions for changes in amount and color  - [place] appropriate cooling/warming therapies per order  - Administer medications as ordered  - Instruct and encourage patient and family to use good hand hygiene technique  - Identify and instruct in appropriate isolation precautions for identified infection/condition  Outcome: Progressing  Goal: Absence of fever/infection during neutropenic period  Description: INTERVENTIONS:  - Monitor WBC    Outcome: Progressing     Problem: SAFETY ADULT  Goal: Patient will remain free of falls  Description: INTERVENTIONS:  - Educate patient/family on patient safety including physical limitations  - Instruct patient to call for assistance with activity   - Consult OT/PT to assist with strengthening/mobility   - Keep Call bell within reach  - Keep bed low and locked with side rails adjusted as appropriate  - Keep care items and personal belongings within reach  - Initiate and maintain comfort rounds  - Make Fall Risk Sign visible to staff  - Apply yellow socks and bracelet  for high fall risk patients  - Consider moving patient to room near nurses station  Outcome: Progressing  Goal: Maintains/Returns to pre admission functional level  Description: INTERVENTIONS:  - Perform AM-PAC 6 Click Basic Mobility/ Daily Activity assessment daily.  - Set and communicate daily mobility goal to care team and patient/family/caregiver.   - Collaborate with rehabilitation services on mobility goals if consulted  - Out of bed for toileting  - Record patient progress and toleration of activity level   Outcome: Progressing     Problem: NEUROSENSORY - ADULT  Goal: Achieves stable or improved neurological status  Description: INTERVENTIONS  - Monitor and report changes in neurological status  - Monitor vital signs such as temperature, blood pressure, glucose, and any other labs ordered   - Initiate measures to prevent increased intracranial pressure  - Monitor for seizure activity and implement precautions if appropriate      Outcome: Progressing  Goal: Remains free of injury related to seizures activity  Description: INTERVENTIONS  - Maintain airway, patient safety  and administer oxygen as ordered  - Monitor patient for seizure activity, document and report duration and description of seizure to physician/advanced practitioner  - If seizure occurs,  ensure patient safety during seizure  - Reorient patient post seizure  - Seizure pads on all 4 side rails  - Instruct patient/family to notify RN of any seizure activity including if an aura is experienced  - Instruct patient/family to call for assistance with activity based on nursing assessment  - Administer anti-seizure medications if ordered    Outcome: Progressing  Goal: Achieves maximal functionality and self care  Description: INTERVENTIONS  - Monitor swallowing and airway patency with patient fatigue and changes in neurological status  - Encourage and assist patient to increase activity and self care.   - Encourage visually impaired, hearing impaired  and aphasic patients to use assistive/communication devices  Outcome: Progressing     Problem: CARDIOVASCULAR - ADULT  Goal: Maintains optimal cardiac output and hemodynamic stability  Description: INTERVENTIONS:  - Monitor I/O, vital signs and rhythm  - Monitor for S/S and trends of decreased cardiac output  - Administer and titrate ordered vasoactive medications to optimize hemodynamic stability  - Assess quality of pulses, skin color and temperature  - Assess for signs of decreased coronary artery perfusion  - Instruct patient to report change in severity of symptoms  Outcome: Progressing  Goal: Absence of cardiac dysrhythmias or at baseline rhythm  Description: INTERVENTIONS:  - Continuous cardiac monitoring, vital signs, obtain 12 lead EKG if ordered  - Administer antiarrhythmic and heart rate control medications as ordered  - Monitor electrolytes and administer replacement therapy as ordered  Outcome: Progressing     Problem: Potential for Falls  Goal: Patient will remain free of falls  Description: INTERVENTIONS:  - Educate patient/family on patient safety including physical limitations  - Instruct patient to call for assistance with activity   - Consult OT/PT to assist with strengthening/mobility   - Keep Call bell within reach  - Keep bed low and locked with side rails adjusted as appropriate  - Keep care items and personal belongings within reach  - Initiate and maintain comfort rounds  - Make Fall Risk Sign visible to staff  - Apply yellow socks and bracelet for high fall risk patients  - Consider moving patient to room near nurses station  Outcome: Progressing     Problem: MUSCULOSKELETAL - ADULT  Goal: Maintain or return mobility to safest level of function  Description: INTERVENTIONS:  - Assess patient's ability to carry out ADLs; assess patient's baseline for ADL function and identify physical deficits which impact ability to perform ADLs (bathing, care of mouth/teeth, toileting, grooming, dressing,  etc.)  - Assess/evaluate cause of self-care deficits   - Assess range of motion  - Assess patient's mobility  - Assess patient's need for assistive devices and provide as appropriate  - Encourage maximum independence but intervene and supervise when necessary  - Involve family in performance of ADLs  - Assess for home care needs following discharge   - Consider OT consult to assist with ADL evaluation and planning for discharge  - Provide patient education as appropriate  Outcome: Progressing  Goal: Maintain proper alignment of affected body part  Description: INTERVENTIONS:  - Support, maintain and protect limb and body alignment  - Provide patient/ family with appropriate education  Outcome: Progressing     Problem: METABOLIC, FLUID AND ELECTROLYTES - ADULT  Goal: Electrolytes maintained within normal limits  Description: INTERVENTIONS:  - Monitor labs and assess patient for signs and symptoms of electrolyte imbalances  - Administer electrolyte replacement as ordered  - Monitor response to electrolyte replacements, including repeat lab results as appropriate  - Instruct patient on fluid and nutrition as appropriate  Outcome: Progressing  Goal: Fluid balance maintained  Description: INTERVENTIONS:  - Monitor labs   - Monitor I/O and WT  - Instruct patient on fluid and nutrition as appropriate  - Assess for signs & symptoms of volume excess or deficit  Outcome: Progressing  Goal: Glucose maintained within target range  Description: INTERVENTIONS:  - Monitor Blood Glucose as ordered  - Assess for signs and symptoms of hyperglycemia and hypoglycemia  - Administer ordered medications to maintain glucose within target range  - Assess nutritional intake and initiate nutrition service referral as needed  Outcome: Progressing     Problem: RESPIRATORY - ADULT  Goal: Achieves optimal ventilation and oxygenation  Description: INTERVENTIONS:  - Assess for changes in respiratory status  - Assess for changes in mentation and  behavior  - Position to facilitate oxygenation and minimize respiratory effort  - Oxygen administered by appropriate delivery if ordered  - Initiate smoking cessation education as indicated  - Encourage broncho-pulmonary hygiene including cough, deep breathe, Incentive Spirometry  - Assess the need for suctioning and aspirate as needed  - Assess and instruct to report SOB or any respiratory difficulty  - Respiratory Therapy support as indicated  Outcome: Progressing

## 2024-11-06 NOTE — ASSESSMENT & PLAN NOTE
History of chronic bipolar 1 disorder and follows with outpatient psychiatry and was seen inpatient  Mood has been stable and is maintained on Zyprexa, bupropion and trazodone as well as Ingrezza for tardive dyskinesia  Follow-up with psychiatry as an outpatient and consider virtual consultation if indicated for any changes while on ARC

## 2024-11-06 NOTE — ASSESSMENT & PLAN NOTE
Presented to the hospital significantly elevated blood pressure with systolic ranging from 197-231  Was placed on a Cardizem drip initially for hypertensive emergency  Had been on Demadex 20 mg daily but was on hold due to the increasing creatinine  Cw labetalol 300 mg BID , nifedipine 90 mg daily and hydralazine 25mg Q8h- hydral dec to 10 mg Q8h,   Goals for normotension  Mgmt per IM  Follow-up with nephrology as an outpatient

## 2024-11-06 NOTE — TEAM CONFERENCE
Acute RehabilitationTeam Conference Note  Date: 11/6/2024   Time: 10:09 AM       Patient Name:  Tommie Taylor       Medical Record Number: [id number]   YOB: 1965  Sex: Male          Room/Bed:  [facility] 261/[facility] 261-01  Payor Info:  Payor: MEDICARE / Plan: MEDICARE A AND B / Product Type: Medicare A & B Fee for Service /      Admitting Diagnosis: CVA (cerebral vascular accident) ([facility]) [I63.9]   Admit Date/Time:  10/26/2024  5:13 PM  Admission Comments: No comment available     Primary Diagnosis:  CVA (cerebral vascular accident) ([facility])  Principal Problem: CVA (cerebral vascular accident) ([facility])    Patient Active Problem List    Diagnosis Date Noted    Orthostatic hypotension 11/05/2024    Fall during current hospitalization 10/31/2024    At risk for alteration in bowel function 10/28/2024    Overgrown toenails 10/28/2024    Impaired mobility and activities of daily living 10/26/2024    Stroke-like symptoms 10/22/2024    Acute kidney injury superimposed on stage 4 chronic kidney disease ([facility]) 10/17/2024    Encephalopathy 10/17/2024    Volume overload 10/17/2024    Acid-base disorder, mixed 10/17/2024    CVA (cerebral vascular accident) ([facility]) 10/15/2024    Type 2 diabetes mellitus with stage 4 chronic kidney disease and hypertension ([facility]) 10/08/2024    Vision decreased 06/05/2024    Memory deficit 04/15/2024    Tremor 04/15/2024    B12 deficiency 04/15/2024    Hypertension 11/27/2023    Tardive dyskinesia 08/16/2023    Lightheaded 08/02/2023    Loss of appetite 08/02/2023    Unintended weight loss 08/02/2023    Class 3 severe obesity due to excess calories with serious comorbidity and body mass index (BMI) of 40.0 to 44.9 in adult ([facility]) 03/01/2023    Vitamin D deficiency 04/09/2021    Hyperlipidemia 02/11/2020    Toenail deformity 11/01/2019    Persistent proteinuria 08/28/2019    Anemia, unspecified 08/28/2019    Elevated serum creatinine 04/17/2019    Stage 4 chronic kidney disease ([facility]) 04/17/2019    DAISY  (obstructive sleep apnea)     Generalized anxiety disorder 06/19/2018    Hiatal hernia 06/17/2018    Lower esophageal ring ([facility]) 06/17/2018    Insomnia 05/14/2018    Bipolar I disorder, severe, current or most recent episode depressed, with psychotic features ([facility]) 11/06/2017    Panic attacks 11/06/2017    GERD (gastroesophageal reflux disease) 09/05/2017    Flat foot 05/03/2016    Diabetic polyneuropathy ([facility]) 12/11/2014    DM2 (diabetes mellitus, type 2) ([facility]) 12/11/2014    Allergic rhinitis 08/31/2012       Physical Therapy:    Weight Bearing Status: Full Weight Bearing  Transfers: Minimal Assistance  Bed Mobility: Incidental Touching  Amulation Distance (ft): 150 feet  Ambulation: Moderate Assistance, Incidental Touching, Minimal Assistance (Pt progressing but fluctuates, also BP has been dropping)  Assistive Device for Ambulation:  (no device)  Wheelchair Mobility Distance: 50 ft  Wheelchair Mobility: Minimal Assistance  Number of Stairs: 12  Assistive Device for Stairs: Lehft Hand Rail  Stair Assistance: Minimal Assistance  Ramp: Minimal Assistance  Discharge Recommendations: Home with:  DC Home with:: Family Support, 24 Hour Supervision    60 y/o male pt who presented to [facility] on 10/14/2024 via EMS as prehospital stroke alert d/t fall following episode of dizziness and sudden onset of right sided weakness. He was administered TNK after BP was controlled. Following TNK, patient developed worsening dysarthria and headache. Repeat CTH was unremarkable. MRI of brain showed acute left corona radiata infarct. ECHO showed EF 70%, moderate concentric hypertrophy, grade 2 diastolic dysfunction, mild AVR. At baseline pt was I without AD lives with disabled spouse uses power chair and HD, 2SH with 1+1 ASHOK front door. Can have first floor setup if needed. Questionable support at DC.     10/29/24  Pt with hypertonic R UE, R hemiparesis limiting strength, balance, and ambulation. Slow progress  with amb limited by Ftg and R LE weakness. Pt to greatly benefit from continued skilled PT intervention to maximize post stroke recovery, questionable family support at DC, will need to establish what support pt has to unsure safe DC home when physically appropriate. Reteam.     11/5/24  Pt showing progress with shorter distance ambulation requires CG level but still Min/ Mod assist with longer distances because blood pressure is dropping and pt drags R leg more.  Pt still needs Min A on steps which he does have steps at home.  Need to contact family to assess if they will be able to provide S at all times for pts safety and fall reduction.  Pt will need to continue stroke rehab to reach full supervision level.  Pts having orthostatic hypotension and much fatigue which has limited past 2 days of therapy with any progression.  Cont gait training/ steps/ ramp/ and transfers to reach supervision level.  Pt also needs to work on balance and reaction to LOB as pt is very high fall risk.  Pt also showing decrease memory which is impacting carryover.  Questionable family support and family expectations of pt at dc, can have first floor setup. Suggest reteam to optimize post stroke functional outcomes.       Occupational Therapy:  Eating: Supervision (set-up)  Grooming: Supervision  Bathing: Moderate Assistance  Bathing: Moderate Assistance  Upper Body Dressing: Minimal Assistance  Lower Body Dressing: Minimal Assistance (footwear mod A)  Toileting: Moderate Assistance (intermittent incontinence of urine, having BM infrequently whihc he reports is baseline)  Tub/Shower Transfer: Minimal Assistance  Toilet Transfer: Minimal Assistance  Cognition: Exceptions to WNL  Cognition: Decreased Memory, Decreased Executive Functions, Decreased Attention, Decreased Safety, Decreased Comprehension  Orientation: Person, Place, Time, Situation  Discharge Recommendations: Home with:  DC Home with:: 24 Hour Assistance, Family Support, First  Floor Setup, Home Occupational Therapy       10/29/24  59 y.o male admitted with chief complaint of dizziness and sudden onset right sided weakness.MRI brain showed acute left corona radiata infarct. Pt has a past medical history significant for: ADHD, anxiety, bipolar disorder, CKD4, CVA, tardive dyskinesia, depression, DM, GERD, HLD, HTN, neuropathy, sleep apnea with CPAP. PRECAUTIONS: fall risk, impulsive. Pt presents with the following impairments: balance, strength, endurance, cognition, impaired tone, motor control. Pt would benefit from skilled occupational therapy services with focus on ADL retraining, strength, balance, functional transfers, NMR and cognition to ensure safe discharge and participation in functional activities to increase independence. Unclear if family will be equipped to assist pt at home safely. Pt overall requiring mod-maxA for basic ADL, not yet ambulatory w/ ADL routine. Anticipate 17 to 21 day ELOS to meet supervision level goals.     11/5/24  Pt continues to demonstrate progress toward functional goals through training in compensatory strategies and one-handed estefania techniques, use of estim and NMR techniques to improve active muscle recruitment in LUE, ability to utilize the limb as a stabilizing assist during functional tasks, graded balance tasks in seated and standing positions. OT plans to focus on education with family in the next week regarding Pt's CLOF and potential need for assistance in the home following his stay at [facility] to determine if they are capable of providing that level of assistance or if Pt will require subacute rehab prior to returning home. Pt's CLOF is min A overall for BADLs and mobility, except for toileting which is mod A due to intermittent incontinence episodes.    Speech Therapy:  Mode of Communication: Verbal  Speech/Language: Dysarthia  Cognition: Exceptions to WNL  Cognition: Decreased Memory, Decreased Executive Functions, Decreased Attention,  Decreased Comprehension, Decreased Safety  Orientation: Person, Place, Time, Situation  Discharge Recommendations: Home with:  DC Home with:: 24 Hour Supervision, 24 Hour Assisteance, Family Support, Home Speech Therapy, Outpatient Speech Therapy  Pt currently being followed for cognitive and speech tx sessions. In regard to cognitive skills, pt was able to complete formalized cognitive assessment, CLQT+ on initial evaluation with a Composite Severity Rating score of 1.2 out of 4.0, correlating to overall severely impaired cognitive linguistic impairments at time of evaluation and in comparison to age matched peers ranging from 18-70 y/o. Cognitive barriers which present include: decreased attention, ST memory recall , problem solving, reasoning, sequencing, organization of thoughts, judgement, slower processing, insight, and as well as fatigue, which still impacts pt's overall safety, functional cognitive communication skills as well as functional mobility. The following interventions are used to target these barriers, including verbal problem solving task, verbal working memory tasks, visual memory recall tasks, drawing conclusions activities, written sequencing tasks, verbal sequencing tasks, categorization tasks , picture problem solving activities, verbal reasoning tasks, verbal review of current medications,  written health management tasks, verbal health management tasks, functional reading tasks , and family education/training.     Additionally, pt completed Motor Speech Evaluation, in which pt is demonstrating speech intelligibility to be moderate-severely impaired at the sentence and conversational level. Pt elicited the following errors imprecise articulation, distorted speech sounds, decreased breath support for speech and fast rate of speech. Pt noted to have decrease production and sentence and conversation level over time in evaluation with pt getting more fatigued. Pt reports insight with decrease  intelligibility. Speech barriers which present include: decreased intelligibility decreased at word, sentence and conversational level, imprecise articulation , and faster rate of speech which still impacts pt's overall safety, functional cognitive communication skills as well as functional mobility.  The following interventions are used to target these barriers, including review of speech strategies for carryover in conversation, OME review and speech drills.     Current level of functioning:   Comprehension: min A  Expression: mod-max A  Social Interaction: min A  Executive functions: mod A   Memory: mod A      This week, focus for continued services to target review of OME's, speech strategies, use of speech drills to maximize speech intelligibility. Also will initiate targeting cognitive skills targeting ST recall, problem solving, organization of thoughts, sequencing, as well as maximizing more insight to stroke education and prevention. Family training/education has not been initiated with pt's family but will be needed throughout pt's stay and in preparation for discharge. Recommendations at time of discharge are for continued skilled SLP services but pending progress to determine home vs OP.    At this time, pt will continue to benefit from skilled cognitive linguistic tx and speech/apraxia tx session to maximize overall functional independence given overall cognitive linguistic skills and speech and intelligibility in attempts to decreased caregiver burden over time.           Update from week 11/5/2024: Pt is being followed for cognitive linguistic and speech sessions to where pt is making slower and steady progress this week.     Continued cognitive barriers which present include: decreased attention, ST memory recall , problem solving, reasoning, sequencing, organization of thoughts, judgement, slower processing, insight, and as well as fatigue, which impact pt's overall safety, functional cognitive  communication skills and functional mobility. The following interventions are used to target these barriers, including verbal problem solving task, verbal working memory tasks, visual memory recall tasks, drawing conclusions activities, written sequencing tasks, verbal sequencing tasks, categorization tasks , picture problem solving activities, verbal reasoning tasks, written financial management tasks, verbal review of current medications, written review of medications, written calendar tasks, tangible scheduling tasks ,  written health management tasks, verbal health management tasks, visual attention tasks, functional reading tasks , and family education/training.     Continued speech barriers which present include: imprecise articulation, distorted speech sounds, decreased breath support for speech and fast rate of speech, which impact pt's overall functional communication skills as his verbal expression continues to be limited by at least moderate dysarthria, noted at the phrase and sentence level with more severe deficits in speech intelligibility at the conversational level. The following interventions are used to target these barriers, including including review of speech strategies for carryover in conversation, OME review and speech drills.     Current level of functioning:   Comprehension: min A  Expression: min A-supervision  Social Interaction: min A-supervision  Executive functions: mod A  Memory: mod A    Family training/education has not yet been initiated, however, plan to begin with pt's wife this week. Current recommendations at time of discharge are for pt to have assistance with all IADLs due to cognitive linguistic deficits. This week, focus for continued services to target initiating higher level IADL skills and assessment of tasks such as finance management, medication management, and planning/managing appts, along with continued focus on memory, safety, problem solving and speech  intelligibility. At this time, pt is recommended for and will continue to benefit form further skilled SLP services focusing on overall cognitive linguistic skills and speech intelligibility skills in order to maximize independence on discharge.       Nursing Notes:  Appetite: Good  Diet Type: Diabetic, 2 gram sodium diet                                                                     Pain Location/Orientation: Orientation: Right, Location: Shoulder (and R lateral pec during horiz ABD PROM to RUE)  Pain Score: 0                       Hospital Pain Intervention(s): Rest          Tommie Taylor is a 59 y.o. male with history of bipolar disorder, CKD stage IV, type 2 diabetes, hypertension, tardive dyskinesia, sleep apnea, GERD who presented to the [facility] on 10/14/2024 for right-sided weakness and facial droop.  MRI revealed a left corona radiata infarct and neurology was consulted and was administered TNK.  He initially required a Cardene drip for his hypertensive emergency and was eventually placed on dual antiplatelet therapy with plan to continue aspirin and Plavix until that date and continue with aspirin as monotherapy as well as atorvastatin for secondary stroke prophylaxis.  He was recommended for Zio patch placement as an outpatient.  His hospital course was complicated by acute kidney injury on top of his CKD thought to be related to ATN in the setting of uncontrolled blood pressure as well as contrast associated nephropathy.  Nephrology did follow during his course and had some levels of improvement however has not returned back to his baseline level of creatinine.  Additionally there was some levels of encephalopathy which are stable at this time. The patient was evaluated by the Rehabilitation team and deemed an appropriate candidate for comprehensive inpatient rehabilitation and admitted to the [facility] on 10/26/2024  5:13 PM      Pain managed with tylenol 650mg PRN Q6h. DVT  prophylaxis heparin 7,500 Q8h. CVA managed with ASA, 81 mg daily, lipitor 40 mg daily, plavix 75 mg daily, heparin. Recommendation for a Zio patch as an outpatient. DM managed with sliding scale and 4 times daily Accu-Cheks and as well as a diabetic diet. HTN managed with hydralazine 25 mg Q8h, labetalol 300 mg BID, and nifedipine 90 mg daily. Tardive dyskinesia managed with Valbenazine Tosylate 40 mg daily at bedtime. HLD managed with lipitor 40 mg every evening. DAISY managed with CPAP. Insomnia managed with trazadone 50 mg PRN at bedtime. GERD managed with protonix 40 mg daily and PRN TUMS. Bipolar I disorder managed with Zyprexa 10 mg daily at bedtime and bupropion 100 mg BID. PRN Mirlax 17 g packet for constipation and lactulose oral solution 20 g 3 times daily, IV venofer 200 mg in sodium chloride 0.9% 100 mL IVPB daily for anemia initiated by nephrology      This week we will monitor vital signs and lab values. We will keep patient safe from falls by continuing with hourly rounding, maintaining bed/chair alarms, and keeping call bell within reach. We will educate patient on importance of turning and repositioning to off load pressure to prevent skin break down. We will manage pain so that patient may perform optimally in therapy sessions. We will teach patient energy conservation and promote independence of ADLs.     Case Management:     Discharge Planning  Living Arrangements: Lives w/ Spouse/significant other, Lives w/ Family members  Support Systems: Self, Spouse/significant other, Family members  Assistance Needed: to be determined  Type of Current Residence: Private residence  Current Home Care Services: No  Patient admitted to [facility] on 10/26/24 after inpatient hospital stay for stroke. Patient lives with wife in 2SH with 1STE full flight of stairs to second floor. Full bath with walk in shower on the first floor. Patient uses CPAP at HS.  Patient restless at times. [facility] vs SNF    Is the patient actively  participating in therapies? yes  List any modifications to the treatment plan:     Barriers Interventions   Orthostatic BP, anemia Abdominal binder, TEDS, Iron infusion.   Incontinent bowel  Medication management   Family support, ASHOK  Stair training, family training with wife,niece and daughter   Right sided estefania paresis impulsive, fatigue Therapeutic  ,E stim,strengthening exercises   Insight to deficits Repetitive cues   Cognition, insight safety, dysarthria Speech exercises         Is the patient making expected progress toward goals? yes  List any update or changes to goals:     Medical Goals: Patient will be medically stable for discharge to [facility] upon completion of rehab program and Patient will be able to manage medical conditions and comorbid conditions with medications and follow up upon completion of rehab program  PT: S goals with transfers, w/c goals  OT: set up  min A,up S, LB, S toileting  ST;min A to S for all, while awake    Weekly Team Goals:   Rehab Team Goals  ADL Team Goal: Patient will require supervision with ADLs with least restrictive device upon completion of rehab program  Transfer Team Goal: Patient will require supervision with transfers with least restrictive device upon completion of rehab program  Locomotion Team Goal: Patient will require supervision with locomotion with least restrictive device upon completion of rehab program  Cognitive Team Goal: Patient will be independent for basic  tasks and require supervision for complex tasks upon completion of rehab program    Discussion: PT: mod/max A transfers , total A 150, declining  with Bp issues, functioning  changes with fatigue and BP  OT:Min A ADL's, total A with toileting  sT Mod A problem solving memory, expression, min A comprehension    Anticipated Discharge Date:  reteam, with potential for 11/18, RN/PT/OT/ST/A  [facility] Team Members Present:The following team members are supervising care for this  patient and were present during this Weekly Team Conference.    Physician: Dr. Ruben MD  : Sol Kumari, RN  Registered Nurse: Jess Mazariegos, RN, BSN, CRRN  Physical Therapist: Ciara Antony, PT  Occupational Therapist: Luanne Sidhu MS, OTR/L  Speech Therapist: Kelsie Rousseau MS, CCC-SLP

## 2024-11-06 NOTE — ASSESSMENT & PLAN NOTE
Lab Results   Component Value Date    EGFR 17 11/06/2024    EGFR 17 11/04/2024    EGFR 17 11/01/2024    CREATININE 3.65 (H) 11/06/2024    CREATININE 3.66 (H) 11/04/2024    CREATININE 3.53 (H) 11/01/2024     Creatinine currently 3.65.  Baseline creatinine 2.5-2.9.    Avoid nephrotoxins.  Home diuretics currently on hold.  Encourage hydration.  Renal ultrasound demonstrates no hydro but mild bladder wall thickening.   Nephrology following.  Labs MWF per Nephro.  Follows with Dr. No (Nephrology) as outpatient.

## 2024-11-06 NOTE — ASSESSMENT & PLAN NOTE
Lab Results   Component Value Date    HGBA1C 8.6 (H) 10/16/2024       Recent Labs     11/05/24  1113 11/05/24  1600 11/05/24 2042 11/06/24  0556   POCGLU 111 100 119 106     Most recent hemoglobin A1c of 8.6 and technically uncontrolled although blood sugars in the hospital have been well-controlled  Currently on semaglutide as an outpatient but was on hold due to ROSINA in the hospital  Continue sliding scale and 4 times daily Accu-Cheks and as well as a diabetic diet

## 2024-11-06 NOTE — PROGRESS NOTES
11/06/24 1440   Pain Assessment   Pain Assessment Tool 0-10   Pain Score No Pain   Restrictions/Precautions   Precautions Bed/chair alarms;Cognitive;Fall Risk;Supervision on toilet/commode   Comprehension   Comprehension (FIM) 4 - Understands basic info/conversation 75-90% of time   Expression   Expression (FIM) 4 - Expresses basic info/needs 75-90% of time   Social Interaction   Social Interaction (FIM) 5 - Interacts appropriately with others 90% of time   Problem Solving   Problem solving (FIM) 3 - Solves basic problmes 50-74% of time   Memory   Memory (FIM) 3 - Recognizes, recalls/performs 50-74%   Speech/Language/Cognition Assessment   Treatment Assessment Pt participated in skilled SLP session focusing on cognitive linguistic skills. In review of pt's PLOF, pt reported that his wife writes out all bills and will and will send in, as well as will pay any in cash that are needed. After further discussion, it was noted that pt was inconsistent in his reports of managing finances as yesterday and earlier in session he stated that he manages them but after follow up questions, it appears as his wife manages all.     Regarding medication management, pt reported that he managed his own medications and did not use a pill box. He recalled taking ~10 meds at baseline. Targeting this area, pt was presented with a mock prescription med label. Verbally presented with comprehension questions, pt independently located info to answer questions with 4/8 accuracy. Of note, at beginning of session, pt wore his glasses but reported that the print was too small, although it had already been enlarged. SLP then enlarged the info further to which pt then reported being able to see. Pt also presented with decreased visual attention to the R side. SLP provided education about inattention post stroke and focus of further sessions to target this area. Pt benefited from overall increased time to answer each question, along with both  visual and verbal cues to improve scanning and comprehension of info to answer remaining questions. Targeting working memory, pt was provided with a review of memory strategies from yesterday. Engaged in a word list retention task in which pt was verbally presented with Fo4 words. Pt then recalled words based on category inclusion with 12/17 accuracy, improving to 17/17 given a single repetition of stimuli.     Overall, pt noted to be more awake and alert this session, noting improvement in recall, speech clarity and comprehension. At this time, pt is recommended for and will continue to benefit form further skilled SLP services focusing on overall cognitive linguistic skills and speech intelligibility skills in order to maximize independence on discharge.    SLP Therapy Minutes   SLP Time In 1440   SLP Time Out 1510   SLP Total Time (minutes) 30   SLP Mode of treatment - Individual (minutes) 30   SLP Mode of treatment - Concurrent (minutes) 0   SLP Mode of treatment - Group (minutes) 0   SLP Mode of treatment - Co-treat (minutes) 0   SLP Mode of Treatment - Total time(minutes) 30 minutes   SLP Cumulative Minutes 305   Therapy Time missed   Time missed? No

## 2024-11-06 NOTE — PLAN OF CARE
Problem: SAFETY ADULT  Goal: Patient will remain free of falls  Description: INTERVENTIONS:  - Educate patient/family on patient safety including physical limitations  - Instruct patient to call for assistance with activity   - Consult OT/PT to assist with strengthening/mobility   - Keep Call bell within reach  - Keep bed low and locked with side rails adjusted as appropriate  - Keep care items and personal belongings within reach  - Initiate and maintain comfort rounds  - Make Fall Risk Sign visible to staff  - Offer Toileting every 2 Hours, in advance of need  - Initiate/Maintain bed and chair alarm  - Obtain necessary fall risk management equipment: call bell within reach, side rails up and functioning  - Apply yellow socks and bracelet for high fall risk patients  - Consider moving patient to room near nurses station  Outcome: Progressing     Problem: NEUROSENSORY - ADULT  Goal: Achieves stable or improved neurological status  Description: INTERVENTIONS  - Monitor and report changes in neurological status  - Monitor vital signs such as temperature, blood pressure, glucose, and any other labs ordered   - Initiate measures to prevent increased intracranial pressure  - Monitor for seizure activity and implement precautions if appropriate      Outcome: Progressing     Problem: RESPIRATORY - ADULT  Goal: Achieves optimal ventilation and oxygenation  Description: INTERVENTIONS:  - Assess for changes in respiratory status  - Assess for changes in mentation and behavior  - Position to facilitate oxygenation and minimize respiratory effort  - Oxygen administered by appropriate delivery if ordered  - Initiate smoking cessation education as indicated  - Encourage broncho-pulmonary hygiene including cough, deep breathe, Incentive Spirometry  - Assess the need for suctioning and aspirate as needed  - Assess and instruct to report SOB or any respiratory difficulty  - Respiratory Therapy support as indicated  Outcome:  Progressing

## 2024-11-06 NOTE — ASSESSMENT & PLAN NOTE
Hgb currently 9.3.  Hemoglobin 12.1 on admission.   Most recent iron panel on 10/15: Iron Sat 38%, TIBC 204, Iron 77, and Ferritin 111.  No need for iron supplementation at this time.  FOBT 2 out of 3 negative.  3rd to be obtained.  Started on IV venofer x3 days per Nephrology recs.  Continue to trend routine CBC.

## 2024-11-07 LAB
DME PARACHUTE DELIVERY DATE REQUESTED: NORMAL
DME PARACHUTE ITEM DESCRIPTION: NORMAL
DME PARACHUTE ORDER STATUS: NORMAL
DME PARACHUTE SUPPLIER NAME: NORMAL
DME PARACHUTE SUPPLIER PHONE: NORMAL
GLUCOSE SERPL-MCNC: 105 MG/DL (ref 65–140)
GLUCOSE SERPL-MCNC: 135 MG/DL (ref 65–140)
GLUCOSE SERPL-MCNC: 144 MG/DL (ref 65–140)
GLUCOSE SERPL-MCNC: 89 MG/DL (ref 65–140)

## 2024-11-07 PROCEDURE — 99232 SBSQ HOSP IP/OBS MODERATE 35: CPT | Performed by: INTERNAL MEDICINE

## 2024-11-07 PROCEDURE — 82948 REAGENT STRIP/BLOOD GLUCOSE: CPT

## 2024-11-07 PROCEDURE — 97110 THERAPEUTIC EXERCISES: CPT

## 2024-11-07 PROCEDURE — 97530 THERAPEUTIC ACTIVITIES: CPT

## 2024-11-07 PROCEDURE — 94660 CPAP INITIATION&MGMT: CPT

## 2024-11-07 PROCEDURE — 97116 GAIT TRAINING THERAPY: CPT

## 2024-11-07 PROCEDURE — 97112 NEUROMUSCULAR REEDUCATION: CPT

## 2024-11-07 RX ADMIN — HYDRALAZINE HYDROCHLORIDE 10 MG: 10 TABLET ORAL at 14:00

## 2024-11-07 RX ADMIN — HEPARIN SODIUM 7500 UNITS: 5000 INJECTION INTRAVENOUS; SUBCUTANEOUS at 21:11

## 2024-11-07 RX ADMIN — LABETALOL HYDROCHLORIDE 300 MG: 100 TABLET, FILM COATED ORAL at 21:11

## 2024-11-07 RX ADMIN — HYDRALAZINE HYDROCHLORIDE 10 MG: 10 TABLET ORAL at 05:22

## 2024-11-07 RX ADMIN — ASPIRIN 81 MG: 81 TABLET, COATED ORAL at 08:42

## 2024-11-07 RX ADMIN — ACETAMINOPHEN 650 MG: 325 TABLET ORAL at 07:37

## 2024-11-07 RX ADMIN — HYDRALAZINE HYDROCHLORIDE 10 MG: 10 TABLET ORAL at 21:11

## 2024-11-07 RX ADMIN — PANTOPRAZOLE SODIUM 40 MG: 40 TABLET, DELAYED RELEASE ORAL at 05:22

## 2024-11-07 RX ADMIN — BUPROPION HYDROCHLORIDE 100 MG: 100 TABLET, FILM COATED ORAL at 08:41

## 2024-11-07 RX ADMIN — LABETALOL HYDROCHLORIDE 300 MG: 100 TABLET, FILM COATED ORAL at 08:41

## 2024-11-07 RX ADMIN — HEPARIN SODIUM 7500 UNITS: 5000 INJECTION INTRAVENOUS; SUBCUTANEOUS at 14:00

## 2024-11-07 RX ADMIN — NIFEDIPINE 90 MG: 30 TABLET, FILM COATED, EXTENDED RELEASE ORAL at 08:41

## 2024-11-07 RX ADMIN — HEPARIN SODIUM 7500 UNITS: 5000 INJECTION INTRAVENOUS; SUBCUTANEOUS at 05:22

## 2024-11-07 RX ADMIN — VALBENAZINE 40 MG: 40 CAPSULE ORAL at 21:13

## 2024-11-07 RX ADMIN — DOCUSATE SODIUM 100 MG: 100 CAPSULE, LIQUID FILLED ORAL at 08:41

## 2024-11-07 RX ADMIN — TRAZODONE HYDROCHLORIDE 50 MG: 50 TABLET ORAL at 21:11

## 2024-11-07 RX ADMIN — ATORVASTATIN CALCIUM 40 MG: 40 TABLET, FILM COATED ORAL at 17:16

## 2024-11-07 RX ADMIN — IRON SUCROSE 200 MG: 20 INJECTION, SOLUTION INTRAVENOUS at 09:01

## 2024-11-07 RX ADMIN — MELATONIN TAB 3 MG 3 MG: 3 TAB at 21:11

## 2024-11-07 RX ADMIN — BUPROPION HYDROCHLORIDE 100 MG: 100 TABLET, FILM COATED ORAL at 17:15

## 2024-11-07 RX ADMIN — FLUTICASONE PROPIONATE 1 SPRAY: 50 SPRAY, METERED NASAL at 08:41

## 2024-11-07 RX ADMIN — SENNOSIDES 17.2 MG: 8.6 TABLET, FILM COATED ORAL at 21:11

## 2024-11-07 RX ADMIN — DOCUSATE SODIUM 100 MG: 100 CAPSULE, LIQUID FILLED ORAL at 17:15

## 2024-11-07 RX ADMIN — LORATADINE 5 MG: 10 TABLET ORAL at 08:41

## 2024-11-07 RX ADMIN — OLANZAPINE 10 MG: 2.5 TABLET, FILM COATED ORAL at 21:11

## 2024-11-07 NOTE — ASSESSMENT & PLAN NOTE
History of chronic bipolar 1 disorder and follows with outpatient psychiatry and was seen inpatient  Mood has been stable and is maintained on Zyprexa, bupropion and trazodone as well as Ingrezza for tardive dyskinesia  Follow-up with psychiatry as an outpatient and consider virtual consultation if indicated for any changes while on ARC   no

## 2024-11-07 NOTE — ASSESSMENT & PLAN NOTE
Patient with fall to knees at shift change 10/31   Denied any knee pain  Get larger bariatric chair   Fall precautions.    Home

## 2024-11-07 NOTE — ASSESSMENT & PLAN NOTE
Lab Results   Component Value Date    HGBA1C 8.6 (H) 10/16/2024       Recent Labs     11/05/24  2042 11/06/24  0556 11/06/24  1605 11/07/24  0536   POCGLU 119 106 117 105       Blood Sugar Average: Last 72 hrs:  (P) [id number]    Had not been taking anything at home.  Ran out of Ozempic a few months ago.  BG has been well controlled.  Continue cons. carb diet and BID accuchecks.

## 2024-11-07 NOTE — PROGRESS NOTES
11/07/24 1400   Pain Assessment   Pain Assessment Tool 0-10   Pain Score No Pain   Restrictions/Precautions   Precautions Bed/chair alarms;Cognitive;Fall Risk;Supervision on toilet/commode   Weight Bearing Restrictions No   ROM Restrictions No   Braces or Orthoses   (BLE TEDs)   Lying to Sitting on Side of Bed   Type of Assistance Needed Physical assistance;Verbal cues   Physical Assistance Level 26%-50%   Comment A for trunk control w/increased time to move LEs   Lying to Sitting on Side of Bed CARE Score 3   Sit to Stand   Type of Assistance Needed Incidental touching;Verbal cues   Physical Assistance Level No physical assistance   Comment CGA, no AD   Sit to Stand CARE Score 4   Bed-Chair Transfer   Type of Assistance Needed Incidental touching;Verbal cues   Physical Assistance Level No physical assistance   Comment CGA SPT, no AD, gait belt donned   Chair/Bed-to-Chair Transfer CARE Score 4   Exercise Tools   Exercise Tools Yes   Other Exercise Tool 1 Seated EOB, [id number] each of BUE chest press, shoulder flexion, and elbow flexion using self-ROM (fingers laced together and LUE guiding RUE). Focus was on improving B/L coordination and UE strength during fxl transfers. Pt tolerated well, requiring Patricia for R elbow and wrist support through chest press and shoulder flexion. Pt denied pain. Brief rest breaks requiring to manage RUE fatigue.   Cognition   Overall Cognitive Status Impaired   Arousal/Participation Alert;Cooperative   Attention Attends with cues to redirect   Orientation Level Oriented X4   Memory Decreased short term memory;Decreased recall of recent events;Decreased recall of precautions   Following Commands Follows one step commands with increased time or repetition   Activity Tolerance   Activity Tolerance Patient tolerated treatment well   Assessment   Treatment Assessment Pt seen for 30min skilled OT session focused on RUE AAROM (self-ROM), bed mobility, fxl transfer training, and providing tub  bench measurements as pt's wife was requesting these to determine if tub bench will fit at home. See detailed descriptions of fxl performance above. Pt tolerated brief session well but continues to be limited by R-sided weakness, decreased fxl cognition, endurance, and balance. Provided tub bench measurement of 28'' wide by 20'' deep, w/adjustable height. Please review this with pt's wife during FT tomorrow. Pt would benefit from continued skilled OT focused on ADL retraining, FT, dynamic sitting/standing balance, and RUE neuro re-ed.   Prognosis Good   Problem List Decreased strength;Decreased range of motion;Decreased endurance;Impaired balance;Decreased mobility;Decreased coordination;Decreased cognition;Impaired judgement;Decreased safety awareness;Impaired tone;Obesity;Pain   Plan   Treatment/Interventions ADL retraining;Functional transfer training;Therapeutic exercise;Endurance training;Patient/family training;Equipment eval/education;Bed mobility;Compensatory technique education   Progress Slow progress, decreased activity tolerance   Discharge Recommendation   Rehab Resource Intensity Level, OT   (pending)   OT Therapy Minutes   OT Time In 1400   OT Time Out 1430   OT Total Time (minutes) 30   OT Mode of treatment - Individual (minutes) 30   OT Mode of treatment - Concurrent (minutes) 0   OT Mode of treatment - Group (minutes) 0   OT Mode of treatment - Co-treat (minutes) 0   OT Mode of Treatment - Total time(minutes) 30 minutes   OT Cumulative Minutes 775   Therapy Time missed   Time missed? No

## 2024-11-07 NOTE — PROGRESS NOTES
11/07/24 1500   Restrictions/Precautions   Precautions Bed/chair alarms;Cognitive;Fall Risk;Multiple lines   Sit to Stand   Type of Assistance Needed Incidental touching   Comment CG no device   Sit to Stand CARE Score 4   Bed-Chair Transfer   Type of Assistance Needed Incidental touching   Comment CG no device   Chair/Bed-to-Chair Transfer CARE Score 4   Walk 10 Feet   Type of Assistance Needed Incidental touching   Comment CG no device   Walk 10 Feet CARE Score 4   Walk 50 Feet with Two Turns   Type of Assistance Needed Incidental touching   Comment CG no device   Walk 50 Feet with Two Turns CARE Score 4   Walk 150 Feet   Comment not challenged this session   Walking 10 Feet on Uneven Surfaces   Type of Assistance Needed Incidental touching   Comment CG amb no device on floor mat   Walking 10 Feet on Uneven Surfaces CARE Score 4   Ambulation   Primary Mode of Locomotion Prior to Admission Walk   Distance Walked (feet) 125 ft  (125x1  60x2)   Walk Assist Level Contact Guard   Findings Pt more CG this session, less fatigue, R leg seemed stronger and much less scuffing , kept distances shorter, Pt amb without device   Does the patient walk? 2. Yes   4 Steps   Type of Assistance Needed Physical assistance   Physical Assistance Level 25% or less   Comment Min A with single rail using reciprocal gt pattern- cues for clearing heel over edge of step   4 Steps CARE Score 3   12 Steps   Type of Assistance Needed Physical assistance   Physical Assistance Level 25% or less   Comment Min A with single rail using reciprocal gt pattern- cues for clearing heel over edge of step   12 Steps CARE Score 3   Therapeutic Interventions   Neuromuscular Re-Education had pt push WC back to room (placed R hand on handle)  used for dual task purpose   Assessment   Treatment Assessment 30 min skilled PT session pt performed amb bouts which he showed much improved energy and R leg clearance overall CS / CG for safety/ balance.  PT performed  steps (still catching R heel on step going down), amb on floor mat and pushed chair back to room for dual task.  Pt appeared much better this afternoon than this mornings session.  Cont skilled PT toward LTGs   PT Therapy Minutes   PT Time In 1230   PT Time Out 1300   PT Total Time (minutes) 30   PT Mode of treatment - Individual (minutes) 30   PT Mode of treatment - Concurrent (minutes) 0   PT Mode of treatment - Group (minutes) 0   PT Mode of treatment - Co-treat (minutes) 0   PT Mode of Treatment - Total time(minutes) 30 minutes   PT Cumulative Minutes 985   Therapy Time missed   Time missed? No

## 2024-11-07 NOTE — ASSESSMENT & PLAN NOTE
Patient was evaluated by the rehabilitation team MD and an appropriate candidate for acute inpatient rehabilitation program at this time.  The patient will tolerate 3 hours/day 5 to 7 days/week of intensive physical, occupational and speech therapy in order to obtain goals for community discharge  Due to the patient's functional Compared to their baseline level of function in addition to their ongoing medical needs, the patient would benefit from daily supervision from a rehabilitation physician as well as rehabilitation nursing to implement and adjust the medical as well as functional plan of care in order to meet the patient's goals.  DC: 11/18 homecare PT/OT/SLP/RN/ANA LILIAA

## 2024-11-07 NOTE — PROGRESS NOTES
"   11/06/24 1230   Pain Assessment   Pain Assessment Tool 0-10   Pain Score No Pain   Restrictions/Precautions   Precautions Bed/chair alarms;Fall Risk   Weight Bearing Restrictions No   ROM Restrictions No   General   Change In Medical/Functional Status Pt had IV iron running upon entry. 2/3 infusions. Infusion completed and IV disconnected by nursing prior to leaving pt room.   Cognition   Overall Cognitive Status Impaired   Arousal/Participation Alert;Cooperative   Attention Attends with cues to redirect   Orientation Level Oriented X4;Oriented to person;Oriented to place;Oriented to time;Oriented to situation   Memory Decreased short term memory;Decreased recall of precautions;Decreased recall of recent events   Following Commands Follows one step commands with increased time or repetition   Comments Pt lethargic throughout session, but able to attend and participate in therapy when redirected.   Subjective   Subjective Pt denies pain. Stated SPC felt \"unsteady\" when walking.   Sit to Stand   Type of Assistance Needed Physical assistance   Physical Assistance Level 25% or less   Comment Min Ax1 without AD   Sit to Stand CARE Score 3   Bed-Chair Transfer   Type of Assistance Needed Physical assistance   Physical Assistance Level 25% or less   Comment CGAx1 to Min A when turning to sit   Chair/Bed-to-Chair Transfer CARE Score 3   Car Transfer   Comment (S)  Repeat car transfer trial   Walk 10 Feet   Type of Assistance Needed Physical assistance;Incidental touching;Verbal cues   Physical Assistance Level 25% or less   Comment CGA to Min A x1 for balance with cueing for larger steps   Walk 10 Feet CARE Score 3   Walk 50 Feet with Two Turns   Type of Assistance Needed Physical assistance;Verbal cues   Physical Assistance Level 25% or less   Comment Min A with and without AD for balance. R leg has less clearance with increased fatigue.   Walk 50 Feet with Two Turns CARE Score 3   Ambulation   Primary Mode of " "Locomotion Prior to Admission Walk   Distance Walked (feet) 50 ft  (First bout, no weight. 3x50 ft with 2# R ankle weight for increased neuroplasticity, 1x60 ft with 2# R ankle weight, 2x35 ft no ankle weight; one bout with SPC; 85 ft back to room no AD.)   Assist Device Cane  (For one bout of walking. Pt reported he does not like the cane and feels \"unsteady\")   Gait Pattern Slow Lesley;Decreased foot clearance;R foot drag;R hemiparesis;Step to;Trendelenburg;Decreased R stance;Improper weight shift   Limitations Noted In Balance;Coordination;Endurance;Safety;Speed;Swing;Strength   Provided Assistance with: Balance;Direction   Walk Assist Level Minimum Assist   Findings Orlando is CGAx1 to Min A when ambulating no AD. He displays significant R trendelenberg which is decreased with use of SPC. Orlando has decreased weight shift to his R side due to hemiparesis, and decreased clearance of the R foot during swing.   Does the patient walk? 2. Yes   Wheel 50 Feet with Two Turns   Type of Assistance Needed Supervision   Physical Assistance Level No physical assistance   Comment Supervision with use of R Leg rest.   Wheel 50 Feet with Two Turns CARE Score 4   Wheelchair mobility   Does the patient use a wheelchair? 1. Yes   Type of Wheelchair Used 1. Manual   Method Right upper extremity;Right lower extremity   Assistance Provided For Locking Brakes;Remove Leg Rest;Remove armrests   Distance Level Surface (feet) 83 ft   Findings Patient able to perform wheelchair propulsion utilizing L UE and LLE for propulsion.   Picking Up Object   Comment (S)  trial item    Therapeutic Interventions   Strengthening Seated TE: LAQ 2# 3x10 Chito wioth extra support under R thigh due position in WC, Seated Marches 2# 2x10, Seated TB Abduction Green TB 2x10   Neuromuscular Re-Education 3 bouts walking in Parallel bars forward and backward 10 ft. 6 bouts forward walking over 4 SPC as obstacles to step over and encourage reciprocation. Pt. " CGA x1 in parallel bars. 4 bouts 10 ft walking forward over 4 SPC and 1 Quad cane.   Assessment   Treatment Assessment Pt participated in 90 minute skilled PT session focusing on gait and neuro recovery as well as TE for strengthening. Pt tolerated treatment well as evidenced by no reported increase in pain. BP dropped from sitting to standing, however, patient was not symptomatic. TEDs were worn throughout the session. Pt able to ambulate up to 60 ft at a time with Min Ax1 for balance and varied R foot clearance. Pt requires extra assistance due to decreased balance and poor coordination and motor planning with Min A to recover from LOB. Pt. fatigues with further distances. Weight was added to the R ankle to increase motor unit recruitment and encourage neuroplasticity and neuro recovery. Pt reported having a ramp at home as an alternate way to enter the house. Orlando would benefit from trial of ramp or uneven surfaces as well as item . Trial TUG as functional outcome measure. DC plan for pt is week of 11/18. Family training set for Friday with spouse.   Plan   Progress Progressing toward goals   PT Therapy Minutes   PT Time In 1230   PT Time Out 1400   PT Total Time (minutes) 90   PT Mode of treatment - Individual (minutes) 90   PT Mode of treatment - Concurrent (minutes) 0   PT Mode of treatment - Group (minutes) 0   PT Mode of treatment - Co-treat (minutes) 0   PT Mode of Treatment - Total time(minutes) 90 minutes   PT Cumulative Minutes 865   Therapy Time missed   Time missed? No

## 2024-11-07 NOTE — ASSESSMENT & PLAN NOTE
Presented with hypertensive emergency on initial hospitalization.   Concerns about medication non-compliance.  Home regimen: labetalol 100mg BID and amlodipine 10mg daily.  Currently on labetalol 300mg BID, Nifedipine 90mg daily, and hydralazine 10mg Q8 hours.  Nephrology following.

## 2024-11-07 NOTE — ASSESSMENT & PLAN NOTE
Noted to have orthostatic hypotension during therapy   Apply TEDs/Binder if available for patient size.   Fall precautions  Monitor anemia as above   BP medication titration per IM

## 2024-11-07 NOTE — ASSESSMENT & PLAN NOTE
Lab Results   Component Value Date    HGBA1C 8.6 (H) 10/16/2024       Recent Labs     11/05/24  2042 11/06/24  0556 11/06/24  1605 11/07/24  0536   POCGLU 119 106 117 105     Most recent hemoglobin A1c of 8.6 and technically uncontrolled although blood sugars in the hospital have been well-controlled  Currently on semaglutide as an outpatient but was on hold due to ROSINA in the hospital  Continue sliding scale and 4 times daily Accu-Cheks and as well as a diabetic diet

## 2024-11-07 NOTE — PROGRESS NOTES
11/07/24 3147   Pain Assessment   Pain Assessment Tool 0-10   Pain Score 5   Pain Location/Orientation Location: Head   Pain Onset/Description Onset: Gradual;Descriptor: Headache   Hospital Pain Intervention(s)   (usha Jalloh present to provide pain med)   Restrictions/Precautions   Precautions Bed/chair alarms;Cognitive;Fall Risk;Supervision on toilet/commode   Weight Bearing Restrictions No   ROM Restrictions No   Braces or Orthoses   (BLE TEDs)   Eating   Type of Assistance Needed Set-up / clean-up   Physical Assistance Level No physical assistance   Comment S/U A for breakfast meal while seated in w/c at end of OT session.  A to cut food and open containers   Eating CARE Score 5   Health Management   Health Management Level of Assistance Moderate assistance   Health Management Pt engaged in review of current list of daily medications and tangible med mgmt activity, to assess for IND w/med mgmt prior to D/C. Therapist hand wrote out a med chart for pt to keep and review, listing each medication name, purpose, dosage, and frequency. Pt demonstrated P knowledge of current meds, not able to recognize medication name or state purpose for 80% of his medications. Pt also unable to state if he was taking most of his meds PTA or not. KIRKLAND educated pt on each medication, purpose, and frequency, with pt currently taking 12 daily meds in pill form, plus heparin injection and flonase nose spray. Pt reports not using a pill box PTA, just taking meds straight from the bottle, and occasionally forgetting to take his meds.  During tangible med dosing task with AM/PM organizer, pt dropped 5 pills on floor, requiring physical assist to retrieve. Pt with 3 errors dosing meds (putting pill in wrong day slot), requiring physical assist to correct and vc's to recognize errors. 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 Due to decreased fine motor skills, cognition, and color-blindness, recommending pt use 2x/day pill box (AM/PM) upon D/C with Sup/Assist from pt's wife to ensure accuracy.   Cognition   Overall Cognitive Status Impaired   Arousal/Participation Alert;Cooperative   Attention Attends with cues to redirect   Orientation Level Oriented X4   Memory Decreased short term memory;Decreased recall of recent events;Decreased recall of precautions   Following Commands Follows one step commands with increased time or repetition   Activity Tolerance   Activity Tolerance Patient tolerated treatment well   Assessment   Treatment Assessment Pt seen for 60min skilled OT session focused on medication management and S/U of breakfast meal, for increased independence w/ADLs/IADLs and decreased caregiver burden. See detailed descriptions of fxl performance above. Pt tolerated session but continues to be limited by R-sided weakness, decreased fxl cognition, endurance, and balance. Due to decreased R FMC, fxl cognition, and color-blindness, recommending pt has SUP/Assist from wife for med mgmt at home, using 2x/day pill organizer. Pt receptive. Please educate pt's wife on this during FT session tomorrow. Pt would benefit from continued skilled OT focused on ADL retraining, FT, dynamic sitting/standing balance, and RUE neuro re-ed.   Prognosis Good   Problem List Decreased strength;Decreased endurance;Impaired balance;Decreased mobility;Decreased coordination;Decreased cognition;Impaired judgement;Decreased safety awareness;Obesity;Pain   Plan   Treatment/Interventions ADL retraining;Functional transfer training;Therapeutic exercise;Endurance training;Patient/family training;Equipment eval/education;Bed mobility;Compensatory technique education;Spoke to nursing   Progress Progressing toward goals   Discharge Recommendation   Rehab Resource Intensity Level, OT   (pending)   OT Therapy Minutes   OT Time In 0730   OT Time Out 0830   OT  Total Time (minutes) 60   OT Mode of treatment - Individual (minutes) 60   OT Mode of treatment - Concurrent (minutes) 0   OT Mode of treatment - Group (minutes) 0   OT Mode of treatment - Co-treat (minutes) 0   OT Mode of Treatment - Total time(minutes) 60 minutes   OT Cumulative Minutes 745   Therapy Time missed   Time missed? No

## 2024-11-07 NOTE — PROGRESS NOTES
11/07/24 0930   Pain Assessment   Pain Assessment Tool 0-10   Pain Score No Pain   Restrictions/Precautions   Precautions Bed/chair alarms;Cognitive;Fall Risk;Multiple lines;Supervision on toilet/commode  (hooked up to IV)   Braces or Orthoses   (TEDs and binder (need to fit pt with binder))   Sit to Stand   Type of Assistance Needed Physical assistance   Physical Assistance Level 26%-50%   Comment Mod A at worst point in session -  CG at best   Sit to Stand CARE Score 3   Bed-Chair Transfer   Type of Assistance Needed Physical assistance   Physical Assistance Level 26%-50%   Comment Mod A for balance when pt is fatigued,  stand pivot no device   Chair/Bed-to-Chair Transfer CARE Score 3   Car Transfer   Type of Assistance Needed Physical assistance   Physical Assistance Level 25% or less   Comment Min A for safely getting buttocks back on chair all the way   Car Transfer CARE Score 3   Walk 10 Feet   Type of Assistance Needed Physical assistance   Physical Assistance Level 26%-50%   Comment Min / Mod A no device   Walk 10 Feet CARE Score 3   Walk 50 Feet with Two Turns   Type of Assistance Needed Physical assistance   Physical Assistance Level 26%-50%   Comment Min/ Mod A no device   Walk 50 Feet with Two Turns CARE Score 3   Walk 150 Feet   Comment (S)  Pt was very fatigued at 120 feet and needed to sit in nearest chair   Reason if not Attempted Safety concerns   Walk 150 Feet CARE Score 88   Walking 10 Feet on Uneven Surfaces   Type of Assistance Needed Physical assistance   Physical Assistance Level 25% or less   Comment Min A on ramp- some inc R leg scuffing when ascending   Walking 10 Feet on Uneven Surfaces CARE Score 3   Ambulation   Primary Mode of Locomotion Prior to Admission Walk   Distance Walked (feet) 120 ft  (120x1 75x1 80x1,  40x1)   Assist Device   (none)   Gait Pattern Slow Lesley;R foot drag;R hemiparesis   Limitations Noted In Balance;Coordination;Endurance;Heel  Strike;Posture;Safety;Speed;Strength;Swing   Provided Assistance with: Balance   Walk Assist Level Contact Guard;Moderate Assist   Findings First pt was very unsteady upon initial amb bout- had some post bias and difficulty getting balance -  Switched to priming on Nustep but then pt had episode of blurry vision and slight headache-  MD assessed pt and thinks its BP related,  Pt was able to perform more walking t/o session would start off at CG / min A but progresses to Mod A and more R leg drag with longer distances   Does the patient walk? 2. Yes   Curb or Single Stair   Reason if not Attempted Safety concerns   1 Step (Curb) CARE Score 88   4 Steps   Comment Pt more unsteady this session, did not perform steps   Reason if not Attempted Safety concerns   4 Steps CARE Score 88   12 Steps   Reason if not Attempted Safety concerns   12 Steps CARE Score 88   Therapeutic Interventions   Neuromuscular Re-Education Amb on floor mat to challenge uneven terrain (min A ),  Amb retro  at handrial 10x2   Other Pt scored 40 seconds on TUG no device which is high fall risk   Equipment Use   NuStep 10 mins L 3  with R hand assist  SPM 60   Other Comments   Comments sitting /80   standing 109/67   sitting post blurry vision episode 164/80   Assessment   Treatment Assessment 90 min skilled PT session pt initially hooked up to IV - pt initially very unsteady with standing from chair had difficulty with static balance and stated i just feel wobbly.  Pt did perform 1 walk of 40 feet and felt like he needed to sit,  did not pts BP is dropping still which this could be cause.  Performed Nustep for more primming and pt had episode in front of MD of blurry vision and slight headache,  MD performed scan,  pts BP was goood, she okayed to continue therapy -  these symtpoms did subside in a few minutes,  did perform some walking t/o session,  wasnt tolerating HIGT  and was getting fatigued after 50 / 60 feet to were RLE got very weak  again. Pt continues to show great flucuations which is challenging to get productive sessions in, pt doesnt tolerate HIGT well,  pt high fall risk scoring on TUG at 40 seconds.  Cont skilled PT working on gait training/ balance/ NPP/ steps/ and ramp.   Barriers to Discharge   (has ramp to enter)   PT Barriers   Physical Impairment Decreased strength;Decreased endurance;Impaired balance;Decreased mobility;Decreased cognition;Decreased safety awareness   Plan   Progress Slow progress, decreased activity tolerance   PT Therapy Minutes   PT Time In 0930   PT Time Out 1100   PT Total Time (minutes) 90   PT Mode of treatment - Individual (minutes) 90   PT Mode of treatment - Concurrent (minutes) 0   PT Mode of treatment - Group (minutes) 0   PT Mode of treatment - Co-treat (minutes) 0   PT Mode of Treatment - Total time(minutes) 90 minutes   PT Cumulative Minutes 955   Therapy Time missed   Time missed? No

## 2024-11-07 NOTE — ASSESSMENT & PLAN NOTE
Initial ED presentation 10/14/2024 with RUE and RLE extremity weakness and R facial droop. CTH and CTA were initially negative for acute process, LKW was 1 hour prior to presentation.   , cardene drip initiated to stabilized BP, TNK administration after stabilization of HTN. Symptoms worsened s/p TNK but f/u CT again demonstrated no acute process.   24 hr post-TNK CT negative.   MRI completed, demonstrated indicated left corona radiata stroke.     Completed 3 weeks of DAPT on 11/6.  Continue with ASA 81mg daily.  Continue statin.    Neurovascular checks Q shift.  Maintain normotension.    Follow up with Neurovascular in 4-6 weeks as outpatient.  Recommending Zio patch/Loop.     PT/OT/ST per primary team.     Complaints of worsening RLE weakness on 11/4 after therapies - CTH stable.  Noted to have orthostasis - recommend obtaining higher BP to prevent symptoms.

## 2024-11-07 NOTE — PLAN OF CARE
Problem: SAFETY ADULT  Goal: Patient will remain free of falls  Description: INTERVENTIONS:  - Educate patient/family on patient safety including physical limitations  - Instruct patient to call for assistance with activity   - Consult OT/PT to assist with strengthening/mobility   - Keep Call bell within reach  - Keep bed low and locked with side rails adjusted as appropriate  - Keep care items and personal belongings within reach  - Initiate and maintain comfort rounds  - Make Fall Risk Sign visible to staff  - Offer Toileting every  Hours, in advance of need  - Initiate/Maintain alarm  - Obtain necessary fall risk management equipment:   - Apply yellow socks and bracelet for high fall risk patients  - Consider moving patient to room near nurses station  11/7/2024 1013 by Jess Cody, RN  Outcome: Progressing  11/7/2024 1013 by Jess Cody, RN  Outcome: Progressing

## 2024-11-07 NOTE — PROGRESS NOTES
Progress Note - Internal Medicine   Name: Tommie Taylor 59 y.o. male I MRN: [id number]  Unit/Bed#: -01 I Date of Admission: 10/26/2024   Date of Service: 11/7/2024 I Hospital Day: 12    Assessment & Plan  CVA (cerebral vascular accident) (HCC)  Initial ED presentation 10/14/2024 with RUE and RLE extremity weakness and R facial droop. CTH and CTA were initially negative for acute process, LKW was 1 hour prior to presentation.   , cardene drip initiated to stabilized BP, TNK administration after stabilization of HTN. Symptoms worsened s/p TNK but f/u CT again demonstrated no acute process.   24 hr post-TNK CT negative.   MRI completed, demonstrated indicated left corona radiata stroke.     Completed 3 weeks of DAPT on 11/6.  Continue with ASA 81mg daily.  Continue statin.    Neurovascular checks Q shift.  Maintain normotension.    Follow up with Neurovascular in 4-6 weeks as outpatient.  Recommending Zio patch/Loop.     PT/OT/ST per primary team.     Complaints of worsening RLE weakness on 11/4 after therapies - CTH stable.  Noted to have orthostasis - recommend obtaining higher BP to prevent symptoms.  Bipolar I disorder, severe, current or most recent episode depressed, with psychotic features ([facility])  Currently on bupropion 100mg BID and olanzapine 10mg at HS with valbenazine tosylate 40mg for tardive dyskinesia per home regimen. Follow up outpatient with psychiatry.   GERD (gastroesophageal reflux disease)  EGD with GI 10/14, directed to continue with Protonix. Tums available PRN for dyspepsia.   Insomnia  Educate and encourage on sleep hygiene. Continue Trazodone at HS for sleep.   DAISY (obstructive sleep apnea)  Continue use of CPAP with home settings.  Does not use CPAP at home - currently broken.  Recommend overnight oximetry prior to discharge.   Follow-up with Sleep Medicine as outpatient.  Anemia, unspecified  Hgb currently 9.3.  Hemoglobin 12.1 on admission.   Most recent iron panel on 10/15:  Iron Sat 38%, TIBC 204, Iron 77, and Ferritin 111.  No need for iron supplementation at this time.  FOBT 2 out of 3 negative.  3rd to be obtained.  Started on IV venofer x3 days per Nephrology recs.  Continue to trend routine CBC.  Hyperlipidemia  Most recent lipid panel 10/16/24: Cholesterol 154, triglycerides 120, HDL 40, LDL 90.  Continue Lipitor 40mg daily, increased from home 20mg daily.  Class 3 severe obesity due to excess calories with serious comorbidity and body mass index (BMI) of 40.0 to 44.9 in adult ([facility])  BMI 42.74 on admission. Encourage lifestyle modifications and provide support for nutritional teaching. Consult placed to nutrition services during acute course.   Tardive dyskinesia  Continue on home treatment Valbenazine Tosylate 40mg HS.   Hypertension  Presented with hypertensive emergency on initial hospitalization.   Concerns about medication non-compliance.  Home regimen: labetalol 100mg BID and amlodipine 10mg daily.  Currently on labetalol 300mg BID, Nifedipine 90mg daily, and hydralazine 10mg Q8 hours.  Nephrology following.   DM2 (diabetes mellitus, type 2) ([facility])  Lab Results   Component Value Date    HGBA1C 8.6 (H) 10/16/2024       Recent Labs     11/05/24  2042 11/06/24  0556 11/06/24  1605 11/07/24  0536   POCGLU 119 106 117 105       Blood Sugar Average: Last 72 hrs:  (P) [id number]    Had not been taking anything at home.  Ran out of Ozempic a few months ago.  BG has been well controlled.  Continue cons. carb diet and BID accuchecks.    Acute kidney injury superimposed on stage 4 chronic kidney disease ([facility])  Lab Results   Component Value Date    EGFR 17 11/06/2024    EGFR 17 11/04/2024    EGFR 17 11/01/2024    CREATININE 3.65 (H) 11/06/2024    CREATININE 3.66 (H) 11/04/2024    CREATININE 3.53 (H) 11/01/2024     Creatinine currently 3.65.  Baseline creatinine 2.5-2.9.    Avoid nephrotoxins.  Home diuretics currently on hold.  Encourage hydration.  Renal ultrasound  demonstrates no hydro but mild bladder wall thickening.   Nephrology following.  Labs MWF per Nephro.  Follows with Dr. No (Nephrology) as outpatient.  Encephalopathy  Lethargy and encephalopathic symptoms noted during acute course.  B12 and head CT WNL. No subsequent episodes noted.   EEG on 10/22 consistent with moderate nonspecific cerebral dysfunction, no seizure activity.  Continue to monitor behavior and symptoms for signs of changes.   Most recent MoCA was 18/30 in 2024.  Per Psych - if continues to have delirium consider discontinuing Wellbutrin.  Concerns DAISY could also be contributing to encephalopathy per acute care.  Vitamin D deficiency  Vitamin D level <7 on 10/7.  Continue home vitamin D 50,000u weekly.  Continue to follow-up with Nephrology as outpatient.  Orthostatic hypotension  Orthostatic + on .  Hydralazine decreased.  Started on IV Venofer.  Continue to monitor orthostatics.  Apply abdominal binder/TEDs as needed.  Encourage PO hydration.    VTE Pharmacologic Prophylaxis:   Pharmacologic: Heparin  Mechanical VTE Prophylaxis in Place: Yes - sequential compression devices.    Current Length of Stay: 12 day(s)    Current Patient Status: Inpatient Rehab     Discharge Plan: As per primary team.    Code Status: Level 1 - Full Code    Subjective:   Pt examined while pt sitting in WC in pt room.  Complaints of lightheadedness during therapy along with blurred vision at a separate time during therapy.  States that his legs did feel weak when he developed the lightheadedness.  Feels better while sitting in WC in room.  Vitals were obtained during session, only abnormality was an SBP of 160 while having the blurred vision, otherwise vitals were stable.  Denies any headaches, SOB, palpitations, or CP.  Denies any abdominal pain or nausea.  No further BMs since yesterday.  Slept well last night.      Objective:     Vitals:   Temp (24hrs), Av.7 °F (36.5 °C), Min:96.6 °F (35.9 °C), Max:98.5 °F  (36.9 °C)    Temp:  [96.6 °F (35.9 °C)-98.5 °F (36.9 °C)] 98.5 °F (36.9 °C)  HR:  [64-79] 77  Resp:  [16-18] 18  BP: (116-166)/(65-87) 152/78  SpO2:  [94 %-99 %] 96 %  Body mass index is 42.74 kg/m².     Review of Systems   Constitutional:  Negative for appetite change, chills, fatigue and fever.   HENT:  Negative for trouble swallowing.    Eyes:  Positive for visual disturbance (blurred vision during therapy session).   Respiratory:  Negative for cough, shortness of breath, wheezing and stridor.    Cardiovascular:  Negative for chest pain, palpitations and leg swelling.   Gastrointestinal:  Negative for abdominal distention, abdominal pain, constipation, diarrhea, nausea and vomiting.        LBM 11/6   Genitourinary:  Negative for difficulty urinating.   Musculoskeletal:  Positive for gait problem. Negative for arthralgias and back pain.   Neurological:  Positive for weakness and light-headedness (lightheadedness and weakness during therapy session, improved since being back in the room). Negative for dizziness, numbness and headaches.   Psychiatric/Behavioral:  Negative for dysphoric mood and sleep disturbance. The patient is not nervous/anxious.    All other systems reviewed and are negative.       Input and Output Summary (last 24 hours):       Intake/Output Summary (Last 24 hours) at 11/7/2024 0855  Last data filed at 11/6/2024 2211  Gross per 24 hour   Intake 480 ml   Output 1450 ml   Net -970 ml       Physical Exam:     Physical Exam  Vitals and nursing note reviewed.   Constitutional:       General: He is not in acute distress.     Appearance: Normal appearance. He is obese. He is not ill-appearing.   HENT:      Head: Normocephalic and atraumatic.   Cardiovascular:      Rate and Rhythm: Normal rate and regular rhythm.      Pulses: Normal pulses.      Heart sounds: Normal heart sounds. No murmur heard.     No friction rub.   Pulmonary:      Effort: Pulmonary effort is normal. No respiratory distress.       Breath sounds: Normal breath sounds. No wheezing or rhonchi.   Abdominal:      General: Abdomen is flat. Bowel sounds are normal. There is no distension.      Palpations: Abdomen is soft. There is no mass.      Tenderness: There is no abdominal tenderness. There is no guarding or rebound.      Hernia: No hernia is present.   Musculoskeletal:      Cervical back: Normal range of motion and neck supple. No tenderness.      Right lower leg: Edema (+1 pitting edema) present.      Left lower leg: Edema (+1 pitting edema) present.   Skin:     General: Skin is warm and dry.   Neurological:      Mental Status: He is alert and oriented to person, place, and time.      Cranial Nerves: Facial asymmetry present.      Motor: Weakness (R sided weakness) present.   Psychiatric:         Mood and Affect: Mood normal.         Behavior: Behavior normal.         Additional Data:     Labs:    Results from last 7 days   Lab Units 11/06/24  0447   WBC Thousand/uL 5.16   HEMOGLOBIN g/dL 9.3*   HEMATOCRIT % 27.1*   PLATELETS Thousands/uL 170   SEGS PCT % 51   LYMPHO PCT % 30   MONO PCT % 13*   EOS PCT % 5     Results from last 7 days   Lab Units 11/06/24  0447   SODIUM mmol/L 139   POTASSIUM mmol/L 4.5   CHLORIDE mmol/L 108   CO2 mmol/L 22   BUN mg/dL 31*   CREATININE mg/dL 3.65*   ANION GAP mmol/L 9   CALCIUM mg/dL 8.5   GLUCOSE RANDOM mg/dL 110         Results from last 7 days   Lab Units 11/07/24  0536 11/06/24  1605 11/06/24  0556 11/05/24  2042 11/05/24  1600 11/05/24  1113 11/05/24  0552 11/04/24  2100 11/04/24  1615 11/04/24  1107 11/04/24  0557 11/03/24  2034   POC GLUCOSE mg/dl 105 117 106 119 100 111 88 106 104 169* 120 143*               Labs reviewed    Imaging:    Imaging reviewed    Recent Cultures (last 7 days):           Last 24 Hours Medication List:   Current Facility-Administered Medications   Medication Dose Route Frequency Provider Last Rate    acetaminophen  650 mg Oral Q6H PRN Alex George DO      aspirin  81 mg Oral  Daily Alex S George, DO      atorvastatin  40 mg Oral QPM Alex S George, DO      buPROPion  100 mg Oral BID Alex S George, DO      calcium carbonate  1,000 mg Oral BID PRN Alex S George, DO      docusate sodium  100 mg Oral BID Cathy Miranda MD      fluticasone  1 spray Each Nare Daily SAMANTHA Kern      heparin (porcine)  7,500 Units Subcutaneous Q8H MIGEL Alex S George, DO      hydrALAZINE  10 mg Oral Q8H MIGEL SAMANTHA Kern      iron sucrose  200 mg Intravenous Daily ZHANG KernNP 200 mg (11/06/24 1018)    labetalol  300 mg Oral BID Alex S George, DO      lactulose  20 g Oral TID PRN Cathy Miranda MD      loratadine  5 mg Oral Daily SAMANTHA Kern      NIFEdipine  90 mg Oral Daily Alex S George, DO      OLANZapine  10 mg Oral HS Alex S George, DO      ondansetron  4 mg Oral Q6H PRN SAMANTHA Kern      pantoprazole  40 mg Oral Early Morning Alex S George, DO      polyethylene glycol  17 g Oral Daily Cathy Miranda MD      senna  2 tablet Oral HS Cathy Miranda MD      traZODone  50 mg Oral HS PRN Alex S George, DO      Valbenazine Tosylate  40 mg Oral HS Alex S George, DO          M*Modal software was used to dictate this note.  It may contain errors with dictating incorrect words or incorrect spelling. Please contact the provider directly with any questions.

## 2024-11-07 NOTE — PROGRESS NOTES
Progress Note - PMR   Name: Tommie Taylor 59 y.o. male I MRN: [id number]  Unit/Bed#: [facility] 261-01 I Date of Admission: 10/26/2024   Date of Service: 11/7/2024 I Hospital Day: 12     Assessment & Plan  CVA (cerebral vascular accident) ([facility])  Patient with uncontrolled hypertension and diabetes presents with right upper and right lower extremity weakness as well as facial droop  A CT of the head as well as a CTA of the head and neck were completed with negative for acute abnormalities for an acute process  TNK had been administered following the correction of his hypertension after being placed on a Cardene drip  After ministration of the TNK developed worsening dysarthria as well as headache and a repeat CT was still negative.  The 24 post TNK CT was also negative  Neurology followed the patient and MRI was completed and was significant for left corona radiata stroke  Placed on dual antiplatelet therapy for 3 weeks with plans for monotherapy with aspirin and statin indefinitely for secondary stroke prophylaxis (10/15-11/5) started on ASA and statin monotherapy   Recommendation for a Zio patch as an outpatient  Follow-up with neurovascular attending in 6 to 8 weeks  Physical, occupational and speech therapy while on the acute rehabilitation unit  Episode of worsening RLE weakness,   CT 11/4 no acute changes.   Bipolar I disorder, severe, current or most recent episode depressed, with psychotic features ([facility])  History of chronic bipolar 1 disorder and follows with outpatient psychiatry and was seen inpatient  Mood has been stable and is maintained on Zyprexa, bupropion and trazodone as well as Ingrezza for tardive dyskinesia  Follow-up with psychiatry as an outpatient and consider virtual consultation if indicated for any changes while on ARC  GERD (gastroesophageal reflux disease)  Continue Protonix as well as as needed Tums  Insomnia  Continue trazodone and consider sleep logs  Encourage use of CPAP   DAISY  (obstructive sleep apnea)  Continue CPAP with home settings  Ordered and compliant per records  Anemia, unspecified  Hemoglobin most recently of 10.4 which is up from 9.3 but has been hovering in the 10-11 range for most of admission  FOBT: neg x3 10/29,11/1,11/4  Started on venofer per nephrology x3  Continue to monitor with biweekly CBC or sooner if clinically indicated  Hyperlipidemia  Continue Lipitor 40 mg every evening  Class 3 severe obesity due to excess calories with serious comorbidity and body mass index (BMI) of 40.0 to 44.9 in adult ([facility])  Continue with exercise and promote lifestyle modification, weight loss counseling and management of medical conditions to optimize status  Tardive dyskinesia  Continue Ingrezza 40 mg at bedtime  Hypertension  Presented to the hospital significantly elevated blood pressure with systolic ranging from 197-231  Was placed on a Cardizem drip initially for hypertensive emergency  Had been on Demadex 20 mg daily but was on hold due to the increasing creatinine  Cw labetalol 300 mg BID , nifedipine 90 mg daily and hydralazine 25mg Q8h- hydral dec to 10 mg Q8h,   Goals for normotension  Mgmt per IM  Follow-up with nephrology as an outpatient  DM2 (diabetes mellitus, type 2) ([facility])  Lab Results   Component Value Date    HGBA1C 8.6 (H) 10/16/2024       Recent Labs     11/05/24  2042 11/06/24  0556 11/06/24  1605 11/07/24  0536   POCGLU 119 106 117 105     Most recent hemoglobin A1c of 8.6 and technically uncontrolled although blood sugars in the hospital have been well-controlled  Currently on semaglutide as an outpatient but was on hold due to ROSINA in the hospital  Continue sliding scale and 4 times daily Accu-Cheks and as well as a diabetic diet    Acute kidney injury superimposed on stage 4 chronic kidney disease ([facility])  Lab Results   Component Value Date    EGFR 17 11/06/2024    EGFR 17 11/04/2024    EGFR 17 11/01/2024    CREATININE 3.65 (H) 11/06/2024    CREATININE 3.66 (H)  11/04/2024    CREATININE 3.53 (H) 11/01/2024     Recent creatinine of 3.63 10/30 Prior baseline around 2.9-3.0  Etiology felt to be related potentially to ATN in the setting of uncontrolled hypertension and complicated by contrast associated nephropathy  Nephrology was consulted and followed and a UA showed microhematuria and proteinuria.  An ultrasound of the kidney/bladder showed wall thickening but no hydronephrosis  Bladder scans negative for retention  Monitor BMP triweekly or sooner if clinically indicated  Demadex has been on hold and had periodically required IV fluids  Nephrology following  Follow with nephrology as an outpatient or sooner on the ARC if any acute changes  Encephalopathy  Had lethargy on exam back on 10/17 in acute care with improvements however more recently had issues with confusion and decreased consciousness in the mornings that improves throughout the day with unclear etiology  Prior history of cognitive impairments with last MoCA score of 18  CT of the head was stable, B12 level (446) wnl  Was evaluated by psychiatry with no changes in medications recommended  EEG showed no epileptiform activity just moderate nonspecific dysfunction  Will need to monitor especially with change in environment now on the ARC for any changes  Impaired mobility and activities of daily living  Patient was evaluated by the rehabilitation team MD and an appropriate candidate for acute inpatient rehabilitation program at this time.  The patient will tolerate 3 hours/day 5 to 7 days/week of intensive physical, occupational and speech therapy in order to obtain goals for community discharge  Due to the patient's functional Compared to their baseline level of function in addition to their ongoing medical needs, the patient would benefit from daily supervision from a rehabilitation physician as well as rehabilitation nursing to implement and adjust the medical as well as functional plan of care in order to meet  the patient's goals.  DC: 11/18 homecare PT/OT/SLP/RN/HHA  At risk for alteration in bowel function  Constipated no bm for several days; lactulose PRN  Start colase, senna daily and miralax prn  Titrate as needed   Overgrown toenails  Cs podiatry   Vitamin D deficiency  <7 10/7  Mgmt per IM/nephrology   Fall during current hospitalization  Patient with fall to knees at shift change 10/31   Denied any knee pain  Get larger bariatric chair   Fall precautions.   Orthostatic hypotension  Noted to have orthostatic hypotension during therapy   Apply TEDs/Binder if available for patient size.   Fall precautions  Monitor anemia as above   BP medication titration per IM     History of Present Illness   Tommie Taylor is a 59 y.o. male with history of bipolar disorder, CKD stage IV, type 2 diabetes, hypertension, tardive dyskinesia, sleep apnea, GERD who presented to the [facility] on 10/14/2024 for right-sided weakness and facial droop.  MRI revealed a left corona radiata infarct and neurology was consulted and was administered TNK.  He initially required a Cardene drip for his hypertensive emergency and was eventually placed on dual antiplatelet therapy with plan to continue aspirin and Plavix until that date and continue with aspirin as monotherapy as well as atorvastatin for secondary stroke prophylaxis.  He was recommended for Zio patch placement as an outpatient.  His hospital course was complicated by acute kidney injury on top of his CKD thought to be related to ATN in the setting of uncontrolled blood pressure as well as contrast associated nephropathy.  Nephrology did follow during his course and had some levels of improvement however has not returned back to his baseline level of creatinine.  Additionally there was some levels of encephalopathy which are stable at this time. The patient was evaluated by the Rehabilitation team and deemed an appropriate candidate for comprehensive  inpatient rehabilitation and admitted to the [facility] on 10/26/2024  5:13 PM     Rehab Diagnosis: Impairment of mobility, safety, Activities of Daily Living (ADLs), and cognitive/communication skills due to Stroke:  01.2  Right Body Involvement (Left Brain)  Etiologic Dx: Left Corona Radiata Infarct  Date of Onset: 10/14/2024   Date of surgery: N/A  Chief Complaint: f/u stroke    Interval: Patient seen and examined in in therapy. Didn't sleep well overnight. On the new-step patient with episode of seeing spots. BP elevated after transfer to chair. Vision changes resolved after sitting in wheelchair. Noted slight frontal headache. Last BM 11/6. Denies any f/c/n/v, CP, SOB, abdominal pain, worsening weakness or sensation changes.       Objective   Functional Update:  Physical Therapy Occupational Therapy Speech Therapy   Weight Bearing Status: Full Weight Bearing  Transfers: Minimal Assistance  Bed Mobility: Incidental Touching  Amulation Distance (ft): 150 feet  Ambulation: Moderate Assistance, Incidental Touching, Minimal Assistance (Pt progressing but fluctuates, also BP has been dropping)  Assistive Device for Ambulation:  (no device)  Wheelchair Mobility Distance: 50 ft  Wheelchair Mobility: Minimal Assistance  Number of Stairs: 12  Assistive Device for Stairs: Lehft Hand Rail  Stair Assistance: Minimal Assistance  Ramp: Minimal Assistance  Discharge Recommendations: Home with:  DC Home with:: Family Support, 24 Hour Supervision   Eating: Supervision (set-up)  Grooming: Supervision  Bathing: Moderate Assistance  Bathing: Moderate Assistance  Upper Body Dressing: Minimal Assistance  Lower Body Dressing: Minimal Assistance (footwear mod A)  Toileting: Moderate Assistance (intermittent incontinence of urine, having BM infrequently whihc he reports is baseline)  Tub/Shower Transfer: Minimal Assistance  Toilet Transfer: Minimal Assistance  Cognition: Exceptions to WNL  Cognition: Decreased Memory, Decreased Executive  Functions, Decreased Attention, Decreased Safety, Decreased Comprehension  Orientation: Person, Place, Time, Situation   Mode of Communication: Verbal  Speech/Language: Dysarthia  Cognition: Exceptions to WNL  Cognition: Decreased Memory, Decreased Executive Functions, Decreased Attention, Decreased Comprehension, Decreased Safety  Orientation: Person, Place, Time, Situation  Discharge Recommendations: Home with:  DC Home with:: 24 Hour Supervision, 24 Hour Assisteance, Family Support, Home Speech Therapy, Outpatient Speech Therapy         Temp:  [96.6 °F (35.9 °C)-98.5 °F (36.9 °C)] 98.5 °F (36.9 °C)  HR:  [64-79] 77  Resp:  [16-18] 18  BP: (116-166)/(65-87) 152/78  SpO2:  [94 %-99 %] 96 %    Physical Exam    Gen: No acute distress, obese  HEENT: Moist mucus membranes, Normocephalic/Atraumatic  Cardiovascular: Regular rate, rhythm,  Heme/Extr: mild bilateral LE edema   Pulmonary: Non-labored breathing. Lungs CTAB  : No amezquita  GI:  non-distended. BS+, non tender   MSK: dysarthric RUE/RLE weakness, R facial droop, reciprocal gait observed with good clearance and speed  Integumentary: Skin is warm, dry. .  Neuro: Speech is intact. Appropriate to questioning.  Psych: Normal mood and affect.       Scheduled Meds:  Current Facility-Administered Medications   Medication Dose Route Frequency Provider Last Rate    acetaminophen  650 mg Oral Q6H PRN Alex S George, DO      aspirin  81 mg Oral Daily Alex S George, DO      atorvastatin  40 mg Oral QPM Alex S George, DO      buPROPion  100 mg Oral BID Alex S George, DO      calcium carbonate  1,000 mg Oral BID PRN Alex S George, DO      docusate sodium  100 mg Oral BID Cathy Miranda MD      fluticasone  1 spray Each Nare Daily SAMANTHA Kern      heparin (porcine)  7,500 Units Subcutaneous Q8H [facility] Alex S George, DO      hydrALAZINE  10 mg Oral Q8H [facility] SAMANTHA Kern      iron sucrose  200 mg Intravenous Daily SAMANTHA Kern 200 mg  (11/07/24 0901)    labetalol  300 mg Oral BID Alex S George, DO      lactulose  20 g Oral TID PRN Cathy Miranda MD      loratadine  5 mg Oral Daily SAMANTHA Kern      NIFEdipine  90 mg Oral Daily Alex S George, DO      OLANZapine  10 mg Oral HS Alex S George, DO      ondansetron  4 mg Oral Q6H PRN SAMANTHA Kern      pantoprazole  40 mg Oral Early Morning Alex S Ariel, DO      polyethylene glycol  17 g Oral Daily Cathy Miranda MD      senna  2 tablet Oral HS Cathy Miranda MD      traZODone  50 mg Oral HS PRN Alex S George, DO      Valbenazine Tosylate  40 mg Oral HS Alex S George, DO           Lab Results: I have reviewed the following results:  Results from last 7 days   Lab Units 11/06/24  0447 11/04/24  0504   HEMOGLOBIN g/dL 9.3* 9.1*   HEMATOCRIT % 27.1* 28.3*   WBC Thousand/uL 5.16 6.41   PLATELETS Thousands/uL 170 167     Results from last 7 days   Lab Units 11/06/24  0447 11/04/24  0504 11/01/24  0546   BUN mg/dL 31* 29* 27*   SODIUM mmol/L 139 138 138   POTASSIUM mmol/L 4.5 4.8 4.7   CHLORIDE mmol/L 108 108 109*   CREATININE mg/dL 3.65* 3.66* 3.53*              Cathy Miranda MD   Physical Medicine and Rehabilitation   11/07/24    I have spent a total time of 37 minutes in caring for this patient on the day of the visit/encounter including Counseling / Coordination of care, Documenting in the medical record, and Communicating with other healthcare professionals .

## 2024-11-07 NOTE — ASSESSMENT & PLAN NOTE
Continue use of CPAP with home settings.  Does not use CPAP at home - currently broken.  Recommend overnight oximetry prior to discharge.   Follow-up with Sleep Medicine as outpatient.

## 2024-11-08 ENCOUNTER — HOME HEALTH ADMISSION (OUTPATIENT)
Dept: HOME HEALTH SERVICES | Facility: HOME HEALTHCARE | Age: 59
End: 2024-11-08
Payer: MEDICARE

## 2024-11-08 PROBLEM — E83.9 CHRONIC KIDNEY DISEASE-MINERAL AND BONE DISORDER (CKD-MBD): Status: ACTIVE | Noted: 2024-11-08

## 2024-11-08 PROBLEM — M89.9 CHRONIC KIDNEY DISEASE-MINERAL AND BONE DISORDER (CKD-MBD): Status: ACTIVE | Noted: 2024-11-08

## 2024-11-08 PROBLEM — N18.9 CHRONIC KIDNEY DISEASE-MINERAL AND BONE DISORDER (CKD-MBD): Status: ACTIVE | Noted: 2024-11-08

## 2024-11-08 LAB
ANION GAP SERPL CALCULATED.3IONS-SCNC: 7 MMOL/L (ref 4–13)
BUN SERPL-MCNC: 36 MG/DL (ref 5–25)
CALCIUM SERPL-MCNC: 8.8 MG/DL (ref 8.4–10.2)
CHLORIDE SERPL-SCNC: 108 MMOL/L (ref 96–108)
CO2 SERPL-SCNC: 23 MMOL/L (ref 21–32)
CREAT SERPL-MCNC: 3.68 MG/DL (ref 0.6–1.3)
GFR SERPL CREATININE-BSD FRML MDRD: 17 ML/MIN/1.73SQ M
GLUCOSE P FAST SERPL-MCNC: 114 MG/DL (ref 65–99)
GLUCOSE SERPL-MCNC: 104 MG/DL (ref 65–140)
GLUCOSE SERPL-MCNC: 114 MG/DL (ref 65–140)
GLUCOSE SERPL-MCNC: 131 MG/DL (ref 65–140)
PHOSPHATE SERPL-MCNC: 4.5 MG/DL (ref 2.7–4.5)
POTASSIUM SERPL-SCNC: 4.7 MMOL/L (ref 3.5–5.3)
SODIUM SERPL-SCNC: 138 MMOL/L (ref 135–147)

## 2024-11-08 PROCEDURE — 97530 THERAPEUTIC ACTIVITIES: CPT

## 2024-11-08 PROCEDURE — 97110 THERAPEUTIC EXERCISES: CPT

## 2024-11-08 PROCEDURE — 99232 SBSQ HOSP IP/OBS MODERATE 35: CPT | Performed by: INTERNAL MEDICINE

## 2024-11-08 PROCEDURE — 80048 BASIC METABOLIC PNL TOTAL CA: CPT | Performed by: NURSE PRACTITIONER

## 2024-11-08 PROCEDURE — 82948 REAGENT STRIP/BLOOD GLUCOSE: CPT

## 2024-11-08 PROCEDURE — 97112 NEUROMUSCULAR REEDUCATION: CPT

## 2024-11-08 PROCEDURE — 84100 ASSAY OF PHOSPHORUS: CPT | Performed by: NURSE PRACTITIONER

## 2024-11-08 PROCEDURE — 97535 SELF CARE MNGMENT TRAINING: CPT

## 2024-11-08 PROCEDURE — 97116 GAIT TRAINING THERAPY: CPT

## 2024-11-08 RX ADMIN — HEPARIN SODIUM 7500 UNITS: 5000 INJECTION INTRAVENOUS; SUBCUTANEOUS at 05:49

## 2024-11-08 RX ADMIN — SENNOSIDES 17.2 MG: 8.6 TABLET, FILM COATED ORAL at 21:06

## 2024-11-08 RX ADMIN — ASPIRIN 81 MG: 81 TABLET, COATED ORAL at 09:07

## 2024-11-08 RX ADMIN — FLUTICASONE PROPIONATE 1 SPRAY: 50 SPRAY, METERED NASAL at 09:07

## 2024-11-08 RX ADMIN — ATORVASTATIN CALCIUM 40 MG: 40 TABLET, FILM COATED ORAL at 17:37

## 2024-11-08 RX ADMIN — HYDRALAZINE HYDROCHLORIDE 10 MG: 10 TABLET ORAL at 14:16

## 2024-11-08 RX ADMIN — BUPROPION HYDROCHLORIDE 100 MG: 100 TABLET, FILM COATED ORAL at 09:07

## 2024-11-08 RX ADMIN — HEPARIN SODIUM 7500 UNITS: 5000 INJECTION INTRAVENOUS; SUBCUTANEOUS at 14:16

## 2024-11-08 RX ADMIN — HEPARIN SODIUM 7500 UNITS: 5000 INJECTION INTRAVENOUS; SUBCUTANEOUS at 21:06

## 2024-11-08 RX ADMIN — HYDRALAZINE HYDROCHLORIDE 10 MG: 10 TABLET ORAL at 21:06

## 2024-11-08 RX ADMIN — NIFEDIPINE 90 MG: 30 TABLET, FILM COATED, EXTENDED RELEASE ORAL at 09:07

## 2024-11-08 RX ADMIN — BUPROPION HYDROCHLORIDE 100 MG: 100 TABLET, FILM COATED ORAL at 17:37

## 2024-11-08 RX ADMIN — DOCUSATE SODIUM 100 MG: 100 CAPSULE, LIQUID FILLED ORAL at 09:07

## 2024-11-08 RX ADMIN — PANTOPRAZOLE SODIUM 40 MG: 40 TABLET, DELAYED RELEASE ORAL at 05:49

## 2024-11-08 RX ADMIN — VALBENAZINE 40 MG: 40 CAPSULE ORAL at 21:08

## 2024-11-08 RX ADMIN — MELATONIN TAB 3 MG 3 MG: 3 TAB at 21:06

## 2024-11-08 RX ADMIN — LABETALOL HYDROCHLORIDE 300 MG: 100 TABLET, FILM COATED ORAL at 09:07

## 2024-11-08 RX ADMIN — TRAZODONE HYDROCHLORIDE 50 MG: 50 TABLET ORAL at 21:06

## 2024-11-08 RX ADMIN — LABETALOL HYDROCHLORIDE 300 MG: 100 TABLET, FILM COATED ORAL at 21:06

## 2024-11-08 RX ADMIN — LORATADINE 5 MG: 10 TABLET ORAL at 09:07

## 2024-11-08 RX ADMIN — POLYETHYLENE GLYCOL 3350 17 G: 17 POWDER, FOR SOLUTION ORAL at 09:07

## 2024-11-08 RX ADMIN — OLANZAPINE 10 MG: 2.5 TABLET, FILM COATED ORAL at 21:06

## 2024-11-08 RX ADMIN — DOCUSATE SODIUM 100 MG: 100 CAPSULE, LIQUID FILLED ORAL at 17:37

## 2024-11-08 RX ADMIN — HYDRALAZINE HYDROCHLORIDE 10 MG: 10 TABLET ORAL at 05:49

## 2024-11-08 NOTE — ASSESSMENT & PLAN NOTE
Hemoglobin most recently of 10.4 which is up from 9.3 but has been hovering in the 10-11 range for most of admission  FOBT: neg x3 10/29,11/1,11/4  Started on venofer per nephrology x3 completed   Continue to monitor with biweekly CBC or sooner if clinically indicated

## 2024-11-08 NOTE — ASSESSMENT & PLAN NOTE
Lab Results   Component Value Date    HGBA1C 8.6 (H) 10/16/2024       Recent Labs     11/07/24  1105 11/07/24  1602 11/07/24 2045 11/08/24  0618   POCGLU 135 89 144* 104     Most recent hemoglobin A1c of 8.6 and technically uncontrolled although blood sugars in the hospital have been well-controlled  Currently on semaglutide as an outpatient but was on hold due to ROSINA in the hospital  Continue sliding scale and 4 times daily Accu-Cheks and as well as a diabetic diet

## 2024-11-08 NOTE — ASSESSMENT & PLAN NOTE
Lab Results   Component Value Date    EGFR 17 11/08/2024    EGFR 17 11/06/2024    EGFR 17 11/04/2024    CREATININE 3.68 (H) 11/08/2024    CREATININE 3.65 (H) 11/06/2024    CREATININE 3.66 (H) 11/04/2024     Creatinine currently 3.68.  Baseline creatinine 2.5-2.9.    Avoid nephrotoxins.  Home diuretics currently on hold.  Encourage hydration.  Renal ultrasound demonstrates no hydro but mild bladder wall thickening.   Nephrology following.  Labs MWF per Nephro.  Follows with Dr. No (Nephrology) as outpatient.

## 2024-11-08 NOTE — ASSESSMENT & PLAN NOTE
Lab Results   Component Value Date    EGFR 17 11/08/2024    EGFR 17 11/06/2024    EGFR 17 11/04/2024    CREATININE 3.68 (H) 11/08/2024    CREATININE 3.65 (H) 11/06/2024    CREATININE 3.66 (H) 11/04/2024     Recent creatinine of 3.63 10/30 Prior baseline around 2.9-3.0  Etiology felt to be related potentially to ATN in the setting of uncontrolled hypertension and complicated by contrast associated nephropathy  Nephrology was consulted and followed and a UA showed microhematuria and proteinuria.  An ultrasound of the kidney/bladder showed wall thickening but no hydronephrosis  Bladder scans negative for retention  Monitor BMP triweekly or sooner if clinically indicated  Demadex has been on hold and had periodically required IV fluids  Nephrology following  Follow with nephrology as an outpatient or sooner on the ARC if any acute changes

## 2024-11-08 NOTE — ASSESSMENT & PLAN NOTE
Initially presented with R sided weakness and R facial droop.   CTH and CTA negative for acute process.   MRI showed left corona radiata stroke.   Received TNK.    Completed 3 weeks of DAPT on 11/6.  Continue with ASA 81mg daily.  Continue statin.    Neurovascular checks Q shift.  Maintain normotension.    Follow up with Neurovascular in 4-6 weeks as outpatient.  Recommending Zio patch/Loop.     PT/OT/ST per primary team.     Complaints of worsening RLE weakness on 11/4 after therapies - CTH stable.  Noted to have orthostasis - recommend obtaining higher BP to prevent symptoms.

## 2024-11-08 NOTE — ASSESSMENT & PLAN NOTE
Orthostatic + on 11/5.  Continue to monitor orthostatics.  Apply abdominal binder/TEDs as needed.  Encourage PO hydration.

## 2024-11-08 NOTE — PROGRESS NOTES
11/08/24 3435   Pain Assessment   Pain Assessment Tool 0-10   Pain Score No Pain   Patient's Stated Pain Goal No pain   Restrictions/Precautions   Precautions Bed/chair alarms;Fall Risk;Cognitive   Weight Bearing Restrictions No   ROM Restrictions No   Braces or Orthoses Other (Comment)  (TEDS and abdominal binder)   Cognition   Overall Cognitive Status Impaired   Arousal/Participation Alert;Cooperative   Attention Attends with cues to redirect   Orientation Level Oriented X4   Memory Decreased short term memory;Decreased recall of recent events;Decreased recall of precautions   Following Commands Follows one step commands with increased time or repetition   Activity Tolerance   Activity Tolerance Patient tolerated treatment well   Assessment   Treatment Assessment Pt engaged in 75min OT session primarily focused on providing family training to his significant other Marie and her dtr Reina. OT educated on pts CLOF, DC recommendations and DME needs. Currently, pt is grossly Min A  for ADLs and Total A for IADL mgmt including med mgmt, meal prep, laundry and finance mgmt. OT then discussed barriers that are contributing to the pts CLOF which includes activity tolerance, balance, functional standing tolerance, strength, R sided hemiplegia, cognitive impairments, slurred speech, coordination deficits, and orthostatic hypotension . Based on pts functional performance, DC recommendations are for assistance from Marie for bathing/drying posterior back/buttocks, assist from Reina for TEDs don/doffing, use of a pill box filled by Marie, TA with meals, laundry, financial mgmt. OT then addressed DME and care items with Pt and family to create a list of needs: PFBSC, transfer tub bench, commode liners, reusable/disposable bed cloths, gait belt, dependends. Pt believes they have a PFBSC from a fmaily member, wife to check and let therapist know if one needs to be ordered. OT explained the upcharge due to Pt's weight does not  qualify him for full coverage. They would like to go through insurance for the transfer bench if possible. OT plans to provide handouts on the reusable/disposable bed pads, commode liners, posey gait belt, pull-up style briefs with information on various places to purchase incontinence products. OT also provided stroke education to Pt and family during the session, which resulted in Pt would like a [email or web address] application for eventual outpt neuro rehab following in-home therapy, and a list of [facility] locations. All questions were answered appropriately with no further questions or concerns at this time. Family missed their appointment for training with PT this morning, so rescheduled for next Friday 11/15 1p-3p. OT confirmed that Pt is set for [place] home with family support and in-home services on 11/18/2024.   Prognosis Good   Problem List Decreased strength;Decreased range of motion;Decreased endurance;Impaired balance;Decreased mobility;Decreased coordination;Decreased cognition;Impaired judgement;Decreased safety awareness;Impaired tone;Obesity;Pain   Barriers to Discharge Inaccessible home environment;Decreased caregiver support   Plan   Treatment/Interventions ADL retraining;Functional transfer training;Therapeutic exercise;Endurance training;Patient/family training;Compensatory technique education  (NMR/estim)   Discharge Recommendation   Equipment Recommended Shower transfer bench ($$)   OT Therapy Minutes   OT Time In 1255   OT Time Out 1410   OT Total Time (minutes) 75   OT Mode of treatment - Individual (minutes) 75   OT Mode of treatment - Concurrent (minutes) 0   OT Mode of treatment - Group (minutes) 0   OT Mode of treatment - Co-treat (minutes) 0   OT Mode of Treatment - Total time(minutes) 75 minutes   OT Cumulative Minutes 850   Therapy Time missed   Time missed? No     Stroke Education Series    Pt and family participated in skilled Stroke Education Series in an individual setting to  "address the topic of Stroke 101: Understanding the Basics of Stroke in both verbal and written formats. Education within this session reviewed the basic structural and functional components of the brain and included information on the causes of stroke, related signs/symptoms, risk factors, and the process of stroke rehabilitation. The goal of this education was to provide the patient with general understanding of how the brain functions and how a stroke can impact his/her functional mobility and independence. In addition, the intention of this education is to provide the patient with the information to reduce the risk of a second stroke. Following education, pt's response to education is: verbalizes understanding.    To individualize the education, the following topics were included based upon the patients' past and current medical history: diabetes mellitus, hypertension, and hypercholesterolemia .  Additional topics that were covered include spasticity, decreased coordination, dysarthria, cognitive changes, depression, headches, pain, and nutrition.     Stroke Education Series    Patient and family participated in skilled Stroke Education Series in an individualized setting to address the topic of Caregiver Education post stroke in both verbal and written formats. Education within this session included the roles of family/caregivers within the rehabilitation process and how they will be involved and instrumental in his/her return home. In addition, education on how \"home life\" may be affected and the impact of caring for loved ones post stroke was provided. The goal of this education session was to provide patients and caregivers with a greater understanding of their roles in the rehabilitation process and how instrumental that relationship could and will be at time of discharge. Education was provided to improve both patient and caregiver safety and self-efficacy, therefore improving overall quality of life " individually, and as a unit. Following education, pt's response to education is: verbalizes understanding.      Stroke Education Series    Pt and family participated in skilled Stroke Education Series in an individualized setting to address the topic of Preparing To Go Home. This education was provided in both verbal and written formats. Education within this session focused on providing safety strategies including environmental and lifestyle changes that if made will support a safe discharge to his/her home setting. One goal of this session is to focus on ways to improve the patient's independence while maintaining safety and decreasing fall risk. Following education, pt's response to education is: verbalizes understanding.    To individualize this session, the following topics were reviewed: caregiver considerations, assistive devices, ADL recommendations, family training, and healthcare literacy resources.      Stroke Education Series    Pt and family participated in skilled Stroke Education Series in an individualized setting to address the topic of What Comes Next post stroke in both verbal and written formats. Education within this session included information on recommendations and resources after leaving the [facility]. This education session links together what has been covered in previous stroke education classes to improve the patient's understanding of how managing risk factors for stroke, participating in therapy, working with family and friends in the rehab process, and reviewing their role in the rehab process will maximize outcomes and their functional gains. This education also incorporates what the patient wants to achieve and helps them to set realistic goals. To individualize this education the following topics were covered: continued therapy (home versus outpatient), driving programs, support groups, including [facility], and caregiver support. Following education, pt's response to education is: verbalizes  understanding.      Start Time: 1:35p    End Time: 2:10p

## 2024-11-08 NOTE — ASSESSMENT & PLAN NOTE
Initially with hypertensive emergency  Currently on labetalol 300 mg p.o. twice daily, nifedipine 90 mg p.o. daily, hydralazine 25 mg p.o. every 8  Recommend loop diuretic therapy as needed.

## 2024-11-08 NOTE — CASE MANAGEMENT
Family present for family training, cm able to review team dc date of Monday 11/18 and agreeable to home PT OT, cm to confirm if needing RN as well. Agreeable to [facility] referral, cm sent via [employer]. Awaiting acceptance.     [facility] accepting, added to [facility]ci.

## 2024-11-08 NOTE — PROGRESS NOTES
11/08/24 1030   Pain Assessment   Pain Assessment Tool 0-10   Pain Score No Pain   Restrictions/Precautions   Precautions Bed/chair alarms;Fall Risk;Cognitive   Weight Bearing Restrictions No   ROM Restrictions No   Cognition   Overall Cognitive Status Impaired   Arousal/Participation Alert;Cooperative   Attention Attends with cues to redirect   Orientation Level Oriented X4   Memory Decreased short term memory;Decreased recall of recent events;Decreased recall of precautions   Following Commands Follows one step commands with increased time or repetition   Subjective   Subjective Pt stated he was tired at beginning of session.   Lying to Sitting on Side of Bed   Type of Assistance Needed Physical assistance   Physical Assistance Level 26%-50%   Comment Trialed getting out of bed to patient's R side. Pt needed assistance for trunk lifting to sit EOB.   Lying to Sitting on Side of Bed CARE Score 3   Sit to Stand   Type of Assistance Needed Incidental touching   Physical Assistance Level No physical assistance   Comment CGAx1 no AD   Sit to Stand CARE Score 4   Bed-Chair Transfer   Type of Assistance Needed Incidental touching   Physical Assistance Level No physical assistance   Comment CGx1 no AD   Chair/Bed-to-Chair Transfer CARE Score 4   Transfer Bed/Chair/Wheelchair   Limitations Noted In Balance;Coordination   Adaptive Equipment None   Stand Pivot Contact Guard   Sit to Stand Contact Guard   Stand to Sit Contact Guard   Supine to Sit Moderate Assist   Walk 10 Feet   Type of Assistance Needed Incidental touching   Physical Assistance Level No physical assistance   Comment CGAx1 no AD   Walk 10 Feet CARE Score 4   Walk 50 Feet with Two Turns   Type of Assistance Needed Incidental touching   Physical Assistance Level No physical assistance   Comment CGAx1 no AD   Walk 50 Feet with Two Turns CARE Score 4   Ambulation   Primary Mode of Locomotion Prior to Admission Walk   Distance Walked (feet) 100 ft  (2x35 ft,  2x15 ft, 1x120 ft)   Gait Pattern Slow Lesley;R hemiparesis;R foot drag   Limitations Noted In Balance;Coordination;Endurance   Provided Assistance with: Direction   Walk Assist Level Contact Guard   Findings Orlando demonstrated increased trunk sway with ambulation with gait belt as well as R foot drag and decreased foot clearance with fatigue. He was CGAx1 with most ambulation however required Min-Mod Ax1 with LOB.   Does the patient walk? 2. Yes   Toilet Transfer   Type of Assistance Needed Incidental touching   Physical Assistance Level No physical assistance   Comment CGAx1 no AD   Toilet Transfer CARE Score 4   Toilet Transfer   Surface Assessed Standard Toilet   Limitations Noted In Balance   Findings Orlando was able to sit on the toilet CGAx1 to void, A for balance with pant mgmt.   Therapeutic Interventions   Strengthening Seated TE: LAQ 3# 2x10 Chito, seated marches 3# 2x10 chito, Seaterd TB abduction green TB 3x10, seated HS curl green TB 2x10, seated adduction ball squeeze 2x10   Flexibility Seated DF/HS stretch 2x30 sec Chito   Balance Standing balance with balloon taps 2x1 min bouts, 1x 30 sec  (Orlando had multiple bouts of posterior loss of balance into the chair behind him with Min-Mod Ax1)   Neuromuscular Re-Education Ball kick Chito 2x5 kicks Mod Ax1 for balance with multiple posterior LOB; Alternating step taps on single step 2x10 Chito; Step up L foot and through with R to practice reciprocation and hip flexion 2x5 reps no resistance, 2x5 reps green TB on R ankle.   Assessment   Treatment Assessment Orlando participated in 60 minute skilled PT session consisting of neuromuscular re-education for neuro recovery, ambulation, balance exercises and TE. Orlando had TEDs donned throughout session but no binder, and BP remained stable during the session. Family training was scheduled for this hour, but Orlando's significant other was not present. Orlando had multiple instances with posterior LOB during balance exercises and and  weighshifting ball kicks as well as when returning to his wheelchair after using the bathroom. He required Min-Mod Ax1 for safety to regain balance or sit in the chair. The plan remains to DC home with family the week of 11/18 tenatively. Orlando continues to benefit from skilled physical therapy to work on balance, gait and neuro re-education. Will need to reschedule family training for another date prior to DC.   Problem List Decreased strength;Decreased range of motion;Decreased endurance;Impaired balance;Decreased mobility;Decreased coordination;Decreased cognition;Impaired judgement;Decreased safety awareness;Impaired tone;Obesity;Pain   Plan   Progress Progressing toward goals   PT Therapy Minutes   PT Time In 1030   PT Time Out 1200   PT Total Time (minutes) 90   PT Mode of treatment - Individual (minutes) 90   PT Mode of treatment - Concurrent (minutes) 0   PT Mode of treatment - Group (minutes) 0   PT Mode of treatment - Co-treat (minutes) 0   PT Mode of Treatment - Total time(minutes) 90 minutes   PT Cumulative Minutes 1105   Therapy Time missed   Time missed? No

## 2024-11-08 NOTE — ASSESSMENT & PLAN NOTE
Hgb currently 9.3.  Hemoglobin 12.1 on admission.   Most recent iron panel on 10/15: Iron Sat 38%, TIBC 204, Iron 77, and Ferritin 111.  No need for iron supplementation at this time.  FOBT 2 out of 3 negative.  3rd to be obtained.  Completed 3 doses of IV Venofer per Nephrology recs.  Continue to trend routine CBC.

## 2024-11-08 NOTE — PROGRESS NOTES
Progress Note - Internal Medicine   Name: Tommie Taylor 59 y.o. male I MRN: [id number]  Unit/Bed#: -01 I Date of Admission: 10/26/2024   Date of Service: 11/8/2024 I Hospital Day: 13    Assessment & Plan  CVA (cerebral vascular accident) (HCC)  Initially presented with R sided weakness and R facial droop.   CTH and CTA negative for acute process.   MRI showed left corona radiata stroke.   Received TNK.    Completed 3 weeks of DAPT on 11/6.  Continue with ASA 81mg daily.  Continue statin.    Neurovascular checks Q shift.  Maintain normotension.    Follow up with Neurovascular in 4-6 weeks as outpatient.  Recommending Zio patch/Loop.     PT/OT/ST per primary team.     Complaints of worsening RLE weakness on 11/4 after therapies - CTH stable.  Noted to have orthostasis - recommend obtaining higher BP to prevent symptoms.  Bipolar I disorder, severe, current or most recent episode depressed, with psychotic features ([facility])  Currently on bupropion 100mg BID and olanzapine 10mg at HS with valbenazine tosylate 40mg for tardive dyskinesia per home regimen. Follow up outpatient with psychiatry.   GERD (gastroesophageal reflux disease)  EGD with GI 10/14, directed to continue with Protonix. Tums available PRN for dyspepsia.   Insomnia  Educate and encourage on sleep hygiene. Continue Trazodone at HS for sleep.   DAISY (obstructive sleep apnea)  Continue use of CPAP with home settings.  Does not use CPAP at home - currently broken.  Recommend overnight oximetry prior to discharge.   Follow-up with Sleep Medicine as outpatient.  Anemia, unspecified  Hgb currently 9.3.  Hemoglobin 12.1 on admission.   Most recent iron panel on 10/15: Iron Sat 38%, TIBC 204, Iron 77, and Ferritin 111.  No need for iron supplementation at this time.  FOBT 2 out of 3 negative.  3rd to be obtained.  Completed 3 doses of IV Venofer per Nephrology recs.  Continue to trend routine CBC.  Hyperlipidemia  Most recent lipid panel 10/16/24:  Cholesterol 154, triglycerides 120, HDL 40, LDL 90.  Continue Lipitor 40mg daily, increased from home 20mg daily.  Class 3 severe obesity due to excess calories with serious comorbidity and body mass index (BMI) of 40.0 to 44.9 in adult ([facility])  BMI 42.74 on admission. Encourage lifestyle modifications and provide support for nutritional teaching. Consult placed to nutrition services during acute course.   Tardive dyskinesia  Continue on home treatment Valbenazine Tosylate 40mg HS.   Hypertension  Presented with hypertensive emergency on initial hospitalization.   Concerns about medication non-compliance.  Home regimen: labetalol 100mg BID and amlodipine 10mg daily.  Currently on labetalol 300mg BID, Nifedipine 90mg daily, and hydralazine 10mg Q8 hours.  Nephrology following.   DM2 (diabetes mellitus, type 2) ([facility])  Lab Results   Component Value Date    HGBA1C 8.6 (H) 10/16/2024       Recent Labs     11/07/24  1105 11/07/24  1602 11/07/24  2045 11/08/24  0618   POCGLU 135 89 144* 104       Blood Sugar Average: Last 72 hrs:  (P) [id number]    Had not been taking anything at home.  Ran out of Ozempic a few months ago.  BG has been well controlled.  Continue cons. carb diet and BID accuchecks.    Acute kidney injury superimposed on stage 4 chronic kidney disease ([facility])  Lab Results   Component Value Date    EGFR 17 11/08/2024    EGFR 17 11/06/2024    EGFR 17 11/04/2024    CREATININE 3.68 (H) 11/08/2024    CREATININE 3.65 (H) 11/06/2024    CREATININE 3.66 (H) 11/04/2024     Creatinine currently 3.68.  Baseline creatinine 2.5-2.9.    Avoid nephrotoxins.  Home diuretics currently on hold.  Encourage hydration.  Renal ultrasound demonstrates no hydro but mild bladder wall thickening.   Nephrology following.  Labs MWF per Nephro.  Follows with Dr. No (Nephrology) as outpatient.  Encephalopathy  Lethargy and encephalopathic symptoms noted during acute course.  B12 and head CT WNL. No subsequent episodes noted.   EEG  on 10/22 consistent with moderate nonspecific cerebral dysfunction, no seizure activity.  Continue to monitor behavior and symptoms for signs of changes.   Most recent MoCA was 18/30 in 2024.  Per Psych - if continues to have delirium consider discontinuing Wellbutrin.  Concerns DAISY could also be contributing to encephalopathy per acute care.  Vitamin D deficiency  Vitamin D level <7 on 10/7.  Continue home vitamin D 50,000u weekly.  Continue to follow-up with Nephrology as outpatient.  Orthostatic hypotension  Orthostatic + on .  Continue to monitor orthostatics.  Apply abdominal binder/TEDs as needed.  Encourage PO hydration.    VTE Pharmacologic Prophylaxis:   Pharmacologic: Heparin  Mechanical VTE Prophylaxis in Place: Yes - sequential compression devices.    Current Length of Stay: 13 day(s)    Current Patient Status: Inpatient Rehab     Discharge Plan: As per primary team.    Code Status: Level 1 - Full Code    Subjective:   Pt examined while pt lying in bed in pt room.  Had just completed PT session.  Denies any sensation of lightheadedness or blurred vision or weakness today while in therapy.  Denies any headaches, SOB, palpitations, or CP.  Denies any abdominal pain.  LBM was on .  Slept well last night.  Has no concerns or complaints at this time.    Objective:     Vitals:   Temp (24hrs), Av.3 °F (36.3 °C), Min:96.6 °F (35.9 °C), Max:97.7 °F (36.5 °C)    Temp:  [96.6 °F (35.9 °C)-97.7 °F (36.5 °C)] 97.7 °F (36.5 °C)  HR:  [67-81] 81  Resp:  [20] 20  BP: (138-164)/(70-82) 138/82  SpO2:  [97 %-98 %] 97 %  Body mass index is 42.74 kg/m².     Review of Systems   Constitutional:  Negative for appetite change, chills, fatigue and fever.   HENT:  Negative for trouble swallowing.    Eyes:  Negative for visual disturbance.   Respiratory:  Negative for cough, shortness of breath, wheezing and stridor.    Cardiovascular:  Negative for chest pain, palpitations and leg swelling.   Gastrointestinal:   Negative for abdominal distention, abdominal pain, constipation, diarrhea, nausea and vomiting.        LBM 11/6   Genitourinary:  Negative for difficulty urinating.   Musculoskeletal:  Negative for arthralgias, back pain and gait problem.   Neurological:  Positive for weakness (R sided weakness since stroke). Negative for dizziness, light-headedness, numbness and headaches.   Psychiatric/Behavioral:  Negative for dysphoric mood and sleep disturbance. The patient is not nervous/anxious.    All other systems reviewed and are negative.       Input and Output Summary (last 24 hours):       Intake/Output Summary (Last 24 hours) at 11/8/2024 0846  Last data filed at 11/8/2024 0823  Gross per 24 hour   Intake 1360 ml   Output 1850 ml   Net -490 ml       Physical Exam:     Physical Exam  Vitals and nursing note reviewed.   Constitutional:       General: He is not in acute distress.     Appearance: Normal appearance. He is obese. He is not ill-appearing.   HENT:      Head: Normocephalic and atraumatic.   Cardiovascular:      Rate and Rhythm: Normal rate and regular rhythm.      Pulses: Normal pulses.      Heart sounds: Normal heart sounds. No murmur heard.     No friction rub.   Pulmonary:      Effort: Pulmonary effort is normal. No respiratory distress.      Breath sounds: Normal breath sounds. No wheezing or rhonchi.   Abdominal:      General: Abdomen is flat. Bowel sounds are normal. There is no distension.      Palpations: Abdomen is soft. There is no mass.      Tenderness: There is no abdominal tenderness. There is no guarding or rebound.      Hernia: No hernia is present.   Musculoskeletal:      Cervical back: Normal range of motion and neck supple. No tenderness.      Right lower leg: Edema (+1 pitting edema) present.      Left lower leg: Edema (+1 pitting edema) present.   Skin:     General: Skin is warm and dry.   Neurological:      Mental Status: He is alert and oriented to person, place, and time.      Cranial  Nerves: Dysarthria and facial asymmetry present.      Motor: Weakness (R sided weakness) present.   Psychiatric:         Mood and Affect: Mood normal.         Behavior: Behavior normal.         Additional Data:     Labs:    Results from last 7 days   Lab Units 11/06/24  0447   WBC Thousand/uL 5.16   HEMOGLOBIN g/dL 9.3*   HEMATOCRIT % 27.1*   PLATELETS Thousands/uL 170   SEGS PCT % 51   LYMPHO PCT % 30   MONO PCT % 13*   EOS PCT % 5     Results from last 7 days   Lab Units 11/08/24  0546   SODIUM mmol/L 138   POTASSIUM mmol/L 4.7   CHLORIDE mmol/L 108   CO2 mmol/L 23   BUN mg/dL 36*   CREATININE mg/dL 3.68*   ANION GAP mmol/L 7   CALCIUM mg/dL 8.8   GLUCOSE RANDOM mg/dL 114         Results from last 7 days   Lab Units 11/08/24  0618 11/07/24  2045 11/07/24  1602 11/07/24  1105 11/07/24  0536 11/06/24  1605 11/06/24  0556 11/05/24  2042 11/05/24  1600 11/05/24  1113 11/05/24  0552 11/04/24  2100   POC GLUCOSE mg/dl 104 144* 89 135 105 117 106 119 100 111 88 106               Labs reviewed    Imaging:    Imaging reviewed    Recent Cultures (last 7 days):           Last 24 Hours Medication List:   Current Facility-Administered Medications   Medication Dose Route Frequency Provider Last Rate    acetaminophen  650 mg Oral Q6H PRN Alex S George, DO      aspirin  81 mg Oral Daily Alex S George, DO      atorvastatin  40 mg Oral QPM Alex S George, DO      buPROPion  100 mg Oral BID Alex S George, DO      calcium carbonate  1,000 mg Oral BID PRN Alex S George, DO      docusate sodium  100 mg Oral BID Cathy Miranda MD      fluticasone  1 spray Each Nare Daily SAMANTHA Kern      heparin (porcine)  7,500 Units Subcutaneous Q8H MIGEL Alex S George, DO      hydrALAZINE  10 mg Oral Q8H [facility] SAMANTHA Kern      labetalol  300 mg Oral BID Alex S George, DO      lactulose  20 g Oral TID PRN Cathy Miranda MD      loratadine  5 mg Oral Daily Joe Brittany Garcia, CRNP      melatonin  3 mg Oral HS Joe Soto  SAMANTHA Garcia      NIFEdipine  90 mg Oral Daily Alexrobert Geogre, DO      OLANZapine  10 mg Oral HS Alex George, DO      ondansetron  4 mg Oral Q6H PRN SAMANTHA Kern      pantoprazole  40 mg Oral Early Morning Alexrobert George, DO      polyethylene glycol  17 g Oral Daily Cathy Miranda MD      senna  2 tablet Oral HS Cathy Miranda MD      traZODone  50 mg Oral HS PRN Alex George, DO      Valbenazine Tosylate  40 mg Oral HS Alex George, DO          M*Modal software was used to dictate this note.  It may contain errors with dictating incorrect words or incorrect spelling. Please contact the provider directly with any questions.

## 2024-11-08 NOTE — PROGRESS NOTES
11/08/24 0900   Pain Assessment   Pain Assessment Tool 0-10  (Simultaneous filing. User may not have seen previous data.)   Pain Score No Pain  (Simultaneous filing. User may not have seen previous data.)   Restrictions/Precautions   Precautions Fall Risk;Bed/chair alarms;Cognitive   Weight Bearing Restrictions No   ROM Restrictions No   General   Change In Medical/Functional Status Pt BP was 170/77 upon entry taken by nursing. Pt had TEDs donned. Binder was deferred due to high BP.   Cognition   Overall Cognitive Status Impaired   Arousal/Participation Alert;Cooperative   Attention Attends with cues to redirect   Orientation Level Oriented X4  (Simultaneous filing. User may not have seen previous data.)   Memory Decreased short term memory;Decreased recall of recent events;Decreased recall of precautions   Following Commands Follows one step commands with increased time or repetition   Subjective   Subjective Pt stated he fell asleep early and was ready for therapy today.   Sit to Lying   Type of Assistance Needed Physical assistance   Physical Assistance Level 26%-50%   Comment For BLE management to get into bed to pts R side first, pt reports he may be getting in/out on the other side (OOB to his R) at home with bed setup downstairs.   Sit to Lying CARE Score 3   Sit to Stand   Type of Assistance Needed Incidental touching   Physical Assistance Level No physical assistance   Comment CGAx1 no AD   Sit to Stand CARE Score 4   Bed-Chair Transfer   Type of Assistance Needed Incidental touching   Physical Assistance Level No physical assistance   Comment CGAx1 no AD   Chair/Bed-to-Chair Transfer CARE Score 4   Transfer Bed/Chair/Wheelchair   Limitations Noted In Balance;Coordination   Adaptive Equipment None   Stand Pivot Contact Guard   Sit to Stand Contact Guard   Stand to Sit Contact Guard   Sit to Supine Moderate Assist   Walk 10 Feet   Type of Assistance Needed Incidental touching   Physical Assistance Level  No physical assistance   Comment CGAx1 no AD   Walk 10 Feet CARE Score 4   Walk 50 Feet with Two Turns   Type of Assistance Needed Incidental touching   Physical Assistance Level No physical assistance   Comment CGAx1 no AD   Walk 50 Feet with Two Turns CARE Score 4   Walk 150 Feet   Type of Assistance Needed Physical assistance   Physical Assistance Level 25% or less   Comment CGAx1 no AD but Min Ax1 to recover from LOB   Walk 150 Feet CARE Score 3   Ambulation   Primary Mode of Locomotion Prior to Admission Walk   Distance Walked (feet) 150 ft  (1x85 ft, 1x95 ft, 1x80ft)   Gait Pattern Slow Lesley;R foot drag;R hemiparesis   Limitations Noted In Balance;Coordination;Endurance;Heel Strike;Speed;Swing;Safety   Provided Assistance with: Balance   Walk Assist Level Contact Guard;Minimum Assist  (Min A with LOB)   Findings Orlando displays increased trunk sway with ambulation and decreased swing of the R LE. He was CGAx1 for ambulation, however did have a few instances of LOB which required Min Ax1 to recover.   Does the patient walk? 2. Yes   Assessment   Treatment Assessment Orlando participated in a 30 minute skilled PT session today consisting of gait training. Orlando tolerated treatment well today as evidenced by no increased reports of pain. Orlando's blood pressure was high this morning so binder was not donned for activity, however he was wearing TEDs throughout the session. Orlando will be participating in family training later today in preparation for DC home with family. Orlando continues to benefit from skilled physical therapy to address gait deficits, balance and strength to return home with functional independence. Orlando would benefit from trialing item  to assess safety and continued gait training and  neuro re-ed for neuro recovery.   Problem List Decreased strength;Decreased range of motion;Decreased endurance;Impaired balance;Decreased mobility;Decreased coordination;Decreased cognition;Impaired  judgement;Decreased safety awareness;Impaired tone;Obesity;Pain   Plan   Progress Progressing toward goals   PT Therapy Minutes   PT Time In 0900   PT Time Out 0930   PT Total Time (minutes) 30   PT Mode of treatment - Individual (minutes) 30   PT Mode of treatment - Concurrent (minutes) 0   PT Mode of treatment - Group (minutes) 0   PT Mode of treatment - Co-treat (minutes) 0   PT Mode of Treatment - Total time(minutes) 30 minutes   PT Cumulative Minutes 1015   Therapy Time missed   Time missed? No

## 2024-11-08 NOTE — ASSESSMENT & PLAN NOTE
ROSINA thought to be secondary to IAN 10/14/2024 + ischemic injury secondary to severe hemodynamic perturbations plus some component of prerenal azotemia with subsequent ATN.  Creatinine appears to have plateaued at approximately 3.5-3.7.  Current EGFR of 17.  Likely represents new baseline given previous advanced CKD with previous baseline creatinine near 3.0.    Renal function stable with a creatinine of 3.68.    Noted nephrotic range proteinuria underlying disease most likely secondary to diabetic kidney disease.    Unfortunately patient had fairly high risk for progression towards end-stage disease within the next 3 to 6 months.

## 2024-11-08 NOTE — ASSESSMENT & PLAN NOTE
Lab Results   Component Value Date    HGBA1C 8.6 (H) 10/16/2024       Recent Labs     11/07/24  1105 11/07/24  1602 11/07/24 2045 11/08/24  0618   POCGLU 135 89 144* 104       Blood Sugar Average: Last 72 hrs:  (P) 110.9390303582409545    Had not been taking anything at home.  Ran out of Ozempic a few months ago.  BG has been well controlled.  Continue cons. carb diet and BID accuchecks.

## 2024-11-08 NOTE — ASSESSMENT & PLAN NOTE
Patient with uncontrolled hypertension and diabetes presents with right upper and right lower extremity weakness as well as facial droop  A CT of the head as well as a CTA of the head and neck were completed with negative for acute abnormalities for an acute process  TNK had been administered following the correction of his hypertension after being placed on a Cardene drip  After ministration of the TNK developed worsening dysarthria as well as headache and a repeat CT was still negative.  The 24 post TNK CT was also negative  Neurology followed the patient and MRI was completed and was significant for left corona radiata stroke  Placed on dual antiplatelet therapy for 3 weeks with plans for monotherapy with aspirin and statin indefinitely for secondary stroke prophylaxis (10/15-11/5) started on ASA and statin monotherapy   Recommendation for a Zio patch as an outpatient  Follow-up with neurovascular attending in 6 to 8 weeks  Physical, occupational and speech therapy while on the acute rehabilitation unit  Episode of worsening RLE weakness,CTH 11/4 no acute changes.

## 2024-11-08 NOTE — PROGRESS NOTES
NEPHROLOGY HOSPITAL PROGRESS NOTE   Tommie Taylor 59 y.o. male MRN: [id number]  Unit/Bed#: -01 Encounter: [id number]  Reason for Consult: ROSINA    Assessment & Plan  Acute kidney injury superimposed on stage 4 chronic kidney disease (HCC)    ROSINA thought to be secondary to IAN 10/14/2024 + ischemic injury secondary to severe hemodynamic perturbations plus some component of prerenal azotemia with subsequent ATN.  Creatinine appears to have plateaued at approximately 3.5-3.7.  Current EGFR of 17.  Likely represents new baseline given previous advanced CKD with previous baseline creatinine near 3.0.    Renal function stable with a creatinine of 3.68.    Noted nephrotic range proteinuria underlying disease most likely secondary to diabetic kidney disease.    Unfortunately patient had fairly high risk for progression towards end-stage disease within the next 3 to 6 months.  CVA (cerebral vascular accident) (HCC)    Status post TNK administration  Management as per primary service and neurology.  Bipolar I disorder, severe, current or most recent episode depressed, with psychotic features ([facility])  Follow-up with psych  Currently on Zyprexa and Wellbutrin  Anemia, unspecified  Recent hemoglobin 9.3.  Stool for occult blood negative  SPEP negative for monoclonal gammopathy  Iron saturation 38% with a ferritin of 111.  Continue with Venofer.  KOBI relatively contraindicated given recent CVA.    Hypertension  Initially with hypertensive emergency  Currently on labetalol 300 mg p.o. twice daily, nifedipine 90 mg p.o. daily, hydralazine 25 mg p.o. every 8  Recommend loop diuretic therapy as needed.  Hyperlipidemia  Continue on atorvastatin  Goal LDL less than 70  Orthostatic hypotension  Mild orthostasis noted.  No changes in his current regimen.  Chronic kidney disease-mineral and bone disorder (CKD-MBD)  Phosphorus stable 4.5 with a calcium of 8.8.    Summary:  Overall renal function remains fairly stable to creatinine of  3.68  Volume status appears stable, recommend loop diuretic therapy as needed  No changes in his antihypertensive regimen  Recommend continuing to follow laboratory studies 2 times per week.  Discussed with primary service.      SUBJECTIVE / 24H INTERVAL HISTORY:  Seen and examined.  Patient awake and alert.  Offers no new complaints.  Currently participating in physical therapy.  Does not appear short of breath.  No active chest pain.  Denies abdominal pain.  His appetite has been stable.    OBJECTIVE:  Current Weight: Weight - Scale: 120 kg (264 lb 12.8 oz)  Vitals:    11/08/24 1035 11/08/24 1040 11/08/24 1105 11/08/24 1155   BP: 137/70 131/74 153/84 136/74   BP Location: Left arm Left arm Left arm Left arm   Pulse:       Resp:       Temp:       TempSrc:       SpO2:       Weight:       Height:           Intake/Output Summary (Last 24 hours) at 11/8/2024 1353  Last data filed at 11/8/2024 0823  Gross per 24 hour   Intake 760 ml   Output 1850 ml   Net -1090 ml       Physical Exam  Constitutional:       Appearance: He is not ill-appearing.   Eyes:      General: No scleral icterus.  Cardiovascular:      Rate and Rhythm: Normal rate and regular rhythm.   Pulmonary:      Effort: Pulmonary effort is normal.      Breath sounds: Normal breath sounds.   Abdominal:      General: There is no distension.      Palpations: Abdomen is soft.      Tenderness: There is no abdominal tenderness.   Musculoskeletal:      Right lower leg: No edema.      Left lower leg: No edema.   Skin:     General: Skin is warm and dry.      Findings: No rash.   Neurological:      Mental Status: He is alert and oriented to person, place, and time.         Medications:    Current Facility-Administered Medications:     acetaminophen (TYLENOL) tablet 650 mg, 650 mg, Oral, Q6H PRN, Alex George DO, 650 mg at 11/07/24 0737    aspirin (ECOTRIN LOW STRENGTH) EC tablet 81 mg, 81 mg, Oral, Daily, Alex George DO, 81 mg at 11/08/24 0907    atorvastatin  (LIPITOR) tablet 40 mg, 40 mg, Oral, QPM, Alex George DO, 40 mg at 11/07/24 1716    buPROPion (WELLBUTRIN) tablet 100 mg, 100 mg, Oral, BID, Alex George DO, 100 mg at 11/08/24 0907    calcium carbonate (TUMS) chewable tablet 1,000 mg, 1,000 mg, Oral, BID PRN, Alex George DO    docusate sodium (COLACE) capsule 100 mg, 100 mg, Oral, BID, Cathy Miranda MD, 100 mg at 11/08/24 0907    fluticasone (FLONASE) 50 mcg/act nasal spray 1 spray, 1 spray, Each Nare, Daily, SAMANTHA Kern, 1 spray at 11/08/24 0907    heparin (porcine) subcutaneous injection 7,500 Units, 7,500 Units, Subcutaneous, Q8H MIGEL, Alex George DO, 7,500 Units at 11/08/24 0549    hydrALAZINE (APRESOLINE) tablet 10 mg, 10 mg, Oral, Q8H MIGEL, SAMANTHA Kern, 10 mg at 11/08/24 0549    labetalol (NORMODYNE) tablet 300 mg, 300 mg, Oral, BID, Alex George DO, 300 mg at 11/08/24 0907    lactulose (CHRONULAC) oral solution 20 g, 20 g, Oral, TID PRN, Cathy Miranda MD, 20 g at 11/04/24 1146    loratadine (CLARITIN) tablet 5 mg, 5 mg, Oral, Daily, SAMANTHA Kern, 5 mg at 11/08/24 0907    melatonin tablet 3 mg, 3 mg, Oral, HS, SAMANTHA Kern, 3 mg at 11/07/24 2111    NIFEdipine (PROCARDIA XL) 24 hr tablet 90 mg, 90 mg, Oral, Daily, Alex George DO, 90 mg at 11/08/24 0907    OLANZapine (ZyPREXA) tablet 10 mg, 10 mg, Oral, HS, Alex George DO, 10 mg at 11/07/24 2111    ondansetron (ZOFRAN-ODT) dispersible tablet 4 mg, 4 mg, Oral, Q6H PRN, SAMANTHA Kern    pantoprazole (PROTONIX) EC tablet 40 mg, 40 mg, Oral, Early Morning, Alex George DO, 40 mg at 11/08/24 0549    polyethylene glycol (MIRALAX) packet 17 g, 17 g, Oral, Daily, Cathy Miranda MD, 17 g at 11/08/24 0907    senna (SENOKOT) tablet 17.2 mg, 2 tablet, Oral, HS, Cathy Miranda MD, 17.2 mg at 11/07/24 2111    traZODone (DESYREL) tablet 50 mg, 50 mg, Oral, HS PRN, Alex George DO, 50 mg at 11/07/24 2111    Valbenazine Tosylate CAPS  "40 mg, 40 mg, Oral, HS, Alex George, DO, 40 mg at 11/07/24 2110    Laboratory Results:  Results from last 7 days   Lab Units 11/08/24  0546 11/06/24  0447 11/04/24  0504   WBC Thousand/uL  --  5.16 6.41   HEMOGLOBIN g/dL  --  9.3* 9.1*   HEMATOCRIT %  --  27.1* 28.3*   PLATELETS Thousands/uL  --  170 167   POTASSIUM mmol/L 4.7 4.5 4.8   CHLORIDE mmol/L 108 108 108   CO2 mmol/L 23 22 24   BUN mg/dL 36* 31* 29*   CREATININE mg/dL 3.68* 3.65* 3.66*   CALCIUM mg/dL 8.8 8.5 8.4   MAGNESIUM mg/dL  --   --  1.5*   PHOSPHORUS mg/dL 4.5 4.9* 4.0       Portions of the record may have been created with voice recognition software. Occasional wrong word or \"sound a like\" substitutions may have occurred due to the inherent limitations of voice recognition software. Read the chart carefully and recognize, using context, where substitutions have occurred.If you have any questions, please contact the dictating provider.  "

## 2024-11-08 NOTE — PROGRESS NOTES
Progress Note - PMR   Name: Tommie Taylor 59 y.o. male I MRN: [id number]  Unit/Bed#: [facility] 261-01 I Date of Admission: 10/26/2024   Date of Service: 11/8/2024 I Hospital Day: 13     Assessment & Plan  CVA (cerebral vascular accident) ([facility], located within [facility])  Patient with uncontrolled hypertension and diabetes presents with right upper and right lower extremity weakness as well as facial droop  A CT of the head as well as a CTA of the head and neck were completed with negative for acute abnormalities for an acute process  TNK had been administered following the correction of his hypertension after being placed on a Cardene drip  After ministration of the TNK developed worsening dysarthria as well as headache and a repeat CT was still negative.  The 24 post TNK CT was also negative  Neurology followed the patient and MRI was completed and was significant for left corona radiata stroke  Placed on dual antiplatelet therapy for 3 weeks with plans for monotherapy with aspirin and statin indefinitely for secondary stroke prophylaxis (10/15-11/5) started on ASA and statin monotherapy   Recommendation for a Zio patch as an outpatient  Follow-up with neurovascular attending in 6 to 8 weeks  Physical, occupational and speech therapy while on the acute rehabilitation unit  Episode of worsening RLE weakness,CT 11/4 no acute changes.   Bipolar I disorder, severe, current or most recent episode depressed, with psychotic features ([facility])  History of chronic bipolar 1 disorder and follows with outpatient psychiatry and was seen inpatient  Mood has been stable and is maintained on Zyprexa, bupropion and trazodone as well as Ingrezza for tardive dyskinesia  Follow-up with psychiatry as an outpatient and consider virtual consultation if indicated for any changes while on ARC  GERD (gastroesophageal reflux disease)  Continue Protonix as well as as needed Tums  Insomnia  Continue trazodone and consider sleep logs  Encourage use of CPAP   DAISY (obstructive  sleep apnea)  Continue CPAP with home settings  Ordered and compliant per records  Anemia, unspecified  Hemoglobin most recently of 10.4 which is up from 9.3 but has been hovering in the 10-11 range for most of admission  FOBT: neg x3 10/29,11/1,11/4  Started on venofer per nephrology x3 completed   Continue to monitor with biweekly CBC or sooner if clinically indicated  Hyperlipidemia  Continue Lipitor 40 mg every evening  Class 3 severe obesity due to excess calories with serious comorbidity and body mass index (BMI) of 40.0 to 44.9 in adult ([facility])  Continue with exercise and promote lifestyle modification, weight loss counseling and management of medical conditions to optimize status  Tardive dyskinesia  Continue Ingrezza 40 mg at bedtime  Hypertension  Presented to the hospital significantly elevated blood pressure with systolic ranging from 197-231  Was placed on a Cardizem drip initially for hypertensive emergency  Had been on Demadex 20 mg daily but was on hold due to the increasing creatinine  Cw labetalol 300 mg BID , nifedipine 90 mg daily and hydralazine 25mg Q8h- hydral dec to 10 mg Q8h,   Goals for normotension  Mgmt per IM  Follow-up with nephrology as an outpatient  DM2 (diabetes mellitus, type 2) ([facility])  Lab Results   Component Value Date    HGBA1C 8.6 (H) 10/16/2024       Recent Labs     11/07/24  1105 11/07/24  1602 11/07/24  2045 11/08/24  0618   POCGLU 135 89 144* 104     Most recent hemoglobin A1c of 8.6 and technically uncontrolled although blood sugars in the hospital have been well-controlled  Currently on semaglutide as an outpatient but was on hold due to ROSINA in the hospital  Continue sliding scale and 4 times daily Accu-Cheks and as well as a diabetic diet    Acute kidney injury superimposed on stage 4 chronic kidney disease ([facility])  Lab Results   Component Value Date    EGFR 17 11/08/2024    EGFR 17 11/06/2024    EGFR 17 11/04/2024    CREATININE 3.68 (H) 11/08/2024    CREATININE 3.65 (H)  11/06/2024    CREATININE 3.66 (H) 11/04/2024     Recent creatinine of 3.63 10/30 Prior baseline around 2.9-3.0  Etiology felt to be related potentially to ATN in the setting of uncontrolled hypertension and complicated by contrast associated nephropathy  Nephrology was consulted and followed and a UA showed microhematuria and proteinuria.  An ultrasound of the kidney/bladder showed wall thickening but no hydronephrosis  Bladder scans negative for retention  Monitor BMP triweekly or sooner if clinically indicated  Demadex has been on hold and had periodically required IV fluids  Nephrology following  Follow with nephrology as an outpatient or sooner on the ARC if any acute changes  Encephalopathy  Had lethargy on exam back on 10/17 in acute care with improvements however more recently had issues with confusion and decreased consciousness in the mornings that improves throughout the day with unclear etiology  Prior history of cognitive impairments with last MoCA score of 18  CT of the head was stable, B12 level (446) wnl  Was evaluated by psychiatry with no changes in medications recommended  EEG showed no epileptiform activity just moderate nonspecific dysfunction  Will need to monitor especially with change in environment now on the ARC for any changes  Impaired mobility and activities of daily living  Patient was evaluated by the rehabilitation team MD and an appropriate candidate for acute inpatient rehabilitation program at this time.  The patient will tolerate 3 hours/day 5 to 7 days/week of intensive physical, occupational and speech therapy in order to obtain goals for community discharge  Due to the patient's functional Compared to their baseline level of function in addition to their ongoing medical needs, the patient would benefit from daily supervision from a rehabilitation physician as well as rehabilitation nursing to implement and adjust the medical as well as functional plan of care in order to meet  the patient's goals.  DC: 11/18 homecare PT/OT/SLP/RN/HHA  At risk for alteration in bowel function  Constipated no bm for several days; lactulose PRN  Start colase, senna daily and miralax prn  Titrate as needed   Overgrown toenails  Cs podiatry   Vitamin D deficiency  <7 10/7  Mgmt per IM/nephrology   Fall during current hospitalization  Patient with fall to knees at shift change 10/31   Denied any knee pain  Get larger bariatric chair   Fall precautions.   Orthostatic hypotension  Noted to have orthostatic hypotension during therapy   Apply TEDs/Binder if available for patient size.   Fall precautions  Monitor anemia as above   BP medication titration per IM     History of Present Illness   Tommie Taylor is a 59 y.o. male with history of bipolar disorder, CKD stage IV, type 2 diabetes, hypertension, tardive dyskinesia, sleep apnea, GERD who presented to the [facility] on 10/14/2024 for right-sided weakness and facial droop.  MRI revealed a left corona radiata infarct and neurology was consulted and was administered TNK.  He initially required a Cardene drip for his hypertensive emergency and was eventually placed on dual antiplatelet therapy with plan to continue aspirin and Plavix until that date and continue with aspirin as monotherapy as well as atorvastatin for secondary stroke prophylaxis.  He was recommended for Zio patch placement as an outpatient.  His hospital course was complicated by acute kidney injury on top of his CKD thought to be related to ATN in the setting of uncontrolled blood pressure as well as contrast associated nephropathy.  Nephrology did follow during his course and had some levels of improvement however has not returned back to his baseline level of creatinine.  Additionally there was some levels of encephalopathy which are stable at this time. The patient was evaluated by the Rehabilitation team and deemed an appropriate candidate for comprehensive  inpatient rehabilitation and admitted to the [facility] on 10/26/2024  5:13 PM     Rehab Diagnosis: Impairment of mobility, safety, Activities of Daily Living (ADLs), and cognitive/communication skills due to Stroke:  01.2  Right Body Involvement (Left Brain)  Etiologic Dx: Left Corona Radiata Infarct  Date of Onset: 10/14/2024   Date of surgery: N/A  Chief Complaint: f/u stroke    Interval: Patient seen and examined in wheelchair. No events overnight.  Reports overall feeling well. Last BM 11/6. Had a better nights sleep. Denied any more episodes of spotty vision or headaches. Denies any f/c/n/v, CP, SOB, abdominal pain, constipation, or diarrhea.       Objective   Functional Update:  Physical Therapy Occupational Therapy Speech Therapy   Weight Bearing Status: Full Weight Bearing  Transfers: Minimal Assistance  Bed Mobility: Incidental Touching  Amulation Distance (ft): 150 feet  Ambulation: Moderate Assistance, Incidental Touching, Minimal Assistance (Pt progressing but fluctuates, also BP has been dropping)  Assistive Device for Ambulation:  (no device)  Wheelchair Mobility Distance: 50 ft  Wheelchair Mobility: Minimal Assistance  Number of Stairs: 12  Assistive Device for Stairs: Lehft Hand Rail  Stair Assistance: Minimal Assistance  Ramp: Minimal Assistance  Discharge Recommendations: Home with:  DC Home with:: Family Support, 24 Hour Supervision   Eating: Supervision (set-up)  Grooming: Supervision  Bathing: Moderate Assistance  Bathing: Moderate Assistance  Upper Body Dressing: Minimal Assistance  Lower Body Dressing: Minimal Assistance (footwear mod A)  Toileting: Moderate Assistance (intermittent incontinence of urine, having BM infrequently whihc he reports is baseline)  Tub/Shower Transfer: Minimal Assistance  Toilet Transfer: Minimal Assistance  Cognition: Exceptions to WNL  Cognition: Decreased Memory, Decreased Executive Functions, Decreased Attention, Decreased Safety, Decreased Comprehension  Orientation:  Person, Place, Time, Situation   Mode of Communication: Verbal  Speech/Language: Dysarthia  Cognition: Exceptions to WNL  Cognition: Decreased Memory, Decreased Executive Functions, Decreased Attention, Decreased Comprehension, Decreased Safety  Orientation: Person, Place, Time, Situation  Discharge Recommendations: Home with:  DC Home with:: 24 Hour Supervision, 24 Hour Assisteance, Family Support, Home Speech Therapy, Outpatient Speech Therapy         Temp:  [96.6 °F (35.9 °C)-97.7 °F (36.5 °C)] 97.7 °F (36.5 °C)  HR:  [67-81] 71  Resp:  [20] 20  BP: (138-170)/(70-82) 170/77  SpO2:  [97 %-98 %] 97 %    Physical Exam    Gen: No acute distress, obese   HEENT: Moist mucus membranes, Normocephalic/Atraumatic  Cardiovascular: Regular rate, rhythm,  Heme/Extr: mild bilateral LE edema   Pulmonary: Non-labored breathing. Lungs CTAB  : No amezquita  GI:  non-distended. BS+  MSK: RUE>RLE weakness, 2/5 elbow flexion, 1/5 elbow extension, 1/5 finger flexion.   Integumentary: Skin is warm, dry. .  Neuro: Speech is intact dysarthric. Appropriate to questioning. Rfacial droop  Psych: Normal mood and affect.       Scheduled Meds:  Current Facility-Administered Medications   Medication Dose Route Frequency Provider Last Rate    acetaminophen  650 mg Oral Q6H PRN Alex S George, DO      aspirin  81 mg Oral Daily Alex S George, DO      atorvastatin  40 mg Oral QPM Alex S George, DO      buPROPion  100 mg Oral BID Alex S George, DO      calcium carbonate  1,000 mg Oral BID PRN Alex S George, DO      docusate sodium  100 mg Oral BID Cathy Miranda MD      fluticasone  1 spray Each Nare Daily SAMANTHA Kern      heparin (porcine)  7,500 Units Subcutaneous Q8H [facility] Alex S George, DO      hydrALAZINE  10 mg Oral Q8H [facility] SAMANTHA Kern      labetalol  300 mg Oral BID Alex S George, DO      lactulose  20 g Oral TID PRN Cathy Miranda MD      loratadine  5 mg Oral Daily SAMANTHA Kern      melatonin  3 mg  Oral HS SAMANTHA Kern      NIFEdipine  90 mg Oral Daily Alex S George, DO      OLANZapine  10 mg Oral HS Alex S George, DO      ondansetron  4 mg Oral Q6H PRN SAMANTHA Kern      pantoprazole  40 mg Oral Early Morning Alex S George, DO      polyethylene glycol  17 g Oral Daily Cathy Miranda MD      senna  2 tablet Oral HS Cathy Miranda MD      traZODone  50 mg Oral HS PRN Alex S George, DO      Valbenazine Tosylate  40 mg Oral HS Alex S George, DO           Lab Results: I have reviewed the following results:  Results from last 7 days   Lab Units 11/06/24  0447 11/04/24  0504   HEMOGLOBIN g/dL 9.3* 9.1*   HEMATOCRIT % 27.1* 28.3*   WBC Thousand/uL 5.16 6.41   PLATELETS Thousands/uL 170 167     Results from last 7 days   Lab Units 11/08/24  0546 11/06/24  0447 11/04/24  0504   BUN mg/dL 36* 31* 29*   SODIUM mmol/L 138 139 138   POTASSIUM mmol/L 4.7 4.5 4.8   CHLORIDE mmol/L 108 108 108   CREATININE mg/dL 3.68* 3.65* 3.66*              Cathy Miranda MD   Physical Medicine and Rehabilitation   11/08/24    I have spent a total time of 37 minutes in caring for this patient on the day of the visit/encounter including Patient and family education, Counseling / Coordination of care, Documenting in the medical record, and Communicating with other healthcare professionals .

## 2024-11-09 PROBLEM — N18.9 CHRONIC KIDNEY DISEASE: Status: ACTIVE | Noted: 2024-11-09

## 2024-11-09 LAB
GLUCOSE SERPL-MCNC: 131 MG/DL (ref 65–140)
GLUCOSE SERPL-MCNC: 97 MG/DL (ref 65–140)

## 2024-11-09 PROCEDURE — 94660 CPAP INITIATION&MGMT: CPT

## 2024-11-09 PROCEDURE — 97535 SELF CARE MNGMENT TRAINING: CPT

## 2024-11-09 PROCEDURE — 97530 THERAPEUTIC ACTIVITIES: CPT

## 2024-11-09 PROCEDURE — 97116 GAIT TRAINING THERAPY: CPT

## 2024-11-09 PROCEDURE — 97112 NEUROMUSCULAR REEDUCATION: CPT

## 2024-11-09 PROCEDURE — 99232 SBSQ HOSP IP/OBS MODERATE 35: CPT | Performed by: INTERNAL MEDICINE

## 2024-11-09 PROCEDURE — 82948 REAGENT STRIP/BLOOD GLUCOSE: CPT

## 2024-11-09 RX ADMIN — DOCUSATE SODIUM 100 MG: 100 CAPSULE, LIQUID FILLED ORAL at 17:35

## 2024-11-09 RX ADMIN — VALBENAZINE 40 MG: 40 CAPSULE ORAL at 21:28

## 2024-11-09 RX ADMIN — HEPARIN SODIUM 7500 UNITS: 5000 INJECTION INTRAVENOUS; SUBCUTANEOUS at 21:17

## 2024-11-09 RX ADMIN — LORATADINE 5 MG: 10 TABLET ORAL at 09:00

## 2024-11-09 RX ADMIN — NIFEDIPINE 90 MG: 30 TABLET, FILM COATED, EXTENDED RELEASE ORAL at 09:01

## 2024-11-09 RX ADMIN — PANTOPRAZOLE SODIUM 40 MG: 40 TABLET, DELAYED RELEASE ORAL at 05:53

## 2024-11-09 RX ADMIN — ATORVASTATIN CALCIUM 40 MG: 40 TABLET, FILM COATED ORAL at 17:35

## 2024-11-09 RX ADMIN — BUPROPION HYDROCHLORIDE 100 MG: 100 TABLET, FILM COATED ORAL at 17:35

## 2024-11-09 RX ADMIN — OLANZAPINE 10 MG: 2.5 TABLET, FILM COATED ORAL at 21:19

## 2024-11-09 RX ADMIN — ASPIRIN 81 MG: 81 TABLET, COATED ORAL at 09:00

## 2024-11-09 RX ADMIN — POLYETHYLENE GLYCOL 3350 17 G: 17 POWDER, FOR SOLUTION ORAL at 09:01

## 2024-11-09 RX ADMIN — HYDRALAZINE HYDROCHLORIDE 10 MG: 10 TABLET ORAL at 21:18

## 2024-11-09 RX ADMIN — FLUTICASONE PROPIONATE 1 SPRAY: 50 SPRAY, METERED NASAL at 09:01

## 2024-11-09 RX ADMIN — MELATONIN TAB 3 MG 3 MG: 3 TAB at 21:20

## 2024-11-09 RX ADMIN — LABETALOL HYDROCHLORIDE 300 MG: 100 TABLET, FILM COATED ORAL at 09:01

## 2024-11-09 RX ADMIN — BUPROPION HYDROCHLORIDE 100 MG: 100 TABLET, FILM COATED ORAL at 09:01

## 2024-11-09 RX ADMIN — HYDRALAZINE HYDROCHLORIDE 10 MG: 10 TABLET ORAL at 05:53

## 2024-11-09 RX ADMIN — HEPARIN SODIUM 7500 UNITS: 5000 INJECTION INTRAVENOUS; SUBCUTANEOUS at 05:53

## 2024-11-09 RX ADMIN — HYDRALAZINE HYDROCHLORIDE 10 MG: 10 TABLET ORAL at 14:08

## 2024-11-09 RX ADMIN — LABETALOL HYDROCHLORIDE 300 MG: 100 TABLET, FILM COATED ORAL at 21:20

## 2024-11-09 RX ADMIN — HEPARIN SODIUM 7500 UNITS: 5000 INJECTION INTRAVENOUS; SUBCUTANEOUS at 14:08

## 2024-11-09 RX ADMIN — DOCUSATE SODIUM 100 MG: 100 CAPSULE, LIQUID FILLED ORAL at 09:01

## 2024-11-09 NOTE — PLAN OF CARE
Problem: GENITOURINARY - ADULT  Goal: Maintains or returns to baseline urinary function  Description: INTERVENTIONS:  - Assess urinary function  - Encourage oral fluids to ensure adequate hydration if ordered  - Assist patient with urinal to avoid incontinent episodes  -Use female urinal for ease of placement  - Administer ordered medications as needed  - Offer frequent toileting  - Follow urinary retention protocol if ordered  Outcome: Progressing

## 2024-11-09 NOTE — PROGRESS NOTES
11/09/24 0130   Pain Assessment   Pain Assessment Tool 0-10   Pain Score No Pain   Restrictions/Precautions   Precautions Bed/chair alarms;Cognitive;Fall Risk;Supervision on toilet/commode   Braces or Orthoses Other (Comment)  (Stephan/abd binder)   Lower Body Dressing   Type of Assistance Needed Physical assistance   Physical Assistance Level 26%-50%   Comment pt incontinent of urine while sleeping prior to OT arrival with ARVIN Wood working to change pt already having brief off. Pt Patria in stance with gait belt to and dependent to don tab brief due to incontinence. Pt then returned to seated in WC and engaged in donning clean, dry pants. Pt able to recall need to thread affected RLE first with inc time and Patria to manage over R foot, pt then able to thread over LLE, pt Patria in stance and able to recall previous instruction to use LUE to reach far around front to pull pants up, inc time warranted though pt able to manage sweatpants up over both hips with OT providing stabilization for balance.   Lower Body Dressing CARE Score 3   Dressing/Undressing Clothing   Findings pt engaged in ther act in stance with Patria/CGA with gait belt for pt to use LUE reaching around to R side front/and back to retrieve 5 clothes pins from bottom of shirt 3x to challenge standing balance and increase LUE reach to R side in order to better assist with CM for toileting/LB dressing. Pt then engaged in task of managing tied TB to simulate pants up/down hips in stance with CGA and inc time though able to manage up over R hip. Pt benefits from VC to keep weight shifted forward to reduce posterior LOB and to engage core to keep standing balance during reaching. Pt with rest breaks throughout ther act.   Lying to Sitting on Side of Bed   Type of Assistance Needed Physical assistance   Physical Assistance Level 26%-50%   Comment OT continue to encourage pt to push through LUE to come to seated position EOB, pt with dyspnea and continues to  require A for R trunk support and RLE mgmt   Lying to Sitting on Side of Bed CARE Score 3   Sit to Stand   Type of Assistance Needed Physical assistance   Physical Assistance Level 25% or less   Comment pt varrying and typcially Patria when first waking up on OT arrival and improves to CGA with repetition by end of session, VC for foot placement and anterior weight shift to stand   Sit to Stand CARE Score 3   Bed-Chair Transfer   Type of Assistance Needed Incidental touching   Physical Assistance Level No physical assistance   Comment CGA with no AD and gait belt applied   Chair/Bed-to-Chair Transfer CARE Score 4   Transfer Bed/Chair/Wheelchair   Positioning Concerns Hemiplegia;Cognition   Limitations Noted In Balance;Coordination;Endurance;UE Strength;LE Strength   Adaptive Equipment None   Cognition   Overall Cognitive Status Impaired   Arousal/Participation Alert;Cooperative   Attention Attends with cues to redirect   Orientation Level Oriented X4   Memory Decreased short term memory   Following Commands Follows one step commands with increased time or repetition   Activity Tolerance   Activity Tolerance Patient tolerated treatment well   Assessment   Treatment Assessment Pt participated in skilled OT Tx with focus on LB dressing, fxnl reach around body, challenging standing balance for CM, and fxnl transfers. See above note for activity detail. Pt upright in tilt WC with leg rests on, alarm intact, and call bell within reach. Pt continues to benefit from skilled OT services with ongoing focus on RUE NMR, standing balance, stand tolerance, fxnl transfers, and hemiplegic dressing techniques.   Prognosis Good   Problem List Decreased strength;Decreased range of motion;Decreased endurance;Impaired balance;Decreased mobility;Decreased coordination;Decreased cognition;Impaired judgement;Obesity;Impaired tone;Impaired sensation   Barriers to Discharge Inaccessible home environment;Decreased caregiver support   Plan    Treatment/Interventions ADL retraining;Functional transfer training;Endurance training;Patient/family training   Progress Progressing toward goals   Discharge Recommendation   Rehab Resource Intensity Level, OT   (pending pt progress)   OT Therapy Minutes   OT Time In 1330   OT Time Out 1415   OT Total Time (minutes) 45   OT Mode of treatment - Individual (minutes) 45   OT Mode of treatment - Concurrent (minutes) 0   OT Mode of treatment - Group (minutes) 0   OT Mode of treatment - Co-treat (minutes) 0   OT Mode of Treatment - Total time(minutes) 45 minutes   OT Cumulative Minutes 895   Therapy Time missed   Time missed? No

## 2024-11-09 NOTE — PLAN OF CARE
Problem: SAFETY ADULT  Goal: Patient will remain free of falls  Description: INTERVENTIONS:  - Educate patient/family on patient safety including physical limitations  - Instruct patient to call for assistance with activity   - Consult OT/PT to assist with strengthening/mobility   - Keep Call bell within reach  - Keep bed low and locked with side rails adjusted as appropriate  - Keep care items and personal belongings within reach  - Initiate and maintain comfort rounds  - Make Fall Risk Sign visible to staff  - Offer Toileting every 4 Hours, in advance of need  - Initiate/Maintain  alarm  - Obtain necessary fall risk management equipment: w/c  - Apply yellow socks and bracelet for high fall risk patients  - Consider moving patient to room near nurses station  Outcome: Progressing

## 2024-11-09 NOTE — PROGRESS NOTES
"NEPHROLOGY PROGRESS NOTE    Tommie Taylor 59 y.o. male MRN: [id number]  Unit/Bed#: [facility] 261-01 Encounter: [id number]  Reason for Consult: Chronic kidney disease    The patient was resting when I went into the room I awakened him he was laying flat said he felt well.  He told me he is wearing his compression stockings and his legs feel like they are at their normal level of swelling.    ASSESSMENT/PLAN:    1.  Renal    The patient has baseline creatinine of 3.5-3.7.  He had received a contrast study and creatinine increased since then.  Baseline prior to this admission was around 3 so it potentially could drift back down in time.  Creatinine yesterday was 3.6 with normal electrolytes and has remained stable.  He follows with a primary nephrologist.    At this point he stable clinically laying flat will continue current medications and monitor clinically.    Once he is discharged he will resume follow-up with his primary nephrologist.    2.  Status post CVA.  PT/OT.        SUBJECTIVE:  Review of Systems   Constitutional: Negative for chills and fever.   HENT: Negative.     Eyes: Negative.    Cardiovascular:  Negative for chest pain, dyspnea on exertion and orthopnea.   Respiratory: Negative.  Negative for cough, shortness of breath and sputum production.    Gastrointestinal:  Negative for abdominal pain, diarrhea, nausea and vomiting.   Genitourinary: Negative.    Neurological:  Negative for dizziness and headaches.   Psychiatric/Behavioral:  Negative for altered mental status.        OBJECTIVE:  Current Weight: Weight - Scale: 120 kg (264 lb 12.8 oz)  Vitals:Temp (24hrs), Av.3 °F (36.8 °C), Min:97.9 °F (36.6 °C), Max:99.1 °F (37.3 °C)  Current: Temperature: 97.9 °F (36.6 °C)   Blood pressure 147/81, pulse 73, temperature 97.9 °F (36.6 °C), temperature source Tympanic, resp. rate 18, height 5' 6\" (1.676 m), weight 120 kg (264 lb 12.8 oz), SpO2 96%. , Body mass index is 42.74 kg/m².      Intake/Output Summary " "(Last 24 hours) at 11/9/2024 1207  Last data filed at 11/8/2024 2001  Gross per 24 hour   Intake 1120 ml   Output 400 ml   Net 720 ml       Physical Exam: /81 (BP Location: Left arm)   Pulse 73   Temp 97.9 °F (36.6 °C) (Tympanic)   Resp 18   Ht 5' 6\" (1.676 m)   Wt 120 kg (264 lb 12.8 oz)   SpO2 96%   BMI 42.74 kg/m²   Physical Exam  Constitutional:       General: He is not in acute distress.     Appearance: He is not toxic-appearing.   HENT:      Head: Normocephalic and atraumatic.      Nose: Nose normal.      Mouth/Throat:      Mouth: Mucous membranes are moist.   Eyes:      General: No scleral icterus.     Extraocular Movements: Extraocular movements intact.   Cardiovascular:      Rate and Rhythm: Normal rate and regular rhythm.      Heart sounds:      No gallop.   Pulmonary:      Effort: Pulmonary effort is normal. No respiratory distress.      Breath sounds: No wheezing or rales.   Abdominal:      General: Bowel sounds are normal. There is no distension.      Palpations: Abdomen is soft.      Tenderness: There is no abdominal tenderness.   Neurological:      Mental Status: He is alert and oriented to person, place, and time.   Psychiatric:         Mood and Affect: Mood normal.         Behavior: Behavior normal.         Medications:    Current Facility-Administered Medications:     acetaminophen (TYLENOL) tablet 650 mg, 650 mg, Oral, Q6H PRN, Alex George DO, 650 mg at 11/07/24 0737    aspirin (ECOTRIN LOW STRENGTH) EC tablet 81 mg, 81 mg, Oral, Daily, Alex George DO, 81 mg at 11/09/24 0900    atorvastatin (LIPITOR) tablet 40 mg, 40 mg, Oral, QPM, Alex George DO, 40 mg at 11/08/24 1737    buPROPion (WELLBUTRIN) tablet 100 mg, 100 mg, Oral, BID, Alex George DO, 100 mg at 11/09/24 0901    calcium carbonate (TUMS) chewable tablet 1,000 mg, 1,000 mg, Oral, BID PRN, Alex George DO    docusate sodium (COLACE) capsule 100 mg, 100 mg, Oral, BID, Cathy Miranda MD, 100 mg at 11/09/24 0901    " fluticasone (FLONASE) 50 mcg/act nasal spray 1 spray, 1 spray, Each Nare, Daily, SAMANTHA Kern, 1 spray at 11/09/24 0901    heparin (porcine) subcutaneous injection 7,500 Units, 7,500 Units, Subcutaneous, Q8H MIGEL, Alex George DO, 7,500 Units at 11/09/24 0553    hydrALAZINE (APRESOLINE) tablet 10 mg, 10 mg, Oral, Q8H MIGEL, SAMANTHA Kern, 10 mg at 11/09/24 0553    labetalol (NORMODYNE) tablet 300 mg, 300 mg, Oral, BID, Alex George DO, 300 mg at 11/09/24 0901    lactulose (CHRONULAC) oral solution 20 g, 20 g, Oral, TID PRN, Cathy Miranda MD, 20 g at 11/04/24 1146    loratadine (CLARITIN) tablet 5 mg, 5 mg, Oral, Daily, SAMANTHA Kern, 5 mg at 11/09/24 0900    melatonin tablet 3 mg, 3 mg, Oral, HS, SAMANTHA Kern, 3 mg at 11/08/24 2106    NIFEdipine (PROCARDIA XL) 24 hr tablet 90 mg, 90 mg, Oral, Daily, Alex George DO, 90 mg at 11/09/24 0901    OLANZapine (ZyPREXA) tablet 10 mg, 10 mg, Oral, HS, Alex George DO, 10 mg at 11/08/24 2106    ondansetron (ZOFRAN-ODT) dispersible tablet 4 mg, 4 mg, Oral, Q6H PRN, SAMANTHA Kern    pantoprazole (PROTONIX) EC tablet 40 mg, 40 mg, Oral, Early Morning, Alex George DO, 40 mg at 11/09/24 0553    polyethylene glycol (MIRALAX) packet 17 g, 17 g, Oral, Daily, Cathy Miranda MD, 17 g at 11/09/24 0901    senna (SENOKOT) tablet 17.2 mg, 2 tablet, Oral, HS, Cathy Miranda MD, 17.2 mg at 11/08/24 2106    traZODone (DESYREL) tablet 50 mg, 50 mg, Oral, HS PRN, Alex S Ariel, DO, 50 mg at 11/08/24 2106    Valbenazine Tosylate CAPS 40 mg, 40 mg, Oral, HS, Alex S George, DO, 40 mg at 11/08/24 2108    Laboratory Results:  Lab Results   Component Value Date    WBC 5.16 11/06/2024    HGB 9.3 (L) 11/06/2024    HCT 27.1 (L) 11/06/2024    MCV 79 (L) 11/06/2024     11/06/2024     Lab Results   Component Value Date    SODIUM 138 11/08/2024    K 4.7 11/08/2024     11/08/2024    CO2 23 11/08/2024    BUN 36 (H)  "11/08/2024    CREATININE 3.68 (H) 11/08/2024    GLUC 114 11/08/2024    CALCIUM 8.8 11/08/2024     Lab Results   Component Value Date    CALCIUM 8.8 11/08/2024    PHOS 4.5 11/08/2024     No results found for: \"LABPROT\"    "

## 2024-11-09 NOTE — PROGRESS NOTES
11/09/24 0900   Pain Assessment   Pain Assessment Tool 0-10   Pain Score No Pain   Restrictions/Precautions   Precautions Bed/chair alarms;Fall Risk;Cognitive   Weight Bearing Restrictions No   ROM Restrictions No   Braces or Orthoses Other (Comment)  (TEDS and abdominal binder)   Cognition   Overall Cognitive Status Impaired   Arousal/Participation Alert;Cooperative   Attention Attends with cues to redirect   Orientation Level Oriented X4   Memory Decreased short term memory   Following Commands Unable to follow one step commands   Subjective   Subjective Patient ready for PT today.   Sit to Stand   Type of Assistance Needed Incidental touching   Physical Assistance Level No physical assistance   Comment CGA x1   Sit to Stand CARE Score 4   Bed-Chair Transfer   Type of Assistance Needed Incidental touching   Physical Assistance Level No physical assistance   Comment CGA x1   Chair/Bed-to-Chair Transfer CARE Score 4   Walk 10 Feet   Type of Assistance Needed Incidental touching   Physical Assistance Level No physical assistance   Comment CGA x1   Walk 10 Feet CARE Score 4   Walk 50 Feet with Two Turns   Type of Assistance Needed Incidental touching   Physical Assistance Level No physical assistance   Comment CGA x1   Walk 50 Feet with Two Turns CARE Score 4   Walk 150 Feet   Type of Assistance Needed Incidental touching   Physical Assistance Level No physical assistance   Comment CGA x1   Walk 150 Feet CARE Score 4   Walking 10 Feet on Uneven Surfaces   Type of Assistance Needed Incidental touching   Physical Assistance Level No physical assistance   Comment CGA x1   Walking 10 Feet on Uneven Surfaces CARE Score 4   Ambulation   Primary Mode of Locomotion Prior to Admission Walk   Assist Device   (gait belt contact guard)   Does the patient walk? 2. Yes, 200 feet in total in session   Wheel 50 Feet with Two Turns   Reason if not Attempted Activity not applicable   Wheel 50 Feet with Two Turns CARE Score 9   Wheel  150 Feet   Reason if not Attempted Activity not applicable   Wheel 150 Feet CARE Score 9   Wheelchair mobility   Does the patient use a wheelchair? 0. No   Therapeutic Interventions   Balance 10 cycles of 10 feet each: ambulation weaving through cones, ambulation over canes for step height; side stepping   Neuromuscular Re-Education Standing step taps- forward and laterally- 10 reps each LE, L UE for balance with contact guard on belt   Other Ambulation, transfers, functional activities as noted in objective;   Other Comments   Comments 129/72 mmHg seated mid session taken via dynamap   Assessment   Treatment Assessment Patient tolerated session well today, session focused on ambulation and balance activities today. Patient fatigued during session, patient challenged with R foot clearance. Patient became lightheaded during the middle of the session, patient BP as noted above, nursing made aware. Patient fatigued post session. Patient educated about eating and appropriate hydration per physician recommendations. Patient verbalized understanding. Patient requires skilled PT in order to maximize function.   Problem List Decreased strength;Decreased range of motion;Decreased endurance;Impaired balance;Decreased mobility;Decreased coordination;Decreased cognition;Impaired judgement;Decreased safety awareness;Impaired tone;Obesity;Pain   Barriers to Discharge Inaccessible home environment;Decreased caregiver support   Plan   Treatment/Interventions ADL retraining;Functional transfer training;Therapeutic exercise;Endurance training;Patient/family training;Compensatory technique education   Progress Progressing toward goals   PT Therapy Minutes   PT Time In 0900   PT Time Out 1000   PT Total Time (minutes) 60   PT Mode of treatment - Individual (minutes) 60   PT Mode of treatment - Concurrent (minutes) 0   PT Mode of treatment - Group (minutes) 0   PT Mode of treatment - Co-treat (minutes) 0   PT Mode of Treatment - Total  time(minutes) 60 minutes   PT Cumulative Minutes 1165   Therapy Time missed   Time missed? No

## 2024-11-09 NOTE — PROGRESS NOTES
"   11/09/24 0715   Pain Assessment   Pain Assessment Tool 0-10   Pain Score No Pain   Restrictions/Precautions   Precautions Bed/chair alarms;Fall Risk;Cognitive   Braces or Orthoses Other (Comment)  (TEDs/binder)   Lifestyle   Autonomy \"I can't do it\".   Eating   Type of Assistance Needed Set-up / clean-up   Physical Assistance Level No physical assistance   Comment breakfast   Eating CARE Score 5   Eating Assessment   QI: Swallowing/Nutritional Status None of the above   Oral Hygiene   Type of Assistance Needed Physical assistance   Physical Assistance Level 25% or less   Comment seated due to fatigue after shower, pt attempting to apply toothpaste 2 different times and overshooting 2x with toothbrush resting on sink counter, pt requires assist to apply toothpaste   Oral Hygiene CARE Score 3   Grooming   Able To Initiate Tasks;Wash/Dry Face;Brush/Clean Teeth;Wash/Dry Hands   Limitation Noted In Strength;Safety   Shower/Bathe Self   Type of Assistance Needed Physical assistance   Physical Assistance Level 26%-50%   Comment Assist for R buttock and LUE, pt encouraged to complete reach around in stance to bathe L buttock and able to with CGA for steading from OT, pt otherwise seated on tub bench for bathing UB and able to complete dynamic reach to bathe BL LE. A required for drying body fully.   Shower/Bathe Self CARE Score 3   Bathing   Assessed Bath Style Shower   Anticipated D/C Bath Style Shower   Able to Gather/Transport No   Able to Adjust Water Temperature Yes   Able to Wash/Rinse/Dry (body part) Right Arm;L Upper Leg;R Upper Leg;L Lower Leg/Foot;R Lower Leg/Foot;Chest;Abdomen;Perineal Area   Limitations Noted in Balance;Endurance;Coordination;Strength;Timeliness   Positioning Seated;Standing   Adaptive Equipment Shower Bars;Tub Bench;Hand Held Shower   Tub/Shower Transfer   Limitations Noted In Balance;UE Strength;LE Strength   Adaptive Equipment Grab Bars;Transfer Bench   Assessed Shower   Findings CGA/Patria " "with grab bar for stand pivot and side stepping to tub bench   Upper Body Dressing   Type of Assistance Needed Physical assistance   Physical Assistance Level 26%-50%   Comment pt attempting hemiplegia dressing technique for inc time with max difficulty and required visual demo to improve recall/carryover, pt then able to thread RUE with exaggerated trunk flexion and intermittent A from OT, pt requires some A to manage down in back though able to manage OH and place LUE through. Pt frequently stating \"I can't do it\".   Upper Body Dressing CARE Score 3   Lower Body Dressing   Type of Assistance Needed Physical assistance   Physical Assistance Level 26%-50%   Comment Assist required for doffing/donning tab brief due to ongoing incontinence, pt encouraged to thread pants over affected RLE first and able to with inc time and effort, pt then able to thread LLE and Patria in stance for CM up over hips though pt able to assist with LUE   Lower Body Dressing CARE Score 3   Putting On/Taking Off Footwear   Type of Assistance Needed Physical assistance   Physical Assistance Level Total assistance   Comment pt dependent to don TEDs and socks/shoes today due to fatigue after estefania dressing techniques and bathing   Putting On/Taking Off Footwear CARE Score 1   Dressing/Undressing Clothing   Remove UB Clothes Other  (tied hospital gown)   Don UB Clothes Other  (pullover sweatshirt)   Remove LB Clothes Pants;Socks;Other  (tab brief)   Don LB Clothes Pants;Socks;TEDs;Shoes;Other  (tab brief)   Limitations Noted In Balance;Coordination;Endurance;Problem Solving;Strength;ROM;Timeliness   Positioning Supported Sit;Standing   Lying to Sitting on Side of Bed   Type of Assistance Needed Physical assistance   Physical Assistance Level 26%-50%   Comment pt required A for R side of trunk and encouraged to advance RLE out of bed   Lying to Sitting on Side of Bed CARE Score 3   Sit to Stand   Type of Assistance Needed Physical assistance " "  Physical Assistance Level 25% or less   Comment pt initially Patria and progressing to CGA with gait belt, no AD   Sit to Stand CARE Score 3   Bed-Chair Transfer   Type of Assistance Needed Physical assistance   Physical Assistance Level 25% or less   Comment Patria with no AD and gait belt for SPT initially first thing in AM with pt having difficulty with movements when first waking up, pt progressing to CGA and short ambulation with no AD by end of session   Chair/Bed-to-Chair Transfer CARE Score 3   Transfer Bed/Chair/Wheelchair   Positioning Concerns Hemiplegia;Cognition   Limitations Noted In Balance;Coordination;Endurance;Problem Solving;UE Strength;LE Strength   Adaptive Equipment None   Toileting Hygiene   Type of Assistance Needed Physical assistance   Physical Assistance Level 26%-50%   Comment pt stating \"I need to go to the bathroom... it's already starting\" and incontient of some urine in tab brief. pt requires Patria to doff soiled tab brief and Patria for set up and assist to manage back up over hips with inc time, pt encouraged to and able to help on L side, pt urinating seated   Toileting Hygiene CARE Score 3   Toileting   Able to Pull Clothing down no, up yes.   Manage Hygiene Bladder   Limitations Noted In Balance;Safety;LE Strength;UE Strength;ROM   Adaptive Equipment Grab Bar   Toilet Transfer   Type of Assistance Needed Incidental touching   Physical Assistance Level No physical assistance   Comment CGA with gait belt and grab bar   Toilet Transfer CARE Score 4   Toilet Transfer   Surface Assessed Standard Toilet   Transfer Technique Stand Pivot   Limitations Noted In Balance;Endurance;Safety;UE Strength;LE Strength   Cognition   Overall Cognitive Status Impaired   Arousal/Participation Alert;Cooperative   Attention Attends with cues to redirect   Orientation Level Oriented X4   Memory Decreased short term memory   Following Commands Unable to follow one step commands   Activity Tolerance   Activity " Tolerance Patient tolerated treatment well   Other Comments   Assessment BPs supine /70, seated 137/73, no c.o. dizziness/lightheadedness, TEDs still applied at end of Tx session   Assessment   Treatment Assessment Pt participated in skilled OT tx with focus on completion of ADL routine for shower completion and improved overall ability to assist with bathing/dressing and toileting with encouragement from OT and instruction for hemiplegia dressing techniques. Pt with impaired problem solving and requires step by step assist and inc time to problem solve UB/LB dressing tasks and oral hygiene set up. Pt continues to benefit from skilled OT services to maximize rehab potential.   Prognosis Good   Problem List Decreased strength;Decreased range of motion;Decreased endurance;Impaired balance;Decreased mobility;Decreased coordination;Decreased cognition;Decreased safety awareness;Obesity;Impaired tone   Barriers to Discharge Inaccessible home environment;Decreased caregiver support   Plan   Treatment/Interventions ADL retraining;Functional transfer training;Cognitive reorientation;Patient/family training;Bed mobility;Compensatory technique education   Progress Progressing toward goals   Discharge Recommendation   Rehab Resource Intensity Level, OT   (pending pt progress)   OT Therapy Minutes   OT Time In 0715   OT Time Out 0830   OT Total Time (minutes) 75   OT Mode of treatment - Individual (minutes) 75   OT Mode of treatment - Concurrent (minutes) 0   OT Mode of treatment - Group (minutes) 0   OT Mode of treatment - Co-treat (minutes) 0   OT Mode of Treatment - Total time(minutes) 75 minutes   OT Cumulative Minutes 925   Therapy Time missed   Time missed? No

## 2024-11-10 LAB
GLUCOSE SERPL-MCNC: 105 MG/DL (ref 65–140)
GLUCOSE SERPL-MCNC: 106 MG/DL (ref 65–140)
GLUCOSE SERPL-MCNC: 111 MG/DL (ref 65–140)

## 2024-11-10 PROCEDURE — 94660 CPAP INITIATION&MGMT: CPT

## 2024-11-10 PROCEDURE — 97112 NEUROMUSCULAR REEDUCATION: CPT

## 2024-11-10 PROCEDURE — 97530 THERAPEUTIC ACTIVITIES: CPT

## 2024-11-10 PROCEDURE — 97110 THERAPEUTIC EXERCISES: CPT

## 2024-11-10 PROCEDURE — 97129 THER IVNTJ 1ST 15 MIN: CPT

## 2024-11-10 PROCEDURE — 97130 THER IVNTJ EA ADDL 15 MIN: CPT

## 2024-11-10 PROCEDURE — 99232 SBSQ HOSP IP/OBS MODERATE 35: CPT | Performed by: INTERNAL MEDICINE

## 2024-11-10 PROCEDURE — 82948 REAGENT STRIP/BLOOD GLUCOSE: CPT

## 2024-11-10 PROCEDURE — 97535 SELF CARE MNGMENT TRAINING: CPT

## 2024-11-10 RX ADMIN — POLYETHYLENE GLYCOL 3350 17 G: 17 POWDER, FOR SOLUTION ORAL at 08:58

## 2024-11-10 RX ADMIN — LABETALOL HYDROCHLORIDE 300 MG: 100 TABLET, FILM COATED ORAL at 21:07

## 2024-11-10 RX ADMIN — HYDRALAZINE HYDROCHLORIDE 10 MG: 10 TABLET ORAL at 14:45

## 2024-11-10 RX ADMIN — HYDRALAZINE HYDROCHLORIDE 10 MG: 10 TABLET ORAL at 21:08

## 2024-11-10 RX ADMIN — VALBENAZINE 40 MG: 40 CAPSULE ORAL at 22:00

## 2024-11-10 RX ADMIN — BUPROPION HYDROCHLORIDE 100 MG: 100 TABLET, FILM COATED ORAL at 08:58

## 2024-11-10 RX ADMIN — SENNOSIDES 17.2 MG: 8.6 TABLET, FILM COATED ORAL at 21:09

## 2024-11-10 RX ADMIN — LABETALOL HYDROCHLORIDE 300 MG: 100 TABLET, FILM COATED ORAL at 08:58

## 2024-11-10 RX ADMIN — PANTOPRAZOLE SODIUM 40 MG: 40 TABLET, DELAYED RELEASE ORAL at 05:43

## 2024-11-10 RX ADMIN — FLUTICASONE PROPIONATE 1 SPRAY: 50 SPRAY, METERED NASAL at 08:58

## 2024-11-10 RX ADMIN — ATORVASTATIN CALCIUM 40 MG: 40 TABLET, FILM COATED ORAL at 17:12

## 2024-11-10 RX ADMIN — DOCUSATE SODIUM 100 MG: 100 CAPSULE, LIQUID FILLED ORAL at 17:12

## 2024-11-10 RX ADMIN — DOCUSATE SODIUM 100 MG: 100 CAPSULE, LIQUID FILLED ORAL at 08:58

## 2024-11-10 RX ADMIN — NIFEDIPINE 90 MG: 30 TABLET, FILM COATED, EXTENDED RELEASE ORAL at 08:58

## 2024-11-10 RX ADMIN — HEPARIN SODIUM 7500 UNITS: 5000 INJECTION INTRAVENOUS; SUBCUTANEOUS at 06:52

## 2024-11-10 RX ADMIN — MELATONIN TAB 3 MG 3 MG: 3 TAB at 21:08

## 2024-11-10 RX ADMIN — BUPROPION HYDROCHLORIDE 100 MG: 100 TABLET, FILM COATED ORAL at 17:12

## 2024-11-10 RX ADMIN — OLANZAPINE 10 MG: 2.5 TABLET, FILM COATED ORAL at 21:06

## 2024-11-10 RX ADMIN — HYDRALAZINE HYDROCHLORIDE 10 MG: 10 TABLET ORAL at 05:43

## 2024-11-10 RX ADMIN — HEPARIN SODIUM 7500 UNITS: 5000 INJECTION INTRAVENOUS; SUBCUTANEOUS at 21:10

## 2024-11-10 RX ADMIN — ASPIRIN 81 MG: 81 TABLET, COATED ORAL at 08:58

## 2024-11-10 RX ADMIN — LORATADINE 5 MG: 10 TABLET ORAL at 08:58

## 2024-11-10 RX ADMIN — HEPARIN SODIUM 7500 UNITS: 5000 INJECTION INTRAVENOUS; SUBCUTANEOUS at 14:45

## 2024-11-10 RX ADMIN — LACTULOSE 20 G: 20 SOLUTION ORAL at 17:12

## 2024-11-10 NOTE — PROGRESS NOTES
11/10/24 0900   Pain Assessment   Pain Assessment Tool 0-10   Pain Score No Pain   Restrictions/Precautions   Precautions Bed/chair alarms;Cognitive;Fall Risk;Supervision on toilet/commode   Comprehension   Comprehension (FIM) 4 - Understands basic info/conversation 75-90% of time   Expression   Expression (FIM) 4 - Expresses basic info/needs 75-90% of time   Social Interaction   Social Interaction (FIM) 5 - Interacts appropriately with others 90% of time   Problem Solving   Problem solving (FIM) 3 - Solves basic problmes 50-74% of time   Memory   Memory (FIM) 3 - Recognizes, recalls/performs 50-74%   Speech/Language/Cognition Assessment   Treatment Assessment Pt participated in skilled SLP session focusing on cognitive linguistic skills. At beginning of session, given min verbal cues, pt recalled having family training completed and recalled which members of family were present. He recalled current discharge date for 11/18 home with family support/assistance. In discussion of previous session, pt initially did not recall focusing on review of meds previously, however, after follow up, pt did recall having a reference sheet made for him and located it on his tray table. Once SLP then presented pt with a mock prescription medication label, pt then also recalled previously completing an activity similar to this. When label was enlarged, pt answered comprehension questions with 3/7 accuracy. Throughout task, pt continued to report difficulty with vision, which he reports some difficulty at baseline but that it has worsened since his stroke. SLP also observed some R sided inattention which was noted last session as well. Overall, moderate to max verbal and visual cues were needed to locate information.     Engaged in a simple problem solving task, pt was verbally presented with Fo4 words and was requested to identify which word did not belong with the others based on category exclusion. As pt continues with  "difficulty in vision, words were not presented visually but were auditorily, noting the appropriate need for additional repetition of stimuli at times. Pt was 17/20 accurate with task, improving to 20/20 when given moderate semantic cuing but did require total assist for one trial.     Pt continued with improvement in alertness and overall speech intelligibility this session. When reviewing pt's perception of his own speech clarity, pt reports noticing an overall improvement in speech skills as well but reported that he does feel \"more slurred\" when he \"gets excited,\" and when fatigued. At this time, pt is recommended for and will continue to benefit form further skilled SLP services focusing on overall cognitive linguistic skills and speech intelligibility skills in order to maximize independence on discharge.    SLP Therapy Minutes   SLP Time In 0900   SLP Time Out 0930   SLP Total Time (minutes) 30   SLP Mode of treatment - Individual (minutes) 30   SLP Mode of treatment - Concurrent (minutes) 0   SLP Mode of treatment - Group (minutes) 0   SLP Mode of treatment - Co-treat (minutes) 0   SLP Mode of Treatment - Total time(minutes) 30 minutes   SLP Cumulative Minutes 335   Therapy Time missed   Time missed? No       "

## 2024-11-10 NOTE — PROGRESS NOTES
11/10/24 0710   Pain Assessment   Pain Assessment Tool 0-10   Pain Score No Pain   Restrictions/Precautions   Precautions Bed/chair alarms;Fall Risk;Supervision on toilet/commode;Cognitive   Braces or Orthoses Other (Comment)  (TEDs/abdominal binder)   Eating   Type of Assistance Needed Set-up / clean-up   Physical Assistance Level No physical assistance   Comment breakfast   Eating CARE Score 5   Eating Assessment   Meal Assessed Breakfast   QI: Swallowing/Nutritional Status None of the above   Oral Hygiene   Type of Assistance Needed Supervision;Set-up / clean-up   Physical Assistance Level No physical assistance   Comment less assist required today with set up only, pt seated in WC at sink to complete   Oral Hygiene CARE Score 4   Grooming   Able To Initiate Tasks;Wash/Dry Hands;Brush/Clean Teeth;Wash/Dry Face   Limitation Noted In Coordination;Problem Solving;Safety;Strength;Timeliness   Shower/Bathe Self   Type of Assistance Needed Physical assistance   Physical Assistance Level 26%-50%   Comment A for R buttock in stance and A for LUE due to R hemiplegia, pt otherwise able to complete UB bathing seated on tub bench and able to complete dynamic reach to bathe BL LE, pt Patria in stance for steadying balance to reach LUE around to bathe L buttock and groin area.   Shower/Bathe Self CARE Score 3   Bathing   Assessed Bath Style Shower   Anticipated D/C Bath Style Shower   Able to Gather/Transport No   Able to Adjust Water Temperature Yes   Able to Wash/Rinse/Dry (body part) Right Arm;L Upper Leg;R Upper Leg;L Lower Leg/Foot;R Lower Leg/Foot;Chest;Abdomen;Perineal Area   Limitations Noted in Balance;Coordination;Endurance;Problem Solving;ROM;Safety;Strength   Positioning Seated;Standing   Adaptive Equipment Tub Bench;Shower Bars;Hand Held Shower   Findings  pt initially declined shower with plan to just do brief wash up and get dressed due to showering yesterday however pt incontinent of urine all over himself and  "floor stating \"I might as well shower now that I peed all over myself\".   Tub/Shower Transfer   Limitations Noted In Balance;Endurance;Problem Solving;Safety;LE Strength;UE Strength   Adaptive Equipment Grab Bars;Transfer Bench   Assessed Shower   Findings CGA/Patria with use of grab bar   Upper Body Dressing   Type of Assistance Needed Physical assistance   Physical Assistance Level 26%-50%   Comment pt with no clean clothes left and donning tied hospital gown for now to simulate UB dressing to improve carryover of hemiplegia technique, pt continues to require Patria to thread over RUE and VC for fwd trunk lean to facilitate.   Upper Body Dressing CARE Score 3   Lower Body Dressing   Type of Assistance Needed Physical assistance   Physical Assistance Level 26%-50%   Comment pt continues to be dependent for tab brief due to incontinence, pt is able to thread affected R pantleg with inc time and Patria from OT to reposition due to pants getting stuck on foot, attempting edu for use of LHR however pt with max difficulty problem solving. Pt able to thread LLE with inc time, pt Patria in stance for A with pants up over R buttock though pt continues to make more efforts to manage pants up over hips himself.   Lower Body Dressing CARE Score 3   Putting On/Taking Off Footwear   Type of Assistance Needed Physical assistance;Adaptive equipment   Physical Assistance Level 76% or more   Comment pt remains dependent for TEDs. OT provided initial edu for use of dressing stick to doff socks with pt demo fair carryover though states \"I don't like that thing because I can't see what I'm doing\". OT provided initial edu for one handed sock technique with pt attempting 2x before giving up and stating \"my belly is in the way\" and dependent to don socks and sneakers. Edu to invest in either slip on sneakers, elastic laces, or velcro shoes with pt receptive stating \"I'm getting new shoes when I leave here\".   Putting On/Taking Off Footwear CARE " Score 2   Dressing/Undressing Clothing   Limitations Noted In Balance;Endurance;Problem Solving;Safety;Strength;ROM;Other  (body habitus)   Positioning Supported Sit;Standing   Lying to Sitting on Side of Bed   Type of Assistance Needed Physical assistance   Physical Assistance Level 26%-50%   Comment OT attempted to teach log rolling technique to improve bed mobility and edu to push through LUE to move to sitting however pt continues to have max difficulty with HOB flat and requires A for trunk to come to sit EOB   Lying to Sitting on Side of Bed CARE Score 3   Sit to Stand   Type of Assistance Needed Physical assistance   Physical Assistance Level 25% or less   Comment pt remains CGA/Patria and fluctuates with fatigue, frequent VC for foot placement and anterior weight shift   Sit to Stand CARE Score 3   Bed-Chair Transfer   Type of Assistance Needed Physical assistance   Physical Assistance Level 25% or less   Comment Patria with no AD and use of gait belt for short distance ambulation and stand pivot transfers both L/R   Chair/Bed-to-Chair Transfer CARE Score 3   Transfer Bed/Chair/Wheelchair   Positioning Concerns Hemiplegia;Cognition   Limitations Noted In Balance;Coordination;Endurance;Problem Solving;UE Strength;LE Strength   Adaptive Equipment None   Toileting Hygiene   Type of Assistance Needed Physical assistance   Physical Assistance Level 26%-50%   Comment pt needing to go to the bathroom at start of session however once in WC pt unable to hold it any longer and peeing all over the floor throughout the room due to brief not on correctly after twisting and turning all night and limited fit of brief. Towel used to clean up room and environmental services contacted and came in to mop floor. Pt requires ongoing A to manage clothing up/down   Toileting Hygiene CARE Score 3   Toileting   Able to Pull Clothing down no, up yes.   Manage Hygiene Bladder   Limitations Noted In Balance;Problem Solving;Safety;UE  Strength;LE Strength   Adaptive Equipment Grab Bar   Toilet Transfer   Type of Assistance Needed Incidental touching   Physical Assistance Level No physical assistance   Comment CGA with use of grab bar for stand pivot to standard toilet   Toilet Transfer CARE Score 4   Toilet Transfer   Surface Assessed Standard Toilet   Transfer Technique Stand Pivot   Limitations Noted In Balance;Endurance;Safety;UE Strength;LE Strength   Adaptive Equipment Grab Bar   Cognition   Overall Cognitive Status Impaired   Arousal/Participation Alert;Cooperative   Attention Attends with cues to redirect   Orientation Level Oriented X4   Memory Decreased short term memory   Following Commands Follows one step commands inconsistently   Comments pt with limited recall and carryover for safe set up of transfers and for hemiplegic dressing strategies, pt inconsistent for recall and following commands and requires repetition of instruction   Activity Tolerance   Activity Tolerance Patient tolerated treatment well   Assessment   Treatment Assessment Pt participated in skilled OT Tx with focus on ADL routine and emphasis on hemiplegic dressing strategies and use of dressing stick. Pt continues to be incontinent of urine and would benefit from establishing toileting schedule to reduce episodes of incontinence. Pt continues to benefit from skilled OT services to maximize rehab potential with ongoing focus on RUE NMR, stand christopher/balance, toileting, and hemiplegic dressing education.   Prognosis Good   Problem List Decreased strength;Decreased range of motion;Decreased endurance;Impaired balance;Decreased mobility;Decreased coordination;Decreased cognition;Decreased safety awareness;Impaired judgement;Impaired sensation;Impaired tone;Obesity   Barriers to Discharge Inaccessible home environment;Decreased caregiver support   Plan   Treatment/Interventions ADL retraining;Functional transfer training;Cognitive reorientation;Endurance  training;Patient/family training;Equipment eval/education;Compensatory technique education;Bed mobility   Progress Progressing toward goals   Discharge Recommendation   Rehab Resource Intensity Level, OT   (pending pt progress)   OT Therapy Minutes   OT Time In 0710   OT Time Out 0840   OT Total Time (minutes) 90   OT Mode of treatment - Individual (minutes) 90   OT Mode of treatment - Concurrent (minutes) 0   OT Mode of treatment - Group (minutes) 0   OT Mode of treatment - Co-treat (minutes) 0   OT Mode of Treatment - Total time(minutes) 90 minutes   OT Cumulative Minutes 1060   Therapy Time missed   Time missed? No

## 2024-11-10 NOTE — PLAN OF CARE
Problem: SAFETY ADULT  Goal: Patient will remain free of falls  Description: INTERVENTIONS:  - Educate patient/family on patient safety including physical limitations  - Instruct patient to call for assistance with activity   - Consult OT/PT to assist with strengthening/mobility   - Keep Call bell within reach  - Keep bed low and locked with side rails adjusted as appropriate  - Keep care items and personal belongings within reach  - Initiate and maintain comfort rounds  - Make Fall Risk Sign visible to staff  - Offer Toileting every 2-4 Hours, in advance of need  - Initiate/Maintain bed alarm  - Obtain necessary fall risk management equipment: matts  - Apply yellow socks and bracelet for high fall risk patients  - Consider moving patient to room near nurses station  Outcome: Progressing      No

## 2024-11-10 NOTE — PROGRESS NOTES
"NEPHROLOGY PROGRESS NOTE    Tommie Taylor 59 y.o. male MRN: [id number]  Unit/Bed#: [facility] 261-01 Encounter: [id number]  Reason for Consult: Chronic kidney disease    Patient is awake alert sitting up in his wheelchair says he is feeling well offers no other complaints.  No shortness of breath no confusion.    ASSESSMENT/PLAN:  1.  Renal    Patient is chronic kidney disease with baseline creatinine 3.5-3.7.  He received CAT scan with contrast and creatinine increased and then it began to improve and was last checked in baseline range.    Continue current medications  BMP a.m.  Follow-up with his primary nephrologist on discharge    2.  Patient is status post CVA.  PT/OT.        SUBJECTIVE:  Review of Systems   Constitutional: Negative for chills, diaphoresis and fever.   HENT: Negative.     Eyes: Negative.    Cardiovascular:  Negative for chest pain, dyspnea on exertion and orthopnea.   Respiratory:  Negative for cough and shortness of breath.    Gastrointestinal:  Negative for abdominal pain, diarrhea, nausea and vomiting.   Genitourinary: Negative.    Neurological:  Negative for dizziness and headaches.   Psychiatric/Behavioral:  Negative for altered mental status.        OBJECTIVE:  Current Weight: Weight - Scale: 120 kg (264 lb 12.8 oz)  Vitals:Temp (24hrs), Av °F (36.1 °C), Min:96.7 °F (35.9 °C), Max:97.1 °F (36.2 °C)  Current: Temperature: (!) 96.7 °F (35.9 °C)   Blood pressure 135/74, pulse 70, temperature (!) 96.7 °F (35.9 °C), temperature source Tympanic, resp. rate 20, height 5' 6\" (1.676 m), weight 120 kg (264 lb 12.8 oz), SpO2 96%. , Body mass index is 42.74 kg/m².      Intake/Output Summary (Last 24 hours) at 11/10/2024 1238  Last data filed at 11/10/2024 0900  Gross per 24 hour   Intake 2240 ml   Output 500 ml   Net 1740 ml       Physical Exam: /74 (BP Location: Left arm)   Pulse 70   Temp (!) 96.7 °F (35.9 °C) (Tympanic)   Resp 20   Ht 5' 6\" (1.676 m)   Wt 120 kg (264 lb 12.8 oz)   SpO2 " 96%   BMI 42.74 kg/m²   Physical Exam  Constitutional:       General: He is not in acute distress.     Appearance: He is not toxic-appearing.   HENT:      Head: Normocephalic and atraumatic.      Nose: Nose normal.      Mouth/Throat:      Mouth: Mucous membranes are dry.   Eyes:      General: No scleral icterus.     Extraocular Movements: Extraocular movements intact.   Cardiovascular:      Rate and Rhythm: Normal rate and regular rhythm.      Heart sounds:      No gallop.   Pulmonary:      Effort: Pulmonary effort is normal. No respiratory distress.      Breath sounds: No wheezing or rales.   Abdominal:      General: Bowel sounds are normal. There is no distension.      Palpations: Abdomen is soft.      Tenderness: There is no abdominal tenderness.   Neurological:      Mental Status: He is alert and oriented to person, place, and time.   Psychiatric:         Mood and Affect: Mood normal.         Behavior: Behavior normal.         Medications:    Current Facility-Administered Medications:     acetaminophen (TYLENOL) tablet 650 mg, 650 mg, Oral, Q6H PRN, Alex George DO, 650 mg at 11/07/24 0737    aspirin (ECOTRIN LOW STRENGTH) EC tablet 81 mg, 81 mg, Oral, Daily, Alex George DO, 81 mg at 11/10/24 0858    atorvastatin (LIPITOR) tablet 40 mg, 40 mg, Oral, QPM, Alex George DO, 40 mg at 11/09/24 1735    buPROPion (WELLBUTRIN) tablet 100 mg, 100 mg, Oral, BID, Alex George DO, 100 mg at 11/10/24 0858    calcium carbonate (TUMS) chewable tablet 1,000 mg, 1,000 mg, Oral, BID PRN, Alex George DO    docusate sodium (COLACE) capsule 100 mg, 100 mg, Oral, BID, Cathy Miranda MD, 100 mg at 11/10/24 0858    fluticasone (FLONASE) 50 mcg/act nasal spray 1 spray, 1 spray, Each Nare, Daily, SAMANTHA Kern, 1 spray at 11/10/24 0858    heparin (porcine) subcutaneous injection 7,500 Units, 7,500 Units, Subcutaneous, Q8H MIGEL, Alex George DO, 7,500 Units at 11/10/24 0652    hydrALAZINE (APRESOLINE) tablet 10  "mg, 10 mg, Oral, Q8H MIGEL, SAMANTHA Kern, 10 mg at 11/10/24 0543    labetalol (NORMODYNE) tablet 300 mg, 300 mg, Oral, BID, Alex George DO, 300 mg at 11/10/24 0858    lactulose (CHRONULAC) oral solution 20 g, 20 g, Oral, TID PRN, Cathy Miranda MD, 20 g at 11/04/24 1146    loratadine (CLARITIN) tablet 5 mg, 5 mg, Oral, Daily, SAMANTHA Kern, 5 mg at 11/10/24 0858    melatonin tablet 3 mg, 3 mg, Oral, HS, SAMANTHA Kern, 3 mg at 11/09/24 2120    NIFEdipine (PROCARDIA XL) 24 hr tablet 90 mg, 90 mg, Oral, Daily, Alex George, DO, 90 mg at 11/10/24 0858    OLANZapine (ZyPREXA) tablet 10 mg, 10 mg, Oral, HS, Alex George, DO, 10 mg at 11/09/24 2119    ondansetron (ZOFRAN-ODT) dispersible tablet 4 mg, 4 mg, Oral, Q6H PRN, SAMANTHA Kern    pantoprazole (PROTONIX) EC tablet 40 mg, 40 mg, Oral, Early Morning, Alex George, DO, 40 mg at 11/10/24 0543    polyethylene glycol (MIRALAX) packet 17 g, 17 g, Oral, Daily, Cathy Miranda MD, 17 g at 11/10/24 0858    senna (SENOKOT) tablet 17.2 mg, 2 tablet, Oral, HS, Cathy Miranda MD, 17.2 mg at 11/08/24 2106    traZODone (DESYREL) tablet 50 mg, 50 mg, Oral, HS PRN, Alex George DO, 50 mg at 11/08/24 2106    Valbenazine Tosylate CAPS 40 mg, 40 mg, Oral, HS, Alexrobert George, DO, 40 mg at 11/09/24 2128    Laboratory Results:  Lab Results   Component Value Date    WBC 5.16 11/06/2024    HGB 9.3 (L) 11/06/2024    HCT 27.1 (L) 11/06/2024    MCV 79 (L) 11/06/2024     11/06/2024     Lab Results   Component Value Date    SODIUM 138 11/08/2024    K 4.7 11/08/2024     11/08/2024    CO2 23 11/08/2024    BUN 36 (H) 11/08/2024    CREATININE 3.68 (H) 11/08/2024    GLUC 114 11/08/2024    CALCIUM 8.8 11/08/2024     Lab Results   Component Value Date    CALCIUM 8.8 11/08/2024    PHOS 4.5 11/08/2024     No results found for: \"LABPROT\"    "

## 2024-11-10 NOTE — PROGRESS NOTES
11/10/24 1000   Pain Assessment   Pain Assessment Tool 0-10   Pain Score No Pain   Restrictions/Precautions   Precautions Bed/chair alarms;Cognitive;Fall Risk;Supervision on toilet/commode   Weight Bearing Restrictions No   ROM Restrictions No   Braces or Orthoses   (TEDs/binder)   Cognition   Overall Cognitive Status Impaired   Arousal/Participation Alert;Cooperative   Attention Attends with cues to redirect   Orientation Level Oriented X4   Memory Decreased short term memory   Following Commands Follows one step commands inconsistently   Sit to Stand   Type of Assistance Needed Physical assistance   Physical Assistance Level 25% or less   Comment Pt initially Patria progressing to CGA by end of session   Sit to Stand CARE Score 3   Bed-Chair Transfer   Type of Assistance Needed Physical assistance   Physical Assistance Level 25% or less   Comment Patria with no AD   Chair/Bed-to-Chair Transfer CARE Score 3   Transfer Bed/Chair/Wheelchair   Limitations Noted In Balance;Coordination   Adaptive Equipment None   Walk 10 Feet   Type of Assistance Needed Physical assistance;Incidental touching   Physical Assistance Level 25% or less   Comment CGA/Patria   Walk 10 Feet CARE Score 3   Walk 50 Feet with Two Turns   Type of Assistance Needed Physical assistance;Incidental touching   Physical Assistance Level 25% or less   Comment CGA/Patria no AD   Walk 50 Feet with Two Turns CARE Score 3   Ambulation   Primary Mode of Locomotion Prior to Admission Walk   Distance Walked (feet) 120 ft  (x1)   Assist Device   (Gait belt)   Gait Pattern Slow Lesley;Decreased foot clearance;R foot drag;R hemiparesis;Decreased R stance   Limitations Noted In Balance;Coordination;Endurance;Sequencing;Safety   Provided Assistance with: Balance;Direction   Does the patient walk? 2. Yes   Toilet Transfer   Type of Assistance Needed Incidental touching   Physical Assistance Level No physical assistance   Comment CGA with gait belt   Toilet Transfer CARE  Score 4   Toilet Transfer   Surface Assessed Standard Toilet   Limitations Noted In Balance   Findings Pt was incontinent of urine in brief during session. Pt completed toilet transfer CGA. Assist needed for donning brief and clothing management.   Therapeutic Interventions   Strengthening Standing TE at // bar: heel raises, hip flexion, and hip abduction 3 x 10 ea   Neuromuscular Re-Education Standing tap ups to 4 in step; fwd/bwd walking; sidestepping; lateral step overs SPC; fwd/bwd stepping over SPC   Other Comments   Comments 130/88 seated at start of session, 110/80 post-ambulation   Assessment   Treatment Assessment Pt participated in skilled PT session with focus on balance and functional mobility. During first ambulation bout, pt noted to become unsteady requiring increased assit. Chair brought to pt and BP checked 110/88. Pt with TEDs and abdominal binder donned t/o session. Patient continues to have decreased R foot clearance. With standing balance activities focus on RLE clearance with pt fatiguing quickly. Paitent would continue to benefit form skilled PT intervnetions to maximize independnece with functional mobility.   Problem List Decreased strength;Decreased endurance;Impaired balance;Decreased coordination;Decreased cognition;Impaired judgement;Decreased safety awareness   Barriers to Discharge Inaccessible home environment;Decreased caregiver support   Plan   Treatment/Interventions Functional transfer training;LE strengthening/ROM;Elevations;Therapeutic exercise;Endurance training;Patient/family training;Gait training   Progress Progressing toward goals   PT Therapy Minutes   PT Time In 1000   PT Time Out 1130   PT Total Time (minutes) 90   PT Mode of treatment - Individual (minutes) 90   PT Mode of treatment - Concurrent (minutes) 0   PT Mode of treatment - Group (minutes) 0   PT Mode of treatment - Co-treat (minutes) 0   PT Mode of Treatment - Total time(minutes) 90 minutes   PT Cumulative  Minutes 1255   Therapy Time missed   Time missed? No

## 2024-11-11 LAB
ANION GAP SERPL CALCULATED.3IONS-SCNC: 7 MMOL/L (ref 4–13)
BASOPHILS # BLD AUTO: 0.04 THOUSANDS/ΜL (ref 0–0.1)
BASOPHILS NFR BLD AUTO: 1 % (ref 0–1)
BUN SERPL-MCNC: 43 MG/DL (ref 5–25)
CALCIUM SERPL-MCNC: 8.6 MG/DL (ref 8.4–10.2)
CHLORIDE SERPL-SCNC: 107 MMOL/L (ref 96–108)
CO2 SERPL-SCNC: 23 MMOL/L (ref 21–32)
CREAT SERPL-MCNC: 3.91 MG/DL (ref 0.6–1.3)
DME PARACHUTE DELIVERY DATE REQUESTED: NORMAL
DME PARACHUTE ITEM DESCRIPTION: NORMAL
DME PARACHUTE ORDER STATUS: NORMAL
DME PARACHUTE SUPPLIER NAME: NORMAL
DME PARACHUTE SUPPLIER PHONE: NORMAL
EOSINOPHIL # BLD AUTO: 0.19 THOUSAND/ΜL (ref 0–0.61)
EOSINOPHIL NFR BLD AUTO: 3 % (ref 0–6)
ERYTHROCYTE [DISTWIDTH] IN BLOOD BY AUTOMATED COUNT: 14.6 % (ref 11.6–15.1)
GFR SERPL CREATININE-BSD FRML MDRD: 15 ML/MIN/1.73SQ M
GLUCOSE P FAST SERPL-MCNC: 100 MG/DL (ref 65–99)
GLUCOSE SERPL-MCNC: 100 MG/DL (ref 65–140)
GLUCOSE SERPL-MCNC: 93 MG/DL (ref 65–140)
GLUCOSE SERPL-MCNC: 94 MG/DL (ref 65–140)
HCT VFR BLD AUTO: 25.9 % (ref 36.5–49.3)
HGB BLD-MCNC: 8.7 G/DL (ref 12–17)
IMM GRANULOCYTES # BLD AUTO: 0.03 THOUSAND/UL (ref 0–0.2)
IMM GRANULOCYTES NFR BLD AUTO: 1 % (ref 0–2)
LYMPHOCYTES # BLD AUTO: 1.47 THOUSANDS/ΜL (ref 0.6–4.47)
LYMPHOCYTES NFR BLD AUTO: 24 % (ref 14–44)
MCH RBC QN AUTO: 27.4 PG (ref 26.8–34.3)
MCHC RBC AUTO-ENTMCNC: 33.6 G/DL (ref 31.4–37.4)
MCV RBC AUTO: 82 FL (ref 82–98)
MONOCYTES # BLD AUTO: 0.73 THOUSAND/ΜL (ref 0.17–1.22)
MONOCYTES NFR BLD AUTO: 12 % (ref 4–12)
NEUTROPHILS # BLD AUTO: 3.69 THOUSANDS/ΜL (ref 1.85–7.62)
NEUTS SEG NFR BLD AUTO: 59 % (ref 43–75)
NRBC BLD AUTO-RTO: 0 /100 WBCS
PLATELET # BLD AUTO: 159 THOUSANDS/UL (ref 149–390)
PMV BLD AUTO: 10.3 FL (ref 8.9–12.7)
POTASSIUM SERPL-SCNC: 4.7 MMOL/L (ref 3.5–5.3)
RBC # BLD AUTO: 3.17 MILLION/UL (ref 3.88–5.62)
SODIUM SERPL-SCNC: 137 MMOL/L (ref 135–147)
WBC # BLD AUTO: 6.15 THOUSAND/UL (ref 4.31–10.16)

## 2024-11-11 PROCEDURE — 82948 REAGENT STRIP/BLOOD GLUCOSE: CPT

## 2024-11-11 PROCEDURE — 97535 SELF CARE MNGMENT TRAINING: CPT

## 2024-11-11 PROCEDURE — 99232 SBSQ HOSP IP/OBS MODERATE 35: CPT | Performed by: FAMILY MEDICINE

## 2024-11-11 PROCEDURE — 97110 THERAPEUTIC EXERCISES: CPT

## 2024-11-11 PROCEDURE — 97112 NEUROMUSCULAR REEDUCATION: CPT

## 2024-11-11 PROCEDURE — 99232 SBSQ HOSP IP/OBS MODERATE 35: CPT | Performed by: PHYSICIAN ASSISTANT

## 2024-11-11 PROCEDURE — 94660 CPAP INITIATION&MGMT: CPT

## 2024-11-11 PROCEDURE — 85025 COMPLETE CBC W/AUTO DIFF WBC: CPT | Performed by: NURSE PRACTITIONER

## 2024-11-11 PROCEDURE — 99232 SBSQ HOSP IP/OBS MODERATE 35: CPT | Performed by: INTERNAL MEDICINE

## 2024-11-11 PROCEDURE — 80048 BASIC METABOLIC PNL TOTAL CA: CPT | Performed by: NURSE PRACTITIONER

## 2024-11-11 RX ORDER — NIFEDIPINE 30 MG/1
60 TABLET, EXTENDED RELEASE ORAL DAILY
Status: DISCONTINUED | OUTPATIENT
Start: 2024-11-11 | End: 2024-11-18 | Stop reason: HOSPADM

## 2024-11-11 RX ADMIN — HEPARIN SODIUM 7500 UNITS: 5000 INJECTION INTRAVENOUS; SUBCUTANEOUS at 21:16

## 2024-11-11 RX ADMIN — ASPIRIN 81 MG: 81 TABLET, COATED ORAL at 08:30

## 2024-11-11 RX ADMIN — BUPROPION HYDROCHLORIDE 100 MG: 100 TABLET, FILM COATED ORAL at 08:30

## 2024-11-11 RX ADMIN — LORATADINE 5 MG: 10 TABLET ORAL at 08:30

## 2024-11-11 RX ADMIN — LABETALOL HYDROCHLORIDE 300 MG: 100 TABLET, FILM COATED ORAL at 21:16

## 2024-11-11 RX ADMIN — FLUTICASONE PROPIONATE 1 SPRAY: 50 SPRAY, METERED NASAL at 08:30

## 2024-11-11 RX ADMIN — MELATONIN TAB 3 MG 3 MG: 3 TAB at 21:16

## 2024-11-11 RX ADMIN — PANTOPRAZOLE SODIUM 40 MG: 40 TABLET, DELAYED RELEASE ORAL at 05:50

## 2024-11-11 RX ADMIN — POLYETHYLENE GLYCOL 3350 17 G: 17 POWDER, FOR SOLUTION ORAL at 08:31

## 2024-11-11 RX ADMIN — HEPARIN SODIUM 7500 UNITS: 5000 INJECTION INTRAVENOUS; SUBCUTANEOUS at 05:51

## 2024-11-11 RX ADMIN — HYDRALAZINE HYDROCHLORIDE 10 MG: 10 TABLET ORAL at 15:28

## 2024-11-11 RX ADMIN — VALBENAZINE 40 MG: 40 CAPSULE ORAL at 21:17

## 2024-11-11 RX ADMIN — LABETALOL HYDROCHLORIDE 300 MG: 100 TABLET, FILM COATED ORAL at 08:30

## 2024-11-11 RX ADMIN — LACTULOSE 20 G: 20 SOLUTION ORAL at 17:07

## 2024-11-11 RX ADMIN — DOCUSATE SODIUM 100 MG: 100 CAPSULE, LIQUID FILLED ORAL at 17:06

## 2024-11-11 RX ADMIN — SENNOSIDES 17.2 MG: 8.6 TABLET, FILM COATED ORAL at 21:16

## 2024-11-11 RX ADMIN — OLANZAPINE 10 MG: 2.5 TABLET, FILM COATED ORAL at 21:16

## 2024-11-11 RX ADMIN — HYDRALAZINE HYDROCHLORIDE 10 MG: 10 TABLET ORAL at 21:16

## 2024-11-11 RX ADMIN — BUPROPION HYDROCHLORIDE 100 MG: 100 TABLET, FILM COATED ORAL at 17:06

## 2024-11-11 RX ADMIN — HEPARIN SODIUM 7500 UNITS: 5000 INJECTION INTRAVENOUS; SUBCUTANEOUS at 15:28

## 2024-11-11 RX ADMIN — NIFEDIPINE 60 MG: 30 TABLET, FILM COATED, EXTENDED RELEASE ORAL at 08:30

## 2024-11-11 RX ADMIN — TRAZODONE HYDROCHLORIDE 50 MG: 50 TABLET ORAL at 21:16

## 2024-11-11 RX ADMIN — DOCUSATE SODIUM 100 MG: 100 CAPSULE, LIQUID FILLED ORAL at 08:30

## 2024-11-11 RX ADMIN — HYDRALAZINE HYDROCHLORIDE 10 MG: 10 TABLET ORAL at 05:50

## 2024-11-11 RX ADMIN — ATORVASTATIN CALCIUM 40 MG: 40 TABLET, FILM COATED ORAL at 17:06

## 2024-11-11 NOTE — ASSESSMENT & PLAN NOTE
Lab Results   Component Value Date    HGBA1C 8.6 (H) 10/16/2024   Blood Sugar Average: Last 72 hrs:  (P) 108  Continue on insulin  most recent A1c 8.6% as of October 2024  Advised of tight glycemic control

## 2024-11-11 NOTE — PLAN OF CARE
Problem: SAFETY ADULT  Goal: Patient will remain free of falls  Description: INTERVENTIONS:  - Educate patient/family on patient safety including physical limitations  - Instruct patient to call for assistance with activity   - Consult OT/PT to assist with strengthening/mobility   - Keep Call bell within reach  - Keep bed low and locked with side rails adjusted as appropriate  - Keep care items and personal belongings within reach  - Initiate and maintain comfort rounds  - Make Fall Risk Sign visible to staff  - Offer Toileting every 2-4 Hours, in advance of need  - Initiate/Maintain bed alarm  - Obtain necessary fall risk management equipment: maatts  - Apply yellow socks and bracelet for high fall risk patients  - Consider moving patient to room near nurses station  Outcome: Progressing

## 2024-11-11 NOTE — ASSESSMENT & PLAN NOTE
Lab Results   Component Value Date    EGFR 15 11/11/2024    EGFR 17 11/08/2024    EGFR 17 11/06/2024    CREATININE 3.91 (H) 11/11/2024    CREATININE 3.68 (H) 11/08/2024    CREATININE 3.65 (H) 11/06/2024     Creatinine currently 3.91 from 3.68.  Baseline creatinine 2.5-2.9.    Avoid nephrotoxins.  Home diuretics currently on hold.  Encourage hydration.  Renal ultrasound demonstrates no hydro but mild bladder wall thickening.   Nephrology following.  Labs MWF per Nephro.  Follows with Dr. No (Nephrology) as outpatient.

## 2024-11-11 NOTE — CASE MANAGEMENT
Cm met with pt during therapy session, per OT, pt and family interested in [email or web address] [email or web address]. Cm let pt know and left copy of [employer] - how to [facility] judy in room and post it with cm number to call once completed so we can fax it in for family to expedite process.

## 2024-11-11 NOTE — PROGRESS NOTES
11/11/24 0700   Pain Assessment   Pain Assessment Tool 0-10   Pain Score No Pain   Patient's Stated Pain Goal No pain   Restrictions/Precautions   Precautions Bed/chair alarms;Fall Risk;Cognitive;Supervision on toilet/commode   Braces or Orthoses Other (Comment)  (compression stockings, abdominal binder)   Lifestyle   Autonomy Pt pleasant and cooperative.   Reciprocal Relationships family   Service to Others Retired working in [employer]   Intrinsic Gratification TV   Eating   Type of Assistance Needed Set-up / clean-up   Physical Assistance Level No physical assistance   Comment breakfast meal, assist to open containers   Eating CARE Score 5   Oral Hygiene   Type of Assistance Needed Verbal cues   Physical Assistance Level No physical assistance   Comment reminder during set-up to place the toothpaste on his tongue not brush. Pt able to open toothpaste and mouth wash one-handed.   Oral Hygiene CARE Score 4   Shower/Bathe Self   Type of Assistance Needed Physical assistance   Physical Assistance Level 25% or less   Comment min A for right axilla, right buttocks   Shower/Bathe Self CARE Score 3   Upper Body Dressing   Type of Assistance Needed Physical assistance   Physical Assistance Level 26%-50%   Comment assist to thread right UE and untwist the shirt down over right side   Upper Body Dressing CARE Score 3   Lower Body Dressing   Type of Assistance Needed Physical assistance   Physical Assistance Level 26%-50%   Comment Pt able to thread bilateral legs with CGA and VC's for where to place hands to pull pants on, min A in stance to hike pants over right hip. TA for tab brief despite training in donning. Pt may have more success with a incontinence pad inside regular underwear at home.   Lower Body Dressing CARE Score 3   Putting On/Taking Off Footwear   Type of Assistance Needed Physical assistance   Physical Assistance Level 76% or more   Comment TA TEDs, once toes placed into shoe Pt able to step in the rest of  the way, dependent to tie.   Putting On/Taking Off Footwear CARE Score 2   Picking Up Object   Type of Assistance Needed Physical assistance   Physical Assistance Level 26%-50%   Comment Pt forward flexed to grab wash cloth from on top of his foot in stance with min A for balance   Picking Up Object CARE Score 3   Lying to Sitting on Side of Bed   Type of Assistance Needed Physical assistance   Physical Assistance Level 26%-50%   Comment min A to right his trunk when exiting right side of bed. He was able to use RUE forearm prop but then unable to extend the elbow to assisting with righting.   Lying to Sitting on Side of Bed CARE Score 3   Sit to Stand   Type of Assistance Needed Physical assistance   Physical Assistance Level 25% or less   Comment min A to CGA by end of session   Sit to Stand CARE Score 3   Bed-Chair Transfer   Type of Assistance Needed Physical assistance   Physical Assistance Level 25% or less   Comment min A/CGA with no AD, gait belt   Chair/Bed-to-Chair Transfer CARE Score 3   Toileting Hygiene   Type of Assistance Needed Physical assistance   Physical Assistance Level 26%-50%   Comment Pt able to utilize the urinal without spilling while in bed with brief loose enough to not need to remove it, however then spilled the urinal on the floor trying to replace it on the bedside table.   Toileting Hygiene CARE Score 3   Functional Standing Tolerance   Time 4min   Activity LB selfcare tasks and grooming at the sink   Cognition   Overall Cognitive Status Impaired   Arousal/Participation Alert;Cooperative   Attention Attends with cues to redirect   Orientation Level Oriented X4   Memory Decreased short term memory   Following Commands Follows one step commands inconsistently   Activity Tolerance   Activity Tolerance Patient tolerated treatment well   Assessment   Treatment Assessment Pt participated in 60min OT session primarily focused on selfcare performance and carryover of hemidressing techniques.  Pt continues to require task segmentation and verbal cues to perform dressing tasks. He continues to benefit from skilled OT interventions to improve standing balance, incorporation of RUE as a stabilizing assist, carryover of estefania-techniques for selfcare performance, and NMR of RUE.   Prognosis Good   Problem List Decreased strength;Decreased endurance;Impaired balance;Decreased coordination;Decreased cognition;Impaired judgement;Decreased safety awareness   Barriers to Discharge Inaccessible home environment;Decreased caregiver support   Plan   Treatment/Interventions ADL retraining;Functional transfer training;Therapeutic exercise;Endurance training;Patient/family training;Compensatory technique education   OT Therapy Minutes   OT Time In 0700   OT Time Out 0830   OT Total Time (minutes) 90   OT Mode of treatment - Individual (minutes) 90   OT Mode of treatment - Concurrent (minutes) 0   OT Mode of treatment - Group (minutes) 0   OT Mode of treatment - Co-treat (minutes) 0   OT Mode of Treatment - Total time(minutes) 90 minutes   OT Cumulative Minutes 1150   Therapy Time missed   Time missed? No

## 2024-11-11 NOTE — ASSESSMENT & PLAN NOTE
Hgb currently 8.7.  Hemoglobin 12.1 on admission.   Most recent iron panel on 10/15: Iron Sat 38%, TIBC 204, Iron 77, and Ferritin 111.  No need for iron supplementation at this time.  FOBT 2 out of 3 negative.  3rd to be obtained.  Completed 3 doses of IV Venofer per Nephrology recs.  Continue to trend routine CBC.

## 2024-11-11 NOTE — ASSESSMENT & PLAN NOTE
Stool for occult blood negative  SPEP negative for monoclonal gammopathy  Iron saturation 38% with a ferritin of 111.  Continue with Venofer.  KOBI relatively contraindicated given recent CVA.  Hgb remains below goal

## 2024-11-11 NOTE — ASSESSMENT & PLAN NOTE
Lab Results   Component Value Date    EGFR 15 11/11/2024    EGFR 17 11/08/2024    EGFR 17 11/06/2024    CREATININE 3.91 (H) 11/11/2024    CREATININE 3.68 (H) 11/08/2024    CREATININE 3.65 (H) 11/06/2024   Stage 4  Follows with Dr. Oneal salcedo

## 2024-11-11 NOTE — ASSESSMENT & PLAN NOTE
Lab Results   Component Value Date    EGFR 15 11/11/2024    EGFR 17 11/08/2024    EGFR 17 11/06/2024    CREATININE 3.91 (H) 11/11/2024    CREATININE 3.68 (H) 11/08/2024    CREATININE 3.65 (H) 11/06/2024     Recent creatinine of 3.91 11/11 Prior baseline around 2.9-3.0  Etiology felt to be related potentially to ATN in the setting of uncontrolled hypertension and complicated by contrast associated nephropathy  Nephrology was consulted and followed and a UA showed microhematuria and proteinuria.  An ultrasound of the kidney/bladder showed wall thickening but no hydronephrosis  Bladder scans negative for retention  Monitor BMP triweekly or sooner if clinically indicated  Demadex has been on hold and had periodically required IV fluids  Nephrology following; will follow up further recommendations given worsening kidney function, encourage PO   Follow with nephrology as an outpatient or sooner on the ARC if any acute changes

## 2024-11-11 NOTE — ASSESSMENT & PLAN NOTE
Presented with hypertensive emergency on initial hospitalization.   Concerns about medication non-compliance.  Home regimen: labetalol 100mg BID and amlodipine 10mg daily.  Currently on labetalol 300mg BID, Nifedipine 90mg daily, and hydralazine 10mg Q8 hours.  Decrease Nifedipine to 60mg daily today.  Nephrology following.

## 2024-11-11 NOTE — ASSESSMENT & PLAN NOTE
Lab Results   Component Value Date    HGBA1C 8.6 (H) 10/16/2024       Recent Labs     11/10/24  0609 11/10/24  1620 11/10/24  2103 11/11/24  0633   POCGLU 105 106 111 93     Most recent hemoglobin A1c of 8.6 and technically uncontrolled although blood sugars in the hospital have been well-controlled  Currently on semaglutide as an outpatient but was on hold due to ROSINA in the hospital  Continue sliding scale and 4 times daily Accu-Cheks and as well as a diabetic diet

## 2024-11-11 NOTE — PROGRESS NOTES
"   11/11/24 1400   Pain Assessment   Pain Assessment Tool 0-10   Pain Score No Pain   Restrictions/Precautions   Precautions Bed/chair alarms;Fall Risk;Cognitive;Supervision on toilet/commode   Weight Bearing Restrictions No   ROM Restrictions No   Cognition   Overall Cognitive Status Impaired   Arousal/Participation Alert;Cooperative   Attention Attends with cues to redirect   Orientation Level Oriented X4   Memory Decreased short term memory   Following Commands Follows one step commands inconsistently   Subjective   Subjective \"I'm ready for therapy!\"   Sit to Stand   Type of Assistance Needed Incidental touching   Physical Assistance Level No physical assistance   Comment CGAx1 no AD   Sit to Stand CARE Score 4   Bed-Chair Transfer   Type of Assistance Needed Incidental touching   Physical Assistance Level No physical assistance   Comment CGAx1 no AD   Chair/Bed-to-Chair Transfer CARE Score 4   Transfer Bed/Chair/Wheelchair   Limitations Noted In Balance;Coordination;Endurance;LE Strength   Adaptive Equipment None   Stand Pivot Contact Guard   Sit to Stand Contact Guard   Stand to Sit Contact Guard   Walk 10 Feet   Type of Assistance Needed Incidental touching   Physical Assistance Level No physical assistance   Comment CGAx1/close supervision no AD   Walk 10 Feet CARE Score 4   Walk 50 Feet with Two Turns   Type of Assistance Needed Incidental touching   Physical Assistance Level No physical assistance   Comment CGAx1/close supervision no AD   Walk 50 Feet with Two Turns CARE Score 4   Walk 150 Feet   Type of Assistance Needed Incidental touching   Physical Assistance Level No physical assistance   Comment CGAx1 no AD with longer distances and due to fatigue   Walk 150 Feet CARE Score 4   Ambulation   Primary Mode of Locomotion Prior to Admission Walk   Distance Walked (feet) 150 ft  (3x70ft CGAx1/close supervision no AD)   Gait Pattern R foot drag;R hemiparesis;Decreased R stance;Improper weight " shift;Inconsistant Lesley   Limitations Noted In Balance;Coordination;Sequencing;Safety   Provided Assistance with: Balance;Direction   Walk Assist Level Contact Guard   Findings Orlando displayed poor weightshift to his hemiparetic side during ambulation today with intermittent R foot drag during swing phase. He is CGAx1/close supervision during short distances with no AD. Gait belt donned during ambulation and transfers. TEDs donned, no abdominal binder.   Does the patient walk? 2. Yes   Wheel 50 Feet with Two Turns   Type of Assistance Needed Supervision   Physical Assistance Level No physical assistance   Wheel 50 Feet with Two Turns CARE Score 4   Wheel 150 Feet   Comment (S)  Has set mod I goals for self propulsion however pt most likely to be household ambulator at [place].   Wheelchair mobility   Does the patient use a wheelchair? 1. Yes   Type of Wheelchair Used 1. Manual   Method Left upper extremity;Left lower extremity;Right lower extremity   Distance Level Surface (feet) 50 ft   Findings Patient able to propel using LUE and Chito LE 50 ft with one turn at the end of session prior to needing assistance due to fatigue. _- Pt has mod I WC goals set however most likely to be a household ambulator.   Therapeutic Interventions   Strengthening Seated TE: 2.5# LAQ 2x10 Chito, marches 2x10 Chito,  Abduction green TB 2x15, DF/PF green TB 2x10 RLE only, HS curls green TB 2x10 Chito   Balance Standing balance on foam, feet shoulder width apart: balloon taps 3x20s with repeated posterior LOB requiring min-mod Ax1 to recover; Standing balance cone  and hand off with weight shift to each side 2x6 cones with posterior LOB when shifting toward the R side at end of activity requiring Min-Mod Ax1.   Neuromuscular Re-Education 8x10 ft forward ambulation over 4 SPC recip; 8x10 ft forward ambulation over 4 SPC 1 quad can recip; Side stepping 2x10 ft over SPC ea side; STS with 4 in block under LLE and use of L hand 1x10, 1x5   Other  Comments   Comments 5xSTS use of LUE: 16.02s; no UE: 16.8s   Assessment   Treatment Assessment Orlando participated in a 90 minute skilled physical therapy session today focused on neuro recovery, ambulation, balance and LE strengthening. TEDs were donned throughout session, but no abdominal binder. Orlando's BP dropped when going from standing to sitting, but he was asymptomatic, and it hailey again with activity. Orlando was able to complete STS focusing on putting weight through the RLE to increase WB and stance time. Orlando was challenged by balance activities and continues to display posterior LOB when weight shifting or with reactive balance. Orlando would benefit from continued dynamic balance training and exercises targeting weight shift onto the hemiparetic RLE and RLE strength during swing phase. DC plan for Orlando is to return home 11/18 with his wife. Orlando has supervision goals and would benefit from trialing close supervision assist while ambulating short distances. Family training is set for Friday with anticipation of DC home next week. Pt anticipated to be household ambulator at home so Mod I wheelchair goals may not be necessary for return to home.   Plan   Progress Progressing toward goals   PT Therapy Minutes   PT Time In 1400   PT Time Out 1530   PT Total Time (minutes) 90   PT Mode of treatment - Individual (minutes) 90   PT Mode of treatment - Concurrent (minutes) 0   PT Mode of treatment - Group (minutes) 0   PT Mode of treatment - Co-treat (minutes) 0   PT Mode of Treatment - Total time(minutes) 90 minutes   PT Cumulative Minutes 1345   Therapy Time missed   Time missed? No

## 2024-11-11 NOTE — ASSESSMENT & PLAN NOTE
Lab Results   Component Value Date    HGBA1C 8.6 (H) 10/16/2024       Recent Labs     11/10/24  0609 11/10/24  1620 11/10/24  2103 11/11/24  0633   POCGLU 105 106 111 93       Blood Sugar Average: Last 72 hrs:  (P) 109.75    Had not been taking anything at home.  Ran out of Ozempic a few months ago.  BG has been well controlled.  Continue cons. carb diet and BID accuchecks.

## 2024-11-11 NOTE — PROGRESS NOTES
Progress Note - PMR   Name: Tommie Taylor 59 y.o. male I MRN: [id number]  Unit/Bed#: [facility] 261-01 I Date of Admission: 10/26/2024   Date of Service: 11/11/2024 I Hospital Day: 16     Assessment & Plan  CVA (cerebral vascular accident) (Formerly [facility])  Patient with uncontrolled hypertension and diabetes presents with right upper and right lower extremity weakness as well as facial droop  A CT of the head as well as a CTA of the head and neck were completed with negative for acute abnormalities for an acute process  TNK had been administered following the correction of his hypertension after being placed on a Cardene drip  After ministration of the TNK developed worsening dysarthria as well as headache and a repeat CT was still negative.  The 24 post TNK CT was also negative  Neurology followed the patient and MRI was completed and was significant for left corona radiata stroke  Placed on dual antiplatelet therapy for 3 weeks with plans for monotherapy with aspirin and statin indefinitely for secondary stroke prophylaxis (10/15-11/5) started on ASA and statin monotherapy   Recommendation for a Zio patch as an outpatient  Follow-up with neurovascular attending in 6 to 8 weeks  Physical, occupational and speech therapy while on the acute rehabilitation unit  Episode of worsening RLE weakness,CT 11/4 no acute changes.   Bipolar I disorder, severe, current or most recent episode depressed, with psychotic features (Formerly [facility])  History of chronic bipolar 1 disorder and follows with outpatient psychiatry and was seen inpatient  Mood has been stable and is maintained on Zyprexa, bupropion and trazodone as well as Ingrezza for tardive dyskinesia  Follow-up with psychiatry as an outpatient and consider virtual consultation if indicated for any changes while on ARC  GERD (gastroesophageal reflux disease)  Continue Protonix as well as as needed Tums  Insomnia  Continue trazodone and consider sleep logs  Encourage use of CPAP   DAISY (obstructive  sleep apnea)  Continue CPAP with home settings  Ordered and compliant per records  Anemia, unspecified  Hemoglobin most recently of 10.4 which is up from 9.3 but has been hovering in the 10-11 range for most of admission  FOBT: neg x3 10/29,11/1,11/4  Started on venofer per nephrology x3 completed   Continue to monitor with biweekly CBC or sooner if clinically indicated  Hyperlipidemia  Continue Lipitor 40 mg every evening  Class 3 severe obesity due to excess calories with serious comorbidity and body mass index (BMI) of 40.0 to 44.9 in adult ([facility])  Continue with exercise and promote lifestyle modification, weight loss counseling and management of medical conditions to optimize status  Tardive dyskinesia  Continue Ingrezza 40 mg at bedtime  Hypertension  Presented to the hospital significantly elevated blood pressure with systolic ranging from 197-231  Was placed on a Cardizem drip initially for hypertensive emergency  Had been on Demadex 20 mg daily but was on hold due to the increasing creatinine  Cw labetalol 300 mg BID , nifedipine 90 mg daily dec to 60 mg daily and hydralazine 25mg Q8h- hydral dec to 10 mg Q8h,   Goals for normotension  Mgmt per IM  Follow-up with nephrology as an outpatient  DM2 (diabetes mellitus, type 2) ([facility])  Lab Results   Component Value Date    HGBA1C 8.6 (H) 10/16/2024       Recent Labs     11/10/24  0609 11/10/24  1620 11/10/24  2103 11/11/24  0633   POCGLU 105 106 111 93     Most recent hemoglobin A1c of 8.6 and technically uncontrolled although blood sugars in the hospital have been well-controlled  Currently on semaglutide as an outpatient but was on hold due to ROSINA in the hospital  Continue sliding scale and 4 times daily Accu-Cheks and as well as a diabetic diet    Acute kidney injury superimposed on stage 4 chronic kidney disease ([facility])  Lab Results   Component Value Date    EGFR 15 11/11/2024    EGFR 17 11/08/2024    EGFR 17 11/06/2024    CREATININE 3.91 (H) 11/11/2024     CREATININE 3.68 (H) 11/08/2024    CREATININE 3.65 (H) 11/06/2024     Recent creatinine of 3.91 11/11 Prior baseline around 2.9-3.0  Etiology felt to be related potentially to ATN in the setting of uncontrolled hypertension and complicated by contrast associated nephropathy  Nephrology was consulted and followed and a UA showed microhematuria and proteinuria.  An ultrasound of the kidney/bladder showed wall thickening but no hydronephrosis  Bladder scans negative for retention  Monitor BMP triweekly or sooner if clinically indicated  Demadex has been on hold and had periodically required IV fluids  Nephrology following; will follow up further recommendations given worsening kidney function, encourage PO   Follow with nephrology as an outpatient or sooner on the ARC if any acute changes  Encephalopathy  Had lethargy on exam back on 10/17 in acute care with improvements however more recently had issues with confusion and decreased consciousness in the mornings that improves throughout the day with unclear etiology  Prior history of cognitive impairments with last MoCA score of 18  CT of the head was stable, B12 level (446) wnl  Was evaluated by psychiatry with no changes in medications recommended  EEG showed no epileptiform activity just moderate nonspecific dysfunction  Will need to monitor especially with change in environment now on the ARC for any changes  Impaired mobility and activities of daily living  Patient was evaluated by the rehabilitation team MD and an appropriate candidate for acute inpatient rehabilitation program at this time.  The patient will tolerate 3 hours/day 5 to 7 days/week of intensive physical, occupational and speech therapy in order to obtain goals for community discharge  Due to the patient's functional Compared to their baseline level of function in addition to their ongoing medical needs, the patient would benefit from daily supervision from a rehabilitation physician as well as  rehabilitation nursing to implement and adjust the medical as well as functional plan of care in order to meet the patient's goals.  DC: 11/18 homecare PT/OT/SLP/RN/HHA  At risk for alteration in bowel function  Constipated no bm for several days; lactulose PRN  Start colase, senna daily and miralax prn  Titrate as needed   Overgrown toenails  Cs podiatry   Vitamin D deficiency  <7 10/7  Mgmt per IM/nephrology   Fall during current hospitalization  Patient with fall to knees at shift change 10/31   Denied any knee pain  Get larger bariatric chair   Fall precautions.   Orthostatic hypotension  Noted to have orthostatic hypotension during therapy   Apply TEDs/Binder if available for patient size.   Fall precautions  Monitor anemia as above   BP medication titration per IM     History of Present Illness   Tommie Taylor is a 59 y.o. male with history of bipolar disorder, CKD stage IV, type 2 diabetes, hypertension, tardive dyskinesia, sleep apnea, GERD who presented to the [facility] on 10/14/2024 for right-sided weakness and facial droop.  MRI revealed a left corona radiata infarct and neurology was consulted and was administered TNK.  He initially required a Cardene drip for his hypertensive emergency and was eventually placed on dual antiplatelet therapy with plan to continue aspirin and Plavix until that date and continue with aspirin as monotherapy as well as atorvastatin for secondary stroke prophylaxis.  He was recommended for Zio patch placement as an outpatient.  His hospital course was complicated by acute kidney injury on top of his CKD thought to be related to ATN in the setting of uncontrolled blood pressure as well as contrast associated nephropathy.  Nephrology did follow during his course and had some levels of improvement however has not returned back to his baseline level of creatinine.  Additionally there was some levels of encephalopathy which are stable at this time. The  patient was evaluated by the Rehabilitation team and deemed an appropriate candidate for comprehensive inpatient rehabilitation and admitted to the [facility] on 10/26/2024  5:13 PM     Rehab Diagnosis: Impairment of mobility, safety, Activities of Daily Living (ADLs), and cognitive/communication skills due to Stroke:  01.2  Right Body Involvement (Left Brain)  Etiologic Dx: Left Corona Radiata Infarct  Date of Onset: 10/14/2024   Date of surgery: N/A  Chief Complaint: loose stools    Interval: Patient seen and examined in room. No events overnight.  Reports overall feeling well. Last BM 11/6. Reporting loose stools however per nurse patient did not have BM all weekend. Sleeping well at night. Denies any f/c/n/v, CP, SOB, abdominal pain, constipation, or diarrhea.       Objective   Functional Update:  Physical Therapy Occupational Therapy Speech Therapy   Weight Bearing Status: Full Weight Bearing  Transfers: Minimal Assistance  Bed Mobility: Incidental Touching  Amulation Distance (ft): 150 feet  Ambulation: Moderate Assistance, Incidental Touching, Minimal Assistance (Pt progressing but fluctuates, also BP has been dropping)  Assistive Device for Ambulation:  (no device)  Wheelchair Mobility Distance: 50 ft  Wheelchair Mobility: Minimal Assistance  Number of Stairs: 12  Assistive Device for Stairs: Lehft Hand Rail  Stair Assistance: Minimal Assistance  Ramp: Minimal Assistance  Discharge Recommendations: Home with:  DC Home with:: Family Support, 24 Hour Supervision   Eating: Supervision (set-up)  Grooming: Supervision  Bathing: Moderate Assistance  Bathing: Moderate Assistance  Upper Body Dressing: Minimal Assistance  Lower Body Dressing: Minimal Assistance (footwear mod A)  Toileting: Moderate Assistance (intermittent incontinence of urine, having BM infrequently whihc he reports is baseline)  Tub/Shower Transfer: Minimal Assistance  Toilet Transfer: Minimal Assistance  Cognition: Exceptions to WNL  Cognition:  Decreased Memory, Decreased Executive Functions, Decreased Attention, Decreased Safety, Decreased Comprehension  Orientation: Person, Place, Time, Situation   Mode of Communication: Verbal  Speech/Language: Dysarthia  Cognition: Exceptions to WNL  Cognition: Decreased Memory, Decreased Executive Functions, Decreased Attention, Decreased Comprehension, Decreased Safety  Orientation: Person, Place, Time, Situation  Discharge Recommendations: Home with:  DC Home with:: 24 Hour Supervision, 24 Hour Assisteance, Family Support, Home Speech Therapy, Outpatient Speech Therapy         Temp:  [96.6 °F (35.9 °C)-97.4 °F (36.3 °C)] 96.9 °F (36.1 °C)  HR:  [64-72] 72  Resp:  [18-20] 20  BP: (130-135)/(64-72) 135/70  SpO2:  [95 %-99 %] 95 %    Physical Exam    Gen: No acute distress, obese  HEENT: Moist mucus membranes, Normocephalic/Atraumatic  Cardiovascular: Regular rate, rhythm,  Heme/Extr: bilateral LE edema  Pulmonary: Non-labored breathing. Lungs CTAB  : No amezquita  GI:  non-distended. BS+  MSK: R sided weakness.   Integumentary: Skin is warm, dry. .  Neuro:  dysarthric, Appropriate to questioning.  Psych: Normal mood and affect.       Scheduled Meds:  Current Facility-Administered Medications   Medication Dose Route Frequency Provider Last Rate    acetaminophen  650 mg Oral Q6H PRN Alex S George, DO      aspirin  81 mg Oral Daily Alex S George, DO      atorvastatin  40 mg Oral QPM Alex S George, DO      buPROPion  100 mg Oral BID Alex S George, DO      calcium carbonate  1,000 mg Oral BID PRN Alex S Ariel, DO      docusate sodium  100 mg Oral BID Cathy Miranda MD      fluticasone  1 spray Each Nare Daily SAMANTHA Kern      heparin (porcine)  7,500 Units Subcutaneous Q8H MIGEL Alex S Ariel, DO      hydrALAZINE  10 mg Oral Q8H [facility] SAMANTHA Kern      labetalol  300 mg Oral BID Alex S Ariel, DO      lactulose  20 g Oral TID PRN Cathy Miranda MD      loratadine  5 mg Oral Daily Joe Soto  Jose, CRNP      melatonin  3 mg Oral HS Joe Mcintoshsherice Garcia, CRNP      NIFEdipine  60 mg Oral Daily Joe Garcia, CRNP      OLANZapine  10 mg Oral HS Alex S George, DO      ondansetron  4 mg Oral Q6H PRN Joe Mcintoshsherice Garcia, CRNP      pantoprazole  40 mg Oral Early Morning Alex S George, DO      polyethylene glycol  17 g Oral Daily Cathy Miranda MD      senna  2 tablet Oral HS Cathy Miranda MD      traZODone  50 mg Oral HS PRN Alex S George, DO      Valbenazine Tosylate  40 mg Oral HS Alex S George, DO           Lab Results: I have reviewed the following results:  Results from last 7 days   Lab Units 11/11/24  0548 11/06/24  0447   HEMOGLOBIN g/dL 8.7* 9.3*   HEMATOCRIT % 25.9* 27.1*   WBC Thousand/uL 6.15 5.16   PLATELETS Thousands/uL 159 170     Results from last 7 days   Lab Units 11/11/24  0548 11/08/24  0546 11/06/24  0447   BUN mg/dL 43* 36* 31*   SODIUM mmol/L 137 138 139   POTASSIUM mmol/L 4.7 4.7 4.5   CHLORIDE mmol/L 107 108 108   CREATININE mg/dL 3.91* 3.68* 3.65*              Cathy Miranda MD   Physical Medicine and Rehabilitation   11/11/24    I have spent a total time of 35 minutes in caring for this patient on the day of the visit/encounter including Counseling / Coordination of care, Documenting in the medical record, Reviewing / ordering tests, medicine, procedures  , and Communicating with other healthcare professionals .

## 2024-11-11 NOTE — ASSESSMENT & PLAN NOTE
ROSINA thought to be secondary to IAN 10/14/2024 + ischemic injury secondary to severe hemodynamic perturbations plus some component of prerenal azotemia with subsequent ATN.  Creatinine appears to have plateaued at approximately 3.5-3.7.  Current EGFR of 17.  Likely represents new baseline given previous advanced CKD with previous baseline creatinine near 3.0.    Noted nephrotic range proteinuria underlying disease most likely secondary to diabetic kidney disease.

## 2024-11-11 NOTE — PROGRESS NOTES
Progress Note - Internal Medicine   Name: Tommie Taylor 59 y.o. male I MRN: [id number]  Unit/Bed#: -01 I Date of Admission: 10/26/2024   Date of Service: 11/11/2024 I Hospital Day: 16    Assessment & Plan  CVA (cerebral vascular accident) (HCC)  Initially presented with R sided weakness and R facial droop.   CTH and CTA negative for acute process.   MRI showed left corona radiata stroke.   Received TNK.    Completed 3 weeks of DAPT on 11/6.  Continue with ASA 81mg daily.  Continue statin.    Neurovascular checks Q shift.  Maintain normotension.    Follow up with Neurovascular in 4-6 weeks as outpatient.  Recommending Zio patch/Loop.     PT/OT/ST per primary team.     Complaints of worsening RLE weakness on 11/4 after therapies - CTH stable.  Noted to have orthostasis - recommend obtaining higher BP to prevent symptoms.  Bipolar I disorder, severe, current or most recent episode depressed, with psychotic features ([facility])  Currently on bupropion 100mg BID and olanzapine 10mg at HS with valbenazine tosylate 40mg for tardive dyskinesia per home regimen. Follow up outpatient with psychiatry.   GERD (gastroesophageal reflux disease)  EGD with GI 10/14, directed to continue with Protonix. Tums available PRN for dyspepsia.   Insomnia  Educate and encourage on sleep hygiene. Continue Trazodone at HS for sleep.   DAISY (obstructive sleep apnea)  Continue use of CPAP with home settings.  Does not use CPAP at home - currently broken.  Recommend overnight oximetry prior to discharge.   Follow-up with Sleep Medicine as outpatient.  Anemia, unspecified  Hgb currently 8.7.  Hemoglobin 12.1 on admission.   Most recent iron panel on 10/15: Iron Sat 38%, TIBC 204, Iron 77, and Ferritin 111.  No need for iron supplementation at this time.  FOBT 2 out of 3 negative.  3rd to be obtained.  Completed 3 doses of IV Venofer per Nephrology recs.  Continue to trend routine CBC.  Hyperlipidemia  Most recent lipid panel 10/16/24:  Cholesterol 154, triglycerides 120, HDL 40, LDL 90.  Continue Lipitor 40mg daily, increased from home 20mg daily.  Class 3 severe obesity due to excess calories with serious comorbidity and body mass index (BMI) of 40.0 to 44.9 in adult ([facility])  BMI 42.74 on admission. Encourage lifestyle modifications and provide support for nutritional teaching. Consult placed to nutrition services during acute course.   Tardive dyskinesia  Continue on home treatment Valbenazine Tosylate 40mg HS.   Hypertension  Presented with hypertensive emergency on initial hospitalization.   Concerns about medication non-compliance.  Home regimen: labetalol 100mg BID and amlodipine 10mg daily.  Currently on labetalol 300mg BID, Nifedipine 90mg daily, and hydralazine 10mg Q8 hours.  Decrease Nifedipine to 60mg daily today.  Nephrology following.   DM2 (diabetes mellitus, type 2) ([facility])  Lab Results   Component Value Date    HGBA1C 8.6 (H) 10/16/2024       Recent Labs     11/10/24  0609 11/10/24  1620 11/10/24  2103 11/11/24  0633   POCGLU 105 106 111 93       Blood Sugar Average: Last 72 hrs:  (P) 109.75    Had not been taking anything at home.  Ran out of Ozempic a few months ago.  BG has been well controlled.  Continue cons. carb diet and BID accuchecks.    Acute kidney injury superimposed on stage 4 chronic kidney disease ([facility])  Lab Results   Component Value Date    EGFR 15 11/11/2024    EGFR 17 11/08/2024    EGFR 17 11/06/2024    CREATININE 3.91 (H) 11/11/2024    CREATININE 3.68 (H) 11/08/2024    CREATININE 3.65 (H) 11/06/2024     Creatinine currently 3.91 from 3.68.  Baseline creatinine 2.5-2.9.    Avoid nephrotoxins.  Home diuretics currently on hold.  Encourage hydration.  Renal ultrasound demonstrates no hydro but mild bladder wall thickening.   Nephrology following.  Labs MWF per Nephro.  Follows with Dr. No (Nephrology) as outpatient.  Encephalopathy  Lethargy and encephalopathic symptoms noted during acute course.  B12 and head CT WNL.  No subsequent episodes noted.   EEG on 10/22 consistent with moderate nonspecific cerebral dysfunction, no seizure activity.  Continue to monitor behavior and symptoms for signs of changes.   Most recent MoCA was  in 2024.  Per Psych - if continues to have delirium consider discontinuing Wellbutrin.  Concerns DAISY could also be contributing to encephalopathy per acute care.  Vitamin D deficiency  Vitamin D level <7 on 10/7.  Continue home vitamin D 50,000u weekly.  Continue to follow-up with Nephrology as outpatient.  Orthostatic hypotension  Orthostatic + on .  Continue to monitor orthostatics.  Apply abdominal binder/TEDs as needed.  Encourage PO hydration.    VTE Pharmacologic Prophylaxis:   Pharmacologic: Heparin  Mechanical VTE Prophylaxis in Place: Yes - sequential compression devices.    Current Length of Stay: 16 day(s)    Current Patient Status: Inpatient Rehab     Discharge Plan: As per primary team.    Code Status: Level 1 - Full Code    Subjective:   Pt examined while pt sitting in WC in pt room.  Currently has no complaints.  Denies any lightheadedness, dizziness, SOB, palpitations, or CP.  Denies any abdominal pain.  States that his last BM was about 2 days ago.  Reviewed labs this morning - encouraged to hydrate more.  Also discussed decreasing anti-hypertensives.    Objective:     Vitals:   Temp (24hrs), Av °F (36.1 °C), Min:96.6 °F (35.9 °C), Max:97.4 °F (36.3 °C)    Temp:  [96.6 °F (35.9 °C)-97.4 °F (36.3 °C)] 96.9 °F (36.1 °C)  HR:  [64-72] 72  Resp:  [18-20] 20  BP: (130-135)/(64-72) 135/70  SpO2:  [95 %-99 %] 95 %  Body mass index is 42.74 kg/m².     Review of Systems   Constitutional:  Negative for appetite change, chills, fatigue and fever.   HENT:  Negative for trouble swallowing.    Eyes:  Negative for visual disturbance.   Respiratory:  Negative for cough, shortness of breath, wheezing and stridor.    Cardiovascular:  Negative for chest pain, palpitations and leg swelling.    Gastrointestinal:  Negative for abdominal distention, abdominal pain, constipation, diarrhea, nausea and vomiting.        LBM 2 days ago per patient, LBM was on 11/6 per chart   Genitourinary:  Negative for difficulty urinating.   Musculoskeletal:  Negative for arthralgias, back pain and gait problem.   Neurological:  Positive for weakness (R sided weakness since stroke). Negative for dizziness, light-headedness, numbness and headaches.   Psychiatric/Behavioral:  Negative for dysphoric mood and sleep disturbance. The patient is not nervous/anxious.    All other systems reviewed and are negative.       Input and Output Summary (last 24 hours):       Intake/Output Summary (Last 24 hours) at 11/11/2024 0841  Last data filed at 11/11/2024 0839  Gross per 24 hour   Intake 1730 ml   Output 720 ml   Net 1010 ml       Physical Exam:     Physical Exam  Vitals and nursing note reviewed.   Constitutional:       General: He is not in acute distress.     Appearance: Normal appearance. He is obese. He is not ill-appearing.   HENT:      Head: Normocephalic and atraumatic.   Cardiovascular:      Rate and Rhythm: Normal rate and regular rhythm.      Pulses: Normal pulses.      Heart sounds: Normal heart sounds. No murmur heard.     No friction rub.   Pulmonary:      Effort: Pulmonary effort is normal. No respiratory distress.      Breath sounds: Normal breath sounds. No wheezing or rhonchi.   Abdominal:      General: Abdomen is flat. Bowel sounds are normal. There is no distension.      Palpations: Abdomen is soft. There is no mass.      Tenderness: There is no abdominal tenderness. There is no guarding or rebound.      Hernia: No hernia is present.   Musculoskeletal:      Cervical back: Normal range of motion and neck supple. No tenderness.      Right lower leg: Edema (+1 pitting edema) present.      Left lower leg: Edema (+1 pitting edema) present.   Skin:     General: Skin is warm and dry.      Capillary Refill: Capillary  refill takes less than 2 seconds.   Neurological:      Mental Status: He is alert and oriented to person, place, and time.      Cranial Nerves: Dysarthria and facial asymmetry present.      Motor: Weakness (R sided) present.   Psychiatric:         Mood and Affect: Mood normal.         Behavior: Behavior normal.         Additional Data:     Labs:    Results from last 7 days   Lab Units 11/11/24  0548   WBC Thousand/uL 6.15   HEMOGLOBIN g/dL 8.7*   HEMATOCRIT % 25.9*   PLATELETS Thousands/uL 159   SEGS PCT % 59   LYMPHO PCT % 24   MONO PCT % 12   EOS PCT % 3     Results from last 7 days   Lab Units 11/11/24  0548   SODIUM mmol/L 137   POTASSIUM mmol/L 4.7   CHLORIDE mmol/L 107   CO2 mmol/L 23   BUN mg/dL 43*   CREATININE mg/dL 3.91*   ANION GAP mmol/L 7   CALCIUM mg/dL 8.6   GLUCOSE RANDOM mg/dL 100         Results from last 7 days   Lab Units 11/11/24  0633 11/10/24  2103 11/10/24  1620 11/10/24  0609 11/09/24  1558 11/09/24  0556 11/08/24  1603 11/08/24  0618 11/07/24  2045 11/07/24  1602 11/07/24  1105 11/07/24  0536   POC GLUCOSE mg/dl 93 111 106 105 97 131 131 104 144* 89 135 105               Labs reviewed    Imaging:    Imaging reviewed    Recent Cultures (last 7 days):           Last 24 Hours Medication List:   Current Facility-Administered Medications   Medication Dose Route Frequency Provider Last Rate    acetaminophen  650 mg Oral Q6H PRN Alex S George, DO      aspirin  81 mg Oral Daily Alex S George, DO      atorvastatin  40 mg Oral QPM Alex S George, DO      buPROPion  100 mg Oral BID Alex S George, DO      calcium carbonate  1,000 mg Oral BID PRN Alex S George, DO      docusate sodium  100 mg Oral BID Cathy Miranda MD      fluticasone  1 spray Each Nare Daily SAMANTHA Kern      heparin (porcine)  7,500 Units Subcutaneous Q8H MIGEL Alex S George, DO      hydrALAZINE  10 mg Oral Q8H [facility] SAMANTHA Kern      labetalol  300 mg Oral BID Alex S George, DO      lactulose  20 g Oral TID  PRN Cathy Miranda MD      loratadine  5 mg Oral Daily SAMANTHA Kern      melatonin  3 mg Oral HS SAMANTHA Kern      NIFEdipine  60 mg Oral Daily SAMANTHA Kern      OLANZapine  10 mg Oral HS Alex George, DO      ondansetron  4 mg Oral Q6H PRN SAMANTHA Kern      pantoprazole  40 mg Oral Early Morning Alex George, DO      polyethylene glycol  17 g Oral Daily Cathy Miranda MD      senna  2 tablet Oral HS Cathy Miranda MD      traZODone  50 mg Oral HS PRN Alex George, DO      Valbenazine Tosylate  40 mg Oral HS Alex George, DO          M*Modal software was used to dictate this note.  It may contain errors with dictating incorrect words or incorrect spelling. Please contact the provider directly with any questions.

## 2024-11-11 NOTE — ASSESSMENT & PLAN NOTE
Continue with exercise and promote lifestyle modification, weight loss counseling and management of medical conditions to optimize status   No

## 2024-11-11 NOTE — PROGRESS NOTES
Progress Note - Nephrology   Name: Tommie Taylor 59 y.o. male I MRN: [id number]  Unit/Bed#: -01 I Date of Admission: 10/26/2024   Date of Service: 11/11/2024 I Hospital Day: 16     Assessment & Plan  Acute kidney injury superimposed on stage 4 chronic kidney disease (HCC)  ROSINA thought to be secondary to IAN 10/14/2024 + ischemic injury secondary to severe hemodynamic perturbations plus some component of prerenal azotemia with subsequent ATN.  Creatinine appears to have plateaued at approximately 3.5-3.7.  Current EGFR of 17.  Likely represents new baseline given previous advanced CKD with previous baseline creatinine near 3.0.    Noted nephrotic range proteinuria underlying disease most likely secondary to diabetic kidney disease.  Chronic kidney disease  Lab Results   Component Value Date    EGFR 15 11/11/2024    EGFR 17 11/08/2024    EGFR 17 11/06/2024    CREATININE 3.91 (H) 11/11/2024    CREATININE 3.68 (H) 11/08/2024    CREATININE 3.65 (H) 11/06/2024   Stage 4  Follows with Dr. No outpatient  CVA (cerebral vascular accident) ([facility])  Status post TNK administration  Management as per primary service and neurology.  Bipolar I disorder, severe, current or most recent episode depressed, with psychotic features ([facility])  Follow-up with psych  Currently on Zyprexa and Wellbutrin  Anemia, unspecified  Stool for occult blood negative  SPEP negative for monoclonal gammopathy  Iron saturation 38% with a ferritin of 111.  Continue with Venofer.  KOBI relatively contraindicated given recent CVA.  Hgb remains below goal  Hypertension  Initially with hypertensive emergency  Currently on labetalol 300 mg p.o. twice daily, nifedipine 60 mg p.o. daily, hydralazine 10 mg p.o. every 8hr  Recommend loop diuretic therapy as needed.  BP overall acceptable.   Orthostatic hypotension  Mild orthostasis noted.  No changes in his current regimen.  Chronic kidney disease-mineral and bone disorder (CKD-MBD)  Phosphorus stable 4.5 with  a calcium of 8.8.  DM2 (diabetes mellitus, type 2) ([facility])  Lab Results   Component Value Date    HGBA1C 8.6 (H) 10/16/2024   Blood Sugar Average: Last 72 hrs:  (P) 108  Continue on insulin  most recent A1c 8.6% as of October 2024  Advised of tight glycemic control  Impaired mobility and activities of daily living  Currently with wheelchair  Follow-up with [facility] rehab    Nephrology service will follow.    Subjective   Patient denies sob, urinary complaints, changes in appetite.  He states he is feeling well overall.     Objective :  Temp:  [96.8 °F (36 °C)-97.4 °F (36.3 °C)] 96.8 °F (36 °C)  HR:  [64-72] 70  BP: (123-170)/(64-86) 162/86  Resp:  [20] 20  SpO2:  [95 %-98 %] 97 %  O2 Device: None (Room air)    Current Weight: Weight - Scale: 120 kg (264 lb 12.8 oz)  First Weight: Weight - Scale: 120 kg (264 lb 12.8 oz)  I/O         11/09 0701  11/10 0700 11/10 0701 11/11 0700 11/11 0701  11/12 0700    P.O. 1640 1730 970    Total Intake(mL/kg) 1640 (13.7) 1730 (14.4) 970 (8.1)    Urine (mL/kg/hr) 500 (0.2) 600 (0.2) 600 (0.5)    Total Output 500 600 600    Net +1140 +1130 +370           Unmeasured Urine Occurrence 1 x            Physical Exam  General: NAD  Neuro: alert awake  Psych: mood and affect appropriate  Skin: no rash  Eyes: anicteric  ENMT: mm moist  Neck: no masses  Respiratory: ctab  Cardiovascular: rrr  Extremities: + bilateral leg edema  Gastrointestinal: soft nt nd    Medications:    Current Facility-Administered Medications:     acetaminophen (TYLENOL) tablet 650 mg, 650 mg, Oral, Q6H PRN, Alex George DO, 650 mg at 11/07/24 0737    aspirin (ECOTRIN LOW STRENGTH) EC tablet 81 mg, 81 mg, Oral, Daily, Alex George DO, 81 mg at 11/11/24 0830    atorvastatin (LIPITOR) tablet 40 mg, 40 mg, Oral, QPM, Alex George DO, 40 mg at 11/10/24 1712    buPROPion (WELLBUTRIN) tablet 100 mg, 100 mg, Oral, BID, Alex George DO, 100 mg at 11/11/24 0830    calcium carbonate (TUMS) chewable tablet 1,000 mg, 1,000 mg,  Oral, BID PRN, Alex George DO    docusate sodium (COLACE) capsule 100 mg, 100 mg, Oral, BID, Cathy Miranda MD, 100 mg at 11/11/24 0830    fluticasone (FLONASE) 50 mcg/act nasal spray 1 spray, 1 spray, Each Nare, Daily, SAMANTHA Kern, 1 spray at 11/11/24 0830    heparin (porcine) subcutaneous injection 7,500 Units, 7,500 Units, Subcutaneous, Q8H MIGEL, Alex George DO, 7,500 Units at 11/11/24 1528    hydrALAZINE (APRESOLINE) tablet 10 mg, 10 mg, Oral, Q8H MIGEL, SAMANTHA Kern, 10 mg at 11/11/24 1528    labetalol (NORMODYNE) tablet 300 mg, 300 mg, Oral, BID, Alex George DO, 300 mg at 11/11/24 0830    lactulose (CHRONULAC) oral solution 20 g, 20 g, Oral, TID PRN, Cathy Miranda MD, 20 g at 11/10/24 1712    loratadine (CLARITIN) tablet 5 mg, 5 mg, Oral, Daily, SAMANTHA Kern, 5 mg at 11/11/24 0830    melatonin tablet 3 mg, 3 mg, Oral, HS, SAMANTHA Kern, 3 mg at 11/10/24 2108    NIFEdipine (PROCARDIA XL) 24 hr tablet 60 mg, 60 mg, Oral, Daily, SAMANTHA Kern, 60 mg at 11/11/24 0830    OLANZapine (ZyPREXA) tablet 10 mg, 10 mg, Oral, HS, Alex George DO, 10 mg at 11/10/24 2106    ondansetron (ZOFRAN-ODT) dispersible tablet 4 mg, 4 mg, Oral, Q6H PRN, SAMANTHA Kern    pantoprazole (PROTONIX) EC tablet 40 mg, 40 mg, Oral, Early Morning, Alex George DO, 40 mg at 11/11/24 0550    polyethylene glycol (MIRALAX) packet 17 g, 17 g, Oral, Daily, Cathy Miranda MD, 17 g at 11/11/24 0831    senna (SENOKOT) tablet 17.2 mg, 2 tablet, Oral, HS, Cathy Miranda MD, 17.2 mg at 11/10/24 2109    traZODone (DESYREL) tablet 50 mg, 50 mg, Oral, HS PRN, Alex George DO, 50 mg at 11/08/24 2106    Valbenazine Tosylate CAPS 40 mg, 40 mg, Oral, HS, Alex George DO, 40 mg at 11/10/24 2200      Lab Results: I have reviewed the following results:  Results from last 7 days   Lab Units 11/11/24  0548 11/08/24  0546 11/06/24  0447   WBC Thousand/uL 6.15  --  5.16    HEMOGLOBIN g/dL 8.7*  --  9.3*   HEMATOCRIT % 25.9*  --  27.1*   PLATELETS Thousands/uL 159  --  170   POTASSIUM mmol/L 4.7 4.7 4.5   CHLORIDE mmol/L 107 108 108   CO2 mmol/L 23 23 22   BUN mg/dL 43* 36* 31*   CREATININE mg/dL 3.91* 3.68* 3.65*   CALCIUM mg/dL 8.6 8.8 8.5   PHOSPHORUS mg/dL  --  4.5 4.9*

## 2024-11-11 NOTE — ASSESSMENT & PLAN NOTE
Presented to the hospital significantly elevated blood pressure with systolic ranging from 197-231  Was placed on a Cardizem drip initially for hypertensive emergency  Had been on Demadex 20 mg daily but was on hold due to the increasing creatinine  Cw labetalol 300 mg BID , nifedipine 90 mg daily dec to 60 mg daily and hydralazine 25mg Q8h- hydral dec to 10 mg Q8h,   Goals for normotension  Mgmt per IM  Follow-up with nephrology as an outpatient

## 2024-11-11 NOTE — ASSESSMENT & PLAN NOTE
Initially with hypertensive emergency  Currently on labetalol 300 mg p.o. twice daily, nifedipine 60 mg p.o. daily, hydralazine 10 mg p.o. every 8hr  Recommend loop diuretic therapy as needed.  BP overall acceptable.

## 2024-11-12 DIAGNOSIS — G24.01 TARDIVE DYSKINESIA: ICD-10-CM

## 2024-11-12 LAB
ANION GAP SERPL CALCULATED.3IONS-SCNC: 8 MMOL/L (ref 4–13)
BUN SERPL-MCNC: 38 MG/DL (ref 5–25)
CALCIUM SERPL-MCNC: 8.8 MG/DL (ref 8.4–10.2)
CHLORIDE SERPL-SCNC: 107 MMOL/L (ref 96–108)
CO2 SERPL-SCNC: 23 MMOL/L (ref 21–32)
CREAT SERPL-MCNC: 3.78 MG/DL (ref 0.6–1.3)
DME PARACHUTE DELIVERY DATE REQUESTED: NORMAL
DME PARACHUTE ITEM DESCRIPTION: NORMAL
DME PARACHUTE ORDER STATUS: NORMAL
DME PARACHUTE SUPPLIER NAME: NORMAL
DME PARACHUTE SUPPLIER PHONE: NORMAL
GFR SERPL CREATININE-BSD FRML MDRD: 16 ML/MIN/1.73SQ M
GLUCOSE P FAST SERPL-MCNC: 83 MG/DL (ref 65–99)
GLUCOSE SERPL-MCNC: 108 MG/DL (ref 65–140)
GLUCOSE SERPL-MCNC: 83 MG/DL (ref 65–140)
GLUCOSE SERPL-MCNC: 88 MG/DL (ref 65–140)
POTASSIUM SERPL-SCNC: 4.5 MMOL/L (ref 3.5–5.3)
SODIUM SERPL-SCNC: 138 MMOL/L (ref 135–147)

## 2024-11-12 PROCEDURE — 97129 THER IVNTJ 1ST 15 MIN: CPT

## 2024-11-12 PROCEDURE — 97112 NEUROMUSCULAR REEDUCATION: CPT

## 2024-11-12 PROCEDURE — 97116 GAIT TRAINING THERAPY: CPT

## 2024-11-12 PROCEDURE — 99232 SBSQ HOSP IP/OBS MODERATE 35: CPT | Performed by: FAMILY MEDICINE

## 2024-11-12 PROCEDURE — 97130 THER IVNTJ EA ADDL 15 MIN: CPT

## 2024-11-12 PROCEDURE — 99232 SBSQ HOSP IP/OBS MODERATE 35: CPT | Performed by: INTERNAL MEDICINE

## 2024-11-12 PROCEDURE — 99233 SBSQ HOSP IP/OBS HIGH 50: CPT | Performed by: INTERNAL MEDICINE

## 2024-11-12 PROCEDURE — 80048 BASIC METABOLIC PNL TOTAL CA: CPT | Performed by: PHYSICIAN ASSISTANT

## 2024-11-12 PROCEDURE — 97110 THERAPEUTIC EXERCISES: CPT

## 2024-11-12 PROCEDURE — 94660 CPAP INITIATION&MGMT: CPT

## 2024-11-12 PROCEDURE — 92507 TX SP LANG VOICE COMM INDIV: CPT

## 2024-11-12 PROCEDURE — 82948 REAGENT STRIP/BLOOD GLUCOSE: CPT

## 2024-11-12 RX ORDER — VALBENAZINE 40 MG/1
40 CAPSULE ORAL
Qty: 90 CAPSULE | Refills: 0 | Status: SHIPPED | OUTPATIENT
Start: 2024-11-12

## 2024-11-12 RX ADMIN — HYDRALAZINE HYDROCHLORIDE 10 MG: 10 TABLET ORAL at 05:57

## 2024-11-12 RX ADMIN — TRAZODONE HYDROCHLORIDE 50 MG: 50 TABLET ORAL at 21:36

## 2024-11-12 RX ADMIN — BUPROPION HYDROCHLORIDE 100 MG: 100 TABLET, FILM COATED ORAL at 18:01

## 2024-11-12 RX ADMIN — HYDRALAZINE HYDROCHLORIDE 10 MG: 10 TABLET ORAL at 13:31

## 2024-11-12 RX ADMIN — ASPIRIN 81 MG: 81 TABLET, COATED ORAL at 08:13

## 2024-11-12 RX ADMIN — MELATONIN TAB 3 MG 3 MG: 3 TAB at 21:36

## 2024-11-12 RX ADMIN — BUPROPION HYDROCHLORIDE 100 MG: 100 TABLET, FILM COATED ORAL at 08:13

## 2024-11-12 RX ADMIN — PANTOPRAZOLE SODIUM 40 MG: 40 TABLET, DELAYED RELEASE ORAL at 05:58

## 2024-11-12 RX ADMIN — DOCUSATE SODIUM 100 MG: 100 CAPSULE, LIQUID FILLED ORAL at 08:14

## 2024-11-12 RX ADMIN — NIFEDIPINE 60 MG: 30 TABLET, FILM COATED, EXTENDED RELEASE ORAL at 08:14

## 2024-11-12 RX ADMIN — POLYETHYLENE GLYCOL 3350 17 G: 17 POWDER, FOR SOLUTION ORAL at 08:13

## 2024-11-12 RX ADMIN — LABETALOL HYDROCHLORIDE 300 MG: 100 TABLET, FILM COATED ORAL at 08:13

## 2024-11-12 RX ADMIN — FLUTICASONE PROPIONATE 1 SPRAY: 50 SPRAY, METERED NASAL at 08:17

## 2024-11-12 RX ADMIN — LABETALOL HYDROCHLORIDE 300 MG: 100 TABLET, FILM COATED ORAL at 21:36

## 2024-11-12 RX ADMIN — SENNOSIDES 17.2 MG: 8.6 TABLET, FILM COATED ORAL at 21:36

## 2024-11-12 RX ADMIN — LORATADINE 5 MG: 10 TABLET ORAL at 08:13

## 2024-11-12 RX ADMIN — VALBENAZINE 40 MG: 40 CAPSULE ORAL at 21:38

## 2024-11-12 RX ADMIN — HEPARIN SODIUM 7500 UNITS: 5000 INJECTION INTRAVENOUS; SUBCUTANEOUS at 21:36

## 2024-11-12 RX ADMIN — HEPARIN SODIUM 7500 UNITS: 5000 INJECTION INTRAVENOUS; SUBCUTANEOUS at 05:57

## 2024-11-12 RX ADMIN — HYDRALAZINE HYDROCHLORIDE 10 MG: 10 TABLET ORAL at 21:37

## 2024-11-12 RX ADMIN — ATORVASTATIN CALCIUM 40 MG: 40 TABLET, FILM COATED ORAL at 18:01

## 2024-11-12 RX ADMIN — HEPARIN SODIUM 7500 UNITS: 5000 INJECTION INTRAVENOUS; SUBCUTANEOUS at 13:31

## 2024-11-12 RX ADMIN — OLANZAPINE 10 MG: 2.5 TABLET, FILM COATED ORAL at 21:36

## 2024-11-12 RX ADMIN — DOCUSATE SODIUM 100 MG: 100 CAPSULE, LIQUID FILLED ORAL at 18:01

## 2024-11-12 NOTE — ASSESSMENT & PLAN NOTE
Lab Results   Component Value Date    EGFR 16 11/12/2024    EGFR 15 11/11/2024    EGFR 17 11/08/2024    CREATININE 3.78 (H) 11/12/2024    CREATININE 3.91 (H) 11/11/2024    CREATININE 3.68 (H) 11/08/2024     Creatinine currently 3.78 from 3.91.  Baseline creatinine 2.5-2.9.    Avoid nephrotoxins.  Home diuretics currently on hold.  Encourage hydration.  Renal ultrasound demonstrates no hydro but mild bladder wall thickening.   Nephrology following.  Labs MWF per Nephro.  Follows with Dr. No (Nephrology) as outpatient.

## 2024-11-12 NOTE — ASSESSMENT & PLAN NOTE
Lab Results   Component Value Date    HGBA1C 8.6 (H) 10/16/2024   Blood Sugar Average: Last 72 hrs:  (P) 103.125  Continue on insulin  most recent A1c 8.6% as of October 2024  Advised of tight glycemic control

## 2024-11-12 NOTE — PLAN OF CARE
Problem: SAFETY ADULT  Goal: Patient will remain free of falls  Description: INTERVENTIONS:  - Educate patient/family on patient safety including physical limitations  - Instruct patient to call for assistance with activity   - Consult OT/PT to assist with strengthening/mobility   - Keep Call bell within reach  - Keep bed low and locked with side rails adjusted as appropriate  - Keep care items and personal belongings within reach  - Initiate and maintain comfort rounds  - Make Fall Risk Sign visible to staff  - Offer Toileting every  Hours, in advance of need  - Initiate/Maintain alarm  - Obtain necessary fall risk management equipment:   - Apply yellow socks and bracelet for high fall risk patients  - Consider moving patient to room near nurses station  Outcome: Progressing     Problem: DISCHARGE PLANNING  Goal: Discharge to home or other facility with appropriate resources  Description: INTERVENTIONS:  - Identify barriers to discharge w/patient and caregiver  - Arrange for needed discharge resources and transportation as appropriate  - Identify discharge learning needs (meds, wound care, etc.)  - Arrange for interpretive services to assist at discharge as needed  - Refer to Case Management Department for coordinating discharge planning if the patient needs post-hospital services based on physician/advanced practitioner order or complex needs related to functional status, cognitive ability, or social support system  Outcome: Progressing

## 2024-11-12 NOTE — PROGRESS NOTES
"   11/12/24 7070   Pain Assessment   Pain Assessment Tool 0-10   Pain Score No Pain   Subjective   Subjective pt agreeable to participate in PT tx.   Sit to Stand   Type of Assistance Needed Verbal cues;Incidental touching   Comment intermittent cues for improved eccentric control when returning to sit   Sit to Stand CARE Score 4   Walk 10 Feet   Type of Assistance Needed Verbal cues;Incidental touching   Walk 10 Feet CARE Score 4   Walk 50 Feet with Two Turns   Type of Assistance Needed Verbal cues;Incidental touching   Comment CGA without AD   Walk 50 Feet with Two Turns CARE Score 4   Ambulation   Primary Mode of Locomotion Prior to Admission Walk   Distance Walked (feet) 75 ft  (purposely limited distances to household to prevent excess fatigue)   Gait Pattern Inconsistant Lesley;Decreased foot clearance;Improper weight shift;R foot drag;Narrow JENNA   Limitations Noted In Balance;Coordination;Heel Strike;Endurance;Speed;Strength;Swing;Safety   Provided Assistance with: Balance;Weight Shift   Walk Assist Level Contact Guard;Minimum Assist   Findings increased R foot drag noted as he fatigues   Does the patient walk? 2. Yes   4 Steps   Type of Assistance Needed Incidental touching;Verbal cues;Adaptive equipment   Comment CGA to negotiate 6\"  steps x 2 consecutive trials with single rail, reciprocal to ascend and step to to descend with cues for sequencing   4 Steps CARE Score 4   Therapeutic Interventions   Strengthening seated LAQ with 2.5# weights, 3 sets of 10 with 3\" hold   Neuromuscular Re-Education performed all activities with 2.5 # ankle weights BLE for increased intensity for NPP: ambulatory obstacle course with cone weaving and stepping over quad canes, alt LE taps to 6\" step with one UE support; sidestepping with RUE on parallel bar, sit < > stands holding 1# medball with BUEs, staggered stance with trunk rotations with 1# MB. one signficiant posterior LOB during last activity with absent balance " reactions and max A to return to chair to prevent fall   Equipment Use   NuStep 10 minutes Level 3-5 with R hand assist, SPM > 50   Assessment   Treatment Assessment Pt participated in 90 minute PT t xsession focusing on BLE strengthening exercises, balance and endurance activities, and functional mobility training. Pt fatigues easily and requires frequent rest breaks for energy recovery between activities. He requires CGA for short distance gait, with gait deficits worsening as he fatigues; hence recommending household distances at this time for reduced risk for falls. He demoed one signficiant posterior LOB during staggered stance, with absent balance reactions noted and requiring max A to return to chair.  He will continue to benefit from skilled PT interventions to address deficits, reduce risk for falls, and maximize functional independence   Problem List Decreased strength;Decreased endurance;Impaired balance;Decreased mobility;Decreased coordination;Impaired judgement;Decreased safety awareness;Obesity   Barriers to Discharge Inaccessible home environment   Plan   Treatment/Interventions Functional transfer training;LE strengthening/ROM;Elevations;Therapeutic exercise;Endurance training;Bed mobility;Gait training;Patient/family training   Progress Progressing toward goals   PT Therapy Minutes   PT Time In 0930   PT Time Out 1100   PT Total Time (minutes) 90   PT Mode of treatment - Individual (minutes) 90   PT Mode of treatment - Concurrent (minutes) 0   PT Mode of treatment - Group (minutes) 0   PT Mode of treatment - Co-treat (minutes) 0   PT Mode of Treatment - Total time(minutes) 90 minutes   PT Cumulative Minutes 1435   Therapy Time missed   Time missed? No

## 2024-11-12 NOTE — ASSESSMENT & PLAN NOTE
ROSINA thought to be secondary to IAN 10/14/2024 + ischemic injury secondary to severe hemodynamic perturbations plus some component of prerenal azotemia with subsequent ATN.  Creatinine appears to have plateaued at approximately 3.5-3.7.  Current EGFR of 17.  Likely represents new baseline given previous advanced CKD with previous baseline creatinine near 3.0.  Creatinine slightly improved and close to baseline    Noted nephrotic range proteinuria underlying disease most likely secondary to diabetic kidney disease.

## 2024-11-12 NOTE — ASSESSMENT & PLAN NOTE
Continue Ingrezza 40 mg at bedtime  Per wife he gets the medicine from [place], requested that she obtain more as he is running out

## 2024-11-12 NOTE — ASSESSMENT & PLAN NOTE
Lab Results   Component Value Date    HGBA1C 8.6 (H) 10/16/2024       Recent Labs     11/10/24  2103 11/11/24  0633 11/11/24  1618 11/12/24  0606   POCGLU 111 93 94 88     Most recent hemoglobin A1c of 8.6 and technically uncontrolled although blood sugars in the hospital have been well-controlled  Currently on semaglutide as an outpatient but was on hold due to ROSINA in the hospital  Continue sliding scale and 4 times daily Accu-Cheks and as well as a diabetic diet

## 2024-11-12 NOTE — ASSESSMENT & PLAN NOTE
Lab Results   Component Value Date    HGBA1C 8.6 (H) 10/16/2024       Recent Labs     11/10/24  2103 11/11/24  0633 11/11/24  1618 11/12/24  0606   POCGLU 111 93 94 88       Blood Sugar Average: Last 72 hrs:  (P) 103.125    Had not been taking anything at home.  Ran out of Ozempic a few months ago.  BG has been well controlled.  Continue cons. carb diet and BID accuchecks.

## 2024-11-12 NOTE — PROGRESS NOTES
Progress Note - Internal Medicine   Name: Tommie Taylor 59 y.o. male I MRN: [id number]  Unit/Bed#: -01 I Date of Admission: 10/26/2024   Date of Service: 11/12/2024 I Hospital Day: 17    Assessment & Plan  CVA (cerebral vascular accident) (HCC)  Initially presented with R sided weakness and R facial droop.   CTH and CTA negative for acute process.   MRI showed left corona radiata stroke.   Received TNK.    Completed 3 weeks of DAPT on 11/6.  Continue with ASA 81mg daily.  Continue statin.    Neurovascular checks Q shift.  Maintain normotension.    Follow up with Neurovascular in 4-6 weeks as outpatient.  Recommending Zio patch/Loop.     PT/OT/ST per primary team.     Complaints of worsening RLE weakness on 11/4 after therapies - CTH stable.  Noted to have orthostasis - recommend obtaining higher BP to prevent symptoms.  Bipolar I disorder, severe, current or most recent episode depressed, with psychotic features ([facility])  Currently on bupropion 100mg BID and olanzapine 10mg at HS with valbenazine tosylate 40mg for tardive dyskinesia per home regimen. Follow up outpatient with psychiatry.   GERD (gastroesophageal reflux disease)  EGD with GI 10/14, directed to continue with Protonix. Tums available PRN for dyspepsia.   Insomnia  Educate and encourage on sleep hygiene. Continue Trazodone at HS for sleep.   DAISY (obstructive sleep apnea)  Continue use of CPAP with home settings.  Does not use CPAP at home - currently broken.  Recommend overnight oximetry prior to discharge.   Follow-up with Sleep Medicine as outpatient.  Anemia, unspecified  Hgb currently 8.7.  Hemoglobin 12.1 on admission.   Most recent iron panel on 10/15: Iron Sat 38%, TIBC 204, Iron 77, and Ferritin 111.  No need for iron supplementation at this time.  FOBT 2 out of 3 negative.  3rd to be obtained.  Completed 3 doses of IV Venofer per Nephrology recs.  Continue to trend routine CBC.  Hyperlipidemia  Most recent lipid panel 10/16/24:  Cholesterol 154, triglycerides 120, HDL 40, LDL 90.  Continue Lipitor 40mg daily, increased from home 20mg daily.  Class 3 severe obesity due to excess calories with serious comorbidity and body mass index (BMI) of 40.0 to 44.9 in adult ([facility])  BMI 42.74 on admission. Encourage lifestyle modifications and provide support for nutritional teaching. Consult placed to nutrition services during acute course.   Tardive dyskinesia  Continue on home treatment Valbenazine Tosylate 40mg HS.   Hypertension  Presented with hypertensive emergency on initial hospitalization.   Concerns about medication non-compliance.  Home regimen: labetalol 100mg BID and amlodipine 10mg daily.  Currently on labetalol 300mg BID, Nifedipine 60mg daily, and hydralazine 10mg Q8 hours.  Nephrology following.   DM2 (diabetes mellitus, type 2) ([facility])  Lab Results   Component Value Date    HGBA1C 8.6 (H) 10/16/2024       Recent Labs     11/10/24  2103 11/11/24  0633 11/11/24  1618 11/12/24  0606   POCGLU 111 93 94 88       Blood Sugar Average: Last 72 hrs:  (P) 103.125    Had not been taking anything at home.  Ran out of Ozempic a few months ago.  BG has been well controlled.  Continue cons. carb diet and BID accuchecks.    Acute kidney injury superimposed on stage 4 chronic kidney disease ([facility])  Lab Results   Component Value Date    EGFR 16 11/12/2024    EGFR 15 11/11/2024    EGFR 17 11/08/2024    CREATININE 3.78 (H) 11/12/2024    CREATININE 3.91 (H) 11/11/2024    CREATININE 3.68 (H) 11/08/2024     Creatinine currently 3.78 from 3.91.  Baseline creatinine 2.5-2.9.    Avoid nephrotoxins.  Home diuretics currently on hold.  Encourage hydration.  Renal ultrasound demonstrates no hydro but mild bladder wall thickening.   Nephrology following.  Labs MWF per Nephro.  Follows with Dr. No (Nephrology) as outpatient.  Encephalopathy  Lethargy and encephalopathic symptoms noted during acute course.  B12 and head CT WNL. No subsequent episodes noted.   EEG on  10/22 consistent with moderate nonspecific cerebral dysfunction, no seizure activity.  Continue to monitor behavior and symptoms for signs of changes.   Most recent MoCA was 18/30 in 2024.  Per Psych - if continues to have delirium consider discontinuing Wellbutrin.  Concerns DAISY could also be contributing to encephalopathy per acute care.  Vitamin D deficiency  Vitamin D level <7 on 10/7.  Continue home vitamin D 50,000u weekly.  Continue to follow-up with Nephrology as outpatient.  Orthostatic hypotension  Orthostatic + on .  Continue to monitor orthostatics.  Apply abdominal binder/TEDs as needed.  Encourage PO hydration.    VTE Pharmacologic Prophylaxis:   Pharmacologic: Heparin  Mechanical VTE Prophylaxis in Place: Yes - sequential compression devices.    Current Length of Stay: 17 day(s)    Current Patient Status: Inpatient Rehab     Discharge Plan: As per primary team.    Code Status: Level 1 - Full Code    Subjective:   Pt examined while pt sitting in WC in pt room.  Just came back from PT.  States that it was a lot of work.  Denies any lightheadedness, dizziness, SOB, palpitations, or CP.  Did experience some imbalance towards the end of his PT session but recovered quickly.  Had a BM last night.  Denies any further diarrhea.    Objective:     Vitals:   Temp (24hrs), Av.2 °F (36.2 °C), Min:96.8 °F (36 °C), Max:97.8 °F (36.6 °C)    Temp:  [96.8 °F (36 °C)-97.8 °F (36.6 °C)] 97.8 °F (36.6 °C)  HR:  [69-82] 74  Resp:  [20] 20  BP: (123-170)/(74-86) 141/74  SpO2:  [97 %-98 %] 98 %  Body mass index is 42.74 kg/m².     Review of Systems   Constitutional:  Negative for appetite change, chills, fatigue and fever.   HENT:  Negative for trouble swallowing.    Eyes:  Negative for visual disturbance.   Respiratory:  Negative for cough, shortness of breath, wheezing and stridor.    Cardiovascular:  Negative for chest pain, palpitations and leg swelling.   Gastrointestinal:  Negative for abdominal distention,  abdominal pain, constipation, diarrhea, nausea and vomiting.        LBM 11/11   Genitourinary:  Negative for difficulty urinating.   Musculoskeletal:  Negative for arthralgias, back pain and gait problem.   Neurological:  Positive for weakness (R sided weakness since stroke). Negative for dizziness, light-headedness, numbness and headaches.   Psychiatric/Behavioral:  Negative for dysphoric mood and sleep disturbance. The patient is not nervous/anxious.    All other systems reviewed and are negative.       Input and Output Summary (last 24 hours):       Intake/Output Summary (Last 24 hours) at 11/12/2024 0907  Last data filed at 11/11/2024 1900  Gross per 24 hour   Intake 800 ml   Output 630 ml   Net 170 ml       Physical Exam:     Physical Exam  Vitals and nursing note reviewed.   Constitutional:       General: He is not in acute distress.     Appearance: Normal appearance. He is obese. He is not ill-appearing.   HENT:      Head: Normocephalic and atraumatic.   Cardiovascular:      Rate and Rhythm: Normal rate and regular rhythm.      Pulses: Normal pulses.      Heart sounds: Normal heart sounds. No murmur heard.     No friction rub.   Pulmonary:      Effort: Pulmonary effort is normal. No respiratory distress.      Breath sounds: Normal breath sounds. No wheezing or rhonchi.   Abdominal:      General: Abdomen is flat. Bowel sounds are normal. There is no distension.      Palpations: Abdomen is soft. There is no mass.      Tenderness: There is no abdominal tenderness. There is no guarding or rebound.      Hernia: No hernia is present.   Musculoskeletal:      Cervical back: Normal range of motion and neck supple. No tenderness.      Right lower leg: Edema (+1 pitting edema) present.      Left lower leg: Edema (+1 pitting edema) present.   Skin:     General: Skin is warm and dry.      Capillary Refill: Capillary refill takes less than 2 seconds.   Neurological:      Mental Status: He is alert and oriented to person,  place, and time.      Cranial Nerves: Dysarthria and facial asymmetry present.   Psychiatric:         Mood and Affect: Mood normal.         Behavior: Behavior normal.         Additional Data:     Labs:    Results from last 7 days   Lab Units 11/11/24  0548   WBC Thousand/uL 6.15   HEMOGLOBIN g/dL 8.7*   HEMATOCRIT % 25.9*   PLATELETS Thousands/uL 159   SEGS PCT % 59   LYMPHO PCT % 24   MONO PCT % 12   EOS PCT % 3     Results from last 7 days   Lab Units 11/12/24  0555   SODIUM mmol/L 138   POTASSIUM mmol/L 4.5   CHLORIDE mmol/L 107   CO2 mmol/L 23   BUN mg/dL 38*   CREATININE mg/dL 3.78*   ANION GAP mmol/L 8   CALCIUM mg/dL 8.8   GLUCOSE RANDOM mg/dL 83         Results from last 7 days   Lab Units 11/12/24  0606 11/11/24  1618 11/11/24  0633 11/10/24  2103 11/10/24  1620 11/10/24  0609 11/09/24  1558 11/09/24  0556 11/08/24  1603 11/08/24  0618 11/07/24  2045 11/07/24  1602   POC GLUCOSE mg/dl 88 94 93 111 106 105 97 131 131 104 144* 89               Labs reviewed    Imaging:    Imaging reviewed    Recent Cultures (last 7 days):           Last 24 Hours Medication List:   Current Facility-Administered Medications   Medication Dose Route Frequency Provider Last Rate    acetaminophen  650 mg Oral Q6H PRN Alex S George, DO      aspirin  81 mg Oral Daily Alex S George, DO      atorvastatin  40 mg Oral QPM Alex S George, DO      buPROPion  100 mg Oral BID Alex S George, DO      calcium carbonate  1,000 mg Oral BID PRN Alex S George, DO      docusate sodium  100 mg Oral BID Cathy Miranda MD      fluticasone  1 spray Each Nare Daily SAMANTHA Kern      heparin (porcine)  7,500 Units Subcutaneous Q8H MIGEL Alex S George, DO      hydrALAZINE  10 mg Oral Q8H [facility] SAMANTHA Kern      labetalol  300 mg Oral BID Alex S George, DO      lactulose  20 g Oral TID PRN Cathy Miranda MD      loratadine  5 mg Oral Daily SAMANTHA Kern      melatonin  3 mg Oral HS ZHANG KernNP       NIFEdipine  60 mg Oral Daily SAMANTHA Kern      OLANZapine  10 mg Oral HS Alex George, DO      ondansetron  4 mg Oral Q6H PRN SAMANTHA Kern      pantoprazole  40 mg Oral Early Morning Alex George, DO      polyethylene glycol  17 g Oral Daily Cathy Miranda MD      senna  2 tablet Oral HS Cathy Miranda MD      traZODone  50 mg Oral HS PRN Alex George, DO      Valbenazine Tosylate  40 mg Oral HS Alex George, DO          M*Modal software was used to dictate this note.  It may contain errors with dictating incorrect words or incorrect spelling. Please contact the provider directly with any questions.

## 2024-11-12 NOTE — ASSESSMENT & PLAN NOTE
Stool for occult blood negative  SPEP negative for monoclonal gammopathy  Iron saturation 38% with a ferritin of 111.  Continue with Venofer.  KOBI relatively contraindicated given recent CVA.  Hgb remains below goal  Transfuse as needed per primary team

## 2024-11-12 NOTE — TELEPHONE ENCOUNTER
Reason for call:   [x] Refill   [] Prior Auth  [] Other:     Office:   [] PCP/Provider -   [x] Specialty/Provider - Psych    Medication: Ingrezza    Dose/Frequency: 40 mg    Quantity: 90 capsules    Pharmacy: [facility] ([facility]) #[id number] - [place] - [address] [place], [place] [address]       Does the patient have enough for 3 days?   [] Yes   [x] No - Send as HP to POD

## 2024-11-12 NOTE — ASSESSMENT & PLAN NOTE
Lab Results   Component Value Date    EGFR 16 11/12/2024    EGFR 15 11/11/2024    EGFR 17 11/08/2024    CREATININE 3.78 (H) 11/12/2024    CREATININE 3.91 (H) 11/11/2024    CREATININE 3.68 (H) 11/08/2024   Stage 4  Follows with Dr. No outpatient  Likely secondary to diabetic nephropathy and hypertensive nephrosclerosis

## 2024-11-12 NOTE — PROGRESS NOTES
"   11/12/24 0800   Pain Assessment   Pain Assessment Tool 0-10   Pain Score No Pain   Comprehension   Comprehension (FIM) 4 - Understands basic info/conversation 75-90% of time   Expression   Expression (FIM) 5 - Needs help/cues only RARELY (< 10% of the time)   Social Interaction   Social Interaction (FIM) 5 - Interacts appropriately with others 90% of time   Problem Solving   Problem solving (FIM) 3 - Solves basic problmes 50-74% of time   Memory   Memory (FIM) 3 - Recognizes, recalls/performs 50-74%   Speech/Language/Cognition Assessment   Treatment Assessment Patient sitting in wheelchair in room upon SLP session, alert and oriented, agreeable to going into therapy gym for session. Pt first requesting to put on his sweatshirt, directing SLP where in the room to find. SLP assisting pt putting on sweatshirt, to where the pt offering verbal instructions for putting on. He did realize when attempting to put in R arm (paresis) that he should have instructed SLP to put that arm in first, demonstrating some insight, though not proper sequencing when initiating putting on the sweatshirt. SLP discussed the sequence with pt for future opportunities. Once in the therapy gym, pt appropriately locking brakes in, demonstrating safety awareness and initiation. Pt first asked to recall clear speech strategies by way of review. Pt able to independently recall over articulate and breath support. He required min verbal cues (\"the way someone speaks\") to recall speaking LOUD and SLOW. Following this engaged in some brief OM exercises to prime oral musculature for exaggerating articulatory movements during completing sentence task. SLP read aloud a sentence with open ended space at the end of each sentence. Patient was to repeat the sentence and complete the sentence with choice of words. He demonstrated 100% intelligibility in context, but presented with reduced articulation and preciseness after several sentences, requiring need " "for verbal cue to improve and attend to his performance and lack of strategy use. When cued, able to correct and improve articulatory performance. Overall, required min-mod verbal cueing to complete with clear use of strategies and clear speech output. Throughout task, SLP would ask f/u questions regarding patient's sentence completion to assess pt's carryover of strategies in spontaneous speech. Patient demonstrating poor carryover, requiring mod-max verbal cues to use strategies. SLP reminding pt of his success with use during structured tasks, but in fxl settings at time of discharge is when it's most important and therefore SLP encouraging him to attend more often to using in those settings/situations. Next, engaged in reciting sequences such as alphabet, ANT, things to wear, etc. He did demonstrates consistent phonological process for /dg/ (J as in \"Jump\"), using deaffrication by substituting with the stop /d/. He shows improvements with modeling and repetition. SLP discussed the importance of the strategy SLOW to help with this phonological process. Next, pt requesting to use urinal so SLP assisting pt back to room and pt able to complete independently. While in the room, pt and SLP discussing discharge plans and pt stating he is aware he will have help with medications, but that he would like to take on as much responsibility as possible. Given this, SLP and pt discussed current med list in his folder to go home with, reviewing the purpose of each, and the frequency of each. He independently knew the purpose of 4/12 current medications, increasing to 5/12 with min verbal cue. He only knew the frequency of 2/12 medications. SLP discussed attending to each medication as RN administers, asking questions to clarify, to further increase awareness. Additionally, SLP encouraged reviewing written list provided by therapy team that was just reviewed. Finally, SLP discussed that some medication names given hints as to " what they treat, explaining each one that does. Lastly, SLP and pt discussed attention strategies as pt reported decreased attention abilities since stroke, although he had baseline attention deficits per his report. SLP reviewed limiting visual and auditory attention strategies, being organized, rest, and review. Pt expressing understanding verbally; suspect would benefit from additional review in subsequent sessions. Pt is recommended for and will continue to benefit form further skilled SLP services focusing on overall cognitive linguistic skills and speech intelligibility skills in order to optimize  independence and reduce burden of care at time of discharge.    SLP Therapy Minutes   SLP Time In 0800   SLP Time Out 0900   SLP Total Time (minutes) 60   SLP Mode of treatment - Individual (minutes) 60   SLP Mode of treatment - Concurrent (minutes) 0   SLP Mode of treatment - Group (minutes) 0   SLP Mode of treatment - Co-treat (minutes) 0   SLP Mode of Treatment - Total time(minutes) 60 minutes   SLP Cumulative Minutes 395   Therapy Time missed   Time missed? No

## 2024-11-12 NOTE — PLAN OF CARE
Problem: Potential for Falls  Goal: Patient will remain free of falls  Description: INTERVENTIONS:  - Educate patient/family on patient safety including physical limitations  - Instruct patient to call for assistance with activity   - Consult OT/PT to assist with strengthening/mobility   - Keep Call bell within reach  - Keep bed low and locked with side rails adjusted as appropriate  - Keep care items and personal belongings within reach  - Initiate and maintain comfort rounds  - Make Fall Risk Sign visible to staff  - Offer Toileting every 2-4 Hours, in advance of need  - Initiate/Maintain bed/chair alarms  - Apply yellow socks and bracelet for high fall risk patients  - Consider moving patient to room near nurses station  Outcome: Progressing

## 2024-11-12 NOTE — PROGRESS NOTES
Progress Note - PMR   Name: Tommie Taylor 59 y.o. male I MRN: [id number]  Unit/Bed#: [facility] 261-01 I Date of Admission: 10/26/2024   Date of Service: 11/12/2024 I Hospital Day: 17     Assessment & Plan  CVA (cerebral vascular accident) ([facility])  Patient with uncontrolled hypertension and diabetes presents with right upper and right lower extremity weakness as well as facial droop  A CT of the head as well as a CTA of the head and neck were completed with negative for acute abnormalities for an acute process  TNK had been administered following the correction of his hypertension after being placed on a Cardene drip  After ministration of the TNK developed worsening dysarthria as well as headache and a repeat CT was still negative.  The 24 post TNK CT was also negative  Neurology followed the patient and MRI was completed and was significant for left corona radiata stroke  Placed on dual antiplatelet therapy for 3 weeks with plans for monotherapy with aspirin and statin indefinitely for secondary stroke prophylaxis (10/15-11/5) started on ASA and statin monotherapy   Recommendation for a Zio patch as an outpatient  Follow-up with neurovascular attending in 6 to 8 weeks  Physical, occupational and speech therapy while on the acute rehabilitation unit  Episode of worsening RLE weakness,CTH 11/4 no acute changes.   Bipolar I disorder, severe, current or most recent episode depressed, with psychotic features ([facility])  History of chronic bipolar 1 disorder and follows with outpatient psychiatry and was seen inpatient  Mood has been stable and is maintained on Zyprexa, bupropion and trazodone as well as Ingrezza for tardive dyskinesia  Follow-up with psychiatry as an outpatient and consider virtual consultation if indicated for any changes while on ARC  GERD (gastroesophageal reflux disease)  Continue Protonix as well as as needed Tums  Insomnia  Continue trazodone and consider sleep logs  Encourage use of CPAP   DAISY (obstructive  sleep apnea)  Continue CPAP with home settings  Ordered and compliant per records  Anemia, unspecified  Hemoglobin most recently of 10.4 which is up from 9.3 but has been hovering in the 10-11 range for most of admission  FOBT: neg x3 10/29,11/1,11/4  Started on venofer per nephrology x3 completed   Continue to monitor with biweekly CBC or sooner if clinically indicated  Hyperlipidemia  Continue Lipitor 40 mg every evening  Class 3 severe obesity due to excess calories with serious comorbidity and body mass index (BMI) of 40.0 to 44.9 in adult ([facility])  Continue with exercise and promote lifestyle modification, weight loss counseling and management of medical conditions to optimize status  Tardive dyskinesia  Continue Ingrezza 40 mg at bedtime  Per wife he gets the medicine from [place], requested that she obtain more as he is running out  Hypertension  Presented to the hospital significantly elevated blood pressure with systolic ranging from 197-231  Was placed on a Cardizem drip initially for hypertensive emergency  Had been on Demadex 20 mg daily but was on hold due to the increasing creatinine  Cw labetalol 300 mg BID , nifedipine 90 mg daily dec to 60 mg daily and hydralazine 25mg Q8h- hydral dec to 10 mg Q8h,   Goals for normotension  Mgmt per IM  Follow-up with nephrology as an outpatient  DM2 (diabetes mellitus, type 2) ([facility])  Lab Results   Component Value Date    HGBA1C 8.6 (H) 10/16/2024       Recent Labs     11/10/24  2103 11/11/24  0633 11/11/24  1618 11/12/24  0606   POCGLU 111 93 94 88     Most recent hemoglobin A1c of 8.6 and technically uncontrolled although blood sugars in the hospital have been well-controlled  Currently on semaglutide as an outpatient but was on hold due to ROSINA in the hospital  Continue sliding scale and 4 times daily Accu-Cheks and as well as a diabetic diet    Acute kidney injury superimposed on stage 4 chronic kidney disease ([facility])  Lab Results   Component Value Date    EGFR  16 11/12/2024    EGFR 15 11/11/2024    EGFR 17 11/08/2024    CREATININE 3.78 (H) 11/12/2024    CREATININE 3.91 (H) 11/11/2024    CREATININE 3.68 (H) 11/08/2024     Recent creatinine of 3.78 11/12 Prior baseline around 2.9-3.0  Etiology felt to be related potentially to ATN in the setting of uncontrolled hypertension and complicated by contrast associated nephropathy  Nephrology was consulted and followed and a UA showed microhematuria and proteinuria.  An ultrasound of the kidney/bladder showed wall thickening but no hydronephrosis  Bladder scans negative for retention  Monitor BMP triweekly or sooner if clinically indicated  Demadex has been on hold and had periodically required IV fluids  Nephrology following; kidney function improved, now around his new baseline, continue to encourage PO   Follow with nephrology as an outpatient or sooner on the ARC if any acute changes  Encephalopathy  Had lethargy on exam back on 10/17 in acute care with improvements however more recently had issues with confusion and decreased consciousness in the mornings that improves throughout the day with unclear etiology  Prior history of cognitive impairments with last MoCA score of 18  CT of the head was stable, B12 level (446) wnl  Was evaluated by psychiatry with no changes in medications recommended  EEG showed no epileptiform activity just moderate nonspecific dysfunction  Will need to monitor especially with change in environment now on the ARC for any changes  Impaired mobility and activities of daily living  Patient was evaluated by the rehabilitation team MD and an appropriate candidate for acute inpatient rehabilitation program at this time.  The patient will tolerate 3 hours/day 5 to 7 days/week of intensive physical, occupational and speech therapy in order to obtain goals for community discharge  Due to the patient's functional Compared to their baseline level of function in addition to their ongoing medical needs, the  patient would benefit from daily supervision from a rehabilitation physician as well as rehabilitation nursing to implement and adjust the medical as well as functional plan of care in order to meet the patient's goals.  DC: 11/18 homecare PT/OT/SLP/RN/HHA  At risk for alteration in bowel function  Constipated no bm for several days; lactulose PRN  Start colase, senna daily and miralax prn  Titrate as needed   Overgrown toenails  Cs podiatry   Vitamin D deficiency  <7 10/7  Mgmt per IM/nephrology   Fall during current hospitalization  Patient with fall to knees at shift change 10/31   Denied any knee pain  Get larger bariatric chair   Fall precautions.   Orthostatic hypotension  Noted to have orthostatic hypotension during therapy   Apply TEDs/Binder if available for patient size.   Fall precautions  Monitor anemia as above   BP medication titration per IM     History of Present Illness   Tommie Taylor is a 59 y.o. male with history of bipolar disorder, CKD stage IV, type 2 diabetes, hypertension, tardive dyskinesia, sleep apnea, GERD who presented to the [facility] on 10/14/2024 for right-sided weakness and facial droop.  MRI revealed a left corona radiata infarct and neurology was consulted and was administered TNK.  He initially required a Cardene drip for his hypertensive emergency and was eventually placed on dual antiplatelet therapy with plan to continue aspirin and Plavix until that date and continue with aspirin as monotherapy as well as atorvastatin for secondary stroke prophylaxis.  He was recommended for Zio patch placement as an outpatient.  His hospital course was complicated by acute kidney injury on top of his CKD thought to be related to ATN in the setting of uncontrolled blood pressure as well as contrast associated nephropathy.  Nephrology did follow during his course and had some levels of improvement however has not returned back to his baseline level of creatinine.   Additionally there was some levels of encephalopathy which are stable at this time. The patient was evaluated by the Rehabilitation team and deemed an appropriate candidate for comprehensive inpatient rehabilitation and admitted to the ARC on 10/26/2024  5:13 PM     Rehab Diagnosis: Impairment of mobility, safety, Activities of Daily Living (ADLs), and cognitive/communication skills due to Stroke:  01.2  Right Body Involvement (Left Brain)  Etiologic Dx: Left Corona Radiata Infarct  Date of Onset: 10/14/2024   Date of surgery: N/A  Chief Complaint: f/u ambulatory dysfunction    Interval: Patient seen and examined in wheelchair. No events overnight.  Reports overall feeling well. Asking for a prescription for a scooter, discussed that PT will determine the most appropriate device at discharge. Called his Wife to ask her to bring in more ingrezza. She notes that they get it specially made from [place] and will need to contact his psychiatrist for another prescription. She will bring the medicine in when she obtains another prescription. Last BM 11/11. Sleeping well at night. Denies any f/c/n/v, CP, SOB, abdominal pain, constipation, or diarrhea.       Objective   Functional Update:  Physical Therapy Occupational Therapy Speech Therapy   Weight Bearing Status: Full Weight Bearing  Transfers: Minimal Assistance  Bed Mobility: Incidental Touching  Amulation Distance (ft): 150 feet  Ambulation: Moderate Assistance, Incidental Touching, Minimal Assistance (Pt progressing but fluctuates, also BP has been dropping)  Assistive Device for Ambulation:  (no device)  Wheelchair Mobility Distance: 50 ft  Wheelchair Mobility: Minimal Assistance  Number of Stairs: 12  Assistive Device for Stairs: Lehft Hand Rail  Stair Assistance: Minimal Assistance  Ramp: Minimal Assistance  Discharge Recommendations: Home with:  DC Home with:: Family Support, 24 Hour Supervision   Eating: Supervision (set-up)  Grooming: Supervision  Bathing:  Moderate Assistance  Bathing: Moderate Assistance  Upper Body Dressing: Minimal Assistance  Lower Body Dressing: Minimal Assistance (footwear mod A)  Toileting: Moderate Assistance (intermittent incontinence of urine, having BM infrequently whihc he reports is baseline)  Tub/Shower Transfer: Minimal Assistance  Toilet Transfer: Minimal Assistance  Cognition: Exceptions to WNL  Cognition: Decreased Memory, Decreased Executive Functions, Decreased Attention, Decreased Safety, Decreased Comprehension  Orientation: Person, Place, Time, Situation   Mode of Communication: Verbal  Speech/Language: Dysarthia  Cognition: Exceptions to WNL  Cognition: Decreased Memory, Decreased Executive Functions, Decreased Attention, Decreased Comprehension, Decreased Safety  Orientation: Person, Place, Time, Situation  Discharge Recommendations: Home with:  DC Home with:: 24 Hour Supervision, 24 Hour Assisteance, Family Support, Home Speech Therapy, Outpatient Speech Therapy         Temp:  [96.8 °F (36 °C)-97.8 °F (36.6 °C)] 97.8 °F (36.6 °C)  HR:  [69-82] 74  Resp:  [20] 20  BP: (123-170)/(74-86) 141/74  SpO2:  [97 %-98 %] 98 %    Physical Exam    Gen: No acute distress, obese  HEENT: Moist mucus membranes, Normocephalic/Atraumatic  Cardiovascular: Regular rate, rhythm,  Heme/Extr: mild bilateral LE edema   Pulmonary: Non-labored breathing. Lungs CTAB  : No amezquita  GI:  non-distended. BS+  MSK: ROM is WFL in all extremities.   Integumentary: Skin is warm, dry. .  Neuro: dysarthric, RUE weakness 2/5, and RLE weakness 4/5 Appropriate to questioning.  Psych: Normal mood and affect.       Scheduled Meds:  Current Facility-Administered Medications   Medication Dose Route Frequency Provider Last Rate    acetaminophen  650 mg Oral Q6H PRN Alex S Ariel, DO      aspirin  81 mg Oral Daily Alex S Ariel, DO      atorvastatin  40 mg Oral QPM Alex S Ariel, DO      buPROPion  100 mg Oral BID Alex S Ariel, DO      calcium carbonate  1,000 mg Oral  BID PRN Alex S George, DO      docusate sodium  100 mg Oral BID Cathy Miranda MD      fluticasone  1 spray Each Nare Daily Joe Garcia, SAMANTHA      heparin (porcine)  7,500 Units Subcutaneous Q8H MIGEL Alex S George, DO      hydrALAZINE  10 mg Oral Q8H MIGEL Joe Garcia, CRNP      labetalol  300 mg Oral BID Alex S Ariel, DO      lactulose  20 g Oral TID PRN Cathy Miranda MD      loratadine  5 mg Oral Daily Joe Garcia, CRNP      melatonin  3 mg Oral HS Joe Garcia, CRNP      NIFEdipine  60 mg Oral Daily Joe Garcia, CRNP      OLANZapine  10 mg Oral HS Alex S George, DO      ondansetron  4 mg Oral Q6H PRN Joe Garcia, CRNP      pantoprazole  40 mg Oral Early Morning Alex S George, DO      polyethylene glycol  17 g Oral Daily Cathy Miranda MD      senna  2 tablet Oral HS Cathy Miranda MD      traZODone  50 mg Oral HS PRN Alex S George, DO      Valbenazine Tosylate  40 mg Oral HS Alex S George, DO           Lab Results: I have reviewed the following results:  Results from last 7 days   Lab Units 11/11/24  0548 11/06/24  0447   HEMOGLOBIN g/dL 8.7* 9.3*   HEMATOCRIT % 25.9* 27.1*   WBC Thousand/uL 6.15 5.16   PLATELETS Thousands/uL 159 170     Results from last 7 days   Lab Units 11/12/24  0555 11/11/24  0548 11/08/24  0546   BUN mg/dL 38* 43* 36*   SODIUM mmol/L 138 137 138   POTASSIUM mmol/L 4.5 4.7 4.7   CHLORIDE mmol/L 107 107 108   CREATININE mg/dL 3.78* 3.91* 3.68*              Cathy Miranda MD   Physical Medicine and Rehabilitation   11/12/24    I have spent a total time of 38 minutes in caring for this patient on the day of the visit/encounter including Patient and family education, Documenting in the medical record, and Communicating with other healthcare professionals .

## 2024-11-12 NOTE — ASSESSMENT & PLAN NOTE
Presented with hypertensive emergency on initial hospitalization.   Concerns about medication non-compliance.  Home regimen: labetalol 100mg BID and amlodipine 10mg daily.  Currently on labetalol 300mg BID, Nifedipine 60mg daily, and hydralazine 10mg Q8 hours.  Nephrology following.

## 2024-11-12 NOTE — ASSESSMENT & PLAN NOTE
Initially with hypertensive emergency  Currently on labetalol 300 mg p.o. twice daily, nifedipine 60 mg p.o. daily, hydralazine 10 mg p.o. every 8hr  Recommend loop diuretic therapy as needed.  Currently appears close to euvolemic.   BP overall acceptable. Continue to monitor.

## 2024-11-12 NOTE — ASSESSMENT & PLAN NOTE
Lab Results   Component Value Date    EGFR 16 11/12/2024    EGFR 15 11/11/2024    EGFR 17 11/08/2024    CREATININE 3.78 (H) 11/12/2024    CREATININE 3.91 (H) 11/11/2024    CREATININE 3.68 (H) 11/08/2024     Recent creatinine of 3.78 11/12 Prior baseline around 2.9-3.0  Etiology felt to be related potentially to ATN in the setting of uncontrolled hypertension and complicated by contrast associated nephropathy  Nephrology was consulted and followed and a UA showed microhematuria and proteinuria.  An ultrasound of the kidney/bladder showed wall thickening but no hydronephrosis  Bladder scans negative for retention  Monitor BMP triweekly or sooner if clinically indicated  Demadex has been on hold and had periodically required IV fluids  Nephrology following; kidney function improved, now around his new baseline, continue to encourage PO   Follow with nephrology as an outpatient or sooner on the ARC if any acute changes

## 2024-11-12 NOTE — PROGRESS NOTES
Progress Note - Nephrology   Name: Tommie Taylor 59 y.o. male I MRN: [id number]  Unit/Bed#: -01 I Date of Admission: 10/26/2024   Date of Service: 11/12/2024 I Hospital Day: 17     Assessment & Plan  Acute kidney injury superimposed on stage 4 chronic kidney disease (HCC)  ROSINA thought to be secondary to IAN 10/14/2024 + ischemic injury secondary to severe hemodynamic perturbations plus some component of prerenal azotemia with subsequent ATN.  Creatinine appears to have plateaued at approximately 3.5-3.7.  Current EGFR of 17.  Likely represents new baseline given previous advanced CKD with previous baseline creatinine near 3.0.  Creatinine slightly improved and close to baseline    Noted nephrotic range proteinuria underlying disease most likely secondary to diabetic kidney disease.  Chronic kidney disease  Lab Results   Component Value Date    EGFR 16 11/12/2024    EGFR 15 11/11/2024    EGFR 17 11/08/2024    CREATININE 3.78 (H) 11/12/2024    CREATININE 3.91 (H) 11/11/2024    CREATININE 3.68 (H) 11/08/2024   Stage 4  Follows with Dr. No outpatient  Likely secondary to diabetic nephropathy and hypertensive nephrosclerosis  Hypertension  Initially with hypertensive emergency  Currently on labetalol 300 mg p.o. twice daily, nifedipine 60 mg p.o. daily, hydralazine 10 mg p.o. every 8hr  Recommend loop diuretic therapy as needed.  Currently appears close to euvolemic.   BP overall acceptable. Continue to monitor.  Orthostatic hypotension  Mild orthostasis noted.  No changes in his current regimen.  Chronic kidney disease-mineral and bone disorder (CKD-MBD)  Phosphorus stable   Anemia, unspecified  Stool for occult blood negative  SPEP negative for monoclonal gammopathy  Iron saturation 38% with a ferritin of 111.  Continue with Venofer.  KOBI relatively contraindicated given recent CVA.  Hgb remains below goal  Transfuse as needed per primary team  CVA (cerebral vascular accident) (HCC)  Status post TNK  administration  Management as per primary service and neurology.  Bipolar I disorder, severe, current or most recent episode depressed, with psychotic features ([facility])  Follow-up with psych  Currently on Zyprexa and Wellbutrin  DM2 (diabetes mellitus, type 2) ([facility])  Lab Results   Component Value Date    HGBA1C 8.6 (H) 10/16/2024   Blood Sugar Average: Last 72 hrs:  (P) 103.125  Continue on insulin  most recent A1c 8.6% as of October 2024  Advised of tight glycemic control  Impaired mobility and activities of daily living  Currently with wheelchair  Follow-up with [facility] rehab     Nephrology service will follow.    Subjective   Patient states his leg swelling is better than usual.  He denies sob.  He denies changes in appetite.  He has no acute complaints.     Objective :  Temp:  [96.8 °F (36 °C)-97.8 °F (36.6 °C)] 97.8 °F (36.6 °C)  HR:  [69-82] 82  BP: (123-170)/(75-86) 168/83  Resp:  [20] 20  SpO2:  [97 %-98 %] 98 %  O2 Device: None (Room air)    Current Weight: Weight - Scale: 120 kg (264 lb 12.8 oz)  First Weight: Weight - Scale: 120 kg (264 lb 12.8 oz)  I/O         11/10 0701  11/11 0700 11/11 0701 11/12 0700 11/12 0701  11/13 0700    P.O. 1730 1220     Total Intake(mL/kg) 1730 (14.4) 1220 (10.2)     Urine (mL/kg/hr) 600 (0.2) 750 (0.3)     Total Output 600 750     Net +1130 +470            Unmeasured Urine Occurrence  3 x     Unmeasured Stool Occurrence  1 x           Physical Exam  General: NAD  Neuro: alert awake  Psych: mood and affect appropriate  Skin: no rash  Eyes: anicteric  ENMT: mm moist  Neck: no masses  Respiratory: CTAB  Cardiovascular: RRR  Extremities: + bilateral LE edema  Gastrointestinal: soft nt nd    Medications:    Current Facility-Administered Medications:     acetaminophen (TYLENOL) tablet 650 mg, 650 mg, Oral, Q6H PRN, Alex George DO, 650 mg at 11/07/24 0737    aspirin (ECOTRIN LOW STRENGTH) EC tablet 81 mg, 81 mg, Oral, Daily, Alex George DO, 81 mg at 11/11/24 0830    atorvastatin  (LIPITOR) tablet 40 mg, 40 mg, Oral, QPM, Alex George DO, 40 mg at 11/11/24 1706    buPROPion (WELLBUTRIN) tablet 100 mg, 100 mg, Oral, BID, Alex George DO, 100 mg at 11/11/24 1706    calcium carbonate (TUMS) chewable tablet 1,000 mg, 1,000 mg, Oral, BID PRN, Alex George DO    docusate sodium (COLACE) capsule 100 mg, 100 mg, Oral, BID, Cathy Miranda MD, 100 mg at 11/11/24 1706    fluticasone (FLONASE) 50 mcg/act nasal spray 1 spray, 1 spray, Each Nare, Daily, SAMANTHA Kern, 1 spray at 11/11/24 0830    heparin (porcine) subcutaneous injection 7,500 Units, 7,500 Units, Subcutaneous, Q8H MIGEL, Alex George DO, 7,500 Units at 11/12/24 0557    hydrALAZINE (APRESOLINE) tablet 10 mg, 10 mg, Oral, Q8H MIGEL, SAMANTHA Kern, 10 mg at 11/12/24 0557    labetalol (NORMODYNE) tablet 300 mg, 300 mg, Oral, BID, Alex George DO, 300 mg at 11/11/24 2116    lactulose (CHRONULAC) oral solution 20 g, 20 g, Oral, TID PRN, Cathy Miranda MD, 20 g at 11/11/24 1707    loratadine (CLARITIN) tablet 5 mg, 5 mg, Oral, Daily, SAMANTHA Kern, 5 mg at 11/11/24 0830    melatonin tablet 3 mg, 3 mg, Oral, HS, SAMANTHA Kern, 3 mg at 11/11/24 2116    NIFEdipine (PROCARDIA XL) 24 hr tablet 60 mg, 60 mg, Oral, Daily, SAMANTHA Kern, 60 mg at 11/11/24 0830    OLANZapine (ZyPREXA) tablet 10 mg, 10 mg, Oral, HS, Alex George DO, 10 mg at 11/11/24 2116    ondansetron (ZOFRAN-ODT) dispersible tablet 4 mg, 4 mg, Oral, Q6H PRN, SAMANTHA Kern    pantoprazole (PROTONIX) EC tablet 40 mg, 40 mg, Oral, Early Morning, Alex George DO, 40 mg at 11/12/24 0558    polyethylene glycol (MIRALAX) packet 17 g, 17 g, Oral, Daily, Cathy Miranda MD, 17 g at 11/11/24 0831    senna (SENOKOT) tablet 17.2 mg, 2 tablet, Oral, HS, Cathy Miranda MD, 17.2 mg at 11/11/24 2116    traZODone (DESYREL) tablet 50 mg, 50 mg, Oral, HS PRN, Alex George DO, 50 mg at 11/11/24 2116    Valbenazine  Tosylate CAPS 40 mg, 40 mg, Oral, HS, Alex George, DO, 40 mg at 11/11/24 2952      Lab Results: I have reviewed the following results:  Results from last 7 days   Lab Units 11/12/24  0555 11/11/24  0548 11/08/24  0546 11/06/24  0447   WBC Thousand/uL  --  6.15  --  5.16   HEMOGLOBIN g/dL  --  8.7*  --  9.3*   HEMATOCRIT %  --  25.9*  --  27.1*   PLATELETS Thousands/uL  --  159  --  170   POTASSIUM mmol/L 4.5 4.7 4.7 4.5   CHLORIDE mmol/L 107 107 108 108   CO2 mmol/L 23 23 23 22   BUN mg/dL 38* 43* 36* 31*   CREATININE mg/dL 3.78* 3.91* 3.68* 3.65*   CALCIUM mg/dL 8.8 8.6 8.8 8.5   PHOSPHORUS mg/dL  --   --  4.5 4.9*

## 2024-11-13 ENCOUNTER — TELEPHONE (OUTPATIENT)
Age: 59
End: 2024-11-13

## 2024-11-13 PROBLEM — R25.2 SPASTICITY: Status: ACTIVE | Noted: 2024-11-13

## 2024-11-13 LAB
GLUCOSE SERPL-MCNC: 103 MG/DL (ref 65–140)
GLUCOSE SERPL-MCNC: 109 MG/DL (ref 65–140)

## 2024-11-13 PROCEDURE — 97110 THERAPEUTIC EXERCISES: CPT

## 2024-11-13 PROCEDURE — 97130 THER IVNTJ EA ADDL 15 MIN: CPT

## 2024-11-13 PROCEDURE — 97530 THERAPEUTIC ACTIVITIES: CPT

## 2024-11-13 PROCEDURE — 99232 SBSQ HOSP IP/OBS MODERATE 35: CPT | Performed by: FAMILY MEDICINE

## 2024-11-13 PROCEDURE — 97112 NEUROMUSCULAR REEDUCATION: CPT

## 2024-11-13 PROCEDURE — 97129 THER IVNTJ 1ST 15 MIN: CPT

## 2024-11-13 PROCEDURE — 99233 SBSQ HOSP IP/OBS HIGH 50: CPT | Performed by: INTERNAL MEDICINE

## 2024-11-13 PROCEDURE — 82948 REAGENT STRIP/BLOOD GLUCOSE: CPT

## 2024-11-13 RX ADMIN — ASPIRIN 81 MG: 81 TABLET, COATED ORAL at 08:20

## 2024-11-13 RX ADMIN — LORATADINE 5 MG: 10 TABLET ORAL at 08:20

## 2024-11-13 RX ADMIN — POLYETHYLENE GLYCOL 3350 17 G: 17 POWDER, FOR SOLUTION ORAL at 08:19

## 2024-11-13 RX ADMIN — ATORVASTATIN CALCIUM 40 MG: 40 TABLET, FILM COATED ORAL at 17:29

## 2024-11-13 RX ADMIN — LABETALOL HYDROCHLORIDE 300 MG: 100 TABLET, FILM COATED ORAL at 21:20

## 2024-11-13 RX ADMIN — SENNOSIDES 17.2 MG: 8.6 TABLET, FILM COATED ORAL at 21:20

## 2024-11-13 RX ADMIN — OLANZAPINE 10 MG: 2.5 TABLET, FILM COATED ORAL at 21:20

## 2024-11-13 RX ADMIN — NIFEDIPINE 60 MG: 30 TABLET, FILM COATED, EXTENDED RELEASE ORAL at 08:20

## 2024-11-13 RX ADMIN — MELATONIN TAB 3 MG 3 MG: 3 TAB at 21:20

## 2024-11-13 RX ADMIN — HEPARIN SODIUM 7500 UNITS: 5000 INJECTION INTRAVENOUS; SUBCUTANEOUS at 06:26

## 2024-11-13 RX ADMIN — FLUTICASONE PROPIONATE 1 SPRAY: 50 SPRAY, METERED NASAL at 08:19

## 2024-11-13 RX ADMIN — PANTOPRAZOLE SODIUM 40 MG: 40 TABLET, DELAYED RELEASE ORAL at 06:26

## 2024-11-13 RX ADMIN — HEPARIN SODIUM 7500 UNITS: 5000 INJECTION INTRAVENOUS; SUBCUTANEOUS at 21:20

## 2024-11-13 RX ADMIN — LABETALOL HYDROCHLORIDE 300 MG: 100 TABLET, FILM COATED ORAL at 08:19

## 2024-11-13 RX ADMIN — HYDRALAZINE HYDROCHLORIDE 10 MG: 10 TABLET ORAL at 21:20

## 2024-11-13 RX ADMIN — TRAZODONE HYDROCHLORIDE 50 MG: 50 TABLET ORAL at 21:22

## 2024-11-13 RX ADMIN — BUPROPION HYDROCHLORIDE 100 MG: 100 TABLET, FILM COATED ORAL at 08:19

## 2024-11-13 RX ADMIN — VALBENAZINE 40 MG: 40 CAPSULE ORAL at 21:20

## 2024-11-13 RX ADMIN — HYDRALAZINE HYDROCHLORIDE 10 MG: 10 TABLET ORAL at 13:19

## 2024-11-13 RX ADMIN — HYDRALAZINE HYDROCHLORIDE 10 MG: 10 TABLET ORAL at 06:26

## 2024-11-13 RX ADMIN — HEPARIN SODIUM 7500 UNITS: 5000 INJECTION INTRAVENOUS; SUBCUTANEOUS at 13:19

## 2024-11-13 RX ADMIN — BUPROPION HYDROCHLORIDE 100 MG: 100 TABLET, FILM COATED ORAL at 17:29

## 2024-11-13 RX ADMIN — DOCUSATE SODIUM 100 MG: 100 CAPSULE, LIQUID FILLED ORAL at 08:19

## 2024-11-13 RX ADMIN — DOCUSATE SODIUM 100 MG: 100 CAPSULE, LIQUID FILLED ORAL at 17:29

## 2024-11-13 NOTE — PROGRESS NOTES
Progress Note - Internal Medicine   Name: Tommie Taylor 59 y.o. male I MRN: [id number]  Unit/Bed#: -01 I Date of Admission: 10/26/2024   Date of Service: 11/13/2024 I Hospital Day: 18    Assessment & Plan  CVA (cerebral vascular accident) (HCC)  Initially presented with R sided weakness and R facial droop.   CTH and CTA negative for acute process.   MRI showed left corona radiata stroke.   Received TNK.    Completed 3 weeks of DAPT on 11/6.  Continue with ASA 81mg daily.  Continue statin.    Neurovascular checks Q shift.  Maintain normotension.    Follow up with Neurovascular in 4-6 weeks as outpatient.  Recommending Zio patch/Loop.     PT/OT/ST per primary team.     Complaints of worsening RLE weakness on 11/4 after therapies - CTH stable.  Noted to have orthostasis - recommend obtaining higher BP to prevent symptoms.  Bipolar I disorder, severe, current or most recent episode depressed, with psychotic features ([facility])  Currently on bupropion 100mg BID and olanzapine 10mg at HS with valbenazine tosylate 40mg for tardive dyskinesia per home regimen. Follow up outpatient with psychiatry.   GERD (gastroesophageal reflux disease)  EGD with GI 10/14, directed to continue with Protonix. Tums available PRN for dyspepsia.   Insomnia  Educate and encourage on sleep hygiene. Continue Trazodone at HS for sleep.   DAISY (obstructive sleep apnea)  Continue use of CPAP with home settings.  Does not use CPAP at home - currently broken.  Recommend overnight oximetry prior to discharge.   Follow-up with Sleep Medicine as outpatient.  Anemia, unspecified  Hgb currently 8.7.  Hemoglobin 12.1 on admission.   Most recent iron panel on 10/15: Iron Sat 38%, TIBC 204, Iron 77, and Ferritin 111.  No need for iron supplementation at this time.  FOBT 2 out of 3 negative.  3rd to be obtained.  Completed 3 doses of IV Venofer per Nephrology recs.  Continue to trend routine CBC.  Hyperlipidemia  Most recent lipid panel 10/16/24:  Cholesterol 154, triglycerides 120, HDL 40, LDL 90.  Continue Lipitor 40mg daily, increased from home 20mg daily.  Class 3 severe obesity due to excess calories with serious comorbidity and body mass index (BMI) of 40.0 to 44.9 in adult ([facility])  BMI 42.74 on admission. Encourage lifestyle modifications and provide support for nutritional teaching. Consult placed to nutrition services during acute course.   Tardive dyskinesia  Continue on home treatment Valbenazine Tosylate 40mg HS.   Hypertension  Presented with hypertensive emergency on initial hospitalization.   Concerns about medication non-compliance.  Home regimen: labetalol 100mg BID and amlodipine 10mg daily.  Currently on labetalol 300mg BID, Nifedipine 60mg daily, and hydralazine 10mg Q8 hours.  Nephrology following.   DM2 (diabetes mellitus, type 2) ([facility])  Lab Results   Component Value Date    HGBA1C 8.6 (H) 10/16/2024       Recent Labs     11/11/24  1618 11/12/24  0606 11/12/24  1606 11/13/24  0626   POCGLU 94 88 108 103       Blood Sugar Average: Last 72 hrs:  (P) 101    Had not been taking anything at home.  Ran out of Ozempic a few months ago.  BG has been well controlled.  Continue cons. carb diet and BID accuchecks.    Acute kidney injury superimposed on stage 4 chronic kidney disease ([facility])  Lab Results   Component Value Date    EGFR 16 11/12/2024    EGFR 15 11/11/2024    EGFR 17 11/08/2024    CREATININE 3.78 (H) 11/12/2024    CREATININE 3.91 (H) 11/11/2024    CREATININE 3.68 (H) 11/08/2024     Creatinine currently 3.78 from 3.91.  Baseline creatinine 2.5-2.9.    Avoid nephrotoxins.  Home diuretics currently on hold.  Encourage hydration.  Renal ultrasound demonstrates no hydro but mild bladder wall thickening.   Nephrology following.  Labs MWF per Nephro.  Follows with Dr. No (Nephrology) as outpatient.  Encephalopathy  Lethargy and encephalopathic symptoms noted during acute course.  B12 and head CT WNL. No subsequent episodes noted.   EEG on 10/22  "consistent with moderate nonspecific cerebral dysfunction, no seizure activity.  Continue to monitor behavior and symptoms for signs of changes.   Most recent MoCA was 1830 in 2024.  Per Psych - if continues to have delirium consider discontinuing Wellbutrin.  Concerns DAISY could also be contributing to encephalopathy per acute care.  Vitamin D deficiency  Vitamin D level <7 on 10/7.  Continue home vitamin D 50,000u weekly.  Continue to follow-up with Nephrology as outpatient.  Orthostatic hypotension  Orthostatic + on .  Continue to monitor orthostatics.  Apply abdominal binder/TEDs as needed.  Encourage PO hydration.    VTE Pharmacologic Prophylaxis:   Pharmacologic: Heparin  Mechanical VTE Prophylaxis in Place: Yes - sequential compression devices.    Current Length of Stay: 18 day(s)    Current Patient Status: Inpatient Rehab     Discharge Plan: As per primary team.    Code Status: Level 1 - Full Code    Subjective:   Pt examined while pt sitting in recliner in pt room.  Just complete OT session.  Currently has no complaints.  Feels that he is doing well in therapy.  Slept well last night.  Denies any lightheadedness, dizziness, SOB, palpitations, or CP.  Discussed BG control and that diet seems to be helping his blood sugars.  Pt admits that he does not eat like this at home and that he \"eats whatever he wants.\"  Discussed importance of glucose control to prevent further stroke and offered nutrition consult but pt declines wanting to change his diet at home at this time.    Objective:     Vitals:   Temp (24hrs), Av.4 °F (36.3 °C), Min:97.3 °F (36.3 °C), Max:97.5 °F (36.4 °C)    Temp:  [97.3 °F (36.3 °C)-97.5 °F (36.4 °C)] 97.5 °F (36.4 °C)  HR:  [72-77] 72  Resp:  [18] 18  BP: (131-171)/(75-88) 158/77  SpO2:  [97 %-98 %] 97 %  Body mass index is 42.74 kg/m².     Review of Systems   Constitutional:  Negative for appetite change, chills, fatigue and fever.   HENT:  Negative for trouble swallowing.  "   Eyes:  Negative for visual disturbance.   Respiratory:  Negative for cough, shortness of breath, wheezing and stridor.    Cardiovascular:  Negative for chest pain, palpitations and leg swelling.   Gastrointestinal:  Negative for abdominal distention, abdominal pain, constipation, diarrhea, nausea and vomiting.        LBM 11/11   Genitourinary:  Negative for difficulty urinating.   Musculoskeletal:  Negative for arthralgias, back pain and gait problem.   Neurological:  Positive for weakness (RUE weakness). Negative for dizziness, light-headedness, numbness and headaches.   Psychiatric/Behavioral:  Negative for dysphoric mood and sleep disturbance. The patient is not nervous/anxious.    All other systems reviewed and are negative.       Input and Output Summary (last 24 hours):       Intake/Output Summary (Last 24 hours) at 11/13/2024 0917  Last data filed at 11/13/2024 0700  Gross per 24 hour   Intake 440 ml   Output 1900 ml   Net -1460 ml       Physical Exam:     Physical Exam  Vitals and nursing note reviewed.   Constitutional:       General: He is not in acute distress.     Appearance: Normal appearance. He is obese. He is not ill-appearing.   HENT:      Head: Normocephalic and atraumatic.   Cardiovascular:      Rate and Rhythm: Normal rate and regular rhythm.      Pulses: Normal pulses.      Heart sounds: Normal heart sounds. No murmur heard.     No friction rub.   Pulmonary:      Effort: Pulmonary effort is normal. No respiratory distress.      Breath sounds: Normal breath sounds. No wheezing or rhonchi.   Abdominal:      General: Abdomen is flat. Bowel sounds are normal. There is no distension.      Palpations: Abdomen is soft. There is no mass.      Tenderness: There is no abdominal tenderness. There is no guarding or rebound.      Hernia: No hernia is present.   Musculoskeletal:      Cervical back: Normal range of motion and neck supple. No tenderness.      Right lower leg: Edema (+1 pitting edema) present.       Left lower leg: Edema (+1 pitting edema) present.   Skin:     General: Skin is warm and dry.   Neurological:      Mental Status: He is alert and oriented to person, place, and time.      Cranial Nerves: Dysarthria and facial asymmetry present.      Motor: Weakness (RUE weakness) present.   Psychiatric:         Mood and Affect: Mood normal.         Behavior: Behavior normal.         Additional Data:     Labs:    Results from last 7 days   Lab Units 11/11/24  0548   WBC Thousand/uL 6.15   HEMOGLOBIN g/dL 8.7*   HEMATOCRIT % 25.9*   PLATELETS Thousands/uL 159   SEGS PCT % 59   LYMPHO PCT % 24   MONO PCT % 12   EOS PCT % 3     Results from last 7 days   Lab Units 11/12/24  0555   SODIUM mmol/L 138   POTASSIUM mmol/L 4.5   CHLORIDE mmol/L 107   CO2 mmol/L 23   BUN mg/dL 38*   CREATININE mg/dL 3.78*   ANION GAP mmol/L 8   CALCIUM mg/dL 8.8   GLUCOSE RANDOM mg/dL 83         Results from last 7 days   Lab Units 11/13/24  0626 11/12/24  1606 11/12/24  0606 11/11/24  1618 11/11/24  0633 11/10/24  2103 11/10/24  1620 11/10/24  0609 11/09/24  1558 11/09/24  0556 11/08/24  1603 11/08/24  0618   POC GLUCOSE mg/dl 103 108 88 94 93 111 106 105 97 131 131 104               Labs reviewed    Imaging:    Imaging reviewed    Recent Cultures (last 7 days):           Last 24 Hours Medication List:   Current Facility-Administered Medications   Medication Dose Route Frequency Provider Last Rate    acetaminophen  650 mg Oral Q6H PRN Alex S George, DO      aspirin  81 mg Oral Daily Alex S George, DO      atorvastatin  40 mg Oral QPM Alex S George, DO      buPROPion  100 mg Oral BID Alex S George, DO      calcium carbonate  1,000 mg Oral BID PRN Alex S George, DO      docusate sodium  100 mg Oral BID Cathy Miranda MD      fluticasone  1 spray Each Nare Daily SAMANTHA Kern      heparin (porcine)  7,500 Units Subcutaneous Q8H MIGEL Alex S Ariel, DO      hydrALAZINE  10 mg Oral Q8H [facility] SAMANTHA Kern      labetalol   300 mg Oral BID Alex George, DO      lactulose  20 g Oral TID PRN Cathy Miranda MD      loratadine  5 mg Oral Daily SAMANTHA Kern      melatonin  3 mg Oral HS SAMANTHA Kern      NIFEdipine  60 mg Oral Daily SAMANTHA Kern      OLANZapine  10 mg Oral HS Alex George, DO      ondansetron  4 mg Oral Q6H PRN SAMANTHA Kern      pantoprazole  40 mg Oral Early Morning Alex George, DO      polyethylene glycol  17 g Oral Daily Cathy Miranda MD      senna  2 tablet Oral HS Cathy Miranda MD      traZODone  50 mg Oral HS PRN Alex George, DO      Valbenazine Tosylate  40 mg Oral HS Alex George, DO          M*Modal software was used to dictate this note.  It may contain errors with dictating incorrect words or incorrect spelling. Please contact the provider directly with any questions.

## 2024-11-13 NOTE — ASSESSMENT & PLAN NOTE
Creatinine is now within baseline and ROSINA thought to be related to autoregulatory failure and ATN  Noted nephrotic range proteinuria underlying disease most likely secondary to diabetic kidney disease.

## 2024-11-13 NOTE — TEAM CONFERENCE
Acute RehabilitationTeam Conference Note  Date: 11/13/2024   Time: 10:09 AM       Patient Name:  Tommie Taylor       Medical Record Number: [id number]   YOB: 1965  Sex: Male          Room/Bed:  [facility] 261/[facility] 261-01  Payor Info:  Payor: MEDICARE / Plan: MEDICARE A AND B / Product Type: Medicare A & B Fee for Service /      Admitting Diagnosis: CVA (cerebral vascular accident) ([facility]) [I63.9]   Admit Date/Time:  10/26/2024  5:13 PM  Admission Comments: No comment available     Primary Diagnosis:  CVA (cerebral vascular accident) ([facility])  Principal Problem: CVA (cerebral vascular accident) ([facility])    Patient Active Problem List    Diagnosis Date Noted    Spasticity 11/13/2024    Chronic kidney disease 11/09/2024    Chronic kidney disease-mineral and bone disorder (CKD-MBD) 11/08/2024    Orthostatic hypotension 11/05/2024    Fall during current hospitalization 10/31/2024    At risk for alteration in bowel function 10/28/2024    Overgrown toenails 10/28/2024    Impaired mobility and activities of daily living 10/26/2024    Stroke-like symptoms 10/22/2024    Acute kidney injury superimposed on stage 4 chronic kidney disease (HCC) 10/17/2024    Encephalopathy 10/17/2024    Volume overload 10/17/2024    Acid-base disorder, mixed 10/17/2024    CVA (cerebral vascular accident) ([facility]) 10/15/2024    Type 2 diabetes mellitus with stage 4 chronic kidney disease and hypertension ([facility]) 10/08/2024    Vision decreased 06/05/2024    Memory deficit 04/15/2024    Tremor 04/15/2024    B12 deficiency 04/15/2024    Hypertension 11/27/2023    Tardive dyskinesia 08/16/2023    Lightheaded 08/02/2023    Loss of appetite 08/02/2023    Unintended weight loss 08/02/2023    Class 3 severe obesity due to excess calories with serious comorbidity and body mass index (BMI) of 40.0 to 44.9 in adult ([facility]) 03/01/2023    Vitamin D deficiency 04/09/2021    Hyperlipidemia 02/11/2020    Toenail deformity 11/01/2019    Persistent proteinuria  08/28/2019    Anemia, unspecified 08/28/2019    Elevated serum creatinine 04/17/2019    Stage 4 chronic kidney disease ([facility]) 04/17/2019    DAISY (obstructive sleep apnea)     Generalized anxiety disorder 06/19/2018    Hiatal hernia 06/17/2018    Lower esophageal ring ([place]) 06/17/2018    Insomnia 05/14/2018    Bipolar I disorder, severe, current or most recent episode depressed, with psychotic features ([facility]) 11/06/2017    Panic attacks 11/06/2017    GERD (gastroesophageal reflux disease) 09/05/2017    Flat foot 05/03/2016    Diabetic polyneuropathy ([facility]) 12/11/2014    DM2 (diabetes mellitus, type 2) ([facility]) 12/11/2014    Allergic rhinitis 08/31/2012       Physical Therapy:    Weight Bearing Status: Full Weight Bearing  Transfers: Incidental Touching  Bed Mobility: Incidental Touching  Amulation Distance (ft): 100 feet  Ambulation: Incidental Touching, Minimal Assistance  Assistive Device for Ambulation:  (no device)  Wheelchair Mobility Distance: 50 ft  Wheelchair Mobility: Minimal Assistance  Number of Stairs: 8  Assistive Device for Stairs: Lehft Hand Rail  Stair Assistance: Incidental Touching  Ramp: Minimal Assistance  Discharge Recommendations: Home with:  DC Home with:: Family Support, Home Physical Therapy    58 y/o male pt who presented to [facility] on 10/14/2024 via EMS as prehospital stroke alert d/t fall following episode of dizziness and sudden onset of right sided weakness. He was administered TNK after BP was controlled. Following TNK, patient developed worsening dysarthria and headache. Repeat CTH was unremarkable. MRI of brain showed acute left corona radiata infarct. ECHO showed EF 70%, moderate concentric hypertrophy, grade 2 diastolic dysfunction, mild AVR. At baseline pt was I without AD lives with disabled spouse uses power chair and HD, 2SH with 1+1 ASHOK front door. Can have first floor setup if needed. Questionable support at ND.     10/29/24  Pt with hypertonic R UE, R  hemiparesis limiting strength, balance, and ambulation. Slow progress with amb limited by Ftg and R LE weakness. Pt to greatly benefit from continued skilled PT intervention to maximize post stroke recovery, questionable family support at DC, will need to establish what support pt has to unsure safe DC home when physically appropriate. Reteam.     11/5/24  Pt showing progress with shorter distance ambulation requires CG level but still Min/ Mod assist with longer distances because blood pressure is dropping and pt drags R leg more.  Pt still needs Min A on steps which he does have steps at home.  Need to contact family to assess if they will be able to provide S at all times for pts safety and fall reduction.  Pt will need to continue stroke rehab to reach full supervision level.  Pts having orthostatic hypotension and much fatigue which has limited past 2 days of therapy with any progression.  Cont gait training/ steps/ ramp/ and transfers to reach supervision level.  Pt also needs to work on balance and reaction to LOB as pt is very high fall risk.  Pt also showing decrease memory which is impacting carryover.  Questionable family support and family expectations of pt at dc, can have first floor setup. Suggest reteam to optimize post stroke functional outcomes.     11/12/24:  Tommie is progressing well towards goals but does continue to fatigue easily and requires frequent rest breaks for energy recovery between activities. He requires CGA for short distance gait, with gait deficits worsening as he fatigues; hence recommending household distances at this time for reduced risk for falls. He does demo intermittent signficiant posterior LOB during unsupported dynamic activities with limited/absent balance reactions noted and requiring max A to return to chair. He will continue to benefit from skilled PT interventions to address deficits, reduce risk for falls, and maximize functional independence. Plan is for family  training to be completed on Friday and DC home Monday 11/18/24 with home health services.    Occupational Therapy:  Eating: Supervision (set-up)  Grooming: Supervision  Bathing: Moderate Assistance  Bathing: Moderate Assistance  Upper Body Dressing: Minimal Assistance  Lower Body Dressing: Minimal Assistance (footwear mod A)  Toileting: Moderate Assistance (intermittent incontinence of urine, having BM infrequently whihc he reports is baseline)  Tub/Shower Transfer: Minimal Assistance  Toilet Transfer: Minimal Assistance  Cognition: Exceptions to WNL  Cognition: Decreased Memory, Decreased Executive Functions, Decreased Attention, Decreased Safety, Decreased Comprehension  Orientation: Person, Place, Time, Situation  Discharge Recommendations: Home with:  DC Home with:: 24 Hour Assistance, Family Support, First Floor Setup, Home Occupational Therapy       10/29/24  59 y.o male admitted with chief complaint of dizziness and sudden onset right sided weakness.MRI brain showed acute left corona radiata infarct. Pt has a past medical history significant for: ADHD, anxiety, bipolar disorder, CKD4, CVA, tardive dyskinesia, depression, DM, GERD, HLD, HTN, neuropathy, sleep apnea with CPAP. PRECAUTIONS: fall risk, impulsive. Pt presents with the following impairments: balance, strength, endurance, cognition, impaired tone, motor control. Pt would benefit from skilled occupational therapy services with focus on ADL retraining, strength, balance, functional transfers, NMR and cognition to ensure safe discharge and participation in functional activities to increase independence. Unclear if family will be equipped to assist pt at home safely. Pt overall requiring mod-maxA for basic ADL, not yet ambulatory w/ ADL routine. Anticipate 17 to 21 day ELOS to meet supervision level goals.     11/5/24  Pt continues to demonstrate progress toward functional goals through training in compensatory strategies and one-handed estefania techniques,  use of estim and NMR techniques to improve active muscle recruitment in LUE, ability to utilize the limb as a stabilizing assist during functional tasks, graded balance tasks in seated and standing positions. OT plans to focus on education with family in the next week regarding Pt's CLOF and potential need for assistance in the home following his stay at [facility] to determine if they are capable of providing that level of assistance or if Pt will require subacute rehab prior to returning home. Pt's CLOF is min A overall for BADLs and mobility, except for toileting which is mod A due to intermittent incontinence episodes.    Speech Therapy:  Mode of Communication: Verbal  Speech/Language: Dysarthia  Cognition: Exceptions to WNL  Cognition: Decreased Memory, Decreased Executive Functions, Decreased Attention, Decreased Comprehension  Orientation: Place, Time, Situation, Person  Discharge Recommendations: Home with:  DC Home with:: 24 Hour Supervision, 24 Hour Assisteance, Family Support, Home Speech Therapy, Outpatient Speech Therapy  Pt currently being followed for cognitive and speech tx sessions. In regard to cognitive skills, pt was able to complete formalized cognitive assessment, CLQT+ on initial evaluation with a Composite Severity Rating score of 1.2 out of 4.0, correlating to overall severely impaired cognitive linguistic impairments at time of evaluation and in comparison to age matched peers ranging from 18-68 y/o. Cognitive barriers which present include: decreased attention, ST memory recall , problem solving, reasoning, sequencing, organization of thoughts, judgement, slower processing, insight, and as well as fatigue, which still impacts pt's overall safety, functional cognitive communication skills as well as functional mobility. The following interventions are used to target these barriers, including verbal problem solving task, verbal working memory tasks, visual memory recall tasks, drawing conclusions  activities, written sequencing tasks, verbal sequencing tasks, categorization tasks , picture problem solving activities, verbal reasoning tasks, verbal review of current medications,  written health management tasks, verbal health management tasks, functional reading tasks , and family education/training.     Additionally, pt completed Motor Speech Evaluation, in which pt is demonstrating speech intelligibility to be moderate-severely impaired at the sentence and conversational level. Pt elicited the following errors imprecise articulation, distorted speech sounds, decreased breath support for speech and fast rate of speech. Pt noted to have decrease production and sentence and conversation level over time in evaluation with pt getting more fatigued. Pt reports insight with decrease intelligibility. Speech barriers which present include: decreased intelligibility decreased at word, sentence and conversational level, imprecise articulation , and faster rate of speech which still impacts pt's overall safety, functional cognitive communication skills as well as functional mobility.  The following interventions are used to target these barriers, including review of speech strategies for carryover in conversation, OME review and speech drills.     Current level of functioning:   Comprehension: min A  Expression: mod-max A  Social Interaction: min A  Executive functions: mod A   Memory: mod A      This week, focus for continued services to target review of OME's, speech strategies, use of speech drills to maximize speech intelligibility. Also will initiate targeting cognitive skills targeting ST recall, problem solving, organization of thoughts, sequencing, as well as maximizing more insight to stroke education and prevention. Family training/education has not been initiated with pt's family but will be needed throughout pt's stay and in preparation for discharge. Recommendations at time of discharge are for continued  skilled SLP services but pending progress to determine home vs OP.    At this time, pt will continue to benefit from skilled cognitive linguistic tx and speech/apraxia tx session to maximize overall functional independence given overall cognitive linguistic skills and speech and intelligibility in attempts to decreased caregiver burden over time.           Update from week 11/5/2024: Pt is being followed for cognitive linguistic and speech sessions to where pt is making slower and steady progress this week.     Continued cognitive barriers which present include: decreased attention, ST memory recall , problem solving, reasoning, sequencing, organization of thoughts, judgement, slower processing, insight, and as well as fatigue, which impact pt's overall safety, functional cognitive communication skills and functional mobility. The following interventions are used to target these barriers, including verbal problem solving task, verbal working memory tasks, visual memory recall tasks, drawing conclusions activities, written sequencing tasks, verbal sequencing tasks, categorization tasks , picture problem solving activities, verbal reasoning tasks, written financial management tasks, verbal review of current medications, written review of medications, written calendar tasks, tangible scheduling tasks ,  written health management tasks, verbal health management tasks, visual attention tasks, functional reading tasks , and family education/training.     Continued speech barriers which present include: imprecise articulation, distorted speech sounds, decreased breath support for speech and fast rate of speech, which impact pt's overall functional communication skills as his verbal expression continues to be limited by at least moderate dysarthria, noted at the phrase and sentence level with more severe deficits in speech intelligibility at the conversational level. The following interventions are used to target these  barriers, including including review of speech strategies for carryover in conversation, OME review and speech drills.     Current level of functioning:   Comprehension: min A  Expression: min A-supervision  Social Interaction: min A-supervision  Executive functions: mod A  Memory: mod A    Family training/education has not yet been initiated, however, plan to begin with pt's wife this week. Current recommendations at time of discharge are for pt to have assistance with all IADLs due to cognitive linguistic deficits. This week, focus for continued services to target initiating higher level IADL skills and assessment of tasks such as finance management, medication management, and planning/managing appts, along with continued focus on memory, safety, problem solving and speech intelligibility. At this time, pt is recommended for and will continue to benefit form further skilled SLP services focusing on overall cognitive linguistic skills and speech intelligibility skills in order to maximize independence on discharge.       Update from week: 11/13/2024. Pt is being followed for cognitive linguistic tx and speech tx sessions to where pt is making slower and steady progress this week.     Continued cognitive barriers which present include: decreased attention, visual attention, ST memory recall , problem solving, reasoning, sequencing, organization of thoughts, judgement, slower processing, and insight, which still impacts pt's overall safety, functional cognitive communication skills as well as functional mobility. The following interventions are used to target these barriers, including verbal problem solving task, drawing conclusions activities, verbal sequencing tasks, categorization tasks , picture problem solving activities, verbal reasoning tasks, verbal review of current medications, written review of medications, visual retraining activities.,  written health management tasks, verbal health management tasks,  visual attention tasks, functional reading tasks , and family education/training.     Continued speech barriers which present include: decreased intelligibility decreased at conversational level, imprecise articulation , and faster rate of speech which still impacts pt's overall safety, functional cognitive communication skills as well as functional mobility.  The following interventions are used to target these barriers, including OME review, speech strategies, speech drills, engagement in spontaneous speech activities.     Current level of functioning:   Comprehension: min A   Expression: supervision-min A  Social Interaction: supervision  Executive functions: mod A  Memory: mod A    Family training/education had been initiated with pt's spouse, but will need to touch base given recommendations for spouse to complete I ADL tasks for improvement in compliance in medication management, appointment scheduling, finances, etc. Pt has been exhibiting difficulty in visual perceptual tasks which will also impact his ability to complete reading comprehension given information on pill bottles, bills, etc. While overall speech intelligibility has improved, overall consistent carryover of speech strategies is decreased. Recommendations at time of discharge are for continued skilled ST services (home vs OP). This week, focus for continued services to target ongoing review of speech strategies to maximize overall intelligibility, review of medications for improving comprehension of reasoning for taking medications, ongoing functional problem solving, reasoning skills and ST recall strategies. At this time, pt will continue to benefit from skilled cognitive linguistic tx and speech tx session to maximize overall functional independence given overall cognitive linguistic skills and speech intelligibility in attempts to decreased caregiver burden over time.       Nursing Notes:  Appetite: Good  Diet Type: Diabetic, Thin Liquids                                                                      Pain Location/Orientation: Location: Head  Pain Score: 0                       Hospital Pain Intervention(s): Medication (See MAR), Repositioned          Tommie Taylor is a 59 y.o. male with history of bipolar disorder, CKD stage IV, type 2 diabetes, hypertension, tardive dyskinesia, sleep apnea, GERD who presented to the [facility] on 10/14/2024 for right-sided weakness and facial droop.  MRI revealed a left corona radiata infarct and neurology was consulted and was administered TNK.  He initially required a Cardene drip for his hypertensive emergency and was eventually placed on dual antiplatelet therapy with plan to continue aspirin and Plavix until that date and continue with aspirin as monotherapy as well as atorvastatin for secondary stroke prophylaxis.  He was recommended for Zio patch placement as an outpatient.  His hospital course was complicated by acute kidney injury on top of his CKD thought to be related to ATN in the setting of uncontrolled blood pressure as well as contrast associated nephropathy.  Nephrology did follow during his course and had some levels of improvement however has not returned back to his baseline level of creatinine.  Additionally there was some levels of encephalopathy which are stable at this time. The patient was evaluated by the Rehabilitation team and deemed an appropriate candidate for comprehensive inpatient rehabilitation and admitted to the [facility] on 10/26/2024  5:13 PM      Pain managed with tylenol 650mg PRN Q6h. DVT prophylaxis heparin 7,500 Q8h. CVA managed with ASA 81 mg daily, lipitor 40 mg daily, plavix 75 mg daily, heparin. Recommendation for a Zio patch as an outpatient. DM managed with Accu-Checks BID, as well as a diabetic diet. HTN managed with hydralazine 10 mg Q8h, labetalol 300 mg BID, and nifedipine 60 mg daily. Tardive dyskinesia managed with Valbenazine Tosylate  40 mg daily at bedtime. HLD managed with lipitor 40 mg every evening. DAISY managed with CPAP. Insomnia managed with trazadone 50 mg PRN at bedtime and Melatonin 3 mg. GERD managed with protonix 40 mg daily and PRN TUMS. Bipolar I disorder managed with Zyprexa 10 mg daily at bedtime and bupropion 100 mg BID. PRN Miralax 17 g packet for constipation and lactulose oral solution 20 g 3 times daily prn.     This week we will monitor vital signs and lab values. We will keep patient safe from falls by continuing with hourly rounding, maintaining bed/chair alarms, and keeping call bell within reach. We will educate patient on importance of turning and repositioning to off load pressure to prevent skin break down. We will manage pain so that patient may perform optimally in therapy sessions. We will teach patient energy conservation and promote independence of ADLs.     Case Management:     Discharge Planning  Living Arrangements: Lives w/ Spouse/significant other, Lives w/ Family members  Support Systems: Self, Spouse/significant other, Family members  Assistance Needed: to be determined  Type of Current Residence: Private residence  Current Home Care Services: No  Patient admitted to [facility] on 10/26/24 after inpatient hospital stay for stroke. Patient lives with wife in 2SH with 1STE full flight of stairs to second floor. Full bath with walk in shower on the first floor. Patient uses CPAP at HS.  Patient restless at times. [facility] vs SNF    11/12- Pt to dc Monday 11/18 with home PT OT DESHAWN CRANDALL, added to DCI. Transfer bench ordered for shower, [employer] judy also provided.     Is the patient actively participating in therapies? yes      Barriers Interventions   kidney function, fluctuating BP, bowel and bladder incontinence Nephrology on board, ILANA may  B/B frequent reminders   Right hemiparesis NPP neuro re education, patient educ, E stim   Decreased attention, insught, prob solving, judgement, sTR Reinforcing treatments,  education, famiy education,assist with IADL's, sequencing atctiviteis, resoning asks, medication review   Mild dysarthric,  Speech strategies, speech drills             Is the patient making expected progress toward goals? yes  List any update or changes to goals: PT: complete family training for S at discharge  OT:Improve toileting to be S  SP:min A executive functions and memory    Medical Goals: Patient will be medically [facility] completion of [facility] program and Patient will be able to manage medical conditions and comorbid conditions with medications and follow up upon completion of rehab program    Weekly Team Goals:   Rehab Team Goals  ADL Team Goal: Patient will require supervision with ADLs with least restrictive device upon completion of rehab program  Transfer Team Goal: Patient will require supervision with transfers with least restrictive device upon completion of rehab program  Locomotion Team Goal: Patient will require supervision with locomotion with least restrictive device upon completion of rehab program  Cognitive Team Goal: Patient will be independent for basic  tasks and require supervision for complex tasks upon completion of rehab program    Discussion: PT: CG transfers, min A amb no device, stairs for 2 steps  OTR: set up A eating and grooming, Mod A toileting, , min A bathing, dressing, max A with footwear  ST: fluctuating min to mod A cognitive skills    Anticipated Discharge Date:  11/18 home RN/PT/OT/ST  [facility] Team Members Present:The following team members are supervising care for this patient and were present during this Weekly Team Conference.    Physician: Dr. Ruben MD  : Sol Kumari, RN  Registered Nurse: Jess Mazariegos, RN, BSN, CRRN  Physical Therapist: Ciara Antony PT  Occupational Therapist: Luanne Sidhu MS, OTR/L  Speech Therapist: Bettina Ugner MA, CCC-SLP

## 2024-11-13 NOTE — PROGRESS NOTES
"   11/13/24 1006   Pain Assessment   Pain Assessment Tool 0-10   Pain Score No Pain   Restrictions/Precautions   Precautions Bed/chair alarms;Cognitive;Fall Risk;Supervision on toilet/commode   Comprehension   Comprehension (FIM) 4 - Understands basic info/conversation 75-90% of time   Expression   Expression (FIM) 5 - Needs help/cues only RARELY (< 10% of the time)   Social Interaction   Social Interaction (FIM) 5 - Interacts appropriately with others 90% of time   Problem Solving   Problem solving (FIM) 4 - Solves basic problems 75-89% of time   Memory   Memory (FIM) 3 - Recognizes, recalls/performs 50-74%   Speech/Language/Cognition Assessmetn   Treatment Assessment Pt was in recliner upon arrival to room, in which pt's FELIPE was much improved from when current SLP had worked w/ pt last. Pt wanting to go to the therapy gym for session today. As current SLP has not seen pt is some time, engaged in rapport building w/ pt to where he was able to recall discharge date this upcoming Monday (11/18) and is \"ready to get out of here.\" When asking pt about what he is most looking forward to upon discharge, pt verbalizing about food. SLP did provide likely re-education in regard to diet modifications s/p stroke, such as watching sodium and cholesterol levels, which pt acknowledged but SLP aware of likely non-compliance.     Pt completing WC ambulation to therapy gym to where SLP attempting to revisit completing a reading task. SLP did bring glasses w/ to session in which pt continued to report that he was having difficulty w/ and w/o them. SLP then completing task verbally. Task at hand was a drawing conclusion task to where a statement category was provided w/ FO4 words to choose to include given the category. Pt's ability to verbalize words whic fit the category presented was 45/48 accurate, increasing to 48/48 accurate upon further probing questions to responses.     Next task which SLP engaged pt in was a category matrix " activity, to where 4 different categories and then 4 letters to begin each word. When completing the first set, pt was noted to be 14/16 accurate in ability to name items per category given initial letters. When providing pt semantic cue x1, increased to 16/16 accuracy. When completing this task again given new set of 4 categories and then 4 different letters to begin each word, pt remained 14/16 accurate, still able to increase to 16/16 given single semantic cue for each missed item. In addition to cognitive tasks completed throughout session today, it was noted that pt was exhibiting self correction when not exhibiting clear speech. Pt was ~75% accurate in this throughout session. Overall intelligibility was much improved from when current SLP had last worked w/ pt. At this time, pt will continue to benefit from ongoing skilled SLP services targeting functional cognitive skills in addition to maximizing speech clarity in conversation in hopes for decreasing burden of care over time.   SLP Therapy Minutes   SLP Time In 0830   SLP Time Out 0900   SLP Total Time (minutes) 30   SLP Mode of treatment - Individual (minutes) 30   SLP Mode of treatment - Concurrent (minutes) 0   SLP Mode of treatment - Group (minutes) 0   SLP Mode of treatment - Co-treat (minutes) 0   SLP Mode of Treatment - Total time(minutes) 30 minutes   SLP Cumulative Minutes 425   Therapy Time missed   Time missed? No

## 2024-11-13 NOTE — PLAN OF CARE
Problem: SAFETY ADULT  Goal: Patient will remain free of falls  Description: INTERVENTIONS:  - Educate patient/family on patient safety including physical limitations  - Instruct patient to call for assistance with activity   - Consult OT/PT to assist with strengthening/mobility   - Keep Call bell within reach  - Keep bed low and locked with side rails adjusted as appropriate  - Keep care items and personal belongings within reach  - Initiate and maintain comfort rounds  - Make Fall Risk Sign visible to staff  - Offer Toileting every 2-4 Hours, in advance of need  - Initiate/Maintain bed/chair alarm  - Apply yellow socks and bracelet for high fall risk patients  - Consider moving patient to room near nurses station  Outcome: Progressing

## 2024-11-13 NOTE — PROGRESS NOTES
Progress Note - Nephrology   Name: Tommie Taylor 59 y.o. male I MRN: [id number]  Unit/Bed#: -01 I Date of Admission: 10/26/2024   Date of Service: 11/13/2024 I Hospital Day: 18    Assessment & Plan  Acute kidney injury superimposed on stage 4 chronic kidney disease (HCC)  Creatinine is now within baseline and ROSINA thought to be related to autoregulatory failure and ATN  Noted nephrotic range proteinuria underlying disease most likely secondary to diabetic kidney disease.  Chronic kidney disease  Lab Results   Component Value Date    EGFR 16 11/12/2024    EGFR 15 11/11/2024    EGFR 17 11/08/2024    CREATININE 3.78 (H) 11/12/2024    CREATININE 3.91 (H) 11/11/2024    CREATININE 3.68 (H) 11/08/2024   Stage 4  Follows with Dr. No outpatient  Likely secondary to diabetic nephropathy and hypertensive nephrosclerosis  Hypertension  Initially with hypertensive emergency  Currently on labetalol 300 mg p.o. twice daily, nifedipine 60 mg p.o. daily, hydralazine 10 mg p.o. every 8hr  His standing blood pressure is around 120/80, otherwise he has blood pressures in the 1 60-1 70 systolic range, will avoid tight blood pressure control while inpatient and rehab uptitrate hydralazine as needed  Orthostatic hypotension    Will avoid overaggressive BP management  Chronic kidney disease-mineral and bone disorder (CKD-MBD)  Phosphorus stable   Anemia, unspecified  Stool for occult blood negative  SPEP negative for monoclonal gammopathy  Iron saturation 38% with a ferritin of 111.  Continue with Venofer.  KOBI relatively contraindicated given recent CVA.  Hgb remains below goal  Transfuse as needed per primary team  CVA (cerebral vascular accident) (HCC)  Status post TNK administration  Management as per primary service and neurology.  Bipolar I disorder, severe, current or most recent episode depressed, with psychotic features (HCC)  Follow-up with psych  Currently on Zyprexa and Wellbutrin  DM2 (diabetes mellitus, type 2)  ([facility])  Lab Results   Component Value Date    HGBA1C 8.6 (H) 10/16/2024   Blood Sugar Average: Last 72 hrs:  (P) 101  Continue on insulin  most recent A1c 8.6% as of October 2024  Advised of tight glycemic control  Impaired mobility and activities of daily living  Currently with wheelchair  Follow-up with [facility] rehab  Hyperlipidemia  Continue on atorvastatin  Goal LDL less than 70      Subjective     The patient was seen today he is resting the patient was seen today he is resting comfortably no acute chest pain or shortness of breath watching TV, no lightheadedness upon getting up, blood pressure fluctuates but no acute complaints    Objective :  Temp:  [95.8 °F (35.4 °C)-97.5 °F (36.4 °C)] 95.8 °F (35.4 °C)  HR:  [67-77] 67  BP: (154-177)/(75-89) 155/78  Resp:  [18] 18  SpO2:  [97 %-98 %] 98 %  O2 Device: None (Room air)    Current Weight: Weight - Scale: 120 kg (264 lb 12.8 oz)  First Weight: Weight - Scale: 120 kg (264 lb 12.8 oz)  I/O         11/11 0701  11/12 0700 11/12 0701  11/13 0700 11/13 0701  11/14 0700    P.O. 1220 440 360    Total Intake(mL/kg) 1220 (10.2) 440 (3.7) 360 (3)    Urine (mL/kg/hr) 750 (0.3) 1900 (0.7)     Total Output 750 1900     Net +470 -1460 +360           Unmeasured Urine Occurrence 3 x  1 x    Unmeasured Stool Occurrence 1 x            Physical Exam  Vitals and nursing note reviewed.   Constitutional:       General: He is not in acute distress.     Appearance: He is well-developed.   HENT:      Head: Normocephalic and atraumatic.   Eyes:      Conjunctiva/sclera: Conjunctivae normal.   Cardiovascular:      Rate and Rhythm: Normal rate and regular rhythm.      Heart sounds: No murmur heard.  Pulmonary:      Effort: Pulmonary effort is normal. No respiratory distress.      Breath sounds: Normal breath sounds.   Abdominal:      Palpations: Abdomen is soft.      Tenderness: There is no abdominal tenderness.   Musculoskeletal:         General: No swelling.      Cervical back: Neck supple.    Skin:     General: Skin is warm and dry.      Capillary Refill: Capillary refill takes less than 2 seconds.   Neurological:      Mental Status: He is alert.   Psychiatric:         Mood and Affect: Mood normal.       Medications:    Current Facility-Administered Medications:     acetaminophen (TYLENOL) tablet 650 mg, 650 mg, Oral, Q6H PRN, Alex S Ariel, DO, 650 mg at 11/07/24 0737    aspirin (ECOTRIN LOW STRENGTH) EC tablet 81 mg, 81 mg, Oral, Daily, Alex S George, DO, 81 mg at 11/13/24 0820    atorvastatin (LIPITOR) tablet 40 mg, 40 mg, Oral, QPM, Alex S Ariel, DO, 40 mg at 11/12/24 1801    buPROPion (WELLBUTRIN) tablet 100 mg, 100 mg, Oral, BID, Alex George, DO, 100 mg at 11/13/24 0819    calcium carbonate (TUMS) chewable tablet 1,000 mg, 1,000 mg, Oral, BID PRN, Alex George DO    docusate sodium (COLACE) capsule 100 mg, 100 mg, Oral, BID, Cathy Miranda MD, 100 mg at 11/13/24 0819    fluticasone (FLONASE) 50 mcg/act nasal spray 1 spray, 1 spray, Each Nare, Daily, SAMANTHA Kern, 1 spray at 11/13/24 0819    heparin (porcine) subcutaneous injection 7,500 Units, 7,500 Units, Subcutaneous, Q8H MIGEL, Alex George DO, 7,500 Units at 11/13/24 1319    hydrALAZINE (APRESOLINE) tablet 10 mg, 10 mg, Oral, Q8H MIGEL, SAMANTHA Kern, 10 mg at 11/13/24 1319    labetalol (NORMODYNE) tablet 300 mg, 300 mg, Oral, BID, Alex George DO, 300 mg at 11/13/24 0819    lactulose (CHRONULAC) oral solution 20 g, 20 g, Oral, TID PRN, Cathy Miranda MD, 20 g at 11/11/24 1707    loratadine (CLARITIN) tablet 5 mg, 5 mg, Oral, Daily, SAMANTHA Kern, 5 mg at 11/13/24 0820    melatonin tablet 3 mg, 3 mg, Oral, HS, SAMANTHA Kern, 3 mg at 11/12/24 2136    NIFEdipine (PROCARDIA XL) 24 hr tablet 60 mg, 60 mg, Oral, Daily, SAMANTHA Kern, 60 mg at 11/13/24 0820    OLANZapine (ZyPREXA) tablet 10 mg, 10 mg, Oral, HS, Alex George DO, 10 mg at 11/12/24 2136    ondansetron  "(ZOFRAN-ODT) dispersible tablet 4 mg, 4 mg, Oral, Q6H PRN, SAMANTHA Kern    pantoprazole (PROTONIX) EC tablet 40 mg, 40 mg, Oral, Early Morning, Alex George, DO, 40 mg at 11/13/24 0626    polyethylene glycol (MIRALAX) packet 17 g, 17 g, Oral, Daily, Cathy Miranda MD, 17 g at 11/13/24 0819    senna (SENOKOT) tablet 17.2 mg, 2 tablet, Oral, HS, Cathy Miranda MD, 17.2 mg at 11/12/24 2136    traZODone (DESYREL) tablet 50 mg, 50 mg, Oral, HS PRN, Alex George, DO, 50 mg at 11/12/24 2136    Valbenazine Tosylate CAPS 40 mg, 40 mg, Oral, HS, Alex George, DO, 40 mg at 11/12/24 2138      Lab Results: I have reviewed the following results:  Results from last 7 days   Lab Units 11/12/24  0555 11/11/24  0548 11/08/24  0546   WBC Thousand/uL  --  6.15  --    HEMOGLOBIN g/dL  --  8.7*  --    HEMATOCRIT %  --  25.9*  --    PLATELETS Thousands/uL  --  159  --    POTASSIUM mmol/L 4.5 4.7 4.7   CHLORIDE mmol/L 107 107 108   CO2 mmol/L 23 23 23   BUN mg/dL 38* 43* 36*   CREATININE mg/dL 3.78* 3.91* 3.68*   CALCIUM mg/dL 8.8 8.6 8.8   PHOSPHORUS mg/dL  --   --  4.5       Administrative Statements     Portions of the record may have been created with voice recognition software. Occasional wrong word or \"sound a like\" substitutions may have occurred due to the inherent limitations of voice recognition software. Read the chart carefully and recognize, using context, where substitutions have occurred.If you have any questions, please contact the dictating provider.  "

## 2024-11-13 NOTE — PROGRESS NOTES
11/12/24 1230   Pain Assessment   Pain Assessment Tool 0-10   Pain Score No Pain   Restrictions/Precautions   Precautions Bed/chair alarms;Cognitive;Fall Risk;Supervision on toilet/commode   Upper Body Dressing   Type of Assistance Needed Physical assistance   Physical Assistance Level 25% or less   Comment Pt started donning RUE into shirt.   Upper Body Dressing CARE Score 3   Roll Left and Right   Type of Assistance Needed Incidental touching   Physical Assistance Level No physical assistance   Roll Left and Right CARE Score 4   Sit to Lying   Type of Assistance Needed Physical assistance   Physical Assistance Level 25% or less   Comment Assist to lift RLE into bed   Sit to Lying CARE Score 3   Sit to Stand   Type of Assistance Needed Incidental touching   Physical Assistance Level No physical assistance   Sit to Stand CARE Score 4   Bed-Chair Transfer   Type of Assistance Needed Incidental touching   Physical Assistance Level No physical assistance   Comment CGA stand pivot transfer to L side wiht no AD   Chair/Bed-to-Chair Transfer CARE Score 4   ROM- Right Upper Extremities   R Elbow PROM;Elbow extension;Elbow flexion;Prolonged stretch   R Wrist PROM;Prolonged stretch   R Hand PROM;Thumb;Index finger;Long finger;Ring finger;Little finger;Prolonged stretch   R Weight/Reps/Sets 5 sets 15 reps   RUE ROM Comment PROM during   Splinting   Location R hand   Type resting hand splint   Splinting Comments trialed resting hand splint for positioning of wrist extension PIP/DIP extension to prevent contractor and flexor synergy. Pt tolerated trial of resting hand splint during session. Splint placed after estim for digit and wrist extension. Continue to wear during day when not in therapy and at night.   Cognition   Overall Cognitive Status Impaired   Arousal/Participation Alert;Cooperative   Attention Attends with cues to redirect   Orientation Level Oriented X4   Memory Decreased short term memory   Following Commands  Follows one step commands inconsistently   Activity Tolerance   Activity Tolerance Patient tolerated treatment well   Assessment   Treatment Assessment Pt engaged in 60 minute OT session with focus on neuro-anita of RUE. Completed Segminta Stim program 1: 50 mghz, 10 seconds on 5 seconds off level 35 for digit/wrist extensors and tricep for elbow extension. Stim placed due to flexor tone synergy begining at elbow and wrist. Pt does respond well to stim for extension in conjunction with passive prolonged stretch. Pt noted with tightness in pectoralis. Continue with focus on WB activities with RUE in extension and abudcted. Did discuss with physician considering dynasplint evaluation however she is concerned with patient's compliance and at this time recommends pt follow up outpatient for botox injections and possible splinting of RUE pending pt's follow through with outpatient therapy and appointments. Pt returned to bed at end of session. Continue with POC with focus on estim to wrist/elbow extensors, WB in extended UE position, neuro-anita of RUE, standing balance and dynamic sitting balance.   Prognosis Fair   Problem List Decreased range of motion;Decreased strength;Impaired balance;Decreased mobility;Impaired judgement   Plan   Treatment/Interventions ADL retraining;Functional transfer training;LE strengthening/ROM;Endurance training;Therapeutic exercise;Patient/family training;Gait training;Bed mobility;Compensatory technique education   Progress Progressing toward goals   OT Therapy Minutes   OT Time In 1230   OT Time Out 1330   OT Total Time (minutes) 60   OT Mode of treatment - Individual (minutes) 60   OT Mode of treatment - Concurrent (minutes) 0   OT Mode of treatment - Group (minutes) 0   OT Mode of treatment - Co-treat (minutes) 0   OT Mode of Treatment - Total time(minutes) 60 minutes   OT Cumulative Minutes 1210   Therapy Time missed   Time missed? No

## 2024-11-13 NOTE — ASSESSMENT & PLAN NOTE
Lab Results   Component Value Date    HGBA1C 8.6 (H) 10/16/2024       Recent Labs     11/11/24  1618 11/12/24  0606 11/12/24  1606 11/13/24  0626   POCGLU 94 88 108 103     Most recent hemoglobin A1c of 8.6 and technically uncontrolled although blood sugars in the hospital have been well-controlled  Currently on semaglutide as an outpatient but was on hold due to ROSINA in the hospital  Continue sliding scale and 4 times daily Accu-Cheks and as well as a diabetic diet

## 2024-11-13 NOTE — PROGRESS NOTES
Progress Note - PMR   Name: Tommie Taylor 59 y.o. male I MRN: [id number]  Unit/Bed#: [facility] 261-01 I Date of Admission: 10/26/2024   Date of Service: 11/13/2024 I Hospital Day: 18     Assessment & Plan  CVA (cerebral vascular accident) (Formerly [facility])  Patient with uncontrolled hypertension and diabetes presents with right upper and right lower extremity weakness as well as facial droop  A CT of the head as well as a CTA of the head and neck were completed with negative for acute abnormalities for an acute process  TNK had been administered following the correction of his hypertension after being placed on a Cardene drip  After ministration of the TNK developed worsening dysarthria as well as headache and a repeat CT was still negative.  The 24 post TNK CT was also negative  Neurology followed the patient and MRI was completed and was significant for left corona radiata stroke  Placed on dual antiplatelet therapy for 3 weeks with plans for monotherapy with aspirin and statin indefinitely for secondary stroke prophylaxis (10/15-11/5) started on ASA and statin monotherapy   Recommendation for a Zio patch as an outpatient  Follow-up with neurovascular attending in 6 to 8 weeks  Physical, occupational and speech therapy while on the acute rehabilitation unit  Episode of worsening RLE weakness,CT 11/4 no acute changes.   Bipolar I disorder, severe, current or most recent episode depressed, with psychotic features (Formerly [facility])  History of chronic bipolar 1 disorder and follows with outpatient psychiatry and was seen inpatient  Mood has been stable and is maintained on Zyprexa, bupropion and trazodone as well as Ingrezza for tardive dyskinesia  Follow-up with psychiatry as an outpatient and consider virtual consultation if indicated for any changes while on ARC  GERD (gastroesophageal reflux disease)  Continue Protonix as well as as needed Tums  Insomnia  Continue trazodone and consider sleep logs  Encourage use of CPAP   DAISY (obstructive  sleep apnea)  Continue CPAP with home settings  Ordered and compliant per records  Anemia, unspecified  Hemoglobin most recently of 10.4 which is up from 9.3 but has been hovering in the 10-11 range for most of admission  FOBT: neg x3 10/29,11/1,11/4  Started on venofer per nephrology x3 completed   Continue to monitor with biweekly CBC or sooner if clinically indicated  Hyperlipidemia  Continue Lipitor 40 mg every evening  Class 3 severe obesity due to excess calories with serious comorbidity and body mass index (BMI) of 40.0 to 44.9 in adult ([facility])  Continue with exercise and promote lifestyle modification, weight loss counseling and management of medical conditions to optimize status  Tardive dyskinesia  Continue Ingrezza 40 mg at bedtime  Per wife he gets the medicine from [place], requested that she obtain more as he is running out  Hypertension  Presented to the hospital significantly elevated blood pressure with systolic ranging from 197-231  Was placed on a Cardizem drip initially for hypertensive emergency  Had been on Demadex 20 mg daily but was on hold due to the increasing creatinine  Cw labetalol 300 mg BID , nifedipine 90 mg daily dec to 60 mg daily and hydralazine 25mg Q8h- hydral dec to 10 mg Q8h,   Goals for normotension  Mgmt per IM  Follow-up with nephrology as an outpatient  DM2 (diabetes mellitus, type 2) ([facility])  Lab Results   Component Value Date    HGBA1C 8.6 (H) 10/16/2024       Recent Labs     11/11/24  1618 11/12/24  0606 11/12/24  1606 11/13/24  0626   POCGLU 94 88 108 103     Most recent hemoglobin A1c of 8.6 and technically uncontrolled although blood sugars in the hospital have been well-controlled  Currently on semaglutide as an outpatient but was on hold due to ROSINA in the hospital  Continue sliding scale and 4 times daily Accu-Cheks and as well as a diabetic diet    Acute kidney injury superimposed on stage 4 chronic kidney disease ([facility])  Lab Results   Component Value Date    EGFR  16 11/12/2024    EGFR 15 11/11/2024    EGFR 17 11/08/2024    CREATININE 3.78 (H) 11/12/2024    CREATININE 3.91 (H) 11/11/2024    CREATININE 3.68 (H) 11/08/2024     Recent creatinine of 3.78 11/12 Prior baseline around 2.9-3.0  Etiology felt to be related potentially to ATN in the setting of uncontrolled hypertension and complicated by contrast associated nephropathy  Nephrology was consulted and followed and a UA showed microhematuria and proteinuria.  An ultrasound of the kidney/bladder showed wall thickening but no hydronephrosis  Bladder scans negative for retention  Monitor BMP triweekly or sooner if clinically indicated  Demadex has been on hold and had periodically required IV fluids  Nephrology following; kidney function improved, now around his new baseline, continue to encourage PO   Follow with nephrology as an outpatient or sooner on the ARC if any acute changes  Encephalopathy  Had lethargy on exam back on 10/17 in acute care with improvements however more recently had issues with confusion and decreased consciousness in the mornings that improves throughout the day with unclear etiology  Prior history of cognitive impairments with last MoCA score of 18  CT of the head was stable, B12 level (446) wnl  Was evaluated by psychiatry with no changes in medications recommended  EEG showed no epileptiform activity just moderate nonspecific dysfunction  Will need to monitor especially with change in environment now on the ARC for any changes  Impaired mobility and activities of daily living  Patient was evaluated by the rehabilitation team MD and an appropriate candidate for acute inpatient rehabilitation program at this time.  The patient will tolerate 3 hours/day 5 to 7 days/week of intensive physical, occupational and speech therapy in order to obtain goals for community discharge  Due to the patient's functional Compared to their baseline level of function in addition to their ongoing medical needs, the  patient would benefit from daily supervision from a rehabilitation physician as well as rehabilitation nursing to implement and adjust the medical as well as functional plan of care in order to meet the patient's goals.  This patient was discussed by the Interdisciplinary Team in weekly case conference today. The care of the patient was extensively discussed with all care providers and an appropriate rehabilitation plan was formulated unique for this patient. Barriers were identified preventing progression of therapy and appropriate interventions were discussed with each discipline. Please see the team note for input from all disciplines regarding barriers, intervention, and discharge planning.  DC: 11/18 homecare PT/OT/SLP/RN/HHA  At risk for alteration in bowel function  Constipated no bm for several days; lactulose PRN  Start colase, senna daily and miralax prn  Titrate as needed   Overgrown toenails  Cs podiatry   Vitamin D deficiency  <7 10/7  Mgmt per IM/nephrology   Fall during current hospitalization  Patient with fall to knees at shift change 10/31   Denied any knee pain  Get larger bariatric chair   Fall precautions.   Orthostatic hypotension  Noted to have orthostatic hypotension during therapy   Apply TEDs/Binder if available for patient size.   Fall precautions  Monitor anemia as above   BP medication titration per IM   Spasticity  With increased flexion tone in RUE  Due to patients kidney function would not be a good candidate for baclofen, and with his episodes of hypotension while at rehab would hold off on starting tizanidine.   Safest option to address patients spasticity would be neurotoxin injections given local effect     History of Present Illness   Tommie Taylor is a 59 y.o. male with history of bipolar disorder, CKD stage IV, type 2 diabetes, hypertension, tardive dyskinesia, sleep apnea, GERD who presented to the [facility] on 10/14/2024 for right-sided weakness  and facial droop.  MRI revealed a left corona radiata infarct and neurology was consulted and was administered TNK.  He initially required a Cardene drip for his hypertensive emergency and was eventually placed on dual antiplatelet therapy with plan to continue aspirin and Plavix until that date and continue with aspirin as monotherapy as well as atorvastatin for secondary stroke prophylaxis.  He was recommended for Zio patch placement as an outpatient.  His hospital course was complicated by acute kidney injury on top of his CKD thought to be related to ATN in the setting of uncontrolled blood pressure as well as contrast associated nephropathy.  Nephrology did follow during his course and had some levels of improvement however has not returned back to his baseline level of creatinine.  Additionally there was some levels of encephalopathy which are stable at this time. The patient was evaluated by the Rehabilitation team and deemed an appropriate candidate for comprehensive inpatient rehabilitation and admitted to the [facility] on 10/26/2024  5:13 PM     Rehab Diagnosis: Impairment of mobility, safety, Activities of Daily Living (ADLs), and cognitive/communication skills due to Stroke:  01.2  Right Body Involvement (Left Brain)  Etiologic Dx: Left Corona Radiata Infarct  Date of Onset: 10/14/2024   Date of surgery: N/A  Chief Complaint: f/u ambulatory dysfunction    Interval: Patient seen and examined in therapy. No events overnight.  Reports overall feeling well. Last BM 11/11. Sleeping well at night. Better mood today. Continues to have increased tone in his R arm but has been more mindful of where the arm in at any time Denies any f/c/n/v, CP, SOB, abdominal pain, constipation, or diarrhea.       Objective   Functional Update:  Physical Therapy Occupational Therapy Speech Therapy   Weight Bearing Status: Full Weight Bearing  Transfers: Incidental Touching  Bed Mobility: Incidental Touching  Amulation Distance (ft):  100 feet  Ambulation: Incidental Touching, Minimal Assistance  Assistive Device for Ambulation:  (no device)  Wheelchair Mobility Distance: 50 ft  Wheelchair Mobility: Minimal Assistance  Number of Stairs: 8  Assistive Device for Stairs: Lehft Hand Rail  Stair Assistance: Incidental Touching  Ramp: Minimal Assistance  Discharge Recommendations: Home with:  DC Home with:: Family Support, Home Physical Therapy   Eating: Supervision (set-up)  Grooming: Supervision  Bathing: Moderate Assistance  Bathing: Moderate Assistance  Upper Body Dressing: Minimal Assistance  Lower Body Dressing: Minimal Assistance (footwear mod A)  Toileting: Moderate Assistance (intermittent incontinence of urine, having BM infrequently whihc he reports is baseline)  Tub/Shower Transfer: Minimal Assistance  Toilet Transfer: Minimal Assistance  Cognition: Exceptions to WNL  Cognition: Decreased Memory, Decreased Executive Functions, Decreased Attention, Decreased Safety, Decreased Comprehension  Orientation: Person, Place, Time, Situation   Mode of Communication: Verbal  Speech/Language: Dysarthia  Cognition: Exceptions to WNL  Cognition: Decreased Memory, Decreased Executive Functions, Decreased Attention, Decreased Comprehension  Orientation: Place, Time, Situation, Person  Discharge Recommendations: Home with:  DC Home with:: 24 Hour Supervision, 24 Hour Assisteance, Family Support, Home Speech Therapy, Outpatient Speech Therapy         Temp:  [97.3 °F (36.3 °C)-97.5 °F (36.4 °C)] 97.5 °F (36.4 °C)  HR:  [72-77] 72  Resp:  [18] 18  BP: (131-171)/(75-88) 158/77  SpO2:  [97 %-98 %] 97 %    Physical Exam    Gen: No acute distress, obese   HEENT: Moist mucus membranes, Normocephalic/Atraumatic  Cardiovascular: Regular rate, rhythm,  Heme/Extr: mild bilateral edema   Pulmonary: Non-labored breathing. Lungs CTAB  : No amezquita  GI:  non-distended. BS+  MSK: RUE weakness w/ increased tone MAS 1 elbow flexion,   Integumentary: Skin is warm, dry.  .  Neuro: dysarthric Appropriate to questioning.  Psych: Normal mood and affect.       Scheduled Meds:  Current Facility-Administered Medications   Medication Dose Route Frequency Provider Last Rate    acetaminophen  650 mg Oral Q6H PRN Alex S George, DO      aspirin  81 mg Oral Daily Alex S George, DO      atorvastatin  40 mg Oral QPM Alex S George, DO      buPROPion  100 mg Oral BID Alex S George, DO      calcium carbonate  1,000 mg Oral BID PRN Alex S George, DO      docusate sodium  100 mg Oral BID Cathy Miranda MD      fluticasone  1 spray Each Nare Daily SAMANTHA Kern      heparin (porcine)  7,500 Units Subcutaneous Q8H MIGEL Alex S George, DO      hydrALAZINE  10 mg Oral Q8H MIGEL SAMANTHA Kern      labetalol  300 mg Oral BID Alex S George, DO      lactulose  20 g Oral TID PRN Cathy Miranda MD      loratadine  5 mg Oral Daily SAMANTHA Kern      melatonin  3 mg Oral HS Joe Garcia, SAMANTHA      NIFEdipine  60 mg Oral Daily SAMANTHA Kern      OLANZapine  10 mg Oral HS Alex S George, DO      ondansetron  4 mg Oral Q6H PRN SAMANTHA Kern      pantoprazole  40 mg Oral Early Morning Alex S George, DO      polyethylene glycol  17 g Oral Daily Cathy Miranda MD      senna  2 tablet Oral HS Cathy Miranda MD      traZODone  50 mg Oral HS PRN Alex S George, DO      Valbenazine Tosylate  40 mg Oral HS Alex S George, DO           Lab Results: I have reviewed the following results:  Results from last 7 days   Lab Units 11/11/24  0548   HEMOGLOBIN g/dL 8.7*   HEMATOCRIT % 25.9*   WBC Thousand/uL 6.15   PLATELETS Thousands/uL 159     Results from last 7 days   Lab Units 11/12/24  0555 11/11/24  0548 11/08/24  0546   BUN mg/dL 38* 43* 36*   SODIUM mmol/L 138 137 138   POTASSIUM mmol/L 4.5 4.7 4.7   CHLORIDE mmol/L 107 107 108   CREATININE mg/dL 3.78* 3.91* 3.68*              Cathy Miranda MD   Physical Medicine and Rehabilitation   11/13/24    I have  spent a total time of 45 minutes in caring for this patient on the day of the visit/encounter including Counseling / Coordination of care, Documenting in the medical record, and Communicating with other healthcare professionals .  This patient was discussed by the Interdisciplinary Team in weekly case conference today. The care of the patient was extensively discussed with all care providers and an appropriate rehabilitation plan was formulated unique for this patient. Barriers were identified preventing progression of therapy and appropriate interventions were discussed with each discipline. Please see the team note for input from all disciplines regarding barriers, intervention, and discharge planning.

## 2024-11-13 NOTE — ASSESSMENT & PLAN NOTE
Initially with hypertensive emergency  Currently on labetalol 300 mg p.o. twice daily, nifedipine 60 mg p.o. daily, hydralazine 10 mg p.o. every 8hr  His standing blood pressure is around 120/80, otherwise he has blood pressures in the 1 60-1 70 systolic range, will avoid tight blood pressure control while inpatient and rehab uptitrate hydralazine as needed

## 2024-11-13 NOTE — ASSESSMENT & PLAN NOTE
Lab Results   Component Value Date    HGBA1C 8.6 (H) 10/16/2024   Blood Sugar Average: Last 72 hrs:  (P) 101  Continue on insulin  most recent A1c 8.6% as of October 2024  Advised of tight glycemic control

## 2024-11-13 NOTE — ASSESSMENT & PLAN NOTE
Continue Ingrezza 40 mg at bedtime  Per wife he gets the medicine from [place], requested that she obtain more as he is running out

## 2024-11-13 NOTE — TELEPHONE ENCOUNTER
Pharmacy called to say that they have not been able to get in contact with patient for the last 6 months. They could not fill his Ingrezza because he has no insurance. Writer saw that the patient was currently admitted.

## 2024-11-13 NOTE — CASE MANAGEMENT
CM received message in [employer] from [employer], stating there is a copay for the shower bench, and they have not been able to reach patint or family. CM called wife Beht, left message on voice mail ,there is a cost for the DME, and they are requesting she call them at [phone]. CM also left contact information, for her to return call to  if needed.

## 2024-11-13 NOTE — ASSESSMENT & PLAN NOTE
Lab Results   Component Value Date    HGBA1C 8.6 (H) 10/16/2024       Recent Labs     11/11/24  1618 11/12/24  0606 11/12/24  1606 11/13/24  0626   POCGLU 94 88 108 103       Blood Sugar Average: Last 72 hrs:  (P) 101    Had not been taking anything at home.  Ran out of Ozempic a few months ago.  BG has been well controlled.  Continue cons. carb diet and BID accuchecks.

## 2024-11-13 NOTE — ASSESSMENT & PLAN NOTE
With increased flexion tone in RUE  Due to patients kidney function would not be a good candidate for baclofen, and with his episodes of hypotension while at rehab would hold off on starting tizanidine.   Safest option to address patients spasticity would be neurotoxin injections given local effect

## 2024-11-13 NOTE — PROGRESS NOTES
11/13/24 0901   Pain Assessment   Pain Assessment Tool 0-10   Pain Score No Pain   Subjective   Subjective tired but ready for therapy   Sit to Stand   Type of Assistance Needed Incidental touching   Sit to Stand CARE Score 4   Bed-Chair Transfer   Type of Assistance Needed Incidental touching   Chair/Bed-to-Chair Transfer CARE Score 4   Car Transfer   Type of Assistance Needed Physical assistance;Verbal cues   Physical Assistance Level 25% or less   Comment simulated setup, cueing to slow down for balance   Car Transfer CARE Score 3   Walk 10 Feet   Type of Assistance Needed Incidental touching   Walk 10 Feet CARE Score 4   Walk 50 Feet with Two Turns   Type of Assistance Needed Physical assistance   Physical Assistance Level 25% or less   Comment LOB to right with turns when distracted in halls, Requires therapist assist to correct   Walk 50 Feet with Two Turns CARE Score 3   Walk 150 Feet   Type of Assistance Needed Physical assistance   Physical Assistance Level 25% or less   Walk 150 Feet CARE Score 3   Walking 10 Feet on Uneven Surfaces   Type of Assistance Needed Physical assistance   Physical Assistance Level 25% or less   Comment indoor ramp x 2   Walking 10 Feet on Uneven Surfaces CARE Score 3   Ambulation   Primary Mode of Locomotion Prior to Admission Walk   Distance Walked (feet) 100 ft  (x2, limited by fatogue)   Assist Device   (No Device)   Gait Pattern Inconsistant Lesley;Slow Lesley;Step to;Step through;Decreased L stance;Improper weight shift;R foot drag   Limitations Noted In Balance;Strength;Endurance;Heel Strike   Does the patient walk? 2. Yes   Curb or Single Stair   Style negotiated Single stair   Type of Assistance Needed Incidental touching   Comment R HR   1 Step (Curb) CARE Score 4   4 Steps   Type of Assistance Needed Incidental touching   Comment R HR   4 Steps CARE Score 4   Therapeutic Interventions   Flexibility Gentle prolonged stretching of R UE while pt taking seated rest  "breaks, stretching into Shoulder ER with Abd and FLex to 90, Supination and elbow extension, 2# wrsit wt while walking for distraction due to hypertonicity   Neuromuscular Re-Education R Foot on/off 6\" step, working on wt shift to left, reciprocal gait on stairs, 8 steps x 2   Equipment Use   NuStep 10 min lv 2, all extremities using R hand device and stabilizing wrist for pt throughout   Assessment   Treatment Assessment Skilled Physical Theraoy session focused on walking, neuro re-ed, balance and improving activity tolerance. Pt still with  consistent wt shift to left, resulting in Right foot drag. Balance deficits with poor righting reactions when distracted while walking, especially with turns to the right.   PT Therapy Minutes   PT Time In 0900   PT Time Out 1000   PT Total Time (minutes) 60   PT Mode of treatment - Individual (minutes) 60   PT Mode of treatment - Concurrent (minutes) 0   PT Mode of treatment - Group (minutes) 0   PT Mode of treatment - Co-treat (minutes) 0   PT Mode of Treatment - Total time(minutes) 60 minutes   PT Cumulative Minutes 1495   Therapy Time missed   Time missed? No       "

## 2024-11-14 LAB
ANION GAP SERPL CALCULATED.3IONS-SCNC: 7 MMOL/L (ref 4–13)
BASOPHILS # BLD AUTO: 0.04 THOUSANDS/ΜL (ref 0–0.1)
BASOPHILS NFR BLD AUTO: 1 % (ref 0–1)
BUN SERPL-MCNC: 40 MG/DL (ref 5–25)
CALCIUM SERPL-MCNC: 9.2 MG/DL (ref 8.4–10.2)
CHLORIDE SERPL-SCNC: 105 MMOL/L (ref 96–108)
CO2 SERPL-SCNC: 24 MMOL/L (ref 21–32)
CREAT SERPL-MCNC: 3.83 MG/DL (ref 0.6–1.3)
EOSINOPHIL # BLD AUTO: 0.23 THOUSAND/ΜL (ref 0–0.61)
EOSINOPHIL NFR BLD AUTO: 4 % (ref 0–6)
ERYTHROCYTE [DISTWIDTH] IN BLOOD BY AUTOMATED COUNT: 14.3 % (ref 11.6–15.1)
GFR SERPL CREATININE-BSD FRML MDRD: 16 ML/MIN/1.73SQ M
GLUCOSE P FAST SERPL-MCNC: 109 MG/DL (ref 65–99)
GLUCOSE SERPL-MCNC: 109 MG/DL (ref 65–140)
GLUCOSE SERPL-MCNC: 110 MG/DL (ref 65–140)
GLUCOSE SERPL-MCNC: 95 MG/DL (ref 65–140)
HCT VFR BLD AUTO: 28.4 % (ref 36.5–49.3)
HGB BLD-MCNC: 9.8 G/DL (ref 12–17)
IMM GRANULOCYTES # BLD AUTO: 0.03 THOUSAND/UL (ref 0–0.2)
IMM GRANULOCYTES NFR BLD AUTO: 1 % (ref 0–2)
LYMPHOCYTES # BLD AUTO: 1.5 THOUSANDS/ΜL (ref 0.6–4.47)
LYMPHOCYTES NFR BLD AUTO: 28 % (ref 14–44)
MCH RBC QN AUTO: 27 PG (ref 26.8–34.3)
MCHC RBC AUTO-ENTMCNC: 34.5 G/DL (ref 31.4–37.4)
MCV RBC AUTO: 78 FL (ref 82–98)
MONOCYTES # BLD AUTO: 0.68 THOUSAND/ΜL (ref 0.17–1.22)
MONOCYTES NFR BLD AUTO: 13 % (ref 4–12)
NEUTROPHILS # BLD AUTO: 2.9 THOUSANDS/ΜL (ref 1.85–7.62)
NEUTS SEG NFR BLD AUTO: 53 % (ref 43–75)
NRBC BLD AUTO-RTO: 0 /100 WBCS
PLATELET # BLD AUTO: 184 THOUSANDS/UL (ref 149–390)
PMV BLD AUTO: 10 FL (ref 8.9–12.7)
POTASSIUM SERPL-SCNC: 4.7 MMOL/L (ref 3.5–5.3)
RBC # BLD AUTO: 3.63 MILLION/UL (ref 3.88–5.62)
SODIUM SERPL-SCNC: 136 MMOL/L (ref 135–147)
WBC # BLD AUTO: 5.38 THOUSAND/UL (ref 4.31–10.16)

## 2024-11-14 PROCEDURE — 97130 THER IVNTJ EA ADDL 15 MIN: CPT

## 2024-11-14 PROCEDURE — 94660 CPAP INITIATION&MGMT: CPT

## 2024-11-14 PROCEDURE — 80048 BASIC METABOLIC PNL TOTAL CA: CPT | Performed by: NURSE PRACTITIONER

## 2024-11-14 PROCEDURE — 99232 SBSQ HOSP IP/OBS MODERATE 35: CPT | Performed by: INTERNAL MEDICINE

## 2024-11-14 PROCEDURE — 97129 THER IVNTJ 1ST 15 MIN: CPT

## 2024-11-14 PROCEDURE — 97530 THERAPEUTIC ACTIVITIES: CPT

## 2024-11-14 PROCEDURE — 94760 N-INVAS EAR/PLS OXIMETRY 1: CPT

## 2024-11-14 PROCEDURE — 97110 THERAPEUTIC EXERCISES: CPT

## 2024-11-14 PROCEDURE — 94003 VENT MGMT INPAT SUBQ DAY: CPT

## 2024-11-14 PROCEDURE — 97112 NEUROMUSCULAR REEDUCATION: CPT

## 2024-11-14 PROCEDURE — 97535 SELF CARE MNGMENT TRAINING: CPT

## 2024-11-14 PROCEDURE — 99232 SBSQ HOSP IP/OBS MODERATE 35: CPT | Performed by: FAMILY MEDICINE

## 2024-11-14 PROCEDURE — 97116 GAIT TRAINING THERAPY: CPT

## 2024-11-14 PROCEDURE — 82948 REAGENT STRIP/BLOOD GLUCOSE: CPT

## 2024-11-14 PROCEDURE — 85025 COMPLETE CBC W/AUTO DIFF WBC: CPT | Performed by: NURSE PRACTITIONER

## 2024-11-14 RX ADMIN — DOCUSATE SODIUM 100 MG: 100 CAPSULE, LIQUID FILLED ORAL at 17:05

## 2024-11-14 RX ADMIN — VALBENAZINE 40 MG: 40 CAPSULE ORAL at 21:24

## 2024-11-14 RX ADMIN — PANTOPRAZOLE SODIUM 40 MG: 40 TABLET, DELAYED RELEASE ORAL at 05:35

## 2024-11-14 RX ADMIN — MELATONIN TAB 3 MG 3 MG: 3 TAB at 21:21

## 2024-11-14 RX ADMIN — DOCUSATE SODIUM 100 MG: 100 CAPSULE, LIQUID FILLED ORAL at 08:19

## 2024-11-14 RX ADMIN — HEPARIN SODIUM 7500 UNITS: 5000 INJECTION INTRAVENOUS; SUBCUTANEOUS at 13:00

## 2024-11-14 RX ADMIN — BUPROPION HYDROCHLORIDE 100 MG: 100 TABLET, FILM COATED ORAL at 08:18

## 2024-11-14 RX ADMIN — HEPARIN SODIUM 7500 UNITS: 5000 INJECTION INTRAVENOUS; SUBCUTANEOUS at 21:19

## 2024-11-14 RX ADMIN — LABETALOL HYDROCHLORIDE 300 MG: 100 TABLET, FILM COATED ORAL at 08:18

## 2024-11-14 RX ADMIN — FLUTICASONE PROPIONATE 1 SPRAY: 50 SPRAY, METERED NASAL at 08:19

## 2024-11-14 RX ADMIN — LABETALOL HYDROCHLORIDE 300 MG: 100 TABLET, FILM COATED ORAL at 21:19

## 2024-11-14 RX ADMIN — HYDRALAZINE HYDROCHLORIDE 10 MG: 10 TABLET ORAL at 21:20

## 2024-11-14 RX ADMIN — NIFEDIPINE 60 MG: 30 TABLET, FILM COATED, EXTENDED RELEASE ORAL at 08:18

## 2024-11-14 RX ADMIN — HEPARIN SODIUM 7500 UNITS: 5000 INJECTION INTRAVENOUS; SUBCUTANEOUS at 05:35

## 2024-11-14 RX ADMIN — POLYETHYLENE GLYCOL 3350 17 G: 17 POWDER, FOR SOLUTION ORAL at 08:18

## 2024-11-14 RX ADMIN — HYDRALAZINE HYDROCHLORIDE 10 MG: 10 TABLET ORAL at 05:35

## 2024-11-14 RX ADMIN — OLANZAPINE 10 MG: 2.5 TABLET, FILM COATED ORAL at 21:19

## 2024-11-14 RX ADMIN — SENNOSIDES 17.2 MG: 8.6 TABLET, FILM COATED ORAL at 21:19

## 2024-11-14 RX ADMIN — ATORVASTATIN CALCIUM 40 MG: 40 TABLET, FILM COATED ORAL at 17:05

## 2024-11-14 RX ADMIN — LORATADINE 5 MG: 10 TABLET ORAL at 08:18

## 2024-11-14 RX ADMIN — ASPIRIN 81 MG: 81 TABLET, COATED ORAL at 08:19

## 2024-11-14 RX ADMIN — BUPROPION HYDROCHLORIDE 100 MG: 100 TABLET, FILM COATED ORAL at 17:05

## 2024-11-14 RX ADMIN — HYDRALAZINE HYDROCHLORIDE 10 MG: 10 TABLET ORAL at 13:00

## 2024-11-14 NOTE — ASSESSMENT & PLAN NOTE
Not on phosphorus binders  Phosphorus has improved to 4.5 on 11/8/2024  Vit D deficiency-vitamin D levels less than 7 on 10/7/2024-previously on vitamin D 50,000 units weekly  Will check phosphorus and vitamin D along with PTH in a.m.-reviewed further recommendations will be forthcoming

## 2024-11-14 NOTE — PROGRESS NOTES
11/14/24 1100   Sit to Stand   Type of Assistance Needed Incidental touching   Comment attempted CS but pt at times falls back into chair   Sit to Stand CARE Score 4   Bed-Chair Transfer   Type of Assistance Needed Incidental touching   Comment CG / CS no device   Chair/Bed-to-Chair Transfer CARE Score 4   Walk 10 Feet   Type of Assistance Needed Supervision   Comment CS no device   Walk 10 Feet CARE Score 4   Walk 50 Feet with Two Turns   Type of Assistance Needed Incidental touching   Comment CG no device   Walk 50 Feet with Two Turns CARE Score 4   Ambulation   Primary Mode of Locomotion Prior to Admission Walk   Distance Walked (feet) 100 ft  (x2)   Provided Assistance with: Balance   Walk Assist Level Close Supervision;Contact Guard   Findings CS but 1 time scuffed and appeared going forward when pt was at NSG station and looking around being distracted so therapist grabbed belt   Does the patient walk? 2. Yes   4 Steps   Type of Assistance Needed Incidental touching   Comment CG with single rail using reciprocal pattern - still catches R heel going down and slight LOB but pt self corrected using UE support   4 Steps CARE Score 4   12 Steps   Type of Assistance Needed Physical assistance   Physical Assistance Level 25% or less   Comment CG with single rail using reciprocal pattern - still catches R heel going down and slight LOB but pt self corrected using UE support   12 Steps CARE Score 3   Equipment Use   NuStep 10 mins L 4 SPM 55  used R hand guide   Assessment   Treatment Assessment 30 min session focused on Neuro prime on Nustep with inc resistance to fully challenge,  and performed steps.  Pt still having some R foot catch mostly if distracted when amb in hallways.  Cont to reach full supervision level.   Barriers to Discharge Decreased caregiver support  (has ramp to enter)   PT Barriers   Physical Impairment Decreased strength;Decreased endurance;Impaired balance;Decreased mobility;Decreased  cognition;Decreased safety awareness   Plan   Progress Progressing toward goals   PT Therapy Minutes   PT Time In 1100   PT Time Out 1130   PT Total Time (minutes) 30   PT Mode of treatment - Individual (minutes) 30   PT Mode of treatment - Concurrent (minutes) 0   PT Mode of treatment - Group (minutes) 0   PT Mode of treatment - Co-treat (minutes) 0   PT Mode of Treatment - Total time(minutes) 30 minutes   PT Cumulative Minutes 1525   Therapy Time missed   Time missed? No

## 2024-11-14 NOTE — PROGRESS NOTES
Progress Note - Internal Medicine   Name: Tommie Taylor 59 y.o. male I MRN: [id number]  Unit/Bed#: -01 I Date of Admission: 10/26/2024   Date of Service: 11/14/2024 I Hospital Day: 19    Assessment & Plan  CVA (cerebral vascular accident) (HCC)  Initially presented with R sided weakness and R facial droop.   CTH and CTA negative for acute process.   MRI showed left corona radiata stroke.   Received TNK.    Completed 3 weeks of DAPT on 11/6.  Continue with ASA 81mg daily.  Continue statin.    Neurovascular checks Q shift.  Maintain normotension.    Follow up with Neurovascular in 4-6 weeks as outpatient.  Recommending Zio patch/Loop.     PT/OT/ST per primary team.     Complaints of worsening RLE weakness on 11/4 after therapies - CTH stable.  Noted to have orthostasis - recommend obtaining higher BP to prevent symptoms.  Bipolar I disorder, severe, current or most recent episode depressed, with psychotic features ([facility])  Currently on bupropion 100mg BID and olanzapine 10mg at HS with valbenazine tosylate 40mg for tardive dyskinesia per home regimen. Follow up outpatient with psychiatry.   GERD (gastroesophageal reflux disease)  EGD with GI 10/14, directed to continue with Protonix. Tums available PRN for dyspepsia.   Insomnia  Educate and encourage on sleep hygiene. Continue Trazodone at HS for sleep.   DAISY (obstructive sleep apnea)  Continue use of CPAP with home settings.  Does not use CPAP at home - currently broken.  Recommend overnight oximetry prior to discharge.   Follow-up with Sleep Medicine as outpatient.  Anemia, unspecified  Hgb currently 9.8.  Hemoglobin 12.1 on admission.   Most recent iron panel on 10/15: Iron Sat 38%, TIBC 204, Iron 77, and Ferritin 111.  No need for iron supplementation at this time.  FOBT 2 out of 3 negative.  3rd to be obtained.  Completed 3 doses of IV Venofer per Nephrology recs.  Continue to trend routine CBC.  Hyperlipidemia  Most recent lipid panel 10/16/24:  Cholesterol 154, triglycerides 120, HDL 40, LDL 90.  Continue Lipitor 40mg daily, increased from home 20mg daily.  Class 3 severe obesity due to excess calories with serious comorbidity and body mass index (BMI) of 40.0 to 44.9 in adult ([facility])  BMI 42.74 on admission. Encourage lifestyle modifications and provide support for nutritional teaching. Consult placed to nutrition services during acute course.   Tardive dyskinesia  Continue on home treatment Valbenazine Tosylate 40mg HS.   Hypertension  Presented with hypertensive emergency on initial hospitalization.   Concerns about medication non-compliance.  Home regimen: labetalol 100mg BID and amlodipine 10mg daily.  Currently on labetalol 300mg BID, Nifedipine 60mg daily, and hydralazine 10mg Q8 hours.  Nephrology following.   DM2 (diabetes mellitus, type 2) ([facility])  Lab Results   Component Value Date    HGBA1C 8.6 (H) 10/16/2024       Recent Labs     11/12/24  1606 11/13/24  0626 11/13/24  1635 11/14/24  0542   POCGLU 108 103 109 110       Blood Sugar Average: Last 72 hrs:  (P) 100.9508902469978266    Had not been taking anything at home.  Ran out of Ozempic a few months ago.  BG has been well controlled.  Continue cons. carb diet and BID accuchecks.    Acute kidney injury superimposed on stage 4 chronic kidney disease ([facility])  Lab Results   Component Value Date    EGFR 16 11/14/2024    EGFR 16 11/12/2024    EGFR 15 11/11/2024    CREATININE 3.83 (H) 11/14/2024    CREATININE 3.78 (H) 11/12/2024    CREATININE 3.91 (H) 11/11/2024     Creatinine currently 3.83 from 3.78.  Baseline creatinine 2.5-2.9.    Avoid nephrotoxins.  Home diuretics currently on hold.  Encourage hydration.  Renal ultrasound demonstrates no hydro but mild bladder wall thickening.   Nephrology following.  Labs MWF per Nephro.  Follows with Dr. No (Nephrology) as outpatient.  Encephalopathy  Lethargy and encephalopathic symptoms noted during acute course.  B12 and head CT WNL. No subsequent episodes  noted.   EEG on 10/22 consistent with moderate nonspecific cerebral dysfunction, no seizure activity.  Continue to monitor behavior and symptoms for signs of changes.   Most recent MoCA was 18/30 in 2024.  Per Psych - if continues to have delirium consider discontinuing Wellbutrin.  Concerns DAISY could also be contributing to encephalopathy per acute care.  Vitamin D deficiency  Vitamin D level <7 on 10/7.  Continue home vitamin D 50,000u weekly.  Continue to follow-up with Nephrology as outpatient.  Orthostatic hypotension  Orthostatic + on .  Continue to monitor orthostatics.  Apply abdominal binder/TEDs as needed.  Encourage PO hydration.    VTE Pharmacologic Prophylaxis:   Pharmacologic: Heparin  Mechanical VTE Prophylaxis in Place: Yes - sequential compression devices.    Current Length of Stay: 19 day(s)    Current Patient Status: Inpatient Rehab     Discharge Plan: As per primary team.    Code Status: Level 1 - Full Code    Subjective:   Pt examined while pt sitting in recliner in pt room.  Currently has no complaints.  States that therapy went well.  Denies any headaches, lightheadedness, dizziness, SOB, or palpitations.  Had a BM this morning without any issues.  Plans for discharge on Monday and feels ready to go home.    Objective:     Vitals:   Temp (24hrs), Av.9 °F (35.5 °C), Min:95.5 °F (35.3 °C), Max:96.3 °F (35.7 °C)    Temp:  [95.5 °F (35.3 °C)-96.3 °F (35.7 °C)] 96.3 °F (35.7 °C)  HR:  [67-80] 80  Resp:  [18] 18  BP: (120-177)/(65-89) 120/65  SpO2:  [98 %-99 %] 99 %  Body mass index is 42.74 kg/m².     Review of Systems   Constitutional:  Negative for appetite change, chills, fatigue and fever.   HENT:  Negative for trouble swallowing.    Eyes:  Negative for visual disturbance.   Respiratory:  Negative for cough, shortness of breath, wheezing and stridor.    Cardiovascular:  Negative for chest pain, palpitations and leg swelling.   Gastrointestinal:  Negative for abdominal distention,  abdominal pain, constipation, diarrhea, nausea and vomiting.        LBM 11/14   Genitourinary:  Negative for difficulty urinating.   Musculoskeletal:  Positive for gait problem. Negative for arthralgias and back pain.   Neurological:  Positive for weakness (RUE weakness from stroke, gradually improving). Negative for dizziness, light-headedness, numbness and headaches.   Psychiatric/Behavioral:  Negative for dysphoric mood and sleep disturbance. The patient is not nervous/anxious.    All other systems reviewed and are negative.       Input and Output Summary (last 24 hours):       Intake/Output Summary (Last 24 hours) at 11/14/2024 0944  Last data filed at 11/14/2024 0533  Gross per 24 hour   Intake 120 ml   Output 680 ml   Net -560 ml       Physical Exam:     Physical Exam  Vitals and nursing note reviewed.   Constitutional:       General: He is not in acute distress.     Appearance: Normal appearance. He is obese. He is not ill-appearing.   HENT:      Head: Normocephalic and atraumatic.   Cardiovascular:      Rate and Rhythm: Normal rate and regular rhythm.      Pulses: Normal pulses.      Heart sounds: Normal heart sounds. No murmur heard.     No friction rub.   Pulmonary:      Effort: Pulmonary effort is normal. No respiratory distress.      Breath sounds: Normal breath sounds. No wheezing or rhonchi.   Abdominal:      General: Abdomen is flat. Bowel sounds are normal. There is no distension.      Palpations: Abdomen is soft. There is no mass.      Tenderness: There is no abdominal tenderness. There is no guarding or rebound.      Hernia: No hernia is present.   Musculoskeletal:      Cervical back: Normal range of motion and neck supple. No tenderness.      Right lower leg: Edema (Minimal non-pitting edema) present.      Left lower leg: Edema (Moderate non-pitting edema) present.   Skin:     General: Skin is warm and dry.      Capillary Refill: Capillary refill takes less than 2 seconds.   Neurological:       Mental Status: He is alert and oriented to person, place, and time.      Cranial Nerves: Dysarthria and facial asymmetry present.      Motor: Weakness (RUE weakness) present.   Psychiatric:         Mood and Affect: Mood normal.         Behavior: Behavior normal.         Additional Data:     Labs:    Results from last 7 days   Lab Units 11/14/24  0530   WBC Thousand/uL 5.38   HEMOGLOBIN g/dL 9.8*   HEMATOCRIT % 28.4*   PLATELETS Thousands/uL 184   SEGS PCT % 53   LYMPHO PCT % 28   MONO PCT % 13*   EOS PCT % 4     Results from last 7 days   Lab Units 11/14/24  0530   SODIUM mmol/L 136   POTASSIUM mmol/L 4.7   CHLORIDE mmol/L 105   CO2 mmol/L 24   BUN mg/dL 40*   CREATININE mg/dL 3.83*   ANION GAP mmol/L 7   CALCIUM mg/dL 9.2   GLUCOSE RANDOM mg/dL 109         Results from last 7 days   Lab Units 11/14/24  0542 11/13/24  1635 11/13/24  0626 11/12/24  1606 11/12/24  0606 11/11/24  1618 11/11/24  0633 11/10/24  2103 11/10/24  1620 11/10/24  0609 11/09/24  1558 11/09/24  0556   POC GLUCOSE mg/dl 110 109 103 108 88 94 93 111 106 105 97 131               Labs reviewed    Imaging:    Imaging reviewed    Recent Cultures (last 7 days):           Last 24 Hours Medication List:   Current Facility-Administered Medications   Medication Dose Route Frequency Provider Last Rate    acetaminophen  650 mg Oral Q6H PRN Alex S George, DO      aspirin  81 mg Oral Daily Alex S George, DO      atorvastatin  40 mg Oral QPM Alex S George, DO      buPROPion  100 mg Oral BID Alex S George, DO      calcium carbonate  1,000 mg Oral BID PRN Alex S George, DO      docusate sodium  100 mg Oral BID Cathy Miranda MD      fluticasone  1 spray Each Nare Daily SAMANTHA Kern      heparin (porcine)  7,500 Units Subcutaneous Q8H MIGEL Alex S George, DO      hydrALAZINE  10 mg Oral Q8H [facility] SAMANTHA Kern      labetalol  300 mg Oral BID Alex S George, DO      lactulose  20 g Oral TID PRN Cathy Miranda MD      loratadine  5 mg Oral  Daily SAMANTHA Kern      melatonin  3 mg Oral HS SAMANTHA Kern      NIFEdipine  60 mg Oral Daily SAMANTHA Kern      OLANZapine  10 mg Oral HS Alex S Ariel, DO      ondansetron  4 mg Oral Q6H PRN SAMANTHA Kern      pantoprazole  40 mg Oral Early Morning Alex S Ariel, DO      polyethylene glycol  17 g Oral Daily Cathy Miranda MD      senna  2 tablet Oral HS Cathy Miranda MD      traZODone  50 mg Oral HS PRN Alex S Ariel, DO      Valbenazine Tosylate  40 mg Oral HS Alex S Ariel, DO          M*Modal software was used to dictate this note.  It may contain errors with dictating incorrect words or incorrect spelling. Please contact the provider directly with any questions.

## 2024-11-14 NOTE — PROGRESS NOTES
11/13/24 1245   Pain Assessment   Pain Assessment Tool 0-10   Pain Score No Pain   Restrictions/Precautions   Precautions Bed/chair alarms;Fall Risk;Cognitive;Supervision on toilet/commode   Roll Left and Right   Type of Assistance Needed Incidental touching   Physical Assistance Level No physical assistance   Comment CGA for safety   Roll Left and Right CARE Score 4   Sit to Lying   Type of Assistance Needed Physical assistance   Physical Assistance Level 25% or less   Comment Assist to lift RLE into bed.   Sit to Lying CARE Score 3   Sit to Stand   Type of Assistance Needed Incidental touching   Physical Assistance Level No physical assistance   Comment no AD   Sit to Stand CARE Score 4   Bed-Chair Transfer   Type of Assistance Needed Physical assistance   Physical Assistance Level 25% or less   Comment Pt requires Min A/CGA for stand pivot transfers with no device. Occasional min A as pt fatigues and attempts to transfer to R side.   Chair/Bed-to-Chair Transfer CARE Score 3   Toileting Hygiene   Type of Assistance Needed Physical assistance   Physical Assistance Level 26%-50%   Comment Pt able to pull pants down and hold urinal for use. Pt does require assist to pull pants up over Right hip.   Toileting Hygiene CARE Score 3   Functional Standing Tolerance   Time 6 min, 8 min, 10 minutes   Activity Pt standing at [facility] to engage in activities to use RUE against gravity. Pt able to maintian balance with b/l hand release and minor lateral weight shifts. Pt with no LOB noted.   ROM- Right Upper Extremities   R Shoulder AAROM;Flexion;Extension   R Elbow AAROM;Elbow extension   R Wrist PROM;Wrist flexion;Wrist extension   R Hand PROM;Thumb;Index finger;Long finger;Ring finger;Little finger   RUE ROM Comment Pt noted with improved initiation of proximal muscles of shoulder and elbow. Pt noted with active shoudler flexion approximately 10 degrees, elbow extension 1/2 range. Pt noted with difficulty achieving full  extension actively. Pt with abduction less than 1/4 range, shoudler elevation 1/2 range, and emerging protraction/retraction.   Neuromuscular Education   Weight Bearing Technique Yes   RUE Weight Bearing Extended arm standing   Comments Pt responded well to AM prolonged stretch to loosen bicep and pectoralis hypertonicity/tightness. Pt able to tolerate extended RUE on tabletop for weight bearing. Pt with improved positioning of RUE in aduction and extension.   Cognition   Overall Cognitive Status Impaired   Arousal/Participation Alert;Cooperative   Attention Attends with cues to redirect   Orientation Level Oriented X4   Memory Decreased short term memory   Additional Activities   Additional Activities Comments tabletop activities with focus on shoulder flexion, abduction, and horizontal adduction to hit targets on tabletop. Pt higly motivated and engaged in activty. Additional activities with focus on elbow extension and shoudler exentension/internal rotation for simluation pull pants up/down on right side/behind back. Reptitive task training compelted for all actiivties 60 reps for target task and 30 reps for simulated pants management.   Activity Tolerance   Activity Tolerance Patient tolerated treatment well   Assessment   Treatment Assessment Pt engaged in 60 minute OT sesison with focus on neuro-anita of RUE. Pt noted with improvement in RUE resitng position since AM prolonged stretch of elbow and shoudler. Pt noted with resting position of elbow in exntesion and shoulder slightly abducted. DUring PM sesison pt not positioned in flexor synergy pattern. Pt able to achieve AROM of shoulder abduction, shoulder flexion, internal rotation, shoulder elevation and emerging protraction/retraction against gravity. Pt able to complete 5 reps 10 sets of each movement. Pt with no AROM of supination or wrist extension today. Pt with trace digit extension in digit II.  Pt highly benefits from heat pack and prolonged stretch  "of pectoralis major, biceps. to decrease flexor synergy tone and promote optimal position in extension to allow AROM  of RUE. Recommend 20 minutes of heat pack with prolonged stretch prior to begining any AROM, neuromuscular re-education and weight bearing activities. Pt highly motivated during today's sesison and reports \"I cant believe I can move my arm this much\". Pt reports that he likes wearing resting hand splint for R hand at night. Pt may benefit from Fugyl Hernández Assessment to identify progress of use of RUE since admission.   Prognosis Fair   Problem List Decreased strength;Decreased range of motion;Decreased endurance;Impaired balance;Decreased mobility;Decreased safety awareness   Plan   Treatment/Interventions Functional transfer training;ADL retraining;Endurance training;Therapeutic exercise;Gait training   Progress Progressing toward goals   OT Therapy Minutes   OT Time In 1245   OT Time Out 1345   OT Total Time (minutes) 60   OT Mode of treatment - Individual (minutes) 60   OT Mode of treatment - Concurrent (minutes) 0   OT Mode of treatment - Group (minutes) 0   OT Mode of treatment - Co-treat (minutes) 0   OT Mode of Treatment - Total time(minutes) 60 minutes   OT Cumulative Minutes 1270   Therapy Time missed   Time missed? No       "

## 2024-11-14 NOTE — ASSESSMENT & PLAN NOTE
Initially with hypertensive emergency improved  Current blood pressure can fluctuate between 150s to 170s  Currently on : labetalol 300 mg p.o. twice daily, nifedipine 60 mg p.o. daily, hydralazine 10 mg p.o. every 8hr  His standing blood pressure is around 120/80, otherwise he has blood pressures in the 1 60-1 70 systolic range, will avoid tight blood pressure control while inpatient and rehab uptitrate hydralazine as needed  Will check orthostatics every shift x 3 higher to further blood pressure medication changes to avoid hypotension

## 2024-11-14 NOTE — PROGRESS NOTES
11/14/24 8458   Pain Assessment   Pain Assessment Tool 0-10   Pain Score No Pain   Patient's Stated Pain Goal No pain   Restrictions/Precautions   Precautions Bed/chair alarms;Cognitive;Fall Risk;Supervision on toilet/commode   Weight Bearing Restrictions No   ROM Restrictions No   Braces or Orthoses Other (Comment)  (compression stockings)   Lifestyle   Autonomy Pt not motivated to complete selfcare but did participate in all tasks asked of him other than request to sit to complete oral care rather than standing.   Reciprocal Relationships family   Service to Others Retired working in [employer]   [employer]   Oral Hygiene   Type of Assistance Needed Set-up / clean-up   Physical Assistance Level No physical assistance   Comment Pt completed seated at his request with set-up A- still not carrying over to place toothpaste into his mouth instead of trying to place it on the brush. He tips over the brush any loses the paste every time.   Oral Hygiene CARE Score 5   Shower/Bathe Self   Type of Assistance Needed Physical assistance   Physical Assistance Level 25% or less   Comment assist for right buttocks, left axilla and upper left arm, seated on transfer tub bench with [facility], GB for standing portions.   Shower/Bathe Self CARE Score 3   Tub/Shower Transfer   Findings CGA walk-in shower with simulated lip at threshold.   Upper Body Dressing   Type of Assistance Needed Physical assistance   Physical Assistance Level 25% or less   Comment assist to set-up the shirt for RUE threading and to pull shirt over right arm, otherwise Pt compelted the task himself   Upper Body Dressing CARE Score 3   Lower Body Dressing   Type of Assistance Needed Incidental touching   Physical Assistance Level No physical assistance   Comment Pt declined underwear today, able to thread bilateral LEs and CGA in stance to hike with VC's to cross midline with left hand. The hike on right hip was difficult and Pt reports he would not  struggle with in, he would ask his wife to do it for him.   Lower Body Dressing CARE Score 4   Putting On/Taking Off Footwear   Type of Assistance Needed Physical assistance   Physical Assistance Level 26%-50%   Comment TA to don TEDs, supervision doffing slipper socks. OT provided elastic shoe laces in Pt's sneakers and with practice Pt able to don/doff with CGA.   Putting On/Taking Off Footwear CARE Score 3   Sit to Stand   Type of Assistance Needed Incidental touching   Physical Assistance Level No physical assistance   Comment no AD   Sit to Stand CARE Score 4   Bed-Chair Transfer   Type of Assistance Needed Incidental touching   Physical Assistance Level No physical assistance   Comment CGA ambulation in room without AD   Chair/Bed-to-Chair Transfer CARE Score 4   Toileting Hygiene   Type of Assistance Needed Incidental touching   Physical Assistance Level No physical assistance   Comment Pt stood to complete posterior hygiene following BM, no clothing mgmt due to Pt had just exited the shower when realized he needed to toilet.   Toileting Hygiene CARE Score 4   Toilet Transfer   Type of Assistance Needed Incidental touching   Physical Assistance Level No physical assistance   Comment Pt utilized the standard toilet in the bathroom with left GB, CGA transfer on/off   Toilet Transfer CARE Score 4   ROM- Right Upper Extremities   R Shoulder AAROM   R Elbow AAROM   Equipment Cuff weight  (4# wrist cuff seated in recliner to facilitate elbow extension and shoulder in neutral while Pt at rest and OT rethreaded the shoes with elastic laces.)   Activity Tolerance   Activity Tolerance Patient tolerated treatment well   Assessment   Treatment Assessment Pt particiapted in 90min OT session focused on selfcare performance. See above for details. Pt demonstrating stron tone through UE today causing shoulder adduction and elbow flexion, needing multiple cues to attend to the arm position and assist it into a more neutral  "position. Pt continues to struggle with carryover of one handed techniques for performance of tasks and defaults to \"my wife will help me.\" Continue to focus on tone reduction, tolerance of resting hand splint for eventual wear over night, dynamic and standing standing balance for unilateral UE tasks without UE support in stance.   Plan   Treatment/Interventions ADL retraining;Functional transfer training;Endurance training  (NMR, estim, splint)   OT Therapy Minutes   OT Time In 0830   OT Time Out 1000   OT Total Time (minutes) 90   OT Mode of treatment - Individual (minutes) 90   OT Mode of treatment - Concurrent (minutes) 0   OT Mode of treatment - Group (minutes) 0   OT Mode of treatment - Co-treat (minutes) 0   OT Mode of Treatment - Total time(minutes) 90 minutes   OT Cumulative Minutes 1390   Therapy Time missed   Time missed? No     "

## 2024-11-14 NOTE — ASSESSMENT & PLAN NOTE
Lab Results   Component Value Date    HGBA1C 8.6 (H) 10/16/2024       Recent Labs     11/12/24  1606 11/13/24  0626 11/13/24  1635 11/14/24  0542   POCGLU 108 103 109 110     Most recent hemoglobin A1c of 8.6 and technically uncontrolled although blood sugars in the hospital have been well-controlled  Currently on semaglutide as an outpatient but was on hold due to ROSINA in the hospital  Continue sliding scale and 4 times daily Accu-Cheks and as well as a diabetic diet

## 2024-11-14 NOTE — PROGRESS NOTES
Progress Note - PMR   Name: Tommie Taylor 59 y.o. male I MRN: [id number]  Unit/Bed#: [facility] 261-01 I Date of Admission: 10/26/2024   Date of Service: 11/14/2024 I Hospital Day: 19     Assessment & Plan  CVA (cerebral vascular accident) ([facility])  Patient with uncontrolled hypertension and diabetes presents with right upper and right lower extremity weakness as well as facial droop  A CT of the head as well as a CTA of the head and neck were completed with negative for acute abnormalities for an acute process  TNK had been administered following the correction of his hypertension after being placed on a Cardene drip  After ministration of the TNK developed worsening dysarthria as well as headache and a repeat CT was still negative.  The 24 post TNK CT was also negative  Neurology followed the patient and MRI was completed and was significant for left corona radiata stroke  Placed on dual antiplatelet therapy for 3 weeks with plans for monotherapy with aspirin and statin indefinitely for secondary stroke prophylaxis (10/15-11/5) started on ASA and statin monotherapy   Recommendation for a Zio patch as an outpatient  Follow-up with neurovascular attending in 6 to 8 weeks  Physical, occupational and speech therapy while on the acute rehabilitation unit  Episode of worsening RLE weakness,CTH 11/4 no acute changes.   Bipolar I disorder, severe, current or most recent episode depressed, with psychotic features ([facility])  History of chronic bipolar 1 disorder and follows with outpatient psychiatry and was seen inpatient  Mood has been stable and is maintained on Zyprexa, bupropion and trazodone as well as Ingrezza for tardive dyskinesia  Follow-up with psychiatry as an outpatient and consider virtual consultation if indicated for any changes while on ARC  GERD (gastroesophageal reflux disease)  Continue Protonix as well as as needed Tums  Insomnia  Continue trazodone and consider sleep logs  Encourage use of CPAP   DAISY (obstructive  sleep apnea)  Continue CPAP with home settings  Ordered and compliant per records  Anemia, unspecified  Hemoglobin most recently of 10.4 which is up from 9.3 but has been hovering in the 10-11 range for most of admission  FOBT: neg x3 10/29,11/1,11/4  Started on venofer per nephrology x3 completed   Continue to monitor with biweekly CBC or sooner if clinically indicated  Hyperlipidemia  Continue Lipitor 40 mg every evening  Class 3 severe obesity due to excess calories with serious comorbidity and body mass index (BMI) of 40.0 to 44.9 in adult ([facility])  Continue with exercise and promote lifestyle modification, weight loss counseling and management of medical conditions to optimize status  Tardive dyskinesia  Continue Ingrezza 40 mg at bedtime  Per wife he gets the medicine from [place], requested that she obtain more as he is running out  Hypertension  Presented to the hospital significantly elevated blood pressure with systolic ranging from 197-231  Was placed on a Cardizem drip initially for hypertensive emergency  Had been on Demadex 20 mg daily but was on hold due to the increasing creatinine  Cw labetalol 300 mg BID , nifedipine 90 mg daily dec to 60 mg daily and hydralazine 25mg Q8h- hydral dec to 10 mg Q8h,   Goals for normotension  Mgmt per IM  Follow-up with nephrology as an outpatient  DM2 (diabetes mellitus, type 2) ([facility])  Lab Results   Component Value Date    HGBA1C 8.6 (H) 10/16/2024       Recent Labs     11/12/24  1606 11/13/24  0626 11/13/24  1635 11/14/24  0542   POCGLU 108 103 109 110     Most recent hemoglobin A1c of 8.6 and technically uncontrolled although blood sugars in the hospital have been well-controlled  Currently on semaglutide as an outpatient but was on hold due to ROSINA in the hospital  Continue sliding scale and 4 times daily Accu-Cheks and as well as a diabetic diet    Acute kidney injury superimposed on stage 4 chronic kidney disease ([facility])  Lab Results   Component Value Date     EGFR 16 11/14/2024    EGFR 16 11/12/2024    EGFR 15 11/11/2024    CREATININE 3.83 (H) 11/14/2024    CREATININE 3.78 (H) 11/12/2024    CREATININE 3.91 (H) 11/11/2024     Recent creatinine of 3.83 11/14 Prior baseline around 2.9-3.0  Etiology felt to be related potentially to ATN in the setting of uncontrolled hypertension and complicated by contrast associated nephropathy  Nephrology was consulted and followed and a UA showed microhematuria and proteinuria.  An ultrasound of the kidney/bladder showed wall thickening but no hydronephrosis  Bladder scans negative for retention  Monitor BMP triweekly or sooner if clinically indicated  Demadex has been on hold and had periodically required IV fluids  Nephrology following; repeat labs tomorrow, will check orthostatics per nephrology   Follow with nephrology as an outpatient or sooner on the ARC if any acute changes  Encephalopathy  Had lethargy on exam back on 10/17 in acute care with improvements however more recently had issues with confusion and decreased consciousness in the mornings that improves throughout the day with unclear etiology  Prior history of cognitive impairments with last MoCA score of 18  CT of the head was stable, B12 level (446) wnl  Was evaluated by psychiatry with no changes in medications recommended  EEG showed no epileptiform activity just moderate nonspecific dysfunction  Will need to monitor especially with change in environment now on the ARC for any changes  Impaired mobility and activities of daily living  Patient was evaluated by the rehabilitation team MD and an appropriate candidate for acute inpatient rehabilitation program at this time.  The patient will tolerate 3 hours/day 5 to 7 days/week of intensive physical, occupational and speech therapy in order to obtain goals for community discharge  Due to the patient's functional Compared to their baseline level of function in addition to their ongoing medical needs, the patient would  benefit from daily supervision from a rehabilitation physician as well as rehabilitation nursing to implement and adjust the medical as well as functional plan of care in order to meet the patient's goals.  DC: 11/18 homecare PT/OT/SLP/RN/HHA  At risk for alteration in bowel function  Constipated no bm for several days; lactulose PRN  Start colase, senna daily and miralax prn  Titrate as needed   Overgrown toenails  Cs podiatry   Vitamin D deficiency  <7 10/7  Mgmt per IM/nephrology   Fall during current hospitalization  Patient with fall to knees at shift change 10/31   Denied any knee pain  Get larger bariatric chair   Fall precautions.   Orthostatic hypotension  Noted to have orthostatic hypotension during therapy   Apply TEDs/Binder if available for patient size.   Fall precautions  Monitor anemia as above   BP medication titration per IM   Spasticity  With increased flexion tone in RUE  Due to patients kidney function would not be a good candidate for baclofen, and with his episodes of hypotension while at rehab would hold off on starting tizanidine.   Safest option to address patients spasticity would be neurotoxin injections given local effect     History of Present Illness   Tommie Taylor is a 59 y.o. male with history of bipolar disorder, CKD stage IV, type 2 diabetes, hypertension, tardive dyskinesia, sleep apnea, GERD who presented to the [facility] on 10/14/2024 for right-sided weakness and facial droop.  MRI revealed a left corona radiata infarct and neurology was consulted and was administered TNK.  He initially required a Cardene drip for his hypertensive emergency and was eventually placed on dual antiplatelet therapy with plan to continue aspirin and Plavix until that date and continue with aspirin as monotherapy as well as atorvastatin for secondary stroke prophylaxis.  He was recommended for Zio patch placement as an outpatient.  His hospital course was complicated by  acute kidney injury on top of his CKD thought to be related to ATN in the setting of uncontrolled blood pressure as well as contrast associated nephropathy.  Nephrology did follow during his course and had some levels of improvement however has not returned back to his baseline level of creatinine.  Additionally there was some levels of encephalopathy which are stable at this time. The patient was evaluated by the Rehabilitation team and deemed an appropriate candidate for comprehensive inpatient rehabilitation and admitted to the [facility] on 10/26/2024  5:13 PM     Rehab Diagnosis: Impairment of mobility, safety, Activities of Daily Living (ADLs), and cognitive/communication skills due to Stroke:  01.2  Right Body Involvement (Left Brain)  Etiologic Dx: Left Corona Radiata Infarct  Date of Onset: 10/14/2024   Date of surgery: N/A  Chief Complaint: f/u ambulatory dysfunction    Interval: Patient seen and examined in recliner sleeping. Woke up easily with stimulation. No events overnight.  Reports overall feeling well. Last BM 11/11. Sleeping well at night. Denies any f/c/n/v, CP, SOB, abdominal pain, constipation, or diarrhea.       Objective   Functional Update:  Physical Therapy Occupational Therapy Speech Therapy   Weight Bearing Status: Full Weight Bearing  Transfers: Incidental Touching  Bed Mobility: Incidental Touching  Amulation Distance (ft): 100 feet  Ambulation: Incidental Touching, Minimal Assistance  Assistive Device for Ambulation:  (no device)  Wheelchair Mobility Distance: 50 ft  Wheelchair Mobility: Minimal Assistance  Number of Stairs: 8  Assistive Device for Stairs: Lehft Hand Rail  Stair Assistance: Incidental Touching  Ramp: Minimal Assistance  Discharge Recommendations: Home with:  DC Home with:: Family Support, Home Physical Therapy   Eating: Supervision (set-up)  Grooming: Supervision  Bathing: Moderate Assistance  Bathing: Moderate Assistance  Upper Body Dressing: Minimal Assistance  Lower Body  Dressing: Minimal Assistance (footwear mod A)  Toileting: Moderate Assistance (intermittent incontinence of urine, having BM infrequently whihc he reports is baseline)  Tub/Shower Transfer: Minimal Assistance  Toilet Transfer: Minimal Assistance  Cognition: Exceptions to WNL  Cognition: Decreased Memory, Decreased Executive Functions, Decreased Attention, Decreased Safety, Decreased Comprehension  Orientation: Person, Place, Time, Situation   Mode of Communication: Verbal  Speech/Language: Dysarthia  Cognition: Exceptions to WNL  Cognition: Decreased Memory, Decreased Executive Functions, Decreased Attention, Decreased Comprehension  Orientation: Place, Time, Situation, Person  Discharge Recommendations: Home with:  DC Home with:: 24 Hour Supervision, 24 Hour Assisteance, Family Support, Home Speech Therapy, Outpatient Speech Therapy         Temp:  [95.5 °F (35.3 °C)-96.3 °F (35.7 °C)] 96.3 °F (35.7 °C)  HR:  [67-80] 80  Resp:  [18] 18  BP: (120-177)/(65-89) 120/65  SpO2:  [98 %-99 %] 99 %    Physical Exam    Gen: No acute distress, obese  HEENT: Moist mucus membranes, Normocephalic/Atraumatic  Cardiovascular: Regular rate, rhythm,  Heme/Extr: mild bilateral edema   Pulmonary: Non-labored breathing. Lungs CTAB  : No amezquita  GI:  non-distended. BS+  MSK: ROM is WFL in all extremities.   Integumentary: Skin is warm, dry. .  Neuro: Speech is intact. Appropriate to questioning. RUE increased tone at elbow MAS 1+ RLE 4/5  Psych: Normal mood and affect.       Scheduled Meds:  Current Facility-Administered Medications   Medication Dose Route Frequency Provider Last Rate    acetaminophen  650 mg Oral Q6H PRN Alex S Ariel, DO      aspirin  81 mg Oral Daily Alex LUIS George, DO      atorvastatin  40 mg Oral QPM Alexrobert George, DO      buPROPion  100 mg Oral BID Alexrobert George, DO      calcium carbonate  1,000 mg Oral BID PRN Alex S Ariel, DO      docusate sodium  100 mg Oral BID Cathy Miranda MD      fluticasone  1 spray Each  Nare Daily Joe Garcia, SAMANTHA      heparin (porcine)  7,500 Units Subcutaneous Q8H MIGEL Alex S George, DO      hydrALAZINE  10 mg Oral Q8H MIGEL Joe Garcia, CRNP      labetalol  300 mg Oral BID Alex S George, DO      lactulose  20 g Oral TID PRN Cathy Miranda MD      loratadine  5 mg Oral Daily Joe Garcia, CRNP      melatonin  3 mg Oral HS Joe Garcia, CRNP      NIFEdipine  60 mg Oral Daily Joe Garcia, CRNP      OLANZapine  10 mg Oral HS Alex S George, DO      ondansetron  4 mg Oral Q6H PRN oJe Garcia, CRNP      pantoprazole  40 mg Oral Early Morning Alex S George, DO      polyethylene glycol  17 g Oral Daily Cathy Miranda MD      senna  2 tablet Oral HS Cathy Miranda MD      traZODone  50 mg Oral HS PRN Alex S George, DO      Valbenazine Tosylate  40 mg Oral HS Alex S George, DO           Lab Results: I have reviewed the following results:  Results from last 7 days   Lab Units 11/14/24  0530 11/11/24  0548   HEMOGLOBIN g/dL 9.8* 8.7*   HEMATOCRIT % 28.4* 25.9*   WBC Thousand/uL 5.38 6.15   PLATELETS Thousands/uL 184 159     Results from last 7 days   Lab Units 11/14/24  0530 11/12/24  0555 11/11/24  0548   BUN mg/dL 40* 38* 43*   SODIUM mmol/L 136 138 137   POTASSIUM mmol/L 4.7 4.5 4.7   CHLORIDE mmol/L 105 107 107   CREATININE mg/dL 3.83* 3.78* 3.91*              Cathy Miranda MD   Physical Medicine and Rehabilitation   11/14/24    I have spent a total time of 37 minutes in caring for this patient on the day of the visit/encounter including Patient and family education, Counseling / Coordination of care, Documenting in the medical record, and Communicating with other healthcare professionals .

## 2024-11-14 NOTE — TELEPHONE ENCOUNTER
Marie was transferred to this writer from the call center. She said Tommie has insurance with Medicare and she will reach out to the pharmacy to give that information. She said it is probably best for staff to reach out to her if they need anything. Nothing further needed at this time.

## 2024-11-14 NOTE — ASSESSMENT & PLAN NOTE
Etiology: Suspect secondary to mild dynamic changes in the setting of uncontrolled hypertension, autoregulatory failure and ATN  Peak creatinine 4.57 on 10/21/2024  Previous baseline fluctuated between high 2 and low 3 range as outpatient  ROSINA has improved and likely new baseline mid 3 to high 3 range  Current creatinine today 3.83 and remained stable  Noted nephrotic range proteinuria underlying disease most likely secondary to diabetic kidney disease.  Will repeat labs in a.m. and if remains stable will seen on Monday Thursday schedule as per unit policy next week

## 2024-11-14 NOTE — PROGRESS NOTES
11/14/24 1400   Pain Assessment   Pain Assessment Tool 0-10   Pain Score No Pain   Restrictions/Precautions   Precautions Bed/chair alarms;Fall Risk;Cognitive;Supervision on toilet/commode   Weight Bearing Restrictions No   ROM Restrictions No   Braces or Orthoses   (TEDs)   Cognition   Overall Cognitive Status Impaired   Arousal/Participation Alert;Cooperative   Attention Attends with cues to redirect   Orientation Level Oriented X4   Memory Decreased short term memory   Following Commands Follows one step commands with increased time or repetition   Sit to Stand   Type of Assistance Needed Incidental touching   Physical Assistance Level No physical assistance   Sit to Stand CARE Score 4   Bed-Chair Transfer   Type of Assistance Needed Incidental touching   Physical Assistance Level No physical assistance   Comment CGA no device   Chair/Bed-to-Chair Transfer CARE Score 4   Transfer Bed/Chair/Wheelchair   Limitations Noted In Balance;Coordination   Walk 10 Feet   Type of Assistance Needed Incidental touching   Physical Assistance Level No physical assistance   Comment CGA no AD, gait belt   Walk 10 Feet CARE Score 4   Walk 50 Feet with Two Turns   Type of Assistance Needed Incidental touching   Physical Assistance Level No physical assistance   Comment CGA no device   Walk 50 Feet with Two Turns CARE Score 4   Ambulation   Primary Mode of Locomotion Prior to Admission Walk   Distance Walked (feet) 100 ft  (x1, 50ft x1)   Assist Device   (None)   Gait Pattern Inconsistant Lesley;Slow Lesley;Decreased foot clearance;Improper weight shift   Limitations Noted In Balance;Endurance;Strength   Provided Assistance with: Balance   Walk Assist Level Contact Guard   Does the patient walk? 2. Yes   Curb or Single Stair   Style negotiated Single stair   Type of Assistance Needed Incidental touching   Physical Assistance Level No physical assistance   Comment CGA   1 Step (Curb) CARE Score 4   4 Steps   Type of Assistance  "Needed Incidental touching   Physical Assistance Level No physical assistance   Comment CGA with single rail, reciprocal pattern   4 Steps CARE Score 4   Stairs   Type Stairs   # of Steps 4   Weight Bearing Precautions Fall Risk   Assist Devices Single Rail   Therapeutic Interventions   Strengthening Standing TE in // bars 3 x 10: heel raises, hip flexion   Balance fwd stepping over 4 SPCs x6 with CGA, sidestepping over 4 SPCs x 6 with CGA; fwd/bwd walking 20ft x2 ea direction; fwd/bwd stepping over SPC x 10; tap ups onto 8\" step Patria   Assessment   Treatment Assessment Patient participcated in 60 minute PT session focused on balance training. Patient completes amublation with CS/CGA with decreased foot clerance bilaterally when fatigued. Patient requires cues and assist to use RUE on // bars for balance tasks due to tone. Patient would continue to benefit from skilled PT interventions for balance training and family training to ensure safe discharge home.   Problem List Impaired balance;Decreased strength;Decreased endurance;Decreased mobility;Decreased cognition;Decreased coordination;Impaired judgement;Decreased safety awareness   Barriers to Discharge Decreased caregiver support   Plan   Treatment/Interventions Functional transfer training;LE strengthening/ROM;Elevations;Therapeutic exercise;Endurance training;Patient/family training   Progress Progressing toward goals   PT Therapy Minutes   PT Time In 1400   PT Time Out 1500   PT Total Time (minutes) 60   PT Mode of treatment - Individual (minutes) 60   PT Mode of treatment - Concurrent (minutes) 0   PT Mode of treatment - Group (minutes) 0   PT Mode of treatment - Co-treat (minutes) 0   PT Mode of Treatment - Total time(minutes) 60 minutes   PT Cumulative Minutes 1585   Therapy Time missed   Time missed? No     "

## 2024-11-14 NOTE — PLAN OF CARE
Problem: PAIN - ADULT  Goal: Verbalizes/displays adequate comfort level or baseline comfort level  Description: Interventions:  - Encourage patient to monitor pain and request assistance  - Assess pain using appropriate pain scale  - Administer analgesics based on type and severity of pain and evaluate response  - Implement non-pharmacological measures as appropriate and evaluate response  - Consider cultural and social influences on pain and pain management  - Notify physician/advanced practitioner if interventions unsuccessful or patient reports new pain  Outcome: Progressing     Problem: INFECTION - ADULT  Goal: Absence or prevention of progression during hospitalization  Description: INTERVENTIONS:  - Assess and monitor for signs and symptoms of infection  - Monitor lab/diagnostic results  - Monitor all insertion sites, i.e. indwelling lines, tubes, and drains  - Monitor endotracheal if appropriate and nasal secretions for changes in amount and color  - [place] appropriate cooling/warming therapies per order  - Administer medications as ordered  - Instruct and encourage patient and family to use good hand hygiene technique  - Identify and instruct in appropriate isolation precautions for identified infection/condition  Outcome: Progressing  Goal: Absence of fever/infection during neutropenic period  Description: INTERVENTIONS:  - Monitor WBC    Outcome: Progressing     Problem: SAFETY ADULT  Goal: Patient will remain free of falls  Description: INTERVENTIONS:  - Educate patient/family on patient safety including physical limitations  - Instruct patient to call for assistance with activity   - Consult OT/PT to assist with strengthening/mobility   - Keep Call bell within reach  - Keep bed low and locked with side rails adjusted as appropriate  - Keep care items and personal belongings within reach  - Initiate and maintain comfort rounds  - Make Fall Risk Sign visible to staff  - Offer Toileting every 2 Hours,  in advance of need  - Initiate/Maintain bed alarm  - Obtain necessary fall risk management equipment: bed alarm  - Apply yellow socks and bracelet for high fall risk patients  - Consider moving patient to room near nurses station  Outcome: Progressing  Goal: Maintain or return to baseline ADL function  Description: INTERVENTIONS:  -  Assess patient's ability to carry out ADLs; assess patient's baseline for ADL function and identify physical deficits which impact ability to perform ADLs (bathing, care of mouth/teeth, toileting, grooming, dressing, etc.)  - Assess/evaluate cause of self-care deficits   - Assess range of motion  - Assess patient's mobility; develop plan if impaired  - Assess patient's need for assistive devices and provide as appropriate  - Encourage maximum independence but intervene and supervise when necessary  - Involve family in performance of ADLs  - Assess for home care needs following discharge   - Consider OT consult to assist with ADL evaluation and planning for discharge  - Provide patient education as appropriate  Outcome: Progressing  Goal: Maintains/Returns to pre admission functional level  Description: INTERVENTIONS:  - Perform AM-PAC 6 Click Basic Mobility/ Daily Activity assessment daily.  - Set and communicate daily mobility goal to care team and patient/family/caregiver.   - Collaborate with rehabilitation services on mobility goals if consulted  - Perform Range of Motion 2 times a day.  - Reposition patient every 2 hours.  - Dangle patient 2 times a day  - Stand patient 2 times a day  - Ambulate patient 2 times a day  - Out of bed to chair 2 times a day   - Out of bed for meals 2 times a day  - Out of bed for toileting  - Record patient progress and toleration of activity level   Outcome: Progressing     Problem: DISCHARGE PLANNING  Goal: Discharge to home or other facility with appropriate resources  Description: INTERVENTIONS:  - Identify barriers to discharge w/patient and  caregiver  - Arrange for needed discharge resources and transportation as appropriate  - Identify discharge learning needs (meds, wound care, etc.)  - Arrange for interpretive services to assist at discharge as needed  - Refer to Case Management Department for coordinating discharge planning if the patient needs post-hospital services based on physician/advanced practitioner order or complex needs related to functional status, cognitive ability, or social support system  Outcome: Progressing     Problem: Knowledge Deficit  Goal: Patient/family/caregiver demonstrates understanding of disease process, treatment plan, medications, and discharge instructions  Description: Complete learning assessment and assess knowledge base.  Interventions:  - Provide teaching at level of understanding  - Provide teaching via preferred learning methods  Outcome: Progressing     Problem: NEUROSENSORY - ADULT  Goal: Achieves stable or improved neurological status  Description: INTERVENTIONS  - Monitor and report changes in neurological status  - Monitor vital signs such as temperature, blood pressure, glucose, and any other labs ordered   - Initiate measures to prevent increased intracranial pressure  - Monitor for seizure activity and implement precautions if appropriate      Outcome: Progressing  Goal: Remains free of injury related to seizures activity  Description: INTERVENTIONS  - Maintain airway, patient safety  and administer oxygen as ordered  - Monitor patient for seizure activity, document and report duration and description of seizure to physician/advanced practitioner  - If seizure occurs,  ensure patient safety during seizure  - Reorient patient post seizure  - Seizure pads on all 4 side rails  - Instruct patient/family to notify RN of any seizure activity including if an aura is experienced  - Instruct patient/family to call for assistance with activity based on nursing assessment  - Administer anti-seizure medications if  ordered    Outcome: Progressing  Goal: Achieves maximal functionality and self care  Description: INTERVENTIONS  - Monitor swallowing and airway patency with patient fatigue and changes in neurological status  - Encourage and assist patient to increase activity and self care.   - Encourage visually impaired, hearing impaired and aphasic patients to use assistive/communication devices  Outcome: Progressing     Problem: CARDIOVASCULAR - ADULT  Goal: Maintains optimal cardiac output and hemodynamic stability  Description: INTERVENTIONS:  - Monitor I/O, vital signs and rhythm  - Monitor for S/S and trends of decreased cardiac output  - Administer and titrate ordered vasoactive medications to optimize hemodynamic stability  - Assess quality of pulses, skin color and temperature  - Assess for signs of decreased coronary artery perfusion  - Instruct patient to report change in severity of symptoms  Outcome: Progressing  Goal: Absence of cardiac dysrhythmias or at baseline rhythm  Description: INTERVENTIONS:  - Continuous cardiac monitoring, vital signs, obtain 12 lead EKG if ordered  - Administer antiarrhythmic and heart rate control medications as ordered  - Monitor electrolytes and administer replacement therapy as ordered  Outcome: Progressing     Problem: RESPIRATORY - ADULT  Goal: Achieves optimal ventilation and oxygenation  Description: INTERVENTIONS:  - Assess for changes in respiratory status  - Assess for changes in mentation and behavior  - Position to facilitate oxygenation and minimize respiratory effort  - Oxygen administered by appropriate delivery if ordered  - Initiate smoking cessation education as indicated  - Encourage broncho-pulmonary hygiene including cough, deep breathe, Incentive Spirometry  - Assess the need for suctioning and aspirate as needed  - Assess and instruct to report SOB or any respiratory difficulty  - Respiratory Therapy support as indicated  Outcome: Progressing     Problem:  METABOLIC, FLUID AND ELECTROLYTES - ADULT  Goal: Electrolytes maintained within normal limits  Description: INTERVENTIONS:  - Monitor labs and assess patient for signs and symptoms of electrolyte imbalances  - Administer electrolyte replacement as ordered  - Monitor response to electrolyte replacements, including repeat lab results as appropriate  - Instruct patient on fluid and nutrition as appropriate  Outcome: Progressing  Goal: Fluid balance maintained  Description: INTERVENTIONS:  - Monitor labs   - Monitor I/O and WT  - Instruct patient on fluid and nutrition as appropriate  - Assess for signs & symptoms of volume excess or deficit  Outcome: Progressing  Goal: Glucose maintained within target range  Description: INTERVENTIONS:  - Monitor Blood Glucose as ordered  - Assess for signs and symptoms of hyperglycemia and hypoglycemia  - Administer ordered medications to maintain glucose within target range  - Assess nutritional intake and initiate nutrition service referral as needed  Outcome: Progressing     Problem: MUSCULOSKELETAL - ADULT  Goal: Maintain or return mobility to safest level of function  Description: INTERVENTIONS:  - Assess patient's ability to carry out ADLs; assess patient's baseline for ADL function and identify physical deficits which impact ability to perform ADLs (bathing, care of mouth/teeth, toileting, grooming, dressing, etc.)  - Assess/evaluate cause of self-care deficits   - Assess range of motion  - Assess patient's mobility  - Assess patient's need for assistive devices and provide as appropriate  - Encourage maximum independence but intervene and supervise when necessary  - Involve family in performance of ADLs  - Assess for home care needs following discharge   - Consider OT consult to assist with ADL evaluation and planning for discharge  - Provide patient education as appropriate  Outcome: Progressing  Goal: Maintain proper alignment of affected body part  Description:  INTERVENTIONS:  - Support, maintain and protect limb and body alignment  - Provide patient/ family with appropriate education  Outcome: Progressing     Problem: MOBILITY - ADULT  Goal: Maintain or return to baseline ADL function  Description: INTERVENTIONS:  -  Assess patient's ability to carry out ADLs; assess patient's baseline for ADL function and identify physical deficits which impact ability to perform ADLs (bathing, care of mouth/teeth, toileting, grooming, dressing, etc.)  - Assess/evaluate cause of self-care deficits   - Assess range of motion  - Assess patient's mobility; develop plan if impaired  - Assess patient's need for assistive devices and provide as appropriate  - Encourage maximum independence but intervene and supervise when necessary  - Involve family in performance of ADLs  - Assess for home care needs following discharge   - Consider OT consult to assist with ADL evaluation and planning for discharge  - Provide patient education as appropriate  Outcome: Progressing  Goal: Maintains/Returns to pre admission functional level  Description: INTERVENTIONS:  - Perform AM-PAC 6 Click Basic Mobility/ Daily Activity assessment daily.  - Set and communicate daily mobility goal to care team and patient/family/caregiver.   - Collaborate with rehabilitation services on mobility goals if consulted  - Perform Range of Motion 2 times a day.  - Reposition patient every 2 hours.  - Dangle patient 2 times a day  - Stand patient 2 times a day  - Ambulate patient 2 times a day  - Out of bed to chair 2 times a day   - Out of bed for meals 2 times a day  - Out of bed for toileting  - Record patient progress and toleration of activity level   Outcome: Progressing     Problem: MOBILITY - ADULT  Goal: Maintain or return to baseline ADL function  Description: INTERVENTIONS:  -  Assess patient's ability to carry out ADLs; assess patient's baseline for ADL function and identify physical deficits which impact ability to  perform ADLs (bathing, care of mouth/teeth, toileting, grooming, dressing, etc.)  - Assess/evaluate cause of self-care deficits   - Assess range of motion  - Assess patient's mobility; develop plan if impaired  - Assess patient's need for assistive devices and provide as appropriate  - Encourage maximum independence but intervene and supervise when necessary  - Involve family in performance of ADLs  - Assess for home care needs following discharge   - Consider OT consult to assist with ADL evaluation and planning for discharge  - Provide patient education as appropriate  Outcome: Progressing  Goal: Maintains/Returns to pre admission functional level  Description: INTERVENTIONS:  - Perform AM-PAC 6 Click Basic Mobility/ Daily Activity assessment daily.  - Set and communicate daily mobility goal to care team and patient/family/caregiver.   - Collaborate with rehabilitation services on mobility goals if consulted  - Perform Range of Motion 2 times a day.  - Reposition patient every 2 hours.  - Dangle patient 2 times a day  - Stand patient 2 times a day  - Ambulate patient 2 times a day  - Out of bed to chair 2 times a day   - Out of bed for meals 2 times a day  - Out of bed for toileting  - Record patient progress and toleration of activity level   Outcome: Progressing     Problem: Potential for Falls  Goal: Patient will remain free of falls  Description: INTERVENTIONS:  - Educate patient/family on patient safety including physical limitations  - Instruct patient to call for assistance with activity   - Consult OT/PT to assist with strengthening/mobility   - Keep Call bell within reach  - Keep bed low and locked with side rails adjusted as appropriate  - Keep care items and personal belongings within reach  - Initiate and maintain comfort rounds  - Make Fall Risk Sign visible to staff  - Offer Toileting every 2 Hours, in advance of need  - Initiate/Maintain bed alarm  - Obtain necessary fall risk management equipment:  bed alarm  - Apply yellow socks and bracelet for high fall risk patients  - Consider moving patient to room near nurses station  Outcome: Progressing     Problem: Prexisting or High Potential for Compromised Skin Integrity  Goal: Skin integrity is maintained or improved  Description: INTERVENTIONS:  - Identify patients at risk for skin breakdown  - Assess and monitor skin integrity  - Assess and monitor nutrition and hydration status  - Monitor labs   - Assess for incontinence   - Turn and reposition patient  - Assist with mobility/ambulation  - Relieve pressure over bony prominences  - Avoid friction and shearing  - Provide appropriate hygiene as needed including keeping skin clean and dry  - Evaluate need for skin moisturizer/barrier cream  - Collaborate with interdisciplinary team   - Patient/family teaching  - Consider wound care consult   Outcome: Progressing     Problem: GENITOURINARY - ADULT  Goal: Maintains or returns to baseline urinary function  Description: INTERVENTIONS:  - Assess urinary function  - Encourage oral fluids to ensure adequate hydration if ordered  - Administer IV fluids as ordered to ensure adequate hydration  - Administer ordered medications as needed  - Offer frequent toileting  - Follow urinary retention protocol if ordered  Outcome: Progressing

## 2024-11-14 NOTE — ASSESSMENT & PLAN NOTE
Lab Results   Component Value Date    EGFR 16 11/14/2024    EGFR 16 11/12/2024    EGFR 15 11/11/2024    CREATININE 3.83 (H) 11/14/2024    CREATININE 3.78 (H) 11/12/2024    CREATININE 3.91 (H) 11/11/2024

## 2024-11-14 NOTE — PROGRESS NOTES
11/13/24 1100   Pain Assessment   Pain Assessment Tool 0-10   Pain Score No Pain   Restrictions/Precautions   Precautions Bed/chair alarms;Fall Risk;Supervision on toilet/commode   Sit to Lying   Type of Assistance Needed Physical assistance   Physical Assistance Level 25% or less   Comment slight assist to lift RLE onto mat table   Sit to Lying CARE Score 3   Lying to Sitting on Side of Bed   Type of Assistance Needed Physical assistance   Physical Assistance Level 25% or less   Comment min A to right side   Lying to Sitting on Side of Bed CARE Score 3   Sit to Stand   Type of Assistance Needed Incidental touching   Physical Assistance Level No physical assistance   Comment no AD   Sit to Stand CARE Score 4   Bed-Chair Transfer   Type of Assistance Needed Incidental touching   Physical Assistance Level No physical assistance   Comment no AD CGA   Chair/Bed-to-Chair Transfer CARE Score 4   ROM- Right Upper Extremities   R Shoulder Prolonged stretch;Flexion;ABduction;External rotation   R Elbow Prolonged stretch;Elbow extension   Neuromuscular Education   Comments Eileen Stim program 1 with 5 seconds on 5 seconds off  intensity 35 for 30 minutes with placement of electords on wrist and digit extensors. Post estim, pt noted with trace extension of digit II only. Continue with dialy estim of elbow extensors, and wrist//digit extensors.   Coordination   Gross Motor f   Assessment   Treatment Assessment Patient engaged in 30 minute OT session with focus on application of heat pack to pectoralis major muscle prior to prolonged stretch of shoudler flexors, elbow exntesion, and shoulder abduction. Pt noted with signficant reduction in tone and able to achieve resting positon of extension at elbow, neutral forearm, and shoulder abduction. Pt able to maintain this extension position for 3 hours after prolonged stretch  at 11am. At end of sesion pt placed in recliner for lunch with use of pillow to promote maintanence of  elbow extension and shoulder abduction. Pt tolerated session well.   OT Therapy Minutes   OT Time In 1100   OT Time Out 1130   OT Total Time (minutes) 30   OT Mode of treatment - Individual (minutes) 30   OT Mode of treatment - Concurrent (minutes) 0   OT Mode of treatment - Group (minutes) 0   OT Mode of treatment - Co-treat (minutes) 0   OT Mode of Treatment - Total time(minutes) 30 minutes   OT Cumulative Minutes 1240

## 2024-11-14 NOTE — ASSESSMENT & PLAN NOTE
Lab Results   Component Value Date    EGFR 16 11/14/2024    EGFR 16 11/12/2024    EGFR 15 11/11/2024    CREATININE 3.83 (H) 11/14/2024    CREATININE 3.78 (H) 11/12/2024    CREATININE 3.91 (H) 11/11/2024     Recent creatinine of 3.83 11/14 Prior baseline around 2.9-3.0  Etiology felt to be related potentially to ATN in the setting of uncontrolled hypertension and complicated by contrast associated nephropathy  Nephrology was consulted and followed and a UA showed microhematuria and proteinuria.  An ultrasound of the kidney/bladder showed wall thickening but no hydronephrosis  Bladder scans negative for retention  Monitor BMP triweekly or sooner if clinically indicated  Demadex has been on hold and had periodically required IV fluids  Nephrology following; repeat labs tomorrow, will check orthostatics per nephrology   Follow with nephrology as an outpatient or sooner on the [facility] if any acute changes

## 2024-11-14 NOTE — ASSESSMENT & PLAN NOTE
Initially presented with R sided weakness and R facial droop.   CTH and CTA negative for acute process.   MRI showed left corona radiata stroke.   Received TNK.    Completed 3 weeks of DAPT on 11/6.  Continue with ASA 81mg daily.  Continue statin.    Neurovascular checks Q shift.  Maintain normotension.    Follow up with Neurovascular in 4-6 weeks as outpatient.  Recommending Zio patch/Loop.     PT/OT/ST per primary team.     Complaints of worsening RLE weakness on 11/4 after therapies - CTH stable.  Noted to have orthostasis - recommend obtaining higher BP to prevent symptoms.   none

## 2024-11-14 NOTE — ASSESSMENT & PLAN NOTE
Hgb currently 9.8.  Hemoglobin 12.1 on admission.   Most recent iron panel on 10/15: Iron Sat 38%, TIBC 204, Iron 77, and Ferritin 111.  No need for iron supplementation at this time.  FOBT 2 out of 3 negative.  3rd to be obtained.  Completed 3 doses of IV Venofer per Nephrology recs.  Continue to trend routine CBC.

## 2024-11-14 NOTE — PROGRESS NOTES
Progress Note - Nephrology   Name: Tommie Taylor 59 y.o. male I MRN: [id number]  Unit/Bed#: -01 I Date of Admission: 10/26/2024   Date of Service: 11/14/2024 I Hospital Day: 19     Assessment & Plan  Acute kidney injury superimposed on stage 4 chronic kidney disease (HCC)  Etiology: Suspect secondary to mild dynamic changes in the setting of uncontrolled hypertension, autoregulatory failure and ATN  Peak creatinine 4.57 on 10/21/2024  Previous baseline fluctuated between high 2 and low 3 range as outpatient  ROSINA has improved and likely new baseline mid 3 to high 3 range  Current creatinine today 3.83 and remained stable  Noted nephrotic range proteinuria underlying disease most likely secondary to diabetic kidney disease.  Will repeat labs in a.m. and if remains stable will seen on Monday Thursday schedule as per unit policy next week  Chronic kidney disease  Lab Results   Component Value Date    EGFR 16 11/14/2024    EGFR 16 11/12/2024    EGFR 15 11/11/2024    CREATININE 3.83 (H) 11/14/2024    CREATININE 3.78 (H) 11/12/2024    CREATININE 3.91 (H) 11/11/2024   Etiology: Progressive CKD the setting of diabetic nephropathy hypertensive nephrosclerosis  Follows with Dr. No outpatient and last seen on 10/8/2024  Previous baseline creatinine high 2 to low 3 range as outpatient  Need hospital follow-up on discharge for ongoing CKD management  Hypertension  Initially with hypertensive emergency improved  Current blood pressure can fluctuate between 150s to 170s  Currently on : labetalol 300 mg p.o. twice daily, nifedipine 60 mg p.o. daily, hydralazine 10 mg p.o. every 8hr  His standing blood pressure is around 120/80, otherwise he has blood pressures in the 1 60-1 70 systolic range, will avoid tight blood pressure control while inpatient and rehab uptitrate hydralazine as needed  Will check orthostatics every shift x 3 higher to further blood pressure medication changes to avoid hypotension  Orthostatic  hypotension    Will avoid overaggressive BP management  Consider compression stockings  Chronic kidney disease-mineral and bone disorder (CKD-MBD)  Not on phosphorus binders  Phosphorus has improved to 4.5 on 11/8/2024  Vit D deficiency-vitamin D levels less than 7 on 10/7/2024-previously on vitamin D 50,000 units weekly  Will check phosphorus and vitamin D along with PTH in a.m.-reviewed further recommendations will be forthcoming  Anemia, unspecified  Current hemoglobin is 9.8 as of 11/14/2024 and appears stable  Status post Venofer infusion with last dose on 11/8/2024  KOBI relatively contraindicated given recent CVA.  Hgb remains below goal  Transfuse as needed per primary team  Previous workup  Stool for occult blood negative  SPEP negative for monoclonal gammopathy  Iron saturation 38% with a ferritin of 111.  CVA (cerebral vascular accident) ([facility])  Status post TNK administration  Management as per primary service and neurology.  Now in [facility] rehab  Bipolar I disorder, severe, current or most recent episode depressed, with psychotic features ([facility])  Follow-up with psych  Currently on Zyprexa and Wellbutrin  DM2 (diabetes mellitus, type 2) ([facility])  Lab Results   Component Value Date    HGBA1C 8.6 (H) 10/16/2024   Continue on insulin  most recent A1c 8.6% as of October 2024  Advised of tight glycemic control for hemoglobin less than 7.0 to slow the progression of CKD  Management per primary team  Impaired mobility and activities of daily living  Currently with wheelchair  Follow-up with [facility] rehab  Hyperlipidemia  Continue on atorvastatin  Goal LDL less than 70  Management per primary team    Overall plan and recommendations has been reviewed with primary team and I agree with the plan as stated below  Check BMP, mag, Phos, vitamin D, PTH in a.m.-reviewed further recommendations will be forthcoming  Consider compression stockings given orthostatic hypotension  Check orthostatics shift x 3    Review of Systems  Patient  seen and examined at bedside.  No acute events overnight.  Patient reports feels okay just waking up  Review of Systems   Constitutional: Negative.  Negative for activity change, appetite change, chills, diaphoresis, fatigue and fever.   HENT: Negative.  Negative for congestion and facial swelling.    Respiratory: Negative.     Cardiovascular: Negative.    Gastrointestinal: Negative.    Endocrine: Negative.    Genitourinary: Negative.    Musculoskeletal: Negative.    Skin: Negative.    Allergic/Immunologic: Negative.    Neurological: Negative.    Hematological: Negative.    Psychiatric/Behavioral: Negative.           Physical Exam:  Current Weight: Weight - Scale: 120 kg (264 lb 12.8 oz)  Vitals:    11/13/24 1319 11/13/24 1508 11/13/24 2121 11/14/24 0531   BP: (!) 177/89 155/78 (!) 172/82 159/85   BP Location: Left arm Left arm  Left arm   Pulse:  67 77 70   Resp:  18  18   Temp:  (!) 95.8 °F (35.4 °C)  (!) 95.5 °F (35.3 °C)   TempSrc:  Tympanic  Tympanic   SpO2:  98%  99%   Weight:       Height:           Physical Exam  Vitals and nursing note reviewed.   Constitutional:       Appearance: Normal appearance.   HENT:      Head: Normocephalic and atraumatic.      Nose: Nose normal.      Mouth/Throat:      Mouth: Mucous membranes are moist.      Pharynx: Oropharynx is clear.   Eyes:      Extraocular Movements: Extraocular movements intact.      Conjunctiva/sclera: Conjunctivae normal.   Cardiovascular:      Rate and Rhythm: Normal rate and regular rhythm.      Pulses: Normal pulses.      Heart sounds: Normal heart sounds.   Pulmonary:      Effort: Pulmonary effort is normal.   Abdominal:      General: Bowel sounds are normal.      Palpations: Abdomen is soft.   Musculoskeletal:         General: Normal range of motion.      Cervical back: Normal range of motion and neck supple.   Skin:     General: Skin is warm and dry.   Neurological:      General: No focal deficit present.      Mental Status: He is alert.    Psychiatric:         Mood and Affect: Mood normal.         Behavior: Behavior normal.            Medications:    Current Facility-Administered Medications:     acetaminophen (TYLENOL) tablet 650 mg, 650 mg, Oral, Q6H PRN, Alex S Ariel, DO, 650 mg at 11/07/24 0737    aspirin (ECOTRIN LOW STRENGTH) EC tablet 81 mg, 81 mg, Oral, Daily, Alex S George, DO, 81 mg at 11/13/24 0820    atorvastatin (LIPITOR) tablet 40 mg, 40 mg, Oral, QPM, Alex S Ariel, DO, 40 mg at 11/13/24 1729    buPROPion (WELLBUTRIN) tablet 100 mg, 100 mg, Oral, BID, Alex S Ariel, DO, 100 mg at 11/13/24 1729    calcium carbonate (TUMS) chewable tablet 1,000 mg, 1,000 mg, Oral, BID PRN, Alex S Ariel DO    docusate sodium (COLACE) capsule 100 mg, 100 mg, Oral, BID, Cathy Miranda MD, 100 mg at 11/13/24 1729    fluticasone (FLONASE) 50 mcg/act nasal spray 1 spray, 1 spray, Each Nare, Daily, SAMANTHA Kern, 1 spray at 11/13/24 0819    heparin (porcine) subcutaneous injection 7,500 Units, 7,500 Units, Subcutaneous, Q8H MIGEL, Alex S George, DO, 7,500 Units at 11/14/24 0535    hydrALAZINE (APRESOLINE) tablet 10 mg, 10 mg, Oral, Q8H MIGEL, SAMANTHA Kern, 10 mg at 11/14/24 0535    labetalol (NORMODYNE) tablet 300 mg, 300 mg, Oral, BID, Alex S Ariel, DO, 300 mg at 11/13/24 2120    lactulose (CHRONULAC) oral solution 20 g, 20 g, Oral, TID PRN, Cathy Miranda MD, 20 g at 11/11/24 1707    loratadine (CLARITIN) tablet 5 mg, 5 mg, Oral, Daily, SAMANTHA Kern, 5 mg at 11/13/24 0820    melatonin tablet 3 mg, 3 mg, Oral, HS, SAMANTHA Kern, 3 mg at 11/13/24 2120    NIFEdipine (PROCARDIA XL) 24 hr tablet 60 mg, 60 mg, Oral, Daily, SAMANTHA Kern, 60 mg at 11/13/24 0820    OLANZapine (ZyPREXA) tablet 10 mg, 10 mg, Oral, HS, Alex George DO, 10 mg at 11/13/24 2120    ondansetron (ZOFRAN-ODT) dispersible tablet 4 mg, 4 mg, Oral, Q6H PRN, SAMANTHA Kern    pantoprazole (PROTONIX) EC tablet 40  "mg, 40 mg, Oral, Early Morning, Alex George DO, 40 mg at 11/14/24 0535    polyethylene glycol (MIRALAX) packet 17 g, 17 g, Oral, Daily, Cathy Miranda MD, 17 g at 11/13/24 0819    senna (SENOKOT) tablet 17.2 mg, 2 tablet, Oral, HS, Cathy Miranda MD, 17.2 mg at 11/13/24 2120    traZODone (DESYREL) tablet 50 mg, 50 mg, Oral, HS PRN, Alex George, DO, 50 mg at 11/13/24 2122    Valbenazine Tosylate CAPS 40 mg, 40 mg, Oral, HS, Alex George, DO, 40 mg at 11/13/24 2120    Laboratory Results:  Results from last 7 days   Lab Units 11/14/24  0530 11/12/24  0555 11/11/24  0548 11/08/24  0546   WBC Thousand/uL 5.38  --  6.15  --    HEMOGLOBIN g/dL 9.8*  --  8.7*  --    HEMATOCRIT % 28.4*  --  25.9*  --    PLATELETS Thousands/uL 184  --  159  --    SODIUM mmol/L 136 138 137 138   POTASSIUM mmol/L 4.7 4.5 4.7 4.7   CHLORIDE mmol/L 105 107 107 108   CO2 mmol/L 24 23 23 23   BUN mg/dL 40* 38* 43* 36*   CREATININE mg/dL 3.83* 3.78* 3.91* 3.68*   CALCIUM mg/dL 9.2 8.8 8.6 8.8   PHOSPHORUS mg/dL  --   --   --  4.5       Medical records have been reviewed through [facility] and care everywhere for this patient encounter    Portions of the record may have been created with voice recognition software. Occasional wrong word or \"sound a like\" substitutions may have occurred due to the inherent limitations of voice recognition software. Read the chart carefully and recognize,   "

## 2024-11-14 NOTE — ASSESSMENT & PLAN NOTE
Lab Results   Component Value Date    EGFR 16 11/14/2024    EGFR 16 11/12/2024    EGFR 15 11/11/2024    CREATININE 3.83 (H) 11/14/2024    CREATININE 3.78 (H) 11/12/2024    CREATININE 3.91 (H) 11/11/2024   Etiology: Progressive CKD the setting of diabetic nephropathy hypertensive nephrosclerosis  Follows with Dr. No outpatient and last seen on 10/8/2024  Previous baseline creatinine high 2 to low 3 range as outpatient  Need hospital follow-up on discharge for ongoing CKD management

## 2024-11-14 NOTE — ASSESSMENT & PLAN NOTE
Status post TNK administration  Management as per primary service and neurology.  Now in [facility] rehab

## 2024-11-14 NOTE — PROGRESS NOTES
11/14/24 8810   Pain Assessment   Pain Assessment Tool 0-10   Pain Score No Pain   Restrictions/Precautions   Precautions Bed/chair alarms;Cognitive;Fall Risk;Supervision on toilet/commode   Comprehension   Comprehension (FIM) 4 - Understands basic info/conversation 75-90% of time   Expression   Expression (FIM) 4 - Needs to repeat single words   Social Interaction   Social Interaction (FIM) 5 - Interacts appropriately with others 90% of time   Problem Solving   Problem solving (FIM) 4 - Solves basic problems 75-89% of time   Memory   Memory (FIM) 3 - Recognizes, recalls/performs 50-74%   Speech/Language/Cognition Assessment   Treatment Assessment Pt participated in skilled SLP session focusing on cognitive linguistic skills. Targeting working memory and sequencing, pt was verbally presented with Fo3 words. Pt recalled words and correctly sequenced words by progressing degree/order of occurrence in 9/16 trials. Pt required repetition of stimuli at times, which was appropriate for task. Regarding sequencing component of task, pt accurately sequenced 9/16 trials, benefiting from moderate semantic cues with occasionally requiring more max verbal cuing to complete sequences. Due to pt's ongoing vision difficulties, despite use of glasses and enlarged print, completed a drawing conclusion task in which pt was verbally provided with Fo4 words. Pt accurately identified words based on a given statement function/attribute (both concrete and abstract concepts) for 25/37 trials, mainly benefiting from repetition of stimuli, paired with min to moderate verbal cues to identify remaining words for the set. Throughout task, pt was also prompted to utilize memory/recall strategies such as repetition and verbal rehearsal.     At end of session, pt verbalized excitement to discharge this coming Monday and to see his grandchildren. When SLP inquired about how many grandchildren that he has, pt with inconsistent recall, stating  various numbers and relationships. PT benefited from SLP asking follow up questions and stating info aloud to clarify information. While overall speech intelligibility remained functional at the word and short phrase levels throughout session, pt presented with  speech intelligibility by end of session. Of note, SLP reviewed speech clarity strategies during session and pt reported that he did not recall those which were previously reviewed with him. At this time, pt is recommended for and will continue to benefit form further skilled SLP services focusing on overall cognitive linguistic skills and speech intelligibility skills in order to maximize independence on discharge.    SLP Therapy Minutes   SLP Time In 1330   SLP Time Out 1400   SLP Total Time (minutes) 30   SLP Mode of treatment - Individual (minutes) 30   SLP Mode of treatment - Concurrent (minutes) 0   SLP Mode of treatment - Group (minutes) 0   SLP Mode of treatment - Co-treat (minutes) 0   SLP Mode of Treatment - Total time(minutes) 30 minutes   SLP Cumulative Minutes 455   Therapy Time missed   Time missed? No

## 2024-11-14 NOTE — ASSESSMENT & PLAN NOTE
Lab Results   Component Value Date    HGBA1C 8.6 (H) 10/16/2024       Recent Labs     11/12/24  1606 11/13/24  0626 11/13/24  1635 11/14/24  0542   POCGLU 108 103 109 110       Blood Sugar Average: Last 72 hrs:  (P) [id number]    Had not been taking anything at home.  Ran out of Ozempic a few months ago.  BG has been well controlled.  Continue cons. carb diet and BID accuchecks.

## 2024-11-14 NOTE — ASSESSMENT & PLAN NOTE
Lab Results   Component Value Date    EGFR 16 11/14/2024    EGFR 16 11/12/2024    EGFR 15 11/11/2024    CREATININE 3.83 (H) 11/14/2024    CREATININE 3.78 (H) 11/12/2024    CREATININE 3.91 (H) 11/11/2024     Creatinine currently 3.83 from 3.78.  Baseline creatinine 2.5-2.9.    Avoid nephrotoxins.  Home diuretics currently on hold.  Encourage hydration.  Renal ultrasound demonstrates no hydro but mild bladder wall thickening.   Nephrology following.  Labs MWF per Nephro.  Follows with Dr. No (Nephrology) as outpatient.

## 2024-11-14 NOTE — ASSESSMENT & PLAN NOTE
Current hemoglobin is 9.8 as of 11/14/2024 and appears stable  Status post Venofer infusion with last dose on 11/8/2024  KOBI relatively contraindicated given recent CVA.  Hgb remains below goal  Transfuse as needed per primary team  Previous workup  Stool for occult blood negative  SPEP negative for monoclonal gammopathy  Iron saturation 38% with a ferritin of 111.

## 2024-11-14 NOTE — ASSESSMENT & PLAN NOTE
Continue Ingrezza 40 mg at bedtime  Per wife he gets the medicine from [place], requested that she obtain more as he is running out

## 2024-11-14 NOTE — ASSESSMENT & PLAN NOTE
Lab Results   Component Value Date    HGBA1C 8.6 (H) 10/16/2024   Continue on insulin  most recent A1c 8.6% as of October 2024  Advised of tight glycemic control for hemoglobin less than 7.0 to slow the progression of CKD  Management per primary team

## 2024-11-15 PROBLEM — E83.42 HYPOMAGNESEMIA: Status: ACTIVE | Noted: 2024-11-15

## 2024-11-15 LAB
25(OH)D3 SERPL-MCNC: <7 NG/ML (ref 30–100)
ANION GAP SERPL CALCULATED.3IONS-SCNC: 7 MMOL/L (ref 4–13)
BUN SERPL-MCNC: 42 MG/DL (ref 5–25)
CALCIUM SERPL-MCNC: 9 MG/DL (ref 8.4–10.2)
CHLORIDE SERPL-SCNC: 106 MMOL/L (ref 96–108)
CO2 SERPL-SCNC: 24 MMOL/L (ref 21–32)
CREAT SERPL-MCNC: 3.94 MG/DL (ref 0.6–1.3)
GFR SERPL CREATININE-BSD FRML MDRD: 15 ML/MIN/1.73SQ M
GLUCOSE P FAST SERPL-MCNC: 80 MG/DL (ref 65–99)
GLUCOSE SERPL-MCNC: 121 MG/DL (ref 65–140)
GLUCOSE SERPL-MCNC: 205 MG/DL (ref 65–140)
GLUCOSE SERPL-MCNC: 80 MG/DL (ref 65–140)
GLUCOSE SERPL-MCNC: 86 MG/DL (ref 65–140)
MAGNESIUM SERPL-MCNC: 1.7 MG/DL (ref 1.9–2.7)
PHOSPHATE SERPL-MCNC: 5.1 MG/DL (ref 2.7–4.5)
POTASSIUM SERPL-SCNC: 4.8 MMOL/L (ref 3.5–5.3)
PTH-INTACT SERPL-MCNC: 59.8 PG/ML (ref 12–88)
SODIUM SERPL-SCNC: 137 MMOL/L (ref 135–147)

## 2024-11-15 PROCEDURE — 97530 THERAPEUTIC ACTIVITIES: CPT

## 2024-11-15 PROCEDURE — 97116 GAIT TRAINING THERAPY: CPT

## 2024-11-15 PROCEDURE — 82948 REAGENT STRIP/BLOOD GLUCOSE: CPT

## 2024-11-15 PROCEDURE — 99232 SBSQ HOSP IP/OBS MODERATE 35: CPT | Performed by: FAMILY MEDICINE

## 2024-11-15 PROCEDURE — 82306 VITAMIN D 25 HYDROXY: CPT | Performed by: NURSE PRACTITIONER

## 2024-11-15 PROCEDURE — 83735 ASSAY OF MAGNESIUM: CPT | Performed by: NURSE PRACTITIONER

## 2024-11-15 PROCEDURE — 99232 SBSQ HOSP IP/OBS MODERATE 35: CPT | Performed by: INTERNAL MEDICINE

## 2024-11-15 PROCEDURE — 94660 CPAP INITIATION&MGMT: CPT

## 2024-11-15 PROCEDURE — 92507 TX SP LANG VOICE COMM INDIV: CPT

## 2024-11-15 PROCEDURE — 97129 THER IVNTJ 1ST 15 MIN: CPT

## 2024-11-15 PROCEDURE — 84100 ASSAY OF PHOSPHORUS: CPT | Performed by: NURSE PRACTITIONER

## 2024-11-15 PROCEDURE — 97110 THERAPEUTIC EXERCISES: CPT

## 2024-11-15 PROCEDURE — 80048 BASIC METABOLIC PNL TOTAL CA: CPT | Performed by: NURSE PRACTITIONER

## 2024-11-15 PROCEDURE — 94003 VENT MGMT INPAT SUBQ DAY: CPT

## 2024-11-15 PROCEDURE — 83970 ASSAY OF PARATHORMONE: CPT | Performed by: NURSE PRACTITIONER

## 2024-11-15 RX ORDER — HYDRALAZINE HYDROCHLORIDE 10 MG/1
10 TABLET, FILM COATED ORAL EVERY 8 HOURS SCHEDULED
Qty: 90 TABLET | Refills: 0 | Status: SHIPPED | OUTPATIENT
Start: 2024-11-15 | End: 2024-11-21 | Stop reason: SDUPTHER

## 2024-11-15 RX ORDER — NIFEDIPINE 30 MG/1
60 TABLET, EXTENDED RELEASE ORAL DAILY
Qty: 60 TABLET | Refills: 0 | Status: SHIPPED | OUTPATIENT
Start: 2024-11-16 | End: 2024-11-21 | Stop reason: SDUPTHER

## 2024-11-15 RX ORDER — LABETALOL 300 MG/1
300 TABLET, FILM COATED ORAL 2 TIMES DAILY
Qty: 60 TABLET | Refills: 0 | Status: SHIPPED | OUTPATIENT
Start: 2024-11-15 | End: 2024-11-21

## 2024-11-15 RX ORDER — OLANZAPINE 10 MG/1
10 TABLET ORAL
Qty: 30 TABLET | Refills: 0 | Status: SHIPPED | OUTPATIENT
Start: 2024-11-15 | End: 2024-11-15

## 2024-11-15 RX ORDER — TRAZODONE HYDROCHLORIDE 50 MG/1
50 TABLET, FILM COATED ORAL
Qty: 30 TABLET | Refills: 0 | Status: SHIPPED | OUTPATIENT
Start: 2024-11-15 | End: 2024-11-15

## 2024-11-15 RX ORDER — NIFEDIPINE 30 MG/1
60 TABLET, EXTENDED RELEASE ORAL DAILY
Qty: 60 TABLET | Refills: 0 | Status: SHIPPED | OUTPATIENT
Start: 2024-11-16 | End: 2024-11-15

## 2024-11-15 RX ORDER — BUPROPION HYDROCHLORIDE 100 MG/1
100 TABLET ORAL 2 TIMES DAILY
Qty: 60 TABLET | Refills: 0 | Status: SHIPPED | OUTPATIENT
Start: 2024-11-15 | End: 2024-11-15

## 2024-11-15 RX ORDER — MAGNESIUM GLUCONATE 27 MG(500)
500 TABLET ORAL DAILY
Qty: 30 TABLET | Refills: 0 | Status: SHIPPED | OUTPATIENT
Start: 2024-11-16

## 2024-11-15 RX ORDER — ERGOCALCIFEROL 1.25 MG/1
50000 CAPSULE, LIQUID FILLED ORAL 3 TIMES WEEKLY
Status: DISCONTINUED | OUTPATIENT
Start: 2024-11-15 | End: 2024-11-18 | Stop reason: HOSPADM

## 2024-11-15 RX ORDER — OLANZAPINE 10 MG/1
10 TABLET ORAL
Qty: 30 TABLET | Refills: 0 | Status: SHIPPED | OUTPATIENT
Start: 2024-11-15 | End: 2024-12-20 | Stop reason: ALTCHOICE

## 2024-11-15 RX ORDER — HYDRALAZINE HYDROCHLORIDE 10 MG/1
10 TABLET, FILM COATED ORAL EVERY 8 HOURS SCHEDULED
Qty: 90 TABLET | Refills: 0 | Status: SHIPPED | OUTPATIENT
Start: 2024-11-15 | End: 2024-11-15

## 2024-11-15 RX ORDER — MAGNESIUM GLUCONATE 27 MG(500)
500 TABLET ORAL DAILY
Qty: 30 TABLET | Refills: 0 | Status: SHIPPED | OUTPATIENT
Start: 2024-11-16 | End: 2024-11-15

## 2024-11-15 RX ORDER — BUPROPION HYDROCHLORIDE 100 MG/1
100 TABLET ORAL 2 TIMES DAILY
Qty: 60 TABLET | Refills: 0 | Status: SHIPPED | OUTPATIENT
Start: 2024-11-15

## 2024-11-15 RX ORDER — TRAZODONE HYDROCHLORIDE 50 MG/1
50 TABLET, FILM COATED ORAL
Qty: 30 TABLET | Refills: 0 | Status: SHIPPED | OUTPATIENT
Start: 2024-11-15 | End: 2024-12-20 | Stop reason: ALTCHOICE

## 2024-11-15 RX ORDER — LABETALOL 300 MG/1
300 TABLET, FILM COATED ORAL 2 TIMES DAILY
Qty: 60 TABLET | Refills: 0 | Status: SHIPPED | OUTPATIENT
Start: 2024-11-15 | End: 2024-11-15

## 2024-11-15 RX ADMIN — PANTOPRAZOLE SODIUM 40 MG: 40 TABLET, DELAYED RELEASE ORAL at 05:16

## 2024-11-15 RX ADMIN — HEPARIN SODIUM 7500 UNITS: 5000 INJECTION INTRAVENOUS; SUBCUTANEOUS at 21:36

## 2024-11-15 RX ADMIN — ATORVASTATIN CALCIUM 40 MG: 40 TABLET, FILM COATED ORAL at 17:01

## 2024-11-15 RX ADMIN — HYDRALAZINE HYDROCHLORIDE 10 MG: 10 TABLET ORAL at 05:16

## 2024-11-15 RX ADMIN — FLUTICASONE PROPIONATE 1 SPRAY: 50 SPRAY, METERED NASAL at 08:04

## 2024-11-15 RX ADMIN — POLYETHYLENE GLYCOL 3350 17 G: 17 POWDER, FOR SOLUTION ORAL at 08:02

## 2024-11-15 RX ADMIN — DOCUSATE SODIUM 100 MG: 100 CAPSULE, LIQUID FILLED ORAL at 17:01

## 2024-11-15 RX ADMIN — NIFEDIPINE 60 MG: 30 TABLET, FILM COATED, EXTENDED RELEASE ORAL at 08:02

## 2024-11-15 RX ADMIN — ASPIRIN 81 MG: 81 TABLET, COATED ORAL at 08:03

## 2024-11-15 RX ADMIN — Medication 500 MG: at 08:21

## 2024-11-15 RX ADMIN — ERGOCALCIFEROL 50000 UNITS: 1.25 CAPSULE ORAL at 17:03

## 2024-11-15 RX ADMIN — VALBENAZINE 40 MG: 40 CAPSULE ORAL at 21:56

## 2024-11-15 RX ADMIN — LABETALOL HYDROCHLORIDE 300 MG: 100 TABLET, FILM COATED ORAL at 08:02

## 2024-11-15 RX ADMIN — LORATADINE 5 MG: 10 TABLET ORAL at 08:02

## 2024-11-15 RX ADMIN — HYDRALAZINE HYDROCHLORIDE 10 MG: 10 TABLET ORAL at 21:38

## 2024-11-15 RX ADMIN — HEPARIN SODIUM 7500 UNITS: 5000 INJECTION INTRAVENOUS; SUBCUTANEOUS at 13:00

## 2024-11-15 RX ADMIN — BUPROPION HYDROCHLORIDE 100 MG: 100 TABLET, FILM COATED ORAL at 08:03

## 2024-11-15 RX ADMIN — HEPARIN SODIUM 7500 UNITS: 5000 INJECTION INTRAVENOUS; SUBCUTANEOUS at 05:16

## 2024-11-15 RX ADMIN — MELATONIN TAB 3 MG 3 MG: 3 TAB at 21:40

## 2024-11-15 RX ADMIN — DOCUSATE SODIUM 100 MG: 100 CAPSULE, LIQUID FILLED ORAL at 08:03

## 2024-11-15 RX ADMIN — HYDRALAZINE HYDROCHLORIDE 10 MG: 10 TABLET ORAL at 13:01

## 2024-11-15 RX ADMIN — BUPROPION HYDROCHLORIDE 100 MG: 100 TABLET, FILM COATED ORAL at 17:01

## 2024-11-15 RX ADMIN — LABETALOL HYDROCHLORIDE 300 MG: 100 TABLET, FILM COATED ORAL at 21:39

## 2024-11-15 RX ADMIN — OLANZAPINE 10 MG: 2.5 TABLET, FILM COATED ORAL at 21:41

## 2024-11-15 NOTE — ASSESSMENT & PLAN NOTE
Not on phosphorus binders  Phosphorus 5.1 today  Vit D deficiency-vitamin D levels less than 7 on 10/7/2024-previously on vitamin D 50,000 units weekly  vitamin D and  PTH  pending- once reviewed further recommendations will be forthcoming  Placed on low phosphorus diet

## 2024-11-15 NOTE — ASSESSMENT & PLAN NOTE
Initially with hypertensive emergency- improved  Current blood pressure less variable and now 150s over 60s to 70s  Currently on : labetalol 300 mg p.o. twice daily, nifedipine 60 mg p.o. daily, hydralazine 10 mg p.o. every 8hr  His standing blood pressure is around 120/80, otherwise he has blood pressures in the 150's systolic range recent medication changes   will avoid tight blood pressure control while inpatient and rehab to prevent hypotension uptitrate hydralazine as needed  Continue with compression stockings and abdominal binder when out of bed

## 2024-11-15 NOTE — PROGRESS NOTES
Progress Note - Internal Medicine   Name: Tommie Taylor 59 y.o. male I MRN: [id number]  Unit/Bed#: -01 I Date of Admission: 10/26/2024   Date of Service: 11/15/2024 I Hospital Day: 20    Assessment & Plan  CVA (cerebral vascular accident) (HCC)  Initially presented with R sided weakness and R facial droop.   CTH and CTA negative for acute process.   MRI showed left corona radiata stroke.   Received TNK.    Completed 3 weeks of DAPT on 11/6.  Continue with ASA 81mg daily.  Continue statin.    Neurovascular checks Q shift.  Maintain normotension.    Follow up with Neurovascular in 4-6 weeks as outpatient.  Recommending Zio patch/Loop.     PT/OT/ST per primary team.     Complaints of worsening RLE weakness on 11/4 after therapies - CTH stable.  Noted to have orthostasis - recommend obtaining higher BP to prevent symptoms.  Bipolar I disorder, severe, current or most recent episode depressed, with psychotic features ([facility])  Currently on bupropion 100mg BID and olanzapine 10mg at HS with valbenazine tosylate 40mg for tardive dyskinesia per home regimen. Follow up outpatient with psychiatry.   GERD (gastroesophageal reflux disease)  EGD with GI 10/14, directed to continue with Protonix. Tums available PRN for dyspepsia.   Insomnia  Educate and encourage on sleep hygiene. Continue Trazodone at HS for sleep.   DAISY (obstructive sleep apnea)  Continue use of CPAP with home settings.  Does not use CPAP at home - currently broken.  Obtain overnight noc ox prior to discharge on Saturday to determine DME evaluation.  Follow-up with Sleep Medicine as outpatient.  Anemia, unspecified  Hgb currently 9.8.  Hemoglobin 12.1 on admission.   Most recent iron panel on 10/15: Iron Sat 38%, TIBC 204, Iron 77, and Ferritin 111.  No need for iron supplementation at this time.  FOBT 2 out of 3 negative.  3rd to be obtained.  Completed 3 doses of IV Venofer per Nephrology recs.  Continue to trend routine CBC.  Hyperlipidemia  Most  recent lipid panel 10/16/24: Cholesterol 154, triglycerides 120, HDL 40, LDL 90.  Continue Lipitor 40mg daily, increased from home 20mg daily.  Class 3 severe obesity due to excess calories with serious comorbidity and body mass index (BMI) of 40.0 to 44.9 in adult ([facility])  BMI 42.74 on admission. Encourage lifestyle modifications and provide support for nutritional teaching. Consult placed to nutrition services during acute course.   Tardive dyskinesia  Continue on home treatment Valbenazine Tosylate 40mg HS.   Hypertension  Presented with hypertensive emergency on initial hospitalization.   Concerns about medication non-compliance.  Home regimen: labetalol 100mg BID and amlodipine 10mg daily.  Currently on labetalol 300mg BID, Nifedipine 60mg daily, and hydralazine 10mg Q8 hours.  Nephrology following.   DM2 (diabetes mellitus, type 2) ([facility])  Lab Results   Component Value Date    HGBA1C 8.6 (H) 10/16/2024       Recent Labs     11/13/24  1635 11/14/24  0542 11/14/24  1605 11/15/24  0605   POCGLU 109 110 95 86       Blood Sugar Average: Last 72 hrs:  (P) [id number]    Had not been taking anything at home.  Ran out of Ozempic a few months ago.  BG has been well controlled.  Continue cons. carb diet and BID accuchecks.    Acute kidney injury superimposed on stage 4 chronic kidney disease ([facility])  Lab Results   Component Value Date    EGFR 15 11/15/2024    EGFR 16 11/14/2024    EGFR 16 11/12/2024    CREATININE 3.94 (H) 11/15/2024    CREATININE 3.83 (H) 11/14/2024    CREATININE 3.78 (H) 11/12/2024     Creatinine currently 3.94 from 3.83.  Baseline creatinine 2.5-2.9.    Avoid nephrotoxins.  Home diuretics currently on hold.  Encourage hydration.  Renal ultrasound demonstrates no hydro but mild bladder wall thickening.   Nephrology following.  Labs MWF per Nephro.  Follows with Dr. No (Nephrology) as outpatient.  Encephalopathy  Lethargy and encephalopathic symptoms noted during acute course.  B12 and head CT WNL.  No subsequent episodes noted.   EEG on 10/22 consistent with moderate nonspecific cerebral dysfunction, no seizure activity.  Continue to monitor behavior and symptoms for signs of changes.   Most recent MoCA was  in 2024.  Per Psych - if continues to have delirium consider discontinuing Wellbutrin.  Concerns DAISY could also be contributing to encephalopathy per acute care.  Vitamin D deficiency  Vitamin D level <7 on 10/7.  Continue home vitamin D 50,000u weekly.  Continue to follow-up with Nephrology as outpatient.  Orthostatic hypotension  Orthostatic + on .  Continue to monitor orthostatics.  Apply abdominal binder/TEDs as needed.  Encourage PO hydration.  Chronic kidney disease-mineral and bone disorder (CKD-MBD)  Phosphorus 5.1 on 11/15.  Started on low phosphorus diet.  PTH and Vitamin D pending.  Nephrology consulted and following.  Hypomagnesemia  Mg 1.7 on 11/15.  Started on magnesium gluconate 500mg daily per Nephro.    VTE Pharmacologic Prophylaxis:   Pharmacologic: Heparin  Mechanical VTE Prophylaxis in Place: Yes - sequential compression devices.    Current Length of Stay: 20 day(s)    Current Patient Status: Inpatient Rehab     Discharge Plan: As per primary team.    Code Status: Level 1 - Full Code    Subjective:   Pt examined while pt sitting in recliner in pt room.  Currently has no complaints.  Feels that his RUE strength has improved since being in rehab.  Denies any pain or tightness to the RUE.  Denies any lightheadedness, dizziness, headaches, SOB, palpitations, or CP.  Plans for discharge on Monday.  Feels ready for discharge.    Objective:     Vitals:   Temp (24hrs), Av.5 °F (36.4 °C), Min:96.6 °F (35.9 °C), Max:98.1 °F (36.7 °C)    Temp:  [96.6 °F (35.9 °C)-98.1 °F (36.7 °C)] 97.8 °F (36.6 °C)  HR:  [63-97] 76  Resp:  [18] 18  BP: (151-162)/(64-86) 157/86  SpO2:  [98 %-99 %] 99 %  Body mass index is 42.74 kg/m².     Review of Systems   Constitutional:  Negative for appetite  change, chills, fatigue and fever.   HENT:  Negative for trouble swallowing.    Eyes:  Negative for visual disturbance.   Respiratory:  Negative for cough, shortness of breath, wheezing and stridor.    Cardiovascular:  Negative for chest pain, palpitations and leg swelling.   Gastrointestinal:  Negative for abdominal distention, abdominal pain, constipation, diarrhea, nausea and vomiting.        LBM 11/11   Genitourinary:  Negative for difficulty urinating.   Musculoskeletal:  Negative for arthralgias, back pain and gait problem.   Neurological:  Positive for weakness (RUE strength, gradually improving since admission to [facility]). Negative for dizziness, light-headedness, numbness and headaches.   Psychiatric/Behavioral:  Negative for dysphoric mood and sleep disturbance. The patient is not nervous/anxious.         Input and Output Summary (last 24 hours):       Intake/Output Summary (Last 24 hours) at 11/15/2024 0914  Last data filed at 11/14/2024 1719  Gross per 24 hour   Intake 760 ml   Output 150 ml   Net 610 ml       Physical Exam:     Physical Exam  Vitals and nursing note reviewed.   Constitutional:       General: He is not in acute distress.     Appearance: Normal appearance. He is obese. He is not ill-appearing.   HENT:      Head: Normocephalic and atraumatic.   Cardiovascular:      Rate and Rhythm: Normal rate and regular rhythm.      Pulses: Normal pulses.      Heart sounds: Normal heart sounds. No murmur heard.     No friction rub.   Pulmonary:      Effort: Pulmonary effort is normal. No respiratory distress.      Breath sounds: Normal breath sounds. No wheezing or rhonchi.   Abdominal:      General: Abdomen is flat. Bowel sounds are normal. There is no distension.      Palpations: Abdomen is soft. There is no mass.      Tenderness: There is no abdominal tenderness. There is no guarding or rebound.      Hernia: No hernia is present.   Musculoskeletal:      Cervical back: Normal range of motion and neck  supple. No tenderness.      Right lower leg: Edema (+1 pitting edema) present.      Left lower leg: Edema (+1 pitting edema) present.   Skin:     General: Skin is warm and dry.      Capillary Refill: Capillary refill takes less than 2 seconds.   Neurological:      Mental Status: He is alert and oriented to person, place, and time.      Cranial Nerves: Dysarthria and facial asymmetry present.      Motor: Weakness (RUE strength 4/5) present.   Psychiatric:         Mood and Affect: Mood normal.         Behavior: Behavior normal.         Additional Data:     Labs:    Results from last 7 days   Lab Units 11/14/24  0530   WBC Thousand/uL 5.38   HEMOGLOBIN g/dL 9.8*   HEMATOCRIT % 28.4*   PLATELETS Thousands/uL 184   SEGS PCT % 53   LYMPHO PCT % 28   MONO PCT % 13*   EOS PCT % 4     Results from last 7 days   Lab Units 11/15/24  0522   SODIUM mmol/L 137   POTASSIUM mmol/L 4.8   CHLORIDE mmol/L 106   CO2 mmol/L 24   BUN mg/dL 42*   CREATININE mg/dL 3.94*   ANION GAP mmol/L 7   CALCIUM mg/dL 9.0   GLUCOSE RANDOM mg/dL 80         Results from last 7 days   Lab Units 11/15/24  0605 11/14/24  1605 11/14/24  0542 11/13/24  1635 11/13/24  0626 11/12/24  1606 11/12/24  0606 11/11/24  1618 11/11/24  0633 11/10/24  2103 11/10/24  1620 11/10/24  0609   POC GLUCOSE mg/dl 86 95 110 109 103 108 88 94 93 111 106 105               Labs reviewed    Imaging:    Imaging reviewed    Recent Cultures (last 7 days):           Last 24 Hours Medication List:   Current Facility-Administered Medications   Medication Dose Route Frequency Provider Last Rate    acetaminophen  650 mg Oral Q6H PRN Alex S George, DO      aspirin  81 mg Oral Daily Alex S George, DO      atorvastatin  40 mg Oral QPM Alex S George, DO      buPROPion  100 mg Oral BID Alex S George, DO      calcium carbonate  1,000 mg Oral BID PRN Alex S George, DO      docusate sodium  100 mg Oral BID Cathy Miranda MD      fluticasone  1 spray Each Nare Daily SAMANTHA Kern       heparin (porcine)  7,500 Units Subcutaneous Q8H MIGEL Alex S Ariel, DO      hydrALAZINE  10 mg Oral Q8H MIGEL Joe Garcia, SAMANTHA      labetalol  300 mg Oral BID Alexrobert George, DO      lactulose  20 g Oral TID PRN Cathy Miranda MD      loratadine  5 mg Oral Daily Joe Garcia, CRNP      magnesium gluconate  500 mg Oral Daily Anahi O'Rufino, CRNP      melatonin  3 mg Oral HS Joe Garcia, CRNP      NIFEdipine  60 mg Oral Daily Joe Garcia, CRNP      OLANZapine  10 mg Oral HS Alexrobert George, DO      ondansetron  4 mg Oral Q6H PRN Joe Garcia, SAMANTHA      pantoprazole  40 mg Oral Early Morning Alexrobert George, DO      polyethylene glycol  17 g Oral Daily Cathy Miranda MD      senna  2 tablet Oral HS Cathy Miranda MD      traZODone  50 mg Oral HS PRN Alex George, DO      Valbenazine Tosylate  40 mg Oral HS Alex LUIS George, DO          M*Modal software was used to dictate this note.  It may contain errors with dictating incorrect words or incorrect spelling. Please contact the provider directly with any questions.

## 2024-11-15 NOTE — ASSESSMENT & PLAN NOTE
Lab Results   Component Value Date    EGFR 15 11/15/2024    EGFR 16 11/14/2024    EGFR 16 11/12/2024    CREATININE 3.94 (H) 11/15/2024    CREATININE 3.83 (H) 11/14/2024    CREATININE 3.78 (H) 11/12/2024   Etiology: Progressive CKD the setting of diabetic nephropathy hypertensive nephrosclerosis  Follows with Dr. No outpatient and last seen on 10/8/2024  Previous baseline creatinine high 2 to low 3 range as outpatient now in the mid to high 3 range  Need hospital follow-up on discharge for ongoing CKD management

## 2024-11-15 NOTE — PROGRESS NOTES
Progress Note - Nephrology   Name: Tommie Taylor 59 y.o. male I MRN: [id number]  Unit/Bed#: -01 I Date of Admission: 10/26/2024   Date of Service: 11/15/2024 I Hospital Day: 20     Assessment & Plan  Acute kidney injury superimposed on stage 4 chronic kidney disease (HCC)  Etiology: Suspect secondary to mild dynamic changes in the setting of uncontrolled hypertension, autoregulatory failure and ATN  Peak creatinine 4.57 on 10/21/2024  Previous baseline fluctuated between high 2 and low 3 range as outpatient  ROSINA has improved and likely new baseline mid 3 to high 3 range  Current creatinine today 3.94 -fairly stable but has slightly increased daily  Home diuretics on hold  Encouraged hydration  Noted nephrotic range proteinuria underlying disease most likely secondary to diabetic kidney disease.  Okay to follow every MWF blood work schedule while in rehab  Chronic kidney disease  Lab Results   Component Value Date    EGFR 15 11/15/2024    EGFR 16 11/14/2024    EGFR 16 11/12/2024    CREATININE 3.94 (H) 11/15/2024    CREATININE 3.83 (H) 11/14/2024    CREATININE 3.78 (H) 11/12/2024   Etiology: Progressive CKD the setting of diabetic nephropathy hypertensive nephrosclerosis  Follows with Dr. No outpatient and last seen on 10/8/2024  Previous baseline creatinine high 2 to low 3 range as outpatient now in the mid to high 3 range  Need hospital follow-up on discharge for ongoing CKD management  Hypertension  Initially with hypertensive emergency- improved  Current blood pressure less variable and now 150s over 60s to 70s  Currently on : labetalol 300 mg p.o. twice daily, nifedipine 60 mg p.o. daily, hydralazine 10 mg p.o. every 8hr  His standing blood pressure is around 120/80, otherwise he has blood pressures in the 150's systolic range recent medication changes   will avoid tight blood pressure control while inpatient and rehab to prevent hypotension uptitrate hydralazine as needed  Continue with compression  stockings and abdominal binder when out of bed  Orthostatic hypotension   Will avoid overaggressive BP management  Continue with abdominal binder and compression stockings  Chronic kidney disease-mineral and bone disorder (CKD-MBD)  Not on phosphorus binders  Phosphorus 5.1 today  Vit D deficiency-vitamin D levels less than 7 on 10/7/2024-previously on vitamin D 50,000 units weekly  vitamin D and  PTH  pending- once reviewed further recommendations will be forthcoming  Placed on low phosphorus diet  Hypomagnesemia  Current magnesium 1.7  Will start magnesium gluconate 500 mg daily  Anemia, unspecified  Current hemoglobin is 9.8 as of 11/14/2024 and appears stable  Status post Venofer infusion with last dose on 11/8/2024  KOBI relatively contraindicated given recent CVA.  Hgb remains below goal  Transfuse as needed per primary team  Previous workup  Stool for occult blood negative  SPEP negative for monoclonal gammopathy  Iron saturation 38% with a ferritin of 111.  CVA (cerebral vascular accident) ([facility])  Status post TNK administration  Management as per primary service and neurology.  Now in [facility] rehab  Bipolar I disorder, severe, current or most recent episode depressed, with psychotic features ([facility])  Follow-up with psych  Currently on Zyprexa and Wellbutrin  DM2 (diabetes mellitus, type 2) ([facility])  Lab Results   Component Value Date    HGBA1C 8.6 (H) 10/16/2024   Continue on insulin  most recent A1c 8.6% as of October 2024  Advised of tight glycemic control for hemoglobin less than 7.0 to slow the progression of CKD  Management per primary team  Impaired mobility and activities of daily living  Currently with wheelchair  Follow-up with [facility] rehab  Hyperlipidemia  Continue on atorvastatin  Goal LDL less than 70  Management per primary team    Overall plan and recommendations has been reviewed with primary team and I agree with the plan as stated below  Continue to hold diuretics for patient examining fairly euvolemic to  the dry side  Encourage hydration  We will start magnesium gluconate 500 mg for hypomagnesia  Placed on low phosphorus diet for elevated phosphorus  Monitor vitamin D and PTH today-reviewed further recommendations will be forthcoming  Okay to monitor BMP, mg and phos every MWF while in rehab  Continue to use compression stockings and abdominal binder once out of bed due to orthostatic hypotension  Nephrology will see Monday-please call nephrology over weekend if we could be of further assistance    Review of Systems  Patient seen and examined at bedside.  No acute events overnight.  Patient reports feeling okay but just tired.  Nuys chest pain, shortness of breath, dizziness laying flat  Review of Systems   Constitutional: Negative.  Negative for activity change, appetite change, chills, diaphoresis, fatigue and fever.   HENT: Negative.  Negative for congestion and facial swelling.    Respiratory: Negative.     Cardiovascular: Negative.    Gastrointestinal: Negative.    Endocrine: Negative.    Genitourinary: Negative.    Musculoskeletal: Negative.    Skin: Negative.    Allergic/Immunologic: Negative.    Neurological: Negative.    Hematological: Negative.    Psychiatric/Behavioral: Negative.           Physical Exam:  Current Weight: Weight - Scale: 120 kg (264 lb 12.8 oz)  Vitals:    11/14/24 2013 11/14/24 2119 11/14/24 2140 11/15/24 0516   BP: 151/84 158/64  153/70   BP Location: Left arm   Left arm   Pulse: 77 78  75   Resp: 18   18   Temp: 98.1 °F (36.7 °C)   97.8 °F (36.6 °C)   TempSrc: Tympanic   Tympanic   SpO2: 98%  98% 99%   Weight:       Height:           Physical Exam  Vitals and nursing note reviewed.   Constitutional:       Appearance: Normal appearance.   HENT:      Head: Normocephalic and atraumatic.      Nose: Nose normal.      Mouth/Throat:      Mouth: Mucous membranes are dry.      Pharynx: Oropharynx is clear.   Eyes:      Extraocular Movements: Extraocular movements intact.      Conjunctiva/sclera:  Conjunctivae normal.   Cardiovascular:      Rate and Rhythm: Normal rate and regular rhythm.      Pulses: Normal pulses.      Heart sounds: Normal heart sounds.   Pulmonary:      Effort: Pulmonary effort is normal.      Breath sounds: Normal breath sounds.   Abdominal:      General: Bowel sounds are normal.      Palpations: Abdomen is soft.   Musculoskeletal:         General: Normal range of motion.      Cervical back: Normal range of motion and neck supple.   Skin:     General: Skin is warm and dry.   Neurological:      Mental Status: He is alert and oriented to person, place, and time.   Psychiatric:         Mood and Affect: Mood normal.         Behavior: Behavior normal.            Medications:    Current Facility-Administered Medications:     acetaminophen (TYLENOL) tablet 650 mg, 650 mg, Oral, Q6H PRN, Alex S Ariel, DO, 650 mg at 11/07/24 0737    aspirin (ECOTRIN LOW STRENGTH) EC tablet 81 mg, 81 mg, Oral, Daily, Alex S Ariel, DO, 81 mg at 11/14/24 0819    atorvastatin (LIPITOR) tablet 40 mg, 40 mg, Oral, QPM, Alex George, DO, 40 mg at 11/14/24 1705    buPROPion (WELLBUTRIN) tablet 100 mg, 100 mg, Oral, BID, Alex S Ariel, DO, 100 mg at 11/14/24 1705    calcium carbonate (TUMS) chewable tablet 1,000 mg, 1,000 mg, Oral, BID PRN, Alexrobert George, DO    docusate sodium (COLACE) capsule 100 mg, 100 mg, Oral, BID, Cathy Miranda MD, 100 mg at 11/14/24 1705    fluticasone (FLONASE) 50 mcg/act nasal spray 1 spray, 1 spray, Each Nare, Daily, SAMANTHA Kern, 1 spray at 11/14/24 0819    heparin (porcine) subcutaneous injection 7,500 Units, 7,500 Units, Subcutaneous, Q8H MIGEL, Alex George DO, 7,500 Units at 11/15/24 0516    hydrALAZINE (APRESOLINE) tablet 10 mg, 10 mg, Oral, Q8H MIGEL, SAMANTHA Kern, 10 mg at 11/15/24 0516    labetalol (NORMODYNE) tablet 300 mg, 300 mg, Oral, BID, Alex George DO, 300 mg at 11/14/24 2119    lactulose (CHRONULAC) oral solution 20 g, 20 g, Oral, TID PRN, Cathy  MD Ruben, 20 g at 11/11/24 1707    loratadine (CLARITIN) tablet 5 mg, 5 mg, Oral, Daily, SAMANTHA Kern, 5 mg at 11/14/24 0818    magnesium gluconate (MAGONATE) tablet 500 mg, 500 mg, Oral, Daily, SAMANTHA Amaro    melatonin tablet 3 mg, 3 mg, Oral, HS, SAMANTHA Kern, 3 mg at 11/14/24 2121    NIFEdipine (PROCARDIA XL) 24 hr tablet 60 mg, 60 mg, Oral, Daily, SAMANTHA Kern, 60 mg at 11/14/24 0818    OLANZapine (ZyPREXA) tablet 10 mg, 10 mg, Oral, HS, Alex George DO, 10 mg at 11/14/24 2119    ondansetron (ZOFRAN-ODT) dispersible tablet 4 mg, 4 mg, Oral, Q6H PRN, SAMANTHA Kern    pantoprazole (PROTONIX) EC tablet 40 mg, 40 mg, Oral, Early Morning, Alex George DO, 40 mg at 11/15/24 0516    polyethylene glycol (MIRALAX) packet 17 g, 17 g, Oral, Daily, Cathy Miranda MD, 17 g at 11/14/24 0818    senna (SENOKOT) tablet 17.2 mg, 2 tablet, Oral, HS, Cathy Miranda MD, 17.2 mg at 11/14/24 2119    traZODone (DESYREL) tablet 50 mg, 50 mg, Oral, HS PRN, Alex George DO, 50 mg at 11/13/24 2122    Valbenazine Tosylate CAPS 40 mg, 40 mg, Oral, HS, Alex George DO, 40 mg at 11/14/24 2124    Laboratory Results:  Results from last 7 days   Lab Units 11/15/24  0522 11/14/24  0530 11/12/24  0555 11/11/24  0548   WBC Thousand/uL  --  5.38  --  6.15   HEMOGLOBIN g/dL  --  9.8*  --  8.7*   HEMATOCRIT %  --  28.4*  --  25.9*   PLATELETS Thousands/uL  --  184  --  159   SODIUM mmol/L 137 136 138 137   POTASSIUM mmol/L 4.8 4.7 4.5 4.7   CHLORIDE mmol/L 106 105 107 107   CO2 mmol/L 24 24 23 23   BUN mg/dL 42* 40* 38* 43*   CREATININE mg/dL 3.94* 3.83* 3.78* 3.91*   CALCIUM mg/dL 9.0 9.2 8.8 8.6   MAGNESIUM mg/dL 1.7*  --   --   --    PHOSPHORUS mg/dL 5.1*  --   --   --        Medical records have been reviewed through [facility] and care everywhere for this patient encounter    Portions of the record may have been created with voice recognition software. Occasional wrong word  "or \"sound a like\" substitutions may have occurred due to the inherent limitations of voice recognition software. Read the chart carefully and recognize,   "

## 2024-11-15 NOTE — ASSESSMENT & PLAN NOTE
Will avoid overaggressive BP management  Continue with abdominal binder and compression stockings

## 2024-11-15 NOTE — DISCHARGE INSTR - AVS FIRST PAGE
DISCHARGE INSTRUCTIONS: [facility] ACUTE REHABILIT[facility] [place]    Bring these instructions with you to your Outpatient Physician appointments so they can order and follow-up any additional lab work or imaging recommended at time of discharge.    Resume follow-up with all your prior providers that you have established care prior to this hospitalization.  Discuss with primary care physician (PCP) if you have additional questions.     It  is you or your caregivers responsibility to obtain follow-up MEDICATION REFILLS  As indicated through your Primary Care Physician (PCP) and other outpatient specialty provider(s) after discharge.  Please follow-up with your PCP as soon as possible after discharge to set-up follow-up management and when appropriate refills.      You have been determined to have some cognitive impairments. - It is YOUR CAREGIVER'S RESPONSIBILITY to ensure appropriate follow-up which includes:  - APPOINTMENTS are scheduled and safe transportation is arranged  - LABS and IMAGING are completed  - MEDICATION MANAGEMENT at home is carried out appropriately     You remain a fall and injury risk which could be severe.  - Your risk of fall has decreased however since admission to acute rehab.  Caregiver training has been completed with our staff.  - Appropriate supervision +/- assistance as instructed during your rehab course is recommended to decrease risk of fall and injury.    - Continue skilled therapy as discussed after discharge to further decrease this risk    If you (or your health care proxy) have any questions or concerns regarding your acute rehabilitation stay including issues with medications, rehabilitation, and follow-up plan, please call:          [facility] Acute Rehabilitation [facility] at [place] at [phone]      Should you develop fevers, chills, new weakness, changes in sensation, difficulty speaking, facial weakness, confusion, shortness of breath, chest pain, or  other concerning symptoms please call 911 and/or obtain transportation to nearest ER immediately.      PHYSICIANS to see:  Please see your doctors listed in the follow up providers section of your discharge paperwork, and take the discharge paperwork with you to your appointments.    Home health has been ordered for you:  Your home health agency should reach out to you or your family soon to arrange follow-up.    (If [facility] health is your provider their phone number is [phone])    If you are unable to get in touch with home health you may contact your  at [phone]. [place]      SKIN CARE INSTRUCTIONS to follow:  Monitor incision(s) for increased redness, swelling, pain/tenderness, discharge/pus and promptly notify your surgeon should these develop.  - Should you develop significant pain, swelling, or drainage obtain transportation to nearest emergency room for immediate evaluation if unable to reach your surgeon promptly   - Should you develop uncontrolled pain, fever, chills, sweats, changes in strength, sensation, or color of this area obtain transportation to nearest emergency room for immediate evaluation.      Monitor skin for increased redness or breadown and promptly notify your physician should these develop    If instructed while in [facility] - be sure you stand +/- walk every 1-2 hours and if advised use appropriate supervision/assistance to optimally offload your buttock/sacral region.  While seated or lying in bed shift positions and from side to side often.  Can use barrier type cream such as Hydragaurd 2 times per day and as needed.    Turn patient (yourself) fully every 2 hours while in bed.        Driving restrictions:  You are recommended against driving.      ------------------------------------------------------------------------------------------------------------  ------------------------------------------------------------------------------------------------------------    MEDICATIONS:  Please see a full list of your medications outlined in the After Visit Summary that is attached to these Discharge Instructions.  Please note changes may have been made to your medications please refer to your discharge paperwork for your current medications and take this list with you to all your doctors appointments for your doctors to review.  Please do not resume a home medication unless the medication reconciliation sheet indicates to do so, please do not assume that a medication that you were given a prescription for is the same as a medication you have at home based on both medications having the same name as dosages and frequency may have changed.      Unless specifically noted in your medication list provided to you in your discharge paper work do not resume prior vitamins, minerals, or supplements you may have been taking prior to your hospitalization unless instructed by an outpatient physician in the future.  Discuss with your primary care at next visit if applicable.      ------------------------------------------------------------------------------------------------------------  ------------------------------------------------------------------------------------------------------------    MEDICAL MANAGEMENT AT HOME specific to you:      Bowel management (constipation risk):  - Ideally you would have 1 well formed BM every 1-2 days.  Adjust bowel regimen based on that goal or as close to that as possible  - Start with taking miralax 1 time per day and senna twice daily if you become constipated   - You may also add Colace 100mg twice daily as a stool softener and Senna/Senakot 1-2 tabs daily  - If still constipated you can increase miralax to twice per day and if  needed take either oral or by rectum dulcolax (but not at the same time)  - If unable to go for more than 4 days notify your physician for additional recommendations    - If you develop significant abdominal pain, nausea/vomiting, or other concerns seek medical attention right away.          Please note a summary of your hospital stay with relevant information for your doctors will try to be sent to them.  Please confirm with your doctors at your follow up visits that they have received this summary and have them contact [facility] Medical Records if they have not received them along with any other medical records they may require.     [facility] Phone Number:  [phone]

## 2024-11-15 NOTE — PLAN OF CARE
Problem: PAIN - ADULT  Goal: Verbalizes/displays adequate comfort level or baseline comfort level  Description: Interventions:  - Encourage patient to monitor pain and request assistance  - Assess pain using appropriate pain scale  - Administer analgesics based on type and severity of pain and evaluate response  - Implement non-pharmacological measures as appropriate and evaluate response  - Consider cultural and social influences on pain and pain management  - Notify physician/advanced practitioner if interventions unsuccessful or patient reports new pain  Outcome: Progressing     Problem: INFECTION - ADULT  Goal: Absence or prevention of progression during hospitalization  Description: INTERVENTIONS:  - Assess and monitor for signs and symptoms of infection  - Monitor lab/diagnostic results  - Monitor all insertion sites, i.e. indwelling lines, tubes, and drains  - Monitor endotracheal if appropriate and nasal secretions for changes in amount and color  - Lesage appropriate cooling/warming therapies per order  - Administer medications as ordered  - Instruct and encourage patient and family to use good hand hygiene technique  - Identify and instruct in appropriate isolation precautions for identified infection/condition  Outcome: Progressing  Goal: Absence of fever/infection during neutropenic period  Description: INTERVENTIONS:  - Monitor WBC    Outcome: Progressing     Problem: SAFETY ADULT  Goal: Patient will remain free of falls  Description: INTERVENTIONS:  - Educate patient/family on patient safety including physical limitations  - Instruct patient to call for assistance with activity   - Consult OT/PT to assist with strengthening/mobility   - Keep Call bell within reach  - Keep bed low and locked with side rails adjusted as appropriate  - Keep care items and personal belongings within reach  - Initiate and maintain comfort rounds  - Make Fall Risk Sign visible to staff  - Offer Toileting every 2 Hours,  in advance of need  - Initiate/Maintain bed alarm  - Obtain necessary fall risk management equipment: bed alarm  - Apply yellow socks and bracelet for high fall risk patients  - Consider moving patient to room near nurses station  Outcome: Progressing  Goal: Maintain or return to baseline ADL function  Description: INTERVENTIONS:  -  Assess patient's ability to carry out ADLs; assess patient's baseline for ADL function and identify physical deficits which impact ability to perform ADLs (bathing, care of mouth/teeth, toileting, grooming, dressing, etc.)  - Assess/evaluate cause of self-care deficits   - Assess range of motion  - Assess patient's mobility; develop plan if impaired  - Assess patient's need for assistive devices and provide as appropriate  - Encourage maximum independence but intervene and supervise when necessary  - Involve family in performance of ADLs  - Assess for home care needs following discharge   - Consider OT consult to assist with ADL evaluation and planning for discharge  - Provide patient education as appropriate  Outcome: Progressing  Goal: Maintains/Returns to pre admission functional level  Description: INTERVENTIONS:  - Perform AM-PAC 6 Click Basic Mobility/ Daily Activity assessment daily.  - Set and communicate daily mobility goal to care team and patient/family/caregiver.   - Collaborate with rehabilitation services on mobility goals if consulted  - Perform Range of Motion 2 times a day.  - Reposition patient every 2 hours.  - Dangle patient 2 times a day  - Stand patient 2 times a day  - Ambulate patient 2 times a day  - Out of bed to chair 2 times a day   - Out of bed for meals 2 times a day  - Out of bed for toileting  - Record patient progress and toleration of activity level   Outcome: Progressing     Problem: DISCHARGE PLANNING  Goal: Discharge to home or other facility with appropriate resources  Description: INTERVENTIONS:  - Identify barriers to discharge w/patient and  caregiver  - Arrange for needed discharge resources and transportation as appropriate  - Identify discharge learning needs (meds, wound care, etc.)  - Arrange for interpretive services to assist at discharge as needed  - Refer to Case Management Department for coordinating discharge planning if the patient needs post-hospital services based on physician/advanced practitioner order or complex needs related to functional status, cognitive ability, or social support system  Outcome: Progressing     Problem: Knowledge Deficit  Goal: Patient/family/caregiver demonstrates understanding of disease process, treatment plan, medications, and discharge instructions  Description: Complete learning assessment and assess knowledge base.  Interventions:  - Provide teaching at level of understanding  - Provide teaching via preferred learning methods  Outcome: Progressing     Problem: NEUROSENSORY - ADULT  Goal: Achieves stable or improved neurological status  Description: INTERVENTIONS  - Monitor and report changes in neurological status  - Monitor vital signs such as temperature, blood pressure, glucose, and any other labs ordered   - Initiate measures to prevent increased intracranial pressure  - Monitor for seizure activity and implement precautions if appropriate      Outcome: Progressing  Goal: Remains free of injury related to seizures activity  Description: INTERVENTIONS  - Maintain airway, patient safety  and administer oxygen as ordered  - Monitor patient for seizure activity, document and report duration and description of seizure to physician/advanced practitioner  - If seizure occurs,  ensure patient safety during seizure  - Reorient patient post seizure  - Seizure pads on all 4 side rails  - Instruct patient/family to notify RN of any seizure activity including if an aura is experienced  - Instruct patient/family to call for assistance with activity based on nursing assessment  - Administer anti-seizure medications if  ordered    Outcome: Progressing  Goal: Achieves maximal functionality and self care  Description: INTERVENTIONS  - Monitor swallowing and airway patency with patient fatigue and changes in neurological status  - Encourage and assist patient to increase activity and self care.   - Encourage visually impaired, hearing impaired and aphasic patients to use assistive/communication devices  Outcome: Progressing     Problem: CARDIOVASCULAR - ADULT  Goal: Maintains optimal cardiac output and hemodynamic stability  Description: INTERVENTIONS:  - Monitor I/O, vital signs and rhythm  - Monitor for S/S and trends of decreased cardiac output  - Administer and titrate ordered vasoactive medications to optimize hemodynamic stability  - Assess quality of pulses, skin color and temperature  - Assess for signs of decreased coronary artery perfusion  - Instruct patient to report change in severity of symptoms  Outcome: Progressing  Goal: Absence of cardiac dysrhythmias or at baseline rhythm  Description: INTERVENTIONS:  - Continuous cardiac monitoring, vital signs, obtain 12 lead EKG if ordered  - Administer antiarrhythmic and heart rate control medications as ordered  - Monitor electrolytes and administer replacement therapy as ordered  Outcome: Progressing     Problem: RESPIRATORY - ADULT  Goal: Achieves optimal ventilation and oxygenation  Description: INTERVENTIONS:  - Assess for changes in respiratory status  - Assess for changes in mentation and behavior  - Position to facilitate oxygenation and minimize respiratory effort  - Oxygen administered by appropriate delivery if ordered  - Initiate smoking cessation education as indicated  - Encourage broncho-pulmonary hygiene including cough, deep breathe, Incentive Spirometry  - Assess the need for suctioning and aspirate as needed  - Assess and instruct to report SOB or any respiratory difficulty  - Respiratory Therapy support as indicated  Outcome: Progressing     Problem:  METABOLIC, FLUID AND ELECTROLYTES - ADULT  Goal: Electrolytes maintained within normal limits  Description: INTERVENTIONS:  - Monitor labs and assess patient for signs and symptoms of electrolyte imbalances  - Administer electrolyte replacement as ordered  - Monitor response to electrolyte replacements, including repeat lab results as appropriate  - Instruct patient on fluid and nutrition as appropriate  Outcome: Progressing  Goal: Fluid balance maintained  Description: INTERVENTIONS:  - Monitor labs   - Monitor I/O and WT  - Instruct patient on fluid and nutrition as appropriate  - Assess for signs & symptoms of volume excess or deficit  Outcome: Progressing  Goal: Glucose maintained within target range  Description: INTERVENTIONS:  - Monitor Blood Glucose as ordered  - Assess for signs and symptoms of hyperglycemia and hypoglycemia  - Administer ordered medications to maintain glucose within target range  - Assess nutritional intake and initiate nutrition service referral as needed  Outcome: Progressing     Problem: MUSCULOSKELETAL - ADULT  Goal: Maintain or return mobility to safest level of function  Description: INTERVENTIONS:  - Assess patient's ability to carry out ADLs; assess patient's baseline for ADL function and identify physical deficits which impact ability to perform ADLs (bathing, care of mouth/teeth, toileting, grooming, dressing, etc.)  - Assess/evaluate cause of self-care deficits   - Assess range of motion  - Assess patient's mobility  - Assess patient's need for assistive devices and provide as appropriate  - Encourage maximum independence but intervene and supervise when necessary  - Involve family in performance of ADLs  - Assess for home care needs following discharge   - Consider OT consult to assist with ADL evaluation and planning for discharge  - Provide patient education as appropriate  Outcome: Progressing     Problem: MUSCULOSKELETAL - ADULT  Goal: Maintain proper alignment of affected  body part  Description: INTERVENTIONS:  - Support, maintain and protect limb and body alignment  - Provide patient/ family with appropriate education  Outcome: Progressing     Problem: MOBILITY - ADULT  Goal: Maintain or return to baseline ADL function  Description: INTERVENTIONS:  -  Assess patient's ability to carry out ADLs; assess patient's baseline for ADL function and identify physical deficits which impact ability to perform ADLs (bathing, care of mouth/teeth, toileting, grooming, dressing, etc.)  - Assess/evaluate cause of self-care deficits   - Assess range of motion  - Assess patient's mobility; develop plan if impaired  - Assess patient's need for assistive devices and provide as appropriate  - Encourage maximum independence but intervene and supervise when necessary  - Involve family in performance of ADLs  - Assess for home care needs following discharge   - Consider OT consult to assist with ADL evaluation and planning for discharge  - Provide patient education as appropriate  Outcome: Progressing  Goal: Maintains/Returns to pre admission functional level  Description: INTERVENTIONS:  - Perform AM-PAC 6 Click Basic Mobility/ Daily Activity assessment daily.  - Set and communicate daily mobility goal to care team and patient/family/caregiver.   - Collaborate with rehabilitation services on mobility goals if consulted  - Perform Range of Motion 2 times a day.  - Reposition patient every 2 hours.  - Dangle patient 2 times a day  - Stand patient 2 times a day  - Ambulate patient 2 times a day  - Out of bed to chair 2 times a day   - Out of bed for meals 2 times a day  - Out of bed for toileting  - Record patient progress and toleration of activity level   Outcome: Progressing     Problem: MOBILITY - ADULT  Goal: Maintain or return to baseline ADL function  Description: INTERVENTIONS:  -  Assess patient's ability to carry out ADLs; assess patient's baseline for ADL function and identify physical deficits  which impact ability to perform ADLs (bathing, care of mouth/teeth, toileting, grooming, dressing, etc.)  - Assess/evaluate cause of self-care deficits   - Assess range of motion  - Assess patient's mobility; develop plan if impaired  - Assess patient's need for assistive devices and provide as appropriate  - Encourage maximum independence but intervene and supervise when necessary  - Involve family in performance of ADLs  - Assess for home care needs following discharge   - Consider OT consult to assist with ADL evaluation and planning for discharge  - Provide patient education as appropriate  Outcome: Progressing  Goal: Maintains/Returns to pre admission functional level  Description: INTERVENTIONS:  - Perform AM-PAC 6 Click Basic Mobility/ Daily Activity assessment daily.  - Set and communicate daily mobility goal to care team and patient/family/caregiver.   - Collaborate with rehabilitation services on mobility goals if consulted  - Perform Range of Motion 2 times a day.  - Reposition patient every 2 hours.  - Dangle patient 2 times a day  - Stand patient 2 times a day  - Ambulate patient 2 times a day  - Out of bed to chair 2 times a day   - Out of bed for meals 2 times a day  - Out of bed for toileting  - Record patient progress and toleration of activity level   Outcome: Progressing     Problem: Potential for Falls  Goal: Patient will remain free of falls  Description: INTERVENTIONS:  - Educate patient/family on patient safety including physical limitations  - Instruct patient to call for assistance with activity   - Consult OT/PT to assist with strengthening/mobility   - Keep Call bell within reach  - Keep bed low and locked with side rails adjusted as appropriate  - Keep care items and personal belongings within reach  - Initiate and maintain comfort rounds  - Make Fall Risk Sign visible to staff  - Offer Toileting every 2 Hours, in advance of need  - Initiate/Maintain bed alarm  - Obtain necessary fall  risk management equipment: bed alarm  - Apply yellow socks and bracelet for high fall risk patients  - Consider moving patient to room near nurses station  Outcome: Progressing     Problem: Prexisting or High Potential for Compromised Skin Integrity  Goal: Skin integrity is maintained or improved  Description: INTERVENTIONS:  - Identify patients at risk for skin breakdown  - Assess and monitor skin integrity  - Assess and monitor nutrition and hydration status  - Monitor labs   - Assess for incontinence   - Turn and reposition patient  - Assist with mobility/ambulation  - Relieve pressure over bony prominences  - Avoid friction and shearing  - Provide appropriate hygiene as needed including keeping skin clean and dry  - Evaluate need for skin moisturizer/barrier cream  - Collaborate with interdisciplinary team   - Patient/family teaching  - Consider wound care consult   Outcome: Progressing     Problem: GENITOURINARY - ADULT  Goal: Maintains or returns to baseline urinary function  Description: INTERVENTIONS:  - Assess urinary function  - Encourage oral fluids to ensure adequate hydration if ordered  - Administer IV fluids as ordered to ensure adequate hydration  - Administer ordered medications as needed  - Offer frequent toileting  - Follow urinary retention protocol if ordered  Outcome: Progressing

## 2024-11-15 NOTE — ASSESSMENT & PLAN NOTE
Lab Results   Component Value Date    EGFR 15 11/15/2024    EGFR 16 11/14/2024    EGFR 16 11/12/2024    CREATININE 3.94 (H) 11/15/2024    CREATININE 3.83 (H) 11/14/2024    CREATININE 3.78 (H) 11/12/2024     Creatinine currently 3.94 from 3.83.  Baseline creatinine 2.5-2.9.    Avoid nephrotoxins.  Home diuretics currently on hold.  Encourage hydration.  Renal ultrasound demonstrates no hydro but mild bladder wall thickening.   Nephrology following.  Labs MWF per Nephro.  Follows with Dr. No (Nephrology) as outpatient.

## 2024-11-15 NOTE — ASSESSMENT & PLAN NOTE
Phosphorus 5.1 on 11/15.  Started on low phosphorus diet.  PTH and Vitamin D pending.  Nephrology consulted and following.

## 2024-11-15 NOTE — ASSESSMENT & PLAN NOTE
Continue Ingrezza 40 mg at bedtime  Per wife he gets the medicine from [facility], requested that she obtain more as he is running out

## 2024-11-15 NOTE — ASSESSMENT & PLAN NOTE
Lab Results   Component Value Date    HGBA1C 8.6 (H) 10/16/2024       Recent Labs     11/13/24  1635 11/14/24  0542 11/14/24  1605 11/15/24  0605   POCGLU 109 110 95 86     Most recent hemoglobin A1c of 8.6 and technically uncontrolled although blood sugars in the hospital have been well-controlled  Currently on semaglutide as an outpatient but was on hold due to ROSINA in the hospital  Continue sliding scale and 4 times daily Accu-Cheks and as well as a diabetic diet

## 2024-11-15 NOTE — ASSESSMENT & PLAN NOTE
Lab Results   Component Value Date    EGFR 15 11/15/2024    EGFR 16 11/14/2024    EGFR 16 11/12/2024    CREATININE 3.94 (H) 11/15/2024    CREATININE 3.83 (H) 11/14/2024    CREATININE 3.78 (H) 11/12/2024     Recent creatinine of 3.9411/15 Prior baseline around 2.9-3.0  Etiology felt to be related potentially to ATN in the setting of uncontrolled hypertension and complicated by contrast associated nephropathy  Nephrology was consulted and followed and a UA showed microhematuria and proteinuria.  An ultrasound of the kidney/bladder showed wall thickening but no hydronephrosis  Bladder scans negative for retention  Monitor BMP triweekly or sooner if clinically indicated  Demadex has been on hold and had periodically required IV fluids  Nephrology following; placed on low phos diet and started on mag   Follow with nephrology as an outpatient or sooner on the [facility] if any acute changes

## 2024-11-15 NOTE — PROGRESS NOTES
Progress Note - PMR   Name: Tommie Taylor 59 y.o. male I MRN: [id number]  Unit/Bed#: [facility] 261-01 I Date of Admission: 10/26/2024   Date of Service: 11/15/2024 I Hospital Day: 20     Assessment & Plan  CVA (cerebral vascular accident) (Formerly [facility])  Patient with uncontrolled hypertension and diabetes presents with right upper and right lower extremity weakness as well as facial droop  A CT of the head as well as a CTA of the head and neck were completed with negative for acute abnormalities for an acute process  TNK had been administered following the correction of his hypertension after being placed on a Cardene drip  After ministration of the TNK developed worsening dysarthria as well as headache and a repeat CT was still negative.  The 24 post TNK CT was also negative  Neurology followed the patient and MRI was completed and was significant for left corona radiata stroke  Placed on dual antiplatelet therapy for 3 weeks with plans for monotherapy with aspirin and statin indefinitely for secondary stroke prophylaxis (10/15-11/5) started on ASA and statin monotherapy   Recommendation for a Zio patch as an outpatient  Follow-up with neurovascular attending in 6 to 8 weeks  Physical, occupational and speech therapy while on the acute rehabilitation unit  Episode of worsening RLE weakness,CT 11/4 no acute changes.   Bipolar I disorder, severe, current or most recent episode depressed, with psychotic features (Formerly [facility])  History of chronic bipolar 1 disorder and follows with outpatient psychiatry and was seen inpatient  Mood has been stable and is maintained on Zyprexa, bupropion and trazodone as well as Ingrezza for tardive dyskinesia  Follow-up with psychiatry as an outpatient and consider virtual consultation if indicated for any changes while on ARC  GERD (gastroesophageal reflux disease)  Continue Protonix as well as as needed Tums  Insomnia  Continue trazodone and consider sleep logs  Encourage use of CPAP   DAISY (obstructive  sleep apnea)  Continue CPAP with home settings  Patient reports that CPAP at home has been broken for several years, encourage patient to obtain another one will request amb sleep study and plan for noc pulse ox on 11/16 for O2 determination when patient goes home.   Anemia, unspecified  Hemoglobin most recently of 10.4 which is up from 9.3 but has been hovering in the 10-11 range for most of admission  FOBT: neg x3 10/29,11/1,11/4  Started on venofer per nephrology x3 completed   Continue to monitor with biweekly CBC or sooner if clinically indicated  Hyperlipidemia  Continue Lipitor 40 mg every evening  Class 3 severe obesity due to excess calories with serious comorbidity and body mass index (BMI) of 40.0 to 44.9 in adult ([facility])  Continue with exercise and promote lifestyle modification, weight loss counseling and management of medical conditions to optimize status  Tardive dyskinesia  Continue Ingrezza 40 mg at bedtime  Per wife he gets the medicine from [place], requested that she obtain more as he is running out  Hypertension  Presented to the hospital significantly elevated blood pressure with systolic ranging from 197-231  Was placed on a Cardizem drip initially for hypertensive emergency  Had been on Demadex 20 mg daily but was on hold due to the increasing creatinine  Cw labetalol 300 mg BID , nifedipine 90 mg daily dec to 60 mg daily and hydralazine 25mg Q8h- hydral dec to 10 mg Q8h,   Goals for normotension  Mgmt per IM  Follow-up with nephrology as an outpatient  DM2 (diabetes mellitus, type 2) ([facility])  Lab Results   Component Value Date    HGBA1C 8.6 (H) 10/16/2024       Recent Labs     11/13/24  1635 11/14/24  0542 11/14/24  1605 11/15/24  0605   POCGLU 109 110 95 86     Most recent hemoglobin A1c of 8.6 and technically uncontrolled although blood sugars in the hospital have been well-controlled  Currently on semaglutide as an outpatient but was on hold due to ROSINA in the hospital  Continue sliding  scale and 4 times daily Accu-Cheks and as well as a diabetic diet    Acute kidney injury superimposed on stage 4 chronic kidney disease (HCC)  Lab Results   Component Value Date    EGFR 15 11/15/2024    EGFR 16 11/14/2024    EGFR 16 11/12/2024    CREATININE 3.94 (H) 11/15/2024    CREATININE 3.83 (H) 11/14/2024    CREATININE 3.78 (H) 11/12/2024     Recent creatinine of 3.9411/15 Prior baseline around 2.9-3.0  Etiology felt to be related potentially to ATN in the setting of uncontrolled hypertension and complicated by contrast associated nephropathy  Nephrology was consulted and followed and a UA showed microhematuria and proteinuria.  An ultrasound of the kidney/bladder showed wall thickening but no hydronephrosis  Bladder scans negative for retention  Monitor BMP triweekly or sooner if clinically indicated  Demadex has been on hold and had periodically required IV fluids  Nephrology following; placed on low phos diet and started on mag   Follow with nephrology as an outpatient or sooner on the ARC if any acute changes  Encephalopathy  Had lethargy on exam back on 10/17 in acute care with improvements however more recently had issues with confusion and decreased consciousness in the mornings that improves throughout the day with unclear etiology  Prior history of cognitive impairments with last MoCA score of 18  CT of the head was stable, B12 level (446) wnl  Was evaluated by psychiatry with no changes in medications recommended  EEG showed no epileptiform activity just moderate nonspecific dysfunction  Will need to monitor especially with change in environment now on the ARC for any changes  Impaired mobility and activities of daily living  Patient was evaluated by the rehabilitation team MD and an appropriate candidate for acute inpatient rehabilitation program at this time.  The patient will tolerate 3 hours/day 5 to 7 days/week of intensive physical, occupational and speech therapy in order to obtain goals for  community discharge  Due to the patient's functional Compared to their baseline level of function in addition to their ongoing medical needs, the patient would benefit from daily supervision from a rehabilitation physician as well as rehabilitation nursing to implement and adjust the medical as well as functional plan of care in order to meet the patient's goals.  DC: 11/18 homecare PT/OT/SLP/RN/HHA  At risk for alteration in bowel function  Constipated no bm for several days; lactulose PRN  Start colase, senna daily and miralax prn  Titrate as needed   Overgrown toenails  Cs podiatry   Vitamin D deficiency  <7 10/7  Mgmt per IM/nephrology   Fall during current hospitalization  Patient with fall to knees at shift change 10/31   Denied any knee pain  Get larger bariatric chair   Fall precautions.   Orthostatic hypotension  Noted to have orthostatic hypotension during therapy   Apply TEDs/Binder if available for patient size.   Fall precautions  Monitor anemia as above   BP medication titration per IM   Spasticity  With increased flexion tone in RUE  Due to patients kidney function would not be a good candidate for baclofen, and with his episodes of hypotension while at rehab would hold off on starting tizanidine.   Safest option to address patients spasticity would be neurotoxin injections given local effect   Hypomagnesemia  Started on mag gluconate per nephrology   Monitor w/ routine labs     History of Present Illness   Tommie Taylor is a 59 y.o. male with history of bipolar disorder, CKD stage IV, type 2 diabetes, hypertension, tardive dyskinesia, sleep apnea, GERD who presented to the [facility]14[facility]2024 for right-sided weakness and facial droop.  MRI revealed a left corona radiata infarct and neurology was consulted and was administered TNK.  He initially required a Cardene drip for his hypertensive emergency and was eventually placed on dual antiplatelet therapy with plan to  continue aspirin and Plavix until that date and continue with aspirin as monotherapy as well as atorvastatin for secondary stroke prophylaxis.  He was recommended for Zio patch placement as an outpatient.  His hospital course was complicated by acute kidney injury on top of his CKD thought to be related to ATN in the setting of uncontrolled blood pressure as well as contrast associated nephropathy.  Nephrology did follow during his course and had some levels of improvement however has not returned back to his baseline level of creatinine.  Additionally there was some levels of encephalopathy which are stable at this time. The patient was evaluated by the Rehabilitation team and deemed an appropriate candidate for comprehensive inpatient rehabilitation and admitted to the [facility] on 10/26/2024  5:13 PM     Rehab Diagnosis: Impairment of mobility, safety, Activities of Daily Living (ADLs), and cognitive/communication skills due to Stroke:  01.2  Right Body Involvement (Left Brain)  Etiologic Dx: Left Corona Radiata Infarct  Date of Onset: 10/14/2024   Date of surgery: N/A  Chief Complaint: f/u ambulatory dysfunction    Interval: Patient seen and examined in recliner. No events overnight.  Reports overall feeling well. Last BM 11/14. Excited for discharge on Monday. Reports that he is calling for assistance prior to needing to urinate. Sleeping well at night. Denies any f/c/n/v, CP, SOB, abdominal pain, constipation, or diarrhea.       Objective   Functional Update:  Physical Therapy Occupational Therapy Speech Therapy   Weight Bearing Status: Full Weight Bearing  Transfers: Incidental Touching  Bed Mobility: Incidental Touching  Amulation Distance (ft): 100 feet  Ambulation: Incidental Touching, Minimal Assistance  Assistive Device for Ambulation:  (no device)  Wheelchair Mobility Distance: 50 ft  Wheelchair Mobility: Minimal Assistance  Number of Stairs: 8  Assistive Device for Stairs: [employer] Hand Rail  Stair Assistance:  Incidental Touching  Ramp: Minimal Assistance  Discharge Recommendations: Home with:  DC Home with:: Family Support, Home Physical Therapy   Eating: Supervision (set-up)  Grooming: Supervision  Bathing: Moderate Assistance  Bathing: Moderate Assistance  Upper Body Dressing: Minimal Assistance  Lower Body Dressing: Minimal Assistance (footwear mod A)  Toileting: Moderate Assistance (intermittent incontinence of urine, having BM infrequently whihc he reports is baseline)  Tub/Shower Transfer: Minimal Assistance  Toilet Transfer: Minimal Assistance  Cognition: Exceptions to WNL  Cognition: Decreased Memory, Decreased Executive Functions, Decreased Attention, Decreased Safety, Decreased Comprehension  Orientation: Person, Place, Time, Situation   Mode of Communication: Verbal  Speech/Language: Dysarthia  Cognition: Exceptions to WNL  Cognition: Decreased Memory, Decreased Executive Functions, Decreased Attention, Decreased Comprehension  Orientation: Place, Time, Situation, Person  Discharge Recommendations: Home with:  DC Home with:: 24 Hour Supervision, 24 Hour Assisteance, Family Support, Home Speech Therapy, Outpatient Speech Therapy         Temp:  [96.6 °F (35.9 °C)-98.1 °F (36.7 °C)] 97.8 °F (36.6 °C)  HR:  [63-97] 76  Resp:  [18] 18  BP: (151-162)/(64-86) 157/86  SpO2:  [98 %-99 %] 99 %    Physical Exam    Gen: No acute distress, obese  HEENT: Moist mucus membranes, Normocephalic/Atraumatic  Cardiovascular: Regular rate, rhythm,  Heme/Extr: mild bilateral LE edema   Pulmonary: Non-labored breathing. Lungs CTAB  : No amezquita  GI:  non-distended. BS+  MSK: ROM is WFL in all extremities.   Integumentary: Skin is warm, dry. .  Neuro: facial droop, RUE weakness.   Psych: Normal mood and affect.       Scheduled Meds:  Current Facility-Administered Medications   Medication Dose Route Frequency Provider Last Rate    acetaminophen  650 mg Oral Q6H PRN Alex George DO      aspirin  81 mg Oral Daily Alex George DO       atorvastatin  40 mg Oral QPM Alex S George, DO      buPROPion  100 mg Oral BID Alex S George, DO      calcium carbonate  1,000 mg Oral BID PRN Alex S George, DO      docusate sodium  100 mg Oral BID Cathy Miranda MD      fluticasone  1 spray Each Nare Daily Joe Garcia, SAMANTHA      heparin (porcine)  7,500 Units Subcutaneous Q8H MIGEL Alex S George, DO      hydrALAZINE  10 mg Oral Q8H MIGEL Joe Garcia, CRNP      labetalol  300 mg Oral BID Alex S George, DO      lactulose  20 g Oral TID PRN Cathy Miranda MD      loratadine  5 mg Oral Daily Joe Garcia, CRNP      magnesium gluconate  500 mg Oral Daily Anahi O'Rufino, CRNP      melatonin  3 mg Oral HS Joe Garcia, CRNP      NIFEdipine  60 mg Oral Daily Joe Garcia, CRNP      OLANZapine  10 mg Oral HS Alex S George, DO      ondansetron  4 mg Oral Q6H PRN Joe Garcia, CRNP      pantoprazole  40 mg Oral Early Morning Alex S George, DO      polyethylene glycol  17 g Oral Daily Cathy Miranda MD      senna  2 tablet Oral HS Cathy Miranda MD      traZODone  50 mg Oral HS PRN Alex S George, DO      Valbenazine Tosylate  40 mg Oral HS Alex S George, DO           Lab Results: I have reviewed the following results:  Results from last 7 days   Lab Units 11/14/24  0530 11/11/24  0548   HEMOGLOBIN g/dL 9.8* 8.7*   HEMATOCRIT % 28.4* 25.9*   WBC Thousand/uL 5.38 6.15   PLATELETS Thousands/uL 184 159     Results from last 7 days   Lab Units 11/15/24  0522 11/14/24  0530 11/12/24  0555   BUN mg/dL 42* 40* 38*   SODIUM mmol/L 137 136 138   POTASSIUM mmol/L 4.8 4.7 4.5   CHLORIDE mmol/L 106 105 107   CREATININE mg/dL 3.94* 3.83* 3.78*              Cathy Miranda MD   Physical Medicine and Rehabilitation   11/15/24    I have spent a total time of 37 minutes in caring for this patient on the day of the visit/encounter including Counseling / Coordination of care, Documenting in the medical record, and Communicating with other healthcare  professionals .

## 2024-11-15 NOTE — ASSESSMENT & PLAN NOTE
Continue CPAP with home settings  Patient reports that CPAP at home has been broken for several years, encourage patient to obtain another one will request amb sleep study and plan for noc pulse ox on 11/16 for O2 determination when patient goes home.

## 2024-11-15 NOTE — ASSESSMENT & PLAN NOTE
Lab Results   Component Value Date    HGBA1C 8.6 (H) 10/16/2024       Recent Labs     11/13/24  1635 11/14/24  0542 11/14/24  1605 11/15/24  0605   POCGLU 109 110 95 86       Blood Sugar Average: Last 72 hrs:  (P) [id number]    Had not been taking anything at home.  Ran out of Ozempic a few months ago.  BG has been well controlled.  Continue cons. carb diet and BID accuchecks.

## 2024-11-15 NOTE — ASSESSMENT & PLAN NOTE
Etiology: Suspect secondary to mild dynamic changes in the setting of uncontrolled hypertension, autoregulatory failure and ATN  Peak creatinine 4.57 on 10/21/2024  Previous baseline fluctuated between high 2 and low 3 range as outpatient  ROSINA has improved and likely new baseline mid 3 to high 3 range  Current creatinine today 3.94 -fairly stable but has slightly increased daily  Home diuretics on hold  Encouraged hydration  Noted nephrotic range proteinuria underlying disease most likely secondary to diabetic kidney disease.  Okay to follow every MWF blood work schedule while in rehab

## 2024-11-15 NOTE — PROGRESS NOTES
11/15/24 1000   Pain Assessment   Pain Assessment Tool 0-10   Pain Score No Pain   Comprehension   Comprehension (FIM) 4 - Understands basic info/conversation 75-90% of time   Expression   Expression (FIM) 4 - Needs to repeat single words   Social Interaction   Social Interaction (FIM) 5 - Interacts appropriately with others 90% of time   Problem Solving   Problem solving (FIM) 4 - Solves basic problems 75-89% of time   Memory   Memory (FIM) 4 - Recognizes/recalls/performs 75-89%   Speech/Language/Cognition Assessment   Treatment Assessment Patient in recliner chair in room upon SLP session. Patient initially using unclear speech during small talk, requiring verbal cues to apply clear speech strategies. He was asked to list strategies, which he was able to recall 3/4 strategies, which is one more than he was able to recall in previous session, showing increasing in recall. He was encouraged to not only be able to recall strategies, but to apply in fxl settings, to which he was given examples of. He was then asked about responsibilities PTA and to review plans for discharge this Monday on who will take over those responsibilities and how much he will be involved himself. Patient able to recall appropriately based on therapy team's plan for pt at time of discharge. SLP requested pt call his significant other (SO) to discuss these plans as pt's SO unable to be at hospital for ST session. SLP discussed with SO plans for discharge, to which she is able to complete the recommended assistance as it relates to medications, finances, meals, appts, etc. Discussed ways to support and encourage clear speech when pt does not use in communication settings, and ways to supervise and assist on some cognitive dependent tasks (encourage pt to set timers/alarms for taking meds that go off daily, etc.). SO expressing understanding. Pt's SO encouraged to discuss with therapy team with any other questions she may have from now until  "discharge, where she verbalized understanding and appreciation. Patient then engaging in cognitive tasks targeting reasoning and word finding for given constraints. He was first given characteristic (e.g., \"they have tails\") then given list of words, one at a time, and if it fits that characteristic, to state \"yes,\" or \"no.\" Patient able to accurately identify the four words in each set on 5/7 opportunities. He was observed to have poor STM for the category, and often would state the wrong answer, thinking we were still on the previous characteristic category. He was able to improve when given verbal reminder of the characteristic being targeted with each set of words. Next, pt given WF task where he was given category and first letter. He was able to complete 10/12 independently, improving to 12/12 when given semantic cues and increased time. Pt is recommended for and will continue to benefit form further skilled SLP services focusing on overall cognitive linguistic skills and speech intelligibility skills in order to optimize independence and reduce burden of care at time of discharge.    SLP Therapy Minutes   SLP Time In 1000   SLP Time Out 1030   SLP Total Time (minutes) 30   SLP Mode of treatment - Individual (minutes) 30   SLP Mode of treatment - Concurrent (minutes) 0   SLP Mode of treatment - Group (minutes) 0   SLP Mode of treatment - Co-treat (minutes) 0   SLP Mode of Treatment - Total time(minutes) 30 minutes   SLP Cumulative Minutes 485   Therapy Time missed   Time missed? No       "

## 2024-11-15 NOTE — ASSESSMENT & PLAN NOTE
Continue use of CPAP with home settings.  Does not use CPAP at home - currently broken.  Obtain overnight noc ox prior to discharge on Saturday to determine DME evaluation.  Follow-up with Sleep Medicine as outpatient.

## 2024-11-15 NOTE — PROGRESS NOTES
11/15/24 1330   Pain Assessment   Pain Assessment Tool 0-10   Pain Score No Pain   Restrictions/Precautions   Precautions Bed/chair alarms;Cognitive;Fall Risk;Supervision on toilet/commode   Braces or Orthoses   (TEDs)   General   Change In Medical/Functional Status BP supine 166/86  and standing 106/71    NSG requested orthostatics   Sit to Lying   Type of Assistance Needed Supervision   Sit to Lying CARE Score 4   Lying to Sitting on Side of Bed   Type of Assistance Needed Incidental touching   Comment needed hand assit to raise trunk from bed- DIL got to see that he needed some help getting up   Lying to Sitting on Side of Bed CARE Score 4   Sit to Stand   Type of Assistance Needed Supervision   Comment perforemd CS this session,  a few time pt would have LOB and drop back into chair  did not pose a safety concern because chair was right behind pt   Sit to Stand CARE Score 4   Bed-Chair Transfer   Type of Assistance Needed Supervision   Comment very close S  no device,  performed by HAI at times t/o session   Chair/Bed-to-Chair Transfer CARE Score 4   Walk 10 Feet   Type of Assistance Needed Supervision   Comment CS no device   Walk 10 Feet CARE Score 4   Walk 50 Feet with Two Turns   Type of Assistance Needed Supervision   Comment CS no device   NOTE: pt did have a few lateral mild LOB but did not grab pt and he did self recover ,  also toward end of session family did see that he was getting tired and more unsteady   Walk 50 Feet with Two Turns CARE Score 4   Walk 150 Feet   Comment only challenged shorter distances   Walking 10 Feet on Uneven Surfaces   Type of Assistance Needed Supervision   Comment Performed ramp CS level with family watching,  also walked floor mat CS with family watching,  Will either perform floor to std transfer this weeekend in case of fall at home or therapist educated to just call 911 due to pts size and R estefania could injury someone or himself trying to get up   Walking 10 Feet on  Uneven Surfaces CARE Score 4   Ambulation   Primary Mode of Locomotion Prior to Admission Walk   Distance Walked (feet) 100 ft  (100x3   50x4)   Walk Assist Level Close Supervision   Findings Educated family in S with walking due to fatigue component and R leg scuff,  Pts wife sherice did cue pt to  R leg t/o session when heard him scuffing however she did not walk by him she stayed in scooter,  HAI did perform multiple walks with pt - she liked gait belt he had on so showed her on [employer] which she states she is going to order.  She states she will be with him as much as possible at D/C but she does work overnight , she did well with walking next to him ,  Educated that he should only walk shorter house hold distances because of fatigue component and fall risk when hes tired,   Does the patient walk? 2. Yes   Curb or Single Stair   Style negotiated Curb   Type of Assistance Needed Incidental touching;Physical assistance   Physical Assistance Level 25% or less   Comment Within discussion of family pt has to go up ramp and still have to perform 6inch step- which pt stated only had to do ramp,  she states its in a door way so pt will hold door frame,  Practiced up/down 6inch box in door way and pt would hold with L hand on door jam  performed a few times with pt and then HAI gaurded pt a few times,  She had to cue him to go up with L leg and down with R because pt was switching it up   1 Step (Curb) CARE Score 3   4 Steps   Type of Assistance Needed Incidental touching   Comment performed steps to show family   4 Steps CARE Score 4   12 Steps   Type of Assistance Needed Incidental touching   Comment performed steps to show family but not recommending pt be on full flight of steps which they have bed set up downstairs for him   12 Steps CARE Score 4   Picking Up Object   Type of Assistance Needed Incidental touching   Comment picked up kids shoe off floor   Picking Up Object CARE Score 4   Toilet Transfer   Type of  Assistance Needed Supervision   Comment CS performed with DIL   Toilet Transfer CARE Score 4   Toilet Transfer   Findings Pt had to have BM so had DIL assist pt with toileting and hygiene   Therapeutic Interventions   Strengthening supine bridges ,  RLE knee to chest to emphasize flexion   Other performed multiple bed mobility supine-sit on direction he will do at home with family watching , pt did multiple times at supervision level but then at end needed slight assist to sit up   Other Comments   Comments Pt states hes going to use electric scooter for community -  had wife get off hers and pt transfered on scooter and drive a little in hallway with only LUE,  he did okay with what was observed   Assessment   Treatment Assessment As noted above pts girlfriend and DIL and grandson present for family training.  Initially DIL was just listening and chasing son around ,  had trained on importance of gaurding pt on ramp and step for home entry and she was able to appropriately gaurd him (wife did not gaurd).  She did multiple walks with providing supervision and she is going to order gait belt for pts initial D/C home.  She helped him in bathroom after BM.  From discussion and dynamics the DIL will be assisting pt more then wife .  DIL works nights so  her plan is to be with pt during daytimes.  Explained pt should have BSC so he doesnt have to walk in the middle of night , also explained that pt hasnt been getting up at night he has been wetting brief so they need to perform hygiene in morning.  Pt this session inconsitant with bed mobility.  Trialed all CS walking this session which was fine because didnt tax patient with long distances- few periods of lateral drift but pt self corrected.  Overall family did engage with education , DIL much more than wife, and wife or girlfiend didnot get off scooter and and aide in any way.   Family/Caregiver Present wife/girlfriend sherice , and DIL   Barriers to Discharge None   PT  Barriers   Physical Impairment Decreased strength;Decreased endurance;Impaired balance;Decreased mobility;Decreased cognition;Decreased safety awareness   Plan   Progress Progressing toward goals   PT Therapy Minutes   PT Time In 1330   PT Time Out 1500   PT Total Time (minutes) 90   PT Mode of treatment - Individual (minutes) 90   PT Mode of treatment - Concurrent (minutes) 0   PT Mode of treatment - Group (minutes) 0   PT Mode of treatment - Co-treat (minutes) 0   PT Mode of Treatment - Total time(minutes) 90 minutes   PT Cumulative Minutes 1675   Therapy Time missed   Time missed? No

## 2024-11-16 LAB
GLUCOSE SERPL-MCNC: 141 MG/DL (ref 65–140)
GLUCOSE SERPL-MCNC: 88 MG/DL (ref 65–140)

## 2024-11-16 PROCEDURE — 82948 REAGENT STRIP/BLOOD GLUCOSE: CPT

## 2024-11-16 PROCEDURE — 94660 CPAP INITIATION&MGMT: CPT

## 2024-11-16 PROCEDURE — 97112 NEUROMUSCULAR REEDUCATION: CPT

## 2024-11-16 PROCEDURE — 97530 THERAPEUTIC ACTIVITIES: CPT

## 2024-11-16 PROCEDURE — 94003 VENT MGMT INPAT SUBQ DAY: CPT

## 2024-11-16 RX ADMIN — HYDRALAZINE HYDROCHLORIDE 10 MG: 10 TABLET ORAL at 13:39

## 2024-11-16 RX ADMIN — LORATADINE 5 MG: 10 TABLET ORAL at 09:29

## 2024-11-16 RX ADMIN — POLYETHYLENE GLYCOL 3350 17 G: 17 POWDER, FOR SOLUTION ORAL at 09:32

## 2024-11-16 RX ADMIN — Medication 500 MG: at 09:40

## 2024-11-16 RX ADMIN — LABETALOL HYDROCHLORIDE 300 MG: 100 TABLET, FILM COATED ORAL at 09:29

## 2024-11-16 RX ADMIN — HEPARIN SODIUM 7500 UNITS: 5000 INJECTION INTRAVENOUS; SUBCUTANEOUS at 21:24

## 2024-11-16 RX ADMIN — HEPARIN SODIUM 7500 UNITS: 5000 INJECTION INTRAVENOUS; SUBCUTANEOUS at 05:27

## 2024-11-16 RX ADMIN — PANTOPRAZOLE SODIUM 40 MG: 40 TABLET, DELAYED RELEASE ORAL at 05:27

## 2024-11-16 RX ADMIN — OLANZAPINE 10 MG: 2.5 TABLET, FILM COATED ORAL at 21:23

## 2024-11-16 RX ADMIN — HYDRALAZINE HYDROCHLORIDE 10 MG: 10 TABLET ORAL at 05:27

## 2024-11-16 RX ADMIN — ATORVASTATIN CALCIUM 40 MG: 40 TABLET, FILM COATED ORAL at 18:08

## 2024-11-16 RX ADMIN — HEPARIN SODIUM 7500 UNITS: 5000 INJECTION INTRAVENOUS; SUBCUTANEOUS at 13:39

## 2024-11-16 RX ADMIN — DOCUSATE SODIUM 100 MG: 100 CAPSULE, LIQUID FILLED ORAL at 18:08

## 2024-11-16 RX ADMIN — ASPIRIN 81 MG: 81 TABLET, COATED ORAL at 09:29

## 2024-11-16 RX ADMIN — DOCUSATE SODIUM 100 MG: 100 CAPSULE, LIQUID FILLED ORAL at 09:29

## 2024-11-16 RX ADMIN — BUPROPION HYDROCHLORIDE 100 MG: 100 TABLET, FILM COATED ORAL at 09:29

## 2024-11-16 RX ADMIN — NIFEDIPINE 60 MG: 30 TABLET, FILM COATED, EXTENDED RELEASE ORAL at 09:29

## 2024-11-16 RX ADMIN — BUPROPION HYDROCHLORIDE 100 MG: 100 TABLET, FILM COATED ORAL at 18:07

## 2024-11-16 RX ADMIN — LABETALOL HYDROCHLORIDE 300 MG: 100 TABLET, FILM COATED ORAL at 21:20

## 2024-11-16 RX ADMIN — MELATONIN TAB 3 MG 3 MG: 3 TAB at 21:22

## 2024-11-16 RX ADMIN — HYDRALAZINE HYDROCHLORIDE 10 MG: 10 TABLET ORAL at 21:22

## 2024-11-16 RX ADMIN — FLUTICASONE PROPIONATE 1 SPRAY: 50 SPRAY, METERED NASAL at 09:34

## 2024-11-16 NOTE — PLAN OF CARE
Problem: SAFETY ADULT  Goal: Patient will remain free of falls  Description: INTERVENTIONS:  - Educate patient/family on patient safety including physical limitations  - Instruct patient to call for assistance with activity   - Consult OT/PT to assist with strengthening/mobility   - Keep Call bell within reach  - Keep bed low and locked with side rails adjusted as appropriate  - Keep care items and personal belongings within reach  - Initiate and maintain comfort rounds  - Make Fall Risk Sign visible to staff  - Offer Toileting every 2-4 Hours, in advance of need  - Initiate/Maintain bed alarm  - Obtain necessary fall risk management equipment: matts  - Apply yellow socks and bracelet for high fall risk patients  - Consider moving patient to room near nurses station  Outcome: Progressing

## 2024-11-16 NOTE — PLAN OF CARE
Problem: CARDIOVASCULAR - ADULT  Goal: Maintains optimal cardiac output and hemodynamic stability  Description: INTERVENTIONS:  - Monitor I/O, vital signs and rhythm  - Monitor for S/S and trends of decreased cardiac output  - Administer and titrate ordered vasoactive medications to optimize hemodynamic stability  - Assess quality of pulses, skin color and temperature  - Assess for signs of decreased coronary artery perfusion  - Instruct patient to report change in severity of symptoms  Outcome: Progressing     Problem: METABOLIC, FLUID AND ELECTROLYTES - ADULT  Goal: Electrolytes maintained within normal limits  Description: INTERVENTIONS:  - Monitor labs and assess patient for signs and symptoms of electrolyte imbalances  - Administer electrolyte replacement as ordered  - Monitor response to electrolyte replacements, including repeat lab results as appropriate  - Instruct patient on fluid and nutrition as appropriate  Outcome: Progressing

## 2024-11-16 NOTE — PROGRESS NOTES
OT TREATMENT       11/16/24 1000   Pain Assessment   Pain Assessment Tool 0-10   Pain Score No Pain   Restrictions/Precautions   Precautions Bed/chair alarms;Cognitive;Fall Risk;Supervision on toilet/commode   Weight Bearing Restrictions No   ROM Restrictions No   Lifestyle   Autonomy Pt met seated in recliner agreeable to therapy   Sit to Lying   Type of Assistance Needed Supervision   Physical Assistance Level No physical assistance   Comment transition from EOM to supine on mat   Sit to Lying CARE Score 4   Lying to Sitting on Side of Bed   Type of Assistance Needed Supervision   Physical Assistance Level No physical assistance   Comment extended time for transition from supine to seated at EOM   Lying to Sitting on Side of Bed CARE Score 4   Sit to Stand   Type of Assistance Needed Supervision   Physical Assistance Level No physical assistance   Comment no AD   Sit to Stand CARE Score 4   Bed-Chair Transfer   Type of Assistance Needed Supervision   Physical Assistance Level No physical assistance   Comment CS SPT to/from WC and recliner, mat   Chair/Bed-to-Chair Transfer CARE Score 4   Functional Standing Tolerance   Time 4.5 min, 5 min, 6 min   Activity card sorting activity   Comments In stance without AD, pt completed card sorting task requiring R weightshift to retrieve cards from table top and sh flexion to tap to target with LUE. 15.5 mins total in stance with x2 seated rest breaks in between. 1 minor anterior LOB requiring no physical A to maintain upright, pt able to self correct   Therapeutic Exercise - ROM   UE-ROM Yes  (Pt tolerated RUE PROM In all pivots/joints for increased tissue extensibility, to maintain available ROM.)   Neuromuscular Education   Comments NMR:Pt tolerated NMES to RUE wrist/digit extensors and triceps with use of Saebo Eileen Stim ~ 15 mins, P1, Freq: 50Hz, 5sec on 5 sec off, 1 sec ramp up/down, Amp: extensors:39 triceps: 43 with minimal muscle contraction and c/o discomfort above  43. NMES followed by CATALINO OLSON for improved functional use 3 sets x10 trials supine on mat: elbow extension (90 degrees sh abduction, GA), shoulder flexion with aircast to maintain elbow extension, wrist flexion/extension, digit flexion/extension.   Cognition   Overall Cognitive Status Impaired   Arousal/Participation Alert;Cooperative   Attention Attends with cues to redirect   Orientation Level Oriented X4   Memory Decreased short term memory   Following Commands Follows one step commands with increased time or repetition   Activity Tolerance   Activity Tolerance Patient tolerated treatment well   Assessment   Treatment Assessment Pt participated in 90 min skilled OT session with focus on CATALINO KAY, standing balance/tolerance. Pt tolerated treatment well, pt remained seated in recliner with all immediate needs met, call bell accessible, chair alarm secured . Pt will continue to benefit from skilled OT services to ensure safe discharge. Continue plan of care with focus on functional transfers and safety awareness.   Prognosis Fair   Problem List Decreased strength;Decreased range of motion;Decreased endurance;Impaired balance;Decreased safety awareness;Impaired judgement;Decreased mobility   Barriers to Discharge None   Plan   Treatment/Interventions ADL retraining;Functional transfer training;Therapeutic exercise;Endurance training;Patient/family training;Equipment eval/education;Bed mobility;Compensatory technique education   Progress Progressing toward goals   Discharge Recommendation   Rehab Resource Intensity Level, OT   (home with support)   OT Therapy Minutes   OT Time In 1000   OT Time Out 1130   OT Total Time (minutes) 90   OT Mode of treatment - Individual (minutes) 90   OT Mode of treatment - Concurrent (minutes) 0   OT Mode of treatment - Group (minutes) 0   OT Mode of treatment - Co-treat (minutes) 0   OT Mode of Treatment - Total time(minutes) 90 minutes   OT Cumulative Minutes 1480   Therapy Time  missed   Time missed? No

## 2024-11-17 LAB
GLUCOSE SERPL-MCNC: 110 MG/DL (ref 65–140)
GLUCOSE SERPL-MCNC: 88 MG/DL (ref 65–140)

## 2024-11-17 PROCEDURE — 97116 GAIT TRAINING THERAPY: CPT

## 2024-11-17 PROCEDURE — 97535 SELF CARE MNGMENT TRAINING: CPT

## 2024-11-17 PROCEDURE — 82948 REAGENT STRIP/BLOOD GLUCOSE: CPT

## 2024-11-17 PROCEDURE — 97110 THERAPEUTIC EXERCISES: CPT

## 2024-11-17 PROCEDURE — 97129 THER IVNTJ 1ST 15 MIN: CPT

## 2024-11-17 PROCEDURE — 97530 THERAPEUTIC ACTIVITIES: CPT

## 2024-11-17 PROCEDURE — 94762 N-INVAS EAR/PLS OXIMTRY CONT: CPT

## 2024-11-17 PROCEDURE — 97130 THER IVNTJ EA ADDL 15 MIN: CPT

## 2024-11-17 RX ADMIN — NIFEDIPINE 60 MG: 30 TABLET, FILM COATED, EXTENDED RELEASE ORAL at 09:52

## 2024-11-17 RX ADMIN — Medication 500 MG: at 09:52

## 2024-11-17 RX ADMIN — LORATADINE 5 MG: 10 TABLET ORAL at 09:52

## 2024-11-17 RX ADMIN — HYDRALAZINE HYDROCHLORIDE 10 MG: 10 TABLET ORAL at 22:16

## 2024-11-17 RX ADMIN — POLYETHYLENE GLYCOL 3350 17 G: 17 POWDER, FOR SOLUTION ORAL at 09:55

## 2024-11-17 RX ADMIN — PANTOPRAZOLE SODIUM 40 MG: 40 TABLET, DELAYED RELEASE ORAL at 06:17

## 2024-11-17 RX ADMIN — DOCUSATE SODIUM 100 MG: 100 CAPSULE, LIQUID FILLED ORAL at 09:55

## 2024-11-17 RX ADMIN — BUPROPION HYDROCHLORIDE 100 MG: 100 TABLET, FILM COATED ORAL at 09:53

## 2024-11-17 RX ADMIN — ASPIRIN 81 MG: 81 TABLET, COATED ORAL at 09:55

## 2024-11-17 RX ADMIN — FLUTICASONE PROPIONATE 1 SPRAY: 50 SPRAY, METERED NASAL at 09:55

## 2024-11-17 RX ADMIN — BUPROPION HYDROCHLORIDE 100 MG: 100 TABLET, FILM COATED ORAL at 18:08

## 2024-11-17 RX ADMIN — MELATONIN TAB 3 MG 3 MG: 3 TAB at 22:16

## 2024-11-17 RX ADMIN — HYDRALAZINE HYDROCHLORIDE 10 MG: 10 TABLET ORAL at 14:19

## 2024-11-17 RX ADMIN — HEPARIN SODIUM 7500 UNITS: 5000 INJECTION INTRAVENOUS; SUBCUTANEOUS at 22:16

## 2024-11-17 RX ADMIN — HEPARIN SODIUM 7500 UNITS: 5000 INJECTION INTRAVENOUS; SUBCUTANEOUS at 14:19

## 2024-11-17 RX ADMIN — LABETALOL HYDROCHLORIDE 300 MG: 100 TABLET, FILM COATED ORAL at 09:52

## 2024-11-17 RX ADMIN — HEPARIN SODIUM 7500 UNITS: 5000 INJECTION INTRAVENOUS; SUBCUTANEOUS at 06:17

## 2024-11-17 RX ADMIN — HYDRALAZINE HYDROCHLORIDE 10 MG: 10 TABLET ORAL at 06:17

## 2024-11-17 RX ADMIN — ATORVASTATIN CALCIUM 40 MG: 40 TABLET, FILM COATED ORAL at 18:08

## 2024-11-17 RX ADMIN — LABETALOL HYDROCHLORIDE 300 MG: 100 TABLET, FILM COATED ORAL at 22:16

## 2024-11-17 RX ADMIN — DOCUSATE SODIUM 100 MG: 100 CAPSULE, LIQUID FILLED ORAL at 18:07

## 2024-11-17 RX ADMIN — OLANZAPINE 10 MG: 2.5 TABLET, FILM COATED ORAL at 22:17

## 2024-11-17 NOTE — PROGRESS NOTES
"   11/17/24 1030   Pain Assessment   Pain Assessment Tool 0-10   Pain Score No Pain   Patient's Stated Pain Goal No pain   Restrictions/Precautions   Precautions Bed/chair alarms;Fall Risk;Supervision on toilet/commode   Braces or Orthoses Other (Comment)  (TEDs)   Lifestyle   Autonomy \"I'm ready. What you want to do?\"   Reciprocal Relationships family   Service to Others Retired working in [employer]   Intrinsic Gratification TV   Eating   Type of Assistance Needed Set-up / clean-up   Physical Assistance Level No physical assistance   Comment cut food, open containers   Eating CARE Score 5   Oral Hygiene   Type of Assistance Needed Set-up / clean-up   Physical Assistance Level No physical assistance   Comment seated at sink   Oral Hygiene CARE Score 5   Shower/Bathe Self   Type of Assistance Needed Physical assistance   Physical Assistance Level 25% or less   Comment assist buttocks, left axilla   Shower/Bathe Self CARE Score 3   Tub/Shower Transfer   Adaptive Equipment Transfer Bench;Grab Bars   Assessed Shower   Findings close supervision no AD   Upper Body Dressing   Type of Assistance Needed Set-up / clean-up   Physical Assistance Level No physical assistance   Comment Pt able to completely don/doff [place] sweatershirt today without physical assistance by placing the shirt oriented backward on his lap and threading the left UE across his body, then twisting the shirt around the arm so that it is oriented correctly before threading the left UE. Pt able to return demonstrate this method x 2 without physical A.   Upper Body Dressing CARE Score 5   Lower Body Dressing   Type of Assistance Needed Supervision   Physical Assistance Level No physical assistance   Comment seated EOB to thread then standing to hike. VC's to make sure right hip is fully covered with pants.   Lower Body Dressing CARE Score 4   Putting On/Taking Off Footwear   Type of Assistance Needed Physical assistance   Physical Assistance Level 26%-50% "   Comment TA Teds, independent sneaker don/doff with elastic laces.   Putting On/Taking Off Footwear CARE Score 3   Picking Up Object   Type of Assistance Needed Incidental touching   Physical Assistance Level No physical assistance   Comment CGA to  sock off the floor   Picking Up Object CARE Score 4   Sit to Stand   Type of Assistance Needed Supervision   Physical Assistance Level No physical assistance   Comment no AD   Sit to Stand CARE Score 4   Bed-Chair Transfer   Type of Assistance Needed Supervision   Physical Assistance Level No physical assistance   Comment CS stand pivot transfers, in-room ambulation during ADL   Chair/Bed-to-Chair Transfer CARE Score 4   Toileting Hygiene   Type of Assistance Needed Supervision   Physical Assistance Level No physical assistance   Comment close supervision PF BSC over toilet   Toileting Hygiene CARE Score 4   Toilet Transfer   Type of Assistance Needed Supervision   Physical Assistance Level No physical assistance   Comment CS PF BSC   Toilet Transfer CARE Score 4   ROM- Right Upper Extremities   RUE ROM Comment HEP established and handouts provided for AAROM of RUE: supine scapular protraction, shoulder flexion, AB/ADD, elbow extension, wrist flex/ext, digital flex/ext. Pt able to participate in all exercises 2 sets 10reps   Additional Activities   Additional Activities Comments Ambulation to/from OT gym   Activity Tolerance   Activity Tolerance Patient tolerated treatment well   Assessment   Treatment Assessment Pt engaged in 60min OT session focused on final assessment of selfcare performance and related functional mobility. See above for performance details. Pt also given a formalized HEP for self assisted AAROM RUEs. Pt has met his therapy goals and will be transitioning to home tomorrow with family support, home OT/PT/nsg/HHA with eventual plan to transition to outpt neuro rehab for all three therapy disciplines.   Prognosis Good   Barriers to Discharge None    Discharge Recommendation   Equipment Recommended   (PF BSC, transfer tub bench, elastic shoe laces)   Discharge Summary Pt has met his therapy goals and will be transitioning to home tomorrow with family support, home OT/PT/nsg/HHA with eventual plan to transition to outpt neuro rehab for all three therapy disciplines.   OT Therapy Minutes   OT Time In 1030   OT Time Out 1130   OT Total Time (minutes) 60   OT Mode of treatment - Individual (minutes) 60   OT Mode of treatment - Concurrent (minutes) 0   OT Mode of treatment - Group (minutes) 0   OT Mode of treatment - Co-treat (minutes) 0   OT Mode of Treatment - Total time(minutes) 60 minutes   OT Cumulative Minutes 1540   Therapy Time missed   Time missed? No

## 2024-11-17 NOTE — PROGRESS NOTES
11/17/24 1400   Pain Assessment   Pain Assessment Tool 0-10   Pain Score No Pain   Restrictions/Precautions   Precautions Bed/chair alarms;Cognitive;Fall Risk;Supervision on toilet/commode;Visual deficit   Cognition   Overall Cognitive Status Impaired   Arousal/Participation Alert;Cooperative   Attention Attends with cues to redirect   Orientation Level Oriented X4   Memory Decreased short term memory   Following Commands Follows one step commands with increased time or repetition   Subjective   Subjective denies concern for DC home tomorrow.   Roll Left and Right   Type of Assistance Needed Independent   Physical Assistance Level No physical assistance   Roll Left and Right CARE Score 6   Sit to Lying   Type of Assistance Needed Independent   Physical Assistance Level No physical assistance   Comment with inc time for self LE lifting   Sit to Lying CARE Score 6   Lying to Sitting on Side of Bed   Type of Assistance Needed Independent   Physical Assistance Level No physical assistance   Lying to Sitting on Side of Bed CARE Score 6   Sit to Stand   Type of Assistance Needed Supervision   Physical Assistance Level No physical assistance   Sit to Stand CARE Score 4   Bed-Chair Transfer   Type of Assistance Needed Supervision   Physical Assistance Level No physical assistance   Chair/Bed-to-Chair Transfer CARE Score 4   Car Transfer   Type of Assistance Needed Supervision   Physical Assistance Level No physical assistance   Comment simulated at [facility]   Car Transfer CARE Score 4   Walk 10 Feet   Type of Assistance Needed Supervision   Physical Assistance Level No physical assistance   Walk 10 Feet CARE Score 4   Walk 50 Feet with Two Turns   Type of Assistance Needed Supervision   Physical Assistance Level No physical assistance   Walk 50 Feet with Two Turns CARE Score 4   Walk 150 Feet   Type of Assistance Needed Supervision   Physical Assistance Level No physical assistance   Walk 150 Feet CARE Score 4   Walking 10  Feet on Uneven Surfaces   Type of Assistance Needed Supervision   Physical Assistance Level No physical assistance   Comment CS on ramp and over mat on floor.   Walking 10 Feet on Uneven Surfaces CARE Score 4   Ambulation   Primary Mode of Locomotion Prior to Admission Walk   Distance Walked (feet) 150 ft  (200)   Assist Device Other  (none)   Gait Pattern Decreased foot clearance;R foot drag   Walk Assist Level Close Supervision   Findings use of gait belt, grossly CS this session without need for CGA. varied R foot clearance pending ftg.   Does the patient walk? 2. Yes   Wheel 50 Feet with Two Turns   Reason if not Attempted Activity not applicable   Wheel 50 Feet with Two Turns CARE Score 9   Wheel 150 Feet   Reason if not Attempted Activity not applicable   Wheel 150 Feet CARE Score 9   Wheelchair mobility   Findings (S)  pt househould ambulator at [place], with S. no WC needed at this time .   Does the patient use a wheelchair? 0. No   Curb or Single Stair   Style negotiated Curb   Type of Assistance Needed Supervision   Physical Assistance Level No physical assistance   Comment 6inch with door way support, CS for safety.   1 Step (Curb) CARE Score 4   4 Steps   Type of Assistance Needed Supervision   Physical Assistance Level No physical assistance   Comment recip ascending , non recip descending, L HR.   4 Steps CARE Score 4   12 Steps   Comment (S)  not a goal for DC - pt to have first floor setup.   Reason if not Attempted Safety concerns   12 Steps CARE Score 88   Picking Up Object   Type of Assistance Needed Supervision   Physical Assistance Level No physical assistance   Comment CS no reacher, S reacher.   Picking Up Object CARE Score 4   Therapeutic Interventions   Strengthening Access Code: [email or web address]  URL: [email or web address]  Date: 11/17/2024  Prepared by: Ciara Antony    Exercises  - Supine Bridge  - 1-2 x daily - 5-7 x weekly - 2-3 sets - 10 reps  - Clamshell  - 1-2 x daily - 5-7 x weekly  - 3 sets - 10 reps  - Seated Long Arc Quad  - 1-3 x daily - 5-7 x weekly - 3 sets - 10 reps  - Seated March  - 1-2 x daily - 5-7 x weekly - 3 sets - 10 reps  - Seated Toe Raise  - 1-3 x daily - 5-7 x weekly - 3 sets - 10 reps  - Sit to Stand  - 1-2 x daily - 5-7 x weekly - 2-3 sets - 5-10 reps   Other TUG 11.7 seconds no AD.   Other Comments   Comments floor to mat education - max A x2, pt unable to get self up without A. Long discussion and education on  calling squad if fall occurs, not to let family try to pick him up. Along with ongoing education to keep moving, attending to R foot and arm strength and positioning.   Assessment   Treatment Assessment Pt engaged in 90 min skilled PT intervention with focus on prep for DC home next day. All care scores captured and pt demonstrating met or exceeded all set S goals with out AD, with marked TUG improvement by 28.3 seconds. HEP handout provided and reviewed, education for f/u services and not to do anything to ambitious until home PT assesses, as well as knowing his limits and recognizing when ftgd. Gait belt use encouraged. Pt aware to call squad if fall occurs due to inability to get self up from floor. Pt verbalizes understanding. Pt ready for DC home next day with home PT and services recommended to follow with plan for out pt PT (pt has [employer] Application) when ready.   Barriers to Discharge None   Barriers to Discharge Comments family to provide S as needed.   Plan   Progress Improving as expected   PT Therapy Minutes   PT Time In 1400   PT Time Out 1530   PT Total Time (minutes) 90   PT Mode of treatment - Individual (minutes) 90   PT Mode of treatment - Concurrent (minutes) 0   PT Mode of treatment - Group (minutes) 0   PT Mode of treatment - Co-treat (minutes) 0   PT Mode of Treatment - Total time(minutes) 90 minutes   PT Cumulative Minutes 1765   Therapy Time missed   Time missed? No

## 2024-11-17 NOTE — PROGRESS NOTES
11/17/24 0900   Pain Assessment   Pain Assessment Tool 0-10   Pain Score No Pain   Restrictions/Precautions   Precautions Bed/chair alarms;Cognitive;Fall Risk;Supervision on toilet/commode;Visual deficit   Comprehension   Comprehension (FIM) 4 - Understands basic info/conversation 75-90% of time   Expression   Expression (FIM) 5 - Needs help/cues only RARELY (< 10% of the time)   Social Interaction   Social Interaction (FIM) 5 - Interacts appropriately with others 90% of time   Problem Solving   Problem solving (FIM) 4 - Solves basic problems 75-89% of time   Memory   Memory (FIM) 3 - Recognizes, recalls/performs 50-74%   Speech/Language/Cognition Assessmetn   Treatment Assessment Pt was in bed but overall positioning appeared to be uncomfortable. When SLP asked pt about this, pt did report he was fine, but did reposition self in bed throughout session. Initially pt did not recall current SLP from last session, but when providing pt w/ contextual cues about a discussion in regard to foods, pt then recalling the conversation held. Reviewed current date to where pt was able to recall full date w/o cues. Continues to be aware of d/c which is planned for tomorrow (11/18).     SLP engaging pt in completing functional verbal problem solving task to where SLP providing pt w/ a situation and then 4 possible solutions to each situation presented. Additionally, SLP providing pt w/ 4 possible solutions to each situation to where pt was to ID the FIRST step towards a solution. It was noted that pt did have fairly good insight to most basic tasks but did have some difficulty in recognizing the first steps toward a solution given questions, such as management of a grease fire or taking too much of a medication. Pt's overall ability to answer the FIRST step towards a solution was 13/16 accurate. Again, when reviewing errored items and open ended discussion held, pt was able to increased awareness of a better initial step  increasing to 15/16 accurate.     Next task completed was a verbal recall, categorization and organization task. SLP presenting pt w/ 4 words per list. Pt was to not only recall all 4 words, but was to then ID the word which did not belong as well as provide SLP w/ a reason for how the remaining 3 words were the same. It was noted that pt did start task out well, but ask PCA's has to check pt's BP's, there was a discrepancy w/ the range (initial taken w/ dynamap in R arm: 192/93; manual in L arm: 164/90). After that occurrence, pt's attention was noted to be decreased needing repetition of all words presented. However, recall given 4 words per list was 46/60 but increased to 60/60 when providing repetition of words. His ability to ID the words which did not belong was 11/15 accurate but again, increased to 14/15 when providing repetition of information. Ability to ID how the remaining words were in the same category was 12/15 accurate, increasing to 15/15 when providing repetition. At this time, pt is planned for d/c home w/ family support/supervision to where recommendations are for continued home SLP services to continue to maximize functional independence of cognitive linguistic skills in hopes for decreasing burden of care over time.    SLP Therapy Minutes   SLP Time In 0900   SLP Time Out 0930   SLP Total Time (minutes) 30   SLP Mode of treatment - Individual (minutes) 30   SLP Mode of treatment - Concurrent (minutes) 0   SLP Mode of treatment - Group (minutes) 0   SLP Mode of treatment - Co-treat (minutes) 0   SLP Mode of Treatment - Total time(minutes) 30 minutes   SLP Cumulative Minutes 515   Therapy Time missed   Time missed? No

## 2024-11-17 NOTE — PLAN OF CARE
Problem: PAIN - ADULT  Goal: Verbalizes/displays adequate comfort level or baseline comfort level  Description: Interventions:  - Encourage patient to monitor pain and request assistance  - Assess pain using appropriate pain scale  - Administer analgesics based on type and severity of pain and evaluate response  - Implement non-pharmacological measures as appropriate and evaluate response  - Consider cultural and social influences on pain and pain management  - Notify physician/advanced practitioner if interventions unsuccessful or patient reports new pain  Outcome: Progressing     Problem: SAFETY ADULT  Goal: Patient will remain free of falls  Description: INTERVENTIONS:  - Educate patient/family on patient safety including physical limitations  - Instruct patient to call for assistance with activity   - Consult OT/PT to assist with strengthening/mobility   - Keep Call bell within reach  - Keep bed low and locked with side rails adjusted as appropriate  - Keep care items and personal belongings within reach  - Initiate and maintain comfort rounds  - Make Fall Risk Sign visible to staff  - Offer Toileting every 2 Hours, in advance of need  - Obtain necessary fall risk management equipment: call bell is within reach  - Apply yellow socks and bracelet for high fall risk patients  - Consider moving patient to room near nurses station  Outcome: Progressing     Problem: Potential for Falls  Goal: Patient will remain free of falls  Description: INTERVENTIONS:  - Educate patient/family on patient safety including physical limitations  - Instruct patient to call for assistance with activity   - Consult OT/PT to assist with strengthening/mobility   - Keep Call bell within reach  - Keep bed low and locked with side rails adjusted as appropriate  - Keep care items and personal belongings within reach  - Initiate and maintain comfort rounds  - Make Fall Risk Sign visible to staff  - Offer Toileting every 2 Hours, in advance  of need  - Obtain necessary fall risk management equipment: call bell within reach  - Apply yellow socks and bracelet for high fall risk patients  - Consider moving patient to room near nurses station  Outcome: Progressing     Problem: Prexisting or High Potential for Compromised Skin Integrity  Goal: Skin integrity is maintained or improved  Description: INTERVENTIONS:  - Identify patients at risk for skin breakdown  - Assess and monitor skin integrity  - Assess and monitor nutrition and hydration status  - Monitor labs   - Assess for incontinence   - Turn and reposition patient  - Assist with mobility/ambulation  - Relieve pressure over bony prominences  - Avoid friction and shearing  - Provide appropriate hygiene as needed including keeping skin clean and dry  - Evaluate need for skin moisturizer/barrier cream  - Collaborate with interdisciplinary team   - Patient/family teaching  - Consider wound care consult   Outcome: Progressing

## 2024-11-18 ENCOUNTER — TELEPHONE (OUTPATIENT)
Dept: NEPHROLOGY | Facility: CLINIC | Age: 59
End: 2024-11-18

## 2024-11-18 VITALS
HEART RATE: 72 BPM | DIASTOLIC BLOOD PRESSURE: 87 MMHG | WEIGHT: 269.3 LBS | TEMPERATURE: 96.6 F | BODY MASS INDEX: 43.28 KG/M2 | SYSTOLIC BLOOD PRESSURE: 156 MMHG | HEIGHT: 66 IN | OXYGEN SATURATION: 97 % | RESPIRATION RATE: 18 BRPM

## 2024-11-18 LAB
ANION GAP SERPL CALCULATED.3IONS-SCNC: 9 MMOL/L (ref 4–13)
BASOPHILS # BLD AUTO: 0.06 THOUSANDS/ΜL (ref 0–0.1)
BASOPHILS NFR BLD AUTO: 1 % (ref 0–1)
BUN SERPL-MCNC: 42 MG/DL (ref 5–25)
CALCIUM SERPL-MCNC: 9.1 MG/DL (ref 8.4–10.2)
CHLORIDE SERPL-SCNC: 106 MMOL/L (ref 96–108)
CO2 SERPL-SCNC: 22 MMOL/L (ref 21–32)
CREAT SERPL-MCNC: 4.08 MG/DL (ref 0.6–1.3)
EOSINOPHIL # BLD AUTO: 0.27 THOUSAND/ΜL (ref 0–0.61)
EOSINOPHIL NFR BLD AUTO: 4 % (ref 0–6)
ERYTHROCYTE [DISTWIDTH] IN BLOOD BY AUTOMATED COUNT: 14.1 % (ref 11.6–15.1)
GFR SERPL CREATININE-BSD FRML MDRD: 15 ML/MIN/1.73SQ M
GLUCOSE SERPL-MCNC: 80 MG/DL (ref 65–140)
GLUCOSE SERPL-MCNC: 91 MG/DL (ref 65–140)
HCT VFR BLD AUTO: 30.6 % (ref 36.5–49.3)
HGB BLD-MCNC: 10.2 G/DL (ref 12–17)
IMM GRANULOCYTES # BLD AUTO: 0.07 THOUSAND/UL (ref 0–0.2)
IMM GRANULOCYTES NFR BLD AUTO: 1 % (ref 0–2)
LYMPHOCYTES # BLD AUTO: 1.95 THOUSANDS/ΜL (ref 0.6–4.47)
LYMPHOCYTES NFR BLD AUTO: 31 % (ref 14–44)
MAGNESIUM SERPL-MCNC: 1.7 MG/DL (ref 1.9–2.7)
MCH RBC QN AUTO: 27.1 PG (ref 26.8–34.3)
MCHC RBC AUTO-ENTMCNC: 33.3 G/DL (ref 31.4–37.4)
MCV RBC AUTO: 81 FL (ref 82–98)
MONOCYTES # BLD AUTO: 0.75 THOUSAND/ΜL (ref 0.17–1.22)
MONOCYTES NFR BLD AUTO: 12 % (ref 4–12)
NEUTROPHILS # BLD AUTO: 3.17 THOUSANDS/ΜL (ref 1.85–7.62)
NEUTS SEG NFR BLD AUTO: 51 % (ref 43–75)
NRBC BLD AUTO-RTO: 0 /100 WBCS
PHOSPHATE SERPL-MCNC: 4.3 MG/DL (ref 2.7–4.5)
PLATELET # BLD AUTO: 203 THOUSANDS/UL (ref 149–390)
PMV BLD AUTO: 9.7 FL (ref 8.9–12.7)
POTASSIUM SERPL-SCNC: 4.5 MMOL/L (ref 3.5–5.3)
RBC # BLD AUTO: 3.76 MILLION/UL (ref 3.88–5.62)
SODIUM SERPL-SCNC: 137 MMOL/L (ref 135–147)
WBC # BLD AUTO: 6.27 THOUSAND/UL (ref 4.31–10.16)

## 2024-11-18 PROCEDURE — 84100 ASSAY OF PHOSPHORUS: CPT | Performed by: NURSE PRACTITIONER

## 2024-11-18 PROCEDURE — 85025 COMPLETE CBC W/AUTO DIFF WBC: CPT | Performed by: NURSE PRACTITIONER

## 2024-11-18 PROCEDURE — 80048 BASIC METABOLIC PNL TOTAL CA: CPT | Performed by: NURSE PRACTITIONER

## 2024-11-18 PROCEDURE — 83735 ASSAY OF MAGNESIUM: CPT | Performed by: NURSE PRACTITIONER

## 2024-11-18 PROCEDURE — 82948 REAGENT STRIP/BLOOD GLUCOSE: CPT

## 2024-11-18 PROCEDURE — 99239 HOSP IP/OBS DSCHRG MGMT >30: CPT | Performed by: INTERNAL MEDICINE

## 2024-11-18 PROCEDURE — 99232 SBSQ HOSP IP/OBS MODERATE 35: CPT | Performed by: INTERNAL MEDICINE

## 2024-11-18 RX ORDER — ERGOCALCIFEROL 1.25 MG/1
50000 CAPSULE, LIQUID FILLED ORAL 3 TIMES WEEKLY
Qty: 12 CAPSULE | Refills: 0 | Status: SHIPPED | OUTPATIENT
Start: 2024-11-20

## 2024-11-18 RX ADMIN — NIFEDIPINE 60 MG: 30 TABLET, FILM COATED, EXTENDED RELEASE ORAL at 08:47

## 2024-11-18 RX ADMIN — ERGOCALCIFEROL 50000 UNITS: 1.25 CAPSULE ORAL at 08:47

## 2024-11-18 RX ADMIN — ASPIRIN 81 MG: 81 TABLET, COATED ORAL at 08:47

## 2024-11-18 RX ADMIN — HEPARIN SODIUM 7500 UNITS: 5000 INJECTION INTRAVENOUS; SUBCUTANEOUS at 05:44

## 2024-11-18 RX ADMIN — BUPROPION HYDROCHLORIDE 100 MG: 100 TABLET, FILM COATED ORAL at 08:47

## 2024-11-18 RX ADMIN — PANTOPRAZOLE SODIUM 40 MG: 40 TABLET, DELAYED RELEASE ORAL at 05:44

## 2024-11-18 RX ADMIN — Medication 500 MG: at 08:47

## 2024-11-18 RX ADMIN — LORATADINE 5 MG: 10 TABLET ORAL at 08:47

## 2024-11-18 RX ADMIN — FLUTICASONE PROPIONATE 1 SPRAY: 50 SPRAY, METERED NASAL at 08:48

## 2024-11-18 RX ADMIN — DOCUSATE SODIUM 100 MG: 100 CAPSULE, LIQUID FILLED ORAL at 08:47

## 2024-11-18 RX ADMIN — HYDRALAZINE HYDROCHLORIDE 10 MG: 10 TABLET ORAL at 05:44

## 2024-11-18 RX ADMIN — LABETALOL HYDROCHLORIDE 300 MG: 100 TABLET, FILM COATED ORAL at 08:47

## 2024-11-18 NOTE — ASSESSMENT & PLAN NOTE
Hgb currently 10.2.  Hemoglobin 12.1 on admission.   Most recent iron panel on 10/15: Iron Sat 38%, TIBC 204, Iron 77, and Ferritin 111.  No need for iron supplementation at this time.  FOBT 2 out of 3 negative.  3rd to be obtained.  Completed 3 doses of IV Venofer per Nephrology recs.  Continue to trend routine CBC.

## 2024-11-18 NOTE — DISCHARGE SUMMARY
Discharge Summary - PMR   Name: Tommie Taylor 59 y.o. male I MRN: [id number]  Unit/Bed#: [facility] 261-01 I Date of Admission: 10/26/2024   Date of Service: 11/18/2024 I Hospital Day: 23    Medical Problems       Resolved Problems  Date Reviewed: 11/18/2024   None         Admission Date: 10/26/2024   Discharge Date:     Etiologic/Rehabilitation Diagnosis: Impairment of mobility, safety and Activities of Daily Living (ADLs) due to Stroke:  01.2  Right Body Involvement (Left Brain)    HPI: Tommie Taylor is a 59 y.o. male with history of bipolar disorder, CKD stage IV, type 2 diabetes, hypertension, tardive dyskinesia, sleep apnea, GERD who presented to the [facility] on 10/14/2024 for right-sided weakness and facial droop. MRI revealed a left corona radiata infarct and neurology was consulted and was administered TNK. He initially required a Cardene drip for his hypertensive emergency and was eventually placed on dual antiplatelet therapy with plan to continue aspirin and Plavix until that date and continue with aspirin as monotherapy as well as atorvastatin for secondary stroke prophylaxis. He was recommended for Zio patch placement as an outpatient. His hospital course was complicated by acute kidney injury on top of his CKD thought to be related to ATN in the setting of uncontrolled blood pressure as well as contrast associated nephropathy. Nephrology did follow during his course and had some levels of improvement however has not returned back to his baseline level of creatinine. Additionally there was some levels of encephalopathy which are stable at this time. The patient was evaluated by the Rehabilitation team and deemed an appropriate candidate for comprehensive inpatient rehabilitation and admitted to the [facility] on 10/26/2024 5:13 PM     Procedures Performed During [facility] Admission: none    Acute Rehabilitation Center Course: Patient participated in a comprehensive interdisciplinary  inpatient rehabilitation program which included involvment of MD, therapies (PT, OT, and/or SLP), RN, CM, SW, dietary, and psychology services. He was able to be advanced to an overall supervision level of assist and considered safe for discharge home with family. Please see below for patient's day to day management of medical needs.      Assessment & Plan  CVA (cerebral vascular accident) (HCC)  Patient with uncontrolled hypertension and diabetes presents with right upper and right lower extremity weakness as well as facial droop  A CT of the head as well as a CTA of the head and neck were completed with negative for acute abnormalities for an acute process  TNK had been administered following the correction of his hypertension after being placed on a Cardene drip  After ministration of the TNK developed worsening dysarthria as well as headache and a repeat CT was still negative.  The 24 post TNK CT was also negative  Neurology followed the patient and MRI was completed and was significant for left corona radiata stroke  Placed on dual antiplatelet therapy for 3 weeks with plans for monotherapy with aspirin and statin indefinitely for secondary stroke prophylaxis (10/15-11/5) started on ASA and statin monotherapy   Recommendation for a Zio patch as an outpatient  Follow-up with neurovascular attending in 6 to 8 weeks  Physical, occupational and speech therapy while on the acute rehabilitation unit  Episode of worsening RLE weakness,CTH 11/4 no acute changes.   Bipolar I disorder, severe, current or most recent episode depressed, with psychotic features ([facility])  History of chronic bipolar 1 disorder and follows with outpatient psychiatry and was seen inpatient  Mood has been stable and is maintained on Zyprexa, bupropion and trazodone as well as Ingrezza for tardive dyskinesia  Follow-up with psychiatry as an outpatient and consider virtual consultation if indicated for any changes while on ARC  GERD (gastroesophageal  reflux disease)  Continue Protonix as well as as needed Tums  Insomnia  Continue trazodone and consider sleep logs  Encourage use of CPAP   DAISY (obstructive sleep apnea)  Continue CPAP with home settings  Patient reports that CPAP at home has been broken for several years, encourage patient to obtain another one   Overnight pulse ox 11/16 w.o an desaturation.    Anemia, unspecified  Hemoglobin most recently of 10.2 and has been hovering in the 10-11 range for most of admission  FOBT: neg x3 10/29,11/1,11/4  Started on venofer per nephrology x3 completed   Continue to monitor with biweekly CBC or sooner if clinically indicated  Hyperlipidemia  Continue Lipitor 40 mg every evening  Class 3 severe obesity due to excess calories with serious comorbidity and body mass index (BMI) of 40.0 to 44.9 in adult ([facility])  Continue with exercise and promote lifestyle modification, weight loss counseling and management of medical conditions to optimize status  Tardive dyskinesia  Continue Ingrezza 40 mg at bedtime  Per wife he gets the medicine from [place], requested that she obtain more as he is running out  Hypertension  Presented to the hospital significantly elevated blood pressure with systolic ranging from 197-231  Was placed on a Cardizem drip initially for hypertensive emergency  Had been on Demadex 20 mg daily but was on hold due to the increasing creatinine  Cw labetalol 300 mg BID , nifedipine 90 mg daily dec to 60 mg daily and hydralazine 25mg Q8h- hydral dec to 10 mg Q8h,   Goals for normotension  Mgmt per IM  Follow-up with nephrology as an outpatient  DM2 (diabetes mellitus, type 2) ([facility])  Lab Results   Component Value Date    HGBA1C 8.6 (H) 10/16/2024       Recent Labs     11/16/24  1640 11/17/24  0555 11/17/24  1536 11/18/24  0559   POCGLU 88 88 110 91     Most recent hemoglobin A1c of 8.6 and technically uncontrolled although blood sugars in the hospital have been well-controlled  Currently on semaglutide as  an outpatient but was on hold due to ROSINA in the hospital  Continue sliding scale and 4 times daily Accu-Cheks and as well as a diabetic diet    Acute kidney injury superimposed on stage 4 chronic kidney disease (HCC)  Lab Results   Component Value Date    EGFR 15 11/18/2024    EGFR 15 11/15/2024    EGFR 16 11/14/2024    CREATININE 4.08 (H) 11/18/2024    CREATININE 3.94 (H) 11/15/2024    CREATININE 3.83 (H) 11/14/2024     Recent creatinine of 3.9411/15 Prior baseline around 2.9-3.0  Etiology felt to be related potentially to ATN in the setting of uncontrolled hypertension and complicated by contrast associated nephropathy  Nephrology was consulted and followed and a UA showed microhematuria and proteinuria.  An ultrasound of the kidney/bladder showed wall thickening but no hydronephrosis  Bladder scans negative for retention  Monitor BMP triweekly or sooner if clinically indicated  Demadex has been on hold and had periodically required IV fluids  Nephrology following; placed on low phos diet and started on mag   Follow with nephrology as an outpatient or sooner on the ARC if any acute changes  Encephalopathy  Had lethargy on exam back on 10/17 in acute care with improvements however more recently had issues with confusion and decreased consciousness in the mornings that improves throughout the day with unclear etiology  Prior history of cognitive impairments with last MoCA score of 18  CT of the head was stable, B12 level (446) wnl  Was evaluated by psychiatry with no changes in medications recommended  EEG showed no epileptiform activity just moderate nonspecific dysfunction  Will need to monitor especially with change in environment now on the ARC for any changes  Impaired mobility and activities of daily living  Patient was evaluated by the rehabilitation team MD and an appropriate candidate for acute inpatient rehabilitation program at this time.  The patient will tolerate 3 hours/day 5 to 7 days/week of  intensive physical, occupational and speech therapy in order to obtain goals for community discharge  Due to the patient's functional Compared to their baseline level of function in addition to their ongoing medical needs, the patient would benefit from daily supervision from a rehabilitation physician as well as rehabilitation nursing to implement and adjust the medical as well as functional plan of care in order to meet the patient's goals.  DC: 11/18 homecare PT/OT/SLP/RN/HHA  At risk for alteration in bowel function  Constipated no bm for several days; lactulose PRN  Start colase, senna daily and miralax prn  Titrate as needed   Overgrown toenails  Cs podiatry   Vitamin D deficiency  <7 10/7 Vit D2 50,000 U MWF   Mgmt per IM/nephrology   Fall during current hospitalization  Patient with fall to knees at shift change 10/31   Denied any knee pain  Get larger bariatric chair   Fall precautions.   Orthostatic hypotension  Noted to have orthostatic hypotension during therapy   Apply TEDs/Binder if available for patient size.   Fall precautions  Monitor anemia as above   BP medication titration per IM   Spasticity  With increased flexion tone in RUE  Due to patients kidney function would not be a good candidate for baclofen, and with his episodes of hypotension while at rehab would hold off on starting tizanidine.   Safest option to address patients spasticity would be neurotoxin injections given local effect   Hypomagnesemia  Started on mag gluconate per nephrology   Monitor w/ routine labs     Interval History: Patient seen and examined in room. No events overnight.  Reports overall feeling well. Excited to go home. Last BM 11/18. Sleeping well at night. Reports that allergies are acting up today and having teary eyes. Denies any f/c/n/v, CP, SOB, abdominal pain, constipation, or diarrhea.     Physical Exam    Gen: No acute distress, obese  HEENT: Moist mucus membranes, Normocephalic/Atraumatic  Cardiovascular:  Regular rate, rhythm,   Heme/Extr:mild bilateral LE edema   Pulmonary: Non-labored breathing. Lungs CTAB  : no amezquita  GI: Soft, non-tender, non-distended. BS+  MSK: ROM is WFL in all extremities.See below for MMT scores.   Integumentary: Skin is warm, dry. No rashes or ulcers on visible skin  Neuro: Appropriate to questioning.   CN 2-12 grossly intact ex R facial droop   Sensation intact to light touch throughout.   Speech is fluent, slight dysarthria. Increased elbow flexion tone RUE,      MMT:   Strength:   Right  Left  Site  Right  Left  Site    2 5  S Ab: Shoulder Abductors  4 5  HF: Hip Flexors    1 5  EF: Elbow Flexors  5  5 KF: Knee Flexors    2  5 EE: Elbow Extensors  5  5  KE: Knee Extensors    0  5 WE: Wrist Extensors  5  5  DR: Dorsi Flexors    1 5 FF: Finger Flexors  5  5  PF: Plantar Flexors    0 5 HI: Hand Intrinsics  NT NT EHL: Extensor Hallucis Longus       Significant Findings, Care, Treatment and Services Provided:  Patient participated in a comprehensive physical, occupational and speech therapy program under the guidance of rehab physician and nursing oversight       Complications: Fall during hospitalization,     Functional Status Upon Admission to ARC:  Physical Therapy: Max assist for transfers with no ambulation attempted  Occupational Therapy: Moderate assist for upper body ADLs and lower body ADLs  Speech Therapy: Regular and thin diet       Functional Status Upon Discharge from ARC:   Physical Therapy Occupational Therapy Speech Therapy   Weight Bearing Status: Full Weight Bearing  Transfers: Incidental Touching  Bed Mobility: Incidental Touching  Amulation Distance (ft): 100 feet  Ambulation: Incidental Touching, Minimal Assistance  Assistive Device for Ambulation:  (no device)  Wheelchair Mobility Distance: 50 ft  Wheelchair Mobility: Minimal Assistance  Number of Stairs: 8  Assistive Device for Stairs: Lehft Hand Rail  Stair Assistance: Incidental Touching  Ramp: Minimal  Assistance  Discharge Recommendations: Home with:  DC Home with:: Family Support, Home Physical Therapy   Eating: Supervision (set-up)  Grooming: Supervision  Bathing: Moderate Assistance  Bathing: Moderate Assistance  Upper Body Dressing: Minimal Assistance  Lower Body Dressing: Minimal Assistance (footwear mod A)  Toileting: Moderate Assistance (intermittent incontinence of urine, having BM infrequently whihc he reports is baseline)  Tub/Shower Transfer: Minimal Assistance  Toilet Transfer: Minimal Assistance  Cognition: Exceptions to WNL  Cognition: Decreased Memory, Decreased Executive Functions, Decreased Attention, Decreased Safety, Decreased Comprehension  Orientation: Person, Place, Time, Situation   Mode of Communication: Verbal  Speech/Language: Dysarthia  Cognition: Exceptions to WNL  Cognition: Decreased Memory, Decreased Executive Functions, Decreased Attention, Decreased Comprehension  Orientation: Place, Time, Situation, Person  Discharge Recommendations: Home with:  DC Home with:: 24 Hour Supervision, 24 Hour Assisteance, Family Support, Home Speech Therapy, Outpatient Speech Therapy         Discharge Diagnosis: Impairment of mobility, safety and Activities of Daily Living (ADLs) due to Stroke:  01.2  Right Body Involvement (Left Brain)    Discharge Medications:   See after visit summary for reconciled discharge medications provided to patient and family.      Condition at Discharge: fair     Discharge instructions/Information to patient and family:   See after visit summary for information provided to patient and family.      Provisions for Follow-Up Care:  See after visit summary for information related to follow-up care and any pertinent home health orders.      Future Appointments   Date Time Provider Department Center   11/19/2024 To Be Determined Moi Riddle RN VN HM [facility] VN Home [facility]al   12/30/2024  3:00 PM Too Lockett PSY THER PB   2/5/2025 11:40 AM [facility] ENDOCRINOLOGY [facility] S[facility]  BE MIGEL   2/12/2025  8:40 AM Gurpreet Miranda DO SLEEP [facility] BE MOB   3/18/2025 10:00 AM Elida Nielsen PA-C NEPH [facility]   3/18/2025 10:30 AM Elida Nielsen PA-C NEPH [facility]   3/18/2025  1:30 PM Rosemary Vargas MD NEURO [facility]       Disposition: Home w/ home PT/OT/RN/HHA       Planned Readmission: No    Discharge Statement   I have spent a total time of 48 minutes in caring for this patient on the day of the visit/encounter. >30 minutes of time was spent on: Instructions for management, Counseling / Coordination of care, Documenting in the medical record, Reviewing / ordering tests, medicine, procedures  , and Communicating with other healthcare professionals .    Discharge Medications:  See after visit summary for reconciled discharge medications provided to patient and family.

## 2024-11-18 NOTE — ASSESSMENT & PLAN NOTE
Continue CPAP with home settings  Patient reports that CPAP at home has been broken for several years, encourage patient to obtain another one   Overnight pulse ox 11/16 w.o an desaturation.

## 2024-11-18 NOTE — ASSESSMENT & PLAN NOTE
Hemoglobin most recently of 10.2 and has been hovering in the 10-11 range for most of admission  FOBT: neg x3 10/29,11/1,11/4  Started on venofer per nephrology x3 completed   Continue to monitor with biweekly CBC or sooner if clinically indicated

## 2024-11-18 NOTE — ASSESSMENT & PLAN NOTE
Continue Ingrezza 40 mg at bedtime  Per wife he gets the medicine from [place], requested that she obtain more as he is running out

## 2024-11-18 NOTE — NURSING NOTE
Pt discharged to home with so and father. Discharge instructions reviewed with all, follow up appointments and medications explained and understanding voiced. Encouraged pt to always look for assistance and remain safe with ambulation to prevent injuries. Belongings packed by PCA with family to take. Med from pharmacy returned. Left unit via W/C to car, transferred without any difficulties.

## 2024-11-18 NOTE — PLAN OF CARE
Problem: PAIN - ADULT  Goal: Verbalizes/displays adequate comfort level or baseline comfort level  Description: Interventions:  - Encourage patient to monitor pain and request assistance  - Assess pain using appropriate pain scale  - Administer analgesics based on type and severity of pain and evaluate response  - Implement non-pharmacological measures as appropriate and evaluate response  - Consider cultural and social influences on pain and pain management  - Notify physician/advanced practitioner if interventions unsuccessful or patient reports new pain  11/18/2024 1244 by Jess Cody RN  Outcome: Adequate for Discharge  11/18/2024 0743 by Jess Cody RN  Outcome: Progressing     Problem: INFECTION - ADULT  Goal: Absence or prevention of progression during hospitalization  Description: INTERVENTIONS:  - Assess and monitor for signs and symptoms of infection  - Monitor lab/diagnostic results  - Monitor all insertion sites, i.e. indwelling lines, tubes, and drains  - Monitor endotracheal if appropriate and nasal secretions for changes in amount and color  - [place] appropriate cooling/warming therapies per order  - Administer medications as ordered  - Instruct and encourage patient and family to use good hand hygiene technique  - Identify and instruct in appropriate isolation precautions for identified infection/condition  Outcome: Adequate for Discharge  Goal: Absence of fever/infection during neutropenic period  Description: INTERVENTIONS:  - Monitor WBC    Outcome: Adequate for Discharge     Problem: SAFETY ADULT  Goal: Patient will remain free of falls  Description: INTERVENTIONS:  - Educate patient/family on patient safety including physical limitations  - Instruct patient to call for assistance with activity   - Consult OT/PT to assist with strengthening/mobility   - Keep Call bell within reach  - Keep bed low and locked with side rails adjusted as appropriate  - Keep care items and personal  belongings within reach  - Initiate and maintain comfort rounds  - Make Fall Risk Sign visible to staff  - Offer Toileting every  Hours, in advance of need  - Initiate/Maintain alarm  - Obtain necessary fall risk management equipment:   - Apply yellow socks and bracelet for high fall risk patients  - Consider moving patient to room near nurses station  11/18/2024 1244 by Jess Cody RN  Outcome: Adequate for Discharge  11/18/2024 0743 by Jess Cody RN  Outcome: Progressing  Goal: Maintain or return to baseline ADL function  Description: INTERVENTIONS:  -  Assess patient's ability to carry out ADLs; assess patient's baseline for ADL function and identify physical deficits which impact ability to perform ADLs (bathing, care of mouth/teeth, toileting, grooming, dressing, etc.)  - Assess/evaluate cause of self-care deficits   - Assess range of motion  - Assess patient's mobility; develop plan if impaired  - Assess patient's need for assistive devices and provide as appropriate  - Encourage maximum independence but intervene and supervise when necessary  - Involve family in performance of ADLs  - Assess for home care needs following discharge   - Consider OT consult to assist with ADL evaluation and planning for discharge  - Provide patient education as appropriate  Outcome: Adequate for Discharge  Goal: Maintains/Returns to pre admission functional level  Description: INTERVENTIONS:  - Perform AM-PAC 6 Click Basic Mobility/ Daily Activity assessment daily.  - Set and communicate daily mobility goal to care team and patient/family/caregiver.   - Collaborate with rehabilitation services on mobility goals if consulted  - Perform Range of Motion  times a day.  - Reposition patient every  hours.  - Dangle patient  times a day  - Stand patient  times a day  - Ambulate patient  times a day  - Out of bed to chair  times a day   - Out of bed for meals  times a day  - Out of bed for toileting  - Record patient progress  and toleration of activity level   Outcome: Adequate for Discharge     Problem: DISCHARGE PLANNING  Goal: Discharge to home or other facility with appropriate resources  Description: INTERVENTIONS:  - Identify barriers to discharge w/patient and caregiver  - Arrange for needed discharge resources and transportation as appropriate  - Identify discharge learning needs (meds, wound care, etc.)  - Arrange for interpretive services to assist at discharge as needed  - Refer to Case Management Department for coordinating discharge planning if the patient needs post-hospital services based on physician/advanced practitioner order or complex needs related to functional status, cognitive ability, or social support system  Outcome: Adequate for Discharge     Problem: Knowledge Deficit  Goal: Patient/family/caregiver demonstrates understanding of disease process, treatment plan, medications, and discharge instructions  Description: Complete learning assessment and assess knowledge base.  Interventions:  - Provide teaching at level of understanding  - Provide teaching via preferred learning methods  Outcome: Adequate for Discharge     Problem: NEUROSENSORY - ADULT  Goal: Achieves stable or improved neurological status  Description: INTERVENTIONS  - Monitor and report changes in neurological status  - Monitor vital signs such as temperature, blood pressure, glucose, and any other labs ordered   - Initiate measures to prevent increased intracranial pressure  - Monitor for seizure activity and implement precautions if appropriate      Outcome: Adequate for Discharge  Goal: Remains free of injury related to seizures activity  Description: INTERVENTIONS  - Maintain airway, patient safety  and administer oxygen as ordered  - Monitor patient for seizure activity, document and report duration and description of seizure to physician/advanced practitioner  - If seizure occurs,  ensure patient safety during seizure  - Reorient patient post  seizure  - Seizure pads on all 4 side rails  - Instruct patient/family to notify RN of any seizure activity including if an aura is experienced  - Instruct patient/family to call for assistance with activity based on nursing assessment  - Administer anti-seizure medications if ordered    Outcome: Adequate for Discharge  Goal: Achieves maximal functionality and self care  Description: INTERVENTIONS  - Monitor swallowing and airway patency with patient fatigue and changes in neurological status  - Encourage and assist patient to increase activity and self care.   - Encourage visually impaired, hearing impaired and aphasic patients to use assistive/communication devices  Outcome: Adequate for Discharge     Problem: CARDIOVASCULAR - ADULT  Goal: Maintains optimal cardiac output and hemodynamic stability  Description: INTERVENTIONS:  - Monitor I/O, vital signs and rhythm  - Monitor for S/S and trends of decreased cardiac output  - Administer and titrate ordered vasoactive medications to optimize hemodynamic stability  - Assess quality of pulses, skin color and temperature  - Assess for signs of decreased coronary artery perfusion  - Instruct patient to report change in severity of symptoms  Outcome: Adequate for Discharge  Goal: Absence of cardiac dysrhythmias or at baseline rhythm  Description: INTERVENTIONS:  - Continuous cardiac monitoring, vital signs, obtain 12 lead EKG if ordered  - Administer antiarrhythmic and heart rate control medications as ordered  - Monitor electrolytes and administer replacement therapy as ordered  Outcome: Adequate for Discharge     Problem: RESPIRATORY - ADULT  Goal: Achieves optimal ventilation and oxygenation  Description: INTERVENTIONS:  - Assess for changes in respiratory status  - Assess for changes in mentation and behavior  - Position to facilitate oxygenation and minimize respiratory effort  - Oxygen administered by appropriate delivery if ordered  - Initiate smoking cessation  education as indicated  - Encourage broncho-pulmonary hygiene including cough, deep breathe, Incentive Spirometry  - Assess the need for suctioning and aspirate as needed  - Assess and instruct to report SOB or any respiratory difficulty  - Respiratory Therapy support as indicated  Outcome: Adequate for Discharge     Problem: METABOLIC, FLUID AND ELECTROLYTES - ADULT  Goal: Electrolytes maintained within normal limits  Description: INTERVENTIONS:  - Monitor labs and assess patient for signs and symptoms of electrolyte imbalances  - Administer electrolyte replacement as ordered  - Monitor response to electrolyte replacements, including repeat lab results as appropriate  - Instruct patient on fluid and nutrition as appropriate  Outcome: Adequate for Discharge  Goal: Fluid balance maintained  Description: INTERVENTIONS:  - Monitor labs   - Monitor I/O and WT  - Instruct patient on fluid and nutrition as appropriate  - Assess for signs & symptoms of volume excess or deficit  Outcome: Adequate for Discharge  Goal: Glucose maintained within target range  Description: INTERVENTIONS:  - Monitor Blood Glucose as ordered  - Assess for signs and symptoms of hyperglycemia and hypoglycemia  - Administer ordered medications to maintain glucose within target range  - Assess nutritional intake and initiate nutrition service referral as needed  Outcome: Adequate for Discharge     Problem: MUSCULOSKELETAL - ADULT  Goal: Maintain or return mobility to safest level of function  Description: INTERVENTIONS:  - Assess patient's ability to carry out ADLs; assess patient's baseline for ADL function and identify physical deficits which impact ability to perform ADLs (bathing, care of mouth/teeth, toileting, grooming, dressing, etc.)  - Assess/evaluate cause of self-care deficits   - Assess range of motion  - Assess patient's mobility  - Assess patient's need for assistive devices and provide as appropriate  - Encourage maximum independence  but intervene and supervise when necessary  - Involve family in performance of ADLs  - Assess for home care needs following discharge   - Consider OT consult to assist with ADL evaluation and planning for discharge  - Provide patient education as appropriate  Outcome: Adequate for Discharge  Goal: Maintain proper alignment of affected body part  Description: INTERVENTIONS:  - Support, maintain and protect limb and body alignment  - Provide patient/ family with appropriate education  Outcome: Adequate for Discharge     Problem: MOBILITY - ADULT  Goal: Maintain or return to baseline ADL function  Description: INTERVENTIONS:  -  Assess patient's ability to carry out ADLs; assess patient's baseline for ADL function and identify physical deficits which impact ability to perform ADLs (bathing, care of mouth/teeth, toileting, grooming, dressing, etc.)  - Assess/evaluate cause of self-care deficits   - Assess range of motion  - Assess patient's mobility; develop plan if impaired  - Assess patient's need for assistive devices and provide as appropriate  - Encourage maximum independence but intervene and supervise when necessary  - Involve family in performance of ADLs  - Assess for home care needs following discharge   - Consider OT consult to assist with ADL evaluation and planning for discharge  - Provide patient education as appropriate  Outcome: Adequate for Discharge  Goal: Maintains/Returns to pre admission functional level  Description: INTERVENTIONS:  - Perform AM-PAC 6 Click Basic Mobility/ Daily Activity assessment daily.  - Set and communicate daily mobility goal to care team and patient/family/caregiver.   - Collaborate with rehabilitation services on mobility goals if consulted  - Perform Range of Motion  times a day.  - Reposition patient every  hours.  - Dangle patient  times a day  - Stand patient  times a day  - Ambulate patient  times a day  - Out of bed to chair  times a day   - Out of bed for meals  times  a day  - Out of bed for toileting  - Record patient progress and toleration of activity level   Outcome: Adequate for Discharge     Problem: Potential for Falls  Goal: Patient will remain free of falls  Description: INTERVENTIONS:  - Educate patient/family on patient safety including physical limitations  - Instruct patient to call for assistance with activity   - Consult OT/PT to assist with strengthening/mobility   - Keep Call bell within reach  - Keep bed low and locked with side rails adjusted as appropriate  - Keep care items and personal belongings within reach  - Initiate and maintain comfort rounds  - Make Fall Risk Sign visible to staff  - Offer Toileting every  Hours, in advance of need  - Initiate/Maintain alarm  - Obtain necessary fall risk management equipment:   - Apply yellow socks and bracelet for high fall risk patients  - Consider moving patient to room near nurses station  Outcome: Adequate for Discharge     Problem: Prexisting or High Potential for Compromised Skin Integrity  Goal: Skin integrity is maintained or improved  Description: INTERVENTIONS:  - Identify patients at risk for skin breakdown  - Assess and monitor skin integrity  - Assess and monitor nutrition and hydration status  - Monitor labs   - Assess for incontinence   - Turn and reposition patient  - Assist with mobility/ambulation  - Relieve pressure over bony prominences  - Avoid friction and shearing  - Provide appropriate hygiene as needed including keeping skin clean and dry  - Evaluate need for skin moisturizer/barrier cream  - Collaborate with interdisciplinary team   - Patient/family teaching  - Consider wound care consult   Outcome: Adequate for Discharge     Problem: MOBILITY - ADULT  Goal: Maintain or return to baseline ADL function  Description: INTERVENTIONS:  -  Assess patient's ability to carry out ADLs; assess patient's baseline for ADL function and identify physical deficits which impact ability to perform ADLs  (bathing, care of mouth/teeth, toileting, grooming, dressing, etc.)  - Assess/evaluate cause of self-care deficits   - Assess range of motion  - Assess patient's mobility; develop plan if impaired  - Assess patient's need for assistive devices and provide as appropriate  - Encourage maximum independence but intervene and supervise when necessary  - Involve family in performance of ADLs  - Assess for home care needs following discharge   - Consider OT consult to assist with ADL evaluation and planning for discharge  - Provide patient education as appropriate  Outcome: Adequate for Discharge  Goal: Maintains/Returns to pre admission functional level  Description: INTERVENTIONS:  - Perform AM-PAC 6 Click Basic Mobility/ Daily Activity assessment daily.  - Set and communicate daily mobility goal to care team and patient/family/caregiver.   - Collaborate with rehabilitation services on mobility goals if consulted  - Perform Range of Motion  times a day.  - Reposition patient every  hours.  - Dangle patient  times a day  - Stand patient  times a day  - Ambulate patient  times a day  - Out of bed to chair  times a day   - Out of bed for meals  times a day  - Out of bed for toileting  - Record patient progress and toleration of activity level   Outcome: Adequate for Discharge     Problem: GENITOURINARY - ADULT  Goal: Maintains or returns to baseline urinary function  Description: INTERVENTIONS:  - Assess urinary function  - Encourage oral fluids to ensure adequate hydration if ordered  - Administer IV fluids as ordered to ensure adequate hydration  - Administer ordered medications as needed  - Offer frequent toileting  - Follow urinary retention protocol if ordered  Outcome: Adequate for Discharge

## 2024-11-18 NOTE — ASSESSMENT & PLAN NOTE
Lab Results   Component Value Date    HGBA1C 8.6 (H) 10/16/2024       Recent Labs     11/16/24  1640 11/17/24  0555 11/17/24  1536 11/18/24  0559   POCGLU 88 88 110 91       Blood Sugar Average: Last 72 hrs:  (P) 116.25    Had not been taking anything at home.  Ran out of Ozempic a few months ago.  BG has been well controlled.  Continue cons. carb diet and BID accuchecks.

## 2024-11-18 NOTE — ASSESSMENT & PLAN NOTE
Lab Results   Component Value Date    EGFR 15 11/18/2024    EGFR 15 11/15/2024    EGFR 16 11/14/2024    CREATININE 4.08 (H) 11/18/2024    CREATININE 3.94 (H) 11/15/2024    CREATININE 3.83 (H) 11/14/2024     Recent creatinine of 3.9411/15 Prior baseline around 2.9-3.0  Etiology felt to be related potentially to ATN in the setting of uncontrolled hypertension and complicated by contrast associated nephropathy  Nephrology was consulted and followed and a UA showed microhematuria and proteinuria.  An ultrasound of the kidney/bladder showed wall thickening but no hydronephrosis  Bladder scans negative for retention  Monitor BMP triweekly or sooner if clinically indicated  Demadex has been on hold and had periodically required IV fluids  Nephrology following; placed on low phos diet and started on mag   Follow with nephrology as an outpatient or sooner on the [facility] if any acute changes

## 2024-11-18 NOTE — ASSESSMENT & PLAN NOTE
Lab Results   Component Value Date    HGBA1C 8.6 (H) 10/16/2024       Recent Labs     11/16/24  1640 11/17/24  0555 11/17/24  1536 11/18/24  0559   POCGLU 88 88 110 91     Most recent hemoglobin A1c of 8.6 and technically uncontrolled although blood sugars in the hospital have been well-controlled  Currently on semaglutide as an outpatient but was on hold due to ROSINA in the hospital  Continue sliding scale and 4 times daily Accu-Cheks and as well as a diabetic diet

## 2024-11-18 NOTE — TELEPHONE ENCOUNTER
----- Message from Zari Padilla DO sent at 11/18/2024  9:27 AM EST -----  Regarding: ROSINA hospital f/u appt  Patient has been discharged from [facility] and placed on [facility] list. Please schedule hospital follow up for ROSINA in 1 week with first available provider, preferably Dr. No. Patient should have BMP prior to office visit which has already been ordered. Thanks.

## 2024-11-18 NOTE — ASSESSMENT & PLAN NOTE
Continue use of CPAP with home settings.  Does not use CPAP at home - currently broken.  Overnight noc ox on 11/16 showed no desaturations <88%.  Follow-up with Sleep Medicine as outpatient.

## 2024-11-18 NOTE — CASE MANAGEMENT
Patient progressed well in rehab. Patient discharged to home with wife, RAZ reserved forRN/PT/OT/ST/HHA. No home oxygen needed.

## 2024-11-18 NOTE — ASSESSMENT & PLAN NOTE
Lab Results   Component Value Date    EGFR 15 11/18/2024    EGFR 15 11/15/2024    EGFR 16 11/14/2024    CREATININE 4.08 (H) 11/18/2024    CREATININE 3.94 (H) 11/15/2024    CREATININE 3.83 (H) 11/14/2024     Creatinine currently 4.08 from 3.94.  Baseline creatinine 2.5-2.9.    Avoid nephrotoxins.  Home diuretics currently on hold.  Encourage hydration.  Renal ultrasound demonstrates no hydro but mild bladder wall thickening.   Nephrology following.  Labs MWF per Nephro.  Follows with Dr. No (Nephrology) as outpatient.  Obtain BMP on Thursday as outpatient.  Follow-up with Dr. No in next 1-2 weeks.

## 2024-11-18 NOTE — PROGRESS NOTES
Progress Note - Internal Medicine   Name: Tommie Taylor 59 y.o. male I MRN: [id number]  Unit/Bed#: -01 I Date of Admission: 10/26/2024   Date of Service: 11/18/2024 I Hospital Day: 23    Assessment & Plan  CVA (cerebral vascular accident) (HCC)  Initially presented with R sided weakness and R facial droop.   CTH and CTA negative for acute process.   MRI showed left corona radiata stroke.   Received TNK.    Completed 3 weeks of DAPT on 11/6.  Continue with ASA 81mg daily.  Continue statin.    Neurovascular checks Q shift.  Maintain normotension.    Follow up with Neurovascular in 4-6 weeks as outpatient.  Recommending Zio patch/Loop.     PT/OT/ST per primary team.     Complaints of worsening RLE weakness on 11/4 after therapies - CTH stable.  Noted to have orthostasis - recommend obtaining higher BP to prevent symptoms.  Bipolar I disorder, severe, current or most recent episode depressed, with psychotic features ([facility])  Currently on bupropion 100mg BID and olanzapine 10mg at HS with valbenazine tosylate 40mg for tardive dyskinesia per home regimen. Follow up outpatient with psychiatry.   GERD (gastroesophageal reflux disease)  EGD with GI 10/14, directed to continue with Protonix. Tums available PRN for dyspepsia.   Insomnia  Educate and encourage on sleep hygiene. Continue Trazodone at HS for sleep.   DAISY (obstructive sleep apnea)  Continue use of CPAP with home settings.  Does not use CPAP at home - currently broken.  Overnight noc ox on 11/16 showed no desaturations <88%.  Follow-up with Sleep Medicine as outpatient.  Anemia, unspecified  Hgb currently 10.2.  Hemoglobin 12.1 on admission.   Most recent iron panel on 10/15: Iron Sat 38%, TIBC 204, Iron 77, and Ferritin 111.  No need for iron supplementation at this time.  FOBT 2 out of 3 negative.  3rd to be obtained.  Completed 3 doses of IV Venofer per Nephrology recs.  Continue to trend routine CBC.  Hyperlipidemia  Most recent lipid panel 10/16/24:  Cholesterol 154, triglycerides 120, HDL 40, LDL 90.  Continue Lipitor 40mg daily, increased from home 20mg daily.  Class 3 severe obesity due to excess calories with serious comorbidity and body mass index (BMI) of 40.0 to 44.9 in adult ([facility])  BMI 42.74 on admission. Encourage lifestyle modifications and provide support for nutritional teaching. Consult placed to nutrition services during acute course.   Tardive dyskinesia  Continue on home treatment Valbenazine Tosylate 40mg HS.   Hypertension  Presented with hypertensive emergency on initial hospitalization.   Concerns about medication non-compliance.  Home regimen: labetalol 100mg BID and amlodipine 10mg daily.  Currently on labetalol 300mg BID, Nifedipine 60mg daily, and hydralazine 10mg Q8 hours.  Nephrology following.   DM2 (diabetes mellitus, type 2) ([facility])  Lab Results   Component Value Date    HGBA1C 8.6 (H) 10/16/2024       Recent Labs     11/16/24  1640 11/17/24  0555 11/17/24  1536 11/18/24  0559   POCGLU 88 88 110 91       Blood Sugar Average: Last 72 hrs:  (P) 116.25    Had not been taking anything at home.  Ran out of Ozempic a few months ago.  BG has been well controlled.  Continue cons. carb diet and BID accuchecks.    Acute kidney injury superimposed on stage 4 chronic kidney disease ([facility])  Lab Results   Component Value Date    EGFR 15 11/18/2024    EGFR 15 11/15/2024    EGFR 16 11/14/2024    CREATININE 4.08 (H) 11/18/2024    CREATININE 3.94 (H) 11/15/2024    CREATININE 3.83 (H) 11/14/2024     Creatinine currently 4.08 from 3.94.  Baseline creatinine 2.5-2.9.    Avoid nephrotoxins.  Home diuretics currently on hold.  Encourage hydration.  Renal ultrasound demonstrates no hydro but mild bladder wall thickening.   Nephrology following.  Labs MWF per Nephro.  Follows with Dr. No (Nephrology) as outpatient.  Obtain BMP on Thursday as outpatient.  Follow-up with Dr. No in next 1-2 weeks.  Encephalopathy  Lethargy and encephalopathic symptoms noted  during acute course.  B12 and head CT WNL. No subsequent episodes noted.   EEG on 10/22 consistent with moderate nonspecific cerebral dysfunction, no seizure activity.  Continue to monitor behavior and symptoms for signs of changes.   Most recent MoCA was  in 2024.  Per Psych - if continues to have delirium consider discontinuing Wellbutrin.  Concerns DAISY could also be contributing to encephalopathy per acute care.  Vitamin D deficiency  Vitamin D level <7 on 10/7.  Continue home vitamin D 50,000u weekly.  Continue to follow-up with Nephrology as outpatient.  Orthostatic hypotension  Orthostatic + on .  Continue to monitor orthostatics.  Apply abdominal binder/TEDs as needed.  Encourage PO hydration.  Chronic kidney disease-mineral and bone disorder (CKD-MBD)  Phosphorus 5.1 on 11/15.  Started on low phosphorus diet.  PTH and Vitamin D pending.  Nephrology consulted and following.  Hypomagnesemia  Mg 1.7 on .  Continue magnesium gluconate 500mg daily per Nephro.    VTE Pharmacologic Prophylaxis:   Pharmacologic: Heparin  Mechanical VTE Prophylaxis in Place: Yes - sequential compression devices.    Current Length of Stay: 23 day(s)    Current Patient Status: Inpatient Rehab     Discharge Plan: As per primary team.    Code Status: Level 1 - Full Code    Subjective:   Pt examined while pt sitting in recliner in pt room.  Currently has no complaints.  RUE weakness has gradually improved since being in the hospital.  Denies any spasms or stiffness to RUE at this time.  Slept well last night.  Discussed overnight noc ox results and that pt should still be evaluated by Sleep Medicine to consider obtaining a new CPAP.  Denies any lightheadedness, dizziness, SOB, palpitations, or CP.  Plans for discharge later today.  Currently has no questions or concerns in regards to discharge.     Objective:     Vitals:   Temp (24hrs), Av.9 °F (36.1 °C), Min:96.6 °F (35.9 °C), Max:97.6 °F (36.4 °C)    Temp:  [96.6 °F  (35.9 °C)-97.6 °F (36.4 °C)] 96.6 °F (35.9 °C)  HR:  [63-78] 72  Resp:  [18-19] 18  BP: (150-165)/(79-98) 156/87  SpO2:  [97 %-99 %] 97 %  Body mass index is 43.47 kg/m².     Review of Systems   Constitutional:  Negative for appetite change, chills, fatigue and fever.   HENT:  Negative for trouble swallowing.    Eyes:  Negative for visual disturbance.   Respiratory:  Negative for cough, shortness of breath, wheezing and stridor.    Cardiovascular:  Negative for chest pain, palpitations and leg swelling.   Gastrointestinal:  Negative for abdominal distention, abdominal pain, constipation, diarrhea, nausea and vomiting.        LBM 11/15   Genitourinary:  Negative for difficulty urinating.   Musculoskeletal:  Negative for arthralgias, back pain and gait problem.   Neurological:  Positive for weakness (gradually improving RUE weakness since being in [facility]). Negative for dizziness, light-headedness, numbness and headaches.   Psychiatric/Behavioral:  Negative for dysphoric mood and sleep disturbance. The patient is not nervous/anxious.    All other systems reviewed and are negative.       Input and Output Summary (last 24 hours):       Intake/Output Summary (Last 24 hours) at 11/18/2024 0933  Last data filed at 11/18/2024 0654  Gross per 24 hour   Intake 780 ml   Output 1150 ml   Net -370 ml       Physical Exam:     Physical Exam  Vitals and nursing note reviewed.   Constitutional:       General: He is not in acute distress.     Appearance: Normal appearance. He is obese. He is not ill-appearing.   HENT:      Head: Normocephalic and atraumatic.   Cardiovascular:      Rate and Rhythm: Normal rate and regular rhythm.      Pulses: Normal pulses.      Heart sounds: Normal heart sounds. No murmur heard.     No friction rub.   Pulmonary:      Effort: Pulmonary effort is normal. No respiratory distress.      Breath sounds: Normal breath sounds. No wheezing or rhonchi.   Abdominal:      General: Abdomen is flat. Bowel sounds are  normal. There is no distension.      Palpations: Abdomen is soft. There is no mass.      Tenderness: There is no abdominal tenderness. There is no guarding or rebound.      Hernia: No hernia is present.   Musculoskeletal:      Cervical back: Normal range of motion and neck supple. No tenderness.      Right lower leg: Edema (Minimal non-pitting edema) present.      Left lower leg: Edema (Minimal non-pitting edema) present.   Skin:     General: Skin is warm and dry.   Neurological:      Mental Status: He is alert and oriented to person, place, and time.      Cranial Nerves: Dysarthria and facial asymmetry present.      Motor: Weakness (RUE strength 4/5) present.   Psychiatric:         Mood and Affect: Mood normal.         Behavior: Behavior normal.         Additional Data:     Labs:    Results from last 7 days   Lab Units 11/18/24  0522   WBC Thousand/uL 6.27   HEMOGLOBIN g/dL 10.2*   HEMATOCRIT % 30.6*   PLATELETS Thousands/uL 203   SEGS PCT % 51   LYMPHO PCT % 31   MONO PCT % 12   EOS PCT % 4     Results from last 7 days   Lab Units 11/18/24  0522   SODIUM mmol/L 137   POTASSIUM mmol/L 4.5   CHLORIDE mmol/L 106   CO2 mmol/L 22   BUN mg/dL 42*   CREATININE mg/dL 4.08*   ANION GAP mmol/L 9   CALCIUM mg/dL 9.1   GLUCOSE RANDOM mg/dL 80         Results from last 7 days   Lab Units 11/18/24  0559 11/17/24  1536 11/17/24  0555 11/16/24  1640 11/16/24  0622 11/15/24  1958 11/15/24  1556 11/15/24  0605 11/14/24  1605 11/14/24  0542 11/13/24  1635 11/13/24  0626   POC GLUCOSE mg/dl 91 110 88 88 141* 121 205* 86 95 110 109 103               Labs reviewed    Imaging:    Imaging reviewed    Recent Cultures (last 7 days):           Last 24 Hours Medication List:   Current Facility-Administered Medications   Medication Dose Route Frequency Provider Last Rate    acetaminophen  650 mg Oral Q6H PRN Alex S Ariel, DO      aspirin  81 mg Oral Daily Alex S Ariel, DO      atorvastatin  40 mg Oral QPM Alex S Ariel, DO      buPROPion   100 mg Oral BID Alex S George, DO      calcium carbonate  1,000 mg Oral BID PRN Alex S George, DO      docusate sodium  100 mg Oral BID Cathy Miranda MD      ergocalciferol  50,000 Units Oral Once per day on Monday Wednesday Friday SAMANTHA Kern      fluticasone  1 spray Each Nare Daily SAMANTHA Kern      heparin (porcine)  7,500 Units Subcutaneous Q8H MIGEL Alex S George, DO      hydrALAZINE  10 mg Oral Q8H MIGEL Joe Garcia, SAMANTHA      labetalol  300 mg Oral BID Alex S George, DO      lactulose  20 g Oral TID PRN Cathy Miranda MD      loratadine  5 mg Oral Daily SAMANTHA Kern      magnesium gluconate  500 mg Oral Daily Anahi O'Rufino, CRNP      melatonin  3 mg Oral HS Joe Garcia, SAMANTHA      NIFEdipine  60 mg Oral Daily SAMANTHA Kern      OLANZapine  10 mg Oral HS Alex S George, DO      ondansetron  4 mg Oral Q6H PRN Joe Garcia, SAMANTHA      pantoprazole  40 mg Oral Early Morning Alex S George, DO      polyethylene glycol  17 g Oral Daily Cathy Miranda MD      senna  2 tablet Oral HS Cathy Miranda MD      traZODone  50 mg Oral HS PRN Alex S George, DO      Valbenazine Tosylate  40 mg Oral HS Alex S George, DO          M*Modal software was used to dictate this note.  It may contain errors with dictating incorrect words or incorrect spelling. Please contact the provider directly with any questions.

## 2024-11-19 ENCOUNTER — TRANSITIONAL CARE MANAGEMENT (OUTPATIENT)
Dept: INTERNAL MEDICINE CLINIC | Facility: CLINIC | Age: 59
End: 2024-11-19

## 2024-11-19 ENCOUNTER — HOME CARE VISIT (OUTPATIENT)
Dept: HOME HEALTH SERVICES | Facility: HOME HEALTHCARE | Age: 59
End: 2024-11-19
Payer: MEDICARE

## 2024-11-19 VITALS
RESPIRATION RATE: 16 BRPM | DIASTOLIC BLOOD PRESSURE: 68 MMHG | OXYGEN SATURATION: 98 % | HEART RATE: 89 BPM | TEMPERATURE: 98 F | SYSTOLIC BLOOD PRESSURE: 146 MMHG

## 2024-11-19 PROCEDURE — G0299 HHS/HOSPICE OF RN EA 15 MIN: HCPCS

## 2024-11-19 PROCEDURE — 10330081 VN NO-PAY CLAIM PROCEDURE

## 2024-11-19 PROCEDURE — 400013 VN SOC

## 2024-11-19 NOTE — PROGRESS NOTES
11/19/24 1229   Hello, [Guardian’s Name / Patient’s Name], this is [Caller Name] from [facility], and our clinical care team wanted to check on you / your child after your recent visit to the hospital. It will only take 3-5 minutes. Is this a good time?   Discharge Call Type/ Specific Diagnosis: [facility] Stroke   [facility] Stroke Follow-up   [facility] Stroke Follow-Up Time Frame 5 Day follow up   [facility] 5 Day Stroke General Follow- up Questions   Patient location at time of call Home   Are you/ your loved one taking all medications as prescribed? Yes   Do you have questions about your medications? No   Are you/ your loved ones having any unusual symptoms or problems? No   Follow up appointments   Follow up appointment with PCP Future appointment scheduled   Is there anything preventing you from keeping this appointment? No   Healthy Lifestyle   Are you participating in ongoing therapy? Yes   [facility] discharge phone call follow ups and opportunities   Is there a need for follow up? No   Call Complete   Discharge phone call complete? Complete

## 2024-11-20 ENCOUNTER — HOME CARE VISIT (OUTPATIENT)
Dept: HOME HEALTH SERVICES | Facility: HOME HEALTHCARE | Age: 59
End: 2024-11-20
Payer: MEDICARE

## 2024-11-20 NOTE — CASE COMMUNICATION
Medication discrepancies or Major drug interactions na  Abnormal clinical findings limited endurance  This report is informational only, no response is needed  [facility] has Admitted your patient to Home Health service with the following disciplines: SN, PT and OT  Patient stated goals of care be able to manage at home  Potential barriers to goal achievement limited endurance sob with exertion  Primary focus of home health care:Cali rological  Anticipated visit pattern and next visit date 11/21 2w2 1w1 needs labs on 11/21  Thank you for allowing us to participate in the care of your patient.      Spring Fofana RN [facility]

## 2024-11-20 NOTE — TELEPHONE ENCOUNTER
Pt wife calling he was supposed to have appt tomorrow. Pt is scheduled for 1/6/25.    Pt needs a 1 week follow up. Pt is having labs drawn tomorrow.     Please advise on hospital follow up

## 2024-11-21 ENCOUNTER — OFFICE VISIT (OUTPATIENT)
Dept: INTERNAL MEDICINE CLINIC | Facility: CLINIC | Age: 59
End: 2024-11-21

## 2024-11-21 ENCOUNTER — LAB REQUISITION (OUTPATIENT)
Dept: LAB | Facility: HOSPITAL | Age: 59
End: 2024-11-21
Payer: MEDICARE

## 2024-11-21 ENCOUNTER — HOME CARE VISIT (OUTPATIENT)
Dept: HOME HEALTH SERVICES | Facility: HOME HEALTHCARE | Age: 59
End: 2024-11-21
Payer: MEDICARE

## 2024-11-21 ENCOUNTER — PATIENT OUTREACH (OUTPATIENT)
Dept: INTERNAL MEDICINE CLINIC | Facility: CLINIC | Age: 59
End: 2024-11-21

## 2024-11-21 VITALS
HEART RATE: 88 BPM | TEMPERATURE: 98.6 F | DIASTOLIC BLOOD PRESSURE: 98 MMHG | OXYGEN SATURATION: 99 % | SYSTOLIC BLOOD PRESSURE: 148 MMHG | RESPIRATION RATE: 20 BRPM

## 2024-11-21 VITALS
SYSTOLIC BLOOD PRESSURE: 169 MMHG | TEMPERATURE: 98 F | HEART RATE: 84 BPM | BODY MASS INDEX: 45.16 KG/M2 | WEIGHT: 279.8 LBS | DIASTOLIC BLOOD PRESSURE: 98 MMHG

## 2024-11-21 DIAGNOSIS — M89.9 CHRONIC KIDNEY DISEASE-MINERAL AND BONE DISORDER (CKD-MBD): ICD-10-CM

## 2024-11-21 DIAGNOSIS — Z23 ENCOUNTER FOR IMMUNIZATION: ICD-10-CM

## 2024-11-21 DIAGNOSIS — N18.4 TYPE 2 DIABETES MELLITUS WITH STAGE 4 CHRONIC KIDNEY DISEASE AND HYPERTENSION (HCC): ICD-10-CM

## 2024-11-21 DIAGNOSIS — N18.9 CHRONIC KIDNEY DISEASE-MINERAL AND BONE DISORDER (CKD-MBD): ICD-10-CM

## 2024-11-21 DIAGNOSIS — N17.9 ACUTE KIDNEY FAILURE, UNSPECIFIED (HCC): ICD-10-CM

## 2024-11-21 DIAGNOSIS — I12.9 TYPE 2 DIABETES MELLITUS WITH STAGE 4 CHRONIC KIDNEY DISEASE AND HYPERTENSION (HCC): ICD-10-CM

## 2024-11-21 DIAGNOSIS — E11.22 TYPE 2 DIABETES MELLITUS WITH STAGE 4 CHRONIC KIDNEY DISEASE AND HYPERTENSION (HCC): ICD-10-CM

## 2024-11-21 DIAGNOSIS — N18.4 CHRONIC KIDNEY DISEASE, STAGE 4 (SEVERE) (HCC): ICD-10-CM

## 2024-11-21 DIAGNOSIS — Z76.89 ENCOUNTER FOR SUPPORT AND COORDINATION OF TRANSITION OF CARE: Primary | ICD-10-CM

## 2024-11-21 DIAGNOSIS — Z23 NEED FOR COVID-19 VACCINE: ICD-10-CM

## 2024-11-21 DIAGNOSIS — I63.9 CEREBROVASCULAR ACCIDENT (CVA), UNSPECIFIED MECHANISM (HCC): ICD-10-CM

## 2024-11-21 DIAGNOSIS — Z59.86 PATIENT CANNOT AFFORD MEDICATIONS: ICD-10-CM

## 2024-11-21 DIAGNOSIS — G63 POLYNEUROPATHY ASSOCIATED WITH UNDERLYING DISEASE (HCC): ICD-10-CM

## 2024-11-21 DIAGNOSIS — I10 PRIMARY HYPERTENSION: ICD-10-CM

## 2024-11-21 DIAGNOSIS — E11.65 TYPE 2 DIABETES MELLITUS WITH HYPERGLYCEMIA, UNSPECIFIED WHETHER LONG TERM INSULIN USE (HCC): ICD-10-CM

## 2024-11-21 DIAGNOSIS — E83.9 CHRONIC KIDNEY DISEASE-MINERAL AND BONE DISORDER (CKD-MBD): ICD-10-CM

## 2024-11-21 LAB
ANION GAP SERPL CALCULATED.3IONS-SCNC: 6 MMOL/L (ref 4–13)
BUN SERPL-MCNC: 36 MG/DL (ref 5–25)
CALCIUM SERPL-MCNC: 8.3 MG/DL (ref 8.4–10.2)
CHLORIDE SERPL-SCNC: 109 MMOL/L (ref 96–108)
CO2 SERPL-SCNC: 22 MMOL/L (ref 21–32)
CREAT SERPL-MCNC: 3.49 MG/DL (ref 0.6–1.3)
GFR SERPL CREATININE-BSD FRML MDRD: 18 ML/MIN/1.73SQ M
GLUCOSE SERPL-MCNC: 160 MG/DL (ref 65–140)
POTASSIUM SERPL-SCNC: 4.7 MMOL/L (ref 3.5–5.3)
SODIUM SERPL-SCNC: 137 MMOL/L (ref 135–147)

## 2024-11-21 PROCEDURE — 80048 BASIC METABOLIC PNL TOTAL CA: CPT | Performed by: INTERNAL MEDICINE

## 2024-11-21 PROCEDURE — G0299 HHS/HOSPICE OF RN EA 15 MIN: HCPCS

## 2024-11-21 PROCEDURE — 99495 TRANSJ CARE MGMT MOD F2F 14D: CPT | Performed by: HOSPITALIST

## 2024-11-21 RX ORDER — HYDRALAZINE HYDROCHLORIDE 10 MG/1
10 TABLET, FILM COATED ORAL EVERY 8 HOURS SCHEDULED
Qty: 90 TABLET | Refills: 0 | Status: SHIPPED | OUTPATIENT
Start: 2024-11-21

## 2024-11-21 RX ORDER — ATORVASTATIN CALCIUM 20 MG/1
20 TABLET, FILM COATED ORAL DAILY
Qty: 30 TABLET | Refills: 0 | Status: SHIPPED | OUTPATIENT
Start: 2024-11-21 | End: 2024-12-21

## 2024-11-21 RX ORDER — NIFEDIPINE 30 MG/1
60 TABLET, EXTENDED RELEASE ORAL DAILY
Qty: 60 TABLET | Refills: 0 | Status: SHIPPED | OUTPATIENT
Start: 2024-11-21

## 2024-11-21 RX ORDER — ATENOLOL 25 MG/1
25 TABLET ORAL DAILY
Qty: 30 TABLET | Refills: 0 | Status: SHIPPED | OUTPATIENT
Start: 2024-11-21 | End: 2024-12-21

## 2024-11-21 RX ORDER — HUMAN INSULIN 100 [IU]/ML
INJECTION, SUSPENSION SUBCUTANEOUS
Qty: 3 ML | Refills: 1 | Status: SHIPPED | OUTPATIENT
Start: 2024-11-21

## 2024-11-21 RX ORDER — ATENOLOL 25 MG/1
25 TABLET ORAL DAILY
Qty: 30 TABLET | Refills: 5 | Status: SHIPPED | OUTPATIENT
Start: 2024-11-21 | End: 2024-11-21

## 2024-11-21 SDOH — ECONOMIC STABILITY - INCOME SECURITY: FINANCIAL INSECURITY: Z59.86

## 2024-11-21 NOTE — PROGRESS NOTES
Outpatient Care Management Note:    Re:  direct provider referral - diabetes    Patient was referred to outpatient nurse care management directly by provider Dr. Westfall for further management of his diabetes.     Patient at PCP office today for a transition of care appointment. Patient was at [facility] from 10/14-10/26/24 for cerebral vascular accident (CVA). MRI revealed left corona radiata stroke. Patient has history of obesity, hypertension, uncontrolled diabetes, CKD 4, DAISY, hyperlipidemia, and bipolar I. Patient has right sided weakness/paresis. Patient did go to [facility] acute rehab from 10/26-11/18/24. Patient was discharged home with [facility] for nursing, PT/OT, and ST. Patient to follow up with Neurology and Nephrology.     I met with patient after his appointment today. Present with him is his step daughter Ewa and her 2 year old son. I explained my role and reason for outreach.     Per provider patient having difficulty affording medications. He is unsure if he has a current drug plan and shares he may not of been paying his premium. Patient encouraged to follow up with financial counselors and also reminded of medicare enrollment period for changing plans. Encouraged to call insurance get more info regarding drug plan. For now patient is requesting medications be sent to [facility]. Patient was referred to Bettina Rubalcava, medication  (MPAP) to see if patient qualifies for any assistance with insulin.     Patient believes he is over income for medical assistance.     Labetalol changed to Atenolol 25 mg daily for cost effectiveness.   Patient started on Relion 70/30 insulin ordered today 20 units in AM and 10 units in PM    Patient reports to me that he feels he can afford his medications including insulin at this time. He reports understanding on how to draw up insulin and administer. Reviewed injections sites and cleaning injection site and rotating sites.     Patient  reports he drinks water and soda. Discussed about sugary drinks affecting his blood sugars. Reviewed healthier alternatives. Patient admits to not having the healthiest diet. Reviewed healthy plate method and watching portions sizes especially of carbohydrates. Encouraged attend diabetic education.     Patient reports he has a working glucometer and supplies at home. Admits to not checking blood sugars since being home from the hospital. Reviewed importance of checking twice a day and keeping a log. Instructed to call with any blood sugars less than 70 or greater than 300.     Last A1C was 8.6% on 10/16/24.     Daughter in law reports he has 1 step to enter the home. There is a 2nd level but patient has been living on the first level and has a commode. Declines need for additional DME equipment at this time and has the followin DME: cane, walker, scooter, commode, blood pressure machine and glucometer. Not interested in CGM a this time. Patient is managing with ADL's and with the help of his fiportia Salazar. Her contact number is [phone].       To follow up in 2 weeks and scheduled for 12/5/24. Patient given blood pressure and blood sugar logs and to bring back to next appointment.     Patient does not have any further questions, concerns, or other needs at this time. Patient has my contact number [phone] and PCP office number [phone] if needed. Patient is agreeable for further outreach.       Start of care assessment and diabetes assessment completed and careplan started.     Please see physician note for additional information.

## 2024-11-21 NOTE — TELEPHONE ENCOUNTER
Currently no sooner appts available. Patient on high priority cancellation list for Dr. oN and JOSEPH.

## 2024-11-22 ENCOUNTER — HOME CARE VISIT (OUTPATIENT)
Dept: HOME HEALTH SERVICES | Facility: HOME HEALTHCARE | Age: 59
End: 2024-11-22
Payer: MEDICARE

## 2024-11-22 VITALS
DIASTOLIC BLOOD PRESSURE: 100 MMHG | OXYGEN SATURATION: 94 % | TEMPERATURE: 97.9 F | SYSTOLIC BLOOD PRESSURE: 152 MMHG | HEART RATE: 64 BPM

## 2024-11-22 PROCEDURE — G0153 HHCP-SVS OF S/L PATH,EA 15MN: HCPCS

## 2024-11-22 NOTE — SPEECH THERAPY NOTE
SLP Discharge Summary    Pt was discharged home w/ family support/supervision on 11/18/2024. Throughout pt's stay on the acute rehab center, primary focus of ST services targeted speech and cognitive skills to where pt was making slower progress by time of discharge. Pt's overall cognitive skills were noted to be min A for comprehension, supervision-min A for expression, supervision for social interactions, mod A for executive functions and memory. Family education/training was provided to pt's s/o in regard to the need for assistance in completing I ADL tasks at discharge which s/o was in agreement.    Summarization of pt's stay as follows:     In regard to cognitive skills, pt was able to complete formalized cognitive assessment, CLQT+ on initial evaluation with a Composite Severity Rating score of 1.2 out of 4.0, correlating to overall severely impaired cognitive linguistic impairments at time of evaluation and in comparison to age matched peers ranging from 18-68 y/o. Cognitive barriers which present include: decreased attention, ST memory recall , problem solving, reasoning, sequencing, organization of thoughts, judgement, slower processing, insight, and as well as fatigue, which still impacts pt's overall safety, functional cognitive communication skills as well as functional mobility. The following interventions are used to target these barriers, including verbal problem solving task, verbal working memory tasks, visual memory recall tasks, drawing conclusions activities, written sequencing tasks, verbal sequencing tasks, categorization tasks , picture problem solving activities, verbal reasoning tasks, verbal review of current medications,  written health management tasks, verbal health management tasks, functional reading tasks , and family education/training. Continued cognitive barriers which present include: decreased attention, visual attention, ST memory recall , problem solving, reasoning, sequencing,  organization of thoughts, judgement, slower processing, and insight, which still impacts pt's overall safety, functional cognitive communication skills as well as functional mobility. The following interventions are used to target these barriers, including verbal problem solving task, drawing conclusions activities, verbal sequencing tasks, categorization tasks , picture problem solving activities, verbal reasoning tasks, verbal review of current medications, written review of medications, visual retraining activities.,  written health management tasks, verbal health management tasks, visual attention tasks, functional reading tasks , and family education/training.     Additionally, pt completed Motor Speech Evaluation, in which pt is demonstrating speech intelligibility to be moderate-severely impaired at the sentence and conversational level. Pt elicited the following errors imprecise articulation, distorted speech sounds, decreased breath support for speech and fast rate of speech. Pt noted to have decrease production and sentence and conversation level over time in evaluation with pt getting more fatigued. Pt reports insight with decrease intelligibility. Speech barriers which present include: decreased intelligibility decreased at word, sentence and conversational level, imprecise articulation , and faster rate of speech which still impacts pt's overall safety, functional cognitive communication skills as well as functional mobility.  The following interventions are used to target these barriers, including review of speech strategies for carryover in conversation, OME review and speech drills. Continued speech barriers which present include: imprecise articulation, distorted speech sounds, decreased breath support for speech and fast rate of speech, which impact pt's overall functional communication skills as his verbal expression continues to be limited by at least moderate dysarthria, noted at the phrase and  sentence level with more severe deficits in speech intelligibility at the conversational level. The following interventions are used to target these barriers, including including review of speech strategies for carryover in conversation, OME review and speech drills.     Focus for continued services to target review of OME's, speech strategies, use of speech drills to maximize speech intelligibility. Also will initiate targeting cognitive skills targeting ST recall, problem solving, organization of thoughts, sequencing, as well as maximizing more insight to stroke education and prevention. Family training/education had been initiated with pt's spouse, but will need to touch base given recommendations for spouse to complete I ADL tasks for improvement in compliance in medication management, appointment scheduling, finances, etc. Pt has been exhibiting difficulty in visual perceptual tasks which will also impact his ability to complete reading comprehension given information on pill bottles, bills, etc. While overall speech intelligibility has improved, overall consistent carryover of speech strategies is decreased. Recommendations at time of discharge are for continued home ST services to maximize overall functional independence given overall cognitive linguistic skills and speech intelligibility in attempts to decreased caregiver burden over time.

## 2024-11-22 NOTE — OCCUPATIONAL THERAPY NOTE
Occupational Therapy Discharge Summary:     Pt is 60yo male admitted to [facility] s/p CVA with right hemiparesis, with spasticity in UE. Pt made significant progress throughout rehab stay, from OT standpoint. Pt was D/C home with family support and home OT/PT/nsg/HHA to continue treatment in the home environment and assist with environmental modifications needed to maximize independence and safety prior to transitioning to outpt neuro program for OT/PT/ST. At time of D/C pt was completing ADLS with supervision to min A and functional transfers with supervision without AD. Did discuss with physician considering dynasplint evaluation however she is concerned with patient's compliance and at this time recommends pt follow up outpatient for botox injections and possible splinting of RUE pending pt's follow through with outpatient therapy and appointments.     Pt recommended for the following DME: transfer tub bench, PF BSC over the toilet, elastic shoelaces provided during [facility] stay.  Family training was completed with Marie and her dtr over two days with OT/PT.

## 2024-11-23 NOTE — PROGRESS NOTES
INTERNAL MEDICINE FOLLOW-UP OFFICE VISIT  [facility]  [address]  [place] [address]    NAME: Tommie Taylor  AGE: 59 y.o. SEX: male    DATE OF ENCOUNTER: 11/23/2024    Assessment and Plan     1. Encounter for support and coordination of transition of care (Primary)  2. Cerebrovascular accident (CVA), unspecified mechanism (HCC)  3. Primary hypertension  4. Chronic kidney disease-mineral and bone disorder (CKD-MBD)  5. Patient cannot afford medications  Patient with history of obesity, hypertension, uncontrolled diabetes, CKD 4, DAISY, hyperlipidemia,  who is presenting for TCM visit after recent admission at [facility] from 10/14-10/26/24 for CVA after presenting with R sided weakness/paresis. Admitted to stroke pathway. Hypertensive emergency, ROSINA on admission. Required Cardene drip. Initial CT head negative. MRI brain 10/16 revealed left corona radiata stroke. Repeat MRI brain 10/24 with grossly stable recent infarct demonstrated involving the left frontal periventricular/corona radiata white matter with associated cytotoxic edema but no associated hemorrhage.  Discharged to  [facility] 10/26-11/18/24.   Discharged home with  VNA on labetalol 300mg bid, nifedipine 60mg qd, and hydralazine 10mg q8, ASA/statin. S/P Plavix therapy.   Compliant with PT/OT. Has not taken his BP meds for several days now due to inability to afford meds.   Labs reviewed; ROSINA resolved; renal function now at baseline   /98 today, SBP elevated on recheck   RUE strength 3/5 elbow flexion, 2/5 elbow extension, 4/5 shoulder flexion/extension; R hand  0/5, mild dysarthria and R facial droop noted on exam. Heart RRR. Euvolemic appearing.   Plan:  S/P DAPT with ASA/Plavix  Continue ASA 81mg daily  Continue statin  BP control with oral antihypertensives as below;  DM control with insulin as below  Continue home PT/OT  Ambulatory Referral to Complex Care Management Program  Ambulatory  Referral to Financial Counseling Program; Future  Follow up in two weeks for BP recheck as below  Follow up with neurology as planned   Strict return to ED precautions provided    6. Primary hypertension  In setting of recent L corona radiata stroke   Was discharged home on labetalol 300mg bid, nifedipine 60mg qd, and hydralazine 10mg q8  Patient has not been taking oral BP meds for several days due to inability to afford meds  /98 today, SBP elevated on recheck  Known R sided deficits on exam as above  Blood Pressure after the second check (SBP/DBP)165/90  New diagnosis of hypertensionNo  Patient is already on antihypertensive medicationsYes  Patient is complaint with the antihypertensive medicationsNo (provide explanation): Cannot afford medications  Adjusting doses of antihypertensive medicationsYes  Adding new antihypertensive medicationsNo  Number of antihypertensive medications before the visit3  Number of antihypertensive medications after the visit3  Plan: Will provide short course of atenolol/hydralazine/nifedipine provided under [employer] $4 prescription plan until patient's insurance is renewed.  Start atenolol 25mg qd (labetalol not in [employer] prescription plan)  Start hydralazine 10mg q8h  Start ifedipine 60mg qd  BP log and instructions for adequate BP check provided; instructed to have it returned at follow up  Ambulatory Referral to Financial Counseling Program; Future  Follow up in two weeks for BP recheck and further management       7. Type 2 diabetes mellitus with stage 4 chronic kidney disease and hypertension (HCC)  A1c 8.6  Noncompliant with Metformin in past, lifestyle/dietary measures   Was discharged home after recent admission for L sided stroke on insulin lispro however has been unable to afford insulin  Has glucometer but does not check BS regularly;   Plan: will give conservative weight based Rx for Novolin as part of [employer] medication assistance plan for now until patient's  medical insurance is renewed in 1/2024  BS log provided, instructed to have it returned at follow up. DM education referral provided.   - insulin NPH-insulin regular (NovoLIN 70/30 ReliOn) 100 units/mL subcutaneous injection; Take 20U in the AM, 10U at bedtime  Dispense: 3 mL; Refill: 1  - Ambulatory Referral to Diabetic Education; Future  - Ambulatory Referral to Financial Counseling Program; Future      Orders Placed This Encounter   Procedures    influenza vaccine, recombinant, PF, 0.5 mL IM (Flublok)    COVID-19 Pfizer mRNA vaccine 12 yr and older (Comirnaty pre-filled syringe)    Ambulatory Referral to Financial Counseling Program    Ambulatory Referral to Complex Care Management Program    Ambulatory Referral to Diabetic Education       - Counseling Documentation: patient was counseled regarding: diagnostic results, instructions for management, risk factor reductions, prognosis, patient and family education, impressions, risks and benefits of treatment options, and importance of compliance with treatment    BMI Counseling: Body mass index is 45.16 kg/m². The BMI is above normal. Nutrition recommendations include reducing portion sizes, decreasing overall calorie intake, 3-5 servings of fruits/vegetables daily, reducing fast food intake, consuming healthier snacks, decreasing soda and/or juice intake, moderation in carbohydrate intake, increasing intake of lean protein, reducing intake of saturated fat and trans fat, and reducing intake of cholesterol. Exercise recommendations include moderate aerobic physical activity for 150 minutes/week.     Chief Complaint     Chief Complaint   Patient presents with    Transition of Care Management       History of Present Illness     Patient with history of obesity, hypertension, uncontrolled diabetes, CKD 4, DAISY, hyperlipidemia,  who is presenting for TCM visit after recent admission at [facility] from 10/14-10/26/24 for CVA after presenting with R sided weakness/paresis.  Admitted to stroke pathway. Hypertensive emergency, ROSINA on admission. Required Cardene drip. Initial CT head negative. MRI brain 10/16 revealed left corona radiata stroke. Repeat MRI brain 10/24 with grossly stable recent infarct demonstrated involving the left frontal periventricular/corona radiata white matter with associated cytotoxic edema but no associated hemorrhage.  Discharged to  [facility] 10/26-11/18/24.   Discharged home with  VNA on labetalol 300mg bid, nifedipine 60mg qd, and hydralazine 10mg q8, ASA/statin. S/P Plavix therapy.   Compliant with PT/OT. Has not taken his BP meds for several days now due to inability to afford meds.       The following portions of the patient's history were reviewed and updated as appropriate: allergies, current medications, past family history, past medical history, past social history, past surgical history and problem list.    Review of Systems     Review of Systems  All ROS negative unless otherwise stated in the HPI    Active Problem List     Patient Active Problem List   Diagnosis    Bipolar I disorder, severe, current or most recent episode depressed, with psychotic features ([facility])    Panic attacks    Flat foot    Diabetic polyneuropathy ([facility])    Allergic rhinitis    GERD (gastroesophageal reflux disease)    Insomnia    Hiatal hernia    Lower esophageal ring ([place])    Generalized anxiety disorder    DAISY (obstructive sleep apnea)    Elevated serum creatinine    Stage 4 chronic kidney disease ([facility])    Persistent proteinuria    Anemia, unspecified    Toenail deformity    Hyperlipidemia    Vitamin D deficiency    Class 3 severe obesity due to excess calories with serious comorbidity and body mass index (BMI) of 40.0 to 44.9 in adult ([facility])    Lightheaded    Loss of appetite    Unintended weight loss    Tardive dyskinesia    Hypertension    Memory deficit    Tremor    B12 deficiency    Vision decreased    DM2 (diabetes mellitus, type 2) ([facility])    Type 2 diabetes mellitus  with stage 4 chronic kidney disease and hypertension (HCC)    CVA (cerebral vascular accident) (HCC)    Acute kidney injury superimposed on stage 4 chronic kidney disease (HCC)    Encephalopathy    Volume overload    Acid-base disorder, mixed    Stroke-like symptoms    Impaired mobility and activities of daily living    At risk for alteration in bowel function    Overgrown toenails    Fall during current hospitalization    Orthostatic hypotension    Chronic kidney disease-mineral and bone disorder (CKD-MBD)    Chronic kidney disease    Spasticity    Hypomagnesemia       Objective     /98 (BP Location: Left arm, Patient Position: Sitting, Cuff Size: Large) Comment: BP med not taken  Pulse 84   Temp 98 °F (36.7 °C) (Temporal)   Wt 127 kg (279 lb 12.8 oz)   BMI 45.16 kg/m²     Physical Exam  Constitutional:       General: He is not in acute distress.     Appearance: Normal appearance. He is not ill-appearing or toxic-appearing.   HENT:      Nose: Nose normal.      Mouth/Throat:      Mouth: Mucous membranes are moist.   Eyes:      Conjunctiva/sclera: Conjunctivae normal.   Cardiovascular:      Rate and Rhythm: Normal rate and regular rhythm.      Pulses: Normal pulses.      Heart sounds: Normal heart sounds. No murmur heard.     No friction rub. No gallop.   Pulmonary:      Effort: Pulmonary effort is normal.      Breath sounds: Normal breath sounds. No wheezing or rhonchi.   Abdominal:      General: There is no distension.      Palpations: Abdomen is soft.      Tenderness: There is no abdominal tenderness.   Musculoskeletal:      Right lower leg: No edema.      Left lower leg: No edema.   Skin:     Capillary Refill: Capillary refill takes less than 2 seconds.      Findings: No rash.   Neurological:      Mental Status: He is alert and oriented to person, place, and time. Mental status is at baseline.      Comments: RUE strength 3/5 elbow flexion, 2/5 elbow extension, 4/5 shoulder flexion/extension; R hand   0/5, mild dysarthria and R facial droop noted on exam.       Pertinent Laboratory/Diagnostic Studies:  CBC:   Lab Results   Component Value Date/Time    WBC 6.27 11/18/2024 05:22 AM    WBC 6.44 12/15/2015 09:12 PM    RBC 3.76 (L) 11/18/2024 05:22 AM    RBC 4.93 12/15/2015 09:12 PM    HGB 10.2 (L) 11/18/2024 05:22 AM    HGB 12.1 12/15/2015 09:12 PM    HCT 30.6 (L) 11/18/2024 05:22 AM    HCT 35.6 (L) 12/15/2015 09:12 PM    MCV 81 (L) 11/18/2024 05:22 AM    MCV 72 (L) 12/15/2015 09:12 PM    MCH 27.1 11/18/2024 05:22 AM    MCH 24.5 (L) 12/15/2015 09:12 PM    MCHC 33.3 11/18/2024 05:22 AM    MCHC 34.0 12/15/2015 09:12 PM    RDW 14.1 11/18/2024 05:22 AM    RDW 14.4 12/15/2015 09:12 PM    MPV 9.7 11/18/2024 05:22 AM    MPV 9.9 12/15/2015 09:12 PM     11/18/2024 05:22 AM     12/15/2015 09:12 PM    NRBC 0 11/18/2024 05:22 AM    NEUTOPHILPCT 51 11/18/2024 05:22 AM    NEUTOPHILPCT 64 12/15/2015 09:12 PM    LYMPHOPCT 31 11/18/2024 05:22 AM    LYMPHOPCT 23 12/15/2015 09:12 PM    MONOPCT 12 11/18/2024 05:22 AM    MONOPCT 9 12/15/2015 09:12 PM    EOSPCT 4 11/18/2024 05:22 AM    EOSPCT 3 12/15/2015 09:12 PM    BASOPCT 1 11/18/2024 05:22 AM    BASOPCT 1 12/15/2015 09:12 PM    NEUTROABS 3.17 11/18/2024 05:22 AM    NEUTROABS 4.12 12/15/2015 09:12 PM    LYMPHSABS 1.95 11/18/2024 05:22 AM    LYMPHSABS 1.48 12/15/2015 09:12 PM    MONOSABS 0.75 11/18/2024 05:22 AM    MONOSABS 0.58 12/15/2015 09:12 PM    EOSABS 0.27 11/18/2024 05:22 AM    EOSABS 0.19 12/15/2015 09:12 PM     Chemistry Profile:   Lab Results   Component Value Date/Time     12/15/2015 09:12 PM    K 4.7 11/21/2024 09:43 AM    K Unable to analyze due to hemolysis 12/15/2015 09:12 PM     (H) 11/21/2024 09:43 AM     12/15/2015 09:12 PM    CO2 22 11/21/2024 09:43 AM    CO2 26.5 12/15/2015 09:12 PM    ANIONGAP 7 12/15/2015 09:12 PM    BUN 36 (H) 11/21/2024 09:43 AM    BUN 6 12/15/2015 09:12 PM    CREATININE 3.49 (H) 11/21/2024 09:43 AM    CREATININE  "1.16 12/15/2015 09:12 PM    GLUC 160 (H) 11/21/2024 09:43 AM    GLUF 80 11/15/2024 05:22 AM    GLUCOSE 315 (H) 12/15/2015 09:12 PM    CALCIUM 8.3 (L) 11/21/2024 09:43 AM    CALCIUM 7.8 (L) 12/15/2015 09:12 PM    CORRECTEDCA 9.5 10/17/2024 04:59 AM    MG 1.7 (L) 11/18/2024 05:22 AM    PHOS 4.3 11/18/2024 05:22 AM    AST 21 10/17/2024 04:59 AM    AST Unable to analyze due to hemolysis 12/15/2015 09:12 PM    ALT 9 10/17/2024 04:59 AM    ALT 14 12/15/2015 09:12 PM    ALKPHOS 69 10/17/2024 04:59 AM    ALKPHOS 103 12/15/2015 09:12 PM    PROT 6.5 12/15/2015 09:12 PM    BILITOT 0.28 12/15/2015 09:12 PM    EGFR 18 11/21/2024 09:43 AM     Health Maintenance: No results for input(s): \"PSA\", \"HEPCAB\" in the last 8784 hours.    Current Medications     Current Outpatient Medications:     atenolol (TENORMIN) 25 mg tablet, Take 1 tablet (25 mg total) by mouth daily, Disp: 30 tablet, Rfl: 0    atorvastatin (LIPITOR) 20 mg tablet, Take 1 tablet (20 mg total) by mouth daily, Disp: 30 tablet, Rfl: 0    hydrALAZINE (APRESOLINE) 10 mg tablet, Take 1 tablet (10 mg total) by mouth every 8 (eight) hours, Disp: 90 tablet, Rfl: 0    insulin NPH-insulin regular (NovoLIN 70/30 ReliOn) 100 units/mL subcutaneous injection, Take 20U in the AM, 10U at bedtime, Disp: 3 mL, Rfl: 1    NIFEdipine (PROCARDIA XL) 30 mg 24 hr tablet, Take 2 tablets (60 mg total) by mouth daily, Disp: 60 tablet, Rfl: 0    acetaminophen (TYLENOL) 325 mg tablet, Take 2 tablets (650 mg total) by mouth every 6 (six) hours as needed for mild pain, fever or headaches, Disp: , Rfl:     ammonium lactate (LAC-HYDRIN) 12 % cream, Apply topically as needed for dry skin (Patient taking differently: Apply 1 Application topically as needed for dry skin. caden salazar), Disp: 385 g, Rfl: 0    aspirin (ECOTRIN LOW STRENGTH) 81 mg EC tablet, Take 1 tablet (81 mg total) by mouth daily, Disp: , Rfl:     Blood Glucose Monitoring Suppl (FREESTYLE LITE) ANTONIA, by Does not apply route 3 (three) times a " "day, Disp: 1 each, Rfl: 0    Blood Glucose Monitoring Suppl (FreeStyle Lite) w/Device KIT, USE AS INSTRUCTED 4 TIMES A DAY, Disp: 1 kit, Rfl: 0    buPROPion (WELLBUTRIN) 100 mg tablet, Take 1 tablet (100 mg total) by mouth 2 (two) times a day Take bid--(once in the AM and once at 12 noon), Disp: 60 tablet, Rfl: 0    calcium carbonate (TUMS) 500 mg chewable tablet, Chew 2 tablets (1,000 mg total) 2 (two) times a day as needed for indigestion or heartburn, Disp: , Rfl:     Continuous Blood Gluc  (FreeStyle Clarence 14 Day [place]) ANTONIA, Use  to check BG at least 4 times a day, Disp: 1 each, Rfl: 0    Continuous Blood Gluc Sensor (FreeStyle Clarence 14 Day Sensor) MISC, Apply sensor every 14 days to check BG at least 4 times a day (Patient not taking: Reported on 4/24/2024), Disp: 2 each, Rfl: 5    ergocalciferol (VITAMIN D2) 50,000 units, Take 1 capsule (50,000 Units total) by mouth 3 (three) times a week, Disp: 12 capsule, Rfl: 0    FREESTYLE LITE test strip, CHECK BLOOD SUGARS FOUR TIMES DAILY, Disp: 400 strip, Rfl: 3    glucose monitoring kit (FREESTYLE) monitoring kit, Use 1 each 4 (four) times a day Fine to substitute other brand if not covered by insurance (Patient not taking: Reported on 7/19/2024), Disp: 1 each, Rfl: 0    insulin lispro (HumALOG/ADMELOG) 100 units/mL injection, Inject 2-12 Units under the skin 3 (three) times a day before meals, Disp: , Rfl:     Insulin Pen Needle 31G X 8 MM MISC, Check sugars three times a day, Disp: 100 each, Rfl: 1    INSULIN SYRINGE .5CC/29G 29G X 1/2\" 0.5 ML MISC, Use (Patient not taking: Reported on 7/19/2024), Disp: , Rfl:     Lancets (FREESTYLE) lancets, USE THREE TIMES DAILY AS DIRECTED, Disp: 300 each, Rfl: 0    magnesium gluconate (MAGONATE) 500 MG tablet, Take 1 tablet (500 mg total) by mouth daily Do not start before November 16, 2024., Disp: 30 tablet, Rfl: 0    OLANZapine (ZyPREXA) 10 mg tablet, Take 1 tablet (10 mg total) by mouth daily at bedtime, " Disp: 30 tablet, Rfl: 0    pantoprazole (PROTONIX) 40 mg tablet, Take 1 tablet (40 mg total) by mouth daily, Disp: 90 tablet, Rfl: 3    traZODone (DESYREL) 50 mg tablet, Take 1 tablet (50 mg total) by mouth daily at bedtime as needed for sleep, Disp: 30 tablet, Rfl: 0    Valbenazine Tosylate (Ingrezza) 40 MG CAPS, Take 40 mg by mouth daily at bedtime, Disp: 90 capsule, Rfl: 0    Health Maintenance     Health Maintenance   Topic Date Due    DM Eye Exam  08/26/2022    Influenza Vaccine (1) 09/01/2024    COVID-19 Vaccine (3 - 2024-25 season) 09/01/2024    Medicare Annual Wellness Visit (AWV)  11/27/2024    Colorectal Cancer Screening  01/12/2025    HEMOGLOBIN A1C  01/16/2025    Zoster Vaccine (1 of 2) 06/26/2025 (Originally 6/20/2016)    Pneumococcal Vaccine: Pediatrics (0 to 5 Years) and At-Risk Patients (6 to 64 Years) (2 of 2 - PCV) 06/26/2025 (Originally 12/4/2019)    Hepatitis A Vaccine (1 of 2 - Risk 2-dose series) 06/26/2025 (Originally [birth year]20/1984)    Diabetic Foot Exam  04/08/2025    Kidney Health Evaluation: Albumin/Creatinine Ratio  05/29/2025    BMI: Followup Plan  09/13/2025    BMI: Adult  11/21/2025    Kidney Health Evaluation: GFR  11/21/2025    RSV Vaccine Age 60+ Years (1 - 1-dose 75+ series) 06/20/2041    HIV Screening  Completed    Hepatitis C Screening  Completed    RSV Vaccine age 0-20 Months  Aged Out    HIB Vaccine  Aged Out    IPV Vaccine  Aged Out    Meningococcal ACWY Vaccine  Aged Out    HPV Vaccine  Aged Out     Immunization History   Administered Date(s) Administered    COVID-19 PFIZER VACCINE 0.3 ML IM 03/30/2021, 04/24/2021    INFLUENZA 12/11/2014, 10/09/2015, 12/21/2016, 09/28/2017    Influenza Quadrivalent, 6-35 Months IM 12/21/2016    Influenza, injectable, quadrivalent, preservative free 0.5 mL 11/06/2023    Influenza, recombinant, quadrivalent,injectable, preservative free 12/04/2018, 11/01/2019, 10/23/2020, 01/24/2022, 11/07/2022    Influenza, seasonal, injectable 12/11/2014,  10/09/2015, 09/28/2017    Pneumococcal Polysaccharide PPV23 12/04/2018         ----------------------------------------------------  Judy Westfall DO, PGY-3  Internal Medicine Residency   [facility] - [place], [place]

## 2024-11-24 NOTE — CASE COMMUNICATION
ANA LILIA SERRA Michelle made 11.22.24         Note. Phone call to AISHA Nurse on call to discuss Pts blood pressure.  Observed it to be 152.100.     This is similar to bp taken by LYNDSEYA Nurse on  11.19.24--146.68   and 11.21.24-- 148.98    Pt. reportedly did not take his bp medication since he was home from the hospital.  He reportedly picked up his bp medication today and will start taking it tonight.      Pt also reported that he did not eat anything  today except doritos and that he was going to eat   a meal for supper.   Discussed eating breakfast and lunch due to dx. of  Type 2 diabetes mellitus with diabetic chronic kidney disease     He reportedly felt fine.  Discussed that if he felt ill he should call  and or go to ER.    LYNDSEYA Nurse reportedly was in agreement with this recommendation.     Phone call made to  regarding the above.

## 2024-11-25 ENCOUNTER — HOME CARE VISIT (OUTPATIENT)
Dept: HOME HEALTH SERVICES | Facility: HOME HEALTHCARE | Age: 59
End: 2024-11-25
Payer: MEDICARE

## 2024-11-25 PROCEDURE — G0152 HHCP-SERV OF OT,EA 15 MIN: HCPCS

## 2024-11-25 NOTE — CASE COMMUNICATION
This message is informative only.   No action required.    HH ST eval made 11.22.24    Impression.   Moderate Dysarthria characterized by limited articulatory postures and periiods of unintelligible speech.  Observed intelligible conversational speech to be  4.4 word MLU (mean lenght of utterance).  Oriented x3. Note. Pt. denied any memory deficits but Sp tx. hospital reports reported that Pt has cognitive linguistic deficits. Pt denied  any difficulty swallowing and is tolerating a regular diet w thin liquids.  Hearing.  WFL   Vision. He reportedly has difficulty with vision and reading glasses reportedly do not help    Rec.   Spt x 1x1, 2x3  Cont informal eval particularly in cognitive linguistic area  Practice assignments given

## 2024-11-26 ENCOUNTER — HOME CARE VISIT (OUTPATIENT)
Dept: HOME HEALTH SERVICES | Facility: HOME HEALTHCARE | Age: 59
End: 2024-11-26
Payer: MEDICARE

## 2024-11-26 ENCOUNTER — PATIENT OUTREACH (OUTPATIENT)
Dept: INTERNAL MEDICINE CLINIC | Facility: CLINIC | Age: 59
End: 2024-11-26

## 2024-11-26 VITALS
TEMPERATURE: 98 F | OXYGEN SATURATION: 99 % | SYSTOLIC BLOOD PRESSURE: 158 MMHG | DIASTOLIC BLOOD PRESSURE: 100 MMHG | RESPIRATION RATE: 20 BRPM | HEART RATE: 84 BPM

## 2024-11-26 VITALS — HEART RATE: 66 BPM | OXYGEN SATURATION: 99 % | DIASTOLIC BLOOD PRESSURE: 98 MMHG | SYSTOLIC BLOOD PRESSURE: 152 MMHG

## 2024-11-26 VITALS — DIASTOLIC BLOOD PRESSURE: 95 MMHG | SYSTOLIC BLOOD PRESSURE: 151 MMHG | OXYGEN SATURATION: 99 % | HEART RATE: 72 BPM

## 2024-11-26 PROCEDURE — G0153 HHCP-SVS OF S/L PATH,EA 15MN: HCPCS

## 2024-11-26 PROCEDURE — G0299 HHS/HOSPICE OF RN EA 15 MIN: HCPCS

## 2024-11-26 PROCEDURE — G0151 HHCP-SERV OF PT,EA 15 MIN: HCPCS

## 2024-11-26 NOTE — PROGRESS NOTES
Outpatient Care Management Note:    Re:  follow up call    I called patient and his significant other, Marie picked up the phone. Patient is background waiting for [facility] nurse to arrive. Patient has glucometer and working on setting it up. He has not started to check blood sugar yet. Suggested if he has issues or questions setting it up to let [facility] nurse know while they are there so they can further assist.     Patient has not picked up 70/30 insulin from [facility] yet and is not currently taking insulin but Marie reports patient has Lantus and Humalog there for patient to take and reports would have enough to get him through to the new year when he gets his Part D plan back. She is requesting if he can continue with basal bolus insulin regimen. Will have provider further advise and reach out to patient to further discuss.       No further questions, concerns at this time from patient or Marie.     Patient scheduled with Nephrology 1/6/25  Patient scheduled with Neurology 3/18/25

## 2024-11-26 NOTE — PHYSICAL THERAPY NOTE
"[facility] PT DC SUMMARY    58 y/o male pt who presented to [facility] on 10/14/[facility]24 via EMS as prehospital stroke alert d/t fall following episode of dizziness and sudden onset of right sided weakness. He was administered TNK after BP was controlled. Following TNK, patient developed worsening dysarthria and headache. Repeat CTH was unremarkable. MRI of brain showed acute left corona radiata infarct. ECHO showed EF 70%, moderate concentric hypertrophy, grade 2 diastolic dysfunction, mild AVR. At baseline pt was I without AD lives with disabled spouse uses power chair and HD, 2SH with 1+1 ASHOK front door. Can have first floor setup if needed.     Slow progress t/o acute rehab stay due to dec cognition and insight to deficits, + variable R foot clearance pending attention and ftg level. Pt progressed to amb without AD, close supervision for safety, ongoing and evident posterior LOB with dec self recovery. Unable to get self up from floor, advised to call 911 if fall occurs at home. Family participated in FT, step daughter to mainly be support for pt, self purchased gait belt. Sig other did not get off power chair during training.     Overall pt grossly met S goals for mobility, however due to dec insight and impulsivity, remains high fall risk. Home PT recommended to follow to assess pt in home environment and optimize post stroke outcomes pending pt \"buy-in\" and engagement in recovery.     "

## 2024-11-27 ENCOUNTER — HOME CARE VISIT (OUTPATIENT)
Dept: HOME HEALTH SERVICES | Facility: HOME HEALTHCARE | Age: 59
End: 2024-11-27
Payer: MEDICARE

## 2024-11-27 ENCOUNTER — TELEPHONE (OUTPATIENT)
Dept: INTERNAL MEDICINE CLINIC | Facility: CLINIC | Age: 59
End: 2024-11-27

## 2024-11-27 VITALS
OXYGEN SATURATION: 98 % | TEMPERATURE: 97.5 F | SYSTOLIC BLOOD PRESSURE: 147 MMHG | HEART RATE: 65 BPM | DIASTOLIC BLOOD PRESSURE: 89 MMHG

## 2024-11-27 PROCEDURE — G0153 HHCP-SVS OF S/L PATH,EA 15MN: HCPCS

## 2024-11-27 NOTE — TELEPHONE ENCOUNTER
Was informed by patient's care manager, Lakisha Rodriguez RN, who spoke to patient's wife earlier and was informed that the patient has not picked up his 70/30 insulin from [facility] as previously prescribed during last office visit due to affordability/cost. Patient reported he is medical insurance will begin to cover prescription  cost starting 1/2024. I spoke to patient's wife, Marie, over the phone who confirmed that patient has Lantus and Humalog at home with enough supply to get him through to the new year until Part D plan is renewed.  She also reports patient has a glucometer at home.  Per chart review, patient was previously on Lantus 50 units nightly with sliding scale Humalog with meals, dosing confirmed. Per Marie, patient's appetite is improving and is tolerating diet well but not yet at baseline.  Patient instructed to take Lanuts 30U qHS for now and Humolog as previously prescribed. Instructed to continue recording blood sugars log, to be reviewed for further insulin management at upcoming office visit 12/5 with Dr. Mays.  Hypoglycemia education and return precautions provided.  All questions and concerns were addressed appropriately.      ----------------------------------------------------  Judy Westfall DO, PGY-3  Internal Medicine Residency   [facility] - [place], [place]

## 2024-11-28 NOTE — CASE COMMUNICATION
This message is informative.  No action required.     SO reported that she asked PCP if Pt could take her insulin.  They reportedly were waiting for a call back from PCP's office.

## 2024-11-28 NOTE — CASE COMMUNICATION
Pt. reportedly spoke with PCP Dr. Layton Flores this am regarding insulin and he reported that it was ok that Pt. would take FreeStyle Clarence 3.

## 2024-11-29 ENCOUNTER — HOME CARE VISIT (OUTPATIENT)
Dept: HOME HEALTH SERVICES | Facility: HOME HEALTHCARE | Age: 59
End: 2024-11-29
Payer: MEDICARE

## 2024-11-29 VITALS
TEMPERATURE: 97.3 F | RESPIRATION RATE: 18 BRPM | OXYGEN SATURATION: 100 % | HEART RATE: 78 BPM | DIASTOLIC BLOOD PRESSURE: 80 MMHG | SYSTOLIC BLOOD PRESSURE: 132 MMHG

## 2024-11-29 PROCEDURE — G0180 MD CERTIFICATION HHA PATIENT: HCPCS | Performed by: NURSE PRACTITIONER

## 2024-11-29 PROCEDURE — G0300 HHS/HOSPICE OF LPN EA 15 MIN: HCPCS

## 2024-12-02 ENCOUNTER — PATIENT OUTREACH (OUTPATIENT)
Dept: INTERNAL MEDICINE CLINIC | Facility: CLINIC | Age: 59
End: 2024-12-02

## 2024-12-02 ENCOUNTER — TELEPHONE (OUTPATIENT)
Dept: NEUROLOGY | Facility: CLINIC | Age: 59
End: 2024-12-02

## 2024-12-02 ENCOUNTER — HOME CARE VISIT (OUTPATIENT)
Dept: HOME HEALTH SERVICES | Facility: HOME HEALTHCARE | Age: 59
End: 2024-12-02
Payer: MEDICARE

## 2024-12-02 VITALS — SYSTOLIC BLOOD PRESSURE: 165 MMHG | DIASTOLIC BLOOD PRESSURE: 95 MMHG | HEART RATE: 80 BPM | OXYGEN SATURATION: 100 %

## 2024-12-02 DIAGNOSIS — I63.9 CEREBROVASCULAR ACCIDENT (CVA), UNSPECIFIED MECHANISM (HCC): Primary | ICD-10-CM

## 2024-12-02 PROCEDURE — G0152 HHCP-SERV OF OT,EA 15 MIN: HCPCS

## 2024-12-02 NOTE — PROGRESS NOTES
Outpatient Care Management Note:    Re:  follow up - insulin    Received inbasket message from Dr. Westfall on 11/27/24 that he reached out to patient's wife and confirmed home insulin regiment of Lantus 50 units at bedtime and Humalog sliding scale with meals. Patient to continue this regimen for now and follow up with PCP office on 12/5/24 with Dr. Mays. Patient is not taking 70/30 insulin at this time.

## 2024-12-02 NOTE — TELEPHONE ENCOUNTER
Good evening,    I looked at the MRI and although I have some concern for small vessel ischemic disease as a cause for his strokes there were small strokes on both sides suggestive of a central embolic source.  I agree heart monitoring is indicated, I placed the referral    I am concerned with the follow-up, the notes suggested a maximum of 6 to 8 weeks for follow-up with his index hospitalization in October.  He really cannot wait until March to be seen.  I suggest looking for a sooner slot, 60 minutes, could be CATHY or attending for stroke.

## 2024-12-02 NOTE — TELEPHONE ENCOUNTER
Called patient's primary phone number listed on file to notify the patient of the provider's response. There was no answer. Left a voice message requesting for a return call. Provided the office's phone number.

## 2024-12-02 NOTE — TELEPHONE ENCOUNTER
Post CVA Discharge Follow Up  Hospitalization: 10/14/24-10/26/24, 10/26/24-11/18/24    Called patient to obtain an update. Spoke with Marie, patient's spouse, who reports the patient is doing better since discharge. Denies any new or worsening stroke-like symptoms since discharge.     He continues to have the following symptoms: right sided weakness. She claims the right leg weakness has improved since discharge and the right hand weakness has remained the same since discharge.     Patient mobilizes with a cane and requires some assistance with ADLs.    Reviewed appointments - patient successfully followed up with PCP. Patient scheduled for the following: neurology on 3/18/25.     Per notes, patient was recommended for a heart monitor. Confirmed with Marie how the patient is not already established with a cardiologist and confirmed how they did not receive a heart monitor in the mail. Will send message to the neurologist to see if a heart monitor is still indicated.     Patient is receiving home health services.    Confirmed patient is taking aspirin as prescribed. He is taking as prescribed with no medication side effects or signs of bleeding.     During this call, we reviewed stroke education.    As for risk factors, she reports the home health services are monitoring his BP at home. BP on 11/29/24 was 132/80.   She reports how the patient does not regularly check his BS at home. Encouraged regular BS checks and to ensure appropriate diabetes mellitus management.  He is a non smoker.  Encouraged patient to follow a well balanced, diabetic diet.    All of her questions were addressed. At the conclusion of the conversation, she denies having any further questions or concerns.

## 2024-12-03 ENCOUNTER — HOME CARE VISIT (OUTPATIENT)
Dept: HOME HEALTH SERVICES | Facility: HOME HEALTHCARE | Age: 59
End: 2024-12-03
Payer: MEDICARE

## 2024-12-03 VITALS — DIASTOLIC BLOOD PRESSURE: 100 MMHG | HEART RATE: 72 BPM | SYSTOLIC BLOOD PRESSURE: 170 MMHG | OXYGEN SATURATION: 100 %

## 2024-12-03 PROCEDURE — G0151 HHCP-SERV OF PT,EA 15 MIN: HCPCS

## 2024-12-04 ENCOUNTER — HOME CARE VISIT (OUTPATIENT)
Dept: HOME HEALTH SERVICES | Facility: HOME HEALTHCARE | Age: 59
End: 2024-12-04
Payer: MEDICARE

## 2024-12-04 ENCOUNTER — TELEPHONE (OUTPATIENT)
Dept: NEPHROLOGY | Facility: CLINIC | Age: 59
End: 2024-12-04

## 2024-12-04 ENCOUNTER — TELEPHONE (OUTPATIENT)
Dept: INTERNAL MEDICINE CLINIC | Facility: CLINIC | Age: 59
End: 2024-12-04

## 2024-12-04 VITALS — DIASTOLIC BLOOD PRESSURE: 105 MMHG | OXYGEN SATURATION: 100 % | SYSTOLIC BLOOD PRESSURE: 186 MMHG | HEART RATE: 65 BPM

## 2024-12-04 PROCEDURE — G0152 HHCP-SERV OF OT,EA 15 MIN: HCPCS

## 2024-12-04 PROCEDURE — G0153 HHCP-SVS OF S/L PATH,EA 15MN: HCPCS

## 2024-12-04 NOTE — TELEPHONE ENCOUNTER
Patient's wife called back. She advised that date and time will not work. Kept appointment as is.

## 2024-12-04 NOTE — TELEPHONE ENCOUNTER
Brittany from [facility] called to let doctor know that patient has been having elevated blood pressure. Patient told her he has been taking his medciations.  11/25/24- 173/103 20 mins later 151/95  12/2/24 165/95  12/4//105    Please review. If you need to contact Brittany please call her @ [phone]

## 2024-12-04 NOTE — TELEPHONE ENCOUNTER
Spoke to Brittany with [facility]. She advised that patient denies having any symptoms and that he is taking his medication. She stated that the elevated BP is ongoing and putting him at risk for another stroke. Patient advised her he has an appt tomorrow 12/5/24 here at the clinic. I will forward this information to Dr. Mays who will be seeing him

## 2024-12-05 ENCOUNTER — TELEPHONE (OUTPATIENT)
Dept: HOME HEALTH SERVICES | Facility: HOME HEALTHCARE | Age: 59
End: 2024-12-05

## 2024-12-05 ENCOUNTER — PATIENT OUTREACH (OUTPATIENT)
Dept: INTERNAL MEDICINE CLINIC | Facility: CLINIC | Age: 59
End: 2024-12-05

## 2024-12-05 ENCOUNTER — OFFICE VISIT (OUTPATIENT)
Dept: INTERNAL MEDICINE CLINIC | Facility: CLINIC | Age: 59
End: 2024-12-05

## 2024-12-05 VITALS — TEMPERATURE: 97.8 F | DIASTOLIC BLOOD PRESSURE: 80 MMHG | HEART RATE: 76 BPM | SYSTOLIC BLOOD PRESSURE: 133 MMHG

## 2024-12-05 DIAGNOSIS — Z23 ENCOUNTER FOR IMMUNIZATION: Primary | ICD-10-CM

## 2024-12-05 DIAGNOSIS — Z79.4 TYPE 2 DIABETES MELLITUS WITH HYPERGLYCEMIA, WITH LONG-TERM CURRENT USE OF INSULIN (HCC): ICD-10-CM

## 2024-12-05 DIAGNOSIS — I10 PRIMARY HYPERTENSION: ICD-10-CM

## 2024-12-05 DIAGNOSIS — E11.65 TYPE 2 DIABETES MELLITUS WITH HYPERGLYCEMIA, WITH LONG-TERM CURRENT USE OF INSULIN (HCC): ICD-10-CM

## 2024-12-05 PROCEDURE — G2211 COMPLEX E/M VISIT ADD ON: HCPCS | Performed by: INTERNAL MEDICINE

## 2024-12-05 PROCEDURE — 90471 IMMUNIZATION ADMIN: CPT | Performed by: INTERNAL MEDICINE

## 2024-12-05 PROCEDURE — 90673 RIV3 VACCINE NO PRESERV IM: CPT | Performed by: INTERNAL MEDICINE

## 2024-12-05 PROCEDURE — 99213 OFFICE O/P EST LOW 20 MIN: CPT | Performed by: INTERNAL MEDICINE

## 2024-12-05 NOTE — PROGRESS NOTES
"Name: Tommie Taylor      : 1965      MRN: [id number]  Encounter Provider: Kayode Ferraro DO  Encounter Date: 2024   Encounter department: [facility] [place]  :  Assessment & Plan  Primary hypertension  58 y/o male with pmhx of recent CVA with residual right sided deficits, hypertension, uncontrolled diabetes, CKD 4, DAISY, hyperlipidemia who is presenting for f/u on DM and hypertension. He had recent CVA and was admitted at [facility] from 10/14-10/26/24 and was discharged to [facility] which he completed rehab and was discharged on . He has since been working with home PT. He had recent TCM visit 2 weeks ago and was not taking his blood pressure medications as he did not pick it up due to insurance issues. At last visit he was prescribed atenolol 25mg qd, hydralazine 10mg q8h, and nifedipine 60mg qd which he states he's been compliant with these medications. BP better controlled on this visit at 133/80. Will continue current regimen.         Type 2 diabetes mellitus with hyperglycemia, with long-term current use of insulin ([facility])    Lab Results   Component Value Date    HGBA1C 8.6 (H) 10/16/2024   He states he has not been taking his insulin since being discharged from [facility]. When asked as to why he has not taking his insulin, he replied, \"because I am stupid\". He states having his insulin at home. He lives with his girlfriend niece and his girlfriend's parents. He states his girlfriend and his niece will help him with medication administration. Last A1c 8.6 in 10/2024. He was prescribed 70/30 10 units at bedtime and 20 units in the mornings. Educated on safe injection practices of insulin which he states he knows and will take his insulin when he goes home today. He denied any axiety/depression symptoms especially in recent stroke. Will have him follow up in 1 week with BP and sugar log as well as his niece/girlfriend should accompany him with all his medications. Patient verbalized " "understanding.          Encounter for immunization    Orders:    influenza vaccine, recombinant, PF, 0.5 mL IM (Flublok)           History of Present Illness     60 y/o male with pmhx of recent CVA with residual right sided deficits, hypertension, uncontrolled diabetes, CKD 4, DAISY, hyperlipidemia who is presenting for f/u on DM and hypertension. He had recent CVA and was admitted at [facility] from 10/14-10/26/24 and was discharged to [facility] which he completed rehab and was discharged on 11/18. He has since been working with home PT. He had recent TCM visit 2 weeks ago and was not taking his blood pressure medications as he did not pick it up due to insurance issues. At last visit he was prescribed atenolol 25mg qd, hydralazine 10mg q8h, and nifedipine 60mg qd which he states he's been compliant with these medications. BP better controlled on this visit at 133/80. Will continue current regimen.    In regards to his DM. He states he has not been taking his insulin since being discharged from [facility]. When asked as to why he has not taking his insulin, he replied, \"because I am stupid\". He states having his insulin at home. He lives with his girlfriend niece and his girlfriend's parents. He states his girlfriend and his niece will help him with medication administration. Last A1c 8.6 in 10/2024. He was prescribed 70/30 10 units at bedtime and 20 units in the mornings. Educated on safe injection practices of insulin which he states he knows and will take his insulin when he goes home today. He denied any axiety/depression symptoms especially in recent stroke. Will have him follow up in 1 week with BP and sugar log as well as his niece/girlfriend should accompany him with all his medications. Patient verbalized understanding.         Review of Systems   Constitutional:  Negative for chills and fever.   HENT:  Negative for ear pain and sore throat.    Eyes:  Negative for pain and visual disturbance.   Respiratory:  Negative for cough " and shortness of breath.    Cardiovascular:  Negative for chest pain and palpitations.   Gastrointestinal:  Negative for abdominal pain and vomiting.   Genitourinary:  Negative for dysuria and hematuria.   Musculoskeletal:  Negative for arthralgias and back pain.   Skin:  Negative for color change and rash.   Neurological:  Negative for seizures and syncope.   All other systems reviewed and are negative.         Objective   /80 (BP Location: Left arm, Patient Position: Sitting, Cuff Size: Large)   Pulse 76   Temp 97.8 °F (36.6 °C) (Temporal)      Physical Exam  Vitals and nursing note reviewed.   Constitutional:       General: He is not in acute distress.     Appearance: He is well-developed. He is not ill-appearing.   HENT:      Head: Normocephalic and atraumatic.   Eyes:      Conjunctiva/sclera: Conjunctivae normal.   Cardiovascular:      Rate and Rhythm: Normal rate and regular rhythm.      Heart sounds: No murmur heard.  Pulmonary:      Effort: Pulmonary effort is normal. No respiratory distress.      Breath sounds: Normal breath sounds.   Abdominal:      General: Abdomen is flat. There is no distension.      Palpations: Abdomen is soft.      Tenderness: There is no abdominal tenderness.   Musculoskeletal:         General: No swelling.      Cervical back: Neck supple.   Skin:     General: Skin is warm and dry.      Capillary Refill: Capillary refill takes less than 2 seconds.   Neurological:      Mental Status: He is alert.      Motor: Weakness present.      Comments: RUE 3/5  RLE 5/5  Mild right sided facial droop  Left side with no deficits    Psychiatric:         Mood and Affect: Mood normal.

## 2024-12-06 ENCOUNTER — HOME CARE VISIT (OUTPATIENT)
Dept: HOME HEALTH SERVICES | Facility: HOME HEALTHCARE | Age: 59
End: 2024-12-06
Payer: MEDICARE

## 2024-12-06 ENCOUNTER — TELEPHONE (OUTPATIENT)
Age: 59
End: 2024-12-06

## 2024-12-06 VITALS — HEART RATE: 77 BPM | SYSTOLIC BLOOD PRESSURE: 180 MMHG | DIASTOLIC BLOOD PRESSURE: 100 MMHG | OXYGEN SATURATION: 99 %

## 2024-12-06 VITALS
SYSTOLIC BLOOD PRESSURE: 156 MMHG | TEMPERATURE: 98 F | HEART RATE: 73 BPM | OXYGEN SATURATION: 100 % | DIASTOLIC BLOOD PRESSURE: 96 MMHG | RESPIRATION RATE: 18 BRPM

## 2024-12-06 PROCEDURE — G0153 HHCP-SVS OF S/L PATH,EA 15MN: HCPCS

## 2024-12-06 PROCEDURE — G0299 HHS/HOSPICE OF RN EA 15 MIN: HCPCS

## 2024-12-06 PROCEDURE — G0151 HHCP-SERV OF PT,EA 15 MIN: HCPCS

## 2024-12-06 NOTE — TELEPHONE ENCOUNTER
yadira PT called in. she is putting order in for two times a week for three weeks. [facility] will be sending over. hes making good progress in cognitive skills, and also speech.

## 2024-12-06 NOTE — CASE COMMUNICATION
Patient discharged this visit by nursing. Nursing set up dexcom for patient. Patient reports compliance with medications. Continues with PT OT.

## 2024-12-06 NOTE — PROGRESS NOTES
Outpatient Care Management Note:    Re:  follow up       Planned to follow up with patient at appointment today. Unable to see patient before his visit. Will plan to follow up next week by phone. Please see provider note for additional information.     Patient is scheduled to follow up in 1 week for his diabetes and med adherence check. Patient scheduled for 12/19.

## 2024-12-06 NOTE — TELEPHONE ENCOUNTER
Called patient's primary phone number and spoke with Marie, patient's spouse. Notified her of Dr. Petersen's response. Patient is scheduled to see cardiology on 1/28/25. Moved up HFU from 3/18/25 to 2/4/25 with Harshad Romo PA-C. This was the next available appointment at the [facility]. Provided new appointment details. Patient is on the wait list. She denies any questions or concerns at this time. She was appreciative.

## 2024-12-08 NOTE — ASSESSMENT & PLAN NOTE
"  Lab Results   Component Value Date    HGBA1C 8.6 (H) 10/16/2024   He states he has not been taking his insulin since being discharged from [facility]. When asked as to why he has not taking his insulin, he replied, \"because I am stupid\". He states having his insulin at home. He lives with his girlfriend niece and his girlfriend's parents. He states his girlfriend and his niece will help him with medication administration. Last A1c 8.6 in 10/2024. He was prescribed 70/30 10 units at bedtime and 20 units in the mornings. Educated on safe injection practices of insulin which he states he knows and will take his insulin when he goes home today. He denied any axiety/depression symptoms especially in recent stroke. Will have him follow up in 1 week with BP and sugar log as well as his niece/girlfriend should accompany him with all his medications. Patient verbalized understanding.          "

## 2024-12-08 NOTE — ASSESSMENT & PLAN NOTE
60 y/o male with pmhx of recent CVA with residual right sided deficits, hypertension, uncontrolled diabetes, CKD 4, DAISY, hyperlipidemia who is presenting for f/u on DM and hypertension. He had recent CVA and was admitted at [facility] from 10/14-10/26/24 and was discharged to [facility] which he completed rehab and was discharged on 11/18. He has since been working with home PT. He had recent TCM visit 2 weeks ago and was not taking his blood pressure medications as he did not pick it up due to insurance issues. At last visit he was prescribed atenolol 25mg qd, hydralazine 10mg q8h, and nifedipine 60mg qd which he states he's been compliant with these medications. BP better controlled on this visit at 133/80. Will continue current regimen.

## 2024-12-09 NOTE — CASE COMMUNICATION
This message is informative only.  No action required.      Pt. is making progress with speech intelligibility and cognitive skills but has not reached   maximum potential and would benefit to continue sp tx 2x wk x 3 wks.     Phone call to Darcie at office of [facility] to request orders  to continue sp tx 2x wk x 3 wks.  Note. [facility] will fax orders to 's office

## 2024-12-10 ENCOUNTER — HOME CARE VISIT (OUTPATIENT)
Dept: HOME HEALTH SERVICES | Facility: HOME HEALTHCARE | Age: 59
End: 2024-12-10
Payer: MEDICARE

## 2024-12-10 VITALS — SYSTOLIC BLOOD PRESSURE: 159 MMHG | DIASTOLIC BLOOD PRESSURE: 100 MMHG | HEART RATE: 73 BPM | OXYGEN SATURATION: 99 %

## 2024-12-10 PROCEDURE — G0152 HHCP-SERV OF OT,EA 15 MIN: HCPCS

## 2024-12-11 ENCOUNTER — HOME CARE VISIT (OUTPATIENT)
Dept: HOME HEALTH SERVICES | Facility: HOME HEALTHCARE | Age: 59
End: 2024-12-11
Payer: MEDICARE

## 2024-12-11 ENCOUNTER — OFFICE VISIT (OUTPATIENT)
Dept: NEPHROLOGY | Facility: CLINIC | Age: 59
End: 2024-12-11
Payer: MEDICARE

## 2024-12-11 VITALS — DIASTOLIC BLOOD PRESSURE: 94 MMHG | OXYGEN SATURATION: 99 % | SYSTOLIC BLOOD PRESSURE: 154 MMHG | HEART RATE: 65 BPM

## 2024-12-11 VITALS
HEART RATE: 66 BPM | WEIGHT: 264 LBS | DIASTOLIC BLOOD PRESSURE: 90 MMHG | BODY MASS INDEX: 42.43 KG/M2 | RESPIRATION RATE: 98 BRPM | SYSTOLIC BLOOD PRESSURE: 138 MMHG | HEIGHT: 66 IN

## 2024-12-11 DIAGNOSIS — E66.01 CLASS 3 SEVERE OBESITY DUE TO EXCESS CALORIES WITH SERIOUS COMORBIDITY AND BODY MASS INDEX (BMI) OF 40.0 TO 44.9 IN ADULT (HCC): Primary | ICD-10-CM

## 2024-12-11 DIAGNOSIS — N18.9 CHRONIC KIDNEY DISEASE-MINERAL AND BONE DISORDER (CKD-MBD): ICD-10-CM

## 2024-12-11 DIAGNOSIS — N18.4 TYPE 2 DIABETES MELLITUS WITH STAGE 4 CHRONIC KIDNEY DISEASE AND HYPERTENSION (HCC): ICD-10-CM

## 2024-12-11 DIAGNOSIS — E66.813 CLASS 3 SEVERE OBESITY DUE TO EXCESS CALORIES WITH SERIOUS COMORBIDITY AND BODY MASS INDEX (BMI) OF 40.0 TO 44.9 IN ADULT (HCC): Primary | ICD-10-CM

## 2024-12-11 DIAGNOSIS — E83.9 CHRONIC KIDNEY DISEASE-MINERAL AND BONE DISORDER (CKD-MBD): ICD-10-CM

## 2024-12-11 DIAGNOSIS — M89.9 CHRONIC KIDNEY DISEASE-MINERAL AND BONE DISORDER (CKD-MBD): ICD-10-CM

## 2024-12-11 DIAGNOSIS — I10 PRIMARY HYPERTENSION: ICD-10-CM

## 2024-12-11 DIAGNOSIS — I12.9 TYPE 2 DIABETES MELLITUS WITH STAGE 4 CHRONIC KIDNEY DISEASE AND HYPERTENSION (HCC): ICD-10-CM

## 2024-12-11 DIAGNOSIS — I63.512 CEREBROVASCULAR ACCIDENT (CVA) DUE TO OCCLUSION OF LEFT MIDDLE CEREBRAL ARTERY (HCC): ICD-10-CM

## 2024-12-11 DIAGNOSIS — Z09 HOSPITAL DISCHARGE FOLLOW-UP: ICD-10-CM

## 2024-12-11 DIAGNOSIS — E11.22 TYPE 2 DIABETES MELLITUS WITH STAGE 4 CHRONIC KIDNEY DISEASE AND HYPERTENSION (HCC): ICD-10-CM

## 2024-12-11 PROBLEM — R79.89 ELEVATED SERUM CREATININE: Status: RESOLVED | Noted: 2019-04-17 | Resolved: 2024-12-11

## 2024-12-11 PROBLEM — N17.9 ACUTE KIDNEY INJURY SUPERIMPOSED ON STAGE 4 CHRONIC KIDNEY DISEASE (HCC): Status: RESOLVED | Noted: 2024-10-17 | Resolved: 2024-12-11

## 2024-12-11 PROCEDURE — G0152 HHCP-SERV OF OT,EA 15 MIN: HCPCS

## 2024-12-11 PROCEDURE — G0153 HHCP-SVS OF S/L PATH,EA 15MN: HCPCS

## 2024-12-11 PROCEDURE — 99214 OFFICE O/P EST MOD 30 MIN: CPT | Performed by: INTERNAL MEDICINE

## 2024-12-11 PROCEDURE — G2211 COMPLEX E/M VISIT ADD ON: HCPCS | Performed by: INTERNAL MEDICINE

## 2024-12-11 RX ORDER — NIFEDIPINE 90 MG/1
90 TABLET, FILM COATED, EXTENDED RELEASE ORAL DAILY
Qty: 90 TABLET | Refills: 1 | Status: SHIPPED | OUTPATIENT
Start: 2024-12-11

## 2024-12-11 NOTE — ASSESSMENT & PLAN NOTE
Lab Results   Component Value Date    HGBA1C 8.6 (H) 10/16/2024     Chronic kidney disease stage IV (G4A3)  -- In the setting of poorly controlled hypertension and diabetes with progression of disease with also superimposed acute kidney injury  -- Patient now at a higher baseline creatinine now fluctuating mid to high 3's  -- Patient's preferred modality of dialysis is here in center hemodialysis  -- I have referred him to nephrology advanced care meeting and kidney smart education which she has not attended.  I will place those referrals for him again.  -- No urgent indication for renal placement therapy but given his poorly controlled comorbidities he is headed to dialysis in the near future.  -- Not on RAAS blockade due to progression of underlying disease  -- Avoid NSAIDs  -- Needs better blood pressure control  -- Needs better diabetic control    Diabetes mellitus type 2  -- Yearly ophthalmologic and podiatry checks  -- Continue follow-up with endocrinology  -- Poorly controlled  --Hemoglobin A1c 8.6  -- Nephrotic range proteinuria in the past  -- Now with recent CVA    Hypertension  -- BMI 42.6  -- Blood pressure not at target recent CVA  -- Need to aim for blood pressure less than 130/80  -- Continue atenolol 25 mg daily, hydralazine 10 mg 3 times a day  -- Increase nifedipine to 90 mg daily  -- Home blood pressure monitoring  -- Low 2 g sodium diet    Orders:    Magnesium; Future    CBC w/o differential; Future    Albumin / creatinine urine ratio; Future    PTH, intact; Future    Vitamin D 25 hydroxy; Future    Phosphorus; Future    Comprehensive metabolic panel; Future    Ambulatory Referral to CKD Education Program; Future    Ambulatory Referral to Advanced Care Planning; Future    NIFEdipine (ADALAT CC) 90 mg 24 hr tablet; Take 1 tablet (90 mg total) by mouth daily

## 2024-12-11 NOTE — ASSESSMENT & PLAN NOTE
-- Discharged in mid November after he presented to the hospital in mid October for right sided weakness and right facial droop.  An MRI revealed left corona radiata infarct.  He was administered TNK.  He was also placed on nicardipine for hypertensive emergency and started on antiplatelet agent.  --He was seen by nephrology for acute kidney injury, his peak creatinine was 4.57 mg/dL it was thought to be in the setting of hemodynamic fluctuations uncontrolled hypertension progression of disease autoregulatory failure and acute tumor necrosis  --His renal function improved and has plateaued in the mid to high 3 range establishing a new baseline creatinine  --Post discharge blood work showed a creatinine of 3.49 mg/dL and found to be stable with normal electrolytes.  --He does not know his medications for today    Orders:    Magnesium; Future    CBC w/o differential; Future    Albumin / creatinine urine ratio; Future    PTH, intact; Future    Vitamin D 25 hydroxy; Future    Phosphorus; Future    Comprehensive metabolic panel; Future    Ambulatory Referral to CKD Education Program; Future    Ambulatory Referral to Advanced Care Planning; Future    NIFEdipine (ADALAT CC) 90 mg 24 hr tablet; Take 1 tablet (90 mg total) by mouth daily

## 2024-12-11 NOTE — PATIENT INSTRUCTIONS
1.)  Low 2 g sodium diet    2.)  Monitor weights at home    3.)  Avoid NSAIDs (ibuprofen, Motrin, Advil, Aleve, naproxen)    4.)  Monitor blood pressure at home, call if blood pressure greater than 150/90 persistently    5.) I will plan to discuss all results including blood work, and/or imaging at our next visit, unless there is an urgent indication, in which case I will call you earlier. If you have any questions or concerns about your results, please feel free to call our office.    6.)  Good diabetic and blood pressure control.  Target blood pressure less than 130/80    7.) CKD Education class and attend Nephrology Kidney Care (Advanced Care) at next visit    8.) Increase Nifedipine to 90 mg daily (you currently tale 60 mg which are 2 tabs of 30 mg) I will give you 90 mg tab for next refill, but you can finish off what you have and take 3 tablets to equal the 90mh

## 2024-12-11 NOTE — ASSESSMENT & PLAN NOTE
Lab Results   Component Value Date    EGFR 18 11/21/2024    EGFR 15 11/18/2024    EGFR 15 11/15/2024    CREATININE 3.49 (H) 11/21/2024    CREATININE 4.08 (H) 11/18/2024    CREATININE 3.94 (H) 11/15/2024   -- Phosphorus is normal.  Calcium is normal  -- Check PTH vitamin D 25-hydroxy level  -- Check a repeat magnesium level    Orders:    Magnesium; Future    CBC w/o differential; Future    Albumin / creatinine urine ratio; Future    PTH, intact; Future    Vitamin D 25 hydroxy; Future    Phosphorus; Future    Comprehensive metabolic panel; Future    Ambulatory Referral to CKD Education Program; Future    Ambulatory Referral to Advanced Care Planning; Future    NIFEdipine (ADALAT CC) 90 mg 24 hr tablet; Take 1 tablet (90 mg total) by mouth daily

## 2024-12-11 NOTE — ASSESSMENT & PLAN NOTE
-- Secondary to uncontrolled hypertension and diabetes  --Patient had TNK for acute CVA  --CT of the head and neck and CTA of the head and neck were negative  --Having some residual right sided weakness  --Follow-up with neurology  --Needs better diabetic and blood pressure control  --MRI showed left corona radiata stroke  --Continue statin with Lipitor  --Continue antiplatelet agent with aspirin    Orders:    Magnesium; Future    CBC w/o differential; Future    Albumin / creatinine urine ratio; Future    PTH, intact; Future    Vitamin D 25 hydroxy; Future    Phosphorus; Future    Comprehensive metabolic panel; Future    Ambulatory Referral to CKD Education Program; Future    Ambulatory Referral to Advanced Care Planning; Future    NIFEdipine (ADALAT CC) 90 mg 24 hr tablet; Take 1 tablet (90 mg total) by mouth daily

## 2024-12-11 NOTE — ASSESSMENT & PLAN NOTE
-- BMI 42.61  --Exercise weight loss diet adjustments  --Daily weights    Orders:    Magnesium; Future    CBC w/o differential; Future    Albumin / creatinine urine ratio; Future    PTH, intact; Future    Vitamin D 25 hydroxy; Future    Phosphorus; Future    Comprehensive metabolic panel; Future    Ambulatory Referral to CKD Education Program; Future    Ambulatory Referral to Advanced Care Planning; Future    NIFEdipine (ADALAT CC) 90 mg 24 hr tablet; Take 1 tablet (90 mg total) by mouth daily

## 2024-12-11 NOTE — ASSESSMENT & PLAN NOTE
-- Blood pressure not at target  --Target blood pressure less than 130/80  --Increase nifedipine to 90 mg daily  --Not on RAAS blockade due to worsening renal function    Orders:    Magnesium; Future    CBC w/o differential; Future    Albumin / creatinine urine ratio; Future    PTH, intact; Future    Vitamin D 25 hydroxy; Future    Phosphorus; Future    Comprehensive metabolic panel; Future    Ambulatory Referral to CKD Education Program; Future    Ambulatory Referral to Advanced Care Planning; Future    NIFEdipine (ADALAT CC) 90 mg 24 hr tablet; Take 1 tablet (90 mg total) by mouth daily

## 2024-12-11 NOTE — PROGRESS NOTES
Name: Tommie Taylor      : 1965      MRN: [id number]  Encounter Provider: Steven No MD  Encounter Date: 2024   Encounter department: [facility]  :  Assessment & Plan  Class 3 severe obesity due to excess calories with serious comorbidity and body mass index (BMI) of 40.0 to 44.9 in adult (HCC)  -- BMI 42.61  --Exercise weight loss diet adjustments  --Daily weights    Orders:    Magnesium; Future    CBC w/o differential; Future    Albumin / creatinine urine ratio; Future    PTH, intact; Future    Vitamin D 25 hydroxy; Future    Phosphorus; Future    Comprehensive metabolic panel; Future    Ambulatory Referral to CKD Education Program; Future    Ambulatory Referral to Advanced Care Planning; Future    NIFEdipine (ADALAT CC) 90 mg 24 hr tablet; Take 1 tablet (90 mg total) by mouth daily    Hospital discharge follow-up  -- Discharged in mid November after he presented to the hospital in mid October for right sided weakness and right facial droop.  An MRI revealed left corona radiata infarct.  He was administered TNK.  He was also placed on nicardipine for hypertensive emergency and started on antiplatelet agent.  --He was seen by nephrology for acute kidney injury, his peak creatinine was 4.57 mg/dL it was thought to be in the setting of hemodynamic fluctuations uncontrolled hypertension progression of disease autoregulatory failure and acute tumor necrosis  --His renal function improved and has plateaued in the mid to high 3 range establishing a new baseline creatinine  --Post discharge blood work showed a creatinine of 3.49 mg/dL and found to be stable with normal electrolytes.  --He does not know his medications for today    Orders:    Magnesium; Future    CBC w/o differential; Future    Albumin / creatinine urine ratio; Future    PTH, intact; Future    Vitamin D 25 hydroxy; Future    Phosphorus; Future    Comprehensive metabolic panel; Future    Ambulatory Referral to  CKD Education Program; Future    Ambulatory Referral to Advanced Care Planning; Future    NIFEdipine (ADALAT CC) 90 mg 24 hr tablet; Take 1 tablet (90 mg total) by mouth daily    Chronic kidney disease-mineral and bone disorder (CKD-MBD)  Lab Results   Component Value Date    EGFR 18 11/21/2024    EGFR 15 11/18/2024    EGFR 15 11/15/2024    CREATININE 3.49 (H) 11/21/2024    CREATININE 4.08 (H) 11/18/2024    CREATININE 3.94 (H) 11/15/2024   -- Phosphorus is normal.  Calcium is normal  -- Check PTH vitamin D 25-hydroxy level  -- Check a repeat magnesium level    Orders:    Magnesium; Future    CBC w/o differential; Future    Albumin / creatinine urine ratio; Future    PTH, intact; Future    Vitamin D 25 hydroxy; Future    Phosphorus; Future    Comprehensive metabolic panel; Future    Ambulatory Referral to CKD Education Program; Future    Ambulatory Referral to Advanced Care Planning; Future    NIFEdipine (ADALAT CC) 90 mg 24 hr tablet; Take 1 tablet (90 mg total) by mouth daily    Type 2 diabetes mellitus with stage 4 chronic kidney disease and hypertension (HCC)    Lab Results   Component Value Date    HGBA1C 8.6 (H) 10/16/2024     Chronic kidney disease stage IV (G4A3)  -- In the setting of poorly controlled hypertension and diabetes with progression of disease with also superimposed acute kidney injury  -- Patient now at a higher baseline creatinine now fluctuating mid to high 3's  -- Patient's preferred modality of dialysis is here in center hemodialysis  -- I have referred him to nephrology advanced care meeting and kidney smart education which she has not attended.  I will place those referrals for him again.  -- No urgent indication for renal placement therapy but given his poorly controlled comorbidities he is headed to dialysis in the near future.  -- Not on RAAS blockade due to progression of underlying disease  -- Avoid NSAIDs  -- Needs better blood pressure control  -- Needs better diabetic  control    Diabetes mellitus type 2  -- Yearly ophthalmologic and podiatry checks  -- Continue follow-up with endocrinology  -- Poorly controlled  --Hemoglobin A1c 8.6  -- Nephrotic range proteinuria in the past  -- Now with recent CVA    Hypertension  -- BMI 42.6  -- Blood pressure not at target recent CVA  -- Need to aim for blood pressure less than 130/80  -- Continue atenolol 25 mg daily, hydralazine 10 mg 3 times a day  -- Increase nifedipine to 90 mg daily  -- Home blood pressure monitoring  -- Low 2 g sodium diet    Orders:    Magnesium; Future    CBC w/o differential; Future    Albumin / creatinine urine ratio; Future    PTH, intact; Future    Vitamin D 25 hydroxy; Future    Phosphorus; Future    Comprehensive metabolic panel; Future    Ambulatory Referral to CKD Education Program; Future    Ambulatory Referral to Advanced Care Planning; Future    NIFEdipine (ADALAT CC) 90 mg 24 hr tablet; Take 1 tablet (90 mg total) by mouth daily    Cerebrovascular accident (CVA) due to occlusion of left middle cerebral artery (HCC)  -- Secondary to uncontrolled hypertension and diabetes  --Patient had TNK for acute CVA  --CT of the head and neck and CTA of the head and neck were negative  --Having some residual right sided weakness  --Follow-up with neurology  --Needs better diabetic and blood pressure control  --MRI showed left corona radiata stroke  --Continue statin with Lipitor  --Continue antiplatelet agent with aspirin    Orders:    Magnesium; Future    CBC w/o differential; Future    Albumin / creatinine urine ratio; Future    PTH, intact; Future    Vitamin D 25 hydroxy; Future    Phosphorus; Future    Comprehensive metabolic panel; Future    Ambulatory Referral to CKD Education Program; Future    Ambulatory Referral to Advanced Care Planning; Future    NIFEdipine (ADALAT CC) 90 mg 24 hr tablet; Take 1 tablet (90 mg total) by mouth daily    Primary hypertension  -- Blood pressure not at target  --Target blood  pressure less than 130/80  --Increase nifedipine to 90 mg daily  --Not on RAAS blockade due to worsening renal function    Orders:    Magnesium; Future    CBC w/o differential; Future    Albumin / creatinine urine ratio; Future    PTH, intact; Future    Vitamin D 25 hydroxy; Future    Phosphorus; Future    Comprehensive metabolic panel; Future    Ambulatory Referral to CKD Education Program; Future    Ambulatory Referral to Advanced Care Planning; Future    NIFEdipine (ADALAT CC) 90 mg 24 hr tablet; Take 1 tablet (90 mg total) by mouth daily        History of Present Illness   HPI  Tommie Taylor is a 59 y.o. male who presents follow-up of chronic kidney disease stage IV with recent acute kidney injury in November when he was hospitalized.  He was discharged in mid November after suffering a left corona radiata CVA resulting in right-sided weakness.  He developed acute kidney injury with a creatinine peaking at 4.5 mg/dL eventually improving and ranging around 3.5 to 4 mg/dL.  His most recent discharge creatinine was 3.49 mg/dL.  Electrolytes were stable.  Blood pressure and diabetes are poorly controlled.  Does not know his medications today.  Reviewed the blood work from November 21.  Creatinine at 3.49.  Sodium potassium are normal.  Magnesium 1.7 from November 18.    History obtained from: patient    Review of Systems  Current Outpatient Medications on File Prior to Visit   Medication Sig Dispense Refill    aspirin (ECOTRIN LOW STRENGTH) 81 mg EC tablet Take 1 tablet (81 mg total) by mouth daily      atenolol (TENORMIN) 25 mg tablet Take 1 tablet (25 mg total) by mouth daily 30 tablet 0    atorvastatin (LIPITOR) 20 mg tablet Take 1 tablet (20 mg total) by mouth daily 30 tablet 0    Blood Glucose Monitoring Suppl (FREESTYLE LITE) ANTONIA by Does not apply route 3 (three) times a day 1 each 0    buPROPion (WELLBUTRIN) 100 mg tablet Take 1 tablet (100 mg total) by mouth 2 (two) times a day Take bid--(once in the AM  and once at 12 noon) 60 tablet 0    calcium carbonate (TUMS) 500 mg chewable tablet Chew 2 tablets (1,000 mg total) 2 (two) times a day as needed for indigestion or heartburn      ergocalciferol (VITAMIN D2) 50,000 units Take 1 capsule (50,000 Units total) by mouth 3 (three) times a week 12 capsule 0    hydrALAZINE (APRESOLINE) 10 mg tablet Take 1 tablet (10 mg total) by mouth every 8 (eight) hours 90 tablet 0    insulin NPH-insulin regular (NovoLIN 70/30 ReliOn) 100 units/mL subcutaneous injection Take 20U in the AM, 10U at bedtime 3 mL 1    magnesium gluconate (MAGONATE) 500 MG tablet Take 1 tablet (500 mg total) by mouth daily Do not start before November 16, 2024. 30 tablet 0    OLANZapine (ZyPREXA) 10 mg tablet Take 1 tablet (10 mg total) by mouth daily at bedtime 30 tablet 0    pantoprazole (PROTONIX) 40 mg tablet Take 1 tablet (40 mg total) by mouth daily 90 tablet 3    traZODone (DESYREL) 50 mg tablet Take 1 tablet (50 mg total) by mouth daily at bedtime as needed for sleep 30 tablet 0    Valbenazine Tosylate (Ingrezza) 40 MG CAPS Take 40 mg by mouth daily at bedtime 90 capsule 0    [DISCONTINUED] NIFEdipine (PROCARDIA XL) 30 mg 24 hr tablet Take 2 tablets (60 mg total) by mouth daily 60 tablet 0    acetaminophen (TYLENOL) 325 mg tablet Take 2 tablets (650 mg total) by mouth every 6 (six) hours as needed for mild pain, fever or headaches      ammonium lactate (LAC-HYDRIN) 12 % cream Apply topically as needed for dry skin (Patient taking differently: Apply 1 Application topically as needed for dry skin bl le..as needed for dry skin) 385 g 0    Blood Glucose Monitoring Suppl (FreeStyle Lite) w/Device KIT USE AS INSTRUCTED 4 TIMES A DAY 1 kit 0    Continuous Blood Gluc  (FreeStyle Clarence 14 Day Nash) ANTONIA Use  to check BG at least 4 times a day 1 each 0    Continuous Blood Gluc Sensor (FreeStyle Clarence 14 Day Sensor) MISC Apply sensor every 14 days to check BG at least 4 times a day (Patient not  "taking: Reported on 4/24/2024) 2 each 5    FREESTYLE LITE test strip CHECK BLOOD SUGARS FOUR TIMES DAILY 400 strip 3    glucose monitoring kit (FREESTYLE) monitoring kit Use 1 each 4 (four) times a day Fine to substitute other brand if not covered by insurance (Patient not taking: Reported on 7/19/2024) 1 each 0    Insulin Pen Needle 31G X 8 MM MISC Check sugars three times a day 100 each 1    INSULIN SYRINGE .5CC/29G 29G X 1/2\" 0.5 ML MISC Use (Patient not taking: Reported on 7/19/2024)      Lancets (FREESTYLE) lancets USE THREE TIMES DAILY AS DIRECTED 300 each 0     No current facility-administered medications on file prior to visit.         Objective   /90 (BP Location: Left arm, Patient Position: Sitting, Cuff Size: Large)   Pulse 66   Resp (!) 98   Ht 5' 6\" (1.676 m)   Wt 120 kg (264 lb)   BMI 42.61 kg/m²      Physical Exam  Vitals and nursing note reviewed.   Constitutional:       General: He is not in acute distress.     Appearance: He is well-developed. He is obese. He is not diaphoretic.   HENT:      Head: Normocephalic and atraumatic.   Eyes:      General: No scleral icterus.     Pupils: Pupils are equal, round, and reactive to light.   Cardiovascular:      Rate and Rhythm: Normal rate and regular rhythm.      Heart sounds: Normal heart sounds. No murmur heard.     No friction rub. No gallop.   Pulmonary:      Effort: Pulmonary effort is normal. No respiratory distress.      Breath sounds: Normal breath sounds. No wheezing or rales.   Chest:      Chest wall: No tenderness.   Abdominal:      General: Bowel sounds are normal. There is no distension.      Palpations: Abdomen is soft.      Tenderness: There is no abdominal tenderness. There is no rebound.   Musculoskeletal:         General: Normal range of motion.      Cervical back: Normal range of motion and neck supple.   Skin:     Findings: No rash.   Neurological:      Mental Status: He is alert and oriented to person, place, and time.      " Motor: Weakness present.         Administrative Statements   I have spent a total time of 30 minutes in caring for this patient on the day of the visit/encounter including Diagnostic results, Prognosis, Risks and benefits of tx options, Instructions for management, Patient and family education, Importance of tx compliance, Risk factor reductions, Impressions, Counseling / Coordination of care, Documenting in the medical record, Reviewing / ordering tests, medicine, procedures  , and Obtaining or reviewing history  .

## 2024-12-12 NOTE — CASE COMMUNICATION
Med change  today by Dr. Steven mills [facility].   Increased Nifedipine to 90 mg daily from 60 mg daily.   suggested to take 3 tablets daily vs. 2 tablets daily until used up  New script is for 90 mg tablets.     Pt. agreed to  consult.  Will put request for order in Epic.     Phone call to Liza at office of [facility] requesting order for SW consult-- she said he was not wi th their office    ...............  Phone call w phone no given of PCP Layton Flores MD. That no reportedly is not the correct no.  [phone] at [facility] office.

## 2024-12-12 NOTE — CASE COMMUNICATION
Pt. agreed to SW consult to discuss programs available to him.   will request order for HC SW Consult

## 2024-12-13 ENCOUNTER — PATIENT OUTREACH (OUTPATIENT)
Dept: INTERNAL MEDICINE CLINIC | Facility: CLINIC | Age: 59
End: 2024-12-13

## 2024-12-13 ENCOUNTER — HOME CARE VISIT (OUTPATIENT)
Dept: HOME HEALTH SERVICES | Facility: HOME HEALTHCARE | Age: 59
End: 2024-12-13
Payer: MEDICARE

## 2024-12-13 VITALS — SYSTOLIC BLOOD PRESSURE: 152 MMHG | HEART RATE: 74 BPM | OXYGEN SATURATION: 100 % | DIASTOLIC BLOOD PRESSURE: 90 MMHG

## 2024-12-13 PROCEDURE — G0153 HHCP-SVS OF S/L PATH,EA 15MN: HCPCS

## 2024-12-13 PROCEDURE — G0151 HHCP-SERV OF PT,EA 15 MIN: HCPCS

## 2024-12-13 NOTE — PROGRESS NOTES
Outpatient Care Management Note:    Re:  follow up call     I called and spoke with patient and reviewed my role and reason for outreach. He reports he is feeling ok at this time and denies any symptoms or complaints. He reports he has his insulin and taking Lantus 30 units daily and meal time insulin (Humalog) taking 20 units with morning meal and 10 units with dinner. Reports only eats 2 meals a day. Reports does not have 70/30 insulin at home. Not checking blood sugars at this time. [facility] nurse placed CGM sensor on 12/6 and patient reports not working or getting any blood sugars. Reports he has reader charged, sensor does not appear to be loose. Encouraged calling customer support number for CGM to help further troubleshoot. Patient reports unable to place new sensor and wants to wait until next PCP appointment to help with placement. [facility] nurse no longer seeing patient and PT/OT and speech therapy in the home. Patient does not have back up glucometer and supplies for fingersticks. Will send message to PCP and clinical team to help address and send new scripts to [facility] on [facility]. Patient unsure of the last time he checked blood sugar or what blood sugar was. Educated on importance of taking medication as prescribed and checking blood sugars and following up with PCP further. Reviewed with patient he is scheduled Thursday 12/19 @ 4 pm with PCP office but also is scheduled with sleep medicine at 3:15 pm and appt notes states needs appointment in order to receive replacement pap. Patient not aware of both appointments. Will send to clerical team to help reschedule patient for another time with PCP office next week. No appointments available at this time and prefers afternoon appointment.     I did review Nephrology appointment with patient and that blood pressure medication Nifedipine was increased from 60 mg daily to 90 mg daily. Patient has not picked up no dose at pharmacy yet. Encouraged to do so.  Patient reports he has blood pressure machine at home. Encouraged to check BP several days a week and keep log. Reviewed target blood pressure is less than 130/80.     Encouraged following a diabetic and low sodium diet. Will plan to educate further during another outreach.        English

## 2024-12-14 ENCOUNTER — HOME CARE VISIT (OUTPATIENT)
Dept: HOME HEALTH SERVICES | Facility: HOME HEALTHCARE | Age: 59
End: 2024-12-14
Payer: MEDICARE

## 2024-12-14 NOTE — CASE COMMUNICATION
Requested order for MSW.  Waiting until visit is scheduled before I plan to DC.   Probably friday 12.20.24 will be my dc date from [facility].

## 2024-12-16 ENCOUNTER — HOME CARE VISIT (OUTPATIENT)
Dept: HOME HEALTH SERVICES | Facility: HOME HEALTHCARE | Age: 59
End: 2024-12-16
Payer: MEDICARE

## 2024-12-16 NOTE — CASE COMMUNICATION
YOSEF.     Pt. reportedly is not taking insulin for a few days since his glucometer is not working.  PCP reportedly is aware and they are working on getting a new one.

## 2024-12-17 ENCOUNTER — HOME CARE VISIT (OUTPATIENT)
Dept: HOME HEALTH SERVICES | Facility: HOME HEALTHCARE | Age: 59
End: 2024-12-17
Payer: MEDICARE

## 2024-12-17 ENCOUNTER — TELEPHONE (OUTPATIENT)
Dept: INTERNAL MEDICINE CLINIC | Facility: CLINIC | Age: 59
End: 2024-12-17

## 2024-12-17 ENCOUNTER — TELEPHONE (OUTPATIENT)
Dept: BARIATRICS | Facility: CLINIC | Age: 59
End: 2024-12-17

## 2024-12-17 VITALS — SYSTOLIC BLOOD PRESSURE: 170 MMHG | OXYGEN SATURATION: 100 % | DIASTOLIC BLOOD PRESSURE: 98 MMHG | HEART RATE: 98 BPM

## 2024-12-17 PROCEDURE — G0151 HHCP-SERV OF PT,EA 15 MIN: HCPCS

## 2024-12-17 PROCEDURE — G0152 HHCP-SERV OF OT,EA 15 MIN: HCPCS

## 2024-12-17 NOTE — ASSESSMENT & PLAN NOTE
Continue on atorvastatin   Subjective     Olinda Trevino is a 38 y.o. female who presents with Headache (Green mucus, vomiting, body aches, chills, ear pain, x 2 wks)    HPI:  Olinda Trevino is a 38 y.o. female who presents today for evaluation of URI symptoms.  Patient notes that she has been sick for approximately 2 weeks.  She says that symptoms started as general cold-like symptoms.  She had a few days of sore throat with congestion, sinus pressure, headaches, ear discomfort.  She was seen in the urgent care on 12/9 for evaluation of her symptoms.  That time she was diagnosed with acute maxillary sinusitis as well as left otitis media.  She was placed on a 10-day course of clarithromycin.  She says that she has been taking the antibiotic as prescribed.  She has also been using over-the-counter medication such as Sudafed and using Afrin every few days.  She says that nothing really seems to be helping.  Her symptoms continue to worsen.  She notes that her ear pressure and sore throat have been resolving but she still has a lot of congestion, sinus pressure, blowing out green/yellow mucus.  She says that the cough is significantly worsen and she has cough with chest congestion, shortness of breath, occasional wheezing.  She says that sometimes she coughs so much to the point where she wants to vomit afterwards.  She has not had any measured fever but has continued to have body aches and chills intermittently.        Review of Systems   Constitutional:  Positive for chills and malaise/fatigue. Negative for fever.   HENT:  Positive for congestion, ear pain (mostly resolved), sinus pain and sore throat (resolved).    Respiratory:  Positive for cough, sputum production, shortness of breath and wheezing.    Cardiovascular:  Positive for chest pain.   Gastrointestinal:  Positive for abdominal pain, diarrhea, nausea and vomiting.   Musculoskeletal:  Positive for myalgias.   Neurological:  Positive for headaches.         PMH:   "has a past medical history of Labor abnormal (4/13/2017).  MEDS:   Current Outpatient Medications:     clarithromycin (BIAXIN) 500 MG Tab, Take 1 Tablet by mouth 2 times a day for 10 days., Disp: 20 Tablet, Rfl: 0    allopurinol (ZYLOPRIM) 100 MG Tab, Take 1 Tablet by mouth every day., Disp: 90 Tablet, Rfl: 0    lovastatin (MEVACOR) 10 MG tablet, Take 1 Tablet by mouth every evening., Disp: 90 Tablet, Rfl: 1    metFORMIN ER (GLUCOPHAGE XR) 500 MG TABLET SR 24 HR, Take 1 Tablet by mouth every day for 90 days., Disp: 90 Tablet, Rfl: 0    sertraline (ZOLOFT) 50 MG Tab, 1/2 tablet p.o. daily for the first 7 days then increase to 1 tablet p.o. daily (Patient not taking: Reported on 12/17/2024), Disp: 30 Tablet, Rfl: 0  ALLERGIES:   Allergies   Allergen Reactions    Cefdinir Rash     Hands and feet broke out in rash, took benadryl      SURGHX: No past surgical history on file.  SOCHX:  reports that she has never smoked. She has never used smokeless tobacco. She reports that she does not drink alcohol and does not use drugs.  FH: Family history was reviewed, no pertinent findings to report      Objective     /82   Pulse (!) 116   Temp 36.8 °C (98.2 °F)   Resp 20   Ht 1.6 m (5' 3\")   Wt 86.1 kg (189 lb 14.4 oz)   SpO2 98%   BMI 33.64 kg/m²      Physical Exam  Constitutional:       Appearance: She is well-developed.   HENT:      Head: Normocephalic and atraumatic.      Right Ear: Tympanic membrane, ear canal and external ear normal.      Left Ear: Tympanic membrane, ear canal and external ear normal.      Nose: Mucosal edema present. No congestion or rhinorrhea.      Right Sinus: Maxillary sinus tenderness present. No frontal sinus tenderness.      Left Sinus: Maxillary sinus tenderness present. No frontal sinus tenderness.      Mouth/Throat:      Lips: Pink.      Mouth: Mucous membranes are moist.      Pharynx: Oropharynx is clear.   Eyes:      Conjunctiva/sclera: Conjunctivae normal.      Pupils: Pupils are " equal, round, and reactive to light.   Cardiovascular:      Rate and Rhythm: Regular rhythm. Tachycardia present.      Heart sounds: Normal heart sounds. No murmur heard.  Pulmonary:      Effort: Pulmonary effort is normal.      Breath sounds: Examination of the right-lower field reveals wheezing. Examination of the left-lower field reveals wheezing. Wheezing present.   Musculoskeletal:      Cervical back: Normal range of motion.   Lymphadenopathy:      Cervical: No cervical adenopathy.   Skin:     General: Skin is warm and dry.      Capillary Refill: Capillary refill takes less than 2 seconds.   Neurological:      Mental Status: She is alert and oriented to person, place, and time.   Psychiatric:         Behavior: Behavior normal.         Judgment: Judgment normal.       DX-CHEST-2 VIEWS  FINDINGS:  Cardiomediastinal contour is within normal limits.  No focal pulmonary consolidation.  No pleural fluid collection or pneumothorax.  Pectus deformity noted.     IMPRESSION:  No acute cardiopulmonary disease.      *X-rays were reviewed and interpreted independently by me. I agree with the radiologist's findings     Assessment & Plan     1. Acute cough  - DX-CHEST-2 VIEWS; Future    2. Respiratory tract infection  - methylPREDNISolone (MEDROL DOSEPAK) 4 MG Tablet Therapy Pack; Follow schedule on package instructions.  Dispense: 21 Tablet; Refill: 0  - promethazine-dextromethorphan (PROMETHAZINE-DM) 6.25-15 MG/5ML syrup; Take 5 mL by mouth every four hours as needed for Cough.  Dispense: 120 mL; Refill: 0  - albuterol 108 (90 Base) MCG/ACT Aero Soln inhalation aerosol; Inhale 2 Puffs every 6 hours as needed for Shortness of Breath (or cough).  Dispense: 8.5 g; Refill: 0  - amoxicillin-clavulanate (AUGMENTIN) 875-125 MG Tab; Take 1 Tablet by mouth 2 times a day for 7 days.  Dispense: 14 Tablet; Refill: 0    Patient presents today for evaluation of persistent and worsening upper respiratory symptoms and cough.  Chest x-ray  is negative for any acute cardiopulmonary process.  She is almost through 10-day course of clarithromycin but still continued to have sinus pressure with green nasal discharge and maxillary tenderness noted on exam.  Recommend that we discontinue the clarithromycin and switch to Augmentin which is a bit more broad-spectrum and covers for possibility of S. pneumoniae as well.  Will also treat with a steroid taper and albuterol inhaler.  She has not had relief with benzonatate capsules in the past but I have also sent over a promethazine-DM cough syrup to help.  At home she may continue to use OTC medication.  She can also continue supportive care measures to include the use of saline nasal rinses, steam inhalation, use of a cool-mist humidifier in the bedroom at night.  She has an appointment on Monday to see her primary care provider.  She can follow-up with them if her symptoms fail to improve at all.        Differential Diagnosis, natural history, and supportive care discussed. Return to the Urgent Care or follow up with your PCP if symptoms fail to resolve, or for any new or worsening symptoms. Emergency room precautions discussed. Patient and/or family appears understanding of information.

## 2024-12-17 NOTE — TELEPHONE ENCOUNTER
Brittany from Occupational Therapy called to let doctor know patients BP was 174/110, no headaches. Please contact patient directly with any questions

## 2024-12-18 ENCOUNTER — PATIENT OUTREACH (OUTPATIENT)
Dept: INTERNAL MEDICINE CLINIC | Facility: CLINIC | Age: 59
End: 2024-12-18

## 2024-12-18 ENCOUNTER — HOME CARE VISIT (OUTPATIENT)
Dept: HOME HEALTH SERVICES | Facility: HOME HEALTHCARE | Age: 59
End: 2024-12-18
Payer: MEDICARE

## 2024-12-18 DIAGNOSIS — Z79.4 TYPE 2 DIABETES MELLITUS WITH HYPERGLYCEMIA, WITH LONG-TERM CURRENT USE OF INSULIN (HCC): ICD-10-CM

## 2024-12-18 DIAGNOSIS — E11.42 DIABETIC POLYNEUROPATHY ASSOCIATED WITH TYPE 2 DIABETES MELLITUS (HCC): ICD-10-CM

## 2024-12-18 DIAGNOSIS — E11.65 TYPE 2 DIABETES MELLITUS WITH HYPERGLYCEMIA, WITH LONG-TERM CURRENT USE OF INSULIN (HCC): ICD-10-CM

## 2024-12-18 DIAGNOSIS — Z79.4 TYPE 2 DIABETES MELLITUS WITHOUT COMPLICATION, WITH LONG-TERM CURRENT USE OF INSULIN (HCC): ICD-10-CM

## 2024-12-18 DIAGNOSIS — E11.9 TYPE 2 DIABETES MELLITUS WITHOUT COMPLICATION, WITH LONG-TERM CURRENT USE OF INSULIN (HCC): ICD-10-CM

## 2024-12-18 DIAGNOSIS — E11.65 TYPE 2 DIABETES MELLITUS WITH HYPERGLYCEMIA, UNSPECIFIED WHETHER LONG TERM INSULIN USE (HCC): ICD-10-CM

## 2024-12-18 PROCEDURE — G0153 HHCP-SVS OF S/L PATH,EA 15MN: HCPCS

## 2024-12-18 RX ORDER — ATORVASTATIN CALCIUM 20 MG/1
20 TABLET, FILM COATED ORAL DAILY
Qty: 90 TABLET | Refills: 3 | Status: SHIPPED | OUTPATIENT
Start: 2024-12-18 | End: 2024-12-20 | Stop reason: SDUPTHER

## 2024-12-18 RX ORDER — LANCETS 28 GAUGE
EACH MISCELLANEOUS
Qty: 300 EACH | Refills: 0 | Status: CANCELLED | OUTPATIENT
Start: 2024-12-18

## 2024-12-18 RX ORDER — BLOOD-GLUCOSE METER
KIT MISCELLANEOUS
Qty: 400 STRIP | Refills: 3 | Status: SHIPPED | OUTPATIENT
Start: 2024-12-18

## 2024-12-18 RX ORDER — BLOOD-GLUCOSE METER
1 KIT MISCELLANEOUS 4 TIMES DAILY
Qty: 1 KIT | Refills: 2 | Status: SHIPPED | OUTPATIENT
Start: 2024-12-18

## 2024-12-18 RX ORDER — LANCETS 28 GAUGE
EACH MISCELLANEOUS
Qty: 300 EACH | Refills: 0 | Status: SHIPPED | OUTPATIENT
Start: 2024-12-18

## 2024-12-18 NOTE — PROGRESS NOTES
Outpatient Care Management Note:    Re: PCP appointment    Followed up with clerical team to see if they can reach out to patient as he is scheduled with sleep medicine at 3:15 pm and PCP at 4 pm on Thursday 12/19 and may not make it in time. I sent inbasket message last week to clerical team to assist with rescheduling patient. Patient rescheduled to Friday 12/20 @ 11 am with Dr. Simon and was instructed to bring all medication and blood sugar readings.     Will request again for glucometer and supplies to be sent to pharmacy as when I last spoke with patient he did not have back up glucometer and supplies for fingersticks. Patient to be using CGM.    Last spoke with patient was not taking insulin as prescribed. Please see last outreach note.

## 2024-12-19 ENCOUNTER — PATIENT OUTREACH (OUTPATIENT)
Dept: INTERNAL MEDICINE CLINIC | Facility: CLINIC | Age: 59
End: 2024-12-19

## 2024-12-19 VITALS — TEMPERATURE: 98.1 F | HEART RATE: 76 BPM | OXYGEN SATURATION: 97 %

## 2024-12-19 VITALS — HEART RATE: 80 BPM | DIASTOLIC BLOOD PRESSURE: 110 MMHG | OXYGEN SATURATION: 94 % | SYSTOLIC BLOOD PRESSURE: 174 MMHG

## 2024-12-19 VITALS — SYSTOLIC BLOOD PRESSURE: 174 MMHG | HEART RATE: 70 BPM | DIASTOLIC BLOOD PRESSURE: 110 MMHG

## 2024-12-19 NOTE — PROGRESS NOTES
Outpatient Care Management Note:    Re:  glucometer    I called [facility] pharmacy to follow up on glucometer and supplies. Unable to talk with anyone directly and unable to leave message. I called twice and waited on hold and then call disconnected after several minutes.     Calling to see they went through insurance and if patient picked up yet.     Patient scheduled to come into PCP office tomorrow.

## 2024-12-20 ENCOUNTER — HOME CARE VISIT (OUTPATIENT)
Dept: HOME HEALTH SERVICES | Facility: HOME HEALTHCARE | Age: 59
End: 2024-12-20
Payer: MEDICARE

## 2024-12-20 ENCOUNTER — OFFICE VISIT (OUTPATIENT)
Dept: INTERNAL MEDICINE CLINIC | Facility: CLINIC | Age: 59
End: 2024-12-20

## 2024-12-20 VITALS
HEART RATE: 66 BPM | DIASTOLIC BLOOD PRESSURE: 67 MMHG | WEIGHT: 264 LBS | TEMPERATURE: 97.7 F | BODY MASS INDEX: 42.61 KG/M2 | SYSTOLIC BLOOD PRESSURE: 137 MMHG

## 2024-12-20 DIAGNOSIS — E11.65 TYPE 2 DIABETES MELLITUS WITH HYPERGLYCEMIA, WITH LONG-TERM CURRENT USE OF INSULIN (HCC): ICD-10-CM

## 2024-12-20 DIAGNOSIS — E11.9 TYPE 2 DIABETES MELLITUS WITHOUT COMPLICATION, UNSPECIFIED WHETHER LONG TERM INSULIN USE (HCC): ICD-10-CM

## 2024-12-20 DIAGNOSIS — E11.65 TYPE 2 DIABETES MELLITUS WITH HYPERGLYCEMIA, UNSPECIFIED WHETHER LONG TERM INSULIN USE (HCC): ICD-10-CM

## 2024-12-20 DIAGNOSIS — Z79.4 TYPE 2 DIABETES MELLITUS WITH HYPERGLYCEMIA, WITH LONG-TERM CURRENT USE OF INSULIN (HCC): ICD-10-CM

## 2024-12-20 PROCEDURE — G0155 HHCP-SVS OF CSW,EA 15 MIN: HCPCS

## 2024-12-20 PROCEDURE — G0153 HHCP-SVS OF S/L PATH,EA 15MN: HCPCS

## 2024-12-20 RX ORDER — BLOOD-GLUCOSE METER
KIT MISCELLANEOUS
Qty: 1 KIT | Refills: 0 | Status: SHIPPED | OUTPATIENT
Start: 2024-12-20

## 2024-12-20 RX ORDER — ATORVASTATIN CALCIUM 20 MG/1
20 TABLET, FILM COATED ORAL DAILY
Qty: 90 TABLET | Refills: 3 | Status: SHIPPED | OUTPATIENT
Start: 2024-12-20

## 2024-12-20 NOTE — CASE COMMUNICATION
YOSEF   No action required.     Pt agreed to set up med chart as a double check to record when he takes his meds.   Even though he says he takes his meds, due to his cognitive deficits, recommended a chart.     Note. Pt. still does not have a working Glucometer . Patient is to follow up with MD regarding this.   Reportedly Has apt with Abner Simon MD this friday.

## 2024-12-20 NOTE — TELEPHONE ENCOUNTER
Resent Atorvastatin script for refill to [facility] pharmacy due to previous failed transmission.

## 2024-12-20 NOTE — PROGRESS NOTES
Patient was seen 12/20/24 and received two glucometer and sent supplies at pharmacy per Dr. Simon's note from today's visit.

## 2024-12-20 NOTE — PROGRESS NOTES
Name: Tommie Taylor      : 1965      MRN: [id number]  Encounter Provider: Abner Simon MD  Encounter Date: 2024   Encounter department: [facility]  :  Assessment & Plan  Type 2 diabetes mellitus without complication, unspecified whether long term insulin use (HCC)    Lab Results   Component Value Date    HGBA1C 8.6 (H) 10/16/2024   Patient is currently taking insulin 70/30 20u in the morning and 10u at night but has not taken it in 2 weeks due to not having working glucometer. Multiple scripts were sent to patient's pharmacy but patient did not pick it up. Patient was given 2 working glucometers today in clinic but still instructed to go to his pharmacy to  his prescriptions and glucometer.   Continue insulin 70/30 20u and 10u  Follow up in 1 month         History of Present Illness     Patient is a 60 y/o PMH of Bipolar, DM, HTN, HLD, DAISY present for medication review. Med Rec completed and chart updated.      Review of Systems   Constitutional:  Negative for chills and fever.   HENT:  Negative for ear pain and sore throat.    Eyes:  Negative for pain and visual disturbance.   Respiratory:  Negative for cough and shortness of breath.    Cardiovascular:  Negative for chest pain and palpitations.   Gastrointestinal:  Negative for abdominal pain and vomiting.   Genitourinary:  Negative for dysuria and hematuria.   Musculoskeletal:  Negative for arthralgias and back pain.   Skin:  Negative for color change and rash.   Neurological:  Negative for syncope.   All other systems reviewed and are negative.      Objective   /67 (BP Location: Left arm, Patient Position: Sitting, Cuff Size: Large)   Pulse 66   Temp 97.7 °F (36.5 °C) (Temporal)      Physical Exam  Vitals and nursing note reviewed.   Constitutional:       General: He is not in acute distress.     Appearance: He is well-developed.   HENT:      Head: Normocephalic and atraumatic.   Eyes:       Conjunctiva/sclera: Conjunctivae normal.   Cardiovascular:      Rate and Rhythm: Normal rate and regular rhythm.      Heart sounds: No murmur heard.  Pulmonary:      Effort: Pulmonary effort is normal. No respiratory distress.      Breath sounds: Normal breath sounds.   Abdominal:      Palpations: Abdomen is soft.      Tenderness: There is no abdominal tenderness.   Musculoskeletal:         General: No swelling.      Cervical back: Neck supple.   Skin:     General: Skin is warm and dry.      Capillary Refill: Capillary refill takes less than 2 seconds.   Neurological:      Mental Status: He is alert and oriented to person, place, and time.   Psychiatric:         Mood and Affect: Mood normal.

## 2024-12-22 VITALS — HEART RATE: 73 BPM | OXYGEN SATURATION: 95 % | TEMPERATURE: 97.7 F

## 2024-12-26 ENCOUNTER — HOME CARE VISIT (OUTPATIENT)
Dept: HOME HEALTH SERVICES | Facility: HOME HEALTHCARE | Age: 59
End: 2024-12-26
Payer: MEDICARE

## 2024-12-26 VITALS
SYSTOLIC BLOOD PRESSURE: 116 MMHG | OXYGEN SATURATION: 93 % | DIASTOLIC BLOOD PRESSURE: 66 MMHG | HEART RATE: 93 BPM | TEMPERATURE: 98.1 F

## 2024-12-26 PROCEDURE — G0153 HHCP-SVS OF S/L PATH,EA 15MN: HCPCS

## 2024-12-27 ENCOUNTER — HOME CARE VISIT (OUTPATIENT)
Dept: HOME HEALTH SERVICES | Facility: HOME HEALTHCARE | Age: 59
End: 2024-12-27

## 2024-12-27 DIAGNOSIS — I10 PRIMARY HYPERTENSION: ICD-10-CM

## 2024-12-28 ENCOUNTER — HOME CARE VISIT (OUTPATIENT)
Dept: HOME HEALTH SERVICES | Facility: HOME HEALTHCARE | Age: 59
End: 2024-12-28
Payer: MEDICARE

## 2024-12-28 VITALS
TEMPERATURE: 98.1 F | HEART RATE: 74 BPM | DIASTOLIC BLOOD PRESSURE: 84 MMHG | SYSTOLIC BLOOD PRESSURE: 142 MMHG | OXYGEN SATURATION: 96 %

## 2024-12-28 PROCEDURE — G0153 HHCP-SVS OF S/L PATH,EA 15MN: HCPCS

## 2024-12-30 ENCOUNTER — PATIENT OUTREACH (OUTPATIENT)
Dept: INTERNAL MEDICINE CLINIC | Facility: CLINIC | Age: 59
End: 2024-12-30

## 2024-12-30 ENCOUNTER — SOCIAL WORK (OUTPATIENT)
Dept: BEHAVIORAL/MENTAL HEALTH CLINIC | Facility: CLINIC | Age: 59
End: 2024-12-30
Payer: MEDICARE

## 2024-12-30 DIAGNOSIS — F41.0 PANIC ATTACKS: ICD-10-CM

## 2024-12-30 DIAGNOSIS — G47.00 INSOMNIA, UNSPECIFIED TYPE: ICD-10-CM

## 2024-12-30 DIAGNOSIS — F41.1 GENERALIZED ANXIETY DISORDER: ICD-10-CM

## 2024-12-30 DIAGNOSIS — F31.76 BIPOLAR I DISORDER, MOST RECENT EPISODE DEPRESSED, IN FULL REMISSION (HCC): Primary | ICD-10-CM

## 2024-12-30 PROCEDURE — 90837 PSYTX W PT 60 MINUTES: CPT | Performed by: SOCIAL WORKER

## 2024-12-30 RX ORDER — ATENOLOL 25 MG/1
25 TABLET ORAL DAILY
Qty: 90 TABLET | Refills: 3 | Status: SHIPPED | OUTPATIENT
Start: 2024-12-30 | End: 2024-12-31 | Stop reason: SDUPTHER

## 2024-12-30 NOTE — CASE COMMUNICATION
This message is informative only.  No action required.    Note. Vitals on the following days.  He reportedly took bp medication on both days   12.26.24  bp  116.66  12.28.24  bp  142/84      Pt was DCed from  ST and [facility]NA services 12.28.24       Impression. WFL which is an increase from Mild which is an increase from Moderate Dysarthria characterized by limited articulatory postures and periiods of unintelligible speech. Observed intell igible conversational speech to be 5.1 word MLU which is an increase from 4.4 word MLU (mean lenght of utterance). on 11.22.24 Cognitive linguistic deficits. progress made but continues to observe  mild which is an increase from  mild.moderate in immediate memory, and STM. Mild.moderate which is an increase from Moderate in problem solving and mild  which is an increase from severe in organization.  Oriented x 3.   Pt denied any difficu lty swallowing and is tolerating a regular diet w thin liquids. Hearing. WFL Vision. He reportedly has difficulty with vision and reading glasses reportedly do not help     Rec.    DC sp tx due to goals achieved   Cont Out Pt Sp tx if desired.   Note He reportedly did not want Out PT, OT or ST at present.  Talk to  if Out Pt tx is desired in the future. to request orders   Practice assignments given  Cont to record when bp meds taken  and blood glucose readings taken    Refer if change in status

## 2024-12-30 NOTE — PROGRESS NOTES
Medication Patient Assistance Program (MPAP) Specialist  received IN Basket message from RN CM regarding patient's insulin affordability. MPAP specialist reviewed patient chart prior to outreach. Per chart review patient has enough of his Humalog and Lantus until his part D plan reinstates in January. Per chart review patient is not taking 70/30 insulin.   MPAP specialist attempted to reach patient to discuss his ability to afford his medications.MPAP specialist left voicemail with her name (Bettina Rubalcava), stated she was patient assistance  and number [phone]. MPAP specialist requested a return call. MPAP specialist will follow up with patient if no return call is received. MPAP specialist has placed a personal reminder.

## 2024-12-30 NOTE — PSYCH
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 He received services in the home. He admits to being more emotional since the CVA and he admits to having more increased anxiety. He shared his girlfriend Marie was having a difficult time dealing with her own health issues. She remains on dialysis. Also when at first he was not doing as well as he currently is she was having a very difficult time per Orlando dealing with his issue. Orlando shared CPS took his great niece and his 2 grandkids due to the condition of the house. The grandkids ended up with their grandmom on their dad's side and his great niece ended up with his grilfriends niece. Orlando admitted it is now less stress on him however his mood was laible where he intermittently cried due to his stroke.  Orlando also shared he needs to buy another car. I provided empathy, support and strategies to cope.   During this session, this clinician used the following therapeutic modalities: Client-centered Therapy, Cognitive Behavioral Therapy, Mindfulness-based Strategies, and Supportive Psychotherapy    Substance Abuse was not addressed during this session. If the client is diagnosed with a co-occurring substance use disorder, please indicate any changes in the frequency or amount of use: n/a. Stage of change for addressing substance use diagnoses: No substance use/Not applicable    ASSESSMENT:  Tommie Taylor presents with a Anxious mood.     his affect is anxious which is congruent, with his mood and the content of the session. The client has made progress on their goals but now is dealing with the aftermath of enduring a CVA.      Tommie Taylor presents with a none risk of suicide, none risk of self-harm, and none risk of harm to others.    For any risk assessment  "that surpasses a \"low\" rating, a safety plan must be developed.    A safety plan was indicated: no  If yes, describe in detail n/a    PLAN: Between sessions, Tommie Taylor will use mindfulness and CBT. At the next session, the therapist will use Client-centered Therapy, Cognitive Behavioral Therapy, Mindfulness-based Strategies, and Supportive Psychotherapy to address issues and symptoms as they may arise. .    Behavioral Health Treatment Plan and Discharge Planning: Tommie Taylor is aware of and agrees to continue to work on their treatment plan. They have identified and are working toward their discharge goals. yes    Depression Follow-up Plan Completed: Not applicable    Visit start and stop times:    12/30/24     "

## 2024-12-31 DIAGNOSIS — I10 PRIMARY HYPERTENSION: ICD-10-CM

## 2024-12-31 RX ORDER — ATENOLOL 25 MG/1
25 TABLET ORAL DAILY
Qty: 90 TABLET | Refills: 3 | Status: SHIPPED | OUTPATIENT
Start: 2024-12-31

## 2025-01-06 ENCOUNTER — OFFICE VISIT (OUTPATIENT)
Dept: NEUROLOGY | Facility: CLINIC | Age: 60
End: 2025-01-06
Payer: MEDICARE

## 2025-01-06 ENCOUNTER — PATIENT OUTREACH (OUTPATIENT)
Dept: INTERNAL MEDICINE CLINIC | Facility: CLINIC | Age: 60
End: 2025-01-06

## 2025-01-06 VITALS
HEIGHT: 66 IN | WEIGHT: 263 LBS | HEART RATE: 70 BPM | TEMPERATURE: 98.2 F | DIASTOLIC BLOOD PRESSURE: 84 MMHG | OXYGEN SATURATION: 98 % | BODY MASS INDEX: 42.27 KG/M2 | SYSTOLIC BLOOD PRESSURE: 158 MMHG

## 2025-01-06 DIAGNOSIS — I12.9 TYPE 2 DIABETES MELLITUS WITH STAGE 4 CHRONIC KIDNEY DISEASE AND HYPERTENSION (HCC): ICD-10-CM

## 2025-01-06 DIAGNOSIS — R29.898 WEAKNESS OF RIGHT ARM: ICD-10-CM

## 2025-01-06 DIAGNOSIS — N18.4 TYPE 2 DIABETES MELLITUS WITH STAGE 4 CHRONIC KIDNEY DISEASE AND HYPERTENSION (HCC): ICD-10-CM

## 2025-01-06 DIAGNOSIS — E11.22 TYPE 2 DIABETES MELLITUS WITH STAGE 4 CHRONIC KIDNEY DISEASE AND HYPERTENSION (HCC): ICD-10-CM

## 2025-01-06 DIAGNOSIS — G47.33 OSA (OBSTRUCTIVE SLEEP APNEA): ICD-10-CM

## 2025-01-06 DIAGNOSIS — Z86.73 HISTORY OF CVA (CEREBROVASCULAR ACCIDENT): ICD-10-CM

## 2025-01-06 DIAGNOSIS — Z79.4 TYPE 2 DIABETES MELLITUS WITH HYPERGLYCEMIA, WITH LONG-TERM CURRENT USE OF INSULIN (HCC): ICD-10-CM

## 2025-01-06 DIAGNOSIS — E11.65 TYPE 2 DIABETES MELLITUS WITH HYPERGLYCEMIA, WITH LONG-TERM CURRENT USE OF INSULIN (HCC): ICD-10-CM

## 2025-01-06 DIAGNOSIS — I10 PRIMARY HYPERTENSION: ICD-10-CM

## 2025-01-06 DIAGNOSIS — Z79.4 TYPE 2 DIABETES MELLITUS WITHOUT COMPLICATION, WITH LONG-TERM CURRENT USE OF INSULIN (HCC): ICD-10-CM

## 2025-01-06 DIAGNOSIS — E78.2 MIXED HYPERLIPIDEMIA: Primary | ICD-10-CM

## 2025-01-06 DIAGNOSIS — E11.9 TYPE 2 DIABETES MELLITUS WITHOUT COMPLICATION, WITH LONG-TERM CURRENT USE OF INSULIN (HCC): ICD-10-CM

## 2025-01-06 PROCEDURE — 99214 OFFICE O/P EST MOD 30 MIN: CPT

## 2025-01-06 NOTE — ASSESSMENT & PLAN NOTE
- Does have upcoming appointment and evaluation with sleep medicine in regards to his DAISY.  Encouraged to follow-up with this appointment.

## 2025-01-06 NOTE — PROGRESS NOTES
Name: Tommie Taylor      : 1965      MRN: [id number]  Encounter Provider: Harshad Romo PA-C  Encounter Date: 2025   Encounter department: [facility]  :  Assessment & Plan  History of CVA (cerebrovascular accident)  I had the pleasure of seeing Tommie today in the office at [facility] in [place].  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  The patient reports no new strokelike symptoms at this time.  He does explain that he still has weakness of the right upper extremity which continues to improve and has some slurred speech.  The patient received physical therapy at the acute rehab floor at [facility].  Does not have any outpatient therapy at this time so placed ambulatory referral to physical therapy to further work on the patient's right arm weakness.  Patient taking aspirin 81 mg daily and atorvastatin 20 mg daily.  Patient's blood pressure in the office was 158/84, is noted that he did just take his blood pressure medication right before coming to the appointment.  Most recent LDL was 90 mg/dL most recent hemoglobin A1c 8.6%.  Was noted that the patient does have an upcoming appointment with cardiology for further evaluation and potential Zio patch/loop recorder to evaluate and rule out any  cardioembolic etiology of stroke.  Noted that the patient's primary care provider is currently managing his diabetes.  It is noted that the patient had not been eating healthy prior to her stroke, certainly working on trying to improve this.  He had been doing some walking at home but not much activity outside of needing physical therapy.  No issues with sleep at this time.  He does have sleep apnea and has an appointment to see his sleep medicine specialist.  No depression/anxiety noted.  Patient quit smoking about 40 to 50 years ago.   At this time, encouraged patient to complete lipid panel before his next visit to evaluate his LDL levels to make sure his atorvastatin was working effectively for him.  Advised patient to continue on all his current medications for secondary stroke prevention.  Advised that we will follow-up in 4 months time for further evaluation.    - Ambulatory referral to physical therapy placed for right upper extremity weakness/paresis.  Would like for the patient to continue to work on stroke related deficits at this time.  Patient did have some therapy at the [facility] rehab center.  Would like to continue therapy on outpatient basis currently.  - Would like for the patient to complete lipid panel blood work before his next visit.  Would like to see how his atorvastatin is working at reducing his LDL cholesterol.  - Advised patient to continue to follow with current cardiology referral at this point in time.  Advised the patient that we would likely require additional monitoring such as Zio patch/loop recorder for further evaluation of the patient's etiology of most recent infarct.  Consider further evaluation by cardiology to manage hypertension as well  - Continue to follow with PCP in regards to managing patient's type 2 diabetes mellitus.    - For ongoing stroke prevention continue: Aspirin 81 mg once daily, atorvastatin 20 mg once daily  - Discussed the importance of antiplatelet/anticoagulation management with the patient to prevent future strokes.   - Recommend to check blood pressure occasionally away from the doctor's office to make sure that those numbers are typically less than 130/80.  If they are frequently higher than that, we recommend checking a little more often and to follow up with primary care team   - Will defer to primary care team for monitoring of cholesterol panel and blood sugar numbers with target LDL cholesterol of less than 70 and hemoglobin A1c less than 7%  - Recommend following a low  salt, mediterranean diet   - Recommend routine physical exercise as tolerated       Orders:    Ambulatory Referral to Neurology    Lipid Panel with Direct LDL reflex; Future    Ambulatory Referral to Physical Therapy; Future    Primary hypertension  - Continue to follow with PCP/cardiology in regards to BP control.  Blood pressure in the office today was 158/84 although the patient took his medication right before he came today.  - Advised patient to continue on current antihypertensive medication regimen with atenolol, hydralazine, and nifedipine at this time       Type 2 diabetes mellitus with stage 4 chronic kidney disease and hypertension (HCC)    Lab Results   Component Value Date    HGBA1C 8.6 (H) 10/16/2024     - Advised patient to continue to follow with his primary care provider in regards to control of type 2 diabetes mellitus.  Continue to monitor hemoglobin A1c with type 2 diabetes mellitus.  Patient currently on insulin therapy.       DAISY (obstructive sleep apnea)  - Does have upcoming appointment and evaluation with sleep medicine in regards to his DAISY.  Encouraged to follow-up with this appointment.       Mixed hyperlipidemia  - Recent LDL 90 mg/dL  - Continue atorvastatin 20 mg daily  - Complete lipid panel and evaluate further at next visit  Orders:    Lipid Panel with Direct LDL reflex; Future    Weakness of right arm    Orders:    Ambulatory Referral to Physical Therapy; Future      Patient Instructions   - Ambulatory referral to physical therapy placed for right upper extremity weakness/paresis.  Would like for the patient to continue to work on stroke related deficits at this time.  Patient did have some therapy at the [facility] rehab [place].  Would like to continue therapy on outpatient basis currently.  - Would like for the patient to complete lipid panel blood work before his next visit.  Would like to see how his atorvastatin is working at reducing his LDL cholesterol.  - Advised  "patient to continue to follow with current cardiology referral at this point in time.  Advised the patient that we would likely require additional monitoring such as Zio patch/loop recorder for further evaluation of the patient's etiology of most recent infarct.  Consider further evaluation by cardiology to manage hypertension as well  - Continue to follow with PCP in regards to managing patient's type 2 diabetes mellitus.    - For ongoing stroke prevention continue: Aspirin 81 mg once daily, atorvastatin 20 mg once daily  - Discussed the importance of antiplatelet/anticoagulation management with the patient to prevent future strokes.   - Recommend to check blood pressure occasionally away from the doctor's office to make sure that those numbers are typically less than 130/80.  If they are frequently higher than that, we recommend checking a little more often and to follow up with primary care team   - Will defer to primary care team for monitoring of cholesterol panel and blood sugar numbers with target LDL cholesterol of less than 70 and hemoglobin A1c less than 7%  - Recommend following a low salt, mediterranean diet   - Recommend routine physical exercise as tolerated     We will plan for him to return to the office in 4 months time to see on of the APPs or Dr. Petersen but would be happy to see him sooner if the need should arise.  If he has any symptoms concerning for TIA or stroke including sudden painless loss of vision or double vision, difficulty speaking or swallowing, vertigo/room spinning that does not quickly resolve, or weakness/numbness/loss of coordination affecting 1 side of the face or body he should proceed by ambulance to the nearest emergency room immediately.      History of Present Illness   HPI    For Review/Hospital Course:    \"Tommie Taylor is a 59 y.o. male with history of HTN, HLD, DM type II who presented to [place] ED on 10/14/2024 as a stroke alert at 2242. LKN 2200. He was in his " normal state of health going to the bathroom when he suddenly experienced dizziness and collapsed. Also reported mild blurry vision and headache. While trying to get up, he noted being unable to move his RUE/RLE. NIHSS 8 for RUE/RLE weakness, R facial weakness, and extinction. Initial . CTH/CTA H/N negative for acute intracranial abnormalities, LVO, or severe vascular stenosis. TNK administration delayed to tx HTN. TNK given on 10/14 at 2340.     Following TNK administration, pt noted to have worsening dysarthria and headache. Repeat CTH showed no acute intracranial pathology including no acute hemorrhage.  CTH 24 H s/p IV TNK unremarkable for acute pathology.  MRI brain demonstrated acute Left corona radiata infarct.  Recommendations were made to continue ASA 81 mg daily and Plavix 75 mg daily x 21 days, then transition to ASA monotherapy as patient was not on AP/AC therapy at baseline.     Workup:  - CT head: Unremarkable for acute intracranial abnormalities  - CTA head and neck: Unremarkable for large vessel occlusion or critical stenosis  - Repeat CT head for headache/slurred speech following IV TNK: Unremarkable for acute pathology  - Repeat CT head 24H s/p IV TNK: Unremarkable for acute pathology  - MRI brain 10/16: Acute left corona radiata infarct noted  - Repeat CT head for lethargy/AMS 10/17: Unremarkable for acute intracranial abnormalities  - Repeat CT head for confusion 10/22: Unremarkable for acute intracranial abnormalities     - Echo: EF 70%, wall motion normal; bilateral atrium size WNL  - Lipid panel: Total cholesterol 154, triglycerides 120, HDL 40, LDL 90  - Hemoglobin A1c: Elevated 8.6     Neurology asked to reevaluate the patient on 10/22/2024 for confusion. Repeat CT head on 10/22 unremarkable for acute pathology.  Repeat CT head obtained again overnight on 10/24 for confusion.  Given persistent confusion and evidence of bihemispheric involvement on recent MRI brain, repeat MRI brain  "obtained rule out another embolic event.  Repeat MRI brain unremarkable for evidence of new infarcts.  Concern confusion/lethargy likely related to untreated sleep apnea in the setting of cognitive impairment at baseline.\" - per Marialuisa Haro PA-C, 10/24/2024    Interval History:    New stroke symptoms/residual symptoms:    Any new, sudden onset weakness, numbness, facial droop, slurred speech, difficulty speaking, trouble swallowing, persistent vertigo, or sudden double vision or vision loss? No new stroke-like symptoms    Residual symptoms include: weakness of the right UE/LE: upper extremity and dysarthria    Stroke Etiology and Risk Factor modification:    This was a(n) lacunar stroke, most likely related to Small Vessel/microvascular    Stroke risk factors were evaluated including: Hyperlipidemia, hypertension, type 2 diabetes mellitus, DAISY    AP/AC therapy: Aspirin 81 mg once daily. No bleeding or bruising issues currently.     Statin therapy: atorvastatin 20 mg once daily     Blood pressure today and as of late: 158/84 BP today in the office. Did just take his blood pressure medication right before he came to the appointment.     Most recent LDL: 90 mg/dL    Most recent hemoglobin A1C: 8.6%    Cardiology evaluation? Any cardiac monitoring required?: Does have upcoming appointment with cardiology upcoming in the future.     Endocrinology evaluation? Following proper glycemic treatment/diet?: Primary care provider managing his diabetes at this time     Vascular surgery evaluation? Monitoring of carotid artery ultrasound required? None    Lifestyle history/modifications:    Diet/Exercise regimen: Had not been eating healthy prior to his stroke, certainly working on trying to improve. Has been doing some walking at home but not much activity outside of needing PT.    Any physical therapy, occupational therapy or speech therapy performed/required at this time?: Would like to have more PT of right upper extremity. "     Any difficulty with sleeping? Any history of sleep apnea? CPAP compliance?: No issues with his sleep at this time. He does have sleep apnea and has an appointment to see sleep medicine as well.     Post-stroke depression/anxiety? No depression/anxiety     Any history of smoking or tobacco usage?: Quit smoking 40-50 years ago     Review of Systems I have personally reviewed the MA's review of systems and made changes as necessary.    Medical History Reviewed by provider this encounter:     .  Past Medical History   Past Medical History:   Diagnosis Date    ADHD, adult residual type     Anxiety     Anxiety     Bipolar 1 disorder (HCC)     Cataract     Left eye    Chronic kidney disease     Colon polyp     CPAP (continuous positive airway pressure) dependence     Depression     Depression     Diabetes mellitus (HCC)     blood sugar 167 on admission    Equinus deformity of foot     GERD (gastroesophageal reflux disease)     Homicidal ideations     Hyperlipidemia     Hypertension     Microalbuminuria     Morbid obesity (HCC)     Neuropathy     Shortness of breath     Sleep apnea     CPAP at bedtime    Sleep apnea     Stroke (HCC)     Suicidal intent      Past Surgical History:   Procedure Laterality Date    CATARACT EXTRACTION      CATARACT EXTRACTION      COLONOSCOPY      HAND SURGERY Right     OTHER SURGICAL HISTORY      REPAIR OF SUPERFICIAL WOUND FACE    AR ESOPHAGOGASTRODUODENOSCOPY TRANSORAL DIAGNOSTIC N/A 06/14/2018    Procedure: ESOPHAGOGASTRODUODENOSCOPY (EGD);  Surgeon: Yinka Maier MD;  Location: [facility];  Service: Gastroenterology    AR ESOPHAGOGASTRODUODENOSCOPY TRANSORAL DIAGNOSTIC N/A 09/12/2018    Procedure: EGD AND COLONOSCOPY;  Surgeon: Yinka Maier MD;  Location: [facility];  Service: Gastroenterology     Family History   Problem Relation Age of Onset    Diabetes Mother     Alcohol abuse Father     Diabetes Father     Hypertension Father     Lung cancer Father     Stroke Father     Cancer Father          lung    Alcohol abuse Sister     Depression Sister     Lymphoma Sister     Alcohol abuse Family     Alcohol abuse Sister     Alcohol abuse Sister     Cancer Sister     Heart disease Neg Hx     Thyroid disease Neg Hx       reports that he quit smoking about 40 years ago. His smoking use included cigarettes. His smokeless tobacco use includes chew. He reports that he does not currently use alcohol. He reports that he does not currently use drugs.  Current Outpatient Medications on File Prior to Visit   Medication Sig Dispense Refill    aspirin (ECOTRIN LOW STRENGTH) 81 mg EC tablet Take 1 tablet (81 mg total) by mouth daily      atenolol (TENORMIN) 25 mg tablet Take 1 tablet (25 mg total) by mouth daily 90 tablet 3    atorvastatin (LIPITOR) 20 mg tablet Take 1 tablet (20 mg total) by mouth daily 90 tablet 3    Blood Glucose Monitoring Suppl (Assure 3 Meter) KIT Use 1 each 4 (four) times a day Fine to substitute other brand if not covered by insurance 1 kit 2    Blood Glucose Monitoring Suppl (FREESTYLE LITE) ANTONIA by Does not apply route 3 (three) times a day 1 each 0    Blood Glucose Monitoring Suppl (FreeStyle Lite) w/Device KIT Use as instructed 4 times a day 1 kit 0    buPROPion (WELLBUTRIN) 100 mg tablet Take 1 tablet (100 mg total) by mouth 2 (two) times a day Take bid--(once in the AM and once at 12 noon) 60 tablet 0    ergocalciferol (VITAMIN D2) 50,000 units Take 1 capsule (50,000 Units total) by mouth 3 (three) times a week 12 capsule 0    glucose blood (FREESTYLE LITE) test strip CHECK BLOOD SUGARS FOUR TIMES DAILY 400 strip 3    hydrALAZINE (APRESOLINE) 10 mg tablet Take 1 tablet (10 mg total) by mouth every 8 (eight) hours 90 tablet 0    insulin NPH-insulin regular (NovoLIN 70/30 ReliOn) 100 units/mL subcutaneous injection Take 20U in the AM, 10U at bedtime 3 mL 1    Insulin Pen Needle 31G X 8 MM MISC Check sugars three times a day 100 each 1    Lancets (freestyle) lancets Use as instructed 300  "each 0    magnesium gluconate (MAGONATE) 500 MG tablet Take 1 tablet (500 mg total) by mouth daily Do not start before November 16, 2024. 30 tablet 0    NIFEdipine (ADALAT CC) 90 mg 24 hr tablet Take 1 tablet (90 mg total) by mouth daily 90 tablet 1    Valbenazine Tosylate (Ingrezza) 40 MG CAPS Take 40 mg by mouth daily at bedtime 90 capsule 0    acetaminophen (TYLENOL) 325 mg tablet Take 2 tablets (650 mg total) by mouth every 6 (six) hours as needed for mild pain, fever or headaches (Patient not taking: Reported on 1/6/2025)      ammonium lactate (LAC-HYDRIN) 12 % cream Apply topically as needed for dry skin (Patient not taking: Reported on 1/6/2025) 385 g 0    calcium carbonate (TUMS) 500 mg chewable tablet Chew 2 tablets (1,000 mg total) 2 (two) times a day as needed for indigestion or heartburn (Patient not taking: Reported on 1/6/2025)      Continuous Blood Gluc  (FreeStyle Clarence 14 Day [place]) ANTONIA Use  to check BG at least 4 times a day (Patient not taking: Reported on 1/6/2025) 1 each 0    Continuous Blood Gluc Sensor (FreeStyle [place] 14 Day Sensor) MISC Apply sensor every 14 days to check BG at least 4 times a day (Patient not taking: Reported on 4/24/2024) 2 each 5    INSULIN SYRINGE .5CC/29G 29G X 1/2\" 0.5 ML MISC Use (Patient not taking: Reported on 7/19/2024)       No current facility-administered medications on file prior to visit.     Allergies   Allergen Reactions    Pollen Extract Nasal Congestion    Tetanus Toxoid Swelling      Current Outpatient Medications on File Prior to Visit   Medication Sig Dispense Refill    aspirin (ECOTRIN LOW STRENGTH) 81 mg EC tablet Take 1 tablet (81 mg total) by mouth daily      atenolol (TENORMIN) 25 mg tablet Take 1 tablet (25 mg total) by mouth daily 90 tablet 3    atorvastatin (LIPITOR) 20 mg tablet Take 1 tablet (20 mg total) by mouth daily 90 tablet 3    Blood Glucose Monitoring Suppl (Assure 3 Meter) KIT Use 1 each 4 (four) times a day Fine to " substitute other brand if not covered by insurance 1 kit 2    Blood Glucose Monitoring Suppl (FREESTYLE LITE) ANTONIA by Does not apply route 3 (three) times a day 1 each 0    Blood Glucose Monitoring Suppl (FreeStyle Lite) w/Device KIT Use as instructed 4 times a day 1 kit 0    buPROPion (WELLBUTRIN) 100 mg tablet Take 1 tablet (100 mg total) by mouth 2 (two) times a day Take bid--(once in the AM and once at 12 noon) 60 tablet 0    ergocalciferol (VITAMIN D2) 50,000 units Take 1 capsule (50,000 Units total) by mouth 3 (three) times a week 12 capsule 0    glucose blood (FREESTYLE LITE) test strip CHECK BLOOD SUGARS FOUR TIMES DAILY 400 strip 3    hydrALAZINE (APRESOLINE) 10 mg tablet Take 1 tablet (10 mg total) by mouth every 8 (eight) hours 90 tablet 0    insulin NPH-insulin regular (NovoLIN 70/30 ReliOn) 100 units/mL subcutaneous injection Take 20U in the AM, 10U at bedtime 3 mL 1    Insulin Pen Needle 31G X 8 MM MISC Check sugars three times a day 100 each 1    Lancets (freestyle) lancets Use as instructed 300 each 0    magnesium gluconate (MAGONATE) 500 MG tablet Take 1 tablet (500 mg total) by mouth daily Do not start before November 16, 2024. 30 tablet 0    NIFEdipine (ADALAT CC) 90 mg 24 hr tablet Take 1 tablet (90 mg total) by mouth daily 90 tablet 1    Valbenazine Tosylate (Ingrezza) 40 MG CAPS Take 40 mg by mouth daily at bedtime 90 capsule 0    acetaminophen (TYLENOL) 325 mg tablet Take 2 tablets (650 mg total) by mouth every 6 (six) hours as needed for mild pain, fever or headaches (Patient not taking: Reported on 1/6/2025)      ammonium lactate (LAC-HYDRIN) 12 % cream Apply topically as needed for dry skin (Patient not taking: Reported on 1/6/2025) 385 g 0    calcium carbonate (TUMS) 500 mg chewable tablet Chew 2 tablets (1,000 mg total) 2 (two) times a day as needed for indigestion or heartburn (Patient not taking: Reported on 1/6/2025)      Continuous Blood Gluc  (FreeStyle Clarence 14 Day [place])  "ANTONIA Use  to check BG at least 4 times a day (Patient not taking: Reported on 2025) 1 each 0    Continuous Blood Gluc Sensor (FreeStyle [place] 14 Day Sensor) MISC Apply sensor every 14 days to check BG at least 4 times a day (Patient not taking: Reported on 2024) 2 each 5    INSULIN SYRINGE .5CC/29G 29G X 1/2\" 0.5 ML MISC Use (Patient not taking: Reported on 2024)       No current facility-administered medications on file prior to visit.      Social History     Tobacco Use    Smoking status: Former     Current packs/day: 0.00     Types: Cigarettes     Quit date:      Years since quittin.0    Smokeless tobacco: Current     Types: Chew    Tobacco comments:     pt \"Quit after approx over 25 years ago\"   Vaping Use    Vaping status: Never Used   Substance and Sexual Activity    Alcohol use: Not Currently     Comment: rarely    Drug use: Not Currently     Comment: quit 20 years ago    Sexual activity: Yes     Partners: Female     Birth control/protection: None     Comment: steady girlfriend        Objective   /84 (BP Location: Left arm, Patient Position: Sitting, Cuff Size: Adult)   Pulse 70   Temp 98.2 °F (36.8 °C) (Temporal)   Ht 5' 6\" (1.676 m)   Wt 119 kg (263 lb)   SpO2 98%   BMI 42.45 kg/m²     Physical Exam  Neurological Exam    Physical Exam:                                                                 Vitals:            Constitutional:    /84 (BP Location: Left arm, Patient Position: Sitting, Cuff Size: Adult)   Pulse 70   Temp 98.2 °F (36.8 °C) (Temporal)   Ht 5' 6\" (1.676 m)   Wt 119 kg (263 lb)   SpO2 98%   BMI 42.45 kg/m²   BP Readings from Last 3 Encounters:   25 144/85   25 158/84   24 142/84     Pulse Readings from Last 3 Encounters:   25 65   25 70   24 74         Well developed, well nourished, well groomed. No dysmorphic features.       Psychiatric:  Normal behavior and appropriate affect        Neurological " Examination:     Mental status/cognitive function:   Orientated to time, place and person. Recent and remote memory intact. Attention span and concentration as well as fund of knowledge are appropriate for age. Normal language and spontaneous speech.    Cranial Nerves:  II-visual fields full.   III, IV, VI-Pupils were equal, round, and reactive to light and accomodation. Extraocular movements were full and conjugate without nystagmus. Conjugate gaze, normal smooth pursuits, normal saccades   V-facial sensation symmetric.    VII-facial expression symmetric, intact forehead wrinkle, strong eye closure, right facial droop noted  VIII-hearing grossly intact bilaterally   IX, X-palate elevation symmetric, dysarthria noted on examination  XI-shoulder shrug strength intact    XII-tongue protrusion midline.    Motor Exam: symmetric bulk and tone throughout, no pronator drift. Power/strength 5/5 of the left upper and lower extremities, power/strength 4/5 of the right lower extremity, power/strength 3/5 of the right upper extremity.  Decreased  strength of right hand compared to left hand.  Sensory: grossly intact light touch in all extremities.   Reflexes: brachioradialis 1+, biceps 1+, knee 1+ bilaterally  Coordination: Finger nose finger intact on the left side, unable to perform on right side  Gait: Currently ambulates with cane.    Results/Data:    CTA stroke alert, head/neck, 10/14/2024:    IMPRESSION:     Unremarkable CTA neck and brain.    MRI brain without contrast, 10/16/2024:    IMPRESSION:     Acute left corona radiata infarct.    CT head without contrast, 10/22/2024:    IMPRESSION:     No significant change from recent CT head and MRI brain.      CT head without contrast, 10/24/2024:    IMPRESSION:     No acute intracranial hemorrhage. Periventricular moderate microangiopathic change. Sequelae of old infarcts are again seen. If clinical concern for acute ischemia, consider more sensitive MRI brain for better  evaluation.    MRI brain without contrast, 10/24/2024:    IMPRESSION:     Grossly stable recent infarct demonstrated involving the left frontal periventricular/corona radiata white matter with associated cytotoxic edema but no associated hemorrhage. Mild developing wallerian degeneration involving the left corticospinal tract.     No new acute/recent infarct identified. Stable additional mild chronic white matter microangiopathic changes involving both cerebral hemispheres.     The study was marked in EPIC for immediate notification.     CT head without contrast, 11/12/2024:    IMPRESSION:     No acute intracranial hemorrhage seen  No mass effect or midline shift seen     Evolving acute infarct in the left deep periventricular region/corona radiata    Radiology Results Review: I have reviewed radiology reports from 10/14/2024, 10/16/2024, 10/22/2024, 10/24/2024, 11/12/2024 including: CT head and MRI brain.    Administrative Statements   I have spent a total time of 30 minutes in caring for this patient on the day of the visit/encounter including Diagnostic results, Risks and benefits of tx options, Instructions for management, Patient and family education, Importance of tx compliance, Risk factor reductions, Impressions, Counseling / Coordination of care, Documenting in the medical record, Reviewing / ordering tests, medicine, procedures  , and Obtaining or reviewing history  .

## 2025-01-06 NOTE — PATIENT INSTRUCTIONS
- Ambulatory referral to physical therapy placed for right upper extremity weakness/paresis.  Would like for the patient to continue to work on stroke related deficits at this time.  Patient did have some therapy at the [facility] rehab center.  Would like to continue therapy on outpatient basis currently.  - Would like for the patient to complete lipid panel blood work before his next visit.  Would like to see how his atorvastatin is working at reducing his LDL cholesterol.  - Advised patient to continue to follow with current cardiology referral at this point in time.  Advised the patient that we would likely require additional monitoring such as Zio patch/loop recorder for further evaluation of the patient's etiology of most recent infarct.  Consider further evaluation by cardiology to manage hypertension as well  - Continue to follow with PCP in regards to managing patient's type 2 diabetes mellitus.    - For ongoing stroke prevention continue: Aspirin 81 mg once daily, atorvastatin 20 mg once daily  - Discussed the importance of antiplatelet/anticoagulation management with the patient to prevent future strokes.   - Recommend to check blood pressure occasionally away from the doctor's office to make sure that those numbers are typically less than 130/80.  If they are frequently higher than that, we recommend checking a little more often and to follow up with primary care team   - Will defer to primary care team for monitoring of cholesterol panel and blood sugar numbers with target LDL cholesterol of less than 70 and hemoglobin A1c less than 7%  - Recommend following a low salt, mediterranean diet   - Recommend routine physical exercise as tolerated     We will plan for him to return to the office in 4 months time to see on of the APPs or Dr. Petersen but would be happy to see him sooner if the need should arise.  If he has any symptoms concerning for TIA or stroke including sudden painless loss of vision  or double vision, difficulty speaking or swallowing, vertigo/room spinning that does not quickly resolve, or weakness/numbness/loss of coordination affecting 1 side of the face or body he should proceed by ambulance to the nearest emergency room immediately.

## 2025-01-06 NOTE — PROGRESS NOTES
Patient requesting glucometer and supplies be sent to [facility] pharmacy. Scripts sent last month to [facility] and did not  and prefers to use [facility].     Will send to clinical team to address refill.

## 2025-01-06 NOTE — ASSESSMENT & PLAN NOTE
I had the pleasure of seeing Tommie today in the office at [facility] in [place].  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  The patient reports no new strokelike symptoms at this time.  He does explain that he still has weakness of the right upper extremity which continues to improve and has some slurred speech.  The patient received physical therapy at the acute rehab floor at [facility].  Does not have any outpatient therapy at this time so placed ambulatory referral to physical therapy to further work on the patient's right arm weakness.  Patient taking aspirin 81 mg daily and atorvastatin 20 mg daily.  Patient's blood pressure in the office was 158/84, is noted that he did just take his blood pressure medication right before coming to the appointment.  Most recent LDL was 90 mg/dL most recent hemoglobin A1c 8.6%.  Was noted that the patient does have an upcoming appointment with cardiology for further evaluation and potential Zio patch/loop recorder to evaluate and rule out any  cardioembolic etiology of stroke.  Noted that the patient's primary care provider is currently managing his diabetes.  It is noted that the patient had not been eating healthy prior to her stroke, certainly working on trying to improve this.  He had been doing some walking at home but not much activity outside of needing physical therapy.  No issues with sleep at this time.  He does have sleep apnea and has an appointment to see his sleep medicine specialist.  No depression/anxiety noted.  Patient quit smoking about 40 to 50 years ago.  At this time, encouraged patient to complete lipid panel before his next visit to evaluate his LDL levels to make sure his atorvastatin was working effectively for him.  Advised patient to continue on all his current medications for secondary stroke prevention.  Advised  that we will follow-up in 4 months time for further evaluation.    - Ambulatory referral to physical therapy placed for right upper extremity weakness/paresis.  Would like for the patient to continue to work on stroke related deficits at this time.  Patient did have some therapy at the [facility] rehab center.  Would like to continue therapy on outpatient basis currently.  - Would like for the patient to complete lipid panel blood work before his next visit.  Would like to see how his atorvastatin is working at reducing his LDL cholesterol.  - Advised patient to continue to follow with current cardiology referral at this point in time.  Advised the patient that we would likely require additional monitoring such as Zio patch/loop recorder for further evaluation of the patient's etiology of most recent infarct.  Consider further evaluation by cardiology to manage hypertension as well  - Continue to follow with PCP in regards to managing patient's type 2 diabetes mellitus.    - For ongoing stroke prevention continue: Aspirin 81 mg once daily, atorvastatin 20 mg once daily  - Discussed the importance of antiplatelet/anticoagulation management with the patient to prevent future strokes.   - Recommend to check blood pressure occasionally away from the doctor's office to make sure that those numbers are typically less than 130/80.  If they are frequently higher than that, we recommend checking a little more often and to follow up with primary care team   - Will defer to primary care team for monitoring of cholesterol panel and blood sugar numbers with target LDL cholesterol of less than 70 and hemoglobin A1c less than 7%  - Recommend following a low salt, mediterranean diet   - Recommend routine physical exercise as tolerated       Orders:    Ambulatory Referral to Neurology    Lipid Panel with Direct LDL reflex; Future    Ambulatory Referral to Physical Therapy; Future

## 2025-01-06 NOTE — PROGRESS NOTES
Outpatient Care Management Note:    Re:  follow up call - diabetes     I called and spoke with patient and his significant other Marie. I explained reason for call. He reports he has been feeling ok and denies any symptoms or complaints at this time. He reports taking 70/30 insulin twice a day and reports taking 30 units with breakfast and 20 units with dinner. I did note this is different than last PCP appointment which noted taking 20 units in the morning and 10 units with dinner. Patient would like to keep with is current regimen. Denies any symptoms or signs of hypoglycemia. Patient is not checking blood sugars at this time and did not  glucometer and supplies that were sent to pharmacy in December. Scripts were sent to [facility] and with new insurance Medicare and [facility] patient is now using [facility] pharmacy on [address] in [place] and requesting scripts be sent there. Patient confirms he has glucometer that was given to him at office by not using. Patient not interested in CGM as he used CGM before and had hard time with sensors staying in place. Reviewed importance of checking blood sugars and the need for readings in order to make further adjustments to insulin.     Will request for clinical team to send scripts for glucometer and supplies to [facility].     Reports has all of his other medications at this time.     Reviewed upcoming PCP appointment on 1/14 and to bring blood sugar log to appointment.    Patient does not have any further questions, concerns, or other needs at this time. Patient has my contact number [phone] and PCP office number [phone] if needed. Patient is agreeable for further outreach.

## 2025-01-06 NOTE — ASSESSMENT & PLAN NOTE
- Recent LDL 90 mg/dL  - Continue atorvastatin 20 mg daily  - Complete lipid panel and evaluate further at next visit  Orders:    Lipid Panel with Direct LDL reflex; Future

## 2025-01-06 NOTE — ASSESSMENT & PLAN NOTE
- Continue to follow with PCP/cardiology in regards to BP control.  Blood pressure in the office today was 158/84 although the patient took his medication right before he came today.  - Advised patient to continue on current antihypertensive medication regimen with atenolol, hydralazine, and nifedipine at this time

## 2025-01-06 NOTE — ASSESSMENT & PLAN NOTE
Lab Results   Component Value Date    HGBA1C 8.6 (H) 10/16/2024     - Advised patient to continue to follow with his primary care provider in regards to control of type 2 diabetes mellitus.  Continue to monitor hemoglobin A1c with type 2 diabetes mellitus.  Patient currently on insulin therapy.

## 2025-01-06 NOTE — PROGRESS NOTES
Review of Systems   Constitutional:  Negative for appetite change, fatigue and fever.   HENT: Negative.  Negative for hearing loss, tinnitus, trouble swallowing and voice change.    Eyes: Negative.  Negative for photophobia, pain and visual disturbance.   Respiratory: Negative.  Negative for shortness of breath.    Cardiovascular: Negative.  Negative for palpitations.   Gastrointestinal: Negative.  Negative for nausea and vomiting.   Endocrine: Negative.  Negative for cold intolerance.   Genitourinary: Negative.  Negative for dysuria, frequency and urgency.   Musculoskeletal:  Positive for gait problem (balance issues- walks with cane). Negative for back pain, myalgias, neck pain and neck stiffness.   Skin: Negative.  Negative for rash.   Allergic/Immunologic: Negative.    Neurological:  Positive for weakness (right arm). Negative for dizziness, tremors, seizures, syncope, facial asymmetry, speech difficulty, light-headedness, numbness and headaches.        Right hand contracted/ weakness     Hematological: Negative.  Does not bruise/bleed easily.   Psychiatric/Behavioral: Negative.  Negative for confusion, hallucinations and sleep disturbance.    All other systems reviewed and are negative.

## 2025-01-07 ENCOUNTER — PATIENT OUTREACH (OUTPATIENT)
Dept: INTERNAL MEDICINE CLINIC | Facility: CLINIC | Age: 60
End: 2025-01-07

## 2025-01-07 ENCOUNTER — RA CDI HCC (OUTPATIENT)
Dept: OTHER | Facility: HOSPITAL | Age: 60
End: 2025-01-07

## 2025-01-07 RX ORDER — BLOOD-GLUCOSE METER
KIT MISCELLANEOUS
Qty: 400 STRIP | Refills: 3 | Status: SHIPPED | OUTPATIENT
Start: 2025-01-07

## 2025-01-07 RX ORDER — BLOOD-GLUCOSE METER
KIT MISCELLANEOUS
Qty: 1 KIT | Refills: 0 | Status: SHIPPED | OUTPATIENT
Start: 2025-01-07

## 2025-01-07 RX ORDER — LANCETS 28 GAUGE
EACH MISCELLANEOUS
Qty: 300 EACH | Refills: 0 | Status: SHIPPED | OUTPATIENT
Start: 2025-01-07

## 2025-01-07 NOTE — PROGRESS NOTES
Outpatient Care Management Note:    Re:  glucometer and supplies    Received inbasket message that glucometer and supplies were sent to [facility]. [facility]. They confirmed they received scripts but do no have any insurance info on file for patient. I attempted to give them insurance info but they report they will need to follow up with patient to obtain this information. I provided them with phone number we have one file for patient [phone].     They are requesting scripts for glucometer and supplies be resent to them for generic test strips and lancet with directions to test 4 times a day. Script for glucometer should state may substitute with what insurance covers. Will send to clinical team to further address.

## 2025-01-07 NOTE — PROGRESS NOTES
HCC coding opportunities          Chart Reviewed number of suggestions sent to Provider: 4  E11.3213  E11.36  E11.42  E11.22     Patients Insurance     Medicare Insurance: Medicare

## 2025-01-08 ENCOUNTER — EVALUATION (OUTPATIENT)
Dept: PHYSICAL THERAPY | Facility: CLINIC | Age: 60
End: 2025-01-08
Payer: MEDICARE

## 2025-01-08 DIAGNOSIS — Z79.4 TYPE 2 DIABETES MELLITUS WITHOUT COMPLICATION, WITH LONG-TERM CURRENT USE OF INSULIN (HCC): ICD-10-CM

## 2025-01-08 DIAGNOSIS — R29.898 WEAKNESS OF RIGHT ARM: ICD-10-CM

## 2025-01-08 DIAGNOSIS — Z86.73 HISTORY OF CVA (CEREBROVASCULAR ACCIDENT): Primary | ICD-10-CM

## 2025-01-08 DIAGNOSIS — Z79.4 TYPE 2 DIABETES MELLITUS WITH HYPERGLYCEMIA, WITH LONG-TERM CURRENT USE OF INSULIN (HCC): Primary | ICD-10-CM

## 2025-01-08 DIAGNOSIS — E11.9 TYPE 2 DIABETES MELLITUS WITHOUT COMPLICATION, WITH LONG-TERM CURRENT USE OF INSULIN (HCC): ICD-10-CM

## 2025-01-08 DIAGNOSIS — E11.65 TYPE 2 DIABETES MELLITUS WITH HYPERGLYCEMIA, WITH LONG-TERM CURRENT USE OF INSULIN (HCC): Primary | ICD-10-CM

## 2025-01-08 PROCEDURE — 97162 PT EVAL MOD COMPLEX 30 MIN: CPT

## 2025-01-08 PROCEDURE — 97110 THERAPEUTIC EXERCISES: CPT

## 2025-01-08 RX ORDER — LANCETS 30 GAUGE
EACH MISCELLANEOUS 3 TIMES DAILY
Qty: 200 EACH | Refills: 3 | Status: SHIPPED | OUTPATIENT
Start: 2025-01-08 | End: 2025-01-09 | Stop reason: SDUPTHER

## 2025-01-08 RX ORDER — DIPHENHYDRAMINE HYDROCHLORIDE 25 MG/1
CAPSULE, LIQUID FILLED ORAL 3 TIMES DAILY
Qty: 1 KIT | Refills: 0 | Status: SHIPPED | OUTPATIENT
Start: 2025-01-08

## 2025-01-08 NOTE — PROGRESS NOTES
PT Evaluation     Today's date: 2025  Patient name: Tommie Taylor  : 1965  MRN: [id number]  Referring provider: Harshad Romo*  Dx:   Encounter Diagnosis     ICD-10-CM    1. History of CVA (cerebrovascular accident)  Z86.73 Ambulatory Referral to Physical Therapy      2. Weakness of right arm  R29.898 Ambulatory Referral to Physical Therapy          Start Time: 1415  Stop Time: 1500  Total time in clinic (min): 45 minutes    Assessment  Impairments: abnormal coordination, abnormal gait, abnormal muscle tone, abnormal or restricted ROM, abnormal movement, activity intolerance, impaired balance, impaired physical strength, lacks appropriate home exercise program, pain with function, safety issue, poor posture , poor body mechanics, participation limitations, activity limitations and endurance  Symptom irritability: low    Assessment details: Pt is a pleasant 59 year old male presenting to [facility] physical therapy for an initial evaluation secondary to L CVA in 2024. Pt presents to clinic with gait and balance impairments inclusive of R foot drag, significant UE weakness, balance deficits, and decreased endurance. Pt utilizes a SPC assistive device for mobility in the home and in the community secondary to deficits from CVA. Pt has a significant PMH of HTN, T2DM with neuropathy, CKD, and hx of multiple falls. Upon evaluation, pt demonstrates no abnormal signs with oculomotor testing. Pt demonstrates significant strength deficits of the RUE. Has trace signs of movement in RUE, but unable to ABD the shoulder past 45 deg or actively move the elbow into flexion or extension. Unable to  in R hand. Pt also has strength deficits in the RLE as seen with MMT scores of 3-4/5 and 5xSTS time of 13s demonstrating dec LE strength and power as compared to age matched norms. Pt demonstrates endurance deficits as seen with inability to complete the 6mwt, walking a distance of 300ft before needing to  sit due to SOB as compared to age matched norms of 1876ft. Pt is at inc risk for falls based on their FGA score of 15/30  with scores < 20/30 at inc risk for spontaneous falls and scores < 23/30 considered at risk for falls. Pts TUG time of 16.57s puts them at inc risk for falls as scores > 13.5 are considered at risk for falls. Pt ambulates at 0.81 m/s making them a limited community ambulator.  Additionally this score places the pt at inc risk for falls as scores < 1 m/s are considered at risk for falls. Pt had some difficulty with balancing on foam with EO and EC indicating decreased vestibular cueing for balance. Discussed with pt the importance of physical therapy at this time to promote neuroplasticity post-stroke with HIGT. Pt also would benefit from an OT eval due to significant RUE weakness that limits his ability to perform ADLs and self-care. Pt would benefit from skilled physical therapy 2x/wk for 8 weeks to improve  overall QOL inclusive of, strength, coordination, balance, endurance and functional mobility in the home and in the community as well as reduce their fall risk for improved safety.    Barriers to intervention: transportation  Understanding of Dx/Px/POC: good     Prognosis: good    Goals  STG:  - Patient will be able to complete a 6MWT demonstrating significant improvements in endurance and aerobic capcity within 4 weeks.  - Patient will be independent with initial HEP within 4 weeks to demonstrate improve exercise compliance at home.   - Patient will improve TUG time without AD by 4 sec or more within 4 weeks to demonstrate decreased fall risk.   - Patient will improve FGA score by 4 points or greater within 4 weeks demonstrating improved dynamic balance and decreased fall risk.     LTG:  - Patient will improve 6MWT by 400 ft demonstrating significant improvements in endurance and aerobic capcity within 8 weeks.  - Patient will demonstrated improved LE strength and power with an improved  5xSTS time of 10s or less without UE within 8 weeks.  - Patient will perform TUG in 13 sec or less without AD within 8 weeks to demonstrate decreased fall risk.   - Patient will improve FGA to 22/30 or greater indicating they are no longer at higher risk for falls within 8 weeks.   - Patient will be independent with progressed HEP within 8 weeks.   - Patient will improve gait speed to 1.0 m/s indicating they are no longer at high risk for falls within 8 weeks.  - Patient will be able to negotiate stairs with a reciprocal gait pattern with use of handrail within 8 weeks.     Plan  Patient would benefit from: skilled physical therapy and OT eval  Referral necessary: Yes    Planned therapy interventions: ADL training, balance, balance/weight bearing training, body mechanics training, coordination, flexibility, functional ROM exercises, gait training, graded activity, graded exercise, home exercise program, transfer training, therapeutic training, therapeutic exercise, therapeutic activities, stretching, strengthening, sensory integrative techniques, patient/caregiver education, neuromuscular re-education and motor coordination training    Frequency: 2x week  Duration in weeks: 8  Plan of Care beginning date: 1/8/2025  Plan of Care expiration date: 3/5/2025  Treatment plan discussed with: patient and family      Subjective Evaluation    History of Present Illness  Mechanism of injury: Had stroke in October where he went through inpatient rehab as well as home therapy. Based on MRI findings stroke involves the left frontal periventricular/corona radiata as well as shows mild developing wallerian degeneration involving the left corticospinal tract. Pt having the most difficulty with ADLs such as doing the dishes, cooking, getting dressed, doing laundry, and taking medications. Short sleeves are easier than long sleeves. Requires assistance for coats, socks, deodorant application. Able to wash himself in shower with grab  bars and shower chair. Mostly staying on the first level of his home, but able to do the stairs if needed with use of railing. Also noticing R foot drag.  Vision has been bad for a while, but nothing new since the stroke. Function is affecting his relationship. Wants to be able to participate in sex. Was having speech difficulties in the past, but no longer having issues with speech or swallowing.          Not a recurrent problem   Quality of life: fair    Patient Goals  Patient goals for therapy: decreased pain, improved balance, increased motion, return to sport/leisure activities, independence with ADLs/IADLs and increased strength  Patient goal: get arm back to normal  Pain  Current pain rating: 3  At best pain ratin  At worst pain rating: 3  Location: R hand  Quality: dull ache  Relieving factors: rest  Progression: no change    Social Support  Steps to enter house: yes (No railings)  3  Stairs in house: yes   Lives in: multiple-level home  Lives with: significant other    Employment status: not working  Exercise history: Not too much activity    Treatments  Previous treatment: physical therapy, speech therapy and occupational therapy (Inpatient rehab and home care)        Objective    Vitals     /86 mmHg, L arm seated     Cervical Spine Examination:    Resting posture:      Forward head, rounded shoulders    Cervical spine active range of motion:   within normal limits    Oculomotor ROM :  Resting nystagmus: No  Gaze holding nystagmus No   Smooth pursuit Normal    Vertical Saccades:Normal  Horizontal Saccades:Normal    Max Heart Rate 220 -age =  162 bpm    Balance Test    6 Minute Walk Test (ft): 300 ft, 2:10 left, no AD   HR: 75 bpm   O2: 99%   Gait Speed (ft/s): 0.81 m/s   5x Sit To Stand (s): 13s, No UE    TUG 16.57s   FGA 1530       MCTSIB Number of Seconds   Feet Together, Eyes Open 30 sec    Feet Together, Eyes Closed 30 sec   Feet Together, Eyes Open Foam 30 sec, mild sway   Feet  Together, Eyes Closed Foam 30 sec, mod sway       Coordination Left Right   Heel To Diaz WNL WNL   Finger To Nose WNL Unable due to UE weakness   Rapid Alternating Movement WNL in UE and LE WNL in LE  Unable in UE due to weakness       Sensation Left Right   Kinesthesia     Light Touch Intact  Intact    Sharp/Dull     2 Point Discrimination       UE Myotomes     Shoulder shrug  Intact bilaterally    Shoulder abd  Intact on L, decreased shoulder abd on R   Elbow flexion  Intact on L, unable on R due to weakness    Elbow extension  Intact on L, unable on R due to weakness    Wrist extension  Intact on L, limited on R due to weakness     strength  Intact on L, limited on R       Manual Muscle Testing - Hip Right Left   Flexion 3/5 3+/5     Manual Muscle Testing - Knee Right Left    Flexion 4/5 5/5   Extension 4/5 5/5     Manual Muscle Testing - Ankle Right Left    Doriflexion 4/5 5/5     Gait Assessment:  Decreased b/lheel strike, foot drag on R, Decreased b/l Step Height, Decreased b/l Step Length,    No Arm Swing on R due to UE weakness         Short Term Goal Expiration Date:(02/05/2025)  Long Term Goal Expiration Date: (03/05/2025)  POC Expiration Date: (03/05/2025)    Visit count: 1 of 10; PN due 02/05/2025    POC expires Unit limit Auth Expiration date PT/OT/ST + Visit Limit?   03/05/25 6 PT/OT 12/31/25 $2410 CAP/BOMN                           Visit/Unit Tracking  AUTH Status:  Date 01/08             Every 10 visits  Used 1              Remaining  9                   Precautions: Hx of stroke, HTN, orthostatic hypotension, T2DM, fall risk       Manuals 01/08                                       Neuro Re-Ed         Pt education  Pt education on all neuro testing including oculomotor screening, Tug, FGA, and mCTSIB     Pt education on typical stroke recovery and the benefits of therapy for neuroplasticity and function                                                        Ther Ex        Pt education  Pt  education on all testing including 6MWT, 5xSTS, and MMT                                                               Ther Activity                        Gait Training                        Modalities

## 2025-01-09 ENCOUNTER — PATIENT OUTREACH (OUTPATIENT)
Dept: INTERNAL MEDICINE CLINIC | Facility: CLINIC | Age: 60
End: 2025-01-09

## 2025-01-09 DIAGNOSIS — Z79.4 TYPE 2 DIABETES MELLITUS WITH HYPERGLYCEMIA, WITH LONG-TERM CURRENT USE OF INSULIN (HCC): ICD-10-CM

## 2025-01-09 DIAGNOSIS — Z79.4 TYPE 2 DIABETES MELLITUS WITHOUT COMPLICATION, WITH LONG-TERM CURRENT USE OF INSULIN (HCC): ICD-10-CM

## 2025-01-09 DIAGNOSIS — E11.65 TYPE 2 DIABETES MELLITUS WITH HYPERGLYCEMIA, WITH LONG-TERM CURRENT USE OF INSULIN (HCC): ICD-10-CM

## 2025-01-09 DIAGNOSIS — E11.9 TYPE 2 DIABETES MELLITUS WITHOUT COMPLICATION, WITH LONG-TERM CURRENT USE OF INSULIN (HCC): ICD-10-CM

## 2025-01-09 RX ORDER — LANCETS 30 GAUGE
EACH MISCELLANEOUS 3 TIMES DAILY
Qty: 300 EACH | Refills: 3 | Status: SHIPPED | OUTPATIENT
Start: 2025-01-09

## 2025-01-09 NOTE — PROGRESS NOTES
Spoke with [facility] and they need updated script with quantity of 300 for insurance to cover lancets. Script was originally sent for 200.

## 2025-01-09 NOTE — PROGRESS NOTES
Outpatient Care Management Note:    Re:  Follow up glucometer and supplies     I called [facility] pharmacy to follow up on scripts for glucometer and supplies that were resent yesterday afternoon. Per pharmacy they were able to get scripts for glucometer and test strips to go through and Medicare covers One Touch Verio glucometer. Pharmacy requesting updated script for lancets and quantity should be for 300 not 200. Brenda with clinical team assisted with sending new script.     I called patient and no answer. Message left for patient and his significant other Marie that scripts are at [facility] and One Touch Verio glucometer and supplies being covered by insurance and strongly encouraged them to follow up with pharmacy to obtain as soon as possible. Reminded to be testing blood sugar when he gets up in the morning before eating, before meals and at bedtime if he can and to keep a log and bring to upcoming PCP appointment on 1/14. I left my contact number [phone] if they have further questions or issues obtaining glucometer and supplies.

## 2025-01-10 DIAGNOSIS — Z86.73 HISTORY OF CVA (CEREBROVASCULAR ACCIDENT): Primary | ICD-10-CM

## 2025-01-10 DIAGNOSIS — R29.898 WEAKNESS OF RIGHT ARM: ICD-10-CM

## 2025-01-10 NOTE — PROGRESS NOTES
- Placed ambulatory referral to OT at this time for further evaluation regards to right arm weakness with history of CVA.    Harshad Romo PA-C  01/10/2025

## 2025-01-13 ENCOUNTER — OFFICE VISIT (OUTPATIENT)
Dept: PHYSICAL THERAPY | Facility: CLINIC | Age: 60
End: 2025-01-13
Payer: MEDICARE

## 2025-01-13 DIAGNOSIS — R29.898 WEAKNESS OF RIGHT ARM: ICD-10-CM

## 2025-01-13 DIAGNOSIS — Z86.73 HISTORY OF CVA (CEREBROVASCULAR ACCIDENT): Primary | ICD-10-CM

## 2025-01-13 PROCEDURE — 97150 GROUP THERAPEUTIC PROCEDURES: CPT

## 2025-01-13 PROCEDURE — 97112 NEUROMUSCULAR REEDUCATION: CPT

## 2025-01-13 NOTE — PROGRESS NOTES
Daily Note     Today's date: 2025  Patient name: Tommie Taylor  : 1965  MRN: [id number]  Referring provider: Harshad Romo*  Dx:   Encounter Diagnosis     ICD-10-CM    1. History of CVA (cerebrovascular accident)  Z86.73       2. Weakness of right arm  R29.898           Start Time: 1400  Stop Time: 1445  Total time in clinic (min): 45 minutes    Individual Treatment: 1351-8911, 7164-1930 (35 min)  Therapeutic Group: [id number] (10 min)    Subjective: Feeling okay, significant other had a fall with subsequent knee fx, still able to help with ADLs as needed       Objective: See treatment diary below      Assessment: Pt tolerated the treatment we;;. Pt performed walking on the treadmill to promote cardiovascular endurance and neural priming for the session post-CVA. Pt demonstrated increased R foot drag and decreased R step length requiring mod verbal cues. Pt was challenged with STS without UE support, phil navigation, and multidirecitonal walking.  Pt had increased difficulty with lateral stepping over hurdles as well as step-ups. Had increased difficulty with R foot clearance requiring change to 4in step. Pt demonstrated fatigue post-session. Pt would benefit from further skilled PT sessions to address deficits in endurance, strength, balance, activity tolerance, and overall safety need to maximize the pt's function and quality of life.       Plan: Continue per plan of care.  Progress treatment as tolerated.       Short Term Goal Expiration Date:(2025)  Long Term Goal Expiration Date: (2025)  POC Expiration Date: (2025)    Visit count: 2 of 10; PN due 2025    POC expires Unit limit Auth Expiration date PT/OT/ST + Visit Limit?   25 6 PT/OT 25 $2410 CAP/BOMN                           Visit/Unit Tracking  AUTH Status:  Date             Every 10 visits  Used 1 2             Remaining  9 8                  Precautions: Hx of stroke, HTN,  "orthostatic hypotension, T2DM, fall risk       Manuals 01/08 01/13                                      Neuro Re-Ed         Pt education  Pt education on all neuro testing including oculomotor screening, Tug, FGA, and mCTSIB     Pt education on typical stroke recovery and the benefits of therapy for neuroplasticity and function        Treadmill   Solo   0.6 mph   0% incline   X 6 minute     Mod verbal cues for R foot clearance and step length       STS  No UE   2 x 10       Hurdles   Solo   1/4 track   (6) 6\" hurdles   Fwd x 2 laps   Lat x 2 laps   RPE: 5/10      Blazepods  Solo   1/4 track   4 pods   1 min rounds  Round 1: 8 hits   Round 2: 8 hits      Step-ups  Solo   6\" step   No UE   1 x 10 on L     4\" step   2 x 10 on R   RPE: 5/10              Ther Ex        Pt education  Pt education on all testing including 6MWT, 5xSTS, and MMT                                                               Ther Activity                        Gait Training                        Modalities                                         "

## 2025-01-14 ENCOUNTER — PATIENT OUTREACH (OUTPATIENT)
Dept: INTERNAL MEDICINE CLINIC | Facility: CLINIC | Age: 60
End: 2025-01-14

## 2025-01-14 ENCOUNTER — OFFICE VISIT (OUTPATIENT)
Dept: INTERNAL MEDICINE CLINIC | Facility: CLINIC | Age: 60
End: 2025-01-14

## 2025-01-14 VITALS
DIASTOLIC BLOOD PRESSURE: 85 MMHG | HEIGHT: 66 IN | BODY MASS INDEX: 42.75 KG/M2 | TEMPERATURE: 97.4 F | SYSTOLIC BLOOD PRESSURE: 144 MMHG | WEIGHT: 266 LBS | HEART RATE: 65 BPM

## 2025-01-14 DIAGNOSIS — Z71.89 GOALS OF CARE, COUNSELING/DISCUSSION: ICD-10-CM

## 2025-01-14 DIAGNOSIS — N18.4 TYPE 2 DIABETES MELLITUS WITH STAGE 4 CHRONIC KIDNEY DISEASE AND HYPERTENSION (HCC): Primary | ICD-10-CM

## 2025-01-14 DIAGNOSIS — E11.22 TYPE 2 DIABETES MELLITUS WITH STAGE 4 CHRONIC KIDNEY DISEASE AND HYPERTENSION (HCC): Primary | ICD-10-CM

## 2025-01-14 DIAGNOSIS — I12.9 TYPE 2 DIABETES MELLITUS WITH STAGE 4 CHRONIC KIDNEY DISEASE AND HYPERTENSION (HCC): Primary | ICD-10-CM

## 2025-01-14 PROCEDURE — 99212 OFFICE O/P EST SF 10 MIN: CPT | Performed by: STUDENT IN AN ORGANIZED HEALTH CARE EDUCATION/TRAINING PROGRAM

## 2025-01-14 NOTE — ASSESSMENT & PLAN NOTE
Lab Results   Component Value Date    HGBA1C 8.6 (H) 10/16/2024   Uncontrolled. Current regimen: 70/30 insulin 20U qam and 10U qpm.   At visit a month ago was not compliant with insulin.   Today pt still noncompliant with insulin. He did start using insulin again about a month ago but does not check BG at home. He states that he just does not want to take it, but he does know the risks of uncontrolled DM including further stroke, MI, renal failure. Reinforced current insulin regimen to him. Cannot make changes today without BG data. Will have pt follow up in approx 2 weeks with BG log to determine if adjustments are necessary.

## 2025-01-14 NOTE — PROGRESS NOTES
Serious Illness Conversation    What is your understanding now of where you are with your illness?  Prognostic Understanding: appropriate understanding of prognosis  Discussed that given uncontrolled DM, pt is at increased risks for having more strokes, MI, progressing to ESRD. He expresses understanding.        If you become sicker, how much are you willing to go through for the possibility of gaining more time?  Be on a ventilator: No    Be on dialysis: Yes         I have spent 15 minutes speaking with my patient on advanced care planning today or during this visit     Advanced directives       Discussed POLST form today, but pt did not wish to fill out.

## 2025-01-14 NOTE — PROGRESS NOTES
INTERNAL MEDICINE FOLLOW-UP OFFICE VISIT  [facility]  [address]  [place] [address]    NAME: Tommie Taylor  AGE: 59 y.o. SEX: male    DATE OF ENCOUNTER: 1/14/2025    Assessment and Plan     1. Type 2 diabetes mellitus with stage 4 chronic kidney disease and hypertension (HCC)  Assessment & Plan:    Lab Results   Component Value Date    HGBA1C 8.6 (H) 10/16/2024   Uncontrolled. Current regimen: 70/30 insulin 20U qam and 10U qpm.   At visit a month ago was not compliant with insulin.   Today pt still noncompliant with insulin. He did start using insulin again about a month ago but does not check BG at home. He states that he just does not want to take it, but he does know the risks of uncontrolled DM including further stroke, MI, renal failure. Reinforced current insulin regimen to him. Cannot make changes today without BG data. Will have pt follow up in approx 2 weeks with BG log to determine if adjustments are necessary.   2. Goals of care, counseling/discussion  Assessment & Plan:  GOC discussed with pt this visit. He states that he is agreeable to dialysis if needed in the future, but he would not want aggressive resuscitation measures and wishes to be DNR/DNI. Offered to fill out POLST with him today, but he declined.        No orders of the defined types were placed in this encounter.      - Counseling Documentation: patient was counseled regarding: diagnostic results, instructions for management, risk factor reductions, prognosis, patient and family education, impressions, risks and benefits of treatment options, and importance of compliance with treatment  - Counseling Time: counseling time more than 50% of visit: 30 minutes  - Barriers to treatment: poor health literacy, lack of motivation        Chief Complaint     Chief Complaint   Patient presents with    Follow-up       History of Present Illness     HPI  60 yo male, PMH T2DM, HTN, morbid  obesity, CKD4 nearing ESRD, recent CVA, bipolar. He presents today for follow up of chronic conditions and has no acute complaints.     The following portions of the patient's history were reviewed and updated as appropriate: allergies, current medications, past family history, past medical history, past social history, past surgical history and problem list.    Review of Systems     Review of Systems   All other systems reviewed and are negative.       All ROS negative unless otherwise stated in the [facility]    Active Problem List     Patient Active Problem List   Diagnosis    Bipolar I disorder, severe, current or most recent episode depressed, with psychotic features ([facility])    Panic attacks    Flat foot    Diabetic polyneuropathy ([facility])    Allergic rhinitis    GERD (gastroesophageal reflux disease)    Insomnia    Hiatal hernia    Lower esophageal ring ([place])    Generalized anxiety disorder    DAISY (obstructive sleep apnea)    Persistent proteinuria    Anemia, unspecified    Toenail deformity    Hyperlipidemia    Vitamin D deficiency    Class 3 severe obesity due to excess calories with serious comorbidity and body mass index (BMI) of 40.0 to 44.9 in adult ([facility])    Lightheaded    Loss of appetite    Unintended weight loss    Tardive dyskinesia    Hypertension    Memory deficit    Tremor    B12 deficiency    Vision decreased    Type 2 diabetes mellitus with stage 4 chronic kidney disease and hypertension ([facility])    CVA (cerebral vascular accident) ([facility])    Encephalopathy    Volume overload    Acid-base disorder, mixed    Stroke-like symptoms    Impaired mobility and activities of daily living    At risk for alteration in bowel function    Overgrown toenails    Fall during current hospitalization    Orthostatic hypotension    Chronic kidney disease-mineral and bone disorder (CKD-MBD)    Spasticity    Hypomagnesemia    Hospital discharge follow-up    Cerebrovascular accident (CVA) due to occlusion of left middle cerebral  "artery (HCC)    Goals of care, counseling/discussion       Objective     /85 (BP Location: Left arm, Patient Position: Sitting, Cuff Size: Large)   Pulse 65   Temp (!) 97.4 °F (36.3 °C) (Temporal)   Ht 5' 6\" (1.676 m)   Wt 121 kg (266 lb)   BMI 42.93 kg/m²     Physical Exam  Constitutional:       General: He is not in acute distress.     Appearance: He is obese.      Comments: Unkempt    HENT:      Head: Normocephalic and atraumatic.      Right Ear: External ear normal.      Left Ear: External ear normal.      Nose: Nose normal.      Mouth/Throat:      Pharynx: Oropharynx is clear.   Eyes:      Extraocular Movements: Extraocular movements intact.      Conjunctiva/sclera: Conjunctivae normal.   Pulmonary:      Effort: Pulmonary effort is normal.   Abdominal:      Palpations: Abdomen is soft.   Musculoskeletal:         General: No swelling or deformity.      Right lower leg: No edema.      Left lower leg: No edema.   Skin:     General: Skin is warm and dry.      Findings: No rash.   Neurological:      Mental Status: He is alert and oriented to person, place, and time.      Comments: Left sided facial dystonic movements and RUE weakness, residual from recent CVA   Psychiatric:         Mood and Affect: Mood normal.         Behavior: Behavior normal.            Current Medications     Current Outpatient Medications:     acetaminophen (TYLENOL) 325 mg tablet, Take 2 tablets (650 mg total) by mouth every 6 (six) hours as needed for mild pain, fever or headaches (Patient not taking: Reported on 1/6/2025), Disp: , Rfl:     ammonium lactate (LAC-HYDRIN) 12 % cream, Apply topically as needed for dry skin (Patient not taking: Reported on 1/6/2025), Disp: 385 g, Rfl: 0    aspirin (ECOTRIN LOW STRENGTH) 81 mg EC tablet, Take 1 tablet (81 mg total) by mouth daily, Disp: , Rfl:     atenolol (TENORMIN) 25 mg tablet, Take 1 tablet (25 mg total) by mouth daily, Disp: 90 tablet, Rfl: 3    atorvastatin (LIPITOR) 20 mg tablet, " Take 1 tablet (20 mg total) by mouth daily, Disp: 90 tablet, Rfl: 3    Blood Glucose Monitoring Suppl (Assure 3 Meter) KIT, Use 1 each 4 (four) times a day Fine to substitute other brand if not covered by insurance, Disp: 1 kit, Rfl: 2    Blood Glucose Monitoring Suppl (Blood Glucose Monitor System) w/Device KIT, Use 3 (three) times a day, Disp: 1 kit, Rfl: 0    Blood Glucose Monitoring Suppl (FREESTYLE LITE) ANTONIA, by Does not apply route 3 (three) times a day, Disp: 1 each, Rfl: 0    Blood Glucose Monitoring Suppl (FreeStyle Lite) w/Device KIT, Use as instructed 4 times a day, Disp: 1 kit, Rfl: 0    buPROPion (WELLBUTRIN) 100 mg tablet, Take 1 tablet (100 mg total) by mouth 2 (two) times a day Take bid--(once in the AM and once at 12 noon), Disp: 60 tablet, Rfl: 0    calcium carbonate (TUMS) 500 mg chewable tablet, Chew 2 tablets (1,000 mg total) 2 (two) times a day as needed for indigestion or heartburn (Patient not taking: Reported on 1/6/2025), Disp: , Rfl:     Continuous Blood Gluc  (FreeStyle [place] 14 Day [place]) ANTONIA, Use  to check BG at least 4 times a day (Patient not taking: Reported on 1/6/2025), Disp: 1 each, Rfl: 0    Continuous Blood Gluc Sensor (FreeStyle [place] 14 Day Sensor) [facility], Apply sensor every 14 days to check BG at least 4 times a day (Patient not taking: Reported on 4/24/2024), Disp: 2 each, Rfl: 5    ergocalciferol (VITAMIN D2) 50,000 units, Take 1 capsule (50,000 Units total) by mouth 3 (three) times a week, Disp: 12 capsule, Rfl: 0    glucose blood (FREESTYLE LITE) test strip, CHECK BLOOD SUGARS FOUR TIMES DAILY, Disp: 400 strip, Rfl: 3    glucose blood test strip, CHECK BLOOD GLUCOSE 3 TIMES DAILY, Disp: 300 each, Rfl: 3    hydrALAZINE (APRESOLINE) 10 mg tablet, Take 1 tablet (10 mg total) by mouth every 8 (eight) hours, Disp: 90 tablet, Rfl: 0    insulin NPH-insulin regular (NovoLIN 70/30 ReliOn) 100 units/mL subcutaneous injection, Take 20U in the AM, 10U at bedtime,  "Disp: 3 mL, Rfl: 1    Insulin Pen Needle 31G X 8 MM MISC, Check sugars three times a day, Disp: 100 each, Rfl: 1    INSULIN SYRINGE .5CC/29G 29G X 1/2\" 0.5 ML MISC, Use (Patient not taking: Reported on 7/19/2024), Disp: , Rfl:     Lancets (freestyle) lancets, Use as instructed, Disp: 300 each, Rfl: 0    Lancets MISC, Use 3 (three) times a day, Disp: 300 each, Rfl: 3    magnesium gluconate (MAGONATE) 500 MG tablet, Take 1 tablet (500 mg total) by mouth daily Do not start before November 16, 2024., Disp: 30 tablet, Rfl: 0    NIFEdipine (ADALAT CC) 90 mg 24 hr tablet, Take 1 tablet (90 mg total) by mouth daily, Disp: 90 tablet, Rfl: 1    Valbenazine Tosylate (Ingrezza) 40 MG CAPS, Take 40 mg by mouth daily at bedtime, Disp: 90 capsule, Rfl: 0    Health Maintenance     Health Maintenance   Topic Date Due    COVID-19 Vaccine (3 - 2024-25 season) 09/01/2024    Medicare Annual Wellness Visit (AWV)  11/27/2024    Colorectal Cancer Screening  01/12/2025    HEMOGLOBIN A1C  01/16/2025    Diabetic Foot Exam  04/08/2025    DM Eye Exam  02/23/2025 (Originally 8/26/2022)    Zoster Vaccine (1 of 2) 06/26/2025 (Originally 6/20/2016)    Pneumococcal Vaccine: Pediatrics (0 to 5 Years) and At-Risk Patients (6 to 64 Years) (2 of 2 - PCV) 06/26/2025 (Originally 12/4/2019)    Hepatitis A Vaccine (1 of 2 - Risk 2-dose series) 06/26/2025 (Originally 6/20/1985)    PT PLAN OF CARE  02/07/2025    Kidney Health Evaluation: Albumin/Creatinine Ratio  05/29/2025    BMI: Followup Plan  11/21/2025    Kidney Health Evaluation: GFR  11/21/2025    BMI: Adult  01/14/2026    RSV Vaccine Age 60+ Years (1 - 1-dose 75+ series) 06/20/2041    HIV Screening  Completed    Hepatitis C Screening  Completed    Influenza Vaccine  Completed    Meningococcal B Vaccine  Aged Out    RSV Vaccine age 0-20 Months  Aged Out    HIB Vaccine  Aged Out    IPV Vaccine  Aged Out    Meningococcal ACWY Vaccine  Aged Out    HPV Vaccine  Aged Out     Immunization History "   Administered Date(s) Administered    COVID-19 PFIZER VACCINE 0.3 ML IM 03/30/2021, 04/24/2021    INFLUENZA 12/11/2014, 10/09/2015, 12/21/2016, 09/28/2017    Influenza Quadrivalent, 6-35 Months IM 12/21/2016    Influenza Recombinant Preservative Free Im 12/05/2024    Influenza, injectable, quadrivalent, preservative free 0.5 mL 11/06/2023    Influenza, recombinant, quadrivalent,injectable, preservative free 12/04/2018, 11/01/2019, 10/23/2020, 01/24/2022, 11/07/2022    Influenza, seasonal, injectable 12/11/2014, 10/09/2015, 09/28/2017    Pneumococcal Polysaccharide PPV23 12/04/2018         ----------------------------------------------------  Jessenia Alba MD, PGY-2  Internal Medicine Residency   [facility] [place] [place]

## 2025-01-14 NOTE — ASSESSMENT & PLAN NOTE
GOC discussed with pt this visit. He states that he is agreeable to dialysis if needed in the future, but he would not want aggressive resuscitation measures and wishes to be DNR/DNI. Offered to fill out POLST with him today, but he declined.

## 2025-01-14 NOTE — PROGRESS NOTES
Outpatient Care Management Note:    Re:  in person follow up     Patient at PCP office today to follow up regarding his diabetes. Patient seeing Dr. Alba today. I met with patient before his visit. I explained my role and reason for outreach. Patient shares he is not taking is insulin at this time due to not being able to check his blood sugars. Patient has not picked up glucometer and supplies (One Touch Verio) at [facility] pharmacy and was waiting for a text message from them that it is ready. Unclear if patient has tried to use glucometer Contour Next Gen that was given to him at PCP office at prior visits. Reviewed there are at least 10 test strips with glucometer we gave him which could be 5 days of blood sugar testing. Reviewed should be testing at least twice a day when he get up before eating breakfast and before dinner and keeping a log. Patient reports he has logs we gave him at home. Reports has 70/30 insulin and states was taking 50 units. Patient and significant other previously reported last week taking 30 units in the AM and 20 units in the PM. I did review with him again that he was prescribed to take 20 units in the AM and 10 units in the PM. Patient voiced understanding. Patient shares he has plenty of insulin at home at this time. Will make Bettina Rubalcava MPAP specialist aware no need for med assistance program at this time. Patient does have insurance that started this year. Will re-consult as needed to medication assistance program.     I called [facility] pharmacy and they have One Touch Verio glucometer, test strips and lancets ready for patient. Pharmacy reports patient has a deductible he needs to meet and cost will be $76.76 at this time. I did make patient aware of this. Strongly encouraged he obtain glucometer and supplies this week and start taking insulin to avoid complications or possible hospitalization.     I asked patient if there were any other reasons for him not checking blood sugar  "or taking insulin. He reports \"laziness.\" Again educated patient on need for insulin to further manage his diabetes and importance of checking blood sugars. Patient prefers to do fingersticks and declines CGM at this time. He shares he had Freestyle CGM in the past and issues with it giving him readings. Suggested could try Dexcom and come back for a nurse visit for further education on how to use. Patient declining at this time.     Dr. Alba updated. Please see her note for additional information.     Patient agreeable to further outreach call end of this week.     Patient does not have any further questions, concerns, or other needs at this time. Patient has my contact number [phone] and PCP office number [phone] if needed.       "

## 2025-01-15 ENCOUNTER — PATIENT OUTREACH (OUTPATIENT)
Dept: INTERNAL MEDICINE CLINIC | Facility: CLINIC | Age: 60
End: 2025-01-15

## 2025-01-15 NOTE — PROGRESS NOTES
Medication Patient Assistance Program (MPAP) Specialist  received In Basket message from RN CM . MPAP specialist reviewed patient chart prior to outreach. Per message and RN CM note patient has insulin and does not need assistance. MPAP specialist is closing referral to Medication Patient Assistant  at this time. MPAP Specialist has referred patient to clinician and clinical staff for further medication questions.

## 2025-01-17 ENCOUNTER — APPOINTMENT (OUTPATIENT)
Dept: PHYSICAL THERAPY | Facility: CLINIC | Age: 60
End: 2025-01-17
Payer: MEDICARE

## 2025-01-17 ENCOUNTER — PATIENT OUTREACH (OUTPATIENT)
Dept: INTERNAL MEDICINE CLINIC | Facility: CLINIC | Age: 60
End: 2025-01-17

## 2025-01-17 ENCOUNTER — APPOINTMENT (OUTPATIENT)
Dept: OCCUPATIONAL THERAPY | Facility: CLINIC | Age: 60
End: 2025-01-17
Payer: MEDICARE

## 2025-01-17 NOTE — PROGRESS NOTES
"Daily Note     Today's date: 2025  Patient name: Tommie Taylor  : 1965  MRN: [id number]  Referring provider: Harshad Romo*  Dx:   Encounter Diagnosis     ICD-10-CM    1. History of CVA (cerebrovascular accident)  Z86.73       2. Weakness of right arm  R29.898           Start Time: 1215  Stop Time: 1300  Total time in clinic (min): 45 minutes    Subjective: Feeling good      Objective: See treatment diary below      Assessment: Pt tolerated the treatment well. Pt was challenged with use of AW on the RLE to promote increased foot clearance and error augmentation for recovery post-stroke.  Pt is progressing with phil navigation with improved clearance of RLE as well as step-ups with ability to perform on RLE with 6\" step this session. Pt had difficulty with transitioning from even and uneven surfaces as well as quick turning with blazepod activity with some LOB. Pt demonstrated fatigue post-session. Pt would benefit from further skilled PT sessions to address deficits in endurance, strength, balance, activity tolerance, and overall safety needed to maximize the pt's function and quality of life.       Plan: Continue per plan of care.  Progress treatment as tolerated.       Short Term Goal Expiration Date:(2025)  Long Term Goal Expiration Date: (2025)  POC Expiration Date: (2025)    Visit count: 3 of 10; PN due 2025    POC expires Unit limit Auth Expiration date PT/OT/ST + Visit Limit?   25 6 PT/OT 25 $2410 CAP/BOMN                           Visit/Unit Tracking  AUTH Status:  Date            Every 10 visits  Used 1 2 3            Remaining  9 8 7                 Precautions: Hx of stroke, HTN, orthostatic hypotension, T2DM, fall risk       Manuals                                      Neuro Re-Ed         Pt education  Pt education on all neuro testing including oculomotor screening, Tug, FGA, and mCTSIB     Pt education on " "typical stroke recovery and the benefits of therapy for neuroplasticity and function        Treadmill   Solo   0.6 mph   0% incline   X 6 minute     Mod verbal cues for R foot clearance and step length       STS  No UE   2 x 10  No UE   2 x 10      Hurdles   Solo   1/4 track   (6) 6\" hurdles   Fwd x 2 laps   Lat x 2 laps   RPE: 5/10 Solo   1/4 track   3# AW on R   Fwd x 2 laps   Lat x 2 laps      Blazepods  Solo   1/4 track   4 pods   Fwd/bwd walking  1 min rounds  Round 1: 8 hits   Round 2: 8 hits Solo   1/4 track   3# AW on R  4 pods in 4 corners   (Step with R foot)   1 min round   Round 1: 10 hits   Round 2: 10 hits     Step-ups  Solo   6\" step   No UE   1 x 10 on L     4\" step   2 x 10 on R   RPE: 5/10 Solo   6\" step   3# AW on R   No UE   1 x 10 on L   1 x 4 on R   1 x 10 on R     Dynamic Balance   Solo   1/4 track   3# AW on R   (3) airex   Fwd x 2 laps   Lat x 2 laps     Solo  1/4 track   3# AW on R  Bwd walking   X 2 laps      Resisted walking    Hallway (100ft)   Purple TB  3# AW on R  Fwd X 2 laps      Ther Ex        Pt education  Pt education on all testing including 6MWT, 5xSTS, and MMT                                                               Ther Activity                        Gait Training                        Modalities                                           "

## 2025-01-17 NOTE — PROGRESS NOTES
Outpatient Care Management Note:    Re:  follow up call - diabetes    I called patient and no answer. Message left with my role and reason for call and requested a call back and left my contact number.     Calling to see if patient obtained glucometer and supplies at [facility]. Please see patient outreach note from 1/14/25 with more information. Calling to see how blood sugars are and if he has restarted to take his insulin.

## 2025-01-20 ENCOUNTER — OFFICE VISIT (OUTPATIENT)
Dept: PHYSICAL THERAPY | Facility: CLINIC | Age: 60
End: 2025-01-20
Payer: MEDICARE

## 2025-01-20 DIAGNOSIS — Z86.73 HISTORY OF CVA (CEREBROVASCULAR ACCIDENT): Primary | ICD-10-CM

## 2025-01-20 DIAGNOSIS — R29.898 WEAKNESS OF RIGHT ARM: ICD-10-CM

## 2025-01-20 PROCEDURE — 97112 NEUROMUSCULAR REEDUCATION: CPT

## 2025-01-22 ENCOUNTER — PATIENT OUTREACH (OUTPATIENT)
Dept: INTERNAL MEDICINE CLINIC | Facility: CLINIC | Age: 60
End: 2025-01-22

## 2025-01-22 NOTE — PROGRESS NOTES
"Outpatient Care Management Note:    Re:  follow up call - diabetes    I called and spoke with patient and his significant other Marie. They report he did  glucometer and supplies but has not started to check his blood sugar or take his insulin again now that he has testing supplies. He denies any issues with using glucometer and I did offer for him to come into office for further education but does not feel he needs.     I asked for him to confirm if they have 70/30 insulin at home. Significant other voices that she discussed with a doctor to take basal/bolus insulin. I reviewed last 3 visits have him prescribed to take 70/30 insulin and to take 20 units in the morning with breakfast and 10 units in the evening with dinner. Will request for provider to send new script to [facility] pharmacy.     Reviewed importance of taking his medications as prescribed to help manage his chronic conditions. Reviewed risks of uncontrolled diabetes. I asked why he has not started checking his blood sugars and he states \"laziness.\" Reviewed importance of checking blood sugars and bring log to upcoming PCP appointment on 2/5.   "

## 2025-01-23 ENCOUNTER — PATIENT OUTREACH (OUTPATIENT)
Dept: INTERNAL MEDICINE CLINIC | Facility: CLINIC | Age: 60
End: 2025-01-23

## 2025-01-23 DIAGNOSIS — E11.22 TYPE 2 DIABETES MELLITUS WITH STAGE 4 CHRONIC KIDNEY DISEASE AND HYPERTENSION (HCC): ICD-10-CM

## 2025-01-23 DIAGNOSIS — N18.4 TYPE 2 DIABETES MELLITUS WITH STAGE 4 CHRONIC KIDNEY DISEASE AND HYPERTENSION (HCC): ICD-10-CM

## 2025-01-23 DIAGNOSIS — I12.9 TYPE 2 DIABETES MELLITUS WITH STAGE 4 CHRONIC KIDNEY DISEASE AND HYPERTENSION (HCC): ICD-10-CM

## 2025-01-23 RX ORDER — HUMAN INSULIN 100 [IU]/ML
INJECTION, SUSPENSION SUBCUTANEOUS
Qty: 10 ML | Refills: 6 | Status: SHIPPED | OUTPATIENT
Start: 2025-01-23

## 2025-01-27 ENCOUNTER — PATIENT OUTREACH (OUTPATIENT)
Dept: INTERNAL MEDICINE CLINIC | Facility: CLINIC | Age: 60
End: 2025-01-27

## 2025-01-27 ENCOUNTER — TELEPHONE (OUTPATIENT)
Dept: BARIATRICS | Facility: CLINIC | Age: 60
End: 2025-01-27

## 2025-01-27 NOTE — TELEPHONE ENCOUNTER
Spoke with Pt's wife as Pt was sleeping.  Clarified that he was halted from the Bariatric program.   She confirmed understanding.

## 2025-01-27 NOTE — PROGRESS NOTES
Outpatient Care Management Note:    Re:  follow up call diabetes - insulin     I called patient and no answer. Message left with reason for call and requested a call back. Contact number left [phone] and PCP office number [phone]. I did remind patient of cardiology appointment tomorrow 1/28 @ 3 pm. Encouraged to attend.     I was calling to remind patient if he has not already to reach out to [facility] pharmacy to provide them with his insurance information to further process script for 70/30 insulin. Requested a call back to see how he is feeling and if he is checking his blood sugars and if he has 70/30 insulin at home at this time and started taking.

## 2025-01-28 ENCOUNTER — CONSULT (OUTPATIENT)
Dept: CARDIOLOGY CLINIC | Facility: CLINIC | Age: 60
End: 2025-01-28
Payer: MEDICARE

## 2025-01-28 VITALS
SYSTOLIC BLOOD PRESSURE: 138 MMHG | BODY MASS INDEX: 43.42 KG/M2 | HEART RATE: 70 BPM | OXYGEN SATURATION: 100 % | DIASTOLIC BLOOD PRESSURE: 76 MMHG | WEIGHT: 269 LBS

## 2025-01-28 DIAGNOSIS — E66.813 CLASS 3 SEVERE OBESITY DUE TO EXCESS CALORIES WITH SERIOUS COMORBIDITY AND BODY MASS INDEX (BMI) OF 40.0 TO 44.9 IN ADULT (HCC): ICD-10-CM

## 2025-01-28 DIAGNOSIS — E66.01 CLASS 3 SEVERE OBESITY DUE TO EXCESS CALORIES WITH SERIOUS COMORBIDITY AND BODY MASS INDEX (BMI) OF 40.0 TO 44.9 IN ADULT (HCC): ICD-10-CM

## 2025-01-28 DIAGNOSIS — M89.9 CHRONIC KIDNEY DISEASE-MINERAL AND BONE DISORDER (CKD-MBD): ICD-10-CM

## 2025-01-28 DIAGNOSIS — I63.9 CEREBRAL INFARCTION, UNSPECIFIED MECHANISM (HCC): ICD-10-CM

## 2025-01-28 DIAGNOSIS — N18.9 CHRONIC KIDNEY DISEASE-MINERAL AND BONE DISORDER (CKD-MBD): ICD-10-CM

## 2025-01-28 DIAGNOSIS — I63.9 CEREBROVASCULAR ACCIDENT (CVA), UNSPECIFIED MECHANISM (HCC): ICD-10-CM

## 2025-01-28 DIAGNOSIS — I10 PRIMARY HYPERTENSION: ICD-10-CM

## 2025-01-28 DIAGNOSIS — I63.512 CEREBROVASCULAR ACCIDENT (CVA) DUE TO OCCLUSION OF LEFT MIDDLE CEREBRAL ARTERY (HCC): Primary | ICD-10-CM

## 2025-01-28 DIAGNOSIS — E83.9 CHRONIC KIDNEY DISEASE-MINERAL AND BONE DISORDER (CKD-MBD): ICD-10-CM

## 2025-01-28 PROCEDURE — 99204 OFFICE O/P NEW MOD 45 MIN: CPT | Performed by: INTERNAL MEDICINE

## 2025-01-28 NOTE — PROGRESS NOTES
Cardiology Consultation     Tommie Taylor  [id number]  1965  HEART & VASCULAR [facility]  [address] [id number]    Orders Placed This Encounter   Procedures    Zio Monitor         Assessment & Plan  Cerebrovascular accident (CVA) due to occlusion of left middle cerebral artery (HCC)  CVA in a young gentleman. He does have significant risk factors of diabetes and hypertension which were not well-controlled prior to his CVA.    Check 2 weeks Zio patch to exclude any significant arrhythmias. If A-fib is found, would warrant changing to anticoagulation as opposed to antiplatelet.    Otherwise, continue blood pressure control as ordered. He is not on an ARB given his advanced CKD. He follows with nephrology, Dr. No.    Primary hypertension  On atenolol, nifedipine, hydralazine. Unclear to me if he is taking the hydralazine 3 times a day but limited options with ARB or other antihypertensives given his advanced CKD so we will continue this for now.  Chronic kidney disease-mineral and bone disorder (CKD-MBD)  Lab Results   Component Value Date    EGFR 18 11/21/2024    EGFR 15 11/18/2024    EGFR 15 11/15/2024    CREATININE 3.49 (H) 11/21/2024    CREATININE 4.08 (H) 11/18/2024    CREATININE 3.94 (H) 11/15/2024   CKD 4. Last creatinine 3.5. Follows with nephrology.  Class 3 severe obesity due to excess calories with serious comorbidity and body mass index (BMI) of 40.0 to 44.9 in adult (HCC)          History of Present Illness:    59-year-old man who was referred to the office today by neurology for follow-up after CVA.    Patient has hypertension, diabetes not optimally controlled previously. He presented to the hospital in October with right upper and lower extremity weakness. Underwent TNK administration.  No evidence of any arrhythmias.    His echocardiogram showed LVH and a LVOT dynamic gradient with Valsalva, but  he was hyperdynamic. There does not appear to be any findings of hypertrophic cardiomyopathy otherwise.    He was started on further medical therapy and referred for follow-up. He has CKD 4.    Patient denies any palpitations currently. He is working with physical therapy still with significant right arm weakness but his legs are doing better he is walking with a cane.    Left frontal lobe infarct.  CTA of the neck was unremarkable.    Patient Active Problem List   Diagnosis    Bipolar I disorder, severe, current or most recent episode depressed, with psychotic features ([facility])    Panic attacks    Flat foot    Diabetic polyneuropathy ([facility])    Allergic rhinitis    GERD (gastroesophageal reflux disease)    Insomnia    Hiatal hernia    Lower esophageal ring ([place])    Generalized anxiety disorder    DAISY (obstructive sleep apnea)    Persistent proteinuria    Anemia, unspecified    Toenail deformity    Hyperlipidemia    Vitamin D deficiency    Class 3 severe obesity due to excess calories with serious comorbidity and body mass index (BMI) of 40.0 to 44.9 in adult ([facility])    Lightheaded    Loss of appetite    Unintended weight loss    Tardive dyskinesia    Hypertension    Memory deficit    Tremor    B12 deficiency    Vision decreased    Type 2 diabetes mellitus with stage 4 chronic kidney disease and hypertension ([facility])    CVA (cerebral vascular accident) ([facility])    Encephalopathy    Volume overload    Acid-base disorder, mixed    Stroke-like symptoms    Impaired mobility and activities of daily living    At risk for alteration in bowel function    Overgrown toenails    Fall during current hospitalization    Orthostatic hypotension    Chronic kidney disease-mineral and bone disorder (CKD-MBD)    Spasticity    Hypomagnesemia    Hospital discharge follow-up    Cerebrovascular accident (CVA) due to occlusion of left middle cerebral artery ([facility])    Goals of care, counseling/discussion     Past Medical History:   Diagnosis Date     "ADHD, adult residual type     Anxiety     Anxiety     Bipolar 1 disorder (HCC)     Cataract     Left eye    Chronic kidney disease     Colon polyp     CPAP (continuous positive airway pressure) dependence     Depression     Depression     Diabetes mellitus (HCC)     blood sugar 167 on admission    Equinus deformity of foot     GERD (gastroesophageal reflux disease)     Homicidal ideations     Hyperlipidemia     Hypertension     Microalbuminuria     Morbid obesity (HCC)     Neuropathy     Shortness of breath     Sleep apnea     CPAP at bedtime    Sleep apnea     Stroke (HCC)     Suicidal intent      Social History     Tobacco Use    Smoking status: Former     Current packs/day: 0.00     Types: Cigarettes     Quit date:      Years since quittin.1    Smokeless tobacco: Current     Types: Chew    Tobacco comments:     pt \"Quit after approx over 25 years ago\"   Vaping Use    Vaping status: Never Used   Substance Use Topics    Alcohol use: Not Currently     Comment: rarely    Drug use: Not Currently     Comment: quit 20 years ago      Family History   Problem Relation Age of Onset    Diabetes Mother     Alcohol abuse Father     Diabetes Father     Hypertension Father     Lung cancer Father     Stroke Father     Cancer Father         lung    Alcohol abuse Sister     Depression Sister     Lymphoma Sister     Alcohol abuse Family     Alcohol abuse Sister     Alcohol abuse Sister     Cancer Sister     Heart disease Neg Hx     Thyroid disease Neg Hx      Past Surgical History:   Procedure Laterality Date    CATARACT EXTRACTION      CATARACT EXTRACTION      COLONOSCOPY      HAND SURGERY Right     OTHER SURGICAL HISTORY      REPAIR OF SUPERFICIAL WOUND FACE    OH ESOPHAGOGASTRODUODENOSCOPY TRANSORAL DIAGNOSTIC N/A 2018    Procedure: ESOPHAGOGASTRODUODENOSCOPY (EGD);  Surgeon: Yinka Maier MD;  Location: [facility];  Service: Gastroenterology    OH ESOPHAGOGASTRODUODENOSCOPY TRANSORAL DIAGNOSTIC N/A 2018    " Procedure: EGD AND COLONOSCOPY;  Surgeon: Yinka Maier MD;  Location: [facility];  Service: Gastroenterology       Current Outpatient Medications:     aspirin (ECOTRIN LOW STRENGTH) 81 mg EC tablet, Take 1 tablet (81 mg total) by mouth daily, Disp: , Rfl:     atenolol (TENORMIN) 25 mg tablet, Take 1 tablet (25 mg total) by mouth daily, Disp: 90 tablet, Rfl: 3    atorvastatin (LIPITOR) 20 mg tablet, Take 1 tablet (20 mg total) by mouth daily, Disp: 90 tablet, Rfl: 3    Blood Glucose Monitoring Suppl (Assure 3 Meter) KIT, Use 1 each 4 (four) times a day Fine to substitute other brand if not covered by insurance, Disp: 1 kit, Rfl: 2    Blood Glucose Monitoring Suppl (Blood Glucose Monitor System) w/Device KIT, Use 3 (three) times a day, Disp: 1 kit, Rfl: 0    Blood Glucose Monitoring Suppl (FREESTYLE LITE) ANTONIA, by Does not apply route 3 (three) times a day, Disp: 1 each, Rfl: 0    Blood Glucose Monitoring Suppl (FreeStyle Lite) w/Device KIT, Use as instructed 4 times a day, Disp: 1 kit, Rfl: 0    buPROPion (WELLBUTRIN) 100 mg tablet, Take 1 tablet (100 mg total) by mouth 2 (two) times a day Take bid--(once in the AM and once at 12 noon), Disp: 60 tablet, Rfl: 0    hydrALAZINE (APRESOLINE) 10 mg tablet, Take 1 tablet (10 mg total) by mouth every 8 (eight) hours, Disp: 90 tablet, Rfl: 0    Insulin Pen Needle 31G X 8 MM MISC, Check sugars three times a day, Disp: 100 each, Rfl: 1    Lancets (freestyle) lancets, Use as instructed, Disp: 300 each, Rfl: 0    Lancets MISC, Use 3 (three) times a day, Disp: 300 each, Rfl: 3    NIFEdipine (ADALAT CC) 90 mg 24 hr tablet, Take 1 tablet (90 mg total) by mouth daily, Disp: 90 tablet, Rfl: 1    Valbenazine Tosylate (Ingrezza) 40 MG CAPS, Take 40 mg by mouth daily at bedtime, Disp: 90 capsule, Rfl: 0    acetaminophen (TYLENOL) 325 mg tablet, Take 2 tablets (650 mg total) by mouth every 6 (six) hours as needed for mild pain, fever or headaches (Patient not taking: Reported on  "1/6/2025), Disp: , Rfl:     ammonium lactate (LAC-HYDRIN) 12 % cream, Apply topically as needed for dry skin (Patient not taking: Reported on 1/6/2025), Disp: 385 g, Rfl: 0    calcium carbonate (TUMS) 500 mg chewable tablet, Chew 2 tablets (1,000 mg total) 2 (two) times a day as needed for indigestion or heartburn (Patient not taking: Reported on 1/6/2025), Disp: , Rfl:     Continuous Blood Gluc  (FreeStyle [place] 14 Day [place]) ANTONIA, Use  to check BG at least 4 times a day (Patient not taking: Reported on 1/6/2025), Disp: 1 each, Rfl: 0    Continuous Blood Gluc Sensor (FreeStyle [place] 14 Day Sensor) MISC, Apply sensor every 14 days to check BG at least 4 times a day (Patient not taking: Reported on 4/24/2024), Disp: 2 each, Rfl: 5    ergocalciferol (VITAMIN D2) 50,000 units, Take 1 capsule (50,000 Units total) by mouth 3 (three) times a week (Patient not taking: Reported on 1/28/2025), Disp: 12 capsule, Rfl: 0    glucose blood (FREESTYLE LITE) test strip, CHECK BLOOD SUGARS FOUR TIMES DAILY (Patient not taking: Reported on 1/28/2025), Disp: 400 strip, Rfl: 3    glucose blood test strip, CHECK BLOOD GLUCOSE 3 TIMES DAILY (Patient not taking: Reported on 1/28/2025), Disp: 300 each, Rfl: 3    insulin NPH-insulin regular (NovoLIN 70/30 ReliOn) 100 units/mL subcutaneous injection, Inject 20 Units under the skin daily before breakfast AND 10 Units in the evening. Take before meals. Use 20 units with breakfast and 10 units with dinner. (Patient not taking: Reported on 1/28/2025), Disp: 10 mL, Rfl: 6    INSULIN SYRINGE .5CC/29G 29G X 1/2\" 0.5 ML MISC, Use (Patient not taking: Reported on 7/19/2024), Disp: , Rfl:     magnesium gluconate (MAGONATE) 500 MG tablet, Take 1 tablet (500 mg total) by mouth daily Do not start before November 16, 2024. (Patient not taking: Reported on 1/28/2025), Disp: 30 tablet, Rfl: 0  Allergies   Allergen Reactions    Pollen Extract Nasal Congestion    Tetanus Toxoid Swelling "       Vitals:    01/28/25 1450   BP: 138/76   Pulse: 70   SpO2: 100%   Weight: 122 kg (269 lb)     Vitals:    01/28/25 1450   Weight: 122 kg (269 lb)          Body mass index is 43.42 kg/m².    Physical Exam:  GENERAL: Alert, well appearing, and in no distress  HEENT:  PERRL, EOMI, no scleral icterus, no conjunctival pallor  NECK:  Supple, No elevated JVP, no thyromegaly, no carotid bruits  HEART:  Regular rate and rhythm, normal S1/S2, no S3/S4, no murmur or rub  LUNGS:  Clear to auscultation bilaterally  ABDOMEN:  Soft, non-tender, positive bowel sounds, no rebound or guarding  EXTREMITIES:  RUE weakness, RLE slight weakness.  VASCULAR:  Normal pedal pulses   NEURO: No focal deficits,  SKIN: Normal without suspicious lesions on exposed skin      ROS:  Positive for weakness, CVA, palpitations previously, none currently.  Except as noted in HPI, is otherwise reviewed in detail and a 12 point review of systems is negative.    Labs:  Lab Results   Component Value Date    SODIUM 137 11/21/2024    K 4.7 11/21/2024     (H) 11/21/2024    CREATININE 3.49 (H) 11/21/2024    BUN 36 (H) 11/21/2024    CO2 22 11/21/2024    ALT 9 10/17/2024    AST 21 10/17/2024    INR 1.13 10/17/2024    GLUF 80 11/15/2024    HGBA1C 8.6 (H) 10/16/2024    WBC 6.27 11/18/2024    HGB 10.2 (L) 11/18/2024    HCT 30.6 (L) 11/18/2024     11/18/2024       Lab Results   Component Value Date    CHOL 104 09/09/2014    CHOL 115 08/11/2014    CHOL 109 11/14/2013     Lab Results   Component Value Date    HDL 40 10/16/2024    HDL 42 10/15/2024    HDL 69 04/19/2024     Lab Results   Component Value Date    LDLCALC 90 10/16/2024    LDLCALC 77 10/15/2024    LDLCALC 84 04/19/2024     Lab Results   Component Value Date    TRIG 120 10/16/2024    TRIG 149 10/15/2024    TRIG 75 04/19/2024       Testing:  Echo 10/2024  Left Ventricle: Left ventricular cavity size is normal. Wall thickness is moderately increased. There is moderate concentric hypertrophy. The  left ventricular ejection fraction is 70%. Systolic function is vigorous. Wall motion is normal. Diastolic function is moderately abnormal, consistent with grade II (pseudonormal) relaxation. There is with valsalva with a peak gradient of 36.0 mmHg.    Aortic Valve: There is mild regurgitation.

## 2025-01-28 NOTE — ASSESSMENT & PLAN NOTE
CVA in a young gentleman. He does have significant risk factors of diabetes and hypertension which were not well-controlled prior to his CVA.    Check 2 weeks Zio patch to exclude any significant arrhythmias. If A-fib is found, would warrant changing to anticoagulation as opposed to antiplatelet.    Otherwise, continue blood pressure control as ordered. He is not on an ARB given his advanced CKD. He follows with nephrology, Dr. No.

## 2025-01-28 NOTE — ASSESSMENT & PLAN NOTE
On atenolol, nifedipine, hydralazine. Unclear to me if he is taking the hydralazine 3 times a day but limited options with ARB or other antihypertensives given his advanced CKD so we will continue this for now.

## 2025-01-28 NOTE — ASSESSMENT & PLAN NOTE
Lab Results   Component Value Date    EGFR 18 11/21/2024    EGFR 15 11/18/2024    EGFR 15 11/15/2024    CREATININE 3.49 (H) 11/21/2024    CREATININE 4.08 (H) 11/18/2024    CREATININE 3.94 (H) 11/15/2024   CKD 4. Last creatinine 3.5. Follows with nephrology.

## 2025-01-29 NOTE — PROGRESS NOTES
Daily Note     Today's date: 2025  Patient name: Tommie Taylor  : 1965  MRN: [id number]  Referring provider: Harshad Romo*  Dx:   Encounter Diagnosis     ICD-10-CM    1. History of CVA (cerebrovascular accident)  Z86.73       2. Weakness of right arm  R29.898           Start Time: 1345  Stop Time: 1430  Total time in clinic (min): 45 minutes    Subjective: Arm actually feels so much better with OT, it was really tight. Wants to be able to clip his fingernails       Objective: See treatment diary below      Assessment: Pt tolerated the treatment well. Pt performed walking on the treadmill to promote cardiovascular endurance and neural priming for the session. Pt was challenged with agility ladder this session for LE coordination and RLE clearance. Pt had most difficulty with lateral stepping due to RLE fatigue.  Pt is progressing with STS performance with increased speed and act tolerance. Pt also demonstrated increased gait speed with resisted walking in hallway. Pt demonstrated fatigue post-session. Pt would benefit from further skilled PT sessions to address deficits in endurance, strength, balance, activity tolerance, and overall safety needed to maximize the pt's function and quality of life.       Plan: Continue per plan of care.  Progress treatment as tolerated.       Short Term Goal Expiration Date:(2025)  Long Term Goal Expiration Date: (2025)  POC Expiration Date: (2025)    Visit count: 4 of 10; PN due 2025    POC expires Unit limit Auth Expiration date PT/OT/ST + Visit Limit?   25 6 PT/OT 25 $2410 CAP/BOMN                           Visit/Unit Tracking  AUTH Status:  Date           Every 10 visits  Used 1 2 3 4           Remaining  9 8 7 6                Precautions: Hx of stroke, HTN, orthostatic hypotension, T2DM, fall risk       Manuals                                     Neuro Re-Ed         Pt  "education  Pt education on all neuro testing including oculomotor screening, Tug, FGA, and mCTSIB     Pt education on typical stroke recovery and the benefits of therapy for neuroplasticity and function        HIGT  Solo   0.6 mph   0% incline   X 6 minute     Mod verbal cues for R foot clearance and step length   Solo  B/ UE   3# AW on R  0.7 mph   0% incline   X 8 minutes     STS  No UE   2 x 10  No UE   2 x 10  No UE   2 x 10     Hurdles   Solo   1/4 track   (6) 6\" hurdles   Fwd x 2 laps   Lat x 2 laps   RPE: 5/10 Solo   1/4 track   3# AW on R   (6) 6\" hurdles  Fwd x 2 laps   Lat x 2 laps  Solo   1/4 track   3# AW on R   (6) 6\" hurdles  Fwd x 2 laps   Lat x 2 laps     Blazepods  Solo   1/4 track   4 pods   Fwd/bwd walking  1 min rounds  Round 1: 8 hits   Round 2: 8 hits Solo   1/4 track   3# AW on R  4 pods in 4 corners   (Step with R foot)   1 min round   Round 1: 10 hits   Round 2: 10 hits     Step-ups  Solo   6\" step   No UE   1 x 10 on L     4\" step   2 x 10 on R   RPE: 5/10 Solo   6\" step   3# AW on R   No UE   1 x 10 on L   1 x 4 on R   1 x 10 on R     Dynamic Balance   Solo   1/4 track   3# AW on R   (3) airex   Fwd x 2 laps   Lat x 2 laps     Solo  1/4 track   3# AW on R  Bwd walking   X 2 laps  Solo   1/4 track   3# AW on R   (3) airex   Fwd x 2 laps   Lat x 2 laps     Solo  1/4 track   3# AW on R   Fwd/bwd walking   X 3 laps     Resisted walking    Hallway (100ft)   Purple TB  3# AW on R  Fwd X 2 laps  Hallway (100ft)   Purple TB   3# AW on R   Fwd x 3 laps     Agility ladder    Solo  1/4 track   3# AW on R  Fwd (2 feet in) x 2 laps     Lat (2 feet in, 2 feet out) x 1 lap    Ickey shuffle x 1 lap    Ther Ex        Pt education  Pt education on all testing including 6MWT, 5xSTS, and MMT                                                               Ther Activity                        Gait Training                        Modalities                                                 "

## 2025-01-30 ENCOUNTER — PATIENT OUTREACH (OUTPATIENT)
Dept: INTERNAL MEDICINE CLINIC | Facility: CLINIC | Age: 60
End: 2025-01-30

## 2025-01-30 ENCOUNTER — EVALUATION (OUTPATIENT)
Dept: OCCUPATIONAL THERAPY | Facility: CLINIC | Age: 60
End: 2025-01-30
Payer: MEDICARE

## 2025-01-30 DIAGNOSIS — Z86.73 HISTORY OF CVA (CEREBROVASCULAR ACCIDENT): Primary | ICD-10-CM

## 2025-01-30 DIAGNOSIS — R29.898 WEAKNESS OF RIGHT ARM: ICD-10-CM

## 2025-01-30 PROCEDURE — 97530 THERAPEUTIC ACTIVITIES: CPT

## 2025-01-30 PROCEDURE — 97166 OT EVAL MOD COMPLEX 45 MIN: CPT

## 2025-01-30 NOTE — PROGRESS NOTES
Outpatient Care Management Note:    Re:  follow up - diabetes    I called patient and significant other, Marie answered phone and states patient is currently at an appointment. Per chart review patient scheduled with occupational therapy today.     I asked if they received my messages about following up with [facility] regarding insurance information. She reports she did several weeks ago. Requested she call them again to provide information because of end of last week they did not have info on file. She states she will call and that patient has Medicare and secondary is [employer] [place]. She shares today that patient's drug coverage does not become effective until 2/1/25. I also requested that patient bring insurance cards and drug coverage card to next appointment to be scanned into chart. Instructed to call with any further issues with obtaining insulin.     Strongly encouraged he start checking blood sugars and taking insulin and reviewed risks of not taking insulin.     Patient scheduled for 2/5 for BP and blood sugar follow up. They are aware. She does state that patient will be bringing [employer] form for physician to complete. She shares they are without a vehicle now and would be helpful for patient to have [employer] as a back up for transportation.     They do not have any further questions, concerns, or other needs at this time. They have my contact number [phone] and PCP office number [phone] if needed. Patient is agreeable for further outreach.

## 2025-01-30 NOTE — PROGRESS NOTES
OCCUPATIONAL THERAPY INITIAL EVALUATION:    1/30/25  Tommie Taylor  1965  [id number]  Harshad Romo*   Diagnosis ICD-10-CM Associated Orders   1. History of CVA (cerebrovascular accident)  Z86.73 Ambulatory Referral to Occupational Therapy      2. Weakness of right arm  R29.898 Ambulatory Referral to Occupational Therapy            Assessment/Plan    Skilled Analysis:  Tommie Taylor is a 59 y.o. male referred to Occupational Therapy s/p History of CVA (cerebrovascular accident) [Z86.73].   Pt participated in skilled OT evaluation and following formalized testing as well as clinical observation, Pt presents with the following areas of deficit:  RUE weakness, tone, decreased ability to complete ADLs, ROM, strength, and education. Therapist completing PROM this date with minimal movement noted with 80* FF and 42* ABD. Tone noted in biceps, wrist flexors and digits. Pt also reported pain at end range elbow flexion, wrist extension, and digit flexion. Pt denies completing stretching at home, and needs assistance for ADLs to don/doff shirt/jackets, but able to don pants with little to no assist. Education completed on having girlfriend complete stretching at shoulder as able for increased carry over. Pt will benefit from skilled Occupational Therapy services 2x/week for 8 weeks with focus on neuro re-ed, there ex, there act, and HEP.  Tommie Taylor is in agreement with POC.      POC expires Unit limit Auth Expiration date PT/OT/ST + Visit Limit?   3/27/25 N/A 12/31/25 BOMN                           Visit/Unit Tracking  AUTH Status:  Date 1/30             BOMN Used 1              Remaining  7                  Duration in weeks: 8 weeks  Plan of care beginning date: 1/30/25  Plan of care expiration date: 3/27/25  PN #1 due: 2/28/25    Precautions: CVA, HTN, high BP, Orthostatic              GOALS    Short Term Goals (4 weeks)  Strength/Endurance  - Pt will demo with G tolerance to supine, and seated  exercise x 10 minutes with minimal rest breaks required for increased engagement in life roles and weekly exercise regimen   - Pt will demo with G understanding of HEP to improve functional progression towards goals in POC and for improved functional use of RUE     AROM/PROM  - Pt will demo with decreased  RUE  shoulder sublux to trace for increased AROM for improved ADL and IADL engagement  - Pt will be able to perform 60* degrees shoulder flexion of proximal RUE to demo increased strength and ROM of affected UE for improved ADLs/IADLs  - Pt will be able to perform near full elbow flexion of distal RUE to demo increased strength and ROM of affected UE for improved ADLs/IADLs  - Pt will demo decreased hypertonicity of Modified Mason Scale (MAS) of RUE to 1 at biceps for improved alternate motor patterns, grasp/release, and termination on command for improved dressing/hygiene   - Pt will demo good carryover of clinic and home tone reduction strategies for improved AROM initiation with functional reach with ADL function    FMC/GMC  - Pt will demo improvement with opening and closing a zipper on Neuro QOL form to with some difficulty demo improved coordination and precision for FM tasks and ADLs  - Pt will increase RUE to independent stabilizer (dependent stabilizer, independent stabilizer, gross assist, semi-functional, functional, fully functional) assist with for ADL/IADL and tabletop tasks for improved functional performance of life roles and salient tasks   - Pt will demo G comprehension of adaptive equipment (long handled reacher/sponge/shoehorn, toileting aid ) use in clinic to improve independence in ADL management in home environment    Modalities  - Pt will tolerate BIONESS/NMES/IASTM for improved motor and sensory performance for overall improved strength, tone reduction, and sensation to inc safety and engagement with ADL/IADL function  - Pt/family will demo G understanding and carryover of use of home  "NMES unit as prescribed to inc carryover of ROM and strengthening strategies of R UE    Long Term Goals (8 weeks)  Strength/Endurance  - Pt will increase ability to complete hand  strength with dynamometer testing by 1-2 lbs through motor tasks to improve RUE functional .  - Pt will demo with G carryover of HEP to improve functional progression towards goals in POC and for improved functional use of RUE     AROM/PROM  - Pt will demo with decreased  RUE  shoulder sublux to no for increased AROM for improved ADL and IADL engagement  - Pt will be able to perform 90* degrees shoulder flexion of proximal RUE to demo increased strength and ROM of affected UE for improved ADLs/IADLs  - Pt will be able to perform full elbow flexion of distal RUE to demo increased strength and ROM of affected UE for improved ADLs/IADLs    FMC/GMC  - Pt will demo improvement with opening and closing a zipper on Neuro QOL form to with little difficulty demo improved coordination and precision for FM tasks and ADLs  - Pt will increase RUE to gross assist (dependent stabilizer, independent stabilizer, gross assist, semi-functional, functional, fully functional) assist with for ADL/IADL and tabletop tasks for improved functional performance of life roles and salient tasks     Modalities  - Pt will tolerate BIONESS/NMES/IASTM for improved motor and sensory performance for overall improved strength, tone reduction, and sensation to inc safety and engagement with ADL/IADL function  - Pt/family will demo G understanding and carryover of use of home NMES unit as prescribed to inc carryover of ROM and strengthening strategies of R UE      Subjective    PATIENT GOAL: \"I just want to get it to move again. \"    Patient-Specific Functional Scale   Task is scored 0 (unable to perform activity) to 10 (ability to perform activity independently)    Activity Date: Date:   ADLs- dressing     2.        3.        4.            HISTORY OF PRESENT ILLNESS: " "    Pt is a 59 y.o. male who was referred to Occupational Therapy s/p History of CVA (cerebrovascular accident) [Z86.73]. Pt reports to have had a stroke back in October. Pt lives with his girlfriend and he was home when he started to not feel right and then fell over. Pt reports that his girlfriend helps with ADLs mainly getting shirt and jackets on with his arm. He states that he is not sure how long he was in the hospital for and does not recall ever getting therapy for his UE. Per chart review, Pt had been reciving home health therapy for PT, OT, and SLP however per Pt choice, stopping therapy. Pt had been educated on AD in past sessions with Home health as well as increased movement when in therapy. Pt was not working prior to stroke and reports to not do much at home. When asked if Pt completes stretching at home for his RUE, pt denies and states that he \"just lets it go\". Pt reports that his girlfriends father assists in driving Pt to his appts as able.     Per chart review on 10/14/25, \"Tommie Taylor is a 59 y.o. male patient who originally presented to the hospital on 10/14/2024 due to right upper and lower extremity weakness.  On this admission, patient was found to have acute CVA.  MRI revealed left corona radiata stroke.  Neurologist was on board.  TNK was administered in the ICU.  With hypertensive emergency, patient was also noted nicardipine drip.  Patient eventually was placed on dual antiplatelet therapy for total of 21 days, then continue aspirin monotherapy indefinitely as per neurology.  Continue atorvastatin.  Recommended Zio patch in the outpatient to evaluate for any possible arrhythmia, for which PCP may facilitate this.  As per neurologist, patient will follow-up with them in the office in 6 to 8 weeks.  Patient's hospital stay was complicated with him developing acute kidney injury likely secondary to ATN versus TMA in the setting of uncontrolled blood pressure and complicated with " "contrast associated nephropathy.  Nephrologist was on board.  This past few days, patient's serum creatinine improving slowly.  Nephrologist was okay with the discharge and the patient today.  Patient's BMP should be monitored closely  In the rehab facility and in the outpatient.  Patient's condition improved, but still had significant right sided weakness/paresis, and agreed with acute rehabilitation facility placement and discharge today.\"    PMH:   Past Medical History:   Diagnosis Date    ADHD, adult residual type     Anxiety     Anxiety     Bipolar 1 disorder ([facility])     Cataract     Left eye    Chronic kidney disease     Colon polyp     CPAP (continuous positive airway pressure) dependence     Depression     Depression     Diabetes mellitus ([facility])     blood sugar 167 on admission    Equinus deformity of foot     GERD (gastroesophageal reflux disease)     Homicidal ideations     Hyperlipidemia     Hypertension     Microalbuminuria     Morbid obesity ([facility])     Neuropathy     Shortness of breath     Sleep apnea     CPAP at bedtime    Sleep apnea     Stroke ([facility])     Suicidal intent          Pain Levels   Resting:  3- in L hand    With Activity:  4- in L hand.      Objective    Impairment Observations:            CATALINO ANDRADE Comments           UPPER EXTREMITY FUNCTION   Impaired Intact Dominant Hand: R     /PINCH STRENGTH             Dynamometer    - Gross Grasp unable 65 lbs low   Pinch Meter     - Pincer unable 15 lbs low    - Tripod unable 15 lbs low    - Lateral unable 9 lbs low     AROM (Seated)             Scapula   - Retraction/Protraction  - Elevation/Depression  - Upward/Downward rotation      Shoulder FF unable  WFL     Shoulder Ext 20  WFL      Shoulder internal rotation       Shoulder external rotation       Shoulder Abd 40*  WFL      Shoulder Add Near full  WFL      Horizontal Abd        Horizontal Add        Elbow Flex 60  WFL      Elbow Ext Near full  WFL      Pronation Full  WFL      Supination " neutral  WFL      Wrist Flex trace  WFL      Wrist Ext unable  WFL      Digit Flex trace  WFL      Digit Ex 1/2  WFL      Composite Grasp   WFL      Hook Grasp   WFL      Subluxation  Anterior 1 FB         AROM (Supine)             Shoulder FF 80*  WFL      Shoulder Ext  WFL      Shoulder ABD 42*   WFL      Shoulder ADD   WFL      Horizontal ABD        Horizontal ADD        Elbow Flex 1/2  WFL      Elbow Ext Near full  WFL      Pronation Resting position  WFL      Supination Neutral  WFL      Wrist Flex trace  WFL     Wrist Ext Neutral- resting  WFL     Digit Flex Trace  WFL     Digit Ext 1/2  WFL       PROM (supine position)           Shoulder FF 90*  WFL  tone   Shoulder Ext 40*  WFL     Shoulder ABD 90*  WFL   tone   Shoulder ADD 1/2  WFL      Horizontal ABD      Horizontal ADD        Elbow Flex 3/4  WFL   Tone- pain noted   Elbow Ext 3/4  WFL   Tone   Wrist Flex 1/2  WFL      Wrist Ext 1/2  WFL      Digit Flex   WFL      Digit Ext  WFL     Supination  WFL     Pronation   WFL        MMT             Shoulder FF 2+/5 5/5    Shoulder Ext 2+/5 5/5    Shoulder Abduction 2+/5 5/5    Shoulder Adduction 2+/5 5/5    Elbow Flex 2+/5 5/5    Elbow Ext 2+/5 5/5    Wrist Flex 2+/5 5/5    Wrist Ext 2+/5 5/5    Gross Grasp 2/5 5/5      SENSATION      Monofilament Testing  Normal: 2.83 mm    Diminished light touch: 3.61 mm    Diminished protective sensation: 4.31 mm    Loss of protective sensation: 4.56    Complete loss of sensation / deep pressure: 6.65      Sharp / Dull       Proprioception      Hot/Cold Temp      Stereognosis         COORDINATION      Opposition Unable  WFL    Finger to Nose unable WFL    Rapid Alternating Movement unable WFL    9 Hole Peg Test unable   DNT   Fxnl Dexterity Test unable    DNT     TONE: MODIFIED VANNESSA SCALE      No increase in muscle tone (0)      Slight Increase in muscle tone with catch and release or min resist at end range (1)      Slight Increase in muscle tone with catch and release,  followed by min resistance through remainder of range (1+) Triceps, wrist flexors     Increased muscle tone through full range, able to be moved easily (2)      Considerable increase in tone, difficult to move (3)      Rigid in Flexion/Extension (4)                ADL Simulation  Donning t-shirt/jacket-   26 seconds with no assist - unable to zipper        Neuro QOL  Upper Extremity Function (Fine motor, ADL):     Are you able to turn a key in a lock? With some difficulty  Are you able to brush your teeth? With some difficulty  Are you able to make a phone call using a touch tone or key-pad? With some difficulty  Are you able to  coins from a table top? With some difficulty  Are you are able to write with a pen or pencil With much difficulty  Are you able to open and close a zipper? With much difficulty  Are you able to wash and dry your body? With much difficulty  Are you able to shampoo your hair? Unable to do  Are you able to open previously opened jars? With much difficulty  Are you able to hold a plate full of food? With much difficulty  Are you able to pull on trousers? With some difficulty  Are you able to button your shirt? With much difficulty  Are you able to trim your fingernails? With much difficulty  Are you able to cut your toe nails?   Are you able to bend down and  clothing from the floor? With some difficulty  How much DIFFICULTY do you currently have using a spoon to eat a meal? Some difficulty  How much DIFFICULTY do you currently have putting on a pullover shirt? A lot of difficulty  How much DIFFICULTY do you currently have taking off a pullover shirt? A lot of difficulty  How much DIFFICULTY do you currently have removing wrappings from small objects? A lot of difficulty  How much DIFFUCULTY do you currently have opening medications or vitamin containers (e.g. childproof containers, small bottles)? A lot of difficulty        OTHER PLANNED THERAPY INTERVENTIONS:   Supine, seated,  and in stance neuro re-ed  Task-Oriented Training  Tricep AG  NMES/FES  Cryotherapy for tone reduction  Hot Packs for pain  TENS for pain  FMC/prehension  GMC  Proximal to distal teaming  Timed Trials  Manual tx  IASTM  Hand to target  Sensory re-ed (Cutaneous/Proprioceptive)  Seated functional reach: crossing midline  Supine place and hold  WBearing strategies   Closed chain activities  Open chain activities  Mirror Therapy  HEP  Orthotic Development

## 2025-01-31 ENCOUNTER — OFFICE VISIT (OUTPATIENT)
Dept: PHYSICAL THERAPY | Facility: CLINIC | Age: 60
End: 2025-01-31
Payer: MEDICARE

## 2025-01-31 ENCOUNTER — OFFICE VISIT (OUTPATIENT)
Dept: OCCUPATIONAL THERAPY | Facility: CLINIC | Age: 60
End: 2025-01-31
Payer: MEDICARE

## 2025-01-31 DIAGNOSIS — Z86.73 HISTORY OF CVA (CEREBROVASCULAR ACCIDENT): Primary | ICD-10-CM

## 2025-01-31 DIAGNOSIS — R29.898 WEAKNESS OF RIGHT ARM: ICD-10-CM

## 2025-01-31 PROCEDURE — 97112 NEUROMUSCULAR REEDUCATION: CPT

## 2025-01-31 NOTE — PROGRESS NOTES
"Occupational Therapy Daily Note:    Today's date: 2025  Patient name: Tommie Taylor  : 1965  MRN: [id number]  Referring provider: Harshad Romo*  Dx:   Encounter Diagnoses   Name Primary?    History of CVA (cerebrovascular accident) Yes    Weakness of right arm           POC expires Unit limit Auth Expiration date PT/OT/ST + Visit Limit?   3/27/25 N/A 25 BOMN                           Visit/Unit Tracking  AUTH Status:  Date             BOMN Used 1 2             Remaining  7 6                 Duration in weeks: 8 weeks  Plan of care beginning date: 25  Plan of care expiration date: 3/27/25  PN #1 due: 25    Precautions: CVA, HTN, high BP, Orthostatic         Subjective: \"That hurts at my wrist\".     Objective: See treatment below. Session focusing on NMRE, and tone reduction improving AROM improving ADL/IADL mngt.     Neuro Re-ed:  Pt tolerated electrical stimulation with pad placement to triceps and infra/supra spinatus.  Supine on mat, pt achieved AROM >1/4 range FF improving to >1/2 end range w/prolonged stretch, FF into extension descending w/control, Elbow flex/ext to near fuIl.  IASTM to forearm flexors/extensors promoting tone reduction.  Pt reported wrist pain in flex/ext tolerating AROM in ulnar/radial deviation, improving in pain as reported by pt.      Assessment: Tolerated treatment well. Patient would benefit from continued skilled OT.    Plan: Continued skilled OT per POC    "

## 2025-02-03 ENCOUNTER — OFFICE VISIT (OUTPATIENT)
Dept: PHYSICAL THERAPY | Facility: CLINIC | Age: 60
End: 2025-02-03
Payer: MEDICARE

## 2025-02-03 DIAGNOSIS — Z86.73 HISTORY OF CVA (CEREBROVASCULAR ACCIDENT): Primary | ICD-10-CM

## 2025-02-03 DIAGNOSIS — R29.898 WEAKNESS OF RIGHT ARM: ICD-10-CM

## 2025-02-03 PROCEDURE — 97112 NEUROMUSCULAR REEDUCATION: CPT

## 2025-02-03 NOTE — PROGRESS NOTES
Daily Note     Today's date: 2/3/2025  Patient name: Tommie Taylor  : 1965  MRN: [id number]  Referring provider: Harshad Romo*  Dx:   Encounter Diagnosis     ICD-10-CM    1. History of CVA (cerebrovascular accident)  Z86.73       2. Weakness of right arm  R29.898           Start Time: 1425  Stop Time: 1500  Total time in clinic (min): 35 minutes    Subjective: Hoping for more OT, it helped a lot with arm       Objective: See treatment diary below      Assessment: Pt tolerated the treatment well. Pt progressing well with STS performance with improved speed and eccentric control. Pt was challenged with river rocks and foam beam navigation to challenge dynamic balance on compliant surfaces.  Pt had most difficulty with tandem walking. Pt had a few LOB, but was able to use reactive stepping strategies to maintain balance. Pt demonstrated fatigue post-session. Pt would benefit from further skilled PT sessions to address deficits in endurance, strength, balance, activity tolerance, and overall safety needed to maximize the pt's function and quality of life.       Plan: Continue per plan of care.  Progress treatment as tolerated.       Short Term Goal Expiration Date:(2025)  Long Term Goal Expiration Date: (2025)  POC Expiration Date: (2025)    Visit count: 4 of 10; PN due 2025    POC expires Unit limit Auth Expiration date PT/OT/ST + Visit Limit?   25 6 PT/OT 25 $[id number] CAP/BOMN                           Visit/Unit Tracking  AUTH Status:  Date          Every 10 visits  Used 1 2 3 4 5          Remaining  9 8 7 6 5               Precautions: Hx of stroke, HTN, orthostatic hypotension, T2DM, fall risk       Manuals                                    Neuro Re-Ed         Pt education  Pt education on all neuro testing including oculomotor screening, Tug, FGA, and mCTSIB     Pt education on typical stroke recovery  "and the benefits of therapy for neuroplasticity and function        HIGT  Solo   0.6 mph   0% incline   X 6 minute     Mod verbal cues for R foot clearance and step length   Solo  B/ UE   3# AW on R  0.7 mph   0% incline   X 8 minutes     STS  No UE   2 x 10  No UE   2 x 10  No UE   2 x 10  No UE   2 x 10    Hurdles   Solo   1/4 track   (6) 6\" hurdles   Fwd x 2 laps   Lat x 2 laps   RPE: 5/10 Solo   1/4 track   3# AW on R   (6) 6\" hurdles  Fwd x 2 laps   Lat x 2 laps  Solo   1/4 track   3# AW on R   (6) 6\" hurdles  Fwd x 2 laps   Lat x 2 laps  Solo   1/4 track   (6) 12\" hurdles  Fwd x 2 laps      Blazepods  Solo   1/4 track   4 pods   Fwd/bwd walking  1 min rounds  Round 1: 8 hits   Round 2: 8 hits Solo   1/4 track   3# AW on R  4 pods in 4 corners   (Step with R foot)   1 min round   Round 1: 10 hits   Round 2: 10 hits     Step-ups  Solo   6\" step   No UE   1 x 10 on L     4\" step   2 x 10 on R   RPE: 5/10 Solo   6\" step   3# AW on R   No UE   1 x 10 on L   1 x 4 on R   1 x 10 on R     Dynamic Balance   Solo   1/4 track   3# AW on R   (3) airex   Fwd x 2 laps   Lat x 2 laps     Solo  1/4 track   3# AW on R  Bwd walking   X 2 laps  Solo   1/4 track   3# AW on R   (3) airex   Fwd x 2 laps   Lat x 2 laps     Solo  1/4 track   3# AW on R   Fwd/bwd walking   X 3 laps  Solo  1/4 track   (2) 6\" steps, (1) 8\" step  Fwd x 3 laps     Solo  1/4 track   (Small/med) river rocks)   Fwd x 3 laps   Lat x 1 lap     Solo  1/4 track   (2) NBOS foam beam   Lat x 3 laps   Tandem x 1 lap     Solo  1/4 track   (2) WBOS foam beams   Fwd x 1 lap   Resisted walking    Hallway (100ft)   Purple TB  3# AW on R  Fwd X 2 laps  Hallway (100ft)   Purple TB   3# AW on R   Fwd x 3 laps     Agility ladder    Solo  1/4 track   3# AW on R  Fwd (2 feet in) x 2 laps     Lat (2 feet in, 2 feet out) x 1 lap    Ickey shuffle x 1 lap    Ther Ex        Pt education  Pt education on all testing including 6MWT, 5xSTS, and MMT                                     "                           Ther Activity                        Gait Training                        Modalities

## 2025-02-04 ENCOUNTER — SOCIAL WORK (OUTPATIENT)
Dept: BEHAVIORAL/MENTAL HEALTH CLINIC | Facility: CLINIC | Age: 60
End: 2025-02-04
Payer: MEDICARE

## 2025-02-04 DIAGNOSIS — F31.76 BIPOLAR I DISORDER, MOST RECENT EPISODE DEPRESSED, IN FULL REMISSION (HCC): Primary | ICD-10-CM

## 2025-02-04 DIAGNOSIS — G47.00 INSOMNIA, UNSPECIFIED TYPE: ICD-10-CM

## 2025-02-04 DIAGNOSIS — F41.0 PANIC ATTACKS: ICD-10-CM

## 2025-02-04 DIAGNOSIS — F41.1 GENERALIZED ANXIETY DISORDER: ICD-10-CM

## 2025-02-04 PROCEDURE — 90837 PSYTX W PT 60 MINUTES: CPT | Performed by: SOCIAL WORKER

## 2025-02-04 NOTE — PSYCH
"Behavioral Health Psychotherapy Progress Note    Psychotherapy Provided: Individual Psychotherapy     1. Bipolar I disorder, most recent episode depressed, in full remission (HCC)        2. Generalized anxiety disorder        3. Panic attacks        4. Insomnia, unspecified type            Goals addressed in session: Goal 1     DATA: Orlando claims he is doing well managing his depression and his anxiety. He is visibly recuperating quite well from his prior CVA. He is still rehabing for more function in his right arm and leg but he feels he is progressing nicely. We discussed family issues. His goal is getting back to being able to drive. I provided empathy, support, encouragement and strategies to cope.   During this session, this clinician used the following therapeutic modalities: Client-centered Therapy, Cognitive Behavioral Therapy, Mindfulness-based Strategies, and Supportive Psychotherapy    Substance Abuse was not addressed during this session. If the client is diagnosed with a co-occurring substance use disorder, please indicate any changes in the frequency or amount of use: n/a. Stage of change for addressing substance use diagnoses: No substance use/Not applicable    ASSESSMENT:  Tommie Taylor presents with a Euthymic/ normal mood.     his affect is Normal range and intensity, which is congruent, with his mood and the content of the session. The client has made progress on their goals.     Tommie Talyor presents with a none risk of suicide, none risk of self-harm, and none risk of harm to others.    For any risk assessment that surpasses a \"low\" rating, a safety plan must be developed.    A safety plan was indicated: no  If yes, describe in detail n/a    PLAN: Between sessions, Tommie Taylor will use mindfulness and CBT. At the next session, the therapist will use Client-centered Therapy, Cognitive Behavioral Therapy, Mindfulness-based Strategies, and Supportive Psychotherapy to address issues and " symptoms as they may arise. .    Behavioral Health Treatment Plan and Discharge Planning: Tommie GIACOMO CortezClaudia is aware of and agrees to continue to work on their treatment plan. They have identified and are working toward their discharge goals. yes    Depression Follow-up Plan Completed: Not applicable    Visit start and stop times:    02/04/25  Start Time: 1400  Stop Time: 1500  Total Visit Time: 60 minutes

## 2025-02-05 ENCOUNTER — OFFICE VISIT (OUTPATIENT)
Dept: MULTI SPECIALTY CLINIC | Facility: CLINIC | Age: 60
End: 2025-02-05

## 2025-02-05 ENCOUNTER — OFFICE VISIT (OUTPATIENT)
Dept: INTERNAL MEDICINE CLINIC | Facility: CLINIC | Age: 60
End: 2025-02-05

## 2025-02-05 VITALS
SYSTOLIC BLOOD PRESSURE: 131 MMHG | BODY MASS INDEX: 43.09 KG/M2 | HEART RATE: 65 BPM | DIASTOLIC BLOOD PRESSURE: 80 MMHG | WEIGHT: 267 LBS | TEMPERATURE: 97.7 F

## 2025-02-05 VITALS
WEIGHT: 267 LBS | TEMPERATURE: 97.7 F | DIASTOLIC BLOOD PRESSURE: 70 MMHG | BODY MASS INDEX: 43.09 KG/M2 | HEART RATE: 65 BPM | SYSTOLIC BLOOD PRESSURE: 108 MMHG

## 2025-02-05 DIAGNOSIS — D63.1 ANEMIA DUE TO CHRONIC KIDNEY DISEASE, ON CHRONIC DIALYSIS (HCC): ICD-10-CM

## 2025-02-05 DIAGNOSIS — M89.9 CHRONIC KIDNEY DISEASE-MINERAL AND BONE DISORDER (CKD-MBD): ICD-10-CM

## 2025-02-05 DIAGNOSIS — I63.512 CEREBROVASCULAR ACCIDENT (CVA) DUE TO OCCLUSION OF LEFT MIDDLE CEREBRAL ARTERY (HCC): ICD-10-CM

## 2025-02-05 DIAGNOSIS — I10 PRIMARY HYPERTENSION: ICD-10-CM

## 2025-02-05 DIAGNOSIS — I12.9 TYPE 2 DIABETES MELLITUS WITH STAGE 4 CHRONIC KIDNEY DISEASE AND HYPERTENSION (HCC): Primary | ICD-10-CM

## 2025-02-05 DIAGNOSIS — E11.65 TYPE 2 DIABETES MELLITUS WITH HYPERGLYCEMIA, UNSPECIFIED WHETHER LONG TERM INSULIN USE (HCC): ICD-10-CM

## 2025-02-05 DIAGNOSIS — I63.9 CVA (CEREBRAL VASCULAR ACCIDENT) (HCC): ICD-10-CM

## 2025-02-05 DIAGNOSIS — Z09 HOSPITAL DISCHARGE FOLLOW-UP: ICD-10-CM

## 2025-02-05 DIAGNOSIS — E83.9 CHRONIC KIDNEY DISEASE-MINERAL AND BONE DISORDER (CKD-MBD): ICD-10-CM

## 2025-02-05 DIAGNOSIS — E66.813 CLASS 3 SEVERE OBESITY DUE TO EXCESS CALORIES WITH SERIOUS COMORBIDITY AND BODY MASS INDEX (BMI) OF 40.0 TO 44.9 IN ADULT (HCC): ICD-10-CM

## 2025-02-05 DIAGNOSIS — N18.4 TYPE 2 DIABETES MELLITUS WITH STAGE 4 CHRONIC KIDNEY DISEASE AND HYPERTENSION (HCC): Primary | ICD-10-CM

## 2025-02-05 DIAGNOSIS — N18.6 ANEMIA DUE TO CHRONIC KIDNEY DISEASE, ON CHRONIC DIALYSIS (HCC): ICD-10-CM

## 2025-02-05 DIAGNOSIS — N18.9 CHRONIC KIDNEY DISEASE-MINERAL AND BONE DISORDER (CKD-MBD): ICD-10-CM

## 2025-02-05 DIAGNOSIS — E11.22 TYPE 2 DIABETES MELLITUS WITH STAGE 4 CHRONIC KIDNEY DISEASE AND HYPERTENSION (HCC): Primary | ICD-10-CM

## 2025-02-05 DIAGNOSIS — Z99.2 ANEMIA DUE TO CHRONIC KIDNEY DISEASE, ON CHRONIC DIALYSIS (HCC): ICD-10-CM

## 2025-02-05 DIAGNOSIS — F31.5 BIPOLAR DISORDER, CURRENT EPISODE DEPRESSED, SEVERE, WITH PSYCHOTIC FEATURES (HCC): ICD-10-CM

## 2025-02-05 DIAGNOSIS — E66.01 CLASS 3 SEVERE OBESITY DUE TO EXCESS CALORIES WITH SERIOUS COMORBIDITY AND BODY MASS INDEX (BMI) OF 40.0 TO 44.9 IN ADULT (HCC): ICD-10-CM

## 2025-02-05 LAB — SL AMB POCT HEMOGLOBIN AIC: 6.5 (ref ?–6.5)

## 2025-02-05 PROCEDURE — 99214 OFFICE O/P EST MOD 30 MIN: CPT | Performed by: INTERNAL MEDICINE

## 2025-02-05 PROCEDURE — G2211 COMPLEX E/M VISIT ADD ON: HCPCS | Performed by: INTERNAL MEDICINE

## 2025-02-05 PROCEDURE — 99215 OFFICE O/P EST HI 40 MIN: CPT | Performed by: INTERNAL MEDICINE

## 2025-02-05 PROCEDURE — 83036 HEMOGLOBIN GLYCOSYLATED A1C: CPT | Performed by: INTERNAL MEDICINE

## 2025-02-05 RX ORDER — NIFEDIPINE 90 MG/1
90 TABLET, FILM COATED, EXTENDED RELEASE ORAL DAILY
Qty: 90 TABLET | Refills: 1 | Status: SHIPPED | OUTPATIENT
Start: 2025-02-05

## 2025-02-05 RX ORDER — HYDRALAZINE HYDROCHLORIDE 10 MG/1
10 TABLET, FILM COATED ORAL EVERY 8 HOURS SCHEDULED
Qty: 90 TABLET | Refills: 0 | Status: SHIPPED | OUTPATIENT
Start: 2025-02-05 | End: 2025-02-05 | Stop reason: SDUPTHER

## 2025-02-05 RX ORDER — HYDRALAZINE HYDROCHLORIDE 10 MG/1
10 TABLET, FILM COATED ORAL EVERY 8 HOURS SCHEDULED
Qty: 90 TABLET | Refills: 0 | Status: SHIPPED | OUTPATIENT
Start: 2025-02-05 | End: 2025-02-05 | Stop reason: SINTOL

## 2025-02-05 RX ORDER — ATENOLOL 25 MG/1
25 TABLET ORAL DAILY
Qty: 90 TABLET | Refills: 3 | Status: SHIPPED | OUTPATIENT
Start: 2025-02-05

## 2025-02-05 RX ORDER — ASPIRIN 81 MG/1
81 TABLET ORAL DAILY
Qty: 30 TABLET | Refills: 2 | Status: SHIPPED | OUTPATIENT
Start: 2025-02-05 | End: 2025-05-06

## 2025-02-05 RX ORDER — ATORVASTATIN CALCIUM 20 MG/1
20 TABLET, FILM COATED ORAL DAILY
Qty: 90 TABLET | Refills: 3 | Status: SHIPPED | OUTPATIENT
Start: 2025-02-05

## 2025-02-05 RX ORDER — ASPIRIN 81 MG/1
81 TABLET ORAL DAILY
Qty: 30 TABLET | Refills: 2 | Status: SHIPPED | OUTPATIENT
Start: 2025-02-05 | End: 2025-02-05 | Stop reason: SDUPTHER

## 2025-02-05 RX ORDER — ATORVASTATIN CALCIUM 20 MG/1
20 TABLET, FILM COATED ORAL DAILY
Qty: 90 TABLET | Refills: 3 | Status: SHIPPED | OUTPATIENT
Start: 2025-02-05 | End: 2025-02-05 | Stop reason: SDUPTHER

## 2025-02-05 RX ORDER — ATENOLOL 25 MG/1
25 TABLET ORAL DAILY
Qty: 90 TABLET | Refills: 3 | Status: SHIPPED | OUTPATIENT
Start: 2025-02-05 | End: 2025-02-05 | Stop reason: SDUPTHER

## 2025-02-05 RX ORDER — HUMAN INSULIN 100 [IU]/ML
INJECTION, SUSPENSION SUBCUTANEOUS
Qty: 10 ML | Refills: 6 | Status: SHIPPED | OUTPATIENT
Start: 2025-02-05

## 2025-02-05 RX ORDER — IBUPROFEN 200 MG
TABLET ORAL 2 TIMES DAILY
Qty: 180 EACH | Refills: 3 | Status: SHIPPED | OUTPATIENT
Start: 2025-02-05 | End: 2025-02-14 | Stop reason: SDUPTHER

## 2025-02-05 NOTE — PROGRESS NOTES
"Name: Tommie Taylor      : 1965      MRN: [id number]  Encounter Provider: [facility] ENDOCRINOLOGY  Encounter Date: 2025   Encounter department: [facility] [place]  :  Assessment & Plan  Type 2 diabetes mellitus with stage 4 chronic kidney disease and hypertension (HCC)    Lab Results   Component Value Date    HGBA1C 6.5 2025     Prior A1c 3 months ago 8.6. Patient no longer using insulin or checking blood sugars over past several months. Hgb is low at 10.2 two months ago and it is possible that this A1c is falsely low given the anemia. Will re-order insulin medication with supplies, obtain fructosamine level for more accurate 2-3 week glucose level which we can attempt to compare to A1c. Will need to follow-up in 1 month for diabetes re-check with endocrinology.    Orders:    insulin NPH-insulin regular (NovoLIN 70/30 ReliOn) 100 units/mL subcutaneous injection; Inject 20 Units under the skin daily before breakfast AND 10 Units in the evening. Take before meals. Use 20 units with breakfast and 10 units with dinner.    Insulin Pen Needle 31G X 8 MM MISC; Check sugars three times a day    INSULIN SYRINGE .5CC/29G 29G X 1/2\" 0.5 ML MISC; Use 2 (two) times a day    Fructosamine; Future        History of Present Illness     Tommie Taylor is a 59 y.o. male pmhx cva, dm2, htn presenting to  endocrinology clinic for DM follow-up. No complaints today. Has not taken insulin or check blood glucose for over three months. Has been out of medication. Recently obtained supplies to check blood sugar and now requesting refills of insulin. Pt will be meeting with PCP later today for further management of chronic medical issues.   History obtained from: patient    Review of Systems   Constitutional:  Negative for chills and fever.   HENT:  Negative for ear pain and sore throat.    Eyes:  Negative for pain and visual disturbance.   Respiratory:  Negative for cough and shortness of " breath.    Cardiovascular:  Negative for chest pain and palpitations.   Gastrointestinal:  Negative for abdominal pain and vomiting.   Genitourinary:  Negative for dysuria and hematuria.   Musculoskeletal:  Negative for arthralgias and back pain.   Skin:  Negative for color change and rash.   Neurological:  Negative for seizures and syncope.   All other systems reviewed and are negative.    Medical History Reviewed by provider this encounter:     .     Objective   /80 (BP Location: Left arm, Patient Position: Sitting, Cuff Size: Large)   Pulse 65   Temp 97.7 °F (36.5 °C) (Temporal)   Wt 121 kg (267 lb)   BMI 43.09 kg/m²      Physical Exam  Vitals and nursing note reviewed.   Constitutional:       General: He is not in acute distress.     Appearance: He is well-developed.   HENT:      Head: Normocephalic and atraumatic.   Eyes:      Conjunctiva/sclera: Conjunctivae normal.   Cardiovascular:      Rate and Rhythm: Normal rate.   Abdominal:      Palpations: Abdomen is soft.      Tenderness: There is no abdominal tenderness.   Musculoskeletal:         General: No swelling.      Cervical back: Neck supple.   Skin:     General: Skin is warm and dry.      Capillary Refill: Capillary refill takes less than 2 seconds.   Neurological:      Mental Status: He is alert and oriented to person, place, and time. Mental status is at baseline.   Psychiatric:         Mood and Affect: Mood normal.         Behavior: Behavior normal.         Thought Content: Thought content normal.         Judgment: Judgment normal.

## 2025-02-05 NOTE — PROGRESS NOTES
[facility]  INTERNAL MEDICINE OFFICE VISIT     PATIENT INFORMATION     Tommie Taylor   59 y.o. male   MRN: [id number]    ASSESSMENT/PLAN     Diagnoses and all orders for this visit:    Type 2 diabetes mellitus with stage 4 chronic kidney disease and hypertension (HCC)    Likely inaccurate due to anemia, recheck per Endocrine, 6.5 from 8.6    PLAN:  -     POCT hemoglobin A1c  -     NIFEdipine (ADALAT CC) 90 mg 24 hr tablet; Take 1 tablet (90 mg total) by mouth daily  -     Urinalysis with microscopic; Future  -Continue Novolin 70/30, at 20u am 10u pm per Endocrine  -Check urine ACR, labs pending    CVA (cerebral vascular accident) (HCC)  -     aspirin 81 mg EC tablet; Take 1 tablet (81 mg total) by mouth daily  -Continue statin   -Continue neuro and PT follow up    HTN  -     NIFEdipine (ADALAT CC) 90 mg 24 hr tablet; Take 1 tablet (90 mg total) by mouth daily  - Continue atenalol   -Orthostatic on exam, stop hydralazine (was only taking once daily)        Chronic kidney disease-mineral and bone disorder (CKD-MBD)  Anemia due to chronic kidney disease, on chronic dialysis (HCC)  CKD Stage 4    Reassess proteinuria, although not candidate for ACE/ARB, needs close Nephro follow up for eventual dialysis    -     Iron Panel (Includes Ferritin, Iron Sat%, Iron, and TIBC); Future  -Nephrology follow up, was on iron infusion  -     NIFEdipine (ADALAT CC) 90 mg 24 hr tablet; Take 1 tablet (90 mg total) by mouth daily  -     Discontinue: hydrALAZINE (APRESOLINE) 10 mg tablet; Take 1 tablet (10 mg total) by mouth every 8 (eight) hours  -     Protein / creatinine ratio, urine; Future  -     Cholecalciferol (VITAMIN D3) 1,000 units tablet; Take 2 tablets (2,000 Units total) by mouth daily  -     Urinalysis with microscopic; Future    Cerebrovascular accident (CVA) due to occlusion of left middle cerebral artery (HCC)  -     NIFEdipine (ADALAT CC) 90 mg 24 hr tablet; Take 1 tablet (90 mg  total) by mouth daily    Primary hypertension  -     NIFEdipine (ADALAT CC) 90 mg 24 hr tablet; Take 1 tablet (90 mg total) by mouth daily  -     atenolol (TENORMIN) 25 mg tablet; Take 1 tablet (25 mg total) by mouth daily  -     Discontinue: hydrALAZINE (APRESOLINE) 10 mg tablet; Take 1 tablet (10 mg total) by mouth every 8 (eight) hours  -Hydralazine DC due to him only taking once daily, but also >20mmHg systolic change on orthostatic BP assessment  -Discussed lifestyle modifications, importance of control for stroke prevention        Schedule a follow-up appointment in 6 weeks.    HEALTH MAINTENANCE     Immunization History   Administered Date(s) Administered    COVID-19 PFIZER VACCINE 0.3 ML IM 03/30/2021, 04/24/2021    INFLUENZA 12/11/2014, 10/09/2015, 12/21/2016, 09/28/2017    Influenza Quadrivalent, 6-35 Months IM 12/21/2016    Influenza Recombinant Preservative Free Im 12/05/2024    Influenza, injectable, quadrivalent, preservative free 0.5 mL 11/06/2023    Influenza, recombinant, quadrivalent,injectable, preservative free 12/04/2018, 11/01/2019, 10/23/2020, 01/24/2022, 11/07/2022    Influenza, seasonal, injectable 12/11/2014, 10/09/2015, 09/28/2017    Pneumococcal Polysaccharide PPV23 12/04/2018     CHIEF COMPLAINT     Chief Complaint   Patient presents with    Follow-up    Diabetes     Follow up diabetes      HISTORY OF PRESENT ILLNESS     59 yr old F with PMH CVA of LMCA, HTN, CKD 4 with BMD, T2DM, HLD who presents for BP and diabetes follow up.    Patient takes atenolol and nifedipine, but takes hydralazine once a day instead of 3 times a day. On asa/statin for stroke. Pt admits he does not really know his medications and his girlfriend assists him. Follows and noticed improvement.    Noted to have proteinuria with progressive kidney disease. Not candidate for ACE/ARB due to CKD progression, last nephro visit in Dec 2024. Ok for eventual dialysis but did not want transplant.  Per cardiology visit, needs  to have 2 week zio patch to evaluate for afib. Saw endocrinology today, A1C likely inaccurate due to anemia.  Dose adjusted of insulin 500 to 20/10    MRI- stable recent infarct demonstrated involving the left frontal periventricular/corona radiata white matter with associated cytotoxic edema but no associated hemorrhage. Mild developing wallerian degeneration involving the left corticospinal tract.     REVIEW OF SYSTEMS     Review of Systems   Respiratory:  Negative for shortness of breath.    Cardiovascular:  Negative for chest pain.   Neurological:  Positive for weakness.     OBJECTIVE     Vitals:    02/05/25 1137 02/05/25 1230 02/05/25 1231   BP: 131/80 130/70 108/70   BP Location: Left arm Left arm Left arm   Patient Position: Sitting Sitting Standing   Cuff Size: Large     Pulse: 65     Temp: 97.7 °F (36.5 °C)     TempSrc: Temporal     Weight: 121 kg (267 lb)       Physical Exam  Vitals reviewed.   Constitutional:       General: He is not in acute distress.     Appearance: He is obese.   HENT:      Head: Normocephalic and atraumatic.   Eyes:      Conjunctiva/sclera: Conjunctivae normal.   Cardiovascular:      Rate and Rhythm: Normal rate and regular rhythm.      Pulses: Normal pulses.      Comments: Positive orthostatics  Pulmonary:      Effort: Pulmonary effort is normal.   Abdominal:      Palpations: Abdomen is soft.      Tenderness: There is no abdominal tenderness.   Musculoskeletal:      Comments: R arm contracted, decreased strength   Neurological:      Mental Status: He is alert and oriented to person, place, and time.       CURRENT MEDICATIONS     Current Outpatient Medications:     aspirin 81 mg EC tablet, Take 1 tablet (81 mg total) by mouth daily, Disp: 30 tablet, Rfl: 2    atenolol (TENORMIN) 25 mg tablet, Take 1 tablet (25 mg total) by mouth daily, Disp: 90 tablet, Rfl: 3    atorvastatin (LIPITOR) 20 mg tablet, Take 1 tablet (20 mg total) by mouth daily, Disp: 90 tablet, Rfl: 3    Cholecalciferol  (VITAMIN D3) 1,000 units tablet, Take 2 tablets (2,000 Units total) by mouth daily, Disp: 180 tablet, Rfl: 0    NIFEdipine (ADALAT CC) 90 mg 24 hr tablet, Take 1 tablet (90 mg total) by mouth daily, Disp: 90 tablet, Rfl: 1    acetaminophen (TYLENOL) 325 mg tablet, Take 2 tablets (650 mg total) by mouth every 6 (six) hours as needed for mild pain, fever or headaches (Patient not taking: Reported on 1/6/2025), Disp: , Rfl:     ammonium lactate (LAC-HYDRIN) 12 % cream, Apply topically as needed for dry skin (Patient not taking: Reported on 1/6/2025), Disp: 385 g, Rfl: 0    Blood Glucose Monitoring Suppl (Assure 3 Meter) KIT, Use 1 each 4 (four) times a day Fine to substitute other brand if not covered by insurance, Disp: 1 kit, Rfl: 2    Blood Glucose Monitoring Suppl (Blood Glucose Monitor System) w/Device KIT, Use 3 (three) times a day, Disp: 1 kit, Rfl: 0    Blood Glucose Monitoring Suppl (FREESTYLE LITE) ANTONIA, by Does not apply route 3 (three) times a day, Disp: 1 each, Rfl: 0    Blood Glucose Monitoring Suppl (FreeStyle Lite) w/Device KIT, Use as instructed 4 times a day, Disp: 1 kit, Rfl: 0    calcium carbonate (TUMS) 500 mg chewable tablet, Chew 2 tablets (1,000 mg total) 2 (two) times a day as needed for indigestion or heartburn (Patient not taking: Reported on 1/6/2025), Disp: , Rfl:     ergocalciferol (VITAMIN D2) 50,000 units, Take 1 capsule (50,000 Units total) by mouth 3 (three) times a week (Patient not taking: Reported on 1/28/2025), Disp: 12 capsule, Rfl: 0    glucose blood (FREESTYLE LITE) test strip, CHECK BLOOD SUGARS FOUR TIMES DAILY (Patient not taking: Reported on 1/28/2025), Disp: 400 strip, Rfl: 3    glucose blood test strip, CHECK BLOOD GLUCOSE 3 TIMES DAILY (Patient not taking: Reported on 1/28/2025), Disp: 300 each, Rfl: 3    insulin NPH-insulin regular (NovoLIN 70/30 ReliOn) 100 units/mL subcutaneous injection, Inject 20 Units under the skin daily before breakfast AND 10 Units in the  "evening. Take before meals. Use 20 units with breakfast and 10 units with dinner., Disp: 10 mL, Rfl: 6    Insulin Pen Needle 31G X 8 MM MISC, Check sugars three times a day, Disp: 100 each, Rfl: 1    INSULIN SYRINGE .5CC/29G 29G X 1/2\" 0.5 ML MISC, Use 2 (two) times a day, Disp: 180 each, Rfl: 3    Lancets (freestyle) lancets, Use as instructed, Disp: 300 each, Rfl: 0    Lancets MISC, Use 3 (three) times a day, Disp: 300 each, Rfl: 3    magnesium gluconate (MAGONATE) 500 MG tablet, Take 1 tablet (500 mg total) by mouth daily Do not start before November 16, 2024. (Patient not taking: Reported on 1/28/2025), Disp: 30 tablet, Rfl: 0    PAST MEDICAL & SURGICAL HISTORY     Past Medical History:   Diagnosis Date    ADHD, adult residual type     Anxiety     Anxiety     Bipolar 1 disorder (HCC)     Cataract     Left eye    Chronic kidney disease     Colon polyp     CPAP (continuous positive airway pressure) dependence     Depression     Depression     Diabetes mellitus (HCC)     blood sugar 167 on admission    Equinus deformity of foot     GERD (gastroesophageal reflux disease)     Homicidal ideations     Hyperlipidemia     Hypertension     Microalbuminuria     Morbid obesity (HCC)     Neuropathy     Shortness of breath     Sleep apnea     CPAP at bedtime    Sleep apnea     Stroke (HCC)     Suicidal intent      Past Surgical History:   Procedure Laterality Date    CATARACT EXTRACTION      CATARACT EXTRACTION      COLONOSCOPY      HAND SURGERY Right     OTHER SURGICAL HISTORY      REPAIR OF SUPERFICIAL WOUND FACE    OR ESOPHAGOGASTRODUODENOSCOPY TRANSORAL DIAGNOSTIC N/A 06/14/2018    Procedure: ESOPHAGOGASTRODUODENOSCOPY (EGD);  Surgeon: Yinka Maier MD;  Location: [facility];  Service: Gastroenterology    OR ESOPHAGOGASTRODUODENOSCOPY TRANSORAL DIAGNOSTIC N/A 09/12/2018    Procedure: EGD AND COLONOSCOPY;  Surgeon: Yinka Maier MD;  Location: [facility];  Service: Gastroenterology     SOCIAL & FAMILY HISTORY     Social " "History     Socioeconomic History    Marital status: /Civil Union     Spouse name: Not on file    Number of children: 1    Years of education: Not on file    Highest education level: Not on file   Occupational History    Occupation: On disability   Tobacco Use    Smoking status: Former     Current packs/day: 0.00     Types: Cigarettes     Quit date:      Years since quittin.1    Smokeless tobacco: Current     Types: Chew    Tobacco comments:     pt \"Quit after approx over 25 years ago\"   Vaping Use    Vaping status: Never Used   Substance and Sexual Activity    Alcohol use: Not Currently     Comment: rarely    Drug use: Not Currently     Comment: quit 20 years ago    Sexual activity: Yes     Partners: Female     Birth control/protection: None     Comment: steady girlfriend   Other Topics Concern    Not on file   Social History Narrative     from spouse, technically still  but living with other partner, partner's father, and early 2019, his partner's daughter and boyfriend moved in with their two children.  Then the boyfriend moved out in 2019.   Then partner's daughter moved out approx 2021.  (Pt's partner has guardianship of the grandchildren per Pt).        Children: 1 stepdaughter         Education:    Pt denies any hx of learning disabilities and reached childhood milestones on time as far as he knows.  He wore braces for equinovarus but states he did not suffer delay in walking    Graduated High School --he was held back 3 times because \"I was just lazy, didn't do the work.\"    No college    Took some core classes in [employer] and worked as a volunteer  from approx [phone]             Substance Abuse History:     Pt drinks ETOH approx q 3-4 months but denies any h/o ETOH abuse.    Pt tried smoking Crack once in the past and has been smoking THC once q 2-3 months since 14y/o.     He denies other drug use, IVDA, addictions or drug abuse.         Social Drivers " of Health     Financial Resource Strain: Low Risk  (1/14/2025)    Overall Financial Resource Strain (CARDIA)     Difficulty of Paying Living Expenses: Not hard at all   Food Insecurity: No Food Insecurity (1/14/2025)    Hunger Vital Sign     Worried About Running Out of Food in the Last Year: Never true     Ran Out of Food in the Last Year: Never true   Transportation Needs: No Transportation Needs (1/14/2025)    PRAPARE - Transportation     Lack of Transportation (Medical): No     Lack of Transportation (Non-Medical): No   Physical Activity: Inactive (9/14/2021)    Exercise Vital Sign     Days of Exercise per Week: 0 days     Minutes of Exercise per Session: 0 min   Stress: No Stress Concern Present (9/14/2021)    [employer] - Occupational Stress Questionnaire     Feeling of Stress : Not at all   Social Connections: Moderately Isolated (9/14/2021)    Social Connection and Isolation Panel [NHANES]     Frequency of Communication with Friends and Family: More than three times a week     Frequency of Social Gatherings with Friends and Family: Once a week     Attends Restoration Services: Never     Active Member of Clubs or Organizations: No     Attends Club or Organization Meetings: Never     Marital Status: Living with partner   Intimate Partner Violence: Unknown (11/18/2024)    Nursing IPS     Feels Physically and Emotionally Safe: Not on file     Physically Hurt by Someone: Not on file     Humiliated or Emotionally Abused by Someone: Not on file     Physically Hurt by Someone: No     Hurt or Threatened by Someone: No   Housing Stability: Low Risk  (1/14/2025)    Housing Stability Vital Sign     Unable to Pay for Housing in the Last Year: No     Number of Times Moved in the Last Year: 1     Homeless in the Last Year: No     Social History     Substance and Sexual Activity   Alcohol Use Not Currently    Comment: rarely       Social History     Substance and Sexual Activity   Drug Use Not  "Currently    Comment: quit 20 years ago     Social History     Tobacco Use   Smoking Status Former    Current packs/day: 0.00    Types: Cigarettes    Quit date:     Years since quittin.1   Smokeless Tobacco Current    Types: Chew   Tobacco Comments    pt \"Quit after approx over 25 years ago\"     Family History   Problem Relation Age of Onset    Diabetes Mother     Alcohol abuse Father     Diabetes Father     Hypertension Father     Lung cancer Father     Stroke Father     Cancer Father         lung    Alcohol abuse Sister     Depression Sister     Lymphoma Sister     Alcohol abuse Family     Alcohol abuse Sister     Alcohol abuse Sister     Cancer Sister     Heart disease Neg Hx     Thyroid disease Neg Hx                ==  Mali Traylor MD  PGY-3  [facility]    [place] [address] [address]  [place] [address]  Office: [phone]  Fax: [phone]          "

## 2025-02-05 NOTE — PATIENT INSTRUCTIONS
Novolin 20U am; 10U pm take blood sugars before getting insulin  Follow-up with endo in 1 month bring you blood logs when come back

## 2025-02-05 NOTE — ASSESSMENT & PLAN NOTE
"  Lab Results   Component Value Date    HGBA1C 6.5 02/05/2025     Prior A1c 3 months ago 8.6. Patient no longer using insulin or checking blood sugars over past several months. Hgb is low at 10.2 two months ago and it is possible that this A1c is falsely low given the anemia. Will re-order insulin medication with supplies, obtain fructosamine level for more accurate 2-3 week glucose level which we can attempt to compare to A1c. Will need to follow-up in 1 month for diabetes re-check with endocrinology.    Orders:    insulin NPH-insulin regular (NovoLIN 70/30 ReliOn) 100 units/mL subcutaneous injection; Inject 20 Units under the skin daily before breakfast AND 10 Units in the evening. Take before meals. Use 20 units with breakfast and 10 units with dinner.    Insulin Pen Needle 31G X 8 MM MISC; Check sugars three times a day    INSULIN SYRINGE .5CC/29G 29G X 1/2\" 0.5 ML MISC; Use 2 (two) times a day    Fructosamine; Future    "

## 2025-02-06 ENCOUNTER — PATIENT OUTREACH (OUTPATIENT)
Dept: INTERNAL MEDICINE CLINIC | Facility: CLINIC | Age: 60
End: 2025-02-06

## 2025-02-06 NOTE — PROGRESS NOTES
Progress Note     Today's date: 2025  Patient name: Tommie Taylor  : 1965  MRN: [id number]  Referring provider: Harshad Romo*  Dx:   Encounter Diagnosis     ICD-10-CM    1. History of CVA (cerebrovascular accident)  Z86.73       2. Weakness of right arm  R29.898           Start Time: 1355  Stop Time: 1445  Total time in clinic (min): 50 minutes    Assessment: Pt tolerated the session well today. Performed progress update this session to assess progress towards short term goals at [facility] point in plan of care. Pt has met 4/ STG and / LTG at this time demonstrating excellent progress. Pt has improved by being able to complete the 6MWT this session due to inability to complete at IE and was able to walk 950 ft without an AD indicating improved aerobic capacity and activity tolerance. Pts gait speed has improved from 0.81 m/s to 0.93 m/s indicating pt is a community ambulator however is still at risk for falls based on their speed being < 1 m/s. Pts TUG time has improved from 16.57 s to 8.50 s indicating pt is at a decreased risk for falls based on this test as socres > 13.5 s are considered at risk. Pt's FGA score has significantly improved from 15/30 to 23/30 reducing pts fall risk as scores < 23 are considered at risk for falls and indicating improved dynamic balance overall. Pts 5x STS tests has improved from 13 s to 9.75 s indicating improved LE strength and power. Pt also demonstrated improved balance with mCTSIB with decreased sway indicating improved sensory integration and vestibular cueing for balance. Pt would continue to benefit from skilled physical therapy 2x/wk to improve overall QOL inclusive of, strength, coordination, balance, endurance and functional mobility in the home and in the community as well as reduce their fall risk for improved safety.     NEW GOALS (25):  - Patient will improve R hip flexion MMT to at least 4/5 for improved LE strength within 4 weeks.    -  Patient will be able to improve FGA by 4 points or greater for improved dynamic balance and decreased fall risk within 4 weeks.   - Patient will be able to negotiate stairs with reciprocal pattern and no UE support within 4 weeks for improved balance.     Goals  STG:  - Patient will be able to complete a 6MWT demonstrating significant improvements in endurance and aerobic capcity within 4 weeks. MET  - Patient will be independent with initial HEP within 4 weeks to demonstrate improve exercise compliance at home. MET  - Patient will improve TUG time without AD by 4 sec or more within 4 weeks to demonstrate decreased fall risk. MET  - Patient will improve FGA score by 4 points or greater within 4 weeks demonstrating improved dynamic balance and decreased fall risk. MET    LTG:  - Patient will improve 6MWT by 400 ft demonstrating significant improvements in endurance and aerobic capcity within 8 weeks. Progressing  - Patient will demonstrated improved LE strength and power with an improved 5xSTS time of 10s or less without UE within 8 weeks. MET  - Patient will perform TUG in 13 sec or less without AD within 8 weeks to demonstrate decreased fall risk. MET  - Patient will improve FGA to 22/30 or greater indicating they are no longer at higher risk for falls within 8 weeks. MET  - Patient will be independent with progressed HEP within 8 weeks.   - Patient will improve gait speed to 1.0 m/s indicating they are no longer at high risk for falls within 8 weeks.  - Patient will be able to negotiate stairs with a reciprocal gait pattern with use of handrail within 8 weeks. MET    Plan  Patient would benefit from: skilled physical therapy and OT eval  Referral necessary: Yes    Planned therapy interventions: ADL training, balance, balance/weight bearing training, body mechanics training, coordination, flexibility, functional ROM exercises, gait training, graded activity, graded exercise, home exercise program, transfer  training, therapeutic training, therapeutic exercise, therapeutic activities, stretching, strengthening, sensory integrative techniques, patient/caregiver education, neuromuscular re-education and motor coordination training    Frequency: 2x week  Duration in weeks: 8  Plan of Care beginning date: 1/8/2025  Plan of Care expiration date: 3/5/2025  Treatment plan discussed with: patient and family      Subjective: Feeling really good.    Patient pain: 0/10  Patient goals: walking speed (because I notice it when I walk out to the garage)     Objective: See treatment diary below    Balance Test 01/08 02/07   6 Minute Walk Test (ft): 300 ft, 2:10 left, no AD   HR: 75 bpm   O2: 99% 950 ft, no AD   HR: 79 bpm   O2: 99%    Gait Speed (ft/s): 0.81 m/s 0.93 m/s    5x Sit To Stand (s): 13s, No UE  9.75s, no UE    TUG 16.57s 8.50s   FGA 15/30 23/30       MCTSIB Number of Seconds (01/08) 02/07   Feet Together, Eyes Open 30 sec  30 sec   Feet Together, Eyes Closed 30 sec 30 sec   Feet Together, Eyes Open Foam 30 sec, mild sway 30 sec    Feet Together, Eyes Closed Foam 30 sec, mod sway 30 sec, mild sway      Manual Muscle Testing - Hip Right Left 02/07   Flexion 3/5 3+/5 R: 3+/5  L: 4/5     Manual Muscle Testing - Knee Right Left  02/07   Flexion 4/5 5/5 R: 4/5  L: 5/5   Extension 4/5 5/5 R: 5/5  L: 5/5     Manual Muscle Testing - Ankle Right Left  02/07   Doriflexion 4/5 5/5 R: 4+/5  L: 5/5          Short Term Goal Expiration Date:(02/05/2025)  Long Term Goal Expiration Date: (03/05/2025)  POC Expiration Date: (03/05/2025)    Visit count: 6 of 10; PN due 03/05/2025    POC expires Unit limit Auth Expiration date PT/OT/ST + Visit Limit?   03/05/25 6 PT/OT 12/31/25 $[id number] CAP/BOMN                           Visit/Unit Tracking  AUTH Status:  Date 01/08 01/13 01/20 01/31 02/03 02/07        Every 10 visits  Used 1 2 3 4 5 6         Remaining  9 8 7 6 5 4              Precautions: Hx of stroke, HTN, orthostatic hypotension, T2DM, fall  "risk       Manuals 02/07 01/13 01/20 01/31 02/03                                   Neuro Re-Ed         Pt education  Pt education on FGA, mCTSIB, TUG, and their relation to fall risk        HIGT Solo  1.0 mph   0% incline   4# AW on R   X 8 min      Solo   0.6 mph   0% incline   X 6 minute     Mod verbal cues for R foot clearance and step length   Solo  B/ UE   3# AW on R  0.7 mph   0% incline   X 8 minutes     STS  No UE   2 x 10  No UE   2 x 10  No UE   2 x 10  No UE   2 x 10    Hurdles   Solo   1/4 track   (6) 6\" hurdles   Fwd x 2 laps   Lat x 2 laps   RPE: 5/10 Solo   1/4 track   3# AW on R   (6) 6\" hurdles  Fwd x 2 laps   Lat x 2 laps  Solo   1/4 track   3# AW on R   (6) 6\" hurdles  Fwd x 2 laps   Lat x 2 laps  Solo   1/4 track   (6) 12\" hurdles  Fwd x 2 laps      Blazepods  Solo   1/4 track   4 pods   Fwd/bwd walking  1 min rounds  Round 1: 8 hits   Round 2: 8 hits Solo   1/4 track   3# AW on R  4 pods in 4 corners   (Step with R foot)   1 min round   Round 1: 10 hits   Round 2: 10 hits     Step-ups Solo  6\" step 4# AW on R   Fwd x 10 each leg  Solo   6\" step   No UE   1 x 10 on L     4\" step   2 x 10 on R   RPE: 5/10 Solo   6\" step   3# AW on R   No UE   1 x 10 on L   1 x 4 on R   1 x 10 on R     Dynamic Balance   Solo   1/4 track   3# AW on R   (3) airex   Fwd x 2 laps   Lat x 2 laps     Solo  1/4 track   3# AW on R  Bwd walking   X 2 laps  Solo   1/4 track   3# AW on R   (3) airex   Fwd x 2 laps   Lat x 2 laps     Solo  1/4 track   3# AW on R   Fwd/bwd walking   X 3 laps  Solo  1/4 track   (2) 6\" steps, (1) 8\" step  Fwd x 3 laps     Solo  1/4 track   (Small/med) river rocks)   Fwd x 3 laps   Lat x 1 lap     Solo  1/4 track   (2) NBOS foam beam   Lat x 3 laps   Tandem x 1 lap     Solo  1/4 track   (2) WBOS foam beams   Fwd x 1 lap   Resisted walking    Hallway (100ft)   Purple TB  3# AW on R  Fwd X 2 laps  Hallway (100ft)   Purple TB   3# AW on R   Fwd x 3 laps     Agility ladder    Solo  1/4 track   3# AW " on R  Fwd (2 feet in) x 2 laps     Lat (2 feet in, 2 feet out) x 1 lap    Ickey shuffle x 1 lap    Ther Ex        Pt education  Pt education on 6MWT, 5xSTS, MMT                                                                Ther Activity                        Gait Training                        Modalities

## 2025-02-07 ENCOUNTER — OFFICE VISIT (OUTPATIENT)
Dept: OCCUPATIONAL THERAPY | Facility: CLINIC | Age: 60
End: 2025-02-07
Payer: MEDICARE

## 2025-02-07 ENCOUNTER — EVALUATION (OUTPATIENT)
Dept: PHYSICAL THERAPY | Facility: CLINIC | Age: 60
End: 2025-02-07
Payer: MEDICARE

## 2025-02-07 DIAGNOSIS — R29.898 WEAKNESS OF RIGHT ARM: ICD-10-CM

## 2025-02-07 DIAGNOSIS — Z86.73 HISTORY OF CVA (CEREBROVASCULAR ACCIDENT): Primary | ICD-10-CM

## 2025-02-07 PROCEDURE — 97150 GROUP THERAPEUTIC PROCEDURES: CPT

## 2025-02-07 PROCEDURE — 97110 THERAPEUTIC EXERCISES: CPT

## 2025-02-07 PROCEDURE — 97112 NEUROMUSCULAR REEDUCATION: CPT

## 2025-02-07 NOTE — PROGRESS NOTES
Occupational Therapy Daily Note:    Today's date: 2025  Patient name: Tommie Taylor  : 1965  MRN: [id number]  Referring provider: Harshad Romo*  Dx:   Encounter Diagnoses   Name Primary?    History of CVA (cerebrovascular accident) Yes    Weakness of right arm                       POC expires Unit limit Auth Expiration date PT/OT/ST + Visit Limit?   3/27/25 N/A 25 BOMN                           Visit/Unit Tracking  AUTH Status:  Date            BOMN Used 1 2 3            Remaining  7 6 5                Duration in weeks: 8 weeks  Plan of care beginning date: 25  Plan of care expiration date: 3/27/25  PN #1 due: 25    Precautions: CVA, HTN, high BP, Orthostatic       Subjective:   No new changes.    Objective: See treatment below.     Thera Ex: In supine, provided manual mobilization of gentle rotational movement to GH joint for pain reduction.      Donned air cast to elbow and 1 lb wrist weight performing shoulder flexion <> extension for concentric and eccentric control of RUE with OT stabilizing arm during movement.      Supine elbow curls with 1 lb wrist weight with arm supported by pillow.      Seated, placed e-stim to forearm/digit extensor mass with 10 sec on/10 sec off, in stance facilitated weight bearing into modified push-ups.      Standing UE ranger into pushes and pulls.     Concluded with using resistive tongs (placed 2 rubber bands around) to R hand performing digit flexion/extension with tong providing proprioceptive feedback to open hand.       Assessment: Tolerated treatment well. Patient would benefit from continued skilled OT.  Pt benefit from air cast, reporting no pain during shoulder ROM.  Pt demo pain to 90* ROM, shoulder ER, and overall neutral positioning of RUE 2/2 tone and/or limited use of RUE.  Pt limited with grasp of tongs in R hand, able to demo slight movement.     Plan: Continued skilled OT per POC    TIME   245-315  1/1  315-330 G

## 2025-02-10 ENCOUNTER — APPOINTMENT (OUTPATIENT)
Dept: CT IMAGING | Facility: HOSPITAL | Age: 60
DRG: 092 | End: 2025-02-10
Payer: MEDICARE

## 2025-02-10 ENCOUNTER — HOSPITAL ENCOUNTER (INPATIENT)
Facility: HOSPITAL | Age: 60
LOS: 1 days | Discharge: HOME | DRG: 092 | End: 2025-02-11
Attending: EMERGENCY MEDICINE | Admitting: INTERNAL MEDICINE
Payer: MEDICARE

## 2025-02-10 DIAGNOSIS — D64.9 SYMPTOMATIC ANEMIA: ICD-10-CM

## 2025-02-10 DIAGNOSIS — E87.6 HYPOKALEMIA: ICD-10-CM

## 2025-02-10 DIAGNOSIS — R29.90 STROKE-LIKE SYMPTOMS: Primary | ICD-10-CM

## 2025-02-10 PROBLEM — E83.51 HYPOCALCEMIA: Status: ACTIVE | Noted: 2025-02-10

## 2025-02-10 PROBLEM — E11.9 TYPE 2 DIABETES MELLITUS (HCC): Status: ACTIVE | Noted: 2024-10-08

## 2025-02-10 LAB
2HR DELTA HS TROPONIN: 0 NG/L
4HR DELTA HS TROPONIN: 3 NG/L
ABO GROUP BLD BPU: NORMAL
ABO GROUP BLD: NORMAL
ABO GROUP BLD: NORMAL
ALBUMIN SERPL BCG-MCNC: 3 G/DL (ref 3.5–5)
ANION GAP SERPL CALCULATED.3IONS-SCNC: 7 MMOL/L (ref 4–13)
APTT PPP: 29 SECONDS (ref 23–34)
BLD GP AB SCN SERPL QL: NEGATIVE
BPU ID: NORMAL
BUN SERPL-MCNC: 16 MG/DL (ref 5–25)
CA-I BLD-SCNC: 0.97 MMOL/L (ref 1.12–1.32)
CALCIUM SERPL-MCNC: 6.3 MG/DL (ref 8.4–10.2)
CARDIAC TROPONIN I PNL SERPL HS: 5 NG/L (ref ?–50)
CARDIAC TROPONIN I PNL SERPL HS: 5 NG/L (ref ?–50)
CARDIAC TROPONIN I PNL SERPL HS: 8 NG/L (ref ?–50)
CHLORIDE SERPL-SCNC: 118 MMOL/L (ref 96–108)
CHOLEST SERPL-MCNC: 129 MG/DL (ref ?–200)
CO2 SERPL-SCNC: 16 MMOL/L (ref 21–32)
CREAT SERPL-MCNC: 2.27 MG/DL (ref 0.6–1.3)
CROSSMATCH: NORMAL
ERYTHROCYTE [DISTWIDTH] IN BLOOD BY AUTOMATED COUNT: 14 % (ref 11.6–15.1)
ETHANOL SERPL-MCNC: <10 MG/DL
FLUAV RNA RESP QL NAA+PROBE: NEGATIVE
FLUBV RNA RESP QL NAA+PROBE: NEGATIVE
GFR SERPL CREATININE-BSD FRML MDRD: 30 ML/MIN/1.73SQ M
GLUCOSE SERPL-MCNC: 137 MG/DL (ref 65–140)
GLUCOSE SERPL-MCNC: 150 MG/DL (ref 65–140)
GLUCOSE SERPL-MCNC: 159 MG/DL (ref 65–140)
HCT VFR BLD AUTO: 22.4 % (ref 36.5–49.3)
HDLC SERPL-MCNC: 35 MG/DL
HGB BLD-MCNC: 7.4 G/DL (ref 12–17)
INR PPP: 1.31 (ref 0.85–1.19)
LDLC SERPL CALC-MCNC: 76 MG/DL (ref 0–100)
MAGNESIUM SERPL-MCNC: 1.2 MG/DL (ref 1.9–2.7)
MCH RBC QN AUTO: 25.9 PG (ref 26.8–34.3)
MCHC RBC AUTO-ENTMCNC: 33 G/DL (ref 31.4–37.4)
MCV RBC AUTO: 78 FL (ref 82–98)
NONHDLC SERPL-MCNC: 94 MG/DL
PLATELET # BLD AUTO: 123 THOUSANDS/UL (ref 149–390)
PMV BLD AUTO: 9.6 FL (ref 8.9–12.7)
POTASSIUM SERPL-SCNC: 2.5 MMOL/L (ref 3.5–5.3)
PROTHROMBIN TIME: 17.1 SECONDS (ref 12.3–15)
RBC # BLD AUTO: 2.86 MILLION/UL (ref 3.88–5.62)
RH BLD: POSITIVE
RH BLD: POSITIVE
RSV RNA RESP QL NAA+PROBE: NEGATIVE
SARS-COV-2 RNA RESP QL NAA+PROBE: NEGATIVE
SODIUM SERPL-SCNC: 141 MMOL/L (ref 135–147)
SPECIMEN EXPIRATION DATE: NORMAL
TRIGL SERPL-MCNC: 89 MG/DL (ref ?–150)
TSH SERPL DL<=0.05 MIU/L-ACNC: 2.29 UIU/ML (ref 0.45–4.5)
UNIT DISPENSE STATUS: NORMAL
UNIT PRODUCT CODE: NORMAL
UNIT PRODUCT VOLUME: 350 ML
UNIT RH: NORMAL
WBC # BLD AUTO: 5.22 THOUSAND/UL (ref 4.31–10.16)

## 2025-02-10 PROCEDURE — 82077 ASSAY SPEC XCP UR&BREATH IA: CPT

## 2025-02-10 PROCEDURE — 96365 THER/PROPH/DIAG IV INF INIT: CPT

## 2025-02-10 PROCEDURE — 70496 CT ANGIOGRAPHY HEAD: CPT

## 2025-02-10 PROCEDURE — 80061 LIPID PANEL: CPT

## 2025-02-10 PROCEDURE — 83735 ASSAY OF MAGNESIUM: CPT

## 2025-02-10 PROCEDURE — 86901 BLOOD TYPING SEROLOGIC RH(D): CPT

## 2025-02-10 PROCEDURE — 82040 ASSAY OF SERUM ALBUMIN: CPT

## 2025-02-10 PROCEDURE — 82948 REAGENT STRIP/BLOOD GLUCOSE: CPT

## 2025-02-10 PROCEDURE — 85610 PROTHROMBIN TIME: CPT

## 2025-02-10 PROCEDURE — 83036 HEMOGLOBIN GLYCOSYLATED A1C: CPT

## 2025-02-10 PROCEDURE — 85027 COMPLETE CBC AUTOMATED: CPT

## 2025-02-10 PROCEDURE — 99223 1ST HOSP IP/OBS HIGH 75: CPT | Performed by: INTERNAL MEDICINE

## 2025-02-10 PROCEDURE — 80048 BASIC METABOLIC PNL TOTAL CA: CPT

## 2025-02-10 PROCEDURE — 93005 ELECTROCARDIOGRAM TRACING: CPT

## 2025-02-10 PROCEDURE — 84132 ASSAY OF SERUM POTASSIUM: CPT | Performed by: INTERNAL MEDICINE

## 2025-02-10 PROCEDURE — 86923 COMPATIBILITY TEST ELECTRIC: CPT

## 2025-02-10 PROCEDURE — 85730 THROMBOPLASTIN TIME PARTIAL: CPT

## 2025-02-10 PROCEDURE — 84443 ASSAY THYROID STIM HORMONE: CPT

## 2025-02-10 PROCEDURE — 99285 EMERGENCY DEPT VISIT HI MDM: CPT

## 2025-02-10 PROCEDURE — 30233N1 TRANSFUSION OF NONAUTOLOGOUS RED BLOOD CELLS INTO PERIPHERAL VEIN, PERCUTANEOUS APPROACH: ICD-10-PCS | Performed by: INTERNAL MEDICINE

## 2025-02-10 PROCEDURE — 99291 CRITICAL CARE FIRST HOUR: CPT | Performed by: EMERGENCY MEDICINE

## 2025-02-10 PROCEDURE — 0241U HB NFCT DS VIR RESP RNA 4 TRGT: CPT | Performed by: EMERGENCY MEDICINE

## 2025-02-10 PROCEDURE — 86850 RBC ANTIBODY SCREEN: CPT

## 2025-02-10 PROCEDURE — 99291 CRITICAL CARE FIRST HOUR: CPT | Performed by: STUDENT IN AN ORGANIZED HEALTH CARE EDUCATION/TRAINING PROGRAM

## 2025-02-10 PROCEDURE — 36415 COLL VENOUS BLD VENIPUNCTURE: CPT

## 2025-02-10 PROCEDURE — 70498 CT ANGIOGRAPHY NECK: CPT

## 2025-02-10 PROCEDURE — 84484 ASSAY OF TROPONIN QUANT: CPT

## 2025-02-10 PROCEDURE — 82330 ASSAY OF CALCIUM: CPT

## 2025-02-10 PROCEDURE — 86900 BLOOD TYPING SEROLOGIC ABO: CPT

## 2025-02-10 RX ORDER — ATORVASTATIN CALCIUM 20 MG/1
20 TABLET, FILM COATED ORAL DAILY
Status: DISCONTINUED | OUTPATIENT
Start: 2025-02-11 | End: 2025-02-11 | Stop reason: HOSPADM

## 2025-02-10 RX ORDER — CALCIUM GLUCONATE 20 MG/ML
2 INJECTION, SOLUTION INTRAVENOUS ONCE
Status: COMPLETED | OUTPATIENT
Start: 2025-02-10 | End: 2025-02-10

## 2025-02-10 RX ORDER — POTASSIUM CHLORIDE 14.9 MG/ML
20 INJECTION INTRAVENOUS
Status: COMPLETED | OUTPATIENT
Start: 2025-02-10 | End: 2025-02-11

## 2025-02-10 RX ORDER — PANTOPRAZOLE SODIUM 40 MG/1
40 TABLET, DELAYED RELEASE ORAL
Status: DISCONTINUED | OUTPATIENT
Start: 2025-02-11 | End: 2025-02-11 | Stop reason: HOSPADM

## 2025-02-10 RX ORDER — POTASSIUM CHLORIDE 14.9 MG/ML
20 INJECTION INTRAVENOUS ONCE
Status: COMPLETED | OUTPATIENT
Start: 2025-02-10 | End: 2025-02-10

## 2025-02-10 RX ORDER — INSULIN LISPRO 100 [IU]/ML
2-12 INJECTION, SOLUTION INTRAVENOUS; SUBCUTANEOUS
Status: DISCONTINUED | OUTPATIENT
Start: 2025-02-10 | End: 2025-02-11 | Stop reason: HOSPADM

## 2025-02-10 RX ORDER — NIFEDIPINE 30 MG/1
90 TABLET, EXTENDED RELEASE ORAL DAILY
Status: DISCONTINUED | OUTPATIENT
Start: 2025-02-11 | End: 2025-02-11 | Stop reason: HOSPADM

## 2025-02-10 RX ORDER — ERGOCALCIFEROL 1.25 MG/1
50000 CAPSULE, LIQUID FILLED ORAL 3 TIMES WEEKLY
Status: DISCONTINUED | OUTPATIENT
Start: 2025-02-12 | End: 2025-02-11 | Stop reason: HOSPADM

## 2025-02-10 RX ORDER — ACETAMINOPHEN 325 MG/1
975 TABLET ORAL ONCE
Status: COMPLETED | OUTPATIENT
Start: 2025-02-10 | End: 2025-02-10

## 2025-02-10 RX ORDER — HYDRALAZINE HYDROCHLORIDE 10 MG/1
10 TABLET, FILM COATED ORAL 3 TIMES DAILY
Status: DISCONTINUED | OUTPATIENT
Start: 2025-02-10 | End: 2025-02-11 | Stop reason: HOSPADM

## 2025-02-10 RX ORDER — ONDANSETRON 2 MG/ML
1 INJECTION INTRAMUSCULAR; INTRAVENOUS ONCE
Status: COMPLETED | OUTPATIENT
Start: 2025-02-10 | End: 2025-02-10

## 2025-02-10 RX ORDER — INSULIN LISPRO 100 [IU]/ML
2-12 INJECTION, SOLUTION INTRAVENOUS; SUBCUTANEOUS
Status: DISCONTINUED | OUTPATIENT
Start: 2025-02-11 | End: 2025-02-11 | Stop reason: HOSPADM

## 2025-02-10 RX ORDER — ATENOLOL 25 MG/1
25 TABLET ORAL DAILY
Status: DISCONTINUED | OUTPATIENT
Start: 2025-02-11 | End: 2025-02-11 | Stop reason: HOSPADM

## 2025-02-10 RX ORDER — ASPIRIN 81 MG/1
81 TABLET ORAL DAILY
Status: DISCONTINUED | OUTPATIENT
Start: 2025-02-11 | End: 2025-02-11 | Stop reason: HOSPADM

## 2025-02-10 RX ORDER — ACETAMINOPHEN 325 MG/1
650 TABLET ORAL EVERY 6 HOURS PRN
Status: DISCONTINUED | OUTPATIENT
Start: 2025-02-10 | End: 2025-02-11 | Stop reason: HOSPADM

## 2025-02-10 RX ORDER — POTASSIUM CHLORIDE 1500 MG/1
20 TABLET, EXTENDED RELEASE ORAL ONCE
Status: COMPLETED | OUTPATIENT
Start: 2025-02-10 | End: 2025-02-10

## 2025-02-10 RX ORDER — ASPIRIN 325 MG
325 TABLET ORAL ONCE
Status: COMPLETED | OUTPATIENT
Start: 2025-02-10 | End: 2025-02-10

## 2025-02-10 RX ORDER — OMEPRAZOLE 20 MG/1
20 CAPSULE, DELAYED RELEASE ORAL DAILY
COMMUNITY

## 2025-02-10 RX ORDER — MAGNESIUM SULFATE HEPTAHYDRATE 40 MG/ML
2 INJECTION, SOLUTION INTRAVENOUS ONCE
Status: COMPLETED | OUTPATIENT
Start: 2025-02-10 | End: 2025-02-10

## 2025-02-10 RX ORDER — HEPARIN SODIUM 5000 [USP'U]/ML
7500 INJECTION, SOLUTION INTRAVENOUS; SUBCUTANEOUS EVERY 8 HOURS SCHEDULED
Status: DISCONTINUED | OUTPATIENT
Start: 2025-02-10 | End: 2025-02-11 | Stop reason: HOSPADM

## 2025-02-10 RX ORDER — HYDRALAZINE HYDROCHLORIDE 10 MG/1
10 TABLET, FILM COATED ORAL 3 TIMES DAILY
COMMUNITY
End: 2025-02-14 | Stop reason: SDUPTHER

## 2025-02-10 RX ADMIN — CALCIUM GLUCONATE 2 G: 20 INJECTION, SOLUTION INTRAVENOUS at 21:13

## 2025-02-10 RX ADMIN — SODIUM CHLORIDE 250 ML: 0.9 INJECTION, SOLUTION INTRAVENOUS at 16:58

## 2025-02-10 RX ADMIN — HEPARIN SODIUM 7500 UNITS: 5000 INJECTION INTRAVENOUS; SUBCUTANEOUS at 22:17

## 2025-02-10 RX ADMIN — ACETAMINOPHEN 975 MG: 325 TABLET, FILM COATED ORAL at 15:31

## 2025-02-10 RX ADMIN — POTASSIUM CHLORIDE 20 MEQ: 1500 TABLET, EXTENDED RELEASE ORAL at 16:49

## 2025-02-10 RX ADMIN — MAGNESIUM SULFATE HEPTAHYDRATE 2 G: 40 INJECTION, SOLUTION INTRAVENOUS at 16:52

## 2025-02-10 RX ADMIN — INSULIN LISPRO 2 UNITS: 100 INJECTION, SOLUTION INTRAVENOUS; SUBCUTANEOUS at 22:18

## 2025-02-10 RX ADMIN — ASPIRIN 325 MG ORAL TABLET 325 MG: 325 PILL ORAL at 15:26

## 2025-02-10 RX ADMIN — POTASSIUM CHLORIDE 20 MEQ: 14.9 INJECTION, SOLUTION INTRAVENOUS at 16:52

## 2025-02-10 RX ADMIN — POTASSIUM CHLORIDE 20 MEQ: 14.9 INJECTION, SOLUTION INTRAVENOUS at 22:52

## 2025-02-10 RX ADMIN — POTASSIUM CHLORIDE 20 MEQ: 14.9 INJECTION, SOLUTION INTRAVENOUS at 20:15

## 2025-02-10 NOTE — H&P
H&P - Hospitalist   Name: Tommie Taylor 59 y.o. male I MRN: [id number]  Unit/Bed#: ED-16 I Date of Admission: 2/10/2025   Date of Service: 2/10/2025 I Hospital Day: 0     Assessment & Plan  Stroke-like symptoms  PMH of stroke in October 2024 with residual Right upper extremities weakness   Sudden onset generalized weakness, dizziness and feeling of syncope.  No loss of consciousness.  Endorses dysarthria and right facial asymmetry  CT brain - No acute abnormality. Chronic infarct in left corona radiata and left striatocapsular regions with mild chronic microangiopathy  CTA head/neck - Negative for large vessel occlusion, dissection or aneurysm. Severe focal stenossi in bilateral PCA likely due to atherosclerotic disease.  Physical exam - dysarthria improving. Cranial nerves intact, sensation intact all extremities. Left upper and lower extremity strength 5/5.  Right upper extremity strength 3/5, right lower extremity strength is 4/5.  Per patient this is baseline.    Plan:  Status post loading aspirin 324 Mg once  Continue aspirin 81 mg daily  Continue Lipitor 20 Mg daily  Follow-up MRI brain - based on MRI results consider starting Plavix 300 Mg loading dose  Follow-up echo with bubble study  Follow-up lipid, A1c  Permissive hypertension over 220/120 for the first 24 hours and then gradual return to normal blood pressure  Hold atenolol, hydralazine, nifedipine for now  Neuro check  Monitor on telemetry  Follow-up PT/OT eval and treat  Bedside swallow study  Hypokalemia  POA potassium at 2.5  S/P potassium repletion 80 mg    Plan:  Recheck potassium at 2AM  Replete as needed   Hypocalcemia  Calcium 6.3, Ionized calcium 0.97   S/P replete calcium gluconate 2g    Plan:  Recheck calcium at 2 AM  Replete as needed  Anemia, unspecified  Hgb 7.4 (Base line 9-10), MCV low at 78, RDW 14 wnl, Platelet 123  No sign or complain of bleeding     Plan:  Hold off blood transfusion  Monitor hgb in AM lab  CVA (cerebral vascular  accident) (Formerly [facility])  Left corona radiata stroke (Oct 2024) with residual right upper extremity and lower extremity weakness.  Home medication-aspirin 81 Mg and Lipitor 20 Mg daily    Plan:  Continue prior home medication  Type 2 diabetes mellitus ([facility])  Lab Results   Component Value Date    HGBA1C 6.5 02/05/2025       Recent Labs     02/10/25  1528   POCGLU 150*       Blood Sugar Average: Last 72 hrs:  (P) 150  Home - NPH 20 units before breakfast and 10 units in the evening     Plan:  Lighting scale algorithm 4      VTE Pharmacologic Prophylaxis:   High Risk (Score >/= 5) - Pharmacological DVT Prophylaxis Ordered: heparin. Sequential Compression Devices Ordered.  Code Status: Prior Patient and Patient's significant other   Discussion with family: Updated  (wife) via phone.    Anticipated Length of Stay: Patient will be admitted on an inpatient basis with an anticipated length of stay of greater than 2 midnights secondary to shocklike symptoms.    History of Present Illness   Chief Complaint: Slurred speech, generalized weakness, dizziness    Tommie Taylor is a 59 y.o. male with a PMH of stroke (oct 2024, with residual right upper side weakness), bipolar disorder, CKD stage IV, type 2 diabetes, hypertension, tardive dyskinesia, sleep apnea, GERD and mild memory deficiency, brought in by EMS and presents with sudden onset of generalized weakness, dizziness and feeling about to pass out during grocery shopping. Patient never lose consciousness, but endorses slurred speech. EMS noted right facial weakness/ asymmetry.     In ED, patient found to have hypokalemic at 2.5, hypocalcium at 6.3. Given significant slurred speech, stroke alert was called.      Review of Systems   Constitutional:  Negative for activity change, appetite change, chills and fever.   HENT:  Negative for congestion, nosebleeds, sneezing, sore throat, tinnitus and voice change.    Respiratory:  Negative for apnea, choking and chest  tightness.    Cardiovascular:  Negative for chest pain, palpitations and leg swelling.   Gastrointestinal:  Negative for abdominal distention, abdominal pain, anal bleeding, constipation, diarrhea, nausea and vomiting.   Endocrine: Negative for polydipsia, polyphagia and polyuria.   Genitourinary:  Negative for difficulty urinating, dysuria, hematuria and urgency.   Musculoskeletal:  Negative for arthralgias, gait problem, joint swelling and neck pain.   Skin:  Negative for color change, pallor and rash.   Neurological:  Positive for dizziness, facial asymmetry (Right side), speech difficulty and weakness (Right extermities). Negative for tremors, seizures, syncope, light-headedness, numbness and headaches.   Psychiatric/Behavioral:  Negative for agitation, behavioral problems and confusion.        Historical Information   Past Medical History:   Diagnosis Date    ADHD, adult residual type     Anxiety     Anxiety     Bipolar 1 disorder (HCC)     Cataract     Left eye    Chronic kidney disease     Colon polyp     CPAP (continuous positive airway pressure) dependence     Depression     Depression     Diabetes mellitus (HCC)     blood sugar 167 on admission    Equinus deformity of foot     GERD (gastroesophageal reflux disease)     Homicidal ideations     Hyperlipidemia     Hypertension     Microalbuminuria     Morbid obesity (HCC)     Neuropathy     Shortness of breath     Sleep apnea     CPAP at bedtime    Sleep apnea     Stroke (HCC)     Suicidal intent      Past Surgical History:   Procedure Laterality Date    CATARACT EXTRACTION      CATARACT EXTRACTION      COLONOSCOPY      HAND SURGERY Right     OTHER SURGICAL HISTORY      REPAIR OF SUPERFICIAL WOUND FACE    AL ESOPHAGOGASTRODUODENOSCOPY TRANSORAL DIAGNOSTIC N/A 06/14/2018    Procedure: ESOPHAGOGASTRODUODENOSCOPY (EGD);  Surgeon: Yinka Maier MD;  Location: [facility];  Service: Gastroenterology    AL ESOPHAGOGASTRODUODENOSCOPY TRANSORAL DIAGNOSTIC N/A  "2018    Procedure: EGD AND COLONOSCOPY;  Surgeon: Yinka Maier MD;  Location: [facility];  Service: Gastroenterology     Social History     Tobacco Use    Smoking status: Former     Current packs/day: 0.00     Types: Cigarettes     Quit date:      Years since quittin.1    Smokeless tobacco: Current     Types: Chew    Tobacco comments:     pt \"Quit after approx over 25 years ago\"   Vaping Use    Vaping status: Never Used   Substance and Sexual Activity    Alcohol use: Not Currently     Comment: rarely    Drug use: Not Currently     Comment: quit 20 years ago    Sexual activity: Yes     Partners: Female     Birth control/protection: None     Comment: steady girlfriend     E-Cigarette/Vaping    E-Cigarette Use Never User      E-Cigarette/Vaping Substances    Nicotine No     THC No     CBD No     Flavoring No     Other No     Unknown No      Family History   Problem Relation Age of Onset    Diabetes Mother     Alcohol abuse Father     Diabetes Father     Hypertension Father     Lung cancer Father     Stroke Father     Cancer Father         lung    Alcohol abuse Sister     Depression Sister     Lymphoma Sister     Alcohol abuse Family     Alcohol abuse Sister     Alcohol abuse Sister     Cancer Sister     Heart disease Neg Hx     Thyroid disease Neg Hx      Social History:  Marital Status: /Civil Union   Occupation: Retired   Patient Pre-hospital Living Situation: Home  Patient Pre-hospital Level of Mobility: walks with walker  Patient Pre-hospital Diet Restrictions: None    Meds/Allergies   I have reviewed home medications with patient family member.  Prior to Admission medications    Medication Sig Start Date End Date Taking? Authorizing Provider   acetaminophen (TYLENOL) 325 mg tablet Take 2 tablets (650 mg total) by mouth every 6 (six) hours as needed for mild pain, fever or headaches  Patient not taking: Reported on 2025 10/26/24   Ale Sanchez MD   ammonium lactate " (LAC-HYDRIN) 12 % cream Apply topically as needed for dry skin  Patient not taking: Reported on 1/6/2025 9/25/24   Layton Flores MD   aspirin 81 mg EC tablet Take 1 tablet (81 mg total) by mouth daily 2/5/25 5/6/25  Mali Traylor MD   atenolol (TENORMIN) 25 mg tablet Take 1 tablet (25 mg total) by mouth daily 2/5/25   Mali Traylor MD   atorvastatin (LIPITOR) 20 mg tablet Take 1 tablet (20 mg total) by mouth daily 2/5/25   Mali Traylor MD   Blood Glucose Monitoring Suppl (Assure 3 Meter) KIT Use 1 each 4 (four) times a day Fine to substitute other brand if not covered by insurance 12/18/24   Abner Simon MD   Blood Glucose Monitoring Suppl (Blood Glucose Monitor System) w/Device KIT Use 3 (three) times a day 1/8/25   Santy Donaldson MD   Blood Glucose Monitoring Suppl (FREESTYLE LITE) ANTONIA by Does not apply route 3 (three) times a day 3/22/19   Perla Fraire PA-C   Blood Glucose Monitoring Suppl (FreeStyle Lite) w/Device KIT Use as instructed 4 times a day 1/7/25   Fracisco Barraza MD   calcium carbonate (TUMS) 500 mg chewable tablet Chew 2 tablets (1,000 mg total) 2 (two) times a day as needed for indigestion or heartburn  Patient not taking: Reported on 1/6/2025 10/26/24   Ale Sanchez MD   Cholecalciferol (VITAMIN D3) 1,000 units tablet Take 2 tablets (2,000 Units total) by mouth daily 2/5/25 5/6/25  Mali Traylor MD   ergocalciferol (VITAMIN D2) 50,000 units Take 1 capsule (50,000 Units total) by mouth 3 (three) times a week  Patient not taking: Reported on 1/28/2025 11/20/24   Cathy Miranda MD   glucose blood (FREESTYLE LITE) test strip CHECK BLOOD SUGARS FOUR TIMES DAILY  Patient not taking: Reported on 1/28/2025 1/7/25   Fracisco Barraza MD   glucose blood test strip CHECK BLOOD GLUCOSE 3 TIMES DAILY  Patient not taking: Reported on 1/28/2025 1/8/25   Santy Donaldson MD   insulin NPH-insulin regular (NovoLIN 70/30 ReliOn) 100 units/mL subcutaneous injection  "Inject 20 Units under the skin daily before breakfast AND 10 Units in the evening. Take before meals. Use 20 units with breakfast and 10 units with dinner. 2/5/25   Tamara Amin DO   Insulin Pen Needle 31G X 8 MM MISC Check sugars three times a day 2/5/25   Tamara Amin, DO   INSULIN SYRINGE .5CC/29G 29G X 1/2\" 0.5 ML MISC Use 2 (two) times a day 2/5/25 5/6/25  Tamara Amin DO   Lancets (freestyle) lancets Use as instructed 1/7/25   Fracisco Barraza MD   Lancets MISC Use 3 (three) times a day 1/9/25   Layton Flores MD   magnesium gluconate (MAGONATE) 500 MG tablet Take 1 tablet (500 mg total) by mouth daily Do not start before November 16, 2024.  Patient not taking: Reported on 1/28/2025 11/16/24   Cathy Miranda MD   NIFEdipine (ADALAT CC) 90 mg 24 hr tablet Take 1 tablet (90 mg total) by mouth daily 2/5/25   Mali Traylor MD     Allergies   Allergen Reactions    Pollen Extract Nasal Congestion    Tetanus Toxoid Swelling       Objective :  Temp:  [98 °F (36.7 °C)] 98 °F (36.7 °C)  HR:  [70-83] 71  BP: (128-148)/(69-84) 140/76  Resp:  [16] 16  SpO2:  [96 %-98 %] 97 %  O2 Device: None (Room air)    Physical Exam  Vitals reviewed.   Constitutional:       Appearance: Normal appearance.   Eyes:      General: No scleral icterus.        Right eye: No discharge.         Left eye: No discharge.   Cardiovascular:      Rate and Rhythm: Normal rate and regular rhythm.      Pulses: Normal pulses.      Heart sounds: Normal heart sounds. No murmur heard.     No friction rub.   Pulmonary:      Effort: Pulmonary effort is normal. No respiratory distress.      Breath sounds: Normal breath sounds. No stridor. No wheezing or rales.   Chest:      Chest wall: No tenderness.   Abdominal:      General: Bowel sounds are normal. There is no distension.      Palpations: Abdomen is soft.      Tenderness: There is no abdominal tenderness. There is no guarding.   Musculoskeletal:         General: Normal range of motion.      " Cervical back: Normal range of motion.      Right lower leg: No edema.      Left lower leg: No edema.   Skin:     General: Skin is warm.      Capillary Refill: Capillary refill takes less than 2 seconds.      Coloration: Skin is not jaundiced or pale.   Neurological:      General: No focal deficit present.      Mental Status: He is alert and oriented to person, place, and time. Mental status is at baseline.      Cranial Nerves: No cranial nerve deficit.      Sensory: No sensory deficit.      Motor: Weakness (Right upper extermities 3/5, Right lower extermiteies 4/5) present.      Comments: Dysarthria seem to be improving  Negative for facial asymmetry   Psychiatric:         Mood and Affect: Mood normal.         Behavior: Behavior normal.         Thought Content: Thought content normal.         Judgment: Judgment normal.          Lines/Drains:            Lab Results: I have reviewed the following results:  Results from last 7 days   Lab Units 02/10/25  1515   WBC Thousand/uL 5.22   HEMOGLOBIN g/dL 7.4*   HEMATOCRIT % 22.4*   PLATELETS Thousands/uL 123*     Results from last 7 days   Lab Units 02/10/25  1515   SODIUM mmol/L 141   POTASSIUM mmol/L 2.5*   CHLORIDE mmol/L 118*   CO2 mmol/L 16*   BUN mg/dL 16   CREATININE mg/dL 2.27*   ANION GAP mmol/L 7   CALCIUM mg/dL 6.3*   GLUCOSE RANDOM mg/dL 137     Results from last 7 days   Lab Units 02/10/25  1515   INR  1.31*     Results from last 7 days   Lab Units 02/10/25  1528   POC GLUCOSE mg/dl 150*     Lab Results   Component Value Date    HGBA1C 6.5 02/05/2025    HGBA1C 8.6 (H) 10/16/2024    HGBA1C 8.1 (H) 10/15/2024           Imaging Results Review: I reviewed radiology reports from this admission including: chest xray and CT head.  Other Study Results Review: EKG was reviewed.     Administrative Statements       ** Please Note: This note has been constructed using a voice recognition system. **

## 2025-02-10 NOTE — ASSESSMENT & PLAN NOTE
Tommie is a 59-year-old male with history of HTN, HLD, type 2 diabetes, prior left corona radiata infarct on aspirin 81 mg daily with residual RUE/RLE LUE weakness who presented to the hospital on 2/10 initially with nausea and vomiting while at [employer], but on his way into the ED, developed right-sided facial weakness and slurred speech.  Reports that he had the symptoms previously when he had the acute left frontal radiata infarct in October 2024.  No recent illness otherwise.  Stroke alert was called at 3 PM.  Last known well about 5 to 10 minutes prior.  Initial blood pressure 137/78, glucose 161.  NIHSS 4. MRS 4.  CT head showed no acute intracranial abnormality.  Unchanged chronic infarct in left corona radiata and left striatocapsular regions with mild chronic microangiopathy.  CTA head and neck showed no LVO, dissection, aneurysm.  Does show severe focal stenosis in bilateral P2 segments, likely due to atherosclerotic disease, worsened since 10/14/2024. No TNK due to non-disabling symptoms (R facial weakness, mild dysarthria). No thrombectomy due to no LVO. Admitted under stroke pathway and further medical workup.   Vascular risk factors: HTN, HLD, T2DM, former smoker  Baseline: 4/5 RUE/RLE weakness. Pt unsure if has been experiencing R facial weakness and dysarthria recently. But interestingly, per 1/6/25 neurology OP note - pt noted to have R facial weakness, dysarthria, 4/5 RLE, 3/5 RUE.  MRS 4    Assessment: Suspect recrudescence of prior stroke symptoms in setting of physiologic stress (anemia, hypokalemia) vs acute stroke.     Plan, stroke pathway:  Discussed with neurology attending, Dr. Escobar  MRI brain pending - if pts' symptoms do resolve to baseline with correction of metabolic issues prior to MRI brain, reasonable to d/c stroke pathway and cancel MRI order.   TTE pending  Lipid panel, A1c pending  Permissive HTN <220/120 for first 24 hours, and then gradual return to normotension  Bedside  swallow eval  Load ASA 324mg x 1, followed by ASA 81mg daily  Atorvastatin 40mg QHS  Glucose <180   Neuro checks- Every 1 hour x 4 hours, then every 2 hours x 4 hours, then every 4 hours x 72 hours     Stat CT Head for change in neuro status.  Monitor on telemetry  Also recommend obtaining orthostatic vitals prior to IV fluids.  DVT ppx per primary team, SCDs  PT/OT/ST/PM&R evaluations   Medical management as per primary team appreciated.

## 2025-02-10 NOTE — CASE MANAGEMENT
Case Management Progress Note    Patient name Tommie [place]  Location ED-16/ED-16 MRN [id number]  : 1965 Date 2/10/2025       LOS (days): 0  Geometric Mean LOS (GMLOS) (days):   Days to GMLOS:        OBJECTIVE:        Current admission status: Emergency  Preferred Pharmacy:   [facility] [place], [place] [id number] [address] [address]  Phone: [phone] Fax: [phone]    Primary Care Provider: Layton Flores MD    Primary Insurance: MEDICARE  Secondary Insurance: [facility]    PROGRESS NOTE:      Cm attempted to meet with patient to introduce self and start dc planning. Patient sleeping. CM to check back later when able.

## 2025-02-10 NOTE — CONSULTS
NEUROLOGY RESIDENCY - STROKE CONSULT NOTE     Assessment & Plan  Stroke-like symptoms  Tommie is a 59-year-old male with history of HTN, HLD, type 2 diabetes, prior left corona radiata infarct on aspirin 81 mg daily with residual RUE/RLE LUE weakness who presented to the hospital on 2/10 initially with nausea and vomiting while at [employer], but on his way into the ED, developed right-sided facial weakness and slurred speech.  Reports that he had the symptoms previously when he had the acute left frontal radiata infarct in October 2024.  No recent illness otherwise.  Stroke alert was called at 3 PM.  Last known well about 5 to 10 minutes prior.  Initial blood pressure 137/78, glucose 161.  NIHSS 4. MRS 4.  CT head showed no acute intracranial abnormality.  Unchanged chronic infarct in left corona radiata and left striatocapsular regions with mild chronic microangiopathy.  CTA head and neck showed no LVO, dissection, aneurysm.  Does show severe focal stenosis in bilateral P2 segments, likely due to atherosclerotic disease, worsened since 10/14/2024. No TNK due to non-disabling symptoms (R facial weakness, mild dysarthria). No thrombectomy due to no LVO. Admitted under stroke pathway and further medical workup.   Vascular risk factors: HTN, HLD, T2DM, former smoker  Baseline: 4/5 RUE/RLE weakness. Pt unsure if has been experiencing R facial weakness and dysarthria recently. But interestingly, per 1/6/25 neurology OP note - pt noted to have R facial weakness, dysarthria, 4/5 RLE, 3/5 RUE.  MRS 4    Assessment: Suspect recrudescence of prior stroke symptoms in setting of physiologic stress (anemia, hypokalemia) vs acute stroke.     Plan, stroke pathway:  Discussed with neurology attending, Dr. Escobar  MRI brain pending - if pts' symptoms do resolve to baseline with correction of metabolic issues prior to MRI brain, reasonable to d/c stroke pathway and cancel MRI order.   TTE pending  Lipid panel, A1c pending  Permissive  HTN <220/120 for first 24 hours, and then gradual return to normotension  Bedside swallow eval  Load ASA 324mg x 1, followed by ASA 81mg daily  Atorvastatin 40mg QHS  Glucose <180   Neuro checks- Every 1 hour x 4 hours, then every 2 hours x 4 hours, then every 4 hours x 72 hours     Stat CT Head for change in neuro status.  Monitor on telemetry  Also recommend obtaining orthostatic vitals prior to IV fluids.  DVT ppx per primary team, SCDs  PT/OT/ST/PM&R evaluations   Medical management as per primary team appreciated.        Thrombolytic Decision: Patient not a candidate. Symptoms resolved/clearly non disabling.    Recommendations for outpatient neurological follow up have yet to be determined.    History of Present Illness   Hx and PE limited by: none  Patient last known well: 0245  Stroke alert called: 1500  Neurology time of arrival: immediate  HPI: Tommie [place] is a 59-year-old male with history of HTN, HLD, type 2 diabetes, prior left corona radiata infarct on aspirin 81 mg daily with residual RUE/RLE LUE weakness who presented to the hospital on 2/10 initially with nausea and vomiting while at [employer], but on his way into the ED, developed right-sided facial weakness and slurred speech.  Reports that he had the symptoms previously when he had the acute left frontal radiata infarct in October 2024.  No recent illness otherwise.  Stroke alert was called at 3 PM.  Last known well about 5 to 10 minutes prior.  Initial blood pressure 137/78, glucose 161.  NIHSS 4. MRS 4.  CT head showed no acute intracranial abnormality.  Unchanged chronic infarct in left corona radiata and left striatocapsular regions with mild chronic microangiopathy.  CTA head and neck showed no LVO, dissection, aneurysm.  Does show severe focal stenosis in bilateral P2 segments, likely due to atherosclerotic disease, worsened since 10/14/2024. No TNK due to non-disabling symptoms (R facial weakness, mild dysarthria). No thrombectomy due  to no LVO. Admitted under stroke pathway and further medical workup. Upon further clarification with patient, R facial weakness and dysarthria are not new but are worse compared to baseline.     Labs significant for K 2.5, Hgb 7.4 (baseline 9.3-10.9).    Inpatient consult to Neurology  Consult performed by: Kaykay Ch DO  Consult ordered by: Richard North DO            Review of Systems  Constitutional:  Negative for chills, diaphoresis and fever.   Eyes:  Positive for visual disturbance. Negative for photophobia.   Respiratory:  Negative for cough and shortness of breath.    Cardiovascular:  Negative for chest pain.   Gastrointestinal:  Positive for nausea. Negative for abdominal pain and diarrhea.   Musculoskeletal:  Positive for gait problem.   Neurological:  Positive for dizziness, facial asymmetry, speech difficulty, weakness and light-headedness. Negative for syncope and numbness.   All other systems reviewed and are negative.     I have reviewed the patient's PMH, PSH, Social History, Family History, Meds, and Allergies  Historical Information   Past Medical History:   Diagnosis Date    ADHD, adult residual type     Anxiety     Anxiety     Bipolar 1 disorder (HCC)     Cataract     Left eye    Chronic kidney disease     Colon polyp     CPAP (continuous positive airway pressure) dependence     Depression     Depression     Diabetes mellitus (HCC)     blood sugar 167 on admission    Equinus deformity of foot     GERD (gastroesophageal reflux disease)     Homicidal ideations     Hyperlipidemia     Hypertension     Microalbuminuria     Morbid obesity (HCC)     Neuropathy     Shortness of breath     Sleep apnea     CPAP at bedtime    Sleep apnea     Stroke (HCC)     Suicidal intent      Past Surgical History:   Procedure Laterality Date    CATARACT EXTRACTION      CATARACT EXTRACTION      COLONOSCOPY      HAND SURGERY Right     OTHER SURGICAL HISTORY      REPAIR OF SUPERFICIAL WOUND FACE    MD  "ESOPHAGOGASTRODUODENOSCOPY TRANSORAL DIAGNOSTIC N/A 2018    Procedure: ESOPHAGOGASTRODUODENOSCOPY (EGD);  Surgeon: Yinka Maier MD;  Location: [facility];  Service: Gastroenterology    AR ESOPHAGOGASTRODUODENOSCOPY TRANSORAL DIAGNOSTIC N/A 2018    Procedure: EGD AND COLONOSCOPY;  Surgeon: Yinka Maier MD;  Location: [facility];  Service: Gastroenterology     Social History     Tobacco Use    Smoking status: Former     Current packs/day: 0.00     Types: Cigarettes     Quit date:      Years since quittin.1    Smokeless tobacco: Current     Types: Chew    Tobacco comments:     pt \"Quit after approx over 25 years ago\"   Vaping Use    Vaping status: Never Used   Substance and Sexual Activity    Alcohol use: Not Currently     Comment: rarely    Drug use: Not Currently     Comment: quit 20 years ago    Sexual activity: Yes     Partners: Female     Birth control/protection: None     Comment: steady girlfriend     E-Cigarette/Vaping    E-Cigarette Use Never User      E-Cigarette/Vaping Substances    Nicotine No     THC No     CBD No     Flavoring No     Other No     Unknown No      Family History   Problem Relation Age of Onset    Diabetes Mother     Alcohol abuse Father     Diabetes Father     Hypertension Father     Lung cancer Father     Stroke Father     Cancer Father         lung    Alcohol abuse Sister     Depression Sister     Lymphoma Sister     Alcohol abuse Family     Alcohol abuse Sister     Alcohol abuse Sister     Cancer Sister     Heart disease Neg Hx     Thyroid disease Neg Hx      Social History     Tobacco Use    Smoking status: Former     Current packs/day: 0.00     Types: Cigarettes     Quit date:      Years since quittin.1    Smokeless tobacco: Current     Types: Chew    Tobacco comments:     pt \"Quit after approx over 25 years ago\"   Vaping Use    Vaping status: Never Used   Substance and Sexual Activity    Alcohol use: Not Currently     Comment: rarely    Drug use: Not " "Currently     Comment: quit 20 years ago    Sexual activity: Yes     Partners: Female     Birth control/protection: None     Comment: steady girlfriend     No current facility-administered medications for this encounter.  Prior to Admission Medications   Prescriptions Last Dose Informant Patient Reported? Taking?   Blood Glucose Monitoring Suppl (Assure 3 Meter) KIT  Self No No   Sig: Use 1 each 4 (four) times a day Fine to substitute other brand if not covered by insurance   Blood Glucose Monitoring Suppl (Blood Glucose Monitor System) w/Device KIT   No No   Sig: Use 3 (three) times a day   Blood Glucose Monitoring Suppl (FREESTYLE LITE) ANTONIA  Self No No   Sig: by Does not apply route 3 (three) times a day   Blood Glucose Monitoring Suppl (FreeStyle Lite) w/Device KIT   No No   Sig: Use as instructed 4 times a day   Cholecalciferol (VITAMIN D3) 1,000 units tablet   No No   Sig: Take 2 tablets (2,000 Units total) by mouth daily   INSULIN SYRINGE .5CC/29G 29G X 1/2\" 0.5 ML MISC   No No   Sig: Use 2 (two) times a day   Insulin Pen Needle 31G X 8 MM MISC   No No   Sig: Check sugars three times a day   Lancets (freestyle) lancets   No No   Sig: Use as instructed   Lancets MISC   No No   Sig: Use 3 (three) times a day   NIFEdipine (ADALAT CC) 90 mg 24 hr tablet   No No   Sig: Take 1 tablet (90 mg total) by mouth daily   acetaminophen (TYLENOL) 325 mg tablet  Self No No   Sig: Take 2 tablets (650 mg total) by mouth every 6 (six) hours as needed for mild pain, fever or headaches   Patient not taking: Reported on 1/6/2025   ammonium lactate (LAC-HYDRIN) 12 % cream  Self No No   Sig: Apply topically as needed for dry skin   Patient not taking: Reported on 1/6/2025   aspirin 81 mg EC tablet   No No   Sig: Take 1 tablet (81 mg total) by mouth daily   atenolol (TENORMIN) 25 mg tablet   No No   Sig: Take 1 tablet (25 mg total) by mouth daily   atorvastatin (LIPITOR) 20 mg tablet   No No   Sig: Take 1 tablet (20 mg total) by " mouth daily   calcium carbonate (TUMS) 500 mg chewable tablet  Self No No   Sig: Chew 2 tablets (1,000 mg total) 2 (two) times a day as needed for indigestion or heartburn   Patient not taking: Reported on 1/6/2025   ergocalciferol (VITAMIN D2) 50,000 units  Self No No   Sig: Take 1 capsule (50,000 Units total) by mouth 3 (three) times a week   Patient not taking: Reported on 1/28/2025   glucose blood (FREESTYLE LITE) test strip   No No   Sig: CHECK BLOOD SUGARS FOUR TIMES DAILY   Patient not taking: Reported on 1/28/2025   glucose blood test strip   No No   Sig: CHECK BLOOD GLUCOSE 3 TIMES DAILY   Patient not taking: Reported on 1/28/2025   insulin NPH-insulin regular (NovoLIN 70/30 ReliOn) 100 units/mL subcutaneous injection   No No   Sig: Inject 20 Units under the skin daily before breakfast AND 10 Units in the evening. Take before meals. Use 20 units with breakfast and 10 units with dinner.   magnesium gluconate (MAGONATE) 500 MG tablet  Self No No   Sig: Take 1 tablet (500 mg total) by mouth daily Do not start before November 16, 2024.   Patient not taking: Reported on 1/28/2025      Facility-Administered Medications: None     Pollen extract and Tetanus toxoid    Objective :    Temp:  [98 °F (36.7 °C)] 98 °F (36.7 °C)  HR:  [70-83] 71  BP: (128-148)/(69-84) 140/76  Resp:  [16] 16  SpO2:  [96 %-98 %] 97 %  O2 Device: None (Room air)    Physical Exam   Vitals reviewed.   Constitutional:    Not in acute distress. Appears slightly fatigued. Non-toxic appearing.   HENT:   Normocephalic and atraumatic.  External ear normal b/l. Nose normal. Mucous membranes are dry.  Oropharynx is clear. No oropharyngeal exudate or posterior oropharyngeal erythema.   Eyes:    No scleral icterus.  No discharge b/l.  Conjunctivae normal.   Cardiovascular: no clear edema  Pulmonary:  No respiratory distress.   Musculoskeletal: no gross deformities  Skin:    Skin is not pale.      Neurologic Exam   MENTAL STATUS: AAOx3. Follows  simple/complex commands. Affect normal w/ congruent mood.     LANGUAGE: Speech spontaneous and fluent. No aphasia -  naming, repetition, comprehension, reading intact. Mild to moderate dysarthria.     CRANIAL NERVES:  CN II: VF deficit in upper and lower nasal fields of L eye; otherwise, normal VF in all 4 quadrants of R eye and upper & lower temporal quadrants of L eye. PERRL - 1mm b/l.                CN III, IV, VI: EOM's intact w/o nystagmus, gaze palsy, or preference.               CN V: Normal masseter bulk. V1-V3 sensation intact and symmetric bilaterally.               CN VII: R lower facial weakness at rest and with activation. No upper facial weakness noted.               CN VIII: Hearing grossly intact bilaterally.               CN IX-X: Mild to moderate dysarthria. Palate elevates symmetrically. Uvula midline.               CN XI: Shoulder shrug symmetric.               CN XII: Tongue midline. No deviation, atrophy, or fasciculations.     MOTOR: Normal muscle bulk. Normal tone. No tremors or abnormal involuntary movements appreciated. Formal strength testing as follows:  Upper extremity:    Shoulder abduction Elbow flexion Elbow extension  strength   Right 4-/5 3+/5 3+/5 4/5   Left 5/5 5/5 5/5 5/5      Lower extremity:    Hip flexion Knee flexion Knee extension Ankle dorsiflexion Ankle plantarflexion   Right 5-/5 5-/5 5-/5 5/5 5/5   Left 5/5 5/5 5/5 5/5 5/5      SENSORY:  Light touch: Intact and symmetric throughout.  Pinprick: Intact and symmetric throughout.     REFLEXES:    Biceps Triceps Brachioradialis Patellar   Right 1+ 1+ 1+ 1+   Left 1+ 1+ 1+ 1+      COORDINATION: No ataxia/dysmetria noted with L FNF and b/l HTS. Unable to test R FNF due to weakness.     GAIT: Deferred    NIHSS:  1a.Level of Consciousness: 0 = Alert   1b. LOC Questions: 0 = Answers both correctly   1c. LOC Commands: 0 = Obeys both correctly   2. Best Gaze: 0 = Normal   3. Visual: 1 = Partial hemianopia    4. Facial Palsy:  1=Minor paralysis (flattened nasolabial fold, asymmetric on smiling)   5a. Motor Right Arm: 1=Drift, limb holds 90 (or 45) degrees but drifts down before full 10 seconds: does not hit bed   5b. Motor Left Arm: 0=No drift, limb holds 90 (or 45) degrees for full 10 seconds   6a. Motor Right Le=No drift, limb holds 90 (or 45) degrees for full 10 seconds   6b. Motor Left Le=No drift, limb holds 90 (or 45) degrees for full 10 seconds   7. Limb Ataxia:  0=Absent   8. Sensory: 0=Normal; no sensory loss   9. Best Language:  0=No aphasia, normal   10. Dysarthria: 1=Mild to moderate, patient slurs at least some words and at worst, can be understood with some difficulty   11. Extinction and Inattention (formerly Neglect): 0=No abnormality   Total Score: 4     Time NIHSS was completed: 1515    Modified Gravelly Score:  4 (Moderately severe disability; unable to walk and attend to bodily needs without assistance)      Lab Results: I have reviewed the following results:  Results from last 7 days   Lab Units 02/10/25  1515   WBC Thousand/uL 5.22   HEMOGLOBIN g/dL 7.4*   HEMATOCRIT % 22.4*   PLATELETS Thousands/uL 123*      Results from last 7 days   Lab Units 02/10/25  1515   POTASSIUM mmol/L 2.5*   CHLORIDE mmol/L 118*   CO2 mmol/L 16*   BUN mg/dL 16   CREATININE mg/dL 2.27*   CALCIUM mg/dL 6.3*              Results from last 7 days   Lab Units 02/10/25  1515   INR  1.31*   PTT seconds 29               CTA stroke alert (head/neck)   ED Interpretation by Fernanda De Santiago MD (02/10 1529)   FINDINGS:     CTA NECK  ARCH AND GREAT VESSELS: Mild atherosclerotic changes with no severe stenosis. Common origin of brachiocephalic and left common carotid artery, normal variant.  VERTEBRAL ARTERIES: Patent hypoplastic right and dominant left extracranial segments.  RIGHT CAROTID: Patent. Minimal calcified atherosclerotic disease in carotid bifurcation. No stenosis.    No dissection. Tortuous right ICA proximal cervical segment  LEFT  CAROTID: Patent. Minimal calcified atherosclerotic disease in carotid bifurcation. No stenosis.    No dissection. Tortuous left ICA proximal cervical segment.  NASCET criteria was used to determine the degree of internal carotid artery diameter stenosis.     CTA BRAIN:  INTERNAL CAROTID ARTERIES: Mild calcified atherosclerotic disease in bilateral ICA cavernous segments. No stenosis or occlusion.  ANTERIOR CEREBRAL ARTERY CIRCULATION:  No stenosis or occlusion.  MIDDLE CEREBRAL ARTERY CIRCULATION:  No stenosis or occlusion.  DISTAL VERTEBRAL ARTERIES:    Patent hypoplastic right and dominant left distal vertebral arteries. Hypoplastic right vertebral artery terminates in PICA, normal variant. No stenosis or occlusion.  BASILAR ARTERY:  No stenosis or occlusion.  POSTERIOR CEREBRAL ARTERIES: Severe focal stenosis in bilateral PCA P2 segments likely due to atherosclerotic disease, worsened since 10/14/2024. No occlusion.  VENOUS STRUCTURES:  Normal.     NON VASCULAR ANATOMY     BONY STRUCTURES:  No acute osseous abnormality. Multiple missing teeth. Mild multilevel spinal degenerative changes, worse at C3-C4.     SOFT TISSUES OF THE NECK:  Normal.     THORACIC INLET: Small calcified mediastinal and right hilar lymph nodes, likely sequela of prior granulomatous disease. No focal consolidation in visualized upper lung zones.        IMPRESSION:     Negative CTA head and neck for large vessel occlusion, dissection, or aneurysm.     Severe focal stenosis in bilateral PCA P2 segments likely due to atherosclerotic disease, worsened since 10/14   /2024.     Additional chronic/incidental findings as detailed above.              Findings were directly discussed with Marie Escobar at 3:12 p.m. on 2/10/2025.     Workstation performed: [id number]        Final Result by Gabino Fletcher MD (02/10 3682)      Negative CTA head and neck for large vessel occlusion, dissection, or aneurysm.      Severe focal stenosis in bilateral  PCA P2 segments likely due to atherosclerotic disease, worsened since 10/14/2024.      Additional chronic/incidental findings as detailed above.               Findings were directly discussed with Marie Escobar at 3:12 p.m. on 2/10/2025.      Workstation performed: [id number]         CT stroke alert brain   ED Interpretation by Fernanda De Santiago MD (02/10 2712)   FINDINGS:     PARENCHYMA: Decreased attenuation is noted in periventricular and subcortical white matter demonstrating an appearance that is statistically most likely to represent mild microangiopathic change. Unchanged chronic infarcts in left corona radiata and left   striatocapsular regions.     No CT signs of acute infarction.  No intracranial mass, mass effect or midline shift.  No acute parenchymal hemorrhage.     Mild calcification of the cavernous internal carotid arteries.     VENTRICLES AND EXTRA-AXIAL SPACES:  Normal for the patient's age.     VISUALIZED ORBITS:  Sequela of bilateral cataract surgery.     PARANASAL SINUSES:  Normal visualized paranasal sinuses.     CALVARIUM AND EXTRACRANIAL SOFT TISSUES:   Normal.     IMPRESSION:     No acute intracranial abnormality.     Unchanged chronic infarcts in left corona radiata and left striatocapsular regions with mild chronic microangiopathy.     Findings were directly discussed with Marie Escobar at approximately 3:10 p.m on 2/10   /2025.     Workstation performed: [id number]        Final Result by Gabino Fletcher MD (02/10 1997)      No acute intracranial abnormality.      Unchanged chronic infarcts in left corona radiata and left striatocapsular regions with mild chronic microangiopathy.      Findings were directly discussed with Marie Escobar at approximately 3:10 p.m on 2/10/2025.      Workstation performed: [id number]               VTE Prophylaxis: VTE covered by:    None       Code Status: Prior  Advance Directive and Living Will:      Power of :    POLST:      DO Augusto Cabrera  [facility] Resident PGY3

## 2025-02-10 NOTE — ED PROVIDER NOTES
Time reflects when diagnosis was documented in both MDM as applicable and the Disposition within this note       Time User Action Codes Description Comment    2/10/2025  2:58 PM MariahhumaFernanda [R29.90] Stroke-like symptoms     2/10/2025  4:47 PM Richard North [D64.9] Symptomatic anemia     2/10/2025  4:47 PM Richard North [E87.6] Hypokalemia           ED Disposition       ED Disposition   Admit    Condition   Stable    Date/Time   Mon Feb 10, 2025  5:31 PM    Comment   Case was discussed with Dr. Foster and the patient's admission status was agreed to be Admission Status: inpatient status to the service of Dr. Foster.               Assessment & Plan       Medical Decision Making  59 year old male with a PMH of stroke in October 2024 presents to the ED via EMS for sudden onset nausea, dizziness, and facial droop around 30-40 minutes prior to arrival.     Differential diagnoses: Stroke, TIA, recrudescence, underlying infection, underlying electrolyte abnormality, underlying arrhythmia    Patient presents afebrile and hemodynamically stable.  Patient notes right-sided facial weakness and slurred speech is slightly worse than baseline.  Patient sent straight to CT upon arrival. CT head shows no acute intracranial abnormality with unchanged chronic infarcts in left corona radiata and left striatocapsular regions with mild chronic microangiopathy. CTA head/neck negative for large vessel occlusion, dissection, or aneurysm, but does show severe focal stenosis in bilateral PCA P2 segments likely due to atherosclerotic disease, worsened since 10/14/2024.  Initial NIHSS score of 1.  Patient seen by neurology team, who recommend high-dose aspirin, further workup for other underlying etiology, and plans to admit under stroke pathway for MRI.  Labs reveal hemoglobin 7.4 and significant hypokalemia.  Potassium repleted with IV and p.o. dosages.  Given patient is symptomatic at hemoglobin of 7.4, patient  consented and transfused 1 unit PRBCs.  EKG shows NSR at 78 bpm.  Initial troponin of 5 with a 2-hour delta of 0.  Patient's anemia and electrolyte abnormalities likely cause of recrudescent stroke-like symptoms.  Patient accepted to inpatient status under the service of Dr. Foster.    Amount and/or Complexity of Data Reviewed  Labs: ordered. Decision-making details documented in ED Course.  Radiology: ordered.    Risk  OTC drugs.  Prescription drug management.  Decision regarding hospitalization.        ED Course as of 02/10/25 2316   Mon Feb 10, 2025   1528 Discussed with neuro team at bedside, recommended high-dose aspirin, infectious workup for other underlying etiology, and plans to admit under stroke pathway for MRI.   1539 Hemoglobin(!): 7.4  Drop in Hgb from value 2 months ago.   1724 Patient consented for blood transfusion and agreeable with hospital admission.   1726 Messaged [facility] regarding hospital admission.       Medications   potassium chloride 20 mEq IVPB (premix) (20 mEq Intravenous New Bag 2/10/25 2252)   acetaminophen (TYLENOL) tablet 650 mg (has no administration in time range)   aspirin (ECOTRIN LOW STRENGTH) EC tablet 81 mg (has no administration in time range)   atorvastatin (LIPITOR) tablet 20 mg (has no administration in time range)   atenolol (TENORMIN) tablet 25 mg ( Oral Held Dose 2/14/25 0900)   Cholecalciferol (VITAMIN D3) tablet 2,000 Units (has no administration in time range)   ergocalciferol (VITAMIN D2) capsule 50,000 Units (has no administration in time range)   hydrALAZINE (APRESOLINE) tablet 10 mg ( Oral Held Dose 2/13/25 2100)   NIFEdipine (PROCARDIA XL) 24 hr tablet 90 mg ( Oral Held Dose 2/14/25 0900)   pantoprazole (PROTONIX) EC tablet 40 mg (has no administration in time range)   nicotine (NICODERM CQ) 7 mg/24hr TD 24 hr patch 1 patch (has no administration in time range)   heparin (porcine) subcutaneous injection 7,500 Units (7,500 Units Subcutaneous Given 2/10/25 2217)    insulin lispro (HumALOG/ADMELOG) 100 units/mL subcutaneous injection 2-12 Units (has no administration in time range)   insulin lispro (HumALOG/ADMELOG) 100 units/mL subcutaneous injection 2-12 Units (2 Units Subcutaneous Given 2/10/25 2218)   ondansetron (FOR EMS ONLY) (ZOFRAN) 4 mg/2 mL injection 4 mg (0 mg Does not apply Given to EMS 2/10/25 1512)   aspirin tablet 325 mg (325 mg Oral Given 2/10/25 1526)   acetaminophen (TYLENOL) tablet 975 mg (975 mg Oral Given 2/10/25 1531)   magnesium sulfate 2 g/50 mL IVPB (premix) 2 g (0 g Intravenous Stopped 2/10/25 1752)   potassium chloride 20 mEq IVPB (premix) (0 mEq Intravenous Stopped 2/10/25 1852)   potassium chloride (Klor-Con M20) CR tablet 20 mEq (20 mEq Oral Given 2/10/25 1649)   sodium chloride 0.9 % bolus 250 mL (0 mL Intravenous Stopped 2/10/25 1852)   calcium gluconate 2 g in sodium chloride 0.9% 100 mL (premix) (0 g Intravenous Stopped 2/10/25 2213)       ED Risk Strat Scores           Stroke Assessment       Row Name 02/10/25 9187             NIH Stroke Scale    Interval Baseline      Level of Consciousness (1a.) 0      LOC Questions (1b.) 0      LOC Commands (1c.) 0      Best Gaze (2.) 0      Visual (3.) 0      Facial Palsy (4.) 1      Motor Arm, Left (5a.) 0      Motor Arm, Right (5b.) 0      Motor Leg, Left (6a.) 0      Motor Leg, Right (6b.) 0      Limb Ataxia (7.) 0      Sensory (8.) 0      Best Language (9.) 0      Dysarthria (10.) 0      Extinction and Inattention (11.) (Formerly Neglect) 0      Total 1                    Flowsheet Row Most Recent Value   Thrombolytic Decision Options    Thrombolytic Decision After a discussion of risks, benefits, alternatives and side effects (including best medical therapy excluding thrombolysis) reviewing inclusion and exclusion criteria the decision was made to proceed with thrombolytic therapy.  [Will discuss with neurologist now]                                                History of Present Illness        Chief Complaint   Patient presents with    STROKE Alert     Pt arrives via ems from [facility]. Left side deficits.         Past Medical History:   Diagnosis Date    ADHD, adult residual type     Anxiety     Anxiety     Bipolar 1 disorder (HCC)     Cataract     Left eye    Chronic kidney disease     Colon polyp     CPAP (continuous positive airway pressure) dependence     Depression     Depression     Diabetes mellitus (HCC)     blood sugar 167 on admission    Equinus deformity of foot     GERD (gastroesophageal reflux disease)     Homicidal ideations     Hyperlipidemia     Hypertension     Microalbuminuria     Morbid obesity (HCC)     Neuropathy     Shortness of breath     Sleep apnea     CPAP at bedtime    Sleep apnea     Stroke (HCC)     Suicidal intent       Past Surgical History:   Procedure Laterality Date    CATARACT EXTRACTION      CATARACT EXTRACTION      COLONOSCOPY      HAND SURGERY Right     OTHER SURGICAL HISTORY      REPAIR OF SUPERFICIAL WOUND FACE    [place] ESOPHAGOGASTRODUODENOSCOPY TRANSORAL DIAGNOSTIC N/A 2018    Procedure: ESOPHAGOGASTRODUODENOSCOPY (EGD);  Surgeon: Yinka Maier MD;  Location: [facility];  Service: Gastroenterology    [place] ESOPHAGOGASTRODUODENOSCOPY TRANSORAL DIAGNOSTIC N/A 2018    Procedure: EGD AND COLONOSCOPY;  Surgeon: Yinka Maier MD;  Location: [facility];  Service: Gastroenterology      Family History   Problem Relation Age of Onset    Diabetes Mother     Alcohol abuse Father     Diabetes Father     Hypertension Father     Lung cancer Father     Stroke Father     Cancer Father         lung    Alcohol abuse Sister     Depression Sister     Lymphoma Sister     Alcohol abuse Family     Alcohol abuse Sister     Alcohol abuse Sister     Cancer Sister     Heart disease Neg Hx     Thyroid disease Neg Hx       Social History     Tobacco Use    Smoking status: Former     Current packs/day: 0.00     Types: Cigarettes     Quit date:      Years since quittin.1     "Smokeless tobacco: Current     Types: Chew    Tobacco comments:     pt \"Quit after approx over 25 years ago\"   Vaping Use    Vaping status: Never Used   Substance Use Topics    Alcohol use: Not Currently     Comment: rarely    Drug use: Not Currently     Comment: quit 20 years ago      E-Cigarette/Vaping    E-Cigarette Use Never User       E-Cigarette/Vaping Substances    Nicotine No     THC No     CBD No     Flavoring No     Other No     Unknown No       I have reviewed and agree with the history as documented.     59 year old male with a PMH of stroke in October 2024 presents to the ED via EMS for sudden onset nausea, dizziness, and facial droop around 30-40 minutes prior to arrival. EMS was called initially for sudden onset nausea, dizziness, and feeling like he was going to pass out.  Upon arrival to the scene, EMS team noted right-sided facial droop and some slurred speech.  Per EMS, patient was normotensive with a blood glucose of 161. Patient states that the right sided facial weakness and slurred speech is slightly worse than his normal baseline, but states that his residual right arm and mild right leg weakness is the same from his previous stroke in October 2024.  Denies any recent illnesses, fevers, chills, chest pain, shortness of breath, abdominal pain, diarrhea, hematuria, or dysuria.  Takes daily 81 mg aspirin, no other blood thinner use.      STROKE Alert  Associated symptoms: headaches    Associated symptoms: no abdominal pain, no chest pain, no diarrhea, no fever, no myalgias, no nausea, no rash, no shortness of breath and no vomiting        Review of Systems   Constitutional:  Negative for chills and fever.   Respiratory:  Negative for shortness of breath.    Cardiovascular:  Negative for chest pain.   Gastrointestinal:  Negative for abdominal pain, diarrhea, nausea and vomiting.   Genitourinary:  Negative for dysuria and hematuria.   Musculoskeletal:  Negative for arthralgias and myalgias. "   Skin:  Negative for rash and wound.   Neurological:  Positive for dizziness, speech difficulty, weakness and headaches.   Psychiatric/Behavioral:  Negative for confusion.    All other systems reviewed and are negative.          Objective       ED Triage Vitals   Temperature Pulse Blood Pressure Respirations SpO2 Patient Position - Orthostatic VS   02/10/25 1520 02/10/25 1507 02/10/25 1507 02/10/25 1507 02/10/25 1507 02/10/25 1515   98 °F (36.7 °C) 83 148/84 16 96 % Lying      Temp Source Heart Rate Source BP Location FiO2 (%) Pain Score    02/10/25 1520 02/10/25 1507 -- -- 02/10/25 1600    Oral Monitor   No Pain      Vitals      Date and Time Temp Pulse SpO2 Resp BP Pain Score FACES Pain Rating User   02/10/25 2100 -- 72 100 % 18 156/88 -- --    02/10/25 2000 98 °F (36.7 °C) 74 100 % 18 175/108 -- --    02/10/25 1900 -- 80 100 % -- 173/83 -- -- KL   02/10/25 1800 -- 85 99 % 18 173/85 No Pain --    02/10/25 1700 -- 85 99 % -- 163/95 No Pain --    02/10/25 1600 -- 75 99 % 18 143/80 No Pain -- KL   02/10/25 1545 -- 71 97 % 16 140/76 -- -- DB   02/10/25 1530 -- 70 98 % 16 136/78 -- -- CR   02/10/25 1520 98 °F (36.7 °C) -- -- -- -- -- -- CR   02/10/25 1515 -- 78 98 % 16 128/69 -- -- CR   02/10/25 1507 -- 83 96 % 16 148/84 -- -- CR            Physical Exam  Vitals and nursing note reviewed.   Constitutional:       General: He is not in acute distress.     Appearance: He is well-developed and normal weight. He is not ill-appearing, toxic-appearing or diaphoretic.   HENT:      Head: Normocephalic and atraumatic.      Right Ear: External ear normal.      Left Ear: External ear normal.      Nose: Nose normal.      Mouth/Throat:      Mouth: Mucous membranes are moist.   Eyes:      General: No scleral icterus.        Right eye: No discharge.         Left eye: No discharge.      Extraocular Movements: Extraocular movements intact.      Conjunctiva/sclera: Conjunctivae normal.   Cardiovascular:      Rate and Rhythm:  Normal rate and regular rhythm.      Heart sounds: Normal heart sounds. No murmur heard.  Pulmonary:      Effort: Pulmonary effort is normal. No respiratory distress.      Breath sounds: Normal breath sounds. No wheezing, rhonchi or rales.   Abdominal:      General: Abdomen is flat.      Palpations: Abdomen is soft.      Tenderness: There is no abdominal tenderness. There is no guarding or rebound.   Musculoskeletal:         General: Normal range of motion.      Cervical back: Normal range of motion.      Right lower leg: No edema.      Left lower leg: No edema.   Skin:     General: Skin is warm and dry.      Capillary Refill: Capillary refill takes less than 2 seconds.   Neurological:      Mental Status: He is alert and oriented to person, place, and time.      Cranial Nerves: Dysarthria and facial asymmetry present.      Sensory: Sensation is intact.      Motor: Weakness present.      Comments: Right sided facial droop, slightly worse than baseline.  Right upper and lower extremity weakness, consistent with baseline.   Psychiatric:         Mood and Affect: Mood normal.         Behavior: Behavior normal.         Results Reviewed       Procedure Component Value Units Date/Time    Fingerstick Glucose (POCT) [[id number]]  (Abnormal) Collected: 02/10/25 2213    Lab Status: Final result Specimen: Blood Updated: 02/10/25 2213     POC Glucose 159 mg/dl     TSH, 3rd generation [[id number]]  (Normal) Collected: 02/10/25 2113    Lab Status: Final result Specimen: Blood from Arm, Left Updated: 02/10/25 2155     TSH 3RD GENERATON 2.288 uIU/mL     Albumin [[id number]]  (Abnormal) Collected: 02/10/25 2113    Lab Status: Final result Specimen: Blood from Arm, Left Updated: 02/10/25 2139     Albumin 3.0 g/dL     Lipid panel [[id number]]  (Abnormal) Collected: 02/10/25 2113    Lab Status: Final result Specimen: Blood from Arm, Left Updated: 02/10/25 2139     Cholesterol 129 mg/dL      Triglycerides 89 mg/dL      HDL, Direct 35 mg/dL       LDL Calculated 76 mg/dL      Non-HDL-Chol (CHOL-HDL) 94 mg/dl     Potassium [[id number]]     Lab Status: No result Specimen: Blood     Calcium, ionized [[id number]]     Lab Status: No result Specimen: Blood     Magnesium [[id number]]     Lab Status: No result Specimen: Blood     HS Troponin I 4hr [[id number]]  (Normal) Collected: 02/10/25 1933    Lab Status: Final result Specimen: Blood from Arm, Left Updated: 02/10/25 2000     hs TnI 4hr 8 ng/L      Delta 4hr hsTnI 3 ng/L     Hemoglobin A1C [[id number]] Collected: 02/10/25 1515    Lab Status: In process Specimen: Blood from Arm, Left Updated: 02/10/25 1947    Calcium, ionized [[id number]]  (Abnormal) Collected: 02/10/25 1933    Lab Status: Final result Specimen: Blood from Arm, Left Updated: 02/10/25 1940     Calcium, Ionized 0.97 mmol/L     Magnesium [[id number]]  (Abnormal) Collected: 02/10/25 1515    Lab Status: Final result Specimen: Blood from Arm, Left Updated: 02/10/25 1850     Magnesium 1.2 mg/dL     HS Troponin I 2hr [[id number]]  (Normal) Collected: 02/10/25 1719    Lab Status: Final result Specimen: Blood from Arm, Left Updated: 02/10/25 1758     hs TnI 2hr 5 ng/L      Delta 2hr hsTnI 0 ng/L     COVID19, Influenza A/B, RSV [facility] [[id number]]  (Normal) Collected: 02/10/25 1531    Lab Status: Final result Specimen: Nares from Nose Updated: 02/10/25 1641     SARS-CoV-2 Negative     INFLUENZA A PCR Negative     INFLUENZA B PCR Negative     RSV PCR Negative    Narrative:      This test has been performed using the CoV-2/Flu/RSV plus assay on the [employer] GeneXpert platform. This test has been validated by the  and verified by the performing laboratory.     This test is designed to amplify and detect the following: nucleocapsid (N), envelope (E), and RNA-dependent RNA polymerase (RdRP) genes of the SARS-CoV-2 genome; matrix (M), basic polymerase (PB2), and acidic protein (PA) segments of the influenza A genome; matrix (M) and non-structural protein  (NS) segments of the influenza B genome, and the nucleocapsid genes of RSV A and RSV B.     Positive results are indicative of the presence of Flu A, Flu B, RSV, and/or SARS-CoV-2 RNA. Positive results for SARS-CoV-2 or suspected novel influenza should be reported to state, local, or federal health departments according to local reporting requirements.      All results should be assessed in conjunction with clinical presentation and other laboratory markers for clinical management.     FOR PEDIATRIC PATIENTS - copy/paste COVID Guidelines URL to browser: [email or web address]       HS Troponin 0hr (reflex protocol) [[id number]]  (Normal) Collected: 02/10/25 1515    Lab Status: Final result Specimen: Blood from Arm, Left Updated: 02/10/25 1551     hs TnI 0hr 5 ng/L     Protime-INR [[id number]]  (Abnormal) Collected: 02/10/25 1515    Lab Status: Final result Specimen: Blood from Arm, Left Updated: 02/10/25 1546     Protime 17.1 seconds      INR 1.31    Narrative:      INR Therapeutic Range    Indication                                             INR Range      Atrial Fibrillation                                               2.0-3.0  Hypercoagulable State                                    2.0.2.3  Left Ventricular Asist Device                            2.0-3.0  Mechanical Heart Valve                                  -    Aortic(with afib, MI, embolism, HF, LA enlargement,    and/or coagulopathy)                                     2.0-3.0 (2.5-3.5)     Mitral                                                             2.5-3.5  Prosthetic/Bioprosthetic Heart Valve               2.0-3.0  Venous thromboembolism (VTE: VT, PE        2.0-3.0    APTT [[id number]]  (Normal) Collected: 02/10/25 1515    Lab Status: Final result Specimen: Blood from Arm, Left Updated: 02/10/25 1546     PTT 29 seconds     Ethanol [[id number]]  (Normal) Collected: 02/10/25 1515    Lab Status:  Final result Specimen: Blood from Arm, Left Updated: 02/10/25 1545     Ethanol Lvl <10 mg/dL     Basic metabolic panel [[id number]]  (Abnormal) Collected: 02/10/25 1515    Lab Status: Final result Specimen: Blood from Arm, Left Updated: 02/10/25 1543     Sodium 141 mmol/L      Potassium 2.5 mmol/L      Chloride 118 mmol/L      CO2 16 mmol/L      ANION GAP 7 mmol/L      BUN 16 mg/dL      Creatinine 2.27 mg/dL      Glucose 137 mg/dL      Calcium 6.3 mg/dL      eGFR 30 ml/min/1.73sq m     Narrative:      National Kidney Disease Foundation guidelines for Chronic Kidney Disease (CKD):     Stage 1 with normal or high GFR (GFR > 90 mL/min/1.73 square meters)    Stage 2 Mild CKD (GFR = 60-89 mL/min/1.73 square meters)    Stage 3A Moderate CKD (GFR = 45-59 mL/min/1.73 square meters)    Stage 3B Moderate CKD (GFR = 30-44 mL/min/1.73 square meters)    Stage 4 Severe CKD (GFR = 15-29 mL/min/1.73 square meters)    Stage 5 End Stage CKD (GFR <15 mL/min/1.73 square meters)  Note: GFR calculation is accurate only with a steady state creatinine    Fingerstick Glucose (POCT) [[id number]]  (Abnormal) Collected: 02/10/25 1528    Lab Status: Final result Specimen: Blood Updated: 02/10/25 1529     POC Glucose 150 mg/dl     CBC and Platelet [[id number]]  (Abnormal) Collected: 02/10/25 1515    Lab Status: Final result Specimen: Blood from Arm, Left Updated: 02/10/25 1526     WBC 5.22 Thousand/uL      RBC 2.86 Million/uL      Hemoglobin 7.4 g/dL      Hematocrit 22.4 %      MCV 78 fL      MCH 25.9 pg      MCHC 33.0 g/dL      RDW 14.0 %      Platelets 123 Thousands/uL      MPV 9.6 fL             CTA stroke alert (head/neck)   ED Interpretation by Fernanda De Santiago MD (02/10 1529)   FINDINGS:     CTA NECK  ARCH AND GREAT VESSELS: Mild atherosclerotic changes with no severe stenosis. Common origin of brachiocephalic and left common carotid artery, normal variant.  VERTEBRAL ARTERIES: Patent hypoplastic right and dominant left extracranial  segments.  RIGHT CAROTID: Patent. Minimal calcified atherosclerotic disease in carotid bifurcation. No stenosis.    No dissection. Tortuous right ICA proximal cervical segment  LEFT CAROTID: Patent. Minimal calcified atherosclerotic disease in carotid bifurcation. No stenosis.    No dissection. Tortuous left ICA proximal cervical segment.  NASCET criteria was used to determine the degree of internal carotid artery diameter stenosis.     CTA BRAIN:  INTERNAL CAROTID ARTERIES: Mild calcified atherosclerotic disease in bilateral ICA cavernous segments. No stenosis or occlusion.  ANTERIOR CEREBRAL ARTERY CIRCULATION:  No stenosis or occlusion.  MIDDLE CEREBRAL ARTERY CIRCULATION:  No stenosis or occlusion.  DISTAL VERTEBRAL ARTERIES:    Patent hypoplastic right and dominant left distal vertebral arteries. Hypoplastic right vertebral artery terminates in PICA, normal variant. No stenosis or occlusion.  BASILAR ARTERY:  No stenosis or occlusion.  POSTERIOR CEREBRAL ARTERIES: Severe focal stenosis in bilateral PCA P2 segments likely due to atherosclerotic disease, worsened since 10/14/2024. No occlusion.  VENOUS STRUCTURES:  Normal.     NON VASCULAR ANATOMY     BONY STRUCTURES:  No acute osseous abnormality. Multiple missing teeth. Mild multilevel spinal degenerative changes, worse at C3-C4.     SOFT TISSUES OF THE NECK:  Normal.     THORACIC INLET: Small calcified mediastinal and right hilar lymph nodes, likely sequela of prior granulomatous disease. No focal consolidation in visualized upper lung zones.        IMPRESSION:     Negative CTA head and neck for large vessel occlusion, dissection, or aneurysm.     Severe focal stenosis in bilateral PCA P2 segments likely due to atherosclerotic disease, worsened since 10/14   /2024.     Additional chronic/incidental findings as detailed above.              Findings were directly discussed with Marie Escobar at 3:12 p.m. on 2/10/2025.     Workstation performed: [id number]         Final Interpretation by Gabino Fletcher MD (02/10 1523)      Negative CTA head and neck for large vessel occlusion, dissection, or aneurysm.      Severe focal stenosis in bilateral PCA P2 segments likely due to atherosclerotic disease, worsened since 10/14/2024.      Additional chronic/incidental findings as detailed above.               Findings were directly discussed with Marie Escobar at 3:12 p.m. on 2/10/2025.      Workstation performed: [id number]         CT stroke alert brain   ED Interpretation by Fernanda De Santiago MD (02/10 1529)   FINDINGS:     PARENCHYMA: Decreased attenuation is noted in periventricular and subcortical white matter demonstrating an appearance that is statistically most likely to represent mild microangiopathic change. Unchanged chronic infarcts in left corona radiata and left   striatocapsular regions.     No CT signs of acute infarction.  No intracranial mass, mass effect or midline shift.  No acute parenchymal hemorrhage.     Mild calcification of the cavernous internal carotid arteries.     VENTRICLES AND EXTRA-AXIAL SPACES:  Normal for the patient's age.     VISUALIZED ORBITS:  Sequela of bilateral cataract surgery.     PARANASAL SINUSES:  Normal visualized paranasal sinuses.     CALVARIUM AND EXTRACRANIAL SOFT TISSUES:   Normal.     IMPRESSION:     No acute intracranial abnormality.     Unchanged chronic infarcts in left corona radiata and left striatocapsular regions with mild chronic microangiopathy.     Findings were directly discussed with Marie Escobar at approximately 3:10 p.m on 2/10   /2025.     Workstation performed: [id number]        Final Interpretation by Gabino Fletcher MD (02/10 8004)      No acute intracranial abnormality.      Unchanged chronic infarcts in left corona radiata and left striatocapsular regions with mild chronic microangiopathy.      Findings were directly discussed with Marie Escobar at approximately 3:10 p.m on 2/10/2025.      Workstation  "performed: [id number]         MRI inpatient order    (Results Pending)       ECG 12 Lead Documentation Only    Date/Time: 2/10/2025 4:20 PM    Performed by: Fernanda De Santiago MD  Authorized by: Fernanda De Santiago MD    Indications / Diagnosis:  Stroke-like symptoms  ECG reviewed by me, the ED Provider: yes    Patient location:  ED  Previous ECG:     Previous ECG:  Compared to current    Comparison ECG info:  10/14/2024    Similarity:  No change  Interpretation:     Interpretation: normal    Rate:     ECG rate:  78 bpm    ECG rate assessment: normal    Rhythm:     Rhythm: sinus rhythm    Ectopy:     Ectopy: none    QRS:     QRS axis:  Normal    QRS intervals:  Normal  Conduction:     Conduction: normal    ST segments:     ST segments:  Normal  T waves:     T waves: normal    Comments:      Normal QTc at 462 ms.      ED Medication and Procedure Management   Prior to Admission Medications   Prescriptions Last Dose Informant Patient Reported? Taking?   Blood Glucose Monitoring Suppl (Assure 3 Meter) KIT  Self No No   Sig: Use 1 each 4 (four) times a day Fine to substitute other brand if not covered by insurance   Blood Glucose Monitoring Suppl (Blood Glucose Monitor System) w/Device KIT   No No   Sig: Use 3 (three) times a day   Blood Glucose Monitoring Suppl (FREESTYLE LITE) ANTONIA  Self No No   Sig: by Does not apply route 3 (three) times a day   Blood Glucose Monitoring Suppl (FreeStyle Lite) w/Device KIT   No No   Sig: Use as instructed 4 times a day   Cholecalciferol (VITAMIN D3) 1,000 units tablet   No No   Sig: Take 2 tablets (2,000 Units total) by mouth daily   INSULIN SYRINGE .5CC/29G 29G X 1/2\" 0.5 ML MISC 2/10/2025  No Yes   Sig: Use 2 (two) times a day   Insulin Pen Needle 31G X 8 MM MISC 2/10/2025  No Yes   Sig: Check sugars three times a day   Lancets (freestyle) lancets 2/10/2025  No Yes   Sig: Use as instructed   Lancets MISC   No No   Sig: Use 3 (three) times a day   NIFEdipine (ADALAT CC) 90 mg 24 hr " tablet 2/10/2025  No Yes   Sig: Take 1 tablet (90 mg total) by mouth daily   acetaminophen (TYLENOL) 325 mg tablet Past Month Self No Yes   Sig: Take 2 tablets (650 mg total) by mouth every 6 (six) hours as needed for mild pain, fever or headaches   ammonium lactate (LAC-HYDRIN) 12 % cream More than a month Self No No   Sig: Apply topically as needed for dry skin   Patient not taking: Reported on 1/6/2025   aspirin 81 mg EC tablet 2/10/2025  No Yes   Sig: Take 1 tablet (81 mg total) by mouth daily   atenolol (TENORMIN) 25 mg tablet 2/10/2025  No Yes   Sig: Take 1 tablet (25 mg total) by mouth daily   atorvastatin (LIPITOR) 20 mg tablet Unknown  No No   Sig: Take 1 tablet (20 mg total) by mouth daily   calcium carbonate (TUMS) 500 mg chewable tablet More than a month Self No No   Sig: Chew 2 tablets (1,000 mg total) 2 (two) times a day as needed for indigestion or heartburn   Patient not taking: Reported on 1/6/2025   ergocalciferol (VITAMIN D2) 50,000 units 2/10/2025 Self No Yes   Sig: Take 1 capsule (50,000 Units total) by mouth 3 (three) times a week   glucose blood (FREESTYLE LITE) test strip 2/10/2025  No Yes   Sig: CHECK BLOOD SUGARS FOUR TIMES DAILY   glucose blood test strip 2/10/2025  No Yes   Sig: CHECK BLOOD GLUCOSE 3 TIMES DAILY   hydrALAZINE (APRESOLINE) 10 mg tablet Past Week  Yes Yes   Sig: Take 10 mg by mouth 3 (three) times a day   insulin NPH-insulin regular (NovoLIN 70/30 ReliOn) 100 units/mL subcutaneous injection 2/9/2025  No Yes   Sig: Inject 20 Units under the skin daily before breakfast AND 10 Units in the evening. Take before meals. Use 20 units with breakfast and 10 units with dinner.   magnesium gluconate (MAGONATE) 500 MG tablet Unknown Self No No   Sig: Take 1 tablet (500 mg total) by mouth daily Do not start before November 16, 2024.   Patient not taking: Reported on 1/28/2025   omeprazole (PriLOSEC) 20 mg delayed release capsule 2/10/2025  Yes Yes   Sig: Take 20 mg by mouth daily       Facility-Administered Medications: None     Patient's Medications   Discharge Prescriptions    No medications on file     No discharge procedures on file.  ED SEPSIS DOCUMENTATION   Time reflects when diagnosis was documented in both MDM as applicable and the Disposition within this note       Time User Action Codes Description Comment    2/10/2025  2:58 PM Fernanda De Santiago [R29.90] Stroke-like symptoms     2/10/2025  4:47 PM Richard North [D64.9] Symptomatic anemia     2/10/2025  4:47 PM Richard North [E87.6] Hypokalemia                  Fernanda De Santiago MD  02/10/25 5117

## 2025-02-10 NOTE — ED ATTENDING ATTESTATION
2/10/2025  IRichard DO, saw and evaluated the patient. I have discussed the patient with the resident/non-physician practitioner and agree with the resident's/non-physician practitioner's findings, Plan of Care, and MDM as documented in the resident's/non-physician practitioner's note, except where noted. All available labs and Radiology studies were reviewed.  I was present for key portions of any procedure(s) performed by the resident/non-physician practitioner and I was immediately available to provide assistance.       At this point I agree with the current assessment done in the Emergency Department.  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 On arrival, pt w/ rt sided facial droop which he initially stated as new. Later, after presenting pt with chart review, reviewed with pt that he has known rt sided facial droop. He's unable to determine if better or worse at this time. Denies vomiting, diarrhea, fever, chills, CP, SOB, abd pain, change in bowel or bladder.     VSS. Pt resting comfortably. Rt sided mild facial droop with loss of nasolabial fold. AAOX4. Gross vision intact all 4 quadrants. Cerebellar signs with finger to nose and heel to shin intact. All extremities 5/5 strength with exception to RUE 4/5. Gross sensation appreciated face and extremities. Lungs CTA. No WOB. Heart RRR. Abd soft nontender.     MDM: 59yoM presenting to ER with question worse deficits from prior CVA, question recrudescence. CTA without acute patho. CBC w/ worsening anemia and episode while standing likely 2/2 same so will dx as symptomatic anemia and given vasculopath, will transfuse. EKG performed x2 for improved baseline which ultimately was NSR without acute changes. CMP with hypoK and likely hypoMg given hx. Repleted both. Neuro requests  325 ASA and admit stroke pathway given unclear if worse deficits from prior. NIH minimal and given unclear if new, no tPA/tNK. Call placed to hospital medicine who accept to their care. Given question of worse deficits, Stroke Alert was called.     Critical Care Time Statement: Upon my evaluation, this patient had a high probability of imminent or life-threatening deterioration due to symptomatic anemia requiring blood transfusion, which required my direct attention, intervention, and personal management.  I spent a total of 30 minutes directly providing critical care services, including  providing blood products . This time is exclusive of procedures, teaching, treating other patients, family meetings, and any prior time recorded by providers other than myself.           ED Course  ED Course as of 02/10/25 1726   Mon Feb 10, 2025   1502 NIH 1. Stroke neurologist made aware.    1515 Scans completed. Neuro at bedside.    1517 Pt initially stated no hx facial droop. Chart states rt sided facial droop. Reviewed again w/ pt and ultimately he states unclear as to whether facial droop new or old. 325 ASA per neuro and admit for stroke pathway. Recrudescence of previous lesion also in Ddx.          Critical Care Time  Procedures

## 2025-02-10 NOTE — CONSULTS
NEUROLOGY RESIDENCY CONSULT NOTE     Name: Tommie Taylor   Age & Sex: 59 y.o. male   MRN: [id number]  Unit/Bed#: TR-01   Encounter: [id number]  Length of Stay: 0    Recommendations for outpatient neurological follow up have yet to be determined.      ASSESSMENT & PLAN     Assessment & Plan  Stroke-like symptoms  Tommie is a 59-year-old male with history of HTN, HLD, type 2 diabetes, prior left corona radiata infarct on aspirin 81 mg daily with residual RUE/RLE LUE weakness who presented to the hospital on 2/10 initially with nausea and vomiting while at [employer], but on his way into the ED, developed right-sided facial weakness and slurred speech.  Reports that he had the symptoms previously when he had the acute left frontal radiata infarct in October 2024.  No recent illness otherwise.  Stroke alert was called at 3 PM.  Last known well about 5 to 10 minutes prior.  Initial blood pressure 137/78, glucose 161.  NIHSS 4. MRS 4.  CT head showed no acute intracranial abnormality.  Unchanged chronic infarct in left corona radiata and left striatocapsular regions with mild chronic microangiopathy.  CTA head and neck showed no LVO, dissection, aneurysm.  Does show severe focal stenosis in bilateral P2 segments, likely due to atherosclerotic disease, worsened since 10/14/2024. No TNK due to non-disabling symptoms (R facial weakness, mild dysarthria). No thrombectomy due to no LVO. Admitted under stroke pathway and further medical workup.   Vascular risk factors: HTN, HLD, T2DM, former smoker  Baseline: 4/5 RUE/RLE weakness. Pt unsure if has been experiencing R facial weakness and dysarthria recently. But interestingly, per 1/6/25 neurology OP note - pt noted to have R facial weakness, dysarthria, 4/5 RLE, 3/5 RUE.  MRS 4    Assessment: Suspect recrudescence of prior stroke symptoms in setting of physiologic stress (anemia, hypokalemia) vs acute stroke.     Plan, stroke pathway:  Discussed with neurology attending,  Dr. Escobar  MRI brain pending - if pts' symptoms do resolve to baseline with correction of metabolic issues prior to MRI brain, reasonable to d/c stroke pathway and cancel MRI order.   TTE pending  Lipid panel, A1c pending  Permissive HTN <220/120 for first 24 hours, and then gradual return to normotension  Bedside swallow eval  Load ASA 324mg x 1, followed by ASA 81mg daily  Atorvastatin 40mg QHS  Glucose <180   Neuro checks- Every 1 hour x 4 hours, then every 2 hours x 4 hours, then every 4 hours x 72 hours     Stat CT Head for change in neuro status.  Monitor on telemetry  Also recommend obtaining orthostatic vitals prior to IV fluids.  DVT ppx per primary team, SCDs  PT/OT/ST/PM&R evaluations   Medical management as per primary team appreciated.           SUBJECTIVE     Reason for Consult / Principal Problem: Right-sided facial weakness, slurred speech  Hx and PE limited by: None    HPI: Tommie Taylor is a 59-year-old male with history of HTN, HLD, type 2 diabetes, prior left corona radiata infarct on aspirin 81 mg daily with residual RUE/RLE LUE weakness who presented to the hospital on 2/10 initially with nausea and vomiting while at [employer], but on his way into the ED, developed right-sided facial weakness and slurred speech.  Reports that he had the symptoms previously when he had the acute left frontal radiata infarct in October 2024.  No recent illness otherwise.  Stroke alert was called at 3 PM.  Last known well about 5 to 10 minutes prior.  Initial blood pressure 137/78, glucose 161.  NIHSS 4. MRS 4.  CT head showed no acute intracranial abnormality.  Unchanged chronic infarct in left corona radiata and left striatocapsular regions with mild chronic microangiopathy.  CTA head and neck showed no LVO, dissection, aneurysm.  Does show severe focal stenosis in bilateral P2 segments, likely due to atherosclerotic disease, worsened since 10/14/2024. No TNK due to non-disabling symptoms (R facial weakness,  mild dysarthria). No thrombectomy due to no LVO. Admitted under stroke pathway and further medical workup. Upon further clarification with patient, R facial weakness and dysarthria are not new but are worse compared to baseline.    Labs significant for K 2.5, Hgb 7.4 (baseline 9.3-10.9).    Lab Results   Component Value Date    CHOLESTEROL 154 10/16/2024    TRIG 120 10/16/2024    HDL 40 10/16/2024    LDLCALC 90 10/16/2024     Lab Results   Component Value Date    HGBA1C 6.5 02/05/2025     Lab Results   Component Value Date    [id number] 4.847 (H) 10/07/2024    FREET4 0.68 10/07/2024     Inpatient consult to Neurology  Consult performed by: Kaykay Ch DO  Consult ordered by: Richard North DO        Historical Information   Past Medical History:   Diagnosis Date    ADHD, adult residual type     Anxiety     Anxiety     Bipolar 1 disorder (HCC)     Cataract     Left eye    Chronic kidney disease     Colon polyp     CPAP (continuous positive airway pressure) dependence     Depression     Depression     Diabetes mellitus (HCC)     blood sugar 167 on admission    Equinus deformity of foot     GERD (gastroesophageal reflux disease)     Homicidal ideations     Hyperlipidemia     Hypertension     Microalbuminuria     Morbid obesity (HCC)     Neuropathy     Shortness of breath     Sleep apnea     CPAP at bedtime    Sleep apnea     Stroke (HCC)     Suicidal intent      Past Surgical History:   Procedure Laterality Date    CATARACT EXTRACTION      CATARACT EXTRACTION      COLONOSCOPY      HAND SURGERY Right     OTHER SURGICAL HISTORY      REPAIR OF SUPERFICIAL WOUND FACE    AK ESOPHAGOGASTRODUODENOSCOPY TRANSORAL DIAGNOSTIC N/A 06/14/2018    Procedure: ESOPHAGOGASTRODUODENOSCOPY (EGD);  Surgeon: Yinka Maier MD;  Location: [facility];  Service: Gastroenterology    AK ESOPHAGOGASTRODUODENOSCOPY TRANSORAL DIAGNOSTIC N/A 09/12/2018    Procedure: EGD AND COLONOSCOPY;  Surgeon: Yinka Maier MD;  Location: [facility];  Service:  "Gastroenterology     Social History   Social History     Substance and Sexual Activity   Alcohol Use Not Currently    Comment: rarely     Social History     Substance and Sexual Activity   Drug Use Not Currently    Comment: quit 20 years ago     E-Cigarette/Vaping    E-Cigarette Use Never User      E-Cigarette/Vaping Substances    Nicotine No     THC No     CBD No     Flavoring No     Other No     Unknown No      Social History     Tobacco Use   Smoking Status Former    Current packs/day: 0.00    Types: Cigarettes    Quit date:     Years since quittin.1   Smokeless Tobacco Current    Types: Chew   Tobacco Comments    pt \"Quit after approx over 25 years ago\"     Family History:   Family History   Problem Relation Age of Onset    Diabetes Mother     Alcohol abuse Father     Diabetes Father     Hypertension Father     Lung cancer Father     Stroke Father     Cancer Father         lung    Alcohol abuse Sister     Depression Sister     Lymphoma Sister     Alcohol abuse Family     Alcohol abuse Sister     Alcohol abuse Sister     Cancer Sister     Heart disease Neg Hx     Thyroid disease Neg Hx      Meds/Allergies   all current active meds have been reviewed, current meds: No current facility-administered medications for this encounter., and PTA meds:   Prior to Admission Medications   Prescriptions Last Dose Informant Patient Reported? Taking?   Blood Glucose Monitoring Suppl (Assure 3 Meter) KIT  Self No No   Sig: Use 1 each 4 (four) times a day Fine to substitute other brand if not covered by insurance   Blood Glucose Monitoring Suppl (Blood Glucose Monitor System) w/Device KIT   No No   Sig: Use 3 (three) times a day   Blood Glucose Monitoring Suppl (FREESTYLE LITE) ANTONIA  Self No No   Sig: by Does not apply route 3 (three) times a day   Blood Glucose Monitoring Suppl (FreeStyle Lite) w/Device KIT   No No   Sig: Use as instructed 4 times a day   Cholecalciferol (VITAMIN D3) 1,000 units tablet   No No   Sig: " "Take 2 tablets (2,000 Units total) by mouth daily   INSULIN SYRINGE .5CC/29G 29G X 1/2\" 0.5 ML MISC   No No   Sig: Use 2 (two) times a day   Insulin Pen Needle 31G X 8 MM MISC   No No   Sig: Check sugars three times a day   Lancets (freestyle) lancets   No No   Sig: Use as instructed   Lancets MISC   No No   Sig: Use 3 (three) times a day   NIFEdipine (ADALAT CC) 90 mg 24 hr tablet   No No   Sig: Take 1 tablet (90 mg total) by mouth daily   acetaminophen (TYLENOL) 325 mg tablet  Self No No   Sig: Take 2 tablets (650 mg total) by mouth every 6 (six) hours as needed for mild pain, fever or headaches   Patient not taking: Reported on 1/6/2025   ammonium lactate (LAC-HYDRIN) 12 % cream  Self No No   Sig: Apply topically as needed for dry skin   Patient not taking: Reported on 1/6/2025   aspirin 81 mg EC tablet   No No   Sig: Take 1 tablet (81 mg total) by mouth daily   atenolol (TENORMIN) 25 mg tablet   No No   Sig: Take 1 tablet (25 mg total) by mouth daily   atorvastatin (LIPITOR) 20 mg tablet   No No   Sig: Take 1 tablet (20 mg total) by mouth daily   calcium carbonate (TUMS) 500 mg chewable tablet  Self No No   Sig: Chew 2 tablets (1,000 mg total) 2 (two) times a day as needed for indigestion or heartburn   Patient not taking: Reported on 1/6/2025   ergocalciferol (VITAMIN D2) 50,000 units  Self No No   Sig: Take 1 capsule (50,000 Units total) by mouth 3 (three) times a week   Patient not taking: Reported on 1/28/2025   glucose blood (FREESTYLE LITE) test strip   No No   Sig: CHECK BLOOD SUGARS FOUR TIMES DAILY   Patient not taking: Reported on 1/28/2025   glucose blood test strip   No No   Sig: CHECK BLOOD GLUCOSE 3 TIMES DAILY   Patient not taking: Reported on 1/28/2025   insulin NPH-insulin regular (NovoLIN 70/30 ReliOn) 100 units/mL subcutaneous injection   No No   Sig: Inject 20 Units under the skin daily before breakfast AND 10 Units in the evening. Take before meals. Use 20 units with breakfast and 10 units " with dinner.   magnesium gluconate (MAGONATE) 500 MG tablet  Self No No   Sig: Take 1 tablet (500 mg total) by mouth daily Do not start before 2024.   Patient not taking: Reported on 2025      Facility-Administered Medications: None     Allergies   Allergen Reactions    Pollen Extract Nasal Congestion    Tetanus Toxoid Swelling       Review of previous medical records was completed.       Review of Systems   Constitutional:  Negative for chills, diaphoresis and fever.   Eyes:  Positive for visual disturbance. Negative for photophobia.   Respiratory:  Negative for cough and shortness of breath.    Cardiovascular:  Negative for chest pain.   Gastrointestinal:  Positive for nausea. Negative for abdominal pain and diarrhea.   Musculoskeletal:  Positive for gait problem.   Neurological:  Positive for dizziness, facial asymmetry, speech difficulty, weakness and light-headedness. Negative for syncope and numbness.   All other systems reviewed and are negative.      OBJECTIVE     Vitals:   Vitals:    02/10/25 1507 02/10/25 1515 02/10/25 1520   BP: 148/84 128/69    Pulse: 83 78    Resp: 16 16    Temp:   98 °F (36.7 °C)   TempSrc:   Oral   SpO2: 96% 98%    Weight:   121 kg (265 lb 10.5 oz)      Body mass index is 42.88 kg/m².   No intake or output data in the 24 hours ending 02/10/25 1527    Temperature:   Temp (24hrs), Av °F (36.7 °C), Min:98 °F (36.7 °C), Max:98 °F (36.7 °C)    Temperature: 98 °F (36.7 °C)    Invasive Devices:   Invasive Devices       Peripheral Intravenous Line  Duration             Peripheral IV 02/10/25 Left;Ventral (anterior) External Jugular <1 day                    Physical Exam   Vitals reviewed.   Constitutional:    Not in acute distress. Appears slightly fatigued. Non-toxic appearing.   HENT:   Normocephalic and atraumatic.  External ear normal b/l. Nose normal. Mucous membranes are dry.  Oropharynx is clear. No oropharyngeal exudate or posterior oropharyngeal erythema.    Eyes:    No scleral icterus.  No discharge b/l.  Conjunctivae normal.   Cardiovascular: no clear edema  Pulmonary:  No respiratory distress.   Musculoskeletal: no gross deformities  Skin:    Skin is not pale.     Neurologic Exam   MENTAL STATUS: AAOx3. Follows simple/complex commands. Affect normal w/ congruent mood.    LANGUAGE: Speech spontaneous and fluent. No aphasia -  naming, repetition, comprehension, reading intact. Mild to moderate dysarthria.    CRANIAL NERVES:  CN II: VF deficit in upper and lower nasal fields of L eye; otherwise, normal VF in all 4 quadrants of R eye and upper & lower temporal quadrants of L eye. PERRL - 1mm b/l.    CN III, IV, VI: EOM's intact w/o nystagmus, gaze palsy, or preference.   CN V: Normal masseter bulk. V1-V3 sensation intact and symmetric bilaterally.   CN VII: R lower facial weakness at rest and with activation. No upper facial weakness noted.   CN VIII: Hearing grossly intact bilaterally.   CN IX-X: Mild to moderate dysarthria. Palate elevates symmetrically. Uvula midline.   CN XI: Shoulder shrug symmetric.   CN XII: Tongue midline. No deviation, atrophy, or fasciculations.    MOTOR: Normal muscle bulk. Normal tone. No tremors or abnormal involuntary movements appreciated. Formal strength testing as follows:  Upper extremity:   Shoulder abduction Elbow flexion Elbow extension  strength   Right 4-/5 3+/5 3+/5 4/5   Left 5/5 5/5 5/5 5/5     Lower extremity:   Hip flexion Knee flexion Knee extension Ankle dorsiflexion Ankle plantarflexion   Right 5-/5 5-/5 5-/5 5/5 5/5   Left 5/5 5/5 5/5 5/5 5/5     SENSORY:  Light touch: Intact and symmetric throughout.  Pinprick: Intact and symmetric throughout.    REFLEXES:   Biceps Triceps Brachioradialis Patellar   Right 1+ 1+ 1+ 1+   Left 1+ 1+ 1+ 1+     COORDINATION: No ataxia/dysmetria noted with L FNF and b/l HTS. Unable to test R FNF due to weakness.    GAIT: Deferred      LABORATORY DATA     Labs:   Results from last 7 days  "  Lab Units 02/10/25  1515   WBC Thousand/uL 5.22   HEMOGLOBIN g/dL 7.4*   HEMATOCRIT % 22.4*   PLATELETS Thousands/uL 123*          Invalid input(s): \"LABALBU\"                         IMAGING & DIAGNOSTIC TESTING     Radiology Results:   CTA stroke alert (head/neck)   Final Result by Gabino Fletcher MD (02/10 1523)      Negative CTA head and neck for large vessel occlusion, dissection, or aneurysm.      Severe focal stenosis in bilateral PCA P2 segments likely due to atherosclerotic disease, worsened since 10/14/2024.      Additional chronic/incidental findings as detailed above.               Findings were directly discussed with Marie Escobar at 3:12 p.m. on 2/10/2025.      Workstation performed: [id number]         CT stroke alert brain   Final Result by Gabino Fletcher MD (02/10 1511)      No acute intracranial abnormality.      Unchanged chronic infarcts in left corona radiata and left striatocapsular regions with mild chronic microangiopathy.      Findings were directly discussed with Marie Escobar at approximately 3:10 p.m on 2/10/2025.      Workstation performed: [id number]             ACTIVE MEDICATIONS       Current Facility-Administered Medications:     acetaminophen (TYLENOL) tablet 975 mg, Once    Prior to Admission medications    Medication Sig Start Date End Date Taking? Authorizing Provider   acetaminophen (TYLENOL) 325 mg tablet Take 2 tablets (650 mg total) by mouth every 6 (six) hours as needed for mild pain, fever or headaches  Patient not taking: Reported on 1/6/2025 10/26/24   Ale Sanchez MD   ammonium lactate (LAC-HYDRIN) 12 % cream Apply topically as needed for dry skin  Patient not taking: Reported on 1/6/2025 9/25/24   Layton Flores MD   aspirin 81 mg EC tablet Take 1 tablet (81 mg total) by mouth daily 2/5/25 5/6/25  Mali Traylor MD   atenolol (TENORMIN) 25 mg tablet Take 1 tablet (25 mg total) by mouth daily 2/5/25   Mali Traylor MD " "  atorvastatin (LIPITOR) 20 mg tablet Take 1 tablet (20 mg total) by mouth daily 2/5/25   Mali Traylor MD   Blood Glucose Monitoring Suppl (Assure 3 Meter) KIT Use 1 each 4 (four) times a day Fine to substitute other brand if not covered by insurance 12/18/24   Abner Simon MD   Blood Glucose Monitoring Suppl (Blood Glucose Monitor System) w/Device KIT Use 3 (three) times a day 1/8/25   Santy Donaldson MD   Blood Glucose Monitoring Suppl (FREESTYLE LITE) ANTONIA by Does not apply route 3 (three) times a day 3/22/19   Perla Fraire PA-C   Blood Glucose Monitoring Suppl (FreeStyle Lite) w/Device KIT Use as instructed 4 times a day 1/7/25   Fracisco Barraza MD   calcium carbonate (TUMS) 500 mg chewable tablet Chew 2 tablets (1,000 mg total) 2 (two) times a day as needed for indigestion or heartburn  Patient not taking: Reported on 1/6/2025 10/26/24   Ale Sanchez MD   Cholecalciferol (VITAMIN D3) 1,000 units tablet Take 2 tablets (2,000 Units total) by mouth daily 2/5/25 5/6/25  Mali Traylor MD   ergocalciferol (VITAMIN D2) 50,000 units Take 1 capsule (50,000 Units total) by mouth 3 (three) times a week  Patient not taking: Reported on 1/28/2025 11/20/24   Cathy Miranda MD   glucose blood (FREESTYLE LITE) test strip CHECK BLOOD SUGARS FOUR TIMES DAILY  Patient not taking: Reported on 1/28/2025 1/7/25   Fracisco Barraza MD   glucose blood test strip CHECK BLOOD GLUCOSE 3 TIMES DAILY  Patient not taking: Reported on 1/28/2025 1/8/25   Santy Donaldson MD   insulin NPH-insulin regular (NovoLIN 70/30 ReliOn) 100 units/mL subcutaneous injection Inject 20 Units under the skin daily before breakfast AND 10 Units in the evening. Take before meals. Use 20 units with breakfast and 10 units with dinner. 2/5/25   Tamara Amin, DO   Insulin Pen Needle 31G X 8 MM MISC Check sugars three times a day 2/5/25   Tamara Amin, DO   INSULIN SYRINGE .5CC/29G 29G X 1/2\" 0.5 ML MISC Use 2 (two) times a day " 2/5/25 5/6/25  Tamara Amin DO   Lancets (freestyle) lancets Use as instructed 1/7/25   Fracisco Barraza MD   Lancets MISC Use 3 (three) times a day 1/9/25   Layton lFores MD   magnesium gluconate (MAGONATE) 500 MG tablet Take 1 tablet (500 mg total) by mouth daily Do not start before November 16, 2024.  Patient not taking: Reported on 1/28/2025 11/16/24   Cathy Miranda MD   NIFEdipine (ADALAT CC) 90 mg 24 hr tablet Take 1 tablet (90 mg total) by mouth daily 2/5/25   Mali Traylor MD       CODE STATUS & ADVANCED DIRECTIVES     Code Status: Prior  Advance Directive and Living Will:      Power of :    POLST:        VTE Pharmacologic Prophylaxis: Defer to primary team  VTE Mechanical Prophylaxis: sequential compression device    ======    I have discussed the patient's history, physical exam findings, assessment, and plan in detail with attending, Dr. Escobar    Thank you for allowing me to participate in the care of your patient, Tommie Taylor.    Kaykay Ch, DO  [facility], PGY-3

## 2025-02-11 ENCOUNTER — APPOINTMENT (INPATIENT)
Dept: MRI IMAGING | Facility: HOSPITAL | Age: 60
DRG: 092 | End: 2025-02-11
Payer: MEDICARE

## 2025-02-11 ENCOUNTER — APPOINTMENT (INPATIENT)
Dept: NON INVASIVE DIAGNOSTICS | Facility: HOSPITAL | Age: 60
DRG: 092 | End: 2025-02-11
Payer: MEDICARE

## 2025-02-11 ENCOUNTER — PATIENT OUTREACH (OUTPATIENT)
Dept: INTERNAL MEDICINE CLINIC | Facility: CLINIC | Age: 60
End: 2025-02-11

## 2025-02-11 VITALS
WEIGHT: 257.94 LBS | DIASTOLIC BLOOD PRESSURE: 78 MMHG | BODY MASS INDEX: 41.45 KG/M2 | HEART RATE: 99 BPM | HEIGHT: 66 IN | RESPIRATION RATE: 18 BRPM | OXYGEN SATURATION: 98 % | SYSTOLIC BLOOD PRESSURE: 141 MMHG | TEMPERATURE: 98.1 F

## 2025-02-11 LAB
ALBUMIN SERPL BCG-MCNC: 3.1 G/DL (ref 3.5–5)
ALP SERPL-CCNC: 66 U/L (ref 34–104)
ALT SERPL W P-5'-P-CCNC: 7 U/L (ref 7–52)
ANION GAP SERPL CALCULATED.3IONS-SCNC: 6 MMOL/L (ref 4–13)
AORTIC ROOT: 3.3 CM
ASCENDING AORTA: 3.4 CM
AST SERPL W P-5'-P-CCNC: 11 U/L (ref 13–39)
ATRIAL RATE: 78 BPM
ATRIAL RATE: 83 BPM
AV REGURGITATION PRESSURE HALF TIME: 967 MS
BASOPHILS # BLD AUTO: 0.04 THOUSANDS/ÂΜL (ref 0–0.1)
BASOPHILS NFR BLD AUTO: 1 % (ref 0–1)
BILIRUB SERPL-MCNC: 0.33 MG/DL (ref 0.2–1)
BSA FOR ECHO PROCEDURE: 2.23 M2
BUN SERPL-MCNC: 20 MG/DL (ref 5–25)
CA-I BLD-SCNC: 1.19 MMOL/L (ref 1.12–1.32)
CALCIUM ALBUM COR SERPL-MCNC: 9.9 MG/DL (ref 8.3–10.1)
CALCIUM SERPL-MCNC: 9.2 MG/DL (ref 8.4–10.2)
CHLORIDE SERPL-SCNC: 110 MMOL/L (ref 96–108)
CO2 SERPL-SCNC: 21 MMOL/L (ref 21–32)
CREAT SERPL-MCNC: 3.04 MG/DL (ref 0.6–1.3)
E WAVE DECELERATION TIME: 265 MS
E/A RATIO: 0.9
EOSINOPHIL # BLD AUTO: 0.12 THOUSAND/ÂΜL (ref 0–0.61)
EOSINOPHIL NFR BLD AUTO: 2 % (ref 0–6)
ERYTHROCYTE [DISTWIDTH] IN BLOOD BY AUTOMATED COUNT: 14.1 % (ref 11.6–15.1)
EST. AVERAGE GLUCOSE BLD GHB EST-MCNC: 146 MG/DL
FERRITIN SERPL-MCNC: 161 NG/ML (ref 24–336)
FRACTIONAL SHORTENING: 32 (ref 28–44)
GFR SERPL CREATININE-BSD FRML MDRD: 21 ML/MIN/1.73SQ M
GLUCOSE SERPL-MCNC: 137 MG/DL (ref 65–140)
GLUCOSE SERPL-MCNC: 80 MG/DL (ref 65–140)
GLUCOSE SERPL-MCNC: 84 MG/DL (ref 65–140)
HBA1C MFR BLD: 6.7 %
HCT VFR BLD AUTO: 32 % (ref 36.5–49.3)
HGB BLD-MCNC: 10.5 G/DL (ref 12–17)
IMM GRANULOCYTES # BLD AUTO: 0.02 THOUSAND/UL (ref 0–0.2)
IMM GRANULOCYTES NFR BLD AUTO: 0 % (ref 0–2)
INTERVENTRICULAR SEPTUM IN DIASTOLE (PARASTERNAL SHORT AXIS VIEW): 1.8 CM
INTERVENTRICULAR SEPTUM: 1.8 CM (ref 0.6–1.1)
IRON SATN MFR SERPL: 43 % (ref 15–50)
IRON SERPL-MCNC: 74 UG/DL (ref 50–212)
LAAS-AP2: 20.2 CM2
LAAS-AP4: 20.5 CM2
LEFT ATRIUM SIZE: 4.6 CM
LEFT ATRIUM VOLUME (MOD BIPLANE): 68 ML
LEFT ATRIUM VOLUME INDEX (MOD BIPLANE): 30.5 ML/M2
LEFT INTERNAL DIMENSION IN SYSTOLE: 2.6 CM (ref 2.1–4)
LEFT VENTRICULAR INTERNAL DIMENSION IN DIASTOLE: 3.8 CM (ref 3.5–6)
LEFT VENTRICULAR POSTERIOR WALL IN END DIASTOLE: 1.5 CM
LEFT VENTRICULAR STROKE VOLUME: 39 ML
LV EF US.2D.A4C+ESTIMATED: 60 %
LVSV (TEICH): 39 ML
LYMPHOCYTES # BLD AUTO: 1.61 THOUSANDS/ÂΜL (ref 0.6–4.47)
LYMPHOCYTES NFR BLD AUTO: 25 % (ref 14–44)
MAGNESIUM SERPL-MCNC: 2 MG/DL (ref 1.9–2.7)
MCH RBC QN AUTO: 25.7 PG (ref 26.8–34.3)
MCHC RBC AUTO-ENTMCNC: 33.3 G/DL (ref 31.4–37.4)
MCV RBC AUTO: 77 FL (ref 82–98)
MONOCYTES # BLD AUTO: 0.49 THOUSAND/ÂΜL (ref 0.17–1.22)
MONOCYTES NFR BLD AUTO: 8 % (ref 4–12)
MV E'TISSUE VEL-LAT: 11 CM/S
MV E'TISSUE VEL-SEP: 9 CM/S
MV PEAK A VEL: 0.59 M/S
MV PEAK E VEL: 53 CM/S
MV STENOSIS PRESSURE HALF TIME: 77 MS
MV VALVE AREA P 1/2 METHOD: 2.86
NEUTROPHILS # BLD AUTO: 4.27 THOUSANDS/ÂΜL (ref 1.85–7.62)
NEUTS SEG NFR BLD AUTO: 64 % (ref 43–75)
NRBC BLD AUTO-RTO: 0 /100 WBCS
P AXIS: 66 DEGREES
PLATELET # BLD AUTO: 182 THOUSANDS/UL (ref 149–390)
PMV BLD AUTO: 9.6 FL (ref 8.9–12.7)
POTASSIUM SERPL-SCNC: 4.6 MMOL/L (ref 3.5–5.3)
POTASSIUM SERPL-SCNC: 4.6 MMOL/L (ref 3.5–5.3)
POTASSIUM SERPL-SCNC: 5 MMOL/L (ref 3.5–5.3)
POTASSIUM SERPL-SCNC: 5.4 MMOL/L (ref 3.5–5.3)
POTASSIUM SERPL-SCNC: 7 MMOL/L (ref 3.5–5.3)
PR INTERVAL: 188 MS
PROT SERPL-MCNC: 5.9 G/DL (ref 6.4–8.4)
QRS AXIS: 49 DEGREES
QRS AXIS: 54 DEGREES
QRSD INTERVAL: 88 MS
QRSD INTERVAL: 94 MS
QT INTERVAL: 402 MS
QT INTERVAL: 406 MS
QTC INTERVAL: 454 MS
QTC INTERVAL: 462 MS
RA PRESSURE ESTIMATED: 3 MMHG
RBC # BLD AUTO: 4.05 MILLION/UL (ref 3.88–5.62)
RIGHT ATRIAL 2D VOLUME: 41 ML
RIGHT ATRIUM AREA SYSTOLE A4C: 16 CM2
RIGHT VENTRICLE ID DIMENSION: 3.3 CM
SL CV AV DECELERATION TIME RETROGRADE: 3336 MS
SL CV AV PEAK GRADIENT RETROGRADE: 94 MMHG
SL CV ECHO LV DYNAMIC OBSTRUCTION PEAK GRADIENT (VALSAL: 36 MMHG
SL CV LEFT ATRIUM LENGTH A2C: 4.9 CM
SL CV LV EF: 60
SL CV PED ECHO LEFT VENTRICLE DIASTOLIC VOLUME (MOD BIPLANE) 2D: 64 ML
SL CV PED ECHO LEFT VENTRICLE SYSTOLIC VOLUME (MOD BIPLANE) 2D: 24 ML
SODIUM SERPL-SCNC: 137 MMOL/L (ref 135–147)
T WAVE AXIS: 21 DEGREES
T WAVE AXIS: 46 DEGREES
TIBC SERPL-MCNC: 170.8 UG/DL (ref 250–450)
TRANSFERRIN SERPL-MCNC: 122 MG/DL (ref 203–362)
TRICUSPID ANNULAR PLANE SYSTOLIC EXCURSION: 1.9 CM
UIBC SERPL-MCNC: 97 UG/DL (ref 155–355)
VENTRICULAR RATE: 77 BPM
VENTRICULAR RATE: 78 BPM
WBC # BLD AUTO: 6.55 THOUSAND/UL (ref 4.31–10.16)

## 2025-02-11 PROCEDURE — 93306 TTE W/DOPPLER COMPLETE: CPT | Performed by: STUDENT IN AN ORGANIZED HEALTH CARE EDUCATION/TRAINING PROGRAM

## 2025-02-11 PROCEDURE — 93306 TTE W/DOPPLER COMPLETE: CPT

## 2025-02-11 PROCEDURE — 70551 MRI BRAIN STEM W/O DYE: CPT

## 2025-02-11 PROCEDURE — 36415 COLL VENOUS BLD VENIPUNCTURE: CPT

## 2025-02-11 PROCEDURE — 83550 IRON BINDING TEST: CPT

## 2025-02-11 PROCEDURE — 84132 ASSAY OF SERUM POTASSIUM: CPT

## 2025-02-11 PROCEDURE — 82330 ASSAY OF CALCIUM: CPT

## 2025-02-11 PROCEDURE — 83735 ASSAY OF MAGNESIUM: CPT

## 2025-02-11 PROCEDURE — 99239 HOSP IP/OBS DSCHRG MGMT >30: CPT | Performed by: INTERNAL MEDICINE

## 2025-02-11 PROCEDURE — 97163 PT EVAL HIGH COMPLEX 45 MIN: CPT

## 2025-02-11 PROCEDURE — 97167 OT EVAL HIGH COMPLEX 60 MIN: CPT

## 2025-02-11 PROCEDURE — 84132 ASSAY OF SERUM POTASSIUM: CPT | Performed by: INTERNAL MEDICINE

## 2025-02-11 PROCEDURE — 80053 COMPREHEN METABOLIC PANEL: CPT | Performed by: INTERNAL MEDICINE

## 2025-02-11 PROCEDURE — 82948 REAGENT STRIP/BLOOD GLUCOSE: CPT

## 2025-02-11 PROCEDURE — 85025 COMPLETE CBC W/AUTO DIFF WBC: CPT | Performed by: INTERNAL MEDICINE

## 2025-02-11 PROCEDURE — 93010 ELECTROCARDIOGRAM REPORT: CPT | Performed by: INTERNAL MEDICINE

## 2025-02-11 PROCEDURE — 82728 ASSAY OF FERRITIN: CPT

## 2025-02-11 PROCEDURE — 92610 EVALUATE SWALLOWING FUNCTION: CPT

## 2025-02-11 PROCEDURE — 83540 ASSAY OF IRON: CPT

## 2025-02-11 RX ORDER — LABETALOL HYDROCHLORIDE 5 MG/ML
10 INJECTION, SOLUTION INTRAVENOUS EVERY 6 HOURS PRN
Status: DISCONTINUED | OUTPATIENT
Start: 2025-02-11 | End: 2025-02-11 | Stop reason: HOSPADM

## 2025-02-11 RX ORDER — ALBUMIN (HUMAN) 12.5 G/50ML
25 SOLUTION INTRAVENOUS ONCE
Status: COMPLETED | OUTPATIENT
Start: 2025-02-11 | End: 2025-02-11

## 2025-02-11 RX ORDER — LORAZEPAM 2 MG/ML
0.25 INJECTION INTRAMUSCULAR ONCE
Status: COMPLETED | OUTPATIENT
Start: 2025-02-11 | End: 2025-02-11

## 2025-02-11 RX ADMIN — ATORVASTATIN CALCIUM 20 MG: 20 TABLET, FILM COATED ORAL at 08:44

## 2025-02-11 RX ADMIN — HEPARIN SODIUM 7500 UNITS: 5000 INJECTION INTRAVENOUS; SUBCUTANEOUS at 13:16

## 2025-02-11 RX ADMIN — ASPIRIN 81 MG: 81 TABLET ORAL at 08:44

## 2025-02-11 RX ADMIN — Medication 2000 UNITS: at 08:44

## 2025-02-11 RX ADMIN — PANTOPRAZOLE SODIUM 40 MG: 40 TABLET, DELAYED RELEASE ORAL at 04:49

## 2025-02-11 RX ADMIN — HEPARIN SODIUM 7500 UNITS: 5000 INJECTION INTRAVENOUS; SUBCUTANEOUS at 04:48

## 2025-02-11 RX ADMIN — ALBUMIN (HUMAN) 25 G: 12.5 SOLUTION INTRAVENOUS at 09:53

## 2025-02-11 RX ADMIN — LORAZEPAM 0.25 MG: 2 INJECTION INTRAMUSCULAR; INTRAVENOUS at 13:27

## 2025-02-11 NOTE — PLAN OF CARE
Problem: NEUROSENSORY - ADULT  Goal: Achieves stable or improved neurological status  Description: INTERVENTIONS  - Monitor and report changes in neurological status  - Monitor vital signs such as temperature, blood pressure, glucose, and any other labs ordered   - Initiate measures to prevent increased intracranial pressure  - Monitor for seizure activity and implement precautions if appropriate      Outcome: Progressing  Goal: Achieves maximal functionality and self care  Description: INTERVENTIONS  - Monitor swallowing and airway patency with patient fatigue and changes in neurological status  - Encourage and assist patient to increase activity and self care.   - Encourage visually impaired, hearing impaired and aphasic patients to use assistive/communication devices  Outcome: Progressing     Problem: Neurological Deficit  Goal: Neurological status is stable or improving  Description: Interventions:  - Monitor and assess patient's level of consciousness, motor function, sensory function, and level of assistance needed for ADLs.   - Monitor and report changes from baseline. Collaborate with interdisciplinary team to initiate plan and implement interventions as ordered.   - Provide and maintain a safe environment.  - Consider seizure precautions.  - Consider fall precautions.  - Consider aspiration precautions.  - Consider bleeding precautions.  Outcome: Progressing     Problem: Activity Intolerance/Impaired Mobility  Goal: Mobility/activity is maintained at optimum level for patient  Description: Interventions:  - Assess and monitor patient  barriers to mobility and need for assistive/adaptive devices.  - Assess patient's emotional response to limitations.  - Collaborate with interdisciplinary team and initiate plans and interventions as ordered.  - Encourage independent activity per ability.  - Maintain proper body alignment.  - Perform active/passive rom as tolerated/ordered.  - Plan activities to conserve  energy.  - Turn patient as appropriate  Outcome: Progressing

## 2025-02-11 NOTE — ASSESSMENT & PLAN NOTE
Calcium 6.3, Ionized calcium 0.97   S/P replete calcium gluconate 2g  Ionized calcium 1.19 wnl  Resolved  Repeat BMP in a week

## 2025-02-11 NOTE — QUICK NOTE
MRI brain personally reviewed. No evidence of new acute infarct.   Evolution of L CR infarct from Oct 2024 noted, with near total resolution of diffusion restriction.     Lab Results   Component Value Date    CHOLESTEROL 129 02/10/2025    TRIG 89 02/10/2025    HDL 35 (L) 02/10/2025    LDLCALC 76 02/10/2025     Lab Results   Component Value Date    HGBA1C 6.7 (H) 02/10/2025     Lab Results   Component Value Date    [id number] 2.288 02/10/2025    FREET4 0.68 10/07/2024     A/P: Suspect recrudescence of prior stroke symptoms in setting of physiologic stress in setting of metabolic derangements (e.g. anemia, hypokalemia).  Discontinue stroke pathway  Continue home ASA 81mg daily and Atorvastatin 20mg daily.   Normotensive BP goal.  Next OP neurology appt scheduled for 6/2/25 with Harshad Romo PA-C. Can maintain this appt.  No further inpatient neurology recommendations at this time. Thank you for allowing us to be a part of his care. Please let us know if there are any acute questions or concerns.

## 2025-02-11 NOTE — DISCHARGE INSTR - AVS FIRST PAGE
Dear Tommie Taylor,     It was our pleasure to care for you here at [facility].  It is our hope that we were always able to exceed the expected standards for your care during your stay.  You were hospitalized due to slurred speech and strokelike symptoms.  You were cared for on the 3rd floor by Sally Rivers MD under the service of Samanta Virgen, * with the [facility] who covers for your primary care physician (PCP), Layton Flores MD, while you were hospitalized.  If you have any questions or concerns related to this hospitalization, you may contact us at .  For follow up as well as any medication refills, we recommend that you follow up with your primary care physician.  A registered nurse will reach out to you by phone within a few days after your discharge to answer any additional questions that you may have after going home.  However, at this time we provide for you here, the most important instructions / recommendations at discharge:     Notable Medication Adjustments -   Continue taking prior home medication as prescribed  Testing Required after Discharge -   Repeat BMP in 1 week  ** Please contact your PCP to request testing orders for any of the testing recommended here **  Important follow up information -   Please schedule an appointment and follow-up with your primary care provider within 1 week   please follow-up with your outpatient neurologist within 1 week of discharge.  Please change her positions from sleeping to standing slowly.  Please hydrate yourself to avoid hypotension (low blood pressure) induced dizziness/lightheaded.  Other Instructions -   If you have worsening slurred speech, generalized weakness, shortness of breath, abdominal pain, fever, chill, changes with urination/bowel movement or any symptoms concerning to you please return to the ED  Please review this entire after visit summary as  additional general instructions including medication list, appointments, activity, diet, any pertinent wound care, and other additional recommendations from your care team that may be provided for you.      Sincerely,     Sally Rivers MD

## 2025-02-11 NOTE — ASSESSMENT & PLAN NOTE
Hgb 7.4 (Base line 9-10), MCV low at 78, RDW 14 wnl, Platelet 123  No sign or complain of bleeding     Plan:  Hold off blood transfusion  Monitor hgb in AM lab

## 2025-02-11 NOTE — PROGRESS NOTES
Progress Note - Hospitalist   Name: Tommie Taylor 59 y.o. male I MRN: [id number]  Unit/Bed#: S -01 I Date of Admission: 2/10/2025   Date of Service: 2/11/2025 I Hospital Day: 1    Assessment & Plan  Stroke-like symptoms  PMH of stroke in October 2024 with residual Right upper extremities weakness   Sudden onset generalized weakness, dizziness and feeling of syncope.  No loss of consciousness.  Endorses dysarthria and right facial asymmetry  CT brain - No acute abnormality. Chronic infarct in left corona radiata and left striatocapsular regions with mild chronic microangiopathy  CTA head/neck - Negative for large vessel occlusion, dissection or aneurysm. Severe focal stenossi in bilateral PCA likely due to atherosclerotic disease.  ECHO with bubble study - LVEF 60% G1DD. No PFO detected. Aortic valve sclerosis w/o stenosis.  HgbA1C at 6.7  Lipid panel - cholesterol 129/ triglycerides 89/ HDL 35 low/ LDL 76 wnl  TSH 2.288 wnl  Speech therapy - regular diet and thin liquid   Physical therapy - Discharge home  Physical exam - dysarthria improving. Cranial nerves intact, sensation intact all extremities. Left upper and lower extremity strength 5/5.  Right upper extremity strength 3/5, right lower extremity strength is 4/5.  Per patient this is baseline.    Plan:  Status post loading aspirin 324 Mg once  Continue aspirin 81 mg daily  Continue Lipitor 20 Mg daily  MRI brain scheduled for this evening - based on MRI results consider starting Plavix 300 Mg loading dose  Gradual return to normal blood pressure  Hold atenolol, hydralazine, nifedipine for now  Neuro check  Monitor on telemetry  Follow-up OT eval and treat    Hypokalemia  POA potassium at 2.5  S/P potassium repletion 80 mg  Overnight K at 7.0, but EKG NSR, repeat K at 4.6 within normal limit  Resolved    Plan:  Follow up AM BMP  Hypocalcemia  Calcium 6.3, Ionized calcium 0.97   S/P replete calcium gluconate 2g  Ionized calcium 1.19 wnl    Plan:  Follow  up AM BMP  Anemia, unspecified  Hgb 7.4 (Base line 9-10), MCV low at 78, RDW 14 wnl, Platelet 123  No sign or complain of bleeding   Hgb at 10.5    Plan:  Hold off blood transfusion  Monitor hgb in AM lab  Follow up iron panel  CVA (cerebral vascular accident) (HCC)  Left corona radiata stroke (Oct 2024) with residual right upper extremity and lower extremity weakness.  Home medication-aspirin 81 Mg and Lipitor 20 Mg daily    Plan:  Continue prior home medication  Type 2 diabetes mellitus (HCC)  Lab Results   Component Value Date    HGBA1C 6.7 (H) 02/10/2025       Recent Labs     02/10/25  1528 02/10/25  2213 02/11/25  0816 02/11/25  1117   POCGLU 150* 159* 80 137       Blood Sugar Average: Last 72 hrs:  (P) 131.5  Home - NPH 20 units before breakfast and 10 units in the evening     Plan:  Lighting scale algorithm 4    VTE Pharmacologic Prophylaxis: VTE Score: 3 Moderate Risk (Score 3-4) - Pharmacological DVT Prophylaxis Ordered: heparin.    Mobility:   Basic Mobility Inpatient Raw Score: 20  JH-HLM Goal: 6: Walk 10 steps or more  JH-HLM Achieved: 8: Walk 250 feet ot more  JH-HLM Goal NOT achieved. Continue with multidisciplinary rounding and encourage appropriate mobility to improve upon JH-HLM goals.    Patient Centered Rounds: I performed bedside rounds with nursing staff today.   Discussions with Specialists or Other Care Team Provider: Neurology     Education and Discussions with Family / Patient: Updated  (wife) via phone.    Current Length of Stay: 1 day(s)  Current Patient Status: Inpatient   Certification Statement: The patient will continue to require additional inpatient hospital stay due to Stroke work up, pending MRI brain  Discharge Plan: Anticipate discharge tomorrow to home.    Code Status: Level 1 - Full Code    Subjective   Patient is seen and examined by me at bedside.  Patient has no acute complaint no significant event overnight.  Patient reports that he he feels like his strength  has come back to his baseline.  Patient's dysarthria also resolved.  Patient is getting echocardiogram this morning and planning to have MRI in the evening.  Patient is denying chest pain, shortness of breath, abdominal pain, fever, chill, changes with urination or bowel movement.    Objective :  Temp:  [97.7 °F (36.5 °C)-98.1 °F (36.7 °C)] 98.1 °F (36.7 °C)  HR:  [61-85] 82  BP: (128-175)/() 165/95  Resp:  [16-20] 18  SpO2:  [96 %-100 %] 100 %  O2 Device: None (Room air)    Body mass index is 41.63 kg/m².     Input and Output Summary (last 24 hours):     Intake/Output Summary (Last 24 hours) at 2/11/2025 1400  Last data filed at 2/11/2025 1301  Gross per 24 hour   Intake 890 ml   Output 1250 ml   Net -360 ml       Physical Exam  Vitals reviewed.   Constitutional:       Appearance: Normal appearance.   Eyes:      General: No scleral icterus.        Right eye: No discharge.         Left eye: No discharge.   Cardiovascular:      Rate and Rhythm: Normal rate and regular rhythm.      Pulses: Normal pulses.      Heart sounds: Normal heart sounds. No murmur heard.     No friction rub.   Pulmonary:      Effort: Pulmonary effort is normal. No respiratory distress.      Breath sounds: Normal breath sounds. No stridor. No wheezing or rales.   Chest:      Chest wall: No tenderness.   Abdominal:      General: Bowel sounds are normal. There is no distension.      Palpations: Abdomen is soft.      Tenderness: There is no abdominal tenderness. There is no guarding.   Musculoskeletal:         General: Normal range of motion.      Cervical back: Normal range of motion.      Right lower leg: No edema.      Left lower leg: No edema.   Skin:     General: Skin is warm.      Capillary Refill: Capillary refill takes less than 2 seconds.      Coloration: Skin is not jaundiced or pale.   Neurological:      General: No focal deficit present.      Mental Status: He is alert and oriented to person, place, and time. Mental status is at  baseline.      Cranial Nerves: No cranial nerve deficit.      Sensory: No sensory deficit.      Motor: Weakness (Right upper extermity 3/5, Right lower extermity 4/5) present.   Psychiatric:         Mood and Affect: Mood normal.         Behavior: Behavior normal.         Thought Content: Thought content normal.         Judgment: Judgment normal.           Lines/Drains:        Telemetry:  Telemetry Orders (From admission, onward)               24 Hour Telemetry Monitoring  Continuous x 24 Hours (Telem)        Expiring   Question:  Reason for 24 Hour Telemetry  Answer:  Metabolic/electrolyte disturbance with high probability of dysrhythmia. K level <3 or >6 OR KCL infusion >10mEq/hr                     Telemetry Reviewed: Normal Sinus Rhythm  Indication for Continued Telemetry Use: Metabolic/electrolyte disturbance with high probability of dysrhythmia               Lab Results: I have reviewed the following results:   Results from last 7 days   Lab Units 02/11/25  0445   WBC Thousand/uL 6.55   HEMOGLOBIN g/dL 10.5*   HEMATOCRIT % 32.0*   PLATELETS Thousands/uL 182   SEGS PCT % 64   LYMPHO PCT % 25   MONO PCT % 8   EOS PCT % 2     Results from last 7 days   Lab Units 02/11/25  0445   SODIUM mmol/L 137   POTASSIUM mmol/L 4.6  4.6   CHLORIDE mmol/L 110*   CO2 mmol/L 21   BUN mg/dL 20   CREATININE mg/dL 3.04*   ANION GAP mmol/L 6   CALCIUM mg/dL 9.2   ALBUMIN g/dL 3.1*   TOTAL BILIRUBIN mg/dL 0.33   ALK PHOS U/L 66   ALT U/L 7   AST U/L 11*   GLUCOSE RANDOM mg/dL 84     Results from last 7 days   Lab Units 02/10/25  1515   INR  1.31*     Results from last 7 days   Lab Units 02/11/25  1117 02/11/25  0816 02/10/25  2213 02/10/25  1528   POC GLUCOSE mg/dl 137 80 159* 150*     Results from last 7 days   Lab Units 02/10/25  1515 02/05/25  1147   HEMOGLOBIN A1C % 6.7* 6.5           Recent Cultures (last 7 days):         Imaging Results Review: I reviewed radiology reports from this admission including: Echocardiogram.  Other  Study Results Review: No additional pertinent studies reviewed.    Last 24 Hours Medication List:     Current Facility-Administered Medications:     acetaminophen (TYLENOL) tablet 650 mg, Q6H PRN    aspirin (ECOTRIN LOW STRENGTH) EC tablet 81 mg, Daily    [Held by provider] atenolol (TENORMIN) tablet 25 mg, Daily    atorvastatin (LIPITOR) tablet 20 mg, Daily    Cholecalciferol (VITAMIN D3) tablet 2,000 Units, Daily    [START ON 2/12/2025] ergocalciferol (VITAMIN D2) capsule 50,000 Units, Once per day on Monday Wednesday Friday    heparin (porcine) subcutaneous injection 7,500 Units, Q8H MIGEL    [Held by provider] hydrALAZINE (APRESOLINE) tablet 10 mg, TID    insulin lispro (HumALOG/ADMELOG) 100 units/mL subcutaneous injection 2-12 Units, TID AC **AND** Fingerstick Glucose (POCT), TID AC    insulin lispro (HumALOG/ADMELOG) 100 units/mL subcutaneous injection 2-12 Units, HS    labetalol (NORMODYNE) injection 10 mg, Q6H PRN    nicotine (NICODERM CQ) 7 mg/24hr TD 24 hr patch 1 patch, Daily    [Held by provider] NIFEdipine (PROCARDIA XL) 24 hr tablet 90 mg, Daily    pantoprazole (PROTONIX) EC tablet 40 mg, Early Morning    Administrative Statements   Today, Patient Was Seen By: Sally Rivers MD      **Please Note: This note may have been constructed using a voice recognition system.**

## 2025-02-11 NOTE — PROGRESS NOTES
Outpatient Care Management Note:    Re:  ADT alert/chart review    Received ADT alert and chart reviewed. Patient at [facility] from 2/10/25-present for stroke-like symptoms. Patient was found to be hypolakemic at 2.5 and hypocalcium at 6.3. Presented with slurred speech and stroke alert was called. Patient has history for left corona radiata infarct in October 2024 with residual right sided weakness.     Will continue to follow and outreach when discharged.

## 2025-02-11 NOTE — PHYSICAL THERAPY NOTE
PHYSICAL THERAPY EVALUATION  DATE: 02/11/25  TIME: 2377-7895    NAME:  Tommie Taylor  AGE:   59 y.o.  Mrn:   [id number]  Length Of Stay: 1    ADMIT DX:  Hypokalemia [E87.6]  Stroke-like symptoms [R29.90]  Symptomatic anemia [D64.9]    Past Medical History:   Diagnosis Date    ADHD, adult residual type     Anxiety     Anxiety     Bipolar 1 disorder (HCC)     Cataract     Left eye    Chronic kidney disease     Colon polyp     CPAP (continuous positive airway pressure) dependence     Depression     Depression     Diabetes mellitus (HCC)     blood sugar 167 on admission    Equinus deformity of foot     GERD (gastroesophageal reflux disease)     Homicidal ideations     Hyperlipidemia     Hypertension     Microalbuminuria     Morbid obesity (HCC)     Neuropathy     Shortness of breath     Sleep apnea     CPAP at bedtime    Sleep apnea     Stroke (HCC)     Suicidal intent      Past Surgical History:   Procedure Laterality Date    CATARACT EXTRACTION      CATARACT EXTRACTION      COLONOSCOPY      HAND SURGERY Right     OTHER SURGICAL HISTORY      REPAIR OF SUPERFICIAL WOUND FACE    [place] ESOPHAGOGASTRODUODENOSCOPY TRANSORAL DIAGNOSTIC N/A 06/14/2018    Procedure: ESOPHAGOGASTRODUODENOSCOPY (EGD);  Surgeon: Yinka Maier MD;  Location: [facility];  Service: Gastroenterology    [place] ESOPHAGOGASTRODUODENOSCOPY TRANSORAL DIAGNOSTIC N/A 09/12/2018    Procedure: EGD AND COLONOSCOPY;  Surgeon: Yinka Maier MD;  Location: [facility];  Service: Gastroenterology       Performed at least 2 patient identifiers during session: Name, Birthday, ID bracelet, and Epic photo     02/11/25 1130   PT Last Visit   PT Visit Date 02/11/25   Note Type   Note type Evaluation   Pain Assessment   Pain Assessment Tool 0-10   Pain Score No Pain   Restrictions/Precautions   Weight Bearing Precautions Per Order No   Other Precautions Cognitive;Chair Alarm;Bed Alarm;Multiple lines;Fall Risk  (R hemiparesis; IV; danie)   Home Living   Type of Home House    Home Layout Two level;Stairs to enter with rails;Performs ADLs on one level;Able to live on main level with bedroom/bathroom;Ramped entrance  (1+1 ASHOK front door (vs ramp to enter side entrance however pt reports using steps); has been maintaining FFSU with bed in living room and full bathroom since d/c from [facility].)   Bathroom Shower/Tub Walk-in shower   Bathroom Toilet Raised  (pt reports not using the toilet in bathroom, strictly uses BSC)   Bathroom Equipment Grab bars in shower;Shower chair;Grab bars around toilet;Hand-held shower;Commode  (BSC in living room)   Bathroom Accessibility Accessible   Home Equipment Cane;Walker;Electric scooter   Prior Function   Level of Roy Independent with ADLs;Independent with functional mobility;Needs assistance with IADLS   Lives With Spouse;Family  (significant other and her father)   Receives Help From Family;Outpatient therapy  (+ OPPT and OPOT)   IADLs Family/Friend/Other provides transportation;Independent with meal prep;Family/Friend/Other provides medication management  (is able to complete light meal prep; significant other provides transportation and medication management)   Falls in the last 6 months 1 to 4   Vocational On disability  (previously worked in [employer])   Comments Pt reports that he ambulates with no AD in the home, uses SPC in community, holds carts for support in stores.   General   Additional Pertinent History Pt is a 59 yr old male admitted 2/10/25 with stroke like symptoms: sudden onset nausea, dizziness, facial droop, slurred speech. CT head: no acute abnormality, chronic infarcts of L corona radiata & L striatocapsular regions. CTA head/neck: severe stenosis of B PCA. MRI pending. Of note, 10/2024 pt with similar presentation, +tNK, residual R sided weakness, d/c to [facility] for rehab, now coming from home with OPPT. PMH includes ADHD, anxiety, bipolar disorder, cataracts, CKD, depression, DM, HTN, obesity, neuropathy, CVA, tardive dyskinesia.  "  Family/Caregiver Present No   Cognition   Overall Cognitive Status Impaired   Arousal/Participation Cooperative   Orientation Level Oriented X4  (grossly oriented to time (+month/year))   Memory Within functional limits   Following Commands Follows multistep commands with increased time or repetition   Subjective   Subjective \"If you give me a cane i'll walk whereever you want to!\"   RUE Assessment   RUE Assessment X   RUE Strength   RUE Overall Strength Deficits  (baseline deficits from previous CVA - severely limited shoulder abduction / flexion, elbow extension, non functional grasp; held in flexion synergy at rest)   LUE Assessment   LUE Assessment WFL   RLE Assessment   RLE Assessment WFL  (4-/5 to 4/5 MMT throughout)   LLE Assessment   LLE Assessment WFL  (4/5 to 4+/5 MMT throughout)   Vision-Basic Assessment   Current Vision Wears glasses only for reading   Patient Visual Report Other (Comment)  (slight blurring of distance still)   Coordination   Movements are Fluid and Coordinated 1   Sensation X  (baseline neuropathy in B LEs)   Bed Mobility   Supine to Sit 6  Modified independent   Additional items HOB elevated;Bedrails   Sit to Supine   (NT as pt was left seated OOB in recliner chair with alarm engaged at end of session)   Additional Comments Pt denies lightheadedness or dizziness with changes in positioning. Pt with overall good sitting balance at EOB.   Transfers   Sit to Stand 6  Modified independent   Additional items Increased time required  (L sided SPC)   Stand to Sit 6  Modified independent   Additional items Armrests;Increased time required  (L sided SPC)   Stand pivot 5  Supervision   Additional items Assist x 1;Increased time required;Verbal cues  (L sided SPC)   Ambulation/Elevation   Gait pattern Decreased foot clearance;Short stride;Step through pattern   Gait Assistance 5  Supervision   Additional items Assist x 1;Verbal cues   Assistive Device Straight cane  (L sided)   Distance 280ft " x1 with change in direction assisted   Stair Management Assistance 5  Supervision   Additional items Assist x 1;Verbal cues   Stair Management Technique With cane  (ascending fwd, descending backwards)   Number of Stairs 2  (1 step x2 reps to simulate pt's 1+1 ASHOK his home)   Balance   Static Sitting Good   Dynamic Sitting Good   Static Standing Fair +  (w/ SPC)   Dynamic Standing Fair  (w/ SPC)   Ambulatory Fair  (w/ SPC)   Endurance Deficit   Endurance Deficit No   Activity Tolerance   Activity Tolerance Patient tolerated treatment well   Medical Staff Made Aware Spoke with FREDI Bass RN Caroline   Assessment   Prognosis Good   Problem List Decreased strength;Decreased range of motion;Decreased mobility;Impaired sensation;Obesity;Decreased cognition   Assessment Pt seen for PT evaluation for mobility assessment & discharge needs. Pt admitted 2/10/2025 w/ nausea, vomiting, dizziness, facial droop, slurred speech, dx Stroke-like symptoms. PMH significant for previous CVA, ADHD, bipolar disorder, tardive dyskinesia, neuropathy. During PT IE, pt completes bed mobility at EWA level, transfers STS with SPC at EWA level, ambulates 280ft with SPC and S, and negotiates 2 stairs with SPC and S to simulate access to his home. Pt displays above outlined functional impairments & limitations, and presents close to his baseline level of functional mobility. The AM-PAC & Barthel Index outcome tools were used to assist in determining pt safety w/ mobility/self care & appropriate d/c recommendations, see above for scores. Pt is at risk of falls d/t multiple comorbidities, impulsivity, h/o falls, impaired balance, impaired cognition, use of ambulatory aid, ongoing medical treatment of primary dx, and polypharmacy. Pt's clinical presentation is currently unstable/unpredictable as seen in pt's presentation of vital sign response and varying levels of cognitive performance. Based on pt presentation & impairments, pt appears  "to be close to his baseline level of functional mobility and would most appropriately benefit from Level III (minimal PT intensity) resources (continued OPPT follow up) upon d/c. No additional skilled PT services indicated in the acute care setting; recommend frequent bouts of supervised ambulation with RN staffing during remainder of his stay in order to combat hospital related deconditioning.   Barriers to Discharge None   Goals   Patient Goals \"to go home\"   PT Treatment Day 0   Discharge Recommendation   Rehab Resource Intensity Level, PT III (Minimum Resource Intensity)  (continued f/u with OPPT)   AM-PAC Basic Mobility Inpatient   Turning in Flat Bed Without Bedrails 4   Lying on Back to Sitting on Edge of Flat Bed Without Bedrails 3   Moving Bed to Chair 3   Standing Up From Chair Using Arms 4   Walk in Room 3   Climb 3-5 Stairs With Railing 3   Basic Mobility Inpatient Raw Score 20   Basic Mobility Standardized Score 43.99   [facility] Highest Level Of Mobility   -HLM Goal 6: Walk 10 steps or more   JH-HLM Achieved 8: Walk 250 feet ot more   Modified [place] Scale   Modified Jerry Scale 3   Barthel Index   Feeding 5   Bathing 0   Grooming Score 0   Dressing Score 5   Bladder Score 10   Bowels Score 10   Toilet Use Score 10   Transfers (Bed/Chair) Score 15   Mobility (Level Surface) Score 10   Stairs Score 5   Barthel Index Score 70   End of Consult   Patient Position at End of Consult Bedside chair;Bed/Chair alarm activated;All needs within reach     Based on patient's [facility] Highest Level of Mobility scores today, patient currently has a goal of -HLM Levels: 8: WALK 250 FEET OR MORE, to be completed with RN staffing each shift, in order to improve overall activity tolerance and mobility, combat hospital related deconditioning, and maximize outcomes for d/c from the acute care setting.     The patient's AM-PAC Basic Mobility Inpatient Short Form Raw Score is 20. A Raw score of greater than 16 " suggests the patient may benefit from discharge to home. Please also refer to the recommendation of the Physical Therapist for safe discharge planning.      Jania Lloyd PT, DPT   Available via [employer]  NPI # [id number]  [place] License - [id number]  2/11/2025

## 2025-02-11 NOTE — ASSESSMENT & PLAN NOTE
POA potassium at 2.5  S/P potassium repletion 80 mg  Overnight K at 7.0, but EKG NSR, repeat K at 4.6 within normal limit  Resolved    Plan:  Follow up AM BMP

## 2025-02-11 NOTE — ASSESSMENT & PLAN NOTE
Calcium 6.3, Ionized calcium 0.97   S/P replete calcium gluconate 2g  Ionized calcium 1.19 wnl    Plan:  Follow up [facility]

## 2025-02-11 NOTE — ASSESSMENT & PLAN NOTE
Lab Results   Component Value Date    HGBA1C 6.7 (H) 02/10/2025       Recent Labs     02/10/25  1528 02/10/25  2213 02/11/25  0816 02/11/25  1117   POCGLU 150* 159* 80 137       Blood Sugar Average: Last 72 hrs:  (P) 131.5  Home - NPH 20 units before breakfast and 10 units in the evening     Plan:  Continue prior home medication

## 2025-02-11 NOTE — ASSESSMENT & PLAN NOTE
Hgb 7.4 (Base line 9-10), MCV low at 78, RDW 14 wnl, Platelet 123  No sign or complain of bleeding   Hgb at 10.5    Plan:  Hold off blood transfusion  Monitor hgb in AM lab  Follow up iron panel

## 2025-02-11 NOTE — ASSESSMENT & PLAN NOTE
Left corona radiata stroke (Oct 2024) with residual right upper extremity and lower extremity weakness.  Home medication-aspirin 81 Mg and Lipitor 20 Mg daily    Plan:  Continue prior home medication

## 2025-02-11 NOTE — ASSESSMENT & PLAN NOTE
POA potassium at 2.5  S/P potassium repletion 80 mg    Plan:  Recheck potassium at 2AM  Replete as needed

## 2025-02-11 NOTE — PROGRESS NOTES
Progress Note - Hospitalist   Name: Tommie Taylor 59 y.o. male I MRN: [id number]  Unit/Bed#: S -01 I Date of Admission: 2/10/2025   Date of Service: 2/11/2025 I Hospital Day: 1    Assessment & Plan  Stroke-like symptoms  PMH of stroke in October 2024 with residual Right upper extremities weakness   Sudden onset generalized weakness, dizziness and feeling of syncope.  No loss of consciousness.  Endorses dysarthria and right facial asymmetry  CT brain - No acute abnormality. Chronic infarct in left corona radiata and left striatocapsular regions with mild chronic microangiopathy  CTA head/neck - Negative for large vessel occlusion, dissection or aneurysm. Severe focal stenossi in bilateral PCA likely due to atherosclerotic disease.  ECHO with bubble study - LVEF 60% G1DD. No PFO detected. Aortic valve sclerosis w/o stenosis.  HgbA1C at 6.7  Lipid panel - cholesterol 129/ triglycerides 89/ HDL 35 low/ LDL 76 wnl  TSH 2.288 wnl  Speech therapy - regular diet and thin liquid   Physical therapy - Discharge home  Physical exam - dysarthria improving. Cranial nerves intact, sensation intact all extremities. Left upper and lower extremity strength 5/5.  Right upper extremity strength 3/5, right lower extremity strength is 4/5.  Per patient this is baseline.    Plan:  Status post loading aspirin 324 Mg once  Continue aspirin 81 mg daily  Continue Lipitor 20 Mg daily  MRI brain scheduled for this evening - based on MRI results consider starting Plavix 300 Mg loading dose  Gradual return to normal blood pressure  Hold atenolol, hydralazine, nifedipine for now  Follow up orthostatic blood pressure   Neuro check  Monitor on telemetry  Follow-up OT eval and treat    Hypokalemia  POA potassium at 2.5  S/P potassium repletion 80 mg  Overnight K at 7.0, but EKG NSR, repeat K at 4.6 within normal limit  Resolved    Plan:  Follow up AM BMP  Hypocalcemia  Calcium 6.3, Ionized calcium 0.97   S/P replete calcium gluconate  2g  Ionized calcium 1.19 wnl    Plan:  Follow up AM BMP  Anemia, unspecified  Hgb 7.4 (Base line 9-10), MCV low at 78, RDW 14 wnl, Platelet 123  No sign or complain of bleeding   Hgb at 10.5    Plan:  Hold off blood transfusion  Monitor hgb in AM lab  Follow up iron panel  CVA (cerebral vascular accident) ([facility])  Left corona radiata stroke (Oct 2024) with residual right upper extremity and lower extremity weakness.  Home medication-aspirin 81 Mg and Lipitor 20 Mg daily    Plan:  Continue prior home medication  Type 2 diabetes mellitus (HCC)  Lab Results   Component Value Date    HGBA1C 6.7 (H) 02/10/2025       Recent Labs     02/10/25  1528 02/10/25  2213 02/11/25  0816 02/11/25  1117   POCGLU 150* 159* 80 137       Blood Sugar Average: Last 72 hrs:  (P) 131.5  Home - NPH 20 units before breakfast and 10 units in the evening     Plan:  Lighting scale algorithm 4    VTE Pharmacologic Prophylaxis: VTE Score: 3 Moderate Risk (Score 3-4) - Pharmacological DVT Prophylaxis Ordered: heparin.    Mobility:   Basic Mobility Inpatient Raw Score: 20  JH-HLM Goal: 6: Walk 10 steps or more  JH-HLM Achieved: 8: Walk 250 feet ot more  JH-HLM Goal NOT achieved. Continue with multidisciplinary rounding and encourage appropriate mobility to improve upon JH-HLM goals.    Patient Centered Rounds: I performed bedside rounds with nursing staff today.   Discussions with Specialists or Other Care Team Provider: Neurology     Education and Discussions with Family / Patient: Updated  (wife) via phone.    Current Length of Stay: 1 day(s)  Current Patient Status: Inpatient   Certification Statement: The patient will continue to require additional inpatient hospital stay due to Stroke work up, pending MRI brain  Discharge Plan: Anticipate discharge tomorrow to home.    Code Status: Level 1 - Full Code    Subjective   Patient is seen and examined by me at bedside.  Patient has no acute complaint no significant event overnight.   Patient reports that he he feels like his strength has come back to his baseline.  Patient's dysarthria also resolved.  Patient is getting echocardiogram this morning and planning to have MRI in the evening.  Patient is denying chest pain, shortness of breath, abdominal pain, fever, chill, changes with urination or bowel movement.    Objective :  Temp:  [97.7 °F (36.5 °C)-98.1 °F (36.7 °C)] 98.1 °F (36.7 °C)  HR:  [61-85] 82  BP: (128-175)/() 165/95  Resp:  [16-20] 18  SpO2:  [96 %-100 %] 100 %  O2 Device: None (Room air)    Body mass index is 41.63 kg/m².     Input and Output Summary (last 24 hours):     Intake/Output Summary (Last 24 hours) at 2/11/2025 1421  Last data filed at 2/11/2025 1301  Gross per 24 hour   Intake 890 ml   Output 1250 ml   Net -360 ml       Physical Exam  Vitals reviewed.   Constitutional:       Appearance: Normal appearance.   Eyes:      General: No scleral icterus.        Right eye: No discharge.         Left eye: No discharge.   Cardiovascular:      Rate and Rhythm: Normal rate and regular rhythm.      Pulses: Normal pulses.      Heart sounds: Normal heart sounds. No murmur heard.     No friction rub.   Pulmonary:      Effort: Pulmonary effort is normal. No respiratory distress.      Breath sounds: Normal breath sounds. No stridor. No wheezing or rales.   Chest:      Chest wall: No tenderness.   Abdominal:      General: Bowel sounds are normal. There is no distension.      Palpations: Abdomen is soft.      Tenderness: There is no abdominal tenderness. There is no guarding.   Musculoskeletal:         General: Normal range of motion.      Cervical back: Normal range of motion.      Right lower leg: No edema.      Left lower leg: No edema.   Skin:     General: Skin is warm.      Capillary Refill: Capillary refill takes less than 2 seconds.      Coloration: Skin is not jaundiced or pale.   Neurological:      General: No focal deficit present.      Mental Status: He is alert and oriented  to person, place, and time. Mental status is at baseline.      Cranial Nerves: No cranial nerve deficit.      Sensory: No sensory deficit.      Motor: Weakness (Right upper extermity 3/5, Right lower extermity 4/5) present.   Psychiatric:         Mood and Affect: Mood normal.         Behavior: Behavior normal.         Thought Content: Thought content normal.         Judgment: Judgment normal.           Lines/Drains:        Telemetry:  Telemetry Orders (From admission, onward)               24 Hour Telemetry Monitoring  Continuous x 24 Hours (Telem)        Expiring   Question:  Reason for 24 Hour Telemetry  Answer:  Metabolic/electrolyte disturbance with high probability of dysrhythmia. K level <3 or >6 OR KCL infusion >10mEq/hr                     Telemetry Reviewed: Normal Sinus Rhythm  Indication for Continued Telemetry Use: Metabolic/electrolyte disturbance with high probability of dysrhythmia               Lab Results: I have reviewed the following results:   Results from last 7 days   Lab Units 02/11/25  0445   WBC Thousand/uL 6.55   HEMOGLOBIN g/dL 10.5*   HEMATOCRIT % 32.0*   PLATELETS Thousands/uL 182   SEGS PCT % 64   LYMPHO PCT % 25   MONO PCT % 8   EOS PCT % 2     Results from last 7 days   Lab Units 02/11/25  0445   SODIUM mmol/L 137   POTASSIUM mmol/L 4.6  4.6   CHLORIDE mmol/L 110*   CO2 mmol/L 21   BUN mg/dL 20   CREATININE mg/dL 3.04*   ANION GAP mmol/L 6   CALCIUM mg/dL 9.2   ALBUMIN g/dL 3.1*   TOTAL BILIRUBIN mg/dL 0.33   ALK PHOS U/L 66   ALT U/L 7   AST U/L 11*   GLUCOSE RANDOM mg/dL 84     Results from last 7 days   Lab Units 02/10/25  1515   INR  1.31*     Results from last 7 days   Lab Units 02/11/25  1117 02/11/25  0816 02/10/25  2213 02/10/25  1528   POC GLUCOSE mg/dl 137 80 159* 150*     Results from last 7 days   Lab Units 02/10/25  1515 02/05/25  1147   HEMOGLOBIN A1C % 6.7* 6.5           Recent Cultures (last 7 days):         Imaging Results Review: I reviewed radiology reports from  this admission including: Echocardiogram.  Other Study Results Review: No additional pertinent studies reviewed.    Last 24 Hours Medication List:     Current Facility-Administered Medications:     acetaminophen (TYLENOL) tablet 650 mg, Q6H PRN    aspirin (ECOTRIN LOW STRENGTH) EC tablet 81 mg, Daily    [Held by provider] atenolol (TENORMIN) tablet 25 mg, Daily    atorvastatin (LIPITOR) tablet 20 mg, Daily    Cholecalciferol (VITAMIN D3) tablet 2,000 Units, Daily    [START ON 2/12/2025] ergocalciferol (VITAMIN D2) capsule 50,000 Units, Once per day on Monday Wednesday Friday    heparin (porcine) subcutaneous injection 7,500 Units, Q8H MIGEL    [Held by provider] hydrALAZINE (APRESOLINE) tablet 10 mg, TID    insulin lispro (HumALOG/ADMELOG) 100 units/mL subcutaneous injection 2-12 Units, TID AC **AND** Fingerstick Glucose (POCT), TID AC    insulin lispro (HumALOG/ADMELOG) 100 units/mL subcutaneous injection 2-12 Units, HS    labetalol (NORMODYNE) injection 10 mg, Q6H PRN    nicotine (NICODERM CQ) 7 mg/24hr TD 24 hr patch 1 patch, Daily    [Held by provider] NIFEdipine (PROCARDIA XL) 24 hr tablet 90 mg, Daily    pantoprazole (PROTONIX) EC tablet 40 mg, Early Morning    Administrative Statements   Today, Patient Was Seen By: Sally Rivers MD      **Please Note: This note may have been constructed using a voice recognition system.**

## 2025-02-11 NOTE — ASSESSMENT & PLAN NOTE
Hgb 7.4 (Base line 9-10), MCV low at 78, RDW 14 wnl, Platelet 123  No sign or complain of bleeding   Hgb at 10.5

## 2025-02-11 NOTE — ASSESSMENT & PLAN NOTE
PMH of stroke in October 2024 with residual Right upper extremities weakness   Sudden onset generalized weakness, dizziness and feeling of syncope.  No loss of consciousness.  Endorses dysarthria and right facial asymmetry  CT brain - No acute abnormality. Chronic infarct in left corona radiata and left striatocapsular regions with mild chronic microangiopathy  CTA head/neck - Negative for large vessel occlusion, dissection or aneurysm. Severe focal stenossi in bilateral PCA likely due to atherosclerotic disease.  ECHO with bubble study - LVEF 60% G1DD. No PFO detected. Aortic valve sclerosis w/o stenosis.  HgbA1C at 6.7  Lipid panel - cholesterol 129/ triglycerides 89/ HDL 35 low/ LDL 76 wnl  TSH 2.288 wnl  Speech therapy - regular diet and thin liquid   Physical therapy - Discharge home  Physical exam - dysarthria improving. Cranial nerves intact, sensation intact all extremities. Left upper and lower extremity strength 5/5.  Right upper extremity strength 3/5, right lower extremity strength is 4/5.  Per patient this is baseline.    Plan:  Status post loading aspirin 324 Mg once  Continue aspirin 81 mg daily  Continue Lipitor 20 Mg daily  MRI brain scheduled for this evening - based on MRI results consider starting Plavix 300 Mg loading dose  Gradual return to normal blood pressure  Hold atenolol, hydralazine, nifedipine for now  Neuro check  Monitor on telemetry  Follow-up OT eval and treat

## 2025-02-11 NOTE — OCCUPATIONAL THERAPY NOTE
"    Occupational Therapy Evaluation     Patient Name: Tommie Taylor  Today's Date: 2/11/2025  Problem List  Principal Problem:    Stroke-like symptoms  Active Problems:    Anemia, unspecified    Type 2 diabetes mellitus (HCC)    CVA (cerebral vascular accident) (HCC)    Hypokalemia    Hypocalcemia    Past Medical History  Past Medical History:   Diagnosis Date    ADHD, adult residual type     Anxiety     Anxiety     Bipolar 1 disorder (HCC)     Cataract     Left eye    Chronic kidney disease     Colon polyp     CPAP (continuous positive airway pressure) dependence     Depression     Depression     Diabetes mellitus (HCC)     blood sugar 167 on admission    Equinus deformity of foot     GERD (gastroesophageal reflux disease)     Homicidal ideations     Hyperlipidemia     Hypertension     Microalbuminuria     Morbid obesity (HCC)     Neuropathy     Shortness of breath     Sleep apnea     CPAP at bedtime    Sleep apnea     Stroke (HCC)     Suicidal intent      Past Surgical History  Past Surgical History:   Procedure Laterality Date    CATARACT EXTRACTION      CATARACT EXTRACTION      COLONOSCOPY      HAND SURGERY Right     OTHER SURGICAL HISTORY      REPAIR OF SUPERFICIAL WOUND FACE    [place] ESOPHAGOGASTRODUODENOSCOPY TRANSORAL DIAGNOSTIC N/A 06/14/2018    Procedure: ESOPHAGOGASTRODUODENOSCOPY (EGD);  Surgeon: Yinka Maier MD;  Location: [facility];  Service: Gastroenterology    [place] ESOPHAGOGASTRODUODENOSCOPY TRANSORAL DIAGNOSTIC N/A 09/12/2018    Procedure: EGD AND COLONOSCOPY;  Surgeon: Yinka Maier MD;  Location: [facility];  Service: Gastroenterology        02/11/25 1148   OT Last Visit   OT Visit Date 02/11/25  (Tuesday)   Note Type   Note type Evaluation  (Eval 9066-1692)   Additional Comments Identified pt by full name and birthdate. Prefers \"[place]\"   Pain Assessment   Pain Assessment Tool 0-10   Pain Score No Pain   Restrictions/Precautions   Weight Bearing Precautions Per Order No   Other Precautions " Cognitive;Chair Alarm;Bed Alarm;Fall Risk;Visual impairment;Telemetry;Multiple lines  (Adolph, premorbid R UE hemiparesis)   Home Living   Type of Home House;Apartment   Home Layout Two level;Other (Comment)  (1+1 ASHOK vs ramp on side)   Bathroom Shower/Tub Walk-in shower   Bathroom Toilet Raised   Bathroom Equipment Commode;Shower chair;Grab bars in shower;Hand-held shower   Bathroom Accessibility Accessible   Home Equipment Walker;Cane;Electric scooter;Grab bars   Additional Comments Pt reports living w/ his girlfriend / significant other and her father in 2 story. Pt reports maintaining first floor set- up since DC home from rehab.   Prior Function   Level of [place] Independent with ADLs;Independent with functional mobility;Needs assistance with IADLS  (does not drive, girlfriend manages meds)   Lives With Spouse;Family  (girlfriend and her father)   Receives Help From Family;Outpatient therapy   IADLs Family/Friend/Other provides transportation;Family/Friend/Other provides meals;Family/Friend/Other provides medication management  (pt reports girlfriend manages meds; pt able to prepare light meals)   Falls in the last 6 months 1 to 4  (1 since DC home from rehab)   Vocational On disability   Comments Pt reports no AD w/ in home and cane vs electric scooter in community. 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      Unchanged chronic infarcts in left corona radiata and left striatocapsular regions with mild chronic microangiopathy. MRI pend/ordered   Family/Caregiver Present  "No   Additional General Comments Personal and environmental factors supporting performance includes independent at baseline, supportive family / significant other, first floor set- up; barriers include inability to complete IADLs, fall history, recent admission (s)   Subjective   Subjective \"If she gets me a cane we can walk anywhere\"   ADL   Where Assessed Edge of bed   Eating Assistance 6  Modified independent   Eating Deficit Setup   Grooming Assistance 5  Supervision/Setup   Grooming Deficit Setup;Supervision/safety;Increased time to complete   UB Bathing Assistance Unable to assess   LB Bathing Assistance Unable to assess   UB Dressing Assistance 4  Minimal Assistance   UB Dressing Deficit Fasteners;Thread RUE;Setup   LB Dressing Assistance Unable to assess  (anticipate S w/ + time after set- up excluding fasteners)   Toileting Assistance  5  Supervision/Setup   Toileting Deficit Setup;Bedside commode  (simulated seated on commode)   Additional Comments Pt completing ADLs at / near functional baseline prior to admission.   Bed Mobility   Supine to Sit 6  Modified independent   Additional items Assist x 1;HOB elevated;Bedrails  (to pt's L to simulate set- up at home)   Sit to Supine Unable to assess   Additional Comments Pt seated OOB in chair post eval w/ needs met, call bell in reach and chair alarm activated   Transfers   Sit to Stand 6  Modified independent   Additional items Assist x 1;Increased time required;Bedrails;Armrests   Stand to Sit 6  Modified independent   Additional items Assist x 1;Bedrails;Armrests;Increased time required   Stand pivot 5  Supervision   Additional items Assist x 1;Verbal cues  (using cane on L)   Additional Comments Pt performed sit <> stand 2X   Functional Mobility   Functional Mobility 5  Supervision   Additional Comments household distances using cane on L household distances   Additional items SPC   Balance   Static Sitting Good   Dynamic Sitting Fair +   Static Standing " Fair +   Ambulatory Fair   Activity Tolerance   Activity Tolerance Patient tolerated treatment well   Medical Staff Made Aware spoke w/ CM, Janell; coordinated care w/ PT, Shruti due to decreased activity tolerance, regression from baseline   Nurse Made Aware spoke w/ RNRuma   Assessment   Limitation Decreased ADL status;Decreased UE ROM;Decreased UE strength;Decreased endurance;Decreased fine motor control;Visual deficit;Decreased self-care trans;Decreased high-level ADLs  (impaired activity tolerance, R UE strength, balance, pain)   Assessment Pt is a 58yo male admitted to [facility] on stroke pathway 2/10/25. Significant PMH impacting his occupational performance includes HTN, DM II, hx CVA w/ residual R UE deficits, ADHD, anxiety, Bipolar I disorder, depression, DM, obesity, neuropathy, R hand surgery. Pt reports living w/ his girlfriend and her father in 2 story w/ 1+1 ASHOK. Pt reports I w/ ADLs using cane vs no AD. Pt reports receiving OPOT / PT. Upon eval, pt alert and generally oriented. Able to follow directions and reports limited recall details, timeline recent events leading to admission. Pt demonstrated limited AROM, functional use R UE, and reports working w/ OPOT. Pt required mod I feeding, S grooming, min A UBD, S toileting seated on commode, mod I bed mobility supine to sit to L, mod I sit <> stand and S using cane for household distance functional mobility. Pt is completing ADLs at / near functional baseline prior to admission. Recommend level III rehab resource intensity when medically stable for discharge from acute care. Will continue to follow 1-2X week in acute care.   Goals   Patient Goals Pt stated that he wants to return home and has OPPT/ OT on Friday   LT Time Frame 10-14   Long Term Goal see below   Plan   Treatment Interventions ADL retraining;Functional transfer training;UE strengthening/ROM;Endurance training;Patient/family training;Equipment evaluation/education;Neuromuscular  reeducation;Fine motor coordination activities;Compensatory technique education;Continued evaluation;Energy conservation;Activityengagement   Goal Expiration Date 02/21/25   OT Treatment Day 0  (Tuesday 2/11/25)   OT Frequency 1-2x/wk   Discharge Recommendation   Rehab Resource Intensity Level, OT III (Minimum Resource Intensity)   AM-PAC Daily Activity Inpatient   Lower Body Dressing 3   Bathing 3   Toileting 4   Upper Body Dressing 4   Grooming 4   Eating 4   Daily Activity Raw Score 22   Daily Activity Standardized Score (Calc for Raw Score >=11) 47.1   AM-PAC Applied Cognition Inpatient   Following a Speech/Presentation 4   Understanding Ordinary Conversation 4   Taking Medications 3   Remembering Where Things Are Placed or Put Away 4   Remembering List of 4-5 Errands 3   Taking Care of Complicated Tasks 3   Applied Cognition Raw Score 21   Applied Cognition Standardized Score 44.3   Barthel Index   Feeding 10   Bathing 0   Grooming Score 5   Dressing Score 5   Bladder Score 10   Bowels Score 10   Toilet Use Score 10   Transfers (Bed/Chair) Score 15   Mobility (Level Surface) Score 10   Stairs Score 5   Barthel Index Score 80   Modified Jerry Scale   Modified [place] Scale 3   End of Consult   Education Provided Yes   Nurse Communication Nurse aware of consult   Licensure   [place] License Number  Dana [id number]. Janetzko, OTR/L        The patient's raw score on the AM-PAC Daily Activity Inpatient Short Form is 22. A raw score of greater than or equal to 19 suggests the patient may benefit from discharge to home. Please refer to the recommendation of the Occupational Therapist for safe discharge planning.      Pt goals to be met by 2/21/25 to max I w/ ADLs and improve engagement to return home to [facility] and OPOT on Friday includes:    -Pt will demonstrate improved activity and sitting tolerance OOB in chair for all meals    -Pt will complete functional transfers to bed, chair, and toilet using LRAD, DME as needed  independently    -Pt will consistently engage in functional mobility using LRAD community distances independently    -Pt will complete UBD/LBD w/ mod I for + time w/ out rest break or sign / symptoms of fatigue.     -Pt will demonstrate good attention and understanding R UE AROm / AAROM to improve strength and functional use during ADLs.     -Pt will demonstrate improved AMPAC score to at least 24 to max I w/ ADLs, assist in DC planning        Dana Wasserman, OTR/L  [id number]  [id number]

## 2025-02-11 NOTE — ASSESSMENT & PLAN NOTE
Lab Results   Component Value Date    HGBA1C 6.7 (H) 02/10/2025       Recent Labs     02/10/25  1528 02/10/25  2213 02/11/25  0816 02/11/25  1117   POCGLU 150* 159* 80 137       Blood Sugar Average: Last 72 hrs:  (P) 131.5  Home - NPH 20 units before breakfast and 10 units in the evening     Plan:  Lighting scale algorithm 4

## 2025-02-11 NOTE — DISCHARGE SUMMARY
Discharge Summary - Hospitalist   Name: Tommie Taylor 59 y.o. male I MRN: [id number]  Unit/Bed#: S -01 I Date of Admission: 2/10/2025   Date of Service: 2/11/2025 I Hospital Day: 1     Assessment & Plan  Stroke-like symptoms  PMH of stroke in October 2024 with residual Right upper extremities weakness   Sudden onset generalized weakness, dizziness and feeling of syncope.  No loss of consciousness.  Endorses dysarthria and right facial asymmetry  CT brain - No acute abnormality. Chronic infarct in left corona radiata and left striatocapsular regions with mild chronic microangiopathy  CTA head/neck - Negative for large vessel occlusion, dissection or aneurysm. Severe focal stenossi in bilateral PCA likely due to atherosclerotic disease.  ECHO with bubble study - LVEF 60% G1DD. No PFO detected. Aortic valve sclerosis w/o stenosis.  HgbA1C at 6.7  Lipid panel - cholesterol 129/ triglycerides 89/ HDL 35 low/ LDL 76 wnl  TSH 2.288 wnl  Speech therapy - regular diet and thin liquid   Physical therapy - Discharge home  Physical exam - dysarthria improving. Cranial nerves intact, sensation intact all extremities. Left upper and lower extremity strength 5/5.  Right upper extremity strength 3/5, right lower extremity strength is 4/5.  Per patient this is baseline.  MRI is normal and does not show any acute stroke  PT/OT level III    Plan:  Status post loading aspirin 324 Mg once  Continue aspirin 81 mg daily  Continue Lipitor 20 Mg daily  Gradual return to normal blood pressure  Restart atenolol, hydralazine, nifedipine    Hypokalemia  POA potassium at 2.5  S/P potassium repletion 80 mg  Overnight K at 7.0, but EKG NSR, repeat K at 4.6 within normal limit  Resolved  Repeat BMP in a week  Hypocalcemia  Calcium 6.3, Ionized calcium 0.97   S/P replete calcium gluconate 2g  Ionized calcium 1.19 wnl  Resolved  Repeat BMP in a week  Anemia, unspecified  Hgb 7.4 (Base line 9-10), MCV low at 78, RDW 14 wnl, Platelet 123  No  sign or complain of bleeding   Hgb at 10.5    CVA (cerebral vascular accident) (HCC)  Left corona radiata stroke (Oct 2024) with residual right upper extremity and lower extremity weakness.  Home medication-aspirin 81 Mg and Lipitor 20 Mg daily    Plan:  Continue prior home medication  Type 2 diabetes mellitus (HCC)  Lab Results   Component Value Date    HGBA1C 6.7 (H) 02/10/2025       Recent Labs     02/10/25  1528 02/10/25  2213 02/11/25  0816 02/11/25  1117   POCGLU 150* 159* 80 137       Blood Sugar Average: Last 72 hrs:  (P) 131.5  Home - NPH 20 units before breakfast and 10 units in the evening     Plan:  Continue prior home medication     Medical Problems       Resolved Problems  Date Reviewed: 1/8/2025   None       Discharging Physician / Practitioner: Sally Rivers MD  PCP: Layton Flores MD  Admission Date:   Admission Orders (From admission, onward)       Ordered        02/10/25 1731  INPATIENT ADMISSION  Once                          Discharge Date: 02/11/25    Consultations During Hospital Stay:  Neurology, speech therapy, PT/OT    Procedures Performed:   None    Significant Findings / Test Results:   MRI brain wo contrast = Evolving left corona radiata infarct with near total resolution of the previously noted diffusion restriction. There is now associated gliosis. There is no new territorial type infarct. Mild chronic microangiopathic ischemic changes.   CTA stroke alert Head and Neck = Negative CTA head and neck for large vessel occlusion, dissection, or aneurysm. Severe focal stenosis in bilateral PCA P2 segments likely due to atherosclerotic disease, worsened since 10/14/2024. Additional chronic/incidental findings as detailed above.   CT stroke alert brain = No acute intracranial abnormality. Unchanged chronic infarcts in left corona radiata and left striatocapsular regions with mild chronic microangiopathy.     Incidental Findings:   None       Test Results Pending at Discharge (will  require follow up):   None     Outpatient Tests Requested:  Repeat BMP in a week outpatient followed by PCP    Complications:  None    Reason for Admission: Dysarthria, generalized weakness    Hospital Course:   Tommie Taylor is a 59 y.o. male patient who originally presented to the hospital on  59 y.o. male with a PMH of stroke (oct 2024, with residual right upper side weakness), bipolar disorder, CKD stage IV, type 2 diabetes, hypertension, tardive dyskinesia, sleep apnea, GERD and mild memory deficiency, brought in by EMS and presents with sudden onset of generalized weakness, dizziness and feeling about to pass out during grocery shopping. Patient never lose consciousness, but endorses slurred speech. EMS noted right facial weakness/ asymmetry. In ED, patient found to have hypokalemic at 2.5, hypocalcium at 6.3. Given significant slurred speech, stroke alert was called.     Electrolytes repleted.  CT brain, head and neck and MRI of the brain is negative for acute stroke, does shows prior left corona radiata infarct and chronic microangiopathic changes.  Neurology consulted and recommended to discontinue stroke pathway, and to continue aspirin 81 mg daily and atorvastatin 20 Mg daily.  Patient has outpatient neurology appointment scheduled for 6/2/2026.  PT OT recommend level 3.  Patient is stable for discharge.  She will follow-up with PCP in 1 week to repeat BMP and outpatient follow-up with neurology.      Please see above list of diagnoses and related plan for additional information.     Condition at Discharge: stable    Discharge Day Visit / Exam:   Subjective: Patient has no acute complaints or events overnight.  Patient is ANO x 3.  Patient's dysarthria resolved.  Patient reports that his strength is back at his baseline.  Patient denies chest pain, shortness of breath, abdominal pain, fever, chill, changes with urination or bowel movement.  Vitals: Blood Pressure: 141/78 (02/11/25 1606)  Pulse: 99  "(02/11/25 1606)  Temperature: 98.1 °F (36.7 °C) (02/11/25 1130)  Temp Source: Oral (02/11/25 1130)  Respirations: 18 (02/11/25 1606)  Height: 5' 6\" (167.6 cm) (02/11/25 0810)  Weight - Scale: 117 kg (257 lb 15 oz) (02/11/25 0810)  SpO2: 98 % (02/11/25 1606)  Physical Exam  Vitals reviewed.   Constitutional:       Appearance: Normal appearance.   Eyes:      General: No scleral icterus.        Right eye: No discharge.         Left eye: No discharge.   Cardiovascular:      Rate and Rhythm: Normal rate and regular rhythm.      Pulses: Normal pulses.      Heart sounds: Normal heart sounds. No murmur heard.     No friction rub.   Pulmonary:      Effort: Pulmonary effort is normal. No respiratory distress.      Breath sounds: Normal breath sounds. No stridor. No wheezing or rales.   Chest:      Chest wall: No tenderness.   Abdominal:      General: Bowel sounds are normal. There is no distension.      Palpations: Abdomen is soft.      Tenderness: There is no abdominal tenderness. There is no guarding.   Musculoskeletal:         General: Normal range of motion.      Cervical back: Normal range of motion.      Right lower leg: No edema.      Left lower leg: No edema.   Skin:     General: Skin is warm.      Capillary Refill: Capillary refill takes less than 2 seconds.      Coloration: Skin is not jaundiced or pale.   Neurological:      General: No focal deficit present.      Mental Status: He is alert and oriented to person, place, and time. Mental status is at baseline.      Comments: Right upper extremity strength 4 out of 5  Right lower extremity strength 4 out of 5   Psychiatric:         Mood and Affect: Mood normal.         Behavior: Behavior normal.         Thought Content: Thought content normal.         Judgment: Judgment normal.          Discussion with Family: Updated  (wife) via phone.    Discharge instructions/Information to patient and family:   See after visit summary for information provided to " patient and family.      Provisions for Follow-Up Care:  See after visit summary for information related to follow-up care and any pertinent home health orders.      Mobility at time of Discharge:   Basic Mobility Inpatient Raw Score: 20  JH-HLM Goal: 6: Walk 10 steps or more  JH-HLM Achieved: 8: Walk 250 feet ot more  HLM Goal achieved. Continue to encourage appropriate mobility.     Disposition:   Home    Planned Readmission: None    Discharge Medications:  See after visit summary for reconciled discharge medications provided to patient and/or family.      Administrative Statements   Discharge Statement:      **Please Note: This note may have been constructed using a voice recognition system**

## 2025-02-11 NOTE — ASSESSMENT & PLAN NOTE
Calcium 6.3, Ionized calcium 0.97   S/P replete calcium gluconate 2g    Plan:  Recheck calcium at 2 AM  Replete as needed

## 2025-02-11 NOTE — SPEECH THERAPY NOTE
Speech-Language Pathology Bedside Swallow Evaluation        Patient Name: Tommie Taylor    Today's Date: 2/11/2025     Problem List  Principal Problem:    Stroke-like symptoms  Active Problems:    Anemia, unspecified    Type 2 diabetes mellitus (HCC)    CVA (cerebral vascular accident) (HCC)    Hypokalemia    Hypocalcemia           Summary    Pt presents with normal appearing oral and pharyngeal swallowing skills with no c/o food dysphagia or overt s/s aspiration.      Recommendations:   Diet: regular diet and thin liquids   Meds: whole with liquid   Frequent Oral care: 2x/day  Other Recommendations/ considerations: no follow up tx needed       Current Medical Status  Pt is a 59 y.o. male who presented to [facility]  with stroke like symptoms. Pt brought in by EMS and presents with sudden onset of generalized weakness, dizziness and feeling about to pass out during grocery shopping. Patient never lose consciousness, but endorses slurred speech. EMS noted right facial weakness/ asymmetry.     Past medical history:   Please see H&P for details    Special Studies:  MRI: pending  CT-head: 2/10/25 No acute intracranial abnormality.   Unchanged chronic infarcts in left corona radiata and left striatocapsular regions with mild chronic microangiopathy.    Social/Education/Vocational Hx:  Pt lives  home    Swallow Information   Prior speech/swallowing tx: seen by  for +CVA in Oct 2024  Current Risks for Dysphagia & Aspiration:  stroke like symptoms   Current Symptoms/Concerns:  failed nsg screen for slurred speech and facial droop  Current Diet: NPO   Baseline Diet: regular diet and thin liquids  Takes pills- whole w/ water     Baseline Assessment   Behavior/Cognition: alert  Speech/Language Status: able to participate in conversation and able to follow commands  Patient Positioning: upright in bed     Swallow Mechanism Exam   Facial: symmetrical  Labial: WFL  Lingual: WFL  Velum: unable to visualize  Mandible:  adequate ROM  Dentition: adequate  Vocal quality:clear/adequate   Volitional Cough: strong/productive   Respiratory: RA    Consistencies Assessed and Performance   Consistencies Administered: thin liquids, nectar thick, puree, and soft solids    Oral Stage: pt took large bites of toast and displayed slow but effective mastication/manipulation. Pt also took large/ consec sips of liquids w/ good oral control and transfer. No pocketing or residue noted.     Pharyngeal Stage: swallow initiation appeared timely w/ complete laryngeal excursion and no overt s/s aspiration or c/o food dysphagia.       Esophageal Concerns: none reported      Results Reviewed with: patient, RN, and MD   Dysphagia Goals: none at this time      Mya Watson MA [facility]-SLP  Speech Pathologist  [place] license # [id number]  [place] license # [id number]  Available via Secure Chat

## 2025-02-11 NOTE — ASSESSMENT & PLAN NOTE
PMH of stroke in October 2024 with residual Right upper extremities weakness   Sudden onset generalized weakness, dizziness and feeling of syncope.  No loss of consciousness.  Endorses dysarthria and right facial asymmetry  CT brain - No acute abnormality. Chronic infarct in left corona radiata and left striatocapsular regions with mild chronic microangiopathy  CTA head/neck - Negative for large vessel occlusion, dissection or aneurysm. Severe focal stenossi in bilateral PCA likely due to atherosclerotic disease.  ECHO with bubble study - LVEF 60% G1DD. No PFO detected. Aortic valve sclerosis w/o stenosis.  HgbA1C at 6.7  Lipid panel - cholesterol 129/ triglycerides 89/ HDL 35 low/ LDL 76 wnl  TSH 2.288 wnl  Speech therapy - regular diet and thin liquid   Physical therapy - Discharge home  Physical exam - dysarthria improving. Cranial nerves intact, sensation intact all extremities. Left upper and lower extremity strength 5/5.  Right upper extremity strength 3/5, right lower extremity strength is 4/5.  Per patient this is baseline.    Plan:  Status post loading aspirin 324 Mg once  Continue aspirin 81 mg daily  Continue Lipitor 20 Mg daily  MRI brain scheduled for this evening - based on MRI results consider starting Plavix 300 Mg loading dose  Gradual return to normal blood pressure  Hold atenolol, hydralazine, nifedipine for now  Follow up orthostatic blood pressure   Neuro check  Monitor on telemetry  Follow-up OT eval and treat

## 2025-02-11 NOTE — ASSESSMENT & PLAN NOTE
Lab Results   Component Value Date    HGBA1C 6.5 02/05/2025       Recent Labs     02/10/25  1528   POCGLU 150*       Blood Sugar Average: Last 72 hrs:  (P) 150  Home - NPH 20 units before breakfast and 10 units in the evening     Plan:  Lighting scale algorithm 4

## 2025-02-11 NOTE — CASE MANAGEMENT
Case Management Assessment & Discharge Planning Note    Patient name Tommie Taylor  Location S /S -01 MRN [id number]  : 1965 Date 2025       Current Admission Date: 2/10/2025  Current Admission Diagnosis:Stroke-like symptoms   Patient Active Problem List    Diagnosis Date Noted Date Diagnosed    Hypokalemia 02/10/2025     Hypocalcemia 02/10/2025     Goals of care, counseling/discussion 2025     Hospital discharge follow-up 2024     Cerebrovascular accident (CVA) due to occlusion of left middle cerebral artery (HCC) 2024     Hypomagnesemia 11/15/2024     Spasticity 2024     Chronic kidney disease-mineral and bone disorder (CKD-MBD) 2024     Orthostatic hypotension 2024     Fall during current hospitalization 10/31/2024     At risk for alteration in bowel function 10/28/2024     Overgrown toenails 10/28/2024     Impaired mobility and activities of daily living 10/26/2024     Stroke-like symptoms 10/22/2024     Encephalopathy 10/17/2024     Volume overload 10/17/2024     Acid-base disorder, mixed 10/17/2024     CVA (cerebral vascular accident) (HCC) 10/15/2024     Type 2 diabetes mellitus (HCC) 10/08/2024     Vision decreased 2024     Memory deficit 04/15/2024     Tremor 04/15/2024     B12 deficiency 04/15/2024     Hypertension 2023     Tardive dyskinesia 2023     Lightheaded 2023     Loss of appetite 2023     Unintended weight loss 2023     Class 3 severe obesity due to excess calories with serious comorbidity and body mass index (BMI) of 40.0 to 44.9 in adult (HCC) 2023     Vitamin D deficiency 2021     Hyperlipidemia 2020     Toenail deformity 2019     Persistent proteinuria 2019     Anemia, unspecified 2019     DAISY (obstructive sleep apnea)      Generalized anxiety disorder 2018     Hiatal hernia 2018     Lower esophageal ring ([place]) 2018     Insomnia  05/14/2018     Bipolar I disorder, severe, current or most recent episode depressed, with psychotic features (HCC) 11/06/2017     Panic attacks 11/06/2017     GERD (gastroesophageal reflux disease) 09/05/2017     Flat foot 05/03/2016     Diabetic polyneuropathy ([facility]) 12/11/2014     Allergic rhinitis 08/31/2012       LOS (days): 1  Geometric Mean LOS (GMLOS) (days): 4.5  Days to GMLOS:3.6     OBJECTIVE:    Risk of Unplanned Readmission Score: 25.53         Current admission status: Inpatient       Preferred Pharmacy:   [facility] [address] [place], [place] [address] [address] [address]  Phone: [phone] Fax: [phone]    Primary Care Provider: Layton Flores MD    Primary Insurance: MEDICARE  Secondary Insurance: [facility]    ASSESSMENT:  Active Health Care Proxies    There are no active Health Care Proxies on file.                      Patient Information  Admitted from:: Home  Mental Status: Alert  During Assessment patient was accompanied by: Not accompanied during assessment  Assessment information provided by:: Patient  Primary Caregiver: Self  Support Systems: Family members  County of Residence: [place]  What [place] do you live in[place]  Home entry access options. Select all that apply.: Stairs, Ramp (ramp thru side entry)  Number of steps to enter home.: 2 (front entry)  Type of Current Residence: 2 [place] home  Upon entering residence, is there a bedroom on the main floor (no further steps)?: Yes  Upon entering residence, is there a bathroom on the main floor (no further steps)?: Yes  Living Arrangements: Lives w/ Spouse/significant other, Other (Comment) (significant other's father)    Activities of Daily Living Prior to Admission  Functional Status: Independent  Completes ADLs independently?: Yes  Ambulates independently?: Yes  Does patient use assisted devices?: Yes  Assisted Devices (DME) used: Straight Cane, Bedside Commode, Shower Chair, Other  (Comment) (walk-in shower, grab bars in shower)  Does patient currently own DME?: Yes  What DME does the patient currently own?: Straight Cane, Walker, Electric Wheelchair, Bedside Commode, Other (Comment) (walk-in shower, grab bars in shower)  Does patient have a history of Outpatient Therapy (PT/OT)?: Yes  Does the patient have a history of Short-Term Rehab?: Yes  Does patient have a history of HHC?: Yes  Does patient currently have HHC?: No         Patient Information Continued  Does patient have prescription coverage?: Yes  Does patient receive dialysis treatments?: No         Means of Transportation  Means of Transport to [facility]:: Family transport      Social Determinants of Health (SDOH)      Flowsheet Row Most Recent Value   Housing Stability    In the last 12 months, was there a time when you were not able to pay the mortgage or rent on time? N   At any time in the past 12 months, were you homeless or living in a shelter (including now)? N   Transportation Needs    In the past 12 months, has lack of transportation kept you from medical appointments or from getting medications? no   In the past 12 months, has lack of transportation kept you from meetings, work, or from getting things needed for daily living? No   Food Insecurity    Within the past 12 months, you worried that your food would run out before you got the money to buy more. Never true   Within the past 12 months, the food you bought just didn't last and you didn't have money to get more. Never true   Utilities    In the past 12 months has the electric, gas, oil, or water company threatened to shut off services in your home? No            DISCHARGE DETAILS:    Discharge planning discussed with:: patient  Freedom of Choice: Yes  Comments - Freedom of Choice: PT/OT rcmd continued OPPT/OT. Patient aware and agreeable to same.                                         Treatment Team Recommendation: Home  Discharge Destination Plan:: Home  Transport at  Discharge : Family

## 2025-02-11 NOTE — ASSESSMENT & PLAN NOTE
POA potassium at 2.5  S/P potassium repletion 80 mg  Overnight K at 7.0, but EKG NSR, repeat K at 4.6 within normal limit  Resolved  Repeat BMP in a week

## 2025-02-11 NOTE — PLAN OF CARE
Problem: OCCUPATIONAL THERAPY ADULT  Goal: Performs self-care activities at highest level of function for planned discharge setting.  See evaluation for individualized goals.  Description: Treatment Interventions: ADL retraining, Functional transfer training, UE strengthening/ROM, Endurance training, Patient/family training, Equipment evaluation/education, Neuromuscular reeducation, Fine motor coordination activities, Compensatory technique education, Continued evaluation, Energy conservation, Activityengagement          See flowsheet documentation for full assessment, interventions and recommendations.   Note: Limitation: Decreased ADL status, Decreased UE ROM, Decreased UE strength, Decreased endurance, Decreased fine motor control, Visual deficit, Decreased self-care trans, Decreased high-level ADLs (impaired activity tolerance, R UE strength, balance, pain)     Assessment: Pt is a 60yo male admitted to [facility] on stroke pathway 2/10/25. Significant PMH impacting his occupational performance includes HTN, DM II, hx CVA w/ residual R UE deficits, ADHD, anxiety, Bipolar I disorder, depression, DM, obesity, neuropathy, R hand surgery. Pt reports living w/ his girlfriend and her father in 2 story w/ 1+1 ASHOK. Pt reports I w/ ADLs using cane vs no AD. Pt reports receiving OPOT / PT. Upon eval, pt alert and generally oriented. Able to follow directions and reports limited recall details, timeline recent events leading to admission. Pt demonstrated limited AROM, functional use R UE, and reports working w/ OPOT. Pt required mod I feeding, S grooming, min A UBD, S toileting seated on commode, mod I bed mobility supine to sit to L, mod I sit <> stand and S using cane for household distance functional mobility. Pt is completing ADLs at / near functional baseline prior to admission. Recommend level III rehab resource intensity when medically stable for discharge from acute care. Will continue to follow 1-2X week in acute  care.     Rehab Resource Intensity Level, OT: III (Minimum Resource Intensity)

## 2025-02-11 NOTE — ASSESSMENT & PLAN NOTE
PMH of stroke in October 2024 with residual Right upper extremities weakness   Sudden onset generalized weakness, dizziness and feeling of syncope.  No loss of consciousness.  Endorses dysarthria and right facial asymmetry  CT brain - No acute abnormality. Chronic infarct in left corona radiata and left striatocapsular regions with mild chronic microangiopathy  CTA head/neck - Negative for large vessel occlusion, dissection or aneurysm. Severe focal stenossi in bilateral PCA likely due to atherosclerotic disease.  Physical exam - dysarthria improving. Cranial nerves intact, sensation intact all extremities. Left upper and lower extremity strength 5/5.  Right upper extremity strength 3/5, right lower extremity strength is 4/5.  Per patient this is baseline.    Plan:  Status post loading aspirin 324 Mg once  Continue aspirin 81 mg daily  Continue Lipitor 20 Mg daily  Follow-up MRI brain - based on MRI results consider starting Plavix 300 Mg loading dose  Follow-up echo with bubble study  Follow-up lipid, A1c  Permissive hypertension over 220/120 for the first 24 hours and then gradual return to normal blood pressure  Hold atenolol, hydralazine, nifedipine for now  Neuro check  Monitor on telemetry  Follow-up PT/OT eval and treat  Bedside swallow study

## 2025-02-11 NOTE — ASSESSMENT & PLAN NOTE
PMH of stroke in October 2024 with residual Right upper extremities weakness   Sudden onset generalized weakness, dizziness and feeling of syncope.  No loss of consciousness.  Endorses dysarthria and right facial asymmetry  CT brain - No acute abnormality. Chronic infarct in left corona radiata and left striatocapsular regions with mild chronic microangiopathy  CTA head/neck - Negative for large vessel occlusion, dissection or aneurysm. Severe focal stenossi in bilateral PCA likely due to atherosclerotic disease.  ECHO with bubble study - LVEF 60% G1DD. No PFO detected. Aortic valve sclerosis w/o stenosis.  HgbA1C at 6.7  Lipid panel - cholesterol 129/ triglycerides 89/ HDL 35 low/ LDL 76 wnl  TSH 2.288 wnl  Speech therapy - regular diet and thin liquid   Physical therapy - Discharge home  Physical exam - dysarthria improving. Cranial nerves intact, sensation intact all extremities. Left upper and lower extremity strength 5/5.  Right upper extremity strength 3/5, right lower extremity strength is 4/5.  Per patient this is baseline.  MRI is normal and does not show any acute stroke  PT/OT level III    Plan:  Status post loading aspirin 324 Mg once  Continue aspirin 81 mg daily  Continue Lipitor 20 Mg daily  Gradual return to normal blood pressure  Restart atenolol, hydralazine, nifedipine

## 2025-02-12 ENCOUNTER — PATIENT OUTREACH (OUTPATIENT)
Dept: INTERNAL MEDICINE CLINIC | Facility: CLINIC | Age: 60
End: 2025-02-12

## 2025-02-12 ENCOUNTER — TRANSITIONAL CARE MANAGEMENT (OUTPATIENT)
Dept: INTERNAL MEDICINE CLINIC | Facility: CLINIC | Age: 60
End: 2025-02-12

## 2025-02-12 DIAGNOSIS — Z74.8 ASSISTANCE NEEDED WITH TRANSPORTATION: Primary | ICD-10-CM

## 2025-02-12 NOTE — PROGRESS NOTES
Outpatient Care Management Note:    Re:  hospital follow up call     Received ADT alert and chart reviewed. Patient was at [facility] from 2/10-25 for stroke-like symptoms. CT of brain, head, neck and MRI of brain were negative for acute stroke. Did show prior let corona radiata infarct and chronic microangiopathic changes. Neurology recommended continues Aspirin 81 mg daily and Atorvastatin 20 mg daily. Patient to follow up with Neurology and as appointment on 26. Patient to follow up with PCP and repeat BMP (labwork). PT/OT recommended.     I called and spoke with patient and his significant other Marie. Patient reports feeling better and denies any new symptoms or complaints. Reviewed if he were to have  worsening slurred speech, generalized weakness, shortness of breath, abdominal pain, fever, chill, changes with urination/bowel movement to go back to emergency room for further evaluation.     I was able to review medication with him. Patient has the following medication and is taking:     Omeprazole 20 mg daily  Aspirin 81 mg daily  Nifedipine 90 mg daily   Atenolol 25 mg daily   Hydralazine 10 mg daily (reviewed this medication was stopped on  at PCP appointment)  Vitamin D 50,000 units once a week (reviewed that patient was sent Vitamin D3 2,000 units to pharmacy on )      Has but not takin/30 insulin    Per Marie patient does not eat breakfast and has lunch and dinner. Requesting to know further plan with insulin. She was asking I patient should be taking Lantus and reviewed with her he is to only be taking the 70/30 insulin.     Patient confirms he has glucometer and supplies but has yet to start testing blood sugar. Reviewed importance of testing blood sugar and taking medication as prescribed. Set goal to start testing blood sugar every morning and keep log starting tomorrow. Marie to assist patient with this. She is managing his medications and does express she is not sure  what medications he should all be taking. Medications reviewed and directions and what each medication is for. Suggested using a pill box to setup for the week but declines.     Reviewed he needs to be taking Atorvastatin 20 mg daily for cholesterol and due to previous stroke. I did call pharmacy and they have script on file, also atenolol 25 mg as he is running low, and Vitamin D 2,000 units. However, pharmacy is reporting they still need patient's insurance information. I did leave message for patient and Marie that medication is on file at pharmacy but need insurance information to further process. Marie previously said his drug plan was to start 2/1.     Reports has Olanzapine (Zyprexa) they are asking what this medication is for. I did review info and reviewed that it appears this medication was discontinued in the past. Encouraged they reach out to [facility] to discuss further. Reviewed next therapy appointment is on 2/24.    Patient plans on attending PT/OT on Friday 2/14 @ noon.     Patient scheduled for hospital follow up appointment on Friday 2/14 @ 9:40 am. Instructed to bring all medications with them and blood sugar readings. Marie's father helps transport patient to appointments. Interested in obtaining LantaVan and will plan referral for [facility] to assist. Marie reports she is not also available for his appointments due to her dialysis and PT schedule. Encouraged that she come to appointment on Friday.     Per Marie patient has a Zio patch placed last week and it feel off last night. Instructed to reach out to Cardiology office for further instructions. They have contact number.     They did ask about his potassium level. I did review when he presented to hospital is it was 2.5 and when discharged was 4.6. Reviewed need for labwork to recheck BMP which will recheck potassium level. Reviewed to complete in 1 week.     They do not have any further questions, concerns, or other needs at this time.  They have my contact number [phone] and PCP office number [phone] if needed. Patient is agreeable for further outreach.

## 2025-02-14 ENCOUNTER — OFFICE VISIT (OUTPATIENT)
Dept: INTERNAL MEDICINE CLINIC | Facility: CLINIC | Age: 60
End: 2025-02-14

## 2025-02-14 ENCOUNTER — APPOINTMENT (OUTPATIENT)
Dept: LAB | Facility: CLINIC | Age: 60
End: 2025-02-14
Payer: MEDICARE

## 2025-02-14 ENCOUNTER — OFFICE VISIT (OUTPATIENT)
Dept: PHYSICAL THERAPY | Facility: CLINIC | Age: 60
End: 2025-02-14
Payer: MEDICARE

## 2025-02-14 ENCOUNTER — PATIENT OUTREACH (OUTPATIENT)
Dept: INTERNAL MEDICINE CLINIC | Facility: CLINIC | Age: 60
End: 2025-02-14

## 2025-02-14 ENCOUNTER — OFFICE VISIT (OUTPATIENT)
Dept: OCCUPATIONAL THERAPY | Facility: CLINIC | Age: 60
End: 2025-02-14
Payer: MEDICARE

## 2025-02-14 VITALS
TEMPERATURE: 97.3 F | RESPIRATION RATE: 18 BRPM | BODY MASS INDEX: 42.01 KG/M2 | HEART RATE: 75 BPM | OXYGEN SATURATION: 100 % | HEIGHT: 66 IN | DIASTOLIC BLOOD PRESSURE: 80 MMHG | WEIGHT: 261.4 LBS | SYSTOLIC BLOOD PRESSURE: 142 MMHG

## 2025-02-14 DIAGNOSIS — Z09 HOSPITAL DISCHARGE FOLLOW-UP: ICD-10-CM

## 2025-02-14 DIAGNOSIS — N17.9 AKI (ACUTE KIDNEY INJURY) (HCC): ICD-10-CM

## 2025-02-14 DIAGNOSIS — N18.6 ANEMIA DUE TO CHRONIC KIDNEY DISEASE, ON CHRONIC DIALYSIS (HCC): ICD-10-CM

## 2025-02-14 DIAGNOSIS — E83.51 HYPOCALCEMIA: ICD-10-CM

## 2025-02-14 DIAGNOSIS — I12.9 TYPE 2 DIABETES MELLITUS WITH STAGE 4 CHRONIC KIDNEY DISEASE AND HYPERTENSION (HCC): ICD-10-CM

## 2025-02-14 DIAGNOSIS — G47.33 OSA (OBSTRUCTIVE SLEEP APNEA): ICD-10-CM

## 2025-02-14 DIAGNOSIS — I10 PRIMARY HYPERTENSION: ICD-10-CM

## 2025-02-14 DIAGNOSIS — E83.9 CHRONIC KIDNEY DISEASE-MINERAL AND BONE DISORDER (CKD-MBD): ICD-10-CM

## 2025-02-14 DIAGNOSIS — E55.9 VITAMIN D DEFICIENCY: ICD-10-CM

## 2025-02-14 DIAGNOSIS — E66.813 CLASS 3 SEVERE OBESITY DUE TO EXCESS CALORIES WITH SERIOUS COMORBIDITY AND BODY MASS INDEX (BMI) OF 40.0 TO 44.9 IN ADULT (HCC): ICD-10-CM

## 2025-02-14 DIAGNOSIS — N18.9 CHRONIC KIDNEY DISEASE-MINERAL AND BONE DISORDER (CKD-MBD): ICD-10-CM

## 2025-02-14 DIAGNOSIS — N18.4 ANEMIA DUE TO STAGE 4 CHRONIC KIDNEY DISEASE  (HCC): ICD-10-CM

## 2025-02-14 DIAGNOSIS — Z86.73 HISTORY OF CVA (CEREBROVASCULAR ACCIDENT): ICD-10-CM

## 2025-02-14 DIAGNOSIS — I95.1 ORTHOSTATIC HYPOTENSION: ICD-10-CM

## 2025-02-14 DIAGNOSIS — D63.1 ANEMIA DUE TO CHRONIC KIDNEY DISEASE, ON CHRONIC DIALYSIS (HCC): ICD-10-CM

## 2025-02-14 DIAGNOSIS — M89.9 CHRONIC KIDNEY DISEASE-MINERAL AND BONE DISORDER (CKD-MBD): ICD-10-CM

## 2025-02-14 DIAGNOSIS — Z86.73 HISTORY OF CVA (CEREBROVASCULAR ACCIDENT): Primary | ICD-10-CM

## 2025-02-14 DIAGNOSIS — E83.42 HYPOMAGNESEMIA: ICD-10-CM

## 2025-02-14 DIAGNOSIS — E11.22 TYPE 2 DIABETES MELLITUS WITH STAGE 4 CHRONIC KIDNEY DISEASE, WITH LONG-TERM CURRENT USE OF INSULIN (HCC): ICD-10-CM

## 2025-02-14 DIAGNOSIS — Z99.2 ANEMIA DUE TO CHRONIC KIDNEY DISEASE, ON CHRONIC DIALYSIS (HCC): ICD-10-CM

## 2025-02-14 DIAGNOSIS — K29.70 GASTRITIS WITHOUT BLEEDING, UNSPECIFIED CHRONICITY, UNSPECIFIED GASTRITIS TYPE: ICD-10-CM

## 2025-02-14 DIAGNOSIS — E66.01 CLASS 3 SEVERE OBESITY DUE TO EXCESS CALORIES WITH SERIOUS COMORBIDITY AND BODY MASS INDEX (BMI) OF 40.0 TO 44.9 IN ADULT (HCC): ICD-10-CM

## 2025-02-14 DIAGNOSIS — E78.2 MIXED HYPERLIPIDEMIA: ICD-10-CM

## 2025-02-14 DIAGNOSIS — Z79.4 TYPE 2 DIABETES MELLITUS WITH STAGE 4 CHRONIC KIDNEY DISEASE, WITH LONG-TERM CURRENT USE OF INSULIN (HCC): ICD-10-CM

## 2025-02-14 DIAGNOSIS — I63.512 CEREBROVASCULAR ACCIDENT (CVA) DUE TO OCCLUSION OF LEFT MIDDLE CEREBRAL ARTERY (HCC): ICD-10-CM

## 2025-02-14 DIAGNOSIS — N18.4 TYPE 2 DIABETES MELLITUS WITH STAGE 4 CHRONIC KIDNEY DISEASE AND HYPERTENSION (HCC): ICD-10-CM

## 2025-02-14 DIAGNOSIS — K21.9 GASTROESOPHAGEAL REFLUX DISEASE WITHOUT ESOPHAGITIS: ICD-10-CM

## 2025-02-14 DIAGNOSIS — E11.22 TYPE 2 DIABETES MELLITUS WITH STAGE 4 CHRONIC KIDNEY DISEASE AND HYPERTENSION (HCC): ICD-10-CM

## 2025-02-14 DIAGNOSIS — R29.898 WEAKNESS OF RIGHT ARM: ICD-10-CM

## 2025-02-14 DIAGNOSIS — N18.4 TYPE 2 DIABETES MELLITUS WITH STAGE 4 CHRONIC KIDNEY DISEASE, WITH LONG-TERM CURRENT USE OF INSULIN (HCC): ICD-10-CM

## 2025-02-14 DIAGNOSIS — E87.6 HYPOKALEMIA: ICD-10-CM

## 2025-02-14 DIAGNOSIS — D63.1 ANEMIA DUE TO STAGE 4 CHRONIC KIDNEY DISEASE  (HCC): ICD-10-CM

## 2025-02-14 DIAGNOSIS — R29.90 STROKE-LIKE SYMPTOMS: Primary | ICD-10-CM

## 2025-02-14 DIAGNOSIS — N18.4 STAGE 4 CHRONIC KIDNEY DISEASE (HCC): ICD-10-CM

## 2025-02-14 PROBLEM — L60.8 TOENAIL DEFORMITY: Chronic | Status: RESOLVED | Noted: 2019-11-01 | Resolved: 2025-02-14

## 2025-02-14 PROBLEM — E87.70 VOLUME OVERLOAD: Status: RESOLVED | Noted: 2024-10-17 | Resolved: 2025-02-14

## 2025-02-14 PROBLEM — R63.0 LOSS OF APPETITE: Status: RESOLVED | Noted: 2023-08-02 | Resolved: 2025-02-14

## 2025-02-14 PROBLEM — H54.7 VISION DECREASED: Status: RESOLVED | Noted: 2024-06-05 | Resolved: 2025-02-14

## 2025-02-14 PROBLEM — R63.4 UNINTENDED WEIGHT LOSS: Status: RESOLVED | Noted: 2023-08-02 | Resolved: 2025-02-14

## 2025-02-14 PROBLEM — G47.00 INSOMNIA: Status: RESOLVED | Noted: 2018-05-14 | Resolved: 2025-02-14

## 2025-02-14 PROBLEM — L60.2 OVERGROWN TOENAILS: Status: RESOLVED | Noted: 2024-10-28 | Resolved: 2025-02-14

## 2025-02-14 PROBLEM — Z74.09 IMPAIRED MOBILITY AND ACTIVITIES OF DAILY LIVING: Status: RESOLVED | Noted: 2024-10-26 | Resolved: 2025-02-14

## 2025-02-14 PROBLEM — F41.0 PANIC ATTACKS: Status: RESOLVED | Noted: 2017-11-06 | Resolved: 2025-02-14

## 2025-02-14 PROBLEM — Z91.89 AT RISK FOR ALTERATION IN BOWEL FUNCTION: Status: RESOLVED | Noted: 2024-10-28 | Resolved: 2025-02-14

## 2025-02-14 PROBLEM — R41.3 MEMORY DEFICIT: Status: RESOLVED | Noted: 2024-04-15 | Resolved: 2025-02-14

## 2025-02-14 PROBLEM — G93.40 ENCEPHALOPATHY: Status: RESOLVED | Noted: 2024-10-17 | Resolved: 2025-02-14

## 2025-02-14 PROBLEM — E87.4 ACID-BASE DISORDER, MIXED: Status: RESOLVED | Noted: 2024-10-17 | Resolved: 2025-02-14

## 2025-02-14 PROBLEM — Z78.9 IMPAIRED MOBILITY AND ACTIVITIES OF DAILY LIVING: Status: RESOLVED | Noted: 2024-10-26 | Resolved: 2025-02-14

## 2025-02-14 PROBLEM — Z71.89 GOALS OF CARE, COUNSELING/DISCUSSION: Status: RESOLVED | Noted: 2025-01-14 | Resolved: 2025-02-14

## 2025-02-14 PROBLEM — E53.8 B12 DEFICIENCY: Status: RESOLVED | Noted: 2024-04-15 | Resolved: 2025-02-14

## 2025-02-14 PROBLEM — R42 LIGHTHEADED: Status: RESOLVED | Noted: 2023-08-02 | Resolved: 2025-02-14
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
## 2025-02-14 PROBLEM — R25.1 TREMOR: Status: RESOLVED | Noted: 2024-04-15 | Resolved: 2025-02-14

## 2025-02-14 LAB
25(OH)D3 SERPL-MCNC: 12.3 NG/ML (ref 30–100)
ALBUMIN SERPL BCG-MCNC: 3.5 G/DL (ref 3.5–5)
ALP SERPL-CCNC: 81 U/L (ref 34–104)
ALT SERPL W P-5'-P-CCNC: 8 U/L (ref 7–52)
ANION GAP SERPL CALCULATED.3IONS-SCNC: 6 MMOL/L (ref 4–13)
AST SERPL W P-5'-P-CCNC: 13 U/L (ref 13–39)
BACTERIA UR QL AUTO: ABNORMAL /HPF
BILIRUB SERPL-MCNC: 0.26 MG/DL (ref 0.2–1)
BILIRUB UR QL STRIP: NEGATIVE
BUN SERPL-MCNC: 20 MG/DL (ref 5–25)
CALCIUM SERPL-MCNC: 9 MG/DL (ref 8.4–10.2)
CHLORIDE SERPL-SCNC: 108 MMOL/L (ref 96–108)
CHOLEST SERPL-MCNC: 122 MG/DL (ref ?–200)
CLARITY UR: CLEAR
CO2 SERPL-SCNC: 24 MMOL/L (ref 21–32)
COLOR UR: COLORLESS
CREAT SERPL-MCNC: 3.5 MG/DL (ref 0.6–1.3)
CREAT UR-MCNC: 70.3 MG/DL
CREAT UR-MCNC: 70.3 MG/DL
ERYTHROCYTE [DISTWIDTH] IN BLOOD BY AUTOMATED COUNT: 14.4 % (ref 11.6–15.1)
FERRITIN SERPL-MCNC: 154 NG/ML (ref 24–336)
GFR SERPL CREATININE-BSD FRML MDRD: 18 ML/MIN/1.73SQ M
GLUCOSE P FAST SERPL-MCNC: 101 MG/DL (ref 65–99)
GLUCOSE UR STRIP-MCNC: ABNORMAL MG/DL
HCT VFR BLD AUTO: 36.4 % (ref 36.5–49.3)
HDLC SERPL-MCNC: 37 MG/DL
HGB BLD-MCNC: 11.7 G/DL (ref 12–17)
HGB UR QL STRIP.AUTO: ABNORMAL
IGA SERPL-MCNC: 188 MG/DL (ref 66–433)
IRON SATN MFR SERPL: 28 % (ref 15–50)
IRON SERPL-MCNC: 56 UG/DL (ref 50–212)
KETONES UR STRIP-MCNC: NEGATIVE MG/DL
LDLC SERPL CALC-MCNC: 57 MG/DL (ref 0–100)
LEUKOCYTE ESTERASE UR QL STRIP: NEGATIVE
MAGNESIUM SERPL-MCNC: 1.8 MG/DL (ref 1.9–2.7)
MCH RBC QN AUTO: 25.5 PG (ref 26.8–34.3)
MCHC RBC AUTO-ENTMCNC: 32.1 G/DL (ref 31.4–37.4)
MCV RBC AUTO: 80 FL (ref 82–98)
MICROALBUMIN UR-MCNC: 1861.6 MG/L
MICROALBUMIN/CREAT 24H UR: 2648 MG/G CREATININE (ref 0–30)
NITRITE UR QL STRIP: NEGATIVE
NON-SQ EPI CELLS URNS QL MICRO: ABNORMAL /HPF
PH UR STRIP.AUTO: 6.5 [PH]
PHOSPHATE SERPL-MCNC: 3.8 MG/DL (ref 2.7–4.5)
PLATELET # BLD AUTO: 209 THOUSANDS/UL (ref 149–390)
PMV BLD AUTO: 10.1 FL (ref 8.9–12.7)
POTASSIUM SERPL-SCNC: 4.1 MMOL/L (ref 3.5–5.3)
PROT SERPL-MCNC: 6.7 G/DL (ref 6.4–8.4)
PROT UR STRIP-MCNC: ABNORMAL MG/DL
PROT UR-MCNC: 249.6 MG/DL
PROT/CREAT UR: 3.6 MG/G{CREAT} (ref 0–0.1)
PTH-INTACT SERPL-MCNC: 111 PG/ML (ref 12–88)
RBC # BLD AUTO: 4.58 MILLION/UL (ref 3.88–5.62)
RBC #/AREA URNS AUTO: ABNORMAL /HPF
SODIUM SERPL-SCNC: 138 MMOL/L (ref 135–147)
SP GR UR STRIP.AUTO: 1.01 (ref 1–1.03)
TIBC SERPL-MCNC: 203 UG/DL (ref 250–450)
TRANSFERRIN SERPL-MCNC: 145 MG/DL (ref 203–362)
TRIGL SERPL-MCNC: 141 MG/DL (ref ?–150)
UIBC SERPL-MCNC: 147 UG/DL (ref 155–355)
UROBILINOGEN UR STRIP-ACNC: <2 MG/DL
WBC # BLD AUTO: 5.42 THOUSAND/UL (ref 4.31–10.16)
WBC #/AREA URNS AUTO: ABNORMAL /HPF

## 2025-02-14 PROCEDURE — 85027 COMPLETE CBC AUTOMATED: CPT

## 2025-02-14 PROCEDURE — 82784 ASSAY IGA/IGD/IGG/IGM EACH: CPT

## 2025-02-14 PROCEDURE — 83970 ASSAY OF PARATHORMONE: CPT

## 2025-02-14 PROCEDURE — 86364 TISS TRNSGLTMNASE EA IG CLAS: CPT

## 2025-02-14 PROCEDURE — 81001 URINALYSIS AUTO W/SCOPE: CPT

## 2025-02-14 PROCEDURE — 97110 THERAPEUTIC EXERCISES: CPT

## 2025-02-14 PROCEDURE — 36415 COLL VENOUS BLD VENIPUNCTURE: CPT

## 2025-02-14 PROCEDURE — 82570 ASSAY OF URINE CREATININE: CPT

## 2025-02-14 PROCEDURE — 84100 ASSAY OF PHOSPHORUS: CPT

## 2025-02-14 PROCEDURE — 80061 LIPID PANEL: CPT

## 2025-02-14 PROCEDURE — 99496 TRANSJ CARE MGMT HIGH F2F 7D: CPT | Performed by: PHYSICIAN ASSISTANT

## 2025-02-14 PROCEDURE — 84156 ASSAY OF PROTEIN URINE: CPT

## 2025-02-14 PROCEDURE — 97112 NEUROMUSCULAR REEDUCATION: CPT

## 2025-02-14 PROCEDURE — 82985 ASSAY OF GLYCATED PROTEIN: CPT

## 2025-02-14 PROCEDURE — 82306 VITAMIN D 25 HYDROXY: CPT

## 2025-02-14 PROCEDURE — 83550 IRON BINDING TEST: CPT

## 2025-02-14 PROCEDURE — 82728 ASSAY OF FERRITIN: CPT

## 2025-02-14 PROCEDURE — 83735 ASSAY OF MAGNESIUM: CPT

## 2025-02-14 PROCEDURE — 83540 ASSAY OF IRON: CPT

## 2025-02-14 PROCEDURE — 82043 UR ALBUMIN QUANTITATIVE: CPT

## 2025-02-14 PROCEDURE — 80053 COMPREHEN METABOLIC PANEL: CPT

## 2025-02-14 RX ORDER — HYDRALAZINE HYDROCHLORIDE 10 MG/1
10 TABLET, FILM COATED ORAL 3 TIMES DAILY
Qty: 90 TABLET | Refills: 2 | Status: SHIPPED | OUTPATIENT
Start: 2025-02-14 | End: 2025-02-14 | Stop reason: ALTCHOICE

## 2025-02-14 NOTE — ASSESSMENT & PLAN NOTE
Left corona radiata stroke Oct 2024 with residual right upper extremity and mild lower extremity weakness. Continue aspirin 81 mg daily and atorvastatin 20 mg daily. Neurology follow up 3/19/25.

## 2025-02-14 NOTE — ASSESSMENT & PLAN NOTE
He does not have atorvastatin at home. Restart atorvastatin 40 mg once daily. Recommend low fat diet.

## 2025-02-14 NOTE — ASSESSMENT & PLAN NOTE
History of DAISY. Has not had CPAP in 3 years. Previous sleep medicine appointments have been cancelled. Reminded to reschedule.

## 2025-02-14 NOTE — ASSESSMENT & PLAN NOTE
Slightly elevated today. Goal blood pressure less than 130/80. Recommend checking blood pressure daily at home. Continue atenolol 25 mg daily and nifedipine 90 mg daily. Limit sodium and salt intake. Avoid alcohol and caffeine.   Orders:    Basic metabolic panel; Future

## 2025-02-14 NOTE — PROGRESS NOTES
Daily Note     Today's date: 2025  Patient name: Tommie Taylor  : 1965  MRN: [id number]  Referring provider: Harshad Romo*  Dx:   Encounter Diagnosis     ICD-10-CM    1. History of CVA (cerebrovascular accident)  Z86.73       2. Weakness of right arm  R29.898           Start Time: 1245  Stop Time: 1335  Total time in clinic (min): 50 minutes    Subjective: Went to the ER and was admitted on 2/10/25 thinking he was having another stroke due to increased dizziness, but once he got to hospital symptoms resolved. Testing was negative for any new stroke and he presents with no changes in function      Objective: See treatment diary below      Assessment: Pt tolerated the treatment well. Pt performed walking on the treadmill to promote cardiovascular endurance and neural priming for the session. Pt was challenged with pool noodle obstacle course for quick turning and RLE clearance. Pt also challenged with stair navigation. However, required single UE support to maintain balance. Pt demonstrated fatigue post-session. Pt would benefit from further skilled PT sessions to address deficits in endurance, strength, balance, activity tolerance, and overall safety needed to maximize the pt's function and quality of life.       Plan: Continue per plan of care.  Progress treatment as tolerated.       Short Term Goal Expiration Date:(2025)  Long Term Goal Expiration Date: (2025)  POC Expiration Date: (2025)    Visit count: 1 of 10; PN due 2025    POC expires Unit limit Auth Expiration date PT/OT/ST + Visit Limit?   25 6 PT/OT 25 $2410 CAP/BOMN                           Visit/Unit Tracking  AUTH Status:  Date   PN        Every 10 visits  Used 1 2 3 4 5 6 1        Remaining  9 8 7 6 5 4 9             Precautions: Hx of stroke, HTN, orthostatic hypotension, T2DM, fall risk       Manuals                        "             Neuro Re-Ed         Pt education  Pt education on FGA, mCTSIB, TUG, and their relation to fall risk        HIGT Solo  1.0 mph   0% incline   4# AW on R   X 8 min      Solo   1.4 mph   0% incline   X 10 min     Neural priming  Solo  B/ UE   3# AW on R  0.7 mph   0% incline   X 8 minutes     STS  No UE with blue med ball  1 x 10     With pink med ball   1 x 10  No UE   2 x 10  No UE   2 x 10  No UE   2 x 10    Hurdles    Solo   1/4 track   3# AW on R   (6) 6\" hurdles  Fwd x 2 laps   Lat x 2 laps  Solo   1/4 track   3# AW on R   (6) 6\" hurdles  Fwd x 2 laps   Lat x 2 laps  Solo   1/4 track   (6) 12\" hurdles  Fwd x 2 laps      Blazepods  Solo   1/4 track   4 pods   (6) scattered pool noodles   5# AW on R   2 min rounds  Round 1: 17 hits   Round 2: 16 hits    Solo  1/4 track   4 pods   5# AW on R   Fwd/bwd walking   1:30 min rounds   Round 1: 13 hits   Round 2: 14 hits  Solo   1/4 track   3# AW on R  4 pods in 4 corners   (Step with R foot)   1 min round   Round 1: 10 hits   Round 2: 10 hits     Step-ups Solo  6\" step 4# AW on R   Fwd x 10 each leg   Solo   6\" step   3# AW on R   No UE   1 x 10 on L   1 x 4 on R   1 x 10 on R     Dynamic Balance  Solo  1/4 track   (Small/med/large) river rocks)   Fwd x 2 laps    Solo   1/4 track   3# AW on R   (3) airex   Fwd x 2 laps   Lat x 2 laps     Solo  1/4 track   3# AW on R  Bwd walking   X 2 laps  Solo   1/4 track   3# AW on R   (3) airex   Fwd x 2 laps   Lat x 2 laps     Solo  1/4 track   3# AW on R   Fwd/bwd walking   X 3 laps  Solo  1/4 track   (2) 6\" steps, (1) 8\" step  Fwd x 3 laps     Solo  1/4 track   (Small/med) river rocks)   Fwd x 3 laps   Lat x 1 lap     Solo  1/4 track   (2) NBOS foam beam   Lat x 3 laps   Tandem x 1 lap     Solo  1/4 track   (2) WBOS foam beams   Fwd x 1 lap   Resisted walking    Hallway (100ft)   Purple TB  3# AW on R  Fwd X 2 laps  Hallway (100ft)   Purple TB   3# AW on R   Fwd x 3 laps     Agility ladder    Solo  1/4 track   3# AW on " R  Fwd (2 feet in) x 2 laps     Lat (2 feet in, 2 feet out) x 1 lap    Ickey shuffle x 1 lap    Stair navigation   Solo  1/4 track   5 # AW on R   Whole staircase under track   Reciprocal pattern   1 UE  X 4 laps       Ther Ex        Pt education  Pt education on 6MWT, 5xSTS, MMT                                                                Ther Activity                        Gait Training                        Modalities

## 2025-02-14 NOTE — PROGRESS NOTES
Occupational Therapy Daily Note:    Today's date: 2025  Patient name: Tommie Taylor  : 1965  MRN: [id number]  Referring provider: Harshad Romo*  Dx:   Encounter Diagnosis   Name Primary?    History of CVA (cerebrovascular accident) Yes                POC expires Unit limit Auth Expiration date PT/OT/ST + Visit Limit?   3/27/25 N/A 25 BOMN                           Visit/Unit Tracking  AUTH Status:  Date           BOMN Used 1 2 3 4           Remaining  7 6 5 4               Duration in weeks: 8 weeks  Plan of care beginning date: 25  Plan of care expiration date: 3/27/25  PN #1 due: 25    Precautions: CVA, HTN, high BP, Orthostatic       Subjective:   No new changes.    Objective: See treatment below.     Thera Ex: In supine, provided manual mobilization of gentle rotational movement to GH joint for pain reduction. PROM proximal > distal with slight pain noted at 90* FF and digit extension for D3 & 4    Neuro re-ed: Donned air cast to R elbow as well as estim to R triceps for increased elbow extension. Started with Pt completing AROM FF with noted body compensation. Pt then with added 4# wt dowel, pt completing AAROM FF 20x with rest breaks after each set of 5. Pt with body compensation towards last set with verbal cues for proper body mechanics. Pt then completing 3x10 sets of protraction (protraction) at 90* FF. Pt with difficulty noted and fatigue post UE strengthening.     Pt then seated EOM using R hand to grasp foam block on table and then reach down to place and release into bucket. Pt use of table and body to orient piece into hand for proper and sustained grasp. Pt instructed to reach down to place with increased focus on elbow extension. Pt with moderate drops overall.     Assessment: Tolerated treatment well. Patient would benefit from continued skilled OT.  Pt benefit from air cast, and continued education on at home stretching. Pt limited with  grasp of blocks in R hand, mat and body assist as needed.     Plan: Continued skilled OT per POC

## 2025-02-14 NOTE — ASSESSMENT & PLAN NOTE
Lab Results   Component Value Date    HGBA1C 6.7 (H) 02/10/2025     Prior A1c 3 months ago 8.6%. It is possible that this A1c is falsely low given the anemia. Reminded patient to complete lab (fructosamine level) for more accurate 2-3 week glucose level which we can attempt to compare to A1c. Restart Novolin 70/30 mix 20 units with breakfast and 10 units with dinner. Begin checking blood sugar at least 2 times daily, fasting and post prandial. Record in log and bring to follow up visit in 2 months. Schedule follow up with endocrinology as well. Call office if blood sugar less than 70 or greater than 300.

## 2025-02-14 NOTE — ASSESSMENT & PLAN NOTE
Reviewed recent admission 2/10/25 for stroke-like symptoms.  CT brain: no acute abnormalities. Chronic infarct in left corona radiata and left striatocapsular regions with mild chronic microangiopathy.  CTA head/neck - Negative for large vessel occlusion, dissection, or aneurysm. Severe focal stenossi in bilateral PCA likely due to atherosclerotic disease.   ECHO with bubble study - LVEF 60% G1DD. No PFO detected. Aortic valve sclerosis w/o stenosis.   Work up was unremarkable for acute CVA. Improved with fluids, regular medications, and blood sugar control.   Will start PT/OT today.

## 2025-02-14 NOTE — PROGRESS NOTES
Transition of Care Visit  Name: Tommie Taylor      : 1965      MRN: [id number]  Encounter Provider: Sally Arenas PA-C  Encounter Date: 2025   [facility]    Assessment & Plan  Stroke-like symptoms  Reviewed recent admission 2/10/25 for stroke-like symptoms.  CT brain: no acute abnormalities. Chronic infarct in left corona radiata and left striatocapsular regions with mild chronic microangiopathy.  CTA head/neck - Negative for large vessel occlusion, dissection, or aneurysm. Severe focal stenossi in bilateral PCA likely due to atherosclerotic disease.   ECHO with bubble study - LVEF 60% G1DD. No PFO detected. Aortic valve sclerosis w/o stenosis.   Work up was unremarkable for acute CVA. Improved with fluids, regular medications, and blood sugar control.   Will start PT/OT today.        Cerebrovascular accident (CVA) due to occlusion of left middle cerebral artery (HCC)  Left corona radiata stroke Oct 2024 with residual right upper extremity and mild lower extremity weakness. Continue aspirin 81 mg daily and atorvastatin 20 mg daily. Neurology follow up 3/19/25.        Type 2 diabetes mellitus with stage 4 chronic kidney disease, with long-term current use of insulin (Formerly [facility])    Lab Results   Component Value Date    HGBA1C 6.7 (H) 02/10/2025     Prior A1c 3 months ago 8.6%. It is possible that this A1c is falsely low given the anemia. Reminded patient to complete lab (fructosamine level) for more accurate 2-3 week glucose level which we can attempt to compare to A1c. Restart Novolin 70/30 mix 20 units with breakfast and 10 units with dinner. Begin checking blood sugar at least 2 times daily, fasting and post prandial. Record in log and bring to follow up visit in 2 months. Schedule follow up with endocrinology as well. Call office if blood sugar less than 70 or greater than 300.        Primary hypertension  Slightly elevated today. Goal blood  pressure less than 130/80. Recommend checking blood pressure daily at home. Continue atenolol 25 mg daily and nifedipine 90 mg daily. Limit sodium and salt intake. Avoid alcohol and caffeine.   Orders:    Basic metabolic panel; Future    Orthostatic hypotension  Previously symptoms likely due to orthostatic hypotension.Hydralazine was discontinued. Ensure adequate hydration.        Mixed hyperlipidemia  He does not have atorvastatin at home. Restart atorvastatin 40 mg once daily. Recommend low fat diet.       Anemia due to stage 4 chronic kidney disease  (HCC)  Baseline hgb 10. Likely anemia of chronic disease with normal iron studies.        Chronic kidney disease-mineral and bone disorder (CKD-MBD)  Followed by nephrology.       DAISY (obstructive sleep apnea)  History of DAISY. Has not had CPAP in 3 years. Previous sleep medicine appointments have been cancelled. Reminded to reschedule.        Gastroesophageal reflux disease without esophagitis  States symptoms controlled. Continue omeprazole 20 mg once daily. Reviewed anti-GERD diet.        Hypokalemia  Repleted during recent admission. Will recheck bmp today.        Hypomagnesemia  Start magnesium 500 mg once daily.        Hypocalcemia  Repleted during recent admission. Will recheck bmp today.        Vitamin D deficiency  He is not currently supplementing. Start 1000 units once daily.            History of Present Illness     Transitional Care Management Review:   Tommie Taylor is a 59 y.o. male here for TCM follow up.     During the TCM phone call patient stated:  TCM Call       Date and time call was made  2/12/2025  3:15 PM    Hospital care reviewed  Records reviewed    Patient was hospitialized at  [facility]    Date of Admission  02/10/25    Date of discharge  02/11/25    Diagnosis  Stroke-like symptoms Principal problem 29.90    Disposition  Home    Were the patients medications reviewed and updated  Yes    Current Symptoms  None          TCM  Call       Should patient be enrolled in [facility] monitoring?  Yes    Scheduled for follow up?  Yes    Did you obtain your prescribed medications  Yes    Do you need help managing your prescriptions or medications  No    Is transportation to your appointment needed  No    I have advised the patient to call PCP with any new or worsening symptoms  SERENITY HUNT MA    Interperter language line needed  No    Counseling  Patient    Counseling topics  instructions for management; Importance of RX compliance          Patient is a 59 year old male with a PMH of stroke with residual right upper extremity weakness, bipolar disorder, CKD stage IV, type 2 diabetes, hypertension, tardive dyskinesia, sleep apnea, GERD and mild memory deficiency presenting for transition of care. He originally presented to the ED on 2/10 due to weakness and feeling faint, like he was going to pass out. States it started when he was grocery shopping. No provocation that he is aware. He was admitted to rule out stroke. Work up was unremarkable and his symptoms resolved during admission. Denies fatigue, weakness, dizziness, and lightheadedness. He has baseline right upper extremity weakness. Denies headaches and visual changes. Denies chest pain, palpitations, and shortness of breath. Overall he states he feels well and offers no complaints. He has not been using his insulin. He was not checking his sugar, but states he did check it last night and this morning. This morning was fasting. Last night his blood sugar was 167 and this morning his blood sugar was 118. He states he is taking his medications, but can not name them or verify his medication list. He is not able to say what he eats on a daily basis, but states he often eats out. He eats 1-2 meals a day depending on the day.      Review of Systems   Constitutional:  Negative for appetite change, chills, diaphoresis, fatigue, fever and unexpected weight change.   HENT:  Negative for congestion,  "hearing loss, sore throat, tinnitus and trouble swallowing.    Eyes:  Negative for visual disturbance.   Respiratory:  Negative for cough, chest tightness, shortness of breath and wheezing.    Cardiovascular:  Negative for chest pain, palpitations and leg swelling.   Gastrointestinal:  Negative for abdominal pain, blood in stool, constipation, diarrhea, nausea and vomiting.   Endocrine: Negative for cold intolerance, heat intolerance, polydipsia, polyphagia and polyuria.   Musculoskeletal:  Negative for arthralgias and myalgias.   Skin:  Negative for rash.   Neurological:  Positive for weakness (right arm). Negative for dizziness, tremors, light-headedness, numbness and headaches.   Hematological:  Negative for adenopathy.   Psychiatric/Behavioral:  Negative for dysphoric mood, self-injury, sleep disturbance and suicidal ideas. The patient is not nervous/anxious.      Objective   /80 (BP Location: Left arm, Patient Position: Sitting, Cuff Size: Large)   Pulse 75   Temp (!) 97.3 °F (36.3 °C) (Temporal)   Resp 18   Ht 5' 6\" (1.676 m)   Wt 119 kg (261 lb 6.4 oz)   SpO2 100%   BMI 42.19 kg/m²     Physical Exam  Vitals and nursing note reviewed.   Constitutional:       General: He is awake. He is not in acute distress.     Appearance: Normal appearance. He is well-developed and well-groomed. He is obese. He is not ill-appearing.   HENT:      Head: Normocephalic and atraumatic.      Right Ear: Tympanic membrane, ear canal and external ear normal.      Left Ear: Tympanic membrane, ear canal and external ear normal.      Nose: Nose normal.      Mouth/Throat:      Mouth: Mucous membranes are moist.      Pharynx: Oropharynx is clear. Uvula midline. No oropharyngeal exudate or posterior oropharyngeal erythema.   Eyes:      General: No scleral icterus.     Extraocular Movements: Extraocular movements intact.      Conjunctiva/sclera: Conjunctivae normal.      Pupils: Pupils are equal, round, and reactive to light. "   Neck:      Vascular: No carotid bruit, hepatojugular reflux or JVD.   Cardiovascular:      Rate and Rhythm: Normal rate and regular rhythm.      Pulses: Normal pulses.           Radial pulses are 2+ on the right side and 2+ on the left side.      Heart sounds: Normal heart sounds. No murmur heard.  Pulmonary:      Effort: Pulmonary effort is normal. No respiratory distress.      Breath sounds: Normal breath sounds and air entry. No decreased air movement. No decreased breath sounds, wheezing, rhonchi or rales.   Abdominal:      General: Abdomen is flat. Bowel sounds are normal. There is no distension.      Palpations: Abdomen is soft. There is no mass.      Tenderness: There is no abdominal tenderness. There is no guarding or rebound.      Hernia: No hernia is present.   Musculoskeletal:         General: Normal range of motion.      Cervical back: Neck supple.      Right lower leg: No edema.      Left lower leg: No edema.   Lymphadenopathy:      Cervical: No cervical adenopathy.   Skin:     General: Skin is warm.      Coloration: Skin is not jaundiced.      Findings: No rash.   Neurological:      General: No focal deficit present.      Mental Status: He is alert. Mental status is at baseline.      Cranial Nerves: Cranial nerves 2-12 are intact.      Sensory: Sensation is intact.      Motor: Weakness (right upper extremity) present.      Gait: Gait is intact.   Psychiatric:         Attention and Perception: Attention normal.         Mood and Affect: Mood and affect normal.         Speech: Speech normal.         Behavior: Behavior normal. Behavior is cooperative.       Medications have been reviewed by provider in current encounter    Administrative Statements   I have spent a total time of 30 minutes in caring for this patient on the day of the visit/encounter including Diagnostic results, Prognosis, Risks and benefits of tx options, Instructions for management, Patient and family education, Importance of tx  compliance, Risk factor reductions, Impressions, Counseling / Coordination of care, Reviewing / ordering tests, medicine, procedures  , and Obtaining or reviewing history  . Topics discussed with the patient / family include symptom assessment and management, medication review, goals of care, and supportive listening.

## 2025-02-14 NOTE — PROGRESS NOTES
Outpatient Care Management Note:    Re:  diabetes    Patient at PCP office today. Patient had to leave before I could speak with him.     Patient still not taking insulin or checking blood sugars. Provider did make patient and significant other aware that they need to call or go to pharmacy and provide insurance info/drug plan info to them to further process scripts.     Please see physician note for additional information.

## 2025-02-14 NOTE — ASSESSMENT & PLAN NOTE
Previously symptoms likely due to orthostatic hypotension.Hydralazine was discontinued. Ensure adequate hydration.

## 2025-02-15 PROBLEM — I63.9 CVA (CEREBRAL VASCULAR ACCIDENT) (HCC): Status: RESOLVED | Noted: 2024-10-15 | Resolved: 2025-02-15

## 2025-02-15 LAB — FRUCTOSAMINE SERPL-SCNC: 208 UMOL/L (ref 0–285)

## 2025-02-17 ENCOUNTER — RESULTS FOLLOW-UP (OUTPATIENT)
Dept: ENDOCRINOLOGY | Facility: CLINIC | Age: 60
End: 2025-02-17

## 2025-02-17 ENCOUNTER — RESULTS FOLLOW-UP (OUTPATIENT)
Dept: GASTROENTEROLOGY | Facility: AMBULARY SURGERY CENTER | Age: 60
End: 2025-02-17

## 2025-02-17 ENCOUNTER — PATIENT OUTREACH (OUTPATIENT)
Dept: INTERNAL MEDICINE CLINIC | Facility: CLINIC | Age: 60
End: 2025-02-17

## 2025-02-17 NOTE — PROGRESS NOTES
Outpatient Care Management Note:    Re:  follow up call - diabetes    I called and spoke with patient's significant other Marie. She shares that she is currently in the hospital with the flu. She does share that patient is feeling well at this time. She reports they did follow up with [facility] pharmacy and confirms they have Atorvastatin 20 mg daily, Vitamin D3 2,000 units daily, atenolol and 70/30 insulin. She shares they are still waiting for drug plan card to come and may be another 2 weeks. They were able to purchase medication out of pocket.     She reports patient blood sugars have been running on the lower side and reports blood sugars in the 80's & 90's. He is not keeping a log at this time. He has not started his 70/30 insulin due to blood sugars in the 80's and 90's when he checks. Strongly encouraged he keep a log with date and time and reading. Encouraged to check when he gets up in the morning, before meals and at bedtime. Instructed to check blood sugar 2 hours after largest meal. She reports he is unable to check blood sugar now due to her not being home as she is assisting with this. Patient has father in law and daughter at home for assistance as needed. Encouraged that they know how to check his blood sugar if needed. Patient does not feel he can check blood sugar himself due to right sided weakness. Reports can get bath and get dressed with minimal assistance. She is unsure if patient can administer insulin himself. Instructed to call with any blood sugars less than 70 or an blood sugars over 300.     Reviewed again about continuous glucose monitors and how they can check blood sugars without fingersticks and can stay in place for several days. Reviewed how they can make him more aware of what his blood sugar is doing throughout the day and give more information for the doctor to make medication adjustments. Patient had Freestyle in the past and issues with is staying in place. Reviewed about Dexcom  and could try this CGM and it comes with an adhesive patch to help reinforce it. Declining at this time and encouraged to further consider.     I also suggested use of pill box for the week to help with managing medications.     Patient's next PCP appointment is on 3/10.     Per further chart review and Endo result note reports recent fructosamine level suggests blood sugars are running on low side. Patient to provide glucose logs to adjustments can be made.    They do not have any further questions or other concerns at this time. They have PCP number and my contact number and agreeable for further outreach.     Will send note to PCP and Endo (Dr. Coppola).

## 2025-02-17 NOTE — PROGRESS NOTES
Daily Note     Today's date: 2025  Patient name: Tommie Taylor  : 1965  MRN: [id number]  Referring provider: Harshad Romo*  Dx:   Encounter Diagnosis     ICD-10-CM    1. History of CVA (cerebrovascular accident)  Z86.73       2. Weakness of right arm  R29.898           Start Time: 1215  Stop Time: 1300  Total time in clinic (min): 45 minutes    Subjective: Leg is feeling tired today      Objective: See treatment diary below      Assessment: Pt tolerated the treatment well. Pt performed walking on the treadmill to promote cardiovascular endurance/neural priming for the session. Pt had RLE fatigued with AW during treadmill walking requiring removal for the remainder. Pt able to tolerate all other interventions with AW. Pt progressing well with STS and stair navigation with improved eccentric control. Pt had most difficulty with resisted lateral stepping demonstrating increased shuffling of R foot. Pt demonstrated fatigue post-session. Pt would benefit from further skilled PT sessions to address deficits in endurance, strength, balance, activity tolerance, and overall safety needed to maximize the pt's function and quality of life.       Plan: Continue per plan of care.  Progress treatment as tolerated.       Short Term Goal Expiration Date:(2025)  Long Term Goal Expiration Date: (2025)  POC Expiration Date: (2025)    Visit count: 2 of 10; PN due 2025    POC expires Unit limit Auth Expiration date PT/OT/ST + Visit Limit?   25 6 PT/OT 25 [id number] CAP/BOMN                           Visit/Unit Tracking  AUTH Status:  Date 01/08 01/13 01/20 01/31 02/03 02/07  PN       Every 10 visits  Used 1 2 3 4 5 6 1 2       Remaining  9 8 7 6 5 4 9 8            Precautions: Hx of stroke, HTN, orthostatic hypotension, T2DM, fall risk       Manuals                                    Neuro Re-Ed         Pt education  Pt education on FGA,  "mCTSIB, TUG, and their relation to fall risk        HIGT Solo  1.0 mph   0% incline   4# AW on R   X 8 min      Solo   1.4 mph   0% incline   X 10 min     Neural priming Solo  1.2 mph   0% incline   4# AW on R   X 4 min     X 5 min without AW     X 9 min total     Neural priming  Solo  B/ UE   3# AW on R  0.7 mph   0% incline   X 8 minutes     STS  No UE with blue med ball  1 x 10     With pink med ball   1 x 10  With pink med ball   2 x 10  No UE   2 x 10  No UE   2 x 10    Hurdles    Solo  1/4 track   4# AW on R   (6) 12\" hurdles   Fwd x 2 laps   Solo   1/4 track   3# AW on R   (6) 6\" hurdles  Fwd x 2 laps   Lat x 2 laps  Solo   1/4 track   (6) 12\" hurdles  Fwd x 2 laps      Blazepods  Solo   1/4 track   4 pods   (6) scattered pool noodles   5# AW on R   2 min rounds  Round 1: 17 hits   Round 2: 16 hits    Solo  1/4 track   4 pods   5# AW on R   Fwd/bwd walking   1:30 min rounds   Round 1: 13 hits   Round 2: 14 hits       Step-ups Solo  6\" step 4# AW on R   Fwd x 10 each leg        Dynamic Balance  Solo  1/4 track   (Small/med/large) river rocks)   Fwd x 2 laps    Solo  1/4 track   4# AW on R   Bwd walking   X 2 laps     Solo  1/4 track   (2) NBOS foam beams   Tandem walking   X 1 lap    Solo  1/4 track   Tandem walking on firm   X 1 lap  Solo   1/4 track   3# AW on R   (3) airex   Fwd x 2 laps   Lat x 2 laps     Solo  1/4 track   3# AW on R   Fwd/bwd walking   X 3 laps  Solo  1/4 track   (2) 6\" steps, (1) 8\" step  Fwd x 3 laps     Solo  1/4 track   (Small/med) river rocks)   Fwd x 3 laps   Lat x 1 lap     Solo  1/4 track   (2) NBOS foam beam   Lat x 3 laps   Tandem x 1 lap     Solo  1/4 track   (2) WBOS foam beams   Fwd x 1 lap   Resisted walking    Solo  1/4 track   4# AW on R   Lateral stepping with 5 spots on floor for visual cue  X 2 laps  Hallway (100ft)   Purple TB   3# AW on R   Fwd x 3 laps     Agility ladder    Solo  1/4 track   3# AW on R  Fwd (2 feet in) x 2 laps     Lat (2 feet in, 2 feet out) x 1 " lap    Ickey shuffle x 1 lap    Stair navigation   Solo  1/4 track   5 # AW on R   Whole staircase under track   Reciprocal pattern   1 UE  X 4 laps  Solo  1/4 track   4 # AW on R   Whole staircase under track   Reciprocal pattern   1 UE  X 4 laps      Ther Ex        Pt education  Pt education on 6MWT, 5xSTS, MMT                                                                Ther Activity                        Gait Training                        Modalities

## 2025-02-18 ENCOUNTER — PATIENT OUTREACH (OUTPATIENT)
Dept: INTERNAL MEDICINE CLINIC | Facility: CLINIC | Age: 60
End: 2025-02-18

## 2025-02-19 ENCOUNTER — OFFICE VISIT (OUTPATIENT)
Dept: OCCUPATIONAL THERAPY | Facility: CLINIC | Age: 60
End: 2025-02-19
Payer: MEDICARE

## 2025-02-19 ENCOUNTER — RESULTS FOLLOW-UP (OUTPATIENT)
Dept: NEUROLOGY | Facility: CLINIC | Age: 60
End: 2025-02-19

## 2025-02-19 ENCOUNTER — OFFICE VISIT (OUTPATIENT)
Dept: PHYSICAL THERAPY | Facility: CLINIC | Age: 60
End: 2025-02-19
Payer: MEDICARE

## 2025-02-19 DIAGNOSIS — Z86.73 HISTORY OF CVA (CEREBROVASCULAR ACCIDENT): Primary | ICD-10-CM

## 2025-02-19 DIAGNOSIS — R29.898 WEAKNESS OF RIGHT ARM: ICD-10-CM

## 2025-02-19 PROCEDURE — 97112 NEUROMUSCULAR REEDUCATION: CPT

## 2025-02-19 PROCEDURE — 97530 THERAPEUTIC ACTIVITIES: CPT

## 2025-02-19 PROCEDURE — 97140 MANUAL THERAPY 1/> REGIONS: CPT

## 2025-02-19 NOTE — PROGRESS NOTES
Occupational Therapy Daily Note:    Today's date: 2025  Patient name: Tommie Taylor  : 1965  MRN: [id number]  Referring provider: Harshad Romo*  Dx:   Encounter Diagnosis   Name Primary?    History of CVA (cerebrovascular accident) Yes     POC expires Unit limit Auth Expiration date PT/OT/ST + Visit Limit?   3/27/25 N/A 25 BOMN                           Visit/Unit Tracking  AUTH Status:  Date          BOMN Used 1 2 3 4 5          Remaining  7 6 5 4 3              Duration in weeks: 8 weeks  Plan of care beginning date: 25  Plan of care expiration date: 3/27/25  PN #1 due: 25    Precautions: CVA, HTN, high BP, Orthostatic          Subjective: No concerns or complaints reported.     Objective: See treatment below.  Session focusing on proximal strength/stability, coordination, intrinsic strengthening and overall activity endurance improving safety and indep in ADL/IADL mngt    Neuro Re-ed: Pt tolerated electrical stimulation with pad placement to infra/supra spinatus, ant/middle delt while at same time engaging in therex using 3# dowel bar supine on mat completing 15 reps of chest press into FF achieving near 1/2 end range. Post IASTM, donning elbow air cast for sustained elbow extension during movement pt completed 15 reps AAROM in FF to near 1/2 end range.     Session concluded with pt seated EOM engaging in fxl reaching task to retreive dowels w/cylindrical grasp placing to vertical target positioned midline of pt.  Pt required tactile cues to decrease trunk/shoulder compensation when reaching and stabilization to wrist, verbal cues for open hand when placing dowels.  Pt noted with droppage <25% of time.      Thera Act: MH applied to biceps w/massager for tone reduction followed by prolonged stretch focusing on tricep extension and in preparation for fxl reaching task.     Assessment: Tolerated treatment well. Pt responded well to tactile cues  improving fxl reaching abilities with mass practice.  Pt noted with weak grasp but able to sustain grasp on dowel during transfer to target.  Unable to fully extend digits during release of dowel.  Patient would benefit from continued skilled OT.    Plan: Continued skilled OT per POC

## 2025-02-21 ENCOUNTER — APPOINTMENT (OUTPATIENT)
Dept: OCCUPATIONAL THERAPY | Facility: CLINIC | Age: 60
End: 2025-02-21
Payer: MEDICARE

## 2025-02-21 DIAGNOSIS — Z86.73 HISTORY OF CVA (CEREBROVASCULAR ACCIDENT): Primary | ICD-10-CM

## 2025-02-21 DIAGNOSIS — R29.898 WEAKNESS OF RIGHT ARM: ICD-10-CM

## 2025-02-21 NOTE — PROGRESS NOTES
Occupational Therapy Daily Note:    Today's date: 2025  Patient name: Tommie Taylor  : 1965  MRN: [id number]  Referring provider: Harshad Romo*  Dx:   Encounter Diagnoses   Name Primary?    History of CVA (cerebrovascular accident) Yes    Weakness of right arm      POC expires Unit limit Auth Expiration date PT/OT/ST + Visit Limit?   3/27/25 N/A 25 BOMN                           Visit/Unit Tracking  AUTH Status:  Date         BOMN Used 1 2 3 4 5 6         Remaining  7 6 5 4 3 2             Duration in weeks: 8 weeks  Plan of care beginning date: 25  Plan of care expiration date: 3/27/25  PN #1 due: 25    Precautions: CVA, HTN, high BP, Orthostatic          Subjective: ***    Objective: See treatment below.     Neuro Re-ed:     Thera Ex:     Thera Act:      Self-Care:       Assessment: Tolerated treatment well. Patient would benefit from continued skilled OT.    Plan: Continued skilled OT per POC    :  [phone]  [phone]

## 2025-02-22 LAB — TTG IGA SER IA-ACNC: 1 U/ML (ref ?–10)

## 2025-02-24 ENCOUNTER — PATIENT OUTREACH (OUTPATIENT)
Dept: INTERNAL MEDICINE CLINIC | Facility: CLINIC | Age: 60
End: 2025-02-24

## 2025-02-24 ENCOUNTER — TELEPHONE (OUTPATIENT)
Dept: INTERNAL MEDICINE CLINIC | Facility: CLINIC | Age: 60
End: 2025-02-24

## 2025-02-24 ENCOUNTER — TELEPHONE (OUTPATIENT)
Dept: PSYCHIATRY | Facility: CLINIC | Age: 60
End: 2025-02-24

## 2025-02-24 NOTE — PROGRESS NOTES
Outpatient Care Management Note:    Re:  diabetes follow up     I called patient and no answer and unable to leave message at this time. I attempted to call twice. I was able to leave message during 2nd attempt. I left message with reason for call.     Calling to see how patient is managing with taking insulin and checking blood sugars. Provider further diabetes education.

## 2025-02-25 NOTE — TELEPHONE ENCOUNTER
NO-SHOW LETTER MAILED TO Tommie Taylor.  [address] [place] [address]  And to ptFernando Casanova.

## 2025-02-26 ENCOUNTER — OFFICE VISIT (OUTPATIENT)
Dept: OCCUPATIONAL THERAPY | Facility: CLINIC | Age: 60
End: 2025-02-26
Payer: MEDICARE

## 2025-02-26 ENCOUNTER — OFFICE VISIT (OUTPATIENT)
Dept: PHYSICAL THERAPY | Facility: CLINIC | Age: 60
End: 2025-02-26
Payer: MEDICARE

## 2025-02-26 DIAGNOSIS — Z86.73 HISTORY OF CVA (CEREBROVASCULAR ACCIDENT): Primary | ICD-10-CM

## 2025-02-26 DIAGNOSIS — R29.898 WEAKNESS OF RIGHT ARM: ICD-10-CM

## 2025-02-26 PROCEDURE — 97112 NEUROMUSCULAR REEDUCATION: CPT

## 2025-02-26 PROCEDURE — 97530 THERAPEUTIC ACTIVITIES: CPT

## 2025-02-26 PROCEDURE — 97110 THERAPEUTIC EXERCISES: CPT

## 2025-02-26 NOTE — PROGRESS NOTES
"Occupational Therapy Daily Note:    Today's date: 2025  Patient name: Tommie Taylor  : 1965  MRN: [id number]  Referring provider: Harshad Romo*  Dx:   Encounter Diagnoses   Name Primary?    History of CVA (cerebrovascular accident) Yes    Weakness of right arm           POC expires Unit limit Auth Expiration date PT/OT/ST + Visit Limit?   3/27/25 N/A 25 BOMN                           Visit/Unit Tracking  AUTH Status:  Date  2       BOMN Used 1 2 3 4 5 6 7        Remaining  7 6 5 4 3 2 1            Duration in weeks: 8 weeks  Plan of care beginning date: 25  Plan of care expiration date: 3/27/25  PN #1 due: 25    Precautions: CVA, HTN, high BP, Orthostatic        Subjective: \"My arm gets tired\".     Objective: See treatment below.     Thera Ex: Pt engaged in therex focusing on intrinsic/extrinsic strengthening improving FMC/FMS and grasping abilities for small closure and container mngt during ADL/IADL engagement. Pt completed 1.5# digiflex x2 w/2 sec hold followed by red flex bar in pronation and supination, x10.  Pt stabilized felx bar in left hand flexing with right 2x5.       Towel slides table top donning 2# CW in protraction/retraction and circumduction, x15, each way.  Pt required blocking to shoulder to decreased compensatio during active movements.    Theract: Session concluded with gross motor task of threading large wooden beads onto shoe lace stabilizing and threading with shoelace and stabilizing large bead in right hand b/t D1&2.  Pt noted with droppage >50% of time, difficult for pt to sustain bead in pincer  2* fatigue.     Assessment: Tolerated treatment well. Pt utilized LUE to support RUE throughout session due to fatigue. Pt required rest breaks throughout session. Yellow sponge provided to pt to improve hand strength for carryover at home.  Patient would benefit from continued skilled OT.    Plan: Continued skilled OT " per POC

## 2025-02-26 NOTE — PROGRESS NOTES
"Daily Note     Today's date: 2025  Patient name: Tommie Taylor  : 1965  MRN: [id number]  Referring provider: Harshad Romo*  Dx:   Encounter Diagnosis     ICD-10-CM    1. History of CVA (cerebrovascular accident)  Z86.73       2. Weakness of right arm  R29.898           Start Time: 1200  Stop Time: 1245  Total time in clinic (min): 45 minutes    Subjective: Thinking about wanting to stop therapy because he has no time to himself      Objective: See treatment diary below      Assessment: Pt tolerated the treatment well. Pt performed walking on the treadmill to promote cardiovascular endurance/neural priming for the session. Pt was challenged with lateral stepping on 8\" step. Pt progressing well with LE strength and able to tolerate despite some fatigue. Pt demonstrated increased speed and foot clearance with backwards walking this session. Pt educated on benefits of PT due to continued deficits of RLE as well as benefits of OT for RUE weakness. Will perform progress update next session and discuss POC. Pt demonstrated fatigue post-session. Pt would benefit from further skilled PT sessions to address deficits in endurance, strength, balance, activity tolerance, and overall safety needed to maximize the pt's function and quality of life.       Plan: Continue per plan of care.  Progress note during next visit.  Progress treatment as tolerated.       Short Term Goal Expiration Date:(2025)  Long Term Goal Expiration Date: (2025)  POC Expiration Date: (2025)    Visit count: 3 of 10; PN due 2025    POC expires Unit limit Auth Expiration date PT/OT/ST + Visit Limit?   25 6 PT/OT 25 $[id number] CAP/BOMN                           Visit/Unit Tracking  AUTH Status:  Date   PN      Every 10 visits  Used 1 2 3 4 5 6 1 2 3      Remaining  9 8 7 6 5 4 9 8 7           Precautions: Hx of stroke, HTN, orthostatic hypotension, T2DM, " "fall risk       Manuals 02/07 02/14 02/19 02/26                                    Neuro Re-Ed         Pt education  Pt education on FGA, mCTSIB, TUG, and their relation to fall risk        HIGT Solo  1.0 mph   0% incline   4# AW on R   X 8 min      Solo   1.4 mph   0% incline   X 10 min     Neural priming Solo  1.2 mph   0% incline   4# AW on R   X 4 min     X 5 min without AW     X 9 min total     Neural priming  Solo  1.4 mph   0% incline   X 10 min     Neural priming     STS  No UE with blue med ball  1 x 10     With pink med ball   1 x 10  With pink med ball   2 x 10  With pink med ball   2 x 10     Hurdles    Solo  1/4 track   4# AW on R   (6) 12\" hurdles   Fwd x 2 laps   Solo  1/4 track   5# AW on R   (6) 12\" hurdles   Fwd x 2 laps    Blazepods  Solo   1/4 track   4 pods   (6) scattered pool noodles   5# AW on R   2 min rounds  Round 1: 17 hits   Round 2: 16 hits    Solo  1/4 track   4 pods   5# AW on R   Fwd/bwd walking   1:30 min rounds   Round 1: 13 hits   Round 2: 14 hits       Step-ups Solo  6\" step 4# AW on R   Fwd x 10 each leg    Solo  8\" step   5# AW on R   Lat x 10 each leg     Dynamic Balance  Solo  1/4 track   (Small/med/large) river rocks)   Fwd x 2 laps    Solo  1/4 track   4# AW on R   Bwd walking   X 2 laps     Solo  1/4 track   (2) NBOS foam beams   Tandem walking   X 1 lap    Solo  1/4 track   Tandem walking on firm   X 1 lap  Solo  1/4 track   5# AW on R  Tandem walking on firm   X 2 laps     Resisted walking    Solo  1/4 track   4# AW on R   Lateral stepping with 5 spots on floor for visual cue  X 2 laps  Solo  1/4 track   5# AW on R   Purple TB  Lateral stepping with 5 spots on floor for visual cue  X 3 laps     Solo  1/4 track   5# AW on R   Purple TB  Fwd/bwd walking   5 spots on floor  \"Suicides\"  X 2 rounds       Agility ladder        Stair navigation   Solo  1/4 track   5 # AW on R   Whole staircase under track   Reciprocal pattern   1 UE  X 4 laps  Solo  1/4 track   4 # AW on R "   Whole staircase under track   Reciprocal pattern   1 UE  X 4 laps      Ther Ex        Pt education  Pt education on 6MWT, 5xSTS, MMT                                                                Ther Activity                        Gait Training                        Modalities

## 2025-02-28 ENCOUNTER — EVALUATION (OUTPATIENT)
Dept: OCCUPATIONAL THERAPY | Facility: CLINIC | Age: 60
End: 2025-02-28
Payer: MEDICARE

## 2025-02-28 ENCOUNTER — EVALUATION (OUTPATIENT)
Dept: PHYSICAL THERAPY | Facility: CLINIC | Age: 60
End: 2025-02-28
Payer: MEDICARE

## 2025-02-28 ENCOUNTER — PATIENT OUTREACH (OUTPATIENT)
Dept: INTERNAL MEDICINE CLINIC | Facility: CLINIC | Age: 60
End: 2025-02-28

## 2025-02-28 DIAGNOSIS — Z86.73 HISTORY OF CVA (CEREBROVASCULAR ACCIDENT): Primary | ICD-10-CM

## 2025-02-28 DIAGNOSIS — R29.898 WEAKNESS OF RIGHT ARM: ICD-10-CM

## 2025-02-28 PROCEDURE — 97110 THERAPEUTIC EXERCISES: CPT

## 2025-02-28 PROCEDURE — 97112 NEUROMUSCULAR REEDUCATION: CPT

## 2025-02-28 NOTE — PROGRESS NOTES
OCCUPATIONAL THERAPY PROGRESS NOTE:     2/28/25  Tommie Taylor  1965  [id number]  [facility]*   Diagnosis ICD-10-CM Associated Orders   1. History of CVA (cerebrovascular accident)  Z86.73               Assessment/Plan    Skilled Analysis:  Tommie Taylor is a 59 y.o. male referred to Occupational Therapy s/p History of CVA (cerebrovascular accident) [Z86.73].   Pt participated in skilled OT progress note and following formalized testing as well as clinical observation, Pt presents with the following areas of deficit:  RUE weakness, tone, decreased ability to complete ADLs, ROM, strength, and education. Therapist completing PROM this date with improvements in movement noted with to 90* FF and continued/remained ABD. Pt with continued tone noted in biceps, wrist flexors and digits. Pt with decreased pain noted this date with AROM, AAROM, and PROM. Pt reports that he has not completed much stretching or exercises at home, however has been provided with resistive sponge and red theraputty to complete squeezes at home which have been going well. Pt with noted improvements in digit ROM both extension and flexion. Continued education completed on having girlfriend complete stretching at shoulder as able for increased carry over. Pt improved this date on ability to zipper jacket with good use of stabilization and pincer grasp of of RUE to connect the piece and then pull up with R hand. Pt will benefit from skilled Occupational Therapy services 2x/week for 4 more weeks with focus on neuro re-ed, there ex, there act, and HEP.  Tommie Taylor is in agreement with POC.    Todays treatment: Pt provided with red theraputty to complete putty squeezes and rolls on table at home for increased digit ROM and strength.     Pt then seated ROM with use of R hand reaching to R side to grasp foam cylinder and then reach anterior to place onto wooden dowels. Pt requiring to stand when reaching to place due to  decreased elbow ROM, with verbal cues to ext elbow when reaching as much as able. Pt completing 3 dowels and then using colored dowels for smaller grasp to place on last 3 left. Once all on, then Pt removing with use of L hand for assist with FF reaching and stability of R arm due to fatigue.     POC expires Unit limit Auth Expiration date PT/OT/ST + Visit Limit?   3/27/25 N/A 12/31/25 BOMN                           Visit/Unit Tracking  AUTH Status:  Date 1/30 1/31 2/7 2/14 2/19 2/26 2/28 PN      BOMN Used 1 2 3 4 5 6 7       Remaining  7 6 5 4 3 2 1           Duration in weeks: 8 weeks  Plan of care beginning date: 1/30/25  Plan of care expiration date: 3/27/25    Re-eval: 3/27/25    Precautions: CVA, HTN, high BP, Orthostatic              GOALS    Short Term Goals (4 weeks)  Strength/Endurance  - Pt will demo with G tolerance to supine, and seated exercise x 10 minutes with minimal rest breaks required for increased engagement in life roles and weekly exercise regimen PROGRESSING   - Pt will demo with G understanding of HEP to improve functional progression towards goals in POC and for improved functional use of RUE PROGRESSING ; uses resistive sponge daily    AROM/PROM  - Pt will demo with decreased  RUE  shoulder sublux to trace for increased AROM for improved ADL and IADL engagement PROGRESSING   - Pt will be able to perform 60* degrees shoulder flexion of proximal RUE to demo increased strength and ROM of affected UE for improved ADLs/IADLs GOAL MET  - Pt will be able to perform near full elbow flexion of distal RUE to demo increased strength and ROM of affected UE for improved ADLs/IADLs PROGRESSING   - Pt will demo decreased hypertonicity of Modified Mason Scale (MAS) of RUE to 1 at biceps for improved alternate motor patterns, grasp/release, and termination on command for improved dressing/hygiene PROGRESSING   - Pt will demo good carryover of clinic and home tone reduction strategies for improved AROM  initiation with functional reach with ADL function PROGRESSING; not completing stretching at home    FMC/GMC  - Pt will demo improvement with opening and closing a zipper on Neuro QOL form to with some difficulty demo improved coordination and precision for FM tasks and ADLs GOAL MET  - Pt will increase RUE to independent stabilizer (dependent stabilizer, independent stabilizer, gross assist, semi-functional, functional, fully functional) assist with for ADL/IADL and tabletop tasks for improved functional performance of life roles and salient tasks GOAL MET  - Pt will demo G comprehension of adaptive equipment (long handled reacher/sponge/shoehorn, toileting aid ) use in clinic to improve independence in ADL management in home environment    Modalities  - Pt will tolerate BIONESS/NMES/IASTM for improved motor and sensory performance for overall improved strength, tone reduction, and sensation to inc safety and engagement with ADL/IADL function PROGRESSING   - Pt/family will demo G understanding and carryover of use of home NMES unit as prescribed to inc carryover of ROM and strengthening strategies of R UE PROGRESSING     Long Term Goals (8 weeks)  Strength/Endurance  - Pt will increase ability to complete hand  strength with dynamometer testing by 1-2 lbs through motor tasks to improve RUE functional .  - Pt will demo with G carryover of HEP to improve functional progression towards goals in POC and for improved functional use of RUE     AROM/PROM  - Pt will demo with decreased  RUE  shoulder sublux to no for increased AROM for improved ADL and IADL engagement  - Pt will be able to perform 90* degrees shoulder flexion of proximal RUE to demo increased strength and ROM of affected UE for improved ADLs/IADLs  - Pt will be able to perform full elbow flexion of distal RUE to demo increased strength and ROM of affected UE for improved ADLs/IADLs    FMC/GMC  - Pt will demo improvement with opening and closing a  "zipper on Neuro QOL form to with little difficulty demo improved coordination and precision for FM tasks and ADLs  - Pt will increase RUE to gross assist (dependent stabilizer, independent stabilizer, gross assist, semi-functional, functional, fully functional) assist with for ADL/IADL and tabletop tasks for improved functional performance of life roles and salient tasks     Modalities  - Pt will tolerate BIONESS/NMES/IASTM for improved motor and sensory performance for overall improved strength, tone reduction, and sensation to inc safety and engagement with ADL/IADL function  - Pt/family will demo G understanding and carryover of use of home NMES unit as prescribed to inc carryover of ROM and strengthening strategies of R UE      Subjective    PATIENT GOAL: \"I just want to get it to move again. \"    Patient-Specific Functional Scale   Task is scored 0 (unable to perform activity) to 10 (ability to perform activity independently)    Activity Date: Date:   ADLs- dressing     2.        3.        4.            HISTORY OF PRESENT ILLNESS:     Pt is a 59 y.o. male who was referred to Occupational Therapy s/p History of CVA (cerebrovascular accident) [Z86.73]. Pt reports to have had a stroke back in October. Pt lives with his girlfriend and he was home when he started to not feel right and then fell over. Pt reports that his girlfriend helps with ADLs mainly getting shirt and jackets on with his arm. He states that he is not sure how long he was in the hospital for and does not recall ever getting therapy for his UE. Per chart review, Pt had been reciving home health therapy for PT, OT, and SLP however per Pt choice, stopping therapy. Pt had been educated on AD in past sessions with Home health as well as increased movement when in therapy. Pt was not working prior to stroke and reports to not do much at home. When asked if Pt completes stretching at home for his RUE, pt denies and states that he \"just lets it go\". Pt " "reports that his girlfriends father assists in driving Pt to his appts as able.     Per chart review on 10/14/25, \"Tommie Taylor is a 59 y.o. male patient who originally presented to the hospital on 10/14/2024 due to right upper and lower extremity weakness.  On this admission, patient was found to have acute CVA.  MRI revealed left corona radiata stroke.  Neurologist was on board.  TNK was administered in the ICU.  With hypertensive emergency, patient was also noted nicardipine drip.  Patient eventually was placed on dual antiplatelet therapy for total of 21 days, then continue aspirin monotherapy indefinitely as per neurology.  Continue atorvastatin.  Recommended Zio patch in the outpatient to evaluate for any possible arrhythmia, for which PCP may facilitate this.  As per neurologist, patient will follow-up with them in the office in 6 to 8 weeks.  Patient's hospital stay was complicated with him developing acute kidney injury likely secondary to ATN versus TMA in the setting of uncontrolled blood pressure and complicated with contrast associated nephropathy.  Nephrologist was on board.  This past few days, patient's serum creatinine improving slowly.  Nephrologist was okay with the discharge and the patient today.  Patient's BMP should be monitored closely  In the rehab facility and in the outpatient.  Patient's condition improved, but still had significant right sided weakness/paresis, and agreed with acute rehabilitation facility placement and discharge today.\"    PMH:   Past Medical History:   Diagnosis Date    ADHD, adult residual type     Anxiety     Anxiety     Bipolar 1 disorder (HCC)     Cataract     Left eye    Chronic kidney disease     Colon polyp     CPAP (continuous positive airway pressure) dependence     Depression     Depression     Diabetes mellitus (HCC)     blood sugar 167 on admission    Equinus deformity of foot     GERD (gastroesophageal reflux disease)     Homicidal ideations     " Hyperlipidemia     Hypertension     Microalbuminuria     Morbid obesity (HCC)     Neuropathy     Shortness of breath     Sleep apnea     CPAP at bedtime    Sleep apnea     Stroke (HCC)     Suicidal intent          Pain Levels   Resting:  3- in L hand    With Activity:  4- in L hand.      Objective    Impairment Observations:            CATALINO Lopez           UPPER EXTREMITY FUNCTION   Impaired Intact Dominant Hand: R     /PINCH STRENGTH             Dynamometer    - Gross Grasp unable 65 lbs low   Pinch Meter     - Pincer unable 15 lbs low    - Tripod unable 15 lbs low    - Lateral unable 9 lbs low     AROM (Seated)             Scapula   - Retraction/Protraction  - Elevation/Depression  - Upward/Downward rotation      Shoulder FF 10*  WFL  Compensation in abduction- improved   Shoulder Ext 20  WFL   Remained   Shoulder internal rotation       Shoulder external rotation       Shoulder Abd 40*  WFL   Remained   Shoulder Add Near full  WFL      Horizontal Abd        Horizontal Add        Elbow Flex 60  WFL   Remained   Elbow Ext Near full  WFL   Remained- tone in elbow   Pronation Full  WFL   Remained   Supination 3/4  WFL   Improved- body comp   Wrist Flex trace  WFL   Remained   Wrist Ext Neutral  WFL   Improved   Digit Flex 1/2  WFL   Improved   Digit Ex 3/4  WFL   Improved   Composite Grasp   WFL      Hook Grasp   WFL      Subluxation  Anterior 1 FB         AROM (Supine)             Shoulder FF 90*  WFL   Improved   Shoulder Ext  WFL      Shoulder ABD 85*   WFL   Improved    Shoulder ADD   WFL      Horizontal ABD        Horizontal ADD        Elbow Flex 1/2  WFL      Elbow Ext Near full  WFL      Pronation Resting position  WFL      Supination Neutral  WFL      Wrist Flex trace  WFL     Wrist Ext Neutral- resting  WFL     Digit Flex 1/2  WFL  Improved    Digit Ext 3/4 WFL  Improved      PROM (supine position)           Shoulder *  WFL  Tone improved   Shoulder Ext 40*  WFL  Remained   Shoulder ABD 90*   WFL   Remained- slight pain at end range   Shoulder ADD 1/2  WFL      Horizontal ABD      Horizontal ADD        Elbow Flex 3/4  WFL   Tone- pain noted   Elbow Ext 3/4  WFL   Tone   Wrist Flex 1/2  WFL      Wrist Ext 1/2  WFL      Digit Flex   WFL      Digit Ext  WFL     Supination  WFL     Pronation   WFL        MMT             Shoulder FF 2+/5 5/5    Shoulder Ext 2+/5 5/5    Shoulder Abduction 2+/5 5/5    Shoulder Adduction 2+/5 5/5    Elbow Flex 2+/5 5/5    Elbow Ext 2+/5 5/5    Wrist Flex 2+/5 5/5    Wrist Ext 2+/5 5/5    Gross Grasp 2/5 5/5      SENSATION      Monofilament Testing  Normal: 2.83 mm    Diminished light touch: 3.61 mm    Diminished protective sensation: 4.31 mm    Loss of protective sensation: 4.56    Complete loss of sensation / deep pressure: 6.65      Sharp / Dull       Proprioception      Hot/Cold Temp      Stereognosis         COORDINATION      Opposition Unable  WFL    Finger to Nose unable WFL    Rapid Alternating Movement unable WFL    9 Hole Peg Test unable   DNT   Fxnl Dexterity Test unable    DNT     TONE: MODIFIED VANNESSA SCALE      No increase in muscle tone (0)      Slight Increase in muscle tone with catch and release or min resist at end range (1)      Slight Increase in muscle tone with catch and release, followed by min resistance through remainder of range (1+) Triceps, wrist flexors  Remained   Increased muscle tone through full range, able to be moved easily (2)      Considerable increase in tone, difficult to move (3)      Rigid in Flexion/Extension (4)                ADL Simulation  Donning t-shirt/jacket-   26 seconds with no assist - unable to zipper        Neuro QOL  Upper Extremity Function (Fine motor, ADL):     Are you able to turn a key in a lock? With some difficulty  Are you able to brush your teeth? With some difficulty  Are you able to make a phone call using a touch tone or key-pad? With some difficulty  Are you able to  coins from a table top? With some  difficulty  Are you are able to write with a pen or pencil With much difficulty  Are you able to open and close a zipper? With much difficulty  Are you able to wash and dry your body? With much difficulty  Are you able to shampoo your hair? Unable to do  Are you able to open previously opened jars? With much difficulty  Are you able to hold a plate full of food? With much difficulty  Are you able to pull on trousers? With some difficulty  Are you able to button your shirt? With much difficulty  Are you able to trim your fingernails? With much difficulty  Are you able to cut your toe nails?   Are you able to bend down and  clothing from the floor? With some difficulty  How much DIFFICULTY do you currently have using a spoon to eat a meal? Some difficulty  How much DIFFICULTY do you currently have putting on a pullover shirt? A lot of difficulty  How much DIFFICULTY do you currently have taking off a pullover shirt? A lot of difficulty  How much DIFFICULTY do you currently have removing wrappings from small objects? A lot of difficulty  How much DIFFUCULTY do you currently have opening medications or vitamin containers (e.g. childproof containers, small bottles)? A lot of difficulty        OTHER PLANNED THERAPY INTERVENTIONS:   Supine, seated, and in stance neuro re-ed  Task-Oriented Training  Tricep AG  NMES/FES  Cryotherapy for tone reduction  Hot Packs for pain  TENS for pain  FMC/prehension  GMC  Proximal to distal teaming  Timed Trials  Manual tx  IASTM  Hand to target  Sensory re-ed (Cutaneous/Proprioceptive)  Seated functional reach: crossing midline  Supine place and hold  WBearing strategies   Closed chain activities  Open chain activities  Mirror Therapy  HEP  Orthotic Development

## 2025-02-28 NOTE — PROGRESS NOTES
Progress Note     Today's date: 2025  Patient name: Tommie Taylor  : 1965  MRN: [id number]  Referring provider: Harshad Romo*  Dx:   Encounter Diagnosis     ICD-10-CM    1. History of CVA (cerebrovascular accident)  Z86.73       2. Weakness of right arm  R29.898           Start Time: 1300  Stop Time: 1345  Total time in clinic (min): 45 minutes    Assessment: Pt has attended 4 physical therapy sessions since last progress update and is demonstrating progress towards goals.  Pt has met 1/3 STG and / LTG at this time. Pt did not meet MMT goal for R hip flexion this date, but is progressing with strength in functional capacities. Pt also progressing with ability to perform stair navigation with reciprocal pattern with no UE on ascent, but requires 1 UE for safety on descent. Pt has improved their 6mwt distance from 950 feet to 1200 ft indicating improved aerobic capacity and activity tolerance. Pts gait speed has improved from 0.93 m/s to 1.43 m/s indicating pt is a comunity Ambulator and at a decreased risk for falls based on their speed being > 1 m/s. Pts TUG time has improved from 8.50 s to 7.57 s indicating pt is at a decreased risk for falls based on this test as socres > 13.5 s are considered at risk. pts FGA score has improved from 23/30 to 27/30 reducing pts fall risk as scores < 23 are considered at risk for falls and indicating improved dynamic balance. Pts 5x STS tests has maintained from 9.75 s to 9.34s indicating improved LE strength and power. Due to these improvements and the benefits related to neuroplasticity post-CVA, pt would benefit from going on a skilled maintenance program for 2x/wk for 4 weeks.  At this time, plan to transition to maintenance program.  Due to pt's inability to participate in safe independent exercise or ability to join traditional gym it would not be safe for pt to complete a home exercise program that would appropriately address pts deficits in a  safe manner in the home. maintenance programming would be an important opportunity to provide them with the guidance and equipment necessary to facilitate a safe and effective environment for exercise so that they are able to maintain their current level of function with a reduced risk for falls. Harness system and safety equipment during therapy can help provide adequate challenge to maintain current balance and endurance levels. throughout therapy pt has utilized the SOLO harness on all visits to address the pts balance impairments in a safe manner. They require the skilled supervision and facilitation from the physical therapist to effectively exercise with safe and correct form and decrease risk of falls. For these reasons, pt would benefit from skilled physical therapy maintenance programming to continue to maintain their current level of function and reduced risk for falls.    NEW SKILLED MAINTENANCE GOALS (2/28/25):  - Patient will maintain level of endurance and aerobic capacity with a 6MWT between 1,000-1,200 ft within 10 visits   - Patient will maintain level of LE strength and power with a 5xSTS between 10-12s without UE support within 10 visits   - Patient will maintain decreased fall risk with a gait speed between 1.0-1.2 m/s within 10 visits.   - Patient will maintain level of dynamic balance with an FGA score between 25-27/30 within 10 visits.   - Patient will maintain level of static balance with ability to perform mCTSIB in all conditions for 30 sec within 10 visits.     STG GOALS (2/7/25):  - Patient will improve R hip flexion MMT to at least 4/5 for improved LE strength within 4 weeks.  NOT MET   - Patient will be able to improve FGA by 4 points or greater for improved dynamic balance and decreased fall risk within 4 weeks. MET  - Patient will be able to negotiate stairs with reciprocal pattern and no UE support within 4 weeks for improved balance. Progressing (able on ascent, not on descent)      Goals  STG:  - Patient will be able to complete a 6MWT demonstrating significant improvements in endurance and aerobic capcity within 4 weeks. MET  - Patient will be independent with initial HEP within 4 weeks to demonstrate improve exercise compliance at home. MET  - Patient will improve TUG time without AD by 4 sec or more within 4 weeks to demonstrate decreased fall risk. MET  - Patient will improve FGA score by 4 points or greater within 4 weeks demonstrating improved dynamic balance and decreased fall risk. MET    LTG:  - Patient will improve 6MWT by 400 ft demonstrating significant improvements in endurance and aerobic capcity within 8 weeks. MET  - Patient will demonstrated improved LE strength and power with an improved 5xSTS time of 10s or less without UE within 8 weeks. MET  - Patient will perform TUG in 13 sec or less without AD within 8 weeks to demonstrate decreased fall risk. MET  - Patient will improve FGA to 22/30 or greater indicating they are no longer at higher risk for falls within 8 weeks. MET  - Patient will be independent with progressed HEP within 8 weeks. MET  - Patient will improve gait speed to 1.0 m/s indicating they are no longer at high risk for falls within 8 weeks.  - Patient will be able to negotiate stairs with a reciprocal gait pattern with use of handrail within 8 weeks. MET    Plan  Patient would benefit from: skilled maintenance physical therapy   Referral necessary: Yes    Planned therapy interventions: ADL training, balance, balance/weight bearing training, body mechanics training, coordination, flexibility, functional ROM exercises, gait training, graded activity, graded exercise, home exercise program, transfer training, therapeutic training, therapeutic exercise, therapeutic activities, stretching, strengthening, sensory integrative techniques, patient/caregiver education, neuromuscular re-education and motor coordination training    Frequency: 2x week  Duration in  weeks: 4  Plan of Care beginning date: 02/28/2025  Plan of Care expiration date: 03/28/2025  Treatment plan discussed with: patient     Subjective: Therapy has been helping a lot with strengthening my leg and arm, I'm cooking/zipping my coat     Patient pain: 0/10  Patient goals: maintain where I'm at, get leg stronger     Objective: See treatment diary below      Balance Test 01/08 02/07 02/28   6 Minute Walk Test (ft): 300 ft, 2:10 left, no AD   HR: 75 bpm   O2: 99% 950 ft, no AD   HR: 79 bpm   O2: 99%  1200 ft, no AD  HR: 85 bpm  O2: 98%   Gait Speed (ft/s): 0.81 m/s 0.93 m/s  1.43 m/s    5x Sit To Stand (s): 13s, No UE  9.75s, no UE  9.34s, no UE   TUG 16.57s 8.50s 7.57s    FGA 15/30 23/30 27/30       MCTSIB Number of Seconds (01/08) 02/07 02/28   Feet Together, Eyes Open 30 sec  30 sec 30 sec    Feet Together, Eyes Closed 30 sec 30 sec 30 sec   Feet Together, Eyes Open Foam 30 sec, mild sway 30 sec  30 sec   Feet Together, Eyes Closed Foam 30 sec, mod sway 30 sec, mild sway  30 sec      Manual Muscle Testing - Hip Right Left 02/07 02/28   Flexion 3/5 3+/5 R: 3+/5  L: 4/5 R; 3+/5  L: 4/5     Manual Muscle Testing - Knee Right Left  02/07 02/28   Flexion 4/5 5/5 R: 4/5  L: 5/5 R: 5/5  L: 5/5   Extension 4/5 5/5 R: 5/5  L: 5/5 R: 5/5  L: 5/5     Manual Muscle Testing - Ankle Right Left  02/07 02/28   Doriflexion 4/5 5/5 R: 4+/5  L: 5/5 R: 5/5  L: 5/5              Long Term Goal Expiration Date: (03/28/2025)  POC Expiration Date: (03/28/2025)    Visit count: 4 of 10; PN due 03/05/2025    POC expires Unit limit Auth Expiration date PT/OT/ST + Visit Limit?   03/05/25 6 PT/OT 12/31/25 $2410 CAP/BOMN   3/28/25 6 PT/OT 12/31/25 $2410 CAP/BOMN                     Visit/Unit Tracking  AUTH Status:  Date 01/08 01/13 01/20 01/31 02/03 02/07  PN 2/14 2/19 2/26 2/28  PN    Every 10 visits  Used 1 2 3 4 5 6 1 2 3 4     Remaining  9 8 7 6 5 4 9 8 7 6          Precautions: Hx of stroke, HTN, orthostatic hypotension, T2DM, fall  "risk       Manuals 02/07 02/14 02/19 02/26 2/28                                   Neuro Re-Ed         Pt education  Pt education on FGA, mCTSIB, TUG, and their relation to fall risk     Pt education on FGA, mCTSIB, TUG, and their relation to fall risk    HIGT Solo  1.0 mph   0% incline   4# AW on R   X 8 min      Solo   1.4 mph   0% incline   X 10 min     Neural priming Solo  1.2 mph   0% incline   4# AW on R   X 4 min     X 5 min without AW     X 9 min total     Neural priming  Solo  1.4 mph   0% incline   X 10 min     Neural priming     STS  No UE with blue med ball  1 x 10     With pink med ball   1 x 10  With pink med ball   2 x 10  With pink med ball   2 x 10     Hurdles    Solo  1/4 track   4# AW on R   (6) 12\" hurdles   Fwd x 2 laps   Solo  1/4 track   5# AW on R   (6) 12\" hurdles   Fwd x 2 laps    Blazepods  Solo   1/4 track   4 pods   (6) scattered pool noodles   5# AW on R   2 min rounds  Round 1: 17 hits   Round 2: 16 hits    Solo  1/4 track   4 pods   5# AW on R   Fwd/bwd walking   1:30 min rounds   Round 1: 13 hits   Round 2: 14 hits       Step-ups Solo  6\" step 4# AW on R   Fwd x 10 each leg    Solo  8\" step   5# AW on R   Lat x 10 each leg  Solo  8\" step   5# AW on R   Lat x 10 each leg    Dynamic Balance  Solo  1/4 track   (Small/med/large) river rocks)   Fwd x 2 laps    Solo  1/4 track   4# AW on R   Bwd walking   X 2 laps     Solo  1/4 track   (2) NBOS foam beams   Tandem walking   X 1 lap    Solo  1/4 track   Tandem walking on firm   X 1 lap  Solo  1/4 track   5# AW on R  Tandem walking on firm   X 2 laps  Solo  1/4 track   Bolster with 4# AW  Fwd/bwd walking   X 2 laps    Resisted walking    Solo  1/4 track   4# AW on R   Lateral stepping with 5 spots on floor for visual cue  X 2 laps  Solo  1/4 track   5# AW on R   Purple TB  Lateral stepping with 5 spots on floor for visual cue  X 3 laps     Solo  1/4 track   5# AW on R   Purple TB  Fwd/bwd walking   5 spots on floor  \"Suicides\"  X 2 rounds    " Hallway (100ft)   Purple TB   5# AW on R   X 2 laps     Solo  1/2 track   Purple TB   5# AW on R   X 2 laps      Agility ladder        Stair navigation   Solo  1/4 track   5 # AW on R   Whole staircase under track   Reciprocal pattern   1 UE  X 4 laps  Solo  1/4 track   4 # AW on R   Whole staircase under track   Reciprocal pattern   1 UE  X 4 laps      Ther Ex        Pt education  Pt education on 6MWT, 5xSTS, MMT     Pt education on 6MWT, 5xSTS, MMT                                                            Ther Activity                        Gait Training                        Modalities

## 2025-02-28 NOTE — PROGRESS NOTES
Notes:   02/28/2025    Re: [employer]:     CMOC received referral from RN Lakisha Rodriguez to assist patient with [employer] application.     CMOC spoke to patients chanell Sevilla [phone] (we have communication consent on file). [place] states patient needs the transportation.     Pt has a schedule appointment at our clinic on 03/10 at 1: PM and JAMAAL Garcia will meet with pt to get a signature for the application.     [employer] application has been pre-completed and e-mail to JAMAAL Garcia.     Pt's chanell is aware I will not be available for 2 weeks.  Marie has JAMAAL Garcia tel # if pt has any urgent issues.

## 2025-03-03 ENCOUNTER — PATIENT OUTREACH (OUTPATIENT)
Dept: INTERNAL MEDICINE CLINIC | Facility: CLINIC | Age: 60
End: 2025-03-03

## 2025-03-03 ENCOUNTER — RA CDI HCC (OUTPATIENT)
Dept: OTHER | Facility: HOSPITAL | Age: 60
End: 2025-03-03

## 2025-03-03 ENCOUNTER — TELEPHONE (OUTPATIENT)
Dept: INTERNAL MEDICINE CLINIC | Facility: CLINIC | Age: 60
End: 2025-03-03

## 2025-03-03 NOTE — PROGRESS NOTES
Outpatient Care Management Note:    Re:  CMOC added    Received EPIC chat message on 2/28/25 from [facility] requesting to be added. CMOC added to support and services in complex care management program.     Tasks are in careplan for CMOC to assist with [employer] transportation.     Received inbasket from [facility] on 2/28/25 that CMOC has reached out to patient and significant other Marie. Patient has appointment at PCP office on 3/10/25 and  will assist with getting [employer] application signed.     This RN CM has handed patient off to in office RN CM.

## 2025-03-03 NOTE — PROGRESS NOTES
Pt due for outreach. Outreach to patient. Unable to reach. Chart notes reviewed. A1C is down to 6.7. Pt has declined CGM in the past.     Discussed case with JAMAAL MEDRANO. JAMAAL CM will meet with patient at PCP. Conference call with patient and RN CM. CMOC is assisting with [employer] application.

## 2025-03-03 NOTE — PROGRESS NOTES
HCC coding opportunities          Chart Reviewed number of suggestions sent to Provider: 3     Patients Insurance     Medicare Insurance: Medicare        [id number]  E11.36  E11.42

## 2025-03-05 ENCOUNTER — OFFICE VISIT (OUTPATIENT)
Dept: OCCUPATIONAL THERAPY | Facility: CLINIC | Age: 60
End: 2025-03-05
Payer: MEDICARE

## 2025-03-05 DIAGNOSIS — Z86.73 HISTORY OF CVA (CEREBROVASCULAR ACCIDENT): Primary | ICD-10-CM

## 2025-03-05 DIAGNOSIS — R29.898 WEAKNESS OF RIGHT ARM: ICD-10-CM

## 2025-03-05 PROCEDURE — 97150 GROUP THERAPEUTIC PROCEDURES: CPT

## 2025-03-05 PROCEDURE — 97112 NEUROMUSCULAR REEDUCATION: CPT

## 2025-03-05 NOTE — PROGRESS NOTES
"Occupational Therapy Daily Note:    Today's date: 3/5/2025  Patient name: Tommie Taylor  : 1965  MRN: [id number]  Referring provider: Harshad Romo*  Dx:   Encounter Diagnoses   Name Primary?    History of CVA (cerebrovascular accident) Yes    Weakness of right arm        Start Time: 1415  Stop Time: 1500  Total time in clinic (min): 45 minutes    POC expires Unit limit Auth Expiration date PT/OT/ST + Visit Limit?   3/27/25 N/A 25 BOMN                           Visit/Unit Tracking  AUTH Status:  Date  2/ PN      BOMN Used 1 2 3 4 5 6 7       Remaining  7 6 5 4 3 2 1           Duration in weeks: 8 weeks  Plan of care beginning date: 25  Plan of care expiration date: 3/27/25    Re-eval: 3/27/25    Precautions: CVA, HTN, high BP, Orthostatic       Subjective: \"I'll let you know if I have pain\".     Objective: See treatment below.     Neuro Re-ed:      Pt tolerated eelctrical stimulation to triceps and infra/supra spinatus while positioned supine on mat utilizing 4# tbar focusing on proximal strength/endurance,  strength improving fxl reach/gripping abilities increasing indep in home mngt and self care tasks. Pt completed chest press with 4 sec hold, fwd/bwd rows, 3x10 achieving near full range w/o report of pain.  Loss of  x2.       Pt practiced FM skills on ADL vest to zip zipper, buckle/unbuckle large and small ambreen and remove velcro.  Pt noted with difficulty to sustain pinch pattern using right hand- pt noted to stabilize vest with roght hand and complete FM task with left hand, pt able to zip zipper using right hand pad pinch.     Session completed w/large wooden bead task, grasping and threading large bead into shoelace using right hand and stabilizing with left.       Assessment: Tolerated treatment well. Pt demo-F symmetry b/t BUE's during therex.  Seated pt continues to utilize compensatory techniques impeding AROM during fxl reaching " tasks.  Patient would benefit from continued skilled OT.    Plan: Continued skilled OT per POC    3/5:  215-230-GP  230-300-1:1

## 2025-03-06 NOTE — PROGRESS NOTES
Skilled Maintenance Note     Today's date: 3/7/2025  Patient name: Tommie Taylor  : 1965  MRN: [id number]  Referring provider: Harshad Romo*  Dx:   Encounter Diagnosis     ICD-10-CM    1. History of CVA (cerebrovascular accident)  Z86.73       2. Weakness of right arm  R29.898           Start Time: 1145  Stop Time: 1230  Total time in clinic (min): 45 minutes    Subjective: Feeling better, been cooking more       Objective: See treatment diary below      Assessment: Pt tolerated the treatment well. Pt performed walking on the treadmill to promote cardiovascular endurance/neural priming for the session. Pt was challenged with increased AW to 7.5# with excellent foot clearance this session. Pt progressing with hurdles with ability to perform with reciprocal pattern this session. Pt had most difficulty with walking backwards requiring VC to step past each foot. Pt demonstrated fatigue post-session. Pt would benefit from further skilled PT sessions to address deficits in endurance, strength, balance, activity tolerance, and overall safety needed to maximize the pt's function and quality of life.       Plan: Continue per plan of care.  Progress treatment as tolerated.       Long Term Goal Expiration Date: (2025)  POC Expiration Date: (2025)    Visit count: 4 of 10; PN due 2025    POC expires Unit limit Auth Expiration date PT/OT/ST + Visit Limit?   25 6 PT/OT 25 $2410 CAP/BOMN   3/28/25 6 PT/OT 25 $2410 CAP/BOMN                     Visit/Unit Tracking  AUTH Status:  Date   PN   PN 3/7   Every 10 visits  Used 1 2 3 4 5 6 1 2 3 4 1    Remaining  9 8 7 6 5 4 9 8 7 6 9         Precautions: Hx of stroke, HTN, orthostatic hypotension, T2DM, fall risk       Manuals                                    Neuro Re-Ed         Pt education      Pt education on FGA, mCTSIB, TUG, and their relation  "to fall risk    HIGT Solo  1.3 mph   0% incline   7.5# AW on R  X 8 min     Neural priming  Solo   1.4 mph   0% incline   X 10 min     Neural priming Solo  1.2 mph   0% incline   4# AW on R   X 4 min     X 5 min without AW     X 9 min total     Neural priming  Solo  1.4 mph   0% incline   X 10 min     Neural priming     STS With Bolster with 9# AW hooked on   2 x 10  No UE with blue med ball  1 x 10     With pink med ball   1 x 10  With pink med ball   2 x 10  With pink med ball   2 x 10     Hurdles  Solo  1/4 track   7.5# AW on R   (6) pool noodles   Fwd x 4 laps (reciprocal)   Lat x 3 laps   Solo  1/4 track   4# AW on R   (6) 12\" hurdles   Fwd x 2 laps   Solo  1/4 track   5# AW on R   (6) 12\" hurdles   Fwd x 2 laps    Blazepods  Solo   1/4 track   4 pods   (6) scattered pool noodles   5# AW on R   2 min rounds  Round 1: 17 hits   Round 2: 16 hits    Solo  1/4 track   4 pods   5# AW on R   Fwd/bwd walking   1:30 min rounds   Round 1: 13 hits   Round 2: 14 hits       Step-ups    Solo  8\" step   5# AW on R   Lat x 10 each leg  Solo  8\" step   5# AW on R   Lat x 10 each leg    Dynamic Balance Solo  1/4 track   Bolster with 4# AW  7.5# AW on R  Fwd/bwd walking   X 3 laps   VC to step past each foot  Solo  1/4 track   (Small/med/large) river rocks)   Fwd x 2 laps    Solo  1/4 track   4# AW on R   Bwd walking   X 2 laps     Solo  1/4 track   (2) NBOS foam beams   Tandem walking   X 1 lap    Solo  1/4 track   Tandem walking on firm   X 1 lap  Solo  1/4 track   5# AW on R  Tandem walking on firm   X 2 laps  Solo  1/4 track   Bolster with 4# AW  Fwd/bwd walking   X 2 laps    Resisted walking    Solo  1/4 track   4# AW on R   Lateral stepping with 5 spots on floor for visual cue  X 2 laps  Solo  1/4 track   5# AW on R   Purple TB  Lateral stepping with 5 spots on floor for visual cue  X 3 laps     Solo  1/4 track   5# AW on R   Purple TB  Fwd/bwd walking   5 spots on floor  \"Suicides\"  X 2 rounds    Hallway (100ft)   Purple " TB   5# AW on R   X 2 laps     Solo  1/2 track   Purple TB   5# AW on R   X 2 laps      Agility ladder        Stair navigation   Solo  1/4 track   5 # AW on R   Whole staircase under track   Reciprocal pattern   1 UE  X 4 laps  Solo  1/4 track   4 # AW on R   Whole staircase under track   Reciprocal pattern   1 UE  X 4 laps      Velcro cards Solo  Airex   X 10 cards   Head turns   X 5 min       Ther Ex        Pt education      Pt education on 6MWT, 5xSTS, MMT                                                            Ther Activity                        Gait Training                        Modalities

## 2025-03-07 ENCOUNTER — OFFICE VISIT (OUTPATIENT)
Dept: PHYSICAL THERAPY | Facility: CLINIC | Age: 60
End: 2025-03-07
Payer: MEDICARE

## 2025-03-07 DIAGNOSIS — Z86.73 HISTORY OF CVA (CEREBROVASCULAR ACCIDENT): Primary | ICD-10-CM

## 2025-03-07 DIAGNOSIS — R29.898 WEAKNESS OF RIGHT ARM: ICD-10-CM

## 2025-03-07 PROCEDURE — 97112 NEUROMUSCULAR REEDUCATION: CPT

## 2025-03-10 ENCOUNTER — PATIENT OUTREACH (OUTPATIENT)
Dept: INTERNAL MEDICINE CLINIC | Facility: CLINIC | Age: 60
End: 2025-03-10

## 2025-03-10 ENCOUNTER — OFFICE VISIT (OUTPATIENT)
Dept: INTERNAL MEDICINE CLINIC | Facility: CLINIC | Age: 60
End: 2025-03-10

## 2025-03-10 ENCOUNTER — PATIENT OUTREACH (OUTPATIENT)
Age: 60
End: 2025-03-10

## 2025-03-10 VITALS
HEART RATE: 77 BPM | SYSTOLIC BLOOD PRESSURE: 117 MMHG | DIASTOLIC BLOOD PRESSURE: 72 MMHG | WEIGHT: 258.2 LBS | TEMPERATURE: 97.6 F | BODY MASS INDEX: 41.67 KG/M2

## 2025-03-10 DIAGNOSIS — R29.90 STROKE-LIKE SYMPTOMS: ICD-10-CM

## 2025-03-10 DIAGNOSIS — E11.22 TYPE 2 DIABETES MELLITUS WITH STAGE 4 CHRONIC KIDNEY DISEASE, WITH LONG-TERM CURRENT USE OF INSULIN (HCC): ICD-10-CM

## 2025-03-10 DIAGNOSIS — N18.4 TYPE 2 DIABETES MELLITUS WITH STAGE 4 CHRONIC KIDNEY DISEASE, WITH LONG-TERM CURRENT USE OF INSULIN (HCC): ICD-10-CM

## 2025-03-10 DIAGNOSIS — Z79.4 TYPE 2 DIABETES MELLITUS WITH STAGE 4 CHRONIC KIDNEY DISEASE, WITH LONG-TERM CURRENT USE OF INSULIN (HCC): ICD-10-CM

## 2025-03-10 DIAGNOSIS — I10 PRIMARY HYPERTENSION: Primary | ICD-10-CM

## 2025-03-10 DIAGNOSIS — Z12.11 SCREENING FOR COLORECTAL CANCER: ICD-10-CM

## 2025-03-10 DIAGNOSIS — I63.512 CEREBROVASCULAR ACCIDENT (CVA) DUE TO OCCLUSION OF LEFT MIDDLE CEREBRAL ARTERY (HCC): ICD-10-CM

## 2025-03-10 DIAGNOSIS — Z12.12 SCREENING FOR COLORECTAL CANCER: ICD-10-CM

## 2025-03-10 PROCEDURE — 99213 OFFICE O/P EST LOW 20 MIN: CPT | Performed by: INTERNAL MEDICINE

## 2025-03-10 PROCEDURE — G2211 COMPLEX E/M VISIT ADD ON: HCPCS | Performed by: INTERNAL MEDICINE

## 2025-03-10 NOTE — PATIENT INSTRUCTIONS
Continue taking your current medications as prescribed.  Okay to not take any insulin at this time.  Please schedule a colonoscopy.  Please follow up with endocrinology - Dr. Coppola.  Follow up in 3 months.

## 2025-03-10 NOTE — PROGRESS NOTES
Pt needs discussed with JAMAAL MEDRANO . [employer] application signed by patient today in the office. JAMAAL MEDRANO will email application to [employer].     Pt has been following up with his appointments.     DESHAWN MEDRANO will follow up in two weeks.

## 2025-03-10 NOTE — PROGRESS NOTES
[facility]  INTERNAL MEDICINE OFFICE VISIT     PATIENT INFORMATION     Tommie Taylor   59 y.o. male   MRN: [id number]    ASSESSMENT/PLAN     Diagnoses and all orders for this visit:    Primary hypertension    Cerebrovascular accident (CVA) due to occlusion of left middle cerebral artery (HCC)    Type 2 diabetes mellitus with stage 4 chronic kidney disease, with long-term current use of insulin (HCC)  -     Ambulatory Referral to Endocrinology; Future    Screening for colorectal cancer  -     Ambulatory Referral to Gastroenterology; Future    Stroke-like symptoms      Stroke-Like Symptoms  -Patient had a recent admission for stroke like symptoms and dizziness.  -Know history of CVA with right sided deficits.  -Symptoms resolved during admission without acutely revealing findings.  -Doing well at this time, working with physical therapy.    Plan:  -Encouraged continued working with physical therapy.  -Encouraged good hydration for any possible orthostatic component of symptoms.  -Follow up in 3 months.    Hypertension  -BP well controlled today, 117/72.  -Currently on Nifedipine 90 mg daily, Atenolol 25 mg daily.    Plan:  -Continue current medications.  -Encourage regular exercise.    T2DM  -A1C last 6.7 from 8.6 prior.  -Not taking any diabetes medications at this time.  -At recent hospital visit, Fructosamine was 208. - per note by Dr. Coppola, concern for low sugars.  -Home glucose monitor examined in office - fasting sugars from 83 to 116, random sugars from 94 to 168.  -Of note, has lost 20 pounds since last visit with this provider in the fall.     Plan:  -Continue off diabetic medications at this time given well controlled A1C.  -Placed referral for follow up with endocrinology - Dr. Coppola.  -Encouraged healthy diet and regular exercise.  -Encouraged patient to schedule regular eye exam.  -Follow up in 3 months for diabetes.    Screening for Colorectal Cancer  -GI  "referral placed.    Schedule a follow-up appointment in 3 months.    HEALTH MAINTENANCE     Immunization History   Administered Date(s) Administered    COVID-19 PFIZER VACCINE 0.3 ML IM 03/30/2021, 04/24/2021    INFLUENZA 12/11/2014, 10/09/2015, 12/21/2016, 09/28/2017    Influenza Quadrivalent, 6-35 Months IM 12/21/2016    Influenza Recombinant Preservative Free Im 12/05/2024    Influenza, injectable, quadrivalent, preservative free 0.5 mL 11/06/2023    Influenza, recombinant, quadrivalent,injectable, preservative free 12/04/2018, 11/01/2019, 10/23/2020, 01/24/2022, 11/07/2022    Influenza, seasonal, injectable 12/11/2014, 10/09/2015, 09/28/2017    Pneumococcal Polysaccharide PPV23 12/04/2018     CHIEF COMPLAINT     Chief Complaint   Patient presents with    Follow-up      HISTORY OF PRESENT ILLNESS     Patient is a 58 y/o male with PMH including HTN, GERD, T2DM, who presents today for follow up appointment. Of note, in February, he has admitted to the hospital for stroke-like symptoms. His inpatient workup was largely unremarkable and he was discharged. No recurrence of symptoms. Started physical therapy which he is still using. Patient says that he feels \"great.\" Not currently taking insulin. Not exercising. No other concerns at this time.     REVIEW OF SYSTEMS     Review of Systems   Constitutional:  Negative for chills and fever.   HENT:  Negative for ear pain and sore throat.    Eyes:  Negative for pain and visual disturbance.   Respiratory:  Negative for cough and shortness of breath.    Cardiovascular:  Negative for chest pain and palpitations.   Gastrointestinal:  Negative for abdominal pain, diarrhea, nausea and vomiting.   Endocrine: Negative for polyuria.   Genitourinary:  Negative for dysuria and hematuria.   Musculoskeletal:  Negative for arthralgias and back pain.   Skin:  Negative for color change and rash.   Neurological:  Negative for seizures and syncope.   Psychiatric/Behavioral:  The patient is " not nervous/anxious.    All other systems reviewed and are negative.    OBJECTIVE     Vitals:    03/10/25 1311   BP: 117/72   BP Location: Left arm   Patient Position: Sitting   Cuff Size: Large   Pulse: 77   Temp: 97.6 °F (36.4 °C)   TempSrc: Temporal   Weight: 117 kg (258 lb 3.2 oz)     Physical Exam  Vitals and nursing note reviewed.   Constitutional:       General: He is not in acute distress.     Appearance: He is well-developed. He is obese.   HENT:      Head: Normocephalic and atraumatic.      Right Ear: External ear normal.      Left Ear: External ear normal.      Nose: Nose normal.      Mouth/Throat:      Mouth: Mucous membranes are moist.   Eyes:      Conjunctiva/sclera: Conjunctivae normal.   Cardiovascular:      Rate and Rhythm: Normal rate and regular rhythm.      Heart sounds: No murmur heard.  Pulmonary:      Effort: Pulmonary effort is normal. No respiratory distress.      Breath sounds: Normal breath sounds.   Abdominal:      Palpations: Abdomen is soft.      Tenderness: There is no abdominal tenderness.   Musculoskeletal:      Cervical back: Neck supple.      Right lower leg: Edema present.      Left lower leg: Edema present.      Comments: Trace B/L edema   Skin:     General: Skin is warm and dry.      Capillary Refill: Capillary refill takes less than 2 seconds.   Neurological:      General: No focal deficit present.      Mental Status: He is alert and oriented to person, place, and time.   Psychiatric:         Mood and Affect: Mood normal.       CURRENT MEDICATIONS     Current Outpatient Medications:     acetaminophen (TYLENOL) 325 mg tablet, Take 2 tablets (650 mg total) by mouth every 6 (six) hours as needed for mild pain, fever or headaches, Disp: , Rfl:     ammonium lactate (LAC-HYDRIN) 12 % cream, Apply topically as needed for dry skin, Disp: 385 g, Rfl: 0    aspirin 81 mg EC tablet, Take 1 tablet (81 mg total) by mouth daily, Disp: 30 tablet, Rfl: 2    atenolol (TENORMIN) 25 mg tablet,  Take 1 tablet (25 mg total) by mouth daily, Disp: 90 tablet, Rfl: 3    atorvastatin (LIPITOR) 20 mg tablet, Take 1 tablet (20 mg total) by mouth daily (Patient not taking: Reported on 2/12/2025), Disp: 90 tablet, Rfl: 3    Blood Glucose Monitoring Suppl (Assure 3 Meter) KIT, Use 1 each 4 (four) times a day Fine to substitute other brand if not covered by insurance (Patient not taking: Reported on 2/12/2025), Disp: 1 kit, Rfl: 2    Blood Glucose Monitoring Suppl (FreeStyle Lite) w/Device KIT, Use as instructed 4 times a day, Disp: 1 kit, Rfl: 0    calcium carbonate (TUMS) 500 mg chewable tablet, Chew 2 tablets (1,000 mg total) 2 (two) times a day as needed for indigestion or heartburn (Patient not taking: Reported on 1/6/2025), Disp: , Rfl:     Cholecalciferol (VITAMIN D3) 1,000 units tablet, Take 2 tablets (2,000 Units total) by mouth daily (Patient not taking: Reported on 2/12/2025), Disp: 180 tablet, Rfl: 0    glucose blood (FREESTYLE LITE) test strip, CHECK BLOOD SUGARS FOUR TIMES DAILY, Disp: 400 strip, Rfl: 3    insulin NPH-insulin regular (NovoLIN 70/30 ReliOn) 100 units/mL subcutaneous injection, Inject 20 Units under the skin daily before breakfast AND 10 Units in the evening. Take before meals. Use 20 units with breakfast and 10 units with dinner. (Patient not taking: Reported on 2/12/2025), Disp: 10 mL, Rfl: 6    Insulin Pen Needle 31G X 8 MM MISC, Check sugars three times a day (Patient not taking: Reported on 2/12/2025), Disp: 100 each, Rfl: 1    Lancets (freestyle) lancets, Use as instructed (Patient not taking: Reported on 2/12/2025), Disp: 300 each, Rfl: 0    magnesium gluconate (MAGONATE) 500 MG tablet, Take 1 tablet (500 mg total) by mouth daily Do not start before November 16, 2024. (Patient not taking: Reported on 1/28/2025), Disp: 30 tablet, Rfl: 0    NIFEdipine (ADALAT CC) 90 mg 24 hr tablet, Take 1 tablet (90 mg total) by mouth daily, Disp: 90 tablet, Rfl: 1    omeprazole (PriLOSEC) 20 mg  "delayed release capsule, Take 20 mg by mouth daily, Disp: , Rfl:     PAST MEDICAL & SURGICAL HISTORY     Past Medical History:   Diagnosis Date    ADHD, adult residual type     Anxiety     Anxiety     Bipolar 1 disorder (HCC)     Cataract     Left eye    Chronic kidney disease     Colon polyp     CPAP (continuous positive airway pressure) dependence     Depression     Depression     Diabetes mellitus (HCC)     blood sugar 167 on admission    Equinus deformity of foot     GERD (gastroesophageal reflux disease)     Homicidal ideations     Hyperlipidemia     Hypertension     Microalbuminuria     Morbid obesity (HCC)     Neuropathy     Shortness of breath     Sleep apnea     CPAP at bedtime    Sleep apnea     Stroke (HCC)     Suicidal intent      Past Surgical History:   Procedure Laterality Date    CATARACT EXTRACTION      CATARACT EXTRACTION      COLONOSCOPY      HAND SURGERY Right     OTHER SURGICAL HISTORY      REPAIR OF SUPERFICIAL WOUND FACE    RI ESOPHAGOGASTRODUODENOSCOPY TRANSORAL DIAGNOSTIC N/A 2018    Procedure: ESOPHAGOGASTRODUODENOSCOPY (EGD);  Surgeon: Yinka Maier MD;  Location: [facility];  Service: Gastroenterology    RI ESOPHAGOGASTRODUODENOSCOPY TRANSORAL DIAGNOSTIC N/A 2018    Procedure: EGD AND COLONOSCOPY;  Surgeon: Yinka Maier MD;  Location: [facility];  Service: Gastroenterology     SOCIAL & FAMILY HISTORY     Social History     Socioeconomic History    Marital status: /Civil Union     Spouse name: Not on file    Number of children: 1    Years of education: Not on file    Highest education level: Not on file   Occupational History    Occupation: On disability   Tobacco Use    Smoking status: Former     Current packs/day: 0.00     Types: Cigarettes     Quit date:      Years since quittin.2    Smokeless tobacco: Current     Types: Chew    Tobacco comments:     pt \"Quit after approx over 25 years ago\"   Vaping Use    Vaping status: Never Used   Substance and Sexual " "Activity    Alcohol use: Not Currently     Comment: rarely    Drug use: Not Currently     Comment: quit 20 years ago    Sexual activity: Yes     Partners: Female     Birth control/protection: None     Comment: steady girlfriend   Other Topics Concern    Not on file   Social History Narrative     from spouse, technically still  but living with other partner, partner's father, and early 9/2019, his partner's daughter and boyfriend moved in with their two children.  Then the boyfriend moved out in 9/2019.   Then partner's daughter moved out approx 7/2021.  (Pt's partner has guardianship of the grandchildren per Pt).        Children: 1 stepdaughter         Education:    Pt denies any hx of learning disabilities and reached childhood milestones on time as far as he knows.  He wore braces for equinovarus but states he did not suffer delay in walking    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 because \"I was just lazy, didn't do the work.\"    No college    Took some core classes in [employer] and worked as a volunteer  from approx [phone]             Substance Abuse History:     Pt drinks ETOH approx q 3-4 months but denies any h/o ETOH abuse.    Pt tried smoking Crack once in the past and has been smoking THC once q 2-3 months since 12y/o.     He denies other drug use, IVDA, addictions or drug abuse.         Social Drivers of Health     Financial Resource Strain: Low Risk  (1/14/2025)    Overall Financial Resource Strain (CARDIA)     Difficulty of Paying Living Expenses: Not hard at all   Food Insecurity: No Food Insecurity (2/11/2025)    Hunger Vital Sign     Worried About Running Out of Food in the Last Year: Never true     Ran Out of Food in the Last Year: Never true   Transportation Needs: No Transportation Needs (2/11/2025)    PRAPARE - Transportation     Lack of Transportation (Medical): No     Lack of Transportation (Non-Medical): No   Physical Activity: Inactive (9/14/2021) " "   Exercise Vital Sign     Days of Exercise per Week: 0 days     Minutes of Exercise per Session: 0 min   Stress: No Stress Concern Present (2021)    [employer] [place] [employer] - Occupational Stress Questionnaire     Feeling of Stress : Not at all   Social Connections: Moderately Isolated (2021)    Social Connection and Isolation Panel [NHANES]     Frequency of Communication with Friends and Family: More than three times a week     Frequency of Social Gatherings with Friends and Family: Once a week     Attends Rastafarian Services: Never     Active Member of Clubs or Organizations: No     Attends Club or Organization Meetings: Never     Marital Status: Living with partner   Intimate Partner Violence: Unknown (2025)    Nursing IPS     Feels Physically and Emotionally Safe: Not on file     Physically Hurt by Someone: Not on file     Humiliated or Emotionally Abused by Someone: Not on file     Physically Hurt by Someone: No     Hurt or Threatened by Someone: No   Housing Stability: Low Risk  (2025)    Housing Stability Vital Sign     Unable to Pay for Housing in the Last Year: No     Number of Times Moved in the Last Year: 1     Homeless in the Last Year: No     Social History     Substance and Sexual Activity   Alcohol Use Not Currently    Comment: rarely       Social History     Substance and Sexual Activity   Drug Use Not Currently    Comment: quit 20 years ago     Social History     Tobacco Use   Smoking Status Former    Current packs/day: 0.00    Types: Cigarettes    Quit date: 1985    Years since quittin.2   Smokeless Tobacco Current    Types: Chew   Tobacco Comments    pt \"Quit after approx over 25 years ago\"     Family History   Problem Relation Age of Onset    Diabetes Mother     Alcohol abuse Father     Diabetes Father     Hypertension Father     Lung cancer Father     Stroke Father     Cancer Father         lung    Alcohol abuse Sister     Depression Sister     Lymphoma " Sister     Alcohol abuse Family     Alcohol abuse Sister     Alcohol abuse Sister     Cancer Sister     Heart disease Neg Hx     Thyroid disease Neg Hx              ==  Layton Flores MD PGY3  [facility]    [address], [address]  [place] [address]  Office: [phone]  Fax: [phone]

## 2025-03-10 NOTE — PROGRESS NOTES
SW has met with pt after his PCP appointment to obtain his signatures for his [employer] application. As per Crista CMOC request.  SW has obtained same and has emailed application to [email or web address] as well as  mailed out a hard copy of pt's application to [employer] this date.     SW has reviewed the [employer] application process with pt and has informed him a face to face evaluation will be needed.    They will reach out to him to set up this free evaluation and provide a ride to it.    Pt denied any questions re same.  This was a one time out reach for NOELLE.    Noelle hs reviewed case with Arnoldo Martinez RN/CM via phone with date as well.

## 2025-03-12 ENCOUNTER — OFFICE VISIT (OUTPATIENT)
Dept: PHYSICAL THERAPY | Facility: CLINIC | Age: 60
End: 2025-03-12
Payer: MEDICARE

## 2025-03-12 ENCOUNTER — OFFICE VISIT (OUTPATIENT)
Dept: OCCUPATIONAL THERAPY | Facility: CLINIC | Age: 60
End: 2025-03-12
Payer: MEDICARE

## 2025-03-12 ENCOUNTER — TELEPHONE (OUTPATIENT)
Dept: NEUROLOGY | Facility: CLINIC | Age: 60
End: 2025-03-12

## 2025-03-12 DIAGNOSIS — R29.898 WEAKNESS OF RIGHT ARM: ICD-10-CM

## 2025-03-12 DIAGNOSIS — Z86.73 HISTORY OF CVA (CEREBROVASCULAR ACCIDENT): Primary | ICD-10-CM

## 2025-03-12 PROCEDURE — 97112 NEUROMUSCULAR REEDUCATION: CPT

## 2025-03-12 PROCEDURE — 97110 THERAPEUTIC EXERCISES: CPT

## 2025-03-12 NOTE — PROGRESS NOTES
Occupational Therapy Daily Note:    Today's date: 3/12/2025  Patient name: Tommie Taylor  : 1965  MRN: [id number]  Referring provider: Harshad Romo*  Dx:   Encounter Diagnoses   Name Primary?    History of CVA (cerebrovascular accident) Yes    Weakness of right arm                       POC expires Unit limit Auth Expiration date PT/OT/ST + Visit Limit?   3/27/25 N/A 25 BOMN                           Visit/Unit Tracking  AUTH Status:  Date  2 PN 3/5 3/12    BOMN Used 1 2 3 4 5 6 7 1 2     Remaining  7 6 5 4 3 2 1 7 6           Duration in weeks: 8 weeks  Plan of care beginning date: 25  Plan of care expiration date: 3/27/25    Re-eval: 3/27/25    Precautions: CVA, HTN, high BP, Orthostatic       Subjective: Pt had to cancel his fri appt.    Objective: See treatment below.     Neuro Re-ed:  facilitated RUE strengthening and coordination by adding error to movement, electrical stim, and use of air cast for tone reduction. Pt completed shoulder flexion, extension with added resistance from theraband to increase ROM of RUE.     Thera Ex:  Facilitated R hand strengthening using calibratted gripper on 2nd setting; pt completed x 20 reps total, only 5 at a time to increase R  strength.      In stance, pt using resistive tongs with 2 yellow bands, facilitated grasping large blocks from mat into extension/abduction, placing to target on floor level with use of gravity to assist pt during movement and prevent compensation, improving proximal > distal teaming, and R grasp strength. Pt dropped several from tongs; then grasping from floor level.     Assessment: Tolerated treatment well. Patient would benefit from continued skilled OT. Pt demo G challenge with added error to RUE without compensation.  Pt demo continued weakness in hand evident by dropping of blocks from hand and req stabilization assist throughout movements of proximal to distal teaming.    Plan:  Continued skilled OT per POC

## 2025-03-12 NOTE — PROGRESS NOTES
Daily Note     Today's date: 3/12/2025  Patient name: Tommie Taylor  : 1965  MRN: [id number]  Referring provider: Harsahd Romo*  Dx:   Encounter Diagnosis     ICD-10-CM    1. History of CVA (cerebrovascular accident)  Z86.73       2. Weakness of right arm  R29.898           Start Time: 1200  Stop Time: 1243  Total time in clinic (min): 43 minutes    Subjective: Having a hard time with transportation      Objective: See treatment diary below      Assessment: Pt tolerated the treatment well. Pt performed walking on the treadmill to promote cardiovascular endurance/neural priming for the session. Pt was challenged with navigating even/uneven surfaces with cog dual task of blazepods. Pt had 2 LOB, but able to use reactive stepping strategies to maintain balance.  Pt is progressing with phil navigation with improved R foot clearance. Pt demonstrated fatigue post-session. Pt would benefit from further skilled PT sessions to address deficits in endurance, strength, balance, activity tolerance, and overall safety needed to maximize the pt's function and quality of life.       Plan: Continue per plan of care.  Progress treatment as tolerated.       Long Term Goal Expiration Date: (2025)  POC Expiration Date: (2025)    Visit count: 2 of 10; PN due 2025    POC expires Unit limit Auth Expiration date PT/OT/ST + Visit Limit?   25 6 PT/OT 25 $2410 CAP/BOMN   3/28/25 6 PT/OT 25 $2410 CAP/BOMN                     Visit/Unit Tracking  AUTH Status:  Date 01/08 01/13 01/20 01/31 02/03 02/07  PN   PN 3/7   Every 10 visits  Used 1 2 3 4 5 6 1 2 3 4 1    Remaining  9 8 7 6 5 4 9 8 7 6 9      AUTH Status:  Date 3/12             Every 10 visits  Used 2              Remaining  8                  Precautions: Hx of stroke, HTN, orthostatic hypotension, T2DM, fall risk       Manuals                                    Neuro Re-Ed        "  Pt education      Pt education on FGA, mCTSIB, TUG, and their relation to fall risk    HIGT Solo  1.3 mph   0% incline   7.5# AW on R  X 8 min     Neural priming  Solo  1.5 mph   0% incline   7.5# AW on R  X 8 min     Neural priming Solo  1.2 mph   0% incline   4# AW on R   X 4 min     X 5 min without AW     X 9 min total     Neural priming  Solo  1.4 mph   0% incline   X 10 min     Neural priming     STS With Bolster with 9# AW hooked on   2 x 10  X 10  No UE With pink med ball   2 x 10  With pink med ball   2 x 10     Hurdles  Solo  1/4 track   7.5# AW on R   (6) pool noodles   Fwd x 4 laps (reciprocal)   Lat x 3 laps  Solo  1/4 track   7.5# AW on R   (6) 8\" yellow hurdles   Fwd x 2 laps  Lat x 2 laps Solo  1/4 track   4# AW on R   (6) 12\" hurdles   Fwd x 2 laps   Solo  1/4 track   5# AW on R   (6) 12\" hurdles   Fwd x 2 laps    Blazepods  Solo  1/4 track   7.5# AW on R  4 pods   1:30 min rounds   Fwd/bwd walking   4# AW on bolster   Round 1: 12 hits   Round 2: 12 hits     Solo  1/4 track   7.5# AW on R   (3) airex  by (1) 8\" step and (1) 6\" step  4 pods scattered   Fwd   1:30 min round   Round 1: 6 hits  Round 2: 5 hits      Step-ups    Solo  8\" step   5# AW on R   Lat x 10 each leg  Solo  8\" step   5# AW on R   Lat x 10 each leg    Dynamic Balance Solo  1/4 track   Bolster with 4# AW  7.5# AW on R  Fwd/bwd walking   X 3 laps   VC to step past each foot   Solo  1/4 track   4# AW on R   Bwd walking   X 2 laps     Solo  1/4 track   (2) NBOS foam beams   Tandem walking   X 1 lap    Solo  1/4 track   Tandem walking on firm   X 1 lap  Solo  1/4 track   5# AW on R  Tandem walking on firm   X 2 laps  Solo  1/4 track   Bolster with 4# AW  Fwd/bwd walking   X 2 laps    Resisted walking    Solo  1/4 track   4# AW on R   Lateral stepping with 5 spots on floor for visual cue  X 2 laps  Solo  1/4 track   5# AW on R   Purple TB  Lateral stepping with 5 spots on floor for visual cue  X 3 laps     Solo  1/4 track   5# " "AW on R   Purple TB  Fwd/bwd walking   5 spots on floor  \"Suicides\"  X 2 rounds    Hallway (100ft)   Purple TB   5# AW on R   X 2 laps     Solo  1/2 track   Purple TB   5# AW on R   X 2 laps      Agility ladder        Stair navigation    Solo  1/4 track   4 # AW on R   Whole staircase under track   Reciprocal pattern   1 UE  X 4 laps      Velcro cards Solo  Airex   X 10 cards   Head turns   X 5 min       Ther Ex        Pt education      Pt education on 6MWT, 5xSTS, MMT                                                            Ther Activity                        Gait Training                        Modalities                                                                   "

## 2025-03-14 ENCOUNTER — APPOINTMENT (OUTPATIENT)
Dept: OCCUPATIONAL THERAPY | Facility: CLINIC | Age: 60
End: 2025-03-14
Payer: MEDICARE

## 2025-03-14 ENCOUNTER — APPOINTMENT (OUTPATIENT)
Dept: PHYSICAL THERAPY | Facility: CLINIC | Age: 60
End: 2025-03-14
Payer: MEDICARE

## 2025-03-17 ENCOUNTER — PATIENT OUTREACH (OUTPATIENT)
Dept: INTERNAL MEDICINE CLINIC | Facility: CLINIC | Age: 60
End: 2025-03-17

## 2025-03-17 NOTE — PROGRESS NOTES
Note:   03/17/2025    CMOC spoke to [employer] on this date and pt has an evaluation on 03/20/2025 @ 2:PM. Pt will be getting a ride from [employer] to the evaluation.     CMOC spoke to significant other and she is aware of his evaluation with [employer].     Next outreach is schedule for 03/28/2025.

## 2025-03-18 ENCOUNTER — OFFICE VISIT (OUTPATIENT)
Dept: NEPHROLOGY | Facility: CLINIC | Age: 60
End: 2025-03-18
Payer: MEDICARE

## 2025-03-18 VITALS
HEART RATE: 74 BPM | OXYGEN SATURATION: 99 % | WEIGHT: 258 LBS | SYSTOLIC BLOOD PRESSURE: 124 MMHG | DIASTOLIC BLOOD PRESSURE: 66 MMHG | BODY MASS INDEX: 41.46 KG/M2 | HEIGHT: 66 IN

## 2025-03-18 DIAGNOSIS — E83.9 CHRONIC KIDNEY DISEASE-MINERAL AND BONE DISORDER (CKD-MBD): ICD-10-CM

## 2025-03-18 DIAGNOSIS — E83.42 HYPOMAGNESEMIA: ICD-10-CM

## 2025-03-18 DIAGNOSIS — E11.22 TYPE 2 DIABETES MELLITUS WITH STAGE 4 CHRONIC KIDNEY DISEASE, WITH LONG-TERM CURRENT USE OF INSULIN (HCC): Primary | ICD-10-CM

## 2025-03-18 DIAGNOSIS — R80.1 PERSISTENT PROTEINURIA: ICD-10-CM

## 2025-03-18 DIAGNOSIS — D63.1 ANEMIA DUE TO STAGE 4 CHRONIC KIDNEY DISEASE  (HCC): ICD-10-CM

## 2025-03-18 DIAGNOSIS — I12.9 BENIGN HYPERTENSION WITH CKD (CHRONIC KIDNEY DISEASE) STAGE IV (HCC): ICD-10-CM

## 2025-03-18 DIAGNOSIS — M89.9 CHRONIC KIDNEY DISEASE-MINERAL AND BONE DISORDER (CKD-MBD): ICD-10-CM

## 2025-03-18 DIAGNOSIS — Z01.818 ENCOUNTER FOR OTHER PREPROCEDURAL EXAMINATION: ICD-10-CM

## 2025-03-18 DIAGNOSIS — N18.4 BENIGN HYPERTENSION WITH CKD (CHRONIC KIDNEY DISEASE) STAGE IV (HCC): ICD-10-CM

## 2025-03-18 DIAGNOSIS — E55.9 VITAMIN D DEFICIENCY: ICD-10-CM

## 2025-03-18 DIAGNOSIS — N18.9 CHRONIC KIDNEY DISEASE-MINERAL AND BONE DISORDER (CKD-MBD): ICD-10-CM

## 2025-03-18 DIAGNOSIS — Z71.89 ADVANCED CARE PLANNING/COUNSELING DISCUSSION: Primary | ICD-10-CM

## 2025-03-18 DIAGNOSIS — N18.4 ANEMIA DUE TO STAGE 4 CHRONIC KIDNEY DISEASE  (HCC): ICD-10-CM

## 2025-03-18 DIAGNOSIS — N18.4 TYPE 2 DIABETES MELLITUS WITH STAGE 4 CHRONIC KIDNEY DISEASE, WITH LONG-TERM CURRENT USE OF INSULIN (HCC): Primary | ICD-10-CM

## 2025-03-18 DIAGNOSIS — Z79.4 TYPE 2 DIABETES MELLITUS WITH STAGE 4 CHRONIC KIDNEY DISEASE, WITH LONG-TERM CURRENT USE OF INSULIN (HCC): Primary | ICD-10-CM

## 2025-03-18 DIAGNOSIS — N18.4 CKD (CHRONIC KIDNEY DISEASE) STAGE 4, GFR 15-29 ML/MIN (HCC): ICD-10-CM

## 2025-03-18 PROCEDURE — 99497 ADVNCD CARE PLAN 30 MIN: CPT | Performed by: PHYSICIAN ASSISTANT

## 2025-03-18 PROCEDURE — 99214 OFFICE O/P EST MOD 30 MIN: CPT | Performed by: PHYSICIAN ASSISTANT

## 2025-03-18 RX ORDER — ERGOCALCIFEROL 1.25 MG/1
50000 CAPSULE ORAL WEEKLY
Qty: 12 CAPSULE | Refills: 0 | Status: SHIPPED | OUTPATIENT
Start: 2025-03-18

## 2025-03-18 RX ORDER — MAGNESIUM GLUCONATE 27 MG(500)
500 TABLET ORAL EVERY OTHER DAY
Qty: 90 TABLET | Refills: 1 | Status: SHIPPED | OUTPATIENT
Start: 2025-03-18

## 2025-03-18 NOTE — ASSESSMENT & PLAN NOTE
UPC ratio 3.6 and albumin creatinine ratio 2648 which are above goal  He is not on RAAS inhibition due to advanced kidney disease and recent ROSINA.

## 2025-03-18 NOTE — ASSESSMENT & PLAN NOTE
Start ergocalciferol 50,000 units weekly.  Hold cholecalciferol during the 12 weeks.  Restart 2000 units cholecalciferol after completion of ergocalciferol course.   Orders:    ergocalciferol (ERGOCALCIFEROL) 1.25 MG (62879 UT) capsule; Take 1 capsule (50,000 Units total) by mouth once a week

## 2025-03-18 NOTE — ASSESSMENT & PLAN NOTE
Check studies prior to next visit.   PTH level mildly elevated.  Replete vitamin d first as below.   Orders:    magnesium gluconate (MAGONATE) 500 MG tablet; Take 1 tablet (500 mg total) by mouth every other day    Magnesium; Future    Phosphorus; Future

## 2025-03-18 NOTE — ASSESSMENT & PLAN NOTE
Slightly low.  Monitor.   He has not been taking magnesium gluconate.  Restart every other day.    Orders:    magnesium gluconate (MAGONATE) 500 MG tablet; Take 1 tablet (500 mg total) by mouth every other day

## 2025-03-18 NOTE — PATIENT INSTRUCTIONS
Start ergocalciferol 50,000 units weekly.  Hold cholecalciferol during the 12 weeks.  Restart 2000 units cholecalciferol after completion of ergocalciferol course.     Restart magnesium gluconate every other day.    Follow up with vascular surgery for evaluation for AVF/AVG and vein mapping ultrasound.     Follow up in 4 months.  Blood work in 6 weeks and 4 months.

## 2025-03-18 NOTE — PROGRESS NOTES
Daily Note     Today's date: 3/19/2025  Patient name: Tommie Taylor  : 1965  MRN: [id number]  Referring provider: Harshad Romo*  Dx:   Encounter Diagnosis     ICD-10-CM    1. History of CVA (cerebrovascular accident)  Z86.73       2. Weakness of right arm  R29.898           Start Time: 1100  Stop Time: 1145  Total time in clinic (min): 45 minutes    Subjective: I want to do the step again today      Objective: See treatment diary below      Assessment: Pt tolerated the treatment well. Pt performed biking on the Nu-step/walking on the treadmill to promote cardiovascular endurance/neural priming for the session. Pt was challenged with navigating uneven surfaces on red mat with river rocks underneath. Pt had increased LOB, but able to maintain balance with reactive stepping strategies.  Pt is progressing with step ups with improve R foot clearance this session. Pt demonstrated fatigue post-session. Pt would benefit from further skilled PT sessions to address deficits in endurance, strength, balance, activity tolerance, and overall safety needed to maximize the pt's function and quality of life.        Plan: Continue per plan of care.  Progress treatment as tolerated.       Long Term Goal Expiration Date: (2025)  POC Expiration Date: (2025)    Visit count: 3 of 10; PN due 2025    POC expires Unit limit Auth Expiration date PT/OT/ST + Visit Limit?   25 6 PT/OT 25 $2410 CAP/BOMN   3/28/25 6 PT/OT 25 $2410 CAP/BOMN                     Visit/Unit Tracking  AUTH Status:  Date 01/08 01/13 01/20 01/31 02/03 02/07  PN   PN 3/7   Every 10 visits  Used 1 2 3 4 5 6 1 2 3 4 1    Remaining  9 8 7 6 5 4 9 8 7 6 9      AUTH Status:  Date 3/12 3/18            Every 10 visits  Used 2 3             Remaining  8 7                 Precautions: Hx of stroke, HTN, orthostatic hypotension, T2DM, fall risk       Manuals                       "              Neuro Re-Ed         Pt education      Pt education on FGA, mCTSIB, TUG, and their relation to fall risk    HIGT Solo  1.3 mph   0% incline   7.5# AW on R  X 8 min     Neural priming  Solo  1.5 mph   0% incline   7.5# AW on R  X 8 min     Neural priming Solo  1.5 mph   0% incline   5# AW b/l  X 10 min     Neural priming Solo  1.4 mph   0% incline   X 10 min     Neural priming     STS With Bolster with 9# AW hooked on   2 x 10  X 10  No UE X 5   No UE With pink med ball   2 x 10     Hurdles  Solo  1/4 track   7.5# AW on R   (6) pool noodles   Fwd x 4 laps (reciprocal)   Lat x 3 laps  Solo  1/4 track   7.5# AW on R   (6) 8\" yellow hurdles   Fwd x 2 laps  Lat x 2 laps  Solo  1/4 track   5# AW on R   (6) 12\" hurdles   Fwd x 2 laps    Blazepods  Solo  1/4 track   7.5# AW on R  4 pods   1:30 min rounds   Fwd/bwd walking   4# AW on bolster   Round 1: 12 hits   Round 2: 12 hits     Solo  1/4 track   7.5# AW on R   (3) airex  by (1) 8\" step and (1) 6\" step  4 pods scattered   Fwd   1:30 min round   Round 1: 6 hits  Round 2: 5 hits Solo  1/4 track   5# AW b/l   Small/med river rocks under red mat   Quick turning   1 min rounds   4 pods   Round 1: 5 hits   Round 2: 5 hits        Step-ups   Solo  8\" step   5# AW b/l   Lat x 6 each leg   Fwd x 10 each leg Solo  8\" step   5# AW on R   Lat x 10 each leg  Solo  8\" step   5# AW on R   Lat x 10 each leg    Dynamic Balance Solo  1/4 track   Bolster with 4# AW  7.5# AW on R  Fwd/bwd walking   X 3 laps   VC to step past each foot   Solo  1/4 track   Obstacle course   (3) 6\" hurdles  by (3) airex, and small/med river rocks   Fwd x 3 laps  Solo  1/4 track   5# AW on R  Tandem walking on firm   X 2 laps  Solo  1/4 track   Bolster with 4# AW  Fwd/bwd walking   X 2 laps    Resisted walking     Solo  1/4 track   5# AW on R   Purple TB  Lateral stepping with 5 spots on floor for visual cue  X 3 laps     Solo  1/4 track   5# AW on R   Purple TB  Fwd/bwd walking " "  5 spots on floor  \"Suicides\"  X 2 rounds    Hallway (100ft)   Purple TB   5# AW on R   X 2 laps     Solo  1/2 track   Purple TB   5# AW on R   X 2 laps      Agility ladder        Stair navigation         Velcro cards Solo  Airex   X 10 cards   Head turns   X 5 min       Ther Ex        Pt education      Pt education on 6MWT, 5xSTS, MMT                                                            Ther Activity                        Gait Training                        Modalities                                                                     "

## 2025-03-18 NOTE — PROGRESS NOTES
Name: Tommie Taylor      : 1965      MRN: [id number]  Encounter Provider: Elida Nielsen PA-C  Encounter Date: 3/18/2025   Encounter department: [facility]  :  Assessment & Plan  Type 2 diabetes mellitus with stage 4 chronic kidney disease, with long-term current use of insulin ([facility])    Lab Results   Component Value Date    HGBA1C 6.7 (H) 02/10/2025   Management per PCP and/or endocrinology.           Benign hypertension with CKD (chronic kidney disease) stage IV ([facility])  BP acceptable and at goal.  Continue current regimen.  Marie (significant other) does pills and patient does not know what he takes.        CKD (chronic kidney disease) stage 4, GFR 15-29 ml/min ([facility])  Lab Results   Component Value Date    EGFR 18 2025    EGFR 21 2025    EGFR 30 02/10/2025    CREATININE 3.50 (H) 2025    CREATININE 3.04 (H) 2025    CREATININE 2.27 (H) 02/10/2025   Baseline creatinine is 3.5-4 since recent admission in November however has had some lower readings as well.  Continue to monitor.  Etiology is due to diabetic nephropathy and hypertensive nephrosclerosis.  Electrolytes are stable.   Kidney Smart: declined  Comprehensive Kidney Care: completed 3/18/25  Modality: in center hemodialysis  Access: referred to vascular / vein mapping 3/18/25    Orders:    Basic metabolic panel; Future    Phosphorus; Future    CBC; Future    Magnesium; Future    PTH, intact; Future    Protein / creatinine ratio, urine; Future    Basic metabolic panel; Future    Ambulatory Referral to Vascular Surgery; Future    VAS upper limb vein mapping; Future    Chronic kidney disease-mineral and bone disorder (CKD-MBD)  Check studies prior to next visit.   PTH level mildly elevated.  Replete vitamin d first as below.   Orders:    magnesium gluconate (MAGONATE) 500 MG tablet; Take 1 tablet (500 mg total) by mouth every other day    Magnesium; Future    Phosphorus; Future    Vitamin D  deficiency  Start ergocalciferol 50,000 units weekly.  Hold cholecalciferol during the 12 weeks.  Restart 2000 units cholecalciferol after completion of ergocalciferol course.   Orders:    ergocalciferol (ERGOCALCIFEROL) 1.25 MG (73237 UT) capsule; Take 1 capsule (50,000 Units total) by mouth once a week    Anemia due to stage 4 chronic kidney disease  (HCC)  HGB mildly below normal.  Continue to monitor.   Iron stores: acceptable.  No KOBI with recent CVA (11/2024)  Orders:    CBC; Future    Persistent proteinuria  UPC ratio 3.6 and albumin creatinine ratio 2648 which are above goal  He is not on RAAS inhibition due to advanced kidney disease and recent ROSINA.       Hypomagnesemia  Slightly low.  Monitor.   He has not been taking magnesium gluconate.  Restart every other day.    Orders:    magnesium gluconate (MAGONATE) 500 MG tablet; Take 1 tablet (500 mg total) by mouth every other day    Encounter for other preprocedural examination    Orders:    VAS upper limb vein mapping; Future    Follow up with Dr. No or an AP in 4 months. Blood work in 2 months and 4 months.     Patient Instructions   Start ergocalciferol 50,000 units weekly.  Hold cholecalciferol during the 12 weeks.  Restart 2000 units cholecalciferol after completion of ergocalciferol course.     Restart magnesium gluconate every other day.    Follow up with vascular surgery for evaluation for AVF/AVG and vein mapping ultrasound.     Follow up in 4 months.  Blood work in 6 weeks and 4 months.    It was a pleasure evaluating your patient in the office today. Thank you for allowing our team to participate in the care of Tommie Taylor. Please do not hesitate to contact our team if further issues/questions shall arise in the interim.     History of Present Illness   HPI  Tommie Taylor is a 59 y.o. male who presents for follow up of advanced CKD.   He had hospitalizations in November after an acute CVA and in February presenting with stroke like  symptoms.  He was last seen in December by Dr. No.  Since then, he is feeling well.  He states he is getting better every day.  He is undergoing physical therapy but still has residual right arm/hand weakness.  He understands dialysis is in his future.  He would prefer in center dialysis.  His significant other is also on in center hemodialysis.  He would not want to learn how to manage a machine on his own.  History obtained from: patient    Review of Systems  Medical History Reviewed by provider this encounter:  Tobacco  Allergies  Meds  Problems  Med Hx  Surg Hx  Fam Hx     .     Current Outpatient Medications on File Prior to Visit   Medication Sig Dispense Refill    acetaminophen (TYLENOL) 325 mg tablet Take 2 tablets (650 mg total) by mouth every 6 (six) hours as needed for mild pain, fever or headaches      ammonium lactate (LAC-HYDRIN) 12 % cream Apply topically as needed for dry skin 385 g 0    aspirin 81 mg EC tablet Take 1 tablet (81 mg total) by mouth daily 30 tablet 2    atenolol (TENORMIN) 25 mg tablet Take 1 tablet (25 mg total) by mouth daily 90 tablet 3    atorvastatin (LIPITOR) 20 mg tablet Take 1 tablet (20 mg total) by mouth daily 90 tablet 3    Blood Glucose Monitoring Suppl (FreeStyle Lite) w/Device KIT Use as instructed 4 times a day 1 kit 0    calcium carbonate (TUMS) 500 mg chewable tablet Chew 2 tablets (1,000 mg total) 2 (two) times a day as needed for indigestion or heartburn      Cholecalciferol (VITAMIN D3) 1,000 units tablet Take 2 tablets (2,000 Units total) by mouth daily 180 tablet 0    glucose blood (FREESTYLE LITE) test strip CHECK BLOOD SUGARS FOUR TIMES DAILY 400 strip 3    NIFEdipine (ADALAT CC) 90 mg 24 hr tablet Take 1 tablet (90 mg total) by mouth daily 90 tablet 1    omeprazole (PriLOSEC) 20 mg delayed release capsule Take 20 mg by mouth daily      Blood Glucose Monitoring Suppl (Assure 3 Meter) KIT Use 1 each 4 (four) times a day Fine to substitute other brand if  "not covered by insurance (Patient not taking: Reported on 2/12/2025) 1 kit 2    insulin NPH-insulin regular (NovoLIN 70/30 ReliOn) 100 units/mL subcutaneous injection Inject 20 Units under the skin daily before breakfast AND 10 Units in the evening. Take before meals. Use 20 units with breakfast and 10 units with dinner. (Patient not taking: Reported on 3/18/2025) 10 mL 6    Insulin Pen Needle 31G X 8 MM MISC Check sugars three times a day (Patient not taking: Reported on 2/12/2025) 100 each 1    Lancets (freestyle) lancets Use as instructed (Patient not taking: Reported on 2/12/2025) 300 each 0    [DISCONTINUED] magnesium gluconate (MAGONATE) 500 MG tablet Take 1 tablet (500 mg total) by mouth daily Do not start before November 16, 2024. (Patient not taking: Reported on 1/28/2025) 30 tablet 0     No current facility-administered medications on file prior to visit.     Objective   /66 (BP Location: Left arm, Patient Position: Sitting, Cuff Size: Large)   Pulse 74   Ht 5' 6\" (1.676 m)   Wt 117 kg (258 lb)   SpO2 99%   BMI 41.64 kg/m²      Physical Exam  General: NAD  Neuro: alert awake  Psych: mood and affect appropriate  Skin: no rash  Eyes: anicteric  ENMT: mm moist  Neck: no masses  Respiratory: ctab  Cardiovascular: rrr  Extremities: mild bilateral LE edema  Gastrointestinal: soft nt nd    Laboratory Results:    Results for orders placed or performed in visit on 02/14/25   Magnesium   Result Value Ref Range    Magnesium 1.8 (L) 1.9 - 2.7 mg/dL   CBC w/o differential   Result Value Ref Range    WBC 5.42 4.31 - 10.16 Thousand/uL    RBC 4.58 3.88 - 5.62 Million/uL    Hemoglobin 11.7 (L) 12.0 - 17.0 g/dL    Hematocrit 36.4 (L) 36.5 - 49.3 %    MCV 80 (L) 82 - 98 fL    MCH 25.5 (L) 26.8 - 34.3 pg    MCHC 32.1 31.4 - 37.4 g/dL    RDW 14.4 11.6 - 15.1 %    Platelets 209 149 - 390 Thousands/uL    MPV 10.1 8.9 - 12.7 fL   Albumin / creatinine urine ratio   Result Value Ref Range    Creatinine, Ur 70.3 Reference " range not established. mg/dL    Albumin,U,Random 1,861.6 (H) <20.0 mg/L    Albumin Creat Ratio 2,648 (H) 0 - 30 mg/g creatinine   PTH, intact   Result Value Ref Range    .0 (H) 12.0 - 88.0 pg/mL   Vitamin D 25 hydroxy   Result Value Ref Range    Vit D, 25-Hydroxy 12.3 (L) 30.0 - 100.0 ng/mL   Phosphorus   Result Value Ref Range    Phosphorus 3.8 2.7 - 4.5 mg/dL   Comprehensive metabolic panel   Result Value Ref Range    Sodium 138 135 - 147 mmol/L    Potassium 4.1 3.5 - 5.3 mmol/L    Chloride 108 96 - 108 mmol/L    CO2 24 21 - 32 mmol/L    ANION GAP 6 4 - 13 mmol/L    BUN 20 5 - 25 mg/dL    Creatinine 3.50 (H) 0.60 - 1.30 mg/dL    Glucose, Fasting 101 (H) 65 - 99 mg/dL    Calcium 9.0 8.4 - 10.2 mg/dL    AST 13 13 - 39 U/L    ALT 8 7 - 52 U/L    Alkaline Phosphatase 81 34 - 104 U/L    Total Protein 6.7 6.4 - 8.4 g/dL    Albumin 3.5 3.5 - 5.0 g/dL    Total Bilirubin 0.26 0.20 - 1.00 mg/dL    eGFR 18 ml/min/1.73sq m   Lipid Panel with Direct LDL reflex   Result Value Ref Range    Cholesterol 122 See Comment mg/dL    Triglycerides 141 See Comment mg/dL    HDL, Direct 37 (L) >=40 mg/dL    LDL Calculated 57 0 - 100 mg/dL   Fructosamine   Result Value Ref Range    Fructosamine 208 0 - 285 umol/L   Protein / creatinine ratio, urine   Result Value Ref Range    Creatinine, Ur 70.3 Reference range not established. mg/dL    Protein Urine Random 249.6 Reference range not established. mg/dL    Prot/Creat Ratio, Ur 3.6 (H) 0.0 - 0.1   Urinalysis with microscopic   Result Value Ref Range    Color, UA Colorless     Clarity, UA Clear     Specific Gravity, UA 1.009 1.003 - 1.030    pH, UA 6.5 4.5, 5.0, 5.5, 6.0, 6.5, 7.0, 7.5, 8.0    Leukocytes, UA Negative Negative    Nitrite, UA Negative Negative    Protein,  (3+) (A) Negative mg/dl    Glucose, UA Trace (A) Negative mg/dl    Ketones, UA Negative Negative mg/dl    Urobilinogen, UA <2.0 <2.0 mg/dl mg/dl    Bilirubin, UA Negative Negative    Occult Blood, UA Trace (A)  Negative    RBC, UA 1-2 None Seen, 1-2 /hpf    WBC, UA 1-2 None Seen, 1-2 /hpf    Epithelial Cells Occasional None Seen, Occasional /hpf    Bacteria, UA None Seen None Seen, Occasional /hpf   Tissue Transglutaminase, IgA   Result Value Ref Range    Tissue Transglutaminase tTG IgA 1.0 See Comment U/mL   IgA   Result Value Ref Range     66 - 433 mg/dL   TIBC Panel (incl. Iron, TIBC, % Iron Saturation)   Result Value Ref Range    Iron Saturation 28 15 - 50 %    TIBC 203 (L) 250 - 450 ug/dL    Iron 56 50 - 212 ug/dL    Transferrin 145 (L) 203 - 362 mg/dL    UIBC 147 (L) 155 - 355 ug/dL   Ferritin   Result Value Ref Range    Ferritin 154 24 - 336 ng/mL

## 2025-03-18 NOTE — ASSESSMENT & PLAN NOTE
BP acceptable and at goal.  Continue current regimen.  Marie (significant other) does pills and patient does not know what he takes.

## 2025-03-18 NOTE — ASSESSMENT & PLAN NOTE
Lab Results   Component Value Date    EGFR 18 02/14/2025    EGFR 21 02/11/2025    EGFR 30 02/10/2025    CREATININE 3.50 (H) 02/14/2025    CREATININE 3.04 (H) 02/11/2025    CREATININE 2.27 (H) 02/10/2025   Baseline creatinine is 3.5-4 since recent admission in November however has had some lower readings as well.  Continue to monitor.  Etiology is due to diabetic nephropathy and hypertensive nephrosclerosis.  Electrolytes are stable.   Kidney Smart: declined  Comprehensive Kidney Care: completed 3/18/25  Modality: in center hemodialysis  Access: referred to vascular / vein mapping 3/18/25    Orders:    Basic metabolic panel; Future    Phosphorus; Future    CBC; Future    Magnesium; Future    PTH, intact; Future    Protein / creatinine ratio, urine; Future    Basic metabolic panel; Future    Ambulatory Referral to Vascular Surgery; Future    VAS upper limb vein mapping; Future

## 2025-03-18 NOTE — ASSESSMENT & PLAN NOTE
Lab Results   Component Value Date    HGBA1C 6.7 (H) 02/10/2025   Management per PCP and/or endocrinology.

## 2025-03-18 NOTE — ACP (ADVANCE CARE PLANNING)
"Advanced Care Planning Progress Note    Serious Illness Conversation    No data filed  Serious Illness Conversation    1. What is your understanding now of where you are with your illness?  Prognostic Understanding: appropriate understanding of prognosis     2. How much information about what is likely to be ahead with your illness would you like to have?  Information: patient wants some information, but no \"\"bad news\"\"     3. What did you (clinician) communicate to the patient?     4. If your health situation worsens, what are your most important goals?  \"I don't know.  I haven't thought about it.\"     5. What are the biggest fears and worries about the future and your health?  Fears/Worries: being alone     6. What abilities are so critical to your life that you cannot imagine living without them?  \"My sight.\"       7. What gives you strength as you think about the future with your illness?  \"That I am getting better .The more I work the better I am getting.\"     8. If you become sicker, how much are you willing to go through for the possibility of gaining more time?  Be in the hospital: Yes Have a feeding tube: No   Be in the ICU: Yes Live in a nursing home: No   Be on a ventilator: No Be uncomfortable: No   Be on dialysis: Yes Undergo aggressive test and/or procedures: Yes   9. How much does your proxy and family know about your priorities and wishes?  Discussion Discussion: extensive discussion with family about goals and wishes        How does this plan sound to you? I will do everything I can to help you through this.  Patient verbalized understanding of the plan     I have spent 20 minutes speaking with my patient on advanced care planning today or during this visit     Advanced directives  Five Wishes: Patient does not have Five Wishes- would like information          Elida Nielsen PA-C     NEPHROLOGY ADVANCED GOALS OF CARE MEETING  Tommie Taylor 59 y.o. male MRN: [id number]  3/18/2025    ASSESSMENT " and PLAN:    I had the pleasure of seeing Tommie Taylor today in the renal clinic for discussion of the patient's goals of care. Our discussion today will focus on helping Tommie achieve their desired goals, long term goals of care and how best to achieve those goals. The participants during this visit include the patient alone.      Kidney Smart Class: Declined Kidney Smart Class  Would Pursue Dialysis if Needed: Yes  Dialysis Access: No Access   Advanced Directive: Not Completed  Code Status: Level 3: DNAR and DNI  POA: Marie Sevilla  Do they need to be referred to Palliative Care: No  Do they need to be referred to Hospice: No  Length/Time of Meetin Minutes  Outpatient Nephrologist: Steven No MD    Discussion and Plan:    Mr. Taylor is a pleasnt 59 year old male with advanced CKD who presents for follow up as noted in prior note and also for discussion of dialysis and goals of care.  We discussed dialysis modalities and he decided on in center hemodialysis.  He was referred for vein mapping and vascular appointment.  We discussed code status and importance of a POA.  He does not want CPR or intubation and states if he is that bad just let him go.  He would be agreeable to surgery and ICU stay.  He is not agreeable to a feeding tube and nursing home. His biggest fear is being alone.  He understands he is approaching dialysis.  He continues to rehab after his acute CVA in 2024.          Medications:    Current Outpatient Medications:     acetaminophen (TYLENOL) 325 mg tablet, Take 2 tablets (650 mg total) by mouth every 6 (six) hours as needed for mild pain, fever or headaches, Disp: , Rfl:     ammonium lactate (LAC-HYDRIN) 12 % cream, Apply topically as needed for dry skin, Disp: 385 g, Rfl: 0    aspirin 81 mg EC tablet, Take 1 tablet (81 mg total) by mouth daily, Disp: 30 tablet, Rfl: 2    atenolol (TENORMIN) 25 mg tablet, Take 1 tablet (25 mg total) by mouth daily, Disp: 90 tablet, Rfl: 3     atorvastatin (LIPITOR) 20 mg tablet, Take 1 tablet (20 mg total) by mouth daily, Disp: 90 tablet, Rfl: 3    Blood Glucose Monitoring Suppl (Assure 3 Meter) KIT, Use 1 each 4 (four) times a day Fine to substitute other brand if not covered by insurance (Patient not taking: Reported on 2/12/2025), Disp: 1 kit, Rfl: 2    Blood Glucose Monitoring Suppl (FreeStyle Lite) w/Device KIT, Use as instructed 4 times a day, Disp: 1 kit, Rfl: 0    calcium carbonate (TUMS) 500 mg chewable tablet, Chew 2 tablets (1,000 mg total) 2 (two) times a day as needed for indigestion or heartburn, Disp: , Rfl:     Cholecalciferol (VITAMIN D3) 1,000 units tablet, Take 2 tablets (2,000 Units total) by mouth daily, Disp: 180 tablet, Rfl: 0    ergocalciferol (ERGOCALCIFEROL) 1.25 MG (93640 UT) capsule, Take 1 capsule (50,000 Units total) by mouth once a week, Disp: 12 capsule, Rfl: 0    glucose blood (FREESTYLE LITE) test strip, CHECK BLOOD SUGARS FOUR TIMES DAILY, Disp: 400 strip, Rfl: 3    insulin NPH-insulin regular (NovoLIN 70/30 ReliOn) 100 units/mL subcutaneous injection, Inject 20 Units under the skin daily before breakfast AND 10 Units in the evening. Take before meals. Use 20 units with breakfast and 10 units with dinner. (Patient not taking: Reported on 3/18/2025), Disp: 10 mL, Rfl: 6    Insulin Pen Needle 31G X 8 MM MISC, Check sugars three times a day (Patient not taking: Reported on 2/12/2025), Disp: 100 each, Rfl: 1    Lancets (freestyle) lancets, Use as instructed (Patient not taking: Reported on 2/12/2025), Disp: 300 each, Rfl: 0    magnesium gluconate (MAGONATE) 500 MG tablet, Take 1 tablet (500 mg total) by mouth every other day, Disp: 90 tablet, Rfl: 1    NIFEdipine (ADALAT CC) 90 mg 24 hr tablet, Take 1 tablet (90 mg total) by mouth daily, Disp: 90 tablet, Rfl: 1    omeprazole (PriLOSEC) 20 mg delayed release capsule, Take 20 mg by mouth daily, Disp: , Rfl:

## 2025-03-18 NOTE — ASSESSMENT & PLAN NOTE
HGB mildly below normal.  Continue to monitor.   Iron stores: acceptable.  No KOBI with recent CVA (11/2024)  Orders:    CBC; Future

## 2025-03-19 ENCOUNTER — OFFICE VISIT (OUTPATIENT)
Dept: PHYSICAL THERAPY | Facility: CLINIC | Age: 60
End: 2025-03-19
Payer: MEDICARE

## 2025-03-19 ENCOUNTER — OFFICE VISIT (OUTPATIENT)
Dept: OCCUPATIONAL THERAPY | Facility: CLINIC | Age: 60
End: 2025-03-19
Payer: MEDICARE

## 2025-03-19 ENCOUNTER — OFFICE VISIT (OUTPATIENT)
Dept: NEUROLOGY | Facility: CLINIC | Age: 60
End: 2025-03-19
Payer: MEDICARE

## 2025-03-19 VITALS
SYSTOLIC BLOOD PRESSURE: 152 MMHG | HEART RATE: 63 BPM | DIASTOLIC BLOOD PRESSURE: 88 MMHG | BODY MASS INDEX: 41.79 KG/M2 | WEIGHT: 258.9 LBS | OXYGEN SATURATION: 98 % | TEMPERATURE: 97.9 F

## 2025-03-19 DIAGNOSIS — Z86.73 HISTORY OF CVA (CEREBROVASCULAR ACCIDENT): Primary | ICD-10-CM

## 2025-03-19 DIAGNOSIS — Z86.73 HISTORY OF CVA (CEREBROVASCULAR ACCIDENT): ICD-10-CM

## 2025-03-19 DIAGNOSIS — R29.898 WEAKNESS OF RIGHT ARM: ICD-10-CM

## 2025-03-19 DIAGNOSIS — I12.9 TYPE 2 DIABETES MELLITUS WITH STAGE 4 CHRONIC KIDNEY DISEASE AND HYPERTENSION (HCC): ICD-10-CM

## 2025-03-19 DIAGNOSIS — E78.2 MIXED HYPERLIPIDEMIA: ICD-10-CM

## 2025-03-19 DIAGNOSIS — E11.22 TYPE 2 DIABETES MELLITUS WITH STAGE 4 CHRONIC KIDNEY DISEASE AND HYPERTENSION (HCC): ICD-10-CM

## 2025-03-19 DIAGNOSIS — N18.4 TYPE 2 DIABETES MELLITUS WITH STAGE 4 CHRONIC KIDNEY DISEASE AND HYPERTENSION (HCC): ICD-10-CM

## 2025-03-19 DIAGNOSIS — G47.33 OSA (OBSTRUCTIVE SLEEP APNEA): ICD-10-CM

## 2025-03-19 DIAGNOSIS — I10 PRIMARY HYPERTENSION: Primary | ICD-10-CM

## 2025-03-19 PROCEDURE — 99214 OFFICE O/P EST MOD 30 MIN: CPT

## 2025-03-19 PROCEDURE — 97530 THERAPEUTIC ACTIVITIES: CPT

## 2025-03-19 PROCEDURE — 97110 THERAPEUTIC EXERCISES: CPT

## 2025-03-19 PROCEDURE — 97112 NEUROMUSCULAR REEDUCATION: CPT

## 2025-03-19 NOTE — ASSESSMENT & PLAN NOTE
I had the pleasure of seeing Tommie today in the office at [facility] in [place].  He is presenting today for an office visit follow-up/hospital follow-up appointment in regard to his most recent history of CVA.  It was noted since his last visit in the office on 01/06/2025 the patient presented to the hospital on 02/10/2025 with strokelike symptoms.  Was noted that the patient was having some right-sided facial droop, noted to have nausea and dizziness as well.  The patient was properly evaluated and for acute stroke and did not show the patient had any acute stroke at this time.  was determined that this was likely in the setting of stroke or acute accidents from previous left corona radiata infarct.  The patient did not receive any TNK due to nondisabling symptoms.  No thrombectomy due to no LVO as well.  The patient was recommended to continue aspirin 81 mg daily and atorvastatin 20 mg daily on discharge.  He was expected to follow-up outpatient with neurology for further evaluation.    Since his hospitalization, patient has not noted any new strokelike symptoms.  He still having some right arm weakness, and slight noticeable right facial droop on examination.  The patient is currently taking aspirin 81 mg daily for secondary stroke prevention and also taking atorvastatin 20 mg daily.  /88 today in the office, advised patient to continue to follow with PCP in regards to blood pressure medication management.  Should also require a blood pressure cuff at home.  Most recent LDL was 57 mg/dL and most recent hemoglobin A1c 6.7%.  Patient did have a Zio patch from cardiology although it does not appear that was recently read.  Has been doing well with trying to stay active and going to physical therapy.  Patient has also been trying to work on improving his diet as well.  Still following with CPAP compliance for DAISY no other questions or concerns.  Patient can follow-up in approximately 6  months time with Ananth WOO    - Advised patient to continue to follow with current cardiology referral at this point in time.  Advised the patient that we would likely require additional monitoring such as Zio patch/loop recorder for further evaluation of the patient's etiology of most recent infarct.  Consider further evaluation by cardiology to manage hypertension as well  - Continue to follow with PCP in regards to managing patient's type 2 diabetes mellitus.    - For ongoing stroke prevention continue: Aspirin 81 mg once daily, atorvastatin 20 mg once daily  - Discussed the importance of antiplatelet/anticoagulation management with the patient to prevent future strokes.   - Recommend to check blood pressure occasionally away from the doctor's office to make sure that those numbers are typically less than 130/80.  If they are frequently higher than that, we recommend checking a little more often and to follow up with primary care team   - Will defer to primary care team for monitoring of cholesterol panel and blood sugar numbers with target LDL cholesterol of less than 70 and hemoglobin A1c less than 7%  - Recommend following a low salt, mediterranean diet   - Recommend routine physical exercise as tolerated

## 2025-03-19 NOTE — PATIENT INSTRUCTIONS
- Advised patient to continue to follow with current cardiology referral at this point in time.  Advised the patient that we would likely require additional monitoring such as Zio patch/loop recorder for further evaluation of the patient's etiology of most recent infarct.  Consider further evaluation by cardiology to manage hypertension as well  - Continue to follow with PCP in regards to managing patient's type 2 diabetes mellitus.    - For ongoing stroke prevention continue: Aspirin 81 mg once daily, atorvastatin 20 mg once daily  - Discussed the importance of antiplatelet/anticoagulation management with the patient to prevent future strokes.   - Recommend to check blood pressure occasionally away from the doctor's office to make sure that those numbers are typically less than 130/80.  If they are frequently higher than that, we recommend checking a little more often and to follow up with primary care team   - Will defer to primary care team for monitoring of cholesterol panel and blood sugar numbers with target LDL cholesterol of less than 70 and hemoglobin A1c less than 7%  - Recommend following a low salt, mediterranean diet   - Recommend routine physical exercise as tolerated     We will plan for him to return to the office in 6 months time to see on of the APPs or Dr. Petersen but would be happy to see him sooner if the need should arise.  If he has any symptoms concerning for TIA or stroke including sudden painless loss of vision or double vision, difficulty speaking or swallowing, vertigo/room spinning that does not quickly resolve, or weakness/numbness/loss of coordination affecting 1 side of the face or body he should proceed by ambulance to the nearest emergency room immediately.

## 2025-03-19 NOTE — ASSESSMENT & PLAN NOTE
- Most recent LDL 57 mg/dL  - Continue atorvastatin 20 mg daily  - Follow-up with PCP in regards to further monitoring of lipid panel in the future

## 2025-03-19 NOTE — PROGRESS NOTES
Name: Tommie Taylor      : 1965      MRN: [id number]  Encounter Provider: Harshad Romo PA-C  Encounter Date: 3/19/2025   Encounter department: [facility]  :  Assessment & Plan  History of CVA (cerebrovascular accident)  I had the pleasure of seeing Tommie today in the office at [facility] in [place].  He is presenting today for an office visit follow-up/hospital follow-up appointment in regard to his most recent history of CVA.  It was noted since his last visit in the office on 2025 the patient presented to the hospital on 02/10/2025 with strokelike symptoms.  Was noted that the patient was having some right-sided facial droop, noted to have nausea and dizziness as well.  The patient was properly evaluated and for acute stroke and did not show the patient had any acute stroke at this time.  was determined that this was likely in the setting of stroke or acute accidents from previous left corona radiata infarct.  The patient did not receive any TNK due to nondisabling symptoms.  No thrombectomy due to no LVO as well.  The patient was recommended to continue aspirin 81 mg daily and atorvastatin 20 mg daily on discharge.  He was expected to follow-up outpatient with neurology for further evaluation.    Since his hospitalization, patient has not noted any new strokelike symptoms.  He still having some right arm weakness, and slight noticeable right facial droop on examination.  The patient is currently taking aspirin 81 mg daily for secondary stroke prevention and also taking atorvastatin 20 mg daily.  /88 today in the office, advised patient to continue to follow with PCP in regards to blood pressure medication management.  Should also require a blood pressure cuff at home.  Most recent LDL was 57 mg/dL and most recent hemoglobin A1c 6.7%.  Patient did have a Zio patch from cardiology although it does not appear that was recently  read.  Has been doing well with trying to stay active and going to physical therapy.  Patient has also been trying to work on improving his diet as well.  Still following with CPAP compliance for DAISY no other questions or concerns.  Patient can follow-up in approximately 6 months time with Ananth MARTINEZ.    - Advised patient to continue to follow with current cardiology referral at this point in time.  Advised the patient that we would likely require additional monitoring such as Zio patch/loop recorder for further evaluation of the patient's etiology of most recent infarct.  Consider further evaluation by cardiology to manage hypertension as well  - Continue to follow with PCP in regards to managing patient's type 2 diabetes mellitus.    - For ongoing stroke prevention continue: Aspirin 81 mg once daily, atorvastatin 20 mg once daily  - Discussed the importance of antiplatelet/anticoagulation management with the patient to prevent future strokes.   - Recommend to check blood pressure occasionally away from the doctor's office to make sure that those numbers are typically less than 130/80.  If they are frequently higher than that, we recommend checking a little more often and to follow up with primary care team   - Will defer to primary care team for monitoring of cholesterol panel and blood sugar numbers with target LDL cholesterol of less than 70 and hemoglobin A1c less than 7%  - Recommend following a low salt, mediterranean diet   - Recommend routine physical exercise as tolerated        Weakness of right arm  - Continue with PT and OT for residual stroke deficit of right upper extremity weakness at this time        Primary hypertension  - Continue atenolol 25 mg daily and nifedipine 90 mg daily for antihypertensive therapy  - BP today in the office 152/88, would encouraged patient to acquire a blood pressure cuff and start monitoring blood pressure at home.  Ultimately would aim for blood pressure of less than  130/80 for secondary stroke prevention       Type 2 diabetes mellitus with stage 4 chronic kidney disease and hypertension (HCC)    Lab Results   Component Value Date    HGBA1C 6.7 (H) 02/10/2025     - Continue to follow with PCP in regards to further monitoring and management of hemoglobin A1c and further management of diabetes mellitus       DAISY (obstructive sleep apnea)  - Continue with CPAP compliance for DAISY and continue to follow with sleep medicine        Mixed hyperlipidemia  - Most recent LDL 57 mg/dL  - Continue atorvastatin 20 mg daily  - Follow-up with PCP in regards to further monitoring of lipid panel in the future         Patient Instructions     - Advised patient to continue to follow with current cardiology referral at this point in time.  Advised the patient that we would likely require additional monitoring such as Zio patch/loop recorder for further evaluation of the patient's etiology of most recent infarct.  Consider further evaluation by cardiology to manage hypertension as well  - Continue to follow with PCP in regards to managing patient's type 2 diabetes mellitus.    - For ongoing stroke prevention continue: Aspirin 81 mg once daily, atorvastatin 20 mg once daily  - Discussed the importance of antiplatelet/anticoagulation management with the patient to prevent future strokes.   - Recommend to check blood pressure occasionally away from the doctor's office to make sure that those numbers are typically less than 130/80.  If they are frequently higher than that, we recommend checking a little more often and to follow up with primary care team   - Will defer to primary care team for monitoring of cholesterol panel and blood sugar numbers with target LDL cholesterol of less than 70 and hemoglobin A1c less than 7%  - Recommend following a low salt, mediterranean diet   - Recommend routine physical exercise as tolerated     We will plan for him to return to the office in 6 months time to see on of  the APPs or Dr. Petersen but would be happy to see him sooner if the need should arise.  If he has any symptoms concerning for TIA or stroke including sudden painless loss of vision or double vision, difficulty speaking or swallowing, vertigo/room spinning that does not quickly resolve, or weakness/numbness/loss of coordination affecting 1 side of the face or body he should proceed by ambulance to the nearest emergency room immediately.      History of Present Illness   HPI     For Review/Hospital Course:    The patient was last seen in the office on 01/06/2025 by myself.  At the time the last visit the patient was presenting for left frontal periventricular/corona radiata infarct.  At this time patient was reporting no new strokelike symptoms.  Still had weakness of the right upper extremity which continues to improve along with some slurred speech.  It is noted that the patient had been receiving physical therapy at acute rehabilitation for at [facility].  He did not have any outpatient therapy and placed ambulatory referral for physical therapy moving forward.  Also placed referral for occupational therapy as well.  Patient was recommended at the last appointment to continue aspirin 81 mg daily and atorvastatin 20 mg daily for secondary stroke prevention.  Blood pressure at the last appointment was 158/84.  Most recent LDL was 90 mg/dL and most recent A1c was 8.6%.  Encouraged patient to follow-up with cardiology in regards to receiving Zio patch or loop recorder to rule out potential cardioembolic event.  Noted patient's primary care providers continuing to monitor diabetes.  He is eating relatively healthy and working on trying to stay active as much as he can.  He does have sleep apnea and has appointment to see sleep medicine specialist, he does use CPAP for DAISY.  Quit smoking about 40 to 50 years ago as previously noted.    Since the last visit he presented to the ED on 02/10/2025 for strokelike  symptoms.  It was reported that the patient was having some concerns of right facial droop, lightheadedness and nauseousness.  Patient had a CT head without contrast which showed no acute bleed or large territorial infarct.  CTA head and neck did not show any signs of LVO but severe stenosis at bilateral P2 segment.  Patient did not receive TNK due to the risk being higher than the benefit.  It seems likely that the patient was suffering from stroke recrudescence from being anemic and having hypokalemia.  MRI brain was negative for any acute infarct.  The patient was to remain on aspirin 81 mg daily and atorvastatin 20 mg daily at discharge.  Patient was recommended to follow-up with neurology on outpatient basis.    Interval History:    New stroke symptoms/residual symptoms:    Any new, sudden onset weakness, numbness, facial droop, slurred speech, difficulty speaking, trouble swallowing, persistent vertigo, or sudden double vision or vision loss? No new stroke like symptoms    Residual symptoms include: weakness of the right UE/LE: upper extremity    Stroke Etiology and Risk Factor modification:    This was a(n) lacunar stroke, most likely related to Small Vessel/microvascular    Stroke risk factors were evaluated including: Hyperlipidemia, hypertension, type 2 diabetes mellitus, DAISY     AP/AC therapy: aspirin 81 mg once daily. No bleeding or bruising issues.     Statin therapy: atorvastatin 20 mg once daily     Blood pressure today and as of late: 158/86 BP today in the office. His blood pressure has been much lower as of late.     Most recent LDL: 57 mg/dL    Most recent hemoglobin A1C: 6.7%    Cardiology evaluation? Any cardiac monitoring required?: did have Zio patch placed by cardiology, do not see the read results     Endocrinology evaluation? Following proper glycemic treatment/diet?: None, following with his PCP currently     Vascular surgery evaluation? Monitoring of carotid artery ultrasound required?  None    Lifestyle history/modifications:    Diet/Exercise regimen: Has been trying to improve his diet since the stroke had occurred. Has been trying to improve activity and walking at home as well.     Any physical therapy, occupational therapy or speech therapy performed/required at this time?:Currently following with both PT and OT for right sided weakness     Any difficulty with sleeping? Any history of sleep apnea? CPAP compliance?:No issues with his sleep, has been wearing CPAP for DAISY    Post-stroke depression/anxiety? No depression or anxiety noted at this time    Any history of smoking or tobacco usage?: Quit smoking 40-50 years ago      Review of Systems I have personally reviewed the MA's review of systems and made changes as necessary.    Medical History Reviewed by provider this encounter:     .  Past Medical History   Past Medical History:   Diagnosis Date    ADHD, adult residual type     Anxiety     Anxiety     Bipolar 1 disorder (HCC)     Cataract     Left eye    Chronic kidney disease     Colon polyp     CPAP (continuous positive airway pressure) dependence     Depression     Depression     Diabetes mellitus (HCC)     blood sugar 167 on admission    Equinus deformity of foot     GERD (gastroesophageal reflux disease)     Homicidal ideations     Hyperlipidemia     Hypertension     Microalbuminuria     Morbid obesity (HCC)     Neuropathy     Shortness of breath     Sleep apnea     CPAP at bedtime    Sleep apnea     Stroke (HCC)     Suicidal intent      Past Surgical History:   Procedure Laterality Date    CATARACT EXTRACTION      CATARACT EXTRACTION      COLONOSCOPY      HAND SURGERY Right     OTHER SURGICAL HISTORY      REPAIR OF SUPERFICIAL WOUND FACE    ND ESOPHAGOGASTRODUODENOSCOPY TRANSORAL DIAGNOSTIC N/A 06/14/2018    Procedure: ESOPHAGOGASTRODUODENOSCOPY (EGD);  Surgeon: Yinka Maier MD;  Location: [facility];  Service: Gastroenterology    ND ESOPHAGOGASTRODUODENOSCOPY TRANSORAL DIAGNOSTIC N/A  09/12/2018    Procedure: EGD AND COLONOSCOPY;  Surgeon: Yinka Maier MD;  Location: [facility];  Service: Gastroenterology     Family History   Problem Relation Age of Onset    Diabetes Mother     Alcohol abuse Father     Diabetes Father     Hypertension Father     Lung cancer Father     Stroke Father     Cancer Father         lung    Alcohol abuse Sister     Depression Sister     Lymphoma Sister     Alcohol abuse Family     Alcohol abuse Sister     Alcohol abuse Sister     Cancer Sister     Heart disease Neg Hx     Thyroid disease Neg Hx       reports that he quit smoking about 40 years ago. His smoking use included cigarettes. His smokeless tobacco use includes chew. He reports that he does not currently use alcohol. He reports that he does not currently use drugs.  Current Outpatient Medications   Medication Instructions    acetaminophen (TYLENOL) 650 mg, Oral, Every 6 hours PRN    ammonium lactate (LAC-HYDRIN) 12 % cream Topical, As needed    aspirin (ECOTRIN LOW STRENGTH) 81 mg, Oral, Daily    atenolol (TENORMIN) 25 mg, Oral, Daily    atorvastatin (LIPITOR) 20 mg, Oral, Daily    Blood Glucose Monitoring Suppl (Assure 3 Meter) KIT 1 each, Does not apply, 4 times daily, Fine to substitute other brand if not covered by insurance    Blood Glucose Monitoring Suppl (FreeStyle Lite) w/Device KIT Use as instructed 4 times a day    calcium carbonate (TUMS) 1,000 mg, Oral, 2 times daily PRN    Cholecalciferol (VITAMIN D3) 2,000 Units, Oral, Daily    ergocalciferol (ERGOCALCIFEROL) 50,000 Units, Oral, Weekly    glucose blood (FREESTYLE LITE) test strip CHECK BLOOD SUGARS FOUR TIMES DAILY    insulin NPH-insulin regular (NovoLIN 70/30 ReliOn) 100 units/mL subcutaneous injection Inject 20 Units under the skin daily before breakfast AND 10 Units in the evening. Take before meals. Use 20 units with breakfast and 10 units with dinner.    Insulin Pen Needle 31G X 8 MM MISC Check sugars three times a day    Lancets (freestyle)  lancets Use as instructed    magnesium gluconate (MAGONATE) 500 mg, Oral, Every other day    NIFEdipine (ADALAT CC) 90 mg, Oral, Daily    omeprazole (PRILOSEC) 20 mg, Daily     Allergies   Allergen Reactions    Pollen Extract Nasal Congestion    Tetanus Toxoid Swelling      Current Outpatient Medications on File Prior to Visit   Medication Sig Dispense Refill    acetaminophen (TYLENOL) 325 mg tablet Take 2 tablets (650 mg total) by mouth every 6 (six) hours as needed for mild pain, fever or headaches      ammonium lactate (LAC-HYDRIN) 12 % cream Apply topically as needed for dry skin 385 g 0    aspirin 81 mg EC tablet Take 1 tablet (81 mg total) by mouth daily 30 tablet 2    atenolol (TENORMIN) 25 mg tablet Take 1 tablet (25 mg total) by mouth daily 90 tablet 3    atorvastatin (LIPITOR) 20 mg tablet Take 1 tablet (20 mg total) by mouth daily 90 tablet 3    Blood Glucose Monitoring Suppl (FreeStyle Lite) w/Device KIT Use as instructed 4 times a day 1 kit 0    calcium carbonate (TUMS) 500 mg chewable tablet Chew 2 tablets (1,000 mg total) 2 (two) times a day as needed for indigestion or heartburn      Cholecalciferol (VITAMIN D3) 1,000 units tablet Take 2 tablets (2,000 Units total) by mouth daily 180 tablet 0    ergocalciferol (ERGOCALCIFEROL) 1.25 MG (89010 UT) capsule Take 1 capsule (50,000 Units total) by mouth once a week 12 capsule 0    glucose blood (FREESTYLE LITE) test strip CHECK BLOOD SUGARS FOUR TIMES DAILY 400 strip 3    magnesium gluconate (MAGONATE) 500 MG tablet Take 1 tablet (500 mg total) by mouth every other day 90 tablet 1    NIFEdipine (ADALAT CC) 90 mg 24 hr tablet Take 1 tablet (90 mg total) by mouth daily 90 tablet 1    omeprazole (PriLOSEC) 20 mg delayed release capsule Take 20 mg by mouth daily      Blood Glucose Monitoring Suppl (Assure 3 Meter) KIT Use 1 each 4 (four) times a day Fine to substitute other brand if not covered by insurance (Patient not taking: Reported on 2/12/2025) 1 kit 2  "   insulin NPH-insulin regular (NovoLIN 70/30 ReliOn) 100 units/mL subcutaneous injection Inject 20 Units under the skin daily before breakfast AND 10 Units in the evening. Take before meals. Use 20 units with breakfast and 10 units with dinner. (Patient not taking: Reported on 3/18/2025) 10 mL 6    Insulin Pen Needle 31G X 8 MM MISC Check sugars three times a day (Patient not taking: Reported on 2025) 100 each 1    Lancets (freestyle) lancets Use as instructed (Patient not taking: Reported on 2025) 300 each 0     No current facility-administered medications on file prior to visit.      Social History     Tobacco Use    Smoking status: Former     Current packs/day: 0.00     Types: Cigarettes     Quit date:      Years since quittin.2    Smokeless tobacco: Current     Types: Chew    Tobacco comments:     pt \"Quit after approx over 25 years ago\"   Vaping Use    Vaping status: Never Used   Substance and Sexual Activity    Alcohol use: Not Currently     Comment: rarely    Drug use: Not Currently     Comment: quit 20 years ago    Sexual activity: Yes     Partners: Female     Birth control/protection: None     Comment: steady girlfriend        Objective   /88 (BP Location: Left arm, Patient Position: Sitting)   Pulse 63   Temp 97.9 °F (36.6 °C) (Temporal)   Wt 117 kg (258 lb 14.4 oz)   SpO2 98%   BMI 41.79 kg/m²     Physical Exam  Neurological Exam    Physical Exam:                                                                 Vitals:            Constitutional:    /88 (BP Location: Left arm, Patient Position: Sitting)   Pulse 63   Temp 97.9 °F (36.6 °C) (Temporal)   Wt 117 kg (258 lb 14.4 oz)   SpO2 98%   BMI 41.79 kg/m²   BP Readings from Last 3 Encounters:   25 152/88   25 124/66   03/10/25 117/72     Pulse Readings from Last 3 Encounters:   25 63   25 74   03/10/25 77         Well developed, well nourished, well groomed. No dysmorphic features.   "     Psychiatric:  Normal behavior and appropriate affect        Neurological Examination:     Mental status/cognitive function:   Orientated to time, place and person. Recent and remote memory intact. Attention span and concentration as well as fund of knowledge are appropriate for age. Normal language and spontaneous speech.    Cranial Nerves:  II-visual fields full.   III, IV, VI-Pupils were equal, round, and reactive to light and accomodation. Extraocular movements were full and conjugate without nystagmus. Conjugate gaze, normal smooth pursuits, normal saccades   V-facial sensation symmetric.    VII-facial expression symmetric, intact forehead wrinkle, strong eye closure, right facial droop noted  VIII-hearing grossly intact bilaterally   IX, X-palate elevation symmetric, mild dysarthria (improved)  XI-shoulder shrug strength intact    XII-tongue protrusion midline.    Motor Exam: symmetric bulk and tone throughout, no pronator drift. Power/strength 5/5 of the left upper and lower extremities, power/strength 5/5 of the right lower extremity (improved), power/strength 3/5 of the right upper extremity.  Decreased  strength of right hand compared to left hand.  Sensory: grossly intact light touch in all extremities.   Reflexes: brachioradialis 1+, biceps 1+, knee 1+ bilaterally  Coordination: Finger nose finger intact on the left side, unable to perform on right side  Gait: Currently ambulates with cane.    Results/Data:    CTA stroke alert, 02/10/2025:    IMPRESSION:     Negative CTA head and neck for large vessel occlusion, dissection, or aneurysm.     Severe focal stenosis in bilateral PCA P2 segments likely due to atherosclerotic disease, worsened since 10/14/2024.     Additional chronic/incidental findings as detailed above.    MRI brain without contrast, 02/11/2025:    IMPRESSION:     Evolving left corona radiata infarct with near total resolution of the previously noted diffusion restriction. There is  now associated gliosis. There is no new territorial type infarct.     Mild chronic microangiopathic ischemic changes.    TTE, 02/11/2025:      Left Ventricle: Left ventricular cavity size is normal. Wall thickness is moderately increased. There is moderate concentric hypertrophy. The left ventricular ejection fraction is 60%. Systolic function is normal. Wall motion is normal. Diastolic function is mildly abnormal, consistent with grade I (abnormal) relaxation. There is outflow tract dynamic obstruction with valsalva with a peak gradient of 36.0 mmHg.    IVS: There is sigmoid appearance of the septum.    Atrial Septum: No patent foramen ovale detected, confirmed at rest using agitated saline contrast, confirmed by provocation with cough, using agitated saline contrast and with valsalva, using agitated saline contrast.    Aortic Valve: There is mild regurgitation. There is aortic valve sclerosis without significant stenosis.    Mitral Valve: There is mild regurgitation.    Pericardium: The pericardium is mildly thickened.    Prior TTE study available for comparison. Prior study date: 10/15/2024. No significant changes noted compared to the prior study.     No patent foramen ovale detected at rest, with cough, or with valsalva, using agitated saline contrast.     Radiology Results Review: I have reviewed radiology reports from 02/10/2025, 02/11/2025 including: CT head, MRI brain, and Echocardiogram.    Administrative Statements   I have spent a total time of 30 minutes in caring for this patient on the day of the visit/encounter including Diagnostic results, Risks and benefits of tx options, Instructions for management, Patient and family education, Importance of tx compliance, Risk factor reductions, Impressions, Counseling / Coordination of care, Documenting in the medical record, Reviewing/placing orders in the medical record (including tests, medications, and/or procedures), and Obtaining or reviewing history  .

## 2025-03-19 NOTE — PROGRESS NOTES
Review of Systems   Constitutional:  Negative for appetite change, fatigue and fever.   HENT: Negative.  Negative for hearing loss, tinnitus, trouble swallowing and voice change.    Eyes:  Positive for visual disturbance (blurry vision). Negative for photophobia and pain.   Respiratory: Negative.  Negative for shortness of breath.    Cardiovascular: Negative.  Negative for palpitations.   Gastrointestinal: Negative.  Negative for nausea and vomiting.   Endocrine: Negative.  Negative for cold intolerance.   Genitourinary: Negative.  Negative for dysuria, frequency and urgency.   Musculoskeletal:  Positive for gait problem (uses cane). Negative for back pain, myalgias, neck pain and neck stiffness.   Skin: Negative.  Negative for rash.   Allergic/Immunologic: Negative.    Neurological:  Positive for weakness (right arm) and numbness (left hand more). Negative for dizziness, tremors, seizures, syncope, facial asymmetry, speech difficulty, light-headedness and headaches.   Hematological: Negative.  Does not bruise/bleed easily.   Psychiatric/Behavioral: Negative.  Negative for confusion, hallucinations and sleep disturbance.    All other systems reviewed and are negative.

## 2025-03-19 NOTE — PROGRESS NOTES
"Occupational Therapy Daily Note:    Today's date: 3/19/2025  Patient name: Tommie Taylor  : 1965  MRN: [id number]  Referring provider: Harshad Romo*  Dx:   Encounter Diagnosis   Name Primary?    History of CVA (cerebrovascular accident) Yes                POC expires Unit limit Auth Expiration date PT/OT/ST + Visit Limit?   3/27/25 N/A 25 BOMN                           Visit/Unit Tracking  AUTH Status:  Date  2 PN 3/5 3/12 3/19   BOMN Used 1 2 3 4 5 6 7 1 2 3    Remaining  7 6 5 4 3 2 1 7 6   5        Duration in weeks: 8 weeks  Plan of care beginning date: 25  Plan of care expiration date: 3/27/25    Re-eval: 3/27/25    Precautions: CVA, HTN, high BP, Orthostatic       Subjective: \"Are we really going to continue into April\"    Objective: See treatment below.     There act: Pt seated EOM with focus on incorporation of use of BUE with ADL buttoning task. Pt using felt pieces and button to work through half the stack of pieces. Pt with good use of R hand as stabilization and slight manipulation of button with thumb and D1. Occasional use of body for assist, however overall good dependent stabilization with R arm/hand. Pt doffing and donning sweatshirt and zipper using BUE reporting to complete daily.    Thera Ex:  With 3# weighted dowel, pt completing chest press 20x with moderate rest breaks needed after 3-4 reps. Pt with difficulty sustaining shoulder FF/Abd at 90*.    Seated EOM, pt using R hand to abd to reach and grasp foam cylinders from basket on R side and then place onto wooden dowels anterior on table. Pt able to place two foam pieces, then therapist tilting dowels for increased success. Pt with noted RUE fatigue, transitioning to completion of fxl reaching to low surface with cues for elbow extension when reaching to decreased compensation.  Pt then removing in same fashion with minimal drops.     Assessment: Tolerated treatment well. Patient " would benefit from continued skilled OT. Pt demo G challenge with shoulder AROM. Pt also demo G use of R hand as dependant stabilizer throughout ADL simulation tasks.     Plan: Continued skilled OT per POC

## 2025-03-20 NOTE — PROGRESS NOTES
Daily Note     Today's date: 3/21/2025  Patient name: Tommie Taylor  : 1965  MRN: [id number]  Referring provider: Harshad Romo*  Dx:   Encounter Diagnosis     ICD-10-CM    1. History of CVA (cerebrovascular accident)  Z86.73       2. Weakness of right arm  R29.898           Start Time: 1145  Stop Time: 1230  Total time in clinic (min): 45 minutes    Subjective: I walked a block the other day       Objective: See treatment diary below      Assessment: Pt tolerated the treatment well. Pt performed walking on the treadmill to promote cardiovascular endurance/neural priming for the session. Pt was challenged with weight shifting on rocker board this session. Performed well laterally, but had trouble with A/P weight shifting with poor posterior weight shift. Pt demonstrated fatigue post-session. Pt would benefit from further skilled PT sessions to address deficits in endurance, strength, balance, activity tolerance, and overall safety needed to maximize the pt's function and quality of life.       Plan: Continue per plan of care.  Progress treatment as tolerated.       Long Term Goal Expiration Date: (2025)  POC Expiration Date: (2025)    Visit count: 4 of 10; PN due 2025    POC expires Unit limit Auth Expiration date PT/OT/ST + Visit Limit?   25 6 PT/OT 25 $2410 CAP/BOMN   3/28/25 6 PT/OT 25 $2410 CAP/BOMN                     Visit/Unit Tracking  AUTH Status:  Date 01/08 01/13 01/20 01/31 02/03 02/07  PN   PN 3/7   Every 10 visits  Used 1 2 3 4 5 6 1 2 3 4 1    Remaining  9 8 7 6 5 4 9 8 7 6 9      AUTH Status:  Date 3/12 3/18 3/21           Every 10 visits  Used 2 3 4            Remaining  8 7 6                Precautions: Hx of stroke, HTN, orthostatic hypotension, T2DM, fall risk       Manuals                                    Neuro Re-Ed         Pt education      Pt education on FGA, mCTSIB, TUG, and their  "relation to fall risk    HIGT Solo  1.3 mph   0% incline   7.5# AW on R  X 8 min     Neural priming  Solo  1.5 mph   0% incline   7.5# AW on R  X 8 min     Neural priming Solo  1.5 mph   0% incline   5# AW b/l  X 10 min     Neural priming Solo  1.7 mph   0% incline   7.5# AW on R  X 10 min     Neural priming    STS With Bolster with 9# AW hooked on   2 x 10  X 10  No UE X 5   No UE     Hurdles  Solo  1/4 track   7.5# AW on R   (6) pool noodles   Fwd x 4 laps (reciprocal)   Lat x 3 laps  Solo  1/4 track   7.5# AW on R   (6) 8\" yellow hurdles   Fwd x 2 laps  Lat x 2 laps      Blazepods  Solo  1/4 track   7.5# AW on R  4 pods   1:30 min rounds   Fwd/bwd walking   4# AW on bolster   Round 1: 12 hits   Round 2: 12 hits     Solo  1/4 track   7.5# AW on R   (3) airex  by (1) 8\" step and (1) 6\" step  4 pods scattered   Fwd   1:30 min round   Round 1: 6 hits  Round 2: 5 hits Solo  1/4 track   5# AW b/l   Small/med river rocks under red mat   Quick turning   1 min rounds   4 pods   Round 1: 5 hits   Round 2: 5 hits    Solo  1/4 track   Obstacle course with (3) airex, rocker board, (1) 8\" step, and (1) 6\" step   4 pods   1:30 min round   Round 1: 4 hits   Round 2: 4 hits      Step-ups   Solo  8\" step   5# AW b/l   Lat x 6 each leg   Fwd x 10 each leg  Solo  8\" step   5# AW on R   Lat x 10 each leg    Dynamic Balance Solo  1/4 track   Bolster with 4# AW  7.5# AW on R  Fwd/bwd walking   X 3 laps   VC to step past each foot   Solo  1/4 track   Obstacle course   (3) 6\" hurdles  by (3) airex, and small/med river rocks   Fwd x 3 laps  Solo  1/4 track   Obstacle course with (3) airex, rocker board, (1) 8\" step, and (1) 6\" step   Fwd x 1 lap    Rocker board   Lat 2 x 10 each side   A/P (attempted, but unable to shift posteriorly) Solo  1/4 track   Bolster with 4# AW  Fwd/bwd walking   X 2 laps    Resisted walking      Hallway (100ft)   Purple TB   5# AW on R   X 2 laps     Solo  1/2 track   Purple TB   5# AW on R   X " 2 laps      Agility ladder        Stair navigation     Solo  1/4 track with stairs underneath   7.5# AW on R    No UE   Step-to pattern x 1 lap   1 UE   Reciprocal pattern   X 5 laps     Velcro cards Solo  Airex   X 10 cards   Head turns   X 5 min       Ther Ex        Pt education      Pt education on 6MWT, 5xSTS, MMT                                                            Ther Activity                        Gait Training                        Modalities

## 2025-03-21 ENCOUNTER — OFFICE VISIT (OUTPATIENT)
Dept: PHYSICAL THERAPY | Facility: CLINIC | Age: 60
End: 2025-03-21
Payer: MEDICARE

## 2025-03-21 ENCOUNTER — OFFICE VISIT (OUTPATIENT)
Dept: OCCUPATIONAL THERAPY | Facility: CLINIC | Age: 60
End: 2025-03-21
Payer: MEDICARE

## 2025-03-21 DIAGNOSIS — Z86.73 HISTORY OF CVA (CEREBROVASCULAR ACCIDENT): Primary | ICD-10-CM

## 2025-03-21 DIAGNOSIS — R29.898 WEAKNESS OF RIGHT ARM: ICD-10-CM

## 2025-03-21 PROCEDURE — 97112 NEUROMUSCULAR REEDUCATION: CPT

## 2025-03-21 NOTE — PROGRESS NOTES
"Occupational Therapy Daily Note:    Today's date: 3/21/2025  Patient name: Tommie Taylor  : 1965  MRN: [id number]  Referring provider: Harshad Romo*  Dx:   Encounter Diagnosis   Name Primary?    History of CVA (cerebrovascular accident) Yes                  POC expires Unit limit Auth Expiration date PT/OT/ST + Visit Limit?   3/27/25 N/A 25 BOMN                           Visit/Unit Tracking  AUTH Status:  Date  2/ PN 3/5 3/12 3/19   BOMN Used 1 2 3 4 5 6 7 1 2 3    Remaining  7 6 5 4 3 2 1 7 6   5      Visit/Unit Tracking  AUTH Status:  Date 3/21            BOMN Used 4             Remaining  6              Duration in weeks: 8 weeks  Plan of care beginning date: 25  Plan of care expiration date: 3/27/25    Re-eval: 3/27/25    Precautions: CVA, HTN, high BP, Orthostatic       Subjective: \"Are we really going to continue into April\"    Objective: See treatment below.     Neuro re-ed: Pt supine on mat with air cast donned on R elbow and forearm for sustained elbow extension. Pt completing FF 30x with 3# wt dowel. Once completed then transitioned to completing 10x FF with added resistance with green theraband from therapist in both ext and flexion. Pt with increased difficulty with resistance into flexion however able to tolerate into extension. Pt then completed 7x same as above with resistance from therapist seated EOM with FF to 90*    Pt then seated EOM, grasping bean bag from mat with L hand, reaching around back to grab with R hand and then reach down and place into bucket on lower surface with focus on elbow extension. Pt with significant compensation throughout for shoulder extension when reaching behind back.    Assessment: Tolerated treatment well. Patient would benefit from continued skilled OT. Pt demo G challenge with shoulder AROM.     Plan: Continued skilled OT per POC    "

## 2025-03-24 ENCOUNTER — PATIENT OUTREACH (OUTPATIENT)
Dept: INTERNAL MEDICINE CLINIC | Facility: CLINIC | Age: 60
End: 2025-03-24

## 2025-03-24 NOTE — PROGRESS NOTES
Pt due for outreach.  Outreach to patient and S.O. Pt reports that he has already completed the interview for Mahin Hopper.    Pt reports he is going to his appointments as directed.     Pt denies any additional questions or concerns at this time. Pt recorded RN CM contact number if any additional needs arise.     In basket message sent to CMOC to discuss any additional needs. If no additional needs identified by CMOC, RN CM will close episode.   
Statement Selected

## 2025-03-25 NOTE — PROGRESS NOTES
Discharge Note     Today's date: 3/26/2025  Patient name: Tommie Taylor  : 1965  MRN: [id number]  Referring provider: Harshad Romo*  Dx:   Encounter Diagnosis     ICD-10-CM    1. History of CVA (cerebrovascular accident)  Z86.73       2. Weakness of right arm  R29.898           Start Time: 1215  Stop Time: 1300  Total time in clinic (min): 45 minutes    Assessment: Pt has attended 5 skilled maintenance physical therapy sessions since last progress update and is demonstrating progress towards goals.  Pt has met 5/5 maintenance goals at this time. Pt has maintained their 6mwt distance from 1200 feet to 1120 ft indicating maintained aerobic capacity and activity tolerance. Pts gait speed has maintained above 1.0 m/s indicating pt is a comunity ambulator and at a decreased risk for falls based on their speed being > 1 m/s. Pts TUG time has been maintained indicating pt is at a decreased risk for falls based on this test as socres > 13.5 s are considered at risk. Pt's FGA score has been maintained at 27/30 reducing pts fall risk as scores < 23 are considered at risk for falls and indicating maintained dynamic balance. Also maintained static balance with no sway in all conditions of the mCTSIB. Pt's 5x STS tests has maintained indicating maintained LE strength and power. Due to maintaining level of function as well as patient wishes of discharging to a home program, will discharge this date. Pt extensively educated on the importance of a daily aerobic exercise routine for further stroke prevention and maintenance of endurance as well as other exercises to maintain balance and overall strength. Pt provided with written HEP. Pt demonstrated good exercise technique and understanding. Will discharge at this time. Pt aware that he can reach out at any time with any further questions or concerns. Pt encouraged to get a new script from his doctor and return to skilled PT if he notices any changes in  function that warrants skilled care in the future. Pt verbalized understanding.     NEW SKILLED MAINTENANCE GOALS (2/28/25):  - Patient will maintain level of endurance and aerobic capacity with a 6MWT between 1,000-1,200 ft within 10 visits MET  - Patient will maintain level of LE strength and power with a 5xSTS between 10-12s without UE support within 10 visits MET  - Patient will maintain decreased fall risk with a gait speed between 1.0-1.2 m/s within 10 visits. MET   - Patient will maintain level of dynamic balance with an FGA score between 25-27/30 within 10 visits. MET  - Patient will maintain level of static balance with ability to perform mCTSIB in all conditions for 30 sec within 10 visits. MET     Plan  Patient would benefit from: comprehensive HEP  Referral necessary: No    Treatment plan discussed with: patient     Subjective:    Patient pain: 0/10  Patient goals: wants to start with home program, work on R arm     Objective: See treatment diary below      Balance Test 01/08 02/07 02/28 03/26   6 Minute Walk Test (ft): 300 ft, 2:10 left, no AD   HR: 75 bpm   O2: 99% 950 ft, no AD   HR: 79 bpm   O2: 99%  1200 ft, no AD  HR: 85 bpm  O2: 98% 1120 ft  HR: 83 bpm  O2: 99%   Gait Speed (ft/s): 0.81 m/s 0.93 m/s  1.43 m/s  1.27 m/s    5x Sit To Stand (s): 13s, No UE  9.75s, no UE  9.34s, no UE 9.31s, no UE   TUG 16.57s 8.50s 7.57s  8.22s    FGA 15/30 23/30 27/30 27/30        MCTSIB Number of Seconds (01/08) 02/07 02/28 03/26   Feet Together, Eyes Open 30 sec  30 sec 30 sec  30 sec    Feet Together, Eyes Closed 30 sec 30 sec 30 sec 30 sec   Feet Together, Eyes Open Foam 30 sec, mild sway 30 sec  30 sec 30 sec   Feet Together, Eyes Closed Foam 30 sec, mod sway 30 sec, mild sway  30 sec  30 sec      Manual Muscle Testing - Hip Right Left 02/07 02/28 03/26   Flexion 3/5 3+/5 R: 3+/5  L: 4/5 R; 3+/5  L: 4/5 R; 3+/5  L: 4+/5     Manual Muscle Testing - Knee Right Left  02/07 02/28 03/26   Flexion 4/5 5/5 R: 4/5  L:  5/5 R: 5/5  L: 5/5 R: 5/5  L: 5/5   Extension 4/5 5/5 R: 5/5  L: 5/5 R: 5/5  L: 5/5 R: 5/5  L: 5/5     Manual Muscle Testing - Ankle Right Left  02/07 02/28 03/26   Doriflexion 4/5 5/5 R: 4+/5  L: 5/5 R: 5/5  L: 5/5 R: 5/5  L: 5/5        Long Term Goal Expiration Date: (03/28/2025)  POC Expiration Date: (03/28/2025)    Visit count: 5 of 10; PN due 03/28/2025    POC expires Unit limit Auth Expiration date PT/OT/ST + Visit Limit?   03/05/25 6 PT/OT 12/31/25 $2410 CAP/BOMN   3/28/25 6 PT/OT 12/31/25 $2410 CAP/BOMN                     Visit/Unit Tracking  AUTH Status:  Date 01/08 01/13 01/20 01/31 02/03 02/07  PN 2/14 2/19 2/26 2/28  PN 3/7   Every 10 visits  Used 1 2 3 4 5 6 1 2 3 4 1    Remaining  9 8 7 6 5 4 9 8 7 6 9      AUTH Status:  Date 3/12 3/18 3/21 3/26  PN          Every 10 visits  Used 2 3 4 5           Remaining  8 7 6 5               Precautions: Hx of stroke, HTN, orthostatic hypotension, T2DM, fall risk     Access Code: [email or web address]  URL: [email or web address]  Date: 03/26/2025  Prepared by: Shana Ortiz    Exercises  - Step Up  - 1 x daily - 7 x weekly - 3 sets - 10 reps  - Lateral Step Up  - 1 x daily - 7 x weekly - 3 sets - 10 reps  - Sit to Stand with Arms Crossed  - 1 x daily - 7 x weekly - 3 sets - 10 reps  - Side Stepping with Counter Support  - 1 x daily - 7 x weekly - 3 sets - 10 reps  - Backward Walking with Counter Support  - 1 x daily - 7 x weekly - 3 sets - 10 reps  - Standing March with Counter Support  - 1 x daily - 7 x weekly - 3 sets - 10 reps    Manuals 03/07 03/12 03/18 03/21 3/26                                   Neuro Re-Ed         Pt education      Pt education on all neuro testing including FGA, mCTSIB, and TUG     Pt education on importance of daily aerobic activity for further stroke prevention and neuroplasticity   HIGT Solo  1.3 mph   0% incline   7.5# AW on R  X 8 min     Neural priming  Solo  1.5 mph   0% incline   7.5# AW on R  X 8 min     Neural priming Solo  1.5  "mph   0% incline   5# AW b/l  X 10 min     Neural priming Solo  1.7 mph   0% incline   7.5# AW on R  X 10 min     Neural priming Solo  1.7 mph   0% incline   7.5# AW on R  X 8 min     Neural priming    STS With Bolster with 9# AW hooked on   2 x 10  X 10  No UE X 5   No UE     Hurdles  Solo  1/4 track   7.5# AW on R   (6) pool noodles   Fwd x 4 laps (reciprocal)   Lat x 3 laps  Solo  1/4 track   7.5# AW on R   (6) 8\" yellow hurdles   Fwd x 2 laps  Lat x 2 laps      Blazepods  Solo  1/4 track   7.5# AW on R  4 pods   1:30 min rounds   Fwd/bwd walking   4# AW on bolster   Round 1: 12 hits   Round 2: 12 hits     Solo  1/4 track   7.5# AW on R   (3) airex  by (1) 8\" step and (1) 6\" step  4 pods scattered   Fwd   1:30 min round   Round 1: 6 hits  Round 2: 5 hits Solo  1/4 track   5# AW b/l   Small/med river rocks under red mat   Quick turning   1 min rounds   4 pods   Round 1: 5 hits   Round 2: 5 hits    Solo  1/4 track   Obstacle course with (3) airex, rocker board, (1) 8\" step, and (1) 6\" step   4 pods   1:30 min round   Round 1: 4 hits   Round 2: 4 hits      Step-ups   Solo  8\" step   5# AW b/l   Lat x 6 each leg   Fwd x 10 each leg     Dynamic Balance Solo  1/4 track   Bolster with 4# AW  7.5# AW on R  Fwd/bwd walking   X 3 laps   VC to step past each foot   Solo  1/4 track   Obstacle course   (3) 6\" hurdles  by (3) airex, and small/med river rocks   Fwd x 3 laps  Solo  1/4 track   Obstacle course with (3) airex, rocker board, (1) 8\" step, and (1) 6\" step   Fwd x 1 lap    Rocker board   Lat 2 x 10 each side   A/P (attempted, but unable to shift posteriorly)    Resisted walking         Agility ladder        Stair navigation     Solo  1/4 track with stairs underneath   7.5# AW on R    No UE   Step-to pattern x 1 lap   1 UE   Reciprocal pattern   X 5 laps     Velcro cards Solo  Airex   X 10 cards   Head turns   X 5 min       Ther Ex        Pt education      Pt education on all testing including 6MWT, " 5xSTS, and gait speed                                                            Ther Activity                        Gait Training                        Modalities

## 2025-03-26 ENCOUNTER — TELEPHONE (OUTPATIENT)
Age: 60
End: 2025-03-26

## 2025-03-26 ENCOUNTER — EVALUATION (OUTPATIENT)
Dept: PHYSICAL THERAPY | Facility: CLINIC | Age: 60
End: 2025-03-26
Payer: MEDICARE

## 2025-03-26 ENCOUNTER — EVALUATION (OUTPATIENT)
Dept: OCCUPATIONAL THERAPY | Facility: CLINIC | Age: 60
End: 2025-03-26
Payer: MEDICARE

## 2025-03-26 DIAGNOSIS — R29.898 WEAKNESS OF RIGHT ARM: ICD-10-CM

## 2025-03-26 DIAGNOSIS — Z86.73 HISTORY OF CVA (CEREBROVASCULAR ACCIDENT): Primary | ICD-10-CM

## 2025-03-26 PROCEDURE — 97168 OT RE-EVAL EST PLAN CARE: CPT

## 2025-03-26 PROCEDURE — 97112 NEUROMUSCULAR REEDUCATION: CPT

## 2025-03-26 PROCEDURE — 97110 THERAPEUTIC EXERCISES: CPT

## 2025-03-26 NOTE — TELEPHONE ENCOUNTER
Spoke to pt and his spouse regarding zio monitor. Pt stated he lost zio patch while in the hosp. Advised I would let Dr. Cotter know if he would like for pt to wear another one. Per Dr. Cotter another monitor wont be needed. Pt can f/u as scheduled and we will sort it out then. Pts spouse verbally understood.

## 2025-03-26 NOTE — PROGRESS NOTES
OCCUPATIONAL THERAPY RE-EVALUATION:    3/26/25  Tommie Taylor  1965  [id number]  Harshad Romo Glenn*   Diagnosis ICD-10-CM Associated Orders   1. History of CVA (cerebrovascular accident)  Z86.73                 Assessment/Plan    Skilled Analysis:  Tommie Taylor is a 59 y.o. male referred to Occupational Therapy s/p History of CVA (cerebrovascular accident) [Z86.73].   Pt participated in skilled OT re-evaluation and following formalized testing as well as clinical observation, Pt presents with the following areas of deficit:  RUE weakness, tone, decreased ability to complete ADLs, ROM, strength, and education. Therapist completing PROM this date with improvements in movement noted with to 90* FF and continued/remained ABD. Pt with continued tone noted in biceps, wrist flexors and digits. Pt with decreased pain noted this date with AROM, AAROM, and PROM. Pt reports that he has not completed much stretching or exercises at home, however has been able to complete more fxl tasks with use R hand as stabilizer and assist. He reports that most recently was able to  and carry laundry basket with both hands. He has also been provided with resistive sponge and red theraputty to complete squeezes at home which have been going well. Pt with noted improvements in proximal and distal ROM both extension and flexion. Pt has been using his R hand to complete zipping and dressing with no issues or assist needed. Pt reports that he has not been taking complete showers due to nervousness when in water, however does have a chair he could sit and use when he does. Recommended if he does try to have girlfriend with him for safety and assist as needed. Pt will benefit from skilled Occupational Therapy services 2x/week for 8 weeks with focus on neuro re-ed, there ex, there act, and HEP.  Tommie Taylor is in agreement with POC.    Todays treatment: Pt tolerated PROM to RUE while in supine with added pressure for  increased stretch and then also tolerated scap mobilizations to RUE while seated EOM. Pt then completed 10x shoulder shrugs and retraction with 5 sec holds at end range. Pt educated to complete at home as able.    POC expires Unit limit Auth Expiration date PT/OT/ST + Visit Limit?   3/27/25 N/A 12/31/25 BOMN                           Visit/Unit Tracking  AUTH Status:  Date 1/30 1/31 2/7 2/14 2/19 2/26 2/28 PN 3/5 3/12 3/19   BOMN Used 1 2 3 4 5 6 7 1 2 3    Remaining  7 6 5 4 3 2 1 7 6   5      Visit/Unit Tracking  AUTH Status:  Date 3/21 3/26 RE           BOMN Used 4 5            Remaining  4 3             Duration in weeks: 8 weeks  Plan of care beginning date: 3/26/25  Plan of care expiration date: 5 21/25    Re-eval: 4/26/25    Precautions: CVA, HTN, high BP, Orthostatic              GOALS    Short Term Goals (4 weeks)  Strength/Endurance  - Pt will demo with G tolerance to supine, and seated exercise x 10 minutes with minimal rest breaks required for increased engagement in life roles and weekly exercise regimen PROGRESSING   - Pt will demo with G understanding of HEP to improve functional progression towards goals in POC and for improved functional use of RUE PROGRESSING ; uses resistive sponge daily    AROM/PROM  - Pt will demo with decreased  RUE  shoulder sublux to trace for increased AROM for improved ADL and IADL engagement PROGRESSING   - Pt will be able to perform 60* degrees shoulder flexion of proximal RUE to demo increased strength and ROM of affected UE for improved ADLs/IADLs GOAL MET  - Pt will be able to perform near full elbow flexion of distal RUE to demo increased strength and ROM of affected UE for improved ADLs/IADLs GOAL MET  - Pt will demo decreased hypertonicity of Modified Mason Scale (MAS) of RUE to 1 at biceps for improved alternate motor patterns, grasp/release, and termination on command for improved dressing/hygiene PROGRESSING   - Pt will demo good carryover of clinic and home  tone reduction strategies for improved AROM initiation with functional reach with ADL function PARTIALLY MET; not completing stretching at home however using RUE as functionally as able    FMC/GMC  - Pt will demo improvement with opening and closing a zipper on Neuro QOL form to with some difficulty demo improved coordination and precision for FM tasks and ADLs GOAL MET  - Pt will increase RUE to independent stabilizer (dependent stabilizer, independent stabilizer, gross assist, semi-functional, functional, fully functional) assist with for ADL/IADL and tabletop tasks for improved functional performance of life roles and salient tasks GOAL MET  - Pt will demo G comprehension of adaptive equipment (long handled reacher/sponge/shoehorn, toileting aid ) use in clinic to improve independence in ADL management in home environment NOT MET; Not interested and able to get self dressed no issues reported    Modalities  - Pt will tolerate BIONESS/NMES/IASTM for improved motor and sensory performance for overall improved strength, tone reduction, and sensation to inc safety and engagement with ADL/IADL function PROGRESSING   - Pt/family will demo G understanding and carryover of use of home NMES unit as prescribed to inc carryover of ROM and strengthening strategies of R UE PROGRESSING     Long Term Goals (8 weeks)  Strength/Endurance  - Pt will increase ability to complete hand  strength with dynamometer testing by 1-2 lbs through motor tasks to improve RUE functional . GOAL MET  - Pt will demo with G carryover of HEP to improve functional progression towards goals in POC and for improved functional use of RUE PROGRESSING     AROM/PROM  - Pt will demo with decreased  RUE  shoulder sublux to no for increased AROM for improved ADL and IADL engagement PROGRESSING   - Pt will be able to perform 90* degrees shoulder flexion of proximal RUE to demo increased strength and ROM of affected UE for improved ADLs/IADLs PARTIALLY  "MET; some compensation noted  - Pt will be able to perform full elbow flexion of distal RUE to demo increased strength and ROM of affected UE for improved ADLs/IADLs    FMC/GMC  - Pt will demo improvement with opening and closing a zipper on Neuro QOL form to with little difficulty demo improved coordination and precision for FM tasks and ADLs GOAL MET   - Pt will increase RUE to gross assist (dependent stabilizer, independent stabilizer, gross assist, semi-functional, functional, fully functional) assist with for ADL/IADL and tabletop tasks for improved functional performance of life roles and salient tasks PROGRESSING     Modalities  - Pt will tolerate BIONESS/NMES/IASTM for improved motor and sensory performance for overall improved strength, tone reduction, and sensation to inc safety and engagement with ADL/IADL function PROGRESSING   - Pt/family will demo G understanding and carryover of use of home NMES unit as prescribed to inc carryover of ROM and strengthening strategies of R UE PROGRESSING       Subjective    PATIENT GOAL: \"I just want to get it to move again. \"    Patient-Specific Functional Scale   Task is scored 0 (unable to perform activity) to 10 (ability to perform activity independently)    Activity Date: 3/26 Date:   ADLs- dressing Able to complete independently    2.        3.        4.            HISTORY OF PRESENT ILLNESS:     Pt is a 59 y.o. male who was referred to Occupational Therapy s/p History of CVA (cerebrovascular accident) [Z86.73]. Pt reports to have had a stroke back in October. Pt lives with his girlfriend and he was home when he started to not feel right and then fell over. Pt reports that his girlfriend helps with ADLs mainly getting shirt and jackets on with his arm. He states that he is not sure how long he was in the hospital for and does not recall ever getting therapy for his UE. Per chart review, Pt had been reciving home health therapy for PT, OT, and SLP however per Pt " "choice, stopping therapy. Pt had been educated on AD in past sessions with Home health as well as increased movement when in therapy. Pt was not working prior to stroke and reports to not do much at home. When asked if Pt completes stretching at home for his RUE, pt denies and states that he \"just lets it go\". Pt reports that his girlfriends father assists in driving Pt to his appts as able.     Per chart review on 10/14/25, \"Tommie Taylor is a 59 y.o. male patient who originally presented to the hospital on 10/14/2024 due to right upper and lower extremity weakness.  On this admission, patient was found to have acute CVA.  MRI revealed left corona radiata stroke.  Neurologist was on board.  TNK was administered in the ICU.  With hypertensive emergency, patient was also noted nicardipine drip.  Patient eventually was placed on dual antiplatelet therapy for total of 21 days, then continue aspirin monotherapy indefinitely as per neurology.  Continue atorvastatin.  Recommended Zio patch in the outpatient to evaluate for any possible arrhythmia, for which PCP may facilitate this.  As per neurologist, patient will follow-up with them in the office in 6 to 8 weeks.  Patient's hospital stay was complicated with him developing acute kidney injury likely secondary to ATN versus TMA in the setting of uncontrolled blood pressure and complicated with contrast associated nephropathy.  Nephrologist was on board.  This past few days, patient's serum creatinine improving slowly.  Nephrologist was okay with the discharge and the patient today.  Patient's BMP should be monitored closely  In the rehab facility and in the outpatient.  Patient's condition improved, but still had significant right sided weakness/paresis, and agreed with acute rehabilitation facility placement and discharge today.\"    PMH:   Past Medical History:   Diagnosis Date    ADHD, adult residual type     Anxiety     Anxiety     Bipolar 1 disorder (HCC)     " Cataract     Left eye    Chronic kidney disease     Colon polyp     CPAP (continuous positive airway pressure) dependence     Depression     Depression     Diabetes mellitus (HCC)     blood sugar 167 on admission    Equinus deformity of foot     GERD (gastroesophageal reflux disease)     Homicidal ideations     Hyperlipidemia     Hypertension     Microalbuminuria     Morbid obesity (HCC)     Neuropathy     Shortness of breath     Sleep apnea     CPAP at bedtime    Sleep apnea     Stroke (HCC)     Suicidal intent          Pain Levels   Resting:  3- in L hand    With Activity:  4- in L hand.      Objective    Impairment Observations:            CATALINO Lopez           UPPER EXTREMITY FUNCTION   Impaired Intact Dominant Hand: R     /PINCH STRENGTH             Dynamometer    - Gross Grasp 6 lbs 65 lbs low  R- improved able to complete  L- remained   Pinch Meter     - Pincer 4 lbs 15 lbs low  R- improved  L- remained    - Tripod 2 lbs 15 lbs low  R- improved   L- improved    - Lateral 5 lbs 18 lbs low  R- improved  L- improved x 9 lbs      AROM (Seated)             Scapula   - Retraction/Protraction  - Elevation/Depression  - Upward/Downward rotation      Shoulder FF 80*  WFL  Compensation in abduction- improved x 70*   Shoulder Ext 20 * WFL   Remained   Shoulder internal rotation       Shoulder external rotation       Shoulder Abd 70*  WFL   Improved    Shoulder Add Near full  WFL      Horizontal Abd        Horizontal Add        Elbow Flex 3/4 WFL   Improved   Elbow Ext Near full  WFL   Remained- tone in elbow   Pronation Full  WFL   Remained   Supination 3/4  WFL   Remained   Wrist Flex trace  WFL   Remained   Wrist Ext trace  WFL   Improved   Digit Flex 1/2  WFL   Remained   Digit Ex 3/4  WFL   Remained   Composite Grasp   WFL      Hook Grasp   WFL      Subluxation  Anterior 1 FB    Remained- slight pain anteriorly     AROM (Supine)             Shoulder FF 90*  WFL   Remained   Shoulder Ext  WFL      Shoulder  ABD 85*   WFL   Remained    Shoulder ADD   WFL      Horizontal ABD        Horizontal ADD        Elbow Flex 1/2  WFL   Remained    Elbow Ext Near full  WFL   Remained   Pronation Resting position  WFL   Remained    Supination Neutral  WFL   Remained    Wrist Flex trace  WFL     Wrist Ext Neutral- resting  WFL     Digit Flex 1/2  WFL  Improved    Digit Ext 3/4 WFL  Improved      PROM (supine position)           Shoulder *  WFL  Tone improved   Shoulder Ext 40*  WFL  Remained   Shoulder ABD 90*  WFL   Remained- slight pain at end range   Shoulder ADD 1/2  WFL      Horizontal ABD      Horizontal ADD        Elbow Flex 3/4  WFL   Tone- pain noted   Elbow Ext 3/4  WFL   Tone   Wrist Flex 1/2  WFL      Wrist Ext 1/2  WFL      Digit Flex   WFL      Digit Ext  WFL     Supination  WFL     Pronation   WFL        MMT             Shoulder FF 3/5 5/5 Improved   Shoulder Ext 3+/5 5/5 Improved   Shoulder Abduction 3/5 5/5 Improved   Shoulder Adduction 3/5 5/5 Improved   Elbow Flex 3+/5 5/5 Improved   Elbow Ext 3+/5 5/5 Improved   Wrist Flex 2+/5 5/5    Wrist Ext 2+/5 5/5    Gross Grasp 2/5 5/5      SENSATION      Monofilament Testing  Normal: 2.83 mm    Diminished light touch: 3.61 mm    Diminished protective sensation: 4.31 mm    Loss of protective sensation: 4.56    Complete loss of sensation / deep pressure: 6.65      Sharp / Dull       Proprioception      Hot/Cold Temp      Stereognosis         COORDINATION      Opposition Able to oppose to D 2-3  WFL    Finger to Nose unable WFL At 90* FF, head and neck compensation   Rapid Alternating Movement Slight supination with rapid movement  WFL Compensation noted throughout   9 Hole Peg Test Able to place 3 legs with assist for placement into digits from therapist      Fxnl Dexterity Test unable    DNT     TONE: MODIFIED VANNESSA SCALE      No increase in muscle tone (0)      Slight Increase in muscle tone with catch and release or min resist at end range (1)      Slight Increase  in muscle tone with catch and release, followed by min resistance through remainder of range (1+) Triceps, wrist flexors, supinators  Remained    Increased muscle tone through full range, able to be moved easily (2)      Considerable increase in tone, difficult to move (3)      Rigid in Flexion/Extension (4)                ADL Simulation- DNT 3/26/25 WFL able to complete independently with zipper  Donning t-shirt/jacket-   26 seconds with no assist         Neuro QOL  Upper Extremity Function (Fine motor, ADL):     Are you able to turn a key in a lock? With some difficulty  Are you able to brush your teeth? With some difficulty  Are you able to make a phone call using a touch tone or key-pad? With some difficulty  Are you able to  coins from a table top? With some difficulty  Are you are able to write with a pen or pencil With much difficulty  Are you able to open and close a zipper? With much difficulty  Are you able to wash and dry your body? With much difficulty  Are you able to shampoo your hair? Unable to do  Are you able to open previously opened jars? With much difficulty  Are you able to hold a plate full of food? With much difficulty  Are you able to pull on trousers? With some difficulty  Are you able to button your shirt? With much difficulty  Are you able to trim your fingernails? With much difficulty  Are you able to cut your toe nails?   Are you able to bend down and  clothing from the floor? With some difficulty  How much DIFFICULTY do you currently have using a spoon to eat a meal? Some difficulty  How much DIFFICULTY do you currently have putting on a pullover shirt? A lot of difficulty  How much DIFFICULTY do you currently have taking off a pullover shirt? A lot of difficulty  How much DIFFICULTY do you currently have removing wrappings from small objects? A lot of difficulty  How much DIFFUCULTY do you currently have opening medications or vitamin containers (e.g. childproof  containers, small bottles)? A lot of difficulty        OTHER PLANNED THERAPY INTERVENTIONS:   Supine, seated, and in stance neuro re-ed  Task-Oriented Training  Tricep AG  NMES/FES  Cryotherapy for tone reduction  Hot Packs for pain  TENS for pain  FMC/prehension  GMC  Proximal to distal teaming  Timed Trials  Manual tx  IASTM  Hand to target  Sensory re-ed (Cutaneous/Proprioceptive)  Seated functional reach: crossing midline  Supine place and hold  WBearing strategies   Closed chain activities  Open chain activities  Mirror Therapy  HEP  Orthotic Development

## 2025-03-26 NOTE — TELEPHONE ENCOUNTER
Doctor: Dr. Cotter    Reason for call: Calling in regards to zio results.    Call back#: [phone]

## 2025-03-28 ENCOUNTER — APPOINTMENT (OUTPATIENT)
Dept: PHYSICAL THERAPY | Facility: CLINIC | Age: 60
End: 2025-03-28
Payer: MEDICARE

## 2025-03-28 ENCOUNTER — PATIENT OUTREACH (OUTPATIENT)
Dept: INTERNAL MEDICINE CLINIC | Facility: CLINIC | Age: 60
End: 2025-03-28

## 2025-03-28 NOTE — PROGRESS NOTES
Outgoing call   03/28/2025    Re: Mahin stark    Pt has been approved for [facility]. Pt has to pay for medical trips.  CMOC left message for patients wife on this date.     At this time all goals have been completed. CMOC will close the case.

## 2025-03-31 ENCOUNTER — PATIENT OUTREACH (OUTPATIENT)
Dept: INTERNAL MEDICINE CLINIC | Facility: CLINIC | Age: 60
End: 2025-03-31

## 2025-03-31 NOTE — PROGRESS NOTES
Received in basket message from CMOC.   Pt was approved for [facility].   A1C down to 6.7  Goals met. RN CM will close episode. Will reopen if new referral is received.

## 2025-04-02 ENCOUNTER — OFFICE VISIT (OUTPATIENT)
Dept: OCCUPATIONAL THERAPY | Facility: CLINIC | Age: 60
End: 2025-04-02
Payer: COMMERCIAL

## 2025-04-02 ENCOUNTER — TELEPHONE (OUTPATIENT)
Dept: INTERNAL MEDICINE CLINIC | Facility: CLINIC | Age: 60
End: 2025-04-02

## 2025-04-02 DIAGNOSIS — R29.898 WEAKNESS OF RIGHT ARM: ICD-10-CM

## 2025-04-02 DIAGNOSIS — Z86.73 HISTORY OF CVA (CEREBROVASCULAR ACCIDENT): Primary | ICD-10-CM

## 2025-04-02 PROCEDURE — 97112 NEUROMUSCULAR REEDUCATION: CPT

## 2025-04-02 NOTE — PROGRESS NOTES
"Occupational Therapy Daily Note:    Today's date: 2025  Patient name: Tommie Taylor  : 1965  MRN: [id number]  Referring provider: Harshad Romo*  Dx:   Encounter Diagnoses   Name Primary?    History of CVA (cerebrovascular accident) Yes    Weakness of right arm                       POC expires Unit limit Auth Expiration date PT/OT/ST + Visit Limit?   3/27/25 N/A 25 BOMN                           Visit/Unit Tracking  AUTH Status:  Date 1/30 1/31 2/ PN 3/5 3/12 3/19   BOMN Used 1 2 3 4 5 6 7 1 2 3    Remaining  7 6 5 4 3 2 1 7 6   5      Visit/Unit Tracking  AUTH Status:  Date 3/21 3/26 RE /          BOMN Used 4 5 1           Remaining  4 3 7            Duration in weeks: 8 weeks  Plan of care beginning date: 3/26/25  Plan of care expiration date:     Re-eval: 25    Precautions: CVA, HTN, high BP, Orthostatic       Subjective: \"I don't know, I just want it to move.\"    Objective: See treatment below.     Neuro Re-ed: Started in stance with pt performing UE [place] x 2 min into forward flexion / shoulder adduction > abduction for RUE control.     Half bosu ball with stabilization from dycem performing modified push-ups / lateral leaning     Weight bearing onto tray table; stabilized R hand on dycem, pt pushing/extending RUE down on tray table while at same time controlling tray table upon ascent.     In semi-supine (wedge), facilitated pt reaching vision occluded into shoulder extension for styrofoam block > crossing midline and rolling to place to dowels with OT stabilizing forearm into neutral position. Continue this into shoulder extension <> flexion with elbow flexion/extension at 90* followed by placement to dowels. Concluded this with reaching in stance to block across midline > shoulder abduction movement against gravity.  Ended with 3 lb wrist weight to RUE for added error and with pt grasping from vertical dowels to target across midline.  X 8 " repetitions completed in 3 different positions; removed 10 with weight donned, 14 removed weight.     Assessment: Tolerated treatment well. Patient would benefit from continued skilled OT. Compensation with trunk rotation 2/2 dec ROM, tone, and pain to end range.     Plan: Continued skilled OT per POC

## 2025-04-02 NOTE — TELEPHONE ENCOUNTER
Patient's spouse called to reschedule pts appointment because he would not have been able to make it in to his endo appointment on time. Patients appt was an overbook.     Next available for a f/u with endo is 9/3. Would that day be appropriate to schedule pt on? Please advise.

## 2025-04-04 ENCOUNTER — APPOINTMENT (OUTPATIENT)
Dept: OCCUPATIONAL THERAPY | Facility: CLINIC | Age: 60
End: 2025-04-04
Payer: COMMERCIAL

## 2025-04-04 ENCOUNTER — APPOINTMENT (OUTPATIENT)
Dept: PHYSICAL THERAPY | Facility: CLINIC | Age: 60
End: 2025-04-04
Payer: COMMERCIAL

## 2025-04-09 ENCOUNTER — APPOINTMENT (OUTPATIENT)
Dept: OCCUPATIONAL THERAPY | Facility: CLINIC | Age: 60
End: 2025-04-09
Payer: COMMERCIAL

## 2025-04-09 ENCOUNTER — APPOINTMENT (OUTPATIENT)
Dept: PHYSICAL THERAPY | Facility: CLINIC | Age: 60
End: 2025-04-09
Payer: COMMERCIAL

## 2025-04-11 ENCOUNTER — APPOINTMENT (OUTPATIENT)
Dept: PHYSICAL THERAPY | Facility: CLINIC | Age: 60
End: 2025-04-11
Payer: COMMERCIAL

## 2025-04-11 ENCOUNTER — OFFICE VISIT (OUTPATIENT)
Dept: OCCUPATIONAL THERAPY | Facility: CLINIC | Age: 60
End: 2025-04-11
Payer: COMMERCIAL

## 2025-04-11 DIAGNOSIS — Z86.73 HISTORY OF CVA (CEREBROVASCULAR ACCIDENT): Primary | ICD-10-CM

## 2025-04-11 PROCEDURE — 97112 NEUROMUSCULAR REEDUCATION: CPT

## 2025-04-11 PROCEDURE — 97110 THERAPEUTIC EXERCISES: CPT

## 2025-04-11 NOTE — PROGRESS NOTES
"Occupational Therapy Daily Note:    Today's date: 2025  Patient name: Tommie Taylor  : 1965  MRN: [id number]  Referring provider: Harshad Romo*  Dx:   Encounter Diagnosis   Name Primary?    History of CVA (cerebrovascular accident) Yes          POC expires Unit limit Auth Expiration date PT/OT/ST + Visit Limit?   3/27/25 N/A 25 BOMN                           Visit/Unit Tracking  AUTH Status:  Date  2 PN 3/5 3/12 3/19   BOMN Used 1 2 3 4 5 6 7 1 2 3    Remaining  7 6 5 4 3 2 1 7 6   5      Visit/Unit Tracking  AUTH Status:  Date 3/21 3/26 RE          BOMN Used 4 5 1 2          Remaining  4 3 7 6           Duration in weeks: 8 weeks  Plan of care beginning date: 3/26/25  Plan of care expiration date:     Re-eval: 25    Precautions: CVA, HTN, high BP, Orthostatic        Subjective: \"I feel like I'm losing my .\"  (Dycem added to UEB handle for sustained )    Objective: See treatment below. Session focusing motor skills, UE strength, stability and overall activity endurance improving indep in ADL/IADL mngt.      Neuro Re-ed: In unsupported stance at high/low table, pt retrieved blocks from left facilitating crossing midline placing to scanning board positioned midline of pt focusing on proximal strength/stability, automaticity of open/closed hand,  strength, fx reaching and static stance for sink-side activities.  Pt retrieved blocks using right hand placing to corresponding letter/number on scanning board.  Pt then retrieved blocks placing to target container using black tongs promoting hand strength. Pt required cues to extend elbow increasing ROM for block placement vs trunk compensation,  G carryover.     Thera Ex: Pt engaged in UBE x 5 min prograde x 5 min retrograde w/o resist focusing on increasing proximal RUE strength, cardiopulmonary endurance and sustained grasp. Dycem added to handle bar providing sustained " gripping abilities w/reposition of hand x4 throughout task.     Assessment: Tolerated treatment well.  Pt tolerated stance for length of activity w/3 rest breaks required.   Pt demo-improvement in proximal stability and distal control during block placement, no droppage noted.  Pt required cues for open hand during placement <25% of time. Patient would benefit from continued skilled OT.    Plan: Continued skilled OT per POC

## 2025-04-16 ENCOUNTER — APPOINTMENT (OUTPATIENT)
Dept: OCCUPATIONAL THERAPY | Facility: CLINIC | Age: 60
End: 2025-04-16
Payer: COMMERCIAL

## 2025-04-16 ENCOUNTER — APPOINTMENT (OUTPATIENT)
Dept: PHYSICAL THERAPY | Facility: CLINIC | Age: 60
End: 2025-04-16
Payer: COMMERCIAL

## 2025-04-18 ENCOUNTER — APPOINTMENT (OUTPATIENT)
Dept: PHYSICAL THERAPY | Facility: CLINIC | Age: 60
End: 2025-04-18
Payer: COMMERCIAL

## 2025-04-18 ENCOUNTER — APPOINTMENT (OUTPATIENT)
Dept: OCCUPATIONAL THERAPY | Facility: CLINIC | Age: 60
End: 2025-04-18
Payer: COMMERCIAL

## 2025-04-23 ENCOUNTER — APPOINTMENT (OUTPATIENT)
Dept: PHYSICAL THERAPY | Facility: CLINIC | Age: 60
End: 2025-04-23
Payer: COMMERCIAL

## 2025-04-23 ENCOUNTER — OFFICE VISIT (OUTPATIENT)
Dept: OCCUPATIONAL THERAPY | Facility: CLINIC | Age: 60
End: 2025-04-23
Payer: COMMERCIAL

## 2025-04-23 DIAGNOSIS — R29.898 WEAKNESS OF RIGHT ARM: ICD-10-CM

## 2025-04-23 DIAGNOSIS — Z86.73 HISTORY OF CVA (CEREBROVASCULAR ACCIDENT): Primary | ICD-10-CM

## 2025-04-23 PROCEDURE — 97110 THERAPEUTIC EXERCISES: CPT

## 2025-04-23 PROCEDURE — 97530 THERAPEUTIC ACTIVITIES: CPT

## 2025-04-23 PROCEDURE — 97112 NEUROMUSCULAR REEDUCATION: CPT

## 2025-04-23 NOTE — PROGRESS NOTES
"Occupational Therapy Daily Note:    Today's date: 2025  Patient name: Tommie Taylor  : 1965  MRN: [id number]  Referring provider: Harshad Romo*  Dx:   Encounter Diagnoses   Name Primary?    History of CVA (cerebrovascular accident) Yes    Weakness of right arm           POC expires Unit limit Auth Expiration date PT/OT/ST + Visit Limit?   3/27/25 N/A 25 BOMN                           Visit/Unit Tracking  AUTH Status:  Date 1/30 1/31 2/ PN 3/5 3/12 3/19   BOMN Used 1 2 3 4 5 6 7 1 2 3    Remaining  7 6 5 4 3 2 1 7 6   5      Visit/Unit Tracking  AUTH Status:  Date 3/21 3/26 RE         BOMN Used 4 5 1 2 3         Remaining  4 3 7 6 5          Duration in weeks: 8 weeks  Plan of care beginning date: 3/26/25  Plan of care expiration date: 25    Re-eval: 25        Subjective: \"I'm gonna get it.\"    Objective: See treatment below.  Session focusing motor skills, UE strength, stability and overall activity endurance improving indep in ADL/IADL mngt.     Neuro Re-ed: Pt participated in large peg activity in stance focusing in automaticity of grasp/release, fxl reaching,  sustained pinch patterns, static stance and overall activity endurance.  Pt retrieved large pegs from bowl positioned to left facilitating crossing midline following template card. Pt noted with max difficulty w/in-hand manipulation to position pegs for sustained grasp when inserting pegs into mat w/frequent droppage.  Activity modified by placing pegs in smaller mat, improving pts ability to grasp and place w/o dropping peg.       Session concluded with BB flip focusing on pronation/supination, elbow extension and sustained grasp. Pt scooped BB onto spatula flipping to target container achieving near 1/4 end range supination. Pt able to negotiate spatula and BB achieving target 100% of time.      Thera Ex: Pt engaged in UBE x 5 min prograde x 5 min retrograde w/o resist focusing " on increasing proximal RUE strength, cardiopulmonary endurance and sustained grasp.  Pt utilized timer this session but able to sustain grasp on handle bar w/o dycem.     Assessment: Tolerated treatment well. Pt noted to reposition large pegs in hand>25% of time improving gripping abilities for peg placement.  Pt required rest breaks throughout session completing peg activity and BB flip in stance. Pt continues to present with tone to bicep to fully extend UE impeding AROM during fxl reaching tasks. Patient would benefit from continued skilled OT.    Plan: Continued skilled OT per POC

## 2025-04-25 ENCOUNTER — OFFICE VISIT (OUTPATIENT)
Dept: OCCUPATIONAL THERAPY | Facility: CLINIC | Age: 60
End: 2025-04-25
Payer: COMMERCIAL

## 2025-04-25 ENCOUNTER — APPOINTMENT (OUTPATIENT)
Dept: PHYSICAL THERAPY | Facility: CLINIC | Age: 60
End: 2025-04-25
Payer: COMMERCIAL

## 2025-04-25 DIAGNOSIS — Z86.73 HISTORY OF CVA (CEREBROVASCULAR ACCIDENT): Primary | ICD-10-CM

## 2025-04-25 DIAGNOSIS — R29.898 WEAKNESS OF RIGHT ARM: ICD-10-CM

## 2025-04-25 PROCEDURE — 97530 THERAPEUTIC ACTIVITIES: CPT

## 2025-04-25 PROCEDURE — 97110 THERAPEUTIC EXERCISES: CPT

## 2025-04-25 NOTE — PROGRESS NOTES
"Occupational Therapy Daily Note:    Today's date: 2025  Patient name: Tommie Taylor  : 1965  MRN: [id number]  Referring provider: Harshad Romo*  Dx:   Encounter Diagnoses   Name Primary?    History of CVA (cerebrovascular accident) Yes    Weakness of right arm                       POC expires Unit limit Auth Expiration date PT/OT/ST + Visit Limit?   3/27/25 N/A 25 BOMN                           Visit/Unit Tracking  AUTH Status:  Date 1/30 1/31 2/7 2/14 2/19 2/26 2/28 PN 3/5 3/12 3/19   BOMN Used 1 2 3 4 5 6 7 1 2 3    Remaining  7 6 5 4 3 2 1 7 6   5      Visit/Unit Tracking  AUTH Status:  Date 3/21 3/26 RE /       BOMN Used 4 5 1 2 3 4        Remaining  4 3 7 6 5 4         Duration in weeks: 8 weeks  Plan of care beginning date: 3/26/25  Plan of care expiration date: 25    Re-eval: 25         Subjective: \"My hand is tired.\"     Objective: See treatment below.     Neuro Re-ed:  With air cast on and 2# wrist weight, pt performed reaching for blocks and transferring to container into extension, abduction, forward flexion for improved RUE AROM / reaching.     Thera Act:  FMC demands of lacing a board with R hand focusing on grasp, functional movement, pinch while sustaining 2# wrist weight.     Concluded with tip pinch of grasping small 1/2\" blocks, transferring to container by extending RUE and goal of extending/opening digits as able .  Attempted coordination task with pencil/highlighter, although pt unable to shift object in hand despite modification of adding dycem to improve grasp.      Assessment: Tolerated treatment well. Patient would benefit from continued skilled OT.  Pt benefits from air cast 2/2 spasticity and difficulty isolating shoulder movements.  Pt demo significant hand difficulties and decreased coordination with inability to shift or rotate items in hand.    Plan: Continued skilled OT per POC    "

## 2025-04-29 ENCOUNTER — OFFICE VISIT (OUTPATIENT)
Dept: OCCUPATIONAL THERAPY | Facility: CLINIC | Age: 60
End: 2025-04-29
Payer: COMMERCIAL

## 2025-04-29 DIAGNOSIS — Z86.73 HISTORY OF CVA (CEREBROVASCULAR ACCIDENT): Primary | ICD-10-CM

## 2025-04-29 DIAGNOSIS — R29.898 WEAKNESS OF RIGHT ARM: ICD-10-CM

## 2025-04-29 PROCEDURE — 97110 THERAPEUTIC EXERCISES: CPT

## 2025-04-29 PROCEDURE — 97150 GROUP THERAPEUTIC PROCEDURES: CPT

## 2025-04-29 NOTE — PROGRESS NOTES
"Occupational Therapy Daily Note:    Today's date: 2025  Patient name: Tommie Taylor  : 1965  MRN: [id number]  Referring provider: Harshad Romo*  Dx:   Encounter Diagnoses   Name Primary?    History of CVA (cerebrovascular accident) Yes    Weakness of right arm        Start Time: 1615  Stop Time: 1700  Total time in clinic (min): 45 minutes    POC expires Unit limit Auth Expiration date PT/OT/ST + Visit Limit?   3/27/25 N/A 25 BOMN                           Visit/Unit Tracking  AUTH Status:  Date  2 PN 3/5 3/12 3/19   BOMN Used 1 2 3 4 5 6 7 1 2 3    Remaining  7 6 5 4 3 2 1 7 6   5      Visit/Unit Tracking  AUTH Status:  Date 3/21 3/26 RE /      BOMN Used 4 5 1 2 3 4 5       Remaining  4 3 7 6 5 4 3        Duration in weeks: 8 weeks  Plan of care beginning date: 3/26/25  Plan of care expiration date: 25    Re-eval: 25    Subjective: \"Nope, I'm going to do it.\"    Objective: See treatment below.     Thera Ex: Pt engaged in UBE x 5 min prograde x 5 min retrograde w/o resist focusing on increasing proximal RUE strength, cardiopulmonary endurance and sustained grasp.    Thera Act:  Pt engaged in block retrieval retrieving blocks from low surface facilitating elbow extension bending elbow cross body dropping block over shoulder on contralateral side focusing on proximal stability, automaticity of grasp/release and activity endurance improving reaching and grasping for ADL/IADL mngt.        Session concluded with FM task to retreive washers placed vertically in putty and placing in slotted container focusing on FM pinch patterns.  Pad pinch accuracy emerging.     Assessment: Tolerated treatment well. Improvement in elbow extension noted w/decrease trunk compensation during fwd reaching. Pt demo-G grasping abilities this session. Patient would benefit from continued skilled OT.    Plan: Continued skilled OT per " POC    4/49:  [phone]  [phone]

## 2025-04-30 ENCOUNTER — APPOINTMENT (OUTPATIENT)
Dept: PHYSICAL THERAPY | Facility: CLINIC | Age: 60
End: 2025-04-30
Payer: COMMERCIAL

## 2025-04-30 ENCOUNTER — APPOINTMENT (OUTPATIENT)
Dept: OCCUPATIONAL THERAPY | Facility: CLINIC | Age: 60
End: 2025-04-30
Payer: COMMERCIAL

## 2025-05-01 ENCOUNTER — EVALUATION (OUTPATIENT)
Dept: OCCUPATIONAL THERAPY | Facility: CLINIC | Age: 60
End: 2025-05-01
Payer: COMMERCIAL

## 2025-05-01 DIAGNOSIS — Z86.73 HISTORY OF CVA (CEREBROVASCULAR ACCIDENT): Primary | ICD-10-CM

## 2025-05-01 PROCEDURE — 97110 THERAPEUTIC EXERCISES: CPT

## 2025-05-01 PROCEDURE — 97112 NEUROMUSCULAR REEDUCATION: CPT

## 2025-05-01 NOTE — PROGRESS NOTES
OCCUPATIONAL THERAPY RE-EVALUATION:    5/1/25  Tommei Taylor  1965  [id number]  Harshad Romo*   Diagnosis ICD-10-CM Associated Orders   1. History of CVA (cerebrovascular accident)  Z86.73               Assessment/Plan    Skilled Analysis:  Tommie Taylor is a 59 y.o. male referred to Occupational Therapy s/p History of CVA (cerebrovascular accident) [Z86.73].   Pt participated in skilled OT progress note and following formalized testing as well as clinical observation, Pt presents with the following areas of deficit:  RUE weakness, tone, decreased ability to complete ADLs, ROM, strength, and education. Pt completing AROM and AAROM this date with improvements in movement noted with to 100* FF and continued/remained ABD. Pt with continued tone noted in biceps, wrist flexors and digits. Pt with decreased pain noted this date with AROM, AAROM, and PROM in shoulder. Pt reports that he does not complete much stretching or exercises at home, however has been able to complete more fxl tasks with use R hand as stabilizer and assist. He reports that most recently was able to  and carry laundry basket with both hands. He has also been provided with resistive sponge and red theraputty to complete squeezes at home which have been going well. Pt with noted improvements in proximal and distal ROM both extension and flexion. Pt also able to complete 9 HPT with assist for holding and pincer grasp to put in, however able to pinch and remove independently. Pt has been using his R hand to complete zipping and dressing with no issues or assist needed. Pt will benefit from skilled Occupational Therapy services 2x/week for 4 weeks with focus on neuro re-ed, there ex, there act, and HEP.  Tommie Taylor is in agreement with POC.    Todays treatment: Pt tolerated PROM to RUE while in supine with added pressure for increased stretch and then tolerated added error with red theraband into FF and ext with focus on  decreased back compensation and leaning.    POC expires Unit limit Auth Expiration date PT/OT/ST + Visit Limit?   3/27/25 N/A 12/31/25 BOMN                           Visit/Unit Tracking  AUTH Status:  Date 1/30 1/31 2/7 2/14 2/19 2/26 2/28 PN 3/5 3/12 3/19   BOMN Used 1 2 3 4 5 6 7 1 2 3    Remaining  7 6 5 4 3 2 1 7 6   5      Visit/Unit Tracking  AUTH Status:  Date 3/21 3/26 RE 4/2 4/11 4/23 4/25 4/29 5/1  PN     BOMN Used 4 5 1 2 3 4 5 6      Remaining  4 3 7 6 5 4 3 2         Duration in weeks: 8 weeks  Plan of care beginning date: 3/26/25  Plan of care expiration date: 5/21/25    Re-eval: 5/21/25    Precautions: CVA, HTN, high BP, Orthostatic              GOALS    Short Term Goals (4 weeks)  Strength/Endurance  - Pt will demo with G tolerance to supine, and seated exercise x 10 minutes with minimal rest breaks required for increased engagement in life roles and weekly exercise regimen GOAL MET; good motivation throughout   - Pt will demo with G understanding of HEP to improve functional progression towards goals in POC and for improved functional use of RUE PROGRESSING ; uses resistive sponge daily, continued education on stretching    AROM/PROM  - Pt will demo with decreased  RUE  shoulder sublux to trace for increased AROM for improved ADL and IADL engagement PROGRESSING   - Pt will be able to perform 60* degrees shoulder flexion of proximal RUE to demo increased strength and ROM of affected UE for improved ADLs/IADLs GOAL MET  - Pt will be able to perform near full elbow flexion of distal RUE to demo increased strength and ROM of affected UE for improved ADLs/IADLs GOAL MET; 3/4  - Pt will demo decreased hypertonicity of Modified Mason Scale (MAS) of RUE to 1 at biceps for improved alternate motor patterns, grasp/release, and termination on command for improved dressing/hygiene PROGRESSING   - Pt will demo good carryover of clinic and home tone reduction strategies for improved AROM initiation with  functional reach with ADL function NOT MET; not completing stretching at home however using RUE as functionally as able    FMC/GMC  - Pt will demo improvement with opening and closing a zipper on Neuro QOL form to with some difficulty demo improved coordination and precision for FM tasks and ADLs GOAL MET  - Pt will increase RUE to independent stabilizer (dependent stabilizer, independent stabilizer, gross assist, semi-functional, functional, fully functional) assist with for ADL/IADL and tabletop tasks for improved functional performance of life roles and salient tasks GOAL MET  - Pt will demo G comprehension of adaptive equipment (long handled reacher/sponge/shoehorn, toileting aid ) use in clinic to improve independence in ADL management in home environment NOT MET; Not interested and able to get self dressed no issues reported    Modalities  - Pt will tolerate BIONESS/NMES/IASTM for improved motor and sensory performance for overall improved strength, tone reduction, and sensation to inc safety and engagement with ADL/IADL function PROGRESSING   - Pt/family will demo G understanding and carryover of use of home NMES unit as prescribed to inc carryover of ROM and strengthening strategies of R UE NOT MET; discontinue due to PT with poor at home carry over despite education     Long Term Goals (8 weeks)  Strength/Endurance  - Pt will increase ability to complete hand  strength with dynamometer testing by 1-2 lbs through motor tasks to improve RUE functional . GOAL MET  - Pt will demo with G carryover of HEP to improve functional progression towards goals in POC and for improved functional use of RUE PROGRESSING     AROM/PROM  - Pt will demo with decreased  RUE  shoulder sublux to no for increased AROM for improved ADL and IADL engagement PROGRESSING   - Pt will be able to perform 90* degrees shoulder flexion of proximal RUE to demo increased strength and ROM of affected UE for improved ADLs/IADLs GOAL MET  "in seated and supine position  - Pt will be able to perform full elbow flexion of distal RUE to demo increased strength and ROM of affected UE for improved ADLs/IADLs PROGRESSING    FMC/GMC  - Pt will demo improvement with opening and closing a zipper on Neuro QOL form to with little difficulty demo improved coordination and precision for FM tasks and ADLs GOAL MET   - Pt will increase RUE to gross assist (dependent stabilizer, independent stabilizer, gross assist, semi-functional, functional, fully functional) assist with for ADL/IADL and tabletop tasks for improved functional performance of life roles and salient tasks GOAL MET; can carry laundry basket      Modalities  - Pt will tolerate BIONESS/NMES/IASTM for improved motor and sensory performance for overall improved strength, tone reduction, and sensation to inc safety and engagement with ADL/IADL function PROGRESSING   - Pt/family will demo G understanding and carryover of use of home NMES unit as prescribed to inc carryover of ROM and strengthening strategies of R UE DISCONTINUE; see STG update       Subjective    PATIENT GOAL: \"I just want to get it to move again. \"    Patient-Specific Functional Scale   Task is scored 0 (unable to perform activity) to 10 (ability to perform activity independently)    Activity Date: 3/26 Date:   ADLs- dressing Able to complete independently    2.        3.        4.            HISTORY OF PRESENT ILLNESS:     Pt is a 59 y.o. male who was referred to Occupational Therapy s/p History of CVA (cerebrovascular accident) [Z86.73]. Pt reports to have had a stroke back in October. Pt lives with his girlfriend and he was home when he started to not feel right and then fell over. Pt reports that his girlfriend helps with ADLs mainly getting shirt and jackets on with his arm. He states that he is not sure how long he was in the hospital for and does not recall ever getting therapy for his UE. Per chart review, Pt had been reciving home " "health therapy for PT, OT, and SLP however per Pt choice, stopping therapy. Pt had been educated on AD in past sessions with Home health as well as increased movement when in therapy. Pt was not working prior to stroke and reports to not do much at home. When asked if Pt completes stretching at home for his RUE, pt denies and states that he \"just lets it go\". Pt reports that his girlfriends father assists in driving Pt to his appts as able.     Per chart review on 10/14/25, \"Tommie Taylor is a 59 y.o. male patient who originally presented to the hospital on 10/14/2024 due to right upper and lower extremity weakness.  On this admission, patient was found to have acute CVA.  MRI revealed left corona radiata stroke.  Neurologist was on board.  TNK was administered in the ICU.  With hypertensive emergency, patient was also noted nicardipine drip.  Patient eventually was placed on dual antiplatelet therapy for total of 21 days, then continue aspirin monotherapy indefinitely as per neurology.  Continue atorvastatin.  Recommended Zio patch in the outpatient to evaluate for any possible arrhythmia, for which PCP may facilitate this.  As per neurologist, patient will follow-up with them in the office in 6 to 8 weeks.  Patient's hospital stay was complicated with him developing acute kidney injury likely secondary to ATN versus TMA in the setting of uncontrolled blood pressure and complicated with contrast associated nephropathy.  Nephrologist was on board.  This past few days, patient's serum creatinine improving slowly.  Nephrologist was okay with the discharge and the patient today.  Patient's BMP should be monitored closely  In the rehab facility and in the outpatient.  Patient's condition improved, but still had significant right sided weakness/paresis, and agreed with acute rehabilitation facility placement and discharge today.\"    PMH:   Past Medical History:   Diagnosis Date    ADHD, adult residual type     Anxiety "     Anxiety     Bipolar 1 disorder (HCC)     Cataract     Left eye    Chronic kidney disease     Colon polyp     CPAP (continuous positive airway pressure) dependence     Depression     Depression     Diabetes mellitus (HCC)     blood sugar 167 on admission    Equinus deformity of foot     GERD (gastroesophageal reflux disease)     Homicidal ideations     Hyperlipidemia     Hypertension     Microalbuminuria     Morbid obesity (HCC)     Neuropathy     Shortness of breath     Sleep apnea     CPAP at bedtime    Sleep apnea     Stroke ([facility])     Suicidal intent          Pain Levels   Resting:  3- in L hand    With Activity:  4- in L hand.      Objective    Impairment Observations:            CATALINO Lopez           UPPER EXTREMITY FUNCTION   Impaired Intact Dominant Hand: R     /PINCH STRENGTH             Dynamometer    - Gross Grasp 20 lbs 70 lbs low  R- improved x 14 lbs  L- improved x 5 lbs   Pinch Meter     - Pincer 7 lbs 15 lbs low  R- improved x 3 lbs   L- remained    - Tripod 6 lbs 15 lbs low  R- improved x 4 lbs  L- improved    - Lateral 9 lbs 18 lbs low  R- improved x 4 lbs  L- improved x 9 lbs      AROM (Seated)             Scapula   - Retraction/Protraction  - Elevation/Depression  - Upward/Downward rotation      Shoulder *  WFL  Compensation in abduction- improved x 20*   Shoulder Ext 20 * WFL   Remained   Shoulder internal rotation       Shoulder external rotation       Shoulder Abd 70*  WFL   Remained    Shoulder Add Near full  WFL      Horizontal Abd        Horizontal Add        Elbow Flex 3/4 WFL   Improved   Elbow Ext Near full  WFL   Remained- tone in elbow   Pronation Full  WFL   Remained   Supination 3/4  WFL   Remained   Wrist Flex 1/4 WFL   Improved   Wrist Ext trace  WFL   Remained   Digit Flex 1/2  WFL   Remained   Digit Ex 3/4  WFL   Remained   Composite Grasp   WFL      Hook Grasp   WFL      Subluxation  Anterior 1 FB    Remained- no pain     AROM (Supine)      DNT 5/1/25        Shoulder FF 90*  WFL   Remained   Shoulder Ext  WFL      Shoulder ABD 85*   WFL   Remained    Shoulder ADD   WFL      Horizontal ABD        Horizontal ADD        Elbow Flex 1/2  WFL   Remained    Elbow Ext Near full  WFL   Remained   Pronation Resting position  WFL   Remained    Supination Neutral  WFL   Remained    Wrist Flex trace  WFL     Wrist Ext Neutral- resting  WFL     Digit Flex 1/2  WFL  Improved    Digit Ext 3/4 WFL  Improved      PROM (supine position)           Shoulder *  WFL  Tone improved   Shoulder Ext 40*  WFL  Remained   Shoulder ABD 90*  WFL   Remained- slight pain at end range   Shoulder ADD 1/2  WFL      Horizontal ABD      Horizontal ADD        Elbow Flex 3/4  WFL   Tone- pain noted   Elbow Ext 3/4  WFL   Tone   Wrist Flex 1/2  WFL      Wrist Ext 1/2  WFL      Digit Flex   WFL      Digit Ext  WFL     Supination  WFL     Pronation   WFL        MMT             Shoulder FF 3/5 5/5 Improved   Shoulder Ext 3+/5 5/5 Improved   Shoulder Abduction 3/5 5/5 Improved   Shoulder Adduction 3/5 5/5 Improved   Elbow Flex 3+/5 5/5 Improved   Elbow Ext 3+/5 5/5 Improved   Wrist Flex 2+/5 5/5    Wrist Ext 2+/5 5/5    Gross Grasp 2/5 5/5      SENSATION      Monofilament Testing  Normal: 2.83 mm    Diminished light touch: 3.61 mm    Diminished protective sensation: 4.31 mm    Loss of protective sensation: 4.56    Complete loss of sensation / deep pressure: 6.65      Sharp / Dull       Proprioception      Hot/Cold Temp      Stereognosis         COORDINATION      Opposition Able to oppose to D 2-3  WFL Remained   Finger to Nose Able to reach however body compensation WFL At 90* FF, head and neck compensation   Rapid Alternating Movement Slight supination with rapid movement  WFL Compensation noted throughout   9 Hole Peg Test Able to place all pegs with therapist assist to hold for pincer grasp in 3 min 23 sec, pt then removed with no assist and pincer grasp in 33 sec 27 seconds     Fxnl Dexterity Test  unable    DNT     TONE: MODIFIED VANNESSA SCALE      No increase in muscle tone (0)      Slight Increase in muscle tone with catch and release or min resist at end range (1)      Slight Increase in muscle tone with catch and release, followed by min resistance through remainder of range (1+) Triceps, wrist flexors, supinators  Remained    Increased muscle tone through full range, able to be moved easily (2)      Considerable increase in tone, difficult to move (3)      Rigid in Flexion/Extension (4)                ADL Simulation- DNT 3/26/25 WFL able to complete independently with zipper  Donning t-shirt/jacket-   26 seconds with no assist         Neuro QOL  Upper Extremity Function (Fine motor, ADL):     Are you able to turn a key in a lock? With some difficulty  Are you able to brush your teeth? With some difficulty  Are you able to make a phone call using a touch tone or key-pad? With some difficulty  Are you able to  coins from a table top? With some difficulty  Are you are able to write with a pen or pencil With much difficulty  Are you able to open and close a zipper? With much difficulty  Are you able to wash and dry your body? With much difficulty  Are you able to shampoo your hair? Unable to do  Are you able to open previously opened jars? With much difficulty  Are you able to hold a plate full of food? With much difficulty  Are you able to pull on trousers? With some difficulty  Are you able to button your shirt? With much difficulty  Are you able to trim your fingernails? With much difficulty  Are you able to cut your toe nails?   Are you able to bend down and  clothing from the floor? With some difficulty  How much DIFFICULTY do you currently have using a spoon to eat a meal? Some difficulty  How much DIFFICULTY do you currently have putting on a pullover shirt? A lot of difficulty  How much DIFFICULTY do you currently have taking off a pullover shirt? A lot of difficulty  How much  DIFFICULTY do you currently have removing wrappings from small objects? A lot of difficulty  How much DIFFUCULTY do you currently have opening medications or vitamin containers (e.g. childproof containers, small bottles)? A lot of difficulty        OTHER PLANNED THERAPY INTERVENTIONS:   Supine, seated, and in stance neuro re-ed  Task-Oriented Training  Tricep AG  NMES/FES  Cryotherapy for tone reduction  Hot Packs for pain  TENS for pain  FMC/prehension  GMC  Proximal to distal teaming  Timed Trials  Manual tx  IASTM  Hand to target  Sensory re-ed (Cutaneous/Proprioceptive)  Seated functional reach: crossing midline  Supine place and hold  WBearing strategies   Closed chain activities  Open chain activities  Mirror Therapy  HEP  Orthotic Development

## 2025-05-05 ENCOUNTER — OFFICE VISIT (OUTPATIENT)
Dept: INTERNAL MEDICINE CLINIC | Facility: CLINIC | Age: 60
End: 2025-05-05

## 2025-05-05 VITALS
HEIGHT: 66 IN | DIASTOLIC BLOOD PRESSURE: 82 MMHG | SYSTOLIC BLOOD PRESSURE: 147 MMHG | BODY MASS INDEX: 41.98 KG/M2 | HEART RATE: 63 BPM | OXYGEN SATURATION: 98 % | TEMPERATURE: 98 F | WEIGHT: 261.2 LBS

## 2025-05-05 DIAGNOSIS — E11.22 TYPE 2 DIABETES MELLITUS WITH STAGE 4 CHRONIC KIDNEY DISEASE, WITH LONG-TERM CURRENT USE OF INSULIN (HCC): ICD-10-CM

## 2025-05-05 DIAGNOSIS — Z00.00 MEDICARE ANNUAL WELLNESS VISIT, SUBSEQUENT: Primary | ICD-10-CM

## 2025-05-05 DIAGNOSIS — N18.4 TYPE 2 DIABETES MELLITUS WITH STAGE 4 CHRONIC KIDNEY DISEASE, WITH LONG-TERM CURRENT USE OF INSULIN (HCC): ICD-10-CM

## 2025-05-05 DIAGNOSIS — Z79.4 TYPE 2 DIABETES MELLITUS WITH STAGE 4 CHRONIC KIDNEY DISEASE, WITH LONG-TERM CURRENT USE OF INSULIN (HCC): ICD-10-CM

## 2025-05-05 DIAGNOSIS — I10 PRIMARY HYPERTENSION: ICD-10-CM

## 2025-05-05 DIAGNOSIS — L60.8 TOENAIL DEFORMITY: ICD-10-CM

## 2025-05-05 PROCEDURE — G0439 PPPS, SUBSEQ VISIT: HCPCS | Performed by: STUDENT IN AN ORGANIZED HEALTH CARE EDUCATION/TRAINING PROGRAM

## 2025-05-05 NOTE — ASSESSMENT & PLAN NOTE
Lab Results   Component Value Date    HGBA1C 6.7 (H) 02/10/2025     -Patient not yet due for A1C.  -Ordered A1C to be done with upcoming labwork.    Orders:    Hemoglobin A1C; Future

## 2025-05-05 NOTE — PATIENT INSTRUCTIONS
Please record your blood pressures at home and bring record to next appointment.  Please have your outstanding labwork done including BMP and A1C before next visit.  I have placed a referral to podiatry.  Follow up in 2 weeks.  Medicare Preventive Visit Patient Instructions  Thank you for completing your Welcome to Medicare Visit or Medicare Annual Wellness Visit today. Your next wellness visit will be due in one year (5/6/2026).  The screening/preventive services that you may require over the next 5-10 years are detailed below. Some tests may not apply to you based off risk factors and/or age. Screening tests ordered at today's visit but not completed yet may show as past due. Also, please note that scanned in results may not display below.  Preventive Screenings:  Service Recommendations Previous Testing/Comments   Colorectal Cancer Screening  Colonoscopy    Fecal Occult Blood Test (FOBT)/Fecal Immunochemical Test (FIT)  Fecal DNA/Cologuard Test  Flexible Sigmoidoscopy Age: 45-75 years old   Colonoscopy: every 10 years (May be performed more frequently if at higher risk)  OR  FOBT/FIT: every 1 year  OR  Cologuard: every 3 years  OR  Sigmoidoscopy: every 5 years  Screening may be recommended earlier than age 45 if at higher risk for colorectal cancer. Also, an individualized decision between you and your healthcare provider will decide whether screening between the ages of 76-85 would be appropriate. Colonoscopy: 10/14/2024  FOBT/FIT: 11/04/2024  Cologuard: Not on file  Sigmoidoscopy: Not on file    Screening Current     Prostate Cancer Screening Individualized decision between patient and health care provider in men between ages of 55-69   Medicare will cover every 12 months beginning on the day after your 50th birthday PSA: No results in last 5 years           Hepatitis C Screening Once for adults born between 1945 and 1965  More frequently in patients at high risk for Hepatitis C Hep C Antibody:  04/06/2022    Screening Current   Diabetes Screening 1-2 times per year if you're at risk for diabetes or have pre-diabetes Fasting glucose: 101 mg/dL (2/14/2025)  A1C: 6.7 % (2/10/2025)  Screening Not Indicated  History Diabetes   Cholesterol Screening Once every 5 years if you don't have a lipid disorder. May order more often based on risk factors. Lipid panel: 02/14/2025  Screening Not Indicated  History Lipid Disorder      Other Preventive Screenings Covered by Medicare:  Abdominal Aortic Aneurysm (AAA) Screening: covered once if your at risk. You're considered to be at risk if you have a family history of AAA or a male between the age of 65-75 who smoking at least 100 cigarettes in your lifetime.  Lung Cancer Screening: covers low dose CT scan once per year if you meet all of the following conditions: (1) Age 55-77; (2) No signs or symptoms of lung cancer; (3) Current smoker or have quit smoking within the last 15 years; (4) You have a tobacco smoking history of at least 20 pack years (packs per day x number of years you smoked); (5) You get a written order from a healthcare provider.  Glaucoma Screening: covered annually if you're considered high risk: (1) You have diabetes OR (2) Family history of glaucoma OR (3)  aged 50 and older OR (4)  American aged 65 and older  Osteoporosis Screening: covered every 2 years if you meet one of the following conditions: (1) Have a vertebral abnormality; (2) On glucocorticoid therapy for more than 3 months; (3) Have primary hyperparathyroidism; (4) On osteoporosis medications and need to assess response to drug therapy.  HIV Screening: covered annually if you're between the age of 15-65. Also covered annually if you are younger than 15 and older than 65 with risk factors for HIV infection. For pregnant patients, it is covered up to 3 times per pregnancy.    Immunizations:  Immunization Recommendations   Influenza Vaccine Annual influenza vaccination  during flu season is recommended for all persons aged >= 6 months who do not have contraindications   Pneumococcal Vaccine   * Pneumococcal conjugate vaccine = PCV13 (Prevnar 13), PCV15 (Vaxneuvance), PCV20 (Prevnar 20)  * Pneumococcal polysaccharide vaccine = PPSV23 (Pneumovax) Adults 19-65 yo with certain risk factors or if 65+ yo  If never received any pneumonia vaccine: recommend Prevnar 20 (PCV20)  Give PCV20 if previously received 1 dose of PCV13 or PPSV23   Hepatitis B Vaccine 3 dose series if at intermediate or high risk (ex: diabetes, end stage renal disease, liver disease)   Respiratory syncytial virus (RSV) Vaccine - COVERED BY MEDICARE PART D  * RSVPreF3 (Arexvy) CDC recommends that adults 60 years of age and older may receive a single dose of RSV vaccine using shared clinical decision-making (SCDM)   Tetanus (Td) Vaccine - COST NOT COVERED BY MEDICARE PART B Following completion of primary series, a booster dose should be given every 10 years to maintain immunity against tetanus. Td may also be given as tetanus wound prophylaxis.   Tdap Vaccine - COST NOT COVERED BY MEDICARE PART B Recommended at least once for all adults. For pregnant patients, recommended with each pregnancy.   Shingles Vaccine (Shingrix) - COST NOT COVERED BY MEDICARE PART B  2 shot series recommended in those 19 years and older who have or will have weakened immune systems or those 50 years and older     Health Maintenance Due:      Topic Date Due    Colorectal Cancer Screening  01/12/2025    HIV Screening  Completed    Hepatitis C Screening  Completed     Immunizations Due:      Topic Date Due    COVID-19 Vaccine (3 - 2024-25 season) 09/01/2024     Advance Directives   What are advance directives?  Advance directives are legal documents that state your wishes and plans for medical care. These plans are made ahead of time in case you lose your ability to make decisions for yourself. Advance directives can apply to any medical  decision, such as the treatments you want, and if you want to donate organs.   What are the types of advance directives?  There are many types of advance directives, and each state has rules about how to use them. You may choose a combination of any of the following:  Living will:  This is a written record of the treatment you want. You can also choose which treatments you do not want, which to limit, and which to stop at a certain time. This includes surgery, medicine, IV fluid, and tube feedings.   Durable power of  for healthcare (DPAHC):  This is a written record that states who you want to make healthcare choices for you when you are unable to make them for yourself. This person, called a proxy, is usually a family member or a friend. You may choose more than 1 proxy.  Do not resuscitate (DNR) order:  A DNR order is used in case your heart stops beating or you stop breathing. It is a request not to have certain forms of treatment, such as CPR. A DNR order may be included in other types of advance directives.  Medical directive:  This covers the care that you want if you are in a coma, near death, or unable to make decisions for yourself. You can list the treatments you want for each condition. Treatment may include pain medicine, surgery, blood transfusions, dialysis, IV or tube feedings, and a ventilator (breathing machine).  Values history:  This document has questions about your views, beliefs, and how you feel and think about life. This information can help others choose the care that you would choose.  Why are advance directives important?  An advance directive helps you control your care. Although spoken wishes may be used, it is better to have your wishes written down. Spoken wishes can be misunderstood, or not followed. Treatments may be given even if you do not want them. An advance directive may make it easier for your family to make difficult choices about your care.   How to Quit Using  Smokeless Tobacco   Why it is important to stop using smokeless tobacco:  Smokeless tobacco comes in many forms. Examples include chew, snuff, dip, dissolvable tobacco, and snus. All smokeless tobacco products contain nicotine and may contain as much nicotine as 3 cigarettes. You may be physically dependent on nicotine. You may also be emotionally addicted to it. The cravings can be strong, but it is important to quit using smokeless tobacco. You will improve your health and decrease your cancer, stroke, and heart attack risk. Mouth sores and tooth problems will also improve when you quit. You can benefit from quitting no matter how long you have used smokeless tobacco.   Prepare to stop using smokeless tobacco:  Nicotine is a highly addictive drug. Withdrawal symptoms can happen when you stop and make it hard to quit. The following can help keep you on track:  Set a quit date.    Tell friends, family, and coworkers that you plan to quit.    Remove all smokeless tobacco products from your home, car, and workplace.    Manage weight gain after you quit:  Nicotine can affect your metabolism. You may gain a few pounds after you quit. The following can help you control your weight:  Eat healthy foods.    Drink water before, during, and between meals.    Exercise as directed.      Weight Management   Why it is important to manage your weight:  Being overweight increases your risk of health conditions such as heart disease, high blood pressure, type 2 diabetes, and certain types of cancer. It can also increase your risk for osteoarthritis, sleep apnea, and other respiratory problems. Aim for a slow, steady weight loss. Even a small amount of weight loss can lower your risk of health problems.  How to lose weight safely:  A safe and healthy way to lose weight is to eat fewer calories and get regular exercise. You can lose up about 1 pound a week by decreasing the number of calories you eat by 500 calories each day.    Healthy meal plan for weight management:  A healthy meal plan includes a variety of foods, contains fewer calories, and helps you stay healthy. A healthy meal plan includes the following:  Eat whole-grain foods more often.  A healthy meal plan should contain fiber. Fiber is the part of grains, fruits, and vegetables that is not broken down by your body. Whole-grain foods are healthy and provide extra fiber in your diet. Some examples of whole-grain foods are whole-wheat breads and pastas, oatmeal, brown rice, and bulgur.  Eat a variety of vegetables every day.  Include dark, leafy greens such as spinach, kale, jammie greens, and mustard greens. Eat yellow and orange vegetables such as carrots, sweet potatoes, and winter squash.   Eat a variety of fruits every day.  Choose fresh or canned fruit (canned in its own juice or light syrup) instead of juice. Fruit juice has very little or no fiber.  Eat low-fat dairy foods.  Drink fat-free (skim) milk or 1% milk. Eat fat-free yogurt and low-fat cottage cheese. Try low-fat cheeses such as mozzarella and other reduced-fat cheeses.  Choose meat and other protein foods that are low in fat.  Choose beans or other legumes such as split peas or lentils. Choose fish, skinless poultry (chicken or turkey), or lean cuts of red meat (beef or pork). Before you cook meat or poultry, cut off any visible fat.   Use less fat and oil.  Try baking foods instead of frying them. Add less fat, such as margarine, sour cream, regular salad dressing and mayonnaise to foods. Eat fewer high-fat foods. Some examples of high-fat foods include french fries, doughnuts, ice cream, and cakes.  Eat fewer sweets.  Limit foods and drinks that are high in sugar. This includes candy, cookies, regular soda, and sweetened drinks.  Exercise:  Exercise at least 30 minutes per day on most days of the week. Some examples of exercise include walking, biking, dancing, and swimming. You can also fit in more physical  activity by taking the stairs instead of the elevator or parking farther away from stores. Ask your healthcare provider about the best exercise plan for you.      © Copyright [employer] 2018 Information is for End User's use only and may not be sold, redistributed or otherwise used for commercial purposes. All illustrations and images included in CareNotes® are the copyrighted property of [employer] or "[employer]

## 2025-05-05 NOTE — ASSESSMENT & PLAN NOTE
-Patient with /82 today after recheck.  -Current regimen is Atenolol 25 mg daily, Nifedipine 90 mg daily.  -Last Creatinine 3.04    Plan:  -Given patient's elevated Cr, will defer adding additional medications at this time.  -Patient has BMP ordered - instructed patient to have done prior to next appointment.  -Encouraged patient to take home blood pressure logs and bring to next appointment.  -F/U in 2 weeks for BI QI  Blood Pressure after the second check 147/82  New diagnosis of hypertensionNo  Patient is already on antihypertensive medicationsYes  Patient is complaint with the antihypertensive medicationsNo (provide explanation): yes  Adjusting doses of antihypertensive medicationsNo  Adding new antihypertensive medicationsNo  Number of antihypertensive medications before the visit2  Number of antihypertensive medications after the visit2

## 2025-05-05 NOTE — PROGRESS NOTES
Name: Tommie Taylor      : 1965      MRN: [id number]  Encounter Provider: Layton Flores MD  Encounter Date: 2025   Encounter department: [facility] [place]  :  Assessment & Plan  Medicare annual wellness visit, subsequent         Type 2 diabetes mellitus with stage 4 chronic kidney disease, with long-term current use of insulin (HCC)    Lab Results   Component Value Date    HGBA1C 6.7 (H) 02/10/2025     -Patient not yet due for A1C.  -Ordered A1C to be done with upcoming labwork.    Orders:    Hemoglobin A1C; Future    Toenail deformity  -Patient requests referral to podiatry for care of his toenails.  -Overgrown toenails noted on exam.  -Podiatry referral placed.    Orders:    Ambulatory Referral to Podiatry; Future    Primary hypertension  -Patient with /82 today after recheck.  -Current regimen is Atenolol 25 mg daily, Nifedipine 90 mg daily.  -Last Creatinine 3.04    Plan:  -Given patient's elevated Cr, will defer adding additional medications at this time.  -Patient has BMP ordered - instructed patient to have done prior to next appointment.  -Encouraged patient to take home blood pressure logs and bring to next appointment.  -F/U in 2 weeks for BI QI  Blood Pressure after the second check 147/82  New diagnosis of hypertensionNo  Patient is already on antihypertensive medicationsYes  Patient is complaint with the antihypertensive medicationsNo (provide explanation): yes  Adjusting doses of antihypertensive medicationsNo  Adding new antihypertensive medicationsNo  Number of antihypertensive medications before the visit2  Number of antihypertensive medications after the visit2           Preventive health issues were discussed with patient, and age appropriate screening tests were ordered as noted in patient's After Visit Summary. Personalized health advice and appropriate referrals for health education or preventive services given if needed, as noted in  patient's After Visit Summary.    History of Present Illness     Patient is a 60 y/o male who presents today for AWV. He overall feels well with no symptoms at this visit. Notes that he is concerned with people telling him his BP is high. He is compliant with his atenolol, nifedipine. He is requesting a referral to podiatry for care of his toenails. No other concerns at this time. Notes his lower extremity edema is chronic and unchanged.        Patient Care Team:  Layton Flores MD as PCP - General (Internal Medicine)  Allie Grimes MD as PCP - PCP-[facility] ([facility])  Yony Crews, Pharmacist  Steven No MD (Nephrology)  Yinka Maier MD as Endoscopist    Review of Systems   Constitutional:  Negative for chills and fever.   HENT:  Negative for ear pain and sore throat.    Eyes:  Negative for pain and visual disturbance.   Respiratory:  Negative for cough and shortness of breath.    Cardiovascular:  Negative for chest pain and palpitations.   Gastrointestinal:  Negative for abdominal pain, constipation, diarrhea, nausea and vomiting.   Genitourinary:  Negative for dysuria and hematuria.   Musculoskeletal:  Negative for arthralgias and back pain.   Skin:  Negative for color change and rash.   Neurological:  Negative for seizures and syncope.   Psychiatric/Behavioral:  The patient is not nervous/anxious.    All other systems reviewed and are negative.    Medical History Reviewed by provider this encounter:       Annual Wellness Visit Questionnaire   Tommie is here for his Subsequent Wellness visit.     Health Risk Assessment:   Patient rates overall health as good. Patient feels that their physical health rating is same. Patient is satisfied with their life. Eyesight was rated as same. Hearing was rated as same. Patient feels that their emotional and mental health rating is same. Patients states they are never, rarely angry. Patient states they are never, rarely unusually tired/fatigued. Pain  experienced in the last 7 days has been none. Patient states that he has experienced no weight loss or gain in last 6 months.     Depression Screening:   PHQ-2 Score: 1      Fall Risk Screening:   In the past year, patient has experienced: history of falling in past year    Number of falls: 2 or more  Injured during fall?: Yes    Feels unsteady when standing or walking?: No    Worried about falling?: No      Home Safety:  Patient has trouble with stairs inside or outside of their home. Patient has working smoke alarms and has working carbon monoxide detector. Home safety hazards include: none.     Nutrition:   Current diet is Regular.     Medications:   Patient is currently taking over-the-counter supplements. OTC medications include: see medication list. Patient is able to manage medications.     Activities of Daily Living (ADLs)/Instrumental Activities of Daily Living (IADLs):   Walk and transfer into and out of bed and chair?: Yes  Dress and groom yourself?: Yes    Bathe or shower yourself?: Yes    Feed yourself? Yes  Do your laundry/housekeeping?: Yes  Manage your money, pay your bills and track your expenses?: Yes  Make your own meals?: Yes    Do your own shopping?: Yes    Preventive Screenings      Cardiovascular Screening:    General: Screening Not Indicated and History Lipid Disorder      Diabetes Screening:     General: Screening Not Indicated and History Diabetes      Colorectal Cancer Screening:     General: Screening Current      Abdominal Aortic Aneurysm (AAA) Screening:    Risk factors include: tobacco use        Lung Cancer Screening:     General: Screening Not Indicated      Hepatitis C Screening:    General: Screening Current    Screening, Brief Intervention, and Referral to Treatment (SBIRT)     Screening  Typical number of drinks in a day: 0  Typical number of drinks in a week: 0  Interpretation: Low risk drinking behavior.    Single Item Drug Screening:  How often have you used an illegal drug  "(including marijuana) or a prescription medication for non-medical reasons in the past year? never    Single Item Drug Screen Score: 0  Interpretation: Negative screen for possible drug use disorder    Social Drivers of Health     Financial Resource Strain: Low Risk  (5/5/2025)    Overall Financial Resource Strain (CARDIA)     Difficulty of Paying Living Expenses: Not hard at all   Food Insecurity: No Food Insecurity (5/5/2025)    Hunger Vital Sign     Worried About Running Out of Food in the Last Year: Never true     Ran Out of Food in the Last Year: Never true   Transportation Needs: No Transportation Needs (5/5/2025)    PRAPARE - Transportation     Lack of Transportation (Medical): No     Lack of Transportation (Non-Medical): No   Housing Stability: Low Risk  (5/5/2025)    Housing Stability Vital Sign     Unable to Pay for Housing in the Last Year: No     Number of Times Moved in the Last Year: 1     Homeless in the Last Year: No   Utilities: Not At Risk (5/5/2025)    [facility] Utilities     Threatened with loss of utilities: No     No results found.    Objective   /82 (BP Location: Right arm, Patient Position: Sitting)   Pulse 63   Temp 98 °F (36.7 °C) (Temporal)   Ht 5' 6\" (1.676 m)   Wt 118 kg (261 lb 3.2 oz)   SpO2 98%   BMI 42.16 kg/m²     Physical Exam  Vitals and nursing note reviewed.   Constitutional:       General: He is not in acute distress.     Appearance: He is well-developed.   HENT:      Head: Normocephalic and atraumatic.      Right Ear: External ear normal.      Left Ear: External ear normal.      Nose: Nose normal.      Mouth/Throat:      Mouth: Mucous membranes are moist.   Eyes:      Conjunctiva/sclera: Conjunctivae normal.   Cardiovascular:      Rate and Rhythm: Normal rate and regular rhythm.      Heart sounds: No murmur heard.  Pulmonary:      Effort: Pulmonary effort is normal. No respiratory distress.      Breath sounds: Normal breath sounds.   Abdominal:      Palpations: Abdomen " is soft.      Tenderness: There is no abdominal tenderness.   Musculoskeletal:         General: No swelling.      Cervical back: Neck supple.      Right lower leg: Edema present.      Left lower leg: Edema present.   Skin:     General: Skin is warm and dry.      Capillary Refill: Capillary refill takes less than 2 seconds.      Comments: Overgrown toenails noted.   Neurological:      General: No focal deficit present.      Mental Status: He is alert and oriented to person, place, and time.   Psychiatric:         Mood and Affect: Mood normal.

## 2025-05-06 ENCOUNTER — OFFICE VISIT (OUTPATIENT)
Dept: OCCUPATIONAL THERAPY | Facility: CLINIC | Age: 60
End: 2025-05-06
Payer: COMMERCIAL

## 2025-05-06 DIAGNOSIS — R29.898 WEAKNESS OF RIGHT ARM: ICD-10-CM

## 2025-05-06 DIAGNOSIS — Z86.73 HISTORY OF CVA (CEREBROVASCULAR ACCIDENT): Primary | ICD-10-CM

## 2025-05-06 PROCEDURE — 97140 MANUAL THERAPY 1/> REGIONS: CPT

## 2025-05-06 PROCEDURE — 97110 THERAPEUTIC EXERCISES: CPT

## 2025-05-06 NOTE — PROGRESS NOTES
Name: Tommie Taylor      : 1965      MRN: [id number]  Encounter Provider: Kaveh Saavedra DO  Encounter Date: 2025   Encounter department: [facility]  :  Assessment & Plan  CKD (chronic kidney disease) stage 4, GFR 15-29 ml/min (HCC)  Lab Results   Component Value Date    EGFR 18 2025    EGFR 21 2025    EGFR 30 02/10/2025    CREATININE 3.50 (H) 2025    CREATININE 3.04 (H) 2025    CREATININE 2.27 (H) 02/10/2025       59-year-old male referred for dialysis access creation.  He has stage IV CKD his nephrologist feels as though he is approaching dialysis and sent him for access creation.  He is right-hand dominant unfortunately had a recent stroke that affected affected his right upper extremity he has significant disability in his right arm and he is unable to fully extend his right elbow or rotate externally his right shoulder.  He is adapting and learning to do more with his left hand which is now his obviously dominant hand.  Unfortunately because of his contracture and limited mobility in the right upper extremity I do not believe that there is any way to create a functional access he would not be able to position his extremity for cannulation in the dialysis unit.  He did have vein mapping done today which was reviewed by myself unfortunately his upper arm basilic and cephalic veins are both small caliber for fistula creation.  I discussed with him remapping his arm in the operating room and proceeding with a left upper extremity AV fistula and likely AV graft.  His significant other is on dialysis and he is well aware of the process I will schedule him for a left upper extremity AV fistula possible AV graft after discussing all risks benefits and alternative therapies with him in detail he consented to proceed.    Orders:    Ambulatory Referral to Vascular Surgery    Case request operating room: left arm AVF creation, possible AV graft;  "Standing        History of Present Illness     New patient, presents today to discuss HD access. Vein mapping done earlier today. R handed, however has residual L arm/hand weakness secondary to CVA that occurred 10/2024. Not on dialysis.     HPI  Tommie Taylor is a 59 y.o. male   History obtained from: patient    Review of Systems   Constitutional: Negative.    HENT: Negative.     Eyes: Negative.    Respiratory: Negative.     Cardiovascular: Negative.    Gastrointestinal: Negative.    Endocrine: Negative.    Genitourinary: Negative.    Musculoskeletal: Negative.    Skin: Negative.    Allergic/Immunologic: Negative.    Neurological:  Positive for weakness (R arm).   Hematological: Negative.    Psychiatric/Behavioral: Negative.       I have reviewed the ROS above and made changes as needed.         Objective   /90 (BP Location: Left arm, Patient Position: Sitting)   Pulse 58   Ht 5' 6\" (1.676 m)   Wt 118 kg (261 lb)   BMI 42.13 kg/m²      Physical Exam    General  Exam: alert, awake, oriented, no distress, consistent with stated age    Integumentary  Exam: warm, dry, no gross lesions, no bruises and normal color    Chest and Lung  Exam: chest normal without deformity, bilaterally expansive, clear to auscultation    Cardiovascular  Exam: regular rate, regular rhythm, no murmurs, no rubs or gallops    Adbomen  Exam: soft, non-tender, non-distended, no pulsatile abdominal masses, no abdominal bruit    Upper Extremity:  Palpation: Radial pulse- Bilateral 2+  RUE contracture at elbow and shoulder joint, limited elbow ROM and shoulder ROM      Administrative Statements   I have spent a total time of 30 minutes in caring for this patient on the day of the visit/encounter including Counseling / Coordination of care, Documenting in the medical record, and Reviewing/placing orders in the medical record (including tests, medications, and/or procedures).        Operative Scheduling Information:    Hospital:  [facility]    Physician:  Quentin    Surgery: left arm AVF creation, possible AV graft    Urgency:  Standard    Level:  Level 3: Outpatients to be scheduled for elective surgery than can be delayed up to 4 weeks without reasonable expectation of detriment to patient    Case Length:  Normal    Post-op Bed:  Outpatient    OR Table:  Standard    Equipment Needs:  None    Medication Instructions:  Aspirin:   Continue (do not hold)    Hydration:  No    Contrast Allergy:  no

## 2025-05-06 NOTE — PROGRESS NOTES
Daily Note     Today's date: 2025  Patient name: Tommie Taylor  : 1965  MRN: [id number]  Referring provider: Harshad Romo*  Dx:   Encounter Diagnosis     ICD-10-CM    1. History of CVA (cerebrovascular accident)  Z86.73       2. Weakness of right arm  R29.898                    POC expires Unit limit Auth Expiration date PT/OT/ST + Visit Limit?   3/27/25 N/A 25 BOMN                           Visit/Unit Tracking  AUTH Status:  Date  2 PN 3/5 3/12 3/19   BOMN Used 1 2 3 4 5 6 7 1 2 3    Remaining  7 6 5 4 3 2 1 7 6   5      Visit/Unit Tracking  AUTH Status:  Date 3/21 3/26 RE   PN     BOMN Used 4 5 1 2 3 4 5 6 7     Remaining  4 3 7 6 5 4 3 2 1        Duration in weeks: 8 weeks  Plan of care beginning date: 3/26/25  Plan of care expiration date: 25    Re-eval: 25    Precautions: CVA, HTN, high BP, Orthostatic     Subjective: I have to sit up Brianne, my back. (Positioning)      Objective: See treatment diary below. Pt participated in skilled OT this date with focus to UE strength/endurance, proximal stability,  strength, increased ROM, and HEP development, for increased safety and engagement in ADL/IADL tasks.     Manual: Pt tolerated Pt tolerated instrument assisted soft tissue massage with #3 to biceps, lateral delt, and elbow crease, for increased circulation, decreased tightness, decreased tone, and increased blood flow, for increased ROM with functional activity.     There Exc: Pt tolerated PLLS in all planes while supine with discomfort noted in FF, however, pt wanting therapist to continue past pain. Decreased ROM noted in abduction as well as increased tightness noted in elbow crease, with therapist incorporating IASTM. Pt engaged in pink thera band extension while in supine,  with no A for 2 x 10 and min difficulty noted with pt making noises and having minimal discomfort. Pt then completed flexion with mod  A after activation for full ROM, completing 2 x 10. Pt also completed abduction 2 x 10. Pt requiring mod verbal cues for form, breathing strategies, and safety while completing exercises. Increased breaks taken throughout.    Assessment: Tolerated treatment well. Therapist noting shoulder/chest compensation when completing a couple reps seated EOM as opposed to supine on stephani. Patient would benefit from continued OT      Plan: Continue per plan of care.

## 2025-05-07 ENCOUNTER — TELEPHONE (OUTPATIENT)
Dept: VASCULAR SURGERY | Facility: CLINIC | Age: 60
End: 2025-05-07

## 2025-05-07 ENCOUNTER — APPOINTMENT (OUTPATIENT)
Dept: LAB | Facility: CLINIC | Age: 60
End: 2025-05-07
Attending: PHYSICIAN ASSISTANT
Payer: COMMERCIAL

## 2025-05-07 ENCOUNTER — HOSPITAL ENCOUNTER (OUTPATIENT)
Dept: NON INVASIVE DIAGNOSTICS | Facility: CLINIC | Age: 60
Discharge: HOME | End: 2025-05-07
Payer: COMMERCIAL

## 2025-05-07 ENCOUNTER — CONSULT (OUTPATIENT)
Dept: VASCULAR SURGERY | Facility: CLINIC | Age: 60
End: 2025-05-07
Payer: COMMERCIAL

## 2025-05-07 VITALS
DIASTOLIC BLOOD PRESSURE: 90 MMHG | HEIGHT: 66 IN | HEART RATE: 58 BPM | BODY MASS INDEX: 41.95 KG/M2 | SYSTOLIC BLOOD PRESSURE: 146 MMHG | WEIGHT: 261 LBS

## 2025-05-07 DIAGNOSIS — N18.4 CKD (CHRONIC KIDNEY DISEASE) STAGE 4, GFR 15-29 ML/MIN (HCC): ICD-10-CM

## 2025-05-07 DIAGNOSIS — E83.9 CHRONIC KIDNEY DISEASE-MINERAL AND BONE DISORDER (CKD-MBD): ICD-10-CM

## 2025-05-07 DIAGNOSIS — Z79.4 TYPE 2 DIABETES MELLITUS WITH STAGE 4 CHRONIC KIDNEY DISEASE, WITH LONG-TERM CURRENT USE OF INSULIN (HCC): ICD-10-CM

## 2025-05-07 DIAGNOSIS — Z01.818 ENCOUNTER FOR OTHER PREPROCEDURAL EXAMINATION: ICD-10-CM

## 2025-05-07 DIAGNOSIS — N18.4 TYPE 2 DIABETES MELLITUS WITH STAGE 4 CHRONIC KIDNEY DISEASE, WITH LONG-TERM CURRENT USE OF INSULIN (HCC): ICD-10-CM

## 2025-05-07 DIAGNOSIS — D63.1 ANEMIA DUE TO STAGE 4 CHRONIC KIDNEY DISEASE  (HCC): ICD-10-CM

## 2025-05-07 DIAGNOSIS — E11.22 TYPE 2 DIABETES MELLITUS WITH STAGE 4 CHRONIC KIDNEY DISEASE, WITH LONG-TERM CURRENT USE OF INSULIN (HCC): ICD-10-CM

## 2025-05-07 DIAGNOSIS — N18.4 ANEMIA DUE TO STAGE 4 CHRONIC KIDNEY DISEASE  (HCC): ICD-10-CM

## 2025-05-07 DIAGNOSIS — N18.9 CHRONIC KIDNEY DISEASE-MINERAL AND BONE DISORDER (CKD-MBD): ICD-10-CM

## 2025-05-07 DIAGNOSIS — M89.9 CHRONIC KIDNEY DISEASE-MINERAL AND BONE DISORDER (CKD-MBD): ICD-10-CM

## 2025-05-07 LAB
ANION GAP SERPL CALCULATED.3IONS-SCNC: 3 MMOL/L (ref 4–13)
BUN SERPL-MCNC: 27 MG/DL (ref 5–25)
CALCIUM SERPL-MCNC: 8.6 MG/DL (ref 8.4–10.2)
CHLORIDE SERPL-SCNC: 108 MMOL/L (ref 96–108)
CO2 SERPL-SCNC: 26 MMOL/L (ref 21–32)
CREAT SERPL-MCNC: 3.42 MG/DL (ref 0.6–1.3)
ERYTHROCYTE [DISTWIDTH] IN BLOOD BY AUTOMATED COUNT: 15.1 % (ref 11.6–15.1)
GFR SERPL CREATININE-BSD FRML MDRD: 18 ML/MIN/1.73SQ M
GLUCOSE P FAST SERPL-MCNC: 100 MG/DL (ref 65–99)
HCT VFR BLD AUTO: 34.6 % (ref 36.5–49.3)
HGB BLD-MCNC: 11.2 G/DL (ref 12–17)
MAGNESIUM SERPL-MCNC: 1.8 MG/DL (ref 1.9–2.7)
MCH RBC QN AUTO: 25.7 PG (ref 26.8–34.3)
MCHC RBC AUTO-ENTMCNC: 32.4 G/DL (ref 31.4–37.4)
MCV RBC AUTO: 79 FL (ref 82–98)
PHOSPHATE SERPL-MCNC: 3.6 MG/DL (ref 2.7–4.5)
PLATELET # BLD AUTO: 197 THOUSANDS/UL (ref 149–390)
PMV BLD AUTO: 9.9 FL (ref 8.9–12.7)
POTASSIUM SERPL-SCNC: 4.9 MMOL/L (ref 3.5–5.3)
RBC # BLD AUTO: 4.36 MILLION/UL (ref 3.88–5.62)
SODIUM SERPL-SCNC: 137 MMOL/L (ref 135–147)
WBC # BLD AUTO: 6.82 THOUSAND/UL (ref 4.31–10.16)

## 2025-05-07 PROCEDURE — 80048 BASIC METABOLIC PNL TOTAL CA: CPT

## 2025-05-07 PROCEDURE — 99205 OFFICE O/P NEW HI 60 MIN: CPT | Performed by: SURGERY

## 2025-05-07 PROCEDURE — 93985 DUP-SCAN HEMO COMPL BI STD: CPT | Performed by: SURGERY

## 2025-05-07 PROCEDURE — 83735 ASSAY OF MAGNESIUM: CPT

## 2025-05-07 PROCEDURE — 83036 HEMOGLOBIN GLYCOSYLATED A1C: CPT

## 2025-05-07 PROCEDURE — 85027 COMPLETE CBC AUTOMATED: CPT

## 2025-05-07 PROCEDURE — 36415 COLL VENOUS BLD VENIPUNCTURE: CPT

## 2025-05-07 PROCEDURE — 84100 ASSAY OF PHOSPHORUS: CPT

## 2025-05-07 PROCEDURE — 93985 DUP-SCAN HEMO COMPL BI STD: CPT

## 2025-05-07 RX ORDER — CEFAZOLIN SODIUM 2 G/50ML
2000 SOLUTION INTRAVENOUS ONCE
OUTPATIENT
Start: 2025-05-07 | End: 2025-05-07

## 2025-05-07 RX ORDER — CHLORHEXIDINE GLUCONATE ORAL RINSE 1.2 MG/ML
15 SOLUTION DENTAL ONCE
OUTPATIENT
Start: 2025-05-07 | End: 2025-05-07

## 2025-05-07 NOTE — ASSESSMENT & PLAN NOTE
Lab Results   Component Value Date    EGFR 18 02/14/2025    EGFR 21 02/11/2025    EGFR 30 02/10/2025    CREATININE 3.50 (H) 02/14/2025    CREATININE 3.04 (H) 02/11/2025    CREATININE 2.27 (H) 02/10/2025       59-year-old male referred for dialysis access creation.  He has stage IV CKD his nephrologist feels as though he is approaching dialysis and sent him for access creation.  He is right-hand dominant unfortunately had a recent stroke that affected affected his right upper extremity he has significant disability in his right arm and he is unable to fully extend his right elbow or rotate externally his right shoulder.  He is adapting and learning to do more with his left hand which is now his obviously dominant hand.  Unfortunately because of his contracture and limited mobility in the right upper extremity I do not believe that there is any way to create a functional access he would not be able to position his extremity for cannulation in the dialysis unit.  He did have vein mapping done today which was reviewed by myself unfortunately his upper arm basilic and cephalic veins are both small caliber for fistula creation.  I discussed with him remapping his arm in the operating room and proceeding with a left upper extremity AV fistula and likely AV graft.  His significant other is on dialysis and he is well aware of the process I will schedule him for a left upper extremity AV fistula possible AV graft after discussing all risks benefits and alternative therapies with him in detail he consented to proceed.    Orders:    Ambulatory Referral to Vascular Surgery    Case request operating room: left arm AVF creation, possible AV graft; Standing

## 2025-05-07 NOTE — TELEPHONE ENCOUNTER
REMINDER: Under Reason For Call, comments MUST be formatted as:   (Surgeon's Initials) / (Procedure)      Special Instructions/FYI/Dialysis Days: Level 3    Clearances: None    Consent: I certify that patient has signed, printed, timed, and dated their surgery consent.  I certify that the patient's LEGAL NAME and DATE OF BIRTH are written in the upper left corner on BOTH sides of the consent.  I certify that BOTH sides of the completed surgery consent have been scanned into the patient's Epic chart by myself on 5/7/2025.  Yes, I have LABELED the consent in Epic as Consent for Vascular Procedure.     For Surgical Clearances     Levels   1-3   ROUTE this encounter to [facility] Surgery Coordinator Pool     Level   4   ROUTE this encounter to [facility] Surgery Coordinator Pool       HYDRATION CLEARANCES   ONLY ROUTE TO  [facility] Surgery Coordinator Pool       Yes, I have ROUTED this encounter to [facility] Surgery Coordinator.             Detail Level: Detailed Size Of Lesion: 2.5mm x 3mm

## 2025-05-07 NOTE — TELEPHONE ENCOUNTER
Operative Scheduling Information:     Hospital:  [facility]     Physician:  Quentin     Surgery: left arm AVF creation, possible AV graft     Urgency:  Standard     Level:  Level 3: Outpatients to be scheduled for elective surgery than can be delayed up to 4 weeks without reasonable expectation of detriment to patient     Case Length:  Normal     Post-op Bed:  Outpatient     OR Table:  Standard     Equipment Needs:  None     Medication Instructions:  Aspirin:   Continue (do not hold)     Hydration:  No     Contrast Allergy:  no

## 2025-05-08 ENCOUNTER — APPOINTMENT (OUTPATIENT)
Dept: OCCUPATIONAL THERAPY | Facility: CLINIC | Age: 60
End: 2025-05-08
Payer: COMMERCIAL

## 2025-05-08 ENCOUNTER — RESULTS FOLLOW-UP (OUTPATIENT)
Dept: OTHER | Facility: HOSPITAL | Age: 60
End: 2025-05-08

## 2025-05-08 LAB
EST. AVERAGE GLUCOSE BLD GHB EST-MCNC: 126 MG/DL
HBA1C MFR BLD: 6 %

## 2025-05-12 ENCOUNTER — CONSULT (OUTPATIENT)
Dept: MULTI SPECIALTY CLINIC | Facility: CLINIC | Age: 60
End: 2025-05-12

## 2025-05-12 VITALS
HEART RATE: 73 BPM | BODY MASS INDEX: 42.29 KG/M2 | TEMPERATURE: 98 F | SYSTOLIC BLOOD PRESSURE: 144 MMHG | WEIGHT: 262 LBS | DIASTOLIC BLOOD PRESSURE: 85 MMHG | OXYGEN SATURATION: 98 %

## 2025-05-12 DIAGNOSIS — R22.43 LOCALIZED SWELLING OF BOTH FEET: Primary | ICD-10-CM

## 2025-05-12 DIAGNOSIS — Z79.4 TYPE 2 DIABETES MELLITUS WITH STAGE 4 CHRONIC KIDNEY DISEASE, WITH LONG-TERM CURRENT USE OF INSULIN (HCC): ICD-10-CM

## 2025-05-12 DIAGNOSIS — L60.8 TOENAIL DEFORMITY: ICD-10-CM

## 2025-05-12 DIAGNOSIS — N18.4 TYPE 2 DIABETES MELLITUS WITH STAGE 4 CHRONIC KIDNEY DISEASE, WITH LONG-TERM CURRENT USE OF INSULIN (HCC): ICD-10-CM

## 2025-05-12 DIAGNOSIS — E11.22 TYPE 2 DIABETES MELLITUS WITH STAGE 4 CHRONIC KIDNEY DISEASE, WITH LONG-TERM CURRENT USE OF INSULIN (HCC): ICD-10-CM

## 2025-05-12 DIAGNOSIS — B35.1 ONYCHOMYCOSIS: ICD-10-CM

## 2025-05-12 NOTE — PROGRESS NOTES
[facility]- Podiatry   Tommie Taylor 59 y.o. male MRN: [id number]  Encounter: [id number]    Assessment & Plan     Assessment:  1. Onychomycosis  2. T2DM  3. Diabetic foot examination  4. Localized swelling    Plan:  - Patient was seen/examined. All questions and concerns addressed  - Nails x10 were sharply trimmed to normal length and thickness with a large nail nipper without incident.  - Diabetic foot evaluation: Patient's lower extremities are of low diabetic risk since his sensation is intact and pulses are palpable  - Educated the patient on proper diabetic footcare, checking his feet every day, wearing well-fitting spaces shoes even while in the home, tight glycemic control and exercise.  Patient was advised to follow-up with his family doctor/endocrinologist for continued diabetic medical treatment.    -Stressed the importance of glycemic control, shoe gear, and proper diabetic foot care.  - RTC in 3 months months        Nail debridement     Date/Time  5/12/2025 2:15 PM     Performed by  Brandan Garcia DPM   Authorized by  Brandan Garcia DPM     Universal Protocol   Consent: Verbal consent obtained.  Risks and benefits: risks, benefits and alternatives were discussed  Consent given by: patient  Timeout called at: 5/12/2025 1:16 PM.  Patient understanding: patient states understanding of the procedure being performed  Patient consent: the patient's understanding of the procedure matches consent given  Procedure consent: procedure consent matches procedure scheduled     Procedure Details   Procedure Notes: Nails x 10 debrided using a sterile nail nipper without incident               Diabetic Foot Exam    Patient's shoes and socks removed.    Right Foot/Ankle   Right Foot Inspection  Skin Exam: skin normal and skin intact. No dry skin, no warmth, no callus, no erythema, no maceration, no abnormal color, no pre-ulcer, no ulcer and no callus.     Toe Exam: ROM and strength within normal limits. No  tenderness and  no right toe deformity    Sensory   Vibration: intact  Proprioception: intact  Monofilament testing: intact    Vascular  Capillary refills: < 3 seconds  The right DP pulse is 2+. The right PT pulse is 2+.     Left Foot/Ankle  Left Foot Inspection  Skin Exam: skin normal and skin intact. No dry skin, no warmth, no erythema, no maceration, normal color, no pre-ulcer, no ulcer and no callus.     Toe Exam: ROM and strength within normal limits. No tenderness and no left toe deformity.     Sensory   Vibration: intact  Proprioception: intact  Monofilament testing: intact    Vascular  Capillary refills: < 3 seconds  The left DP pulse is 2+. The left PT pulse is 2+.     Assign Risk Category  No deformity present  No loss of protective sensation  No weak pulses  Risk: 0      History of Present Illness     HPI:  Tommie Taylor is a 59 y.o. male who presents with elongated toenails. States that their nails are painful, elongated. They have difficulty applying their socks and shoes. The pressure within their shoe gear is painful and they have been unable to cut their nails adequately. Patient denies numbness/tingling. The patient denies any nausea, vomiting, fever, chills, shortness of breath, or chest pains.      Review of Systems   Constitutional: Negative.    HENT: Negative.    Eyes: Negative.    Respiratory: Negative.    Cardiovascular: Negative.    Gastrointestinal: Negative.    Musculoskeletal: Negative   Skin: elongated thickened toenails.   Neurological: Negative.        Historical Information   Past Medical History:   Diagnosis Date    ADHD, adult residual type     Anxiety     Anxiety     Bipolar 1 disorder (HCC)     Cataract     Left eye    Chronic kidney disease     Colon polyp     CPAP (continuous positive airway pressure) dependence     Depression     Depression     Diabetes mellitus (HCC)     blood sugar 167 on admission    Equinus deformity of foot     GERD (gastroesophageal reflux disease)      "Homicidal ideations     Hyperlipidemia     Hypertension     Microalbuminuria     Morbid obesity (HCC)     Neuropathy     Shortness of breath     Sleep apnea     CPAP at bedtime    Sleep apnea     Stroke (HCC)     Suicidal intent      Past Surgical History:   Procedure Laterality Date    CATARACT EXTRACTION      CATARACT EXTRACTION      COLONOSCOPY      HAND SURGERY Right     OTHER SURGICAL HISTORY      REPAIR OF SUPERFICIAL WOUND FACE    IL ESOPHAGOGASTRODUODENOSCOPY TRANSORAL DIAGNOSTIC N/A 2018    Procedure: ESOPHAGOGASTRODUODENOSCOPY (EGD);  Surgeon: Yinka Maier MD;  Location: [facility];  Service: Gastroenterology    IL ESOPHAGOGASTRODUODENOSCOPY TRANSORAL DIAGNOSTIC N/A 2018    Procedure: EGD AND COLONOSCOPY;  Surgeon: Yinka Maier MD;  Location: [facility];  Service: Gastroenterology     Social History   Social History     Substance and Sexual Activity   Alcohol Use Not Currently    Comment: rarely     Social History     Substance and Sexual Activity   Drug Use Not Currently    Comment: quit 20 years ago     Social History     Tobacco Use   Smoking Status Former    Current packs/day: 0.00    Types: Cigarettes    Quit date:     Years since quittin.3   Smokeless Tobacco Current    Types: Chew   Tobacco Comments    pt \"Quit after approx over 25 years ago\"     Family History:   Family History   Problem Relation Age of Onset    Diabetes Mother     Alcohol abuse Father     Diabetes Father     Hypertension Father     Lung cancer Father     Stroke Father     Cancer Father         lung    Alcohol abuse Sister     Depression Sister     Lymphoma Sister     Alcohol abuse Family     Alcohol abuse Sister     Alcohol abuse Sister     Cancer Sister     Heart disease Neg Hx     Thyroid disease Neg Hx        Meds/Allergies   Not in a hospital admission.  Allergies   Allergen Reactions    Pollen Extract Nasal Congestion    Tetanus Toxoid Swelling       Objective     Current Vitals:            There were no " vitals taken for this visit.    General Appearance: Alert, cooperative, no distress.  HEENT: Head normocephalic, atraumatic, without obvious abnormality.  Heart: Normal rate and rhythm.  No murmurs or gallops noted.  Lungs: Non-labored breathing. No respiratory distress.  No wheezing, rhonchi, or rales.  Abdomen:  Soft, nontender, without distension.  Psychiatric: AAOx3    Lower Extremity Exam:    Vascular:   Right Pt pulse is present  Right DP pulse is present  Left PT pulse is present  Left DP pulse is present.   CRT < 3 seconds at the digits.   2/4 edema noted at bilateral lower extremities.   Pedal hair is present. Skin temperature is WNL bilaterally.    Musculoskeletal:  MMT is 4+/5 in all muscle compartments bilaterally.   ROM at the 1st MPJ and ankle joint are WNL bilaterally with the leg extended.   No gross deformities noted.     Dermatological:  Nails x 10 are elongated, painful, discolored, dystrophic, with subungual debris.    Neurological:  Gross sensation is intact.   Protective sensation is intact.   Patient Denies numbness and/or paresthesia

## 2025-05-14 ENCOUNTER — PREP FOR PROCEDURE (OUTPATIENT)
Dept: VASCULAR SURGERY | Facility: CLINIC | Age: 60
End: 2025-05-14

## 2025-05-14 NOTE — TELEPHONE ENCOUNTER
Verified patient's insurance   CONFIRMED - Patient's insurance is [employer]   Is patient requesting a call when authorization has been obtained? Patient did not request a call.    Surgery Date: 6/12/25  Primary Surgeon: LUCITA // Kaveh Saavedra (NPI: [id number])  Assisting Surgeon: Not Applicable (N/A)  Facility: [facility] (Tax: [id number] / NPI: [id number])  Inpatient / Outpatient: Outpatient  Level: 3    Clearance Received: No clearance ordered.  Consent Received: Yes, scanned into Epic on 5/7/25.  Medication Hold / Last Dose: No hold on ASA  IR Notified: Not Applicable (N/A)  Rep. Notified: Not Applicable (N/A)  Equipment Needs: Not Applicable (N/A)  Vas Lab Requested: Not Applicable (N/A)  Patient Contacted: 5/14/25 - Wife     Lyft Ride: NO  Pickup Date/Time: Not Applicable (N/A)    Diagnosis: N18.4  Procedure/ CPT Code(s): (AV) Arteriovenous Fistula // CPT: 06393    For varicose vein related procedures:   Last LEVDR: Not Applicable (N/A)  CEAP Classification: Not Applicable (N/A)  VCSS: Not Applicable (N/A)  PICTURES: Not Applicable (N/A)    Post Operative Date/ Time: 7/2/25 , 230p [place] ([place]) with Kaveh Saavedra (NPI: [id number]) - Patient aware     *Please review medication hold(s), PATs, and check H&P with patient.*  PATIENT WAS MAILED SURGERY/SHOWERING/DISCHARGE/COVID INSTRUCTIONS AFTER REVIEWING WITH THEM VIA PHONE CALL.

## 2025-05-15 ENCOUNTER — OFFICE VISIT (OUTPATIENT)
Dept: OCCUPATIONAL THERAPY | Facility: CLINIC | Age: 60
End: 2025-05-15
Payer: COMMERCIAL

## 2025-05-15 DIAGNOSIS — R29.898 WEAKNESS OF RIGHT ARM: ICD-10-CM

## 2025-05-15 DIAGNOSIS — Z86.73 HISTORY OF CVA (CEREBROVASCULAR ACCIDENT): Primary | ICD-10-CM

## 2025-05-15 PROCEDURE — 97110 THERAPEUTIC EXERCISES: CPT

## 2025-05-15 PROCEDURE — 97112 NEUROMUSCULAR REEDUCATION: CPT

## 2025-05-15 NOTE — PROGRESS NOTES
"Occupational Therapy Daily Note:    Today's date: 5/15/2025  Patient name: Tommie Taylor  : 1965  MRN: [id number]  Referring provider: Harshad Romo*  Dx:   Encounter Diagnoses   Name Primary?    History of CVA (cerebrovascular accident) Yes    Weakness of right arm        Start Time: 1345  Stop Time: 1430  Total time in clinic (min): 45 minutes            POC expires Unit limit Auth Expiration date PT/OT/ST + Visit Limit?   3/27/25 N/A 25 BOMN                           Visit/Unit Tracking  AUTH Status:  Date  2 PN 3/5 3/12 3/19   BOMN Used 1 2 3 4 5 6 7 1 2 3    Remaining  7 6 5 4 3 2 1 7 6   5      Visit/Unit Tracking  AUTH Status:  Date 3/21 3/26 RE 4/  PN 5/6 5/15   BOMN Used 4 5 1 2 3 4 5 6 1 2    Remaining  4 3 7 6 5 4 3 2 7 6     Visit/Unit Tracking  AUTH Status:  Date              BOMN Used               Remaining                     Duration in weeks: 8 weeks  Plan of care beginning date: 3/26/25  Plan of care expiration date: 25    Re-eval: 25  Precautions: CVA, HTN, high BP, Orthostatic    Subjective: \"You can do it [place]\".    Objective: See treatment below.  Session focusing motor skills, UE strength, stability and overall activity endurance improving indep in ADL/IADL mngt.      Neuro Re-ed: In unsupported stance donning 2# CW, pt engaged in BB toss w/targeted demands focusing on automaticity of open/closed hand, fxl reaching w/arm swing, proximal strength/stability and overall activity endurance completing 2 trials.  Pt able to grasp and release BB achieving target <75% of time.   Pt retrieved BB using LHR w/o difficulty.     Thera Ex: Pt engaged in UBE x 5 min prograde x 5 min retrograde w/o resist focusing on increasing proximal RUE strength, cardiopulmonary endurance and sustained grasp.    Thera Act: Seated, Pt completed FM task retrieving small foam puzzle pieces and placing to target board positioned " midline of pt focusing on pinch patterns and elbow extension.  Improvement in tricep activation noted w/decreased trunk compensation during reaching. Pt retrieved foam puzzle pieces using pad pinch, able to sustain to target, difficulty noted w/target accuracy.       Assessment: Tolerated treatment well. Pt noted to engage trunk when tossing BB providing increased range during toss vs increased arm swing.  With verbal cues to increase arm swing, pt able to hit target >75% of time.  Pt able to toss BB demo-increased automaticity of arm swing and open/closed hand w/repetition of task. Patient would benefit from continued skilled OT.    Plan: Continued skilled OT per POC

## 2025-05-20 ENCOUNTER — OFFICE VISIT (OUTPATIENT)
Dept: INTERNAL MEDICINE CLINIC | Facility: CLINIC | Age: 60
End: 2025-05-20

## 2025-05-20 ENCOUNTER — EVALUATION (OUTPATIENT)
Dept: OCCUPATIONAL THERAPY | Facility: CLINIC | Age: 60
End: 2025-05-20
Payer: COMMERCIAL

## 2025-05-20 VITALS
SYSTOLIC BLOOD PRESSURE: 140 MMHG | OXYGEN SATURATION: 97 % | WEIGHT: 266.6 LBS | BODY MASS INDEX: 43.03 KG/M2 | HEART RATE: 64 BPM | TEMPERATURE: 97.7 F | DIASTOLIC BLOOD PRESSURE: 70 MMHG

## 2025-05-20 DIAGNOSIS — N18.4 TYPE 2 DIABETES MELLITUS WITH STAGE 4 CHRONIC KIDNEY DISEASE AND HYPERTENSION (HCC): ICD-10-CM

## 2025-05-20 DIAGNOSIS — E11.22 TYPE 2 DIABETES MELLITUS WITH STAGE 4 CHRONIC KIDNEY DISEASE AND HYPERTENSION (HCC): ICD-10-CM

## 2025-05-20 DIAGNOSIS — F31.5 BIPOLAR I DISORDER, SEVERE, CURRENT OR MOST RECENT EPISODE DEPRESSED, WITH PSYCHOTIC FEATURES (HCC): Primary | ICD-10-CM

## 2025-05-20 DIAGNOSIS — I12.9 TYPE 2 DIABETES MELLITUS WITH STAGE 4 CHRONIC KIDNEY DISEASE AND HYPERTENSION (HCC): ICD-10-CM

## 2025-05-20 DIAGNOSIS — F41.1 GENERALIZED ANXIETY DISORDER: ICD-10-CM

## 2025-05-20 DIAGNOSIS — M89.9 CHRONIC KIDNEY DISEASE-MINERAL AND BONE DISORDER (CKD-MBD): ICD-10-CM

## 2025-05-20 DIAGNOSIS — E11.65 TYPE 2 DIABETES MELLITUS WITH HYPERGLYCEMIA, UNSPECIFIED WHETHER LONG TERM INSULIN USE (HCC): ICD-10-CM

## 2025-05-20 DIAGNOSIS — Z86.73 HISTORY OF CVA (CEREBROVASCULAR ACCIDENT): Primary | ICD-10-CM

## 2025-05-20 DIAGNOSIS — N18.9 CHRONIC KIDNEY DISEASE-MINERAL AND BONE DISORDER (CKD-MBD): ICD-10-CM

## 2025-05-20 DIAGNOSIS — I63.512 CEREBROVASCULAR ACCIDENT (CVA) DUE TO OCCLUSION OF LEFT MIDDLE CEREBRAL ARTERY (HCC): ICD-10-CM

## 2025-05-20 DIAGNOSIS — E83.9 CHRONIC KIDNEY DISEASE-MINERAL AND BONE DISORDER (CKD-MBD): ICD-10-CM

## 2025-05-20 DIAGNOSIS — I10 PRIMARY HYPERTENSION: ICD-10-CM

## 2025-05-20 DIAGNOSIS — E66.813 CLASS 3 SEVERE OBESITY DUE TO EXCESS CALORIES WITH SERIOUS COMORBIDITY AND BODY MASS INDEX (BMI) OF 40.0 TO 44.9 IN ADULT: ICD-10-CM

## 2025-05-20 DIAGNOSIS — K21.9 GASTROESOPHAGEAL REFLUX DISEASE WITHOUT ESOPHAGITIS: ICD-10-CM

## 2025-05-20 DIAGNOSIS — Z09 HOSPITAL DISCHARGE FOLLOW-UP: ICD-10-CM

## 2025-05-20 PROCEDURE — 97110 THERAPEUTIC EXERCISES: CPT

## 2025-05-20 PROCEDURE — 99214 OFFICE O/P EST MOD 30 MIN: CPT

## 2025-05-20 RX ORDER — ATORVASTATIN CALCIUM 20 MG/1
20 TABLET, FILM COATED ORAL DAILY
Qty: 90 TABLET | Refills: 3 | Status: SHIPPED | OUTPATIENT
Start: 2025-05-20

## 2025-05-20 RX ORDER — ATENOLOL 25 MG/1
25 TABLET ORAL DAILY
Qty: 90 TABLET | Refills: 3 | Status: SHIPPED | OUTPATIENT
Start: 2025-05-20

## 2025-05-20 RX ORDER — OMEPRAZOLE 20 MG/1
20 CAPSULE, DELAYED RELEASE ORAL DAILY
Qty: 90 CAPSULE | Refills: 0 | Status: SHIPPED | OUTPATIENT
Start: 2025-05-20 | End: 2025-08-18

## 2025-05-20 RX ORDER — NIFEDIPINE 90 MG/1
90 TABLET, FILM COATED, EXTENDED RELEASE ORAL DAILY
Qty: 90 TABLET | Refills: 1 | Status: SHIPPED | OUTPATIENT
Start: 2025-05-20

## 2025-05-20 NOTE — PROGRESS NOTES
OCCUPATIONAL THERAPY RE-EVALUATION:     5/20/25  Tommie Taylor  1965  [id number]  Harshad Romo*   Diagnosis ICD-10-CM Associated Orders   1. History of CVA (cerebrovascular accident)  Z86.73           Assessment/Plan    Skilled Analysis:  Tommie Taylor is a 59 y.o. male referred to Occupational Therapy s/p History of CVA (cerebrovascular accident) [Z86.73].  Pt participated in skilled OT re-evaluation and following formalized testing as well as clinical observation, Pt presents with the following areas of deficit:  RUE weakness, tone, decreased ability to complete ADLs, ROM, strength, and education. Pt completing AROM and AAROM this date with remained in movement noted to 100* FF and continued/remained ABD. Pt with continued tone noted in biceps, wrist flexors and digits. Pt with slight pain noted this date with AROM, AAROM, and PROM in shoulder. Pt reports that he does not complete much stretching or exercises at home, however has been able to complete more fxl tasks with use R hand as stabilizer and assist. He reports that most recently was able to  and carry laundry basket with both hands. He has also been provided with blue resistive sponge  to complete squeezes at home. Pt with noted improvements in proximal and distal ROM both extension and flexion, specifically in elbow ROM. Pt also able to complete 9 HPT with assist for holding and pincer grasp to put in, however able to pinch and remove independently. Pt has been using his R hand to complete zipping and dressing with no issues or assist needed. He has also completed ADLs such as dressing and showering while seated using shower chair with no assistance. At this time, Pt would benefit from continued OT, however reports that it is a lot for him at the moment. Pt agreeable to 3 month follow up with education on importance of stretching and fxl task completion at home in the mean time. Follow up scheduled for August 2025. Tommie GOODEN  Claudia is in agreement with POC.      POC expires Unit limit Auth Expiration date PT/OT/ST + Visit Limit?   3/27/25 N/A 12/31/25 BOMN   5/21 N/A 5/27/25 4 Approved                     Visit/Unit Tracking  AUTH Status:  Date 1/30 1/31 2/7 2/14 2/19 2/26 2/28 PN 3/5 3/12 3/19   BOMN Used 1 2 3 4 5 6 7 1 2 3    Remaining  7 6 5 4 3 2 1 7 6   5      Visit/Unit Tracking  AUTH Status:  Date 3/21 3/26 RE 4/2 4/11 4/23 4/25 4/29 5/1  PN 5/6    BOMN Used 4 5 1 2 3 4 5 6 1     Remaining  4 3 7 6 5 4 3 2 7      Visit/Unit Tracking  AUTH Status:  Date 5/15 5/20           4 Approved Used 1 2            Remaining  3 2               Duration in weeks: 8 weeks  Plan of care beginning date: 3/26/25  Plan of care expiration date: 5/21/25  Re-eval: 5/21/25    Precautions: CVA, HTN, high BP, Orthostatic              GOALS    Short Term Goals (4 weeks)  Strength/Endurance  - Pt will demo with G tolerance to supine, and seated exercise x 10 minutes with minimal rest breaks required for increased engagement in life roles and weekly exercise regimen GOAL MET; good motivation throughout   - Pt will demo with G understanding of HEP to improve functional progression towards goals in POC and for improved functional use of RUE GOAL MET  AROM/PROM  - Pt will demo with decreased  RUE  shoulder sublux to trace for increased AROM for improved ADL and IADL engagement GOAL MET   - Pt will be able to perform 60* degrees shoulder flexion of proximal RUE to demo increased strength and ROM of affected UE for improved ADLs/IADLs GOAL MET  - Pt will be able to perform near full elbow flexion of distal RUE to demo increased strength and ROM of affected UE for improved ADLs/IADLs GOAL MET; 3/4  - Pt will demo decreased hypertonicity of Modified Mason Scale (MAS) of RUE to 1 at biceps for improved alternate motor patterns, grasp/release, and termination on command for improved dressing/hygiene NOT MET   - Pt will demo good carryover of clinic and home tone  reduction strategies for improved AROM initiation with functional reach with ADL function NOT MET; not completing stretching at home however using RUE as functionally as able    FMC/GMC  - Pt will demo improvement with opening and closing a zipper on Neuro QOL form to with some difficulty demo improved coordination and precision for FM tasks and ADLs GOAL MET  - Pt will increase RUE to independent stabilizer (dependent stabilizer, independent stabilizer, gross assist, semi-functional, functional, fully functional) assist with for ADL/IADL and tabletop tasks for improved functional performance of life roles and salient tasks GOAL MET  - Pt will demo G comprehension of adaptive equipment (long handled reacher/sponge/shoehorn, toileting aid ) use in clinic to improve independence in ADL management in home environment NOT MET; Not interested and able to get self dressed no issues reported    Modalities  - Pt will tolerate BIONESS/NMES/IASTM for improved motor and sensory performance for overall improved strength, tone reduction, and sensation to inc safety and engagement with ADL/IADL function GOAL MET    - Pt/family will demo G understanding and carryover of use of home NMES unit as prescribed to inc carryover of ROM and strengthening strategies of R UE NOT MET; discontinue due to PT with poor at home carry over despite education     Long Term Goals (8 weeks)  Strength/Endurance  - Pt will increase ability to complete hand  strength with dynamometer testing by 1-2 lbs through motor tasks to improve RUE functional . GOAL MET  - Pt will demo with G carryover of HEP to improve functional progression towards goals in POC and for improved functional use of RUE GOAL MET; with blue resistive sponge     AROM/PROM  - Pt will demo with decreased  RUE  shoulder sublux to no for increased AROM for improved ADL and IADL engagement NOT MET    - Pt will be able to perform 90* degrees shoulder flexion of proximal RUE to demo  "increased strength and ROM of affected UE for improved ADLs/IADLs GOAL MET in seated and supine position  - Pt will be able to perform full elbow flexion of distal RUE to demo increased strength and ROM of affected UE for improved ADLs/IADLs PARTIALLY MET    FMC/GMC  - Pt will demo improvement with opening and closing a zipper on Neuro QOL form to with little difficulty demo improved coordination and precision for FM tasks and ADLs GOAL MET   - Pt will increase RUE to gross assist (dependent stabilizer, independent stabilizer, gross assist, semi-functional, functional, fully functional) assist with for ADL/IADL and tabletop tasks for improved functional performance of life roles and salient tasks GOAL MET; can carry laundry basket      Modalities  - Pt will tolerate BIONESS/NMES/IASTM for improved motor and sensory performance for overall improved strength, tone reduction, and sensation to inc safety and engagement with ADL/IADL function GOAL MET   - Pt/family will demo G understanding and carryover of use of home NMES unit as prescribed to inc carryover of ROM and strengthening strategies of R UE DISCONTINUE; see STG update       Subjective    PATIENT GOAL: \"I just want to get it to move again. \"    Patient-Specific Functional Scale   Task is scored 0 (unable to perform activity) to 10 (ability to perform activity independently)    Activity Date: 3/26 Date:  5/20   ADLs- dressing Able to complete independently    2.        3.        4.            HISTORY OF PRESENT ILLNESS:     Pt is a 59 y.o. male who was referred to Occupational Therapy s/p History of CVA (cerebrovascular accident) [Z86.73]. Pt reports to have had a stroke back in October. Pt lives with his girlfriend and he was home when he started to not feel right and then fell over. Pt reports that his girlfriend helps with ADLs mainly getting shirt and jackets on with his arm. He states that he is not sure how long he was in the hospital for and does not " "recall ever getting therapy for his UE. Per chart review, Pt had been reciving home health therapy for PT, OT, and SLP however per Pt choice, stopping therapy. Pt had been educated on AD in past sessions with Home health as well as increased movement when in therapy. Pt was not working prior to stroke and reports to not do much at home. When asked if Pt completes stretching at home for his RUE, pt denies and states that he \"just lets it go\". Pt reports that his girlfriends father assists in driving Pt to his appts as able.     Per chart review on 10/14/25, \"Tommie Taylor is a 59 y.o. male patient who originally presented to the hospital on 10/14/2024 due to right upper and lower extremity weakness.  On this admission, patient was found to have acute CVA.  MRI revealed left corona radiata stroke.  Neurologist was on board.  TNK was administered in the ICU.  With hypertensive emergency, patient was also noted nicardipine drip.  Patient eventually was placed on dual antiplatelet therapy for total of 21 days, then continue aspirin monotherapy indefinitely as per neurology.  Continue atorvastatin.  Recommended Zio patch in the outpatient to evaluate for any possible arrhythmia, for which PCP may facilitate this.  As per neurologist, patient will follow-up with them in the office in 6 to 8 weeks.  Patient's hospital stay was complicated with him developing acute kidney injury likely secondary to ATN versus TMA in the setting of uncontrolled blood pressure and complicated with contrast associated nephropathy.  Nephrologist was on board.  This past few days, patient's serum creatinine improving slowly.  Nephrologist was okay with the discharge and the patient today.  Patient's BMP should be monitored closely  In the rehab facility and in the outpatient.  Patient's condition improved, but still had significant right sided weakness/paresis, and agreed with acute rehabilitation facility placement and discharge " "today.\"    PMH:   Past Medical History:   Diagnosis Date    ADHD, adult residual type     Anxiety     Anxiety     Bipolar 1 disorder (HCC)     Cataract     Left eye    Chronic kidney disease     Colon polyp     CPAP (continuous positive airway pressure) dependence     Depression     Depression     Diabetes mellitus (HCC)     blood sugar 167 on admission    Equinus deformity of foot     GERD (gastroesophageal reflux disease)     Homicidal ideations     Hyperlipidemia     Hypertension     Microalbuminuria     Morbid obesity (HCC)     Neuropathy     Shortness of breath     Sleep apnea     CPAP at bedtime    Sleep apnea     Stroke ([facility])     Suicidal intent          Pain Levels   Resting:  3- in L hand    With Activity:  4- in L hand.      Objective    Impairment Observations:            CATALINO Lopez           UPPER EXTREMITY FUNCTION   Impaired Intact Dominant Hand: R     /PINCH STRENGTH             Dynamometer    - Gross Grasp 20 lbs 70 lbs low  R- remained  L- remained   Pinch Meter     - Pincer 7 lbs 18 lbs low  R- remained; lateral aspect of D2  L- improved x 3 lbs    - Tripod 6 lbs 17 lbs low  R- remained  L- improved x 2 lbs    - Lateral 9 lbs 18 lbs low  R- remained  L- remained     AROM (Seated)             Scapula   - Retraction/Protraction  - Elevation/Depression  - Upward/Downward rotation      Shoulder *  WFL  Compensation in abduction- remained   Shoulder Ext 30 * WFL   Improved   Shoulder internal rotation       Shoulder external rotation       Shoulder Abd 70*  WFL   Remained    Shoulder Add Near full  WFL      Horizontal Abd        Horizontal Add        Elbow Flex 3/4 WFL   Improved   Elbow Ext Near full  WFL   Remained- tone in elbow   Pronation Full  WFL   Remained   Supination 3/4  WFL   Remained   Wrist Flex 1/4 WFL   Improved   Wrist Ext trace  WFL   Remained   Digit Flex 1/2  WFL   Remained   Digit Ex Full  WFL   Improved   Composite Grasp   WFL      Hook Grasp   WFL      Subluxation  " Anterior 1 FB    Remained- no pain     AROM (Supine)            Shoulder FF 90*  WFL   Remained   Shoulder Ext  WFL      Shoulder ABD 85*   WFL   Remained    Shoulder ADD   WFL      Horizontal ABD        Horizontal ADD        Elbow Flex 1/2  WFL   Remained    Elbow Ext Near full  WFL   Remained   Pronation Resting position  WFL   Remained    Supination Neutral  WFL   Remained    Wrist Flex trace  WFL     Wrist Ext Neutral- resting  WFL     Digit Flex 1/2  WFL  Remained    Digit Ext Full WFL  Improved      PROM (supine position)           Shoulder *  WFL  Tone improved   Shoulder Ext 40*  WFL  Remained   Shoulder ABD 90*  WFL   Remained- slight pain at end range   Shoulder ADD 1/2  WFL      Horizontal ABD      Horizontal ADD        Elbow Flex 3/4  WFL   Tone- pain noted   Elbow Ext 3/4  WFL   Tone   Wrist Flex 1/2  WFL      Wrist Ext 1/2  WFL      Digit Flex   WFL      Digit Ext  WFL     Supination  WFL     Pronation   WFL        MMT             Shoulder FF 3/5 5/5 Remained    Shoulder Ext 4/5 5/5 Improved    Shoulder Abduction 3+/5 5/5 Improved   Shoulder Adduction 3+/5 5/5 Improved   Elbow Flex 3+/5 5/5 Remained    Elbow Ext 3+/5 5/5 Remained   Wrist Flex 3/5 5/5 improved   Wrist Ext 3/5 5/5 Improved    Gross Grasp 2/5 5/5      SENSATION      Monofilament Testing  Normal: 2.83 mm    Diminished light touch: 3.61 mm    Diminished protective sensation: 4.31 mm    Loss of protective sensation: 4.56    Complete loss of sensation / deep pressure: 6.65      Sharp / Dull       Proprioception      Hot/Cold Temp      Stereognosis         COORDINATION      Opposition Able to oppose to D 2-4  WFL Improved   Finger to Nose Able to reach however body compensation WFL At 90* FF, head and neck compensation- remained   Rapid Alternating Movement Slight supination with rapid movement  WFL Compensation noted throughout- remained   9 Hole Peg Test Able to place all pegs with therapist assist to hold for pincer grasp in 1 min 40  sec, pt then removed with no assist and pincer grasp in 27 sec 27 seconds  Remained   Fxnl Dexterity Test unable    DNT     TONE: MODIFIED VANNESSA SCALE      No increase in muscle tone (0)      Slight Increase in muscle tone with catch and release or min resist at end range (1)      Slight Increase in muscle tone with catch and release, followed by min resistance through remainder of range (1+) Triceps, wrist flexors, supinators  Remained    Increased muscle tone through full range, able to be moved easily (2)      Considerable increase in tone, difficult to move (3)      Rigid in Flexion/Extension (4)                ADL Simulation- DNT- WFL able to complete independently with zipper  Donning t-shirt/jacket-   26 seconds with no assist         Neuro QOL  Upper Extremity Function (Fine motor, ADL):     Are you able to turn a key in a lock? With some difficulty  Are you able to brush your teeth? With some difficulty  Are you able to make a phone call using a touch tone or key-pad? With some difficulty  Are you able to  coins from a table top? With some difficulty  Are you are able to write with a pen or pencil With much difficulty  Are you able to open and close a zipper? Without any difficulty  Are you able to wash and dry your body? With a little difficulty  Are you able to shampoo your hair? With much difficulty  Are you able to open previously opened jars? With much difficulty  Are you able to hold a plate full of food? With much difficulty  Are you able to pull on trousers? With some difficulty  Are you able to button your shirt? With much difficulty  Are you able to trim your fingernails? With much difficulty  Are you able to cut your toe nails? - does not complete  Are you able to bend down and  clothing from the floor? With some difficulty  How much DIFFICULTY do you currently have using a spoon to eat a meal? Some difficulty  How much DIFFICULTY do you currently have putting on a pullover  shirt? Some difficulty  How much DIFFICULTY do you currently have taking off a pullover shirt? Some difficulty  How much DIFFICULTY do you currently have removing wrappings from small objects? A lot of difficulty  How much DIFFUCULTY do you currently have opening medications or vitamin containers (e.g. childproof containers, small bottles)? A lot of difficulty        OTHER PLANNED THERAPY INTERVENTIONS:   Supine, seated, and in stance neuro re-ed  Task-Oriented Training  Tricep AG  NMES/FES  Cryotherapy for tone reduction  Hot Packs for pain  TENS for pain  FMC/prehension  GMC  Proximal to distal teaming  Timed Trials  Manual tx  IASTM  Hand to target  Sensory re-ed (Cutaneous/Proprioceptive)  Seated functional reach: crossing midline  Supine place and hold  WBearing strategies   Closed chain activities  Open chain activities  Mirror Therapy  HEP  Orthotic Development

## 2025-05-20 NOTE — PROGRESS NOTES
[facility]  INTERNAL MEDICINE OFFICE VISIT     PATIENT INFORMATION     Tommie Taylor   59 y.o. male   MRN: [id number]    ASSESSMENT/PLAN     Diagnoses and all orders for this visit:    1. Bipolar I disorder, severe, current or most recent episode depressed, with psychotic features (HCC) (Primary)  2. Generalized anxiety disorder  States he feels ups and downs, and worse after stroke    - Ambulatory referral to Psych Services; Future  -Recommended avoiding trying wife's medication as antidepressant can exacerbate manic symptoms.      3. Primary hypertension, renovascular  4. CKD4 with proteinuria  Improved on recheck, was normal at nephro visit  Limited options given extensive renal disease, baseline 3.5-4.0    PLAN:  - atenolol (TENORMIN) 25 mg tablet; Take 1 tablet (25 mg total) by mouth daily  Dispense: 90 tablet; Refill: 3  - NIFEdipine (ADALAT CC) 90 mg 24 hr tablet; Take 1 tablet (90 mg total) by mouth daily  Dispense: 90 tablet; Refill: 1  -Vein mapping for fistula    4. Type 2 diabetes mellitus with hyperglycemia, unspecified whether long term insulin use (HCC)  5. Type 2 diabetes mellitus with stage 4 chronic kidney disease and hypertension (HCC)  Well controlled A1c 6.0 with lifestyle modifications    PLAN:  - atorvastatin (LIPITOR) 20 mg tablet; Take 1 tablet (20 mg total) by mouth daily  Dispense: 90 tablet; Refill: 3  - IRIS Diabetic eye exam  - Diabetic Shoe  -Discussed cutting back on soda       6. Class 3 severe obesity due to excess calories with serious comorbidity and body mass index (BMI) of 40.0 to 44.9 in adult    -Discussed benefits of weight loss    7. Cerebrovascular accident (CVA) due to occlusion of left middle cerebral artery (HCC)    -Continue aspirin statin, discussed importance of BP and A1C control  -Neurology and OT follow up    8. Gastroesophageal reflux disease without esophagitis    - omeprazole (PriLOSEC) 20 mg delayed release capsule; Take  1 capsule (20 mg total) by mouth daily  Dispense: 90 capsule; Refill: 0       Schedule a follow-up appointment in 3 months.    HEALTH MAINTENANCE     Immunization History   Administered Date(s) Administered    COVID-19 PFIZER VACCINE 0.3 ML IM 03/30/2021, 04/24/2021    INFLUENZA 12/11/2014, 10/09/2015, 12/21/2016, 09/28/2017    Influenza Quadrivalent, 6-35 Months IM 12/21/2016    Influenza Recombinant Preservative Free Im 12/05/2024    Influenza, injectable, quadrivalent, preservative free 0.5 mL 11/06/2023    Influenza, recombinant, quadrivalent,injectable, preservative free 12/04/2018, 11/01/2019, 10/23/2020, 01/24/2022, 11/07/2022    Influenza, seasonal, injectable 12/11/2014, 10/09/2015, 09/28/2017    Pneumococcal Polysaccharide PPV23 12/04/2018     CHIEF COMPLAINT     Chief Complaint   Patient presents with    Follow-up      HISTORY OF PRESENT ILLNESS     Patient is a 59 year old with recent CVA LMCA, HTN, CKD4-BMD with proteinuria, prediabetes, DAISY on CPAP, HLD who presents for follow up.  Patient has difficult to control HTN given renal disease, follows with Nephrology and was controlled at that visit on Atenolol 25mg, nifedipine 90 mg. Planned for fistula. In the past hydralazine was stopped for orthostatic hypotension. Patient agreeable for pending colonoscopy. Continues to follow with OT/PT.     REVIEW OF SYSTEMS     Review of Systems   Respiratory:  Negative for shortness of breath.    Cardiovascular:  Negative for chest pain.     OBJECTIVE     Vitals:    05/20/25 1438 05/20/25 1513   BP: 155/84 140/70   BP Location: Right arm Left arm   Patient Position: Sitting    Cuff Size: Large    Pulse: 64    Temp: 97.7 °F (36.5 °C)    TempSrc: Temporal    SpO2: 97%    Weight: 121 kg (266 lb 9.6 oz)      Physical Exam  Vitals reviewed.   Constitutional:       General: He is not in acute distress.    Cardiovascular:      Rate and Rhythm: Normal rate and regular rhythm.      Pulses: Normal pulses.      Heart sounds:  Normal heart sounds.   Pulmonary:      Effort: Pulmonary effort is normal.      Breath sounds: Normal breath sounds.   Abdominal:      Palpations: Abdomen is soft.      Tenderness: There is no abdominal tenderness.     Musculoskeletal:      Right lower leg: Edema present.      Left lower leg: Edema present.     Neurological:      Mental Status: He is alert and oriented to person, place, and time.      Motor: Weakness (RUE) present.       CURRENT MEDICATIONS   Current Medications[1]    PAST MEDICAL & SURGICAL HISTORY     Past Medical History:   Diagnosis Date    ADHD, adult residual type     Anxiety     Anxiety     Bipolar 1 disorder (HCC)     Cataract     Left eye    Chronic kidney disease     Colon polyp     CPAP (continuous positive airway pressure) dependence     Depression     Depression     Diabetes mellitus (HCC)     blood sugar 167 on admission    Equinus deformity of foot     GERD (gastroesophageal reflux disease)     Homicidal ideations     Hyperlipidemia     Hypertension     Microalbuminuria     Morbid obesity (HCC)     Neuropathy     Shortness of breath     Sleep apnea     CPAP at bedtime    Sleep apnea     Stroke (HCC)     Suicidal intent      Past Surgical History:   Procedure Laterality Date    CATARACT EXTRACTION      CATARACT EXTRACTION      COLONOSCOPY      HAND SURGERY Right     OTHER SURGICAL HISTORY      REPAIR OF SUPERFICIAL WOUND FACE    LA ESOPHAGOGASTRODUODENOSCOPY TRANSORAL DIAGNOSTIC N/A 06/14/2018    Procedure: ESOPHAGOGASTRODUODENOSCOPY (EGD);  Surgeon: Yinka Maier MD;  Location: [facility];  Service: Gastroenterology    LA ESOPHAGOGASTRODUODENOSCOPY TRANSORAL DIAGNOSTIC N/A 09/12/2018    Procedure: EGD AND COLONOSCOPY;  Surgeon: Yinka Maier MD;  Location: [facility];  Service: Gastroenterology     SOCIAL & FAMILY HISTORY     Social History     Socioeconomic History    Marital status: /Civil Union     Spouse name: Not on file    Number of children: 1    Years of education: Not on  "file    Highest education level: Not on file   Occupational History    Occupation: On disability   Tobacco Use    Smoking status: Former     Current packs/day: 0.00     Types: Cigarettes     Quit date:      Years since quittin.4    Smokeless tobacco: Current     Types: Chew    Tobacco comments:     pt \"Quit after approx over 25 years ago\"   Vaping Use    Vaping status: Never Used   Substance and Sexual Activity    Alcohol use: Not Currently     Comment: rarely    Drug use: Not Currently     Comment: quit 20 years ago    Sexual activity: Yes     Partners: Female     Birth control/protection: None     Comment: steady girlfriend   Other Topics Concern    Not on file   Social History Narrative     from spouse, technically still  but living with other partner, partner's father, and early 2019, his partner's daughter and boyfriend moved in with their two children.  Then the boyfriend moved out in 2019.   Then partner's daughter moved out approx 2021.  (Pt's partner has guardianship of the grandchildren per Pt).        Children: 1 stepdaughter         Education:    Pt denies any hx of learning disabilities and reached childhood milestones on time as far as he knows.  He wore braces for equinovarus but states he did not suffer delay in walking    Graduated High School --he was held back 3 times because \"I was just lazy, didn't do the work.\"    No college    Took some core classes in [place] and worked as a volunteer  from approx [id number]             Substance Abuse History:     Pt drinks ETOH approx q 3-4 months but denies any h/o ETOH abuse.    Pt tried smoking Crack once in the past and has been smoking THC once q 2-3 months since 12y/o.     He denies other drug use, IVDA, addictions or drug abuse.         Social Drivers of Health     Financial Resource Strain: Low Risk  (2025)    Overall Financial Resource Strain (CARDIA)     Difficulty of Paying Living Expenses: Not " hard at all   Food Insecurity: No Food Insecurity (5/5/2025)    Nursing - Inadequate Food Risk Classification     Worried About Running Out of Food in the Last Year: Never true     Ran Out of Food in the Last Year: Never true     Ran Out of Food in the Last Year: Never true   Transportation Needs: No Transportation Needs (5/5/2025)    PRAPARE - Transportation     Lack of Transportation (Medical): No     Lack of Transportation (Non-Medical): No   Physical Activity: Inactive (9/14/2021)    Exercise Vital Sign     Days of Exercise per Week: 0 days     Minutes of Exercise per Session: 0 min   Stress: No Stress Concern Present (9/14/2021)    [employer] [place] [employer] - Occupational Stress Questionnaire     Feeling of Stress : Not at all   Social Connections: Moderately Isolated (9/14/2021)    Social Connection and Isolation Panel     Frequency of Communication with Friends and Family: More than three times a week     Frequency of Social Gatherings with Friends and Family: Once a week     Attends Mandaeism Services: Never     Active Member of Clubs or Organizations: No     Attends Club or Organization Meetings: Never     Marital Status: Living with partner   Intimate Partner Violence: Unknown (2/11/2025)    Nursing IPS     Feels Physically and Emotionally Safe: Not on file     Physically Hurt by Someone: Not on file     Humiliated or Emotionally Abused by Someone: Not on file     Physically Hurt by Someone: No     Hurt or Threatened by Someone: No   Housing Stability: Low Risk  (5/5/2025)    Housing Stability Vital Sign     Unable to Pay for Housing in the Last Year: No     Number of Times Moved in the Last Year: 1     Homeless in the Last Year: No     Social History     Substance and Sexual Activity   Alcohol Use Not Currently    Comment: rarely       Social History     Substance and Sexual Activity   Drug Use Not Currently    Comment: quit 20 years ago     Tobacco Use History[2]  Family History   Problem  Relation Age of Onset    Diabetes Mother     Alcohol abuse Father     Diabetes Father     Hypertension Father     Lung cancer Father     Stroke Father     Cancer Father         lung    Alcohol abuse Sister     Depression Sister     Lymphoma Sister     Alcohol abuse Family     Alcohol abuse Sister     Alcohol abuse Sister     Cancer Sister     Heart disease Neg Hx     Thyroid disease Neg Hx                ==  Mali Traylor MD  PGY-3  [facility]    [place] [address]  [place] [address]  Office: [phone]  Fax: [phone]                 [1]   Current Outpatient Medications:     atenolol (TENORMIN) 25 mg tablet, Take 1 tablet (25 mg total) by mouth daily, Disp: 90 tablet, Rfl: 3    atorvastatin (LIPITOR) 20 mg tablet, Take 1 tablet (20 mg total) by mouth daily, Disp: 90 tablet, Rfl: 3    NIFEdipine (ADALAT CC) 90 mg 24 hr tablet, Take 1 tablet (90 mg total) by mouth daily, Disp: 90 tablet, Rfl: 1    omeprazole (PriLOSEC) 20 mg delayed release capsule, Take 1 capsule (20 mg total) by mouth daily, Disp: 90 capsule, Rfl: 0    acetaminophen (TYLENOL) 325 mg tablet, Take 2 tablets (650 mg total) by mouth every 6 (six) hours as needed for mild pain, fever or headaches, Disp: , Rfl:     ammonium lactate (LAC-HYDRIN) 12 % cream, Apply topically as needed for dry skin, Disp: 385 g, Rfl: 0    aspirin 81 mg EC tablet, Take 1 tablet (81 mg total) by mouth daily, Disp: 30 tablet, Rfl: 2    calcium carbonate (TUMS) 500 mg chewable tablet, Chew 2 tablets (1,000 mg total) 2 (two) times a day as needed for indigestion or heartburn, Disp: , Rfl:     Cholecalciferol (VITAMIN D3) 1,000 units tablet, Take 2 tablets (2,000 Units total) by mouth daily, Disp: 180 tablet, Rfl: 0    ergocalciferol (ERGOCALCIFEROL) 1.25 MG (71699 UT) capsule, Take 1 capsule (50,000 Units total) by mouth once a week, Disp: 12 capsule, Rfl: 0    glucose blood  "(FREESTYLE LITE) test strip, CHECK BLOOD SUGARS FOUR TIMES DAILY, Disp: 400 strip, Rfl: 3    Lancets (freestyle) lancets, Use as instructed, Disp: 300 each, Rfl: 0    magnesium gluconate (MAGONATE) 500 MG tablet, Take 1 tablet (500 mg total) by mouth every other day, Disp: 90 tablet, Rfl: 1  [2]   Social History  Tobacco Use   Smoking Status Former    Current packs/day: 0.00    Types: Cigarettes    Quit date:     Years since quittin.4   Smokeless Tobacco Current    Types: Chew   Tobacco Comments    pt \"Quit after approx over 25 years ago\"     "

## 2025-05-22 ENCOUNTER — APPOINTMENT (OUTPATIENT)
Dept: OCCUPATIONAL THERAPY | Facility: CLINIC | Age: 60
End: 2025-05-22
Payer: COMMERCIAL

## 2025-05-22 ENCOUNTER — TELEPHONE (OUTPATIENT)
Age: 60
End: 2025-05-22

## 2025-05-22 NOTE — TELEPHONE ENCOUNTER
Contacted patient after receiving [employer] response from Anel Dowd giving the okay to schedule a f/u appointment. Pt is scheduled 6/26/25 at 4:30. Per provider would like an end of the day appointment or before a break. Message being forward to office for scheduling.

## 2025-05-23 ENCOUNTER — RESULTS FOLLOW-UP (OUTPATIENT)
Dept: INTERNAL MEDICINE CLINIC | Facility: CLINIC | Age: 60
End: 2025-05-23

## 2025-05-23 DIAGNOSIS — E11.65 TYPE 2 DIABETES MELLITUS WITH HYPERGLYCEMIA, UNSPECIFIED WHETHER LONG TERM INSULIN USE (HCC): Primary | ICD-10-CM

## 2025-05-23 DIAGNOSIS — E11.22 TYPE 2 DIABETES MELLITUS WITH STAGE 4 CHRONIC KIDNEY DISEASE AND HYPERTENSION (HCC): ICD-10-CM

## 2025-05-23 DIAGNOSIS — I63.512 CEREBROVASCULAR ACCIDENT (CVA) DUE TO OCCLUSION OF LEFT MIDDLE CEREBRAL ARTERY (HCC): ICD-10-CM

## 2025-05-23 DIAGNOSIS — N18.4 TYPE 2 DIABETES MELLITUS WITH STAGE 4 CHRONIC KIDNEY DISEASE AND HYPERTENSION (HCC): ICD-10-CM

## 2025-05-23 DIAGNOSIS — I12.9 TYPE 2 DIABETES MELLITUS WITH STAGE 4 CHRONIC KIDNEY DISEASE AND HYPERTENSION (HCC): ICD-10-CM

## 2025-06-10 DIAGNOSIS — E55.9 VITAMIN D DEFICIENCY: ICD-10-CM

## 2025-06-11 ENCOUNTER — ANESTHESIA EVENT (OUTPATIENT)
Dept: PERIOP | Facility: HOSPITAL | Age: 60
End: 2025-06-11
Payer: COMMERCIAL

## 2025-06-11 NOTE — ANESTHESIA PREPROCEDURE EVALUATION
Procedure:  left arm AVF creation, possible AV graft (Left: Arm Upper)    Relevant Problems   CARDIO   (+) Hyperlipidemia   (+) Hypertension      ENDO   (+) Type 2 diabetes mellitus (HCC)   (+) Type 2 diabetes mellitus with stage 4 chronic kidney disease (HCC)   (+) Type 2 diabetes mellitus with stage 4 chronic kidney disease and hypertension (HCC)      GI/HEPATIC   (+) GERD (gastroesophageal reflux disease)   (+) Hiatal hernia      /RENAL   (+) Benign hypertension with CKD (chronic kidney disease) stage IV (HCC)   (+) CKD (chronic kidney disease) stage 4, GFR 15-29 ml/min (HCC)   (+) Chronic kidney disease-mineral and bone disorder (CKD-MBD)      HEMATOLOGY   (+) Anemia due to stage 4 chronic kidney disease  (HCC)      NEURO/PSYCH   (+) Cerebrovascular accident (CVA) due to occlusion of left middle cerebral artery (HCC)   (+) Diabetic polyneuropathy (HCC)   (+) Generalized anxiety disorder      PULMONARY   (+) DAISY (obstructive sleep apnea)     Not currently using CPAP  Stage 4 CKD   Not currently on GLP-1 agonist       Lab Results   Component Value Date    WBC 6.82 05/07/2025    HGB 11.2 (L) 05/07/2025    HCT 34.6 (L) 05/07/2025    MCV 79 (L) 05/07/2025     05/07/2025     Lab Results   Component Value Date    SODIUM 137 05/07/2025    K 4.9 05/07/2025     05/07/2025    CO2 26 05/07/2025    BUN 27 (H) 05/07/2025    CREATININE 3.42 (H) 05/07/2025    GLUC 84 02/11/2025    CALCIUM 8.6 05/07/2025     Lab Results   Component Value Date    INR 1.31 (H) 02/10/2025    INR 1.13 10/17/2024    INR 1.00 10/14/2024    PROTIME 17.1 (H) 02/10/2025    PROTIME 15.2 (H) 10/17/2024    PROTIME 13.9 10/14/2024     Lab Results   Component Value Date    HGBA1C 6.0 (H) 05/07/2025             Physical Exam    Airway     Mallampati score: II  TM Distance: >3 FB  Neck ROM: full      Cardiovascular      Dental   No notable dental hx     Pulmonary      Neurological      Other Findings        Anesthesia Plan  ASA Score- 3      Anesthesia Type- general with ASA Monitors.         Additional Monitors:     Airway Plan:            Plan Factors-    Chart reviewed.   Existing labs reviewed. Patient summary reviewed.    Patient is not a current smoker.      Obstructive sleep apnea risk education given perioperatively.        Induction- intravenous.    Postoperative Plan-     Perioperative Resuscitation Plan - Level 1 - Full Code.       Informed Consent- Anesthetic plan and risks discussed with patient.  I personally reviewed this patient with the CRNA. Discussed and agreed on the Anesthesia Plan with the CRNA..

## 2025-06-12 ENCOUNTER — TELEPHONE (OUTPATIENT)
Age: 60
End: 2025-06-12

## 2025-06-12 ENCOUNTER — HOSPITAL ENCOUNTER (OUTPATIENT)
Facility: HOSPITAL | Age: 60
Setting detail: OUTPATIENT SURGERY
Discharge: HOME | End: 2025-06-12
Attending: SURGERY | Admitting: SURGERY
Payer: COMMERCIAL

## 2025-06-12 ENCOUNTER — ANESTHESIA (OUTPATIENT)
Dept: PERIOP | Facility: HOSPITAL | Age: 60
End: 2025-06-12
Payer: COMMERCIAL

## 2025-06-12 VITALS
SYSTOLIC BLOOD PRESSURE: 160 MMHG | RESPIRATION RATE: 18 BRPM | DIASTOLIC BLOOD PRESSURE: 93 MMHG | WEIGHT: 266 LBS | HEIGHT: 66 IN | HEART RATE: 60 BPM | OXYGEN SATURATION: 100 % | TEMPERATURE: 97.4 F | BODY MASS INDEX: 42.75 KG/M2

## 2025-06-12 DIAGNOSIS — N18.4 CKD (CHRONIC KIDNEY DISEASE) STAGE 4, GFR 15-29 ML/MIN (HCC): Primary | ICD-10-CM

## 2025-06-12 LAB
GLUCOSE SERPL-MCNC: 108 MG/DL (ref 65–140)
GLUCOSE SERPL-MCNC: 82 MG/DL (ref 65–140)

## 2025-06-12 PROCEDURE — 36830 ARTERY-VEIN NONAUTOGRAFT: CPT | Performed by: SURGERY

## 2025-06-12 PROCEDURE — NC001 PR NO CHARGE: Performed by: SURGERY

## 2025-06-12 PROCEDURE — C1768 GRAFT, VASCULAR: HCPCS | Performed by: SURGERY

## 2025-06-12 PROCEDURE — 82948 REAGENT STRIP/BLOOD GLUCOSE: CPT

## 2025-06-12 DEVICE — PROPATEN VASCULAR GRAFT SW 4-7MMX45CM TAPERED HEPARIN
Type: IMPLANTABLE DEVICE | Site: ARM | Status: FUNCTIONAL
Brand: GORE PROPATEN VASCULAR GRAFT

## 2025-06-12 RX ORDER — ONDANSETRON 2 MG/ML
4 INJECTION INTRAMUSCULAR; INTRAVENOUS ONCE AS NEEDED
Status: DISCONTINUED | OUTPATIENT
Start: 2025-06-12 | End: 2025-06-12 | Stop reason: HOSPADM

## 2025-06-12 RX ORDER — FENTANYL CITRATE 50 UG/ML
INJECTION, SOLUTION INTRAMUSCULAR; INTRAVENOUS AS NEEDED
Status: DISCONTINUED | OUTPATIENT
Start: 2025-06-12 | End: 2025-06-12

## 2025-06-12 RX ORDER — BUPIVACAINE HYDROCHLORIDE 5 MG/ML
INJECTION, SOLUTION EPIDURAL; INTRACAUDAL; PERINEURAL AS NEEDED
Status: DISCONTINUED | OUTPATIENT
Start: 2025-06-12 | End: 2025-06-12 | Stop reason: HOSPADM

## 2025-06-12 RX ORDER — CHLORHEXIDINE GLUCONATE ORAL RINSE 1.2 MG/ML
15 SOLUTION DENTAL ONCE
Status: COMPLETED | OUTPATIENT
Start: 2025-06-12 | End: 2025-06-12

## 2025-06-12 RX ORDER — CEFAZOLIN SODIUM 2 G/50ML
2000 SOLUTION INTRAVENOUS ONCE
Status: COMPLETED | OUTPATIENT
Start: 2025-06-12 | End: 2025-06-12

## 2025-06-12 RX ORDER — ONDANSETRON 2 MG/ML
INJECTION INTRAMUSCULAR; INTRAVENOUS AS NEEDED
Status: DISCONTINUED | OUTPATIENT
Start: 2025-06-12 | End: 2025-06-12

## 2025-06-12 RX ORDER — LIDOCAINE HYDROCHLORIDE 10 MG/ML
INJECTION, SOLUTION EPIDURAL; INFILTRATION; INTRACAUDAL; PERINEURAL AS NEEDED
Status: DISCONTINUED | OUTPATIENT
Start: 2025-06-12 | End: 2025-06-12 | Stop reason: HOSPADM

## 2025-06-12 RX ORDER — OXYCODONE AND ACETAMINOPHEN 5; 325 MG/1; MG/1
1 TABLET ORAL EVERY 6 HOURS PRN
Qty: 20 TABLET | Refills: 0 | Status: SHIPPED | OUTPATIENT
Start: 2025-06-12 | End: 2025-06-22

## 2025-06-12 RX ORDER — LIDOCAINE HYDROCHLORIDE 10 MG/ML
INJECTION, SOLUTION EPIDURAL; INFILTRATION; INTRACAUDAL; PERINEURAL AS NEEDED
Status: DISCONTINUED | OUTPATIENT
Start: 2025-06-12 | End: 2025-06-12

## 2025-06-12 RX ORDER — MIDAZOLAM HYDROCHLORIDE 2 MG/2ML
INJECTION, SOLUTION INTRAMUSCULAR; INTRAVENOUS AS NEEDED
Status: DISCONTINUED | OUTPATIENT
Start: 2025-06-12 | End: 2025-06-12

## 2025-06-12 RX ORDER — SODIUM CHLORIDE, SODIUM LACTATE, POTASSIUM CHLORIDE, CALCIUM CHLORIDE 600; 310; 30; 20 MG/100ML; MG/100ML; MG/100ML; MG/100ML
INJECTION, SOLUTION INTRAVENOUS CONTINUOUS PRN
Status: DISCONTINUED | OUTPATIENT
Start: 2025-06-12 | End: 2025-06-12

## 2025-06-12 RX ORDER — PROPOFOL 10 MG/ML
INJECTION, EMULSION INTRAVENOUS AS NEEDED
Status: DISCONTINUED | OUTPATIENT
Start: 2025-06-12 | End: 2025-06-12

## 2025-06-12 RX ORDER — FENTANYL CITRATE/PF 50 MCG/ML
50 SYRINGE (ML) INJECTION
Status: DISCONTINUED | OUTPATIENT
Start: 2025-06-12 | End: 2025-06-12 | Stop reason: HOSPADM

## 2025-06-12 RX ADMIN — PHENYLEPHRINE HYDROCHLORIDE 30 MCG/MIN: 50 INJECTION INTRAVENOUS at 07:37

## 2025-06-12 RX ADMIN — ONDANSETRON 4 MG: 2 INJECTION, SOLUTION INTRAMUSCULAR; INTRAVENOUS at 07:34

## 2025-06-12 RX ADMIN — FENTANYL CITRATE 25 MCG: 50 INJECTION INTRAMUSCULAR; INTRAVENOUS at 08:54

## 2025-06-12 RX ADMIN — FENTANYL CITRATE 25 MCG: 50 INJECTION INTRAMUSCULAR; INTRAVENOUS at 09:33

## 2025-06-12 RX ADMIN — PROPOFOL 100 MG: 10 INJECTION, EMULSION INTRAVENOUS at 07:34

## 2025-06-12 RX ADMIN — FENTANYL CITRATE 75 MCG: 50 INJECTION INTRAMUSCULAR; INTRAVENOUS at 08:22

## 2025-06-12 RX ADMIN — CHLORHEXIDINE GLUCONATE 15 ML: 1.2 SOLUTION ORAL at 07:01

## 2025-06-12 RX ADMIN — MIDAZOLAM 1 MG: 1 INJECTION INTRAMUSCULAR; INTRAVENOUS at 07:29

## 2025-06-12 RX ADMIN — DEXMEDETOMIDINE 4 MCG: 100 INJECTION, SOLUTION INTRAVENOUS at 07:38

## 2025-06-12 RX ADMIN — CEFAZOLIN SODIUM 2000 MG: 2 SOLUTION INTRAVENOUS at 07:30

## 2025-06-12 RX ADMIN — FENTANYL CITRATE 25 MCG: 50 INJECTION INTRAMUSCULAR; INTRAVENOUS at 07:38

## 2025-06-12 RX ADMIN — MIDAZOLAM 1 MG: 1 INJECTION INTRAMUSCULAR; INTRAVENOUS at 07:34

## 2025-06-12 RX ADMIN — LIDOCAINE HYDROCHLORIDE 30 MG: 10 INJECTION, SOLUTION EPIDURAL; INFILTRATION; INTRACAUDAL; PERINEURAL at 07:34

## 2025-06-12 RX ADMIN — DEXMEDETOMIDINE 4 MCG: 100 INJECTION, SOLUTION INTRAVENOUS at 07:55

## 2025-06-12 RX ADMIN — SODIUM CHLORIDE, SODIUM LACTATE, POTASSIUM CHLORIDE, AND CALCIUM CHLORIDE: .6; .31; .03; .02 INJECTION, SOLUTION INTRAVENOUS at 07:05

## 2025-06-12 NOTE — TELEPHONE ENCOUNTER
Elen[facility] called today wanting to know which procedure the patient had today, stated that a prescription had been received and just wanted to confirm.     [email or web address]  [facility]  [address], [place] [address] [phone]

## 2025-06-12 NOTE — ANESTHESIA POSTPROCEDURE EVALUATION
Post-Op Assessment Note    CV Status:  Stable  Pain Score: 0    Pain management: adequate    Multimodal analgesia used between 6 hours prior to anesthesia start to PACU discharge    Mental Status:  Alert and sleepy   Hydration Status:  Stable   PONV Controlled:  None   Airway Patency:  Patent     Post Op Vitals Reviewed: Yes    No anethesia notable event occurred.    Staff: CRNA           Last Filed PACU Vitals:  Vitals Value Taken Time   Temp 97    Pulse 54    /75    Resp 12    SpO2 100

## 2025-06-12 NOTE — OP NOTE
OPERATIVE REPORT  PATIENT NAME: Tommie Taylor    :  1965  MRN: [id number]  Pt Location: AN OR ROOM 03    SURGERY DATE: 2025    Surgeons and Role:     * Kaveh Saaevdra,  - Primary       Danny Garcia Assistant    Preop Diagnosis:  CKD (chronic kidney disease) stage 4, GFR 15-29 ml/min (HCC) [N18.4]    Post-Op Diagnosis Codes:     * CKD (chronic kidney disease) stage 4, GFR 15-29 ml/min (HCC) [N18.4]    Procedure(s):  Left upper arm straight AV graft using 4 to 7 mm standard wall PTFE graft    Specimen(s):  * No specimens in log *    Estimated Blood Loss:   Minimal    Anesthesia Type:   General/LMA  Plus local anesthetic    Operative Indications:  CKD (chronic kidney disease) stage 4, GFR 15-29 ml/min (HCC) [N18.4]      Operative Findings:  Inadequate lower and upper arm vein for AV fistula.  Patient had good flow in the graft at the end of the procedure with a palpable left radial pulse       Complications:   None    Procedure and Technique:  Patient was correctly identified in the holding area brought to the operating room.  He was placed in the supine position after general anesthesia was induced left arm was prepped and draped in the normal sterile fashion timeout was performed patient received appropriate periprocedural antibiotics.  Intraoperative vein mapping had been done prior to prep and there was no adequate venous conduit for a fistula.  I began by making a longitudinal incision over the brachial artery proximal to the elbow.  Dissection was carried to the subcutaneous tissue down to the crural fascia which was open the brachial artery was identified and circumferentially dissected out and controlled it was noted to be greater than 3 mm with a strong pulse and was soft.  Attention was then turned towards the axillary exposure incision was made between the biceps and triceps crease in the proximal arm using a 15 blade dissection was carried through the subcutaneous tissue down to the  crural sheath which was then opened the axillary vein was identified and circumferentially dissected out and controlled it was noted to be approximately 7 mm in diameter.  At this point 4 mm to 7 mm standard walled tapered graft was brought onto the field.  It was tunneled in a subcutaneous plane and a gentle C curve.  Attention was first turned towards the arterial anastomosis the brachial artery was clamped proximally and distally with vascular clamps a longitudinal arteriotomy was made with an 11 blade and extended with Leon scissors a standard end-to-side anastomosis was then fashioned using a running 5 oh at bedtime 7.  The graft was then clamped and the clamps were released from the vessel.  Attention was then turned towards the venous anastomosis the axillary vein was controlled with vascular clamps the graft was trimmed to length generously spatulated a generous venotomy was made with an 11 blade and Leon scissors greater than a centimeter and then a standard end-to-side anastomosis was fashioned with a 5-0 Prolene.  Prior to completing the suture line the graft was flushed and de-aired and the vein was retrograde flushed.  The graft was noted to have thrill present there was flow present in the axillary vein with augmentation of the graft.  There was a palpable distal left radial pulse.  At this point Floseal was instilled in both wounds both incisions were instilled with local anesthetic for field block and closed in 2 layers the skin was closed with 4-0 Monocryl in subcuticular fashion and Dermabond was applied for dressing.         I was present for the entire procedure.    Patient Disposition:  PACU       Vascular Quality Initiative - Hemodialysis Access Placement    Pre-admission Information   Functional status: Fully active; able to carry on all predisease activities without restriction.     ESRD: ESRD: Pre Dialysis    Historical Information      Previous Access: none    Access Type/Location:          Procedure Information      Status: Outpatient     Side:left      Anesthesia: General     Access Type: Access Type: AV Graft  Reason not autogenous: Suitable vein not available Conduit Type: Prosthetic Graft Inflow Artery is:  Brachial, Upper Arm  Intraoperative Artery taget diameter is: 3mm.  Outflow Vein is: Axillary.  Intraoperative Vein target diameter is: 7mm.  Anastomotic Artery Length 4, Anastomotic Vein Length 12.  Anastomotic Material is: Monofilament non-absorbable suture.  Anastomosis type is: Continuous  Cocomitant Procedure performed-: None    Completion Fistulogram: no     Preop ARTERIAL evaluation and/or treatment: duplex    Preop VENOUS evaluation and/or treatment: ultrasound mapping    *Obtain Target Diameters from study          Post op Information     Discharge Status: Home    Post op Complications: None         SIGNATURE: Kaveh Saavedra DO  DATE: June 12, 2025  TIME: 9:55 AM

## 2025-06-12 NOTE — ANESTHESIA POSTPROCEDURE EVALUATION
Post-Op Assessment Note    CV Status:  Stable    Pain management: adequate       Mental Status:  Alert and awake   Hydration Status:  Euvolemic   PONV Controlled:  Controlled   Airway Patency:  Patent     Post Op Vitals Reviewed: Yes    No anethesia notable event occurred.    Staff: Anesthesiologist           Last Filed PACU Vitals:  Vitals Value Taken Time   Temp 97 °F (36.1 °C) 06/12/25 10:54   Pulse 59 06/12/25 10:54   /74 06/12/25 10:54   Resp 20 06/12/25 10:54   SpO2 99 % 06/12/25 10:54       Modified Dario:     Vitals Value Taken Time   Activity 2 06/12/25 10:54   Respiration 2 06/12/25 10:54   Circulation 2 06/12/25 10:54   Consciousness 2 06/12/25 10:54   Oxygen Saturation 2 06/12/25 10:54     Modified Dario Score: 10

## 2025-06-12 NOTE — DISCHARGE INSTR - AVS FIRST PAGE
DISCHARGE INSTRUCTIONS  DIALYSIS FISTULA SURGERY    ACTIVITY:  Limit use of the operated arm to what is necessary for the first day after surgery. On the second day after surgery, you may start to increase use of your arm as tolerated.  Avoid heavy lifting (no more than 15 lbs) for the first one week.  You should start to exercise your hand on the side of the fistula by squeezing a stress ball or a rolled-up sock. This increases blood flow in your fistula and arm so your fistula will function better.    Feel for a thrill every day. The thrill is the vibration or pulse you feel over the fistula that means the blood is flowing through it. If you cannot feel a thrill, call our office ([phone]).    DIET:   Resume your normal diet.  Good nutrition is important for healing of your incision.    DRESSING:   You may have surgical glue at your surgical site.  There are stitches present under the skin which will absorb on their own.  The glue is used to cover the incision, assist in closure, and prevent contamination. This adhesive will darken and peel away on its own within one to two weeks. Do not pick at it.  If you have a dressing over your surgical site, remove this on the second day after surgery.      INCISION:   If you do not have a dialysis catheter in place, you may shower and get your incision wet.  Wash incision daily with soap and water, but do not rub or scrub the incision; rinse thoroughly and pat dry.  You may have stitches or staples to close your incision and it is okay for these to get wet.  Do not bathe in a tub or swim for the first 4 week following surgery or if you have any open wounds.  It is normal to have mild swelling or discoloration around the incision.   If increasing redness or pain develops, call our office immediately.  Numbness in the region of the incision may occur following the surgery.  This normally improves over six to twelve months.  If you have numbness or pain in your hand,  please call our office immediately.  DO NOT put any powders, creams, ointments, or lotions on your incision.     ARM SWELLING:    Most patients have some noticeable arm swelling after surgery.  This usually disappears within a few weeks.  If swelling is present, elevate the arm whenever possible.      RESTRICTIONS:   Do NOT have blood draws, IV's, or blood pressures performed on the operated arm.    FISTULA USE:    Your fistula will not be used until it has fully matured - approximately 6 to 12 weeks. If you are using a catheter for dialysis, this will not be removed until after your fistula has matured and is being used for dialysis without any issues.    FOLLOW UP STUDIES:  A Doppler ultrasound will be performed about 5-6 weeks after surgery.  Your surgeon will arrange this at your first postoperative visit.     FOLLOW UP APPOINTMENTS:  Making and keeping follow up appointments and ultrasound tests are important to your recovery.  If you have difficulty making it to or keeping your follow up appointments, call the office.    If you have increased pain, fever >101.5, increased drainage, redness or a bad smell at your surgery site, new coldness/numbness of your arm or leg, please call us immediately and GO directly to the ER.    PLEASE CALL THE OFFICE IF YOU HAVE ANY QUESTIONS  [phone]  [phone] [address], [address], [place] [address]  [address] [place] [address]  [address] [place] [address]  [address] [facility], [place] [address]  [address] [facility], [address], [place] [address]  [address] [facility], [address], [place] [address]  [address] [facility], Entr[address], 2nd Floor, [place] [address]  [address] [facility], 1st Floor, [address], [place] [address]  [address] [place] [address]  [address] [facility], [address], [place] [address]

## 2025-06-12 NOTE — H&P
"H&P - Vascular Surgery   Name: Tommie Taylor 59 y.o. male I MRN: [id number]  Unit/Bed#: OR [place] I Date of Admission: 6/12/2025   Date of Service: 6/12/2025 I Hospital Day: 0     Assessment & Plan    CKD  HD access creation today    History of Present Illness   Tommie Taylor is a 59 y.o. male .    Review of Systems  Medical History Review: I have reviewed the patient's PMH, PSH, Social History, Family History, Meds, and Allergies     Objective :  Temp:  [97.2 °F (36.2 °C)] 97.2 °F (36.2 °C)  HR:  [58] 58  BP: (192)/(97) 192/97  Resp:  [20] 20  SpO2:  [100 %] 100 %  O2 Device: None (Room air)    I/O       None            Physical Exam     RRR  Breathing irregular  Ab soft, nt  LE warm      Lab Results: I have reviewed the following results:  No results for input(s): \"WBC\", \"HGB\", \"HCT\", \"PLT\", \"BANDSPCT\", \"SODIUM\", \"K\", \"CL\", \"CO2\", \"BUN\", \"CREATININE\", \"GLUC\", \"CAIONIZED\", \"MG\", \"PHOS\", \"AST\", \"ALT\", \"ALB\", \"TBILI\", \"DBILI\", \"ALKPHOS\", \"PTT\", \"INR\", \"HSTNI0\", \"HSTNI2\", \"BNP\", \"LACTICACID\" in the last 72 hours.    Imaging Results Review: No pertinent imaging studies reviewed.  Other Study Results Review: No additional pertinent studies reviewed.    VTE Prophylaxis: Sequential compression device (Venodyne)   "

## 2025-06-13 ENCOUNTER — TELEPHONE (OUTPATIENT)
Dept: GASTROENTEROLOGY | Facility: AMBULARY SURGERY CENTER | Age: 60
End: 2025-06-13

## 2025-06-13 NOTE — TELEPHONE ENCOUNTER
Called an left message for patient to return call to reschedule his upcoming appointment with Dr. Farfan as patient is a Dr. Steen patient. Call back number left in message. [employer] message also sent regarding reschedule.

## 2025-06-16 ENCOUNTER — TELEPHONE (OUTPATIENT)
Dept: VASCULAR SURGERY | Facility: CLINIC | Age: 60
End: 2025-06-16

## 2025-06-16 ENCOUNTER — NURSE TRIAGE (OUTPATIENT)
Age: 60
End: 2025-06-16

## 2025-06-16 RX ORDER — ERGOCALCIFEROL 1.25 MG/1
50000 CAPSULE, LIQUID FILLED ORAL WEEKLY
Qty: 12 CAPSULE | Refills: 0 | OUTPATIENT
Start: 2025-06-16

## 2025-06-16 NOTE — TELEPHONE ENCOUNTER
Receuved call from Marie, patient's significant other, and she stated that she is concerned that she cannot feel the vibration.  She stated that his arm is swollen.  She stated that the liquid that was coming from his incision was clear.  Reviewed incision care with her - wash daily with soap and water and to change the bandage daily or is becomes moist.  Informed her to keep the incisions clean and dry.  Reviewed when to contact office - increase in drainage, change in color of drainage, open areas, redness, fever/chills, etc.  Informed her that Torrie Bryan PA-C was recommending to move up his post op appointment.  She was agreeable to same.  Call was transferred to [place] to reschedule patient's post op appointment.

## 2025-06-16 NOTE — TELEPHONE ENCOUNTER
"Contacted patient  to inquire about the questions from Torrie Bryan PA-C.  He stated that he noticed a wet substance on Saturday and placed a band aid on the incision.  He stated that it is the incision by the bend of the elbow.  He denies any open areas or signs of infection.  He denies any fevers or chills.  He stated that he did cleanse it with soap and water.  Advised him to change bandage daily and cleanse with soap and water daily.  He stated that he has intermittent numbness and tingling in his hand/fingers and he has had this since his stroke.  He stated that his upper arm is swollen where they did the procedure.  He stated that he cannot feel the thrill, but his right arm is \"dead\" from his stroke and he has vision issues.  He stated that he has pain and has been taking Percocet about every 10 hours for the pain and it helps \"some\".  Informed him that I left a message for Marie, his significant other, and if she has any further information, she can contact the office.  Informed him when to notify office.  Verbal understanding received.  Advised him that Torrie Bryan PA-C is recommending that his post op appointment be moved up and he stated that Marie schedules his appointments as she likes to be there for his appointments and she needs to schedule around her dialysis.  Advised him to have her contact office to schedule appointment.  He stated that she is usually home from dialysis around 4 pm.  "

## 2025-06-16 NOTE — TELEPHONE ENCOUNTER
"Vascular Nurse Navigator Post Op Call    Procedure: Left upper arm straight AV graft using 4 to 7 mm standard wall PTFE graft     Date of Procedure: 6/12/25    Surgeon:   * Kaveh Saavedra, DO - Primary       Danny Garcia Assistant      Painful tingling or numbness in your fingers?: No    Paleness/Coolness in hands/fingers?: No    Redness, swelling or pus from your wound?: See below    Bleeding?: No    Thrill present?: see below    Anticoagulation pt was discharged on post op?: Aspirin    Statin pt was discharged on post op?:  Lipitor (atorvastatin)    Fever/chills?: No    Uncontrolled Pain?: No      Reviewed discharge instructions and incision care with patient.      Dialysis Days and Location:  Not on dialysis    NEXT SCHEDULED OFFICE VISIT:  7/2/25 at 2:30 pm with Dr. Saavedra at [facility]    Transportation Confirmed?: Yes      Any Questions or Concerns?    Patient stated that he is doing okay since procedure.  He stated that he noticed a wet substance on Saturday and placed a band aid on the incision.  He stated that it is the incision by the bend of the elbow.  He denies any open areas or signs of infection.  He stated that he did cleanse it with soap and water.  Advised him to change bandage daily and cleanse with soap and water daily.  He stated that he has intermittent numbness and tingling in his hand/fingers and he has had this since his stroke.  He stated that his upper arm is swollen where they did the procedure.  He stated that he cannot feel the thrill, but his right arm is \"dead\" from his stroke and he has vision issues.  He stated that he has pain and has been taking Percocet about every 10 hours for the pain and it helps \"some\".  Informed him that I left a message for Marie, his significant other, and if she has any further information, she can contact the office.  Informed him when to notify office.  Verbal understanding received.  All questions answered.    "

## 2025-06-16 NOTE — TELEPHONE ENCOUNTER
6/12- S/p left arm  AV graft (Left: Arm Upper)   Overdid it Sat  Leaking sat night put a bandaid   Does not feel the thrill  Positive for pain taking oxycodone  Pt not available for triage    Drainage     Pt numver [phone]     Marie is available tomorrow

## 2025-06-16 NOTE — TELEPHONE ENCOUNTER
"REASON FOR CONVERSATION: Post-op Problem    SYMPTOMS: S/p left arm  AV graft (Left: Arm Upper) Pt sig other Marie called stating pt incision began draining slightly on Sat, unable to describe drainage. Pt placed a band aid over the area, and area is still dry.   Marie concerned she does not feel the thrill, and she is a dialysis patient so she is familiar with assessing the area.   Pt taking oxycodone for pain    Marie was at dialysis so I called Tommie# [phone]   He also stated he cannot feel the thrill. The Band-Aid from Saturday is still intact.      OTHER HEALTH INFORMATION: n/a    PROTOCOL DISPOSITION: Discuss with Provider and Call Back Patient (overriding Call Transferred to PCP Now)    CARE ADVICE PROVIDED: will discuss with provider    PRACTICE FOLLOW-UP: call back with recommendations    Reason for Disposition   Caller has URGENT question and triager unable to answer question    Answer Assessment - Initial Assessment Questions  1. SYMPTOM: \"What's the main symptom you're concerned about?\" (e.g., drainage, incision opened up, pain, redness)      Cannot feel thrill  2. ONSET: \"When did symptoms start?\"      saturday  3. SURGERY: \"What surgery did you have?\"      LA AV graft  4. DATE of SURGERY: \"When was the surgery?\"       6/12  5. INCISION SITE: \"Where is the incision located?\"       [place]  6. REDNESS: \"Is there any redness at the incision site?\" If Yes, ask: \"How wide across is the redness?\" (Inches, centimeters)       denies  7. PAIN: \"Is there any pain?\" If Yes, ask: \"How bad is it?\"  (Scale 1-10; or mild, moderate, severe)      yes  8. BLEEDING: \"Is there any bleeding?\" If Yes, ask: \"How much?\" and \"Where?\"      denies  9. DRAINAGE: \"Is there any drainage from the incision site?\" If Yes, ask: \"What color and how much?\" (e.g., red, cloudy, pus; drops, teaspoon)      Yes and unsure of color  10. FEVER: \"Do you have a fever?\" If Yes, ask: \"What is your temperature, how was it measured, and when did it " "start?\"        denies  11. OTHER SYMPTOMS: \"Do you have any other symptoms?\" (e.g., dizziness, rash elsewhere on body, shaking chills, weakness)        denies    Protocols used: Post-Op Incision Symptoms and Questions-Adult-[place]    "

## 2025-06-16 NOTE — TELEPHONE ENCOUNTER
Is he having any fevers, chills, arm swelling?     Clerical- can we try moving up his post op appointment with Dr. Saavedra to assess AVG?

## 2025-06-17 NOTE — PROGRESS NOTES
"Name: Tommie Taylor      : 1965      MRN: [id number]  Encounter Provider: Kaveh Saavedra DO  Encounter Date: 2025   Encounter department: [facility] [place] [facility]  :  Assessment & Plan  CKD (chronic kidney disease) stage 4, GFR 15-29 ml/min ([facility])  Lab Results   Component Value Date    EGFR 18 2025    EGFR 18 2025    EGFR 21 2025    CREATININE 3.42 (H) 2025    CREATININE 3.50 (H) 2025    CREATININE 3.04 (H) 2025       1 week status post left upper arm AV graft.  Patient is not currently on dialysis.  On exam he has intact incisions with edema in his upper arm.  He denies any left hand pain or numbness he has normal  strength of his left hand and a palpable distal left radial pulse.  He does not have an obvious of palpable thrill in his graft although it is patent by Doppler.  I will send a letter to his nephrologist that this can be used in 3 weeks if he needs to initiate dialysis at that time.  Otherwise he can follow-up with me on an as needed basis I did instruct him to avoid heavy lifting and strenuous exercise with his arm for another month.             History of Present Illness     Patient is s/p LUE AV graft on 25 and presents today for post-op visit.     HPI  Tommie Taylor is a 59 y.o. male   History obtained from: patient    Review of Systems   Constitutional: Negative.    HENT: Negative.     Eyes: Negative.    Respiratory: Negative.     Cardiovascular:  Positive for leg swelling.   Gastrointestinal: Negative.    Endocrine: Negative.    Genitourinary: Negative.    Musculoskeletal: Negative.    Skin: Negative.    Allergic/Immunologic: Negative.    Neurological: Negative.    Hematological: Negative.    Psychiatric/Behavioral: Negative.       I have reviewed the ROS above and made changes as needed.         Objective   /86 (BP Location: Right arm, Patient Position: Sitting)   Pulse 66   Ht 5' 6\" (1.676 m)   Wt 117 kg (259 lb)   BMI " 41.80 kg/m²      Physical Exam    General  Exam: alert, awake, oriented, no distress, consistent with stated age        Upper Extremity:  Palpation: Radial pulse- Bilateral 2+    Normal LUE  strength  Left arm incisions c/d/I  Thrill not palpable, AVG patent by doppler  Left arm upper arm edema          Administrative Statements   I have spent a total time of 15 minutes in caring for this patient on the day of the visit/encounter including Counseling / Coordination of care, Documenting in the medical record, and Reviewing/placing orders in the medical record (including tests, medications, and/or procedures).

## 2025-06-18 ENCOUNTER — OFFICE VISIT (OUTPATIENT)
Dept: VASCULAR SURGERY | Facility: CLINIC | Age: 60
End: 2025-06-18

## 2025-06-18 VITALS
HEART RATE: 66 BPM | SYSTOLIC BLOOD PRESSURE: 144 MMHG | HEIGHT: 66 IN | WEIGHT: 259 LBS | BODY MASS INDEX: 41.62 KG/M2 | DIASTOLIC BLOOD PRESSURE: 86 MMHG

## 2025-06-18 DIAGNOSIS — N18.4 CKD (CHRONIC KIDNEY DISEASE) STAGE 4, GFR 15-29 ML/MIN (HCC): Primary | ICD-10-CM

## 2025-06-18 PROCEDURE — 99024 POSTOP FOLLOW-UP VISIT: CPT | Performed by: SURGERY

## 2025-06-18 NOTE — ASSESSMENT & PLAN NOTE
Lab Results   Component Value Date    EGFR 18 05/07/2025    EGFR 18 02/14/2025    EGFR 21 02/11/2025    CREATININE 3.42 (H) 05/07/2025    CREATININE 3.50 (H) 02/14/2025    CREATININE 3.04 (H) 02/11/2025       1 week status post left upper arm AV graft.  Patient is not currently on dialysis.  On exam he has intact incisions with edema in his upper arm.  He denies any left hand pain or numbness he has normal  strength of his left hand and a palpable distal left radial pulse.  He does not have an obvious of palpable thrill in his graft although it is patent by Doppler.  I will send a letter to his nephrologist that this can be used in 3 weeks if he needs to initiate dialysis at that time.  Otherwise he can follow-up with me on an as needed basis I did instruct him to avoid heavy lifting and strenuous exercise with his arm for another month.

## 2025-06-25 ENCOUNTER — APPOINTMENT (EMERGENCY)
Dept: RADIOLOGY | Facility: HOSPITAL | Age: 60
End: 2025-06-25
Payer: COMMERCIAL

## 2025-06-25 ENCOUNTER — HOSPITAL ENCOUNTER (EMERGENCY)
Facility: HOSPITAL | Age: 60
Discharge: HOME | End: 2025-06-26
Attending: EMERGENCY MEDICINE | Admitting: EMERGENCY MEDICINE
Payer: COMMERCIAL

## 2025-06-25 VITALS
WEIGHT: 263.67 LBS | TEMPERATURE: 97.8 F | RESPIRATION RATE: 18 BRPM | BODY MASS INDEX: 42.56 KG/M2 | HEART RATE: 68 BPM | DIASTOLIC BLOOD PRESSURE: 85 MMHG | OXYGEN SATURATION: 99 % | SYSTOLIC BLOOD PRESSURE: 177 MMHG

## 2025-06-25 DIAGNOSIS — Z51.89 VISIT FOR WOUND CHECK: Primary | ICD-10-CM

## 2025-06-25 LAB
ALBUMIN SERPL BCG-MCNC: 3.1 G/DL (ref 3.5–5)
ALP SERPL-CCNC: 66 U/L (ref 34–104)
ALT SERPL W P-5'-P-CCNC: 10 U/L (ref 7–52)
ANION GAP SERPL CALCULATED.3IONS-SCNC: 5 MMOL/L (ref 4–13)
AST SERPL W P-5'-P-CCNC: 19 U/L (ref 13–39)
BASOPHILS # BLD AUTO: 0.06 THOUSANDS/ÂΜL (ref 0–0.1)
BASOPHILS NFR BLD AUTO: 1 % (ref 0–1)
BILIRUB SERPL-MCNC: 0.25 MG/DL (ref 0.2–1)
BUN SERPL-MCNC: 33 MG/DL (ref 5–25)
CALCIUM ALBUM COR SERPL-MCNC: 9.1 MG/DL (ref 8.3–10.1)
CALCIUM SERPL-MCNC: 8.4 MG/DL (ref 8.4–10.2)
CHLORIDE SERPL-SCNC: 106 MMOL/L (ref 96–108)
CO2 SERPL-SCNC: 25 MMOL/L (ref 21–32)
CREAT SERPL-MCNC: 4.38 MG/DL (ref 0.6–1.3)
EOSINOPHIL # BLD AUTO: 0.19 THOUSAND/ÂΜL (ref 0–0.61)
EOSINOPHIL NFR BLD AUTO: 3 % (ref 0–6)
ERYTHROCYTE [DISTWIDTH] IN BLOOD BY AUTOMATED COUNT: 14.1 % (ref 11.6–15.1)
GFR SERPL CREATININE-BSD FRML MDRD: 13 ML/MIN/1.73SQ M
GLUCOSE SERPL-MCNC: 134 MG/DL (ref 65–140)
HCT VFR BLD AUTO: 29.7 % (ref 36.5–49.3)
HGB BLD-MCNC: 9.8 G/DL (ref 12–17)
IMM GRANULOCYTES # BLD AUTO: 0.02 THOUSAND/UL (ref 0–0.2)
IMM GRANULOCYTES NFR BLD AUTO: 0 % (ref 0–2)
LYMPHOCYTES # BLD AUTO: 2.17 THOUSANDS/ÂΜL (ref 0.6–4.47)
LYMPHOCYTES NFR BLD AUTO: 37 % (ref 14–44)
MCH RBC QN AUTO: 25.9 PG (ref 26.8–34.3)
MCHC RBC AUTO-ENTMCNC: 33 G/DL (ref 31.4–37.4)
MCV RBC AUTO: 79 FL (ref 82–98)
MONOCYTES # BLD AUTO: 0.57 THOUSAND/ÂΜL (ref 0.17–1.22)
MONOCYTES NFR BLD AUTO: 10 % (ref 4–12)
NEUTROPHILS # BLD AUTO: 2.88 THOUSANDS/ÂΜL (ref 1.85–7.62)
NEUTS SEG NFR BLD AUTO: 49 % (ref 43–75)
NRBC BLD AUTO-RTO: 0 /100 WBCS
PLATELET # BLD AUTO: 193 THOUSANDS/UL (ref 149–390)
PMV BLD AUTO: 10 FL (ref 8.9–12.7)
POTASSIUM SERPL-SCNC: 4.3 MMOL/L (ref 3.5–5.3)
PROT SERPL-MCNC: 6 G/DL (ref 6.4–8.4)
RBC # BLD AUTO: 3.78 MILLION/UL (ref 3.88–5.62)
SODIUM SERPL-SCNC: 136 MMOL/L (ref 135–147)
WBC # BLD AUTO: 5.89 THOUSAND/UL (ref 4.31–10.16)

## 2025-06-25 PROCEDURE — 36415 COLL VENOUS BLD VENIPUNCTURE: CPT

## 2025-06-25 PROCEDURE — 73060 X-RAY EXAM OF HUMERUS: CPT

## 2025-06-25 PROCEDURE — 99283 EMERGENCY DEPT VISIT LOW MDM: CPT

## 2025-06-25 PROCEDURE — 99284 EMERGENCY DEPT VISIT MOD MDM: CPT

## 2025-06-25 PROCEDURE — 80053 COMPREHEN METABOLIC PANEL: CPT

## 2025-06-25 PROCEDURE — 85025 COMPLETE CBC W/AUTO DIFF WBC: CPT

## 2025-06-26 RX ORDER — CEPHALEXIN 500 MG/1
500 CAPSULE ORAL EVERY 8 HOURS SCHEDULED
Qty: 15 CAPSULE | Refills: 0 | Status: SHIPPED | OUTPATIENT
Start: 2025-06-26 | End: 2025-07-01

## 2025-06-26 RX ADMIN — CEPHALEXIN 500 MG: 250 CAPSULE ORAL at 00:06

## 2025-06-26 NOTE — DISCHARGE INSTRUCTIONS
Today provide prescription for Keflex.  Take as directed in the coming days.  Please check in with your vascular team for a follow-up appointment to ensure resolution of symptoms.  This follow-up should occur within the next week ideally.  Monitor appearance of wound in the coming days.  Please do return to ED for new or worsening symptoms.

## 2025-06-26 NOTE — ED PROVIDER NOTES
Time reflects when diagnosis was documented in both MDM as applicable and the Disposition within this note       Time User Action Codes Description Comment    6/26/2025 12:03 AM Mario Cuellar Add [Z51.89] Visit for wound check           ED Disposition       ED Disposition   Discharge    Condition   Stable    Date/Time   Thu Jun 26, 2025 12:03 AM    Comment   Tommie GOODEN Claudia discharge to home/self care.                   Assessment & Plan       Medical Decision Making  60-year-old male presents to ED for wound check as seen in HPI. On physical examination patient vitals signs stable. Afebrile. Nontachycardic. Nontoxic appearing. No murmur. Normal breath sounds. See attached image of left AV fistula site in physical exam portion of note. Mild swelling at fistula site. Questionable drainage from wound. No signs of dehiscence. Palpable thrill.  Neurovascularly intact left upper extremity. Obtaining workup consisting of CBC, CMP, left humerus x-ray.   Ddx includes but not limited to normal post operative changes, cellulitis, surgical site infection, abscess     CBC without leukocytosis.  CMP without significant change from previous values. Humerus xray without acute osseous abnormality, subcutaneous gas.     Plan to discharge with prescription for keflex. First dose in ED. Advised to contact his vascular surgery team in the coming days to discuss symptoms and follow up for re-eval.  Strict return precautions discussed.  Patient agreeable to plan.  Patient discharged.     Prior to discharge, discharge instructions were discussed with patient at bedside. Patient was provided both verbal and written instructions. Patient is understanding of the discharge instructions and is agreeable to plan of care. Return precautions were discussed with patient bedside, patient verbalized understanding of signs and symptoms that would necessitate return to the ED. All questions were answered. Patient was comfortable with the plan of  "care and discharged to home.    Portions of this chart may have been written with voice recognition software.  Occasional grammatical errors, wrong word or \"sound a like\" substitutions may have occurred due to software limitations.  Please read carefully and use context to recognize where substitutions have occurred.     Amount and/or Complexity of Data Reviewed  Labs: ordered.  Radiology: ordered.    Risk  Prescription drug management.             Medications   cephalexin (KEFLEX) capsule 500 mg (500 mg Oral Given 6/26/25 0006)       ED Risk Strat Scores                    No data recorded                            History of Present Illness       Chief Complaint   Patient presents with    Wound Check     Pt was suppose to have a fistula placed on the right arm and had a graft done instead. Today work up and the area is swollen and warm to the touch. No chest pain, SOB, fever or body aches.        Past Medical History[1]   Past Surgical History[2]   Family History[3]   Social History[4]   E-Cigarette/Vaping    E-Cigarette Use Never User       E-Cigarette/Vaping Substances    Nicotine No     THC No     CBD No     Flavoring No     Other No     Unknown No       I have reviewed and agree with the history as documented.     59 yo male with history of type 2 diabetes, CKD, GERD, HTN presents to ED for wound check. Patient states that he had a left arm AV graft performed on June 12, 2025. This morning he noticed swelling, warmth, drainage from the AV graft site. Patient concerned for infection.  Denies fever, weakness, chills, chest pain, shortness of breath.  No other concerns today.                 Review of Systems   Skin:  Positive for wound.   All other systems reviewed and are negative.          Objective       ED Triage Vitals [06/25/25 2135]   Temperature Pulse Blood Pressure Respirations SpO2 Patient Position - Orthostatic VS   97.8 °F (36.6 °C) 68 (!) 177/85 18 99 % Lying      Temp Source Heart Rate Source BP " Location FiO2 (%) Pain Score    Oral Monitor Right arm -- --      Vitals      Date and Time Temp Pulse SpO2 Resp BP Pain Score FACES Pain Rating User   06/25/25 2135 97.8 °F (36.6 °C) 68 99 % 18 177/85 -- -- JR            Physical Exam  Vitals and nursing note reviewed.   Constitutional:       General: He is not in acute distress.     Appearance: Normal appearance. He is well-developed. He is not ill-appearing, toxic-appearing or diaphoretic.   HENT:      Head: Normocephalic and atraumatic.     Eyes:      Conjunctiva/sclera: Conjunctivae normal.       Cardiovascular:      Rate and Rhythm: Normal rate and regular rhythm.      Pulses: Normal pulses.      Heart sounds: Normal heart sounds. No murmur heard.     No friction rub. No gallop.   Pulmonary:      Effort: Pulmonary effort is normal. No respiratory distress.      Breath sounds: Normal breath sounds. No stridor. No wheezing, rhonchi or rales.   Abdominal:      Palpations: Abdomen is soft.      Tenderness: There is no abdominal tenderness.     Musculoskeletal:         General: No swelling.      Left hand: Normal capillary refill. Normal pulse.        Arms:       Cervical back: Neck supple.      Comments: Left AV fistula site examined.  See attached image of the fistula site.      Skin:     General: Skin is warm and dry.      Capillary Refill: Capillary refill takes less than 2 seconds.     Neurological:      Mental Status: He is alert.     Psychiatric:         Mood and Affect: Mood normal.                     Results Reviewed       Procedure Component Value Units Date/Time    Comprehensive metabolic panel [[id number]]  (Abnormal) Collected: 06/25/25 2229    Lab Status: Final result Specimen: Blood from Arm, Right Updated: 06/25/25 8854     Sodium 136 mmol/L      Potassium 4.3 mmol/L      Chloride 106 mmol/L      CO2 25 mmol/L      ANION GAP 5 mmol/L      BUN 33 mg/dL      Creatinine 4.38 mg/dL      Glucose 134 mg/dL      Calcium 8.4 mg/dL      Corrected Calcium 9.1  mg/dL      AST 19 U/L      ALT 10 U/L      Alkaline Phosphatase 66 U/L      Total Protein 6.0 g/dL      Albumin 3.1 g/dL      Total Bilirubin 0.25 mg/dL      eGFR 13 ml/min/1.73sq m     Narrative:      [employer] Kid[employer] guidelines for Chronic Kidney Disease (CKD):     Stage 1 with normal or high GFR (GFR > 90 mL/min/1.73 square meters)    Stage 2 Mild CKD (GFR = 60-89 mL/min/1.73 square meters)    Stage 3A Moderate CKD (GFR = 45-59 mL/min/1.73 square meters)    Stage 3B Moderate CKD (GFR = 30-44 mL/min/1.73 square meters)    Stage 4 Severe CKD (GFR = 15-29 mL/min/1.73 square meters)    Stage 5 End Stage CKD (GFR <15 mL/min/1.73 square meters)  Note: GFR calculation is accurate only with a steady state creatinine    CBC and differential [[id number]]  (Abnormal) Collected: 06/25/25 2229    Lab Status: Final result Specimen: Blood from Arm, Right Updated: 06/25/25 2258     WBC 5.89 Thousand/uL      RBC 3.78 Million/uL      Hemoglobin 9.8 g/dL      Hematocrit 29.7 %      MCV 79 fL      MCH 25.9 pg      MCHC 33.0 g/dL      RDW 14.1 %      MPV 10.0 fL      Platelets 193 Thousands/uL      nRBC 0 /100 WBCs      Segmented % 49 %      Immature Grans % 0 %      Lymphocytes % 37 %      Monocytes % 10 %      Eosinophils Relative 3 %      Basophils Relative 1 %      Absolute Neutrophils 2.88 Thousands/µL      Absolute Immature Grans 0.02 Thousand/uL      Absolute Lymphocytes 2.17 Thousands/µL      Absolute Monocytes 0.57 Thousand/µL      Eosinophils Absolute 0.19 Thousand/µL      Basophils Absolute 0.06 Thousands/µL             XR humerus LEFT   Final Interpretation by Anatoliy Gonzalez MD (06/26 9381)      No acute osseous abnormality.         Computerized Assisted Algorithm (CAA) may have been used to analyze all applicable images.         Workstation performed: [id number]             Procedures    ED Medication and Procedure Management   Prior to Admission Medications   Prescriptions Last Dose Informant  Patient Reported? Taking?   Cholecalciferol (VITAMIN D3) 1,000 units tablet  Self No No   Sig: Take 2 tablets (2,000 Units total) by mouth daily   Lancets (freestyle) lancets  Self No No   Sig: Use as instructed   NIFEdipine (ADALAT CC) 90 mg 24 hr tablet  Self No No   Sig: Take 1 tablet (90 mg total) by mouth daily   acetaminophen (TYLENOL) 325 mg tablet  Self No No   Sig: Take 2 tablets (650 mg total) by mouth every 6 (six) hours as needed for mild pain, fever or headaches   ammonium lactate (LAC-HYDRIN) 12 % cream  Self No No   Sig: Apply topically as needed for dry skin   aspirin 81 mg EC tablet  Self No No   Sig: Take 1 tablet (81 mg total) by mouth daily   atenolol (TENORMIN) 25 mg tablet  Self No No   Sig: Take 1 tablet (25 mg total) by mouth daily   atorvastatin (LIPITOR) 20 mg tablet  Self No No   Sig: Take 1 tablet (20 mg total) by mouth daily   calcium carbonate (TUMS) 500 mg chewable tablet  Self No No   Sig: Chew 2 tablets (1,000 mg total) 2 (two) times a day as needed for indigestion or heartburn   ergocalciferol (ERGOCALCIFEROL) 1.25 MG (36348 UT) capsule  Self No No   Sig: Take 1 capsule (50,000 Units total) by mouth once a week   glucose blood (FREESTYLE LITE) test strip  Self No No   Sig: CHECK BLOOD SUGARS FOUR TIMES DAILY   magnesium gluconate (MAGONATE) 500 MG tablet  Self No No   Sig: Take 1 tablet (500 mg total) by mouth every other day   omeprazole (PriLOSEC) 20 mg delayed release capsule  Self No No   Sig: Take 1 capsule (20 mg total) by mouth daily      Facility-Administered Medications: None     Discharge Medication List as of 6/26/2025 12:08 AM        START taking these medications    Details   cephalexin (KEFLEX) 500 mg capsule Take 1 capsule (500 mg total) by mouth every 8 (eight) hours for 5 days, Starting u 6/26/2025, Until Tue 7/1/2025, Normal           CONTINUE these medications which have NOT CHANGED    Details   acetaminophen (TYLENOL) 325 mg tablet Take 2 tablets (650 mg total)  by mouth every 6 (six) hours as needed for mild pain, fever or headaches, Starting Sat 10/26/2024, No Print      ammonium lactate (LAC-HYDRIN) 12 % cream Apply topically as needed for dry skin, Starting Wed 9/25/2024, Normal      aspirin 81 mg EC tablet Take 1 tablet (81 mg total) by mouth daily, Starting Wed 2/5/2025, Until Wed 6/18/2025, Normal      atenolol (TENORMIN) 25 mg tablet Take 1 tablet (25 mg total) by mouth daily, Starting Tue 5/20/2025, Normal      atorvastatin (LIPITOR) 20 mg tablet Take 1 tablet (20 mg total) by mouth daily, Starting Tue 5/20/2025, Normal      calcium carbonate (TUMS) 500 mg chewable tablet Chew 2 tablets (1,000 mg total) 2 (two) times a day as needed for indigestion or heartburn, Starting Sat 10/26/2024, No Print      Cholecalciferol (VITAMIN D3) 1,000 units tablet Take 2 tablets (2,000 Units total) by mouth daily, Starting Wed 2/5/2025, Until Wed 6/18/2025, Normal      ergocalciferol (ERGOCALCIFEROL) 1.25 MG (22910 UT) capsule Take 1 capsule (50,000 Units total) by mouth once a week, Starting Tue 3/18/2025, Normal      glucose blood (FREESTYLE LITE) test strip CHECK BLOOD SUGARS FOUR TIMES DAILY, Normal      Lancets (freestyle) lancets Use as instructed, Normal      magnesium gluconate (MAGONATE) 500 MG tablet Take 1 tablet (500 mg total) by mouth every other day, Starting Tue 3/18/2025, Normal      NIFEdipine (ADALAT CC) 90 mg 24 hr tablet Take 1 tablet (90 mg total) by mouth daily, Starting Tue 5/20/2025, Normal      omeprazole (PriLOSEC) 20 mg delayed release capsule Take 1 capsule (20 mg total) by mouth daily, Starting Tue 5/20/2025, Until Mon 8/18/2025, Normal           No discharge procedures on file.  ED SEPSIS DOCUMENTATION   Time reflects when diagnosis was documented in both MDM as applicable and the Disposition within this note       Time User Action Codes Description Comment    6/26/2025 12:03 AM Mario Cuellar Add [Z51.89] Visit for wound check                      [1]    Past Medical History:  Diagnosis Date    ADHD, adult residual type     Anxiety     Anxiety     Bipolar 1 disorder (HCC)     Cataract     Left eye    Chronic kidney disease     Colon polyp     CPAP (continuous positive airway pressure) dependence     Depression     Depression     Diabetes mellitus (HCC)     blood sugar 167 on admission    Equinus deformity of foot     GERD (gastroesophageal reflux disease)     Homicidal ideations     Hyperlipidemia     Hypertension     Microalbuminuria     Morbid obesity (HCC)     Neuropathy     Shortness of breath     Sleep apnea     CPAP at bedtime    Sleep apnea     Stroke (HCC)     Suicidal intent    [2]   Past Surgical History:  Procedure Laterality Date    CATARACT EXTRACTION      CATARACT EXTRACTION      COLONOSCOPY      HAND SURGERY Right     OTHER SURGICAL HISTORY      REPAIR OF SUPERFICIAL WOUND FACE    IL ARTERIOVENOUS ANASTOMOSIS OPEN DIRECT Left 6/12/2025    Procedure: left arm  AV graft;  Surgeon: Kaveh Saavedra DO;  Location: [facility];  Service: Vascular    IL ESOPHAGOGASTRODUODENOSCOPY TRANSORAL DIAGNOSTIC N/A 06/14/2018    Procedure: ESOPHAGOGASTRODUODENOSCOPY (EGD);  Surgeon: Yinka Maier MD;  Location: [facility];  Service: Gastroenterology    IL ESOPHAGOGASTRODUODENOSCOPY TRANSORAL DIAGNOSTIC N/A 09/12/2018    Procedure: EGD AND COLONOSCOPY;  Surgeon: Yinka Maier MD;  Location: [facility];  Service: Gastroenterology   [3]   Family History  Problem Relation Name Age of Onset    Diabetes Mother      Alcohol abuse Father      Diabetes Father      Hypertension Father      Lung cancer Father      Stroke Father      Cancer Father          lung    Alcohol abuse Sister      Depression Sister      Lymphoma Sister      Alcohol abuse Family      Alcohol abuse Sister      Alcohol abuse Sister      Cancer Sister      Heart disease Neg Hx      Thyroid disease Neg Hx     [4]   Social History  Tobacco Use    Smoking status: Former     Current packs/day: 0.00     Types:  "Cigarettes     Quit date:      Years since quittin.5    Smokeless tobacco: Current     Types: Chew    Tobacco comments:     pt \"Quit after approx over 25 years ago\"   Vaping Use    Vaping status: Never Used   Substance Use Topics    Alcohol use: Not Currently     Comment: rarely    Drug use: Not Currently     Comment: quit 20 years ago        Mario Cuellar PA-C  [id number]    "

## 2025-07-05 ENCOUNTER — TELEPHONE (OUTPATIENT)
Dept: INTERNAL MEDICINE CLINIC | Facility: CLINIC | Age: 60
End: 2025-07-05

## 2025-07-05 NOTE — TELEPHONE ENCOUNTER
This patient is due for follow- up appt- please schedule him on any Wednesday in August other than August 13 so I can see him with one of the residents- thank you Dr. Donaldson

## 2025-07-07 NOTE — TELEPHONE ENCOUNTER
Please call patient and scheduled follow up appointment any Wednesday in August other than August 13th per Dr. Donaldson.

## 2025-07-28 ENCOUNTER — TELEPHONE (OUTPATIENT)
Dept: NEPHROLOGY | Facility: CLINIC | Age: 60
End: 2025-07-28

## 2025-08-05 ENCOUNTER — OFFICE VISIT (OUTPATIENT)
Dept: INTERNAL MEDICINE CLINIC | Facility: CLINIC | Age: 60
End: 2025-08-05

## 2025-08-05 VITALS
TEMPERATURE: 98.2 F | SYSTOLIC BLOOD PRESSURE: 136 MMHG | BODY MASS INDEX: 42.91 KG/M2 | HEART RATE: 62 BPM | OXYGEN SATURATION: 98 % | WEIGHT: 267 LBS | DIASTOLIC BLOOD PRESSURE: 70 MMHG | HEIGHT: 66 IN

## 2025-08-05 DIAGNOSIS — I12.9 TYPE 2 DIABETES MELLITUS WITH STAGE 4 CHRONIC KIDNEY DISEASE AND HYPERTENSION (HCC): ICD-10-CM

## 2025-08-05 DIAGNOSIS — E55.9 VITAMIN D DEFICIENCY: Primary | ICD-10-CM

## 2025-08-05 DIAGNOSIS — E11.22 TYPE 2 DIABETES MELLITUS WITH STAGE 4 CHRONIC KIDNEY DISEASE AND HYPERTENSION (HCC): ICD-10-CM

## 2025-08-05 DIAGNOSIS — I10 PRIMARY HYPERTENSION: ICD-10-CM

## 2025-08-05 DIAGNOSIS — I63.512 CEREBROVASCULAR ACCIDENT (CVA) DUE TO OCCLUSION OF LEFT MIDDLE CEREBRAL ARTERY (HCC): ICD-10-CM

## 2025-08-05 DIAGNOSIS — M89.9 CHRONIC KIDNEY DISEASE-MINERAL AND BONE DISORDER (CKD-MBD): ICD-10-CM

## 2025-08-05 DIAGNOSIS — N18.9 CHRONIC KIDNEY DISEASE-MINERAL AND BONE DISORDER (CKD-MBD): ICD-10-CM

## 2025-08-05 DIAGNOSIS — N18.4 TYPE 2 DIABETES MELLITUS WITH STAGE 4 CHRONIC KIDNEY DISEASE AND HYPERTENSION (HCC): ICD-10-CM

## 2025-08-05 DIAGNOSIS — E83.9 CHRONIC KIDNEY DISEASE-MINERAL AND BONE DISORDER (CKD-MBD): ICD-10-CM

## 2025-08-05 PROCEDURE — 99213 OFFICE O/P EST LOW 20 MIN: CPT | Performed by: STUDENT IN AN ORGANIZED HEALTH CARE EDUCATION/TRAINING PROGRAM

## 2025-08-12 ENCOUNTER — TELEPHONE (OUTPATIENT)
Dept: GASTROENTEROLOGY | Facility: AMBULARY SURGERY CENTER | Age: 60
End: 2025-08-12

## 2025-08-12 ENCOUNTER — TELEPHONE (OUTPATIENT)
Dept: PSYCHIATRY | Facility: CLINIC | Age: 60
End: 2025-08-12

## 2025-08-21 ENCOUNTER — EVALUATION (OUTPATIENT)
Dept: OCCUPATIONAL THERAPY | Facility: CLINIC | Age: 60
End: 2025-08-21
Payer: COMMERCIAL

## 2025-08-21 DIAGNOSIS — Z86.73 HISTORY OF CVA (CEREBROVASCULAR ACCIDENT): Primary | ICD-10-CM

## 2025-08-21 PROCEDURE — 97112 NEUROMUSCULAR REEDUCATION: CPT

## 2025-08-21 PROCEDURE — 97110 THERAPEUTIC EXERCISES: CPT

## 2025-08-27 PROBLEM — E87.6 HYPOKALEMIA: Status: RESOLVED | Noted: 2025-02-10 | Resolved: 2025-08-27

## 2025-08-27 PROBLEM — N18.4 BENIGN HYPERTENSION WITH CKD (CHRONIC KIDNEY DISEASE) STAGE IV (HCC): Status: RESOLVED | Noted: 2025-03-18 | Resolved: 2025-08-27

## 2025-08-27 PROBLEM — R79.89 ELEVATED SERUM CREATININE: Status: ACTIVE | Noted: 2025-08-27

## 2025-08-27 PROBLEM — I12.9 BENIGN HYPERTENSION WITH CKD (CHRONIC KIDNEY DISEASE) STAGE IV (HCC): Status: RESOLVED | Noted: 2025-03-18 | Resolved: 2025-08-27

## 2025-08-27 PROBLEM — N18.4 TYPE 2 DIABETES MELLITUS WITH STAGE 4 CHRONIC KIDNEY DISEASE (HCC): Status: RESOLVED | Noted: 2025-03-18 | Resolved: 2025-08-27

## 2025-08-27 PROBLEM — E11.22 TYPE 2 DIABETES MELLITUS WITH STAGE 4 CHRONIC KIDNEY DISEASE (HCC): Status: RESOLVED | Noted: 2025-03-18 | Resolved: 2025-08-27

## (undated) DEVICE — DISPOSABLE OR TOWEL: Brand: CARDINAL HEALTH

## (undated) DEVICE — SUT SILK 2-0 18 IN A185H

## (undated) DEVICE — HEMOCLIP CARTRIDGE SM

## (undated) DEVICE — HEMOCLIP CARTRIDGE MED

## (undated) DEVICE — DECANTER: Brand: UNBRANDED

## (undated) DEVICE — ESOPHAGEAL/COLONIC/BILIARY WIREGUIDED BALLOON DILATATION CATHETER: Brand: CRE™ PRO

## (undated) DEVICE — GLOVE SRG BIOGEL ECLIPSE 7.5

## (undated) DEVICE — INSULATED BLADE ELECTRODE: Brand: EDGE

## (undated) DEVICE — SUT PROLENE 6-0 BV-1/BV-1 24 IN 8305H

## (undated) DEVICE — 1/2 FORCE SURGICAL SPRING CLIP: Brand: STEALTH® SPRING CLIP

## (undated) DEVICE — STRL BETHLEHEM A V FISTULA PK: Brand: CARDINAL HEALTH

## (undated) DEVICE — HEMOSTATIC MATRIX SURGIFLO 8ML W/THROMBIN

## (undated) DEVICE — ULTRASOUND GEL STERILE FOIL PK

## (undated) DEVICE — SUT SILK 3-0 18 IN A184H

## (undated) DEVICE — SUT MONOCRYL 4-0 PS-2 27 IN Y426H

## (undated) DEVICE — DRESSING MEPILEX AG BORDER POST-OP 4 X 10 IN

## (undated) DEVICE — ANTIBACTERIAL UNDYED BRAIDED (POLYGLACTIN 910), SYNTHETIC ABSORBABLE SUTURE: Brand: COATED VICRYL